# Patient Record
Sex: FEMALE | Race: WHITE | NOT HISPANIC OR LATINO | Employment: OTHER | URBAN - METROPOLITAN AREA
[De-identification: names, ages, dates, MRNs, and addresses within clinical notes are randomized per-mention and may not be internally consistent; named-entity substitution may affect disease eponyms.]

---

## 2017-01-11 DIAGNOSIS — I73.9 PERIPHERAL ARTERY DISEASE (H): Primary | ICD-10-CM

## 2017-01-11 DIAGNOSIS — I10 HTN (HYPERTENSION): Primary | ICD-10-CM

## 2017-01-12 NOTE — TELEPHONE ENCOUNTER
PN,  Routing refill request to provider for review/approval because:  Drug not on the [facility] refill protocol -Pletal not on nursing protocol.  Plavix last ordered 8/2/16 with 0 refills also last creat was 1.40 on 9/6/16 and no ALT/AST (need per nursing protocol) has been done in the last couple of years.  Please advise/authorize if appropriate.  Thanks,  Toshia Beckwith RN

## 2017-01-12 NOTE — TELEPHONE ENCOUNTER
Plavix            Last Written Prescription Date: 08/02/2016  Last Fill Quantity: 30, # refills: 0    Last Office Visit with [facility], [facility] or [facility] prescribing provider:  08/10/2016     Future Office Visit:       WBC      5.1   9/6/2016  RBC     4.49   9/6/2016  HGB     13.0   9/6/2016  HCT     39.6   9/6/2016  No components found with this name: mct  MCV       88   9/6/2016  MCH     29.0   9/6/2016  MCHC     32.8   9/6/2016  RDW     13.8   9/6/2016  PLT      249   9/6/2016  PLT      179   7/25/2005  AST       24   7/22/2014  ALT       21   7/22/2014  CREATININE   Date Value Ref Range Status   09/06/2016 1.40* 0.52 - 1.04 mg/dL Final   ]  Pletal      Last Written Prescription Date:  08/02/2016  Last Fill Quantity: 60,   # refills: 0  Last Office Visit with [facility], [facility] or [facility] prescribing provider: 08/10/2016  Future Office visit:       Routing refill request to provider for review/approval because:  Drug not on the [facility], [facility] or [facility] refill protocol or controlled substance

## 2017-01-13 RX ORDER — CILOSTAZOL 100 MG/1
TABLET ORAL
Qty: 180 TABLET | Refills: 0 | Status: SHIPPED | OUTPATIENT
Start: 2017-01-13 | End: 2017-06-23

## 2017-01-13 RX ORDER — CLOPIDOGREL BISULFATE 75 MG/1
TABLET ORAL
Qty: 90 TABLET | Refills: 0 | Status: SHIPPED | OUTPATIENT
Start: 2017-01-13 | End: 2017-06-23

## 2017-01-18 ENCOUNTER — OFFICE VISIT (OUTPATIENT)
Dept: DERMATOLOGY | Facility: CLINIC | Age: 67
End: 2017-01-18

## 2017-01-18 DIAGNOSIS — Z12.83 SKIN CANCER SCREENING: ICD-10-CM

## 2017-01-18 DIAGNOSIS — L82.1 SEBORRHEIC KERATOSIS: ICD-10-CM

## 2017-01-18 DIAGNOSIS — C44.90 NON-MELANOMA SKIN CANCER: ICD-10-CM

## 2017-01-18 DIAGNOSIS — Z85.828 HISTORY OF BASAL CELL CARCINOMA: ICD-10-CM

## 2017-01-18 DIAGNOSIS — D18.01 CHERRY ANGIOMA: ICD-10-CM

## 2017-01-18 DIAGNOSIS — L85.3 XEROSIS OF SKIN: Primary | ICD-10-CM

## 2017-01-18 RX ORDER — METOPROLOL TARTRATE 50 MG
TABLET ORAL
Qty: 60 TABLET | Refills: 0 | Status: SHIPPED
Start: 2017-01-18 | End: 2017-06-23

## 2017-01-18 RX ORDER — UREA 200 MG/G
CREAM TOPICAL DAILY
Qty: 200 G | Refills: 3 | Status: SHIPPED | OUTPATIENT
Start: 2017-01-18 | End: 2018-03-06

## 2017-01-18 ASSESSMENT — ENCOUNTER SYMPTOMS
POSTURAL DYSPNEA: 0
SPUTUM PRODUCTION: 0
MUSCLE CRAMPS: 0
COUGH: 1
NECK PAIN: 0
TACHYCARDIA: 0
EXERCISE INTOLERANCE: 0
LIGHT-HEADEDNESS: 0
HYPERTENSION: 1
DECREASED LIBIDO: 1
BOWEL INCONTINENCE: 1
JOINT SWELLING: 0
RECTAL BLEEDING: 0
ARTHRALGIAS: 0
MUSCLE WEAKNESS: 0
ORTHOPNEA: 0
COUGH DISTURBING SLEEP: 0
STIFFNESS: 0
DYSPNEA ON EXERTION: 1
PALPITATIONS: 0
BACK PAIN: 1
BLOATING: 1
DIARRHEA: 1
HOT FLASHES: 0
LEG PAIN: 0
WHEEZING: 0
SYNCOPE: 0
RESPIRATORY PAIN: 0
SHORTNESS OF BREATH: 0
HYPOTENSION: 0
SLEEP DISTURBANCES DUE TO BREATHING: 0
SNORES LOUDLY: 0
LEG SWELLING: 1
HEMOPTYSIS: 0
JAUNDICE: 0
MYALGIAS: 0
CLAUDICATION: 0

## 2017-01-18 ASSESSMENT — PAIN SCALES - GENERAL: PAINLEVEL: NO PAIN (0)

## 2017-01-18 NOTE — NURSING NOTE
"Dermatology Rooming Note    Maribel Yang's goals for this visit include:   Chief Complaint   Patient presents with     Skin Check     Maribel states \" I am here to followup on my last skin cancer.\"     Carmen Luz LPN  "
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
## 2017-01-23 ENCOUNTER — MYC MEDICAL ADVICE (OUTPATIENT)
Dept: FAMILY MEDICINE | Facility: CLINIC | Age: 67
End: 2017-01-23

## 2017-01-24 ENCOUNTER — MYC MEDICAL ADVICE (OUTPATIENT)
Dept: FAMILY MEDICINE | Facility: CLINIC | Age: 67
End: 2017-01-24

## 2017-01-25 ENCOUNTER — RADIANT APPOINTMENT (OUTPATIENT)
Dept: GENERAL RADIOLOGY | Facility: CLINIC | Age: 67
End: 2017-01-25
Attending: FAMILY MEDICINE
Payer: MEDICAID

## 2017-01-25 ENCOUNTER — OFFICE VISIT (OUTPATIENT)
Dept: FAMILY MEDICINE | Facility: CLINIC | Age: 67
End: 2017-01-25
Payer: MEDICAID

## 2017-01-25 VITALS
SYSTOLIC BLOOD PRESSURE: 148 MMHG | DIASTOLIC BLOOD PRESSURE: 82 MMHG | BODY MASS INDEX: 19.35 KG/M2 | HEIGHT: 63 IN | TEMPERATURE: 98.1 F | OXYGEN SATURATION: 100 % | WEIGHT: 109.2 LBS | HEART RATE: 79 BPM

## 2017-01-25 DIAGNOSIS — M54.6 RIGHT-SIDED THORACIC BACK PAIN, UNSPECIFIED CHRONICITY: ICD-10-CM

## 2017-01-25 DIAGNOSIS — R07.81 RIB PAIN ON RIGHT SIDE: Primary | ICD-10-CM

## 2017-01-25 DIAGNOSIS — R07.81 RIB PAIN ON RIGHT SIDE: ICD-10-CM

## 2017-01-25 DIAGNOSIS — M67.40 GANGLION CYST: ICD-10-CM

## 2017-01-25 PROCEDURE — 71101 X-RAY EXAM UNILAT RIBS/CHEST: CPT | Mod: RT

## 2017-01-25 PROCEDURE — 99214 OFFICE O/P EST MOD 30 MIN: CPT | Performed by: FAMILY MEDICINE

## 2017-01-25 NOTE — NURSING NOTE
"Chief Complaint   Patient presents with     Derm Problem     lump on R wrist     Back Pain     middle of the back/rib cage pain, did have xray 08/29/2016, pt states still having pain     /82 mmHg  Pulse 79  Temp(Src) 98.1  F (36.7  C) (Oral)  Ht 5' 3\" (1.6 m)  Wt 109 lb 3.2 oz (49.533 kg)  BMI 19.35 kg/m2  SpO2 100% Estimated body mass index is 19.35 kg/(m^2) as calculated from the following:    Height as of this encounter: 5' 3\" (1.6 m).    Weight as of this encounter: 109 lb 3.2 oz (49.533 kg).  BP completed using cuff size: regular       Health Maintenance due pending provider review:  Pap Smear    Aware coming due, completed with Dr. Dexter Mays, [facility]    "

## 2017-01-25 NOTE — PROGRESS NOTES
"  SUBJECTIVE:                                                    Maribel Yang is a 66 year old female who presents to clinic today for the following health issues:      Chief Complaint   Patient presents with     Derm Problem     lump on R wrist     Back Pain     middle of the back/rib cage pain, did have xray 08/29/2016, pt states still having pain     Right wrist lump noticed this past weekend on Saturday - sudden onset.  No pain or injury.  No rash or other.  No limit to range of motion.  No prior history.    Back pain.  Pt states she developed severe pain on the right lower rib area back in August after a day of scrubbing her floors for many hours.  She states the pain was so severe at that time she had to go to the ER.   All ER notes and imaging was reviewed -   The CXR showed questionable rib fracture on the right   The CT of the chest showed possible increase in size of the right upper lobe lesion being followed by oncology.  She states the pain is persisting since that time   She has not worsened.  Hurts to touch the area on her mid thoracic right back.  No skin rashes or lesions.  Does hurt if she leans back on chair backs.  Since her visit she did see oncology and they did PET scan in October that was negative for any bone metastatic lesions.      -------------------------------------    Problem list and histories reviewed & adjusted, as indicated.  Additional history: as documented    Problem list, Medication list, Allergies, and Medical/Social/Surgical histories reviewed in EPIC and updated as appropriate.    ROS:  Constitutional, HEENT, cardiovascular, pulmonary, gi and gu systems are negative, except as otherwise noted.    OBJECTIVE:                                                    /82 mmHg  Pulse 79  Temp(Src) 98.1  F (36.7  C) (Oral)  Ht 5' 3\" (1.6 m)  Wt 109 lb 3.2 oz (49.533 kg)  BMI 19.35 kg/m2  SpO2 100%  Body mass index is 19.35 kg/(m^2).  GENERAL: alert and no distress  RESP: " lungs clear to auscultation - no rales, rhonchi or wheezes  CV: regular rate and rhythm, normal S1 S2, no S3 or S4, no murmur, click or rub, no peripheral edema and peripheral pulses strong  MS: right thoracic back tenderness to palpation of the T8-10 rib area  Wrist - right - small 8-9mm firm cyst appreciated on the lateral flexer wrist.  SKIN: no suspicious lesions or rashes    Diagnostic Test Results:  Xray ribs - no lytic lesion or fractures appreciated     ASSESSMENT/PLAN:                                                      1. Rib pain on right side  Right posterior rib pain -   This is reproducible so I suspect musculoskeletal.  Symptoms present since end of August.  Discussed the possible etiology.  Her PET scan was negative in October for bone metastatic lesions.  Repeat xray shows no new abnormality.    Will have her do physical therapy to see if helpful in relieving her pain  Can try OTC lidocaine patch 4%  - XR Ribs & Chest Right G/E 3 Views; Future  - [facility]    2. Right-sided thoracic back pain, unspecified chronicity  As above  - [facility]    3. Ganglion cyst  Wrist ganglion on right  Reviewed diagnosis  No treatment indicated based on asymptomatic  Pt will call or RTC if symptoms worsen or do not improve.         Lisa Martinez, DO  [facility]

## 2017-01-30 ENCOUNTER — PRE VISIT (OUTPATIENT)
Dept: CARDIOLOGY | Facility: CLINIC | Age: 67
End: 2017-01-30

## 2017-01-30 DIAGNOSIS — E78.5 HYPERLIPIDEMIA LDL GOAL <70: ICD-10-CM

## 2017-01-30 DIAGNOSIS — I73.9 PERIPHERAL ARTERY DISEASE (H): Primary | ICD-10-CM

## 2017-01-31 ENCOUNTER — OFFICE VISIT (OUTPATIENT)
Dept: CARDIOLOGY | Facility: CLINIC | Age: 67
End: 2017-01-31
Attending: INTERNAL MEDICINE
Payer: MEDICAID

## 2017-01-31 VITALS
OXYGEN SATURATION: 99 % | WEIGHT: 111.7 LBS | DIASTOLIC BLOOD PRESSURE: 83 MMHG | SYSTOLIC BLOOD PRESSURE: 156 MMHG | BODY MASS INDEX: 20.56 KG/M2 | HEART RATE: 66 BPM | RESPIRATION RATE: 14 BRPM | HEIGHT: 62 IN

## 2017-01-31 DIAGNOSIS — Z48.298 AFTERCARE FOLLOWING ORGAN TRANSPLANT: ICD-10-CM

## 2017-01-31 DIAGNOSIS — Z94.0 KIDNEY REPLACED BY TRANSPLANT: ICD-10-CM

## 2017-01-31 DIAGNOSIS — E78.5 HYPERLIPIDEMIA LDL GOAL <70: ICD-10-CM

## 2017-01-31 DIAGNOSIS — Z79.899 ENCOUNTER FOR LONG-TERM CURRENT USE OF MEDICATION: ICD-10-CM

## 2017-01-31 DIAGNOSIS — I12.9 RENAL HYPERTENSION, STAGE 1-4 OR UNSPECIFIED CHRONIC KIDNEY DISEASE: Primary | ICD-10-CM

## 2017-01-31 DIAGNOSIS — E78.00 HYPERCHOLESTEREMIA: ICD-10-CM

## 2017-01-31 LAB
ANION GAP SERPL CALCULATED.3IONS-SCNC: 9 MMOL/L (ref 3–14)
BUN SERPL-MCNC: 29 MG/DL (ref 7–30)
CALCIUM SERPL-MCNC: 9 MG/DL (ref 8.5–10.1)
CHLORIDE SERPL-SCNC: 109 MMOL/L (ref 94–109)
CHOLEST SERPL-MCNC: 183 MG/DL
CO2 SERPL-SCNC: 26 MMOL/L (ref 20–32)
CREAT SERPL-MCNC: 1.47 MG/DL (ref 0.52–1.04)
CREAT UR-MCNC: 30 MG/DL
ERYTHROCYTE [DISTWIDTH] IN BLOOD BY AUTOMATED COUNT: 13.3 % (ref 10–15)
GFR SERPL CREATININE-BSD FRML MDRD: 36 ML/MIN/1.7M2
GLUCOSE SERPL-MCNC: 87 MG/DL (ref 70–99)
HCT VFR BLD AUTO: 42.6 % (ref 35–47)
HDLC SERPL-MCNC: 55 MG/DL
HGB BLD-MCNC: 14 G/DL (ref 11.7–15.7)
LDLC SERPL CALC-MCNC: 86 MG/DL
MCH RBC QN AUTO: 28.6 PG (ref 26.5–33)
MCHC RBC AUTO-ENTMCNC: 32.9 G/DL (ref 31.5–36.5)
MCV RBC AUTO: 87 FL (ref 78–100)
NONHDLC SERPL-MCNC: 128 MG/DL
PLATELET # BLD AUTO: 249 10E9/L (ref 150–450)
POTASSIUM SERPL-SCNC: 3.6 MMOL/L (ref 3.4–5.3)
PROT UR-MCNC: 0.5 G/L
PROT/CREAT 24H UR: 1.67 G/G CR (ref 0–0.2)
RBC # BLD AUTO: 4.9 10E12/L (ref 3.8–5.2)
SIROLIMUS BLD-MCNC: 4.6 UG/L (ref 5–15)
SODIUM SERPL-SCNC: 144 MMOL/L (ref 133–144)
TME LAST DOSE: ABNORMAL H
TRIGL SERPL-MCNC: 206 MG/DL
WBC # BLD AUTO: 5.3 10E9/L (ref 4–11)

## 2017-01-31 PROCEDURE — 84156 ASSAY OF PROTEIN URINE: CPT | Performed by: INTERNAL MEDICINE

## 2017-01-31 PROCEDURE — 80061 LIPID PANEL: CPT | Performed by: INTERNAL MEDICINE

## 2017-01-31 PROCEDURE — 80048 BASIC METABOLIC PNL TOTAL CA: CPT | Performed by: INTERNAL MEDICINE

## 2017-01-31 PROCEDURE — 85027 COMPLETE CBC AUTOMATED: CPT | Performed by: INTERNAL MEDICINE

## 2017-01-31 PROCEDURE — 80195 ASSAY OF SIROLIMUS: CPT | Performed by: INTERNAL MEDICINE

## 2017-01-31 PROCEDURE — 36415 COLL VENOUS BLD VENIPUNCTURE: CPT | Performed by: INTERNAL MEDICINE

## 2017-01-31 PROCEDURE — 99214 OFFICE O/P EST MOD 30 MIN: CPT | Performed by: INTERNAL MEDICINE

## 2017-01-31 PROCEDURE — 99213 OFFICE O/P EST LOW 20 MIN: CPT

## 2017-01-31 RX ORDER — LOSARTAN POTASSIUM 25 MG/1
12.5 TABLET ORAL DAILY
Qty: 45 TABLET | Refills: 1 | Status: SHIPPED | OUTPATIENT
Start: 2017-01-31 | End: 2017-08-14

## 2017-01-31 RX ORDER — ATORVASTATIN CALCIUM 20 MG/1
20 TABLET, FILM COATED ORAL DAILY
Qty: 30 TABLET | Refills: 3 | Status: SHIPPED | OUTPATIENT
Start: 2017-01-31 | End: 2017-07-12

## 2017-01-31 NOTE — PATIENT INSTRUCTIONS
PATIENT INSTRUCTIONS:  1. Follow up with Dr. Mckinley in 2018  2. Maintain a cardiac healthy diet  3. Medication change:   Start taking Cozaar (Losartan) 12.5 mg by mouth everyday   Stop taking the Pletal for one week and MyChart message Dr. Mckinley as to whether or not the diarrhea gets better   You will be discontinuing the Zocor (Simvastatin) and you will be starting Lipitor (Atorvastatin) 20 mg by mouth in a couple of weeks  4. Most recent lipid panel below  Recent Labs   Lab Test  01/31/17   0748  06/13/14   0916  10/02/09   0840   CHOL  183  159  185   HDL  55  65  47*   LDL  86  70  98   TRIG  206*  123  200*   CHOLHDLRATIO   --   2.4  3.9   Results for ERASMO MERINO (MRN [id number]) as of 1/31/2017 10:22   Ref. Range 1/31/2017 07:48   Sodium Latest Ref Range: 133-144 mmol/L 144   Potassium Latest Ref Range: 3.4-5.3 mmol/L 3.6   Chloride Latest Ref Range:  mmol/L 109   Carbon Dioxide Latest Ref Range: 20-32 mmol/L 26   Urea Nitrogen Latest Ref Range: 7-30 mg/dL 29   Creatinine Latest Ref Range: 0.52-1.04 mg/dL 1.47 (H)   GFR Estimate Latest Ref Range: >60 mL/min/1.7m2 36 (L)   GFR Estimate If Black Latest Ref Range: >60 mL/min/1.7m2 43 (L)   Calcium Latest Ref Range: 8.5-10.1 mg/dL 9.0   Anion Gap Latest Ref Range: 3-14 mmol/L 9   Cholesterol Latest Ref Range: <200 mg/dL 183   HDL Cholesterol Latest Ref Range: >49 mg/dL 55   LDL Cholesterol Calculated Latest Ref Range: <100 mg/dL 86   Non HDL Cholesterol Latest Ref Range: <130 mg/dL 128   Triglycerides Latest Ref Range: <150 mg/dL 206 (H)   Glucose Latest Ref Range: 70-99 mg/dL 87   WBC Latest Ref Range: 4.0-11.0 10e9/L 5.3   Hemoglobin Latest Ref Range: 11.7-15.7 g/dL 14.0   Hematocrit Latest Ref Range: 35.0-47.0 % 42.6   Platelet Count Latest Ref Range: 150-450 10e9/L 249   RBC Count Latest Ref Range: 3.8-5.2 10e12/L 4.90   MCV Latest Ref Range:  fl 87   MCH Latest Ref Range: 26.5-33.0 pg 28.6   MCHC Latest Ref Range: 31.5-36.5 g/dL 32.9   RDW  Latest Ref Range: 10.0-15.0 % 13.3         Padma Flores RN  Nurse Coordinator  Phone number [phone]  Please use [employer] to communicate with your HealthCare Provider      If you have an urgent need after hours (8:00 am to 4:30 pm) please call [phone] and ask for the cardiology fellow on call.
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
## 2017-01-31 NOTE — MR AVS SNAPSHOT
After Visit Summary   1/31/2017    Maribel Yang    MRN: [id number]           Patient Information     Date Of Birth          1950        Visit Information        Provider Department      1/31/2017 8:00 AM Alejandro Mckinley MD [facility]        Today's Diagnoses     Renal hypertension, stage 1-4 or unspecified chronic kidney disease    -  1     Hypercholesteremia           Care Instructions    PATIENT INSTRUCTIONS:  1. Follow up with Dr. Mckinley in 2018  2. Maintain a cardiac healthy diet  3. Medication change:   Start taking Cozaar (Losartan) 12.5 mg by mouth everyday   Stop taking the Pletal for one week and MyChart message Dr. Mckinley as to whether or not the diarrhea gets better   You will be discontinuing the Zocor (Simvastatin) and you will be starting Lipitor (Atorvastatin) 20 mg by mouth in a couple of weeks  4. Most recent lipid panel below  Recent Labs   Lab Test  01/31/17   0748  06/13/14   0916  10/02/09   0840   CHOL  183  159  185   HDL  55  65  47*   LDL  86  70  98   TRIG  206*  123  200*   CHOLHDLRATIO   --   2.4  3.9   Results for MARIBEL YANG (MRN [id number]) as of 1/31/2017 10:22   Ref. Range 1/31/2017 07:48   Sodium Latest Ref Range: 133-144 mmol/L 144   Potassium Latest Ref Range: 3.4-5.3 mmol/L 3.6   Chloride Latest Ref Range:  mmol/L 109   Carbon Dioxide Latest Ref Range: 20-32 mmol/L 26   Urea Nitrogen Latest Ref Range: 7-30 mg/dL 29   Creatinine Latest Ref Range: 0.52-1.04 mg/dL 1.47 (H)   GFR Estimate Latest Ref Range: >60 mL/min/1.7m2 36 (L)   GFR Estimate If Black Latest Ref Range: >60 mL/min/1.7m2 43 (L)   Calcium Latest Ref Range: 8.5-10.1 mg/dL 9.0   Anion Gap Latest Ref Range: 3-14 mmol/L 9   Cholesterol Latest Ref Range: <200 mg/dL 183   HDL Cholesterol Latest Ref Range: >49 mg/dL 55   LDL Cholesterol Calculated Latest Ref Range: <100 mg/dL 86   Non HDL Cholesterol Latest Ref Range: <130 mg/dL 128   Triglycerides Latest Ref Range: <150 mg/dL  206 (H)   Glucose Latest Ref Range: 70-99 mg/dL 87   WBC Latest Ref Range: 4.0-11.0 10e9/L 5.3   Hemoglobin Latest Ref Range: 11.7-15.7 g/dL 14.0   Hematocrit Latest Ref Range: 35.0-47.0 % 42.6   Platelet Count Latest Ref Range: 150-450 10e9/L 249   RBC Count Latest Ref Range: 3.8-5.2 10e12/L 4.90   MCV Latest Ref Range:  fl 87   MCH Latest Ref Range: 26.5-33.0 pg 28.6   MCHC Latest Ref Range: 31.5-36.5 g/dL 32.9   RDW Latest Ref Range: 10.0-15.0 % 13.3         Padma Flores RN  Nurse Coordinator  Phone number [phone]  Please use [employer] to communicate with your HealthCare Provider      If you have an urgent need after hours (8:00 am to 4:30 pm) please call [phone] and ask for the cardiology fellow on call.              Follow-ups after your visit        Follow-up notes from your care team     Return in about 1 year (around 1/31/2018), or if symptoms worsen or fail to improve, for Dr. Mckinley for CAD and PAD.      Your next 10 appointments already scheduled     Apr 07, 2017  1:00 PM   CT CHEST W/O CONTRAST with UUCT1   [facility], [place], [place] ([facility], [facility])    [address] [address] [phone]           Please bring any scans or X-rays taken at other hospitals, if similar tests were done. Also bring a list of your medicines, including vitamins, minerals and over-the-counter drugs. It is safest to leave personal items at home.  Be sure to tell your doctor:   If you have any allergies.   If there s any chance you are pregnant.   If you are breastfeeding.   If you have any special needs.  You do not need to do anything special to prepare.  Please wear loose clothing, such as a sweat suit or jogging clothes. Avoid snaps, zippers and other metal. We may ask you to undress and put on a hospital gown.            Apr 11, 2017  1:00 PM   Return Visit with Nasim Mckeon MD   [facility] ([facility])    [facility]  "[facility]  1st Floor  [address] [address]   [phone]              Future tests that were ordered for you today     Open Future Orders        Priority Expected Expires Ordered    EKG 12-lead, tracing only (Future) Routine 1/31/2017 5/30/2017 1/30/2017            Who to contact     If you have questions or need follow up information about today's clinic visit or your schedule please contact [facility] directly at [phone].  Normal or non-critical lab and imaging results will be communicated to you by [employer]per[employer], letter or phone within 4 business days after the clinic has received the results. If you do not hear from us within 7 days, please contact the clinic through [employer] or phone. If you have a critical or abnormal lab result, we will notify you by phone as soon as possible.  Submit refill requests through [employer] or call your pharmacy and they will forward the refill request to us. Please allow 3 business days for your refill to be completed.          Additional Information About Your Visit        [employer] Information     [employer] gives you secure access to your electronic health record. If you see a primary care provider, you can also send messages to your care team and make appointments. If you have questions, please call your primary care clinic.  If you do not have a primary care provider, please call [phone] and they will assist you.        Care EveryWhere ID     This is your Care EveryWhere ID. This could be used by other organizations to access your [place] medical records  [id number]        Your Vitals Were     Pulse Respirations Height BMI (Body Mass Index) Pulse Oximetry       66 14 1.575 m (5' 2\") 20.43 kg/m2 99%        Blood Pressure from Last 3 Encounters:   01/31/17 156/83   01/25/17 148/82   10/11/16 154/73    Weight from Last 3 Encounters:   01/31/17 50.667 kg (111 lb 11.2 oz)   01/25/17 49.533 kg (109 lb 3.2 oz)   10/11/16 49.442 kg " (109 lb)              We Performed the Following     Lipid panel reflex to direct LDL          Today's Medication Changes          These changes are accurate as of: 1/31/17 10:28 AM.  If you have any questions, ask your nurse or doctor.               Start taking these medicines.        Dose/Directions    atorvastatin 20 MG tablet   Commonly known as:  LIPITOR   Used for:  Hypercholesteremia   Started by:  Alejandro Mckinley MD        Dose:  20 mg   Take 1 tablet (20 mg) by mouth daily   Quantity:  30 tablet   Refills:  3       losartan 25 MG tablet   Commonly known as:  COZAAR   Used for:  Renal hypertension, stage 1-4 or unspecified chronic kidney disease   Started by:  Alejandro Mckinley MD        Dose:  12.5 mg   Take 0.5 tablets (12.5 mg) by mouth daily   Quantity:  45 tablet   Refills:  1         Stop taking these medicines if you haven't already. Please contact your care team if you have questions.     simvastatin 20 MG tablet   Commonly known as:  ZOCOR   Stopped by:  Alejandro Mkcinley MD                Where to get your medicines      These medications were sent to [facility] - [address]  [address] [place] [address]    Hours:  Mon-Fri 8:30am-5:00pm Toll Free [phone] Phone:  [phone]    - atorvastatin 20 MG tablet  - losartan 25 MG tablet             Primary Care Provider Office Phone # Fax #    Lisa Martinez  [phone]       [facility] [address] [address]        Thank you!     Thank you for choosing [facility]  for your care. Our goal is always to provide you with excellent care. Hearing back from our patients is one way we can continue to improve our services. Please take a few minutes to complete the written survey that you may receive in the mail after your visit with us. Thank you!             Your Updated Medication List - Protect others around you:  Learn how to safely use, store and throw away your medicines at www.disposemymeds.org.          This list is accurate as of: 1/31/17 10:28 AM.  Always use your most recent med list.                   Brand Name Dispense Instructions for use    acetaminophen-codeine 300-30 MG per tablet    TYLENOL/codeine #3    20 tablet    Take 1-2 tablets by mouth every 6 hours as needed for pain       amoxicillin 500 MG capsule    AMOXIL    16 capsule    Take 4 capsules (2,000 mg) by mouth once as needed Prior to dental procedures       atorvastatin 20 MG tablet    LIPITOR    30 tablet    Take 1 tablet (20 mg) by mouth daily       calcium carbonate 500 MG tablet    OS-KAYKAY 500 mg [place]. Ca     1 tab bid       cilostazol 100 MG tablet    PLETAL    180 tablet    TAKE ONE TABLET BY MOUTH TWICE A DAY       clopidogrel 75 MG tablet    PLAVIX    90 tablet    TAKE ONE TABLET BY MOUTH EVERY DAY       losartan 25 MG tablet    COZAAR    45 tablet    Take 0.5 tablets (12.5 mg) by mouth daily       metoprolol 50 MG tablet    LOPRESSOR    60 tablet    TAKE TWO TABLETS BY MOUTH TWICE A DAY       mycophenolic acid 180 MG EC tablet    MYFORTIC - GENERIC EQUIVALENT    180 tablet    Take 1 tablet (180 mg) by mouth 2 times daily       NIFEdipine ER osmotic 90 MG 24 hr tablet    PROCARDIA XL    90 tablet    Take 1 tablet (90 mg) by mouth daily       order for DME     1 Device    Equipment being ordered: TENS       predniSONE 5 MG tablet    DELTASONE    90 tablet    Take 1 tablet (5 mg) by mouth daily       sirolimus 1 MG tablet    RAPAMUNE - GENERIC EQUIVALENT    180 tablet    Take 2 tablets (2 mg) by mouth daily       Urea 20 % Crea     200 g    Externally apply topically daily

## 2017-03-15 DIAGNOSIS — I10 HTN (HYPERTENSION): ICD-10-CM

## 2017-03-21 RX ORDER — METOPROLOL TARTRATE 50 MG
TABLET ORAL
Qty: 360 TABLET | Refills: 1 | Status: SHIPPED | OUTPATIENT
Start: 2017-03-21 | End: 2017-09-14

## 2017-04-11 ENCOUNTER — HOSPITAL ENCOUNTER (OUTPATIENT)
Dept: CT IMAGING | Facility: CLINIC | Age: 67
Discharge: HOME | End: 2017-04-11
Attending: RADIOLOGY | Admitting: RADIOLOGY
Payer: COMMERCIAL

## 2017-04-11 DIAGNOSIS — C34.90 SQUAMOUS CELL LUNG CANCER, UNSPECIFIED LATERALITY (H): ICD-10-CM

## 2017-04-11 PROCEDURE — 71250 CT THORAX DX C-: CPT

## 2017-05-10 DIAGNOSIS — I73.9 PERIPHERAL ARTERY DISEASE (H): ICD-10-CM

## 2017-05-10 RX ORDER — CILOSTAZOL 100 MG/1
TABLET ORAL
Qty: 180 TABLET | Refills: 0 | Status: SHIPPED | OUTPATIENT
Start: 2017-05-10 | End: 2017-08-14

## 2017-05-10 RX ORDER — CLOPIDOGREL BISULFATE 75 MG/1
TABLET ORAL
Qty: 90 TABLET | Refills: 0 | Status: SHIPPED | OUTPATIENT
Start: 2017-05-10 | End: 2017-08-14

## 2017-05-10 NOTE — TELEPHONE ENCOUNTER
Routing refill request to provider for review/approval because:  Pletal: Drug not on the [facility] [place]   Plavix: Labs out of range:  Creatinine, Labs not current:  Needs yearly AST/ALT  Pended 1 month supply; due for physical  Dannielle DSOUZA RN    Pending Prescriptions:                       Disp   Refills    clopidogrel (PLAVIX) 75 MG tablet [Pharmac*90 tab*0        Sig: TAKE ONE TABLET BY MOUTH EVERY DAY    cilostazol (PLETAL) 100 MG tablet [Pharmac*180 ta*0        Sig: TAKE ONE TABLET BY MOUTH TWICE A DAY    Plavix        Last Written Prescription Date: 1/13/2017  Last Fill Quantity: 90,    # refills: 0  Last Office Visit with [facility], [facility] or [employer] prescribing provider:  1/25/2017      Lab Results   Component Value Date    WBC 5.3 01/31/2017     Lab Results   Component Value Date    RBC 4.90 01/31/2017     Lab Results   Component Value Date    HGB 14.0 01/31/2017     Lab Results   Component Value Date    HCT 42.6 01/31/2017     No components found for: MCT  Lab Results   Component Value Date    MCV 87 01/31/2017     Lab Results   Component Value Date    MCH 28.6 01/31/2017     Lab Results   Component Value Date    MCHC 32.9 01/31/2017     Lab Results   Component Value Date    RDW 13.3 01/31/2017     Lab Results   Component Value Date     01/31/2017     Lab Results   Component Value Date    AST 24 07/22/2014     Lab Results   Component Value Date    ALT 21 07/22/2014     Creatinine   Date Value Ref Range Status   01/31/2017 1.47 (H) 0.52 - 1.04 mg/dL Final     Pletal      Last Written Prescription Date:  1/13/2017  Last Fill Quantity: 180,   # refills: 0  Last Office Visit with [facility], [email or web address] or [employer] prescribing provider: 1/25/2017  Future Office visit:       Routing refill request to provider for review/approval because:  Drug not on the [facility], [facility] or [employer] refill protocol or controlled substance

## 2017-05-10 NOTE — TELEPHONE ENCOUNTER
PN,  Pt had stopped each med for a week at a time after seeing Dr. Mckinley 1/31/17.  She has been taking both again since ~mid feb.  Diarrhea continues, has been ongoing for years, has been using imoduium AD unless you have any other options.  If so we can call her back and leave a detailed message.  Please advise.  Thanks,  Toshia Beckwith RN

## 2017-05-10 NOTE — TELEPHONE ENCOUNTER
Per cardiology for the pletal and plavix:    1. PAD:   - Stable, possibly slightly improved.  - I believe either the Pletal or Plavix is causing the diarrhea so we will temporarily stop the Pletal for a week and see if symptoms improve.  - If diarrhea persists, resume Pletal, and stop Plavix, substituting ASA 81 mg qd, to ensure antiplatelet therapy is on board.  - Continue statin       Please find out if the patient did this?  What happened?    PN

## 2017-05-25 NOTE — PROGRESS NOTES
"Follow Up Notes on Referred Patient    Date: 17       Dr. Lisa Martinez, DO  [facility]  [address] [facility]  [address]  [place] [address]       RE: Maribel Yang  : 1950  JESSEE: 17    Dear Dr. Lisa Martinez:    Maribel Yang is a 66 year old woman with a diagnosis of  immunosuppression related to kidney transplant and recurrent vaginal, vulvar, cervical, and anal dysplasia. She is here today for repeat pap and colposcopy.    Brief Cancer History:   : + HPV 6 Low risk   10/07: ASCUS, + HPV 56, 54 & 6. : [place]: TAL II   3/11/08: LEEP - TAL II   : ASCUS, ECC atypia, +HPV 82   : [place] - Atypia   : NIL pap, : NIL pap, neg HPV   : LSIL, + HPV 33 or 45, 61.   5/15/13: [place] - VAIN II & III,TAL II. Referred to gyn onc.   13: [place] with gyn onc. Plan LEEP and CO2 laser to vagina, cx and vaginal vault.   13: Colpo in OR, Co2 laser vagina and vulva , YUNIOR 1- 2, AIN 2-3, VAIN 2-3, LEEP, ECC negative.  13: Had colonoscopy with Dr. Musa which was negative. Post coital bleeding noted; uses lubrication..   Pap ASC-H HPV High Risk types HPV 33/45 DNA.  FINAL DIAGNOSIS:  A. Vagina, posterior distal, biopsy: Benign, nondysplastic squamous epithelium with atrophy.  B. Vulva, left clitoral, biopsy: High grade and low grade squamous intraepithelial lesion (vulvar intraepithelial neoplasia 2 and 1) (see comment).  C. Anus, \"external perianal\", biopsy: High grade and low grade squamous intraepithelial lesion (perianal intraepithelial neoplasia 3 and 1) (see comment).  COMMENT: An HSV immunostain is in process for the left clitoral biopsy material, given the presence of multinucleated cells. The tissue findings correlate with the concurrent anal Pap test, interpreted as high grade squamous intraepithelial lesion (see report [id number]).   Anal Pap: Epithelial Cell Abnormality: Squamous Cell: High-grade squamous intraepithelial lesion (HSIL) encompassing: " moderate and severe dysplasia, carcinoma In Situ/ CIN2 and TAL 3.  Referred to Dr Renteria for treatment.  12/23/13: Per Dr. Renteria: Recommended.Colposcopy, CO2 laser vagina and vulva , possible wide local excision for vulvar and anal dysplasia. Will plan for concurrent procedure with colorectal-Dr. Ace. She has not been seen by colorectal since her last surgery.   2/4/14: PETCT IMPRESSION:   1. There is a hypermetabolic right lung 5.5 SUV cavitary RUL 1.2 x 1.2   cm pulmonary nodule, proven malignancy per history and biopsy.   2. There is a 4 mm RUL nodule series 10 image 17, too small to characterize.   3. In the pelvis and abdomen there is no abnormal uptake.   4. There is right calf edema. Clinical correlation suggested.   5. Left pelvic kidney transplant.  Lung biopsy: Right upper lobe lung carcinoma. Pt deferred gyn treatment until after completing treatment for lung cancer.  3/14: Dx [place] RUL SBRT: RUL 5400 cGy completed 3/24/14   5/8/14: Pt has completed radiation for new diagnosis of lung cancer. Quit smoking! Did not f/u with surgery with Dr. Renteria.or Dr Ace-M due to radiation. Here today for surveillance. She notes no vaginal or rectal bleeding, no vulvar irritation, not sexually active due to dyspareunia. Had previously used estrogen cream but was advised not due to her transplant and risk for blood clots. She is ready for treatment for her known high grade anogenitial dysplasia. Colposcopic impression notes vaginal, vulvar and anal high grade dysplasia without obvious invasion though I am concerned of right labia lesion. ASC-US pap, HPV+  7/15/14: Pap: LSIL. HPV screen: Final Diagnosis:  This patient's sample is positive for high risk HPV DNA.  Diagnosis Comment: This patient's sample is positive for HPV 33 or 45 DNA. The observed banding pattern does not allow us to conclusively differentiate between these two HPV types. The presence of HPV 33 or 45 DNA in lesions of the anogenital tract is  considered a high risk factor for the development of malignancy. Close clinical follow-up is recommended.  Guidelines referenced to assign HPV carcinogenicity are from Keshia V, et al. A review of human carcinogens-Part B:biological agents. Lancet Oncol; April 2009;10:321. High risk types: 16, 18, 31, 33, 35, 39, 45, 51, 52, 56, 58, 59, 68   Pathology Biopsies: SPECIMEN(S):  A: Left clitoral biopsy  B: Right anterior perianal skin  FINAL DIAGNOSIS:  A. Vulva, left clitoris, biopsy:  -High grade squamous intraepithelial lesion  -No evidence of invasive malignancy  B: Vulva, right anterior perianal skin, biopsy:  -High grade squamous intraepithelial lesion  -No evidence of invasive malignancy  8/7/2014: Post-op appointment following colposcopy, excisional biopsies, CO2 laser ablation and pap smear.   1) Vulvar colposcopy showed acetowhite changes at the clitoral العراقي that extended down the right labia majora and at the left posterior fourchette.   2) Vaginal colposcopy showed no concerning abnormalities.  3) Left clitoral and right anterior perianal skin (obtained by Dr. Db Carbajal) biopsies showed high grade squamous intraepithelial lesions without invasive malignancy.  4) CO2 laser ablation of tissues with acetowhite changes including the clitoral العراقي, right labia majora, and posterior fourchette.   5) Pap smear: LSIL   6) Positive for high risk HPV DNA  9/23/14: CT Chest: IMPRESSION:  1. Stable spiculated nodule in the right upper lobe compatible with radiotracer squamous cell carcinoma.  2. Moderate to severe centrilobular emphysema.  12/22/14: CT Chest: IMPRESSION:  1. Stable right upper lobe dominant spiculated nodule consistent with squamous cell carcinoma. Additional stable nodules as above.  2. Small pericardial effusion.  3. Stable left adrenal adenoma.  3/23/15: CT Chest: Impression:  1. Stable postradiation therapy changes to the right upper lobe nodule.  2. No evidence of recurrent disease.  3.  End-stage renal disease.  4. Three-vessel coronary artery calcification.  5. Dilatation of the descending thoracic aorta, and an aberrant right subclavian artery.    4/02/15: Vaginal biopsies show VAIN 1 and 2. Pap: ASC-US  FINAL DIAGNOSIS:  Vagina, left suburethral, colposcopic biopsy:  -Low grade and high grade squamous intraepithelial lesion (vaginal  intraepithelial neoplasia 1 and 2)     Patient Name: ERASMO MERINO   MR#: [id number]   Specimen #: [id number]   Collected: 5/26/2016   Received: 5/26/2016   Reported: 5/27/2016 22:05   Ordering Phy(s): GIORGI KWONG     SPECIMEN(S):   Vaginal biopsy, right wall     FINAL DIAGNOSIS:   Vagina, right wall, biopsy:   -Low grade squamous intraepithelial lesion (vaginal intraepithelial   neoplasia 1)   Anal Pap:   Negative for Intraepithelial Lesion or Malignancy   Specimen Adequacy: Satisfactory for evaluation.   -Transformation zone component absent.     Patient Name: ERASMO MERINO   MR#: [id number]   Specimen #: [id number]   Collected: 5/26/2016   Received: 5/27/2016   Reported: 6/6/2016 09:57   Ordering Phy(s): GIORGI KWONG     SPECIMEN/STAIN PROCESS:   Pap imaged thin layer prep screening (Surepath, FocalPoint with guided   screening)        Pap-Cyto x 1, Reflex HPV if NIL/ASCUS/LSIL x 1     SOURCE: Cervical, endocervical   ----------------------------------------------------------------    Pap imaged thin layer prep screening (Surepath, FocalPoint with guided   screening)   SPECIMEN ADEQUACY:   Satisfactory for evaluation.   -Transformation zone component present.     CYTOLOGIC INTERPRETATION:     Epithelial Cell Abnormality:  Glandular Cell:  Atypical-endocervical     6/28/16:   A: Endometrial biopsy   B: Endocervical curettings     FINAL DIAGNOSIS:   A: Endometrium, biopsy:   -Rare, minute, superficial strips of benign, atrophic endometrial   epithelium   -High grade squamous intraepithelial lesion (cervical intraepithelial   neoplasia 3)    -Cervical transformation zone mucosa present   -Rare detached fragments of unremarkable endocervical tissue   -See comment     B: Endocervix, curettage:   -High grade squamous intraepithelial lesion (cervical intraepithelial   neoplasia 3)   -Cervical transformation zone mucosa not identified   -See comment       8/17/2016  Exam under anesthesia, colposcopy of the vagina, LEEP conization of the cervix, vaginal biopsies.    A: LEEP biopsy   B: Endocervical curettings   C: Vaginal biopsy, apex at 11 o'clock   D: Vaginal biopsy, apex at 4 o'clock   E: Vaginal biopsy, posterior     FINAL DIAGNOSIS:   A: UTERINE CERVIX, LOOP ELECTROSURGICAL EXCISION PROCEDURE (LEEP):   - Low grade squamous intraepithelial lesion (cervical intraepithelial   neoplasia 1)   - Partial denudation of superficial epithelium   - Margins negative for dysplasia     B: ENDOCERVIX, CURETTAGE:   - Cervical epithelium with reactive change     C: VAGINA, APEX AT 11:00 POSITION, BIOPSY:   - Connective tissue with patchy chronic inflammation and scattered foci   of reactive epithelium suggestive of denuded squamous mucosa     D: VAGINA, APEX AT 4:00 POSITION, BIOPSY:   - Squamous mucosa with reactive changes     E: VAGINA, POSTERIOR, BIOPSY:   - Low grade squamous intraepithelial lesion (vaginal intraepithelial   neoplasia 1)       Today: She feels well. No vaginal or rectal bleeding. Endorses feeling like she is having discharge but there is not any liquid, discharge, or malodor on her panty liners. Does not think she is having urinary incontinence.     Answers for HPI/ROS submitted by the patient on 6/1/2017   General Symptoms: No  Skin Symptoms: No  HENT Symptoms: No  EYE SYMPTOMS: No  HEART SYMPTOMS: No  LUNG SYMPTOMS: No  INTESTINAL SYMPTOMS: Yes  URINARY SYMPTOMS: No  GYNECOLOGIC SYMPTOMS: Yes  BREAST SYMPTOMS: No  SKELETAL SYMPTOMS: No  BLOOD SYMPTOMS: Yes  NERVOUS SYSTEM SYMPTOMS: No  MENTAL HEALTH SYMPTOMS: No  Heart burn or indigestion:  Yes  Nausea: No  Vomiting: No  Abdominal pain: No  Bloating: Yes  Constipation: No  Diarrhea: Yes  Blood in stool: No  Black stools: No  Rectal or Anal pain: No  Fecal incontinence: Yes  Rectal bleeding: No  Yellowing of skin or eyes: No  Vomit with blood: No  Change in stools: Yes  Hemorrhoids: No  Anemia: No  Swollen glands: No  Easy bleeding or bruising: Yes  Edema or swelling: Yes  Bleeding or spotting between periods: No  Heavy or painful periods: No  Irregular periods: No  Vaginal discharge: Yes  Hot flashes: No  Vaginal dryness: Yes  Genital ulcers: No  Reduced libido: Yes  Painful intercourse: Yes  Difficulty with sexual arousal: Yes  Post-menopausal bleeding: No    I have reviewed and addressed the patient's review of symptoms for today's visit.     Past Medical History:    Past Medical History:   Diagnosis Date     Abnormal coagulation profile     p [id number] heterozygote      Abnormal Papanicolaou smear of cervix and cervical HPV     Many years ago -- had colposcopies -- normal for years     Acute venous embolism and thrombosis of other specified veins      Anemia      Antiplatelet or antithrombotic long-term use      ASCUS with positive high risk HPV 2007, 2015    + HPV 56, 54,& 6, colp - TAL II, [place] =TAL II     Difficulty in walking(719.7)      High risk medication use      Hypertension      Immunosuppressed status (H)      Kidney replaced by transplant 9/04    Living donor recipient,  Rejection 7/2005     LSIL (low grade squamous intraepithelial lesion) on Pap smear 4/2013    +HPV 33 or 45, 61       Migraine, unspecified, without mention of intractable migraine without mention of status migrainosus      NONSPECIFIC MEDICAL HISTORY     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     Other specified viral warts 2007    Rectal warts -- HPV type 6 -- low risk     PONV (postoperative nausea and vomiting)      Renal disease      Squamous cell lung cancer (H)       Thrombosis of leg      Unspecified disorder of kidney and ureter     X-linked dominant Alport's syndrome.         Past Surgical History:    Past Surgical History:   Procedure Laterality Date     C NONSPECIFIC PROCEDURE      Thrombectomy     C NONSPECIFIC PROCEDURE  1955 and 1959    Bilater eye surgery - correction for crossed eyes     C NONSPECIFIC PROCEDURE  1998    oopherectomy L     C NONSPECIFIC PROCEDURE  1967    open kidney biopsy - L     C TRANSPLANTATION OF KIDNEY  9/04    recipient -- done [facility]     COLONOSCOPY       COLPOSCOPY,LOOP ELECTRD CERVIX EXCIS  03/11/08    TAL II     CONIZATION LEEP  7/17/2013    Procedure: CONIZATION LEEP;;  Surgeon: Liliana Renteria MD;  Location[facility] OR     CONIZATION LEEP N/A 8/17/2016    Procedure: CONIZATION LEEP;  Surgeon: Liliana Renteria MD;  Location: [facility] OR     EXAM UNDER ANESTHESIA ANUS  7/15/2014    Procedure: EXAM UNDER ANESTHESIA ANUS;  Surgeon: Radha Musa MD;  Location: [facility] OR     EYE SURGERY       LASER CO2 EXCISE VULVA WIDE LOCAL  7/15/2014    Procedure: LASER CO2 EXCISE VULVA WIDE LOCAL;  Surgeon: Liliana Renteria MD;  Location[facility] OR     LASER CO2 VAGINA  7/17/2013    Procedure: LASER CO2 VAGINA;;  Surgeon: Liliana Renteria MD;  Location[facility]     MICROSCOPY ANAL  7/17/2013    Procedure: MICROSCOPY ANAL;  Anal Microscopy,  EUA vagina,Colposcopy Of Vagina And Vulva, Vaginal Biopsies, Omniguide Co2 Laser To Vagina and vulva, Loop Electrosurgical Excision Procedure To Cervix;  Surgeon: Radha Musa MD;  Location[facility]     MICROSCOPY ANAL  7/15/2014    Procedure: MICROSCOPY ANAL;  Surgeon: Radha Musa MD;  Location: [facility]         Health Maintenance Due   Topic Date Due     HEPATITIS C SCREENING  12/15/1970     PAP Q6 MOS DIAGNOSTIC  02/17/2017     COLPOSCOPY Q6 MOS  02/17/2017     MAMMO Q1 YR  06/01/2017       Current Medications:     Current Outpatient Prescriptions   Medication Sig Dispense Refill      clopidogrel (PLAVIX) 75 MG tablet TAKE ONE TABLET BY MOUTH EVERY DAY 90 tablet 0     cilostazol (PLETAL) 100 MG tablet TAKE ONE TABLET BY MOUTH TWICE A  tablet 0     metoprolol (LOPRESSOR) 50 MG tablet TAKE TWO TABLETS (100MG) BY MOUTH TWICE A  tablet 1     losartan (COZAAR) 25 MG tablet Take 0.5 tablets (12.5 mg) by mouth daily 45 tablet 1     atorvastatin (LIPITOR) 20 MG tablet Take 1 tablet (20 mg) by mouth daily 30 tablet 3     metoprolol (LOPRESSOR) 50 MG tablet TAKE TWO TABLETS BY MOUTH TWICE A DAY 60 tablet 0     Urea 20 % CREA Externally apply topically daily 200 g 3     cilostazol (PLETAL) 100 MG tablet TAKE ONE TABLET BY MOUTH TWICE A  tablet 0     clopidogrel (PLAVIX) 75 MG tablet TAKE ONE TABLET BY MOUTH EVERY DAY 90 tablet 0     sirolimus (RAPAMUNE - GENERIC EQUIVALENT) 1 MG tablet Take 2 tablets (2 mg) by mouth daily 180 tablet 3     mycophenolic acid (MYFORTIC - GENERIC EQUIVALENT) 180 MG EC tablet Take 1 tablet (180 mg) by mouth 2 times daily 180 tablet 3     NIFEdipine ER osmotic (PROCARDIA XL) 90 MG 24 hr tablet Take 1 tablet (90 mg) by mouth daily 90 tablet 3     amoxicillin (AMOXIL) 500 MG capsule Take 4 capsules (2,000 mg) by mouth once as needed Prior to dental procedures 16 capsule 3     predniSONE (DELTASONE) 5 MG tablet Take 1 tablet (5 mg) by mouth daily 90 tablet 3     acetaminophen-codeine (TYLENOL/CODEINE #3) 300-30 MG per tablet Take 1-2 tablets by mouth every 6 hours as needed for pain 20 tablet 0     ORDER FOR DME Equipment being ordered: TENS 1 Device 0     CALCIUM 500 MG OR TABS 1 tab bid  0         Allergies:        Allergies   Allergen Reactions     Fentanyl Nausea     Hydrocodone Nausea and Vomiting and Hives     Ultracet Nausea and Vomiting and Hives        Social History:     Social History   Substance Use Topics     Smoking status: Former Smoker     Packs/day: 0.30     Years: 35.00     Types: Cigarettes     Quit date: 1/9/2014     Smokeless tobacco:  "Never Used      Comment: on and off     Alcohol use 0.0 oz/week     0 Standard drinks or equivalent per week      Comment: rare       History   Drug Use No         Family History:       Family History   Problem Relation Age of Onset     DIABETES Father      type 2 diag age,60's     Alcohol/Drug Father      Arthritis Father      Hypertension Father      CEREBROVASCULAR DISEASE Paternal Grandmother       of a stroke in her 80's     DIABETES Paternal Grandmother      Alcohol/Drug Son      Arthritis Mother      DIABETES Mother      Depression Mother      HEART DISEASE Mother      Neurologic Disorder Mother      Obesity Mother      Psychotic Disorder Mother      Thyroid Disease Mother      Lipids Father      high cholesterol     Gynecology Sister      Precancerous cell removal from cervix at age 45     Depression Sister      Allergies Sister      Alcohol/Drug Sister      Neurologic Disorder Sister      Colon Polyps Sister      Colon Cancer No family hx of      Crohn Disease No family hx of      Ulcerative Colitis No family hx of          Physical Exam:     /70  Pulse 69  Temp 98.2  F (36.8  C) (Oral)  Resp 17  Ht 1.575 m (5' 2\")  Wt 50.3 kg (110 lb 12.8 oz)  SpO2 97%  BMI 20.27 kg/m2  Body mass index is 20.27 kg/(m^2).    General Appearance: healthy and alert, no distress     HEENT:  no thyromegaly, no palpable nodules or masses        Cardiovascular: regular rate and rhythm, no gallops, rubs or murmurs     Respiratory: lungs clear, no rales, rhonchi or wheezes, normal diaphragmatic excursion    Musculoskeletal: extremities non tender and without edema    Skin: no lesions or rashes, scar right lower extremity due to previous gangrene infection    Neurological: normal gait, no gross defects     Psychiatric: appropriate mood and affect                               Hematological: normal cervical, supraclavicular and inguinal lymph nodes     Gastrointestinal:       abdomen soft, non-tender, non-distended, " no organomegaly or masses, central obesity    Genitourinary: External genitalia and urethral meatus appears normal.  Vagina is smooth and has atrophic changes without nodularity or masses.  Cervix appears small, atrophic,.  Bimanual exam reveal no masses, nodularity or fullness. Recto-vaginal exam confirms these findings.    Colposcopy: Consent obtained, Applied acetic acid along vulva and Lugol's solution along vagina.    Colposcopy of entire vagina, vulva and perianal area done today:. Normal external genitalia on exam. Acetic acid applied to vulva. Lesions that appeared like HPV changes on right and left labia minora. Cervix with ACWE at 12 oclock, cervix biopsy done Friable cervix on speculum exam.  ECC done. Pap performed.  Acetic acid applied. No aceto-white epithelium changes present near transformation zone. Lugol's was applied to cervix and vagina. Portion of anterior cervix stained and appeared as low grade epithelial changes.  Silver nitrate was applied for hemostasis. Redundant anal tissue.         Assessment:    Maribel Yang is a 66 year old woman with a diagnosis of immunosuppression related to kidney transplant and recurrent vaginal, vulvar, cervical, and anal dysplasia. She is here today for repeat pap and colposcopy.     Plan:     1.)    Repeat PAP and Colposcopy in 6 months.       Liliana Renteria MD    Department of Ob/Gyn and Women's Health  Division of Gynecologic Oncology  [facility]  [phone]        SPECIMEN(S):   A: Cervical biopsy, 6 o'clock   B: Endocervical curettings     FINAL DIAGNOSIS:   A. CERVIX, 6 O'CLOCK, COLPOSCOPIC BIOPSY:   - Atrophic squamous mucosa   - Transformation zone absent   - No dysplasia or malignancy     B. ENDOCERVIX, CURETTAGE:   - Minute detached fragments of squamous and squamous metaplastic   epithelium   - No dysplasia or malignancy     Pap NIL, HPV + other high risk    CC  JOSEPH GARCIA

## 2017-06-01 ENCOUNTER — ONCOLOGY VISIT (OUTPATIENT)
Dept: ONCOLOGY | Facility: CLINIC | Age: 67
End: 2017-06-01
Attending: OBSTETRICS & GYNECOLOGY
Payer: COMMERCIAL

## 2017-06-01 VITALS
BODY MASS INDEX: 20.39 KG/M2 | WEIGHT: 110.8 LBS | SYSTOLIC BLOOD PRESSURE: 136 MMHG | HEIGHT: 62 IN | DIASTOLIC BLOOD PRESSURE: 70 MMHG | HEART RATE: 69 BPM | TEMPERATURE: 98.2 F | RESPIRATION RATE: 17 BRPM | OXYGEN SATURATION: 97 %

## 2017-06-01 DIAGNOSIS — D07.1 VIN III (VULVAR INTRAEPITHELIAL NEOPLASIA III): Primary | ICD-10-CM

## 2017-06-01 PROCEDURE — 99214 OFFICE O/P EST MOD 30 MIN: CPT | Mod: ZP | Performed by: OBSTETRICS & GYNECOLOGY

## 2017-06-01 PROCEDURE — 87624 HPV HI-RISK TYP POOLED RSLT: CPT | Performed by: OBSTETRICS & GYNECOLOGY

## 2017-06-01 PROCEDURE — 88305 TISSUE EXAM BY PATHOLOGIST: CPT | Performed by: OBSTETRICS & GYNECOLOGY

## 2017-06-01 PROCEDURE — 88175 CYTOPATH C/V AUTO FLUID REDO: CPT | Performed by: OBSTETRICS & GYNECOLOGY

## 2017-06-01 PROCEDURE — 57454 BX/CURETT OF CERVIX W/SCOPE: CPT | Mod: ZF | Performed by: OBSTETRICS & GYNECOLOGY

## 2017-06-01 PROCEDURE — 99212 OFFICE O/P EST SF 10 MIN: CPT | Mod: ZF

## 2017-06-01 ASSESSMENT — ENCOUNTER SYMPTOMS
DIARRHEA: 1
BLOOD IN STOOL: 0
VOMITING: 0
NAUSEA: 0
HEARTBURN: 1
HOT FLASHES: 0
SWOLLEN GLANDS: 0
BOWEL INCONTINENCE: 1
CONSTIPATION: 0
BLOATING: 1
RECTAL PAIN: 0
BRUISES/BLEEDS EASILY: 1
DECREASED LIBIDO: 1
JAUNDICE: 0
RECTAL BLEEDING: 0
ABDOMINAL PAIN: 0

## 2017-06-01 ASSESSMENT — PAIN SCALES - GENERAL: PAINLEVEL: NO PAIN (0)

## 2017-06-01 NOTE — LETTER
"2017       RE: Maribel Yang  [address]  [facility] [address]     Dear Colleague,    Thank you for referring your patient, Maribel Yang, to the [facility]. Please see a copy of my visit note below.    Follow Up Notes on Referred Patient    Date: 17       Dr. Lisa Martinez, DO  [facility]  [address]  [place] [address]       RE: Maribel Yang  : 1950  JESSEE: 17    Dear Dr. Lisa Martinez:    Maribel Yang is a 66 year old woman with a diagnosis of  immunosuppression related to kidney transplant and recurrent vaginal, vulvar, cervical, and anal dysplasia. She is here today for repeat pap and colposcopy.    Brief Cancer History:   : + HPV 6 Low risk   10/07: ASCUS, + HPV 56, 54 & 6. : [place]: TAL II   3/11/08: LEEP - TAL II   : ASCUS, ECC atypia, +HPV 82   : [place] - Atypia   : NIL pap, : NIL pap, neg HPV   : LSIL, + HPV 33 or 45, 61.   5/15/13: [place] - VAIN II & III,TAL II. Referred to gyn onc.   13: [place] with gyn onc. Plan LEEP and CO2 laser to vagina, cx and vaginal vault.   13: Colpo in OR, Co2 laser vagina and vulva , YUNIOR 1- 2, AIN 2-3, VAIN 2-3, LEEP, ECC negative.  13: Had colonoscopy with Dr. Musa which was negative. Post coital bleeding noted; uses lubrication..   Pap ASC-H HPV High Risk types HPV 33/45 DNA.  FINAL DIAGNOSIS:  A. Vagina, posterior distal, biopsy: Benign, nondysplastic squamous epithelium with atrophy.  B. Vulva, left clitoral, biopsy: High grade and low grade squamous intraepithelial lesion (vulvar intraepithelial neoplasia 2 and 1) (see comment).  C. Anus, \"external perianal\", biopsy: High grade and low grade squamous intraepithelial lesion (perianal intraepithelial neoplasia 3 and 1) (see comment).  COMMENT: An HSV immunostain is in process for the left clitoral biopsy material, given the presence of multinucleated cells. The tissue findings correlate " with the concurrent anal Pap test, interpreted as high grade squamous intraepithelial lesion (see report [id number]).   Anal Pap: Epithelial Cell Abnormality: Squamous Cell: High-grade squamous intraepithelial lesion (HSIL) encompassing: moderate and severe dysplasia, carcinoma In Situ/ CIN2 and TAL 3.  Referred to Dr Renteria for treatment.  12/23/13: Per Dr. Renteria: Recommended.Colposcopy, CO2 laser vagina and vulva , possible wide local excision for vulvar and anal dysplasia. Will plan for concurrent procedure with colorectal-Dr. Ace. She has not been seen by colorectal since her last surgery.   2/4/14: PETCT IMPRESSION:   1. There is a hypermetabolic right lung 5.5 SUV cavitary RUL 1.2 x 1.2   cm pulmonary nodule, proven malignancy per history and biopsy.   2. There is a 4 mm RUL nodule series 10 image 17, too small to characterize.   3. In the pelvis and abdomen there is no abnormal uptake.   4. There is right calf edema. Clinical correlation suggested.   5. Left pelvic kidney transplant.  Lung biopsy: Right upper lobe lung carcinoma. Pt deferred gyn treatment until after completing treatment for lung cancer.  3/14: Dx Adeno RUL SBRT: RUL 5400 cGy completed 3/24/14   5/8/14: Pt has completed radiation for new diagnosis of lung cancer. Quit smoking! Did not f/u with surgery with Dr. Renteria.or Dr Mccann due to radiation. Here today for surveillance. She notes no vaginal or rectal bleeding, no vulvar irritation, not sexually active due to dyspareunia. Had previously used estrogen cream but was advised not due to her transplant and risk for blood clots. She is ready for treatment for her known high grade anogenitial dysplasia. Colposcopic impression notes vaginal, vulvar and anal high grade dysplasia without obvious invasion though I am concerned of right labia lesion. ASC-US pap, HPV+  7/15/14: Pap: LSIL. HPV screen: Final Diagnosis:  This patient's sample is positive for high risk HPV DNA.  Diagnosis Comment:  This patient's sample is positive for HPV 33 or 45 DNA. The observed banding pattern does not allow us to conclusively differentiate between these two HPV types. The presence of HPV 33 or 45 DNA in lesions of the anogenital tract is considered a high risk factor for the development of malignancy. Close clinical follow-up is recommended.  Guidelines referenced to assign HPV carcinogenicity are from Keshia VIZCARRA et al. A review of human carcinogens-Part B:biological agents. Lancet Oncol; April 2009;10:321. High risk types: 16, 18, 31, 33, 35, 39, 45, 51, 52, 56, 58, 59, 68   Pathology Biopsies: SPECIMEN(S):  A: Left clitoral biopsy  B: Right anterior perianal skin  FINAL DIAGNOSIS:  A. Vulva, left clitoris, biopsy:  -High grade squamous intraepithelial lesion  -No evidence of invasive malignancy  B: Vulva, right anterior perianal skin, biopsy:  -High grade squamous intraepithelial lesion  -No evidence of invasive malignancy  8/7/2014: Post-op appointment following colposcopy, excisional biopsies, CO2 laser ablation and pap smear.   1) Vulvar colposcopy showed acetowhite changes at the clitoral العراقي that extended down the right labia majora and at the left posterior fourchette.   2) Vaginal colposcopy showed no concerning abnormalities.  3) Left clitoral and right anterior perianal skin (obtained by Dr. Db Carbajal) biopsies showed high grade squamous intraepithelial lesions without invasive malignancy.  4) CO2 laser ablation of tissues with acetowhite changes including the clitoral العراقي, right labia majora, and posterior fourchette.   5) Pap smear: LSIL   6) Positive for high risk HPV DNA  9/23/14: CT Chest: IMPRESSION:  1. Stable spiculated nodule in the right upper lobe compatible with radiotracer squamous cell carcinoma.  2. Moderate to severe centrilobular emphysema.  12/22/14: CT Chest: IMPRESSION:  1. Stable right upper lobe dominant spiculated nodule consistent with squamous cell carcinoma. Additional stable  nodules as above.  2. Small pericardial effusion.  3. Stable left adrenal adenoma.  3/23/15: CT Chest: Impression:  1. Stable postradiation therapy changes to the right upper lobe nodule.  2. No evidence of recurrent disease.  3. End-stage renal disease.  4. Three-vessel coronary artery calcification.  5. Dilatation of the descending thoracic aorta, and an aberrant right subclavian artery.    4/02/15: Vaginal biopsies show VAIN 1 and 2. Pap: ASC-US  FINAL DIAGNOSIS:  Vagina, left suburethral, colposcopic biopsy:  -Low grade and high grade squamous intraepithelial lesion (vaginal  intraepithelial neoplasia 1 and 2)     Patient Name: ERASMO MERINO   MR#: [id number]   Specimen #: [id number]   Collected: 5/26/2016   Received: 5/26/2016   Reported: 5/27/2016 22:05   Ordering Phy(s): GIORGI KWONG     SPECIMEN(S):   Vaginal biopsy, right wall     FINAL DIAGNOSIS:   Vagina, right wall, biopsy:   -Low grade squamous intraepithelial lesion (vaginal intraepithelial   neoplasia 1)   Anal Pap:   Negative for Intraepithelial Lesion or Malignancy   Specimen Adequacy: Satisfactory for evaluation.   -Transformation zone component absent.     Patient Name: ERASMO MERINO   MR#: [id number]   Specimen #: [id number]   Collected: 5/26/2016   Received: 5/27/2016   Reported: 6/6/2016 09:57   Ordering Phy(s): GIORGI KWONG     SPECIMEN/STAIN PROCESS:   Pap imaged thin layer prep screening (Surepath, FocalPoint with guided   screening)        Pap-Cyto x 1, Reflex HPV if NIL/ASCUS/LSIL x 1     SOURCE: Cervical, endocervical   ----------------------------------------------------------------    Pap imaged thin layer prep screening (Surepath, FocalPoint with guided   screening)   SPECIMEN ADEQUACY:   Satisfactory for evaluation.   -Transformation zone component present.     CYTOLOGIC INTERPRETATION:     Epithelial Cell Abnormality:  Glandular Cell:  Atypical-endocervical     6/28/16:   A: Endometrial biopsy   B: Endocervical  curettings     FINAL DIAGNOSIS:   A: Endometrium, biopsy:   -Rare, minute, superficial strips of benign, atrophic endometrial   epithelium   -High grade squamous intraepithelial lesion (cervical intraepithelial   neoplasia 3)   -Cervical transformation zone mucosa present   -Rare detached fragments of unremarkable endocervical tissue   -See comment     B: Endocervix, curettage:   -High grade squamous intraepithelial lesion (cervical intraepithelial   neoplasia 3)   -Cervical transformation zone mucosa not identified   -See comment       8/17/2016  Exam under anesthesia, colposcopy of the vagina, LEEP conization of the cervix, vaginal biopsies.    A: LEEP biopsy   B: Endocervical curettings   C: Vaginal biopsy, apex at 11 o'clock   D: Vaginal biopsy, apex at 4 o'clock   E: Vaginal biopsy, posterior     FINAL DIAGNOSIS:   A: UTERINE CERVIX, LOOP ELECTROSURGICAL EXCISION PROCEDURE (LEEP):   - Low grade squamous intraepithelial lesion (cervical intraepithelial   neoplasia 1)   - Partial denudation of superficial epithelium   - Margins negative for dysplasia     B: ENDOCERVIX, CURETTAGE:   - Cervical epithelium with reactive change     C: VAGINA, APEX AT 11:00 POSITION, BIOPSY:   - Connective tissue with patchy chronic inflammation and scattered foci   of reactive epithelium suggestive of denuded squamous mucosa     D: VAGINA, APEX AT 4:00 POSITION, BIOPSY:   - Squamous mucosa with reactive changes     E: VAGINA, POSTERIOR, BIOPSY:   - Low grade squamous intraepithelial lesion (vaginal intraepithelial   neoplasia 1)       Today: She feels well. No vaginal or rectal bleeding. Endorses feeling like she is having discharge but there is not any liquid, discharge, or malodor on her panty liners. Does not think she is having urinary incontinence.     Answers for HPI/ROS submitted by the patient on 6/1/2017   General Symptoms: No  Skin Symptoms: No  HENT Symptoms: No  EYE SYMPTOMS: No  HEART SYMPTOMS: No  LUNG SYMPTOMS:  No  INTESTINAL SYMPTOMS: Yes  URINARY SYMPTOMS: No  GYNECOLOGIC SYMPTOMS: Yes  BREAST SYMPTOMS: No  SKELETAL SYMPTOMS: No  BLOOD SYMPTOMS: Yes  NERVOUS SYSTEM SYMPTOMS: No  MENTAL HEALTH SYMPTOMS: No  Heart burn or indigestion: Yes  Nausea: No  Vomiting: No  Abdominal pain: No  Bloating: Yes  Constipation: No  Diarrhea: Yes  Blood in stool: No  Black stools: No  Rectal or Anal pain: No  Fecal incontinence: Yes  Rectal bleeding: No  Yellowing of skin or eyes: No  Vomit with blood: No  Change in stools: Yes  Hemorrhoids: No  Anemia: No  Swollen glands: No  Easy bleeding or bruising: Yes  Edema or swelling: Yes  Bleeding or spotting between periods: No  Heavy or painful periods: No  Irregular periods: No  Vaginal discharge: Yes  Hot flashes: No  Vaginal dryness: Yes  Genital ulcers: No  Reduced libido: Yes  Painful intercourse: Yes  Difficulty with sexual arousal: Yes  Post-menopausal bleeding: No    I have reviewed and addressed the patient's review of symptoms for today's visit.     Past Medical History:    Past Medical History:   Diagnosis Date     Abnormal coagulation profile     p [id number] heterozygote      Abnormal Papanicolaou smear of cervix and cervical HPV     Many years ago -- had colposcopies -- normal for years     Acute venous embolism and thrombosis of other specified veins      Anemia      Antiplatelet or antithrombotic long-term use      ASCUS with positive high risk HPV 2007, 2015    + HPV 56, 54,& 6, colp - TAL II, Leep =TAL II     Difficulty in walking(719.7)      High risk medication use      Hypertension      Immunosuppressed status (H)      Kidney replaced by transplant 9/04    Living donor recipient,  Rejection 7/2005     LSIL (low grade squamous intraepithelial lesion) on Pap smear 4/2013    +HPV 33 or 45, 61       Migraine, unspecified, without mention of intractable migraine without mention of status migrainosus      NONSPECIFIC MEDICAL HISTORY     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     Other specified viral warts 2007    Rectal warts -- HPV type 6 -- low risk     PONV (postoperative nausea and vomiting)      Renal disease      Squamous cell lung cancer (H)      Thrombosis of leg      Unspecified disorder of kidney and ureter     X-linked dominant Alport's syndrome.         Past Surgical History:    Past Surgical History:   Procedure Laterality Date     C NONSPECIFIC PROCEDURE      Thrombectomy     C NONSPECIFIC PROCEDURE  1955 and 1959    Bilater eye surgery - correction for crossed eyes     C NONSPECIFIC PROCEDURE  1998    oopherectomy L     C NONSPECIFIC PROCEDURE  1967    open kidney biopsy - L     C TRANSPLANTATION OF KIDNEY  9/04    recipient -- done at [facility]     COLONOSCOPY       COLPOSCOPY,LOOP ELECTRD CERVIX EXCIS  03/11/08    TAL II     CONIZATION LEEP  7/17/2013    Procedure: CONIZATION LEEP;;  Surgeon: Liliana Renteria MD;  Location:  OR     CONIZATION LEEP N/A 8/17/2016    Procedure: CONIZATION LEEP;  Surgeon: Liliana Renteria MD;  Location:  OR     EXAM UNDER ANESTHESIA ANUS  7/15/2014    Procedure: EXAM UNDER ANESTHESIA ANUS;  Surgeon: Radha Musa MD;  Location:  OR     EYE SURGERY       LASER CO2 EXCISE VULVA WIDE LOCAL  7/15/2014    Procedure: LASER CO2 EXCISE VULVA WIDE LOCAL;  Surgeon: Liliana Renteria MD;  Location:  OR     LASER CO2 VAGINA  7/17/2013    Procedure: LASER CO2 VAGINA;;  Surgeon: Liliana Renteria MD;  Location:  OR     MICROSCOPY ANAL  7/17/2013    Procedure: MICROSCOPY ANAL;  Anal Microscopy,  EUA vagina,Colposcopy Of Vagina And Vulva, Vaginal Biopsies, Omniguide Co2 Laser To Vagina and vulva, Loop Electrosurgical Excision Procedure To Cervix;  Surgeon: Radha Musa MD;  Location:  OR     MICROSCOPY ANAL  7/15/2014    Procedure: MICROSCOPY ANAL;  Surgeon: Radha Musa MD;  Location:  OR         Health Maintenance Due   Topic Date Due      HEPATITIS C SCREENING  12/15/1970     PAP Q6 MOS DIAGNOSTIC  02/17/2017     COLPOSCOPY Q6 MOS  02/17/2017     MAMMO Q1 YR  06/01/2017       Current Medications:     Current Outpatient Prescriptions   Medication Sig Dispense Refill     clopidogrel (PLAVIX) 75 MG tablet TAKE ONE TABLET BY MOUTH EVERY DAY 90 tablet 0     cilostazol (PLETAL) 100 MG tablet TAKE ONE TABLET BY MOUTH TWICE A  tablet 0     metoprolol (LOPRESSOR) 50 MG tablet TAKE TWO TABLETS (100MG) BY MOUTH TWICE A  tablet 1     losartan (COZAAR) 25 MG tablet Take 0.5 tablets (12.5 mg) by mouth daily 45 tablet 1     atorvastatin (LIPITOR) 20 MG tablet Take 1 tablet (20 mg) by mouth daily 30 tablet 3     metoprolol (LOPRESSOR) 50 MG tablet TAKE TWO TABLETS BY MOUTH TWICE A DAY 60 tablet 0     Urea 20 % CREA Externally apply topically daily 200 g 3     cilostazol (PLETAL) 100 MG tablet TAKE ONE TABLET BY MOUTH TWICE A  tablet 0     clopidogrel (PLAVIX) 75 MG tablet TAKE ONE TABLET BY MOUTH EVERY DAY 90 tablet 0     sirolimus (RAPAMUNE - GENERIC EQUIVALENT) 1 MG tablet Take 2 tablets (2 mg) by mouth daily 180 tablet 3     mycophenolic acid (MYFORTIC - GENERIC EQUIVALENT) 180 MG EC tablet Take 1 tablet (180 mg) by mouth 2 times daily 180 tablet 3     NIFEdipine ER osmotic (PROCARDIA XL) 90 MG 24 hr tablet Take 1 tablet (90 mg) by mouth daily 90 tablet 3     amoxicillin (AMOXIL) 500 MG capsule Take 4 capsules (2,000 mg) by mouth once as needed Prior to dental procedures 16 capsule 3     predniSONE (DELTASONE) 5 MG tablet Take 1 tablet (5 mg) by mouth daily 90 tablet 3     acetaminophen-codeine (TYLENOL/CODEINE #3) 300-30 MG per tablet Take 1-2 tablets by mouth every 6 hours as needed for pain 20 tablet 0     ORDER FOR DME Equipment being ordered: TENS 1 Device 0     CALCIUM 500 MG OR TABS 1 tab bid  0         Allergies:        Allergies   Allergen Reactions     Fentanyl Nausea     Hydrocodone Nausea and Vomiting and Hives     Ultracet Nausea  "and Vomiting and Hives        Social History:     Social History   Substance Use Topics     Smoking status: Former Smoker     Packs/day: 0.30     Years: 35.00     Types: Cigarettes     Quit date: 2014     Smokeless tobacco: Never Used      Comment: on and off     Alcohol use 0.0 oz/week     0 Standard drinks or equivalent per week      Comment: rare       History   Drug Use No         Family History:       Family History   Problem Relation Age of Onset     DIABETES Father      type 2 diag age,60's     Alcohol/Drug Father      Arthritis Father      Hypertension Father      CEREBROVASCULAR DISEASE Paternal Grandmother       of a stroke in her 80's     DIABETES Paternal Grandmother      Alcohol/Drug Son      Arthritis Mother      DIABETES Mother      Depression Mother      HEART DISEASE Mother      Neurologic Disorder Mother      Obesity Mother      Psychotic Disorder Mother      Thyroid Disease Mother      Lipids Father      high cholesterol     Gynecology Sister      Precancerous cell removal from cervix at age 45     Depression Sister      Allergies Sister      Alcohol/Drug Sister      Neurologic Disorder Sister      Colon Polyps Sister      Colon Cancer No family hx of      Crohn Disease No family hx of      Ulcerative Colitis No family hx of          Physical Exam:     /70  Pulse 69  Temp 98.2  F (36.8  C) (Oral)  Resp 17  Ht 1.575 m (5' 2\")  Wt 50.3 kg (110 lb 12.8 oz)  SpO2 97%  BMI 20.27 kg/m2  Body mass index is 20.27 kg/(m^2).    General Appearance: healthy and alert, no distress     HEENT:  no thyromegaly, no palpable nodules or masses        Cardiovascular: regular rate and rhythm, no gallops, rubs or murmurs     Respiratory: lungs clear, no rales, rhonchi or wheezes, normal diaphragmatic excursion    Musculoskeletal: extremities non tender and without edema    Skin: no lesions or rashes, scar right lower extremity due to previous gangrene infection    Neurological: normal gait, no " gross defects     Psychiatric: appropriate mood and affect                               Hematological: normal cervical, supraclavicular and inguinal lymph nodes     Gastrointestinal:       abdomen soft, non-tender, non-distended, no organomegaly or masses, central obesity    Genitourinary: External genitalia and urethral meatus appears normal.  Vagina is smooth and has atrophic changes without nodularity or masses.  Cervix appears small, atrophic,.  Bimanual exam reveal no masses, nodularity or fullness. Recto-vaginal exam confirms these findings.    Colposcopy: Consent obtained, Applied acetic acid along vulva and Lugol's solution along vagina.    Colposcopy of entire vagina, vulva and perianal area done today:. Normal external genitalia on exam. Acetic acid applied to vulva. Lesions that appeared like HPV changes on right and left labia minora. Cervix with ACWE at 12 oclock, cervix biopsy done Friable cervix on speculum exam.  ECC done. Pap performed.  Acetic acid applied. No aceto-white epithelium changes present near transformation zone. Lugol's was applied to cervix and vagina. Portion of anterior cervix stained and appeared as low grade epithelial changes.  Silver nitrate was applied for hemostasis. Redundant anal tissue.         Assessment:    Maribel Yang is a 66 year old woman with a diagnosis of immunosuppression related to kidney transplant and recurrent vaginal, vulvar, cervical, and anal dysplasia. She is here today for repeat pap and colposcopy.     Plan:     1.)    Repeat PAP and Colposcopy in 6 months.       Liliana Renteria MD    Department of Ob/Gyn and Women's Health  Division of Gynecologic Oncology  [facility]  [phone]        SPECIMEN(S):   A: Cervical biopsy, 6 o'clock   B: Endocervical curettings     FINAL DIAGNOSIS:   A. CERVIX, 6 O'CLOCK, COLPOSCOPIC BIOPSY:   - Atrophic squamous mucosa   - Transformation zone absent   - No dysplasia or  malignancy     B. ENDOCERVIX, CURETTAGE:   - Minute detached fragments of squamous and squamous metaplastic   epithelium   - No dysplasia or malignancy     Pap NIL, HPV + other high risk    Again, thank you for allowing me to participate in the care of your patient.      Sincerely,    MD ALLIE Bhat PAMELA J

## 2017-06-01 NOTE — NURSING NOTE
"Oncology Rooming Note    June 1, 2017 12:57 PM   Maribel Yang is a 66 year old female who presents for:    Chief Complaint   Patient presents with     Oncology Clinic Visit     return patient visit for colposcopy related to YUNIOR III (vulvar intraepithelial neoplasia III)     Initial Vitals: /70  Pulse 69  Temp 98.2  F (36.8  C) (Oral)  Resp 17  Ht 1.575 m (5' 2\")  Wt 50.3 kg (110 lb 12.8 oz)  SpO2 97%  BMI 20.27 kg/m2 Estimated body mass index is 20.27 kg/(m^2) as calculated from the following:    Height as of this encounter: 1.575 m (5' 2\").    Weight as of this encounter: 50.3 kg (110 lb 12.8 oz). Body surface area is 1.48 meters squared.  No Pain (0) Comment: Data Unavailable   No LMP recorded. Patient is postmenopausal.  Allergies reviewed: Yes  Medications reviewed: Yes    Medications: Medication refills not needed today.  Pharmacy name entered into [employer]:    [facility]  [facility] - [place], [place] - [facility] [address]    Clinical concerns: none dr. rowe was notified.    5 minutes for nursing intake (face to face time)     Amber Church CMA              "
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
## 2017-06-05 ENCOUNTER — CARE COORDINATION (OUTPATIENT)
Dept: CARE COORDINATION | Facility: CLINIC | Age: 67
End: 2017-06-05

## 2017-06-05 NOTE — LETTER
[facility]  [address] [address]  Phone: [phone]      June 5, 2017      Maribel Yang  [address]  [place] [address]    Dear Maribel,  I am the Clinic Care Coordinator that works with your primary care provider's clinic. I have been trying to reach you recently to introduce Clinic Care Coordination and to see if there was anything I could assist you with.  Below is a description of what Clinic Care Coordination is and how I can further assist you.     The Clinic Care Coordinator role is a Registered Nurse and/or  who understands the health care system. The goal of Clinic Care Coordination is to help you manage your health and improve access to the [facility] system in the most efficient manner.  The Registered Nurse can assist you in meeting your health care goals by providing education, coordinating services, and strengthening the communication among your providers. The  can assist you with financial, behavioral, psychosocial, and chemical dependency and counseling/psychiatric resources.    Please feel free to keep this letter and contact information to contact me at [phone] with any further questions or concerns that may arise. We at [facility] are focused on providing you with the highest-quality healthcare experience possible and that all starts with you.       Sincerely,         Amisha Zavala RN  Clinic Care Coordinator   [facility]   Phone: [phone]

## 2017-06-05 NOTE — PROGRESS NOTES
Clinic Care Coordination Contact  [facility]/Voicemail    Referral Source: Pro-Active Outreach  Clinical Data: Care Coordinator Outreach  Outreach attempted x 1.  Left message on voicemail with call back information and requested return call.  Plan: Care Coordinator will mail out care coordination introduction letter with care coordinator contact information and explanation of care coordination services. Care Coordinator will try to reach patient again in 1-2 business days.

## 2017-06-06 LAB
COPATH REPORT: NORMAL
COPATH REPORT: NORMAL
PAP: NORMAL

## 2017-06-07 LAB
FINAL DIAGNOSIS: ABNORMAL
HPV HR 12 DNA CVX QL NAA+PROBE: POSITIVE
HPV16 DNA SPEC QL NAA+PROBE: NEGATIVE
HPV18 DNA SPEC QL NAA+PROBE: NEGATIVE
SPECIMEN DESCRIPTION: ABNORMAL

## 2017-06-13 DIAGNOSIS — Z94.0 KIDNEY REPLACED BY TRANSPLANT: Primary | ICD-10-CM

## 2017-06-14 RX ORDER — PREDNISONE 5 MG/1
5 TABLET ORAL DAILY
Qty: 90 TABLET | Refills: 0 | Status: SHIPPED | OUTPATIENT
Start: 2017-06-14 | End: 2017-09-12

## 2017-06-23 ENCOUNTER — OFFICE VISIT (OUTPATIENT)
Dept: FAMILY MEDICINE | Facility: CLINIC | Age: 67
End: 2017-06-23
Payer: COMMERCIAL

## 2017-06-23 VITALS
SYSTOLIC BLOOD PRESSURE: 132 MMHG | BODY MASS INDEX: 19.49 KG/M2 | HEIGHT: 62 IN | WEIGHT: 105.9 LBS | HEART RATE: 94 BPM | DIASTOLIC BLOOD PRESSURE: 64 MMHG | TEMPERATURE: 97.1 F | OXYGEN SATURATION: 95 %

## 2017-06-23 DIAGNOSIS — R19.7 DIARRHEA, UNSPECIFIED TYPE: Primary | ICD-10-CM

## 2017-06-23 PROCEDURE — 99214 OFFICE O/P EST MOD 30 MIN: CPT | Performed by: FAMILY MEDICINE

## 2017-06-23 PROCEDURE — 84443 ASSAY THYROID STIM HORMONE: CPT | Performed by: FAMILY MEDICINE

## 2017-06-23 PROCEDURE — 36415 COLL VENOUS BLD VENIPUNCTURE: CPT | Performed by: FAMILY MEDICINE

## 2017-06-23 RX ORDER — LACTOBACILLUS RHAMNOSUS GG 10B CELL
1 CAPSULE ORAL 2 TIMES DAILY
Qty: 60 CAPSULE | Refills: 3 | Status: SHIPPED | OUTPATIENT
Start: 2017-06-23 | End: 2018-08-07

## 2017-06-23 RX ORDER — LACTOBACILLUS RHAMNOSUS GG 10B CELL
1 CAPSULE ORAL DAILY
Qty: 100 CAPSULE | Refills: 3 | Status: SHIPPED | OUTPATIENT
Start: 2017-06-23 | End: 2017-06-23

## 2017-06-23 NOTE — NURSING NOTE
"Chief Complaint   Patient presents with     Diarrhea     feels like getting worse lately     /64  Pulse 94  Temp 97.1  F (36.2  C) (Oral)  Ht 5' 2\" (1.575 m)  Wt 105 lb 14.4 oz (48 kg)  SpO2 95%  BMI 19.37 kg/m2 Estimated body mass index is 19.37 kg/(m^2) as calculated from the following:    Height as of this encounter: 5' 2\" (1.575 m).    Weight as of this encounter: 105 lb 14.4 oz (48 kg).  Medication Reconciliation: complete      Health Maintenance due pending provider review:  Mammogram    Aware due, will sched    Mireya Mays CMA  "
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
## 2017-06-24 LAB — TSH SERPL DL<=0.005 MIU/L-ACNC: 0.73 MU/L (ref 0.4–4)

## 2017-06-27 NOTE — PROGRESS NOTES
Dear Maribel,   Your test results are all back -   -All of your labs are normal.  Let us know if you have any questions.  -Lisa Martinez, DO

## 2017-07-11 DIAGNOSIS — R19.7 DIARRHEA, UNSPECIFIED TYPE: ICD-10-CM

## 2017-07-11 LAB
C DIFF TOX B STL QL: NORMAL
CAMPYLOBACTER GROUP BY NAT: NOT DETECTED
ENTERIC PATHOGEN COMMENT: NORMAL
NOROVIRUS I AND II BY NAT: NOT DETECTED
ROTAVIRUS A BY NAT: NOT DETECTED
SALMONELLA SPECIES BY NAT: NOT DETECTED
SHIGA TOXIN 1 GENE BY NAT: NOT DETECTED
SHIGA TOXIN 2 GENE BY NAT: NOT DETECTED
SHIGELLA SP+EIEC IPAH STL QL NAA+PROBE: NOT DETECTED
SPECIMEN SOURCE: NORMAL
VIBRIO GROUP BY NAT: NOT DETECTED
YERSINIA ENTEROCOLITICA BY NAT: NOT DETECTED

## 2017-07-11 PROCEDURE — 87328 CRYPTOSPORIDIUM AG IA: CPT | Mod: 90 | Performed by: FAMILY MEDICINE

## 2017-07-11 PROCEDURE — 87506 IADNA-DNA/RNA PROBE TQ 6-11: CPT | Performed by: FAMILY MEDICINE

## 2017-07-11 PROCEDURE — 87209 SMEAR COMPLEX STAIN: CPT | Performed by: FAMILY MEDICINE

## 2017-07-11 PROCEDURE — 87493 C DIFF AMPLIFIED PROBE: CPT | Performed by: FAMILY MEDICINE

## 2017-07-11 PROCEDURE — 99000 SPECIMEN HANDLING OFFICE-LAB: CPT | Performed by: FAMILY MEDICINE

## 2017-07-11 PROCEDURE — 87177 OVA AND PARASITES SMEARS: CPT | Performed by: FAMILY MEDICINE

## 2017-07-11 PROCEDURE — 87329 GIARDIA AG IA: CPT | Performed by: FAMILY MEDICINE

## 2017-07-12 ENCOUNTER — CARE COORDINATION (OUTPATIENT)
Dept: CARE COORDINATION | Facility: CLINIC | Age: 67
End: 2017-07-12

## 2017-07-12 DIAGNOSIS — Z94.0 KIDNEY REPLACED BY TRANSPLANT: ICD-10-CM

## 2017-07-12 DIAGNOSIS — I10 HTN (HYPERTENSION): Primary | ICD-10-CM

## 2017-07-12 DIAGNOSIS — E78.00 HYPERCHOLESTEREMIA: ICD-10-CM

## 2017-07-12 LAB
G LAMBLIA AG STL QL IA: NORMAL
MICRO REPORT STATUS: NORMAL
MICRO REPORT STATUS: NORMAL
O+P STL MICRO: NORMAL
SPECIMEN SOURCE: NORMAL
SPECIMEN SOURCE: NORMAL

## 2017-07-12 RX ORDER — NIFEDIPINE 90 MG/1
90 TABLET, EXTENDED RELEASE ORAL DAILY
Qty: 90 TABLET | Refills: 3 | Status: SHIPPED | OUTPATIENT
Start: 2017-07-12 | End: 2018-07-19

## 2017-07-12 NOTE — PROGRESS NOTES
Clinic Care Coordination Contact  [facility]/Voicemail    Referral Source: Pro-Active Outreach  Clinical Data: Care Coordinator Outreach  Outreach attempted x 2.  Left message on voicemail with call back information and requested return call.  Plan: Care Coordinator mailed out care coordination introduction letter . Care Coordinator will try to reach patient again in 3-5 business days.

## 2017-07-12 NOTE — TELEPHONE ENCOUNTER
Drug Name: sirolimus 1mg  Last Fill Date: 6/14/17  Quantity: 180      Drug Name: mycophenolate 180mg  Last Fill Date: 6/14/17  Quantity: 180              Roya Kenny   [facility]  [phone]

## 2017-07-12 NOTE — TELEPHONE ENCOUNTER
Drug Name: nifedipine er 90mg  Last Fill Date: 6/14/17  Quantity: 90    Roya Kenny   [facility]  [phone]

## 2017-07-13 LAB — CRYPTOSP AG STL QL IA: NORMAL

## 2017-07-13 RX ORDER — SIROLIMUS 1 MG/1
2 TABLET, FILM COATED ORAL DAILY
Qty: 180 TABLET | Refills: 3 | Status: SHIPPED | OUTPATIENT
Start: 2017-07-13 | End: 2018-07-19

## 2017-07-17 DIAGNOSIS — Z94.0 KIDNEY REPLACED BY TRANSPLANT: Primary | ICD-10-CM

## 2017-07-17 RX ORDER — MYCOPHENOLIC ACID 180 MG/1
180 TABLET, DELAYED RELEASE ORAL 2 TIMES DAILY
Qty: 180 TABLET | Refills: 3 | Status: SHIPPED | OUTPATIENT
Start: 2017-07-17 | End: 2018-05-03

## 2017-07-17 RX ORDER — MYCOPHENOLIC ACID 180 MG/1
180 TABLET, DELAYED RELEASE ORAL 2 TIMES DAILY
COMMUNITY
Start: 2017-07-17 | End: 2017-07-17

## 2017-07-17 NOTE — TELEPHONE ENCOUNTER
Drug Name: mycophenolate 180mg  Last Fill Date: 6/14/17  Quantity: 180    Roya Efraín   [facility]  [phone]

## 2017-07-18 RX ORDER — ATORVASTATIN CALCIUM 20 MG/1
TABLET, FILM COATED ORAL
Qty: 90 TABLET | Refills: 3 | Status: SHIPPED | OUTPATIENT
Start: 2017-07-18 | End: 2018-08-07

## 2017-07-19 ENCOUNTER — HOSPITAL ENCOUNTER (OUTPATIENT)
Facility: CLINIC | Age: 67
End: 2017-07-19
Attending: INTERNAL MEDICINE | Admitting: INTERNAL MEDICINE

## 2017-07-19 ENCOUNTER — TELEPHONE (OUTPATIENT)
Dept: GASTROENTEROLOGY | Facility: CLINIC | Age: 67
End: 2017-07-19

## 2017-07-21 ENCOUNTER — TELEPHONE (OUTPATIENT)
Dept: GASTROENTEROLOGY | Facility: CLINIC | Age: 67
End: 2017-07-21

## 2017-07-21 DIAGNOSIS — Z12.11 ENCOUNTER FOR SCREENING COLONOSCOPY: Primary | ICD-10-CM

## 2017-07-21 NOTE — TELEPHONE ENCOUNTER
Patient scheduled for Colonoscopy    Indication for procedure. Diarrhea, unspecified type    Referring Provider. Lisa Martinez, DO    ? Not Needed    Arrival time verified? Yes, 1020    Facility location verified? Yes, 1020    Instructions given regarding prep and procedure    Prep Type Golytely    Are you taking any anticoagulants or blood thinners? Plavix and Pletal    Instructions given? Per patient PCP instructed her to hold Plavix and Pletal 5 days prior to procedure    Electronic implanted devices? No    Pre procedure teaching completed? Yes    Transportation from procedure?     H&P / Pre op physical completed? N/A

## 2017-08-02 DIAGNOSIS — C34.91 SQUAMOUS CELL CARCINOMA OF RIGHT LUNG (H): Primary | ICD-10-CM

## 2017-08-03 ENCOUNTER — TELEPHONE (OUTPATIENT)
Dept: GASTROENTEROLOGY | Facility: CLINIC | Age: 67
End: 2017-08-03

## 2017-08-03 NOTE — TELEPHONE ENCOUNTER
Patient scheduled for Colonoscopy     Indication for procedure. Diarrhea, unspecified type     Referring Provider. Lisa Martinez, DO     ? Not Needed     Arrival time verified? Yes, 0740      Facility location verified? Yes, [address]     Instructions given regarding prep and procedure     Prep Type Golytely     Are you taking any anticoagulants or blood thinners? Plavix and Pletal     Instructions given? Per patient PCP instructed her to hold Plavix and Pletal 5 days prior to procedure     Electronic implanted devices? No     Pre procedure teaching completed? Yes     Transportation from procedure?      H&P / Pre op physical completed? N/A

## 2017-08-05 ENCOUNTER — HEALTH MAINTENANCE LETTER (OUTPATIENT)
Age: 67
End: 2017-08-05

## 2017-08-09 ENCOUNTER — HOSPITAL ENCOUNTER (OUTPATIENT)
Facility: CLINIC | Age: 67
Discharge: HOME | End: 2017-08-09
Attending: INTERNAL MEDICINE | Admitting: INTERNAL MEDICINE
Payer: COMMERCIAL

## 2017-08-09 VITALS
SYSTOLIC BLOOD PRESSURE: 138 MMHG | HEART RATE: 54 BPM | OXYGEN SATURATION: 94 % | DIASTOLIC BLOOD PRESSURE: 57 MMHG | RESPIRATION RATE: 14 BRPM

## 2017-08-09 LAB — COLONOSCOPY: NORMAL

## 2017-08-09 PROCEDURE — G0500 MOD SEDAT ENDO SERVICE >5YRS: HCPCS | Performed by: INTERNAL MEDICINE

## 2017-08-09 PROCEDURE — 45380 COLONOSCOPY AND BIOPSY: CPT | Performed by: INTERNAL MEDICINE

## 2017-08-09 PROCEDURE — 88305 TISSUE EXAM BY PATHOLOGIST: CPT | Performed by: INTERNAL MEDICINE

## 2017-08-09 PROCEDURE — 25000132 ZZH RX MED GY IP 250 OP 250 PS 637: Performed by: INTERNAL MEDICINE

## 2017-08-09 PROCEDURE — 25000128 H RX IP 250 OP 636: Performed by: INTERNAL MEDICINE

## 2017-08-09 PROCEDURE — 99153 MOD SED SAME PHYS/QHP EA: CPT | Performed by: INTERNAL MEDICINE

## 2017-08-09 RX ORDER — SIMETHICONE
LIQUID (ML) MISCELLANEOUS PRN
Status: DISCONTINUED | OUTPATIENT
Start: 2017-08-09 | End: 2017-08-14 | Stop reason: HOSPADM

## 2017-08-09 RX ORDER — ONDANSETRON 2 MG/ML
4 INJECTION INTRAMUSCULAR; INTRAVENOUS
Status: COMPLETED | OUTPATIENT
Start: 2017-08-09 | End: 2017-08-09

## 2017-08-09 RX ORDER — FENTANYL CITRATE 50 UG/ML
INJECTION, SOLUTION INTRAMUSCULAR; INTRAVENOUS PRN
Status: DISCONTINUED | OUTPATIENT
Start: 2017-08-09 | End: 2017-08-14 | Stop reason: HOSPADM

## 2017-08-09 RX ORDER — LIDOCAINE 40 MG/G
CREAM TOPICAL
Status: DISCONTINUED | OUTPATIENT
Start: 2017-08-09 | End: 2017-08-14 | Stop reason: HOSPADM

## 2017-08-09 NOTE — IP AVS SNAPSHOT
[facility], [place], Endoscopy    [address] [address]    Phone:  [phone]                                       After Visit Summary   8/9/2017    Maribel Yang    MRN: [id number]           After Visit Summary Signature Page     I have received my discharge instructions, and my questions have been answered. I have discussed any challenges I see with this plan with the nurse or doctor.    ..........................................................................................................................................  Patient/Patient Representative Signature      ..........................................................................................................................................  Patient Representative Print Name and Relationship to Patient    ..................................................               ................................................  Date                                            Time    ..........................................................................................................................................  Reviewed by Signature/Title    ...................................................              ..............................................  Date                                                            Time
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
## 2017-08-09 NOTE — OR NURSING
Colonoscopy with biopsies and polypectomies x2 via cold biopsy forceps completed.  Pt tolerated procedure fair under conscious sedation.  Sedation given per MD instruction.  Pt taken to recovery by RN.

## 2017-08-09 NOTE — DISCHARGE INSTRUCTIONS
Discharge Instructions after Colonoscopy with biopsies and polyp removal by Dr Hyatt    Activity and Diet  You were given medicine for pain. You may be dizzy or sleepy.  For 24 hours:    Do not drive or use heavy equipment.    Do not make important decisions.    Do not drink any alcohol.  You may return to your normal diet and medicines.    Discomfort    Air was placed in your colon during the exam in order to see it. Walking helps to pass the air.    You may take Tylenol (acetaminophen) for pain unless your doctor has told you not to.    WHEN TO START PLAVIX____________________________?    Follow-up  __X__ We took small tissue samples and polyps to study. Your doctor will send you the results  within two weeks.    When to call:    Call right away if you have:    Unusual pain in belly or chest pain not relieved with passing air.    More than 1 to 2 Tablespoons of bleeding from your rectum.    Fever above 100.6  F (37.5  C).    If you have severe pain, bleeding, or shortness of breath, go to an emergency room.    If you have questions, call:  Monday to Friday, 7 a.m. to 4:30 p.m.  Endoscopy: [phone] (We may have to call you back)    After hours  Hospital: [phone] (Ask for the GI fellow on call)

## 2017-08-10 LAB — COPATH REPORT: NORMAL

## 2017-08-14 DIAGNOSIS — I73.9 PERIPHERAL ARTERY DISEASE (H): ICD-10-CM

## 2017-08-14 DIAGNOSIS — I12.9 RENAL HYPERTENSION, STAGE 1-4 OR UNSPECIFIED CHRONIC KIDNEY DISEASE: ICD-10-CM

## 2017-08-14 NOTE — TELEPHONE ENCOUNTER
Pending Prescriptions:                       Disp   Refills    clopidogrel (PLAVIX) 75 MG tablet [Pharmac*90 tab*0        Sig: TAKE ONE TABLET BY MOUTH EVERY DAY    cilostazol (PLETAL) 100 MG tablet [Pharmac*180 ta*0        Sig: TAKE ONE TABLET BY MOUTH TWICE A DAY      PLAVIX           Last Written Prescription Date: 5/10/2017  Last Fill Quantity: 90, # refills: 0    Last Office Visit with [facility] or  Health prescribing provider:  6/23/2017   Future Office Visit:    Next 5 appointments (look out 90 days)     Aug 15, 2017  1:30 PM CDT   Screening Mammogram with [address] [facility] ([facility])    [address] [place] [address], [address]  [facility] [address] [phone]                   Lab Results   Component Value Date    WBC 5.3 01/31/2017     Lab Results   Component Value Date    RBC 4.90 01/31/2017     Lab Results   Component Value Date    HGB 14.0 01/31/2017     Lab Results   Component Value Date    HCT 42.6 01/31/2017     No components found for: MCT  Lab Results   Component Value Date    MCV 87 01/31/2017     Lab Results   Component Value Date    MCH 28.6 01/31/2017     Lab Results   Component Value Date    MCHC 32.9 01/31/2017     Lab Results   Component Value Date    RDW 13.3 01/31/2017     Lab Results   Component Value Date     01/31/2017     Lab Results   Component Value Date    AST 24 07/22/2014     Lab Results   Component Value Date    ALT 21 07/22/2014     Creatinine   Date Value Ref Range Status   01/31/2017 1.47 (H) 0.52 - 1.04 mg/dL Final   ]  PLETAL      Last Written Prescription Date:  5/10/2017  Last Fill Quantity: 180,   # refills: 0  Last Office Visit with [facility] or  Health prescribing provider: 6/23/2017  Future Office visit:    Next 5 appointments (look out 90 days)     Aug 15, 2017  1:30 PM CDT   Screening Mammogram with [facility] [facility] ([facility])    [address] [place] [address], [address]  [facility] [address] [phone]                    Routing refill request to provider for review/approval because:  Drug not on the [facility], [facility] or [facility] refill protocol or controlled substance

## 2017-08-14 NOTE — TELEPHONE ENCOUNTER
PN,    Routing refill request to provider for review/approval because:  Drug not on the [facility] refill protocol- Pletal    Plavix- Labs not current:  Last AST/ALT in 2014.    Please authorize if appropriate.  Thanks,  Toshia Beckwith RN

## 2017-08-15 RX ORDER — CLOPIDOGREL BISULFATE 75 MG/1
TABLET ORAL
Qty: 90 TABLET | Refills: 1 | Status: SHIPPED | OUTPATIENT
Start: 2017-08-15 | End: 2018-03-06

## 2017-08-15 RX ORDER — CILOSTAZOL 100 MG/1
TABLET ORAL
Qty: 180 TABLET | Refills: 1 | Status: SHIPPED | OUTPATIENT
Start: 2017-08-15 | End: 2018-03-06

## 2017-08-17 RX ORDER — LOSARTAN POTASSIUM 25 MG/1
TABLET ORAL
Qty: 45 TABLET | Refills: 3 | Status: SHIPPED | OUTPATIENT
Start: 2017-08-17 | End: 2018-08-07

## 2017-08-31 ENCOUNTER — TELEPHONE (OUTPATIENT)
Dept: PHARMACY | Facility: OTHER | Age: 67
End: 2017-08-31

## 2017-08-31 NOTE — TELEPHONE ENCOUNTER
MTM referral from: [facility] visit (referral by provider)    MTM referral outreach attempt #1 on August 31, 2017 at 2:02 PM      Outcome: Left Message    Ayanna Baig MTM Coordinator

## 2017-09-05 NOTE — TELEPHONE ENCOUNTER
MTM referral from: [facility] visit (referral by provider)    MTM referral outreach attempt #2 on September 5, 2017 at 3:52 PM      Outcome: Patient not reachable after several attempts, will route to MTM Pharmacist/Provider as an FYI. Thank you for the referral.    Ayanna Baig, MTM Coordinator

## 2017-09-06 ENCOUNTER — OFFICE VISIT (OUTPATIENT)
Dept: FAMILY MEDICINE | Facility: CLINIC | Age: 67
End: 2017-09-06
Payer: COMMERCIAL

## 2017-09-06 VITALS
DIASTOLIC BLOOD PRESSURE: 72 MMHG | HEIGHT: 62 IN | OXYGEN SATURATION: 95 % | SYSTOLIC BLOOD PRESSURE: 118 MMHG | HEART RATE: 65 BPM | BODY MASS INDEX: 19.23 KG/M2 | WEIGHT: 104.5 LBS | TEMPERATURE: 97.9 F

## 2017-09-06 DIAGNOSIS — D84.9 IMMUNOSUPPRESSED STATUS (H): ICD-10-CM

## 2017-09-06 DIAGNOSIS — C34.90 SQUAMOUS CELL LUNG CANCER, UNSPECIFIED LATERALITY (H): ICD-10-CM

## 2017-09-06 DIAGNOSIS — M67.40 GANGLION CYST: ICD-10-CM

## 2017-09-06 DIAGNOSIS — R19.7 DIARRHEA, UNSPECIFIED TYPE: Primary | ICD-10-CM

## 2017-09-06 PROCEDURE — 99214 OFFICE O/P EST MOD 30 MIN: CPT | Performed by: FAMILY MEDICINE

## 2017-09-06 NOTE — NURSING NOTE
"Chief Complaint   Patient presents with     Derm Problem     spot on R inner wrist, lump of some sort, no pain     /72  Pulse 65  Temp 97.9  F (36.6  C) (Oral)  Ht 5' 2\" (1.575 m)  Wt 104 lb 8 oz (47.4 kg)  SpO2 95%  BMI 19.11 kg/m2 Estimated body mass index is 19.11 kg/(m^2) as calculated from the following:    Height as of this encounter: 5' 2\" (1.575 m).    Weight as of this encounter: 104 lb 8 oz (47.4 kg).  Medication Reconciliation: complete      Health Maintenance due pending provider review:  NONE    n/a    Mireya Mays CMA  "
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
## 2017-09-06 NOTE — PATIENT INSTRUCTIONS
Ganglion Cyst: Hand    A ganglion cyst is a firm, fluid-filled lump that can suddenly appear on the front or back of the wrist or at the base of a finger. These cysts grow from normal tissue in the wrist and fingers, and range in size from a pea to a peach pit. Although ganglion cysts are common, they don t spread, and they don t become cancerous. They can occur after an injury, but many times it isn t known why they grow. Ganglion cysts can change in size, and may go away on their own.  Symptoms  A ganglion cyst is sometimes painful, especially when it first occurs. Constantly using your hand or wrist can make the cyst enlarge and hurt more. Some hand and wrist movements, such as grasping things, may also be difficult.  How a ganglion cyst develops  Your wrist and hand are made up of many small bones that meet at joints. Tendons attach muscles to the bones at the joints. The tendons allow the joints to bend and straighten. Both tendons and joints are lined with tissue called synovium. This tissue makes a thick fluid that keeps the joints and tendons moving easily. Sometimes the tissue balloons out from the joint or tendons and forms a cyst. As the cyst fills with fluid and grows, it appears as a lump you can feel.  Where ganglion cysts occur  A ganglion cyst can occur anywhere on the hand near a joint. Cysts most commonly appear on the back or palm side of the wrist, or on the palm at the base of a finger. Your doctor can usually diagnose a cyst by examining the lump. He or she may draw off a little fluid or order an X-ray to rule out other problems.  Treating a ganglion cyst  Your healthcare provider may just watch your ganglion cyst. Many shrink and become painless without treatment. Some disappear altogether. If the cyst is unsightly or painful, or makes it hard for you to use your hand, your healthcare provider can treat it or, if needed, remove it surgically.  Nonsurgical treatment  To shrink the cyst, your  provider may remove (aspirate) the fluid with a needle. If the cyst hurts, your provider may also give you an injection of an anti-inflammatory, such as cortisone, to relieve the irritation. Your hand may then be wrapped to help keep the cyst from recurring.  Surgery  If the cyst reappears after treatment, your healthcare provider may remove it surgically. A section of the tissue that lines the joint or tendon is removed along with the cyst. This helps prevent another cyst from forming, although recurrence of the cyst is still possible after surgery. Usually, only your hand or arm is numbed, and you can go home a few hours after surgery. Your hand may be in a splint for several days.  Date Last Reviewed: 9/10/2015    [id number] [employer]. [address] [place] [address], [place] [address]. All rights reserved. This information is not intended as a substitute for professional medical care. Always follow your healthcare professional's instructions.

## 2017-09-12 DIAGNOSIS — I10 HTN (HYPERTENSION): ICD-10-CM

## 2017-09-12 DIAGNOSIS — Z94.0 KIDNEY REPLACED BY TRANSPLANT: ICD-10-CM

## 2017-09-12 NOTE — TELEPHONE ENCOUNTER
Drug Name: prednisone 5mg  Last Fill Date: 8/17/17  Quantity: 90    Roya Kenny   [facility]  [phone]

## 2017-09-13 ENCOUNTER — MYC MEDICAL ADVICE (OUTPATIENT)
Dept: CARDIOLOGY | Facility: CLINIC | Age: 67
End: 2017-09-13

## 2017-09-13 DIAGNOSIS — I10 HTN (HYPERTENSION): ICD-10-CM

## 2017-09-13 RX ORDER — PREDNISONE 5 MG/1
5 TABLET ORAL DAILY
Qty: 90 TABLET | Refills: 0 | Status: SHIPPED | OUTPATIENT
Start: 2017-09-13 | End: 2017-12-13

## 2017-09-14 RX ORDER — METOPROLOL TARTRATE 50 MG
TABLET ORAL
Qty: 180 TABLET | Refills: 3 | Status: SHIPPED | OUTPATIENT
Start: 2017-09-14 | End: 2018-03-06

## 2017-09-14 RX ORDER — METOPROLOL TARTRATE 50 MG
100 TABLET ORAL 2 TIMES DAILY
Qty: 360 TABLET | Refills: 3 | Status: SHIPPED | OUTPATIENT
Start: 2017-09-14 | End: 2018-08-07

## 2017-10-25 ENCOUNTER — TELEPHONE (OUTPATIENT)
Dept: RADIATION ONCOLOGY | Facility: CLINIC | Age: 67
End: 2017-10-25

## 2017-11-10 ENCOUNTER — HOSPITAL ENCOUNTER (OUTPATIENT)
Dept: CT IMAGING | Facility: CLINIC | Age: 67
Discharge: HOME | End: 2017-11-10
Attending: RADIOLOGY | Admitting: RADIOLOGY

## 2017-11-10 DIAGNOSIS — C34.91 SQUAMOUS CELL CARCINOMA OF RIGHT LUNG (H): ICD-10-CM

## 2017-11-10 PROCEDURE — 71250 CT THORAX DX C-: CPT

## 2017-11-14 ENCOUNTER — OFFICE VISIT (OUTPATIENT)
Dept: RADIATION ONCOLOGY | Facility: CLINIC | Age: 67
End: 2017-11-14
Attending: RADIOLOGY

## 2017-11-14 VITALS
DIASTOLIC BLOOD PRESSURE: 70 MMHG | HEART RATE: 78 BPM | BODY MASS INDEX: 19.2 KG/M2 | WEIGHT: 105 LBS | OXYGEN SATURATION: 98 % | SYSTOLIC BLOOD PRESSURE: 145 MMHG

## 2017-11-14 DIAGNOSIS — C34.11 MALIGNANT NEOPLASM OF UPPER LOBE OF RIGHT LUNG (H): Primary | ICD-10-CM

## 2017-11-14 PROCEDURE — 99212 OFFICE O/P EST SF 10 MIN: CPT | Performed by: RADIOLOGY

## 2017-11-14 ASSESSMENT — PAIN SCALES - GENERAL: PAINLEVEL: NO PAIN (0)

## 2017-11-14 NOTE — LETTER
2017     RE: Maribel Yang  [address]  [facility] [address]     Dear Colleague,    Thank you for referring your patient, Maribel Yang, to the RADIATION ONCOLOGY CLINIC. Please see a copy of my visit note below.    RADIATION ONCOLOGY FOLLOW-UP  DATE OF VISIT: 17     NAME: Maribel Yang   MRN: [id number]  : 1950     DISEASE TREATED: Squamous cell carcinoma of the RUL, stage [id number] (IA)     INTERVAL SINCE RADIOTHERAPY COMPLETION: ~ 3 years and 7 months. Completed 3/24/2014.     TYPE OF RADIOTHERAPY DELIVERED: 5400 cGy, 3 fractions via SBRT, 90% isodose     HISTORY OF PRESENT ILLNESS: Ms. Yang is a 66-year-old female with multiple medical problems who was treated for NSCLC with SBRT. She had a CT chest 2013 which showed RUL 1.5cm lesion that was concerning for malignancy and underwent a CT-guided biopsy of the right upper lobe lesion revealing moderately differentiated squamous cell carcinoma. PET scan 2014 showed her right upper lobe nodule to be hypermetabolic, SUV of 5.5. There was no mediastinal PET activity and no signs of distant disease. The patient was discussed at the Thoracic Tumor Board, and SBRT was recommended as opposed to wedge resection.     Ms. Yang returns for routine routine follow-up. She reports she is in her usual state of health. She denies any chest pain. She does not take anything for it.    PHYSICAL EXAMINATION:  GENERAL: No acute distress. Alert and oriented.      IMAGING: CT chest from 11/10/17 showed stable appearing post-treatment changes in the RUL    RESPONSE:  Pre SBRT ITV= 3.7; GTV 2.8; SBRT MLC edge 15.4  GTV (6 weeks) 1.37  GTV (3 M) 0.45  GTV (6M) 0.32  GTV (9M) 0.37  GTV (12M) 0.23  GTV(15M) 0.14  GTV (18M) 0.12  GTV (21 M) Unable to measure due to scar tissue  GTV (27 M) CR  GTV (31 M) CR  GTV (43 M) CR     IMPRESSION: cNED     RECOMMENDATION: Follow up in 6 months with a chest CT without contrast.      Ms.  Trey was seen and assessed with my staff, Dr. Mckeon.     Kota Glover MD  Radiation Oncology Resident, PGY-3  [facility]  Phone: [phone]    I saw the patient with the resident.  I agree with the resident's note and plan of care.      HERO Mckeon M.D.  Department of Radiation Oncology  [facility]    CC  Patient Care Team:  Lisa Martinez DO as PCP - General (Family Practice)  Mayi, Hitesh Green MD as MD (Nephrology)  Liliana Renteria MD as MD (Oncology)        FOLLOW-UP VISIT    Patient Name: Maribel Yang      : 1950     Age: 66 year old        ______________________________________________________________________________     Chief Complaint   Patient presents with     Cancer     Follow up for Dx Adeno RUL SBRT: RUL 5400 cGy completed 3/24/14     /70  Pulse 78  Wt 47.6 kg (105 lb)  SpO2 98%  BMI 19.2 kg/m2     Date Radiation Completed: 3/24/14    Pain  Denies    Labs  Other Labs: No    Imaging  CT: 11/10/17          Other Appointments:     MD Name:  Appointment Date:    MD Name: Appointment Date:   MD Name: Appointment Date:   Other Appointment Notes:     Residual Radiation side effect: no side effcts     Additional Instructions:     Nurse face-to-face time: Level 2:  5 min face to face time    Again, thank you for allowing me to participate in the care of your patient.      Sincerely,    Nasim Mckeon MD

## 2017-11-14 NOTE — PROGRESS NOTES
FOLLOW-UP VISIT    Patient Name: Maribel Yang      : 1950     Age: 66 year old        ______________________________________________________________________________     Chief Complaint   Patient presents with     Cancer     Follow up for Dx Adeno RUL SBRT: RUL 5400 cGy completed 3/24/14     /70  Pulse 78  Wt 47.6 kg (105 lb)  SpO2 98%  BMI 19.2 kg/m2     Date Radiation Completed: 3/24/14    Pain  Denies    Labs  Other Labs: No    Imaging  CT: 11/10/17          Other Appointments:     MD Name:  Appointment Date:    MD Name: Appointment Date:   MD Name: Appointment Date:   Other Appointment Notes:     Residual Radiation side effect: no side effcts     Additional Instructions:     Nurse face-to-face time: Level 2:  5 min face to face time

## 2017-11-14 NOTE — PROGRESS NOTES
RADIATION ONCOLOGY FOLLOW-UP  DATE OF VISIT: 17     NAME: Maribel Yang   MRN: [id number]  : 1950     DISEASE TREATED: Squamous cell carcinoma of the RUL, stage [id number] (IA)     INTERVAL SINCE RADIOTHERAPY COMPLETION: ~ 3 years and 7 months. Completed 3/24/2014.     TYPE OF RADIOTHERAPY DELIVERED: 5400 cGy, 3 fractions via SBRT, 90% isodose     HISTORY OF PRESENT ILLNESS: Ms. Yang is a 66-year-old female with multiple medical problems who was treated for NSCLC with SBRT. She had a CT chest 2013 which showed RUL 1.5cm lesion that was concerning for malignancy and underwent a CT-guided biopsy of the right upper lobe lesion revealing moderately differentiated squamous cell carcinoma. PET scan 2014 showed her right upper lobe nodule to be hypermetabolic, SUV of 5.5. There was no mediastinal PET activity and no signs of distant disease. The patient was discussed at the Thoracic Tumor Board, and SBRT was recommended as opposed to wedge resection.     Ms. Yang returns for routine routine follow-up. She reports she is in her usual state of health. She denies any chest pain. She does not take anything for it.    PHYSICAL EXAMINATION:  GENERAL: No acute distress. Alert and oriented.      IMAGING: CT chest from 11/10/17 showed stable appearing post-treatment changes in the RUL    RESPONSE:  Pre SBRT ITV= 3.7; GTV 2.8; SBRT MLC edge 15.4  GTV (6 weeks) 1.37  GTV (3 M) 0.45  GTV (6M) 0.32  GTV (9M) 0.37  GTV (12M) 0.23  GTV(15M) 0.14  GTV (18M) 0.12  GTV (21 M) Unable to measure due to scar tissue  GTV (27 M) CR  GTV (31 M) CR  GTV (43 M) CR     IMPRESSION: cNED     RECOMMENDATION: Follow up in 6 months with a chest CT without contrast.      Ms. Yang was seen and assessed with my staff, Dr. Mckeon.     Kota Glover MD  Radiation Oncology Resident, PGY-3  [facility]  Phone: [phone]    I saw the patient with the resident.  I agree with the resident's note  and plan of care.      HERO Mckeon M.D.  Department of Radiation Oncology  [facility]    CC  Patient Care Team:  Lisa Martinez DO as PCP - General (Family Practice)  Hitesh See MD as MD (Nephrology)  Liliana Renteria MD as MD (Oncology)

## 2017-12-13 DIAGNOSIS — Z94.0 KIDNEY REPLACED BY TRANSPLANT: Primary | ICD-10-CM

## 2017-12-13 RX ORDER — PREDNISONE 5 MG/1
5 TABLET ORAL DAILY
Qty: 90 TABLET | Refills: 0 | Status: SHIPPED | OUTPATIENT
Start: 2017-12-13 | End: 2017-12-19

## 2017-12-13 ASSESSMENT — ENCOUNTER SYMPTOMS
LEG PAIN: 1
ORTHOPNEA: 0
BOWEL INCONTINENCE: 1
HEARTBURN: 1
JAUNDICE: 0
LIGHT-HEADEDNESS: 0
DIARRHEA: 1
SMELL DISTURBANCE: 0
SWOLLEN GLANDS: 0
PALPITATIONS: 0
HYPERTENSION: 1
NECK MASS: 0
SINUS CONGESTION: 1
BRUISES/BLEEDS EASILY: 1
TASTE DISTURBANCE: 0
CONSTIPATION: 0
SORE THROAT: 0
BLOATING: 1
BLOOD IN STOOL: 0
ABDOMINAL PAIN: 0
NAUSEA: 0
HYPOTENSION: 0
SYNCOPE: 0
SLEEP DISTURBANCES DUE TO BREATHING: 0
EXERCISE INTOLERANCE: 0
SINUS PAIN: 1
VOMITING: 0
RECTAL PAIN: 0
TROUBLE SWALLOWING: 0
HOARSE VOICE: 0

## 2017-12-14 ENCOUNTER — TELEPHONE (OUTPATIENT)
Dept: TRANSPLANT | Facility: CLINIC | Age: 67
End: 2017-12-14

## 2017-12-14 ENCOUNTER — ONCOLOGY VISIT (OUTPATIENT)
Dept: ONCOLOGY | Facility: CLINIC | Age: 67
End: 2017-12-14
Attending: OBSTETRICS & GYNECOLOGY
Payer: COMMERCIAL

## 2017-12-14 VITALS
BODY MASS INDEX: 19.19 KG/M2 | TEMPERATURE: 97.8 F | HEIGHT: 62 IN | HEART RATE: 74 BPM | SYSTOLIC BLOOD PRESSURE: 147 MMHG | WEIGHT: 104.3 LBS | RESPIRATION RATE: 16 BRPM | DIASTOLIC BLOOD PRESSURE: 81 MMHG | OXYGEN SATURATION: 97 %

## 2017-12-14 DIAGNOSIS — D07.1 VIN III (VULVAR INTRAEPITHELIAL NEOPLASIA III): Primary | ICD-10-CM

## 2017-12-14 DIAGNOSIS — K62.82 ANAL DYSPLASIA: ICD-10-CM

## 2017-12-14 DIAGNOSIS — N89.3 VAGINAL DYSPLASIA: ICD-10-CM

## 2017-12-14 PROCEDURE — 57420 EXAM OF VAGINA W/SCOPE: CPT | Performed by: OBSTETRICS & GYNECOLOGY

## 2017-12-14 PROCEDURE — 99214 OFFICE O/P EST MOD 30 MIN: CPT | Mod: 25 | Performed by: OBSTETRICS & GYNECOLOGY

## 2017-12-14 PROCEDURE — 57421 EXAM/BIOPSY OF VAG W/SCOPE: CPT | Mod: ZF | Performed by: OBSTETRICS & GYNECOLOGY

## 2017-12-14 PROCEDURE — 56821 COLPOSCOPY VULVA W/BIOPSY: CPT

## 2017-12-14 PROCEDURE — 88112 CYTOPATH CELL ENHANCE TECH: CPT | Performed by: OBSTETRICS & GYNECOLOGY

## 2017-12-14 PROCEDURE — 88305 TISSUE EXAM BY PATHOLOGIST: CPT | Performed by: OBSTETRICS & GYNECOLOGY

## 2017-12-14 PROCEDURE — 99212 OFFICE O/P EST SF 10 MIN: CPT | Mod: 25

## 2017-12-14 PROCEDURE — 56821 COLPOSCOPY VULVA W/BIOPSY: CPT | Performed by: OBSTETRICS & GYNECOLOGY

## 2017-12-14 ASSESSMENT — PAIN SCALES - GENERAL: PAINLEVEL: NO PAIN (0)

## 2017-12-14 NOTE — TELEPHONE ENCOUNTER
[facility] Prior Authorization Team   Phone: [phone]  Fax: [phone]    [place] Initiation    Medication: mycophenolic acid 180 MG EC tablet   Insurance Company: [employer] - Phone [phone] Fax [phone]  Pharmacy Filling the Rx: [facility] - [place], [place] - [address]  Filling Pharmacy Phone: [phone]  Filling Pharmacy Fax: [phone]  Start Date: 12/14/2017

## 2017-12-14 NOTE — TELEPHONE ENCOUNTER
[facility] Prior Authorization Team   Phone: [phone]  Fax: [phone]    [place] Initiation    Medication: sirolimus 1 MG tablet   Insurance Company: [employer] - Phone [phone] Fax [phone]  Pharmacy Filling the Rx: [facility] - [place], [place] - [address]  Filling Pharmacy Phone: [phone]  Filling Pharmacy Fax: [phone]  Start Date: 12/14/2017

## 2017-12-14 NOTE — LETTER
"2017     RE: Maribel Yang  [address]  [facility] [address]     Dear Colleague,    Thank you for referring your patient, Maribel Yang, to the [facility]. Please see a copy of my visit note below.    Follow Up Notes on Referred Patient    Date: 2017    Dr. Lisa Martinez, DO  [facility]  [address]  [place] [address]     RE: Maribel Yang  : 1950  JESSEE: 2017    Dear Dr. Lisa Martinez:  Maribel Yang is a 66 year old woman with a diagnosis of  immunosuppression related to kidney transplant and recurrent vaginal, vulvar, cervical, and anal dysplasia. She is here today for repeat pap and colposcopy.  Brief Cancer History:   : + HPV 6 Low risk   10/07: ASCUS, + HPV 56, 54 & 6. : Norman: TAL II   3/11/08: LEEP - TAL II   : ASCUS, ECC atypia, +HPV 82   : Norman - Atypia   : NIL pap, : NIL pap, neg HPV   : LSIL, + HPV 33 or 45, 61.   5/15/13: Norman - VAIN II & III,TAL II. Referred to gyn onc.   13: Norman with gyn onc. Plan LEEP and CO2 laser to vagina, cx and vaginal vault.   13: Colpo in OR, Co2 laser vagina and vulva , YUNIOR 1- 2, AIN 2-3, VAIN 2-3, LEEP, ECC negative.  13: Had colonoscopy with Dr. Musa which was negative. Post coital bleeding noted; uses lubrication..   Pap ASC-H HPV High Risk types HPV 33/45 DNA.  FINAL DIAGNOSIS:  A. Vagina, posterior distal, biopsy: Benign, nondysplastic squamous epithelium with atrophy.  B. Vulva, left clitoral, biopsy: High grade and low grade squamous intraepithelial lesion (vulvar intraepithelial neoplasia 2 and 1) (see comment).  C. Anus, \"external perianal\", biopsy: High grade and low grade squamous intraepithelial lesion (perianal intraepithelial neoplasia 3 and 1) (see comment).  COMMENT: An HSV immunostain is in process for the left clitoral biopsy material, given the presence of multinucleated cells. The tissue findings " correlate with the concurrent anal Pap test, interpreted as high grade squamous intraepithelial lesion (see report [id number]).   Anal Pap: Epithelial Cell Abnormality: Squamous Cell: High-grade squamous intraepithelial lesion (HSIL) encompassing: moderate and severe dysplasia, carcinoma In Situ/ CIN2 and TAL 3.  Referred to Dr Renteria for treatment.  12/23/13: Per Dr. Renteria: Recommended.Colposcopy, CO2 laser vagina and vulva , possible wide local excision for vulvar and anal dysplasia. Will plan for concurrent procedure with colorectal-Dr. Ace. She has not been seen by colorectal since her last surgery.   2/4/14: PETCT IMPRESSION:   1. There is a hypermetabolic right lung 5.5 SUV cavitary RUL 1.2 x 1.2   cm pulmonary nodule, proven malignancy per history and biopsy.   2. There is a 4 mm RUL nodule series 10 image 17, too small to characterize.   3. In the pelvis and abdomen there is no abnormal uptake.   4. There is right calf edema. Clinical correlation suggested.   5. Left pelvic kidney transplant.  Lung biopsy: Right upper lobe lung carcinoma. Pt deferred gyn treatment until after completing treatment for lung cancer.  3/14: Dx Adeno RUL SBRT: RUL 5400 cGy completed 3/24/14   5/8/14: Pt has completed radiation for new diagnosis of lung cancer. Quit smoking! Did not f/u with surgery with Dr. Renteria.or Dr Mccann due to radiation. Here today for surveillance. She notes no vaginal or rectal bleeding, no vulvar irritation, not sexually active due to dyspareunia. Had previously used estrogen cream but was advised not due to her transplant and risk for blood clots. She is ready for treatment for her known high grade anogenitial dysplasia. Colposcopic impression notes vaginal, vulvar and anal high grade dysplasia without obvious invasion though I am concerned of right labia lesion. ASC-US pap, HPV+  7/15/14: Pap: LSIL. HPV screen: Final Diagnosis:  This patient's sample is positive for high risk HPV DNA.  Diagnosis  Comment: This patient's sample is positive for HPV 33 or 45 DNA. The observed banding pattern does not allow us to conclusively differentiate between these two HPV types. The presence of HPV 33 or 45 DNA in lesions of the anogenital tract is considered a high risk factor for the development of malignancy. Close clinical follow-up is recommended.  Guidelines referenced to assign HPV carcinogenicity are from Keshia VIZCARRA, et al. A review of human carcinogens-Part B:biological agents. Lancet Oncol; April 2009;10:321. High risk types: 16, 18, 31, 33, 35, 39, 45, 51, 52, 56, 58, 59, 68   Pathology Biopsies: SPECIMEN(S):  A: Left clitoral biopsy  B: Right anterior perianal skin  FINAL DIAGNOSIS:  A. Vulva, left clitoris, biopsy:  -High grade squamous intraepithelial lesion  -No evidence of invasive malignancy  B: Vulva, right anterior perianal skin, biopsy:  -High grade squamous intraepithelial lesion  -No evidence of invasive malignancy  8/7/2014: Post-op appointment following colposcopy, excisional biopsies, CO2 laser ablation and pap smear.   1) Vulvar colposcopy showed acetowhite changes at the clitoral العراقي that extended down the right labia majora and at the left posterior fourchette.   2) Vaginal colposcopy showed no concerning abnormalities.  3) Left clitoral and right anterior perianal skin (obtained by Dr. Db Carbajal) biopsies showed high grade squamous intraepithelial lesions without invasive malignancy.  4) CO2 laser ablation of tissues with acetowhite changes including the clitoral العراقي, right labia majora, and posterior fourchette.   5) Pap smear: LSIL   6) Positive for high risk HPV DNA  9/23/14: CT Chest: IMPRESSION:  1. Stable spiculated nodule in the right upper lobe compatible with radiotracer squamous cell carcinoma.  2. Moderate to severe centrilobular emphysema.  12/22/14: CT Chest: IMPRESSION:  1. Stable right upper lobe dominant spiculated nodule consistent with squamous cell carcinoma. Additional  stable nodules as above.  2. Small pericardial effusion.  3. Stable left adrenal adenoma.  3/23/15: CT Chest: Impression:  1. Stable postradiation therapy changes to the right upper lobe nodule.  2. No evidence of recurrent disease.  3. End-stage renal disease.  4. Three-vessel coronary artery calcification.  5. Dilatation of the descending thoracic aorta, and an aberrant right subclavian artery.    4/02/15: Vaginal biopsies show VAIN 1 and 2. Pap: ASC-US  FINAL DIAGNOSIS:  Vagina, left suburethral, colposcopic biopsy:  -Low grade and high grade squamous intraepithelial lesion (vaginal  intraepithelial neoplasia 1 and 2)     Patient Name: ERASMO MERINO   MR#: [id number]   Specimen #: [id number]   Collected: 5/26/2016   Received: 5/26/2016   Reported: 5/27/2016 22:05   Ordering Phy(s): GIORGI KWONG     SPECIMEN(S):   Vaginal biopsy, right wall     FINAL DIAGNOSIS:   Vagina, right wall, biopsy:   -Low grade squamous intraepithelial lesion (vaginal intraepithelial   neoplasia 1)   Anal Pap:   Negative for Intraepithelial Lesion or Malignancy   Specimen Adequacy: Satisfactory for evaluation.   -Transformation zone component absent.     Patient Name: ERASMO MERINO   MR#: [id number]   Specimen #: [id number]   Collected: 5/26/2016   Received: 5/27/2016   Reported: 6/6/2016 09:57   Ordering Phy(s): GIORGI KWONG     SPECIMEN/STAIN PROCESS:   Pap imaged thin layer prep screening (Surepath, FocalPoint with guided   screening)        Pap-Cyto x 1, Reflex HPV if NIL/ASCUS/LSIL x 1     SOURCE: Cervical, endocervical   ----------------------------------------------------------------    Pap imaged thin layer prep screening (Surepath, FocalPoint with guided   screening)   SPECIMEN ADEQUACY:   Satisfactory for evaluation.   -Transformation zone component present.     CYTOLOGIC INTERPRETATION:     Epithelial Cell Abnormality:  Glandular Cell:  Atypical-endocervical     6/28/16:   A: Endometrial biopsy   B:  Endocervical curettings     FINAL DIAGNOSIS:   A: Endometrium, biopsy:   -Rare, minute, superficial strips of benign, atrophic endometrial   epithelium   -High grade squamous intraepithelial lesion (cervical intraepithelial   neoplasia 3)   -Cervical transformation zone mucosa present   -Rare detached fragments of unremarkable endocervical tissue   -See comment     B: Endocervix, curettage:   -High grade squamous intraepithelial lesion (cervical intraepithelial   neoplasia 3)   -Cervical transformation zone mucosa not identified   -See comment       8/17/2016  Exam under anesthesia, colposcopy of the vagina, LEEP conization of the cervix, vaginal biopsies.    A: LEEP biopsy   B: Endocervical curettings   C: Vaginal biopsy, apex at 11 o'clock   D: Vaginal biopsy, apex at 4 o'clock   E: Vaginal biopsy, posterior     FINAL DIAGNOSIS:   A: UTERINE CERVIX, LOOP ELECTROSURGICAL EXCISION PROCEDURE (LEEP):   - Low grade squamous intraepithelial lesion (cervical intraepithelial   neoplasia 1)   - Partial denudation of superficial epithelium   - Margins negative for dysplasia     B: ENDOCERVIX, CURETTAGE:   - Cervical epithelium with reactive change     C: VAGINA, APEX AT 11:00 POSITION, BIOPSY:   - Connective tissue with patchy chronic inflammation and scattered foci   of reactive epithelium suggestive of denuded squamous mucosa     D: VAGINA, APEX AT 4:00 POSITION, BIOPSY:   - Squamous mucosa with reactive changes     E: VAGINA, POSTERIOR, BIOPSY:   - Low grade squamous intraepithelial lesion (vaginal intraepithelial   neoplasia 1)         6/1/17: ECC + cervical biopsy, pap + HPV  SPECIMEN(S):   A: Cervical biopsy, 6 o'clock   B: Endocervical curettings     FINAL DIAGNOSIS:   A. CERVIX, 6 O'CLOCK, COLPOSCOPIC BIOPSY:   - Atrophic squamous mucosa   - Transformation zone absent   - No dysplasia or malignancy     B. ENDOCERVIX, CURETTAGE:   - Minute detached fragments of squamous and squamous metaplastic   epithelium   - No  dysplasia or malignancy     Pap: NIL  HPV: other HR HPV positive    Today: Maribel is feeling well today. No vaginal bleeding or abnormal discharge. No vulvar itching, burning or pain. She denies Denies unintended weight loss, weakness, changes in vision or hearing, shortness of breath, cough, chest pain, abdominal pain, dyspepsia, nausea, vomiting, constipation, diarrhea, bloating, dysuria, urinary frequency or urgency or hematuria.       Answers for HPI/ROS submitted by the patient on 12/13/2017   General Symptoms: No  Skin Symptoms: No  HENT Symptoms: Yes  EYE SYMPTOMS: No  HEART SYMPTOMS: Yes  LUNG SYMPTOMS: No  INTESTINAL SYMPTOMS: Yes  URINARY SYMPTOMS: No  GYNECOLOGIC SYMPTOMS: No  BREAST SYMPTOMS: No  SKELETAL SYMPTOMS: No  BLOOD SYMPTOMS: Yes  NERVOUS SYSTEM SYMPTOMS: No  MENTAL HEALTH SYMPTOMS: No  Ear pain: No  Ear discharge: No  Hearing loss: No  Tinnitus: No  Nosebleeds: No  Congestion: Yes  Sinus pain: Yes  Trouble swallowing: No   Voice hoarseness: No  Mouth sores: No  Sore throat: No  Tooth pain: No  Gum tenderness: No  Bleeding gums: No  Change in taste: No  Change in sense of smell: No  Dry mouth: No  Hearing aid used: No  Neck lump: No  Chest pain or pressure: No  Fast or irregular heartbeat: No  Pain in legs with walking: Yes  Trouble breathing while lying down: No  Fingers or toes appear blue: No  High blood pressure: Yes  Low blood pressure: No  Fainting: No  Murmurs: No  Pacemaker: No  Varicose veins: No  Edema or swelling: Yes  Wake up at night with shortness of breath: No  Light-headedness: No  Exercise intolerance: No  Heart burn or indigestion: Yes  Nausea: No  Vomiting: No  Abdominal pain: No  Bloating: Yes  Constipation: No  Diarrhea: Yes  Blood in stool: No  Black stools: No  Rectal or Anal pain: No  Fecal incontinence: Yes  Yellowing of skin or eyes: No  Vomit with blood: No  Change in stools: No  Anemia: No  Swollen glands: No  Easy bleeding or bruising: Yes    I have reviewed and  addressed the patient's review of symptoms for today's visit.     Past Medical History:    Past Medical History:   Diagnosis Date     Abnormal coagulation profile     p [id number] heterozygote      Abnormal Papanicolaou smear of cervix and cervical HPV     Many years ago -- had colposcopies -- normal for years     Anemia      Antiplatelet or antithrombotic long-term use      ASCUS with positive high risk HPV 2007, 2015    + HPV 56, 54,& 6, colp - TAL II, Leep =TAL II     Difficulty in walking(719.7)      High risk medication use      Hypertension      Immunosuppressed status (H)      Kidney replaced by transplant 9/04    Living donor recipient,  Rejection 7/2005     LSIL (low grade squamous intraepithelial lesion) on Pap smear 4/2013    +HPV 33 or 45, 61       Migraine, unspecified, without mention of intractable migraine without mention of status migrainosus      NONSPECIFIC MEDICAL HISTORY     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     Other acute embolism veins      Other specified viral warts 2007    Rectal warts -- HPV type 6 -- low risk     PONV (postoperative nausea and vomiting)      Renal disease      Squamous cell lung cancer (H)      Thrombosis of leg      Unspecified disorder of kidney and ureter     X-linked dominant Alport's syndrome.         Past Surgical History:    Past Surgical History:   Procedure Laterality Date     C NONSPECIFIC PROCEDURE      Thrombectomy     C NONSPECIFIC PROCEDURE  1955 and 1959    Bilater eye surgery - correction for crossed eyes     C NONSPECIFIC PROCEDURE  1998    oopherectomy L     C NONSPECIFIC PROCEDURE  1967    open kidney biopsy - L     C TRANSPLANTATION OF KIDNEY  9/04    recipient -- done at [facility]     COLONOSCOPY       COLONOSCOPY N/A 8/9/2017    Procedure: COMBINED COLONOSCOPY, SINGLE OR MULTIPLE BIOPSY/POLYPECTOMY BY BIOPSY;;  Surgeon: Sushil Hyatt MD;  Location:  GI     COLPOSCOPY,LOOP ELECTRD CERVIX EXCIS   03/11/08    TAL II     CONIZATION LEEP  7/17/2013    Procedure: CONIZATION LEEP;;  Surgeon: Liliana Renteria MD;  Location: [facility] OR     CONIZATION LEEP N/A 8/17/2016    Procedure: CONIZATION LEEP;  Surgeon: Liliana Renteria MD;  Location: [facility] OR     EXAM UNDER ANESTHESIA ANUS  7/15/2014    Procedure: EXAM UNDER ANESTHESIA ANUS;  Surgeon: Radha Musa MD;  Location: [facility] OR     EYE SURGERY       LASER CO2 EXCISE VULVA WIDE LOCAL  7/15/2014    Procedure: LASER CO2 EXCISE VULVA WIDE LOCAL;  Surgeon: Liliana Renteria MD;  Location: [facility] OR     LASER CO2 VAGINA  7/17/2013    Procedure: LASER CO2 VAGINA;;  Surgeon: Liliana Renteria MD;  Location: [facility] OR     MICROSCOPY ANAL  7/17/2013    Procedure: MICROSCOPY ANAL;  Anal Microscopy,  EUA vagina,Colposcopy Of Vagina And Vulva, Vaginal Biopsies, Omniguide Co2 Laser To Vagina and vulva, Loop Electrosurgical Excision Procedure To Cervix;  Surgeon: Radha Musa MD;  Location: [facility] OR     MICROSCOPY ANAL  7/15/2014    Procedure: MICROSCOPY ANAL;  Surgeon: Radha Musa MD;  Location: [facility] OR         Health Maintenance Due   Topic Date Due     HEPATITIS C SCREENING  12/15/1970     INFLUENZA VACCINE (SYSTEM ASSIGNED)  09/01/2017     ADVANCE DIRECTIVE PLANNING Q5 YRS  10/03/2017     PAP Q6 MOS DIAGNOSTIC  12/01/2017     COLPOSCOPY Q6 MOS  12/01/2017       Current Medications:     Current Outpatient Prescriptions   Medication Sig Dispense Refill     predniSONE (DELTASONE) 5 MG tablet Take 1 tablet (5 mg) by mouth daily 90 tablet 0     metoprolol (LOPRESSOR) 50 MG tablet TAKE TWO TABLETS (100MG) BY MOUTH TWICE A  tablet 3     metoprolol (LOPRESSOR) 50 MG tablet Take 2 tablets (100 mg) by mouth 2 times daily 360 tablet 3     losartan (COZAAR) 25 MG tablet TAKE ONE-HALF TABLET (12.5MG) BY MOUTH EVERY DAY 45 tablet 3     clopidogrel (PLAVIX) 75 MG tablet TAKE ONE TABLET BY MOUTH EVERY DAY 90 tablet 1     cilostazol (PLETAL) 100 MG  tablet TAKE ONE TABLET BY MOUTH TWICE A  tablet 1     atorvastatin (LIPITOR) 20 MG tablet TAKE ONE TABLET BY MOUTH EVERY DAY 90 tablet 3     mycophenolic acid (MYFORTIC - GENERIC EQUIVALENT) 180 MG EC tablet Take 1 tablet (180 mg) by mouth 2 times daily 180 tablet 3     sirolimus (RAPAMUNE - GENERIC EQUIVALENT) 1 MG tablet Take 2 tablets (2 mg) by mouth daily 180 tablet 3     NIFEdipine ER osmotic (PROCARDIA XL) 90 MG 24 hr tablet Take 1 tablet (90 mg) by mouth daily 90 tablet 3     lactobacillus rhamnosus, GG, (CULTURELL) capsule Take 1 capsule by mouth 2 times daily 60 capsule 3     Urea 20 % CREA Externally apply topically daily (Patient not taking: Reported on 11/14/2017) 200 g 3     mycophenolic acid (MYFORTIC - GENERIC EQUIVALENT) 180 MG EC tablet Take 1 tablet (180 mg) by mouth 2 times daily 180 tablet 3     amoxicillin (AMOXIL) 500 MG capsule Take 4 capsules (2,000 mg) by mouth once as needed Prior to dental procedures 16 capsule 3     acetaminophen-codeine (TYLENOL/CODEINE #3) 300-30 MG per tablet Take 1-2 tablets by mouth every 6 hours as needed for pain 20 tablet 0     ORDER FOR DME Equipment being ordered: TENS (Patient not taking: Reported on 11/14/2017) 1 Device 0     CALCIUM 500 MG OR TABS 1 tab bid  0         Allergies:        Allergies   Allergen Reactions     Fentanyl Nausea     Hydrocodone Nausea and Vomiting and Hives     Ultracet Nausea and Vomiting and Hives        Social History:     Social History   Substance Use Topics     Smoking status: Former Smoker     Packs/day: 0.30     Years: 35.00     Types: Cigarettes     Quit date: 1/9/2014     Smokeless tobacco: Never Used      Comment: on and off     Alcohol use 0.0 oz/week     0 Standard drinks or equivalent per week      Comment: rare       History   Drug Use No         Family History:       Family History   Problem Relation Age of Onset     DIABETES Father      type 2 diag age,60's     Alcohol/Drug Father      Arthritis Father       "Hypertension Father      Lipids Father      high cholesterol     Arthritis Mother      DIABETES Mother      Depression Mother      HEART DISEASE Mother      Neurologic Disorder Mother      Obesity Mother      Psychotic Disorder Mother      Thyroid Disease Mother      Gynecology Sister      Precancerous cell removal from cervix at age 45     Depression Sister      Allergies Sister      Alcohol/Drug Sister      Neurologic Disorder Sister      CEREBROVASCULAR DISEASE Paternal Grandmother       of a stroke in her 80's     DIABETES Paternal Grandmother      Alcohol/Drug Son      Colon Polyps Sister      Colon Cancer No family hx of      Crohn Disease No family hx of      Ulcerative Colitis No family hx of          Physical Exam:     /81  Pulse 74  Temp 97.8  F (36.6  C) (Oral)  Resp 16  Ht 1.575 m (5' 2.01\")  Wt 47.3 kg (104 lb 4.8 oz)  SpO2 97%  BMI 19.07 kg/m2  Body mass index is 19.07 kg/(m^2).    General Appearance: healthy and alert, no distress     HEENT:  no thyromegaly, no palpable nodules or masses        Cardiovascular: regular rate and rhythm, no gallops, rubs or murmurs     Respiratory: lungs clear, no rales, rhonchi or wheezes, normal diaphragmatic excursion    Musculoskeletal: extremities non tender and without edema    Skin: no lesions or rashes, scar right lower extremity due to previous gangrene infection    Neurological: normal gait, no gross defects     Psychiatric: appropriate mood and affect                               Hematological: normal cervical, supraclavicular and inguinal lymph nodes     Gastrointestinal:       abdomen soft, non-tender, non-distended, no organomegaly or masses, central obesity    Genitourinary: External genitalia and urethral meatus appears normal.  Vagina is smooth and has atrophic changes without nodularity or masses.  Cervix appears small, atrophic, mobile, smooth.  Bimanual exam reveals hemorrhoids, otherwise normal. Recto-vaginal exam confirms these " findings.    Colposcopy of entire vagina, vulva and perianal area done today: Colposcopy was performed of the entire lower genital tract with 5% acetic acid and Lugol's in the vagina. No gross lesions of abnormalities of the vulva identified. ACWE+ near hemorrhoids around keri-anal area, circumferentially. No ACWE along transformation zone or vaginal mucosa; good Lugol's uptake of entire vaginal mucosa. HPV changes noted along top of vagina on right side, atrophic. anal biopsy done - Area infiltrated with 1.5cc lidocaine, anal biopsy done at 7 o'clock. Tissue collected, labeled and sent to pathology. Silver nitrate was applied for hemostasis. Anal Pap done today as well.     Before the procedure, it was ensured that the patient was educated regarding the nature of her findings to date, the implications of them, and what was to be done. The details of the colposcopic procedure were reviewed, as well as the risks of missed diagnoses, pain, infection and bleeding. All questions were answered before proceeding, and informed consent was therefore obtained.      Assessment:    Maribel Yang is a 66 year old woman with a diagnosis of immunosuppression related to kidney transplant and recurrent vaginal, vulvar, cervical, and anal dysplasia. She is here today for repeat pap and colposcopy.     Plan:     1.)    Repeat PAP and Colposcopy in 6 months. Patient can go to colpo clinic for next visit.     2.) Anal pap and anal biopsy at 7 o'clock done today. Will contact patient with results.       Liliana Renteria MD    Department of Ob/Gyn and Women's Health  Division of Gynecologic Oncology  [facility]  [phone]     Total time spent face to face with the patient today was 25 minutes. 15 minutes was spent on the procedure. Greater than 50% of the remaining 10 minutes was spent counseling and or coordinating care as described Juan Carlos Jackson, jim serving as a scribe to  document services personally performed by Liliana Renteria MD, based upon my observations and the provider's statements to me. All documentation has been reviewed by the aforementioned doctor prior to being entered into the official medical record.     I have reviewed the above note and agree with the scribe's notation as written.    Again, thank you for allowing me to participate in the care of your patient.      Sincerely,    MD ALLIE Bhat PAMELA J

## 2017-12-14 NOTE — NURSING NOTE
"Oncology Rooming Note    December 14, 2017 2:17 PM   Maribel Yang is a 66 year old female who presents for:    Chief Complaint   Patient presents with     Oncology Clinic Visit     YUNIOR III (vulvar intraepithelial neoplasia III) F/U, Colposcopy.     Initial Vitals: /81  Pulse 74  Temp 97.8  F (36.6  C) (Oral)  Resp 16  Ht 1.575 m (5' 2.01\")  Wt 47.3 kg (104 lb 4.8 oz)  SpO2 97%  BMI 19.07 kg/m2 Estimated body mass index is 19.07 kg/(m^2) as calculated from the following:    Height as of this encounter: 1.575 m (5' 2.01\").    Weight as of this encounter: 47.3 kg (104 lb 4.8 oz). Body surface area is 1.44 meters squared.  No Pain (0) Comment: Data Unavailable   No LMP recorded. Patient is postmenopausal.  Allergies reviewed: Yes  Medications reviewed: Yes    Medications: Medication refills not needed today.  Pharmacy name entered into [employer]:    [facility] - [place], [place] - [facility] [address]    Clinical concerns: None Dr Renteria was NOT notified.    7 minutes for nursing intake (face to face time)     Gabi Downs LPN              "

## 2017-12-14 NOTE — PROGRESS NOTES
"Follow Up Notes on Referred Patient    Date: 2017    Dr. Lisa Martinez, DO  [facility]  [address] [facility]  [address]  [place] [address]     RE: Maribel Yang  : 1950  JESSEE: 2017    Dear Dr. Lisa Martinez:  Maribel Yang is a 66 year old woman with a diagnosis of  immunosuppression related to kidney transplant and recurrent vaginal, vulvar, cervical, and anal dysplasia. She is here today for repeat pap and colposcopy.  Brief Cancer History:   : + HPV 6 Low risk   10/07: ASCUS, + HPV 56, 54 & 6. : [place]: TAL II   3/11/08: LEEP - TAL II   : ASCUS, ECC atypia, +HPV 82   : [place] - Atypia   : NIL pap, : NIL pap, neg HPV   : LSIL, + HPV 33 or 45, 61.   5/15/13: [place] - VAIN II & III,TAL II. Referred to gyn onc.   13: [place] with gyn onc. Plan LEEP and CO2 laser to vagina, cx and vaginal vault.   13: Colpo in OR, Co2 laser vagina and vulva , YUNIOR 1- 2, AIN 2-3, VAIN 2-3, LEEP, ECC negative.  13: Had colonoscopy with Dr. Musa which was negative. Post coital bleeding noted; uses lubrication..   Pap ASC-H HPV High Risk types HPV 33/45 DNA.  FINAL DIAGNOSIS:  A. Vagina, posterior distal, biopsy: Benign, nondysplastic squamous epithelium with atrophy.  B. Vulva, left clitoral, biopsy: High grade and low grade squamous intraepithelial lesion (vulvar intraepithelial neoplasia 2 and 1) (see comment).  C. Anus, \"external perianal\", biopsy: High grade and low grade squamous intraepithelial lesion (perianal intraepithelial neoplasia 3 and 1) (see comment).  COMMENT: An HSV immunostain is in process for the left clitoral biopsy material, given the presence of multinucleated cells. The tissue findings correlate with the concurrent anal Pap test, interpreted as high grade squamous intraepithelial lesion (see report [id number]).   Anal Pap: Epithelial Cell Abnormality: Squamous Cell: High-grade squamous intraepithelial lesion (HSIL) encompassing: moderate " and severe dysplasia, carcinoma In Situ/ CIN2 and TAL 3.  Referred to Dr Renteria for treatment.  12/23/13: Per Dr. Renteria: Recommended.Colposcopy, CO2 laser vagina and vulva , possible wide local excision for vulvar and anal dysplasia. Will plan for concurrent procedure with colorectal-Dr. Ace. She has not been seen by colorectal since her last surgery.   2/4/14: PETCT IMPRESSION:   1. There is a hypermetabolic right lung 5.5 SUV cavitary RUL 1.2 x 1.2   cm pulmonary nodule, proven malignancy per history and biopsy.   2. There is a 4 mm RUL nodule series 10 image 17, too small to characterize.   3. In the pelvis and abdomen there is no abnormal uptake.   4. There is right calf edema. Clinical correlation suggested.   5. Left pelvic kidney transplant.  Lung biopsy: Right upper lobe lung carcinoma. Pt deferred gyn treatment until after completing treatment for lung cancer.  3/14: Dx [place] RUL SBRT: RUL 5400 cGy completed 3/24/14   5/8/14: Pt has completed radiation for new diagnosis of lung cancer. Quit smoking! Did not f/u with surgery with Dr. Renteria.or Dr Ace-M due to radiation. Here today for surveillance. She notes no vaginal or rectal bleeding, no vulvar irritation, not sexually active due to dyspareunia. Had previously used estrogen cream but was advised not due to her transplant and risk for blood clots. She is ready for treatment for her known high grade anogenitial dysplasia. Colposcopic impression notes vaginal, vulvar and anal high grade dysplasia without obvious invasion though I am concerned of right labia lesion. ASC-US pap, HPV+  7/15/14: Pap: LSIL. HPV screen: Final Diagnosis:  This patient's sample is positive for high risk HPV DNA.  Diagnosis Comment: This patient's sample is positive for HPV 33 or 45 DNA. The observed banding pattern does not allow us to conclusively differentiate between these two HPV types. The presence of HPV 33 or 45 DNA in lesions of the anogenital tract is considered a  high risk factor for the development of malignancy. Close clinical follow-up is recommended.  Guidelines referenced to assign HPV carcinogenicity are from Keshia V, et al. A review of human carcinogens-Part B:biological agents. Lancet Oncol; April 2009;10:321. High risk types: 16, 18, 31, 33, 35, 39, 45, 51, 52, 56, 58, 59, 68   Pathology Biopsies: SPECIMEN(S):  A: Left clitoral biopsy  B: Right anterior perianal skin  FINAL DIAGNOSIS:  A. Vulva, left clitoris, biopsy:  -High grade squamous intraepithelial lesion  -No evidence of invasive malignancy  B: Vulva, right anterior perianal skin, biopsy:  -High grade squamous intraepithelial lesion  -No evidence of invasive malignancy  8/7/2014: Post-op appointment following colposcopy, excisional biopsies, CO2 laser ablation and pap smear.   1) Vulvar colposcopy showed acetowhite changes at the clitoral العراقي that extended down the right labia majora and at the left posterior fourchette.   2) Vaginal colposcopy showed no concerning abnormalities.  3) Left clitoral and right anterior perianal skin (obtained by Dr. Db Carbajal) biopsies showed high grade squamous intraepithelial lesions without invasive malignancy.  4) CO2 laser ablation of tissues with acetowhite changes including the clitoral العراقي, right labia majora, and posterior fourchette.   5) Pap smear: LSIL   6) Positive for high risk HPV DNA  9/23/14: CT Chest: IMPRESSION:  1. Stable spiculated nodule in the right upper lobe compatible with radiotracer squamous cell carcinoma.  2. Moderate to severe centrilobular emphysema.  12/22/14: CT Chest: IMPRESSION:  1. Stable right upper lobe dominant spiculated nodule consistent with squamous cell carcinoma. Additional stable nodules as above.  2. Small pericardial effusion.  3. Stable left adrenal adenoma.  3/23/15: CT Chest: Impression:  1. Stable postradiation therapy changes to the right upper lobe nodule.  2. No evidence of recurrent disease.  3. End-stage renal  disease.  4. Three-vessel coronary artery calcification.  5. Dilatation of the descending thoracic aorta, and an aberrant right subclavian artery.    4/02/15: Vaginal biopsies show VAIN 1 and 2. Pap: ASC-US  FINAL DIAGNOSIS:  Vagina, left suburethral, colposcopic biopsy:  -Low grade and high grade squamous intraepithelial lesion (vaginal  intraepithelial neoplasia 1 and 2)     Patient Name: ERASMO MERINO   MR#: [id number]   Specimen #: [id number]   Collected: 5/26/2016   Received: 5/26/2016   Reported: 5/27/2016 22:05   Ordering Phy(s): GIORGI KWONG     SPECIMEN(S):   Vaginal biopsy, right wall     FINAL DIAGNOSIS:   Vagina, right wall, biopsy:   -Low grade squamous intraepithelial lesion (vaginal intraepithelial   neoplasia 1)   Anal Pap:   Negative for Intraepithelial Lesion or Malignancy   Specimen Adequacy: Satisfactory for evaluation.   -Transformation zone component absent.     Patient Name: ERASMO MERINO   MR#: [id number]   Specimen #: [id number]   Collected: 5/26/2016   Received: 5/27/2016   Reported: 6/6/2016 09:57   Ordering Phy(s): GIORGI KWONG     SPECIMEN/STAIN PROCESS:   Pap imaged thin layer prep screening (Surepath, FocalPoint with guided   screening)        Pap-Cyto x 1, Reflex HPV if NIL/ASCUS/LSIL x 1     SOURCE: Cervical, endocervical   ----------------------------------------------------------------    Pap imaged thin layer prep screening (Surepath, FocalPoint with guided   screening)   SPECIMEN ADEQUACY:   Satisfactory for evaluation.   -Transformation zone component present.     CYTOLOGIC INTERPRETATION:     Epithelial Cell Abnormality:  Glandular Cell:  Atypical-endocervical     6/28/16:   A: Endometrial biopsy   B: Endocervical curettings     FINAL DIAGNOSIS:   A: Endometrium, biopsy:   -Rare, minute, superficial strips of benign, atrophic endometrial   epithelium   -High grade squamous intraepithelial lesion (cervical intraepithelial   neoplasia 3)   -Cervical  transformation zone mucosa present   -Rare detached fragments of unremarkable endocervical tissue   -See comment     B: Endocervix, curettage:   -High grade squamous intraepithelial lesion (cervical intraepithelial   neoplasia 3)   -Cervical transformation zone mucosa not identified   -See comment       8/17/2016  Exam under anesthesia, colposcopy of the vagina, LEEP conization of the cervix, vaginal biopsies.    A: LEEP biopsy   B: Endocervical curettings   C: Vaginal biopsy, apex at 11 o'clock   D: Vaginal biopsy, apex at 4 o'clock   E: Vaginal biopsy, posterior     FINAL DIAGNOSIS:   A: UTERINE CERVIX, LOOP ELECTROSURGICAL EXCISION PROCEDURE (LEEP):   - Low grade squamous intraepithelial lesion (cervical intraepithelial   neoplasia 1)   - Partial denudation of superficial epithelium   - Margins negative for dysplasia     B: ENDOCERVIX, CURETTAGE:   - Cervical epithelium with reactive change     C: VAGINA, APEX AT 11:00 POSITION, BIOPSY:   - Connective tissue with patchy chronic inflammation and scattered foci   of reactive epithelium suggestive of denuded squamous mucosa     D: VAGINA, APEX AT 4:00 POSITION, BIOPSY:   - Squamous mucosa with reactive changes     E: VAGINA, POSTERIOR, BIOPSY:   - Low grade squamous intraepithelial lesion (vaginal intraepithelial   neoplasia 1)         6/1/17: ECC + cervical biopsy, pap + HPV  SPECIMEN(S):   A: Cervical biopsy, 6 o'clock   B: Endocervical curettings     FINAL DIAGNOSIS:   A. CERVIX, 6 O'CLOCK, COLPOSCOPIC BIOPSY:   - Atrophic squamous mucosa   - Transformation zone absent   - No dysplasia or malignancy     B. ENDOCERVIX, CURETTAGE:   - Minute detached fragments of squamous and squamous metaplastic   epithelium   - No dysplasia or malignancy     Pap: NIL  HPV: other HR HPV positive    Today: Maribel is feeling well today. No vaginal bleeding or abnormal discharge. No vulvar itching, burning or pain. She denies Denies unintended weight loss, weakness, changes in vision  or hearing, shortness of breath, cough, chest pain, abdominal pain, dyspepsia, nausea, vomiting, constipation, diarrhea, bloating, dysuria, urinary frequency or urgency or hematuria.       Answers for HPI/ROS submitted by the patient on 12/13/2017   General Symptoms: No  Skin Symptoms: No  HENT Symptoms: Yes  EYE SYMPTOMS: No  HEART SYMPTOMS: Yes  LUNG SYMPTOMS: No  INTESTINAL SYMPTOMS: Yes  URINARY SYMPTOMS: No  GYNECOLOGIC SYMPTOMS: No  BREAST SYMPTOMS: No  SKELETAL SYMPTOMS: No  BLOOD SYMPTOMS: Yes  NERVOUS SYSTEM SYMPTOMS: No  MENTAL HEALTH SYMPTOMS: No  Ear pain: No  Ear discharge: No  Hearing loss: No  Tinnitus: No  Nosebleeds: No  Congestion: Yes  Sinus pain: Yes  Trouble swallowing: No   Voice hoarseness: No  Mouth sores: No  Sore throat: No  Tooth pain: No  Gum tenderness: No  Bleeding gums: No  Change in taste: No  Change in sense of smell: No  Dry mouth: No  Hearing aid used: No  Neck lump: No  Chest pain or pressure: No  Fast or irregular heartbeat: No  Pain in legs with walking: Yes  Trouble breathing while lying down: No  Fingers or toes appear blue: No  High blood pressure: Yes  Low blood pressure: No  Fainting: No  Murmurs: No  Pacemaker: No  Varicose veins: No  Edema or swelling: Yes  Wake up at night with shortness of breath: No  Light-headedness: No  Exercise intolerance: No  Heart burn or indigestion: Yes  Nausea: No  Vomiting: No  Abdominal pain: No  Bloating: Yes  Constipation: No  Diarrhea: Yes  Blood in stool: No  Black stools: No  Rectal or Anal pain: No  Fecal incontinence: Yes  Yellowing of skin or eyes: No  Vomit with blood: No  Change in stools: No  Anemia: No  Swollen glands: No  Easy bleeding or bruising: Yes    I have reviewed and addressed the patient's review of symptoms for today's visit.     Past Medical History:    Past Medical History:   Diagnosis Date     Abnormal coagulation profile     p [id number] heterozygote      Abnormal Papanicolaou smear of cervix and cervical HPV      Many years ago -- had colposcopies -- normal for years     Anemia      Antiplatelet or antithrombotic long-term use      ASCUS with positive high risk HPV 2007, 2015    + HPV 56, 54,& 6, colp - TAL II, [place] =TAL II     Difficulty in walking(719.7)      High risk medication use      Hypertension      Immunosuppressed status (H)      Kidney replaced by transplant 9/04    Living donor recipient,  Rejection 7/2005     LSIL (low grade squamous intraepithelial lesion) on Pap smear 4/2013    +HPV 33 or 45, 61       Migraine, unspecified, without mention of intractable migraine without mention of status migrainosus      NONSPECIFIC MEDICAL HISTORY     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     Other acute embolism veins      Other specified viral warts 2007    Rectal warts -- HPV type 6 -- low risk     PONV (postoperative nausea and vomiting)      Renal disease      Squamous cell lung cancer (H)      Thrombosis of leg      Unspecified disorder of kidney and ureter     X-linked dominant Alport's syndrome.         Past Surgical History:    Past Surgical History:   Procedure Laterality Date     C NONSPECIFIC PROCEDURE      Thrombectomy     C NONSPECIFIC PROCEDURE  1955 and 1959    Bilater eye surgery - correction for crossed eyes     C NONSPECIFIC PROCEDURE  1998    oopherectomy L     C NONSPECIFIC PROCEDURE  1967    open kidney biopsy - L     C TRANSPLANTATION OF KIDNEY  9/04    recipient -- done at [facility]     COLONOSCOPY       COLONOSCOPY N/A 8/9/2017    Procedure: COMBINED COLONOSCOPY, SINGLE OR MULTIPLE BIOPSY/POLYPECTOMY BY BIOPSY;;  Surgeon: Sushil Hyatt MD;  Location:  GI     COLPOSCOPY,LOOP ELECTRD CERVIX EXCIS  03/11/08    TAL II     CONIZATION LEEP  7/17/2013    Procedure: CONIZATION LEEP;;  Surgeon: Liliana Renteria MD;  Location:  OR     CONIZATION LEEP N/A 8/17/2016    Procedure: CONIZATION LEEP;  Surgeon: Liliana Renteria MD;  Location:  OR      EXAM UNDER ANESTHESIA ANUS  7/15/2014    Procedure: EXAM UNDER ANESTHESIA ANUS;  Surgeon: Radha Musa MD;  [facility]     EYE SURGERY       LASER CO2 EXCISE VULVA WIDE LOCAL  7/15/2014    Procedure: LASER CO2 EXCISE VULVA WIDE LOCAL;  Surgeon: Liliana Renteria MD;  [facility] OR     LASER CO2 VAGINA  7/17/2013    Procedure: LASER CO2 VAGINA;;  Surgeon: Liliana Renteria MD;  Location: [facility] OR     MICROSCOPY ANAL  7/17/2013    Procedure: MICROSCOPY ANAL;  Anal Microscopy,  EUA vagina,Colposcopy Of Vagina And Vulva, Vaginal Biopsies, Omniguide Co2 Laser To Vagina and vulva, Loop Electrosurgical Excision Procedure To Cervix;  Surgeon: Radha Musa MD;  Location: [facility] OR     MICROSCOPY ANAL  7/15/2014    Procedure: MICROSCOPY ANAL;  Surgeon: Radha Musa MD;  Location: [facility] OR         Health Maintenance Due   Topic Date Due     HEPATITIS C SCREENING  12/15/1970     INFLUENZA VACCINE (SYSTEM ASSIGNED)  09/01/2017     ADVANCE DIRECTIVE PLANNING Q5 YRS  10/03/2017     PAP Q6 MOS DIAGNOSTIC  12/01/2017     COLPOSCOPY Q6 MOS  12/01/2017       Current Medications:     Current Outpatient Prescriptions   Medication Sig Dispense Refill     predniSONE (DELTASONE) 5 MG tablet Take 1 tablet (5 mg) by mouth daily 90 tablet 0     metoprolol (LOPRESSOR) 50 MG tablet TAKE TWO TABLETS (100MG) BY MOUTH TWICE A  tablet 3     metoprolol (LOPRESSOR) 50 MG tablet Take 2 tablets (100 mg) by mouth 2 times daily 360 tablet 3     losartan (COZAAR) 25 MG tablet TAKE ONE-HALF TABLET (12.5MG) BY MOUTH EVERY DAY 45 tablet 3     clopidogrel (PLAVIX) 75 MG tablet TAKE ONE TABLET BY MOUTH EVERY DAY 90 tablet 1     cilostazol (PLETAL) 100 MG tablet TAKE ONE TABLET BY MOUTH TWICE A  tablet 1     atorvastatin (LIPITOR) 20 MG tablet TAKE ONE TABLET BY MOUTH EVERY DAY 90 tablet 3     mycophenolic acid (MYFORTIC - GENERIC EQUIVALENT) 180 MG EC tablet Take 1 tablet (180 mg) by mouth 2 times  daily 180 tablet 3     sirolimus (RAPAMUNE - GENERIC EQUIVALENT) 1 MG tablet Take 2 tablets (2 mg) by mouth daily 180 tablet 3     NIFEdipine ER osmotic (PROCARDIA XL) 90 MG 24 hr tablet Take 1 tablet (90 mg) by mouth daily 90 tablet 3     lactobacillus rhamnosus, GG, (CULTURELL) capsule Take 1 capsule by mouth 2 times daily 60 capsule 3     Urea 20 % CREA Externally apply topically daily (Patient not taking: Reported on 11/14/2017) 200 g 3     mycophenolic acid (MYFORTIC - GENERIC EQUIVALENT) 180 MG EC tablet Take 1 tablet (180 mg) by mouth 2 times daily 180 tablet 3     amoxicillin (AMOXIL) 500 MG capsule Take 4 capsules (2,000 mg) by mouth once as needed Prior to dental procedures 16 capsule 3     acetaminophen-codeine (TYLENOL/CODEINE #3) 300-30 MG per tablet Take 1-2 tablets by mouth every 6 hours as needed for pain 20 tablet 0     ORDER FOR DME Equipment being ordered: TENS (Patient not taking: Reported on 11/14/2017) 1 Device 0     CALCIUM 500 MG OR TABS 1 tab bid  0         Allergies:        Allergies   Allergen Reactions     Fentanyl Nausea     Hydrocodone Nausea and Vomiting and Hives     Ultracet Nausea and Vomiting and Hives        Social History:     Social History   Substance Use Topics     Smoking status: Former Smoker     Packs/day: 0.30     Years: 35.00     Types: Cigarettes     Quit date: 1/9/2014     Smokeless tobacco: Never Used      Comment: on and off     Alcohol use 0.0 oz/week     0 Standard drinks or equivalent per week      Comment: rare       History   Drug Use No         Family History:       Family History   Problem Relation Age of Onset     DIABETES Father      type 2 diag age,60's     Alcohol/Drug Father      Arthritis Father      Hypertension Father      Lipids Father      high cholesterol     Arthritis Mother      DIABETES Mother      Depression Mother      HEART DISEASE Mother      Neurologic Disorder Mother      Obesity Mother      Psychotic Disorder Mother      Thyroid Disease  "Mother      Gynecology Sister      Precancerous cell removal from cervix at age 45     Depression Sister      Allergies Sister      Alcohol/Drug Sister      Neurologic Disorder Sister      CEREBROVASCULAR DISEASE Paternal Grandmother       of a stroke in her 80's     DIABETES Paternal Grandmother      Alcohol/Drug Son      Colon Polyps Sister      Colon Cancer No family hx of      Crohn Disease No family hx of      Ulcerative Colitis No family hx of          Physical Exam:     /81  Pulse 74  Temp 97.8  F (36.6  C) (Oral)  Resp 16  Ht 1.575 m (5' 2.01\")  Wt 47.3 kg (104 lb 4.8 oz)  SpO2 97%  BMI 19.07 kg/m2  Body mass index is 19.07 kg/(m^2).    General Appearance: healthy and alert, no distress     HEENT:  no thyromegaly, no palpable nodules or masses        Cardiovascular: regular rate and rhythm, no gallops, rubs or murmurs     Respiratory: lungs clear, no rales, rhonchi or wheezes, normal diaphragmatic excursion    Musculoskeletal: extremities non tender and without edema    Skin: no lesions or rashes, scar right lower extremity due to previous gangrene infection    Neurological: normal gait, no gross defects     Psychiatric: appropriate mood and affect                               Hematological: normal cervical, supraclavicular and inguinal lymph nodes     Gastrointestinal:       abdomen soft, non-tender, non-distended, no organomegaly or masses, central obesity    Genitourinary: External genitalia and urethral meatus appears normal.  Vagina is smooth and has atrophic changes without nodularity or masses.  Cervix appears small, atrophic, mobile, smooth.  Bimanual exam reveals hemorrhoids, otherwise normal. Recto-vaginal exam confirms these findings.    Colposcopy of entire vagina, vulva and perianal area done today: Colposcopy was performed of the entire lower genital tract with 5% acetic acid and Lugol's in the vagina. No gross lesions of abnormalities of the vulva identified. ACWE+ near " hemorrhoids around keri-anal area, circumferentially. No ACWE along transformation zone or vaginal mucosa; good Lugol's uptake of entire vaginal mucosa. HPV changes noted along top of vagina on right side, atrophic. anal biopsy done - Area infiltrated with 1.5cc lidocaine, anal biopsy done at 7 o'clock. Tissue collected, labeled and sent to pathology. Silver nitrate was applied for hemostasis. Anal Pap done today as well.     Before the procedure, it was ensured that the patient was educated regarding the nature of her findings to date, the implications of them, and what was to be done. The details of the colposcopic procedure were reviewed, as well as the risks of missed diagnoses, pain, infection and bleeding. All questions were answered before proceeding, and informed consent was therefore obtained.      Assessment:    Maribel Yang is a 66 year old woman with a diagnosis of immunosuppression related to kidney transplant and recurrent vaginal, vulvar, cervical, and anal dysplasia. She is here today for repeat pap and colposcopy.     Plan:     1.)    Repeat PAP and Colposcopy in 6 months. Patient can go to colpo clinic for next visit.     2.) Anal pap and anal biopsy at 7 o'clock done today. Will contact patient with results.       Liliana Renteria MD    Department of Ob/Gyn and Women's Health  Division of Gynecologic Oncology  [facility]  [phone]     Total time spent face to face with the patient today was 25 minutes. 15 minutes was spent on the procedure. Greater than 50% of the remaining 10 minutes was spent counseling and or coordinating care as described beckie GARCIA, JOSEPH VARGHESE, Juan Carlos Blair, am serving as a scribe to document services personally performed by Liliana Renteria MD, based upon my observations and the provider's statements to me. All documentation has been reviewed by the aforementioned doctor prior to being entered into the official  medical record.     I have reviewed the above note and agree with the scribe's notation as written.

## 2017-12-14 NOTE — TELEPHONE ENCOUNTER
[facility] Prior Authorization Team   Phone: [phone]  Fax: [phone]    Prior Authorization Approval    Authorization Effective Date: 11/14/2017  Authorization Expiration Date: 12/30/2099  Medication: mycophenolic acid 180 MG EC tablet   Approved Dose/Quantity: Take 1 tablet (180 mg) by mouth 2 times daily / #60  Reference #: CMM KEY#: [id number]   Insurance Company: [employer] - Phone [phone] Fax [phone]  Expected CoPay: $12.00     CoPay Card Available:      Foundation Assistance Needed:    Which Pharmacy is filling the prescription (Not needed for infusion/clinic administered): [facility] - [place], [place] - [facility] [address]  Pharmacy Notified: Yes  Patient Notified: Yes    Approval letter not available at this time.
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
## 2017-12-18 LAB — COPATH REPORT: NORMAL

## 2017-12-19 DIAGNOSIS — Z94.0 KIDNEY REPLACED BY TRANSPLANT: ICD-10-CM

## 2017-12-20 RX ORDER — PREDNISONE 5 MG/1
5 TABLET ORAL DAILY
Qty: 90 TABLET | Refills: 3 | Status: SHIPPED | OUTPATIENT
Start: 2017-12-20 | End: 2019-03-20

## 2017-12-22 LAB — COPATH REPORT: NORMAL

## 2018-01-19 ENCOUNTER — TELEPHONE (OUTPATIENT)
Dept: SURGERY | Facility: CLINIC | Age: 68
End: 2018-01-19

## 2018-01-19 NOTE — TELEPHONE ENCOUNTER
Left message for pt regarding upcoming appt on Monday 1/22/18 with Vianney Worthy NP-C explained that pt has been incorrectly scheduled for regular clinic visit and needs to be rescheduled for more specialized procedure. Explained that appt will be cancelled and provided contact information to reschedule. Will await call back. Chandni PENG LPN

## 2018-01-19 NOTE — TELEPHONE ENCOUNTER
APPT INFO    Date /Time: 1/22/18   Reason for Appt: Anal Dysplasia   Ref Provider/Clinic: [facility]   Are there internal records? Yes/No?  IF YES, list clinic names: Yes  See Above   Are there outside records? Yes/No? No    Patient Contact (Y/N) & Call Details: No referred   Action: Reviewed records; Records are in EPIC     OUTSIDE RECORDS CHECKLIST     CLINIC NAME COMMENTS REC (x) IMG (x)

## 2018-01-22 ENCOUNTER — PRE VISIT (OUTPATIENT)
Dept: SURGERY | Facility: CLINIC | Age: 68
End: 2018-01-22

## 2018-02-15 ENCOUNTER — OFFICE VISIT (OUTPATIENT)
Dept: SURGERY | Facility: CLINIC | Age: 68
End: 2018-02-15
Payer: COMMERCIAL

## 2018-02-15 VITALS
HEIGHT: 62 IN | HEART RATE: 67 BPM | TEMPERATURE: 98.4 F | WEIGHT: 105.6 LBS | OXYGEN SATURATION: 97 % | SYSTOLIC BLOOD PRESSURE: 142 MMHG | BODY MASS INDEX: 19.43 KG/M2 | DIASTOLIC BLOOD PRESSURE: 68 MMHG

## 2018-02-15 DIAGNOSIS — K62.82 ANAL DYSPLASIA: Primary | ICD-10-CM

## 2018-02-15 DIAGNOSIS — I73.9 PERIPHERAL ARTERY DISEASE (H): ICD-10-CM

## 2018-02-15 ASSESSMENT — PAIN SCALES - GENERAL: PAINLEVEL: NO PAIN (0)

## 2018-02-15 NOTE — LETTER
2/15/2018     RE: Maribel Yang  [address]  [facility] [address]     Dear Colleague,    Thank you for referring your patient, Maribel Yang, to the [facility] COLON AND RECTAL SURGERY at [facility]. Please see a copy of my visit note below.      Colon and Rectal Surgery Clinic High Resolution Anoscopy Note    RE: Maribel Yang  : 1950  JESSEE: 2/15/2018    Maribel Yang is a 67 year old female with a significant past medical history of kidney transplant in , lung cancer in , cervical dysplasia, ananorectal condyloma and vulvar intraepithelial neoplasia 2 who presents today for high resolution anoscopy. She was seen in  by Dr. 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 She was following with Rosanna Najera NP in gynecology for subsequent HRA.  She was seen in Dec of 2017 by Dr. Renteria with perianal biopsy that showed AIN 3. Anal pap showed ASCUS.     HPI: Maribel admits to occasional cigarette use. Last colonoscopy was in 2017 for diarrhea with hyperplastic polyp and biopsies taken without cause for diarrhea identified. Pt does report some abdominal cramps with chronic diarrhea. Does also reports some external hemorrhoids. She denies any rectal bleeding or rectal pain. Reports she did have some hemorrhoids removed 4 year ago.     ASSESSMENT: Written, informed consent was obtained prior to procedure.  Prior to the start of the procedure and with procedural staff participation, I verbally confirmed the patient s identity using two indicators, relevant allergies, that the procedure was appropriate and matched the consent or emergent situation, and that the correct equipment/implants were available. Immediately prior to starting the procedure I conducted the Time Out with the procedural staff and re-confirmed the patient s name, procedure, and site/side. (The Joint Commission universal protocol was  followed.)  Yes    Sedation (Moderate or Deep): None    Anal cytology was obtained with Dacron swab. Digital anal rectal exam was performed with firm lesion palpable in the left lateral position approximately 2-3 cm from the anal verge. Dilute acetic acid soak was completed for 2 minutes. Lubricant was used to insert the anoscope. Performed high resolution microscopy using the colposcope. The dentate line was viewed in its entirety. Abnormal areas noted were a small lesion with some surface erosion in the left lateral position just above the dentate line. After injection with 1% lidocaine with epinephrine, biopsy was obtained at this site suing a baby Tischler forceps. Hemostasis was obtained using Monsel solution. The remainder of the anal canal with internal hemorrhoids but no bright acetowhitening or punctation.   The perianal area was inspected after acetic acid soak. The findings noted were multiple small skin tags present. There was a slightly firm skin tag in the left anterior position with acetowhitening and punctation. After injection with 1% lidocaine with epinephrine, biopsy was obtained at this site using a baby Tischler forceps and hemostasis was obtained using Monsel solution.   The patient tolerated the procedures well.    PLAN: Will follow up with patient with results of biopsies from today. If biopsy inside the anal canal is negative, I am still concerned about this lesion and would recommend an EUA with additional biopsy. Patient's questions were answered to her stated satisfaction and she is in agreement with this plan.    For details of past medical history, surgical history, family history, medications, allergies, and review of systems, please see details below.    Medical history:  Past Medical History:   Diagnosis Date     Abnormal coagulation profile     p [id number] heterozygote      Abnormal Papanicolaou smear of cervix and cervical HPV     Many years ago -- had colposcopies -- normal for  years     Anemia      Antiplatelet or antithrombotic long-term use      ASCUS with positive high risk HPV 2007, 2015    + HPV 56, 54,& 6, colp - TAL II, Leep =TAL II     Difficulty in walking(719.7)      High risk medication use      Hypertension      Immunosuppressed status (H)      Kidney replaced by transplant 9/04    Living donor recipient,  Rejection 7/2005     LSIL (low grade squamous intraepithelial lesion) on Pap smear 4/2013    +HPV 33 or 45, 61       Migraine, unspecified, without mention of intractable migraine without mention of status migrainosus      NONSPECIFIC MEDICAL HISTORY     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     Other acute embolism veins      Other specified viral warts 2007    Rectal warts -- HPV type 6 -- low risk     PONV (postoperative nausea and vomiting)      Renal disease      Squamous cell lung cancer (H)      Thrombosis of leg      Unspecified disorder of kidney and ureter     X-linked dominant Alport's syndrome.       Surgical history:  Past Surgical History:   Procedure Laterality Date     C NONSPECIFIC PROCEDURE      Thrombectomy     C NONSPECIFIC PROCEDURE  1955 and 1959    Bilater eye surgery - correction for crossed eyes     C NONSPECIFIC PROCEDURE  1998    oopherectomy L     C NONSPECIFIC PROCEDURE  1967    open kidney biopsy - L     C TRANSPLANTATION OF KIDNEY  9/04    recipient -- [facility]     COLONOSCOPY       COLONOSCOPY N/A 8/9/2017    Procedure: COMBINED COLONOSCOPY, SINGLE OR MULTIPLE BIOPSY/POLYPECTOMY BY BIOPSY;;  Surgeon: Sushil Hyatt MD;  [facility]     COLPOSCOPY,LOOP ELECTRD CERVIX EXCIS  03/11/08    TAL II     CONIZATION LEEP  7/17/2013    Procedure: CONIZATION LEEP;;  Surgeon: Liliana Renteria MD;  Location: [facility] OR     CONIZATION LEEP N/A 8/17/2016    Procedure: CONIZATION LEEP;  Surgeon: Liliana Renteria MD;  Location: [facility] OR     EXAM UNDER ANESTHESIA ANUS  7/15/2014    Procedure: EXAM  UNDER ANESTHESIA ANUS;  Surgeon: Radha Musa MD;  Location: [facility] OR     EYE SURGERY       LASER CO2 EXCISE VULVA WIDE LOCAL  7/15/2014    Procedure: LASER CO2 EXCISE VULVA WIDE LOCAL;  Surgeon: Liliana Renteria MD;  [facility]     LASER CO2 VAGINA  2013    Procedure: LASER CO2 VAGINA;;  Surgeon: Liliana Renteria MD;  [facility]     MICROSCOPY ANAL  2013    Procedure: MICROSCOPY ANAL;  Anal Microscopy,  EUA vagina,Colposcopy Of Vagina And Vulva, Vaginal Biopsies, Omniguide Co2 Laser To Vagina and vulva, Loop Electrosurgical Excision Procedure To Cervix;  Surgeon: Radha Musa MD;  Location: [facility]     MICROSCOPY ANAL  7/15/2014    Procedure: MICROSCOPY ANAL;  Surgeon: Radha Musa MD;  Location: [facility] OR       Family history:  Family History   Problem Relation Age of Onset     DIABETES Father      type 2 diag age,60's     Alcohol/Drug Father      Arthritis Father      Hypertension Father      Lipids Father      high cholesterol     Arthritis Mother      DIABETES Mother      Depression Mother      HEART DISEASE Mother      Neurologic Disorder Mother      Obesity Mother      Psychotic Disorder Mother      Thyroid Disease Mother      Gynecology Sister      Precancerous cell removal from cervix at age 45     Depression Sister      Allergies Sister      Alcohol/Drug Sister      Neurologic Disorder Sister      CEREBROVASCULAR DISEASE Paternal Grandmother       of a stroke in her 80's     DIABETES Paternal Grandmother      Alcohol/Drug Son      Colon Polyps Sister      Colon Cancer No family hx of      Crohn Disease No family hx of      Ulcerative Colitis No family hx of        Medications:  Current Outpatient Prescriptions   Medication Sig Dispense Refill     predniSONE (DELTASONE) 5 MG tablet Take 1 tablet (5 mg) by mouth daily 90 tablet 3     metoprolol (LOPRESSOR) 50 MG tablet TAKE TWO TABLETS (100MG) BY MOUTH TWICE A  tablet 3     metoprolol  (LOPRESSOR) 50 MG tablet Take 2 tablets (100 mg) by mouth 2 times daily 360 tablet 3     losartan (COZAAR) 25 MG tablet TAKE ONE-HALF TABLET (12.5MG) BY MOUTH EVERY DAY 45 tablet 3     clopidogrel (PLAVIX) 75 MG tablet TAKE ONE TABLET BY MOUTH EVERY DAY 90 tablet 1     cilostazol (PLETAL) 100 MG tablet TAKE ONE TABLET BY MOUTH TWICE A  tablet 1     atorvastatin (LIPITOR) 20 MG tablet TAKE ONE TABLET BY MOUTH EVERY DAY 90 tablet 3     mycophenolic acid (MYFORTIC - GENERIC EQUIVALENT) 180 MG EC tablet Take 1 tablet (180 mg) by mouth 2 times daily 180 tablet 3     sirolimus (RAPAMUNE - GENERIC EQUIVALENT) 1 MG tablet Take 2 tablets (2 mg) by mouth daily 180 tablet 3     NIFEdipine ER osmotic (PROCARDIA XL) 90 MG 24 hr tablet Take 1 tablet (90 mg) by mouth daily 90 tablet 3     lactobacillus rhamnosus, GG, (CULTURELL) capsule Take 1 capsule by mouth 2 times daily 60 capsule 3     Urea 20 % CREA Externally apply topically daily 200 g 3     mycophenolic acid (MYFORTIC - GENERIC EQUIVALENT) 180 MG EC tablet Take 1 tablet (180 mg) by mouth 2 times daily 180 tablet 3     amoxicillin (AMOXIL) 500 MG capsule Take 4 capsules (2,000 mg) by mouth once as needed Prior to dental procedures 16 capsule 3     acetaminophen-codeine (TYLENOL/CODEINE #3) 300-30 MG per tablet Take 1-2 tablets by mouth every 6 hours as needed for pain 20 tablet 0     ORDER FOR DME Equipment being ordered: TENS 1 Device 0     CALCIUM 500 MG OR TABS 1 tab bid  0     Allergies:  The patientis allergic to fentanyl; hydrocodone; and ultracet.    Social history:  Social History   Substance Use Topics     Smoking status: Former Smoker     Packs/day: 0.30     Years: 35.00     Types: Cigarettes     Quit date: 1/9/2014     Smokeless tobacco: Never Used      Comment: on and off     Alcohol use 0.0 oz/week     0 Standard drinks or equivalent per week      Comment: rare     Marital status: .    Review of Systems:  Nursing Notes:   Chandni Keenan LPN   "2/15/2018  1:04 PM  Signed  Chief Complaint   Patient presents with     Clinic Care Coordination - Initial     HRA       Vitals:    02/15/18 1301   BP: 142/68   Pulse: 67   Temp: 98.4  F (36.9  C)   TempSrc: Oral   SpO2: 97%   Weight: 105 lb 9.6 oz   Height: 5' 2\"       Body mass index is 19.31 kg/(m^2).  Chandni PENG LPN                           This procedure was performed under a collaborative agreement with Dr. Shabbir Leo MD, Chief of Colon and Rectal Surgery, [facility].    Vianney Worthy, NP-C  Colon and Rectal Surgery  [facility]    This note was created using speech recognition software and may contain unintended word substitutions.    "

## 2018-02-15 NOTE — NURSING NOTE
"Chief Complaint   Patient presents with     Clinic Care Coordination - Initial     HRA       Vitals:    02/15/18 1301   BP: 142/68   Pulse: 67   Temp: 98.4  F (36.9  C)   TempSrc: Oral   SpO2: 97%   Weight: 105 lb 9.6 oz   Height: 5' 2\"       Body mass index is 19.31 kg/(m^2).  Chandni PENG LPN                        "
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
## 2018-02-15 NOTE — PROGRESS NOTES
Colon and Rectal Surgery Clinic High Resolution Anoscopy Note    RE: Maribel Yang  : 1950  JESSEE: 2/15/2018    Maribel Yang is a 67 year old female with a significant past medical history of kidney transplant in , lung cancer in , cervical dysplasia, ananorectal condyloma and vulvar intraepithelial neoplasia 2 who presents today for high resolution anoscopy. She was seen in  by Dr. 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 She was following with Rosanna Najera NP in gynecology for subsequent HRA.  She was seen in Dec of 2017 by Dr. Renteria with perianal biopsy that showed AIN 3. Anal pap showed ASCUS.     HPI: Maribel admits to occasional cigarette use. Last colonoscopy was in 2017 for diarrhea with hyperplastic polyp and biopsies taken without cause for diarrhea identified. Pt does report some abdominal cramps with chronic diarrhea. Does also reports some external hemorrhoids. She denies any rectal bleeding or rectal pain. Reports she did have some hemorrhoids removed 4 year ago.     ASSESSMENT: Written, informed consent was obtained prior to procedure.  Prior to the start of the procedure and with procedural staff participation, I verbally confirmed the patient s identity using two indicators, relevant allergies, that the procedure was appropriate and matched the consent or emergent situation, and that the correct equipment/implants were available. Immediately prior to starting the procedure I conducted the Time Out with the procedural staff and re-confirmed the patient s name, procedure, and site/side. (The Joint Commission universal protocol was followed.)  Yes    Sedation (Moderate or Deep): None    Anal cytology was obtained with Dacron swab. Digital anal rectal exam was performed with firm lesion palpable in the left lateral position approximately 2-3 cm from the anal verge. Dilute acetic acid soak was completed for 2 minutes. Lubricant was used to  insert the anoscope. Performed high resolution microscopy using the colposcope. The dentate line was viewed in its entirety. Abnormal areas noted were a small lesion with some surface erosion in the left lateral position just above the dentate line. After injection with 1% lidocaine with epinephrine, biopsy was obtained at this site suing a baby Tischler forceps. Hemostasis was obtained using Monsel solution. The remainder of the anal canal with internal hemorrhoids but no bright acetowhitening or punctation.   The perianal area was inspected after acetic acid soak. The findings noted were multiple small skin tags present. There was a slightly firm skin tag in the left anterior position with acetowhitening and punctation. After injection with 1% lidocaine with epinephrine, biopsy was obtained at this site using a baby Tischler forceps and hemostasis was obtained using Monsel solution.   The patient tolerated the procedures well.    PLAN: Will follow up with patient with results of biopsies from today. If biopsy inside the anal canal is negative, I am still concerned about this lesion and would recommend an EUA with additional biopsy. Patient's questions were answered to her stated satisfaction and she is in agreement with this plan.    For details of past medical history, surgical history, family history, medications, allergies, and review of systems, please see details below.    Medical history:  Past Medical History:   Diagnosis Date     Abnormal coagulation profile     p [id number] heterozygote      Abnormal Papanicolaou smear of cervix and cervical HPV     Many years ago -- had colposcopies -- normal for years     Anemia      Antiplatelet or antithrombotic long-term use      ASCUS with positive high risk HPV 2007, 2015    + HPV 56, 54,& 6, colp - TAL II, Leep =TAL II     Difficulty in walking(719.7)      High risk medication use      Hypertension      Immunosuppressed status (H)      Kidney replaced by transplant  9/04    Living donor recipient,  Rejection 7/2005     LSIL (low grade squamous intraepithelial lesion) on Pap smear 4/2013    +HPV 33 or 45, 61       Migraine, unspecified, without mention of intractable migraine without mention of status migrainosus      NONSPECIFIC MEDICAL HISTORY     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     Other acute embolism veins      Other specified viral warts 2007    Rectal warts -- HPV type 6 -- low risk     PONV (postoperative nausea and vomiting)      Renal disease      Squamous cell lung cancer (H)      Thrombosis of leg      Unspecified disorder of kidney and ureter     X-linked dominant Alport's syndrome.       Surgical history:  Past Surgical History:   Procedure Laterality Date     C NONSPECIFIC PROCEDURE      Thrombectomy     C NONSPECIFIC PROCEDURE  1955 and 1959    Bilater eye surgery - correction for crossed eyes     C NONSPECIFIC PROCEDURE  1998    oopherectomy L     C NONSPECIFIC PROCEDURE  1967    open kidney biopsy - L     C TRANSPLANTATION OF KIDNEY  9/04    recipient -- done [facility]     COLONOSCOPY       COLONOSCOPY N/A 8/9/2017    Procedure: COMBINED COLONOSCOPY, SINGLE OR MULTIPLE BIOPSY/POLYPECTOMY BY BIOPSY;;  Surgeon: Sushil Hyatt MD;  Location:  GI     COLPOSCOPY,LOOP ELECTRD CERVIX EXCIS  03/11/08    TAL II     CONIZATION LEEP  7/17/2013    Procedure: CONIZATION LEEP;;  Surgeon: Liliana Renteria MD;  Location:  OR     CONIZATION LEEP N/A 8/17/2016    Procedure: CONIZATION LEEP;  Surgeon: Liliana Renteria MD;  Location:  OR     EXAM UNDER ANESTHESIA ANUS  7/15/2014    Procedure: EXAM UNDER ANESTHESIA ANUS;  Surgeon: Radha Musa MD;  Location:  OR     EYE SURGERY       LASER CO2 EXCISE VULVA WIDE LOCAL  7/15/2014    Procedure: LASER CO2 EXCISE VULVA WIDE LOCAL;  Surgeon: Liliana Renteria MD;  Location:  OR     LASER CO2 VAGINA  7/17/2013    Procedure: LASER CO2 VAGINA;;   Surgeon: Liliana Renteria MD;  [facility]     MICROSCOPY ANAL  2013    Procedure: MICROSCOPY ANAL;  Anal Microscopy,  EUA vagina,Colposcopy Of Vagina And Vulva, Vaginal Biopsies, Omniguide Co2 Laser To Vagina and vulva, Loop Electrosurgical Excision Procedure To Cervix;  Surgeon: Radha Musa MD;  Location: [facility] OR     MICROSCOPY ANAL  7/15/2014    Procedure: MICROSCOPY ANAL;  Surgeon: Radha Musa MD;  Location: [facility] OR       Family history:  Family History   Problem Relation Age of Onset     DIABETES Father      type 2 diag age,60's     Alcohol/Drug Father      Arthritis Father      Hypertension Father      Lipids Father      high cholesterol     Arthritis Mother      DIABETES Mother      Depression Mother      HEART DISEASE Mother      Neurologic Disorder Mother      Obesity Mother      Psychotic Disorder Mother      Thyroid Disease Mother      Gynecology Sister      Precancerous cell removal from cervix at age 45     Depression Sister      Allergies Sister      Alcohol/Drug Sister      Neurologic Disorder Sister      CEREBROVASCULAR DISEASE Paternal Grandmother       of a stroke in her 80's     DIABETES Paternal Grandmother      Alcohol/Drug Son      Colon Polyps Sister      Colon Cancer No family hx of      Crohn Disease No family hx of      Ulcerative Colitis No family hx of        Medications:  Current Outpatient Prescriptions   Medication Sig Dispense Refill     predniSONE (DELTASONE) 5 MG tablet Take 1 tablet (5 mg) by mouth daily 90 tablet 3     metoprolol (LOPRESSOR) 50 MG tablet TAKE TWO TABLETS (100MG) BY MOUTH TWICE A  tablet 3     metoprolol (LOPRESSOR) 50 MG tablet Take 2 tablets (100 mg) by mouth 2 times daily 360 tablet 3     losartan (COZAAR) 25 MG tablet TAKE ONE-HALF TABLET (12.5MG) BY MOUTH EVERY DAY 45 tablet 3     clopidogrel (PLAVIX) 75 MG tablet TAKE ONE TABLET BY MOUTH EVERY DAY 90 tablet 1     cilostazol (PLETAL) 100 MG tablet TAKE ONE  "TABLET BY MOUTH TWICE A  tablet 1     atorvastatin (LIPITOR) 20 MG tablet TAKE ONE TABLET BY MOUTH EVERY DAY 90 tablet 3     mycophenolic acid (MYFORTIC - GENERIC EQUIVALENT) 180 MG EC tablet Take 1 tablet (180 mg) by mouth 2 times daily 180 tablet 3     sirolimus (RAPAMUNE - GENERIC EQUIVALENT) 1 MG tablet Take 2 tablets (2 mg) by mouth daily 180 tablet 3     NIFEdipine ER osmotic (PROCARDIA XL) 90 MG 24 hr tablet Take 1 tablet (90 mg) by mouth daily 90 tablet 3     lactobacillus rhamnosus, GG, (CULTURELL) capsule Take 1 capsule by mouth 2 times daily 60 capsule 3     Urea 20 % CREA Externally apply topically daily 200 g 3     mycophenolic acid (MYFORTIC - GENERIC EQUIVALENT) 180 MG EC tablet Take 1 tablet (180 mg) by mouth 2 times daily 180 tablet 3     amoxicillin (AMOXIL) 500 MG capsule Take 4 capsules (2,000 mg) by mouth once as needed Prior to dental procedures 16 capsule 3     acetaminophen-codeine (TYLENOL/CODEINE #3) 300-30 MG per tablet Take 1-2 tablets by mouth every 6 hours as needed for pain 20 tablet 0     ORDER FOR DME Equipment being ordered: TENS 1 Device 0     CALCIUM 500 MG OR TABS 1 tab bid  0     Allergies:  The patientis allergic to fentanyl; hydrocodone; and ultracet.    Social history:  Social History   Substance Use Topics     Smoking status: Former Smoker     Packs/day: 0.30     Years: 35.00     Types: Cigarettes     Quit date: 1/9/2014     Smokeless tobacco: Never Used      Comment: on and off     Alcohol use 0.0 oz/week     0 Standard drinks or equivalent per week      Comment: rare     Marital status: .    Review of Systems:  Nursing Notes:   Chandni Keenan LPN  2/15/2018  1:04 PM  Signed  Chief Complaint   Patient presents with     Clinic Care Coordination - Initial     HRA       Vitals:    02/15/18 1301   BP: 142/68   Pulse: 67   Temp: 98.4  F (36.9  C)   TempSrc: Oral   SpO2: 97%   Weight: 105 lb 9.6 oz   Height: 5' 2\"       Body mass index is 19.31 kg/(m^2).  Chandni PENG" SETH                           This procedure was performed under a collaborative agreement with Dr. Shabbir Leo MD, Chief of Colon and Rectal Surgery, [facility].    Vianney Worthy NP-C  Colon and Rectal Surgery  [facility]      This note was created using speech recognition software and may contain unintended word substitutions.

## 2018-02-16 LAB — COPATH REPORT: NORMAL

## 2018-02-16 RX ORDER — CLOPIDOGREL BISULFATE 75 MG/1
TABLET ORAL
Qty: 30 TABLET | Refills: 0 | Status: SHIPPED | OUTPATIENT
Start: 2018-02-16 | End: 2018-07-20

## 2018-02-16 RX ORDER — CILOSTAZOL 100 MG/1
TABLET ORAL
Qty: 60 TABLET | Refills: 0 | Status: SHIPPED | OUTPATIENT
Start: 2018-02-16 | End: 2018-07-20

## 2018-02-16 NOTE — TELEPHONE ENCOUNTER
"Plavix:  Routing refill request to provider for review/approval because:  Labs out of range:  Needs yearly Hgb and Platelets    Pletal:  Routing refill request to provider for review/approval because:  Drug not on the [facility] refill protocol     Pended 1 month supply of both with note to schedule appt  Recent visits for acute reasons  Dannielle DSOUZA RN    Requested Prescriptions   Pending Prescriptions Disp Refills     clopidogrel (PLAVIX) 75 MG tablet [Pharmacy Med Name: CLOPIDOGREL BISULFATE 75MG TABS] 90 tablet 1     Sig: TAKE ONE TABLET BY MOUTH EVERY DAY    Plavix Failed    2/15/2018 10:37 AM       Failed - Normal HGB on file in past 12 months    Recent Labs   Lab Test  01/31/17   0748   HGB  14.0              Failed - Normal Platelets on file in past 12 months    Recent Labs   Lab Test  01/31/17   0748   PLT  249              Passed - No active PPI on record unless is Protonix       Passed - Recent or future visit with authorizing provider's specialty    Patient had office visit in the last year or has a visit in the next 30 days with authorizing provider.  See \"Patient Info\" tab in inbasket, or \"Choose Columns\" in Meds & Orders section of the refill encounter.            Passed - Patient is age 18 or older       Passed - No active pregnancy on record       Passed - No positive pregnancy test in past 12 months        cilostazol (PLETAL) 100 MG tablet [Pharmacy Med Name: CILOSTAZOL 100MG TABS] 180 tablet 1     Sig: TAKE ONE TABLET BY MOUTH TWICE A DAY    There is no refill protocol information for this order            "

## 2018-02-19 ENCOUNTER — TELEPHONE (OUTPATIENT)
Dept: SURGERY | Facility: CLINIC | Age: 68
End: 2018-02-19

## 2018-02-19 DIAGNOSIS — K62.82 HIGH GRADE DYSPLASIA OF ANUS: Primary | ICD-10-CM

## 2018-02-20 ENCOUNTER — TELEPHONE (OUTPATIENT)
Dept: SURGERY | Facility: CLINIC | Age: 68
End: 2018-02-20

## 2018-02-20 NOTE — TELEPHONE ENCOUNTER
Per the request of Vianney Worthy NP, patient is scheduled for MRI 3T 2/28/18 at 9:00 am. Patient is tentatively held for surgery with Dr. Leo 3/14/18.  Called patient on mobile number, and left a message.  Requested a call back to discuss appointment and surgery.  Provided my direct number.  Attempted to reach patient on home phone, but number is disconnected.

## 2018-02-22 NOTE — TELEPHONE ENCOUNTER
Patient left a message returning my call.  Called and spoke with patient.  Patient confirm MRI date, time, and location 2/28/18.  Patient confirmed procedure 3/14/18.  Patient will see PAC 3/6/18.  Informed patient I will provide her with a surgery packet at her PAC appointment.  Patient verbalized understanding of plan.

## 2018-02-28 ENCOUNTER — HOSPITAL ENCOUNTER (OUTPATIENT)
Dept: MRI IMAGING | Facility: CLINIC | Age: 68
Discharge: HOME | End: 2018-02-28
Attending: NURSE PRACTITIONER | Admitting: NURSE PRACTITIONER
Payer: COMMERCIAL

## 2018-02-28 DIAGNOSIS — K62.82 HIGH GRADE DYSPLASIA OF ANUS: ICD-10-CM

## 2018-02-28 PROCEDURE — 25000128 H RX IP 250 OP 636: Performed by: NURSE PRACTITIONER

## 2018-02-28 PROCEDURE — A9585 GADOBUTROL INJECTION: HCPCS | Performed by: NURSE PRACTITIONER

## 2018-02-28 PROCEDURE — 72197 MRI PELVIS W/O & W/DYE: CPT

## 2018-02-28 RX ORDER — GADOBUTROL 604.72 MG/ML
7.5 INJECTION INTRAVENOUS ONCE
Status: COMPLETED | OUTPATIENT
Start: 2018-02-28 | End: 2018-02-28

## 2018-02-28 RX ADMIN — GADOBUTROL 4.5 ML: 604.72 INJECTION INTRAVENOUS at 10:25

## 2018-02-28 RX ADMIN — GLUCAGON HYDROCHLORIDE 1 MG: 1 INJECTION, POWDER, FOR SOLUTION INTRAMUSCULAR; INTRAVENOUS; SUBCUTANEOUS at 09:43

## 2018-03-06 ENCOUNTER — ANESTHESIA EVENT (OUTPATIENT)
Dept: SURGERY | Facility: CLINIC | Age: 68
End: 2018-03-06
Payer: COMMERCIAL

## 2018-03-06 ENCOUNTER — ALLIED HEALTH/NURSE VISIT (OUTPATIENT)
Dept: SURGERY | Facility: CLINIC | Age: 68
End: 2018-03-06
Payer: COMMERCIAL

## 2018-03-06 ENCOUNTER — OFFICE VISIT (OUTPATIENT)
Dept: SURGERY | Facility: CLINIC | Age: 68
End: 2018-03-06
Payer: COMMERCIAL

## 2018-03-06 ENCOUNTER — APPOINTMENT (OUTPATIENT)
Dept: SURGERY | Facility: CLINIC | Age: 68
End: 2018-03-06
Payer: COMMERCIAL

## 2018-03-06 VITALS
TEMPERATURE: 98.2 F | WEIGHT: 103 LBS | BODY MASS INDEX: 18.95 KG/M2 | SYSTOLIC BLOOD PRESSURE: 167 MMHG | HEIGHT: 62 IN | HEART RATE: 68 BPM | DIASTOLIC BLOOD PRESSURE: 78 MMHG | RESPIRATION RATE: 16 BRPM | OXYGEN SATURATION: 97 %

## 2018-03-06 DIAGNOSIS — Z01.818 PREOP EXAMINATION: Primary | ICD-10-CM

## 2018-03-06 ASSESSMENT — LIFESTYLE VARIABLES: TOBACCO_USE: 1

## 2018-03-06 ASSESSMENT — COPD QUESTIONNAIRES: COPD: 1
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
## 2018-03-06 NOTE — PROGRESS NOTES
[facility] medication history for March 6, 2018 is complete.    See Epic admission navigator for allergy information, pharmacy and prior to admission medications.    Operating room staff will still need to confirm medications and last dose information on day of surgery.     Medication history interview sources:  patient    Changes made to PTA medication list (reason)  Added: none  Deleted: plavix, metoprolol, myfortic, and pletel duplicate  Urea cream -not using.   Changed: culturelle dose,      Additional medication history information (including reliability of information, actions taken by pharmacist):    -- No recent (within 30 days) course of antibiotics  -- No recent (within 30 days) course of steroids  -- No recent (within 30 days) chronic daily medications stopped   -- Patient declines being on any other prescription or over-the-counter medications    Prior to Admission medications    Medication Sig Last Dose Taking? Auth Provider   ACETAMINOPHEN PO Take 1-2 tablets by mouth every 8 hours as needed for pain Taking Yes Unknown, Entered By History   clopidogrel (PLAVIX) 75 MG tablet TAKE ONE TABLET BY MOUTH EVERY DAY Taking Yes Lisa Martinez DO   cilostazol (PLETAL) 100 MG tablet TAKE ONE TABLET BY MOUTH TWICE A DAY Taking Yes Lisa Martinez DO   predniSONE (DELTASONE) 5 MG tablet Take 1 tablet (5 mg) by mouth daily Taking Yes Hitesh See MD   metoprolol (LOPRESSOR) 50 MG tablet Take 2 tablets (100 mg) by mouth 2 times daily Taking Yes Alejandro Mckinley MD   losartan (COZAAR) 25 MG tablet TAKE ONE-HALF TABLET (12.5MG) BY MOUTH EVERY DAY Taking Yes Alejandro Mckinley MD   atorvastatin (LIPITOR) 20 MG tablet TAKE ONE TABLET BY MOUTH EVERY DAY  Patient taking differently: TAKE ONE TABLET BY MOUTH EVERY DAY AT BEDTIME Taking Yes Alejandro Mckinley MD   mycophenolic acid (MYFORTIC - GENERIC EQUIVALENT) 180 MG EC tablet Take 1 tablet (180 mg) by mouth 2 times daily Taking Yes  Hitesh See MD   sirolimus (RAPAMUNE - GENERIC EQUIVALENT) 1 MG tablet Take 2 tablets (2 mg) by mouth daily Taking Yes Hitesh See MD   NIFEdipine ER osmotic (PROCARDIA XL) 90 MG 24 hr tablet Take 1 tablet (90 mg) by mouth daily Taking Yes Hitesh See MD   lactobacillus rhamnosus, GG, (CULTURELL) capsule Take 1 capsule by mouth 2 times daily  Patient taking differently: Take 1 capsule by mouth daily  Taking Yes Lisa Martinez DO   amoxicillin (AMOXIL) 500 MG capsule Take 4 capsules (2,000 mg) by mouth once as needed Prior to dental procedures Taking Yes Lisa Martinez DO   acetaminophen-codeine (TYLENOL/CODEINE #3) 300-30 MG per tablet Take 1-2 tablets by mouth every 6 hours as needed for pain Taking Yes Mukesh Carpio MD   CALCIUM 500 MG OR TABS 1 tab bid  Patient taking differently: Take 1 tablet by mouth twice daily Taking Yes Liliana Vital MD   ORDER FOR DME Equipment being ordered: TENS   Lisa Martinez DO            Medication history completed by: Romulo Mcihel, [facility]
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
## 2018-03-06 NOTE — PATIENT INSTRUCTIONS
Preparing for Your Surgery      Name:  Maribel Yang   MRN:  [id number]   :  1950   Today's Date:  3/6/2018     Arriving for surgery:  Surgery date:  3-14-18  Arrival time:  06:45 a.m.  Please come to:       [facility] [address]  [address] [place]  [address]    -   parking is available in front of the hospital from 5:15 am to 8:00 pm    -  Stop at the Information Desk in the lobby    -   Inform the information person that you are here for surgery. An escort to 3c will be provided. If you would not like an escort, please proceed to 3C on the 3rd floor. [phone]     What can I eat or drink?  -  You may have solid food or milk products until 8 hours prior to your surgery  midnight  -  You may have water, apple juice or 7up/Sprite until 2 hours prior to your surgery 06:45 a.m.    Which medicines can I take? (Please hold plavix 5 days prior to procedure last dose 3-8-18, Last dose Pletal 3-11-18  -  Do NOT take these medications in the morning, the day of surgery:     Losartan, calcium    -  Please take these medications the day of surgery:     Prednisone, Nifedipine, amoxicillin, Tylenol with Codeine as needed, Siroliums    How do I prepare myself?  -  Take two showers: one the night before surgery; and one the morning of surgery.         Use Scrubcare or Hibiclens to wash from neck down.  You may use your own shampoo and conditioner. No other hair products.   -  Do NOT use lotion, powder, deodorant, or antiperspirant the day of your surgery.  -  Do NOT wear any makeup, fingernail polish or jewelry.  -Do not bring your own medications to the hospital, except for inhalers and eye drops.  -  Bring your ID and insurance card.    Questions or Concerns:  If you have questions or concerns, please call the  Preoperative [facility] Center (PAC), Monday-Friday 7AM-7PM:  Call [phone], PAC, for questions regarding the day of surgery.  After surgery please  call your surgeons office.     AFTER YOUR SURGERY  Breathing exercises   Breathing exercises help you recover faster. Take deep breaths and let the air out slowly. This will:     Help you wake up after surgery.    Help prevent complications like pneumonia.  Preventing complications will help you go home sooner.   We may give you a breathing device (incentive spirometer) to encourage you to breathe deeply.   Nausea and vomiting   You may feel sick to your stomach after surgery; if so, let your nurse know.    Pain control:  After surgery, you may have pain. Our goal is to help you manage your pain. Pain medicine will help you feel comfortable enough to do activities that will help you heal.  These activities may include breathing exercises, walking and physical therapy.   To help your health care team treat your pain we will ask: 1) If you have pain  2) where it is located 3) describe your pain in your words  Methods of pain control include medications given by mouth, vein or by nerve block for some surgeries.  We may give you a pain control pump that will:  1) Deliver the medicine through a tube placed in your vein  2) Control the amount of medicine you receive  3) Allow you to push a button to deliver a dose of pain medicine  Sequential Compression Device (SCD) or Pneumo Boots:  You may need to wear SCD S on your legs or feet. These are wraps connected to a machine that pumps in air and releases it. The repeated pumping helps prevent blood clots from forming.
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
## 2018-03-12 ENCOUNTER — MYC MEDICAL ADVICE (OUTPATIENT)
Dept: FAMILY MEDICINE | Facility: CLINIC | Age: 68
End: 2018-03-12

## 2018-03-14 ENCOUNTER — ANESTHESIA (OUTPATIENT)
Dept: SURGERY | Facility: CLINIC | Age: 68
End: 2018-03-14
Payer: COMMERCIAL

## 2018-03-14 ENCOUNTER — HOSPITAL ENCOUNTER (OUTPATIENT)
Facility: CLINIC | Age: 68
Discharge: HOME | End: 2018-03-14
Attending: COLON & RECTAL SURGERY | Admitting: COLON & RECTAL SURGERY
Payer: COMMERCIAL

## 2018-03-14 VITALS
WEIGHT: 102.29 LBS | DIASTOLIC BLOOD PRESSURE: 79 MMHG | BODY MASS INDEX: 18.12 KG/M2 | SYSTOLIC BLOOD PRESSURE: 151 MMHG | RESPIRATION RATE: 18 BRPM | OXYGEN SATURATION: 94 % | HEIGHT: 63 IN | TEMPERATURE: 97.9 F

## 2018-03-14 DIAGNOSIS — K62.82 ANAL DYSPLASIA: Primary | ICD-10-CM

## 2018-03-14 LAB
CREAT SERPL-MCNC: 1.23 MG/DL (ref 0.52–1.04)
GFR SERPL CREATININE-BSD FRML MDRD: 44 ML/MIN/1.7M2
GLUCOSE BLDC GLUCOMTR-MCNC: 95 MG/DL (ref 70–99)
HGB BLD-MCNC: 13.1 G/DL (ref 11.7–15.7)
INTERPRETATION ECG - MUSE: NORMAL
POTASSIUM SERPL-SCNC: 3.2 MMOL/L (ref 3.4–5.3)

## 2018-03-14 PROCEDURE — 71000027 ZZH RECOVERY PHASE 2 EACH 15 MINS: Performed by: COLON & RECTAL SURGERY

## 2018-03-14 PROCEDURE — 93005 ELECTROCARDIOGRAM TRACING: CPT

## 2018-03-14 PROCEDURE — 27210794 ZZH OR GENERAL SUPPLY STERILE: Performed by: COLON & RECTAL SURGERY

## 2018-03-14 PROCEDURE — 85018 HEMOGLOBIN: CPT | Performed by: ANESTHESIOLOGY

## 2018-03-14 PROCEDURE — 25000125 ZZHC RX 250: Performed by: COLON & RECTAL SURGERY

## 2018-03-14 PROCEDURE — 25000132 ZZH RX MED GY IP 250 OP 250 PS 637: Performed by: SURGERY

## 2018-03-14 PROCEDURE — 25000128 H RX IP 250 OP 636: Performed by: NURSE ANESTHETIST, CERTIFIED REGISTERED

## 2018-03-14 PROCEDURE — 82565 ASSAY OF CREATININE: CPT | Performed by: ANESTHESIOLOGY

## 2018-03-14 PROCEDURE — 36415 COLL VENOUS BLD VENIPUNCTURE: CPT | Performed by: ANESTHESIOLOGY

## 2018-03-14 PROCEDURE — 82962 GLUCOSE BLOOD TEST: CPT

## 2018-03-14 PROCEDURE — 36000051 ZZH SURGERY LEVEL 2 1ST 30 MIN - UMMC: Performed by: COLON & RECTAL SURGERY

## 2018-03-14 PROCEDURE — 93010 ELECTROCARDIOGRAM REPORT: CPT | Performed by: INTERNAL MEDICINE

## 2018-03-14 PROCEDURE — 37000009 ZZH ANESTHESIA TECHNICAL FEE, EACH ADDTL 15 MIN: Performed by: COLON & RECTAL SURGERY

## 2018-03-14 PROCEDURE — 37000008 ZZH ANESTHESIA TECHNICAL FEE, 1ST 30 MIN: Performed by: COLON & RECTAL SURGERY

## 2018-03-14 PROCEDURE — 40000170 ZZH STATISTIC PRE-PROCEDURE ASSESSMENT II: Performed by: COLON & RECTAL SURGERY

## 2018-03-14 PROCEDURE — 88307 TISSUE EXAM BY PATHOLOGIST: CPT | Performed by: COLON & RECTAL SURGERY

## 2018-03-14 PROCEDURE — 40000065 ZZH STATISTIC EKG NON-CHARGEABLE

## 2018-03-14 PROCEDURE — 36000053 ZZH SURGERY LEVEL 2 EA 15 ADDTL MIN - UMMC: Performed by: COLON & RECTAL SURGERY

## 2018-03-14 PROCEDURE — 84132 ASSAY OF SERUM POTASSIUM: CPT | Performed by: ANESTHESIOLOGY

## 2018-03-14 RX ORDER — ACETAMINOPHEN 325 MG/1
650 TABLET ORAL
Status: DISCONTINUED | OUTPATIENT
Start: 2018-03-14 | End: 2018-03-14 | Stop reason: HOSPADM

## 2018-03-14 RX ORDER — OXYCODONE HYDROCHLORIDE 5 MG/1
5 TABLET ORAL
Status: COMPLETED | OUTPATIENT
Start: 2018-03-14 | End: 2018-03-14

## 2018-03-14 RX ORDER — PROPOFOL 10 MG/ML
INJECTION, EMULSION INTRAVENOUS PRN
Status: DISCONTINUED | OUTPATIENT
Start: 2018-03-14 | End: 2018-03-14

## 2018-03-14 RX ORDER — PROPOFOL 10 MG/ML
INJECTION, EMULSION INTRAVENOUS CONTINUOUS PRN
Status: DISCONTINUED | OUTPATIENT
Start: 2018-03-14 | End: 2018-03-14

## 2018-03-14 RX ORDER — SODIUM CHLORIDE, SODIUM LACTATE, POTASSIUM CHLORIDE, CALCIUM CHLORIDE 600; 310; 30; 20 MG/100ML; MG/100ML; MG/100ML; MG/100ML
INJECTION, SOLUTION INTRAVENOUS CONTINUOUS PRN
Status: DISCONTINUED | OUTPATIENT
Start: 2018-03-14 | End: 2018-03-14

## 2018-03-14 RX ORDER — MEPERIDINE HYDROCHLORIDE 25 MG/ML
12.5 INJECTION INTRAMUSCULAR; INTRAVENOUS; SUBCUTANEOUS
Status: DISCONTINUED | OUTPATIENT
Start: 2018-03-14 | End: 2018-03-14 | Stop reason: HOSPADM

## 2018-03-14 RX ORDER — SODIUM CHLORIDE, SODIUM LACTATE, POTASSIUM CHLORIDE, CALCIUM CHLORIDE 600; 310; 30; 20 MG/100ML; MG/100ML; MG/100ML; MG/100ML
INJECTION, SOLUTION INTRAVENOUS CONTINUOUS
Status: DISCONTINUED | OUTPATIENT
Start: 2018-03-14 | End: 2018-03-14 | Stop reason: HOSPADM

## 2018-03-14 RX ORDER — OXYCODONE HYDROCHLORIDE 5 MG/1
5 TABLET ORAL EVERY 4 HOURS PRN
Status: DISCONTINUED | OUTPATIENT
Start: 2018-03-14 | End: 2018-03-14 | Stop reason: HOSPADM

## 2018-03-14 RX ORDER — BUPIVACAINE HYDROCHLORIDE AND EPINEPHRINE 5; 5 MG/ML; UG/ML
INJECTION, SOLUTION PERINEURAL PRN
Status: DISCONTINUED | OUTPATIENT
Start: 2018-03-14 | End: 2018-03-14 | Stop reason: HOSPADM

## 2018-03-14 RX ORDER — NALOXONE HYDROCHLORIDE 0.4 MG/ML
.1-.4 INJECTION, SOLUTION INTRAMUSCULAR; INTRAVENOUS; SUBCUTANEOUS
Status: DISCONTINUED | OUTPATIENT
Start: 2018-03-14 | End: 2018-03-14 | Stop reason: HOSPADM

## 2018-03-14 RX ORDER — ONDANSETRON 2 MG/ML
4 INJECTION INTRAMUSCULAR; INTRAVENOUS EVERY 30 MIN PRN
Status: DISCONTINUED | OUTPATIENT
Start: 2018-03-14 | End: 2018-03-14 | Stop reason: HOSPADM

## 2018-03-14 RX ORDER — ONDANSETRON 2 MG/ML
INJECTION INTRAMUSCULAR; INTRAVENOUS PRN
Status: DISCONTINUED | OUTPATIENT
Start: 2018-03-14 | End: 2018-03-14

## 2018-03-14 RX ORDER — ONDANSETRON 4 MG/1
4 TABLET, ORALLY DISINTEGRATING ORAL
Status: DISCONTINUED | OUTPATIENT
Start: 2018-03-14 | End: 2018-03-14 | Stop reason: HOSPADM

## 2018-03-14 RX ORDER — ONDANSETRON 4 MG/1
4 TABLET, ORALLY DISINTEGRATING ORAL EVERY 30 MIN PRN
Status: DISCONTINUED | OUTPATIENT
Start: 2018-03-14 | End: 2018-03-14 | Stop reason: HOSPADM

## 2018-03-14 RX ORDER — HYDROMORPHONE HYDROCHLORIDE 1 MG/ML
.3-.5 INJECTION, SOLUTION INTRAMUSCULAR; INTRAVENOUS; SUBCUTANEOUS EVERY 10 MIN PRN
Status: DISCONTINUED | OUTPATIENT
Start: 2018-03-14 | End: 2018-03-14 | Stop reason: HOSPADM

## 2018-03-14 RX ORDER — ACETAMINOPHEN 325 MG/1
650 TABLET ORAL 4 TIMES DAILY
Qty: 100 TABLET | Refills: 0 | Status: SHIPPED | OUTPATIENT
Start: 2018-03-14 | End: 2018-03-28

## 2018-03-14 RX ORDER — OXYCODONE HYDROCHLORIDE 5 MG/1
5-10 TABLET ORAL EVERY 4 HOURS PRN
Qty: 30 TABLET | Refills: 0 | Status: SHIPPED | OUTPATIENT
Start: 2018-03-14 | End: 2018-08-07

## 2018-03-14 RX ADMIN — ONDANSETRON 4 MG: 2 INJECTION INTRAMUSCULAR; INTRAVENOUS at 09:21

## 2018-03-14 RX ADMIN — ACETAMINOPHEN 650 MG: 325 TABLET, FILM COATED ORAL at 10:20

## 2018-03-14 RX ADMIN — PROPOFOL 20 MG: 10 INJECTION, EMULSION INTRAVENOUS at 08:44

## 2018-03-14 RX ADMIN — MIDAZOLAM 2 MG: 1 INJECTION INTRAMUSCULAR; INTRAVENOUS at 08:25

## 2018-03-14 RX ADMIN — SODIUM CHLORIDE, POTASSIUM CHLORIDE, SODIUM LACTATE AND CALCIUM CHLORIDE: 600; 310; 30; 20 INJECTION, SOLUTION INTRAVENOUS at 08:25

## 2018-03-14 RX ADMIN — PROPOFOL 150 MCG/KG/MIN: 10 INJECTION, EMULSION INTRAVENOUS at 08:38

## 2018-03-14 RX ADMIN — HYDROMORPHONE HYDROCHLORIDE 0.5 MG: 1 INJECTION, SOLUTION INTRAMUSCULAR; INTRAVENOUS; SUBCUTANEOUS at 08:47

## 2018-03-14 RX ADMIN — OXYCODONE HYDROCHLORIDE 5 MG: 5 TABLET ORAL at 10:33

## 2018-03-14 NOTE — IP AVS SNAPSHOT
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 known as:  PLAVIX   TAKE ONE TABLET BY MOUTH EVERY DAY                                lactobacillus rhamnosus (GG) capsule   Take 1 capsule by mouth 2 times daily                                losartan 25 MG tablet   Commonly known as:  COZAAR   TAKE ONE-HALF TABLET (12.5MG) BY MOUTH EVERY DAY                                metoprolol tartrate 50 MG tablet   Commonly known as:  LOPRESSOR   Take 2 tablets (100 mg) by mouth 2 times daily                                mycophenolic acid 180 MG EC tablet   Commonly known as:  GENERIC EQUIVALENT   Take 1 tablet (180 mg) by mouth 2 times daily                                NIFEdipine ER osmotic 90 MG 24 hr tablet   Commonly known as:  PROCARDIA XL   Take 1 tablet (90 mg) by mouth daily                                order for DME   Equipment being ordered: TENS                                 oxyCODONE IR 5 MG tablet   Commonly known as:  ROXICODONE   Take 1-2 tablets (5-10 mg) by mouth every 4 hours as needed for severe pain                                predniSONE 5 MG tablet   Commonly known as:  DELTASONE   Take 1 tablet (5 mg) by mouth daily                                sirolimus 1 MG tablet   Commonly known as:  GENERIC EQUIVALENT   Take 2 tablets (2 mg) by mouth daily                                * Notice:  This list has 2 medication(s) that are the same as other medications prescribed for you. Read the directions carefully, and ask your doctor or other care provider to review them with you.

## 2018-03-14 NOTE — ANESTHESIA CARE TRANSFER NOTE
Patient: Maribel Yang    Procedure(s):  Anal Exam Under Anesthesia With Excision of anal lesion, proctoscopy - Wound Class: IV-Dirty or Infected   - Wound Class: IV-Dirty or Infected    Diagnosis: High Grade Dysplasia Of Anus   Diagnosis Additional Information: No value filed.    Anesthesia Type:   MAC     Note:  Airway :Room Air  Patient transferred to:Phase II  Comments: Anesthesia Care Transfer Note    Patient: Maribel Yang    Transferred to: PACU    Patient vital signs: stable    Airway: none    Monitors on, VSS, pt. Stable, Report given to PACU RN.     Angel Reddy CRNA  3/14/2018 9:35 AM      Handoff Report: Identifed the Patient, Identified the Reponsible Provider, Reviewed the pertinent medical history, Discussed the surgical course, Reviewed Intra-OP anesthesia mangement and issues during anesthesia, Set expectations for post-procedure period and Allowed opportunity for questions and acknowledgement of understanding      Vitals: (Last set prior to Anesthesia Care Transfer)    CRNA VITALS  3/14/2018 0900 - 3/14/2018 0935      3/14/2018             Pulse: 76    SpO2: 98 %    Resp Rate (set): 10                Electronically Signed By: SANDRA Escalante CRNA  March 14, 2018  9:35 AM

## 2018-03-14 NOTE — IP AVS SNAPSHOT
Same Day Surgery [address] [address]    Phone:  [phone]                                       After Visit Summary   3/14/2018    Maribel Yang    MRN: [id number]           After Visit Summary Signature Page     I have received my discharge instructions, and my questions have been answered. I have discussed any challenges I see with this plan with the nurse or doctor.    ..........................................................................................................................................  Patient/Patient Representative Signature      ..........................................................................................................................................  Patient Representative Print Name and Relationship to Patient    ..................................................               ................................................  Date                                            Time    ..........................................................................................................................................  Reviewed by Signature/Title    ...................................................              ..............................................  Date                                                            Time

## 2018-03-14 NOTE — DISCHARGE INSTRUCTIONS
Anorectal Surgery Instructions    What can I expect after anorectal surgery?  Most anorectal procedures are done as outpatient surgery, and you go home the same day as the procedure. A few surgical procedures will require that you stay in the hospital for about one to three days. No matter where the procedure is done or how long or short it takes, these recommendations will help you heal and feel more comfortable.    Medicines:  The anal area is very sensitive; you can expect to have some pain for up to 2-4 weeks after the procedure. Your doctor will give you a prescription for one or more pain medications.    Take naprosyn 500 mg twice a day OR ibuprofen 600 mg four times a day     Take this on a regular basis (not as needed) following your surgery.     The drugs are best taken with food.  Do not take if it causes stomach upset or if you have a history of ulcers or gastritis. You can stop the naprosyn (or ibuprofen) or reduce the dose when you are feeling better.    DO NOT use naprosyn, ibuprofen, or other similar agents (eg. Advil or Aleve) if you have inflammatory bowel disease (Ulcerative Colitis or Crohn's disease) or if your doctor as advised you against using these medications    Take acetominaphen (Tylenol) 650-1000 mg four times a day.     Take this on a regular basis (not as needed) following surgery for pain control.     Take the lower dose if you are >65 years old or have liver disease. The maximum dose of acetominaphen is 4000 mg a day. You can stop the acetaminophen or reduce the dose when you are feeling better.    It is important to realize that many narcotic pain relievers (including vicodin, percocet, tylenol #3) also have acetaminophen, and excessive doses of acetaminophen can be dangerous, so do not take these in addition to acetominaphen.  You may take narcotics that don't contain acetominaphen such as oxycodone.      Take oxycodone AS NEEDED in addition to the acetominaphen and naprosyn.       Because narcotics have side effects (including constipation), you should reduce your use of these medications as tolerated as your pain improves.    *In general, the best strategy is to take (if you are able to tolerate it) the tylenol and naprosen on a regular basis until your pain has largely gone away. You can take the narcotic pain medicine as needed in addition to the tylenol and ibuprofen. As your pain begins to lessen, you should cut back on your narcotic use while continuing to take your regular tylenol and naprosyn doses.      Refilling prescriptions. If you need additional pain medication, please call the triage nurse at [phone] during normal business hours (8 a.m. to 4 p.m., Monday though Friday) or have your pharmacy fax a refill request to [phone]. If you call after hours or on the weekends, the doctor on call may not know you personally and may not renew narcotic pain medication by phone. Call your primary care provider for all other medication refills.    Perineal care:  External gauze dressing can be removed the morning after surgery. If you have an adhesive dressing stuck to the incision, DO NOT remove this.   Tub baths:    If possible, take a tub bath immediately after each bowel movement.     Baths should be take at least 3 times daily for the first week to 10 days following your procedure. You should soak in the tub for 10 to 15 minutes each time with water as warm as you can tolerate.     Even after you go back to work, it is a good idea to sit in the tub in the morning, after returning from work, and again in the evening before bedtime.    Bleeding/Infection:    You can expect to have some bleeding after bowel movements, but it should stop soon after you wipe.     Use a wet cloth or perianal pad (Tucks or Preparation H pads) to gently wipe the area after each bowel movement.    Do not rub the anal area or use a lot of pressure.    Using a spray bottle filled with warm water helps  loosen any remaining stool. Blot gently with a soft dry cloth or tissue paper.    Infection around the anal opening is not very common. The anal area has excellent blood supply, which helps the area to heal. Bloody discharge after bowel movements is normal and may last 2 to 4 weeks after your surgery. However, if you bleed between bowel movements and cannot get it to stop, call the triage nurse immediately [phone].    Bowel function:  Take a fiber supplement such as Metamucil, which is over the counter. It is important to drink six to eight glasses of water or juice everyday when using fiber products.    If you do not have a bowel movement after 1-2 days:    Take Milk of Magnesia-2 tablespoons.       If there are no results, repeat this or add over the counter Miralax.      If you still do not have results, contact the clinic.     If there are no results, repeat this. Stop taking Milk of Magnesia or other laxatives if you begin to have diarrhea.    * Constipation will cause you to strain when you have a bowel movement. The hard stool will be difficult to pass, will increase pain and bleeding, and will slow down healing.  Try to avoid constipation and/or diarrhea as this can make the pain and bleeding worse.    * It is important to have regular bowel movements at least every other day and to keep your stool soft.  A high fiber diet, including at least four servings of fruits or vegetables daily, will help to keep your bowel movements regular and soft.    Activity:  After your procedure, there are no restrictions on your activity     except restrictions surrounding being on narcotics and in pain, such as no heavy machine operating or driving.     You may walk, climb stairs, ride in a car, and sit as tolerated.     It is helpful to avoid sitting in one position for long periods (2 or more hours).    After some surgeries, you may be told not to perform any lifting (more than 10 pounds) for several weeks after  surgery.    When to call:  When do I need to call the doctor or triage nurse?    If you experience any of the problems listed here, call our triage nurse during business hours ([phone]).     The nurse will help you with your problem or have the doctor call you.     After hours and on weekends, please call the main hospital number ([phone]) and ask for the colon and rectal surgery person on call.     Some is available to help you 24 hours a day, seven days a week.    Call for:   ? Fever greater than 101 degrees   ? Chills   ? Foul-smelling drainage   ? Nausea and vomiting   ? Diarrhea - greater than 3 water stools in 24 hours   ? Constipation - no bowel movement after 3 days   ? Severe bleeding that does not stop soon after a bowel movement   ? Problems with the incision, including increased pain, swelling, or redness    [facility]  Same-Day Surgery   Adult Discharge Orders & Instructions     For 24 hours after surgery    1. Get plenty of rest.  A responsible adult must stay with you for at least 24 hours after you leave the hospital.   2. Do not drive or use heavy equipment.  If you have weakness or tingling, don't drive or use heavy equipment until this feeling goes away.  3. Do not drink alcohol.  4. Avoid strenuous or risky activities.  Ask for help when climbing stairs.   5. You may feel lightheaded.  IF so, sit for a few minutes before standing.  Have someone help you get up.   6. If you have nausea (feel sick to your stomach): Drink only clear liquids such as apple juice, ginger ale, broth or 7-Up.  Rest may also help.  Be sure to drink enough fluids.  Move to a regular diet as you feel able.  7. You may have a slight fever. Call the doctor if your fever is over 100 F (37.7 C) (taken under the tongue) or lasts longer than 24 hours.  8. You may have a dry mouth, a sore throat, muscle aches or trouble sleeping.  These should go away after 24 hours.  9. Do not make  important or legal decisions.   Call your doctor for any of the followin.  Signs of infection (fever, growing tenderness at the surgery site, a large amount of drainage or bleeding, severe pain, foul-smelling drainage, redness, swelling).    2. It has been over 8 to 10 hours since surgery and you are still not able to urinate (pass water).    3.  Headache for over 24 hours.    To contact a doctor, call Dr Leo's office at  [phone]  or:        [phone] and ask for the resident on call for Colon and rectal surgery (answered 24 hours a day)      Emergency Department:    [facility]: [phone]       (TTY for hearing impaired: [phone])               Tips for taking pain medications  To get the best pain relief possible , remember these points:      Take pain medications as directed, before pain becomes severe      Pain medication can upset your stomach: taking it with food may help      Constipation is a common side effect of pain medication. Drink plenty of  Fluids      Eat foods high in fiber. Take a stool softener  if recommended by your doctor or  Pharmacist.        Do not drink alcohol, drive or operate machinery while taking pain medications.      Ask about other ways to control pain, such as with heat, ice or relaxation.

## 2018-03-14 NOTE — ANESTHESIA POSTPROCEDURE EVALUATION
Patient: Maribel Yang    Procedure(s):  Anal Exam Under Anesthesia With Excision of anal lesion, proctoscopy - Wound Class: IV-Dirty or Infected   - Wound Class: IV-Dirty or Infected    Diagnosis:High Grade Dysplasia Of Anus   Diagnosis Additional Information: No value filed.    Anesthesia Type:  MAC    Note:  Anesthesia Post Evaluation    Patient location during evaluation: Phase 2  Patient participation: Able to fully participate in evaluation  Level of consciousness: awake and alert  Pain management: adequate  Airway patency: patent  Cardiovascular status: acceptable  Respiratory status: acceptable  Hydration status: acceptable  PONV: none     Anesthetic complications: None          Last vitals:  Vitals:    03/14/18 0720 03/14/18 0935   BP: 163/83 130/75   Resp: 16 14   Temp: 36.6  C (97.8  F) 36.7  C (98.1  F)   SpO2: 100% 94%         Electronically Signed By: Krista Lyles MD  March 14, 2018  10:01 AM

## 2018-03-14 NOTE — OP NOTE
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      PROCEDURE:  Examination under anesthesia with full thickness excision of superficially invasive anal cancer; proctoscopy.      HISTORY:  This 67-year-old kidney transplant patient has a history of previous anal condyloma, YUNIOR 2, AIN 3.  Recently underwent anal microscopy as a follow-up examination.  Examination demonstrated a palpable nodule in the left lateral anal canal.  This was biopsied and results demonstrated a superficially invasive squamous cell carcinoma.  Examination under anesthesia with excision of the nodule for further staging and possible definitive treatment was recommended.  All risks and alternatives were outlined in detail with the patient.  I answered all of her questions to her stated satisfaction.  She expressed understanding and provided informed consent.      SURGEON:  Shabbir Leo MD      ASSISTANT:  Sandeep Oden MD      DESCRIPTION OF PROCEDURE:  With the patient in the prone jackknife position and the buttocks taped apart, under intravenous sedation by Anesthesia, the perianal skin was prepped and draped in a sterile fashion.  A timeout was performed and the patient and procedure confirmed.  Local infiltration of 0.5% Marcaine with epinephrine was utilized and a satisfactory perianal block was obtained.  Examination under anesthesia demonstrated an easily palpable nodule in the left lateral anal canal just above the dentate line and just slightly anterior to the mid anal line.  The lesion measured about 1 cm in diameter, and there was some slight associated mucosal abnormality surrounding it.  We marked out our lines of transection just outside the abnormal mucosa.  We divided the internal anal sphincter beginning above the dentate line and  removed the full thickness in the internal sphincter beneath the lesion.  The final excised mass was about 2 cm in diameter.  The distal margin of the lesion was marked with a stitch.  The specimen was pinned out on a board to optimize pathologic evaluation.  The wound was inspected and was entirely hemostatic.  It was closed in a transverse fashion with interrupted 3-0 Vicryls.  This resulted in a nice anatomic closure.  There was an easily palpable lumen.  Proctoscopy was performed to complete this and was carried out to about 10 cm, well above our suture closure.  The suture line was hemostatic and procedure was terminated.  Estimated blood loss was 5 mL.  The patient tolerated the procedure well without evident complications.  Sponge, needle and instrument counts were reported as correct at the conclusion of case x 2.         ADENIKE WALKER MD             D: 2018   T: 2018   MT: CC      Name:     ERASMO MERINO   MRN:      [id number]        Account:        [id number]   :      1950           Procedure Date: 2018      Document: [id number]       cc: Vianney Flores MSN, NP-C       DONOVAN Martinez DO       [facility] Billing

## 2018-03-15 ENCOUNTER — TELEPHONE (OUTPATIENT)
Dept: SURGERY | Facility: CLINIC | Age: 68
End: 2018-03-15

## 2018-03-15 ENCOUNTER — CARE COORDINATION (OUTPATIENT)
Dept: SURGERY | Facility: CLINIC | Age: 68
End: 2018-03-15

## 2018-03-15 NOTE — PROGRESS NOTES
RN Post Op Care Coordination Note     POST-OP CALL      Patient is s/p Anal Exam Under Anesthesia With Excision of anal lesion, proctoscopy.     Reports doing well.       Fevers/chills: Patient denies fever/chills.  Eating/drinking: Patient is able to eat and drink without any complaints. Drinking 8-10 glasses of fluids per day. Tolerating low fiber diet.   Bowel habits: Patient reports having a bowel movement today.  Urine output: Voiding without difficulty, light yellow urine.  Pain: Patient reports pain is controlled with tylenol and oxycodone.  Narcotics: Oxycodone  Follow up appointment is scheduled with Saida on Monday March 30th at 1:30 p.m. Patient agreed to arrive 15 minutes early.      Patient will call with any questions or concerns, all current questions and concerns were addressed to patient's satisfaction, patient aware and in agreement with current plan of care.    AVERY Jackson Care Coordinator  Colon & Rectal Surgery Clinic  [facility]  [phone]

## 2018-03-15 NOTE — TELEPHONE ENCOUNTER
----- Message from Sandeep Oden MD sent at 3/14/2018  9:35 AM CDT -----  Regarding: Please schedule for colorectal surgery follow up  Please schedule Maribel for follow up with EVM in clinic in 1-2 weeks.     She underwent excision of anal lesion with Dr. Leo on 3/14/2018.     Thanks,  Sandeep Oden, PGY-3

## 2018-03-16 DIAGNOSIS — I73.9 PERIPHERAL ARTERY DISEASE (H): ICD-10-CM

## 2018-03-16 NOTE — TELEPHONE ENCOUNTER
"Tried to leave VM - unable to   Sent MyChart to pt - due for physical and labs  Dannielle DSOUZA RN    Requested Prescriptions   Pending Prescriptions Disp Refills     clopidogrel (PLAVIX) 75 MG tablet [Pharmacy Med Name: CLOPIDOGREL BISULFATE 75MG TABS] 30 tablet 0     Sig: TAKE ONE TABLET BY MOUTH EVERY DAY. (DUE FOR AN APPOINTMENT)    Plavix Failed    3/16/2018  1:26 PM       Failed - Normal Platelets on file in past 12 months    Recent Labs   Lab Test  01/31/17   0748   PLT  249              Passed - No active PPI on record unless is Protonix       Passed - Normal HGB on file in past 12 months    Recent Labs   Lab Test  03/14/18   0759   HGB  13.1              Passed - Recent (12 mo) or future (30 days) visit within the authorizing provider's specialty    Patient had office visit in the last 12 months or has a visit in the next 30 days with authorizing provider or within the authorizing provider's specialty.  See \"Patient Info\" tab in inbasket, or \"Choose Columns\" in Meds & Orders section of the refill encounter.           Passed - Patient is age 18 or older       Passed - No active pregnancy on record       Passed - No positive pregnancy test in past 12 months        cilostazol (PLETAL) 100 MG tablet [Pharmacy Med Name: CILOSTAZOL 100MG TABS] 60 tablet 0     Sig: TAKE ONE TABLET BY MOUTH TWICE A DAY. (DUE FOR AN APPOINTMENT)    There is no refill protocol information for this order            "

## 2018-03-22 NOTE — TELEPHONE ENCOUNTER
PN,  No see below messages and MyChart to pt  Patient has not responded to our outreach  Please advise on further refills  Thanks,  Dannielle DSOUZA RN

## 2018-03-23 RX ORDER — CLOPIDOGREL BISULFATE 75 MG/1
TABLET ORAL
Qty: 30 TABLET | Refills: 1 | Status: SHIPPED | OUTPATIENT
Start: 2018-03-23 | End: 2018-07-20

## 2018-03-23 RX ORDER — CILOSTAZOL 100 MG/1
TABLET ORAL
Qty: 60 TABLET | Refills: 1 | Status: SHIPPED | OUTPATIENT
Start: 2018-03-23 | End: 2018-07-20

## 2018-03-24 LAB — COPATH REPORT: NORMAL

## 2018-03-29 ENCOUNTER — TELEPHONE (OUTPATIENT)
Dept: OTHER | Facility: CLINIC | Age: 68
End: 2018-03-29

## 2018-03-30 ENCOUNTER — OFFICE VISIT (OUTPATIENT)
Dept: SURGERY | Facility: CLINIC | Age: 68
End: 2018-03-30
Payer: COMMERCIAL

## 2018-03-30 VITALS
BODY MASS INDEX: 18.99 KG/M2 | DIASTOLIC BLOOD PRESSURE: 77 MMHG | HEIGHT: 62 IN | TEMPERATURE: 97.9 F | SYSTOLIC BLOOD PRESSURE: 140 MMHG | WEIGHT: 103.2 LBS | OXYGEN SATURATION: 97 % | HEART RATE: 82 BPM

## 2018-03-30 DIAGNOSIS — Z09 FOLLOW-UP EXAMINATION FOLLOWING SURGERY: ICD-10-CM

## 2018-03-30 DIAGNOSIS — C21.1 MALIGNANT NEOPLASM OF ANAL CANAL (H): Primary | ICD-10-CM

## 2018-03-30 ASSESSMENT — PAIN SCALES - GENERAL: PAINLEVEL: NO PAIN (0)
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
## 2018-03-30 NOTE — NURSING NOTE
"Chief Complaint   Patient presents with     Surgical Followup     Post op visit, surgery 3/14/18 with Dr. Leo.        Vitals:    03/30/18 1335   BP: 140/77   BP Location: Left arm   Patient Position: Chair   Cuff Size: Adult Regular   Pulse: 82   Temp: 97.9  F (36.6  C)   TempSrc: Oral   SpO2: 97%   Weight: 103 lb 3.2 oz   Height: 5' 2\"       Body mass index is 18.88 kg/(m^2).  Dimitris IZAGUIRRE LPN                  "
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
## 2018-04-02 ENCOUNTER — TELEPHONE (OUTPATIENT)
Dept: SURGERY | Facility: CLINIC | Age: 68
End: 2018-04-02

## 2018-04-02 NOTE — TELEPHONE ENCOUNTER
Dr. Mckeon has confirmed he would like to continue providing treatment for patient's newly diagnosed anal cancer (currently established for lung cancer).  Patient's appointment with Dr. Mckeon has been rescheduled to 4/10/18 at 2:30 pm.  Patient is scheduled to see Dr. Tucker 4/9/18 at 8:45 am (medical oncologist not available same day as Dr. Mckeon).  Called and spoke with patient. Patient confirms both appointments and check-in locations.  Patient has our direct number for questions or concerns.

## 2018-04-04 ENCOUNTER — TELEPHONE (OUTPATIENT)
Dept: SURGERY | Facility: CLINIC | Age: 68
End: 2018-04-04

## 2018-04-09 ENCOUNTER — APPOINTMENT (OUTPATIENT)
Dept: LAB | Facility: CLINIC | Age: 68
End: 2018-04-09
Payer: COMMERCIAL

## 2018-04-09 ENCOUNTER — ONCOLOGY VISIT (OUTPATIENT)
Dept: ONCOLOGY | Facility: CLINIC | Age: 68
End: 2018-04-09
Attending: INTERNAL MEDICINE
Payer: COMMERCIAL

## 2018-04-09 VITALS
RESPIRATION RATE: 18 BRPM | SYSTOLIC BLOOD PRESSURE: 169 MMHG | TEMPERATURE: 96.9 F | DIASTOLIC BLOOD PRESSURE: 88 MMHG | HEIGHT: 62 IN | BODY MASS INDEX: 19.21 KG/M2 | HEART RATE: 65 BPM | WEIGHT: 104.4 LBS | OXYGEN SATURATION: 96 %

## 2018-04-09 DIAGNOSIS — C21.1 MALIGNANT NEOPLASM OF ANAL CANAL (H): Primary | ICD-10-CM

## 2018-04-09 LAB
ALBUMIN SERPL-MCNC: 3.1 G/DL (ref 3.4–5)
ALP SERPL-CCNC: 90 U/L (ref 40–150)
ALT SERPL W P-5'-P-CCNC: 13 U/L (ref 0–50)
ANION GAP SERPL CALCULATED.3IONS-SCNC: 8 MMOL/L (ref 3–14)
AST SERPL W P-5'-P-CCNC: 12 U/L (ref 0–45)
BASOPHILS # BLD AUTO: 0 10E9/L (ref 0–0.2)
BASOPHILS NFR BLD AUTO: 0.4 %
BILIRUB SERPL-MCNC: 0.2 MG/DL (ref 0.2–1.3)
BUN SERPL-MCNC: 29 MG/DL (ref 7–30)
CALCIUM SERPL-MCNC: 9.2 MG/DL (ref 8.5–10.1)
CHLORIDE SERPL-SCNC: 109 MMOL/L (ref 94–109)
CO2 SERPL-SCNC: 25 MMOL/L (ref 20–32)
CREAT SERPL-MCNC: 1.32 MG/DL (ref 0.52–1.04)
DIFFERENTIAL METHOD BLD: ABNORMAL
EOSINOPHIL # BLD AUTO: 0.1 10E9/L (ref 0–0.7)
EOSINOPHIL NFR BLD AUTO: 1.6 %
ERYTHROCYTE [DISTWIDTH] IN BLOOD BY AUTOMATED COUNT: 13.8 % (ref 10–15)
GFR SERPL CREATININE-BSD FRML MDRD: 40 ML/MIN/1.7M2
GLUCOSE SERPL-MCNC: 123 MG/DL (ref 70–99)
HCT VFR BLD AUTO: 40.6 % (ref 35–47)
HGB BLD-MCNC: 13.5 G/DL (ref 11.7–15.7)
HIV 1+2 AB+HIV1 P24 AG SERPL QL IA: NONREACTIVE
IMM GRANULOCYTES # BLD: 0 10E9/L (ref 0–0.4)
IMM GRANULOCYTES NFR BLD: 0.5 %
LYMPHOCYTES # BLD AUTO: 0.4 10E9/L (ref 0.8–5.3)
LYMPHOCYTES NFR BLD AUTO: 5.1 %
MCH RBC QN AUTO: 28.8 PG (ref 26.5–33)
MCHC RBC AUTO-ENTMCNC: 33.3 G/DL (ref 31.5–36.5)
MCV RBC AUTO: 87 FL (ref 78–100)
MONOCYTES # BLD AUTO: 0.6 10E9/L (ref 0–1.3)
MONOCYTES NFR BLD AUTO: 7 %
NEUTROPHILS # BLD AUTO: 7.1 10E9/L (ref 1.6–8.3)
NEUTROPHILS NFR BLD AUTO: 85.4 %
NRBC # BLD AUTO: 0 10*3/UL
NRBC BLD AUTO-RTO: 0 /100
PLATELET # BLD AUTO: 251 10E9/L (ref 150–450)
POTASSIUM SERPL-SCNC: 3.4 MMOL/L (ref 3.4–5.3)
PROT SERPL-MCNC: 7.1 G/DL (ref 6.8–8.8)
RBC # BLD AUTO: 4.68 10E12/L (ref 3.8–5.2)
SODIUM SERPL-SCNC: 142 MMOL/L (ref 133–144)
WBC # BLD AUTO: 8.3 10E9/L (ref 4–11)

## 2018-04-09 PROCEDURE — 80053 COMPREHEN METABOLIC PANEL: CPT | Performed by: INTERNAL MEDICINE

## 2018-04-09 PROCEDURE — 99205 OFFICE O/P NEW HI 60 MIN: CPT | Mod: ZP | Performed by: INTERNAL MEDICINE

## 2018-04-09 PROCEDURE — G0463 HOSPITAL OUTPT CLINIC VISIT: HCPCS | Mod: ZF

## 2018-04-09 PROCEDURE — 36415 COLL VENOUS BLD VENIPUNCTURE: CPT | Performed by: INTERNAL MEDICINE

## 2018-04-09 PROCEDURE — 87389 HIV-1 AG W/HIV-1&-2 AB AG IA: CPT | Performed by: INTERNAL MEDICINE

## 2018-04-09 PROCEDURE — 85025 COMPLETE CBC W/AUTO DIFF WBC: CPT | Performed by: INTERNAL MEDICINE

## 2018-04-09 ASSESSMENT — PAIN SCALES - GENERAL: PAINLEVEL: NO PAIN (0)
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
## 2018-04-09 NOTE — NURSING NOTE
"Oncology Rooming Note    April 9, 2018 8:49 AM   Maribel Yang is a 67 year old female who presents for:    Chief Complaint   Patient presents with     Oncology Clinic Visit     New patient visit related to Anal Cancer     Initial Vitals: /88 (BP Location: Left arm, Patient Position: Sitting, Cuff Size: Adult Small)  Pulse 65  Temp 96.9  F (36.1  C)  Resp 18  Ht 1.575 m (5' 2.01\")  Wt 47.4 kg (104 lb 6.4 oz)  SpO2 96%  BMI 19.09 kg/m2 Estimated body mass index is 19.09 kg/(m^2) as calculated from the following:    Height as of this encounter: 1.575 m (5' 2.01\").    Weight as of this encounter: 47.4 kg (104 lb 6.4 oz). Body surface area is 1.44 meters squared.  No Pain (0) Comment: Data Unavailable   No LMP recorded. Patient is postmenopausal.  Allergies reviewed: Yes  Medications reviewed: Yes    Medications: Medication refills not needed today.  Pharmacy name entered into [employer]:    [facility]  [facility] - [place], [place] - [facility] [address]    Clinical concerns: No new concerns. Provider was notified.    10 minutes for nursing intake (face to face time)     Gabriella Cox LPN            "
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
## 2018-04-10 ENCOUNTER — OFFICE VISIT (OUTPATIENT)
Dept: RADIATION ONCOLOGY | Facility: CLINIC | Age: 68
End: 2018-04-10
Attending: RADIOLOGY
Payer: COMMERCIAL

## 2018-04-10 VITALS
BODY MASS INDEX: 18.3 KG/M2 | SYSTOLIC BLOOD PRESSURE: 178 MMHG | HEIGHT: 63 IN | HEART RATE: 67 BPM | DIASTOLIC BLOOD PRESSURE: 79 MMHG | WEIGHT: 103.3 LBS

## 2018-04-10 DIAGNOSIS — C21.1 MALIGNANT NEOPLASM OF ANAL CANAL (H): Primary | ICD-10-CM

## 2018-04-10 PROCEDURE — G0463 HOSPITAL OUTPT CLINIC VISIT: HCPCS | Performed by: RADIOLOGY

## 2018-04-10 ASSESSMENT — ENCOUNTER SYMPTOMS
HEADACHES: 0
FREQUENCY: 0
WEIGHT LOSS: 0
BLOOD IN STOOL: 0
COUGH: 0
FEVER: 0
BACK PAIN: 0
DYSURIA: 0
ABDOMINAL PAIN: 0
NECK PAIN: 0
CONSTIPATION: 0
VOMITING: 0
WHEEZING: 0
DEPRESSION: 0
SORE THROAT: 0
DIARRHEA: 1
NAUSEA: 0
BLURRED VISION: 0
SHORTNESS OF BREATH: 0
HEARTBURN: 0
DIZZINESS: 0
CHILLS: 0
NERVOUS/ANXIOUS: 0
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
## 2018-04-10 NOTE — PROGRESS NOTES
HPI  INITIAL PATIENT ASSESSMENT    Diagnosis: anal cancer    Prior radiation therapy:   Site Treated: chest  Facility: [facility]  Dates: 2014  Dose:     Prior chemotherapy: None    Prior hormonal therapy:Yes: 2004 HRT    Pain Eval:  Denies    Psychosocial  Living arrangements:   Fall Risk: independent   referral needs: Not needed    Advanced Directive: No  Implantable Cardiac Device? No    Onset of menarche: 16  LMP: No LMP recorded. Patient is postmenopausal.  Onset of menopause: 44  Abnormal vaginal bleeding/discharge: No  Are you pregnant? No  Reproductive note: 4 grown children    Nurse face-to-face time: Level 4:  15 min face to face time    Review of Systems   Constitutional: Positive for malaise/fatigue. Negative for chills, fever and weight loss.   HENT: Negative for congestion, hearing loss, sore throat and tinnitus.    Eyes: Negative for blurred vision.   Respiratory: Negative for cough, shortness of breath and wheezing.    Cardiovascular: Positive for leg swelling. Negative for chest pain.   Gastrointestinal: Positive for diarrhea. Negative for abdominal pain, blood in stool, constipation, heartburn, nausea and vomiting.   Genitourinary: Negative for dysuria, frequency and urgency.   Musculoskeletal: Negative for back pain and neck pain.   Skin: Negative for itching and rash.   Neurological: Negative for dizziness and headaches.   Endo/Heme/Allergies: Negative for environmental allergies.   Psychiatric/Behavioral: Negative for depression. The patient is not nervous/anxious.

## 2018-04-10 NOTE — PROGRESS NOTES
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 Completed 3/24/2014.     TYPE OF RADIOTHERAPY DELIVERED: 5400 cGy, 3 fractions via SBRT, 90% isodose     HISTORY OF PRESENT ILLNESS: Ms. Yang is a 67-year-old woman with complex medical history, including kidney transplant, who was previously treated for for NSCLC with SBRT, as described above. She has continued regular follow up with Dr. Mckeon. She was last seen for follow up on 2017, with a chest CT which showed no evidence of new or progressive disease. Please see previous follow up notes for more detailed history regarding diagnosis and treatment of her lung cancer.      She also has a history of anal condyloma and AIN3, for which she is regularly followed by colorectal surgery. Recently, she was seen for surveillance anoscopy, which showed a palpable nodule in the left lateral josh canal. This was biopsied and results demonstrated a superficially invasive squamous cell carcinoma. An MRI was completed which was unable to clearly visualize any tumor or suspicious nodes.     She underwent EUA on 3/14/2018. This demonstrated a palpable nodule in the left lateral anal canal just above the dentate line and just slightly anterior to the mid anal line, measuring about 1 cm in diameter with slight associated mucosal abnormality surrounding it. The lesion was excised with the final mass measuring approximately 2 cm in diameter. The surgical pathology demonstrated poorly differentiated SCC, measuring 7 mm in greatest dimension with invasion into the anal sphincter muscle. The margins were negative, but tumor did extend to 1 mm from the deep margin. She was subsequently referred to our clinic and to medical oncology for discussion regarding treatment  "with chemoradiation.    On exam today, Ms. Yang reports feeling in her normal state of health. She had some mild blood in her stool following biopsy, but otherwise denies any GI symptoms including any diarrhea or constipation. She denies any pain with defecation. She continues to be short of breath, but this has not changed. She otherwise has no additional concerns or complaints and remainder of ROS is negative.     PHYSICAL EXAMINATION:  Vitals: /79  Pulse 67  Ht 1.6 m (5' 3\")  Wt 46.9 kg (103 lb 4.8 oz)  BMI 18.3 kg/m2  BMI= Body mass index is 18.3 kg/(m^2).  Gen: Alert. No acute distress.   Pulm: No wheezing, stridor or respiratory distress  CV:. Well-perfused, no cyanosis, no pedal edema  Musculoskeletal: Normal muscle bulk and tone  Rectal: Normal sphincter tone. 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      RECOMMENDATION: We recommend treatment with chemoradiation. We explained that while her disease is early stage, her pathology showed features that put her at high risk for recurrence, including high grade, invasion of the sphincter muscle, and close margins.We explained that the goal of chemoradiation is to reduce the risk of recurrence, while also sparing function of the anal sphincter. However, there is still a risk that treatment may lead to fecal incontinence.     We reviewed the process of, acute side effects, and other potential long term complications associated with radiation therapy for anal cancer. The patient asked appropriate questions, which were answered to her satisfaction, and verbalized understanding. An informed consent was signed.      Ms. Yang was seen and assessed with my staff, Dr." Mike Morris MD  Resident, Radiation Oncology       I saw the patient with the resident.  I agree with the resident's note and plan of care.      HERO Mckeon M.D.  Department of Radiation Oncology  [facility]  Patient Care Team:  Lisa Martinez DO as PCP - General (Family Practice)  Hitesh See MD as MD (Nephrology)  Liliana Renteria MD as MD (Oncology)

## 2018-04-10 NOTE — LETTER
4/10/2018     RE: Maribel Yang  [address]  [facility] [address]     Dear Colleague,    Thank you for referring your patient, Maribel Yang, to the RADIATION ONCOLOGY CLINIC. Please see a copy of my visit note below.    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 Completed 3/24/2014.     TYPE OF RADIOTHERAPY DELIVERED: 5400 cGy, 3 fractions via SBRT, 90% isodose     HISTORY OF PRESENT ILLNESS: Ms. Yang is a 67-year-old woman with complex medical history, including kidney transplant, who was previously treated for for NSCLC with SBRT, as described above. She has continued regular follow up with Dr. Mckeon. She was last seen for follow up on 2017, with a chest CT which showed no evidence of new or progressive disease. Please see previous follow up notes for more detailed history regarding diagnosis and treatment of her lung cancer.      She also has a history of anal condyloma and AIN3, for which she is regularly followed by colorectal surgery. Recently, she was seen for surveillance anoscopy, which showed a palpable nodule in the left lateral josh canal. This was biopsied and results demonstrated a superficially invasive squamous cell carcinoma. An MRI was completed which was unable to clearly visualize any tumor or suspicious nodes.     She underwent EUA on 3/14/2018. This demonstrated a palpable nodule in the left lateral anal canal just above the dentate line and just slightly anterior to the mid anal line, measuring about 1 cm in diameter with slight associated mucosal abnormality surrounding it. The lesion was excised with the final mass measuring approximately 2 cm in diameter. The surgical pathology demonstrated poorly differentiated SCC, measuring 7 mm in  "greatest dimension with invasion into the anal sphincter muscle. The margins were negative, but tumor did extend to 1 mm from the deep margin. She was subsequently referred to our clinic and to medical oncology for discussion regarding treatment with chemoradiation.    On exam today, Ms. Yang reports feeling in her normal state of health. She had some mild blood in her stool following biopsy, but otherwise denies any GI symptoms including any diarrhea or constipation. She denies any pain with defecation. She continues to be short of breath, but this has not changed. She otherwise has no additional concerns or complaints and remainder of ROS is negative.     PHYSICAL EXAMINATION:  Vitals: /79  Pulse 67  Ht 1.6 m (5' 3\")  Wt 46.9 kg (103 lb 4.8 oz)  BMI 18.3 kg/m2  BMI= Body mass index is 18.3 kg/(m^2).  Gen: Alert. No acute distress.   Pulm: No wheezing, stridor or respiratory distress  CV:. Well-perfused, no cyanosis, no pedal edema  Musculoskeletal: Normal muscle bulk and tone  Rectal: Normal sphincter tone. 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      RECOMMENDATION: We recommend treatment with chemoradiation. We explained that while her disease is early stage, her pathology showed features that put her at high risk for recurrence, including high grade, invasion of the sphincter muscle, and close margins.We explained that the goal of chemoradiation is to reduce the risk of recurrence, while also sparing function of the anal sphincter. However, there is still a risk that treatment may lead to fecal incontinence.     We reviewed the process of, acute side effects, and other potential long term complications associated " with radiation therapy for anal cancer. The patient asked appropriate questions, which were answered to her satisfaction, and verbalized understanding. An informed consent was signed.      Ms. Yang was seen and assessed with my staff, Dr. Mike Morris MD  Resident, Radiation Oncology       I saw the patient with the resident.  I agree with the resident's note and plan of care.      HERO Mckeon M.D.  Department of Radiation Oncology  [facility]  CC  Patient Care Team:  Lisa Martinez DO as PCP - General (Family Practice)  Hitesh See MD as MD (Nephrology)  Liliana Renteria MD as MD (Oncology)    HPI  INITIAL PATIENT ASSESSMENT    Diagnosis: anal cancer    Prior radiation therapy:   Site Treated: chest  Facility: [facility]  Dates: 2014  Dose:     Prior chemotherapy: None    Prior hormonal therapy:Yes: 2004 HRT    Pain Eval:  Denies    Psychosocial  Living arrangements:   Fall Risk: independent   referral needs: Not needed    Advanced Directive: No  Implantable Cardiac Device? No    Onset of menarche: 16  LMP: No LMP recorded. Patient is postmenopausal.  Onset of menopause: 44  Abnormal vaginal bleeding/discharge: No  Are you pregnant? No  Reproductive note: 4 grown children    Nurse face-to-face time: Level 4:  15 min face to face time    Review of Systems   Constitutional: Positive for malaise/fatigue. Negative for chills, fever and weight loss.   HENT: Negative for congestion, hearing loss, sore throat and tinnitus.    Eyes: Negative for blurred vision.   Respiratory: Negative for cough, shortness of breath and wheezing.    Cardiovascular: Positive for leg swelling. Negative for chest pain.   Gastrointestinal: Positive for diarrhea. Negative for abdominal pain, blood in stool, constipation, heartburn, nausea and vomiting.   Genitourinary: Negative for dysuria, frequency and urgency.   Musculoskeletal: Negative for back pain and neck pain.    Skin: Negative for itching and rash.   Neurological: Negative for dizziness and headaches.   Endo/Heme/Allergies: Negative for environmental allergies.   Psychiatric/Behavioral: Negative for depression. The patient is not nervous/anxious.      Again, thank you for allowing me to participate in the care of your patient.      Sincerely,    Nasim Mckeon MD

## 2018-04-11 ENCOUNTER — TELEPHONE (OUTPATIENT)
Dept: TRANSPLANT | Facility: CLINIC | Age: 68
End: 2018-04-11

## 2018-04-11 NOTE — TELEPHONE ENCOUNTER
Pt states her oncologist is still coming up with treatment plan for her newly diagnosed anal cancer. Pt would like to discuss her immunosuppression meds with Dr. See at her upcoming appointment as her oncologist would like to take her off of them. Pt's current GFR was 40 as of 4/9/18. Staff msg sent to Dr. See about pt medication and how to proceed with wait list status.

## 2018-04-12 ENCOUNTER — HOSPITAL ENCOUNTER (OUTPATIENT)
Dept: PET IMAGING | Facility: CLINIC | Age: 68
Discharge: HOME | End: 2018-04-12
Attending: INTERNAL MEDICINE | Admitting: INTERNAL MEDICINE
Payer: COMMERCIAL

## 2018-04-12 DIAGNOSIS — C21.1 MALIGNANT NEOPLASM OF ANAL CANAL (H): ICD-10-CM

## 2018-04-12 LAB — GLUCOSE BLDC GLUCOMTR-MCNC: 131 MG/DL (ref 70–99)

## 2018-04-12 PROCEDURE — 74176 CT ABD & PELVIS W/O CONTRAST: CPT | Mod: PS,59

## 2018-04-12 PROCEDURE — 82962 GLUCOSE BLOOD TEST: CPT

## 2018-04-12 PROCEDURE — A9552 F18 FDG: HCPCS | Performed by: INTERNAL MEDICINE

## 2018-04-12 PROCEDURE — 34300033 ZZH RX 343: Performed by: INTERNAL MEDICINE

## 2018-04-12 RX ADMIN — FLUDEOXYGLUCOSE F-18 10.19 MCI.: 500 INJECTION, SOLUTION INTRAVENOUS at 13:35

## 2018-04-16 ENCOUNTER — TELEPHONE (OUTPATIENT)
Dept: ONCOLOGY | Facility: CLINIC | Age: 68
End: 2018-04-16

## 2018-04-16 NOTE — TELEPHONE ENCOUNTER
Maribel returned my call, and we discussed tumor conference recommendations.    I will see her in clinic next Monday to discuss Xeloda.    I also contacted Dr. Mckeon to let him know the plan.    I will go ahead and place orders for Xeloda so that insurance authorization can be sent.

## 2018-04-16 NOTE — TELEPHONE ENCOUNTER
Patient discussed at tumor conference today.  There is no further role for surgery-/re-excision.  The group recommends chemoradiation, but with her kidney transplant and co-morbidities, recommendation is for radiation with Xeloda, without mitomycin.    I will check with Dr. See, patient's nephrologist, if her immunosuppression could be decreased.  She has appointment with him next week.    She has already met with radiation oncology.    I would like to see patient back in clinic next Monday, 4/23/18, to discuss Xeloda.    I called patient, but there was no answer.  I left a non-detailed message, asking her to return my call.

## 2018-04-17 ENCOUNTER — HOSPITAL ENCOUNTER (OUTPATIENT)
Dept: MRI IMAGING | Facility: CLINIC | Age: 68
Discharge: HOME | End: 2018-04-17
Attending: INTERNAL MEDICINE | Admitting: INTERNAL MEDICINE
Payer: COMMERCIAL

## 2018-04-17 DIAGNOSIS — C21.1 MALIGNANT NEOPLASM OF ANAL CANAL (H): ICD-10-CM

## 2018-04-17 PROCEDURE — 25000128 H RX IP 250 OP 636: Performed by: INTERNAL MEDICINE

## 2018-04-17 PROCEDURE — A9585 GADOBUTROL INJECTION: HCPCS | Performed by: INTERNAL MEDICINE

## 2018-04-17 PROCEDURE — 72197 MRI PELVIS W/O & W/DYE: CPT

## 2018-04-17 RX ORDER — GADOBUTROL 604.72 MG/ML
7.5 INJECTION INTRAVENOUS ONCE
Status: COMPLETED | OUTPATIENT
Start: 2018-04-17 | End: 2018-04-17

## 2018-04-17 RX ADMIN — GADOBUTROL 5 ML: 604.72 INJECTION INTRAVENOUS at 11:42

## 2018-04-18 ENCOUNTER — TELEPHONE (OUTPATIENT)
Dept: NEPHROLOGY | Facility: CLINIC | Age: 68
End: 2018-04-18

## 2018-04-18 ENCOUNTER — CARE COORDINATION (OUTPATIENT)
Dept: ONCOLOGY | Facility: CLINIC | Age: 68
End: 2018-04-18

## 2018-04-18 DIAGNOSIS — C21.1 MALIGNANT NEOPLASM OF ANAL CANAL (H): Primary | ICD-10-CM

## 2018-04-18 NOTE — TELEPHONE ENCOUNTER
Left voicemail for patient to call back (follow up from last transplant appointment).    Val Hyde RN

## 2018-04-18 NOTE — PROGRESS NOTES
RN Care Coordination Note  Phone call to pt to introduce self and role of RN Care Coordinator at [facility].  Provided my contact information and explained that I have asked oral chemo team and oral chemo liaison to work on Xeloda coverage.  Pt confirms that she has CT sim appt with rad onc tomorrow and lab/Dr. Tucker appt on Monday 4/23. We will plan for oral chemo pharmacist to do Xeloda teaching on Monday and I instructed pt that she may get a call from our oral chemo liaison re: Xeloda insurance coverage in the interim.    Answered question regarding:  Pt would like to have Dr. See's pre-appt labs done on Monday 4/23 (OK) with Dr. Tucker's labs. Lab appt edited to reflect that we need CBC, BMP and pt will also do standing orders for Dr. See on 4/23.  Explained to pt that I will meet with her as well on 4/23 and will follow and provide coordination as needed.  Pt voiced understanding of above instructions and information and denied further questions today.    Kianna Dyer, RN, BSN, OCN  Care Coordinator  [facility]

## 2018-04-18 NOTE — TELEPHONE ENCOUNTER
Patient left a voicemail stating she'd just like to discuss return of her cancer, and how her medications would be adjusted. Will be seeing oncologist on Monday.    Val Hyde RN

## 2018-04-19 ENCOUNTER — ALLIED HEALTH/NURSE VISIT (OUTPATIENT)
Dept: RADIATION ONCOLOGY | Facility: CLINIC | Age: 68
End: 2018-04-19
Attending: RADIOLOGY
Payer: COMMERCIAL

## 2018-04-19 DIAGNOSIS — C21.1 MALIGNANT NEOPLASM OF ANAL CANAL (H): Primary | ICD-10-CM

## 2018-04-19 PROCEDURE — 77334 RADIATION TREATMENT AID(S): CPT | Performed by: RADIOLOGY

## 2018-04-19 PROCEDURE — 77470 SPECIAL RADIATION TREATMENT: CPT | Performed by: RADIOLOGY

## 2018-04-19 NOTE — PROGRESS NOTES
Radiation Therapy Patient Education    Person involved with teaching: Patient    Patient educational needs for self management of treatment-related side effects assessment completed.  [facility] Patient Ed tab contains Patient Learning Assessment    Education Materials Given  Radiation Therapy and You, Skin Care During Radiation Treatment, Coping with Fatigue and Coping with Diarrhea    Educational Topics Discussed  Side effects expected, Pain management, Skin care, Activity, Nutrition and weight loss and When to call MD/RN    Response To Teaching  Verbalizes understanding    GYN Only  Vaginal Dilator-given and educated: N/A    Referrals sent: None    Chemotherapy?  No  Pt reminded to come to treatment with full bladder
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
## 2018-04-19 NOTE — PROGRESS NOTES
A simulation was done for radiotherapy planning. Detailed technical note regarding this procedure is located in the [facility] record and verify system.

## 2018-04-20 ENCOUNTER — TELEPHONE (OUTPATIENT)
Dept: TRANSPLANT | Facility: CLINIC | Age: 68
End: 2018-04-20

## 2018-04-20 DIAGNOSIS — D84.9 IMMUNOSUPPRESSED STATUS (H): Primary | ICD-10-CM

## 2018-04-20 NOTE — TELEPHONE ENCOUNTER
Spoke with pt over the phone  Pt confirms dosages as per Dr. See's previous message  Educated pt about importance of q3 month lab checks  Pt plans to complete all transplant lab work Mon 4/23/18

## 2018-04-23 ENCOUNTER — CARE COORDINATION (OUTPATIENT)
Dept: ONCOLOGY | Facility: CLINIC | Age: 68
End: 2018-04-23

## 2018-04-23 ENCOUNTER — ONCOLOGY VISIT (OUTPATIENT)
Dept: ONCOLOGY | Facility: CLINIC | Age: 68
End: 2018-04-23
Attending: INTERNAL MEDICINE
Payer: COMMERCIAL

## 2018-04-23 ENCOUNTER — RESULTS ONLY (OUTPATIENT)
Dept: OTHER | Facility: CLINIC | Age: 68
End: 2018-04-23

## 2018-04-23 ENCOUNTER — ALLIED HEALTH/NURSE VISIT (OUTPATIENT)
Dept: ONCOLOGY | Facility: CLINIC | Age: 68
End: 2018-04-23

## 2018-04-23 ENCOUNTER — APPOINTMENT (OUTPATIENT)
Dept: LAB | Facility: CLINIC | Age: 68
End: 2018-04-23
Attending: INTERNAL MEDICINE
Payer: COMMERCIAL

## 2018-04-23 VITALS
BODY MASS INDEX: 18.85 KG/M2 | HEIGHT: 63 IN | OXYGEN SATURATION: 95 % | WEIGHT: 106.4 LBS | RESPIRATION RATE: 16 BRPM | HEART RATE: 65 BPM | SYSTOLIC BLOOD PRESSURE: 165 MMHG | DIASTOLIC BLOOD PRESSURE: 80 MMHG | TEMPERATURE: 98.3 F

## 2018-04-23 DIAGNOSIS — C21.1 MALIGNANT NEOPLASM OF ANAL CANAL (H): Primary | ICD-10-CM

## 2018-04-23 DIAGNOSIS — D84.9 IMMUNOSUPPRESSED STATUS (H): ICD-10-CM

## 2018-04-23 DIAGNOSIS — C21.1 MALIGNANT NEOPLASM OF ANAL CANAL (H): ICD-10-CM

## 2018-04-23 DIAGNOSIS — Z71.9 VISIT FOR COUNSELING: Primary | ICD-10-CM

## 2018-04-23 LAB
ANION GAP SERPL CALCULATED.3IONS-SCNC: 8 MMOL/L (ref 3–14)
BASOPHILS # BLD AUTO: 0 10E9/L (ref 0–0.2)
BASOPHILS NFR BLD AUTO: 0.6 %
BUN SERPL-MCNC: 27 MG/DL (ref 7–30)
CALCIUM SERPL-MCNC: 9.1 MG/DL (ref 8.5–10.1)
CHLORIDE SERPL-SCNC: 106 MMOL/L (ref 94–109)
CO2 SERPL-SCNC: 24 MMOL/L (ref 20–32)
CREAT SERPL-MCNC: 1.42 MG/DL (ref 0.52–1.04)
DIFFERENTIAL METHOD BLD: ABNORMAL
EOSINOPHIL # BLD AUTO: 0.2 10E9/L (ref 0–0.7)
EOSINOPHIL NFR BLD AUTO: 3.8 %
ERYTHROCYTE [DISTWIDTH] IN BLOOD BY AUTOMATED COUNT: 13.9 % (ref 10–15)
GFR SERPL CREATININE-BSD FRML MDRD: 37 ML/MIN/1.7M2
GLUCOSE SERPL-MCNC: 113 MG/DL (ref 70–99)
HCT VFR BLD AUTO: 39.9 % (ref 35–47)
HGB BLD-MCNC: 13.1 G/DL (ref 11.7–15.7)
IMM GRANULOCYTES # BLD: 0 10E9/L (ref 0–0.4)
IMM GRANULOCYTES NFR BLD: 0.6 %
LYMPHOCYTES # BLD AUTO: 0.7 10E9/L (ref 0.8–5.3)
LYMPHOCYTES NFR BLD AUTO: 13.8 %
MCH RBC QN AUTO: 28.5 PG (ref 26.5–33)
MCHC RBC AUTO-ENTMCNC: 32.8 G/DL (ref 31.5–36.5)
MCV RBC AUTO: 87 FL (ref 78–100)
MONOCYTES # BLD AUTO: 0.6 10E9/L (ref 0–1.3)
MONOCYTES NFR BLD AUTO: 12.2 %
NEUTROPHILS # BLD AUTO: 3.5 10E9/L (ref 1.6–8.3)
NEUTROPHILS NFR BLD AUTO: 69 %
NRBC # BLD AUTO: 0 10*3/UL
NRBC BLD AUTO-RTO: 0 /100
PLATELET # BLD AUTO: 242 10E9/L (ref 150–450)
POTASSIUM SERPL-SCNC: 3.4 MMOL/L (ref 3.4–5.3)
RBC # BLD AUTO: 4.6 10E12/L (ref 3.8–5.2)
SIROLIMUS BLD-MCNC: 4.4 UG/L (ref 5–15)
SODIUM SERPL-SCNC: 138 MMOL/L (ref 133–144)
TME LAST DOSE: ABNORMAL H
WBC # BLD AUTO: 5 10E9/L (ref 4–11)

## 2018-04-23 PROCEDURE — G0463 HOSPITAL OUTPT CLINIC VISIT: HCPCS | Mod: ZF

## 2018-04-23 PROCEDURE — 85025 COMPLETE CBC W/AUTO DIFF WBC: CPT | Performed by: INTERNAL MEDICINE

## 2018-04-23 PROCEDURE — 80048 BASIC METABOLIC PNL TOTAL CA: CPT | Performed by: INTERNAL MEDICINE

## 2018-04-23 PROCEDURE — 80195 ASSAY OF SIROLIMUS: CPT | Performed by: INTERNAL MEDICINE

## 2018-04-23 PROCEDURE — 80180 DRUG SCRN QUAN MYCOPHENOLATE: CPT | Performed by: INTERNAL MEDICINE

## 2018-04-23 PROCEDURE — 81370 HLA I & II TYPING LR: CPT | Performed by: THORACIC SURGERY (CARDIOTHORACIC VASCULAR SURGERY)

## 2018-04-23 PROCEDURE — 00000211 ZZHCL STATISTIC MYCOPHENOLIC ACID AUC: Performed by: INTERNAL MEDICINE

## 2018-04-23 PROCEDURE — 81376 HLA II TYPING 1 LOCUS LR: CPT | Mod: XU | Performed by: THORACIC SURGERY (CARDIOTHORACIC VASCULAR SURGERY)

## 2018-04-23 PROCEDURE — 99215 OFFICE O/P EST HI 40 MIN: CPT | Mod: ZP | Performed by: INTERNAL MEDICINE

## 2018-04-23 PROCEDURE — 36415 COLL VENOUS BLD VENIPUNCTURE: CPT

## 2018-04-23 RX ORDER — CAPECITABINE 500 MG/1
TABLET, FILM COATED ORAL
Qty: 84 TABLET | Refills: 0 | Status: SHIPPED | OUTPATIENT
Start: 2018-04-23 | End: 2018-08-07

## 2018-04-23 RX ORDER — PROCHLORPERAZINE MALEATE 10 MG
10 TABLET ORAL EVERY 6 HOURS PRN
Qty: 30 TABLET | Refills: 2 | Status: SHIPPED | OUTPATIENT
Start: 2018-04-23 | End: 2018-09-04

## 2018-04-23 ASSESSMENT — PAIN SCALES - GENERAL: PAINLEVEL: NO PAIN (0)

## 2018-04-23 NOTE — PROGRESS NOTES
"Oral Chemotherapy Monitoring Program  [place]    Primary Oncologist: Dr. Tucker  Primary Oncology Clinic: [facility]  Cancer Diagnosis: Colorectal    Drug: Xeloda + XRT  Start Date: Patient reports 4/27 is day 1 of XRT  Expected duration of therapy: Until disease progression or unacceptable toxicity    Drug Interaction Assessment: None    Subjective/Objective:  Maribel Yang is a 67 year old female seen in clinic for an initial visit for oral chemotherapy education.      No flowsheet data found.    Vitals:  BP:   BP Readings from Last 1 Encounters:   04/23/18 165/80     Wt Readings from Last 1 Encounters:   04/23/18 48.3 kg (106 lb 6.4 oz)     Estimated body surface area is 1.47 meters squared as calculated from the following:    Height as of an earlier encounter on 4/23/18: 1.6 m (5' 2.99\").    Weight as of an earlier encounter on 4/23/18: 48.3 kg (106 lb 6.4 oz).      Labs:  Lab Results   Component Value Date     04/23/2018      Lab Results   Component Value Date    POTASSIUM 3.4 04/23/2018     Lab Results   Component Value Date    KAYKAY 9.1 04/23/2018     Lab Results   Component Value Date    MAG 2.1 12/15/2009     Lab Results   Component Value Date    PHOS 3.4 06/01/2009     Lab Results   Component Value Date    ALBUMIN 3.1 04/09/2018     Lab Results   Component Value Date    BUN 27 04/23/2018     Lab Results   Component Value Date    CR 1.42 04/23/2018       Lab Results   Component Value Date    AST 12 04/09/2018     Lab Results   Component Value Date    ALT 13 04/09/2018     Lab Results   Component Value Date    BILITOTAL 0.2 04/09/2018       Lab Results   Component Value Date    WBC 5.0 04/23/2018     Lab Results   Component Value Date    HGB 13.1 04/23/2018     Lab Results   Component Value Date     04/23/2018     Lab Results   Component Value Date    ANEU 3.5 04/23/2018         Assessment/Plan:  Patient is appropriate to start therapy.    Basic chemotherapy teaching was reviewed with the " patient including indication, start date of therapy, dose, administration, adverse effects, missed doses, food and drug interactions, monitoring, side effect management, office contact information, and safe handling. Written materials were provided and all questions answered.    Follow-Up:  In 2 weeks     Elizabeth Stanley, Faviola, BCPS, [facility]  Hematology/Oncology Clinical Pharmacist  [facility]  [email or web address]

## 2018-04-23 NOTE — PROGRESS NOTES
RN Care Coordination Note  Met with pt after SW and Oral Chemo team pharmacist. Planning on picking up Xeloda today here at [facility], starting chemoRT on date TBD by rad onc team  Pt  verbalizes understanding of Dr. Tucker's plan of care and denies questions or barriers to care today, except financial concerns that she has discussed with SW  Introduced self and role of RN Care Coordinator at [facility].  Provided my contact information, [facility] Nurse Line (available 24/7) and [facility] Scheduling Line.    Pt voiced understanding and appreciation of above information and denies any further questions, and he/she understands that I will follow and provide coordination as needed.  Kianna Dyer, RN, BSN, OCN  Care Coordinator  [facility]
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
## 2018-04-23 NOTE — NURSING NOTE
"Oncology Rooming Note    April 23, 2018 9:17 AM   Maribel Yang is a 67 year old female who presents for:    Chief Complaint   Patient presents with     Blood Draw     Labs drawn from t by RN. Vs taken - notified provider of elevated BP. Pt checked in for appt     Oncology Clinic Visit     Return visit related to Anal Cancer     Initial Vitals: /80 (BP Location: Left arm, Patient Position: Sitting, Cuff Size: Adult Regular)  Pulse 65  Temp 98.3  F (36.8  C) (Oral)  Resp 16  Ht 1.6 m (5' 2.99\")  Wt 48.3 kg (106 lb 6.4 oz)  SpO2 95%  BMI 18.85 kg/m2 Estimated body mass index is 18.85 kg/(m^2) as calculated from the following:    Height as of this encounter: 1.6 m (5' 2.99\").    Weight as of this encounter: 48.3 kg (106 lb 6.4 oz). Body surface area is 1.47 meters squared.  No Pain (0) Comment: Data Unavailable   No LMP recorded. Patient is postmenopausal.  Allergies reviewed: Yes  Medications reviewed: Yes    Medications: Medication refills not needed today.  Pharmacy name entered into [employer]:    [facility] - [place], [place] - [facility] [address]    Clinical concerns: No new concerns. Provider was notified.    10 minutes for nursing intake (face to face time)     Gabriella Cox LPN            "

## 2018-04-23 NOTE — NURSING NOTE
Chief Complaint   Patient presents with     Blood Draw     Labs drawn from vpt by RN. Vs taken - notified provider of elevated BP. Pt checked in for appt     Labs collected from venipuncture by RN. Vitals taken - notified provider of elevated BP. Checked in for appointment(s).    Jackie Hernandez RN
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
## 2018-04-24 ENCOUNTER — RESULTS ONLY (OUTPATIENT)
Dept: OTHER | Facility: CLINIC | Age: 68
End: 2018-04-24

## 2018-04-24 ENCOUNTER — OFFICE VISIT (OUTPATIENT)
Dept: NEPHROLOGY | Facility: CLINIC | Age: 68
End: 2018-04-24
Attending: INTERNAL MEDICINE
Payer: COMMERCIAL

## 2018-04-24 VITALS
WEIGHT: 101.2 LBS | DIASTOLIC BLOOD PRESSURE: 74 MMHG | TEMPERATURE: 98.1 F | OXYGEN SATURATION: 96 % | BODY MASS INDEX: 17.93 KG/M2 | SYSTOLIC BLOOD PRESSURE: 137 MMHG | HEIGHT: 63 IN | HEART RATE: 81 BPM

## 2018-04-24 DIAGNOSIS — D84.9 IMMUNOSUPPRESSED STATUS (H): ICD-10-CM

## 2018-04-24 DIAGNOSIS — Z94.0 KIDNEY REPLACED BY TRANSPLANT: ICD-10-CM

## 2018-04-24 DIAGNOSIS — I15.1 HYPERTENSION SECONDARY TO OTHER RENAL DISORDERS: ICD-10-CM

## 2018-04-24 DIAGNOSIS — R53.83 OTHER FATIGUE: ICD-10-CM

## 2018-04-24 DIAGNOSIS — Z94.0 KIDNEY REPLACED BY TRANSPLANT: Primary | ICD-10-CM

## 2018-04-24 DIAGNOSIS — Z48.298 AFTERCARE FOLLOWING ORGAN TRANSPLANT: ICD-10-CM

## 2018-04-24 LAB
CREAT UR-MCNC: 103 MG/DL
PROT UR-MCNC: 1.07 G/L
PROT/CREAT 24H UR: 1.04 G/G CR (ref 0–0.2)

## 2018-04-24 PROCEDURE — G0463 HOSPITAL OUTPT CLINIC VISIT: HCPCS | Mod: ZF

## 2018-04-24 PROCEDURE — 86832 HLA CLASS I HIGH DEFIN QUAL: CPT | Performed by: THORACIC SURGERY (CARDIOTHORACIC VASCULAR SURGERY)

## 2018-04-24 PROCEDURE — 87799 DETECT AGENT NOS DNA QUANT: CPT | Performed by: INTERNAL MEDICINE

## 2018-04-24 PROCEDURE — 84156 ASSAY OF PROTEIN URINE: CPT | Performed by: INTERNAL MEDICINE

## 2018-04-24 PROCEDURE — 86833 HLA CLASS II HIGH DEFIN QUAL: CPT | Performed by: THORACIC SURGERY (CARDIOTHORACIC VASCULAR SURGERY)

## 2018-04-24 PROCEDURE — 36415 COLL VENOUS BLD VENIPUNCTURE: CPT | Performed by: INTERNAL MEDICINE

## 2018-04-24 ASSESSMENT — PAIN SCALES - GENERAL: PAINLEVEL: NO PAIN (0)
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
## 2018-04-24 NOTE — NURSING NOTE
"Chief Complaint   Patient presents with     RECHECK     kidney Tx       Initial /74  Pulse 81  Temp 98.1  F (36.7  C) (Oral)  Ht 1.6 m (5' 2.99\")  Wt 45.9 kg (101 lb 3.2 oz)  SpO2 96%  BMI 17.93 kg/m2 Estimated body mass index is 17.93 kg/(m^2) as calculated from the following:    Height as of this encounter: 1.6 m (5' 2.99\").    Weight as of this encounter: 45.9 kg (101 lb 3.2 oz).  Medication Reconciliation: complete     Ramon Barone MA    "
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
## 2018-04-25 ENCOUNTER — TELEPHONE (OUTPATIENT)
Dept: TRANSPLANT | Facility: CLINIC | Age: 68
End: 2018-04-25

## 2018-04-25 LAB
CMV DNA SPEC NAA+PROBE-ACNC: NORMAL [IU]/ML
CMV DNA SPEC NAA+PROBE-LOG#: NORMAL {LOG_IU}/ML
MYCOPHENOLATE SERPL LC/MS/MS-MCNC: 0.6 MG/L (ref 1–3.5)
MYCOPHENOLATE-G SERPL LC/MS/MS-MCNC: 28.8 MG/L (ref 30–95)
PRA DONOR SPECIFIC ABY: NORMAL
SPECIMEN SOURCE: NORMAL
TME LAST DOSE: ABNORMAL H

## 2018-04-25 NOTE — TELEPHONE ENCOUNTER
Spoke with pt, states she had office visit with Dr. See yesterday. Awaiting pending labs and progress note from Dr. See.

## 2018-04-27 ENCOUNTER — APPOINTMENT (OUTPATIENT)
Dept: LAB | Facility: CLINIC | Age: 68
End: 2018-04-27
Attending: INTERNAL MEDICINE
Payer: COMMERCIAL

## 2018-04-27 LAB
EBV DNA # SPEC NAA+PROBE: 8233 {COPIES}/ML
EBV DNA SPEC NAA+PROBE-LOG#: 3.9 {LOG_COPIES}/ML

## 2018-04-27 PROCEDURE — 81370 HLA I & II TYPING LR: CPT | Performed by: INTERNAL MEDICINE

## 2018-04-27 PROCEDURE — 81376 HLA II TYPING 1 LOCUS LR: CPT | Mod: 91 | Performed by: INTERNAL MEDICINE

## 2018-04-27 NOTE — PROGRESS NOTES
Social Work Follow-Up Encounter Visit  Oncology Clinic    Data/Intervention:  Patient Name:  Maribel Yang  /Age:  1950 (67 year old)    Reason for Follow-Up:  [employer] [facility]     Collaborated With:    -patient     Patient requested to meet with SW during infusion.  Patient had obtained applications for [employer] and [employer] and requested provider referrals be completed at this time.  SW submitted both AF and OA applications for patient. No other needs reported.    Resources Provided:  AF application completed  OA application completed    Assessment:  Patient appears to be planning for needs appropriately.    Plan:  SW provided patient/family with writer's contact information and availability.   SW continues to be available to assist with any other identified needs.     Soo Yeon Han, MSW, LICSW  Pager: [phone]  Phone: [phone]

## 2018-04-30 ENCOUNTER — APPOINTMENT (OUTPATIENT)
Dept: RADIATION ONCOLOGY | Facility: CLINIC | Age: 68
End: 2018-04-30
Attending: RADIOLOGY
Payer: COMMERCIAL

## 2018-04-30 DIAGNOSIS — N18.6 END STAGE RENAL DISEASE (H): ICD-10-CM

## 2018-04-30 DIAGNOSIS — Z76.82 ORGAN TRANSPLANT CANDIDATE: Primary | ICD-10-CM

## 2018-04-30 PROCEDURE — 77386 ZZH IMRT TREATMENT DELIVERY, COMPLEX: CPT | Performed by: RADIOLOGY

## 2018-05-01 ENCOUNTER — APPOINTMENT (OUTPATIENT)
Dept: RADIATION ONCOLOGY | Facility: CLINIC | Age: 68
End: 2018-05-01
Attending: RADIOLOGY
Payer: COMMERCIAL

## 2018-05-01 LAB
A* LOCUS NMDP: NORMAL
A* LOCUS: NORMAL
A* NMDP: NORMAL
A*: NORMAL
ABTEST METHOD: NORMAL
B* LOCUS NMDP: NORMAL
B* LOCUS: NORMAL
B* NMDP: NORMAL
BW-1: NORMAL
C* LOCUS NMDP: NORMAL
C* LOCUS: NORMAL
C* NMDP: NORMAL
DONOR IDENTIFICATION: NORMAL
DPA1* LOCUS NMDP: NORMAL
DPA1* NMDP: NORMAL
DPA1*: NORMAL
DPA1*LOCUS: NORMAL
DPB1* LOCUS NMDP: NORMAL
DPB1* NMDP: NORMAL
DPB1*: NORMAL
DPB1*LOCUS: NORMAL
DQA1*LOCUS NMDP: NORMAL
DQA1*LOCUS: NORMAL
DQA1*NMDP: NORMAL
DQB1* LOCUS NMDP: NORMAL
DQB1* LOCUS: NORMAL
DQB1* NMDP: NORMAL
DQB1*: NORMAL
DRB1* LOCUS NMDP: NORMAL
DRB1* LOCUS: NORMAL
DRB1* NMDP: NORMAL
DRB1*: NORMAL
DRB5* LOCUS NMDP: NORMAL
DRB5* LOCUS: NORMAL
DRB5* NMDP: NORMAL
DRSSO TEST METHOD: NORMAL
DSA COMMENTS: NORMAL
DSA PRESENT: NO
DSA TEST METHOD: NORMAL
HLA TYPING COMPLETE SOT RECIPIENT: NORMAL
ORGAN: NORMAL
SA1 CELL: NORMAL
SA1 COMMENTS: NORMAL
SA1 HI RISK ABY: NORMAL
SA1 MOD RISK ABY: NORMAL
SA1 TEST METHOD: NORMAL
SA2 CELL: NORMAL
SA2 COMMENTS: NORMAL
SA2 HI RISK ABY UA: NORMAL
SA2 MOD RISK ABY: NORMAL
SA2 TEST METHOD: NORMAL
UNOS CPRA: 95

## 2018-05-01 PROCEDURE — 77386 ZZH IMRT TREATMENT DELIVERY, COMPLEX: CPT | Performed by: RADIOLOGY

## 2018-05-02 ENCOUNTER — APPOINTMENT (OUTPATIENT)
Dept: RADIATION ONCOLOGY | Facility: CLINIC | Age: 68
End: 2018-05-02
Attending: RADIOLOGY
Payer: COMMERCIAL

## 2018-05-02 PROCEDURE — 77386 ZZH IMRT TREATMENT DELIVERY, COMPLEX: CPT | Performed by: RADIOLOGY

## 2018-05-03 ENCOUNTER — APPOINTMENT (OUTPATIENT)
Dept: RADIATION ONCOLOGY | Facility: CLINIC | Age: 68
End: 2018-05-03
Attending: RADIOLOGY
Payer: COMMERCIAL

## 2018-05-03 ENCOUNTER — TELEPHONE (OUTPATIENT)
Dept: TRANSPLANT | Facility: CLINIC | Age: 68
End: 2018-05-03

## 2018-05-03 VITALS — WEIGHT: 102.5 LBS | BODY MASS INDEX: 18.16 KG/M2

## 2018-05-03 DIAGNOSIS — Z94.0 KIDNEY REPLACED BY TRANSPLANT: ICD-10-CM

## 2018-05-03 DIAGNOSIS — C21.1 MALIGNANT NEOPLASM OF ANAL CANAL (H): Primary | ICD-10-CM

## 2018-05-03 PROCEDURE — 77386 ZZH IMRT TREATMENT DELIVERY, COMPLEX: CPT | Performed by: RADIOLOGY

## 2018-05-03 NOTE — LETTER
5/3/2018       RE: Maribel Yang  [address]  [facility] [address]     Dear Colleague,    Thank you for referring your patient, Maribel Yang, to the RADIATION ONCOLOGY CLINIC. Please see a copy of my visit note below.    WEEKLY MANAGEMENT NOTE  Radiation Oncology          Patient Name: Maribel Yang  MRN: [id number]       Pelvis  720 cGy / 5040 cGy   4/28         DAILY DOSE:     180  cGy/ day,  5 times/week  Tomotherapy    DISEASE UNDER TREATMENT: Poorly differentiated  squamous cell cancer of anal canal.[id number]. S/p complete excision.     CHEMOTHERAPY: Xeloda    CTC V4.0 Toxicity Criteria  Fatigue: Grade 1: Fatigue relieved by rest  Pain Score:  0/10     :   Urinary Frequency/Urgency: Grade 0: No change from baseline  Urinary Incontinence: Grade 0: No change from baseline  Dysuria:Grade 0: No change from baseline  Comment:    GI:  Diarrhea:Grade 0  No change over baseline  Proctitis: Grade 0 No symptom  Comment: Perineum without skin changes      OBJECTIVE:  Wt Readings from Last 2 Encounters:   05/03/18 46.5 kg (102 lb 8 oz)   04/24/18 45.9 kg (101 lb 3.2 oz)       IMPRESSION: The patient is tolerating the treatment.  The patient set up, dose, and MVCT images were reviewed.      PLAN: Continue radiotherapy    PAIN MANAGEMENT PLAN: The patient does not require pain management    HERO Mckeon M.D.  Department of Radiation Oncology  [facility]
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
## 2018-05-03 NOTE — TELEPHONE ENCOUNTER
Hitesh See MD [place], Angela, RN                     New, low level EBV viremia and recommend stopping mycophenolic acid.       Call placed to pt.   Informed her to stop taking myfortic per Dr. See, to review today in meeting, and to f/u on lab schedule.   Order updated.

## 2018-05-03 NOTE — TELEPHONE ENCOUNTER
Post Kidney and Pancreas Transplant Team Conference  Date: 5/3/2018  Transplant Coordinator: Radha Cooper     Attendees:    []  Dr. See  [] Zoya Damian, AVERY  [x] Marguerite Huber LPN    [x]  Dr. Fuller  [] Chandni Fulton RN  [] Monika Langston LPN  []  Dr. Sanders   [] Dinorah Scott, RN  []  Dr. Chacko   [x] Adelaide Kay RN  [] Dr. Eckert  [x] Sayda Young RN  [] Dr. Long   [] Torey Heath RN  [] Dr. Cuevas [x] Radha Cooper, RN  [] Surgery Fellow [] Citlali Bansal RN  [] Dana Obrien NP    Verbal Plan Read Back:   No changes at this time.    Routed to RN Coordinator   Marguerite Huber

## 2018-05-03 NOTE — PROGRESS NOTES
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 S/p complete excision.     CHEMOTHERAPY: Xeloda    CTC V4.0 Toxicity Criteria  Fatigue: Grade 1: Fatigue relieved by rest  Pain Score:  0/10     :   Urinary Frequency/Urgency: Grade 0: No change from baseline  Urinary Incontinence: Grade 0: No change from baseline  Dysuria:Grade 0: No change from baseline  Comment:    GI:  Diarrhea:Grade 0  No change over baseline  Proctitis: Grade 0 No symptom  Comment: Perineum without skin changes      OBJECTIVE:  Wt Readings from Last 2 Encounters:   05/03/18 46.5 kg (102 lb 8 oz)   04/24/18 45.9 kg (101 lb 3.2 oz)       IMPRESSION: The patient is tolerating the treatment.  The patient set up, dose, and MVCT images were reviewed.      PLAN: Continue radiotherapy    PAIN MANAGEMENT PLAN: The patient does not require pain management    HERO Mckeon M.D.  Department of Radiation Oncology  [facility]

## 2018-05-04 ENCOUNTER — APPOINTMENT (OUTPATIENT)
Dept: RADIATION ONCOLOGY | Facility: CLINIC | Age: 68
End: 2018-05-04
Attending: RADIOLOGY
Payer: COMMERCIAL

## 2018-05-04 PROCEDURE — 77386 ZZH IMRT TREATMENT DELIVERY, COMPLEX: CPT | Performed by: RADIOLOGY

## 2018-05-04 PROCEDURE — 77336 RADIATION PHYSICS CONSULT: CPT | Performed by: RADIOLOGY

## 2018-05-05 PROBLEM — Z48.298 AFTERCARE FOLLOWING ORGAN TRANSPLANT: Status: ACTIVE | Noted: 2018-05-05
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
## 2018-05-07 ENCOUNTER — APPOINTMENT (OUTPATIENT)
Dept: RADIATION ONCOLOGY | Facility: CLINIC | Age: 68
End: 2018-05-07
Attending: RADIOLOGY
Payer: COMMERCIAL

## 2018-05-07 DIAGNOSIS — Z79.899 ON STATIN THERAPY: Primary | ICD-10-CM

## 2018-05-07 PROCEDURE — 77386 ZZH IMRT TREATMENT DELIVERY, COMPLEX: CPT | Performed by: RADIOLOGY

## 2018-05-08 ENCOUNTER — APPOINTMENT (OUTPATIENT)
Dept: RADIATION ONCOLOGY | Facility: CLINIC | Age: 68
End: 2018-05-08
Attending: RADIOLOGY
Payer: COMMERCIAL

## 2018-05-08 PROCEDURE — 77386 ZZH IMRT TREATMENT DELIVERY, COMPLEX: CPT | Performed by: RADIOLOGY

## 2018-05-09 ENCOUNTER — TELEPHONE (OUTPATIENT)
Dept: ONCOLOGY | Facility: CLINIC | Age: 68
End: 2018-05-09

## 2018-05-09 ENCOUNTER — APPOINTMENT (OUTPATIENT)
Dept: RADIATION ONCOLOGY | Facility: CLINIC | Age: 68
End: 2018-05-09
Attending: RADIOLOGY
Payer: COMMERCIAL

## 2018-05-09 PROCEDURE — 77386 ZZH IMRT TREATMENT DELIVERY, COMPLEX: CPT | Performed by: RADIOLOGY

## 2018-05-09 NOTE — TELEPHONE ENCOUNTER
Oral Chemotherapy Monitoring Program    Primary Oncologist: Dr. Tucker  Primary Oncology Clinic: [facility]  Cancer Diagnosis: Colorectal    Therapy History:  Starting 4/30: Concurrent with XRT, Xeloda 1000mg (2 tablets) QAM and 500mg (1 tablet) QPM MON-FRI with water, within 30 minutes of a meal    Drug Interaction Assessment: No known new interactions    Lab Monitoring Plan  Monitoring plan for the 5 days per week x 5-6 weeks with XRT:   C1D1+   CMP, CBC C1D15+  C1D29+ call         C1D8+ Call C1D22+ Call, CMP, CBC C1D36+            Monitoring plan for the 5 days per week x 5-6 weeks with XRT: CBC Q3 weeks, CMP Q3 weeks     Subjective/Objective:  Maribel Yang is a 67 year old female contacted by phone for a follow-up visit for oral chemotherapy.  Maribel is tolerating Xeloda therapy well. She began therapy 4/30. Maribel reports grade 1 nausea, starting today. She took her compazine for the first time this morning so she will see how it works for her. Maribel also reports grade 2 fatigue that is not relieved by rest. She believes this started a little over a week ago. She says her sleep at night has not been great because it's been hot. Additionally Maribel has recently been told by her nephrologist that she has low level EBV viremia which may also be contributing to her fatigue. Maribel reports drinking 64-80 ounces of water per day (4-5 16oz bottles). She denies vomiting, constipation, mouth sores, edema, and skin changes.      ORAL CHEMOTHERAPY 5/9/2018   Drug Name Xeloda (Capecitabine)   Current Dosage Other   Current Schedule Other   Cycle Details M-F only during XRT   Start Date of Last Cycle 4/30/2018   Doses missed in last 2 weeks 0   Adherence Assessment Adherent   Adverse Effects Fatigue;Nausea   Nausea Grade 1   Pharmacist Intervention(nausea) Yes   Intervention(s) Patient education   Fatigue Grade 2   Pharmacist Intervention(fatigue) Yes   Intervention(s) Patient education   Is the dose as  "ordered appropriate for the patient? Yes       Vitals:  BP:   BP Readings from Last 1 Encounters:   04/24/18 137/74     Wt Readings from Last 1 Encounters:   05/03/18 46.5 kg (102 lb 8 oz)     Estimated body surface area is 1.44 meters squared as calculated from the following:    Height as of 4/24/18: 1.6 m (5' 2.99\").    Weight as of 5/3/18: 46.5 kg (102 lb 8 oz).    Labs:  _  Result Component Current Result Ref Range   Sodium 138 (4/23/2018) 133 - 144 mmol/L     _  Result Component Current Result Ref Range   Potassium 3.4 (4/23/2018) 3.4 - 5.3 mmol/L     _  Result Component Current Result Ref Range   Calcium 9.1 (4/23/2018) 8.5 - 10.1 mg/dL     No results found for Mag within last 30 days.     No results found for Phos within last 30 days.     No results found for ALBUMIN within last 30 days.     _  Result Component Current Result Ref Range   Urea Nitrogen 27 (4/23/2018) 7 - 30 mg/dL     _  Result Component Current Result Ref Range   Creatinine 1.42 (H) (4/23/2018) 0.52 - 1.04 mg/dL       No results found for AST within last 30 days.     No results found for ALT within last 30 days.     No results found for BILITOTAL within last 30 days.       _  Result Component Current Result Ref Range   WBC 5.0 (4/23/2018) 4.0 - 11.0 10e9/L     _  Result Component Current Result Ref Range   Hemoglobin 13.1 (4/23/2018) 11.7 - 15.7 g/dL     _  Result Component Current Result Ref Range   Platelet Count 242 (4/23/2018) 150 - 450 10e9/L     _  Result Component Current Result Ref Range   Absolute Neutrophil 3.5 (4/23/2018) 1.6 - 8.3 10e9/L     Next labs 5/14    Assessment:  Maribel is tolerating Xeloda therapy well. She reports grade 1 nausea and grade 2 fatigue.  She was recently found to have low level EBV viremia per her nephrologist.    Plan:  Continue Xeloda therapy as planned.    Follow-Up:  Review labs and note from Dr. Tucker appt 5/14    Refill Due:  Patient can get remainder of fill around 5/21 (pharmacy could only fill 28 " day supply on 4/23)    Julissa Browning  Pharmacy Intern   [facility]  Oral Chemotherapy Monitoring Program  [phone]

## 2018-05-09 NOTE — TELEPHONE ENCOUNTER
Oral Chemotherapy Monitoring Program    Placed call to Maribel for initial adherence and side effect assessment of Xeloda therapy concurrent with XRT. No answer. Left message requesting patient call back at [phone] in follow up to therapy.  No patient or medication names were mentioned in this message.    Julissa Browning  Pharmacy Intern   [facility]  Oral Chemotherapy Monitoring Program  [phone]

## 2018-05-10 ENCOUNTER — OFFICE VISIT (OUTPATIENT)
Dept: RADIATION ONCOLOGY | Facility: CLINIC | Age: 68
End: 2018-05-10
Attending: RADIOLOGY
Payer: COMMERCIAL

## 2018-05-10 DIAGNOSIS — C21.1 MALIGNANT NEOPLASM OF ANAL CANAL (H): Primary | ICD-10-CM

## 2018-05-10 PROCEDURE — 77386 ZZH IMRT TREATMENT DELIVERY, COMPLEX: CPT | Performed by: RADIOLOGY
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
## 2018-05-10 NOTE — LETTER
5/10/2018       RE: Maribel Yang  [address]  [facility] [address]     Dear Colleague,    Thank you for referring your patient, Maribel Yang, to the RADIATION ONCOLOGY CLINIC. Please see a copy of my visit note below.    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 S/p complete excision.     CHEMOTHERAPY: Xeloda    CTC V4.0 Toxicity Criteria  Fatigue: Grade 1: Fatigue relieved by rest  Pain Score:  0/10     :   Urinary Frequency/Urgency: Grade 0: No change from baseline  Urinary Incontinence: Grade 0: No change from baseline  Dysuria:Grade 0: No change from baseline  Comment:    GI:  Diarrhea:Grade 0  No change over baseline  Proctitis: Grade 0 No symptom  Comment: Perineum without skin changes      OBJECTIVE:  Skin reaction grade 1    IMPRESSION: The patient is tolerating the treatment.  The patient set up, dose, and MVCT images were reviewed.      PLAN: Continue radiotherapy    PAIN MANAGEMENT PLAN: The patient does not require pain management    HERO Mckeon M.D.  Department of Radiation Oncology  [facility]

## 2018-05-10 NOTE — PROGRESS NOTES
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 S/p complete excision.     CHEMOTHERAPY: Xeloda    CTC V4.0 Toxicity Criteria  Fatigue: Grade 1: Fatigue relieved by rest  Pain Score:  0/10     :   Urinary Frequency/Urgency: Grade 0: No change from baseline  Urinary Incontinence: Grade 0: No change from baseline  Dysuria:Grade 0: No change from baseline  Comment:    GI:  Diarrhea:Grade 0  No change over baseline  Proctitis: Grade 0 No symptom  Comment: Perineum without skin changes      OBJECTIVE:  Skin reaction grade 1    IMPRESSION: The patient is tolerating the treatment.  The patient set up, dose, and MVCT images were reviewed.      PLAN: Continue radiotherapy    PAIN MANAGEMENT PLAN: The patient does not require pain management    HERO Mckeon M.D.  Department of Radiation Oncology  [facility]

## 2018-05-11 ENCOUNTER — APPOINTMENT (OUTPATIENT)
Dept: RADIATION ONCOLOGY | Facility: CLINIC | Age: 68
End: 2018-05-11
Attending: RADIOLOGY
Payer: COMMERCIAL

## 2018-05-11 PROCEDURE — 77336 RADIATION PHYSICS CONSULT: CPT | Performed by: RADIOLOGY

## 2018-05-11 PROCEDURE — 77386 ZZH IMRT TREATMENT DELIVERY, COMPLEX: CPT | Performed by: RADIOLOGY

## 2018-05-14 ENCOUNTER — ONCOLOGY VISIT (OUTPATIENT)
Dept: ONCOLOGY | Facility: CLINIC | Age: 68
End: 2018-05-14
Attending: INTERNAL MEDICINE
Payer: COMMERCIAL

## 2018-05-14 ENCOUNTER — APPOINTMENT (OUTPATIENT)
Dept: RADIATION ONCOLOGY | Facility: CLINIC | Age: 68
End: 2018-05-14
Attending: RADIOLOGY
Payer: COMMERCIAL

## 2018-05-14 VITALS
RESPIRATION RATE: 18 BRPM | SYSTOLIC BLOOD PRESSURE: 146 MMHG | BODY MASS INDEX: 18.14 KG/M2 | OXYGEN SATURATION: 98 % | DIASTOLIC BLOOD PRESSURE: 74 MMHG | WEIGHT: 102.4 LBS | TEMPERATURE: 98.2 F | HEART RATE: 87 BPM | HEIGHT: 63 IN

## 2018-05-14 DIAGNOSIS — C21.1 MALIGNANT NEOPLASM OF ANAL CANAL (H): ICD-10-CM

## 2018-05-14 DIAGNOSIS — C21.1 MALIGNANT NEOPLASM OF ANAL CANAL (H): Primary | ICD-10-CM

## 2018-05-14 DIAGNOSIS — Z79.899 ON STATIN THERAPY: ICD-10-CM

## 2018-05-14 LAB
ALBUMIN SERPL-MCNC: 3.4 G/DL (ref 3.4–5)
ALP SERPL-CCNC: 89 U/L (ref 40–150)
ALT SERPL W P-5'-P-CCNC: 17 U/L (ref 0–50)
ANION GAP SERPL CALCULATED.3IONS-SCNC: 8 MMOL/L (ref 3–14)
AST SERPL W P-5'-P-CCNC: 17 U/L (ref 0–45)
BASOPHILS # BLD AUTO: 0 10E9/L (ref 0–0.2)
BASOPHILS NFR BLD AUTO: 0.4 %
BILIRUB SERPL-MCNC: 0.3 MG/DL (ref 0.2–1.3)
BUN SERPL-MCNC: 18 MG/DL (ref 7–30)
CALCIUM SERPL-MCNC: 9.5 MG/DL (ref 8.5–10.1)
CHLORIDE SERPL-SCNC: 105 MMOL/L (ref 94–109)
CHOLEST SERPL-MCNC: 148 MG/DL
CO2 SERPL-SCNC: 23 MMOL/L (ref 20–32)
CREAT SERPL-MCNC: 1.29 MG/DL (ref 0.52–1.04)
DIFFERENTIAL METHOD BLD: ABNORMAL
EOSINOPHIL # BLD AUTO: 0.1 10E9/L (ref 0–0.7)
EOSINOPHIL NFR BLD AUTO: 2.3 %
ERYTHROCYTE [DISTWIDTH] IN BLOOD BY AUTOMATED COUNT: 14.2 % (ref 10–15)
GFR SERPL CREATININE-BSD FRML MDRD: 41 ML/MIN/1.7M2
GLUCOSE SERPL-MCNC: 99 MG/DL (ref 70–99)
HCT VFR BLD AUTO: 38 % (ref 35–47)
HDLC SERPL-MCNC: 60 MG/DL
HGB BLD-MCNC: 12.6 G/DL (ref 11.7–15.7)
IMM GRANULOCYTES # BLD: 0 10E9/L (ref 0–0.4)
IMM GRANULOCYTES NFR BLD: 0.4 %
LDLC SERPL CALC-MCNC: 57 MG/DL
LYMPHOCYTES # BLD AUTO: 0.3 10E9/L (ref 0.8–5.3)
LYMPHOCYTES NFR BLD AUTO: 6.1 %
MCH RBC QN AUTO: 28.8 PG (ref 26.5–33)
MCHC RBC AUTO-ENTMCNC: 33.2 G/DL (ref 31.5–36.5)
MCV RBC AUTO: 87 FL (ref 78–100)
MONOCYTES # BLD AUTO: 0.4 10E9/L (ref 0–1.3)
MONOCYTES NFR BLD AUTO: 7.3 %
NEUTROPHILS # BLD AUTO: 4.4 10E9/L (ref 1.6–8.3)
NEUTROPHILS NFR BLD AUTO: 83.5 %
NONHDLC SERPL-MCNC: 88 MG/DL
NRBC # BLD AUTO: 0 10*3/UL
NRBC BLD AUTO-RTO: 0 /100
PLATELET # BLD AUTO: 214 10E9/L (ref 150–450)
POTASSIUM SERPL-SCNC: 3.6 MMOL/L (ref 3.4–5.3)
PROT SERPL-MCNC: 7.4 G/DL (ref 6.8–8.8)
RBC # BLD AUTO: 4.37 10E12/L (ref 3.8–5.2)
SODIUM SERPL-SCNC: 136 MMOL/L (ref 133–144)
TRIGL SERPL-MCNC: 156 MG/DL
WBC # BLD AUTO: 5.2 10E9/L (ref 4–11)

## 2018-05-14 PROCEDURE — G0463 HOSPITAL OUTPT CLINIC VISIT: HCPCS | Mod: ZF

## 2018-05-14 PROCEDURE — 99214 OFFICE O/P EST MOD 30 MIN: CPT | Mod: ZP | Performed by: INTERNAL MEDICINE

## 2018-05-14 PROCEDURE — 85025 COMPLETE CBC W/AUTO DIFF WBC: CPT | Performed by: INTERNAL MEDICINE

## 2018-05-14 PROCEDURE — 77386 ZZH IMRT TREATMENT DELIVERY, COMPLEX: CPT | Performed by: RADIOLOGY

## 2018-05-14 PROCEDURE — 80061 LIPID PANEL: CPT | Performed by: INTERNAL MEDICINE

## 2018-05-14 PROCEDURE — 36415 COLL VENOUS BLD VENIPUNCTURE: CPT

## 2018-05-14 PROCEDURE — 80053 COMPREHEN METABOLIC PANEL: CPT | Performed by: INTERNAL MEDICINE

## 2018-05-14 ASSESSMENT — PAIN SCALES - GENERAL: PAINLEVEL: NO PAIN (0)

## 2018-05-14 NOTE — NURSING NOTE
Chief Complaint   Patient presents with     Blood Draw     Labs only     Vitals done, labs drawn by venipuncture.  See doc flow sheets for details.  Vianney Stokes CMA

## 2018-05-14 NOTE — NURSING NOTE
"Oncology Rooming Note    May 14, 2018 2:16 PM   Maribel Yang is a 67 year old female who presents for:    Chief Complaint   Patient presents with     Oncology Clinic Visit     Anal Cancer     Initial Vitals: /74 (BP Location: Right arm, Patient Position: Sitting, Cuff Size: Adult Small)  Pulse 87  Temp 98.2  F (36.8  C)  Resp 18  Ht 1.6 m (5' 2.99\")  Wt 46.4 kg (102 lb 6.4 oz)  SpO2 98%  BMI 18.14 kg/m2 Estimated body mass index is 18.14 kg/(m^2) as calculated from the following:    Height as of this encounter: 1.6 m (5' 2.99\").    Weight as of this encounter: 46.4 kg (102 lb 6.4 oz). Body surface area is 1.44 meters squared.  No Pain (0) Comment: Data Unavailable   No LMP recorded. Patient is postmenopausal.  Allergies reviewed: Yes  Medications reviewed: Yes    Medications: Medication refills not needed today.  Pharmacy name entered into [employer]:    [facility]  [facility] - [place], [place] - [address] [place] [address] PHARMACY [place], [place] - [address] [facility] [phone]    Clinical concerns: No New Concerns    5 minutes for nursing intake (face to face time)     LEEANN Diaz      "
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
## 2018-05-15 ENCOUNTER — APPOINTMENT (OUTPATIENT)
Dept: RADIATION ONCOLOGY | Facility: CLINIC | Age: 68
End: 2018-05-15
Attending: RADIOLOGY
Payer: COMMERCIAL

## 2018-05-15 DIAGNOSIS — I73.9 PERIPHERAL ARTERY DISEASE (H): ICD-10-CM

## 2018-05-15 PROCEDURE — 77386 ZZH IMRT TREATMENT DELIVERY, COMPLEX: CPT | Performed by: RADIOLOGY

## 2018-05-15 NOTE — TELEPHONE ENCOUNTER
"Requested Prescriptions   Pending Prescriptions Disp Refills     cilostazol (PLETAL) 100 MG tablet [Pharmacy Med Name: CILOSTAZOL 100MG TABS] 60 tablet 1     Sig: TAKE ONE TABLET BY MOUTH TWICE A DAY. (DUE FOR AN APPOINTMENT)    There is no refill protocol information for this order        clopidogrel (PLAVIX) 75 MG tablet [Pharmacy Med Name: CLOPIDOGREL BISULFATE 75MG TABS] 30 tablet 1     Sig: TAKE ONE TABLET BY MOUTH EVERY DAY. (DUE FOR AN APPOINTMENT)    Plavix Passed    5/15/2018  8:21 AM       Passed - No active PPI on record unless is Protonix       Passed - Normal HGB on file in past 12 months    Recent Labs   Lab Test  05/14/18   1412   HGB  12.6              Passed - Normal Platelets on file in past 12 months    Recent Labs   Lab Test  05/14/18   1412   PLT  214              Passed - Recent (12 mo) or future (30 days) visit within the authorizing provider's specialty    Patient had office visit in the last 12 months or has a visit in the next 30 days with authorizing provider or within the authorizing provider's specialty.  See \"Patient Info\" tab in inbasket, or \"Choose Columns\" in Meds & Orders section of the refill encounter.           Passed - Patient is age 18 or older       Passed - No active pregnancy on record       Passed - No positive pregnancy test in past 12 months        "

## 2018-05-16 ENCOUNTER — APPOINTMENT (OUTPATIENT)
Dept: RADIATION ONCOLOGY | Facility: CLINIC | Age: 68
End: 2018-05-16
Attending: RADIOLOGY
Payer: COMMERCIAL

## 2018-05-16 ENCOUNTER — TELEPHONE (OUTPATIENT)
Dept: ONCOLOGY | Facility: CLINIC | Age: 68
End: 2018-05-16

## 2018-05-16 DIAGNOSIS — C21.1 MALIGNANT NEOPLASM OF ANAL CANAL (H): Primary | ICD-10-CM

## 2018-05-16 DIAGNOSIS — Z79.899 ENCOUNTER FOR LONG-TERM (CURRENT) USE OF MEDICATIONS: ICD-10-CM

## 2018-05-16 PROCEDURE — 77386 ZZH IMRT TREATMENT DELIVERY, COMPLEX: CPT | Performed by: RADIOLOGY

## 2018-05-16 RX ORDER — CILOSTAZOL 100 MG/1
TABLET ORAL
Qty: 60 TABLET | Refills: 1 | Status: SHIPPED | OUTPATIENT
Start: 2018-05-16 | End: 2018-08-07

## 2018-05-16 RX ORDER — CLOPIDOGREL BISULFATE 75 MG/1
TABLET ORAL
Qty: 30 TABLET | Refills: 1 | Status: SHIPPED | OUTPATIENT
Start: 2018-05-16 | End: 2018-08-07

## 2018-05-16 NOTE — TELEPHONE ENCOUNTER
Prescription approved per [facility] Refill Protocol.

## 2018-05-16 NOTE — ORAL ONC MGMT
Oral Chemotherapy Monitoring Program     Placed call to patient in follow up of Xeloda therapy, reviewed lab results. No concerns at this time. Dr. Tucker would like repeat labs once a week. Order entered and plans were made for labs again on 5/21/2018. Maribel expressed understanding and agreement with this plan and she thanked me for the call and care.     Rob ManuelD  [facility]  [phone]  May 16, 2018

## 2018-05-17 ENCOUNTER — OFFICE VISIT (OUTPATIENT)
Dept: RADIATION ONCOLOGY | Facility: CLINIC | Age: 68
End: 2018-05-17
Attending: RADIOLOGY
Payer: COMMERCIAL

## 2018-05-17 VITALS — BODY MASS INDEX: 18.55 KG/M2 | WEIGHT: 104.7 LBS

## 2018-05-17 DIAGNOSIS — C21.1 MALIGNANT NEOPLASM OF ANAL CANAL (H): Primary | ICD-10-CM

## 2018-05-17 NOTE — LETTER
5/17/2018       RE: Maribel Yang  [address]  [place] [address]     Dear Colleague,    Thank you for referring your patient, Maribel Yang, to the RADIATION ONCOLOGY CLINIC. Please see a copy of my visit note below.    WEEKLY MANAGEMENT NOTE  Radiation Oncology          Patient Name: Maribel Yang  MRN: [id number]       Pelvis  2520 cGy / 5040 cGy  17/28         DAILY DOSE:     180  cGy/ day,  5 times/week  Tomotherapy    DISEASE UNDER TREATMENT: Poorly differentiated  squamous cell cancer of anal canal.[id number]. S/p complete excision.     CHEMOTHERAPY: Xeloda    CTC V4.0 Toxicity Criteria  Fatigue: Grade 1: Fatigue relieved by rest  Pain Score:  0/10     :   Urinary Frequency/Urgency: Grade 0: No change from baseline  Urinary Incontinence: Grade 0: No change from baseline  Dysuria:Grade 0: No change from baseline  Comment:    GI:  Diarrhea:Grade 0  No change over baseline  Proctitis: Grade 0 No symptom  Comment: Perineum without skin changes      OBJECTIVE:  Skin reaction grade 1. More skin reaction noted in the inguinal region.    IMPRESSION: The patient is tolerating the treatment.  The patient set up, dose, and MVCT images were reviewed.      PLAN: Continue radiotherapy    PAIN MANAGEMENT PLAN: The patient does not require pain management    HERO Mckeon M.D.  Department of Radiation Oncology  [facility]
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
## 2018-05-17 NOTE — PROGRESS NOTES
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 S/p complete excision.     CHEMOTHERAPY: Xeloda    CTC V4.0 Toxicity Criteria  Fatigue: Grade 1: Fatigue relieved by rest  Pain Score:  0/10     :   Urinary Frequency/Urgency: Grade 0: No change from baseline  Urinary Incontinence: Grade 0: No change from baseline  Dysuria:Grade 0: No change from baseline  Comment:    GI:  Diarrhea:Grade 0  No change over baseline  Proctitis: Grade 0 No symptom  Comment: Perineum without skin changes      OBJECTIVE:  Skin reaction grade 1. More skin reaction noted in the inguinal region.    IMPRESSION: The patient is tolerating the treatment.  The patient set up, dose, and MVCT images were reviewed.      PLAN: Continue radiotherapy    PAIN MANAGEMENT PLAN: The patient does not require pain management    HERO Mckeon M.D.  Department of Radiation Oncology  [facility]

## 2018-05-18 ENCOUNTER — APPOINTMENT (OUTPATIENT)
Dept: RADIATION ONCOLOGY | Facility: CLINIC | Age: 68
End: 2018-05-18
Attending: RADIOLOGY
Payer: COMMERCIAL

## 2018-05-18 DIAGNOSIS — Z94.0 KIDNEY REPLACED BY TRANSPLANT: ICD-10-CM

## 2018-05-18 PROCEDURE — 77336 RADIATION PHYSICS CONSULT: CPT | Performed by: RADIOLOGY

## 2018-05-18 PROCEDURE — 77386 ZZH IMRT TREATMENT DELIVERY, COMPLEX: CPT | Performed by: RADIOLOGY

## 2018-05-18 NOTE — TELEPHONE ENCOUNTER
Requested Prescriptions   Pending Prescriptions Disp Refills     amoxicillin (AMOXIL) 500 MG capsule  Last Written Prescription Date:  06/01/2016  Last Fill Quantity: 16 capsule,  # refills: 3   Last Office Visit: 9/6/2017   Future Office Visit:    16 capsule 3     Sig: Take 4 capsules (2,000 mg) by mouth once as needed Prior to dental procedures    There is no refill protocol information for this order

## 2018-05-18 NOTE — TELEPHONE ENCOUNTER
PN,  Routing refill request to provider for review/approval because:  Drug not on the [facility] refill protocol   Thanks, Monse Hopson RN

## 2018-05-20 ENCOUNTER — NURSE TRIAGE (OUTPATIENT)
Dept: NURSING | Facility: CLINIC | Age: 68
End: 2018-05-20

## 2018-05-20 NOTE — TELEPHONE ENCOUNTER
Maribel has a abscess draining yellow drainage  that is on buttocks and is in pain.  Denies fever.  Denies red streak.

## 2018-05-20 NOTE — TELEPHONE ENCOUNTER
Reason for Disposition    [1] Boil > 1/2 inch across (> 12 mm; larger than a marble) AND [2] center is soft or pus colored    Additional Information    Negative: [1] Widespread rash AND [2] bright red, sunburn-like AND [3] too weak to stand    Negative: Sounds like a life-threatening emergency to the triager    Negative: Widespread red rash    Negative: Black (necrotic) color or blisters develop in wound    Negative: Patient sounds very sick or weak to the triager    Negative: SEVERE pain (e.g., excruciating)    Negative: Red streak from area of infection    Negative: Fever > 100.5 F (38.1 C)    Negative: Boil > 2 inches across (> 5 cm; larger than a golf ball or ping pong ball)    Protocols used: BOIL (SKIN ABSCESS)-ADULT-

## 2018-05-21 ENCOUNTER — TELEPHONE (OUTPATIENT)
Dept: ONCOLOGY | Facility: CLINIC | Age: 68
End: 2018-05-21

## 2018-05-21 ENCOUNTER — APPOINTMENT (OUTPATIENT)
Dept: RADIATION ONCOLOGY | Facility: CLINIC | Age: 68
End: 2018-05-21
Attending: RADIOLOGY
Payer: COMMERCIAL

## 2018-05-21 DIAGNOSIS — C21.1 MALIGNANT NEOPLASM OF ANAL CANAL (H): ICD-10-CM

## 2018-05-21 DIAGNOSIS — Z79.899 ENCOUNTER FOR LONG-TERM (CURRENT) USE OF MEDICATIONS: ICD-10-CM

## 2018-05-21 LAB
ALBUMIN SERPL-MCNC: 3.5 G/DL (ref 3.4–5)
ALP SERPL-CCNC: 87 U/L (ref 40–150)
ALT SERPL W P-5'-P-CCNC: 17 U/L (ref 0–50)
ANION GAP SERPL CALCULATED.3IONS-SCNC: 8 MMOL/L (ref 3–14)
AST SERPL W P-5'-P-CCNC: 18 U/L (ref 0–45)
BASOPHILS # BLD AUTO: 0 10E9/L (ref 0–0.2)
BASOPHILS NFR BLD AUTO: 0.2 %
BILIRUB SERPL-MCNC: 0.3 MG/DL (ref 0.2–1.3)
BUN SERPL-MCNC: 19 MG/DL (ref 7–30)
CALCIUM SERPL-MCNC: 9.5 MG/DL (ref 8.5–10.1)
CHLORIDE SERPL-SCNC: 102 MMOL/L (ref 94–109)
CO2 SERPL-SCNC: 25 MMOL/L (ref 20–32)
CREAT SERPL-MCNC: 1.29 MG/DL (ref 0.52–1.04)
DIFFERENTIAL METHOD BLD: ABNORMAL
EOSINOPHIL # BLD AUTO: 0.1 10E9/L (ref 0–0.7)
EOSINOPHIL NFR BLD AUTO: 2.2 %
ERYTHROCYTE [DISTWIDTH] IN BLOOD BY AUTOMATED COUNT: 15.1 % (ref 10–15)
GFR SERPL CREATININE-BSD FRML MDRD: 41 ML/MIN/1.7M2
GLUCOSE SERPL-MCNC: 98 MG/DL (ref 70–99)
HCT VFR BLD AUTO: 39.9 % (ref 35–47)
HGB BLD-MCNC: 13.2 G/DL (ref 11.7–15.7)
IMM GRANULOCYTES # BLD: 0 10E9/L (ref 0–0.4)
IMM GRANULOCYTES NFR BLD: 0.7 %
LYMPHOCYTES # BLD AUTO: 0.3 10E9/L (ref 0.8–5.3)
LYMPHOCYTES NFR BLD AUTO: 4.6 %
MCH RBC QN AUTO: 28.9 PG (ref 26.5–33)
MCHC RBC AUTO-ENTMCNC: 33.1 G/DL (ref 31.5–36.5)
MCV RBC AUTO: 87 FL (ref 78–100)
MONOCYTES # BLD AUTO: 0.5 10E9/L (ref 0–1.3)
MONOCYTES NFR BLD AUTO: 8.9 %
NEUTROPHILS # BLD AUTO: 4.5 10E9/L (ref 1.6–8.3)
NEUTROPHILS NFR BLD AUTO: 83.4 %
NRBC # BLD AUTO: 0 10*3/UL
NRBC BLD AUTO-RTO: 0 /100
PLATELET # BLD AUTO: 205 10E9/L (ref 150–450)
POTASSIUM SERPL-SCNC: 4 MMOL/L (ref 3.4–5.3)
PROT SERPL-MCNC: 7.7 G/DL (ref 6.8–8.8)
RBC # BLD AUTO: 4.57 10E12/L (ref 3.8–5.2)
SODIUM SERPL-SCNC: 135 MMOL/L (ref 133–144)
WBC # BLD AUTO: 5.4 10E9/L (ref 4–11)

## 2018-05-21 PROCEDURE — 77386 ZZH IMRT TREATMENT DELIVERY, COMPLEX: CPT | Performed by: RADIOLOGY

## 2018-05-21 PROCEDURE — 85025 COMPLETE CBC W/AUTO DIFF WBC: CPT | Performed by: INTERNAL MEDICINE

## 2018-05-21 PROCEDURE — 36415 COLL VENOUS BLD VENIPUNCTURE: CPT

## 2018-05-21 PROCEDURE — 80053 COMPREHEN METABOLIC PANEL: CPT | Performed by: INTERNAL MEDICINE

## 2018-05-21 NOTE — TELEPHONE ENCOUNTER
Oral Chemotherapy Monitoring Program    Placed call to patient in follow up of Xeloda therapy.    Left message to please call back in follow up of therapy. No patient or drug names were mentioned.    Luis Marshall, PharmD  Oral Chemotherapy Monitoring Program  [facility]  [phone]

## 2018-05-21 NOTE — NURSING NOTE
Chief Complaint   Patient presents with     Blood Draw     Lab draw. pt is here only for lab draw   FELIX Peres

## 2018-05-22 ENCOUNTER — APPOINTMENT (OUTPATIENT)
Dept: RADIATION ONCOLOGY | Facility: CLINIC | Age: 68
End: 2018-05-22
Attending: RADIOLOGY
Payer: COMMERCIAL

## 2018-05-22 PROCEDURE — 77386 ZZH IMRT TREATMENT DELIVERY, COMPLEX: CPT | Performed by: RADIOLOGY

## 2018-05-22 RX ORDER — AMOXICILLIN 500 MG/1
2000 CAPSULE ORAL
Qty: 16 CAPSULE | Refills: 3 | Status: ON HOLD | OUTPATIENT
Start: 2018-05-22 | End: 2021-04-30

## 2018-05-23 ENCOUNTER — APPOINTMENT (OUTPATIENT)
Dept: RADIATION ONCOLOGY | Facility: CLINIC | Age: 68
End: 2018-05-23
Attending: RADIOLOGY
Payer: COMMERCIAL

## 2018-05-23 PROCEDURE — 77386 ZZH IMRT TREATMENT DELIVERY, COMPLEX: CPT | Performed by: RADIOLOGY

## 2018-05-24 ENCOUNTER — OFFICE VISIT (OUTPATIENT)
Dept: RADIATION ONCOLOGY | Facility: CLINIC | Age: 68
End: 2018-05-24
Attending: RADIOLOGY
Payer: COMMERCIAL

## 2018-05-24 VITALS — BODY MASS INDEX: 18.76 KG/M2 | WEIGHT: 105.9 LBS

## 2018-05-24 DIAGNOSIS — C21.1 MALIGNANT NEOPLASM OF ANAL CANAL (H): Primary | ICD-10-CM

## 2018-05-24 PROCEDURE — 77386 ZZH IMRT TREATMENT DELIVERY, COMPLEX: CPT | Performed by: RADIOLOGY

## 2018-05-24 RX ORDER — ACETAMINOPHEN AND CODEINE PHOSPHATE 300; 30 MG/1; MG/1
1-2 TABLET ORAL EVERY 6 HOURS PRN
Qty: 50 TABLET | Refills: 0 | Status: SHIPPED | OUTPATIENT
Start: 2018-05-24 | End: 2020-05-29
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
## 2018-05-24 NOTE — PROGRESS NOTES
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 S/p complete excision.     CHEMOTHERAPY: Xeloda    CTC V4.0 Toxicity Criteria  Fatigue: Grade 1: Fatigue relieved by rest  Pain Score:  7-8/10     :   Urinary Frequency/Urgency: Grade 0: No change from baseline  Urinary Incontinence: Grade 0: No change from baseline  Dysuria:Grade 0: No change from baseline  Comment:    GI:  Diarrhea:Grade 0  No change over baseline  Proctitis: Grade 0 No symptom  Comment: Perineum with grade 2 skin changes. Also has significant rectal pain.      OBJECTIVE:  Skin reaction grade 1. More skin reaction noted in the inguinal region.    IMPRESSION: The patient is tolerating the treatment.  The patient set up, dose, and MVCT images were reviewed.      PLAN: Continue radiotherapy    PAIN MANAGEMENT PLAN: The patient will continue current pain medication. Will start Tylenol#3, She had this without problems in the past.Ishe is allergic to Fentanyl)    HERO Mckeon M.D.  Department of Radiation Oncology  [facility]

## 2018-05-24 NOTE — LETTER
5/24/2018       RE: Maribel Yang  [address]  [place] [address]     Dear Colleague,    Thank you for referring your patient, Maribel Yang, to the [facility] ONCOL[facility]Y CL[facility]IC. Please see a copy of my visit note below.    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 S/p complete excision.     CHEMOTHERAPY: Xeloda    CTC V4.0 Toxicity Criteria  Fatigue: Grade 1: Fatigue relieved by rest  Pain Score:  7-8/10     :   Urinary Frequency/Urgency: Grade 0: No change from baseline  Urinary Incontinence: Grade 0: No change from baseline  Dysuria:Grade 0: No change from baseline  Comment:    GI:  Diarrhea:Grade 0  No change over baseline  Proctitis: Grade 0 No symptom  Comment: Perineum with grade 2 skin changes. Also has significant rectal pain.      OBJECTIVE:  Skin reaction grade 1. More skin reaction noted in the inguinal region.    IMPRESSION: The patient is tolerating the treatment.  The patient set up, dose, and MVCT images were reviewed.      PLAN: Continue radiotherapy    PAIN MANAGEMENT PLAN: The patient will continue current pain medication. Will start Tylenol#3, She had this without problems in the past.Jhonatan is allergic to Fentanyl)    HERO Mckeon M.D.  Department of Radiation Oncology  [facility]    Again, thank you for allowing me to participate in the care of your patient.      Sincerely,    Nasim Mckeon MD

## 2018-05-25 ENCOUNTER — APPOINTMENT (OUTPATIENT)
Dept: RADIATION ONCOLOGY | Facility: CLINIC | Age: 68
End: 2018-05-25
Attending: RADIOLOGY
Payer: COMMERCIAL

## 2018-05-25 PROCEDURE — 77386 ZZH IMRT TREATMENT DELIVERY, COMPLEX: CPT | Performed by: RADIOLOGY

## 2018-05-25 PROCEDURE — 77336 RADIATION PHYSICS CONSULT: CPT | Performed by: RADIOLOGY

## 2018-05-29 ENCOUNTER — APPOINTMENT (OUTPATIENT)
Dept: RADIATION ONCOLOGY | Facility: CLINIC | Age: 68
End: 2018-05-29
Attending: RADIOLOGY
Payer: COMMERCIAL

## 2018-05-29 PROCEDURE — 77386 ZZH IMRT TREATMENT DELIVERY, COMPLEX: CPT | Performed by: RADIOLOGY

## 2018-05-30 ENCOUNTER — APPOINTMENT (OUTPATIENT)
Dept: RADIATION ONCOLOGY | Facility: CLINIC | Age: 68
End: 2018-05-30
Attending: RADIOLOGY
Payer: COMMERCIAL

## 2018-05-30 PROCEDURE — 77386 ZZH IMRT TREATMENT DELIVERY, COMPLEX: CPT | Performed by: RADIOLOGY

## 2018-05-31 ENCOUNTER — APPOINTMENT (OUTPATIENT)
Dept: RADIATION ONCOLOGY | Facility: CLINIC | Age: 68
End: 2018-05-31
Attending: RADIOLOGY
Payer: COMMERCIAL

## 2018-05-31 ENCOUNTER — TELEPHONE (OUTPATIENT)
Dept: TRANSPLANT | Facility: CLINIC | Age: 68
End: 2018-05-31

## 2018-05-31 VITALS — WEIGHT: 104.59 LBS | BODY MASS INDEX: 18.53 KG/M2

## 2018-05-31 DIAGNOSIS — Z94.0 KIDNEY REPLACED BY TRANSPLANT: Primary | ICD-10-CM

## 2018-05-31 DIAGNOSIS — C21.1 MALIGNANT NEOPLASM OF ANAL CANAL (H): Primary | ICD-10-CM

## 2018-05-31 PROCEDURE — 77386 ZZH IMRT TREATMENT DELIVERY, COMPLEX: CPT | Performed by: RADIOLOGY

## 2018-05-31 NOTE — PROGRESS NOTES
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 S/p complete excision.     CHEMOTHERAPY: Xeloda    CTC V4.0 Toxicity Criteria  Fatigue: Grade 1: Fatigue relieved by rest  Pain Score:  7-8/10     :   Urinary Frequency/Urgency: Grade 0: No change from baseline  Urinary Incontinence: Grade 0: No change from baseline  Dysuria:Grade 0: No change from baseline  Comment:    GI:  Diarrhea:Grade 0  No change over baseline  Proctitis: Grade 0 No symptom  Comment: Perineum with grade 2 skin changes. Also has significant perineal discomfort.      OBJECTIVE:  Skin reaction grade 1-beginning 2. More skin reaction noted perineum.    IMPRESSION: The patient is tolerating the treatment.  The patient set up, dose, and MVCT images were reviewed.      PLAN: Continue radiotherapy. Break after 6/1/2018. I would prefer the patient to return on 6/6/2018.Continue Sitz bath and Proshield.    PAIN MANAGEMENT PLAN: The patient will continue current pain medication. Currently on Tylenol#3,  L. Nasim Mckeon M.D.  Department of Radiation Oncology  [facility]
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
## 2018-05-31 NOTE — TELEPHONE ENCOUNTER
Post Kidney and Pancreas Transplant Team Conference  Date: 5/31/2018  Transplant Coordinator: Tran Cummings, Radha Cooper     Attendees:    [x]  Dr. See  [x] Zoya Damian, RN  [x] Marguerite Huber LPN    [x]  Dr. Fuller  [] Chandni Fulton RN  [] Monika Langston LPN  []  Dr. Sanders   [] Dinorah Scott RN  []  Dr. Chacko   [] Adelaide Kay RN  [] Dr. Eckert  [] Sayda Young RN  [] Dr. Long   [x] Torey Heath RN  [] Dr. Cuevas [x] Radha Cooper, AVERY  [] Surgery Fellow [] Citlali Bansal RN  [] Dana Obrien NP    Verbal Plan Read Back:   Monthly EBV PCR QT needed.    Routed to RN Coordinator   Marguerite Huber

## 2018-05-31 NOTE — TELEPHONE ENCOUNTER
ISSUE:  Needs monthly EBV levels per team    PLAN:  Call placed to pt. No answer.   Left vm asking that she call back.  Orders placed for monthly transplant labs and EBV checks.

## 2018-05-31 NOTE — LETTER
5/31/2018       RE: Maribel Yang  [address]  [facility] [address]     Dear Colleague,    Thank you for referring your patient, Maribel Yang, to the RADIATION ONCOLOGY CLINIC. Please see a copy of my visit note below.    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 S/p complete excision.     CHEMOTHERAPY: Xeloda    CTC V4.0 Toxicity Criteria  Fatigue: Grade 1: Fatigue relieved by rest  Pain Score:  7-8/10     :   Urinary Frequency/Urgency: Grade 0: No change from baseline  Urinary Incontinence: Grade 0: No change from baseline  Dysuria:Grade 0: No change from baseline  Comment:    GI:  Diarrhea:Grade 0  No change over baseline  Proctitis: Grade 0 No symptom  Comment: Perineum with grade 2 skin changes. Also has significant perineal discomfort.      OBJECTIVE:  Skin reaction grade 1-beginning 2. More skin reaction noted perineum.    IMPRESSION: The patient is tolerating the treatment.  The patient set up, dose, and MVCT images were reviewed.      PLAN: Continue radiotherapy. Break after 6/1/2018. I would prefer the patient to return on 6/6/2018.Continue Sitz bath and Proshield.    PAIN MANAGEMENT PLAN: The patient will continue current pain medication. Currently on Tylenol#3,  L. Nasim Mckeon M.D.  Department of Radiation Oncology  [facility]

## 2018-06-01 ENCOUNTER — APPOINTMENT (OUTPATIENT)
Dept: RADIATION ONCOLOGY | Facility: CLINIC | Age: 68
End: 2018-06-01
Attending: RADIOLOGY
Payer: COMMERCIAL

## 2018-06-01 PROCEDURE — 77386 ZZH IMRT TREATMENT DELIVERY, COMPLEX: CPT | Performed by: RADIOLOGY

## 2018-06-06 ENCOUNTER — APPOINTMENT (OUTPATIENT)
Dept: RADIATION ONCOLOGY | Facility: CLINIC | Age: 68
End: 2018-06-06
Attending: RADIOLOGY
Payer: COMMERCIAL

## 2018-06-06 PROCEDURE — 77336 RADIATION PHYSICS CONSULT: CPT | Performed by: RADIOLOGY

## 2018-06-06 PROCEDURE — 77386 ZZH IMRT TREATMENT DELIVERY, COMPLEX: CPT | Performed by: RADIOLOGY

## 2018-06-07 ENCOUNTER — APPOINTMENT (OUTPATIENT)
Dept: RADIATION ONCOLOGY | Facility: CLINIC | Age: 68
End: 2018-06-07
Attending: RADIOLOGY
Payer: COMMERCIAL

## 2018-06-07 VITALS
BODY MASS INDEX: 17.52 KG/M2 | DIASTOLIC BLOOD PRESSURE: 73 MMHG | HEART RATE: 65 BPM | WEIGHT: 98.9 LBS | SYSTOLIC BLOOD PRESSURE: 131 MMHG

## 2018-06-07 DIAGNOSIS — C21.1 MALIGNANT NEOPLASM OF ANAL CANAL (H): Primary | ICD-10-CM

## 2018-06-07 PROCEDURE — 77386 ZZH IMRT TREATMENT DELIVERY, COMPLEX: CPT | Performed by: RADIOLOGY

## 2018-06-07 PROCEDURE — 96360 HYDRATION IV INFUSION INIT: CPT | Mod: ZF

## 2018-06-07 PROCEDURE — 25000128 H RX IP 250 OP 636: Mod: ZF | Performed by: RADIOLOGY

## 2018-06-07 RX ADMIN — SODIUM CHLORIDE 1000 ML: 9 INJECTION, SOLUTION INTRAVENOUS at 13:45
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
## 2018-06-07 NOTE — PROGRESS NOTES
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 S/p complete excision.     CHEMOTHERAPY: Xeloda    CTC V4.0 Toxicity Criteria  Fatigue: Grade 1: Fatigue relieved by rest  Pain Score:  7-8/10     :   Urinary Frequency/Urgency: Grade 0: No change from baseline  Urinary Incontinence: Grade 0: No change from baseline  Dysuria:Grade 0: No change from baseline  Comment:    GI:  Diarrhea:Grade 2  Increase of 4-6 stools/day over baseline; IVF may be needed <24 hrs, not interfering with ADL  Proctitis: Grade 0 No symptom  Comment: Perineum with grade 2 skin changes. Inguinal fold dermatitis is getting better. The patient also had nausea and vomiting today.      OBJECTIVE:  Skin reaction grade 1-beginning 2. More skin reaction noted perineum.  Wt Readings from Last 2 Encounters:   06/07/18 44.9 kg (98 lb 14.4 oz)   05/31/18 47.4 kg (104 lb 9.4 oz)   MM dry, Orthostatic BP            IMPRESSION: The patient has significant toxicity.  The patient set up, dose, and MVCT images were reviewed.      PLAN: Continue radiotherapy. The patient will complete in 2 sessions. We discussed possible break but decided to complete in 2 days rather than lengthening the treatment. IVF is planned today.    PAIN MANAGEMENT PLAN: The patient will continue current pain medication.   HERO Mckeon M.D.  Department of Radiation Oncology  [facility]

## 2018-06-07 NOTE — LETTER
6/7/2018       RE: Maribel Yang  [address]  [facility] [address]     Dear Colleague,    Thank you for referring your patient, Maribel Yang, to the RADIATION ONCOLOGY CLINIC. Please see a copy of my visit note below.    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 S/p complete excision.     CHEMOTHERAPY: Xeloda    CTC V4.0 Toxicity Criteria  Fatigue: Grade 1: Fatigue relieved by rest  Pain Score:  7-8/10     :   Urinary Frequency/Urgency: Grade 0: No change from baseline  Urinary Incontinence: Grade 0: No change from baseline  Dysuria:Grade 0: No change from baseline  Comment:    GI:  Diarrhea:Grade 2  Increase of 4-6 stools/day over baseline; IVF may be needed <24 hrs, not interfering with ADL  Proctitis: Grade 0 No symptom  Comment: Perineum with grade 2 skin changes. Inguinal fold dermatitis is getting better. The patient also had nausea and vomiting today.      OBJECTIVE:  Skin reaction grade 1-beginning 2. More skin reaction noted perineum.  Wt Readings from Last 2 Encounters:   06/07/18 44.9 kg (98 lb 14.4 oz)   05/31/18 47.4 kg (104 lb 9.4 oz)   MM dry, Orthostatic BP            IMPRESSION: The patient has significant toxicity.  The patient set up, dose, and MVCT images were reviewed.      PLAN: Continue radiotherapy. The patient will complete in 2 sessions. We discussed possible break but decided to complete in 2 days rather than lengthening the treatment. IVF is planned today.    PAIN MANAGEMENT PLAN: The patient will continue current pain medication.   HERO Mckeon M.D.  Department of Radiation Oncology  [facility]    Again, thank you for allowing me to participate in the care of your  patient.      Sincerely,    Nasim Mckeon MD

## 2018-06-07 NOTE — LETTER
6/7/2018       RE: Maribel Yang  [address]  [facility] [address]     Dear Colleague,    Thank you for referring your patient, Maribel Yang, to the RADIATION ONCOLOGY CLINIC. Please see a copy of my visit note below.    Infusion Nursing Note:  Maribel Yang presents today for IVF's.    Patient seen by provider today: No   present during visit today: Not Applicable.    Note: N/A.    Intravenous Access:  Peripheral IV placed.    Treatment Conditions:  Not Applicable.      Post Infusion Assessment:  Access discontinued per protocol.    Discharge Plan:   Patient discharged in stable condition accompanied by: daughter.    Noelle Landis RN                          Again, thank you for allowing me to participate in the care of your patient.      Sincerely,    Nasim Mckeon MD

## 2018-06-07 NOTE — PROGRESS NOTES
Infusion Nursing Note:  Maribellc Yang presents today for IVF's.    Patient seen by provider today: No   present during visit today: Not Applicable.    Note: N/A.    Intravenous Access:  Peripheral IV placed.    Treatment Conditions:  Not Applicable.      Post Infusion Assessment:  Access discontinued per protocol.    Discharge Plan:   Patient discharged in stable condition accompanied by: daughter.    Noelle Landis RN

## 2018-06-07 NOTE — PATIENT INSTRUCTIONS
Continuing Management of the Effects of Radiation Treatment    The side effects of radiation therapy should gradually decrease in 2 to 3 weeks after you have finished radiation.  Some effects take longer to resolve.    Skin reactions:  Skin changes (such as redness or irritation) should begin to get better gradually.  Some people will have a permanent change in skin color.  Their skin may be more pink or  tan  than the untreated skin.  The skin may be thinner or more fragile than before treatment.  Continue to use a gentle moisturizing lotion for several months.  You should always protect the skin in the area that was treated by using sunscreen of spf 30 or higher.      Other skin care instructions:    Fatigue caused by radiation therapy will decrease and your energy will improve.    For concerns or questions call Department of Therapeutic Radiology [phone]

## 2018-06-08 ENCOUNTER — APPOINTMENT (OUTPATIENT)
Dept: RADIATION ONCOLOGY | Facility: CLINIC | Age: 68
End: 2018-06-08
Attending: RADIOLOGY
Payer: COMMERCIAL

## 2018-06-08 PROCEDURE — 77386 ZZH IMRT TREATMENT DELIVERY, COMPLEX: CPT | Performed by: RADIOLOGY

## 2018-06-11 ENCOUNTER — APPOINTMENT (OUTPATIENT)
Dept: LAB | Facility: CLINIC | Age: 68
End: 2018-06-11
Attending: INTERNAL MEDICINE
Payer: COMMERCIAL

## 2018-06-11 ENCOUNTER — APPOINTMENT (OUTPATIENT)
Dept: RADIATION ONCOLOGY | Facility: CLINIC | Age: 68
End: 2018-06-11
Attending: RADIOLOGY
Payer: COMMERCIAL

## 2018-06-11 ENCOUNTER — ONCOLOGY VISIT (OUTPATIENT)
Dept: ONCOLOGY | Facility: CLINIC | Age: 68
End: 2018-06-11
Attending: INTERNAL MEDICINE
Payer: COMMERCIAL

## 2018-06-11 VITALS
WEIGHT: 100.6 LBS | HEIGHT: 62 IN | BODY MASS INDEX: 18.51 KG/M2 | RESPIRATION RATE: 16 BRPM | SYSTOLIC BLOOD PRESSURE: 137 MMHG | OXYGEN SATURATION: 95 % | DIASTOLIC BLOOD PRESSURE: 71 MMHG | TEMPERATURE: 98.2 F | HEART RATE: 63 BPM

## 2018-06-11 DIAGNOSIS — C21.1 MALIGNANT NEOPLASM OF ANAL CANAL (H): ICD-10-CM

## 2018-06-11 LAB
ALBUMIN SERPL-MCNC: 3.4 G/DL (ref 3.4–5)
ALP SERPL-CCNC: 88 U/L (ref 40–150)
ALT SERPL W P-5'-P-CCNC: 16 U/L (ref 0–50)
ANION GAP SERPL CALCULATED.3IONS-SCNC: 9 MMOL/L (ref 3–14)
AST SERPL W P-5'-P-CCNC: 18 U/L (ref 0–45)
BASOPHILS # BLD AUTO: 0 10E9/L (ref 0–0.2)
BASOPHILS NFR BLD AUTO: 0.3 %
BILIRUB SERPL-MCNC: 0.3 MG/DL (ref 0.2–1.3)
BUN SERPL-MCNC: 19 MG/DL (ref 7–30)
CALCIUM SERPL-MCNC: 9.1 MG/DL (ref 8.5–10.1)
CHLORIDE SERPL-SCNC: 106 MMOL/L (ref 94–109)
CO2 SERPL-SCNC: 23 MMOL/L (ref 20–32)
CREAT SERPL-MCNC: 1.39 MG/DL (ref 0.52–1.04)
DIFFERENTIAL METHOD BLD: ABNORMAL
EOSINOPHIL # BLD AUTO: 0.1 10E9/L (ref 0–0.7)
EOSINOPHIL NFR BLD AUTO: 1.6 %
ERYTHROCYTE [DISTWIDTH] IN BLOOD BY AUTOMATED COUNT: 18.6 % (ref 10–15)
GFR SERPL CREATININE-BSD FRML MDRD: 38 ML/MIN/1.7M2
GLUCOSE SERPL-MCNC: 118 MG/DL (ref 70–99)
HCT VFR BLD AUTO: 38.4 % (ref 35–47)
HGB BLD-MCNC: 12.6 G/DL (ref 11.7–15.7)
IMM GRANULOCYTES # BLD: 0 10E9/L (ref 0–0.4)
IMM GRANULOCYTES NFR BLD: 0.3 %
LYMPHOCYTES # BLD AUTO: 0.2 10E9/L (ref 0.8–5.3)
LYMPHOCYTES NFR BLD AUTO: 2.7 %
MCH RBC QN AUTO: 29.9 PG (ref 26.5–33)
MCHC RBC AUTO-ENTMCNC: 32.8 G/DL (ref 31.5–36.5)
MCV RBC AUTO: 91 FL (ref 78–100)
MONOCYTES # BLD AUTO: 0.4 10E9/L (ref 0–1.3)
MONOCYTES NFR BLD AUTO: 6.5 %
NEUTROPHILS # BLD AUTO: 5.6 10E9/L (ref 1.6–8.3)
NEUTROPHILS NFR BLD AUTO: 88.6 %
NRBC # BLD AUTO: 0 10*3/UL
NRBC BLD AUTO-RTO: 0 /100
PLATELET # BLD AUTO: 230 10E9/L (ref 150–450)
POTASSIUM SERPL-SCNC: 3.9 MMOL/L (ref 3.4–5.3)
PROT SERPL-MCNC: 7.6 G/DL (ref 6.8–8.8)
RBC # BLD AUTO: 4.21 10E12/L (ref 3.8–5.2)
SODIUM SERPL-SCNC: 137 MMOL/L (ref 133–144)
WBC # BLD AUTO: 6.3 10E9/L (ref 4–11)

## 2018-06-11 PROCEDURE — 36415 COLL VENOUS BLD VENIPUNCTURE: CPT

## 2018-06-11 PROCEDURE — 77336 RADIATION PHYSICS CONSULT: CPT | Performed by: RADIOLOGY

## 2018-06-11 PROCEDURE — 77386 ZZH IMRT TREATMENT DELIVERY, COMPLEX: CPT | Performed by: RADIOLOGY

## 2018-06-11 PROCEDURE — 80053 COMPREHEN METABOLIC PANEL: CPT | Performed by: INTERNAL MEDICINE

## 2018-06-11 PROCEDURE — G0463 HOSPITAL OUTPT CLINIC VISIT: HCPCS | Mod: ZF

## 2018-06-11 PROCEDURE — 87799 DETECT AGENT NOS DNA QUANT: CPT | Performed by: INTERNAL MEDICINE

## 2018-06-11 PROCEDURE — 99214 OFFICE O/P EST MOD 30 MIN: CPT | Mod: ZP | Performed by: INTERNAL MEDICINE

## 2018-06-11 PROCEDURE — 85025 COMPLETE CBC W/AUTO DIFF WBC: CPT | Performed by: INTERNAL MEDICINE

## 2018-06-11 RX ORDER — MYCOPHENOLIC ACID 180 MG/1
TABLET, DELAYED RELEASE ORAL
COMMUNITY
Start: 2018-04-17 | End: 2018-07-06

## 2018-06-11 ASSESSMENT — PAIN SCALES - GENERAL: PAINLEVEL: NO PAIN (0)
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
## 2018-06-11 NOTE — TELEPHONE ENCOUNTER
Call placed to pt for overall update on status - to review this Thursday  No answer, left vm asking that she call back.
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
## 2018-06-11 NOTE — NURSING NOTE
"Oncology Rooming Note    June 11, 2018 2:25 PM   Maribel Yang is a 67 year old female who presents for:    Chief Complaint   Patient presents with     Blood Draw     labs drawn with vpt by rn.  vs taken     Oncology Clinic Visit     Anal Cancer     Initial Vitals: /71 (BP Location: Right arm, Patient Position: Sitting, Cuff Size: Adult Small)  Pulse 63  Temp 98.2  F (36.8  C) (Oral)  Resp 16  Ht 1.575 m (5' 2\")  Wt 45.6 kg (100 lb 9.6 oz)  SpO2 95%  BMI 18.4 kg/m2 Estimated body mass index is 18.4 kg/(m^2) as calculated from the following:    Height as of this encounter: 1.575 m (5' 2\").    Weight as of this encounter: 45.6 kg (100 lb 9.6 oz). Body surface area is 1.41 meters squared.  No Pain (0) Comment: Data Unavailable   No LMP recorded. Patient is postmenopausal.  Allergies reviewed: Yes  Medications reviewed: Yes    Medications: Medication refills not needed today.  Pharmacy name entered into [employer]:    [facility]  [facility] - [place], [place] - [address] [place] [address] [facility] [place] [place] [facility] [phone]    Clinical concerns: No New Concerns    5 minutes for nursing intake (face to face time)     LEEANN Diaz      "

## 2018-06-11 NOTE — NURSING NOTE
Chief Complaint   Patient presents with     Blood Draw     labs drawn with vpt by rn.  vs taken     Labs drawn with vpt by rn.  Pt tolerated well.  VS taken.  Pt checked in for next appt.    Tresa Penn RN

## 2018-06-12 LAB
EBV DNA # SPEC NAA+PROBE: 8955 {COPIES}/ML
EBV DNA SPEC NAA+PROBE-LOG#: 4 {LOG_COPIES}/ML

## 2018-06-14 ENCOUNTER — TELEPHONE (OUTPATIENT)
Dept: TRANSPLANT | Facility: CLINIC | Age: 68
End: 2018-06-14

## 2018-06-14 NOTE — TELEPHONE ENCOUNTER
ISSUE:  To f/u on pt's overall status re committee review meeting    PLAN:  Call placed to pt. No answer, left vm asking she call back to discuss status and chemo course.

## 2018-06-15 NOTE — TELEPHONE ENCOUNTER
Call placed back from pt.  States that she is feeling well overall. She has been off Xeloda for about 3 days - to f/u in meeting if Dr. See would like to change IS  Monthly labs ordered.
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
## 2018-06-21 ENCOUNTER — TELEPHONE (OUTPATIENT)
Dept: TRANSPLANT | Facility: CLINIC | Age: 68
End: 2018-06-21

## 2018-06-27 ENCOUNTER — ONCOLOGY VISIT (OUTPATIENT)
Dept: RADIATION ONCOLOGY | Facility: CLINIC | Age: 68
End: 2018-06-27
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
## 2018-06-29 NOTE — PROCEDURES
Radiotherapy Treatment Summary          Date of Report: 2018     PATIENT: ERASMO MERINO  MEDICAL RECORD NO: [id number]  : 1950     DIAGNOSIS: C21.1 Malignant neoplasm of anal canal  INTENT OF RADIOTHERAPY: Cure  PATHOLOGY: Squamous cell carcinoma                                  STAGE: [id number]  CONCURRENT SYSTEMIC THERAPY:  Xeloda                  Details of the treatments summarized below are found in records kept in the Department of Radiation Oncology at [facility].     Treatment Summary:  Radiation Oncology - Course: 2 Protocol:   Treatment Site Current Dose Modality From To Elapsed Days Fx.  2 Pelvis/Groin   3,600 cGy 06 X  4/30/2018  2018  25 20  2 Anal boost   1,440 cGy 06 X  5/29/2018  2018  13  8          Dose per Fraction: 180 cGy       Total Dose: 5,040 cGy             COMMENTS:                      (Acute Toxicity Profile by CTC v4.0)  Ms. 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 She underwent excisional biopsy of the lesion with   negative, but close margins. The final stage was [id number]. She went on to complete treatment with   chemoradiation, as described above. She required a 2 day treatment break due to grade 2 skin reaction in the   perineum and significant perineal discomfort. At the end of treatment she was also experiencing grade 2 skin   reaction in the bilateral inguinal folds and grade 2 diarrhea.      PAIN MANAGEMENT:  Pain adequately controlled with Tylenol #3.                           FOLLOW UP PLAN:  Return to clinic in 2 months. We will coordinate this with medical oncology                             Resident Physician:   Moise Morris M.D.   Staff Physician: HERO Mckeon M.D.  Physicist: Emerson Spears, PhD     CC:   MD Meggan Mello MD Jessica Garris, RN                                    Radiation Oncology:  [facility]  [address] [place] [address]

## 2018-07-13 DIAGNOSIS — Z94.0 KIDNEY REPLACED BY TRANSPLANT: ICD-10-CM

## 2018-07-13 DIAGNOSIS — Z79.899 ENCOUNTER FOR LONG-TERM (CURRENT) USE OF MEDICATIONS: ICD-10-CM

## 2018-07-13 DIAGNOSIS — C21.1 MALIGNANT NEOPLASM OF ANAL CANAL (H): ICD-10-CM

## 2018-07-13 LAB
ALBUMIN SERPL-MCNC: 3.4 G/DL (ref 3.4–5)
ALP SERPL-CCNC: 96 U/L (ref 40–150)
ALT SERPL W P-5'-P-CCNC: 18 U/L (ref 0–50)
ANION GAP SERPL CALCULATED.3IONS-SCNC: 5 MMOL/L (ref 3–14)
AST SERPL W P-5'-P-CCNC: 13 U/L (ref 0–45)
BASOPHILS # BLD AUTO: 0 10E9/L (ref 0–0.2)
BASOPHILS NFR BLD AUTO: 0.5 %
BILIRUB SERPL-MCNC: 0.2 MG/DL (ref 0.2–1.3)
BUN SERPL-MCNC: 26 MG/DL (ref 7–30)
CALCIUM SERPL-MCNC: 9.1 MG/DL (ref 8.5–10.1)
CHLORIDE SERPL-SCNC: 106 MMOL/L (ref 94–109)
CO2 SERPL-SCNC: 27 MMOL/L (ref 20–32)
CREAT SERPL-MCNC: 1.29 MG/DL (ref 0.52–1.04)
DIFFERENTIAL METHOD BLD: ABNORMAL
EOSINOPHIL # BLD AUTO: 0.2 10E9/L (ref 0–0.7)
EOSINOPHIL NFR BLD AUTO: 3.2 %
ERYTHROCYTE [DISTWIDTH] IN BLOOD BY AUTOMATED COUNT: 17.2 % (ref 10–15)
GFR SERPL CREATININE-BSD FRML MDRD: 41 ML/MIN/1.7M2
GLUCOSE SERPL-MCNC: 95 MG/DL (ref 70–99)
HCT VFR BLD AUTO: 41 % (ref 35–47)
HGB BLD-MCNC: 13.2 G/DL (ref 11.7–15.7)
IMM GRANULOCYTES # BLD: 0 10E9/L (ref 0–0.4)
IMM GRANULOCYTES NFR BLD: 0.3 %
LYMPHOCYTES # BLD AUTO: 0.5 10E9/L (ref 0.8–5.3)
LYMPHOCYTES NFR BLD AUTO: 7.2 %
MCH RBC QN AUTO: 30.2 PG (ref 26.5–33)
MCHC RBC AUTO-ENTMCNC: 32.2 G/DL (ref 31.5–36.5)
MCV RBC AUTO: 94 FL (ref 78–100)
MONOCYTES # BLD AUTO: 0.7 10E9/L (ref 0–1.3)
MONOCYTES NFR BLD AUTO: 10.7 %
NEUTROPHILS # BLD AUTO: 4.9 10E9/L (ref 1.6–8.3)
NEUTROPHILS NFR BLD AUTO: 78.1 %
NRBC # BLD AUTO: 0 10*3/UL
NRBC BLD AUTO-RTO: 0 /100
PLATELET # BLD AUTO: 228 10E9/L (ref 150–450)
POTASSIUM SERPL-SCNC: 3.9 MMOL/L (ref 3.4–5.3)
PROT SERPL-MCNC: 7.6 G/DL (ref 6.8–8.8)
RBC # BLD AUTO: 4.37 10E12/L (ref 3.8–5.2)
SIROLIMUS BLD-MCNC: 4.1 UG/L (ref 5–15)
SODIUM SERPL-SCNC: 138 MMOL/L (ref 133–144)
TME LAST DOSE: ABNORMAL H
WBC # BLD AUTO: 6.3 10E9/L (ref 4–11)

## 2018-07-14 ENCOUNTER — HEALTH MAINTENANCE LETTER (OUTPATIENT)
Age: 68
End: 2018-07-14

## 2018-07-15 LAB
EBV DNA # SPEC NAA+PROBE: ABNORMAL {COPIES}/ML
EBV DNA SPEC NAA+PROBE-LOG#: 4.8 {LOG_COPIES}/ML

## 2018-07-16 ENCOUNTER — DOCUMENTATION ONLY (OUTPATIENT)
Dept: TRANSPLANT | Facility: CLINIC | Age: 68
End: 2018-07-16

## 2018-07-17 ENCOUNTER — DOCUMENTATION ONLY (OUTPATIENT)
Dept: TRANSPLANT | Facility: CLINIC | Age: 68
End: 2018-07-17

## 2018-07-17 ENCOUNTER — TRANSFERRED RECORDS (OUTPATIENT)
Dept: HEALTH INFORMATION MANAGEMENT | Facility: CLINIC | Age: 68
End: 2018-07-17

## 2018-07-17 NOTE — PROGRESS NOTES
Message  Received: Today       Hitesh See MD Withrow, Angela, RN                     Increased EBV viremia and recommend rechecking EBV PCR in a month.  Will continue on low immunosuppression.

## 2018-07-19 DIAGNOSIS — R19.7 DIARRHEA, UNSPECIFIED TYPE: ICD-10-CM

## 2018-07-19 DIAGNOSIS — I10 HTN (HYPERTENSION): Primary | ICD-10-CM

## 2018-07-19 DIAGNOSIS — Z94.0 KIDNEY REPLACED BY TRANSPLANT: ICD-10-CM

## 2018-07-19 DIAGNOSIS — I73.9 PERIPHERAL ARTERY DISEASE (H): ICD-10-CM

## 2018-07-19 DIAGNOSIS — E78.00 HYPERCHOLESTEREMIA: ICD-10-CM

## 2018-07-19 RX ORDER — SIROLIMUS 1 MG/1
2 TABLET, FILM COATED ORAL DAILY
Qty: 180 TABLET | Refills: 3 | Status: SHIPPED | OUTPATIENT
Start: 2018-07-19 | End: 2021-04-02

## 2018-07-19 RX ORDER — NIFEDIPINE 90 MG/1
90 TABLET, EXTENDED RELEASE ORAL DAILY
Qty: 90 TABLET | Refills: 3 | Status: SHIPPED | OUTPATIENT
Start: 2018-07-19 | End: 2018-08-07

## 2018-07-19 NOTE — TELEPHONE ENCOUNTER
rapamune 1mg   Last FIlled Date 6/21  Qty dispensed 60  Thank you very kindly!  Monse Aponte [facility]

## 2018-07-20 RX ORDER — ATORVASTATIN CALCIUM 20 MG/1
TABLET, FILM COATED ORAL
Qty: 90 TABLET | Refills: 3 | Status: SHIPPED | OUTPATIENT
Start: 2018-07-20 | End: 2018-08-07

## 2018-07-20 RX ORDER — LACTOBACILLUS RHAMNOSUS GG 10B CELL
CAPSULE ORAL
Qty: 100 CAPSULE | Refills: 0 | Status: SHIPPED | OUTPATIENT
Start: 2018-07-20 | End: 2019-03-13

## 2018-07-20 RX ORDER — CLOPIDOGREL BISULFATE 75 MG/1
75 TABLET ORAL DAILY
Qty: 30 TABLET | Refills: 0 | Status: SHIPPED | OUTPATIENT
Start: 2018-07-20 | End: 2018-08-07

## 2018-07-20 RX ORDER — CILOSTAZOL 100 MG/1
100 TABLET ORAL 2 TIMES DAILY
Qty: 60 TABLET | Refills: 0 | Status: SHIPPED | OUTPATIENT
Start: 2018-07-20 | End: 2018-08-07

## 2018-07-20 NOTE — TELEPHONE ENCOUNTER
"Medication is being filled for 1 time refill only due to:  Patient needs to be seen because due for OV.   Future OV 8/6.  Monse Hopson RN    Requested Prescriptions   Signed Prescriptions Disp Refills     cilostazol (PLETAL) 100 MG tablet 60 tablet 0     Sig: Take 1 tablet (100 mg) by mouth 2 times daily    There is no refill protocol information for this order        clopidogrel (PLAVIX) 75 MG tablet 30 tablet 0     Sig: Take 1 tablet (75 mg) by mouth daily    Plavix Passed    7/19/2018  1:14 PM       Passed - No active PPI on record unless is Protonix       Passed - Normal HGB on file in past 12 months    Recent Labs   Lab Test  07/13/18   0930   HGB  13.2              Passed - Normal Platelets on file in past 12 months    Recent Labs   Lab Test  07/13/18   0930   PLT  228              Passed - Recent (12 mo) or future (30 days) visit within the authorizing provider's specialty    Patient had office visit in the last 12 months or has a visit in the next 30 days with authorizing provider or within the authorizing provider's specialty.  See \"Patient Info\" tab in inbasket, or \"Choose Columns\" in Meds & Orders section of the refill encounter.           Passed - Patient is age 18 or older       Passed - No active pregnancy on record       Passed - No positive pregnancy test in past 12 months        Lactobacillus-Inulin ([facility] DIGESTIVE HEALTH) CAPS 100 capsule 0     Sig: TAKE ONE CAPSULE BY MOUTH EVERY DAY    There is no refill protocol information for this order            "

## 2018-08-07 ENCOUNTER — OFFICE VISIT (OUTPATIENT)
Dept: FAMILY MEDICINE | Facility: CLINIC | Age: 68
End: 2018-08-07
Payer: COMMERCIAL

## 2018-08-07 VITALS
SYSTOLIC BLOOD PRESSURE: 136 MMHG | BODY MASS INDEX: 18.48 KG/M2 | HEIGHT: 62 IN | TEMPERATURE: 97.1 F | OXYGEN SATURATION: 97 % | WEIGHT: 100.4 LBS | DIASTOLIC BLOOD PRESSURE: 72 MMHG | HEART RATE: 76 BPM

## 2018-08-07 DIAGNOSIS — Z23 VACCINE FOR SINGLE BACTERIAL DISEASE: Primary | ICD-10-CM

## 2018-08-07 DIAGNOSIS — I73.9 PERIPHERAL ARTERY DISEASE (H): ICD-10-CM

## 2018-08-07 DIAGNOSIS — Z11.59 NEED FOR HEPATITIS C SCREENING TEST: ICD-10-CM

## 2018-08-07 DIAGNOSIS — I12.9 RENAL HYPERTENSION, STAGE 1-4 OR UNSPECIFIED CHRONIC KIDNEY DISEASE: ICD-10-CM

## 2018-08-07 DIAGNOSIS — Z79.52 CURRENT CHRONIC USE OF SYSTEMIC STEROIDS: ICD-10-CM

## 2018-08-07 DIAGNOSIS — Z98.890 S/P LEEP OF CERVIX: ICD-10-CM

## 2018-08-07 DIAGNOSIS — D84.9 IMMUNOSUPPRESSED STATUS (H): ICD-10-CM

## 2018-08-07 DIAGNOSIS — E78.00 HYPERCHOLESTEREMIA: ICD-10-CM

## 2018-08-07 DIAGNOSIS — C21.1 MALIGNANT NEOPLASM OF ANAL CANAL (H): ICD-10-CM

## 2018-08-07 DIAGNOSIS — I15.1 HYPERTENSION SECONDARY TO OTHER RENAL DISORDERS: ICD-10-CM

## 2018-08-07 DIAGNOSIS — Z94.0 KIDNEY REPLACED BY TRANSPLANT: ICD-10-CM

## 2018-08-07 PROCEDURE — 90670 PCV13 VACCINE IM: CPT | Performed by: FAMILY MEDICINE

## 2018-08-07 PROCEDURE — 99214 OFFICE O/P EST MOD 30 MIN: CPT | Mod: 25 | Performed by: FAMILY MEDICINE

## 2018-08-07 PROCEDURE — 90471 IMMUNIZATION ADMIN: CPT | Performed by: FAMILY MEDICINE

## 2018-08-07 RX ORDER — CILOSTAZOL 100 MG/1
100 TABLET ORAL 2 TIMES DAILY
Qty: 60 TABLET | Refills: 11 | Status: SHIPPED | OUTPATIENT
Start: 2018-08-07 | End: 2019-08-21

## 2018-08-07 RX ORDER — ATORVASTATIN CALCIUM 20 MG/1
20 TABLET, FILM COATED ORAL DAILY
Qty: 30 TABLET | Refills: 11 | Status: SHIPPED | OUTPATIENT
Start: 2018-08-07 | End: 2019-08-21

## 2018-08-07 RX ORDER — CLOPIDOGREL BISULFATE 75 MG/1
75 TABLET ORAL DAILY
Qty: 30 TABLET | Refills: 11 | Status: SHIPPED | OUTPATIENT
Start: 2018-08-07 | End: 2019-08-21

## 2018-08-07 RX ORDER — NIFEDIPINE 90 MG/1
90 TABLET, EXTENDED RELEASE ORAL DAILY
Qty: 30 TABLET | Refills: 5 | Status: SHIPPED | OUTPATIENT
Start: 2018-08-07 | End: 2019-02-20

## 2018-08-07 RX ORDER — LOSARTAN POTASSIUM 25 MG/1
TABLET ORAL
Qty: 15 TABLET | Refills: 5 | Status: SHIPPED | OUTPATIENT
Start: 2018-08-07 | End: 2018-10-30

## 2018-08-07 RX ORDER — METOPROLOL TARTRATE 100 MG
100 TABLET ORAL 2 TIMES DAILY
Qty: 60 TABLET | Refills: 5 | Status: SHIPPED | OUTPATIENT
Start: 2018-08-07 | End: 2019-03-20
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
## 2018-08-07 NOTE — NURSING NOTE
"Chief Complaint   Patient presents with     RECHECK     f/u after finishing chemo 06/11/2018     /72  Pulse 76  Temp 97.1  F (36.2  C) (Oral)  Ht 5' 2\" (1.575 m)  Wt 100 lb 6.4 oz (45.5 kg)  SpO2 97%  BMI 18.36 kg/m2 Estimated body mass index is 18.36 kg/(m^2) as calculated from the following:    Height as of this encounter: 5' 2\" (1.575 m).    Weight as of this encounter: 100 lb 6.4 oz (45.5 kg).        Health Maintenance due pending provider review:  Will discuss Hm with DEBBIE Mays CMA  "

## 2018-08-07 NOTE — PROGRESS NOTES
SUBJECTIVE:   Maribel Yang is a 67 year old female who presents to clinic today for the following health issues:      Chief Complaint   Patient presents with     RECHECK     f/u after finishing chemo 06/11/2018     Pt is here today to f/u on chronic illness    Anal cancer  -   States she has completed radiation x6 weeks and chemotherapy -   She has lost weight and has decreased appetite   Will add ensure    Radiation was painful but symptoms are improving  Some initially feeling down and depressed but feels like this is lifting.      Vaginal YUNIOR  Has appt this month for repeat pap and colposcopy    Skin -   Gets q6 month checks  Due to immune suppressive medications from kidney transplant    EBV - viral load was increasing.  Is scheduled to have repeat this Friday to see if trending up or going down.    Chemo started 4/30/18 and completed on 6/11/18    Creatinine - kidney stable    Cardiac -    Stable on her meds  Will make appt with cardiology    -------------------------------------    Problem list and histories reviewed & adjusted, as indicated.  Additional history: as documented    Patient Active Problem List   Diagnosis     Other specified congenital anomalies     Kidney replaced by transplant     S/P LEEP of cervix     CARDIOVASCULAR SCREENING; LDL GOAL LESS THAN 100     Immunosuppressed status (H)     Hypertension secondary to other renal disorders     History of basal cell carcinoma     YUNIOR III (vulvar intraepithelial neoplasia III)     Peripheral artery disease (H)     Squamous cell lung cancer (H)     Lumbago     Vaginal dysplasia     Alopecia     Cherry angioma     Skin cancer screening     Intertrigo     History of basal cell carcinoma     Malignant neoplasm of anal canal (H)     Aftercare following organ transplant     Past Surgical History:   Procedure Laterality Date     BIOPSY ANAL N/A 3/14/2018    Procedure: BIOPSY ANAL;;  Surgeon: Shabbir Leo MD;  Location:  OR      NONSPECIFIC  PROCEDURE      Thrombectomy     C NONSPECIFIC PROCEDURE  1955 and 1959    Bilater eye surgery - correction for crossed eyes     C NONSPECIFIC PROCEDURE  1998    oopherectomy L     C NONSPECIFIC PROCEDURE  1967    open kidney biopsy - L     C TRANSPLANTATION OF KIDNEY  9/04    recipient -- done at [facility]     COLONOSCOPY       COLONOSCOPY N/A 8/9/2017    Procedure: COMBINED COLONOSCOPY, SINGLE OR MULTIPLE BIOPSY/POLYPECTOMY BY BIOPSY;;  Surgeon: Sushil Hyatt MD;  Location: [facility]     COLPOSCOPY,LOOP ELECTRD CERVIX EXCIS  03/11/08    TAL II     CONIZATION LEEP  7/17/2013    Procedure: CONIZATION LEEP;;  Surgeon: Liliana Renteria MD;  [facility]: [facility]     CONIZATION LEEP N/A 8/17/2016    Procedure: CONIZATION LEEP;  Surgeon: Liliana Renteria MD;  Location: [facility]     EXAM UNDER ANESTHESIA ANUS  7/15/2014    Procedure: EXAM UNDER ANESTHESIA ANUS;  Surgeon: Radha Musa MD;  Location[facility]     EXAM UNDER ANESTHESIA ANUS N/A 3/14/2018    Procedure: EXAM UNDER ANESTHESIA ANUS;  Anal Exam Under Anesthesia With Excision of anal lesion, proctoscopy;  Surgeon: Shabbir Leo MD;  Location: [facility]     EYE SURGERY       LASER CO2 EXCISE VULVA WIDE LOCAL  7/15/2014    Procedure: LASER CO2 EXCISE VULVA WIDE LOCAL;  Surgeon: Liliana Renteria MD;  [facility]     LASER CO2 VAGINA  7/17/2013    Procedure: LASER CO2 VAGINA;;  Surgeon: Liliana Renteria MD;  [facility]     MICROSCOPY ANAL  7/17/2013    Procedure: MICROSCOPY ANAL;  Anal Microscopy,  EUA vagina,Colposcopy Of Vagina And Vulva, Vaginal Biopsies, Omniguide Co2 Laser To Vagina and vulva, Loop Electrosurgical Excision Procedure To Cervix;  Surgeon: Radha Musa MD;  Location: [facility]     MICROSCOPY ANAL  7/15/2014    Procedure: MICROSCOPY ANAL;  Surgeon: Radha Musa MD;  Location: [facility]       Social History   Substance Use Topics     Smoking status: Former Smoker     Packs/day: 0.30     Years: 35.00      "Types: Cigarettes     Quit date: 2014     Smokeless tobacco: Never Used      Comment: occ cig     Alcohol use 0.0 oz/week     0 Standard drinks or equivalent per week      Comment: rare     Family History   Problem Relation Age of Onset     Diabetes Father      type 2 diag age,60's     Alcohol/Drug Father      Arthritis Father      Hypertension Father      Lipids Father      high cholesterol     Arthritis Mother      Diabetes Mother      Depression Mother      HEART DISEASE Mother      Neurologic Disorder Mother      Obesity Mother      Psychotic Disorder Mother      Thyroid Disease Mother      Gynecology Sister      Precancerous cell removal from cervix at age 45     Depression Sister      Allergies Sister      Alcohol/Drug Sister      Neurologic Disorder Sister      Cerebrovascular Disease Paternal Grandmother       of a stroke in her 80's     Diabetes Paternal Grandmother      Alcohol/Drug Son      Colon Polyps Sister      Colon Cancer No family hx of      Crohn Disease No family hx of      Ulcerative Colitis No family hx of            Reviewed and updated as needed this visit by clinical staff  Tobacco  Allergies  Meds  Problems       Reviewed and updated as needed this visit by Provider  Allergies  Meds  Problems         ROS:  Constitutional, HEENT, cardiovascular, pulmonary, GI, , musculoskeletal, neuro, skin, endocrine and psych systems are negative, except as otherwise noted.    OBJECTIVE:     /72  Pulse 76  Temp 97.1  F (36.2  C) (Oral)  Ht 5' 2\" (1.575 m)  Wt 100 lb 6.4 oz (45.5 kg)  SpO2 97%  BMI 18.36 kg/m2  Body mass index is 18.36 kg/(m^2).  GENERAL: alert and no distress  RESP: lungs clear to auscultation - no rales, rhonchi or wheezes  CV: regular rates and rhythm, normal S1 S2, no S3 or S4 and mild 1+ pitting edema in feet    Diagnostic Test Results:  future    ASSESSMENT/PLAN:     1. Peripheral artery disease (H)  PAD - will refill the plavix and pletal  - clopidogrel " (PLAVIX) 75 MG tablet; Take 1 tablet (75 mg) by mouth daily  Dispense: 30 tablet; Refill: 11  - cilostazol (PLETAL) 100 MG tablet; Take 1 tablet (100 mg) by mouth 2 times daily  Dispense: 60 tablet; Refill: 11    2. 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 Hypertension secondary to other renal disorders     - metoprolol tartrate (LOPRESSOR) 100 MG tablet; Take 1 tablet (100 mg) by mouth 2 times daily  Dispense: 60 tablet; Refill: 5  - NIFEdipine ER osmotic (PROCARDIA XL) 90 MG 24 hr tablet; Take 1 tablet (90 mg) by mouth daily  Dispense: 30 tablet; Refill: 5    4. Hypercholesteremia     - atorvastatin (LIPITOR) 20 MG tablet; Take 1 tablet (20 mg) by mouth daily  Dispense: 30 tablet; Refill: 11  - Lipid panel reflex to direct LDL Non-fasting; Future    5. Vaccine for single bacterial disease  given  - Pneumococcal vaccine 13 valent PCV13 IM (Prevnar) [85458]  - ADMIN: Vaccine, Initial (42065)    6. Current chronic use of systemic steroids  Due for a DEXA - has never had before  - DX Hip/Pelvis/Spine; Future    7. Need for hepatitis C screening test     - **Hepatitis C Screen Reflex to RNA FUTURE anytime; Future    8. Kidney replaced by transplant       9. Immunosuppressed status (H)   EBV viral load going up - unclear if meds or if chemotherapy  Will recheck this week    10. S/P LEEP of cervix   working with gyn onc  Has upcoming appt    11. Malignant neoplasm of anal canal (H)  Just completed chemo/radiation  Will have MRI and CT this week.      Pt will call or RTC if symptoms worsen or do not improve.      Lisa Martinez, [facility]

## 2018-08-08 ENCOUNTER — DOCUMENTATION ONLY (OUTPATIENT)
Dept: TRANSPLANT | Facility: CLINIC | Age: 68
End: 2018-08-08

## 2018-08-08 NOTE — PROGRESS NOTES
RE: IS  Received: Today       Hitesh See MD [place], Angela, RN                     Yes, but let's slightly increase sirolimus goa 4-6.     Thanks.            Previous Messages       ----- Message -----      From: Radha Cooper, RN      Sent: 7/6/2018   1:08 PM        To: Hitesh See MD   Subject: IS                                               Hi, just wanted to follow up with you - she is no longer being treated for anal squam cell ca. Are you ok with her on only rap (3-5) and pred still?

## 2018-08-10 ENCOUNTER — RADIANT APPOINTMENT (OUTPATIENT)
Dept: CT IMAGING | Facility: CLINIC | Age: 68
End: 2018-08-10
Attending: INTERNAL MEDICINE
Payer: COMMERCIAL

## 2018-08-10 ENCOUNTER — RADIANT APPOINTMENT (OUTPATIENT)
Dept: MRI IMAGING | Facility: CLINIC | Age: 68
End: 2018-08-10
Attending: INTERNAL MEDICINE
Payer: COMMERCIAL

## 2018-08-10 DIAGNOSIS — C21.1 MALIGNANT NEOPLASM OF ANAL CANAL (H): ICD-10-CM

## 2018-08-10 DIAGNOSIS — E78.00 HYPERCHOLESTEREMIA: ICD-10-CM

## 2018-08-10 DIAGNOSIS — Z94.0 KIDNEY REPLACED BY TRANSPLANT: ICD-10-CM

## 2018-08-10 DIAGNOSIS — Z79.899 ENCOUNTER FOR LONG-TERM (CURRENT) USE OF MEDICATIONS: ICD-10-CM

## 2018-08-10 DIAGNOSIS — Z11.59 NEED FOR HEPATITIS C SCREENING TEST: ICD-10-CM

## 2018-08-10 LAB
ALBUMIN SERPL-MCNC: 3.2 G/DL (ref 3.4–5)
ALP SERPL-CCNC: 102 U/L (ref 40–150)
ALT SERPL W P-5'-P-CCNC: 18 U/L (ref 0–50)
ANION GAP SERPL CALCULATED.3IONS-SCNC: 7 MMOL/L (ref 3–14)
AST SERPL W P-5'-P-CCNC: 14 U/L (ref 0–45)
BASOPHILS # BLD AUTO: 0 10E9/L (ref 0–0.2)
BASOPHILS NFR BLD AUTO: 0.2 %
BILIRUB SERPL-MCNC: 0.3 MG/DL (ref 0.2–1.3)
BUN SERPL-MCNC: 18 MG/DL (ref 7–30)
CALCIUM SERPL-MCNC: 9.2 MG/DL (ref 8.5–10.1)
CHLORIDE SERPL-SCNC: 108 MMOL/L (ref 94–109)
CHOLEST SERPL-MCNC: 161 MG/DL
CO2 SERPL-SCNC: 24 MMOL/L (ref 20–32)
CREAT SERPL-MCNC: 1.24 MG/DL (ref 0.52–1.04)
DIFFERENTIAL METHOD BLD: ABNORMAL
EOSINOPHIL # BLD AUTO: 0 10E9/L (ref 0–0.7)
EOSINOPHIL NFR BLD AUTO: 0.6 %
ERYTHROCYTE [DISTWIDTH] IN BLOOD BY AUTOMATED COUNT: 15 % (ref 10–15)
GFR SERPL CREATININE-BSD FRML MDRD: 43 ML/MIN/1.7M2
GLUCOSE SERPL-MCNC: 108 MG/DL (ref 70–99)
HCT VFR BLD AUTO: 39.5 % (ref 35–47)
HCV AB SERPL QL IA: NONREACTIVE
HDLC SERPL-MCNC: 53 MG/DL
HGB BLD-MCNC: 13.3 G/DL (ref 11.7–15.7)
IMM GRANULOCYTES # BLD: 0 10E9/L (ref 0–0.4)
IMM GRANULOCYTES NFR BLD: 0.3 %
LDLC SERPL CALC-MCNC: 71 MG/DL
LYMPHOCYTES # BLD AUTO: 0.2 10E9/L (ref 0.8–5.3)
LYMPHOCYTES NFR BLD AUTO: 3.7 %
MCH RBC QN AUTO: 30.7 PG (ref 26.5–33)
MCHC RBC AUTO-ENTMCNC: 33.7 G/DL (ref 31.5–36.5)
MCV RBC AUTO: 91 FL (ref 78–100)
MONOCYTES # BLD AUTO: 0.3 10E9/L (ref 0–1.3)
MONOCYTES NFR BLD AUTO: 4.7 %
NEUTROPHILS # BLD AUTO: 5.6 10E9/L (ref 1.6–8.3)
NEUTROPHILS NFR BLD AUTO: 90.5 %
NONHDLC SERPL-MCNC: 108 MG/DL
NRBC # BLD AUTO: 0 10*3/UL
NRBC BLD AUTO-RTO: 0 /100
PLATELET # BLD AUTO: 222 10E9/L (ref 150–450)
POTASSIUM SERPL-SCNC: 3.7 MMOL/L (ref 3.4–5.3)
PROT SERPL-MCNC: 7.4 G/DL (ref 6.8–8.8)
RBC # BLD AUTO: 4.33 10E12/L (ref 3.8–5.2)
SODIUM SERPL-SCNC: 138 MMOL/L (ref 133–144)
TRIGL SERPL-MCNC: 181 MG/DL
WBC # BLD AUTO: 6.2 10E9/L (ref 4–11)

## 2018-08-10 RX ORDER — GADOBUTROL 604.72 MG/ML
7.5 INJECTION INTRAVENOUS ONCE
Status: COMPLETED | OUTPATIENT
Start: 2018-08-10 | End: 2018-08-10

## 2018-08-10 RX ADMIN — GADOBUTROL 7.5 ML: 604.72 INJECTION INTRAVENOUS at 14:37

## 2018-08-10 NOTE — DISCHARGE INSTRUCTIONS
MRI Contrast Discharge Instructions    The IV contrast you received today will pass out of your body in your  urine. This will happen in the next 24 hours. You will not feel this process.  Your urine will not change color.    Drink at least 4 extra glasses of water or juice today (unless your doctor  has restricted your fluids). This reduces the stress on your kidneys.  You may take your regular medicines.    If you are on dialysis: It is best to have dialysis today.    If you have a reaction: Most reactions happen right away. If you have  any new symptoms after leaving the hospital (such as hives or swelling),  call your hospital at the correct number below. Or call your family doctor.  If you have breathing distress or wheezing, call 911.    Special instructions: ***    I have read and understand the above information.    Signature:______________________________________ Date:___________    Staff:__________________________________________ Date:___________     Time:__________    [facility] Departments:    ___[place]: [phone]  ___[facility]: [phone]  ___[place]: [phone] ___[facility]: [phone]  ___[facility]: [phone]  ___[facility]: [phone]  ___[phone]  ___[facility]: [phone]  ___[phone]

## 2018-08-13 ENCOUNTER — TELEPHONE (OUTPATIENT)
Dept: TRANSPLANT | Facility: CLINIC | Age: 68
End: 2018-08-13

## 2018-08-13 ENCOUNTER — ONCOLOGY VISIT (OUTPATIENT)
Dept: ONCOLOGY | Facility: CLINIC | Age: 68
End: 2018-08-13
Attending: INTERNAL MEDICINE
Payer: COMMERCIAL

## 2018-08-13 VITALS
BODY MASS INDEX: 17.94 KG/M2 | TEMPERATURE: 98.7 F | HEIGHT: 62 IN | OXYGEN SATURATION: 98 % | SYSTOLIC BLOOD PRESSURE: 124 MMHG | HEART RATE: 82 BPM | WEIGHT: 97.5 LBS | DIASTOLIC BLOOD PRESSURE: 71 MMHG | RESPIRATION RATE: 18 BRPM

## 2018-08-13 DIAGNOSIS — C21.1 MALIGNANT NEOPLASM OF ANAL CANAL (H): Primary | ICD-10-CM

## 2018-08-13 LAB
EBV DNA # SPEC NAA+PROBE: 4494 {COPIES}/ML
EBV DNA SPEC NAA+PROBE-LOG#: 3.7 {LOG_COPIES}/ML

## 2018-08-13 PROCEDURE — G0463 HOSPITAL OUTPT CLINIC VISIT: HCPCS | Mod: ZF

## 2018-08-13 PROCEDURE — 99214 OFFICE O/P EST MOD 30 MIN: CPT | Mod: ZP | Performed by: INTERNAL MEDICINE

## 2018-08-13 ASSESSMENT — PAIN SCALES - GENERAL: PAINLEVEL: NO PAIN (0)

## 2018-08-13 NOTE — TELEPHONE ENCOUNTER
Hello,   I can no longer afford to take Sirolimus.  They are charging  $291 for copay for this medication. Is there an alternative?   Maribel      PLAN:  Call placed to pt.
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
## 2018-08-13 NOTE — NURSING NOTE
"Oncology Rooming Note    August 13, 2018 2:04 PM   Maribel Yang is a 67 year old female who presents for:    Chief Complaint   Patient presents with     Oncology Clinic Visit     Return visit related to Anal Cancer     Initial Vitals: /71 (BP Location: Left arm, Patient Position: Sitting, Cuff Size: Child)  Pulse 82  Temp 98.7  F (37.1  C) (Tympanic)  Resp 18  Ht 1.575 m (5' 2.01\")  Wt 44.2 kg (97 lb 8 oz)  SpO2 98%  BMI 17.83 kg/m2 Estimated body mass index is 17.83 kg/(m^2) as calculated from the following:    Height as of this encounter: 1.575 m (5' 2.01\").    Weight as of this encounter: 44.2 kg (97 lb 8 oz). Body surface area is 1.39 meters squared.  No Pain (0) Comment: Data Unavailable   No LMP recorded. Patient is postmenopausal.  Allergies reviewed: Yes  Medications reviewed: Yes    Medications: Medication refills not needed today.  Pharmacy name entered into [employer]:    [facility]  [facility] - [place], [place] - [address] [place] [address] PHARMACY [facility] - [place], [place] - [address] [place] [address] [phone]    Clinical concerns: No new concerns. Provider was notified.    10 minutes for nursing intake (face to face time)     Gabriella Cox LPN            "

## 2018-08-14 NOTE — PROGRESS NOTES
Dear Maribel,   Your test results are all back -   -Hep C is negative.  Your cholesterol continues to look good.  Let us know if you have any questions.  -Lisa Martinez, DO

## 2018-08-15 ENCOUNTER — TELEPHONE (OUTPATIENT)
Dept: TRANSPLANT | Facility: CLINIC | Age: 68
End: 2018-08-15

## 2018-08-17 ENCOUNTER — TELEPHONE (OUTPATIENT)
Dept: CARE COORDINATION | Facility: CLINIC | Age: 68
End: 2018-08-17

## 2018-08-17 ENCOUNTER — TELEPHONE (OUTPATIENT)
Dept: SURGERY | Facility: CLINIC | Age: 68
End: 2018-08-17

## 2018-08-17 NOTE — TELEPHONE ENCOUNTER
Called patient to schedule follow up with Dr. Leo s/p chemotherapy.  Patient is scheduled 9/11/18 at 2:00 pm.

## 2018-08-17 NOTE — TELEPHONE ENCOUNTER
Transplant Social Work Services Phone Call      Data: High Sirolimus Copay  Intervention: Patient called to talk about her high sirolimus copay ($291/month). Patient is unsure why her copay is so high but expressed difficulty affording it. Discussed patient applying for [employer] patient assistance program. Will mail paperwork to patient. Offered one time use of transplant funds to help with this month's sirolimus copay.   Assessment: Patient did not have Medicare at time of transplant so patient could be in the Part D donut hole. Unclear at this time but reached out to pharmacy to see if they are able to tell if patient is in the donut hole. If so, this would explain cost being high.   Education provided by SW: [employer] assistance, transplant funds, Part D donut hole  Plan: This writer mailed patient application, patient to return to this writer. This writer also emailed [facility] pharmacy regarding use of transplant funds and Part D.    JOANA Galeana    Kidney/Pancreas/Auto Islet Transplant Programs

## 2018-08-18 ENCOUNTER — HEALTH MAINTENANCE LETTER (OUTPATIENT)
Age: 68
End: 2018-08-18

## 2018-08-21 NOTE — PROGRESS NOTES
ORAL CHEMOTHERAPY DISCONTINUATION       Primary Oncologist:  Dr. Tucker  Primary Oncology Clinic: [facility]  Cancer Diagnosis:  Anal canal carcinoma  Therapy History:  Drug: Xeloda with XRT; 1000 mg (2 tablets) QAM and 500mg (1 tablet) QPM MON-FRI  Start Date: 4/30/18    Therapy Ended On:  6/11/2018  Reason For Discontinuation: therapy completed    Additional Notes:  Thank you for the opportunity to be a part in this patient's oral chemotherapy. The oncology pharmacy will no longer be following this patient for oral chemotherapy. If there are any questions or the plan changes, feel free to contact us.    Rita Angelo, Pharmacy Intern  Oral Chemotherapy Monitoring Program  [facility]  [phone]

## 2018-08-27 NOTE — TELEPHONE ENCOUNTER
Call placed to pt to f/u on Sirolimus status. Message sent to SW about this as pt states that she has not received application in the mail for [employer] assistance, and she also has a bill in hand for this month for $356 that transplant funds are to cover. To f/u again later this week.

## 2018-08-30 ENCOUNTER — OFFICE VISIT (OUTPATIENT)
Dept: DERMATOLOGY | Facility: CLINIC | Age: 68
End: 2018-08-30
Payer: COMMERCIAL

## 2018-08-30 DIAGNOSIS — L82.1 SEBORRHEIC KERATOSIS: ICD-10-CM

## 2018-08-30 DIAGNOSIS — Z85.828 HISTORY OF BASAL CELL CARCINOMA: ICD-10-CM

## 2018-08-30 DIAGNOSIS — D48.5 NEOPLASM OF UNCERTAIN BEHAVIOR OF SKIN: ICD-10-CM

## 2018-08-30 DIAGNOSIS — D84.9 IMMUNOSUPPRESSED STATUS (H): ICD-10-CM

## 2018-08-30 DIAGNOSIS — Z12.83 SKIN CANCER SCREENING: Primary | ICD-10-CM

## 2018-08-30 DIAGNOSIS — D18.01 CHERRY ANGIOMA: ICD-10-CM

## 2018-08-30 ASSESSMENT — PAIN SCALES - GENERAL
PAINLEVEL: NO PAIN (0)
PAINLEVEL: NO PAIN (0)

## 2018-08-30 NOTE — NURSING NOTE
Lidocaine1 % injection   2mL once for one use, starting 8/30/2018 ending 8/30/2018,  2mL disp, R-0, injection  Injected by Melody Blackwood LPN

## 2018-08-30 NOTE — LETTER
8/30/2018       RE: Maribel Yang  [address]  [facility] [id number]     Dear Colleague,    Thank you for referring your patient, Maribel Yang, to the [facility] DERMATOLOGY at [facility]. Please see a copy of my visit note below.        [facility] Dermatology Note      Dermatology Problem List:  0. NUB, left mid thigh- s/p bx 8/30/18  1. Hx ESKD s/p rental transplant 2004  2. BCC right lower leg. Superficial bcc left lower leg, treated with ED &C 7/22/2016  3. Skin cancer screening, 8/30/18     CC:   Chief Complaint   Patient presents with     Derm Problem     Maribel is here for a transplant skin check, has a concerning area on her left thigh.      Encounter Date: Aug 30, 2018    History of Present Illness:  Ms. Maribel Yang is a 67 year old female, with a history of kidney transplantation and NMSC, who presents today for a skin check. The patient was last seen in the dermatology clinic on 1/18/17 during which her total body skin exam was unremarkable.     Today the patient reports an area of concern on her left thigh. This spot is new and pink in color. She denies this area is painful, itchy or bothersome. She thinks it looks odd and is different than her other spots.     Otherwise the patient reports no additional painful, bleeding, nonhealing or pruritic lesions and denies any new or changing moles.    Past Medical History:   Patient Active Problem List   Diagnosis     Other specified congenital anomalies     Kidney replaced by transplant     S/P LEEP of cervix     CARDIOVASCULAR SCREENING; LDL GOAL LESS THAN 100     Immunosuppressed status (H)     Hypertension secondary to other renal disorders     History of basal cell carcinoma     YUNIOR III (vulvar intraepithelial neoplasia III)     Peripheral artery disease (H)     Squamous cell lung cancer (H)     Lumbago     Vaginal dysplasia     Alopecia     Cherry angioma     Skin cancer  screening     Intertrigo     History of basal cell carcinoma     Malignant neoplasm of anal canal (H)     Aftercare following organ transplant     Past Medical History:   Diagnosis Date     Abnormal coagulation profile     p [id number] heterozygote      Abnormal Papanicolaou smear of cervix and cervical HPV     Many years ago -- had colposcopies -- normal for years     Anal dysplasia      Anemia      Antiplatelet or antithrombotic long-term use      ASCUS with positive high risk HPV 2007, 2015    + HPV 56, 54,& 6, colp - TAL II, Leep =TAL II     Difficulty in walking(719.7)      High risk medication use      Hypertension      Immunosuppressed status (H)      Kidney replaced by transplant 9/04    Living donor recipient,  Rejection 7/2005     LSIL (low grade squamous intraepithelial lesion) on Pap smear 4/2013    +HPV 33 or 45, 61       Migraine, unspecified, without mention of intractable migraine without mention of status migrainosus      NONSPECIFIC MEDICAL HISTORY     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     Other acute embolism veins      Other specified viral warts 2007    Rectal warts -- HPV type 6 -- low risk     PAD (peripheral artery disease) (H)      PONV (postoperative nausea and vomiting)      Renal disease      Squamous cell lung cancer (H)      Thrombosis of leg      Unspecified disorder of kidney and ureter     X-linked dominant Alport's syndrome.     Past Surgical History:   Procedure Laterality Date     BIOPSY ANAL N/A 3/14/2018    Procedure: BIOPSY ANAL;;  Surgeon: Shabbir Leo MD;  Location:  OR      NONSPECIFIC PROCEDURE      Thrombectomy     C NONSPECIFIC PROCEDURE  1955 and 1959    Bilater eye surgery - correction for crossed eyes     C NONSPECIFIC PROCEDURE  1998    oopherectomy L     C NONSPECIFIC PROCEDURE  1967    open kidney biopsy - L     C TRANSPLANTATION OF KIDNEY  9/04    recipient -- done at [facility]     COLONOSCOPY       COLONOSCOPY  N/A 8/9/2017    Procedure: COMBINED COLONOSCOPY, SINGLE OR MULTIPLE BIOPSY/POLYPECTOMY BY BIOPSY;;  Surgeon: Sushil Hyatt MD;  [facility] GI     COLPOSCOPY,LOOP ELECTRD CERVIX EXCIS  03/11/08    TAL II     CONIZATION LEEP  7/17/2013    Procedure: CONIZATION LEEP;;  Surgeon: Liliana Renteria MD;  [facility]     CONIZATION LEEP N/A 8/17/2016    Procedure: CONIZATION LEEP;  Surgeon: Liliana Renteria MD;  [facility]     EXAM UNDER ANESTHESIA ANUS  7/15/2014    Procedure: EXAM UNDER ANESTHESIA ANUS;  Surgeon: Radha Musa MD;  [facility]     EXAM UNDER ANESTHESIA ANUS N/A 3/14/2018    Procedure: EXAM UNDER ANESTHESIA ANUS;  Anal Exam Under Anesthesia With Excision of anal lesion, proctoscopy;  Surgeon: Shabbir Leo MD;  [facility]     EYE SURGERY       LASER CO2 EXCISE VULVA WIDE LOCAL  7/15/2014    Procedure: LASER CO2 EXCISE VULVA WIDE LOCAL;  Surgeon: Liliana Renteria MD;  [facility]     LASER CO2 VAGINA  7/17/2013    Procedure: LASER CO2 VAGINA;;  Surgeon: Liliana Renteria MD;  [facility]     MICROSCOPY ANAL  7/17/2013    Procedure: MICROSCOPY ANAL;  Anal Microscopy,  EUA vagina,Colposcopy Of Vagina And Vulva, Vaginal Biopsies, Omniguide Co2 Laser To Vagina and vulva, Loop Electrosurgical Excision Procedure To Cervix;  Surgeon: Radha Musa MD;  [facility]     MICROSCOPY ANAL  7/15/2014    Procedure: MICROSCOPY ANAL;  Surgeon: Radha Musa MD;  [facility] OR       Social History:  The patient does not work, looking for a job. The patient denies use of tanning beds.    Medications:  Current Outpatient Prescriptions   Medication Sig Dispense Refill     ACETAMINOPHEN PO Take 1-2 tablets by mouth every 8 hours as needed for pain       acetaminophen-codeine (TYLENOL WITH CODEINE #3) 300-30 MG per tablet Take 1-2 tablets by mouth every 6 hours as needed for pain 50 tablet 0     amoxicillin (AMOXIL) 500 MG capsule  Take 4 capsules (2,000 mg) by mouth once as needed Prior to dental procedures 16 capsule 3     atorvastatin (LIPITOR) 20 MG tablet Take 1 tablet (20 mg) by mouth daily 30 tablet 11     CALCIUM 500 MG OR TABS 1 tab bid (Patient taking differently: Take 1 tablet by mouth twice daily)  0     cilostazol (PLETAL) 100 MG tablet Take 1 tablet (100 mg) by mouth 2 times daily 60 tablet 11     clopidogrel (PLAVIX) 75 MG tablet Take 1 tablet (75 mg) by mouth daily 30 tablet 11     Lactobacillus-Inulin ([facility] DIGESTIVE [facility]) CAPS TAKE ONE CAPSULE BY MOUTH EVERY  capsule 0     losartan (COZAAR) 25 MG tablet TAKE ONE-HALF TABLET (12.5MG) BY MOUTH EVERY DAY 15 tablet 5     metoprolol tartrate (LOPRESSOR) 100 MG tablet Take 1 tablet (100 mg) by mouth 2 times daily 60 tablet 5     NIFEdipine ER osmotic (PROCARDIA XL) 90 MG 24 hr tablet Take 1 tablet (90 mg) by mouth daily 30 tablet 5     ORDER FOR DME Equipment being ordered: TENS 1 Device 0     predniSONE (DELTASONE) 5 MG tablet Take 1 tablet (5 mg) by mouth daily 90 tablet 3     prochlorperazine (COMPAZINE) 10 MG tablet Take 1 tablet (10 mg) by mouth every 6 hours as needed (Nausea/Vomiting) 30 tablet 2     sirolimus (GENERIC EQUIVALENT) 1 MG tablet Take 2 tablets (2 mg) by mouth daily 180 tablet 3     Allergies   Allergen Reactions     Ultracet Nausea and Vomiting and Hives     Fentanyl Nausea     Hydrocodone Nausea and Vomiting and Hives         Review of Systems:  -Skin Establ Pt: The patient denies any new rash, pruritus, or lesions that are symptomatic, changing or bleeding, except as per HPI.  -Constitutional: The patient denies fatigue, fevers, chills, unintended weight loss, and night sweats. She is feeling generally well.  -HEENT: Patient denies nonhealing oral sores.  -Skin: As above in HPI. No additional skin concerns.    Physical exam:  Vitals: There were no vitals taken for this visit.  GEN: This is a well developed, well-nourished female in no acute  distress, in a pleasant mood.    SKIN: Full skin, which includes the head/face, both arms, chest, back, abdomen,both legs, groin, buttocks, digits and/or nails, was examined. Significant for:   -There are bright red some shaped papules scattered on the trunk.   -There is no erythema, telangectasias, nodularity, or pigmentation on the right lower leg or left lower leg.  - There are scattered brown stuck on waxy papules on the trunk  - There is a 4 mm pink scaly papule to the left mid thigh   -No other lesions of concern on areas examined.     Impression/Plan:  1. Hx NMSC, superficial bcc left lower leg, treated with ED &C 7/22/2016    No signs of recurrence upon physical examination today     Recommend regular skin cancer screenings     Recommend regular use of sun protection with emphasis on reapplication     ABCD's of melanoma were reviewed with patient and handout provided.      2. Neoplasm of uncertain behavior to the left mid thigh. Differential diagnosis to include: superficial bcc vs inflamed seborrheic keratosis vs other  After discussion of benefits and risks including but not limited to bleeding, infection, scar, incomplete removal, recurrence, and non-diagnostic biopsy, written consent and photographs were obtained. The area was cleaned with isopropyl alcohol. 2.0 mL of 1% lidocaine with epinephrine was injected to obtain adequate anesthesia of the lesion on the left mid thigh. A shave biopsy was performed. Hemostasis was achieved with aluminium chloride. Vaseline and a sterile dressing were applied. The patient tolerated the procedure and no complications were noted. The patient was provided with verbal and written post care instructions.     3. Seborrheic keratosis- non symptomatic     Reassured of benign nature. No further intervention required today.     4. Cherry angioma(s)    Benign nature was discussed. No further intervention required at this time.    Follow up in 1 year, earlier pending biopsy  results or for any new or changing lesions     Staff Involved:  Scribe/Staff     Scribe Disclosure:   I, Leonashellie Pedroza, am serving as a scribe to document services personally performed by Rosalie Pelletier PA-C, based on data collection and the provider's statements to me.    Provider Disclosure:   The documentation recorded by the scribe accurately reflects the services I personally performed and the decisions made by me.    All risks, benefits and alternatives were discussed with patient.  Patient is in agreement and understands the assessment and plan.  All questions were answered.  Sun Screen Education was given.   Return to Clinic annually or sooner as needed.     Again, thank you for allowing me to participate in the care of your patient.      Sincerely,    Rosalie Pelletier PA-C

## 2018-08-30 NOTE — PROGRESS NOTES
[facility] Dermatology Note      Dermatology Problem List:  0. NUB, left mid thigh- s/p bx 8/30/18  1. Hx ESKD s/p rental transplant 2004  2. BCC right lower leg. Superficial bcc left lower leg, treated with ED &C 7/22/2016  3. Skin cancer screening, 8/30/18     CC:   Chief Complaint   Patient presents with     Derm Problem     Maribel is here for a transplant skin check, has a concerning area on her left thigh.      Encounter Date: Aug 30, 2018    History of Present Illness:  Ms. Maribel Yang is a 67 year old female, with a history of kidney transplantation and NMSC, who presents today for a skin check. The patient was last seen in the dermatology clinic on 1/18/17 during which her total body skin exam was unremarkable.     Today the patient reports an area of concern on her left thigh. This spot is new and pink in color. She denies this area is painful, itchy or bothersome. She thinks it looks odd and is different than her other spots.     Otherwise the patient reports no additional painful, bleeding, nonhealing or pruritic lesions and denies any new or changing moles.    Past Medical History:   Patient Active Problem List   Diagnosis     Other specified congenital anomalies     Kidney replaced by transplant     S/P LEEP of cervix     CARDIOVASCULAR SCREENING; LDL GOAL LESS THAN 100     Immunosuppressed status (H)     Hypertension secondary to other renal disorders     History of basal cell carcinoma     YUNIOR III (vulvar intraepithelial neoplasia III)     Peripheral artery disease (H)     Squamous cell lung cancer (H)     Lumbago     Vaginal dysplasia     Alopecia     Cherry angioma     Skin cancer screening     Intertrigo     History of basal cell carcinoma     Malignant neoplasm of anal canal (H)     Aftercare following organ transplant     Past Medical History:   Diagnosis Date     Abnormal coagulation profile     p [id number]>A heterozygote      Abnormal Papanicolaou smear of cervix and  cervical HPV     Many years ago -- had colposcopies -- normal for years     Anal dysplasia      Anemia      Antiplatelet or antithrombotic long-term use      ASCUS with positive high risk HPV 2007, 2015    + HPV 56, 54,& 6, colp - TAL II, Leep =TAL II     Difficulty in walking(719.7)      High risk medication use      Hypertension      Immunosuppressed status (H)      Kidney replaced by transplant 9/04    Living donor recipient,  Rejection 7/2005     LSIL (low grade squamous intraepithelial lesion) on Pap smear 4/2013    +HPV 33 or 45, 61       Migraine, unspecified, without mention of intractable migraine without mention of status migrainosus      NONSPECIFIC MEDICAL HISTORY     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     Other acute embolism veins      Other specified viral warts 2007    Rectal warts -- HPV type 6 -- low risk     PAD (peripheral artery disease) (H)      PONV (postoperative nausea and vomiting)      Renal disease      Squamous cell lung cancer (H)      Thrombosis of leg      Unspecified disorder of kidney and ureter     X-linked dominant Alport's syndrome.     Past Surgical History:   Procedure Laterality Date     BIOPSY ANAL N/A 3/14/2018    Procedure: BIOPSY ANAL;;  Surgeon: Shabbir Leo MD;  Location: [facility] NONSPECIFIC PROCEDURE      Thrombectomy     C NONSPECIFIC PROCEDURE  1955 and 1959    Bilater eye surgery - correction for crossed eyes     C NONSPECIFIC PROCEDURE  1998    oopherectomy L     C NONSPECIFIC PROCEDURE  1967    open kidney biopsy - L     C TRANSPLANTATION OF KIDNEY  9/04    recipient -- done at [facility]     COLONOSCOPY       COLONOSCOPY N/A 8/9/2017    Procedure: COMBINED COLONOSCOPY, SINGLE OR MULTIPLE BIOPSY/POLYPECTOMY BY BIOPSY;;  Surgeon: Sushil Hyatt MD;  Location:  GI     COLPOSCOPY,LOOP ELECTRD CERVIX EXCIS  03/11/08    TAL II     CONIZATION LEEP  7/17/2013    Procedure: CONIZATION LEEP;;  Surgeon: Dexter  Liliana Murrell MD;  [facility]     CONIZATION LEEP N/A 8/17/2016    Procedure: CONIZATION LEEP;  Surgeon: Liliana Renteria MD;  [facility]     EXAM UNDER ANESTHESIA ANUS  7/15/2014    Procedure: EXAM UNDER ANESTHESIA ANUS;  Surgeon: Radha Musa MD;  [facility]     EXAM UNDER ANESTHESIA ANUS N/A 3/14/2018    Procedure: EXAM UNDER ANESTHESIA ANUS;  Anal Exam Under Anesthesia With Excision of anal lesion, proctoscopy;  Surgeon: Shabbir Leo MD;  [facility]     EYE SURGERY       LASER CO2 EXCISE VULVA WIDE LOCAL  7/15/2014    Procedure: LASER CO2 EXCISE VULVA WIDE LOCAL;  Surgeon: Liliana Renteria MD;  [facility]     LASER CO2 VAGINA  7/17/2013    Procedure: LASER CO2 VAGINA;;  Surgeon: Liliana Renteria MD;  [facility]     MICROSCOPY ANAL  7/17/2013    Procedure: MICROSCOPY ANAL;  Anal Microscopy,  EUA vagina,Colposcopy Of Vagina And Vulva, Vaginal Biopsies, Omniguide Co2 Laser To Vagina and vulva, Loop Electrosurgical Excision Procedure To Cervix;  Surgeon: Radha Musa MD;  [facility]     MICROSCOPY ANAL  7/15/2014    Procedure: MICROSCOPY ANAL;  Surgeon: Radha Musa MD;  [facility]       Social History:  The patient does not work, looking for a job. The patient denies use of tanning beds.    Medications:  Current Outpatient Prescriptions   Medication Sig Dispense Refill     ACETAMINOPHEN PO Take 1-2 tablets by mouth every 8 hours as needed for pain       acetaminophen-codeine (TYLENOL WITH CODEINE #3) 300-30 MG per tablet Take 1-2 tablets by mouth every 6 hours as needed for pain 50 tablet 0     amoxicillin (AMOXIL) 500 MG capsule Take 4 capsules (2,000 mg) by mouth once as needed Prior to dental procedures 16 capsule 3     atorvastatin (LIPITOR) 20 MG tablet Take 1 tablet (20 mg) by mouth daily 30 tablet 11     CALCIUM 500 MG OR TABS 1 tab bid (Patient taking differently: Take 1 tablet by mouth twice daily)  0      cilostazol (PLETAL) 100 MG tablet Take 1 tablet (100 mg) by mouth 2 times daily 60 tablet 11     clopidogrel (PLAVIX) 75 MG tablet Take 1 tablet (75 mg) by mouth daily 30 tablet 11     Lactobacillus-Inulin ([facility] DIGESTIVE [facility]ALTH) CAPS TAKE ONE CAPSULE BY MOUTH EVERY  capsule 0     losartan (COZAAR) 25 MG tablet TAKE ONE-HALF TABLET (12.5MG) BY MOUTH EVERY DAY 15 tablet 5     metoprolol tartrate (LOPRESSOR) 100 MG tablet Take 1 tablet (100 mg) by mouth 2 times daily 60 tablet 5     NIFEdipine ER osmotic (PROCARDIA XL) 90 MG 24 hr tablet Take 1 tablet (90 mg) by mouth daily 30 tablet 5     ORDER FOR DME Equipment being ordered: TENS 1 Device 0     predniSONE (DELTASONE) 5 MG tablet Take 1 tablet (5 mg) by mouth daily 90 tablet 3     prochlorperazine (COMPAZINE) 10 MG tablet Take 1 tablet (10 mg) by mouth every 6 hours as needed (Nausea/Vomiting) 30 tablet 2     sirolimus (GENERIC EQUIVALENT) 1 MG tablet Take 2 tablets (2 mg) by mouth daily 180 tablet 3     Allergies   Allergen Reactions     Ultracet Nausea and Vomiting and Hives     Fentanyl Nausea     Hydrocodone Nausea and Vomiting and Hives         Review of Systems:  -Skin Establ Pt: The patient denies any new rash, pruritus, or lesions that are symptomatic, changing or bleeding, except as per HPI.  -Constitutional: The patient denies fatigue, fevers, chills, unintended weight loss, and night sweats. She is feeling generally well.  -HEENT: Patient denies nonhealing oral sores.  -Skin: As above in HPI. No additional skin concerns.    Physical exam:  Vitals: There were no vitals taken for this visit.  GEN: This is a well developed, well-nourished female in no acute distress, in a pleasant mood.    SKIN: Full skin, which includes the head/face, both arms, chest, back, abdomen,both legs, groin, buttocks, digits and/or nails, was examined. Significant for:   -There are bright red some shaped papules scattered on the trunk.   -There is no erythema,  telangectasias, nodularity, or pigmentation on the right lower leg or left lower leg.  - There are scattered brown stuck on waxy papules on the trunk  - There is a 4 mm pink scaly papule to the left mid thigh   -No other lesions of concern on areas examined.     Impression/Plan:  1. Hx NMSC, superficial bcc left lower leg, treated with ED &C 7/22/2016    No signs of recurrence upon physical examination today     Recommend regular skin cancer screenings     Recommend regular use of sun protection with emphasis on reapplication     ABCD's of melanoma were reviewed with patient and handout provided.      2. Neoplasm of uncertain behavior to the left mid thigh. Differential diagnosis to include: superficial bcc vs inflamed seborrheic keratosis vs other  After discussion of benefits and risks including but not limited to bleeding, infection, scar, incomplete removal, recurrence, and non-diagnostic biopsy, written consent and photographs were obtained. The area was cleaned with isopropyl alcohol. 2.0 mL of 1% lidocaine with epinephrine was injected to obtain adequate anesthesia of the lesion on the left mid thigh. A shave biopsy was performed. Hemostasis was achieved with aluminium chloride. Vaseline and a sterile dressing were applied. The patient tolerated the procedure and no complications were noted. The patient was provided with verbal and written post care instructions.     3. Seborrheic keratosis- non symptomatic     Reassured of benign nature. No further intervention required today.     4. Cherry angioma(s)    Benign nature was discussed. No further intervention required at this time.    Follow up in 1 year, earlier pending biopsy results or for any new or changing lesions     Staff Involved:  Scribe/Staff     Scribe Disclosure:   HALIMA, Leona Pedroza, am serving as a scribe to document services personally performed by Rosalie Pelletier PA-C, based on data collection and the provider's statements to me.    Provider  Disclosure:   The documentation recorded by the scribe accurately reflects the services I personally performed and the decisions made by me.    All risks, benefits and alternatives were discussed with patient.  Patient is in agreement and understands the assessment and plan.  All questions were answered.  Sun Screen Education was given.   Return to Clinic annually or sooner as needed.   Rosalie Pelletier PA-C   [facility]

## 2018-08-30 NOTE — NURSING NOTE
Dermatology Rooming Note    Maribel Yang's goals for this visit include:   Chief Complaint   Patient presents with     Derm Problem     Maribel is here for a transplant skin check, has a concerning area on her left thigh.        Melody Blackwood LPN

## 2018-08-30 NOTE — PATIENT INSTRUCTIONS
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
## 2018-08-31 ENCOUNTER — TELEPHONE (OUTPATIENT)
Dept: TRANSPLANT | Facility: CLINIC | Age: 68
End: 2018-08-31

## 2018-08-31 NOTE — TELEPHONE ENCOUNTER
Transplant Social Work Services Phone Call      Data: patient assistance program  Intervention: Called patient to see if she had received patient assistance program that were mailed to her. Patient stated she did and will be filling out the forms and returning to this writer. Patient reported she received a bill from the pharmacy for her sirolimus. Informed patient this writer did use transplant funds to pay for her most recent sirolimus bill.   Assessment: Due to raise in sirolimus price, it appears patient may be in the Part D donut hole. Patient did not have Medicare at time of transplant so her immunosuppression medications get billed to Part D instead of B. Due to patient having insurance it is not a guarantee she will qualify for the free drug program.   Education provided by SW: Informed patient that if she does not qualify for the free drug program we can see how much she spends on her medications compared to her income and try to appeal the denial.   Plan: Patient to return completed patient assistance program application to this writer.     Rosie Pascual, [facility]    Kidney/Pancreas/[facility] [place] Transplant Programs

## 2018-09-03 ASSESSMENT — ENCOUNTER SYMPTOMS
VOMITING: 0
BOWEL INCONTINENCE: 1
ABDOMINAL PAIN: 1
POOR WOUND HEALING: 1
RECTAL PAIN: 0
HEARTBURN: 1
DIARRHEA: 1
NAIL CHANGES: 0
JAUNDICE: 0
BLOATING: 1
NAUSEA: 1
CONSTIPATION: 0
BLOOD IN STOOL: 0
SKIN CHANGES: 0

## 2018-09-04 ENCOUNTER — OFFICE VISIT (OUTPATIENT)
Dept: ONCOLOGY | Facility: CLINIC | Age: 68
End: 2018-09-04
Attending: OBSTETRICS & GYNECOLOGY
Payer: COMMERCIAL

## 2018-09-04 VITALS
DIASTOLIC BLOOD PRESSURE: 73 MMHG | OXYGEN SATURATION: 98 % | HEART RATE: 74 BPM | TEMPERATURE: 98.6 F | BODY MASS INDEX: 18.22 KG/M2 | RESPIRATION RATE: 16 BRPM | HEIGHT: 62 IN | SYSTOLIC BLOOD PRESSURE: 133 MMHG | WEIGHT: 99 LBS

## 2018-09-04 DIAGNOSIS — N90.3 VULVAR DYSPLASIA: ICD-10-CM

## 2018-09-04 DIAGNOSIS — N87.9 CERVICAL DYSPLASIA: Primary | ICD-10-CM

## 2018-09-04 DIAGNOSIS — N89.3 VAGINAL DYSPLASIA: ICD-10-CM

## 2018-09-04 LAB — COPATH REPORT: NORMAL

## 2018-09-04 PROCEDURE — 88175 CYTOPATH C/V AUTO FLUID REDO: CPT | Performed by: OBSTETRICS & GYNECOLOGY

## 2018-09-04 PROCEDURE — 57421 EXAM/BIOPSY OF VAG W/SCOPE: CPT | Mod: ZP | Performed by: OBSTETRICS & GYNECOLOGY

## 2018-09-04 PROCEDURE — 99213 OFFICE O/P EST LOW 20 MIN: CPT | Mod: 25 | Performed by: OBSTETRICS & GYNECOLOGY

## 2018-09-04 PROCEDURE — G0463 HOSPITAL OUTPT CLINIC VISIT: HCPCS | Mod: 25

## 2018-09-04 PROCEDURE — 57452 EXAM OF CERVIX W/SCOPE: CPT | Mod: ZF

## 2018-09-04 PROCEDURE — G0463 HOSPITAL OUTPT CLINIC VISIT: HCPCS | Mod: ZF

## 2018-09-04 PROCEDURE — 87624 HPV HI-RISK TYP POOLED RSLT: CPT | Performed by: OBSTETRICS & GYNECOLOGY

## 2018-09-04 PROCEDURE — 88305 TISSUE EXAM BY PATHOLOGIST: CPT | Performed by: OBSTETRICS & GYNECOLOGY

## 2018-09-04 PROCEDURE — G0476 HPV COMBO ASSAY CA SCREEN: HCPCS | Performed by: OBSTETRICS & GYNECOLOGY

## 2018-09-04 PROCEDURE — 57421 EXAM/BIOPSY OF VAG W/SCOPE: CPT | Mod: ZF

## 2018-09-04 ASSESSMENT — PAIN SCALES - GENERAL: PAINLEVEL: NO PAIN (0)

## 2018-09-04 NOTE — NURSING NOTE
Procedure discussed. Consent signed. Time out completed. Both forms placed into scanning.    Prior to the start of the procedure and with procedural staff participation, I verbally confirmed the patient s identity using two indicators, relevant allergies, that the procedure was appropriate and matched the consent or emergent situation, and that the correct equipment/implants were available. Immediately prior to starting the procedure I conducted the Time Out with the procedural staff and re-confirmed the patient s name, procedure, and site/side. (The Joint Commission universal protocol was followed.)  Yes    Sedation (Moderate or Deep): None    Post procedure pain: 0  Cynthia Cruz RN

## 2018-09-04 NOTE — NURSING NOTE
"Oncology Rooming Note    September 4, 2018 12:30 PM   Maribel Yang is a 67 year old female who presents for:    Chief Complaint   Patient presents with     Oncology Clinic Visit     Return YUNIOR 3; Colpo     Initial Vitals: /73  Pulse 74  Temp 98.6  F (37  C) (Oral)  Resp 16  Ht 1.575 m (5' 2\")  Wt 44.9 kg (99 lb)  SpO2 98%  BMI 18.11 kg/m2 Estimated body mass index is 18.11 kg/(m^2) as calculated from the following:    Height as of this encounter: 1.575 m (5' 2\").    Weight as of this encounter: 44.9 kg (99 lb). Body surface area is 1.4 meters squared.  No Pain (0) Comment: Data Unavailable   No LMP recorded. Patient is postmenopausal.  Allergies reviewed: Yes  Medications reviewed: Yes    Medications: Medication refills not needed today.  Pharmacy name entered into [employer]:    [facility]  [facility] - [place], [place] - [address] [place] [address] PHARMACY [place], [place] - [phone]    Clinical concerns: Colpo; no new concerns     6 minutes for nursing intake (face to face time)     Rosanna Petersen CMA              "

## 2018-09-04 NOTE — LETTER
9/4/2018       RE: Maribel Yang  [address]  [facility] [address]     Dear Colleague,    Thank you for referring your patient, Maribel Yang, to the [facility]. Please see a copy of my visit note below.                            Consult Notes on Referred Patient    Date: 9/4/2018     Patient presents today for evaluation.    Her history is as follows:  Brief Cancer History:   1/07: + HPV 6 Low risk   10/07: ASCUS, + HPV 56, 54 & 6. 12/07: [place]: TAL II   3/11/08: LEEP - TAL II   8/08: ASCUS, ECC atypia, +HPV 82   9/08: [place] - Atypia   5/09: NIL pap, 11/09: NIL pap, neg HPV   4/13: LSIL, + HPV 33 or 45, 61.   5/15/13: [place] - VAIN II & III,TAL II. Referred to gyn onc.   6/20/13: [place] with gyn onc. Plan LEEP and CO2 laser to vagina, cx and vaginal vault.   7/17/13: [place] in OR, Co2 laser vagina and vulva , YUNIOR 1- 2, AIN 2-3, VAIN 2-3, LEEP, ECC negative.  12/2/13: Colonoscopy negative. Pap ASC-H  5/8/14:  Pap ASCUS/AGC  5/22/14:  Pap ASCUS, Anal pap ASC-US, labial biopsy negative  7/15/14:  Pap LSIL, right & left vulvar biopsy HSIL, CO2 laser  4/2/15:  Pap ASCUS, vaginal suburethral biopsy VIN1-2  5/26/16:  Atypical glandular cells, pap    6/28/16:  EMB, ECC.  Endometrial biopsy with superficial atrophic benign endometrium.  ECC CIN3    8/17/16:  EUA, colposcopy vagina, LEEP, Vaginal biopsies.  LEEP CIN1, margins negative.  ECC with reactive change, vaginal biopsy VIN1       6/1/17: ECC + cervical biopsy negative, pap NILM + HPV    3/14/18:  Invasive anal squamous cell carcinoma, excised but close margins.    Underwent chemoradiation completed on 6/11/18.    The patient returns today for follow-up related to her cervical and vaginal dysplasia.  She has no specific gyn concerns.  Completed therapy for her anal cancer.     Review of Systems:  Answers for HPI/ROS submitted by the patient on 9/3/2018   General Symptoms: No  Skin Symptoms: Yes  HENT Symptoms: No  EYE SYMPTOMS:  No  HEART SYMPTOMS: No  LUNG SYMPTOMS: No  INTESTINAL SYMPTOMS: Yes  URINARY SYMPTOMS: No  GYNECOLOGIC SYMPTOMS: No  BREAST SYMPTOMS: No  SKELETAL SYMPTOMS: No  BLOOD SYMPTOMS: No  NERVOUS SYSTEM SYMPTOMS: No  MENTAL HEALTH SYMPTOMS: No  Changes in hair: No  Changes in moles/birth marks: No  Itching: No  Rashes: No  Changes in nails: No  Acne: No  Hair in places you don't want it: No  Change in facial hair: No  Warts: No  Non-healing sores: Yes  Scarring: No  Flaking of skin: No  Color changes of hands/feet in cold : No  Sun sensitivity: No  Skin thickening: No  Heart burn or indigestion: Yes  Nausea: Yes  Vomiting: No  Abdominal pain: Yes  Bloating: Yes  Constipation: No  Diarrhea: Yes  Blood in stool: No  Black stools: No  Rectal or Anal pain: No  Fecal incontinence: Yes  Yellowing of skin or eyes: No  Vomit with blood: No  Change in stools: No    Past Medical History:    Past Medical History:   Diagnosis Date     Abnormal coagulation profile     p [id number] heterozygote      Anemia      Antiplatelet or antithrombotic long-term use      ASCUS with positive high risk HPV 2007, 2015    + HPV 56, 54,& 6, colp - TAL III, Leep =TAL II     Basal cell carcinoma      Hypertension      Immunosuppressed status (H)     due meds     Kidney replaced by transplant 9/04    Living donor recipient,  Rejection 7/2005     LSIL (low grade squamous intraepithelial lesion) on Pap smear 4/2013    +HPV 33 or 45, 61       PAD (peripheral artery disease) (H)      PONV (postoperative nausea and vomiting)      Squamous cell lung cancer (H)      Thrombosis of leg 1967     Unspecified disorder of kidney and ureter     X-linked dominant Alport's syndrome.         Past Surgical History:    Past Surgical History:   Procedure Laterality Date     BIOPSY ANAL N/A 3/14/2018    Procedure: BIOPSY ANAL;;  Surgeon: Shabbir Leo MD;  [facility]     C NONSPECIFIC PROCEDURE      Thrombectomy     C NONSPECIFIC PROCEDURE  [facility]     C NONSPECIFIC PROCEDURE  1998    oopherectomy L     C NONSPECIFIC PROCEDURE  1967    open kidney biopsy - L     C TRANSPLANTATION OF KIDNEY  9/04    recipient -- done at [facility]     COLONOSCOPY       COLONOSCOPY N/A 8/9/2017    Procedure: COMBINED COLONOSCOPY, SINGLE OR MULTIPLE BIOPSY/POLYPECTOMY BY BIOPSY;;  Surgeon: Sushil Hyatt MD;  Location:  GI     COLPOSCOPY,LOOP ELECTRD CERVIX EXCIS  03/11/08    TAL II     CONIZATION LEEP  7/17/2013    Procedure: CONIZATION LEEP;;  Surgeon: Liliana Renteria MD;  [facility]     CONIZATION LEEP N/A 8/17/2016    Procedure: CONIZATION LEEP;  Surgeon: Liliana Renteria MD;  [facility]     EXAM UNDER ANESTHESIA ANUS  7/15/2014    Procedure: EXAM UNDER ANESTHESIA ANUS;  Surgeon: Radha Musa MD;  [facility]     EXAM UNDER ANESTHESIA ANUS N/A 3/14/2018    Procedure: EXAM UNDER ANESTHESIA ANUS;  Anal Exam Under Anesthesia With Excision of anal lesion, proctoscopy;  Surgeon: Shabbir Leo MD;  Location[facility] OR     EYE SURGERY       LASER CO2 EXCISE VULVA WIDE LOCAL  7/15/2014    Procedure: LASER CO2 EXCISE VULVA WIDE LOCAL;  Surgeon: Liliana Renteria MD;  Location:  OR     LASER CO2 VAGINA  7/17/2013    Procedure: LASER CO2 VAGINA;;  Surgeon: Liliana Renteria MD;  [facility]     LASER CO2 VAGINA N/A 9/25/2018    Procedure: LASER CO2 VAGINA;  Exam Under Anesthesia, CO2 Laser Ablation of Upper Vagina and Cervix;  Surgeon: Pati Garcia MD;  [facility]     MICROSCOPY ANAL  7/17/2013    Procedure: MICROSCOPY ANAL;  Anal Microscopy,  EUA vagina,Colposcopy Of Vagina And Vulva, Vaginal Biopsies, Omniguide Co2 Laser To Vagina and vulva, Loop Electrosurgical Excision Procedure To Cervix;  Surgeon: Radha Musa MD;  Location: [facility] OR     MICROSCOPY ANAL  7/15/2014    Procedure: MICROSCOPY ANAL;  Surgeon: Radha Musa MD;  Location:  OR         [facility]  Maintenance:  Health Maintenance Due   Topic Date Due     PHQ-2 Q1 YR  2017     ADVANCE DIRECTIVE PLANNING Q5 YRS  10/03/2017     DEXA SCAN SCREENING (SYSTEM ASSIGNED)  12/15/2017     INFLUENZA VACCINE (1) 2018     MAMMO Q1 YR  2018       Current Medications:     has a current medication list which includes the following prescription(s): acetaminophen, atorvastatin, cilostazol, clopidogrel, culturelle digestive health, losartan, metoprolol tartrate, nifedipine er osmotic, prednisone, sirolimus, acetaminophen-codeine, amoxicillin, calcium carbonate 600 mg-vitamin d 400 units, ibuprofen, and order for dme.       Allergies:     [unfilled]        Social History:     Social History   Substance Use Topics     Smoking status: Former Smoker     Packs/day: 0.30     Years: 35.00     Types: Cigarettes     Quit date: 2014     Smokeless tobacco: Never Used      Comment: occ cig     Alcohol use 0.0 oz/week     0 Standard drinks or equivalent per week      Comment: rare       History   Drug Use No           Family History:     The patient's family history is notable for:    Family History   Problem Relation Age of Onset     Diabetes Father      type 2 diag age,60's     Alcohol/Drug Father      Arthritis Father      Hypertension Father      Lipids Father      high cholesterol     Arthritis Mother      Diabetes Mother      Depression Mother      HEART DISEASE Mother      Neurologic Disorder Mother      Obesity Mother      Psychotic Disorder Mother      Thyroid Disease Mother      Gynecology Sister      Precancerous cell removal from cervix at age 45     Depression Sister      Allergies Sister      Alcohol/Drug Sister      Neurologic Disorder Sister      Cerebrovascular Disease Paternal Grandmother       of a stroke in her 80's     Diabetes Paternal Grandmother      Alcohol/Drug Son      Colon Polyps Sister      Colon Cancer No family hx of      Crohn Disease No family hx of      Ulcerative Colitis No  "family hx of      Melanoma No family hx of      Skin Cancer No family hx of          Physical Exam:     /73  Pulse 74  Temp 98.6  F (37  C) (Oral)  Resp 16  Ht 1.575 m (5' 2\")  Wt 44.9 kg (99 lb)  SpO2 98%  BMI 18.11 kg/m2  Body mass index is 18.11 kg/(m^2).    General Appearance: healthy and alert, no distress     Musculoskeletal: extremities non tender and without edema    Skin: no lesions or rashes     Neurological: normal gait, no gross defects     Psychiatric: appropriate mood and affect                               Hematological: normal cervical, supraclavicular and inguinal lymph nodes     Gastrointestinal:       abdomen soft, non-tender, non-distended, no organomegaly or masses    Colposcopy Note:    Written and verbal informed consent obtained.    Prior to the start of the procedure and with procedural staff participation, I verbally confirmed the patient s identity using two indicators, relevant allergies, that the procedure was appropriate and matched the consent or emergent situation, and that the correct equipment/implants were available. Immediately prior to starting the procedure I conducted the Time Out with the procedural staff and re-confirmed the patient s name, procedure, and site/side. (The Joint Commission universal protocol was followed.)  Yes    Sedation (Moderate or Deep): None    Genitourinary: External genitalia and urethral meatus appears normal, BUS negative, no lesions. Vagina is smooth without nodularity or masses.  Vaginal apex and cervix scarred from multiple procedures.    After placement of speculum and full visualization of cervix, colposcopy of cervix and entire vagina performed.  Entire cervix and upper vagina visualized, no gross lesions. Pap smear obtained.  Cervix clean with saline and green filter applied with no abnormal vascularity noted.  5% acetic acid solution applied to cervix and visualized under colposcopic enhancement.  Small os, TMZ not seen. Diffuse " acetowhite enhancement upper vagina and cervix with thicker, dense changes at left vaginal apex.  Biopsy taken.  Hemostassis with silver nitrate.      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         Plan:     1.)    Long standing history of cervical and vulvar dysplasia:  Pap and biopsies done today.  She will be contacted with results and recs for follow-up.       2.) Anal cancer:  Status post surgery, chemoXRT. Follow-up with Medical Oncology and CR.      3.) Labs and/or tests ordered include:  Pap, biopsy    Pati Garcia MD  Gynecologic Oncology  [facility]    CC  Patient Care Team:  Lisa Garcia DO as PCP - General (Family Practice)  Hitesh See MD as MD (Nephrology)  Nyasia Gaines MD as Referring Physician (OB/Gyn)  Joel Davis MD as MD (Family Practice)  Rosalie Pelletier PA-C as Physician Assistant (Physician Assistant)  Liliana Renteria MD as MD (Oncology)  Meggan Tucker MD as MD (Hematology & Oncology)  Kianna Dyer, AVERY as Nurse Coordinator (Hematology & Oncology)  LISA GARCIA      Again, thank you for allowing me to participate in the care of your patient.      Sincerely,     GYN ONC Colposcopy Proivder
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
## 2018-09-04 NOTE — PATIENT INSTRUCTIONS
Pap, vaginal biopsy done today.  You will be contacted with results.    Follow-up with Dr. Welch as scheduled.    Pati Garcia MD  Gynecologic Oncology  [facility]

## 2018-09-05 ENCOUNTER — MYC MEDICAL ADVICE (OUTPATIENT)
Dept: FAMILY MEDICINE | Facility: CLINIC | Age: 68
End: 2018-09-05

## 2018-09-05 ENCOUNTER — TELEPHONE (OUTPATIENT)
Dept: DERMATOLOGY | Facility: CLINIC | Age: 68
End: 2018-09-05

## 2018-09-05 LAB — COPATH REPORT: NORMAL

## 2018-09-05 NOTE — TELEPHONE ENCOUNTER
[facility] Call Center    Phone Message    May a detailed message be left on voicemail: yes    Reason for Call: Other: PT is returning a call to Carmen in dermatology.  Please follow up with the PT.      Action Taken: Message routed to:  [facility] derm

## 2018-09-07 ENCOUNTER — TELEPHONE (OUTPATIENT)
Dept: TRANSPLANT | Facility: CLINIC | Age: 68
End: 2018-09-07

## 2018-09-07 LAB
COPATH REPORT: ABNORMAL
PAP: ABNORMAL

## 2018-09-07 NOTE — TELEPHONE ENCOUNTER
Call placed to pt to follow-up on status with sirolimus coverage.   She states that she filled out the assistance application, but has heard nothing back yet. To follow-up with her again next week.

## 2018-09-07 NOTE — TELEPHONE ENCOUNTER
Transplant Social Work Services Phone Call      Data: Rap[employer] Patient Assistance Application  Intervention: Received patient's [employer] Patient Assistance Application in the mail. Faxed completed application to [employer].  Assessment: Due to high copay for Rapamune, patient would benefit from patient assistance program. Barrier to being accepted is patient does have insurance coverage through Part D.  Education provided by SW: n/a  Plan: Wait approval/denial from [employer] which can take up to 2 weeks.     Rosie Pascual [facility]    Kidney/Pancreas/Auto Islet Transplant Programs

## 2018-09-10 LAB
FINAL DIAGNOSIS: NORMAL
HPV HR 12 DNA CVX QL NAA+PROBE: NEGATIVE
HPV16 DNA SPEC QL NAA+PROBE: NEGATIVE
HPV18 DNA SPEC QL NAA+PROBE: NEGATIVE
SPECIMEN DESCRIPTION: NORMAL
SPECIMEN SOURCE CVX/VAG CYTO: NORMAL

## 2018-09-11 ENCOUNTER — OFFICE VISIT (OUTPATIENT)
Dept: SURGERY | Facility: CLINIC | Age: 68
End: 2018-09-11
Payer: COMMERCIAL

## 2018-09-11 VITALS
WEIGHT: 101.5 LBS | HEART RATE: 78 BPM | TEMPERATURE: 98.7 F | OXYGEN SATURATION: 96 % | DIASTOLIC BLOOD PRESSURE: 58 MMHG | BODY MASS INDEX: 18.68 KG/M2 | SYSTOLIC BLOOD PRESSURE: 134 MMHG | HEIGHT: 62 IN

## 2018-09-11 DIAGNOSIS — C21.1 MALIGNANT NEOPLASM OF ANAL CANAL (H): Primary | ICD-10-CM

## 2018-09-11 ASSESSMENT — PAIN SCALES - GENERAL: PAINLEVEL: NO PAIN (0)
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
## 2018-09-11 NOTE — PROGRESS NOTES
"Colon and Rectal Surgery Clinic Note    RE: Maribel Yang  : 1950  JESSEE: 2018    Maribel is a 67 year old female who presents today for follow up of her anal cancer.    HPI: Maribel has a past medical history of kidney transplant in , lung cancer in , cervical dysplasia, ananorectal condyloma and vulvar intraepithelial neoplasia 2. She underwent High Resolution Anoscopy with biopsy showing superficially invasive squamous cell carcinoma. 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 MRI without pelvic or inguinal lymphadenopathy. She completed chemoradiation on 18 and presents today or follow up.  CT CAP and MRI Pelvis on 8/10/2018 without any evidence of recurrence.    Interval history: Maribel had difficulty with radiation but has been doing well since this was completed. She has some intermittent diarrhea. She is using a probiotic and occasional imodium but gets constipated when she takes imodium.    Physical examination:  Examination was chaperoned by Vianney Worthy NP.     Vitals: /58  Pulse 78  Temp 98.7  F (37.1  C) (Oral)  Ht 5' 2\"  Wt 101 lb 8 oz  SpO2 96%  BMI 18.56 kg/m2  BMI= Body mass index is 18.56 kg/(m^2).    Alert, oriented, in no acute distress, sitting comfortably. Perianal skin intact with small skin tags present. No visible or palpable lesions. ITZEL with some palpable scarring in the left anterior position. Anoscopy with small telangectasia and scarring present but no lesions.    Laboratory data:    Recent Labs   Lab Test  08/10/18   1414   14   1603   WBC  6.2   < >  8.7   HGB  13.3   < >  14.4   PLT  222   < >  258   CR  1.24*   < >  1.60*   ALBUMIN  3.2*   < >  4.7   BILITOTAL  0.3   < >  0.4   ALKPHOS  102   < >  153*   ALT  18 "   < >  21   AST  14   < >  24   INR   --    --   0.94    < > = values in this interval not displayed.       Assessment/plan:  No evidence of recurrence on exam today. Repeat anoscopy in 4 months. Continue to follow with oncology with Dr. Tucker with planned repeat CT in 6 months. Recommended starting a daily fiber supplement for intermittent diarrhea. Encouraged the patient to contact the clinic in the meantime with any questions or concerns. Patient's questions were answered to her stated satisfaction and she is in agreement with this plan.    Total face to face time was 10 minutes, >50% counseling.    For details of past medical history, surgical history, family history, medications, allergies, and review of systems, please see details below.    Medical history:  Past Medical History:   Diagnosis Date     Abnormal coagulation profile     p [id number] heterozygote      Abnormal Papanicolaou smear of cervix and cervical HPV     Many years ago -- had colposcopies -- normal for years     Anal dysplasia      Anemia      Antiplatelet or antithrombotic long-term use      ASCUS with positive high risk HPV 2007, 2015    + HPV 56, 54,& 6, colp - TAL II, Leep =TAL II     Basal cell carcinoma      Difficulty in walking(719.7)      High risk medication use      Hypertension      Immunosuppressed status (H)      Kidney replaced by transplant 9/04    Living donor recipient,  Rejection 7/2005     LSIL (low grade squamous intraepithelial lesion) on Pap smear 4/2013    +HPV 33 or 45, 61       Migraine, unspecified, without mention of intractable migraine without mention of status migrainosus      NONSPECIFIC MEDICAL HISTORY     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     Other acute embolism veins      Other specified viral warts 2007    Rectal warts -- HPV type 6 -- low risk     PAD (peripheral artery disease) (H)      PONV (postoperative nausea and vomiting)      Renal disease       Squamous cell lung cancer (H)      Thrombosis of leg      Unspecified disorder of kidney and ureter     X-linked dominant Alport's syndrome.       Surgical history:  Past Surgical History:   Procedure Laterality Date     BIOPSY ANAL N/A 3/14/2018    Procedure: BIOPSY ANAL;;  Surgeon: Shabbir Leo MD;  Location: [facility] OR     C NONSPECIFIC PROCEDURE      Thrombectomy     C NONSPECIFIC PROCEDURE  1955 and 1959    Bilater eye surgery - correction for crossed eyes     C NONSPECIFIC PROCEDURE  1998    oopherectomy L     C NONSPECIFIC PROCEDURE  1967    open kidney biopsy - L     C TRANSPLANTATION OF KIDNEY  9/04    recipient -- done at [facility]     COLONOSCOPY       COLONOSCOPY N/A 8/9/2017    Procedure: COMBINED COLONOSCOPY, SINGLE OR MULTIPLE BIOPSY/POLYPECTOMY BY BIOPSY;;  Surgeon: Sushil Hyatt MD;  Location:  GI     COLPOSCOPY,LOOP ELECTRD CERVIX EXCIS  03/11/08    TAL II     CONIZATION LEEP  7/17/2013    Procedure: CONIZATION LEEP;;  Surgeon: Liliana Renteria MD;  Location: [facility] OR     CONIZATION LEEP N/A 8/17/2016    Procedure: CONIZATION LEEP;  Surgeon: Liliana Renteria MD;  Location: [facility] OR     EXAM UNDER ANESTHESIA ANUS  7/15/2014    Procedure: EXAM UNDER ANESTHESIA ANUS;  Surgeon: Radha Musa MD;  Location: [facility] OR     EXAM UNDER ANESTHESIA ANUS N/A 3/14/2018    Procedure: EXAM UNDER ANESTHESIA ANUS;  Anal Exam Under Anesthesia With Excision of anal lesion, proctoscopy;  Surgeon: Shabbir Leo MD;  Location: [facility] OR     EYE SURGERY       LASER CO2 EXCISE VULVA WIDE LOCAL  7/15/2014    Procedure: LASER CO2 EXCISE VULVA WIDE LOCAL;  Surgeon: Liliana Renteria MD;  Location: [facility] OR     LASER CO2 VAGINA  7/17/2013    Procedure: LASER CO2 VAGINA;;  Surgeon: Liliana Renteria MD;  Location: [facility] OR     MICROSCOPY ANAL  7/17/2013    Procedure: MICROSCOPY ANAL;  Anal Microscopy,  EUA vagina,Colposcopy Of Vagina And Vulva, Vaginal Biopsies, Omniguide Co2 Laser To Vagina and vulva, Loop  Electrosurgical Excision Procedure To Cervix;  Surgeon: Radha Musa MD;  Location: [facility] OR     MICROSCOPY ANAL  7/15/2014    Procedure: MICROSCOPY ANAL;  Surgeon: Radha Musa MD;  Location: [facility] OR       Family history:  Family History   Problem Relation Age of Onset     Diabetes Father      type 2 diag age,60's     Alcohol/Drug Father      Arthritis Father      Hypertension Father      Lipids Father      high cholesterol     Arthritis Mother      Diabetes Mother      Depression Mother      HEART DISEASE Mother      Neurologic Disorder Mother      Obesity Mother      Psychotic Disorder Mother      Thyroid Disease Mother      Gynecology Sister      Precancerous cell removal from cervix at age 45     Depression Sister      Allergies Sister      Alcohol/Drug Sister      Neurologic Disorder Sister      Cerebrovascular Disease Paternal Grandmother       of a stroke in her 80's     Diabetes Paternal Grandmother      Alcohol/Drug Son      Colon Polyps Sister      Colon Cancer No family hx of      Crohn Disease No family hx of      Ulcerative Colitis No family hx of      Melanoma No family hx of      Skin Cancer No family hx of        Medications:  Current Outpatient Prescriptions   Medication Sig Dispense Refill     ACETAMINOPHEN PO Take 1-2 tablets by mouth every 8 hours as needed for pain       acetaminophen-codeine (TYLENOL WITH CODEINE #3) 300-30 MG per tablet Take 1-2 tablets by mouth every 6 hours as needed for pain (Patient not taking: Reported on 2018) 50 tablet 0     amoxicillin (AMOXIL) 500 MG capsule Take 4 capsules (2,000 mg) by mouth once as needed Prior to dental procedures (Patient not taking: Reported on 2018) 16 capsule 3     atorvastatin (LIPITOR) 20 MG tablet Take 1 tablet (20 mg) by mouth daily 30 tablet 11     CALCIUM 500 MG OR TABS 1 tab bid (Patient taking differently: Take 1 tablet by mouth twice daily)  0     cilostazol (PLETAL) 100 MG tablet Take 1 tablet (100  mg) by mouth 2 times daily 60 tablet 11     clopidogrel (PLAVIX) 75 MG tablet Take 1 tablet (75 mg) by mouth daily 30 tablet 11     Lactobacillus-Inulin ([facility] DIGESTIVE HEALTH) CAPS TAKE ONE CAPSULE BY MOUTH EVERY  capsule 0     losartan (COZAAR) 25 MG tablet TAKE ONE-HALF TABLET (12.5MG) BY MOUTH EVERY DAY 15 tablet 5     metoprolol tartrate (LOPRESSOR) 100 MG tablet Take 1 tablet (100 mg) by mouth 2 times daily 60 tablet 5     NIFEdipine ER osmotic (PROCARDIA XL) 90 MG 24 hr tablet Take 1 tablet (90 mg) by mouth daily 30 tablet 5     ORDER FOR DME Equipment being ordered: TENS 1 Device 0     predniSONE (DELTASONE) 5 MG tablet Take 1 tablet (5 mg) by mouth daily 90 tablet 3     sirolimus (GENERIC EQUIVALENT) 1 MG tablet Take 2 tablets (2 mg) by mouth daily 180 tablet 3       Allergies:  The patientis allergic to ultracet; fentanyl; and hydrocodone.    Social history:  Social History   Substance Use Topics     Smoking status: Former Smoker     Packs/day: 0.30     Years: 35.00     Types: Cigarettes     Quit date: 1/9/2014     Smokeless tobacco: Never Used      Comment: occ cig     Alcohol use 0.0 oz/week     0 Standard drinks or equivalent per week      Comment: rare     Marital status: .    Review of Systems:  There are no exam notes on file for this visit.         Shabbir Leo MD   Professor and Chief  Division of Colon and Rectal Surgery  [facility]      Referring Provider:  Lisa Martinez DO  [address]  [place] [address]     Primary Care Provider:  Lisa Martinez

## 2018-09-11 NOTE — LETTER
"2018       RE: Maribel Yang  [address]  [facility] [address]     Dear Colleague,    Thank you for referring your patient, Maribel Yang, to the [facility] COLON AND RECTAL SURGERY at [facility]. Please see a copy of my visit note below.    Colon and Rectal Surgery Clinic Note    RE: Maribel Yang  : 1950  JESSEE: 2018    Maribel is a 67 year old female who presents today for follow up of her anal cancer.    HPI: Maribel has a past medical history of kidney transplant in , lung cancer in , cervical dysplasia, ananorectal condyloma and vulvar intraepithelial neoplasia 2. She underwent High Resolution Anoscopy with biopsy showing superficially invasive squamous cell carcinoma. 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 MRI without pelvic or inguinal lymphadenopathy. She completed chemoradiation on 18 and presents today or follow up.  CT CAP and MRI Pelvis on 8/10/2018 without any evidence of recurrence.    Interval history: Maribel had difficulty with radiation but has been doing well since this was completed. She has some intermittent diarrhea. She is using a probiotic and occasional imodium but gets constipated when she takes imodium.    Physical examination:  Examination was chaperoned by Vianney Worthy NP.     Vitals: /58  Pulse 78  Temp 98.7  F (37.1  C) (Oral)  Ht 5' 2\"  Wt 101 lb 8 oz  SpO2 96%  BMI 18.56 kg/m2  BMI= Body mass index is 18.56 kg/(m^2).    Alert, oriented, in no acute distress, sitting comfortably. Perianal skin intact with small skin tags present. No visible or palpable lesions. ITZEL with some palpable scarring in the left anterior position. Anoscopy with small " telangectasia and scarring present but no lesions.    Laboratory data:    Recent Labs   Lab Test  08/10/18   1414   07/22/14   1603   WBC  6.2   < >  8.7   HGB  13.3   < >  14.4   PLT  222   < >  258   CR  1.24*   < >  1.60*   ALBUMIN  3.2*   < >  4.7   BILITOTAL  0.3   < >  0.4   ALKPHOS  102   < >  153*   ALT  18   < >  21   AST  14   < >  24   INR   --    --   0.94    < > = values in this interval not displayed.       Assessment/plan:  No evidence of recurrence on exam today. Repeat anoscopy in 4 months. Continue to follow with oncology with Dr. Tucker with planned repeat CT in 6 months. Recommended starting a daily fiber supplement for intermittent diarrhea. Encouraged the patient to contact the clinic in the meantime with any questions or concerns. Patient's questions were answered to her stated satisfaction and she is in agreement with this plan.    Total face to face time was 10 minutes, >50% counseling.    For details of past medical history, surgical history, family history, medications, allergies, and review of systems, please see details below.    Medical history:  Past Medical History:   Diagnosis Date     Abnormal coagulation profile     p [id number] heterozygote      Abnormal Papanicolaou smear of cervix and cervical HPV     Many years ago -- had colposcopies -- normal for years     Anal dysplasia      Anemia      Antiplatelet or antithrombotic long-term use      ASCUS with positive high risk HPV 2007, 2015    + HPV 56, 54,& 6, colp - TAL II, Leep =TAL II     Basal cell carcinoma      Difficulty in walking(719.7)      High risk medication use      Hypertension      Immunosuppressed status (H)      Kidney replaced by transplant 9/04    Living donor recipient,  Rejection 7/2005     LSIL (low grade squamous intraepithelial lesion) on Pap smear 4/2013    +HPV 33 or 45, 61       Migraine, unspecified, without mention of intractable migraine without mention of status migrainosus      NONSPECIFIC MEDICAL HISTORY      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     Other acute embolism veins      Other specified viral warts 2007    Rectal warts -- HPV type 6 -- low risk     PAD (peripheral artery disease) (H)      PONV (postoperative nausea and vomiting)      Renal disease      Squamous cell lung cancer (H)      Thrombosis of leg      Unspecified disorder of kidney and ureter     X-linked dominant Alport's syndrome.       Surgical history:  Past Surgical History:   Procedure Laterality Date     BIOPSY ANAL N/A 3/14/2018    Procedure: BIOPSY ANAL;;  Surgeon: Shabbir Leo MD;  Location: [facility] OR     C NONSPECIFIC PROCEDURE      Thrombectomy     C NONSPECIFIC PROCEDURE  1955 and 1959    Bilater eye surgery - correction for crossed eyes     C NONSPECIFIC PROCEDURE  1998    oopherectomy L     C NONSPECIFIC PROCEDURE  1967    open kidney biopsy - L     C TRANSPLANTATION OF KIDNEY  9/04    recipient -- done at [facility]     COLONOSCOPY       COLONOSCOPY N/A 8/9/2017    Procedure: COMBINED COLONOSCOPY, SINGLE OR MULTIPLE BIOPSY/POLYPECTOMY BY BIOPSY;;  Surgeon: Sushil Hyatt MD;  Location:  GI     COLPOSCOPY,LOOP ELECTRD CERVIX EXCIS  03/11/08    TAL II     CONIZATION LEEP  7/17/2013    Procedure: CONIZATION LEEP;;  Surgeon: Liliana Renteria MD;  Location:  OR     CONIZATION LEEP N/A 8/17/2016    Procedure: CONIZATION LEEP;  Surgeon: Liliana Renteria MD;  Location:  OR     EXAM UNDER ANESTHESIA ANUS  7/15/2014    Procedure: EXAM UNDER ANESTHESIA ANUS;  Surgeon: Radha Musa MD;  Location:  OR     EXAM UNDER ANESTHESIA ANUS N/A 3/14/2018    Procedure: EXAM UNDER ANESTHESIA ANUS;  Anal Exam Under Anesthesia With Excision of anal lesion, proctoscopy;  Surgeon: Shabbir Leo MD;  Location:  OR     EYE SURGERY       LASER CO2 EXCISE VULVA WIDE LOCAL  7/15/2014    Procedure: LASER CO2 EXCISE VULVA WIDE LOCAL;  Surgeon: Liliana Renteria MD;  Location:   OR     LASER CO2 VAGINA  2013    Procedure: LASER CO2 VAGINA;;  Surgeon: Liliana Renteria MD;  Location: [facility] OR     MICROSCOPY ANAL  2013    Procedure: MICROSCOPY ANAL;  Anal Microscopy,  EUA vagina,Colposcopy Of Vagina And Vulva, Vaginal Biopsies, Omniguide Co2 Laser To Vagina and vulva, Loop Electrosurgical Excision Procedure To Cervix;  Surgeon: Radha Musa MD;  Location: [facility] OR     MICROSCOPY ANAL  7/15/2014    Procedure: MICROSCOPY ANAL;  Surgeon: Radha Musa MD;  Location: [facility] OR       Family history:  Family History   Problem Relation Age of Onset     Diabetes Father      type 2 diag age,60's     Alcohol/Drug Father      Arthritis Father      Hypertension Father      Lipids Father      high cholesterol     Arthritis Mother      Diabetes Mother      Depression Mother      HEART DISEASE Mother      Neurologic Disorder Mother      Obesity Mother      Psychotic Disorder Mother      Thyroid Disease Mother      Gynecology Sister      Precancerous cell removal from cervix at age 45     Depression Sister      Allergies Sister      Alcohol/Drug Sister      Neurologic Disorder Sister      Cerebrovascular Disease Paternal Grandmother       of a stroke in her 80's     Diabetes Paternal Grandmother      Alcohol/Drug Son      Colon Polyps Sister      Colon Cancer No family hx of      Crohn Disease No family hx of      Ulcerative Colitis No family hx of      Melanoma No family hx of      Skin Cancer No family hx of        Medications:  Current Outpatient Prescriptions   Medication Sig Dispense Refill     ACETAMINOPHEN PO Take 1-2 tablets by mouth every 8 hours as needed for pain       acetaminophen-codeine (TYLENOL WITH CODEINE #3) 300-30 MG per tablet Take 1-2 tablets by mouth every 6 hours as needed for pain (Patient not taking: Reported on 2018) 50 tablet 0     amoxicillin (AMOXIL) 500 MG capsule Take 4 capsules (2,000 mg) by mouth once as needed Prior to dental procedures  (Patient not taking: Reported on 9/4/2018) 16 capsule 3     atorvastatin (LIPITOR) 20 MG tablet Take 1 tablet (20 mg) by mouth daily 30 tablet 11     CALCIUM 500 MG OR TABS 1 tab bid (Patient taking differently: Take 1 tablet by mouth twice daily)  0     cilostazol (PLETAL) 100 MG tablet Take 1 tablet (100 mg) by mouth 2 times daily 60 tablet 11     clopidogrel (PLAVIX) 75 MG tablet Take 1 tablet (75 mg) by mouth daily 30 tablet 11     Lactobacillus-Inulin ([facility] DIGESTIVE [facility]) CAPS TAKE ONE CAPSULE BY MOUTH EVERY  capsule 0     losartan (COZAAR) 25 MG tablet TAKE ONE-HALF TABLET (12.5MG) BY MOUTH EVERY DAY 15 tablet 5     metoprolol tartrate (LOPRESSOR) 100 MG tablet Take 1 tablet (100 mg) by mouth 2 times daily 60 tablet 5     NIFEdipine ER osmotic (PROCARDIA XL) 90 MG 24 hr tablet Take 1 tablet (90 mg) by mouth daily 30 tablet 5     ORDER FOR DME Equipment being ordered: TENS 1 Device 0     predniSONE (DELTASONE) 5 MG tablet Take 1 tablet (5 mg) by mouth daily 90 tablet 3     sirolimus (GENERIC EQUIVALENT) 1 MG tablet Take 2 tablets (2 mg) by mouth daily 180 tablet 3       Allergies:  The patientis allergic to ultracet; fentanyl; and hydrocodone.    Social history:  Social History   Substance Use Topics     Smoking status: Former Smoker     Packs/day: 0.30     Years: 35.00     Types: Cigarettes     Quit date: 1/9/2014     Smokeless tobacco: Never Used      Comment: occ cig     Alcohol use 0.0 oz/week     0 Standard drinks or equivalent per week      Comment: rare     Marital status: .    Review of Systems:  There are no exam notes on file for this visit.   Referring Provider:  Lisa Martinez DO  [address] [address]     Primary Care Provider:  Lisa Martinez    Again, thank you for allowing me to participate in the care of your patient.      Sincerely,    Shabbir Leo MD

## 2018-09-12 ENCOUNTER — TELEPHONE (OUTPATIENT)
Dept: ONCOLOGY | Facility: CLINIC | Age: 68
End: 2018-09-12

## 2018-09-12 ENCOUNTER — OFFICE VISIT (OUTPATIENT)
Dept: DERMATOLOGY | Facility: CLINIC | Age: 68
End: 2018-09-12
Payer: COMMERCIAL

## 2018-09-12 DIAGNOSIS — N87.9 CERVICAL DYSPLASIA: Primary | ICD-10-CM

## 2018-09-12 DIAGNOSIS — D04.72: Primary | ICD-10-CM

## 2018-09-12 DIAGNOSIS — D07.1 VIN III (VULVAR INTRAEPITHELIAL NEOPLASIA III): Primary | ICD-10-CM

## 2018-09-12 RX ORDER — CEFAZOLIN SODIUM 1 G/50ML
1 INJECTION, SOLUTION INTRAVENOUS SEE ADMIN INSTRUCTIONS
Status: CANCELLED | OUTPATIENT
Start: 2018-09-12 | End: 2019-09-12

## 2018-09-12 ASSESSMENT — PAIN SCALES - GENERAL
PAINLEVEL: NO PAIN (0)
PAINLEVEL: NO PAIN (0)

## 2018-09-12 NOTE — PROGRESS NOTES
Procedure Note: Electrodesiccation and Curettage    PREOPERATIVE DIAGNOSIS: Squamous cell carcinoma in situ     LOCATION: Left mid thigh     SIZE:  0.6 x 0.7 cm     The risks and benefits of the procedure were described to the patient.  These include but are not limited to bleeding, infection, scar, incomplete removal, and recurrence. Written informed consent was obtained. The above site was cleansed with an alcohol pad and injected with 1.4 ml of 1% lidocaine. Once anesthesia was obtained, the site was prepped with Hibiclens and rinsed with sterile saline.  The lesion was curetted with  in 3 directions and this was followed by electrodessication.  This process was repeated three times. The defect measured  1 x 1 cm. Vaseline and a bandage were applied to the wound. The patient tolerated the procedure well and was given post care instructions.    Clinical Follow-up: 3 months     Staff Involved   Scribe/Staff    Scribe Disclosure:   Leona VARGHESE, am serving as a scribe to document services personally performed by Rosalie Pelletier PA-C, based on data collection and the provider's statements to me.    Provider Disclosure:   The documentation recorded by the scribe accurately reflects the services I personally performed and the decisions made by me.    All risks, benefits and alternatives were discussed with patient.  Patient is in agreement and understands the assessment and plan.  All questions were answered.  Sun Screen Education was given.   Return to Clinic in 3 months or sooner as needed.   Rosalie Pelletier PA-C   [facility]

## 2018-09-12 NOTE — TELEPHONE ENCOUNTER
Patient is scheduled for surgery with Dr. Garcia      Spoke or left message with: Left message about procedure on 09/12/18    Date of Surgery: 09/25/18 @ 8:00 a.m.    Location: [facility]    Informed patient they will need an adult      Pre-op with surgeon (if applicable):     H&P: Scheduled with     Additional imaging/appointments:     Surgery packet:   Additional comments:

## 2018-09-12 NOTE — NURSING NOTE
Dermatology Rooming Note    Maribel Yang's goals for this visit include:   Chief Complaint   Patient presents with     Derm Problem     ED&C on left mid thigh, SCC.     Bia Lennon, CMA

## 2018-09-12 NOTE — NURSING NOTE
Lidocaine  3mL once for one use, starting 9/12/2018 ending 9/12/2018,  2mL disp, R-0, injection  Injected by Bia Lennon, CMA
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
## 2018-09-12 NOTE — LETTER
9/12/2018       RE: Maribel Yang  [address]  [facility] [address]     Dear Colleague,    Thank you for referring your patient, Maribel Yang, to the [facility] DERMATOLOGY at [facility]. Please see a copy of my visit note below.    Procedure Note: Electrodesiccation and Curettage    PREOPERATIVE DIAGNOSIS: Squamous cell carcinoma in situ     LOCATION: Left mid thigh     SIZE:  0.6 x 0.7 cm     The risks and benefits of the procedure were described to the patient.  These include but are not limited to bleeding, infection, scar, incomplete removal, and recurrence. Written informed consent was obtained. The above site was cleansed with an alcohol pad and injected with 1.4 ml of 1% lidocaine. Once anesthesia was obtained, the site was prepped with Hibiclens and rinsed with sterile saline.  The lesion was curetted with  in 3 directions and this was followed by electrodessication.  This process was repeated three times. The defect measured  1 x 1 cm. Vaseline and a bandage were applied to the wound. The patient tolerated the procedure well and was given post care instructions.    Clinical Follow-up: 3 months     Staff Involved   Scribe/Staff    Scribe Disclosure:   I, Leona Pedroza, am serving as a scribe to document services personally performed by Rosalie Pelletier PA-C, based on data collection and the provider's statements to me.    Provider Disclosure:   The documentation recorded by the scribe accurately reflects the services I personally performed and the decisions made by me.    All risks, benefits and alternatives were discussed with patient.  Patient is in agreement and understands the assessment and plan.  All questions were answered.  Sun Screen Education was given.   Return to Clinic in 3 months or sooner as needed.   Rosalie Pelletier PA-C   [facility]

## 2018-09-12 NOTE — PATIENT INSTRUCTIONS
Wound Care:  Electrodesiccation and Curettage     I will experience scar, altered skin color, bleeding, swelling, pain, crusting and redness. I may experience altered sensation. Risks are excessive bleeding, infection, muscle weakness, thick (hypertrophic or keloidal) scar, and recurrence,. A second procedure may be recommended to obtain the best cosmetic or functional result.    What is electrodesiccation and curettage ?    Scraping off tissue (curettage)    Destroy tissue using electric current or cautery (electrodessication)    How do I perform wound care?    Keep dressing in place for two days. You may shower with the dressing in place(do not get wet)    After 2 days, wash hands and remove dressing. Clean wound with cotton-swab soaked in hydrogen peroxide to remove drainage and crust    Put on a thick layer of Vaseline on the wound using a cotton-swab     Cover the wound with a Band-AidTM to protect from dust and tight clothing    If wound is draining before two days, change your dressing as described above sooner    During wound care, do not allow the area to dry out or form a scab    What do I need?    Hydrogen peroxide     Cotton-swabs     Vaseline or petroleum jelly     Band-AidsTM or dressing supplies as needed     When should I call the doctor?  [facility]: [phone]  [facility]: [phone]  For urgent needs outside of business hours call the [facility] at [phone] and ask for the dermatology resident on call

## 2018-09-17 NOTE — TELEPHONE ENCOUNTER
Call placed to pt to follow-up on status with sirolimus coverage. No answer. Left vm asking her for a return call.

## 2018-09-19 ENCOUNTER — APPOINTMENT (OUTPATIENT)
Dept: SURGERY | Facility: CLINIC | Age: 68
End: 2018-09-19
Payer: COMMERCIAL

## 2018-09-19 ENCOUNTER — ANESTHESIA EVENT (OUTPATIENT)
Dept: SURGERY | Facility: CLINIC | Age: 68
End: 2018-09-19
Payer: COMMERCIAL

## 2018-09-19 ENCOUNTER — OFFICE VISIT (OUTPATIENT)
Dept: SURGERY | Facility: CLINIC | Age: 68
End: 2018-09-19
Payer: COMMERCIAL

## 2018-09-19 VITALS
RESPIRATION RATE: 16 BRPM | HEART RATE: 66 BPM | WEIGHT: 97.8 LBS | DIASTOLIC BLOOD PRESSURE: 77 MMHG | OXYGEN SATURATION: 98 % | TEMPERATURE: 97.7 F | HEIGHT: 62 IN | BODY MASS INDEX: 18 KG/M2 | SYSTOLIC BLOOD PRESSURE: 147 MMHG

## 2018-09-19 DIAGNOSIS — Z01.818 PREOP EXAMINATION: Primary | ICD-10-CM

## 2018-09-19 DIAGNOSIS — D07.1 VIN III (VULVAR INTRAEPITHELIAL NEOPLASIA III): ICD-10-CM

## 2018-09-19 DIAGNOSIS — Z01.818 PREOP GENERAL PHYSICAL EXAM: Primary | ICD-10-CM

## 2018-09-19 ASSESSMENT — LIFESTYLE VARIABLES: TOBACCO_USE: 1

## 2018-09-19 NOTE — PROGRESS NOTES
Preoperative Assessment Center medication history for September 19, 2018 is complete.    See Epic admission navigator for prior to admission medications.   Operating room staff will still need to confirm medications and last dose information on day of surgery.     Medication history interview sources:  patient    Changes made to PTA medication list (reason)  Added: calcium vitamin D,   Deleted: none, calcium  Changed: none    Additional medication history information (including reliability of information, actions taken by pharmacist):    -- No recent (within 30 days) course of antibiotics  -- No recent (within 30 days) chronic daily medications stopped   -- Patient declines being on any other prescription or over-the-counter medications    Prior to Admission medications    Medication Sig Last Dose Taking? Auth Provider   ACETAMINOPHEN PO Take 1-2 tablets by mouth every 8 hours as needed for pain Taking Yes Unknown, Entered By History   acetaminophen-codeine (TYLENOL WITH CODEINE #3) 300-30 MG per tablet Take 1-2 tablets by mouth every 6 hours as needed for pain  Patient taking differently: Take 1-2 tablets by mouth every 6 hours as needed for pain (migraines)  Taking Yes Nasim Mckeon MD   amoxicillin (AMOXIL) 500 MG capsule Take 4 capsules (2,000 mg) by mouth once as needed Prior to dental procedures Taking Yes Lisa Martinez DO   atorvastatin (LIPITOR) 20 MG tablet Take 1 tablet (20 mg) by mouth daily  Patient taking differently: Take 20 mg by mouth every evening  Taking Yes Lisa Martinez DO   calcium carbonate 600 mg-vitamin D 400 units (CALTRATE) 600-400 MG-UNIT per tablet Take 1 tablet by mouth 2 times daily Taking Yes Unknown, Entered By History   cilostazol (PLETAL) 100 MG tablet Take 1 tablet (100 mg) by mouth 2 times daily Taking Yes Lisa Martinez DO   clopidogrel (PLAVIX) 75 MG tablet Take 1 tablet (75 mg) by mouth daily Taking Yes Lisa Martinez DO   Lactobacillus-Inulin (CULTURELLE  DIGESTIVE HEALTH) CAPS TAKE ONE CAPSULE BY MOUTH EVERY DAY Taking Yes Lisa Martinez DO   losartan (COZAAR) 25 MG tablet TAKE ONE-HALF TABLET (12.5MG) BY MOUTH EVERY DAY Taking Yes Lisa Martinez DO   metoprolol tartrate (LOPRESSOR) 100 MG tablet Take 1 tablet (100 mg) by mouth 2 times daily Taking Yes Lisa Martinez DO   NIFEdipine ER osmotic (PROCARDIA XL) 90 MG 24 hr tablet Take 1 tablet (90 mg) by mouth daily Taking Yes Lisa Martinez DO   predniSONE (DELTASONE) 5 MG tablet Take 1 tablet (5 mg) by mouth daily Taking Yes Hitesh See MD   sirolimus (GENERIC EQUIVALENT) 1 MG tablet Take 2 tablets (2 mg) by mouth daily Taking Yes Hitesh See MD   ORDER FOR DME Equipment being ordered: Lisa Jo DO       Medication history completed by: Romulo Michel, [facility]

## 2018-09-19 NOTE — PATIENT INSTRUCTIONS
Preparing for Your Surgery      Name:  Maribel Yang   MRN:  [id number]   :  1950   Today's Date:  2018     Arriving for surgery:  Surgery date:  18  Arrival time:  6:00AM  Please come to:       [facility] [address]  [address] [place]  [address]    -   parking is available in front of the hospital from 5:15 am to 8:00 pm    -  Stop at the Information Desk in the lobby    -   Inform the information person that you are here for surgery. An escort to 3c will be provided. If you would not like an escort, please proceed to 3C on the 3rd floor. [phone]     What can I eat or drink?  -  You may have solid food or milk products until 8 hours prior to your surgery. Midnight  -  You may have water, apple juice or 7up/Sprite until 2 hours prior to your surgery. 18, 6AM    Which medicines can I take?  Stop clopidogrel(Plavix) 5 days prior to surgery.  Stop Cilostazol(Pletal) 2 days prior to surgery.   -  Do NOT take these medications in the morning, the day of surgery:    Losartan  Calcium   Probiotic    -  Please take these medications the day of surgery:    Nifedipine  Prednisone   Sirolimus    How do I prepare myself?  -  Take two showers: one the night before surgery; and one the morning of surgery.         Use Scrubcare or Hibiclens to wash from neck down.  You may use your own shampoo and conditioner. No other hair products.   -  Do NOT use lotion, powder, deodorant, or antiperspirant the day of your surgery.  -  Do NOT wear any makeup, fingernail polish or jewelry.  - Do not bring your own medications to the hospital, except for inhalers and eye drops.  -  Bring your ID and insurance card.    Questions or Concerns:  -If you have questions or concerns regarding the day of surgery, please call [phone].     -For questions after surgery please call your surgeons office.

## 2018-09-19 NOTE — PHARMACY - PREOPERATIVE ASSESSMENT CENTER
PREOPERATIVE PAIN MEDICATION ALLERGY REVIEW    Maribel Yang was seen and interviewed during time of PAC Clinic appointment on September 19, 2018.  She is scheduled for surgery on 9/25/18 with Dr. Garcia for Exam Under Anesthesia, Colposcopy, CO2 Laser Of Vagina And Cervix.      History of allergies/adverse drug reactions to the following opioid pain medications:   ultracet (tramadol/acetaminophen) - hives  Hydrocodone -  n/v, hives  Fentanyl - nausea in past, but has had subsequently and did tolerate fentanyl and is open to using this for the upcoming procedure if needed     She has tolerated the following opioid pain medications in the past:   Oxycodone, morphine, codeine  Patient thinks she has tolerated hydromorphone in the past as well.     Assessment/Plan:  --    For her procedure recommend multimodal pain management approach and avoiding tramadol, hydrocodone.    --    Consider using fentanyl IV or oxycodone PO if opioid pain medications are necessary.  Note, consider avoiding morphine given renal function.   --    Monitor closely for any allergic reaction or adverse drug reaction and withhold offending medication and treat reaction as necessary.   --    Defer final mediation selection to anesthesia and surgery team.     If there are any further questions regarding this note or further recommendations are needed please contact the inpatient pain management service.  Inpatient Pain Service contact info: pager [phone] from 7AM - 3 PM Mon-Fri or call [phone] for the on-call pain specialist after hours, weekends and holidays.     Romulo Michel  September 19, 2018
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
## 2018-09-19 NOTE — ANESTHESIA PREPROCEDURE EVALUATION
Anesthesia Evaluation     . Pt has had prior anesthetic. Type: General and MAC    History of anesthetic complications   - PONV        ROS/MED HX    ENT/Pulmonary: Comment: 10.5 pack year smoking history    (+)tobacco use, Past use 0.3 PPD for 35 years, quit 2014 packs/day  , . Other pulmonary disease History of lung cancer s/p radiation.    Neurologic:  - neg neurologic ROS     Cardiovascular: Comment: Claudication, on Petal and Plavix    (+) Dyslipidemia, hypertension-range: 140s/60-80s, Peripheral Vascular Disease-- Other, CAD (Nonobstructive ), --. Taking blood thinners Pt has received instructions: Instructions Given to patient: Will hold Plavix for 5 days and Pletal for 2 days. . . :. . Previous cardiac testing date:results:Stress Testdate:2014 results:ECG reviewed date:3/2018 results:SBCath date: 2004 results:Nonobstructive CAD          METS/Exercise Tolerance:  3 - Able to walk 1-2 blocks without stopping   Hematologic:  - neg hematologic  ROS   (+) History of blood clots pt is not anticoagulated, -     (-) History of Transfusion   Musculoskeletal:  - neg musculoskeletal ROS       GI/Hepatic:     (+) Other GI/Hepatic chronic diarrhea and weight loss      Renal/Genitourinary: Comment: Alport disease    (+) chronic renal disease, type: ESRD, Pt does not require dialysis, Pt has history of transplant, date: 2004,       Endo:     (+) Chronic steroid usage for Post Transplant Immunosuppression .      Psychiatric:  - neg psychiatric ROS       Infectious Disease:  - neg infectious disease ROS       Malignancy:   (+) Malignancy History of Lung, Other and Skin  Lung CA Remission status post Radiation. Skin CA Remission status post Surgery, Other CA anal dysplasia, TAL III Active status post Surgery         Other:    (+) No chance of pregnancy C-spine cleared: N/A, no H/O Chronic Pain,no other significant disability                    Physical Exam  Normal systems: cardiovascular, pulmonary and dental    Airway    Mallampati: II  TM distance: >3 FB  Neck ROM: full    Dental     Cardiovascular   Rhythm and rate: regular and normal      Pulmonary    breath sounds clear to auscultation    Other findings:   Results for MARIBEL YANG (MRN [id number]) as of 9/19/2018 14:14    8/10/2018 14:14  Sodium: 138  Potassium: 3.7  Chloride: 108  Carbon Dioxide: 24  Urea Nitrogen: 18  Creatinine: 1.24 (H)  GFR Estimate: 43 (L)  GFR Estimate If Black: 52 (L)  Calcium: 9.2  Anion Gap: 7  Albumin: 3.2 (L)  Protein Total: 7.4  Bilirubin Total: 0.3  Alkaline Phosphatase: 102  ALT: 18  AST: 14  Cholesterol: 161  HDL Cholesterol: 53  LDL Cholesterol Calculated: 71  Non HDL Cholesterol: 108  Triglycerides: 181 (H)  Glucose: 108 (H)  WBC: 6.2  Hemoglobin: 13.3  Hematocrit: 39.5  Platelet Count: 222  RBC Count: 4.33  MCV: 91  MCH: 30.7  MCHC: 33.7  RDW: 15.0  Diff Method: Automated Method  % Neutrophils: 90.5  % Lymphocytes: 3.7  % Monocytes: 4.7  % Eosinophils: 0.6  % Basophils: 0.2  % Immature Granulocytes: 0.3  Nucleated RBCs: 0  Absolute Neutrophil: 5.6  Absolute Lymphocytes: 0.2 (L)  Absolute Monocytes: 0.3  Absolute Eosinophils: 0.0  Absolute Basophils: 0.0  Abs Immature Granulocytes: 0.0  Absolute Nucleated RBC: 0.0  Hepatitis C Antibody: Nonreactive  EBV DNA Copies/mL: 4494 (A)  EBV DNA Log of Copies: 3.7 (H)           PAC Discussion and Assessment    ASA Classification: 3  Case is suitable for: [place]  Anesthetic techniques and relevant risks discussed: GA  Invasive monitoring and risk discussed: Yes  Types:   Possibility and Risk of blood transfusion discussed:   NPO instructions given:   Additional anesthetic preparation and risks discussed:   Needs early admission to pre-op area:   Other:     PAC Resident/NP Anesthesia Assessment:  Maribel Yang is a 66 yo female scheduled for Exam Under Anesthesia, Colposcopy, CO2 Laser Of Vagina And Cervix on 9/25/2018 by Dr. Garcia in treatment of YUNIOR III     Previous anesthesia with  PONV    1) Cardiac: HTN, well managed. EKG 3/2018 sinus clinton. PAD with claudication, maintained on Plavix and pletal.  2) Pulmonary: Smoked 0.3 PPD for 35 years, quit 2014. Lung cancer in 2014, s/p radiation  3) Heme: single incident of DVT at age 17, no recurrence   4) : ESRD r/t Alport's disease, s/p kidney transplant in 2004. 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  She is scheduled for surgery on 9/25/18 with Dr. Garcia for Exam Under Anesthesia, Colposcopy, CO2 Laser Of Vagina And Cervix.      History of allergies/adverse drug reactions to the following opioid pain medications:   ultracet (tramadol/acetaminophen) - hives  Hydrocodone -  n/v, hives  Fentanyl - nausea in past, but has had subsequently and did tolerate fentanyl and is open to using this for the upcoming procedure if needed     She has tolerated the following opioid pain medications in the past:   Oxycodone, morphine, codeine  Patient thinks she has tolerated hydromorphone in the past as well.     Assessment/Plan:  --    For her procedure recommend multimodal pain management approach and avoiding tramadol, hydrocodone.    --    Consider using fentanyl IV or oxycodone PO if opioid pain medications are necessary.  Note, consider  avoiding morphine given renal function.   --    Monitor closely for any allergic reaction or adverse drug reaction and withhold offending medication and treat reaction as necessary.   --    Defer final mediation selection to anesthesia and surgery team.     If there are any further questions regarding this note or further recommendations are needed please contact the inpatient pain management service.  Inpatient Pain Service contact info: pager [phone] from 7AM - 3 PM Mon-Fri or call [phone] for the on-call pain specialist after hours, weekends and holidays.     Romulo Michel  September 19, 2018    Reviewed and Signed by PAC Pharmacist  Pharmacist: LESA  Date: 9/19/18  Time:1306      Anesthesia Plan      History & Physical Review  History and physical reviewed and following examination; no interval change.    ASA Status:  3 .    NPO Status:  > 8 hours    Plan for MAC Maintenance will be TIVA.  Reason for MAC:  Deep or markedly invasive procedure (G8)  PONV prophylaxis:  Ondansetron (or other 5HT-3)       Postoperative Care  Postoperative pain management:  Multi-modal analgesia.      Consents  Anesthetic plan, risks, benefits and alternatives discussed with:  Patient.  Use of blood products discussed: No .   .                          .
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
## 2018-09-24 ENCOUNTER — TELEPHONE (OUTPATIENT)
Dept: TRANSPLANT | Facility: CLINIC | Age: 68
End: 2018-09-24

## 2018-09-24 NOTE — TELEPHONE ENCOUNTER
Subsequent call placed to pt. She states that she received a 3 months supply with several refills.

## 2018-09-24 NOTE — TELEPHONE ENCOUNTER
Received call from patient stating she received a 3 month supply of Rapamune from [employer]. This writer has yet to receive an official acceptance notification to the program but patient was pleased she received a 90 day supply.     Rosie Pascual [facility]    Kidney/Pancreas/Auto Islet Transplant Programs

## 2018-09-25 ENCOUNTER — HOSPITAL ENCOUNTER (OUTPATIENT)
Facility: CLINIC | Age: 68
Discharge: HOME | End: 2018-09-25
Attending: OBSTETRICS & GYNECOLOGY | Admitting: OBSTETRICS & GYNECOLOGY
Payer: COMMERCIAL

## 2018-09-25 ENCOUNTER — ANESTHESIA (OUTPATIENT)
Dept: SURGERY | Facility: CLINIC | Age: 68
End: 2018-09-25
Payer: COMMERCIAL

## 2018-09-25 ENCOUNTER — SURGERY (OUTPATIENT)
Age: 68
End: 2018-09-25
Payer: COMMERCIAL

## 2018-09-25 VITALS
TEMPERATURE: 97.8 F | DIASTOLIC BLOOD PRESSURE: 83 MMHG | SYSTOLIC BLOOD PRESSURE: 146 MMHG | RESPIRATION RATE: 14 BRPM | OXYGEN SATURATION: 96 % | HEIGHT: 62 IN | WEIGHT: 100.97 LBS | BODY MASS INDEX: 18.58 KG/M2

## 2018-09-25 DIAGNOSIS — N87.9 CERVICAL DYSPLASIA: ICD-10-CM

## 2018-09-25 DIAGNOSIS — G89.18 POSTOPERATIVE PAIN: Primary | ICD-10-CM

## 2018-09-25 LAB
ABO + RH BLD: NORMAL
ABO + RH BLD: NORMAL
BLD GP AB SCN SERPL QL: NORMAL
BLOOD BANK CMNT PATIENT-IMP: NORMAL
GLUCOSE BLDC GLUCOMTR-MCNC: 92 MG/DL (ref 70–99)
SPECIMEN EXP DATE BLD: NORMAL

## 2018-09-25 PROCEDURE — 86850 RBC ANTIBODY SCREEN: CPT | Performed by: ANESTHESIOLOGY

## 2018-09-25 PROCEDURE — 37000008 ZZH ANESTHESIA TECHNICAL FEE, 1ST 30 MIN: Performed by: OBSTETRICS & GYNECOLOGY

## 2018-09-25 PROCEDURE — 86901 BLOOD TYPING SEROLOGIC RH(D): CPT | Performed by: ANESTHESIOLOGY

## 2018-09-25 PROCEDURE — 82962 GLUCOSE BLOOD TEST: CPT

## 2018-09-25 PROCEDURE — 36000057 ZZH SURGERY LEVEL 3 1ST 30 MIN - UMMC: Performed by: OBSTETRICS & GYNECOLOGY

## 2018-09-25 PROCEDURE — 57061 DESTRUCTION VAG LESIONS SMPL: CPT | Mod: GC | Performed by: OBSTETRICS & GYNECOLOGY

## 2018-09-25 PROCEDURE — 25000125 ZZHC RX 250: Performed by: OBSTETRICS & GYNECOLOGY

## 2018-09-25 PROCEDURE — 25000125 ZZHC RX 250: Performed by: NURSE ANESTHETIST, CERTIFIED REGISTERED

## 2018-09-25 PROCEDURE — 25000128 H RX IP 250 OP 636: Performed by: NURSE ANESTHETIST, CERTIFIED REGISTERED

## 2018-09-25 PROCEDURE — 25000128 H RX IP 250 OP 636: Performed by: ANESTHESIOLOGY

## 2018-09-25 PROCEDURE — 85610 PROTHROMBIN TIME: CPT | Performed by: ANESTHESIOLOGY

## 2018-09-25 PROCEDURE — 36000059 ZZH SURGERY LEVEL 3 EA 15 ADDTL MIN UMMC: Performed by: OBSTETRICS & GYNECOLOGY

## 2018-09-25 PROCEDURE — 37000009 ZZH ANESTHESIA TECHNICAL FEE, EACH ADDTL 15 MIN: Performed by: OBSTETRICS & GYNECOLOGY

## 2018-09-25 PROCEDURE — 71000027 ZZH RECOVERY PHASE 2 EACH 15 MINS: Performed by: OBSTETRICS & GYNECOLOGY

## 2018-09-25 PROCEDURE — 86900 BLOOD TYPING SEROLOGIC ABO: CPT | Performed by: ANESTHESIOLOGY

## 2018-09-25 PROCEDURE — 27210794 ZZH OR GENERAL SUPPLY STERILE: Performed by: OBSTETRICS & GYNECOLOGY

## 2018-09-25 PROCEDURE — 40000170 ZZH STATISTIC PRE-PROCEDURE ASSESSMENT II: Performed by: OBSTETRICS & GYNECOLOGY

## 2018-09-25 PROCEDURE — 57513 LASER SURGERY OF CERVIX: CPT | Mod: GC | Performed by: OBSTETRICS & GYNECOLOGY

## 2018-09-25 RX ORDER — IBUPROFEN 600 MG/1
600 TABLET, FILM COATED ORAL
Status: DISCONTINUED | OUTPATIENT
Start: 2018-09-25 | End: 2018-09-25 | Stop reason: HOSPADM

## 2018-09-25 RX ORDER — NALOXONE HYDROCHLORIDE 0.4 MG/ML
.1-.4 INJECTION, SOLUTION INTRAMUSCULAR; INTRAVENOUS; SUBCUTANEOUS
Status: DISCONTINUED | OUTPATIENT
Start: 2018-09-25 | End: 2018-09-25 | Stop reason: HOSPADM

## 2018-09-25 RX ORDER — LIDOCAINE 40 MG/G
CREAM TOPICAL
Status: DISCONTINUED | OUTPATIENT
Start: 2018-09-25 | End: 2018-09-25 | Stop reason: HOSPADM

## 2018-09-25 RX ORDER — HYDROMORPHONE HYDROCHLORIDE 1 MG/ML
.3-.5 INJECTION, SOLUTION INTRAMUSCULAR; INTRAVENOUS; SUBCUTANEOUS EVERY 10 MIN PRN
Status: DISCONTINUED | OUTPATIENT
Start: 2018-09-25 | End: 2018-09-25 | Stop reason: HOSPADM

## 2018-09-25 RX ORDER — CEFAZOLIN SODIUM 1 G/3ML
1 INJECTION, POWDER, FOR SOLUTION INTRAMUSCULAR; INTRAVENOUS SEE ADMIN INSTRUCTIONS
Status: DISCONTINUED | OUTPATIENT
Start: 2018-09-25 | End: 2018-09-25 | Stop reason: HOSPADM

## 2018-09-25 RX ORDER — LIDOCAINE HYDROCHLORIDE 20 MG/ML
INJECTION, SOLUTION INFILTRATION; PERINEURAL PRN
Status: DISCONTINUED | OUTPATIENT
Start: 2018-09-25 | End: 2018-09-25

## 2018-09-25 RX ORDER — ONDANSETRON 2 MG/ML
INJECTION INTRAMUSCULAR; INTRAVENOUS PRN
Status: DISCONTINUED | OUTPATIENT
Start: 2018-09-25 | End: 2018-09-25

## 2018-09-25 RX ORDER — IODINE AND POTASSIUM IODIDE 50; 100 MG/ML; MG/ML
LIQUID ORAL PRN
Status: DISCONTINUED | OUTPATIENT
Start: 2018-09-25 | End: 2018-09-25 | Stop reason: HOSPADM

## 2018-09-25 RX ORDER — SODIUM CHLORIDE, SODIUM LACTATE, POTASSIUM CHLORIDE, CALCIUM CHLORIDE 600; 310; 30; 20 MG/100ML; MG/100ML; MG/100ML; MG/100ML
INJECTION, SOLUTION INTRAVENOUS CONTINUOUS
Status: DISCONTINUED | OUTPATIENT
Start: 2018-09-25 | End: 2018-09-25 | Stop reason: HOSPADM

## 2018-09-25 RX ORDER — CEFAZOLIN SODIUM 2 G/100ML
2 INJECTION, SOLUTION INTRAVENOUS
Status: DISCONTINUED | OUTPATIENT
Start: 2018-09-25 | End: 2018-09-25 | Stop reason: HOSPADM

## 2018-09-25 RX ORDER — PROPOFOL 10 MG/ML
INJECTION, EMULSION INTRAVENOUS CONTINUOUS PRN
Status: DISCONTINUED | OUTPATIENT
Start: 2018-09-25 | End: 2018-09-25

## 2018-09-25 RX ORDER — DEXAMETHASONE SODIUM PHOSPHATE 4 MG/ML
INJECTION, SOLUTION INTRA-ARTICULAR; INTRALESIONAL; INTRAMUSCULAR; INTRAVENOUS; SOFT TISSUE PRN
Status: DISCONTINUED | OUTPATIENT
Start: 2018-09-25 | End: 2018-09-25

## 2018-09-25 RX ORDER — CEFAZOLIN SODIUM 1 G/3ML
INJECTION, POWDER, FOR SOLUTION INTRAMUSCULAR; INTRAVENOUS PRN
Status: DISCONTINUED | OUTPATIENT
Start: 2018-09-25 | End: 2018-09-25

## 2018-09-25 RX ORDER — ONDANSETRON 4 MG/1
4 TABLET, ORALLY DISINTEGRATING ORAL EVERY 30 MIN PRN
Status: DISCONTINUED | OUTPATIENT
Start: 2018-09-25 | End: 2018-09-25 | Stop reason: HOSPADM

## 2018-09-25 RX ORDER — ACETIC ACID 5 %
LIQUID (ML) MISCELLANEOUS PRN
Status: DISCONTINUED | OUTPATIENT
Start: 2018-09-25 | End: 2018-09-25 | Stop reason: HOSPADM

## 2018-09-25 RX ORDER — MEPERIDINE HYDROCHLORIDE 25 MG/ML
12.5 INJECTION INTRAMUSCULAR; INTRAVENOUS; SUBCUTANEOUS
Status: DISCONTINUED | OUTPATIENT
Start: 2018-09-25 | End: 2018-09-25 | Stop reason: HOSPADM

## 2018-09-25 RX ORDER — ONDANSETRON 2 MG/ML
4 INJECTION INTRAMUSCULAR; INTRAVENOUS EVERY 30 MIN PRN
Status: DISCONTINUED | OUTPATIENT
Start: 2018-09-25 | End: 2018-09-25 | Stop reason: HOSPADM

## 2018-09-25 RX ORDER — IBUPROFEN 600 MG/1
600 TABLET, FILM COATED ORAL EVERY 6 HOURS PRN
Qty: 30 TABLET | Refills: 0 | Status: SHIPPED | OUTPATIENT
Start: 2018-09-25 | End: 2019-03-13

## 2018-09-25 RX ADMIN — CEFAZOLIN 2 G: 1 INJECTION, POWDER, FOR SOLUTION INTRAMUSCULAR; INTRAVENOUS at 08:12

## 2018-09-25 RX ADMIN — MIDAZOLAM 2 MG: 1 INJECTION INTRAMUSCULAR; INTRAVENOUS at 08:00

## 2018-09-25 RX ADMIN — HYDROMORPHONE HYDROCHLORIDE 0.2 MG: 1 INJECTION, SOLUTION INTRAMUSCULAR; INTRAVENOUS; SUBCUTANEOUS at 08:26

## 2018-09-25 RX ADMIN — HYDROMORPHONE HYDROCHLORIDE 0.3 MG: 1 INJECTION, SOLUTION INTRAMUSCULAR; INTRAVENOUS; SUBCUTANEOUS at 08:12

## 2018-09-25 RX ADMIN — SODIUM CHLORIDE, POTASSIUM CHLORIDE, SODIUM LACTATE AND CALCIUM CHLORIDE: 600; 310; 30; 20 INJECTION, SOLUTION INTRAVENOUS at 08:00

## 2018-09-25 RX ADMIN — PROPOFOL 150 MCG/KG/MIN: 10 INJECTION, EMULSION INTRAVENOUS at 08:05

## 2018-09-25 RX ADMIN — DEXAMETHASONE SODIUM PHOSPHATE 4 MG: 4 INJECTION, SOLUTION INTRA-ARTICULAR; INTRALESIONAL; INTRAMUSCULAR; INTRAVENOUS; SOFT TISSUE at 08:47

## 2018-09-25 RX ADMIN — LIDOCAINE HYDROCHLORIDE 60 MG: 20 INJECTION, SOLUTION INFILTRATION; PERINEURAL at 08:05

## 2018-09-25 RX ADMIN — ONDANSETRON 4 MG: 2 INJECTION INTRAMUSCULAR; INTRAVENOUS at 08:47

## 2018-09-25 RX ADMIN — IODINE SOLUTION STRONG 5% (LUGOL'S) 14 ML: 5 SOLUTION at 08:39

## 2018-09-25 RX ADMIN — Medication 60 ML: at 08:38
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
## 2018-09-25 NOTE — PROGRESS NOTES
Gynecologic Oncology Postoperative Check Note  9/25/2018    S: Patient doing well postoperatively. Pain is well controlled. She voided 800cc. Ambulated without dizziness. Eating breakfast without nausea or vomiting. Denies headaches, vision changes, chest pain, shortness of breath, increased extremity swelling.    O:  Vitals:    09/25/18 0900 09/25/18 0915 09/25/18 0945 09/25/18 1000   BP: 155/82 150/78 153/69 146/83   Resp: 12 12 14 14   Temp:    97.8  F (36.6  C)   TempSrc:    Oral   SpO2: 97% 92% 91% 96%   Weight:       Height:         Gen: alert and oriented, resting in chair  Cardio: rrr, nl s1 and s2, no m/r/g  Resp: anterior lung fields clear, no wheezes or crackles  Abdomen: soft, non-tender  Extremities: BLEs non-tender     A: 67 year old POD#0 s/p EUA, CO2 laser ablation of upper vagina and cervix. Doing well postoperatively.  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 Garcia in 2-3 weeks.    Vicky Garcias MD  OB/GYN PGY-1  9/25/2018 9:50 AM  Gyn Onc pgr [phone]

## 2018-09-25 NOTE — ANESTHESIA POSTPROCEDURE EVALUATION
Patient: Maribel Yang    Procedure(s):  Exam Under Anesthesia, CO2 Laser Ablation of Upper Vagina and Cervix - Wound Class: II-Clean Contaminated    Diagnosis:Cervical Dysplasia   Diagnosis Additional Information: No value filed.    Anesthesia Type:  No value filed.    Note:  Anesthesia Post Evaluation         Comments: Patient location during evaluation: Phase 1  Patient participation: Able to fully participate in evaluation  Level of consciousness: awake  Pain management: adequate  Airway patency: patent  Cardiovascular status: acceptable  Respiratory status: acceptable  Hydration status: acceptable  PONV: none     Anesthetic complications: None        Last vitals:  Vitals:    09/25/18 0915 09/25/18 0945 09/25/18 1000   BP: 150/78 153/69 146/83   Resp: 12 14 14   Temp:   36.6  C (97.8  F)   SpO2: 92% 91% 96%         Electronically Signed By: Jose Henning MD  September 25, 2018  12:30 PM

## 2018-09-25 NOTE — DISCHARGE INSTRUCTIONS
[facility]  Same-Day Surgery   Adult Discharge Orders & Instructions     For 24 hours after surgery    1. Get plenty of rest.  A responsible adult must stay with you for at least 24 hours after you leave the hospital.   2. Do not drive or use heavy equipment.  If you have weakness or tingling, don't drive or use heavy equipment until this feeling goes away.  3. Do not drink alcohol.  4. Avoid strenuous or risky activities.  Ask for help when climbing stairs.   5. You may feel lightheaded.  IF so, sit for a few minutes before standing.  Have someone help you get up.   6. If you have nausea (feel sick to your stomach): Drink only clear liquids such as apple juice, ginger ale, broth or 7-Up.  Rest may also help.  Be sure to drink enough fluids.  Move to a regular diet as you feel able.  7. You may have a slight fever. Call the doctor if your fever is over 100 F (37.7 C) (taken under the tongue) or lasts longer than 24 hours.  8. You may have a dry mouth, a sore throat, muscle aches or trouble sleeping.  These should go away after 24 hours.  9. Do not make important or legal decisions.   Call your doctor for any of the followin.  Signs of infection (fever, growing tenderness at the surgery site, a large amount of drainage or bleeding, severe pain, foul-smelling drainage, redness, swelling).    2. It has been over 8 to 10 hours since surgery and you are still not able to urinate (pass water).    3.  Headache for over 24 hours.    To contact a doctor, call Dr Garcia's office at [phone] or:        [phone] and ask for the resident on call for   GYN/ONC (answered 24 hours a day)      Emergency Department:    [facility]: [phone]       (TTY for hearing impaired: [phone])

## 2018-09-25 NOTE — IP AVS SNAPSHOT
Same Day Surgery [address] [address]    Phone:  [phone]                                       After Visit Summary   9/25/2018    Maribel Yang    MRN: [id number]           After Visit Summary Signature Page     I have received my discharge instructions, and my questions have been answered. I have discussed any challenges I see with this plan with the nurse or doctor.    ..........................................................................................................................................  Patient/Patient Representative Signature      ..........................................................................................................................................  Patient Representative Print Name and Relationship to Patient    ..................................................               ................................................  Date                                   Time    ..........................................................................................................................................  Reviewed by Signature/Title    ...................................................              ..............................................  Date                                               Time          22EPIC Rev 08/18

## 2018-09-25 NOTE — ANESTHESIA CARE TRANSFER NOTE
Patient: Maribel Yang    Procedure(s):  Exam Under Anesthesia, CO2 Laser Ablation of Upper Vagina and Cervix - Wound Class: II-Clean Contaminated    Diagnosis: Cervical Dysplasia   Diagnosis Additional Information: No value filed.    Anesthesia Type:   No value filed.     Note:  Airway :Room Air  Patient transferred to:Phase II  Comments: VSS, alert, patent airway, report to RN.Handoff Report: Identifed the Patient, Identified the Reponsible Provider, Reviewed the pertinent medical history, Discussed the surgical course, Reviewed Intra-OP anesthesia mangement and issues during anesthesia, Set expectations for post-procedure period and Allowed opportunity for questions and acknowledgement of understanding      Vitals: (Last set prior to Anesthesia Care Transfer)    CRNA VITALS  9/25/2018 0818 - 9/25/2018 0858      9/25/2018             SpO2: 97 %    Resp Rate (observed): (!)  1    Resp Rate (set): 10                Electronically Signed By: SANDRA Ochoa CRNA  September 25, 2018  8:58 AM

## 2018-09-25 NOTE — OP NOTE
Operative Note    Patient: Maribel Yang  : 1950  MRN: [id number]    Date of Service: 2018    Pre-operative diagnosis:  1. 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 S/p kidney transplant, immunosuppressed  3. Squamous cell carcinoma of lung, s/p radiation therapy  4. Anal invasive SCC  5. H/o DVT  6. HTN  7. Basal cell carcinoma    Post-operative diagnosis:  Same s/p CO2 laser ablation of cervix/vagina    Procedure:   1. EUA, CO2 laser ablation of upper vagina and cervix    Surgeon: Pati Garcia MD  Assistants: Vicky Garcias MD, PGY-1    Anesthesia: MAC    EBL: 0 cc  Urine: not measured  Fluids: see anesthesia record    Specimens: None  Complications: none apparent    Findings: Normal appearing external female genitalia. Cervix is foreshortened and flush with vagina. No gross lesions. Acetic acid applied with multiple acetowhite changes noted over the cervix and upper vagina.     Indications: Maribel Yang is a 67 year old female with extensive history of vaginal and cervical dysplasia. On 18, she underwent pap and vaginal biopsies. Pap demonstrated ASC-H with HPV negative. Biopsy of the left vaginal apex showed VAIN II. Laser ablation was recommended. Discussed risks, benefits, and alternatives to the procedure including risk of infection, bleeding, recurrence. The patient's questions were answered, understanding confirmed, and the patient signed written informed consent.    Procedure: The patient was taken to the OR where she was placed in the dorsal lithotomy position with feet in stirrups. MAC anesthesia was administered. The patient was prepped and draped in the usual sterile fashion. A procedure and laser time out were performed. A coated speculum was placed in the vagina and the cervix visualized. Acetic acid was applied to the vagina and cervix, with acetowhite changes noted  over the cervix and upper vaginal mucosa.  Wet sterile towels were placed around the surgical field. The CO2 laser was set at 10 love continuous. The laser was activated and the acetowhite areas were systematically vaporized. The eschar was subsequently wiped away, and an additional 2 passes with the laser were completed. After thorough inspection of the vaporized area, the laser was turned off, speculum removed and patient cleaned. Instrument, needle, and sponge counts were correct times 2. Dr. Garcia was present for the entire procedure. The patient tolerated the procedure well and transferred to the PACU in stable condition.    Vicky Garcias MD  Gyn Onc PGY-1  Gyn Oncology Pager: [phone]      Attending Attestation:  I was present and scrubbed for the entire surgical procedure.  I have reviewed and edited above note and agree with findings as documented.    Pati Garcia MD  Gynecologic Oncology  [facility]

## 2018-09-25 NOTE — OR NURSING
Discharge instructions given to pt and responsible adult (gave instructions over the phone to Ted).  All questions regarding discharge information answered.  Pt and responsible adult verbalized understanding of all discharge instruction information given.  IV removed.  All belongings returned to pt.  Pt discharge to lobby via wheelchair.

## 2018-10-04 NOTE — PROGRESS NOTES
Consult Notes on Referred Patient    Date: 9/4/2018     Patient presents today for evaluation.    Her history is as follows:  Brief Cancer History:   1/07: + HPV 6 Low risk   10/07: ASCUS, + HPV 56, 54 & 6. 12/07: [place]: TAL II   3/11/08: LEEP - TAL II   8/08: ASCUS, ECC atypia, +HPV 82   9/08: [place] - Atypia   5/09: NIL pap, 11/09: NIL pap, neg HPV   4/13: LSIL, + HPV 33 or 45, 61.   5/15/13: [place] - VAIN II & III,TAL II. Referred to gyn onc.   6/20/13: [place] with gyn onc. Plan LEEP and CO2 laser to vagina, cx and vaginal vault.   7/17/13: [facility] in OR, Co2 laser vagina and vulva , YUNIOR 1- 2, AIN 2-3, VAIN 2-3, LEEP, ECC negative.  12/2/13: Colonoscopy negative. Pap ASC-H  5/8/14:  Pap ASCUS/AGC  5/22/14:  Pap ASCUS, Anal pap ASC-US, labial biopsy negative  7/15/14:  Pap LSIL, right & left vulvar biopsy HSIL, CO2 laser  4/2/15:  Pap ASCUS, vaginal suburethral biopsy VIN1-2  5/26/16:  Atypical glandular cells, pap    6/28/16:  EMB, ECC.  Endometrial biopsy with superficial atrophic benign endometrium.  ECC CIN3    8/17/16:  EUA, colposcopy vagina, LEEP, Vaginal biopsies.  LEEP CIN1, margins negative.  ECC with reactive change, vaginal biopsy VIN1       6/1/17: ECC + cervical biopsy negative, pap NILM + HPV    3/14/18:  Invasive anal squamous cell carcinoma, excised but close margins.    Underwent chemoradiation completed on 6/11/18.    The patient returns today for follow-up related to her cervical and vaginal dysplasia.  She has no specific gyn concerns.  Completed therapy for her anal cancer.     Review of Systems:  Answers for HPI/ROS submitted by the patient on 9/3/2018   General Symptoms: No  Skin Symptoms: Yes  HENT Symptoms: No  EYE SYMPTOMS: No  HEART SYMPTOMS: No  LUNG SYMPTOMS: No  INTESTINAL SYMPTOMS: Yes  URINARY SYMPTOMS: No  GYNECOLOGIC SYMPTOMS: No  BREAST SYMPTOMS: No  SKELETAL SYMPTOMS: No  BLOOD SYMPTOMS: No  NERVOUS SYSTEM SYMPTOMS: No  MENTAL HEALTH SYMPTOMS: No  Changes in  hair: No  Changes in moles/birth marks: No  Itching: No  Rashes: No  Changes in nails: No  Acne: No  Hair in places you don't want it: No  Change in facial hair: No  Warts: No  Non-healing sores: Yes  Scarring: No  Flaking of skin: No  Color changes of hands/feet in cold : No  Sun sensitivity: No  Skin thickening: No  Heart burn or indigestion: Yes  Nausea: Yes  Vomiting: No  Abdominal pain: Yes  Bloating: Yes  Constipation: No  Diarrhea: Yes  Blood in stool: No  Black stools: No  Rectal or Anal pain: No  Fecal incontinence: Yes  Yellowing of skin or eyes: No  Vomit with blood: No  Change in stools: No    Past Medical History:    Past Medical History:   Diagnosis Date     Abnormal coagulation profile     p [id number]>A heterozygote      Anemia      Antiplatelet or antithrombotic long-term use      ASCUS with positive high risk HPV 2007, 2015    + HPV 56, 54,& 6, colp - TAL III, [place] =TAL II     Basal cell carcinoma      Hypertension      Immunosuppressed status (H)     due meds     Kidney replaced by transplant 9/04    Living donor recipient,  Rejection 7/2005     LSIL (low grade squamous intraepithelial lesion) on Pap smear 4/2013    +HPV 33 or 45, 61       PAD (peripheral artery disease) (H)      PONV (postoperative nausea and vomiting)      Squamous cell lung cancer (H)      Thrombosis of leg 1967     Unspecified disorder of kidney and ureter     X-linked dominant Alport's syndrome.         Past Surgical History:    Past Surgical History:   Procedure Laterality Date     BIOPSY ANAL N/A 3/14/2018    Procedure: BIOPSY ANAL;;  Surgeon: Shabbir Leo MD;  Location:  OR      NONSPECIFIC PROCEDURE      Thrombectomy     C NONSPECIFIC PROCEDURE  1955 and 1959    [place]il[place] eye surgery - correction for crossed eyes     C NONSPECIFIC PROCEDURE  1998    oopherectomy L     C NONSPECIFIC PROCEDURE  1967    open kidney biopsy - L     C TRANSPLANTATION OF KIDNEY  9/04    recipient -- done [facility]     COLONOSCOPY        COLONOSCOPY N/A 8/9/2017    Procedure: COMBINED COLONOSCOPY, SINGLE OR MULTIPLE BIOPSY/POLYPECTOMY BY BIOPSY;;  Surgeon: Sushil Hyatt MD;  [facility]     COLPOSCOPY,LOOP ELECTRD CERVIX EXCIS  03/11/08    TAL II     CONIZATION LEEP  7/17/2013    Procedure: CONIZATION LEEP;;  Surgeon: Liliana Renteria MD;  [facility]     CONIZATION LEEP N/A 8/17/2016    Procedure: CONIZATION LEEP;  Surgeon: Liliana Renteria MD;  [facility]     EXAM UNDER ANESTHESIA ANUS  7/15/2014    Procedure: EXAM UNDER ANESTHESIA ANUS;  Surgeon: Radha Musa MD;  [facility]     EXAM UNDER ANESTHESIA ANUS N/A 3/14/2018    Procedure: EXAM UNDER ANESTHESIA ANUS;  Anal Exam Under Anesthesia With Excision of anal lesion, proctoscopy;  Surgeon: Shabbir Leo MD;  [facility]     EYE SURGERY       LASER CO2 EXCISE VULVA WIDE LOCAL  7/15/2014    Procedure: LASER CO2 EXCISE VULVA WIDE LOCAL;  Surgeon: Liliana Renteria MD;  [facility]     LASER CO2 VAGINA  7/17/2013    Procedure: LASER CO2 VAGINA;;  Surgeon: Liliana Renteria MD;  [facility]     LASER CO2 VAGINA N/A 9/25/2018    Procedure: LASER CO2 VAGINA;  Exam Under Anesthesia, CO2 Laser Ablation of Upper Vagina and Cervix;  Surgeon: Pati Garcia MD;  [facility]     MICROSCOPY ANAL  7/17/2013    Procedure: MICROSCOPY ANAL;  Anal Microscopy,  EUA vagina,Colposcopy Of Vagina And Vulva, Vaginal Biopsies, Omniguide Co2 Laser To Vagina and vulva, Loop Electrosurgical Excision Procedure To Cervix;  Surgeon: Radha Musa MD;  [facility]     MICROSCOPY ANAL  7/15/2014    Procedure: MICROSCOPY ANAL;  Surgeon: Radha Musa MD;  [facility]         Health Maintenance:  Health Maintenance Due   Topic Date Due     PHQ-2 Q1 YR  02/28/2017     ADVANCE DIRECTIVE PLANNING Q5 YRS  10/03/2017     DEXA SCAN SCREENING (SYSTEM ASSIGNED)  12/15/2017     INFLUENZA VACCINE (1) 09/01/2018     MAMMO Q1 YR   "2018       Current Medications:     has a current medication list which includes the following prescription(s): acetaminophen, atorvastatin, cilostazol, clopidogrel, culturelle digestive health, losartan, metoprolol tartrate, nifedipine er osmotic, prednisone, sirolimus, acetaminophen-codeine, amoxicillin, calcium carbonate 600 mg-vitamin d 400 units, ibuprofen, and order for dme.       Allergies:     [unfilled]        Social History:     Social History   Substance Use Topics     Smoking status: Former Smoker     Packs/day: 0.30     Years: 35.00     Types: Cigarettes     Quit date: 2014     Smokeless tobacco: Never Used      Comment: occ cig     Alcohol use 0.0 oz/week     0 Standard drinks or equivalent per week      Comment: rare       History   Drug Use No           Family History:     The patient's family history is notable for:    Family History   Problem Relation Age of Onset     Diabetes Father      type 2 diag age,60's     Alcohol/Drug Father      Arthritis Father      Hypertension Father      Lipids Father      high cholesterol     Arthritis Mother      Diabetes Mother      Depression Mother      HEART DISEASE Mother      Neurologic Disorder Mother      Obesity Mother      Psychotic Disorder Mother      Thyroid Disease Mother      Gynecology Sister      Precancerous cell removal from cervix at age 45     Depression Sister      Allergies Sister      Alcohol/Drug Sister      Neurologic Disorder Sister      Cerebrovascular Disease Paternal Grandmother       of a stroke in her 80's     Diabetes Paternal Grandmother      Alcohol/Drug Son      Colon Polyps Sister      Colon Cancer No family hx of      Crohn Disease No family hx of      Ulcerative Colitis No family hx of      Melanoma No family hx of      Skin Cancer No family hx of          Physical Exam:     /73  Pulse 74  Temp 98.6  F (37  C) (Oral)  Resp 16  Ht 1.575 m (5' 2\")  Wt 44.9 kg (99 lb)  SpO2 98%  BMI 18.11 kg/m2  Body " mass index is 18.11 kg/(m^2).    General Appearance: healthy and alert, no distress     Musculoskeletal: extremities non tender and without edema    Skin: no lesions or rashes     Neurological: normal gait, no gross defects     Psychiatric: appropriate mood and affect                               Hematological: normal cervical, supraclavicular and inguinal lymph nodes     Gastrointestinal:       abdomen soft, non-tender, non-distended, no organomegaly or masses    Colposcopy Note:    Written and verbal informed consent obtained.    Prior to the start of the procedure and with procedural staff participation, I verbally confirmed the patient s identity using two indicators, relevant allergies, that the procedure was appropriate and matched the consent or emergent situation, and that the correct equipment/implants were available. Immediately prior to starting the procedure I conducted the Time Out with the procedural staff and re-confirmed the patient s name, procedure, and site/side. (The Joint Commission universal protocol was followed.)  Yes    Sedation (Moderate or Deep): None    Genitourinary: External genitalia and urethral meatus appears normal, BUS negative, no lesions. Vagina is smooth without nodularity or masses.  Vaginal apex and cervix scarred from multiple procedures.    After placement of speculum and full visualization of cervix, colposcopy of cervix and entire vagina performed.  Entire cervix and upper vagina visualized, no gross lesions. Pap smear obtained.  Cervix clean with saline and green filter applied with no abnormal vascularity noted.  5% acetic acid solution applied to cervix and visualized under colposcopic enhancement.  Small os, TMZ not seen. Diffuse acetowhite enhancement upper vagina and cervix with thicker, dense changes at left vaginal apex.  Biopsy taken.  Hemostassis with silver nitrate.      Assessment:    Maribel Yang is a 67 year old woman with long standing history of  cervical and vulvar dysplasia      A total of 45 minutes was spent with the patient, 20 minutes of which were spent in counseling the patient and/or treatment planning, 25 minutes procedure time.         Plan:     1.)    Long standing history of cervical and vulvar dysplasia:  Pap and biopsies done today.  She will be contacted with results and recs for follow-up.       2.) Anal cancer:  Status post surgery, chemoXRT. Follow-up with Medical Oncology and CR.      3.) Labs and/or tests ordered include:  Pap, biopsy    Pati Garcia MD  Gynecologic Oncology  [facility]    CC  Patient Care Team:  Lisa Garcia DO as PCP - General (Family Practice)  Hitesh See MD as MD (Nephrology)  Nyasia Gaines MD as Referring Physician (OB/Gyn)  Joel Davis MD as MD (Family Practice)  Rosalie Pelletier PA-C as Physician Assistant (Physician Assistant)  Liliana Renteria MD as MD (Oncology)  Meggan Tucker MD as MD (Hematology & Oncology)  Kianna Dyer, AVERY as Nurse Coordinator (Hematology & Oncology)  LISA GARCIA

## 2018-10-18 DIAGNOSIS — R19.7 DIARRHEA, UNSPECIFIED TYPE: ICD-10-CM

## 2018-10-18 NOTE — TELEPHONE ENCOUNTER
Culturelle probiotic      Last Written Prescription Date:  7-20-18  Last Fill Quantity: 100,   # refills: 0  Last Office Visit: 8-7-18  Future Office visit:       Routing refill request to provider for review/approval because:  Drug not on the [facility], P or [facility] refill protocol or controlled substance

## 2018-10-19 RX ORDER — LACTOBACILLUS RHAMNOSUS GG 10B CELL
CAPSULE ORAL
Qty: 90 CAPSULE | Refills: 0 | Status: SHIPPED | OUTPATIENT
Start: 2018-10-19 | End: 2019-01-21

## 2018-10-19 NOTE — TELEPHONE ENCOUNTER
FS  Please address due to PN's absence  Routing refill request to provider for review/approval because:  Drug not on the [facility] refill protocol   Thanks, Monse Hopson RN

## 2018-10-30 ENCOUNTER — OFFICE VISIT (OUTPATIENT)
Dept: NEPHROLOGY | Facility: CLINIC | Age: 68
End: 2018-10-30
Attending: INTERNAL MEDICINE
Payer: COMMERCIAL

## 2018-10-30 VITALS
WEIGHT: 99.8 LBS | HEART RATE: 63 BPM | BODY MASS INDEX: 18.25 KG/M2 | TEMPERATURE: 98.5 F | SYSTOLIC BLOOD PRESSURE: 150 MMHG | DIASTOLIC BLOOD PRESSURE: 75 MMHG | OXYGEN SATURATION: 93 %

## 2018-10-30 DIAGNOSIS — C21.1 MALIGNANT NEOPLASM OF ANAL CANAL (H): ICD-10-CM

## 2018-10-30 DIAGNOSIS — Z12.83 SKIN CANCER SCREENING: ICD-10-CM

## 2018-10-30 DIAGNOSIS — Z79.899 ENCOUNTER FOR LONG-TERM (CURRENT) USE OF MEDICATIONS: ICD-10-CM

## 2018-10-30 DIAGNOSIS — Z94.0 KIDNEY REPLACED BY TRANSPLANT: Primary | ICD-10-CM

## 2018-10-30 DIAGNOSIS — Z94.0 HTN, KIDNEY TRANSPLANT RELATED: ICD-10-CM

## 2018-10-30 DIAGNOSIS — I15.1 HTN, KIDNEY TRANSPLANT RELATED: ICD-10-CM

## 2018-10-30 DIAGNOSIS — I12.9 RENAL HYPERTENSION, STAGE 1-4 OR UNSPECIFIED CHRONIC KIDNEY DISEASE: ICD-10-CM

## 2018-10-30 DIAGNOSIS — D84.9 IMMUNOSUPPRESSION (H): ICD-10-CM

## 2018-10-30 DIAGNOSIS — Z48.298 AFTERCARE FOLLOWING ORGAN TRANSPLANT: Primary | ICD-10-CM

## 2018-10-30 DIAGNOSIS — D84.9 IMMUNOSUPPRESSED STATUS (H): ICD-10-CM

## 2018-10-30 DIAGNOSIS — Z94.0 KIDNEY REPLACED BY TRANSPLANT: ICD-10-CM

## 2018-10-30 LAB
ALBUMIN SERPL-MCNC: 3.3 G/DL (ref 3.4–5)
ALP SERPL-CCNC: 123 U/L (ref 40–150)
ALT SERPL W P-5'-P-CCNC: 16 U/L (ref 0–50)
ANION GAP SERPL CALCULATED.3IONS-SCNC: 7 MMOL/L (ref 3–14)
AST SERPL W P-5'-P-CCNC: 19 U/L (ref 0–45)
BASOPHILS # BLD AUTO: 0 10E9/L (ref 0–0.2)
BASOPHILS NFR BLD AUTO: 0.5 %
BILIRUB SERPL-MCNC: 0.3 MG/DL (ref 0.2–1.3)
BUN SERPL-MCNC: 17 MG/DL (ref 7–30)
CALCIUM SERPL-MCNC: 9.4 MG/DL (ref 8.5–10.1)
CHLORIDE SERPL-SCNC: 104 MMOL/L (ref 94–109)
CO2 SERPL-SCNC: 26 MMOL/L (ref 20–32)
CREAT SERPL-MCNC: 1.38 MG/DL (ref 0.52–1.04)
DIFFERENTIAL METHOD BLD: ABNORMAL
EOSINOPHIL # BLD AUTO: 0 10E9/L (ref 0–0.7)
EOSINOPHIL NFR BLD AUTO: 0.6 %
ERYTHROCYTE [DISTWIDTH] IN BLOOD BY AUTOMATED COUNT: 13.8 % (ref 10–15)
GFR SERPL CREATININE-BSD FRML MDRD: 38 ML/MIN/1.7M2
GLUCOSE SERPL-MCNC: 115 MG/DL (ref 70–99)
HCT VFR BLD AUTO: 42.7 % (ref 35–47)
HGB BLD-MCNC: 14.1 G/DL (ref 11.7–15.7)
IMM GRANULOCYTES # BLD: 0 10E9/L (ref 0–0.4)
IMM GRANULOCYTES NFR BLD: 0.5 %
LYMPHOCYTES # BLD AUTO: 0.4 10E9/L (ref 0.8–5.3)
LYMPHOCYTES NFR BLD AUTO: 5.5 %
MCH RBC QN AUTO: 28.9 PG (ref 26.5–33)
MCHC RBC AUTO-ENTMCNC: 33 G/DL (ref 31.5–36.5)
MCV RBC AUTO: 88 FL (ref 78–100)
MONOCYTES # BLD AUTO: 0.4 10E9/L (ref 0–1.3)
MONOCYTES NFR BLD AUTO: 6.1 %
NEUTROPHILS # BLD AUTO: 5.7 10E9/L (ref 1.6–8.3)
NEUTROPHILS NFR BLD AUTO: 86.8 %
NRBC # BLD AUTO: 0 10*3/UL
NRBC BLD AUTO-RTO: 0 /100
PLATELET # BLD AUTO: 249 10E9/L (ref 150–450)
POTASSIUM SERPL-SCNC: 3.8 MMOL/L (ref 3.4–5.3)
PROT SERPL-MCNC: 7.8 G/DL (ref 6.8–8.8)
RBC # BLD AUTO: 4.88 10E12/L (ref 3.8–5.2)
SIROLIMUS BLD-MCNC: 12 UG/L (ref 5–15)
SODIUM SERPL-SCNC: 137 MMOL/L (ref 133–144)
TME LAST DOSE: NORMAL H
WBC # BLD AUTO: 6.5 10E9/L (ref 4–11)

## 2018-10-30 PROCEDURE — 87799 DETECT AGENT NOS DNA QUANT: CPT | Performed by: INTERNAL MEDICINE

## 2018-10-30 PROCEDURE — 36415 COLL VENOUS BLD VENIPUNCTURE: CPT | Performed by: INTERNAL MEDICINE

## 2018-10-30 PROCEDURE — 80195 ASSAY OF SIROLIMUS: CPT | Performed by: INTERNAL MEDICINE

## 2018-10-30 PROCEDURE — 80053 COMPREHEN METABOLIC PANEL: CPT | Performed by: INTERNAL MEDICINE

## 2018-10-30 PROCEDURE — 85025 COMPLETE CBC W/AUTO DIFF WBC: CPT | Performed by: INTERNAL MEDICINE

## 2018-10-30 PROCEDURE — G0463 HOSPITAL OUTPT CLINIC VISIT: HCPCS | Mod: ZF

## 2018-10-30 RX ORDER — LOSARTAN POTASSIUM 25 MG/1
25 TABLET ORAL DAILY
Qty: 90 TABLET | Refills: 5 | Status: SHIPPED | OUTPATIENT
Start: 2018-10-30 | End: 2019-11-25

## 2018-10-30 ASSESSMENT — PAIN SCALES - GENERAL: PAINLEVEL: NO PAIN (0)

## 2018-10-30 NOTE — PROGRESS NOTES
CHRONIC TRANSPLANT NEPHROLOGY VISIT    Assessment & Plan      # LDKT: baseline Cr ~ 1.3- 1.6; Stable   - Proteinuria: Minimal- 1.04 g/g in 4/2018. On sirolimus.    - Date of DSA last checked: 4/2018 Latest DSA: No   - BK Viremia: Not checked recently   - Kidney Tx Biopsy: No    # Immunosuppression: Sirolimus (goal  3-5) and Prednisone (dose  5 mg daily)   - Changes: No    # Hypertension: Borderline control at home.; Goal BP: < 130/80. On losartan 12.5 mg daily, metoprolol 100 mg BID, nifedipine 90 mg daily   - Changes: Yes - increased losartan 25mg daily- asked her to bring her home BP machine to a physician clinic to have it caliberated.     # Anemia in chronic renal disease: Hgb: Stable   - Iron studies: Not checked recently- hgb normal.     # Mineral Bone Disorder:    - Secondary renal hyperparathyroidism; PTH level is: Not checked recently- was 75 in 2009. Would hold off as Renal function normal.   - Vitamin D; level is: Not checked recently  - Calcium; level is: Normal- on 1 tab BID  - Phosphorus; level is: Not checked recently     # Electrolytes:   - Potassium; level: Normal  - Magnesium; level: Not checked recently  - Bicarbonate; level: Normal    # PAD: On plavix and pletal.     # Dyslipidemia: managed my cardiology. On atorvastatin 20 mg daily.    # EBV viremia: decreased during last check in 8/2018. Already on low IS. q3 month checks.     # Low BMI: patient not interested in seeing a dietician.  Discussed increased calories in diet.    # h/o Anal Cancer: seen by colorectal surgery in 9/2018/ Anoscopy q4 months. Also follows with oncology.    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 Follows with gynecology.     # NSAID use: prescribed but patient does not take it and knows to avoid NSAIDS.     # Immunization: will give flu shot this clinic visit.    # Skin Cancer:   - New lesions: one s/p excision   - Discussed sun protection and recommend regular follow up with Dermatology.    #  Medical Compliance: Yes    Return visit: Return in about 6 months (around 4/30/2019).    # Transplant History:  Etiology of kidney failure: Alport disease  Tx: LDKT  Transplant: 9/20/2004 (Kidney)  Donor Type: Living Donor Class: Standard Criteria Donor  History of BK viremia: No  History of CMV viremia: No  History of EBV viremia: Yes  Significant changes in immunosuppression: None  Significant transplant-related complications: EBV viremia    Transplant Office Phone Number: [phone]    Assessment and plan was discussed with the patient and she voiced her understanding and agreement.    Sherry Arriaga MD   Attestation:  This patient has been seen and evaluated by me, Hitesh See MD.  I have reviewed the note and agree with plan of care as documented by the fellow.       Chief Complaint   Ms. Yang is a 67 year old here for routine follow up.    History of Present Illness      Maribel Yang is a 67 year old female with ESKD from [facility] and is status post LDKT on 9/20/04.    She was last seen in clinic on 4/24/2018. She has been following with colorectal surgery and gyn for her anal cancer and YUNIOR. She has no complaints today. Appetite and weight has been stable although has 3 lbs weight loss from previous visit. No nausea, vomiting. Has chronic diarrhea- twice a week - controlled with probiotics.   No fever, sweats or chills.  No leg swelling.  No pain or burning with urination.      Recent Hospitalizations:  [x] No [] Yes    New Medical Issues: [x] No [] Yes    Decreased energy: [] No [x] Yes    Chest pain or SOB with exertion:  [x] No [] Yes    Appetite change or weight change: [x] No [] Yes    Nausea, vomiting or diarrhea:  [x] No [] Yes    Fever, sweats or chills: [x] No [] Yes    Leg swelling: [x] No [] Yes      Other medical issues:  No    Home BP: 130/70s    Review of Systems   A comprehensive review of systems was obtained and negative, except as noted in the HPI or PMH.    Problem List    Patient Active Problem List   Diagnosis     Other specified congenital anomalies     Kidney replaced by transplant     S/P LEEP of cervix     CARDIOVASCULAR SCREENING; LDL GOAL LESS THAN 100     Immunosuppressed status (H)     Hypertension secondary to other renal disorders     History of basal cell carcinoma     YUNIOR III (vulvar intraepithelial neoplasia III)     Peripheral artery disease (H)     Squamous cell lung cancer (H)     Lumbago     Vaginal dysplasia     Alopecia     Cherry angioma     Skin cancer screening     Intertrigo     History of basal cell carcinoma     Malignant neoplasm of anal canal (H)     Aftercare following organ transplant       Social History   Social History   Substance Use Topics     Smoking status: Former Smoker     Packs/day: 0.30     Years: 35.00     Types: Cigarettes     Quit date: 1/9/2014     Smokeless tobacco: Never Used      Comment: occ cig     Alcohol use 0.0 oz/week     0 Standard drinks or equivalent per week      Comment: rare       Allergies   Allergies   Allergen Reactions     Ultracet Nausea and Vomiting and Hives     Fentanyl Nausea     Has had since she initially had the issues with nausea and tolerated it OK.      Hydrocodone Nausea and Vomiting and Hives       Medications   Current Outpatient Prescriptions   Medication Sig     atorvastatin (LIPITOR) 20 MG tablet Take 1 tablet (20 mg) by mouth daily (Patient taking differently: Take 20 mg by mouth every evening )     calcium carbonate 600 mg-vitamin D 400 units (CALTRATE) 600-400 MG-UNIT per tablet Take 1 tablet by mouth 2 times daily     cilostazol (PLETAL) 100 MG tablet Take 1 tablet (100 mg) by mouth 2 times daily     clopidogrel (PLAVIX) 75 MG tablet Take 1 tablet (75 mg) by mouth daily     Lactobacillus-Inulin (CULTURELLE DIGESTIVE HEALTH) CAPS TAKE ONE CAPSULE BY MOUTH EVERY DAY     Lactobacillus-Inulin (CULTURELLE DIGESTIVE HEALTH) CAPS TAKE ONE CAPSULE BY MOUTH EVERY DAY     losartan (COZAAR) 25 MG tablet TAKE  ONE-HALF TABLET (12.5MG) BY MOUTH EVERY DAY     metoprolol tartrate (LOPRESSOR) 100 MG tablet Take 1 tablet (100 mg) by mouth 2 times daily     NIFEdipine ER osmotic (PROCARDIA XL) 90 MG 24 hr tablet Take 1 tablet (90 mg) by mouth daily     ORDER FOR DME Equipment being ordered: TENS     predniSONE (DELTASONE) 5 MG tablet Take 1 tablet (5 mg) by mouth daily     sirolimus (GENERIC EQUIVALENT) 1 MG tablet Take 2 tablets (2 mg) by mouth daily     ACETAMINOPHEN PO Take 1-2 tablets by mouth every 8 hours as needed for pain     acetaminophen-codeine (TYLENOL WITH CODEINE #3) 300-30 MG per tablet Take 1-2 tablets by mouth every 6 hours as needed for pain (Patient taking differently: Take 1-2 tablets by mouth every 6 hours as needed for pain (migraines) )     amoxicillin (AMOXIL) 500 MG capsule Take 4 capsules (2,000 mg) by mouth once as needed Prior to dental procedures     ibuprofen (ADVIL/MOTRIN) 600 MG tablet Take 1 tablet (600 mg) by mouth every 6 hours as needed for pain (mild) (Patient not taking: Reported on 10/30/2018)     No current facility-administered medications for this visit.      There are no discontinued medications.    Physical Exam   Vital Signs: /75 (BP Location: Right arm)  Pulse 63  Temp 98.5  F (36.9  C) (Oral)  Wt 45.3 kg (99 lb 12.8 oz)  SpO2 93%  BMI 18.25 kg/m2    GENERAL APPEARANCE: alert and no distress. Alopecia. Cachexia.   HENT: mouth without ulcers or lesions  LYMPHATICS: no cervical or supraclavicular nodes  RESP: lungs clear to auscultation - no rales, rhonchi or wheezes  CV: regular rhythm, normal rate, no rub, no murmur  EDEMA: no LE edema bilaterally  ABDOMEN: soft, nondistended, nontender, bowel sounds normal  MS: extremities normal - no gross deformities noted, no evidence of inflammation in joints, no muscle tenderness  SKIN: no rash  TX KIDNEY: normal  DIALYSIS ACCESS:  None      Data     Renal Latest Ref Rng & Units 10/30/2018 8/10/2018 7/13/2018   Na 133 - 144 mmol/L  137 138 138   K 3.4 - 5.3 mmol/L 3.8 3.7 3.9   Cl 94 - 109 mmol/L 104 108 106   CO2 20 - 32 mmol/L 26 24 27   BUN 7 - 30 mg/dL 17 18 26   Cr 0.52 - 1.04 mg/dL 1.38(H) 1.24(H) 1.29(H)   Glucose 70 - 99 mg/dL 115(H) 108(H) 95   Ca  8.5 - 10.1 mg/dL 9.4 9.2 9.1   Mg 1.6 - 2.3 mg/dL - - -     Bone Health Latest Ref Rng & Units 6/1/2009 7/24/2008 1/29/2008   Phos 2.5 - 4.5 mg/dL 3.4 - -   PTHi 12 - 72 pg/mL 75(H) 81(H) 73(H)     Heme Latest Ref Rng & Units 10/30/2018 8/10/2018 7/13/2018   WBC 4.0 - 11.0 10e9/L 6.5 6.2 6.3   Hgb 11.7 - 15.7 g/dL 14.1 13.3 13.2   Plt 150 - 450 10e9/L 249 222 228     Liver Latest Ref Rng & Units 10/30/2018 8/10/2018 7/13/2018   AP 40 - 150 U/L 123 102 96   TBili 0.2 - 1.3 mg/dL 0.3 0.3 0.2   ALT 0 - 50 U/L 16 18 18   AST 0 - 45 U/L 19 14 13   Tot Protein 6.8 - 8.8 g/dL 7.8 7.4 7.6   Albumin 3.4 - 5.0 g/dL 3.3(L) 3.2(L) 3.4     Pancreas Latest Ref Rng & Units 10/23/2009   Lipase 20 - 250 U/L 56     Iron studies Latest Ref Rng & Units 8/22/2008 3/8/2007 7/6/2004   Iron 35 - 180 ug/dL 68 106 -   Ferritin 10 - 300 ng/mL 142 127 39     [facility] Txp Virology Latest Ref Rng & Units 4/24/2018 12/15/2009 10/2/2009   CVM DNA Quant - Plasma - -   BK Spec - - Plasma, EDTA anticoagulant Plasma, EDTA anticoagulant   BK Res <1000 copies/mL - <1000 <1000   BK Log <3.0 Log copies/mL - <3.0 <3.0            Recent Labs   Lab Test  04/11/13   0859  04/24/13   1003  04/23/18   0905   DOSMPA  4/10/13     2100  Not Provided  Not Provided   MPACID  5.26*  2.96  0.60*   MPAG  67.3  86.6  28.8*

## 2018-10-30 NOTE — NURSING NOTE
Chief Complaint   Patient presents with     RECHECK     Follow Up Kidney Tx 2004     /75 (BP Location: Right arm)  Pulse 63  Temp 98.5  F (36.9  C) (Oral)  Wt 45.3 kg (99 lb 12.8 oz)  SpO2 93%  BMI 18.25 kg/m2   Julissa Barkley

## 2018-10-30 NOTE — LETTER
10/30/2018      RE: Maribel Yang  [address]  [facility] [address]       CHRONIC TRANSPLANT NEPHROLOGY VISIT    Assessment & Plan      # LDKT: baseline Cr ~ 1.3- 1.6; Stable   - Proteinuria: Minimal- 1.04 g/g in 4/2018. On sirolimus.    - Date of DSA last checked: 4/2018 Latest DSA: No   - BK Viremia: Not checked recently   - Kidney Tx Biopsy: No    # Immunosuppression: Sirolimus (goal  3-5) and Prednisone (dose  5 mg daily)   - Changes: No    # Hypertension: Borderline control at home.; Goal BP: < 130/80. On losartan 12.5 mg daily, metoprolol 100 mg BID, nifedipine 90 mg daily   - Changes: Yes - increased losartan 25mg daily- asked her to bring her home BP machine to a physician clinic to have it caliberated.     # Anemia in chronic renal disease: Hgb: Stable   - Iron studies: Not checked recently- hgb normal.     # Mineral Bone Disorder:    - Secondary renal hyperparathyroidism; PTH level is: Not checked recently- was 75 in 2009. Would hold off as Renal function normal.   - Vitamin D; level is: Not checked recently  - Calcium; level is: Normal- on 1 tab BID  - Phosphorus; level is: Not checked recently     # Electrolytes:   - Potassium; level: Normal  - Magnesium; level: Not checked recently  - Bicarbonate; level: Normal    # PAD: On plavix and pletal.     # Dyslipidemia: managed my cardiology. On atorvastatin 20 mg daily.    # EBV viremia: decreased during last check in 8/2018. Already on low IS. q3 month checks.     # Low BMI: patient not interested in seeing a dietician.  Discussed increased calories in diet.    # h/o Anal Cancer: seen by colorectal surgery in 9/2018/ Anoscopy q4 months. Also follows with oncology.    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 Follows with gynecology.     # NSAID use: prescribed but patient does not take it and knows to avoid NSAIDS.     # Immunization: will give flu shot this clinic visit.    # Skin Cancer:   - New lesions: one s/p  excision   - Discussed sun protection and recommend regular follow up with Dermatology.    # Medical Compliance: Yes    Return visit: Return in about 6 months (around 4/30/2019).    # Transplant History:  Etiology of kidney failure: Alport disease  Tx: LDKT  Transplant: 9/20/2004 (Kidney)  Donor Type: Living Donor Class: Standard Criteria Donor  History of BK viremia: No  History of CMV viremia: No  History of EBV viremia: Yes  Significant changes in immunosuppression: None  Significant transplant-related complications: EBV viremia    Transplant Office Phone Number: [phone]    Assessment and plan was discussed with the patient and she voiced her understanding and agreement.    Sherry Arriaga MD   Attestation:  This patient has been seen and evaluated by me, Hitesh See MD.  I have reviewed the note and agree with plan of care as documented by the fellow.       Chief Complaint   Ms. Yang is a 67 year old here for routine follow up.    History of Present Illness      Maribel Yang is a 67 year old female with ESKD from [facility] and is status post LDKT on 9/20/04.    She was last seen in clinic on 4/24/2018. She has been following with colorectal surgery and gyn for her anal cancer and YUNIOR. She has no complaints today. Appetite and weight has been stable although has 3 lbs weight loss from previous visit. No nausea, vomiting. Has chronic diarrhea- twice a week - controlled with probiotics.   No fever, sweats or chills.  No leg swelling.  No pain or burning with urination.      Recent Hospitalizations:  [x] No [] Yes    New Medical Issues: [x] No [] Yes    Decreased energy: [] No [x] Yes    Chest pain or SOB with exertion:  [x] No [] Yes    Appetite change or weight change: [x] No [] Yes    Nausea, vomiting or diarrhea:  [x] No [] Yes    Fever, sweats or chills: [x] No [] Yes    Leg swelling: [x] No [] Yes      Other medical issues:  No    Home BP: 130/70s    Review of Systems   A comprehensive review of  systems was obtained and negative, except as noted in the HPI or PMH.    Problem List   Patient Active Problem List   Diagnosis     Other specified congenital anomalies     Kidney replaced by transplant     S/P LEEP of cervix     CARDIOVASCULAR SCREENING; LDL GOAL LESS THAN 100     Immunosuppressed status (H)     Hypertension secondary to other renal disorders     History of basal cell carcinoma     YUNIOR III (vulvar intraepithelial neoplasia III)     Peripheral artery disease (H)     Squamous cell lung cancer (H)     Lumbago     Vaginal dysplasia     Alopecia     Cherry angioma     Skin cancer screening     Intertrigo     History of basal cell carcinoma     Malignant neoplasm of anal canal (H)     Aftercare following organ transplant       Social History   Social History   Substance Use Topics     Smoking status: Former Smoker     Packs/day: 0.30     Years: 35.00     Types: Cigarettes     Quit date: 1/9/2014     Smokeless tobacco: Never Used      Comment: occ cig     Alcohol use 0.0 oz/week     0 Standard drinks or equivalent per week      Comment: rare       Allergies   Allergies   Allergen Reactions     Ultracet Nausea and Vomiting and Hives     Fentanyl Nausea     Has had since she initially had the issues with nausea and tolerated it OK.      Hydrocodone Nausea and Vomiting and Hives       Medications   Current Outpatient Prescriptions   Medication Sig     atorvastatin (LIPITOR) 20 MG tablet Take 1 tablet (20 mg) by mouth daily (Patient taking differently: Take 20 mg by mouth every evening )     calcium carbonate 600 mg-vitamin D 400 units (CALTRATE) 600-400 MG-UNIT per tablet Take 1 tablet by mouth 2 times daily     cilostazol (PLETAL) 100 MG tablet Take 1 tablet (100 mg) by mouth 2 times daily     clopidogrel (PLAVIX) 75 MG tablet Take 1 tablet (75 mg) by mouth daily     Lactobacillus-Inulin ([employer]) CAPS TAKE ONE CAPSULE BY MOUTH EVERY DAY     Lactobacillus-Inulin (CULTURELLE DIGESTIVE  HEALTH) CAPS TAKE ONE CAPSULE BY MOUTH EVERY DAY     losartan (COZAAR) 25 MG tablet TAKE ONE-HALF TABLET (12.5MG) BY MOUTH EVERY DAY     metoprolol tartrate (LOPRESSOR) 100 MG tablet Take 1 tablet (100 mg) by mouth 2 times daily     NIFEdipine ER osmotic (PROCARDIA XL) 90 MG 24 hr tablet Take 1 tablet (90 mg) by mouth daily     ORDER FOR DME Equipment being ordered: TENS     predniSONE (DELTASONE) 5 MG tablet Take 1 tablet (5 mg) by mouth daily     sirolimus (GENERIC EQUIVALENT) 1 MG tablet Take 2 tablets (2 mg) by mouth daily     ACETAMINOPHEN PO Take 1-2 tablets by mouth every 8 hours as needed for pain     acetaminophen-codeine (TYLENOL WITH CODEINE #3) 300-30 MG per tablet Take 1-2 tablets by mouth every 6 hours as needed for pain (Patient taking differently: Take 1-2 tablets by mouth every 6 hours as needed for pain (migraines) )     amoxicillin (AMOXIL) 500 MG capsule Take 4 capsules (2,000 mg) by mouth once as needed Prior to dental procedures     ibuprofen (ADVIL/MOTRIN) 600 MG tablet Take 1 tablet (600 mg) by mouth every 6 hours as needed for pain (mild) (Patient not taking: Reported on 10/30/2018)     No current facility-administered medications for this visit.      There are no discontinued medications.    Physical Exam   Vital Signs: /75 (BP Location: Right arm)  Pulse 63  Temp 98.5  F (36.9  C) (Oral)  Wt 45.3 kg (99 lb 12.8 oz)  SpO2 93%  BMI 18.25 kg/m2    GENERAL APPEARANCE: alert and no distress. Alopecia. Cachexia.   HENT: mouth without ulcers or lesions  LYMPHATICS: no cervical or supraclavicular nodes  RESP: lungs clear to auscultation - no rales, rhonchi or wheezes  CV: regular rhythm, normal rate, no rub, no murmur  EDEMA: no LE edema bilaterally  ABDOMEN: soft, nondistended, nontender, bowel sounds normal  MS: extremities normal - no gross deformities noted, no evidence of inflammation in joints, no muscle tenderness  SKIN: no rash  TX KIDNEY: normal  DIALYSIS ACCESS:   None      Data     Renal Latest Ref Rng & Units 10/30/2018 8/10/2018 7/13/2018   Na 133 - 144 mmol/L 137 138 138   K 3.4 - 5.3 mmol/L 3.8 3.7 3.9   Cl 94 - 109 mmol/L 104 108 106   CO2 20 - 32 mmol/L 26 24 27   BUN 7 - 30 mg/dL 17 18 26   Cr 0.52 - 1.04 mg/dL 1.38(H) 1.24(H) 1.29(H)   Glucose 70 - 99 mg/dL 115(H) 108(H) 95   Ca  8.5 - 10.1 mg/dL 9.4 9.2 9.1   Mg 1.6 - 2.3 mg/dL - - -     Bone Health Latest Ref Rng & Units 6/1/2009 7/24/2008 1/29/2008   Phos 2.5 - 4.5 mg/dL 3.4 - -   PTHi 12 - 72 pg/mL 75(H) 81(H) 73(H)     Heme Latest Ref Rng & Units 10/30/2018 8/10/2018 7/13/2018   WBC 4.0 - 11.0 10e9/L 6.5 6.2 6.3   Hgb 11.7 - 15.7 g/dL 14.1 13.3 13.2   Plt 150 - 450 10e9/L 249 222 228     Liver Latest Ref Rng & Units 10/30/2018 8/10/2018 7/13/2018   AP 40 - 150 U/L 123 102 96   TBili 0.2 - 1.3 mg/dL 0.3 0.3 0.2   ALT 0 - 50 U/L 16 18 18   AST 0 - 45 U/L 19 14 13   Tot Protein 6.8 - 8.8 g/dL 7.8 7.4 7.6   Albumin 3.4 - 5.0 g/dL 3.3(L) 3.2(L) 3.4     Pancreas Latest Ref Rng & Units 10/23/2009   Lipase 20 - 250 U/L 56     Iron studies Latest Ref Rng & Units 8/22/2008 3/8/2007 7/6/2004   Iron 35 - 180 ug/dL 68 106 -   Ferritin 10 - 300 ng/mL 142 127 39     [facility] Txp Virology Latest Ref Rng & Units 4/24/2018 12/15/2009 10/2/2009   CVM DNA Quant - Plasma - -   BK Spec - - Plasma, EDTA anticoagulant Plasma, EDTA anticoagulant   BK Res <1000 copies/mL - <1000 <1000   BK Log <3.0 Log copies/mL - <3.0 <3.0            Recent Labs   Lab Test  04/11/13   0859  04/24/13   1003  04/23/18   0905   DOSMPA  4/10/13     2100  Not Provided  Not Provided   MPACID  5.26*  2.96  0.60*   MPAG  67.3  86.6  28.8*       Hitesh See MD
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
## 2018-10-31 ENCOUNTER — TELEPHONE (OUTPATIENT)
Dept: TRANSPLANT | Facility: CLINIC | Age: 68
End: 2018-10-31

## 2018-10-31 DIAGNOSIS — Z94.0 KIDNEY REPLACED BY TRANSPLANT: Primary | ICD-10-CM

## 2018-10-31 LAB
EBV DNA # SPEC NAA+PROBE: 2938 {COPIES}/ML
EBV DNA SPEC NAA+PROBE-LOG#: 3.5 {LOG_COPIES}/ML

## 2018-10-31 NOTE — TELEPHONE ENCOUNTER
ISSUE:  Sirolimus level 12 (s/b 3-5)    PLAN:  Call placed to pt. She confirms that she took the medication the morning of blood draw. I asked that she repeat level when able, emphasizing 24 hour trough. Order placed.  Pt verbalizes understanding of plan

## 2018-11-10 ENCOUNTER — HEALTH MAINTENANCE LETTER (OUTPATIENT)
Age: 68
End: 2018-11-10

## 2018-12-05 ENCOUNTER — PATIENT OUTREACH (OUTPATIENT)
Dept: CARE COORDINATION | Facility: CLINIC | Age: 68
End: 2018-12-05

## 2018-12-12 ENCOUNTER — OFFICE VISIT (OUTPATIENT)
Dept: DERMATOLOGY | Facility: CLINIC | Age: 68
End: 2018-12-12
Payer: COMMERCIAL

## 2018-12-12 DIAGNOSIS — D18.01 CHERRY ANGIOMA: ICD-10-CM

## 2018-12-12 DIAGNOSIS — Z85.828 HISTORY OF BASAL CELL CARCINOMA: ICD-10-CM

## 2018-12-12 DIAGNOSIS — D22.9 MULTIPLE BENIGN NEVI: ICD-10-CM

## 2018-12-12 DIAGNOSIS — L82.1 SEBORRHEIC KERATOSIS: ICD-10-CM

## 2018-12-12 DIAGNOSIS — Z12.83 SKIN CANCER SCREENING: Primary | ICD-10-CM

## 2018-12-12 ASSESSMENT — PAIN SCALES - GENERAL: PAINLEVEL: NO PAIN (0)

## 2018-12-12 NOTE — LETTER
12/12/2018       RE: Maribel Yang  [address]  [facility] [address]     Dear Colleague,    Thank you for referring your patient, Maribel Yang, to the [facility] DERMATOLOGY at [facility]. Please see a copy of my visit note below.        [facility] Dermatology Note      Dermatology Problem List:  1. Hx ESKD s/p rental transplant 2004  2. Hx NMSC  - BCC right lower leg.   - BCC left lower leg, treated with ED &C 7/22/2016  - SCC, left mid thigh- s/p ED & C 09/12/18  3. Skin cancer screening, 12/12/18    CC:   Chief Complaint   Patient presents with     Skin Check     Skin check, no concerns noted. Personal hx of SCC.     Encounter Date: Dec 12, 2018    History of Present Illness:  Ms. Maribel Yang is a 67 year old female, with a history of kidney transplantation and NMSC, who presents today for a skin check. The patient was last seen in the dermatology clinic on 09/12/18 during which she had ED&C performed on a SCC in situ on her left mid thigh.     Today she reports that the site treated with ED&C at her last visit has since healed well. She denies any additional  painful, bleeding, nonhealing or pruritic lesions and denies any new or changing moles.    Past Medical History:   Patient Active Problem List   Diagnosis     Other specified congenital anomalies     Kidney replaced by transplant     S/P LEEP of cervix     CARDIOVASCULAR SCREENING; LDL GOAL LESS THAN 100     Immunosuppression (H)     HTN, kidney transplant related     History of basal cell carcinoma     YUNIOR III (vulvar intraepithelial neoplasia III)     Peripheral artery disease (H)     Squamous cell lung cancer (H)     Lumbago     Vaginal dysplasia     Alopecia     Cherry angioma     Skin cancer screening     Intertrigo     History of basal cell carcinoma     Malignant neoplasm of anal canal (H)     Aftercare following organ transplant     Past Medical History:    Diagnosis Date     Abnormal coagulation profile     p [id number] heterozygote      Anemia      Antiplatelet or antithrombotic long-term use      ASCUS with positive high risk HPV 2007, 2015    + HPV 56, 54,& 6, colp - TAL III, Leep =TAL II     Basal cell carcinoma      Hypertension      Immunosuppressed status (H)     due meds     Kidney replaced by transplant 9/04    Living donor recipient,  Rejection 7/2005     LSIL (low grade squamous intraepithelial lesion) on Pap smear 4/2013    +HPV 33 or 45, 61       PAD (peripheral artery disease) (H)      PONV (postoperative nausea and vomiting)      Squamous cell lung cancer (H)      Thrombosis of leg 1967     Unspecified disorder of kidney and ureter     X-linked dominant Alport's syndrome.     Past Surgical History:   Procedure Laterality Date     BIOPSY ANAL N/A 3/14/2018    Procedure: BIOPSY ANAL;;  Surgeon: Shabbir Leo MD;  Location: [facility] OR     C NONSPECIFIC PROCEDURE      Thrombectomy     C NONSPECIFIC PROCEDURE  1955 and 1959    Bilater eye surgery - correction for crossed eyes     C NONSPECIFIC PROCEDURE  1998    oopherectomy L     C NONSPECIFIC PROCEDURE  1967    open kidney biopsy - L     C TRANSPLANTATION OF KIDNEY  9/04    recipient -- done at [facility]     COLONOSCOPY       COLONOSCOPY N/A 8/9/2017    Procedure: COMBINED COLONOSCOPY, SINGLE OR MULTIPLE BIOPSY/POLYPECTOMY BY BIOPSY;;  Surgeon: Sushil Hyatt MD;  Location:  GI     COLPOSCOPY,LOOP ELECTRD CERVIX EXCIS  03/11/08    TAL II     CONIZATION LEEP  7/17/2013    Procedure: CONIZATION LEEP;;  Surgeon: Liliana Renteria MD;  Location: [facility] OR     CONIZATION LEEP N/A 8/17/2016    Procedure: CONIZATION LEEP;  Surgeon: Liliana Renteria MD;  Location: [facility] OR     EXAM UNDER ANESTHESIA ANUS  7/15/2014    Procedure: EXAM UNDER ANESTHESIA ANUS;  Surgeon: Radha Musa MD;  Location: [facility] OR     EXAM UNDER ANESTHESIA ANUS N/A 3/14/2018    Procedure: EXAM UNDER ANESTHESIA ANUS;  Anal Exam  Under Anesthesia With Excision of anal lesion, proctoscopy;  Surgeon: Shabbir Leo MD;  Location: [facility] OR     EYE SURGERY       LASER CO2 EXCISE VULVA WIDE LOCAL  7/15/2014    Procedure: LASER CO2 EXCISE VULVA WIDE LOCAL;  Surgeon: Liliana Renteria MD;  Location: [facility] OR     LASER CO2 VAGINA  7/17/2013    Procedure: LASER CO2 VAGINA;;  Surgeon: Liliana Renteria MD;  Location: [facility] OR     LASER CO2 VAGINA N/A 9/25/2018    Procedure: LASER CO2 VAGINA;  Exam Under Anesthesia, CO2 Laser Ablation of Upper Vagina and Cervix;  Surgeon: Pati Garcia MD;  Location: [facility] OR     MICROSCOPY ANAL  7/17/2013    Procedure: MICROSCOPY ANAL;  Anal Microscopy,  EUA vagina,Colposcopy Of Vagina And Vulva, Vaginal Biopsies, Omniguide Co2 Laser To Vagina and vulva, Loop Electrosurgical Excision Procedure To Cervix;  Surgeon: Radha Musa MD;  Location: [facility] OR     MICROSCOPY ANAL  7/15/2014    Procedure: MICROSCOPY ANAL;  Surgeon: Radha Musa MD;  Location: [facility] OR       Social History:  The patient does not work, looking for a job. The patient denies use of tanning beds.    Medications:  Current Outpatient Medications   Medication Sig Dispense Refill     ACETAMINOPHEN PO Take 1-2 tablets by mouth every 8 hours as needed for pain       acetaminophen-codeine (TYLENOL WITH CODEINE #3) 300-30 MG per tablet Take 1-2 tablets by mouth every 6 hours as needed for pain (Patient taking differently: Take 1-2 tablets by mouth every 6 hours as needed for pain (migraines) ) 50 tablet 0     amoxicillin (AMOXIL) 500 MG capsule Take 4 capsules (2,000 mg) by mouth once as needed Prior to dental procedures 16 capsule 3     atorvastatin (LIPITOR) 20 MG tablet Take 1 tablet (20 mg) by mouth daily (Patient taking differently: Take 20 mg by mouth every evening ) 30 tablet 11     calcium carbonate 600 mg-vitamin D 400 units (CALTRATE) 600-400 MG-UNIT per tablet Take 1 tablet by mouth 2 times daily       cilostazol (PLETAL) 100 MG  tablet Take 1 tablet (100 mg) by mouth 2 times daily 60 tablet 11     clopidogrel (PLAVIX) 75 MG tablet Take 1 tablet (75 mg) by mouth daily 30 tablet 11     ibuprofen (ADVIL/MOTRIN) 600 MG tablet Take 1 tablet (600 mg) by mouth every 6 hours as needed for pain (mild) (Patient not taking: Reported on 10/30/2018) 30 tablet 0     Lactobacillus-Inulin (sCoolTVE DIGESTIVE [facility]) CAPS TAKE ONE CAPSULE BY MOUTH EVERY DAY 90 capsule 0     Lactobacillus-Inulin (sCoolTVE DIGESTIVE HEALTH) CAPS TAKE ONE CAPSULE BY MOUTH EVERY  capsule 0     losartan (COZAAR) 25 MG tablet Take 1 tablet (25 mg) by mouth daily 90 tablet 5     metoprolol tartrate (LOPRESSOR) 100 MG tablet Take 1 tablet (100 mg) by mouth 2 times daily 60 tablet 5     NIFEdipine ER osmotic (PROCARDIA XL) 90 MG 24 hr tablet Take 1 tablet (90 mg) by mouth daily 30 tablet 5     ORDER FOR DME Equipment being ordered: TENS 1 Device 0     predniSONE (DELTASONE) 5 MG tablet Take 1 tablet (5 mg) by mouth daily 90 tablet 3     sirolimus (GENERIC EQUIVALENT) 1 MG tablet Take 2 tablets (2 mg) by mouth daily 180 tablet 3     Allergies   Allergen Reactions     Ultracet Nausea and Vomiting and Hives     Fentanyl Nausea     Has had since she initially had the issues with nausea and tolerated it OK.      Hydrocodone Nausea and Vomiting and Hives       Review of Systems:  -Skin Establ Pt: The patient denies any new rash, pruritus, or lesions that are symptomatic, changing or bleeding, except as per HPI.  -Constitutional: The patient denies fatigue, fevers, chills, unintended weight loss, and night sweats. She is feeling generally well.    Physical exam:  Vitals: There were no vitals taken for this visit.  GEN: This is a well developed, well-nourished female in no acute distress, in a pleasant mood.    SKIN: Full skin, which includes the head/face, both arms, chest, back, abdomen,both legs, groin, buttocks, digits and/or nails, was examined. Significant for:   -There are  bright red some shaped papules scattered on the trunk.   -There is no erythema, telangectasias, nodularity, or pigmentation on the left thigh, right lower leg or left lower leg.  - There are scattered brown stuck on waxy papules on the trunk  -There are very few round brown symmetric macules and papules to the trunk and extremities, less than 25.  -No other lesions of concern on areas examined.     Impression/Plan:  1. Hx NMSC    No signs of recurrence upon physical examination today     Recommend regular skin cancer screenings     Recommend regular use of sun protection with emphasis on reapplication     ABCD's of melanoma were reviewed with patient and handout provided.      2. Seborrheic keratosis- non symptomatic     Reassured of benign nature. No further intervention required today.     3. Cherry angiomas, trunk    Reassured of benign, congenital nature   4. Few benign appearing nevi to the trunk and extremities, reassurance given. ABCDES reviewed.     Follow up in 1 year, earlier for any new or changing lesions     Staff Involved:  Scribe/Staff     Scribe Disclosure:   I, Leona Pedroza, am serving as a scribe to document services personally performed by Rosalie Pelletier PA-C, based on data collection and the provider's statements to me.    Provider Disclosure:   The documentation recorded by the scribe accurately reflects the services I personally performed and the decisions made by me.    All risks, benefits and alternatives were discussed with patient.  Patient is in agreement and understands the assessment and plan.  All questions were answered.  Sun Screen Education was given.   Return to Clinic annually or sooner as needed.   Rosalie Pelletier PA-C   [facility] Dermatology [facility]     Again, thank you for allowing me to participate in the care of your patient.      Sincerely,    Rosalie Pelletier PA-C

## 2018-12-12 NOTE — PROGRESS NOTES
[facility] Dermatology Note      Dermatology Problem List:  1. Hx ESKD s/p rental transplant 2004  2. Hx NMSC  - BCC right lower leg.   - BCC left lower leg, treated with ED &C 7/22/2016  - SCC, left mid thigh- s/p ED & C 09/12/18  3. Skin cancer screening, 12/12/18    CC:   Chief Complaint   Patient presents with     Skin Check     Skin check, no concerns noted. Personal hx of SCC.     Encounter Date: Dec 12, 2018    History of Present Illness:  Ms. Maribel Yang is a 67 year old female, with a history of kidney transplantation and NMSC, who presents today for a skin check. The patient was last seen in the dermatology clinic on 09/12/18 during which she had ED&C performed on a SCC in situ on her left mid thigh.     Today she reports that the site treated with ED&C at her last visit has since healed well. She denies any additional  painful, bleeding, nonhealing or pruritic lesions and denies any new or changing moles.    Past Medical History:   Patient Active Problem List   Diagnosis     Other specified congenital anomalies     Kidney replaced by transplant     S/P LEEP of cervix     CARDIOVASCULAR SCREENING; LDL GOAL LESS THAN 100     Immunosuppression (H)     HTN, kidney transplant related     History of basal cell carcinoma     YUNIOR III (vulvar intraepithelial neoplasia III)     Peripheral artery disease (H)     Squamous cell lung cancer (H)     Lumbago     Vaginal dysplasia     Alopecia     Cherry angioma     Skin cancer screening     Intertrigo     History of basal cell carcinoma     Malignant neoplasm of anal canal (H)     Aftercare following organ transplant     Past Medical History:   Diagnosis Date     Abnormal coagulation profile     p [id number] heterozygote      Anemia      Antiplatelet or antithrombotic long-term use      ASCUS with positive high risk HPV 2007, 2015    + HPV 56, 54,& 6, colp - TAL III, Leep =TAL II     Basal cell carcinoma      Hypertension      Immunosuppressed  status (H)     due meds     Kidney replaced by transplant 9/04    Living donor recipient,  Rejection 7/2005     LSIL (low grade squamous intraepithelial lesion) on Pap smear 4/2013    +HPV 33 or 45, 61       PAD (peripheral artery disease) (H)      PONV (postoperative nausea and vomiting)      Squamous cell lung cancer (H)      Thrombosis of leg 1967     Unspecified disorder of kidney and ureter     X-linked dominant Alport's syndrome.     Past Surgical History:   Procedure Laterality Date     BIOPSY ANAL N/A 3/14/2018    Procedure: BIOPSY ANAL;;  Surgeon: Shabbir Leo MD;  Location: [facility] OR     C NONSPECIFIC PROCEDURE      Thrombectomy     C NONSPECIFIC PROCEDURE  1955 and 1959    Bilater eye surgery - correction for crossed eyes     C NONSPECIFIC PROCEDURE  1998    oopherectomy L     C NONSPECIFIC PROCEDURE  1967    open kidney biopsy - L     C TRANSPLANTATION OF KIDNEY  9/04    recipient -- done at [facility]     COLONOSCOPY       COLONOSCOPY N/A 8/9/2017    Procedure: COMBINED COLONOSCOPY, SINGLE OR MULTIPLE BIOPSY/POLYPECTOMY BY BIOPSY;;  Surgeon: Sushil Hyatt MD;  Location:  GI     COLPOSCOPY,LOOP ELECTRD CERVIX EXCIS  03/11/08    TAL II     CONIZATION LEEP  7/17/2013    Procedure: CONIZATION LEEP;;  Surgeon: Liliana Renteria MD;  Location:  OR     CONIZATION LEEP N/A 8/17/2016    Procedure: CONIZATION LEEP;  Surgeon: Liliana Renteria MD;  [facility]     EXAM UNDER ANESTHESIA ANUS  7/15/2014    Procedure: EXAM UNDER ANESTHESIA ANUS;  Surgeon: Radha Musa MD;  Location: [facility] OR     EXAM UNDER ANESTHESIA ANUS N/A 3/14/2018    Procedure: EXAM UNDER ANESTHESIA ANUS;  Anal Exam Under Anesthesia With Excision of anal lesion, proctoscopy;  Surgeon: Shabbir Leo MD;  [facility]     EYE SURGERY       LASER CO2 EXCISE VULVA WIDE LOCAL  7/15/2014    Procedure: LASER CO2 EXCISE VULVA WIDE LOCAL;  Surgeon: Liliana Renteria MD;  Location:  OR     LASER CO2 VAGINA  7/17/2013     Procedure: LASER CO2 VAGINA;;  Surgeon: Liliana Renteria MD;  [facility]: [facility] OR     LASER CO2 VAGINA N/A 9/25/2018    Procedure: LASER CO2 VAGINA;  Exam Under Anesthesia, CO2 Laser Ablation of Upper Vagina and Cervix;  Surgeon: Pati Garcia MD;  Location: [facility] OR     MICROSCOPY ANAL  7/17/2013    Procedure: MICROSCOPY ANAL;  Anal Microscopy,  EUA vagina,Colposcopy Of Vagina And Vulva, Vaginal Biopsies, Omniguide Co2 Laser To Vagina and vulva, Loop Electrosurgical Excision Procedure To Cervix;  Surgeon: Radha Musa MD;  Location: [facility] OR     MICROSCOPY ANAL  7/15/2014    Procedure: MICROSCOPY ANAL;  Surgeon: Radha Musa MD;  Location: [facility] OR       Social History:  The patient does not work, looking for a job. The patient denies use of tanning beds.    Medications:  Current Outpatient Medications   Medication Sig Dispense Refill     ACETAMINOPHEN PO Take 1-2 tablets by mouth every 8 hours as needed for pain       acetaminophen-codeine (TYLENOL WITH CODEINE #3) 300-30 MG per tablet Take 1-2 tablets by mouth every 6 hours as needed for pain (Patient taking differently: Take 1-2 tablets by mouth every 6 hours as needed for pain (migraines) ) 50 tablet 0     amoxicillin (AMOXIL) 500 MG capsule Take 4 capsules (2,000 mg) by mouth once as needed Prior to dental procedures 16 capsule 3     atorvastatin (LIPITOR) 20 MG tablet Take 1 tablet (20 mg) by mouth daily (Patient taking differently: Take 20 mg by mouth every evening ) 30 tablet 11     calcium carbonate 600 mg-vitamin D 400 units (CALTRATE) 600-400 MG-UNIT per tablet Take 1 tablet by mouth 2 times daily       cilostazol (PLETAL) 100 MG tablet Take 1 tablet (100 mg) by mouth 2 times daily 60 tablet 11     clopidogrel (PLAVIX) 75 MG tablet Take 1 tablet (75 mg) by mouth daily 30 tablet 11     ibuprofen (ADVIL/MOTRIN) 600 MG tablet Take 1 tablet (600 mg) by mouth every 6 hours as needed for pain (mild) (Patient not taking: Reported on  10/30/2018) 30 tablet 0     Lactobacillus-Inulin ([facility] DIGESTIVE [facility]) CAPS TAKE ONE CAPSULE BY MOUTH EVERY DAY 90 capsule 0     Lactobacillus-Inulin ([facility]) CAPS TAKE ONE CAPSULE BY MOUTH EVERY  capsule 0     losartan (COZAAR) 25 MG tablet Take 1 tablet (25 mg) by mouth daily 90 tablet 5     metoprolol tartrate (LOPRESSOR) 100 MG tablet Take 1 tablet (100 mg) by mouth 2 times daily 60 tablet 5     NIFEdipine ER osmotic (PROCARDIA XL) 90 MG 24 hr tablet Take 1 tablet (90 mg) by mouth daily 30 tablet 5     ORDER FOR DME Equipment being ordered: TENS 1 Device 0     predniSONE (DELTASONE) 5 MG tablet Take 1 tablet (5 mg) by mouth daily 90 tablet 3     sirolimus (GENERIC EQUIVALENT) 1 MG tablet Take 2 tablets (2 mg) by mouth daily 180 tablet 3     Allergies   Allergen Reactions     Ultracet Nausea and Vomiting and Hives     Fentanyl Nausea     Has had since she initially had the issues with nausea and tolerated it OK.      Hydrocodone Nausea and Vomiting and Hives       Review of Systems:  -Skin Establ Pt: The patient denies any new rash, pruritus, or lesions that are symptomatic, changing or bleeding, except as per HPI.  -Constitutional: The patient denies fatigue, fevers, chills, unintended weight loss, and night sweats. She is feeling generally well.    Physical exam:  Vitals: There were no vitals taken for this visit.  GEN: This is a well developed, well-nourished female in no acute distress, in a pleasant mood.    SKIN: Full skin, which includes the head/face, both arms, chest, back, abdomen,both legs, groin, buttocks, digits and/or nails, was examined. Significant for:   -There are bright red some shaped papules scattered on the trunk.   -There is no erythema, telangectasias, nodularity, or pigmentation on the left thigh, right lower leg or left lower leg.  - There are scattered brown stuck on waxy papules on the trunk  -There are very few round brown symmetric macules and  papules to the trunk and extremities, less than 25.  -No other lesions of concern on areas examined.     Impression/Plan:  1. Hx NMSC    No signs of recurrence upon physical examination today     Recommend regular skin cancer screenings     Recommend regular use of sun protection with emphasis on reapplication     ABCD's of melanoma were reviewed with patient and handout provided.      2. Seborrheic keratosis- non symptomatic     Reassured of benign nature. No further intervention required today.     3. Cherry angiomas, trunk    Reassured of benign, congenital nature   4. Few benign appearing nevi to the trunk and extremities, reassurance given. ABCDES reviewed.     Follow up in 1 year, earlier for any new or changing lesions     Staff Involved:  Scribe/Staff     Scribe Disclosure:   I, Leona Pedroza, am serving as a scribe to document services personally performed by Rosalie Pelletier PA-C, based on data collection and the provider's statements to me.    Provider Disclosure:   The documentation recorded by the scribe accurately reflects the services I personally performed and the decisions made by me.    All risks, benefits and alternatives were discussed with patient.  Patient is in agreement and understands the assessment and plan.  All questions were answered.  Sun Screen Education was given.   Return to Clinic annually or sooner as needed.   Rosalie Pelletier PA-C   [facility]

## 2019-01-16 ENCOUNTER — TELEPHONE (OUTPATIENT)
Dept: GASTROENTEROLOGY | Facility: CLINIC | Age: 69
End: 2019-01-16

## 2019-01-18 NOTE — TELEPHONE ENCOUNTER
FUTURE VISIT INFORMATION      FUTURE VISIT INFORMATION:    Date: 1/23/19    Time: 3PM    [place] [facility] [place]  REFERRAL INFORMATION:    Referring provider:  Dr. Lisa Martinez    Referring providers clinic:  [facility]    Reason for visit/diagnosis: Chronic Diarrhea    NOTES STATUS DETAILS   OFFICE NOTE from referring provider In process 8/7/18, 9/6/18, 6/23/18   OFFICE NOTE from other specialist Internal Dr. Leo, Dr. Mckeon, masoud Lucero CNP   DISCHARGE SUMMARY from hospital Internal 9/25/18, 3/14/18   OPERATIVE REPORT Internal 3/14/18   MEDICATION LIST Internal         ENDOSCOPY  N/A    COLONOSCOPY Internal 8/9/17   ERCP N/A    EUS N/A    STOOL TESTING Internal    PERTINENT LABS Internal    PATHOLOGY REPORTS (RELATED) Internal    IMAGING (CT, MRI, EGD) Internal PACS

## 2019-01-21 DIAGNOSIS — R19.7 DIARRHEA, UNSPECIFIED TYPE: ICD-10-CM

## 2019-01-22 RX ORDER — LACTOBACILLUS RHAMNOSUS GG 10B CELL
1 CAPSULE ORAL DAILY
Qty: 90 CAPSULE | Refills: 3 | Status: SHIPPED | OUTPATIENT
Start: 2019-01-22 | End: 2020-02-17

## 2019-01-22 NOTE — TELEPHONE ENCOUNTER
Routing refill request to provider for review/approval because:  Drug not on the [facility] refill protocol   Dannielle DSOUZA RN    Requested Prescriptions   Pending Prescriptions Disp Refills     Lactobacillus-Inulin ([facility] DIGESTIVE [facility]) CAPS 90 capsule 0     Sig: Take 1 capsule by mouth daily    There is no refill protocol information for this order

## 2019-01-23 ENCOUNTER — PRE VISIT (OUTPATIENT)
Dept: GASTROENTEROLOGY | Facility: CLINIC | Age: 69
End: 2019-01-23

## 2019-02-05 ENCOUNTER — OFFICE VISIT (OUTPATIENT)
Dept: FAMILY MEDICINE | Facility: CLINIC | Age: 69
End: 2019-02-05
Payer: COMMERCIAL

## 2019-02-05 VITALS
TEMPERATURE: 98 F | SYSTOLIC BLOOD PRESSURE: 162 MMHG | HEART RATE: 59 BPM | DIASTOLIC BLOOD PRESSURE: 80 MMHG | WEIGHT: 101 LBS | BODY MASS INDEX: 18.58 KG/M2 | HEIGHT: 62 IN | OXYGEN SATURATION: 97 %

## 2019-02-05 DIAGNOSIS — C34.11 MALIGNANT NEOPLASM OF UPPER LOBE OF RIGHT LUNG (H): ICD-10-CM

## 2019-02-05 DIAGNOSIS — J01.00 ACUTE NON-RECURRENT MAXILLARY SINUSITIS: Primary | ICD-10-CM

## 2019-02-05 DIAGNOSIS — D84.9 IMMUNOSUPPRESSED STATUS (H): ICD-10-CM

## 2019-02-05 DIAGNOSIS — I73.9 PERIPHERAL ARTERY DISEASE (H): ICD-10-CM

## 2019-02-05 PROCEDURE — 99213 OFFICE O/P EST LOW 20 MIN: CPT | Performed by: PHYSICIAN ASSISTANT

## 2019-02-05 RX ORDER — AMOXICILLIN 875 MG
875 TABLET ORAL 2 TIMES DAILY
Qty: 20 TABLET | Refills: 0 | Status: SHIPPED | OUTPATIENT
Start: 2019-02-05 | End: 2019-03-13

## 2019-02-05 ASSESSMENT — MIFFLIN-ST. JEOR: SCORE: 951.38

## 2019-02-05 NOTE — NURSING NOTE
"Chief Complaint   Patient presents with     Pharyngitis     right ear pain for a day     initial /80 (BP Location: Right arm, Cuff Size: Adult Regular)   Pulse 59   Temp 98  F (36.7  C) (Oral)   Ht 1.575 m (5' 2\")   Wt 45.8 kg (101 lb)   SpO2 97%   BMI 18.47 kg/m   Estimated body mass index is 18.47 kg/m  as calculated from the following:    Height as of this encounter: 1.575 m (5' 2\").    Weight as of this encounter: 45.8 kg (101 lb).  BP completed using cuff size: regular.   R arm      Health Maintenance that is potentially due pending provider review:  NONE    n/a    Federico Olivares ma  "

## 2019-02-05 NOTE — PROGRESS NOTES
"  SUBJECTIVE:   Maribel Yang is a 68 year old female who presents to clinic today for the following health issues:      RESPIRATORY SYMPTOMS      Duration: right ear and throat pain    Description  sore throat and ear    Severity: moderate    Accompanying signs and symptoms: None    History (predisposing factors):  none    Precipitating or alleviating factors: None    Therapies tried and outcome:  rest and fluids          Problem list and histories reviewed & adjusted, as indicated.  Additional history: 69 y/o new to me female c/o ST, drainage and right ear pain.  This did wake her up in the am.  She has taken a bit of tylenol.    She is quite high risk for infections secondary to lung cancer, kidney replacement, and PAD.      BP Readings from Last 3 Encounters:   02/05/19 162/80   10/30/18 150/75   09/25/18 146/83    Wt Readings from Last 3 Encounters:   02/05/19 45.8 kg (101 lb)   10/30/18 45.3 kg (99 lb 12.8 oz)   09/25/18 45.8 kg (100 lb 15.5 oz)                    Reviewed and updated as needed this visit by clinical staff  Tobacco  Allergies  Meds       Reviewed and updated as needed this visit by Provider         ROS:  Constitutional, HEENT, cardiovascular, pulmonary, gi and gu systems are negative, except as otherwise noted.    OBJECTIVE:     /80 (BP Location: Right arm, Cuff Size: Adult Regular)   Pulse 59   Temp 98  F (36.7  C) (Oral)   Ht 1.575 m (5' 2\")   Wt 45.8 kg (101 lb)   SpO2 97%   BMI 18.47 kg/m    Body mass index is 18.47 kg/m .  GENERAL: alert and no distress  EYES: Eyes grossly normal to inspection  HENT: normal cephalic/atraumatic, right ear: fair amount of cerumen, but does appear to have effusion present, left ear: clear effusion, nasal mucosa edematous , oral mucous membranes moist and tonsillar erythema  RESP: lungs clear to auscultation - no rales, rhonchi or wheezes  CV: regular rate and rhythm, normal S1 S2, no S3 or S4, no murmur, click or rub, no peripheral " edema and peripheral pulses strong  PSYCH: mentation appears normal, affect normal/bright    Diagnostic Test Results:  none     ASSESSMENT/PLAN:             1. Acute non-recurrent maxillary sinusitis  While symptoms have only been present for 1 day, she is such high risk for bacterial infection, will start antibiotic.  Did encourage patient to contact us later in the week if symptoms persist or worsen   - amoxicillin (AMOXIL) 875 MG tablet; Take 1 tablet (875 mg) by mouth 2 times daily for 10 days  Dispense: 20 tablet; Refill: 0    2. Peripheral artery disease (H)      3. Immunosuppressed status (H)  Secondary to kidney transplant, on prednisone and sirolimus    4. Malignant neoplasm of upper lobe of right lung (H)          Waylon Swanson PA-C  [facility]

## 2019-02-15 ENCOUNTER — ANCILLARY PROCEDURE (OUTPATIENT)
Dept: CT IMAGING | Facility: CLINIC | Age: 69
End: 2019-02-15
Attending: INTERNAL MEDICINE
Payer: COMMERCIAL

## 2019-02-15 DIAGNOSIS — Z94.0 KIDNEY REPLACED BY TRANSPLANT: ICD-10-CM

## 2019-02-15 DIAGNOSIS — C21.1 MALIGNANT NEOPLASM OF ANAL CANAL (H): ICD-10-CM

## 2019-02-15 LAB
ALBUMIN SERPL-MCNC: 3.4 G/DL (ref 3.4–5)
ALP SERPL-CCNC: 121 U/L (ref 40–150)
ALT SERPL W P-5'-P-CCNC: 18 U/L (ref 0–50)
ANION GAP SERPL CALCULATED.3IONS-SCNC: 6 MMOL/L (ref 3–14)
AST SERPL W P-5'-P-CCNC: 16 U/L (ref 0–45)
BASOPHILS # BLD AUTO: 0 10E9/L (ref 0–0.2)
BASOPHILS NFR BLD AUTO: 0.6 %
BILIRUB SERPL-MCNC: 0.2 MG/DL (ref 0.2–1.3)
BUN SERPL-MCNC: 21 MG/DL (ref 7–30)
CALCIUM SERPL-MCNC: 9.1 MG/DL (ref 8.5–10.1)
CHLORIDE SERPL-SCNC: 107 MMOL/L (ref 94–109)
CO2 SERPL-SCNC: 26 MMOL/L (ref 20–32)
CREAT SERPL-MCNC: 1.33 MG/DL (ref 0.52–1.04)
DIFFERENTIAL METHOD BLD: ABNORMAL
EOSINOPHIL # BLD AUTO: 0.1 10E9/L (ref 0–0.7)
EOSINOPHIL NFR BLD AUTO: 1.2 %
ERYTHROCYTE [DISTWIDTH] IN BLOOD BY AUTOMATED COUNT: 14 % (ref 10–15)
GFR SERPL CREATININE-BSD FRML MDRD: 42 ML/MIN/{1.73_M2}
GLUCOSE SERPL-MCNC: 102 MG/DL (ref 70–99)
HCT VFR BLD AUTO: 42.7 % (ref 35–47)
HGB BLD-MCNC: 13.9 G/DL (ref 11.7–15.7)
IMM GRANULOCYTES # BLD: 0 10E9/L (ref 0–0.4)
IMM GRANULOCYTES NFR BLD: 0.4 %
LYMPHOCYTES # BLD AUTO: 0.3 10E9/L (ref 0.8–5.3)
LYMPHOCYTES NFR BLD AUTO: 6.7 %
MCH RBC QN AUTO: 28.3 PG (ref 26.5–33)
MCHC RBC AUTO-ENTMCNC: 32.6 G/DL (ref 31.5–36.5)
MCV RBC AUTO: 87 FL (ref 78–100)
MONOCYTES # BLD AUTO: 0.4 10E9/L (ref 0–1.3)
MONOCYTES NFR BLD AUTO: 6.9 %
NEUTROPHILS # BLD AUTO: 4.3 10E9/L (ref 1.6–8.3)
NEUTROPHILS NFR BLD AUTO: 84.2 %
NRBC # BLD AUTO: 0 10*3/UL
NRBC BLD AUTO-RTO: 0 /100
PLATELET # BLD AUTO: 255 10E9/L (ref 150–450)
POTASSIUM SERPL-SCNC: 3.6 MMOL/L (ref 3.4–5.3)
PROT SERPL-MCNC: 7.7 G/DL (ref 6.8–8.8)
RBC # BLD AUTO: 4.91 10E12/L (ref 3.8–5.2)
SIROLIMUS BLD-MCNC: 5 UG/L (ref 5–15)
SODIUM SERPL-SCNC: 139 MMOL/L (ref 133–144)
TME LAST DOSE: NORMAL H
WBC # BLD AUTO: 5.1 10E9/L (ref 4–11)

## 2019-02-15 PROCEDURE — 87799 DETECT AGENT NOS DNA QUANT: CPT | Performed by: INTERNAL MEDICINE

## 2019-02-15 PROCEDURE — 85025 COMPLETE CBC W/AUTO DIFF WBC: CPT | Performed by: INTERNAL MEDICINE

## 2019-02-15 PROCEDURE — 80053 COMPREHEN METABOLIC PANEL: CPT | Performed by: INTERNAL MEDICINE

## 2019-02-15 PROCEDURE — 80195 ASSAY OF SIROLIMUS: CPT | Performed by: INTERNAL MEDICINE

## 2019-02-15 NOTE — NURSING NOTE
Chief Complaint   Patient presents with     Lab Only     labs drawn with vpt by rn.  vs taken     Labs drawn with vpt by rn.  Pt tolerated well.  VS taken.  Pt checked in for next appt.    rTesa Penn RN

## 2019-02-18 ENCOUNTER — ONCOLOGY VISIT (OUTPATIENT)
Dept: ONCOLOGY | Facility: CLINIC | Age: 69
End: 2019-02-18
Attending: INTERNAL MEDICINE
Payer: COMMERCIAL

## 2019-02-18 VITALS
TEMPERATURE: 97.3 F | WEIGHT: 99.4 LBS | OXYGEN SATURATION: 95 % | SYSTOLIC BLOOD PRESSURE: 121 MMHG | HEART RATE: 77 BPM | BODY MASS INDEX: 18.29 KG/M2 | RESPIRATION RATE: 16 BRPM | DIASTOLIC BLOOD PRESSURE: 74 MMHG | HEIGHT: 62 IN

## 2019-02-18 DIAGNOSIS — C21.1 MALIGNANT NEOPLASM OF ANAL CANAL (H): Primary | ICD-10-CM

## 2019-02-18 LAB
EBV DNA # SPEC NAA+PROBE: 7769 {COPIES}/ML
EBV DNA SPEC NAA+PROBE-LOG#: 3.9 {LOG_COPIES}/ML

## 2019-02-18 PROCEDURE — G0463 HOSPITAL OUTPT CLINIC VISIT: HCPCS | Mod: ZF

## 2019-02-18 PROCEDURE — 99214 OFFICE O/P EST MOD 30 MIN: CPT | Mod: ZP | Performed by: INTERNAL MEDICINE

## 2019-02-18 ASSESSMENT — PAIN SCALES - GENERAL: PAINLEVEL: NO PAIN (0)

## 2019-02-18 ASSESSMENT — MIFFLIN-ST. JEOR: SCORE: 944.26
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
## 2019-02-18 NOTE — NURSING NOTE
"Oncology Rooming Note    February 18, 2019 1:19 PM   Maribel Yang is a 68 year old female who presents for:    Chief Complaint   Patient presents with     RECHECK     ONC Anal CA 6 month f/up     Initial Vitals: /74 (BP Location: Left arm, Patient Position: Sitting, Cuff Size: Adult Regular)   Pulse 77   Temp 97.3  F (36.3  C) (Oral)   Resp 16   Ht 1.575 m (5' 2.01\")   Wt 45.1 kg (99 lb 6.4 oz)   SpO2 95%   BMI 18.18 kg/m   Estimated body mass index is 18.18 kg/m  as calculated from the following:    Height as of this encounter: 1.575 m (5' 2.01\").    Weight as of this encounter: 45.1 kg (99 lb 6.4 oz). Body surface area is 1.4 meters squared.  No Pain (0) Comment: Data Unavailable   No LMP recorded. Patient is postmenopausal.  Allergies reviewed: Yes  Medications reviewed: Yes    Medications: Medication refills not needed today.  Pharmacy name entered into [facility]:    [facility]  [facility] - [place], [place] - [address] [place] [address] [address]VE [facility] - [place], [place] -  [facility] [phone]    Clinical concerns: none      6 minutes for nursing intake (face to face time)     Shanta FELIX Matthews              "

## 2019-02-20 ENCOUNTER — TELEPHONE (OUTPATIENT)
Dept: SURGERY | Facility: CLINIC | Age: 69
End: 2019-02-20

## 2019-02-20 DIAGNOSIS — I15.1 HYPERTENSION SECONDARY TO OTHER RENAL DISORDERS: ICD-10-CM

## 2019-02-20 NOTE — TELEPHONE ENCOUNTER
[facility] Call Center    Phone Message    May a detailed message be left on voicemail: yes    Reason for Call: Other: Pati from [facility]:  Per Pati pt is needing to juan diego a f/u with Ahmet in March, Writer was only able to find a 4/2019 appt. Per last OV notes (9/11/18) states pt needs to juan diego Anoscopy along with f/u, please call pt to further assist thanks.     Action Taken: Message routed to:  [facility] Colon

## 2019-02-21 NOTE — TELEPHONE ENCOUNTER
"PN  Routing refill request to provider for review/approval because:  Labs out of range:  Creatinine = 1.33 (2/15/19)  Last OV and Rx 8/7/18 - #30 with 5 refills.    Thanks,  Monse Hopson, AVERY    Requested Prescriptions   Pending Prescriptions Disp Refills     NIFEdipine ER OSMOTIC (PROCARDIA XL) 90 MG 24 hr tablet [Pharmacy Med Name: NIFEDIPINE ER 90MG OSM TB24] 30 tablet 5     Sig: TAKE ONE TABLET BY MOUTH EVERY DAY    Calcium Channel Blockers Protocol  Failed - 2/20/2019 11:02 AM       Failed - Normal serum creatinine on file in past 12 months    Recent Labs   Lab Test 02/15/19  1345  04/27/12  1522   CR 1.33*   < >  --    CREAT  --   --  1.7*    < > = values in this interval not displayed.            Passed - Blood pressure under 140/90 in past 12 months    BP Readings from Last 3 Encounters:   02/18/19 121/74   02/05/19 162/80   10/30/18 150/75                Passed - Recent (12 mo) or future (30 days) visit within the authorizing provider's specialty    Patient had office visit in the last 12 months or has a visit in the next 30 days with authorizing provider or within the authorizing provider's specialty.  See \"Patient Info\" tab in inbasket, or \"Choose Columns\" in Meds & Orders section of the refill encounter.             Passed - Medication is active on med list       Passed - Patient is age 18 or older       Passed - No active pregnancy on record       Passed - No positive pregnancy test in past 12 months                  "

## 2019-02-22 RX ORDER — NIFEDIPINE 90 MG/1
TABLET, EXTENDED RELEASE ORAL
Qty: 30 TABLET | Refills: 5 | Status: SHIPPED | OUTPATIENT
Start: 2019-02-22 | End: 2020-02-21

## 2019-02-22 NOTE — TELEPHONE ENCOUNTER
Faxed Rx  Please let her know she is due for follow-up visit with me to recheck some labs  Thanks  PN

## 2019-02-22 NOTE — TELEPHONE ENCOUNTER
Left non detailed VM for pt asking that they callback and schedule (f/u with PCP)  Dannielle DSOUZA RN

## 2019-02-26 NOTE — TELEPHONE ENCOUNTER
Next 5 appointments (look out 90 days)    Mar 13, 2019  2:00 PM CDT  PHYSICAL with Lisa Martinez DO  [facility] [address]  [facility] [address]  [phone]        Dannielle DSOUZA RN

## 2019-02-27 ENCOUNTER — ANCILLARY PROCEDURE (OUTPATIENT)
Dept: MAMMOGRAPHY | Facility: CLINIC | Age: 69
End: 2019-02-27
Attending: FAMILY MEDICINE
Payer: COMMERCIAL

## 2019-02-27 ENCOUNTER — TELEPHONE (OUTPATIENT)
Dept: SURGERY | Facility: CLINIC | Age: 69
End: 2019-02-27

## 2019-02-27 ENCOUNTER — ANCILLARY PROCEDURE (OUTPATIENT)
Dept: BONE DENSITY | Facility: CLINIC | Age: 69
End: 2019-02-27
Attending: FAMILY MEDICINE
Payer: COMMERCIAL

## 2019-02-27 DIAGNOSIS — Z79.52 CURRENT CHRONIC USE OF SYSTEMIC STEROIDS: ICD-10-CM

## 2019-02-27 DIAGNOSIS — Z12.31 VISIT FOR SCREENING MAMMOGRAM: ICD-10-CM

## 2019-03-06 ENCOUNTER — DOCUMENTATION ONLY (OUTPATIENT)
Dept: TRANSPLANT | Facility: CLINIC | Age: 69
End: 2019-03-06

## 2019-03-06 ENCOUNTER — TELEPHONE (OUTPATIENT)
Dept: TRANSPLANT | Facility: CLINIC | Age: 69
End: 2019-03-06

## 2019-03-06 NOTE — TELEPHONE ENCOUNTER
[facility] called- stated they shipped out Rapamune pt will received end of this week or early next week

## 2019-03-06 NOTE — LETTER
PHYSICIAN ORDERS    DATE & TIME ISSUED: 2019 4:02 PM  PATIENT NAME: Maribel Yang   : 1950     [facility] MR# [if applicable]: [id number]     DIAGNOSIS / ICD - 10 CODES    Kidney Transplanted (Z94.0)    After Care Following Organ Transplant (Z48.298)    Long Term Use of Medication (Z79.899)      Every 3 months    Hemogram and Platelet    Basic Metabolic Panel (Sodium,potassium,chloride,CO2,creatinine,urea,nitrogen,glucose,calcium)    Sirolimus level    EBV PCR Quantitative    Every 6 months    Urine for protein creatinine ratio      Patient should release information to the [facility].   Please fax results to the Transplant Center at [phone].  Any questions please call [phone] or [phone].      .

## 2019-03-06 NOTE — PROGRESS NOTES
Chart Prep    Clinic Visit on:  4/26/19    Last lab completed:  2/15/19    Lab letter updated: 3/6/19    Lab: [facility] ;lab    Lab orders up to date in Epic.

## 2019-03-07 NOTE — TELEPHONE ENCOUNTER
Call placed to patient. No answer. Voice message left making patient aware that her Rapamune has shipped.

## 2019-03-13 ENCOUNTER — OFFICE VISIT (OUTPATIENT)
Dept: FAMILY MEDICINE | Facility: CLINIC | Age: 69
End: 2019-03-13
Payer: COMMERCIAL

## 2019-03-13 VITALS
WEIGHT: 100.9 LBS | HEIGHT: 62 IN | HEART RATE: 71 BPM | DIASTOLIC BLOOD PRESSURE: 78 MMHG | OXYGEN SATURATION: 97 % | BODY MASS INDEX: 18.57 KG/M2 | TEMPERATURE: 98.5 F | SYSTOLIC BLOOD PRESSURE: 135 MMHG

## 2019-03-13 DIAGNOSIS — Z23 VACCINE FOR DIPHTHERIA-TETANUS: ICD-10-CM

## 2019-03-13 DIAGNOSIS — M81.0 AGE-RELATED OSTEOPOROSIS WITHOUT CURRENT PATHOLOGICAL FRACTURE: ICD-10-CM

## 2019-03-13 DIAGNOSIS — H61.21 IMPACTED CERUMEN OF RIGHT EAR: ICD-10-CM

## 2019-03-13 DIAGNOSIS — E78.2 MIXED HYPERLIPIDEMIA: ICD-10-CM

## 2019-03-13 DIAGNOSIS — Z00.00 ENCOUNTER FOR ROUTINE ADULT HEALTH EXAMINATION WITHOUT ABNORMAL FINDINGS: Primary | ICD-10-CM

## 2019-03-13 PROCEDURE — G0438 PPPS, INITIAL VISIT: HCPCS | Performed by: FAMILY MEDICINE

## 2019-03-13 PROCEDURE — 90471 IMMUNIZATION ADMIN: CPT | Performed by: FAMILY MEDICINE

## 2019-03-13 PROCEDURE — 90715 TDAP VACCINE 7 YRS/> IM: CPT | Performed by: FAMILY MEDICINE

## 2019-03-13 PROCEDURE — 69210 REMOVE IMPACTED EAR WAX UNI: CPT | Mod: RT | Performed by: FAMILY MEDICINE

## 2019-03-13 ASSESSMENT — MIFFLIN-ST. JEOR: SCORE: 950.93

## 2019-03-13 ASSESSMENT — ACTIVITIES OF DAILY LIVING (ADL): CURRENT_FUNCTION: NO ASSISTANCE NEEDED

## 2019-03-13 NOTE — PROGRESS NOTES
"SUBJECTIVE:   Maribel Yang is a 68 year old female who presents for Preventive Visit.    Are you in the first 12 months of your Medicare coverage?  No    Annual Wellness Visit     In general, how would you rate your overall health?  Fair    Frequency of exercise:  None    Do you usually eat at least 4 servings of fruit and vegetables a day, include whole grains    & fiber and avoid regularly eating high fat or \"junk\" foods?  No    Taking medications regularly:  Yes    Medication side effects:  None    Ability to successfully perform activities of daily living:  No assistance needed    Home Safety:  No safety concerns identified    Hearing Impairment:  Difficulty following a conversation in a noisy restaurant or crowded room    In the past 6 months, have you been bothered by leaking of urine? Yes    In general, how would you rate your overall mental or emotional health?  Good    PHQ-2 Total Score: 0    Additional concerns today:  Yes    Do you feel safe in your environment? Yes    Do you have a Health Care Directive? No: Advance care planning was reviewed with patient; patient declined at this time.    all risk  Fallen 2 or more times in the past year?: No  Any fall with injury in the past year?: No    Cognitive Screening   1) Repeat 3 items (Leader, Season, Table)    2) Clock draw: NORMAL  3) 3 item recall: Recalls 1 object   Results: NORMAL clock, 1-2 items recalled: COGNITIVE IMPAIRMENT LESS LIKELY    Mini-CogTM Copyright TERESSA Antonio. Licensed by the author for use in [facility]; reprinted with permission (maik@.[email or web address]). All rights reserved.      Do you have sleep apnea, excessive snoring or daytime drowsiness?: no    Reviewed and updated as needed this visit by clinical staff  Tobacco  Allergies  Meds         Reviewed and updated as needed this visit by Provider  Meds        Social History     Tobacco Use     Smoking status: Former Smoker     Packs/day: 0.30     Years: 35.00     Pack years: " 10.50     Types: Cigarettes     Last attempt to quit: 2014     Years since quittin.1     Smokeless tobacco: Never Used     Tobacco comment: occ cig   Substance Use Topics     Alcohol use: Yes     Alcohol/week: 0.0 oz     Comment: rare       Alcohol Use 3/13/2019   If you drink alcohol do you typically have greater than 3 drinks per day OR greater than 7 drinks per week? No   No flowsheet data found.        -------------------------------------    Current providers sharing in care for this patient include:   Patient Care Team:  Lisa Martinez DO as PCP - General (Family Practice)  Hitesh See MD as MD (Nephrology)  Nyasia Gaines MD as Referring Physician (OB/Gyn)  Joel Davis MD as MD (Family Practice)  Rosalie Pelletier PA-C as Physician Assistant (Physician Assistant)  Lisa Martinez DO as Assigned PCP  Liliana Renteria MD as MD (Oncology)  Meggan Tucker MD as MD (Hematology & Oncology)  Kianna Dyer, RN as Nurse Coordinator (Hematology & Oncology)    The following health maintenance items are reviewed in Epic and correct as of today:  Health Maintenance   Topic Date Due     ZOSTER IMMUNIZATION (1 of 2) 12/15/2002     PHQ-2 Q1 YR  2017     ADVANCE DIRECTIVE PLANNING Q5 YRS  10/03/2017     MEDICARE ANNUAL WELLNESS VISIT  12/15/2017     PAP Q6 MOS DIAGNOSTIC  2019     COLPOSCOPY Q6 MOS  2019     FALL RISK ASSESSMENT  2019     DTAP/TDAP/TD IMMUNIZATION (3 - Td) 2019     MAMMO Q1 YR  2020     COLONOSCOPY Q5 YR  2022     LIPID SCREEN Q5 YR FEMALE (SYSTEM ASSIGNED)  08/10/2023     DEXA SCAN SCREENING (SYSTEM ASSIGNED)  Completed     INFLUENZA VACCINE  Completed     HEPATITIS C SCREENING  Completed     IPV IMMUNIZATION  Aged Out     MENINGITIS IMMUNIZATION  Aged Out     Patient Active Problem List   Diagnosis     Other specified congenital anomalies     Kidney replaced by transplant     S/P LEEP of cervix     CARDIOVASCULAR  SCREENING; LDL GOAL LESS THAN 100     Immunosuppression (H)     HTN, kidney transplant related     History of basal cell carcinoma     YUNIOR III (vulvar intraepithelial neoplasia III)     Peripheral artery disease (H)     Squamous cell lung cancer (H)     Lumbago     Vaginal dysplasia     Alopecia     Cherry angioma     Skin cancer screening     Intertrigo     History of basal cell carcinoma     Malignant neoplasm of anal canal (H)     Aftercare following organ transplant     Past Surgical History:   Procedure Laterality Date     BIOPSY ANAL N/A 3/14/2018    Procedure: BIOPSY ANAL;;  Surgeon: Shabbir Leo MD;  Location: [facility] OR     C NONSPECIFIC PROCEDURE      Thrombectomy     C NONSPECIFIC PROCEDURE  1955 and 1959    Bilater eye surgery - correction for crossed eyes     C NONSPECIFIC PROCEDURE  1998    oopherectomy L     C NONSPECIFIC PROCEDURE  1967    open kidney biopsy - L     C TRANSPLANTATION OF KIDNEY  9/04    recipient -- done at [facility]     COLONOSCOPY       COLONOSCOPY N/A 8/9/2017    Procedure: COMBINED COLONOSCOPY, SINGLE OR MULTIPLE BIOPSY/POLYPECTOMY BY BIOPSY;;  Surgeon: Sushil Hyatt MD;  Location:  GI     COLPOSCOPY,LOOP ELECTRD CERVIX EXCIS  03/11/08    TAL II     CONIZATION LEEP  7/17/2013    Procedure: CONIZATION LEEP;;  Surgeon: Liliana Renteria MD;  Location:  OR     CONIZATION LEEP N/A 8/17/2016    Procedure: CONIZATION LEEP;  Surgeon: Liliana Renteria MD;  Location:  OR     EXAM UNDER ANESTHESIA ANUS  7/15/2014    Procedure: EXAM UNDER ANESTHESIA ANUS;  Surgeon: Radha Musa MD;  Location:  OR     EXAM UNDER ANESTHESIA ANUS N/A 3/14/2018    Procedure: EXAM UNDER ANESTHESIA ANUS;  Anal Exam Under Anesthesia With Excision of anal lesion, proctoscopy;  Surgeon: Shabbir Leo MD;  Location:  OR     EYE SURGERY       LASER CO2 EXCISE VULVA WIDE LOCAL  7/15/2014    Procedure: LASER CO2 EXCISE VULVA WIDE LOCAL;  Surgeon: Liliana Renteria MD;  Location:   OR     LASER CO2 VAGINA  2013    Procedure: LASER CO2 VAGINA;;  Surgeon: Liliana Renteria MD;  Location: [facility] OR     LASER CO2 VAGINA N/A 2018    Procedure: LASER CO2 VAGINA;  Exam Under Anesthesia, CO2 Laser Ablation of Upper Vagina and Cervix;  Surgeon: Pati Garcia MD;  Location: [facility] OR     MICROSCOPY ANAL  2013    Procedure: MICROSCOPY ANAL;  Anal Microscopy,  EUA vagina,Colposcopy Of Vagina And Vulva, Vaginal Biopsies, Omniguide Co2 Laser To Vagina and vulva, Loop Electrosurgical Excision Procedure To Cervix;  Surgeon: Radha Musa MD;  Location: [facility]     MICROSCOPY ANAL  7/15/2014    Procedure: MICROSCOPY ANAL;  Surgeon: Rahda Musa MD;  Location: [facility] OR       Social History     Tobacco Use     Smoking status: Former Smoker     Packs/day: 0.30     Years: 35.00     Pack years: 10.50     Types: Cigarettes     Last attempt to quit: 2014     Years since quittin.1     Smokeless tobacco: Never Used     Tobacco comment: occ cig   Substance Use Topics     Alcohol use: Yes     Alcohol/week: 0.0 oz     Comment: rare     Family History   Problem Relation Age of Onset     Diabetes Father         type 2 diag age,60's     Alcohol/Drug Father      Arthritis Father      Hypertension Father      Lipids Father         high cholesterol     Arthritis Mother      Diabetes Mother      Depression Mother      Heart Disease Mother      Neurologic Disorder Mother      Obesity Mother      Psychotic Disorder Mother      Thyroid Disease Mother      Gynecology Sister         Precancerous cell removal from cervix at age 45     Depression Sister      Allergies Sister      Alcohol/Drug Sister      Neurologic Disorder Sister      Cerebrovascular Disease Paternal Grandmother          of a stroke in her 80's     Diabetes Paternal Grandmother      Alcohol/Drug Son      Colon Polyps Sister      Colon Cancer No family hx of      Crohn's Disease No family hx of      Ulcerative Colitis No  "family hx of      Melanoma No family hx of      Skin Cancer No family hx of          Pneumonia Vaccine:UTD  Mammogram Screening: Mammogram Screening: Patient over age 50, mutual decision to screen reflected in health maintenance.  Gyn - pap follows with gyn onc due to HPV     Review of Systems  Constitutional, HEENT, cardiovascular, pulmonary, GI, , musculoskeletal, neuro, skin, endocrine and psych systems are negative, except as otherwise noted.    OBJECTIVE:   /78   Pulse 71   Temp 98.5  F (36.9  C) (Oral)   Ht 1.575 m (5' 2\")   Wt 45.8 kg (100 lb 14.4 oz)   SpO2 97%   BMI 18.45 kg/m   Estimated body mass index is 18.45 kg/m  as calculated from the following:    Height as of this encounter: 1.575 m (5' 2\").    Weight as of this encounter: 45.8 kg (100 lb 14.4 oz).  Physical Exam  GENERAL APPEARANCE: alert, no distress and over weight  EYES: Eyes grossly normal to inspection, PERRL and conjunctivae and sclerae normal  HENT: nose and mouth without ulcers or lesions, oropharynx clear, oral mucous membranes moist, right ear: occluded with wax and removed with lighted curette and water irrigation with complete resoluction - TM clear after removal - and left ear: normal: no effusions, no erythema, normal landmarks  NECK: no adenopathy, no asymmetry, masses, or scars and thyroid normal to palpation  RESP: lungs clear to auscultation - no rales, rhonchi or wheezes  BREAST: normal without masses, tenderness or nipple discharge and no palpable axillary masses or adenopathy  CV: regular rate and rhythm, normal S1 S2, no S3 or S4, no murmur, click or rub, no peripheral edema and peripheral pulses strong  ABDOMEN: soft, nontender and transplanted kidney in the left lower abdomen  MS: no musculoskeletal defects are noted and gait is age appropriate without ataxia  SKIN: no suspicious lesions or rashes  NEURO: Normal strength and tone, sensory exam grossly normal, mentation intact and speech normal  PSYCH: " mentation appears normal and affect normal/bright    Diagnostic Test Results:  Results for orders placed or performed in visit on 02/27/19   *MA Screening Digital Bilateral    Narrative    Examination: Bilateral digital screening mammography with computer  aided detection.    Comparison: 9/6/2017, 6/1/2016, 3/18/2015    History/Family history: No symptoms, routine screening. 1 prior  biopsy. One niece with breast cancer at age 40.    BREAST DENSITY: Scattered fibroglandular densities.    COMMENTS: There has been no significant change.        Impression    IMPRESSION: BI-RADS CATEGORY: 1 -  Negative.     RECOMMENDED FOLLOW-UP: Annual Mammography.    Results to be sent to the patient.     I have personally reviewed the examination and initial interpretation  and I agree with the findings.    SUKHI MOORE MD     *Note: Due to a large number of results and/or encounters for the requested time period, some results have not been displayed. A complete set of results can be found in Results Review.       ASSESSMENT / PLAN:   1. Encounter for routine adult health examination without abnormal findings  Routine screening    2. Age-related osteoporosis without current pathological fracture  Noted on DEXA recently -   Severe  Did discuss bisphosphonates however patient Creat Clearance is 30 and contraindicated in <35  Will refer to endocrine to discuss other options  Pt is on prednisone daily  - ENDOCRINOLOGY ADULT REFERRAL    3. Mixed hyperlipidemia  Will order lipids this summer  - Lipid panel reflex to direct LDL Fasting; Future    4. Vaccine for diphtheria-tetanus  Given   - TDAP, IM (10 - 64 YRS) - Adacel  - VACCINE ADMINISTRATION, INITIAL    5. Impacted cerumen of right ear  As above  - REMOVE IMPACTED CERUMEN    End of Life Planning:  Patient currently has an advanced directive: Yes.  Practitioner is supportive of decision.    COUNSELING:  Reviewed preventive health counseling, as reflected in patient instructions    BP  "Readings from Last 1 Encounters:   03/13/19 135/78     Estimated body mass index is 18.45 kg/m  as calculated from the following:    Height as of this encounter: 1.575 m (5' 2\").    Weight as of this encounter: 45.8 kg (100 lb 14.4 oz).      Weight management plan noted, stable and monitoring     reports that she quit smoking about 5 years ago. Her smoking use included cigarettes. She has a 10.50 pack-year smoking history. she has never used smokeless tobacco.      Appropriate preventive services were discussed with this patient, including applicable screening as appropriate for cardiovascular disease, diabetes, osteopenia/osteoporosis, and glaucoma.  As appropriate for age/gender, discussed screening for colorectal cancer, prostate cancer, breast cancer, and cervical cancer. Checklist reviewing preventive services available has been given to the patient.    Reviewed patients plan of care and provided an AVS. The Basic Care Plan (routine screening as documented in Health Maintenance) for Maribel meets the Care Plan requirement. This Care Plan has been established and reviewed with the Patient.    Counseling Resources:  ATP IV Guidelines  Pooled Cohorts Equation Calculator  Breast Cancer Risk Calculator  FRAX Risk Assessment  ICSI Preventive Guidelines  Dietary Guidelines for Americans, 2010  USDA's MyPlate  ASA Prophylaxis  Lung CA Screening    Lisa Martinez, DO  [facility]  "

## 2019-03-13 NOTE — NURSING NOTE
"Chief Complaint   Patient presents with     Medicare Visit     /78   Pulse 71   Temp 98.5  F (36.9  C) (Oral)   Ht 1.575 m (5' 2\")   Wt 45.8 kg (100 lb 14.4 oz)   SpO2 97%   BMI 18.45 kg/m   Estimated body mass index is 18.45 kg/m  as calculated from the following:    Height as of this encounter: 1.575 m (5' 2\").    Weight as of this encounter: 45.8 kg (100 lb 14.4 oz).  Medication Reconciliation: complete      Health Maintenance that is potentially due pending provider review:  Pap Smear    Pt states that she gets pap smear and colp done at the [facility] every 6 months.    LEEANN Gallego  "

## 2019-03-13 NOTE — NURSING NOTE
Screening Questionnaire for Adult Immunization    Are you sick today?   Yes   Do you have allergies to medications, food, a vaccine component or latex?   No   Have you ever had a serious reaction after receiving a vaccination?   No   Do you have a long-term health problem with heart disease, lung disease, asthma, kidney disease, metabolic disease (e.g. diabetes), anemia, or other blood disorder?   No   Do you have cancer, leukemia, HIV/AIDS, or any other immune system problem?   No   In the past 3 months, have you taken medications that affect  your immune system, such as prednisone, other steroids, or anticancer drugs; drugs for the treatment of rheumatoid arthritis, Crohn s disease, or psoriasis; or have you had radiation treatments?   No   Have you had a seizure, or a brain or other nervous system problem?   No   During the past year, have you received a transfusion of blood or blood     products, or been given immune (gamma) globulin or antiviral drug?   No   For women: Are you pregnant or is there a chance you could become        pregnant during the next month?   No   Have you received any vaccinations in the past 4 weeks?   No     Immunization questionnaire answers were all negative.        Per orders of Dr. GARCIA, injection of TDAP given by Michelle Marquez. Patient instructed to remain in clinic for 15 minutes afterwards, and to report any adverse reaction to me immediately.       Screening performed by Michelle Marquez on 3/13/2019 at 3:08 PM.  '

## 2019-03-13 NOTE — PATIENT INSTRUCTIONS
Preventive Health Recommendations    See your health care provider every year to    Review health changes.     Discuss preventive care.      Review your medicines if your doctor has prescribed any.      You no longer need a yearly Pap test unless you've had an abnormal Pap test in the past 10 years. If you have vaginal symptoms, such as bleeding or discharge, be sure to talk with your provider about a Pap test.      Every 1 to 2 years, have a mammogram.  If you are over 69, talk with your health care provider about whether or not you want to continue having screening mammograms.      Every 10 years, have a colonoscopy. Or, have a yearly FIT test (stool test). These exams will check for colon cancer.       Have a cholesterol test every 5 years, or more often if your doctor advises it.       Have a diabetes test (fasting glucose) every three years. If you are at risk for diabetes, you should have this test more often.       At age 65, have a bone density scan (DEXA) to check for osteoporosis (brittle bone disease).    Shots:    Get a flu shot each year.    Get a tetanus shot every 10 years.    Talk to your doctor about your pneumonia vaccines. There are now two you should receive - Pneumovax (PPSV 23) and Prevnar (PCV 13).    Talk to your pharmacist about the shingles vaccine.    Talk to your doctor about the hepatitis B vaccine.    Nutrition:     Eat at least 5 servings of fruits and vegetables each day.      Eat whole-grain bread, whole-wheat pasta and brown rice instead of white grains and rice.      Get adequate Calcium and Vitamin D.     Lifestyle    Exercise at least 150 minutes a week (30 minutes a day, 5 days a week). This will help you control your weight and prevent disease.      Limit alcohol to one drink per day.      No smoking.       Wear sunscreen to prevent skin cancer.       See your dentist twice a year for an exam and cleaning.      See your eye doctor every 1 to 2 years to screen for conditions  such as glaucoma, macular degeneration and cataracts.    Personalized Prevention Plan  You are due for the preventive services outlined below.  Your care team is available to assist you in scheduling these services.  If you have already completed any of these items, please share that information with your care team to update in your medical record.  Health Maintenance Due   Topic Date Due     Zoster (Shingles) Vaccine (1 of 2) 12/15/2002     Depression Assessment 2 - yearly  02/28/2017     Discuss Advance Directive Planning  10/03/2017     Annual Wellness Visit  12/15/2017     Pap Smear (diagnostic) - every 6 months  03/04/2019     Colposcopy - every 6 months  03/04/2019     FALL RISK ASSESSMENT  04/04/2019

## 2019-03-14 ENCOUNTER — MYC MEDICAL ADVICE (OUTPATIENT)
Dept: FAMILY MEDICINE | Facility: CLINIC | Age: 69
End: 2019-03-14

## 2019-03-20 DIAGNOSIS — I15.1 HYPERTENSION SECONDARY TO OTHER RENAL DISORDERS: ICD-10-CM

## 2019-03-20 DIAGNOSIS — Z94.0 KIDNEY REPLACED BY TRANSPLANT: ICD-10-CM

## 2019-03-20 RX ORDER — PREDNISONE 5 MG/1
TABLET ORAL
Qty: 90 TABLET | Refills: 3 | Status: SHIPPED | OUTPATIENT
Start: 2019-03-20 | End: 2020-03-18

## 2019-03-21 RX ORDER — METOPROLOL TARTRATE 100 MG
TABLET ORAL
Qty: 180 TABLET | Refills: 1 | Status: SHIPPED | OUTPATIENT
Start: 2019-03-21 | End: 2020-03-18

## 2019-03-21 NOTE — TELEPHONE ENCOUNTER
"Prescription approved per [facility] Refill Protocol.  Dannielle DSOUZA RN    Requested Prescriptions   Pending Prescriptions Disp Refills     metoprolol tartrate (LOPRESSOR) 100 MG tablet [Pharmacy Med Name: METOPROLOL TARTRATE 100MG TABS] 60 tablet 5     Sig: TAKE ONE TABLET BY MOUTH TWICE A DAY    Beta-Blockers Protocol Passed - 3/20/2019 12:21 PM       Passed - Blood pressure under 140/90 in past 12 months    BP Readings from Last 3 Encounters:   03/13/19 135/78   02/18/19 121/74   02/05/19 162/80                Passed - Patient is age 6 or older       Passed - Recent (12 mo) or future (30 days) visit within the authorizing provider's specialty    Patient had office visit in the last 12 months or has a visit in the next 30 days with authorizing provider or within the authorizing provider's specialty.  See \"Patient Info\" tab in inbasket, or \"Choose Columns\" in Meds & Orders section of the refill encounter.             Passed - Medication is active on med list            "

## 2019-04-09 ENCOUNTER — OFFICE VISIT (OUTPATIENT)
Dept: ONCOLOGY | Facility: CLINIC | Age: 69
End: 2019-04-09
Attending: OBSTETRICS & GYNECOLOGY
Payer: COMMERCIAL

## 2019-04-09 VITALS
SYSTOLIC BLOOD PRESSURE: 150 MMHG | HEART RATE: 78 BPM | WEIGHT: 101 LBS | TEMPERATURE: 97.9 F | DIASTOLIC BLOOD PRESSURE: 81 MMHG | BODY MASS INDEX: 18.47 KG/M2 | OXYGEN SATURATION: 98 %

## 2019-04-09 DIAGNOSIS — N89.3 VAGINAL DYSPLASIA: ICD-10-CM

## 2019-04-09 DIAGNOSIS — N87.9 CERVICAL DYSPLASIA: ICD-10-CM

## 2019-04-09 DIAGNOSIS — D07.1 VIN III (VULVAR INTRAEPITHELIAL NEOPLASIA III): Primary | ICD-10-CM

## 2019-04-09 PROCEDURE — 88175 CYTOPATH C/V AUTO FLUID REDO: CPT | Performed by: OBSTETRICS & GYNECOLOGY

## 2019-04-09 PROCEDURE — G0463 HOSPITAL OUTPT CLINIC VISIT: HCPCS | Mod: ZF

## 2019-04-09 PROCEDURE — 99207 ZZC NO DOCUMENTATION ON VISIT: CPT | Mod: ZP

## 2019-04-09 PROCEDURE — 57420 EXAM OF VAGINA W/SCOPE: CPT | Mod: ZF

## 2019-04-09 PROCEDURE — 87624 HPV HI-RISK TYP POOLED RSLT: CPT | Performed by: OBSTETRICS & GYNECOLOGY

## 2019-04-09 PROCEDURE — G0476 HPV COMBO ASSAY CA SCREEN: HCPCS | Performed by: OBSTETRICS & GYNECOLOGY

## 2019-04-09 ASSESSMENT — ENCOUNTER SYMPTOMS
BLOOD IN STOOL: 0
POLYDIPSIA: 0
SLEEP DISTURBANCES DUE TO BREATHING: 0
WEIGHT GAIN: 0
BLOATING: 1
HYPOTENSION: 0
ORTHOPNEA: 0
FEVER: 0
DIARRHEA: 1
SYNCOPE: 0
CHILLS: 0
RECTAL PAIN: 0
NAUSEA: 0
VOMITING: 0
INCREASED ENERGY: 0
JAUNDICE: 0
LEG PAIN: 1
NIGHT SWEATS: 0
FATIGUE: 1
HALLUCINATIONS: 0
DECREASED APPETITE: 0
EXERCISE INTOLERANCE: 0
LIGHT-HEADEDNESS: 0
HYPERTENSION: 1
POLYPHAGIA: 0
CONSTIPATION: 0
HEARTBURN: 1
WEIGHT LOSS: 0
PALPITATIONS: 0
ABDOMINAL PAIN: 0
ALTERED TEMPERATURE REGULATION: 0
BOWEL INCONTINENCE: 1

## 2019-04-09 ASSESSMENT — PAIN SCALES - GENERAL: PAINLEVEL: NO PAIN (0)

## 2019-04-09 NOTE — PATIENT INSTRUCTIONS
Pap smear done today.  You will be contacted with recs for follow-up    Pati Garcia MD  Gynecologic Oncology  [facility]

## 2019-04-09 NOTE — LETTER
4/9/2019       RE: Maribel Yang  [address]  [facility] [id number]     Dear Colleague,    Thank you for referring your patient, Maribel Yang, to the [facility]. Please see a copy of my visit note below.    .  Answers for HPI/ROS submitted by the patient on 4/9/2019   General Symptoms: Yes  Skin Symptoms: No  HENT Symptoms: No  EYE SYMPTOMS: No  HEART SYMPTOMS: Yes  LUNG SYMPTOMS: No  INTESTINAL SYMPTOMS: Yes  URINARY SYMPTOMS: No  GYNECOLOGIC SYMPTOMS: No  BREAST SYMPTOMS: No  SKELETAL SYMPTOMS: No  BLOOD SYMPTOMS: No  NERVOUS SYSTEM SYMPTOMS: No  MENTAL HEALTH SYMPTOMS: No  Fever: No  Loss of appetite: No  Weight loss: No  Weight gain: No  Fatigue: Yes  Night sweats: No  Chills: No  Increased stress: No  Excessive hunger: No  Excessive thirst: No  Feeling hot or cold when others believe the temperature is normal: No  Loss of height: No  Post-operative complications: No  Surgical site pain: No  Hallucinations: No  Change in or Loss of Energy: No  Hyperactivity: No  Confusion: No  Chest pain or pressure: No  Fast or irregular heartbeat: No  Pain in legs with walking: Yes  Trouble breathing while lying down: No  Fingers or toes appear blue: No  High blood pressure: Yes  Low blood pressure: No  Fainting: No  Murmurs: No  Pacemaker: No  Varicose veins: No  Edema or swelling: Yes  Wake up at night with shortness of breath: No  Light-headedness: No  Exercise intolerance: No  Heart burn or indigestion: Yes  Nausea: No  Vomiting: No  Abdominal pain: No  Bloating: Yes  Constipation: No  Diarrhea: Yes  Blood in stool: No  Black stools: No  Rectal or Anal pain: No  Fecal incontinence: Yes  Yellowing of skin or eyes: No  Vomit with blood: No  Change in stools: No      Again, thank you for allowing me to participate in the care of your patient.      Sincerely,     GYN ONC Colposcopy Proivder

## 2019-04-09 NOTE — NURSING NOTE
"Oncology Rooming Note    April 9, 2019 12:57 PM   Maribel Yang is a 68 year old female who presents for:    Chief Complaint   Patient presents with     Oncology Clinic Visit     Colposcopy Clinic, return     Initial Vitals: /81   Pulse 78   Temp 97.9  F (36.6  C) (Oral)   Wt 45.8 kg (101 lb)   SpO2 98%   BMI 18.47 kg/m   Estimated body mass index is 18.47 kg/m  as calculated from the following:    Height as of 3/13/19: 1.575 m (5' 2\").    Weight as of this encounter: 45.8 kg (101 lb). Body surface area is 1.42 meters squared.  No Pain (0) Comment: Data Unavailable   No LMP recorded. Patient is postmenopausal.  Allergies reviewed: Yes  Medications reviewed: Yes    Medications: Medication refills not needed today.  Pharmacy name entered into [facility]:    [facility]  [facility] - [place], [place] - [address] [place] [address] PHARMACY [facility] - [place], [place] - [phone]    Clinical concerns: Patient denies pelvic and vaginal pain at today's visit.         Marguerite Gilbert CMA              "

## 2019-04-11 NOTE — TELEPHONE ENCOUNTER
FUTURE VISIT INFORMATION      FUTURE VISIT INFORMATION:    Date: 5/6/19    Time: 1:40AM    Location: [facility]  REFERRAL INFORMATION:    Referring provider:  Dr. Lisa Martinez    Referring providers clinic:   [facility]    Reason for visit/diagnosis:  Chronic Diarrhea     NOTES STATUS DETAILS   OFFICE NOTE from referring provider  Internal 9/6/17, 6/23/17   OFFICE NOTE from other specialist   Internal 2/18/19, 10/30/18, 9/11/18, 8/13/18, 4/24/18, 4/9/18...   DISCHARGE SUMMARY FROM HOSPITAL N/A    DISCHARGE REPORT FROM ED N/A    OPERATIVE REPORT  Internal    PFC REPORT N/A    MEDICATION LIST Internal    LABS     FIT/STOOL TESTING Internal    PERTINENT LABS Internal    PATHOLOGY REPORTS RELATED TO DIAGNOSIS Internal 8/9/17   DIAGNOSTIC PROCEDURES     COLONOSCOPY Internal/Received 8/9/17, 8/14/13   ENDOSCOPY (EGD) N/A    ERCP N/A    EUS N/A    FLEX SIGMOIDOSCOPY N/A    IMAGING & REPORT      CT, MRI, US, XR Internal

## 2019-04-12 LAB
COPATH REPORT: NORMAL
PAP: NORMAL

## 2019-04-17 ENCOUNTER — TELEPHONE (OUTPATIENT)
Dept: TRANSPLANT | Facility: CLINIC | Age: 69
End: 2019-04-17

## 2019-04-17 ENCOUNTER — TEAM CONFERENCE (OUTPATIENT)
Dept: TRANSPLANT | Facility: CLINIC | Age: 69
End: 2019-04-17

## 2019-04-17 NOTE — TELEPHONE ENCOUNTER
TEAM CONFERENCE:     ATTENDEES: [place], [place], [place], [place], [place], [place], Coordinators, Social Work, Pharmacy, Finance, and Dietary      OUTCOME: Pt removed from kidney transplant waitlist d/t stable, high kidney function.

## 2019-04-17 NOTE — LETTER
April 17, 2019    Maribel Yang  [address]  [facility] [address]      Dear Ms. Yang,    The purpose of this letter is to let you know the [facility] Multi-Disciplinary Selection Team made the decision to remove you from the kidney transplant list.  This is because your kidney function is too high. Our team does not anticipate you needing a kidney in the future. Please call with any questions you may have.  Important things you should know:    If you would like to discuss the decision, or if your medical status changes, you may call [phone] and ask to speak to your transplant coordinator.    We recommend that you continue to follow up with your primary care and referring physicians in order to manage your health concerns.  Enclosed is a letter from [employer] which describes the services offered to patients by [employer] and the [facility].  Thank you for allowing us to participate in your care.  We wish you well.    Sincerely,    Kidney Transplant Team  Enclosure:  [employer] Letter     CC:   Hitesh See MD;  Joel Davis MD;  Lisa Martinez DO

## 2019-04-17 NOTE — TELEPHONE ENCOUNTER
Coordinator called pt and left msg. Reviewed pt's kidney status and function with Dr. See and it is very unlikely you will need transplant in the future. Since your kidney function is too well, it is recommended you are removed from our waitlist. In the event in the future if you need a kidney, your waiting time can be requested back. Encouraged pt to call with any questions, contact information provided.

## 2019-04-26 ENCOUNTER — OFFICE VISIT (OUTPATIENT)
Dept: NEPHROLOGY | Facility: CLINIC | Age: 69
End: 2019-04-26
Payer: COMMERCIAL

## 2019-04-26 VITALS
WEIGHT: 99.2 LBS | HEIGHT: 62 IN | DIASTOLIC BLOOD PRESSURE: 80 MMHG | HEART RATE: 82 BPM | TEMPERATURE: 98.7 F | SYSTOLIC BLOOD PRESSURE: 154 MMHG | BODY MASS INDEX: 18.26 KG/M2

## 2019-04-26 DIAGNOSIS — D84.9 IMMUNOSUPPRESSION (H): ICD-10-CM

## 2019-04-26 DIAGNOSIS — B27.00 EBV (EPSTEIN-BARR VIRUS) VIREMIA: ICD-10-CM

## 2019-04-26 DIAGNOSIS — E87.6 HYPOKALEMIA: ICD-10-CM

## 2019-04-26 DIAGNOSIS — Z79.899 ENCOUNTER FOR LONG-TERM (CURRENT) USE OF MEDICATIONS: ICD-10-CM

## 2019-04-26 DIAGNOSIS — R19.7 DIARRHEA, UNSPECIFIED TYPE: ICD-10-CM

## 2019-04-26 DIAGNOSIS — Z94.0 HTN, KIDNEY TRANSPLANT RELATED: ICD-10-CM

## 2019-04-26 DIAGNOSIS — Z94.0 STATUS POST KIDNEY TRANSPLANT: Primary | ICD-10-CM

## 2019-04-26 DIAGNOSIS — R80.9 PROTEINURIA, UNSPECIFIED TYPE: ICD-10-CM

## 2019-04-26 DIAGNOSIS — I15.1 HTN, KIDNEY TRANSPLANT RELATED: ICD-10-CM

## 2019-04-26 DIAGNOSIS — C21.1 MALIGNANT NEOPLASM OF ANAL CANAL (H): ICD-10-CM

## 2019-04-26 DIAGNOSIS — Z94.0 KIDNEY REPLACED BY TRANSPLANT: ICD-10-CM

## 2019-04-26 LAB
ALBUMIN SERPL-MCNC: 3.1 G/DL (ref 3.4–5)
ALP SERPL-CCNC: 118 U/L (ref 40–150)
ALT SERPL W P-5'-P-CCNC: 19 U/L (ref 0–50)
ANION GAP SERPL CALCULATED.3IONS-SCNC: 9 MMOL/L (ref 3–14)
AST SERPL W P-5'-P-CCNC: 14 U/L (ref 0–45)
BASOPHILS # BLD AUTO: 0 10E9/L (ref 0–0.2)
BASOPHILS NFR BLD AUTO: 0.3 %
BILIRUB SERPL-MCNC: 0.2 MG/DL (ref 0.2–1.3)
BUN SERPL-MCNC: 25 MG/DL (ref 7–30)
CALCIUM SERPL-MCNC: 9 MG/DL (ref 8.5–10.1)
CHLORIDE SERPL-SCNC: 106 MMOL/L (ref 94–109)
CO2 SERPL-SCNC: 24 MMOL/L (ref 20–32)
CREAT SERPL-MCNC: 1.41 MG/DL (ref 0.52–1.04)
DIFFERENTIAL METHOD BLD: ABNORMAL
EOSINOPHIL # BLD AUTO: 0 10E9/L (ref 0–0.7)
EOSINOPHIL NFR BLD AUTO: 0.4 %
ERYTHROCYTE [DISTWIDTH] IN BLOOD BY AUTOMATED COUNT: 14.6 % (ref 10–15)
GFR SERPL CREATININE-BSD FRML MDRD: 39 ML/MIN/{1.73_M2}
GLUCOSE SERPL-MCNC: 173 MG/DL (ref 70–99)
HCT VFR BLD AUTO: 40.7 % (ref 35–47)
HGB BLD-MCNC: 13.1 G/DL (ref 11.7–15.7)
IMM GRANULOCYTES # BLD: 0 10E9/L (ref 0–0.4)
IMM GRANULOCYTES NFR BLD: 0.1 %
LYMPHOCYTES # BLD AUTO: 0.2 10E9/L (ref 0.8–5.3)
LYMPHOCYTES NFR BLD AUTO: 2.7 %
MAGNESIUM SERPL-MCNC: 2.2 MG/DL (ref 1.6–2.3)
MCH RBC QN AUTO: 28.9 PG (ref 26.5–33)
MCHC RBC AUTO-ENTMCNC: 32.2 G/DL (ref 31.5–36.5)
MCV RBC AUTO: 90 FL (ref 78–100)
MONOCYTES # BLD AUTO: 0.4 10E9/L (ref 0–1.3)
MONOCYTES NFR BLD AUTO: 5.1 %
NEUTROPHILS # BLD AUTO: 6.4 10E9/L (ref 1.6–8.3)
NEUTROPHILS NFR BLD AUTO: 91.4 %
NRBC # BLD AUTO: 0 10*3/UL
NRBC BLD AUTO-RTO: 0 /100
PLATELET # BLD AUTO: 215 10E9/L (ref 150–450)
POTASSIUM SERPL-SCNC: 3.2 MMOL/L (ref 3.4–5.3)
PROT SERPL-MCNC: 7 G/DL (ref 6.8–8.8)
RBC # BLD AUTO: 4.53 10E12/L (ref 3.8–5.2)
SIROLIMUS BLD-MCNC: 3.9 UG/L (ref 5–15)
SODIUM SERPL-SCNC: 140 MMOL/L (ref 133–144)
TME LAST DOSE: ABNORMAL H
WBC # BLD AUTO: 7 10E9/L (ref 4–11)

## 2019-04-26 PROCEDURE — 80195 ASSAY OF SIROLIMUS: CPT | Performed by: INTERNAL MEDICINE

## 2019-04-26 PROCEDURE — 36415 COLL VENOUS BLD VENIPUNCTURE: CPT | Performed by: INTERNAL MEDICINE

## 2019-04-26 PROCEDURE — G0463 HOSPITAL OUTPT CLINIC VISIT: HCPCS | Mod: ZF

## 2019-04-26 PROCEDURE — 83735 ASSAY OF MAGNESIUM: CPT | Performed by: INTERNAL MEDICINE

## 2019-04-26 ASSESSMENT — MIFFLIN-ST. JEOR: SCORE: 943.22

## 2019-04-26 ASSESSMENT — PAIN SCALES - GENERAL: PAINLEVEL: NO PAIN (0)

## 2019-04-26 NOTE — NURSING NOTE
"/80   Pulse 82   Temp 98.7  F (37.1  C) (Oral)   Ht 1.575 m (5' 2\")   Wt 45 kg (99 lb 3.2 oz)   BMI 18.14 kg/m    Chief Complaint   Patient presents with     RECHECK     follow up with kidney transplant, [facility] cma       "

## 2019-04-26 NOTE — NURSING NOTE
Maribel Yang was seen today in clinic by this writer. Medications, lab orders, lab frequency, and necessary follow up discussed with patient. Patient was provided with a copy of the current lab letter. Patient voiced understanding and agreement of education and plan.   To collect fecal elastase to determine if there is pancreatic insufficiency. States she has had chronic diarrhea for 5 years. She has a GI appt coming up next month.     Radha Cooper

## 2019-04-26 NOTE — PROGRESS NOTES
ACUTE TRANSPLANT NEPHROLOGY VISIT    Assessment & Plan   # LDKT: baseline Cr ~ 1.3-1.6; Stable   - Proteinuria: Moderate 1g/g checked 4/2018    - Date DSA Last Checked: 4/2018 Latest DSA: No   - BK Viremia: No   - Kidney Tx Biopsy: 2005, 2006 , suspicious for mild tubulointerstitial Chronic rejection, mild to moderate treated with steroid thymo 4 doses, solumedrol x3 doeses and OKT3 x2 doses.   - graft function stable, she was removed from transplant list   - cont same IS   - will recheck UPCR      # Immunosuppression: Prednisone (dose 5 mg daily) and Sirolimus (goal 3-5)   - Changes: No on lower IS because of multiple cancer     # Prophylaxis:   - PJP: None   - CMV: None   - Thrush: None    # Hypertension: Controlled; Goal BP: < 140/90   - Volume status: Euvolemic   - Changes: No her BP is controlled at home, cont same medication now no change    # Anemia in Chronic Renal Disease: Hgb: Stable   - Iron studies: Not checked recently    # Mineral Bone Disorder:   - Secondary renal hyperparathyroidism; PTH level is: Not checked recently  - Vitamin D; level is: Not checked recently  - Calcium; level is: Normal  - Phosphorus; level is: Not checked recently, but was normal last check    # Electrolytes:   - Potassium; level: Low Hypokalemia potassium 3.2 asked to eat more potassium food and will check her Mg level ,   - Magnesium; level: Not checked recently, but was normal last check will recheck because of hypokalemia  - Bicarbonate; level: Normal  - Sodium; level: Normal    # EBV viremia: last checked 2/2019 7769, log 3.9 cont follow up in lower IS    # Diarrhea: chronic, colonoscopy unremarkable will check stool pancreatic elastase     # Skin Cancer:   - New lesions: some   - Discussed sun protection and recommend regular follow up with Dermatology    # Medical Compliance: Yes    # Transplant History:  Etiology of kidney failure: Alport disease  Tx: LDKT  Transplant: 9/20/2004 (Kidney)  Donor Type: Living      Donor  Class: Standard Criteria Donor  History of BK viremia: No  History of CMV viremia: No  History of EBV viremia: Yes  Significant changes in immunosuppression: None  Significant transplant-related complications: EBV viremia    Transplant Office Phone Number: [phone]    Assessment and plan was discussed with the patient and she voiced her understanding and agreement.    Return visit: Return in about 1 year (around 4/26/2020).    Marlon Morales MD   Patient seen and discussed  with Dr. Sanders  Nephrology Fellow  Pager: [phone]      Chief Complaint   Ms. Yang is a 68 year old here for routine follow up    History of Present Illness     Ms Yang is a 68 year old female with history of Alport syndrome s/p LDKT 2004 that get complicated by rejections between 6970-8731, she was treated with thymo, solumedrol and OKT3 but her CR remain in the higher 2 and was re listed for kidney transplant but her listing removed 2008 because she recover and GFR improved. She also get complicated by EBV viremia and recurrent cancer lung , anal , cervix and skin, her IS changed to only sirolimus and prednisone.     Since her last visit , she feels fine no specific complaint except cont to have diarrhea daily she use imodium daily, her diarrhea is chronic for 5 years, other than that her appetite is good, no nausea or vomiting, energy level is ok and no limitation to her activity, no fever or chills no other complaint.       Recent Hospitalizations:  [x] No [] Yes    New Medical Issues: [x] No [] Yes    Decreased energy: [x] No [] Yes    Chest pain or SOB with exertion:  [x] No [] Yes    Appetite change or weight change: [x] No [] Yes    Nausea, vomiting or diarrhea:  [] No [x] Yes Chronic diarrhea    Fever, sweats or chills: [x] No [] Yes    Leg swelling: [] No [x] Yes Mild      Other medical issues:  No    Home BP: 130-135/70-78    Review of Systems   A comprehensive review of systems was obtained and negative, except as  noted in the HPI or PMH.    Problem List   Patient Active Problem List   Diagnosis     Other specified congenital anomalies     Kidney replaced by transplant     S/P LEEP of cervix     CARDIOVASCULAR SCREENING; LDL GOAL LESS THAN 100     Immunosuppression (H)     HTN, kidney transplant related     History of basal cell carcinoma     YUNIOR III (vulvar intraepithelial neoplasia III)     Peripheral artery disease (H)     Squamous cell lung cancer (H)     Lumbago     Vaginal dysplasia     Alopecia     Cherry angioma     Skin cancer screening     Intertrigo     History of basal cell carcinoma     Malignant neoplasm of anal canal (H)     Aftercare following organ transplant       Social History   Social History     Tobacco Use     Smoking status: Former Smoker     Packs/day: 0.30     Years: 35.00     Pack years: 10.50     Types: Cigarettes     Last attempt to quit: 2014     Years since quittin.2     Smokeless tobacco: Never Used     Tobacco comment: occ cig   Substance Use Topics     Alcohol use: Yes     Alcohol/week: 0.0 oz     Comment: rare     Drug use: No       Allergies   Allergies   Allergen Reactions     Ultracet Nausea and Vomiting and Hives     Fentanyl Nausea     Has had since she initially had the issues with nausea and tolerated it OK.      Hydrocodone Nausea and Vomiting and Hives       Medications   Current Outpatient Medications   Medication Sig     ACETAMINOPHEN PO Take 1-2 tablets by mouth every 8 hours as needed for pain     acetaminophen-codeine (TYLENOL WITH CODEINE #3) 300-30 MG per tablet Take 1-2 tablets by mouth every 6 hours as needed for pain     amoxicillin (AMOXIL) 500 MG capsule Take 4 capsules (2,000 mg) by mouth once as needed Prior to dental procedures     atorvastatin (LIPITOR) 20 MG tablet Take 1 tablet (20 mg) by mouth daily (Patient taking differently: Take 20 mg by mouth every evening )     calcium carbonate 600 mg-vitamin D 400 units (CALTRATE) 600-400 MG-UNIT per tablet Take 1  "tablet by mouth 2 times daily     cilostazol (PLETAL) 100 MG tablet Take 1 tablet (100 mg) by mouth 2 times daily     clopidogrel (PLAVIX) 75 MG tablet Take 1 tablet (75 mg) by mouth daily     Lactobacillus-Inulin ([facility] DIGESTIVE HEALTH) CAPS Take 1 capsule by mouth daily     losartan (COZAAR) 25 MG tablet Take 1 tablet (25 mg) by mouth daily     metoprolol tartrate (LOPRESSOR) 100 MG tablet TAKE ONE TABLET BY MOUTH TWICE A DAY     NIFEdipine ER OSMOTIC (PROCARDIA XL) 90 MG 24 hr tablet TAKE ONE TABLET BY MOUTH EVERY DAY     ORDER FOR DME Equipment being ordered: TENS     predniSONE (DELTASONE) 5 MG tablet TAKE ONE TABLET BY MOUTH EVERY DAY     sirolimus (GENERIC EQUIVALENT) 1 MG tablet Take 2 tablets (2 mg) by mouth daily     No current facility-administered medications for this visit.      There are no discontinued medications.    Physical Exam   Vital Signs: /80   Pulse 82   Temp 98.7  F (37.1  C) (Oral)   Ht 1.575 m (5' 2\")   Wt 45 kg (99 lb 3.2 oz)   BMI 18.14 kg/m      GENERAL APPEARANCE: alert and no distress  HENT: mouth without ulcers or lesions  LYMPHATICS: no cervical or supraclavicular nodes  RESP: lungs clear to auscultation - no rales, rhonchi or wheezes  CV: regular rhythm, normal rate, no rub, no murmur  EDEMA: no LE edema bilaterally  ABDOMEN: soft, nondistended, nontender, bowel sounds normal  MS: extremities normal - no gross deformities noted, no evidence of inflammation in joints, no muscle tenderness  SKIN: no rash  TX KIDNEY: normal left side   DIALYSIS ACCESS:  None    Data     Renal Latest Ref Rng & Units 4/26/2019 2/15/2019 10/30/2018   Na 133 - 144 mmol/L 140 139 137   K 3.4 - 5.3 mmol/L 3.2(L) 3.6 3.8   Cl 94 - 109 mmol/L 106 107 104   CO2 20 - 32 mmol/L 24 26 26   BUN 7 - 30 mg/dL 25 21 17   Cr 0.52 - 1.04 mg/dL 1.41(H) 1.33(H) 1.38(H)   Glucose 70 - 99 mg/dL 173(H) 102(H) 115(H)   Ca  8.5 - 10.1 mg/dL 9.0 9.1 9.4   Mg 1.6 - 2.3 mg/dL 2.2 - -     Bone Health Latest Ref " Rng & Units 6/1/2009 7/24/2008 1/29/2008   Phos 2.5 - 4.5 mg/dL 3.4 - -   PTHi 12 - 72 pg/mL 75(H) 81(H) 73(H)     Heme Latest Ref Rng & Units 4/26/2019 2/15/2019 10/30/2018   WBC 4.0 - 11.0 10e9/L 7.0 5.1 6.5   Hgb 11.7 - 15.7 g/dL 13.1 13.9 14.1   Plt 150 - 450 10e9/L 215 255 249     Liver Latest Ref Rng & Units 4/26/2019 2/15/2019 10/30/2018   AP 40 - 150 U/L 118 121 123   TBili 0.2 - 1.3 mg/dL 0.2 0.2 0.3   ALT 0 - 50 U/L 19 18 16   AST 0 - 45 U/L 14 16 19   Tot Protein 6.8 - 8.8 g/dL 7.0 7.7 7.8   Albumin 3.4 - 5.0 g/dL 3.1(L) 3.4 3.3(L)     Pancreas Latest Ref Rng & Units 10/23/2009   Lipase 20 - 250 U/L 56     Iron studies Latest Ref Rng & Units 8/22/2008 3/8/2007 7/6/2004   Iron 35 - 180 ug/dL 68 106 -   Ferritin 10 - 300 ng/mL 142 127 39     [facility] Txp Virology Latest Ref Rng & Units 4/24/2018 12/15/2009 10/2/2009   CVM DNA Quant - Plasma - -   BK Spec - - Plasma, EDTA anticoagulant Plasma, EDTA anticoagulant   BK Res <1000 copies/mL - <1000 <1000   BK Log <3.0 Log copies/mL - <3.0 <3.0            Recent Labs   Lab Test 04/11/13  0859 04/24/13  1003 04/23/18  0905   DOSMPA 4/10/13     2100 Not Provided Not Provided   MPACID 5.26* 2.96 0.60*   MPAG 67.3 86.6 28.8*     Attestation:  This patient has been seen and evaluated by me, Rob Sanders MD.  I have reviewed the note and agree with plan of care as documented by the fellow.

## 2019-05-02 ENCOUNTER — TELEPHONE (OUTPATIENT)
Dept: GASTROENTEROLOGY | Facility: CLINIC | Age: 69
End: 2019-05-02

## 2019-05-05 ASSESSMENT — ENCOUNTER SYMPTOMS
RECTAL PAIN: 0
SLEEP DISTURBANCES DUE TO BREATHING: 0
HYPERTENSION: 1
BLOATING: 1
BLOOD IN STOOL: 0
JAUNDICE: 0
LEG PAIN: 1
HEARTBURN: 1
LIGHT-HEADEDNESS: 0
ABDOMINAL PAIN: 1
ORTHOPNEA: 0
PALPITATIONS: 0
HYPOTENSION: 0
CONSTIPATION: 0
DIARRHEA: 1
BRUISES/BLEEDS EASILY: 1
SWOLLEN GLANDS: 0
SYNCOPE: 0
VOMITING: 0
BOWEL INCONTINENCE: 1
NAUSEA: 1
EXERCISE INTOLERANCE: 0

## 2019-05-06 ENCOUNTER — PRE VISIT (OUTPATIENT)
Dept: GASTROENTEROLOGY | Facility: CLINIC | Age: 69
End: 2019-05-06

## 2019-05-06 ENCOUNTER — OFFICE VISIT (OUTPATIENT)
Dept: GASTROENTEROLOGY | Facility: CLINIC | Age: 69
End: 2019-05-06
Payer: COMMERCIAL

## 2019-05-06 VITALS
DIASTOLIC BLOOD PRESSURE: 76 MMHG | HEIGHT: 63 IN | HEART RATE: 80 BPM | BODY MASS INDEX: 17.26 KG/M2 | WEIGHT: 97.4 LBS | TEMPERATURE: 98.2 F | RESPIRATION RATE: 16 BRPM | SYSTOLIC BLOOD PRESSURE: 142 MMHG | OXYGEN SATURATION: 94 %

## 2019-05-06 DIAGNOSIS — R62.7 FAILURE TO THRIVE IN ADULT: ICD-10-CM

## 2019-05-06 DIAGNOSIS — K52.9 CHRONIC DIARRHEA: Primary | ICD-10-CM

## 2019-05-06 DIAGNOSIS — Z94.0 KIDNEY REPLACED BY TRANSPLANT: ICD-10-CM

## 2019-05-06 DIAGNOSIS — Z79.899 POLYPHARMACY: ICD-10-CM

## 2019-05-06 SDOH — HEALTH STABILITY: MENTAL HEALTH: HOW OFTEN DO YOU HAVE 6 OR MORE DRINKS ON ONE OCCASION?: LESS THAN MONTHLY

## 2019-05-06 SDOH — HEALTH STABILITY: MENTAL HEALTH: HOW MANY STANDARD DRINKS CONTAINING ALCOHOL DO YOU HAVE ON A TYPICAL DAY?: 1 OR 2

## 2019-05-06 SDOH — HEALTH STABILITY: MENTAL HEALTH: HOW OFTEN DO YOU HAVE A DRINK CONTAINING ALCOHOL?: MONTHLY OR LESS

## 2019-05-06 ASSESSMENT — PAIN SCALES - GENERAL: PAINLEVEL: NO PAIN (0)

## 2019-05-06 ASSESSMENT — MIFFLIN-ST. JEOR: SCORE: 942.99

## 2019-05-06 NOTE — PATIENT INSTRUCTIONS
I've included a brief summary of our discussion and care plan from today's visit below.  Please review this information with your primary care provider.  _______________________________________________________________________      1. It was wonderful getting a chance to meet with you to discuss your Chronic Diarrhea, particularly in the complicated post-transplant + polypharmacy setting - discussed next steps in evaluation and management together today.  - Discussed diagnostic Colonoscopy 8/2017 findings, essentially normal without evidence of Microscopic Colitis or infection on colonic biopsies. No immediate need to repeat Colonoscopy at this time, can readdress if needed in ongoing care.  - Discussed your loperamide-responsive diarrhea which usually indicates a bothersome but benign diarrhea.  - Suspect multifactorial diarrhea in the complicated post-transplant + polypharmacy setting, recommend symptomatic management for now in the absence of other clinical alarm features.    2. Recommend powdered fiber supplementation with Benefiber or Citrucel (or Metamucil if tolerated), taken at least once daily to start, as the first step to help improve your stool consistency. Use this consistently for at least 2-3 months daily for best effect, particularly as this does take some time to work. Bloating is a common side effect of fiber supplementation; this usually gets progressively better after a few weeks if you're able to stick with it.  - May increase this in ongoing care.    3. Recommend trial of scheduled low-dose loperamide (OTC Imodium), 1tab first thing in the morning to start, and observing your response as we discussed today. This may help obtain better predictability/consistency with your stool pattern when used judiciously.   - You may gently increase or decrease the dose/frequency as needed depending on your response as we discussed today; will also review this further in your follow-up.   - Hold loperamide if  "constipation occurs, or if there are concerns for an acute infection (fevers/chills, similar symptoms in close contacts, \"food poisoning\" or \"stomach flu\" symptoms, etc.).  - If constipation occurs, consider using low-dose loperamide only 2-3 times/week to see if that helps to better balance the symptoms.  - Remember, any measures to slow down stools may cause some incidental bloating or increase risk for obstruction; this is expected, but pay attention in case you need to scale back your bowel regimen as we discussed today.    4. Recommend discontinuation of probiotic supplementation for now, discussed risks/precautions in the post-transplant/immunosuppressed setting.    5. Discussed your overall low weight in the setting of prior transplant, cancers, and altered nutritional intake due to persistent/unpredictable diarrhea.  - I would recommend keeping a food/symptom diary to review at next visit, particularly as this may help identify other dietary/stress/volume triggers for your symptoms. Please keep this for at least 3-5 days.  - Could consider role for FODMAP dietary profile education to help inform choices throughout the day (and \" work\" when symptoms arise).  - Dietary fiber intake through fresh fruit, vegetables, and whole grains is generally OK.   - Recommend Nutrition referral to help with calorie counts and to aid with oral supplementation as we get your diarrhea under better control.     6. Continue to monitor for the danger signs/symptoms we reviewed together today:  worsening abdominal pain, worsening diarrhea, bowel/bladder obstructive symptoms (nausea/vomiting, abdominal distention, difficulty passing stool/flatus/urine), blood mixed into stools, persistent fevers/chills, progressive anemia (particularly with iron deficiency), difficulty with swallowing, perianal/rectal discomfort (particularly with defecation), unexpected weight loss, etc.  - Contact us via Vivevet or phone should these " symptoms occur, particularly as we may advise further evaluation accordingly.    7. Return to GI Clinic with my GI Physician Assistants (Marquise or Lizett) in 3-4 months to review your progress, sooner if symptomatic.   - If you are unable to schedule this follow-up appointment today, please contact our  at [phone] within the next week to help set up this necessary appointment.    _______________________________________________________________________    It was a pleasure seeing you in clinic today - please be in touch if there are any further questions that arise following today's visit.  During business hours, you may reach Clinic Nurse Triage Line at [phone].  For urgent/emergent questions after business hours, you may reach the on-call GI Fellow by contacting the [facility]  at [phone].    Any benign/non-urgent test results are usually communicated via letter or makemojihart message within 1-2 weeks after completion.  Urgent results (those that require a change in the previously-discussed care plan) are usually communicated via a phone call once available from our clinic staff to discuss the results and the next steps in your evaluation.    I recommend signing up for [employer] access if you have not already done so and are comfortable with using a computer.  This allows for online access to your lab results and also helps you communicate efficiently with my clinic should any questions arise in your care.    We have Financial Counseling services available through our clinic.  If you have questions about your insurance coverage or payment responsibilities, particularly before you undergo any tests or procedures, please let us know and we can arrange a consultation accordingly to help you make informed decisions about your healthcare.    Sincerely,    Sushil Crisostomo MD    [facility] - Department of Medicine  Division of Gastroenterology

## 2019-05-06 NOTE — TELEPHONE ENCOUNTER
RECORDS RECEIVED FROM: Internal - [facility]   DATE RECEIVED: 5/7/19   NOTES (FOR ALL VISITS) STATUS DETAILS   OFFICE NOTE from referring provider Internal 3/13/19   OFFICE NOTE from other specialist N/A    OPERATIVE REPORT (SURGICAL/PATH REPORTS) N/A    MEDICATION LIST Internal    IMAGING (FOR ALL VISITS)     DEXA SCAN [facility]:  DX Hip/Pelvis/Spine 2/27/19   MRI (BRAIN) N/A    CT (HEAD/NECK/ABDOMEN/CHEST) [facility]:  CT Chest 2/15/19  CT Chest 8/10/18   XRAY (HEAD/NECK/ABDOMEN/CHEST) Internal [facility]:  XR Ribs and Chest 1/25/17   ULTRASOUND (HEAD/NECK/THYROID) N/A    LABS     DIABETES: HBGA1C, CREATININE, FASTING LIPIDS, MICROALBUMIN URINE, POTASSIUM, TSH,T4    THYROID: TSH, T4, CBC, THYROGLONULIN, TOTAL T3, FREE T4, CALCITONIN, CEA Internal 4/26/19

## 2019-05-06 NOTE — NURSING NOTE
"Chief Complaint   Patient presents with     Consult     Chronic Diarrhea       Vitals:    05/06/19 1335   BP: 142/76   BP Location: Left arm   Patient Position: Sitting   Cuff Size: Adult Small   Pulse: 80   Resp: 16   Temp: 98.2  F (36.8  C)   TempSrc: Oral   SpO2: 94%   Weight: 44.2 kg (97 lb 6.4 oz)   Height: 1.588 m (5' 2.5\")       Body mass index is 17.53 kg/m .      Noelle Clark LPN                          "

## 2019-05-06 NOTE — LETTER
"5/6/2019       RE: Maribel Yang  [address]  [facility] [address]     Dear Colleague,    Thank you for referring your patient, Maribel Yang, to the [facility] at [facility]. Please see a copy of my visit note below.    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 Reports having 3 variable consistency stools/day w/o blood on \"good days\", \"constant\" loose watery stools on \"bad days\" (responsive to 2-3 sequential doses of standard OTC Imodium when used). Denies any tenesmus or insecurity passing flatus, no fecal incontinence or new perianal/nocturnal sxs noted. Denies any overt obstructive sxs at this time, +ongoing passage of stool/flatus. Denies any N/V/F/C/HA/NS or other const/syst/cardiopulmonary sxs, no BRB/melena in stool or overt urinary changes, +persistent low wt over the last 2-4yrs (likely combination of comorbid illness/treatment and avoidant of PO intake d/t unmitigated diarrhea per d/w pt today). No other bowel/bladder habit changes, no dysphagia/odynophagia. No jaundice/icterus/pruritus, no acholic stools/steatorrhea, no new lumps/bumps, no jt pain/oral ulcer/rash/eye sxs noted.    ROS: 10pt ROS performed and otherwise negative.    PERTINENT PAST MEDICAL/SURGICAL HISTORY:  As " noted above.    PERTINENT MEDICATIONS:  - sirolimus  - PDN  - Anticoagulation/Antiplatelet Agents: Pletal, Plavix  Medications reviewed with patient today, see Medication List/Assessment for details.  No other NSAID/anticoagulation reported by patient.  No other OTC/herbal/supplements reported by patient.    SOCIAL HISTORY: Tobacco: +ongoing intermittent cigarette use (1cig/day). +occ THC use, denies any etoh or other rd/ivda.    FAMILY HISTORY:  Sister w/ cervical CA, niece w/ breast CA. Daughter w/ RA. No colon/panc/esophageal/other GI CA, no other Zuniga or other HPS-related Mook. No IBD/celiac, no other AI/liver/thyroid disease. No known FH bleeding/clotting disorders.    PHYSICAL EXAMINATION:  Vitals reviewed, AFVSS  Wt 97# today (sl decr ~4# since 4/9/19)  Gen: aaox3, cooperative, pleasant, not diaphoretic, nad  HEENT: ncat, neck supple, no clad/sclad, normal op w/o ulcer/exudate, anicteric, mmm  Resp/CV without acute findings, not dyspneic/tachycardic  Abd: +nabs, soft, nt, nd, no peritoneal s/s noted. No ecchymoses, +L-sided angular surgical scar w/ palpable LLQ transplanted kidney, no CVA/spinal tenderness noted.  Ext: no c/c/e  Skin: warm, perfused, no jaundice  Neuro: grossly intact, no asterixis noted    PERTINENT STUDIES:  Surgical Pathology 8/9/17  A. TERMINAL ILEUM NORMAL BIOPSY,:   - Small bowel mucosa with no significant histologic abnormality     B. COLON, RANDOM, BIOPSY:   - Colonic mucosa with no significant histologic abnormality   - No evidence of microscopic colitis or mycophenolate toxicity     C. SIGMOID COLON POLYP, BIOPSY:   - Hyperplastic polyp     ASSESSMENT/PLAN:    1. Chronic Diarrhea, particularly in the complicated post-transplant + polypharmacy setting, chronic EBV viremia, chronic low weight in the post-transplant/multi-comorbidity setting - discussed next steps in evaluation and management together today  1. It was wonderful getting a chance to meet with you to discuss your Chronic  "Diarrhea, particularly in the complicated post-transplant + polypharmacy setting - discussed next steps in evaluation and management together today.  - Discussed diagnostic Colonoscopy 8/2017 findings, essentially normal without evidence of Microscopic Colitis or infection on colonic biopsies. No immediate need to repeat Colonoscopy at this time, can readdress if needed in ongoing care.  - Discussed your loperamide-responsive diarrhea which usually indicates a bothersome but benign diarrhea.  - Suspect multifactorial diarrhea in the complicated post-transplant + polypharmacy setting, recommend symptomatic management for now in the absence of other clinical alarm features.    2. Recommend powdered fiber supplementation with Benefiber or Citrucel (or Metamucil if tolerated), taken at least once daily to start, as the first step to help improve your stool consistency. Use this consistently for at least 2-3 months daily for best effect, particularly as this does take some time to work. Bloating is a common side effect of fiber supplementation; this usually gets progressively better after a few weeks if you're able to stick with it.  - May increase this in ongoing care.    3. Recommend trial of scheduled low-dose loperamide (OTC Imodium), 1tab first thing in the morning to start, and observing your response as we discussed today. This may help obtain better predictability/consistency with your stool pattern when used judiciously.   - You may gently increase or decrease the dose/frequency as needed depending on your response as we discussed today; will also review this further in your follow-up.   - Hold loperamide if constipation occurs, or if there are concerns for an acute infection (fevers/chills, similar symptoms in close contacts, \"food poisoning\" or \"stomach flu\" symptoms, etc.).  - If constipation occurs, consider using low-dose loperamide only 2-3 times/week to see if that helps to better balance the symptoms.  - " "Remember, any measures to slow down stools may cause some incidental bloating or increase risk for obstruction; this is expected, but pay attention in case you need to scale back your bowel regimen as we discussed today.    4. Recommend discontinuation of probiotic supplementation for now, discussed risks/precautions in the post-transplant/immunosuppressed setting.    5. Discussed your overall low weight in the setting of prior transplant, cancers, and altered nutritional intake due to persistent/unpredictable diarrhea.  - I would recommend keeping a food/symptom diary to review at next visit, particularly as this may help identify other dietary/stress/volume triggers for your symptoms. Please keep this for at least 3-5 days.  - Could consider role for FODMAP dietary profile education to help inform choices throughout the day (and \" work\" when symptoms arise).  - Dietary fiber intake through fresh fruit, vegetables, and whole grains is generally OK.   - Recommend Nutrition referral to help with calorie counts and to aid with oral supplementation as we get your diarrhea under better control. Significant medical comorbidities and treatment over the last few years, suspect a component of metabolic reset contributing to weight concerns (relationship with chronic EBV viremia?). May require work with Nutrition and caloric/hydrolyzed nutritional challenges in addition to psychosocial support to improve.    6. Continue to monitor for the danger signs/symptoms we reviewed together today:  worsening abdominal pain, worsening diarrhea, bowel/bladder obstructive symptoms (nausea/vomiting, abdominal distention, difficulty passing stool/flatus/urine), blood mixed into stools, persistent fevers/chills, progressive anemia (particularly with iron deficiency), difficulty with swallowing, perianal/rectal discomfort (particularly with defecation), unexpected weight loss, etc.  - Contact us via [employer] or phone should these " symptoms occur, particularly as we may advise further evaluation accordingly.    2. Cancer Screening  No known FH CRC, colonoscopy 8/2017 negative for adenomatous lesions. Recommend repeat CRC surveillance in 2022 in post-transplant/immunosuppressed setting, unless symptomatic sooner.    RTC 3-4 months with GI PA, sooner if symptomatic.     Thank you for this consultation. It was a pleasure to participate in the care of this patient; please contact us with any further questions.     Sushli Crisostomo MD   of Medicine  [facility] - Department of Medicine  Division of Gastroenterology

## 2019-05-06 NOTE — PROGRESS NOTES
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 Reports having 3 variable consistency stools/day w/o blood on \"good days\", \"constant\" loose watery stools on \"bad days\" (responsive to 2-3 sequential doses of standard OTC Imodium when used). Denies any tenesmus or insecurity passing flatus, no fecal incontinence or new perianal/nocturnal sxs noted. Denies any overt obstructive sxs at this time, +ongoing passage of stool/flatus. Denies any N/V/F/C/HA/NS or other const/syst/cardiopulmonary sxs, no BRB/melena in stool or overt urinary changes, +persistent low wt over the last 2-4yrs (likely combination of comorbid illness/treatment and avoidant of PO intake d/t unmitigated diarrhea per d/w pt today). No other bowel/bladder habit changes, no dysphagia/odynophagia. No jaundice/icterus/pruritus, no acholic stools/steatorrhea, no new lumps/bumps, no jt pain/oral ulcer/rash/eye sxs noted.    ROS: 10pt ROS performed and otherwise negative.    PERTINENT PAST MEDICAL/SURGICAL HISTORY:  As noted above.    PERTINENT MEDICATIONS:  - sirolimus  - PDN  - Anticoagulation/Antiplatelet Agents: Pletal, Plavix  Medications reviewed with patient today, see Medication List/Assessment for details.  No other NSAID/anticoagulation reported by patient.  No other OTC/herbal/supplements reported by patient.    SOCIAL " HISTORY: Tobacco: +ongoing intermittent cigarette use (1cig/day). +occ THC use, denies any etoh or other rd/ivda.    FAMILY HISTORY:  Sister w/ cervical CA, niece w/ breast CA. Daughter w/ RA. No colon/panc/esophageal/other GI CA, no other Zuniga or other HPS-related Mook. No IBD/celiac, no other AI/liver/thyroid disease. No known FH bleeding/clotting disorders.    PHYSICAL EXAMINATION:  Vitals reviewed, AFVSS  Wt 97# today (sl decr ~4# since 4/9/19)  Gen: aaox3, cooperative, pleasant, not diaphoretic, nad  HEENT: ncat, neck supple, no clad/sclad, normal op w/o ulcer/exudate, anicteric, mmm  Resp/CV without acute findings, not dyspneic/tachycardic  Abd: +nabs, soft, nt, nd, no peritoneal s/s noted. No ecchymoses, +L-sided angular surgical scar w/ palpable LLQ transplanted kidney, no CVA/spinal tenderness noted.  Ext: no c/c/e  Skin: warm, perfused, no jaundice  Neuro: grossly intact, no asterixis noted    PERTINENT STUDIES:  Surgical Pathology 8/9/17  A. TERMINAL ILEUM NORMAL BIOPSY,:   - Small bowel mucosa with no significant histologic abnormality     B. COLON, RANDOM, BIOPSY:   - Colonic mucosa with no significant histologic abnormality   - No evidence of microscopic colitis or mycophenolate toxicity     C. SIGMOID COLON POLYP, BIOPSY:   - Hyperplastic polyp     ASSESSMENT/PLAN:    1. Chronic Diarrhea, particularly in the complicated post-transplant + polypharmacy setting, chronic EBV viremia, chronic low weight in the post-transplant/multi-comorbidity setting - discussed next steps in evaluation and management together today  1. It was wonderful getting a chance to meet with you to discuss your Chronic Diarrhea, particularly in the complicated post-transplant + polypharmacy setting - discussed next steps in evaluation and management together today.  - Discussed diagnostic Colonoscopy 8/2017 findings, essentially normal without evidence of Microscopic Colitis or infection on colonic biopsies. No immediate need to  "repeat Colonoscopy at this time, can readdress if needed in ongoing care.  - Discussed your loperamide-responsive diarrhea which usually indicates a bothersome but benign diarrhea.  - Suspect multifactorial diarrhea in the complicated post-transplant + polypharmacy setting, recommend symptomatic management for now in the absence of other clinical alarm features.    2. Recommend powdered fiber supplementation with Benefiber or Citrucel (or Metamucil if tolerated), taken at least once daily to start, as the first step to help improve your stool consistency. Use this consistently for at least 2-3 months daily for best effect, particularly as this does take some time to work. Bloating is a common side effect of fiber supplementation; this usually gets progressively better after a few weeks if you're able to stick with it.  - May increase this in ongoing care.    3. Recommend trial of scheduled low-dose loperamide (OTC Imodium), 1tab first thing in the morning to start, and observing your response as we discussed today. This may help obtain better predictability/consistency with your stool pattern when used judiciously.   - You may gently increase or decrease the dose/frequency as needed depending on your response as we discussed today; will also review this further in your follow-up.   - Hold loperamide if constipation occurs, or if there are concerns for an acute infection (fevers/chills, similar symptoms in close contacts, \"food poisoning\" or \"stomach flu\" symptoms, etc.).  - If constipation occurs, consider using low-dose loperamide only 2-3 times/week to see if that helps to better balance the symptoms.  - Remember, any measures to slow down stools may cause some incidental bloating or increase risk for obstruction; this is expected, but pay attention in case you need to scale back your bowel regimen as we discussed today.    4. Recommend discontinuation of probiotic supplementation for now, discussed " "risks/precautions in the post-transplant/immunosuppressed setting.    5. Discussed your overall low weight in the setting of prior transplant, cancers, and altered nutritional intake due to persistent/unpredictable diarrhea.  - I would recommend keeping a food/symptom diary to review at next visit, particularly as this may help identify other dietary/stress/volume triggers for your symptoms. Please keep this for at least 3-5 days.  - Could consider role for FODMAP dietary profile education to help inform choices throughout the day (and \" work\" when symptoms arise).  - Dietary fiber intake through fresh fruit, vegetables, and whole grains is generally OK.   - Recommend Nutrition referral to help with calorie counts and to aid with oral supplementation as we get your diarrhea under better control. Significant medical comorbidities and treatment over the last few years, suspect a component of metabolic reset contributing to weight concerns (relationship with chronic EBV viremia?). May require work with Nutrition and caloric/hydrolyzed nutritional challenges in addition to psychosocial support to improve.    6. Continue to monitor for the danger signs/symptoms we reviewed together today:  worsening abdominal pain, worsening diarrhea, bowel/bladder obstructive symptoms (nausea/vomiting, abdominal distention, difficulty passing stool/flatus/urine), blood mixed into stools, persistent fevers/chills, progressive anemia (particularly with iron deficiency), difficulty with swallowing, perianal/rectal discomfort (particularly with defecation), unexpected weight loss, etc.  - Contact us via MyChart or phone should these symptoms occur, particularly as we may advise further evaluation accordingly.    2. Cancer Screening  No known FH CRC, colonoscopy 8/2017 negative for adenomatous lesions. Recommend repeat CRC surveillance in 2022 in post-transplant/immunosuppressed setting, unless symptomatic sooner.    RTC 3-4 months " with GI PA, sooner if symptomatic.     Thank you for this consultation. It was a pleasure to participate in the care of this patient; please contact us with any further questions.     Sushil Crisostomo MD   of Medicine  [facility] - Department of Medicine  Division of Gastroenterology

## 2019-05-07 ENCOUNTER — PRE VISIT (OUTPATIENT)
Dept: ENDOCRINOLOGY | Facility: CLINIC | Age: 69
End: 2019-05-07

## 2019-05-09 NOTE — PROGRESS NOTES
Patient presents today for evaluation.     Her history is as follows:  Brief Cancer History:   1/07: + HPV 6 Low risk   10/07: ASCUS, + HPV 56, 54 & 6. 12/07: [facility]: TAL II   3/11/08: LEEP - TAL II   8/08: ASCUS, ECC atypia, +HPV 82   9/08: [facility] - Atypia   5/09: NIL pap, 11/09: NIL pap, neg HPV   4/13: LSIL, + HPV 33 or 45, 61.   5/15/13: [facility] - VAIN II & III,TAL II. Referred to gyn onc.   6/20/13: [facility] with gyn onc. Plan LEEP and CO2 laser to vagina, cx and vaginal vault.   7/17/13: [facility] in OR, Co2 laser vagina and vulva , YUNIOR 1- 2, AIN 2-3, VAIN 2-3, LEEP, ECC negative.  12/2/13: Colonoscopy negative. Pap ASC-H  5/8/14:  Pap ASCUS/AGC  5/22/14:  Pap ASCUS, Anal pap ASC-US, labial biopsy negative  7/15/14:  Pap LSIL, right & left vulvar biopsy HSIL, CO2 laser  4/2/15:  Pap ASCUS, vaginal suburethral biopsy VIN1-2  5/26/16:  Atypical glandular cells, pap     6/28/16:  EMB, ECC.  Endometrial biopsy with superficial atrophic benign endometrium.  ECC CIN3     8/17/16:  EUA, colposcopy vagina, LEEP, Vaginal biopsies.  LEEP CIN1, margins negative.  ECC with reactive change, vaginal biopsy VIN1       6/1/17: ECC + cervical biopsy negative, pap NILM + HPV     3/14/18:  Invasive anal squamous cell carcinoma, excised but close margins.    Underwent chemoradiation completed on 6/11/18.     9/4/18:   Pap ASC-H, vaginal bx VAIN2, HR HPV-     9/25/18:  CO2 laser ablation upper vagina and cervix        Patient returns today for follow-up.  Overall doing well, stable symptoms. No vaginal bleeding, no vulvar symptoms, no new pain.  Some chronic GI symptoms.     Answers for HPI/ROS submitted by the patient on 4/9/2019   General Symptoms: Yes  Skin Symptoms: No  HENT Symptoms: No  EYE SYMPTOMS: No  HEART SYMPTOMS: Yes  LUNG SYMPTOMS: No  INTESTINAL SYMPTOMS: Yes  URINARY SYMPTOMS: No  GYNECOLOGIC SYMPTOMS: No  BREAST SYMPTOMS: No  SKELETAL SYMPTOMS: No  BLOOD SYMPTOMS: No  NERVOUS SYSTEM SYMPTOMS: No  MENTAL HEALTH SYMPTOMS:  No  Fever: No  Loss of appetite: No  Weight loss: No  Weight gain: No  Fatigue: Yes  Night sweats: No  Chills: No  Increased stress: No  Excessive hunger: No  Excessive thirst: No  Feeling hot or cold when others believe the temperature is normal: No  Loss of height: No  Post-operative complications: No  Surgical site pain: No  Hallucinations: No  Change in or Loss of Energy: No  Hyperactivity: No  Confusion: No  Chest pain or pressure: No  Fast or irregular heartbeat: No  Pain in legs with walking: Yes  Trouble breathing while lying down: No  Fingers or toes appear blue: No  High blood pressure: Yes  Low blood pressure: No  Fainting: No  Murmurs: No  Pacemaker: No  Varicose veins: No  Edema or swelling: Yes  Wake up at night with shortness of breath: No  Light-headedness: No  Exercise intolerance: No  Heart burn or indigestion: Yes  Nausea: No  Vomiting: No  Abdominal pain: No  Bloating: Yes  Constipation: No  Diarrhea: Yes  Blood in stool: No  Black stools: No  Rectal or Anal pain: No  Fecal incontinence: Yes  Yellowing of skin or eyes: No  Vomit with blood: No  Change in stools: No      Past Medical History:    Past Medical History:   Diagnosis Date     Abnormal coagulation profile     p [id number] heterozygote      Age-related osteoporosis without current pathological fracture 6/22/2019     Anemia      Antiplatelet or antithrombotic long-term use      ASCUS with positive high risk HPV 2007, 2015    + HPV 56, 54,& 6, colp - TAL III, Leep =TAL II     Basal cell carcinoma      Hypertension      Immunosuppressed status (H)     due meds     Kidney replaced by transplant 9/04    Living donor recipient,  Rejection 7/2005     LSIL (low grade squamous intraepithelial lesion) on Pap smear 4/2013    +HPV 33 or 45, 61       PAD (peripheral artery disease) (H)      PONV (postoperative nausea and vomiting)      Squamous cell lung cancer (H)      Thrombosis of leg 1967     Unspecified disorder of kidney and ureter     X-linked  dominant Alport's syndrome.         Past Surgical History:    Past Surgical History:   Procedure Laterality Date     BIOPSY ANAL N/A 3/14/2018    Procedure: BIOPSY ANAL;;  Surgeon: Shabbir Leo MD;  Location: [facility]     C NONSPECIFIC PROCEDURE      Thrombectomy     C NONSPECIFIC PROCEDURE  1955 and 1959    Bilater eye surgery - correction for crossed eyes     C NONSPECIFIC PROCEDURE  1998    oopherectomy L     C NONSPECIFIC PROCEDURE  1967    open kidney biopsy - L     C TRANSPLANTATION OF KIDNEY  9/04    recipient -- done at [facility]     COLONOSCOPY       COLONOSCOPY N/A 8/9/2017    Procedure: COMBINED COLONOSCOPY, SINGLE OR MULTIPLE BIOPSY/POLYPECTOMY BY BIOPSY;;  Surgeon: Sushil Hyatt MD;  [facility]     COLPOSCOPY,LOOP ELECTRD CERVIX EXCIS  03/11/08    TAL II     CONIZATION LEEP  7/17/2013    Procedure: CONIZATION LEEP;;  Surgeon: Liliana Renteria MD;  Location: [facility]     CONIZATION LEEP N/A 8/17/2016    Procedure: CONIZATION LEEP;  Surgeon: Liliana Renteria MD;  [facility]     EXAM UNDER ANESTHESIA ANUS  7/15/2014    Procedure: EXAM UNDER ANESTHESIA ANUS;  Surgeon: Radha Musa MD;  [facility]     EXAM UNDER ANESTHESIA ANUS N/A 3/14/2018    Procedure: EXAM UNDER ANESTHESIA ANUS;  Anal Exam Under Anesthesia With Excision of anal lesion, proctoscopy;  Surgeon: Shabbir Leo MD;  Location: [facility]     EYE SURGERY       LASER CO2 EXCISE VULVA WIDE LOCAL  7/15/2014    Procedure: LASER CO2 EXCISE VULVA WIDE LOCAL;  Surgeon: Liliana Renteria MD;  [facility]     LASER CO2 VAGINA  7/17/2013    Procedure: LASER CO2 VAGINA;;  Surgeon: Liliana Renteria MD;  [facility]     LASER CO2 VAGINA N/A 9/25/2018    Procedure: LASER CO2 VAGINA;  Exam Under Anesthesia, CO2 Laser Ablation of Upper Vagina and Cervix;  Surgeon: Pati Garcia MD;  Location: [facility]     MICROSCOPY ANAL  7/17/2013    Procedure: MICROSCOPY ANAL;  Anal Microscopy,  EUA vagina,Colposcopy Of Vagina  And Vulva, Vaginal Biopsies, Omniguide Co2 Laser To Vagina and vulva, Loop Electrosurgical Excision Procedure To Cervix;  Surgeon: Radha Musa MD;  Location: [facility]     MICROSCOPY ANAL  7/15/2014    Procedure: MICROSCOPY ANAL;  Surgeon: Radha Musa MD;  [facility]         Current Medications:     has a current medication list which includes the following prescription(s): acetaminophen, calcium carbonate 600 mg-vitamin d 400 units, sirolimus, acetaminophen-codeine, amoxicillin, apixaban anticoagulant, atorvastatin, calcium citrate, cilostazol, COMPRESSION STOCKINGS, culturelle digestive health, losartan, metoprolol tartrate, nifedipine er osmotic, prednisone, and sirolimus.       Allergies:     [unfilled]        Social History:     Social History     Tobacco Use     Smoking status: Former Smoker     Packs/day: 0.30     Years: 35.00     Pack years: 10.50     Types: Cigarettes     Start date: 1967     Smokeless tobacco: Never Used   Substance Use Topics     Alcohol use: Yes     Alcohol/week: 0.0 standard drinks     Frequency: Monthly or less     Drinks per session: 1 or 2     Binge frequency: Less than monthly     Comment: rarely       History   Drug Use No           Family History:     The patient's family history is notable for:    Family History   Problem Relation Age of Onset     Diabetes Father         type 2 diag age,60's     Alcohol/Drug Father      Arthritis Father      Hypertension Father      Lipids Father         high cholesterol     Arthritis Mother      Diabetes Mother      Depression Mother      Heart Disease Mother      Neurologic Disorder Mother      Obesity Mother      Psychotic Disorder Mother      Thyroid Disease Mother      Gynecology Sister         Precancerous cell removal from cervix at age 45     Depression Sister      Allergies Sister      Alcohol/Drug Sister      Neurologic Disorder Sister      Cerebrovascular Disease Paternal Grandmother          of  a stroke in her 80's     Diabetes Paternal Grandmother      Alcohol/Drug Son      Colon Polyps Sister      Colon Cancer No family hx of      Crohn's Disease No family hx of      Ulcerative Colitis No family hx of      Melanoma No family hx of      Skin Cancer No family hx of          Physical Exam:     /81   Pulse 78   Temp 97.9  F (36.6  C) (Oral)   Wt 45.8 kg (101 lb)   SpO2 98%   BMI 18.47 kg/m    Body mass index is 18.47 kg/m .    General Appearance: healthy and alert, no distress     Musculoskeletal: extremities non tender and without edema    Skin: no lesions or rashes     Neurological: normal gait, no gross defects     Psychiatric: appropriate mood and affect                               Hematological: normal cervical, supraclavicular and inguinal lymph nodes     Gastrointestinal:       abdomen soft, non-tender, non-distended, no organomegaly or masses    Colposcopy Note:     Written and verbal informed consent obtained.     Prior to the start of the procedure and with procedural staff participation, I verbally confirmed the patient s identity using two indicators, relevant allergies, that the procedure was appropriate and matched the consent or emergent situation, and that the correct equipment/implants were available. Immediately prior to starting the procedure I conducted the Time Out with the procedural staff and re-confirmed the patient s name, procedure, and site/side. (The Joint Commission universal protocol was followed.)  Yes     Sedation (Moderate or Deep): None     Genitourinary: External genitalia and urethral meatus appears normal, BUS negative, no lesions. Vagina is smooth without nodularity or masses.  Vaginal apex and cervix scarred from multiple procedures.     After placement of speculum and full visualization of cervix, colposcopy of cervix and entire vagina performed.  Entire cervix and upper vagina visualized, no gross lesions. Pap smear obtained.  Cervix clean with saline and  green filter applied with stable findings.  5% acetic acid solution applied to cervix and visualized under colposcopic enhancement.  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           Plan:      1.)        Long standing history of cervical and vulvar dysplasia:  Pap done today.  She will be contacted with results and recs for follow-up.        2.)        Anal cancer:  Status post surgery, chemoXRT. Follow-up with Medical Oncology and CR.       3.)        Labs and/or tests ordered include:  Pap     Pati Garcia MD  Gynecologic Oncology  [facility]    CC  Patient Care Team:  Lsia Martinez DO as PCP - General (Family Practice)  Hitesh See MD as MD (Nephrology)  Nyasia Gaines MD as Referring Physician (OB/Gyn)  Joel Davis MD as MD (Family Practice)  Rosalie Pelletier PA-C as Physician Assistant (Physician Assistant)  Lisa Martinez DO as Assigned PCP  Liliana Renteria MD as MD (Oncology)  Leelee Evans MD as MD (INTERNAL MEDICINE - ENDOCRINOLOGY, DIABETES & METABOLISM)  Juan Rene MD as MD (Internal Medicine - Medical Oncology)  Melody Mejia, RN as Specialty Care Coordinator (Hematology & Oncology)  PATI GARCIA

## 2019-05-15 ENCOUNTER — MYC MEDICAL ADVICE (OUTPATIENT)
Dept: FAMILY MEDICINE | Facility: CLINIC | Age: 69
End: 2019-05-15

## 2019-05-16 NOTE — TELEPHONE ENCOUNTER
PN, form in yellow guide in your box.  Please advise when complete and team can inform pt    Mireya Mays CMA

## 2019-05-17 NOTE — TELEPHONE ENCOUNTER
Called patient she will  from at .Aware of missing signature.Copy sent to scan    Thanks  Monse HENSLEY

## 2019-06-12 ENCOUNTER — OFFICE VISIT (OUTPATIENT)
Dept: ENDOCRINOLOGY | Facility: CLINIC | Age: 69
End: 2019-06-12
Attending: FAMILY MEDICINE
Payer: COMMERCIAL

## 2019-06-12 VITALS
HEART RATE: 78 BPM | BODY MASS INDEX: 15.51 KG/M2 | DIASTOLIC BLOOD PRESSURE: 75 MMHG | WEIGHT: 96.5 LBS | HEIGHT: 66 IN | SYSTOLIC BLOOD PRESSURE: 151 MMHG

## 2019-06-12 DIAGNOSIS — R60.9 EDEMA, UNSPECIFIED TYPE: ICD-10-CM

## 2019-06-12 DIAGNOSIS — E83.50 DISORDER OF CALCIUM METABOLISM: ICD-10-CM

## 2019-06-12 DIAGNOSIS — M81.0 OSTEOPOROSIS, UNSPECIFIED OSTEOPOROSIS TYPE, UNSPECIFIED PATHOLOGICAL FRACTURE PRESENCE: Primary | ICD-10-CM

## 2019-06-12 DIAGNOSIS — M81.0 OSTEOPOROSIS, UNSPECIFIED OSTEOPOROSIS TYPE, UNSPECIFIED PATHOLOGICAL FRACTURE PRESENCE: ICD-10-CM

## 2019-06-12 LAB
ALBUMIN SERPL-MCNC: 3.4 G/DL (ref 3.4–5)
ALP SERPL-CCNC: 119 U/L (ref 40–150)
ALT SERPL W P-5'-P-CCNC: 19 U/L (ref 0–50)
ANION GAP SERPL CALCULATED.3IONS-SCNC: 8 MMOL/L (ref 3–14)
BUN SERPL-MCNC: 21 MG/DL (ref 7–30)
CA-I SERPL ISE-MCNC: 4.9 MG/DL (ref 4.4–5.2)
CALCIUM SERPL-MCNC: 9.2 MG/DL (ref 8.5–10.1)
CHLORIDE SERPL-SCNC: 108 MMOL/L (ref 94–109)
CO2 SERPL-SCNC: 25 MMOL/L (ref 20–32)
CREAT SERPL-MCNC: 1.31 MG/DL (ref 0.52–1.04)
ERYTHROCYTE [SEDIMENTATION RATE] IN BLOOD BY WESTERGREN METHOD: 16 MM/H (ref 0–30)
GFR SERPL CREATININE-BSD FRML MDRD: 42 ML/MIN/{1.73_M2}
GLUCOSE SERPL-MCNC: 114 MG/DL (ref 70–99)
PHOSPHATE SERPL-MCNC: 3.2 MG/DL (ref 2.5–4.5)
POTASSIUM SERPL-SCNC: 3.8 MMOL/L (ref 3.4–5.3)
PTH-INTACT SERPL-MCNC: 89 PG/ML (ref 18–80)
SODIUM SERPL-SCNC: 140 MMOL/L (ref 133–144)
TSH SERPL DL<=0.005 MIU/L-ACNC: 0.98 MU/L (ref 0.4–4)

## 2019-06-12 ASSESSMENT — ENCOUNTER SYMPTOMS
EXERCISE INTOLERANCE: 0
BLOATING: 1
CONSTIPATION: 0
LEG PAIN: 1
ABDOMINAL PAIN: 1
SLEEP DISTURBANCES DUE TO BREATHING: 0
RECTAL PAIN: 0
HYPERTENSION: 1
BLOOD IN STOOL: 0
LIGHT-HEADEDNESS: 0
BOWEL INCONTINENCE: 1
BRUISES/BLEEDS EASILY: 1
VOMITING: 0
NAUSEA: 1
JAUNDICE: 0
DIARRHEA: 1
SYNCOPE: 0
HEARTBURN: 1
PALPITATIONS: 0
ORTHOPNEA: 0
SWOLLEN GLANDS: 0
HYPOTENSION: 0

## 2019-06-12 ASSESSMENT — PAIN SCALES - GENERAL: PAINLEVEL: NO PAIN (0)

## 2019-06-12 ASSESSMENT — MIFFLIN-ST. JEOR: SCORE: 995.47

## 2019-06-12 NOTE — PATIENT INSTRUCTIONS
24 Hour Urine Collection instruction    Decide a day you want to collect the urine for 24 hours.   On the day of collection, discard the first void urine in the morning.   Start collecting all the urine in the provided container for the entire day and over night.   The next morning when you wake up collect the first void urine in the container and then submit the container to the lab.  Store in the refrigerator    --------------------------------------------------------------------------  Labs today, we will review results once available and decide on the treatment.
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
## 2019-06-13 LAB
1,25(OH)2D SERPL-MCNC: 52.4 PG/ML (ref 19.9–79.3)
ALBUMIN SERPL ELPH-MCNC: 3.8 G/DL (ref 3.7–5.1)
ALPHA1 GLOB SERPL ELPH-MCNC: 0.5 G/DL (ref 0.2–0.4)
ALPHA2 GLOB SERPL ELPH-MCNC: 1 G/DL (ref 0.5–0.9)
B-GLOBULIN SERPL ELPH-MCNC: 0.7 G/DL (ref 0.6–1)
GAMMA GLOB SERPL ELPH-MCNC: 1.1 G/DL (ref 0.7–1.6)
M PROTEIN SERPL ELPH-MCNC: 0.1 G/DL
PROT PATTERN SERPL ELPH-IMP: ABNORMAL
TTG IGA SER-ACNC: <1 U/ML
TTG IGG SER-ACNC: <1 U/ML

## 2019-06-14 LAB
ALP BONE SERPL-MCNC: 18.5 UG/L
DEPRECATED CALCIDIOL+CALCIFEROL SERPL-MC: <44 UG/L (ref 20–75)
VITAMIN D2 SERPL-MCNC: <5 UG/L
VITAMIN D3 SERPL-MCNC: 39 UG/L

## 2019-06-22 DIAGNOSIS — M81.0 AGE-RELATED OSTEOPOROSIS WITHOUT CURRENT PATHOLOGICAL FRACTURE: ICD-10-CM

## 2019-06-22 NOTE — PROGRESS NOTES
Work up reviewed.   Sent note to Dr Tucker re: abnormal SPEP.   Prolia ordered for osteoporosis.     Endocrine problem list.   Osteoporosis with low Z score in the setting of CKD,   renal transplant and immunosuppression including Prednisone   Secondary Hyperparathyroidism, difficult to assess given CKD in transplant kidney.

## 2019-06-22 NOTE — RESULT ENCOUNTER NOTE
Dear Dr Tucker,   During osteoporosis work up I found small monoclonal peak in SPEP. I am sending this result for your review to see if you could assess further for this issue.   Thanks.   Ambreen Moses MD  Endocrinology Service

## 2019-06-23 DIAGNOSIS — D47.2 MGUS (MONOCLONAL GAMMOPATHY OF UNKNOWN SIGNIFICANCE): Primary | ICD-10-CM

## 2019-06-27 NOTE — PROGRESS NOTES
PLEASE HELP SCHEDULE THE FOLLOWING APPT(S): Prolia Injection    TIME FRAME NEEDED BY: as available       SCHEDULING COMMENTS (optional): Per Dr Moses  Request sent to clinic coordinators.  Giulia Hernandez RN on 6/27/2019 at 9:36 AM

## 2019-07-29 ENCOUNTER — TELEPHONE (OUTPATIENT)
Dept: ONCOLOGY | Facility: CLINIC | Age: 69
End: 2019-07-29

## 2019-07-29 NOTE — TELEPHONE ENCOUNTER
Tobacco Treatment Program at the [facility] attempted to reach Ms. Yang on 7/29/2019 regarding the tobacco cessation program to help Ms. Yang to quit smoking. We will attempt to reach Ms. Yang another time.

## 2019-08-07 ENCOUNTER — INFUSION THERAPY VISIT (OUTPATIENT)
Dept: INFUSION THERAPY | Facility: CLINIC | Age: 69
End: 2019-08-07
Attending: INTERNAL MEDICINE
Payer: COMMERCIAL

## 2019-08-07 ENCOUNTER — APPOINTMENT (OUTPATIENT)
Dept: LAB | Facility: CLINIC | Age: 69
End: 2019-08-07
Attending: INTERNAL MEDICINE
Payer: COMMERCIAL

## 2019-08-07 VITALS
WEIGHT: 97 LBS | RESPIRATION RATE: 16 BRPM | HEART RATE: 77 BPM | BODY MASS INDEX: 15.63 KG/M2 | TEMPERATURE: 99.3 F | SYSTOLIC BLOOD PRESSURE: 138 MMHG | DIASTOLIC BLOOD PRESSURE: 69 MMHG | OXYGEN SATURATION: 95 %

## 2019-08-07 DIAGNOSIS — M81.0 AGE-RELATED OSTEOPOROSIS WITHOUT CURRENT PATHOLOGICAL FRACTURE: Primary | ICD-10-CM

## 2019-08-07 DIAGNOSIS — C21.1 MALIGNANT NEOPLASM OF ANAL CANAL (H): ICD-10-CM

## 2019-08-07 LAB
ALBUMIN SERPL-MCNC: 3.3 G/DL (ref 3.4–5)
CALCIUM SERPL-MCNC: 9.2 MG/DL (ref 8.5–10.1)
CREAT SERPL-MCNC: 1.44 MG/DL (ref 0.52–1.04)
GFR SERPL CREATININE-BSD FRML MDRD: 38 ML/MIN/{1.73_M2}
PHOSPHATE SERPL-MCNC: 2.8 MG/DL (ref 2.5–4.5)

## 2019-08-07 PROCEDURE — 82310 ASSAY OF CALCIUM: CPT | Performed by: INTERNAL MEDICINE

## 2019-08-07 PROCEDURE — 84100 ASSAY OF PHOSPHORUS: CPT | Performed by: INTERNAL MEDICINE

## 2019-08-07 PROCEDURE — 36415 COLL VENOUS BLD VENIPUNCTURE: CPT

## 2019-08-07 PROCEDURE — 25000128 H RX IP 250 OP 636: Mod: ZF | Performed by: INTERNAL MEDICINE

## 2019-08-07 PROCEDURE — 82040 ASSAY OF SERUM ALBUMIN: CPT | Performed by: INTERNAL MEDICINE

## 2019-08-07 PROCEDURE — 96372 THER/PROPH/DIAG INJ SC/IM: CPT

## 2019-08-07 PROCEDURE — 82565 ASSAY OF CREATININE: CPT | Performed by: INTERNAL MEDICINE

## 2019-08-07 RX ADMIN — DENOSUMAB 60 MG: 60 INJECTION SUBCUTANEOUS at 15:03

## 2019-08-07 ASSESSMENT — PAIN SCALES - GENERAL: PAINLEVEL: NO PAIN (0)

## 2019-08-07 NOTE — PATIENT INSTRUCTIONS
Patient Education     Denosumab Solution for injection  What is this medicine?  DENOSUMAB (den oh lyric mab) slows bone breakdown. Prolia is used to treat osteoporosis in women after menopause and in men. Xgeva is used to prevent bone fractures and other bone problems caused by cancer bone metastases. Xgeva is also used to treat giant cell tumor of the bone.  This medicine may be used for other purposes; ask your health care provider or pharmacist if you have questions.  What should I tell my health care provider before I take this medicine?  They need to know if you have any of these conditions:    dental disease    eczema    infection or history of infections    kidney disease or on dialysis    low blood calcium or vitamin D    malabsorption syndrome    scheduled to have surgery or tooth extraction    taking medicine that contains denosumab    thyroid or parathyroid disease    an unusual reaction to denosumab, other medicines, foods, dyes, or preservatives    pregnant or trying to get pregnant    breast-feeding  How should I use this medicine?  This medicine is for injection under the skin. It is given by a health care professional in a hospital or clinic setting.  If you are getting Prolia, a special MedGuide will be given to you by the pharmacist with each prescription and refill. Be sure to read this information carefully each time.  For Prolia, talk to your pediatrician regarding the use of this medicine in children. Special care may be needed. For Xgeva, talk to your pediatrician regarding the use of this medicine in children. While this drug may be prescribed for children as young as 13 years for selected conditions, precautions do apply.  Overdosage: If you think you've taken too much of this medicine contact a poison control center or emergency room at once.  NOTE: This medicine is only for you. Do not share this medicine with others.  What if I miss a dose?  It is important not to miss your dose. Call your  doctor or health care professional if you are unable to keep an appointment.  What may interact with this medicine?  Do not take this medicine with any of the following medications:    other medicines containing denosumab  This medicine may also interact with the following medications:    medicines that suppress the immune system    medicines that treat cancer    steroid medicines like prednisone or cortisone  This list may not describe all possible interactions. Give your health care provider a list of all the medicines, herbs, non-prescription drugs, or dietary supplements you use. Also tell them if you smoke, drink alcohol, or use illegal drugs. Some items may interact with your medicine.  What should I watch for while using this medicine?  Visit your doctor or health care professional for regular checks on your progress. Your doctor or health care professional may order blood tests and other tests to see how you are doing.  Call your doctor or health care professional if you get a cold or other infection while receiving this medicine. Do not treat yourself. This medicine may decrease your body's ability to fight infection.  You should make sure you get enough calcium and vitamin D while you are taking this medicine, unless your doctor tells you not to. Discuss the foods you eat and the vitamins you take with your health care professional.  See your dentist regularly. Brush and floss your teeth as directed. Before you have any dental work done, tell your dentist you are receiving this medicine.  Do not become pregnant while taking this medicine or for 5 months after stopping it. Women should inform their doctor if they wish to become pregnant or think they might be pregnant. There is a potential for serious side effects to an unborn child. Talk to your health care professional or pharmacist for more information.  What side effects may I notice from receiving this medicine?  Side effects that you should report to  your doctor or health care professional as soon as possible:    allergic reactions like skin rash, itching or hives, swelling of the face, lips, or tongue    breathing problems    chest pain    fast, irregular heartbeat    feeling faint or lightheaded, falls    fever, chills, or any other sign of infection    muscle spasms, tightening, or twitches    numbness or tingling    skin blisters or bumps, or is dry, peels, or red    slow healing or unexplained pain in the mouth or jaw    unusual bleeding or bruising  Side effects that usually do not require medical attention (Report these to your doctor or health care professional if they continue or are bothersome.):    muscle pain    stomach upset, gas  This list may not describe all possible side effects. Call your doctor for medical advice about side effects. You may report side effects to FDA at 3-962-FDA-8730.  Where should I keep my medicine?  This medicine is only given in a clinic, doctor's office, or other health care setting and will not be stored at home.  NOTE: This sheet is a summary. It may not cover all possible information. If you have questions about this medicine, talk to your doctor, pharmacist, or health care provider.  NOTE:This sheet is a summary. It may not cover all possible information. If you have questions about this medicine, talk to your doctor, pharmacist, or health care provider. Copyright  2016 Gold Standard

## 2019-08-07 NOTE — PROGRESS NOTES
Patient presents to [facility] for Prolia injection.  Order written by Ambreen Moses MD   was completed today.  Name and  verified with patient.  See MAR for medication details.  Medication was divided into 1 syringes by pharmacy and given in the following sites:  Abdominal tissue.  Patient tolerated well and was discharged to home.    Administrations This Visit     denosumab (PROLIA) injection 60 mg     Admin Date  2019 Action  Given Dose  60 mg Route  Subcutaneous Administered By  Dolores Antonio CMA Erin Monahan, CMA on 2019 at 3:09 PM

## 2019-08-07 NOTE — NURSING NOTE
Chief Complaint   Patient presents with     Blood Draw     Labs drawn via  by RN in lab. VS taken.      Labs drawn via venipuncture. Vital signs taken. Checked into next appointment.   Dana Isaac RN

## 2019-08-07 NOTE — PROGRESS NOTES
RN spoke with Dr. Moses who gave the ok to proceed with Prolia today with Creatinine result of 1.44.

## 2019-08-08 NOTE — TELEPHONE ENCOUNTER
Tobacco Treatment Program at the [facility] attempted to reach Ms. Yang on 8/8/2019 regarding the tobacco cessation program to help Ms. Yang to quit smoking. We will attempt to reach Ms. Yang another time.     Abena Murdock [facility]  Tobacco Treatment Specialist  PH: [phone]

## 2019-08-19 ENCOUNTER — ONCOLOGY VISIT (OUTPATIENT)
Dept: ONCOLOGY | Facility: CLINIC | Age: 69
End: 2019-08-19
Attending: INTERNAL MEDICINE
Payer: COMMERCIAL

## 2019-08-19 ENCOUNTER — APPOINTMENT (OUTPATIENT)
Dept: LAB | Facility: CLINIC | Age: 69
End: 2019-08-19
Attending: INTERNAL MEDICINE
Payer: COMMERCIAL

## 2019-08-19 VITALS
OXYGEN SATURATION: 95 % | BODY MASS INDEX: 15.5 KG/M2 | WEIGHT: 96.2 LBS | TEMPERATURE: 97.2 F | HEART RATE: 65 BPM | DIASTOLIC BLOOD PRESSURE: 77 MMHG | SYSTOLIC BLOOD PRESSURE: 151 MMHG | RESPIRATION RATE: 16 BRPM

## 2019-08-19 DIAGNOSIS — D47.2 MGUS (MONOCLONAL GAMMOPATHY OF UNKNOWN SIGNIFICANCE): ICD-10-CM

## 2019-08-19 DIAGNOSIS — C21.1 MALIGNANT NEOPLASM OF ANAL CANAL (H): ICD-10-CM

## 2019-08-19 LAB
ALBUMIN SERPL-MCNC: 3.4 G/DL (ref 3.4–5)
ALP SERPL-CCNC: 120 U/L (ref 40–150)
ALT SERPL W P-5'-P-CCNC: 20 U/L (ref 0–50)
ANION GAP SERPL CALCULATED.3IONS-SCNC: 4 MMOL/L (ref 3–14)
AST SERPL W P-5'-P-CCNC: 16 U/L (ref 0–45)
BASOPHILS # BLD AUTO: 0 10E9/L (ref 0–0.2)
BASOPHILS NFR BLD AUTO: 0.5 %
BILIRUB SERPL-MCNC: 0.4 MG/DL (ref 0.2–1.3)
BUN SERPL-MCNC: 12 MG/DL (ref 7–30)
CALCIUM SERPL-MCNC: 8.6 MG/DL (ref 8.5–10.1)
CHLORIDE SERPL-SCNC: 110 MMOL/L (ref 94–109)
CO2 SERPL-SCNC: 25 MMOL/L (ref 20–32)
CREAT SERPL-MCNC: 1.31 MG/DL (ref 0.52–1.04)
DIFFERENTIAL METHOD BLD: ABNORMAL
EOSINOPHIL # BLD AUTO: 0.1 10E9/L (ref 0–0.7)
EOSINOPHIL NFR BLD AUTO: 2.5 %
ERYTHROCYTE [DISTWIDTH] IN BLOOD BY AUTOMATED COUNT: 14.4 % (ref 10–15)
GFR SERPL CREATININE-BSD FRML MDRD: 42 ML/MIN/{1.73_M2}
GLUCOSE SERPL-MCNC: 89 MG/DL (ref 70–99)
HCT VFR BLD AUTO: 42.2 % (ref 35–47)
HGB BLD-MCNC: 13.5 G/DL (ref 11.7–15.7)
IMM GRANULOCYTES # BLD: 0 10E9/L (ref 0–0.4)
IMM GRANULOCYTES NFR BLD: 0.4 %
LDH SERPL L TO P-CCNC: 164 U/L (ref 81–234)
LYMPHOCYTES # BLD AUTO: 0.4 10E9/L (ref 0.8–5.3)
LYMPHOCYTES NFR BLD AUTO: 6.3 %
MCH RBC QN AUTO: 28.8 PG (ref 26.5–33)
MCHC RBC AUTO-ENTMCNC: 32 G/DL (ref 31.5–36.5)
MCV RBC AUTO: 90 FL (ref 78–100)
MONOCYTES # BLD AUTO: 0.5 10E9/L (ref 0–1.3)
MONOCYTES NFR BLD AUTO: 9.5 %
NEUTROPHILS # BLD AUTO: 4.5 10E9/L (ref 1.6–8.3)
NEUTROPHILS NFR BLD AUTO: 80.8 %
NRBC # BLD AUTO: 0 10*3/UL
NRBC BLD AUTO-RTO: 0 /100
PLATELET # BLD AUTO: 237 10E9/L (ref 150–450)
POTASSIUM SERPL-SCNC: 3.6 MMOL/L (ref 3.4–5.3)
PROT SERPL-MCNC: 7.5 G/DL (ref 6.8–8.8)
RBC # BLD AUTO: 4.69 10E12/L (ref 3.8–5.2)
SODIUM SERPL-SCNC: 139 MMOL/L (ref 133–144)
WBC # BLD AUTO: 5.6 10E9/L (ref 4–11)

## 2019-08-19 PROCEDURE — 85025 COMPLETE CBC W/AUTO DIFF WBC: CPT | Performed by: INTERNAL MEDICINE

## 2019-08-19 PROCEDURE — 82784 ASSAY IGA/IGD/IGG/IGM EACH: CPT | Performed by: INTERNAL MEDICINE

## 2019-08-19 PROCEDURE — 86334 IMMUNOFIX E-PHORESIS SERUM: CPT | Performed by: INTERNAL MEDICINE

## 2019-08-19 PROCEDURE — G0463 HOSPITAL OUTPT CLINIC VISIT: HCPCS | Mod: ZF

## 2019-08-19 PROCEDURE — 83615 LACTATE (LD) (LDH) ENZYME: CPT | Performed by: INTERNAL MEDICINE

## 2019-08-19 PROCEDURE — 84165 PROTEIN E-PHORESIS SERUM: CPT | Performed by: INTERNAL MEDICINE

## 2019-08-19 PROCEDURE — 83883 ASSAY NEPHELOMETRY NOT SPEC: CPT | Performed by: INTERNAL MEDICINE

## 2019-08-19 PROCEDURE — 99214 OFFICE O/P EST MOD 30 MIN: CPT | Mod: ZP | Performed by: INTERNAL MEDICINE

## 2019-08-19 PROCEDURE — 00000402 ZZHCL STATISTIC TOTAL PROTEIN: Performed by: INTERNAL MEDICINE

## 2019-08-19 PROCEDURE — 36415 COLL VENOUS BLD VENIPUNCTURE: CPT

## 2019-08-19 PROCEDURE — 82232 ASSAY OF BETA-2 PROTEIN: CPT | Performed by: INTERNAL MEDICINE

## 2019-08-19 PROCEDURE — 80053 COMPREHEN METABOLIC PANEL: CPT | Performed by: INTERNAL MEDICINE

## 2019-08-19 ASSESSMENT — PAIN SCALES - GENERAL: PAINLEVEL: NO PAIN (0)
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
## 2019-08-19 NOTE — NURSING NOTE
Chief Complaint   Patient presents with     Blood Draw     vitals and venipuncture done by FELIX Antonio CMA on 8/19/2019 at 10:46 AM

## 2019-08-19 NOTE — NURSING NOTE
"Oncology Rooming Note    August 19, 2019 10:56 AM   Maribel Yang is a 68 year old female who presents for:    Chief Complaint   Patient presents with     Blood Draw     vitals and venipuncture done by [facility]     Oncology Clinic Visit     Return; Anal Ca     Initial Vitals: BP (!) 151/77 (BP Location: Right arm, Patient Position: Sitting, Cuff Size: Adult Regular)   Pulse 65   Temp 97.2  F (36.2  C) (Oral)   Resp 16   Wt 43.6 kg (96 lb 3.2 oz)   SpO2 95%   Breastfeeding? No   BMI 15.50 kg/m   Estimated body mass index is 15.5 kg/m  as calculated from the following:    Height as of 6/12/19: 1.678 m (5' 6.06\").    Weight as of this encounter: 43.6 kg (96 lb 3.2 oz). Body surface area is 1.43 meters squared.  No Pain (0) Comment: Data Unavailable   No LMP recorded. Patient is postmenopausal.  Allergies reviewed: Yes  Medications reviewed: Yes    Medications: Medication refills not needed today.  Pharmacy name entered into [employer]:    [facility]  [facility] - [place], [place] - [address]    Clinical concerns: No new concerns       Shannan Hernandez CMA              "
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
## 2019-08-20 LAB
ALBUMIN SERPL ELPH-MCNC: 3.7 G/DL (ref 3.7–5.1)
ALPHA1 GLOB SERPL ELPH-MCNC: 0.4 G/DL (ref 0.2–0.4)
ALPHA2 GLOB SERPL ELPH-MCNC: 0.9 G/DL (ref 0.5–0.9)
B-GLOBULIN SERPL ELPH-MCNC: 0.7 G/DL (ref 0.6–1)
B2 MICROGLOB SERPL-MCNC: 3.1 MG/L
GAMMA GLOB SERPL ELPH-MCNC: 1.1 G/DL (ref 0.7–1.6)
IGA SERPL-MCNC: 119 MG/DL (ref 70–380)
IGG SERPL-MCNC: 1030 MG/DL (ref 695–1620)
IGM SERPL-MCNC: 155 MG/DL (ref 60–265)
KAPPA LC UR-MCNC: 3.15 MG/DL (ref 0.33–1.94)
KAPPA LC/LAMBDA SER: 1.46 {RATIO} (ref 0.26–1.65)
LAMBDA LC SERPL-MCNC: 2.16 MG/DL (ref 0.57–2.63)
M PROTEIN SERPL ELPH-MCNC: 0.1 G/DL
PROT PATTERN SERPL ELPH-IMP: ABNORMAL
PROT PATTERN SERPL IFE-IMP: NORMAL

## 2019-08-20 NOTE — TELEPHONE ENCOUNTER
Tobacco Treatment Program at the [facility] attempted to reach Ms. Yang on 8/20/2019 regarding the tobacco cessation program to help Ms. Yang to quit smoking. We will attempt to reach Ms. Yang another time.     Abena Murdock [facility]  Tobacco Treatment Specialist  PH: [phone]

## 2019-08-21 ENCOUNTER — MYC MEDICAL ADVICE (OUTPATIENT)
Dept: FAMILY MEDICINE | Facility: CLINIC | Age: 69
End: 2019-08-21

## 2019-08-21 DIAGNOSIS — I73.9 PERIPHERAL ARTERY DISEASE (H): ICD-10-CM

## 2019-08-21 DIAGNOSIS — E78.2 MIXED HYPERLIPIDEMIA: Primary | ICD-10-CM

## 2019-08-21 DIAGNOSIS — I15.1 HYPERTENSION SECONDARY TO OTHER RENAL DISORDERS: ICD-10-CM

## 2019-08-21 DIAGNOSIS — E78.00 HYPERCHOLESTEREMIA: ICD-10-CM

## 2019-08-21 RX ORDER — CLOPIDOGREL BISULFATE 75 MG/1
TABLET ORAL
Qty: 30 TABLET | Refills: 6 | Status: SHIPPED | OUTPATIENT
Start: 2019-08-21 | End: 2020-03-17

## 2019-08-21 RX ORDER — CILOSTAZOL 100 MG/1
TABLET ORAL
Qty: 60 TABLET | Refills: 6 | Status: SHIPPED | OUTPATIENT
Start: 2019-08-21 | End: 2020-03-18

## 2019-08-21 RX ORDER — NIFEDIPINE 90 MG/1
TABLET, EXTENDED RELEASE ORAL
Qty: 30 TABLET | Refills: 6 | Status: SHIPPED | OUTPATIENT
Start: 2019-08-21 | End: 2020-03-18

## 2019-08-21 RX ORDER — ATORVASTATIN CALCIUM 20 MG/1
TABLET, FILM COATED ORAL
Qty: 30 TABLET | Refills: 1 | Status: SHIPPED | OUTPATIENT
Start: 2019-08-21 | End: 2020-04-14

## 2019-08-21 NOTE — TELEPHONE ENCOUNTER
"Requested Prescriptions   Pending Prescriptions Disp Refills     atorvastatin (LIPITOR) 20 MG tablet [Pharmacy Med Name: ATORVASTATIN CALCIUM 20MG TABS] 30 tablet 11     Sig: TAKE ONE TABLET BY MOUTH EVERY DAY       Statins Protocol Failed - 8/21/2019 12:34 PM        Failed - LDL on file in past 12 months     Recent Labs   Lab Test 08/10/18  1414   LDL 71             Passed - No abnormal creatine kinase in past 12 months     No lab results found.             Passed - Recent (12 mo) or future (30 days) visit within the authorizing provider's specialty     Patient had office visit in the last 12 months or has a visit in the next 30 days with authorizing provider or within the authorizing provider's specialty.  See \"Patient Info\" tab in inbasket, or \"Choose Columns\" in Meds & Orders section of the refill encounter.              Passed - Medication is active on med list        Passed - Patient is age 18 or older        Passed - No active pregnancy on record        Passed - No positive pregnancy test in past 12 months        NIFEdipine ER OSMOTIC (PROCARDIA XL) 90 MG 24 hr tablet [Pharmacy Med Name: NIFEDIPINE ER 90MG OSM TB24] 30 tablet 5     Sig: TAKE ONE TABLET BY MOUTH EVERY DAY       Calcium Channel Blockers Protocol  Failed - 8/21/2019 12:34 PM        Failed - Blood pressure under 140/90 in past 12 months     BP Readings from Last 3 Encounters:   08/19/19 (!) 151/77   08/07/19 138/69   06/12/19 151/75                 Failed - Normal serum creatinine on file in past 12 months     Recent Labs   Lab Test 08/19/19  1044  04/27/12  1522   CR 1.31*   < >  --    CREAT  --   --  1.7*    < > = values in this interval not displayed.             Passed - Recent (12 mo) or future (30 days) visit within the authorizing provider's specialty     Patient had office visit in the last 12 months or has a visit in the next 30 days with authorizing provider or within the authorizing provider's specialty.  See \"Patient Info\" tab in " "inbasket, or \"Choose Columns\" in Meds & Orders section of the refill encounter.              Passed - Medication is active on med list        Passed - Patient is age 18 or older        Passed - No active pregnancy on record        Passed - No positive pregnancy test in past 12 months        cilostazol (PLETAL) 100 MG tablet [Pharmacy Med Name: CILOSTAZOL 100MG TABS] 60 tablet 11     Sig: TAKE ONE TABLET BY MOUTH TWICE A DAY       Platelet Inhibitors Failed - 8/21/2019 12:34 PM        Failed - Normal serum creatinine on file in past 12 months     Recent Labs   Lab Test 08/19/19  1044  04/27/12  1522   CR 1.31*   < >  --    CREAT  --   --  1.7*    < > = values in this interval not displayed.             Passed - Normal HGB on file in past 12 months     Recent Labs   Lab Test 08/19/19  1044   HGB 13.5               Passed - Normal Platelets on file in past 12 months     Recent Labs   Lab Test 08/19/19  1044                  Passed - Recent (12 mo) or future (30 days) visit within the authorizing provider's specialty     Patient had office visit in the last 12 months or has a visit in the next 30 days with authorizing provider or within the authorizing provider's specialty.  See \"Patient Info\" tab in inbasket, or \"Choose Columns\" in Meds & Orders section of the refill encounter.              Passed - Medication is active on med list        Passed - Patient is age 18 or older        Passed - No active pregnancy on record        Passed - No positive pregnancy test in past 12 months        clopidogrel (PLAVIX) 75 MG tablet [Pharmacy Med Name: CLOPIDOGREL BISULFATE 75MG TABS] 30 tablet 11     Sig: TAKE ONE TABLET BY MOUTH EVERY DAY       Plavix Passed - 8/21/2019 12:34 PM        Passed - No active PPI on record unless is Protonix        Passed - Normal HGB on file in past 12 months     Recent Labs   Lab Test 08/19/19  1044   HGB 13.5               Passed - Normal Platelets on file in past 12 months     Recent Labs " "  Lab Test 08/19/19  1044                  Passed - Recent (12 mo) or future (30 days) visit within the authorizing provider's specialty     Patient had office visit in the last 12 months or has a visit in the next 30 days with authorizing provider or within the authorizing provider's specialty.  See \"Patient Info\" tab in inbasket, or \"Choose Columns\" in Meds & Orders section of the refill encounter.              Passed - Medication is active on med list        Passed - Patient is age 18 or older        Passed - No active pregnancy on record        Passed - No positive pregnancy test in past 12 months        Sent in refills except one time refills for atorvastatin, due for fasting labs. Pt informed via [employer] to come in for a lab only appointment.    Aura Stiles RN    "

## 2019-08-22 NOTE — TELEPHONE ENCOUNTER
PN  Pt due for cholesterol, on atorvastatin   Last lipids 8/2018 ok for pt to recheck in 6 months?  Thank you,  Aura Stiles RN

## 2019-08-28 ENCOUNTER — TELEPHONE (OUTPATIENT)
Dept: SURGERY | Facility: CLINIC | Age: 69
End: 2019-08-28

## 2019-09-10 ENCOUNTER — OFFICE VISIT (OUTPATIENT)
Dept: ONCOLOGY | Facility: CLINIC | Age: 69
End: 2019-09-10
Attending: OBSTETRICS & GYNECOLOGY
Payer: COMMERCIAL

## 2019-09-10 VITALS
HEART RATE: 71 BPM | TEMPERATURE: 98.6 F | SYSTOLIC BLOOD PRESSURE: 151 MMHG | BODY MASS INDEX: 15.61 KG/M2 | OXYGEN SATURATION: 91 % | DIASTOLIC BLOOD PRESSURE: 93 MMHG | RESPIRATION RATE: 14 BRPM | WEIGHT: 96.9 LBS

## 2019-09-10 DIAGNOSIS — N89.3 VAGINAL DYSPLASIA: Primary | ICD-10-CM

## 2019-09-10 DIAGNOSIS — N90.3 VULVAR DYSPLASIA: ICD-10-CM

## 2019-09-10 DIAGNOSIS — N87.9 CERVICAL DYSPLASIA: ICD-10-CM

## 2019-09-10 PROCEDURE — 57456 ENDOCERV CURETTAGE W/SCOPE: CPT | Mod: ZF

## 2019-09-10 PROCEDURE — 57421 EXAM/BIOPSY OF VAG W/SCOPE: CPT | Mod: ZF

## 2019-09-10 PROCEDURE — 57420 EXAM OF VAGINA W/SCOPE: CPT | Mod: ZF

## 2019-09-10 PROCEDURE — 56820 COLPOSCOPY VULVA: CPT | Mod: ZF

## 2019-09-10 PROCEDURE — G0463 HOSPITAL OUTPT CLINIC VISIT: HCPCS | Mod: ZF

## 2019-09-10 PROCEDURE — 88175 CYTOPATH C/V AUTO FLUID REDO: CPT | Performed by: OBSTETRICS & GYNECOLOGY

## 2019-09-10 PROCEDURE — 57454 BX/CURETT OF CERVIX W/SCOPE: CPT | Mod: ZP

## 2019-09-10 PROCEDURE — 57454 BX/CURETT OF CERVIX W/SCOPE: CPT

## 2019-09-10 PROCEDURE — G0463 HOSPITAL OUTPT CLINIC VISIT: HCPCS | Mod: 25

## 2019-09-10 PROCEDURE — 87624 HPV HI-RISK TYP POOLED RSLT: CPT | Performed by: OBSTETRICS & GYNECOLOGY

## 2019-09-10 PROCEDURE — G0476 HPV COMBO ASSAY CA SCREEN: HCPCS | Performed by: OBSTETRICS & GYNECOLOGY

## 2019-09-10 PROCEDURE — 88342 IMHCHEM/IMCYTCHM 1ST ANTB: CPT | Performed by: OBSTETRICS & GYNECOLOGY

## 2019-09-10 PROCEDURE — 99213 OFFICE O/P EST LOW 20 MIN: CPT | Mod: 25

## 2019-09-10 PROCEDURE — 88305 TISSUE EXAM BY PATHOLOGIST: CPT | Performed by: OBSTETRICS & GYNECOLOGY

## 2019-09-10 ASSESSMENT — ENCOUNTER SYMPTOMS
ABDOMINAL PAIN: 0
CONSTIPATION: 0
NAUSEA: 0
SKIN CHANGES: 0
POOR WOUND HEALING: 1
NAIL CHANGES: 0
BOWEL INCONTINENCE: 0
RECTAL PAIN: 0
BLOATING: 1
DIARRHEA: 1
VOMITING: 0
BLOOD IN STOOL: 0
HEARTBURN: 0
JAUNDICE: 0

## 2019-09-10 ASSESSMENT — PAIN SCALES - GENERAL: PAINLEVEL: NO PAIN (0)

## 2019-09-10 NOTE — NURSING NOTE
Procedure discussed. Consent signed. Time out completed. Both forms placed into scanning.Prior to the start of the procedure and with procedural staff participation, I verbally confirmed the patient s identity using two indicators, relevant allergies, that the procedure was appropriate and matched the consent or emergent situation, and that the correct equipment/implants were available. Immediately prior to starting the procedure I conducted the Time Out with the procedural staff and re-confirmed the patient s name, procedure, and site/side. (The Joint Commission universal protocol was followed.)  Yes    Sedation (Moderate or Deep): None    Post procedure pain: 0    Cynthia Cruz RN

## 2019-09-10 NOTE — PATIENT INSTRUCTIONS
Colposcopy cervix, vagina and vulva done.  Cervix biopsy  Pap smear    You will be contacted with results on MyChart.    Pati Garcia MD  Gynecologic Oncology  [facility]

## 2019-09-10 NOTE — LETTER
9/10/2019       RE: Maribel Yang  [address]  [facility] [address]     Dear Colleague,    Thank you for referring your patient, Maribel Yang, to the [facility]. Please see a copy of my visit note below.                            Consult Notes on Referred Patient    Date: 9/10/2019       Patient presents today for evaluation.     Her history is as follows:  Brief Cancer History:   1/07: + HPV 6 Low risk   10/07: ASCUS, + HPV 56, 54 & 6. 12/07: Kersey: TAL II   3/11/08: LEEP - TAL II   8/08: ASCUS, ECC atypia, +HPV 82   9/08: Kersey - Atypia   5/09: NIL pap, 11/09: NIL pap, neg HPV   4/13: LSIL, + HPV 33 or 45, 61.   5/15/13: Kersey - VAIN II & III,TAL II. Referred to gyn onc.   6/20/13: Kersey with gyn onc. Plan LEEP and CO2 laser to vagina, cx and vaginal vault.   7/17/13: Colpo in OR, Co2 laser vagina and vulva , YUNIOR 1- 2, AIN 2-3, VAIN 2-3, LEEP, ECC negative.  12/2/13: Colonoscopy negative. Pap ASC-H  5/8/14:  Pap ASCUS/AGC  5/22/14:  Pap ASCUS, Anal pap ASC-US, labial biopsy negative  7/15/14:  Pap LSIL, right & left vulvar biopsy HSIL, CO2 laser  4/2/15:  Pap ASCUS, vaginal suburethral biopsy VIN1-2  5/26/16:  Atypical glandular cells, pap     6/28/16:  EMB, ECC.  Endometrial biopsy with superficial atrophic benign endometrium.  ECC CIN3     8/17/16:  EUA, colposcopy vagina, LEEP, Vaginal biopsies.  LEEP CIN1, margins negative.  ECC with reactive change, vaginal biopsy VIN1       6/1/17: ECC + cervical biopsy negative, pap NILM + HPV     3/14/18:  Invasive anal squamous cell carcinoma, excised but close margins.    Underwent chemoradiation completed on 6/11/18.     9/4/18:   Pap ASC-H, vaginal bx VAIN2, HR HPV-    9/25/18:  CO2 laser ablation upper vagina and cervix    4/9/19:  Pap NILM, HR HPV negative    Patient returns today for follow-up.  Overall doing well, stable symptoms. Dealing with some chronic GI issues for which she is undergoing evaluation and dealing with  weight loss.       Review of Systems:  Answers for HPI/ROS submitted by the patient on 9/10/2019   General Symptoms: No  Skin Symptoms: Yes  HENT Symptoms: No  EYE SYMPTOMS: No  HEART SYMPTOMS: No  LUNG SYMPTOMS: No  INTESTINAL SYMPTOMS: Yes  URINARY SYMPTOMS: No  GYNECOLOGIC SYMPTOMS: No  BREAST SYMPTOMS: No  SKELETAL SYMPTOMS: No  BLOOD SYMPTOMS: No  NERVOUS SYSTEM SYMPTOMS: No  MENTAL HEALTH SYMPTOMS: No  Changes in hair: No  Changes in moles/birth marks: No  Itching: No  Rashes: No  Changes in nails: No  Acne: No  Hair in places you don't want it: No  Change in facial hair: No  Warts: No  Non-healing sores: Yes  Scarring: No  Flaking of skin: No  Color changes of hands/feet in cold : No  Sun sensitivity: No  Skin thickening: No  Heart burn or indigestion: No  Nausea: No  Vomiting: No  Abdominal pain: No  Bloating: Yes  Constipation: No  Diarrhea: Yes  Blood in stool: No  Black stools: No  Rectal or Anal pain: No  Fecal incontinence: No  Yellowing of skin or eyes: No  Vomit with blood: No  Change in stools: No      Past Medical History:    Past Medical History:   Diagnosis Date     Abnormal coagulation profile     p [id number] heterozygote      Age-related osteoporosis without current pathological fracture 6/22/2019     Anemia      Antiplatelet or antithrombotic long-term use      ASCUS with positive high risk HPV 2007, 2015    + HPV 56, 54,& 6, colp - TAL III, Leep =TAL II     Basal cell carcinoma      Hypertension      Immunosuppressed status (H)     due meds     Kidney replaced by transplant 9/04    Living donor recipient,  Rejection 7/2005     LSIL (low grade squamous intraepithelial lesion) on Pap smear 4/2013    +HPV 33 or 45, 61       PAD (peripheral artery disease) (H)      PONV (postoperative nausea and vomiting)      Squamous cell lung cancer (H)      Thrombosis of leg 1967     Unspecified disorder of kidney and ureter     X-linked dominant Alport's syndrome.         Past Surgical History:    Past  Surgical History:   Procedure Laterality Date     BIOPSY ANAL N/A 3/14/2018    Procedure: BIOPSY ANAL;;  Surgeon: Shabbir Leo MD;  [facility]     C NONSPECIFIC PROCEDURE      Thrombectomy     C NONSPECIFIC PROCEDURE  1955 and 1959    Bilater eye surgery - correction for crossed eyes     C NONSPECIFIC PROCEDURE  1998    oopherectomy L     C NONSPECIFIC PROCEDURE  1967    open kidney biopsy - L     C TRANSPLANTATION OF KIDNEY  9/04    recipient -- done [facility]     COLONOSCOPY       COLONOSCOPY N/A 8/9/2017    Procedure: COMBINED COLONOSCOPY, SINGLE OR MULTIPLE BIOPSY/POLYPECTOMY BY BIOPSY;;  Surgeon: Sushil Hyatt MD;  [facility]     COLPOSCOPY,LOOP ELECTRD CERVIX EXCIS  03/11/08    TAL II     CONIZATION LEEP  7/17/2013    Procedure: CONIZATION LEEP;;  Surgeon: Liliana Renteria MD;  [facility]     CONIZATION LEEP N/A 8/17/2016    Procedure: CONIZATION LEEP;  Surgeon: Liliana Renteria MD;  [facility]     EXAM UNDER ANESTHESIA ANUS  7/15/2014    Procedure: EXAM UNDER ANESTHESIA ANUS;  Surgeon: Radha Musa MD;  [facility]     EXAM UNDER ANESTHESIA ANUS N/A 3/14/2018    Procedure: EXAM UNDER ANESTHESIA ANUS;  Anal Exam Under Anesthesia With Excision of anal lesion, proctoscopy;  Surgeon: Shabbir Leo MD;  [facility]     EYE SURGERY       LASER CO2 EXCISE VULVA WIDE LOCAL  7/15/2014    Procedure: LASER CO2 EXCISE VULVA WIDE LOCAL;  Surgeon: Liliana Renteria MD;  [facility]     LASER CO2 VAGINA  7/17/2013    Procedure: LASER CO2 VAGINA;;  Surgeon: Liliana Renteria MD;  [facility]     LASER CO2 VAGINA N/A 9/25/2018    Procedure: LASER CO2 VAGINA;  Exam Under Anesthesia, CO2 Laser Ablation of Upper Vagina and Cervix;  Surgeon: Pati Garcia MD;  [facility]     MICROSCOPY ANAL  7/17/2013    Procedure: MICROSCOPY ANAL;  Anal Microscopy,  EUA vagina,Colposcopy Of Vagina And Vulva, Vaginal Biopsies, Omniguide Co2 Laser To Vagina and  vulva, Loop Electrosurgical Excision Procedure To Cervix;  Surgeon: Radha Musa MD;  [facility]     MICROSCOPY ANAL  7/15/2014    Procedure: MICROSCOPY ANAL;  Surgeon: Radha Musa MD;  [facility]         Health Maintenance:  Health Maintenance Due   Topic Date Due     ZOSTER IMMUNIZATION (1 of 2) 12/15/2002     ADVANCE CARE PLANNING  10/03/2017     PNEUMOCOCCAL IMMUNIZATION 65+ HIGH/HIGHEST RISK (2 of 2 - PPSV23) 06/10/2019     INFLUENZA VACCINE (1) 09/01/2019         Current Medications:     has a current medication list which includes the following prescription(s): acetaminophen, acetaminophen-codeine, amoxicillin, atorvastatin, calcium carbonate 600 mg-vitamin d 400 units, cilostazol, clopidogrel, culturelle digestive health, losartan, metoprolol tartrate, nifedipine er osmotic, prednisone, sirolimus, calcium citrate, metoprolol tartrate, and nifedipine er osmotic.       Allergies:     [unfilled]        Social History:     Social History     Tobacco Use     Smoking status: Former Smoker     Packs/day: 0.30     Years: 35.00     Pack years: 10.50     Types: Cigarettes     Start date: 1/1/1967     Smokeless tobacco: Never Used     Tobacco comment: occ cig   Substance Use Topics     Alcohol use: Yes     Alcohol/week: 0.0 standard drinks     Frequency: Monthly or less     Drinks per session: 1 or 2     Binge frequency: Less than monthly     Comment: rarely       History   Drug Use No           Family History:     The patient's family history is notable for :    Family History   Problem Relation Age of Onset     Diabetes Father         type 2 diag age,60's     Alcohol/Drug Father      Arthritis Father      Hypertension Father      Lipids Father         high cholesterol     Arthritis Mother      Diabetes Mother      Depression Mother      Heart Disease Mother      Neurologic Disorder Mother      Obesity Mother      Psychotic Disorder Mother      Thyroid Disease Mother       Gynecology Sister         Precancerous cell removal from cervix at age 45     Depression Sister      Allergies Sister      Alcohol/Drug Sister      Neurologic Disorder Sister      Cerebrovascular Disease Paternal Grandmother          of a stroke in her 80's     Diabetes Paternal Grandmother      Alcohol/Drug Son      Colon Polyps Sister      Colon Cancer No family hx of      Crohn's Disease No family hx of      Ulcerative Colitis No family hx of      Melanoma No family hx of      Skin Cancer No family hx of          Physical Exam:     BP (!) 151/93   Pulse 71   Temp 98.6  F (37  C) (Oral)   Resp 14   Wt 44 kg (96 lb 14.4 oz)   SpO2 91%   BMI 15.61 kg/m     Body mass index is 15.61 kg/m .    General Appearance: Thin, alert, no distress      Musculoskeletal: extremities non tender and without edema    Skin: no lesions or rashes     Neurological: normal gait, no gross defects     Psychiatric: appropriate mood and affect                               Hematological: normal cervical, supraclavicular and inguinal lymph nodes     Gastrointestinal:       abdomen soft, non-tender, non-distended, no organomegaly or masses    Colposcopy Note:     Written and verbal informed consent obtained.     Prior to the start of the procedure and with procedural staff participation, I verbally confirmed the patient s identity using two indicators, relevant allergies, that the procedure was appropriate and matched the consent or emergent situation, and that the correct equipment/implants were available. Immediately prior to starting the procedure I conducted the Time Out with the procedural staff and re-confirmed the patient s name, procedure, and site/side. (The Joint Commission universal protocol was followed.)  Yes     Sedation (Moderate or Deep): None     Genitourinary: External genitalia and urethral meatus appears normal, BUS negative, no lesions. Vagina is smooth without nodularity or masses.  Vaginal apex and cervix  scarred from multiple procedures.     After placement of speculum and full visualization of cervix, colposcopy of cervix and entire vagina performed.  Entire cervix and upper vagina visualized, no gross lesions. Pap smear obtained.  Cervix clean with saline and green filter applied with increased vascularity at 12 o'clock noted.  5% acetic acid solution applied to cervix and visualized under colposcopic enhancement.  Small os, TMZ not seen. Acetowhite changes at 12 o'clock although no discrete lesion.  ECC and cx biopsy done.    Speculum removed and colposcopy of external genitalia and vulva done.  Gauze soaked with 5% acetic acid applied and viewed under colposcopic enhancement.  No discrete lesions.  No biopsies.      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           Plan:      1.)        Long standing history of cervical and vulvar dysplasia:  Pap and biopsies done today.  She will be contacted with results and recs for follow-up.        2.)        Anal cancer:  Status post surgery, chemoXRT. Follow-up with Medical Oncology and CR.       3.)        Labs and/or tests ordered include:  Pap, biopsy     Pati Garcia MD  Gynecologic Oncology  [facility]  CC  Patient Care Team:  Lisa Martinez DO as PCP - General (Family Practice)  Hitesh See MD as MD (Nephrology)  Nyasia Gaines MD as Referring Physician (OB/Gyn)  Joel Davis MD as MD (Family Practice)  Rosalie Pelletier PA-C as Physician Assistant (Physician Assistant)  Lisa Martinez DO as Assigned PCP  Liliana Renteria MD as MD (Oncology)  Meggan Tucker MD as MD (Hematology & Oncology)  Kianna Dyer, AVERY as Nurse Coordinator (Hematology &  Oncology)  Leelee Evans MD as MD (INTERNAL MEDICINE - ENDOCRINOLOGY, DIABETES & METABOLISM)  Ambreen Moses MD as MD (INTERNAL MEDICINE - ENDOCRINOLOGY, DIABETES & METABOLISM)  SELF, REFERRED      Again, thank you for allowing me to participate in the care of your patient.      Sincerely,     GYN ONC Colposcopy Provider

## 2019-09-10 NOTE — NURSING NOTE
"Oncology Rooming Note    September 10, 2019 10:15 AM   Maribel Yang is a 68 year old female who presents for:    Chief Complaint   Patient presents with     Oncology Clinic Visit     Return - YUNIOR III     Initial Vitals: BP (!) 151/93   Pulse 71   Temp 98.6  F (37  C) (Oral)   Resp 14   Wt 44 kg (96 lb 14.4 oz)   SpO2 91%   BMI 15.61 kg/m   Estimated body mass index is 15.61 kg/m  as calculated from the following:    Height as of 6/12/19: 1.678 m (5' 6.06\").    Weight as of this encounter: 44 kg (96 lb 14.4 oz). Body surface area is 1.43 meters squared.  No Pain (0) Comment: Data Unavailable   No LMP recorded. Patient is postmenopausal.  Allergies reviewed: Yes  Medications reviewed: Yes    Medications: Medication refills not needed today.  Pharmacy name entered into [employer]:    [facility]  [facility] - [place], [place] - [address]    Clinical concerns: Colpo Exam       [place]              "

## 2019-09-13 LAB
COPATH REPORT: NORMAL
PAP: NORMAL

## 2019-09-16 LAB
COPATH REPORT: NORMAL
FINAL DIAGNOSIS: ABNORMAL
HPV HR 12 DNA CVX QL NAA+PROBE: POSITIVE
HPV16 DNA SPEC QL NAA+PROBE: NEGATIVE
HPV18 DNA SPEC QL NAA+PROBE: NEGATIVE
SPECIMEN DESCRIPTION: ABNORMAL
SPECIMEN SOURCE CVX/VAG CYTO: ABNORMAL

## 2019-09-18 DIAGNOSIS — I15.1 HYPERTENSION SECONDARY TO OTHER RENAL DISORDERS: ICD-10-CM

## 2019-09-18 RX ORDER — METOPROLOL TARTRATE 100 MG
TABLET ORAL
Qty: 180 TABLET | Refills: 1 | Status: SHIPPED | OUTPATIENT
Start: 2019-09-18 | End: 2020-02-21

## 2019-09-18 NOTE — TELEPHONE ENCOUNTER
"Requested Prescriptions   Pending Prescriptions Disp Refills     metoprolol tartrate (LOPRESSOR) 100 MG tablet [Pharmacy Med Name: METOPROLOL TARTRATE 100MG TABS] 180 tablet 1     Sig: TAKE ONE TABLET BY MOUTH TWICE A DAY       Beta-Blockers Protocol Failed - 9/18/2019 10:53 AM        Failed - Blood pressure under 140/90 in past 12 months     BP Readings from Last 3 Encounters:   09/10/19 (!) 151/93   08/19/19 (!) 151/77   08/07/19 138/69                 Passed - Patient is age 6 or older        Passed - Recent (12 mo) or future (30 days) visit within the authorizing provider's specialty     Patient had office visit in the last 12 months or has a visit in the next 30 days with authorizing provider or within the authorizing provider's specialty.  See \"Patient Info\" tab in inbasket, or \"Choose Columns\" in Meds & Orders section of the refill encounter.              Passed - Medication is active on med list        Prescription approved per [facility] Refill Protocol.  "

## 2019-09-30 NOTE — PROGRESS NOTES
Colon and Rectal Surgery Clinic Note      RE: Maribel Yang  : 1950  JESSEE: 10/1/2019    Maribel is a 67 year old female who presents today for follow up of her anal cancer.    HPI: Maribel has a past medical history of kidney transplant in , lung cancer in , cervical dysplasia, ananorectal condyloma and vulvar intraepithelial neoplasia 2. Dr. Db Urbina excised a patch if high grade perianal dysplasia in the right anterolateral position in . 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 MRI without pelvic or inguinal lymphadenopathy. She completed chemoradiation on 18 and presents today or follow up.  CT CAP and MRI Pelvis on 8/10/2018 without any evidence of recurrence.    Physical examination:  Examination was chaperoned by Vianney Worthy NP     Vitals: There were no vitals taken for this visit.  BMI= There is no height or weight on file to calculate BMI.    Alert, oriented, in no acute distress, sitting comfortably. No palpable inguinal lymph nodes. Perianal skin intact with small skin tags present. No visible or palpable lesions. ITZEL with some palpable scarring in the left anterior position. Anoscopy with small telangectasia and scarring present but no lesions.  There is a well healed right anterior perianal scar measuring ~10 mm in diameter with no visible recurrent dysplasia or cancer.    Laboratory data:    Recent Labs   Lab Test 19  1044  14  1603   WBC 5.6   < > 8.7   HGB 13.5   < > 14.4      < > 258   CR 1.31*   < > 1.60*   ALBUMIN 3.4   < > 4.7   BILITOTAL 0.4   < > 0.4   ALKPHOS 120   < > 153*   ALT 20   < > 21   AST 16   < > 24   INR  --   --  0.94    < > = values in this interval not displayed.        Assessment/plan:   No evidence of recurrence on exam today. Repeat anoscopy in 4 months. Continue to follow with oncology with Dr. Tucker and she is scheduled in February with CT at that time. Encouraged the patient to contact the clinic in the meantime with any questions or concerns. Patient's questions were answered to her stated satisfaction and she is in agreement with this plan.    Total face to face time was 15 minutes, >50% counseling.    For details of past medical history, surgical history, family history, medications, allergies, and review of systems, please see details below.    Medical history:  Past Medical History:   Diagnosis Date     Abnormal coagulation profile     p [id number] heterozygote      Age-related osteoporosis without current pathological fracture 6/22/2019     Anemia      Antiplatelet or antithrombotic long-term use      ASCUS with positive high risk HPV 2007, 2015    + HPV 56, 54,& 6, colp - TAL III, Leep =TAL II     Basal cell carcinoma      Hypertension      Immunosuppressed status (H)     due meds     Kidney replaced by transplant 9/04    Living donor recipient,  Rejection 7/2005     LSIL (low grade squamous intraepithelial lesion) on Pap smear 4/2013    +HPV 33 or 45, 61       PAD (peripheral artery disease) (H)      PONV (postoperative nausea and vomiting)      Squamous cell lung cancer (H)      Thrombosis of leg 1967     Unspecified disorder of kidney and ureter     X-linked dominant Alport's syndrome.       Surgical history:  Past Surgical History:   Procedure Laterality Date     BIOPSY ANAL N/A 3/14/2018    Procedure: BIOPSY ANAL;;  Surgeon: Shabbir Leo MD;  [facility]     C NONSPECIFIC PROCEDURE      Thrombectomy     C NONSPECIFIC PROCEDURE  1955 and 1959    Bilater eye surgery - correction for crossed eyes     C NONSPECIFIC PROCEDURE  1998    oopherectomy L     C NONSPECIFIC PROCEDURE  1967    open kidney biopsy - L     C TRANSPLANTATION OF KIDNEY  9/04    recipient  -- done at [facility]     COLONOSCOPY       COLONOSCOPY N/A 8/9/2017    Procedure: COMBINED COLONOSCOPY, SINGLE OR MULTIPLE BIOPSY/POLYPECTOMY BY BIOPSY;;  Surgeon: Sushil Hyatt MD;  [facility]     COLPOSCOPY,LOOP ELECTRD CERVIX EXCIS  03/11/08    TAL II     CONIZATION LEEP  7/17/2013    Procedure: CONIZATION LEEP;;  Surgeon: Liliana Renteria MD;  [facility]     CONIZATION LEEP N/A 8/17/2016    Procedure: CONIZATION LEEP;  Surgeon: Liliana Renteria MD;  [facility]     EXAM UNDER ANESTHESIA ANUS  7/15/2014    Procedure: EXAM UNDER ANESTHESIA ANUS;  Surgeon: Radha Musa MD;  [facility]     EXAM UNDER ANESTHESIA ANUS N/A 3/14/2018    Procedure: EXAM UNDER ANESTHESIA ANUS;  Anal Exam Under Anesthesia With Excision of anal lesion, proctoscopy;  Surgeon: Shabbir Leo MD;  [facility]     EYE SURGERY       LASER CO2 EXCISE VULVA WIDE LOCAL  7/15/2014    Procedure: LASER CO2 EXCISE VULVA WIDE LOCAL;  Surgeon: Liliana Renteria MD;  [facility]     LASER CO2 VAGINA  7/17/2013    Procedure: LASER CO2 VAGINA;;  Surgeon: Liliana Renteria MD;  [facility]     LASER CO2 VAGINA N/A 9/25/2018    Procedure: LASER CO2 VAGINA;  Exam Under Anesthesia, CO2 Laser Ablation of Upper Vagina and Cervix;  Surgeon: Pati Garcia MD;  [facility]     MICROSCOPY ANAL  7/17/2013    Procedure: MICROSCOPY ANAL;  Anal Microscopy,  EUA vagina,Colposcopy Of Vagina And Vulva, Vaginal Biopsies, Omniguide Co2 Laser To Vagina and vulva, Loop Electrosurgical Excision Procedure To Cervix;  Surgeon: Radha Musa MD;  [facility]     MICROSCOPY ANAL  7/15/2014    Procedure: MICROSCOPY ANAL;  Surgeon: Radha Musa MD;  [facility]       Family history:  Family History   Problem Relation Age of Onset     Diabetes Father         type 2 diag age,60's     Alcohol/Drug Father      Arthritis Father      Hypertension Father      Lipids Father         high  cholesterol     Arthritis Mother      Diabetes Mother      Depression Mother      Heart Disease Mother      Neurologic Disorder Mother      Obesity Mother      Psychotic Disorder Mother      Thyroid Disease Mother      Gynecology Sister         Precancerous cell removal from cervix at age 45     Depression Sister      Allergies Sister      Alcohol/Drug Sister      Neurologic Disorder Sister      Cerebrovascular Disease Paternal Grandmother          of a stroke in her 80's     Diabetes Paternal Grandmother      Alcohol/Drug Son      Colon Polyps Sister      Colon Cancer No family hx of      Crohn's Disease No family hx of      Ulcerative Colitis No family hx of      Melanoma No family hx of      Skin Cancer No family hx of        Medications:  Current Outpatient Medications   Medication Sig Dispense Refill     ACETAMINOPHEN PO Take 1-2 tablets by mouth every 8 hours as needed for pain       acetaminophen-codeine (TYLENOL WITH CODEINE #3) 300-30 MG per tablet Take 1-2 tablets by mouth every 6 hours as needed for pain 50 tablet 0     amoxicillin (AMOXIL) 500 MG capsule Take 4 capsules (2,000 mg) by mouth once as needed Prior to dental procedures 16 capsule 3     atorvastatin (LIPITOR) 20 MG tablet TAKE ONE TABLET BY MOUTH EVERY DAY 30 tablet 1     calcium carbonate 600 mg-vitamin D 400 units (CALTRATE) 600-400 MG-UNIT per tablet Take 1 tablet by mouth 2 times daily       cilostazol (PLETAL) 100 MG tablet TAKE ONE TABLET BY MOUTH TWICE A DAY 60 tablet 6     clopidogrel (PLAVIX) 75 MG tablet TAKE ONE TABLET BY MOUTH EVERY DAY 30 tablet 6     Lactobacillus-Inulin ([facility] DIGESTIVE [facility]) CAPS Take 1 capsule by mouth daily 90 capsule 3     losartan (COZAAR) 25 MG tablet Take 1 tablet (25 mg) by mouth daily 90 tablet 5     metoprolol tartrate (LOPRESSOR) 100 MG tablet TAKE ONE TABLET BY MOUTH TWICE A  tablet 1     metoprolol tartrate (LOPRESSOR) 100 MG tablet TAKE ONE TABLET BY MOUTH TWICE A  tablet 1      NIFEdipine ER OSMOTIC (PROCARDIA XL) 90 MG 24 hr tablet TAKE ONE TABLET BY MOUTH EVERY DAY (Patient not taking: Reported on 9/10/2019) 30 tablet 6     NIFEdipine ER OSMOTIC (PROCARDIA XL) 90 MG 24 hr tablet TAKE ONE TABLET BY MOUTH EVERY DAY 30 tablet 5     predniSONE (DELTASONE) 5 MG tablet TAKE ONE TABLET BY MOUTH EVERY DAY 90 tablet 3     sirolimus (GENERIC EQUIVALENT) 1 MG tablet Take 2 tablets (2 mg) by mouth daily 180 tablet 3       Allergies:  The patientis allergic to ultracet; fentanyl; and hydrocodone.    Social history:  Social History     Tobacco Use     Smoking status: Former Smoker     Packs/day: 0.30     Years: 35.00     Pack years: 10.50     Types: Cigarettes     Start date: 1/1/1967     Smokeless tobacco: Never Used     Tobacco comment: occ cig   Substance Use Topics     Alcohol use: Yes     Alcohol/week: 0.0 standard drinks     Frequency: Monthly or less     Drinks per session: 1 or 2     Binge frequency: Less than monthly     Comment: rarely     Marital status: .    Review of Systems:  There are no exam notes on file for this visit.         Shabbir Leo MD   Professor and Chief  Division of Colon and Rectal Surgery  [facility]      Referring Provider:  Shabbir Leo MD  [address] [place] [address] [address]  [place] [address]     Primary Care Provider:  Lisa Martinez    This note was created using speech recognition software and may contain unintended word substitutions.

## 2019-10-01 ENCOUNTER — HEALTH MAINTENANCE LETTER (OUTPATIENT)
Age: 69
End: 2019-10-01

## 2019-10-01 ENCOUNTER — OFFICE VISIT (OUTPATIENT)
Dept: SURGERY | Facility: CLINIC | Age: 69
End: 2019-10-01
Payer: COMMERCIAL

## 2019-10-01 ENCOUNTER — DOCUMENTATION ONLY (OUTPATIENT)
Dept: CARE COORDINATION | Facility: CLINIC | Age: 69
End: 2019-10-01

## 2019-10-01 DIAGNOSIS — M81.0 OSTEOPOROSIS, UNSPECIFIED OSTEOPOROSIS TYPE, UNSPECIFIED PATHOLOGICAL FRACTURE PRESENCE: ICD-10-CM

## 2019-10-01 DIAGNOSIS — C21.1 MALIGNANT NEOPLASM OF ANAL CANAL (H): Primary | ICD-10-CM

## 2019-10-01 DIAGNOSIS — R60.9 EDEMA, UNSPECIFIED TYPE: ICD-10-CM

## 2019-10-01 DIAGNOSIS — E83.50 DISORDER OF CALCIUM METABOLISM: ICD-10-CM

## 2019-10-01 LAB
CALCIUM 24H UR-MRATE: <0.1 G/24 H (ref 0.1–0.3)
CALCIUM UR-MCNC: <5 MG/DL
CALCIUM/CREAT UR: ABNORMAL G/G CR
COLLECT DURATION TIME UR: 23 H
CREAT 24H UR-MRATE: 0.72 G/(24.H) (ref 0.8–1.8)
CREAT UR-MCNC: 37 MG/DL
SPECIMEN VOL UR: 1870 ML

## 2019-10-01 NOTE — LETTER
10/1/2019       RE: Maribel Yang  [address]  [facility] [address]     Dear Colleague,    Thank you for referring your patient, Maribel Yang, to the [facility] COLON AND RECTAL SURGERY at [facility]. Please see a copy of my visit note below.    Colon and Rectal Surgery Clinic Note    RE: Maribel Yang  : 1950  JESSEE: 10/1/2019    Maribel is a 67 year old female who presents today for follow up of her anal cancer.    HPI: Marbiel has a past medical history of kidney transplant in , lung cancer in , cervical dysplasia, ananorectal condyloma and vulvar intraepithelial neoplasia 2. Dr. Db Urbina excised a patch if high grade perianal dysplasia in the right anterolateral position in . 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 MRI without pelvic or inguinal lymphadenopathy. She completed chemoradiation on 18 and presents today or follow up.  CT CAP and MRI Pelvis on 8/10/2018 without any evidence of recurrence.    Physical examination:  Examination was chaperoned by Vianney Worthy NP     Vitals: There were no vitals taken for this visit.  BMI= There is no height or weight on file to calculate BMI.    Alert, oriented, in no acute distress, sitting comfortably. No palpable inguinal lymph nodes. Perianal skin intact with small skin tags present. No visible or palpable lesions. ITZEL with some palpable scarring in the left anterior position. Anoscopy with small telangectasia and scarring present but no lesions.  There is a well healed right anterior perianal scar measuring ~10 mm in diameter with no visible recurrent dysplasia or cancer.    Laboratory data:    Recent Labs   Lab Test  08/19/19  1044  07/22/14  1603   WBC 5.6   < > 8.7   HGB 13.5   < > 14.4      < > 258   CR 1.31*   < > 1.60*   ALBUMIN 3.4   < > 4.7   BILITOTAL 0.4   < > 0.4   ALKPHOS 120   < > 153*   ALT 20   < > 21   AST 16   < > 24   INR  --   --  0.94    < > = values in this interval not displayed.       Assessment/plan:   No evidence of recurrence on exam today. Repeat anoscopy in 4 months. Continue to follow with oncology with Dr. Tucker and she is scheduled in February with CT at that time. Encouraged the patient to contact the clinic in the meantime with any questions or concerns. Patient's questions were answered to her stated satisfaction and she is in agreement with this plan.    Total face to face time was 15 minutes, >50% counseling.    For details of past medical history, surgical history, family history, medications, allergies, and review of systems, please see details below.    Medical history:  Past Medical History:   Diagnosis Date     Abnormal coagulation profile     p [id number] heterozygote      Age-related osteoporosis without current pathological fracture 6/22/2019     Anemia      Antiplatelet or antithrombotic long-term use      ASCUS with positive high risk HPV 2007, 2015    + HPV 56, 54,& 6, colp - TAL III, Leep =TAL II     Basal cell carcinoma      Hypertension      Immunosuppressed status (H)     due meds     Kidney replaced by transplant 9/04    Living donor recipient,  Rejection 7/2005     LSIL (low grade squamous intraepithelial lesion) on Pap smear 4/2013    +HPV 33 or 45, 61       PAD (peripheral artery disease) (H)      PONV (postoperative nausea and vomiting)      Squamous cell lung cancer (H)      Thrombosis of leg 1967     Unspecified disorder of kidney and ureter     X-linked dominant Alport's syndrome.       Surgical history:  Past Surgical History:   Procedure Laterality Date     BIOPSY ANAL N/A 3/14/2018    Procedure: BIOPSY ANAL;;  Surgeon: Shabbir Leo MD;  Location:  OR       NONSPECIFIC PROCEDURE      Thrombectomy     C NONSPECIFIC PROCEDURE  1955 and 1959    Bilater eye surgery - correction for crossed eyes     C NONSPECIFIC PROCEDURE  1998    oopherectomy L     C NONSPECIFIC PROCEDURE  1967    open kidney biopsy - L     C TRANSPLANTATION OF KIDNEY  9/04    recipient -- done at [facility]     COLONOSCOPY       COLONOSCOPY N/A 8/9/2017    Procedure: COMBINED COLONOSCOPY, SINGLE OR MULTIPLE BIOPSY/POLYPECTOMY BY BIOPSY;;  Surgeon: Sushil Hyatt MD;  [facility]     COLPOSCOPY,LOOP ELECTRD CERVIX EXCIS  03/11/08    TAL II     CONIZATION LEEP  7/17/2013    Procedure: CONIZATION LEEP;;  Surgeon: Liliana Renteria MD;  [facility]     CONIZATION LEEP N/A 8/17/2016    Procedure: CONIZATION LEEP;  Surgeon: Liliana Renteria MD;  [facility]     EXAM UNDER ANESTHESIA ANUS  7/15/2014    Procedure: EXAM UNDER ANESTHESIA ANUS;  Surgeon: Radha Musa MD;  [facility]     EXAM UNDER ANESTHESIA ANUS N/A 3/14/2018    Procedure: EXAM UNDER ANESTHESIA ANUS;  Anal Exam Under Anesthesia With Excision of anal lesion, proctoscopy;  Surgeon: Shabbir Leo MD;  [facility]     EYE SURGERY       LASER CO2 EXCISE VULVA WIDE LOCAL  7/15/2014    Procedure: LASER CO2 EXCISE VULVA WIDE LOCAL;  Surgeon: Liliana Renteria MD;  [facility]     LASER CO2 VAGINA  7/17/2013    Procedure: LASER CO2 VAGINA;;  Surgeon: Liliana Renteria MD;  [facility]     LASER CO2 VAGINA N/A 9/25/2018    Procedure: LASER CO2 VAGINA;  Exam Under Anesthesia, CO2 Laser Ablation of Upper Vagina and Cervix;  Surgeon: Pati Garcia MD;  [facility]     MICROSCOPY ANAL  7/17/2013    Procedure: MICROSCOPY ANAL;  Anal Microscopy,  EUA vagina,Colposcopy Of Vagina And Vulva, Vaginal Biopsies, Omniguide Co2 Laser To Vagina and vulva, Loop Electrosurgical Excision Procedure To Cervix;  Surgeon: Radha Musa MD;  [facility]     MICROSCOPY ANAL  7/15/2014    Procedure:  MICROSCOPY ANAL;  Surgeon: Radha Musa MD;  [facility]       Family history:  Family History   Problem Relation Age of Onset     Diabetes Father         type 2 diag age,60's     Alcohol/Drug Father      Arthritis Father      Hypertension Father      Lipids Father         high cholesterol     Arthritis Mother      Diabetes Mother      Depression Mother      Heart Disease Mother      Neurologic Disorder Mother      Obesity Mother      Psychotic Disorder Mother      Thyroid Disease Mother      Gynecology Sister         Precancerous cell removal from cervix at age 45     Depression Sister      Allergies Sister      Alcohol/Drug Sister      Neurologic Disorder Sister      Cerebrovascular Disease Paternal Grandmother          of a stroke in her 80's     Diabetes Paternal Grandmother      Alcohol/Drug Son      Colon Polyps Sister      Colon Cancer No family hx of      Crohn's Disease No family hx of      Ulcerative Colitis No family hx of      Melanoma No family hx of      Skin Cancer No family hx of        Medications:  Current Outpatient Medications   Medication Sig Dispense Refill     ACETAMINOPHEN PO Take 1-2 tablets by mouth every 8 hours as needed for pain       acetaminophen-codeine (TYLENOL WITH CODEINE #3) 300-30 MG per tablet Take 1-2 tablets by mouth every 6 hours as needed for pain 50 tablet 0     amoxicillin (AMOXIL) 500 MG capsule Take 4 capsules (2,000 mg) by mouth once as needed Prior to dental procedures 16 capsule 3     atorvastatin (LIPITOR) 20 MG tablet TAKE ONE TABLET BY MOUTH EVERY DAY 30 tablet 1     calcium carbonate 600 mg-vitamin D 400 units (CALTRATE) 600-400 MG-UNIT per tablet Take 1 tablet by mouth 2 times daily       cilostazol (PLETAL) 100 MG tablet TAKE ONE TABLET BY MOUTH TWICE A DAY 60 tablet 6     clopidogrel (PLAVIX) 75 MG tablet TAKE ONE TABLET BY MOUTH EVERY DAY 30 tablet 6     Lactobacillus-Inulin ([facility] DIGESTIVE HEALTH) CAPS Take 1 capsule by mouth daily  90 capsule 3     losartan (COZAAR) 25 MG tablet Take 1 tablet (25 mg) by mouth daily 90 tablet 5     metoprolol tartrate (LOPRESSOR) 100 MG tablet TAKE ONE TABLET BY MOUTH TWICE A  tablet 1     metoprolol tartrate (LOPRESSOR) 100 MG tablet TAKE ONE TABLET BY MOUTH TWICE A  tablet 1     NIFEdipine ER OSMOTIC (PROCARDIA XL) 90 MG 24 hr tablet TAKE ONE TABLET BY MOUTH EVERY DAY (Patient not taking: Reported on 9/10/2019) 30 tablet 6     NIFEdipine ER OSMOTIC (PROCARDIA XL) 90 MG 24 hr tablet TAKE ONE TABLET BY MOUTH EVERY DAY 30 tablet 5     predniSONE (DELTASONE) 5 MG tablet TAKE ONE TABLET BY MOUTH EVERY DAY 90 tablet 3     sirolimus (GENERIC EQUIVALENT) 1 MG tablet Take 2 tablets (2 mg) by mouth daily 180 tablet 3       Allergies:  The patientis allergic to ultracet; fentanyl; and hydrocodone.    Social history:  Social History     Tobacco Use     Smoking status: Former Smoker     Packs/day: 0.30     Years: 35.00     Pack years: 10.50     Types: Cigarettes     Start date: 1/1/1967     Smokeless tobacco: Never Used     Tobacco comment: occ cig   Substance Use Topics     Alcohol use: Yes     Alcohol/week: 0.0 standard drinks     Frequency: Monthly or less     Drinks per session: 1 or 2     Binge frequency: Less than monthly     Comment: rarely     Marital status: .    Review of Systems:  There are no exam notes on file for this visit.     Shabbir Leo MD   Professor and Chief  Division of Colon and Rectal Surgery  [facility]    Referring Provider:  Shabbir Leo MD  [address] [address]     Primary Care Provider:  Lisa Martinez    This note was created using speech recognition software and may contain unintended word substitutions.

## 2019-10-01 NOTE — PATIENT INSTRUCTIONS
Follow up:    Schedule follow up with Dr. Leo in 4 months for flex sig    Please call with any questions or concerns regarding your clinic visit today.    It is a pleasure being involved in your health care.    Contacts post-consultation depending on your need:    Radiology Appointments [phone]    [facility] [phone]    Clinic Fax Number [phone]    Surgery Scheduling [phone]    My Chart is available 24 hours a day and is a secure way to access your records and communicate with your care team.  I strongly recommend signing up if you haven't already done so, if you are comfortable with computers.  If you would like to inquire about this or are having problems with My Chart access, you may call [phone] or go online at [email or web address]  Please allow at least 24 hours for a response and extra time on weekends and Holidays.

## 2019-10-01 NOTE — PROGRESS NOTES
"Colon and Rectal Surgery Clinic Note    RE: Maribel Yang  : 1950  JESSEE: 2018    Maribel is a 67 year old female who presents today for follow up of her anal cancer.    HPI: Maribel has a past medical history of kidney transplant in , lung cancer in , cervical dysplasia, ananorectal condyloma and vulvar intraepithelial neoplasia 2. She underwent High Resolution Anoscopy with biopsy showing superficially invasive squamous cell carcinoma. 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 MRI without pelvic or inguinal lymphadenopathy. She completed chemoradiation on 18 and presents today or follow up.  CT CAP and MRI Pelvis on 8/10/2018 without any evidence of recurrence.    Interval history: Maribel had difficulty with radiation but has been doing well since this was completed. She has some intermittent diarrhea. She is using a probiotic and occasional imodium but gets constipated when she takes imodium.    Physical examination:  Examination was chaperoned by Vianney Worthy NP.     Vitals: /58   Pulse 78   Temp 98.7  F (37.1  C) (Oral)   Ht 5' 2\"   Wt 101 lb 8 oz   SpO2 96%   BMI 18.56 kg/m    BMI= Body mass index is 18.56 kg/m .    Alert, oriented, in no acute distress, sitting comfortably. Perianal skin intact with small skin tags present. No visible or palpable lesions. ITZEL with some palpable scarring in the left anterior position. Anoscopy with small telangectasia and scarring present but no lesions.    Laboratory data:    Recent Labs   Lab Test  08/10/18   1414   14   1603   WBC  6.2   < >  8.7   HGB  13.3   < >  14.4   PLT  222   < >  258   CR  1.24*   < >  1.60*   ALBUMIN  3.2*   < >  4.7   BILITOTAL  0.3   < >  0.4   ALKPHOS  102   < >  153*   ALT "  18   < >  21   AST  14   < >  24   INR   --    --   0.94    < > = values in this interval not displayed.       Assessment/plan:  No evidence of recurrence on exam today. Repeat anoscopy in 4 months. Continue to follow with oncology with Dr. Tucker with planned repeat CT in 6 months. Recommended starting a daily fiber supplement for intermittent diarrhea. Encouraged the patient to contact the clinic in the meantime with any questions or concerns. Patient's questions were answered to her stated satisfaction and she is in agreement with this plan.    Total face to face time was 10 minutes, >50% counseling.    For details of past medical history, surgical history, family history, medications, allergies, and review of systems, please see details below.    Medical history:  Past Medical History:   Diagnosis Date     Abnormal coagulation profile     p [id number] heterozygote      Age-related osteoporosis without current pathological fracture 6/22/2019     Anemia      Antiplatelet or antithrombotic long-term use      ASCUS with positive high risk HPV 2007, 2015    + HPV 56, 54,& 6, colp - TAL III, Leep =TAL II     Basal cell carcinoma      Hypertension      Immunosuppressed status (H)     due meds     Kidney replaced by transplant 9/04    Living donor recipient,  Rejection 7/2005     LSIL (low grade squamous intraepithelial lesion) on Pap smear 4/2013    +HPV 33 or 45, 61       PAD (peripheral artery disease) (H)      PONV (postoperative nausea and vomiting)      Squamous cell lung cancer (H)      Thrombosis of leg 1967     Unspecified disorder of kidney and ureter     X-linked dominant Alport's syndrome.       Surgical history:  Past Surgical History:   Procedure Laterality Date     BIOPSY ANAL N/A 3/14/2018    Procedure: BIOPSY ANAL;;  Surgeon: Shabbir Leo MD;  [facility]      NONSPECIFIC PROCEDURE      Thrombectomy     C NONSPECIFIC PROCEDURE  1955 and 1959    Bilater eye surgery - correction for crossed eyes      C NONSPECIFIC PROCEDURE  1998    oopherectomy L     C NONSPECIFIC PROCEDURE  1967    open kidney biopsy - L     C TRANSPLANTATION OF KIDNEY  9/04    [facility]      COLONOSCOPY       COLONOSCOPY N/A 8/9/2017    Procedure: COMBINED COLONOSCOPY, SINGLE OR MULTIPLE BIOPSY/POLYPECTOMY BY BIOPSY;;  Surgeon: Sushil Hyatt MD;  [facility]     COLPOSCOPY,LOOP ELECTRD CERVIX EXCIS  03/11/08    TAL II     CONIZATION LEEP  7/17/2013    Procedure: CONIZATION LEEP;;  Surgeon: Liliana Renteria MD;  [facility]     CONIZATION LEEP N/A 8/17/2016    Procedure: CONIZATION LEEP;  Surgeon: Liliana Renteria MD;  [facility]     EXAM UNDER ANESTHESIA ANUS  7/15/2014    Procedure: EXAM UNDER ANESTHESIA ANUS;  Surgeon: Radha Musa MD;  [facility]     EXAM UNDER ANESTHESIA ANUS N/A 3/14/2018    Procedure: EXAM UNDER ANESTHESIA ANUS;  Anal Exam Under Anesthesia With Excision of anal lesion, proctoscopy;  Surgeon: Shabbir Leo MD;  [facility]     EYE SURGERY       LASER CO2 EXCISE VULVA WIDE LOCAL  7/15/2014    Procedure: LASER CO2 EXCISE VULVA WIDE LOCAL;  Surgeon: Liliana Renteria MD;  [facility]     LASER CO2 VAGINA  7/17/2013    Procedure: LASER CO2 VAGINA;;  Surgeon: Liliana Renteria MD;  [facility]     LASER CO2 VAGINA N/A 9/25/2018    Procedure: LASER CO2 VAGINA;  Exam Under Anesthesia, CO2 Laser Ablation of Upper Vagina and Cervix;  Surgeon: Pati Garcia MD;  [facility]     MICROSCOPY ANAL  7/17/2013    Procedure: MICROSCOPY ANAL;  Anal Microscopy,  EUA vagina,Colposcopy Of Vagina And Vulva, Vaginal Biopsies, Omniguide Co2 Laser To Vagina and vulva, Loop Electrosurgical Excision Procedure To Cervix;  Surgeon: Radha Musa MD;  [facility]     MICROSCOPY ANAL  7/15/2014    Procedure: MICROSCOPY ANAL;  Surgeon: Radha Musa MD;  [facility]       Family history:  Family History   Problem Relation Age of  Onset     Diabetes Father         type 2 diag age,60's     Alcohol/Drug Father      Arthritis Father      Hypertension Father      Lipids Father         high cholesterol     Arthritis Mother      Diabetes Mother      Depression Mother      Heart Disease Mother      Neurologic Disorder Mother      Obesity Mother      Psychotic Disorder Mother      Thyroid Disease Mother      Gynecology Sister         Precancerous cell removal from cervix at age 45     Depression Sister      Allergies Sister      Alcohol/Drug Sister      Neurologic Disorder Sister      Cerebrovascular Disease Paternal Grandmother          of a stroke in her 80's     Diabetes Paternal Grandmother      Alcohol/Drug Son      Colon Polyps Sister      Colon Cancer No family hx of      Crohn's Disease No family hx of      Ulcerative Colitis No family hx of      Melanoma No family hx of      Skin Cancer No family hx of        Medications:  Current Outpatient Medications   Medication Sig Dispense Refill     ACETAMINOPHEN PO Take 1-2 tablets by mouth every 8 hours as needed for pain       acetaminophen-codeine (TYLENOL WITH CODEINE #3) 300-30 MG per tablet Take 1-2 tablets by mouth every 6 hours as needed for pain 50 tablet 0     amoxicillin (AMOXIL) 500 MG capsule Take 4 capsules (2,000 mg) by mouth once as needed Prior to dental procedures 16 capsule 3     atorvastatin (LIPITOR) 20 MG tablet TAKE ONE TABLET BY MOUTH EVERY DAY 30 tablet 1     calcium carbonate 600 mg-vitamin D 400 units (CALTRATE) 600-400 MG-UNIT per tablet Take 1 tablet by mouth 2 times daily       cilostazol (PLETAL) 100 MG tablet TAKE ONE TABLET BY MOUTH TWICE A DAY 60 tablet 6     clopidogrel (PLAVIX) 75 MG tablet TAKE ONE TABLET BY MOUTH EVERY DAY 30 tablet 6     Lactobacillus-Inulin ([facility] DIGESTIVE HEALTH) CAPS Take 1 capsule by mouth daily 90 capsule 3     losartan (COZAAR) 25 MG tablet Take 1 tablet (25 mg) by mouth daily 90 tablet 5     metoprolol tartrate (LOPRESSOR) 100  MG tablet TAKE ONE TABLET BY MOUTH TWICE A  tablet 1     metoprolol tartrate (LOPRESSOR) 100 MG tablet TAKE ONE TABLET BY MOUTH TWICE A  tablet 1     NIFEdipine ER OSMOTIC (PROCARDIA XL) 90 MG 24 hr tablet TAKE ONE TABLET BY MOUTH EVERY DAY (Patient not taking: Reported on 9/10/2019) 30 tablet 6     NIFEdipine ER OSMOTIC (PROCARDIA XL) 90 MG 24 hr tablet TAKE ONE TABLET BY MOUTH EVERY DAY 30 tablet 5     predniSONE (DELTASONE) 5 MG tablet TAKE ONE TABLET BY MOUTH EVERY DAY 90 tablet 3     sirolimus (GENERIC EQUIVALENT) 1 MG tablet Take 2 tablets (2 mg) by mouth daily 180 tablet 3       Allergies:  The patientis allergic to ultracet; fentanyl; and hydrocodone.    Social history:  Social History     Tobacco Use     Smoking status: Former Smoker     Packs/day: 0.30     Years: 35.00     Pack years: 10.50     Types: Cigarettes     Start date: 1/1/1967     Smokeless tobacco: Never Used     Tobacco comment: occ cig   Substance Use Topics     Alcohol use: Yes     Alcohol/week: 0.0 standard drinks     Frequency: Monthly or less     Drinks per session: 1 or 2     Binge frequency: Less than monthly     Comment: rarely     Marital status: .    Review of Systems:  There are no exam notes on file for this visit.         Shabbir Leo MD   Professor and Chief  Division of Colon and Rectal Surgery  [facility]      Referring Provider:  Lisa Martinez DO  [address] [address]     Primary Care Provider:  Lisa Martinez

## 2019-10-06 LAB
COLLECT DURATION TIME SPEC: 23 H
CORTIS F 24H UR HPLC-MCNC: 2.13 UG/L
CORTIS F 24H UR-MRATE: 4.2 UG/D
CORTIS F/CREAT 24H UR: 5.76 UG/G CRT
CREAT 24H UR-MRATE: 722 MG/D (ref 500–1400)
CREAT UR-MCNC: 37 MG/DL
IMP & REVIEW OF LAB RESULTS: NORMAL
SPECIMEN VOL ?TM UR: 1870 ML

## 2019-10-07 ENCOUNTER — MYC MEDICAL ADVICE (OUTPATIENT)
Dept: ENDOCRINOLOGY | Facility: CLINIC | Age: 69
End: 2019-10-07

## 2019-10-07 DIAGNOSIS — M81.0 AGE-RELATED OSTEOPOROSIS WITHOUT CURRENT PATHOLOGICAL FRACTURE: Primary | ICD-10-CM

## 2019-10-07 RX ORDER — IBUPROFEN 200 MG
1 CAPSULE ORAL 2 TIMES DAILY
Qty: 60 TABLET | Refills: 3 | Status: SHIPPED | OUTPATIENT
Start: 2019-10-07 | End: 2021-04-02

## 2019-10-07 NOTE — RESULT ENCOUNTER NOTE
Dear Maribel,   The urine test showed very low calcium in the urine. This indicates that you do not have enough calcium in your diet.   With this finding, I recommend that you take Citracal 1 tab twice daily and plan to continue Prolia injection. I have send a prescription but generally you can find this over the counter.     Best regards,   Ambreen Moses MD  Endocrinology Service

## 2019-10-09 NOTE — PROGRESS NOTES
Consult Notes on Referred Patient    Date: 9/10/2019       Patient presents today for evaluation.     Her history is as follows:  Brief Cancer History:   1/07: + HPV 6 Low risk   10/07: ASCUS, + HPV 56, 54 & 6. 12/07: [place]: TAL II   3/11/08: LEEP - TAL II   8/08: ASCUS, ECC atypia, +HPV 82   9/08: [place] - Atypia   5/09: NIL pap, 11/09: NIL pap, neg HPV   4/13: LSIL, + HPV 33 or 45, 61.   5/15/13: [place] - VAIN II & III,TAL II. Referred to gyn onc.   6/20/13: [place] with gyn onc. Plan LEEP and CO2 laser to vagina, cx and vaginal vault.   7/17/13: [facility] in OR, Co2 laser vagina and vulva , YUNIOR 1- 2, AIN 2-3, VAIN 2-3, LEEP, ECC negative.  12/2/13: Colonoscopy negative. Pap ASC-H  5/8/14:  Pap ASCUS/AGC  5/22/14:  Pap ASCUS, Anal pap ASC-US, labial biopsy negative  7/15/14:  Pap LSIL, right & left vulvar biopsy HSIL, CO2 laser  4/2/15:  Pap ASCUS, vaginal suburethral biopsy VIN1-2  5/26/16:  Atypical glandular cells, pap     6/28/16:  EMB, ECC.  Endometrial biopsy with superficial atrophic benign endometrium.  ECC CIN3     8/17/16:  EUA, colposcopy vagina, LEEP, Vaginal biopsies.  LEEP CIN1, margins negative.  ECC with reactive change, vaginal biopsy VIN1       6/1/17: ECC + cervical biopsy negative, pap NILM + HPV     3/14/18:  Invasive anal squamous cell carcinoma, excised but close margins.    Underwent chemoradiation completed on 6/11/18.     9/4/18:   Pap ASC-H, vaginal bx VAIN2, HR HPV-    9/25/18:  CO2 laser ablation upper vagina and cervix    4/9/19:  Pap NILM, HR HPV negative    Patient returns today for follow-up.  Overall doing well, stable symptoms. Dealing with some chronic GI issues for which she is undergoing evaluation and dealing with weight loss.       Review of Systems:  Answers for HPI/ROS submitted by the patient on 9/10/2019   General Symptoms: No  Skin Symptoms: Yes  HENT Symptoms: No  EYE SYMPTOMS: No  HEART SYMPTOMS: No  LUNG SYMPTOMS: No  INTESTINAL SYMPTOMS: Yes  URINARY  SYMPTOMS: No  GYNECOLOGIC SYMPTOMS: No  BREAST SYMPTOMS: No  SKELETAL SYMPTOMS: No  BLOOD SYMPTOMS: No  NERVOUS SYSTEM SYMPTOMS: No  MENTAL HEALTH SYMPTOMS: No  Changes in hair: No  Changes in moles/birth marks: No  Itching: No  Rashes: No  Changes in nails: No  Acne: No  Hair in places you don't want it: No  Change in facial hair: No  Warts: No  Non-healing sores: Yes  Scarring: No  Flaking of skin: No  Color changes of hands/feet in cold : No  Sun sensitivity: No  Skin thickening: No  Heart burn or indigestion: No  Nausea: No  Vomiting: No  Abdominal pain: No  Bloating: Yes  Constipation: No  Diarrhea: Yes  Blood in stool: No  Black stools: No  Rectal or Anal pain: No  Fecal incontinence: No  Yellowing of skin or eyes: No  Vomit with blood: No  Change in stools: No      Past Medical History:    Past Medical History:   Diagnosis Date     Abnormal coagulation profile     p [id number] heterozygote      Age-related osteoporosis without current pathological fracture 6/22/2019     Anemia      Antiplatelet or antithrombotic long-term use      ASCUS with positive high risk HPV 2007, 2015    + HPV 56, 54,& 6, colp - TAL III, [place] =TAL II     Basal cell carcinoma      Hypertension      Immunosuppressed status (H)     due meds     Kidney replaced by transplant 9/04    Living donor recipient,  Rejection 7/2005     LSIL (low grade squamous intraepithelial lesion) on Pap smear 4/2013    +HPV 33 or 45, 61       PAD (peripheral artery disease) (H)      PONV (postoperative nausea and vomiting)      Squamous cell lung cancer (H)      Thrombosis of leg 1967     Unspecified disorder of kidney and ureter     X-linked dominant Alport's syndrome.         Past Surgical History:    Past Surgical History:   Procedure Laterality Date     BIOPSY ANAL N/A 3/14/2018    Procedure: BIOPSY ANAL;;  Surgeon: Shabbir Leo MD;  [facility]: [facility]     C NONSPECIFIC PROCEDURE      Thrombectomy     C NONSPECIFIC PROCEDURE  1955 and 1959    [place] eye  surgery - correction for crossed eyes     C NONSPECIFIC PROCEDURE  1998    oopherectomy L     C NONSPECIFIC PROCEDURE  1967    open kidney biopsy - L     C TRANSPLANTATION OF KIDNEY  9/04    recipient -- done [facility]     COLONOSCOPY       COLONOSCOPY N/A 8/9/2017    Procedure: COMBINED COLONOSCOPY, SINGLE OR MULTIPLE BIOPSY/POLYPECTOMY BY BIOPSY;;  Surgeon: Sushil Hyatt MD;  Location:  GI     COLPOSCOPY,LOOP ELECTRD CERVIX EXCIS  03/11/08    TAL II     CONIZATION LEEP  7/17/2013    Procedure: CONIZATION LEEP;;  Surgeon: Liliana Renteria MD;  [facility]     CONIZATION LEEP N/A 8/17/2016    Procedure: CONIZATION LEEP;  Surgeon: Liliana Renteria MD;  [facility]     EXAM UNDER ANESTHESIA ANUS  7/15/2014    Procedure: EXAM UNDER ANESTHESIA ANUS;  Surgeon: Radha Musa MD;  [facility]     EXAM UNDER ANESTHESIA ANUS N/A 3/14/2018    Procedure: EXAM UNDER ANESTHESIA ANUS;  Anal Exam Under Anesthesia With Excision of anal lesion, proctoscopy;  Surgeon: Shabbir Leo MD;  [facility]     EYE SURGERY       LASER CO2 EXCISE VULVA WIDE LOCAL  7/15/2014    Procedure: LASER CO2 EXCISE VULVA WIDE LOCAL;  Surgeon: Liliana Renteria MD;  Location:  OR     LASER CO2 VAGINA  7/17/2013    Procedure: LASER CO2 VAGINA;;  Surgeon: Liliana Renteria MD;  [facility]     LASER CO2 VAGINA N/A 9/25/2018    Procedure: LASER CO2 VAGINA;  Exam Under Anesthesia, CO2 Laser Ablation of Upper Vagina and Cervix;  Surgeon: Pati Garcia MD;  [facility]     MICROSCOPY ANAL  7/17/2013    Procedure: MICROSCOPY ANAL;  Anal Microscopy,  EUA vagina,Colposcopy Of Vagina And Vulva, Vaginal Biopsies, Omniguide Co2 Laser To Vagina and vulva, Loop Electrosurgical Excision Procedure To Cervix;  Surgeon: Radha Musa MD;  [facility]     MICROSCOPY ANAL  7/15/2014    Procedure: MICROSCOPY ANAL;  Surgeon: Radha Musa MD;  Location:  OR         [facility]  Maintenance:  Health Maintenance Due   Topic Date Due     ZOSTER IMMUNIZATION (1 of 2) 12/15/2002     ADVANCE CARE PLANNING  10/03/2017     PNEUMOCOCCAL IMMUNIZATION 65+ HIGH/HIGHEST RISK (2 of 2 - PPSV23) 06/10/2019     INFLUENZA VACCINE (1) 2019         Current Medications:     has a current medication list which includes the following prescription(s): acetaminophen, acetaminophen-codeine, amoxicillin, atorvastatin, calcium carbonate 600 mg-vitamin d 400 units, cilostazol, clopidogrel, culturelle digestive health, losartan, metoprolol tartrate, nifedipine er osmotic, prednisone, sirolimus, calcium citrate, metoprolol tartrate, and nifedipine er osmotic.       Allergies:     [unfilled]        Social History:     Social History     Tobacco Use     Smoking status: Former Smoker     Packs/day: 0.30     Years: 35.00     Pack years: 10.50     Types: Cigarettes     Start date: 1967     Smokeless tobacco: Never Used     Tobacco comment: occ cig   Substance Use Topics     Alcohol use: Yes     Alcohol/week: 0.0 standard drinks     Frequency: Monthly or less     Drinks per session: 1 or 2     Binge frequency: Less than monthly     Comment: rarely       History   Drug Use No           Family History:     The patient's family history is notable for :    Family History   Problem Relation Age of Onset     Diabetes Father         type 2 diag age,60's     Alcohol/Drug Father      Arthritis Father      Hypertension Father      Lipids Father         high cholesterol     Arthritis Mother      Diabetes Mother      Depression Mother      Heart Disease Mother      Neurologic Disorder Mother      Obesity Mother      Psychotic Disorder Mother      Thyroid Disease Mother      Gynecology Sister         Precancerous cell removal from cervix at age 45     Depression Sister      Allergies Sister      Alcohol/Drug Sister      Neurologic Disorder Sister      Cerebrovascular Disease Paternal Grandmother          of a stroke in her  80's     Diabetes Paternal Grandmother      Alcohol/Drug Son      Colon Polyps Sister      Colon Cancer No family hx of      Crohn's Disease No family hx of      Ulcerative Colitis No family hx of      Melanoma No family hx of      Skin Cancer No family hx of          Physical Exam:     BP (!) 151/93   Pulse 71   Temp 98.6  F (37  C) (Oral)   Resp 14   Wt 44 kg (96 lb 14.4 oz)   SpO2 91%   BMI 15.61 kg/m    Body mass index is 15.61 kg/m .    General Appearance: Thin, alert, no distress      Musculoskeletal: extremities non tender and without edema    Skin: no lesions or rashes     Neurological: normal gait, no gross defects     Psychiatric: appropriate mood and affect                               Hematological: normal cervical, supraclavicular and inguinal lymph nodes     Gastrointestinal:       abdomen soft, non-tender, non-distended, no organomegaly or masses    Colposcopy Note:     Written and verbal informed consent obtained.     Prior to the start of the procedure and with procedural staff participation, I verbally confirmed the patient s identity using two indicators, relevant allergies, that the procedure was appropriate and matched the consent or emergent situation, and that the correct equipment/implants were available. Immediately prior to starting the procedure I conducted the Time Out with the procedural staff and re-confirmed the patient s name, procedure, and site/side. (The Joint Commission universal protocol was followed.)  Yes     Sedation (Moderate or Deep): None     Genitourinary: External genitalia and urethral meatus appears normal, BUS negative, no lesions. Vagina is smooth without nodularity or masses.  Vaginal apex and cervix scarred from multiple procedures.     After placement of speculum and full visualization of cervix, colposcopy of cervix and entire vagina performed.  Entire cervix and upper vagina visualized, no gross lesions. Pap smear obtained.  Cervix clean with saline and  green filter applied with increased vascularity at 12 o'clock noted.  5% acetic acid solution applied to cervix and visualized under colposcopic enhancement.  Small os, TMZ not seen. Acetowhite changes at 12 o'clock although no discrete lesion.  ECC and cx biopsy done.    Speculum removed and colposcopy of external genitalia and vulva done.  Gauze soaked with 5% acetic acid applied and viewed under colposcopic enhancement.  No discrete lesions.  No biopsies.      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           Plan:      1.)        Long standing history of cervical and vulvar dysplasia:  Pap and biopsies done today.  She will be contacted with results and recs for follow-up.        2.)        Anal cancer:  Status post surgery, chemoXRT. Follow-up with Medical Oncology and CR.       3.)        Labs and/or tests ordered include:  Pap, biopsy     Pati Garcia MD  Gynecologic Oncology  [facility]  CC  Patient Care Team:  Lisa Martinez DO as PCP - General (Family Practice)  Hitesh See MD as MD (Nephrology)  Nyasia Gaines MD as Referring Physician (OB/Gyn)  Joel Davis MD as MD (Family Practice)  Rosalie Pelletier PA-C as Physician Assistant (Physician Assistant)  Lisa Martinez DO as Assigned PCP  Liliana Renteria MD as MD (Oncology)  Meggan Tucker MD as MD (Hematology & Oncology)  Kianna Dyer, AVERY as Nurse Coordinator (Hematology & Oncology)  Leelee Evans MD as MD (INTERNAL MEDICINE - ENDOCRINOLOGY, DIABETES & METABOLISM)  Ambreen Moses MD as MD (INTERNAL MEDICINE - ENDOCRINOLOGY, DIABETES & METABOLISM)  SELF, REFERRED

## 2019-10-14 NOTE — TELEPHONE ENCOUNTER
Prolia is covered and approved.  Left detailed message on pts vm with review and recommendation regarding prolia or alternatives and for Pt to call and let us know what her preference is.   Giulia Hernandez RN on 10/14/2019 at 2:16 PM

## 2019-10-14 NOTE — TELEPHONE ENCOUNTER
Cannot afford Prolia  Other options could be Fosamax / Reclast if GFR allows.   If she cannot afford it, plan to recall in clinic to discuss other options.   Ambreen Moses MD  Endocrinology Service

## 2019-10-17 DIAGNOSIS — E78.00 HYPERCHOLESTEREMIA: ICD-10-CM

## 2019-10-18 NOTE — TELEPHONE ENCOUNTER
"Due for fasting labs.  Last physical 3/2019  [employer] message sent to patient.  Monse Hopson RN      Requested Prescriptions   Pending Prescriptions Disp Refills     atorvastatin (LIPITOR) 20 MG tablet [Pharmacy Med Name: ATORVASTATIN CALCIUM 20MG TABS] 30 tablet 1     Sig: TAKE ONE TABLET BY MOUTH EVERY DAY       Statins Protocol Failed - 10/17/2019  2:13 PM        Failed - LDL on file in past 12 months     Recent Labs   Lab Test 08/10/18  1414   LDL 71             Passed - No abnormal creatine kinase in past 12 months     No lab results found.             Passed - Recent (12 mo) or future (30 days) visit within the authorizing provider's specialty     Patient has had an office visit with the authorizing provider or a provider within the authorizing providers department within the previous 12 mos or has a future within next 30 days. See \"Patient Info\" tab in inbasket, or \"Choose Columns\" in Meds & Orders section of the refill encounter.              Passed - Medication is active on med list        Passed - Patient is age 18 or older        Passed - No active pregnancy on record        Passed - No positive pregnancy test in past 12 months        "

## 2019-10-22 NOTE — TELEPHONE ENCOUNTER
See MyChart - triage trying to help pt schedule for this month  Pt states she has lab appt for 2/13/2020  Dannielle DSOUZA RN

## 2019-10-23 NOTE — TELEPHONE ENCOUNTER
Patient has not read MyChart  Left non detailed VM for pt asking that they callback and schedule sooner lab appt  Dannielle DSOUZA RN

## 2019-10-24 RX ORDER — ATORVASTATIN CALCIUM 20 MG/1
20 TABLET, FILM COATED ORAL DAILY
Qty: 90 TABLET | Refills: 0 | Status: SHIPPED | OUTPATIENT
Start: 2019-10-24 | End: 2020-01-15

## 2019-10-24 RX ORDER — ATORVASTATIN CALCIUM 20 MG/1
TABLET, FILM COATED ORAL
Qty: 30 TABLET | Refills: 0 | Status: SHIPPED | OUTPATIENT
Start: 2019-10-24 | End: 2019-10-24

## 2019-10-24 NOTE — TELEPHONE ENCOUNTER
PN,   Please see below messages/refill  Pt hasn't scheduled sooner lab appt  Dannielle DSOUZA RN

## 2019-11-22 DIAGNOSIS — I12.9 RENAL HYPERTENSION, STAGE 1-4 OR UNSPECIFIED CHRONIC KIDNEY DISEASE: ICD-10-CM

## 2019-11-22 NOTE — TELEPHONE ENCOUNTER
"Requested Prescriptions   Pending Prescriptions Disp Refills     losartan (COZAAR) 25 MG tablet  Last Written Prescription Date:  10/30/2018  Last Fill Quantity: 90 tab,  # refills: 5   Last Office Visit: 3/13/2019 Bart  Future Office Visit:      90 tablet 5     Sig: Take 1 tablet (25 mg) by mouth daily       Angiotensin-II Receptors Failed - 11/22/2019 10:02 AM        Failed - Last blood pressure under 140/90 in past 12 months     BP Readings from Last 3 Encounters:   09/10/19 (!) 151/93   08/19/19 (!) 151/77   08/07/19 138/69                 Failed - Normal serum creatinine on file in past 12 months     Recent Labs   Lab Test 08/19/19  1044  04/27/12  1522   CR 1.31*   < >  --    CREAT  --   --  1.7*    < > = values in this interval not displayed.             Passed - Recent (12 mo) or future (30 days) visit within the authorizing provider's specialty     Patient has had an office visit with the authorizing provider or a provider within the authorizing providers department within the previous 12 mos or has a future within next 30 days. See \"Patient Info\" tab in inbasket, or \"Choose Columns\" in Meds & Orders section of the refill encounter.              Passed - Medication is active on med list        Passed - Patient is age 18 or older        Passed - No active pregnancy on record        Passed - Normal serum potassium on file in past 12 months     Recent Labs   Lab Test 08/19/19  1044   POTASSIUM 3.6                    Passed - No positive pregnancy test in past 12 months        "

## 2019-11-25 RX ORDER — LOSARTAN POTASSIUM 25 MG/1
25 TABLET ORAL DAILY
Qty: 90 TABLET | Refills: 5 | Status: SHIPPED | OUTPATIENT
Start: 2019-11-25 | End: 2020-05-29

## 2019-11-25 NOTE — TELEPHONE ENCOUNTER
PN    Routing refill request to provider for review/approval because:  Failed - Last blood pressure under 140/90 in past 12 months             BP Readings from Last 3 Encounters:   09/10/19 (!) 151/93   08/19/19 (!) 151/77   08/07/19 138/69        Failed - Normal serum creatinine on file in past 12 months               Recent Labs   Lab Test 08/19/19  1044   04/27/12  1522   CR 1.31*   < >  --    CREAT  --   --  1.7*        Thanks,  Monse Hopson RN

## 2019-11-25 NOTE — TELEPHONE ENCOUNTER
"Routing to nephrologist to advise   Nephrology has been managing this refill  Dannielle DSOUZA RN    Last Written Prescription Date:  10/30/2018  Last Fill Quantity: 90,  # refills: 5   Last office visit: 3/13/2019 with prescribing provider:     Future Office Visit:    Requested Prescriptions   Pending Prescriptions Disp Refills     losartan (COZAAR) 25 MG tablet 90 tablet 5     Sig: Take 1 tablet (25 mg) by mouth daily       Angiotensin-II Receptors Failed - 11/22/2019  2:54 PM        Failed - Last blood pressure under 140/90 in past 12 months     BP Readings from Last 3 Encounters:   09/10/19 (!) 151/93   08/19/19 (!) 151/77   08/07/19 138/69                 Failed - Normal serum creatinine on file in past 12 months     Recent Labs   Lab Test 08/19/19  1044  04/27/12  1522   CR 1.31*   < >  --    CREAT  --   --  1.7*    < > = values in this interval not displayed.             Passed - Recent (12 mo) or future (30 days) visit within the authorizing provider's specialty     Patient has had an office visit with the authorizing provider or a provider within the authorizing providers department within the previous 12 mos or has a future within next 30 days. See \"Patient Info\" tab in inbasket, or \"Choose Columns\" in Meds & Orders section of the refill encounter.              Passed - Medication is active on med list        Passed - Patient is age 18 or older        Passed - No active pregnancy on record        Passed - Normal serum potassium on file in past 12 months     Recent Labs   Lab Test 08/19/19  1044   POTASSIUM 3.6                    Passed - No positive pregnancy test in past 12 months        "

## 2019-11-26 RX ORDER — LOSARTAN POTASSIUM 25 MG/1
25 TABLET ORAL DAILY
Qty: 90 TABLET | Refills: 0 | Status: SHIPPED | OUTPATIENT
Start: 2019-11-26 | End: 2020-02-21

## 2019-12-10 ENCOUNTER — TELEPHONE (OUTPATIENT)
Dept: SURGERY | Facility: CLINIC | Age: 69
End: 2019-12-10

## 2019-12-12 ENCOUNTER — TELEPHONE (OUTPATIENT)
Dept: SURGERY | Facility: CLINIC | Age: 69
End: 2019-12-12

## 2019-12-16 ENCOUNTER — TELEPHONE (OUTPATIENT)
Dept: SURGERY | Facility: CLINIC | Age: 69
End: 2019-12-16

## 2019-12-28 ENCOUNTER — TELEPHONE (OUTPATIENT)
Dept: TRANSPLANT | Facility: CLINIC | Age: 69
End: 2019-12-28

## 2019-12-28 DIAGNOSIS — Z94.0 KIDNEY REPLACED BY TRANSPLANT: Primary | ICD-10-CM

## 2019-12-28 RX ORDER — SIROLIMUS 1 MG/1
2 TABLET, FILM COATED ORAL DAILY
Qty: 6 TABLET | Refills: 0 | Status: SHIPPED | OUTPATIENT
Start: 2019-12-28 | End: 2021-04-02

## 2020-01-15 DIAGNOSIS — E78.00 HYPERCHOLESTEREMIA: ICD-10-CM

## 2020-01-15 RX ORDER — ATORVASTATIN CALCIUM 20 MG/1
TABLET, FILM COATED ORAL
Qty: 90 TABLET | Refills: 0 | Status: SHIPPED | OUTPATIENT
Start: 2020-01-15 | End: 2020-02-21

## 2020-01-15 NOTE — TELEPHONE ENCOUNTER
"Requested Prescriptions   Pending Prescriptions Disp Refills     atorvastatin (LIPITOR) 20 MG tablet [Pharmacy Med Name: ATORVASTATIN CALCIUM 20MG TABS] 90 tablet 0     Sig: TAKE ONE TABLET BY MOUTH EVERY DAY       Statins Protocol Failed - 1/15/2020  2:20 PM        Failed - LDL on file in past 12 months     Recent Labs   Lab Test 08/10/18  1414   LDL 71             Passed - No abnormal creatine kinase in past 12 months     No lab results found.             Passed - Recent (12 mo) or future (30 days) visit within the authorizing provider's specialty     Patient has had an office visit with the authorizing provider or a provider within the authorizing providers department within the previous 12 mos or has a future within next 30 days. See \"Patient Info\" tab in inbasket, or \"Choose Columns\" in Meds & Orders section of the refill encounter.              Passed - Medication is active on med list        Passed - Patient is age 18 or older        Passed - No active pregnancy on record        Passed - No positive pregnancy test in past 12 months        See MyChart encounter 8/21/19, ok'd by PCP to wait to have lipids drawn in February 2020.    Filled 90 day supply.  Aura Stiles RN    "

## 2020-01-17 ENCOUNTER — TELEPHONE (OUTPATIENT)
Dept: FAMILY MEDICINE | Facility: CLINIC | Age: 70
End: 2020-01-17

## 2020-01-17 NOTE — TELEPHONE ENCOUNTER
PN    Patient called.  States since last evening she has been having stomach cramps off and on which go across her abdomen, cramping starts then she gets urge to defecate and when gets to toilet can't go, nothing comes out.     Has been occurring off and on all day.  States she had diarrhea 2 days ago.  Last Normal BM was Monday 1/13  Reports some nausea, no vomiting. No appetite, drinking Gatorade.  Reports having the chills, Temp 99.    Please advise.  Thanks,  Monse Hopson RN

## 2020-01-17 NOTE — TELEPHONE ENCOUNTER
Reason for call:  Patient reporting a symptom    Symptom or request: pt is experiencing chills with severe stomach cramps nausea and constipation, finally had a bowel movement and it consistent with blood tissue    Duration (how long have symptoms been present): yesterday    Have you been treated for this before? No    Additional comments: Please call and advise patient on care.    Phone Number patient can be reached at:  Cell number on file:    Telephone Information:   Mobile [phone]       Best Time:  anytime    Can we leave a detailed message on this number:  YES    Call taken on 1/17/2020 at 12:55 PM by Rosanna Khan

## 2020-01-31 ENCOUNTER — TELEPHONE (OUTPATIENT)
Dept: ENDOCRINOLOGY | Facility: CLINIC | Age: 70
End: 2020-01-31

## 2020-01-31 NOTE — TELEPHONE ENCOUNTER
LVM for pt to c/b to schedule 1st available ZANA with any provider since Dr Moses left clinic.    Patient needs Prolia scheduled for February also. Transfer to [facility] to set that up. Their number is [phone] option #3, then option #2.

## 2020-01-31 NOTE — TELEPHONE ENCOUNTER
Pt was to have seen Dr Moses in 12/2019, appt cancelled. Sent to clinic coordinators to schedule with new provider.   Sent to covering provider to sign Prolia orders; needed to schedule 02/2020 Infusion.   Giulia Hernandez, RN on 1/31/2020 at 1:16 PM       PLEASE HELP SCHEDULE THE FOLLOWING APPT(S): Return Appointment    TIME FRAME NEEDED BY: First available.       SCHEDULING COMMENTS (optional): needs new provider, overdue for appt.       Melanie Hayes  [facility] Endo Triage- 1 minute ago (1:09 PM)      This pt's prolia injection orders were written by Dr. Moses - I believe we've been told by the infusion center that the orders have to be written by a current MD.              Maribel Yang, Ambreen Medina MD 4 hours ago (8:36 AM)         Is it possible that I can have the Prolia injection on Feb 13 when I   Come in for labs?   Ambreen Shelton MD   to Maribel Yang           1:23 PM   Hi Ms. Yang,     You had already started Prolia infusion and first dose was done in August. Second one is due in February, did your insurance plan change this year ? Just to make sure that we are not confusing this with other medication. If you cannot afford Prolia, then plan would be do come back to clinic to discuss other options     Also, the only protein that I saw in your chart was Beta 2 microglobulin, but that was ordered by Dr Tucker. I am just wondering if this is what you are referring to. I am not following this lab test.     The 24 hour urine was done to assess the calcium and cortisol levels.     Best regards,   Ambreen Moses MD   Endocrinology Service

## 2020-01-31 NOTE — TELEPHONE ENCOUNTER
Dr Moses's Prolia therapy plan discontinued and new therapy plan entered. Please help her get scheduled as usual.  Agree she should be scheduled for follow up with endocrine provider.  Krista Dubois MD

## 2020-02-04 NOTE — TELEPHONE ENCOUNTER
Instructions  On how to  schedule Prolia injection was  sent to Maribel on my chart. The PA will not be done until it is on the schedule.Sharon Rothman RN on 2/4/2020 at 9:08 AM

## 2020-02-13 ENCOUNTER — ANCILLARY PROCEDURE (OUTPATIENT)
Dept: CT IMAGING | Facility: CLINIC | Age: 70
End: 2020-02-13
Attending: INTERNAL MEDICINE
Payer: COMMERCIAL

## 2020-02-13 DIAGNOSIS — D47.2 MGUS (MONOCLONAL GAMMOPATHY OF UNKNOWN SIGNIFICANCE): ICD-10-CM

## 2020-02-13 DIAGNOSIS — E78.2 MIXED HYPERLIPIDEMIA: ICD-10-CM

## 2020-02-13 DIAGNOSIS — C21.1 MALIGNANT NEOPLASM OF ANAL CANAL (H): ICD-10-CM

## 2020-02-13 LAB
ALBUMIN SERPL-MCNC: 3.3 G/DL (ref 3.4–5)
ALP SERPL-CCNC: 83 U/L (ref 40–150)
ALT SERPL W P-5'-P-CCNC: 26 U/L (ref 0–50)
ANION GAP SERPL CALCULATED.3IONS-SCNC: 5 MMOL/L (ref 3–14)
AST SERPL W P-5'-P-CCNC: 16 U/L (ref 0–45)
BASOPHILS # BLD AUTO: 0 10E9/L (ref 0–0.2)
BASOPHILS NFR BLD AUTO: 0.5 %
BILIRUB SERPL-MCNC: 0.3 MG/DL (ref 0.2–1.3)
BUN SERPL-MCNC: 26 MG/DL (ref 7–30)
CALCIUM SERPL-MCNC: 9.6 MG/DL (ref 8.5–10.1)
CHLORIDE SERPL-SCNC: 111 MMOL/L (ref 94–109)
CHOLEST SERPL-MCNC: 172 MG/DL
CO2 SERPL-SCNC: 26 MMOL/L (ref 20–32)
CREAT SERPL-MCNC: 1.35 MG/DL (ref 0.52–1.04)
DIFFERENTIAL METHOD BLD: ABNORMAL
EOSINOPHIL # BLD AUTO: 0.2 10E9/L (ref 0–0.7)
EOSINOPHIL NFR BLD AUTO: 3.7 %
ERYTHROCYTE [DISTWIDTH] IN BLOOD BY AUTOMATED COUNT: 14.7 % (ref 10–15)
GFR SERPL CREATININE-BSD FRML MDRD: 40 ML/MIN/{1.73_M2}
GLUCOSE SERPL-MCNC: 105 MG/DL (ref 70–99)
HCT VFR BLD AUTO: 43.5 % (ref 35–47)
HDLC SERPL-MCNC: 79 MG/DL
HGB BLD-MCNC: 13.7 G/DL (ref 11.7–15.7)
IMM GRANULOCYTES # BLD: 0 10E9/L (ref 0–0.4)
IMM GRANULOCYTES NFR BLD: 0.3 %
LDLC SERPL CALC-MCNC: 66 MG/DL
LYMPHOCYTES # BLD AUTO: 0.5 10E9/L (ref 0.8–5.3)
LYMPHOCYTES NFR BLD AUTO: 8.7 %
MCH RBC QN AUTO: 28.4 PG (ref 26.5–33)
MCHC RBC AUTO-ENTMCNC: 31.5 G/DL (ref 31.5–36.5)
MCV RBC AUTO: 90 FL (ref 78–100)
MONOCYTES # BLD AUTO: 0.7 10E9/L (ref 0–1.3)
MONOCYTES NFR BLD AUTO: 11.7 %
NEUTROPHILS # BLD AUTO: 4.5 10E9/L (ref 1.6–8.3)
NEUTROPHILS NFR BLD AUTO: 75.1 %
NONHDLC SERPL-MCNC: 93 MG/DL
NRBC # BLD AUTO: 0 10*3/UL
NRBC BLD AUTO-RTO: 0 /100
PLATELET # BLD AUTO: 228 10E9/L (ref 150–450)
POTASSIUM SERPL-SCNC: 3.4 MMOL/L (ref 3.4–5.3)
PROT SERPL-MCNC: 7.4 G/DL (ref 6.8–8.8)
RBC # BLD AUTO: 4.83 10E12/L (ref 3.8–5.2)
SODIUM SERPL-SCNC: 142 MMOL/L (ref 133–144)
TRIGL SERPL-MCNC: 134 MG/DL
WBC # BLD AUTO: 6 10E9/L (ref 4–11)

## 2020-02-14 LAB
ALBUMIN SERPL ELPH-MCNC: 3.7 G/DL (ref 3.7–5.1)
ALPHA1 GLOB SERPL ELPH-MCNC: 0.5 G/DL (ref 0.2–0.4)
ALPHA2 GLOB SERPL ELPH-MCNC: 1 G/DL (ref 0.5–0.9)
B-GLOBULIN SERPL ELPH-MCNC: 0.7 G/DL (ref 0.6–1)
GAMMA GLOB SERPL ELPH-MCNC: 1 G/DL (ref 0.7–1.6)
KAPPA LC UR-MCNC: 3.15 MG/DL (ref 0.33–1.94)
KAPPA LC/LAMBDA SER: 1.23 {RATIO} (ref 0.26–1.65)
LAMBDA LC SERPL-MCNC: 2.57 MG/DL (ref 0.57–2.63)
M PROTEIN SERPL ELPH-MCNC: 0.1 G/DL
PROT PATTERN SERPL ELPH-IMP: ABNORMAL

## 2020-02-18 NOTE — RESULT ENCOUNTER NOTE
Dear Maribel,   Your test results are all back -   Your cholesterol test looks great!  Let us know if you have any questions.  -Lisa Martinez, DO

## 2020-02-20 ENCOUNTER — TELEPHONE (OUTPATIENT)
Dept: SURGERY | Facility: CLINIC | Age: 70
End: 2020-02-20

## 2020-02-20 NOTE — TELEPHONE ENCOUNTER
Lvm x 1 for pt to call back in if she would like to come in sooner to see Vianney in colon rectal or she can keep her appt with Dr. Leo on 04/07.

## 2020-02-21 ENCOUNTER — ONCOLOGY VISIT (OUTPATIENT)
Dept: ONCOLOGY | Facility: CLINIC | Age: 70
End: 2020-02-21
Attending: INTERNAL MEDICINE
Payer: COMMERCIAL

## 2020-02-21 VITALS
OXYGEN SATURATION: 94 % | SYSTOLIC BLOOD PRESSURE: 126 MMHG | BODY MASS INDEX: 14.94 KG/M2 | HEART RATE: 77 BPM | DIASTOLIC BLOOD PRESSURE: 72 MMHG | TEMPERATURE: 97.8 F | HEIGHT: 66 IN | RESPIRATION RATE: 16 BRPM | WEIGHT: 93 LBS

## 2020-02-21 DIAGNOSIS — C21.1 MALIGNANT NEOPLASM OF ANAL CANAL (H): Primary | ICD-10-CM

## 2020-02-21 PROCEDURE — 99215 OFFICE O/P EST HI 40 MIN: CPT | Mod: ZP | Performed by: INTERNAL MEDICINE

## 2020-02-21 PROCEDURE — G0463 HOSPITAL OUTPT CLINIC VISIT: HCPCS | Mod: ZF

## 2020-02-21 ASSESSMENT — PAIN SCALES - GENERAL: PAINLEVEL: NO PAIN (0)

## 2020-02-21 ASSESSMENT — MIFFLIN-ST. JEOR: SCORE: 974.6

## 2020-02-21 NOTE — NURSING NOTE
"Oncology Rooming Note    February 21, 2020 10:19 AM   Maribel Yang is a 69 year old female who presents for:    Chief Complaint   Patient presents with     Oncology Clinic Visit     [facility] RETURN- ANAL CA     Initial Vitals: /72 (BP Location: Right arm, Patient Position: Chair, Cuff Size: Adult Small)   Pulse 77   Temp 97.8  F (36.6  C) (Oral)   Resp 16   Ht 1.678 m (5' 6.06\")   Wt 42.2 kg (93 lb)   SpO2 94%   BMI 14.98 kg/m   Estimated body mass index is 14.98 kg/m  as calculated from the following:    Height as of this encounter: 1.678 m (5' 6.06\").    Weight as of this encounter: 42.2 kg (93 lb). Body surface area is 1.4 meters squared.  No Pain (0) Comment: Data Unavailable   No LMP recorded. Patient is postmenopausal.  Allergies reviewed: Yes  Medications reviewed: Yes    Medications: Medication refills not needed today.  Pharmacy name entered into [employer]:    [facility]  [facility] - [place], [place] - [address] [place] [address] [id number] - [place], [place] - [address] [place] [address] AT [facility]    Clinical concerns: No new concerns. Dr. Rene was notified.      Norberto Koch LPN            "

## 2020-02-21 NOTE — PROGRESS NOTES
Service Date: 02/21/2020      MEDICAL ONCOLOGY NEW PATIENT VISIT      REFERRING PHYSICIAN:  Meggan Tucker MD, [facility] On[facility]      GASTROINTESTINAL CANCER DIAGNOSIS:  Localized anal carcinoma, stage T1 without pelvic or inguinal lymphadenopathy.  She completed concurrent chemoradiation with Xeloda 06/11/2018.  She comes here for routine surveillance and followup.      HISTORY OF PRESENT ILLNESS:  Ms. Maribel Yang is a 69-year-old  woman from [place].  She has an extensive list of comorbidities in her past medical history.  It is most notable for a kidney transplant in 2004.  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 Mckeon.  She has a history of HPV infection, extensive CAD and peripheral arterial disease and other cardiovascular comorbidities as well.  She has cervical dysplasia, for which she has seen Dr. Renteria and Dr. Garcia from Gynecologic Oncology in the past, and that includes anorectal condylomata and vulvar intraepithelial neoplasia, grade 2.      She underwent an exam due to history of HPV in 2018 and was found to have a superficially invasive anal cancer on 03/14/2018.  This examination was performed under anesthesia by Dr. Shabbir Leo of Colorectal Surgery Service.  The tumor was a poorly differentiated invasive form of squamous cell carcinoma.  It invaded into the anal sphincter muscle.  It was graded as a pathologic T1.  MRI pelvis on 02/28/2018 for staging showed no evidence of pelvic or inguinal lymphadenopathy.      The patient's case was discussed at the multidisciplinary tumor board.  The recommendation was to proceed with concurrent chemoradiation.  Capecitabine was given, but without mitomycin due to comorbidities and a history of renal transplant while on immunosuppression.  The patient underwent concurrent chemoradiation with intent to cure from 04/30/2018-06/11/2018, and she was subsequently  followed with Dr. Tucker.  The patient in the interval has followed up with Dr. Leo for endoscopies.  The most recent evaluation was on 10/01/2019.  She was due this month, but did not schedule that appointment.  She had a previous CT scan on 02/15/2019 that showed no evidence of metastatic disease.      She followed with Dr. Tucker in the summer, and Dr. Tucker has left the institution, and the patient's case was referred to me.  The patient had a 02/13/2020 CT scan of the chest, abdomen and pelvis that I reviewed prior to this visit showing no convincing evidence of metastatic disease or recurrent form of anal carcinoma or her lung carcinoma, either.  Again, the last endoscopy was 10/01, and she is due.  I reached out to Dr. Leo's team earlier this week, and they have been trying to reach her by voice mails, but the patient has not yet responded.  She states today she is aware of the voice mails, but is looking to respond later today or over the weekend.  Her last full colonoscopy was 08/09/2017.  Her last Pap smear was performed by Dr. Pati Garcia of Gynecologic Oncology 09/10/2019.  She denies any pain or any other issues at this time.      PAST MEDICAL HISTORY:  Anal squamous cell carcinoma as noted above, pathologic T1N0.  She has ASCUS with positive high-risk HPV.  She has a history of lung carcinoma of the right upper lobe.  This was confirmed as squamous cell carcinoma diagnosis in 2014, status post SBRT without surgery or indication for chemotherapy or radiation.  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      PAST SURGICAL HISTORY:  Notable for some of the surgeries noted above, including transplantation of the kidney in 2004 at the [facility], other various LEEP procedures and other gynecologic procedures, including laser procedures to the vaginal area for HPV  "infection.  Also of note, she has had anoscopies dating back to nearly a decade.      FAMILY HISTORY:  Not notable for any form of cancer and none discussed today.      SOCIAL HISTORY:  She lives in [place].  She is a former smoker; she quit approximately 5 years ago.  She smoked 1/3 of a pack a day for 35 years per the medical record.  She denies drinking alcohol.  She has 4 children.  She does not work outside the home currently.      MEDICATIONS:  Fully reviewed in Epic.   Current Outpatient Medications   Medication     ACETAMINOPHEN PO     atorvastatin (LIPITOR) 20 MG tablet     calcium carbonate 600 mg-vitamin D 400 units (CALTRATE) 600-400 MG-UNIT per tablet     cilostazol (PLETAL) 100 MG tablet     clopidogrel (PLAVIX) 75 MG tablet     Lactobacillus-Inulin ([facility] DIGESTIVE [facility]) CAPS     losartan (COZAAR) 25 MG tablet     metoprolol tartrate (LOPRESSOR) 100 MG tablet     NIFEdipine ER OSMOTIC (PROCARDIA XL) 90 MG 24 hr tablet     predniSONE (DELTASONE) 5 MG tablet     sirolimus (GENERIC EQUIVALENT) 1 MG tablet     acetaminophen-codeine (TYLENOL WITH CODEINE #3) 300-30 MG per tablet     amoxicillin (AMOXIL) 500 MG capsule     calcium citrate (CITRACAL) 950 MG tablet     sirolimus (GENERIC EQUIVALENT) 1 MG tablet     No current facility-administered medications for this visit.             ALLERGIES:  Fully reviewed in Epic.      Allergies   Allergen Reactions     Ultracet Nausea and Vomiting and Hives     Fentanyl Nausea     Has had since she initially had the issues with nausea and tolerated it OK.      Hydrocodone Nausea and Vomiting and Hives        REVIEW OF SYSTEMS:   Full 14 point review of systems was performed.  Pertinent symptoms are reviewed above per HPI.       PHYSICAL EXAMINATION:     /72 (BP Location: Right arm, Patient Position: Chair, Cuff Size: Adult Small)   Pulse 77   Temp 97.8  F (36.6  C) (Oral)   Resp 16   Ht 1.678 m (5' 6.06\")   Wt 42.2 kg (93 lb)   SpO2 94%   BMI " 14.98 kg/m      KPS:  80%   GENERAL:  A very pleasant elderly  woman who is generally well appearing, but appears to have chronic illnesses.     HEENT: PERRLA.  Oropharynx clear with no mucositis or thrush.   LYMPH NODES:  No palpable pre-/postauricular, cervical, axillary or inguinal lymphadenopathy appreciated.   CARDIOVASCULAR:  RRR, normal S1 and S2.  No murmurs, gallops, or rubs.   LUNGS:  Clear to auscultation bilaterally.  No dullness to percussion.   GI/ABDOMEN:  Soft, nontender and nondistended.  Bowel sounds heard x4.  No apparent hepatosplenomegaly.   EXTREMITIES:  No clubbing, cyanosis, or edema.         LABORATORY AND RADIOLOGY:  Those pertinent were reviewed by me.  I printed out a copy of the CT scan of the report for surveillance previously ordered by Dr. Tucker.  I reviewed it in great detail with the patient and gave her a printed copy by the end of the visit.      IMPRESSION/PLAN:  Ms. Maribel Yang is a 69-year-old woman with numerous comorbidities, status post kidney transplant in 2004 for Alport disease.  She had a prior history of malignancy of the lung in the right upper lobe in the form of squamous cell carcinoma treated with SBRT by Dr. Mckeon in 2014.  It was clinical [id number].  There was no evidence of recurrent disease of that on the recent CT scan as well as no evidence of recurrent HPV-induced squamous cell carcinoma of the anus.  She is overdue for a local anoscopy, and I encouraged her to follow up with Dr. Leo on the phone call to ensure that it is done now and then with subsequent anoscopies as scheduled with Dr. Leo in the future.      In terms of radiologic scanning per NCCN guidelines, in the absence of any inguinal lymph node or pelvic lymph node involvement at the time of diagnosis or since, as well as with the early stages of tumor not being a T3-T4 version at the time of diagnosis, there is no indication for continued radiologic surveillance.  The patient was  relieved to hear this.  She will focus on her other comorbid conditions with her primary care physician and follow up on anal cancer surveillance with Dr. Leo as indicated.  I am happy to be involved in any extent needed moving forward.  We will also have our DAYANA team and Oncology follow up with the patient in approximately 6 months for a routine evaluation.      Thank you very much for the kind referral.  The patient stated understanding of the above and had no further questions.     I spent approximately 30 minutes with the patient, including the history and physical, and 25 minutes in discussion.  I spent 20 minutes prior to the visit reviewing the patient's medical records, images, pathology reports, outside records and values.      cc:   Shabbir Leo MD   [facility] Colorectal Surgery    [address] [place] [id number]         FILIPPO WILKINS MD             D: 2020   T: 2020   MT: jose      Name:     ERASMO MERINO   MRN:      [id number]        Account:      [id number]   :      1950           Service Date: 2020      Document: [id number]

## 2020-02-21 NOTE — LETTER
RE: Maribel Yang  [address]  [facility] [address]     Dear Colleague,    Thank you for referring your patient, Maribel Yang, to the [facility]. Please see a copy of my visit note below.    Service Date: 02/21/2020      MEDICAL ONCOLOGY NEW PATIENT VISIT      REFERRING PHYSICIAN:  Meggan Tucker MD, [facility] [[email or web address]] Phys[facility] Oncology      GASTROINTESTINAL CANCER DIAGNOSIS:  Localized anal carcinoma, stage T1 without pelvic or inguinal lymphadenopathy.  She completed concurrent chemoradiation with Xeloda 06/11/2018.  She comes here for routine surveillance and followup.      HISTORY OF PRESENT ILLNESS:  Ms. Maribel Yang is a 69-year-old  woman from [place].  She has an extensive list of comorbidities in her past medical history.  It is most notable for a kidney transplant in 2004.  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 Mckeon.  She has a history of HPV infection, extensive CAD and peripheral arterial disease and other cardiovascular comorbidities as well.  She has cervical dysplasia, for which she has seen Dr. Renteria and Dr. Garcia from Gynecologic Oncology in the past, and that includes anorectal condylomata and vulvar intraepithelial neoplasia, grade 2.      She underwent an exam due to history of HPV in 2018 and was found to have a superficially invasive anal cancer on 03/14/2018.  This examination was performed under anesthesia by Dr. Shabbir Leo of Colorectal Surgery Service.  The tumor was a poorly differentiated invasive form of squamous cell carcinoma.  It invaded into the anal sphincter muscle.  It was graded as a pathologic T1.  MRI pelvis on 02/28/2018 for staging showed no evidence of pelvic or inguinal lymphadenopathy.      The patient's case was discussed at the multidisciplinary tumor board.  The recommendation was to proceed with concurrent chemoradiation.   Capecitabine was given, but without mitomycin due to comorbidities and a history of renal transplant while on immunosuppression.  The patient underwent concurrent chemoradiation with intent to cure from 04/30/2018-06/11/2018, and she was subsequently followed with Dr. Tucker.  The patient in the interval has followed up with Dr. Leo for endoscopies.  The most recent evaluation was on 10/01/2019.  She was due this month, but did not schedule that appointment.  She had a previous CT scan on 02/15/2019 that showed no evidence of metastatic disease.      She followed with Dr. Tucker in the summer, and Dr. Tucker has left the institution, and the patient's case was referred to me.  The patient had a 02/13/2020 CT scan of the chest, abdomen and pelvis that I reviewed prior to this visit showing no convincing evidence of metastatic disease or recurrent form of anal carcinoma or her lung carcinoma, either.  Again, the last endoscopy was 10/01, and she is due.  I reached out to Dr. Leo's team earlier this week, and they have been trying to reach her by voice mails, but the patient has not yet responded.  She states today she is aware of the voice mails, but is looking to respond later today or over the weekend.  Her last full colonoscopy was 08/09/2017.  Her last Pap smear was performed by Dr. Pati Garcia of Gynecologic Oncology 09/10/2019.  She denies any pain or any other issues at this time.      PAST MEDICAL HISTORY:  Anal squamous cell carcinoma as noted above, pathologic T1N0.  She has ASCUS with positive high-risk HPV.  She has a history of lung carcinoma of the right upper lobe.  This was confirmed as squamous cell carcinoma diagnosis in 2014, status post SBRT without surgery or indication for chemotherapy or radiation.  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      PAST SURGICAL  HISTORY:  Notable for some of the surgeries noted above, including transplantation of the kidney in 2004 at the [facility], other various LEEP procedures and other gynecologic procedures, including laser procedures to the vaginal area for HPV infection.  Also of note, she has had anoscopies dating back to nearly a decade.      FAMILY HISTORY:  Not notable for any form of cancer and none discussed today.      SOCIAL HISTORY:  She lives in [place].  She is a former smoker; she quit approximately 5 years ago.  She smoked 1/3 of a pack a day for 35 years per the medical record.  She denies drinking alcohol.  She has 4 children.  She does not work outside the home currently.      MEDICATIONS:  Fully reviewed in Epic.   Current Outpatient Medications   Medication     ACETAMINOPHEN PO     atorvastatin (LIPITOR) 20 MG tablet     calcium carbonate 600 mg-vitamin D 400 units (CALTRATE) 600-400 MG-UNIT per tablet     cilostazol (PLETAL) 100 MG tablet     clopidogrel (PLAVIX) 75 MG tablet     Lactobacillus-Inulin ([facility] DIGESTIVE [facility]) CAPS     losartan (COZAAR) 25 MG tablet     metoprolol tartrate (LOPRESSOR) 100 MG tablet     NIFEdipine ER OSMOTIC (PROCARDIA XL) 90 MG 24 hr tablet     predniSONE (DELTASONE) 5 MG tablet     sirolimus (GENERIC EQUIVALENT) 1 MG tablet     acetaminophen-codeine (TYLENOL WITH CODEINE #3) 300-30 MG per tablet     amoxicillin (AMOXIL) 500 MG capsule     calcium citrate (CITRACAL) 950 MG tablet     sirolimus (GENERIC EQUIVALENT) 1 MG tablet     No current facility-administered medications for this visit.             ALLERGIES:  Fully reviewed in Epic.      Allergies   Allergen Reactions     Ultracet Nausea and Vomiting and Hives     Fentanyl Nausea     Has had since she initially had the issues with nausea and tolerated it OK.      Hydrocodone Nausea and Vomiting and Hives        REVIEW OF SYSTEMS:   Full 14 point review of systems was performed.  Pertinent symptoms are  "reviewed above per HPI.       PHYSICAL EXAMINATION:     /72 (BP Location: Right arm, Patient Position: Chair, Cuff Size: Adult Small)   Pulse 77   Temp 97.8  F (36.6  C) (Oral)   Resp 16   Ht 1.678 m (5' 6.06\")   Wt 42.2 kg (93 lb)   SpO2 94%   BMI 14.98 kg/m       KPS:  80%   GENERAL:  A very pleasant elderly  woman who is generally well appearing, but appears to have chronic illnesses.     HEENT: PERRLA.  Oropharynx clear with no mucositis or thrush.   LYMPH NODES:  No palpable pre-/postauricular, cervical, axillary or inguinal lymphadenopathy appreciated.   CARDIOVASCULAR:  RRR, normal S1 and S2.  No murmurs, gallops, or rubs.   LUNGS:  Clear to auscultation bilaterally.  No dullness to percussion.   GI/ABDOMEN:  Soft, nontender and nondistended.  Bowel sounds heard x4.  No apparent hepatosplenomegaly.   EXTREMITIES:  No clubbing, cyanosis, or edema.     LABORATORY AND RADIOLOGY:  Those pertinent were reviewed by me.  I printed out a copy of the CT scan of the report for surveillance previously ordered by Dr. Tucker.  I reviewed it in great detail with the patient and gave her a printed copy by the end of the visit.      IMPRESSION/PLAN:  Ms. Maribel Yang is a 69-year-old woman with numerous comorbidities, status post kidney transplant in 2004 for Alport disease.  She had a prior history of malignancy of the lung in the right upper lobe in the form of squamous cell carcinoma treated with SBRT by Dr. Mckeon in 2014.  It was clinical [id number].  There was no evidence of recurrent disease of that on the recent CT scan as well as no evidence of recurrent HPV-induced squamous cell carcinoma of the anus.  She is overdue for a local anoscopy, and I encouraged her to follow up with Dr. Leo on the phone call to ensure that it is done now and then with subsequent anoscopies as scheduled with Dr. Leo in the future.      In terms of radiologic scanning per NCCN guidelines, in the absence of any " inguinal lymph node or pelvic lymph node involvement at the time of diagnosis or since, as well as with the early stages of tumor not being a T3-T4 version at the time of diagnosis, there is no indication for continued radiologic surveillance.  The patient was relieved to hear this.  She will focus on her other comorbid conditions with her primary care physician and follow up on anal cancer surveillance with Dr. Leo as indicated.  I am happy to be involved in any extent needed moving forward.  We will also have our DAYANA team and Oncology follow up with the patient in approximately 6 months for a routine evaluation.      Thank you very much for the kind referral.  The patient stated understanding of the above and had no further questions.     I spent approximately 30 minutes with the patient, including the history and physical, and 25 minutes in discussion.  I spent 20 minutes prior to the visit reviewing the patient's medical records, images, pathology reports, outside records and values.      FILIPPO WILKINS MD     cc:   Shabbir Leo MD   [facility] Colorectal Surgery    [address] [place] [id number]      D: 2020   T: 2020   MT: jose      Name:     ERASMO MERINO   MRN:      [id number]        Account:      [id number]   :      1950           Service Date: 2020      Document: [id number]

## 2020-02-27 NOTE — TELEPHONE ENCOUNTER
Patient confirms she quit smoking and it's been so long she doesn't exactly remember when her last cigarette was. No need for program.     Tobacco Treatment Program at the [facility] attempted to reach Ms. Yang on 2/27/2020 regarding the tobacco cessation program to help Ms. Yang to quit smoking.    CHAPITO MataS  Tobacco Treatment Specialist  PH: [phone]

## 2020-03-07 ENCOUNTER — NURSE TRIAGE (OUTPATIENT)
Dept: NURSING | Facility: CLINIC | Age: 70
End: 2020-03-07

## 2020-03-07 ENCOUNTER — HOSPITAL ENCOUNTER (EMERGENCY)
Facility: CLINIC | Age: 70
Discharge: HOME | End: 2020-03-07
Attending: EMERGENCY MEDICINE | Admitting: EMERGENCY MEDICINE
Payer: COMMERCIAL

## 2020-03-07 ENCOUNTER — APPOINTMENT (OUTPATIENT)
Dept: ULTRASOUND IMAGING | Facility: CLINIC | Age: 70
End: 2020-03-07
Attending: EMERGENCY MEDICINE
Payer: COMMERCIAL

## 2020-03-07 VITALS
HEART RATE: 83 BPM | BODY MASS INDEX: 16.27 KG/M2 | SYSTOLIC BLOOD PRESSURE: 131 MMHG | OXYGEN SATURATION: 95 % | RESPIRATION RATE: 16 BRPM | DIASTOLIC BLOOD PRESSURE: 77 MMHG | TEMPERATURE: 98 F | WEIGHT: 101 LBS

## 2020-03-07 DIAGNOSIS — I82.431 ACUTE DEEP VEIN THROMBOSIS (DVT) OF POPLITEAL VEIN OF RIGHT LOWER EXTREMITY (H): ICD-10-CM

## 2020-03-07 DIAGNOSIS — I82.411 ACUTE DEEP VEIN THROMBOSIS (DVT) OF FEMORAL VEIN OF RIGHT LOWER EXTREMITY (H): ICD-10-CM

## 2020-03-07 LAB
CREAT BLD-MCNC: 1.4 MG/DL (ref 0.52–1.04)
GFR SERPL CREATININE-BSD FRML MDRD: 38 ML/MIN/{1.73_M2}
RADIOLOGIST FLAGS: ABNORMAL

## 2020-03-07 PROCEDURE — 93978 VASCULAR STUDY: CPT

## 2020-03-07 PROCEDURE — 82565 ASSAY OF CREATININE: CPT

## 2020-03-07 PROCEDURE — 96372 THER/PROPH/DIAG INJ SC/IM: CPT | Performed by: EMERGENCY MEDICINE

## 2020-03-07 PROCEDURE — 93971 EXTREMITY STUDY: CPT | Mod: RT

## 2020-03-07 PROCEDURE — 99284 EMERGENCY DEPT VISIT MOD MDM: CPT | Mod: 25 | Performed by: EMERGENCY MEDICINE

## 2020-03-07 PROCEDURE — 99284 EMERGENCY DEPT VISIT MOD MDM: CPT | Mod: Z6 | Performed by: EMERGENCY MEDICINE

## 2020-03-07 PROCEDURE — 25000128 H RX IP 250 OP 636: Performed by: EMERGENCY MEDICINE

## 2020-03-07 RX ADMIN — ENOXAPARIN SODIUM 50 MG: 60 INJECTION SUBCUTANEOUS at 20:33

## 2020-03-07 ASSESSMENT — ENCOUNTER SYMPTOMS
NECK STIFFNESS: 0
LIGHT-HEADEDNESS: 0
MYALGIAS: 0
CHILLS: 0
DIFFICULTY URINATING: 0
HEADACHES: 0
CONFUSION: 0
FEVER: 0
ARTHRALGIAS: 0
JOINT SWELLING: 0
VOMITING: 0
SHORTNESS OF BREATH: 0
NAUSEA: 0
ADENOPATHY: 0
NUMBNESS: 0
WEAKNESS: 0
ABDOMINAL PAIN: 0
BRUISES/BLEEDS EASILY: 0
POLYDIPSIA: 0
COUGH: 0
NECK PAIN: 0
COLOR CHANGE: 0
EYE REDNESS: 0

## 2020-03-07 NOTE — ED AVS SNAPSHOT
[facility], [place], Emergency Department  [address] [place] [address]  Phone:  [phone]                                    Maribel Yang   MRN: [id number]    Department:  [facility], Emergency Department   Date of Visit:  3/7/2020           After Visit Summary Signature Page    I have received my discharge instructions, and my questions have been answered. I have discussed any challenges I see with this plan with the nurse or doctor.    ..........................................................................................................................................  Patient/Patient Representative Signature      ..........................................................................................................................................  Patient Representative Print Name and Relationship to Patient    ..................................................               ................................................  Date                                   Time    ..........................................................................................................................................  Reviewed by Signature/Title    ...................................................              ..............................................  Date                                               Time          22EPIC Rev 08/18

## 2020-03-07 NOTE — ED PROVIDER NOTES
[place] EMERGENCY DEPARTMENT ([facility])  3/07/20  History     Chief Complaint   Patient presents with     Leg Swelling     RLE swelling     The history is provided by the patient and medical records.     Maribel Yang is a 69 year old female with a past medical history of anal squamous cell carcinoma (stage I without pelvic or inguinal lymphadenopathy), pathologic T1N0, ASCUS with positive high-risk HPV, lung carcinoma of the right upper lobe (squamous cell, 2014) s/p SBRT without surgery or indication for chemotherapy or radiation, hypertension, CAD, PAD, DVTs, and Alport disease and kidney dysfunction requiring kidney transplant (2004) who presents to the Emergency Department for evaluation of right leg swelling.  Per chart review, patient was seen at [facility] on 2/21/2020 for localized anal carcinoma.  Patient was encouraged to undergo anoscopy, with subsequent anoscopies in the future.  Due to the absence of any inguinal lymph node or pelvic lymph node involvement at the time of diagnosis presents, there was no indication for continued radiologic surveillance.  Patient was discharged with plan to follow-up with oncology in 6 months for routine evaluation.    Patient's ED stay with increased right leg swelling.  Patient states that this initially started 2 days ago, but worsened today.  Patient does endorse mild, achy, nonradiating, constant pain in the right leg, mostly in the inner aspect of right thigh.  Patient states he does have a history of DVTs back in adolescence.  Patient is currently on Plavix 75 mg.  Patient denies any shortness of breath, or chest pain.     Past Medical History:   Diagnosis Date     Abnormal coagulation profile     p [id number]>A heterozygote      Age-related osteoporosis without current pathological fracture 6/22/2019     Anemia      Antiplatelet or antithrombotic long-term use      ASCUS with positive high risk HPV 2007, 2015    + HPV 56,  54,& 6, colp - TAL III, Leep =TAL II     Basal cell carcinoma      Hypertension      Immunosuppressed status (H)     due meds     Kidney replaced by transplant 9/04    Living donor recipient,  Rejection 7/2005     LSIL (low grade squamous intraepithelial lesion) on Pap smear 4/2013    +HPV 33 or 45, 61       PAD (peripheral artery disease) (H)      PONV (postoperative nausea and vomiting)      Squamous cell lung cancer (H)      Thrombosis of leg 1967     Unspecified disorder of kidney and ureter     X-linked dominant Alport's syndrome.       Past Surgical History:   Procedure Laterality Date     BIOPSY ANAL N/A 3/14/2018    Procedure: BIOPSY ANAL;;  Surgeon: Shabbir Leo MD;  Location: [facility] OR     C NONSPECIFIC PROCEDURE      Thrombectomy     C NONSPECIFIC PROCEDURE  1955 and 1959    Bilater eye surgery - correction for crossed eyes     C NONSPECIFIC PROCEDURE  1998    oopherectomy L     C NONSPECIFIC PROCEDURE  1967    open kidney biopsy - L     C TRANSPLANTATION OF KIDNEY  9/04    recipient -- done at [facility]     COLONOSCOPY       COLONOSCOPY N/A 8/9/2017    Procedure: COMBINED COLONOSCOPY, SINGLE OR MULTIPLE BIOPSY/POLYPECTOMY BY BIOPSY;;  Surgeon: Sushil Hyatt MD;  Location:  GI     COLPOSCOPY,LOOP ELECTRD CERVIX EXCIS  03/11/08    TAL II     CONIZATION LEEP  7/17/2013    Procedure: CONIZATION LEEP;;  Surgeon: Liliana Renteria MD;  [facility]     CONIZATION LEEP N/A 8/17/2016    Procedure: CONIZATION LEEP;  Surgeon: Liliana Renteria MD;  Location: [facility] OR     EXAM UNDER ANESTHESIA ANUS  7/15/2014    Procedure: EXAM UNDER ANESTHESIA ANUS;  Surgeon: Radha Musa MD;  [facility]     EXAM UNDER ANESTHESIA ANUS N/A 3/14/2018    Procedure: EXAM UNDER ANESTHESIA ANUS;  Anal Exam Under Anesthesia With Excision of anal lesion, proctoscopy;  Surgeon: Shabbir Leo MD;  [facility]     EYE SURGERY       LASER CO2 EXCISE VULVA WIDE LOCAL  7/15/2014    Procedure: LASER CO2  EXCISE VULVA WIDE LOCAL;  Surgeon: Liliana Renteria MD;  [facility]: [facility] OR     LASER CO2 VAGINA  2013    Procedure: LASER CO2 VAGINA;;  Surgeon: Liliana Renteria MD;  [facility]: [facility] OR     LASER CO2 VAGINA N/A 2018    Procedure: LASER CO2 VAGINA;  Exam Under Anesthesia, CO2 Laser Ablation of Upper Vagina and Cervix;  Surgeon: Pati Garcia MD;  Location: [facility] OR     MICROSCOPY ANAL  2013    Procedure: MICROSCOPY ANAL;  Anal Microscopy,  EUA vagina,Colposcopy Of Vagina And Vulva, Vaginal Biopsies, Omniguide Co2 Laser To Vagina and vulva, Loop Electrosurgical Excision Procedure To Cervix;  Surgeon: Radha Musa MD;  Location: [facility] OR     MICROSCOPY ANAL  7/15/2014    Procedure: MICROSCOPY ANAL;  Surgeon: Radha Musa MD;  Location: [facility] OR       Family History   Problem Relation Age of Onset     Diabetes Father         type 2 diag age,60's     Alcohol/Drug Father      Arthritis Father      Hypertension Father      Lipids Father         high cholesterol     Arthritis Mother      Diabetes Mother      Depression Mother      Heart Disease Mother      Neurologic Disorder Mother      Obesity Mother      Psychotic Disorder Mother      Thyroid Disease Mother      Gynecology Sister         Precancerous cell removal from cervix at age 45     Depression Sister      Allergies Sister      Alcohol/Drug Sister      Neurologic Disorder Sister      Cerebrovascular Disease Paternal Grandmother          of a stroke in her 80's     Diabetes Paternal Grandmother      Alcohol/Drug Son      Colon Polyps Sister      Colon Cancer No family hx of      Crohn's Disease No family hx of      Ulcerative Colitis No family hx of      Melanoma No family hx of      Skin Cancer No family hx of        Social History     Tobacco Use     Smoking status: Former Smoker     Packs/day: 0.30     Years: 35.00     Pack years: 10.50     Types: Cigarettes     Start date: 1967     Smokeless tobacco: Never Used    Substance Use Topics     Alcohol use: Yes     Alcohol/week: 0.0 standard drinks     Frequency: Monthly or less     Drinks per session: 1 or 2     Binge frequency: Less than monthly     Comment: rarely       No current facility-administered medications for this encounter.      Current Outpatient Medications   Medication     [START ON 3/8/2020] enoxaparin ANTICOAGULANT (LOVENOX) 60 MG/0.6ML syringe     ACETAMINOPHEN PO     acetaminophen-codeine (TYLENOL WITH CODEINE #3) 300-30 MG per tablet     amoxicillin (AMOXIL) 500 MG capsule     atorvastatin (LIPITOR) 20 MG tablet     calcium carbonate 600 mg-vitamin D 400 units (CALTRATE) 600-400 MG-UNIT per tablet     calcium citrate (CITRACAL) 950 MG tablet     cilostazol (PLETAL) 100 MG tablet     clopidogrel (PLAVIX) 75 MG tablet     Lactobacillus-Inulin ([facility] DIGESTIVE [facility]) CAPS     losartan (COZAAR) 25 MG tablet     metoprolol tartrate (LOPRESSOR) 100 MG tablet     NIFEdipine ER OSMOTIC (PROCARDIA XL) 90 MG 24 hr tablet     predniSONE (DELTASONE) 5 MG tablet     sirolimus (GENERIC EQUIVALENT) 1 MG tablet     sirolimus (GENERIC EQUIVALENT) 1 MG tablet        Allergies   Allergen Reactions     Ultracet Nausea and Vomiting and Hives     Fentanyl Nausea     Has had since she initially had the issues with nausea and tolerated it OK.      Hydrocodone Nausea and Vomiting and Hives     I have reviewed the Medications, Allergies, Past Medical and Surgical History, and Social History in the Epic system.    Review of Systems   Constitutional: Negative for chills and fever.   HENT: Negative for congestion.    Eyes: Negative for redness.   Respiratory: Negative for cough and shortness of breath.    Cardiovascular: Positive for leg swelling ( left). Negative for chest pain.   Gastrointestinal: Negative for abdominal pain, nausea and vomiting.   Endocrine: Negative for polydipsia and polyuria.   Genitourinary: Negative for difficulty urinating.   Musculoskeletal: Negative for  arthralgias, gait problem, joint swelling, myalgias, neck pain and neck stiffness.   Skin: Negative for color change.   Allergic/Immunologic: Negative for immunocompromised state.   Neurological: Negative for weakness, light-headedness, numbness and headaches.   Hematological: Negative for adenopathy. Does not bruise/bleed easily.   Psychiatric/Behavioral: Negative for confusion.   All other systems reviewed and are negative.      Physical Exam   BP: 127/80  Pulse: 83  Temp: 97.6  F (36.4  C)  Resp: 16  Weight: 45.8 kg (101 lb)  SpO2: 95 %      Physical Exam  Vitals signs and nursing note reviewed.   Constitutional:       General: She is not in acute distress.     Appearance: She is normal weight. She is not diaphoretic.   HENT:      Head: Normocephalic and atraumatic.      Nose: Nose normal.      Mouth/Throat:      Mouth: Mucous membranes are moist.      Pharynx: Oropharynx is clear. No oropharyngeal exudate.   Eyes:      General: No scleral icterus.     Extraocular Movements: Extraocular movements intact.      Conjunctiva/sclera: Conjunctivae normal.   Neck:      Musculoskeletal: Normal range of motion.   Cardiovascular:      Rate and Rhythm: Normal rate.      Pulses: Normal pulses.           Dorsalis pedis pulses are 2+ on the right side and 2+ on the left side.        Posterior tibial pulses are 2+ on the right side and 2+ on the left side.      Heart sounds: Normal heart sounds.   Pulmonary:      Effort: Pulmonary effort is normal. No respiratory distress.      Breath sounds: Normal breath sounds.   Abdominal:      Palpations: Abdomen is soft.      Tenderness: There is no abdominal tenderness.   Musculoskeletal: Normal range of motion.         General: No swelling, tenderness, deformity or signs of injury.      Right lower leg: Edema present.      Left lower leg: No edema.      Comments: Nonpitting edema in right leg without tenderness to palpation of right leg and left thigh.  No discoloration of right lower  extremity.  Compartments of right lower extremity are soft.  No pain with active or passive range of motion in right hip, right knee, and right ankle.   Skin:     General: Skin is warm.      Capillary Refill: Capillary refill takes less than 2 seconds.      Coloration: Skin is not pale.      Findings: No erythema or rash.      Comments: No erythema, induration, or mottling in right lower extremity.   Neurological:      General: No focal deficit present.      Mental Status: She is alert and oriented to person, place, and time.      GCS: GCS eye subscore is 4. GCS verbal subscore is 5. GCS motor subscore is 6.      Cranial Nerves: Cranial nerves are intact.      Sensory: Sensation is intact. No sensory deficit.      Motor: Motor function is intact. No weakness.      Coordination: Coordination is intact. Coordination normal.      Gait: Gait is intact.   Psychiatric:         Mood and Affect: Mood normal.         Behavior: Behavior normal.         Thought Content: Thought content normal.         Judgment: Judgment normal.         ED Course   6:13 PM  The patient was seen and examined by Efren Gonzales MD in Room ED23.    Medications   enoxaparin ANTICOAGULANT (LOVENOX) injection 50 mg (50 mg Subcutaneous Given 3/7/20 2033)           Procedures                           Labs Ordered and Resulted from Time of ED Arrival Up to the Time of Departure from the ED   CREATININE POCT - Abnormal; Notable for the following components:       Result Value    Creatinine 1.4 (*)     GFR Estimate 38 (*)     GFR Estimate If Black 45 (*)     All other components within normal limits   ISTAT CREATININE NURSING POCT     Results for orders placed or performed during the hospital encounter of 03/07/20 (from the past 24 hour(s))   US Lower Extremity Venous Duplex Right   Result Value Ref Range    Radiologist flags Deep vein thrombosis (Urgent)     Narrative    EXAMINATION: DOPPLER VENOUS ULTRASOUND OF THE RIGHT LOWER EXTREMITY,  3/7/2020 7:14  PM     COMPARISON: None.    HISTORY: Concern for DVT    TECHNIQUE:  Gray-scale evaluation with compression, spectral flow, and  color Doppler assessment of the deep venous system of the right leg  from groin to knee, and then at the ankle.    FINDINGS:  In the right lower extremity there is occlusive thrombus in the right  common femoral, femoral and popliteal veins. The posterior tibial  veins and the ankle are fully compressible. Occlusive thrombus is seen  in the greater saphenous vein in the upper thigh. Extensive  subcutaneous edema.    The left common femoral vein demonstrates compressibility, color  Doppler flow and normal waveforms.      Impression    IMPRESSION:  1.  Occlusive thrombus in the right common femoral, femoral and  popliteal veins.    [Urgent Result: Deep vein thrombosis]  r  Finding was identified on 3/7/2020 7:27 PM.     Dr Gonzales was contacted by Dr. Arellano at 3/7/2020 7:35 PM and  verbalized understanding of the urgent finding.     I have personally reviewed the examination and initial interpretation  and I agree with the findings.    MAGALIE MARS MD   Creatinine POCT   Result Value Ref Range    Creatinine 1.4 (H) 0.52 - 1.04 mg/dL    GFR Estimate 38 (L) >60 mL/min/[1.73_m2]    GFR Estimate If Black 45 (L) >60 mL/min/[1.73_m2]     *Note: Due to a large number of results and/or encounters for the requested time period, some results have not been displayed. A complete set of results can be found in Results Review.          Assessments & Plan (with Medical Decision Making)   69-year-old woman presenting with right lower extremity swelling for 2 days.  Differential diagnosis: DVT, valvular insufficiency, unlikely of thromboembolic process, phlegmasia.    After thorough recent physical exam patient reports to be no acute distress.  I will obtain ultrasound of right lower extremity for further diagnostic evaluation.  Her leg is warm and well-perfused with symmetrical pulses in comparison to  left lower extremity.        This part of the medical record was transcribed by Presley Dubois, Medical Scribe, from a dictation done by Efren Gonzales MD.     I reviewed patient's right lower extremity ultrasound and I read the radiology report; she has DVT in her right, common femoral and popliteal veins.  Creatinine is slightly elevated 1.4 and GFR is 38.  ED pharmacist was consulted given patient's renal transplant and somewhat abnormal renal function and the recommendations were made to treat her with slightly elevated dose of subcutaneous Lovenox, however, only once daily instead of every 12 hours.  She did receive a dose of subcutaneous Lovenox in the emergency department.  She will be discharged with recommendations for outpatient primary care and hematology follow-up for reevaluation and additional prescription for anticoagulant medications.  She agrees with this plan and agrees to come back to the emergency department if her symptoms worsen.  At this time she is stable for discharge.    I have reviewed the nursing notes.    I have reviewed the findings, diagnosis, plan and need for follow up with the patient.    Discharge Medication List as of 3/7/2020  8:00 PM      START taking these medications    Details   enoxaparin ANTICOAGULANT (LOVENOX) 60 MG/0.6ML syringe Inject 0.5 mLs (50 mg) Subcutaneous daily for 13 days, Disp-7.8 mL, R-0, Local Print             Final diagnoses:   Acute deep vein thrombosis (DVT) of femoral vein of right lower extremity (H)   Acute deep vein thrombosis (DVT) of popliteal vein of right lower extremity (H)   IPresley, am serving as a trained medical scribe to document services personally performed by Efren Gonzales MD, based on the provider's statements to me.      Efren VARGHESE MD, was physically present and have reviewed and verified the accuracy of this note documented by Presley Dubois.     3/7/2020   [facility], [place], EMERGENCY DEPARTMENT     Janet  MD Efren  03/07/20 [id number]

## 2020-03-07 NOTE — ED TRIAGE NOTES
"Pt presents ambulatory to triage with c/o right lower extremity swelling. States it has been going on for a few days. Denies severe pain, more painful where \"veins are popping out.\" history of blood clot over 20 years ago.   " Monthly or less

## 2020-03-07 NOTE — TELEPHONE ENCOUNTER
"Maribel reports that her entire right leg and foot are swollen almost twice the size of the other leg.    The bottom of her right foot is purple.  Veins are bulging in her right foot and upper thigh.    She's noted some swelling the past couple of days but today it has become significantly worse.    The leg is \"achy\". She is able to walk. She has sensation in her foot and toes.    Per protocol, advised to be seen within 4 hours.  ER suggested.      Brina Lemus RN  [facility]        Reason for Disposition    SEVERE leg swelling (e.g., swelling extends above knee, entire leg is swollen, weeping fluid)    Additional Information    Negative: Severe difficulty breathing (e.g., struggling for each breath, speaks in single words)    Negative: Looks like a broken bone or dislocated joint (e.g., crooked or deformed)    Negative: Sounds like a life-threatening emergency to the triager    Negative: Difficulty breathing at rest    Negative: Entire foot is cool or blue in comparison to other side    Negative: [1] Can't walk or can barely walk AND [2] new onset    Negative: [1] Difficulty breathing with exertion (e.g., walking) AND [2] new onset or worsening    Negative: [1] Red area or streak AND [2] fever    Negative: [1] Swelling is painful to touch AND [2] fever    Negative: [1] Cast on leg or ankle AND [2] now increased pain    Negative: Patient sounds very sick or weak to the triager    Protocols used: LEG SWELLING AND EDEMA-A-AH      "

## 2020-03-08 NOTE — DISCHARGE INSTRUCTIONS
Please make an appointment to follow up with Your Primary Care Provider and Hematology Clinic (phone: [phone]) in 3-5 days for further evaluation and recommendations.  You will need longer anticoagulation and it is important to follow-up to receive prescription for more medications prior to your Lovenox injections being used up.  If your swelling worsens or develop skin color changes in your right leg with or without pain please come back to the emergency department.

## 2020-03-11 ENCOUNTER — TELEPHONE (OUTPATIENT)
Dept: FAMILY MEDICINE | Facility: CLINIC | Age: 70
End: 2020-03-11

## 2020-03-11 NOTE — TELEPHONE ENCOUNTER
Oncology/Surgical Oncology Referral Request:     Specialty Requested: Medical Oncology - Hematology    Referring Provider: Lisa Martinez DO    Referring Clinic/Organization: [facility]    Records location: [facility]     Requested Provider (if specified): Not Specified      [facility]. She is waiting to hear back from clinic.

## 2020-03-16 ENCOUNTER — MYC MEDICAL ADVICE (OUTPATIENT)
Dept: FAMILY MEDICINE | Facility: CLINIC | Age: 70
End: 2020-03-16

## 2020-03-16 ENCOUNTER — OFFICE VISIT (OUTPATIENT)
Dept: HEMATOLOGY | Facility: CLINIC | Age: 70
End: 2020-03-16
Attending: PHYSICIAN ASSISTANT
Payer: COMMERCIAL

## 2020-03-16 VITALS
BODY MASS INDEX: 15.01 KG/M2 | DIASTOLIC BLOOD PRESSURE: 76 MMHG | HEART RATE: 63 BPM | SYSTOLIC BLOOD PRESSURE: 138 MMHG | HEIGHT: 66 IN | TEMPERATURE: 97.5 F | WEIGHT: 93.4 LBS | OXYGEN SATURATION: 95 % | RESPIRATION RATE: 14 BRPM

## 2020-03-16 DIAGNOSIS — C44.90 NON-MELANOMA SKIN CANCER: ICD-10-CM

## 2020-03-16 DIAGNOSIS — I82.4Y1 ACUTE DEEP VEIN THROMBOSIS (DVT) OF PROXIMAL VEIN OF RIGHT LOWER EXTREMITY (H): Primary | ICD-10-CM

## 2020-03-16 PROCEDURE — G0463 HOSPITAL OUTPT CLINIC VISIT: HCPCS

## 2020-03-16 PROCEDURE — 99204 OFFICE O/P NEW MOD 45 MIN: CPT | Performed by: PHYSICIAN ASSISTANT

## 2020-03-16 ASSESSMENT — PAIN SCALES - GENERAL: PAINLEVEL: NO PAIN (0)

## 2020-03-16 ASSESSMENT — MIFFLIN-ST. JEOR: SCORE: 976.41

## 2020-03-16 NOTE — PROGRESS NOTES
"    [facility]  [address] [place] [id number]  Main: [phone], Fax: [phone]    Patient seen at: [facility] at [address] [place] [address]    Outpatient Visit Note:    Patient: Maribel Yang  MRN: [id number]  : 1950  JESSEE: 2020    Reason:  History of DVT. Recent symptomatic right leg DVT diagnosed on 3/7/2020. Here for consultation for anticoagulation therapy management.     HPI:  This is a 69 year old female with a remote history of right leg DVT back when she was 17 years of age apparently S/P thrombectomy at the time, who also has a history of squamous cell lung cancer S/P XRT in  and diagnosis of anal cancer 2 years ago for which she under surgical excision and chemoradiation as well as Alport disease S/P renal transplantation, referred by her primary care provider, Dr. Lisa Martinez, for consultation in regard to anticoagulation therapy management.     Maribel first right leg DVT event apparently occurred back when she was 17 years of age. She recalls that she apparently was being evaluated for Alport disease at the time and was undergoing some type of procedures. She then was told that she had a \"blood clot\" in her right leg. At the time, she recalls that she underwent some sort of procedure to \"take out the clot\" via her groin (which sounds like to me was a thrombectomy procedure). Obviously Maribel does not remember much about this venous thrombosis event.     She underwent XRT treatment for her squamous cell lung cancer in  without any further recurrence. In regard to her Alport disease, she underwent renal transplant back in  and currently is doing well. She does have some renal insufficiency. In regard to her anal carcinoma, it was stage T1 without pelvic or inguinal lymphadenopathy. From what I can gather, she has completed her routine surveillance and cleared by Oncology to stop " further follow up surveillance imaging studies af of 2/21/2020.     She also apparently has a history of PAD / Claudication for which she has been on Pletal and Plavix for the past 4-5 years.     About 2 days prior to 3/7/2020, she started develop some increase swelling of her right leg. This had persisted and worsened on 3/7/2020 and thus she presented to the local emergency department for further evaluation. At the time, an ultrasound of the right leg was done and she was found to have an occlusive thrombus in the right common femoral, femoral and popliteal veins. At the time, because of her renal insufficiency, she was placed on a reduced dose of enoxaparin at 50 mg SubQ Q 24 hours dosing and was discharged home.     Maribel reports that she does not like doing self injections. She reports that her right leg symptoms has improved significantly but still has swelling. Denies any significant pain. She denies any enoxaparin injection site ecchymosis or bruising.    Past Medical History:  Past Medical History:   Diagnosis Date     Abnormal coagulation profile     p [id number] heterozygote      Age-related osteoporosis without current pathological fracture 6/22/2019     Anemia      Antiplatelet or antithrombotic long-term use      ASCUS with positive high risk HPV 2007, 2015    + HPV 56, 54,& 6, colp - TAL III, Leep =TAL II     Basal cell carcinoma      Hypertension      Immunosuppressed status (H)     due meds     Kidney replaced by transplant 9/04    Living donor recipient,  Rejection 7/2005     LSIL (low grade squamous intraepithelial lesion) on Pap smear 4/2013    +HPV 33 or 45, 61       PAD (peripheral artery disease) (H)      PONV (postoperative nausea and vomiting)      Squamous cell lung cancer (H)      Thrombosis of leg 1967     Unspecified disorder of kidney and ureter     X-linked dominant Alport's syndrome.       Past Surgical History:  Past Surgical History:   Procedure Laterality Date     BIOPSY ANAL N/A  3/14/2018    Procedure: BIOPSY ANAL;;  Surgeon: Shabbir Leo MD;  [facility]     C NONSPECIFIC PROCEDURE      Thrombectomy     C NONSPECIFIC PROCEDURE  1955 and 1959    Bilater eye surgery - correction for crossed eyes     C NONSPECIFIC PROCEDURE  1998    oopherectomy L     C NONSPECIFIC PROCEDURE  1967    open kidney biopsy - L     C TRANSPLANTATION OF KIDNEY  9/04    recipient -- done at [facility]     COLONOSCOPY       COLONOSCOPY N/A 8/9/2017    Procedure: COMBINED COLONOSCOPY, SINGLE OR MULTIPLE BIOPSY/POLYPECTOMY BY BIOPSY;;  Surgeon: Sushil Hyatt MD;  [facility]     COLPOSCOPY,LOOP ELECTRD CERVIX EXCIS  03/11/08    TAL II     CONIZATION LEEP  7/17/2013    Procedure: CONIZATION LEEP;;  Surgeon: Liliana Renteria MD;  [facility]     CONIZATION LEEP N/A 8/17/2016    Procedure: CONIZATION LEEP;  Surgeon: Liliana Renteria MD;  [facility]     EXAM UNDER ANESTHESIA ANUS  7/15/2014    Procedure: EXAM UNDER ANESTHESIA ANUS;  Surgeon: Radha Musa MD;  [facility]     EXAM UNDER ANESTHESIA ANUS N/A 3/14/2018    Procedure: EXAM UNDER ANESTHESIA ANUS;  Anal Exam Under Anesthesia With Excision of anal lesion, proctoscopy;  Surgeon: Shabbir Leo MD;  [facility]     EYE SURGERY       LASER CO2 EXCISE VULVA WIDE LOCAL  7/15/2014    Procedure: LASER CO2 EXCISE VULVA WIDE LOCAL;  Surgeon: Liliana Renteria MD;  [facility]     LASER CO2 VAGINA  7/17/2013    Procedure: LASER CO2 VAGINA;;  Surgeon: Liliana Renteria MD;  [facility]     LASER CO2 VAGINA N/A 9/25/2018    Procedure: LASER CO2 VAGINA;  Exam Under Anesthesia, CO2 Laser Ablation of Upper Vagina and Cervix;  Surgeon: Ptai Garcia MD;  [facility]     MICROSCOPY ANAL  7/17/2013    Procedure: MICROSCOPY ANAL;  Anal Microscopy,  EUA vagina,Colposcopy Of Vagina And Vulva, Vaginal Biopsies, Omniguide Co2 Laser To Vagina and vulva, Loop Electrosurgical Excision Procedure To Cervix;  Surgeon:  Radha Musa MD;  [facility]     MICROSCOPY ANAL  7/15/2014    Procedure: MICROSCOPY ANAL;  Surgeon: Radha Musa MD;  [facility]       Medications:  Current Outpatient Medications   Medication Sig Dispense Refill     ACETAMINOPHEN PO Take 1-2 tablets by mouth every 8 hours as needed for pain       acetaminophen-codeine (TYLENOL WITH CODEINE #3) 300-30 MG per tablet Take 1-2 tablets by mouth every 6 hours as needed for pain 50 tablet 0     amoxicillin (AMOXIL) 500 MG capsule Take 4 capsules (2,000 mg) by mouth once as needed Prior to dental procedures 16 capsule 3     apixaban ANTICOAGULANT (ELIQUIS ANTICOAGULANT) 5 MG tablet Take 1 tablet (5 mg) by mouth 2 times daily 60 tablet 3     atorvastatin (LIPITOR) 20 MG tablet TAKE ONE TABLET BY MOUTH EVERY DAY 30 tablet 1     calcium carbonate 600 mg-vitamin D 400 units (CALTRATE) 600-400 MG-UNIT per tablet Take 1 tablet by mouth 2 times daily       cilostazol (PLETAL) 100 MG tablet TAKE ONE TABLET BY MOUTH TWICE A DAY 60 tablet 6     clopidogrel (PLAVIX) 75 MG tablet TAKE ONE TABLET BY MOUTH EVERY DAY 30 tablet 6     COMPRESSION STOCKINGS Wear compression stockings on the right leg or both legs most time during the day and take them off at night. 2 each 2     Lactobacillus-Inulin ([facility] DIGESTIVE HEALTH) CAPS TAKE ONE CAPSULE BY MOUTH EVERY DAY 90 capsule 3     losartan (COZAAR) 25 MG tablet Take 1 tablet (25 mg) by mouth daily 90 tablet 5     metoprolol tartrate (LOPRESSOR) 100 MG tablet TAKE ONE TABLET BY MOUTH TWICE A  tablet 1     NIFEdipine ER OSMOTIC (PROCARDIA XL) 90 MG 24 hr tablet TAKE ONE TABLET BY MOUTH EVERY DAY 30 tablet 6     sirolimus (GENERIC EQUIVALENT) 1 MG tablet Take 2 tablets (2 mg) by mouth daily 180 tablet 3     calcium citrate (CITRACAL) 950 MG tablet Take 1 tablet (950 mg) by mouth 2 times daily (Patient not taking: Reported on 2/21/2020) 60 tablet 3     predniSONE (DELTASONE) 5 MG tablet TAKE ONE  TABLET BY MOUTH EVERY DAY 90 tablet 3     sirolimus (GENERIC EQUIVALENT) 1 MG tablet Take 2 tablets (2 mg) by mouth daily (Patient not taking: Reported on 2/21/2020) 6 tablet 0        Allergies:  Allergies   Allergen Reactions     Ultracet Nausea and Vomiting and Hives     Fentanyl Nausea     Has had since she initially had the issues with nausea and tolerated it OK.      Hydrocodone Nausea and Vomiting and Hives       ROS:  Denies any bleeding issues. No gum bleeding, No nose bleed. Denies any hematuria or blood in stools. Denies any ecchymosis. Denies any lower extremities swelling or pain. Denies any fever, no chest pain. Denies any shortness of breath.    Social History:  Denies any tobacco use. No significant alcohol use. Denies any illicit drug use.     Family History:  She reports that her 37 year old daughter apparently has a history of pulmonary embolism. As far as she can recall, her daughter was found to have a pulmonary embolism at around 30 years of age for which it was significant enough that it was reportedly life-threatening. Maribel recalls that her daughter was on estrogen containing OCP at the time and she was treated for a few months of anticoagulation therapy and stop taking estrogen containing products. She has a total of 3 children and a total of 6 siblings. She reports that other than her daughter, none of the other family members with history of DVT/PE.     Objectives:  Pleasant 69 year old female in no acute distress.  Vitals: B/P: 160/72, T: 97.5, P: 63, R: 14, Wt: 93 lbs 6.4 oz  Exam:   I briefly inspected her right leg without touching the patient today. She does clearly have significant swelling over the right lower extremity as compared to her left. No skin changes noted to be concern of venous insufficiency lesions.     Labs:  Component      Latest Ref Rng & Units 2/13/2020   WBC      4.0 - 11.0 10e9/L 6.0   RBC Count      3.8 - 5.2 10e12/L 4.83   Hemoglobin      11.7 - 15.7 g/dL 13.7    Hematocrit      35.0 - 47.0 % 43.5   MCV      78 - 100 fl 90   MCH      26.5 - 33.0 pg 28.4   MCHC      31.5 - 36.5 g/dL 31.5   RDW      10.0 - 15.0 % 14.7   Platelet Count      150 - 450 10e9/L 228   Diff Method       Automated Method   % Neutrophils      % 75.1   % Lymphocytes      % 8.7   % Monocytes      % 11.7   % Eosinophils      % 3.7   % Basophils      % 0.5   % Immature Granulocytes      % 0.3   Nucleated RBCs      0 /100 0   Absolute Neutrophil      1.6 - 8.3 10e9/L 4.5   Absolute Lymphocytes      0.8 - 5.3 10e9/L 0.5 (L)   Absolute Monocytes      0.0 - 1.3 10e9/L 0.7   Absolute Eosinophils      0.0 - 0.7 10e9/L 0.2   Absolute Basophils      0.0 - 0.2 10e9/L 0.0   Abs Immature Granulocytes      0 - 0.4 10e9/L 0.0   Absolute Nucleated RBC       0.0   Sodium      133 - 144 mmol/L 142   Potassium      3.4 - 5.3 mmol/L 3.4   Chloride      94 - 109 mmol/L 111 (H)   Carbon Dioxide      20 - 32 mmol/L 26   Anion Gap      3 - 14 mmol/L 5   Glucose      70 - 99 mg/dL 105 (H)   Urea Nitrogen      7 - 30 mg/dL 26   Creatinine      0.52 - 1.04 mg/dL 1.35 (H)   GFR Estimate      >60 mL/min/1.73:m2 40 (L)   GFR Estimate If Black      >60 mL/min/1.73:m2 47 (L)   Calcium      8.5 - 10.1 mg/dL 9.6   Bilirubin Total      0.2 - 1.3 mg/dL 0.3   Albumin      3.4 - 5.0 g/dL 3.3 (L)   Protein Total      6.8 - 8.8 g/dL 7.4   Alkaline Phosphatase      40 - 150 U/L 83   ALT      0 - 50 U/L 26   AST      0 - 45 U/L 16       Assessment:  In summary, Maribel is a 69 year old female who has a remote history of right leg DVT as a teenager, Lung cancer S/P XRT back in 2014, anal cancer S/P surgical and chemoradiation therapy in 2018, who has no evidence of recurrence of both cancers, who also has a history of Alport disease S/P renal transplant back in 2004, recently developed an unprovoked right leg DVT on 3/7/2020, presents to clinic today for consultation in regard to anticoagulation therapy management.    Maribel's remote DVT back  when she was 17 years of age seems to have been provoked. She was getting tested for Alport Disease. Again details are not entirely clear. From what I gathered, she did have to undergo thrombectomy at the time.     She has had no further DVT event until 3/7/2020 where she has acute onset of right leg swelling without any provoking factors. This was an unprovoked event. Just about one month prior to her diagnosis of DVT, she was cleared by Gyn/Oncology to stop surveillance imaging monitoring as her last CT Chest/Abd/Pelvis showed no further signs of cancer recurrence in Feb 2020. Thus, this DVT event in March 2020 is not considered to be a cancer related DVT.     Maribel also has a family history of pulmonary embolism. Her daughter has a what seems to be estrogen provoked pulmonary embolic event back when she was 30 years of age. In my review of her chart, there was some mention of Maribel having prothrombin gene mutation but I am not able to find any testing records in her chart to confirm this diagnosis.     Diagnosis:  1. Remote history of DVT of the right leg when she was 17 years of age.  2. Unprovoked right leg DVT on 3/7/2020.   3. History of lung cancer S/P XRT back in 2014. No evidence of recurrence.  4. History of anal cancer S/P surgery and chemoradiation therapy back in 2018. No evidence of recurrence.   5. Alport disease S/P renal transplant back in 2004.   6. Possible prothrombin gene mutation.   7. Family history of pulmonary embolism. Her daughter apparently was on estrogen OCP and developed a pulmonary embolic event when she was 30 years of age (she is currently 37).   8. Renal insufficiency.   9. History of claudication currently on antiplatelet therapy (Plavix and Pletal).     Plan:  I have a long discussion with Maribel in regard to her history of DVT, recommendations about treatment and all the considerations in regard to her other complex medical history.     I took some time to educate Maribel in  regard to DVT/PE. I explain to her that her DVT in 3/7/2020 was an unprovoked event and thus in accordance to current CHEST guidelines, she should be considered for candidacy to remain on long term anticoagulation therapy.     She is currently on enoxaparin at a reduced dose secondary to her renal function. However, it is unclear if her current dosing regimen of enoxaparin is within therapeutic range. Thus, she should be transitioned to an oral agent. We discuss options of oral agents including DOACs (Xarelto, Eliquis etc....) vs more traditional oral anticoagulation agent (coumadin). I explain to Maribel that in her particular case, Eliquis might be the best choice as it is mostly cleared hepatically. Compared to coumadin, Eliquis is definitely more convenient to take as no routine lab monitoring is required. After answering most of Maribel's questions to her satisfaction, she is in agreement with starting Eliquis at 5 mg PO Q 12 hours dosing. I will have her stop enoxaparin tonight and start Eliquis instead tonight.     I also explain to Maribel that she is at risk for bleeding complications with her co-current use of Plavix and Pletal and thus it is our recommendation that she should stop Plavix as of today. She can continue Pletal and will closely monitor for bleeding complications.     I will plan on seeing her back in 3 months to ensure she remains to be a good candidate to stay on long term anticoagulation therapy. We will also plan on repeating an ultrasound of the right leg to follow her thrombus at that time.    Plan Summary:  1. Stop enoxaparin today.  2. Stop Plavix today.  3. Start Eliquis at 5 mg PO Q 12 hours today.  4. Return to clinic in 3 months with repeat ultrasound of the right leg.     The patient is given our center's contact information and is instructed to call if she should have any further questions or concerns.    Payton Kwong, nurse clinician also saw the patient independently  today.  Patient understands and agrees with the above plan and recommendation.    Case discussed in details with Dr. Amanuel Moore, staff hematologist. He agrees with the above plan and recommendation.     Total Time Spent:  67 minutes, all 67 minutes was spent on face-to-face consultation of the patient and coordination of care in regard to her DVT and anticoagulation therapy management.    Time IN: 14:38  Time OUT: 15:45      Tyler Tomas PA-C, MPAS  Physician Assistant  [facility].

## 2020-03-16 NOTE — PATIENT INSTRUCTIONS
[facility]  [facility]  [facility] [address], [place] [address]  Main: [phone], Fax: [phone]    It was a pleasure seeing you today.  Thank you for allowing us to be involved in your care.  YOU are the reason we are here, and truly hope we provided you with the excellent service you deserve.  Please let us know if there is anything else we can do for you, so that we can be sure you are leaving completely satisfied with your care experience.  Please stay on your blood thinner:Abixaban 5 mg every 12 hours Stop the Plavix.   Please call us with any bleeding issues that you may have. Please continue to have your complete metabolic panel checked every 3 months.     We would like you to repeat your imaging in 3 months.  This can be arranged on the same day as your return appointment.  [facility] imaging is located in the East building (the smaller revolving door) across the street from our clinic ([address] [place] [address], [facility]).  Imaging check-in is located on the back side of the [facility] area.    Wear compression stockings if you have swelling in your leg. Take them off while you are sleeping. You may need to get new stockings every 3-6 months with regular wear.    Patient Education & Resources: Patient Thrombosis Education Day Event, hosted by the [facility], October 10, 2020 at the [facility] Auditorium.  More details to follow.  For additional information, please see the following web links:  www.stoptheclot.org, www.clotconnect.org.    Call the [facility]  at [phone].     -If surgeries or procedures are planned (for holding instructions).     -If off anticoagulation, please call during high risk times (long-distance travel, broken bones or trauma, immobilization, surgery, pregnancy, or taking estrogen).     -Any new symptoms of DVT (deep vein thrombosis) or PE (pulmonary  embolism)    -pain     -swelling     -redness    -warmth    -shortness of breath    -chest pain    -coughing up blood    We would like a provider on our team to see you at least annually for optimal care and to allow us to continue to prescribe for you.  Tyler Tomas PA-C and Maria Luz Webster PA-C are our physician assistants that are specialized in bleeding and clotting disorders that you may be able to see more readily.    Return to clinic 3 months.  Please schedule return appointment with Tyler Tomas PA-C

## 2020-03-17 NOTE — NURSING NOTE
Mrs. Yang is a 69 year old woman with Alport syndrome s/p renal transplant (2004), PAD, history of cancers and recurrent VTE's (most recent imaged 3/7/2020).  Maribel Yang is here for right LE proximal  DVT visit with Tyler Tomas PA-C .She is being seen for recent DVT    History    Date of clotting event (diagnosis)?  imaged 3/7/2020   Was this clotting event provoked, unprovoked or other? Other unknown   What type of clotting event was this? Right lower extremity proximal DVT   Was this the patient's first clotting event?   no   Known Laboratory Risk Factors Other, Not checked   Known Anatomic Anomalies None noted   Known Medical Risk Factors Other: Alport syndrome?   Known Drug and Environmental Risk Factors Steroids/including anabolic steroids (oral or IV used within 30 days prior to VTE)   Initial Treatment Other Lower dose enoxaparin secondary to decreased renal clearance   Current Treatment Eliquis 5 mg BID       if multiple clotting events type . Clot questions: Patient reports clotting event age 17 - details unknown  Visit Summary    Vitals were completed. Medications and allergies were reviewed by FELIX. BP rechecked due to systolic elevation    Plan: Eliquis 5 mg every 12 hours. Repeat ultrasound of right LE in 3 months at return visit    RN then educated patient about the signs, symptoms of and risk factors for venous thrombosis (VTE) and provided an educational book stephanie, which reviews these facts.     The AVS instructions were then updated. RN then thanked Mrs. Yang for coming and encouraged her to call should she have any questions or concerns.    Payton Kwong -[phone]

## 2020-03-17 NOTE — TELEPHONE ENCOUNTER
PN  Please see [employer] message  Doesn't look like Plavix was d/c'd at visit with Hematology  Thank you,  Aura Stiles RN

## 2020-03-18 DIAGNOSIS — I15.1 HYPERTENSION SECONDARY TO OTHER RENAL DISORDERS: ICD-10-CM

## 2020-03-18 DIAGNOSIS — Z94.0 KIDNEY REPLACED BY TRANSPLANT: ICD-10-CM

## 2020-03-18 DIAGNOSIS — I73.9 PERIPHERAL ARTERY DISEASE (H): ICD-10-CM

## 2020-03-18 RX ORDER — NIFEDIPINE 90 MG/1
TABLET, EXTENDED RELEASE ORAL
Qty: 30 TABLET | Refills: 6 | Status: SHIPPED | OUTPATIENT
Start: 2020-03-18 | End: 2020-10-19

## 2020-03-18 RX ORDER — METOPROLOL TARTRATE 100 MG
TABLET ORAL
Qty: 180 TABLET | Refills: 0 | Status: SHIPPED | OUTPATIENT
Start: 2020-03-18 | End: 2020-05-22

## 2020-03-18 RX ORDER — CILOSTAZOL 100 MG/1
TABLET ORAL
Qty: 60 TABLET | Refills: 6 | Status: SHIPPED | OUTPATIENT
Start: 2020-03-18 | End: 2020-10-19

## 2020-03-18 RX ORDER — PREDNISONE 5 MG/1
TABLET ORAL
Qty: 90 TABLET | Refills: 3 | Status: SHIPPED | OUTPATIENT
Start: 2020-03-18 | End: 2021-03-17

## 2020-03-18 NOTE — TELEPHONE ENCOUNTER
Metoprolol:  Prescription approved per [facility] Refill Protocol.    Procardia:  Routing refill request to provider for review/approval because:  Labs out of range:  Creatinine     Pletal:   Routing refill request to provider for review/approval because:  Labs out of range:  Creatinine     Dannielle DSOUZA RN

## 2020-03-18 NOTE — TELEPHONE ENCOUNTER
"NIFEDIPINE ER OSMOTIC RELEA 90 TB24   Last Written Prescription Date:  08/21/2019  Last Fill Quantity: 30,  # refills: 6   Last office visit: 3/13/2019 with prescribing provider:  DEBBIE   Future Office Visit:  Nothing at this time scheduled.      CILOSTAZOL 100MG TABS   Last Written Prescription Date:  08/21/2019  Last Fill Quantity: 60,  # refills: 6   Last office visit: 3/13/2019 with prescribing provider:  DEBBIE   Future Office Visit:  Nothing at this time scheduled.      METOPROLOL TARTRATE 100MG TABS   Last Written Prescription Date:  03/21/2019  Last Fill Quantity: 180,  # refills: 1   Last office visit: 3/13/2019 with prescribing provider:  PN   Future Office Visit:  Nothing at this time scheduled.    Requested Prescriptions   Pending Prescriptions Disp Refills     NIFEdipine ER OSMOTIC (PROCARDIA XL) 90 MG 24 hr tablet [Pharmacy Med Name: NIFEDIPINE ER OSMOTIC RELEA 90 TB24] 30 tablet 6     Sig: TAKE ONE TABLET BY MOUTH EVERY DAY       Calcium Channel Blockers Protocol  Failed - 3/18/2020  9:24 AM        Failed - Recent (12 mo) or future (30 days) visit within the authorizing provider's specialty     Patient has had an office visit with the authorizing provider or a provider within the authorizing providers department within the previous 12 mos or has a future within next 30 days. See \"Patient Info\" tab in inbasket, or \"Choose Columns\" in Meds & Orders section of the refill encounter.              Failed - Normal serum creatinine on file in past 12 months     Recent Labs   Lab Test 03/07/20  1948 02/13/20  1228   CR  --  1.35*   CREAT 1.4*  --        Ok to refill medication if creatinine is low          Passed - Blood pressure under 140/90 in past 12 months     BP Readings from Last 3 Encounters:   03/16/20 138/76   03/07/20 131/77   02/21/20 126/72                 Passed - Medication is active on med list        Passed - Patient is age 18 or older        Passed - No active pregnancy on record        Passed - No " "positive pregnancy test in past 12 months           cilostazol (PLETAL) 100 MG tablet [Pharmacy Med Name: CILOSTAZOL 100MG TABS] 60 tablet 6     Sig: TAKE ONE TABLET BY MOUTH TWICE A DAY       Platelet Inhibitors Failed - 3/18/2020  9:24 AM        Failed - Recent (12 mo) or future (30 days) visit within the authorizing provider's specialty     Patient has had an office visit with the authorizing provider or a provider within the authorizing providers department within the previous 12 mos or has a future within next 30 days. See \"Patient Info\" tab in inbasket, or \"Choose Columns\" in Meds & Orders section of the refill encounter.              Failed - Normal serum creatinine on file in past 12 months     Recent Labs   Lab Test 03/07/20  1948 02/13/20  1228   CR  --  1.35*   CREAT 1.4*  --        Ok to refill medication if creatinine is low          Passed - Normal HGB on file in past 12 months     Recent Labs   Lab Test 02/13/20  1228   HGB 13.7               Passed - Normal Platelets on file in past 12 months     Recent Labs   Lab Test 02/13/20  1228                  Passed - Medication is active on med list        Passed - Patient is age 18 or older        Passed - No active pregnancy on record        Passed - No positive pregnancy test in past 12 months           metoprolol tartrate (LOPRESSOR) 100 MG tablet [Pharmacy Med Name: METOPROLOL TARTRATE 100MG TABS] 180 tablet 1     Sig: TAKE ONE TABLET BY MOUTH TWICE A DAY       Beta-Blockers Protocol Failed - 3/18/2020  9:24 AM        Failed - Recent (12 mo) or future (30 days) visit within the authorizing provider's specialty     Patient has had an office visit with the authorizing provider or a provider within the authorizing providers department within the previous 12 mos or has a future within next 30 days. See \"Patient Info\" tab in inbasket, or \"Choose Columns\" in Meds & Orders section of the refill encounter.              Passed - Blood pressure under " 140/90 in past 12 months     BP Readings from Last 3 Encounters:   03/16/20 138/76   03/07/20 131/77   02/21/20 126/72                 Passed - Patient is age 6 or older        Passed - Medication is active on med list
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
## 2020-04-14 DIAGNOSIS — E78.00 HYPERCHOLESTEREMIA: ICD-10-CM

## 2020-04-14 RX ORDER — ATORVASTATIN CALCIUM 20 MG/1
TABLET, FILM COATED ORAL
Qty: 90 TABLET | Refills: 0 | Status: SHIPPED | OUTPATIENT
Start: 2020-04-14 | End: 2020-07-15

## 2020-04-14 NOTE — TELEPHONE ENCOUNTER
"Requested Prescriptions   Pending Prescriptions Disp Refills     atorvastatin (LIPITOR) 20 MG tablet [Pharmacy Med Name: ATORVASTATIN CALCIUM 20MG TABS]  Last Written Prescription Date:  8/21/2019  Last Fill Quantity: 30 tablet,  # refills: 1   Last office visit: 3/13/2019 with prescribing provider:  Michelle   Future Office Visit:     90 tablet 0     Sig: TAKE ONE TABLET BY MOUTH EVERY DAY       Statins Protocol Failed - 4/14/2020 10:48 AM        Failed - Recent (12 mo) or future (30 days) visit within the authorizing provider's specialty     Patient has had an office visit with the authorizing provider or a provider within the authorizing providers department within the previous 12 mos or has a future within next 30 days. See \"Patient Info\" tab in inbasket, or \"Choose Columns\" in Meds & Orders section of the refill encounter.              Passed - LDL on file in past 12 months     Recent Labs   Lab Test 02/13/20  1228   LDL 66             Passed - No abnormal creatine kinase in past 12 months     No lab results found.             Passed - Medication is active on med list        Passed - Patient is age 18 or older        Passed - No active pregnancy on record        Passed - No positive pregnancy test in past 12 months              "

## 2020-04-14 NOTE — TELEPHONE ENCOUNTER
Prescription approved per [facility] according to the [facility] Refill Protocol.  Due for physical July 2020  Dannielle DSOUZA RN

## 2020-04-17 ENCOUNTER — TELEPHONE (OUTPATIENT)
Dept: HEMATOLOGY | Facility: CLINIC | Age: 70
End: 2020-04-17

## 2020-05-19 ENCOUNTER — VIRTUAL VISIT (OUTPATIENT)
Dept: HEMATOLOGY | Facility: CLINIC | Age: 70
End: 2020-05-19
Attending: PHYSICIAN ASSISTANT
Payer: COMMERCIAL

## 2020-05-19 VITALS — WEIGHT: 93 LBS | BODY MASS INDEX: 14.98 KG/M2

## 2020-05-19 DIAGNOSIS — I82.4Y1 ACUTE DEEP VEIN THROMBOSIS (DVT) OF PROXIMAL VEIN OF RIGHT LOWER EXTREMITY (H): Primary | ICD-10-CM

## 2020-05-19 DIAGNOSIS — Z79.01 ANTICOAGULATED: ICD-10-CM

## 2020-05-19 PROCEDURE — 99207 ZZC CDG-CODE CATEGORY CHANGED: CPT | Performed by: PHYSICIAN ASSISTANT

## 2020-05-19 PROCEDURE — 99211 OFF/OP EST MAY X REQ PHY/QHP: CPT | Mod: 95 | Performed by: PHYSICIAN ASSISTANT

## 2020-05-19 NOTE — PROGRESS NOTES
Patient was contacted to complete the pre-visit call prior to their telephone visit with the provider.  The following statement was read:       This visit will be billed to your insurance the same as an in-person visit. Because of Coronavirus we are instituting telephone visits when possible to keep everyone safe. The telephone visit will be a call between you and the provider.  This service lets us provide the care you need with a telephone conversation.  If a prescription is necessary, we can send it directly to your pharmacy.If lab work or other testing is needed, we can help arrange a place/time for that to be done at a later date.If during the course of the call the provider feels a telephone visit is not appropriate, then your insurance company will not be billed.       Allergies and medications were reviewed and travel screening complete.     I thanked them for their time to cover this information.     Padilla Matos CMA

## 2020-05-19 NOTE — PROGRESS NOTES
"    [facility]  [address] [place] [address] [address], [place] [address]  Main: [phone], Fax: [phone]    Patient seen at: [facility] at [address] [place] [address]    Telephone (Return) Visit Note:    Due to the ongoing COVID-19 outbreak, this visit was conducted by telephone, with the patient's approval.    Patient: Maribel Yang  MRN: [id number]  : 1950  JESSEE: May 19, 2020    Reason:  History of DVT. Recent symptomatic right leg DVT diagnosed on 3/7/2020. Here for follow up.      HPI:  This is a 69 year old female with a remote history of right leg DVT back when she was 17 years of age apparently S/P thrombectomy at the time, who also has a history of squamous cell lung cancer S/P XRT in  and diagnosis of anal cancer 2 years ago for which she under surgical excision and chemoradiation as well as Alport disease S/P renal transplantation, participates in today's scheduled telephone visit for her follow up. Maribel was last seen by this writer back in 3/16/2020, please refer to my previous notes for her complete detail clinical history.      Briefly, Maribel first right leg DVT event apparently occurred back when she was 17 years of age. She recalls that she apparently was being evaluated for Alport disease at the time and was undergoing some type of procedures. She then was told that she had a \"blood clot\" in her right leg. At the time, she recalls that she underwent some sort of procedure to \"take out the clot\" via her groin (which sounds like to me was a thrombectomy procedure). Obviously Maribel does not remember much about this venous thrombosis event.      She underwent XRT treatment for her squamous cell lung cancer in  without any further recurrence. In regard to her Alport disease, she underwent renal transplant back in  and currently is doing well. She does have some renal insufficiency with her baseline creatinine at " around 1.3. In regard to her anal carcinoma, it was stage T1 without pelvic or inguinal lymphadenopathy. From what I can gather, she has completed her routine surveillance and cleared by Oncology to stop further follow up surveillance imaging studies af of 2/21/2020.      She also apparently has a history of PAD / Claudication for which she has been on Pletal and Plavix for the past 4-5 years.      About 2 days prior to 3/7/2020, she started develop some increase swelling of her right leg. This had persisted and worsened on 3/7/2020 and thus she presented to the local emergency department for further evaluation. At the time, an ultrasound of the right leg was done and she was found to have an occlusive thrombus in the right common femoral, femoral and popliteal veins. At the time, because of her renal insufficiency, she was placed on a reduced dose of enoxaparin at 50 mg SubQ Q 24 hours dosing and was discharged home.      Interim History:  Then back when I saw her back in 3/16/2020, it was my recommendation that she should discontinue Enoxaparin and transition over to one of the direct oral anticoagulant agents, specifically apixaban at 5 mg PO BID dosing. She has been on apixaban for about 2 months, then unfortunately, she is not currently eligible for the 's patient's assistants program. She is not financially able to afford her apixaban medication any longer. She apparently has ran out of apixaban back about 1.5 weeks ago and she has self started back on enoxaparin at 50 mg SubQ Q 24 hours dosing that she has remained back in March 2020. However, as of today, she has completely run out of the enoxaparin as well. Thus, today's scheduled telephone visit.     During her visit with me back in March 2020, I also recommend that she stop Plavix as combination with Eliquis will increase her risk of bleeding.     ROS:  She denies any bleeding issues. No frequent epistaxis. Denies any oral mucosal bleeding.  Denies any hematuria or blood in stools. She denies any shortness of breath or chest pain. Denies any issues with cough or fever. She has been staying home since mid March due to the COVID19 pandemic.     She also reports that her right leg swelling has improved since March 2020 although still has some swelling. Denies any pain in the lower extremity.     Medication, Allergies and PmHx:  All have been reviewed by this writer in the electronic medical records.    Social History and Family History:  Deferred.    Imaging:  US right leg on 3/7/2020:  1.  Occlusive thrombus in the right common femoral, femoral and popliteal veins.    Assessment:  In summary, Maribel is a 69 year old female who has a remote history of right leg DVT as a teenager, Lung cancer S/P XRT back in 2014, anal cancer S/P surgical and chemoradiation therapy in 2018, who has no evidence of recurrence of both cancers, who also has a history of Alport disease S/P renal transplant back in 2004, who developed an unprovoked right leg DVT on 3/7/2020, participates in today's scheduled telephone visit      Maribel's remote DVT back when she was 17 years of age seems to have been provoked. She was getting tested for Alport Disease. Again details are not entirely clear. From what I gathered, she did have to undergo thrombectomy at the time.      She has had no further DVT event until 3/7/2020 where she has acute onset of right leg swelling without any provoking factors. This was an unprovoked event. Just about one month prior to her diagnosis of DVT, she was cleared by Gyn/Oncology to stop surveillance imaging monitoring as her last CT Chest/Abd/Pelvis showed no further signs of cancer recurrence in Feb 2020. Thus, this DVT event in March 2020 is not considered to be a cancer related DVT.      Maribel also has a family history of pulmonary embolism. Her daughter has a what seems to be estrogen provoked pulmonary embolic event back when she was 30 years of age.  In my review of her chart, there was some mention of aMribel having prothrombin gene mutation but I am not able to find any testing records in her chart to confirm this diagnosis.      Diagnosis:  1. Remote history of DVT of the right leg when she was 17 years of age.  2. Unprovoked right leg DVT on 3/7/2020.   3. History of lung cancer S/P XRT back in 2014. No evidence of recurrence.  4. History of anal cancer S/P surgery and chemoradiation therapy back in 2018. No evidence of recurrence.   5. Alport disease S/P renal transplant back in 2004.   6. Possible prothrombin gene mutation.   7. Family history of pulmonary embolism. Her daughter apparently was on estrogen OCP and developed a pulmonary embolic event when she was 30 years of age (she is currently 37).   8. Renal insufficiency.   9. History of claudication currently on antiplatelet therapy (Plavix and Pletal). Plavix was stopped by this writer back in March 2020.     Plan:  As mentioned in my note back in March 2020, since her right leg DVT event back in March 2020 was an unprovoked event, it is my recommendation, and also in accordance to current guidelines, that she should remain on indefinite anticoagulation therapy. Unfortunately, she is not able to afford the cost of apixaban after 2 months of being on the medication. She will not be able to meet the 's patient's assistants program eligibility for 1-2 months. Thus she will need alternative anticoagulation therapy in the interim. These options are:    1. Option #1: Transition to coumadin. The issues with transitioning her to coumadin currently is difficult due to the COVID19 pandemic in this patient who is considered to be high risk for severe COVID19 symptoms if she should be infected.   2. Option #2: Restart enoxaparin. She will need renal adjusted dosing as she has a history of Alport disease and S/P renal transplant. Although her Creatinine has been stable for many years at 1.3 at baseline.  With renal adjusted dosing, which will be 1 mg/kg SubQ Q 24 hours. If we were to place her on 1 mg/kg SubQ Q 12 hours dosing, she will need at least one Anti-Xa heparin blood level done and adjust the dose as needed. But again this might be difficult for her to get this done during the COVID19 pandemic.     After some discussion with the patient, we have elected to go with Option #2 with renal adjusted dosing of enoxaparin at 50 mg SubQ Q 24 hours (1 mg/kg SubQ Q 24 hours). I have provided her with a prescription of enoxaparin at this dose for the next 2 months. She is instructed to call our center if she should be able to get her apixaban medication once again or once she became eligible for the 's patients assistants program.     I will plan on seeing her back in 2 months time after a repeat ultrasound of the right leg. At that point, we need to re-consider the options of her on going long term anticoagulation therapy, might need to consider transitioning her to coumadin at that time.     Call started: 14:32  Call ended: 14:41      Tyler Tomas PA-C, MPAS  Physician Assistant  [facility].

## 2020-05-19 NOTE — PATIENT INSTRUCTIONS
Maribel,    It was nice to talk to you on today's scheduled telephone visit.    Below is what we have discussed during our visit:  1. Continue with enoxaparin at 50 mg subcutaneous injections every 24 hours.  2. One of the administrative assistants and/or nursing staff will call you to schedule a return visit with me in 2 months as well as scheduling a repeat ultrasound of the right leg in 2 months.  3. Please call our center at [phone] and ask to speak to one of the nursing staffs if you should have any further questions or concerns.    Thank you once again in choosing our clinic as part of your health care team.      Tyler Tomas PA-C, MPAS  Physician Assistant  [facility].

## 2020-05-21 DIAGNOSIS — I15.1 HYPERTENSION SECONDARY TO OTHER RENAL DISORDERS: ICD-10-CM

## 2020-05-22 NOTE — TELEPHONE ENCOUNTER
Due for virtual visit  Left non detailed VM for pt asking that they callback and schedule   Dannielle DSOUZA RN

## 2020-05-27 RX ORDER — METOPROLOL TARTRATE 100 MG
TABLET ORAL
Qty: 180 TABLET | Refills: 0 | Status: SHIPPED | OUTPATIENT
Start: 2020-05-27 | End: 2020-09-18

## 2020-05-27 NOTE — TELEPHONE ENCOUNTER
PN,  Left VM #2 for patient  See below messages  No response from patient to our outreach  Please advise on further refill  Thanks,  Dannielle DSOUZA RN

## 2020-05-29 ENCOUNTER — VIRTUAL VISIT (OUTPATIENT)
Dept: FAMILY MEDICINE | Facility: CLINIC | Age: 70
End: 2020-05-29
Payer: COMMERCIAL

## 2020-05-29 DIAGNOSIS — I12.9 RENAL HYPERTENSION, STAGE 1-4 OR UNSPECIFIED CHRONIC KIDNEY DISEASE: ICD-10-CM

## 2020-05-29 DIAGNOSIS — I82.4Y9 ACUTE DEEP VEIN THROMBOSIS (DVT) OF PROXIMAL VEIN OF LOWER EXTREMITY, UNSPECIFIED LATERALITY (H): Primary | ICD-10-CM

## 2020-05-29 PROCEDURE — 99214 OFFICE O/P EST MOD 30 MIN: CPT | Mod: 95 | Performed by: FAMILY MEDICINE

## 2020-05-29 RX ORDER — LOSARTAN POTASSIUM 50 MG/1
50 TABLET ORAL DAILY
Qty: 90 TABLET | Refills: 1 | Status: SHIPPED | OUTPATIENT
Start: 2020-05-29 | End: 2020-12-11

## 2020-05-29 NOTE — PROGRESS NOTES
"Maribel Yang is a 69 year old female who is being evaluated via a billable telephone visit.      The patient has been notified of following:     \"This telephone visit will be conducted via a call between you and your physician/provider. We have found that certain health care needs can be provided without the need for a physical exam.  This service lets us provide the care you need with a short phone conversation.  If a prescription is necessary we can send it directly to your pharmacy.  If lab work is needed we can place an order for that and you can then stop by our lab to have the test done at a later time.    Telephone visits are billed at different rates depending on your insurance coverage. During this emergency period, for some insurers they may be billed the same as an in-person visit.  Please reach out to your insurance provider with any questions.    If during the course of the call the physician/provider feels a telephone visit is not appropriate, you will not be charged for this service.\"    Patient has given verbal consent for Telephone visit?  Yes    What phone number would you like to be contacted at? [phone]    How would you like to obtain your AVS? Tuan Jaquez     Maribel Yang is a 69 year old female who presents via phone visit today for the following health issues:    HPI  Hyperlipidemia Follow-Up      Are you regularly taking any medication or supplement to lower your cholesterol?   Yes- Atorvastatin and cozaar    Are you having muscle aches or other side effects that you think could be caused by your cholesterol lowering medication?  No    Hypertension Follow-up      Do you check your blood pressure regularly outside of the clinic? Yes     Are you following a low salt diet? Yes    Are your blood pressures ever more than 140 on the top number (systolic) OR more   than 90 on the bottom number (diastolic), for example 140/90? No      How many servings of fruits and " vegetables do you eat daily?  4 or more    On average, how many sweetened beverages do you drink each day (Examples: soda, juice, sweet tea, etc.  Do NOT count diet or artificially sweetened beverages)?   0    How many days per week do you exercise enough to make your heart beat faster? 3 or less    How many minutes a day do you exercise enough to make your heart beat faster? 9 or less    How many days per week do you miss taking your medication? 0    DVT dx 3/7/20  Was on Eliquis - unfortunately has not been able to get insurance to cover  Is using the lovenox until she will be eligible -   Stopped the plavix -   Continue the pletal    Has sore throat and ear ache - only on one side - right   No sores or redness in throat  Seems to pass back and forth from her daughter -   Tried drops in the ear because so plugged  Ear and jaw area painful - has been almost two weeks  Really bad at onset and getting a little better -   Has not used flonase -     Blood pressure - 160/72 initial and recheck was better    Batteries ran of machine so has not been able to check  States many of the values were high at doctors visits  No symptoms        -------------------------------------    Patient Active Problem List   Diagnosis     Other specified congenital anomalies     Kidney replaced by transplant     S/P LEEP of cervix     CARDIOVASCULAR SCREENING; LDL GOAL LESS THAN 100     Immunosuppression (H)     HTN, kidney transplant related     History of basal cell carcinoma     YUNIOR III (vulvar intraepithelial neoplasia III)     Peripheral artery disease (H)     Squamous cell lung cancer (H)     Lumbago     Vaginal dysplasia     Alopecia     Cherry angioma     Skin cancer screening     Intertrigo     History of basal cell carcinoma     Malignant neoplasm of anal canal (H)     Aftercare following organ transplant     Age-related osteoporosis without current pathological fracture     Acute deep vein thrombosis (DVT) of proximal vein of  lower extremity, unspecified laterality (H)     Past Surgical History:   Procedure Laterality Date     BIOPSY ANAL N/A 3/14/2018    Procedure: BIOPSY ANAL;;  Surgeon: Shabbir Leo MD;  Location: [facility]     C NONSPECIFIC PROCEDURE      Thrombectomy     C NONSPECIFIC PROCEDURE  1955 and 1959    Bilater eye surgery - correction for crossed eyes     C NONSPECIFIC PROCEDURE  1998    oopherectomy L     C NONSPECIFIC PROCEDURE  1967    open kidney biopsy - L     C TRANSPLANTATION OF KIDNEY  9/04    recipient -- done at [facility]     COLONOSCOPY       COLONOSCOPY N/A 8/9/2017    Procedure: COMBINED COLONOSCOPY, SINGLE OR MULTIPLE BIOPSY/POLYPECTOMY BY BIOPSY;;  Surgeon: Sushil Hyatt MD;  [facility]     COLPOSCOPY,LOOP ELECTRD CERVIX EXCIS  03/11/08    TAL II     CONIZATION LEEP  7/17/2013    Procedure: CONIZATION LEEP;;  Surgeon: Liliana Renteria MD;  Location: [facility]     CONIZATION LEEP N/A 8/17/2016    Procedure: CONIZATION LEEP;  Surgeon: Liliana Renteria MD;  [facility]     EXAM UNDER ANESTHESIA ANUS  7/15/2014    Procedure: EXAM UNDER ANESTHESIA ANUS;  Surgeon: Radha Musa MD;  [facility]     EXAM UNDER ANESTHESIA ANUS N/A 3/14/2018    Procedure: EXAM UNDER ANESTHESIA ANUS;  Anal Exam Under Anesthesia With Excision of anal lesion, proctoscopy;  Surgeon: Shabbir Leo MD;  Location: [facility]     EYE SURGERY       LASER CO2 EXCISE VULVA WIDE LOCAL  7/15/2014    Procedure: LASER CO2 EXCISE VULVA WIDE LOCAL;  Surgeon: Liliana Renteria MD;  [facility]     LASER CO2 VAGINA  7/17/2013    Procedure: LASER CO2 VAGINA;;  Surgeon: Liliana Renteria MD;  [facility]     LASER CO2 VAGINA N/A 9/25/2018    Procedure: LASER CO2 VAGINA;  Exam Under Anesthesia, CO2 Laser Ablation of Upper Vagina and Cervix;  Surgeon: Pati Garcia MD;  Location[facility]     MICROSCOPY ANAL  7/17/2013    Procedure: MICROSCOPY ANAL;  Anal Microscopy,  EUA vagina,Colposcopy Of Vagina And Vulva,  Vaginal Biopsies, Omniguide Co2 Laser To Vagina and vulva, Loop Electrosurgical Excision Procedure To Cervix;  Surgeon: Radha Musa MD;  Location: [facility]     MICROSCOPY ANAL  7/15/2014    Procedure: MICROSCOPY ANAL;  Surgeon: Radha Musa MD;  Location[facility]       Social History     Tobacco Use     Smoking status: Former Smoker     Packs/day: 0.30     Years: 35.00     Pack years: 10.50     Types: Cigarettes     Start date: 1967     Smokeless tobacco: Never Used   Substance Use Topics     Alcohol use: Yes     Alcohol/week: 0.0 standard drinks     Frequency: Monthly or less     Drinks per session: 1 or 2     Binge frequency: Less than monthly     Comment: rarely     Family History   Problem Relation Age of Onset     Diabetes Father         type 2 diag age,60's     Alcohol/Drug Father      Arthritis Father      Hypertension Father      Lipids Father         high cholesterol     Arthritis Mother      Diabetes Mother      Depression Mother      Heart Disease Mother      Neurologic Disorder Mother      Obesity Mother      Psychotic Disorder Mother      Thyroid Disease Mother      Gynecology Sister         Precancerous cell removal from cervix at age 45     Depression Sister      Allergies Sister      Alcohol/Drug Sister      Neurologic Disorder Sister      Cerebrovascular Disease Paternal Grandmother          of a stroke in her 80's     Diabetes Paternal Grandmother      Alcohol/Drug Son      Colon Polyps Sister      Colon Cancer No family hx of      Crohn's Disease No family hx of      Ulcerative Colitis No family hx of      Melanoma No family hx of      Skin Cancer No family hx of            Reviewed and updated as needed this visit by Provider         Review of Systems   Constitutional, HEENT, cardiovascular, pulmonary, GI, , musculoskeletal, neuro, skin, endocrine and psych systems are negative, except as otherwise noted.       Objective   Reported vitals:  There were no vitals  taken for this visit.   alert and no distress  PSYCH: Alert and oriented times 3; coherent speech, normal   rate and volume, able to articulate logical thoughts, able   to abstract reason, no tangential thoughts, no hallucinations   or delusions  Her affect is normal  RESP: No cough, no audible wheezing, able to talk in full sentences  Remainder of exam unable to be completed due to telephone visits    Diagnostic Test Results:  Labs reviewed in Epic        Assessment/Plan:  1. Renal hypertension, stage 1-4 or unspecified chronic kidney disease  HTN - complicated by hx of Kidney transplant for Alport Syndrome   BP readings have been higher -   Is on nifedipine ER 90mg, losartan and metoprolol   Will increase losartan from 25mg up to 50mg for now  Will need labs in the next 2-3 weeks to make sure creatinine and potassium stay at baseline.  - losartan (COZAAR) 50 MG tablet; Take 1 tablet (50 mg) by mouth daily  Dispense: 90 tablet; Refill: 1  - Comprehensive metabolic panel (BMP + Alb, Alk Phos, ALT, AST, Total. Bili, TP); Future    2. Acute deep vein thrombosis (DVT) of proximal vein of lower extremity, unspecified laterality (H)   appreciated hematology -   Pt doing well  Still small swelling in the calf but improved      No follow-ups on file.      Phone call duration:  21 minutes    Lisa Martinez DO       Breath sounds clear and equal bilaterally.

## 2020-07-15 DIAGNOSIS — E78.00 HYPERCHOLESTEREMIA: ICD-10-CM

## 2020-07-15 RX ORDER — ATORVASTATIN CALCIUM 20 MG/1
TABLET, FILM COATED ORAL
Qty: 90 TABLET | Refills: 1 | Status: SHIPPED | OUTPATIENT
Start: 2020-07-15 | End: 2020-12-11

## 2020-07-15 NOTE — TELEPHONE ENCOUNTER
Pres[place] per [facility] [place]  Dannielle DSOUZA RN

## 2020-08-04 ENCOUNTER — ANCILLARY PROCEDURE (OUTPATIENT)
Dept: ULTRASOUND IMAGING | Facility: CLINIC | Age: 70
End: 2020-08-04
Attending: PHYSICIAN ASSISTANT
Payer: COMMERCIAL

## 2020-08-04 DIAGNOSIS — I82.4Y1 ACUTE DEEP VEIN THROMBOSIS (DVT) OF PROXIMAL VEIN OF RIGHT LOWER EXTREMITY (H): ICD-10-CM

## 2020-08-04 DIAGNOSIS — Z79.01 ANTICOAGULATED: ICD-10-CM

## 2020-08-06 ENCOUNTER — VIRTUAL VISIT (OUTPATIENT)
Dept: HEMATOLOGY | Facility: CLINIC | Age: 70
End: 2020-08-06
Attending: PHYSICIAN ASSISTANT
Payer: COMMERCIAL

## 2020-08-06 DIAGNOSIS — C44.90 NON-MELANOMA SKIN CANCER: ICD-10-CM

## 2020-08-06 DIAGNOSIS — I82.4Y1 ACUTE DEEP VEIN THROMBOSIS (DVT) OF PROXIMAL VEIN OF RIGHT LOWER EXTREMITY (H): ICD-10-CM

## 2020-08-06 DIAGNOSIS — Z79.01 CHRONIC ANTICOAGULATION: Primary | ICD-10-CM

## 2020-08-06 PROCEDURE — 99442 ZZC PHYSICIAN TELEPHONE EVALUATION 11-20 MIN: CPT | Mod: 95 | Performed by: PHYSICIAN ASSISTANT

## 2020-08-06 PROCEDURE — 40001009 ZZH VIDEO/TELEPHONE VISIT; NO CHARGE

## 2020-08-06 NOTE — PROGRESS NOTES
"    [facility]  [address] [place] [address] [address], [place] [address]  Main: [phone], Fax: [phone]    Patient seen at: [facility] at [address] [place] [address]    Telephone (Return) Visit Note:    Due to the ongoing COVID-19 outbreak, this visit was conducted by telephone, with the patient's approval.    Patient: Maribel Yang  MRN: [id number]  : 1950  JESSEE: 2020    Reason:  History of DVT. Recent symptomatic right leg DVT diagnosed on 3/7/2020. Here for follow up.      HPI:  This is a 69 year old female with a remote history of right leg DVT back when she was 17 years of age apparently S/P thrombectomy at the time, who also has a history of squamous cell lung cancer S/P XRT in  and diagnosis of anal cancer 2 years ago for which she under surgical excision and chemoradiation as well as Alport disease S/P renal transplantation, participates in today's scheduled telephone visit for her follow up. Maribel was last seen by this writer back in 3/16/2020 and again on 2020 (via telephone visit), please refer to my previous notes for her complete detail clinical history.      Briefly, Maribel first right leg DVT event apparently occurred back when she was 17 years of age. She recalls that she apparently was being evaluated for Alport disease at the time and was undergoing some type of procedures. She then was told that she had a \"blood clot\" in her right leg. At the time, she recalls that she underwent some sort of procedure to \"take out the clot\" via her groin (which sounds like to me was a thrombectomy procedure). Obviously Maribel does not remember much about this venous thrombosis event.      She underwent XRT treatment for her squamous cell lung cancer in  without any further recurrence. In regard to her Alport disease, she underwent renal transplant back in  and currently is doing well. She does have some renal " insufficiency with her baseline creatinine at around 1.3-1.4. In regard to her anal carcinoma, it was stage T1 without pelvic or inguinal lymphadenopathy. From what I can gather, she has completed her routine surveillance and cleared by Oncology to stop further follow up surveillance imaging studies as of 2/21/2020.      She also apparently has a history of PAD / Claudication for which she has been on Pletal and Plavix for the past 4-5 years.      About 2 days prior to 3/7/2020, she started develop some increase swelling of her right leg. This had persisted and worsened on 3/7/2020 and thus she presented to the local emergency department for further evaluation. At the time, an ultrasound of the right leg was done and she was found to have an occlusive thrombus in the right common femoral, femoral and popliteal veins. At the time, because of her renal insufficiency, she was placed on a reduced dose of enoxaparin at 50 mg SubQ Q 24 hours dosing and was discharged home.      Then back when I saw her back in 3/16/2020, it was my recommendation that she should discontinue Enoxaparin and transition over to one of the direct oral anticoagulant agents, specifically apixaban at 5 mg PO BID dosing. She has been on apixaban for about 2 months, then unfortunately, she is not currently eligible for the 's patient's assistants program. She is not financially able to afford her apixaban medication any longer.     During her visit with me back in March 2020, I also recommend that she stop Plavix as combination with Eliquis will increase her risk of bleeding.     Interim History:  During my visit with her on 5/19/2020, because of the fact that she was not able to continue to afford her apixaban, the decision was made at the time to switch her to either Coumadin or enoxaparin. She elected enoxaparin and has maintained on enoxaparin with renal adjusted dose of 1 mg/kg subQ Q 24 hours dosing.     She has her repeat  ultrasound of her right leg done on 8/4/2020 and she is participating in this telephone visit for follow up and to discuss the result of this ultrasound.     She reports that she has done well with enoxaparin without any reports of bleeding issues. Denies any frequent epistaxis, no issues with oral mucosal bleeding. Denies any hematuria or blood in stools. However, she would like to transition to an oral anticoagulant if possible.     ROS:  As above. Denies any shortness of breath or chest pain. No fever.     Medication, Allergies and PmHx:  All have been reviewed by this writer in the electronic medical records.    Social History and Family History:  Deferred.    Labs:  Component      Latest Ref Rng & Units 2/13/2020   WBC      4.0 - 11.0 10e9/L 6.0   RBC Count      3.8 - 5.2 10e12/L 4.83   Hemoglobin      11.7 - 15.7 g/dL 13.7   Hematocrit      35.0 - 47.0 % 43.5   MCV      78 - 100 fl 90   MCH      26.5 - 33.0 pg 28.4   MCHC      31.5 - 36.5 g/dL 31.5   RDW      10.0 - 15.0 % 14.7   Platelet Count      150 - 450 10e9/L 228   Diff Method       Automated Method   % Neutrophils      % 75.1   % Lymphocytes      % 8.7   % Monocytes      % 11.7   % Eosinophils      % 3.7   % Basophils      % 0.5   % Immature Granulocytes      % 0.3   Nucleated RBCs      0 /100 0   Absolute Neutrophil      1.6 - 8.3 10e9/L 4.5   Absolute Lymphocytes      0.8 - 5.3 10e9/L 0.5 (L)   Absolute Monocytes      0.0 - 1.3 10e9/L 0.7   Absolute Eosinophils      0.0 - 0.7 10e9/L 0.2   Absolute Basophils      0.0 - 0.2 10e9/L 0.0   Abs Immature Granulocytes      0 - 0.4 10e9/L 0.0   Absolute Nucleated RBC       0.0   Sodium      133 - 144 mmol/L 142   Potassium      3.4 - 5.3 mmol/L 3.4   Chloride      94 - 109 mmol/L 111 (H)   Carbon Dioxide      20 - 32 mmol/L 26   Anion Gap      3 - 14 mmol/L 5   Glucose      70 - 99 mg/dL 105 (H)   Urea Nitrogen      7 - 30 mg/dL 26   Creatinine      0.52 - 1.04 mg/dL 1.35 (H)   GFR Estimate      >60  mL/min/1.73:m2 40 (L)   GFR Estimate If Black      >60 mL/min/1.73:m2 47 (L)   Calcium      8.5 - 10.1 mg/dL 9.6   Bilirubin Total      0.2 - 1.3 mg/dL 0.3   Albumin      3.4 - 5.0 g/dL 3.3 (L)   Protein Total      6.8 - 8.8 g/dL 7.4   Alkaline Phosphatase      40 - 150 U/L 83   ALT      0 - 50 U/L 26   AST      0 - 45 U/L 16       Imaging:  US of the right leg 3/7/2020:  1.  Occlusive thrombus in the right common femoral, femoral and popliteal veins.    US of the right leg 8/4/2020:  1. Persistent nonocclusive deep venous thrombosis in the right common femoral vein, saphenofemoral junction, and central most great saphenous vein. Thrombus was previously occlusive. 2. Occlusive deep venous thrombosis in the right femoral and popliteal vein has cleared. Right femoral and popliteal veins are now patent and fully compressible.    Assessment:  In summary, Maribel is a 69 year old female who has a remote history of right leg DVT as a teenager, Lung cancer S/P XRT back in 2014, anal cancer S/P surgical and chemoradiation therapy in 2018, who has no evidence of recurrence of both cancers, who also has a history of Alport disease S/P renal transplant back in 2004, who developed an unprovoked right leg DVT on 3/7/2020, participates in today's scheduled telephone visit for follow up.     Maribel's remote DVT back when she was 17 years of age seems to have been provoked. She was getting tested for Alport Disease. Again details are not entirely clear. From what I gathered, she did have to undergo thrombectomy at the time.      She has had no further DVT event until 3/7/2020 where she has acute onset of right leg swelling without any provoking factors. This was an unprovoked event. Just about one month prior to her diagnosis of DVT, she was cleared by Gyn/Oncology to stop surveillance imaging monitoring as her last CT Chest/Abd/Pelvis showed no further signs of cancer recurrence in Feb 2020. Thus, this DVT event in March 2020 is  not considered to be a cancer related DVT.      Maribel also has a family history of pulmonary embolism. Her daughter has a what seems to be estrogen provoked pulmonary embolic event back when she was 30 years of age. In my review of her chart, there was some mention of Maribel having prothrombin gene mutation but I am not able to find any testing records in her chart to confirm this diagnosis. Regardless, this would not change our management of her recent unprovoked VTE event.      Diagnosis:  1. Remote history of DVT of the right leg when she was 17 years of age.  2. Unprovoked right leg DVT on 3/7/2020.   3. History of lung cancer S/P XRT back in 2014. No evidence of recurrence.  4. History of anal cancer S/P surgery and chemoradiation therapy back in 2018. No evidence of recurrence.   5. Alport disease S/P renal transplant back in 2004.   6. Possible prothrombin gene mutation.   7. Family history of pulmonary embolism. Her daughter apparently was on estrogen OCP and developed a pulmonary embolic event when she was 30 years of age (she is currently 37).   8. Renal insufficiency.   9. History of claudication currently on antiplatelet therapy (Plavix and Pletal). Plavix was stopped by this writer back in March 2020.     Plan:  She is currently on enoxaparin at twice daily dosing. She would like to transition to an oral anticoagulant agent. Fortunately her DVT of the right leg has improved since March 2020. Since her March 2020 DVT was an entirely unprovoked event, she should maintain on indefinite anticoagulation therapy. At this time, I do think it is reasonable to transition her back to an oral agent. Maribel would like to go back on apixaban as her oral anticoagulant agent option if possible. She did not qualify for patients assistant program from the  back in March 2020 because of spent down requirements and thus she was not able to afford apixaban at the time. She is not qualified for their co-pay  assistant program as she is on Medicare. However, as of now, she might be qualified for the  patient assistant program as she has met the spent down requirements. I will contact Andre Godinez [facility], our clinic's pharmacist to assist in getting her affordable apixaban. Once the patient assistant program has approved, she can start apixaban at 2.5 mg PO BID dosing as secondary pharmacological DVT/PE prophylaxis. She is instructed to start this 24 hours after her last enoxaparin dose.     I will sent her another prescription for compression stockings as she was not able to get her last prescription filled due to COVID-19 pandemic.     I will see her back in 6 months time for follow up.     Call started: 13:00  Call ended: 13:17      Tyler Tomas PA-C, MPAS  Physician Assistant  [facility].

## 2020-08-06 NOTE — PATIENT INSTRUCTIONS
Maribel,    It was nice to talk to you via a Telephone Visit today.    Below is what we have discussed during your visit:  1. Continue enoxaparin injection at this time until you have your apixaban on hand.   2. Andre Godinez, our clinic's pharmacist will contact you to determine your cost for apixaban.   3. One of our nursing staffs will call you to help you with getting the compression stockings.   4. One of our administrative assistants will call you to schedule a return visit with me in 6 months.     Thank you once again in choosing our clinic as part of your healthcare team.      Tyler Tomas PA-C, MPAS  Physician Assistant  [facility].

## 2020-08-06 NOTE — NURSING NOTE
Patient was contacted to complete the pre-visit call prior to their telephone visit with the provider.  The following statement was read:       This visit will be billed to your insurance the same as an in-person visit. Because of Coronavirus we are instituting telephone visits when possible to keep everyone safe. The telephone visit will be a call between you and the provider.  This service lets us provide the care you need with a telephone conversation.  If a prescription is necessary, we can send it directly to your pharmacy.If lab work or other testing is needed, we can help arrange a place/time for that to be done at a later date.If during the course of the call the provider feels a telephone visit is not appropriate, then your insurance company will not be billed.       Allergies and medications were reviewed and travel screening complete.     I thanked them for their time to cover this information.   Rosanna Mukherjee, MSN, RN, PHN -Nurse Clinician, [facility] [phone]

## 2020-08-07 ENCOUNTER — TELEPHONE (OUTPATIENT)
Dept: HEMATOLOGY | Facility: CLINIC | Age: 70
End: 2020-08-07

## 2020-08-07 NOTE — TELEPHONE ENCOUNTER
Maribel Yang  [id number]  1950    Left message on 8/6 & 8/7 requesting that she call back on her preference of home medical supply company, so that I can fax her script from [facility]. Provided several [employer] suppliers near her.  Rosanna Mukherjee, MSN, RN, PHN -Nurse Clinician, [facility] [phone]

## 2020-08-18 NOTE — PROGRESS NOTES
"Maribel Yang is a 69 year old female who is being evaluated via a billable video visit.      The patient has been notified of following:     \"This video visit will be conducted via a call between you and your physician/provider. We have found that certain health care needs can be provided without the need for an in-person physical exam.  This service lets us provide the care you need with a video conversation.  If a prescription is necessary we can send it directly to your pharmacy.  If lab work is needed we can place an order for that and you can then stop by our lab to have the test done at a later time.    Video visits are billed at different rates depending on your insurance coverage.  Please reach out to your insurance provider with any questions.    If during the course of the call the physician/provider feels a video visit is not appropriate, you will not be charged for this service.\"    Patient has given verbal consent for Video visit? Yes  How would you like to obtain your AVS? MyChart  If you are dropped from the video visit, the video invite should be resent to: Text to cell phone: [phone]  Will anyone else be joining your video visit? Sara Roa HENOK    [facility]    Hematology/Oncology Established Patient Note  Aug 19, 2020      Reason for Follow-up: anal canal carcinoma      HISTORY OF PRESENT ILLNESS: Maribel Yang is a 69 year old female with PMHx of kidney transplant in 2004, lung cancer in 2014 (SCC of the RUL, stage [id number] (IA) s/p SBRT by Dr. Mckeon), HPV, PAD, who presents with anal canal carcinoma.   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 Renteria.  She has undergone high resolution anoscopy with biopsy, which showed superficially invasive squamous cell carcinoma.  On 3/14/18, she underwent exam under anesthesia with full thickness excision of superficially invasive " anal cancer by Dr. Leo.  Pathology showed poorly differentiated invasive squamous cell carcinoma, tumor size 7 mm, tumor invades into anal sphincter muscle, resection margins negative for carcinoma and high-grade dysplasia.  Invasive tumor is 1 mm from closest margin.  There is background high grade squamous intraepithelial lesion (AIN 3).  It was staged pT1.  She has MRI pelvis on 2/28/18 that showed no pelvic or inguinal lymphadenopathy.    Patient was discussed at tumor conference, and consensus was that no further surgery was feasible, and recommendation is for chemoradiation, but with Xeloda without mitomycin, considering her co-morbidities and history of renal transplant on immunosuppression.    She started chemoradiation on 4/30/18 and completed it on 6/11/18. She presents in routine surveillance.       INTERIM HISTORY: Maribel is feeling well today. She is not sure why this visit was scheduled. No health updates besides she was diagnosed with a blood clot in her leg. She is now on apixaban and has improvement in pain and swelling. No SOB. No URI sxs or fevers/chills. Has had diarrhea chronically for the last 6 years. Has to be careful about what she eats. She started a probiotic a while ago and this has greatly improved diarrhea. Now only has this once per week. Appetite has not fully come back after treatment. She has not been able to regain weight. No urinary concerns. No vaginal dryness. Energy level is lower since treatment. It is at about  50% of baseline.     She has follow-up scheduled with CRS and GYN teams next month. Many appointments had been cancelled with COVID.       REVIEW OF SYSTEMS:   14 point ROS was reviewed and is negative other than as noted above in HPI.       HOME MEDICATIONS:  Current Outpatient Medications   Medication Sig Dispense Refill     ACETAMINOPHEN PO Take 1-2 tablets by mouth every 8 hours as needed for pain       amoxicillin (AMOXIL) 500 MG capsule Take 4 capsules (2,000  mg) by mouth once as needed Prior to dental procedures 16 capsule 3     atorvastatin (LIPITOR) 20 MG tablet TAKE ONE TABLET BY MOUTH EVERY DAY 90 tablet 1     calcium carbonate 600 mg-vitamin D 400 units (CALTRATE) 600-400 MG-UNIT per tablet Take 1 tablet by mouth 2 times daily       calcium citrate (CITRACAL) 950 MG tablet Take 1 tablet (950 mg) by mouth 2 times daily (Patient not taking: Reported on 8/6/2020) 60 tablet 3     cilostazol (PLETAL) 100 MG tablet TAKE ONE TABLET BY MOUTH TWICE A DAY 60 tablet 6     COMPRESSION STOCKINGS Wear compression stockings on the right leg or both legs most time during the day and take them off at night. 2 each 2     enoxaparin ANTICOAGULANT (LOVENOX) 60 MG/0.6ML syringe Inject 0.5 mLs (50 mg) Subcutaneous every 24 hours 30 Syringe 1     Lactobacillus-Inulin ([facility] DIGESTIVE [facility]) CAPS TAKE ONE CAPSULE BY MOUTH EVERY DAY 90 capsule 3     losartan (COZAAR) 50 MG tablet Take 1 tablet (50 mg) by mouth daily 90 tablet 1     metoprolol tartrate (LOPRESSOR) 100 MG tablet TAKE ONE TABLET BY MOUTH TWICE A  tablet 0     NIFEdipine ER OSMOTIC (PROCARDIA XL) 90 MG 24 hr tablet TAKE ONE TABLET BY MOUTH EVERY DAY 30 tablet 6     predniSONE (DELTASONE) 5 MG tablet TAKE ONE TABLET BY MOUTH EVERY DAY 90 tablet 3     sirolimus (GENERIC EQUIVALENT) 1 MG tablet Take 2 tablets (2 mg) by mouth daily (Patient not taking: Reported on 5/29/2020) 6 tablet 0     sirolimus (GENERIC EQUIVALENT) 1 MG tablet Take 2 tablets (2 mg) by mouth daily 180 tablet 3         ALLERGIES:  Allergies   Allergen Reactions     Ultracet Nausea and Vomiting and Hives     Fentanyl Nausea     Has had since she initially had the issues with nausea and tolerated it OK.      Hydrocodone Nausea and Vomiting and Hives         Objective:    Video physical exam  General: Patient appears well in no acute distress. Thin body habitus.  Skin: No visualized rash or lesions on visualized skin  Eyes: EOMI, no erythema, sclera  icterus or discharge noted  Resp: Appears to be breathing comfortably without accessory muscle usage, speaking in full sentences, no cough  MSK: Appears to have normal range of motion based on visualized movements  Neurologic: No apparent tremors, facial movements symmetric  Psych: affect and mood congruent, alert and oriented    The rest of a comprehensive physical examination is deferred due to PHE (public health emergency) video restrictions        ASSESSMENT/PLAN:  Maribel Yang is a 69 year old female with:    1) Anal canal carcinoma: history of HPV; [id number]. S/p excional biopsy on 3/14/18, with pathology showing poorly differentiated invasive squamous cell carcinoma, tumor size 7 mm, tumor invades into anal sphincter muscle, resection margins negative for carcinoma and high-grade dysplasia however Invasive tumor is 1 mm from closest margin. It was recommended to proceed with chemoradiation with Xeloda which she completed 6/2018.      She has been followed via surveillance since then. She saw Dr. Rene in February with a CT CAP at that time showing no evidence of disease.     Per NCCN guidelines, even with T1 disease, she should have ITZEL every 3-6 months for 5 years, inguinal node palpation every 3-6 months for 5 years, anoscopy every 6-12 months for 3 years, and imaging annually for 3 years.     She was given a treatment plan summary detailing her treatment and long term effects of treatment.     -Will schedule next/last CT c/a/p will be in February 2021  -Overdue for anoscopy and exam with Dr. Leo--is scheduled with CRS next month.   -Follow-up with medical oncology/survivorship. She can be transitioned to survivorship or PCP after that last appointment.     2) Risk of long-term radiation side effects:   -Chronic bowel dysmotility including diarrhea, clustering, urgency, frequency, and incontinence. Antidiarrheals, fiber, and pelvic floor therapy should be considered.   -Urogenital dysfunction  including vaginal dryness, urinary urgency, frequency, or incontinence. Uro-gyn referral if urinary symptoms are present otherwise Replens for vaginal dryness.   -Risk of pelvic fractures/loss of bone density from pelvic XRT. Consider DEXA scans through PCP.     3) History of lung cancer in 2014: SCC of the RUL, stage [id number] ([place]) s/p SBRT.   -Last chest imaging 2/2020 showed stable RUL consolidation likely post-XRT changes.   -NCCN guidelines recommend annual low-dose non-contrast chest CT now that she is 6 years out. This could be ordered through PCP or survivorship after last surveillance anal cancer scan next February.     4) Alport's disease s/p renal transplant in 2004: followed by Dr. See.    -on immunosuppression, as per nephrology    5) MGUS: Had serum light chains and SPEP checked 2/2020. Should be followed annually.     6) Cervical and vaginal dysplasia:underwent CO2 laser ablation procedure on 9/25/18 by Dr. Garcia. Should have regular PAPs/exam through GYN or PCP. Colposcopy scheduled next month.     7) Fatigue: Will refer her to cancer rehab. If continues consider testing TSH (last done 6/2019 WNL), vitamin D, and Hgb (13 2/2020 labs).     8) Routine health maintenance/wellness guidelines:   -Continue to undergo regular preventative health screening, cancer screening, and immunizations with PCP.   -Maintain a healthy body weight throughout life; encouraged her to try plant based protein powder to help gain weight.   -Adhere to a healthy diet which is plant-based.   -Exercise regularly, ideally 30 minutes of moderate intensity exercise most days of the week.   -Limit alcohol consumption. No more than 1 drink per day for women.   -Encouraged her to stop smoking completely.       Greater than 40 minutes was spent with this patient with greater than 20 minutes spent in counseling and coordination of care.      Video-Visit Details    Type of service:  Video Visit    Video Start Time: 4:20 pm   Video End  Time: 4:44 pm     Originating Location (pt. Location): Home    Distant Location (provider location):  [facility]     Platform used for Video Visit: Mikaela Ribeiro PA-C

## 2020-08-19 ENCOUNTER — VIRTUAL VISIT (OUTPATIENT)
Dept: ONCOLOGY | Facility: CLINIC | Age: 70
End: 2020-08-19
Attending: INTERNAL MEDICINE
Payer: COMMERCIAL

## 2020-08-19 DIAGNOSIS — C21.1 MALIGNANT NEOPLASM OF ANAL CANAL (H): Primary | ICD-10-CM

## 2020-08-19 DIAGNOSIS — D47.2 MGUS (MONOCLONAL GAMMOPATHY OF UNKNOWN SIGNIFICANCE): ICD-10-CM

## 2020-08-19 PROCEDURE — 99214 OFFICE O/P EST MOD 30 MIN: CPT | Mod: 95 | Performed by: PHYSICIAN ASSISTANT

## 2020-08-19 PROCEDURE — 40001009 ZZH VIDEO/TELEPHONE VISIT; NO CHARGE

## 2020-08-19 NOTE — LETTER
"    8/19/2020         RE: Maribel Yang  [address]  [facility] [address]        Dear Colleague,    Thank you for referring your patient, Maribel Yang, to the [facility]. Please see a copy of my visit note below.    Maribel Yang is a 69 year old female who is being evaluated via a billable video visit.      The patient has been notified of following:     \"This video visit will be conducted via a call between you and your physician/provider. We have found that certain health care needs can be provided without the need for an in-person physical exam.  This service lets us provide the care you need with a video conversation.  If a prescription is necessary we can send it directly to your pharmacy.  If lab work is needed we can place an order for that and you can then stop by our lab to have the test done at a later time.    Video visits are billed at different rates depending on your insurance coverage.  Please reach out to your insurance provider with any questions.    If during the course of the call the physician/provider feels a video visit is not appropriate, you will not be charged for this service.\"    Patient has given verbal consent for Video visit? Yes  How would you like to obtain your AVS? MyChart  If you are dropped from the video visit, the video invite should be resent to: Text to cell phone: [phone]  Will anyone else be joining your video visit? Sara Roa HENOK    [facility]    Hematology/Oncology Established Patient Note  Aug 19, 2020      Reason for Follow-up: anal canal carcinoma      HISTORY OF PRESENT ILLNESS: Maribel Yang is a 69 year old female with PMHx of kidney transplant in 2004, lung cancer in 2014 (SCC of the RUL, stage [id number] (IA) s/p SBRT by Dr. Mckeon), HPV, PAD, who presents with anal canal carcinoma.   She has history of cervical dysplasia, anorectal condyloma and vulvar intraepithelial " neoplasia 2, followed by Dr. Renteria.  She has undergone high resolution anoscopy with biopsy, which showed superficially invasive squamous cell carcinoma.  On 3/14/18, she underwent exam under anesthesia with full thickness excision of superficially invasive anal cancer by Dr. Leo.  Pathology showed poorly differentiated invasive squamous cell carcinoma, tumor size 7 mm, tumor invades into anal sphincter muscle, resection margins negative for carcinoma and high-grade dysplasia.  Invasive tumor is 1 mm from closest margin.  There is background high grade squamous intraepithelial lesion (AIN 3).  It was staged pT1.  She has MRI pelvis on 2/28/18 that showed no pelvic or inguinal lymphadenopathy.    Patient was discussed at tumor conference, and consensus was that no further surgery was feasible, and recommendation is for chemoradiation, but with Xeloda without mitomycin, considering her co-morbidities and history of renal transplant on immunosuppression.    She started chemoradiation on 4/30/18 and completed it on 6/11/18. She presents in routine surveillance.       INTERIM HISTORY: Maribel is feeling well today. She is not sure why this visit was scheduled. No health updates besides she was diagnosed with a blood clot in her leg. She is now on apixaban and has improvement in pain and swelling. No SOB. No URI sxs or fevers/chills. Has had diarrhea chronically for the last 6 years. Has to be careful about what she eats. She started a probiotic a while ago and this has greatly improved diarrhea. Now only has this once per week. Appetite has not fully come back after treatment. She has not been able to regain weight. No urinary concerns. No vaginal dryness. Energy level is lower since treatment. It is at about  50% of baseline.     She has follow-up scheduled with CRS and GYN teams next month. Many appointments had been cancelled with COVID.       REVIEW OF SYSTEMS:   14 point ROS was reviewed and is negative other  than as noted above in HPI.       HOME MEDICATIONS:  Current Outpatient Medications   Medication Sig Dispense Refill     ACETAMINOPHEN PO Take 1-2 tablets by mouth every 8 hours as needed for pain       amoxicillin (AMOXIL) 500 MG capsule Take 4 capsules (2,000 mg) by mouth once as needed Prior to dental procedures 16 capsule 3     atorvastatin (LIPITOR) 20 MG tablet TAKE ONE TABLET BY MOUTH EVERY DAY 90 tablet 1     calcium carbonate 600 mg-vitamin D 400 units (CALTRATE) 600-400 MG-UNIT per tablet Take 1 tablet by mouth 2 times daily       calcium citrate (CITRACAL) 950 MG tablet Take 1 tablet (950 mg) by mouth 2 times daily (Patient not taking: Reported on 8/6/2020) 60 tablet 3     cilostazol (PLETAL) 100 MG tablet TAKE ONE TABLET BY MOUTH TWICE A DAY 60 tablet 6     COMPRESSION STOCKINGS Wear compression stockings on the right leg or both legs most time during the day and take them off at night. 2 each 2     enoxaparin ANTICOAGULANT (LOVENOX) 60 MG/0.6ML syringe Inject 0.5 mLs (50 mg) Subcutaneous every 24 hours 30 Syringe 1     Lactobacillus-Inulin ([facility] DIGESTIVE HEALTH) CAPS TAKE ONE CAPSULE BY MOUTH EVERY DAY 90 capsule 3     losartan (COZAAR) 50 MG tablet Take 1 tablet (50 mg) by mouth daily 90 tablet 1     metoprolol tartrate (LOPRESSOR) 100 MG tablet TAKE ONE TABLET BY MOUTH TWICE A  tablet 0     NIFEdipine ER OSMOTIC (PROCARDIA XL) 90 MG 24 hr tablet TAKE ONE TABLET BY MOUTH EVERY DAY 30 tablet 6     predniSONE (DELTASONE) 5 MG tablet TAKE ONE TABLET BY MOUTH EVERY DAY 90 tablet 3     sirolimus (GENERIC EQUIVALENT) 1 MG tablet Take 2 tablets (2 mg) by mouth daily (Patient not taking: Reported on 5/29/2020) 6 tablet 0     sirolimus (GENERIC EQUIVALENT) 1 MG tablet Take 2 tablets (2 mg) by mouth daily 180 tablet 3         ALLERGIES:  Allergies   Allergen Reactions     Ultracet Nausea and Vomiting and Hives     Fentanyl Nausea     Has had since she initially had the issues with nausea and  tolerated it OK.      Hydrocodone Nausea and Vomiting and Hives         Objective:    Video physical exam  General: Patient appears well in no acute distress. Thin body habitus.  Skin: No visualized rash or lesions on visualized skin  Eyes: EOMI, no erythema, sclera icterus or discharge noted  Resp: Appears to be breathing comfortably without accessory muscle usage, speaking in full sentences, no cough  MSK: Appears to have normal range of motion based on visualized movements  Neurologic: No apparent tremors, facial movements symmetric  Psych: affect and mood congruent, alert and oriented    The rest of a comprehensive physical examination is deferred due to PHE (public health emergency) video restrictions        ASSESSMENT/PLAN:  Maribel Yang is a 69 year old female with:    1) Anal canal carcinoma: history of HPV; [id number]. S/p excional biopsy on 3/14/18, with pathology showing poorly differentiated invasive squamous cell carcinoma, tumor size 7 mm, tumor invades into anal sphincter muscle, resection margins negative for carcinoma and high-grade dysplasia however Invasive tumor is 1 mm from closest margin. It was recommended to proceed with chemoradiation with Xeloda which she completed 6/2018.      She has been followed via surveillance since then. She saw Dr. Rene in February with a CT CAP at that time showing no evidence of disease.     Per NCCN guidelines, even with T1 disease, she should have ITZEL every 3-6 months for 5 years, inguinal node palpation every 3-6 months for 5 years, anoscopy every 6-12 months for 3 years, and imaging annually for 3 years.     She was given a treatment plan summary detailing her treatment and long term effects of treatment.     -Will schedule next/last CT c/a/p will be in February 2021  -Overdue for anoscopy and exam with Dr. Leo--is scheduled with CRS next month.   -Follow-up with medical oncology/survivorship. She can be transitioned to survivorship or PCP after that  last appointment.     2) Risk of long-term radiation side effects:   -Chronic bowel dysmotility including diarrhea, clustering, urgency, frequency, and incontinence. Antidiarrheals, fiber, and pelvic floor therapy should be considered.   -Urogenital dysfunction including vaginal dryness, urinary urgency, frequency, or incontinence. Uro-gyn referral if urinary symptoms are present otherwise Replens for vaginal dryness.   -Risk of pelvic fractures/loss of bone density from pelvic XRT. Consider DEXA scans through PCP.     3) History of lung cancer in 2014: SCC of the RUL, stage [id number] (IA) s/p SBRT.   -Last chest imaging 2/2020 showed stable RUL consolidation likely post-XRT changes.   -NCCN guidelines recommend annual low-dose non-contrast chest CT now that she is 6 years out. This could be ordered through PCP or survivorship after last surveillance anal cancer scan next February.     4) Alport's disease s/p renal transplant in 2004: followed by Dr. See.    -on immunosuppression, as per nephrology    5) MGUS: Had serum light chains and SPEP checked 2/2020. Should be followed annually.     6) Cervical and vaginal dysplasia:underwent CO2 laser ablation procedure on 9/25/18 by Dr. Garcia. Should have regular PAPs/exam through GYN or PCP. Colposcopy scheduled next month.     7) Fatigue: Will refer her to cancer rehab. If continues consider testing TSH (last done 6/2019 WNL), vitamin D, and Hgb (13 2/2020 labs).     8) Routine health maintenance/wellness guidelines:   -Continue to undergo regular preventative health screening, cancer screening, and immunizations with PCP.   -Maintain a healthy body weight throughout life; encouraged her to try plant based protein powder to help gain weight.   -Adhere to a healthy diet which is plant-based.   -Exercise regularly, ideally 30 minutes of moderate intensity exercise most days of the week.   -Limit alcohol consumption. No more than 1 drink per day for women.   -Encouraged  her to stop smoking completely.       Greater than 40 minutes was spent with this patient with greater than 20 minutes spent in counseling and coordination of care.      Video-Visit Details    Type of service:  Video Visit    Video Start Time: 4:20 pm   Video End Time: 4:44 pm     Originating Location (pt. Location): Home    Distant Location (provider location):  [facility] CANCER [facility]     Platform used for Video Visit: Mikaela Ribeiro PA-C          Again, thank you for allowing me to participate in the care of your patient.        Sincerely,        Angelica Ribeiro PA-C

## 2020-09-02 ENCOUNTER — OFFICE VISIT (OUTPATIENT)
Dept: SURGERY | Facility: CLINIC | Age: 70
End: 2020-09-02
Payer: COMMERCIAL

## 2020-09-02 VITALS
HEIGHT: 66 IN | TEMPERATURE: 99.3 F | WEIGHT: 92.7 LBS | DIASTOLIC BLOOD PRESSURE: 79 MMHG | SYSTOLIC BLOOD PRESSURE: 125 MMHG | HEART RATE: 75 BPM | OXYGEN SATURATION: 96 % | BODY MASS INDEX: 14.9 KG/M2

## 2020-09-02 DIAGNOSIS — C21.1 MALIGNANT NEOPLASM OF ANAL CANAL (H): Primary | ICD-10-CM

## 2020-09-02 ASSESSMENT — PAIN SCALES - GENERAL: PAINLEVEL: WORST PAIN (10)

## 2020-09-02 ASSESSMENT — MIFFLIN-ST. JEOR: SCORE: 972.23

## 2020-09-02 NOTE — LETTER
"2020     RE: Maribel Yang  [address]  [facility] [address]     Dear Colleague,    Thank you for referring your patient, Maribel Yang, to the [facility] COLON AND RECTAL SURGERY at [facility]. Please see a copy of my visit note below.    Colon and Rectal Surgery Follow-Up Clinic Note    RE: Maribel Yang  : 1950  JESSEE: 2020    Maribel is a 69 year old female who presents today for follow up of her anal cancer.     HPI: Maribel has a past medical history of kidney transplant in , lung cancer in , cervical dysplasia, ananorectal condyloma and vulvar intraepithelial neoplasia 2. Dr. Db Urbina excised a patch if high grade perianal dysplasia in the right anterolateral position in . Dr. Leo performed full-thickness excision of superficially invasive left lateral anal canal cancer on 3/14/2018 with pathology showing poorly differentiated invasive squamous cell carcinoma invading into the anal sphincter muscle with negative margins but with closest margin 1 mm. MRI without pelvic or inguinal lymphadenopathy. She completed chemoradiation on 18 and presents today or follow up.  CT CAP 20 and MRI Pelvis on 8/10/2018 without any evidence of recurrence.  She developed an occlusive thrombus in the right common femoral, femoral and popliteal veins in March.  She has been following with oncology with planned imaging again in February.     Interval history: Maribel has been doing well. No pain or rectal bleeding. Some loose stools continued but these are now only about once a weeks with imodium and a probiotic.     Physical Examination: Exam was chaperoned by Taylor Ng, EMT   /79 (BP Location: Left arm, Patient Position: Sitting, Cuff Size: Adult Regular)   Pulse 75   Temp 99.3  F (37.4  C) (Oral)   Ht 5' 6\"   Wt 92 lb 11.2 oz   SpO2 96%   BMI 14.96 kg/m      Alert, oriented, in no acute distress, sitting " comfortably. No palpable inguinal lymph nodes but some palpable nodularity higer up in her groin at prior scar site. Perianal skin intact with small skin tags present. No visible or palpable lesions. ITZEL with some palpable scarring in the left anterior position. Anoscopy with small telangectasia and scarring present but no lesions.  There is a well healed right anterior perianal scar measuring ~10 mm in diameter with no visible recurrent dysplasia or cancer.    Assessment/Plan: No evidence of recurrence on exam today. Repeat anoscopy in 4 months. Continue to follow with oncology with Dr. Tucker and she is scheduled in February with CT. Encouraged the patient to contact the clinic in the meantime with any questions or concerns. Patient's questions were answered to her stated satisfaction and she is in agreement with this plan.    Medical history:  Past Medical History:   Diagnosis Date     Abnormal coagulation profile     p [id number] heterozygote      Age-related osteoporosis without current pathological fracture 6/22/2019     Anemia      Antiplatelet or antithrombotic long-term use      ASCUS with positive high risk HPV 2007, 2015    + HPV 56, 54,& 6, colp - TAL III, Leep =TAL II     Basal cell carcinoma      Hypertension      Immunosuppressed status (H)     due meds     Kidney replaced by transplant 9/04    Living donor recipient,  Rejection 7/2005     LSIL (low grade squamous intraepithelial lesion) on Pap smear 4/2013    +HPV 33 or 45, 61       PAD (peripheral artery disease) (H)      PONV (postoperative nausea and vomiting)      Squamous cell lung cancer (H)      Thrombosis of leg 1967     Unspecified disorder of kidney and ureter     X-linked dominant Alport's syndrome.       Surgical history:  Past Surgical History:   Procedure Laterality Date     BIOPSY ANAL N/A 3/14/2018    Procedure: BIOPSY ANAL;;  Surgeon: Shabbir Leo MD;  Location: [facility] OR     C NONSPECIFIC PROCEDURE      Thrombectomy     C NONSPECIFIC  PROCEDURE  1955 and 1959    Bilater eye surgery - correction for crossed eyes     C NONSPECIFIC PROCEDURE  1998    oopherectomy L     C NONSPECIFIC PROCEDURE  1967    open kidney biopsy - L     C TRANSPLANTATION OF KIDNEY  9/04    recipient -- done at [facility]     COLONOSCOPY       COLONOSCOPY N/A 8/9/2017    Procedure: COMBINED COLONOSCOPY, SINGLE OR MULTIPLE BIOPSY/POLYPECTOMY BY BIOPSY;;  Surgeon: Sushil Hyatt MD;  Location: [facility] GI     COLPOSCOPY,LOOP ELECTRD CERVIX EXCIS  03/11/08    TAL II     CONIZATION LEEP  7/17/2013    Procedure: CONIZATION LEEP;;  Surgeon: Liliana Renteria MD;  Location: [facility] OR     CONIZATION LEEP N/A 8/17/2016    Procedure: CONIZATION LEEP;  Surgeon: Liliana Renteria MD;  Location: [facility] OR     EXAM UNDER ANESTHESIA ANUS  7/15/2014    Procedure: EXAM UNDER ANESTHESIA ANUS;  Surgeon: Radha Musa MD;  Location: [facility] OR     EXAM UNDER ANESTHESIA ANUS N/A 3/14/2018    Procedure: EXAM UNDER ANESTHESIA ANUS;  Anal Exam Under Anesthesia With Excision of anal lesion, proctoscopy;  Surgeon: Shabbir Leo MD;  Location: [facility] OR     EYE SURGERY       LASER CO2 EXCISE VULVA WIDE LOCAL  7/15/2014    Procedure: LASER CO2 EXCISE VULVA WIDE LOCAL;  Surgeon: Liliana Renteria MD;  Location: [facility] OR     LASER CO2 VAGINA  7/17/2013    Procedure: LASER CO2 VAGINA;;  Surgeon: Liliana Renteria MD;  [facility]: [facility] OR     LASER CO2 VAGINA N/A 9/25/2018    Procedure: LASER CO2 VAGINA;  Exam Under Anesthesia, CO2 Laser Ablation of Upper Vagina and Cervix;  Surgeon: Pati Garcia MD;  Location: [facility] OR     MICROSCOPY ANAL  7/17/2013    Procedure: MICROSCOPY ANAL;  Anal Microscopy,  EUA vagina,Colposcopy Of Vagina And Vulva, Vaginal Biopsies, Omniguide Co2 Laser To Vagina and vulva, Loop Electrosurgical Excision Procedure To Cervix;  Surgeon: Radha Musa MD;  Location: [facility] OR     MICROSCOPY ANAL  7/15/2014    Procedure: MICROSCOPY ANAL;  Surgeon: Radha Musa MD;   Location: [facility] OR       Problem list:  Patient Active Problem List    Diagnosis Date Noted     Acute deep vein thrombosis (DVT) of proximal vein of lower extremity, unspecified laterality (H) 05/29/2020     Priority: Medium     Dx 3/2020  Rx for Eliquis but financial issues currently so on Lovenox.  Plan to restart when she can afford  Stopped the Plavix but continuing her Pletal       Age-related osteoporosis without current pathological fracture 06/22/2019     Priority: Medium     Aftercare following organ transplant 05/05/2018     Priority: Medium     Malignant neoplasm of anal canal (H) 04/09/2018     Priority: Medium     Cherry angioma 06/12/2016     Priority: Medium     Skin cancer screening 06/12/2016     Priority: Medium     Intertrigo 06/12/2016     Priority: Medium     History of basal cell carcinoma 06/12/2016     Priority: Medium     Alopecia 10/27/2015     Priority: Medium     Vaginal dysplasia 04/02/2015     Priority: Medium     Lumbago 10/16/2014     Priority: Medium     Squamous cell lung cancer (H)      Priority: Medium     Following with oncology   Had radiation therapy - 3 sessions  F/u CT completed  PET scheduled 6/16/14       Peripheral artery disease (H) 01/16/2014     Priority: Medium     YUNIOR III (vulvar intraepithelial neoplasia III) 09/03/2013     Priority: Medium     History of basal cell carcinoma 05/24/2013     Priority: Medium     Immunosuppression (H)      Priority: Medium     HTN, kidney transplant related      Priority: Medium     CARDIOVASCULAR SCREENING; LDL GOAL LESS THAN 100 10/31/2010     Priority: Medium     S/P LEEP of cervix 03/11/2008     Priority: Medium     1/07: + HPV 6 Low risk  10/07: ASCUS, + HPV 56, 54 & 6. 12/07: Runnells: TAL II  3/11/08: LEEP - TAL II  8/08: ASCUS, ECC atypia, +HPV 82  9/08: Runnells - Atypia  5/09: NIL pap, 11/09: NIL pap, neg HPV  4/13: LSIL, + HPV 33 or 45, 61. 5/15/13: Runnells - VAIN II & III,TAL II. Referred to gyn onc.   6/20/13: Runnells with gyn onc. Plan  LEEP and CO2 laser to vagina, cx and vaginal vault. Reminder done  7/17/13: Anal Microscopy, EUA vagina,Colposcopy Of Vagina And Vulva, Vaginal Biopsies, Omniguide Co2 Laser To Vagina and vulva, Loop Electrosurgical Excision Procedure To Cervix- YUNIOR 1,2 & 3: AIN 2, VAIN 2, Leep bx benign Plan colp at gyn onc in 4 months. Reminder sent.  12/2/13: ASC H, + HPV 33/45, HSIL anal pap, YUNIOR 1 & 2, AIN 1 & 3. F/u deferred due to lung ca radiation  5/8/14: ASCUS, + HPV 33/45. 5/22/14: consult with Dr. Renteria, surgery planned in reminders  7/15/14: surgery-Exam under anesthesia, vulva and vaginal colposcopy, vulvar biopsy, CO2 laser of the vulva, vaginal pap smear - Pap LSIL, + HPV 33/45. Vulvar bx HSIL Plan repeat colp and pap with NP at gyn onc.  9/17/15: Mendon with gyn onc. Due for repeat colp 3 -4 months. Tracking started.  5/26/16: Pap with vaginal Bx--Atyp/YUNIOR 1. Plan: Endometrial Bx  6/28/16: EMB, ECC--TAL 3. Plan: LEEP  8/17/16: LEEP--TAL 1, YUNIOR 1. Plan: Pap and colposcopy 6mo, due 2/17/17.  06/01/17 Pap--NIL/+HR HPV. Mendon--negative. Plan: repeat colp and pap in 6mo.  12/14/17 Mendon--visually normal, no Bx. Plan: colp & pap in 6mo.       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     Priority: Medium     Medications:  Current Outpatient Medications   Medication Sig Dispense Refill     ACETAMINOPHEN PO Take 1-2 tablets by mouth every 8 hours as needed for pain       amoxicillin (AMOXIL) 500 MG capsule Take 4 capsules (2,000 mg) by mouth once as needed Prior to dental procedures 16 capsule 3     atorvastatin (LIPITOR) 20 MG tablet TAKE ONE TABLET BY MOUTH EVERY DAY 90 tablet 1     calcium carbonate 600 mg-vitamin D 400 units (CALTRATE) 600-400 MG-UNIT per tablet Take 1 tablet by mouth 2 times daily       calcium citrate (CITRACAL) 950 MG tablet Take 1 tablet (950 mg) by mouth 2 times daily 60 tablet 3      cilostazol (PLETAL) 100 MG tablet TAKE ONE TABLET BY MOUTH TWICE A DAY 60 tablet 6     enoxaparin ANTICOAGULANT (LOVENOX) 60 MG/0.6ML syringe Inject 0.5 mLs (50 mg) Subcutaneous every 24 hours 30 Syringe 1     Lactobacillus-Inulin ([facility] DIGESTIVE [facility]) CAPS TAKE ONE CAPSULE BY MOUTH EVERY DAY 90 capsule 3     losartan (COZAAR) 50 MG tablet Take 1 tablet (50 mg) by mouth daily 90 tablet 1     metoprolol tartrate (LOPRESSOR) 100 MG tablet TAKE ONE TABLET BY MOUTH TWICE A  tablet 0     NIFEdipine ER OSMOTIC (PROCARDIA XL) 90 MG 24 hr tablet TAKE ONE TABLET BY MOUTH EVERY DAY 30 tablet 6     predniSONE (DELTASONE) 5 MG tablet TAKE ONE TABLET BY MOUTH EVERY DAY 90 tablet 3     sirolimus (GENERIC EQUIVALENT) 1 MG tablet Take 2 tablets (2 mg) by mouth daily 6 tablet 0     sirolimus (GENERIC EQUIVALENT) 1 MG tablet Take 2 tablets (2 mg) by mouth daily 180 tablet 3     COMPRESSION STOCKINGS Wear compression stockings on the right leg or both legs most time during the day and take them off at night. (Patient not taking: Reported on 9/2/2020) 2 each 2       Allergies:  Allergies   Allergen Reactions     Ultracet Nausea and Vomiting and Hives     Fentanyl Nausea     Has had since she initially had the issues with nausea and tolerated it OK.      Hydrocodone Nausea and Vomiting and Hives       Family history:  Family History   Problem Relation Age of Onset     Diabetes Father         type 2 diag age,60's     Alcohol/Drug Father      Arthritis Father      Hypertension Father      Lipids Father         high cholesterol     Arthritis Mother      Diabetes Mother      Depression Mother      Heart Disease Mother      Neurologic Disorder Mother      Obesity Mother      Psychotic Disorder Mother      Thyroid Disease Mother      Gynecology Sister         Precancerous cell removal from cervix at age 45     Depression Sister      Allergies Sister      Alcohol/Drug Sister      Neurologic Disorder Sister      Cerebrovascular  "Disease Paternal Grandmother          of a stroke in her 80's     Diabetes Paternal Grandmother      Alcohol/Drug Son      Colon Polyps Sister      Colon Cancer No family hx of      Crohn's Disease No family hx of      Ulcerative Colitis No family hx of      Melanoma No family hx of      Skin Cancer No family hx of        Social history:  Social History     Tobacco Use     Smoking status: Current Some Day Smoker     Packs/day: 0.30     Years: 35.00     Pack years: 10.50     Types: Cigarettes     Start date: 1967     Smokeless tobacco: Never Used     Tobacco comment: Couple times a week. 1-2 cigs 1-2x a week.   Substance Use Topics     Alcohol use: Yes     Alcohol/week: 0.0 standard drinks     Frequency: Monthly or less     Drinks per session: 1 or 2     Binge frequency: Less than monthly     Comment: rarely     Marital status: .    Nursing Notes:   Taylor Shepherd EMT  2020  2:03 PM  Signed  Chief Complaint   Patient presents with     RECHECK     Four month re-check anal cancer.       Vitals:    20 1345   BP: 125/79   BP Location: Left arm   Patient Position: Sitting   Cuff Size: Adult Regular   Pulse: 75   Temp: 99.3  F (37.4  C)   TempSrc: Oral   SpO2: 96%   Weight: 92 lb 11.2 oz   Height: 5' 6\"       Body mass index is 14.96 kg/m .  RODERICK Love                   Total face to face time was 15 minutes, >50% counseling.    Vianney Worthy, NP-C  Colon and Rectal Surgery  [facility]    "

## 2020-09-02 NOTE — NURSING NOTE
"Chief Complaint   Patient presents with     RECHECK     Four month re-check anal cancer.       Vitals:    09/02/20 1345   BP: 125/79   BP Location: Left arm   Patient Position: Sitting   Cuff Size: Adult Regular   Pulse: 75   Temp: 99.3  F (37.4  C)   TempSrc: Oral   SpO2: 96%   Weight: 92 lb 11.2 oz   Height: 5' 6\"       Body mass index is 14.96 kg/m .      Taylor Ng, EMT                      "

## 2020-09-02 NOTE — PROGRESS NOTES
"Colon and Rectal Surgery Follow-Up Clinic Note    RE: Maribel Yang  : 1950  JESSEE: 2020    Maribel is a 69 year old female who presents today for follow up of her anal cancer.     HPI: Maribel has a past medical history of kidney transplant in , lung cancer in , cervical dysplasia, ananorectal condyloma and vulvar intraepithelial neoplasia 2. Dr. Db Urbina excised a patch if high grade perianal dysplasia in the right anterolateral position in . Dr. Leo performed full-thickness excision of superficially invasive left lateral anal canal cancer on 3/14/2018 with pathology showing poorly differentiated invasive squamous cell carcinoma invading into the anal sphincter muscle with negative margins but with closest margin 1 mm. MRI without pelvic or inguinal lymphadenopathy. She completed chemoradiation on 18 and presents today or follow up.  CT CAP 20 and MRI Pelvis on 8/10/2018 without any evidence of recurrence.  She developed an occlusive thrombus in the right common femoral, femoral and popliteal veins in March.  She has been following with oncology with planned imaging again in February.     Interval history: Maribel has been doing well. No pain or rectal bleeding. Some loose stools continued but these are now only about once a weeks with imodium and a probiotic.     Physical Examination: Exam was chaperoned by Taylor Ng EMT   /79 (BP Location: Left arm, Patient Position: Sitting, Cuff Size: Adult Regular)   Pulse 75   Temp 99.3  F (37.4  C) (Oral)   Ht 5' 6\"   Wt 92 lb 11.2 oz   SpO2 96%   BMI 14.96 kg/m      Alert, oriented, in no acute distress, sitting comfortably. No palpable inguinal lymph nodes but some palpable nodularity higer up in her groin at prior scar site. Perianal skin intact with small skin tags present. No visible or palpable lesions. ITZEL with some palpable scarring in the left anterior position. Anoscopy with small telangectasia and " scarring present but no lesions.  There is a well healed right anterior perianal scar measuring ~10 mm in diameter with no visible recurrent dysplasia or cancer.    Assessment/Plan: No evidence of recurrence on exam today. Repeat anoscopy in 4 months. Continue to follow with oncology with Dr. Tucker and she is scheduled in February with CT. Encouraged the patient to contact the clinic in the meantime with any questions or concerns. Patient's questions were answered to her stated satisfaction and she is in agreement with this plan.    Medical history:  Past Medical History:   Diagnosis Date     Abnormal coagulation profile     p [id number] heterozygote      Age-related osteoporosis without current pathological fracture 6/22/2019     Anemia      Antiplatelet or antithrombotic long-term use      ASCUS with positive high risk HPV 2007, 2015    + HPV 56, 54,& 6, colp - TAL III, Leep =TAL II     Basal cell carcinoma      Hypertension      Immunosuppressed status (H)     due meds     Kidney replaced by transplant 9/04    Living donor recipient,  Rejection 7/2005     LSIL (low grade squamous intraepithelial lesion) on Pap smear 4/2013    +HPV 33 or 45, 61       PAD (peripheral artery disease) (H)      PONV (postoperative nausea and vomiting)      Squamous cell lung cancer (H)      Thrombosis of leg 1967     Unspecified disorder of kidney and ureter     X-linked dominant Alport's syndrome.       Surgical history:  Past Surgical History:   Procedure Laterality Date     BIOPSY ANAL N/A 3/14/2018    Procedure: BIOPSY ANAL;;  Surgeon: Shabbir Leo MD;  Location: [facility]      NONSPECIFIC PROCEDURE      Thrombectomy     C NONSPECIFIC PROCEDURE  1955 and 1959    Bilater eye surgery - correction for crossed eyes     C NONSPECIFIC PROCEDURE  1998    oopherectomy L     C NONSPECIFIC PROCEDURE  1967    open kidney biopsy - L     C TRANSPLANTATION OF KIDNEY  9/04    recipient -- done [facility]     COLONOSCOPY       COLONOSCOPY N/A  8/9/2017    Procedure: COMBINED COLONOSCOPY, SINGLE OR MULTIPLE BIOPSY/POLYPECTOMY BY BIOPSY;;  Surgeon: Sushil Hyatt MD;  Location: [facility] GI     COLPOSCOPY,LOOP ELECTRD CERVIX EXCIS  03/11/08    TAL II     CONIZATION LEEP  7/17/2013    Procedure: CONIZATION LEEP;;  Surgeon: Liliana Renteria MD;  Location: [facility]     CONIZATION LEEP N/A 8/17/2016    Procedure: CONIZATION LEEP;  Surgeon: Liliana Renteria MD;  Location: [facility]     EXAM UNDER ANESTHESIA ANUS  7/15/2014    Procedure: EXAM UNDER ANESTHESIA ANUS;  Surgeon: Radha Musa MD;  Location: [facility]     EXAM UNDER ANESTHESIA ANUS N/A 3/14/2018    Procedure: EXAM UNDER ANESTHESIA ANUS;  Anal Exam Under Anesthesia With Excision of anal lesion, proctoscopy;  Surgeon: Shabbir Leo MD;  Location: [facility]     EYE SURGERY       LASER CO2 EXCISE VULVA WIDE LOCAL  7/15/2014    Procedure: LASER CO2 EXCISE VULVA WIDE LOCAL;  Surgeon: Liliana Renteria MD;  Location: [facility]     LASER CO2 VAGINA  7/17/2013    Procedure: LASER CO2 VAGINA;;  Surgeon: Liliana Renteria MD;  Location: [facility]     LASER CO2 VAGINA N/A 9/25/2018    Procedure: LASER CO2 VAGINA;  Exam Under Anesthesia, CO2 Laser Ablation of Upper Vagina and Cervix;  Surgeon: Pati Garcia MD;  Location: [facility]     MICROSCOPY ANAL  7/17/2013    Procedure: MICROSCOPY ANAL;  Anal Microscopy,  EUA vagina,Colposcopy Of Vagina And Vulva, Vaginal Biopsies, Omniguide Co2 Laser To Vagina and vulva, Loop Electrosurgical Excision Procedure To Cervix;  Surgeon: Radha Musa MD;  Location: [facility]     MICROSCOPY ANAL  7/15/2014    Procedure: MICROSCOPY ANAL;  Surgeon: Radha Musa MD;  Location: [facility]       Problem list:    Patient Active Problem List    Diagnosis Date Noted     Acute deep vein thrombosis (DVT) of proximal vein of lower extremity, unspecified laterality (H) 05/29/2020     Priority: Medium     Dx 3/2020  Rx for Eliquis but financial issues currently so on  Lovenox.  Plan to restart when she can afford  Stopped the Plavix but continuing her Pletal       Age-related osteoporosis without current pathological fracture 06/22/2019     Priority: Medium     Aftercare following organ transplant 05/05/2018     Priority: Medium     Malignant neoplasm of anal canal (H) 04/09/2018     Priority: Medium     Cherry angioma 06/12/2016     Priority: Medium     Skin cancer screening 06/12/2016     Priority: Medium     Intertrigo 06/12/2016     Priority: Medium     History of basal cell carcinoma 06/12/2016     Priority: Medium     Alopecia 10/27/2015     Priority: Medium     Vaginal dysplasia 04/02/2015     Priority: Medium     Lumbago 10/16/2014     Priority: Medium     Squamous cell lung cancer (H)      Priority: Medium     Following with oncology   Had radiation therapy - 3 sessions  F/u CT completed  PET scheduled 6/16/14       Peripheral artery disease (H) 01/16/2014     Priority: Medium     YUNIOR III (vulvar intraepithelial neoplasia III) 09/03/2013     Priority: Medium     History of basal cell carcinoma 05/24/2013     Priority: Medium     Immunosuppression (H)      Priority: Medium     HTN, kidney transplant related      Priority: Medium     CARDIOVASCULAR SCREENING; LDL GOAL LESS THAN 100 10/31/2010     Priority: Medium     S/P LEEP of cervix 03/11/2008     Priority: Medium     1/07: + HPV 6 Low risk  10/07: ASCUS, + HPV 56, 54 & 6. 12/07: [place]: TAL II  3/11/08: LEEP - TAL II  8/08: ASCUS, ECC atypia, +HPV 82  9/08: [place] - Atypia  5/09: NIL pap, 11/09: NIL pap, neg HPV  4/13: LSIL, + HPV 33 or 45, 61. 5/15/13: [place] - VAIN II & III,TAL II. Referred to gyn onc.   6/20/13: [place] with gyn onc. Plan LEEP and CO2 laser to vagina, cx and vaginal vault. Reminder done  7/17/13: Anal Microscopy, EUA vagina,Colposcopy Of Vagina And Vulva, Vaginal Biopsies, Omniguide Co2 Laser To Vagina and vulva, Loop Electrosurgical Excision Procedure To Cervix- YUNIOR 1,2 & 3: AIN 2, VAIN 2, Leep bx benign  Plan colp at gyn onc in 4 months. Reminder sent.  12/2/13: ASC H, + HPV 33/45, HSIL anal pap, YUNIOR 1 & 2, AIN 1 & 3. F/u deferred due to lung ca radiation  5/8/14: ASCUS, + HPV 33/45. 5/22/14: consult with Dr. Renteria, surgery planned in reminders  7/15/14: surgery-Exam under anesthesia, vulva and vaginal colposcopy, vulvar biopsy, CO2 laser of the vulva, vaginal pap smear - Pap LSIL, + HPV 33/45. Vulvar bx HSIL Plan repeat colp and pap with NP at gyn onc.  9/17/15: [place] with gyn onc. Due for repeat colp 3 -4 months. Tracking started.  5/26/16: Pap with vaginal Bx--Atyp/YUNIOR 1. Plan: Endometrial Bx  6/28/16: EMB, ECC--TAL 3. Plan: LEEP  8/17/16: LEEP--TAL 1, YUNIOR 1. Plan: Pap and colposcopy 6mo, due 2/17/17.  06/01/17 Pap--NIL/+HR HPV. [place]--negative. Plan: repeat colp and pap in 6mo.  12/14/17 [place]--visually normal, no Bx. Plan: colp & pap in 6mo.       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     Priority: Medium       Medications:  Current Outpatient Medications   Medication Sig Dispense Refill     ACETAMINOPHEN PO Take 1-2 tablets by mouth every 8 hours as needed for pain       amoxicillin (AMOXIL) 500 MG capsule Take 4 capsules (2,000 mg) by mouth once as needed Prior to dental procedures 16 capsule 3     atorvastatin (LIPITOR) 20 MG tablet TAKE ONE TABLET BY MOUTH EVERY DAY 90 tablet 1     calcium carbonate 600 mg-vitamin D 400 units (CALTRATE) 600-400 MG-UNIT per tablet Take 1 tablet by mouth 2 times daily       calcium citrate (CITRACAL) 950 MG tablet Take 1 tablet (950 mg) by mouth 2 times daily 60 tablet 3     cilostazol (PLETAL) 100 MG tablet TAKE ONE TABLET BY MOUTH TWICE A DAY 60 tablet 6     enoxaparin ANTICOAGULANT (LOVENOX) 60 MG/0.6ML syringe Inject 0.5 mLs (50 mg) Subcutaneous every 24 hours 30 Syringe 1     Lactobacillus-Inulin (Errund) CAPS TAKE ONE CAPSULE BY  MOUTH EVERY DAY 90 capsule 3     losartan (COZAAR) 50 MG tablet Take 1 tablet (50 mg) by mouth daily 90 tablet 1     metoprolol tartrate (LOPRESSOR) 100 MG tablet TAKE ONE TABLET BY MOUTH TWICE A  tablet 0     NIFEdipine ER OSMOTIC (PROCARDIA XL) 90 MG 24 hr tablet TAKE ONE TABLET BY MOUTH EVERY DAY 30 tablet 6     predniSONE (DELTASONE) 5 MG tablet TAKE ONE TABLET BY MOUTH EVERY DAY 90 tablet 3     sirolimus (GENERIC EQUIVALENT) 1 MG tablet Take 2 tablets (2 mg) by mouth daily 6 tablet 0     sirolimus (GENERIC EQUIVALENT) 1 MG tablet Take 2 tablets (2 mg) by mouth daily 180 tablet 3     COMPRESSION STOCKINGS Wear compression stockings on the right leg or both legs most time during the day and take them off at night. (Patient not taking: Reported on 2020) 2 each 2       Allergies:  Allergies   Allergen Reactions     Ultracet Nausea and Vomiting and Hives     Fentanyl Nausea     Has had since she initially had the issues with nausea and tolerated it OK.      Hydrocodone Nausea and Vomiting and Hives       Family history:  Family History   Problem Relation Age of Onset     Diabetes Father         type 2 diag age,60's     Alcohol/Drug Father      Arthritis Father      Hypertension Father      Lipids Father         high cholesterol     Arthritis Mother      Diabetes Mother      Depression Mother      Heart Disease Mother      Neurologic Disorder Mother      Obesity Mother      Psychotic Disorder Mother      Thyroid Disease Mother      Gynecology Sister         Precancerous cell removal from cervix at age 45     Depression Sister      Allergies Sister      Alcohol/Drug Sister      Neurologic Disorder Sister      Cerebrovascular Disease Paternal Grandmother          of a stroke in her 80's     Diabetes Paternal Grandmother      Alcohol/Drug Son      Colon Polyps Sister      Colon Cancer No family hx of      Crohn's Disease No family hx of      Ulcerative Colitis No family hx of      Melanoma No family hx of  "     Skin Cancer No family hx of        Social history:  Social History     Tobacco Use     Smoking status: Current Some Day Smoker     Packs/day: 0.30     Years: 35.00     Pack years: 10.50     Types: Cigarettes     Start date: 1/1/1967     Smokeless tobacco: Never Used     Tobacco comment: Couple times a week. 1-2 cigs 1-2x a week.   Substance Use Topics     Alcohol use: Yes     Alcohol/week: 0.0 standard drinks     Frequency: Monthly or less     Drinks per session: 1 or 2     Binge frequency: Less than monthly     Comment: rarely     Marital status: .    Nursing Notes:   Taylor Shepherd EMT  9/2/2020  2:03 PM  Signed  Chief Complaint   Patient presents with     RECHECK     Four month re-check anal cancer.       Vitals:    09/02/20 1345   BP: 125/79   BP Location: Left arm   Patient Position: Sitting   Cuff Size: Adult Regular   Pulse: 75   Temp: 99.3  F (37.4  C)   TempSrc: Oral   SpO2: 96%   Weight: 92 lb 11.2 oz   Height: 5' 6\"       Body mass index is 14.96 kg/m .      RODERICK Love                        Total face to face time was 15 minutes, >50% counseling.    Vianney Worthy NP-C  Colon and Rectal Surgery  [facility]    "

## 2020-09-08 ENCOUNTER — OFFICE VISIT (OUTPATIENT)
Dept: ONCOLOGY | Facility: CLINIC | Age: 70
End: 2020-09-08
Attending: OBSTETRICS & GYNECOLOGY
Payer: COMMERCIAL

## 2020-09-08 VITALS
HEIGHT: 66 IN | TEMPERATURE: 98.3 F | RESPIRATION RATE: 16 BRPM | WEIGHT: 93.5 LBS | SYSTOLIC BLOOD PRESSURE: 148 MMHG | DIASTOLIC BLOOD PRESSURE: 76 MMHG | BODY MASS INDEX: 15.03 KG/M2 | OXYGEN SATURATION: 96 % | HEART RATE: 75 BPM

## 2020-09-08 DIAGNOSIS — N89.3 VAGINAL DYSPLASIA: ICD-10-CM

## 2020-09-08 DIAGNOSIS — N90.3 VULVAR DYSPLASIA: ICD-10-CM

## 2020-09-08 DIAGNOSIS — N87.9 CERVICAL DYSPLASIA: Primary | ICD-10-CM

## 2020-09-08 PROCEDURE — 99213 OFFICE O/P EST LOW 20 MIN: CPT | Mod: 25 | Performed by: OBSTETRICS & GYNECOLOGY

## 2020-09-08 PROCEDURE — 88305 TISSUE EXAM BY PATHOLOGIST: CPT | Performed by: OBSTETRICS & GYNECOLOGY

## 2020-09-08 PROCEDURE — 57452 EXAM OF CERVIX W/SCOPE: CPT | Mod: ZF

## 2020-09-08 PROCEDURE — 56820 COLPOSCOPY VULVA: CPT | Mod: ZF

## 2020-09-08 PROCEDURE — G0463 HOSPITAL OUTPT CLINIC VISIT: HCPCS | Mod: ZF

## 2020-09-08 PROCEDURE — 57505 ENDOCERVICAL CURETTAGE: CPT | Mod: ZF

## 2020-09-08 PROCEDURE — G0476 HPV COMBO ASSAY CA SCREEN: HCPCS | Mod: GZ | Performed by: OBSTETRICS & GYNECOLOGY

## 2020-09-08 PROCEDURE — 87624 HPV HI-RISK TYP POOLED RSLT: CPT | Performed by: OBSTETRICS & GYNECOLOGY

## 2020-09-08 PROCEDURE — 57420 EXAM OF VAGINA W/SCOPE: CPT | Mod: ZF

## 2020-09-08 PROCEDURE — 57456 ENDOCERV CURETTAGE W/SCOPE: CPT

## 2020-09-08 PROCEDURE — G0463 HOSPITAL OUTPT CLINIC VISIT: HCPCS | Mod: 25

## 2020-09-08 PROCEDURE — 56820 COLPOSCOPY VULVA: CPT | Mod: ZP | Performed by: OBSTETRICS & GYNECOLOGY

## 2020-09-08 PROCEDURE — 57456 ENDOCERV CURETTAGE W/SCOPE: CPT | Mod: ZP | Performed by: OBSTETRICS & GYNECOLOGY

## 2020-09-08 PROCEDURE — G0145 SCR C/V CYTO,THINLAYER,RESCR: HCPCS | Performed by: OBSTETRICS & GYNECOLOGY

## 2020-09-08 ASSESSMENT — ENCOUNTER SYMPTOMS
LIGHT-HEADEDNESS: 0
ABDOMINAL PAIN: 0
LEG PAIN: 1
SYNCOPE: 0
NAUSEA: 0
SLEEP DISTURBANCES DUE TO BREATHING: 0
EXERCISE INTOLERANCE: 0
HEARTBURN: 1
RECTAL PAIN: 0
BLOOD IN STOOL: 0
BLOATING: 1
HYPOTENSION: 0
JAUNDICE: 0
CONSTIPATION: 0
BOWEL INCONTINENCE: 1
HYPERTENSION: 0
PALPITATIONS: 0
DIARRHEA: 1
VOMITING: 0
ORTHOPNEA: 0

## 2020-09-08 ASSESSMENT — MIFFLIN-ST. JEOR: SCORE: 975.61

## 2020-09-08 ASSESSMENT — PAIN SCALES - GENERAL: PAINLEVEL: NO PAIN (0)

## 2020-09-08 NOTE — NURSING NOTE
Procedure discussed. Consent signed. Time out completed. Both forms placed into scanning.Prior to the start of the procedure and with procedural staff participation, I verbally confirmed the patient s identity using two indicators, relevant allergies, that the procedure was appropriate and matched the consent or emergent situation, and that the correct equipment/implants were available. Immediately prior to starting the procedure I conducted the Time Out with the procedural staff and re-confirmed the patient s name, procedure, and site/side. (The Joint Commission universal protocol was followed.)  Yes    Sedation (Moderate or Deep): None    ECC and PAP smear completed    Post procedure pain: 0    Cynthia Cruz RN

## 2020-09-08 NOTE — PATIENT INSTRUCTIONS
Vaginal, vulvar and cervical colposcopy done    Pap smear and ECC.  You will be contacted with results    Anticipate return visit in one year    Pati Garcia MD  Gynecologic Oncology  [facility]

## 2020-09-08 NOTE — LETTER
9/8/2020         RE: Maribel Yang  [address]  [facility] [address]        Dear Colleague,    Thank you for referring your patient, Maribel Yang, to the [facility]. Please see a copy of my visit note below.                            Consult Notes on Referred Patient    Date: 9/8/2020       Patient presents today for evaluation.     Her history is as follows:  Brief Cancer History:   1/07: + HPV 6 Low risk   10/07: ASCUS, + HPV 56, 54 & 6. 12/07: [place]: TAL II   3/11/08: LEEP - TAL II   8/08: ASCUS, ECC atypia, +HPV 82   9/08: [place] - Atypia   5/09: NIL pap, 11/09: NIL pap, neg HPV   4/13: LSIL, + HPV 33 or 45, 61.   5/15/13: [place] - VAIN II & III,TAL II. Referred to gyn onc.   6/20/13: [place] with gyn onc. Plan LEEP and CO2 laser to vagina, cx and vaginal vault.   7/17/13: [place] in OR, Co2 laser vagina and vulva , YUNIOR 1- 2, AIN 2-3, VAIN 2-3, LEEP, ECC negative.  12/2/13: Colonoscopy negative. Pap ASC-H  5/8/14:  Pap ASCUS/AGC  5/22/14:  Pap ASCUS, Anal pap ASC-US, labial biopsy negative  7/15/14:  Pap LSIL, right & left vulvar biopsy HSIL, CO2 laser  4/2/15:  Pap ASCUS, vaginal suburethral biopsy VIN1-2  5/26/16:  Atypical glandular cells, pap     6/28/16:  EMB, ECC.  Endometrial biopsy with superficial atrophic benign endometrium.  ECC CIN3     8/17/16:  EUA, colposcopy vagina, LEEP, Vaginal biopsies.  LEEP CIN1, margins negative.  ECC with reactive change, vaginal biopsy VIN1       6/1/17: ECC + cervical biopsy negative, pap NILM + HPV     3/14/18:  Invasive anal squamous cell carcinoma, excised but close margins.    Underwent chemoradiation completed on 6/11/18.     9/4/18:   Pap ASC-H, vaginal bx VAIN2, HR HPV-     9/25/18:  CO2 laser ablation upper vagina and cervix     4/9/19:  Pap NILM, HR HPV negative     9/10/19:  Pap NILM, HR HPV postive other,  ECC negative, cervical biopsy with inflammatory changes    The patient returns today for follow-up.  She continues  to have follow-up related to her anal cancer with CT in 2/2020.  Was diagnosed with  LE DVT, on apixaban.     Review of Systems:  Answers for HPI/ROS submitted by the patient on 9/8/2020   General Symptoms: No  Skin Symptoms: No  HENT Symptoms: No  EYE SYMPTOMS: No  HEART SYMPTOMS: Yes  LUNG SYMPTOMS: No  INTESTINAL SYMPTOMS: Yes  URINARY SYMPTOMS: No  GYNECOLOGIC SYMPTOMS: No  BREAST SYMPTOMS: No  SKELETAL SYMPTOMS: No  BLOOD SYMPTOMS: No  NERVOUS SYSTEM SYMPTOMS: No  MENTAL HEALTH SYMPTOMS: No  Chest pain or pressure: No  Fast or irregular heartbeat: No  Pain in legs with walking: Yes  Trouble breathing while lying down: No  Fingers or toes appear blue: No  Low blood pressure: No  Fainting: No  Murmurs: No  Pacemaker: No  Varicose veins: No  Edema or swelling: Yes  Wake up at night with shortness of breath: No  Light-headedness: No  Heart burn or indigestion: Yes  Nausea: No  Vomiting: No  Abdominal pain: No  Bloating: Yes  Constipation: No  Diarrhea: Yes  Blood in stool: No  Black stools: No  Rectal or Anal pain: No  Fecal incontinence: Yes  Yellowing of skin or eyes: No  Vomit with blood: No  Change in stools: No    Past Medical History:    Past Medical History:   Diagnosis Date     Abnormal coagulation profile     p [id number] heterozygote      Age-related osteoporosis without current pathological fracture 6/22/2019     Anemia      Antiplatelet or antithrombotic long-term use      ASCUS with positive high risk HPV 2007, 2015    + HPV 56, 54,& 6, colp - TAL III, Leep =TAL II     Basal cell carcinoma      Hypertension      Immunosuppressed status (H)     due meds     Kidney replaced by transplant 9/04    Living donor recipient,  Rejection 7/2005     LSIL (low grade squamous intraepithelial lesion) on Pap smear 4/2013    +HPV 33 or 45, 61       PAD (peripheral artery disease) (H)      PONV (postoperative nausea and vomiting)      Squamous cell lung cancer (H)      Thrombosis of leg 1967     Unspecified disorder of  kidney and ureter     X-linked dominant Alport's syndrome.         Past Surgical History:    Past Surgical History:   Procedure Laterality Date     BIOPSY ANAL N/A 3/14/2018    Procedure: BIOPSY ANAL;;  Surgeon: Shabbir Leo MD;  [facility]     C NONSPECIFIC PROCEDURE      Thrombectomy     C NONSPECIFIC PROCEDURE  1955 and 1959    [place] eye surgery - correction for crossed eyes     C NONSPECIFIC PROCEDURE  1998    oopherectomy L     C NONSPECIFIC PROCEDURE  1967    open kidney biopsy - L     C TRANSPLANTATION OF KIDNEY  9/04    recipient -- done at [facility]     COLONOSCOPY       COLONOSCOPY N/A 8/9/2017    Procedure: COMBINED COLONOSCOPY, SINGLE OR MULTIPLE BIOPSY/POLYPECTOMY BY BIOPSY;;  Surgeon: Sushil Hyatt MD;  [facility]     COLPOSCOPY,LOOP ELECTRD CERVIX EXCIS  03/11/08    TAL II     CONIZATION LEEP  7/17/2013    Procedure: CONIZATION LEEP;;  Surgeon: Liliana Renteria MD;  [facility]     CONIZATION LEEP N/A 8/17/2016    Procedure: CONIZATION LEEP;  Surgeon: Liliana Renteria MD;  [facility]     EXAM UNDER ANESTHESIA ANUS  7/15/2014    Procedure: EXAM UNDER ANESTHESIA ANUS;  Surgeon: Radha Musa MD;  [facility]     EXAM UNDER ANESTHESIA ANUS N/A 3/14/2018    Procedure: EXAM UNDER ANESTHESIA ANUS;  Anal Exam Under Anesthesia With Excision of anal lesion, proctoscopy;  Surgeon: Shabbir Leo MD;  [facility]     EYE SURGERY       LASER CO2 EXCISE VULVA WIDE LOCAL  7/15/2014    Procedure: LASER CO2 EXCISE VULVA WIDE LOCAL;  Surgeon: Liliana Renteria MD;  [facility]     LASER CO2 VAGINA  7/17/2013    Procedure: LASER CO2 VAGINA;;  Surgeon: Liliana Renteria MD;  [facility]     LASER CO2 VAGINA N/A 9/25/2018    Procedure: LASER CO2 VAGINA;  Exam Under Anesthesia, CO2 Laser Ablation of Upper Vagina and Cervix;  Surgeon: Pati Garcia MD;  [facility]     MICROSCOPY ANAL  7/17/2013    Procedure: MICROSCOPY ANAL;  Anal Microscopy,   EUA vagina,Colposcopy Of Vagina And Vulva, Vaginal Biopsies, Omniguide Co2 Laser To Vagina and vulva, Loop Electrosurgical Excision Procedure To Cervix;  Surgeon: Radha Musa MD;  [facility]     MICROSCOPY ANAL  7/15/2014    Procedure: MICROSCOPY ANAL;  Surgeon: Radha Musa MD;  [facility]         Health Maintenance:  Health Maintenance Due   Topic Date Due     ZOSTER IMMUNIZATION (1 of 2) 12/15/2002     ADVANCE CARE PLANNING  10/03/2017     PNEUMOCOCCAL IMMUNIZATION 65+ HIGH/HIGHEST RISK (2 of 2 - PPSV23) 06/10/2019     MAMMO SCREENING  02/27/2020     MEDICARE ANNUAL WELLNESS VISIT  03/13/2020     FALL RISK ASSESSMENT  03/13/2020     INFLUENZA VACCINE (1) 09/01/2020       Current Medications:     has a current medication list which includes the following prescription(s): acetaminophen, amoxicillin, atorvastatin, calcium carbonate 600 mg-vitamin d 400 units, calcium citrate, cilostazol, enoxaparin anticoagulant, culturee digestive health, losartan, metoprolol tartrate, nifedipine er osmotic, prednisone, sirolimus, sirolimus, and COMPRESSION STOCKINGS.       Allergies:     [unfilled]        Social History:     Social History     Tobacco Use     Smoking status: Current Some Day Smoker     Packs/day: 0.30     Years: 35.00     Pack years: 10.50     Types: Cigarettes     Start date: 1/1/1967     Smokeless tobacco: Never Used     Tobacco comment: Couple times a week. 1-2 cigs 1-2x a week.   Substance Use Topics     Alcohol use: Yes     Alcohol/week: 0.0 standard drinks     Frequency: Monthly or less     Drinks per session: 1 or 2     Binge frequency: Less than monthly     Comment: rarely       History   Drug Use No           Family History:     The patient's family history is notable for:    Family History   Problem Relation Age of Onset     Diabetes Father         type 2 diag age,60's     Alcohol/Drug Father      Arthritis Father      Hypertension Father      Lipids Father        "  high cholesterol     Arthritis Mother      Diabetes Mother      Depression Mother      Heart Disease Mother      Neurologic Disorder Mother      Obesity Mother      Psychotic Disorder Mother      Thyroid Disease Mother      Gynecology Sister         Precancerous cell removal from cervix at age 45     Depression Sister      Allergies Sister      Alcohol/Drug Sister      Neurologic Disorder Sister      Cerebrovascular Disease Paternal Grandmother          of a stroke in her 80's     Diabetes Paternal Grandmother      Alcohol/Drug Son      Colon Polyps Sister      Colon Cancer No family hx of      Crohn's Disease No family hx of      Ulcerative Colitis No family hx of      Melanoma No family hx of      Skin Cancer No family hx of          Physical Exam:     Ht 1.676 m (5' 5.98\")   Wt 42.4 kg (93 lb 8 oz)   BMI 15.10 kg/m    Body mass index is 15.1 kg/m .    General Appearance:  Thin, alert, no distress      Musculoskeletal:         extremities non tender and without edema     Skin:    no lesions or rashes      Neurological:   normal gait, no gross defects                Psychiatric:      appropriate mood and affect                               Hematological:            normal cervical, supraclavicular and inguinal lymph nodes                Gastrointestinal:          abdomen soft, non-tender, non-distended, no organomegaly or masses     Colposcopy Note:     Written and verbal informed consent obtained.     Prior to the start of the procedure and with procedural staff participation, I verbally confirmed the patient s identity using two indicators, relevant allergies, that the procedure was appropriate and matched the consent or emergent situation, and that the correct equipment/implants were available. Immediately prior to starting the procedure I conducted the Time Out with the procedural staff and re-confirmed the patient s name, procedure, and site/side. (The Joint Commission universal protocol was " followed.)  Yes     Sedation (Moderate or Deep): None     Genitourinary: External genitalia and urethral meatus appears normal, BUS negative, no lesions. Vagina is smooth without nodularity or masses.  Vaginal apex and cervix scarred from multiple procedures.     After placement of speculum and full visualization of cervix, colposcopy of cervix and entire vagina performed.  Entire cervix and upper vagina visualized, no gross lesions. Pap smear obtained.  Cervix clean with saline and green filter applied, no changes.   5% acetic acid solution applied to cervix and visualized under colposcopic enhancement.  Small os, TMZ not seen. ECC done.  No significant changes since prior.  No biopsies done. Speculum removed and colposcopy of external genitalia and vulva done.  Gauze soaked with 5% acetic acid applied and viewed under colposcopic enhancement.  No discrete lesions.  No biopsies.      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           Plan:      1.)        Long standing history of cervical and vulvar dysplasia:  Pap and ECC done today.  She will be contacted with results and recs for follow-up.        2.)        Anal cancer:  Status post surgery, chemoXRT. Follow-up with Medical Oncology and CR.       3.)        Labs and/or tests ordered include:  Pap, biopsy     Pati Garcia MD  Gynecologic Oncology  [facility]    CC  Patient Care Team:  Lisa Martinez DO as PCP - General (Family Practice)  Hitesh See MD as MD (Nephrology)  Nyasia Gaines MD as Referring Physician (OB/Gyn)  Joel Davis MD as MD (Family Practice)  Rosalie Pelletier PA-C as Physician Assistant (Physician Assistant)  Lisa Martinez DO as Assigned  PCP  Liliana Renteria MD as MD (Oncology)  Leelee Evans MD as MD (INTERNAL MEDICINE - ENDOCRINOLOGY, DIABETES & METABOLISM)  Juan Rene MD as MD (Internal Medicine - Medical Oncology)  Melody Mejia, RN as Specialty Care Coordinator (Hematology & Oncology)

## 2020-09-08 NOTE — PROGRESS NOTES
Consult Notes on Referred Patient    Date: 9/8/2020       Patient presents today for evaluation.     Her history is as follows:  Brief Cancer History:   1/07: + HPV 6 Low risk   10/07: ASCUS, + HPV 56, 54 & 6. 12/07: [place]: TAL II   3/11/08: LEEP - TAL II   8/08: ASCUS, ECC atypia, +HPV 82   9/08: [place] - Atypia   5/09: NIL pap, 11/09: NIL pap, neg HPV   4/13: LSIL, + HPV 33 or 45, 61.   5/15/13: [place] - VAIN II & III,TAL II. Referred to gyn onc.   6/20/13: [place] with gyn onc. Plan LEEP and CO2 laser to vagina, cx and vaginal vault.   7/17/13: [place] in OR, Co2 laser vagina and vulva , YUNIOR 1- 2, AIN 2-3, VAIN 2-3, LEEP, ECC negative.  12/2/13: Colonoscopy negative. Pap ASC-H  5/8/14:  Pap ASCUS/AGC  5/22/14:  Pap ASCUS, Anal pap ASC-US, labial biopsy negative  7/15/14:  Pap LSIL, right & left vulvar biopsy HSIL, CO2 laser  4/2/15:  Pap ASCUS, vaginal suburethral biopsy VIN1-2  5/26/16:  Atypical glandular cells, pap     6/28/16:  EMB, ECC.  Endometrial biopsy with superficial atrophic benign endometrium.  ECC CIN3     8/17/16:  EUA, colposcopy vagina, LEEP, Vaginal biopsies.  LEEP CIN1, margins negative.  ECC with reactive change, vaginal biopsy VIN1       6/1/17: ECC + cervical biopsy negative, pap NILM + HPV     3/14/18:  Invasive anal squamous cell carcinoma, excised but close margins.    Underwent chemoradiation completed on 6/11/18.     9/4/18:   Pap ASC-H, vaginal bx VAIN2, HR HPV-     9/25/18:  CO2 laser ablation upper vagina and cervix     4/9/19:  Pap NILM, HR HPV negative     9/10/19:  Pap NILM, HR HPV postive other,  ECC negative, cervical biopsy with inflammatory changes    The patient returns today for follow-up.  She continues to have follow-up related to her anal cancer with CT in 2/2020.  Was diagnosed with  LE DVT, on apixaban.     Review of Systems:  Answers for HPI/ROS submitted by the patient on 9/8/2020   General Symptoms: No  Skin Symptoms: No  HENT Symptoms: No  EYE  SYMPTOMS: No  HEART SYMPTOMS: Yes  LUNG SYMPTOMS: No  INTESTINAL SYMPTOMS: Yes  URINARY SYMPTOMS: No  GYNECOLOGIC SYMPTOMS: No  BREAST SYMPTOMS: No  SKELETAL SYMPTOMS: No  BLOOD SYMPTOMS: No  NERVOUS SYSTEM SYMPTOMS: No  MENTAL HEALTH SYMPTOMS: No  Chest pain or pressure: No  Fast or irregular heartbeat: No  Pain in legs with walking: Yes  Trouble breathing while lying down: No  Fingers or toes appear blue: No  Low blood pressure: No  Fainting: No  Murmurs: No  Pacemaker: No  Varicose veins: No  Edema or swelling: Yes  Wake up at night with shortness of breath: No  Light-headedness: No  Heart burn or indigestion: Yes  Nausea: No  Vomiting: No  Abdominal pain: No  Bloating: Yes  Constipation: No  Diarrhea: Yes  Blood in stool: No  Black stools: No  Rectal or Anal pain: No  Fecal incontinence: Yes  Yellowing of skin or eyes: No  Vomit with blood: No  Change in stools: No    Past Medical History:    Past Medical History:   Diagnosis Date     Abnormal coagulation profile     p [id number]>A heterozygote      Age-related osteoporosis without current pathological fracture 6/22/2019     Anemia      Antiplatelet or antithrombotic long-term use      ASCUS with positive high risk HPV 2007, 2015    + HPV 56, 54,& 6, colp - TAL III, Leep =TAL II     Basal cell carcinoma      Hypertension      Immunosuppressed status (H)     due meds     Kidney replaced by transplant 9/04    Living donor recipient,  Rejection 7/2005     LSIL (low grade squamous intraepithelial lesion) on Pap smear 4/2013    +HPV 33 or 45, 61       PAD (peripheral artery disease) (H)      PONV (postoperative nausea and vomiting)      Squamous cell lung cancer (H)      Thrombosis of leg 1967     Unspecified disorder of kidney and ureter     X-linked dominant Alport's syndrome.         Past Surgical History:    Past Surgical History:   Procedure Laterality Date     BIOPSY ANAL N/A 3/14/2018    Procedure: BIOPSY ANAL;;  Surgeon: Shabbir Leo MD;  Location:  OR       NONSPECIFIC PROCEDURE      Thrombectomy     C NONSPECIFIC PROCEDURE  1955 and 1959    [place] eye surgery - correction for crossed eyes     C NONSPECIFIC PROCEDURE  1998    oopherectomy L     C NONSPECIFIC PROCEDURE  1967    open kidney biopsy - L     C TRANSPLANTATION OF KIDNEY  9/04    recipient -- [facility]     COLONOSCOPY       COLONOSCOPY N/A 8/9/2017    Procedure: COMBINED COLONOSCOPY, SINGLE OR MULTIPLE BIOPSY/POLYPECTOMY BY BIOPSY;;  Surgeon: Sushil Hyatt MD;  [facility]     COLPOSCOPY,LOOP ELECTRD CERVIX EXCIS  03/11/08    TAL II     CONIZATION LEEP  7/17/2013    Procedure: CONIZATION LEEP;;  Surgeon: Liliana Renteria MD;  [facility]     CONIZATION LEEP N/A 8/17/2016    Procedure: CONIZATION LEEP;  Surgeon: Liliana Renteria MD;  [facility]     EXAM UNDER ANESTHESIA ANUS  7/15/2014    Procedure: EXAM UNDER ANESTHESIA ANUS;  Surgeon: Radha Musa MD;  [facility]     EXAM UNDER ANESTHESIA ANUS N/A 3/14/2018    Procedure: EXAM UNDER ANESTHESIA ANUS;  Anal Exam Under Anesthesia With Excision of anal lesion, proctoscopy;  Surgeon: Shabbir Leo MD;  [facility]     EYE SURGERY       LASER CO2 EXCISE VULVA WIDE LOCAL  7/15/2014    Procedure: LASER CO2 EXCISE VULVA WIDE LOCAL;  Surgeon: Liliana Renteria MD;  [facility]     LASER CO2 VAGINA  7/17/2013    Procedure: LASER CO2 VAGINA;;  Surgeon: Liliana Renteria MD;  [facility]     LASER CO2 VAGINA N/A 9/25/2018    Procedure: LASER CO2 VAGINA;  Exam Under Anesthesia, CO2 Laser Ablation of Upper Vagina and Cervix;  Surgeon: Pati Garcia MD;  [facility]     MICROSCOPY ANAL  7/17/2013    Procedure: MICROSCOPY ANAL;  Anal Microscopy,  EUA vagina,Colposcopy Of Vagina And Vulva, Vaginal Biopsies, Omniguide Co2 Laser To Vagina and vulva, Loop Electrosurgical Excision Procedure To Cervix;  Surgeon: Radha Musa MD;  [facility]     MICROSCOPY ANAL  7/15/2014    Procedure:  MICROSCOPY ANAL;  Surgeon: Radha Musa MD;  Location:  OR         Health Maintenance:  Health Maintenance Due   Topic Date Due     ZOSTER IMMUNIZATION (1 of 2) 12/15/2002     ADVANCE CARE PLANNING  10/03/2017     PNEUMOCOCCAL IMMUNIZATION 65+ HIGH/HIGHEST RISK (2 of 2 - PPSV23) 06/10/2019     MAMMO SCREENING  02/27/2020     MEDICARE ANNUAL WELLNESS VISIT  03/13/2020     FALL RISK ASSESSMENT  03/13/2020     INFLUENZA VACCINE (1) 09/01/2020       Current Medications:     has a current medication list which includes the following prescription(s): acetaminophen, amoxicillin, atorvastatin, calcium carbonate 600 mg-vitamin d 400 units, calcium citrate, cilostazol, enoxaparin anticoagulant, culturelle digestive health, losartan, metoprolol tartrate, nifedipine er osmotic, prednisone, sirolimus, sirolimus, and COMPRESSION STOCKINGS.       Allergies:     [unfilled]        Social History:     Social History     Tobacco Use     Smoking status: Current Some Day Smoker     Packs/day: 0.30     Years: 35.00     Pack years: 10.50     Types: Cigarettes     Start date: 1/1/1967     Smokeless tobacco: Never Used     Tobacco comment: Couple times a week. 1-2 cigs 1-2x a week.   Substance Use Topics     Alcohol use: Yes     Alcohol/week: 0.0 standard drinks     Frequency: Monthly or less     Drinks per session: 1 or 2     Binge frequency: Less than monthly     Comment: rarely       History   Drug Use No           Family History:     The patient's family history is notable for:    Family History   Problem Relation Age of Onset     Diabetes Father         type 2 diag age,60's     Alcohol/Drug Father      Arthritis Father      Hypertension Father      Lipids Father         high cholesterol     Arthritis Mother      Diabetes Mother      Depression Mother      Heart Disease Mother      Neurologic Disorder Mother      Obesity Mother      Psychotic Disorder Mother      Thyroid Disease Mother      Gynecology Sister          "Precancerous cell removal from cervix at age 45     Depression Sister      Allergies Sister      Alcohol/Drug Sister      Neurologic Disorder Sister      Cerebrovascular Disease Paternal Grandmother          of a stroke in her 80's     Diabetes Paternal Grandmother      Alcohol/Drug Son      Colon Polyps Sister      Colon Cancer No family hx of      Crohn's Disease No family hx of      Ulcerative Colitis No family hx of      Melanoma No family hx of      Skin Cancer No family hx of          Physical Exam:     Ht 1.676 m (5' 5.98\")   Wt 42.4 kg (93 lb 8 oz)   BMI 15.10 kg/m    Body mass index is 15.1 kg/m .    General Appearance:  Thin, alert, no distress      Musculoskeletal:         extremities non tender and without edema     Skin:    no lesions or rashes      Neurological:   normal gait, no gross defects                Psychiatric:      appropriate mood and affect                               Hematological:            normal cervical, supraclavicular and inguinal lymph nodes                Gastrointestinal:          abdomen soft, non-tender, non-distended, no organomegaly or masses     Colposcopy Note:     Written and verbal informed consent obtained.     Prior to the start of the procedure and with procedural staff participation, I verbally confirmed the patient s identity using two indicators, relevant allergies, that the procedure was appropriate and matched the consent or emergent situation, and that the correct equipment/implants were available. Immediately prior to starting the procedure I conducted the Time Out with the procedural staff and re-confirmed the patient s name, procedure, and site/side. (The Joint Commission universal protocol was followed.)  Yes     Sedation (Moderate or Deep): None     Genitourinary: External genitalia and urethral meatus appears normal, BUS negative, no lesions. Vagina is smooth without nodularity or masses.  Vaginal apex and cervix scarred from multiple " procedures.     After placement of speculum and full visualization of cervix, colposcopy of cervix and entire vagina performed.  Entire cervix and upper vagina visualized, no gross lesions. Pap smear obtained.  Cervix clean with saline and green filter applied, no changes.   5% acetic acid solution applied to cervix and visualized under colposcopic enhancement.  Small os, TMZ not seen. ECC done.  No significant changes since prior.  No biopsies done. Speculum removed and colposcopy of external genitalia and vulva done.  Gauze soaked with 5% acetic acid applied and viewed under colposcopic enhancement.  No discrete lesions.  No biopsies.      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           Plan:      1.)        Long standing history of cervical and vulvar dysplasia:  Pap and ECC done today.  She will be contacted with results and recs for follow-up.        2.)        Anal cancer:  Status post surgery, chemoXRT. Follow-up with Medical Oncology and CR.       3.)        Labs and/or tests ordered include:  Pap, biopsy     Pati Garcia MD  Gynecologic Oncology  [facility]  CC  Patient Care Team:  Lisa Martinez DO as PCP - General (Family Practice)  Hitesh See MD as MD (Nephrology)  Nyasia Gaines MD as Referring Physician (OB/Gyn)  Joel Davis MD as MD (Family Practice)  Rosalie Pelletier PA-C as Physician Assistant (Physician Assistant)  Lisa Martinez DO as Assigned PCP  Liliana Renteria MD as MD (Oncology)  Leelee Evans MD as MD (INTERNAL MEDICINE - ENDOCRINOLOGY, DIABETES & METABOLISM)  Juan Rene MD as MD (Internal Medicine - Medical Oncology)  Melody Mejia, RN as Specialty Care Coordinator (Hematology  & Oncology)  SELF, REFERRED

## 2020-09-08 NOTE — NURSING NOTE
"Oncology Rooming Note    September 8, 2020 10:39 AM   Maribel Yang is a 69 year old female who presents for:    Chief Complaint   Patient presents with     Oncology Clinic Visit     Return; YUNIOR III     Initial Vitals: BP (!) 148/76 (BP Location: Right arm, Patient Position: Sitting, Cuff Size: Adult Small)   Pulse 75   Temp 98.3  F (36.8  C) (Oral)   Resp 16   Ht 1.676 m (5' 5.98\")   Wt 42.4 kg (93 lb 8 oz)   SpO2 96%   BMI 15.10 kg/m   Estimated body mass index is 15.1 kg/m  as calculated from the following:    Height as of this encounter: 1.676 m (5' 5.98\").    Weight as of this encounter: 42.4 kg (93 lb 8 oz). Body surface area is 1.4 meters squared.  No Pain (0) Comment: Data Unavailable   No LMP recorded. Patient is postmenopausal.  Allergies reviewed: Yes  Medications reviewed: Yes    Medications: Medication refills not needed today.  Pharmacy name entered into [facility]:    [facility]  [facility] - [place], [place] - [address]  [id number] - [place], [place] - [address] [facility] - [place], [place] - [address] [place] [address] [address]    Clinical concerns: No new concerns.        Zoya Cummins CMA              "

## 2020-09-09 LAB — COPATH REPORT: NORMAL

## 2020-09-12 LAB
COPATH REPORT: NORMAL
PAP: NORMAL

## 2020-09-28 ENCOUNTER — OFFICE VISIT (OUTPATIENT)
Dept: DERMATOLOGY | Facility: CLINIC | Age: 70
End: 2020-09-28
Payer: COMMERCIAL

## 2020-09-28 DIAGNOSIS — D48.5 NEOPLASM OF UNCERTAIN BEHAVIOR OF SKIN: ICD-10-CM

## 2020-09-28 DIAGNOSIS — D84.9 IMMUNOSUPPRESSION (H): ICD-10-CM

## 2020-09-28 DIAGNOSIS — D04.30 SQUAMOUS CELL CARCINOMA IN SITU (SCCIS) OF SKIN OF FACE: ICD-10-CM

## 2020-09-28 DIAGNOSIS — M67.431 GANGLION CYST OF VOLAR ASPECT OF RIGHT WRIST: Primary | ICD-10-CM

## 2020-09-28 ASSESSMENT — PAIN SCALES - GENERAL: PAINLEVEL: NO PAIN (0)

## 2020-09-28 NOTE — PROGRESS NOTES
[facility] Dermatology Note      Dermatology Problem List:  0. Shave bx 9/28/20  The right upper forehead returned as a squamous cell insitu carcinoma, extending to the lateral margin. Recommend Moh's surgery.  The right lateral cheek returned as a seborrheic keratosis.   1. Hx ESKD s/p rental transplant 2004  2. Hx NMSC  - BCC right lower leg.   - BCC left lower leg, treated with ED &C 7/22/2016  - SCC, left mid thigh- s/p ED & C 09/12/18  3. Skin cancer screening, 12/12/18    CC:   Chief Complaint   Patient presents with     Derm Problem     Maribel is here today for a spot on her face and a cyst on her wrist      Encounter Date: Sep 28, 2020    History of Present Illness:  Ms. Maribel Yang is a 69 year old female, with a history of kidney transplantation and NMSC, who presents today for a few spots on her face and a cyst on her right wrist.     She states over the last year she had developed a lump on her right wrist. It has started bothering her lately because it rubs against her clothing and she always bumps it on tables.     She notices two rough spots on her face over the last 6 months. There is an area on her right forehead that looks like an eczema patch. She states when ever she has anything like this it is actually skin cancer. She also has a spot her right cheek that is rough. She is unable to see it very well but does not like the texture of it.     She denies any additional  painful, bleeding, nonhealing or pruritic lesions and denies any new or changing moles.    Past Medical History:   Patient Active Problem List   Diagnosis     Other specified congenital anomalies     Kidney replaced by transplant     S/P LEEP of cervix     CARDIOVASCULAR SCREENING; LDL GOAL LESS THAN 100     Immunosuppression (H)     HTN, kidney transplant related     History of basal cell carcinoma     YUNIOR III (vulvar intraepithelial neoplasia III)     Peripheral artery disease (H)     Squamous cell lung  cancer (H)     Lumbago     Vaginal dysplasia     Alopecia     Cherry angioma     Skin cancer screening     Intertrigo     History of basal cell carcinoma     Malignant neoplasm of anal canal (H)     Aftercare following organ transplant     Age-related osteoporosis without current pathological fracture     Acute deep vein thrombosis (DVT) of proximal vein of lower extremity, unspecified laterality (H)     Past Medical History:   Diagnosis Date     Abnormal coagulation profile     p [id number] heterozygote      Age-related osteoporosis without current pathological fracture 6/22/2019     Anemia      Antiplatelet or antithrombotic long-term use      ASCUS with positive high risk HPV 2007, 2015    + HPV 56, 54,& 6, colp - TAL III, Leep =TAL II     Basal cell carcinoma      Hypertension      Immunosuppressed status (H)     due meds     Kidney replaced by transplant 9/04    Living donor recipient,  Rejection 7/2005     LSIL (low grade squamous intraepithelial lesion) on Pap smear 4/2013    +HPV 33 or 45, 61       PAD (peripheral artery disease) (H)      PONV (postoperative nausea and vomiting)      Squamous cell lung cancer (H)      Thrombosis of leg 1967     Unspecified disorder of kidney and ureter     X-linked dominant Alport's syndrome.     Past Surgical History:   Procedure Laterality Date     BIOPSY ANAL N/A 3/14/2018    Procedure: BIOPSY ANAL;;  Surgeon: Shabbir Leo MD;  Location:  OR      NONSPECIFIC PROCEDURE      Thrombectomy     C NONSPECIFIC PROCEDURE  1955 and 1959    Bilater eye surgery - correction for crossed eyes     C NONSPECIFIC PROCEDURE  1998    oopherectomy L     C NONSPECIFIC PROCEDURE  1967    open kidney biopsy - L     C TRANSPLANTATION OF KIDNEY  9/04    recipient -- done [facility]     COLONOSCOPY       COLONOSCOPY N/A 8/9/2017    Procedure: COMBINED COLONOSCOPY, SINGLE OR MULTIPLE BIOPSY/POLYPECTOMY BY BIOPSY;;  Surgeon: Sushil Hyatt MD;  Location:  GI     COLPOSCOPY,[place] ELECTR  CERVIX EXCIS  03/11/08    TAL II     CONIZATION LEEP  7/17/2013    Procedure: CONIZATION LEEP;;  Surgeon: Liliana Renteria MD;  [facility]     CONIZATION LEEP N/A 8/17/2016    Procedure: CONIZATION LEEP;  Surgeon: Liliana Renteria MD;  [facility]     EXAM UNDER ANESTHESIA ANUS  7/15/2014    Procedure: EXAM UNDER ANESTHESIA ANUS;  Surgeon: Radha Musa MD;  [facility]     EXAM UNDER ANESTHESIA ANUS N/A 3/14/2018    Procedure: EXAM UNDER ANESTHESIA ANUS;  Anal Exam Under Anesthesia With Excision of anal lesion, proctoscopy;  Surgeon: Shabbir Leo MD;  [facility]     EYE SURGERY       LASER CO2 EXCISE VULVA WIDE LOCAL  7/15/2014    Procedure: LASER CO2 EXCISE VULVA WIDE LOCAL;  Surgeon: Liliana Renteria MD;  [facility]     LASER CO2 VAGINA  7/17/2013    Procedure: LASER CO2 VAGINA;;  Surgeon: Liliana Renteria MD;  [facility]     LASER CO2 VAGINA N/A 9/25/2018    Procedure: LASER CO2 VAGINA;  Exam Under Anesthesia, CO2 Laser Ablation of Upper Vagina and Cervix;  Surgeon: Pati Garcia MD;  [facility]     MICROSCOPY ANAL  7/17/2013    Procedure: MICROSCOPY ANAL;  Anal Microscopy,  EUA vagina,Colposcopy Of Vagina And Vulva, Vaginal Biopsies, Omniguide Co2 Laser To Vagina and vulva, Loop Electrosurgical Excision Procedure To Cervix;  Surgeon: Radha Musa MD;  [facility]     MICROSCOPY ANAL  7/15/2014    Procedure: MICROSCOPY ANAL;  Surgeon: Radha Musa MD;  [facility]       Social History:  The patient does not work, looking for a job. The patient denies use of tanning beds.    Medications:  Current Outpatient Medications   Medication Sig Dispense Refill     ACETAMINOPHEN PO Take 1-2 tablets by mouth every 8 hours as needed for pain       amoxicillin (AMOXIL) 500 MG capsule Take 4 capsules (2,000 mg) by mouth once as needed Prior to dental procedures 16 capsule 3     atorvastatin (LIPITOR) 20 MG tablet TAKE ONE  TABLET BY MOUTH EVERY DAY 90 tablet 1     calcium carbonate 600 mg-vitamin D 400 units (CALTRATE) 600-400 MG-UNIT per tablet Take 1 tablet by mouth 2 times daily       calcium citrate (CITRACAL) 950 MG tablet Take 1 tablet (950 mg) by mouth 2 times daily 60 tablet 3     cilostazol (PLETAL) 100 MG tablet TAKE ONE TABLET BY MOUTH TWICE A DAY 60 tablet 6     enoxaparin ANTICOAGULANT (LOVENOX) 60 MG/0.6ML syringe Inject 0.5 mLs (50 mg) Subcutaneous every 24 hours 30 Syringe 1     Lactobacillus-Inulin ([facility] DIGESTIVE [facility]) CAPS TAKE ONE CAPSULE BY MOUTH EVERY DAY 90 capsule 3     losartan (COZAAR) 50 MG tablet Take 1 tablet (50 mg) by mouth daily 90 tablet 1     metoprolol tartrate (LOPRESSOR) 100 MG tablet TAKE ONE TABLET BY MOUTH TWICE A  tablet 0     NIFEdipine ER OSMOTIC (PROCARDIA XL) 90 MG 24 hr tablet TAKE ONE TABLET BY MOUTH EVERY DAY 30 tablet 6     predniSONE (DELTASONE) 5 MG tablet TAKE ONE TABLET BY MOUTH EVERY DAY 90 tablet 3     sirolimus (GENERIC EQUIVALENT) 1 MG tablet Take 2 tablets (2 mg) by mouth daily 6 tablet 0     sirolimus (GENERIC EQUIVALENT) 1 MG tablet Take 2 tablets (2 mg) by mouth daily 180 tablet 3     COMPRESSION STOCKINGS Wear compression stockings on the right leg or both legs most time during the day and take them off at night. (Patient not taking: Reported on 9/2/2020) 2 each 2     Allergies   Allergen Reactions     Ultracet Nausea and Vomiting and Hives     Fentanyl Nausea     Has had since she initially had the issues with nausea and tolerated it OK.      Hydrocodone Nausea and Vomiting and Hives       Review of Systems:  -Skin Establ Pt: The patient denies any new rash, pruritus, or lesions that are symptomatic, changing or bleeding, except as per HPI.  -Constitutional: The patient denies fatigue, fevers, chills, unintended weight loss, and night sweats. She is feeling generally well.    Physical exam:  Vitals: There were no vitals taken for this visit.  GEN: This is a  well developed, well-nourished female in no acute distress, in a pleasant mood.    SKIN: Localized skin exam of the face, neck and right wrist was performed.   Significant for:   There is a firm well defined subcutaneous nodule on the right volar wrist.  There is an 8 mm pink scaly thin papule on the right upper forehead  There is a 9 mm tan variegated thin scaly papule on the right lateral cheek.     -No other lesions of concern on areas examined.     Impression/Plan:    1. Neoplasm of uncertain behavior right upper forehead and right lateral cheek, will perform shave biopsy to determine management,    Shave biopsy(performed by faculty): After discussion of benefits and risks including but not limited to bleeding, infection, scar, incomplete removal, recurrence, and non-diagnostic biopsy, written consent and photographs were obtained. Time-out was performed. The area was cleaned with isopropyl alcohol. 1 mL of 1% lidocaine with 1:100,000 epinephrine was injected to obtain adequate anesthesia of the lesion on the right upper forehead and right lateral cheek. A shave biopsy was performed. Hemostasis was achieved with aluminium chloride. Vaseline and a sterile dressing were applied. The patient tolerated the procedure and no complications were noted. The patient was provided with verbal and written post care instructions.     The right upper forehead returned as a squamous cell insitu carcinoma, extending to the lateral margin. Recommend Moh's surgery.    The right lateral cheek returned as a seborrheic keratosis.     2. Ganglion cyst, right volar wrist,    Because this is bothersome for the patient, will refer to orthopedic surgery for potential excision/treatment.       Follow up in for a full skin check in 3-6 months  earlier for any new or changing lesions     Staff Involved:    All risks, benefits and alternatives were discussed with patient.  Patient is in agreement and understands the assessment and plan.  All  questions were answered.  Sun Screen Education was given.   Return to Clinic in 3-6 months or sooner as needed.   Rosalie Pelletier PA-C

## 2020-09-28 NOTE — LETTER
9/28/2020       RE: Maribel Yang  [address]  [facility] [address]     Dear Colleague,    Thank you for referring your patient, Maribel Yang, to the [facility]MAT[facility] at [facility] Please see a copy of my visit note below.        [facility] Dermatology Note      Dermatology Problem List:  0. Shave bx 9/28/20  The right upper forehead returned as a squamous cell insitu carcinoma, extending to the lateral margin. Recommend Moh's surgery.  The right lateral cheek returned as a seborrheic keratosis.   1. Hx ESKD s/p rental transplant 2004  2. Hx NMSC  - BCC right lower leg.   - BCC left lower leg, treated with ED &C 7/22/2016  - SCC, left mid thigh- s/p ED & C 09/12/18  3. Skin cancer screening, 12/12/18    CC:   Chief Complaint   Patient presents with     Derm Problem     Maribel is here today for a spot on her face and a cyst on her wrist      Encounter Date: Sep 28, 2020    History of Present Illness:  Ms. Maribel Yang is a 69 year old female, with a history of kidney transplantation and NMSC, who presents today for a few spots on her face and a cyst on her right wrist.     She states over the last year she had developed a lump on her right wrist. It has started bothering her lately because it rubs against her clothing and she always bumps it on tables.     She notices two rough spots on her face over the last 6 months. There is an area on her right forehead that looks like an eczema patch. She states when ever she has anything like this it is actually skin cancer. She also has a spot her right cheek that is rough. She is unable to see it very well but does not like the texture of it.     She denies any additional  painful, bleeding, nonhealing or pruritic lesions and denies any new or changing moles.    Past Medical History:   Patient Active Problem List   Diagnosis     Other specified congenital anomalies     Kidney replaced  by transplant     S/P LEEP of cervix     CARDIOVASCULAR SCREENING; LDL GOAL LESS THAN 100     Immunosuppression (H)     HTN, kidney transplant related     History of basal cell carcinoma     YUNIOR III (vulvar intraepithelial neoplasia III)     Peripheral artery disease (H)     Squamous cell lung cancer (H)     Lumbago     Vaginal dysplasia     Alopecia     Cherry angioma     Skin cancer screening     Intertrigo     History of basal cell carcinoma     Malignant neoplasm of anal canal (H)     Aftercare following organ transplant     Age-related osteoporosis without current pathological fracture     Acute deep vein thrombosis (DVT) of proximal vein of lower extremity, unspecified laterality (H)     Past Medical History:   Diagnosis Date     Abnormal coagulation profile     p [id number] heterozygote      Age-related osteoporosis without current pathological fracture 6/22/2019     Anemia      Antiplatelet or antithrombotic long-term use      ASCUS with positive high risk HPV 2007, 2015    + HPV 56, 54,& 6, colp - TAL III, Leep =TAL II     Basal cell carcinoma      Hypertension      Immunosuppressed status (H)     due meds     Kidney replaced by transplant 9/04    Living donor recipient,  Rejection 7/2005     LSIL (low grade squamous intraepithelial lesion) on Pap smear 4/2013    +HPV 33 or 45, 61       PAD (peripheral artery disease) (H)      PONV (postoperative nausea and vomiting)      Squamous cell lung cancer (H)      Thrombosis of leg 1967     Unspecified disorder of kidney and ureter     X-linked dominant Alport's syndrome.     Past Surgical History:   Procedure Laterality Date     BIOPSY ANAL N/A 3/14/2018    Procedure: BIOPSY ANAL;;  Surgeon: Shabbir Leo MD;  [facility]     C NONSPECIFIC PROCEDURE      Thrombectomy     C NONSPECIFIC PROCEDURE  1955 and 1959    Bilater eye surgery - correction for crossed eyes     C NONSPECIFIC PROCEDURE  1998    oopherectomy L     C NONSPECIFIC PROCEDURE  1967    open kidney  biopsy - L     C TRANSPLANTATION OF KIDNEY  9/04    [facility]      COLONOSCOPY       COLONOSCOPY N/A 8/9/2017    Procedure: COMBINED COLONOSCOPY, SINGLE OR MULTIPLE BIOPSY/POLYPECTOMY BY BIOPSY;;  Surgeon: Sushil Hyatt MD;  Location:  GI     COLPOSCOPY,LOOP ELECTRD CERVIX EXCIS  03/11/08    TAL II     CONIZATION LEEP  7/17/2013    Procedure: CONIZATION LEEP;;  Surgeon: Liliana Renteria MD;  [facility]     CONIZATION LEEP N/A 8/17/2016    Procedure: CONIZATION LEEP;  Surgeon: Liliana Renteria MD;  [facility]     EXAM UNDER ANESTHESIA ANUS  7/15/2014    Procedure: EXAM UNDER ANESTHESIA ANUS;  Surgeon: Radha Musa MD;  [facility]     EXAM UNDER ANESTHESIA ANUS N/A 3/14/2018    Procedure: EXAM UNDER ANESTHESIA ANUS;  Anal Exam Under Anesthesia With Excision of anal lesion, proctoscopy;  Surgeon: Shabbir Leo MD;  Location:  OR     EYE SURGERY       LASER CO2 EXCISE VULVA WIDE LOCAL  7/15/2014    Procedure: LASER CO2 EXCISE VULVA WIDE LOCAL;  Surgeon: Liliana Renteria MD;  [facility]     LASER CO2 VAGINA  7/17/2013    Procedure: LASER CO2 VAGINA;;  Surgeon: Liliana Renteria MD;  [facility] OR     LASER CO2 VAGINA N/A 9/25/2018    Procedure: LASER CO2 VAGINA;  Exam Under Anesthesia, CO2 Laser Ablation of Upper Vagina and Cervix;  Surgeon: Pati Garcia MD;  [facility]     MICROSCOPY ANAL  7/17/2013    Procedure: MICROSCOPY ANAL;  Anal Microscopy,  EUA vagina,Colposcopy Of Vagina And Vulva, Vaginal Biopsies, Omniguide Co2 Laser To Vagina and vulva, Loop Electrosurgical Excision Procedure To Cervix;  Surgeon: Radha Musa MD;  Location: [facility] OR     MICROSCOPY ANAL  7/15/2014    Procedure: MICROSCOPY ANAL;  Surgeon: Radha Musa MD;  Location:  OR       Social History:  The patient does not work, looking for a job. The patient denies use of tanning beds.    Medications:  Current Outpatient Medications    Medication Sig Dispense Refill     ACETAMINOPHEN PO Take 1-2 tablets by mouth every 8 hours as needed for pain       amoxicillin (AMOXIL) 500 MG capsule Take 4 capsules (2,000 mg) by mouth once as needed Prior to dental procedures 16 capsule 3     atorvastatin (LIPITOR) 20 MG tablet TAKE ONE TABLET BY MOUTH EVERY DAY 90 tablet 1     calcium carbonate 600 mg-vitamin D 400 units (CALTRATE) 600-400 MG-UNIT per tablet Take 1 tablet by mouth 2 times daily       calcium citrate (CITRACAL) 950 MG tablet Take 1 tablet (950 mg) by mouth 2 times daily 60 tablet 3     cilostazol (PLETAL) 100 MG tablet TAKE ONE TABLET BY MOUTH TWICE A DAY 60 tablet 6     enoxaparin ANTICOAGULANT (LOVENOX) 60 MG/0.6ML syringe Inject 0.5 mLs (50 mg) Subcutaneous every 24 hours 30 Syringe 1     Lactobacillus-Inulin ([facility] DIGESTIVE [facility]) CAPS TAKE ONE CAPSULE BY MOUTH EVERY DAY 90 capsule 3     losartan (COZAAR) 50 MG tablet Take 1 tablet (50 mg) by mouth daily 90 tablet 1     metoprolol tartrate (LOPRESSOR) 100 MG tablet TAKE ONE TABLET BY MOUTH TWICE A  tablet 0     NIFEdipine ER OSMOTIC (PROCARDIA XL) 90 MG 24 hr tablet TAKE ONE TABLET BY MOUTH EVERY DAY 30 tablet 6     predniSONE (DELTASONE) 5 MG tablet TAKE ONE TABLET BY MOUTH EVERY DAY 90 tablet 3     sirolimus (GENERIC EQUIVALENT) 1 MG tablet Take 2 tablets (2 mg) by mouth daily 6 tablet 0     sirolimus (GENERIC EQUIVALENT) 1 MG tablet Take 2 tablets (2 mg) by mouth daily 180 tablet 3     COMPRESSION STOCKINGS Wear compression stockings on the right leg or both legs most time during the day and take them off at night. (Patient not taking: Reported on 9/2/2020) 2 each 2     Allergies   Allergen Reactions     Ultracet Nausea and Vomiting and Hives     Fentanyl Nausea     Has had since she initially had the issues with nausea and tolerated it OK.      Hydrocodone Nausea and Vomiting and Hives       Review of Systems:  -Skin Establ Pt: The patient denies any new rash, pruritus,  or lesions that are symptomatic, changing or bleeding, except as per HPI.  -Constitutional: The patient denies fatigue, fevers, chills, unintended weight loss, and night sweats. She is feeling generally well.    Physical exam:  Vitals: There were no vitals taken for this visit.  GEN: This is a well developed, well-nourished female in no acute distress, in a pleasant mood.    SKIN: Localized skin exam of the face, neck and right wrist was performed.   Significant for:   There is a firm well defined subcutaneous nodule on the right volar wrist.  There is an 8 mm pink scaly thin papule on the right upper forehead  There is a 9 mm tan variegated thin scaly papule on the right lateral cheek.     -No other lesions of concern on areas examined.     Impression/Plan:    1. Neoplasm of uncertain behavior right upper forehead and right lateral cheek, will perform shave biopsy to determine management,    Shave biopsy(performed by faculty): After discussion of benefits and risks including but not limited to bleeding, infection, scar, incomplete removal, recurrence, and non-diagnostic biopsy, written consent and photographs were obtained. Time-out was performed. The area was cleaned with isopropyl alcohol. 1 mL of 1% lidocaine with 1:100,000 epinephrine was injected to obtain adequate anesthesia of the lesion on the right upper forehead and right lateral cheek. A shave biopsy was performed. Hemostasis was achieved with aluminium chloride. Vaseline and a sterile dressing were applied. The patient tolerated the procedure and no complications were noted. The patient was provided with verbal and written post care instructions.     The right upper forehead returned as a squamous cell insitu carcinoma, extending to the lateral margin. Recommend Moh's surgery.    The right lateral cheek returned as a seborrheic keratosis.     2. Ganglion cyst, right volar wrist,    Because this is bothersome for the patient, will refer to orthopedic  surgery for potential excision/treatment.       Follow up in for a full skin check in 3-6 months  earlier for any new or changing lesions     Staff Involved:    All risks, benefits and alternatives were discussed with patient.  Patient is in agreement and understands the assessment and plan.  All questions were answered.  Sun Screen Education was given.   Return to Clinic in 3-6 months or sooner as needed.   Rosalie Pelletier PA-C

## 2020-09-28 NOTE — NURSING NOTE
Dermatology Rooming Note    Maribel Yang's goals for this visit include:   Chief Complaint   Patient presents with     Derm Problem     Maribel is here today for a spot on her face and a cyst on her wrist      LEEANN Yates

## 2020-09-30 LAB — COPATH REPORT: NORMAL

## 2020-10-02 ENCOUNTER — TELEPHONE (OUTPATIENT)
Dept: DERMATOLOGY | Facility: CLINIC | Age: 70
End: 2020-10-02

## 2020-10-02 ENCOUNTER — MYC MEDICAL ADVICE (OUTPATIENT)
Dept: DERMATOLOGY | Facility: CLINIC | Age: 70
End: 2020-10-02

## 2020-10-02 NOTE — TELEPHONE ENCOUNTER
I called the patient and she has had mohs at a different clinic in the past and requested not to have a consult. I asked the flow sheet questions and she is scheduled. I will send a my chart with the pre mohs information.     No further questions.     Perla WATSON CMA

## 2020-10-13 NOTE — TELEPHONE ENCOUNTER
FUTURE VISIT INFORMATION      FUTURE VISIT INFORMATION:    Date: 10.20.20    Time: 8:30    Location: [facility]  REFERRAL INFORMATION:    Referring provider:  Rosalie Pelletier    Referring providers clinic:  [facility]    Reason for visit/diagnosis  Mohs SCCinsitu right upper forehead (flowsheet done did not want consult)    RECORDS REQUESTED FROM:       Clinic name Comments Records Status Photos Status   [facility] 9.28.20- OV  Path # [id number] Epic Epic

## 2020-10-20 ENCOUNTER — OFFICE VISIT (OUTPATIENT)
Dept: DERMATOLOGY | Facility: CLINIC | Age: 70
End: 2020-10-20
Payer: COMMERCIAL

## 2020-10-20 ENCOUNTER — PRE VISIT (OUTPATIENT)
Dept: DERMATOLOGY | Facility: CLINIC | Age: 70
End: 2020-10-20

## 2020-10-20 VITALS — SYSTOLIC BLOOD PRESSURE: 123 MMHG | OXYGEN SATURATION: 97 % | HEART RATE: 71 BPM | DIASTOLIC BLOOD PRESSURE: 79 MMHG

## 2020-10-20 DIAGNOSIS — D48.5 NEOPLASM OF UNCERTAIN BEHAVIOR OF SKIN: ICD-10-CM

## 2020-10-20 DIAGNOSIS — D04.39 SQUAMOUS CELL CARCINOMA IN SITU OF SKIN OF FOREHEAD: Primary | ICD-10-CM

## 2020-10-20 PROCEDURE — 17311 MOHS 1 STAGE H/N/HF/G: CPT | Mod: GC | Performed by: DERMATOLOGY

## 2020-10-20 PROCEDURE — 13132 CMPLX RPR F/C/C/M/N/AX/G/H/F: CPT | Mod: GC | Performed by: DERMATOLOGY

## 2020-10-20 ASSESSMENT — PAIN SCALES - GENERAL: PAINLEVEL: NO PAIN (0)

## 2020-10-20 NOTE — PATIENT INSTRUCTIONS
Wound Care Instructions  I will experience scar, altered skin color, bleeding, swelling, pain, crusting and redness. I may experience altered sensation. Risks are excessive bleeding, infection, muscle weakness, thick (hypertrophic or keloidal) scar, and recurrence,. A second procedure may be recommended to obtain the best cosmetic or functional result.  Possible complications of any surgical procedure are bleeding, infection, scarring, alteration in skin color and sensation, muscle weakness in the area, wound dehiscence or seperation, or recurrence of the lesion or disease. On occasion, after healing, a secondary procedure or revision may be recommended in order to obtain the best cosmetic or functional result.   After your surgery, a pressure bandage will be placed over the area that has sutures. This will help prevent bleeding.   For the First 48 hours After Surgery:  1. Leave the pressure bandage on and keep it dry. If it should come loose, you may retape it, but do not take it off.  2. Relax and take it easy. Do not do any vigorous exercise, heavy lifting, or bending forward. This could cause the wound to bleed.  3. Post-operative pain is usually mild. You may take plain or extra strength Tylenol every 4 hours as needed (do not take more than 4,000mg in one day). Do not take any medicine that contains aspirin, ibuprofen or motrin unless you have been recommended these by a doctor.  Avoid alcohol and vitamin E as these may increase your tendency to bleed.  4. You may put an ice pack around the bandaged area for 20 minutes every 2-3 hours. This may help reduce swelling, bruising, and pain. Make sure the ice pack is waterproof so that the pressure bandage does not get wet.   5. You may see a small amount of drainage or blood on your pressure bandage. This is normal. However, if drainage or bleeding continues or saturates the bandage, you will need to apply firm pressure over the bandage with a washcloth for 15  minutes. If bleeding continues after applying pressure for 15 minutes then go to the nearest emergency room.  48 Hours After Surgery  Carefully remove the bandage and start daily wound care and dressing changes. You may also now shower and get the wound wet. Wash wound with a mild soap and water.  Use caution when washing the wound. Be gentle and do not let the forceful shower stream hit the wound directly.  PAT dry.  Daily Wound Care:  1. Wash wound with a mild soap and water.  Use caution when washing the wound, be gentle and do not let the forceful shower stream hit the wound directly.  2. PAT DRY.  3. Apply Vaseline (from a new container or tube) over the suture line with a Q-tip. It is very important to keep the wound continuously moist, as wounds heal best in a moist environment.  4.  Keep the site covered until sutures are removed, you can cover it with a Telfa (non-stick) dressing and tape or a band-aid.    5. If you are unable to keep wound covered, you must apply Vaseline every 2 - 3 hours (while awake) to ensure it is being kept moist for optimal healing. A dressing overnight is recommended to keep the area moist.   Call Us If:  1. You have pain that is not controlled with Tylenol.  2. You have signs or symptoms of an infection, such as: fever over 100 degrees F, redness, warmth, or foul-smelling or yellow/creamy drainage from the wound.  Who should I call with questions?    [facility]: [phone]     [facility]: [phone]    For urgent needs outside of business hours call the [facility] at [phone] and ask for the dermatology resident on call

## 2020-10-20 NOTE — PROGRESS NOTES
[facility] Mohs Dermatologic Surgery Procedure Note      Date of Service:  Oct 20, 2020  Surgery: Mohs micrographic surgery    Case 1  Repair Type: Complex linear  Repair Size: 4.0 cm  Suture Material: 4-0 Monocryl and 5-0 Fast absorbing gut  Tumor Type: SCCis  Location: Right upper forehead  Derm-Path Accession #: [id number]  PreOp Size: 0.9 x 0.8 cm  PostOp Size: 1.7 x 1.5 cm  Mohs Accession #:   Level of Defect: Fat      Procedure:  We discussed the principles of treatment and most likely complications including scarring, bleeding, infection, swelling, pain, crusting, nerve damage, large wound,  incomplete excision, wound dehiscence,  nerve damage, recurrence, and a second procedure may be recommended to obtain the best cosmetic or functional result.    Informed consent was obtained and the patient underwent the procedure as follows:  The patient was placed supine on the operating table.  The cancer was identified, outlined with a marker, and verified by the patient.  The entire surgical field was prepped with chlorhexidine.  The surgical site was anesthetized using 1% lidocaine with epinephrine.    The area of clinically apparent tumor was debulked. The layer of tissue was then surgically excised using a #15 blade and was then transferred onto a specimen sheet maintaining the orientation of the specimen. Hemostasis was obtained using electrocoagulation. The wound site was then covered with a dressing while the tissue samples were processed for examination.    The excised tissue was transported to the Mohs histology laboratory maintaining the tissue orientation.  The tissue specimen was relaxed so that the entire surgical margin was in a a single horizontal plane for sectioning and inked for precise mapping.  A precise reference map was drawn to reflect the sectioning of the specimen, colored inking of the margins, and orientation on the patient. The tissue was processed using horizontal  sectioning of the base and continuous peripheral margins.  The histopathologic sections were reviewed in conjunction with the reference map.    Total blocks: 1    Total slides:  2    There were no cancer cells visualized on examination, therefore Mohs surgery was complete.    REPAIR:     A complex layered linear closure was selected as the procedure which would maximally preserve both function and cosmesis and for the following reasons: 1) the defect was widely undermined and 2) wound size, depth, tension, and location.     After the excision of the tumor, the area was extensively and carefully undermined using blunt Metzenbaum scissors. Hemostasis was obtained with spot electrocautery and ligation of vessels where necessary. 4-0 Monocryl and 5-0 Fast absorbing gut    Estimated blood loss was less than 10 ml for all surgical sites. A sterile pressure dressing was applied and wound care instructions, with a written handout, were given. The patient was discharged from the Dermatologic Surgery Center alert and ambulatory.    Dr. Greg Weber was present for the entire Mohs surgery procedure and immediately available for the entire reconstruction and was physicially present for the key portions of the procedure.    Rob Fernandez MD  PGY-6    Micrographic Surgery and Dermatologic Oncology Fellow  October 20, 2020                      Pictures were placed in Pt's chart today for future reference.      Attending attestation:  I was present for key elements of the procedure and immediately available for all other portions of the procedure.  I have reviewed the note and edited it as necessary.    Greg Weber M.D.  Professor  Director of Dermatologic Surgery  Department of Dermatology  [facility]    Dermatology Surgery Clinic  [facility]  [address] [place] [address]

## 2020-10-20 NOTE — LETTER
10/20/2020       RE: Maribel Yang  [address]  [facility] [address]     Dear Colleague,    Thank you for referring your patient, Maribel Yang, to the [facility] DERMATOLOGIC SURGERY CLINIC [place] at [facility]. Please see a copy of my visit note below.    [facility] Mohs Dermatologic Surgery Procedure Note      Date of Service:  Oct 20, 2020  Surgery: Mohs micrographic surgery    Case 1  Repair Type: Complex linear  Repair Size: 4.0 cm  Suture Material: 4-0 Monocryl and 5-0 Fast absorbing gut  Tumor Type: SCCis  Location: Right upper forehead  Derm-Path Accession #: [id number]  PreOp Size: 0.9 x 0.8 cm  PostOp Size: 1.7 x 1.5 cm  Mohs Accession #:   Level of Defect: Fat      Procedure:  We discussed the principles of treatment and most likely complications including scarring, bleeding, infection, swelling, pain, crusting, nerve damage, large wound,  incomplete excision, wound dehiscence,  nerve damage, recurrence, and a second procedure may be recommended to obtain the best cosmetic or functional result.    Informed consent was obtained and the patient underwent the procedure as follows:  The patient was placed supine on the operating table.  The cancer was identified, outlined with a marker, and verified by the patient.  The entire surgical field was prepped with chlorhexidine.  The surgical site was anesthetized using 1% lidocaine with epinephrine.    The area of clinically apparent tumor was debulked. The layer of tissue was then surgically excised using a #15 blade and was then transferred onto a specimen sheet maintaining the orientation of the specimen. Hemostasis was obtained using electrocoagulation. The wound site was then covered with a dressing while the tissue samples were processed for examination.    The excised tissue was transported to the Mohs histology laboratory maintaining the tissue orientation.   The tissue specimen was relaxed so that the entire surgical margin was in a a single horizontal plane for sectioning and inked for precise mapping.  A precise reference map was drawn to reflect the sectioning of the specimen, colored inking of the margins, and orientation on the patient. The tissue was processed using horizontal sectioning of the base and continuous peripheral margins.  The histopathologic sections were reviewed in conjunction with the reference map.    Total blocks: 1    Total slides:  2    There were no cancer cells visualized on examination, therefore Mohs surgery was complete.    REPAIR:     A complex layered linear closure was selected as the procedure which would maximally preserve both function and cosmesis and for the following reasons: 1) the defect was widely undermined and 2) wound size, depth, tension, and location.     After the excision of the tumor, the area was extensively and carefully undermined using blunt Metzenbaum scissors. Hemostasis was obtained with spot electrocautery and ligation of vessels where necessary. 4-0 Monocryl and 5-0 Fast absorbing gut    Estimated blood loss was less than 10 ml for all surgical sites. A sterile pressure dressing was applied and wound care instructions, with a written handout, were given. The patient was discharged from the Dermatologic Surgery Center alert and ambulatory.    Dr. Greg Weber was present for the entire Mohs surgery procedure and immediately available for the entire reconstruction and was physicially present for the key portions of the procedure.    Rob Fernandez MD  PGY-6    Micrographic Surgery and Dermatologic Oncology Fellow  October 20, 2020                      Pictures were placed in Pt's chart today for future reference.      Attending attestation:  I was present for key elements of the procedure and immediately available for all other portions of the procedure.  I have reviewed the note and edited it as necessary.    Greg  ZAID Weber M.D.  Professor  Director of Dermatologic Surgery  Department of Dermatology  [facility]    Dermatology Surgery Clinic  [facility] [place] [id number]

## 2020-10-26 NOTE — TELEPHONE ENCOUNTER
DIAGNOSIS: Ganglion cyst of volar aspect of right wrist /Rosalie Pelletier PA-C/no images/[facility]/ortho con   APPOINTMENT DATE: 11.24.20   NOTES STATUS DETAILS   OFFICE NOTE from referring provider Internal [facility]ARI [facility]s [facility] Derm   OFFICE NOTE from other specialist N/A    DISCHARGE SUMMARY from hospital N/A    DISCHARGE REPORT from the ER N/A    OPERATIVE REPORT N/A    MEDICATION LIST Internal    EMG (for Spine) N/A    IMPLANT RECORD/STICKER N/A    LABS     CBC/DIFF N/A    CULTURES N/A    INJECTIONS DONE IN RADIOLOGY N/A    MRI N/A    CT SCAN N/A    XRAYS (IMAGES & REPORTS) N/A    TUMOR     PATHOLOGY  Slides & report N/A

## 2020-10-30 ENCOUNTER — DOCUMENTATION ONLY (OUTPATIENT)
Dept: CARE COORDINATION | Facility: CLINIC | Age: 70
End: 2020-10-30

## 2020-11-04 ENCOUNTER — VIRTUAL VISIT (OUTPATIENT)
Dept: DERMATOLOGY | Facility: CLINIC | Age: 70
End: 2020-11-04
Payer: COMMERCIAL

## 2020-11-04 DIAGNOSIS — Z86.007 HISTORY OF SQUAMOUS CELL CARCINOMA IN SITU (SCCIS): Primary | ICD-10-CM

## 2020-11-04 DIAGNOSIS — Z85.828 HISTORY OF MOHS MICROGRAPHIC SURGERY FOR SKIN CANCER: ICD-10-CM

## 2020-11-04 DIAGNOSIS — Z98.890 HISTORY OF MOHS MICROGRAPHIC SURGERY FOR SKIN CANCER: ICD-10-CM

## 2020-11-04 PROCEDURE — 99024 POSTOP FOLLOW-UP VISIT: CPT | Mod: 95 | Performed by: DERMATOLOGY

## 2020-11-04 ASSESSMENT — PAIN SCALES - GENERAL: PAINLEVEL: NO PAIN (0)

## 2020-11-04 NOTE — LETTER
"11/4/2020       RE: Maribel Yang  [address]  [facility] [address]     Dear Colleague,    Thank you for referring your patient, Maribel Yang, to the [facility] DERMATOLOGIC SURGERY CLINIC [place] at [facility]. Please see a copy of my visit note below.    [facility] Dermatology Record:  Store and Forward and Telephone [phone]      Dermatology Problem List:   1. Hx ESKD s/p rental transplant 2004  2. Hx NMSC  - BCC right lower leg.   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 Skin cancer screening, 12/12/18, focused exam 9/28/2020    Encounter Date: Nov 4, 2020    CC:   Chief Complaint   Patient presents with     Derm Problem     2 week follow up R forehead, concerned about \"crusted area\"        History of Present Illness:  Maribel Yang is a 69 year old female who presents for follow up after micrographic surgery for SCCis of the right upper forehead on 10/20/2020. The patient has no complaints today and feels the area is healing well. She denies pain, drainage, expanding redness, fevers or chills.     ROS: Patient is generally feeling well today.     Physical Examination:  Skin: Focused examination including photo of forehead at the time of biopsy was performed.   -Over the right forehead/anterior scalp is a healing, slightly raised, linear incision with expected post operative erythema. No evidence of infection or wound compromise.     Labs:  NA    Past Medical History:   Patient Active Problem List   Diagnosis     Other specified congenital anomalies     Kidney replaced by transplant     S/P LEEP of cervix     CARDIOVASCULAR SCREENING; LDL GOAL LESS THAN 100     Immunosuppression (H)     HTN, kidney transplant related     History of basal cell carcinoma     YUNIOR III (vulvar intraepithelial neoplasia III)     Peripheral artery " disease (H)     Squamous cell lung cancer (H)     Lumbago     Vaginal dysplasia     Alopecia     Cherry angioma     Skin cancer screening     Intertrigo     History of basal cell carcinoma     Malignant neoplasm of anal canal (H)     Aftercare following organ transplant     Age-related osteoporosis without current pathological fracture     Acute deep vein thrombosis (DVT) of proximal vein of lower extremity, unspecified laterality (H)     Past Medical History:   Diagnosis Date     Abnormal coagulation profile     p [id number]>A heterozygote      Age-related osteoporosis without current pathological fracture 6/22/2019     Anemia      Antiplatelet or antithrombotic long-term use      ASCUS with positive high risk HPV 2007, 2015    + HPV 56, 54,& 6, colp - TAL III, Leep =TAL II     Basal cell carcinoma      Hypertension      Immunosuppressed status (H)     due meds     Kidney replaced by transplant 9/04    Living donor recipient,  Rejection 7/2005     LSIL (low grade squamous intraepithelial lesion) on Pap smear 4/2013    +HPV 33 or 45, 61       PAD (peripheral artery disease) (H)      PONV (postoperative nausea and vomiting)      Squamous cell lung cancer (H)      Thrombosis of leg 1967     Unspecified disorder of kidney and ureter     X-linked dominant Alport's syndrome.     Past Surgical History:   Procedure Laterality Date     BIOPSY ANAL N/A 3/14/2018    Procedure: BIOPSY ANAL;;  Surgeon: Shabbir Leo MD;  Location: [facility] TRANSPLANTATION OF KIDNEY  9/04    recipient -- done at [facility]     COLONOSCOPY       COLONOSCOPY N/A 8/9/2017    Procedure: COMBINED COLONOSCOPY, SINGLE OR MULTIPLE BIOPSY/POLYPECTOMY BY BIOPSY;;  Surgeon: Sushil Hyatt MD;  Location:  GI     COLPOSCOPY,LOOP ELECTRD CERVIX EXCIS  03/11/08    TAL II     CONIZATION LEEP  7/17/2013    Procedure: CONIZATION LEEP;;  Surgeon: Liliana Renteria MD;  Location:  OR     CONIZATION LEEP N/A 8/17/2016    Procedure: CONIZATION LEEP;   Surgeon: Liliana Renteria MD;  Location: [facility] OR     EXAM UNDER ANESTHESIA ANUS  7/15/2014    Procedure: EXAM UNDER ANESTHESIA ANUS;  Surgeon: Radha Musa MD;  Location: [facility] OR     EXAM UNDER ANESTHESIA ANUS N/A 3/14/2018    Procedure: EXAM UNDER ANESTHESIA ANUS;  Anal Exam Under Anesthesia With Excision of anal lesion, proctoscopy;  Surgeon: Shabbir Leo MD;  Location: [facility] OR     EYE SURGERY       LASER CO2 EXCISE VULVA WIDE LOCAL  7/15/2014    Procedure: LASER CO2 EXCISE VULVA WIDE LOCAL;  Surgeon: Liliana Renteria MD;  Location: [facility] OR     LASER CO2 VAGINA  7/17/2013    Procedure: LASER CO2 VAGINA;;  Surgeon: Liliana Renteria MD;  Location: [facility] OR     LASER CO2 VAGINA N/A 9/25/2018    Procedure: LASER CO2 VAGINA;  Exam Under Anesthesia, CO2 Laser Ablation of Upper Vagina and Cervix;  Surgeon: Pati Garcia MD;  Location: [facility] OR     MICROSCOPY ANAL  7/17/2013    Procedure: MICROSCOPY ANAL;  Anal Microscopy,  EUA vagina,Colposcopy Of Vagina And Vulva, Vaginal Biopsies, Omniguide Co2 Laser To Vagina and vulva, Loop Electrosurgical Excision Procedure To Cervix;  Surgeon: Radha Musa MD;  Location: [facility] OR     MICROSCOPY ANAL  7/15/2014    Procedure: MICROSCOPY ANAL;  Surgeon: Radha Musa MD;  Location: [facility] OR     ZZC NONSPECIFIC PROCEDURE      Thrombectomy     ZZC NONSPECIFIC PROCEDURE  1955 and 1959    Bilater eye surgery - correction for crossed eyes     ZZC NONSPECIFIC PROCEDURE  1998    oopherectomy L     ZZC NONSPECIFIC PROCEDURE  1967    open kidney biopsy - L       Social History:  Patient reports that she has been smoking cigarettes. She started smoking about 53 years ago. She has a 10.50 pack-year smoking history. She has never used smokeless tobacco. She reports current alcohol use. She reports that she does not use drugs.    Family History:  Family History   Problem Relation Age of Onset     Diabetes Father         type 2 diag age,60's     Alcohol/Drug  Father      Arthritis Father      Hypertension Father      Lipids Father         high cholesterol     Arthritis Mother      Diabetes Mother      Depression Mother      Heart Disease Mother      Neurologic Disorder Mother      Obesity Mother      Psychotic Disorder Mother      Thyroid Disease Mother      Gynecology Sister         Precancerous cell removal from cervix at age 45     Depression Sister      Allergies Sister      Alcohol/Drug Sister      Neurologic Disorder Sister      Cerebrovascular Disease Paternal Grandmother          of a stroke in her 80's     Diabetes Paternal Grandmother      Alcohol/Drug Son      Colon Polyps Sister      Colon Cancer No family hx of      Crohn's Disease No family hx of      Ulcerative Colitis No family hx of      Melanoma No family hx of      Skin Cancer No family hx of        Medications:  Current Outpatient Medications   Medication     ACETAMINOPHEN PO     amoxicillin (AMOXIL) 500 MG capsule     atorvastatin (LIPITOR) 20 MG tablet     calcium carbonate 600 mg-vitamin D 400 units (CALTRATE) 600-400 MG-UNIT per tablet     cilostazol (PLETAL) 100 MG tablet     Lactobacillus-Inulin ([facility] DIGESTIVE [facility]) CAPS     losartan (COZAAR) 50 MG tablet     metoprolol tartrate (LOPRESSOR) 100 MG tablet     NIFEdipine ER OSMOTIC (PROCARDIA XL) 90 MG 24 hr tablet     predniSONE (DELTASONE) 5 MG tablet     sirolimus (GENERIC EQUIVALENT) 1 MG tablet     sirolimus (GENERIC EQUIVALENT) 1 MG tablet     calcium citrate (CITRACAL) 950 MG tablet     COMPRESSION STOCKINGS     enoxaparin ANTICOAGULANT (LOVENOX) 60 MG/0.6ML syringe     No current facility-administered medications for this visit.           Allergies   Allergen Reactions     Ultracet Nausea and Vomiting and Hives     Fentanyl Nausea     Has had since she initially had the issues with nausea and tolerated it OK.      Hydrocodone Nausea and Vomiting and Hives           Impression and Recommendations (Patient Counseled on the  Following):  1. History of SCCis of right upper forehead, s/p MMS on 10/20/2020  - The area is healing well without compromise  - Advised patient to continue to apply Vaseline several times daily  - Start scar massage in 2-4 weeks      Follow-up:   3 months for scar evaluation.      Staff and fellow    Rob Fernandez MD  PGY-6    Micrographic Surgery and Dermatologic Oncology Fellow  November 4, 2020    _____________________________________________________________________________    Teledermatology information:  - Location of patient: [place]  - Patient presented as: return  - Location of teledermatologist:  ([facility] [place] )  - Reason teledermatology is appropriate:  of National Emergency Regarding Coronavirus disease (COVID 19) Outbreak  - Image quality and interpretability: acceptable  - Physician has received verbal consent for a Video/Photos Visit from the patient? YES  - In-person dermatology visit recommendation: no  - Date of images: 11/4/2020  - Service start time: 1420  - Service end time:1430  - Date of report: 11/4/2020       Pt doing well after Mohs.  Pictures look great.    Greg Weber MD

## 2020-11-04 NOTE — PATIENT INSTRUCTIONS
[facility] Dermatology Visit    Thank you for allowing us to participate in your care. Your findings, instructions and follow-up plan are as follows:  Everything has healed well. You can stop wound care.    When should I call my doctor?    If you are worsening or not improving, please, contact us or seek urgent care as noted below.     Who should I call with questions (adults)?    [facility] (adult and pediatric): [phone]     [facility] (adult): [phone]    For urgent needs outside of business hours call the [facility] at [phone] and ask for the dermatology resident on call    If this is a medical emergency and you are unable to reach an ER, Call [phone]      Who should I call with questions (pediatric)?  [facility]- Pediatric Dermatology  Dr. Luz Marina Burnette, Dr. Nito Croft, Dr. Almita Wall, Rosalie Pelletier, PA  Dr. Kathya Wright, Dr. Beatriz Garcia & Dr. Jeffry Torres  Non Urgent  Nurse Triage Line; [phone]- Karissa and Sultana VARELA Care Coordinators   Zara (/Complex ) [phone]    If you need a prescription refill, please contact your pharmacy. Refills are approved or denied by our Physicians during normal business hours, Monday through Fridays  Per office policy, refills will not be granted if you have not been seen within the past year (or sooner depending on your child's condition)    Scheduling Information:  Pediatric Appointment Scheduling and Call Center [phone]  Radiology Scheduling- [phone]  Sedation Unit Scheduling- [phone]  [place] Scheduling- General [phone]; Pediatric Dermatology [phone]  Main  Services: [phone]  Bengali: [phone]  Cambodian: [phone]  Hmong/Maltese/Indonesian: [phone]  Preadmission Nursing Department Fax Number: [phone] (Fax all pre-operative paperwork to this  number)    For urgent matters arising during evenings, weekends, or holidays that cannot wait for normal business hours please call [phone] and ask for the Dermatology Resident On-Call to be paged.

## 2020-11-04 NOTE — NURSING NOTE
"Chief Complaint   Patient presents with     Derm Problem     2 week follow up R forehead, concerned about \"crusted area\"      Gabi Schmitt, EMT    "

## 2020-11-04 NOTE — PROGRESS NOTES
"mii Dermatology Record:  Store and Forward and Telephone [phone]      Dermatology Problem List:   1. Hx ESKD s/p rental transplant 2004  2. Hx NMSC  - BCC right lower leg.   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 Skin cancer screening, 12/12/18, focused exam 9/28/2020    Encounter Date: Nov 4, 2020    CC:   Chief Complaint   Patient presents with     Derm Problem     2 week follow up R forehead, concerned about \"crusted area\"        History of Present Illness:  Maribel Yang is a 69 year old female who presents for follow up after micrographic surgery for SCCis of the right upper forehead on 10/20/2020. The patient has no complaints today and feels the area is healing well. She denies pain, drainage, expanding redness, fevers or chills.     ROS: Patient is generally feeling well today.     Physical Examination:  Skin: Focused examination including photo of forehead at the time of biopsy was performed.   -Over the right forehead/anterior scalp is a healing, slightly raised, linear incision with expected post operative erythema. No evidence of infection or wound compromise.     Labs:  NA    Past Medical History:   Patient Active Problem List   Diagnosis     Other specified congenital anomalies     Kidney replaced by transplant     S/P LEEP of cervix     CARDIOVASCULAR SCREENING; LDL GOAL LESS THAN 100     Immunosuppression (H)     HTN, kidney transplant related     History of basal cell carcinoma     YUNIOR III (vulvar intraepithelial neoplasia III)     Peripheral artery disease (H)     Squamous cell lung cancer (H)     Lumbago     Vaginal dysplasia     Alopecia     Cherry angioma     Skin cancer screening     Intertrigo     History of basal cell carcinoma     Malignant neoplasm of anal canal (H)     Aftercare following organ transplant     Age-related osteoporosis without current pathological " fracture     Acute deep vein thrombosis (DVT) of proximal vein of lower extremity, unspecified laterality (H)     Past Medical History:   Diagnosis Date     Abnormal coagulation profile     p [id number] heterozygote      Age-related osteoporosis without current pathological fracture 6/22/2019     Anemia      Antiplatelet or antithrombotic long-term use      ASCUS with positive high risk HPV 2007, 2015    + HPV 56, 54,& 6, colp - TAL III, Leep =TAL II     Basal cell carcinoma      Hypertension      Immunosuppressed status (H)     due meds     Kidney replaced by transplant 9/04    Living donor recipient,  Rejection 7/2005     LSIL (low grade squamous intraepithelial lesion) on Pap smear 4/2013    +HPV 33 or 45, 61       PAD (peripheral artery disease) (H)      PONV (postoperative nausea and vomiting)      Squamous cell lung cancer (H)      Thrombosis of leg 1967     Unspecified disorder of kidney and ureter     X-linked dominant Alport's syndrome.     Past Surgical History:   Procedure Laterality Date     BIOPSY ANAL N/A 3/14/2018    Procedure: BIOPSY ANAL;;  Surgeon: Shabbir Leo MD;  Location:  OR      TRANSPLANTATION OF KIDNEY  9/04    recipient -- done at [facility]     COLONOSCOPY       COLONOSCOPY N/A 8/9/2017    Procedure: COMBINED COLONOSCOPY, SINGLE OR MULTIPLE BIOPSY/POLYPECTOMY BY BIOPSY;;  Surgeon: Sushil Hyatt MD;  Location:  GI     COLPOSCOPY,LOOP ELECTRD CERVIX EXCIS  03/11/08    TAL II     CONIZATION LEEP  7/17/2013    Procedure: CONIZATION LEEP;;  Surgeon: Liliana Renteria MD;  Location: [facility] OR     CONIZATION LEEP N/A 8/17/2016    Procedure: CONIZATION LEEP;  Surgeon: Liliana Renteria MD;  [facility]     EXAM UNDER ANESTHESIA ANUS  7/15/2014    Procedure: EXAM UNDER ANESTHESIA ANUS;  Surgeon: Radha Musa MD;  [facility]     EXAM UNDER ANESTHESIA ANUS N/A 3/14/2018    Procedure: EXAM UNDER ANESTHESIA ANUS;  Anal Exam Under Anesthesia With Excision of anal  lesion, proctoscopy;  Surgeon: Shabbir Leo MD;  [facility]     EYE SURGERY       LASER CO2 EXCISE VULVA WIDE LOCAL  7/15/2014    Procedure: LASER CO2 EXCISE VULVA WIDE LOCAL;  Surgeon: Liliana Renteria MD;  [facility]     LASER CO2 VAGINA  7/17/2013    Procedure: LASER CO2 VAGINA;;  Surgeon: Liliana Renteria MD;  [facility]     LASER CO2 VAGINA N/A 9/25/2018    Procedure: LASER CO2 VAGINA;  Exam Under Anesthesia, CO2 Laser Ablation of Upper Vagina and Cervix;  Surgeon: Pati Garcia MD;  [facility]     MICROSCOPY ANAL  7/17/2013    Procedure: MICROSCOPY ANAL;  Anal Microscopy,  EUA vagina,Colposcopy Of Vagina And Vulva, Vaginal Biopsies, Omniguide Co2 Laser To Vagina and vulva, Loop Electrosurgical Excision Procedure To Cervix;  Surgeon: Radha Musa MD;  [facility]     MICROSCOPY ANAL  7/15/2014    Procedure: MICROSCOPY ANAL;  Surgeon: Radha Musa MD;  [facility]     ZZC NONSPECIFIC PROCEDURE      Thrombectomy     ZZC NONSPECIFIC PROCEDURE  1955 and 1959    Bilater eye surgery - correction for crossed eyes     ZZC NONSPECIFIC PROCEDURE  1998    oopherectomy L     ZZC NONSPECIFIC PROCEDURE  1967    open kidney biopsy - L       Social History:  Patient reports that she has been smoking cigarettes. She started smoking about 53 years ago. She has a 10.50 pack-year smoking history. She has never used smokeless tobacco. She reports current alcohol use. She reports that she does not use drugs.    Family History:  Family History   Problem Relation Age of Onset     Diabetes Father         type 2 diag age,60's     Alcohol/Drug Father      Arthritis Father      Hypertension Father      Lipids Father         high cholesterol     Arthritis Mother      Diabetes Mother      Depression Mother      Heart Disease Mother      Neurologic Disorder Mother      Obesity Mother      Psychotic Disorder Mother      Thyroid Disease Mother      Gynecology Sister          Precancerous cell removal from cervix at age 45     Depression Sister      Allergies Sister      Alcohol/Drug Sister      Neurologic Disorder Sister      Cerebrovascular Disease Paternal Grandmother          of a stroke in her 80's     Diabetes Paternal Grandmother      Alcohol/Drug Son      Colon Polyps Sister      Colon Cancer No family hx of      Crohn's Disease No family hx of      Ulcerative Colitis No family hx of      Melanoma No family hx of      Skin Cancer No family hx of        Medications:  Current Outpatient Medications   Medication     ACETAMINOPHEN PO     amoxicillin (AMOXIL) 500 MG capsule     atorvastatin (LIPITOR) 20 MG tablet     calcium carbonate 600 mg-vitamin D 400 units (CALTRATE) 600-400 MG-UNIT per tablet     cilostazol (PLETAL) 100 MG tablet     Lactobacillus-Inulin ([facility] DIGESTIVE [facility]) CAPS     losartan (COZAAR) 50 MG tablet     metoprolol tartrate (LOPRESSOR) 100 MG tablet     NIFEdipine ER OSMOTIC (PROCARDIA XL) 90 MG 24 hr tablet     predniSONE (DELTASONE) 5 MG tablet     sirolimus (GENERIC EQUIVALENT) 1 MG tablet     sirolimus (GENERIC EQUIVALENT) 1 MG tablet     calcium citrate (CITRACAL) 950 MG tablet     COMPRESSION STOCKINGS     enoxaparin ANTICOAGULANT (LOVENOX) 60 MG/0.6ML syringe     No current facility-administered medications for this visit.           Allergies   Allergen Reactions     Ultracet Nausea and Vomiting and Hives     Fentanyl Nausea     Has had since she initially had the issues with nausea and tolerated it OK.      Hydrocodone Nausea and Vomiting and Hives           Impression and Recommendations (Patient Counseled on the Following):  1. History of SCCis of right upper forehead, s/p MMS on 10/20/2020  - The area is healing well without compromise  - Advised patient to continue to apply Vaseline several times daily  - Start scar massage in 2-4 weeks      Follow-up:   3 months for scar evaluation.      Staff and fellow    Rob Fernandez MD  PGY-6     Micrographic Surgery and Dermatologic Oncology Fellow  November 4, 2020    _____________________________________________________________________________    Teledermatology information:  - Location of patient: [place]  - Patient presented as: return  - Location of teledermatologist:  ([facility] [place] )  - Reason teledermatology is appropriate:  of National Emergency Regarding Coronavirus disease (COVID 19) Outbreak  - Image quality and interpretability: acceptable  - Physician has received verbal consent for a Video/Photos Visit from the patient? YES  - In-person dermatology visit recommendation: no  - Date of images: 11/4/2020  - Service start time: 1420  - Service end time:1430  - Date of report: 11/4/2020       Pt doing well after Mohs.  Pictures look great.    Greg Weber MD

## 2020-11-17 DIAGNOSIS — I15.1 HYPERTENSION SECONDARY TO OTHER RENAL DISORDERS: ICD-10-CM

## 2020-11-17 DIAGNOSIS — I73.9 PERIPHERAL ARTERY DISEASE (H): ICD-10-CM

## 2020-11-18 NOTE — TELEPHONE ENCOUNTER
Nifedipine and Cilostazol:    One month supply for both sent 10/19/2020  Due for appointment  Last visit (virtual) 5/29/2020    Stitch message sent to patient asking her to schedule an appointment.  Monse Hopson RN

## 2020-11-19 DIAGNOSIS — M67.431 GANGLION CYST OF VOLAR ASPECT OF RIGHT WRIST: Primary | ICD-10-CM

## 2020-11-20 RX ORDER — NIFEDIPINE 90 MG/1
TABLET, EXTENDED RELEASE ORAL
Qty: 30 TABLET | Refills: 0 | Status: SHIPPED | OUTPATIENT
Start: 2020-11-20 | End: 2020-12-11

## 2020-11-20 RX ORDER — CILOSTAZOL 100 MG/1
TABLET ORAL
Qty: 60 TABLET | Refills: 0 | Status: SHIPPED | OUTPATIENT
Start: 2020-11-20 | End: 2020-12-11

## 2020-11-20 NOTE — TELEPHONE ENCOUNTER
Medication is being filled for 1 time refill only due to:  Patient needs to be seen because due for f/u.     Scheduled patient for video visit with PN 12/11.  Monse Hopson RN

## 2020-11-24 ENCOUNTER — PRE VISIT (OUTPATIENT)
Dept: ORTHOPEDICS | Facility: CLINIC | Age: 70
End: 2020-11-24

## 2020-12-11 ENCOUNTER — VIRTUAL VISIT (OUTPATIENT)
Dept: FAMILY MEDICINE | Facility: CLINIC | Age: 70
End: 2020-12-11
Payer: COMMERCIAL

## 2020-12-11 DIAGNOSIS — D84.9 IMMUNOSUPPRESSION (H): ICD-10-CM

## 2020-12-11 DIAGNOSIS — E78.00 HYPERCHOLESTEREMIA: ICD-10-CM

## 2020-12-11 DIAGNOSIS — Z94.0 KIDNEY REPLACED BY TRANSPLANT: ICD-10-CM

## 2020-12-11 DIAGNOSIS — I15.1 HYPERTENSION SECONDARY TO OTHER RENAL DISORDERS: ICD-10-CM

## 2020-12-11 DIAGNOSIS — C21.1 MALIGNANT NEOPLASM OF ANAL CANAL (H): Primary | ICD-10-CM

## 2020-12-11 DIAGNOSIS — I12.9 RENAL HYPERTENSION, STAGE 1-4 OR UNSPECIFIED CHRONIC KIDNEY DISEASE: ICD-10-CM

## 2020-12-11 DIAGNOSIS — I73.9 PERIPHERAL ARTERY DISEASE (H): ICD-10-CM

## 2020-12-11 PROCEDURE — 99214 OFFICE O/P EST MOD 30 MIN: CPT | Mod: 95 | Performed by: FAMILY MEDICINE

## 2020-12-11 RX ORDER — NIFEDIPINE 90 MG/1
TABLET, EXTENDED RELEASE ORAL
Qty: 90 TABLET | Refills: 1 | Status: ON HOLD | OUTPATIENT
Start: 2020-12-11 | End: 2021-04-10

## 2020-12-11 RX ORDER — METOPROLOL TARTRATE 100 MG
100 TABLET ORAL 2 TIMES DAILY
Qty: 180 TABLET | Refills: 1 | Status: ON HOLD | OUTPATIENT
Start: 2020-12-11 | End: 2021-05-22

## 2020-12-11 RX ORDER — LOSARTAN POTASSIUM 50 MG/1
50 TABLET ORAL DAILY
Qty: 90 TABLET | Refills: 1 | Status: SHIPPED | OUTPATIENT
Start: 2020-12-11 | End: 2021-04-15

## 2020-12-11 RX ORDER — CILOSTAZOL 100 MG/1
TABLET ORAL
Qty: 180 TABLET | Refills: 3 | Status: ON HOLD | OUTPATIENT
Start: 2020-12-11 | End: 2021-04-09

## 2020-12-11 RX ORDER — ATORVASTATIN CALCIUM 20 MG/1
20 TABLET, FILM COATED ORAL DAILY
Qty: 90 TABLET | Refills: 1 | Status: ON HOLD | OUTPATIENT
Start: 2020-12-11 | End: 2021-04-09

## 2020-12-11 NOTE — PROGRESS NOTES
"Maribel Yang is a 69 year old female who is being evaluated via a billable video visit.      The patient has been notified of following:     \"This video visit will be conducted via a call between you and your physician/provider. We have found that certain health care needs can be provided without the need for an in-person physical exam.  This service lets us provide the care you need with a video conversation.  If a prescription is necessary we can send it directly to your pharmacy.  If lab work is needed we can place an order for that and you can then stop by our lab to have the test done at a later time.    Video visits are billed at different rates depending on your insurance coverage.  Please reach out to your insurance provider with any questions.    If during the course of the call the physician/provider feels a video visit is not appropriate, you will not be charged for this service.\"    Patient has given verbal consent for Video visit? Yes  How would you like to obtain your AVS? MyChart  If you are dropped from the video visit, the video invite should be resent to: MyChart  Will anyone else be joining your video visit? No     Subjective     Maribel Yang is a 69 year old female who presents today via video visit for the following health issues:    HPI     Hypertension Follow-up      Do you check your blood pressure regularly outside of the clinic? Yes     Are you following a low salt diet? No    Are your blood pressures ever more than 140 on the top number (systolic) OR more   than 90 on the bottom number (diastolic), for example 140/90? No      How many servings of fruits and vegetables do you eat daily?  4 or more    On average, how many sweetened beverages do you drink each day (Examples: soda, juice, sweet tea, etc.  Do NOT count diet or artificially sweetened beverages)?   0    How many days per week do you exercise enough to make your heart beat faster? 3 or less    How many minutes a " day do you exercise enough to make your heart beat faster? 9 or less    How many days per week do you miss taking your medication? 0    Medication Followup of nifedipine, cilostazol    Taking Medication as prescribed: yes    Side Effects:  Diarrhea - ongoing SE unsure which med    Medication Helping Symptoms:  yes     Had stomach bug this week which lasted few days but     Has not been in to clinics -   Did have follow-up with colorectal and oncology - notes reviewed  She also had a skin cancer removed from her forehead - healing from that    BP readings at home in the 120/70's  Tolerating meds without any new side effects -   Does have diarrhea but improves with fiber          Video Start Time: 2:32pm      Review of Systems   Constitutional, HEENT, cardiovascular, pulmonary, GI, , musculoskeletal, neuro, skin, endocrine and psych systems are negative, except as otherwise noted.      Objective    Vitals - Patient Reported  Systolic (Patient Reported): 127  Diastolic (Patient Reported): 77      Vitals:  No vitals were obtained today due to virtual visit.    Physical Exam     GENERAL: Healthy, alert and no distress  EYES: Eyes grossly normal to inspection.  No discharge or erythema, or obvious scleral/conjunctival abnormalities.  RESP: No audible wheeze, cough, or visible cyanosis.  No visible retractions or increased work of breathing.    SKIN: Visible skin clear. No significant rash, abnormal pigmentation or lesions.  NEURO: Cranial nerves grossly intact.  Mentation and speech appropriate for age.  PSYCH: Mentation appears normal, affect normal/bright, judgement and insight intact, normal speech and appearance well-groomed.            Assessment & Plan     Peripheral artery disease (H)     - cilostazol (PLETAL) 100 MG tablet; TAKE ONE TABLET BY MOUTH TWICE A DAY    Hypercholesteremia  Due for lipid recheck - ordered for February when she goes in for other labs   - atorvastatin (LIPITOR) 20 MG tablet; Take 1 tablet  (20 mg) by mouth daily  - Lipid panel reflex to direct LDL Fasting; Future    Hypertension secondary to other renal disorders  Well controlled on med s- will refill for 6 months -   - NIFEdipine ER OSMOTIC (PROCARDIA XL) 90 MG 24 hr tablet; TAKE ONE TABLET BY MOUTH EVERY DAY  - metoprolol tartrate (LOPRESSOR) 100 MG tablet; Take 1 tablet (100 mg) by mouth 2 times daily    Renal hypertension, stage 1-4 or unspecified chronic kidney disease  As above   - losartan (COZAAR) 50 MG tablet; Take 1 tablet (50 mg) by mouth daily    Malignant neoplasm of anal canal (H)  Working with oncology -     Kidney replaced by transplant   has nephrologist -     Immunosuppression (H)           Return in about 6 months (around 6/11/2021) for BP Recheck.    Lisa Martinez DO  [facility]      Video-Visit Details    Type of service:  Video Visit    Video End Time:2:51pm    Originating Location (pt. Location): Home    Distant Location (provider location):  [facility]     Platform used for Video Visit: Mikaela

## 2021-01-15 ENCOUNTER — HEALTH MAINTENANCE LETTER (OUTPATIENT)
Age: 71
End: 2021-01-15

## 2021-02-09 ENCOUNTER — VIRTUAL VISIT (OUTPATIENT)
Dept: FAMILY MEDICINE | Facility: CLINIC | Age: 71
End: 2021-02-09
Payer: COMMERCIAL

## 2021-02-09 DIAGNOSIS — D84.9 IMMUNOSUPPRESSION (H): ICD-10-CM

## 2021-02-09 DIAGNOSIS — B01.9 VARICELLA WITHOUT COMPLICATION: Primary | ICD-10-CM

## 2021-02-09 PROCEDURE — 99213 OFFICE O/P EST LOW 20 MIN: CPT | Mod: 95 | Performed by: PHYSICIAN ASSISTANT

## 2021-02-09 RX ORDER — VALACYCLOVIR HYDROCHLORIDE 500 MG/1
500 TABLET, FILM COATED ORAL DAILY
Qty: 7 TABLET | Refills: 0 | Status: SHIPPED | OUTPATIENT
Start: 2021-02-09 | End: 2021-04-01

## 2021-02-09 NOTE — PATIENT INSTRUCTIONS
We are working hard to begin vaccinating more people against COVID-19. Currently, we are only vaccinating Phase 1a workers - healthcare workers who are unable to do their job remotely. Vaccine availability is very limited.      If you are a healthcare worker and you are unable to do your job remotely, please log in to [employer] using this link to see if we have openings and schedule an appointment. At your vaccine appointment, you will be asked to provide proof of employment as a health care worker. If you cannot, you will be turned away.     Vaccine appointments are being added as they become available. Please check your [employer] account frequently for availability.  If you have technical difficulty using [employer], call [phone] for assistance.     You can learn more about the state's phased approach to administering the vaccine, with details on each phase, here.      Phase 1b is the next group that will get vaccinated and includes frontline essential workers and adults 75 years of age and older. When we are able to start vaccinating this group, we will share that information on our website. Check this website to stay up to date on COVID-19 vaccination information.        Did you know?      You can schedule a video visit for follow-up appointments as well as future appointments for certain conditions.  Please see the below link.     [email or web address]    If you have not already done so,  I encourage you to sign up for [employer] ([email or web address]).  This will allow you to review your results, securely communicate with a provider, and schedule virtual visits as well.

## 2021-02-09 NOTE — PROGRESS NOTES
Maribel is a 70 year old who is being evaluated via a billable video visit.      How would you like to obtain your AVS? ViaBilltoshiaPressglue  If the video visit is dropped, the invitation should be resent by: Tuan  Will anyone else be joining your video visit? No      Video Start Time: 3:07 PM    Assessment & Plan     Varicella without complication  Will presumptively treat pending ANY new lesions or worsening of symptoms over the next 12 hours. If symptoms continue to improve ok to hold off on antiviral; if started make sure staying hydrated.  - valACYclovir (VALTREX) 500 MG tablet; Take 1 tablet (500 mg) by mouth daily for 7 days    Immunosuppression (H)  Presumptive treatment pending above due to high risk patient. Caution with antiviral use due to kidney status though as well.    Patient discussed with PCP, Dr. Martinez, at length as well.      Review of the result(s) of each unique test - recent labs  Independent interpretation of a test performed by another physician/other qualified health care professional (not separately reported) - PCP Dr. Martinez  20 minutes spent on the date of the encounter doing chart review, history and exam, documentation and further activities as noted above       Tobacco Cessation:   reports that she has been smoking cigarettes. She started smoking about 54 years ago. She has a 10.50 pack-year smoking history. She has never used smokeless tobacco.      Patient Instructions       We are working hard to begin vaccinating more people against COVID-19. Currently, we are only vaccinating Phase 1a workers - healthcare workers who are unable to do their job remotely. Vaccine availability is very limited.      If you are a healthcare worker and you are unable to do your job remotely, please log in to [employer] using this link to see if we have openings and schedule an appointment. At your vaccine appointment, you will be asked to provide proof of employment as a health care worker. If you cannot, you will be  turned away.     Vaccine appointments are being added as they become available. Please check your [employer] account frequently for availability.  If you have technical difficulty using [employer], call [phone] for assistance.     You can learn more about the state's phased approach to administering the vaccine, with details on each phase, here.      Phase 1b is the next group that will get vaccinated and includes frontline essential workers and adults 75 years of age and older. When we are able to start vaccinating this group, we will share that information on our website. Check this website to stay up to date on COVID-19 vaccination information.        Did you know?      You can schedule a video visit for follow-up appointments as well as future appointments for certain conditions.  Please see the below link.     [email or web address]    If you have not already done so,  I encourage you to sign up for [employer] ([email or web address]).  This will allow you to review your results, securely communicate with a provider, and schedule virtual visits as well.      Return in about 6 months (around 8/9/2021) for Routine Visit, or sooner with worsening symptoms.    MADHURI Ngo [facility]   Maribel is a 70 year old who presents for the following health issues     HPI       Rash  Onset/Duration: x4 days   Description  Location: torso then spread to back, seemed like chicken pox to patient.  Character: round, draining, red  Itching: moderate  Intensity:  mild  Progression of Symptoms:  same  Accompanying signs and symptoms:   Fever: no  Body aches or joint pain: no  Sore throat symptoms: YES  Recent cold symptoms: YES - headache  History:           Previous episodes of similar rash: pt has had chicken pox and shingles before  New exposures:  None  Recent travel: no  Exposure to similar rash: no  Precipitating or alleviating factors:  "none  Therapies tried and outcome: calamine lotion, tylenol (this am only) - helpful     Does not have \"classic\" shingles distribution.  Temperature and BP ok this am.  Sore throat a little this am only; slightly swollen lymph node along left side of neck  Rash seems smaller/starting to scab/crust already this morning. NO new lesions in the past 24 hours.      Review of Systems   Constitutional, HEENT, cardiovascular, pulmonary, GI, , musculoskeletal, neuro, skin, endocrine and psych systems are negative, except as otherwise noted.      Objective           Vitals:  No vitals were obtained today due to virtual visit.    Physical Exam   GENERAL: Healthy, alert and no distress  EYES: Eyes grossly normal to inspection.  No discharge or erythema, or obvious scleral/conjunctival abnormalities.  RESP: No audible wheeze, cough, or visible cyanosis.  No visible retractions or increased work of breathing.    SKIN: Visible skin clear. No significant rash, abnormal pigmentation or lesions. Unable to fully visualize active lesions, but distribution crosses midline per patient.  NEURO: Cranial nerves grossly intact.  Mentation and speech appropriate for age.  PSYCH: Mentation appears normal, affect normal/bright, judgement and insight intact, normal speech and appearance well-groomed.            Video-Visit Details    Type of service:  Video Visit    Video End Time:3:27 PM    Originating Location (pt. Location): Home    Distant Location (provider location):  [facility]     Platform used for Video Visit: Mikaela"

## 2021-02-17 DIAGNOSIS — I82.4Y1 ACUTE DEEP VEIN THROMBOSIS (DVT) OF PROXIMAL VEIN OF RIGHT LOWER EXTREMITY (H): ICD-10-CM

## 2021-02-17 DIAGNOSIS — Z79.01 CHRONIC ANTICOAGULATION: Primary | ICD-10-CM

## 2021-02-17 DIAGNOSIS — Z79.01 ANTICOAGULATED: ICD-10-CM

## 2021-02-17 DIAGNOSIS — R19.7 DIARRHEA, UNSPECIFIED TYPE: ICD-10-CM

## 2021-02-18 NOTE — TELEPHONE ENCOUNTER
DEBBIE Renner      Last Written Prescription Date:  2/18/20  Last Fill Quantity: 90,   # refills: 3  Last Office Visit: 2/9/21 YARELI with Andrea  Future Office visit:       Routing refill request to provider for review/approval because:  Drug not on the [facility], [facility] or [facility] refill protocol or controlled substance    Please approve if appropriate  Aura Stiles RN

## 2021-02-19 RX ORDER — LACTOBACILLUS RHAMNOSUS GG 10B CELL
CAPSULE ORAL
Qty: 90 CAPSULE | Refills: 3 | Status: ON HOLD | OUTPATIENT
Start: 2021-02-19 | End: 2021-05-22

## 2021-02-22 DIAGNOSIS — Z79.01 ANTICOAGULATED: ICD-10-CM

## 2021-02-22 DIAGNOSIS — Z79.01 CHRONIC ANTICOAGULATION: ICD-10-CM

## 2021-02-22 DIAGNOSIS — I82.4Y1 ACUTE DEEP VEIN THROMBOSIS (DVT) OF PROXIMAL VEIN OF RIGHT LOWER EXTREMITY (H): ICD-10-CM

## 2021-03-01 ENCOUNTER — ANCILLARY PROCEDURE (OUTPATIENT)
Dept: CT IMAGING | Facility: CLINIC | Age: 71
End: 2021-03-01
Attending: PHYSICIAN ASSISTANT
Payer: COMMERCIAL

## 2021-03-01 DIAGNOSIS — E78.00 HYPERCHOLESTEREMIA: ICD-10-CM

## 2021-03-01 DIAGNOSIS — D47.2 MGUS (MONOCLONAL GAMMOPATHY OF UNKNOWN SIGNIFICANCE): ICD-10-CM

## 2021-03-01 DIAGNOSIS — C21.1 MALIGNANT NEOPLASM OF ANAL CANAL (H): ICD-10-CM

## 2021-03-01 LAB
ALBUMIN SERPL-MCNC: 3.2 G/DL (ref 3.4–5)
ALP SERPL-CCNC: 132 U/L (ref 40–150)
ALT SERPL W P-5'-P-CCNC: 21 U/L (ref 0–50)
ANION GAP SERPL CALCULATED.3IONS-SCNC: 5 MMOL/L (ref 3–14)
AST SERPL W P-5'-P-CCNC: 17 U/L (ref 0–45)
BASOPHILS # BLD AUTO: 0 10E9/L (ref 0–0.2)
BASOPHILS NFR BLD AUTO: 0.5 %
BILIRUB SERPL-MCNC: 0.3 MG/DL (ref 0.2–1.3)
BUN SERPL-MCNC: 22 MG/DL (ref 7–30)
CALCIUM SERPL-MCNC: 9.4 MG/DL (ref 8.5–10.1)
CHLORIDE SERPL-SCNC: 107 MMOL/L (ref 94–109)
CO2 SERPL-SCNC: 28 MMOL/L (ref 20–32)
CREAT SERPL-MCNC: 1.38 MG/DL (ref 0.52–1.04)
DIFFERENTIAL METHOD BLD: ABNORMAL
EOSINOPHIL # BLD AUTO: 0.1 10E9/L (ref 0–0.7)
EOSINOPHIL NFR BLD AUTO: 1.2 %
ERYTHROCYTE [DISTWIDTH] IN BLOOD BY AUTOMATED COUNT: 13.7 % (ref 10–15)
GFR SERPL CREATININE-BSD FRML MDRD: 39 ML/MIN/{1.73_M2}
GLUCOSE SERPL-MCNC: 101 MG/DL (ref 70–99)
HCT VFR BLD AUTO: 40.4 % (ref 35–47)
HGB BLD-MCNC: 13 G/DL (ref 11.7–15.7)
IMM GRANULOCYTES # BLD: 0 10E9/L (ref 0–0.4)
IMM GRANULOCYTES NFR BLD: 0.3 %
LYMPHOCYTES # BLD AUTO: 0.4 10E9/L (ref 0.8–5.3)
LYMPHOCYTES NFR BLD AUTO: 7.5 %
MCH RBC QN AUTO: 29 PG (ref 26.5–33)
MCHC RBC AUTO-ENTMCNC: 32.2 G/DL (ref 31.5–36.5)
MCV RBC AUTO: 90 FL (ref 78–100)
MONOCYTES # BLD AUTO: 0.4 10E9/L (ref 0–1.3)
MONOCYTES NFR BLD AUTO: 7.2 %
NEUTROPHILS # BLD AUTO: 4.9 10E9/L (ref 1.6–8.3)
NEUTROPHILS NFR BLD AUTO: 83.3 %
NRBC # BLD AUTO: 0 10*3/UL
NRBC BLD AUTO-RTO: 0 /100
PLATELET # BLD AUTO: 287 10E9/L (ref 150–450)
POTASSIUM SERPL-SCNC: 4.5 MMOL/L (ref 3.4–5.3)
PROT SERPL-MCNC: 7.4 G/DL (ref 6.8–8.8)
RBC # BLD AUTO: 4.48 10E12/L (ref 3.8–5.2)
SODIUM SERPL-SCNC: 139 MMOL/L (ref 133–144)
WBC # BLD AUTO: 5.8 10E9/L (ref 4–11)

## 2021-03-01 PROCEDURE — 85025 COMPLETE CBC W/AUTO DIFF WBC: CPT | Performed by: PATHOLOGY

## 2021-03-01 PROCEDURE — 80061 LIPID PANEL: CPT | Performed by: PATHOLOGY

## 2021-03-01 PROCEDURE — 71250 CT THORAX DX C-: CPT | Performed by: RADIOLOGY

## 2021-03-01 PROCEDURE — 83883 ASSAY NEPHELOMETRY NOT SPEC: CPT | Mod: 90 | Performed by: PATHOLOGY

## 2021-03-01 PROCEDURE — 82784 ASSAY IGA/IGD/IGG/IGM EACH: CPT | Mod: 90 | Performed by: PATHOLOGY

## 2021-03-01 PROCEDURE — 99000 SPECIMEN HANDLING OFFICE-LAB: CPT | Performed by: PATHOLOGY

## 2021-03-01 PROCEDURE — 36415 COLL VENOUS BLD VENIPUNCTURE: CPT | Performed by: PATHOLOGY

## 2021-03-01 PROCEDURE — 99N1036 PR STATISTIC TOTAL PROTEIN: Mod: 90 | Performed by: PATHOLOGY

## 2021-03-01 PROCEDURE — 84165 PROTEIN E-PHORESIS SERUM: CPT | Mod: 90 | Performed by: PATHOLOGY

## 2021-03-01 PROCEDURE — 74176 CT ABD & PELVIS W/O CONTRAST: CPT | Performed by: RADIOLOGY

## 2021-03-01 PROCEDURE — 80053 COMPREHEN METABOLIC PANEL: CPT | Performed by: PATHOLOGY

## 2021-03-02 DIAGNOSIS — I71.40 AAA (ABDOMINAL AORTIC ANEURYSM) (H): Primary | ICD-10-CM

## 2021-03-02 DIAGNOSIS — I71.20 ANEURYSM OF THORACIC AORTA (H): ICD-10-CM

## 2021-03-02 LAB
ALBUMIN SERPL ELPH-MCNC: 3.6 G/DL (ref 3.7–5.1)
ALPHA1 GLOB SERPL ELPH-MCNC: 0.5 G/DL (ref 0.2–0.4)
ALPHA2 GLOB SERPL ELPH-MCNC: 1.1 G/DL (ref 0.5–0.9)
B-GLOBULIN SERPL ELPH-MCNC: 0.7 G/DL (ref 0.6–1)
CHOLEST SERPL-MCNC: 153 MG/DL
GAMMA GLOB SERPL ELPH-MCNC: 1.1 G/DL (ref 0.7–1.6)
HDLC SERPL-MCNC: 60 MG/DL
KAPPA LC UR-MCNC: 3.25 MG/DL (ref 0.33–1.94)
KAPPA LC/LAMBDA SER: 1.29 {RATIO} (ref 0.26–1.65)
LAMBDA LC SERPL-MCNC: 2.52 MG/DL (ref 0.57–2.63)
LDLC SERPL CALC-MCNC: 61 MG/DL
M PROTEIN SERPL ELPH-MCNC: 0.1 G/DL
NONHDLC SERPL-MCNC: 92 MG/DL
PROT PATTERN SERPL ELPH-IMP: ABNORMAL
TRIGL SERPL-MCNC: 154 MG/DL

## 2021-03-04 ENCOUNTER — VIRTUAL VISIT (OUTPATIENT)
Dept: ONCOLOGY | Facility: CLINIC | Age: 71
End: 2021-03-04
Attending: PHYSICIAN ASSISTANT
Payer: COMMERCIAL

## 2021-03-04 DIAGNOSIS — C21.1 MALIGNANT NEOPLASM OF ANAL CANAL (H): ICD-10-CM

## 2021-03-04 DIAGNOSIS — C34.90 SQUAMOUS CELL CARCINOMA OF LUNG, UNSPECIFIED LATERALITY (H): Primary | ICD-10-CM

## 2021-03-04 DIAGNOSIS — D47.2 MGUS (MONOCLONAL GAMMOPATHY OF UNKNOWN SIGNIFICANCE): ICD-10-CM

## 2021-03-04 PROCEDURE — 999N001193 HC VIDEO/TELEPHONE VISIT; NO CHARGE

## 2021-03-04 PROCEDURE — 99215 OFFICE O/P EST HI 40 MIN: CPT | Mod: 95 | Performed by: PHYSICIAN ASSISTANT

## 2021-03-04 NOTE — PROGRESS NOTES
"  Maribel is a 70 year old who is being evaluated via a billable video visit.      How would you like to obtain your AVS? MyChart  If the video visit is dropped, the invitation should be resent by: Text to cell phone: [phone]  Will anyone else be joining your video visit? No      Vitals - Patient Reported  Weight (Patient Reported): 43.1 kg (95 lb)  Height (Patient Reported): 167.6 cm (5' 5.98\")  BMI (Based on Pt Reported Ht/Wt): 15.34  Pain Score: Moderate Pain (4)  Pain Loc: Abdomen      I have reviewed and updated patient's allergy and medication list.    Concerns: NO NEW CONCERNS  Refills: NONE      Ana Clark CMA    Video Start Time: 2:33 PM  Video-Visit Details    Type of service:  Video Visit    Video End Time:2:55 pm    Originating Location (pt. Location): Home    Distant Location (provider location):  [facility]     Platform used for Video Visit: [facility]    Hematology/Oncology Established Patient Note  Mar 4, 2021      Reason for Follow-up: anal canal carcinoma      HISTORY OF PRESENT ILLNESS: Maribel Yang is a 70 year old female with PMHx of kidney transplant in 2004, lung cancer in 2014 (SCC of the RUL, stage [id number] ([place]) s/p SBRT by Dr. Mckeon), HPV, PAD, who presents with anal canal carcinoma.   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 Renteria.  She has undergone high resolution anoscopy with biopsy, which showed superficially invasive squamous cell carcinoma.  On 3/14/18, she underwent exam under anesthesia with full thickness excision of superficially invasive anal cancer by Dr. Leo.  Pathology showed poorly differentiated invasive squamous cell carcinoma, tumor size 7 mm, tumor invades into anal sphincter muscle, resection margins negative for carcinoma and high-grade dysplasia.  Invasive tumor is 1 mm from closest margin.  There is " background high grade squamous intraepithelial lesion (AIN 3).  It was staged pT1.  She has MRI pelvis on 2/28/18 that showed no pelvic or inguinal lymphadenopathy.    Patient was discussed at tumor conference, and consensus was that no further surgery was feasible, and recommendation is for chemoradiation, but with Xeloda without mitomycin, considering her co-morbidities and history of renal transplant on immunosuppression.    She started chemoradiation on 4/30/18 and completed it on 6/11/18. She presents in routine surveillance.       INTERIM HISTORY: Maribel is feeling okay today. Last week she had a fist clenching in the the middle of her stomach fairly constantly with peak at 8/10. This has improved the last few days, and she has not had any pain today. Bowels have been okay, no abdominal bloating, no n/v. No fevers/chills.     Bowels are stable with one diarrhea stool about once per week. No blood in stool.     No lumps/bumps or new or different pain.     She had varicella last month. Did not have high fevers with this and was managed outpatient. It took her 10-11 days to recover. Did have shingles in the past.     She has been walking daily for short distances. No cough or SOB with this.     REVIEW OF SYSTEMS:   14 point ROS was reviewed and is negative other than as noted above in HPI.       HOME MEDICATIONS:  Current Outpatient Medications   Medication Sig Dispense Refill     ACETAMINOPHEN PO Take 1-2 tablets by mouth every 8 hours as needed for pain       amoxicillin (AMOXIL) 500 MG capsule Take 4 capsules (2,000 mg) by mouth once as needed Prior to dental procedures 16 capsule 3     apixaban ANTICOAGULANT (ELIQUIS) 2.5 MG tablet Take 1 tablet (2.5 mg) by mouth 2 times daily 60 tablet 3     atorvastatin (LIPITOR) 20 MG tablet Take 1 tablet (20 mg) by mouth daily 90 tablet 1     calcium carbonate 600 mg-vitamin D 400 units (CALTRATE) 600-400 MG-UNIT per tablet Take 1 tablet by mouth 2 times daily        calcium citrate (CITRACAL) 950 MG tablet Take 1 tablet (950 mg) by mouth 2 times daily (Patient not taking: Reported on 10/20/2020) 60 tablet 3     cilostazol (PLETAL) 100 MG tablet TAKE ONE TABLET BY MOUTH TWICE A  tablet 3     Lactobacillus-Inulin ([facility] DIGESTIVE [facility]) CAPS TAKE ONE CAPSULE BY MOUTH EVERY DAY (DUE FOR PHYSICAL IN MARCH) 90 capsule 3     losartan (COZAAR) 50 MG tablet Take 1 tablet (50 mg) by mouth daily 90 tablet 1     metoprolol tartrate (LOPRESSOR) 100 MG tablet Take 1 tablet (100 mg) by mouth 2 times daily 180 tablet 1     NIFEdipine ER OSMOTIC (PROCARDIA XL) 90 MG 24 hr tablet TAKE ONE TABLET BY MOUTH EVERY DAY 90 tablet 1     predniSONE (DELTASONE) 5 MG tablet TAKE ONE TABLET BY MOUTH EVERY DAY 90 tablet 3     sirolimus (GENERIC EQUIVALENT) 1 MG tablet Take 2 tablets (2 mg) by mouth daily 6 tablet 0     sirolimus (GENERIC EQUIVALENT) 1 MG tablet Take 2 tablets (2 mg) by mouth daily 180 tablet 3     valACYclovir (VALTREX) 500 MG tablet Take 1 tablet (500 mg) by mouth daily for 7 days 7 tablet 0         ALLERGIES:  Allergies   Allergen Reactions     Ultracet Nausea and Vomiting and Hives     Fentanyl Nausea     Has had since she initially had the issues with nausea and tolerated it OK.      Hydrocodone Nausea and Vomiting and Hives         Objective:    Video physical exam  General: Patient appears well in no acute distress. Thin body habitus.  Skin: No visualized rash or lesions on visualized skin  Eyes: EOMI, no erythema, sclera icterus or discharge noted  Resp: Appears to be breathing comfortably without accessory muscle usage, speaking in full sentences, no cough  MSK: Appears to have normal range of motion based on visualized movements  Neurologic: No apparent tremors, facial movements symmetric  Psych: affect and mood congruent, alert and oriented    The rest of a comprehensive physical examination is deferred due to PHE (public health emergency) video restrictions    Labs:     3/1/2021 15:08   Sodium 139   Potassium 4.5   Chloride 107   Carbon Dioxide 28   Urea Nitrogen 22   Creatinine 1.38 (H)   GFR Estimate 39 (L)   GFR Estimate If Black 45 (L)   Calcium 9.4   Anion Gap 5   Albumin 3.2 (L)   Protein Total 7.4   Bilirubin Total 0.3   Alkaline Phosphatase 132   ALT 21   AST 17   Cholesterol 153   HDL Cholesterol 60   LDL Cholesterol Calculated 61   Non HDL Cholesterol 92   Triglycerides 154 (H)   Glucose 101 (H)   WBC 5.8   Hemoglobin 13.0   Hematocrit 40.4   Platelet Count 287   RBC Count 4.48   MCV 90   MCH 29.0   MCHC 32.2   RDW 13.7   Diff Method Automated Method   % Neutrophils 83.3   % Lymphocytes 7.5   % Monocytes 7.2   % Eosinophils 1.2   % Basophils 0.5   % Immature Granulocytes 0.3   Nucleated RBCs 0   Absolute Neutrophil 4.9   Absolute Lymphocytes 0.4 (L)   Absolute Monocytes 0.4   Absolute Eosinophils 0.1   Absolute Basophils 0.0   Abs Immature Granulocytes 0.0   Absolute Nucleated RBC 0.0   Albumin Fraction 3.6 (L)   Alpha 1 Fraction 0.5 (H)   Alpha 2 Fraction 1.1 (H)   Beta Fraction 0.7   ELP Interpretation: Very small monocl...   Gamma Fraction 1.1      IGG PENDING      [place] Free Lt Chain 3.25 (H)   Kappa Lambda Ratio 1.29   Lambda Free Lt Chain 2.52   Monoclonal Peak 0.1 (H)      2/13/2020 12:28 9/8/2020 10:46 3/1/2021 15:08   Albumin Fraction 3.7  3.6 (L)   Alpha 1 Fraction 0.5 (H)  0.5 (H)   Alpha 2 Fraction 1.0 (H)  1.1 (H)   Beta Fraction 0.7  0.7   ELP Interpretation: Small monoclonal ...  Very small monocl...   Gamma Fraction 1.0  1.1   HPV HIGH RISK TYPES DNA CERVICAL  Rpt    IGA   130   IGG   PENDING   IGM   138   [place] Free Lt Chain 3.15 (H)  3.25 (H)   Kappa Lambda Ratio 1.23  1.29   Lambda Free Lt Chain 2.57  2.52   Monoclonal Peak 0.1 (H)  0.1 (H)     Imaging: CT CAP 3/1/21  IMPRESSION:  1.  No evidence of metastatic disease or disease recurrence within chest, abdomen, and pelvis.  2.  There is near complete aneurysmal dilation of the  descending and abdominal aorta which has significantly worsened when compared to the prior study dated 2/13/2020. For example, the gastroesophageal junction measures up to 4.5 cm, previous measurement   3.8 cm. Large infrarenal abdominal aortic aneurysm measures up to 6 cm, previous measurement 4.9 cm. A dissection is not entirely excluded. Recommend consultation with vascular surgery for further evaluation and management. Could consider CTA of the   aorta for further evaluation.       ASSESSMENT/PLAN:  Maribel Yang is a 70 year old female with:    1) Anal canal carcinoma: history of HPV; [id number]. S/p excional biopsy on 3/14/18, with pathology showing poorly differentiated invasive squamous cell carcinoma, tumor size 7 mm, tumor invades into anal sphincter muscle, resection margins negative for carcinoma and high-grade dysplasia however Invasive tumor is 1 mm from closest margin. It was recommended to proceed with chemoradiation with Xeloda which she completed 6/2018.      She has been followed via surveillance since then. CT CAP from a few days ago shows no evidence of disease.    Per NCCN guidelines, even with T1 disease, she should have ITZEL every 3-6 months for 5 years, inguinal node palpation every 3-6 months for 5 years, anoscopy every 6-12 months for 3 years, and imaging annually for 3 years. Imaging is now complete.      She was given a treatment plan summary detailing her treatment and long term effects of treatment in August 2020.     -Overdue for anoscopy and exam with Dr. Leo--is scheduled in 2 weeks.   -Follow-up with medical oncology/survivorship annual through year 5. Through 6/2023.     2) Risk of long-term radiation side effects:   -Chronic bowel dysmotility including diarrhea, clustering, urgency, frequency, and incontinence. Antidiarrheals, fiber, and pelvic floor therapy should be considered.   -Urogenital dysfunction including vaginal dryness, urinary urgency, frequency, or  incontinence. Uro-gyn referral if urinary symptoms are present otherwise Replens for vaginal dryness.   -Risk of pelvic fractures/loss of bone density from pelvic XRT. Consider DEXA scans through PCP.     3) History of lung cancer in 2014: SCC of the RUL, stage [id number] ([place]) s/p SBRT.   -Last chest imaging a few days ago shows stable changes.  -NCCN guidelines recommend annual low-dose non-contrast chest CT now that she is 7 years out. Will plan for this next year.     4) Al[place]'s disease s/p renal transplant in 2004: followed by Dr. See.    -on immunosuppression, as per nephrology    5) MGUS: Had serum light chains and SPEP checked a few days ago which are stable. Should be followed annually.     6) Cervical and vaginal dysplasia:underwent CO2 laser ablation procedure on 9/25/18 by Dr. Garcia. Should have regular PAPs/exam through GYN or PCP. UTD. Next coloscopy is 9/2021.     7) History of basal and squamous cell carcinoma of skin: Continued follow-up with Dermatology     8) Abdominal aortic aneurysm: Further dilation of aneurysm CT from a few days ago. I talked to vascular surgery who recommended a CTA to further evaluate. This will be done next week and will follow-up with Dr. Ferrara from vascular surgery to review after this.   -With any severe abdominal pain present to ED.     9) Routine health maintenance/wellness guidelines:   -Continue to undergo regular preventative health screening, cancer screening, and immunizations with PCP.   -Maintain a healthy body weight throughout life; encouraged her to try plant based protein powder to help gain weight.   -Adhere to a healthy diet which is plant-based.   -Exercise regularly, ideally 30 minutes of moderate intensity exercise most days of the week.   -Limit alcohol consumption. No more than 1 drink per day for women.   -Tobacco cessation       45 minutes spent on the date of the encounter doing chart review, review of test results, interpretation of tests,  patient visit, documentation and discussion with other provider(s) and care coordination.     Angelica Ribeiro PA-C

## 2021-03-04 NOTE — LETTER
"    3/4/2021         RE: Maribel Yang  [address]  [facility] [address]        Dear Colleague,    Thank you for referring your patient, Maribel Yang, to the [facility]. Please see a copy of my visit note below.      Maribel is a 70 year old who is being evaluated via a billable video visit.      How would you like to obtain your AVS? MyChart  If the video visit is dropped, the invitation should be resent by: Text to cell phone: [phone]  Will anyone else be joining your video visit? No      Vitals - Patient Reported  Weight (Patient Reported): 43.1 kg (95 lb)  Height (Patient Reported): 167.6 cm (5' 5.98\")  BMI (Based on Pt Reported Ht/Wt): 15.34  Pain Score: Moderate Pain (4)  Pain Loc: Abdomen      I have reviewed and updated patient's allergy and medication list.    Concerns: NO NEW CONCERNS  Refills: NONE      Ana Clark CMA    Video Start Time: 2:33 PM  Video-Visit Details    Type of service:  Video Visit    Video End Time:2:55 pm    Originating Location (pt. Location): Home    Distant Location (provider location):  [facility]     Platform used for Video Visit: Kickboard          [facility]    Hematology/Oncology Established Patient Note  Mar 4, 2021      Reason for Follow-up: anal canal carcinoma      HISTORY OF PRESENT ILLNESS: Maribel Yang is a 70 year old female with PMHx of kidney transplant in 2004, lung cancer in 2014 (SCC of the RUL, stage [id number] (IA) s/p SBRT by Dr. Mckeon), HPV, PAD, who presents with anal canal carcinoma.   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 Renteria.  She has undergone high resolution anoscopy with biopsy, which showed superficially invasive squamous cell carcinoma.  On 3/14/18, she underwent exam under anesthesia with full thickness excision of superficially invasive anal cancer by Dr." Jose Alfredooff.  Pathology showed poorly differentiated invasive squamous cell carcinoma, tumor size 7 mm, tumor invades into anal sphincter muscle, resection margins negative for carcinoma and high-grade dysplasia.  Invasive tumor is 1 mm from closest margin.  There is background high grade squamous intraepithelial lesion (AIN 3).  It was staged pT1.  She has MRI pelvis on 2/28/18 that showed no pelvic or inguinal lymphadenopathy.    Patient was discussed at tumor conference, and consensus was that no further surgery was feasible, and recommendation is for chemoradiation, but with Xeloda without mitomycin, considering her co-morbidities and history of renal transplant on immunosuppression.    She started chemoradiation on 4/30/18 and completed it on 6/11/18. She presents in routine surveillance.       INTERIM HISTORY: Maribel is feeling okay today. Last week she had a fist clenching in the the middle of her stomach fairly constantly with peak at 8/10. This has improved the last few days, and she has not had any pain today. Bowels have been okay, no abdominal bloating, no n/v. No fevers/chills.     Bowels are stable with one diarrhea stool about once per week. No blood in stool.     No lumps/bumps or new or different pain.     She had varicella last month. Did not have high fevers with this and was managed outpatient. It took her 10-11 days to recover. Did have shingles in the past.     She has been walking daily for short distances. No cough or SOB with this.     REVIEW OF SYSTEMS:   14 point ROS was reviewed and is negative other than as noted above in HPI.       HOME MEDICATIONS:  Current Outpatient Medications   Medication Sig Dispense Refill     ACETAMINOPHEN PO Take 1-2 tablets by mouth every 8 hours as needed for pain       amoxicillin (AMOXIL) 500 MG capsule Take 4 capsules (2,000 mg) by mouth once as needed Prior to dental procedures 16 capsule 3     apixaban ANTICOAGULANT (ELIQUIS) 2.5 MG tablet Take 1 tablet (2.5  mg) by mouth 2 times daily 60 tablet 3     atorvastatin (LIPITOR) 20 MG tablet Take 1 tablet (20 mg) by mouth daily 90 tablet 1     calcium carbonate 600 mg-vitamin D 400 units (CALTRATE) 600-400 MG-UNIT per tablet Take 1 tablet by mouth 2 times daily       calcium citrate (CITRACAL) 950 MG tablet Take 1 tablet (950 mg) by mouth 2 times daily (Patient not taking: Reported on 10/20/2020) 60 tablet 3     cilostazol (PLETAL) 100 MG tablet TAKE ONE TABLET BY MOUTH TWICE A  tablet 3     Lactobacillus-Inulin ([facility] DIGESTIVE [facility]) CAPS TAKE ONE CAPSULE BY MOUTH EVERY DAY (DUE FOR PHYSICAL IN MARCH) 90 capsule 3     losartan (COZAAR) 50 MG tablet Take 1 tablet (50 mg) by mouth daily 90 tablet 1     metoprolol tartrate (LOPRESSOR) 100 MG tablet Take 1 tablet (100 mg) by mouth 2 times daily 180 tablet 1     NIFEdipine ER OSMOTIC (PROCARDIA XL) 90 MG 24 hr tablet TAKE ONE TABLET BY MOUTH EVERY DAY 90 tablet 1     predniSONE (DELTASONE) 5 MG tablet TAKE ONE TABLET BY MOUTH EVERY DAY 90 tablet 3     sirolimus (GENERIC EQUIVALENT) 1 MG tablet Take 2 tablets (2 mg) by mouth daily 6 tablet 0     sirolimus (GENERIC EQUIVALENT) 1 MG tablet Take 2 tablets (2 mg) by mouth daily 180 tablet 3     valACYclovir (VALTREX) 500 MG tablet Take 1 tablet (500 mg) by mouth daily for 7 days 7 tablet 0         ALLERGIES:  Allergies   Allergen Reactions     Ultracet Nausea and Vomiting and Hives     Fentanyl Nausea     Has had since she initially had the issues with nausea and tolerated it OK.      Hydrocodone Nausea and Vomiting and Hives         Objective:    Video physical exam  General: Patient appears well in no acute distress. Thin body habitus.  Skin: No visualized rash or lesions on visualized skin  Eyes: EOMI, no erythema, sclera icterus or discharge noted  Resp: Appears to be breathing comfortably without accessory muscle usage, speaking in full sentences, no cough  MSK: Appears to have normal range of motion based on  visualized movements  Neurologic: No apparent tremors, facial movements symmetric  Psych: affect and mood congruent, alert and oriented    The rest of a comprehensive physical examination is deferred due to PHE (public health emergency) video restrictions    Labs:    3/1/2021 15:08   Sodium 139   Potassium 4.5   Chloride 107   Carbon Dioxide 28   Urea Nitrogen 22   Creatinine 1.38 (H)   GFR Estimate 39 (L)   GFR Estimate If Black 45 (L)   Calcium 9.4   Anion Gap 5   Albumin 3.2 (L)   Protein Total 7.4   Bilirubin Total 0.3   Alkaline Phosphatase 132   ALT 21   AST 17   Cholesterol 153   HDL Cholesterol 60   LDL Cholesterol Calculated 61   Non HDL Cholesterol 92   Triglycerides 154 (H)   Glucose 101 (H)   WBC 5.8   Hemoglobin 13.0   Hematocrit 40.4   Platelet Count 287   RBC Count 4.48   MCV 90   MCH 29.0   MCHC 32.2   RDW 13.7   Diff Method Automated Method   % Neutrophils 83.3   % Lymphocytes 7.5   % Monocytes 7.2   % Eosinophils 1.2   % Basophils 0.5   % Immature Granulocytes 0.3   Nucleated RBCs 0   Absolute Neutrophil 4.9   Absolute Lymphocytes 0.4 (L)   Absolute Monocytes 0.4   Absolute Eosinophils 0.1   Absolute Basophils 0.0   Abs Immature Granulocytes 0.0   Absolute Nucleated RBC 0.0   Albumin Fraction 3.6 (L)   Alpha 1 Fraction 0.5 (H)   Alpha 2 Fraction 1.1 (H)   Beta Fraction 0.7   ELP Interpretation: Very small monocl...   Gamma Fraction 1.1      IGG PENDING      [place] Free Lt Chain 3.25 (H)   Kappa Lambda Ratio 1.29   Lambda Free Lt Chain 2.52   Monoclonal Peak 0.1 (H)      2/13/2020 12:28 9/8/2020 10:46 3/1/2021 15:08   Albumin Fraction 3.7  3.6 (L)   Alpha 1 Fraction 0.5 (H)  0.5 (H)   Alpha 2 Fraction 1.0 (H)  1.1 (H)   Beta Fraction 0.7  0.7   ELP Interpretation: Small monoclonal ...  Very small monocl...   Gamma Fraction 1.0  1.1   HPV HIGH RISK TYPES DNA CERVICAL  Rpt    IGA   130   IGG   PENDING   IGM   138   [place] Free Lt Chain 3.15 (H)  3.25 (H)   Kappa Lambda Ratio 1.23  1.29    Lambda Free Lt Chain 2.57  2.52   Monoclonal Peak 0.1 (H)  0.1 (H)     Imaging: CT CAP 3/1/21  IMPRESSION:  1.  No evidence of metastatic disease or disease recurrence within chest, abdomen, and pelvis.  2.  There is near complete aneurysmal dilation of the descending and abdominal aorta which has significantly worsened when compared to the prior study dated 2/13/2020. For example, the gastroesophageal junction measures up to 4.5 cm, previous measurement   3.8 cm. Large infrarenal abdominal aortic aneurysm measures up to 6 cm, previous measurement 4.9 cm. A dissection is not entirely excluded. Recommend consultation with vascular surgery for further evaluation and management. Could consider CTA of the   aorta for further evaluation.       ASSESSMENT/PLAN:  Maribel Yang is a 70 year old female with:    1) Anal canal carcinoma: history of HPV; [id number]. S/p excional biopsy on 3/14/18, with pathology showing poorly differentiated invasive squamous cell carcinoma, tumor size 7 mm, tumor invades into anal sphincter muscle, resection margins negative for carcinoma and high-grade dysplasia however Invasive tumor is 1 mm from closest margin. It was recommended to proceed with chemoradiation with Xeloda which she completed 6/2018.      She has been followed via surveillance since then. CT CAP from a few days ago shows no evidence of disease.    Per NCCN guidelines, even with T1 disease, she should have ITZEL every 3-6 months for 5 years, inguinal node palpation every 3-6 months for 5 years, anoscopy every 6-12 months for 3 years, and imaging annually for 3 years. Imaging is now complete.      She was given a treatment plan summary detailing her treatment and long term effects of treatment in August 2020.     -Overdue for anoscopy and exam with Dr. Leo--is scheduled in 2 weeks.   -Follow-up with medical oncology/survivorship annual through year 5. Through 6/2023.     2) Risk of long-term radiation side effects:    -Chronic bowel dysmotility including diarrhea, clustering, urgency, frequency, and incontinence. Antidiarrheals, fiber, and pelvic floor therapy should be considered.   -Urogenital dysfunction including vaginal dryness, urinary urgency, frequency, or incontinence. Uro-gyn referral if urinary symptoms are present otherwise Replens for vaginal dryness.   -Risk of pelvic fractures/loss of bone density from pelvic XRT. Consider DEXA scans through PCP.     3) History of lung cancer in 2014: SCC of the RUL, stage [id number] ([place]) s/p SBRT.   -Last chest imaging a few days ago shows stable changes.  -NCCN guidelines recommend annual low-dose non-contrast chest CT now that she is 7 years out. Will plan for this next year.     4) [place]'s disease s/p renal transplant in 2004: followed by Dr. See.    -on immunosuppression, as per nephrology    5) MGUS: Had serum light chains and SPEP checked a few days ago which are stable. Should be followed annually.     6) Cervical and vaginal dysplasia:underwent CO2 laser ablation procedure on 9/25/18 by Dr. Garcia. Should have regular PAPs/exam through GYN or PCP. UTD. Next coloscopy is 9/2021.     7) History of basal and squamous cell carcinoma of skin: Continued follow-up with Dermatology     8) Abdominal aortic aneurysm: Further dilation of aneurysm CT from a few days ago. I talked to vascular surgery who recommended a CTA to further evaluate. This will be done next week and will follow-up with Dr. Ferrara from vascular surgery to review after this.   -With any severe abdominal pain present to ED.     9) Routine health maintenance/wellness guidelines:   -Continue to undergo regular preventative health screening, cancer screening, and immunizations with PCP.   -Maintain a healthy body weight throughout life; encouraged her to try plant based protein powder to help gain weight.   -Adhere to a healthy diet which is plant-based.   -Exercise regularly, ideally 30 minutes of moderate  intensity exercise most days of the week.   -Limit alcohol consumption. No more than 1 drink per day for women.   -Tobacco cessation       45 minutes spent on the date of the encounter doing chart review, review of test results, interpretation of tests, patient visit, documentation and discussion with other provider(s) and care coordination.     Angelica Ribeiro PA-C           Again, thank you for allowing me to participate in the care of your patient.        Sincerely,        Angelica Ribeiro PA-C

## 2021-03-05 LAB
IGA SERPL-MCNC: 130 MG/DL (ref 84–499)
IGG SERPL-MCNC: 1100 MG/DL (ref 610–1616)
IGM SERPL-MCNC: 138 MG/DL (ref 35–242)

## 2021-03-11 NOTE — TELEPHONE ENCOUNTER
DIAGNOSIS: To discus surveillance of her AAA and thoracic aortic aneurysm   DATE RECEIVED: 3.17.21   NOTES STATUS DETAILS   OFFICE NOTE from referring provider Internal 3.4.21  Angelica Ribeiro PA-C  [facility] Oncology   OFFICE NOTE from other specialist Internal 12.11.20  Dr. Lisa Martinez  [facility] Family Medicine   OPERATIVE REPORT na    MEDICATION LIST Internal    PERTINENT LABS Internal    CTA (CT ANGIOGRAPHY) Internal *sched* for 3.15.21  CTA Chest/Abd/Pelvis   CT Internal 3.1.21, 2.13.20  CT Chest/Abd/Pelvis   MRI na    ULTRASOUND Internal 3.7.20  US Aorta/IVC/Iliac        none

## 2021-03-15 NOTE — PROGRESS NOTES
Colon and Rectal Surgery Clinic Note      RE: Maribel Yang  : 1950  JESSEE: 3/16/2021    Maribel is a 70 year old woman who presents today for follow up of her anal cancer.     HPI:   Maribel has a past medical history of kidney transplant in , lung cancer in , cervical dysplasia, ananorectal condyloma and vulvar intraepithelial neoplasia 2. Dr. Db Urbina excised a patch if high grade perianal dysplasia in the right anterolateral position in . 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 MRI without pelvic or inguinal lymphadenopathy.   She completed chemoradiation on 18 and presents today or follow up.  Most recent CT on 3/1/21:  IMPRESSION:  1.  No evidence of metastatic disease or disease recurrence within chest, abdomen, and pelvis.  2.  There is near complete aneurysmal dilation of the descending and abdominal aorta which has significantly worsened when compared to the prior study dated 2020. For example, the gastroesophageal junction measures up to 4.5 cm, previous measurement   3.8 cm. Large infrarenal abdominal aortic aneurysm measures up to 6 cm, previous measurement 4.9 cm. A dissection is not entirely excluded. Recommend consultation with vascular surgery for further evaluation and management. Could consider CTA of the   aorta for further evaluation.    Maribel saw Vianney Worthy NP for follow up last on 20 without evidence of recurrence and saw oncology on 3/4/21 with plan for follow up through 2023.  Today she states that she has been feeling at her baseline. No changes in bowel habits. Continues to have diarrhea 2 days per week. No blood per rectum. No pain with BMs. No fevers, wt loss. She is on Eliquis for a DVT in RLE, dx  "last March. She also is noted to have worsening of an aortic aneurysm on CT, oncology team and vascular surgery aware.  Of note, patient missed her CTA scheduled for yesterday to further evaluate her aortic aneurysm noted to be expanded on last CT scan. She agrees to reschedule.      Physical examination:  Examination was chaperoned by Julisa Machuca MD .     Vitals: /74 (BP Location: Left arm, Patient Position: Sitting, Cuff Size: Adult Small)   Pulse 72   Temp 97.8  F (36.6  C) (Oral)   Ht 1.575 m (5' 2\")   Wt 43.3 kg (95 lb 6.4 oz)   SpO2 97%   BMI 17.45 kg/m    BMI= Body mass index is 17.45 kg/m .    Gen AO x 3  Groin: No palpable inguinal lymphadenopathy. Pt does have R groin scar site with associated scar tissue, small palpable LN of normal size.   Visual inspection of anus with external skin tags present. No visible lesions, palpation of external perianal area without lesions.   ITZEL without palpable scarring   Anoscopy with normal appearing tissue, no scar visualized    Laboratory data:    Recent Labs   Lab Test 03/01/21  1508 07/22/14  1603 07/22/14  1603   WBC 5.8   < > 8.7   HGB 13.0   < > 14.4      < > 258   CR 1.38*   < > 1.60*   ALBUMIN 3.2*   < > 4.7   BILITOTAL 0.3   < > 0.4   ALKPHOS 132   < > 153*   ALT 21   < > 21   AST 17   < > 24   INR  --   --  0.94    < > = values in this interval not displayed.       Assessment/plan:     No evidence of recurrence on exam today. Repeat anoscopy, ITZEL,  and inguinal node exam in 4 months. Continue to follow with oncology with Dr. Tucker.       For details of past medical history, surgical history, family history, medications, allergies, and review of systems, please see details below.    Medical history:  Past Medical History:   Diagnosis Date     Abnormal coagulation profile     p [id number] heterozygote      Age-related osteoporosis without current pathological fracture 6/22/2019     Anemia      Antiplatelet or antithrombotic long-term use      " ASCUS with positive high risk HPV 2007, 2015    + HPV 56, 54,& 6, colp - TAL III, Leep =TAL II     Basal cell carcinoma      Depressive disorder July 2015     Hypertension      Immunosuppressed status (H)     due meds     Kidney replaced by transplant 9/04    Living donor recipient,  Rejection 7/2005     LSIL (low grade squamous intraepithelial lesion) on Pap smear 4/2013    +HPV 33 or 45, 61       PAD (peripheral artery disease) (H)      PONV (postoperative nausea and vomiting)      Squamous cell lung cancer (H)      Thrombosis of leg 1967     Unspecified disorder of kidney and ureter     X-linked dominant Alport's syndrome.       Surgical history:  Past Surgical History:   Procedure Laterality Date     BIOPSY      Kidney, Lung, Breast     BIOPSY ANAL N/A 3/14/2018    Procedure: BIOPSY ANAL;;  Surgeon: Shabbir Leo MD;  Location:  OR      TR[place]ATION OF [place]ID[place]  9[place]04    [place] [facility] [place]       COLONOSCOPY N/A 8/9/2017    Procedure: COMBINED COLONOSCOPY, SINGLE OR MULTIPLE BIOPSY/POLYPECTOMY BY BIOPSY;;  Surgeon: Sushil Hyatt MD;  Location:  GI     COLPOSCOPY,LOOP ELECTRD CERVIX EXCIS  03/11/08    TAL II     CONIZATION LEEP  7/17/2013    Procedure: CONIZATION LEEP;;  Surgeon: Liliana Renteria MD;  Location:  OR     CONIZATION LEEP N/A 8/17/2016    Procedure: CONIZATION LEEP;  Surgeon: Liliana Renteria MD;  Location:  OR     EXAM UNDER ANESTHESIA ANUS  7/15/2014    Procedure: EXAM UNDER ANESTHESIA ANUS;  Surgeon: Radha Musa MD;  [facility]: [facility]     EXAM UNDER ANESTHESIA ANUS N/A 3/14/2018    Procedure: EXAM UNDER ANESTHESIA ANUS;  Anal Exam Under Anesthesia With Excision of anal lesion, proctoscopy;  Surgeon: Shabbir Leo MD;  Location:  OR     EYE SURGERY       GENITOURINARY SURGERY       LASER CO2 EXCISE VULVA WIDE LOCAL  7/15/2014    Procedure: LASER CO2 EXCISE VULVA WIDE LOCAL;  Surgeon: Liliana Renteria MD;  Location:  OR      LASER CO2 VAGINA  2013    Procedure: LASER CO2 VAGINA;;  Surgeon: Liliana Renteria MD;  [facility]     LASER CO2 VAGINA N/A 2018    Procedure: LASER CO2 VAGINA;  Exam Under Anesthesia, CO2 Laser Ablation of Upper Vagina and Cervix;  Surgeon: Pati Garcia MD;  [facility]     MICROSCOPY ANAL  2013    Procedure: MICROSCOPY ANAL;  Anal Microscopy,  EUA vagina,Colposcopy Of Vagina And Vulva, Vaginal Biopsies, Omniguide Co2 Laser To Vagina and vulva, Loop Electrosurgical Excision Procedure To Cervix;  Surgeon: Radha Musa MD;  [facility]     MICROSCOPY ANAL  7/15/2014    Procedure: MICROSCOPY ANAL;  Surgeon: Radha Musa MD;  [facility]     VASCULAR SURGERY      Thrombectomy     ZZC NONSPECIFIC PROCEDURE      Thrombectomy     ZZC NONSPECIFIC PROCEDURE   and     Bilater eye surgery - correction for crossed eyes     ZZC NONSPECIFIC PROCEDURE      oopherectomy L     ZZC NONSPECIFIC PROCEDURE  1967    open kidney biopsy - L       Family history:  Family History   Problem Relation Age of Onset     Diabetes Father         type 2 diag age,60's     Alcohol/Drug Father      Arthritis Father      Hypertension Father      Lipids Father         high cholesterol     Arthritis Mother      Diabetes Mother      Depression Mother      Heart Disease Mother      Neurologic Disorder Mother      Obesity Mother      Psychotic Disorder Mother      Thyroid Disease Mother      Hypertension Mother      Gynecology Sister         Precancerous cell removal from cervix at age 45     Depression Sister      Allergies Sister      Alcohol/Drug Sister      Neurologic Disorder Sister      Cerebrovascular Disease Paternal Grandmother          of a stroke in her 80's     Diabetes Paternal Grandmother      Alcohol/Drug Son      Colon Polyps Sister      Breast Cancer Niece      Other Cancer Sister         Cervical     Obesity Sister      Depression Sister      Substance Abuse  Son      Substance Abuse Sister      Asthma Other      Colon Cancer No family hx of      Crohn's Disease No family hx of      Ulcerative Colitis No family hx of      Melanoma No family hx of      Skin Cancer No family hx of        Medications:  Current Outpatient Medications   Medication Sig Dispense Refill     ACETAMINOPHEN PO Take 1-2 tablets by mouth every 8 hours as needed for pain       amoxicillin (AMOXIL) 500 MG capsule Take 4 capsules (2,000 mg) by mouth once as needed Prior to dental procedures 16 capsule 3     apixaban ANTICOAGULANT (ELIQUIS) 2.5 MG tablet Take 1 tablet (2.5 mg) by mouth 2 times daily 60 tablet 3     atorvastatin (LIPITOR) 20 MG tablet Take 1 tablet (20 mg) by mouth daily 90 tablet 1     calcium carbonate 600 mg-vitamin D 400 units (CALTRATE) 600-400 MG-UNIT per tablet Take 1 tablet by mouth 2 times daily       cilostazol (PLETAL) 100 MG tablet TAKE ONE TABLET BY MOUTH TWICE A  tablet 3     Lactobacillus-Inulin ([facility] DIGESTIVE [facility]) CAPS TAKE ONE CAPSULE BY MOUTH EVERY DAY (DUE FOR PHYSICAL IN MARCH) 90 capsule 3     losartan (COZAAR) 50 MG tablet Take 1 tablet (50 mg) by mouth daily 90 tablet 1     metoprolol tartrate (LOPRESSOR) 100 MG tablet Take 1 tablet (100 mg) by mouth 2 times daily 180 tablet 1     NIFEdipine ER OSMOTIC (PROCARDIA XL) 90 MG 24 hr tablet TAKE ONE TABLET BY MOUTH EVERY DAY 90 tablet 1     predniSONE (DELTASONE) 5 MG tablet TAKE ONE TABLET BY MOUTH EVERY DAY 90 tablet 3     sirolimus (GENERIC EQUIVALENT) 1 MG tablet Take 2 tablets (2 mg) by mouth daily 6 tablet 0     sirolimus (GENERIC EQUIVALENT) 1 MG tablet Take 2 tablets (2 mg) by mouth daily 180 tablet 3     calcium citrate (CITRACAL) 950 MG tablet Take 1 tablet (950 mg) by mouth 2 times daily (Patient not taking: Reported on 10/20/2020) 60 tablet 3     valACYclovir (VALTREX) 500 MG tablet Take 1 tablet (500 mg) by mouth daily for 7 days 7 tablet 0       Allergies:  The patientis allergic to  "ultracet; fentanyl; and hydrocodone.    Social history:  Social History     Tobacco Use     Smoking status: Current Some Day Smoker     Packs/day: 0.30     Years: 35.00     Pack years: 10.50     Types: Cigarettes     Start date: 1/1/1967     Smokeless tobacco: Never Used     Tobacco comment: Couple times a week. 1-2 cigs 1-2x a week.   Substance Use Topics     Alcohol use: Not Currently     Alcohol/week: 0.0 standard drinks     Frequency: Monthly or less     Drinks per session: 1 or 2     Binge frequency: Less than monthly     Comment: rarely     Marital status: .    Review of Systems:  Nursing Notes:   Chantell Ellis CMA  3/16/2021  3:23 PM  Signed  Chief Complaint   Patient presents with     Follow Up     8 month anoscopy for anal cancer       Vitals:    03/16/21 1516   BP: 125/74   BP Location: Left arm   Patient Position: Sitting   Cuff Size: Adult Small   Pulse: 72   Temp: 97.8  F (36.6  C)   TempSrc: Oral   SpO2: 97%   Weight: 95 lb 6.4 oz   Height: 5' 2\"       Body mass index is 17.45 kg/m .          Chantell Shay CMA         20 minutes spent on the date of the encounter doing chart review, history and exam, documentation, review of imaging, and further activities as noted above.     Julisa Machuca MD  General Surgery PGY1  Pager [phone]        I saw and examined the patient, led the discussion and edited the resident note.  I agree with the assessment and plan as outlined.    Shabbir Leo MD   Professor and Chief  Division of Colon and Rectal Surgery  [facility]      Referring Provider:  No referring provider defined for this encounter.     Primary Care Provider:  Lisa Martinez    This note was created using speech recognition software and may contain unintended word substitutions.  "

## 2021-03-16 ENCOUNTER — OFFICE VISIT (OUTPATIENT)
Dept: SURGERY | Facility: CLINIC | Age: 71
End: 2021-03-16
Payer: COMMERCIAL

## 2021-03-16 VITALS
OXYGEN SATURATION: 97 % | SYSTOLIC BLOOD PRESSURE: 125 MMHG | DIASTOLIC BLOOD PRESSURE: 74 MMHG | TEMPERATURE: 97.8 F | HEART RATE: 72 BPM | BODY MASS INDEX: 17.55 KG/M2 | WEIGHT: 95.4 LBS | HEIGHT: 62 IN

## 2021-03-16 DIAGNOSIS — C21.1 MALIGNANT NEOPLASM OF ANAL CANAL (H): Primary | ICD-10-CM

## 2021-03-16 DIAGNOSIS — I73.9 PERIPHERAL ARTERY DISEASE (H): ICD-10-CM

## 2021-03-16 DIAGNOSIS — N18.32 STAGE 3B CHRONIC KIDNEY DISEASE (H): ICD-10-CM

## 2021-03-16 PROCEDURE — 99213 OFFICE O/P EST LOW 20 MIN: CPT | Performed by: COLON & RECTAL SURGERY

## 2021-03-16 ASSESSMENT — MIFFLIN-ST. JEOR: SCORE: 915.98

## 2021-03-16 ASSESSMENT — PAIN SCALES - GENERAL: PAINLEVEL: NO PAIN (0)

## 2021-03-16 NOTE — PATIENT INSTRUCTIONS
Follow up:    Please call with any questions or concerns regarding your clinic visit today.    It is a pleasure being involved in your health care.    Contacts post-consultation depending on your need:    Radiology Appointments [phone]    Schedule Clinic Appointments [phone] # 1   M-F 7:30 - 5 pm    [facility] [phone]    Clinic Fax Number [phone]    Surgery Scheduling [phone]    My Chart is available 24 hours a day and is a secure way to access your records and communicate with your care team.  I strongly recommend signing up if you haven't already done so, if you are comfortable with computers.  If you would like to inquire about this or are having problems with My Chart access, you may call [phone] or go online at [email or web address].  Please allow at least 24 hours for a response and extra time on weekends and Holidays.

## 2021-03-16 NOTE — LETTER
3/16/2021       RE: Maribel Yang  [address]  [facility] [address]     Dear Colleague,    Thank you for referring your patient, Maribel Yang, to the [facility] [place] [facility]. Please see a copy of my visit note below.    Colon and Rectal Surgery Clinic Note      RE: Maribel Yang  : 1950  JESSEE: 3/16/2021    Maribel is a 70 year old woman who presents today for follow up of her anal cancer.     HPI:   Maribel has a past medical history of kidney transplant in , lung cancer in , cervical dysplasia, ananorectal condyloma and vulvar intraepithelial neoplasia 2. Dr. Db Urbina excised a patch if high grade perianal dysplasia in the right anterolateral position in . 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 MRI without pelvic or inguinal lymphadenopathy.   She completed chemoradiation on 18 and presents today or follow up.  Most recent CT on 3/1/21:  IMPRESSION:  1.  No evidence of metastatic disease or disease recurrence within chest, abdomen, and pelvis.  2.  There is near complete aneurysmal dilation of the descending and abdominal aorta which has significantly worsened when compared to the prior study dated 2020. For example, the gastroesophageal junction measures up to 4.5 cm, previous measurement   3.8 cm. Large infrarenal abdominal aortic aneurysm measures up to 6 cm, previous measurement 4.9 cm. A dissection is not entirely excluded. Recommend consultation with vascular surgery for further evaluation and management. Could consider CTA of the   aorta for further evaluation.    Maribel saw Vianney Worthy NP for follow up  "last on 9/2/20 without evidence of recurrence and saw oncology on 3/4/21 with plan for follow up through 6/2023.  Today she states that she has been feeling at her baseline. No changes in bowel habits. Continues to have diarrhea 2 days per week. No blood per rectum. No pain with BMs. No fevers, wt loss. She is on Eliquis for a DVT in RLE, dx last March. She also is noted to have worsening of an aortic aneurysm on CT, oncology team and vascular surgery aware.  Of note, patient missed her CTA scheduled for yesterday to further evaluate her aortic aneurysm noted to be expanded on last CT scan. She agrees to reschedule.      Physical examination:  Examination was chaperoned by Julisa Machuca MD .     Vitals: /74 (BP Location: Left arm, Patient Position: Sitting, Cuff Size: Adult Small)   Pulse 72   Temp 97.8  F (36.6  C) (Oral)   Ht 1.575 m (5' 2\")   Wt 43.3 kg (95 lb 6.4 oz)   SpO2 97%   BMI 17.45 kg/m    BMI= Body mass index is 17.45 kg/m .    Gen AO x 3  Groin: No palpable inguinal lymphadenopathy. Pt does have R groin scar site with associated scar tissue, small palpable LN of normal size.   Visual inspection of anus with external skin tags present. No visible lesions, palpation of external perianal area without lesions.   ITZEL without palpable scarring   Anoscopy with normal appearing tissue, no scar visualized    Laboratory data:    Recent Labs   Lab Test 03/01/21  1508 07/22/14  1603 07/22/14  1603   WBC 5.8   < > 8.7   HGB 13.0   < > 14.4      < > 258   CR 1.38*   < > 1.60*   ALBUMIN 3.2*   < > 4.7   BILITOTAL 0.3   < > 0.4   ALKPHOS 132   < > 153*   ALT 21   < > 21   AST 17   < > 24   INR  --   --  0.94    < > = values in this interval not displayed.       Assessment/plan:     No evidence of recurrence on exam today. Repeat anoscopy, ITZEL,  and inguinal node exam in 4 months. Continue to follow with oncology with Dr. Tucker.       For details of past medical history, surgical history, family " history, medications, allergies, and review of systems, please see details below.    Medical history:  Past Medical History:   Diagnosis Date     Abnormal coagulation profile     p [id number] heterozygote      Age-related osteoporosis without current pathological fracture 6/22/2019     Anemia      Antiplatelet or antithrombotic long-term use      ASCUS with positive high risk HPV 2007, 2015    + HPV 56, 54,& 6, colp - TAL III, Leep =TAL II     Basal cell carcinoma      Depressive disorder July 2015     Hypertension      Immunosuppressed status (H)     due meds     Kidney replaced by transplant 9/04    Living donor recipient,  Rejection 7/2005     LSIL (low grade squamous intraepithelial lesion) on Pap smear 4/2013    +HPV 33 or 45, 61       PAD (peripheral artery disease) (H)      PONV (postoperative nausea and vomiting)      Squamous cell lung cancer (H)      Thrombosis of leg 1967     Unspecified disorder of kidney and ureter     X-linked dominant Alport's syndrome.       Surgical history:  Past Surgical History:   Procedure Laterality Date     BIOPSY      Kidney, Lung, Breast     BIOPSY ANAL N/A 3/14/2018    Procedure: BIOPSY ANAL;;  Surgeon: Shabbir Leo MD;  Location:  OR      TRANSPLANTATION OF KIDNEY  9/04    recipient -- done at [facility]     COLONOSCOPY       COLONOSCOPY N/A 8/9/2017    Procedure: COMBINED COLONOSCOPY, SINGLE OR MULTIPLE BIOPSY/POLYPECTOMY BY BIOPSY;;  Surgeon: Sushil Hyatt MD;  Location:  GI     COLPOSCOPY,LOOP ELECTRD CERVIX EXCIS  03/11/08    TAL II     CONIZATION LEEP  7/17/2013    Procedure: CONIZATION LEEP;;  Surgeon: Liliana Renteria MD;  Location: [facility] OR     CONIZATION LEEP N/A 8/17/2016    Procedure: CONIZATION LEEP;  Surgeon: Liliana Renteria MD;  Location: [facility]     EXAM UNDER ANESTHESIA ANUS  7/15/2014    Procedure: EXAM UNDER ANESTHESIA ANUS;  Surgeon: Radha Musa MD;  Location: [facility] OR     EXAM UNDER ANESTHESIA ANUS N/A 3/14/2018    Procedure:  EXAM UNDER ANESTHESIA ANUS;  Anal Exam Under Anesthesia With Excision of anal lesion, proctoscopy;  Surgeon: Shabbir Leo MD;  Location: [facility] OR     EYE SURGERY       GENITOURINARY SURGERY       LASER CO2 EXCISE VULVA WIDE LOCAL  7/15/2014    Procedure: LASER CO2 EXCISE VULVA WIDE LOCAL;  Surgeon: Liliana Renteria MD;  Location: [facility] OR     LASER CO2 VAGINA  7/17/2013    Procedure: LASER CO2 VAGINA;;  Surgeon: Liliana Renteria MD;  Location: [facility]     LASER CO2 VAGINA N/A 9/25/2018    Procedure: LASER CO2 VAGINA;  Exam Under Anesthesia, CO2 Laser Ablation of Upper Vagina and Cervix;  Surgeon: Pati Garcia MD;  Location: [facility] OR     MICROSCOPY ANAL  7/17/2013    Procedure: MICROSCOPY ANAL;  Anal Microscopy,  EUA vagina,Colposcopy Of Vagina And Vulva, Vaginal Biopsies, Omniguide Co2 Laser To Vagina and vulva, Loop Electrosurgical Excision Procedure To Cervix;  Surgeon: Radha Musa MD;  Location: [facility] OR     MICROSCOPY ANAL  7/15/2014    Procedure: MICROSCOPY ANAL;  Surgeon: Radha Musa MD;  Location: [facility]     VASCULAR SURGERY      Thrombectomy     ZZC NONSPECIFIC PROCEDURE      Thrombectomy     ZZC NONSPECIFIC PROCEDURE  1955 and 1959    Bilater eye surgery - correction for crossed eyes     ZZC NONSPECIFIC PROCEDURE  1998    oopherectomy L     ZZC NONSPECIFIC PROCEDURE  1967    open kidney biopsy - L       Family history:  Family History   Problem Relation Age of Onset     Diabetes Father         type 2 diag age,60's     Alcohol/Drug Father      Arthritis Father      Hypertension Father      Lipids Father         high cholesterol     Arthritis Mother      Diabetes Mother      Depression Mother      Heart Disease Mother      Neurologic Disorder Mother      Obesity Mother      Psychotic Disorder Mother      Thyroid Disease Mother      Hypertension Mother      Gynecology Sister         Precancerous cell removal from cervix at age 45     Depression Sister      Allergies Sister       Alcohol/Drug Sister      Neurologic Disorder Sister      Cerebrovascular Disease Paternal Grandmother          of a stroke in her 80's     Diabetes Paternal Grandmother      Alcohol/Drug Son      Colon Polyps Sister      Breast Cancer Niece      Other Cancer Sister         Cervical     Obesity Sister      Depression Sister      Substance Abuse Son      Substance Abuse Sister      Asthma Other      Colon Cancer No family hx of      Crohn's Disease No family hx of      Ulcerative Colitis No family hx of      Melanoma No family hx of      Skin Cancer No family hx of        Medications:  Current Outpatient Medications   Medication Sig Dispense Refill     ACETAMINOPHEN PO Take 1-2 tablets by mouth every 8 hours as needed for pain       amoxicillin (AMOXIL) 500 MG capsule Take 4 capsules (2,000 mg) by mouth once as needed Prior to dental procedures 16 capsule 3     apixaban ANTICOAGULANT (ELIQUIS) 2.5 MG tablet Take 1 tablet (2.5 mg) by mouth 2 times daily 60 tablet 3     atorvastatin (LIPITOR) 20 MG tablet Take 1 tablet (20 mg) by mouth daily 90 tablet 1     calcium carbonate 600 mg-vitamin D 400 units (CALTRATE) 600-400 MG-UNIT per tablet Take 1 tablet by mouth 2 times daily       cilostazol (PLETAL) 100 MG tablet TAKE ONE TABLET BY MOUTH TWICE A  tablet 3     Lactobacillus-Inulin ([facility] DIGESTIVE [facility]) CAPS TAKE ONE CAPSULE BY MOUTH EVERY DAY (DUE FOR PHYSICAL IN MARCH) 90 capsule 3     losartan (COZAAR) 50 MG tablet Take 1 tablet (50 mg) by mouth daily 90 tablet 1     metoprolol tartrate (LOPRESSOR) 100 MG tablet Take 1 tablet (100 mg) by mouth 2 times daily 180 tablet 1     NIFEdipine ER OSMOTIC (PROCARDIA XL) 90 MG 24 hr tablet TAKE ONE TABLET BY MOUTH EVERY DAY 90 tablet 1     predniSONE (DELTASONE) 5 MG tablet TAKE ONE TABLET BY MOUTH EVERY DAY 90 tablet 3     sirolimus (GENERIC EQUIVALENT) 1 MG tablet Take 2 tablets (2 mg) by mouth daily 6 tablet 0     sirolimus (GENERIC EQUIVALENT) 1 MG tablet  "Take 2 tablets (2 mg) by mouth daily 180 tablet 3     calcium citrate (CITRACAL) 950 MG tablet Take 1 tablet (950 mg) by mouth 2 times daily (Patient not taking: Reported on 10/20/2020) 60 tablet 3     valACYclovir (VALTREX) 500 MG tablet Take 1 tablet (500 mg) by mouth daily for 7 days 7 tablet 0       Allergies:  The patientis allergic to ultracet; fentanyl; and hydrocodone.    Social history:  Social History     Tobacco Use     Smoking status: Current Some Day Smoker     Packs/day: 0.30     Years: 35.00     Pack years: 10.50     Types: Cigarettes     Start date: 1/1/1967     Smokeless tobacco: Never Used     Tobacco comment: Couple times a week. 1-2 cigs 1-2x a week.   Substance Use Topics     Alcohol use: Not Currently     Alcohol/week: 0.0 standard drinks     Frequency: Monthly or less     Drinks per session: 1 or 2     Binge frequency: Less than monthly     Comment: rarely     Marital status: .    Review of Systems:  Nursing Notes:   Chantell Ellis CMA  3/16/2021  3:23 PM  Signed  Chief Complaint   Patient presents with     Follow Up     8 month anoscopy for anal cancer       Vitals:    03/16/21 1516   BP: 125/74   BP Location: Left arm   Patient Position: Sitting   Cuff Size: Adult Small   Pulse: 72   Temp: 97.8  F (36.6  C)   TempSrc: Oral   SpO2: 97%   Weight: 95 lb 6.4 oz   Height: 5' 2\"       Body mass index is 17.45 kg/m .          Chantell Shay CMA         20 minutes spent on the date of the encounter doing chart review, history and exam, documentation, review of imaging, and further activities as noted above.     Julisa Machuca MD  General Surgery PGY1  Pager [phone]      I saw and examined the patient, led the discussion and edited the resident note.  I agree with the assessment and plan as outlined.    Shabbir Leo MD   Professor and Chief  Division of Colon and Rectal Surgery  [facility]    Referring Provider:  No referring " provider defined for this encounter.     Primary Care Provider:  Lisa Martinez    This note was created using speech recognition software and may contain unintended word substitutions.

## 2021-03-16 NOTE — NURSING NOTE
"Chief Complaint   Patient presents with     Follow Up     8 month anoscopy for anal cancer       Vitals:    03/16/21 1516   BP: 125/74   BP Location: Left arm   Patient Position: Sitting   Cuff Size: Adult Small   Pulse: 72   Temp: 97.8  F (36.6  C)   TempSrc: Oral   SpO2: 97%   Weight: 95 lb 6.4 oz   Height: 5' 2\"       Body mass index is 17.45 kg/m .          Chantell Shay CMA    "

## 2021-03-17 ENCOUNTER — PRE VISIT (OUTPATIENT)
Dept: VASCULAR SURGERY | Facility: CLINIC | Age: 71
End: 2021-03-17

## 2021-03-17 DIAGNOSIS — Z94.0 KIDNEY REPLACED BY TRANSPLANT: Primary | ICD-10-CM

## 2021-03-17 RX ORDER — PREDNISONE 5 MG/1
5 TABLET ORAL DAILY
Qty: 90 TABLET | Refills: 0 | Status: ON HOLD | OUTPATIENT
Start: 2021-03-17 | End: 2021-05-22

## 2021-03-21 PROBLEM — N18.30 CHRONIC KIDNEY DISEASE, STAGE 3 (H): Status: ACTIVE | Noted: 2021-03-21

## 2021-03-22 ENCOUNTER — ANCILLARY PROCEDURE (OUTPATIENT)
Dept: CT IMAGING | Facility: CLINIC | Age: 71
End: 2021-03-22
Attending: SURGERY
Payer: COMMERCIAL

## 2021-03-22 DIAGNOSIS — I71.40 AAA (ABDOMINAL AORTIC ANEURYSM) (H): ICD-10-CM

## 2021-03-22 DIAGNOSIS — I71.20 ANEURYSM OF THORACIC AORTA (H): ICD-10-CM

## 2021-03-22 LAB
CREAT BLD-MCNC: 1.6 MG/DL (ref 0.52–1.04)
GFR SERPL CREATININE-BSD FRML MDRD: 32 ML/MIN/{1.73_M2}

## 2021-03-22 PROCEDURE — 71275 CT ANGIOGRAPHY CHEST: CPT | Performed by: RADIOLOGY

## 2021-03-22 PROCEDURE — 82565 ASSAY OF CREATININE: CPT | Performed by: PATHOLOGY

## 2021-03-22 PROCEDURE — 36415 COLL VENOUS BLD VENIPUNCTURE: CPT | Performed by: PATHOLOGY

## 2021-03-22 PROCEDURE — 74174 CTA ABD&PLVS W/CONTRAST: CPT | Performed by: RADIOLOGY

## 2021-03-22 RX ORDER — IOPAMIDOL 755 MG/ML
100 INJECTION, SOLUTION INTRAVASCULAR ONCE
Status: COMPLETED | OUTPATIENT
Start: 2021-03-22 | End: 2021-03-22

## 2021-03-22 RX ADMIN — IOPAMIDOL 100 ML: 755 INJECTION, SOLUTION INTRAVASCULAR at 14:37

## 2021-03-24 ENCOUNTER — VIRTUAL VISIT (OUTPATIENT)
Dept: VASCULAR SURGERY | Facility: CLINIC | Age: 71
End: 2021-03-24
Payer: COMMERCIAL

## 2021-03-24 ENCOUNTER — TELEPHONE (OUTPATIENT)
Dept: FAMILY MEDICINE | Facility: CLINIC | Age: 71
End: 2021-03-24

## 2021-03-24 DIAGNOSIS — I71.40 ABDOMINAL AORTIC ANEURYSM (AAA) WITHOUT RUPTURE (H): Primary | ICD-10-CM

## 2021-03-24 DIAGNOSIS — I71.20 THORACIC AORTIC ANEURYSM WITHOUT RUPTURE (H): ICD-10-CM

## 2021-03-24 DIAGNOSIS — I70.219 ATHEROSCLEROSIS WITH CLAUDICATION OF EXTREMITY (H): ICD-10-CM

## 2021-03-24 PROCEDURE — 99205 OFFICE O/P NEW HI 60 MIN: CPT | Mod: 95 | Performed by: SURGERY

## 2021-03-24 RX ORDER — CEFAZOLIN SODIUM 2 G/50ML
2 SOLUTION INTRAVENOUS SEE ADMIN INSTRUCTIONS
Status: CANCELLED | OUTPATIENT
Start: 2021-03-24

## 2021-03-24 RX ORDER — CEFAZOLIN SODIUM 2 G/50ML
2 SOLUTION INTRAVENOUS
Status: CANCELLED | OUTPATIENT
Start: 2021-03-24

## 2021-03-24 ASSESSMENT — PAIN SCALES - GENERAL: PAINLEVEL: NO PAIN (0)

## 2021-03-24 NOTE — LETTER
3/24/2021       RE: Maribel Yang  [address]  [facility] [address]     Dear Colleague,    Thank you for referring your patient, Maribel Yang, to the [facility] [place] at [facility]. Please see a copy of my visit note below.      Vascular Surgery Consultation Note     Patient:  Maribel Yang   Date of birth 1950, Medical record number [id number]  Date of Visit:  03/24/2021  Consult Requester:No att. providers found            Assessment and Recommendations:   ASSESSMENT / RECOMMENDATION:  70-year-old very pleasant female with a 5.9 cm infrarenal abdominal aortic aneurysm along with a 4.9 cm thoracic aortic aneurysm, left pelvic kidney transplant with a history of lung and anal cancer, currently in remission.  She has been in stable health and pleased with the stability and remission of her malignancies.  She lives independently and is active.  Her aortoiliac anatomy and surgical history, while complicated, makes her most suitable for an aorto uniiliac endovascular aneurysm repair with a femoral-femoral bypass.  I believe this will have a minimal amount of impact on her transplanted kidney in that at no point will there be cessation of arterial inflow into the transplant with the endovascular repair.  We make use of half-strength contrast and will be able to minimize this.  Prior to deployment of the endovascular graft, I will need to plug the origin of the right common iliac artery in order to prevent endoleak.  Once the aorto uniiliac device is in place we will proceed with a left to right femoral-femoral bypass.  There does appear to be some thrombus in her right common femoral artery.  I will I will assess at the time of operation whether this can be cleaned of thrombus and be a suitable anastomotic target or whether I will need to go to the superficial femoral artery.    I had a long discussion with the  patient and reviewed the options including her own CT scan images and graphics of how the repair will be undertaken.  She had an opportunity to ask questions.  She will be meeting with Dr. Martinez for a preoperative history and physical.  We will stop her Eliquis prior to operation then discontinue afterward.    Many thanks for involving me in the care of this very pleasant patient. Should any questions or concerns arise, please don't hesitate to contact me.    Warm Regards,    Abena Ferrara MD, DFSVS, RPVI  Director, [facility] Vascular Services  Professor and Chief, Vascular and Endovascular Surgery  [facility]  Cell: [phone]  [email or web address]    75 minutes spent on the date of the encounter doing chart review, review of outside records, review of test results, interpretation of tests, patient visit, documentation and discussion with other provider(s)       HPI: Ms Yang is a very pleasant 70-year-old female being seen today for evaluation of a known abdominal aortic aneurysm.  She has no family history however she was a smoker in the past and occasionally does smoke during the week at this time.  She has a history of hypertension which has actually been improved recently.  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  She has a history of lung cancer which was managed by radiation and continues on annual chest CT surveillance.  This has been in remission with no sign of recurrence.  She also has a history of anal cancer that was managed with radiation to the pelvis and chemotherapy.  She saw Dr. Leo recently who gave her a good report and noted no evidence of recurrence.  She has a history of deep vein thrombosis that was diagnosed last March for which she has been on Eliquis.  She has longstanding issues with her right lower extremity with claudication.  She notes this was present before the pelvic irradiation for her anal cancer.  She otherwise denies  stroke or TIA.      Review of Systems   Constitutional, HEENT, cardiovascular, pulmonary, GI, , musculoskeletal, neuro, skin, endocrine and psych systems are negative, except as otherwise noted.    Physical Exam   GENERAL: Healthy, alert and no distress  EYES: Eyes grossly normal to inspection.  No discharge or erythema, or obvious scleral/conjunctival abnormalities.  RESP: No audible wheeze, cough, or visible cyanosis.  No visible retractions or increased work of breathing.    SKIN: Visible skin clear. No significant rash, abnormal pigmentation or lesions.  NEURO: Cranial nerves grossly intact.  Mentation and speech appropriate for age.  PSYCH: Mentation appears normal, affect normal/bright, judgement and insight intact, normal speech and appearance well-groomed.    CTA of the aorta shows a 4.9 cm distal thoracic aortic aneurysm along with a 5.9 cm infrarenal abdominal aortic aneurysm.  There is angulation of the neck however I do see a seal zone which could be achieved and measures approximately 12 to 15 mm in length.  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  There is patency of the right common iliac artery as well as the iliac bifurcation into the internal iliac artery on the right.  The left common and external iliac arteries are patent without evidence of aneurysmal or occlusive disease.  Her transplanted kidney shows a patent renal artery off of the external iliac artery on the left.      Maribel is a 70 year old who is being evaluated via a billable video visit.      How would you like to obtain your AVS? MyChart  If the video visit is dropped, the invitation should be resent by: Other e-mail: my chart connect  Will anyone else be joining your video visit? No    Video-Visit Details    Type of service:  Video Visit    Video Start Time: 1:00 PM    Video End Time:1:45 PM    Originating  Location (pt. Location): Home    Distant Location (provider location):  [facility] VASCULAR CLINIC [place]     Platform used for Video Visit: Mikaela    Again, thank you for allowing me to participate in the care of your patient.      Sincerely,    Abena Ferrara MD

## 2021-03-24 NOTE — TELEPHONE ENCOUNTER
Please schedule patient for preop - ideally this is scheduled 2-3 weeks PRIOR to surgery to help if anything needed prior to the surgery.  Thanks  PN

## 2021-03-24 NOTE — PATIENT INSTRUCTIONS
Preventive Care:    Breast Cancer Screening: During our visit today, we discussed that it is recommended you receive breast cancer screening. Please call or make an appointment with your primary care provider to discuss this with them. You may also call the [facility] scheduling line ([phone]) to set up a mammography appointment at the [facility] within the [facility].

## 2021-03-24 NOTE — TELEPHONE ENCOUNTER
----- Message from Abena Ferrara MD sent at 3/24/2021  4:03 PM CDT -----  Regarding: Pre-op H&P?  Good afternoon Dr. Martinez!    I hope this finds you well.  Thank you for involving me in Ms. Yang's care.  Her aortic anatomy, left pelvic kidney transplant, and occluded right external iliac artery make her best suited for an endovascular approach.  I will be doing an endovascular aneurysm repair using an aorto uniiliac device.  Part of using this device is the intended occlusion of the contralateral common iliac artery.  This necessitates a femoral-femoral bypass, which in her case, will actually be beneficial given that her right external iliac artery is occluded along with a portion of her common femoral artery.Would you kindly be able to perform a preoperative history and physical?  My , Zaira, is copied on this message and can update you with the date of the planned procedure.  Many thanks in advance.  I will keep you informed of her progress.    Warm Regards,  Abena Ferrara MD, DFSVS, RPVI  Director, [facility] Vascular Services  Chief, Vascular and Endovascular Surgery  [facility]  [email or web address]  Cell: [phone]

## 2021-03-24 NOTE — LETTER
3/24/2021      RE: Maribel Yang  [address]  [facility] [address]         Vascular Surgery Consultation Note     Patient:  Maribel Yang   Date of birth 1950, Medical record number [id number]  Date of Visit:  03/24/2021  Consult Requester:No att. providers found            Assessment and Recommendations:   ASSESSMENT / RECOMMENDATION:  70-year-old very pleasant female with a 5.9 cm infrarenal abdominal aortic aneurysm along with a 4.9 cm thoracic aortic aneurysm, left pelvic kidney transplant with a history of lung and anal cancer, currently in remission.  She has been in stable health and pleased with the stability and remission of her malignancies.  She lives independently and is active.  Her aortoiliac anatomy and surgical history, while complicated, makes her most suitable for an aorto uniiliac endovascular aneurysm repair with a femoral-femoral bypass.  I believe this will have a minimal amount of impact on her transplanted kidney in that at no point will there be cessation of arterial inflow into the transplant with the endovascular repair.  We make use of half-strength contrast and will be able to minimize this.  Prior to deployment of the endovascular graft, I will need to plug the origin of the right common iliac artery in order to prevent endoleak.  Once the aorto uniiliac device is in place we will proceed with a left to right femoral-femoral bypass.  There does appear to be some thrombus in her right common femoral artery.  I will I will assess at the time of operation whether this can be cleaned of thrombus and be a suitable anastomotic target or whether I will need to go to the superficial femoral artery.    I had a long discussion with the patient and reviewed the options including her own CT scan images and graphics of how the repair will be undertaken.  She had an opportunity to ask questions.  She will be meeting with Dr. Martienz for a preoperative history and physical.   We will stop her Eliquis prior to operation then discontinue afterward.      Many thanks for involving me in the care of this very pleasant patient. Should any questions or concerns arise, please don't hesitate to contact me.    Warm Regards,    Abena Ferrara MD, DFSVS, RPVI  Director, [facility] Vascular Services  Professor and Chief, Vascular and Endovascular Surgery  [facility]  Cell: [phone]  [email or web address]          75 minutes spent on the date of the encounter doing chart review, review of outside records, review of test results, interpretation of tests, patient visit, documentation and discussion with other provider(s)           HPI: Ms Yang is a very pleasant 70-year-old female being seen today for evaluation of a known abdominal aortic aneurysm.  She has no family history however she was a smoker in the past and occasionally does smoke during the week at this time.  She has a history of hypertension which has actually been improved recently.  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  She has a history of lung cancer which was managed by radiation and continues on annual chest CT surveillance.  This has been in remission with no sign of recurrence.  She also has a history of anal cancer that was managed with radiation to the pelvis and chemotherapy.  She saw Dr. Leo recently who gave her a good report and noted no evidence of recurrence.  She has a history of deep vein thrombosis that was diagnosed last March for which she has been on Eliquis.  She has longstanding issues with her right lower extremity with claudication.  She notes this was present before the pelvic irradiation for her anal cancer.  She otherwise denies stroke or TIA.      Review of Systems   Constitutional, HEENT, cardiovascular, pulmonary, GI, , musculoskeletal, neuro, skin, endocrine and psych systems are negative, except as otherwise noted.    Physical Exam   GENERAL:  Healthy, alert and no distress  EYES: Eyes grossly normal to inspection.  No discharge or erythema, or obvious scleral/conjunctival abnormalities.  RESP: No audible wheeze, cough, or visible cyanosis.  No visible retractions or increased work of breathing.    SKIN: Visible skin clear. No significant rash, abnormal pigmentation or lesions.  NEURO: Cranial nerves grossly intact.  Mentation and speech appropriate for age.  PSYCH: Mentation appears normal, affect normal/bright, judgement and insight intact, normal speech and appearance well-groomed.    CTA of the aorta shows a 4.9 cm distal thoracic aortic aneurysm along with a 5.9 cm infrarenal abdominal aortic aneurysm.  There is angulation of the neck however I do see a seal zone which could be achieved and measures approximately 12 to 15 mm in length.  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  There is patency of the right common iliac artery as well as the iliac bifurcation into the internal iliac artery on the right.  The left common and external iliac arteries are patent without evidence of aneurysmal or occlusive disease.  Her transplanted kidney shows a patent renal artery off of the external iliac artery on the left.      Maribel is a 70 year old who is being evaluated via a billable video visit.      How would you like to obtain your AVS? MyChart  If the video visit is dropped, the invitation should be resent by: Other e-mail: my chart connect  Will anyone else be joining your video visit? No    Video-Visit Details    Type of service:  Video Visit    Video Start Time: 1:00 PM    Video End Time:1:45 PM    Originating Location (pt. Location): Home    Distant Location (provider location):  [facility] [place]     Platform used for Video Visit: Mikaela Ferrara MD

## 2021-03-24 NOTE — NURSING NOTE
Vascular Rooming Note     Maribel Ynag's goals for this visit include:   Chief Complaint   Patient presents with     Consult     Maribel, is participating in a virtual visit today for a consult regarding surveillance for AAA, feeling  good, no concerns at this time, as reported by patient.     Kianna Erickson LPN

## 2021-03-24 NOTE — PROGRESS NOTES
Vascular Surgery Consultation Note     Patient:  Maribel Yang   Date of birth 1950, Medical record number [id number]  Date of Visit:  03/24/2021  Consult Requester:Sara att. providers found            Assessment and Recommendations:   ASSESSMENT / RECOMMENDATION:  70-year-old very pleasant female with a 5.9 cm infrarenal abdominal aortic aneurysm along with a 4.9 cm thoracic aortic aneurysm, left pelvic kidney transplant with a history of lung and anal cancer, currently in remission.  She has been in stable health and pleased with the stability and remission of her malignancies.  She lives independently and is active.  Her aortoiliac anatomy and surgical history, while complicated, makes her most suitable for an aorto uniiliac endovascular aneurysm repair with a femoral-femoral bypass.  I believe this will have a minimal amount of impact on her transplanted kidney in that at no point will there be cessation of arterial inflow into the transplant with the endovascular repair.  We make use of half-strength contrast and will be able to minimize this.  Prior to deployment of the endovascular graft, I will need to plug the origin of the right common iliac artery in order to prevent endoleak.  Once the aorto uniiliac device is in place we will proceed with a left to right femoral-femoral bypass.  There does appear to be some thrombus in her right common femoral artery.  I will I will assess at the time of operation whether this can be cleaned of thrombus and be a suitable anastomotic target or whether I will need to go to the superficial femoral artery.    I had a long discussion with the patient and reviewed the options including her own CT scan images and graphics of how the repair will be undertaken.  She had an opportunity to ask questions.  She will be meeting with Dr. Martinez for a preoperative history and physical.  We will stop her Eliquis prior to operation then discontinue afterward.      Many thanks  for involving me in the care of this very pleasant patient. Should any questions or concerns arise, please don't hesitate to contact me.    Warm Regards,    Abena Ferrara MD, DFSVS, RPVI  Director, [facility] Vascular Services  Professor and Chief, Vascular and Endovascular Surgery  [facility]  Cell: [phone]  [email or web address]          75 minutes spent on the date of the encounter doing chart review, review of outside records, review of test results, interpretation of tests, patient visit, documentation and discussion with other provider(s)           HPI: Ms Yang is a very pleasant 70-year-old female being seen today for evaluation of a known abdominal aortic aneurysm.  She has no family history however she was a smoker in the past and occasionally does smoke during the week at this time.  She has a history of hypertension which has actually been improved recently.  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  She has a history of lung cancer which was managed by radiation and continues on annual chest CT surveillance.  This has been in remission with no sign of recurrence.  She also has a history of anal cancer that was managed with radiation to the pelvis and chemotherapy.  She saw Dr. Leo recently who gave her a good report and noted no evidence of recurrence.  She has a history of deep vein thrombosis that was diagnosed last March for which she has been on Eliquis.  She has longstanding issues with her right lower extremity with claudication.  She notes this was present before the pelvic irradiation for her anal cancer.  She otherwise denies stroke or TIA.      Review of Systems   Constitutional, HEENT, cardiovascular, pulmonary, GI, , musculoskeletal, neuro, skin, endocrine and psych systems are negative, except as otherwise noted.    Physical Exam   GENERAL: Healthy, alert and no distress  EYES: Eyes grossly normal to inspection.  No discharge or  erythema, or obvious scleral/conjunctival abnormalities.  RESP: No audible wheeze, cough, or visible cyanosis.  No visible retractions or increased work of breathing.    SKIN: Visible skin clear. No significant rash, abnormal pigmentation or lesions.  NEURO: Cranial nerves grossly intact.  Mentation and speech appropriate for age.  PSYCH: Mentation appears normal, affect normal/bright, judgement and insight intact, normal speech and appearance well-groomed.    CTA of the aorta shows a 4.9 cm distal thoracic aortic aneurysm along with a 5.9 cm infrarenal abdominal aortic aneurysm.  There is angulation of the neck however I do see a seal zone which could be achieved and measures approximately 12 to 15 mm in length.  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  There is patency of the right common iliac artery as well as the iliac bifurcation into the internal iliac artery on the right.  The left common and external iliac arteries are patent without evidence of aneurysmal or occlusive disease.  Her transplanted kidney shows a patent renal artery off of the external iliac artery on the left.        Maribel is a 70 year old who is being evaluated via a billable video visit.      How would you like to obtain your AVS? MyChart  If the video visit is dropped, the invitation should be resent by: Other e-mail: my chart connect  Will anyone else be joining your video visit? No    Video Start Time: 1:00 PM    Video-Visit Details    Type of service:  Video Visit    Video End Time:1:45 PM    Originating Location (pt. Location): Home    Distant Location (provider location):  [facility] [place]     Platform used for Video Visit: [employer]

## 2021-03-25 ENCOUNTER — TELEPHONE (OUTPATIENT)
Dept: TRANSPLANT | Facility: CLINIC | Age: 71
End: 2021-03-25

## 2021-03-25 NOTE — TELEPHONE ENCOUNTER
Patient called to F/U on her [employer] patient Assistance application. Patient would like a return call to see when it was faxed. The [employer] assistance program will be faxing another application.

## 2021-03-26 ENCOUNTER — TELEPHONE (OUTPATIENT)
Dept: VASCULAR SURGERY | Facility: CLINIC | Age: 71
End: 2021-03-26

## 2021-03-26 DIAGNOSIS — Z11.59 ENCOUNTER FOR SCREENING FOR OTHER VIRAL DISEASES: ICD-10-CM

## 2021-03-26 PROBLEM — I71.40 ABDOMINAL AORTIC ANEURYSM (AAA) WITHOUT RUPTURE (H): Status: ACTIVE | Noted: 2021-03-26

## 2021-03-26 NOTE — TELEPHONE ENCOUNTER
Spoke with patient to schedule procedure with Dr. Abena Ferrara    Procedure was scheduled on 04/06 at [facility]  Patient will have H&P with Lisa Martinez (PCP)     Patient is aware a COVID-19 test is needed before their procedure. The test should be with-in 4 days of their procedure.   Test Details: Date 04/02 Location  Upto     PO Visit scheduled on:     2 week  04/19 DAYANA  4-6 week  05/05 MD    Patient is aware a / is needed day of surgery.   Surgery letter was sent via [employer] and Mail, patient has my direct contact information for any further questions.

## 2021-03-26 NOTE — TELEPHONE ENCOUNTER
Called pt   Left  for her to callback   Spot with JF on hold for Monday at 930am  Per PN route this TE to JF once pt scheduled with him  Dannielle DSOUZA RN

## 2021-03-29 ENCOUNTER — TELEPHONE (OUTPATIENT)
Dept: TRANSPLANT | Facility: CLINIC | Age: 71
End: 2021-03-29

## 2021-03-29 DIAGNOSIS — Z79.899 IMMUNOSUPPRESSIVE MANAGEMENT ENCOUNTER FOLLOWING KIDNEY TRANSPLANT: ICD-10-CM

## 2021-03-29 DIAGNOSIS — Z48.298 AFTERCARE FOLLOWING ORGAN TRANSPLANT: ICD-10-CM

## 2021-03-29 DIAGNOSIS — Z94.0 KIDNEY TRANSPLANTED: Primary | ICD-10-CM

## 2021-03-29 DIAGNOSIS — Z94.0 IMMUNOSUPPRESSIVE MANAGEMENT ENCOUNTER FOLLOWING KIDNEY TRANSPLANT: ICD-10-CM

## 2021-03-29 RX ORDER — SIROLIMUS 1 MG/1
2 TABLET, FILM COATED ORAL DAILY
Qty: 8 TABLET | Refills: 0 | Status: SHIPPED | OUTPATIENT
Start: 2021-03-29 | End: 2021-04-02

## 2021-03-29 RX ORDER — SIROLIMUS 2 MG/1
2 TABLET, FILM COATED ORAL DAILY
Qty: 4 TABLET | Refills: 0 | Status: SHIPPED | OUTPATIENT
Start: 2021-03-29 | End: 2021-03-29

## 2021-03-29 NOTE — TELEPHONE ENCOUNTER
Patient Call: Medication Refill    Pharmacy Name: [id number]   Pharmacy Location: [place], [place] - [address]  Name of Medication: sirolimus (GENERIC EQUIVALENT) 1 MG tablet  When will the patient be out of this medication?: Less than 24 hours (Shashi ANN, then page if no answer)     Patient is all out while waiting for her assistance program . She only wants four days worth please. She will pay out of pocket

## 2021-03-29 NOTE — TELEPHONE ENCOUNTER
PN and JS,   Ended up speaking with pt this afternoon   Morning spot with JF passed at this time  Pt prefers preop Friday anyway since will be here for Covid test that day  Scheduled with JS    Next 5 appointments (look out 90 days)    Apr 02, 2021 10:40 AM  Pre-Op physical with Waylon Swanson PA-C  [facility] [address] [id number] [phone]   Apr 02, 2021 11:00 AM  Pre-procedure Covid with [facility]  [facility] ([facility] [address] [id number] [phone]        Dannielle DSOUZA RN

## 2021-03-29 NOTE — TELEPHONE ENCOUNTER
Called back Maribel Yang who reports taking 2 mg Sirolimus daily.  Requesting Sirolimus 2 mg daily x 4 days, will pay out of pocket.  E-prescribed and called in prescription to:  [id number] - [address] Phone:  [phone]   Fax:  [phone]        2 mg stock not available. Switched to two 1 mg pills daily.  Maribel Yang made aware.  Appreciates help.

## 2021-03-30 NOTE — TELEPHONE ENCOUNTER
Any chance she can come in sooner, like Wed or Thurs.  We can get COVID test any day, and if we wait until Friday, may not even have labs back before surgery on Monday.  Would make pre-op even more difficult.      Luis Fernando Swanson PA-C

## 2021-03-30 NOTE — TELEPHONE ENCOUNTER
JS,    Patient's Preop rescheduled to Thursday 4/1 at 11:00 am. (pt. Preference)    Monse Hopson RN

## 2021-04-01 ENCOUNTER — OFFICE VISIT (OUTPATIENT)
Dept: FAMILY MEDICINE | Facility: CLINIC | Age: 71
End: 2021-04-01
Payer: COMMERCIAL

## 2021-04-01 VITALS
OXYGEN SATURATION: 98 % | RESPIRATION RATE: 20 BRPM | HEIGHT: 62 IN | TEMPERATURE: 97.9 F | HEART RATE: 75 BPM | SYSTOLIC BLOOD PRESSURE: 133 MMHG | WEIGHT: 94 LBS | BODY MASS INDEX: 17.3 KG/M2 | DIASTOLIC BLOOD PRESSURE: 80 MMHG

## 2021-04-01 DIAGNOSIS — I71.40 ABDOMINAL AORTIC ANEURYSM (AAA) WITHOUT RUPTURE (H): ICD-10-CM

## 2021-04-01 DIAGNOSIS — I15.1 HYPERTENSION SECONDARY TO OTHER RENAL DISORDERS: ICD-10-CM

## 2021-04-01 DIAGNOSIS — Z01.818 PREOP GENERAL PHYSICAL EXAM: Primary | ICD-10-CM

## 2021-04-01 DIAGNOSIS — Z94.0 KIDNEY REPLACED BY TRANSPLANT: ICD-10-CM

## 2021-04-01 DIAGNOSIS — Z11.59 ENCOUNTER FOR SCREENING FOR OTHER VIRAL DISEASES: ICD-10-CM

## 2021-04-01 DIAGNOSIS — D84.9 IMMUNOSUPPRESSION (H): ICD-10-CM

## 2021-04-01 DIAGNOSIS — I82.411 ACUTE DEEP VEIN THROMBOSIS (DVT) OF FEMORAL VEIN OF RIGHT LOWER EXTREMITY (H): ICD-10-CM

## 2021-04-01 PROCEDURE — 93000 ELECTROCARDIOGRAM COMPLETE: CPT | Performed by: PHYSICIAN ASSISTANT

## 2021-04-01 PROCEDURE — 99214 OFFICE O/P EST MOD 30 MIN: CPT | Performed by: PHYSICIAN ASSISTANT

## 2021-04-01 ASSESSMENT — MIFFLIN-ST. JEOR: SCORE: 913.6

## 2021-04-01 NOTE — PATIENT INSTRUCTIONS

## 2021-04-01 NOTE — PROGRESS NOTES
[facility] [place]  [address]  [facility] [address]  Phone: [phone]  Primary Provider: Lisa Martinez  Pre-op Performing Provider: BRIANA AGRAWAL      PREOPERATIVE EVALUATION:  Today's date: 4/1/2021    Maribel Yang is a 70 year old female who presents for a preoperative evaluation.    Surgical Information:  Surgery/Procedure: Abdominal aortic aneurysm (AAA) without rupture (H)  Surgery Location: [facility]   Surgeon: Dr. Ferrara   Surgery Date: 04/06/2021  Time of Surgery: 830  Where patient plans to recover: At home with family  Fax number for surgical facility: Note does not need to be faxed, will be available electronically in Epic.    Type of Anesthesia Anticipated: to be determined    Assessment & Plan     The proposed surgical procedure is considered HIGH risk.    Preop general physical exam    - EKG 12-lead complete w/read - Clinics    Abdominal aortic aneurysm (AAA) without rupture (H)    - EKG 12-lead complete w/read - Clinics    Immunosuppression (H)  Currently on sirolimus and prednisone.  Anticipate stress dose of prednisone, but will double check with transplant.      Kidney replaced by transplant  As above    Hypertension secondary to other renal disorders  At goal    Acute deep vein thrombosis (DVT) of femoral vein of right lower extremity (H)  Vascular plan is to stop eliquis prior to surgery, and then discontinue.  Will have been 1 year since DEEP VEIN THROMBOSIS.  Patient was unaware of this, so I will double check with vascular.             Risks and Recommendations:  The patient has the following additional risks and recommendations for perioperative complications:   - Consult Hospitalist / IM to assist with post-op medical management   - Recurrent use of steroids    Medication Instructions:  Patient is to take all scheduled medications on the day of surgery EXCEPT for modifications listed below:   - apixaban  (Eliquis), edoxaban (Savaysa), rivaroxaban (Xarelto): CrCl <50 mL/min. HOLD 72 hours before procedure.     - cilostazal (Pletal): HOLD 2-3 days prior to surgery.   - ACE/ARB: HOLD due to exceptional risk of hypotension during surgery.    - Beta Blockers: Continue taking on the day of surgery.   - Calcium Channel Blockers: May be continued on the day of surgery.   - Statins: Continue taking on the day of surgery.    - Intraoperative stress dose steroids may be indicated due to chronic steroid use in the last 3 months (e.g. > 3 weeks of prednisone 20 mg or daily prednisone 5 mg).    RECOMMENDATION:  APPROVAL GIVEN to proceed with proposed procedure, without further diagnostic evaluation.                      Subjective     HPI related to upcoming procedure: 69 y/o new to me female here for pre op exam.  Maribel has a long and complicated past medical history.    Preop Questions 4/1/2021   1. Have you ever had a heart attack or stroke? No   2. Have you ever had surgery on your heart or blood vessels, such as a stent placement, a coronary artery bypass, or surgery on an artery in your head, neck, heart, or legs? YES -    3. Do you have chest pain with activity? No   4. Do you have a history of  heart failure? No   5. Do you currently have a cold, bronchitis or symptoms of other infection? No   6. Do you have a cough, shortness of breath, or wheezing? No   7. Do you or anyone in your family have previous history of blood clots? YES - herself, DEEP VEIN THROMBOSIS March 2020, currently on Eliquis   8. Do you or does anyone in your family have a serious bleeding problem such as prolonged bleeding following surgeries or cuts? No   9. Have you ever had problems with anemia or been told to take iron pills? YES - not currently, HEMOGLOBIN 13 March 2021   10. Have you had any abnormal blood loss such as black, tarry or bloody stools, or abnormal vaginal bleeding? No   11. Have you ever had a blood transfusion? No   12. Are you  willing to have a blood transfusion if it is medically needed before, during, or after your surgery? Yes   13. Have you or any of your relatives ever had problems with anesthesia? No   14. Do you have sleep apnea, excessive snoring or daytime drowsiness? No   15. Do you have any artifical heart valves or other implanted medical devices like a pacemaker, defibrillator, or continuous glucose monitor? No   16. Do you have artificial joints? No   17. Are you allergic to latex? No   18. Is there any chance that you may be pregnant? -       Health Care Directive:  Patient does not have a Health Care Directive or Living Will: Patient states has Advance Directive and will bring in a copy to clinic.    Preoperative Review of :   reviewed - no record of controlled substances prescribed.      Status of Chronic Conditions:  See problem list for active medical problems.  Problems all longstanding and stable, except as noted/documented.  See ROS for pertinent symptoms related to these conditions.    HYPERTENSION - Patient has longstanding history of HTN , currently denies any symptoms referable to elevated blood pressure. Specifically denies chest pain, palpitations, dyspnea, orthopnea, PND or peripheral edema. Blood pressure readings have been in normal range. Current medication regimen is as listed below. Patient denies any side effects of medication.       Review of Systems  CONSTITUTIONAL: NEGATIVE for fever, chills, change in weight  INTEGUMENTARY/SKIN: NEGATIVE for worrisome rashes, moles or lesions  EYES: NEGATIVE for vision changes or irritation  ENT/MOUTH: NEGATIVE for ear, mouth and throat problems  RESP: NEGATIVE for significant cough or SOB  CV: NEGATIVE for chest pain, palpitations or peripheral edema  GI: NEGATIVE for nausea, abdominal pain, heartburn, or change in bowel habits  : NEGATIVE for frequency, dysuria, or hematuria  MUSCULOSKELETAL: NEGATIVE for significant arthralgias or myalgia  NEURO: NEGATIVE  for weakness, dizziness or paresthesias  ENDOCRINE: NEGATIVE for temperature intolerance, skin/hair changes  HEME: NEGATIVE for bleeding problems  PSYCHIATRIC: NEGATIVE for changes in mood or affect    Patient Active Problem List    Diagnosis Date Noted     Abdominal aortic aneurysm (AAA) without rupture (H) 03/26/2021     Priority: Medium     Added automatically from request for surgery [id number]       Chronic kidney disease, stage 3 03/21/2021     Priority: Medium     Acute deep vein thrombosis (DVT) of proximal vein of lower extremity, unspecified laterality (H) 05/29/2020     Priority: Medium     Dx 3/2020  Rx for Eliquis but financial issues currently so on Lovenox.  Plan to restart when she can afford  Stopped the Plavix but continuing her Pletal       Age-related osteoporosis without current pathological fracture 06/22/2019     Priority: Medium     Aftercare following organ transplant 05/05/2018     Priority: Medium     Malignant neoplasm of anal canal (H) 04/09/2018     Priority: Medium     Cherry angioma 06/12/2016     Priority: Medium     Skin cancer screening 06/12/2016     Priority: Medium     Intertrigo 06/12/2016     Priority: Medium     History of basal cell carcinoma 06/12/2016     Priority: Medium     Alopecia 10/27/2015     Priority: Medium     Vaginal dysplasia 04/02/2015     Priority: Medium     Lumbago 10/16/2014     Priority: Medium     Squamous cell lung cancer (H)      Priority: Medium     Following with oncology   Had radiation therapy - 3 sessions  F/u CT completed  PET scheduled 6/16/14       Peripheral artery disease (H) 01/16/2014     Priority: Medium     YUNIOR III (vulvar intraepithelial neoplasia III) 09/03/2013     Priority: Medium     History of basal cell carcinoma 05/24/2013     Priority: Medium     Immunosuppression (H)      Priority: Medium     HTN, kidney transplant related      Priority: Medium     CARDIOVASCULAR SCREENING; LDL GOAL LESS THAN 100 10/31/2010     Priority: Medium      S/P LEEP of cervix 03/11/2008     Priority: Medium     1/07: + HPV 6 Low risk  10/07: ASCUS, + HPV 56, 54 & 6. 12/07: [place]: TAL II  3/11/08: LEEP - TAL II  8/08: ASCUS, ECC atypia, +HPV 82  9/08: [place] - Atypia  5/09: NIL pap, 11/09: NIL pap, neg HPV  4/13: LSIL, + HPV 33 or 45, 61. 5/15/13: [place] - VAIN II & III,TAL II. Referred to gyn onc.   6/20/13: [place] with gyn onc. Plan LEEP and CO2 laser to vagina, cx and vaginal vault. Reminder done  7/17/13: Anal Microscopy, EUA vagina,Colposcopy Of Vagina And Vulva, Vaginal Biopsies, Omniguide Co2 Laser To Vagina and vulva, Loop Electrosurgical Excision Procedure To Cervix- YUNIOR 1,2 & 3: AIN 2, VAIN 2, Leep bx benign Plan colp at gyn onc in 4 months. Reminder sent.  12/2/13: ASC H, + HPV 33/45, HSIL anal pap, YUNIOR 1 & 2, AIN 1 & 3. F/u deferred due to lung ca radiation  5/8/14: ASCUS, + HPV 33/45. 5/22/14: consult with Dr. Renteria, surgery planned in reminders  7/15/14: surgery-Exam under anesthesia, vulva and vaginal colposcopy, vulvar biopsy, CO2 laser of the vulva, vaginal pap smear - Pap LSIL, + HPV 33/45. Vulvar bx HSIL Plan repeat colp and pap with NP at gyn onc.  9/17/15: [place] with gyn onc. Due for repeat colp 3 -4 months. Tracking started.  5/26/16: Pap with vaginal Bx--Atyp/YUNIOR 1. Plan: Endometrial Bx  6/28/16: EMB, ECC--TAL 3. Plan: LEEP  8/17/16: LEEP--TAL 1, YUNIOR 1. Plan: Pap and colposcopy 6mo, due 2/17/17.  06/01/17 Pap--NIL/+HR HPV. [place]--negative. Plan: repeat colp and pap in 6mo.  12/14/17 [place]--visually normal, no Bx. Plan: colp & pap in 6mo.       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     Priority: Medium      Past Medical History:   Diagnosis Date     Abnormal coagulation profile     p [id number]>A heterozygote      Age-related osteoporosis without current pathological fracture 6/22/2019     Anemia      Antiplatelet or antithrombotic  long-term use      ASCUS with positive high risk HPV 2007, 2015    + HPV 56, 54,& 6, colp - TAL III, Leep =TAL II     Basal cell carcinoma      Depressive disorder July 2015     Hypertension      Immunosuppressed status (H)     due meds     Kidney replaced by transplant 9/04    Living donor recipient,  Rejection 7/2005     LSIL (low grade squamous intraepithelial lesion) on Pap smear 4/2013    +HPV 33 or 45, 61       PAD (peripheral artery disease) (H)      PONV (postoperative nausea and vomiting)      Squamous cell lung cancer (H)      Thrombosis of leg 1967     Unspecified disorder of kidney and ureter     X-linked dominant Alport's syndrome.     Past Surgical History:   Procedure Laterality Date     BIOPSY      Kidney, Lung, Breast     BIOPSY ANAL N/A 3/14/2018    Procedure: BIOPSY ANAL;;  Surgeon: Shabbir Leo MD;  Location:  OR      TRANSPLANTATION OF KIDNEY  9/04    recipient -- done at [facility]     COLONOSCOPY       COLONOSCOPY N/A 8/9/2017    Procedure: COMBINED COLONOSCOPY, SINGLE OR MULTIPLE BIOPSY/POLYPECTOMY BY BIOPSY;;  Surgeon: Sushil Hyatt MD;  Location:  GI     COLPOSCOPY,LOOP ELECTRD CERVIX EXCIS  03/11/08    TAL II     CONIZATION LEEP  7/17/2013    Procedure: CONIZATION LEEP;;  Surgeon: Liliana Renteria MD;  Location:  OR     CONIZATION LEEP N/A 8/17/2016    Procedure: CONIZATION LEEP;  Surgeon: Liliana Renteria MD;  Location: [facility] OR     EXAM UNDER ANESTHESIA ANUS  7/15/2014    Procedure: EXAM UNDER ANESTHESIA ANUS;  Surgeon: Radha Musa MD;  Location: [facility] OR     EXAM UNDER ANESTHESIA ANUS N/A 3/14/2018    Procedure: EXAM UNDER ANESTHESIA ANUS;  Anal Exam Under Anesthesia With Excision of anal lesion, proctoscopy;  Surgeon: Shabbir Leo MD;  Location:  OR     EYE SURGERY       GENITOURINARY SURGERY       LASER CO2 EXCISE VULVA WIDE LOCAL  7/15/2014    Procedure: LASER CO2 EXCISE VULVA WIDE LOCAL;  Surgeon: Liliana Renteria MD;  Location:  OR      LASER CO2 VAGINA  7/17/2013    Procedure: LASER CO2 VAGINA;;  Surgeon: Liliana Renteria MD;  [facility]     LASER CO2 VAGINA N/A 9/25/2018    Procedure: LASER CO2 VAGINA;  Exam Under Anesthesia, CO2 Laser Ablation of Upper Vagina and Cervix;  Surgeon: Pati Garcia MD;  [facility] OR     MICROSCOPY ANAL  7/17/2013    Procedure: MICROSCOPY ANAL;  Anal Microscopy,  EUA vagina,Colposcopy Of Vagina And Vulva, Vaginal Biopsies, Omniguide Co2 Laser To Vagina and vulva, Loop Electrosurgical Excision Procedure To Cervix;  Surgeon: Radha Musa MD;  [facility] OR     MICROSCOPY ANAL  7/15/2014    Procedure: MICROSCOPY ANAL;  Surgeon: Radha Musa MD;  [facility]: [facility] OR     VASCULAR SURGERY      Thrombectomy     [facility] NONSPECIFIC PROCEDURE      Thrombectomy     [facility] NONSPECIFIC PROCEDURE  1955 and 1959    Bilater eye surgery - correction for crossed eyes     [facility] NONSPECIFIC PROCEDURE  1998    oopherectomy L     [facility] NONSPECIFIC PROCEDURE  1967    open kidney biopsy - L     Current Outpatient Medications   Medication Sig Dispense Refill     ACETAMINOPHEN PO Take 1-2 tablets by mouth every 8 hours as needed for pain       apixaban ANTICOAGULANT (ELIQUIS) 2.5 MG tablet Take 1 tablet (2.5 mg) by mouth 2 times daily 60 tablet 3     atorvastatin (LIPITOR) 20 MG tablet Take 1 tablet (20 mg) by mouth daily 90 tablet 1     calcium carbonate 600 mg-vitamin D 400 units (CALTRATE) 600-400 MG-UNIT per tablet Take 1 tablet by mouth 2 times daily       calcium citrate (CITRACAL) 950 MG tablet Take 1 tablet (950 mg) by mouth 2 times daily 60 tablet 3     cilostazol (PLETAL) 100 MG tablet TAKE ONE TABLET BY MOUTH TWICE A  tablet 3     Lactobacillus-Inulin ([facility] DIGESTIVE HEALTH) CAPS TAKE ONE CAPSULE BY MOUTH EVERY DAY (DUE FOR PHYSICAL IN MARCH) 90 capsule 3     losartan (COZAAR) 50 MG tablet Take 1 tablet (50 mg) by mouth daily 90 tablet 1     metoprolol tartrate (LOPRESSOR) 100 MG tablet  "Take 1 tablet (100 mg) by mouth 2 times daily 180 tablet 1     NIFEdipine ER OSMOTIC (PROCARDIA XL) 90 MG 24 hr tablet TAKE ONE TABLET BY MOUTH EVERY DAY 90 tablet 1     predniSONE (DELTASONE) 5 MG tablet Take 1 tablet (5 mg) by mouth daily 90 tablet 0     sirolimus (GENERIC EQUIVALENT) 1 MG tablet Take 2 tablets (2 mg) by mouth daily for 4 days 8 tablet 0     sirolimus (GENERIC EQUIVALENT) 1 MG tablet Take 2 tablets (2 mg) by mouth daily 6 tablet 0     sirolimus (GENERIC EQUIVALENT) 1 MG tablet Take 2 tablets (2 mg) by mouth daily 180 tablet 3     amoxicillin (AMOXIL) 500 MG capsule Take 4 capsules (2,000 mg) by mouth once as needed Prior to dental procedures 16 capsule 3       Allergies   Allergen Reactions     Ultracet Nausea and Vomiting and Hives     Fentanyl Nausea     Has had since she initially had the issues with nausea and tolerated it OK.      Hydrocodone Nausea and Vomiting and Hives        Social History     Tobacco Use     Smoking status: Current Some Day Smoker     Packs/day: 0.30     Years: 35.00     Pack years: 10.50     Types: Cigarettes     Start date: 1/1/1967     Smokeless tobacco: Never Used     Tobacco comment: Couple times a week. 1-2 cigs 1-2x a week.   Substance Use Topics     Alcohol use: Not Currently     Alcohol/week: 0.0 standard drinks     Frequency: Monthly or less     Drinks per session: 1 or 2     Binge frequency: Less than monthly     Comment: rarely       History   Drug Use Unknown         Objective     /80   Pulse 75   Temp 97.9  F (36.6  C) (Oral)   Resp 20   Ht 1.581 m (5' 2.25\")   Wt 42.6 kg (94 lb)   SpO2 98%   BMI 17.06 kg/m      Physical Exam    GENERAL APPEARANCE: alert and active     EYES: EOMI, PERRL     NECK: no adenopathy, no asymmetry, masses, or scars and thyroid normal to palpation     RESP: lungs clear to auscultation - no rales, rhonchi or wheezes     CV: regular rates and rhythm, normal S1 S2, no S3 or S4 and no murmur, click or rub     MS: " extremities normal- no gross deformities noted, no evidence of inflammation in joints, FROM in all extremities.     SKIN: no suspicious lesions or rashes     NEURO: Normal strength and tone, sensory exam grossly normal, mentation intact and speech normal     PSYCH: mentation appears normal. and affect normal/bright    Recent Labs   Lab Test 03/01/21  1508 02/13/20  1228   HGB 13.0 13.7    228    142   POTASSIUM 4.5 3.4   CR 1.38* 1.35*        Diagnostics:  Labs pending at this time.  Results will be reviewed when available.   EKG: appears normal, NSR, unchanged from previous tracings    Revised Cardiac Risk Index (RCRI):  The patient has the following serious cardiovascular risks for perioperative complications:   - High risk surgery (>5% cardiac complication risk) = 1 point     RCRI Interpretation: 1 point: Class II (low risk - 0.9% complication rate)           Signed Electronically by: Waylon Swanson PA-C  Copy of this evaluation report is provided to requesting physician.

## 2021-04-01 NOTE — TELEPHONE ENCOUNTER
Pt stated [employer] has still not received paperwork that was to have been faxed to them on several occasions   She is getting very anxious and having surgery on Tues   Needs a call to let her know what the problem is

## 2021-04-01 NOTE — TELEPHONE ENCOUNTER
Dr. Ferrara,    I was able to see Maribel for her pre op this am.  I was reading your note, and it sounds like you are planning on stopping the Eliquis prior to surgery and not restarting.  Due to her CrCl it looks like 72 hours prior to surgery to stop.  Do you agree with that?  Also, we usually hold Pletal as well, do you want her to hold that as well? Thanks for you input, she is new to me, and rather complicated, and I want to make sure she knows what to do heading into the weekend.      Luis Fernando Swanson PA-C

## 2021-04-01 NOTE — NURSING NOTE
"Chief Complaint   Patient presents with     Pre-Op Exam     initial /80   Pulse 75   Temp 97.9  F (36.6  C) (Oral)   Ht 1.581 m (5' 2.25\")   Wt 42.6 kg (94 lb)   SpO2 93%   BMI 17.06 kg/m   Estimated body mass index is 17.06 kg/m  as calculated from the following:    Height as of this encounter: 1.581 m (5' 2.25\").    Weight as of this encounter: 42.6 kg (94 lb).  BP completed using cuff size: regular.  L  arm      Health Maintenance that is potentially due pending provider review:  NONE    n/a    Federico Olivares ma  "

## 2021-04-02 ENCOUNTER — TELEPHONE (OUTPATIENT)
Dept: TRANSPLANT | Facility: CLINIC | Age: 71
End: 2021-04-02

## 2021-04-02 ENCOUNTER — TELEPHONE (OUTPATIENT)
Dept: VASCULAR SURGERY | Facility: CLINIC | Age: 71
End: 2021-04-02

## 2021-04-02 DIAGNOSIS — Z94.0 KIDNEY REPLACED BY TRANSPLANT: Primary | ICD-10-CM

## 2021-04-02 DIAGNOSIS — Z79.899 IMMUNOSUPPRESSIVE MANAGEMENT ENCOUNTER FOLLOWING KIDNEY TRANSPLANT: ICD-10-CM

## 2021-04-02 DIAGNOSIS — Z94.0 KIDNEY TRANSPLANTED: ICD-10-CM

## 2021-04-02 DIAGNOSIS — I71.40 AAA (ABDOMINAL AORTIC ANEURYSM) (H): Primary | ICD-10-CM

## 2021-04-02 DIAGNOSIS — Z48.812 ENCOUNTER FOR SURGICAL AFTERCARE FOLLOWING SURGERY ON THE CIRCULATORY SYSTEM: ICD-10-CM

## 2021-04-02 DIAGNOSIS — Z94.0 IMMUNOSUPPRESSIVE MANAGEMENT ENCOUNTER FOLLOWING KIDNEY TRANSPLANT: ICD-10-CM

## 2021-04-02 DIAGNOSIS — Z48.298 AFTERCARE FOLLOWING ORGAN TRANSPLANT: ICD-10-CM

## 2021-04-02 DIAGNOSIS — Z01.818 PRE-OP EXAM: ICD-10-CM

## 2021-04-02 DIAGNOSIS — Z01.818 PRE-OP EXAM: Primary | ICD-10-CM

## 2021-04-02 LAB
LABORATORY COMMENT REPORT: NORMAL
SARS-COV-2 RNA RESP QL NAA+PROBE: NEGATIVE
SARS-COV-2 RNA RESP QL NAA+PROBE: NORMAL
SPECIMEN SOURCE: NORMAL
SPECIMEN SOURCE: NORMAL

## 2021-04-02 PROCEDURE — U0005 INFEC AGEN DETEC AMPLI PROBE: HCPCS | Performed by: SURGERY

## 2021-04-02 PROCEDURE — U0003 INFECTIOUS AGENT DETECTION BY NUCLEIC ACID (DNA OR RNA); SEVERE ACUTE RESPIRATORY SYNDROME CORONAVIRUS 2 (SARS-COV-2) (CORONAVIRUS DISEASE [COVID-19]), AMPLIFIED PROBE TECHNIQUE, MAKING USE OF HIGH THROUGHPUT TECHNOLOGIES AS DESCRIBED BY CMS-2020-01-R: HCPCS | Performed by: SURGERY

## 2021-04-02 RX ORDER — SIROLIMUS 1 MG/1
2 TABLET, FILM COATED ORAL DAILY
Qty: 180 TABLET | Refills: 3 | Status: ON HOLD | OUTPATIENT
Start: 2021-04-02 | End: 2021-04-06

## 2021-04-02 RX ORDER — MYCOPHENOLIC ACID 360 MG/1
360 TABLET, DELAYED RELEASE ORAL 2 TIMES DAILY
Qty: 60 TABLET | Refills: 1 | Status: ON HOLD | OUTPATIENT
Start: 2021-04-02 | End: 2021-05-22

## 2021-04-02 NOTE — OR NURSING
Update from Dr. Ferrara re: question on stress dose for steroids:    RE: Pt not cleared for surgery - Please review  Received: Today  Message Contents   Abena Ferrara MD Dalebroux, Deborah E RN   Cc: Anjana Trevino RN; P Pas Anesthesiology; Anabelle Deleon, AVERY; Waylon Swanson PA-C             Thanks Luis Fernando!! We ll follow up on that day of surgery. Last I recall we no longer gave stress dose steroids but the literature may have changed on that. Have a great weekend, Abena

## 2021-04-02 NOTE — OR NURSING
Pt not cleared for surgery - Please review  Received: Today  Message Contents   Awilda Her, Abena Brewster MD; Anabelle Deleon RN; Waylon Swanson PA-C; TAHIR De Leon Anesthesiology; Anjana Trevino RN             Dear Dr. Ferrara & Anesthesia team,     On chart review, I noted that this patient has not been cleared for surgery by PCP Sky, and H&P note is not complete. JUAN Swanson does mention concerns regarding the patient's medical complexity (including organ transplant/ steroid therapy), so I have cc'd Anesthesia and would appreciate their review as well, in case this patient needs to be seen in PAC prior to surgery.     I do see that Dr. Ferrara's RNCC Anabelle reached the pt this morning regarding PreOp preparation instructions, and clarified/confirmed that Eliquis will be stopped 3 days prior to surgery.       PAS Notification: Your patient is scheduled for surgery in 2 days. Critical chart components are missing. If the required components are not completed in EPIC by noon the day prior to surgery your case may be rescheduled. Thank you in advance for completing the record and improving [facility]'s quality of care.     Surgeon's Name: Dr. Ferrara   Date of Surgery: 4/6/21   Procedure: Endovascular abdominal aortic aneurysm repair with aorto uniiliac device and femoral-femoral bypass - Bilateral     Missing Components:   H&P within 30 days   H&P signature       Thank you,     Padmaja Her RN   PreAdmission Screening   [facility]

## 2021-04-02 NOTE — OR NURSING
Update from PCP, JUAN Swanson:    RE: Pt not cleared for surgery - Please review  Received: Today  Message Contents   Waylon Swanson PA-C Reed, Amy B, MD; Awilda Her RN; Anabelle Deleon, AVERY; P Pas Anesthesiology; Anjana Trevino RN             I have signed this note.  I was still waiting to hear back from transplant team on stress dose of prednisone.       Luis Fernando Swanson PA-C

## 2021-04-02 NOTE — TELEPHONE ENCOUNTER
Hitesh See MD Schmidt, Jeffrey David, PA-C    Cc: Lisseth Heath RN             She may require stress dose, such as hydrocortisone 40 mg IV q8 hours for a day or two.     We also need to change her off sirolimus due to wound healing issues.  Patient never let us know she was having this surgery so we will get her switches as soon as possible to mycophenolic acid.     Thanks for reaching out.     Dhruv    Previous Messages    ----- Message -----   From: Waylon Swanson PA-C   Sent: 4/1/2021   2:20 PM CDT   To: Hitesh See MD, AVERY Garay Dr. and Torey     I have seen Maribel for a pre op this am, and wanted to double check on her prednisone dose.  She is having rather significant vascular surgery next week, and I think she probably needs a stress dose increase in her prednisone.  This is the first time that I have met her, and as you well know, she is rather complicated.  Thanks for your help     Luis Fernando Swanson PA-C             Has surgery next week on 4/6 :Abdominal aortic aneurysm (AAA) without rupture (H)    Discussed with Dr. See.  Needs to be off of Sirolimus and switching back to  mg BID.    [id number] [address] Phone:  [phone]   Fax:  [phone]        Discussed with Maribel Yang. Aware of plan.

## 2021-04-02 NOTE — TELEPHONE ENCOUNTER
Reviewed pre op instructions with Maribel.    Please do not eat 8 hours prior to your surgery, you may have clear liquids until the check in time.  Please shower the night before with a non scented anti bacterial soap to reduce your risk of surgical site infection. Let us know if you are not feeling well the week of surgery as this would impact our decision to put you under general anesthesia.      Confirmed patient has completed pre op and has COVID test this AM. Reviewed instructions for stopping Eliquis 72 hours pre op.     What to Expect After Your Endovascular Aortic Aneurysm Repair    Hospital Stay  You can expect to go home the day after your procedure.     Medications   Continue to take your medications as directed. Your specific medications will be discussed with you prior to going home.    Incision/Puncture Site Care   You will have an incision or puncture in each of your groins. Liquid glue, called Dermabond, will be used to seal your incisions/puncture site. This will lift off as the incisions/puncture sites heal.   If Dermabond is not used there will be small dressings covering your incisions. After you get home, you may remove the dressings and shower - allowing the warm soapy water to run over it. Be sure to dry the sites well, and keep them dry. DO NOT SOAK IN A TUB/POOL/etc. UNTIL ALL SURGICAL SITES ARE HEALED. DO NOT REMOVE THE GLUE UNTIL THE INCISIONS HEAL.    Restrictions  Immediately after the surgery, you will be required to lay flat for a few hours to prevent bleeding.   Limit yourself to lighter activity for the first week. You may walk and go up and down steps. Avoid excessive bending or movement at the level of the incisions or punctures.    Follow-Up  If a follow up appointment was not scheduled prior to your procedure please call [phone] if you are not contacted within 3 business days following your procedure to schedule one. Follow up appointments are scheduled with either your  Physician or one of our Physician Assistants.     Risks and Possible Complications    Infection/ Drainage/Bleeding - Drainage or bleeding from the incisions/puncture site should be minimal. If you have excessive bleeding or drainage call our office right away.  Pain - You may experience some mild pain or soreness at your incision sites. You may also have some numbness around the incisions or into the insides of your thighs. Bruising is normal and should resolve within 2 weeks.   Changes in Appetite or Bowel Habits - Mostly related to anesthesia and pain medication, some have reported decreased appetite and/ or problems with constipation. These usually improve over a few weeks. Remembering to take an over-the-counter stool softener, as directed, will help you to avoid constipation.     NOTIFY OUR OFFICE AND SEEK EMERGENT TREATMENT IF YOU DEVELOP:    ? ANY FEVERS OR CHILLS, HAVE A TEMPERATURE GREATER THAN 101 F  ? ANY REDNESS OR PURULENT DRAINAGE FROM YOUR INCISIONS OR PUNCTURES  ? SEVERE ABDOMINAL, CHEST, OR BACK PAIN    Anabelle Deleon, Care Coordinator RN, Vascular Surgery  [phone]    Vascular Call Center  [phone]     To contact someone after 5 pm, on a weekend, or on a Holiday, please call:  [facility]  [phone], option 4 to have the on call Attending Vascular Surgeon paged.

## 2021-04-02 NOTE — OR NURSING
Update per Dr. Ferrara:    RE: Pt not cleared for surgery - Please review  Received: Today  Message Contents   Abena Ferrara MD Dalebroux, Deborah E, RN; Anabelle Deleon, AVERY; Waylon Swanson PA-C; P Moises Anesthesiology; Anjana Trevino RN             Thank you. She is good to go. We had communication with the PA yesterday. There would be no additional testing we would recommend. Mr Swanson, could you please sign your note?   Many thanks,   ABR

## 2021-04-05 ENCOUNTER — ANESTHESIA EVENT (OUTPATIENT)
Dept: SURGERY | Facility: CLINIC | Age: 71
DRG: 268 | End: 2021-04-05
Payer: COMMERCIAL

## 2021-04-05 NOTE — TELEPHONE ENCOUNTER
Prior Authorization Not Needed per Insurance    Medication: Mycophenolic 360mg - No pa needed  Insurance Company:  [employer]  Expected CoPay:    Refill too soon, last filled at [facility] on 4/3/21  Pharmacy Filling the Rx:  [facility]  Pharmacy Notified:    Patient Notified:

## 2021-04-05 NOTE — TELEPHONE ENCOUNTER
Judie at [employer] assistance free drug program states they have received the fax that was sent for the application and it is still under review.

## 2021-04-05 NOTE — OR NURSING
Review per Dr. García/ Anesthesia:    RE: Pt not cleared for surgery - Please review  Received: 3 days ago  Message Contents   Lucinda García MD Reed, Amy B, MD; Awilda Her RN   Cc: Anjana Trevino RN; P Pas Anesthesiology; Anabelle Deleon RN; Waylon Swanson PA-C             Anesthesia team can decide re stress dose steroids DOS - this is a common issue we face, and no need to decide ahead of time.     Lucinda

## 2021-04-06 ENCOUNTER — HOSPITAL ENCOUNTER (INPATIENT)
Facility: CLINIC | Age: 71
LOS: 4 days | Discharge: HOME HEALTH | DRG: 268 | End: 2021-04-10
Attending: SURGERY | Admitting: SURGERY
Payer: COMMERCIAL

## 2021-04-06 ENCOUNTER — APPOINTMENT (OUTPATIENT)
Dept: ULTRASOUND IMAGING | Facility: CLINIC | Age: 71
DRG: 268 | End: 2021-04-06
Attending: PHYSICIAN ASSISTANT
Payer: COMMERCIAL

## 2021-04-06 ENCOUNTER — ANESTHESIA (OUTPATIENT)
Dept: SURGERY | Facility: CLINIC | Age: 71
DRG: 268 | End: 2021-04-06
Payer: COMMERCIAL

## 2021-04-06 ENCOUNTER — APPOINTMENT (OUTPATIENT)
Dept: INTERVENTIONAL RADIOLOGY/VASCULAR | Facility: CLINIC | Age: 71
DRG: 268 | End: 2021-04-06
Attending: SURGERY
Payer: COMMERCIAL

## 2021-04-06 DIAGNOSIS — I71.40 ABDOMINAL AORTIC ANEURYSM (AAA) WITHOUT RUPTURE (H): ICD-10-CM

## 2021-04-06 DIAGNOSIS — I73.9 PERIPHERAL ARTERY DISEASE (H): ICD-10-CM

## 2021-04-06 DIAGNOSIS — I21.3 ST ELEVATION MYOCARDIAL INFARCTION (STEMI), UNSPECIFIED ARTERY (H): Primary | ICD-10-CM

## 2021-04-06 LAB
ANION GAP SERPL CALCULATED.3IONS-SCNC: 4 MMOL/L (ref 3–14)
BUN SERPL-MCNC: 18 MG/DL (ref 7–30)
CALCIUM SERPL-MCNC: 7.9 MG/DL (ref 8.5–10.1)
CHLORIDE SERPL-SCNC: 112 MMOL/L (ref 94–109)
CO2 SERPL-SCNC: 25 MMOL/L (ref 20–32)
CREAT SERPL-MCNC: 1.61 MG/DL (ref 0.52–1.04)
ERYTHROCYTE [DISTWIDTH] IN BLOOD BY AUTOMATED COUNT: 14.1 % (ref 10–15)
GFR SERPL CREATININE-BSD FRML MDRD: 32 ML/MIN/{1.73_M2}
GLUCOSE BLDC GLUCOMTR-MCNC: 153 MG/DL (ref 70–99)
GLUCOSE SERPL-MCNC: 152 MG/DL (ref 70–99)
HCT VFR BLD AUTO: 31.4 % (ref 35–47)
HGB BLD-MCNC: 10.1 G/DL (ref 11.7–15.7)
HGB BLD-MCNC: 14.1 G/DL (ref 11.7–15.7)
LACTATE BLD-SCNC: 2.5 MMOL/L (ref 0.7–2)
MCH RBC QN AUTO: 28.9 PG (ref 26.5–33)
MCHC RBC AUTO-ENTMCNC: 32.2 G/DL (ref 31.5–36.5)
MCV RBC AUTO: 90 FL (ref 78–100)
PLATELET # BLD AUTO: 132 10E9/L (ref 150–450)
POTASSIUM SERPL-SCNC: 3.5 MMOL/L (ref 3.4–5.3)
POTASSIUM SERPL-SCNC: 4.2 MMOL/L (ref 3.4–5.3)
RBC # BLD AUTO: 3.49 10E12/L (ref 3.8–5.2)
SODIUM SERPL-SCNC: 141 MMOL/L (ref 133–144)
TROPONIN I SERPL-MCNC: 5.36 UG/L (ref 0–0.04)
WBC # BLD AUTO: 9.5 10E9/L (ref 4–11)

## 2021-04-06 PROCEDURE — 36415 COLL VENOUS BLD VENIPUNCTURE: CPT | Performed by: ANESTHESIOLOGY

## 2021-04-06 PROCEDURE — C1769 GUIDE WIRE: HCPCS | Performed by: INTERNAL MEDICINE

## 2021-04-06 PROCEDURE — 370N000017 HC ANESTHESIA TECHNICAL FEE, PER MIN: Performed by: SURGERY

## 2021-04-06 PROCEDURE — 258N000003 HC RX IP 258 OP 636: Performed by: SURGERY

## 2021-04-06 PROCEDURE — 84484 ASSAY OF TROPONIN QUANT: CPT | Performed by: PHYSICIAN ASSISTANT

## 2021-04-06 PROCEDURE — 93010 ELECTROCARDIOGRAM REPORT: CPT | Mod: 59 | Performed by: INTERNAL MEDICINE

## 2021-04-06 PROCEDURE — 710N000010 HC RECOVERY PHASE 1, LEVEL 2, PER MIN: Performed by: SURGERY

## 2021-04-06 PROCEDURE — 250N000012 HC RX MED GY IP 250 OP 636 PS 637: Performed by: SURGERY

## 2021-04-06 PROCEDURE — 250N000011 HC RX IP 250 OP 636: Performed by: ANESTHESIOLOGY

## 2021-04-06 PROCEDURE — 93005 ELECTROCARDIOGRAM TRACING: CPT

## 2021-04-06 PROCEDURE — 93926 LOWER EXTREMITY STUDY: CPT | Mod: RT

## 2021-04-06 PROCEDURE — 86900 BLOOD TYPING SEROLOGIC ABO: CPT | Performed by: ANESTHESIOLOGY

## 2021-04-06 PROCEDURE — C1894 INTRO/SHEATH, NON-LASER: HCPCS | Performed by: SURGERY

## 2021-04-06 PROCEDURE — 86922 COMPATIBILITY TEST ANTIGLOB: CPT | Performed by: ANESTHESIOLOGY

## 2021-04-06 PROCEDURE — 999N000015 HC STATISTIC ARTERIAL MONITORING DAILY

## 2021-04-06 PROCEDURE — 041K0KJ BYPASS RIGHT FEMORAL ARTERY TO LEFT FEMORAL ARTERY WITH NONAUTOLOGOUS TISSUE SUBSTITUTE, OPEN APPROACH: ICD-10-PCS | Performed by: SURGERY

## 2021-04-06 PROCEDURE — 250N000013 HC RX MED GY IP 250 OP 250 PS 637

## 2021-04-06 PROCEDURE — 85027 COMPLETE CBC AUTOMATED: CPT | Performed by: PHYSICIAN ASSISTANT

## 2021-04-06 PROCEDURE — 250N000013 HC RX MED GY IP 250 OP 250 PS 637: Performed by: INTERNAL MEDICINE

## 2021-04-06 PROCEDURE — 250N000011 HC RX IP 250 OP 636: Performed by: INTERNAL MEDICINE

## 2021-04-06 PROCEDURE — 250N000013 HC RX MED GY IP 250 OP 250 PS 637: Performed by: SURGERY

## 2021-04-06 PROCEDURE — 258N000003 HC RX IP 258 OP 636: Performed by: NURSE ANESTHETIST, CERTIFIED REGISTERED

## 2021-04-06 PROCEDURE — 250N000011 HC RX IP 250 OP 636: Performed by: PHYSICIAN ASSISTANT

## 2021-04-06 PROCEDURE — 86850 RBC ANTIBODY SCREEN: CPT | Performed by: ANESTHESIOLOGY

## 2021-04-06 PROCEDURE — 999N000157 HC STATISTIC RCP TIME EA 10 MIN

## 2021-04-06 PROCEDURE — 272N000002 HC OR SUPPLY OTHER OPNP: Performed by: SURGERY

## 2021-04-06 PROCEDURE — 250N000011 HC RX IP 250 OP 636: Performed by: SURGERY

## 2021-04-06 PROCEDURE — 04V03DZ RESTRICTION OF ABDOMINAL AORTA WITH INTRALUMINAL DEVICE, PERCUTANEOUS APPROACH: ICD-10-PCS | Performed by: SURGERY

## 2021-04-06 PROCEDURE — 93454 CORONARY ARTERY ANGIO S&I: CPT | Performed by: INTERNAL MEDICINE

## 2021-04-06 PROCEDURE — C1887 CATHETER, GUIDING: HCPCS | Performed by: INTERNAL MEDICINE

## 2021-04-06 PROCEDURE — 250N000025 HC SEVOFLURANE, PER MIN: Performed by: SURGERY

## 2021-04-06 PROCEDURE — 83605 ASSAY OF LACTIC ACID: CPT | Performed by: PHYSICIAN ASSISTANT

## 2021-04-06 PROCEDURE — 250N000009 HC RX 250: Performed by: INTERNAL MEDICINE

## 2021-04-06 PROCEDURE — 360N000085 HC SURGERY LEVEL 5 W/ FLUORO, PER MIN: Performed by: SURGERY

## 2021-04-06 PROCEDURE — 99152 MOD SED SAME PHYS/QHP 5/>YRS: CPT | Performed by: INTERNAL MEDICINE

## 2021-04-06 PROCEDURE — 999N000141 HC STATISTIC PRE-PROCEDURE NURSING ASSESSMENT: Performed by: SURGERY

## 2021-04-06 PROCEDURE — 272N000001 HC OR GENERAL SUPPLY STERILE: Performed by: SURGERY

## 2021-04-06 PROCEDURE — C1763 CONN TISS, NON-HUMAN: HCPCS | Performed by: SURGERY

## 2021-04-06 PROCEDURE — 36415 COLL VENOUS BLD VENIPUNCTURE: CPT | Performed by: PHYSICIAN ASSISTANT

## 2021-04-06 PROCEDURE — 84132 ASSAY OF SERUM POTASSIUM: CPT | Performed by: ANESTHESIOLOGY

## 2021-04-06 PROCEDURE — C1725 CATH, TRANSLUMIN NON-LASER: HCPCS | Performed by: INTERNAL MEDICINE

## 2021-04-06 PROCEDURE — 250N000011 HC RX IP 250 OP 636: Performed by: NURSE ANESTHETIST, CERTIFIED REGISTERED

## 2021-04-06 PROCEDURE — C1894 INTRO/SHEATH, NON-LASER: HCPCS | Performed by: INTERNAL MEDICINE

## 2021-04-06 PROCEDURE — C1769 GUIDE WIRE: HCPCS | Performed by: SURGERY

## 2021-04-06 PROCEDURE — C1725 CATH, TRANSLUMIN NON-LASER: HCPCS | Performed by: SURGERY

## 2021-04-06 PROCEDURE — 85347 COAGULATION TIME ACTIVATED: CPT

## 2021-04-06 PROCEDURE — 255N000002 HC RX 255 OP 636: Performed by: SURGERY

## 2021-04-06 PROCEDURE — 85018 HEMOGLOBIN: CPT | Performed by: ANESTHESIOLOGY

## 2021-04-06 PROCEDURE — C1874 STENT, COATED/COV W/DEL SYS: HCPCS | Performed by: INTERNAL MEDICINE

## 2021-04-06 PROCEDURE — 250N000009 HC RX 250: Performed by: SURGERY

## 2021-04-06 PROCEDURE — 272N000001 HC OR GENERAL SUPPLY STERILE: Performed by: INTERNAL MEDICINE

## 2021-04-06 PROCEDURE — 36415 COLL VENOUS BLD VENIPUNCTURE: CPT | Performed by: STUDENT IN AN ORGANIZED HEALTH CARE EDUCATION/TRAINING PROGRAM

## 2021-04-06 PROCEDURE — C1887 CATHETER, GUIDING: HCPCS | Performed by: SURGERY

## 2021-04-06 PROCEDURE — C1760 CLOSURE DEV, VASC: HCPCS | Performed by: SURGERY

## 2021-04-06 PROCEDURE — C1768 GRAFT, VASCULAR: HCPCS | Performed by: SURGERY

## 2021-04-06 PROCEDURE — 250N000009 HC RX 250: Performed by: STUDENT IN AN ORGANIZED HEALTH CARE EDUCATION/TRAINING PROGRAM

## 2021-04-06 PROCEDURE — 86901 BLOOD TYPING SEROLOGIC RH(D): CPT | Performed by: ANESTHESIOLOGY

## 2021-04-06 PROCEDURE — 93926 LOWER EXTREMITY STUDY: CPT | Mod: 26 | Performed by: RADIOLOGY

## 2021-04-06 PROCEDURE — 278N000051 HC OR IMPLANT GENERAL: Performed by: SURGERY

## 2021-04-06 PROCEDURE — 250N000009 HC RX 250: Performed by: NURSE ANESTHETIST, CERTIFIED REGISTERED

## 2021-04-06 PROCEDURE — 999N001017 HC STATISTIC GLUCOSE BY METER IP

## 2021-04-06 PROCEDURE — 120N000003 HC R&B IMCU UMMC

## 2021-04-06 PROCEDURE — 80048 BASIC METABOLIC PNL TOTAL CA: CPT | Performed by: PHYSICIAN ASSISTANT

## 2021-04-06 PROCEDURE — C9606 PERC D-E COR REVASC W AMI S: HCPCS | Performed by: INTERNAL MEDICINE

## 2021-04-06 DEVICE — IMPLANTABLE DEVICE: Type: IMPLANTABLE DEVICE | Site: AORTA | Status: FUNCTIONAL

## 2021-04-06 DEVICE — IMPLANTABLE DEVICE: Type: IMPLANTABLE DEVICE | Site: ILIAC/FEMORALS | Status: FUNCTIONAL

## 2021-04-06 DEVICE — GRAFT ARTERY BOVINE CAROTID ARTEGRAFT 7MMX40CM AG840: Type: IMPLANTABLE DEVICE | Site: GROIN | Status: FUNCTIONAL

## 2021-04-06 RX ORDER — FENTANYL CITRATE 50 UG/ML
INJECTION, SOLUTION INTRAMUSCULAR; INTRAVENOUS PRN
Status: DISCONTINUED | OUTPATIENT
Start: 2021-04-06 | End: 2021-04-06

## 2021-04-06 RX ORDER — PREDNISONE 5 MG/1
5 TABLET ORAL DAILY
Status: DISCONTINUED | OUTPATIENT
Start: 2021-04-07 | End: 2021-04-10 | Stop reason: HOSPADM

## 2021-04-06 RX ORDER — SCOLOPAMINE TRANSDERMAL SYSTEM 1 MG/1
1 PATCH, EXTENDED RELEASE TRANSDERMAL
Status: DISCONTINUED | OUTPATIENT
Start: 2021-04-06 | End: 2021-04-10 | Stop reason: HOSPADM

## 2021-04-06 RX ORDER — NITROGLYCERIN 0.4 MG/1
0.4 TABLET SUBLINGUAL EVERY 5 MIN PRN
Status: DISCONTINUED | OUTPATIENT
Start: 2021-04-06 | End: 2021-04-06

## 2021-04-06 RX ORDER — CEFAZOLIN SODIUM 2 G/100ML
2 INJECTION, SOLUTION INTRAVENOUS
Status: COMPLETED | OUTPATIENT
Start: 2021-04-06 | End: 2021-04-06

## 2021-04-06 RX ORDER — HEPARIN SODIUM 1000 [USP'U]/ML
INJECTION, SOLUTION INTRAVENOUS; SUBCUTANEOUS
Status: DISCONTINUED | OUTPATIENT
Start: 2021-04-06 | End: 2021-04-07 | Stop reason: HOSPADM

## 2021-04-06 RX ORDER — SODIUM CHLORIDE 9 MG/ML
INJECTION, SOLUTION INTRAVENOUS CONTINUOUS
Status: DISCONTINUED | OUTPATIENT
Start: 2021-04-06 | End: 2021-04-07 | Stop reason: CLARIF

## 2021-04-06 RX ORDER — LIDOCAINE 40 MG/G
CREAM TOPICAL
Status: DISCONTINUED | OUTPATIENT
Start: 2021-04-06 | End: 2021-04-06 | Stop reason: HOSPADM

## 2021-04-06 RX ORDER — SODIUM CHLORIDE, SODIUM GLUCONATE, SODIUM ACETATE, POTASSIUM CHLORIDE AND MAGNESIUM CHLORIDE 526; 502; 368; 37; 30 MG/100ML; MG/100ML; MG/100ML; MG/100ML; MG/100ML
INJECTION, SOLUTION INTRAVENOUS CONTINUOUS PRN
Status: DISCONTINUED | OUTPATIENT
Start: 2021-04-06 | End: 2021-04-06

## 2021-04-06 RX ORDER — NALOXONE HYDROCHLORIDE 0.4 MG/ML
0.2 INJECTION, SOLUTION INTRAMUSCULAR; INTRAVENOUS; SUBCUTANEOUS
Status: ACTIVE | OUTPATIENT
Start: 2021-04-06 | End: 2021-04-07

## 2021-04-06 RX ORDER — NALOXONE HYDROCHLORIDE 0.4 MG/ML
0.4 INJECTION, SOLUTION INTRAMUSCULAR; INTRAVENOUS; SUBCUTANEOUS
Status: ACTIVE | OUTPATIENT
Start: 2021-04-06 | End: 2021-04-07

## 2021-04-06 RX ORDER — SODIUM CHLORIDE, SODIUM LACTATE, POTASSIUM CHLORIDE, CALCIUM CHLORIDE 600; 310; 30; 20 MG/100ML; MG/100ML; MG/100ML; MG/100ML
INJECTION, SOLUTION INTRAVENOUS CONTINUOUS PRN
Status: DISCONTINUED | OUTPATIENT
Start: 2021-04-06 | End: 2021-04-06

## 2021-04-06 RX ORDER — IODIXANOL 320 MG/ML
INJECTION, SOLUTION INTRAVASCULAR PRN
Status: DISCONTINUED | OUTPATIENT
Start: 2021-04-06 | End: 2021-04-06 | Stop reason: HOSPADM

## 2021-04-06 RX ORDER — FENTANYL CITRATE 50 UG/ML
25-50 INJECTION, SOLUTION INTRAMUSCULAR; INTRAVENOUS
Status: DISCONTINUED | OUTPATIENT
Start: 2021-04-06 | End: 2021-04-06 | Stop reason: HOSPADM

## 2021-04-06 RX ORDER — LIDOCAINE 40 MG/G
CREAM TOPICAL
Status: DISCONTINUED | OUTPATIENT
Start: 2021-04-06 | End: 2021-04-10 | Stop reason: HOSPADM

## 2021-04-06 RX ORDER — NITROGLYCERIN 5 MG/ML
VIAL (ML) INTRAVENOUS
Status: DISCONTINUED | OUTPATIENT
Start: 2021-04-06 | End: 2021-04-07 | Stop reason: HOSPADM

## 2021-04-06 RX ORDER — HEPARIN SODIUM 5000 [USP'U]/.5ML
5000 INJECTION, SOLUTION INTRAVENOUS; SUBCUTANEOUS EVERY 8 HOURS
Status: DISCONTINUED | OUTPATIENT
Start: 2021-04-07 | End: 2021-04-10 | Stop reason: HOSPADM

## 2021-04-06 RX ORDER — SODIUM CHLORIDE 9 MG/ML
INJECTION, SOLUTION INTRAVENOUS CONTINUOUS PRN
Status: DISCONTINUED | OUTPATIENT
Start: 2021-04-06 | End: 2021-04-06

## 2021-04-06 RX ORDER — POLYETHYLENE GLYCOL 3350 17 G/17G
17 POWDER, FOR SOLUTION ORAL DAILY
Status: DISCONTINUED | OUTPATIENT
Start: 2021-04-07 | End: 2021-04-08

## 2021-04-06 RX ORDER — ASPIRIN 81 MG/1
162 TABLET, CHEWABLE ORAL ONCE
Status: DISCONTINUED | OUTPATIENT
Start: 2021-04-06 | End: 2021-04-06

## 2021-04-06 RX ORDER — ASPIRIN 81 MG/1
81 TABLET, CHEWABLE ORAL DAILY
Status: DISCONTINUED | OUTPATIENT
Start: 2021-04-06 | End: 2021-04-07

## 2021-04-06 RX ORDER — ONDANSETRON 4 MG/1
4 TABLET, ORALLY DISINTEGRATING ORAL EVERY 6 HOURS PRN
Status: DISCONTINUED | OUTPATIENT
Start: 2021-04-06 | End: 2021-04-06

## 2021-04-06 RX ORDER — OXYCODONE HYDROCHLORIDE 10 MG/1
10 TABLET ORAL EVERY 4 HOURS PRN
Status: DISCONTINUED | OUTPATIENT
Start: 2021-04-06 | End: 2021-04-10 | Stop reason: HOSPADM

## 2021-04-06 RX ORDER — ONDANSETRON 2 MG/ML
INJECTION INTRAMUSCULAR; INTRAVENOUS PRN
Status: DISCONTINUED | OUTPATIENT
Start: 2021-04-06 | End: 2021-04-06

## 2021-04-06 RX ORDER — HYDROMORPHONE HYDROCHLORIDE 1 MG/ML
.3-.5 INJECTION, SOLUTION INTRAMUSCULAR; INTRAVENOUS; SUBCUTANEOUS EVERY 5 MIN PRN
Status: DISCONTINUED | OUTPATIENT
Start: 2021-04-06 | End: 2021-04-06 | Stop reason: HOSPADM

## 2021-04-06 RX ORDER — BUPIVACAINE HYDROCHLORIDE 2.5 MG/ML
INJECTION, SOLUTION EPIDURAL; INFILTRATION; INTRACAUDAL PRN
Status: DISCONTINUED | OUTPATIENT
Start: 2021-04-06 | End: 2021-04-06 | Stop reason: HOSPADM

## 2021-04-06 RX ORDER — METOPROLOL TARTRATE 50 MG
100 TABLET ORAL 2 TIMES DAILY
Status: DISCONTINUED | OUTPATIENT
Start: 2021-04-06 | End: 2021-04-10 | Stop reason: HOSPADM

## 2021-04-06 RX ORDER — PROPOFOL 10 MG/ML
INJECTION, EMULSION INTRAVENOUS PRN
Status: DISCONTINUED | OUTPATIENT
Start: 2021-04-06 | End: 2021-04-06

## 2021-04-06 RX ORDER — ONDANSETRON 2 MG/ML
4 INJECTION INTRAMUSCULAR; INTRAVENOUS EVERY 30 MIN PRN
Status: DISCONTINUED | OUTPATIENT
Start: 2021-04-06 | End: 2021-04-06 | Stop reason: HOSPADM

## 2021-04-06 RX ORDER — ONDANSETRON 4 MG/1
4 TABLET, ORALLY DISINTEGRATING ORAL EVERY 30 MIN PRN
Status: DISCONTINUED | OUTPATIENT
Start: 2021-04-06 | End: 2021-04-06 | Stop reason: HOSPADM

## 2021-04-06 RX ORDER — LIDOCAINE HYDROCHLORIDE 20 MG/ML
INJECTION, SOLUTION INFILTRATION; PERINEURAL PRN
Status: DISCONTINUED | OUTPATIENT
Start: 2021-04-06 | End: 2021-04-06

## 2021-04-06 RX ORDER — DOCUSATE SODIUM 100 MG/1
100 CAPSULE, LIQUID FILLED ORAL 2 TIMES DAILY
Status: DISCONTINUED | OUTPATIENT
Start: 2021-04-06 | End: 2021-04-08

## 2021-04-06 RX ORDER — DEXAMETHASONE SODIUM PHOSPHATE 4 MG/ML
INJECTION, SOLUTION INTRA-ARTICULAR; INTRALESIONAL; INTRAMUSCULAR; INTRAVENOUS; SOFT TISSUE PRN
Status: DISCONTINUED | OUTPATIENT
Start: 2021-04-06 | End: 2021-04-06

## 2021-04-06 RX ORDER — ASPIRIN 81 MG/1
162 TABLET, CHEWABLE ORAL DAILY
Status: DISCONTINUED | OUTPATIENT
Start: 2021-04-07 | End: 2021-04-06

## 2021-04-06 RX ORDER — EPHEDRINE SULFATE 50 MG/ML
INJECTION, SOLUTION INTRAMUSCULAR; INTRAVENOUS; SUBCUTANEOUS PRN
Status: DISCONTINUED | OUTPATIENT
Start: 2021-04-06 | End: 2021-04-06

## 2021-04-06 RX ORDER — ASPIRIN 81 MG/1
TABLET, CHEWABLE ORAL
Status: DISCONTINUED | OUTPATIENT
Start: 2021-04-06 | End: 2021-04-07 | Stop reason: HOSPADM

## 2021-04-06 RX ORDER — MYCOPHENOLIC ACID 360 MG/1
360 TABLET, DELAYED RELEASE ORAL 2 TIMES DAILY
Status: DISCONTINUED | OUTPATIENT
Start: 2021-04-06 | End: 2021-04-10 | Stop reason: HOSPADM

## 2021-04-06 RX ORDER — IOPAMIDOL 755 MG/ML
INJECTION, SOLUTION INTRAVASCULAR
Status: DISCONTINUED | OUTPATIENT
Start: 2021-04-06 | End: 2021-04-07 | Stop reason: HOSPADM

## 2021-04-06 RX ORDER — OXYCODONE HYDROCHLORIDE 5 MG/1
5 TABLET ORAL EVERY 4 HOURS PRN
Status: DISCONTINUED | OUTPATIENT
Start: 2021-04-06 | End: 2021-04-10 | Stop reason: HOSPADM

## 2021-04-06 RX ORDER — SODIUM CHLORIDE, SODIUM LACTATE, POTASSIUM CHLORIDE, CALCIUM CHLORIDE 600; 310; 30; 20 MG/100ML; MG/100ML; MG/100ML; MG/100ML
INJECTION, SOLUTION INTRAVENOUS CONTINUOUS
Status: DISCONTINUED | OUTPATIENT
Start: 2021-04-06 | End: 2021-04-06 | Stop reason: HOSPADM

## 2021-04-06 RX ORDER — ATORVASTATIN CALCIUM 20 MG/1
20 TABLET, FILM COATED ORAL DAILY
Status: DISCONTINUED | OUTPATIENT
Start: 2021-04-06 | End: 2021-04-07

## 2021-04-06 RX ORDER — MORPHINE SULFATE 2 MG/ML
2 INJECTION, SOLUTION INTRAMUSCULAR; INTRAVENOUS ONCE
Status: COMPLETED | OUTPATIENT
Start: 2021-04-06 | End: 2021-04-06

## 2021-04-06 RX ORDER — HEPARIN SODIUM 1000 [USP'U]/ML
INJECTION, SOLUTION INTRAVENOUS; SUBCUTANEOUS PRN
Status: DISCONTINUED | OUTPATIENT
Start: 2021-04-06 | End: 2021-04-06

## 2021-04-06 RX ORDER — CEFAZOLIN SODIUM 1 G/3ML
1 INJECTION, POWDER, FOR SOLUTION INTRAMUSCULAR; INTRAVENOUS EVERY 12 HOURS
Status: COMPLETED | OUTPATIENT
Start: 2021-04-06 | End: 2021-04-07

## 2021-04-06 RX ORDER — ACETAMINOPHEN 325 MG/1
650 TABLET ORAL EVERY 4 HOURS PRN
Status: DISCONTINUED | OUTPATIENT
Start: 2021-04-09 | End: 2021-04-10 | Stop reason: HOSPADM

## 2021-04-06 RX ORDER — ONDANSETRON 2 MG/ML
4 INJECTION INTRAMUSCULAR; INTRAVENOUS EVERY 6 HOURS PRN
Status: DISCONTINUED | OUTPATIENT
Start: 2021-04-06 | End: 2021-04-06

## 2021-04-06 RX ORDER — ESMOLOL HYDROCHLORIDE 10 MG/ML
INJECTION INTRAVENOUS PRN
Status: DISCONTINUED | OUTPATIENT
Start: 2021-04-06 | End: 2021-04-06

## 2021-04-06 RX ORDER — PROCHLORPERAZINE MALEATE 5 MG
5 TABLET ORAL EVERY 6 HOURS PRN
Status: DISCONTINUED | OUTPATIENT
Start: 2021-04-06 | End: 2021-04-10 | Stop reason: HOSPADM

## 2021-04-06 RX ORDER — NITROGLYCERIN 0.4 MG/1
0.4 TABLET SUBLINGUAL EVERY 5 MIN PRN
Status: DISCONTINUED | OUTPATIENT
Start: 2021-04-06 | End: 2021-04-07

## 2021-04-06 RX ORDER — ACETAMINOPHEN 325 MG/1
975 TABLET ORAL EVERY 8 HOURS
Status: DISPENSED | OUTPATIENT
Start: 2021-04-06 | End: 2021-04-09

## 2021-04-06 RX ORDER — AMOXICILLIN 250 MG
1 CAPSULE ORAL 2 TIMES DAILY
Status: DISCONTINUED | OUTPATIENT
Start: 2021-04-06 | End: 2021-04-08

## 2021-04-06 RX ORDER — CEFAZOLIN SODIUM 2 G/100ML
2 INJECTION, SOLUTION INTRAVENOUS SEE ADMIN INSTRUCTIONS
Status: DISCONTINUED | OUTPATIENT
Start: 2021-04-06 | End: 2021-04-06 | Stop reason: HOSPADM

## 2021-04-06 RX ORDER — SODIUM CHLORIDE 9 MG/ML
INJECTION, SOLUTION INTRAVENOUS CONTINUOUS
Status: DISCONTINUED | OUTPATIENT
Start: 2021-04-06 | End: 2021-04-09

## 2021-04-06 RX ADMIN — PROPOFOL 30 MG: 10 INJECTION, EMULSION INTRAVENOUS at 11:04

## 2021-04-06 RX ADMIN — PHENYLEPHRINE HYDROCHLORIDE 100 MCG: 10 INJECTION INTRAVENOUS at 09:25

## 2021-04-06 RX ADMIN — ROCURONIUM BROMIDE 50 MG: 10 INJECTION INTRAVENOUS at 09:11

## 2021-04-06 RX ADMIN — HEPARIN SODIUM 2000 UNITS: 1000 INJECTION INTRAVENOUS; SUBCUTANEOUS at 12:46

## 2021-04-06 RX ADMIN — MORPHINE SULFATE 2 MG: 2 INJECTION, SOLUTION INTRAMUSCULAR; INTRAVENOUS at 21:51

## 2021-04-06 RX ADMIN — SODIUM CHLORIDE, POTASSIUM CHLORIDE, SODIUM LACTATE AND CALCIUM CHLORIDE: 600; 310; 30; 20 INJECTION, SOLUTION INTRAVENOUS at 09:08

## 2021-04-06 RX ADMIN — CEFAZOLIN 1 G: 330 INJECTION, POWDER, FOR SOLUTION INTRAMUSCULAR; INTRAVENOUS at 20:33

## 2021-04-06 RX ADMIN — ROCURONIUM BROMIDE 20 MG: 10 INJECTION INTRAVENOUS at 10:28

## 2021-04-06 RX ADMIN — SODIUM CHLORIDE: 9 INJECTION, SOLUTION INTRAVENOUS at 09:20

## 2021-04-06 RX ADMIN — OXYCODONE HYDROCHLORIDE 5 MG: 5 TABLET ORAL at 20:29

## 2021-04-06 RX ADMIN — PHENYLEPHRINE HYDROCHLORIDE 100 MCG: 10 INJECTION INTRAVENOUS at 09:17

## 2021-04-06 RX ADMIN — Medication 5 MG: at 09:28

## 2021-04-06 RX ADMIN — MYCOPHENOLIC ACID 360 MG: 360 TABLET, DELAYED RELEASE ORAL at 20:29

## 2021-04-06 RX ADMIN — ACETAMINOPHEN 975 MG: 325 TABLET, FILM COATED ORAL at 17:55

## 2021-04-06 RX ADMIN — ROCURONIUM BROMIDE 10 MG: 10 INJECTION INTRAVENOUS at 11:20

## 2021-04-06 RX ADMIN — FENTANYL CITRATE 25 MCG: 50 INJECTION, SOLUTION INTRAMUSCULAR; INTRAVENOUS at 12:35

## 2021-04-06 RX ADMIN — FENTANYL CITRATE 25 MCG: 50 INJECTION, SOLUTION INTRAMUSCULAR; INTRAVENOUS at 12:16

## 2021-04-06 RX ADMIN — ASPIRIN 81 MG CHEWABLE TABLET 81 MG: 81 TABLET CHEWABLE at 17:56

## 2021-04-06 RX ADMIN — SODIUM CHLORIDE, SODIUM GLUCONATE, SODIUM ACETATE, POTASSIUM CHLORIDE AND MAGNESIUM CHLORIDE: 526; 502; 368; 37; 30 INJECTION, SOLUTION INTRAVENOUS at 12:05

## 2021-04-06 RX ADMIN — NITROGLYCERIN 0.4 MG: 0.4 TABLET SUBLINGUAL at 22:40

## 2021-04-06 RX ADMIN — PROPOFOL 110 MG: 10 INJECTION, EMULSION INTRAVENOUS at 09:11

## 2021-04-06 RX ADMIN — Medication 2 G: at 09:38

## 2021-04-06 RX ADMIN — DEXAMETHASONE SODIUM PHOSPHATE 4 MG: 4 INJECTION, SOLUTION INTRA-ARTICULAR; INTRALESIONAL; INTRAMUSCULAR; INTRAVENOUS; SOFT TISSUE at 09:40

## 2021-04-06 RX ADMIN — LIDOCAINE HYDROCHLORIDE 100 MG: 20 INJECTION, SOLUTION INFILTRATION; PERINEURAL at 09:11

## 2021-04-06 RX ADMIN — FENTANYL CITRATE 200 MCG: 50 INJECTION, SOLUTION INTRAMUSCULAR; INTRAVENOUS at 09:11

## 2021-04-06 RX ADMIN — HEPARIN SODIUM 2000 UNITS: 1000 INJECTION INTRAVENOUS; SUBCUTANEOUS at 12:14

## 2021-04-06 RX ADMIN — SCOPALAMINE 1 PATCH: 1 PATCH, EXTENDED RELEASE TRANSDERMAL at 20:42

## 2021-04-06 RX ADMIN — HYDROMORPHONE HYDROCHLORIDE 0.3 MG: 1 INJECTION, SOLUTION INTRAMUSCULAR; INTRAVENOUS; SUBCUTANEOUS at 14:45

## 2021-04-06 RX ADMIN — ROCURONIUM BROMIDE 10 MG: 10 INJECTION INTRAVENOUS at 11:53

## 2021-04-06 RX ADMIN — Medication 5 MG: at 09:45

## 2021-04-06 RX ADMIN — PROPOFOL 30 MG: 10 INJECTION, EMULSION INTRAVENOUS at 12:13

## 2021-04-06 RX ADMIN — PROPOFOL 30 MG: 10 INJECTION, EMULSION INTRAVENOUS at 10:02

## 2021-04-06 RX ADMIN — HYDROMORPHONE HYDROCHLORIDE 0.5 MG: 1 INJECTION, SOLUTION INTRAMUSCULAR; INTRAVENOUS; SUBCUTANEOUS at 13:00

## 2021-04-06 RX ADMIN — PROCHLORPERAZINE EDISYLATE 5 MG: 5 INJECTION INTRAMUSCULAR; INTRAVENOUS at 22:45

## 2021-04-06 RX ADMIN — Medication 5 MG: at 09:38

## 2021-04-06 RX ADMIN — ONDANSETRON 4 MG: 2 INJECTION INTRAMUSCULAR; INTRAVENOUS at 13:00

## 2021-04-06 RX ADMIN — PROPOFOL 30 MG: 10 INJECTION, EMULSION INTRAVENOUS at 09:57

## 2021-04-06 RX ADMIN — ROCURONIUM BROMIDE 5 MG: 10 INJECTION INTRAVENOUS at 12:50

## 2021-04-06 RX ADMIN — MIDAZOLAM 1 MG: 1 INJECTION INTRAMUSCULAR; INTRAVENOUS at 09:08

## 2021-04-06 RX ADMIN — ESMOLOL HYDROCHLORIDE 20 MG: 10 INJECTION, SOLUTION INTRAVENOUS at 12:11

## 2021-04-06 RX ADMIN — HEPARIN SODIUM 2000 UNITS: 1000 INJECTION INTRAVENOUS; SUBCUTANEOUS at 11:46

## 2021-04-06 RX ADMIN — SODIUM CHLORIDE: 9 INJECTION, SOLUTION INTRAVENOUS at 14:56

## 2021-04-06 RX ADMIN — SUGAMMADEX 200 MG: 100 INJECTION, SOLUTION INTRAVENOUS at 13:52

## 2021-04-06 RX ADMIN — ESMOLOL HYDROCHLORIDE 20 MG: 10 INJECTION, SOLUTION INTRAVENOUS at 11:52

## 2021-04-06 RX ADMIN — HEPARIN SODIUM 5000 UNITS: 1000 INJECTION INTRAVENOUS; SUBCUTANEOUS at 10:44

## 2021-04-06 RX ADMIN — SODIUM CHLORIDE, SODIUM GLUCONATE, SODIUM ACETATE, POTASSIUM CHLORIDE AND MAGNESIUM CHLORIDE: 526; 502; 368; 37; 30 INJECTION, SOLUTION INTRAVENOUS at 12:06

## 2021-04-06 RX ADMIN — ATORVASTATIN CALCIUM 20 MG: 20 TABLET, FILM COATED ORAL at 17:56

## 2021-04-06 RX ADMIN — ESMOLOL HYDROCHLORIDE 20 MG: 10 INJECTION, SOLUTION INTRAVENOUS at 12:10

## 2021-04-06 RX ADMIN — PROPOFOL 40 MG: 10 INJECTION, EMULSION INTRAVENOUS at 11:47

## 2021-04-06 RX ADMIN — ESMOLOL HYDROCHLORIDE 10 MG: 10 INJECTION, SOLUTION INTRAVENOUS at 11:53

## 2021-04-06 ASSESSMENT — MIFFLIN-ST. JEOR
SCORE: 891.25
SCORE: 914.25

## 2021-04-06 ASSESSMENT — ACTIVITIES OF DAILY LIVING (ADL): ADLS_ACUITY_SCORE: 14

## 2021-04-06 ASSESSMENT — LIFESTYLE VARIABLES: TOBACCO_USE: 1

## 2021-04-06 NOTE — ANESTHESIA POSTPROCEDURE EVALUATION
Patient: Maribel Yang    Procedure(s):  Endovascular Abdominal Aortic Aneurysm Repair with Aortouniiliac Device and Femoral-Femoral Artery Bypass with [id number] Artegraft    Diagnosis:Abdominal aortic aneurysm (AAA) without rupture (H) [I71.4]  Diagnosis Additional Information: No value filed.    Anesthesia Type:  MAC    Note:  Disposition: Admission   Postop Pain Control: Uneventful            Sign Out: Well controlled pain   PONV: No   Neuro/Psych: Uneventful            Sign Out: Acceptable/Baseline neuro status   Airway/Respiratory: Uneventful            Sign Out: Acceptable/Baseline resp. status   CV/Hemodynamics: Uneventful            Sign Out: Acceptable CV status   Other NRE: NONE   DID A NON-ROUTINE EVENT OCCUR? No         Last vitals:  Vitals:    04/06/21 1445 04/06/21 1500 04/06/21 1514   BP: (!) 140/90 (!) 149/104    Pulse: 64 65    Resp: 12 20    Temp:  36.5  C (97.7  F)    SpO2:  97% 96%       Last vitals prior to Anesthesia Care Transfer:  CRNA VITALS  4/6/2021 1332 - 4/6/2021 1432      4/6/2021             ART BP:  (!) 210/193    ART Mean:  199    Resp Rate (observed):  (!) 7          Electronically Signed By: Brandon Burton MD  April 6, 2021  3:35 PM

## 2021-04-06 NOTE — OR NURSING
Prolonged bleeding at left radial art line site on removal. Held pressure >30 minutes. Notified Dr. Burton and Dr. Carrasco MDA. Continue to hold pressure. Radial pulse present and hand warm. Patient denies numbness or tingling.       Addendum: Bedside report to Sindy WALL RN on 6B. Left radial site marked. Bruised though soft. Radial pulse present. Patient denies numbness or tingling.

## 2021-04-06 NOTE — ANESTHESIA PREPROCEDURE EVALUATION
Anesthesia Evaluation     . Pt has had prior anesthetic. Type: General and MAC.    History of anesthetic complications   - PONV.        ROS/MED HX    ENT/Pulmonary: Comment: 10.5 pack year smoking history    (+) tobacco use, Past use, Other pulmonary disease, History of lung cancer s/p radiation.    Neurologic:  - neg neurologic ROS     Cardiovascular: Comment: Claudication, on Petal and Plavix    (+) Dyslipidemia hypertension-range: 140s/60-80s/ Peripheral Vascular Disease-- Other. CAD (Nonobstructive ) ---Previous cardiac testing   Echo: Date: Results:    Stress Test: Date: 2014 Results:    ECG Reviewed: Date: 3/2018 Results:  SB  Cath: Date: 2004 Results:  Nonobstructive CAD      METS/Exercise Tolerance:  3 - Able to walk 1-2 blocks without stopping   Hematologic:  - neg hematologic  ROS   (+) History of blood clots, pt is not anticoagulated,    (-) History of Transfusion   Musculoskeletal:  - neg musculoskeletal ROS       GI/Hepatic:     (+) Other GI/Hepatic chronic diarrhea and weight loss       Renal/Genitourinary: Comment: Alport disease    (+) renal disease, type: ESRD, Pt does not require dialysis, Pt has history of transplant, date: 2004,       Endo:     (+) Chronic steroid usage for Post Transplant Immunosuppression.       Psychiatric:  - neg psychiatric ROS    (-) chronic opioid use history   Infectious Disease:  - neg infectious disease ROS       Malignancy: (+) Malignancy, History of Lung, Other and Skin.  Lung CA Remission status post Radiation.  Skin CA Remission status post Surgery.  Other CA anal dysplasia, TAL III Active status post Surgery.      Other:    (+) , no H/O Chronic Pain,                   Physical Exam  Normal systems: cardiovascular, pulmonary and dental    Airway   Mallampati: II  TM distance: >3 FB  Neck ROM: full    Dental     Cardiovascular   Rhythm and rate: regular and normal      Pulmonary                PAC Discussion and Assessment    ASA Classification: 3  Case is suitable  for: [place]  Anesthetic techniques and relevant risks discussed: GA  Invasive monitoring and risk discussed: Yes                PAC Resident/NP Anesthesia Assessment: Maribel Yang is a 66 yo female scheduled for Exam Under Anesthesia, Colposcopy, CO2 Laser Of Vagina And Cervix on 9/25/2018 by Dr. Garcia in treatment of YUNIOR III     Previous anesthesia with PONV    1) Cardiac: HTN, well managed. EKG 3/2018 sinus clinton. PAD with claudication, maintained on Plavix and pletal.  2) Pulmonary: Smoked 0.3 PPD for 35 years, quit 2014. Lung cancer in 2014, s/p radiation  3) Heme: single incident of DVT at age 17, no recurrence   4) : ESRD r/t Alport's disease, s/p kidney transplant in 2004. 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  She is scheduled for surgery on 9/25/18 with Dr. Garcia for Exam Under Anesthesia, Colposcopy, CO2 Laser Of Vagina And Cervix.      History of allergies/adverse drug reactions to the following opioid pain medications:   ultracet (tramadol/acetaminophen) - hives  Hydrocodone -  n/v, hives  Fentanyl - nausea in past, but has had subsequently and did tolerate fentanyl and is open to using this for the upcoming procedure if needed     She has tolerated the following opioid pain medications in the past:   Oxycodone, morphine, codeine  Patient thinks she has tolerated hydromorphone in the  past as well.     Assessment/Plan:  --    For her procedure recommend multimodal pain management approach and avoiding tramadol, hydrocodone.    --    Consider using fentanyl IV or oxycodone PO if opioid pain medications are necessary.  Note, consider avoiding morphine given renal function.   --    Monitor closely for any allergic reaction or adverse drug reaction and withhold offending medication and treat reaction as necessary.   --    Defer final mediation selection to anesthesia and surgery team.     If there are any further questions regarding this note or further recommendations are needed please contact the inpatient pain management service.  Inpatient Pain Service contact info: pager [phone] from 7AM - 3 PM Mon-Fri or call [phone] for the on-call pain specialist after hours, weekends and holidays.     Romulo Michel  September 19, 2018  Reviewed and Signed by PAC Pharmacist  Pharmacist: LESA  Date: 9/19/18  Time: 1306      Anesthesia Plan     ASA Status:  4    H&P reviewed: Unable to attach H&P to encounter due to EHR limitations. H&P Update: appropriate H&P reviewed, patient examined. No interval changes since H&P (within 30 days).     NPO Status: > 8 hours  Anesthesia Plan Type Discussed: MAC  Justification for MAC: Deep or markedly invasive procedure (G8)Maintenance: Balanced.  Other Equipment: 2nd IV and Arterial Line  PONV Management: Ondansetron (or other 5HT-3)      Postoperative Care  Pain management: IV analgesics.    Consents  Anesthetic plan, risks, benefits and alternatives discussed with:  Patient.  Use of blood products discussed: Yes. Use of blood products discussed: Yes.     - Discussed with: Patient.     - Consented: consented to blood products       - Consented: consented to blood products  .                          .    Anesthesia Evaluation   Pt has had prior anesthetic. Type: General and MAC.    History of anesthetic complications  - PONV.      ROS/MED HX  ENT/Pulmonary:  Comment: 10.5 pack year smoking history    (+) tobacco use, Past use, Other pulmonary disease, History of lung cancer s/p radiation.    Neurologic:  - neg neurologic ROS     Cardiovascular: Comment: Claudication, on Petal and Plavix    (+) Dyslipidemia hypertension-range: 140s/60-80s/ Peripheral Vascular Disease-- Other. CAD (Nonobstructive ) ---Previous cardiac testing   Echo: Date: Results:    Stress Test: Date: 2014 Results:    ECG Reviewed: Date: 3/2018 Results:  SB  Cath: Date: 2004 Results:  Nonobstructive CAD    METS/Exercise Tolerance: 3 - Able to walk 1-2 blocks without stopping    Hematologic:  - neg hematologic  ROS   (+) History of blood clots, pt is not anticoagulated,  (-) History of Transfusion   Musculoskeletal:  - neg musculoskeletal ROS     GI/Hepatic:     (+) Other GI/Hepatic chronic diarrhea and weight loss     Renal/Genitourinary: Comment: Alport disease    (+) renal disease, type: ESRD, Pt does not require dialysis, Pt has history of transplant, date: 2004,     Endo:     (+) Chronic steroid usage for Post Transplant Immunosuppression.     Psychiatric/Substance Use:  - neg psychiatric ROS  (-) chronic opioid use history   Infectious Disease:  - neg infectious disease ROS     Malignancy:   (+) Malignancy, History of Lung, Other and Skin.  Lung CA Remission status post Radiation.  Skin CA Remission status post Surgery.  Other CA anal dysplasia, TAL III Active status post Surgery.    Other:      (+) , no H/O Chronic Pain, (-) Any chance pregnant and Other Significant Disability         Physical Exam    Airway        Mallampati: II   TM distance: >3 FB   Neck ROM: full     Respiratory Devices and Support         Dental  no notable dental history         Cardiovascular   cardiovascular exam normal       Rhythm and rate: regular and normal     Pulmonary   pulmonary exam normal                  Anesthesia Plan    ASA Status:  4      Anesthesia Type: MAC.     - Reason for MAC: Deep or markedly invasive  procedure (G8)      Maintenance: Balanced.   Techniques and Equipment:     - Lines/Monitors: 2nd IV, Arterial Line     Consents    Anesthesia Plan(s) and associated risks, benefits, and realistic alternatives discussed. Questions answered and patient/representative(s) expressed understanding.     - Discussed with:  Patient      - Extended Intubation/Ventilatory Support Discussed: No.      - Patient is DNR/DNI Status: No    Use of blood products discussed: Yes.     - Discussed with: Patient.     - Consented: consented to blood products     Postoperative Care    Pain management: IV analgesics.   PONV prophylaxis: Ondansetron (or other 5HT-3)     Comments:         H&P reviewed: Unable to attach H&P to encounter due to EHR limitations. H&P Update: appropriate H&P reviewed, patient examined. No interval changes since H&P (within 30 days).

## 2021-04-06 NOTE — ANESTHESIA PROCEDURE NOTES
Arterial Line Procedure Note  Pre-Procedure   Staff -        Other Anesthesia Staff: Daniella Amaral       Performed By: MOHAN       Location: OR       Procedure Start/Stop Times: 4/6/2021 9:17 AM and 4/6/2021 9:27 AM       Pre-Anesthestic Checklist: patient identified, IV checked, risks and benefits discussed, informed consent, monitors and equipment checked, pre-op evaluation and at physician/surgeon's request  Timeout:       Correct Patient: Yes        Correct Procedure: Yes        Correct Site: Yes        Correct Position: Yes   Procedure   Procedure: arterial line       Laterality: left       Insertion Site: radial.  Sterile Prep        Standard elements of sterile barrier followed       Skin prep: Chloraprep  Insertion/Injection        Technique: ultrasound guided        - Artery evaluated via U/S for patency/adequacy of catheter insertion is adequate, and using realtime U/S imaging the artery was punctured, and needle was observed entering artery on U/S       - Permanent Image entered into patient's record       - The visualized structures were anatomically normal.       - There were no apparent abnormal pathologic findings       Catheter Type/Size: 20 G, 1.75 in/4.5 cm quick cath (integral wire)  Narrative         Secured by: other       Tegaderm dressing used.       Complications: None apparent,        Arterial waveform: Yes        IBP within 10% of NIBP: Yes

## 2021-04-06 NOTE — ANESTHESIA PROCEDURE NOTES
Airway       Patient location during procedure: OR       Procedure Start/Stop Times: 4/6/2021 9:13 AM  Staff -        Other Anesthesia Staff: Daniella Amaral       Performed By: SRNA  Consent for Airway        Urgency: elective  Indications and Patient Condition       Indications for airway management: keri-procedural       Induction type:intravenous       Mask difficulty assessment: 1 - vent by mask    Final Airway Details       Final airway type: endotracheal airway       Successful airway: ETT - single  Endotracheal Airway Details        ETT size (mm): 6.5       Cuffed: yes       Successful intubation technique: direct laryngoscopy       DL Blade Type: Hernandez 2       Grade View of Cords: 1       Adjucts: stylet       Position: Right       Measured from: gums/teeth       Secured at (cm): 23       Bite block used: None    Post intubation assessment        Placement verified by: capnometry, equal breath sounds and chest rise        Number of attempts at approach: 1       Number of other approaches attempted: 0       Secured with: silk tape       Ease of procedure: easy       Dentition: Intact and Unchanged    Medication(s) Administered   Medication Administration Time: 4/6/2021 9:13 AM

## 2021-04-06 NOTE — BRIEF OP NOTE
[facility]    Brief Operative Note    Pre-operative diagnosis: Abdominal aortic aneurysm (AAA) without rupture (H) [I71.4]  Post-operative diagnosis Abdominal aortic aneurysm without rupture     Procedure:   1) EVAR with aorto uni-iliac graft - 32 x 14 x 102 AUI and extended into the left common iliac artery with a 16 x 16 x 93 limb extension;   2) Required proximal aortic cuff - 32 mm and 16 x 16 x 82 distal extension   3) Placement of 8 and 16 mm Amplatzer plug in the right common iliac artery  4) Left common femoral to right common femoral bypass using 7 mm Artegraft       Surgeon: Surgeon(s) and Role:     * Abena Ferrara MD - Primary     * Devendra Salgado MD - Fellow - Assisting  Anesthesia: General   Estimated blood loss: 100 mL   Urine output: 400 mL     Contrast: 65 ml  Dose: 207 mGy  Fluoro: 42.6 minutes     Drains: None  Specimens: None   Findings: After placement of AUI and limb extension, there was a Type 1A endoleak requiring a 32 mm proximal aortic cuff. We also placed a distal extension into the left common iliac artery due to concern for a Tyle 1B endoleak. There was a Type 2 endoleak from the right common iliac artery requiring placement of a 16 mm Amplatzer plug in the right common iliac artery in addition to the previous 8 mm Amplatzer plug.     Left common femoral to right common femoral/profunda femoris bypass using 7 mm Artegraft. Patient with right monophasic DP, PT signals and left multiphasic DP signal at the end of the case.     Complications: None.  Implants:   Implant Name Type Inv. Item Serial No.  Lot No. LRB No. Used Action   GRAFT ARTERY BOVINE CAROTID ARTEGRAFT [id number]  Bone/Tissue/Biologic GRAFT ARTERY BOVINE CAROTID ARTEGRAFT [id number]   [facility] [place] [id number] N/A 1 Implanted   STENT GRAFT ENDURANT II AORTIC-ILIAC UNI [id number] Graft STENT GRAFT ENDURANT II AORTIC-ILIAC UNI [id number]  [employer]  N/A 1 Implanted   STENT GRAFT ENDURANT II CONTRALATERAL LIMB [id number] Graft STENT GRAFT ENDURANT II CONTRALATERAL LIMB [id number] [employer] N/A Left 1 Implanted   AORTIC EXTENSION ENDURANT STENT GRAFT SYSTEM   [id number]  N/A Left 1 Implanted   STENT GRAFT ENDURANT II CONTRALATERAL LIMB [id number] Graft STENT GRAFT ENDURANT II CONTRALATERAL LIMB [id number] [employer]  Left 1 Implanted   IMP OCCLUDER PLUG VASC AMPLATZER ORIGINAL 16MM [id number] Vascular Plug IMP OCCLUDER PLUG VASC AMPLATZER ORIGINAL 16MM [id number]  [employer] [id number] Right 1 Implanted   IMP OCCLUDER PLUG VASC AMPLATZER 4 7MM [id number] Vascular Plug IMP OCCLUDER PLUG VASC AMPLATZER 4 7MM [id number]  [employer] [id number] Right 1 Implanted

## 2021-04-06 NOTE — TELEPHONE ENCOUNTER
Received a voicemail call back from Suzanne at [employer]. She states that the MD section is dated as 2020 in error. They cannot process the application since it is . Routing to [facility], please send updated MD section.

## 2021-04-06 NOTE — OP NOTE
Date: April 6, 2021    Pre-operative Diagnosis: 5.9 cm  AAA      Post-operative Diagnosis: Same      Procedure(s): 1.  Endovascular abdominal aortic aneurysm repair with an aorto uniiliac 32 mm Medtronic device 2.  Left ipsilateral limb placement 16 mm x 93 mm, 3.  32 mm x 49 mm proximal aortic extension cuff 4.  16 mm x 16 mm x 82 mm left iliac extension, 5.  7 mm and 16 mm Amplatz plugs in the right common iliac artery, 6.  Femoral-femoral bypass with 7 mm Artegraft 7.  Multiple aortograms      Surgeon: Abena Ferrara MD      Assistant: Devendra Salgado MD      Anesthesia:  General       Contrast: 65 ml     Radiation: 207 mGy      Indications: This 70 year old female with a left pelvic fossa transplanted kidney was found to have a 5.9 cm AAA and an occluded right external iliac artery.  There was antegrade arterial flow into the right common iliac artery which emptied into the internal iliac artery and reconstituted the profunda femoris artery with a right femoral artery occlusion. I recommended endovascular AAA repair via a aorto uniiliac endovascular repair and a femorofemoral bypass along with plugging of the right common iliac artery in order to prevent endoleak.. She understood the risks and benefits and wished to proceed.      Findings: Abdominal Aortic Aneurysm      Complications: None      Estimated Blood Loss:  100 mL      Drains: None      Specimens: None      Procedure Details: The patient was brought to the hybrid operating room and placed in the supine position. After monitoring leads were placed and general anesthesia achieved the bilateral groins were prepped and draped in a sterile fashion.  Oblique incisions were made in the groin and the common femoral arteries dissected free from the surrounding structures and looped proximally with an umbilical tape and distally with Vesseloops.  A subcutaneous tunnel in the suprapubic space was then made from the right to left groin to accommodate a 7 mm Artegraft  which was tunneled through the area.  Micropuncture access was gained in the left common femoral artery and an 035 Bentson wire advanced into the abdominal aorta.  A 5 Martiniquais Omni Flush catheter was initially used to attempt cannulation of the right common iliac artery however there was significant calcification that prevented this.  I thus felt that we could proceed with the endovascular aortic aneurysm repair then place an Amplatz plug adjacent to the aorto unidevice to exclude the common iliac artery.  With this in mind we proceeded with placing an 035 Amplatz Super Stiff wire in the descending thoracic aorta via use of the 5 Martiniquais Omni Flush catheter.  The patient was systemically heparinized with 5000 as of IV heparin.  The micropuncture sheath was then exchanged for a 32 mm x 32 mm x 102 mm Medtronic endurance to stent graft after aortogram had been performed.  This was deployed at the level of the renal arteries.  The device delivery system was then removed and exchanged for a 16 Martiniquais dry seal sheath.  Through this 16 Martiniquais dry seal sheath, I buddied an 035 angle-tip stiff Glidewire into the aortic sac separate from the Amplatz wire.  Iliac angiogram was then performed highlighting the iliac bifurcation on the left and a 16 mm x 16 mm x 93 mm ipsilateral limb placed.  The 035 Glidewire I had bloodied alongside the Amplatz wire was still in place and I advanced a 7 Martiniquais  sheath over this Glidewire to the left common iliac artery origin behind the endovascular graft.  A 7 mm x 12 mm Amplatzer vascular plug 4 was then deployed.  All fixation points to the aortic stent graft were then ballooned.  An aortogram revealed a type Ia endoleak and thus I noted there still was approximately 10 mm where we could advance proximally and thus an additional 32 mm x 32 mm x 49 mm aortic stent graft was then deployed.  I also advanced a ipsilateral limb distal extension, 16 mm x 16 mm x 82 mm as the distal  fixation point was quite proximal in the common iliac artery due to calcification and angulation.  All fixation points were then ballooned and no type Ia or type Ib endoleak was appreciated though it did appear to be a type II endoleak through a small branch that appeared to be a lumbar coming from the right.  On delayed images we can still see the right common iliac artery highlighting and I felt that an additional plug would need to be placed.  I was unsuccessful in getting behind the stent grafts and thus went ahead and directly punctured the occluded proximal right common femoral artery which was opened and dissected in preparation for femoral-femoral bypass.  I was able to advance a Glidewire through this occluded artery and gain access into the right common iliac artery.  Once again the 7 Divehi  was advanced to the right common iliac artery which had a 7 mm plug in its origin.  I deployed an additional 16mm Amplatz plug then in the right common iliac artery.  The  sheath and wire were removed and a figure-of-eight 5-0 Prolene suture used to close the common femoral artery which was occluded but had some small bleeding present.    We then went on to remove all sheaths from the left common femoral artery and an end-to-side anastomosis was constructed to the 7 mm Artegraft which had been previously tunneled.  The anastomosis was constructed to the left common femoral artery using 5-0 Prolene suture.  Prior to completion anastomosis antegrade and retrograde flushing was performed.  The graft was also flushed.  The distal anastomosis was constructed to the femoral bifurcation down onto the profunda femoris artery on the right.  There did appear to be some patency of the origin of the right superficial femoral artery as well.  This was also constructed in an end-to-side fashion with 5-0 Prolene suture.  Prior to completion anastomosis antegrade and retrograde flushing was performed.  There were  excellent Doppler signals bilaterally in the distal common femoral arteries as well as profunda femoris arteries bilaterally.  Given that I had tracked a 7 Ecuadorean sheath successfully through the occluded right external iliac artery, I was concerned that retrograde flow could occur up into the right common iliac artery and potentially cause a type II endoleak.  I thus placed a 2-0 silk tie to occlude the proximal right common femoral artery at the level of the inguinal ligament.    At this point once hemostasis was achieved, all incisions were closed with running 3-0 Vicryl suture followed by 4-0 Monocryl and skin glue.  The patient had warm feet with Doppler signals bilaterally.  20 cc of quarter percent plain Marcaine was injected into each groin incision for postoperative analgesia.  A Villavicencio catheter which has been placed at the start of the procedure was removed.     Sponge, needle and instrument count was reported as correct at the end of the case. I was present throughout the entire portion of the procedure.           Abena Ferrara MD, FACS, RPVI  Director, [facility]  Chief, Vascular and Endovascular Surgery  [facility]  [email or web address]      [facility] ENDOVASCULAR AAA REPAIR    Unfit for Open AAA Repair?: Yes. Reason unfit: hostile abdomen.  Maximum AAA Diameter: 59 mm  Right Iliac Aneurysm: none  Left Iliac Aneurysm: none  Aortic Neck Length:  40 mm  Aortic Neck Diameter:  26 mm  Aorta-Neck Length: <45 degrees  Neck-AAA Angle: <45 degrees    Procedure Information    Right-sided access: Open femoral, transverse  Right-sided iliac adjunct: Amplatz plug in the right common iliac artery    Left-sided access: Open femoral, transverse    Left-sided iliac adjunct:  Femoral-femoral bypass    Aortic main device: [id number]-rhaal, left. Femoral to femoral bypass?: 7mm Artegraft    Right iliac endpoint: Occluded with AMplatz  Left iliac endpoint:  Common    Devices  ______________________________________________________________    Main Aortic Device : Medtronic 32mm AUI  Device Diameter: 32 mm  Device Length:  102 mm +93 mm +82 mm distal extension +49 mm proximal extension  ______________________________________________________________    Number of Proximal Aortic Extensions: 1    ______________________________________________________________    Number of RIGHT Iliac Devices:  none  ______________________________________________________________    Number of LEFT Iliac Devices:  1.  Distal endpoint: Common    ______________________________________________________________    Intraoperative Complications/Endoleak:     Endoleak at completion?: lumbar branch (II)  Renal artery coverage?:  not covered  Iliac artery injury?: none  Iliac/Femoral Thrombectomy?: No  Distal Embolectomy?: no  Conversion to open repair?:  No  Other complications?: No

## 2021-04-06 NOTE — Clinical Note
Stent deployed in the proximal right coronary artery. Max pressure = 12 ingrid. Total duration = 10 seconds.

## 2021-04-06 NOTE — ANESTHESIA CARE TRANSFER NOTE
Patient: Maribel Yang    Procedure(s):  Endovascular Abdominal Aortic Aneurysm Repair with Aortouniiliac Device and Femoral-Femoral Artery Bypass with [id number] Artegraft    Diagnosis: Abdominal aortic aneurysm (AAA) without rupture (H) [I71.4]  Diagnosis Additional Information: No value filed.    Anesthesia Type:   MAC     Note:    Oropharynx: spontaneously breathing  Level of Consciousness: drowsy  Oxygen Supplementation: nasal cannula  Level of Supplemental Oxygen (L/min / FiO2): 3  Independent Airway: airway patency satisfactory and stable  Dentition: dentition unchanged  Vital Signs Stable: post-procedure vital signs reviewed and stable  Report to RN Given: handoff report given  Patient transferred to: PACU  Comments: Pt extubated in the OR without incident or complications. Pt VSS upon arrival to the PACU. Pt has no c/o pain/N/V. Pt care report given to receiving RN.   Handoff Report: Identifed the Patient, Identified the Reponsible Provider, Reviewed the pertinent medical history, Discussed the surgical course, Reviewed Intra-OP anesthesia mangement and issues during anesthesia, Set expectations for post-procedure period and Allowed opportunity for questions and acknowledgement of understanding      Vitals: (Last set prior to Anesthesia Care Transfer)  CRNA VITALS  4/6/2021 1332 - 4/6/2021 1407      4/6/2021             ART BP:  (!) 210/193    ART Mean:  199    Resp Rate (observed):  (!) 7        Electronically Signed By: SANDRA Kim CRNA  April 6, 2021  2:07 PM

## 2021-04-06 NOTE — Clinical Note
The first balloon was inserted into the right coronary artery and proximal right coronary artery.Max pressure = 12 ingrid. Total duration = 10 seconds.

## 2021-04-07 ENCOUNTER — APPOINTMENT (OUTPATIENT)
Dept: OCCUPATIONAL THERAPY | Facility: CLINIC | Age: 71
DRG: 268 | End: 2021-04-07
Attending: INTERNAL MEDICINE
Payer: COMMERCIAL

## 2021-04-07 ENCOUNTER — APPOINTMENT (OUTPATIENT)
Dept: ULTRASOUND IMAGING | Facility: CLINIC | Age: 71
DRG: 268 | End: 2021-04-07
Attending: SURGERY
Payer: COMMERCIAL

## 2021-04-07 ENCOUNTER — APPOINTMENT (OUTPATIENT)
Dept: CARDIOLOGY | Facility: CLINIC | Age: 71
DRG: 268 | End: 2021-04-07
Attending: INTERNAL MEDICINE
Payer: COMMERCIAL

## 2021-04-07 LAB
ALBUMIN SERPL-MCNC: 2.2 G/DL (ref 3.4–5)
ALP SERPL-CCNC: 93 U/L (ref 40–150)
ALT SERPL W P-5'-P-CCNC: 14 U/L (ref 0–50)
ANION GAP SERPL CALCULATED.3IONS-SCNC: 11 MMOL/L (ref 3–14)
ANION GAP SERPL CALCULATED.3IONS-SCNC: 6 MMOL/L (ref 3–14)
AST SERPL W P-5'-P-CCNC: 40 U/L (ref 0–45)
BASOPHILS # BLD AUTO: 0 10E9/L (ref 0–0.2)
BASOPHILS NFR BLD AUTO: 0.2 %
BILIRUB DIRECT SERPL-MCNC: <0.1 MG/DL (ref 0–0.2)
BILIRUB SERPL-MCNC: 0.2 MG/DL (ref 0.2–1.3)
BUN SERPL-MCNC: 20 MG/DL (ref 7–30)
BUN SERPL-MCNC: 22 MG/DL (ref 7–30)
CALCIUM SERPL-MCNC: 8.2 MG/DL (ref 8.5–10.1)
CALCIUM SERPL-MCNC: 8.4 MG/DL (ref 8.5–10.1)
CHLORIDE SERPL-SCNC: 108 MMOL/L (ref 94–109)
CHLORIDE SERPL-SCNC: 109 MMOL/L (ref 94–109)
CO2 SERPL-SCNC: 24 MMOL/L (ref 20–32)
CO2 SERPL-SCNC: 25 MMOL/L (ref 20–32)
CREAT SERPL-MCNC: 1.71 MG/DL (ref 0.52–1.04)
CREAT SERPL-MCNC: 1.74 MG/DL (ref 0.52–1.04)
DIFFERENTIAL METHOD BLD: ABNORMAL
EOSINOPHIL # BLD AUTO: 0 10E9/L (ref 0–0.7)
EOSINOPHIL NFR BLD AUTO: 0 %
ERYTHROCYTE [DISTWIDTH] IN BLOOD BY AUTOMATED COUNT: 14.1 % (ref 10–15)
ERYTHROCYTE [DISTWIDTH] IN BLOOD BY AUTOMATED COUNT: 14.4 % (ref 10–15)
GFR SERPL CREATININE-BSD FRML MDRD: 30 ML/MIN/{1.73_M2}
GFR SERPL CREATININE-BSD FRML MDRD: 30 ML/MIN/{1.73_M2}
GLUCOSE SERPL-MCNC: 133 MG/DL (ref 70–99)
GLUCOSE SERPL-MCNC: 135 MG/DL (ref 70–99)
HCT VFR BLD AUTO: 27.4 % (ref 35–47)
HCT VFR BLD AUTO: 29.6 % (ref 35–47)
HGB BLD-MCNC: 9 G/DL (ref 11.7–15.7)
HGB BLD-MCNC: 9.3 G/DL (ref 11.7–15.7)
IMM GRANULOCYTES # BLD: 0 10E9/L (ref 0–0.4)
IMM GRANULOCYTES NFR BLD: 0.4 %
INTERPRETATION ECG - MUSE: NORMAL
KCT BLD-ACNC: 290 SEC (ref 75–150)
LACTATE BLD-SCNC: 2.2 MMOL/L (ref 0.7–2)
LACTATE BLD-SCNC: 2.3 MMOL/L (ref 0.7–2)
LACTATE BLD-SCNC: 2.9 MMOL/L (ref 0.7–2)
LACTATE BLD-SCNC: 4 MMOL/L (ref 0.7–2)
LYMPHOCYTES # BLD AUTO: 0.5 10E9/L (ref 0.8–5.3)
LYMPHOCYTES NFR BLD AUTO: 5 %
MCH RBC QN AUTO: 28.4 PG (ref 26.5–33)
MCH RBC QN AUTO: 29.1 PG (ref 26.5–33)
MCHC RBC AUTO-ENTMCNC: 31.4 G/DL (ref 31.5–36.5)
MCHC RBC AUTO-ENTMCNC: 32.8 G/DL (ref 31.5–36.5)
MCV RBC AUTO: 89 FL (ref 78–100)
MCV RBC AUTO: 91 FL (ref 78–100)
MONOCYTES # BLD AUTO: 1.1 10E9/L (ref 0–1.3)
MONOCYTES NFR BLD AUTO: 11.4 %
NEUTROPHILS # BLD AUTO: 8.3 10E9/L (ref 1.6–8.3)
NEUTROPHILS NFR BLD AUTO: 83 %
NRBC # BLD AUTO: 0 10*3/UL
NRBC BLD AUTO-RTO: 0 /100
PLATELET # BLD AUTO: 117 10E9/L (ref 150–450)
PLATELET # BLD AUTO: 132 10E9/L (ref 150–450)
POTASSIUM SERPL-SCNC: 4 MMOL/L (ref 3.4–5.3)
POTASSIUM SERPL-SCNC: 4.3 MMOL/L (ref 3.4–5.3)
PROT SERPL-MCNC: 4.8 G/DL (ref 6.8–8.8)
RBC # BLD AUTO: 3.09 10E12/L (ref 3.8–5.2)
RBC # BLD AUTO: 3.27 10E12/L (ref 3.8–5.2)
SODIUM SERPL-SCNC: 140 MMOL/L (ref 133–144)
SODIUM SERPL-SCNC: 143 MMOL/L (ref 133–144)
TROPONIN I SERPL-MCNC: 4.7 UG/L (ref 0–0.04)
TROPONIN I SERPL-MCNC: 5.29 UG/L (ref 0–0.04)
WBC # BLD AUTO: 10 10E9/L (ref 4–11)
WBC # BLD AUTO: 10.9 10E9/L (ref 4–11)

## 2021-04-07 PROCEDURE — 85025 COMPLETE CBC W/AUTO DIFF WBC: CPT | Performed by: SURGERY

## 2021-04-07 PROCEDURE — 027034Z DILATION OF CORONARY ARTERY, ONE ARTERY WITH DRUG-ELUTING INTRALUMINAL DEVICE, PERCUTANEOUS APPROACH: ICD-10-PCS | Performed by: INTERNAL MEDICINE

## 2021-04-07 PROCEDURE — 93005 ELECTROCARDIOGRAM TRACING: CPT

## 2021-04-07 PROCEDURE — 97535 SELF CARE MNGMENT TRAINING: CPT | Mod: GO | Performed by: OCCUPATIONAL THERAPIST

## 2021-04-07 PROCEDURE — 84484 ASSAY OF TROPONIN QUANT: CPT | Performed by: SURGERY

## 2021-04-07 PROCEDURE — 250N000013 HC RX MED GY IP 250 OP 250 PS 637: Performed by: INTERNAL MEDICINE

## 2021-04-07 PROCEDURE — 97166 OT EVAL MOD COMPLEX 45 MIN: CPT | Mod: GO | Performed by: OCCUPATIONAL THERAPIST

## 2021-04-07 PROCEDURE — 80048 BASIC METABOLIC PNL TOTAL CA: CPT | Performed by: SURGERY

## 2021-04-07 PROCEDURE — 36415 COLL VENOUS BLD VENIPUNCTURE: CPT

## 2021-04-07 PROCEDURE — 84484 ASSAY OF TROPONIN QUANT: CPT

## 2021-04-07 PROCEDURE — 93979 VASCULAR STUDY: CPT | Mod: 26 | Performed by: RADIOLOGY

## 2021-04-07 PROCEDURE — 36415 COLL VENOUS BLD VENIPUNCTURE: CPT | Performed by: SURGERY

## 2021-04-07 PROCEDURE — 258N000003 HC RX IP 258 OP 636: Performed by: INTERNAL MEDICINE

## 2021-04-07 PROCEDURE — 99223 1ST HOSP IP/OBS HIGH 75: CPT | Mod: 25 | Performed by: INTERNAL MEDICINE

## 2021-04-07 PROCEDURE — 93010 ELECTROCARDIOGRAM REPORT: CPT | Mod: 59 | Performed by: INTERNAL MEDICINE

## 2021-04-07 PROCEDURE — 93979 VASCULAR STUDY: CPT

## 2021-04-07 PROCEDURE — 250N000013 HC RX MED GY IP 250 OP 250 PS 637: Performed by: SURGERY

## 2021-04-07 PROCEDURE — 83605 ASSAY OF LACTIC ACID: CPT | Performed by: SURGERY

## 2021-04-07 PROCEDURE — 255N000002 HC RX 255 OP 636: Performed by: INTERNAL MEDICINE

## 2021-04-07 PROCEDURE — 80076 HEPATIC FUNCTION PANEL: CPT | Performed by: SURGERY

## 2021-04-07 PROCEDURE — 258N000003 HC RX IP 258 OP 636: Performed by: SURGERY

## 2021-04-07 PROCEDURE — 250N000011 HC RX IP 250 OP 636: Performed by: INTERNAL MEDICINE

## 2021-04-07 PROCEDURE — 83605 ASSAY OF LACTIC ACID: CPT

## 2021-04-07 PROCEDURE — B2111ZZ FLUOROSCOPY OF MULTIPLE CORONARY ARTERIES USING LOW OSMOLAR CONTRAST: ICD-10-PCS | Performed by: INTERNAL MEDICINE

## 2021-04-07 PROCEDURE — 120N000003 HC R&B IMCU UMMC

## 2021-04-07 PROCEDURE — 93306 TTE W/DOPPLER COMPLETE: CPT | Mod: 26 | Performed by: INTERNAL MEDICINE

## 2021-04-07 PROCEDURE — 250N000012 HC RX MED GY IP 250 OP 636 PS 637: Performed by: SURGERY

## 2021-04-07 PROCEDURE — 85027 COMPLETE CBC AUTOMATED: CPT | Performed by: SURGERY

## 2021-04-07 PROCEDURE — 250N000011 HC RX IP 250 OP 636: Performed by: SURGERY

## 2021-04-07 PROCEDURE — 97530 THERAPEUTIC ACTIVITIES: CPT | Mod: GO | Performed by: OCCUPATIONAL THERAPIST

## 2021-04-07 PROCEDURE — 99232 SBSQ HOSP IP/OBS MODERATE 35: CPT | Mod: 25 | Performed by: INTERNAL MEDICINE

## 2021-04-07 PROCEDURE — 999N000208 ECHOCARDIOGRAM COMPLETE

## 2021-04-07 DEVICE — STENT SYNERGY DRUG ELUTING 3.00X20MM  H7493926020300: Type: IMPLANTABLE DEVICE | Status: FUNCTIONAL

## 2021-04-07 RX ORDER — NITROGLYCERIN 0.4 MG/1
0.4 TABLET SUBLINGUAL EVERY 5 MIN PRN
Status: DISCONTINUED | OUTPATIENT
Start: 2021-04-07 | End: 2021-04-07

## 2021-04-07 RX ORDER — NALOXONE HYDROCHLORIDE 0.4 MG/ML
0.2 INJECTION, SOLUTION INTRAMUSCULAR; INTRAVENOUS; SUBCUTANEOUS
Status: DISCONTINUED | OUTPATIENT
Start: 2021-04-07 | End: 2021-04-07

## 2021-04-07 RX ORDER — NALOXONE HYDROCHLORIDE 0.4 MG/ML
0.4 INJECTION, SOLUTION INTRAMUSCULAR; INTRAVENOUS; SUBCUTANEOUS
Status: DISCONTINUED | OUTPATIENT
Start: 2021-04-07 | End: 2021-04-07

## 2021-04-07 RX ORDER — HYDRALAZINE HYDROCHLORIDE 20 MG/ML
10 INJECTION INTRAMUSCULAR; INTRAVENOUS EVERY 4 HOURS PRN
Status: DISCONTINUED | OUTPATIENT
Start: 2021-04-07 | End: 2021-04-10 | Stop reason: HOSPADM

## 2021-04-07 RX ORDER — FLUMAZENIL 0.1 MG/ML
0.2 INJECTION, SOLUTION INTRAVENOUS
Status: ACTIVE | OUTPATIENT
Start: 2021-04-07 | End: 2021-04-07

## 2021-04-07 RX ORDER — ATROPINE SULFATE 0.1 MG/ML
0.5 INJECTION INTRAVENOUS
Status: ACTIVE | OUTPATIENT
Start: 2021-04-07 | End: 2021-04-07

## 2021-04-07 RX ORDER — FENTANYL CITRATE 50 UG/ML
25-50 INJECTION, SOLUTION INTRAMUSCULAR; INTRAVENOUS
Status: ACTIVE | OUTPATIENT
Start: 2021-04-07 | End: 2021-04-07

## 2021-04-07 RX ORDER — ATORVASTATIN CALCIUM 80 MG/1
80 TABLET, FILM COATED ORAL DAILY
Status: DISCONTINUED | OUTPATIENT
Start: 2021-04-07 | End: 2021-04-10 | Stop reason: HOSPADM

## 2021-04-07 RX ORDER — ONDANSETRON 2 MG/ML
4 INJECTION INTRAMUSCULAR; INTRAVENOUS EVERY 6 HOURS PRN
Status: DISCONTINUED | OUTPATIENT
Start: 2021-04-07 | End: 2021-04-10 | Stop reason: HOSPADM

## 2021-04-07 RX ORDER — LISINOPRIL 5 MG/1
5 TABLET ORAL DAILY
Status: DISCONTINUED | OUTPATIENT
Start: 2021-04-07 | End: 2021-04-07

## 2021-04-07 RX ORDER — METOPROLOL TARTRATE 1 MG/ML
5-10 INJECTION, SOLUTION INTRAVENOUS
Status: DISCONTINUED | OUTPATIENT
Start: 2021-04-07 | End: 2021-04-10 | Stop reason: HOSPADM

## 2021-04-07 RX ORDER — ONDANSETRON 4 MG/1
4 TABLET, ORALLY DISINTEGRATING ORAL EVERY 6 HOURS PRN
Status: DISCONTINUED | OUTPATIENT
Start: 2021-04-07 | End: 2021-04-10 | Stop reason: HOSPADM

## 2021-04-07 RX ORDER — ASPIRIN 81 MG/1
81 TABLET, CHEWABLE ORAL DAILY
Status: DISCONTINUED | OUTPATIENT
Start: 2021-04-08 | End: 2021-04-10 | Stop reason: HOSPADM

## 2021-04-07 RX ORDER — SODIUM CHLORIDE 9 MG/ML
INJECTION, SOLUTION INTRAVENOUS CONTINUOUS
Status: ACTIVE | OUTPATIENT
Start: 2021-04-07 | End: 2021-04-07

## 2021-04-07 RX ADMIN — SODIUM CHLORIDE: 9 INJECTION, SOLUTION INTRAVENOUS at 01:52

## 2021-04-07 RX ADMIN — TICAGRELOR 90 MG: 90 TABLET ORAL at 20:10

## 2021-04-07 RX ADMIN — SODIUM CHLORIDE: 9 INJECTION, SOLUTION INTRAVENOUS at 22:34

## 2021-04-07 RX ADMIN — Medication 1 TABLET: at 07:57

## 2021-04-07 RX ADMIN — HUMAN ALBUMIN MICROSPHERES AND PERFLUTREN 5 ML: 10; .22 INJECTION, SOLUTION INTRAVENOUS at 14:32

## 2021-04-07 RX ADMIN — ACETAMINOPHEN 975 MG: 325 TABLET, FILM COATED ORAL at 09:04

## 2021-04-07 RX ADMIN — METOPROLOL TARTRATE 100 MG: 50 TABLET, FILM COATED ORAL at 07:56

## 2021-04-07 RX ADMIN — MYCOPHENOLIC ACID 360 MG: 360 TABLET, DELAYED RELEASE ORAL at 07:57

## 2021-04-07 RX ADMIN — METOPROLOL TARTRATE 100 MG: 50 TABLET, FILM COATED ORAL at 20:09

## 2021-04-07 RX ADMIN — Medication 1 TABLET: at 20:09

## 2021-04-07 RX ADMIN — CEFAZOLIN 1 G: 330 INJECTION, POWDER, FOR SOLUTION INTRAMUSCULAR; INTRAVENOUS at 07:57

## 2021-04-07 RX ADMIN — LISINOPRIL 5 MG: 5 TABLET ORAL at 09:04

## 2021-04-07 RX ADMIN — MYCOPHENOLIC ACID 360 MG: 360 TABLET, DELAYED RELEASE ORAL at 20:10

## 2021-04-07 RX ADMIN — HEPARIN SODIUM 5000 UNITS: 5000 INJECTION, SOLUTION INTRAVENOUS; SUBCUTANEOUS at 07:57

## 2021-04-07 RX ADMIN — SODIUM CHLORIDE: 9 INJECTION, SOLUTION INTRAVENOUS at 07:32

## 2021-04-07 RX ADMIN — TICAGRELOR 90 MG: 90 TABLET ORAL at 07:57

## 2021-04-07 RX ADMIN — ONDANSETRON 4 MG: 2 INJECTION INTRAMUSCULAR; INTRAVENOUS at 16:40

## 2021-04-07 RX ADMIN — ATORVASTATIN CALCIUM 80 MG: 80 TABLET, FILM COATED ORAL at 07:57

## 2021-04-07 RX ADMIN — ONDANSETRON 4 MG: 2 INJECTION INTRAMUSCULAR; INTRAVENOUS at 09:03

## 2021-04-07 RX ADMIN — HEPARIN SODIUM 5000 UNITS: 5000 INJECTION, SOLUTION INTRAVENOUS; SUBCUTANEOUS at 15:32

## 2021-04-07 RX ADMIN — PREDNISONE 5 MG: 5 TABLET ORAL at 07:57

## 2021-04-07 RX ADMIN — ACETAMINOPHEN 975 MG: 325 TABLET, FILM COATED ORAL at 16:38

## 2021-04-07 ASSESSMENT — ACTIVITIES OF DAILY LIVING (ADL)
ADLS_ACUITY_SCORE: 14
ADLS_ACUITY_SCORE: 14
ADLS_ACUITY_SCORE: 13
PREVIOUS_RESPONSIBILITIES: MEAL PREP;HOUSEKEEPING;LAUNDRY;SHOPPING;YARDWORK;MEDICATION MANAGEMENT;FINANCES;DRIVING
ADLS_ACUITY_SCORE: 13
ADLS_ACUITY_SCORE: 14

## 2021-04-07 ASSESSMENT — MIFFLIN-ST. JEOR: SCORE: 916.25

## 2021-04-07 NOTE — PROGRESS NOTES
"Surgery [place] Note    I was called for rapid response that the patient was diaphoretic and hypotensive after moving from commode to bed.  Pt complaining of \"chest tightness, discomfort radiating to L arm, nausea, several episodes of emesis. Hypotensive while getting to bed, since resolved.    Systolics as low as 70s while HR in 30s. Now normotensive and bradycardic to 40-50s since moving back to bed.  Patient examined, sitting in bed, diaphoretic  Mild tachypnea on 4L O2  RLQ with firm, tender mass, remains confined to borders drawn by RN several hours prior  Bilateral groin incisions c/d/i  Doppler signals in b/l DPs    EKG with T wave inversion, repeat with ST elevation in inferior leads    Stat labs sent  Trop 5.364  Lactate 2.5    hgb 10.1, plts 132    Cr 1.61, pre-op 1.6    A/P: 69 yo female with h/o kidney transplant, AAA, occluded R external iliac artery, POD#0 s/p EVAR, right common iliac ampltaz plug, fem-fem bypass, now with acute STEMI.  - Stat cardiology consult, evaluated at bedside by cardiology fellow  - ASA  - Heparin gtt  - To cath lab with cardiology    Discussed with vascular surgery fellow, Dr. Salgado, and on-call vascular surgery attending, Dr. Callaway.    Larry Thornton MD        Addendum 0130: Patient assessed after stat cath procedure, R coronary artery stent. Resting comfortably. Vitals wnl. RLQ swelling dissipating, minimal tenderness. R cath access site with no active bleeding. Lactate 4.0, discussed with RRT provider that this is likely due to recent cath procedure, no further interventions, will recheck in 4 hours.   Georgiana Medina MD  Surgery PGY1    "

## 2021-04-07 NOTE — PROGRESS NOTES
CLINICAL NUTRITION SERVICES    Reason for Assessment:  Heart-healthy nutrition education, received consult.    Diet History:  Pt reports  Following heart healthy diet- denied any questions or concerns, labs reviewed    Nutrition Diagnosis:  No diagnosis at this time     Nutrition Prescription/Recs:  Continue heart-healthy diet.      Interventions:  Nutrition Education- encourage patient to continue to follow heart healthy diet and ask questions if they arise     Follow-up:   Patient to ask any further nutrition-related questions before discharge. In addition, pt may request outpatient RD appointment.    Yolis Mason RD, LD  6B pager: [phone]

## 2021-04-07 NOTE — PROGRESS NOTES
04/07/21 0900   Quick Adds   Type of Visit Initial Occupational Therapy Evaluation   Living Environment   People in home spouse   Current Living Arrangements house   Home Accessibility stairs to enter home;stairs within home   Number of Stairs, Main Entrance 3   Stair Railings, Main Entrance none   Number of Stairs, Within Home, Primary other (see comments)  (13)   Stair Railings, Within Home, Primary railing on left side (ascending)   Transportation Anticipated family or friend will provide;car, drives self  (daughter typically drives, pt does occasionally)   Living Environment Comments Pt has no AE, stands to shower in tub/shower.     works full time out of the home.    Self-Care   Usual Activity Tolerance good   Current Activity Tolerance moderate   Regular Exercise No   Equipment Currently Used at Home none   Activity/Exercise/Self-Care Comment Reports IND with ADLs and IADLs.     Disability/Function   Hearing Difficulty or Deaf no   Wear Glasses or Blind yes   Vision Management reading glasses   Fall history within last six months no   Change in Functional Status Since Onset of Current Illness/Injury yes   General Information   Onset of Illness/Injury or Date of Surgery 04/06/21   Referring Physician Dr Josias WHITING   Patient/Family Therapy Goal Statement (OT) get home   Additional Occupational Profile Info/Pertinent History of Current Problem 70 year old woman with a history of multiple malignancies, DVT on apixaban, PAD with fem-fem bypass and a history of AAA status post EVAR most recently on this admission POD #1 who presented last night with chest pain and found to have inferior STEMI, underwent an emergent coronary angiogram which revealed a proximal RCA lesion and was revascularized with a single drug eluting stent. There was only mild non obstructive CAD elsewhere.    Performance Patterns (Routines, Roles, Habits) has a craft room upstairs by her bedroom, retired   Existing Precautions/Restrictions  oxygen therapy device and L/min  (wrist in arm board)   General Observations and Info Pt fatigued, activity: up w A.  Trops trending down   Cognitive Status Examination   Orientation Status orientation to person, place and time   Affect/Mental Status (Cognitive) WFL   Cognitive Status Comments tired but appears intact   Visual Perception   Visual Impairment/Limitations corrective lenses for reading   Impact of Vision Impairment on Function (Vision) intact   Sensory   Sensory Quick Adds No deficits were identified   Pain Assessment   Patient Currently in Pain Yes, see Vital Sign flowsheet   Integumentary/Edema   Integumentary/Edema no deficits were identifed   Posture   Posture Comments poor posture in standing, hunched   Range of Motion Comprehensive   Comment, General Range of Motion WFL during ADLs, not formally tested   Strength Comprehensive (MMT)   Comment, General Manual Muscle Testing (MMT) Assessment NT 2/2 precautions   Coordination   Fine Motor Coordination WFL during ADLs seated   Bed Mobility   Bed Mobility supine-sit;sit-supine   Supine-Sit [place] (Bed Mobility) minimum assist (75% patient effort)   Sit-Supine [place] (Bed Mobility) minimum assist (75% patient effort)   Assistive Device (Bed Mobility) bed rails   Transfers   Transfer Comments close CGA bedside only   Balance   Balance Comments below baseline, feeling weakn   Activities of Daily Living   BADL Assessment/Intervention grooming;feeding;toileting   Grooming Assessment/Training   [place] Level (Grooming) set up   Position (Grooming) supported sitting   Eating/Self Feeding   [place] Level (Feeding) independent   Position (Self-Feeding) supported sitting   Toileting   [place] Level (Toileting) moderate assist (50% patient effort)   Position (Toileting) unsupported standing   Comment (Toileting) using BSC, CGA transfers, mod A keri care, min A clothing management   Instrumental Activities of Daily Living (IADL)    Previous Responsibilities meal prep;housekeeping;laundry;shopping;yardwork;medication management;finances;driving   IADL Comments reports she was IND, dtr drives for her occasionally   Clinical Impression   Criteria for Skilled Therapeutic Interventions Met (OT) yes   OT Diagnosis MI post op after AAA repair   OT Problem List-Impairments impacting ADL problems related to;activity tolerance impaired;balance;mobility;pain;postural control   Assessment of Occupational Performance 3-5 Performance Deficits   Identified Performance Deficits home management, bathroom transfers, dressing, bathing   Planned Therapy Interventions (OT) ADL retraining;IADL retraining;transfer training;home program guidelines;progressive activity/exercise;risk factor education;strengthening   Intervention Comments OT CR   Clinical Decision Making Complexity (OT) moderate complexity   Therapy Frequency (OT) Daily   Predicted Duration of Therapy 2 weeks   Risk & Benefits of therapy have been explained evaluation/treatment results reviewed;care plan/treatment goals reviewed;risks/benefits reviewed;current/potential barriers reviewed;participants voiced agreement with care plan;participants included;patient   OT Discharge Planning    OT Discharge Recommendation (DC Rec) Transitional Care Facility  (vs home w OP CR)   OT Rationale for DC Rec Pending progress may be able to disc to home. Unable to tolerate IND ADLs today and has 13 stairs to her bed and bath. Reports she stays on that main level much of the day but will need to get there when she gets home. No bath on main floor.   works.    OT Brief overview of current status  Pt fatigued quickly, VSS no dizziness during ADLs today.   Total Evaluation Time (Minutes)   Total Evaluation Time (Minutes) 5

## 2021-04-07 NOTE — PROGRESS NOTES
Rapid Response Team Note    Assessment   In assessment a rapid response was called on Maribel Yang due to lactic acidosis. This presentation is likely due to recent cardiac procedures (AAA repair and PCI) and worsened by malignancy.     Plan   -  Trend lactic as planned, every 4 hours X 3    -  The surgery primary team was at bedside.  -  Disposition: The patient will remain on the current unit. We will continue to monitor this patient closely.  -  Reassessment and plan follow-up will be performed by the primary team      SANDRA Magana CNP  [facility] RRT AMCOM Job Code Contact #[phone]    Hospital Course   Brief Summary of events leading to rapid response:   S/P AAA repair and subsequently developed hypotension and chest pain. Found to have EKG changes and had angiogram. RRT called for scheduled lactic of 4    Admission Diagnosis:   Abdominal aortic aneurysm (AAA) without rupture (H) [I71.4]     Physical Exam   Temp: 98.8  F (37.1  C) Temp  Min: 97.2  F (36.2  C)  Max: 98.8  F (37.1  C)  Resp: 17 Resp  Min: 11  Max: 20  SpO2: 99 % SpO2  Min: 88 %  Max: 100 %  Pulse: 66 Pulse  Min: 50  Max: 91    No data recorded  BP: (!) 144/82 Systolic (24hrs), Av , Min:73 , Max:171   Diastolic (24hrs), Av, Min:50, Max:104     I/Os: I/O last 3 completed shifts:  In: 2806.25 [P.O.:40; I.V.:2066.25]  Out: 1150 [Urine:500; Emesis/NG output:550; Blood:100]     Exam:   General: chronically ill appearing  Mental Status: AAOx4.      Significant Results and Procedures   Lactic Acid:   Recent Labs   Lab Test 21  0039 21  2144   LACT 4.0* 2.5*     CBC:   Recent Labs   Lab Test 21  2144 21  0726 21  1508 20  1228   WBC 9.5  --  5.8 6.0   HGB 10.1* 14.1 13.0 13.7   HCT 31.4*  --  40.4 43.5   *  --  287 228        Sepsis Evaluation   The patient is not known to have an infection.  NO EVIDENCE OF SEPSIS at this time.  Vital sign, physical exam, and lab findings are  likely due to recent surgeries.
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
## 2021-04-07 NOTE — PLAN OF CARE
PT / 6B - PT orders received. Pt OOR on first attempt and awaiting Echo on second attempt. Will check back as able otherwise reschedule per POC.

## 2021-04-07 NOTE — PROVIDER NOTIFICATION
04/07/21 0100   Call Information   Date of Call 04/07/21   Time of Call 0114   Name of person requesting the team Tom   Title of person requesting team RN   RRT Arrival time 0118   Time RRT ended 0130   Reason for call   Type of RRT Adult   Primary reason for call Sepsis suspected   Sepsis Suspected Elevated Lactate level   Was patient transferred from the ED, ICU, or PACU within last 24 hours prior to RRT call? No   SBAR   Situation LA 4.0   Background POD #1 for AAA repair. Just returned from Cath lab, stent placed after STEMI   Notable History/Conditions Cancer;Hypertension;Transplant;Recent surgery  (Kidney Transplant)   Assessment Pt in no apparent distress, sleepy/sedated (received versed in cath lab). Oriented. VSS.    Interventions Labs  (recheck LA in AM.)   Patient Outcome   Patient Outcome Stabilized on unit   RRT Team   Attending/Primary/Covering Physician Vascular Surgery   Date Attending Physician notified 04/07/21   Time Attending Physician notified 0114   Physician(s) Kiara Winston NP   Lead RN Dora Valenzuela   Post RRT Intervention Assessment   Post RRT Assessment Stable/Improved   Date Follow Up Done 04/07/21   Time Follow Up Done 0400

## 2021-04-07 NOTE — PROVIDER NOTIFICATION
04/06/21 2100   Call Information   Date of Call 04/06/21   Time of Call 2123   Name of person requesting the team Jazmyn   Title of person requesting team RN   RRT Arrival time 2128   Time RRT ended 2330   Reason for call   Type of RRT Adult   Primary reason for call Cardiovascular   Cardiovascular SBP less than 90;HR less than 40   Was patient transferred from the ED, ICU, or PACU within last 24 hours prior to RRT call? No   SBAR   Situation Hypotensive, bradycardia when up to bathroom.    Background P/O day 0 for AAA repair.    Notable History/Conditions Cancer;Hypertension;Transplant;Recent surgery  (Kidney transplant)   Assessment Pt lying in bed, c/o some chest pressure, HR clinton 48-50's, BP 80's sytstolic, denies lightheadedness/dizziness. A&Ox4. BP and HR improving, returning to baseline. Pt nauseous/vomiting.    Interventions ECG;Fluid bolus;Labs;Meds;Other (describe)  (Ultrasound, Morphine)   Adjustments to Recommend STEMI, pt to cath lab emergently   Patient Outcome   Patient Outcome   (Cath Lab)   RRT Team   Attending/Primary/Covering Physician Vascular Surgery   Date Attending Physician notified 04/06/21   Time Attending Physician notified 2123   Physician(s) Roxy Galvin NP, Georgiana Medina MD    Lead RN Dora Hodge   Post RRT Intervention Assessment   Post RRT Assessment Other (see comment)   Date Follow Up Done 04/07/21   Time Follow Up Done 0122   Comments Repeat RRT called, LA 4.0.

## 2021-04-07 NOTE — PLAN OF CARE
Neuro: A&Ox4. Lethargic.    Cardiac: SR with HR In 60s-70s.  BP 130s/60s-80s.  Afebrile.  Palpable radial pulses and dopplerable dorsalis pedis pulses.  Denies numbness/tingling in hands and feet. VSS.   Respiratory: Sating >92% on RA.  1L NC placed for a short time for patient comfort.  GI/: Adequate urine output via commode. Multiple small, loose stools that are red streaked; team aware.  Diet/appetite: NPO except meds.  PRN Zofran given once for nausea; effective.  Activity:  SBA to commode.  Got up in chair.  Board to RUE.  Pain: At acceptable level on current regimen.   Skin: No new deficits noted.  LDA's: Right PIV SL.  Left PIV infusing NS @ 75ml/hr.    Plan: Continue with POC. Notify primary team with changes.

## 2021-04-07 NOTE — PROGRESS NOTES
[facility]   Cardiology Consults  Progress Note       ASSESSMENT/PLAN:  70 year old year old female with PMH of kidney transplant in 2014, lung cancer in 2014 s/p SBRT, anal canal carcinoma s/p chemoradiation in 6/2018, infrarenal AAA, thoracic aneurysm, hypertension, DVT on eliquis presented for EVAR on 4/6/2021. Cardiology urgently consulted for STEMI on 4/6, now s/p coronary angiogram with stent to proximal RCA.     1. Inferior STEMI -- EKG showing ST elevation inferior leads with reciprocal ST depression in AVL.  Urgent coronary angiogram showing 95% proximal RCA stenosis, s/p LIUDMILA to proximal RCA.  2. Kidney transplant on cellcept  3. AAA s/p EVAR and fem-fem bypass on 4/7  4. DVT on Eliquis  5. Hx of lung cancer/anal cancer s/p chemoradiation  6. Hypertension     Plan:  -She is currently on aspirin and Brilinta.  She has been on Eliquis for DVT.  If the primary team is going to resume Eliquis this can be done safely with concomitant ticagrelor.  We recommend to stop aspirin if the primary team is going to resume Eliquis.  - Increase atorvastatin to 80 mg daily   - Start lisinopril 5 daily. Can stop home nifedipine to give room to increasing lisinopril. Can increase to 10 mg daily after 3 doses if SBP remains above 100 mmHg  - Continue home metoprolol 100 BID.  - Try to avoid diuretics and nitrates since might cause hypotension post inferior STEMI  - Echo today, will follow up results  - Lipid panel, A1c, TSH ordered for tomorrow morning  - OK to stop trending troponin  - Outpatient follow up with cardiology in 1 month      Patient seen and discussed with Dr. Schwartz, who agrees with above plan.    Barb Sood MD  Internal Medicine, PGY-2  [facility] Cardiology Consult Team      Interval History:  Patient had some bloody bowel movements overnight and this morning. No pain. Has been up to chair, no dizziness or lightheadedness. Denies any further chest pain, no shortness of breath. Just  feeling tired today after being awake all night. Troponin trending down since stent placed.    Physical Exam:  Temp:  [97.2  F (36.2  C)-99.3  F (37.4  C)] 99.3  F (37.4  C)  Pulse:  [50-92] 92  Resp:  [11-20] 18  BP: ()/() 138/83  MAP:  [94 mmHg-113 mmHg] 113 mmHg  Arterial Line BP: (131-157)/(70-82) 156/82  SpO2:  [88 %-100 %] 99 %    Wt:   Wt Readings from Last 5 Encounters:   04/07/21 43.3 kg (95 lb 7.4 oz)   04/01/21 42.6 kg (94 lb)   03/16/21 43.3 kg (95 lb 6.4 oz)   09/08/20 42.4 kg (93 lb 8 oz)   09/02/20 42 kg (92 lb 11.2 oz)     GEN: NAD  Pulm: Breathing non-labored  Cardiac: RRR, normal S1/S2, no murmurs appreciated  Vascular: No lower extremity edema  GI: Soft, non distended  Neuro: CN II-XII grossly intact    Medications:   acetaminophen  975 mg Oral Q8H    [START ON 4/8/2021] aspirin  81 mg Oral Daily    atorvastatin  80 mg Oral Daily    calcium carbonate 600 mg-vitamin D 400 units  1 tablet Oral BID    ceFAZolin  1 g Intravenous Q12H    docusate sodium  100 mg Oral BID    heparin ANTICOAGULANT  5,000 Units Subcutaneous Q8H    lisinopril  5 mg Oral Daily    metoprolol tartrate  100 mg Oral BID    mycophenolic acid  360 mg Oral BID    polyethylene glycol  17 g Oral Daily    predniSONE  5 mg Oral Daily    scopolamine  1 patch Transdermal Q72H    And    scopolamine   Transdermal Q8H    senna-docusate  1 tablet Oral BID    sodium chloride (PF)  3 mL Intracatheter Q8H    ticagrelor  90 mg Oral Q12H      Percutaneous Coronary Intervention orders placed (this is information for BPA alerting)      sodium chloride 100 mL/hr at 04/06/21 1456    sodium chloride 75 mL/hr at 04/07/21 0732       Labs:   CMP  Recent Labs   Lab 04/07/21  0446 04/06/21  2144 04/06/21  0726    141  --    POTASSIUM 4.3 4.2 3.5   CHLORIDE 109 112*  --    CO2 24 25  --    ANIONGAP 11 4  --    * 152*  --    BUN 20 18  --    CR 1.71* 1.61*  --    GFRESTIMATED 30* 32*  --    GFRESTBLACK 35* 38*  --    KAYKAY 8.2* 7.9*   --      CBC  Recent Labs   Lab 04/07/21  0446 04/06/21  2144 04/06/21  0726   WBC 10.0 9.5  --    RBC 3.27* 3.49*  --    HGB 9.3* 10.1* 14.1   HCT 29.6* 31.4*  --    MCV 91 90  --    MCH 28.4 28.9  --    MCHC 31.4* 32.2  --    RDW 14.1 14.1  --    * 132*  --        Diagnostics:    ECG:  ST elevations in II/III/aVF resolved after PCI    Echo: ordered, pending    Coronary angiogram 4/7:  Single vessel severe CAD involving the proximal RCA culprit in inferior STEMI.  Inferior STEMI  PCI with one drug eluting stent to the proximal RCA.

## 2021-04-07 NOTE — PROGRESS NOTES
"Rapid Response Team Note    Assessment   In assessment a rapid response was called on Maribel Yang due to hypotension. This presentation is likely due to ACS and worsened by Recent EVAR, right common iliac ampltaz plug, fem-fem bypass surgery for AAA and occluded R external iliac artery, now POD 0 days post operative.     Plan    -  500 ml bolus   -  BMP, CBC, troponin, lactic acid  -  Initial EKG with TWI in inferior leads with repeat EKG with ST elevations in inferior leads. Cards called and consulted STAT.  mg x1. IV morphine 2 mg x1. SL nitroglycerin.   -  Abdominal US to assess possible hematoma vs pseudoaneurysm   -  The Vascular surgery primary team was at bedside and able to be reached and they are in agreement with the above plan.  -  Disposition: The patient will remain on the current unit. We will continue to monitor this patient closely.  -  Reassessment and plan follow-up will be performed by the primary team     ADDENDUM: Troponin elevated at 5.364 and repeat EKG with progressing ST elevations in inferior leads. Cardiology to take to cath lab stat.     Roxy Galvin PA-C  [facility] RRT [facility] Job Code Contact #[phone]    Hospital Course   Brief Summary of events leading to rapid response:   RRT was called for hypotension and bradycardia when getting up to go to the bathroom. Patient states she felt a little \"whoozy\" and felt some chest tightness after the event. Patient was diaphoretic and having nausea and vomiting since surgery.  Denies any chest pain or pressure, SOB, or palpitations. Denies any pain radiating to her back, or abdominal pain.     Patient admitted for planned AAA repair on 4/6/2021. PMHx of kidney transplant, PAD, and HTN.     Admission Diagnosis:   Abdominal aortic aneurysm (AAA) without rupture (H) [I71.4]     Physical Exam   Temp: 97.4  F (36.3  C) Temp  Min: 97.4  F (36.3  C)  Max: 98.1  F (36.7  C)  Resp: (P) 16 Resp  Min: 12  Max: 20  SpO2: 100 % SpO2  Min: " 94 %  Max: 100 %  Pulse: (P) 50 Pulse  Min: 50  Max: 91    No data recorded  BP: (P) 123/71 Systolic (24hrs), Av , Min:73 , Max:171   Diastolic (24hrs), Av, Min:54, Max:104     I/Os: I/O last 3 completed shifts:  In: 2720 [P.O.:20; I.V.:2000]  Out: 500 [Urine:400; Blood:100]     Exam:   General: diaphoretic. chronically ill appearing  Mental Status: AAOx4.  CV: RRR.   Resp: non-labored breathing on 5L of O2 NC  Abd: Mild edema and firmness of RLQ which is tender to palpation without any peritoneal signs.   Extremities. No LE edema. Fingers cool, but brisk cap refill.     Significant Results and Procedures   Lactic Acid: No results for input(s): LACT, LACTS in the last 56710 hours.  CBC:   Recent Labs   Lab Test 21  0726 21  1508 20  1228 19  1044   WBC  --  5.8 6.0 5.6   HGB 14.1 13.0 13.7 13.5   HCT  --  40.4 43.5 42.2   PLT  --  287 228 237        Sepsis Evaluation   The patient is not known to have an infection.  NO EVIDENCE OF SEPSIS at this time.  Vital sign, physical exam, and lab findings are likely due to ACS.

## 2021-04-07 NOTE — PLAN OF CARE
"/85   Pulse 74   Temp 97.2  F (36.2  C) (Axillary)   Resp 16   Ht 1.575 m (5' 2\")   Wt 43.1 kg (95 lb 0.3 oz)   SpO2 98%   BMI 17.38 kg/m      Hours of Care: 17:00 - 23:30.  Reason for admission: Planned endovascular AAA repair with femofemoral bypass    Neuro: A/Ox4  Cardiac: SB with HR 50s-70s, occasionally high 40s. Provider notified of HR and long QT (0.5 per Tele). BP 140s-160s / 80s-90s until episode of hypotension at 21:30 while pt up to BSC, rapid response called - see provider notification. Bilateral dorsalis pedis and posterior tibial pulses found q1-2h  with doppelar. L radial art line site CDI with no changes and CMS intact.   Respiratory: Sating >96 on 2L NC. Capo in place / WNL  GI/: Voiding adequately via bedpan and BSC  Diet: Attempted clears and advance as tolerated, but pt having frequent episodes of emesis with PO intake. Per team, zofran and compazine contraindicated in setting of long QT, scopolomine patch administered with minimal relief.  Pain: Pt c/o abdominal tenderness. Scheduled Tylenol and PRN Oxy given x1  Activity: Per POC, as tolerated  LDAs: 3PIV: SL, NS@ 75 + TKO.  Skin/Wounds: Bilateral groin sites CDI. Provider paged at 18:30 to assess RLQ mass at bedside after possible changes noted, boundary marked and plan to monitor closely.     RRT called at 21:30 and pt transported to cath lab this evening, see provider notification.    Hgb 10.1 this evening, down from 14.1 this morning.     Plan: Trend Troponin q3, lactic acid q4.   Continue to monitor and follow POC  Jazmyn Hodge RN on 4/7/2021 at 12:33 AM   ?    "

## 2021-04-07 NOTE — PLAN OF CARE
Neuro: A&Ox4. Following commands. Somnolent for most of night. TR band removed at 0245 to R arm.     Cardiac: SR HR in  60 -70's. VSS. Afebrile    Respiratory: Sating >95% on 1L of O2.  GI/: Adequate urine output. BM X1. Small bloody stool noted, MD aware. Ordered to continue to monitor. Denies any nausea as of now, Scopolamine patch in place L ear.   Diet/appetite: Low saturated fat diet. Poor appetite.   Activity:  Assist of 1, up to chair and in halls.  Pain: Denies pain. PRN oxy available. At acceptable level on current regimen.   Skin: No new deficits noted. Mepi to coccyx. Left and right groin derma bonded, CDI.    LDA's: PIV x2. IVF at 75mL/hr.     Plan: Continue to watch for bloody stools and trend labs. Plan for echo this AM.  Continue with POC. Notify primary team with changes.

## 2021-04-07 NOTE — PROVIDER NOTIFICATION
Vascular surgery paged and RRT called at 21:30 for new diaphoresis and hypotension with BP 70s / 50s with HR 30s-40s when pt was up to commode. BP previously 140s-160s / 80s-90s with HR 50s-60s. Pt c/o chest tightness when placed back in bed. Vascular surgery and rapid team assessed at bedside, stat EKG completed and Cardiology consulted at beside to address concern for MI. Stat labs drawn at 21:44  and troponin resulted 5.364. Abd US and echo, repeat EKG completed at bedside. Pt received 500 ml bolus NS, 2 mg Morphine IV and 0.4 mg Nitroglycerine SL. Pt was transported to cath lab at 23:15 for coronary angiogram and possible PCI.

## 2021-04-07 NOTE — CONSULTS
Care Management Initial Consult    General Information  Assessment completed with: Patient,    Type of CM/SW Visit: Initial Assessment    Primary Care Provider verified and updated as needed:   Lisa Martinez [phone] [address] [address]      Readmission within the last 30 days: no previous admission in last 30 days         Advance Care Planning: Advance Care Planning Reviewed: no concerns identified. Pt does not want to complete HCD at this time.      Communication Assessment  Patient's communication style: spoken language (English or Bilingual)    Hearing Difficulty or Deaf: no   Wear Glasses or Blind: yes    Cognitive  Cognitive/Neuro/Behavioral: .WDL except  Level of Consciousness: lethargic  Arousal Level: arouses to voice  Orientation: oriented x 4  Mood/Behavior: calm, cooperative  Best Language: 0 - No aphasia  Speech: logical, clear    Living Environment:   People in home: spouse  (, Padilla)  Current living Arrangements: house. Pt primarily stays on the second level where her bedroom, bathroom and crafts room are. Flight of stairs w/railing on 1 side to access second floor.      Able to return to prior arrangements: yes     Family/Social Support:  Care provided by: self  Provides care for: no one  Marital Status:   , Children  (Padilla)  - part time PCA.     Description of Support System: Supportive    Support Assessment: Adequate family and caregiver support, Adequate social supports  4 adult children: Aminata ([place]), Francisco ([place]), Matos ([place]), Banegas ([place]).    Current Resources:   Patient receiving home care services: No     Community Resources: Meals on Wheels (every week; 7 dinners).  Equipment currently used at home: none  Supplies currently used at home: None    Employment/Financial:  Employment Status: retired        Financial Concerns: No concerns identified     Lifestyle & Psychosocial Needs:       "  Socioeconomic History     Marital status:      Spouse name: Padilla Yang     Number of children: 4     Years of education: 14-15     Highest education level: Not on file   Occupational History     Occupation:      Employer: NONE      Employer: [employer]     Tobacco Use     Smoking status: Current Some Day Smoker     Packs/day: 0.30     Years: 35.00     Pack years: 10.50     Types: Cigarettes     Start date: 1/1/1967     Smokeless tobacco: Never Used     Tobacco comment: Couple times a week. 1-2 cigs 1-2x a week.   Substance and Sexual Activity     Alcohol use: Not Currently     Alcohol/week: 0.0 standard drinks     Frequency: Monthly or less     Drinks per session: 1 or 2     Binge frequency: Less than monthly     Comment: rarely     Drug use: Not Currently     Types: Marijuana     Sexual activity: Not Currently     Partners: Male     Birth control/protection: Abstinence     Comment: 25 years of marriage       Mental Health Status:  Mental Health Status: No Current Concerns       Chemical Dependency Status:  Chemical Dependency Status: No Current Concerns. Denied drinking alcohol. Smokes \"once in awhile.\"        Values/Beliefs:  Spiritual, Cultural Beliefs, Yarsani Practices, Values that affect care: yes; Mormonism. Not interested in hospital  services.              Additional Information:  SW met with pt at bedside and introduced self and role of SW. Pt was pleasant and A&Ox4. Pt lives with her , Padilla, in a 2 story home in [place]. Pt primarily stays on the second level where her bedroom and bathroom are. Flight of stairs w/1sided railing to access. Padilla works part time as a PCA. They have 4 adult children: Aminata ([place]), Francisco ([place]), Matos ([place]), Banegas ([place]). SW explained discharge recommendations are pending at this time. If needed, pt states her daughter, Aminata, can stay with her at discharge. Pt prefers discharging home " vs TCU. Pt receives Meals on Wheels (every week, 7 dinners). Pt denied any mental or chemical health concerns. Pt states a family member can assist with transportation at discharge.     Medina JAMA, [facility]

## 2021-04-08 ENCOUNTER — APPOINTMENT (OUTPATIENT)
Dept: OCCUPATIONAL THERAPY | Facility: CLINIC | Age: 71
DRG: 268 | End: 2021-04-08
Attending: SURGERY
Payer: COMMERCIAL

## 2021-04-08 ENCOUNTER — APPOINTMENT (OUTPATIENT)
Dept: PHYSICAL THERAPY | Facility: CLINIC | Age: 71
DRG: 268 | End: 2021-04-08
Attending: SURGERY
Payer: COMMERCIAL

## 2021-04-08 LAB
ABO + RH BLD: NORMAL
ABO + RH BLD: NORMAL
ANION GAP SERPL CALCULATED.3IONS-SCNC: 8 MMOL/L (ref 3–14)
BASOPHILS # BLD AUTO: 0 10E9/L (ref 0–0.2)
BASOPHILS NFR BLD AUTO: 0.2 %
BLD GP AB SCN SERPL QL: NORMAL
BLD PROD TYP BPU: NORMAL
BLD PROD TYP BPU: NORMAL
BLD UNIT ID BPU: 0
BLOOD BANK CMNT PATIENT-IMP: NORMAL
BLOOD PRODUCT CODE: NORMAL
BPU ID: NORMAL
BUN SERPL-MCNC: 24 MG/DL (ref 7–30)
CALCIUM SERPL-MCNC: 8.4 MG/DL (ref 8.5–10.1)
CHLORIDE SERPL-SCNC: 110 MMOL/L (ref 94–109)
CHOLEST SERPL-MCNC: 93 MG/DL
CO2 SERPL-SCNC: 24 MMOL/L (ref 20–32)
CREAT SERPL-MCNC: 1.79 MG/DL (ref 0.52–1.04)
DIFFERENTIAL METHOD BLD: ABNORMAL
EOSINOPHIL # BLD AUTO: 0 10E9/L (ref 0–0.7)
EOSINOPHIL NFR BLD AUTO: 0.3 %
ERYTHROCYTE [DISTWIDTH] IN BLOOD BY AUTOMATED COUNT: 14.6 % (ref 10–15)
ERYTHROCYTE [DISTWIDTH] IN BLOOD BY AUTOMATED COUNT: 14.6 % (ref 10–15)
GFR SERPL CREATININE-BSD FRML MDRD: 29 ML/MIN/{1.73_M2}
GLUCOSE SERPL-MCNC: 117 MG/DL (ref 70–99)
HBA1C MFR BLD: 5.6 % (ref 0–5.6)
HCT VFR BLD AUTO: 23.1 % (ref 35–47)
HCT VFR BLD AUTO: 29.9 % (ref 35–47)
HDLC SERPL-MCNC: 37 MG/DL
HGB BLD-MCNC: 7.5 G/DL (ref 11.7–15.7)
HGB BLD-MCNC: 9.6 G/DL (ref 11.7–15.7)
IMM GRANULOCYTES # BLD: 0.1 10E9/L (ref 0–0.4)
IMM GRANULOCYTES NFR BLD: 0.6 %
LDLC SERPL CALC-MCNC: 23 MG/DL
LYMPHOCYTES # BLD AUTO: 0.4 10E9/L (ref 0.8–5.3)
LYMPHOCYTES NFR BLD AUTO: 3.7 %
MCH RBC QN AUTO: 28.6 PG (ref 26.5–33)
MCH RBC QN AUTO: 29.4 PG (ref 26.5–33)
MCHC RBC AUTO-ENTMCNC: 32.1 G/DL (ref 31.5–36.5)
MCHC RBC AUTO-ENTMCNC: 32.5 G/DL (ref 31.5–36.5)
MCV RBC AUTO: 89 FL (ref 78–100)
MCV RBC AUTO: 91 FL (ref 78–100)
MONOCYTES # BLD AUTO: 1.1 10E9/L (ref 0–1.3)
MONOCYTES NFR BLD AUTO: 8.9 %
NEUTROPHILS # BLD AUTO: 10.3 10E9/L (ref 1.6–8.3)
NEUTROPHILS NFR BLD AUTO: 86.3 %
NONHDLC SERPL-MCNC: 55 MG/DL
NRBC # BLD AUTO: 0 10*3/UL
NRBC BLD AUTO-RTO: 0 /100
NUM BPU REQUESTED: 1
PLATELET # BLD AUTO: 105 10E9/L (ref 150–450)
PLATELET # BLD AUTO: 113 10E9/L (ref 150–450)
POTASSIUM SERPL-SCNC: 3.6 MMOL/L (ref 3.4–5.3)
RBC # BLD AUTO: 2.55 10E12/L (ref 3.8–5.2)
RBC # BLD AUTO: 3.36 10E12/L (ref 3.8–5.2)
SODIUM SERPL-SCNC: 141 MMOL/L (ref 133–144)
SPECIMEN EXP DATE BLD: NORMAL
TRANSFUSION STATUS PATIENT QL: NORMAL
TRANSFUSION STATUS PATIENT QL: NORMAL
TRIGL SERPL-MCNC: 162 MG/DL
TSH SERPL DL<=0.005 MIU/L-ACNC: 1.97 MU/L (ref 0.4–4)
WBC # BLD AUTO: 11.9 10E9/L (ref 4–11)
WBC # BLD AUTO: 13 10E9/L (ref 4–11)

## 2021-04-08 PROCEDURE — 250N000011 HC RX IP 250 OP 636: Performed by: SURGERY

## 2021-04-08 PROCEDURE — P9016 RBC LEUKOCYTES REDUCED: HCPCS | Performed by: ANESTHESIOLOGY

## 2021-04-08 PROCEDURE — 250N000013 HC RX MED GY IP 250 OP 250 PS 637: Performed by: INTERNAL MEDICINE

## 2021-04-08 PROCEDURE — 120N000003 HC R&B IMCU UMMC

## 2021-04-08 PROCEDURE — 36415 COLL VENOUS BLD VENIPUNCTURE: CPT | Performed by: SURGERY

## 2021-04-08 PROCEDURE — 80048 BASIC METABOLIC PNL TOTAL CA: CPT | Performed by: SURGERY

## 2021-04-08 PROCEDURE — 258N000003 HC RX IP 258 OP 636: Performed by: SURGERY

## 2021-04-08 PROCEDURE — 85027 COMPLETE CBC AUTOMATED: CPT | Performed by: SURGERY

## 2021-04-08 PROCEDURE — 97110 THERAPEUTIC EXERCISES: CPT | Mod: GO

## 2021-04-08 PROCEDURE — 84443 ASSAY THYROID STIM HORMONE: CPT | Performed by: SURGERY

## 2021-04-08 PROCEDURE — 999N000128 HC STATISTIC PERIPHERAL IV START W/O US GUIDANCE

## 2021-04-08 PROCEDURE — 83036 HEMOGLOBIN GLYCOSYLATED A1C: CPT | Performed by: SURGERY

## 2021-04-08 PROCEDURE — 97535 SELF CARE MNGMENT TRAINING: CPT | Mod: GO

## 2021-04-08 PROCEDURE — 85025 COMPLETE CBC W/AUTO DIFF WBC: CPT | Performed by: SURGERY

## 2021-04-08 PROCEDURE — 250N000013 HC RX MED GY IP 250 OP 250 PS 637: Performed by: SURGERY

## 2021-04-08 PROCEDURE — 250N000012 HC RX MED GY IP 250 OP 636 PS 637: Performed by: SURGERY

## 2021-04-08 PROCEDURE — 80061 LIPID PANEL: CPT | Performed by: SURGERY

## 2021-04-08 PROCEDURE — 97162 PT EVAL MOD COMPLEX 30 MIN: CPT | Mod: GP

## 2021-04-08 PROCEDURE — 97116 GAIT TRAINING THERAPY: CPT | Mod: GP

## 2021-04-08 PROCEDURE — 97530 THERAPEUTIC ACTIVITIES: CPT | Mod: GP

## 2021-04-08 RX ORDER — LISINOPRIL 5 MG/1
5 TABLET ORAL DAILY
Status: DISCONTINUED | OUTPATIENT
Start: 2021-04-08 | End: 2021-04-10 | Stop reason: HOSPADM

## 2021-04-08 RX ADMIN — HEPARIN SODIUM 5000 UNITS: 5000 INJECTION, SOLUTION INTRAVENOUS; SUBCUTANEOUS at 16:49

## 2021-04-08 RX ADMIN — HEPARIN SODIUM 5000 UNITS: 5000 INJECTION, SOLUTION INTRAVENOUS; SUBCUTANEOUS at 08:13

## 2021-04-08 RX ADMIN — TICAGRELOR 90 MG: 90 TABLET ORAL at 08:14

## 2021-04-08 RX ADMIN — Medication 1 TABLET: at 08:14

## 2021-04-08 RX ADMIN — MYCOPHENOLIC ACID 360 MG: 360 TABLET, DELAYED RELEASE ORAL at 19:47

## 2021-04-08 RX ADMIN — METOPROLOL TARTRATE 100 MG: 50 TABLET, FILM COATED ORAL at 19:46

## 2021-04-08 RX ADMIN — ASPIRIN 81 MG CHEWABLE TABLET 81 MG: 81 TABLET CHEWABLE at 08:13

## 2021-04-08 RX ADMIN — Medication 1 TABLET: at 19:46

## 2021-04-08 RX ADMIN — MYCOPHENOLIC ACID 360 MG: 360 TABLET, DELAYED RELEASE ORAL at 08:13

## 2021-04-08 RX ADMIN — TICAGRELOR 90 MG: 90 TABLET ORAL at 19:46

## 2021-04-08 RX ADMIN — METOPROLOL TARTRATE 100 MG: 50 TABLET, FILM COATED ORAL at 08:13

## 2021-04-08 RX ADMIN — ATORVASTATIN CALCIUM 80 MG: 80 TABLET, FILM COATED ORAL at 08:13

## 2021-04-08 RX ADMIN — LISINOPRIL 5 MG: 5 TABLET ORAL at 10:38

## 2021-04-08 RX ADMIN — ACETAMINOPHEN 975 MG: 325 TABLET, FILM COATED ORAL at 00:55

## 2021-04-08 RX ADMIN — HEPARIN SODIUM 5000 UNITS: 5000 INJECTION, SOLUTION INTRAVENOUS; SUBCUTANEOUS at 23:52

## 2021-04-08 RX ADMIN — SODIUM CHLORIDE: 9 INJECTION, SOLUTION INTRAVENOUS at 19:47

## 2021-04-08 RX ADMIN — PREDNISONE 5 MG: 5 TABLET ORAL at 08:14

## 2021-04-08 RX ADMIN — HEPARIN SODIUM 5000 UNITS: 5000 INJECTION, SOLUTION INTRAVENOUS; SUBCUTANEOUS at 00:34

## 2021-04-08 RX ADMIN — ACETAMINOPHEN 975 MG: 325 TABLET, FILM COATED ORAL at 08:14

## 2021-04-08 ASSESSMENT — ACTIVITIES OF DAILY LIVING (ADL)
ADLS_ACUITY_SCORE: 15

## 2021-04-08 ASSESSMENT — MIFFLIN-ST. JEOR: SCORE: 930.25

## 2021-04-08 NOTE — PLAN OF CARE
Neuro: A&Ox4.   Cardiac: SR. VSS.   Respiratory: Sating adequately on RA.  GI/: Adequate urine output. BM X1  Diet/appetite: Tolerating NPO diet. Intermittent nausea, treated with aromatherapy.   Activity:  Assist of 1, up to commode throughout the night.  Pain: At acceptable level on current regimen.   Skin: No new deficits noted.  LDA's: PIVx1    Plan: Continue with POC. Notify primary team with changes. Pt slept well.

## 2021-04-08 NOTE — PROGRESS NOTES
CLINICAL NUTRITION SERVICES - BRIEF NOTE      Nutrition Prescription     RECOMMENDATIONS FOR MDs/PROVIDERS TO ORDER:  Please consult RD if immediate role in patient care or TD will continue to follow per protocol     Recommendations already ordered by Registered Dietitian (RD):  None     Future/Additional Recommendations:  Monitor tolerance of oral intake, need for scheduled snacks/supplements/nutrition education      New Findings:  Patient screened for unsure about weight loss     Weight history reviewed:   Wt Readings from Last 15 Encounters:   04/08/21 44.7 kg (98 lb 8.7 oz)   04/01/21 42.6 kg (94 lb)   03/16/21 43.3 kg (95 lb 6.4 oz)   09/08/20 42.4 kg (93 lb 8 oz)   09/02/20 42 kg (92 lb 11.2 oz)   05/19/20 42.2 kg (93 lb)   03/16/20 42.4 kg (93 lb 6.4 oz)   03/07/20 45.8 kg (101 lb)   02/21/20 42.2 kg (93 lb)   09/10/19 44 kg (96 lb 14.4 oz)   08/19/19 43.6 kg (96 lb 3.2 oz)   08/07/19 44 kg (97 lb)   06/12/19 43.8 kg (96 lb 8 oz)   05/06/19 44.2 kg (97 lb 6.4 oz)   04/26/19 45 kg (99 lb 3.2 oz)     Interventions  None at this time     RD to follow per protocol.    Yolis Mason RD, LD  6B pager: [phone]

## 2021-04-08 NOTE — PROGRESS NOTES
"   04/08/21 0853   Quick Adds   Type of Visit Initial PT Evaluation   Living Environment   People in home spouse   Current Living Arrangements house   Home Accessibility stairs to enter home;stairs within home   Number of Stairs, Main Entrance 3   Stair Railings, Main Entrance none   Number of Stairs, Within Home, Primary other (see comments)  (13)   Stair Railings, Within Home, Primary   (1 HR)   Transportation Anticipated family or friend will provide   Living Environment Comments Pt has tub/shower. Pt reports she mainly stays on second floor of home - where bedroom and bathroom are located.   Self-Care   Usual Activity Tolerance moderate   Current Activity Tolerance fair   Regular Exercise No   Equipment Currently Used at Home none   Activity/Exercise/Self-Care Comment Pt reports IND with mobility/ADLs. Pt does not use AD for mobility at baseline.   Disability/Function   Fall history within last six months no   Change in Functional Status Since Onset of Current Illness/Injury yes   General Information   Onset of Illness/Injury or Date of Surgery 04/06/21   Referring Physician Devendra Salgado MD    Patient/Family Therapy Goals Statement (PT) Pt would like to return home.   Pertinent History of Current Problem (include personal factors and/or comorbidities that impact the POC) Per chart \"70 year old year old female with PMH of kidney transplant in 2014, lung cancer in 2014 s/p SBRT, anal canal carcinoma s/p chemoradiation in 6/2018, infrarenal AAA, thoracic aneurysm, hypertension, DVT on eliquis presented for EVAR on 4/6/2021. Cardiology urgently consulted for STEMI on 4/6, now s/p coronary angiogram with stent to proximal RCA.\"   Existing Precautions/Restrictions fall   General Observations Activity - up ad lazaro.   Cognition   Orientation Status (Cognition) oriented x 4   Affect/Mental Status (Cognition) WNL   Follows Commands (Cognition) WNL   Pain Assessment   Patient Currently in Pain Yes, see Vital Sign " flowsheet  (L anterior thigh pain.)   Posture    Posture Forward head position;Protracted shoulders;Kyphosis   Range of Motion (ROM)   ROM Comment B LE ROM WFL.   Strength   Strength Comments B LE strength at least >3/5. Gross functional weakness/deconditioning present.   Bed Mobility   Comment (Bed Mobility) Pt completes supine > sit with HOB slightly elevated and CGA.   Transfers   Transfer Safety Comments Pt transfers sit <> stand with CGA, increased reliance on UEs.   Gait/Stairs (Locomotion)   Comment (Gait/Stairs) Pt ambulates with FWW and CGA + WC follow. Pt with forward flexed posture and downward gaze. Pt with decreased step length / height B.   Balance   Balance Comments Pt requires FWW and CGA for static standing balance and ambulation.   Clinical Impression   Criteria for Skilled Therapeutic Intervention yes, treatment indicated   PT Diagnosis (PT) Impaired functional mobility   Influenced by the following impairments Weakness, impaired balance, decreased activity tolerance   Functional limitations due to impairments Bed mobility, transfers, gait, stairs   Clinical Presentation Evolving/Changing   Clinical Presentation Rationale Clinical judgement   Clinical Decision Making (Complexity) moderate complexity   Therapy Frequency (PT) 6x/week   Predicted Duration of Therapy Intervention (days/wks) 1 week   Planned Therapy Interventions (PT) balance training;bed mobility training;gait training;home exercise program;patient/family education;postural re-education;stair training;strengthening;transfer training   Anticipated Equipment Needs at Discharge (PT) walker, rolling   Risk & Benefits of therapy have been explained evaluation/treatment results reviewed;care plan/treatment goals reviewed;risks/benefits reviewed;patient   PT Discharge Planning    PT Discharge Recommendation (DC Rec) Transitional Care Facility   PT Rationale for DC Rec Recommend TCU at this time as pt below functional baseline and currently  requiring assist for mobility. Pending LOS, progress in therapies (must navigate 13 stairs 1 HR), and support available at home pt may be able to discharge home with assist and  PT. Will continue to monitor and update recs as indicated.   Total Evaluation Time   Total Evaluation Time (Minutes) 8

## 2021-04-08 NOTE — PROGRESS NOTES
Brief Cardiology Progress Note    70 year old year old female with PMH of kidney transplant in 2014, lung cancer in 2014 s/p SBRT, anal canal carcinoma s/p chemoradiation in 6/2018, infrarenal AAA, thoracic aneurysm, hypertension, DVT on eliquis presented for EVAR on 4/6/2021. Cardiology urgently consulted for STEMI on 4/6, now s/p coronary angiogram with stent to proximal RCA.     TTE on 4/7 showed LVEF 40%. Inferior and apical wall akinesis is present.  TSH and A1c wnl.    No acute events overnight. No further CP or SOB. EKG reviewed with TWI inferiorly but otherwise stable, no STEs.    Plan:  # Inferior STEMI s/p LIUDMILA to pRCA  # HFrEF/ICM, EF 40%  - Antiplatelet: She is currently on aspirin and Brilinta.  She has been on Eliquis for DVT.  If the primary team is going to resume Eliquis this can be done safely with concomitant ticagrelor.  We recommend to stop aspirin if the primary team is going to resume Eliquis.  - Statin: Continue atorvastatin to 80 mg daily   - BB: Continue home metoprolol 100 BID.  - ACEI: Continue lisinopril 5 daily. Can increase to 10 mg daily after 3 doses if SBP remains above 100 mmHg  - Discontinue PTA nifedipine  - Outpatient follow up with cardiology in 1 month    Nikki Pan MD  Cardiology Fellow

## 2021-04-08 NOTE — PLAN OF CARE
Neuro: A&Ox4.   Cardiac: SR with HR In 50s-90s.  BP 100s-130s/50s-70s. Denies numbness/tingling in hands and feet and CMS unchanged.  Tmax 99.8.  Respiratory: Sating >92% on 2L NC.  GI/: Adequate urine output. Multiple loose stools.  Diet/appetite: Tolerating clear liquid diet. Denies nausea.  Activity:  Ambulating with A1 and GB.  Up in chair x3.    Pain: Denies pain; refused evening tylenol.  Skin: No new deficits noted.  LDA's: Right PIV SL and left PIV infusing NS @ 30ml/hr.    Plan: Continue with POC. Notify primary team with changes.

## 2021-04-08 NOTE — PROGRESS NOTES
"VASCULAR SURGERY PROGRESS NOTE    Subjective:  In good spirits. Resting comfortably in bed.     Objective:    Intake/Output Summary (Last 24 hours) at 4/7/2021 1051  Last data filed at 4/7/2021 0900  Gross per 24 hour   Intake 3906.25 ml   Output 1250 ml   Net 2656.25 ml   UOP: 600 ml; 1x       PHYSICAL EXAM:  /64 (BP Location: Left arm)   Pulse 69   Temp 99.8  F (37.7  C) (Oral)   Resp 16   Ht 1.575 m (5' 2\")   Wt 44.7 kg (98 lb 8.7 oz)   SpO2 93%   BMI 18.02 kg/m    General: The patient is alert and oriented. Appropriate. No acute distress  Psych: pleasant affect, answers questions appropriately  Skin: Color appropriate for race, warm, dry.  Respiratory: The patient does not require supplemental oxygen. Breathing unlabored  GI:  Abdomen soft, nontender to light palpation.  Extremities: Groin incisions are clean/dry/intact. She has a multiphasic signal over the bypass graft. She has monophasic R DP signal.     Imaging:   Reviewed    ASSESSMENT:  69 yo lady hx of left kidney DDKT, lung cancer and anal cancer in remission now s/p EVAR with AUI, Amplatzer plug of right common iliac artery, and left to right femoral to femoral bypass using 7 mm Artegraft. Post-op course complicated by STEMI requiring cardiac catheterization and RCA revascularization with drug eluting stent on POD 0.      Patient does reports some bloody stool output - on exam, there is no jessa hematochezia or melena. Some flecks of red in the commode and on the stool. Patient with slight AMY - baseline Creatinine is around 1.6.     PLAN:  - LLE duplex reviewed    - Continue aspirin, brilinta (x12 months total); atorvastatin 80 mg every day;Troponins downtrending   - CLD; IVF@30 ml/hr  - AMY; Cr: 1.79 (1.74)   - Hemoglobin: 7.5 (9.0); giving 1U pRBC; recheck CBC this afternoon   - Physical therapy ordered  - Continue care in 6B    Discussed pt history, exam, assessment and plan with Dr. Ferrara of the vascular surgery service, who is in " agreement with the above.    Devendra Salgado MD  Division of Vascular Surgery   Pager: [phone]

## 2021-04-09 ENCOUNTER — APPOINTMENT (OUTPATIENT)
Dept: PHYSICAL THERAPY | Facility: CLINIC | Age: 71
DRG: 268 | End: 2021-04-09
Attending: SURGERY
Payer: COMMERCIAL

## 2021-04-09 ENCOUNTER — APPOINTMENT (OUTPATIENT)
Dept: OCCUPATIONAL THERAPY | Facility: CLINIC | Age: 71
DRG: 268 | End: 2021-04-09
Attending: SURGERY
Payer: COMMERCIAL

## 2021-04-09 LAB
ANION GAP SERPL CALCULATED.3IONS-SCNC: 7 MMOL/L (ref 3–14)
BASOPHILS # BLD AUTO: 0 10E9/L (ref 0–0.2)
BASOPHILS NFR BLD AUTO: 0.1 %
BUN SERPL-MCNC: 27 MG/DL (ref 7–30)
CALCIUM SERPL-MCNC: 8.7 MG/DL (ref 8.5–10.1)
CHLORIDE SERPL-SCNC: 109 MMOL/L (ref 94–109)
CO2 SERPL-SCNC: 23 MMOL/L (ref 20–32)
CREAT SERPL-MCNC: 1.87 MG/DL (ref 0.52–1.04)
DIFFERENTIAL METHOD BLD: ABNORMAL
EOSINOPHIL # BLD AUTO: 0.1 10E9/L (ref 0–0.7)
EOSINOPHIL NFR BLD AUTO: 0.7 %
ERYTHROCYTE [DISTWIDTH] IN BLOOD BY AUTOMATED COUNT: 15 % (ref 10–15)
GFR SERPL CREATININE-BSD FRML MDRD: 27 ML/MIN/{1.73_M2}
GLUCOSE SERPL-MCNC: 97 MG/DL (ref 70–99)
HCT VFR BLD AUTO: 27.5 % (ref 35–47)
HGB BLD-MCNC: 9 G/DL (ref 11.7–15.7)
IMM GRANULOCYTES # BLD: 0.1 10E9/L (ref 0–0.4)
IMM GRANULOCYTES NFR BLD: 0.9 %
LYMPHOCYTES # BLD AUTO: 0.4 10E9/L (ref 0.8–5.3)
LYMPHOCYTES NFR BLD AUTO: 3.6 %
MCH RBC QN AUTO: 30 PG (ref 26.5–33)
MCHC RBC AUTO-ENTMCNC: 32.7 G/DL (ref 31.5–36.5)
MCV RBC AUTO: 92 FL (ref 78–100)
MONOCYTES # BLD AUTO: 0.9 10E9/L (ref 0–1.3)
MONOCYTES NFR BLD AUTO: 8.9 %
NEUTROPHILS # BLD AUTO: 8.8 10E9/L (ref 1.6–8.3)
NEUTROPHILS NFR BLD AUTO: 85.8 %
NRBC # BLD AUTO: 0 10*3/UL
NRBC BLD AUTO-RTO: 0 /100
PLATELET # BLD AUTO: 97 10E9/L (ref 150–450)
POTASSIUM SERPL-SCNC: 3.4 MMOL/L (ref 3.4–5.3)
RBC # BLD AUTO: 3 10E12/L (ref 3.8–5.2)
SODIUM SERPL-SCNC: 139 MMOL/L (ref 133–144)
WBC # BLD AUTO: 10.3 10E9/L (ref 4–11)

## 2021-04-09 PROCEDURE — 120N000003 HC R&B IMCU UMMC

## 2021-04-09 PROCEDURE — 250N000013 HC RX MED GY IP 250 OP 250 PS 637: Performed by: NURSE PRACTITIONER

## 2021-04-09 PROCEDURE — 97530 THERAPEUTIC ACTIVITIES: CPT | Mod: GO

## 2021-04-09 PROCEDURE — 250N000013 HC RX MED GY IP 250 OP 250 PS 637: Performed by: SURGERY

## 2021-04-09 PROCEDURE — 250N000011 HC RX IP 250 OP 636: Performed by: INTERNAL MEDICINE

## 2021-04-09 PROCEDURE — 250N000009 HC RX 250: Performed by: STUDENT IN AN ORGANIZED HEALTH CARE EDUCATION/TRAINING PROGRAM

## 2021-04-09 PROCEDURE — 36415 COLL VENOUS BLD VENIPUNCTURE: CPT | Performed by: SURGERY

## 2021-04-09 PROCEDURE — 99024 POSTOP FOLLOW-UP VISIT: CPT | Performed by: NURSE PRACTITIONER

## 2021-04-09 PROCEDURE — 250N000013 HC RX MED GY IP 250 OP 250 PS 637: Performed by: INTERNAL MEDICINE

## 2021-04-09 PROCEDURE — 97116 GAIT TRAINING THERAPY: CPT | Mod: GP

## 2021-04-09 PROCEDURE — 97530 THERAPEUTIC ACTIVITIES: CPT | Mod: GP

## 2021-04-09 PROCEDURE — 85025 COMPLETE CBC W/AUTO DIFF WBC: CPT | Performed by: SURGERY

## 2021-04-09 PROCEDURE — 250N000012 HC RX MED GY IP 250 OP 636 PS 637: Performed by: SURGERY

## 2021-04-09 PROCEDURE — 80048 BASIC METABOLIC PNL TOTAL CA: CPT | Performed by: SURGERY

## 2021-04-09 PROCEDURE — 250N000011 HC RX IP 250 OP 636: Performed by: SURGERY

## 2021-04-09 RX ORDER — ATORVASTATIN CALCIUM 80 MG/1
80 TABLET, FILM COATED ORAL DAILY
Qty: 60 TABLET | Refills: 4 | Status: ON HOLD | OUTPATIENT
Start: 2021-04-09 | End: 2021-05-22

## 2021-04-09 RX ORDER — ASPIRIN 81 MG/1
81 TABLET, CHEWABLE ORAL DAILY
Status: ON HOLD | COMMUNITY
Start: 2021-04-09 | End: 2021-05-22

## 2021-04-09 RX ORDER — FUROSEMIDE 20 MG
20 TABLET ORAL DAILY
Status: DISCONTINUED | OUTPATIENT
Start: 2021-04-09 | End: 2021-04-09

## 2021-04-09 RX ORDER — LOPERAMIDE HCL 2 MG
2 CAPSULE ORAL ONCE
Status: COMPLETED | OUTPATIENT
Start: 2021-04-09 | End: 2021-04-09

## 2021-04-09 RX ORDER — OXYCODONE HYDROCHLORIDE 5 MG/1
5 TABLET ORAL EVERY 6 HOURS PRN
Qty: 12 TABLET | Refills: 0 | Status: ON HOLD | OUTPATIENT
Start: 2021-04-09 | End: 2021-05-22

## 2021-04-09 RX ORDER — FUROSEMIDE 20 MG
20 TABLET ORAL ONCE
Status: COMPLETED | OUTPATIENT
Start: 2021-04-09 | End: 2021-04-09

## 2021-04-09 RX ORDER — LISINOPRIL 5 MG/1
5 TABLET ORAL DAILY
Qty: 60 TABLET | Refills: 4 | Status: ON HOLD | OUTPATIENT
Start: 2021-04-09 | End: 2021-05-22

## 2021-04-09 RX ORDER — ACETAMINOPHEN 325 MG/1
650 TABLET ORAL EVERY 4 HOURS PRN
Status: ON HOLD | COMMUNITY
Start: 2021-04-09 | End: 2021-05-22

## 2021-04-09 RX ADMIN — FUROSEMIDE 20 MG: 20 TABLET ORAL at 18:00

## 2021-04-09 RX ADMIN — MYCOPHENOLIC ACID 360 MG: 360 TABLET, DELAYED RELEASE ORAL at 20:52

## 2021-04-09 RX ADMIN — LISINOPRIL 5 MG: 5 TABLET ORAL at 08:00

## 2021-04-09 RX ADMIN — FUROSEMIDE 20 MG: 20 TABLET ORAL at 15:09

## 2021-04-09 RX ADMIN — TICAGRELOR 90 MG: 90 TABLET ORAL at 08:00

## 2021-04-09 RX ADMIN — Medication 1 TABLET: at 08:00

## 2021-04-09 RX ADMIN — HEPARIN SODIUM 5000 UNITS: 5000 INJECTION, SOLUTION INTRAVENOUS; SUBCUTANEOUS at 17:06

## 2021-04-09 RX ADMIN — ASPIRIN 81 MG CHEWABLE TABLET 81 MG: 81 TABLET CHEWABLE at 08:00

## 2021-04-09 RX ADMIN — TICAGRELOR 90 MG: 90 TABLET ORAL at 20:51

## 2021-04-09 RX ADMIN — MYCOPHENOLIC ACID 360 MG: 360 TABLET, DELAYED RELEASE ORAL at 08:00

## 2021-04-09 RX ADMIN — Medication 1 TABLET: at 20:51

## 2021-04-09 RX ADMIN — METOPROLOL TARTRATE 100 MG: 50 TABLET, FILM COATED ORAL at 08:00

## 2021-04-09 RX ADMIN — LOPERAMIDE HYDROCHLORIDE 2 MG: 2 CAPSULE ORAL at 09:06

## 2021-04-09 RX ADMIN — METOPROLOL TARTRATE 100 MG: 50 TABLET, FILM COATED ORAL at 20:00

## 2021-04-09 RX ADMIN — HEPARIN SODIUM 5000 UNITS: 5000 INJECTION, SOLUTION INTRAVENOUS; SUBCUTANEOUS at 08:00

## 2021-04-09 RX ADMIN — PREDNISONE 5 MG: 5 TABLET ORAL at 08:00

## 2021-04-09 RX ADMIN — ONDANSETRON 4 MG: 2 INJECTION INTRAMUSCULAR; INTRAVENOUS at 13:32

## 2021-04-09 RX ADMIN — ATORVASTATIN CALCIUM 80 MG: 80 TABLET, FILM COATED ORAL at 08:00

## 2021-04-09 RX ADMIN — SCOPALAMINE 1 PATCH: 1 PATCH, EXTENDED RELEASE TRANSDERMAL at 20:30

## 2021-04-09 ASSESSMENT — ACTIVITIES OF DAILY LIVING (ADL)
ADLS_ACUITY_SCORE: 15

## 2021-04-09 ASSESSMENT — MIFFLIN-ST. JEOR: SCORE: 929.25

## 2021-04-09 NOTE — PLAN OF CARE
"/80 (BP Location: Left arm)   Pulse 84   Temp 99.3  F (37.4  C) (Oral)   Resp 16   Ht 1.575 m (5' 2\")   Wt 44.6 kg (98 lb 5.2 oz)   SpO2 90%   BMI 17.98 kg/m      Neuro: A&Ox4.   Cardiac: SR. VSS.   Respiratory: Sating >92% on RA.  GI/: Adequate urine output. Multiple loose BM's today. Imodium given x1.   Diet/appetite: Tolerating Regular diet. Fair appetite.   Activity:  Assist of 1, up to chair and in halls.  Pain: At acceptable level on current regimen.   Skin: No new deficits noted.  LDA's: PIV    Plan: Plan for possible discharge tomorrow. Continue with POC. Notify primary team with changes.    "

## 2021-04-09 NOTE — PROGRESS NOTES
"VASCULAR SURGERY PROGRESS NOTE    Subjective:  Patient found sleeping in bed, appears comfortable.  Reports she has not had much pain at all.  Unfortunately, her roommate was under suspicion for COVID and Ms. Hilton was placed on droplet precautions and isolated.  The roommate's test came back negative this morning.     She is drinking fluids without nausea or vomiting and feeling hungry.  Continues to have diarrhea which is chronic for her.  Her CR is 1.87 this morning, Hgb 9 after transfusion yesterday.  Still feeling weak in her legs, but overall feels like she is improving.  Objective:    Intake/Output Summary (Last 24 hours) at 4/9/2021 0742  Last data filed at 4/8/2021 1947  Gross per 24 hour   Intake 2306.25 ml   Output 450 ml   Net 1856.25 ml     Labs:  ROUTINE IP LABS (Last four results)  BMP  Recent Labs   Lab 04/09/21  0520 04/08/21  0506 04/07/21  1629 04/07/21  0446    141 140 143   POTASSIUM 3.4 3.6 4.0 4.3   CHLORIDE 109 110* 108 109   KAYKAY 8.7 8.4* 8.4* 8.2*   CO2 23 24 25 24   BUN 27 24 22 20   CR 1.87* 1.79* 1.74* 1.71*   GLC 97 117* 133* 135*     CBC  Recent Labs   Lab 04/09/21  0520 04/08/21  1624 04/08/21  0506 04/07/21  1629   WBC 10.3 13.0* 11.9* 10.9   RBC 3.00* 3.36* 2.55* 3.09*   HGB 9.0* 9.6* 7.5* 9.0*   HCT 27.5* 29.9* 23.1* 27.4*   MCV 92 89 91 89   MCH 30.0 28.6 29.4 29.1   MCHC 32.7 32.1 32.5 32.8   RDW 15.0 14.6 14.6 14.4   PLT 97* 113* 105* 117*     INRNo lab results found in last 7 days.  PHYSICAL EXAM:  BP (!) 144/85 (BP Location: Left arm)   Pulse 84   Temp 99.4  F (37.4  C) (Oral)   Resp 16   Ht 1.575 m (5' 2\")   Wt 44.6 kg (98 lb 5.2 oz)   SpO2 96%   BMI 17.98 kg/m    General: The patient is alert and oriented. Appropriate. No acute distress  Psych: pleasant affect, answers questions appropriately  Skin: Color appropriate for race, warm, dry.  Respiratory: The patient does not require supplemental oxygen. Breathing unlabored  GI:  Abdomen soft, nontender to light " palpation.  Extremities: Bilateral groin incisions CDI with dermabond.  She has a multiphasic signal over the bypass graft. She has monophasic R DP signal.                 ASSESSMENT:  71 yo lady hx of left kidney DDKT, lung cancer and anal cancer in remission now s/p EVAR with AUI, Amplatzer plug of right common iliac artery, and left to right femoral to femoral bypass using 7 mm Artegraft. Post-op course complicated by STEMI requiring cardiac catheterization and RCA revascularization with drug eluting stent on POD 0.       Patient had some bloody stool output on POD 1 that was not concerning for bowel ischemia.  She had only some flecks of blood present in the commode and this has essentially resolved.  She continues to have diarrhea which is chronic for her and she takes scheduled imodium at home.  Her Cr is slightly elevated beyond her baseline this morning likely from the contrast load and decreased fluid intake.    PT recommending TCU, however, the patient lives at home with her  who serves as her PCA.  She spends most of her time on one level of the house, and given her immunocompromised condition, we feel she would be safer to go home with in-home PT      PLAN:  -Will plan for one dose of imodium today  -Continue ASA and Brilinta  -Advance diet, promote oral hydration  -OOB and up to chair today  -Will check BMP again tomorrow, not need for other labs  -Continue PT  -Discharge home hopefully tomorrow if tolerating diet.  Will plan for home PT and recheck BMP early next week.    Jessica Bunn, DNP, APRN, AGNP-C  Division of Vascular Surgery   [facility]  Pager: [phone]

## 2021-04-09 NOTE — PROGRESS NOTES
[facility] Vascular Surgery Service Discharge Summary:     NAME: Maribel Yang   MRN: [id number]   : 1950   PCP: Lisa Martinez    DATE OF ADMISSION: 2021       Procedure/Surgery Information   Procedure: Procedure(s):  CV CORONARY ANGIOGRAM  Percutaneous Coronary Intervention Stent Drug Eluting   Surgeon(s): Surgeon(s) and Role:     * Sincere Rosas MD - Primary   Specimens: * No specimens in log *         PRE/POSTOPERATIVE DIAGNOSES:    5.9 cm  AAA    PROCEDURES PERFORMED, 2021:   1.  Endovascular abdominal aortic aneurysm repair with an aorto uniiliac 32 mm Med[employer]onic device 2.  Left ipsilateral limb placement 16 mm x 93 mm, 3.  32 mm x 49 mm proximal aortic extension cuff 4.  16 mm x 16 mm x 82 mm left iliac extension, 5.  7 mm and 16 mm Amplatz plugs in the right common iliac artery, 6.  Femoral-femoral bypass with 7 mm Artegraft 7.  Multiple aortograms    INTRAOPERATIVE FINDINGS: Abdominal Aortic Aneurysm    POSTOPERATIVE COMPLICATIONS: None    DATE OF DISCHARGE: 2021    ADMISSION HPI (from 2021): This 70 year old female with a left pelvic fossa transplanted kidney was found to have a 5.9 cm AAA and an occluded right external iliac artery.  There was antegrade arterial flow into the right common iliac artery which emptied into the internal iliac artery and reconstituted the profunda femoris artery with a right femoral artery occlusion. I recommended endovascular AAA repair via a aorto uniiliac endovascular repair and a femorofemoral bypass along with plugging of the right common iliac artery in order to prevent endoleak.. She understood the risks and benefits and wished to proceed.    HOSPITAL COURSE: Maribel Yagn is a 70 year old female who was admitted on 2021 and underwent the above-named procedures.  She tolerated the procedure well and postoperatively was transferred to the general post-surgical unit. Unfortunately, that evening she developed chest  discomfort and was found to have and inferior STEMI.  She was taken emergently to the cath lab for PCI with LIUDMILA of the proximal RCA and initiated on DAPT.   On POD 1, nursing reported blood in her stool with 2 bowel movements, but the patient was asymptomatic.  Upon evaluation, there were mild flecks of blood in the commode thought to be more consistent with her chronic diarrhea.  The patient had no nausea, vomiting, or abdominal pain post-operatively.  Her Cr was mildly elevated above her baseline of 1.7 and this was thought to be due to the contrast load from her procedure and decreased oral intake with diarrhea.  Fluids were promoted and she is scheduled to have a recheck Cr within a week.  PT had been recommending TCU given her deconditioning, however, the patient has a strong support system at home.  Her  is healthy and serves as her PCA.  She reports she mostly stays on one level of the house and can adapt in the home to avoid stairs.  Given her immunocompromised condition, our team felt it would be safer for the patient to return home with PT and she is in agreement with this plan.  She is scheduled to follow up with outpatient cardiac rehab and has follow up arranged with cardiology. Cardiology recommended being discharged with lisinopril and stopping the nifedipine. The remainder of her course was essentiallly uncomplicated.  Prior to discharge, her pain was controlled well with tylenol and oxycodone.  She was able to perform ADLs and ambulate independently without difficulty, and had full return of bowel and bladder function.  On 4/10//2021, she was discharged home in stable condition.    Vascular Surgery Core Measures:   Atorvastatin increased to 80mg, Aspirin and Brillinta.  Will hold DOAC until Brillinta discontinued.    Physical Exam:  General: The patient is alert and oriented. Appropriate. No acute distress  Psych: pleasant affect, answers questions appropriately  Skin: Color appropriate for  race, warm, dry.  Respiratory: The patient does not require supplemental oxygen. Breathing unlabored  GI:  Abdomen soft, nontender to light palpation.  Extremities: Bilateral groin incisions CDI with dermabond. Some bruising in bilateral groins.  She has a multiphasic signal over the bypass graft. She has monophasic R DP signal and R PT signal. 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      Age-related osteoporosis without current pathological fracture 6/22/2019     Anemia      Antiplatelet or antithrombotic long-term use      ASCUS with positive high risk HPV 2007, 2015    + HPV 56, 54,& 6, colp - TAL III, Leep =TAL II     Basal cell carcinoma      Depressive disorder July 2015     Hypertension      Immunosuppressed status (H)     due meds     Kidney replaced by transplant 9/04    Living donor recipient,  Rejection 7/2005     LSIL (low grade squamous intraepithelial lesion) on Pap smear 4/2013    +HPV 33 or 45, 61       PAD (peripheral artery disease) (H)      PONV (postoperative nausea and vomiting)      Squamous cell lung cancer (H)      Thrombosis of leg 1967     Unspecified disorder of kidney and ureter     X-linked dominant Alport's syndrome.       PAST SURGICAL HISTORY:  Past Surgical History:   Procedure Laterality Date     BIOPSY      Kidney, Lung, Breast     BIOPSY ANAL N/A 3/14/2018    Procedure: BIOPSY ANAL;;  Surgeon: Shabbir Leo MD;  Location: [facility] TRANSPLANTATION OF KIDNEY  9/04    recipient -- done at [facility]     COLONOSCOPY       COLONOSCOPY N/A 8/9/2017    Procedure: COMBINED COLONOSCOPY, SINGLE OR MULTIPLE BIOPSY/POLYPECTOMY BY BIOPSY;;  Surgeon: Sushil Hyatt MD;  Location:  GI     COLPOSCOPY,LOOP ELECTRD CERVIX EXCIS  03/11/08    TAL II     CONIZATION LEEP  7/17/2013    Procedure: CONIZATION LEEP;;  Surgeon: Liliana Renteria MD;  Location:  OR      CONIZATION LEEP N/A 8/17/2016    Procedure: CONIZATION LEEP;  Surgeon: Liliana Renteria MD;  Location: [facility] OR     CV CORONARY ANGIOGRAM N/A 4/6/2021    Procedure: CV CORONARY ANGIOGRAM;  Surgeon: Sincere Rosas MD;  Location:  HEART CARDIAC CATH LAB     CV PCI STENT DRUG ELUTING N/A 4/6/2021    Procedure: Percutaneous Coronary Intervention Stent Drug Eluting;  Surgeon: Sincere Rosas MD;  Location:  HEART CARDIAC CATH LAB     EXAM UNDER ANESTHESIA ANUS  7/15/2014    Procedure: EXAM UNDER ANESTHESIA ANUS;  Surgeon: Radha Musa MD;  Location: [facility] OR     EXAM UNDER ANESTHESIA ANUS N/A 3/14/2018    Procedure: EXAM UNDER ANESTHESIA ANUS;  Anal Exam Under Anesthesia With Excision of anal lesion, proctoscopy;  Surgeon: Shabbir Leo MD;  Location: [facility] OR     EYE SURGERY       GENITOURINARY SURGERY       IR OR ANGIOGRAM  4/6/2021     LASER CO2 EXCISE VULVA WIDE LOCAL  7/15/2014    Procedure: LASER CO2 EXCISE VULVA WIDE LOCAL;  Surgeon: Liliana Renteria MD;  Location: [facility] OR     LASER CO2 VAGINA  7/17/2013    Procedure: LASER CO2 VAGINA;;  Surgeon: Liliana Renteria MD;  Location: [facility] OR     LASER CO2 VAGINA N/A 9/25/2018    Procedure: LASER CO2 VAGINA;  Exam Under Anesthesia, CO2 Laser Ablation of Upper Vagina and Cervix;  Surgeon: Pati Garcia MD;  Location: [facility] OR     MICROSCOPY ANAL  7/17/2013    Procedure: MICROSCOPY ANAL;  Anal Microscopy,  EUA vagina,Colposcopy Of Vagina And Vulva, Vaginal Biopsies, Omniguide Co2 Laser To Vagina and vulva, Loop Electrosurgical Excision Procedure To Cervix;  Surgeon: Radha Musa MD;  Location: [facility] OR     MICROSCOPY ANAL  7/15/2014    Procedure: MICROSCOPY ANAL;  Surgeon: Radha Musa MD;  Location: [facility] OR     VASCULAR SURGERY      Thrombectomy     ZZC NONSPECIFIC PROCEDURE      Thrombectomy     ZZC NONSPECIFIC PROCEDURE  1955 and 1959    Bilater eye surgery - correction for crossed eyes     [facility]  NONSPECIFIC PROCEDURE  1998    oopherectomy L     ZZC NONSPECIFIC PROCEDURE  1967    open kidney biopsy - L         Labs:  Recent Labs   Lab Test 04/09/21  0520 04/08/21  1624   WBC 10.3 13.0*   RBC 3.00* 3.36*   HGB 9.0* 9.6*   HCT 27.5* 29.9*   MCV 92 89   MCH 30.0 28.6   MCHC 32.7 32.1   PLT 97* 113*     Recent Labs   Lab Test 04/09/21  0520 04/08/21  0506 04/07/21  1629   POTASSIUM 3.4 3.6 4.0   CHLORIDE 109 110* 108   BUN 27 24 22       DISCHARGE INSTRUCTIONS:  Discharge Orders      Basic metabolic panel     Follow-Up with Cardiac Advanced Practice Provider      CARDIAC REHAB REFERRAL      Home Care PT Referral for Hospital Discharge      Reason for your hospital stay    Endovascular aortica aneurysm repair and femoral to femoral bypass     Follow Up and recommended labs and tests    Follow up with vascular surgery DAYANA in 2 weeks and with Dr. Ferrara in 4 weeks.  You will have an WILFRED, ultrasound, and CT scan prior to your visit.    With questions, concerns, or to request an appointment, please call either:    Anabelle Deleon, Care Coordinator RN, Vascular Surgery  [phone]    Vascular Call Center  [phone]    To contact someone after 5 pm, on a weekend, or on a Holiday, please call:  [facility]  [phone], option 4 to have the attending physician for Vascular Surgery Service paged.     Reason for your hospital stay    Endovascular Aortic Aneurysm Repair and [id number] bypass graft.     Follow Up and recommended labs and tests    Follow up with Jessica Bunn, vascular surgery nurse practitioner in 2 weeks and with Dr. Ferrara in 4 weeks (these appointments will be virtual).  You will have ultrasounds and an WILFRED done prior to your visit with Dr. Ferrara.    With questions, concerns, or to request an appointment, please call either:    Anabelle Deleon Care Coordinator RN, Vascular Surgery  [phone]    [facility]  [phone]    To contact someone after 5 pm,  on a weekend, or on a Holiday, please call:  [facility]  [phone], option 4 to have the attending physician for Vascular Surgery Service paged.     Activity    No strenuous activity including no pulling, pushing, or lifting >10lbs until cleared by vascular surgery. Walking (within reasonable limits) is encouraged to prevent muscle deconditioning and blood clots     Wound care and dressings    Instructions to care for your wound at home:  Your surgical incision sites were closed with sutures under the skin that will dissolve on their own and dermabond (which is somewhat like surgical super glue) to seal the site closed. You can get those incisions wet, but avoid soaking/submerging them for the next 3 weeks to allow proper healing. You should also avoid rubbing or abrading the glue. It will slowly dissolve or fall off on its own. If at the incision site you start having new/different discharge/drainage, foul smell, or redness and warmth to the touch, you should call the vascular surgery office. Also call the office if you have a fever of 100.5F or greater     Full Code     Walker    DME Documentation:   Describe the reason for need to support medical necessity: s/p femoral to femoral bypass, chronic illness, and deconditioning.     I, the undersigned, certify that the above prescribed supplies are medically necessary for this patient and is both reasonable and necessary in reference to accepted standards of medical and necessary in reference to accepted standards of medical practice in the treatment of this patient's condition and is not prescribed as a convenience.     Diet    Follow this diet upon discharge: Orders Placed This Encounter      Regular Diet Adult.  Ensure you are drinking plenty of fluids.     Discharge Medications   Current Discharge Medication List      START taking these medications    Details   aspirin (ASA) 81 MG chewable tablet Take 1 tablet (81 mg) by mouth daily     Associated Diagnoses: Abdominal aortic aneurysm (AAA) without rupture (H); ST elevation myocardial infarction (STEMI), unspecified artery (H); Peripheral artery disease (H)      lisinopril (ZESTRIL) 5 MG tablet Take 1 tablet (5 mg) by mouth daily  Qty: 60 tablet, Refills: 4    Associated Diagnoses: Abdominal aortic aneurysm (AAA) without rupture (H); ST elevation myocardial infarction (STEMI), unspecified artery (H); Peripheral artery disease (H)      oxyCODONE (ROXICODONE) 5 MG tablet Take 1 tablet (5 mg) by mouth every 6 hours as needed for moderate to severe pain  Qty: 12 tablet, Refills: 0    Associated Diagnoses: Abdominal aortic aneurysm (AAA) without rupture (H); ST elevation myocardial infarction (STEMI), unspecified artery (H); Peripheral artery disease (H)      ticagrelor (BRILINTA) 90 MG tablet Take 1 tablet (90 mg) by mouth every 12 hours  Qty: 60 tablet, Refills: 4    Associated Diagnoses: Abdominal aortic aneurysm (AAA) without rupture (H); ST elevation myocardial infarction (STEMI), unspecified artery (H); Peripheral artery disease (H)         CONTINUE these medications which have CHANGED    Details   acetaminophen (TYLENOL) 325 MG tablet Take 2 tablets (650 mg) by mouth every 4 hours as needed for other (For optimal non-opioid multimodal pain management to improve pain control.)    Associated Diagnoses: Abdominal aortic aneurysm (AAA) without rupture (H); ST elevation myocardial infarction (STEMI), unspecified artery (H); Peripheral artery disease (H)      atorvastatin (LIPITOR) 80 MG tablet Take 1 tablet (80 mg) by mouth daily  Qty: 60 tablet, Refills: 4    Associated Diagnoses: Abdominal aortic aneurysm (AAA) without rupture (H); ST elevation myocardial infarction (STEMI), unspecified artery (H); Peripheral artery disease (H)         CONTINUE these medications which have NOT CHANGED    Details   calcium carbonate 600 mg-vitamin D 400 units (CALTRATE) 600-400 MG-UNIT per tablet Take 1 tablet by  mouth 2 times daily      Lactobacillus-Inulin (Global FilmdemicE DIGESTIVE HEALTH) CAPS TAKE ONE CAPSULE BY MOUTH EVERY DAY (DUE FOR PHYSICAL IN MARCH)  Qty: 90 capsule, Refills: 3    Associated Diagnoses: Diarrhea, unspecified type      losartan (COZAAR) 50 MG tablet Take 1 tablet (50 mg) by mouth daily  Qty: 90 tablet, Refills: 1    Associated Diagnoses: Renal hypertension, stage 1-4 or unspecified chronic kidney disease      metoprolol tartrate (LOPRESSOR) 100 MG tablet Take 1 tablet (100 mg) by mouth 2 times daily  Qty: 180 tablet, Refills: 1    Associated Diagnoses: Hypertension secondary to other renal disorders      mycophenolic acid (GENERIC EQUIVALENT) 360 MG EC tablet Take 1 tablet (360 mg) by mouth 2 times daily  Qty: 60 tablet, Refills: 1    Associated Diagnoses: Kidney replaced by transplant; Kidney transplanted; Aftercare following organ transplant; Immunosuppressive management encounter following kidney transplant      predniSONE (DELTASONE) 5 MG tablet Take 1 tablet (5 mg) by mouth daily  Qty: 90 tablet, Refills: 0    Comments: Appointment needed  Associated Diagnoses: Kidney replaced by transplant      amoxicillin (AMOXIL) 500 MG capsule Take 4 capsules (2,000 mg) by mouth once as needed Prior to dental procedures  Qty: 16 capsule, Refills: 3    Associated Diagnoses: Kidney replaced by transplant         STOP taking these medications       apixaban ANTICOAGULANT (ELIQUIS) 2.5 MG tablet Comments:   Reason for Stopping:         cilostazol (PLETAL) 100 MG tablet Comments:   Reason for Stopping:         NIFEdipine ER OSMOTIC (PROCARDIA XL) 90 MG 24 hr tablet Comments:   Reason for Stopping:             Allergies   Allergies   Allergen Reactions     Blood Transfusion Related (Informational Only) Other (See Comments)     Patient has a history of a clinically significant antibody against RBC antigens.  A delay in compatible RBCs may occur.     Ultracet Nausea and Vomiting and Hives     Hydrocodone Nausea and  Vomiting and Sheba Salgado MD   Division of Vascular Surgery   [facility]  Pager: [phone]

## 2021-04-09 NOTE — PLAN OF CARE
Neuro: A&Ox4.   Cardiac: SR. VSS.   Respiratory: Sating adequately on 3L NC  GI/: Adequate urine output. BM X2  Diet/appetite: Tolerating clear liquid diet.   Activity:  Assist of SBA, up to commode.   Pain: At acceptable level on current regimen.   Skin: No new deficits noted.  LDA's: PIV x2    Plan: Continue with POC. Notify primary team with changes. Pt slept well overnight.

## 2021-04-09 NOTE — PROGRESS NOTES
Care Management Follow Up    Length of Stay (days): 3    Expected Discharge Date: 04/10/21     Concerns to be Addressed:       Patient plan of care discussed at interdisciplinary rounds: Yes    Anticipated Discharge Disposition: Home     Anticipated Discharge Services: Meals on Wheels  Anticipated Discharge DME: Walker    Patient/family educated on Medicare website which has current facility and service quality ratings:  Yes  Education Provided on the Discharge Plan:  Yes  Patient/Family in Agreement with the Plan: Yes    Referrals Placed by CM/SW:  [facility]                                                     #[phone]      Additional Information:  Per Vascular Surgery, Pt will likely be discharged to home tomorrow. I have met with Pt to assist with discharge planning. Home Care follow up discussed which Pt is agreeable to.  Referral made to [facility] AVERY Ramirez, PT.  I have added the above services to the discharge orders.      Dyana Brady RN   6B care coordinator #[phone]

## 2021-04-10 ENCOUNTER — APPOINTMENT (OUTPATIENT)
Dept: PHYSICAL THERAPY | Facility: CLINIC | Age: 71
DRG: 268 | End: 2021-04-10
Attending: SURGERY
Payer: COMMERCIAL

## 2021-04-10 VITALS
RESPIRATION RATE: 16 BRPM | BODY MASS INDEX: 19.47 KG/M2 | HEART RATE: 70 BPM | OXYGEN SATURATION: 91 % | HEIGHT: 62 IN | WEIGHT: 105.82 LBS | DIASTOLIC BLOOD PRESSURE: 83 MMHG | TEMPERATURE: 98.8 F | SYSTOLIC BLOOD PRESSURE: 138 MMHG

## 2021-04-10 LAB
ANION GAP SERPL CALCULATED.3IONS-SCNC: 10 MMOL/L (ref 3–14)
BUN SERPL-MCNC: 29 MG/DL (ref 7–30)
CALCIUM SERPL-MCNC: 8.8 MG/DL (ref 8.5–10.1)
CHLORIDE SERPL-SCNC: 106 MMOL/L (ref 94–109)
CO2 SERPL-SCNC: 22 MMOL/L (ref 20–32)
CREAT SERPL-MCNC: 1.85 MG/DL (ref 0.52–1.04)
GFR SERPL CREATININE-BSD FRML MDRD: 27 ML/MIN/{1.73_M2}
GLUCOSE SERPL-MCNC: 91 MG/DL (ref 70–99)
INTERPRETATION ECG - MUSE: NORMAL
POTASSIUM SERPL-SCNC: 3.2 MMOL/L (ref 3.4–5.3)
SODIUM SERPL-SCNC: 138 MMOL/L (ref 133–144)

## 2021-04-10 PROCEDURE — 250N000012 HC RX MED GY IP 250 OP 636 PS 637: Performed by: SURGERY

## 2021-04-10 PROCEDURE — 250N000013 HC RX MED GY IP 250 OP 250 PS 637: Performed by: INTERNAL MEDICINE

## 2021-04-10 PROCEDURE — 97116 GAIT TRAINING THERAPY: CPT | Mod: GP

## 2021-04-10 PROCEDURE — 250N000013 HC RX MED GY IP 250 OP 250 PS 637: Performed by: SURGERY

## 2021-04-10 PROCEDURE — 99024 POSTOP FOLLOW-UP VISIT: CPT | Performed by: SURGERY

## 2021-04-10 PROCEDURE — 36415 COLL VENOUS BLD VENIPUNCTURE: CPT | Performed by: SURGERY

## 2021-04-10 PROCEDURE — 97530 THERAPEUTIC ACTIVITIES: CPT | Mod: GP

## 2021-04-10 PROCEDURE — 80048 BASIC METABOLIC PNL TOTAL CA: CPT | Performed by: SURGERY

## 2021-04-10 RX ADMIN — MYCOPHENOLIC ACID 360 MG: 360 TABLET, DELAYED RELEASE ORAL at 09:02

## 2021-04-10 RX ADMIN — LISINOPRIL 5 MG: 5 TABLET ORAL at 09:02

## 2021-04-10 RX ADMIN — TICAGRELOR 90 MG: 90 TABLET ORAL at 09:02

## 2021-04-10 RX ADMIN — ATORVASTATIN CALCIUM 80 MG: 80 TABLET, FILM COATED ORAL at 09:02

## 2021-04-10 RX ADMIN — ASPIRIN 81 MG CHEWABLE TABLET 81 MG: 81 TABLET CHEWABLE at 09:02

## 2021-04-10 RX ADMIN — Medication 1 TABLET: at 09:02

## 2021-04-10 RX ADMIN — PREDNISONE 5 MG: 5 TABLET ORAL at 09:02

## 2021-04-10 RX ADMIN — METOPROLOL TARTRATE 100 MG: 50 TABLET, FILM COATED ORAL at 09:02

## 2021-04-10 ASSESSMENT — ACTIVITIES OF DAILY LIVING (ADL)
ADLS_ACUITY_SCORE: 15

## 2021-04-10 ASSESSMENT — MIFFLIN-ST. JEOR
SCORE: 963.25
SCORE: 915.25

## 2021-04-10 NOTE — PROVIDER NOTIFICATION
"1815: Page to cross cover vascular surgery \"Pt having multiple loose bowel movements. Requesting another dose of Imodium.\" Received call back. No more Imodium for now.   "

## 2021-04-10 NOTE — PROVIDER NOTIFICATION
Notified provider via text page about clarification for medications for discharge. Clarified with vascular surgeon that patient should stop taking losartan. She was prescribed lisinopril at discharge.    This writer called patient and spoke with her daughter, Kelli. Updated Kelli on change to medication. Daughter verbalized understanding.

## 2021-04-10 NOTE — PLAN OF CARE
DISCHARGE                         4/10/2021 12:25 PM  ----------------------------------------------------------------------------  Discharged to: Home  Via: private transportation  Accompanied by: Family  Discharge Instructions: Regular diet, activity restrictions, medications, follow up appointments, when to call the MD, aftercare instructions.  Prescriptions: To be filled by discharge/mail order pharmacy; medication list reviewed & sent with pt  Follow Up Appointments: arranged; information given  Belongings: All sent with pt  IV: d/c'd  Telemetry: d/c'd  Pt exhibits understanding of above discharge instructions; all questions answered.    Discharge Paperwork: Signed, copied, and sent home with patient.

## 2021-04-10 NOTE — PLAN OF CARE
Neuro: A&Ox4.   Cardiac: SR. VSS. Occasional PACs noted.    Respiratory: Sating > 95% on RA.  GI/: Adequate urine output. No loose stools/BM on this shift.   Diet/appetite: Tolerating regular diet, no nausea, no vomiting.   Activity:  Up with stand-by assist. Pt ambulated to the commode and bathroom on this shift.   Pain: At acceptable level on current regimen. Pt denied pain.   Skin: No new deficits noted. Pt has significant bruising on bilateral groin and radial surgical sites.   LDA's: Pt has right and left PIVs, both saline locked.     Plan: Continue with POC. Plans to discharge home this morning. Notify primary team with changes.

## 2021-04-10 NOTE — PROGRESS NOTES
"VASCULAR SURGERY PROGRESS NOTE    Subjective:  Sitting up in bed. Not on any oxygen currently. Feeling well.       Objective:    Intake/Output Summary (Last 24 hours) at 4/9/2021 0742  Last data filed at 4/8/2021 1947  Gross per 24 hour   Intake 2306.25 ml   Output 450 ml   Net 1856.25 ml     Labs:  ROUTINE IP LABS (Last four results)  BMP  Recent Labs   Lab 04/10/21  0719 04/09/21  0520 04/08/21  0506 04/07/21  1629    139 141 140   POTASSIUM 3.2* 3.4 3.6 4.0   CHLORIDE 106 109 110* 108   KAYKAY 8.8 8.7 8.4* 8.4*   CO2 22 23 24 25   BUN 29 27 24 22   CR 1.85* 1.87* 1.79* 1.74*   GLC 91 97 117* 133*     CBC  Recent Labs   Lab 04/09/21  0520 04/08/21  1624 04/08/21  0506 04/07/21  1629   WBC 10.3 13.0* 11.9* 10.9   RBC 3.00* 3.36* 2.55* 3.09*   HGB 9.0* 9.6* 7.5* 9.0*   HCT 27.5* 29.9* 23.1* 27.4*   MCV 92 89 91 89   MCH 30.0 28.6 29.4 29.1   MCHC 32.7 32.1 32.5 32.8   RDW 15.0 14.6 14.6 14.4   PLT 97* 113* 105* 117*     INRNo lab results found in last 7 days.  PHYSICAL EXAM:  /83 (BP Location: Left arm)   Pulse 70   Temp 98.8  F (37.1  C) (Oral)   Resp 16   Ht 1.575 m (5' 2\")   Wt 48 kg (105 lb 13.1 oz)   SpO2 91%   BMI 19.35 kg/m    General: The patient is alert and oriented. Appropriate. No acute distress  Psych: pleasant affect, answers questions appropriately  Skin: Color appropriate for race, warm, dry.  Respiratory: The patient does not require supplemental oxygen. Breathing unlabored  GI:  Abdomen soft, nontender to light palpation.  Extremities: Bilateral groin incisions CDI with dermabond. Some bruising in bilateral groins.  She has a multiphasic signal over the bypass graft. She has monophasic R DP signal and R PT signal. Multiphasic L DP, PT signal                 ASSESSMENT:  69 yo lady hx of left kidney DDKT, lung cancer and anal cancer in remission now s/p EVAR with AUI, Amplatzer plug of right common iliac artery, and left to right femoral to femoral bypass using 7 mm Artegraft. " Post-op course complicated by STEMI requiring cardiac catheterization and RCA revascularization with drug eluting stent on POD 0.       Patient had some bloody stool output on POD 1 that was not concerning for bowel ischemia.  She had only some flecks of blood present in the commode and this has essentially resolved.  She continues to have diarrhea which is chronic for her and she takes scheduled imodium at home.  Her Cr is slightly elevated beyond her baseline this morning likely from the contrast load and decreased fluid intake.    PT recommending TCU, however, the patient lives at home with her  who serves as her PCA.  She spends most of her time on one level of the house, and given her immunocompromised condition, we feel she would be safer to go home with in-home PT      PLAN:  -Continue ASA and Brilinta  -Continue lisinopril   -Discussed activity restrictions and follow-up   -Renal diet , promote oral hydration  -OOB and up to chair today  -Discharge home today     Devendra Salgado MD  Vascular Surgery

## 2021-04-10 NOTE — PLAN OF CARE
Occupational Therapy Discharge Summary    Reason for therapy discharge:    Discharged to home with home therapy.    Progress towards therapy goal(s). See goals on Care Plan in [facility] electronic health record for goal details.  Goals partially met.  Barriers to achieving goals:   discharge from facility.    Therapy recommendation(s):    Home PT to progress strength and endurance for ADLs.

## 2021-04-11 ENCOUNTER — PATIENT OUTREACH (OUTPATIENT)
Dept: CARE COORDINATION | Facility: CLINIC | Age: 71
End: 2021-04-11

## 2021-04-11 NOTE — DISCHARGE SUMMARY
Devendra Salgado MD   Physician   Vascular Surgery   Progress Notes   Addendum   Date of Service:  4/10/2021  8:20 AM   Creation Time:  2021  8:20 AM            Addendum        Expand AllCollapse All      []Hide copied text    []Bradver for details  [facility] Vascular Surgery Service Discharge Summary:      NAME: Maribel Yang   MRN: [id number]   : 1950   PCP: Lisa Martinez     DATE OF ADMISSION: 2021            Procedure/Surgery Information     Procedure: Procedure(s):  CV CORONARY ANGIOGRAM  Percutaneous Coronary Intervention Stent Drug Eluting   Surgeon(s): Surgeon(s) and Role:     * Sincere Rosas MD - Primary   Specimens: * No specimens in log *            PRE/POSTOPERATIVE DIAGNOSES:    5.9 cm  AAA     PROCEDURES PERFORMED, 2021:   1.  Endovascular abdominal aortic aneurysm repair with an aorto uniiliac 32 mm Medtronic device 2.  Left ipsilateral limb placement 16 mm x 93 mm, 3.  32 mm x 49 mm proximal aortic extension cuff 4.  16 mm x 16 mm x 82 mm left iliac extension, 5.  7 mm and 16 mm Amplatz plugs in the right common iliac artery, 6.  Femoral-femoral bypass with 7 mm Artegraft 7.  Multiple aortograms     INTRAOPERATIVE FINDINGS: Abdominal Aortic Aneurysm     POSTOPERATIVE COMPLICATIONS: None     DATE OF DISCHARGE: 2021     ADMISSION HPI (from 2021): This 70 year old female with a left pelvic fossa transplanted kidney was found to have a 5.9 cm AAA and an occluded right external iliac artery.  There was antegrade arterial flow into the right common iliac artery which emptied into the internal iliac artery and reconstituted the profunda femoris artery with a right femoral artery occlusion. I recommended endovascular AAA repair via a aorto uniiliac endovascular repair and a femorofemoral bypass along with plugging of the right common iliac artery in order to prevent endoleak.. She understood the risks and benefits and wished to proceed.     HOSPITAL COURSE:  Maribel Yang is a 70 year old female who was admitted on 4/6/2021 and underwent the above-named procedures.  She tolerated the procedure well and postoperatively was transferred to the general post-surgical unit. Unfortunately, that evening she developed chest discomfort and was found to have and inferior STEMI.  She was taken emergently to the cath lab for PCI with LIUDMILA of the proximal RCA and initiated on DAPT.   On POD 1, nursing reported blood in her stool with 2 bowel movements, but the patient was asymptomatic.  Upon evaluation, there were mild flecks of blood in the commode thought to be more consistent with her chronic diarrhea.  The patient had no nausea, vomiting, or abdominal pain post-operatively.  Her Cr was mildly elevated above her baseline of 1.7 and this was thought to be due to the contrast load from her procedure and decreased oral intake with diarrhea.  Fluids were promoted and she is scheduled to have a recheck Cr within a week.  PT had been recommending TCU given her deconditioning, however, the patient has a strong support system at home.  Her  is healthy and serves as her PCA.  She reports she mostly stays on one level of the house and can adapt in the home to avoid stairs.  Given her immunocompromised condition, our team felt it would be safer for the patient to return home with PT and she is in agreement with this plan.  She is scheduled to follow up with outpatient cardiac rehab and has follow up arranged with cardiology. Cardiology recommended being discharged with lisinopril and stopping the nifedipine. The remainder of her course was essentiallly uncomplicated.  Prior to discharge, her pain was controlled well with tylenol and oxycodone.  She was able to perform ADLs and ambulate independently without difficulty, and had full return of bowel and bladder function.  On 4/10//2021, she was discharged home in stable condition.     Vascular Surgery Core Measures:   Atorvastatin  increased to 80mg, Aspirin and Brillinta.  Will hold DOAC until Brillinta discontinued.     Physical Exam:  General: The patient is alert and oriented. Appropriate. No acute distress  Psych: pleasant affect, answers questions appropriately  Skin: Color appropriate for race, warm, dry.  Respiratory: The patient does not require supplemental oxygen. Breathing unlabored  GI:  Abdomen soft, nontender to light palpation.  Extremities: Bilateral groin incisions CDI with dermabond. Some bruising in bilateral groins.  She has a multiphasic signal over the bypass graft. She has monophasic R DP signal and R PT signal. Multiphasic L DP, PT signal                      PAST MEDICAL HISTORY:  Past Medical History        Past Medical History:   Diagnosis Date     Abnormal coagulation profile       p [id number] heterozygote      Age-related osteoporosis without current pathological fracture 6/22/2019     Anemia       Antiplatelet or antithrombotic long-term use       ASCUS with positive high risk HPV 2007, 2015     + HPV 56, 54,& 6, colp - TAL III, Leep =TAL II     Basal cell carcinoma       Depressive disorder July 2015     Hypertension       Immunosuppressed status (H)       due meds     Kidney replaced by transplant 9/04     Living donor recipient,  Rejection 7/2005     LSIL (low grade squamous intraepithelial lesion) on Pap smear 4/2013     +HPV 33 or 45, 61       PAD (peripheral artery disease) (H)       PONV (postoperative nausea and vomiting)       Squamous cell lung cancer (H)       Thrombosis of leg 1967     Unspecified disorder of kidney and ureter       X-linked dominant Alport's syndrome.            PAST SURGICAL HISTORY:  Past Surgical History         Past Surgical History:   Procedure Laterality Date     BIOPSY         Kidney, Lung, Breast     BIOPSY ANAL N/A 3/14/2018     Procedure: BIOPSY ANAL;;  Surgeon: Shabbir Leo MD;  Location:  OR      TRANSPLANTATION OF KIDNEY   9/04     recipient -- done at [facility]      COLONOSCOPY         COLONOSCOPY N/A 8/9/2017     Procedure: COMBINED COLONOSCOPY, SINGLE OR MULTIPLE BIOPSY/POLYPECTOMY BY BIOPSY;;  Surgeon: Sushil Hyatt MD;  Location:  GI     COLPOSCOPY,LOOP ELECTRD CERVIX EXCIS   03/11/08     TAL II     CONIZATION LEEP   7/17/2013     Procedure: CONIZATION LEEP;;  Surgeon: Liliana Renteria MD;  Location: [facility] OR     CONIZATION LEEP N/A 8/17/2016     Procedure: CONIZATION LEEP;  Surgeon: Liliana Renteria MD;  [facility]     CV CORONARY ANGIOGRAM N/A 4/6/2021     Procedure: CV CORONARY ANGIOGRAM;  Surgeon: Sincere Rosas MD;  Location:  HEART CARDIAC CATH LAB     CV PCI STENT DRUG ELUTING N/A 4/6/2021     Procedure: Percutaneous Coronary Intervention Stent Drug Eluting;  Surgeon: Sincere Rosas MD;  Location:  HEART CARDIAC CATH LAB     EXAM UNDER ANESTHESIA ANUS   7/15/2014     Procedure: EXAM UNDER ANESTHESIA ANUS;  Surgeon: Radha Musa MD;  [facility]     EXAM UNDER ANESTHESIA ANUS N/A 3/14/2018     Procedure: EXAM UNDER ANESTHESIA ANUS;  Anal Exam Under Anesthesia With Excision of anal lesion, proctoscopy;  Surgeon: Shabbir Leo MD;  Location:  OR     EYE SURGERY         GENITOURINARY SURGERY         IR OR ANGIOGRAM   4/6/2021     LASER CO2 EXCISE VULVA WIDE LOCAL   7/15/2014     Procedure: LASER CO2 EXCISE VULVA WIDE LOCAL;  Surgeon: Liliana Renteria MD;  [facility]     LASER CO2 VAGINA   7/17/2013     Procedure: LASER CO2 VAGINA;;  Surgeon: Liliana Renteria MD;  [facility]     LASER CO2 VAGINA N/A 9/25/2018     Procedure: LASER CO2 VAGINA;  Exam Under Anesthesia, CO2 Laser Ablation of Upper Vagina and Cervix;  Surgeon: Pati Garcia MD;  Location: [facility]     MICROSCOPY ANAL   7/17/2013     Procedure: MICROSCOPY ANAL;  Anal Microscopy,  EUA vagina,Colposcopy Of Vagina And Vulva, Vaginal Biopsies, Omniguide Co2 Laser To Vagina and vulva, Loop Electrosurgical Excision Procedure  To Cervix;  Surgeon: Radha Musa MD;  Location: [facility]     MICROSCOPY ANAL   7/15/2014     Procedure: MICROSCOPY ANAL;  Surgeon: Radha Musa MD;  Location: [facility]     VASCULAR SURGERY         Thrombectomy     ZZC NONSPECIFIC PROCEDURE         Thrombectomy     ZZC NONSPECIFIC PROCEDURE   1955 and 1959     Bilater eye surgery - correction for crossed eyes     ZZC NONSPECIFIC PROCEDURE   1998     oopherectomy L     ZZC NONSPECIFIC PROCEDURE   1967     open kidney biopsy - L               Labs:  Recent Labs   Lab Test 04/09/21  0520 04/08/21  1624   WBC 10.3 13.0*   RBC 3.00* 3.36*   HGB 9.0* 9.6*   HCT 27.5* 29.9*   MCV 92 89   MCH 30.0 28.6   MCHC 32.7 32.1   PLT 97* 113*            Recent Labs   Lab Test 04/09/21  0520 04/08/21  0506 04/07/21  1629   POTASSIUM 3.4 3.6 4.0   CHLORIDE 109 110* 108   BUN 27 24 22         DISCHARGE INSTRUCTIONS:        Discharge Orders         Basic metabolic panel          Follow-Up with Cardiac Advanced Practice Provider       CARDIAC REHAB REFERRAL       Home Care PT Referral for Hospital Discharge       Reason for your hospital stay     Endovascular aortica aneurysm repair and femoral to femoral bypass          Follow Up and recommended labs and tests     Follow up with vascular surgery DAYANA in 2 weeks and with Dr. Ferrara in 4 weeks.  You will have an WILFRED, ultrasound, and CT scan prior to your visit.     With questions, concerns, or to request an appointment, please call either:     Anabelle Deleon, Care Coordinator RN, Vascular Surgery  [phone]     Vascular Call Center  [phone]     To contact someone after 5 pm, on a weekend, or on a Holiday, please call:  [facility]  [phone], option 4 to have the attending physician for Vascular Surgery Service paged.          Reason for your hospital stay     Endovascular Aortic Aneurysm Repair and [id number] bypass graft.          Follow Up and recommended labs and  tests     Follow up with Jessica Bunn, vascular surgery nurse practitioner in 2 weeks and with Dr. Ferrara in 4 weeks (these appointments will be virtual).  You will have ultrasounds and an WILFRED done prior to your visit with Dr. Ferrara.     With questions, concerns, or to request an appointment, please call either:     Anabelle Deleon, Care Coordinator RN, Vascular Surgery  [phone]     [facility]  [phone]     To contact someone after 5 pm, on a weekend, or on a Holiday, please call:  [facility]  [phone], option 4 to have the attending physician for Vascular Surgery Service paged.          Activity     No strenuous activity including no pulling, pushing, or lifting >10lbs until cleared by vascular surgery. Walking (within reasonable limits) is encouraged to prevent muscle deconditioning and blood clots          Wound care and dressings     Instructions to care for your wound at home:  Your surgical incision sites were closed with sutures under the skin that will dissolve on their own and dermabond (which is somewhat like surgical super glue) to seal the site closed. You can get those incisions wet, but avoid soaking/submerging them for the next 3 weeks to allow proper healing. You should also avoid rubbing or abrading the glue. It will slowly dissolve or fall off on its own. If at the incision site you start having new/different discharge/drainage, foul smell, or redness and warmth to the touch, you should call the vascular surgery office. Also call the office if you have a fever of 100.5F or greater      Full Code          Walker     DME Documentation:   Describe the reason for need to support medical necessity: s/p femoral to femoral bypass, chronic illness, and deconditioning.      I, the undersigned, certify that the above prescribed supplies are medically necessary for this patient and is both reasonable and necessary in reference to accepted standards of medical  and necessary in reference to accepted standards of medical practice in the treatment of this patient's condition and is not prescribed as a convenience.          Diet     Follow this diet upon discharge: Orders Placed This Encounter      Regular Diet Adult.  Ensure you are drinking plenty of fluids.            Discharge Medications           Current Discharge Medication List             START taking these medications     Details   aspirin (ASA) 81 MG chewable tablet Take 1 tablet (81 mg) by mouth daily     Associated Diagnoses: Abdominal aortic aneurysm (AAA) without rupture (H); ST elevation myocardial infarction (STEMI), unspecified artery (H); Peripheral artery disease (H)       lisinopril (ZESTRIL) 5 MG tablet Take 1 tablet (5 mg) by mouth daily  Qty: 60 tablet, Refills: 4     Associated Diagnoses: Abdominal aortic aneurysm (AAA) without rupture (H); ST elevation myocardial infarction (STEMI), unspecified artery (H); Peripheral artery disease (H)       oxyCODONE (ROXICODONE) 5 MG tablet Take 1 tablet (5 mg) by mouth every 6 hours as needed for moderate to severe pain  Qty: 12 tablet, Refills: 0     Associated Diagnoses: Abdominal aortic aneurysm (AAA) without rupture (H); ST elevation myocardial infarction (STEMI), unspecified artery (H); Peripheral artery disease (H)       ticagrelor (BRILINTA) 90 MG tablet Take 1 tablet (90 mg) by mouth every 12 hours  Qty: 60 tablet, Refills: 4     Associated Diagnoses: Abdominal aortic aneurysm (AAA) without rupture (H); ST elevation myocardial infarction (STEMI), unspecified artery (H); Peripheral artery disease (H)                 CONTINUE these medications which have CHANGED     Details   acetaminophen (TYLENOL) 325 MG tablet Take 2 tablets (650 mg) by mouth every 4 hours as needed for other (For optimal non-opioid multimodal pain management to improve pain control.)     Associated Diagnoses: Abdominal aortic aneurysm (AAA) without rupture (H); ST elevation myocardial  infarction (STEMI), unspecified artery (H); Peripheral artery disease (H)       atorvastatin (LIPITOR) 80 MG tablet Take 1 tablet (80 mg) by mouth daily  Qty: 60 tablet, Refills: 4     Associated Diagnoses: Abdominal aortic aneurysm (AAA) without rupture (H); ST elevation myocardial infarction (STEMI), unspecified artery (H); Peripheral artery disease (H)                 CONTINUE these medications which have NOT CHANGED     Details   calcium carbonate 600 mg-vitamin D 400 units (CALTRATE) 600-400 MG-UNIT per tablet Take 1 tablet by mouth 2 times daily       Lactobacillus-Inulin ([facility] DIGESTIVE HEALTH) CAPS TAKE ONE CAPSULE BY MOUTH EVERY DAY (DUE FOR PHYSICAL IN MARCH)  Qty: 90 capsule, Refills: 3     Associated Diagnoses: Diarrhea, unspecified type       losartan (COZAAR) 50 MG tablet Take 1 tablet (50 mg) by mouth daily  Qty: 90 tablet, Refills: 1     Associated Diagnoses: Renal hypertension, stage 1-4 or unspecified chronic kidney disease       metoprolol tartrate (LOPRESSOR) 100 MG tablet Take 1 tablet (100 mg) by mouth 2 times daily  Qty: 180 tablet, Refills: 1     Associated Diagnoses: Hypertension secondary to other renal disorders       mycophenolic acid (GENERIC EQUIVALENT) 360 MG EC tablet Take 1 tablet (360 mg) by mouth 2 times daily  Qty: 60 tablet, Refills: 1     Associated Diagnoses: Kidney replaced by transplant; Kidney transplanted; Aftercare following organ transplant; Immunosuppressive management encounter following kidney transplant       predniSONE (DELTASONE) 5 MG tablet Take 1 tablet (5 mg) by mouth daily  Qty: 90 tablet, Refills: 0     Comments: Appointment needed  Associated Diagnoses: Kidney replaced by transplant       amoxicillin (AMOXIL) 500 MG capsule Take 4 capsules (2,000 mg) by mouth once as needed Prior to dental procedures  Qty: 16 capsule, Refills: 3     Associated Diagnoses: Kidney replaced by transplant                STOP taking these medications         apixaban  ANTICOAGULANT (ELIQUIS) 2.5 MG tablet Comments:   Reason for Stopping:            cilostazol (PLETAL) 100 MG tablet Comments:   Reason for Stopping:            NIFEdipine ER OSMOTIC (PROCARDIA XL) 90 MG 24 hr tablet Comments:   Reason for Stopping:                       Allergies            Allergies   Allergen Reactions     Blood Transfusion Related (Informational Only) Other (See Comments)       Patient has a history of a clinically significant antibody against RBC antigens.  A delay in compatible RBCs may occur.     Ultracet Nausea and Vomiting and Hives     Hydrocodone Nausea and Vomiting and Hives            Devendra Salgado MD   Division of Vascular Surgery   [facility]  Pager: [phone]                  Revision History

## 2021-04-12 ENCOUNTER — TELEPHONE (OUTPATIENT)
Dept: VASCULAR SURGERY | Facility: CLINIC | Age: 71
End: 2021-04-12

## 2021-04-12 DIAGNOSIS — I71.40 ABDOMINAL AORTIC ANEURYSM (AAA) WITHOUT RUPTURE (H): Primary | ICD-10-CM

## 2021-04-12 DIAGNOSIS — I82.4Y9 ACUTE DEEP VEIN THROMBOSIS (DVT) OF PROXIMAL VEIN OF LOWER EXTREMITY, UNSPECIFIED LATERALITY (H): ICD-10-CM

## 2021-04-12 DIAGNOSIS — I73.9 PERIPHERAL ARTERY DISEASE (H): ICD-10-CM

## 2021-04-12 DIAGNOSIS — C21.1 MALIGNANT NEOPLASM OF ANAL CANAL (H): ICD-10-CM

## 2021-04-12 NOTE — TELEPHONE ENCOUNTER
Spoke with Maribel regarding how she is doing s/p EVAR and fem fem bypass.   Patient reports their incisional pain is minimal, but she does have a sore back, she said it feels swollen and sore, not a sharp pain, but does not see any visible changes. I encouraged her to ice, heat, take tylenol, and stretch as needed. It is likely related to lack of activity. She also reports burning in the tops of her thighs with walking. She said she expressed this while inpatient, but it started as just one spot, and has increased to both thighs while walking.   Her bilateral groin incisions are C/D/I, but she does report significant ecchymosis, which I told her is not surprising considering she was on anticoagulation pre op.   Patient reports she is eating/drinking per her baseline. I encouraged her to focus on hydration since her creatinine was elevated upon discharge. Patient will have a BMP redrawn tomorrow prior to her cardiac rehab appointment.  Patient is voiding adequately and continues to have diarrhea.  VQI Measures: Patient is prescribed Atorvastatin, which was increased to 80 mg while inpatient, Aspirin, and Brillinta. She confirms she has been taking all 3  as prescribed. She has discontinued her DOAC as well.   Confirmed follow up appointments and provided call back number for further questions/concerns.

## 2021-04-12 NOTE — PROGRESS NOTES
[facility]: Post-Discharge Note  SITUATION                                                      Admission:    Admission Date: 04/06/21   Reason for Admission: Abdominal aortic aneurysm (AAA) without rupture  Discharge:   Discharge Date: 04/10/21  Discharge Diagnosis: Abdominal aortic aneurysm (AAA) without rupture    BACKGROUND                                                      This 70 year old female with a left pelvic fossa transplanted kidney was found to have a 5.9 cm AAA and an occluded right external iliac artery.  There was antegrade arterial flow into the right common iliac artery which emptied into the internal iliac artery and reconstituted the profunda femoris artery with a right femoral artery occlusion. I recommended endovascular AAA repair via a aorto uniiliac endovascular repair and a femorofemoral bypass along with plugging of the right common iliac artery in order to prevent endoleak.. She understood the risks and benefits and wished to proceed.    ASSESSMENT      Discharge Assessment  Patient reports symptoms are: Improved  Does the patient have all of their medications?: Yes  Does patient know what their new medications are for?: Yes  Does patient have a follow-up appointment scheduled?: Yes  Does patient have any other questions or concerns?: No    Post-op  Did the patient have surgery or a procedure: Yes  Fever: No  Chills: No  Eating & Drinking: eating and drinking without complaints/concerns  Bowel Function: normal  Urinary Status: voiding without complaint/concerns        PLAN                                                      Outpatient Plan:     Follow up with vascular surgery DAYANA in 2 weeks and with Dr. Ferrara in 4 weeks.  You will have an WILFRED, ultrasound, and CT scan prior to your visit.     With questions, concerns, or to request an appointment, please call either:     Anabelle Deleon, Care Coordinator RN, Vascular Surgery  [phone]         Future Appointments   Date Time  Provider Department Center   4/13/2021  2:30 PM 1, Ur Cardiac Rehab [facility] [place]   4/19/2021  1:00 PM Jessica Bunn, DOMINIC [facility]   5/4/2021 12:30 PM [facility]   5/4/2021  1:30 PM [facility]   5/4/2021  2:30 PM [facility]   5/5/2021 10:30 AM Abena Ferrara MD [facility]   7/13/2021  3:30 PM Shabbir Leo MD [facility]   9/14/2021 10:40 AM  [facility]   3/1/2022  1:30 PM UC LAB UCLAB [facility]   3/1/2022  2:00 PM [facility]   3/4/2022  2:30 PM Angelica Ribeiro PA-C [facility]           Jen Reese

## 2021-04-12 NOTE — PLAN OF CARE
Physical Therapy Discharge Summary    Reason for therapy discharge:    Discharged to home with home therapy.    Progress towards therapy goal(s). See goals on Care Plan in [facility] electronic health record for goal details.  Goals partially met.  Barriers to achieving goals:   discharge from facility.    Therapy recommendation(s):    Continued therapy is recommended.  Rationale/Recommendations:  Home PT to maximize functional independence, safety, strength, and balance.

## 2021-04-13 ENCOUNTER — HOSPITAL ENCOUNTER (OUTPATIENT)
Dept: CARDIAC REHAB | Facility: CLINIC | Age: 71
End: 2021-04-13
Attending: INTERNAL MEDICINE
Payer: COMMERCIAL

## 2021-04-13 DIAGNOSIS — I21.3 ST ELEVATION MYOCARDIAL INFARCTION (STEMI), UNSPECIFIED ARTERY (H): ICD-10-CM

## 2021-04-13 LAB
ANION GAP SERPL CALCULATED.3IONS-SCNC: 7 MMOL/L (ref 3–14)
BUN SERPL-MCNC: 31 MG/DL (ref 7–30)
CALCIUM SERPL-MCNC: 8.9 MG/DL (ref 8.5–10.1)
CHLORIDE SERPL-SCNC: 105 MMOL/L (ref 94–109)
CO2 SERPL-SCNC: 24 MMOL/L (ref 20–32)
CREAT SERPL-MCNC: 1.6 MG/DL (ref 0.52–1.04)
GFR SERPL CREATININE-BSD FRML MDRD: 33 ML/MIN/{1.73_M2}
GLUCOSE SERPL-MCNC: 112 MG/DL (ref 70–99)
POTASSIUM SERPL-SCNC: 4.6 MMOL/L (ref 3.4–5.3)
SODIUM SERPL-SCNC: 136 MMOL/L (ref 133–144)

## 2021-04-13 PROCEDURE — 80048 BASIC METABOLIC PNL TOTAL CA: CPT | Performed by: SURGERY

## 2021-04-13 PROCEDURE — 93798 PHYS/QHP OP CAR RHAB W/ECG: CPT

## 2021-04-13 PROCEDURE — 36415 COLL VENOUS BLD VENIPUNCTURE: CPT | Performed by: SURGERY

## 2021-04-15 ENCOUNTER — TELEPHONE (OUTPATIENT)
Dept: FAMILY MEDICINE | Facility: CLINIC | Age: 71
End: 2021-04-15

## 2021-04-15 ENCOUNTER — TELEPHONE (OUTPATIENT)
Dept: NEPHROLOGY | Facility: CLINIC | Age: 71
End: 2021-04-15
Payer: COMMERCIAL

## 2021-04-15 NOTE — TELEPHONE ENCOUNTER
[employer] Free Drug Application Approved  Effective Dates 4/14/21 -12/31/21  Patient notified? Left message to advise patient that they were approved for the [employer] patient assistance program and can call [employer] directly to schedule delivery at phone#[phone]  Additional Information:

## 2021-04-15 NOTE — TELEPHONE ENCOUNTER
PN,   FYI:  Gave verbal ok for homecare orders:  Skilled Nursing for education related to pain and constipation   PT and OT eval     Homecare also said pt told her Losartan was to be stopped but she doesn't see notes about that  It's on her continue these meds list also from hospital   Found note where clarification was done:         Informed homecare pt is correct and should not be on Losartan   Took this off her med list too   Thanks,  Dannielle DSOUZA RN

## 2021-04-19 ENCOUNTER — TELEPHONE (OUTPATIENT)
Dept: FAMILY MEDICINE | Facility: CLINIC | Age: 71
End: 2021-04-19

## 2021-04-19 ENCOUNTER — TELEPHONE (OUTPATIENT)
Dept: VASCULAR SURGERY | Facility: CLINIC | Age: 71
End: 2021-04-19

## 2021-04-19 ENCOUNTER — VIRTUAL VISIT (OUTPATIENT)
Dept: VASCULAR SURGERY | Facility: CLINIC | Age: 71
End: 2021-04-19
Payer: COMMERCIAL

## 2021-04-19 DIAGNOSIS — I71.40 ABDOMINAL AORTIC ANEURYSM (AAA) WITHOUT RUPTURE (H): Primary | ICD-10-CM

## 2021-04-19 DIAGNOSIS — M54.42 BILATERAL LOW BACK PAIN WITH LEFT-SIDED SCIATICA, UNSPECIFIED CHRONICITY: ICD-10-CM

## 2021-04-19 DIAGNOSIS — I21.11 ST ELEVATION MYOCARDIAL INFARCTION INVOLVING RIGHT CORONARY ARTERY (H): ICD-10-CM

## 2021-04-19 DIAGNOSIS — I73.9 PERIPHERAL ARTERY DISEASE (H): ICD-10-CM

## 2021-04-19 PROCEDURE — 99024 POSTOP FOLLOW-UP VISIT: CPT | Performed by: NURSE PRACTITIONER

## 2021-04-19 RX ORDER — LIDOCAINE 50 MG/G
1 PATCH TOPICAL EVERY 24 HOURS
Qty: 5 PATCH | Refills: 0 | Status: ON HOLD | OUTPATIENT
Start: 2021-04-19 | End: 2021-05-22

## 2021-04-19 RX ORDER — METHOCARBAMOL 500 MG/1
500 TABLET, FILM COATED ORAL 4 TIMES DAILY PRN
Qty: 12 TABLET | Refills: 0 | Status: SHIPPED | OUTPATIENT
Start: 2021-04-19 | End: 2021-04-30

## 2021-04-19 ASSESSMENT — PAIN SCALES - GENERAL: PAINLEVEL: SEVERE PAIN (6)

## 2021-04-19 NOTE — LETTER
4/19/2021       RE: Maribel Yang  [address]  [place] [address]     Dear Colleague,    Thank you for referring your patient, Maribel Yang, to the [facility] [place] at [facility]. Please see a copy of my visit note below.    Vascular Surgery Post-op Follow up:    Maribel is a 70 year old who is being evaluated via a billable telephone visit.      What phone number would you like to be contacted at? [phone]  How would you like to obtain your AVS? [facility]    Assessment & Plan   Problem List Items Addressed This Visit     Peripheral artery disease (H)    Relevant Medications    methocarbamol (ROBAXIN) 500 MG tablet    lidocaine (LIDODERM) 5 % patch    Other Relevant Orders    CARDIOLOGY EVAL ADULT REFERRAL    Lumbago    Relevant Medications    methocarbamol (ROBAXIN) 500 MG tablet    lidocaine (LIDODERM) 5 % patch    Abdominal aortic aneurysm (AAA) without rupture (H) - Primary    Relevant Medications    methocarbamol (ROBAXIN) 500 MG tablet    lidocaine (LIDODERM) 5 % patch    Other Relevant Orders    CARDIOLOGY EVAL ADULT REFERRAL      Other Visit Diagnoses     ST elevation myocardial infarction involving right coronary artery (H)        Relevant Medications    methocarbamol (ROBAXIN) 500 MG tablet    lidocaine (LIDODERM) 5 % patch    Other Relevant Orders    CARDIOLOGY EVAL ADULT REFERRAL           -continue heat and ice to back  -Will send in for 3 days of robaxin and lidocaine patches for back pain  -Continue to work with in home PT, increase ambulation as able  -Follow up referral made to cardiology  -Quarter to half strength miralax or fiber to regulate bowel movements  -Follow up with Dr. Ferrara in 2 weeks with WILFRED and duplex prior to the visit.    Jessica Bunn NP  [facility] [place]    Subjective   Maribel is a 70 year old who presents for the following health issues:    HPI     Maribel  Maddie Yang is a 70 year old female with PMH significant for left renal transplant, lung cancer, HTN, DVT, who was found to have a 5.9cm AAA and occluded right external iliac artery.  She is s/p EVAR and fem-fem bypass on 4/10/2021 with Dr. Ferrara.  On POD 0, she started experiencing chest pain and was found to have a STEMI.  She underwent PCI of the RCA.  Since discharge, she reports she has been struggling with musculoskeletal low back pain.  The pain is partially relieved with a heating pad, but she cannot take NSAIDs due to her renal transplant.  The pain is limiting her mobility, but she is continuing to work with in-home PT.  She has also been struggling with bowel regulation.  Currently, she feels like she is constipated, but if she takes the senna tablets, she finds she has to rush to the bathroom, which she has had difficulty with her back pain, therefore, she is not taking it.  She has stopped her imodium with the constipation.  She denies nausea or vomiting. During her hospital stay there was concern over some scan bleeding in her stool, she denies any further issues with this since discharge.  No new chest pain, but she complains that she is having difficulty sleeping at night due to shortness of breath. She finds she can nap during the day in her recliner.   She was prescribed cardiac rehab by cardiology, but was unable to complete it or walk on the treadmill. She states she has had no other follow up with cards.  Her incision are healing well, she denies redness, swelling, or drainage.  She reports her surgical pain was minimal and that the back pain is her biggest issue right now.      Review of Systems   Constitutional, HEENT, cardiovascular, pulmonary, gi and gu systems are negative, except as otherwise noted.      Objective    Vitals - Patient Reported  Pain Score: Severe Pain (6)  Pain Loc: Low Back    Physical Exam   healthy, alert and no distress  PSYCH: Alert and oriented times 3; coherent  speech, normal   rate and volume, able to articulate logical thoughts, able   to abstract reason, no tangential thoughts, no hallucinations   or delusions  Her affect is normal  RESP: No cough, no audible wheezing, able to talk in full sentences  Remainder of exam unable to be completed due to telephone visits    Phone call duration: 15 minutes    Again, thank you for allowing me to participate in the care of your patient.      Sincerely,    Jessica Bunn NP

## 2021-04-19 NOTE — NURSING NOTE
Vascular Rooming Note     Maribel Yang's goals for this visit include:   Chief Complaint   Patient presents with     CLEMENT López, is participating in a virtual visit today for a 2 week post-op EVAR & fem fem bypass,experiencing pain low back area, as reported by patient.     Kianna Erickson LPN

## 2021-04-19 NOTE — PROGRESS NOTES
Vascular Surgery Post-op Follow up:    Maribel is a 70 year old who is being evaluated via a billable telephone visit.      What phone number would you like to be contacted at? [phone]  How would you like to obtain your AVS? [facility]    Assessment & Plan   Problem List Items Addressed This Visit     Peripheral artery disease (H)    Relevant Medications    methocarbamol (ROBAXIN) 500 MG tablet    lidocaine (LIDODERM) 5 % patch    Other Relevant Orders    CARDIOLOGY EVAL ADULT REFERRAL    Lumbago    Relevant Medications    methocarbamol (ROBAXIN) 500 MG tablet    lidocaine (LIDODERM) 5 % patch    Abdominal aortic aneurysm (AAA) without rupture (H) - Primary    Relevant Medications    methocarbamol (ROBAXIN) 500 MG tablet    lidocaine (LIDODERM) 5 % patch    Other Relevant Orders    CARDIOLOGY EVAL ADULT REFERRAL      Other Visit Diagnoses     ST elevation myocardial infarction involving right coronary artery (H)        Relevant Medications    methocarbamol (ROBAXIN) 500 MG tablet    lidocaine (LIDODERM) 5 % patch    Other Relevant Orders    CARDIOLOGY EVAL ADULT REFERRAL           -continue heat and ice to back  -Will send in for 3 days of robaxin and lidocaine patches for back pain  -Continue to work with in home PT, increase ambulation as able  -Follow up referral made to cardiology  -Quarter to half strength miralax or fiber to regulate bowel movements  -Follow up with Dr. Ferrara in 2 weeks with WILFRED and duplex prior to the visit.      Jessica Bunn NP  [facility]   Maribel is a 70 year old who presents for the following health issues:    [facility]     Maribel Yang is a 70 year old female with PMH significant for left renal transplant, lung cancer, HTN, DVT, who was found to have a 5.9cm AAA and occluded right external iliac artery.  She is s/p EVAR and fem-fem bypass on 4/10/2021 with Dr. Ferrara.  On POD 0, she started experiencing chest pain and was found to have a  STEMI.  She underwent PCI of the RCA.  Since discharge, she reports she has been struggling with musculoskeletal low back pain.  The pain is partially relieved with a heating pad, but she cannot take NSAIDs due to her renal transplant.  The pain is limiting her mobility, but she is continuing to work with in-home PT.  She has also been struggling with bowel regulation.  Currently, she feels like she is constipated, but if she takes the senna tablets, she finds she has to rush to the bathroom, which she has had difficulty with her back pain, therefore, she is not taking it.  She has stopped her imodium with the constipation.  She denies nausea or vomiting. During her hospital stay there was concern over some scan bleeding in her stool, she denies any further issues with this since discharge.  No new chest pain, but she complains that she is having difficulty sleeping at night due to shortness of breath. She finds she can nap during the day in her recliner.   She was prescribed cardiac rehab by cardiology, but was unable to complete it or walk on the treadmill. She states she has had no other follow up with cards.  Her incision are healing well, she denies redness, swelling, or drainage.  She reports her surgical pain was minimal and that the back pain is her biggest issue right now.      Review of Systems   Constitutional, HEENT, cardiovascular, pulmonary, gi and gu systems are negative, except as otherwise noted.      Objective    Vitals - Patient Reported  Pain Score: Severe Pain (6)  Pain Loc: Low Back        Physical Exam   healthy, alert and no distress  PSYCH: Alert and oriented times 3; coherent speech, normal   rate and volume, able to articulate logical thoughts, able   to abstract reason, no tangential thoughts, no hallucinations   or delusions  Her affect is normal  RESP: No cough, no audible wheezing, able to talk in full sentences  Remainder of exam unable to be completed due to telephone  visits        Phone call duration: 15 minutes

## 2021-04-19 NOTE — TELEPHONE ENCOUNTER
LM for patient regarding telephone visit today with Jessica Bunn at 1:00.    Will try back in 5 min's    Call back number was provided.

## 2021-04-19 NOTE — TELEPHONE ENCOUNTER
Reason for Call:  Home Health Care    Rosalina with [facility] called regarding (reason for call): verbal orders    Orders are needed for this patient. Yes    PT: 1 times a week for 1 week.  2 times a week for 3 weeks    OT: N/A    Skilled Nursing: N/A    Pt Provider: Michelle    Phone Number Homecare Nurse can be reached at: [phone]    Can we leave a detailed message on this number? YES    Phone number patient can be reached at: Other phone number:  N/a*    Best Time: Today    Call taken on 4/19/2021 at 10:46 AM by Ana Laura Elaine

## 2021-04-19 NOTE — PATIENT INSTRUCTIONS
-continue heat and ice to back  -Will send in for 3 days of robaxin and lidocaine patches for back pain  -Lidocaine patches may not be covered by your insurance but are available over-the-counter.  -Continue to work with in home PT, increase ambulation as able  -Follow up referral made to cardiology  -Quarter to half strength miralax or fiber to regulate bowel movements  -Follow up with Dr. Ferrara in 2 weeks with WILFRED and duplex prior to the visit.    With questions, concerns, or to request an appointment, please call either:    Anabelle Deleon, Care Coordinator RN, Vascular Surgery  [phone]    Vascular Call Center  [phone]    To contact someone after 5 pm, on a weekend, or on a Holiday, please call:  [facility]  [phone], option 4 to have the attending physician for Vascular Surgery Service paged.

## 2021-04-20 ENCOUNTER — TELEPHONE (OUTPATIENT)
Dept: VASCULAR SURGERY | Facility: CLINIC | Age: 71
End: 2021-04-20

## 2021-04-20 NOTE — TELEPHONE ENCOUNTER
Left voicemails on patient's home phone and cell phone inquiring about increased redness/warmth near incision in right groin. Call back pending.

## 2021-04-20 NOTE — TELEPHONE ENCOUNTER
[facility] Call Center    Phone Message    May a detailed message be left on voicemail: yes     Reason for Call: Symptoms or Concerns     If patient has red-flag symptoms, warm transfer to triage line    Current symptom or concern: Lumps on legs are hot and Red    Symptoms have been present for: 24 hour(s)    Has patient previously been seen for this? Yes    By : Jessica Bunn NP    Date: 4/19/2021    Are there any new or worsening symptoms? Yes      Action Taken: Message routed to:  [facility] Vascular    Travel Screening: Not Applicable

## 2021-04-21 ENCOUNTER — TELEPHONE (OUTPATIENT)
Dept: VASCULAR SURGERY | Facility: CLINIC | Age: 71
End: 2021-04-21

## 2021-04-21 NOTE — TELEPHONE ENCOUNTER
Left message with Maribel inquiring about redness/irritation around R groin site. Of note the patient reported this yesterday as well  and was instructed to put gauze over the macerated incision with tape to try to dry it out. She called back and left a message saying her home care RN did not think it looked infected, there's no drainage, but it was inflamed so her home care RN recommended icing. In my message today I asked her to call back if she's experiencing drainage, increasing redness down her leg, or if she feels like she has a fever. Otherwise I asked she continue to try to dry it out with the gauze and I would check in tomorrow. Call back number given and will send a [email or web address] message as well.

## 2021-04-21 NOTE — TELEPHONE ENCOUNTER
[facility] Call Center    Phone Message    May a detailed message be left on voicemail: yes     Reason for Call: Pt is having a lot of pain in the groin area. Pt is experiencing redness, and swelling a lot more on the right side of the groin. Pt had procedure done 04/06 with Dr. Ferrara. Please call back to discuss. Thank you    Action Taken: Message routed to:  [facility] Vascular Surgery    Travel Screening: Not Applicable

## 2021-04-22 ENCOUNTER — TELEPHONE (OUTPATIENT)
Dept: CARDIOLOGY | Facility: CLINIC | Age: 71
End: 2021-04-22

## 2021-04-22 ENCOUNTER — TELEPHONE (OUTPATIENT)
Dept: VASCULAR SURGERY | Facility: CLINIC | Age: 71
End: 2021-04-22

## 2021-04-22 DIAGNOSIS — T81.40XA: Primary | ICD-10-CM

## 2021-04-22 RX ORDER — CEPHALEXIN 500 MG/1
500 CAPSULE ORAL 3 TIMES DAILY
Qty: 15 CAPSULE | Refills: 0 | Status: SHIPPED | OUTPATIENT
Start: 2021-04-22 | End: 2021-04-23

## 2021-04-22 NOTE — TELEPHONE ENCOUNTER
"Pt needs to schedule an appointment with a general cardiologist per a referral.    CARDIOLOGY EVAL ADULT REFERRAL HAS BEEN CANCELLED; can be found in the \"finalized requests\" tab of the patient's appointment desk.    PLEASE REINSTATE REFERRAL REQUEST (by right clicking and selecting \"reinstate\") AND LINK TO APPOINTMENT WHEN SCHEDULING.   "

## 2021-04-22 NOTE — PROGRESS NOTES
Spoke with Maribel regarding infected incision near right groin. Patient began experiencing some pain and redness on Tuesday afternoon. She sent in photos that were routed to Jessica Bunn NP, and had her home care nurse look as well. At that time incision did not look infected, it did appear macerated and inflamed, so patient began icing and was instructed to place dry gauze over it to try to dry it out. Yesterday afternoon patient reported more pain, denied oozing or increased redness. Today she submitted new photos via [employer], underneath the incision is noticeably more red (see mychart note from patient this morning) she reported low grade fevers, and more pain in the leg. Keflex 500mg TID was prescribed and patient was notified. Patient reports significant pain around the incision to the point she has been unable to sleep at night and is wondering if she can get anything until the antibiotics start working. Will route to Dr. Ferrara.    ** Plan for patient will be seen in person by Jessica on 4/29 at 230 pm for an incision check.

## 2021-04-23 ENCOUNTER — TELEPHONE (OUTPATIENT)
Dept: FAMILY MEDICINE | Facility: CLINIC | Age: 71
End: 2021-04-23

## 2021-04-23 ENCOUNTER — HOSPITAL ENCOUNTER (INPATIENT)
Facility: CLINIC | Age: 71
LOS: 9 days | Discharge: NURSING, REHAB OR LONG-TERM CARE | DRG: 252 | End: 2021-05-02
Attending: EMERGENCY MEDICINE | Admitting: SURGERY
Payer: COMMERCIAL

## 2021-04-23 ENCOUNTER — APPOINTMENT (OUTPATIENT)
Dept: CT IMAGING | Facility: CLINIC | Age: 71
DRG: 252 | End: 2021-04-23
Attending: STUDENT IN AN ORGANIZED HEALTH CARE EDUCATION/TRAINING PROGRAM
Payer: COMMERCIAL

## 2021-04-23 DIAGNOSIS — M54.42 BILATERAL LOW BACK PAIN WITH LEFT-SIDED SCIATICA, UNSPECIFIED CHRONICITY: ICD-10-CM

## 2021-04-23 DIAGNOSIS — I20.1 CORONARY ARTERY VASOSPASM (H): Primary | ICD-10-CM

## 2021-04-23 DIAGNOSIS — T81.41XD INFECTION OF SUPERFICIAL INCISIONAL SURGICAL SITE AFTER PROCEDURE, SUBSEQUENT ENCOUNTER: Primary | ICD-10-CM

## 2021-04-23 DIAGNOSIS — Z11.52 ENCOUNTER FOR SCREENING LABORATORY TESTING FOR SEVERE ACUTE RESPIRATORY SYNDROME CORONAVIRUS 2 (SARS-COV-2): ICD-10-CM

## 2021-04-23 DIAGNOSIS — I21.3 ST ELEVATION MI (STEMI) (H): ICD-10-CM

## 2021-04-23 DIAGNOSIS — I73.9 PERIPHERAL ARTERY DISEASE (H): ICD-10-CM

## 2021-04-23 DIAGNOSIS — T14.8XXA HEMATOMA: ICD-10-CM

## 2021-04-23 DIAGNOSIS — T81.49XA WOUND INFECTION AFTER SURGERY: ICD-10-CM

## 2021-04-23 DIAGNOSIS — I21.11 ST ELEVATION MYOCARDIAL INFARCTION INVOLVING RIGHT CORONARY ARTERY (H): ICD-10-CM

## 2021-04-23 DIAGNOSIS — I71.40 ABDOMINAL AORTIC ANEURYSM (AAA) WITHOUT RUPTURE (H): ICD-10-CM

## 2021-04-23 LAB
ALBUMIN SERPL-MCNC: 2 G/DL (ref 3.4–5)
ALP SERPL-CCNC: 169 U/L (ref 40–150)
ALT SERPL W P-5'-P-CCNC: 15 U/L (ref 0–50)
ANION GAP SERPL CALCULATED.3IONS-SCNC: 9 MMOL/L (ref 3–14)
AST SERPL W P-5'-P-CCNC: 16 U/L (ref 0–45)
BASOPHILS # BLD AUTO: 0 10E9/L (ref 0–0.2)
BASOPHILS NFR BLD AUTO: 0.2 %
BILIRUB SERPL-MCNC: 0.5 MG/DL (ref 0.2–1.3)
BUN SERPL-MCNC: 36 MG/DL (ref 7–30)
CALCIUM SERPL-MCNC: 9.2 MG/DL (ref 8.5–10.1)
CHLORIDE SERPL-SCNC: 102 MMOL/L (ref 94–109)
CO2 SERPL-SCNC: 21 MMOL/L (ref 20–32)
CREAT SERPL-MCNC: 1.63 MG/DL (ref 0.52–1.04)
DIFFERENTIAL METHOD BLD: ABNORMAL
EOSINOPHIL # BLD AUTO: 0 10E9/L (ref 0–0.7)
EOSINOPHIL NFR BLD AUTO: 0.2 %
ERYTHROCYTE [DISTWIDTH] IN BLOOD BY AUTOMATED COUNT: 15.8 % (ref 10–15)
FLUAV RNA RESP QL NAA+PROBE: NEGATIVE
FLUBV RNA RESP QL NAA+PROBE: NEGATIVE
GFR SERPL CREATININE-BSD FRML MDRD: 32 ML/MIN/{1.73_M2}
GLUCOSE SERPL-MCNC: 112 MG/DL (ref 70–99)
HCT VFR BLD AUTO: 29.5 % (ref 35–47)
HGB BLD-MCNC: 9.1 G/DL (ref 11.7–15.7)
IMM GRANULOCYTES # BLD: 0.1 10E9/L (ref 0–0.4)
IMM GRANULOCYTES NFR BLD: 0.6 %
LABORATORY COMMENT REPORT: NORMAL
LACTATE BLD-SCNC: 1.2 MMOL/L (ref 0.7–2)
LYMPHOCYTES # BLD AUTO: 0.2 10E9/L (ref 0.8–5.3)
LYMPHOCYTES NFR BLD AUTO: 1.5 %
MCH RBC QN AUTO: 28.3 PG (ref 26.5–33)
MCHC RBC AUTO-ENTMCNC: 30.8 G/DL (ref 31.5–36.5)
MCV RBC AUTO: 92 FL (ref 78–100)
MONOCYTES # BLD AUTO: 1.2 10E9/L (ref 0–1.3)
MONOCYTES NFR BLD AUTO: 9 %
NEUTROPHILS # BLD AUTO: 11.8 10E9/L (ref 1.6–8.3)
NEUTROPHILS NFR BLD AUTO: 88.5 %
NRBC # BLD AUTO: 0 10*3/UL
NRBC BLD AUTO-RTO: 0 /100
PLATELET # BLD AUTO: 529 10E9/L (ref 150–450)
POTASSIUM SERPL-SCNC: 4.4 MMOL/L (ref 3.4–5.3)
PROT SERPL-MCNC: 6.1 G/DL (ref 6.8–8.8)
RBC # BLD AUTO: 3.21 10E12/L (ref 3.8–5.2)
RSV RNA SPEC QL NAA+PROBE: NEGATIVE
SARS-COV-2 RNA RESP QL NAA+PROBE: NEGATIVE
SODIUM SERPL-SCNC: 132 MMOL/L (ref 133–144)
SPECIMEN SOURCE: NORMAL
TROPONIN I SERPL-MCNC: <0.015 UG/L (ref 0–0.04)
WBC # BLD AUTO: 13.4 10E9/L (ref 4–11)

## 2021-04-23 PROCEDURE — 120N000002 HC R&B MED SURG/OB UMMC

## 2021-04-23 PROCEDURE — 74176 CT ABD & PELVIS W/O CONTRAST: CPT

## 2021-04-23 PROCEDURE — 250N000012 HC RX MED GY IP 250 OP 636 PS 637: Performed by: STUDENT IN AN ORGANIZED HEALTH CARE EDUCATION/TRAINING PROGRAM

## 2021-04-23 PROCEDURE — 96375 TX/PRO/DX INJ NEW DRUG ADDON: CPT

## 2021-04-23 PROCEDURE — 87077 CULTURE AEROBIC IDENTIFY: CPT | Performed by: EMERGENCY MEDICINE

## 2021-04-23 PROCEDURE — 87636 SARSCOV2 & INF A&B AMP PRB: CPT | Performed by: EMERGENCY MEDICINE

## 2021-04-23 PROCEDURE — 85025 COMPLETE CBC W/AUTO DIFF WBC: CPT | Performed by: EMERGENCY MEDICINE

## 2021-04-23 PROCEDURE — 84484 ASSAY OF TROPONIN QUANT: CPT | Performed by: EMERGENCY MEDICINE

## 2021-04-23 PROCEDURE — 96361 HYDRATE IV INFUSION ADD-ON: CPT

## 2021-04-23 PROCEDURE — 96365 THER/PROPH/DIAG IV INF INIT: CPT

## 2021-04-23 PROCEDURE — 83605 ASSAY OF LACTIC ACID: CPT | Performed by: EMERGENCY MEDICINE

## 2021-04-23 PROCEDURE — 87186 SC STD MICRODIL/AGAR DIL: CPT | Performed by: EMERGENCY MEDICINE

## 2021-04-23 PROCEDURE — 258N000003 HC RX IP 258 OP 636: Performed by: EMERGENCY MEDICINE

## 2021-04-23 PROCEDURE — 250N000013 HC RX MED GY IP 250 OP 250 PS 637: Performed by: STUDENT IN AN ORGANIZED HEALTH CARE EDUCATION/TRAINING PROGRAM

## 2021-04-23 PROCEDURE — 250N000011 HC RX IP 250 OP 636: Performed by: EMERGENCY MEDICINE

## 2021-04-23 PROCEDURE — 99285 EMERGENCY DEPT VISIT HI MDM: CPT | Performed by: EMERGENCY MEDICINE

## 2021-04-23 PROCEDURE — 99285 EMERGENCY DEPT VISIT HI MDM: CPT | Mod: 25

## 2021-04-23 PROCEDURE — 96367 TX/PROPH/DG ADDL SEQ IV INF: CPT

## 2021-04-23 PROCEDURE — 80053 COMPREHEN METABOLIC PANEL: CPT | Performed by: EMERGENCY MEDICINE

## 2021-04-23 PROCEDURE — C9803 HOPD COVID-19 SPEC COLLECT: HCPCS

## 2021-04-23 PROCEDURE — 93005 ELECTROCARDIOGRAM TRACING: CPT

## 2021-04-23 PROCEDURE — 74176 CT ABD & PELVIS W/O CONTRAST: CPT | Mod: 26 | Performed by: RADIOLOGY

## 2021-04-23 PROCEDURE — 87800 DETECT AGNT MULT DNA DIREC: CPT | Performed by: EMERGENCY MEDICINE

## 2021-04-23 PROCEDURE — 87040 BLOOD CULTURE FOR BACTERIA: CPT | Performed by: EMERGENCY MEDICINE

## 2021-04-23 PROCEDURE — 99207 PR NO BILLABLE SERVICE THIS VISIT: CPT | Performed by: SURGERY

## 2021-04-23 RX ORDER — HYDROMORPHONE HYDROCHLORIDE 1 MG/ML
0.5 INJECTION, SOLUTION INTRAMUSCULAR; INTRAVENOUS; SUBCUTANEOUS
Status: DISCONTINUED | OUTPATIENT
Start: 2021-04-23 | End: 2021-04-25

## 2021-04-23 RX ORDER — ASPIRIN 81 MG/1
81 TABLET, CHEWABLE ORAL DAILY
Status: DISCONTINUED | OUTPATIENT
Start: 2021-04-24 | End: 2021-05-02 | Stop reason: HOSPADM

## 2021-04-23 RX ORDER — ATORVASTATIN CALCIUM 80 MG/1
80 TABLET, FILM COATED ORAL DAILY
Status: DISCONTINUED | OUTPATIENT
Start: 2021-04-24 | End: 2021-05-02 | Stop reason: HOSPADM

## 2021-04-23 RX ORDER — MYCOPHENOLIC ACID 360 MG/1
360 TABLET, DELAYED RELEASE ORAL 2 TIMES DAILY
Status: DISCONTINUED | OUTPATIENT
Start: 2021-04-23 | End: 2021-05-02 | Stop reason: HOSPADM

## 2021-04-23 RX ORDER — OXYCODONE HYDROCHLORIDE 5 MG/1
5 TABLET ORAL EVERY 6 HOURS PRN
Status: DISCONTINUED | OUTPATIENT
Start: 2021-04-23 | End: 2021-04-24

## 2021-04-23 RX ORDER — LISINOPRIL 5 MG/1
5 TABLET ORAL DAILY
Status: DISCONTINUED | OUTPATIENT
Start: 2021-04-24 | End: 2021-05-02 | Stop reason: HOSPADM

## 2021-04-23 RX ORDER — PREDNISONE 5 MG/1
5 TABLET ORAL DAILY
Status: DISCONTINUED | OUTPATIENT
Start: 2021-04-24 | End: 2021-05-02 | Stop reason: HOSPADM

## 2021-04-23 RX ORDER — CEFTRIAXONE 2 G/1
2 INJECTION, POWDER, FOR SOLUTION INTRAMUSCULAR; INTRAVENOUS ONCE
Status: COMPLETED | OUTPATIENT
Start: 2021-04-23 | End: 2021-04-23

## 2021-04-23 RX ORDER — METOPROLOL TARTRATE 50 MG
100 TABLET ORAL 2 TIMES DAILY
Status: DISCONTINUED | OUTPATIENT
Start: 2021-04-23 | End: 2021-04-25

## 2021-04-23 RX ADMIN — TICAGRELOR 90 MG: 90 TABLET ORAL at 23:06

## 2021-04-23 RX ADMIN — MYCOPHENOLIC ACID 360 MG: 360 TABLET, DELAYED RELEASE ORAL at 23:06

## 2021-04-23 RX ADMIN — CEFTRIAXONE SODIUM 2 G: 2 INJECTION, POWDER, FOR SOLUTION INTRAMUSCULAR; INTRAVENOUS at 20:42

## 2021-04-23 RX ADMIN — METOPROLOL TARTRATE 100 MG: 50 TABLET, FILM COATED ORAL at 23:06

## 2021-04-23 RX ADMIN — VANCOMYCIN HYDROCHLORIDE 750 MG: 1 INJECTION, POWDER, LYOPHILIZED, FOR SOLUTION INTRAVENOUS at 21:21

## 2021-04-23 RX ADMIN — HYDROMORPHONE HYDROCHLORIDE 0.5 MG: 1 INJECTION, SOLUTION INTRAMUSCULAR; INTRAVENOUS; SUBCUTANEOUS at 20:27

## 2021-04-23 ASSESSMENT — ENCOUNTER SYMPTOMS
CHILLS: 1
HEADACHES: 0
COLOR CHANGE: 1
VOMITING: 0
FEVER: 1
CONSTIPATION: 0
NAUSEA: 0
DIARRHEA: 0

## 2021-04-23 ASSESSMENT — MIFFLIN-ST. JEOR
SCORE: 945.47
SCORE: 945.47

## 2021-04-23 NOTE — TELEPHONE ENCOUNTER
PN,    FYI-    [facility] calling for verbal orders for OT after eval today.    Gave verbal orders for:    OT 2x/wk for 2 wk, 1x/wk for 2 wk for upper body exercises, ADL safety and energy conservation.    They will fax orders over also.    Thanks,  Toshia Beckwiht RN

## 2021-04-24 ENCOUNTER — ANESTHESIA (OUTPATIENT)
Dept: SURGERY | Facility: CLINIC | Age: 71
DRG: 252 | End: 2021-04-24
Payer: COMMERCIAL

## 2021-04-24 ENCOUNTER — ANESTHESIA EVENT (OUTPATIENT)
Dept: SURGERY | Facility: CLINIC | Age: 71
DRG: 252 | End: 2021-04-24
Payer: COMMERCIAL

## 2021-04-24 ENCOUNTER — APPOINTMENT (OUTPATIENT)
Dept: CARDIOLOGY | Facility: CLINIC | Age: 71
DRG: 252 | End: 2021-04-24
Attending: ANESTHESIOLOGY
Payer: COMMERCIAL

## 2021-04-24 ENCOUNTER — APPOINTMENT (OUTPATIENT)
Dept: ULTRASOUND IMAGING | Facility: CLINIC | Age: 71
DRG: 252 | End: 2021-04-24
Attending: STUDENT IN AN ORGANIZED HEALTH CARE EDUCATION/TRAINING PROGRAM
Payer: COMMERCIAL

## 2021-04-24 LAB
BASE DEFICIT BLDA-SCNC: 2.5 MMOL/L
BLD PROD TYP BPU: NORMAL
BLD UNIT ID BPU: 0
BLOOD PRODUCT CODE: NORMAL
BPU ID: NORMAL
CA-I BLD-MCNC: 4.9 MG/DL (ref 4.4–5.2)
GLUCOSE BLD-MCNC: 99 MG/DL (ref 70–99)
GLUCOSE BLDC GLUCOMTR-MCNC: 81 MG/DL (ref 70–99)
GRAM STN SPEC: NORMAL
HCO3 BLD-SCNC: 21 MMOL/L (ref 21–28)
HGB BLD-MCNC: 7.8 G/DL (ref 11.7–15.7)
HGB BLD-MCNC: 8.5 G/DL (ref 11.7–15.7)
HGB BLD-MCNC: 9.1 G/DL (ref 11.7–15.7)
INTERPRETATION ECG - MUSE: NORMAL
LACTATE BLD-SCNC: 0.6 MMOL/L (ref 0.7–2)
O2/TOTAL GAS SETTING VFR VENT: 30 %
PCO2 BLD: 29 MM HG (ref 35–45)
PH BLD: 7.46 PH (ref 7.35–7.45)
PO2 BLD: 58 MM HG (ref 80–105)
POTASSIUM BLD-SCNC: 5.3 MMOL/L (ref 3.4–5.3)
SODIUM BLD-SCNC: 132 MMOL/L (ref 133–144)
SPECIMEN SOURCE: NORMAL
TRANSFUSION STATUS PATIENT QL: NORMAL
TRANSFUSION STATUS PATIENT QL: NORMAL

## 2021-04-24 PROCEDURE — 86900 BLOOD TYPING SEROLOGIC ABO: CPT | Performed by: EMERGENCY MEDICINE

## 2021-04-24 PROCEDURE — 93325 DOPPLER ECHO COLOR FLOW MAPG: CPT | Mod: 26 | Performed by: STUDENT IN AN ORGANIZED HEALTH CARE EDUCATION/TRAINING PROGRAM

## 2021-04-24 PROCEDURE — 255N000002 HC RX 255 OP 636: Performed by: STUDENT IN AN ORGANIZED HEALTH CARE EDUCATION/TRAINING PROGRAM

## 2021-04-24 PROCEDURE — 250N000011 HC RX IP 250 OP 636: Performed by: STUDENT IN AN ORGANIZED HEALTH CARE EDUCATION/TRAINING PROGRAM

## 2021-04-24 PROCEDURE — 87070 CULTURE OTHR SPECIMN AEROBIC: CPT | Performed by: SURGERY

## 2021-04-24 PROCEDURE — 85018 HEMOGLOBIN: CPT | Performed by: INTERNAL MEDICINE

## 2021-04-24 PROCEDURE — 120N000002 HC R&B MED SURG/OB UMMC

## 2021-04-24 PROCEDURE — 82947 ASSAY GLUCOSE BLOOD QUANT: CPT

## 2021-04-24 PROCEDURE — 250N000011 HC RX IP 250 OP 636: Performed by: SURGERY

## 2021-04-24 PROCEDURE — P9016 RBC LEUKOCYTES REDUCED: HCPCS | Performed by: EMERGENCY MEDICINE

## 2021-04-24 PROCEDURE — 87075 CULTR BACTERIA EXCEPT BLOOD: CPT | Performed by: SURGERY

## 2021-04-24 PROCEDURE — 85018 HEMOGLOBIN: CPT | Performed by: ANESTHESIOLOGY

## 2021-04-24 PROCEDURE — 82330 ASSAY OF CALCIUM: CPT

## 2021-04-24 PROCEDURE — 87205 SMEAR GRAM STAIN: CPT | Performed by: SURGERY

## 2021-04-24 PROCEDURE — 86901 BLOOD TYPING SEROLOGIC RH(D): CPT | Performed by: EMERGENCY MEDICINE

## 2021-04-24 PROCEDURE — 86922 COMPATIBILITY TEST ANTIGLOB: CPT | Performed by: EMERGENCY MEDICINE

## 2021-04-24 PROCEDURE — 258N000003 HC RX IP 258 OP 636: Performed by: NURSE ANESTHETIST, CERTIFIED REGISTERED

## 2021-04-24 PROCEDURE — 250N000009 HC RX 250: Performed by: SURGERY

## 2021-04-24 PROCEDURE — 250N000011 HC RX IP 250 OP 636: Performed by: EMERGENCY MEDICINE

## 2021-04-24 PROCEDURE — 250N000012 HC RX MED GY IP 250 OP 636 PS 637: Performed by: STUDENT IN AN ORGANIZED HEALTH CARE EDUCATION/TRAINING PROGRAM

## 2021-04-24 PROCEDURE — 360N000075 HC SURGERY LEVEL 2, PER MIN: Performed by: SURGERY

## 2021-04-24 PROCEDURE — 250N000011 HC RX IP 250 OP 636: Performed by: NURSE ANESTHETIST, CERTIFIED REGISTERED

## 2021-04-24 PROCEDURE — 87077 CULTURE AEROBIC IDENTIFY: CPT | Performed by: SURGERY

## 2021-04-24 PROCEDURE — 93308 TTE F-UP OR LMTD: CPT | Mod: 26 | Performed by: STUDENT IN AN ORGANIZED HEALTH CARE EDUCATION/TRAINING PROGRAM

## 2021-04-24 PROCEDURE — 258N000003 HC RX IP 258 OP 636: Performed by: STUDENT IN AN ORGANIZED HEALTH CARE EDUCATION/TRAINING PROGRAM

## 2021-04-24 PROCEDURE — 999N000141 HC STATISTIC PRE-PROCEDURE NURSING ASSESSMENT: Performed by: SURGERY

## 2021-04-24 PROCEDURE — 93321 DOPPLER ECHO F-UP/LMTD STD: CPT | Mod: 26 | Performed by: STUDENT IN AN ORGANIZED HEALTH CARE EDUCATION/TRAINING PROGRAM

## 2021-04-24 PROCEDURE — 84295 ASSAY OF SERUM SODIUM: CPT

## 2021-04-24 PROCEDURE — 258N000003 HC RX IP 258 OP 636: Performed by: EMERGENCY MEDICINE

## 2021-04-24 PROCEDURE — 0Y950ZZ DRAINAGE OF RIGHT INGUINAL REGION, OPEN APPROACH: ICD-10-PCS | Performed by: SURGERY

## 2021-04-24 PROCEDURE — 250N000009 HC RX 250: Performed by: NURSE ANESTHETIST, CERTIFIED REGISTERED

## 2021-04-24 PROCEDURE — 999N001017 HC STATISTIC GLUCOSE BY METER IP

## 2021-04-24 PROCEDURE — 86850 RBC ANTIBODY SCREEN: CPT | Performed by: EMERGENCY MEDICINE

## 2021-04-24 PROCEDURE — 250N000013 HC RX MED GY IP 250 OP 250 PS 637: Performed by: STUDENT IN AN ORGANIZED HEALTH CARE EDUCATION/TRAINING PROGRAM

## 2021-04-24 PROCEDURE — 87102 FUNGUS ISOLATION CULTURE: CPT | Performed by: SURGERY

## 2021-04-24 PROCEDURE — 82803 BLOOD GASES ANY COMBINATION: CPT

## 2021-04-24 PROCEDURE — 36415 COLL VENOUS BLD VENIPUNCTURE: CPT | Performed by: EMERGENCY MEDICINE

## 2021-04-24 PROCEDURE — 272N000001 HC OR GENERAL SUPPLY STERILE: Performed by: SURGERY

## 2021-04-24 PROCEDURE — 93321 DOPPLER ECHO F-UP/LMTD STD: CPT

## 2021-04-24 PROCEDURE — 93926 LOWER EXTREMITY STUDY: CPT

## 2021-04-24 PROCEDURE — 250N000013 HC RX MED GY IP 250 OP 250 PS 637: Performed by: SURGERY

## 2021-04-24 PROCEDURE — 83605 ASSAY OF LACTIC ACID: CPT

## 2021-04-24 PROCEDURE — 87186 SC STD MICRODIL/AGAR DIL: CPT | Performed by: SURGERY

## 2021-04-24 PROCEDURE — 93926 LOWER EXTREMITY STUDY: CPT | Mod: 26 | Performed by: RADIOLOGY

## 2021-04-24 PROCEDURE — 250N000025 HC SEVOFLURANE, PER MIN: Performed by: SURGERY

## 2021-04-24 PROCEDURE — 370N000017 HC ANESTHESIA TECHNICAL FEE, PER MIN: Performed by: SURGERY

## 2021-04-24 PROCEDURE — 99222 1ST HOSP IP/OBS MODERATE 55: CPT | Mod: 25 | Performed by: INTERNAL MEDICINE

## 2021-04-24 PROCEDURE — 710N000010 HC RECOVERY PHASE 1, LEVEL 2, PER MIN: Performed by: SURGERY

## 2021-04-24 PROCEDURE — 84132 ASSAY OF SERUM POTASSIUM: CPT

## 2021-04-24 RX ORDER — PIPERACILLIN SODIUM, TAZOBACTAM SODIUM 2; .25 G/10ML; G/10ML
2.25 INJECTION, POWDER, LYOPHILIZED, FOR SOLUTION INTRAVENOUS EVERY 6 HOURS
Status: DISCONTINUED | OUTPATIENT
Start: 2021-04-24 | End: 2021-04-27 | Stop reason: ALTCHOICE

## 2021-04-24 RX ORDER — ONDANSETRON 2 MG/ML
INJECTION INTRAMUSCULAR; INTRAVENOUS PRN
Status: DISCONTINUED | OUTPATIENT
Start: 2021-04-24 | End: 2021-04-24

## 2021-04-24 RX ORDER — LIDOCAINE HYDROCHLORIDE 20 MG/ML
INJECTION, SOLUTION INFILTRATION; PERINEURAL PRN
Status: DISCONTINUED | OUTPATIENT
Start: 2021-04-24 | End: 2021-04-24

## 2021-04-24 RX ORDER — SODIUM CHLORIDE, SODIUM LACTATE, POTASSIUM CHLORIDE, CALCIUM CHLORIDE 600; 310; 30; 20 MG/100ML; MG/100ML; MG/100ML; MG/100ML
INJECTION, SOLUTION INTRAVENOUS CONTINUOUS
Status: DISCONTINUED | OUTPATIENT
Start: 2021-04-24 | End: 2021-04-24 | Stop reason: HOSPADM

## 2021-04-24 RX ORDER — ONDANSETRON 2 MG/ML
4 INJECTION INTRAMUSCULAR; INTRAVENOUS EVERY 30 MIN PRN
Status: DISCONTINUED | OUTPATIENT
Start: 2021-04-24 | End: 2021-04-24 | Stop reason: HOSPADM

## 2021-04-24 RX ORDER — LIDOCAINE 40 MG/G
CREAM TOPICAL
Status: DISCONTINUED | OUTPATIENT
Start: 2021-04-24 | End: 2021-05-02 | Stop reason: HOSPADM

## 2021-04-24 RX ORDER — ACETAMINOPHEN 325 MG/1
650 TABLET ORAL EVERY 4 HOURS PRN
Status: DISCONTINUED | OUTPATIENT
Start: 2021-04-24 | End: 2021-05-02 | Stop reason: HOSPADM

## 2021-04-24 RX ORDER — SODIUM CHLORIDE, SODIUM LACTATE, POTASSIUM CHLORIDE, CALCIUM CHLORIDE 600; 310; 30; 20 MG/100ML; MG/100ML; MG/100ML; MG/100ML
INJECTION, SOLUTION INTRAVENOUS CONTINUOUS PRN
Status: DISCONTINUED | OUTPATIENT
Start: 2021-04-24 | End: 2021-04-24

## 2021-04-24 RX ORDER — ONDANSETRON 2 MG/ML
4 INJECTION INTRAMUSCULAR; INTRAVENOUS EVERY 6 HOURS PRN
Status: DISCONTINUED | OUTPATIENT
Start: 2021-04-24 | End: 2021-05-02 | Stop reason: HOSPADM

## 2021-04-24 RX ORDER — POLYETHYLENE GLYCOL 3350 17 G/17G
17 POWDER, FOR SOLUTION ORAL DAILY
Status: DISCONTINUED | OUTPATIENT
Start: 2021-04-24 | End: 2021-04-26

## 2021-04-24 RX ORDER — LANOLIN ALCOHOL/MO/W.PET/CERES
3 CREAM (GRAM) TOPICAL
Status: DISCONTINUED | OUTPATIENT
Start: 2021-04-24 | End: 2021-04-26

## 2021-04-24 RX ORDER — MEPERIDINE HYDROCHLORIDE 25 MG/ML
12.5 INJECTION INTRAMUSCULAR; INTRAVENOUS; SUBCUTANEOUS EVERY 5 MIN PRN
Status: DISCONTINUED | OUTPATIENT
Start: 2021-04-24 | End: 2021-04-24 | Stop reason: HOSPADM

## 2021-04-24 RX ORDER — ONDANSETRON 4 MG/1
4 TABLET, ORALLY DISINTEGRATING ORAL EVERY 30 MIN PRN
Status: DISCONTINUED | OUTPATIENT
Start: 2021-04-24 | End: 2021-04-24 | Stop reason: HOSPADM

## 2021-04-24 RX ORDER — AMOXICILLIN 250 MG
2 CAPSULE ORAL 2 TIMES DAILY PRN
Status: DISCONTINUED | OUTPATIENT
Start: 2021-04-24 | End: 2021-04-26

## 2021-04-24 RX ORDER — FENTANYL CITRATE 50 UG/ML
INJECTION, SOLUTION INTRAMUSCULAR; INTRAVENOUS PRN
Status: DISCONTINUED | OUTPATIENT
Start: 2021-04-24 | End: 2021-04-24

## 2021-04-24 RX ORDER — PROPOFOL 10 MG/ML
INJECTION, EMULSION INTRAVENOUS PRN
Status: DISCONTINUED | OUTPATIENT
Start: 2021-04-24 | End: 2021-04-24

## 2021-04-24 RX ORDER — OXYCODONE HYDROCHLORIDE 5 MG/1
5 TABLET ORAL EVERY 4 HOURS PRN
Status: DISCONTINUED | OUTPATIENT
Start: 2021-04-24 | End: 2021-05-02 | Stop reason: HOSPADM

## 2021-04-24 RX ORDER — ONDANSETRON 4 MG/1
4 TABLET, ORALLY DISINTEGRATING ORAL EVERY 6 HOURS PRN
Status: DISCONTINUED | OUTPATIENT
Start: 2021-04-24 | End: 2021-05-02 | Stop reason: HOSPADM

## 2021-04-24 RX ORDER — AMOXICILLIN 250 MG
1 CAPSULE ORAL 2 TIMES DAILY PRN
Status: DISCONTINUED | OUTPATIENT
Start: 2021-04-24 | End: 2021-04-26

## 2021-04-24 RX ORDER — HYDRALAZINE HYDROCHLORIDE 20 MG/ML
2.5-5 INJECTION INTRAMUSCULAR; INTRAVENOUS EVERY 10 MIN PRN
Status: DISCONTINUED | OUTPATIENT
Start: 2021-04-24 | End: 2021-04-24 | Stop reason: HOSPADM

## 2021-04-24 RX ORDER — SODIUM CHLORIDE, SODIUM LACTATE, POTASSIUM CHLORIDE, CALCIUM CHLORIDE 600; 310; 30; 20 MG/100ML; MG/100ML; MG/100ML; MG/100ML
INJECTION, SOLUTION INTRAVENOUS CONTINUOUS
Status: DISCONTINUED | OUTPATIENT
Start: 2021-04-24 | End: 2021-04-25 | Stop reason: DRUGHIGH

## 2021-04-24 RX ORDER — FENTANYL CITRATE 50 UG/ML
25-50 INJECTION, SOLUTION INTRAMUSCULAR; INTRAVENOUS
Status: DISCONTINUED | OUTPATIENT
Start: 2021-04-24 | End: 2021-04-24 | Stop reason: HOSPADM

## 2021-04-24 RX ORDER — HEPARIN SODIUM 5000 [USP'U]/.5ML
5000 INJECTION, SOLUTION INTRAVENOUS; SUBCUTANEOUS EVERY 12 HOURS
Status: DISCONTINUED | OUTPATIENT
Start: 2021-04-24 | End: 2021-04-25 | Stop reason: DRUGHIGH

## 2021-04-24 RX ORDER — LABETALOL HYDROCHLORIDE 5 MG/ML
10 INJECTION, SOLUTION INTRAVENOUS
Status: DISCONTINUED | OUTPATIENT
Start: 2021-04-24 | End: 2021-04-24 | Stop reason: HOSPADM

## 2021-04-24 RX ORDER — DEXAMETHASONE SODIUM PHOSPHATE 4 MG/ML
INJECTION, SOLUTION INTRA-ARTICULAR; INTRALESIONAL; INTRAMUSCULAR; INTRAVENOUS; SOFT TISSUE PRN
Status: DISCONTINUED | OUTPATIENT
Start: 2021-04-24 | End: 2021-04-24

## 2021-04-24 RX ADMIN — FENTANYL CITRATE 25 MCG: 50 INJECTION, SOLUTION INTRAMUSCULAR; INTRAVENOUS at 13:52

## 2021-04-24 RX ADMIN — LISINOPRIL 5 MG: 5 TABLET ORAL at 07:59

## 2021-04-24 RX ADMIN — POLYETHYLENE GLYCOL 3350 17 G: 17 POWDER, FOR SOLUTION ORAL at 07:57

## 2021-04-24 RX ADMIN — PROPOFOL 20 MG: 10 INJECTION, EMULSION INTRAVENOUS at 13:20

## 2021-04-24 RX ADMIN — MYCOPHENOLIC ACID 360 MG: 360 TABLET, DELAYED RELEASE ORAL at 20:17

## 2021-04-24 RX ADMIN — LIDOCAINE HYDROCHLORIDE 100 MG: 20 INJECTION, SOLUTION INFILTRATION; PERINEURAL at 13:19

## 2021-04-24 RX ADMIN — ACETAMINOPHEN 650 MG: 325 TABLET, FILM COATED ORAL at 00:46

## 2021-04-24 RX ADMIN — HEPARIN SODIUM 5000 UNITS: 5000 INJECTION, SOLUTION INTRAVENOUS; SUBCUTANEOUS at 20:18

## 2021-04-24 RX ADMIN — PIPERACILLIN AND TAZOBACTAM 2.25 G: 2; .25 INJECTION, POWDER, FOR SOLUTION INTRAVENOUS at 13:40

## 2021-04-24 RX ADMIN — ACETAMINOPHEN 650 MG: 325 TABLET, FILM COATED ORAL at 04:28

## 2021-04-24 RX ADMIN — SODIUM CHLORIDE, POTASSIUM CHLORIDE, SODIUM LACTATE AND CALCIUM CHLORIDE: 600; 310; 30; 20 INJECTION, SOLUTION INTRAVENOUS at 03:38

## 2021-04-24 RX ADMIN — SODIUM CHLORIDE, POTASSIUM CHLORIDE, SODIUM LACTATE AND CALCIUM CHLORIDE: 600; 310; 30; 20 INJECTION, SOLUTION INTRAVENOUS at 20:00

## 2021-04-24 RX ADMIN — PIPERACILLIN AND TAZOBACTAM 2.25 G: 2; .25 INJECTION, POWDER, FOR SOLUTION INTRAVENOUS at 02:05

## 2021-04-24 RX ADMIN — PREDNISONE 5 MG: 5 TABLET ORAL at 07:58

## 2021-04-24 RX ADMIN — PROPOFOL 50 MG: 10 INJECTION, EMULSION INTRAVENOUS at 13:19

## 2021-04-24 RX ADMIN — PHENYLEPHRINE HYDROCHLORIDE 100 MCG: 10 INJECTION INTRAVENOUS at 13:29

## 2021-04-24 RX ADMIN — OXYCODONE HYDROCHLORIDE 5 MG: 5 TABLET ORAL at 00:46

## 2021-04-24 RX ADMIN — METOPROLOL TARTRATE 100 MG: 50 TABLET, FILM COATED ORAL at 20:17

## 2021-04-24 RX ADMIN — VANCOMYCIN HYDROCHLORIDE 750 MG: 10 INJECTION, POWDER, LYOPHILIZED, FOR SOLUTION INTRAVENOUS at 21:06

## 2021-04-24 RX ADMIN — PHENYLEPHRINE HYDROCHLORIDE 0.2 MCG/KG/MIN: 10 INJECTION INTRAVENOUS at 13:35

## 2021-04-24 RX ADMIN — OXYCODONE HYDROCHLORIDE 5 MG: 5 TABLET ORAL at 04:28

## 2021-04-24 RX ADMIN — ASPIRIN 81 MG CHEWABLE TABLET 81 MG: 81 TABLET CHEWABLE at 07:59

## 2021-04-24 RX ADMIN — FENTANYL CITRATE 25 MCG: 50 INJECTION, SOLUTION INTRAMUSCULAR; INTRAVENOUS at 13:47

## 2021-04-24 RX ADMIN — PHENYLEPHRINE HYDROCHLORIDE 100 MCG: 10 INJECTION INTRAVENOUS at 13:05

## 2021-04-24 RX ADMIN — ONDANSETRON 4 MG: 2 INJECTION INTRAMUSCULAR; INTRAVENOUS at 12:38

## 2021-04-24 RX ADMIN — PHENYLEPHRINE HYDROCHLORIDE 100 MCG: 10 INJECTION INTRAVENOUS at 13:22

## 2021-04-24 RX ADMIN — HUMAN ALBUMIN MICROSPHERES AND PERFLUTREN 5 ML: 10; .22 INJECTION, SOLUTION INTRAVENOUS at 10:03

## 2021-04-24 RX ADMIN — MIDAZOLAM 0.5 MG: 1 INJECTION INTRAMUSCULAR; INTRAVENOUS at 12:51

## 2021-04-24 RX ADMIN — PIPERACILLIN AND TAZOBACTAM 2.25 G: 2; .25 INJECTION, POWDER, FOR SOLUTION INTRAVENOUS at 07:59

## 2021-04-24 RX ADMIN — MYCOPHENOLIC ACID 360 MG: 360 TABLET, DELAYED RELEASE ORAL at 07:59

## 2021-04-24 RX ADMIN — PIPERACILLIN AND TAZOBACTAM 2.25 G: 2; .25 INJECTION, POWDER, FOR SOLUTION INTRAVENOUS at 20:18

## 2021-04-24 RX ADMIN — TICAGRELOR 90 MG: 90 TABLET ORAL at 20:16

## 2021-04-24 RX ADMIN — ATORVASTATIN CALCIUM 80 MG: 40 TABLET, FILM COATED ORAL at 07:59

## 2021-04-24 RX ADMIN — PHENYLEPHRINE HYDROCHLORIDE 100 MCG: 10 INJECTION INTRAVENOUS at 13:44

## 2021-04-24 RX ADMIN — DEXAMETHASONE SODIUM PHOSPHATE 4 MG: 4 INJECTION, SOLUTION INTRA-ARTICULAR; INTRALESIONAL; INTRAMUSCULAR; INTRAVENOUS; SOFT TISSUE at 13:55

## 2021-04-24 RX ADMIN — SODIUM CHLORIDE, POTASSIUM CHLORIDE, SODIUM LACTATE AND CALCIUM CHLORIDE: 600; 310; 30; 20 INJECTION, SOLUTION INTRAVENOUS at 12:38

## 2021-04-24 RX ADMIN — PHENYLEPHRINE HYDROCHLORIDE 100 MCG: 10 INJECTION INTRAVENOUS at 13:33

## 2021-04-24 RX ADMIN — METOPROLOL TARTRATE 100 MG: 50 TABLET, FILM COATED ORAL at 07:58

## 2021-04-24 RX ADMIN — MIDAZOLAM 1 MG: 1 INJECTION INTRAMUSCULAR; INTRAVENOUS at 12:38

## 2021-04-24 RX ADMIN — HEPARIN SODIUM 5000 UNITS: 5000 INJECTION, SOLUTION INTRAVENOUS; SUBCUTANEOUS at 08:00

## 2021-04-24 RX ADMIN — MIDAZOLAM 0.5 MG: 1 INJECTION INTRAMUSCULAR; INTRAVENOUS at 13:43

## 2021-04-24 ASSESSMENT — ACTIVITIES OF DAILY LIVING (ADL)
WHICH_OF_THE_ABOVE_FUNCTIONAL_RISKS_HAD_A_RECENT_ONSET_OR_CHANGE?: AMBULATION
FALL_HISTORY_WITHIN_LAST_SIX_MONTHS: NO
DOING_ERRANDS_INDEPENDENTLY_DIFFICULTY: YES
WALKING_OR_CLIMBING_STAIRS_DIFFICULTY: YES
WEAR_GLASSES_OR_BLIND: NO
DRESSING/BATHING_DIFFICULTY: NO
EQUIPMENT_CURRENTLY_USED_AT_HOME: WALKER, STANDARD
DIFFICULTY_COMMUNICATING: NO
HEARING_DIFFICULTY_OR_DEAF: NO
TOILETING_ISSUES: NO
CONCENTRATING,_REMEMBERING_OR_MAKING_DECISIONS_DIFFICULTY: NO
DIFFICULTY_EATING/SWALLOWING: NO
WALKING_OR_CLIMBING_STAIRS: AMBULATION DIFFICULTY, ASSISTANCE 1 PERSON

## 2021-04-24 ASSESSMENT — LIFESTYLE VARIABLES: TOBACCO_USE: 1

## 2021-04-24 NOTE — ED PROVIDER NOTES
[place] EMERGENCY DEPARTMENT ([facility])  4/23/21     History     Chief Complaint   Patient presents with     Post-op Problem     The history is provided by the patient, medical records and the EMS personnel.     Maribel Yang is a 70 year old female with past medical history significant for left renal transplant, lung cancer, HTN who presents for further evaluation of redness and warmth near her right femoral groin access site.  She underwent endovascular repair of AAA and occluded right external iliac artery on 4/6/2020.  Patient reports she was doing well for about a week following her procedure.  She states she started developing pain and redness near her right groin where her procedure was.  Patient was seen by a home health nurse who felt that it did not look infected but did appear macerated and inflamed.  She was instructed to ice and place dry gauze over the area.  She had increased pain in the area and submitted photos via [facility] for her care team.  She was prescribed Keflex 500 mg 3 times daily yesterday.  She reports she has continued to have more pain and swelling in her right groin that is not being touched by oxycodone anymore.  Patient reports she woke up from a nap today and noticed the area in her right groin was purulent and blistering.  She now presents via EMS for evaluation.  Paramedics note that she was febrile to 103.6  F on arrival.  Patient endorses chills.  She denies nausea or vomiting.  No change to bowel or bladder.  She denies headache or lower extremity edema.    Past Medical History  Past Medical History:   Diagnosis Date     Abnormal coagulation profile     p [id number] heterozygote      Age-related osteoporosis without current pathological fracture 6/22/2019     Anemia      Antiplatelet or antithrombotic long-term use      ASCUS with positive high risk HPV 2007, 2015    + HPV 56, 54,& 6, colp - TAL III, Leep =TAL II     Basal cell carcinoma      Depressive  disorder July 2015     Hypertension      Immunosuppressed status (H)     due meds     Kidney replaced by transplant 9/04    Living donor recipient,  Rejection 7/2005     LSIL (low grade squamous intraepithelial lesion) on Pap smear 4/2013    +HPV 33 or 45, 61       PAD (peripheral artery disease) (H)      PONV (postoperative nausea and vomiting)      Squamous cell lung cancer (H)      Thrombosis of leg 1967     Unspecified disorder of kidney and ureter     X-linked dominant Alport's syndrome.     Past Surgical History:   Procedure Laterality Date     BIOPSY      Kidney, Lung, Breast     BIOPSY ANAL N/A 3/14/2018    Procedure: BIOPSY ANAL;;  Surgeon: Shabbir Leo MD;  Location:  OR      TRANSPLANTATION OF KIDNEY  9/04    recipient -- done at [facility]     COLONOSCOPY       COLONOSCOPY N/A 8/9/2017    Procedure: COMBINED COLONOSCOPY, SINGLE OR MULTIPLE BIOPSY/POLYPECTOMY BY BIOPSY;;  Surgeon: Sushil Hyatt MD;  Location:  GI     COLPOSCOPY,LOOP ELECTRD CERVIX EXCIS  03/11/08    TAL II     CONIZATION LEEP  7/17/2013    Procedure: CONIZATION LEEP;;  Surgeon: Liliana Renteria MD;  Location:  OR     CONIZATION LEEP N/A 8/17/2016    Procedure: CONIZATION LEEP;  Surgeon: Liliana Renteria MD;  Location:  OR     CV CORONARY ANGIOGRAM N/A 4/6/2021    Procedure: CV CORONARY ANGIOGRAM;  Surgeon: Sincere Rosas MD;  Location:  HEART CARDIAC CATH LAB     CV PCI STENT DRUG ELUTING N/A 4/6/2021    Procedure: Percutaneous Coronary Intervention Stent Drug Eluting;  Surgeon: Sincere Rosas MD;  Location:  HEART CARDIAC CATH LAB     ENDOVASCULAR REPAIR ANEURYSM AORTOILIAC Bilateral 4/6/2021    Procedure: Endovascular Abdominal Aortic Aneurysm Repair with Aortouniiliac Device and Femoral-Femoral Artery Bypass with [id number] Artegraft;  Surgeon: Abena Ferrara MD;  Location:  OR     EXAM UNDER ANESTHESIA ANUS  7/15/2014    Procedure: EXAM UNDER ANESTHESIA ANUS;  Surgeon:  Radha Musa MD;  Location: [facility] OR     EXAM UNDER ANESTHESIA ANUS N/A 3/14/2018    Procedure: EXAM UNDER ANESTHESIA ANUS;  Anal Exam Under Anesthesia With Excision of anal lesion, proctoscopy;  Surgeon: Shabbir Leo MD;  Location: [facility] OR     EYE SURGERY       GENITOURINARY SURGERY       IR OR ANGIOGRAM  4/6/2021     LASER CO2 EXCISE VULVA WIDE LOCAL  7/15/2014    Procedure: LASER CO2 EXCISE VULVA WIDE LOCAL;  Surgeon: Liliana Renteria MD;  Location: [facility] OR     LASER CO2 VAGINA  7/17/2013    Procedure: LASER CO2 VAGINA;;  Surgeon: Liliana Renteria MD;  Location: [facility] OR     LASER CO2 VAGINA N/A 9/25/2018    Procedure: LASER CO2 VAGINA;  Exam Under Anesthesia, CO2 Laser Ablation of Upper Vagina and Cervix;  Surgeon: Pati Garcia MD;  Location: [facility] OR     MICROSCOPY ANAL  7/17/2013    Procedure: MICROSCOPY ANAL;  Anal Microscopy,  EUA vagina,Colposcopy Of Vagina And Vulva, Vaginal Biopsies, Omniguide Co2 Laser To Vagina and vulva, Loop Electrosurgical Excision Procedure To Cervix;  Surgeon: Radha Musa MD;  Location: [facility] OR     MICROSCOPY ANAL  7/15/2014    Procedure: MICROSCOPY ANAL;  Surgeon: Radha Musa MD;  Location: [facility] OR     VASCULAR SURGERY      Thrombectomy     ZZC NONSPECIFIC PROCEDURE      Thrombectomy     ZZC NONSPECIFIC PROCEDURE  1955 and 1959    Bilater eye surgery - correction for crossed eyes     ZZC NONSPECIFIC PROCEDURE  1998    oopherectomy L     ZZC NONSPECIFIC PROCEDURE  1967    open kidney biopsy - L     acetaminophen (TYLENOL) 325 MG tablet  amoxicillin (AMOXIL) 500 MG capsule  aspirin (ASA) 81 MG chewable tablet  atorvastatin (LIPITOR) 80 MG tablet  calcium carbonate 600 mg-vitamin D 400 units (CALTRATE) 600-400 MG-UNIT per tablet  Lactobacillus-Inulin ([facility] D[facility]) CAPS  lidocaine (LIDODERM) 5 % patch  lisinopril (ZESTRIL) 5 MG tablet  metoprolol tartrate (LOPRESSOR) 100 MG tablet  mycophenolic acid (GENERIC EQUIVALENT) 360 MG  EC tablet  oxyCODONE (ROXICODONE) 5 MG tablet  predniSONE (DELTASONE) 5 MG tablet  ticagrelor (BRILINTA) 90 MG tablet      Allergies   Allergen Reactions     Blood Transfusion Related (Informational Only) Other (See Comments)     Patient has a history of a clinically significant antibody against RBC antigens.  A delay in compatible RBCs may occur.     Ultracet Nausea and Vomiting and Hives     Hydrocodone Nausea and Vomiting and Hives     Family History  Family History   Problem Relation Age of Onset     Diabetes Father         type 2 diag age,60's     Alcohol/Drug Father      Arthritis Father      Hypertension Father      Lipids Father         high cholesterol     Arthritis Mother      Diabetes Mother      Depression Mother      Heart Disease Mother      Neurologic Disorder Mother      Obesity Mother      Psychotic Disorder Mother      Thyroid Disease Mother      Hypertension Mother      Gynecology Sister         Precancerous cell removal from cervix at age 45     Depression Sister      Allergies Sister      Alcohol/Drug Sister      Neurologic Disorder Sister      Cerebrovascular Disease Paternal Grandmother          of a stroke in her 80's     Diabetes Paternal Grandmother      Alcohol/Drug Son      Colon Polyps Sister      Breast Cancer Niece      Other Cancer Sister         Cervical     Obesity Sister      Depression Sister      Substance Abuse Son      Substance Abuse Sister      Asthma Other      Colon Cancer No family hx of      Crohn's Disease No family hx of      Ulcerative Colitis No family hx of      Melanoma No family hx of      Skin Cancer No family hx of      Social History   Social History     Tobacco Use     Smoking status: Former Smoker     Packs/day: 0.30     Years: 35.00     Pack years: 10.50     Types: Cigarettes     Start date: 1967     Smokeless tobacco: Never Used     Tobacco comment: Couple times a week. 1-2 cigs 1-2x a week.   Substance Use Topics     Alcohol use: Not Currently      "Alcohol/week: 0.0 standard drinks     Frequency: Monthly or less     Drinks per session: 1 or 2     Binge frequency: Less than monthly     Comment: rarely     Drug use: Not Currently     Types: Marijuana      Past medical history, past surgical history, medications, allergies, family history, and social history were reviewed with the patient. No additional pertinent items.       Review of Systems   Constitutional: Positive for chills and fever.   Cardiovascular: Negative for leg swelling.   Gastrointestinal: Negative for constipation, diarrhea, nausea and vomiting.   Musculoskeletal:        Pos for pain at R groin/inguinal area   Skin: Positive for color change (erythema at R inguinal area).   Neurological: Negative for headaches.   All other systems reviewed and are negative.    A complete review of systems was performed with pertinent positives and negatives noted in the HPI, and all other systems negative.    Physical Exam   BP: (!) 160/89  Pulse: 91  Temp: 99.6  F (37.6  C)  Resp: 18  Height: 157.5 cm (5' 2\")  Weight: 46.2 kg (101 lb 14.4 oz)  SpO2: 98 %  Physical Exam  Vitals signs and nursing note reviewed.   Constitutional:       General: She is not in acute distress.     Appearance: Normal appearance. She is ill-appearing. She is not toxic-appearing.   HENT:      Head: Normocephalic and atraumatic.      Nose: Nose normal.      Mouth/Throat:      Mouth: Mucous membranes are moist.   Eyes:      Pupils: Pupils are equal, round, and reactive to light.   Neck:      Musculoskeletal: Normal range of motion. No neck rigidity.   Cardiovascular:      Rate and Rhythm: Normal rate.      Pulses: Normal pulses.      Heart sounds: Normal heart sounds.   Pulmonary:      Effort: Pulmonary effort is normal. No respiratory distress.      Breath sounds: Normal breath sounds.   Abdominal:      General: Abdomen is flat. There is no distension.   Musculoskeletal: Normal range of motion.         General: No swelling or deformity. "        Legs:    Skin:     General: Skin is warm.      Capillary Refill: Capillary refill takes less than 2 seconds.   Neurological:      Mental Status: She is alert and oriented to person, place, and time.   Psychiatric:         Mood and Affect: Mood normal.                 ED Course      Procedures        The medical record was reviewed and interpreted.  Current labs reviewed and interpreted.  Previous labs reviewed and interpreted.  Current images reviewed and interpreted: see below.       Results for orders placed or performed during the hospital encounter of 04/23/21   CT Abdomen Pelvis w/o Contrast     Status: None    Narrative    EXAMINATION: CT ABDOMEN PELVIS W/O CONTRAST, 4/23/2021 9:24 PM    TECHNIQUE:  Helical CT images from the lung bases through the  symphysis pubis were obtained without IV contrast. Contrast dose: None    COMPARISON: Chest abdomen and pelvis CTA 3/22/2021    HISTORY: Abdominal abscess/infection suspected. Status post EVAR and  aorto uniliac graft 4/6/21    FINDINGS:    Abdomen and pelvis: Postprocedural changes of abdominal aorta EVAR  with left uni-iliac graft. The distal limb of the graft is positioned  in the left common femoral artery. The proximal end of the graft is  located above the native renal arteries The abdominal aortic aneurysm  sac measures 6.2 x 5.8 cm on image 185 of series 5. There is mild  stranding surrounding the aneurysm sac consistent with postsurgical  changes. Vascular patency not evaluated on this noncontrast study.     Unremarkable noncontrast appearance of the liver, gallbladder,  pancreas, and spleen. Nondilated common bile duct. Left adrenal  calcification. Bilateral atrophic native kidneys. 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  A small hemorrhagic/proteinaceous cyst in the mid transplant, series 3  image 49 suspected measuring 7 mm.    No small bowel obstruction. Scattered stool throughout the  colon.  Appendicoliths within nondilated appendix demonstrate. No adjacent  inflammation.    The bladder and uterus are within normal limits.    Lung bases/lower chest:  There is a pericardial effusion measuring up  to 1.5 cm in thickness on image 35 of series 5. Aneurysmal lower  thoracic aorta measuring up to 4.6 cm. Atherosclerosis. The lung bases  are relatively clear. Emphysematous changes. No pleural effusion.  There may be a small [place] fat-containing hernia along the right  hemidiaphragm.    Bones and soft tissues: There is an anterior abdominal wall fluid  collection with thick walls loculations measuring 7.9 x 3.4 cm and 6.0  x 2.1 cm on image 327 of series 5, extending from the right groin  surgical site right common femoral artery along the femorofemoral  bypass graft to the left common femoral artery. There is surrounding  inflammatory stranding of the lower anterior abdominal wall and right  groin. No acute or aggressive appearing bone lesion. Body wall edema.      Impression    IMPRESSION:   1. Large thick-walled fluid collection in the lower abdominal wall  extending from the right groin surgical site and right common femoral  artery along the femoral-femoral bypass graft to the left common  femoral artery. Surgical consultation is recommended.    2. Postprocedural changes of abdominal aortic EVAR with left uni-iliac  graft.     I have personally reviewed the examination and initial interpretation  and I agree with the findings.    ELISSA RIVAS MD   Comprehensive metabolic panel     Status: Abnormal   Result Value Ref Range    Sodium 132 (L) 133 - 144 mmol/L    Potassium 4.4 3.4 - 5.3 mmol/L    Chloride 102 94 - 109 mmol/L    Carbon Dioxide 21 20 - 32 mmol/L    Anion Gap 9 3 - 14 mmol/L    Glucose 112 (H) 70 - 99 mg/dL    Urea Nitrogen 36 (H) 7 - 30 mg/dL    Creatinine 1.63 (H) 0.52 - 1.04 mg/dL    GFR Estimate 32 (L) >60 mL/min/[1.73_m2]    GFR Estimate If Black 37 (L) >60 mL/min/[1.73_m2]     Calcium 9.2 8.5 - 10.1 mg/dL    Bilirubin Total 0.5 0.2 - 1.3 mg/dL    Albumin 2.0 (L) 3.4 - 5.0 g/dL    Protein Total 6.1 (L) 6.8 - 8.8 g/dL    Alkaline Phosphatase 169 (H) 40 - 150 U/L    ALT 15 0 - 50 U/L    AST 16 0 - 45 U/L   CBC with platelets differential     Status: Abnormal   Result Value Ref Range    WBC 13.4 (H) 4.0 - 11.0 10e9/L    RBC Count 3.21 (L) 3.8 - 5.2 10e12/L    Hemoglobin 9.1 (L) 11.7 - 15.7 g/dL    Hematocrit 29.5 (L) 35.0 - 47.0 %    MCV 92 78 - 100 fl    MCH 28.3 26.5 - 33.0 pg    MCHC 30.8 (L) 31.5 - 36.5 g/dL    RDW 15.8 (H) 10.0 - 15.0 %    Platelet Count 529 (H) 150 - 450 10e9/L    Diff Method Automated Method     % Neutrophils 88.5 %    % Lymphocytes 1.5 %    % Monocytes 9.0 %    % Eosinophils 0.2 %    % Basophils 0.2 %    % Immature Granulocytes 0.6 %    Nucleated RBCs 0 0 /100    Absolute Neutrophil 11.8 (H) 1.6 - 8.3 10e9/L    Absolute Lymphocytes 0.2 (L) 0.8 - 5.3 10e9/L    Absolute Monocytes 1.2 0.0 - 1.3 10e9/L    Absolute Eosinophils 0.0 0.0 - 0.7 10e9/L    Absolute Basophils 0.0 0.0 - 0.2 10e9/L    Abs Immature Granulocytes 0.1 0 - 0.4 10e9/L    Absolute Nucleated RBC 0.0    Lactic acid whole blood     Status: None   Result Value Ref Range    Lactic Acid 1.2 0.7 - 2.0 mmol/L   Symptomatic Influenza A/B & SARS-CoV2 (COVID-19) Virus PCR Multiplex     Status: None    Specimen: Nasopharyngeal   Result Value Ref Range    Flu A/B & SARS-COV-2 PCR Source Nasopharyngeal     SARS-CoV-2 PCR Result NEGATIVE     Influenza A PCR Negative NEG^Negative    Influenza B PCR Negative NEG^Negative    Respiratory Syncytial Virus PCR Negative NEG^Negative    Flu A/B & SARS-CoV-2 PCR Comment (Note)    Troponin I     Status: None   Result Value Ref Range    Troponin I ES <0.015 0.000 - 0.045 ug/L   EKG 12 lead     Status: None (Preliminary result)   Result Value Ref Range    Interpretation ECG Click View Image link to view waveform and result      Medications   HYDROmorphone (PF) (DILAUDID)  injection 0.5 mg (0.5 mg Intravenous Given 4/23/21 2027)   vancomycin (VANCOCIN) 750 mg in sodium chloride 0.9 % 250 mL intermittent infusion (has no administration in time range)   lidocaine 1 % 0.1-1 mL (has no administration in time range)   lidocaine (LMX4) cream (has no administration in time range)   sodium chloride (PF) 0.9% PF flush 3 mL (has no administration in time range)   sodium chloride (PF) 0.9% PF flush 3 mL (has no administration in time range)   melatonin tablet 3 mg (has no administration in time range)   heparin ANTICOAGULANT injection 5,000 Units (has no administration in time range)   lactated ringers infusion (has no administration in time range)   piperacillin-tazobactam (ZOSYN) 2.25 g vial to attach to  ml bag (has no administration in time range)   acetaminophen (TYLENOL) tablet 650 mg (650 mg Oral Given 4/24/21 0046)   polyethylene glycol (MIRALAX) Packet 17 g (has no administration in time range)   senna-docusate (SENOKOT-S/PERICOLACE) 8.6-50 MG per tablet 1 tablet (has no administration in time range)     Or   senna-docusate (SENOKOT-S/PERICOLACE) 8.6-50 MG per tablet 2 tablet (has no administration in time range)   ondansetron (ZOFRAN-ODT) ODT tab 4 mg (has no administration in time range)     Or   ondansetron (ZOFRAN) injection 4 mg (has no administration in time range)   aspirin (ASA) chewable tablet 81 mg (has no administration in time range)   atorvastatin (LIPITOR) tablet 80 mg (has no administration in time range)   lisinopril (ZESTRIL) tablet 5 mg (has no administration in time range)   metoprolol tartrate (LOPRESSOR) tablet 100 mg (100 mg Oral Given 4/23/21 2306)   mycophenolic acid (GENERIC EQUIVALENT) EC tablet 360 mg (360 mg Oral Given 4/23/21 2306)   predniSONE (DELTASONE) tablet 5 mg (has no administration in time range)   ticagrelor (BRILINTA) tablet 90 mg (90 mg Oral Given 4/23/21 2306)   oxyCODONE (ROXICODONE) tablet 5 mg (5 mg Oral Given 4/24/21 0046)    cefTRIAXone (ROCEPHIN) 2 g vial to attach to  ml bag for ADULTS or NS 50 ml bag for PEDS (0 g Intravenous Stopped 4/23/21 2121)   vancomycin (VANCOCIN) 750 mg in sodium chloride 0.9 % 250 mL intermittent infusion (0 mg Intravenous Stopped 4/23/21 2242)        Assessments & Plan (with Medical Decision Making)   Patient with history of recent endovascular pair of AAA and occluded right external iliac artery on 4/6/2020, presents the ED with increased pain to the surgical site, redness, swelling, purulent drainage.    Differential diagnosis includes subcutaneous abscess, cellulitis, bacteremia, fistula, pseudoaneurysm    On arrival, patient has temperature of 99.6.  Mildly tachycardic at 93.  Mildly hypertensive at 160/89.  On exam, she has diffuse tenderness of the right inguinal area.  There is some bubbling of the skin and purulent drainage from the incision.  Mild amount of fluctuance with surrounding induration to the area.  Erythema of the skin.  Physical exam consistent with surgical site infection    Plan for CBC, CMP, blood cultures x2, lactic acid, CT abdomen pelvis, vascular surgery consult    Patient has history of transplanted kidney and has creatinine of 1.63 with GFR of 32 (baseline).  We will switch CT to noncontrast.    Labs show a leukocytosis of 13.4 with a left neutrophil shift.  Lactic acid 1.2.  Creatinine 1.63, consistent with baseline.    CT scan shows walled off fluid collection in the area of pain, concerning for abscess/infection.  Patient was started on Vancomycin and Rocephin.  Blood cultures ordered.  Patient was evaluated by vascular surgery who will admit to their service.  They requested an EKG and troponin for preop reasons and will follow up on the results of those tests.  No additional recommendations given at this time.      I have reviewed the nursing notes. I have reviewed the findings, diagnosis, plan and need for follow up with the patient.    New Prescriptions    No  medications on file       Final diagnoses:   Hematoma   I, Binta Recinos, am serving as a trained medical scribe to document services personally performed by Sven Lagos DO, based on the provider's statements to me.     Sven VARGHESE DO, was physically present and have reviewed and verified the accuracy of this note documented by Binta Recinos.     --  Sven Lagos DO  [facility] EMERGENCY DEPARTMENT  4/23/2021     Sven Lagos DO  04/24/21 0109

## 2021-04-24 NOTE — ED TRIAGE NOTES
Patient BIBA from home with complaints of swelling and redness of the right inguinal incision. Patient had AAA repair on 4/6. Patient started on PO Cepha;exin 4/22 by home care RN. EMS reports temp of 103.6 on arrival. Temp of 99.6 on arrival to the ED.

## 2021-04-24 NOTE — PHARMACY-VANCOMYCIN DOSING SERVICE
Pharmacy Vancomycin Initial Note  Date of Service 2021  Patient's  1950  70 year old, female    Indication: Skin and Soft Tissue Infection    Current estimated CrCl = Estimated Creatinine Clearance: 24.1 mL/min (A) (based on SCr of 1.63 mg/dL (H)).    Creatinine for last 3 days  2021:  7:35 PM Creatinine 1.63 mg/dL    Recent Vancomycin Level(s) for last 3 days  No results found for requested labs within last 72 hours.      Vancomycin IV Administrations (past 72 hours)      No vancomycin orders with administrations in past 72 hours.                Nephrotoxins and other renal medications (From now, onward)    Start     Dose/Rate Route Frequency Ordered Stop    21  vancomycin (VANCOCIN) 750 mg in sodium chloride 0.9 % 250 mL intermittent infusion      750 mg  over 90 Minutes Intravenous EVERY 24 HOURS 21  vancomycin (VANCOCIN) 750 mg in sodium chloride 0.9 % 250 mL intermittent infusion      750 mg  over 90 Minutes Intravenous ONCE 21            Contrast Orders - past 72 hours (72h ago, onward)    None          Loading dose: N/A  Regimen: 750 mg every 24 hours for 5 doses.  Start time: 22:00 on 2021  Exposure target: AUC24 (range)400-600 mg/L.hr  AUC24,ss: 513 mg/L.hr  PAUC*: 79 %  Ctrough,ss: 16.8 mg/L  Pconc*: 29 %  Tox.: 12 %          Plan:  1. Start vancomycin  750 mg IV q24h.   2. Vancomycin monitoring method: AUC  3. Vancomycin therapeutic monitoring goal: 400-600 mg*h/L  4. Pharmacy will check vancomycin levels as appropriate in 1-3 Days.    5. Serum creatinine levels will be ordered daily for the first week of therapy and at least twice weekly for subsequent weeks.      Marquise Ruth, KalebD, BCPS

## 2021-04-24 NOTE — ED TRIAGE NOTES
Pateint BIBA from home with complaints swelling and redness of right inguinal incision. Patient had AAA repair on 4/6. Patient started on PO Cephalexin on 4/22 by home care RN. EMS reports temp of 103.6 on arrival. 99.6 in ED.

## 2021-04-24 NOTE — CONSULTS
Cardiology Consult         Date of Service (when I saw the patient): 04/24/21    ASSESSMENT:   Maribel Yang is a 70 year old female who presents with PMHx of inferior STEMI s/p RCA stent on 4/07/2021, AAA prior fem-fem bypass s/p EVAR on 4/06/2021 who presents today for emergent pre-op eval.    #Inferior MI s/p LIUDMILA to RCA  #HFrEF with an EF:35-40%    Patient presenting with infected surgical site abscess who is being sent for abdominal washout. Cardiology consulted for pre-op evaluation. This is an emergent surgery per discussion with the surgical team.. Although RCRI does not necessarily apply in this case, her RCRI score is  3 which precludes a 15% 30 day cardiac mortality. Patient is moderate-high risk for surgery. In the absence of  active ACS, decompensated heart failure, severe valvular abnormalities, or ventricular tachycardia no indications for further cardiac diagnostic procedures. Additionally, no additional cardiac interventions that would significantly modify preoperative risk for MACE. Patient can proceed to surgery without additional cardiac work up.      RECOMMEND:  - Continue PO Metop 100mg BID  - Continue PO ASA 81mg  - Would continue PO Brilanta 90mg BID   - Continue PO Lipitor 80mg qday  - Hold ACEI     Staffed with Dr. Dejah Oro MD  Cardiology Fellow  [id number]      REASON FOR CONSULT: Pre-op    History of Present Illness   Maribel Yang is a 70 year old female who presents with PMHx of inferior STEMI s/p RCA stent on 4/07/2021, AAA prior fem-fem bypass s/p EVAR on 4/06/2021 who presents today for emergent pre-op eval.    Patient was admitted on 4/6/2021 for EVAR. After procedure developed significant chest comfort, EKG was noted to reveal inferior STEMI that necessitated LIUDMILA to the proximal RCA with plans for DAPT for one year. Patient was then discharged with plans to follow up with Cardiology.    Patient returns today for right femoral erythema that was later found to  be secondary to surgical site infection and perigraft fluid collection. Vascular surgery was consulted and recommend wound washout vs redo of her prior grafting. Cardiology was consulted for pre-op eval.    At bedside, patient denied chest pain, SOB, lightheadedness and dizziness. She denied PND, orthopnea, nausea, vomitting, diarrhea.     Past Medical History    I have reviewed this patient's medical history and updated it with pertinent information if needed.   Past Medical History:   Diagnosis Date     Abnormal coagulation profile     p [id number] heterozygote      Age-related osteoporosis without current pathological fracture 6/22/2019     Anemia      Antiplatelet or antithrombotic long-term use      ASCUS with positive high risk HPV 2007, 2015    + HPV 56, 54,& 6, colp - TAL III, Leep =TAL II     Basal cell carcinoma      Depressive disorder July 2015     Hypertension      Immunosuppressed status (H)     due meds     Kidney replaced by transplant 9/04    Living donor recipient,  Rejection 7/2005     LSIL (low grade squamous intraepithelial lesion) on Pap smear 4/2013    +HPV 33 or 45, 61       PAD (peripheral artery disease) (H)      PONV (postoperative nausea and vomiting)      Squamous cell lung cancer (H)      Thrombosis of leg 1967     Unspecified disorder of kidney and ureter     X-linked dominant Alport's syndrome.       Past Surgical History   I have reviewed this patient's surgical history and updated it with pertinent information if needed.  Past Surgical History:   Procedure Laterality Date     BIOPSY      Kidney, Lung, Breast     BIOPSY ANAL N/A 3/14/2018    Procedure: BIOPSY ANAL;;  Surgeon: Shabbir Leo MD;  Location:  OR      TRANSPLANTATION OF KIDNEY  9/04    recipient -- done at [facility]     COLONOSCOPY       COLONOSCOPY N/A 8/9/2017    Procedure: COMBINED COLONOSCOPY, SINGLE OR MULTIPLE BIOPSY/POLYPECTOMY BY BIOPSY;;  Surgeon: Sushil Hyatt MD;  Location:  GI     COLPOSCOPY,LOOP  ELECTRD CERVIX EXCIS  03/11/08    TAL II     CONIZATION LEEP  7/17/2013    Procedure: CONIZATION LEEP;;  Surgeon: Liliana Renteria MD;  Location: [facility]     CONIZATION LEEP N/A 8/17/2016    Procedure: CONIZATION LEEP;  Surgeon: Liliana Renteria MD;  Location: [facility]     CV CORONARY ANGIOGRAM N/A 4/6/2021    Procedure: CV CORONARY ANGIOGRAM;  Surgeon: Sincere Rosas MD;  Location:  HEART CARDIAC CATH LAB     CV PCI STENT DRUG ELUTING N/A 4/6/2021    Procedure: Percutaneous Coronary Intervention Stent Drug Eluting;  Surgeon: Sincere Rosas MD;  Location:  HEART CARDIAC CATH LAB     ENDOVASCULAR REPAIR ANEURYSM AORTOILIAC Bilateral 4/6/2021    Procedure: Endovascular Abdominal Aortic Aneurysm Repair with Aortouniiliac Device and Femoral-Femoral Artery Bypass with [id number] Artegraft;  Surgeon: Abena Ferrara MD;  Location: [facility]     EXAM UNDER ANESTHESIA ANUS  7/15/2014    Procedure: EXAM UNDER ANESTHESIA ANUS;  Surgeon: Radha Musa MD;  [facility]     EXAM UNDER ANESTHESIA ANUS N/A 3/14/2018    Procedure: EXAM UNDER ANESTHESIA ANUS;  Anal Exam Under Anesthesia With Excision of anal lesion, proctoscopy;  Surgeon: Shabbir Leo MD;  Location: [facility]     EYE SURGERY       GENITOURINARY SURGERY       IR OR ANGIOGRAM  4/6/2021     LASER CO2 EXCISE VULVA WIDE LOCAL  7/15/2014    Procedure: LASER CO2 EXCISE VULVA WIDE LOCAL;  Surgeon: Liliana Renteria MD;  [facility]: [facility]     LASER CO2 VAGINA  7/17/2013    Procedure: LASER CO2 VAGINA;;  Surgeon: Liliana Renteria MD;  [facility]     LASER CO2 VAGINA N/A 9/25/2018    Procedure: LASER CO2 VAGINA;  Exam Under Anesthesia, CO2 Laser Ablation of Upper Vagina and Cervix;  Surgeon: aPti Garcia MD;  Location: [facility]     MICROSCOPY ANAL  7/17/2013    Procedure: MICROSCOPY ANAL;  Anal Microscopy,  EUA vagina,Colposcopy Of Vagina And Vulva, Vaginal Biopsies, Omniguide Co2 Laser To Vagina and vulva, Loop  Electrosurgical Excision Procedure To Cervix;  Surgeon: Radha Musa MD;  Location: [facility]     MICROSCOPY ANAL  7/15/2014    Procedure: MICROSCOPY ANAL;  Surgeon: Radha Musa MD;  Location: [facility]     VASCULAR SURGERY      Thrombectomy     [facility] NONSPECIFIC PROCEDURE      Thrombectomy     [facility] NONSPECIFIC PROCEDURE  1955 and 1959    Bilater eye surgery - correction for crossed eyes     [facility] NONSPECIFIC PROCEDURE  1998    oopherectomy L     [facility] NONSPECIFIC PROCEDURE  1967    open kidney biopsy - L       Prior to Admission Medications   Prior to Admission Medications   Prescriptions Last Dose Informant Patient Reported? Taking?   Lactobacillus-Inulin ([facility]) CAPS   No No   Sig: TAKE ONE CAPSULE BY MOUTH EVERY DAY (DUE FOR PHYSICAL IN MARCH)   acetaminophen (TYLENOL) 325 MG tablet   No No   Sig: Take 2 tablets (650 mg) by mouth every 4 hours as needed for other (For optimal non-opioid multimodal pain management to improve pain control.)   amoxicillin (AMOXIL) 500 MG capsule   No No   Sig: Take 4 capsules (2,000 mg) by mouth once as needed Prior to dental procedures   aspirin (ASA) 81 MG chewable tablet   No No   Sig: Take 1 tablet (81 mg) by mouth daily   atorvastatin (LIPITOR) 80 MG tablet   No No   Sig: Take 1 tablet (80 mg) by mouth daily   calcium carbonate 600 mg-vitamin D 400 units (CALTRATE) 600-400 MG-UNIT per tablet   Yes No   Sig: Take 1 tablet by mouth 2 times daily   lidocaine (LIDODERM) 5 % patch   No No   Sig: Place 1 patch onto the skin every 24 hours To prevent lidocaine toxicity, patient should be patch free for 12 hrs daily.    Apply to low back as needed for back pain   lisinopril (ZESTRIL) 5 MG tablet   No No   Sig: Take 1 tablet (5 mg) by mouth daily   methocarbamol (ROBAXIN) 500 MG tablet   No No   Sig: Take 1 tablet (500 mg) by mouth 4 times daily as needed for muscle spasms   metoprolol tartrate (LOPRESSOR) 100 MG tablet   No No   Sig: Take 1 tablet  (100 mg) by mouth 2 times daily   mycophenolic acid (GENERIC EQUIVALENT) 360 MG EC tablet   No No   Sig: Take 1 tablet (360 mg) by mouth 2 times daily   oxyCODONE (ROXICODONE) 5 MG tablet   No No   Sig: Take 1 tablet (5 mg) by mouth every 6 hours as needed for moderate to severe pain   predniSONE (DELTASONE) 5 MG tablet   No No   Sig: Take 1 tablet (5 mg) by mouth daily   ticagrelor (BRILINTA) 90 MG tablet   No No   Sig: Take 1 tablet (90 mg) by mouth every 12 hours      Facility-Administered Medications: None     Allergies   Allergies   Allergen Reactions     Blood Transfusion Related (Informational Only) Other (See Comments)     Patient has a history of a clinically significant antibody against RBC antigens.  A delay in compatible RBCs may occur.     Ultracet Nausea and Vomiting and Hives     Hydrocodone Nausea and Vomiting and Hives       Social History   I have reviewed this patient's social history and updated it with pertinent information if needed. Maribel Yang  reports that she has quit smoking. Her smoking use included cigarettes. She started smoking about 54 years ago. She has a 10.50 pack-year smoking history. She has never used smokeless tobacco. She reports previous alcohol use. She reports previous drug use. Drug: Marijuana.    Family History   I have reviewed this patient's family history and updated it with pertinent information if needed.   Family History   Problem Relation Age of Onset     Diabetes Father         type 2 diag age,60's     Alcohol/Drug Father      Arthritis Father      Hypertension Father      Lipids Father         high cholesterol     Arthritis Mother      Diabetes Mother      Depression Mother      Heart Disease Mother      Neurologic Disorder Mother      Obesity Mother      Psychotic Disorder Mother      Thyroid Disease Mother      Hypertension Mother      Gynecology Sister         Precancerous cell removal from cervix at age 45     Depression Sister      Allergies Sister       Alcohol/Drug Sister      Neurologic Disorder Sister      Cerebrovascular Disease Paternal Grandmother          of a stroke in her 80's     Diabetes Paternal Grandmother      Alcohol/Drug Son      Colon Polyps Sister      Breast Cancer Niece      Other Cancer Sister         Cervical     Obesity Sister      Depression Sister      Substance Abuse Son      Substance Abuse Sister      Asthma Other      Colon Cancer No family hx of      Crohn's Disease No family hx of      Ulcerative Colitis No family hx of      Melanoma No family hx of      Skin Cancer No family hx of        Review of Systems   The 10 point Review of Systems is negative other than noted in the HPI or here. none    Physical Exam   Temp: 98.2  F (36.8  C) Temp src: Oral BP: 131/82 Pulse: 77   Resp: 16 SpO2: 98 % O2 Device: None (Room air)    Vital Signs with Ranges  Temp:  [98.2  F (36.8  C)-99.6  F (37.6  C)] 98.2  F (36.8  C)  Pulse:  [60-93] 77  Resp:  [16-18] 16  BP: (104-160)/(61-89) 131/82  SpO2:  [91 %-98 %] 98 %  101 lbs 14.4 oz    GEN: NAD, pleasant  HEENT: no icterus  CV: RRR, normal s1/s2, no murmurs/rubs/s3/s4, no heave. JVP 6cm.   CHEST: CTAB  ABD: soft, NT/ND, NABS  : no flank/suprapubic tenderness  NEURO: AA&Ox3, fluent/appropriate, motor grossly nonfocal  PSYCH: cooperative, affect appropriate    Data   Data reviewed today:  I personally reviewed no images or EKG's today.  Recent Labs   Lab 219 21  1935   WBC  --  13.4*   HGB  --  9.1*   MCV  --  92   PLT  --  529*   NA  --  132*   POTASSIUM  --  4.4   CHLORIDE  --  102   CO2  --  21   BUN  --  36*   CR  --  1.63*   ANIONGAP  --  9   KAYKAY  --  9.2   GLC  --  112*   ALBUMIN  --  2.0*   PROTTOTAL  --  6.1*   BILITOTAL  --  0.5   ALKPHOS  --  169*   ALT  --  15   AST  --  16   TROPI <0.015  --        Recent Results (from the past 24 hour(s))   CT Abdomen Pelvis w/o Contrast    Narrative    EXAMINATION: CT ABDOMEN PELVIS W/O CONTRAST, 2021 9:24 PM    TECHNIQUE:   Helical CT images from the lung bases through the  symphysis pubis were obtained without IV contrast. Contrast dose: None    COMPARISON: Chest abdomen and pelvis CTA 3/22/2021    HISTORY: Abdominal abscess/infection suspected. Status post EVAR and  aorto uniliac graft 4/6/21    FINDINGS:    Abdomen and pelvis: Postprocedural changes of abdominal aorta EVAR  with left uni-iliac graft. The distal limb of the graft is positioned  in the left common femoral artery. The proximal end of the graft is  located above the native renal arteries The abdominal aortic aneurysm  sac measures 6.2 x 5.8 cm on image 185 of series 5. There is mild  stranding surrounding the aneurysm sac consistent with postsurgical  changes. Vascular patency not evaluated on this noncontrast study.     Unremarkable noncontrast appearance of the liver, gallbladder,  pancreas, and spleen. Nondilated common bile duct. Left adrenal  calcification. Bilateral atrophic native kidneys. 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  A small hemorrhagic/proteinaceous cyst in the mid transplant, series 3  image 49 suspected measuring 7 mm.    No small bowel obstruction. Scattered stool throughout the colon.  Appendicoliths within nondilated appendix demonstrate. No adjacent  inflammation.    The bladder and uterus are within normal limits.    Lung bases/lower chest:  There is a pericardial effusion measuring up  to 1.5 cm in thickness on image 35 of series 5. Aneurysmal lower  thoracic aorta measuring up to 4.6 cm. Atherosclerosis. The lung bases  are relatively clear. Emphysematous changes. No pleural effusion.  There may be a small Boch[place]k fat-containing hernia along the right  hemidiaphragm.    Bones and soft tissues: There is an anterior abdominal wall fluid  collection with thick walls loculations measuring 7.9 x 3.4 cm and 6.0  x 2.1 cm on image 327 of series 5, extending from the right groin  surgical site  right common femoral artery along the femorofemoral  bypass graft to the left common femoral artery. There is surrounding  inflammatory stranding of the lower anterior abdominal wall and right  groin. No acute or aggressive appearing bone lesion. Body wall edema.      Impression    IMPRESSION:   1. Large thick-walled fluid collection in the lower abdominal wall  extending from the right groin surgical site and right common femoral  artery along the femoral-femoral bypass graft to the left common  femoral artery. Surgical consultation is recommended.    2. Postprocedural changes of abdominal aortic EVAR with left uni-iliac  graft.     I have personally reviewed the examination and initial interpretation  and I agree with the findings.    ELISSA RIVAS MD   US Lower Ext Arterial Duplex Limited Bilat    Narrative    Exam: US LOWER EXT ARTERIAL DUPLEX LIMITED BILAT, 4/24/2021 8:06 AM    Indication: evaluate right groin, left groin, and fem-fem bypass    Comparison: CT 4/23/2021    Findings:   There is a complex fluid collection in the anterior soft tissues in  the lower abdomen measuring approximately 8.5 x 17.4 x 2.7, this  corresponds to the fluid collection noted on the abdominal CT from  4/23/2021. The femoral femoral bypass graft is patent with with  monophasic/biphasic waveforms with a sharp systolic upstroke. The left  external iliac artery above the graft, common femoral artery below the  graft and left proximal profunda femoral artery are patent with normal  triphasic waveforms. The right superficial femoral artery below the  anastomosis is patent with monophasic waveforms with a sharp systolic  upstroke. The right common femoral artery at the anastomosis is patent  with triphasic waveforms.      Impression    Impression:   1. Patent femoral-femoral bypass graft.  2. Large complex lower abdominal wall postoperative fluid collection  measuring approximately 8.5 x 17.4 x 2.7 cm, this was  previously  visualized on CT 2021 and ultrasound 2021 however appears to  have increased in size. Consider surgical consultation.    I have personally reviewed the examination and initial interpretation  and I agree with the findings.    MAGALIE MARS MD   Echocardiogram Limited    Narrative    [id number]  [id number]  [id number]  [id number]^JACOB^DAVID^JAMI     [facility],[place]  Echocardiography Laboratory  [address] [place] [address]     Name: ERASMO MERINO  MRN: [id number]  : 1950  Study Date: 2021 09:57 AM  Age: 70 yrs  Gender: Female  Patient [facility]  Reason For Study: MI  Ordering Physician: DAVID JAMES  Performed By: Sayda Pina RDCS     BSA: 1.4 m2  Height: 62 in  Weight: 101 lb  BP: 131/82 mmHg  ______________________________________________________________________________  Procedure  Limited Portable Echo Adult. Contrast Optison. Optison (NDC #8377-9785-76)  given intravenously. Patient was given 5 ml mixture of 3 ml Optison and 6 ml  saline. 4 ml wasted.  ______________________________________________________________________________  Interpretation Summary  Moderately (EF 35-40%) reduced left ventricular function is present. There is  thinning and akinesis of the mid septal segment. Inferior wall akinesis is  present. Apical wall akinesis is present.  Trivial pericardial effusion is present.     This study was compared with the study from 2021. No significant changes  noted.  ______________________________________________________________________________  Left Ventricle  There is thinning and akinesis of the mid septal segment. Inferior wall  akinesis is present. Apical wall akinesis is present. Moderately (EF 35-40%)  reduced left ventricular function is present.     Mitral Valve  The mitral valve is normal. Mild mitral insufficiency is present.     Aortic Valve  Trileaflet aortic sclerosis without stenosis.      Tricuspid Valve  The tricuspid valve is normal. Mild tricuspid insufficiency is present.  Pulmonary artery systolic pressure cannot be assessed.     Vessels  The inferior vena cava was normal in size with preserved respiratory  variability.     Pericardium  Trivial pericardial effusion is present.     Compared to Previous Study  This study was compared with the study from 4/7/2021 . No significant changes  noted.     ______________________________________________________________________________  Report approved by: MD Kash Tomas 04/24/2021 10:32 AM     ______________________________________________________________________________

## 2021-04-24 NOTE — PROGRESS NOTES
"VASCULAR SURGERY PROGRESS NOTE    Subjective:  Sitting comfortably in bed in no acute distress.     Objective:No intake or output data in the 24 hours ending 04/24/21 1108  PHYSICAL EXAM:  /82   Pulse 77   Temp 98.2  F (36.8  C) (Oral)   Resp 16   Ht 1.575 m (5' 2\")   Wt 46.2 kg (101 lb 14.4 oz)   SpO2 98%   BMI 18.64 kg/m    General: The patient is alert and oriented. Appropriate. No acute distress  Psych: pleasant affect, answers questions appropriately  Respiratory: The patient does not require supplemental oxygen. Breathing unlabored  GI:  Abdomen soft, nontender to light palpation.  Extremities: Purulent drainage expressed from right groin; erythema around right groin incision. Left groin incision c/d/i.       Imaging:   Reviewed CT scan - large fluid collection around R groin and around femoral femoral bypass graft. WBC: 13.4.     ASSESSMENT:  69 yo F s/p EVAR AUI and left to right femoral to femoral bypass graft for 5.9 cm AAA. Initial post-op course was complicated by inferior STEMI requiring emergent cardiac catheterization and PCI to RCA. Patient now presenting with subjective fevers and chills and erythema and drainage from right groin wound.       PLAN:  - Plan for OR for R groin incision and drainage, sartorius muscle flap, possible femoral to femoral bypass graft explantation with revision with cadaveric homograft  - NPO; mIVF; hold Brilinta   - Aspirin, atorvastatin; metoprolol, lisinopril   - Continue broad spectrum antibiotics  - Will admit to IMU post-operatively     Discussed pt history, exam, assessment and plan with Dr. Lewis of the vascular surgery service, who is in agreement with the above.    Devendra Salgado MD  Division of Vascular Surgery   Pager: [phone]                    "

## 2021-04-24 NOTE — OR NURSING
Patient arrived from ED with PIV in Left AC with MT fluid running at 90cc, fluid collection at site, IV fluid stopped, new IV site pending.

## 2021-04-24 NOTE — CONSULTS
"Vascular Surgery Consult Note   Vascular Surgery Staff: Joshua  Requesting Staff: Demond  Date and Time: 04/23/21 8:55 PM     CC: \"It just started swelling and hurting.\"    HPI:   Maribel is a 71 yo woman with 5.9 cm infrarenal abdominal aortic aneurysm and 4.9 cm thoracic aortic aneurysm, as well as kidney transplant and history of lung and anal cancer (both in remission). She underwent endovascular repair of the abdominal aortic aneurysm with aortio uniiliac EVAR and fem-fem bypass with plugging of right common iliac artery on 04/06. She suffered an inferior MI immediately post-op, and underwent LIUDMILA placement of the proximal RCA. She developed erythema and drainage of the right groin incision several days ago. She was started on keflex at that time, however her symptoms persisted and she had a temperature of 103.6 F today. She was brought in to the ED by EMS for evaluation.     In the ED, labs were notable for leukocytosis and ongoing elevation in cr (about the same as her baseline). She was started on ceftriaxone and vancomycin. A CT A/P without contrast is currently pending. Initial review demonstrates fluid collection of rightt groin and surrounding the fem-fem.     She reports general malaise and decreased mobility since the swelling in the groin worsened.     Otherwise, tolerating regular diet, no nausea / vomiting. No new chest pain or cough.      Medical Hx:   AAA  TAA  PAD  Basal cell carcinoma  HTN  Alport syndrome s/p renal transplant  Lung cancer, in remission  Anal cancer, in remission   DVT  Right lower extremity claudication     Surgical Hx:   Kidney transplant (left pelvis)  Conization x several   Colonoscopy x several   EVAR AAA with occlusion of right common iliac artery and fem-fem bypass    Family Hx:   No family history of bleeding / bruising / clotting disorders / adverse reactions to anesthesia    ROS:   Otherwise negative    Medications:   APAP  ASA  Atorvastatin  Cephalexin  Lidocaine " "patches  Lisinopril  Robaxin  Metoprolol  Mycophenolate  Oxycodone  Prednisone  Ticagrelor     Allergies:   Known antibody reactions to RBC transfusions    Social Hx:   Lives at home with her   Retired from work as a   No ongoing tobacco or EtOH use    Physical Exam:   Vital signs:  Temp: 99.6  F (37.6  C) Temp src: Oral BP: (!) 160/89 Pulse: 93   Resp: 17 SpO2: 98 % O2 Device: None (Room air)   Height: 157.5 cm (5' 2\") Weight: 46.2 kg (101 lb 14.4 oz)  Estimated body mass index is 18.64 kg/m  as calculated from the following:    Height as of this encounter: 1.575 m (5' 2\").    Weight as of this encounter: 46.2 kg (101 lb 14.4 oz).    Thin adult woman, resting in bed, NAD. Appears older than stated age  Respirations non-labored on room air  RRR by radial pulse  Abdomen flat and soft  Right groin with erythema and swelling slightly extending touside of marked border (this was marked several days ago by home nursing). There is some hematoma drainage from the right groin incision and a couple blisters. The left groin incision is C/D/I. 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 Appropriate for non-operative management at this time. Will admit for ABX, will obtain ultrasound in AM to evaluate for pseudoanurysm. May require wound washout. Will also ask medicine to assist with management.     Plan:   Admit to vascular surgery  Continue ABX: vancomycin and zosyn  Duplex US of " right groin and fem-fem bypass in AM  Consult medicine for AM   EKG and trops to be obtained in ED as baseline    Discussed with Dr. Joshua Becerra MD  Surgery Resident PGY3

## 2021-04-24 NOTE — BRIEF OP NOTE
[facility]    Brief Operative Note    Pre-operative diagnosis:   1) Right groin wound infection    Post-operative diagnosis:  1) Right groin wound infection   2) Infected femoral to femoral bypass graft     Procedure: Procedure(s):  IRRIGATION AND DEBRIDEMENT, INGUINAL REGION, I & D PF RIGHT GROIN  Surgeon: Surgeon(s) and Role:     * Raven Lewis MD - Primary     * Devendra Salgado MD - Fellow - Assisting     Anesthesia: General   Estimated blood loss: Minimal    Drains: None  Specimens:   ID Type Source Tests Collected by Time Destination   1 : right groin abscess Abscess Groin ABSCESS CULTURE AEROBIC BACTERIAL, ANAEROBIC BACTERIAL CULTURE, FUNGUS CULTURE, GRAM STAIN Raven Lewis MD 4/24/2021  1:48 PM      Findings:     Purulent fluid surrounding right groin Artegraft and bypass graft within the tunnel. Wound was packed with moistened Kerlix gauze, 4x4s, and ABD pads.    Complications: None.  Implants: * No implants in log *       Plan will be to change dressing at 2000. Will place Adaptic at the base of the wound and loosely pack moistened Kerlix gauze. Continue IV antibiotics. Will plan to return to the operating room tomorrow afternoon for explantation of previous femoral to femoral and revision with cryoartery.

## 2021-04-25 ENCOUNTER — APPOINTMENT (OUTPATIENT)
Dept: GENERAL RADIOLOGY | Facility: CLINIC | Age: 71
DRG: 252 | End: 2021-04-25
Attending: ANESTHESIOLOGY
Payer: COMMERCIAL

## 2021-04-25 ENCOUNTER — ANESTHESIA (OUTPATIENT)
Dept: SURGERY | Facility: CLINIC | Age: 71
DRG: 252 | End: 2021-04-25
Payer: COMMERCIAL

## 2021-04-25 LAB
ANION GAP SERPL CALCULATED.3IONS-SCNC: 9 MMOL/L (ref 3–14)
APTT PPP: 26 SEC (ref 22–37)
BASE DEFICIT BLDA-SCNC: 5 MMOL/L
BASE DEFICIT BLDA-SCNC: 5.8 MMOL/L
BUN SERPL-MCNC: 33 MG/DL (ref 7–30)
CA-I BLD-MCNC: 5 MG/DL (ref 4.4–5.2)
CA-I BLD-MCNC: 5.1 MG/DL (ref 4.4–5.2)
CALCIUM SERPL-MCNC: 8.5 MG/DL (ref 8.5–10.1)
CHLORIDE SERPL-SCNC: 109 MMOL/L (ref 94–109)
CO2 SERPL-SCNC: 19 MMOL/L (ref 20–32)
CREAT SERPL-MCNC: 1.46 MG/DL (ref 0.52–1.04)
ERYTHROCYTE [DISTWIDTH] IN BLOOD BY AUTOMATED COUNT: 15.4 % (ref 10–15)
GFR SERPL CREATININE-BSD FRML MDRD: 37 ML/MIN/{1.73_M2}
GLUCOSE BLD-MCNC: 128 MG/DL (ref 70–99)
GLUCOSE BLD-MCNC: 132 MG/DL (ref 70–99)
GLUCOSE SERPL-MCNC: 108 MG/DL (ref 70–99)
GRAM STN SPEC: ABNORMAL
GRAM STN SPEC: ABNORMAL
GRAM STN SPEC: NORMAL
GRAM STN SPEC: NORMAL
HCO3 BLD-SCNC: 20 MMOL/L (ref 21–28)
HCO3 BLD-SCNC: 21 MMOL/L (ref 21–28)
HCT VFR BLD AUTO: 29.2 % (ref 35–47)
HGB BLD-MCNC: 10 G/DL (ref 11.7–15.7)
HGB BLD-MCNC: 9.4 G/DL (ref 11.7–15.7)
HGB BLD-MCNC: 9.8 G/DL (ref 11.7–15.7)
HGB BLD-MCNC: 9.9 G/DL (ref 11.7–15.7)
INR PPP: 1.18 (ref 0.86–1.14)
LACTATE BLD-SCNC: 0.7 MMOL/L (ref 0.7–2)
LACTATE BLD-SCNC: 0.8 MMOL/L (ref 0.7–2)
LACTATE BLD-SCNC: 0.9 MMOL/L (ref 0.7–2)
MCH RBC QN AUTO: 28.5 PG (ref 26.5–33)
MCHC RBC AUTO-ENTMCNC: 32.2 G/DL (ref 31.5–36.5)
MCV RBC AUTO: 89 FL (ref 78–100)
O2/TOTAL GAS SETTING VFR VENT: 0.44 %
O2/TOTAL GAS SETTING VFR VENT: 43 %
PCO2 BLD: 39 MM HG (ref 35–45)
PCO2 BLD: 43 MM HG (ref 35–45)
PH BLD: 7.3 PH (ref 7.35–7.45)
PH BLD: 7.32 PH (ref 7.35–7.45)
PLATELET # BLD AUTO: 428 10E9/L (ref 150–450)
PO2 BLD: 110 MM HG (ref 80–105)
PO2 BLD: 124 MM HG (ref 80–105)
POTASSIUM BLD-SCNC: 4.2 MMOL/L (ref 3.4–5.3)
POTASSIUM BLD-SCNC: 4.7 MMOL/L (ref 3.4–5.3)
POTASSIUM SERPL-SCNC: 4.1 MMOL/L (ref 3.4–5.3)
RBC # BLD AUTO: 3.3 10E12/L (ref 3.8–5.2)
SODIUM BLD-SCNC: 134 MMOL/L (ref 133–144)
SODIUM BLD-SCNC: 137 MMOL/L (ref 133–144)
SODIUM SERPL-SCNC: 137 MMOL/L (ref 133–144)
SPECIMEN SOURCE: ABNORMAL
SPECIMEN SOURCE: NORMAL
TROPONIN I SERPL-MCNC: 0.12 UG/L (ref 0–0.04)
WBC # BLD AUTO: 12.8 10E9/L (ref 4–11)

## 2021-04-25 PROCEDURE — 250N000009 HC RX 250: Performed by: NURSE ANESTHETIST, CERTIFIED REGISTERED

## 2021-04-25 PROCEDURE — C1725 CATH, TRANSLUMIN NON-LASER: HCPCS | Performed by: INTERNAL MEDICINE

## 2021-04-25 PROCEDURE — 258N000003 HC RX IP 258 OP 636: Performed by: SURGERY

## 2021-04-25 PROCEDURE — 250N000011 HC RX IP 250 OP 636: Performed by: INTERNAL MEDICINE

## 2021-04-25 PROCEDURE — 99207 PR APP CREDIT; MD BILLING SHARED VISIT: CPT | Performed by: INTERNAL MEDICINE

## 2021-04-25 PROCEDURE — 99152 MOD SED SAME PHYS/QHP 5/>YRS: CPT | Performed by: INTERNAL MEDICINE

## 2021-04-25 PROCEDURE — 84295 ASSAY OF SERUM SODIUM: CPT

## 2021-04-25 PROCEDURE — 84132 ASSAY OF SERUM POTASSIUM: CPT | Performed by: SURGERY

## 2021-04-25 PROCEDURE — 360N000077 HC SURGERY LEVEL 4, PER MIN: Performed by: SURGERY

## 2021-04-25 PROCEDURE — C1894 INTRO/SHEATH, NON-LASER: HCPCS | Performed by: INTERNAL MEDICINE

## 2021-04-25 PROCEDURE — 258N000003 HC RX IP 258 OP 636: Performed by: NURSE ANESTHETIST, CERTIFIED REGISTERED

## 2021-04-25 PROCEDURE — 04PY0JZ REMOVAL OF SYNTHETIC SUBSTITUTE FROM LOWER ARTERY, OPEN APPROACH: ICD-10-PCS | Performed by: SURGERY

## 2021-04-25 PROCEDURE — 250N000013 HC RX MED GY IP 250 OP 250 PS 637: Performed by: STUDENT IN AN ORGANIZED HEALTH CARE EDUCATION/TRAINING PROGRAM

## 2021-04-25 PROCEDURE — 83605 ASSAY OF LACTIC ACID: CPT

## 2021-04-25 PROCEDURE — 85018 HEMOGLOBIN: CPT | Performed by: ANESTHESIOLOGY

## 2021-04-25 PROCEDURE — 250N000011 HC RX IP 250 OP 636: Performed by: SURGERY

## 2021-04-25 PROCEDURE — 278N000051 HC OR IMPLANT GENERAL: Performed by: SURGERY

## 2021-04-25 PROCEDURE — 258N000001 HC RX 258: Performed by: SURGERY

## 2021-04-25 PROCEDURE — 99221 1ST HOSP IP/OBS SF/LOW 40: CPT | Performed by: PHYSICIAN ASSISTANT

## 2021-04-25 PROCEDURE — 87147 CULTURE TYPE IMMUNOLOGIC: CPT | Performed by: SURGERY

## 2021-04-25 PROCEDURE — 250N000011 HC RX IP 250 OP 636: Performed by: STUDENT IN AN ORGANIZED HEALTH CARE EDUCATION/TRAINING PROGRAM

## 2021-04-25 PROCEDURE — 85027 COMPLETE CBC AUTOMATED: CPT | Performed by: ANESTHESIOLOGY

## 2021-04-25 PROCEDURE — 88305 TISSUE EXAM BY PATHOLOGIST: CPT | Mod: TC | Performed by: SURGERY

## 2021-04-25 PROCEDURE — 99153 MOD SED SAME PHYS/QHP EA: CPT | Performed by: INTERNAL MEDICINE

## 2021-04-25 PROCEDURE — 80048 BASIC METABOLIC PNL TOTAL CA: CPT | Performed by: ANESTHESIOLOGY

## 2021-04-25 PROCEDURE — 0KXQ0ZZ TRANSFER RIGHT UPPER LEG MUSCLE, OPEN APPROACH: ICD-10-PCS | Performed by: SURGERY

## 2021-04-25 PROCEDURE — 87070 CULTURE OTHR SPECIMN AEROBIC: CPT | Performed by: SURGERY

## 2021-04-25 PROCEDURE — 87075 CULTR BACTERIA EXCEPT BLOOD: CPT | Performed by: SURGERY

## 2021-04-25 PROCEDURE — 84295 ASSAY OF SERUM SODIUM: CPT | Performed by: SURGERY

## 2021-04-25 PROCEDURE — 71045 X-RAY EXAM CHEST 1 VIEW: CPT | Mod: 26 | Performed by: RADIOLOGY

## 2021-04-25 PROCEDURE — 82803 BLOOD GASES ANY COMBINATION: CPT

## 2021-04-25 PROCEDURE — 82330 ASSAY OF CALCIUM: CPT | Performed by: SURGERY

## 2021-04-25 PROCEDURE — 250N000024 HC ISOFLURANE, PER MIN: Performed by: SURGERY

## 2021-04-25 PROCEDURE — 250N000011 HC RX IP 250 OP 636: Performed by: NURSE ANESTHETIST, CERTIFIED REGISTERED

## 2021-04-25 PROCEDURE — 83735 ASSAY OF MAGNESIUM: CPT | Performed by: ANESTHESIOLOGY

## 2021-04-25 PROCEDURE — 87102 FUNGUS ISOLATION CULTURE: CPT | Performed by: SURGERY

## 2021-04-25 PROCEDURE — 250N000012 HC RX MED GY IP 250 OP 636 PS 637: Performed by: STUDENT IN AN ORGANIZED HEALTH CARE EDUCATION/TRAINING PROGRAM

## 2021-04-25 PROCEDURE — 250N000013 HC RX MED GY IP 250 OP 250 PS 637: Performed by: SURGERY

## 2021-04-25 PROCEDURE — 999N000141 HC STATISTIC PRE-PROCEDURE NURSING ASSESSMENT: Performed by: SURGERY

## 2021-04-25 PROCEDURE — 82330 ASSAY OF CALCIUM: CPT

## 2021-04-25 PROCEDURE — 85610 PROTHROMBIN TIME: CPT | Performed by: SURGERY

## 2021-04-25 PROCEDURE — 83605 ASSAY OF LACTIC ACID: CPT | Performed by: SURGERY

## 2021-04-25 PROCEDURE — 041K0KJ BYPASS RIGHT FEMORAL ARTERY TO LEFT FEMORAL ARTERY WITH NONAUTOLOGOUS TISSUE SUBSTITUTE, OPEN APPROACH: ICD-10-PCS | Performed by: SURGERY

## 2021-04-25 PROCEDURE — 87205 SMEAR GRAM STAIN: CPT | Performed by: SURGERY

## 2021-04-25 PROCEDURE — 250N000009 HC RX 250: Performed by: INTERNAL MEDICINE

## 2021-04-25 PROCEDURE — 88305 TISSUE EXAM BY PATHOLOGIST: CPT | Mod: 26 | Performed by: PATHOLOGY

## 2021-04-25 PROCEDURE — 120N000002 HC R&B MED SURG/OB UMMC

## 2021-04-25 PROCEDURE — 250N000013 HC RX MED GY IP 250 OP 250 PS 637

## 2021-04-25 PROCEDURE — 999N000065 XR CHEST PORT 1 VIEW

## 2021-04-25 PROCEDURE — 82803 BLOOD GASES ANY COMBINATION: CPT | Performed by: SURGERY

## 2021-04-25 PROCEDURE — 99291 CRITICAL CARE FIRST HOUR: CPT | Performed by: INTERNAL MEDICINE

## 2021-04-25 PROCEDURE — 710N000011 HC RECOVERY PHASE 1, LEVEL 3, PER MIN: Performed by: SURGERY

## 2021-04-25 PROCEDURE — 272N000001 HC OR GENERAL SUPPLY STERILE: Performed by: INTERNAL MEDICINE

## 2021-04-25 PROCEDURE — 250N000013 HC RX MED GY IP 250 OP 250 PS 637: Performed by: INTERNAL MEDICINE

## 2021-04-25 PROCEDURE — 250N000009 HC RX 250: Performed by: STUDENT IN AN ORGANIZED HEALTH CARE EDUCATION/TRAINING PROGRAM

## 2021-04-25 PROCEDURE — 93005 ELECTROCARDIOGRAM TRACING: CPT

## 2021-04-25 PROCEDURE — 85027 COMPLETE CBC AUTOMATED: CPT | Performed by: SURGERY

## 2021-04-25 PROCEDURE — 82947 ASSAY GLUCOSE BLOOD QUANT: CPT

## 2021-04-25 PROCEDURE — 370N000017 HC ANESTHESIA TECHNICAL FEE, PER MIN: Performed by: SURGERY

## 2021-04-25 PROCEDURE — C1887 CATHETER, GUIDING: HCPCS | Performed by: INTERNAL MEDICINE

## 2021-04-25 PROCEDURE — B2111ZZ FLUOROSCOPY OF MULTIPLE CORONARY ARTERIES USING LOW OSMOLAR CONTRAST: ICD-10-PCS | Performed by: INTERNAL MEDICINE

## 2021-04-25 PROCEDURE — 200N000002 HC R&B ICU UMMC

## 2021-04-25 PROCEDURE — 272N000001 HC OR GENERAL SUPPLY STERILE: Performed by: SURGERY

## 2021-04-25 PROCEDURE — 80048 BASIC METABOLIC PNL TOTAL CA: CPT | Performed by: SURGERY

## 2021-04-25 PROCEDURE — 250N000009 HC RX 250: Performed by: SURGERY

## 2021-04-25 PROCEDURE — 84132 ASSAY OF SERUM POTASSIUM: CPT

## 2021-04-25 PROCEDURE — 87186 SC STD MICRODIL/AGAR DIL: CPT | Performed by: SURGERY

## 2021-04-25 PROCEDURE — 0KXR0ZZ TRANSFER LEFT UPPER LEG MUSCLE, OPEN APPROACH: ICD-10-PCS | Performed by: SURGERY

## 2021-04-25 PROCEDURE — 85730 THROMBOPLASTIN TIME PARTIAL: CPT | Performed by: SURGERY

## 2021-04-25 PROCEDURE — C1769 GUIDE WIRE: HCPCS | Performed by: INTERNAL MEDICINE

## 2021-04-25 PROCEDURE — 83605 ASSAY OF LACTIC ACID: CPT | Performed by: STUDENT IN AN ORGANIZED HEALTH CARE EDUCATION/TRAINING PROGRAM

## 2021-04-25 PROCEDURE — 84100 ASSAY OF PHOSPHORUS: CPT | Performed by: ANESTHESIOLOGY

## 2021-04-25 PROCEDURE — 93454 CORONARY ARTERY ANGIO S&I: CPT | Performed by: INTERNAL MEDICINE

## 2021-04-25 PROCEDURE — 87077 CULTURE AEROBIC IDENTIFY: CPT | Performed by: SURGERY

## 2021-04-25 PROCEDURE — 82947 ASSAY GLUCOSE BLOOD QUANT: CPT | Performed by: SURGERY

## 2021-04-25 PROCEDURE — 93010 ELECTROCARDIOGRAM REPORT: CPT | Mod: 59 | Performed by: INTERNAL MEDICINE

## 2021-04-25 PROCEDURE — 99207 PR CONSULT E&M CHANGED TO INITIAL LEVEL: CPT | Performed by: PHYSICIAN ASSISTANT

## 2021-04-25 PROCEDURE — 84484 ASSAY OF TROPONIN QUANT: CPT | Performed by: ANESTHESIOLOGY

## 2021-04-25 RX ORDER — NALOXONE HYDROCHLORIDE 0.4 MG/ML
0.2 INJECTION, SOLUTION INTRAMUSCULAR; INTRAVENOUS; SUBCUTANEOUS
Status: DISCONTINUED | OUTPATIENT
Start: 2021-04-25 | End: 2021-04-25

## 2021-04-25 RX ORDER — ONDANSETRON 4 MG/1
4 TABLET, ORALLY DISINTEGRATING ORAL EVERY 30 MIN PRN
Status: DISCONTINUED | OUTPATIENT
Start: 2021-04-25 | End: 2021-04-25

## 2021-04-25 RX ORDER — NALOXONE HYDROCHLORIDE 0.4 MG/ML
0.4 INJECTION, SOLUTION INTRAMUSCULAR; INTRAVENOUS; SUBCUTANEOUS
Status: DISCONTINUED | OUTPATIENT
Start: 2021-04-25 | End: 2021-04-25

## 2021-04-25 RX ORDER — NALOXONE HYDROCHLORIDE 0.4 MG/ML
0.4 INJECTION, SOLUTION INTRAMUSCULAR; INTRAVENOUS; SUBCUTANEOUS
Status: DISCONTINUED | OUTPATIENT
Start: 2021-04-25 | End: 2021-04-26

## 2021-04-25 RX ORDER — SODIUM CHLORIDE, SODIUM LACTATE, POTASSIUM CHLORIDE, CALCIUM CHLORIDE 600; 310; 30; 20 MG/100ML; MG/100ML; MG/100ML; MG/100ML
INJECTION, SOLUTION INTRAVENOUS CONTINUOUS PRN
Status: DISCONTINUED | OUTPATIENT
Start: 2021-04-25 | End: 2021-04-25

## 2021-04-25 RX ORDER — NALOXONE HYDROCHLORIDE 0.4 MG/ML
0.4 INJECTION, SOLUTION INTRAMUSCULAR; INTRAVENOUS; SUBCUTANEOUS
Status: DISCONTINUED | OUTPATIENT
Start: 2021-04-25 | End: 2021-05-02 | Stop reason: HOSPADM

## 2021-04-25 RX ORDER — NITROGLYCERIN 20 MG/100ML
50-200 INJECTION INTRAVENOUS CONTINUOUS
Status: DISCONTINUED | OUTPATIENT
Start: 2021-04-26 | End: 2021-04-26

## 2021-04-25 RX ORDER — NALOXONE HYDROCHLORIDE 0.4 MG/ML
0.2 INJECTION, SOLUTION INTRAMUSCULAR; INTRAVENOUS; SUBCUTANEOUS
Status: DISCONTINUED | OUTPATIENT
Start: 2021-04-25 | End: 2021-05-02 | Stop reason: HOSPADM

## 2021-04-25 RX ORDER — ASPIRIN 325 MG
325 TABLET ORAL ONCE
Status: COMPLETED | OUTPATIENT
Start: 2021-04-25 | End: 2021-04-25

## 2021-04-25 RX ORDER — ONDANSETRON 2 MG/ML
4 INJECTION INTRAMUSCULAR; INTRAVENOUS EVERY 30 MIN PRN
Status: DISCONTINUED | OUTPATIENT
Start: 2021-04-25 | End: 2021-04-25

## 2021-04-25 RX ORDER — ATROPINE SULFATE 0.4 MG/ML
AMPUL (ML) INJECTION
Status: DISCONTINUED | OUTPATIENT
Start: 2021-04-25 | End: 2021-04-25 | Stop reason: HOSPADM

## 2021-04-25 RX ORDER — HEPARIN SODIUM 10000 [USP'U]/100ML
0-5000 INJECTION, SOLUTION INTRAVENOUS CONTINUOUS
Status: DISCONTINUED | OUTPATIENT
Start: 2021-04-25 | End: 2021-04-26

## 2021-04-25 RX ORDER — FLUMAZENIL 0.1 MG/ML
0.2 INJECTION, SOLUTION INTRAVENOUS
Status: ACTIVE | OUTPATIENT
Start: 2021-04-25 | End: 2021-04-26

## 2021-04-25 RX ORDER — SODIUM CHLORIDE, SODIUM LACTATE, POTASSIUM CHLORIDE, CALCIUM CHLORIDE 600; 310; 30; 20 MG/100ML; MG/100ML; MG/100ML; MG/100ML
INJECTION, SOLUTION INTRAVENOUS CONTINUOUS
Status: DISCONTINUED | OUTPATIENT
Start: 2021-04-25 | End: 2021-04-25 | Stop reason: HOSPADM

## 2021-04-25 RX ORDER — SODIUM CHLORIDE, SODIUM LACTATE, POTASSIUM CHLORIDE, CALCIUM CHLORIDE 600; 310; 30; 20 MG/100ML; MG/100ML; MG/100ML; MG/100ML
INJECTION, SOLUTION INTRAVENOUS CONTINUOUS
Status: DISCONTINUED | OUTPATIENT
Start: 2021-04-25 | End: 2021-04-25 | Stop reason: DRUGHIGH

## 2021-04-25 RX ORDER — SODIUM CHLORIDE, SODIUM GLUCONATE, SODIUM ACETATE, POTASSIUM CHLORIDE AND MAGNESIUM CHLORIDE 526; 502; 368; 37; 30 MG/100ML; MG/100ML; MG/100ML; MG/100ML; MG/100ML
INJECTION, SOLUTION INTRAVENOUS CONTINUOUS PRN
Status: DISCONTINUED | OUTPATIENT
Start: 2021-04-25 | End: 2021-04-25

## 2021-04-25 RX ORDER — NITROGLYCERIN 5 MG/ML
VIAL (ML) INTRAVENOUS
Status: DISCONTINUED | OUTPATIENT
Start: 2021-04-25 | End: 2021-04-25 | Stop reason: HOSPADM

## 2021-04-25 RX ORDER — NALOXONE HYDROCHLORIDE 0.4 MG/ML
0.2 INJECTION, SOLUTION INTRAMUSCULAR; INTRAVENOUS; SUBCUTANEOUS
Status: DISCONTINUED | OUTPATIENT
Start: 2021-04-25 | End: 2021-04-26

## 2021-04-25 RX ORDER — FENTANYL CITRATE 50 UG/ML
25-50 INJECTION, SOLUTION INTRAMUSCULAR; INTRAVENOUS
Status: DISCONTINUED | OUTPATIENT
Start: 2021-04-25 | End: 2021-04-25 | Stop reason: HOSPADM

## 2021-04-25 RX ORDER — HYDROMORPHONE HYDROCHLORIDE 1 MG/ML
.3-.5 INJECTION, SOLUTION INTRAMUSCULAR; INTRAVENOUS; SUBCUTANEOUS EVERY 5 MIN PRN
Status: DISCONTINUED | OUTPATIENT
Start: 2021-04-25 | End: 2021-04-25 | Stop reason: HOSPADM

## 2021-04-25 RX ORDER — EPHEDRINE SULFATE 50 MG/ML
INJECTION, SOLUTION INTRAMUSCULAR; INTRAVENOUS; SUBCUTANEOUS PRN
Status: DISCONTINUED | OUTPATIENT
Start: 2021-04-25 | End: 2021-04-25

## 2021-04-25 RX ORDER — HEPARIN SODIUM 1000 [USP'U]/ML
INJECTION, SOLUTION INTRAVENOUS; SUBCUTANEOUS
Status: DISCONTINUED | OUTPATIENT
Start: 2021-04-25 | End: 2021-04-25 | Stop reason: HOSPADM

## 2021-04-25 RX ORDER — LIDOCAINE HYDROCHLORIDE 20 MG/ML
INJECTION, SOLUTION INFILTRATION; PERINEURAL PRN
Status: DISCONTINUED | OUTPATIENT
Start: 2021-04-25 | End: 2021-04-25

## 2021-04-25 RX ORDER — FENTANYL CITRATE 50 UG/ML
25-50 INJECTION, SOLUTION INTRAMUSCULAR; INTRAVENOUS
Status: ACTIVE | OUTPATIENT
Start: 2021-04-25 | End: 2021-04-26

## 2021-04-25 RX ORDER — DEXAMETHASONE SODIUM PHOSPHATE 4 MG/ML
INJECTION, SOLUTION INTRA-ARTICULAR; INTRALESIONAL; INTRAMUSCULAR; INTRAVENOUS; SOFT TISSUE PRN
Status: DISCONTINUED | OUTPATIENT
Start: 2021-04-25 | End: 2021-04-25

## 2021-04-25 RX ORDER — ATROPINE SULFATE 0.1 MG/ML
0.5 INJECTION INTRAVENOUS
Status: ACTIVE | OUTPATIENT
Start: 2021-04-25 | End: 2021-04-26

## 2021-04-25 RX ORDER — FLUTICASONE PROPIONATE 50 MCG
2 SPRAY, SUSPENSION (ML) NASAL DAILY
Status: DISCONTINUED | OUTPATIENT
Start: 2021-04-25 | End: 2021-05-02 | Stop reason: HOSPADM

## 2021-04-25 RX ORDER — PROPOFOL 10 MG/ML
INJECTION, EMULSION INTRAVENOUS PRN
Status: DISCONTINUED | OUTPATIENT
Start: 2021-04-25 | End: 2021-04-25

## 2021-04-25 RX ORDER — IOPAMIDOL 755 MG/ML
INJECTION, SOLUTION INTRAVASCULAR
Status: DISCONTINUED | OUTPATIENT
Start: 2021-04-25 | End: 2021-04-25 | Stop reason: HOSPADM

## 2021-04-25 RX ORDER — FENTANYL CITRATE 50 UG/ML
INJECTION, SOLUTION INTRAMUSCULAR; INTRAVENOUS PRN
Status: DISCONTINUED | OUTPATIENT
Start: 2021-04-25 | End: 2021-04-25

## 2021-04-25 RX ORDER — FENTANYL CITRATE 50 UG/ML
INJECTION, SOLUTION INTRAMUSCULAR; INTRAVENOUS
Status: DISCONTINUED | OUTPATIENT
Start: 2021-04-25 | End: 2021-04-25 | Stop reason: HOSPADM

## 2021-04-25 RX ORDER — LABETALOL HYDROCHLORIDE 5 MG/ML
10 INJECTION, SOLUTION INTRAVENOUS
Status: DISCONTINUED | OUTPATIENT
Start: 2021-04-25 | End: 2021-04-25 | Stop reason: HOSPADM

## 2021-04-25 RX ORDER — ACETAMINOPHEN 160 MG
TABLET,DISINTEGRATING ORAL PRN
Status: DISCONTINUED | OUTPATIENT
Start: 2021-04-25 | End: 2021-04-25

## 2021-04-25 RX ADMIN — PIPERACILLIN AND TAZOBACTAM 2.25 G: 2; .25 INJECTION, POWDER, FOR SOLUTION INTRAVENOUS at 07:54

## 2021-04-25 RX ADMIN — LIDOCAINE HYDROCHLORIDE 100 MG: 20 INJECTION, SOLUTION INFILTRATION; PERINEURAL at 17:48

## 2021-04-25 RX ADMIN — PHENYLEPHRINE HYDROCHLORIDE 100 MCG: 10 INJECTION INTRAVENOUS at 20:30

## 2021-04-25 RX ADMIN — FENTANYL CITRATE 25 MCG: 50 INJECTION, SOLUTION INTRAMUSCULAR; INTRAVENOUS at 20:58

## 2021-04-25 RX ADMIN — SODIUM CHLORIDE, POTASSIUM CHLORIDE, SODIUM LACTATE AND CALCIUM CHLORIDE: 600; 310; 30; 20 INJECTION, SOLUTION INTRAVENOUS at 16:47

## 2021-04-25 RX ADMIN — NITROGLYCERIN 50 MCG/MIN: 20 INJECTION INTRAVENOUS at 23:37

## 2021-04-25 RX ADMIN — FENTANYL CITRATE 50 MCG: 50 INJECTION, SOLUTION INTRAMUSCULAR; INTRAVENOUS at 19:38

## 2021-04-25 RX ADMIN — PHENYLEPHRINE HYDROCHLORIDE 200 MCG: 10 INJECTION INTRAVENOUS at 18:17

## 2021-04-25 RX ADMIN — PHENYLEPHRINE HYDROCHLORIDE 100 MCG: 10 INJECTION INTRAVENOUS at 17:48

## 2021-04-25 RX ADMIN — ROCURONIUM BROMIDE 10 MG: 10 INJECTION INTRAVENOUS at 20:15

## 2021-04-25 RX ADMIN — FENTANYL CITRATE 50 MCG: 50 INJECTION, SOLUTION INTRAMUSCULAR; INTRAVENOUS at 18:56

## 2021-04-25 RX ADMIN — ATORVASTATIN CALCIUM 80 MG: 40 TABLET, FILM COATED ORAL at 07:53

## 2021-04-25 RX ADMIN — PIPERACILLIN AND TAZOBACTAM 2.25 G: 2; .25 INJECTION, POWDER, FOR SOLUTION INTRAVENOUS at 13:08

## 2021-04-25 RX ADMIN — PHENYLEPHRINE HYDROCHLORIDE 0.5 MCG/KG/MIN: 10 INJECTION INTRAVENOUS at 18:22

## 2021-04-25 RX ADMIN — FENTANYL CITRATE 100 MCG: 50 INJECTION, SOLUTION INTRAMUSCULAR; INTRAVENOUS at 18:30

## 2021-04-25 RX ADMIN — LISINOPRIL 5 MG: 5 TABLET ORAL at 07:53

## 2021-04-25 RX ADMIN — MYCOPHENOLIC ACID 360 MG: 360 TABLET, DELAYED RELEASE ORAL at 07:54

## 2021-04-25 RX ADMIN — SODIUM CHLORIDE, SODIUM GLUCONATE, SODIUM ACETATE, POTASSIUM CHLORIDE AND MAGNESIUM CHLORIDE: 526; 502; 368; 37; 30 INJECTION, SOLUTION INTRAVENOUS at 17:37

## 2021-04-25 RX ADMIN — PIPERACILLIN AND TAZOBACTAM 2.25 G: 2; .25 INJECTION, POWDER, FOR SOLUTION INTRAVENOUS at 19:00

## 2021-04-25 RX ADMIN — PREDNISONE 5 MG: 5 TABLET ORAL at 07:53

## 2021-04-25 RX ADMIN — PHENYLEPHRINE HYDROCHLORIDE 100 MCG: 10 INJECTION INTRAVENOUS at 18:10

## 2021-04-25 RX ADMIN — PROPOFOL 70 MG: 10 INJECTION, EMULSION INTRAVENOUS at 17:48

## 2021-04-25 RX ADMIN — ASPIRIN 81 MG CHEWABLE TABLET 81 MG: 81 TABLET CHEWABLE at 07:54

## 2021-04-25 RX ADMIN — PHENYLEPHRINE HYDROCHLORIDE 100 MCG: 10 INJECTION INTRAVENOUS at 18:01

## 2021-04-25 RX ADMIN — METOPROLOL TARTRATE 100 MG: 50 TABLET, FILM COATED ORAL at 07:53

## 2021-04-25 RX ADMIN — ROCURONIUM BROMIDE 50 MG: 10 INJECTION INTRAVENOUS at 17:48

## 2021-04-25 RX ADMIN — PIPERACILLIN AND TAZOBACTAM 2.25 G: 2; .25 INJECTION, POWDER, FOR SOLUTION INTRAVENOUS at 02:03

## 2021-04-25 RX ADMIN — TICAGRELOR 90 MG: 90 TABLET ORAL at 22:05

## 2021-04-25 RX ADMIN — ONDANSETRON 4 MG: 2 INJECTION INTRAMUSCULAR; INTRAVENOUS at 20:42

## 2021-04-25 RX ADMIN — DEXAMETHASONE SODIUM PHOSPHATE 4 MG: 4 INJECTION, SOLUTION INTRA-ARTICULAR; INTRALESIONAL; INTRAMUSCULAR; INTRAVENOUS; SOFT TISSUE at 17:48

## 2021-04-25 RX ADMIN — FENTANYL CITRATE 150 MCG: 50 INJECTION, SOLUTION INTRAMUSCULAR; INTRAVENOUS at 17:48

## 2021-04-25 RX ADMIN — ASPIRIN 325 MG ORAL TABLET 325 MG: 325 PILL ORAL at 22:35

## 2021-04-25 RX ADMIN — SODIUM CHLORIDE, POTASSIUM CHLORIDE, SODIUM LACTATE AND CALCIUM CHLORIDE: 600; 310; 30; 20 INJECTION, SOLUTION INTRAVENOUS at 18:20

## 2021-04-25 RX ADMIN — SUGAMMADEX 200 MG: 100 INJECTION, SOLUTION INTRAVENOUS at 21:11

## 2021-04-25 RX ADMIN — Medication 5 MG: at 20:30

## 2021-04-25 RX ADMIN — FENTANYL CITRATE 50 MCG: 50 INJECTION, SOLUTION INTRAMUSCULAR; INTRAVENOUS at 20:12

## 2021-04-25 NOTE — OR NURSING
Patient's OR time delayed due to emergency case. Patient returned to 6D after 2 hours in PACU/Pre op. Plan to return to PACU when surgeon available.

## 2021-04-25 NOTE — ANESTHESIA PROCEDURE NOTES
Airway       Patient location during procedure: OR  Staff -        CRNA: Aleshia Trammell APRN CRNA       Performed By: CRNA  Consent for Airway        Urgency: elective  Indications and Patient Condition       Indications for airway management: keri-procedural       Induction type:intravenous       Mask difficulty assessment: 1 - vent by mask    Final Airway Details       Final airway type: endotracheal airway       Successful airway: ETT - single  Endotracheal Airway Details        ETT size (mm): 7.0       Cuffed: yes       Successful intubation technique: direct laryngoscopy       DL Blade Type: Hernandez 2       Grade View of Cords: 1       Adjucts: stylet       Position: Right       Measured from: lips       Secured at (cm): 22       Bite block used: None    Post intubation assessment        Placement verified by: capnometry, equal breath sounds and chest rise        Number of attempts at approach: 1       Number of other approaches attempted: 0       Secured with: pink tape       Ease of procedure: easy       Dentition: Intact and Unchanged    Medication(s) Administered   Medication Administration Time: 4/25/2021 5:54 PM

## 2021-04-25 NOTE — PROVIDER NOTIFICATION
Notified Joshua LEONARD of critical lab result - positive blood cultures with gram positive cocci and clusters. Awaiting new orders.    Michelle Mosley RN

## 2021-04-25 NOTE — PROGRESS NOTES
Major Shift Events:  Stable shift. HGB improved overnight. NPO since midnight. No c/o pain. Drainage from incision on R groin. Provider notified and changed ABD.   VSS. Afebrile.  Plan: Return to surgery this afternoon for a skin graft revision. Monitor incision site.  For vital signs and complete assessments, please see documentation flowsheets.

## 2021-04-25 NOTE — CONSULTS
[facility]  Consult Note - Hospitalist Service     Date of Admission:  4/23/2021  Consult Requested by: Dr. Salgado  Reason for Consult: Medical co-management    Assessment & Plan   Maribel Yang is a 70 year old female with a history of ESRD s/p LDKT (2004), HTN, PAD, recent inferior STEMI s/p RCA stent (4/7/21), HFrEF, AAA with prior fem-fem bypass s/p EVAR (4/6/21), SCC of R lung s/p SBRT (2014), anal canal carcinoma s/p chemoradiation (2018). She was admitted to Vascular Surgery service with R groin surgical site infection.     Sepsis 2/2 infection of right femoral groin access site, perigraft abscess s/p I&D (4/24/21)  AAA s/p EVAR  Presented with pain and redness of R groin site, fevers, leukocytosis. Was started on Keflex as outpatient. CT abd/pelvis demonstrated a large fluid collection extending from R groin surgical site and R common femoral artery along the fem-fem bypass graft to L common femoral artery.   - Vascular Surgery planning for return to OR for explantation of fem-fem bypass   - Cardiology already consulted for pre-operative risk assessment, I agree with their findings and do not feel any additional pre-operative work-up is needed at this time.    - Agree with IV Zosyn, vancomycin   - Cultures pending     Recent inferior STEMI s/p LIUDMILA to RCA (4/7/21)  HFrEF  Had post-operative STEMI requiring LIUDMILA to proximal RCA, on DAPT x1 year. Echo 4/23 with EF 35-40%, inferior wall and mid-septal akinesis.    - Cardiology consulted 4/24   - Hold lisinopril (ordered). 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 Follows with Dr. See. Cr ~1.4 - 1.7 recently, currently stable.    - Her prednisone dose places her in the intermediate risk  category for HPA axis suppression. She did not receive stress-dose steroids with her recent EVAR and did not have any apparent issues with adrenal insufficiency. I would hold on empiric stress-dose steroids at this time.    - Recommend Transplant Nephrology consult for immunosuppression management, it is unclear how long she needs to hold sirolimus for.   - Immunosuppression:   - Continue prednisone 5 mg daily   - PTA on Sirolimus, temporarily transitioned to MMF during perioperative period.    - Continue mycophenolate 360 mg BID   - She is not on pneumocystis prophylaxis   - Daily BMP    - Monitor I/Os, daily weights     Hx of DVT: Remote DVT at age 17, had unprovoked RLE DVT in 3/2020. On lifelong Eliquis prophylaxis (2.5 mg BID) per Hematology. Eliquis was discontinued during recent hospitalization.    - Agree with subcutaneous heparin   - She does require indefinite DVT prophylaxis per prior Hematology notes. She will need to discharge on pharmacologic prophylaxis, would ideally resume her Eliquis when appropriate from surgical standpoint.    - When Eliquis is resumed Cardiology previously recommended stopping aspirin.    Acute normocytic anemia: Hgb ~9 since early April, hgb drop most likely due to acute surgical blood loss.    - Monitor CBC, transfuse for hgb <7    Allergic rhinitis: I resumed her PTA Flonase per pt request.     Medicine will continue to follow. Please do not hesitate if any new questions or concerns arise.       Maribell Shanks PA-C  [facility]  Contact information available via [facility] Paging/Directory  Please see sign in/sign out for up to date coverage information  ______________________________________________________________________    Chief Complaint   Wound infection     History is obtained from the patient    History of Present Illness   Maribel Yang is a 70 year old female with a history of ESRD s/p LDKT (2004), HTN, PAD, recent  inferior STEMI s/p RCA stent (4/7/21). HFrEF, AAA with prior fem-fem bypass s/p EVAR (4/6/21). She was admitted to Vascular Surgery service with R groin surgical site infection.     She developed pain and redness of her right groin access site. She was started on PO Keflex by PCP but pain and redness continued to worsen. CT demonstrated large fluid collection in lower abdominal wall extending from R groin surgical site and R common femoral artery along the fem-fem bypass graft.     Currently she is reporting minimal groin pain. She denies any chest pain, dyspnea, cough, leg swelling, abdominal pain.     Review of Systems   The 10 point Review of Systems is negative other than noted in the HPI or here.     Past Medical History    I have reviewed this patient's medical history and updated it with pertinent information if needed.   Past Medical History:   Diagnosis Date     Abnormal coagulation profile     p [id number] heterozygote      Age-related osteoporosis without current pathological fracture 6/22/2019     Anemia      Antiplatelet or antithrombotic long-term use      ASCUS with positive high risk HPV 2007, 2015    + HPV 56, 54,& 6, colp - TAL III, Leep =TAL II     Basal cell carcinoma      Depressive disorder July 2015     Hypertension      Immunosuppressed status (H)     due meds     Kidney replaced by transplant 9/04    Living donor recipient,  Rejection 7/2005     LSIL (low grade squamous intraepithelial lesion) on Pap smear 4/2013    +HPV 33 or 45, 61       PAD (peripheral artery disease) (H)      PONV (postoperative nausea and vomiting)      Squamous cell lung cancer (H)      Thrombosis of leg 1967     Unspecified disorder of kidney and ureter     X-linked dominant Alport's syndrome.       Past Surgical History   I have reviewed this patient's surgical history and updated it with pertinent information if needed.  Past Surgical History:   Procedure Laterality Date     BIOPSY      Kidney, Lung, Breast     BIOPSY  ANAL N/A 3/14/2018    Procedure: BIOPSY ANAL;;  Surgeon: Shabbir Leo MD;  Location: [facility]     C TRANSPLANTATION OF KIDNEY  9/04    recipient -- done at [facility]     COLONOSCOPY       COLONOSCOPY N/A 8/9/2017    Procedure: COMBINED COLONOSCOPY, SINGLE OR MULTIPLE BIOPSY/POLYPECTOMY BY BIOPSY;;  Surgeon: Sushil Hyatt MD;  Location:  GI     COLPOSCOPY,LOOP ELECTRD CERVIX EXCIS  03/11/08    TAL II     CONIZATION LEEP  7/17/2013    Procedure: CONIZATION LEEP;;  Surgeon: Liliana Renteria MD;  Location: [facility] OR     CONIZATION LEEP N/A 8/17/2016    Procedure: CONIZATION LEEP;  Surgeon: Liliana Renteria MD;  Location: [facility] OR     CV CORONARY ANGIOGRAM N/A 4/6/2021    Procedure: CV CORONARY ANGIOGRAM;  Surgeon: Sincere Rosas MD;  Location:  HEART CARDIAC CATH LAB     CV PCI STENT DRUG ELUTING N/A 4/6/2021    Procedure: Percutaneous Coronary Intervention Stent Drug Eluting;  Surgeon: Sincere Rosas MD;  Location:  HEART CARDIAC CATH LAB     ENDOVASCULAR REPAIR ANEURYSM AORTOILIAC Bilateral 4/6/2021    Procedure: Endovascular Abdominal Aortic Aneurysm Repair with Aortouniiliac Device and Femoral-Femoral Artery Bypass with [id number] Artegraft;  Surgeon: Abena Ferrara MD;  [facility]: [facility]     EXAM UNDER ANESTHESIA ANUS  7/15/2014    Procedure: EXAM UNDER ANESTHESIA ANUS;  Surgeon: Radha Musa MD;  Location: [facility] OR     EXAM UNDER ANESTHESIA ANUS N/A 3/14/2018    Procedure: EXAM UNDER ANESTHESIA ANUS;  Anal Exam Under Anesthesia With Excision of anal lesion, proctoscopy;  Surgeon: Shabbir Leo MD;  Location: [facility] OR     EYE SURGERY       GENITOURINARY SURGERY       IR OR ANGIOGRAM  4/6/2021     LASER CO2 EXCISE VULVA WIDE LOCAL  7/15/2014    Procedure: LASER CO2 EXCISE VULVA WIDE LOCAL;  Surgeon: Liliana Renteria MD;  Location: [facility]     LASER CO2 VAGINA  7/17/2013    Procedure: LASER CO2 VAGINA;;  Surgeon: Liliana Renteria MD;  [facility]     LASER CO2  VAGINA N/A 9/25/2018    Procedure: LASER CO2 VAGINA;  Exam Under Anesthesia, CO2 Laser Ablation of Upper Vagina and Cervix;  Surgeon: Pati Garcia MD;  Location: [facility] OR     MICROSCOPY ANAL  7/17/2013    Procedure: MICROSCOPY ANAL;  Anal Microscopy,  EUA vagina,Colposcopy Of Vagina And Vulva, Vaginal Biopsies, Omniguide Co2 Laser To Vagina and vulva, Loop Electrosurgical Excision Procedure To Cervix;  Surgeon: Radha Musa MD;  Location: [facility] OR     MICROSCOPY ANAL  7/15/2014    Procedure: MICROSCOPY ANAL;  Surgeon: Radha Musa MD;  Location: [facility] OR     VASCULAR SURGERY      Thrombectomy     ZZC NONSPECIFIC PROCEDURE      Thrombectomy     ZZC NONSPECIFIC PROCEDURE  1955 and 1959    Bilater eye surgery - correction for crossed eyes     ZZC NONSPECIFIC PROCEDURE  1998    oopherectomy L     ZZC NONSPECIFIC PROCEDURE  1967    open kidney biopsy - L       Social History   I have reviewed this patient's social history and updated it with pertinent information if needed.  Social History     Tobacco Use     Smoking status: Former Smoker     Packs/day: 0.30     Years: 35.00     Pack years: 10.50     Types: Cigarettes     Start date: 1/1/1967     Smokeless tobacco: Never Used     Tobacco comment: Couple times a week. 1-2 cigs 1-2x a week.   Substance Use Topics     Alcohol use: Not Currently     Alcohol/week: 0.0 standard drinks     Frequency: Monthly or less     Drinks per session: 1 or 2     Binge frequency: Less than monthly     Comment: rarely     Drug use: Not Currently     Types: Marijuana       Family History   I have reviewed this patient's family history and updated it with pertinent information if needed.  Family History   Problem Relation Age of Onset     Diabetes Father         type 2 diag age,60's     Alcohol/Drug Father      Arthritis Father      Hypertension Father      Lipids Father         high cholesterol     Arthritis Mother      Diabetes Mother      Depression Mother      Heart  Disease Mother      Neurologic Disorder Mother      Obesity Mother      Psychotic Disorder Mother      Thyroid Disease Mother      Hypertension Mother      Gynecology Sister         Precancerous cell removal from cervix at age 45     Depression Sister      Allergies Sister      Alcohol/Drug Sister      Neurologic Disorder Sister      Cerebrovascular Disease Paternal Grandmother          of a stroke in her 80's     Diabetes Paternal Grandmother      Alcohol/Drug Son      Colon Polyps Sister      Breast Cancer Niece      Other Cancer Sister         Cervical     Obesity Sister      Depression Sister      Substance Abuse Son      Substance Abuse Sister      Asthma Other      Colon Cancer No family hx of      Crohn's Disease No family hx of      Ulcerative Colitis No family hx of      Melanoma No family hx of      Skin Cancer No family hx of        Medications   Medications Prior to Admission   Medication Sig Dispense Refill Last Dose     acetaminophen (TYLENOL) 325 MG tablet Take 2 tablets (650 mg) by mouth every 4 hours as needed for other (For optimal non-opioid multimodal pain management to improve pain control.)        amoxicillin (AMOXIL) 500 MG capsule Take 4 capsules (2,000 mg) by mouth once as needed Prior to dental procedures 16 capsule 3      aspirin (ASA) 81 MG chewable tablet Take 1 tablet (81 mg) by mouth daily        atorvastatin (LIPITOR) 80 MG tablet Take 1 tablet (80 mg) by mouth daily 60 tablet 4      calcium carbonate 600 mg-vitamin D 400 units (CALTRATE) 600-400 MG-UNIT per tablet Take 1 tablet by mouth 2 times daily        Lactobacillus-Inulin ([facility] DIGESTIVE HEALTH) CAPS TAKE ONE CAPSULE BY MOUTH EVERY DAY (DUE FOR PHYSICAL IN MARCH) 90 capsule 3      lidocaine (LIDODERM) 5 % patch Place 1 patch onto the skin every 24 hours To prevent lidocaine toxicity, patient should be patch free for 12 hrs daily.    Apply to low back as needed for back pain 5 patch 0      lisinopril (ZESTRIL) 5 MG  tablet Take 1 tablet (5 mg) by mouth daily 60 tablet 4      metoprolol tartrate (LOPRESSOR) 100 MG tablet Take 1 tablet (100 mg) by mouth 2 times daily 180 tablet 1      mycophenolic acid (GENERIC EQUIVALENT) 360 MG EC tablet Take 1 tablet (360 mg) by mouth 2 times daily 60 tablet 1      oxyCODONE (ROXICODONE) 5 MG tablet Take 1 tablet (5 mg) by mouth every 6 hours as needed for moderate to severe pain 12 tablet 0      predniSONE (DELTASONE) 5 MG tablet Take 1 tablet (5 mg) by mouth daily 90 tablet 0      ticagrelor (BRILINTA) 90 MG tablet Take 1 tablet (90 mg) by mouth every 12 hours 60 tablet 4        Allergies   Allergies   Allergen Reactions     Blood Transfusion Related (Informational Only) Other (See Comments)     Patient has a history of a clinically significant antibody against RBC antigens.  A delay in compatible RBCs may occur.     Ultracet Nausea and Vomiting and Hives     Hydrocodone Nausea and Vomiting and Hives       Physical Exam   Vital Signs: Temp: 97.7  F (36.5  C) Temp src: Axillary BP: (!) 168/89 Pulse: 56   Resp: 21 SpO2: 94 % O2 Device: Nasal cannula Oxygen Delivery: 2 LPM  Weight: 101 lbs 14.4 oz    Constitutional: Awake and alert, in no apparent distress. Lying comfortably in bed.   Eyes: Sclera clear, anicteric   Respiratory: Breathing non-labored. CTAB with no crackles or wheezing.   Cardiovascular:  RRR, normal S1/S2. No rubs or murmurs. Intact bilateral pedal pulses. No peripheral edema.   GI: Soft, non-tender, non-distended. Normoactive bowel sounds.   Skin: Good color. No jaundice.   Neurologic: Alert and oriented. Facies symmetric. Speech clear. No focal deficits.       Data   ROUTINE IP LABS (Last four results)  Recent Labs   Lab 04/25/21  0511 04/24/21  1313 04/23/21  1935    132* 132*   POTASSIUM 4.1 5.3 4.4   CHLORIDE 109  --  102   CO2 19*  --  21   ANIONGAP 9  --  9   * 99 112*   BUN 33*  --  36*   CR 1.46*  --  1.63*   KAKYAY 8.5  --  9.2   PROTTOTAL  --   --  6.1*    ALBUMIN  --   --  2.0*   BILITOTAL  --   --  0.5   ALKPHOS  --   --  169*   AST  --   --  16   ALT  --   --  15     Recent Labs   Lab 04/25/21  0511 04/24/21  2200 04/24/21  1453 04/24/21  1313 04/23/21  1935   WBC 12.8*  --   --   --  13.4*   RBC 3.30*  --   --   --  3.21*   HGB 9.4* 9.1* 7.8* 8.5* 9.1*   HCT 29.2*  --   --   --  29.5*   MCV 89  --   --   --  92   MCH 28.5  --   --   --  28.3   MCHC 32.2  --   --   --  30.8*   RDW 15.4*  --   --   --  15.8*     --   --   --  529*     No lab results found in last 7 days.     Glucose Values Latest Ref Rng & Units 4/9/2021 4/10/2021 4/13/2021 4/23/2021 4/24/2021 4/24/2021 4/25/2021   Bedside Glucose (mg/dl )  - -- -- -- -- -- -- --   GLUCOSE 70 - 99 mg/dL 97 91 112(H) 112(H) 81 99 108(H)   Some recent data might be hidden

## 2021-04-25 NOTE — PLAN OF CARE
Major Shift Events:  Pt NPO for OR today. Adequate urine output. Arterial line BP's 150's-160's. HR 60's-70's. No BM. Pt afebrile. Sats >94% on 2LNC. Lactic acid 0.8. Blood cultures positive for gram positive cocci and MD renetta notified. Pt down to OR at 1700.    Please call diana Coates with updates when pt is back to room.    Plan: Post op cares.    For vital signs and complete assessments, please see documentation flowsheets.

## 2021-04-25 NOTE — PROGRESS NOTES
"VASCULAR SURGERY PROGRESS NOTE    Subjective:  Sitting comfortably in bed in no acute distress.     Objective:No intake or output data in the 24 hours ending 04/24/21 1108  PHYSICAL EXAM:  BP (!) 168/89 (BP Location: Right arm, Cuff Size: Adult Small)   Pulse 60   Temp 97.7  F (36.5  C) (Axillary)   Resp 28   Ht 1.575 m (5' 2\")   Wt 46.2 kg (101 lb 14.4 oz)   SpO2 94%   BMI 18.64 kg/m    General: The patient is alert and oriented. Appropriate. No acute distress  Psych: pleasant affect, answers questions appropriately  Respiratory: The patient does not require supplemental oxygen. Breathing unlabored  GI:  Abdomen soft, nontender to light palpation.  Extremities: Open right groin wound; exposed graft. Packed with Adaptic, moistened Kerlix gauze, and ABD pad. Dressing changed this AM.       Imaging:   Reviewed CT scan.    ASSESSMENT:  69 yo F s/p EVAR AUI and left to right femoral to femoral bypass graft for 5.9 cm AAA. Initial post-op course was complicated by inferior STEMI requiring emergent cardiac catheterization and PCI to RCA. Patient now presenting with subjective fevers and chills and erythema and drainage from right groin wound.       PLAN:  -  OR today for explantation of previous left to right femoral to femoral bypass Artegraft and replacement with cryoartery; sartorius muscle flap.  - NPO; mIVF; Brilinta dose this morning on hold   - Aspirin, atorvastatin; metoprolol, lisinopril   - Continue broad spectrum antibiotics  - IMU    Discussed pt history, exam, assessment and plan with Dr. Lewis of the vascular surgery service, who is in agreement with the above.    Devendra Salgado MD  Division of Vascular Surgery   Pager: [phone]                    "

## 2021-04-25 NOTE — ANESTHESIA PREPROCEDURE EVALUATION
Anesthesia Pre-Procedure Evaluation    Patient: Maribel Yang   MRN: [id number] : 1950        Preoperative Diagnosis: Wound infection [T14.8XXA, L08.9]   Procedure : Procedure(s):  IRRIGATION AND DEBRIDEMENT, INGUINAL REGION, I & D OF RIGHT GROIN, SATORIUS MUSCLE FLAP CREATION     Past Medical History:   Diagnosis Date     Abnormal coagulation profile     p [id number]>A heterozygote      Age-related osteoporosis without current pathological fracture 2019     Anemia      Antiplatelet or antithrombotic long-term use      ASCUS with positive high risk HPV 2015    + HPV 56, 54,& 6, colp - TAL III, Leep =TAL II     Basal cell carcinoma      Depressive disorder 2015     Hypertension      Immunosuppressed status (H)     due meds     Kidney replaced by transplant     Living donor recipient,  Rejection 2005     LSIL (low grade squamous intraepithelial lesion) on Pap smear 2013    +HPV 33 or 45, 61       PAD (peripheral artery disease) (H)      PONV (postoperative nausea and vomiting)      Squamous cell lung cancer (H)      Thrombosis of leg 1967     Unspecified disorder of kidney and ureter     X-linked dominant Alport's syndrome.      Past Surgical History:   Procedure Laterality Date     BIOPSY      Kidney, Lung, Breast     BIOPSY ANAL N/A 3/14/2018    Procedure: BIOPSY ANAL;;  Surgeon: Shabbir Leo MD;  Location:  OR      TRANSPLANTATION OF KIDNEY      recipient -- done at [facility]     COLONOSCOPY       COLONOSCOPY N/A 2017    Procedure: COMBINED COLONOSCOPY, SINGLE OR MULTIPLE BIOPSY/POLYPECTOMY BY BIOPSY;;  Surgeon: Sushil Hyatt MD;  Location:  GI     COLPOSCOPY,LOOP ELECTRD CERVIX EXCIS  08    TAL II     CONIZATION LEEP  2013    Procedure: CONIZATION LEEP;;  Surgeon: Liliana Renteria MD;  Location:  OR     CONIZATION LEEP N/A 2016    Procedure: CONIZATION LEEP;  Surgeon: Liliana Renteria MD;  Location:  OR     CV CORONARY ANGIOGRAM  N/A 4/6/2021    Procedure: CV CORONARY ANGIOGRAM;  Surgeon: Sincere Rosas MD;  Location:  HEART CARDIAC CATH LAB     CV PCI STENT DRUG ELUTING N/A 4/6/2021    Procedure: Percutaneous Coronary Intervention Stent Drug Eluting;  Surgeon: Sincere Rosas MD;  Location:  HEART CARDIAC CATH LAB     ENDOVASCULAR REPAIR ANEURYSM AORTOILIAC Bilateral 4/6/2021    Procedure: Endovascular Abdominal Aortic Aneurysm Repair with Aortouniiliac Device and Femoral-Femoral Artery Bypass with [id number] Artegraft;  Surgeon: Abena Ferrara MD;  Location: [facility] OR     EXAM UNDER ANESTHESIA ANUS  7/15/2014    Procedure: EXAM UNDER ANESTHESIA ANUS;  Surgeon: Radha Musa MD;  Location: [facility] OR     EXAM UNDER ANESTHESIA ANUS N/A 3/14/2018    Procedure: EXAM UNDER ANESTHESIA ANUS;  Anal Exam Under Anesthesia With Excision of anal lesion, proctoscopy;  Surgeon: Shabbir Leo MD;  Location: [facility] OR     EYE SURGERY       GENITOURINARY SURGERY       IR OR ANGIOGRAM  4/6/2021     LASER CO2 EXCISE VULVA WIDE LOCAL  7/15/2014    Procedure: LASER CO2 EXCISE VULVA WIDE LOCAL;  Surgeon: Liliana Renteria MD;  Location: [facility] OR     LASER CO2 VAGINA  7/17/2013    Procedure: LASER CO2 VAGINA;;  Surgeon: Liliana Renteria MD;  Location: [facility] OR     LASER CO2 VAGINA N/A 9/25/2018    Procedure: LASER CO2 VAGINA;  Exam Under Anesthesia, CO2 Laser Ablation of Upper Vagina and Cervix;  Surgeon: Pati Garcia MD;  Location: [facility] OR     MICROSCOPY ANAL  7/17/2013    Procedure: MICROSCOPY ANAL;  Anal Microscopy,  EUA vagina,Colposcopy Of Vagina And Vulva, Vaginal Biopsies, Omniguide Co2 Laser To Vagina and vulva, Loop Electrosurgical Excision Procedure To Cervix;  Surgeon: Radha Musa MD;  Location: [facility] OR     MICROSCOPY ANAL  7/15/2014    Procedure: MICROSCOPY ANAL;  Surgeon: Radha Musa MD;  Location: [facility] OR     VASCULAR SURGERY      Thrombectomy     ZZC NONSPECIFIC PROCEDURE       Thrombectomy     [facility] NONSPECIFIC PROCEDURE  1955 and 1959    Bilater eye surgery - correction for crossed eyes     Z NONSPECIFIC PROCEDURE  1998    oopherectomy L     ZZ NONSPECIFIC PROCEDURE  1967    open kidney biopsy - L      Allergies   Allergen Reactions     Blood Transfusion Related (Informational Only) Other (See Comments)     Patient has a history of a clinically significant antibody against RBC antigens.  A delay in compatible RBCs may occur.     Ultracet Nausea and Vomiting and Hives     Hydrocodone Nausea and Vomiting and Hives      Social History     Tobacco Use     Smoking status: Former Smoker     Packs/day: 0.30     Years: 35.00     Pack years: 10.50     Types: Cigarettes     Start date: 1/1/1967     Smokeless tobacco: Never Used     Tobacco comment: Couple times a week. 1-2 cigs 1-2x a week.   Substance Use Topics     Alcohol use: Not Currently     Alcohol/week: 0.0 standard drinks     Frequency: Monthly or less     Drinks per session: 1 or 2     Binge frequency: Less than monthly     Comment: rarely      Wt Readings from Last 1 Encounters:   04/23/21 46.2 kg (101 lb 14.4 oz)        Anesthesia Evaluation   Pt has had prior anesthetic. Type: General and MAC.    History of anesthetic complications  - PONV.      ROS/MED HX  ENT/Pulmonary: Comment: 10.5 pack year smoking history    (+) tobacco use, Past use,     Neurologic:  - neg neurologic ROS     Cardiovascular: Comment: Claudication, on Petal and Plavix    (+) Dyslipidemia hypertension-range: 140s/60-80s/ Peripheral Vascular Disease-- Other. CAD (Nonobstructive ) --stent-4/6/21. 1 Drug Eluting Stent. Previous cardiac testing   Echo: Date: 4/24/21 Results:  Moderately (EF 35-40%) reduced left ventricular function is present. There is  thinning and akinesis of the mid septal segment. Inferior wall akinesis is  present. Apical wall akinesis is present.  Trivial pericardial effusion is present.  Stress Test: Date: 2014 Results:    ECG Reviewed:  Date:  3/2018 Results:  SB  Cath:  Date: 2004 Results:  Nonobstructive CAD    METS/Exercise Tolerance: 3 - Able to walk 1-2 blocks without stopping    Hematologic:  - neg hematologic  ROS   (+) History of blood clots, pt is not anticoagulated,     Musculoskeletal:  - neg musculoskeletal ROS     GI/Hepatic:       Renal/Genitourinary: Comment: Alport disease    (+) renal disease, type: ESRD, Pt does not require dialysis, Pt has history of transplant, date: 2004,     Endo:     (+) Chronic steroid usage for Post Transplant Immunosuppression.     Psychiatric/Substance Use:  - neg psychiatric ROS  (-) chronic opioid use history   Infectious Disease:  - neg infectious disease ROS     Malignancy:   (+) Malignancy, History of Lung, Other and Skin.  Lung CA Remission status post Radiation.  Skin CA Remission status post Surgery.  Other CA anal dysplasia, TAL III Active status post Surgery.    Other:      (+) , no H/O Chronic Pain, (-) Any chance pregnant and Other Significant Disability          OUTSIDE LABS:  CBC:   Lab Results   Component Value Date    WBC 13.4 (H) 04/23/2021    WBC 10.3 04/09/2021    HGB 7.8 (L) 04/24/2021    HGB 8.5 (L) 04/24/2021    HCT 29.5 (L) 04/23/2021    HCT 27.5 (L) 04/09/2021     (H) 04/23/2021    PLT 97 (L) 04/09/2021     BMP:   Lab Results   Component Value Date     (L) 04/24/2021     (L) 04/23/2021    POTASSIUM 5.3 04/24/2021    POTASSIUM 4.4 04/23/2021    CHLORIDE 102 04/23/2021    CHLORIDE 105 04/13/2021    CO2 21 04/23/2021    CO2 24 04/13/2021    BUN 36 (H) 04/23/2021    BUN 31 (H) 04/13/2021    CR 1.63 (H) 04/23/2021    CR 1.60 (H) 04/13/2021    GLC 99 04/24/2021     (H) 04/23/2021     COAGS:   Lab Results   Component Value Date    PTT 28 07/22/2014    INR 0.94 07/22/2014     POC:   Lab Results   Component Value Date    BGM 81 04/24/2021    HCG Negative 09/22/2008     HEPATIC:   Lab Results   Component Value Date    ALBUMIN 2.0 (L) 04/23/2021    PROTTOTAL 6.1 (L)  04/23/2021    ALT 15 04/23/2021    AST 16 04/23/2021    ALKPHOS 169 (H) 04/23/2021    BILITOTAL 0.5 04/23/2021    BILIDIRECT .0@ 03/10/2005     OTHER:   Lab Results   Component Value Date    PH 7.46 (H) 04/24/2021    LACT 0.6 (L) 04/24/2021    A1C 5.6 04/08/2021    KAYKAY 9.2 04/23/2021    PHOS 2.8 08/07/2019    MAG 2.2 04/26/2019    LIPASE 56 10/23/2009    TSH 1.97 04/08/2021    SED 16 06/12/2019       Anesthesia Plan    ASA Status:  4      Anesthesia Type: General.     - Airway: ETT   Induction: Intravenous.   Maintenance: Balanced.   Techniques and Equipment:     - Lines/Monitors: 2nd IV, Arterial Line, Central Line, BIS     - Blood: Blood in Room     - Drips/Meds: Phenylephrine, Norepi     Consents    Anesthesia Plan(s) and associated risks, benefits, and realistic alternatives discussed. Questions answered and patient/representative(s) expressed understanding.     - Discussed with:  Patient         Postoperative Care    Pain management: IV analgesics.   PONV prophylaxis: Ondansetron (or other 5HT-3), Dexamethasone or Solumedrol     Comments:    69 yo woman with 5.9 cm infrarenal abdominal aortic aneurysm and 4.9 cm thoracic aortic aneurysm, as well as kidney transplant and history of lung and anal cancer (both in remission). She underwent endovascular repair of the abdominal aortic aneurysm with aortio uniiliac EVAR and fem-fem bypass with plugging of right common iliac artery on 04/06. She suffered an inferior MI immediately post-op, and underwent LIUDMILA placement of the proximal RCA. She developed erythema and drainage of the right groin incision several days ago. She was started on keflex at that time, however her symptoms persisted and she had a temperature of 103.6 F today. She was brought in to the ED by EMS for evaluation. She underwent an I &D of the groin after evaluation by cardiology with repeat TTE. ROLY showed Moderately (EF 35-40%) reduced left ventricular function is present. There is  thinning and  akinesis of the mid septal segment. Inferior wall akinesis is  present. Apical wall akinesis is present.  Trivial pericardial effusion is present.     This study was compared with the study from 4/7/2021. No significant changes  noted.    Plan to return to OR for : R groin incision and drainage, sartorius muscle flap, possible femoral to femoral bypass graft explantation with revision with cadaveric homograft            Luis Alfredo Duong MD

## 2021-04-26 ENCOUNTER — ANESTHESIA EVENT (OUTPATIENT)
Dept: SURGERY | Facility: CLINIC | Age: 71
DRG: 252 | End: 2021-04-26
Payer: COMMERCIAL

## 2021-04-26 ENCOUNTER — TELEPHONE (OUTPATIENT)
Dept: FAMILY MEDICINE | Facility: CLINIC | Age: 71
End: 2021-04-26

## 2021-04-26 ENCOUNTER — ANESTHESIA (OUTPATIENT)
Dept: SURGERY | Facility: CLINIC | Age: 71
DRG: 252 | End: 2021-04-26
Payer: COMMERCIAL

## 2021-04-26 ENCOUNTER — APPOINTMENT (OUTPATIENT)
Dept: CARDIOLOGY | Facility: CLINIC | Age: 71
DRG: 252 | End: 2021-04-26
Attending: STUDENT IN AN ORGANIZED HEALTH CARE EDUCATION/TRAINING PROGRAM
Payer: COMMERCIAL

## 2021-04-26 LAB
ANION GAP SERPL CALCULATED.3IONS-SCNC: 10 MMOL/L (ref 3–14)
ANION GAP SERPL CALCULATED.3IONS-SCNC: 11 MMOL/L (ref 3–14)
ANION GAP SERPL CALCULATED.3IONS-SCNC: 8 MMOL/L (ref 3–14)
ANION GAP SERPL CALCULATED.3IONS-SCNC: 9 MMOL/L (ref 3–14)
BLD PROD TYP BPU: NORMAL
BLD UNIT ID BPU: 0
BLOOD PRODUCT CODE: NORMAL
BPU ID: NORMAL
BUN SERPL-MCNC: 33 MG/DL (ref 7–30)
BUN SERPL-MCNC: 38 MG/DL (ref 7–30)
BUN SERPL-MCNC: 38 MG/DL (ref 7–30)
BUN SERPL-MCNC: 41 MG/DL (ref 7–30)
CALCIUM SERPL-MCNC: 8.3 MG/DL (ref 8.5–10.1)
CALCIUM SERPL-MCNC: 8.4 MG/DL (ref 8.5–10.1)
CALCIUM SERPL-MCNC: 8.4 MG/DL (ref 8.5–10.1)
CALCIUM SERPL-MCNC: 8.8 MG/DL (ref 8.5–10.1)
CHLORIDE SERPL-SCNC: 108 MMOL/L (ref 94–109)
CHLORIDE SERPL-SCNC: 111 MMOL/L (ref 94–109)
CHLORIDE SERPL-SCNC: 111 MMOL/L (ref 94–109)
CHLORIDE SERPL-SCNC: 112 MMOL/L (ref 94–109)
CO2 SERPL-SCNC: 18 MMOL/L (ref 20–32)
CO2 SERPL-SCNC: 18 MMOL/L (ref 20–32)
CO2 SERPL-SCNC: 19 MMOL/L (ref 20–32)
CO2 SERPL-SCNC: 20 MMOL/L (ref 20–32)
CREAT SERPL-MCNC: 1.35 MG/DL (ref 0.52–1.04)
CREAT SERPL-MCNC: 1.51 MG/DL (ref 0.52–1.04)
CREAT SERPL-MCNC: 1.53 MG/DL (ref 0.52–1.04)
CREAT SERPL-MCNC: 1.58 MG/DL (ref 0.52–1.04)
ERYTHROCYTE [DISTWIDTH] IN BLOOD BY AUTOMATED COUNT: 15.9 % (ref 10–15)
ERYTHROCYTE [DISTWIDTH] IN BLOOD BY AUTOMATED COUNT: 15.9 % (ref 10–15)
ERYTHROCYTE [DISTWIDTH] IN BLOOD BY AUTOMATED COUNT: 16.1 % (ref 10–15)
ERYTHROCYTE [DISTWIDTH] IN BLOOD BY AUTOMATED COUNT: 17.6 % (ref 10–15)
GFR SERPL CREATININE-BSD FRML MDRD: 33 ML/MIN/{1.73_M2}
GFR SERPL CREATININE-BSD FRML MDRD: 35 ML/MIN/{1.73_M2}
GFR SERPL CREATININE-BSD FRML MDRD: 35 ML/MIN/{1.73_M2}
GFR SERPL CREATININE-BSD FRML MDRD: 40 ML/MIN/{1.73_M2}
GLUCOSE BLDC GLUCOMTR-MCNC: 127 MG/DL (ref 70–99)
GLUCOSE SERPL-MCNC: 136 MG/DL (ref 70–99)
GLUCOSE SERPL-MCNC: 138 MG/DL (ref 70–99)
GLUCOSE SERPL-MCNC: 143 MG/DL (ref 70–99)
GLUCOSE SERPL-MCNC: 146 MG/DL (ref 70–99)
HCT VFR BLD AUTO: 25.3 % (ref 35–47)
HCT VFR BLD AUTO: 26.2 % (ref 35–47)
HCT VFR BLD AUTO: 26.5 % (ref 35–47)
HCT VFR BLD AUTO: 31.4 % (ref 35–47)
HGB BLD-MCNC: 8.2 G/DL (ref 11.7–15.7)
HGB BLD-MCNC: 8.3 G/DL (ref 11.7–15.7)
HGB BLD-MCNC: 8.5 G/DL (ref 11.7–15.7)
INTERPRETATION ECG - MUSE: NORMAL
MAGNESIUM SERPL-MCNC: 1.9 MG/DL (ref 1.6–2.3)
MAGNESIUM SERPL-MCNC: 2 MG/DL (ref 1.6–2.3)
MAGNESIUM SERPL-MCNC: 2 MG/DL (ref 1.6–2.3)
MCH RBC QN AUTO: 27.7 PG (ref 26.5–33)
MCH RBC QN AUTO: 28.5 PG (ref 26.5–33)
MCH RBC QN AUTO: 29.1 PG (ref 26.5–33)
MCH RBC QN AUTO: 29.1 PG (ref 26.5–33)
MCHC RBC AUTO-ENTMCNC: 31.7 G/DL (ref 31.5–36.5)
MCHC RBC AUTO-ENTMCNC: 32.1 G/DL (ref 31.5–36.5)
MCHC RBC AUTO-ENTMCNC: 32.4 G/DL (ref 31.5–36.5)
MCHC RBC AUTO-ENTMCNC: 32.6 G/DL (ref 31.5–36.5)
MCV RBC AUTO: 86 FL (ref 78–100)
MCV RBC AUTO: 90 FL (ref 78–100)
MCV RBC AUTO: 90 FL (ref 78–100)
MCV RBC AUTO: 92 FL (ref 78–100)
PHOSPHATE SERPL-MCNC: 4.1 MG/DL (ref 2.5–4.5)
PHOSPHATE SERPL-MCNC: 4.5 MG/DL (ref 2.5–4.5)
PHOSPHATE SERPL-MCNC: 4.6 MG/DL (ref 2.5–4.5)
PLATELET # BLD AUTO: 391 10E9/L (ref 150–450)
PLATELET # BLD AUTO: 434 10E9/L (ref 150–450)
PLATELET # BLD AUTO: 454 10E9/L (ref 150–450)
PLATELET # BLD AUTO: 544 10E9/L (ref 150–450)
POTASSIUM SERPL-SCNC: 4.1 MMOL/L (ref 3.4–5.3)
POTASSIUM SERPL-SCNC: 4.2 MMOL/L (ref 3.4–5.3)
POTASSIUM SERPL-SCNC: 4.4 MMOL/L (ref 3.4–5.3)
POTASSIUM SERPL-SCNC: 4.5 MMOL/L (ref 3.4–5.3)
RBC # BLD AUTO: 2.82 10E12/L (ref 3.8–5.2)
RBC # BLD AUTO: 2.91 10E12/L (ref 3.8–5.2)
RBC # BLD AUTO: 3.07 10E12/L (ref 3.8–5.2)
RBC # BLD AUTO: 3.4 10E12/L (ref 3.8–5.2)
SODIUM SERPL-SCNC: 136 MMOL/L (ref 133–144)
SODIUM SERPL-SCNC: 138 MMOL/L (ref 133–144)
SODIUM SERPL-SCNC: 139 MMOL/L (ref 133–144)
SODIUM SERPL-SCNC: 140 MMOL/L (ref 133–144)
TRANSFUSION STATUS PATIENT QL: NORMAL
TRANSFUSION STATUS PATIENT QL: NORMAL
TROPONIN I SERPL-MCNC: 20.97 UG/L (ref 0–0.04)
TROPONIN I SERPL-MCNC: 29.52 UG/L (ref 0–0.04)
WBC # BLD AUTO: 13.4 10E9/L (ref 4–11)
WBC # BLD AUTO: 14.2 10E9/L (ref 4–11)
WBC # BLD AUTO: 14.7 10E9/L (ref 4–11)
WBC # BLD AUTO: 15.1 10E9/L (ref 4–11)

## 2021-04-26 PROCEDURE — 93308 TTE F-UP OR LMTD: CPT | Mod: 26 | Performed by: INTERNAL MEDICINE

## 2021-04-26 PROCEDURE — 250N000013 HC RX MED GY IP 250 OP 250 PS 637: Performed by: STUDENT IN AN ORGANIZED HEALTH CARE EDUCATION/TRAINING PROGRAM

## 2021-04-26 PROCEDURE — 87040 BLOOD CULTURE FOR BACTERIA: CPT | Performed by: DIETITIAN, REGISTERED

## 2021-04-26 PROCEDURE — 250N000013 HC RX MED GY IP 250 OP 250 PS 637: Performed by: SURGERY

## 2021-04-26 PROCEDURE — 272N000002 HC OR SUPPLY OTHER OPNP: Performed by: SURGERY

## 2021-04-26 PROCEDURE — 84484 ASSAY OF TROPONIN QUANT: CPT | Performed by: SURGERY

## 2021-04-26 PROCEDURE — 85027 COMPLETE CBC AUTOMATED: CPT | Performed by: SURGERY

## 2021-04-26 PROCEDURE — 250N000011 HC RX IP 250 OP 636: Performed by: SURGERY

## 2021-04-26 PROCEDURE — 85027 COMPLETE CBC AUTOMATED: CPT | Performed by: STUDENT IN AN ORGANIZED HEALTH CARE EDUCATION/TRAINING PROGRAM

## 2021-04-26 PROCEDURE — 83735 ASSAY OF MAGNESIUM: CPT | Performed by: SURGERY

## 2021-04-26 PROCEDURE — 258N000003 HC RX IP 258 OP 636: Performed by: SURGERY

## 2021-04-26 PROCEDURE — 255N000002 HC RX 255 OP 636: Performed by: INTERNAL MEDICINE

## 2021-04-26 PROCEDURE — 84100 ASSAY OF PHOSPHORUS: CPT | Performed by: SURGERY

## 2021-04-26 PROCEDURE — 0JDC0ZZ EXTRACTION OF PELVIC REGION SUBCUTANEOUS TISSUE AND FASCIA, OPEN APPROACH: ICD-10-PCS | Performed by: SURGERY

## 2021-04-26 PROCEDURE — 87077 CULTURE AEROBIC IDENTIFY: CPT | Performed by: DIETITIAN, REGISTERED

## 2021-04-26 PROCEDURE — 93005 ELECTROCARDIOGRAM TRACING: CPT

## 2021-04-26 PROCEDURE — 87799 DETECT AGENT NOS DNA QUANT: CPT | Performed by: SURGERY

## 2021-04-26 PROCEDURE — 93325 DOPPLER ECHO COLOR FLOW MAPG: CPT | Mod: 26 | Performed by: INTERNAL MEDICINE

## 2021-04-26 PROCEDURE — 999N000127 HC STATISTIC PERIPHERAL IV START W US GUIDANCE

## 2021-04-26 PROCEDURE — 250N000012 HC RX MED GY IP 250 OP 636 PS 637: Performed by: SURGERY

## 2021-04-26 PROCEDURE — 80048 BASIC METABOLIC PNL TOTAL CA: CPT | Performed by: SURGERY

## 2021-04-26 PROCEDURE — 200N000002 HC R&B ICU UMMC

## 2021-04-26 PROCEDURE — 999N001017 HC STATISTIC GLUCOSE BY METER IP

## 2021-04-26 PROCEDURE — 36415 COLL VENOUS BLD VENIPUNCTURE: CPT | Performed by: DIETITIAN, REGISTERED

## 2021-04-26 PROCEDURE — 93321 DOPPLER ECHO F-UP/LMTD STD: CPT | Mod: 26 | Performed by: INTERNAL MEDICINE

## 2021-04-26 PROCEDURE — 93010 ELECTROCARDIOGRAM REPORT: CPT | Mod: 59 | Performed by: INTERNAL MEDICINE

## 2021-04-26 PROCEDURE — 80048 BASIC METABOLIC PNL TOTAL CA: CPT | Performed by: STUDENT IN AN ORGANIZED HEALTH CARE EDUCATION/TRAINING PROGRAM

## 2021-04-26 PROCEDURE — 272N000001 HC OR GENERAL SUPPLY STERILE: Performed by: SURGERY

## 2021-04-26 PROCEDURE — 99233 SBSQ HOSP IP/OBS HIGH 50: CPT | Mod: 25 | Performed by: INTERNAL MEDICINE

## 2021-04-26 PROCEDURE — 99223 1ST HOSP IP/OBS HIGH 75: CPT | Performed by: STUDENT IN AN ORGANIZED HEALTH CARE EDUCATION/TRAINING PROGRAM

## 2021-04-26 PROCEDURE — 370N000017 HC ANESTHESIA TECHNICAL FEE, PER MIN: Performed by: SURGERY

## 2021-04-26 PROCEDURE — 250N000013 HC RX MED GY IP 250 OP 250 PS 637: Performed by: DIETITIAN, REGISTERED

## 2021-04-26 PROCEDURE — 360N000075 HC SURGERY LEVEL 2, PER MIN: Performed by: SURGERY

## 2021-04-26 PROCEDURE — 99222 1ST HOSP IP/OBS MODERATE 55: CPT | Mod: GC | Performed by: INTERNAL MEDICINE

## 2021-04-26 PROCEDURE — P9016 RBC LEUKOCYTES REDUCED: HCPCS | Performed by: EMERGENCY MEDICINE

## 2021-04-26 PROCEDURE — 250N000011 HC RX IP 250 OP 636: Performed by: NURSE ANESTHETIST, CERTIFIED REGISTERED

## 2021-04-26 PROCEDURE — 93321 DOPPLER ECHO F-UP/LMTD STD: CPT

## 2021-04-26 PROCEDURE — G0463 HOSPITAL OUTPT CLINIC VISIT: HCPCS

## 2021-04-26 PROCEDURE — 99291 CRITICAL CARE FIRST HOUR: CPT | Performed by: ANESTHESIOLOGY

## 2021-04-26 RX ORDER — DILTIAZEM HYDROCHLORIDE 30 MG/1
30 TABLET, FILM COATED ORAL EVERY 6 HOURS PRN
Status: DISCONTINUED | OUTPATIENT
Start: 2021-04-26 | End: 2021-04-26

## 2021-04-26 RX ORDER — LANOLIN ALCOHOL/MO/W.PET/CERES
6 CREAM (GRAM) TOPICAL AT BEDTIME
Status: DISCONTINUED | OUTPATIENT
Start: 2021-04-26 | End: 2021-05-02 | Stop reason: HOSPADM

## 2021-04-26 RX ORDER — FENTANYL CITRATE 50 UG/ML
INJECTION, SOLUTION INTRAMUSCULAR; INTRAVENOUS PRN
Status: DISCONTINUED | OUTPATIENT
Start: 2021-04-26 | End: 2021-04-26

## 2021-04-26 RX ORDER — PROPOFOL 10 MG/ML
INJECTION, EMULSION INTRAVENOUS CONTINUOUS PRN
Status: DISCONTINUED | OUTPATIENT
Start: 2021-04-26 | End: 2021-04-26

## 2021-04-26 RX ORDER — POLYETHYLENE GLYCOL 3350 17 G/17G
17 POWDER, FOR SOLUTION ORAL ONCE
Status: COMPLETED | OUTPATIENT
Start: 2021-04-26 | End: 2021-04-26

## 2021-04-26 RX ORDER — MAGNESIUM SULFATE HEPTAHYDRATE 40 MG/ML
2 INJECTION, SOLUTION INTRAVENOUS ONCE
Status: COMPLETED | OUTPATIENT
Start: 2021-04-26 | End: 2021-04-26

## 2021-04-26 RX ORDER — POLYETHYLENE GLYCOL 3350 17 G/17G
17 POWDER, FOR SOLUTION ORAL DAILY
Status: DISCONTINUED | OUTPATIENT
Start: 2021-04-26 | End: 2021-04-28

## 2021-04-26 RX ORDER — ISOSORBIDE MONONITRATE 30 MG/1
30 TABLET, EXTENDED RELEASE ORAL DAILY
Status: DISCONTINUED | OUTPATIENT
Start: 2021-04-26 | End: 2021-04-27

## 2021-04-26 RX ORDER — HEPARIN SODIUM,PORCINE 10 UNIT/ML
2-5 VIAL (ML) INTRAVENOUS
Status: ACTIVE | OUTPATIENT
Start: 2021-04-26 | End: 2021-04-29

## 2021-04-26 RX ORDER — SODIUM CHLORIDE 9 MG/ML
INJECTION, SOLUTION INTRAVENOUS CONTINUOUS
Status: DISCONTINUED | OUTPATIENT
Start: 2021-04-26 | End: 2021-05-01

## 2021-04-26 RX ORDER — LIDOCAINE 40 MG/G
CREAM TOPICAL
Status: ACTIVE | OUTPATIENT
Start: 2021-04-26 | End: 2021-04-29

## 2021-04-26 RX ADMIN — POLYETHYLENE GLYCOL 3350 17 G: 17 POWDER, FOR SOLUTION ORAL at 21:14

## 2021-04-26 RX ADMIN — MYCOPHENOLIC ACID 360 MG: 360 TABLET, DELAYED RELEASE ORAL at 21:14

## 2021-04-26 RX ADMIN — FENTANYL CITRATE 50 MCG: 50 INJECTION, SOLUTION INTRAMUSCULAR; INTRAVENOUS at 11:05

## 2021-04-26 RX ADMIN — PROPOFOL 25 MCG/KG/MIN: 10 INJECTION, EMULSION INTRAVENOUS at 11:15

## 2021-04-26 RX ADMIN — PIPERACILLIN AND TAZOBACTAM 2.25 G: 2; .25 INJECTION, POWDER, FOR SOLUTION INTRAVENOUS at 14:43

## 2021-04-26 RX ADMIN — ATORVASTATIN CALCIUM 80 MG: 40 TABLET, FILM COATED ORAL at 08:23

## 2021-04-26 RX ADMIN — POLYETHYLENE GLYCOL 3350 17 G: 17 POWDER, FOR SOLUTION ORAL at 13:00

## 2021-04-26 RX ADMIN — SODIUM CHLORIDE: 9 INJECTION, SOLUTION INTRAVENOUS at 11:00

## 2021-04-26 RX ADMIN — MYCOPHENOLIC ACID 360 MG: 360 TABLET, DELAYED RELEASE ORAL at 12:59

## 2021-04-26 RX ADMIN — MIDAZOLAM 1 MG: 1 INJECTION INTRAMUSCULAR; INTRAVENOUS at 11:11

## 2021-04-26 RX ADMIN — VANCOMYCIN HYDROCHLORIDE 750 MG: 10 INJECTION, POWDER, LYOPHILIZED, FOR SOLUTION INTRAVENOUS at 15:32

## 2021-04-26 RX ADMIN — ASPIRIN 81 MG CHEWABLE TABLET 81 MG: 81 TABLET CHEWABLE at 08:23

## 2021-04-26 RX ADMIN — ISOSORBIDE MONONITRATE 30 MG: 30 TABLET, EXTENDED RELEASE ORAL at 12:59

## 2021-04-26 RX ADMIN — PREDNISONE 5 MG: 5 TABLET ORAL at 08:23

## 2021-04-26 RX ADMIN — TICAGRELOR 90 MG: 90 TABLET ORAL at 20:24

## 2021-04-26 RX ADMIN — FENTANYL CITRATE 50 MCG: 50 INJECTION, SOLUTION INTRAMUSCULAR; INTRAVENOUS at 11:10

## 2021-04-26 RX ADMIN — PIPERACILLIN AND TAZOBACTAM 2.25 G: 2; .25 INJECTION, POWDER, FOR SOLUTION INTRAVENOUS at 08:35

## 2021-04-26 RX ADMIN — TICAGRELOR 90 MG: 90 TABLET ORAL at 08:23

## 2021-04-26 RX ADMIN — PIPERACILLIN AND TAZOBACTAM 2.25 G: 2; .25 INJECTION, POWDER, FOR SOLUTION INTRAVENOUS at 02:40

## 2021-04-26 RX ADMIN — FLUTICASONE PROPIONATE 2 SPRAY: 50 SPRAY, METERED NASAL at 13:00

## 2021-04-26 RX ADMIN — HUMAN ALBUMIN MICROSPHERES AND PERFLUTREN 4 ML: 10; .22 INJECTION, SOLUTION INTRAVENOUS at 11:06

## 2021-04-26 RX ADMIN — PIPERACILLIN AND TAZOBACTAM 2.25 G: 2; .25 INJECTION, POWDER, FOR SOLUTION INTRAVENOUS at 20:24

## 2021-04-26 RX ADMIN — MAGNESIUM SULFATE IN WATER 2 G: 40 INJECTION, SOLUTION INTRAVENOUS at 21:13

## 2021-04-26 ASSESSMENT — ACTIVITIES OF DAILY LIVING (ADL)
ADLS_ACUITY_SCORE: 16
ADLS_ACUITY_SCORE: 16
ADLS_ACUITY_SCORE: 18
ADLS_ACUITY_SCORE: 16
ADLS_ACUITY_SCORE: 16

## 2021-04-26 ASSESSMENT — MIFFLIN-ST. JEOR: SCORE: 961.25

## 2021-04-26 NOTE — PLAN OF CARE
PT 4E: CANCEL; pt not medically appropriate for session as trops trending up and heading to the OR for procedure. Will reschedule.

## 2021-04-26 NOTE — OP NOTE
[facility] Brief Operative Note    Pre-operative diagnosis: Wound infection [T14.8XXA, L08.9]   Post-operative diagnosis * No post-op diagnosis entered *   Procedure:  Wound exploration with debridement and irrigation and Therevac dressing placement   Surgeon: Raven Lewis MD   Assistants(s): Devendra Salgado MD   Estimated blood loss: Less than 10 ml    Specimens: None   Findings:  Largely healthy appearing tissues in both groins although some devitalization of the left sartorius muscle flap edge pulsatile and healthy-appearing graft.         Brief clinical history: This is a woman who had an explantation of an infected femorofemoral graft with replaced cadaveric graft and sartorius muscle flaps with VAC wound placement yesterday.  Plan to bring her back to the OR to make sure there was no recurrence of purulence to further clean the tissues and ensure viability of her sartorius muscle flaps, and to consider installation of Therevac irrigating vacuum-assisted closure.    Operative details:    Patient was prepped and draped access to her lower abdomen and groin wounds.  Under sedation the dressings were taken down and the VAC sponges were removed.  On the right groin the tissues appeared to be very healthy and typically the sartorius muscle also very healthy.  There was no sign of purulence or infection.  The left groin showed some duskiness to the edge of the sartorius muscle.  We took the suture attachments down and identified deep to this or bypass graft and tunnel.  Below the right groin incision there was also severely devitalized skin and subcutaneous tissue which we resected to make a larger defect and to expose healthy tissue without exposing vessel.  We approximated some of the healthy skin and subcutaneous tissue in this lower corner with interrupted nylon sutures.  We took time to remove any of the Surgicel powder from this wound to try to clean it up as much as possible and we used Pulsavac irrigation  to further clean it.  We cleaned both groins now with the Pulsavac  as well as further opening the subcutaneous tunnel that had been infected before said we could easily passed between the 2 sides.  We irrigated this with the Pulsavac as well.    We now reattach the sartorius muscle flap using the healthy muscle to completely close off the defect in the left groin that exposed the artery.  With the artery completely hidden we now placed a Therevac dressing.  This involves placing the fenestrated sponge each groin segment.  We then placed the additional sponge segment so that they overlapped across the mid abdomen creating the continuous sponge from 1 going to the other.  The back was then reactivated with a 5-minute instillation time for saline.    Procedure was well-tolerated.

## 2021-04-26 NOTE — ANESTHESIA CARE TRANSFER NOTE
Patient: Maribel Yang    Procedure(s):  IRRIGATION AND DEBRIDEMENT, INGUINAL REGION, PLACEMENT OF VERAFLO WOUND VAC, WOUND WASHOUT,    Diagnosis: Wound infection [T14.8XXA, L08.9]  Diagnosis Additional Information: No value filed.    Anesthesia Type:   No value filed.     Note:    Oropharynx: oropharynx clear of all foreign objects and spontaneously breathing  Level of Consciousness: awake  Oxygen Supplementation: nasal cannula  Level of Supplemental Oxygen (L/min / FiO2): 4  Independent Airway: airway patency satisfactory and stable  Dentition: dentition unchanged  Vital Signs Stable: post-procedure vital signs reviewed and stable  Report to RN Given: handoff report given  Patient transferred to: ICU  Comments: Awake to ICU VSS  ICU Handoff: Call for PAUSE to initiate/utilize ICU HANDOFF, Identified Patient, Identified Responsible Provider, Reviewed the Pertinent Medical History, Discussed Surgical Course, Reviewed Intra-OP Anesthesia Management and Issues during Anesthesia, Set Expectations for Post Procedure Period and Allowed Opportunity for Questions and Acknowledgement of Understanding      Vitals: (Last set prior to Anesthesia Care Transfer)  CRNA VITALS  4/26/2021 1140 - 4/26/2021 1240      4/26/2021             ART BP:  159/79    ART Mean:  105        Electronically Signed By: SANDRA De Los Santos CRNA  April 26, 2021  12:40 PM

## 2021-04-26 NOTE — PROGRESS NOTES
Admitted/transferred from: Cath lab  Reason for admission/transfer: Post STEMI  2 RN skin assessment: completed by Julissa Lewis and Maulik Whipple RN  Result of skin assessment and interventions/actions: WOC consult and compass.  Height, weight, drug calc weight:done  Patient belongings (see Flowsheet)  MDRO education added to care plan Yes   ?

## 2021-04-26 NOTE — OP NOTE
[facility] Brief Operative Note    Pre-operative diagnosis: Wound infection [T14.8XXA, L08.9]   Post-operative diagnosis  infected vascular graft with groin hematoma   Procedure:  Groin exploration and evacuation of abscess, irrigation, debridement, and docking.   Surgeon: Raven Lewis MD   Assistants(s): Devendra Salgado MD   Estimated blood loss: Less than 50 ml    Specimens:  Purulent fluid sent for culture   Findings:  Frankly infected groin with pus.         Brief clinical history: This is 70-year-old woman who underwent endovascular pair of abdominal aortic aneurysm 2 weeks ago with an aorto uniiliac device to the left iliac system and a femoral-femoral crossover graft.  Procedure was complicated by postop MI requiring emergency cardiac catheterization and LAD stenting.  Was placed on Brilinta afterwards.  At home she developed draining right groin wound infection and came in through the ER.  Hemodynamically stable and not febrile but on steroids for known renal failure and in remission from active cancer.  A CT scan was done without contrast that showed a large fluid collection tracking along the entire length of the femoral-femoral graft and with fluid around both anastomoses in the groins.  Plan for right groin surgical exploration at this point to see if the graft is exposed and the extent of additional repair.  Cryo SFA graft has been ordered to come in for tomorrow that is properly type match for the patient but in emergency we could use an existing graft in the hospital.    Operative details:  It was prepped and draped for the access to her lower abdomen and both groins as well as both lower extremities circumferentially.  The left groin incision was opened.  It fell apart quite easily.  There was purulent material in the wound and this was sent for culture.  The bovine graft was exposed in the field and completely unincorporated.  It was pulsatile and there was no bleeding.  The anastomosis was clearly  intact.  Because of the appearance of the material we bathe the wound and a mixture of Betadine and hydrogen peroxide for 5 minutes.  We then used Pulsavac to irrigate out the wound with 1 L of vancomycin-containing solution.  After this wound.  Quite a bit .  Exploring the tract there was additional infected hematoma and material coming from it.  We cleared this as well.  We then packed the wound with saline soaked gauze.    Procedure was well-tolerated.

## 2021-04-26 NOTE — CONSULTS
"[facility] Nurse Inpatient Pressure Injury Assessment   Reason for consultation: Evaluate and treat buttock wounds    ASSESSMENT  Right fleshy buttock partial thickness wound consistent with friction or abrasion, not pressure.  Birth defect causing dimple over the sacrum   No Pressure Injury on buttocks at this time.     TREATMENT PLAN  buttock wound:   Cleanse with MicroKlenz moistened gauze   Pat dry.   Apply no sting barrier film to the keri wound skin and allow to dry   Cover with Sacral  Mepilex dressing, carefully molding into place  Paint the edges of the mepilex dressing with no-sting barrier film  Change every third day and as needed      PIP ACTIVITY MEASURES:  If pt is refusing to turn or reposition they must be educated on the  potential injury from not off loading pressure.  Then this \"educated refusal\" needs to be documented as an \"educated refusal to turn/ reposition\" and document if alert, etc. Additionally, if repeated refusals ensure the charge nurse, nurse manager and the provider is aware.  Follow Juan Luis Risk recommendations      CHAIR: Pt should sit on a chair cushion ([id number]) when up to the chair and not sit for more than one hour at a time before fully offloading backside (either stand for a couple of minutes and/or return to bed, positioning on a side) to relieve pressure and re-perfuse tissue.  Additionally, encourage pt to shift side to side every 15 minutes, too.        BED:  Reposition side to side every 1-2 hours when awake.     No direct supine positioning, position only side to side    Keep heels elevated    As able keep HOB below 30 degrees    Orders Written  WO Nurse follow-up plan:weekly  Nursing to notify the Provider(s) and re-consult the [facility] Nurse if wound(s) deteriorates or new skin concern.    Patient History  According to provider note(s):  70 year old female who presents with PMHx of inferior STEMI s/p RCA stent on 4/07/2021, DVT, AAA prior fem-fem bypass s/p EVAR on 4/06/2021 who " presented with  right femoral erythema that was later found to be secondary to surgical site infection and perigraft fluid collection.      S/p 4/25: groin washout, sartorius muscle flap and redo fem fem bypass with cadaveric artery on s/p 4/26 Axplantation infected graft, femoral to femoral bypass with cadaveric artery, bilateral SATORIUS MUSCLE FLAP CREATION, evacuation of absess, vacuum closure    Objective Data  Containment of urine/stool: Incontinent pad in bed and Indwelling catheter    Current Diet/ Nutrition:  Orders Placed This Encounter      Low Saturated Fat Na <2400 mg      Output:   I/O last 3 completed shifts:  In: 2445.55 [P.O.:140; I.V.:1005.55]  Out: 1995 [Urine:975; Blood:1020]    Risk Assessment:   Sensory Perception: 4-->no impairment  Moisture: 4-->rarely moist  Activity: 1-->bedfast  Mobility: 2-->very limited  Nutrition: 2-->probably inadequate  Friction and Shear: 2-->potential problem  Juan Luis Score: 15      Labs:   Recent Labs   Lab 04/26/21  1328 04/25/21  2200 04/25/21  2200 04/23/21  1935 04/23/21 1935   ALBUMIN  --   --   --   --  2.0*   HGB 8.5*   < >  --    < > 9.1*   INR  --   --  1.18*  --   --    WBC 15.1*   < >  --    < > 13.4*    < > = values in this interval not displayed.       Physical Exam  Skin inspection: focused buttocks    Wound Location:  Right fleshy Buttock        Date of last Photo 4/26/21  dimpled area over the sacrum is a birth defect. Skin inferior to this with pink blanchable erythema, dry  Right fleshy buttock:   Measurements (length x width x depth, in cm) 0.5 cm x 0.5 cm  x  0.2 cm   Wound Base:  100 % dermis  Palpation of the wound bed: normal   Periwound skin: intact  Color: normal and consistent with surrounding tissue  Temperature: normal   Drainage:, none  Odor: none  Pain: denies ,     Interventions  Current support surface: Standard  Low air loss mattress  Current off-loading measures: Pillows  Repositioning aid: Pillows  Visual inspection of wound(s)  completed   Wound Care: was done per plan of care.  Supplies: at bedside  Educated provided: importance of repositioning and plan of care  Education provided to: patient   Discussed importance of:repositioning every 2 hours  Discussed plan of care with Nurse

## 2021-04-26 NOTE — OP NOTE
[facility] Brief Operative Note    Pre-operative diagnosis: Wound infection [T14.8XXA, L08.9]   Post-operative diagnosis  infected vascular graft   Procedure: Procedure(s):  Axplantation infected graft, femoral to femoral bypass with cadaveric artery, bilateral SATORIUS MUSCLE FLAP CREATION, evacuation of absece, vacuum closure   Surgeon: Raven Lewis MD   Assistants(s): Devendra Salgado   Estimated blood loss: 1000ml    Specimens:  Infected vascular graft some which was sent for pathology and some for culture.  Abscess fluid sent for culture.  Infected hematoma sent for culture.   Findings:  Angry infected class IV wounds with infected abscess and graft tract as well as clearly infected graft.  No active bleeding.           Brief clinical history: This 70-year-old woman with endovascular pair of abdominal aortic aneurysm using an aorta uniiliac device and then a femoral-femoral left to right subcutaneous crossover bovine graft bypass.  Postoperative course of been complicated by myocardial infarction and then required Brilinta.  Presented with infected hematoma and abscess in the right groin which we explored and cleaned yesterday.  Now presents for definitive therapy with the intent to remove the infected graft and replace it through a different retropubic path using cryo artery.  Overnight her Gram stain show gram-positive cocci consistent with this Staphylococcus.  The patient has been stable overnight and tells us that her pain is much less since the evacuation of the right groin abscess.  Hemodynamically stable.    Operative details:  Patient was prepped and draped access to her abdomen both groins and both lower extremity circumferentially.  She was placed under general anesthesia.  The dressings were taken down over the left groin.  There was additional recurrent purulent material and this appeared to be draining from the tract of the femorofemoral bypass.  We cleared as much as we could have this material  and then once again attempted to decontaminate the field with a mixture of Betadine and peroxide solution which we let sit in the groin for 5 minutes.  In the meantime we opened the left groin and found that immediately upon reopening the old oblique incision that there was purulent drainage.  This pus once again appear to be draining from the tract of the femorofemoral bypass rating to the groin wound.  None of the vessels were incorporated.  We did time to clear this is much as possible and here to decontaminated with the Betadine peroxide solution.  We now milked the subcutaneous graft tunnel expressing a large amount of infected purulent from thrombus material into both groins.  Once we had cleared this all aggressively we determine to remove the infected graft.    We clamped the graft just distal to each anastomosis and then transected.  We pulled it out through the tunnel and then further cleared the tunnel.    Dissecting medially from our common femoral arteries on both sides we were able to dissect out and expose the common femoral vein and going further medial to this we identified path into the retropubic space.  This was not contaminated and we were able to create a passage between the 2 groins in this new channel.  As we dissected on the left side we got into some bleeding presumably from the small circumflex branch that runs there.  We could not clearly identify it we able to get hemostasis with Surgicel powder.  Once we had good hemostasis we passed the cryo SFA a positive graft through the channel without difficulty making sure that it was not twisted by marking it.    Because of the bruising and because she had been on Brilinta we elected not to give heparin but simply clamped the left comfortable artery and resected the remainder of the bovine graft off the anastomosis.  We spatulated the cryo graft and using 6-0 Prolene suture sewed it back into the same opening in the native artery without  difficulty.  We completed the repair and then restored flow with the clamp on the graft to get flow back to the leg.    We then performed the same maneuver in the right groin.  Prior to completing anastomosis we backflush and forward flush the vessels and restored flow.  Of note there had been good backbleeding from the profunda although no backbleeding from the SFA on the right.  The patient had had a known SFA occlusion.    With the graft completed there was excellent pulsatile flow in both groins by Doppler and good Doppler signals on the posterior tibial artery level on the right and dorsalis pedis level on the left.    We now dissected out the sartorius muscle in each groin all the way up to the ASIS.  Involved making extension of the incision cephalad and toward the ASIS on each side.  Using cautery we resected it off the ASIS and then turned it down to have it cover the groin vasculature.  This completely hidden the cryo artery and the anastomosis.  We tacked the muscle flap down with interrupted mattress 2-0 Vicryl sutures.  With the vessels completely covered we now further irrigated the wounds and the old tunnel.  We used nylon sutures to partially close the wound where we had extended the incision to find the ASIS on each side.  We then placed a VAC sponge in each defect and secured in place with Tegaderm.  We then activated vacuum-assisted closure.    Procedure was very well tolerated.

## 2021-04-26 NOTE — ANESTHESIA POSTPROCEDURE EVALUATION
Patient: Maribel Yang    Procedure(s):  IRRIGATION AND DEBRIDEMENT, INGUINAL REGION, PLACEMENT OF VERAFLO WOUND VAC, WOUND WASHOUT,    Diagnosis:Wound infection [T14.8XXA, L08.9]  Diagnosis Additional Information: No value filed.    Anesthesia Type:  MAC    Note:  Disposition: ICU            ICU Sign Out: Anesthesiologist/ICU physician sign out WAS performed   Postop Pain Control: Uneventful            Sign Out: Well controlled pain   PONV: No   Neuro/Psych: Uneventful            Sign Out: Acceptable/Baseline neuro status   Airway/Respiratory: Uneventful            Sign Out: Acceptable/Baseline resp. status   CV/Hemodynamics: Uneventful            Sign Out: Acceptable CV status; No obvious hypovolemia; No obvious fluid overload   Other NRE: NONE   DID A NON-ROUTINE EVENT OCCUR?            Last vitals:  Vitals:    04/26/21 1030 04/26/21 1045 04/26/21 1300   BP:      Pulse: 72 71 68   Resp:   14   Temp:   36.7  C (98.1  F)   SpO2: 97% 97% 96%       Last vitals prior to Anesthesia Care Transfer:  CRNA VITALS  4/26/2021 1140 - 4/26/2021 1240      4/26/2021             ART BP:  159/79    ART Mean:  105          Electronically Signed By: Erick Esqueda MD  April 26, 2021  1:39 PM

## 2021-04-26 NOTE — PLAN OF CARE
D: Admitted to 4E overnight d/t MI, noted on postop EKG, 2/2 diffuse coronary vasospasm. RTOR today for bilateral groin wound I/D w/ veraflow wound vac placement by vascular surgery. Portable echo prior to OR. Pending ROLY tomorrow morning, NPO after midnight per echo staff. A/O. Followed commands and moved all extremities. Hemodynamically stable, nitroglycerin gtt off per SICU intensivist, imdur started. BC x2, pending PICC placement, tentatively tomorrow per ID. BM x1. UOP ok. Wound care RN assessed buttock/coccyx area today w/ no pressure ulcer noted. PRBC x1 today.  I: As above. Hygiene care provided. Spouse updated re: status.  A: No acute event this shift.  P: Continue to monitor, groin wound and off ntg. Notify MD of changes/concerns.

## 2021-04-26 NOTE — ANESTHESIA CARE TRANSFER NOTE
Patient: Maribel Yang    Procedure(s):  Axplantation infected graft, femoral to femoral bypass with cadaveric artery, bilateral SATORIUS MUSCLE FLAP CREATION, evacuation of absece, vacuum closure    Diagnosis: Wound infection [T14.8XXA, L08.9]  Diagnosis Additional Information: No value filed.    Anesthesia Type:   General     Note:    Oropharynx: spontaneously breathing  Level of Consciousness: awake  Oxygen Supplementation: face mask  Level of Supplemental Oxygen (L/min / FiO2): 6  Independent Airway: airway patency satisfactory and stable  Dentition: dentition unchanged  Vital Signs Stable: post-procedure vital signs reviewed and stable  Report to RN Given: handoff report given  Patient transferred to: PACU  Comments: Airway :Face Mask  Patient transferred to:PACU  Comments: Prior to extubation, patient was reversed with 200 mg of Sugammadex and shortly afterwards observed with spontaneous breathing with regular pattern and proper tidal volumes. Patient woke up, opened their eyes to verbal stimuli and followed basic commands. Patient was suctioned and extubated without complication.   Transported to PACU on 6L O2 via face mask.   VSS upon arrival to PACU.  Patient denies nausea or pain at this time.   Care transfer plan communicated, appropriate time for questions was given and patient care transferred to PACU RN       Ponce Mai MD      Handoff Report: Identifed the Patient, Identified the Reponsible Provider, Reviewed the pertinent medical history, Discussed the surgical course, Reviewed Intra-OP anesthesia mangement and issues during anesthesia, Set expectations for post-procedure period and Allowed opportunity for questions and acknowledgement of understanding      Vitals: (Last set prior to Anesthesia Care Transfer)  CRNA VITALS  4/25/2021 2045 - 4/25/2021 2127      4/25/2021             NIBP:  134/87    Ht Rate:  81    SpO2:  100 %    EKG:  NSR        Electronically Signed By: Ponce  Bull Mai MD  April 25, 2021  9:27 PM

## 2021-04-26 NOTE — PROGRESS NOTES
SURGICAL ICU ADMISSION NOTE  4/25/2021      ASSESSMENT: Maribel Yang is a 70 year old female with a history of infrarenal AAA, kidney transplant, s/p EVAR with fem-fem bypass on 4/6 c/b inferior MI with LIUDMILA in the RCA. Admitted on 4/23 with infected R femoral graft. S/p R groin irrigation and debridement 4/24, and s/p groin washout, sartorius muscle flap, femoral femoral bypass with cadaveric artery 4/25, found to have diffuse coronary vasospasm post op.    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    Today's changes:  - 1u pRBC this am, repeat HGB this pm  - EKG this am- no ST elevation, now w/ T wave inversions in lateral leads  - trend troponin to peak  - RTOR for vac change today  - consult ID transplant for abx recs/duration  - repeat blood cultures, will place PICC once negative x48 hrs  - transplant nephrology consult  - d/c NS@50  - imdur 60mg qday  - d/c nitro gtt  - amlodipine 5mg if needed for HTN    PLAN:   Neuro/ pain/ sedation:  - Monitor neurological status. Notify the MD/DO for any acute changes in exam.  - Tylenol, oxy for pain.    HEENT:  - PTA Flonase     Pulmonary care:   # Lung cancer in remission  - Supplemental oxygen to keep saturation above 92 %.  - Incentive spirometer every 15- 30 minutes when awake.     Cardiovascular:  # AAA, TAA s/p EVAR  # Fem-fem bypass c/b groin wound s/p washout and new bypass  # Inferior MI s/p LIUDMILA  # Diffuse coronary vasospasm  # HTN  - Brillinta   - ASA  - atorvastatin, hold PTA lisinopril, metoprolol   - Imdur 60mg qday  - will add amlodipine 5mg qday if needed for HTN  - Troponin q6h- trend to peak  - appreciate Cardiology recs- when safe from a surgical standpoint can discontinue aspirin and restart eliquis and continue ticagrelor 90mg BID.     GI care/ Nutrition:   - NPO for OR  - Miralax     Fluids/ Electrolytes/ renal:   # S/p kidney transplant  # AMY  -  Baseline Cr ~ 1.3-1.6  - appreciate Transplant Nephrology recs  - d/c NS@50  - grayson     Endocrine:    - No management indication     ID/ Antibiotics:  # Immunosuppression  # Infected graft- 4/23 1/2 blood cultures + GPCs in clusters, 4/24 wound cx +S aureus   - Zosyn/vanc  - Transplant ID consulted  - PTA prednisone resumed, sirolimus transitioned to MMF for keri-op period     Heme:   # Acute blood loss anemia, EBL 1L  - 1u pRBC this am, repeat HGB this pm  - Brillinta, aspirin     Prophylaxis:    - Mechanical prophylaxis for DVT     Lines/ tubes/ drains:  - A line  - pIV x2- EJ  - Wound vac bilateral groin  - grayson     Disposition:  - RTOR today, SICU post-op    CODE:  - Full    Patient seen and discussed with SICU staff, Dr. Booth, who is in agreement with plan.    Clair Mack MD  Surgery, PGY2  [id number]    - - - - - - - - - - - - - - - - - - - - - - - - - - - - - - - - - - - - - - - - - - - - - - - - - - - - - - - - - - - - - - - - - - - - -     PRIMARY TEAM: Vascular  PRIMARY PHYSICIAN: Joshua    SUBJECTIVE: Patient denies any chest pain or pressure this am. No shortness of breath. No nausea. +gas. No bowel movement for several days (though one is documented).    PHYSICAL EXAMINATION:  Temp:  [97.4  F (36.3  C)-97.8  F (36.6  C)] 97.7  F (36.5  C)  Pulse:  [56-95] 80  Resp:  [12-36] 14  BP: ()/(51-96) 103/61  MAP:  [73 mmHg-131 mmHg] 73 mmHg  Arterial Line BP: ()/() 89/64  SpO2:  [92 %-99 %] 97 %  General: Alert, well-appearing in no acute distress.  HEENT: Normocephalic, atraumatic  Neck: Supple  Respiratory: NLB on 4L, CTAB  Cardiovascular: RRR  Gastrointestinal: Abdomen soft, non-distended, non-tender  Extremities: bilateral groin wound vacs. Doppler signals bilateral PT/DP.  Skin: As noted above.   Neuro: no focal deficits    LABS: Reviewed.   Arterial Blood Gases   Recent Labs   Lab 04/25/21 2011 04/25/21 1912 04/24/21  1313   PH 7.30* 7.32* 7.46*   PCO2 43 39 29*   PO2 110*  124* 58*   HCO3 21 20* 21     Complete Blood Count   Recent Labs   Lab 04/26/21 0459 04/26/21 0157 04/25/21 2125 04/25/21 2011 04/25/21  0511 04/25/21  0511   WBC 13.4* 14.7* 14.2*  --   --  12.8*   HGB 8.2* 8.3* 10.0* 9.9*   < > 9.4*    454* 544*  --   --  428    < > = values in this interval not displayed.     Basic Metabolic Panel  Recent Labs   Lab 04/26/21 0459 04/26/21 0157 04/25/21 2125 04/25/21 2011 04/25/21  0511 04/25/21  0511    136 139 137   < > 137   POTASSIUM 4.4 4.5 4.2 4.7   < > 4.1   CHLORIDE 112* 108 111*  --   --  109   CO2 18* 20 19*  --   --  19*   BUN 38* 38* 33*  --   --  33*   CR 1.51* 1.53* 1.35*  --   --  1.46*   * 136* 143* 132*   < > 108*    < > = values in this interval not displayed.     Liver Function Tests  Recent Labs   Lab 04/25/21 2200 04/23/21  1935   AST  --  16   ALT  --  15   ALKPHOS  --  169*   BILITOTAL  --  0.5   ALBUMIN  --  2.0*   INR 1.18*  --      Pancreatic Enzymes  No lab results found in last 7 days.  Coagulation Profile  Recent Labs   Lab 04/25/21 2200   INR 1.18*   PTT 26     Lactate  Invalid input(s): LACTATE    IMAGING:  Results for orders placed or performed during the hospital encounter of 04/23/21   CT Abdomen Pelvis w/o Contrast    Narrative    EXAMINATION: CT ABDOMEN PELVIS W/O CONTRAST, 4/23/2021 9:24 PM    TECHNIQUE:  Helical CT images from the lung bases through the  symphysis pubis were obtained without IV contrast. Contrast dose: None    COMPARISON: Chest abdomen and pelvis CTA 3/22/2021    HISTORY: Abdominal abscess/infection suspected. Status post EVAR and  aorto uniliac graft 4/6/21    FINDINGS:    Abdomen and pelvis: Postprocedural changes of abdominal aorta EVAR  with left uni-iliac graft. The distal limb of the graft is positioned  in the left common femoral artery. The proximal end of the graft is  located above the native renal arteries The abdominal aortic aneurysm  sac measures 6.2 x 5.8 cm on image 185 of series  5. There is mild  stranding surrounding the aneurysm sac consistent with postsurgical  changes. Vascular patency not evaluated on this noncontrast study.     Unremarkable noncontrast appearance of the liver, gallbladder,  pancreas, and spleen. Nondilated common bile duct. Left adrenal  calcification. Bilateral atrophic native kidneys. 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  A small hemorrhagic/proteinaceous cyst in the mid transplant, series 3  image 49 suspected measuring 7 mm.    No small bowel obstruction. Scattered stool throughout the colon.  Appendicoliths within nondilated appendix demonstrate. No adjacent  inflammation.    The bladder and uterus are within normal limits.    Lung bases/lower chest:  There is a pericardial effusion measuring up  to 1.5 cm in thickness on image 35 of series 5. Aneurysmal lower  thoracic aorta measuring up to 4.6 cm. Atherosclerosis. The lung bases  are relatively clear. Emphysematous changes. No pleural effusion.  There may be a small Bochdalek fat-containing hernia along the right  hemidiaphragm.    Bones and soft tissues: There is an anterior abdominal wall fluid  collection with thick walls loculations measuring 7.9 x 3.4 cm and 6.0  x 2.1 cm on image 327 of series 5, extending from the right groin  surgical site right common femoral artery along the femorofemoral  bypass graft to the left common femoral artery. There is surrounding  inflammatory stranding of the lower anterior abdominal wall and right  groin. No acute or aggressive appearing bone lesion. Body wall edema.      Impression    IMPRESSION:   1. Large thick-walled fluid collection in the lower abdominal wall  extending from the right groin surgical site and right common femoral  artery along the femoral-femoral bypass graft to the left common  femoral artery. Surgical consultation is recommended.    2. Postprocedural changes of abdominal aortic EVAR with left  uni-iliac  graft.     I have personally reviewed the examination and initial interpretation  and I agree with the findings.    ELISSA RIVAS MD   US Lower Ext Arterial Duplex Limited Bilat    Narrative    Exam: US LOWER EXT ARTERIAL DUPLEX LIMITED BILAT, 2021 8:06 AM    Indication: evaluate right groin, left groin, and fem-fem bypass    Comparison: CT 2021    Findings:   There is a complex fluid collection in the anterior soft tissues in  the lower abdomen measuring approximately 8.5 x 17.4 x 2.7, this  corresponds to the fluid collection noted on the abdominal CT from  2021. The femoral femoral bypass graft is patent with with  monophasic/biphasic waveforms with a sharp systolic upstroke. The left  external iliac artery above the graft, common femoral artery below the  graft and left proximal profunda femoral artery are patent with normal  triphasic waveforms. The right superficial femoral artery below the  anastomosis is patent with monophasic waveforms with a sharp systolic  upstroke. The right common femoral artery at the anastomosis is patent  with triphasic waveforms.      Impression    Impression:   1. Patent femoral-femoral bypass graft.  2. Large complex lower abdominal wall postoperative fluid collection  measuring approximately 8.5 x 17.4 x 2.7 cm, this was previously  visualized on CT 2021 and ultrasound 2021 however appears to  have increased in size. Consider surgical consultation.    I have personally reviewed the examination and initial interpretation  and I agree with the findings.    MAGALIE MARS MD   Echocardiogram Limited    Narrative    [id number]  [id number]  [id number]  [id number]^JACOB^DAVID^[facility],[place]  Echocardiography Laboratory  [address] [place] [id number]     Name: ERASMO MERINO  MRN: [id number]  : 1950  Study Date: 2021 09:57 AM  Age: 70 yrs  Gender: Female  Patient Location:  [facility]  Reason For Study: MI  Ordering Physician: DAVID JAMES  Performed By: Sayda Pina RDCS     BSA: 1.4 m2  Height: 62 in  Weight: 101 lb  BP: 131/82 mmHg  ______________________________________________________________________________  Procedure  Limited Portable Echo Adult. Contrast Optison. Optison (NDC #9433-6839-60)  given intravenously. Patient was given 5 ml mixture of 3 ml Optison and 6 ml  saline. 4 ml wasted.  ______________________________________________________________________________  Interpretation Summary  Moderately (EF 35-40%) reduced left ventricular function is present. There is  thinning and akinesis of the mid septal segment. Inferior wall akinesis is  present. Apical wall akinesis is present.  Trivial pericardial effusion is present.     This study was compared with the study from 4/7/2021. No significant changes  noted.  ______________________________________________________________________________  Left Ventricle  There is thinning and akinesis of the mid septal segment. Inferior wall  akinesis is present. Apical wall akinesis is present. Moderately (EF 35-40%)  reduced left ventricular function is present.     Mitral Valve  The mitral valve is normal. Mild mitral insufficiency is present.     Aortic Valve  Trileaflet aortic sclerosis without stenosis.     Tricuspid Valve  The tricuspid valve is normal. Mild tricuspid insufficiency is present.  Pulmonary artery systolic pressure cannot be assessed.     Vessels  The inferior vena cava was normal in size with preserved respiratory  variability.     Pericardium  Trivial pericardial effusion is present.     Compared to Previous Study  This study was compared with the study from 4/7/2021 . No significant changes  noted.     ______________________________________________________________________________  Report approved by: MD Kash Tomas 04/24/2021 10:32 AM      ______________________________________________________________________________        *Note: Due to a large number of results and/or encounters for the requested time period, some results have not been displayed. A complete set of results can be found in Results Review.       Patient seen, findings and plan discussed with surgical ICU staff.

## 2021-04-26 NOTE — PROGRESS NOTES
[facility]   Cardiology Consults  Progress Note       ASSESSMENT/PLAN:  ASSESSMENT:   Maribel Yang is a 70 year old female who presents with PMHx of inferior STEMI s/p RCA stent on 4/07/2021, DVT, AAA prior fem-fem bypass s/p EVAR on 4/06/2021 who presented with  right femoral erythema that was later found to be secondary to surgical site infection and perigraft fluid collection.     She underwent groin washout, sartorius muscle flap and redo fem fem bypass with cadaveric artery on 4/25. Morning Brillinta dose held against recommendations of cardiology consult service. Post operatively was found to have inferior ST elevations and taken to cath lab, found to have diffuse coronary vasospasm with complete occlusion of RCA that reopened with nitroglycerin. She was reloaded with a total of 180 brillinta between PACU and cath lab. She is chest pain/pressure free post cath on a nitroglycerin ggt.    Regarding prior STEMI: After EVAR on 4/6 developed significant chest comfort, EKG was noted to reveal inferior STEMI that necessitated LIUDMILA to the proximal RCA with plans for DAPT for one year. Patient was then discharged with plans to follow up with Cardiology.    #Coronary vasospasm  #Inferior STEMI s/p LIUDMILA to RCA  #HFrEF with an EF 35-40%  She underwent groin washout, sartorius muscle flap and redo fem fem bypass with cadaveric artery on 4/25. Morning Brillinta dose held against recommendations of cardiology consult service. Post operatively was found to have inferior ST elevations and taken to cath lab, found to have diffuse coronary vasospasm with complete occlusion of RCA that reopened with nitroglycerin. She was reloaded with a total of 180 brillinta between PACU and cath lab. She is chest pain/pressure free post cath on a nitroglycerin ggt. Troponin tending up. Vasospasm likely occurred due to stress of surgery as well as any pressors that she required in the OR. EKG this AM shows  no ST elevations, there are TWI in inferior leads. TTE today showed EF 35-40% with unchanged inferior WMAs.    RECOMMEND:  - start imdur 60 mg daily to prevent further vasospasm and wean off nitroglycerin ggt  - avoid non DHP CCBs (verapamil, diltiazem) given HFrEF. Can start amlodipine 5mg daily in the future if necessary to prevent further vasospasm  - trend troponin to peak  - Continue PO ASA 81mg, continue PO Brilinta 90mg BID  - Hematology has recommended lifelong DOAC for recurrent DVT. When safe from a surgical standpoint can discontinue aspirin and restart eliquis and continue ticagrelor 90mg BID.  - Continue PO Lipitor 80mg qday   - hold BB for now  - ACE inhibitor/ARB, and aldosterone blockade for CAD/STEMI ischemic cardiomyopathy (can be added outpatient if blood pressures do not tolerate)    Interval History:  She underwent groin washout, sartorius muscle flap and redo fem fem bypass with cadaveric artery on 4/25. Morning Brillinta dose held against recommendations of cardiology consult service. Post operatively was found to have inferior ST elevations and taken to cath lab, found to have diffuse coronary vasospasm with complete occlusion of RCA that reopened with nitroglycerin. She was reloaded with a total of 180 brillinta between PACU and cath lab. She is chest pain/pressure free post cath.     At bedside, patient denied chest pain, SOB, lightheadedness and dizziness. She denied PND, orthopnea, nausea, vomitting, diarrhea.     Physical Exam:  Temp:  [97.4  F (36.3  C)-97.8  F (36.6  C)] 97.5  F (36.4  C)  Pulse:  [56-95] 75  Resp:  [12-36] 18  BP: ()/(51-96) 135/92  MAP:  [73 mmHg-131 mmHg] 73 mmHg  Arterial Line BP: ()/() 89/64  SpO2:  [92 %-99 %] 97 %    I/O:   Intake/Output Summary (Last 24 hours) at 4/26/2021 0852  Last data filed at 4/26/2021 0700  Gross per 24 hour   Intake 2345.75 ml   Output 1719 ml   Net 626.75 ml       Wt:   Wt Readings from Last 5 Encounters:   04/26/21  47.8 kg (105 lb 6.1 oz)   04/10/21 48 kg (105 lb 13.1 oz)   04/01/21 42.6 kg (94 lb)   03/16/21 43.3 kg (95 lb 6.4 oz)   09/08/20 42.4 kg (93 lb 8 oz)       GEN: NAD  Pulm: CTAB, no wheezes or rales  Cardiac: JVP 6, no murmurs  Vascular: no lower extremity edema and dopplerable pulses bilaterally  GI: soft, non distended  Neuro: CN II-XII grossly intact    Medications:    aspirin  81 mg Oral Daily     atorvastatin  80 mg Oral Daily     fluticasone  2 spray Both Nostrils Daily     [Held by provider] lisinopril  5 mg Oral Daily     mycophenolic acid  360 mg Oral BID     piperacillin-tazobactam  2.25 g Intravenous Q6H     predniSONE  5 mg Oral Daily     sodium chloride (PF)  3 mL Intracatheter Q8H     ticagrelor  90 mg Oral BID     vancomycin (VANCOCIN) IV  750 mg Intravenous Q24H       nitroGLYcerin 30 mcg/min (04/26/21 0645)       Labs:   CMP  Recent Labs   Lab 04/26/21  0459 04/26/21  0157 04/25/21  2125 04/25/21 2011 04/25/21  0511 04/25/21  0511 04/23/21  1935 04/23/21 1935    136 139 137   < > 137   < > 132*   POTASSIUM 4.4 4.5 4.2 4.7   < > 4.1   < > 4.4   CHLORIDE 112* 108 111*  --   --  109  --  102   CO2 18* 20 19*  --   --  19*  --  21   ANIONGAP 11 8 9  --   --  9  --  9   * 136* 143* 132*   < > 108*   < > 112*   BUN 38* 38* 33*  --   --  33*  --  36*   CR 1.51* 1.53* 1.35*  --   --  1.46*  --  1.63*   GFRESTIMATED 35* 35* 40*  --   --  37*  --  32*   GFRESTBLACK 41* 40* 47*  --   --  42*  --  37*   KAYKAY 8.4* 8.8 8.3*  --   --  8.5  --  9.2   MAG 2.0  --  2.0  --   --   --   --   --    PHOS 4.6*  --  4.1  --   --   --   --   --    PROTTOTAL  --   --   --   --   --   --   --  6.1*   ALBUMIN  --   --   --   --   --   --   --  2.0*   BILITOTAL  --   --   --   --   --   --   --  0.5   ALKPHOS  --   --   --   --   --   --   --  169*   AST  --   --   --   --   --   --   --  16   ALT  --   --   --   --   --   --   --  15    < > = values in this interval not displayed.     CBC  Recent Labs   Lab  21  0459 21  0157 215 21  0511 21  0511   WBC 13.4* 14.7* 14.2*  --   --  12.8*   RBC 2.82* 2.91* 3.40*  --   --  3.30*   HGB 8.2* 8.3* 10.0* 9.9*   < > 9.4*   HCT 25.3* 26.2* 31.4*  --   --  29.2*   MCV 90 90 92  --   --  89   MCH 29.1 28.5 29.1  --   --  28.5   MCHC 32.4 31.7 32.6  --   --  32.2   RDW 16.1* 15.9* 15.9*  --   --  15.4*    454* 544*  --   --  428    < > = values in this interval not displayed.     INR  Recent Labs   Lab 21  2200   INR 1.18*     Arterial Blood Gas  Recent Labs   Lab 21  1912 21  1313   PH 7.30* 7.32* 7.46*   PCO2 43 39 29*   PO2 110* 124* 58*   HCO3 21 20* 21   O2PER 0.44 43.0 30.0       Diagnostics:    EC2021      Echo:  2021  Moderately (EF 35-40%) reduced left ventricular function is present.  Hypokinesis of the inferior, basal anterior, basal inferoseptal and apical  walls with mid/basal septal aneurysm.  Right ventricular function, chamber size, wall motion, and thickness are  normal.  Small pericardial effusion, with a maximum diameter of 1.3 cm, adjacent to the  right atrium, with no hemodynamic significance.  A left pleural effusion is present.    2021  Moderately (EF 35-40%) reduced left ventricular function is present. There is  thinning and akinesis of the mid septal segment. Inferior wall akinesis is  present. Apical wall akinesis is present.  Trivial pericardial effusion is present.     This study was compared with the study from 2021. No significant changes  noted.    Coronary angiogram:  2021    Conclusions  -Single vessel CAD with patent RCA stent and severe diffuse RCA vasospasm and mild LCA/LAD/LCx vasospasm. All resolved with administration of IC nitroglycerin.     Recommendations  -Additional 90 mg of PO ticagrelor given in CCL  -Can stop heparin  -Continuous IV nitroglycerin  -Stop BB  -Consider adding calcium channel blocker for coronary spasm  cautiously given negative ionotropic effect especially with nondihydropyridine CCBs  -Dual antiplatelet treatment with low-dose aspirin daily and 90 mg ticagrelor twice daily as scheduled for recently placed LIUDMILA and recent STEMI.  -Low-dose aspirin po daily lifelong.  -High intensity statin for CAD   -ACE inhibitor/ARB, and aldosterone blockade for CAD/STEMI ischemic cardiomyopathy   -Continued medical management including lifestyle modifications for CV risk factor optimizations

## 2021-04-26 NOTE — ANESTHESIA PREPROCEDURE EVALUATION
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 heterozygote      Age-related osteoporosis without current pathological fracture 2019     Anemia      Antiplatelet or antithrombotic long-term use      ASCUS with positive high risk HPV 2015    + HPV 56, 54,& 6, colp - TAL III, Leep =TAL II     Basal cell carcinoma      Depressive disorder 2015     Hypertension      Immunosuppressed status (H)     due meds     Kidney replaced by transplant     Living donor recipient,  Rejection 2005     LSIL (low grade squamous intraepithelial lesion) on Pap smear 2013    +HPV 33 or 45, 61       PAD (peripheral artery disease) (H)      PONV (postoperative nausea and vomiting)      Squamous cell lung cancer (H)      Thrombosis of leg 1967     Unspecified disorder of kidney and ureter     X-linked dominant Alport's syndrome.      Past Surgical History:   Procedure Laterality Date     BIOPSY      Kidney, Lung, Breast     BIOPSY ANAL N/A 3/14/2018    Procedure: BIOPSY ANAL;;  Surgeon: Shabbir Leo MD;  Location:  OR      TRANSPLANTATION OF KIDNEY      recipient -- done at [facility]     COLONOSCOPY       COLONOSCOPY N/A 2017    Procedure: COMBINED COLONOSCOPY, SINGLE OR MULTIPLE BIOPSY/POLYPECTOMY BY BIOPSY;;  Surgeon: Sushil Hyatt MD;  Location:  GI     COLPOSCOPY,LOOP ELECTRD CERVIX EXCIS  08    TAL II     CONIZATION LEEP  2013    Procedure: CONIZATION LEEP;;  Surgeon: Liliana Renteria MD;  Location:  OR     CONIZATION LEEP N/A 2016    Procedure: CONIZATION LEEP;  Surgeon: Liliana Renteria MD;  Location:  OR     CV CORONARY ANGIOGRAM N/A  4/6/2021    Procedure: CV CORONARY ANGIOGRAM;  Surgeon: Sincere Rosas MD;  Location:  HEART CARDIAC CATH LAB     CV CORONARY ANGIOGRAM N/A 4/25/2021    Procedure: Coronary Angiogram;  Surgeon: Sincere Rosas MD;  Location:  HEART CARDIAC CATH LAB     CV PCI STENT DRUG ELUTING N/A 4/6/2021    Procedure: Percutaneous Coronary Intervention Stent Drug Eluting;  Surgeon: Sincere Rosas MD;  Location:  HEART CARDIAC CATH LAB     ENDOVASCULAR REPAIR ANEURYSM AORTOILIAC Bilateral 4/6/2021    Procedure: Endovascular Abdominal Aortic Aneurysm Repair with Aortouniiliac Device and Femoral-Femoral Artery Bypass with [id number] Artegraft;  Surgeon: Abena Ferrara MD;  Location: [facility] OR     EXAM UNDER ANESTHESIA ANUS  7/15/2014    Procedure: EXAM UNDER ANESTHESIA ANUS;  Surgeon: Radha Musa MD;  Location: [facility] OR     EXAM UNDER ANESTHESIA ANUS N/A 3/14/2018    Procedure: EXAM UNDER ANESTHESIA ANUS;  Anal Exam Under Anesthesia With Excision of anal lesion, proctoscopy;  Surgeon: Shabbir Leo MD;  Location: [facility] OR     EYE SURGERY       GENITOURINARY SURGERY       IR OR ANGIOGRAM  4/6/2021     IRRIGATION AND DEBRIDEMENT GROIN Right 4/24/2021    Procedure: IRRIGATION AND DEBRIDEMENT, INGUINAL REGION, I & D PF RIGHT GROIN;  Surgeon: Raven Lewis MD;  Location: [facility] OR     LASER CO2 EXCISE VULVA WIDE LOCAL  7/15/2014    Procedure: LASER CO2 EXCISE VULVA WIDE LOCAL;  Surgeon: Liliana Renteria MD;  Location: [facility]     LASER CO2 VAGINA  7/17/2013    Procedure: LASER CO2 VAGINA;;  Surgeon: Liliana Renteria MD;  Location: [facility]     LASER CO2 VAGINA N/A 9/25/2018    Procedure: LASER CO2 VAGINA;  Exam Under Anesthesia, CO2 Laser Ablation of Upper Vagina and Cervix;  Surgeon: Pati Garcia MD;  Location: [facility] OR     MICROSCOPY ANAL  7/17/2013    Procedure: MICROSCOPY ANAL;  Anal Microscopy,  EUA vagina,Colposcopy Of Vagina And Vulva, Vaginal Biopsies, Omniguide Co2 Laser To  Vagina and vulva, Loop Electrosurgical Excision Procedure To Cervix;  Surgeon: Radha Musa MD;  Location: [facility] OR     MICROSCOPY ANAL  7/15/2014    Procedure: MICROSCOPY ANAL;  Surgeon: Radha Musa MD;  Location: [facility]     VASCULAR SURGERY      Thrombectomy     ZZC NONSPECIFIC PROCEDURE      Thrombectomy     ZZC NONSPECIFIC PROCEDURE  1955 and 1959    Bilater eye surgery - correction for crossed eyes     ZZC NONSPECIFIC PROCEDURE  1998    oopherectomy L     ZZC NONSPECIFIC PROCEDURE  1967    open kidney biopsy - L      Allergies   Allergen Reactions     Blood Transfusion Related (Informational Only) Other (See Comments)     Patient has a history of a clinically significant antibody against RBC antigens.  A delay in compatible RBCs may occur.     Ultracet Nausea and Vomiting and Hives     Hydrocodone Nausea and Vomiting and Hives      Social History     Tobacco Use     Smoking status: Former Smoker     Packs/day: 0.30     Years: 35.00     Pack years: 10.50     Types: Cigarettes     Start date: 1/1/1967     Smokeless tobacco: Never Used     Tobacco comment: Couple times a week. 1-2 cigs 1-2x a week.   Substance Use Topics     Alcohol use: Not Currently     Alcohol/week: 0.0 standard drinks     Frequency: Monthly or less     Drinks per session: 1 or 2     Binge frequency: Less than monthly     Comment: rarely      Wt Readings from Last 1 Encounters:   04/26/21 47.8 kg (105 lb 6.1 oz)        Anesthesia Evaluation   Pt has had prior anesthetic.     No history of anesthetic complications       ROS/MED HX  ENT/Pulmonary:       Neurologic:       Cardiovascular:       METS/Exercise Tolerance:     Hematologic:       Musculoskeletal:       GI/Hepatic:       Renal/Genitourinary:       Endo:       Psychiatric/Substance Use:       Infectious Disease:       Malignancy:       Other:            Physical Exam    Airway        Mallampati: II   TM distance: > 3 FB   Neck ROM: full   Mouth opening: > 3  cm    Respiratory Devices and Support         Dental           Cardiovascular          Rhythm and rate: regular and normal     Pulmonary                   OUTSIDE LABS:  CBC:   Lab Results   Component Value Date    WBC 13.4 (H) 04/26/2021    WBC 14.7 (H) 04/26/2021    HGB 8.2 (L) 04/26/2021    HGB 8.3 (L) 04/26/2021    HCT 25.3 (L) 04/26/2021    HCT 26.2 (L) 04/26/2021     04/26/2021     (H) 04/26/2021     BMP:   Lab Results   Component Value Date     04/26/2021     04/26/2021    POTASSIUM 4.4 04/26/2021    POTASSIUM 4.5 04/26/2021    CHLORIDE 112 (H) 04/26/2021    CHLORIDE 108 04/26/2021    CO2 18 (L) 04/26/2021    CO2 20 04/26/2021    BUN 38 (H) 04/26/2021    BUN 38 (H) 04/26/2021    CR 1.51 (H) 04/26/2021    CR 1.53 (H) 04/26/2021     (H) 04/26/2021     (H) 04/26/2021     COAGS:   Lab Results   Component Value Date    PTT 26 04/25/2021    INR 1.18 (H) 04/25/2021     POC:   Lab Results   Component Value Date     (H) 04/26/2021    HCG Negative 09/22/2008     HEPATIC:   Lab Results   Component Value Date    ALBUMIN 2.0 (L) 04/23/2021    PROTTOTAL 6.1 (L) 04/23/2021    ALT 15 04/23/2021    AST 16 04/23/2021    ALKPHOS 169 (H) 04/23/2021    BILITOTAL 0.5 04/23/2021    BILIDIRECT .0@ 03/10/2005     OTHER:   Lab Results   Component Value Date    PH 7.30 (L) 04/25/2021    LACT 0.9 04/25/2021    A1C 5.6 04/08/2021    KAYKAY 8.4 (L) 04/26/2021    PHOS 4.6 (H) 04/26/2021    MAG 2.0 04/26/2021    LIPASE 56 10/23/2009    TSH 1.97 04/08/2021    SED 16 06/12/2019       Anesthesia Plan    ASA Status:  3   NPO Status:  NPO Appropriate    Anesthesia Type: MAC.     - Reason for MAC: straight local not clinically adequate              Consents    Anesthesia Plan(s) and associated risks, benefits, and realistic alternatives discussed. Questions answered and patient/representative(s) expressed understanding.     - Discussed with:  Patient      - Extended Intubation/Ventilatory Support  Discussed: No.      - Patient is DNR/DNI Status: No    Use of blood products discussed: Yes.     - Discussed with: Patient.     - Consented: consented to blood products            Reason for refusal: other.     Postoperative Care       PONV prophylaxis: Ondansetron (or other 5HT-3)     Comments:                Erick Esqueda MD

## 2021-04-26 NOTE — PROGRESS NOTES
The patient continues to be under the care of primary vascular surgery.  The patient is physically located in the intensive care unit and under comanagement/consultation with the ICU.  Internal Medicine consultation will be discontinued since she is under ICU care. We are happy to be involved again once the patient is not under the care of ICU. I discussed this with vascular surgery.

## 2021-04-26 NOTE — OR NURSING
Decision made by Cardiology MD to go to the Cath lab. Aspirin 325 mg PO given. Maribel was beginning to note some chest pressure shortly before she went. VSS. The Wound Vac from surgery was reporting a leak, there was some time spent fixing this by Dr. Salgado, but it remained unresolved when she departed.

## 2021-04-26 NOTE — ANESTHESIA POSTPROCEDURE EVALUATION
Patient: Maribel Yang    Procedure(s):  Axplantation infected graft, femoral to femoral bypass with cadaveric artery, bilateral SATORIUS MUSCLE FLAP CREATION, evacuation of absece, vacuum closure    Diagnosis:Wound infection [T14.8XXA, L08.9]  Diagnosis Additional Information: No value filed.    Anesthesia Type:  General    Note:  Disposition: ICU            ICU Sign Out: Anesthesiologist/ICU physician sign out WAS performed   Postop Pain Control: Uneventful            Sign Out: Well controlled pain   PONV: No   Neuro/Psych: Uneventful            Sign Out: Acceptable/Baseline neuro status   Airway/Respiratory: Uneventful            Sign Out: Acceptable/Baseline resp. status   CV/Hemodynamics:    Other NRE: NONE   DID A NON-ROUTINE EVENT OCCUR? No    Event details/Postop Comments:  Upon arrival to PACU, the patient underwent work-up for suspected myocardial infarction.  EKG was remarkable for ST depression in multiple leads and ST elevation in lead II and aVF and cardiology was consulted.  Troponin drawn upon arrival to PACU also resulted positive and in conjunction with cardiology and the vascular surgery team the patient was given a dose of Brilinta and aspirin and started on a heparin infusion.  She will be taken from the PACU to the Cath Lab after which she will proceed to ICU.  The cardiology fellow, vascular surgery fellow, and myself all communicated directly with the ICU staff regarding care of this patient.           Last vitals:  Vitals:    04/25/21 2200 04/25/21 2215 04/25/21 2230   BP: (!) 127/96 (!) 134/93 138/84   Pulse: 95 93 86   Resp: 12 14 12   Temp:      SpO2:          Last vitals prior to Anesthesia Care Transfer:  CRNA VITALS  4/25/2021 2045 - 4/25/2021 2145      4/25/2021             NIBP:  134/87    Ht Rate:  81    SpO2:  100 %    EKG:  NSR          Electronically Signed By: Christopher Hernández MD  April 25, 2021  10:55 PM

## 2021-04-26 NOTE — PROGRESS NOTES
Paged by ECG tech for post-op ECG concerning for STEMI.     70 year old woman with recent inferior STEMI requiring LIUDMILA to RCA after high risk vascular surgery 3 weeks ago now with recurrences of inferior RHEA elevations and anterolateral depressions on post-op ECG in PACU.    Discussed with vascular fellow and anesthesia attending, who determined patient was safe for cardiac catheterization from a post-operative complication standpoint.     Discussed ECG findings with cardiology consult attending who saw patient yesterday, and with interventional attending on call. Interventional attending felt cath lab activation was warranted to evaluation stent patency. Of note, morning Brillinta dose held against recommendations of cardiology consult service. Patient will be given full dose ASA right now, and evening Brillinta administered.    Patient to be taken to cath lab soon for diagnostic angiogram and possible PCI.     Max White MD, MSc  Cardiovascular Disease Fellow  [facility]

## 2021-04-26 NOTE — PLAN OF CARE
Major Shift Events:    Pt a/o and follows commands appropriately. Moves extremities equally. Map goal >65 and may change during day. Maintain nitroglycerin drip for NSTEMI and CP prevention per provider. 4L NC. Clear liq diet. Villavicencio in place with 30 output q hr. Spoke with day team about albumin bolus. Artline is very positional. nitroglycerin is at 30 mcg. Wound discovered in coccyx and WOC consult ordered.  Plan:   Maintain nitroglycerin drip  hydrate  For vital signs and complete assessments, please see documentation flowsheets.

## 2021-04-26 NOTE — CONSULTS
[facility]  Transplant Nephrology Consult  Date of Admission:  4/23/2021  Today's Date: 04/26/2021  Requesting physician: Raven Lewis MD    Recommendations:  - continue MPA and prednisone 5 mg, hold prednisone if she is on dexamethasone.  - Renal graft function is stable, will continue to follow.     Assessment & Plan   # LDKT: Stable   - Baseline Creatinine: ~ 1.3-1.6   - Proteinuria: Not checked recently   - Date DSA Last Checked: Apr/2018      Latest DSA: No   - BK Viremia: Not checked recently due to time from transplant   - Kidney Tx Biopsy: No    # Immunosuppression: Mycophenolic acid (dose 360 mg every 12 hours) and Prednisone (dose 5 mg daily)   - Changes: No    # Infection Prophylaxis:   - PJP: None    # Hypertension: Controlled;  Goal BP: < 130/80   - Volume status: Euvolemic     - Changes: No    # Anemia in Chronic Renal Disease: Hgb: Stable      JONATHAN: No   - Iron studies: Not checked recently    # Mineral Bone Disorder:   - Vitamin D; level: Not checked recently        On supplement: No  - Calcium; level: Normal        On supplement: No    # Electrolytes:   - Potassium; level: Normal        On supplement: No  - Bicarbonate; level: Normal        On supplement: No    # infrarenal AAA, s/p EVAR with fem-fem bypass on 4/6 c/b infected R femoral graft s/p  Debridement. management per surgery.    # Transplant History:  Etiology of Kidney Failure: Alport's syndrome  Tx: LDKT  Transplant: 9/20/2004 (Kidney)  Significant changes in immunosuppression: None  Significant transplant-related complications: Recurrent Skin Cancers    Recommendations were communicated to the primary team via this note.    Seen and discussed with Dr. Pili De lAngel MD  Pager: [phone]    REASON FOR CONSULT   IS management.    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 Pt was seen today after she came out of the OR for wound vac. She denies any active issues. Denies any SOB, nausea or vomiting.     Review of Systems    The 10 point Review of Systems is negative other than noted in the HPI or here.     Past Medical History    I have reviewed this patient's medical history and updated it with pertinent information if needed.   Past Medical History:   Diagnosis Date     Abnormal coagulation profile     p [id number] heterozygote      Age-related osteoporosis without current pathological fracture 6/22/2019     Anemia      Antiplatelet or antithrombotic long-term use      ASCUS with positive high risk HPV 2007, 2015    + HPV 56, 54,& 6, colp - TAL III, Leep =TAL II     Basal cell carcinoma      Depressive disorder July 2015     Hypertension      Immunosuppressed status (H)     due meds     Kidney replaced by transplant 9/04    Living donor recipient,  Rejection 7/2005     LSIL (low grade squamous intraepithelial lesion) on Pap smear 4/2013    +HPV 33 or 45, 61       PAD (peripheral artery disease) (H)      PONV (postoperative nausea and vomiting)      Squamous cell lung cancer (H)      Thrombosis of leg 1967     Unspecified disorder of kidney and ureter     X-linked dominant Alport's syndrome.       Past Surgical History   I have reviewed this patient's surgical history and updated it with pertinent information if needed.  Past Surgical History:   Procedure Laterality Date     BIOPSY      Kidney, Lung, Breast     BIOPSY ANAL N/A 3/14/2018    Procedure: BIOPSY ANAL;;  Surgeon: Shabbir Leo MD;  Location:  OR       TRANSPLANTATION OF KIDNEY  9/04    recipient -- done at [facility]     COLONOSCOPY       COLONOSCOPY N/A 8/9/2017    Procedure: COMBINED COLONOSCOPY, SINGLE OR MULTIPLE BIOPSY/POLYPECTOMY BY BIOPSY;;  Surgeon: Sushil Hyatt MD;  Location:  GI     COLPOSCOPY,LOOP ELECTRD CERVIX EXCIS  03/11/08    TAL II     CONIZATION LEEP  7/17/2013    Procedure: CONIZATION LEEP;;  Surgeon: Liliana Renteria MD;  Location: [facility] OR     CONIZATION LEEP N/A 8/17/2016    Procedure: CONIZATION LEEP;  Surgeon: Liliana Renteria MD;  Location:  OR     CV CORONARY ANGIOGRAM N/A 4/6/2021    Procedure: CV CORONARY ANGIOGRAM;  Surgeon: Sincere Rosas MD;  Location:  HEART CARDIAC CATH LAB     CV CORONARY ANGIOGRAM N/A 4/25/2021    Procedure: Coronary Angiogram;  Surgeon: Sincere Rosas MD;  Location:  HEART CARDIAC CATH LAB     CV PCI STENT DRUG ELUTING N/A 4/6/2021    Procedure: Percutaneous Coronary Intervention Stent Drug Eluting;  Surgeon: Sincere Rosas MD;  Location:  HEART CARDIAC CATH LAB     ENDOVASCULAR REPAIR ANEURYSM AORTOILIAC Bilateral 4/6/2021    Procedure: Endovascular Abdominal Aortic Aneurysm Repair with Aortouniiliac Device and Femoral-Femoral Artery Bypass with [id number] Artegraft;  Surgeon: Abena Ferrara MD;  [facility]     EXAM UNDER ANESTHESIA ANUS  7/15/2014    Procedure: EXAM UNDER ANESTHESIA ANUS;  Surgeon: Radha Musa MD;  Location: [facility] OR     EXAM UNDER ANESTHESIA ANUS N/A 3/14/2018    Procedure: EXAM UNDER ANESTHESIA ANUS;  Anal Exam Under Anesthesia With Excision of anal lesion, proctoscopy;  Surgeon: Shabbir Leo MD;  Location:  OR     EYE SURGERY       GENITOURINARY SURGERY       IR OR ANGIOGRAM  4/6/2021     IRRIGATION AND DEBRIDEMENT GROIN Right 4/24/2021    Procedure: IRRIGATION AND DEBRIDEMENT, INGUINAL REGION, I & D PF RIGHT GROIN;  Surgeon: Raven Lewis MD;  Location:  OR     LASER CO2 EXCISE VULVA WIDE LOCAL   7/15/2014    Procedure: LASER CO2 EXCISE VULVA WIDE LOCAL;  Surgeon: Liliana Renteria MD;  [facility]     LASER CO2 VAGINA  2013    Procedure: LASER CO2 VAGINA;;  Surgeon: Liliana Renteria MD;  [facility]     LASER CO2 VAGINA N/A 2018    Procedure: LASER CO2 VAGINA;  Exam Under Anesthesia, CO2 Laser Ablation of Upper Vagina and Cervix;  Surgeon: Pati Garcia MD;  [facility]     MICROSCOPY ANAL  2013    Procedure: MICROSCOPY ANAL;  Anal Microscopy,  EUA vagina,Colposcopy Of Vagina And Vulva, Vaginal Biopsies, Omniguide Co2 Laser To Vagina and vulva, Loop Electrosurgical Excision Procedure To Cervix;  Surgeon: Radha Musa MD;  [facility]     MICROSCOPY ANAL  7/15/2014    Procedure: MICROSCOPY ANAL;  Surgeon: Radha Musa MD;  [facility]     VASCULAR SURGERY      Thrombectomy     ZZC NONSPECIFIC PROCEDURE      Thrombectomy     ZZC NONSPECIFIC PROCEDURE   and     Bilater eye surgery - correction for crossed eyes     ZZC NONSPECIFIC PROCEDURE      oopherectomy L     ZZC NONSPECIFIC PROCEDURE      open kidney biopsy - L       Family History   I have reviewed this patient's family history and updated it with pertinent information if needed.   Family History   Problem Relation Age of Onset     Diabetes Father         type 2 diag age,60's     Alcohol/Drug Father      Arthritis Father      Hypertension Father      Lipids Father         high cholesterol     Arthritis Mother      Diabetes Mother      Depression Mother      Heart Disease Mother      Neurologic Disorder Mother      Obesity Mother      Psychotic Disorder Mother      Thyroid Disease Mother      Hypertension Mother      Gynecology Sister         Precancerous cell removal from cervix at age 45     Depression Sister      Allergies Sister      Alcohol/Drug Sister      Neurologic Disorder Sister      Cerebrovascular Disease Paternal Grandmother          of a stroke in her  80's     Diabetes Paternal Grandmother      Alcohol/Drug Son      Colon Polyps Sister      Breast Cancer Niece      Other Cancer Sister         Cervical     Obesity Sister      Depression Sister      Substance Abuse Son      Substance Abuse Sister      Asthma Other      Colon Cancer No family hx of      Crohn's Disease No family hx of      Ulcerative Colitis No family hx of      Melanoma No family hx of      Skin Cancer No family hx of        Social History   I have reviewed this patient's social history and updated it with pertinent information if needed. Maribel Yang  reports that she has quit smoking. Her smoking use included cigarettes. She started smoking about 54 years ago. She has a 10.50 pack-year smoking history. She has never used smokeless tobacco. She reports previous alcohol use. She reports previous drug use. Drug: Marijuana.    Allergies   Allergies   Allergen Reactions     Blood Transfusion Related (Informational Only) Other (See Comments)     Patient has a history of a clinically significant antibody against RBC antigens.  A delay in compatible RBCs may occur.     Ultracet Nausea and Vomiting and Hives     Hydrocodone Nausea and Vomiting and Hives     Prior to Admission Medications     aspirin  81 mg Oral Daily     atorvastatin  80 mg Oral Daily     fluticasone  2 spray Both Nostrils Daily     isosorbide mononitrate  30 mg Oral Daily     [Held by provider] lisinopril  5 mg Oral Daily     melatonin  6 mg Oral At Bedtime     mycophenolic acid  360 mg Oral BID     piperacillin-tazobactam  2.25 g Intravenous Q6H     polyethylene glycol  17 g Oral Daily     predniSONE  5 mg Oral Daily     sodium chloride (PF)  10 mL Intravenous Once     sodium chloride (PF)  3 mL Intracatheter Q8H     ticagrelor  90 mg Oral BID     vancomycin (VANCOCIN) IV  750 mg Intravenous Q24H       sodium chloride Stopped (21 1227)       Physical Exam   Temp  Av.6  F (37  C)  Min: 97.2  F (36.2  C)  Max: 100.7  F  "(38.2  C)  Arterial Line BP  Min: 49/43  Max: 172/90  Arterial Line MAP (mmHg)  Av.1 mmHg  Min: 48 mmHg  Max: 131 mmHg      Pulse  Av.6  Min: 50  Max: 95 Resp  Av.6  Min: 10  Max: 40  SpO2  Av.2 %  Min: 88 %  Max: 100 %     BP (!) 146/86   Pulse 77   Temp 98.1  F (36.7  C) (Axillary)   Resp 14   Ht 1.575 m (5' 2\")   Wt 47.8 kg (105 lb 6.1 oz)   SpO2 96%   BMI 19.27 kg/m     Date 21 07 - 21 0659   Shift 5463-4603 6962-0926 1829-7904 24 Hour Total   INTAKE   P.O. 100   100   I.V. 227.8   227.8   Blood Components 300   300   Shift Total(mL/kg) 627.8(13.13)   627.8(13.13)   OUTPUT   Urine 420   420   Blood 20   20   Shift Total(mL/kg) 440(9.21)   440(9.21)   Weight (kg) 47.8 47.8 47.8 47.8      Admit Weight: 46.2 kg (101 lb 14.4 oz)     GENERAL APPEARANCE: alert and no distress  HENT: mouth without ulcers or lesions  LYMPHATICS: no cervical or supraclavicular nodes  RESP: lungs clear to auscultation - no rales, rhonchi or wheezes  CV: regular rhythm, normal rate, no rub, no murmur  EDEMA: no LE edema bilaterally  ABDOMEN: soft, ND  MS: extremities normal - no gross deformities noted, no evidence of inflammation in joints, no muscle tenderness  SKIN: no rash    Data   CMP  Recent Labs   Lab 21  1328 21  0459 21  0157 21  2125 21  1935 21  193    140 136 139   < > 132*   POTASSIUM 4.1 4.4 4.5 4.2   < > 4.4   CHLORIDE 111* 112* 108 111*   < > 102   CO2 18* 18* 20 19*   < > 21   ANIONGAP 10 11 8 9   < > 9   * 138* 136* 143*   < > 112*   BUN 41* 38* 38* 33*   < > 36*   CR 1.58* 1.51* 1.53* 1.35*   < > 1.63*   GFRESTIMATED 33* 35* 35* 40*   < > 32*   GFRESTBLACK 38* 41* 40* 47*   < > 37*   KAYKAY 8.4* 8.4* 8.8 8.3*   < > 9.2   MAG 1.9 2.0  --  2.0  --   --    PHOS 4.5 4.6*  --  4.1  --   --    PROTTOTAL  --   --   --   --   --  6.1*   ALBUMIN  --   --   --   --   --  2.0*   BILITOTAL  --   --   --   --   --  0.5   ALKPHOS  --   --   --   " "--   --  169*   AST  --   --   --   --   --  16   ALT  --   --   --   --   --  15    < > = values in this interval not displayed.     CBC  Recent Labs   Lab 04/26/21  1328 04/26/21  0459 04/26/21  0157 04/25/21 2125   HGB 8.5* 8.2* 8.3* 10.0*   WBC 15.1* 13.4* 14.7* 14.2*   RBC 3.07* 2.82* 2.91* 3.40*   HCT 26.5* 25.3* 26.2* 31.4*   MCV 86 90 90 92   MCH 27.7 29.1 28.5 29.1   MCHC 32.1 32.4 31.7 32.6   RDW 17.6* 16.1* 15.9* 15.9*    434 454* 544*     INR  Recent Labs   Lab 04/25/21  2200   INR 1.18*   PTT 26     ABG  Recent Labs   Lab 04/25/21 2011 04/25/21  1912 04/24/21  1313   PH 7.30* 7.32* 7.46*   PCO2 43 39 29*   PO2 110* 124* 58*   HCO3 21 20* 21   O2PER 0.44 43.0 30.0      Urine Studies  Recent Labs   Lab Test 01/20/14  0841   COLOR Light Yellow   APPEARANCE Clear   URINEGLC Negative   URINEBILI Negative   URINEKETONE Negative   SG 1.013   UBLD Negative   URINEPH 5.0   PROTEIN Negative   NITRITE Negative   LEUKEST Moderate*   RBCU 1   WBCU 2     Recent Labs   Lab Test 04/24/18  1812 01/31/17  1030 06/03/16  0900 10/22/15  1024 06/25/15  1007 01/14/15  0902 04/07/14  0921   UTPG 1.04* 1.67* 0.76* 0.44* 0.31* 0.27* 0.05     PTH  Recent Labs   Lab Test 06/12/19  1810   PTHI 89*     Iron Studies  No lab results found.    IMAGING:  All imaging studies reviewed by me.  Physician Attestation   I, Flaca Alejandre MD, saw this patient with the resident and agree with the resident/fellow's findings and plan of care as documented in the note.      I personally reviewed vital signs, medications, labs and imaging.    Key findings: 71 yo female with ESKD 2/2 Alport s/p LDKTx 2004, post tx course complicated by multiple Ca including skin Ca, lung Ca s/p SBRT 2014, anal Ca s/p chemoradiation 2018, AAA s/p EVAR w/ fem-fem bypass, postop course c/b inf STEMI s/p LIUDMILA to RCA, postop endovascular infection s/p debridement. Stable graft fun. Current IS:  mg po bid \" switched from sirolimus periop\" and prednisone " 5.    Flaca Alejandre MD  Date of Service (when I saw the patient): 04/26/21

## 2021-04-26 NOTE — OR NURSING
Post op EKG resulted acute MI. Anesthesia notified and at bedside in PACU.    Vascular surgery and cardiology fellow at bedside to discuss acute MI result. Troponin sent. Oral brilinta given. Patient asymptomatic. Continue to monitor.

## 2021-04-26 NOTE — BRIEF OP NOTE
[facility]    Brief Operative Note    Pre-operative diagnosis:   Right groin wound infection     Post-operative diagnosis:   Bilateral groin wound infection; infected femoral to femoral bypass     Procedure: Procedure(s):  Axplantation infected graft, femoral to femoral bypass with cadaveric artery, bilateral SATORIUS MUSCLE FLAP CREATION, evacuation of absece, vacuum closure  Surgeon: Surgeon(s) and Role:     * Raven Lewis MD - Primary     * Devendra Salgado MD - Fellow - Assisting  Anesthesia: General   Estimated blood loss: Minimal  Drains: Veraflow wound vac   Specimens:   ID Type Source Tests Collected by Time Destination   1 : Left groin wound abscess Wound Other ABSCESS CULTURE AEROBIC BACTERIAL, ANAEROBIC BACTERIAL CULTURE, FUNGUS CULTURE, GRAM STAIN Raven Lewis MD 4/25/2021  6:34 PM    2 : infected graft Wound Other ANAEROBIC BACTERIAL CULTURE, FUNGUS CULTURE, GRAM STAIN, WOUND CULTURE AEROBIC BACTERIAL (Canceled) Raven Lewis MD 4/25/2021  6:36 PM    A : infected graft Other (specify in comments) Other SURGICAL PATHOLOGY EXAM Raven Lewis MD 4/25/2021  6:37 PM    B : infected graft Other (specify in comments) Other SURGICAL PATHOLOGY EXAM Raven Lewis MD 4/25/2021  8:11 PM      Findings:   No significant expressible purulence. Debridement of some of the muscle of the left sartorius muscle flap. Application of Vera flow wound vac therapy     Complications: None.  Implants:   Implant Name Type Inv. Item Serial No.  Lot No. LRB No. Used Action   CyroArtery Femoral- Popliteal Artery Human Allograft   [id number]   Right 1 Implanted

## 2021-04-26 NOTE — PROGRESS NOTES
"VASCULAR SURGERY PROGRESS NOTE    Subjective:  Sitting comfortably in bed in no acute distress.     Objective:No intake or output data in the 24 hours ending 04/24/21 1108  PHYSICAL EXAM:  BP (!) 135/92   Pulse 75   Temp 97.5  F (36.4  C)   Resp 18   Ht 1.575 m (5' 2\")   Wt 47.8 kg (105 lb 6.1 oz)   SpO2 97%   BMI 19.27 kg/m    General: The patient is alert and oriented. Appropriate. No acute distress  Psych: pleasant affect, answers questions appropriately  Respiratory: The patient does not require supplemental oxygen. Breathing unlabored  GI:  Abdomen soft, nontender to light palpation.  Extremities: Bilateral wound vacs in place in both groins; Doppler signals in bilateral DP/PT.       Imaging:   Reviewed CT scan.    ASSESSMENT:  69 yo F s/p EVAR AUI and left to right femoral to femoral bypass graft for 5.9 cm AAA. Initial post-op course was complicated by inferior STEMI requiring emergent cardiac catheterization and PCI to RCA. Patient presenting on 4/23/2021 to ED with subjective fevers and chills and erythema and drainage from right groin wound.     4/24/2021 - Incision and drainage of right groin wound; packing of the wound with Adaptic, moistened Kerlix gauze, ABD pad    4/25/2021 - Explantation of femoral to femoral bypass graft; femoral to femoral bypass graft using cadaveric homograft (tunneled retropubic); bilateral sartorius muscle flaps; placement of negative pressure wound vac therapy.     4/26/2021 - Cardiac catheterization for post-op EKG changes; RCA stent patent; diffuse vasospasm of LAD, LCx      PLAN:  - Plan to return to the OR today for wound washout and exchange of wound vac therapy  - NPO; mIVF  - Aspirin, brilinta, atorvastatin; metoprolol, lisinopril   - Appreciate cardiology recommendations - nitroglycerin gtt   - Continue broad spectrum antibiotics; ID consultation for long-term antibiotic recommendations   - ICU     Patient's  was updated on the plans for today. "     Discussed pt history, exam, assessment and plan with Dr. Lewis of the vascular surgery service, who is in agreement with the above.    Devendra Salgado MD  Division of Vascular Surgery   Pager: [phone]

## 2021-04-26 NOTE — CONSULTS
SOLID ORGAN INFECTIOUS DISEASES CONSULTATION     Patient:  Maribel Yang   YOB: 1950  Date of Visit:  04/26/2021  Date of Admission: 4/23/2021  Consult Requester:Raven Lewis MD          Assessment and Recommendations:   Recommendations:  - Check 2 sets of BC daily for now (please get second BC today)  - Continue Zosyn 2.25g q6h and Vancomycin (pharmacy to dose for AUC) for now, anticipate transition to cefazolin for likely prolonged course of 6 weeks from graft removal.  - Check ROLY given endovascular infection with Staph aureus  - Hold off on PICC placement until BC are negative at least 48-72 hours    Thank you for the consult. Transplant ID will continue to follow with you.     Delia Villalobos PA-C   Pronouns: she/her/hers  Infectious Diseases  Pager: [phone]  04/26/2021    Assessment:  Maribel Yang is a 70 year old female with PMH significant for ESRD s/p LDKT 9/20/2004 (previously on sirolimus transitioned 4/2/21 to MMF keri-operatively; prednisone), lung cancer s/p SBRT (2014), anal canal carcinoma s/p chemoradiation (6/2018), and 5.9cm AAA and occluded right external iliac artery s/p EVAR with femorofemoral bypass (4/6/21) c/b inferior STEMI s/p LIUDMILA to proximal RCA who was admitted on 4/23 due to endovascular infection at site of previous surgery.     She is now s/p right groin I&D on 4/24 as well as  explantation of the femorofemoral bypass graft, femoral to femoral bypass graft using cadaveric homograft, bilateral sartorius muscle flap and wound vac placement on 4/25. BC and I&D culture is growing Staph aureus. Verigene suggests MSSA on BC (mecA/B negative), so it is likely that wound/graft infection is the same organism. Right now sensitivities are pending and tissue cultures from 4/25 are relatively new, so we favor continuing Zosyn and Vanc while allowing cultures to mature for another 24 hours. We anticipate transitioning to MSSA directed therapy likely tomorrow  pending cultures. Due to endovascular infection, we anticipate a prolonged course of antibiotics of likely 6 weeks. However, would hold off on PICC line placement for now as we need to ensure blood has cleared prior to placing line. Given that organism grown on culture is Staph aureus, we would like to check ROLY to ensure no evidence of endocarditis. It is unknown exactly when she became bacteremic and if there is a large vegetation, she could qualify for surgical intervention.      Previous ID Issues:  - none known      Other ID issues:  - QTc interval:  477 msec on 4/23/21   - Pneumocystis prophylaxis:  none  - Viral serostatus: unknown  - Viral prophylaxis:  none  - Fungal prophylaxis:  none  - Immunosuppression: mycophenolic acid, prednisone  - Immunization status:  completed Moderna COVID-19 vaccine 2/23/21  - Gamma globulin status:  1,100 on 3/1/21  - Isolation status: standard precautions           History of Present Illness:   Transplants:  9/20/2004 (Kidney), Postoperative day:  6062     Maribel Yang is a 70 year old female with PMH significant for ESRD s/p LDKT 9/20/2004 (previously on sirolimus transitioned 4/2/21 to MMF keri-operatively; prednisone), lung cancer s/p SBRT (2014), anal canal carcinoma s/p chemoradiation (6/2018), and 5.9cm AAA and occluded right external iliac artery s/p EVAR with femorofemoral bypass (4/6/21) c/b inferior STEMI s/p LIUDMILA to proximal RCA who presented to the ED on 4/23 due to pain and swelling in right groin at site of prior procedure and fever.    She previously underwent endovascular abdominal aortic aneurysm repair with aorto uniiliac endovascular repair and femorofemoral bypass as well as plugging of the right common iliac artery to prevent endoleak on 4/6/2021. Post-op course was complicated by inferior STEMI for which she was taken emergently to the cath lab for PCI with LIUDMILA of the proximal RCA. She was able to be discharged to home on 4/10. She was initially  doing well at home, although did experience some low back pain. During virtual follow-up visit with vascular surgery on 4/19 it was reported that her incision was healing well and without redness, swelling or drainage. However, the following day she called regarding increased redness, pain, and warmth in the right groin near incision. She was prescribed Keflex on 4/22, however, symptoms worsened and she developed fever up to 103.6 at home which prompted her to report to ED.    In the ED she was noted to be afebrile with mild leukocytosis of 13.4. BC were taken and on Day 2 are growing MSSA in 1 of 2 cultures. She underwent right groin I&D on 4/24 and purulent fluid was seen surrounding right groin Artegraft and bypass graft. Culture was taken which is now growing Staph aureus. She then underwent explantation of the femorofemoral bypass graft, femoral to femoral bypass graft using cadaveric homograft, bilateral sartorius muscle flap and wound vac placement on 4/25. Cultures were collected and are currently pending. Following procedure she developed diffuse coronary vasospasm with complete occlusion of RCA that was reopened with nitroglycerin. She was transferred to SICU. She returned to OR 4/26 with no significant expressible purulence seen. She underwent debridement of some of the muscle of the left sartorius muscle flap with wound vac reapplied.      Antimicrobials:  Current  - Pip-tazo 4/24-  - Vancomycin 4/23-    Past  - Ceftriaxone 4/23           Review of Systems:   10 point review of systems was negative except for noted as above in HPI.            Past Medical History:     Past Medical History:   Diagnosis Date    Abnormal coagulation profile     p [id number] heterozygote     Age-related osteoporosis without current pathological fracture 6/22/2019    Anemia     Antiplatelet or antithrombotic long-term use     ASCUS with positive high risk HPV 2007, 2015    + HPV 56, 54,& 6, colp - TAL III, Leep =TAL II    Basal  cell carcinoma     Depressive disorder July 2015    Hypertension     Immunosuppressed status (H)     due meds    Kidney replaced by transplant 9/04    Living donor recipient,  Rejection 7/2005    LSIL (low grade squamous intraepithelial lesion) on Pap smear 4/2013    +HPV 33 or 45, 61      PAD (peripheral artery disease) (H)     PONV (postoperative nausea and vomiting)     Squamous cell lung cancer (H)     Thrombosis of leg 1967    Unspecified disorder of kidney and ureter     X-linked dominant Alport's syndrome.            Past Surgical History:     Past Surgical History:   Procedure Laterality Date    BIOPSY      Kidney, Lung, Breast    BIOPSY ANAL N/A 3/14/2018    Procedure: BIOPSY ANAL;;  Surgeon: Shabbir Leo MD;  Location:  OR     TRANSPLANTATION OF KIDNEY  9/04    recipient -- [facility]    COLONOSCOPY      COLONOSCOPY N/A 8/9/2017    Procedure: COMBINED COLONOSCOPY, SINGLE OR MULTIPLE BIOPSY/POLYPECTOMY BY BIOPSY;;  Surgeon: Sushil Hyatt MD;  Location:  GI    COLPOSCOPY,LOOP ELECTRD CERVIX EXCIS  03/11/08    TAL II    CONIZATION LEEP  7/17/2013    Procedure: CONIZATION LEEP;;  Surgeon: Liliana Renteria MD;  Location:  OR    CONIZATION LEEP N/A 8/17/2016    Procedure: CONIZATION LEEP;  Surgeon: Liliana Renteria MD;  Location:  OR    CV CORONARY ANGIOGRAM N/A 4/6/2021    Procedure: CV CORONARY ANGIOGRAM;  Surgeon: Sincere Rosas MD;  Location:  HEART CARDIAC CATH LAB    CV CORONARY ANGIOGRAM N/A 4/25/2021    Procedure: Coronary Angiogram;  Surgeon: Sincere Rosas MD;  Location:  HEART CARDIAC CATH LAB    CV PCI STENT DRUG ELUTING N/A 4/6/2021    Procedure: Percutaneous Coronary Intervention Stent Drug Eluting;  Surgeon: Sincere Rosas MD;  Location:  HEART CARDIAC CATH LAB    ENDOVASCULAR REPAIR ANEURYSM AORTOILIAC Bilateral 4/6/2021    Procedure: Endovascular Abdominal Aortic Aneurysm Repair with Aortouniiliac Device and  Femoral-Femoral Artery Bypass with [id number] Artegraft;  Surgeon: Abena Ferrara MD;  Location: [facility]    EXAM UNDER ANESTHESIA ANUS  7/15/2014    Procedure: EXAM UNDER ANESTHESIA ANUS;  Surgeon: Radha Musa MD;  Location: [facility]    EXAM UNDER ANESTHESIA ANUS N/A 3/14/2018    Procedure: EXAM UNDER ANESTHESIA ANUS;  Anal Exam Under Anesthesia With Excision of anal lesion, proctoscopy;  Surgeon: Shabbir Leo MD;  Location: [facility]    EYE SURGERY      GENITOURINARY SURGERY      IR OR ANGIOGRAM  4/6/2021    IRRIGATION AND DEBRIDEMENT GROIN Right 4/24/2021    Procedure: IRRIGATION AND DEBRIDEMENT, INGUINAL REGION, I & D PF RIGHT GROIN;  Surgeon: Raven Lewis MD;  Location: [facility]    LASER CO2 EXCISE VULVA WIDE LOCAL  7/15/2014    Procedure: LASER CO2 EXCISE VULVA WIDE LOCAL;  Surgeon: Liliana Renteria MD;  Location: [facility]    LASER CO2 VAGINA  7/17/2013    Procedure: LASER CO2 VAGINA;;  Surgeon: Liliana Renteria MD;  Location: [facility]    LASER CO2 VAGINA N/A 9/25/2018    Procedure: LASER CO2 VAGINA;  Exam Under Anesthesia, CO2 Laser Ablation of Upper Vagina and Cervix;  Surgeon: Pati Garcia MD;  Location: [facility]    MICROSCOPY ANAL  7/17/2013    Procedure: MICROSCOPY ANAL;  Anal Microscopy,  EUA vagina,Colposcopy Of Vagina And Vulva, Vaginal Biopsies, Omniguide Co2 Laser To Vagina and vulva, Loop Electrosurgical Excision Procedure To Cervix;  Surgeon: Radha Musa MD;  Location: [facility]    MICROSCOPY ANAL  7/15/2014    Procedure: MICROSCOPY ANAL;  Surgeon: Radha Musa MD;  Location: [facility]    VASCULAR SURGERY      Thrombectomy    ZZC NONSPECIFIC PROCEDURE      Thrombectomy    ZZC NONSPECIFIC PROCEDURE  1955 and 1959    Bilater eye surgery - correction for crossed eyes    ZZC NONSPECIFIC PROCEDURE  1998    oopherectomy L    ZZC NONSPECIFIC PROCEDURE  1967    open kidney biopsy - L            Family History:   Reviewed and non-contributory.   Family History   Problem Relation Age  of Onset    Diabetes Father         type 2 diag age,60's    Alcohol/Drug Father     Arthritis Father     Hypertension Father     Lipids Father         high cholesterol    Arthritis Mother     Diabetes Mother     Depression Mother     Heart Disease Mother     Neurologic Disorder Mother     Obesity Mother     Psychotic Disorder Mother     Thyroid Disease Mother     Hypertension Mother     Gynecology Sister         Precancerous cell removal from cervix at age 45    Depression Sister     Allergies Sister     Alcohol/Drug Sister     Neurologic Disorder Sister     Cerebrovascular Disease Paternal Grandmother          of a stroke in her 80's    Diabetes Paternal Grandmother     Alcohol/Drug Son     Colon Polyps Sister     Breast Cancer Niece     Other Cancer Sister         Cervical    Obesity Sister     Depression Sister     Substance Abuse Son     Substance Abuse Sister     Asthma Other     Colon Cancer No family hx of     Crohn's Disease No family hx of     Ulcerative Colitis No family hx of     Melanoma No family hx of     Skin Cancer No family hx of             Social History:     Social History     Tobacco Use    Smoking status: Former Smoker     Packs/day: 0.30     Years: 35.00     Pack years: 10.50     Types: Cigarettes     Start date: 1967    Smokeless tobacco: Never Used    Tobacco comment: Couple times a week. 1-2 cigs 1-2x a week.   Substance Use Topics    Alcohol use: Not Currently     Alcohol/week: 0.0 standard drinks     Frequency: Monthly or less     Drinks per session: 1 or 2     Binge frequency: Less than monthly     Comment: rarely     History   Sexual Activity    Sexual activity: Not Currently    Partners: Male    Birth control/ protection: Abstinence     Comment: 25 years of marriage            Current Medications:      aspirin  81 mg Oral Daily    atorvastatin  80 mg Oral Daily    fluticasone  2 spray Both Nostrils Daily    [Held by provider] lisinopril  5 mg Oral Daily    melatonin  6 mg Oral  At Bedtime    mycophenolic acid  360 mg Oral BID    piperacillin-tazobactam  2.25 g Intravenous Q6H    polyethylene glycol  17 g Oral Daily    predniSONE  5 mg Oral Daily    sodium chloride (PF)  3 mL Intracatheter Q8H    ticagrelor  90 mg Oral BID    vancomycin (VANCOCIN) IV  750 mg Intravenous Q24H            Allergies:     Allergies   Allergen Reactions    Blood Transfusion Related (Informational Only) Other (See Comments)     Patient has a history of a clinically significant antibody against RBC antigens.  A delay in compatible RBCs may occur.    Ultracet Nausea and Vomiting and Hives    Hydrocodone Nausea and Vomiting and Hives            Physical Exam:   Vitals were reviewed  Temp:  [97.4  F (36.3  C)-97.8  F (36.6  C)] 97.5  F (36.4  C)  Pulse:  [60-95] 71  Resp:  [12-36] 18  BP: ()/(51-96) 146/86  MAP:  [73 mmHg-121 mmHg] 103 mmHg  Arterial Line BP: ()/() 147/75  SpO2:  [88 %-99 %] 97 %    Vitals:    04/23/21 1918 04/23/21 1928 04/26/21 0500   Weight: 46.2 kg (101 lb 14.4 oz) 46.2 kg (101 lb 14.4 oz) 47.8 kg (105 lb 6.1 oz)       Constitutional: Adult female seen lying in bed, in NAD. Awake, alert, interactive.  HEENT: NC/AT, EOMI, sclera clear, conjunctiva normal, nasal cannula in place, OP with MMM  Respiratory: No increased work of breathing, CTAB, no crackles or wheezing.  Cardiovascular: RRR, no murmur noted. No peripheral edema.  GI: Normal bowel sounds, soft, non-distended and non-tender.  Skin: Warm, dry, well-perfused. Scattered bruising noted on upper extremities. Wound vac in place in b/l groin  Musculoskeletal: Extremities grossly normal, non-tender.   Neurologic: A&O. Answers questions appropriately, speech normal. Moves all extremities spontaneously.  Neuropsychiatric: Calm. Affect appropriate to situation.  Vascular access:  PIV on LUE CDI, non-tender, no surrounding erythema.           Laboratory Data:     Microbiology:  Culture Micro   Date Value Ref Range Status    04/25/2021 Culture negative monitoring continues  Preliminary   04/25/2021 PENDING  Preliminary   04/25/2021 Culture negative monitoring continues  Preliminary   04/25/2021 PENDING  Preliminary   04/24/2021 (A)  Preliminary    Moderate growth  Staphylococcus aureus  Susceptibility testing in progress     04/24/2021 Culture negative monitoring continues  Preliminary   04/23/2021 No growth after 3 days  Preliminary   04/23/2021 (A)  Preliminary    Cultured on the 2nd day of incubation:  Gram positive cocci in clusters     04/23/2021   Preliminary    Critical Value/Significant Value, preliminary result only, called to and read back by  JESUS ALBERTO HOLCOMB RN ON 4.25.21 @ 1626. DT     04/23/2021 Susceptibility testing in progress  Preliminary   04/23/2021   Preliminary    (Note)  POSITIVE for STAPHYLOCOCCUS AUREUS and NEGATIVE for the mecA gene  (not MRSA) by [employer]Westig[employer] multiplex nucleic acid test. The mecA gene was  not detected. Final identification and antimicrobial susceptibility  testing will be verified by standard methods.    Specimen tested with Verigene multiplex, gram-positive blood culture  nucleic acid test for the following targets: Staph aureus, Staph  epidermidis, Staph lugdunensis, other Staph species, Enterococcus  faecalis, Enterococcus faecium, Streptococcus species, S. agalactiae,  S. anginosus grp., S. pneumoniae, S. pyogenes, Listeria sp., mecA  (methicillin resistance) and Juan/B (vancomycin resistance).    Critical Value/Significant Value called to and read back by JESUS ALBERTO HOLCOMB RN ON 4.25.21 @ 1902. DT     05/24/2016 No Beta Streptococcus isolated  Final   01/20/2014 No growth  Final   10/23/2009 No growth  Final   10/25/2008 No yeast isolated  Final   10/25/2008 No growth  Final   02/24/2006 No yeast isolated  Final   02/24/2006 No growth  Final   11/22/2005   Final    Normal melodie  No beta hemolytic Streptococcus Group A isolated   11/22/2005   Final    No growth  No Salmonella, Shigella,  Campylobacter or E coli 0:157 isolated.   11/07/2005 No Beta Streptococcus isolated  Final   08/09/2005 No growth  Final   07/20/2005 No growth  Final   07/18/2005 No yeast isolated  Final   07/18/2005 <10,000 colonies/mL Staphylococcus species  Final     Comment:     <10,000 colonies/mL Diptheroids  Multiple species present, probable perineal contamination.  Susceptibility testing not routinely done   07/14/2005 No growth  Final   06/24/2005 No yeast isolated  Final   06/24/2005 No growth  Final   05/04/2005 No yeast isolated  Final   05/04/2005 No growth  Final   04/28/2005 <10,000 colonies/mL Corynebacterium species  Final     Comment:     Susceptibility testing not routinely done   03/01/2005 No growth  Final       Inflammatory Markers    Recent Labs   Lab Test 06/12/19  1810   SED 16       Immune Globulin Studies  Recent Labs   Lab Test 03/01/21  1508 08/19/19  1044   IGG 1,100 1,030    155    119       Metabolic Studies       Recent Labs   Lab Test 04/26/21  0459 04/26/21  0157 04/25/21  2125 04/25/21 2011 04/25/21  1912 04/25/21  0511 04/23/21  1935 04/23/21  1935 04/13/21  1628 04/08/21  0506 04/08/21  0506    136 139 137 134 137   < > 132* 136   < > 141   POTASSIUM 4.4 4.5 4.2 4.7 4.2 4.1   < > 4.4 4.6   < > 3.6   CHLORIDE 112* 108 111*  --   --  109  --  102 105   < > 110*   CO2 18* 20 19*  --   --  19*  --  21 24   < > 24   ANIONGAP 11 8 9  --   --  9  --  9 7   < > 8   BUN 38* 38* 33*  --   --  33*  --  36* 31*   < > 24   CR 1.51* 1.53* 1.35*  --   --  1.46*  --  1.63* 1.60*   < > 1.79*   GFRESTIMATED 35* 35* 40*  --   --  37*  --  32* 33*   < > 29*   * 136* 143* 132* 128* 108*   < > 112* 112*   < > 117*   A1C  --   --   --   --   --   --   --   --   --   --  5.6   KAYKAY 8.4* 8.8 8.3*  --   --  8.5  --  9.2 8.9   < > 8.4*   PHOS 4.6*  --  4.1  --   --   --   --   --   --   --   --    MAG 2.0  --  2.0  --   --   --   --   --   --   --   --    LACT  --   --   --  0.9 0.7  --    <  > 1.2  --   --   --     < > = values in this interval not displayed.       Hepatic Studies    Recent Labs   Lab Test 04/23/21  1935 04/07/21  0809 03/01/21  1508 02/13/20  1228 08/19/19  1044 08/07/19  1312 06/12/19  1810 04/26/19  1312   BILITOTAL 0.5 0.2 0.3 0.3 0.4  --   --  0.2   ALKPHOS 169* 93 132 83 120  --  119 118   ALBUMIN 2.0* 2.2* 3.2* 3.3* 3.4 3.3* 3.4 3.1*   AST 16 40 17 16 16  --   --  14   ALT 15 14 21 26 20  -- 19 19   LDH  --   --   --   --  164  --   --   --        Pancreatitis testing    Recent Labs   Lab Test 04/08/21  0506 03/01/21  1508 02/13/20  1228 08/10/18  1414 05/14/18  1412 01/31/17  0748   TRIG 162* 154* 134 181* 156* 206*       Hematology Studies      Recent Labs   Lab Test 04/26/21  0459 04/26/21  0157 04/25/21 2125 04/25/21 2011 04/25/21  1912 04/25/21  0511 04/23/21  1935 04/23/21  1935 04/09/21  0520 04/08/21  0506 04/08/21  0506 04/07/21  0446 04/07/21  0446 03/01/21  1508 03/01/21  1508 02/13/20  1228   WBC 13.4* 14.7* 14.2*  --   --  12.8*  --  13.4* 10.3   < > 11.9*   < > 10.0   < > 5.8 6.0   ANEU  --   --   --   --   --   --   --  11.8* 8.8*  --  10.3*  --  8.3  --  4.9 4.5   ALYM  --   --   --   --   --   --   --  0.2* 0.4*  --  0.4*  --  0.5*  --  0.4* 0.5*   SAUNDRA  --   --   --   --   --   --   --  1.2 0.9  --  1.1  --  1.1  --  0.4 0.7   AEOS  --   --   --   --   --   --   --  0.0 0.1  --  0.0  --  0.0  --  0.1 0.2   HGB 8.2* 8.3* 10.0* 9.9* 9.8* 9.4*   < > 9.1* 9.0*   < > 7.5*   < > 9.3*   < > 13.0 13.7   HCT 25.3* 26.2* 31.4*  --   --  29.2*  --  29.5* 27.5*   < > 23.1*   < > 29.6*   < > 40.4 43.5    454* 544*  --   --  428  --  529* 97*   < > 105*   < > 132*   < > 287 228    < > = values in this interval not displayed.       Arterial Blood Gas Testing    Recent Labs   Lab Test 04/25/21 2011 04/25/21  1912 04/24/21  1313   PH 7.30* 7.32* 7.46*   PCO2 43 39 29*   PO2 110* 124* 58*   HCO3 21 20* 21   O2PER 0.44 43.0 30.0        Body Fluid Studies  Recent Labs    Lab Test 04/25/21  1836   GS No organisms seen  Rare  WBC'S seen         Drug Level Monitoring  Recent Labs   Lab Test 04/26/19  1312 04/23/18  0905 04/23/18  0905 01/13/16  1006 01/13/16  1006   CYCLSP  --   --   --   --  <25*   RAPAMY 3.9*   < > 4.4*   < >  --    MPACID  --   --  0.60*  --   --    MPAG  --   --  28.8*  --   --     < > = values in this interval not displayed.       Hepatitis C Testing     Hepatitis C Antibody   Date Value Ref Range Status   08/10/2018 Nonreactive NR^Nonreactive Final     Comment:     Assay performance characteristics have not been established for newborns,   infants, and children         Stool Studies  Recent Labs   Lab Test 07/11/17  0950 07/11/17  0948   POPRT Routine parasitology exam negative  Cryptosporidium, Cyclospora, and Microsporidia are not readily detected by this   method. A single negative specimen does not rule out parasitic infection.    --    EPCAMP  --  Not Detected   EPSALM  --  Not Detected   EPSHGL  --  Not Detected   EPVIB  --  Not Detected   EPROTA  --  Not Detected   EPNORO  --  Not Detected   EPYER  --  Not Detected   GIART Negative for Giardia lamblia specific antigen by immunoassay.  --        Last check of C difficile  C Diff Toxin B PCR   Date Value Ref Range Status   07/11/2017  NEG Final    Negative  Negative: Clostridium difficile target DNA sequences NOT detected, presumed   negative for Clostridium difficile toxin B or the number of bacteria present   may be below the limit of detection for the test.   FDA approved assay performed using [employer] GeneXpert real-time PCR.   A negative result does not exclude actual disease due to Clostridium difficile   and may be due to improper collection, handling and storage of the specimen or   the number of organisms in the specimen is below the detection limit of the   assay.         Respiratory Virus Testing    Recent Labs   Lab Test 04/23/21 2209   INFZA Negative   INFZB Negative       COVID-19  Testing  Recent Labs   Lab Test 04/23/21 2209 04/02/21  1056   [id number]  --  Nasopharyngeal   SARSCOVRES NEGATIVE NEGATIVE   [id number]  --  Nasopharyngeal   [id number]  --  Test received-See reflex to IDDL test SARS CoV2 (COVID-19) Virus RT-PCR       CMV DNA quant  Log IU/mL of CMVQNT   Date Value Ref Range Status   04/24/2018 Not Calculated <2.1 [Log_IU]/mL Final       EBV DNA quant  EBV DNA Copies/mL   Date Value Ref Range Status   02/15/2019 7,769 (A) EBVNEG^EBV DNA Not Detected [Copies]/mL Final   10/30/2018 2,938 (A) EBVNEG^EBV DNA Not Detected [Copies]/mL Final   08/10/2018 4,494 (A) EBVNEG^EBV DNA Not Detected [Copies]/mL Final   07/13/2018 61,980 (A) EBVNEG^EBV DNA Not Detected [Copies]/mL Final   06/11/2018 8,955 (A) EBVNEG^EBV DNA Not Detected [Copies]/mL Final   04/24/2018 8,233 (A) EBVNEG^EBV DNA Not Detected [Copies]/mL Final       BK virus  BK Virus Result   Date Value Ref Range Status   12/15/2009 <1000 <1000 copies/mL Final   10/02/2009 <1000 <1000 copies/mL Final   06/01/2009 <1000 <1000 copies/mL Final            Imaging:   TTE 4/26  Interpretation Summary  Moderately (EF 35-40%) reduced left ventricular function is present.  Hypokinesis of the inferior, basal anterior, basal inferoseptal and apical  walls with mid/basal septal aneurysm.  Right ventricular function, chamber size, wall motion, and thickness are  normal.  Small pericardial effusion, with a maximum diameter of 1.3 cm, adjacent to the  right atrium, with no hemodynamic significance.  A left pleural effusion is present.     This study was compared with the study from 4/24/2021. No significant changes  noted.    CXR 4/25  IMPRESSION:   1. Patchy right greater than left basilar opacities, atelectasis  versus pneumonia.  2. Trace left pleural effusion.

## 2021-04-26 NOTE — PROGRESS NOTES
Patient underwent successful coronary angiogram earlier this evening after developing post-operative inferior STEMI. Patient initially asymptomatic but later had mild symptoms of chest pressure.    Angiogram revealed single vessel CAD with patent proximal RCA stent and severe diffuse RCA vasospasm and mild LCA/LAD/LCx vasospasm. Vasospasm completely resolved with intracoronary nitroglycerin infusion. Stopped beta-blocker as this may worsen/provoke spasm. Trigger for spasm may have been intra-operative and post-operative catecholamine surges in setting of major vascular surgery. Nitroglycerin gtt initiated, titrate to alleviate chest pressure. Continue DAPT uninterrupted, cardiology will provide recs regarding DOAC initiation

## 2021-04-26 NOTE — PROGRESS NOTES
STAFF NOTE:  No major issues since return from cath lab    Exam awake, lucid, appropriate  Breathing easily  EKG T wave inversion, but no ST elevation    Labs troponin dramatic spike overnight  Hgb 8.2; transfused for this  Creatinine continues to increase, now 1.5, but this appears to be around her recent baseline  Chemistries show a non-gap metabolic acidosis in the context of mild hyperchloremia  Lactates wnl  Cultures from wound 4/24 growing staph aureus but without sensitivities    UOP adeqaute    CXR trace pleural effusion and basilar opacity    This is a 70 year old woman with recent inferior STEMI requiring LIUDMILA to RCA after high risk vascular surgery 3 weeks ago now with recurrences of inferior RHEA elevations and anterolateral depressions on post-op ECG in PACU after holding home Brilenta and aspirin prior to groin washout, redo fem-fem with cadaveric bypass, and flap.  Cardiac cathterization revealed diffuse coronary spasm with resolution after initiation of nitroglycerin.    She has been chest pain free.   She will get a vac change in OR today.  We will order a PICC in anticipation of long-term IV antibiotics and difficult vascular access, although I don't know if she'll be able to get it today with a questionably positive blood culture.      ACUTE PAIN:  -tylenol + oxycodone    PVD:  -s/p EVAR with perigraft sliding scale insulin at site of fem-fem bypass, now s/p groin washout, sartorius muscle flap and redo fem fem bypass with cadaveric artery   -apparently vascular service plans to change wound vac and repeat washout later this morning    NASAL CONGESTION:  -restart home decongestant    CORONARY VASOSPASM:  -anticipate transition to imdur tomorrow possibly; for now continue NTG drip  -no plans for anticoagulation  -hold metoprolol, can add low-dose oral diltiazem for HTN as a short-acting agent; will discuss with cardiology    CKD:  -hx OKT; on mycophenolate + prednisone 5 for  immunosuppression  -creatinine overall stable and UOP adequate    HLD:  -home lipitor    CONSTIPATION:  -restart home miralax    MISC:  -Code status is full code  -family updated by DAYANA  -SQH not necessary at this time; PPI not necessary, although she is on steroids + dual antiplatelet therapy.  -lines: will get PICC today given difficult vascular access and anticipated need for long-term IV antibiotics  -grayson not necessary  -anticipate discharge to home with IV antibiotics     Billing statement: 35min of critical care time; spent in an initial review of imaging, labs, physical exam, and discussion of the patient with my own team and the extended care team including the primary service; Based on this patient's presentation / recent intervention and my bedside assessment, I felt there was or is a reasonably high probability of imminent or life-threatening deterioration today or tonight for cardiogenic reasons.   My overall critical care time, as described in detail above, includes such things as coordination of care, arrhythmia and hemodynamics management with infusions of medicines, respiratory management, fluid therapy including fluid boluses, and pain and sedation therapy. This time excludes time I spent personally performing or supervising procedures for this patient.    JAIDEN Booth MD  Clinical   Anesthesia / Critical Care  *[phone]

## 2021-04-26 NOTE — H&P
SURGICAL ICU ADMISSION NOTE  4/25/2021      ASSESSMENT: Maribel Yang is a 70 year old female with a history of infrarenal AAA, kidney transplant, s/p EVAR with fem-fem bypass on 4/6 c/b inferior MI with LIUDMILA in the RCA. Admitted on 4/23 with infected R femoral graft. Now POD # 0 s/p groin washout, sartorius muscle flap, femoral femoral bypass with cadaveric artery, found to have diffuse coronary vasospasm post op.    PLAN:   Neuro/ pain/ sedation:  - Monitor neurological status. Notify the MD/DO for any acute changes in exam.  - Tylenol, oxy for pain.     Pulmonary care:   # Lung cancer in remission  - Supplemental oxygen to keep saturation above 92 %.  - Incentive spirometer every 15- 30 minutes when awake.     Cardiovascular:  # AAA, TAA s/p EVAR  # Fem-fem bypass c/b groin wound s/p washout and new bypass  # Inferior MI s/p LIUDMILA  # Diffuse coronary vasospasm  # HTN  - Brillinta held pre-op, re-started POD0 on 4/25  - ASA  - PTA lisinopril, metoprolol held  - Nitroglycerin gtt titrated to no ST elevation on telemetry  - EKG in AM  - Troponin q6h x3     GI care/ Nutrition:   - NPO  - Miralax     Fluids/ Electrolytes/ renal:   # S/p kidney transplant  # AMY  - Baseline Cr ~ 1.3-1.6  - Transplant nephrology consult in AM     Endocrine:    - No management indication     ID/ Antibiotics:  # Immunosuppression  # Infected graft  - Zosyn/vanc  - Fluid cultures pending  - PTA prednisone resumed, sirolimus transitioned to MMF for keri-op period     Heme:   # Acute post op blood loss    - EBL 1 L  - Brillinta, aspirin     Prophylaxis:    - Mechanical prophylaxis for DVT     Lines/ tubes/ drains:  - A line  - Wound vac bilateral groin     Disposition:  - Surgical ICU    CODE:  - Full    - - - - - - - - - - - - - - - - - - - - - - - - - - - - - - - - - - - - - - - - - - - - - - - - - - - - - - - - - - - - - - - - - - - - -     PRIMARY TEAM: Vascular  PRIMARY PHYSICIAN: Joshua    REASON FOR CRITICAL CARE ADMISSION:  Hemodynamic monitoring after MI   ADMITTING PHYSICIAN: Edmundo    HISTORY PRESENTING ILLNESS: Maribel Yang is a 70 year old female with a history of infrarenal AAA, kidney transplant, s/p EVAR with fem-fem bypass on 4/6 c/b inferior MI with LIUDMILA in the RCA. Admitted on 4/23 with infected R femoral graft. Now POD # 0 s/p groin washout, sartorius muscle flap, femoral femoral bypass with cadaveric artery, found to have diffuse coronary vasospasm post op.    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 heterozygote      Age-related osteoporosis without current pathological fracture 6/22/2019     Anemia      Antiplatelet or antithrombotic long-term use      ASCUS with positive high risk HPV 2007, 2015    + HPV 56, 54,& 6, colp - TAL III, Leep =TAL II     Basal cell carcinoma      Depressive disorder July 2015     Hypertension      Immunosuppressed status (H)     due meds     Kidney replaced by transplant 9/04    Living donor recipient,  Rejection 7/2005     LSIL (low grade squamous intraepithelial lesion) on Pap smear 4/2013    +HPV 33 or 45, 61       PAD (peripheral artery disease) (H)      PONV (postoperative nausea and vomiting)      Squamous cell lung cancer (H)      Thrombosis of leg 1967     Unspecified disorder of kidney and ureter     X-linked dominant Alport's syndrome.       SURGICAL HISTORY:   Past Surgical History:   Procedure Laterality Date     BIOPSY      Kidney, Lung, Breast     BIOPSY ANAL N/A 3/14/2018    Procedure: BIOPSY ANAL;;  Surgeon: Shabbir Leo MD;  Location:  OR      TRANSPLANTATION OF KIDNEY  9/04    recipient -- done at [facility]     COLONOSCOPY       COLONOSCOPY N/A 8/9/2017    Procedure: COMBINED COLONOSCOPY, SINGLE OR MULTIPLE BIOPSY/POLYPECTOMY BY BIOPSY;;  Surgeon: Sushil Hyatt MD;  Location:  GI     COLPOSCOPY,LOOP ELECTRD CERVIX EXCIS   03/11/08    TAL II     CONIZATION LEEP  7/17/2013    Procedure: CONIZATION LEEP;;  Surgeon: Liliana Renteria MD;  [facility]     CONIZATION LEEP N/A 8/17/2016    Procedure: CONIZATION LEEP;  Surgeon: Liliana Renteria MD;  [facility]     CV CORONARY ANGIOGRAM N/A 4/6/2021    Procedure: CV CORONARY ANGIOGRAM;  Surgeon: Sincere Rosas MD;  Location:  HEART CARDIAC CATH LAB     CV PCI STENT DRUG ELUTING N/A 4/6/2021    Procedure: Percutaneous Coronary Intervention Stent Drug Eluting;  Surgeon: Sincere Rosas MD;  Location:  HEART CARDIAC CATH LAB     ENDOVASCULAR REPAIR ANEURYSM AORTOILIAC Bilateral 4/6/2021    Procedure: Endovascular Abdominal Aortic Aneurysm Repair with Aortouniiliac Device and Femoral-Femoral Artery Bypass with [id number] Artegraft;  Surgeon: Abena Ferrara MD;  [facility]     EXAM UNDER ANESTHESIA ANUS  7/15/2014    Procedure: EXAM UNDER ANESTHESIA ANUS;  Surgeon: Radha Musa MD;  [facility]     EXAM UNDER ANESTHESIA ANUS N/A 3/14/2018    Procedure: EXAM UNDER ANESTHESIA ANUS;  Anal Exam Under Anesthesia With Excision of anal lesion, proctoscopy;  Surgeon: Shabbir Leo MD;  [facility]     EYE SURGERY       GENITOURINARY SURGERY       IR OR ANGIOGRAM  4/6/2021     LASER CO2 EXCISE VULVA WIDE LOCAL  7/15/2014    Procedure: LASER CO2 EXCISE VULVA WIDE LOCAL;  Surgeon: Liliana Renteria MD;  [facility]     LASER CO2 VAGINA  7/17/2013    Procedure: LASER CO2 VAGINA;;  Surgeon: Liliana Renteria MD;  [facility]     LASER CO2 VAGINA N/A 9/25/2018    Procedure: LASER CO2 VAGINA;  Exam Under Anesthesia, CO2 Laser Ablation of Upper Vagina and Cervix;  Surgeon: Pati Garcia MD;  [facility]     MICROSCOPY ANAL  7/17/2013    Procedure: MICROSCOPY ANAL;  Anal Microscopy,  EUA vagina,Colposcopy Of Vagina And Vulva, Vaginal Biopsies, Omniguide Co2 Laser To Vagina and vulva, Loop Electrosurgical Excision  Procedure To Cervix;  Surgeon: Radha Musa MD;  [facility]     MICROSCOPY ANAL  7/15/2014    Procedure: MICROSCOPY ANAL;  Surgeon: Radha Musa MD;  [facility]     VASCULAR SURGERY      Thrombectomy     [facility] NONSPECIFIC PROCEDURE      Thrombectomy     Z NONSPECIFIC PROCEDURE  1955 and 1959    Bilater eye surgery - correction for crossed eyes     Z NONSPECIFIC PROCEDURE  1998    oopherectomy L     Z NONSPECIFIC PROCEDURE  1967    open kidney biopsy - L       ALLERGIES:      Allergies   Allergen Reactions     Blood Transfusion Related (Informational Only) Other (See Comments)     Patient has a history of a clinically significant antibody against RBC antigens.  A delay in compatible RBCs may occur.     Ultracet Nausea and Vomiting and Hives     Hydrocodone Nausea and Vomiting and Hives       MEDICATIONS:  No current facility-administered medications on file prior to encounter.   acetaminophen (TYLENOL) 325 MG tablet, Take 2 tablets (650 mg) by mouth every 4 hours as needed for other (For optimal non-opioid multimodal pain management to improve pain control.)  amoxicillin (AMOXIL) 500 MG capsule, Take 4 capsules (2,000 mg) by mouth once as needed Prior to dental procedures  aspirin (ASA) 81 MG chewable tablet, Take 1 tablet (81 mg) by mouth daily  atorvastatin (LIPITOR) 80 MG tablet, Take 1 tablet (80 mg) by mouth daily  calcium carbonate 600 mg-vitamin D 400 units (CALTRATE) 600-400 MG-UNIT per tablet, Take 1 tablet by mouth 2 times daily  Lactobacillus-Inulin ([facility] DIGESTIVE [facility]) CAPS, TAKE ONE CAPSULE BY MOUTH EVERY DAY (DUE FOR PHYSICAL IN MARCH)  lidocaine (LIDODERM) 5 % patch, Place 1 patch onto the skin every 24 hours To prevent lidocaine toxicity, patient should be patch free for 12 hrs daily.    Apply to low back as needed for back pain  lisinopril (ZESTRIL) 5 MG tablet, Take 1 tablet (5 mg) by mouth daily  metoprolol tartrate (LOPRESSOR) 100 MG tablet, Take  1 tablet (100 mg) by mouth 2 times daily  mycophenolic acid (GENERIC EQUIVALENT) 360 MG EC tablet, Take 1 tablet (360 mg) by mouth 2 times daily  oxyCODONE (ROXICODONE) 5 MG tablet, Take 1 tablet (5 mg) by mouth every 6 hours as needed for moderate to severe pain  predniSONE (DELTASONE) 5 MG tablet, Take 1 tablet (5 mg) by mouth daily  ticagrelor (BRILINTA) 90 MG tablet, Take 1 tablet (90 mg) by mouth every 12 hours        PHYSICAL EXAMINATION:  Temp:  [97.6  F (36.4  C)-97.8  F (36.6  C)] 97.8  F (36.6  C)  Pulse:  [56-74] 74  Resp:  [13-36] 22  BP: (162-168)/(87-89) 162/87  MAP:  [83 mmHg-131 mmHg] 120 mmHg  Arterial Line BP: ()/() 160/94  SpO2:  [94 %-99 %] 97 %  General: Alert, well-appearing in no acute distress.  HEENT: Normocephalic, atraumatic  Neck: Supple  Respiratory: Non-labored breathing on 2 LPM, CTAB  Cardiovascular: Regular rate and rhythm on telemetry, extremities well perfused  Gastrointestinal: Abdomen soft, non-distended, non-tender  Extremities: Moving all four extremities. No limb deformities. No pedal edema. Dopplerable pulses both feet.  Skin: As noted above. No rashes or lesions appreciated.    LABS: Reviewed.   Arterial Blood Gases   Recent Labs   Lab 04/25/21 2011 04/25/21 1912 04/24/21  1313   PH 7.30* 7.32* 7.46*   PCO2 43 39 29*   PO2 110* 124* 58*   HCO3 21 20* 21     Complete Blood Count   Recent Labs   Lab 04/25/21 2011 04/25/21 1912 04/25/21  0511 04/24/21 2200 04/23/21 1935 04/23/21 1935   WBC  --   --  12.8*  --   --  13.4*   HGB 9.9* 9.8* 9.4* 9.1*   < > 9.1*   PLT  --   --  428  --   --  529*    < > = values in this interval not displayed.     Basic Metabolic Panel  Recent Labs   Lab 04/25/21 2011 04/25/21  1912 04/25/21  0511 04/24/21  1313 04/23/21  1935    134 137 132* 132*   POTASSIUM 4.7 4.2 4.1 5.3 4.4   CHLORIDE  --   --  109  --  102   CO2  --   --  19*  --  21   BUN  --   --  33*  --  36*   CR  --   --  1.46*  --  1.63*   * 128* 108* 99  112*     Liver Function Tests  Recent Labs   Lab 04/23/21  1935   AST 16   ALT 15   ALKPHOS 169*   BILITOTAL 0.5   ALBUMIN 2.0*     Pancreatic Enzymes  No lab results found in last 7 days.  Coagulation Profile  No lab results found in last 7 days.  Lactate  Invalid input(s): LACTATE    IMAGING:  Results for orders placed or performed during the hospital encounter of 04/23/21   CT Abdomen Pelvis w/o Contrast    Narrative    EXAMINATION: CT ABDOMEN PELVIS W/O CONTRAST, 4/23/2021 9:24 PM    TECHNIQUE:  Helical CT images from the lung bases through the  symphysis pubis were obtained without IV contrast. Contrast dose: None    COMPARISON: Chest abdomen and pelvis CTA 3/22/2021    HISTORY: Abdominal abscess/infection suspected. Status post EVAR and  aorto uniliac graft 4/6/21    FINDINGS:    Abdomen and pelvis: Postprocedural changes of abdominal aorta EVAR  with left uni-iliac graft. The distal limb of the graft is positioned  in the left common femoral artery. The proximal end of the graft is  located above the native renal arteries The abdominal aortic aneurysm  sac measures 6.2 x 5.8 cm on image 185 of series 5. There is mild  stranding surrounding the aneurysm sac consistent with postsurgical  changes. Vascular patency not evaluated on this noncontrast study.     Unremarkable noncontrast appearance of the liver, gallbladder,  pancreas, and spleen. Nondilated common bile duct. Left adrenal  calcification. Bilateral atrophic native kidneys. 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  A small hemorrhagic/proteinaceous cyst in the mid transplant, series 3  image 49 suspected measuring 7 mm.    No small bowel obstruction. Scattered stool throughout the colon.  Appendicoliths within nondilated appendix demonstrate. No adjacent  inflammation.    The bladder and uterus are within normal limits.    Lung bases/lower chest:  There is a pericardial effusion measuring up  to 1.5 cm  in thickness on image 35 of series 5. Aneurysmal lower  thoracic aorta measuring up to 4.6 cm. Atherosclerosis. The lung bases  are relatively clear. Emphysematous changes. No pleural effusion.  There may be a small [place] fat-containing hernia along the right  hemidiaphragm.    Bones and soft tissues: There is an anterior abdominal wall fluid  collection with thick walls loculations measuring 7.9 x 3.4 cm and 6.0  x 2.1 cm on image 327 of series 5, extending from the right groin  surgical site right common femoral artery along the femorofemoral  bypass graft to the left common femoral artery. There is surrounding  inflammatory stranding of the lower anterior abdominal wall and right  groin. No acute or aggressive appearing bone lesion. Body wall edema.      Impression    IMPRESSION:   1. Large thick-walled fluid collection in the lower abdominal wall  extending from the right groin surgical site and right common femoral  artery along the femoral-femoral bypass graft to the left common  femoral artery. Surgical consultation is recommended.    2. Postprocedural changes of abdominal aortic EVAR with left uni-iliac  graft.     I have personally reviewed the examination and initial interpretation  and I agree with the findings.    ELISSA RIVAS MD   US Lower Ext Arterial Duplex Limited Bilat    Narrative    Exam: US LOWER EXT ARTERIAL DUPLEX LIMITED BILAT, 4/24/2021 8:06 AM    Indication: evaluate right groin, left groin, and fem-fem bypass    Comparison: CT 4/23/2021    Findings:   There is a complex fluid collection in the anterior soft tissues in  the lower abdomen measuring approximately 8.5 x 17.4 x 2.7, this  corresponds to the fluid collection noted on the abdominal CT from  4/23/2021. The femoral femoral bypass graft is patent with with  monophasic/biphasic waveforms with a sharp systolic upstroke. The left  external iliac artery above the graft, common femoral artery below the  graft and left proximal  profunda femoral artery are patent with normal  triphasic waveforms. The right superficial femoral artery below the  anastomosis is patent with monophasic waveforms with a sharp systolic  upstroke. The right common femoral artery at the anastomosis is patent  with triphasic waveforms.      Impression    Impression:   1. Patent femoral-femoral bypass graft.  2. Large complex lower abdominal wall postoperative fluid collection  measuring approximately 8.5 x 17.4 x 2.7 cm, this was previously  visualized on CT 2021 and ultrasound 2021 however appears to  have increased in size. Consider surgical consultation.    I have personally reviewed the examination and initial interpretation  and I agree with the findings.    MAGALIE MARS MD   Echocardiogram Limited    Narrative    [id number]  [id number]  [id number]  [id number]^JACOB^DAVID^JAMI     [facility],[place]  Echocardiography Laboratory  [address] [place] [id number]     Name: ERASMO MERINO  MRN: [id number]  : 1950  Study Date: 2021 09:57 AM  Age: 70 yrs  Gender: Female  Patient Location: [facility]  Reason For Study: MI  Ordering Physician: DAVID JAMES  Performed By: Sayda Pina RDCS     BSA: 1.4 m2  Height: 62 in  Weight: 101 lb  BP: 131/82 mmHg  ______________________________________________________________________________  Procedure  Limited Portable Echo Adult. Contrast Optison. Optison (NDC #1156-6893-06)  given intravenously. Patient was given 5 ml mixture of 3 ml Optison and 6 ml  saline. 4 ml wasted.  ______________________________________________________________________________  Interpretation Summary  Moderately (EF 35-40%) reduced left ventricular function is present. There is  thinning and akinesis of the mid septal segment. Inferior wall akinesis is  present. Apical wall akinesis is present.  Trivial pericardial effusion is present.     This study was compared with the study from 2021.  No significant changes  noted.  ______________________________________________________________________________  Left Ventricle  There is thinning and akinesis of the mid septal segment. Inferior wall  akinesis is present. Apical wall akinesis is present. Moderately (EF 35-40%)  reduced left ventricular function is present.     Mitral Valve  The mitral valve is normal. Mild mitral insufficiency is present.     Aortic Valve  Trileaflet aortic sclerosis without stenosis.     Tricuspid Valve  The tricuspid valve is normal. Mild tricuspid insufficiency is present.  Pulmonary artery systolic pressure cannot be assessed.     Vessels  The inferior vena cava was normal in size with preserved respiratory  variability.     Pericardium  Trivial pericardial effusion is present.     Compared to Previous Study  This study was compared with the study from 4/7/2021 . No significant changes  noted.     ______________________________________________________________________________  Report approved by: MD Kash Tomas 04/24/2021 10:32 AM     ______________________________________________________________________________        *Note: Due to a large number of results and/or encounters for the requested time period, some results have not been displayed. A complete set of results can be found in Results Review.       Patient seen, findings and plan discussed with surgical ICU staff.

## 2021-04-26 NOTE — TELEPHONE ENCOUNTER
Reason for Call:  Form, our goal is to have forms completed with 72 hours, however, some forms may require a visit or additional information.    Type of letter, form or note:  Forms: SN, PT, OT, SLP  order date 4/23/2021.    Who is the form from?: Home care    Where did the form come from: form was faxed in    What clinic location was the form placed at?:   [facility]    Where the form was placed: Dr. Martinez's box.    What number is listed as a contact on the form?:   Dr. aMrtinez's box       Additional comments: none    Call taken on 4/26/2021 at 10:49 AM by Gina Montez

## 2021-04-26 NOTE — PROGRESS NOTES
[facility]  Patient is currently open to home care services with [facility]. The patient is currently receiving RN, PT, OT services.  and home health team have been notified of patient admission. [facility] liaison will continue to follow patient during stay. If appropriate provide orders to resume home care at time of discharge.    Adelaide Flynn RN   [facility] Liaison   [phone]

## 2021-04-27 ENCOUNTER — APPOINTMENT (OUTPATIENT)
Dept: PHYSICAL THERAPY | Facility: CLINIC | Age: 71
DRG: 252 | End: 2021-04-27
Payer: COMMERCIAL

## 2021-04-27 ENCOUNTER — ANESTHESIA EVENT (OUTPATIENT)
Dept: SURGERY | Facility: CLINIC | Age: 71
DRG: 252 | End: 2021-04-27
Payer: COMMERCIAL

## 2021-04-27 ENCOUNTER — APPOINTMENT (OUTPATIENT)
Dept: CARDIOLOGY | Facility: CLINIC | Age: 71
DRG: 252 | End: 2021-04-27
Attending: DIETITIAN, REGISTERED
Payer: COMMERCIAL

## 2021-04-27 ENCOUNTER — TELEPHONE (OUTPATIENT)
Dept: FAMILY MEDICINE | Facility: CLINIC | Age: 71
End: 2021-04-27

## 2021-04-27 DIAGNOSIS — Z53.9 DIAGNOSIS NOT YET DEFINED: Primary | ICD-10-CM

## 2021-04-27 LAB
ABO + RH BLD: NORMAL
ABO + RH BLD: NORMAL
ANION GAP SERPL CALCULATED.3IONS-SCNC: 5 MMOL/L (ref 3–14)
BACTERIA SPEC CULT: ABNORMAL
BLD GP AB SCN SERPL QL: NORMAL
BLD PROD TYP BPU: NORMAL
BLOOD BANK CMNT PATIENT-IMP: NORMAL
BUN SERPL-MCNC: 42 MG/DL (ref 7–30)
CALCIUM SERPL-MCNC: 8.6 MG/DL (ref 8.5–10.1)
CHLORIDE SERPL-SCNC: 110 MMOL/L (ref 94–109)
CO2 SERPL-SCNC: 23 MMOL/L (ref 20–32)
COPATH REPORT: NORMAL
CREAT SERPL-MCNC: 1.68 MG/DL (ref 0.52–1.04)
ERYTHROCYTE [DISTWIDTH] IN BLOOD BY AUTOMATED COUNT: 18.4 % (ref 10–15)
GFR SERPL CREATININE-BSD FRML MDRD: 31 ML/MIN/{1.73_M2}
GLUCOSE SERPL-MCNC: 110 MG/DL (ref 70–99)
HCT VFR BLD AUTO: 25.3 % (ref 35–47)
HGB BLD-MCNC: 8.1 G/DL (ref 11.7–15.7)
Lab: ABNORMAL
MAGNESIUM SERPL-MCNC: 2.8 MG/DL (ref 1.6–2.3)
MAGNESIUM SERPL-MCNC: 3.2 MG/DL (ref 1.6–2.3)
MCH RBC QN AUTO: 27.8 PG (ref 26.5–33)
MCHC RBC AUTO-ENTMCNC: 32 G/DL (ref 31.5–36.5)
MCV RBC AUTO: 87 FL (ref 78–100)
NUM BPU REQUESTED: 3
PHOSPHATE SERPL-MCNC: 3.4 MG/DL (ref 2.5–4.5)
PLATELET # BLD AUTO: 324 10E9/L (ref 150–450)
POTASSIUM SERPL-SCNC: 3.8 MMOL/L (ref 3.4–5.3)
RBC # BLD AUTO: 2.91 10E12/L (ref 3.8–5.2)
SODIUM SERPL-SCNC: 138 MMOL/L (ref 133–144)
SPECIMEN EXP DATE BLD: NORMAL
SPECIMEN SOURCE: ABNORMAL
SPECIMEN SOURCE: ABNORMAL
TROPONIN I SERPL-MCNC: 10.13 UG/L (ref 0–0.04)
TROPONIN I SERPL-MCNC: 12.47 UG/L (ref 0–0.04)
WBC # BLD AUTO: 14.1 10E9/L (ref 4–11)

## 2021-04-27 PROCEDURE — 99233 SBSQ HOSP IP/OBS HIGH 50: CPT | Performed by: ANESTHESIOLOGY

## 2021-04-27 PROCEDURE — 93325 DOPPLER ECHO COLOR FLOW MAPG: CPT | Mod: 74

## 2021-04-27 PROCEDURE — 83735 ASSAY OF MAGNESIUM: CPT | Performed by: SURGERY

## 2021-04-27 PROCEDURE — 87040 BLOOD CULTURE FOR BACTERIA: CPT | Performed by: DIETITIAN, REGISTERED

## 2021-04-27 PROCEDURE — 250N000013 HC RX MED GY IP 250 OP 250 PS 637: Performed by: STUDENT IN AN ORGANIZED HEALTH CARE EDUCATION/TRAINING PROGRAM

## 2021-04-27 PROCEDURE — 97530 THERAPEUTIC ACTIVITIES: CPT | Mod: GP

## 2021-04-27 PROCEDURE — 99233 SBSQ HOSP IP/OBS HIGH 50: CPT | Mod: GC | Performed by: INTERNAL MEDICINE

## 2021-04-27 PROCEDURE — G0180 MD CERTIFICATION HHA PATIENT: HCPCS | Performed by: FAMILY MEDICINE

## 2021-04-27 PROCEDURE — 999N000015 HC STATISTIC ARTERIAL MONITORING DAILY

## 2021-04-27 PROCEDURE — 83735 ASSAY OF MAGNESIUM: CPT | Performed by: STUDENT IN AN ORGANIZED HEALTH CARE EDUCATION/TRAINING PROGRAM

## 2021-04-27 PROCEDURE — 36415 COLL VENOUS BLD VENIPUNCTURE: CPT | Performed by: PHYSICIAN ASSISTANT

## 2021-04-27 PROCEDURE — 250N000013 HC RX MED GY IP 250 OP 250 PS 637: Performed by: SURGERY

## 2021-04-27 PROCEDURE — 250N000011 HC RX IP 250 OP 636: Performed by: INTERNAL MEDICINE

## 2021-04-27 PROCEDURE — 80048 BASIC METABOLIC PNL TOTAL CA: CPT | Performed by: STUDENT IN AN ORGANIZED HEALTH CARE EDUCATION/TRAINING PROGRAM

## 2021-04-27 PROCEDURE — 36415 COLL VENOUS BLD VENIPUNCTURE: CPT | Performed by: DIETITIAN, REGISTERED

## 2021-04-27 PROCEDURE — 250N000011 HC RX IP 250 OP 636: Performed by: NURSE PRACTITIONER

## 2021-04-27 PROCEDURE — 84484 ASSAY OF TROPONIN QUANT: CPT | Performed by: STUDENT IN AN ORGANIZED HEALTH CARE EDUCATION/TRAINING PROGRAM

## 2021-04-27 PROCEDURE — 97162 PT EVAL MOD COMPLEX 30 MIN: CPT | Mod: GP

## 2021-04-27 PROCEDURE — 84100 ASSAY OF PHOSPHORUS: CPT | Performed by: STUDENT IN AN ORGANIZED HEALTH CARE EDUCATION/TRAINING PROGRAM

## 2021-04-27 PROCEDURE — 250N000012 HC RX MED GY IP 250 OP 636 PS 637: Performed by: SURGERY

## 2021-04-27 PROCEDURE — 250N000011 HC RX IP 250 OP 636: Performed by: SURGERY

## 2021-04-27 PROCEDURE — 85027 COMPLETE CBC AUTOMATED: CPT | Performed by: STUDENT IN AN ORGANIZED HEALTH CARE EDUCATION/TRAINING PROGRAM

## 2021-04-27 PROCEDURE — 84484 ASSAY OF TROPONIN QUANT: CPT | Performed by: SURGERY

## 2021-04-27 PROCEDURE — 99233 SBSQ HOSP IP/OBS HIGH 50: CPT | Performed by: STUDENT IN AN ORGANIZED HEALTH CARE EDUCATION/TRAINING PROGRAM

## 2021-04-27 PROCEDURE — 99232 SBSQ HOSP IP/OBS MODERATE 35: CPT | Mod: GC | Performed by: INTERNAL MEDICINE

## 2021-04-27 PROCEDURE — 258N000003 HC RX IP 258 OP 636: Performed by: SURGERY

## 2021-04-27 PROCEDURE — 120N000003 HC R&B IMCU UMMC

## 2021-04-27 PROCEDURE — 250N000013 HC RX MED GY IP 250 OP 250 PS 637: Performed by: ANESTHESIOLOGY

## 2021-04-27 PROCEDURE — 999N000157 HC STATISTIC RCP TIME EA 10 MIN

## 2021-04-27 PROCEDURE — 99024 POSTOP FOLLOW-UP VISIT: CPT | Performed by: NURSE PRACTITIONER

## 2021-04-27 PROCEDURE — 87040 BLOOD CULTURE FOR BACTERIA: CPT | Performed by: PHYSICIAN ASSISTANT

## 2021-04-27 RX ORDER — FENTANYL CITRATE 50 UG/ML
25 INJECTION, SOLUTION INTRAMUSCULAR; INTRAVENOUS ONCE
Status: COMPLETED | OUTPATIENT
Start: 2021-04-27 | End: 2021-04-27

## 2021-04-27 RX ORDER — CEFAZOLIN SODIUM 2 G/100ML
2 INJECTION, SOLUTION INTRAVENOUS EVERY 12 HOURS
Status: DISCONTINUED | OUTPATIENT
Start: 2021-04-27 | End: 2021-05-02 | Stop reason: HOSPADM

## 2021-04-27 RX ORDER — AMLODIPINE BESYLATE 5 MG/1
5 TABLET ORAL DAILY
Status: DISCONTINUED | OUTPATIENT
Start: 2021-04-27 | End: 2021-04-27

## 2021-04-27 RX ORDER — FENTANYL CITRATE 50 UG/ML
75 INJECTION, SOLUTION INTRAMUSCULAR; INTRAVENOUS ONCE
Status: DISCONTINUED | OUTPATIENT
Start: 2021-04-27 | End: 2021-05-01

## 2021-04-27 RX ORDER — ISOSORBIDE MONONITRATE 30 MG/1
60 TABLET, EXTENDED RELEASE ORAL DAILY
Status: DISCONTINUED | OUTPATIENT
Start: 2021-04-28 | End: 2021-05-02 | Stop reason: HOSPADM

## 2021-04-27 RX ADMIN — MIDAZOLAM 2 MG: 1 INJECTION INTRAMUSCULAR; INTRAVENOUS at 12:31

## 2021-04-27 RX ADMIN — MELATONIN TAB 3 MG 6 MG: 3 TAB at 00:12

## 2021-04-27 RX ADMIN — ISOSORBIDE MONONITRATE 30 MG: 30 TABLET, EXTENDED RELEASE ORAL at 08:22

## 2021-04-27 RX ADMIN — ASPIRIN 81 MG CHEWABLE TABLET 81 MG: 81 TABLET CHEWABLE at 08:22

## 2021-04-27 RX ADMIN — FENTANYL CITRATE 25 MCG: 50 INJECTION, SOLUTION INTRAMUSCULAR; INTRAVENOUS at 11:07

## 2021-04-27 RX ADMIN — SODIUM CHLORIDE: 9 INJECTION, SOLUTION INTRAVENOUS at 21:26

## 2021-04-27 RX ADMIN — TICAGRELOR 90 MG: 90 TABLET ORAL at 20:53

## 2021-04-27 RX ADMIN — PIPERACILLIN AND TAZOBACTAM 2.25 G: 2; .25 INJECTION, POWDER, FOR SOLUTION INTRAVENOUS at 08:01

## 2021-04-27 RX ADMIN — PIPERACILLIN AND TAZOBACTAM 2.25 G: 2; .25 INJECTION, POWDER, FOR SOLUTION INTRAVENOUS at 14:12

## 2021-04-27 RX ADMIN — MIDAZOLAM 0.5 MG: 1 INJECTION INTRAMUSCULAR; INTRAVENOUS at 12:32

## 2021-04-27 RX ADMIN — PREDNISONE 5 MG: 5 TABLET ORAL at 08:22

## 2021-04-27 RX ADMIN — MYCOPHENOLIC ACID 360 MG: 360 TABLET, DELAYED RELEASE ORAL at 20:53

## 2021-04-27 RX ADMIN — TICAGRELOR 90 MG: 90 TABLET ORAL at 08:22

## 2021-04-27 RX ADMIN — CEFAZOLIN SODIUM 2 G: 2 INJECTION, SOLUTION INTRAVENOUS at 20:53

## 2021-04-27 RX ADMIN — MYCOPHENOLIC ACID 360 MG: 360 TABLET, DELAYED RELEASE ORAL at 08:22

## 2021-04-27 RX ADMIN — MELATONIN TAB 3 MG 6 MG: 3 TAB at 22:22

## 2021-04-27 RX ADMIN — PIPERACILLIN AND TAZOBACTAM 2.25 G: 2; .25 INJECTION, POWDER, FOR SOLUTION INTRAVENOUS at 01:50

## 2021-04-27 RX ADMIN — ATORVASTATIN CALCIUM 80 MG: 40 TABLET, FILM COATED ORAL at 08:22

## 2021-04-27 RX ADMIN — ACETAMINOPHEN 650 MG: 325 TABLET, FILM COATED ORAL at 08:22

## 2021-04-27 RX ADMIN — FLUTICASONE PROPIONATE 2 SPRAY: 50 SPRAY, METERED NASAL at 08:23

## 2021-04-27 ASSESSMENT — ACTIVITIES OF DAILY LIVING (ADL)
ADLS_ACUITY_SCORE: 18

## 2021-04-27 NOTE — PROGRESS NOTES
04/27/21 1300   Quick Adds   Type of Visit Initial PT Evaluation   Living Environment   People in home spouse   Current Living Arrangements house   Home Accessibility stairs to enter home;stairs within home   Number of Stairs, Main Entrance 2   Stair Railings, Main Entrance none   Number of Stairs, Within Home, Primary   (flight to bed/bath)   Stair Railings, Within Home, Primary railing on left side (ascending)   Transportation Anticipated car, drives self;family or friend will provide   Living Environment Comments Pt lives with supportive spouse that can provide some assist PRN   Self-Care   Usual Activity Tolerance good   Current Activity Tolerance fair   Regular Exercise No   Equipment Currently Used at Home none   Activity/Exercise/Self-Care Comment Enjoys crocheting. Has children/grandchildren. Used to work as an    Disability/Function   Fall history within last six months no   Change in Functional Status Since Onset of Current Illness/Injury yes   General Information   Onset of Illness/Injury or Date of Surgery 04/23/21   Patient/Family Therapy Goals Statement (PT) Ok to sit up but doesn't want to get OOB.   Pertinent History of Current Problem (include personal factors and/or comorbidities that impact the POC) 69 yo F s/p EVAR AUI and left to right femoral to femoral bypass graft for 5.9 cm AAA. Initial post-op course was complicated by inferior STEMI requiring emergent cardiac catheterization and PCI to RCA. Patient presenting on 4/23/2021 to ED with subjective fevers and chills and erythema and drainage from right groin wound. Trops trending down. Having ROLY this date. Wound vacs to B groin.   Existing Precautions/Restrictions fall   Cognition   Orientation Status (Cognition) oriented x 3   Affect/Mental Status (Cognition) low arousal/lethargic;WFL   Follows Commands (Cognition) follows one-step commands   Pain Assessment   Patient Currently in Pain Yes, see Vital Sign flowsheet    Integumentary/Edema   Integumentary/Edema Comments B wound vac to groin wounds.   Posture    Posture Forward head position;Protracted shoulders;Kyphosis   Range of Motion (ROM)   ROM Comment B LE AROM mod limited due to weakness/pain/wound vacs   Strength   Strength Comments B LE strength grossly 2-3/5 as per MRC sum score   Bed Mobility   Comment (Bed Mobility) Supine>sit with cues and max A for LE and trunk support.   Transfers   Transfer Safety Comments Not assessed this date; pt unwilling   Gait/Stairs (Locomotion)   Comment (Gait/Stairs) Not assessed   Balance   Balance Comments Sitting balance: min-mod A for trunk support.   Sensory Examination   Sensory Perception patient reports no sensory changes   Clinical Impression   Criteria for Skilled Therapeutic Intervention yes, treatment indicated   PT Diagnosis (PT) Impaired functional mobility   Influenced by the following impairments B groin wounds, pain, weakness, poor posture   Functional limitations due to impairments Bed mobility, trasnfers, gait, balance, endurance   Clinical Presentation Stable/Uncomplicated   Clinical Presentation Rationale Clinical judgement   Clinical Decision Making (Complexity) low complexity   Therapy Frequency (PT) 6x/week   Predicted Duration of Therapy Intervention (days/wks) 3 weeks   Planned Therapy Interventions (PT) balance training;bed mobility training;gait training;home exercise program;neuromuscular re-education;patient/family education;stair training;strengthening;ROM (range of motion);stretching;transfer training   Risk & Benefits of therapy have been explained evaluation/treatment results reviewed;participants included;patient;care plan/treatment goals reviewed;risks/benefits reviewed;current/potential barriers reviewed;participants voiced agreement with care plan   PT Discharge Planning    PT Discharge Recommendation (DC Rec) Transitional Care Facility;Acute Rehab Center-Motivated patient will benefit from intensive,  interdisciplinary therapy.  Anticipate will be able to tolerate 3 hours of therapy per day   PT Rationale for DC Rec TBD due to cognitive status/low arousal, may have motivation/functional impairments to warrant [facility] stay   PT Brief overview of current status  Lift bed<>chair; EOB with A x 1; has not stood yet   Total Evaluation Time   Total Evaluation Time (Minutes) 10

## 2021-04-27 NOTE — PROGRESS NOTES
VASCULAR SURGERY BRIEF FOLLOW-UP NOTE:    Vac transitioned to a regular wound vac therapy per request of Dr. Ferrara.  Coordinated Veraflo vac removal to be done during ROLY under conscious sedation and patient tolerated the procedure well.  Wound beds appear clean and pink with surrounding ecchymosis.                Jessica Bunn DNP,APRN, AGNP-C  Division of Vascular Surgery   [facility]  Pager: [phone]

## 2021-04-27 NOTE — PROGRESS NOTES
Pt transferred from 4E to 6B at 1350.  Afebrile, VSS.  Pt is alert & oriented x 4. Neuro's intact.   Low fat/ Low Sodium diet= Fair po. No complaints of nausea.   Tylenol given x 1 for pain. Bilateral groin sites with wound vac patent.   No complaints of numbness or tingling. Doppler needed for right foot pedal pulses.   Pt incontinent of liquid stool x 4. Villavicencio removed at 1100. Bladder scanned for 225cc urine at 1745.  2 person skin assessment completed. Pt has numerous bruises and abrasions.   Blanchable erythema on coccyx.   Pt unable to tolerate ROLY today.   It is scheduled for tomorrow at 10:30 with anesthesia.

## 2021-04-27 NOTE — PROGRESS NOTES
Transferred to: Unit 6B at (time)  Belongings: cell phone, , book, personal care items. Per patient missing a bed robe and winter coat from when she was on 6D.   Villavicencio removed? Yes  Central line removed? Yes  Chart and medications sent with patient Yes  Family notified: Yes, daughter Aminata aware of room change.

## 2021-04-27 NOTE — PROGRESS NOTES
"VASCULAR SURGERY PROGRESS NOTE    Subjective:  Issues with maintaining VAC suctioning overnight, nursing needed to reinforce.  Patient denies pain when at rest, feels weak.  Trops trending down.  Plan for ROLY today, echo yesterday showed EF of 35-40%    Objective:    Intake/Output Summary (Last 24 hours) at 4/27/2021 0744  Last data filed at 4/27/2021 0600  Gross per 24 hour   Intake 1500.75 ml   Output 1110 ml   Net 390.75 ml     Labs:  ROUTINE IP LABS (Last four results)  BMP  Recent Labs   Lab 04/27/21  0204 04/26/21  1328 04/26/21  0459 04/26/21  0157    138 140 136   POTASSIUM 3.8 4.1 4.4 4.5   CHLORIDE 110* 111* 112* 108   KAYKAY 8.6 8.4* 8.4* 8.8   CO2 23 18* 18* 20   BUN 42* 41* 38* 38*   CR 1.68* 1.58* 1.51* 1.53*   * 146* 138* 136*     CBC  Recent Labs   Lab 04/27/21  0204 04/26/21  1328 04/26/21  0459 04/26/21  0157   WBC 14.1* 15.1* 13.4* 14.7*   RBC 2.91* 3.07* 2.82* 2.91*   HGB 8.1* 8.5* 8.2* 8.3*   HCT 25.3* 26.5* 25.3* 26.2*   MCV 87 86 90 90   MCH 27.8 27.7 29.1 28.5   MCHC 32.0 32.1 32.4 31.7   RDW 18.4* 17.6* 16.1* 15.9*    391 434 454*     INR  Recent Labs   Lab 04/25/21  2200   INR 1.18*     PHYSICAL EXAM:  BP (!) 146/86   Pulse 84   Temp 98  F (36.7  C) (Oral)   Resp 14   Ht 1.575 m (5' 2\")   Wt 47.8 kg (105 lb 6.1 oz)   SpO2 98%   BMI 19.27 kg/m    General: The patient is alert and oriented. Appropriate. No acute distress  Psych: pleasant affect, answers questions appropriately  Skin: Pale, hair loss due to chemo therapy.  Respiratory: The patient does require supplemental oxygen nasal cannula. Breathing unlabored  GI:  Abdomen soft, nontender to light palpation.  Extremities: Bilateral groin wound vacs, reinforced and additional vac pad added to the right side to maintain suction, surrounding skin intact without redness or swelling.  DP/PT signals present on doppler bilaterally.          ASSESSMENT:  71 yo F s/p EVAR AUI and left to right femoral to femoral bypass " graft for 5.9 cm AAA. Initial post-op course was complicated by inferior STEMI requiring emergent cardiac catheterization and PCI to RCA. Patient presenting on 4/23/2021 to ED with subjective fevers and chills and erythema and drainage from right groin wound.      4/24/2021 - Incision and drainage of right groin wound; packing of the wound with Adaptic, moistened Kerlix gauze, ABD pad     4/25/2021 - Explantation of femoral to femoral bypass graft; femoral to femoral bypass graft using cadaveric homograft (tunneled retropubic); bilateral sartorius muscle flaps; placement of negative pressure wound vac therapy.      4/26/2021 - Cardiac catheterization for post-op EKG changes; RCA stent patent; diffuse vasospasm of LAD, LCx.  Returned to OR for bilateral groin washout and placement of Veraflo wound vac system.    4/27/2021: transfer out of ICU.         PLAN:  -Transfer out of ICU today  -PT/OT  -Diet and optimize nutrition once ROLY completed.  -Continue Veraflo wound vac to bilateral groins, will be changed by vascular surgery towards the end of the week  -Asprin, Brillinta, Atorvastatin, Metoprolol, Lisinopril.  Will discuss timing of resuming DOAC with Dr. Ferrara and discontinue ASA when transitioned.  -Appreciate cardiology recs, nitroglycerine gtt weaned and Imdur 6mg started  -Appreciate ID recs, will continue Vanc and Zosyn and hold off on PICC until BC negative for 48-72 hours.  Anticipating 6 weeks of cefazolin at discharge.    Plan of care discussed with Dr. Ferrara, Vascular attending physician.      Jessica Bunn, DNP, APRN, AGNP-C  Division of Vascular Surgery   [facility]  Pager: [phone]

## 2021-04-27 NOTE — PROGRESS NOTES
[facility]   Cardiology Consults  Progress Note       ASSESSMENT/PLAN:  ASSESSMENT:   Maribel Yang is a 70 year old female who presents with PMHx of inferior STEMI s/p RCA stent on 4/07/2021, DVT, AAA prior fem-fem bypass s/p EVAR on 4/06/2021 who presented with  right femoral erythema that was later found to be secondary to surgical site infection and perigraft fluid collection.     She underwent groin washout, sartorius muscle flap and redo fem fem bypass with cadaveric artery on 4/25. Morning Brillinta dose held against recommendations of cardiology consult service. Post operatively was found to have inferior ST elevations and taken to cath lab, found to have diffuse coronary vasospasm with complete occlusion of RCA that reopened with nitroglycerin. She was reloaded with a total of 180 brillinta between PACU and cath lab.    Regarding prior STEMI: After EVAR on 4/6 developed significant chest comfort, EKG was noted to reveal inferior STEMI that necessitated LIUDMILA to the proximal RCA with plans for DAPT for one year. Patient was then discharged with plans to follow up with Cardiology.    #Coronary vasospasm  #Inferior STEMI s/p LIUDMILA to RCA  #HFrEF with an EF 35-40%  She underwent groin washout, sartorius muscle flap and redo fem fem bypass with cadaveric artery on 4/25. Morning Brillinta dose held against recommendations of cardiology consult service. Post operatively was found to have inferior ST elevations and taken to cath lab, found to have diffuse coronary vasospasm with complete occlusion of RCA that reopened with nitroglycerin. She was reloaded with a total of 180 brillinta between PACU and cath lab. She is chest pain/pressure free post cath on a nitroglycerin ggt. Troponin tending up. Vasospasm likely occurred due to stress of surgery as well as any pressors that she required in the OR. EKG this AM shows no ST elevations, there are TWI in inferior leads. TTE 4/26showed  EF 35-40% with unchanged inferior WMAs, troponin peaked at 29.5.    # S. Aureus bacteremia  Blood cultures positive on 4/23 for S. Aureus. This appears to have similar sensitivities to the S. Aureus that grew out of her wound cultures. She is currently on vancomycin and zosyn.  - recommend ID consult  - ROLY today    RECOMMEND:  - Recommend lasix 40 mg IV x1  - Recommend increase imdur to 60 mg daily to prevent further vasospasm as BP remains elevated  - Ok to restart lisinopril 5mg daily from cardiac standpoint  - avoid non DHP CCBs (verapamil, diltiazem) given HFrEF. Can start amlodipine 5mg daily in the future if necessary to prevent further vasospasm    - Continue PO ASA 81mg, continue PO Brilinta 90mg BID  - Hematology has recommended lifelong DOAC for recurrent DVT. When safe from a surgical standpoint can discontinue aspirin and restart eliquis and continue ticagrelor 90mg BID.  - Continue PO Lipitor 80mg qday   - hold BB for now  - ACE inhibitor/ARB, and aldosterone blockade for CAD/STEMI ischemic cardiomyopathy (can be added outpatient if blood pressures do not tolerate)    Interval History:  No acute events overnight. Denies chest pain, SOB, lightheadedness and dizziness. She denied PND, orthopnea, nausea, vomitting, diarrhea.     Physical Exam:  Temp:  [97.5  F (36.4  C)-99  F (37.2  C)] 98  F (36.7  C)  Pulse:  [60-91] 84  Resp:  [14-20] 14  BP: (135-146)/(85-96) 146/86  MAP:  [88 mmHg-116 mmHg] 99 mmHg  Arterial Line BP: ()/(61-99) 142/70  SpO2:  [92 %-100 %] 98 %    I/O:   Intake/Output Summary (Last 24 hours) at 4/26/2021 0852  Last data filed at 4/26/2021 0700  Gross per 24 hour   Intake 2345.75 ml   Output 1719 ml   Net 626.75 ml       Wt:   Wt Readings from Last 5 Encounters:   04/26/21 47.8 kg (105 lb 6.1 oz)   04/10/21 48 kg (105 lb 13.1 oz)   04/01/21 42.6 kg (94 lb)   03/16/21 43.3 kg (95 lb 6.4 oz)   09/08/20 42.4 kg (93 lb 8 oz)       GEN: NAD  Pulm: CTAB, no wheezes or rales  Cardiac:  JVP 8, no murmurs  Vascular: no lower extremity edema and dopplerable pulses bilaterally  GI: soft, non distended  Neuro: CN II-XII grossly intact    Medications:    aspirin  81 mg Oral Daily     atorvastatin  80 mg Oral Daily     fluticasone  2 spray Both Nostrils Daily     isosorbide mononitrate  30 mg Oral Daily     [Held by provider] lisinopril  5 mg Oral Daily     melatonin  6 mg Oral At Bedtime     mycophenolic acid  360 mg Oral BID     piperacillin-tazobactam  2.25 g Intravenous Q6H     polyethylene glycol  17 g Oral Daily     predniSONE  5 mg Oral Daily     sodium chloride (PF)  10 mL Intravenous Once     sodium chloride (PF)  3 mL Intracatheter Q8H     ticagrelor  90 mg Oral BID     vancomycin (VANCOCIN) IV  750 mg Intravenous Q24H       sodium chloride 5 mL/hr at 04/27/21 0600       Labs:   CMP  Recent Labs   Lab 04/27/21  0204 04/26/21  1328 04/26/21  0459 04/26/21  0157 04/25/21  2125 04/23/21  1935 04/23/21  1935    138 140 136 139   < > 132*   POTASSIUM 3.8 4.1 4.4 4.5 4.2   < > 4.4   CHLORIDE 110* 111* 112* 108 111*   < > 102   CO2 23 18* 18* 20 19*   < > 21   ANIONGAP 5 10 11 8 9   < > 9   * 146* 138* 136* 143*   < > 112*   BUN 42* 41* 38* 38* 33*   < > 36*   CR 1.68* 1.58* 1.51* 1.53* 1.35*   < > 1.63*   GFRESTIMATED 31* 33* 35* 35* 40*   < > 32*   GFRESTBLACK 36* 38* 41* 40* 47*   < > 37*   KAYKAY 8.6 8.4* 8.4* 8.8 8.3*   < > 9.2   MAG 3.2* 1.9 2.0  --  2.0  --   --    PHOS 3.4 4.5 4.6*  --  4.1  --   --    PROTTOTAL  --   --   --   --   --   --  6.1*   ALBUMIN  --   --   --   --   --   --  2.0*   BILITOTAL  --   --   --   --   --   --  0.5   ALKPHOS  --   --   --   --   --   --  169*   AST  --   --   --   --   --   --  16   ALT  --   --   --   --   --   --  15    < > = values in this interval not displayed.     CBC  Recent Labs   Lab 04/27/21  0204 04/26/21  1328 04/26/21  0459 04/26/21  0157   WBC 14.1* 15.1* 13.4* 14.7*   RBC 2.91* 3.07* 2.82* 2.91*   HGB 8.1* 8.5* 8.2* 8.3*   HCT  25.3* 26.5* 25.3* 26.2*   MCV 87 86 90 90   MCH 27.8 27.7 29.1 28.5   MCHC 32.0 32.1 32.4 31.7   RDW 18.4* 17.6* 16.1* 15.9*    391 434 454*     INR  Recent Labs   Lab 21  2200   INR 1.18*     Arterial Blood Gas  Recent Labs   Lab 21  19121  1313   PH 7.30* 7.32* 7.46*   PCO2 43 39 29*   PO2 110* 124* 58*   HCO3 21 20* 21   O2PER 0.44 43.0 30.0       Diagnostics:    [id number]      Echo:  2021  Moderately (EF 35-40%) reduced left ventricular function is present.  Hypokinesis of the inferior, basal anterior, basal inferoseptal and apical  walls with mid/basal septal aneurysm.  Right ventricular function, chamber size, wall motion, and thickness are  normal.  Small pericardial effusion, with a maximum diameter of 1.3 cm, adjacent to the  right atrium, with no hemodynamic significance.  A left pleural effusion is present.    2021  Moderately (EF 35-40%) reduced left ventricular function is present. There is  thinning and akinesis of the mid septal segment. Inferior wall akinesis is  present. Apical wall akinesis is present.  Trivial pericardial effusion is present.     This study was compared with the study from 2021. No significant changes  noted.    Coronary angiogram:  2021    Conclusions  -Single vessel CAD with patent RCA stent and severe diffuse RCA vasospasm and mild LCA/LAD/LCx vasospasm. All resolved with administration of IC nitroglycerin.     Recommendations  -Additional 90 mg of PO ticagrelor given in CCL  -Can stop heparin  -Continuous IV nitroglycerin  -Stop BB  -Consider adding calcium channel blocker for coronary spasm cautiously given negative ionotropic effect especially with nondihydropyridine CCBs  -Dual antiplatelet treatment with low-dose aspirin daily and 90 mg ticagrelor twice daily as scheduled for recently placed LIUDMILA and recent STEMI.  -Low-dose aspirin po daily lifelong.  -High intensity statin for CAD   -ACE inhibitor/ARB, and  aldosterone blockade for CAD/STEMI ischemic cardiomyopathy   -Continued medical management including lifestyle modifications for CV risk factor optimizations

## 2021-04-27 NOTE — PROGRESS NOTES
[facility]ID [facility]GAN TRANSPLANT INFECTIOUS DISEASES PROGRESS NOTE     Patient:  Maribel Yang   YOB: 1950  Date of Visit:  04/27/2021  Date of Admission: 4/23/2021  Consult Requester:Abena Ferrara MD          Assessment and Recommendations:   Recommendations:  - If blood cultures from 4/26 or 4/27 turn positive, repeat 2 more sets of blood cultures  - Stop Zosyn and Vancomycin  - Start cefazolin 2g IV q12h for MSSA treatment. Anticipate minimum of 6 weeks from graft removal.  - Check ROLY given endovascular infection with Staph aureus (planning to complete in OR tomorrow)  - Hold off on PICC placement until BC are negative at least 48-72 hours    Thank you for the consult. Transplant ID will continue to follow with you.     Delia Villalobos PA-C   Pronouns: she/her/hers  Infectious Diseases  Pager: [phone]  04/27/2021    Assessment:  Maribel Yang is a 70 year old female with PMH significant for ESRD s/p LDKT 9/20/2004 (previously on sirolimus transitioned 4/2/21 to MMF keri-operatively; prednisone), lung cancer s/p SBRT (2014), anal canal carcinoma s/p chemoradiation (6/2018), and 5.9cm AAA and occluded right external iliac artery s/p EVAR with femorofemoral bypass (4/6/21) c/b inferior STEMI s/p LIUDMILA to proximal RCA who was admitted on 4/23 due to endovascular infection at site of previous surgery.      She is now s/p right groin I&D on 4/24 as well as explantation of the femorofemoral bypass graft, femoral to femoral bypass graft using cadaveric homograft, bilateral sartorius muscle flap and wound vac placement on 4/25. BC, I&D culture 4/24, and cultures from 4/25 aregrowing Staph aureus. Sensitivities show MSSA. At this point we can transition antibiotics to MSSA directed therapy. Due to endovascular infection, we anticipate a prolonged course of antibiotics of 6 weeks. Discussed with Vascular Surgery, all artificial graft material was removed from fem-fem site, but she still retains  aortic graft. Presence of artificial material means she may need suppressive antibiotics following completion of IV therapy. This can be determined during follow-up.     As she had positive BC from 4/23, hold off on PICC placement for now as we need to ensure blood has cleared prior to placing line. Given that organism grown on culture is Staph aureus, we would like to check ROLY to ensure no evidence of endocarditis. She unfortunately could not tolerate today, so planning to repeat in OR tomorrow with sedation.        Previous ID Issues:  - none known     Other ID issues:  - QTc interval:  477 msec on 4/23/21   - Pneumocystis prophylaxis:  none  - Viral serostatus: unknown  - Viral prophylaxis:  none  - Fungal prophylaxis:  none  - Immunosuppression: mycophenolic acid, prednisone  - Immunization status:  completed Moderna COVID-19 vaccine 2/23/21  - Gamma globulin status:  1,100 on 3/1/21  - Isolation status: standard precautions         Interval History:   Afebrile. Stable O2 requirement of 4L. Multiple cultures with MSSA. Transferred out of ICU today. Could not tolerate ROLY, so planning to try again tomorrow in OR. Maribel otherwise reports she is doing well. No chest pain, SOB. No abdominal pain or peripheral edema.      Antimicrobials:  Current  - Pip-tazo 4/24-  - Vancomycin 4/23-     Past  - Ceftriaxone 4/23           History of Present Illness:   Adopted from initial consult note:    Transplants:  9/20/2004 (Kidney), Postoperative day:  6063     Maribel Yang is a 70 year old female with PMH significant for ESRD s/p LDKT 9/20/2004 (previously on sirolimus transitioned 4/2/21 to MMF keri-operatively; prednisone), lung cancer s/p SBRT (2014), anal canal carcinoma s/p chemoradiation (6/2018), and 5.9cm AAA and occluded right external iliac artery s/p EVAR with femorofemoral bypass (4/6/21) c/b inferior STEMI s/p LIUDMILA to proximal RCA who presented to the ED on 4/23 due to pain and swelling in right groin at  site of prior procedure and fever.     She previously underwent endovascular abdominal aortic aneurysm repair with aorto uniiliac endovascular repair and femorofemoral bypass as well as plugging of the right common iliac artery to prevent endoleak on 4/6/2021. Post-op course was complicated by inferior STEMI for which she was taken emergently to the cath lab for PCI with LIUDMILA of the proximal RCA. She was able to be discharged to home on 4/10. She was initially doing well at home, although did experience some low back pain. During virtual follow-up visit with vascular surgery on 4/19 it was reported that her incision was healing well and without redness, swelling or drainage. However, the following day she called regarding increased redness, pain, and warmth in the right groin near incision. She was prescribed Keflex on 4/22, however, symptoms worsened and she developed fever up to 103.6 at home which prompted her to report to ED.     In the ED she was noted to be afebrile with mild leukocytosis of 13.4. BC were taken and on Day 2 are growing MSSA in 1 of 2 cultures. She underwent right groin I&D on 4/24 and purulent fluid was seen surrounding right groin Artegraft and bypass graft. Culture was taken which is now growing Staph aureus. She then underwent explantation of the femorofemoral bypass graft, femoral to femoral bypass graft using cadaveric homograft, bilateral sartorius muscle flap and wound vac placement on 4/25. Cultures were collected and are currently pending. Following procedure she developed diffuse coronary vasospasm with complete occlusion of RCA that was reopened with nitroglycerin. She was transferred to SICU. She returned to OR 4/26 with no significant expressible purulence seen. She underwent debridement of some of the muscle of the left sartorius muscle flap with wound vac reapplied.            Review of Systems:   Targeted 5 point review of systems was negative except for noted as above the  interval history section.          Current Medications:      aspirin  81 mg Oral Daily    atorvastatin  80 mg Oral Daily    fentaNYL  25 mcg Intravenous Once    fluticasone  2 spray Both Nostrils Daily    isosorbide mononitrate  30 mg Oral Daily    [Held by provider] lisinopril  5 mg Oral Daily    melatonin  6 mg Oral At Bedtime    mycophenolic acid  360 mg Oral BID    piperacillin-tazobactam  2.25 g Intravenous Q6H    polyethylene glycol  17 g Oral Daily    predniSONE  5 mg Oral Daily    sodium chloride (PF)  10 mL Intravenous Once    sodium chloride (PF)  3 mL Intracatheter Q8H    ticagrelor  90 mg Oral BID    vancomycin (VANCOCIN) IV  750 mg Intravenous Q24H              Physical Exam:   Vitals were reviewed  Temp:  [97.6  F (36.4  C)-99  F (37.2  C)] 97.6  F (36.4  C)  Pulse:  [68-91] 74  Resp:  [14-15] 14  MAP:  [88 mmHg-114 mmHg] 109 mmHg  Arterial Line BP: ()/(61-97) 158/74  SpO2:  [92 %-100 %] 94 %    Vitals:    04/23/21 1918 04/23/21 1928 04/26/21 0500   Weight: 46.2 kg (101 lb 14.4 oz) 46.2 kg (101 lb 14.4 oz) 47.8 kg (105 lb 6.1 oz)       Constitutional: Adult female seen lying in bed, in NAD. Awake, alert, interactive.  HEENT: NC/AT, EOMI, sclera clear, conjunctiva normal, nasal cannula in place, OP with MMM  Respiratory: No increased work of breathing, CTAB, no crackles or wheezing.  Cardiovascular: RRR, no murmur noted. No peripheral edema.  GI: Normal bowel sounds, soft, non-distended and non-tender.  Skin: Warm, dry, well-perfused. Scattered bruising noted on upper extremities. Wound vac in place in b/l groin  Musculoskeletal: Extremities grossly normal, non-tender.   Neurologic: A&O. Answers questions appropriately, speech normal. Moves all extremities spontaneously.  Neuropsychiatric: Calm. Affect appropriate to situation.  Vascular access:  PIV on LUE CDI, non-tender, no surrounding erythema.           Laboratory Data:   Microbiology:  Culture Micro   Date Value Ref Range Status   04/27/2021  No growth after 1 hour  Preliminary   04/26/2021 No growth after 12 hours  Preliminary   04/26/2021 No growth after 15 hours  Preliminary   04/25/2021 Culture negative monitoring continues  Preliminary   04/25/2021 Culture negative after 16 hours  Preliminary   04/25/2021 Light growth  Staphylococcus aureus   (A)  Preliminary   04/25/2021 Culture in progress  Preliminary   04/25/2021 Heavy growth  Staphylococcus aureus   (A)  Preliminary   04/25/2021 Culture in progress  Preliminary   04/25/2021 Culture negative monitoring continues  Preliminary   04/25/2021 Culture negative after 16 hours  Preliminary       Metabolic Studies       Recent Labs   Lab Test 04/27/21  0830 04/27/21  0204 04/26/21  1328 04/26/21  0459 04/25/21 2011 04/25/21 2011 04/08/21  0506 04/08/21  0506   NA  --  138 138 140   < > 137   < > 141   POTASSIUM  --  3.8 4.1 4.4   < > 4.7   < > 3.6   CHLORIDE  --  110* 111* 112*   < >  --    < > 110*   CO2  --  23 18* 18*   < >  --    < > 24   ANIONGAP  --  5 10 11   < >  --    < > 8   BUN  --  42* 41* 38*   < >  --    < > 24   CR  --  1.68* 1.58* 1.51*   < >  --    < > 1.79*   GFRESTIMATED  --  31* 33* 35*   < >  --    < > 29*   GLC  --  110* 146* 138*   < > 132*   < > 117*   A1C  --   --   --   --   --   --   --  5.6   KAYKAY  --  8.6 8.4* 8.4*   < >  --    < > 8.4*   PHOS  --  3.4 4.5 4.6*   < >  --   --   --    MAG 2.8* 3.2* 1.9 2.0   < >  --   --   --    LACT  --   --   --   --   --  0.9   < >  --     < > = values in this interval not displayed.       Hepatic Studies    Recent Labs   Lab Test 04/23/21  1935 08/19/19  1044 08/19/19  1044   BILITOTAL 0.5   < > 0.4   ALKPHOS 169*   < > 120   ALBUMIN 2.0*   < > 3.4   AST 16   < > 16   ALT 15   < > 20   LDH  --   --  164    < > = values in this interval not displayed.       Pancreatitis testing    Recent Labs   Lab Test 04/08/21  0506   TRIG 162*       Hematology Studies      Recent Labs   Lab Test 04/27/21  0204 04/26/21  1328 04/26/21  0454  04/23/21  1935 04/23/21  1935 04/09/21  0520 04/08/21  0506 04/08/21  0506   WBC 14.1* 15.1* 13.4*   < > 13.4* 10.3   < > 11.9*   ANEU  --   --   --   --  11.8* 8.8*  --  10.3*   ALYM  --   --   --   --  0.2* 0.4*  --  0.4*   SAUNDRA  --   --   --   --  1.2 0.9  --  1.1   AEOS  --   --   --   --  0.0 0.1  --  0.0   HGB 8.1* 8.5* 8.2*   < > 9.1* 9.0*   < > 7.5*   HCT 25.3* 26.5* 25.3*   < > 29.5* 27.5*   < > 23.1*    391 434   < > 529* 97*   < > 105*    < > = values in this interval not displayed.       Arterial Blood Gas Testing    Recent Labs   Lab Test 04/25/21 2011   PH 7.30*   PCO2 43   PO2 110*   HCO3 21   O2PER 0.44             Imaging:     Recent Results (from the past 48 hour(s))   XR Chest Port 1 View    Narrative    XR CHEST PORT 1 VIEW  4/25/2021 9:52 PM      HISTORY: intrap op hemodynamic changes    COMPARISON: Chest abdomen and pelvis CT 3/22/2021    FINDINGS: AP view of the chest. The cardiac silhouette is not  enlarged. Known thoracic aortic aneurysm. Emphysematous changes.  Patchy opacities in the right greater than left lung bases. Right  upper lobe opacities, consistent with scarring on prior CT. Trace left  pleural effusion. Accessory azygous lobe. No pneumothorax.      Impression    IMPRESSION:   1. Patchy right greater than left basilar opacities, atelectasis  versus pneumonia.  2. Trace left pleural effusion.    I have personally reviewed the examination and initial interpretation  and I agree with the findings.    ELISSA RIVAS MD   Cardiac Catheterization    Narrative    Conclusions  -Single vessel CAD with patent RCA stent and severe diffuse RCA vasospasm   and mild LCA/LAD/LCx vasospasm. All resolved with administration of IC   nitroglycerin.     Recommendations  -Additional 90 mg of PO ticagrelor given in CCL  -Can stop heparin  -Continuous IV nitroglycerin  -Stop BB  -Consider adding calcium channel blocker for coronary spasm cautiously   given negative ionotropic effect  especially with nondihydropyridine CCBs  -Dual antiplatelet treatment with low-dose aspirin daily and 90 mg   ticagrelor twice daily as scheduled for recently placed LIUDMILA and recent   STEMI.  -Low-dose aspirin po daily lifelong.  -Bedrest per protocol/usual post procedure care with TR band  -High intensity statin for CAD   -ACE inhibitor/ARB, and aldosterone blockade for CAD/STEMI ischemic   cardiomyopathy   -Continued medical management including lifestyle modifications for CV   risk factor optimizations  -Cardiac rehabilitation     Echo Limited    Narrative    [id number]  [id number]  [id number]  [id number]^[email or web address]^JAIME^MILA     [facility],[place]  Echocardiography Laboratory  [address] [place] [address]     Name: ERASMO MERINO  MRN: [id number]  : 1950  Study Date: 2021 10:18 AM  Age: 70 yrs  Gender: Female  Patient Location: [facility]  Reason For Study: MI  Ordering Physician: JAIME DAWKINS  Performed By: Marcio Hirsch     BSA: 1.5 m2  Height: 62 in  Weight: 105 lb  HR: 67  BP: 141/70 mmHg  ______________________________________________________________________________  Procedure  Limited Portable Echo Adult. Optison (NDC #3368-5933-39) given intravenously.  Patient was given 4 ml mixture of 3 ml Optison and 6 ml saline. 5 ml wasted.  ______________________________________________________________________________  Interpretation Summary  Moderately (EF 35-40%) reduced left ventricular function is present.  Hypokinesis of the inferior, basal anterior, basal inferoseptal and apical  walls with mid/basal septal aneurysm.  Right ventricular function, chamber size, wall motion, and thickness are  normal.  Small pericardial effusion, with a maximum diameter of 1.3 cm, adjacent to the  right atrium, with no hemodynamic significance.  A left pleural effusion is present.     This study was compared with the study from 2021. No significant  changes  noted.  ______________________________________________________________________________  Left Ventricle  Moderately (EF 35-40%) reduced left ventricular function is present.  Hypokinesis of the inferior, basal anterior, basal inferoseptal and apical  walls with mid/basal septal aneurysm.     Right Ventricle  Right ventricular function, chamber size, wall motion, and thickness are  normal.     Atria  Both atria appear normal.     Mitral Valve  The mitral valve is normal. Mitral leaflet thickness is normal . Trace mitral  insufficiency is present.     Aortic Valve  Aortic valve is normal in structure and function. The aortic valve is  tricuspid.     Tricuspid Valve  The tricuspid valve is normal. Trace tricuspid insufficiency is present.     Pulmonic Valve  The pulmonic valve is normal.     Pericardium  Small pericardial effusion, with a maximum diameter of 1.3 cm, adjacent to the  right atrium, with no hemodynamic significance.     Miscellaneous  A left pleural effusion is present.     Compared to Previous Study  This study was compared with the study from 4/24/2021 . No significant changes  noted.     Attestation  I have personally viewed the imaging and agree with the interpretation and  report as documented by the fellow, Otto Wise, and/or edited by me.  ______________________________________________________________________________  Doppler Measurements & Calculations  MV E max carly: 56.3 cm/sec     ______________________________________________________________________________  Report approved by: Saúl PATEL 04/26/2021 12:23 PM

## 2021-04-27 NOTE — PROGRESS NOTES
SURGICAL ICU PROGRESS NOTE    ASSESSMENT: Maribel Yang is a 70 year old female with a history of infrarenal AAA, kidney transplant, s/p EVAR with fem-fem bypass on 4/6 c/b inferior MI with LIUDMILA in the RCA. Admitted on 4/23 with infected R femoral graft. S/p R groin irrigation and debridement 4/24, and s/p groin washout, sartorius muscle flap, femoral femoral bypass with cadaveric artery 4/25, found to have diffuse coronary vasospasm post op.    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    24 hrs: RTOR for vac change, washout, debridement sartorius muscle, veraflo vac placement. Troponin downtrending, now 12. No further chest pain.    Today's changes:  - Cardiac diet, NPO at midnight for ROLY in OR tomorrow  - no longer trend troponin  - ROLY in OR tomorrow  - Vascular to do bedside vac change today  - daily blood cultures  - transfer to floor once vascular does vac change  - d/c a line    PLAN:   Neuro/ pain/ sedation:  - Monitor neurological status. Notify the MD/DO for any acute changes in exam.  - Tylenol, oxy for pain.    HEENT:  - PTA Flonase     Pulmonary care:   # Lung cancer in remission  - Supplemental oxygen to keep saturation above 92 %.  - Incentive spirometer every 15- 30 minutes when awake.     Cardiovascular:  # AAA, TAA s/p EVAR  # Fem-fem bypass c/b groin wound s/p washout and new bypass  # Inferior MI s/p LIUDMILA  # Diffuse coronary vasospasm  # HTN  - Brillinta   - ASA  - atorvastatin, hold PTA lisinopril, metoprolol   - Imdur 60mg qday  - appreciate Cardiology recs     GI care/ Nutrition:   - Cardiac diet, NPO at midnight for ROLY  - Miralax     Fluids/ Electrolytes/ renal:   # S/p kidney transplant  # AMY  - Baseline Cr ~ 1.3-1.6  - appreciate Transplant Nephrology recs  - NS@50     Endocrine:    - No management indication     ID/ Antibiotics:  # Immunosuppression  # Infected graft- 4/23 1/2 blood  culture for MSSA, 4/24 and 4/25 wound cx +S aureus   - Zosyn/vanc  - Transplant ID following  - PTA prednisone, MMF (sirolimus transitioned to MMF for keri-op period)  - ROLY w/ Cardiology tomorrow   - Daily blood cultures (NGTD since 4/23 1/2 positive for MSSA)  - no PICC until 48 hrs w/ negative blood cultures     Heme:   # Acute blood loss anemia, EBL 1L.  - HGB stable ~8  - Brillinta, aspirin     Prophylaxis:    - Mechanical prophylaxis for DVT     Lines/ tubes/ drains:  - A line- d/c  - pIV x2- EJ  - Wound vac bilateral groin     Disposition:  - Transfer to floor once undergoes vac change by vascular    CODE:  - Full    Patient seen and discussed with SICU staff, Dr. Booth, who is in agreement with plan.    Clair Mack MD  Surgery, PGY2  [id number]    - - - - - - - - - - - - - - - - - - - - - - - - - - - - - - - - - - - - - - - - - - - - - - - - - - - - - - - - - - - - - - - - - - - - -     PRIMARY TEAM: Vascular  PRIMARY PHYSICIAN: Joshua    SUBJECTIVE: Patient denies any chest pain or pressure. Pain well controlled. No shortness of breath. No nausea. Has had multiple bowel movements.    PHYSICAL EXAMINATION:  Temp:  [97.5  F (36.4  C)-99  F (37.2  C)] 98  F (36.7  C)  Pulse:  [60-91] 84  Resp:  [14-20] 14  BP: (135-146)/(85-96) 146/86  MAP:  [88 mmHg-116 mmHg] 99 mmHg  Arterial Line BP: ()/(61-99) 142/70  SpO2:  [92 %-100 %] 98 %  General: Alert, well-appearing in no acute distress.  HEENT: Normocephalic  Neck: Supple  Respiratory: NLB on 4L, CTAB  Cardiovascular: RRR  Gastrointestinal: Abdomen soft, non-distended, non-tender  Extremities: bilateral groin wound vacs. WWP.  Neuro: no focal deficits    LABS: Reviewed.   Arterial Blood Gases   Recent Labs   Lab 04/25/21 2011 04/25/21  1912 04/24/21  1313   PH 7.30* 7.32* 7.46*   PCO2 43 39 29*   PO2 110* 124* 58*   HCO3 21 20* 21     Complete Blood Count   Recent Labs   Lab 04/27/21  0204 04/26/21  1328 04/26/21  0459 04/26/21  0157   WBC 14.1*  15.1* 13.4* 14.7*   HGB 8.1* 8.5* 8.2* 8.3*    391 434 454*     Basic Metabolic Panel  Recent Labs   Lab 04/27/21  0204 04/26/21  1328 04/26/21  0459 04/26/21  0157    138 140 136   POTASSIUM 3.8 4.1 4.4 4.5   CHLORIDE 110* 111* 112* 108   CO2 23 18* 18* 20   BUN 42* 41* 38* 38*   CR 1.68* 1.58* 1.51* 1.53*   * 146* 138* 136*     Liver Function Tests  Recent Labs   Lab 04/25/21  2200 04/23/21  1935   AST  --  16   ALT  --  15   ALKPHOS  --  169*   BILITOTAL  --  0.5   ALBUMIN  --  2.0*   INR 1.18*  --      Pancreatic Enzymes  No lab results found in last 7 days.  Coagulation Profile  Recent Labs   Lab 04/25/21 2200   INR 1.18*   PTT 26     IMAGING:  No new imaging

## 2021-04-27 NOTE — PROGRESS NOTES
STAFF NOTE:  No issues overnight  No complaint of chest pain    Exam awake, lucid, appropriate  Breathing easily  Groin sites look fine; wound vacs not purulent    Labs troponin downtrending nicely now  Blood cx on both the 24th & 25th growing staph aureus; the ones yesterday NGTD    UOP adeqaute    CXR trace pleural effusion and basilar opacity    This is a 70 year old woman with recent inferior STEMI requiring LIUDMILA to RCA after high risk vascular surgery 3 weeks ago now with recurrences of inferior RHEA elevations and anterolateral depressions on post-op ECG in PACU after holding home Brilenta and aspirin prior to groin washout, redo fem-fem with cadaveric bypass, and flap.  Cardiac cathterization revealed diffuse coronary spasm with resolution after initiation of nitroglycerin.    She remains chest pain free, and we have transitioned fully to oral meds.  She will get a ROLY today given her persistently positive blood cultures with staph aureus, then should be able to go to the floor with a diet.  Her grayson and arterial line can be removed.       ACUTE PAIN:  -tylenol + oxycodone    PVD:  -s/p EVAR with perigraft surgical site infection at site of fem-fem bypass, now s/p groin washout, sartorius muscle flap and redo fem fem bypass with cadaveric artery     GRAFT INFECTION:  -ROLY today given staph aureus in blood culture  -continue daily blood cultures until negative x2 before placing long-term PICC  -zosyn + vanco will probably transition to nafcillin +/- something for empiric anaerobic coverage in a day or so after cultures finalized; will defer to ID service    NASAL CONGESTION:  -home decongestant    CORONARY VASOSPASM:  -imdur 60mg every day;   -may add amlodipine 5mg tomorrow   -no plans for anticoagulation in the near future; eventually could transition to an anticoagulant (presumably a DOAC) + Brilenta per cardiology    CKD:  -hx OKT; on mycophenolate + prednisone 5 for immunosuppression  -creatinine overall  stable and UOP adequate    HLD:  -home lipitor    CONSTIPATION:  -home miralax    MISC:  -Code status is full code  -family updated by DAYANA  -SQH not necessary at this time; PPI not necessary, although she is on steroids + dual antiplatelet therapy.  -lines: will get PICC once bblood cultures clearly negative given anticipated need for long-term IV antibiotics  -grayson not necessary.; can come out  -anticipate discharge to home with IV antibiotics in a few days.    Billing statement: 35min of E&M time.    JAIDEN Booth MD  Clinical   Anesthesia / Critical Care  *[id number]

## 2021-04-27 NOTE — PROGRESS NOTES
[facility]  Transplant Nephrology Progress Note  Date of Admission:  4/23/2021  Today's Date: 04/27/2021  Requesting physician: Raven Lewis MD    Recommendations:  - continue MPA and prednisone 5 mg.  - Renal graft function is stable, will continue to follow.     Assessment & Plan   # LDKT: Stable   - Baseline Creatinine: ~ 1.3-1.6   - Proteinuria: Not checked recently   - Date DSA Last Checked: Apr/2018      Latest DSA: No   - BK Viremia: Not checked recently due to time from transplant   - Kidney Tx Biopsy: No    # Immunosuppression: Mycophenolic acid (dose 360 mg every 12 hours) and Prednisone (dose 5 mg daily)   - Changes: No    # Infection Prophylaxis:   - PJP: None    # Hypertension: Controlled;  Goal BP: < 130/80   - Volume status: Euvolemic     - Changes: No    # Anemia in Chronic Renal Disease: Hgb: Stable      JONATHAN: No   - Iron studies: Not checked recently    # Mineral Bone Disorder:   - Vitamin D; level: Not checked recently        On supplement: No  - Calcium; level: Normal        On supplement: No    # Electrolytes:   - Potassium; level: Normal        On supplement: No  - Bicarbonate; level: Normal        On supplement: No    # infrarenal AAA, s/p EVAR with fem-fem bypass on 4/6 c/b infected R femoral graft s/p  Debridement. management per surgery.    # MSSA bacteremia 2/2 groin abscess and endovascular fem-fem bypass graft infection now s/p I&D, washout and new graft placement. ID following.     # Transplant History:  Etiology of Kidney Failure: Alport's syndrome  Tx: LDKT  Transplant: 9/20/2004 (Kidney)  Significant changes in immunosuppression: None  Significant transplant-related complications: Recurrent Skin Cancers    Recommendations were communicated to the primary team via this note.    Seen and discussed with Dr. Pili Del Angel MD  Pager: [phone]    Interval:  No overnight events, pt was transferred from ICU to stepdown unit.  "Denies any active complaints.     Review of Systems    5 point Review of Systems is negative other than noted in the interval or here.    Physical Exam   Temp  Av.6  F (37  C)  Min: 97.2  F (36.2  C)  Max: 100.7  F (38.2  C)  Arterial Line BP  Min: 49/43  Max: 172/90  Arterial Line MAP (mmHg)  Av.1 mmHg  Min: 48 mmHg  Max: 131 mmHg      Pulse  Av.6  Min: 50  Max: 95 Resp  Av.6  Min: 10  Max: 40  SpO2  Av.2 %  Min: 88 %  Max: 100 %     /72 (BP Location: Right arm)   Pulse 89   Temp 98.9  F (37.2  C) (Oral)   Resp 16   Ht 1.575 m (5' 2\")   Wt 47.8 kg (105 lb 6.1 oz)   SpO2 98%   BMI 19.27 kg/m     Date 21 07 - 21 0659   Shift 7008-0414 3145-1524 0770-5982 24 Hour Total   INTAKE   P.O. 100   100   I.V. 227.8   227.8   Blood Components 300   300   Shift Total(mL/kg) 627.8(13.13)   627.8(13.13)   OUTPUT   Urine 420   420   Blood 20   20   Shift Total(mL/kg) 440(9.21)   440(9.21)   Weight (kg) 47.8 47.8 47.8 47.8      Admit Weight: 46.2 kg (101 lb 14.4 oz)     GENERAL APPEARANCE: alert and no distress  HENT: mouth without ulcers or lesions  LYMPHATICS: no cervical or supraclavicular nodes  RESP: lungs clear to auscultation - no rales, rhonchi or wheezes  CV: regular rhythm, normal rate, no rub, no murmur  EDEMA: no LE edema bilaterally  ABDOMEN: soft, ND  MS: extremities normal - no gross deformities noted, no evidence of inflammation in joints, no muscle tenderness  SKIN: no rash    Data   CMP  Recent Labs   Lab 21  0830 21  0204 21  1328 21  0459 21  0157 21  2125 21  1935 21   NA  --  138 138 140 136 139   < > 132*   POTASSIUM  --  3.8 4.1 4.4 4.5 4.2   < > 4.4   CHLORIDE  --  110* 111* 112* 108 111*   < > 102   CO2  --  23 18* 18* 20 19*   < > 21   ANIONGAP  --  5 10 11 8 9   < > 9   GLC  --  110* 146* 138* 136* 143*   < > 112*   BUN  --  42* 41* 38* 38* 33*   < > 36*   CR  --  1.68* 1.58* 1.51* 1.53* 1.35*   < > " 1.63*   GFRESTIMATED  --  31* 33* 35* 35* 40*   < > 32*   GFRESTBLACK  --  36* 38* 41* 40* 47*   < > 37*   KAYKAY  --  8.6 8.4* 8.4* 8.8 8.3*   < > 9.2   MAG 2.8* 3.2* 1.9 2.0  --  2.0   < >  --    PHOS  --  3.4 4.5 4.6*  --  4.1  --   --    PROTTOTAL  --   --   --   --   --   --   --  6.1*   ALBUMIN  --   --   --   --   --   --   --  2.0*   BILITOTAL  --   --   --   --   --   --   --  0.5   ALKPHOS  --   --   --   --   --   --   --  169*   AST  --   --   --   --   --   --   --  16   ALT  --   --   --   --   --   --   --  15    < > = values in this interval not displayed.     CBC  Recent Labs   Lab 04/27/21  0204 04/26/21  1328 04/26/21  0459 04/26/21  0157   HGB 8.1* 8.5* 8.2* 8.3*   WBC 14.1* 15.1* 13.4* 14.7*   RBC 2.91* 3.07* 2.82* 2.91*   HCT 25.3* 26.5* 25.3* 26.2*   MCV 87 86 90 90   MCH 27.8 27.7 29.1 28.5   MCHC 32.0 32.1 32.4 31.7   RDW 18.4* 17.6* 16.1* 15.9*    391 434 454*     INR  Recent Labs   Lab 04/25/21  2200   INR 1.18*   PTT 26     ABG  Recent Labs   Lab 04/25/21 2011 04/25/21  1912 04/24/21  1313   PH 7.30* 7.32* 7.46*   PCO2 43 39 29*   PO2 110* 124* 58*   HCO3 21 20* 21   O2PER 0.44 43.0 30.0      Urine Studies  Recent Labs   Lab Test 01/20/14  0841   COLOR Light Yellow   APPEARANCE Clear   URINEGLC Negative   URINEBILI Negative   URINEKETONE Negative   SG 1.013   UBLD Negative   URINEPH 5.0   PROTEIN Negative   NITRITE Negative   LEUKEST Moderate*   RBCU 1   WBCU 2     Recent Labs   Lab Test 04/24/18  1812 01/31/17  1030 06/03/16  0900 10/22/15  1024 06/25/15  1007 01/14/15  0902 04/07/14  0921   UTPG 1.04* 1.67* 0.76* 0.44* 0.31* 0.27* 0.05     PTH  Recent Labs   Lab Test 06/12/19  1810   PTHI 89*     Iron Studies  No lab results found.    IMAGING:  All imaging studies reviewed by me.      This patient has been seen and evaluated by me, Flaca Alejandre MD.  I have reviewed the note and agree with plan of care as documented by the fellow.    Flaca Alejandre MD on 4/27/2021

## 2021-04-27 NOTE — TELEPHONE ENCOUNTER
Reason for Call:  Form, our goal is to have forms completed with 72 hours, however, some forms may require a visit or additional information.    Type of letter, form or note:  Home Health Certification and plan of care   Certification period- 4/15/21-6/13/21    Who is the form from?: Home care    Where did the form come from: form was faxed in    What clinic location was the form placed at?: [facility]    Where the form was placed: Dr Martinez's Box/Folder    What number is listed as a contact on the form?: [phone]       Additional comments:     Call taken on 4/27/2021 at 9:52 AM by Martha Mendieta

## 2021-04-28 ENCOUNTER — APPOINTMENT (OUTPATIENT)
Dept: CARDIOLOGY | Facility: CLINIC | Age: 71
DRG: 252 | End: 2021-04-28
Attending: DIETITIAN, REGISTERED
Payer: COMMERCIAL

## 2021-04-28 ENCOUNTER — ANESTHESIA (OUTPATIENT)
Dept: SURGERY | Facility: CLINIC | Age: 71
DRG: 252 | End: 2021-04-28
Payer: COMMERCIAL

## 2021-04-28 LAB
ANION GAP SERPL CALCULATED.3IONS-SCNC: 5 MMOL/L (ref 3–14)
BLD PROD TYP BPU: NORMAL
BLD UNIT ID BPU: 0
BLOOD PRODUCT CODE: NORMAL
BPU ID: NORMAL
BUN SERPL-MCNC: 36 MG/DL (ref 7–30)
CALCIUM SERPL-MCNC: 8.6 MG/DL (ref 8.5–10.1)
CHLORIDE SERPL-SCNC: 112 MMOL/L (ref 94–109)
CO2 SERPL-SCNC: 21 MMOL/L (ref 20–32)
CREAT SERPL-MCNC: 1.46 MG/DL (ref 0.52–1.04)
ERYTHROCYTE [DISTWIDTH] IN BLOOD BY AUTOMATED COUNT: 18.3 % (ref 10–15)
GFR SERPL CREATININE-BSD FRML MDRD: 37 ML/MIN/{1.73_M2}
GLUCOSE SERPL-MCNC: 87 MG/DL (ref 70–99)
HCT VFR BLD AUTO: 25.6 % (ref 35–47)
HGB BLD-MCNC: 7.8 G/DL (ref 11.7–15.7)
MAGNESIUM SERPL-MCNC: 2.7 MG/DL (ref 1.6–2.3)
MCH RBC QN AUTO: 27.9 PG (ref 26.5–33)
MCHC RBC AUTO-ENTMCNC: 30.5 G/DL (ref 31.5–36.5)
MCV RBC AUTO: 91 FL (ref 78–100)
PHOSPHATE SERPL-MCNC: 2.8 MG/DL (ref 2.5–4.5)
PLATELET # BLD AUTO: 292 10E9/L (ref 150–450)
POTASSIUM SERPL-SCNC: 3.8 MMOL/L (ref 3.4–5.3)
RBC # BLD AUTO: 2.8 10E12/L (ref 3.8–5.2)
SODIUM SERPL-SCNC: 138 MMOL/L (ref 133–144)
TRANSFUSION STATUS PATIENT QL: NORMAL
TRANSFUSION STATUS PATIENT QL: NORMAL
WBC # BLD AUTO: 13.3 10E9/L (ref 4–11)

## 2021-04-28 PROCEDURE — 250N000009 HC RX 250: Performed by: NURSE ANESTHETIST, CERTIFIED REGISTERED

## 2021-04-28 PROCEDURE — 93325 DOPPLER ECHO COLOR FLOW MAPG: CPT | Mod: 26 | Performed by: STUDENT IN AN ORGANIZED HEALTH CARE EDUCATION/TRAINING PROGRAM

## 2021-04-28 PROCEDURE — 250N000012 HC RX MED GY IP 250 OP 636 PS 637: Performed by: SURGERY

## 2021-04-28 PROCEDURE — 250N000013 HC RX MED GY IP 250 OP 250 PS 637: Performed by: STUDENT IN AN ORGANIZED HEALTH CARE EDUCATION/TRAINING PROGRAM

## 2021-04-28 PROCEDURE — 36415 COLL VENOUS BLD VENIPUNCTURE: CPT | Performed by: NURSE PRACTITIONER

## 2021-04-28 PROCEDURE — 120N000003 HC R&B IMCU UMMC

## 2021-04-28 PROCEDURE — 93325 DOPPLER ECHO COLOR FLOW MAPG: CPT

## 2021-04-28 PROCEDURE — 370N000017 HC ANESTHESIA TECHNICAL FEE, PER MIN

## 2021-04-28 PROCEDURE — 36415 COLL VENOUS BLD VENIPUNCTURE: CPT | Performed by: STUDENT IN AN ORGANIZED HEALTH CARE EDUCATION/TRAINING PROGRAM

## 2021-04-28 PROCEDURE — 80048 BASIC METABOLIC PNL TOTAL CA: CPT | Performed by: STUDENT IN AN ORGANIZED HEALTH CARE EDUCATION/TRAINING PROGRAM

## 2021-04-28 PROCEDURE — 258N000003 HC RX IP 258 OP 636: Performed by: NURSE ANESTHETIST, CERTIFIED REGISTERED

## 2021-04-28 PROCEDURE — 258N000003 HC RX IP 258 OP 636: Performed by: SURGERY

## 2021-04-28 PROCEDURE — 250N000013 HC RX MED GY IP 250 OP 250 PS 637: Performed by: DIETITIAN, REGISTERED

## 2021-04-28 PROCEDURE — 76376 3D RENDER W/INTRP POSTPROCES: CPT | Mod: 26 | Performed by: STUDENT IN AN ORGANIZED HEALTH CARE EDUCATION/TRAINING PROGRAM

## 2021-04-28 PROCEDURE — 250N000011 HC RX IP 250 OP 636: Performed by: NURSE ANESTHETIST, CERTIFIED REGISTERED

## 2021-04-28 PROCEDURE — 99232 SBSQ HOSP IP/OBS MODERATE 35: CPT | Mod: 25 | Performed by: INTERNAL MEDICINE

## 2021-04-28 PROCEDURE — 999N000141 HC STATISTIC PRE-PROCEDURE NURSING ASSESSMENT

## 2021-04-28 PROCEDURE — 99024 POSTOP FOLLOW-UP VISIT: CPT | Performed by: NURSE PRACTITIONER

## 2021-04-28 PROCEDURE — 250N000013 HC RX MED GY IP 250 OP 250 PS 637: Performed by: SURGERY

## 2021-04-28 PROCEDURE — 87040 BLOOD CULTURE FOR BACTERIA: CPT | Performed by: NURSE PRACTITIONER

## 2021-04-28 PROCEDURE — 250N000013 HC RX MED GY IP 250 OP 250 PS 637: Performed by: ANESTHESIOLOGY

## 2021-04-28 PROCEDURE — 99233 SBSQ HOSP IP/OBS HIGH 50: CPT | Performed by: STUDENT IN AN ORGANIZED HEALTH CARE EDUCATION/TRAINING PROGRAM

## 2021-04-28 PROCEDURE — 83735 ASSAY OF MAGNESIUM: CPT | Performed by: STUDENT IN AN ORGANIZED HEALTH CARE EDUCATION/TRAINING PROGRAM

## 2021-04-28 PROCEDURE — 93320 DOPPLER ECHO COMPLETE: CPT | Mod: 26 | Performed by: STUDENT IN AN ORGANIZED HEALTH CARE EDUCATION/TRAINING PROGRAM

## 2021-04-28 PROCEDURE — 250N000011 HC RX IP 250 OP 636: Performed by: NURSE PRACTITIONER

## 2021-04-28 PROCEDURE — 85027 COMPLETE CBC AUTOMATED: CPT | Performed by: STUDENT IN AN ORGANIZED HEALTH CARE EDUCATION/TRAINING PROGRAM

## 2021-04-28 PROCEDURE — 76376 3D RENDER W/INTRP POSTPROCES: CPT

## 2021-04-28 PROCEDURE — 710N000010 HC RECOVERY PHASE 1, LEVEL 2, PER MIN

## 2021-04-28 PROCEDURE — 93312 ECHO TRANSESOPHAGEAL: CPT | Mod: 26 | Performed by: STUDENT IN AN ORGANIZED HEALTH CARE EDUCATION/TRAINING PROGRAM

## 2021-04-28 PROCEDURE — 250N000025 HC SEVOFLURANE, PER MIN

## 2021-04-28 PROCEDURE — 84100 ASSAY OF PHOSPHORUS: CPT | Performed by: STUDENT IN AN ORGANIZED HEALTH CARE EDUCATION/TRAINING PROGRAM

## 2021-04-28 RX ORDER — LIDOCAINE HYDROCHLORIDE 20 MG/ML
INJECTION, SOLUTION INFILTRATION; PERINEURAL PRN
Status: DISCONTINUED | OUTPATIENT
Start: 2021-04-28 | End: 2021-04-28

## 2021-04-28 RX ORDER — SODIUM CHLORIDE, SODIUM LACTATE, POTASSIUM CHLORIDE, CALCIUM CHLORIDE 600; 310; 30; 20 MG/100ML; MG/100ML; MG/100ML; MG/100ML
INJECTION, SOLUTION INTRAVENOUS CONTINUOUS
Status: DISCONTINUED | OUTPATIENT
Start: 2021-04-28 | End: 2021-04-28 | Stop reason: HOSPADM

## 2021-04-28 RX ORDER — ONDANSETRON 4 MG/1
4 TABLET, ORALLY DISINTEGRATING ORAL EVERY 30 MIN PRN
Status: DISCONTINUED | OUTPATIENT
Start: 2021-04-28 | End: 2021-04-28 | Stop reason: HOSPADM

## 2021-04-28 RX ORDER — MEPERIDINE HYDROCHLORIDE 25 MG/ML
12.5 INJECTION INTRAMUSCULAR; INTRAVENOUS; SUBCUTANEOUS
Status: DISCONTINUED | OUTPATIENT
Start: 2021-04-28 | End: 2021-04-28 | Stop reason: HOSPADM

## 2021-04-28 RX ORDER — PROPOFOL 10 MG/ML
INJECTION, EMULSION INTRAVENOUS PRN
Status: DISCONTINUED | OUTPATIENT
Start: 2021-04-28 | End: 2021-04-28

## 2021-04-28 RX ORDER — SODIUM CHLORIDE, SODIUM LACTATE, POTASSIUM CHLORIDE, CALCIUM CHLORIDE 600; 310; 30; 20 MG/100ML; MG/100ML; MG/100ML; MG/100ML
INJECTION, SOLUTION INTRAVENOUS CONTINUOUS PRN
Status: DISCONTINUED | OUTPATIENT
Start: 2021-04-28 | End: 2021-04-28

## 2021-04-28 RX ORDER — DEXAMETHASONE SODIUM PHOSPHATE 4 MG/ML
INJECTION, SOLUTION INTRA-ARTICULAR; INTRALESIONAL; INTRAMUSCULAR; INTRAVENOUS; SOFT TISSUE PRN
Status: DISCONTINUED | OUTPATIENT
Start: 2021-04-28 | End: 2021-04-28

## 2021-04-28 RX ORDER — FENTANYL CITRATE 50 UG/ML
25-50 INJECTION, SOLUTION INTRAMUSCULAR; INTRAVENOUS EVERY 5 MIN PRN
Status: DISCONTINUED | OUTPATIENT
Start: 2021-04-28 | End: 2021-04-28 | Stop reason: HOSPADM

## 2021-04-28 RX ORDER — ONDANSETRON 2 MG/ML
4 INJECTION INTRAMUSCULAR; INTRAVENOUS EVERY 30 MIN PRN
Status: DISCONTINUED | OUTPATIENT
Start: 2021-04-28 | End: 2021-04-28 | Stop reason: HOSPADM

## 2021-04-28 RX ORDER — ONDANSETRON 2 MG/ML
INJECTION INTRAMUSCULAR; INTRAVENOUS PRN
Status: DISCONTINUED | OUTPATIENT
Start: 2021-04-28 | End: 2021-04-28

## 2021-04-28 RX ADMIN — TICAGRELOR 90 MG: 90 TABLET ORAL at 19:35

## 2021-04-28 RX ADMIN — LIDOCAINE HYDROCHLORIDE 60 MG: 20 INJECTION, SOLUTION INFILTRATION; PERINEURAL at 10:22

## 2021-04-28 RX ADMIN — FLUTICASONE PROPIONATE 2 SPRAY: 50 SPRAY, METERED NASAL at 08:08

## 2021-04-28 RX ADMIN — ASPIRIN 81 MG CHEWABLE TABLET 81 MG: 81 TABLET CHEWABLE at 08:09

## 2021-04-28 RX ADMIN — MELATONIN TAB 3 MG 6 MG: 3 TAB at 21:56

## 2021-04-28 RX ADMIN — ONDANSETRON 4 MG: 2 INJECTION INTRAMUSCULAR; INTRAVENOUS at 10:38

## 2021-04-28 RX ADMIN — MYCOPHENOLIC ACID 360 MG: 360 TABLET, DELAYED RELEASE ORAL at 19:35

## 2021-04-28 RX ADMIN — PROPOFOL 90 MG: 10 INJECTION, EMULSION INTRAVENOUS at 10:22

## 2021-04-28 RX ADMIN — CEFAZOLIN SODIUM 2 G: 2 INJECTION, SOLUTION INTRAVENOUS at 08:14

## 2021-04-28 RX ADMIN — ISOSORBIDE MONONITRATE 60 MG: 60 TABLET, EXTENDED RELEASE ORAL at 08:08

## 2021-04-28 RX ADMIN — TICAGRELOR 90 MG: 90 TABLET ORAL at 08:09

## 2021-04-28 RX ADMIN — ATORVASTATIN CALCIUM 80 MG: 40 TABLET, FILM COATED ORAL at 08:08

## 2021-04-28 RX ADMIN — ROCURONIUM BROMIDE 30 MG: 10 INJECTION INTRAVENOUS at 10:22

## 2021-04-28 RX ADMIN — PREDNISONE 5 MG: 5 TABLET ORAL at 08:09

## 2021-04-28 RX ADMIN — SODIUM CHLORIDE: 9 INJECTION, SOLUTION INTRAVENOUS at 22:24

## 2021-04-28 RX ADMIN — CEFAZOLIN SODIUM 2 G: 2 INJECTION, SOLUTION INTRAVENOUS at 19:35

## 2021-04-28 RX ADMIN — MYCOPHENOLIC ACID 360 MG: 360 TABLET, DELAYED RELEASE ORAL at 08:09

## 2021-04-28 RX ADMIN — DEXAMETHASONE SODIUM PHOSPHATE 4 MG: 4 INJECTION, SOLUTION INTRA-ARTICULAR; INTRALESIONAL; INTRAMUSCULAR; INTRAVENOUS; SOFT TISSUE at 10:27

## 2021-04-28 RX ADMIN — SUGAMMADEX 200 MG: 100 INJECTION, SOLUTION INTRAVENOUS at 10:50

## 2021-04-28 RX ADMIN — SODIUM CHLORIDE, POTASSIUM CHLORIDE, SODIUM LACTATE AND CALCIUM CHLORIDE: 600; 310; 30; 20 INJECTION, SOLUTION INTRAVENOUS at 10:06

## 2021-04-28 ASSESSMENT — ACTIVITIES OF DAILY LIVING (ADL)
ADLS_ACUITY_SCORE: 18
ADLS_ACUITY_SCORE: 19
ADLS_ACUITY_SCORE: 18
ADLS_ACUITY_SCORE: 18
ADLS_ACUITY_SCORE: 19
ADLS_ACUITY_SCORE: 19

## 2021-04-28 ASSESSMENT — LIFESTYLE VARIABLES: TOBACCO_USE: 1

## 2021-04-28 ASSESSMENT — MIFFLIN-ST. JEOR: SCORE: 965.25

## 2021-04-28 NOTE — ANESTHESIA POSTPROCEDURE EVALUATION
Patient: Maribel Yang    Procedure(s):  ECHOCARDIOGRAM, TRANSESOPHAGEAL, INTRAOPERATIVE    Diagnosis:Endocarditis [I38]  Diagnosis Additional Information: No value filed.    Anesthesia Type:  General    Note:  Disposition: Admission   Postop Pain Control: Uneventful            Sign Out: Well controlled pain   PONV: No   Neuro/Psych: Uneventful            Sign Out: Acceptable/Baseline neuro status   Airway/Respiratory: Uneventful            Sign Out: Acceptable/Baseline resp. status   CV/Hemodynamics: Uneventful            Sign Out: Acceptable CV status; No obvious hypovolemia; No obvious fluid overload   Other NRE: NONE   DID A NON-ROUTINE EVENT OCCUR? No           Last vitals:  Vitals:    04/28/21 0740 04/28/21 0858 04/28/21 0955   BP: (!) 150/87  134/70   Pulse: 84     Resp: 16  14   Temp: 36.8  C (98.3  F)     SpO2: 94% 93% 93%       Last vitals prior to Anesthesia Care Transfer:  CRNA VITALS  4/28/2021 1029 - 4/28/2021 1124      4/28/2021             NIBP:  110/80    Pulse:  80          Electronically Signed By: Jluis Fernandes MD  April 28, 2021  11:24 AM

## 2021-04-28 NOTE — CONSULTS
Care Management Follow Up    Length of Stay (days): 5    Expected Discharge Date:  TBD     Concerns to be Addressed: discharge planning     Patient plan of care discussed at interdisciplinary rounds: Yes    Anticipated Discharge Disposition: Transitional Care  Anticipated Discharge Services: None  Anticipated Discharge DME: None    Patient/family educated on Medicare website which has current facility and service quality ratings: no  Not at this time - Pt specifically requesting referral to  TCU  Patient/Family in Agreement with the Plan: yes    Referrals Placed by CM/SW: Post Acute Facilities  Private pay costs discussed: Not applicable    Additional Information:  SW received update from RNCC that Pt is agreeable to TCU placement at discharge and is specifically requesting a referral to [place] [facility] for discharge. SW initiated referral to  TCU Liaison ([place]) today. Pt's discharge date is TBD.     SW to continue to follow and assist with discharge plan as appropriate.     EVITA Castro, LICSW  6B Intermediate Care Unit   [facility]  Phone: [phone]  Pager: [phone]

## 2021-04-28 NOTE — PROGRESS NOTES
[facility]   Cardiology Consults  Progress Note       ASSESSMENT/PLAN:  ASSESSMENT:   Maribel Yang is a 70 year old female who presents with PMHx of inferior STEMI s/p RCA stent on 4/07/2021, DVT, AAA prior fem-fem bypass s/p EVAR on 4/06/2021 who presented with  right femoral erythema that was later found to be secondary to surgical site infection and perigraft fluid collection.     She underwent groin washout, sartorius muscle flap and redo fem fem bypass with cadaveric artery on 4/25. Morning Brillinta dose held against recommendations of cardiology consult service. Post operatively was found to have inferior ST elevations and taken to cath lab, found to have diffuse coronary vasospasm with complete occlusion of RCA that reopened with nitroglycerin. She was reloaded with a total of 180 brillinta between PACU and cath lab.    Regarding prior STEMI: After EVAR on 4/6 developed significant chest comfort, EKG was noted to reveal inferior STEMI that necessitated LIUDMILA to the proximal RCA with plans for DAPT for one year. Patient was then discharged with plans to follow up with Cardiology.    #Coronary vasospasm  #Inferior STEMI s/p LIUDMILA to RCA  #HFrEF with an EF 35-40%  She underwent groin washout, sartorius muscle flap and redo fem fem bypass with cadaveric artery on 4/25. Morning Brillinta dose held against recommendations of cardiology consult service. Post operatively was found to have inferior ST elevations and taken to cath lab, found to have diffuse coronary vasospasm with complete occlusion of RCA that reopened with nitroglycerin. She was reloaded with a total of 180 brillinta between PACU and cath lab. She is chest pain/pressure free post cath on a nitroglycerin ggt. Troponin tending up. Vasospasm likely occurred due to stress of surgery as well as any pressors that she required in the OR. EKG this AM shows no ST elevations, there are TWI in inferior leads. TTE 4/26showed  EF 35-40% with unchanged inferior WMAs, troponin peaked at 29.5.    # S. Aureus bacteremia  Blood cultures positive on 4/23 for S. Aureus. This appears to have similar sensitivities to the S. Aureus that grew out of her wound cultures. She is currently on vancomycin and zosyn.  - ROLY today (4/28) without evidence of endocarditis    RECOMMEND:  - Recommend lasix 40 mg IV x1  - Ok to restart lisinopril 5mg daily from cardiac standpoint  - avoid non DHP CCBs (verapamil, diltiazem) given HFrEF. Can start amlodipine 5mg daily in the future if necessary to prevent further vasospasm    - Imdur 60mg daily  - Continue PO ASA 81mg, continue PO Brilinta 90mg BID  - Hematology has recommended lifelong DOAC for recurrent DVT. When safe from a surgical standpoint can discontinue aspirin and restart eliquis and continue ticagrelor 90mg BID.  - Continue PO Lipitor 80mg qday   - hold BB for now  - ACE inhibitor/ARB, and aldosterone blockade for CAD/STEMI ischemic cardiomyopathy (can be added outpatient if blood pressures do not tolerate)    Interval History:  No acute events overnight. Unable to tolerate bedside ROLY yesterday, plan for ROLY in OR today. Denies chest pain, SOB, lightheadedness and dizziness. She denied PND, orthopnea, nausea, vomitting, diarrhea.     Physical Exam:  Temp:  [97.4  F (36.3  C)-98.9  F (37.2  C)] 98.3  F (36.8  C)  Pulse:  [76-94] 84  Resp:  [14-18] 14  BP: (124-152)/(69-89) 134/70  MAP:  [90 mmHg-116 mmHg] 91 mmHg  Arterial Line BP: (141-169)/(61-91) 152/62  FiO2 (%):  [1 %-3 %] 1 %  SpO2:  [91 %-99 %] 93 %    I/O:   Intake/Output Summary (Last 24 hours) at 4/26/2021 0852  Last data filed at 4/26/2021 0700  Gross per 24 hour   Intake 2345.75 ml   Output 1719 ml   Net 626.75 ml       Wt:   Wt Readings from Last 5 Encounters:   04/28/21 48.2 kg (106 lb 4.2 oz)   04/10/21 48 kg (105 lb 13.1 oz)   04/01/21 42.6 kg (94 lb)   03/16/21 43.3 kg (95 lb 6.4 oz)   09/08/20 42.4 kg (93 lb 8 oz)       GEN:  NAD  Pulm: CTAB, no wheezes or rales  Cardiac: JVP 8, no murmurs  Vascular: no lower extremity edema and dopplerable pulses bilaterally  GI: soft, non distended  Neuro: CN II-XII grossly intact    Medications:    [Auto Hold] aspirin  81 mg Oral Daily     [Auto Hold] atorvastatin  80 mg Oral Daily     [Auto Hold] ceFAZolin  2 g Intravenous Q12H     [Auto Hold] fentaNYL  75 mcg Intravenous Once     [Auto Hold] fluticasone  2 spray Both Nostrils Daily     [Auto Hold] isosorbide mononitrate  60 mg Oral Daily     [Held by provider] lisinopril  5 mg Oral Daily     [Auto Hold] melatonin  6 mg Oral At Bedtime     [Auto Hold] mycophenolic acid  360 mg Oral BID     [Auto Hold] predniSONE  5 mg Oral Daily     [Auto Hold] sodium chloride (PF)  10 mL Intravenous Once     sodium chloride (PF)  3 mL Intravenous Q8H     [Auto Hold] sodium chloride (PF)  3 mL Intracatheter Q8H     [Auto Hold] ticagrelor  90 mg Oral BID       - MEDICATION INSTRUCTIONS -       lactated ringers       - MEDICATION INSTRUCTIONS -       sodium chloride Stopped (04/28/21 0909)       Labs:   CMP  Recent Labs   Lab 04/28/21  0540 04/27/21  0830 04/27/21  0204 04/26/21  1328 04/26/21  0459 04/23/21  1935 04/23/21  1935     --  138 138 140   < > 132*   POTASSIUM 3.8  --  3.8 4.1 4.4   < > 4.4   CHLORIDE 112*  --  110* 111* 112*   < > 102   CO2 21  --  23 18* 18*   < > 21   ANIONGAP 5  --  5 10 11   < > 9   GLC 87  --  110* 146* 138*   < > 112*   BUN 36*  --  42* 41* 38*   < > 36*   CR 1.46*  --  1.68* 1.58* 1.51*   < > 1.63*   GFRESTIMATED 37*  --  31* 33* 35*   < > 32*   GFRESTBLACK 42*  --  36* 38* 41*   < > 37*   KAYKAY 8.6  --  8.6 8.4* 8.4*   < > 9.2   MAG 2.7* 2.8* 3.2* 1.9 2.0   < >  --    PHOS 2.8  --  3.4 4.5 4.6*   < >  --    PROTTOTAL  --   --   --   --   --   --  6.1*   ALBUMIN  --   --   --   --   --   --  2.0*   BILITOTAL  --   --   --   --   --   --  0.5   ALKPHOS  --   --   --   --   --   --  169*   AST  --   --   --   --   --   --  16   ALT   --   --   --   --   --   --  15    < > = values in this interval not displayed.     CBC  Recent Labs   Lab 21  0540 21  0204 21  1328 21  0459   WBC 13.3* 14.1* 15.1* 13.4*   RBC 2.80* 2.91* 3.07* 2.82*   HGB 7.8* 8.1* 8.5* 8.2*   HCT 25.6* 25.3* 26.5* 25.3*   MCV 91 87 86 90   MCH 27.9 27.8 27.7 29.1   MCHC 30.5* 32.0 32.1 32.4   RDW 18.3* 18.4* 17.6* 16.1*    324 391 434     INR  Recent Labs   Lab 21  2200   INR 1.18*     Arterial Blood Gas  Recent Labs   Lab 21  1912 21  1313   PH 7.30* 7.32* 7.46*   PCO2 43 39 29*   PO2 110* 124* 58*   HCO3 21 20* 21   O2PER 0.44 43.0 30.0       Diagnostics:    EC2021      Echo:  2021  Moderately (EF 35-40%) reduced left ventricular function is present.  Hypokinesis of the inferior, basal anterior, basal inferoseptal and apical  walls with mid/basal septal aneurysm.  Right ventricular function, chamber size, wall motion, and thickness are  normal.  Small pericardial effusion, with a maximum diameter of 1.3 cm, adjacent to the  right atrium, with no hemodynamic significance.  A left pleural effusion is present.    2021  Moderately (EF 35-40%) reduced left ventricular function is present. There is  thinning and akinesis of the mid septal segment. Inferior wall akinesis is  present. Apical wall akinesis is present.  Trivial pericardial effusion is present.     This study was compared with the study from 2021. No significant changes  noted.    Coronary angiogram:  2021    Conclusions  -Single vessel CAD with patent RCA stent and severe diffuse RCA vasospasm and mild LCA/LAD/LCx vasospasm. All resolved with administration of IC nitroglycerin.     Recommendations  -Additional 90 mg of PO ticagrelor given in CCL  -Can stop heparin  -Continuous IV nitroglycerin  -Stop BB  -Consider adding calcium channel blocker for coronary spasm cautiously given negative ionotropic effect especially with  nondihydropyridine CCBs  -Dual antiplatelet treatment with low-dose aspirin daily and 90 mg ticagrelor twice daily as scheduled for recently placed LIUDMILA and recent STEMI.  -Low-dose aspirin po daily lifelong.  -High intensity statin for CAD   -ACE inhibitor/ARB, and aldosterone blockade for CAD/STEMI ischemic cardiomyopathy   -Continued medical management including lifestyle modifications for CV risk factor optimizations

## 2021-04-28 NOTE — ANESTHESIA CARE TRANSFER NOTE
Patient: Maribel Yang    Procedure(s):  ECHOCARDIOGRAM, TRANSESOPHAGEAL, INTRAOPERATIVE    Diagnosis: Endocarditis [I38]  Diagnosis Additional Information: No value filed.    Anesthesia Type:   General     Note:      Level of Consciousness: awake  Oxygen Supplementation: face mask  Level of Supplemental Oxygen (L/min / FiO2): 6  Independent Airway: airway patency satisfactory and stable  Dentition: dentition unchanged  Vital Signs Stable: post-procedure vital signs reviewed and stable  Report to RN Given: handoff report given  Patient transferred to: PACU  Comments: Awake with good patent airway.  VSS  Handoff Report: Identifed the Patient, Identified the Reponsible Provider, Reviewed the pertinent medical history, Discussed the surgical course, Reviewed Intra-OP anesthesia mangement and issues during anesthesia, Set expectations for post-procedure period and Allowed opportunity for questions and acknowledgement of understanding      Vitals: (Last set prior to Anesthesia Care Transfer)  CRNA VITALS  4/28/2021 1029 - 4/28/2021 1111      4/28/2021             Pulse:  96    SpO2:  99 %        Electronically Signed By: SANDRA Munguia CRNA  April 28, 2021  11:11 AM

## 2021-04-28 NOTE — PROGRESS NOTES
"VASCULAR SURGERY PROGRESS NOTE    Subjective:  Transferred to 6B yesterday.  Unable to tolerate ROLY yesterday, will be completed in OR today at 10am.  Wound vacs replaced yesterday, intact.  Blood culture from 4/26 positive for gram positive cocci, subsequent cultures NTD.    Objective:    Intake/Output Summary (Last 24 hours) at 4/28/2021 0757  Last data filed at 4/28/2021 0400  Gross per 24 hour   Intake 727.5 ml   Output 1165 ml   Net -437.5 ml     Labs:  ROUTINE IP LABS (Last four results)  BMP  Recent Labs   Lab 04/28/21  0540 04/27/21  0204 04/26/21  1328 04/26/21  0459    138 138 140   POTASSIUM 3.8 3.8 4.1 4.4   CHLORIDE 112* 110* 111* 112*   KAYKAY 8.6 8.6 8.4* 8.4*   CO2 21 23 18* 18*   BUN 36* 42* 41* 38*   CR 1.46* 1.68* 1.58* 1.51*   GLC 87 110* 146* 138*     CBC  Recent Labs   Lab 04/28/21  0540 04/27/21  0204 04/26/21  1328 04/26/21  0459   WBC 13.3* 14.1* 15.1* 13.4*   RBC 2.80* 2.91* 3.07* 2.82*   HGB 7.8* 8.1* 8.5* 8.2*   HCT 25.6* 25.3* 26.5* 25.3*   MCV 91 87 86 90   MCH 27.9 27.8 27.7 29.1   MCHC 30.5* 32.0 32.1 32.4   RDW 18.3* 18.4* 17.6* 16.1*    324 391 434     INR  Recent Labs   Lab 04/25/21  2200   INR 1.18*     PHYSICAL EXAM:  BP (!) 150/87 (BP Location: Right arm)   Pulse 84   Temp 98.3  F (36.8  C) (Oral)   Resp 16   Ht 1.575 m (5' 2\")   Wt 48.2 kg (106 lb 4.2 oz)   SpO2 94%   BMI 19.44 kg/m    General: The patient is alert and oriented. Appropriate. Appears very deconditioned.  Psych: pleasant affect, answers questions appropriately  Skin: Color appropriate for race, warm, dry.  Respiratory: The patient does require 3L NC supplemental oxygen. Breathing unlabored  GI:  Abdomen soft, nontender to light palpation.  Extremities: Bilateral groin wound vacs with good suction, keri-wound skin without erythema.  Generalized old bruising in groin area.  DP/PT signals multiphasic bilaterally.        ASSESSMENT:  71 yo F s/p EVAR AUI and left to right femoral to femoral bypass " graft for 5.9 cm AAA. Initial post-op course was complicated by inferior STEMI requiring emergent cardiac catheterization and PCI to RCA. Patient presenting on 4/23/2021 to ED with subjective fevers and chills and erythema and drainage from right groin wound.      4/24/2021 - Incision and drainage of right groin wound; packing of the wound with Adaptic, moistened Kerlix gauze, ABD pad     4/25/2021 - Explantation of femoral to femoral bypass graft; femoral to femoral bypass graft using cadaveric homograft (tunneled retropubic); bilateral sartorius muscle flaps; placement of negative pressure wound vac therapy.      4/26/2021 - Cardiac catheterization for post-op EKG changes; RCA stent patent; diffuse vasospasm of LAD, LCx.  Returned to OR for bilateral groin washout and placement of Veraflo wound vac system.     4/27/2021: transfer out of ICU.         PLAN:  -ROLY today  -Repeated 2 site blood cultures today per ID recs  -Appreciate ID recs, will continue Vanc and Zosyn and hold off on PICC until BC negative for 48-72 hours.  Anticipating 6 weeks of cefazolin at discharge.  -Appreciate cardiology input, ROLY today  -Asprin, Brillinta, Atorvastatin, Metoprolol, Lisinopril.  Will discuss timing of resuming DOAC with Dr. Ferrara and discontinue ASA when transitioned.  -Continue wound vac to -125mmHg- vascular will change this Friday  -PT/OT  -Diet and optimize nutrition once ROLY completed.  -Will consult SW to begin discharge planning, patient will require TCU and is agreeable.  Discharge possibly towards the end of the week if PICC line able to be place and cultures remain negative.    Plan of care discussed with Dr. Ferrara, Vascular attending.    Jessica Bunn, DNP, APRN, AGNP-C  Division of Vascular Surgery   [facility]  Pager: [phone]

## 2021-04-28 NOTE — PLAN OF CARE
Neuro: A&Ox4.   Cardiac: SR. VSS.   Respiratory: Sating adequately on 3L NC at start of shift, weaned to 1L overnight..  GI/: Adequate urine output, multiple loose BM's throughout the night.   Diet/appetite: Pt NPO at midnight in anticipation of ROLY under sedation 4/28  Activity:  Assist of 2, repositioned throughout the night.  Pain: At acceptable level on current regimen.   Skin: No new deficits noted. Foam mepilex heart placed on coccyx per WOC order.   LDA's: PIVx1, NS running per MAR    Plan: Continue with POC. Notify primary team with changes. Pt slept off and on throughout the night.

## 2021-04-28 NOTE — PLAN OF CARE
Neuro: A/Ox4.   Cardiac: SR with HR 90's. VSS. Afebrile.    Respiratory: O2 sats stable on RA.  GI/: Adequate UOP via bedpan. Loose BM x2.   Diet/appetite: Regular diet.   Activity:  Ax 2 Turn and repo. PT consulted.   Pain: At acceptable level on current regimen.   Skin: Intact, no new deficits noted. Bruising to groin sites unchanged.   LDA's: Bilat groin wound vacs -125. MIV at 50mL/hr.     ROLY completed today in OR.     Plan: Continue with POC. Notify primary team with changes.

## 2021-04-28 NOTE — CONSULTS
Care Management Initial Consult    General Information  Assessment completed with: Patient,    Type of CM/SW Visit: CM Role Introduction    Primary Care Provider verified and updated as needed: Yes   Readmission within the last 30 days:   Yes               Communication Assessment  Patient's communication style: spoken language (English or Bilingual)    Hearing Difficulty or Deaf: no   Wear Glasses or Blind: no    Cognitive  Cognitive/Neuro/Behavioral: WDL  Level of Consciousness: alert  Arousal Level: opens eyes spontaneously  Orientation: oriented x 4  Mood/Behavior: calm, cooperative  Best Language: 0 - No aphasia  Speech: clear, spontaneous    Living Environment:   People in home: spouse  (, Padilla)  Current living Arrangements: house      Able to return to prior arrangements: TBD, [place]       Family/Social Support:  Care provided by:  , Daughter   Marital Status:              Description of Support System:    Involved and supportive       Current Resources:   Patient receiving home care services: [facility]  #[phone]  Skilled Home Care Services: Skilled Nursing, Physicial Therapy, Occupational Therapy  Community Resources:    Equipment currently used at home: none  Supplies currently used at home:      Employment/Financial       Financial Concerns: None identified            Socioeconomic History     Marital status:      Spouse name: Padilla Yang     Number of children: 4     Years of education: 14-15     Highest education level: Not on file   Occupational History     Occupation:      Employer: NONE      Employer: [employer]     Tobacco Use     Smoking status: Former Smoker     Packs/day: 0.30     Years: 35.00     Pack years: 10.50     Types: Cigarettes     Start date: 1/1/1967     Smokeless tobacco: Never Used     Tobacco comment: Couple times a week. 1-2 cigs 1-2x a week.  "  Substance and Sexual Activity     Alcohol use: Not Currently     Alcohol/week: 0.0 standard drinks     Frequency: Monthly or less     Drinks per session: 1 or 2     Binge frequency: Less than monthly     Comment: rarely     Drug use: Not Currently     Types: Marijuana     Sexual activity: Not Currently     Partners: Male     Birth control/protection: Abstinence     Comment: 25 years of marriage       Functional Status:  Prior to admission patient needed assistance: Housekeeping, meals, ADLS               Values/Beliefs:  Spiritual, Cultural Beliefs, Confucianism Practices, Values that affect care:                 Additional Information:  Pt with complex medical history including ESRD, LDKT, lung cancer, AAA occluded iliac artery s/p femorofemoral bypass 4/6/21, STEMI readmitted on 4/23 with endovascular infection to ICU.  Pt was transferred out of ICU yesterday to 6B. I met with Pt to introduce myself and care coordinator role. Pt confirms Home Care was following prior to admission but admits she is weaker and \" [place].\"  Pt pleased with Nursing care on 6B, currently has no outstanding questions.    Dyana Brady RN   6B care coordinator #[phone]        "

## 2021-04-28 NOTE — ANESTHESIA PROCEDURE NOTES
Airway       Patient location during procedure: OR  Staff -        CRNA: Janna Salvador APRN CRNA       Performed By: CRNA  Consent for Airway        Urgency: elective  Indications and Patient Condition       Indications for airway management: keri-procedural       Induction type:intravenous       Mask difficulty assessment: 1 - vent by mask    Final Airway Details       Final airway type: endotracheal airway       Successful airway: ETT - single and Oral  Endotracheal Airway Details        ETT size (mm): 6.5       Cuffed: yes       Successful intubation technique: direct laryngoscopy       DL Blade Type: Hernandez 2       Adjucts: stylet       Position: Right       Measured from: gums/teeth       Secured at (cm): 22       Bite block used: Soft    Post intubation assessment        Placement verified by: capnometry, equal breath sounds and chest rise        Number of attempts at approach: 1       Secured with: pink tape       Ease of procedure: easy       Dentition: Intact and Unchanged    Medication(s) Administered   Medication Administration Time: 4/28/2021 10:24 AM

## 2021-04-28 NOTE — PROGRESS NOTES
SOLID ORGAN TRANSPLANT INFECTIOUS DISEASES PROGRESS NOTE     Patient:  Maribel Yang   YOB: 1950  Date of Visit:  04/28/2021  Date of Admission: 4/23/2021  Consult Requester:Abena Ferrara MD          Assessment and Recommendations:   Recommendations:   - Check 2 sets of BC on 4/29  - Continue cefazolin 2g IV q12h for MSSA treatment. Anticipate minimum of 6 weeks from graft removal and clearance of bacteremia (first negative blood culture).  - Hold off on PICC placement until BC are negative at least 48-72 hours (too soon to place PICC on 4/29)    Thank you for the consult. Transplant ID will continue to follow with you.     Delia Villalobos PA-C   Pronouns: she/her/hers  Infectious Diseases  Pager: [phone]  04/28/2021    Assessment:  Maribel Yang is a 70 year old female with PMH significant for ESRD s/p LDKT 9/20/2004 (previously on sirolimus transitioned 4/2/21 to MMF keri-operatively; prednisone), lung cancer s/p SBRT (2014), anal canal carcinoma s/p chemoradiation (6/2018), and 5.9cm AAA and occluded right external iliac artery s/p EVAR with femorofemoral bypass (4/6/21) c/b inferior STEMI s/p LIUDMILA to proximal RCA who was admitted on 4/23 due to endovascular infection at site of previous surgery.      She is now s/p right groin I&D on 4/24 as well as explantation of the femorofemoral bypass graft, femoral to femoral bypass graft using cadaveric homograft, bilateral sartorius muscle flap and wound vac placement on 4/25. BC, I&D culture 4/24, and cultures from 4/25 aregrowing Staph aureus. Sensitivities show MSSA. At this point we can transition antibiotics to MSSA directed therapy. Due to endovascular infection, we anticipate a prolonged course of antibiotics of 6 weeks. Discussed with Vascular Surgery, all artificial graft material was removed from fem-fem site, but she still retains aortic graft. Presence of artificial material means she may need suppressive antibiotics  following completion of IV therapy. This can be determined during follow-up.     As she had positive BC from 4/23 and now 4/26, hold off on PICC placement for now as we need to ensure blood has cleared prior to placing line. Although bacteremia persistent, this was a day after source control achieved and prior to her being switched to targeted MSSA therapy. Given that organism grown on culture is Staph aureus, ROLY was done 4/28 and was negative for vegetations. She does not have back pain or joint pain currently, so low suspicion for other metastatic sites of infection. She does not have any artificial joints, but does have a metal piece in her toe. Her toe is not more tender than usual, so low suspicion for infectious involvement.     Previous ID Issues:  - none known     Other ID issues:  - QTc interval:  477 msec on 4/23/21   - Pneumocystis prophylaxis:  none  - Viral serostatus: unknown  - Viral prophylaxis:  none  - Fungal prophylaxis:  none  - Immunosuppression: mycophenolic acid, prednisone  - Immunization status:  completed Moderna COVID-19 vaccine 2/23/21  - Gamma globulin status:  1,100 on 3/1/21  - Isolation status: standard precautions         Interval History:   Afebrile. Stable O2 requirement of 4L. BC from 4/26 both with GPCs in clusters. No growth on 4/27 or 4/28 BC thus far. Planning to reattempt ROLY today in OR. She is seen just after returning from ROLY. She thinks she is starting to develop a headache. She denies SOB. No back pain, joint pain, or rashes. Slightly bilateral pedal edema, improved from earlier today per RN.    Antimicrobials:  Current  - cefazolin 4/27-current     Past  - Ceftriaxone 4/23  - Pip-tazo 4/24-4/27  - Vancomycin 4/23-4/27         History of Present Illness:   Adopted from initial consult note:    Transplants:  9/20/2004 (Kidney), Postoperative day:  6064     Maribellc Yang is a 70 year old female with PMH significant for ESRD s/p LDKT 9/20/2004 (previously on  sirolimus transitioned 4/2/21 to MMF keri-operatively; prednisone), lung cancer s/p SBRT (2014), anal canal carcinoma s/p chemoradiation (6/2018), and 5.9cm AAA and occluded right external iliac artery s/p EVAR with femorofemoral bypass (4/6/21) c/b inferior STEMI s/p LIUDMILA to proximal RCA who presented to the ED on 4/23 due to pain and swelling in right groin at site of prior procedure and fever.     She previously underwent endovascular abdominal aortic aneurysm repair with aorto uniiliac endovascular repair and femorofemoral bypass as well as plugging of the right common iliac artery to prevent endoleak on 4/6/2021. Post-op course was complicated by inferior STEMI for which she was taken emergently to the cath lab for PCI with LIUDMILA of the proximal RCA. She was able to be discharged to home on 4/10. She was initially doing well at home, although did experience some low back pain. During virtual follow-up visit with vascular surgery on 4/19 it was reported that her incision was healing well and without redness, swelling or drainage. However, the following day she called regarding increased redness, pain, and warmth in the right groin near incision. She was prescribed Keflex on 4/22, however, symptoms worsened and she developed fever up to 103.6 at home which prompted her to report to ED.     In the ED she was noted to be afebrile with mild leukocytosis of 13.4. BC were taken and on Day 2 are growing MSSA in 1 of 2 cultures. She underwent right groin I&D on 4/24 and purulent fluid was seen surrounding right groin Artegraft and bypass graft. Culture was taken which is now growing Staph aureus. She then underwent explantation of the femorofemoral bypass graft, femoral to femoral bypass graft using cadaveric homograft, bilateral sartorius muscle flap and wound vac placement on 4/25. Cultures were collected and are currently pending. Following procedure she developed diffuse coronary vasospasm with complete occlusion of  RCA that was reopened with nitroglycerin. She was transferred to SICU. She returned to OR 4/26 with no significant expressible purulence seen. She underwent debridement of some of the muscle of the left sartorius muscle flap with wound vac reapplied.            Review of Systems:   Targeted 5 point review of systems was negative except for noted as above the interval history section.          Current Medications:      aspirin  81 mg Oral Daily    atorvastatin  80 mg Oral Daily    ceFAZolin  2 g Intravenous Q12H    fentaNYL  75 mcg Intravenous Once    fluticasone  2 spray Both Nostrils Daily    isosorbide mononitrate  60 mg Oral Daily    [Held by provider] lisinopril  5 mg Oral Daily    melatonin  6 mg Oral At Bedtime    mycophenolic acid  360 mg Oral BID    predniSONE  5 mg Oral Daily    sodium chloride (PF)  10 mL Intravenous Once    sodium chloride (PF)  3 mL Intravenous Q8H    sodium chloride (PF)  3 mL Intracatheter Q8H    ticagrelor  90 mg Oral BID              Physical Exam:   Vitals were reviewed  Temp:  [97.4  F (36.3  C)-99.3  F (37.4  C)] 98.5  F (36.9  C)  Pulse:  [77-99] 92  Resp:  [14-18] 14  BP: (113-152)/(68-89) 121/75  FiO2 (%):  [1 %-3 %] 1 %  SpO2:  [91 %-99 %] 93 %    Vitals:    04/23/21 1918 04/23/21 1928 04/26/21 0500 04/28/21 0500   Weight: 46.2 kg (101 lb 14.4 oz) 46.2 kg (101 lb 14.4 oz) 47.8 kg (105 lb 6.1 oz) 48.2 kg (106 lb 4.2 oz)       Constitutional: Adult female seen lying in bed, in NAD. Awake, alert, interactive.  HEENT: NC/AT, EOMI, sclera clear, conjunctiva normal, nasal cannula in place, OP with MMM  Respiratory: No increased work of breathing, CTAB, no crackles or wheezing.  Cardiovascular: RRR, no murmur noted. No peripheral edema.  GI: Normal bowel sounds, soft, non-distended and non-tender.  Skin: Warm, dry, well-perfused. Scattered bruising noted on upper extremities. Wound vac in place in b/l groin  Musculoskeletal: Extremities grossly normal, non-tender.   Neurologic: A&O.  Answers questions appropriately, speech normal. Moves all extremities spontaneously.  Neuropsychiatric: Calm. Affect appropriate to situation.  Vascular access:  PIV on LUE CDI, non-tender, no surrounding erythema.           Laboratory Data:   Microbiology:  Culture Micro   Date Value Ref Range Status   04/28/2021 No growth after 1 hour  Preliminary   04/28/2021 No growth after 1 hour  Preliminary   04/27/2021 No growth after 13 hours  Preliminary   04/27/2021 No growth after 1 day  Preliminary   04/26/2021 (A)  Preliminary    Cultured on the 1st day of incubation:  Staphylococcus aureus  Susceptibility testing done on previous specimen     04/26/2021   Preliminary    Critical Value/Significant Value, preliminary result only, called to and read back by   Yary Morrow RN @1923 4.27.21 LM.     04/26/2021 (A)  Preliminary    Cultured on the 1st day of incubation:  Staphylococcus aureus  Susceptibility testing done on previous specimen     04/26/2021   Preliminary    Critical Value/Significant Value, preliminary result only, called to and read back by  Yary Morrow RN @1818 4.27.21      04/25/2021 Culture negative monitoring continues  Preliminary   04/25/2021 Culture negative after 2 days  Preliminary   04/25/2021 Light growth  Staphylococcus aureus   (A)  Preliminary       Metabolic Studies       Recent Labs   Lab Test 04/28/21  0540 04/27/21  0204 04/27/21  0204 04/26/21  1328 04/25/21 2011 04/25/21 2011 04/08/21  0506 04/08/21  0506     --  138 138   < > 137   < > 141   POTASSIUM 3.8  --  3.8 4.1   < > 4.7   < > 3.6   CHLORIDE 112*  --  110* 111*   < >  --    < > 110*   CO2 21  --  23 18*   < >  --    < > 24   ANIONGAP 5  --  5 10   < >  --    < > 8   BUN 36*  --  42* 41*   < >  --    < > 24   CR 1.46*  --  1.68* 1.58*   < >  --    < > 1.79*   GFRESTIMATED 37*  --  31* 33*   < >  --    < > 29*   GLC 87  --  110* 146*   < > 132*   < > 117*   A1C  --   --   --   --   --   --   --  5.6   KAYKAY 8.6  --  8.6 8.4*    < >  --    < > 8.4*   PHOS 2.8  --  3.4 4.5   < >  --   --   --    MAG 2.7*   < > 3.2* 1.9   < >  --   --   --    LACT  --   --   --   --   --  0.9   < >  --     < > = values in this interval not displayed.       Hepatic Studies    Recent Labs   Lab Test 21  1935 19  1044 19  1044   BILITOTAL 0.5   < > 0.4   ALKPHOS 169*   < > 120   ALBUMIN 2.0*   < > 3.4   AST 16   < > 16   ALT 15   < > 20   LDH  --   --  164    < > = values in this interval not displayed.       Pancreatitis testing    Recent Labs   Lab Test 21  0506   TRIG 162*       Hematology Studies      Recent Labs   Lab Test 21  0540 21  0204 21  1328 21  1935 21  1935 21  0520 21  0506 21  0506   WBC 13.3* 14.1* 15.1*   < > 13.4* 10.3   < > 11.9*   ANEU  --   --   --   --  11.8* 8.8*  --  10.3*   ALYM  --   --   --   --  0.2* 0.4*  --  0.4*   SAUNDRA  --   --   --   --  1.2 0.9  --  1.1   AEOS  --   --   --   --  0.0 0.1  --  0.0   HGB 7.8* 8.1* 8.5*   < > 9.1* 9.0*   < > 7.5*   HCT 25.6* 25.3* 26.5*   < > 29.5* 27.5*   < > 23.1*    324 391   < > 529* 97*   < > 105*    < > = values in this interval not displayed.       Arterial Blood Gas Testing    Recent Labs   Lab Test 21   PH 7.30*   PCO2 43   PO2 110*   HCO3 21   O2PER 0.44             Imaging:     Recent Results (from the past 48 hour(s))   Transesophageal Echocardiogram    [facility]    [id number]  [id number]  [id number]  [id number]^STANGENES^SANDRA^CONCEPCION     [facility]  Echocardiography Laboratory  [address] [place] [id number]     Name: ERASMO MERNIO  MRN: [id number]  : 1950  Study Date: 2021 10:23 AM  Age: 70 yrs  Gender: Female  Patient Location: [facility]  Reason For Study: 2nd degree AV Block  Ordering Physician: SANDRA PEÑA  Performed By: Otto Wise     BSA: 1.5 m2  Height: 62 in  Weight: 106 lb  HR: 99  BP: 120/80 mmHg  Attestation  I was  present during ROLY probe placement by the fellow, Otto Wise. I  personally viewed the imaging and agree with the interpretation and report as  documented by the fellow.  ______________________________________________________________________________  Interpretation Summary  No evidence of endocarditis.  ______________________________________________________________________________  Procedure  Transesophageal Echocardiogram with color and spectral Doppler performed. 3D  image acquisition, reconstruction, and real-time interpretation was performed.  I was present during ROLY probe placement by the Fellow. I personally viewed  the imaging and agree with the interpretation and report as documented by the  Fellow. Procedure location Operating Room. Informed consent for  Transesophegeal echo obtained. ROLY Probe #[id number] was used during the procedure.  Sedation was administered and monitored by anesthesia. Sedation, endotracheal  intubation, and mechanical ventilation were initiated prior to the ROLY and  were monitored by anesthesia. The Transducer was inserted without difficulty .  The patient tolerated the procedure well. Complications None. The patient's  rhythm is normal sinus. Good quality two-dimensional was performed and  interpreted. Good quality color and spectral Doppler were performed and  interpreted.     Left Ventricle  Moderately (EF 35-40%) reduced left ventricular function is present. There is  thinning and akinesis of the mid septal segment. Inferior wall akinesis is  present. Apical wall akinesis is present. Left ventricular size is normal.     Right Ventricle  Right ventricular function, chamber size, wall motion, and thickness are  normal.     Atria  The left atrial appendage is normal. It is free of spontaneous echo contrast  and thrombus. The left atrial appendage Doppler velocities are normal. Both  atria appear normal. The atrial septum is intact as assessed by color  Doppler .     Mitral Valve  The  mitral valve is normal. Mitral leaflet thickness is normal. Trace mitral  insufficiency is present.     Aortic Valve  The aortic valve is tricuspid. Lambl's excrescence is present. Trace aortic  insufficiency is present.     Tricuspid Valve  The tricuspid valve is normal. Trace tricuspid insufficiency is present.     Pulmonic Valve  The pulmonic valve is normal.     Vessels  The aorta root is normal. The thoracic aorta is normal. 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     Pericardium  Trivial pericardial effusion is present.     Compared to Previous Study  This study was compared with the study from 4/26/2021 . There has been no  change.     ______________________________________________________________________________  Report approved by: MD Kash Tomas 04/28/2021 12:04 PM     ______________________________________________________________________________

## 2021-04-28 NOTE — ANESTHESIA PREPROCEDURE EVALUATION
Anesthesia Pre-Procedure Evaluation    Patient: Maribel Yang   MRN: [id number] : 1950        Preoperative Diagnosis: Endocarditis [I38]   Procedure : Procedure(s):  ECHOCARDIOGRAM, TRANSESOPHAGEAL, INTRAOPERATIVE     Past Medical History:   Diagnosis Date     Abnormal coagulation profile     p [id number] heterozygote      Age-related osteoporosis without current pathological fracture 2019     Anemia      Antiplatelet or antithrombotic long-term use      ASCUS with positive high risk HPV 2015    + HPV 56, 54,& 6, colp - TAL III, Leep =TAL II     Basal cell carcinoma      Depressive disorder 2015     Hypertension      Immunosuppressed status (H)     due meds     Kidney replaced by transplant     Living donor recipient,  Rejection 2005     LSIL (low grade squamous intraepithelial lesion) on Pap smear 2013    +HPV 33 or 45, 61       PAD (peripheral artery disease) (H)      PONV (postoperative nausea and vomiting)      Squamous cell lung cancer (H)      Thrombosis of leg 1967     Unspecified disorder of kidney and ureter     X-linked dominant Alport's syndrome.      Past Surgical History:   Procedure Laterality Date     BIOPSY      Kidney, Lung, Breast     BIOPSY ANAL N/A 3/14/2018    Procedure: BIOPSY ANAL;;  Surgeon: Shabbir Leo MD;  [facility]     BYPASS GRAFT FEMORAL FEMORAL Bilateral 2021    Procedure: Axplantation infected graft, femoral to femoral bypass with cadaveric artery, bilateral SATORIUS MUSCLE FLAP CREATION, evacuation of absece, vacuum closure;  Surgeon: Raven Lewis MD;  Location:  OR      TRANSPLANTATION OF KIDNEY      recipient -- done at [facility]     COLONOSCOPY       COLONOSCOPY N/A 2017    Procedure: COMBINED COLONOSCOPY, SINGLE OR MULTIPLE BIOPSY/POLYPECTOMY BY BIOPSY;;  Surgeon: Sushil Hyatt MD;  Location:  GI     COLPOSCOPY,LOOP ELECTRD CERVIX EXCIS  08    TAL II     CONIZATION LEEP  2013    Procedure:  CONIZATION LEEP;;  Surgeon: Liliana Renteria MD;  [facility]     CONIZATION LEEP N/A 8/17/2016    Procedure: CONIZATION LEEP;  Surgeon: Liliana Renteria MD;  [facility]     CV CORONARY ANGIOGRAM N/A 4/6/2021    Procedure: CV CORONARY ANGIOGRAM;  Surgeon: Sincere Rosas MD;  [facility]     CV CORONARY ANGIOGRAM N/A 4/25/2021    Procedure: Coronary Angiogram;  Surgeon: Sincere Rosas MD;  [facility]     CV PCI STENT DRUG ELUTING N/A 4/6/2021    Procedure: Percutaneous Coronary Intervention Stent Drug Eluting;  Surgeon: Sincere Rosas MD;  [facility]     ENDOVASCULAR REPAIR ANEURYSM AORTOILIAC Bilateral 4/6/2021    Procedure: Endovascular Abdominal Aortic Aneurysm Repair with Aortouniiliac Device and Femoral-Femoral Artery Bypass with [id number] Artegraft;  Surgeon: Abena Ferrara MD;  [facility]     EXAM UNDER ANESTHESIA ANUS  7/15/2014    Procedure: EXAM UNDER ANESTHESIA ANUS;  Surgeon: Radha Musa MD;  [facility]     EXAM UNDER ANESTHESIA ANUS N/A 3/14/2018    Procedure: EXAM UNDER ANESTHESIA ANUS;  Anal Exam Under Anesthesia With Excision of anal lesion, proctoscopy;  Surgeon: Shabbir Leo MD;  Location: [facility]     EYE SURGERY       GENITOURINARY SURGERY       IR OR ANGIOGRAM  4/6/2021     IRRIGATION AND DEBRIDEMENT GROIN Right 4/24/2021    Procedure: IRRIGATION AND DEBRIDEMENT, INGUINAL REGION, I & D PF RIGHT GROIN;  Surgeon: Raven Lewis MD;  [facility]     IRRIGATION AND DEBRIDEMENT GROIN N/A 4/26/2021    Procedure: IRRIGATION AND DEBRIDEMENT, INGUINAL REGION, PLACEMENT OF VERAFLO WOUND VAC, WOUND WASHOUT,;  Surgeon: Raven Lewis MD;  [facility]     LASER CO2 EXCISE VULVA WIDE LOCAL  7/15/2014    Procedure: LASER CO2 EXCISE VULVA WIDE LOCAL;  Surgeon: Liliana Renteria MD;  [facility]     LASER CO2 VAGINA  7/17/2013    Procedure:  LASER CO2 VAGINA;;  Surgeon: Liliana Renteria MD;  [facility]     LASER CO2 VAGINA N/A 9/25/2018    Procedure: LASER CO2 VAGINA;  Exam Under Anesthesia, CO2 Laser Ablation of Upper Vagina and Cervix;  Surgeon: Pati Garcia MD;  Location: [facility]     MICROSCOPY ANAL  7/17/2013    Procedure: MICROSCOPY ANAL;  Anal Microscopy,  EUA vagina,Colposcopy Of Vagina And Vulva, Vaginal Biopsies, Omniguide Co2 Laser To Vagina and vulva, Loop Electrosurgical Excision Procedure To Cervix;  Surgeon: Radha Musa MD;  [facility]     MICROSCOPY ANAL  7/15/2014    Procedure: MICROSCOPY ANAL;  Surgeon: Radha Musa MD;  [facility]     VASCULAR SURGERY      Thrombectomy     ZZC NONSPECIFIC PROCEDURE      Thrombectomy     ZZC NONSPECIFIC PROCEDURE  1955 and 1959    Bilater eye surgery - correction for crossed eyes     ZZC NONSPECIFIC PROCEDURE  1998    oopherectomy L     ZZC NONSPECIFIC PROCEDURE  1967    open kidney biopsy - L      Allergies   Allergen Reactions     Blood Transfusion Related (Informational Only) Other (See Comments)     Patient has a history of a clinically significant antibody against RBC antigens.  A delay in compatible RBCs may occur.     Ultracet Nausea and Vomiting and Hives     Hydrocodone Nausea and Vomiting and Hives      Social History     Tobacco Use     Smoking status: Former Smoker     Packs/day: 0.30     Years: 35.00     Pack years: 10.50     Types: Cigarettes     Start date: 1/1/1967     Smokeless tobacco: Never Used     Tobacco comment: Couple times a week. 1-2 cigs 1-2x a week.   Substance Use Topics     Alcohol use: Not Currently     Alcohol/week: 0.0 standard drinks     Frequency: Monthly or less     Drinks per session: 1 or 2     Binge frequency: Less than monthly     Comment: rarely      Wt Readings from Last 1 Encounters:   04/28/21 48.2 kg (106 lb 4.2 oz)        Anesthesia Evaluation   Pt has had prior anesthetic. Type: General and MAC.    No history  of anesthetic complications       ROS/MED HX  ENT/Pulmonary:     (+) tobacco use, Past use,     Neurologic:       Cardiovascular:     (+) hypertension-Peripheral Vascular Disease (Abdominal aortic aneurysm (AAA) without rupture )-- Other. --stent-Drug Eluting Stent. Taking blood thinners     METS/Exercise Tolerance:     Hematologic:     (+) History of blood clots, pt is anticoagulated,     Musculoskeletal:       GI/Hepatic:       Renal/Genitourinary:     (+) renal disease, Pt has history of transplant,     Endo:       Psychiatric/Substance Use:       Infectious Disease:       Malignancy:   (+) Malignancy, History of Other, Skin and GI.Other CA YUNIOR III (vulvar intraepithelial neoplasia status post.    Other:            Physical Exam    Airway        Mallampati: II   TM distance: > 3 FB   Neck ROM: full   Mouth opening: > 3 cm    Respiratory Devices and Support         Dental           Cardiovascular   cardiovascular exam normal       Rhythm and rate: regular and normal     Pulmonary   pulmonary exam normal        breath sounds clear to auscultation           OUTSIDE LABS:  CBC:   Lab Results   Component Value Date    WBC 13.3 (H) 04/28/2021    WBC 14.1 (H) 04/27/2021    HGB 7.8 (L) 04/28/2021    HGB 8.1 (L) 04/27/2021    HCT 25.6 (L) 04/28/2021    HCT 25.3 (L) 04/27/2021     04/28/2021     04/27/2021     BMP:   Lab Results   Component Value Date     04/28/2021     04/27/2021    POTASSIUM 3.8 04/28/2021    POTASSIUM 3.8 04/27/2021    CHLORIDE 112 (H) 04/28/2021    CHLORIDE 110 (H) 04/27/2021    CO2 21 04/28/2021    CO2 23 04/27/2021    BUN 36 (H) 04/28/2021    BUN 42 (H) 04/27/2021    CR 1.46 (H) 04/28/2021    CR 1.68 (H) 04/27/2021    GLC 87 04/28/2021     (H) 04/27/2021     COAGS:   Lab Results   Component Value Date    PTT 26 04/25/2021    INR 1.18 (H) 04/25/2021     POC:   Lab Results   Component Value Date     (H) 04/26/2021    HCG Negative 09/22/2008     HEPATIC:   Lab  Results   Component Value Date    ALBUMIN 2.0 (L) 04/23/2021    PROTTOTAL 6.1 (L) 04/23/2021    ALT 15 04/23/2021    AST 16 04/23/2021    ALKPHOS 169 (H) 04/23/2021    BILITOTAL 0.5 04/23/2021    BILIDIRECT .0@ 03/10/2005     OTHER:   Lab Results   Component Value Date    PH 7.30 (L) 04/25/2021    LACT 0.9 04/25/2021    A1C 5.6 04/08/2021    KAYKAY 8.6 04/28/2021    PHOS 2.8 04/28/2021    MAG 2.7 (H) 04/28/2021    LIPASE 56 10/23/2009    TSH 1.97 04/08/2021    SED 16 06/12/2019       Anesthesia Plan    ASA Status:  4      Anesthesia Type: General.     - Airway: ETT   Induction: Intravenous, Propofol.   Maintenance: Balanced.   Techniques and Equipment:     - Airway: Video-Laryngoscope     - Lines/Monitors: 2nd IV     Consents    Anesthesia Plan(s) and associated risks, benefits, and realistic alternatives discussed. Questions answered and patient/representative(s) expressed understanding.     - Discussed with:  Patient      - Extended Intubation/Ventilatory Support Discussed: Yes.      - Patient is DNR/DNI Status: No    Use of blood products discussed: No .     Postoperative Care       PONV prophylaxis: Ondansetron (or other 5HT-3), Dexamethasone or Solumedrol     Comments:                Jluis Fernandes MD

## 2021-04-29 ENCOUNTER — APPOINTMENT (OUTPATIENT)
Dept: PHYSICAL THERAPY | Facility: CLINIC | Age: 71
DRG: 252 | End: 2021-04-29
Payer: COMMERCIAL

## 2021-04-29 ENCOUNTER — APPOINTMENT (OUTPATIENT)
Dept: OCCUPATIONAL THERAPY | Facility: CLINIC | Age: 71
DRG: 252 | End: 2021-04-29
Payer: COMMERCIAL

## 2021-04-29 LAB
ANION GAP SERPL CALCULATED.3IONS-SCNC: 7 MMOL/L (ref 3–14)
BACTERIA SPEC CULT: ABNORMAL
BACTERIA SPEC CULT: NO GROWTH
BUN SERPL-MCNC: 36 MG/DL (ref 7–30)
CALCIUM SERPL-MCNC: 8.2 MG/DL (ref 8.5–10.1)
CHLORIDE SERPL-SCNC: 111 MMOL/L (ref 94–109)
CO2 SERPL-SCNC: 23 MMOL/L (ref 20–32)
CREAT SERPL-MCNC: 1.35 MG/DL (ref 0.52–1.04)
EBV DNA # SPEC NAA+PROBE: 3065 {COPIES}/ML
EBV DNA SPEC NAA+PROBE-LOG#: 3.5 {LOG_COPIES}/ML
ERYTHROCYTE [DISTWIDTH] IN BLOOD BY AUTOMATED COUNT: 18.1 % (ref 10–15)
GFR SERPL CREATININE-BSD FRML MDRD: 40 ML/MIN/{1.73_M2}
GLUCOSE SERPL-MCNC: 89 MG/DL (ref 70–99)
HCT VFR BLD AUTO: 24.9 % (ref 35–47)
HGB BLD-MCNC: 7.5 G/DL (ref 11.7–15.7)
MCH RBC QN AUTO: 27.5 PG (ref 26.5–33)
MCHC RBC AUTO-ENTMCNC: 30.1 G/DL (ref 31.5–36.5)
MCV RBC AUTO: 91 FL (ref 78–100)
PLATELET # BLD AUTO: 264 10E9/L (ref 150–450)
POTASSIUM SERPL-SCNC: 3.8 MMOL/L (ref 3.4–5.3)
RBC # BLD AUTO: 2.73 10E12/L (ref 3.8–5.2)
SODIUM SERPL-SCNC: 141 MMOL/L (ref 133–144)
SPECIMEN SOURCE: ABNORMAL
SPECIMEN SOURCE: NORMAL
WBC # BLD AUTO: 14 10E9/L (ref 4–11)

## 2021-04-29 PROCEDURE — 250N000013 HC RX MED GY IP 250 OP 250 PS 637: Performed by: ANESTHESIOLOGY

## 2021-04-29 PROCEDURE — 258N000003 HC RX IP 258 OP 636: Performed by: SURGERY

## 2021-04-29 PROCEDURE — 80048 BASIC METABOLIC PNL TOTAL CA: CPT | Performed by: STUDENT IN AN ORGANIZED HEALTH CARE EDUCATION/TRAINING PROGRAM

## 2021-04-29 PROCEDURE — 97530 THERAPEUTIC ACTIVITIES: CPT | Mod: GP

## 2021-04-29 PROCEDURE — 99233 SBSQ HOSP IP/OBS HIGH 50: CPT | Performed by: STUDENT IN AN ORGANIZED HEALTH CARE EDUCATION/TRAINING PROGRAM

## 2021-04-29 PROCEDURE — 36415 COLL VENOUS BLD VENIPUNCTURE: CPT | Performed by: STUDENT IN AN ORGANIZED HEALTH CARE EDUCATION/TRAINING PROGRAM

## 2021-04-29 PROCEDURE — 250N000012 HC RX MED GY IP 250 OP 636 PS 637: Performed by: SURGERY

## 2021-04-29 PROCEDURE — 99024 POSTOP FOLLOW-UP VISIT: CPT | Performed by: NURSE PRACTITIONER

## 2021-04-29 PROCEDURE — 99232 SBSQ HOSP IP/OBS MODERATE 35: CPT | Mod: GC | Performed by: INTERNAL MEDICINE

## 2021-04-29 PROCEDURE — 250N000013 HC RX MED GY IP 250 OP 250 PS 637: Performed by: DIETITIAN, REGISTERED

## 2021-04-29 PROCEDURE — 36415 COLL VENOUS BLD VENIPUNCTURE: CPT | Performed by: NURSE PRACTITIONER

## 2021-04-29 PROCEDURE — 97535 SELF CARE MNGMENT TRAINING: CPT | Mod: GO

## 2021-04-29 PROCEDURE — 97165 OT EVAL LOW COMPLEX 30 MIN: CPT | Mod: GO

## 2021-04-29 PROCEDURE — 85027 COMPLETE CBC AUTOMATED: CPT | Performed by: STUDENT IN AN ORGANIZED HEALTH CARE EDUCATION/TRAINING PROGRAM

## 2021-04-29 PROCEDURE — 250N000011 HC RX IP 250 OP 636: Performed by: NURSE PRACTITIONER

## 2021-04-29 PROCEDURE — 250N000013 HC RX MED GY IP 250 OP 250 PS 637: Performed by: SURGERY

## 2021-04-29 PROCEDURE — 120N000003 HC R&B IMCU UMMC

## 2021-04-29 PROCEDURE — 250N000013 HC RX MED GY IP 250 OP 250 PS 637: Performed by: STUDENT IN AN ORGANIZED HEALTH CARE EDUCATION/TRAINING PROGRAM

## 2021-04-29 PROCEDURE — 87040 BLOOD CULTURE FOR BACTERIA: CPT | Performed by: NURSE PRACTITIONER

## 2021-04-29 RX ORDER — LISINOPRIL 5 MG/1
5 TABLET ORAL DAILY
Status: DISCONTINUED | OUTPATIENT
Start: 2021-04-29 | End: 2021-04-29

## 2021-04-29 RX ADMIN — MYCOPHENOLIC ACID 360 MG: 360 TABLET, DELAYED RELEASE ORAL at 08:58

## 2021-04-29 RX ADMIN — CEFAZOLIN SODIUM 2 G: 2 INJECTION, SOLUTION INTRAVENOUS at 20:32

## 2021-04-29 RX ADMIN — ISOSORBIDE MONONITRATE 60 MG: 60 TABLET, EXTENDED RELEASE ORAL at 08:59

## 2021-04-29 RX ADMIN — MYCOPHENOLIC ACID 360 MG: 360 TABLET, DELAYED RELEASE ORAL at 20:33

## 2021-04-29 RX ADMIN — LISINOPRIL 5 MG: 5 TABLET ORAL at 09:03

## 2021-04-29 RX ADMIN — FLUTICASONE PROPIONATE 2 SPRAY: 50 SPRAY, METERED NASAL at 08:58

## 2021-04-29 RX ADMIN — ACETAMINOPHEN 650 MG: 325 TABLET, FILM COATED ORAL at 01:26

## 2021-04-29 RX ADMIN — SODIUM CHLORIDE: 9 INJECTION, SOLUTION INTRAVENOUS at 18:07

## 2021-04-29 RX ADMIN — TICAGRELOR 90 MG: 90 TABLET ORAL at 20:33

## 2021-04-29 RX ADMIN — CEFAZOLIN SODIUM 2 G: 2 INJECTION, SOLUTION INTRAVENOUS at 09:03

## 2021-04-29 RX ADMIN — MELATONIN TAB 3 MG 6 MG: 3 TAB at 21:58

## 2021-04-29 RX ADMIN — ASPIRIN 81 MG CHEWABLE TABLET 81 MG: 81 TABLET CHEWABLE at 08:59

## 2021-04-29 RX ADMIN — PREDNISONE 5 MG: 5 TABLET ORAL at 08:59

## 2021-04-29 RX ADMIN — ATORVASTATIN CALCIUM 80 MG: 40 TABLET, FILM COATED ORAL at 08:58

## 2021-04-29 RX ADMIN — TICAGRELOR 90 MG: 90 TABLET ORAL at 08:59

## 2021-04-29 ASSESSMENT — MIFFLIN-ST. JEOR: SCORE: 968.25

## 2021-04-29 ASSESSMENT — ACTIVITIES OF DAILY LIVING (ADL)
ADLS_ACUITY_SCORE: 18
ADLS_ACUITY_SCORE: 19
ADLS_ACUITY_SCORE: 18

## 2021-04-29 NOTE — PROGRESS NOTES
04/29/21 0900   Quick Adds   Type of Visit Initial Occupational Therapy Evaluation   Living Environment   People in home spouse   Current Living Arrangements house   Home Accessibility stairs to enter home;stairs within home   Number of Stairs, Main Entrance 2   Stair Railings, Main Entrance none   Number of Stairs, Within Home, Primary other (see comments)   Stair Railings, Within Home, Primary railing on left side (ascending)  (14 to upstairs, 14 tp basement.)   Transportation Anticipated car, drives self;family or friend will provide   Self-Care   Usual Activity Tolerance good   Current Activity Tolerance fair   Regular Exercise No   Equipment Currently Used at Home none   Activity/Exercise/Self-Care Comment Pt reports being independent at baseline. Pt has a walker and a cane available if needed.   Disability/Function   Hearing Difficulty or Deaf no   Wear Glasses or Blind yes   Vision Management reading glasses   Concentrating, Remembering or Making Decisions Difficulty no   Difficulty Communicating no   Difficulty Eating/Swallowing no   Walking or Climbing Stairs Difficulty no   Dressing/Bathing Difficulty no   Toileting issues no   Doing Errands Independently Difficulty (such as shopping) yes   Errands Management  assists   Fall history within last six months no   Change in Functional Status Since Onset of Current Illness/Injury yes   General Information   Onset of Illness/Injury or Date of Surgery 04/23/21   Referring Physician Devendra Salgado MD   Patient/Family Therapy Goal Statement (OT) Pt would like to get out of the hospital.    Additional Occupational Profile Info/Pertinent History of Current Problem 69 yo F s/p EVAR AUI and left to right femoral to femoral bypass graft for 5.9 cm AAA. Initial post-op course was complicated by inferior STEMI requiring emergent cardiac catheterization and PCI to RCA. Patient presenting on 4/23/2021 to ED with subjective fevers and chills and erythema and  drainage from right groin wound.    Existing Precautions/Restrictions fall   Left Upper Extremity (Weight-bearing Status) full weight-bearing (FWB)   Right Upper Extremity (Weight-bearing Status) full weight-bearing (FWB)   Left Lower Extremity (Weight-bearing Status) full weight-bearing (FWB)   Right Lower Extremity (Weight-bearing Status) full weight-bearing (FWB)   Cognitive Status Examination   Orientation Status orientation to person, place and time   Affect/Mental Status (Cognitive) WFL   Visual Perception   Visual Impairment/Limitations corrective lenses for reading   Sensory   Sensory Quick Adds No deficits were identified   Pain Assessment   Patient Currently in Pain No   Integumentary/Edema   Integumentary/Edema no deficits were identifed   Range of Motion Comprehensive   Comment, General Range of Motion BUE AROM WFL   Bed Mobility   Comment (Bed Mobility) Mod A and vc's.    Transfers   Transfer Comments Min A x 2 and Max vc's.    Clinical Impression   Criteria for Skilled Therapeutic Interventions Met (OT) yes;meets criteria;skilled treatment is necessary   OT Diagnosis Decreased inedependence with functional transfers and ADLs/IADLS.    OT Problem List-Impairments impacting ADL problems related to;activity tolerance impaired;balance;fear & anxiety;flexibility;mobility;range of motion (ROM);strength;pain;post-surgical precautions;postural control   Assessment of Occupational Performance 3-5 Performance Deficits   Identified Performance Deficits Decreased independence with functional transfers and ADLS.    Planned Therapy Interventions (OT) ADL retraining;IADL retraining;cognition;ROM;strengthening;stretching;transfer training;home program guidelines;progressive activity/exercise   Clinical Decision Making Complexity (OT) low complexity   Therapy Frequency (OT) 5x/week   Predicted Duration of Therapy 5/13/2021   Risk & Benefits of therapy have been explained evaluation/treatment results reviewed;care  plan/treatment goals reviewed;patient   OT Discharge Planning    OT Discharge Recommendation (DC Rec) Transitional Care Facility   OT Rationale for DC Rec Pt will benefit from continued therapy to maximize functional independence with ADLS/IADLs, strength and endurance.    Total Evaluation Time (Minutes)   Total Evaluation Time (Minutes) 10

## 2021-04-29 NOTE — PROGRESS NOTES
Neuro: A&Ox4.   Cardiac: SR with inverted t waves. VSS.  Respiratory: Sating >93% on RA.  GI/: Adequate urine output. BM X4- soft/diarrhea  Diet/appetite: Tolerating reg diet. Eating well.  Activity:  Assist of 2, up to commode   Pain: At acceptable level on current regimen. denies  Skin: No new deficits noted. blanchable redness to coccyx with mepilex in placer  LDA's: wound vac y- to two sites on groin, 2x PIV    PICC placement once negative Blood cultures for 48-72 hours.   ABX X 6 WEEKS    Plan: Continue with POC. Notify primary team with changes.

## 2021-04-29 NOTE — PROGRESS NOTES
[facility] TRANSPLANT INFECTIOUS DISEASES PROGRESS NOTE     Patient:  Maribel Yang   YOB: 1950  Date of Visit:  04/29/2021  Date of Admission: 4/23/2021  Consult Requester:Abena Ferrara MD          Assessment and Recommendations:   Recommendations:   - Continue cefazolin 2g IV q12h for MSSA treatment. Anticipate minimum of 6 weeks from clearance of bacteremia (first negative blood culture).  - Will likely need long term antibiotic suppression following completion of cefazolin.  - If 4/27 BC remains negative tomorrow (4/30), ok to place PICC line.  - Anticipate adding rifampin as soon as tomorrow as long as BC remain negative given high grade endovascular infection with large artificial graft that will not be removed.     Thank you for the consult. Transplant ID will continue to follow with you.     Delia Villalobos PA-C   Pronouns: she/her/hers  Infectious Diseases  Pager: [phone]  04/29/2021    Assessment:  Maribel Yang is a 70 year old female with PMH significant for ESRD s/p LDKT 9/20/2004, lung cancer s/p SBRT (2014), anal canal carcinoma s/p chemoradiation (6/2018), and 5.9cm AAA and occluded right external iliac artery s/p EVAR with femorofemoral bypass (4/6/21) c/b inferior STEMI s/p LIUDMILA to proximal RCA who was admitted on 4/23 due to endovascular infection at site of previous surgery.      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 BC, I&D culture 4/24, and cultures from 4/25 are growing Staph aureus. Sensitivities show MSSA. Initially on Zosyn and Vancomycin, then transitioned to cefazolin on 4/27 for targeted MSSA therapy. BC from 4/23 and 4/26 with MSSA. No growth on 4/27,  4/28 or 4/29 BC so far. Although bacteremia persistent, this was a day after source control achieved and prior to her being switched to targeted MSSA therapy. ROLY was done 4/28 and was negative for vegetations. She does not have back pain or joint pain currently, so low suspicion for other metastatic sites of infection. She does not have any artificial joints, but does have a metal piece in her toe. Her toe is not more tender than usual, so low suspicion for infectious involvement.    Due to endovascular infection, we anticipate a minimum of 6 weeks of antibiotics. Discussed with Vascular Surgery, all artificial graft material was removed from fem-fem site, but she still retains aortic graft. Presence of artificial material means she will likely need suppressive antibiotics following completion of IV therapy. This can be determined during follow-up.     # LDKT 9/20/2004  Previously on sirolimus but transitioned 4/2/21 to MMF keri-operatively with ongoing prednisone.     Previous ID Issues:  - none known     Other ID issues:  - QTc interval:  477 msec on 4/23/21   - Pneumocystis prophylaxis:  none  - Viral serostatus: unknown  - Viral prophylaxis:  none  - Fungal prophylaxis:  none  - Immunosuppression: mycophenolic acid, prednisone  - Immunization status:  completed Moderna COVID-19 vaccine 2/23/21  - Gamma globulin status:  1,100 on 3/1/21  - Isolation status: standard precautions         Interval History:   Afebrile. Weaned off of supplemental O2, sating mid 90s on room air. No growth on 4/27 or 4/28 BC thus far. ROLY negative for vegetations. Maribel reports she is doing well. No SOB. No abdominal pain, n/d/d. No joint pain or back pain.    Antimicrobials:  Current  - cefazolin 4/27-current     Past  - Ceftriaxone 4/23  - Pip-tazo 4/24-4/27  - Vancomycin 4/23-4/27         History of Present Illness:   Adopted from initial consult note:    Transplants:  9/20/2004 (Kidney), Postoperative day:  6065     Maribel Perkins  Trey is a 70 year old female with PMH significant for ESRD s/p LDKT 9/20/2004 (previously on sirolimus transitioned 4/2/21 to MMF keri-operatively; prednisone), lung cancer s/p SBRT (2014), anal canal carcinoma s/p chemoradiation (6/2018), and 5.9cm AAA and occluded right external iliac artery s/p EVAR with femorofemoral bypass (4/6/21) c/b inferior STEMI s/p LIUDMILA to proximal RCA who presented to the ED on 4/23 due to pain and swelling in right groin at site of prior procedure and fever.     She previously underwent endovascular abdominal aortic aneurysm repair with aorto uniiliac endovascular repair and femorofemoral bypass as well as plugging of the right common iliac artery to prevent endoleak on 4/6/2021. Post-op course was complicated by inferior STEMI for which she was taken emergently to the cath lab for PCI with LIUDMILA of the proximal RCA. She was able to be discharged to home on 4/10. She was initially doing well at home, although did experience some low back pain. During virtual follow-up visit with vascular surgery on 4/19 it was reported that her incision was healing well and without redness, swelling or drainage. However, the following day she called regarding increased redness, pain, and warmth in the right groin near incision. She was prescribed Keflex on 4/22, however, symptoms worsened and she developed fever up to 103.6 at home which prompted her to report to ED.     In the ED she was noted to be afebrile with mild leukocytosis of 13.4. BC were taken and on Day 2 are growing MSSA in 1 of 2 cultures. She underwent right groin I&D on 4/24 and purulent fluid was seen surrounding right groin Artegraft and bypass graft. Culture was taken which is now growing Staph aureus. She then underwent explantation of the femorofemoral bypass graft, femoral to femoral bypass graft using cadaveric homograft, bilateral sartorius muscle flap and wound vac placement on 4/25. Cultures were collected and are currently  pending. Following procedure she developed diffuse coronary vasospasm with complete occlusion of RCA that was reopened with nitroglycerin. She was transferred to SICU. She returned to OR 4/26 with no significant expressible purulence seen. She underwent debridement of some of the muscle of the left sartorius muscle flap with wound vac reapplied.            Review of Systems:   Targeted 5 point review of systems was negative except for noted as above the interval history section.          Current Medications:       aspirin  81 mg Oral Daily     atorvastatin  80 mg Oral Daily     ceFAZolin  2 g Intravenous Q12H     fentaNYL  75 mcg Intravenous Once     fluticasone  2 spray Both Nostrils Daily     isosorbide mononitrate  60 mg Oral Daily     lisinopril  5 mg Oral Daily     melatonin  6 mg Oral At Bedtime     mycophenolic acid  360 mg Oral BID     predniSONE  5 mg Oral Daily     sodium chloride (PF)  10 mL Intravenous Once     sodium chloride (PF)  3 mL Intravenous Q8H     sodium chloride (PF)  3 mL Intracatheter Q8H     ticagrelor  90 mg Oral BID              Physical Exam:   Vitals were reviewed  Temp:  [96.8  F (36  C)-99.3  F (37.4  C)] 98.1  F (36.7  C)  Pulse:  [] 74  Resp:  [14-18] 16  BP: (113-164)/(68-89) 158/85  SpO2:  [91 %-97 %] 96 %    Vitals:    04/23/21 1918 04/23/21 1928 04/26/21 0500 04/28/21 0500   Weight: 46.2 kg (101 lb 14.4 oz) 46.2 kg (101 lb 14.4 oz) 47.8 kg (105 lb 6.1 oz) 48.2 kg (106 lb 4.2 oz)    04/29/21 0129   Weight: 48.5 kg (106 lb 14.8 oz)       Constitutional: Adult female seen lying in bed, in NAD. Awake, alert, interactive.  HEENT: NC/AT, EOMI, sclera clear, conjunctiva normal, nasal cannula in place, OP with MMM  Respiratory: No increased work of breathing, CTAB, no crackles or wheezing.  Cardiovascular: RRR, no murmur noted. No peripheral edema.  GI: Normal bowel sounds, soft, non-distended and non-tender.  Skin: Warm, dry, well-perfused. Scattered bruising noted on upper  extremities. Wound vac in place in b/l groin (not directly assessed today)  Musculoskeletal: Extremities grossly normal, non-tender.   Neurologic: A&O. Answers questions appropriately, speech normal. Moves all extremities spontaneously.  Neuropsychiatric: Calm. Affect appropriate to situation.  Vascular access:  PIV on LUE CDI, non-tender, no surrounding erythema.           Laboratory Data:   Microbiology:  Culture Micro   Date Value Ref Range Status   04/28/2021 No growth after 23 hours  Preliminary   04/28/2021 No growth after 23 hours  Preliminary   04/27/2021 No growth after 2 days  Preliminary   04/27/2021 No growth after 2 days  Preliminary   04/26/2021 (A)  Final    Cultured on the 1st day of incubation:  Staphylococcus aureus  Susceptibility testing done on previous specimen     04/26/2021   Final    Critical Value/Significant Value, preliminary result only, called to and read back by   Yary Morrow RN @1923 4.27.21 LM.     04/26/2021 (A)  Final    Cultured on the 1st day of incubation:  Staphylococcus aureus  Susceptibility testing done on previous specimen     04/26/2021   Final    Critical Value/Significant Value, preliminary result only, called to and read back by  Yary Morrow RN @1818 4.27.21      04/25/2021 Culture negative monitoring continues  Preliminary   04/25/2021 Culture negative after 2 days  Preliminary   04/25/2021 Light growth  Staphylococcus aureus   (A)  Preliminary       Metabolic Studies       Recent Labs   Lab Test 04/29/21  0519 04/28/21  0540 04/27/21  0204 04/27/21  0204 04/25/21 2011 04/25/21 2011 04/08/21  0506 04/08/21  0506    138  --  138   < > 137   < > 141   POTASSIUM 3.8 3.8  --  3.8   < > 4.7   < > 3.6   CHLORIDE 111* 112*  --  110*   < >  --    < > 110*   CO2 23 21  --  23   < >  --    < > 24   ANIONGAP 7 5  --  5   < >  --    < > 8   BUN 36* 36*  --  42*   < >  --    < > 24   CR 1.35* 1.46*  --  1.68*   < >  --    < > 1.79*   GFRESTIMATED 40* 37*  --  31*   < >   --    < > 29*   GLC 89 87  --  110*   < > 132*   < > 117*   A1C  --   --   --   --   --   --   --  5.6   KAYKAY 8.2* 8.6  --  8.6   < >  --    < > 8.4*   PHOS  --  2.8  --  3.4   < >  --   --   --    MAG  --  2.7*   < > 3.2*   < >  --   --   --    LACT  --   --   --   --   --  0.9   < >  --     < > = values in this interval not displayed.       Hepatic Studies    Recent Labs   Lab Test 21  1935 19  1044 19  1044   BILITOTAL 0.5   < > 0.4   ALKPHOS 169*   < > 120   ALBUMIN 2.0*   < > 3.4   AST 16   < > 16   ALT 15   < > 20   LDH  --   --  164    < > = values in this interval not displayed.       Pancreatitis testing    Recent Labs   Lab Test 21  0506   TRIG 162*       Hematology Studies      Recent Labs   Lab Test 21  0519 21  0540 21  0204 21  1935 21  1935 21  0520 21  0506 21  0506   WBC 14.0* 13.3* 14.1*   < > 13.4* 10.3   < > 11.9*   ANEU  --   --   --   --  11.8* 8.8*  --  10.3*   ALYM  --   --   --   --  0.2* 0.4*  --  0.4*   ASUNDRA  --   --   --   --  1.2 0.9  --  1.1   AEOS  --   --   --   --  0.0 0.1  --  0.0   HGB 7.5* 7.8* 8.1*   < > 9.1* 9.0*   < > 7.5*   HCT 24.9* 25.6* 25.3*   < > 29.5* 27.5*   < > 23.1*    292 324   < > 529* 97*   < > 105*    < > = values in this interval not displayed.       Arterial Blood Gas Testing    Recent Labs   Lab Test 21   PH 7.30*   PCO2 43   PO2 110*   HCO3 21   O2PER 0.44             Imaging:     Recent Results (from the past 48 hour(s))   Transesophageal Echocardiogram    Narrative    [id number]  [id number]  [id number]  [id number]^MARIANA^SANDRA^CONCEPCION     [facility],[place]  Echocardiography Laboratory  [address] [place] [id number]     Name: ERASMO MERINO  MRN: [id number]  : 1950  Study Date: 2021 10:23 AM  Age: 70 yrs  Gender: Female  Patient Location: [facility]  Reason For Study: 2nd degree AV Block  Ordering Physician: MARIANA  SANDRA JAVIER  Performed By: Otto Wise     BSA: 1.5 m2  Height: 62 in  Weight: 106 lb  HR: 99  BP: 120/80 mmHg  Attestation  I was present during ROLY probe placement by the fellow, Otto Wise. I  personally viewed the imaging and agree with the interpretation and report as  documented by the fellow.  ______________________________________________________________________________  Interpretation Summary  No evidence of endocarditis.  ______________________________________________________________________________  Procedure  Transesophageal Echocardiogram with color and spectral Doppler performed. 3D  image acquisition, reconstruction, and real-time interpretation was performed.  I was present during ROLY probe placement by the Fellow. I personally viewed  the imaging and agree with the interpretation and report as documented by the  Fellow. Procedure location Operating Room. Informed consent for  Transesophegeal echo obtained. ROLY Probe #[id number] was used during the procedure.  Sedation was administered and monitored by anesthesia. Sedation, endotracheal  intubation, and mechanical ventilation were initiated prior to the ROLY and  were monitored by anesthesia. The Transducer was inserted without difficulty .  The patient tolerated the procedure well. Complications None. The patient's  rhythm is normal sinus. Good quality two-dimensional was performed and  interpreted. Good quality color and spectral Doppler were performed and  interpreted.     Left Ventricle  Moderately (EF 35-40%) reduced left ventricular function is present. There is  thinning and akinesis of the mid septal segment. Inferior wall akinesis is  present. Apical wall akinesis is present. Left ventricular size is normal.     Right Ventricle  Right ventricular function, chamber size, wall motion, and thickness are  normal.     Atria  The left atrial appendage is normal. It is free of spontaneous echo contrast  and thrombus. The left atrial appendage Doppler  velocities are normal. Both  atria appear normal. The atrial septum is intact as assessed by color  Doppler .     Mitral Valve  The mitral valve is normal. Mitral leaflet thickness is normal. Trace mitral  insufficiency is present.     Aortic Valve  The aortic valve is tricuspid. Lambl's excrescence is present. Trace aortic  insufficiency is present.     Tricuspid Valve  The tricuspid valve is normal. Trace tricuspid insufficiency is present.     Pulmonic Valve  The pulmonic valve is normal.     Vessels  The aorta root is normal. The thoracic aorta is normal. 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     Pericardium  Trivial pericardial effusion is present.     Compared to Previous Study  This study was compared with the study from 4/26/2021 . There has been no  change.     ______________________________________________________________________________  Report approved by: MD Kash Tomas 04/28/2021 12:04 PM     ______________________________________________________________________________

## 2021-04-29 NOTE — PROGRESS NOTES
[facility]  Transplant Nephrology Progress Note  Date of Admission:  4/23/2021  Today's Date: 04/29/2021  Requesting physician: Raven Lewis MD    Recommendations:  - continue MPA and prednisone 5 mg.  - Renal graft function is stable, will continue to follow.     Assessment & Plan   # LDKT: Stable   - Baseline Creatinine: ~ 1.3-1.6   - Proteinuria: Not checked recently   - Date DSA Last Checked: Apr/2018      Latest DSA: No   - BK Viremia: Not checked recently due to time from transplant   - Kidney Tx Biopsy: No    # Immunosuppression: Mycophenolic acid (dose 360 mg every 12 hours) and Prednisone (dose 5 mg daily)   - Changes: No    # Infection Prophylaxis:   - PJP: None    # Hypertension: Controlled;  Goal BP: < 130/80   - Volume status: Euvolemic     - Changes: No    # Anemia in Chronic Renal Disease: Hgb: Stable      JONATHAN: No   - Iron studies: Not checked recently    # Mineral Bone Disorder:   - Vitamin D; level: Not checked recently        On supplement: No  - Calcium; level: Normal        On supplement: No    # Electrolytes:   - Potassium; level: Normal        On supplement: No  - Bicarbonate; level: Normal        On supplement: No    # infrarenal AAA, s/p EVAR with fem-fem bypass on 4/6 c/b infected R femoral graft s/p  Debridement. management per surgery.    # MSSA bacteremia 2/2 groin abscess and endovascular fem-fem bypass graft infection now s/p I&D, washout and new graft placement. ID following.     # Transplant History:  Etiology of Kidney Failure: Alport's syndrome  Tx: LDKT  Transplant: 9/20/2004 (Kidney)  Significant changes in immunosuppression: None  Significant transplant-related complications: Recurrent Skin Cancers    Recommendations were communicated to the primary team via this note.    Seen and discussed with Dr. Pili Del Angel MD  Pager: [phone]    This patient has been seen and evaluated by me, Flaca Alejandre MD.  I have  "reviewed the note and agree with plan of care as documented by the fellow.    Flaca Alejandre MD     Interval:  No overnight events, ROLY negative for vegetation. Stable renal function. She mouna any active complaints.     Review of Systems    5 point Review of Systems is negative other than noted in the interval or here.    Physical Exam   Temp  Av.6  F (37  C)  Min: 97.2  F (36.2  C)  Max: 100.7  F (38.2  C)  Arterial Line BP  Min: 49/43  Max: 172/90  Arterial Line MAP (mmHg)  Av.1 mmHg  Min: 48 mmHg  Max: 131 mmHg      Pulse  Av.6  Min: 50  Max: 95 Resp  Av.6  Min: 10  Max: 40  SpO2  Av.2 %  Min: 88 %  Max: 100 %     BP (!) 140/78 (BP Location: Right arm, Cuff Size: Adult Small)   Pulse 85   Temp 97.7  F (36.5  C) (Oral)   Resp 16   Ht 1.575 m (5' 2\")   Wt 48.5 kg (106 lb 14.8 oz)   SpO2 94%   BMI 19.56 kg/m     Date 21 07 - 21 0659   Shift 8075-6010 3000-7364 1043-9114 24 Hour Total   INTAKE   P.O. 100   100   I.V. 227.8   227.8   Blood Components 300   300   Shift Total(mL/kg) 627.8(13.13)   627.8(13.13)   OUTPUT   Urine 420   420   Blood 20   20   Shift Total(mL/kg) 440(9.21)   440(9.21)   Weight (kg) 47.8 47.8 47.8 47.8      Admit Weight: 46.2 kg (101 lb 14.4 oz)     GENERAL APPEARANCE: alert and no distress  RESP: lungs clear to auscultation - no rales, rhonchi or wheezes  CV: regular rhythm, normal rate, no rub, no murmur  EDEMA: no LE edema bilaterally  ABDOMEN: soft, ND  MS: extremities normal - no gross deformities noted, no evidence of inflammation in joints, no muscle tenderness  SKIN: no rash    Data   CMP  Recent Labs   Lab 21  0519 21  0540 21  0830 21  0204 21  1328 21  0459 21  1935 21    138  --  138 138 140   < > 132*   POTASSIUM 3.8 3.8  --  3.8 4.1 4.4   < > 4.4   CHLORIDE 111* 112*  --  110* 111* 112*   < > 102   CO2 23 21  --  23 18* 18*   < > 21   ANIONGAP 7 5  --  5 10 11   < > 9   GLC " 89 87  --  110* 146* 138*   < > 112*   BUN 36* 36*  --  42* 41* 38*   < > 36*   CR 1.35* 1.46*  --  1.68* 1.58* 1.51*   < > 1.63*   GFRESTIMATED 40* 37*  --  31* 33* 35*   < > 32*   GFRESTBLACK 47* 42*  --  36* 38* 41*   < > 37*   KAYKAY 8.2* 8.6  --  8.6 8.4* 8.4*   < > 9.2   MAG  --  2.7* 2.8* 3.2* 1.9 2.0   < >  --    PHOS  --  2.8  --  3.4 4.5 4.6*   < >  --    PROTTOTAL  --   --   --   --   --   --   --  6.1*   ALBUMIN  --   --   --   --   --   --   --  2.0*   BILITOTAL  --   --   --   --   --   --   --  0.5   ALKPHOS  --   --   --   --   --   --   --  169*   AST  --   --   --   --   --   --   --  16   ALT  --   --   --   --   --   --   --  15    < > = values in this interval not displayed.     CBC  Recent Labs   Lab 04/29/21  0519 04/28/21  0540 04/27/21  0204 04/26/21  1328   HGB 7.5* 7.8* 8.1* 8.5*   WBC 14.0* 13.3* 14.1* 15.1*   RBC 2.73* 2.80* 2.91* 3.07*   HCT 24.9* 25.6* 25.3* 26.5*   MCV 91 91 87 86   MCH 27.5 27.9 27.8 27.7   MCHC 30.1* 30.5* 32.0 32.1   RDW 18.1* 18.3* 18.4* 17.6*    292 324 391     INR  Recent Labs   Lab 04/25/21  2200   INR 1.18*   PTT 26     ABG  Recent Labs   Lab 04/25/21 2011 04/25/21  1912 04/24/21  1313   PH 7.30* 7.32* 7.46*   PCO2 43 39 29*   PO2 110* 124* 58*   HCO3 21 20* 21   O2PER 0.44 43.0 30.0      Urine Studies  Recent Labs   Lab Test 01/20/14  0841   COLOR Light Yellow   APPEARANCE Clear   URINEGLC Negative   URINEBILI Negative   URINEKETONE Negative   SG 1.013   UBLD Negative   URINEPH 5.0   PROTEIN Negative   NITRITE Negative   LEUKEST Moderate*   RBCU 1   WBCU 2     Recent Labs   Lab Test 04/24/18  1812 01/31/17  1030 06/03/16  0900 10/22/15  1024 06/25/15  1007 01/14/15  0902 04/07/14  0921   UTPG 1.04* 1.67* 0.76* 0.44* 0.31* 0.27* 0.05     PTH  Recent Labs   Lab Test 06/12/19  1810   PTHI 89*     Iron Studies  No lab results found.    IMAGING:  All imaging studies reviewed by me.

## 2021-04-29 NOTE — PROGRESS NOTES
Care Management Follow Up    Length of Stay (days): 6    Expected Discharge Date: 05/01/21     Concerns to be Addressed: discharge planning     Patient plan of care discussed at interdisciplinary rounds: Yes    Anticipated Discharge Disposition: Transitional Care  Anticipated Discharge Services: None  Anticipated Discharge DME: None    Patient/family educated on Medicare website which has current facility and service quality ratings: no  -Not at this time - Pt and  specifically requesting referral to [facility]  Education Provided on the Discharge Plan: yes  Patient/Family in Agreement with the Plan: yes    Referrals Placed by CM/SW: Post Acute Facilities  Private pay costs discussed: Not applicable    Additional Information:  SALVADOR coordinated with DOMINIC Lopez with Vascular Surgery today re: POC. Per Jessica, PICC to be placed tomorrow and Pt will likely be ready for discharge on Saturday. Pt will discharge with two wound vacs and IV abx.     SALVADOR received VM from Pt's  (Padilla) wanting to discuss discharge planning. SW returned call and spoke with Padilla this AM. He reiterates that he and Pt would like her placed at  TCU at discharge. He is very hopeful she will be able to transition there this weekend. SW assured him a referral has been made and SW will keep him updated re: discharge plan.     SW updated [facility] Liaison (Angeles) re: potential discharge date and discharge needs. SW awaiting update if [facility] can accept.     SW to continue to follow and assist with discharge plan as appropriate.     EVITA Castro, LICSW  6B Intermediate Care Unit   [facility]  Phone: [phone]  Pager: [phone]

## 2021-04-29 NOTE — PLAN OF CARE
Neuro: A&Ox4.   Cardiac: SR. VSS.   Respiratory: Sating adequately on RA.  GI/: Adequate urine output.  Diet/appetite: Tolerating regular diet.  Activity:  Assist of 2, repositioned frequently throughout the night.  Pain: At acceptable level on current regimen.   Skin: No new deficits noted.  LDA's: Wound vac to bilateral groin sites, PIVx2 with NS at 50 per MAR.     Plan: Continue with POC. Notify primary team with changes.

## 2021-04-29 NOTE — PROGRESS NOTES
"VASCULAR SURGERY PROGRESS NOTE    Subjective:  Patient found resting in bed, appears comfortable.  Denies pain at present.  ROLY completed yesterday, no evidence of endocarditis. Blood cultures from 4/27 and 4/28 remain NTD.    Objective:    Intake/Output Summary (Last 24 hours) at 4/29/2021 0720  Last data filed at 4/29/2021 0400  Gross per 24 hour   Intake 1743 ml   Output 200 ml   Net 1543 ml     Labs:  ROUTINE IP LABS (Last four results)  BMP  Recent Labs   Lab 04/29/21  0519 04/28/21  0540 04/27/21  0204 04/26/21  1328    138 138 138   POTASSIUM 3.8 3.8 3.8 4.1   CHLORIDE 111* 112* 110* 111*   KAYKAY 8.2* 8.6 8.6 8.4*   CO2 23 21 23 18*   BUN 36* 36* 42* 41*   CR 1.35* 1.46* 1.68* 1.58*   GLC 89 87 110* 146*     CBC  Recent Labs   Lab 04/29/21  0519 04/28/21  0540 04/27/21  0204 04/26/21  1328   WBC 14.0* 13.3* 14.1* 15.1*   RBC 2.73* 2.80* 2.91* 3.07*   HGB 7.5* 7.8* 8.1* 8.5*   HCT 24.9* 25.6* 25.3* 26.5*   MCV 91 91 87 86   MCH 27.5 27.9 27.8 27.7   MCHC 30.1* 30.5* 32.0 32.1   RDW 18.1* 18.3* 18.4* 17.6*    292 324 391     INR  Recent Labs   Lab 04/25/21  2200   INR 1.18*     PHYSICAL EXAM:  BP (!) 143/87 (BP Location: Right arm)   Pulse 83   Temp 98.3  F (36.8  C) (Oral)   Resp 18   Ht 1.575 m (5' 2\")   Wt 48.5 kg (106 lb 14.8 oz)   SpO2 95%   BMI 19.56 kg/m    General: The patient is alert and oriented. Appears pale and malnourished.  Psych: pleasant affect, answers questions appropriately  Skin: Color appropriate for race, warm, dry.  Respiratory: The patient does not require supplemental oxygen. Breathing unlabored  GI:  Abdomen soft, nontender to light palpation.  Extremities:Bilateral groin wound vacs with good suction, keri-wound skin without erythema.  Generalized old bruising in groin area.  DP/PT signals multiphasic bilaterally, feet warm and pink.      ASSESSMENT:  69 yo F s/p EVAR AUI and left to right femoral to femoral bypass graft for 5.9 cm AAA. Initial post-op course was " complicated by inferior STEMI requiring emergent cardiac catheterization and PCI to RCA. Patient presenting on 4/23/2021 to ED with subjective fevers and chills and erythema and drainage from right groin wound.      4/24/2021 - Incision and drainage of right groin wound; packing of the wound with Adaptic, moistened Kerlix gauze, ABD pad     4/25/2021 - Explantation of femoral to femoral bypass graft; femoral to femoral bypass graft using cadaveric homograft (tunneled retropubic); bilateral sartorius muscle flaps; placement of negative pressure wound vac therapy.      4/26/2021 - Cardiac catheterization for post-op EKG changes; RCA stent patent; diffuse vasospasm of LAD, LCx.  Returned to OR for bilateral groin washout and placement of Veraflo wound vac system.     4/27/2021: transfer out of ICU.         PLAN:  -Continue cefazolin 2g Q12 hours.  -Appreciate ID assistance, repeat blood cultures today, will discuss PICC placement tomorrow.  -Asprin, Brillinta, Atorvastatin.  Will discuss timing of resuming DOAC with Dr. Ferrara and discontinue ASA when transitioned.  -Appreciate cardiology assistance, Imdur 60mg every day, [id number] resumed, holding metoprolol  -Continue wound vac to -125mmHg- vascular will change tomorrow  -PT/OT  -Diet and optimize nutrition, protein shakes  -Appreciate SW/RNCC assistance, discharge to TCU once PICC line placed.      Plan of care discussed with Dr. Ferrara, Vascular attending.      Jessica Bunn, DNP, APRN, AGNP-C  Division of Vascular Surgery   [facility]  Pager: [phone]

## 2021-04-30 ENCOUNTER — APPOINTMENT (OUTPATIENT)
Dept: GENERAL RADIOLOGY | Facility: CLINIC | Age: 71
DRG: 252 | End: 2021-04-30
Attending: NURSE PRACTITIONER
Payer: COMMERCIAL

## 2021-04-30 ENCOUNTER — HOME INFUSION (PRE-WILLOW HOME INFUSION) (OUTPATIENT)
Dept: PHARMACY | Facility: CLINIC | Age: 71
End: 2021-04-30

## 2021-04-30 LAB
ANION GAP SERPL CALCULATED.3IONS-SCNC: 8 MMOL/L (ref 3–14)
BUN SERPL-MCNC: 35 MG/DL (ref 7–30)
CALCIUM SERPL-MCNC: 8 MG/DL (ref 8.5–10.1)
CHLORIDE SERPL-SCNC: 112 MMOL/L (ref 94–109)
CO2 SERPL-SCNC: 20 MMOL/L (ref 20–32)
CREAT SERPL-MCNC: 1.2 MG/DL (ref 0.52–1.04)
ERYTHROCYTE [DISTWIDTH] IN BLOOD BY AUTOMATED COUNT: 18.6 % (ref 10–15)
GFR SERPL CREATININE-BSD FRML MDRD: 46 ML/MIN/{1.73_M2}
GLUCOSE SERPL-MCNC: 79 MG/DL (ref 70–99)
HCT VFR BLD AUTO: 26.4 % (ref 35–47)
HGB BLD-MCNC: 8.1 G/DL (ref 11.7–15.7)
LABORATORY COMMENT REPORT: NORMAL
MCH RBC QN AUTO: 28.3 PG (ref 26.5–33)
MCHC RBC AUTO-ENTMCNC: 30.7 G/DL (ref 31.5–36.5)
MCV RBC AUTO: 92 FL (ref 78–100)
PLATELET # BLD AUTO: 297 10E9/L (ref 150–450)
POTASSIUM SERPL-SCNC: 3.5 MMOL/L (ref 3.4–5.3)
RBC # BLD AUTO: 2.86 10E12/L (ref 3.8–5.2)
SARS-COV-2 RNA RESP QL NAA+PROBE: NEGATIVE
SODIUM SERPL-SCNC: 140 MMOL/L (ref 133–144)
SPECIMEN SOURCE: NORMAL
WBC # BLD AUTO: 14.1 10E9/L (ref 4–11)

## 2021-04-30 PROCEDURE — 250N000013 HC RX MED GY IP 250 OP 250 PS 637: Performed by: SURGERY

## 2021-04-30 PROCEDURE — 99233 SBSQ HOSP IP/OBS HIGH 50: CPT | Mod: GC | Performed by: INTERNAL MEDICINE

## 2021-04-30 PROCEDURE — 36569 INSJ PICC 5 YR+ W/O IMAGING: CPT

## 2021-04-30 PROCEDURE — 250N000009 HC RX 250: Performed by: NURSE PRACTITIONER

## 2021-04-30 PROCEDURE — 120N000003 HC R&B IMCU UMMC

## 2021-04-30 PROCEDURE — 250N000013 HC RX MED GY IP 250 OP 250 PS 637: Performed by: ANESTHESIOLOGY

## 2021-04-30 PROCEDURE — 250N000013 HC RX MED GY IP 250 OP 250 PS 637: Performed by: STUDENT IN AN ORGANIZED HEALTH CARE EDUCATION/TRAINING PROGRAM

## 2021-04-30 PROCEDURE — U0005 INFEC AGEN DETEC AMPLI PROBE: HCPCS | Performed by: NURSE PRACTITIONER

## 2021-04-30 PROCEDURE — 71045 X-RAY EXAM CHEST 1 VIEW: CPT | Mod: 26 | Performed by: RADIOLOGY

## 2021-04-30 PROCEDURE — 999N000065 XR CHEST PORT 1 VIEW

## 2021-04-30 PROCEDURE — 272N000201 ZZ HC ADHESIVE SKIN CLOSURE, DERMABOND

## 2021-04-30 PROCEDURE — 80048 BASIC METABOLIC PNL TOTAL CA: CPT | Performed by: STUDENT IN AN ORGANIZED HEALTH CARE EDUCATION/TRAINING PROGRAM

## 2021-04-30 PROCEDURE — 99232 SBSQ HOSP IP/OBS MODERATE 35: CPT | Mod: GC | Performed by: INTERNAL MEDICINE

## 2021-04-30 PROCEDURE — 99233 SBSQ HOSP IP/OBS HIGH 50: CPT | Performed by: STUDENT IN AN ORGANIZED HEALTH CARE EDUCATION/TRAINING PROGRAM

## 2021-04-30 PROCEDURE — 250N000011 HC RX IP 250 OP 636: Performed by: NURSE PRACTITIONER

## 2021-04-30 PROCEDURE — 85027 COMPLETE CBC AUTOMATED: CPT | Performed by: STUDENT IN AN ORGANIZED HEALTH CARE EDUCATION/TRAINING PROGRAM

## 2021-04-30 PROCEDURE — 250N000012 HC RX MED GY IP 250 OP 636 PS 637: Performed by: SURGERY

## 2021-04-30 PROCEDURE — 250N000011 HC RX IP 250 OP 636: Performed by: SURGERY

## 2021-04-30 PROCEDURE — 36415 COLL VENOUS BLD VENIPUNCTURE: CPT | Performed by: STUDENT IN AN ORGANIZED HEALTH CARE EDUCATION/TRAINING PROGRAM

## 2021-04-30 PROCEDURE — 258N000003 HC RX IP 258 OP 636: Performed by: SURGERY

## 2021-04-30 PROCEDURE — 99024 POSTOP FOLLOW-UP VISIT: CPT | Performed by: NURSE PRACTITIONER

## 2021-04-30 PROCEDURE — 272N000458 ZZ HC KIT, 5 FR DL BIOFLO OPEN ENDED PICC

## 2021-04-30 PROCEDURE — U0003 INFECTIOUS AGENT DETECTION BY NUCLEIC ACID (DNA OR RNA); SEVERE ACUTE RESPIRATORY SYNDROME CORONAVIRUS 2 (SARS-COV-2) (CORONAVIRUS DISEASE [COVID-19]), AMPLIFIED PROBE TECHNIQUE, MAKING USE OF HIGH THROUGHPUT TECHNOLOGIES AS DESCRIBED BY CMS-2020-01-R: HCPCS | Performed by: NURSE PRACTITIONER

## 2021-04-30 PROCEDURE — 250N000013 HC RX MED GY IP 250 OP 250 PS 637: Performed by: DIETITIAN, REGISTERED

## 2021-04-30 RX ORDER — HEPARIN SODIUM,PORCINE 10 UNIT/ML
2-5 VIAL (ML) INTRAVENOUS
Status: DISCONTINUED | OUTPATIENT
Start: 2021-04-30 | End: 2021-05-02 | Stop reason: HOSPADM

## 2021-04-30 RX ORDER — ONDANSETRON 2 MG/ML
4 INJECTION INTRAMUSCULAR; INTRAVENOUS EVERY 6 HOURS PRN
Status: ON HOLD | DISCHARGE
Start: 2021-04-30 | End: 2021-05-22

## 2021-04-30 RX ORDER — LIDOCAINE 40 MG/G
CREAM TOPICAL
Status: DISCONTINUED | OUTPATIENT
Start: 2021-04-30 | End: 2021-05-02 | Stop reason: HOSPADM

## 2021-04-30 RX ORDER — ONDANSETRON 4 MG/1
4 TABLET, ORALLY DISINTEGRATING ORAL EVERY 6 HOURS PRN
Status: ON HOLD | DISCHARGE
Start: 2021-04-30 | End: 2021-05-22

## 2021-04-30 RX ORDER — METHOCARBAMOL 500 MG/1
500 TABLET, FILM COATED ORAL 4 TIMES DAILY PRN
Qty: 12 TABLET | Refills: 0 | Status: ON HOLD | DISCHARGE
Start: 2021-04-30 | End: 2021-05-22

## 2021-04-30 RX ORDER — ISOSORBIDE MONONITRATE 60 MG/1
60 TABLET, EXTENDED RELEASE ORAL DAILY
Status: ON HOLD | DISCHARGE
Start: 2021-05-01 | End: 2021-05-22

## 2021-04-30 RX ORDER — CEFAZOLIN SODIUM 2 G/100ML
2 INJECTION, SOLUTION INTRAVENOUS EVERY 12 HOURS
Status: ON HOLD | DISCHARGE
Start: 2021-04-30 | End: 2021-05-22

## 2021-04-30 RX ORDER — HEPARIN SODIUM,PORCINE 10 UNIT/ML
5-10 VIAL (ML) INTRAVENOUS
Status: ON HOLD | DISCHARGE
Start: 2021-04-30 | End: 2021-05-22

## 2021-04-30 RX ORDER — HEPARIN SODIUM,PORCINE 10 UNIT/ML
5-10 VIAL (ML) INTRAVENOUS EVERY 24 HOURS
Status: ON HOLD | DISCHARGE
Start: 2021-04-30 | End: 2021-05-22

## 2021-04-30 RX ORDER — HEPARIN SODIUM,PORCINE 10 UNIT/ML
5-10 VIAL (ML) INTRAVENOUS
Status: DISCONTINUED | OUTPATIENT
Start: 2021-04-30 | End: 2021-05-02 | Stop reason: HOSPADM

## 2021-04-30 RX ORDER — HEPARIN SODIUM,PORCINE 10 UNIT/ML
5-10 VIAL (ML) INTRAVENOUS EVERY 24 HOURS
Status: DISCONTINUED | OUTPATIENT
Start: 2021-04-30 | End: 2021-05-02 | Stop reason: HOSPADM

## 2021-04-30 RX ORDER — RIFAMPIN 300 MG/1
300 CAPSULE ORAL EVERY 12 HOURS SCHEDULED
Status: DISCONTINUED | OUTPATIENT
Start: 2021-04-30 | End: 2021-04-30

## 2021-04-30 RX ADMIN — ACETAMINOPHEN 650 MG: 325 TABLET, FILM COATED ORAL at 15:34

## 2021-04-30 RX ADMIN — ONDANSETRON 4 MG: 2 INJECTION INTRAMUSCULAR; INTRAVENOUS at 10:32

## 2021-04-30 RX ADMIN — TICAGRELOR 90 MG: 90 TABLET ORAL at 08:30

## 2021-04-30 RX ADMIN — TICAGRELOR 90 MG: 90 TABLET ORAL at 20:00

## 2021-04-30 RX ADMIN — LISINOPRIL 5 MG: 5 TABLET ORAL at 08:30

## 2021-04-30 RX ADMIN — LIDOCAINE HYDROCHLORIDE ANHYDROUS 1 ML: 10 INJECTION, SOLUTION INFILTRATION at 10:11

## 2021-04-30 RX ADMIN — MELATONIN TAB 3 MG 6 MG: 3 TAB at 21:27

## 2021-04-30 RX ADMIN — HYDROMORPHONE HYDROCHLORIDE 1 MG: 1 INJECTION, SOLUTION INTRAMUSCULAR; INTRAVENOUS; SUBCUTANEOUS at 10:24

## 2021-04-30 RX ADMIN — CEFAZOLIN SODIUM 2 G: 2 INJECTION, SOLUTION INTRAVENOUS at 08:44

## 2021-04-30 RX ADMIN — PREDNISONE 5 MG: 5 TABLET ORAL at 08:31

## 2021-04-30 RX ADMIN — ISOSORBIDE MONONITRATE 60 MG: 60 TABLET, EXTENDED RELEASE ORAL at 08:29

## 2021-04-30 RX ADMIN — FLUTICASONE PROPIONATE 2 SPRAY: 50 SPRAY, METERED NASAL at 08:36

## 2021-04-30 RX ADMIN — ACETAMINOPHEN 650 MG: 325 TABLET, FILM COATED ORAL at 03:41

## 2021-04-30 RX ADMIN — ASPIRIN 81 MG CHEWABLE TABLET 81 MG: 81 TABLET CHEWABLE at 08:30

## 2021-04-30 RX ADMIN — MYCOPHENOLIC ACID 360 MG: 360 TABLET, DELAYED RELEASE ORAL at 20:00

## 2021-04-30 RX ADMIN — Medication 5 ML: at 15:35

## 2021-04-30 RX ADMIN — CEFAZOLIN SODIUM 2 G: 2 INJECTION, SOLUTION INTRAVENOUS at 20:00

## 2021-04-30 RX ADMIN — ATORVASTATIN CALCIUM 80 MG: 40 TABLET, FILM COATED ORAL at 08:31

## 2021-04-30 RX ADMIN — SODIUM CHLORIDE: 9 INJECTION, SOLUTION INTRAVENOUS at 14:47

## 2021-04-30 RX ADMIN — MYCOPHENOLIC ACID 360 MG: 360 TABLET, DELAYED RELEASE ORAL at 08:29

## 2021-04-30 ASSESSMENT — ACTIVITIES OF DAILY LIVING (ADL)
ADLS_ACUITY_SCORE: 18
ADLS_ACUITY_SCORE: 16

## 2021-04-30 ASSESSMENT — MIFFLIN-ST. JEOR: SCORE: 951.25

## 2021-04-30 NOTE — PROCEDURES
[facility]    Double Lumen PICC Placement    Date/Time: 4/30/2021 10:01 AM  Performed by: Moo Hernandez RN  Authorized by: Jessica Bunn NP   Indications: Antibiotic.    UNIVERSAL PROTOCOL   Site Marked: Yes  Prior Images Obtained and Reviewed:  Yes  Required items: Required blood products, implants, devices and special equipment available    Patient identity confirmed:  Verbally with patient, arm band, provided demographic data and hospital-assigned identification number  NA - No sedation, light sedation, or local anesthesia  Confirmation Checklist:  Patient's identity using two indicators, relevant allergies, procedure was appropriate and matched the consent or emergent situation and correct equipment/implants were available  Time out: Immediately prior to the procedure a time out was called    Universal Protocol: the Joint Commission Universal Protocol was followed    Preparation: Patient was prepped and draped in usual sterile fashion           ANESTHESIA    Anesthesia: See MAR for details  Local Anesthetic:  Lidocaine 1% without epinephrine  Anesthetic Total (mL):  1      SEDATION    Patient Sedated: No        Preparation: skin prepped with ChloraPrep  Skin prep agent: skin prep agent completely dried prior to procedure  Sterile barriers: maximum sterile barriers were used: cap, mask, sterile gown, sterile gloves, and large sterile sheet  Hand hygiene: hand hygiene performed prior to central venous catheter insertion  Type of line used: Power PICC  Catheter type: double lumen  Lumen type: non-valved  Catheter size: 5 Fr  Brand: Bard  Lot number: [id number]  Placement method: venipuncture, MST, ultrasound and tip confirmation system  Number of attempts: 1  Successful placement: yes  Orientation: right  Location: basilic vein  Arm circumference: adults 10 cm  Extremity circumference: 19  Visible catheter length: 0  Total catheter length: 39  Dressing and  securement: chlorhexidine disc applied, glue, statlock, sterile dressing applied and site cleaned  Post procedure assessment: blood return through all ports, free fluid flow and placement verified by x-ray  PROCEDURE   Patient Tolerance:  Patient tolerated the procedure well with no immediate complications  Describe Procedure: PICC is OK to use.

## 2021-04-30 NOTE — PLAN OF CARE
OT-6B: Cancel. Pt just had wound vac changes this AM. Not appropriate at time of check in. Will reschedule.

## 2021-04-30 NOTE — PROGRESS NOTES
Brief Progress Note    RECOMMENDATIONS:  - Continue lisinopril 5 mg daily, increase as tolerated  - avoid BB given recent vasospasm event until she follows up as an outpatient  - avoid non DHP CCBs (verapamil, diltiazem) given HFrEF. Can start amlodipine 5mg daily in the future if necessary to prevent further vasospasm    - Imdur 60mg daily  - Continue PO ASA 81mg, continue PO Brilinta 90mg BID  - Hematology has recommended lifelong DOAC for recurrent DVT. When safe from a surgical standpoint can discontinue aspirin and restart eliquis and continue ticagrelor 90mg BID.  - Continue PO Lipitor 80mg qday   - Aldosterone blockade for CAD/STEMI ischemic cardiomyopathy    ASSESSMENT/PLAN:   Maribel Yang is a 70 year old female who presents with PMHx of inferior STEMI s/p RCA stent on 4/07/2021, DVT, AAA prior fem-fem bypass s/p EVAR on 4/06/2021 who presented with  right femoral erythema that was later found to be secondary to surgical site infection and perigraft fluid collection.     She underwent groin washout, sartorius muscle flap and redo fem fem bypass with cadaveric artery on 4/25. Morning Brillinta dose held against recommendations of cardiology consult service. Post operatively was found to have inferior ST elevations and taken to cath lab, found to have diffuse coronary vasospasm with complete occlusion of RCA that reopened with nitroglycerin. She was reloaded with a total of 180 brillinta between PACU and cath lab.    Regarding prior STEMI: After EVAR on 4/6 developed significant chest comfort, EKG was noted to reveal inferior STEMI that necessitated LIUDMILA to the proximal RCA with plans for DAPT for one year. Patient was then discharged with plans to follow up with Cardiology.    #Coronary vasospasm  #Inferior STEMI s/p LIUDMILA to RCA  #HFrEF with an EF 35-40%  She underwent groin washout, sartorius muscle flap and redo fem fem bypass with cadaveric artery on 4/25. Morning Brillinta dose held against  recommendations of cardiology consult service. Post operatively was found to have inferior ST elevations and taken to cath lab, found to have diffuse coronary vasospasm with complete occlusion of RCA that reopened with nitroglycerin. She was reloaded with a total of 180 brillinta between PACU and cath lab. She is chest pain/pressure free post cath on a nitroglycerin ggt. Troponin tending up. Vasospasm likely occurred due to stress of surgery as well as any pressors that she required in the OR. EKG this AM shows no ST elevations, there are TWI in inferior leads. TTE 4/26 showed EF 35-40% with unchanged inferior WMAs, troponin peaked at 29.5.    # S. Aureus bacteremia  Blood cultures positive on 4/23 for S. Aureus. This appears to have similar sensitivities to the S. Aureus that grew out of her wound cultures. She is currently on vancomycin and zosyn.  - ROLY (4/28) without evidence of endocarditis    Nikki Pan MD  Cardiology Fellow

## 2021-04-30 NOTE — PROGRESS NOTES
[facility]  Transplant Nephrology Progress Note  Date of Admission:  4/23/2021  Today's Date: 04/30/2021  Requesting physician: Raven Lewis MD    Recommendations:  - start amlodipine 5 mg daily.   - continue MPA and prednisone 5 mg.  - Renal graft function is stable, will continue to follow.     Assessment & Plan   # LDKT: Stable   - Baseline Creatinine: ~ 1.3-1.6   - Proteinuria: Not checked recently   - Date DSA Last Checked: Apr/2018      Latest DSA: No   - BK Viremia: Not checked recently due to time from transplant   - Kidney Tx Biopsy: No    # Immunosuppression: Mycophenolic acid (dose 360 mg every 12 hours) and Prednisone (dose 5 mg daily)   - Changes: No    # Infection Prophylaxis:   - PJP: None    # Hypertension: Borderline control;  Goal BP: < 130/80   - Volume status: Euvolemic     - Changes: start amlodipine 5 mg qd    # Anemia in Chronic Renal Disease: Hgb: Stable      JONATHAN: No   - Iron studies: Not checked recently    # Mineral Bone Disorder:   - Vitamin D; level: Not checked recently        On supplement: No  - Calcium; level: Normal        On supplement: No    # Electrolytes:   - Potassium; level: Normal        On supplement: No  - Bicarbonate; level: Normal        On supplement: No    # infrarenal AAA, s/p EVAR with fem-fem bypass on 4/6 c/b infected R femoral graft s/p  Debridement. management per surgery.    # MSSA bacteremia 2/2 groin abscess and endovascular fem-fem bypass graft infection now s/p I&D, washout and new graft placement. ECHO with no vegetations.  ID following..on iv cefazolin    # Transplant History:  Etiology of Kidney Failure: Alport's syndrome  Tx: LDKT  Transplant: 9/20/2004 (Kidney)  Significant changes in immunosuppression: None  Significant transplant-related complications: Recurrent Skin Cancers    Recommendations were communicated to the primary team via this note.    Seen and discussed with Dr. Pili Del Angel,  "MD  Pager: [phone]    This patient has been seen and evaluated by me, Flaca Alejandre MD.  I have reviewed the note and agree with plan of care as documented by the fellow.    Flaca Alejandre MD on 2021     Interval:  BP is in 150 range systolic today. She mouna any headaches or CP. Stable renal function. She mouna any active complaints.     Review of Systems    5 point Review of Systems is negative other than noted in the interval or here.      Physical Exam   Temp  Av.6  F (37  C)  Min: 97.2  F (36.2  C)  Max: 100.7  F (38.2  C)  Arterial Line BP  Min: 49/43  Max: 172/90  Arterial Line MAP (mmHg)  Av.1 mmHg  Min: 48 mmHg  Max: 131 mmHg      Pulse  Av.6  Min: 50  Max: 95 Resp  Av.6  Min: 10  Max: 40  SpO2  Av.2 %  Min: 88 %  Max: 100 %     /87 (BP Location: Left arm)   Pulse 93   Temp 97.4  F (36.3  C) (Oral)   Resp 18   Ht 1.575 m (5' 2\")   Wt 46.8 kg (103 lb 2.8 oz)   SpO2 96%   BMI 18.87 kg/m     Date 21 07 - 21 0659   Shift 7335-6568 3180-6782 3013-3672 24 Hour Total   INTAKE   P.O. 100   100   I.V. 227.8   227.8   Blood Components 300   300   Shift Total(mL/kg) 627.8(13.13)   627.8(13.13)   OUTPUT   Urine 420   420   Blood 20   20   Shift Total(mL/kg) 440(9.21)   440(9.21)   Weight (kg) 47.8 47.8 47.8 47.8      Admit Weight: 46.2 kg (101 lb 14.4 oz)     GENERAL APPEARANCE: alert and no distress  RESP: lungs clear to auscultation - no rales, rhonchi or wheezes  CV: regular rhythm, normal rate, no rub, no murmur  EDEMA: no LE edema bilaterally  ABDOMEN: soft, ND  MS: extremities normal - no gross deformities noted, no evidence of inflammation in joints, no muscle tenderness  SKIN: no rash    Data   CMP  Recent Labs   Lab 21  0453 21  0519 21  0540 21  0830 21  0204 21  1328 21  0459 21  1935 21    141 138  --  138 138 140   < > 132*   POTASSIUM 3.5 3.8 3.8  --  3.8 4.1 4.4   < > 4.4 "   CHLORIDE 112* 111* 112*  --  110* 111* 112*   < > 102   CO2 20 23 21  --  23 18* 18*   < > 21   ANIONGAP 8 7 5  --  5 10 11   < > 9   GLC 79 89 87  --  110* 146* 138*   < > 112*   BUN 35* 36* 36*  --  42* 41* 38*   < > 36*   CR 1.20* 1.35* 1.46*  --  1.68* 1.58* 1.51*   < > 1.63*   GFRESTIMATED 46* 40* 37*  --  31* 33* 35*   < > 32*   GFRESTBLACK 54* 47* 42*  --  36* 38* 41*   < > 37*   KAYKAY 8.0* 8.2* 8.6  --  8.6 8.4* 8.4*   < > 9.2   MAG  --   --  2.7* 2.8* 3.2* 1.9 2.0   < >  --    PHOS  --   --  2.8  --  3.4 4.5 4.6*   < >  --    PROTTOTAL  --   --   --   --   --   --   --   --  6.1*   ALBUMIN  --   --   --   --   --   --   --   --  2.0*   BILITOTAL  --   --   --   --   --   --   --   --  0.5   ALKPHOS  --   --   --   --   --   --   --   --  169*   AST  --   --   --   --   --   --   --   --  16   ALT  --   --   --   --   --   --   --   --  15    < > = values in this interval not displayed.     CBC  Recent Labs   Lab 04/30/21  0453 04/29/21  0519 04/28/21  0540 04/27/21  0204   HGB 8.1* 7.5* 7.8* 8.1*   WBC 14.1* 14.0* 13.3* 14.1*   RBC 2.86* 2.73* 2.80* 2.91*   HCT 26.4* 24.9* 25.6* 25.3*   MCV 92 91 91 87   MCH 28.3 27.5 27.9 27.8   MCHC 30.7* 30.1* 30.5* 32.0   RDW 18.6* 18.1* 18.3* 18.4*    264 292 324     INR  Recent Labs   Lab 04/25/21  2200   INR 1.18*   PTT 26     ABG  Recent Labs   Lab 04/25/21 2011 04/25/21  1912 04/24/21  1313   PH 7.30* 7.32* 7.46*   PCO2 43 39 29*   PO2 110* 124* 58*   HCO3 21 20* 21   O2PER 0.44 43.0 30.0      Urine Studies  Recent Labs   Lab Test 01/20/14  0841   COLOR Light Yellow   APPEARANCE Clear   URINEGLC Negative   URINEBILI Negative   URINEKETONE Negative   SG 1.013   UBLD Negative   URINEPH 5.0   PROTEIN Negative   NITRITE Negative   LEUKEST Moderate*   RBCU 1   WBCU 2     Recent Labs   Lab Test 04/24/18  1812 01/31/17  1030 06/03/16  0900 10/22/15  1024 06/25/15  1007 01/14/15  0902 04/07/14  0921   UTPG 1.04* 1.67* 0.76* 0.44* 0.31* 0.27* 0.05     PTH  Recent  Labs   Lab Test 06/12/19  1810   PTHI 89*     Iron Studies  No lab results found.    IMAGING:  All imaging studies reviewed by me.

## 2021-04-30 NOTE — PROGRESS NOTES
Vascular Staff    The ability to monitor the groin wounds is as important as the antibiotic coverage. I would like the patient to go to TCU on [place] with IV cefazolin via her PICC line. We will not be adding another antibiotic.     Please note patient does not have an endovascular graft infection as stated in ID note. She had an infection of bovine fem-fem graft.     Abena Ferrara MD, DFSVS, RPVI  Director, [facility] Vascular Services  Chief, Vascular and Endovascular Surgery  [facility]  [email or web address]  Pager: Herminia Baker

## 2021-04-30 NOTE — PROGRESS NOTES
CLINICAL NUTRITION SERVICES - ASSESSMENT NOTE     Nutrition Prescription    RECOMMENDATIONS FOR MDs/PROVIDERS TO ORDER:  --Encourage PO intake.  --Consider starting a MVI with minerals supplement.     Malnutrition Status:    Severe malnutrition in the context of acute on chronic illness    Recommendations already ordered by Registered Dietitian (RD):  --Adjusted supplements per pt preference and to increase nutrient density: Ensure Enlive (strawberry) @ 10 am and HS (350 kcal and 20 g pro each); Magic Cup (berry) with lunch and dinner (290 kcal, 9 g pro each)    Future/Additional Recommendations:  --Monitor PO intake and supplement tolerance/acceptance.  --Weight trends.  --Need for calorie counts.      REASON FOR ASSESSMENT  Maribel Yang is a/an 70 year old female assessed by the dietitian for LOS    NUTRITION HISTORY  PMH infrarenal AAA, kidney transplant, s/p EVAR with fem-fem bypass on 4/6 c/b inferior MI with LIUDMILA in the RCA. Admitted on 4/23 with infected R femoral graft.   4/25: Explantation of femoral to femoral bypass graft; femoral to femoral bypass graft using cadaveric homograft (tunneled retropubic); bilateral sartorius muscle flaps; placement of negative pressure wound vac therapy.   4/26: Cardiac catheterization for post-op EKG changes; RCA stent patent; diffuse vasospasm of LAD, LCx.  Returned to OR for bilateral groin washout and placement of Veraflo wound vac system.    Pt reports appetite is improving, her lunch had just arrived and she was working on a chicken quesadilla. Pt states she has been ordering an additional fresh fruit cup or rice krispie treat to snack on during the day. Pt says she is drinking the Ensure Max Protein supplements, though isn't very fond of them. Discussed alternative supplement options, pt agreed to try strawberry Ensure Enlive and berry Magic Cups, which are also more calorie/protein dense compared to Ensure Max Protein. Kept conversation brief to allow pt to  "continue eating lunch.     CURRENT NUTRITION ORDERS  Diet: Regular, Ensure Max Protein between meals (ordered by provider; contains 150 kcal, 30 g pro each)  Intake/Tolerance: 25-75%    LABS  Cr 1.20 (H)  K+ 3.5 (low normal)    MEDICATIONS  Mycophenolic acid  Prednisone   NS @ 50 ml/hr    ANTHROPOMETRICS  Height: 157.5 cm (5' 2\")  Most Recent Weight: 46.8 kg (103 lb 2.8 oz); lowest weight this admission: 46.2 kg on 4/23  IBW: 50 kg  BMI: Normal BMI  Weight History: difficult to assess 2/2 fluid shifts  Wt Readings from Last 20 Encounters:   04/30/21 46.8 kg (103 lb 2.8 oz)   04/10/21 48 kg (105 lb 13.1 oz)   04/01/21 42.6 kg (94 lb)   03/16/21 43.3 kg (95 lb 6.4 oz)   09/08/20 42.4 kg (93 lb 8 oz)   09/02/20 42 kg (92 lb 11.2 oz)   05/19/20 42.2 kg (93 lb)   03/16/20 42.4 kg (93 lb 6.4 oz)   03/07/20 45.8 kg (101 lb)   02/21/20 42.2 kg (93 lb)   09/10/19 44 kg (96 lb 14.4 oz)   08/19/19 43.6 kg (96 lb 3.2 oz)   08/07/19 44 kg (97 lb)   06/12/19 43.8 kg (96 lb 8 oz)   05/06/19 44.2 kg (97 lb 6.4 oz)   04/26/19 45 kg (99 lb 3.2 oz)   04/09/19 45.8 kg (101 lb)   03/13/19 45.8 kg (100 lb 14.4 oz)   02/18/19 45.1 kg (99 lb 6.4 oz)   02/05/19 45.8 kg (101 lb)     Dosing Weight: 46 kg (lowest weight this admission on 4/23)    ASSESSED NUTRITION NEEDS  Estimated Energy Needs: 3305-5938 kcals/day (30 - 35 kcals/kg )  Justification: Increased needs, Repletion, and Wound healing  Estimated Protein Needs: 69-92 grams protein/day (1.5 - 2 grams of pro/kg)  Justification: Increased needs, Repletion, and Wound healing  Estimated Fluid Needs: (1 mL/kcal)   Justification: Maintenance and Per provider pending fluid status    PHYSICAL FINDINGS  See malnutrition section below.  WOCN note 4/26:   Right fleshy buttock partial thickness wound consistent with friction or abrasion, not pressure.  Birth defect causing dimple over the sacrum   No Pressure Injury on buttocks at this time.     Last BM 4/29    MALNUTRITION  % Intake: < 75% for > " 7 days (non-severe)  % Weight Loss: difficult to assess 2/2 fluid shifts  Subcutaneous Fat Loss: Facial region:  Moderate on visual inspection  Muscle Loss: Temporal:  moderate, Facial & jaw region:  moderate and Thoracic region (clavicle, acromium bone, deltoid, trapezius, pectoral):  Severe - on visual inspection  Fluid Accumulation/Edema: moderate 3+ edema (bilateral hips), 1+ trace edema (feet)  Malnutrition Diagnosis: Severe malnutrition in the context of acute on chronic illness    NUTRITION DIAGNOSIS  Inadequate oral intake related to reduced appetite and frequent NPO status 2/2 multiple surgeries as evidenced by pt report of PO intake with documentation of </= 75% of meals consumed since admission, and moderate to severe muscle/fat wasting on NFPE.    INTERVENTIONS  Implementation  Nutrition education for recommended modifications   Medical food supplement therapy     Goals  Patient to consume % of nutritionally adequate meal trays TID, or the equivalent with supplements/snacks.     Monitoring/Evaluation  Progress toward goals will be monitored and evaluated per protocol.    Syl To, MS, RD, LD, CNSC  6B RD Pager: [phone]

## 2021-04-30 NOTE — PROGRESS NOTES
Therapy: IV abx  Insurance: [facility] Medicare    Pt has [facility] Medicare, which does not cover IV ABX in the home. (Pt would have coverage for short term TCU or IC). Below is what pt would be responsible for if pt wanted to go w [facility]               Drug would go to Part-D (pt would be responsible for the co-pay per dispense)            Pt would have to self-pay for the per-al[place]ina (daily)            If not homebound, nursing would also be self-pay (per visit)    Based on Cefazolin 2gm bid, will be roughly $33.48 daily for drug and supplies. Must be homebound for nursing coverage or would cost $90 per visit if [facility] bills  for it.    In reference to admission on  4/24/21 to check IV abx coverage    Please contact Intake with any questions, [phone] or In Basket pool, [facility] ([phone]).

## 2021-04-30 NOTE — PROGRESS NOTES
Vascular Staff    Patient will be discharged on cefazolin iv tomorrow to rehab. I will be managing length of IV antibiotics and oral antibiotics based on wounds, rehabilitation and my follow up of her grafts which I will be following lifelong. We will be following her at rehab. No plans to add Rifampin or other antibiotics at this time. Vascular Surgery will be managing all orders regarding antibiotics and wound care. Appreciate ID consultation and input.    Abena Ferrara MD, DFSVS, RPVI  Director, [facility] Vascular Services  Chief, Vascular and Endovascular Surgery  [facility]  [email or web address]  Pager: Herminia Baker

## 2021-04-30 NOTE — PLAN OF CARE
Neuro: A&Ox4.   Cardiac: SR. VSS.   Respiratory: Sating adequately on RA.  GI/: Adequate urine output. Commode used throughout the night.  Diet/appetite: Tolerating regular diet. Eating well.  Activity:  Assist of 2, up to commode  Pain: At acceptable level on current regimen.   Skin: No new deficits noted.  LDA's: PIV, wound vac    Plan: Continue with POC. Notify primary team with changes.

## 2021-04-30 NOTE — PROVIDER NOTIFICATION
04/30/21 1000   PICC Double Lumen 04/30/21 Right Basilic   Placement Date/Time: 04/30/21 (c) 1001   Catheter Brand: BrightQube  Size (Fr): 5 Fr  Lot #: [id number]  Full barrier precautions done: Yes, hand hygiene, sterile gown, sterile gloves, mask, cap, full body drape, chlorhexidine scrub  Consent Signed: Yes  Time...   Site Assessment WDL   External Cath Length (cm) 0 cm   Extremity Circumference (cm) 19 cm   Dressing Intervention Chlorhexidine patch;Transparent;Securing device;New dressing   Dressing Change Due 05/07/21   Purple - Status blood return noted;saline locked   Purple - Cap Change Due 05/04/21   Red - Status blood return noted;saline locked   Red - Cap Change Due 05/04/21   PICC Comment PICC inserted   Extravasation? No   Line Necessity Yes, meets criteria

## 2021-04-30 NOTE — PROGRESS NOTES
SOLID ORGAN TRANSPLANT INFECTIOUS DISEASES PROGRESS NOTE     Patient:  Maribel Yang   YOB: 1950  Date of Visit:  04/30/2021  Date of Admission: 4/23/2021  Consult Requester:Abena Ferrara MD          Assessment and Recommendations:   Recommendations:   - Continue cefazolin 2g IV q12h for MSSA treatment. Anticipate minimum of 6 weeks from clearance of bacteremia (first negative blood culture). Anticipated duration: 4/27-6/8  - Monitor renal function/CMP weekly. If Creatinine clearance increases > 50 ml/min, increase Cefazolin dose to 2gm IV q8h  - Consider adding rifabutin 300mg daily given cadaveric graft was placed while patient was still bacteremic and also due to presence of aortic graft. As discussed with Vascular Surgery team, the aortic graft is physically separate from infected fem-fem graft site and there is no evidence of infection outside the fem-fem site involving the aortic graft. Rifampin is not recommended for use in her case due to interaction with Brilinta. A case series supports use of rifabutin with fewer interactions compared to rifampin but similar effectiveness in reducing biofilm formation (https://www.ncbi.nlm.nih.gov/pmc/articles/VCD1768332/).    - Starting a week after completion of cefazolin (anticipate around 6/15) would plan to check weekly surveillance BC for 4 weeks. If any of those blood cultures return positive she will need to restart a course of cefazolin and will then require suppressive antibiotic therapy.  - Plan for Transplant ID follow up with Dr. Flores on 6/3 to establish final duration of antibiotics and other follow-up needs. Will assist with arranging this appointment.   - While on IV antibiotics check weekly CBC w/ diff and CMP. Labs can be faxed to Dr. Flores in ID clinic at [phone]    Transplant ID will sign off. Please do not hesitate to contact with additional questions or concerns as they arise.    Delia Villalobos PA-C    Pronouns: she/her/hers  Infectious Diseases  Pager: [phone]  04/30/2021    Assessment:  Maribel Yang is a 70 year old female with PMH significant for ESRD s/p LDKT 9/20/2004, lung cancer s/p SBRT (2014), anal canal carcinoma s/p chemoradiation (6/2018), and 5.9cm AAA and occluded right external iliac artery s/p EVAR with femorofemoral bypass (4/6/21) c/b inferior STEMI s/p LIUDMILA to proximal RCA who was admitted on 4/23 due to endovascular infection at site of previous surgery.      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 On discussion with the Vascular Surgery team, the infection was limited to the bovine femorofemoral bypass site with no evidence of extension of the infection beyond that area. The aortic graft is physically separate from the fem-fem graft and was without evidence of infection.     BC, I&D culture 4/24, and cultures from 4/25 grew Staph aureus, MSSA by sensitivities. Initially on Zosyn and Vancomycin, then transitioned to cefazolin on 4/27 for targeted MSSA therapy. BC from 4/23 and 4/26 with MSSA. No growth on 4/27, 4/28 or 4/29 BC so far. Of note, 4/26 BC was positive the day after bovine graft explant and placement of cadaveric graft. Although bacteremia was persistent, this was a day after source control achieved and prior to her being switched to targeted MSSA therapy. ROLY was done 4/28 and was negative for vegetations. She does not have back pain or joint pain currently, so low suspicion for other metastatic sites of infection. She does not have any artificial joints, but does have a metal piece in her toe. Her toe is not more tender  "than usual, so low suspicion for infectious involvement.    Anticipate a minimum of 6 weeks of antibiotics from clearance of bacteremia. Would consider the use of rifabutin as an adjunctive therapy with cefazolin as new cadaveric fem-fem graft was placed while patient was still bacteremic. The aortic graft is not contiguous with the fem-fem site, and per discussion with Vascular surgery infection did not appear to extend to it. Rifabutin is preferred over rifampin in this case due to drug-drug interaction with Brilinta and is supported by a case series in patients with similar Staph infections (https://www.ncbi.nlm.nih.gov/pmc/articles/GBU1940353/). Either way, would plan to check surveillance BC starting a week after the end of cefazolin +/- rifabutin. Plan to check weekly for 4 weeks and if any BC are positive she will need additional course of cefazolin followed by suppressive antibiotics.      # LDKT 9/20/2004  Previously on sirolimus but transitioned 4/2/21 to MMF keri-operatively with ongoing prednisone.     Previous ID Issues:  - none known     Other ID issues:  - QTc interval:  477 msec on 4/23/21   - Pneumocystis prophylaxis:  none  - Viral serostatus: unknown  - Viral prophylaxis:  none  - Fungal prophylaxis:  none  - Immunosuppression: mycophenolic acid, prednisone  - Immunization status:  completed Moderna COVID-19 vaccine 2/23/21  - Gamma globulin status:  1,100 on 3/1/21  - Isolation status: standard precautions         Interval History:   Afebrile. Sating mid 90s on room air. No growth on 4/27 - 4/29 BC thus far. Maribel is seen after PICC placement and wound vac change. She reports she is feeling \"high\" on pain meds. No SOB. No abdominal pain, n/d/d. No joint pain or back pain.    Antimicrobials:  Current  - cefazolin 4/27-current     Past  - Ceftriaxone 4/23  - Pip-tazo 4/24-4/27  - Vancomycin 4/23-4/27         History of Present Illness:   Adopted from initial consult note:    Transplants:  " 9/20/2004 (Kidney), Postoperative day:  6066     Maribel Yang is a 70 year old female with PMH significant for ESRD s/p LDKT 9/20/2004 (previously on sirolimus transitioned 4/2/21 to MMF keri-operatively; prednisone), lung cancer s/p SBRT (2014), anal canal carcinoma s/p chemoradiation (6/2018), and 5.9cm AAA and occluded right external iliac artery s/p EVAR with femorofemoral bypass (4/6/21) c/b inferior STEMI s/p LIUDMILA to proximal RCA who presented to the ED on 4/23 due to pain and swelling in right groin at site of prior procedure and fever.     She previously underwent endovascular abdominal aortic aneurysm repair with aorto uniiliac endovascular repair and femorofemoral bypass as well as plugging of the right common iliac artery to prevent endoleak on 4/6/2021. Post-op course was complicated by inferior STEMI for which she was taken emergently to the cath lab for PCI with LIUDMILA of the proximal RCA. She was able to be discharged to home on 4/10. She was initially doing well at home, although did experience some low back pain. During virtual follow-up visit with vascular surgery on 4/19 it was reported that her incision was healing well and without redness, swelling or drainage. However, the following day she called regarding increased redness, pain, and warmth in the right groin near incision. She was prescribed Keflex on 4/22, however, symptoms worsened and she developed fever up to 103.6 at home which prompted her to report to ED.     In the ED she was noted to be afebrile with mild leukocytosis of 13.4. BC were taken and on Day 2 are growing MSSA in 1 of 2 cultures. She underwent right groin I&D on 4/24 and purulent fluid was seen surrounding right groin Artegraft and bypass graft. Culture was taken which is now growing Staph aureus. She then underwent explantation of the femorofemoral bypass graft, femoral to femoral bypass graft using cadaveric homograft, bilateral sartorius muscle flap and wound vac  placement on 4/25. Cultures were collected and are currently pending. Following procedure she developed diffuse coronary vasospasm with complete occlusion of RCA that was reopened with nitroglycerin. She was transferred to SICU. She returned to OR 4/26 with no significant expressible purulence seen. She underwent debridement of some of the muscle of the left sartorius muscle flap with wound vac reapplied.            Review of Systems:   Targeted 5 point review of systems was negative except for noted as above the interval history section.          Current Medications:      aspirin  81 mg Oral Daily    atorvastatin  80 mg Oral Daily    ceFAZolin  2 g Intravenous Q12H    fentaNYL  75 mcg Intravenous Once    fluticasone  2 spray Both Nostrils Daily    isosorbide mononitrate  60 mg Oral Daily    lisinopril  5 mg Oral Daily    melatonin  6 mg Oral At Bedtime    mycophenolic acid  360 mg Oral BID    predniSONE  5 mg Oral Daily    sodium chloride (PF)  10 mL Intravenous Once    sodium chloride (PF)  3 mL Intravenous Q8H    sodium chloride (PF)  3 mL Intracatheter Q8H    ticagrelor  90 mg Oral BID              Physical Exam:   Vitals were reviewed  Temp:  [97.6  F (36.4  C)-98.3  F (36.8  C)] 97.6  F (36.4  C)  Pulse:  [70-87] 78  Resp:  [16-20] 18  BP: (133-164)/(74-95) 163/90  SpO2:  [94 %-96 %] 95 %    Vitals:    04/23/21 1928 04/26/21 0500 04/28/21 0500 04/29/21 0129   Weight: 46.2 kg (101 lb 14.4 oz) 47.8 kg (105 lb 6.1 oz) 48.2 kg (106 lb 4.2 oz) 48.5 kg (106 lb 14.8 oz)    04/30/21 0319   Weight: 46.8 kg (103 lb 2.8 oz)     Constitutional: Adult female seen lying in bed, in NAD. Sleeping but wakes to voice.   HEENT: NC/AT, EOMI, sclera clear, conjunctiva normal, OP with MMM  Respiratory: No increased work of breathing, CTAB in anterior lung fields, no crackles or wheezing.  Cardiovascular: RRR, no murmur noted. No peripheral edema in upper extremities.  GI: non-distended.  Skin: Warm, dry, well-perfused. Scattered  bruising noted on upper extremities. Wound vac in place in b/l groin with some bruising, no evidence of infection  Musculoskeletal: Extremities grossly normal, non-tender.   Neurologic: A&O. Answers questions appropriately, speech normal. Moves all extremities spontaneously.  Neuropsychiatric: Calm. Affect appropriate to situation.  Vascular access:  PICC on RUE CDI, non-tender, no surrounding erythema.           Laboratory Data:   Microbiology:  Culture Micro   Date Value Ref Range Status   04/29/2021 No growth after 17 hours  Preliminary   04/29/2021 No growth after 17 hours  Preliminary   04/28/2021 No growth after 2 days  Preliminary   04/28/2021 No growth after 2 days  Preliminary   04/27/2021 No growth after 3 days  Preliminary   04/27/2021 No growth after 3 days  Preliminary   04/26/2021 (A)  Final    Cultured on the 1st day of incubation:  Staphylococcus aureus  Susceptibility testing done on previous specimen     04/26/2021   Final    Critical Value/Significant Value, preliminary result only, called to and read back by   Yary Morrow RN @1923 4.27.21 LM.     04/26/2021 (A)  Final    Cultured on the 1st day of incubation:  Staphylococcus aureus  Susceptibility testing done on previous specimen     04/26/2021   Final    Critical Value/Significant Value, preliminary result only, called to and read back by  Yary Morrow RN @1818 4.27.21          Metabolic Studies       Recent Labs   Lab Test 04/30/21  0453 04/29/21  0519 04/28/21  0540 04/25/21 2011 04/25/21 2011 04/08/21  0506 04/08/21  0506    141 138   < > 137   < > 141   POTASSIUM 3.5 3.8 3.8   < > 4.7   < > 3.6   CHLORIDE 112* 111* 112*   < >  --    < > 110*   CO2 20 23 21   < >  --    < > 24   ANIONGAP 8 7 5   < >  --    < > 8   BUN 35* 36* 36*   < >  --    < > 24   CR 1.20* 1.35* 1.46*   < >  --    < > 1.79*   GFRESTIMATED 46* 40* 37*   < >  --    < > 29*   GLC 79 89 87   < > 132*   < > 117*   A1C  --   --   --   --   --   --  5.6   KAYKAY 8.0*  8.2* 8.6   < >  --    < > 8.4*   PHOS  --   --  2.8   < >  --   --   --    MAG  --   --  2.7*   < >  --   --   --    LACT  --   --   --   --  0.9   < >  --     < > = values in this interval not displayed.       Hepatic Studies    Recent Labs   Lab Test 21  1935 19  1044 19  1044   BILITOTAL 0.5   < > 0.4   ALKPHOS 169*   < > 120   ALBUMIN 2.0*   < > 3.4   AST 16   < > 16   ALT 15   < > 20   LDH  --   --  164    < > = values in this interval not displayed.       Pancreatitis testing    Recent Labs   Lab Test 21  0506   TRIG 162*       Hematology Studies      Recent Labs   Lab Test 21  0453 21  0519 21  0540 21  19321  19321  0520 21  0506 21  0506   WBC 14.1* 14.0* 13.3*   < > 13.4* 10.3   < > 11.9*   ANEU  --   --   --   --  11.8* 8.8*  --  10.3*   ALYM  --   --   --   --  0.2* 0.4*  --  0.4*   SAUNDRA  --   --   --   --  1.2 0.9  --  1.1   AEOS  --   --   --   --  0.0 0.1  --  0.0   HGB 8.1* 7.5* 7.8*   < > 9.1* 9.0*   < > 7.5*   HCT 26.4* 24.9* 25.6*   < > 29.5* 27.5*   < > 23.1*    264 292   < > 529* 97*   < > 105*    < > = values in this interval not displayed.       Arterial Blood Gas Testing    Recent Labs   Lab Test 21   PH 7.30*   PCO2 43   PO2 110*   HCO3 21   O2PER 0.44             Imaging:     Recent Results (from the past 48 hour(s))   Transesophageal Echocardiogram    [facility]    [id number]  [id number]  [id number]  [id number]^MARIANA^SANDRA^CONCEPCION     [facility]  Echocardiography Laboratory  [address] [place] [id number]     Name: ERASMO MERINO  MRN: [id number]  : 1950  Study Date: 2021 10:23 AM  Age: 70 yrs  Gender: Female  Patient Location: [facility]  Reason For Study: 2nd degree AV Block  Ordering Physician: SANDRA PEÑA  Performed By: Otto Wise     BSA: 1.5 m2  Height: 62 in  Weight: 106 lb  HR: 99  BP: 120/80 mmHg  Attestation  I was  present during ROLY probe placement by the fellow, Otto Wise. I  personally viewed the imaging and agree with the interpretation and report as  documented by the fellow.  ______________________________________________________________________________  Interpretation Summary  No evidence of endocarditis.  ______________________________________________________________________________  Procedure  Transesophageal Echocardiogram with color and spectral Doppler performed. 3D  image acquisition, reconstruction, and real-time interpretation was performed.  I was present during ROLY probe placement by the Fellow. I personally viewed  the imaging and agree with the interpretation and report as documented by the  Fellow. Procedure location Operating Room. Informed consent for  Transesophegeal echo obtained. ROLY Probe #[id number] was used during the procedure.  Sedation was administered and monitored by anesthesia. Sedation, endotracheal  intubation, and mechanical ventilation were initiated prior to the ROLY and  were monitored by anesthesia. The Transducer was inserted without difficulty .  The patient tolerated the procedure well. Complications None. The patient's  rhythm is normal sinus. Good quality two-dimensional was performed and  interpreted. Good quality color and spectral Doppler were performed and  interpreted.     Left Ventricle  Moderately (EF 35-40%) reduced left ventricular function is present. There is  thinning and akinesis of the mid septal segment. Inferior wall akinesis is  present. Apical wall akinesis is present. Left ventricular size is normal.     Right Ventricle  Right ventricular function, chamber size, wall motion, and thickness are  normal.     Atria  The left atrial appendage is normal. It is free of spontaneous echo contrast  and thrombus. The left atrial appendage Doppler velocities are normal. Both  atria appear normal. The atrial septum is intact as assessed by color  Doppler .     Mitral Valve  The  mitral valve is normal. Mitral leaflet thickness is normal. Trace mitral  insufficiency is present.     Aortic Valve  The aortic valve is tricuspid. Lambl's excrescence is present. Trace aortic  insufficiency is present.     Tricuspid Valve  The tricuspid valve is normal. Trace tricuspid insufficiency is present.     Pulmonic Valve  The pulmonic valve is normal.     Vessels  The aorta root is normal. The thoracic aorta is normal. 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     Pericardium  Trivial pericardial effusion is present.     Compared to Previous Study  This study was compared with the study from 4/26/2021 . There has been no  change.     ______________________________________________________________________________  Report approved by: MD Kash Tomas 04/28/2021 12:04 PM     ______________________________________________________________________________

## 2021-04-30 NOTE — PROGRESS NOTES
"VASCULAR SURGERY PROGRESS NOTE    Subjective:  Reports she was able to get some sleep overnight.  Was up to commode with assist x 2, feeling very weak.  Pain is controled.  Blood cultures from 4-27 to 4-29 remain negative.    Objective:    Intake/Output Summary (Last 24 hours) at 4/30/2021 0740  Last data filed at 4/30/2021 0500  Gross per 24 hour   Intake 1040 ml   Output 1100 ml   Net -60 ml     Labs:  ROUTINE IP LABS (Last four results)  BMP  Recent Labs   Lab 04/30/21 0453 04/29/21 0519 04/28/21  0540 04/27/21  0204    141 138 138   POTASSIUM 3.5 3.8 3.8 3.8   CHLORIDE 112* 111* 112* 110*   KAYKAY 8.0* 8.2* 8.6 8.6   CO2 20 23 21 23   BUN 35* 36* 36* 42*   CR 1.20* 1.35* 1.46* 1.68*   GLC 79 89 87 110*     CBC  Recent Labs   Lab 04/30/21 0453 04/29/21 0519 04/28/21  0540 04/27/21  0204   WBC 14.1* 14.0* 13.3* 14.1*   RBC 2.86* 2.73* 2.80* 2.91*   HGB 8.1* 7.5* 7.8* 8.1*   HCT 26.4* 24.9* 25.6* 25.3*   MCV 92 91 91 87   MCH 28.3 27.5 27.9 27.8   MCHC 30.7* 30.1* 30.5* 32.0   RDW 18.6* 18.1* 18.3* 18.4*    264 292 324     INR  Recent Labs   Lab 04/25/21  2200   INR 1.18*     PHYSICAL EXAM:  BP (!) 163/95 (BP Location: Right arm)   Pulse 81   Temp 98.2  F (36.8  C) (Oral)   Resp 20   Ht 1.575 m (5' 2\")   Wt 46.8 kg (103 lb 2.8 oz)   SpO2 94%   BMI 18.87 kg/m    General: The patient is alert and oriented. Appropriate. No acute distress  Psych: pleasant affect, answers questions appropriately  Skin: Color appropriate for race, warm, dry.  Respiratory: The patient does not require supplemental oxygen. Breathing unlabored  GI:  Abdomen soft, nontender to light palpation.  Extremities: Bilateral groin wound vacs with good suction, keri-wound skin without erythema.  Generalized old bruising in groin area.  DP/PT signals multiphasic bilaterally, feet warm and pink.          ASSESSMENT:  69 yo F s/p EVAR AUI and left to right femoral to femoral bypass graft for 5.9 cm AAA. Initial post-op course was " complicated by inferior STEMI requiring emergent cardiac catheterization and PCI to RCA. Patient presenting on 4/23/2021 to ED with subjective fevers and chills and erythema and drainage from right groin wound.      4/24/2021 - Incision and drainage of right groin wound; packing of the wound with Adaptic, moistened Kerlix gauze, ABD pad     4/25/2021 - Explantation of femoral to femoral bypass graft; femoral to femoral bypass graft using cadaveric homograft (tunneled retropubic); bilateral sartorius muscle flaps; placement of negative pressure wound vac therapy.      4/26/2021 - Cardiac catheterization for post-op EKG changes; RCA stent patent; diffuse vasospasm of LAD, LCx.  Returned to OR for bilateral groin washout and placement of Veraflo wound vac system.     4/27/2021: transfer out of ICU.      PLAN:  -PICC line placement today  -Continue wound vac, will change this afternoon with Dr. Ferrara.  -Appreciate ID recs, continue Cefazolin 2g Q 12 hours, will add rifampin once dosing clarified.  -Asprin, Brillinta, Atorvastatin.  Will discuss timing of resuming DOAC with Dr. Ferrara and discontinue ASA when transitioned.  -Appreciate cardiology assistance, Imdur 60mg every day, [id number] resumed, holding metoprolol  -PT/OT  -Promote nutrition, high protein intake for wound healing  -Discharge to TCU once PICC is placed and bed availability, appreciate SW assistance.      Jessica Bunn, DNP, APRN, AGNP-C  Division of Vascular Surgery   [facility]  Pager: [phone]      Addendum:  Changed wound vac this afternoon.     Measurements for wound vac are - 5.5 x 2.5 x 1.5 on the left and 9 x 5 x 1.5 on the right

## 2021-04-30 NOTE — PROGRESS NOTES
Care Management Follow Up    Length of Stay (days): 7    Expected Discharge Date: 05/01/21     Concerns to be Addressed: discharge planning     Patient plan of care discussed at interdisciplinary rounds: Yes    Anticipated Discharge Disposition: Transitional Care  Disposition Comments: Pt and  in agreement with TCU recommendation for discharge.  Anticipated Discharge Services: None  Anticipated Discharge DME: None    Patient/family educated on Medicare website which has current facility and service quality ratings: no  -Not at this time - Pt specifically requesting referral to  TCU  Education Provided on the Discharge Plan: yes  Patient/Family in Agreement with the Plan: yes    Referrals Placed by CM/SW: Post Acute Facilities  Private pay costs discussed: Not applicable    Additional Information:  SW continues to follow for safe and appropriate discharge planning; TCU continues to be recommended at discharge. SALVADOR coordinated with both  TCU Liaison (Angeles) and Vascular Team (Fellow and PA). After further discussion surrounding Pt's needs [facility] has accepted pt for admission to  TCU tomorrow.     Per Vascular Surgery Pt will discharge on IV Cefazolin 2gm IV q12h for 6 weeks. Pt will start on PO Rifampin. Pt will require wound vac changes MWF. Pt will be ready for discharge tomorrow.     In preparation for discharge planning SALVADOR completed PAS. Confirmation #: [id number]    Per  TCU Liaison (Angeles) Wknd SW will need to f/u with Admissions tomorrow to determine discharge time and arrange transport. Angeles informed SALVADOR that Pt will need updated COVID test; SW paged Vascular (Jessica) and COVID test was ordered today.     SALVADOR met with Pt and updated her re: acceptance at  TCU. She is very happy they are able to take her and looks forward to discharging there this weekend. She asked that SW update her  (Zara) re: discharge plan. SALVADOR spoke with Padilla by phone and provided update on discharge status;  he would like a call when discharge confirmed tomorrow.     Addendum at 1600: SALVADOR received message from  TCU Liaison (Angeles) with concern that Pt received IV Dilaudid during wound vac change today. Typically they want to see a wound vac change without IV pain meds before they accept. SALVADOR spoke with Vascular Surgery Fellow (Dr. Salgado) who confirmed that Pt did receive the IV dilaudid but that it was not d/t pt being unable to tolerate the wound vac change with PO meds. He feels strongly Pt can be managed on PO meds and is still ready for discharge tomorrow. If the Hospitalist at [facility] needs to talk to the team they are willing to do that. SALVADOR relayed this to Angeles; Angeles will update the [facility] Weekend Liaison re: this and will f/u with their TCU Hospitalist as necessary.     SALVADOR to continue to follow and assist with discharge plan as appropriate.     EVITA Castro, LICSW  6B Intermediate Care Unit   [facility]  Phone: [phone]  Pager: [phone]

## 2021-05-01 ENCOUNTER — APPOINTMENT (OUTPATIENT)
Dept: PHYSICAL THERAPY | Facility: CLINIC | Age: 71
DRG: 252 | End: 2021-05-01
Payer: COMMERCIAL

## 2021-05-01 ENCOUNTER — APPOINTMENT (OUTPATIENT)
Dept: LAB | Facility: CLINIC | Age: 71
End: 2021-05-01
Attending: INTERNAL MEDICINE
Payer: COMMERCIAL

## 2021-05-01 LAB
BACTERIA SPEC CULT: NORMAL
Lab: NORMAL
SPECIMEN SOURCE: NORMAL

## 2021-05-01 PROCEDURE — 250N000013 HC RX MED GY IP 250 OP 250 PS 637: Performed by: DIETITIAN, REGISTERED

## 2021-05-01 PROCEDURE — 97116 GAIT TRAINING THERAPY: CPT | Mod: GP

## 2021-05-01 PROCEDURE — 250N000013 HC RX MED GY IP 250 OP 250 PS 637: Performed by: STUDENT IN AN ORGANIZED HEALTH CARE EDUCATION/TRAINING PROGRAM

## 2021-05-01 PROCEDURE — 250N000011 HC RX IP 250 OP 636: Performed by: NURSE PRACTITIONER

## 2021-05-01 PROCEDURE — 250N000013 HC RX MED GY IP 250 OP 250 PS 637: Performed by: ANESTHESIOLOGY

## 2021-05-01 PROCEDURE — 258N000003 HC RX IP 258 OP 636: Performed by: SURGERY

## 2021-05-01 PROCEDURE — 99233 SBSQ HOSP IP/OBS HIGH 50: CPT | Performed by: INTERNAL MEDICINE

## 2021-05-01 PROCEDURE — 250N000012 HC RX MED GY IP 250 OP 636 PS 637: Performed by: SURGERY

## 2021-05-01 PROCEDURE — 250N000013 HC RX MED GY IP 250 OP 250 PS 637: Performed by: SURGERY

## 2021-05-01 PROCEDURE — 120N000005 HC R&B MS OVERFLOW UMMC

## 2021-05-01 RX ADMIN — TICAGRELOR 90 MG: 90 TABLET ORAL at 08:38

## 2021-05-01 RX ADMIN — LISINOPRIL 5 MG: 5 TABLET ORAL at 08:37

## 2021-05-01 RX ADMIN — ASPIRIN 81 MG CHEWABLE TABLET 81 MG: 81 TABLET CHEWABLE at 08:53

## 2021-05-01 RX ADMIN — Medication 5 ML: at 13:03

## 2021-05-01 RX ADMIN — CEFAZOLIN SODIUM 2 G: 2 INJECTION, SOLUTION INTRAVENOUS at 21:15

## 2021-05-01 RX ADMIN — FLUTICASONE PROPIONATE 2 SPRAY: 50 SPRAY, METERED NASAL at 08:46

## 2021-05-01 RX ADMIN — ISOSORBIDE MONONITRATE 60 MG: 60 TABLET, EXTENDED RELEASE ORAL at 08:38

## 2021-05-01 RX ADMIN — MELATONIN TAB 3 MG 6 MG: 3 TAB at 21:15

## 2021-05-01 RX ADMIN — MYCOPHENOLIC ACID 360 MG: 360 TABLET, DELAYED RELEASE ORAL at 08:38

## 2021-05-01 RX ADMIN — ATORVASTATIN CALCIUM 80 MG: 40 TABLET, FILM COATED ORAL at 08:36

## 2021-05-01 RX ADMIN — SODIUM CHLORIDE: 9 INJECTION, SOLUTION INTRAVENOUS at 08:53

## 2021-05-01 RX ADMIN — PREDNISONE 5 MG: 5 TABLET ORAL at 08:38

## 2021-05-01 RX ADMIN — CEFAZOLIN SODIUM 2 G: 2 INJECTION, SOLUTION INTRAVENOUS at 08:53

## 2021-05-01 RX ADMIN — TICAGRELOR 90 MG: 90 TABLET ORAL at 21:15

## 2021-05-01 RX ADMIN — MYCOPHENOLIC ACID 360 MG: 360 TABLET, DELAYED RELEASE ORAL at 21:15

## 2021-05-01 ASSESSMENT — MIFFLIN-ST. JEOR: SCORE: 961.25

## 2021-05-01 ASSESSMENT — ACTIVITIES OF DAILY LIVING (ADL)
ADLS_ACUITY_SCORE: 16

## 2021-05-01 NOTE — PROGRESS NOTES
"VASCULAR SURGERY PROGRESS NOTE    Subjective:  Resting comfortably in bed last night.     Objective:    Intake/Output Summary (Last 24 hours) at 4/30/2021 0740  Last data filed at 4/30/2021 0500  Gross per 24 hour   Intake 1040 ml   Output 1100 ml   Net -60 ml     Labs:  ROUTINE IP LABS (Last four results)  BMP  Recent Labs   Lab 04/30/21  0453 04/29/21  0519 04/28/21  0540 04/27/21  0204    141 138 138   POTASSIUM 3.5 3.8 3.8 3.8   CHLORIDE 112* 111* 112* 110*   KAYKAY 8.0* 8.2* 8.6 8.6   CO2 20 23 21 23   BUN 35* 36* 36* 42*   CR 1.20* 1.35* 1.46* 1.68*   GLC 79 89 87 110*     CBC  Recent Labs   Lab 04/30/21  0453 04/29/21  0519 04/28/21  0540 04/27/21  0204   WBC 14.1* 14.0* 13.3* 14.1*   RBC 2.86* 2.73* 2.80* 2.91*   HGB 8.1* 7.5* 7.8* 8.1*   HCT 26.4* 24.9* 25.6* 25.3*   MCV 92 91 91 87   MCH 28.3 27.5 27.9 27.8   MCHC 30.7* 30.1* 30.5* 32.0   RDW 18.6* 18.1* 18.3* 18.4*    264 292 324     INR  Recent Labs   Lab 04/25/21  2200   INR 1.18*     PHYSICAL EXAM:  BP (!) 140/86 (BP Location: Left arm)   Pulse 99   Temp 97.5  F (36.4  C) (Oral)   Resp 18   Ht 1.575 m (5' 2\")   Wt 47.8 kg (105 lb 6.1 oz)   SpO2 94%   BMI 19.27 kg/m    General: The patient is alert and oriented. Appropriate. No acute distress  Psych: pleasant affect, answers questions appropriately  Skin: Color appropriate for race, warm, dry.  Respiratory: The patient does not require supplemental oxygen. Breathing unlabored  GI:  Abdomen soft, nontender to light palpation.  Extremities: Bilateral groin wound vacs with good suction, keri-wound skin without erythema.  Generalized old bruising in groin area.  DP/PT signals multiphasic bilaterally, feet warm and pink.          ASSESSMENT:  69 yo F s/p EVAR AUI and left to right femoral to femoral bypass graft for 5.9 cm AAA. Initial post-op course was complicated by inferior STEMI requiring emergent cardiac catheterization and PCI to RCA. Patient presenting on 4/23/2021 to ED with " subjective fevers and chills and erythema and drainage from right groin wound.      4/24/2021 - Incision and drainage of right groin wound; packing of the wound with Adaptic, moistened Kerlix gauze, ABD pad     4/25/2021 - Explantation of femoral to femoral bypass graft; femoral to femoral bypass graft using cadaveric homograft (tunneled retropubic); bilateral sartorius muscle flaps; placement of negative pressure wound vac therapy.      4/26/2021 - Cardiac catheterization for post-op EKG changes; RCA stent patent; diffuse vasospasm of LAD, LCx.  Returned to OR for bilateral groin washout and placement of Veraflo wound vac system.     4/27/2021: transfer out of ICU.      PLAN:  -Continue wound vac,   -Appreciate ID recs, continue Cefazolin 2g Q 12 hours  -Asprin, Brillinta, Atorvastatin  -Appreciate cardiology assistance, Imdur 60mg every day, [id number] resumed, holding metoprolol  -PT/OT  -Promote nutrition, high protein intake for wound healing  - Discussed with social work today plans to discharge to [facility]; On call  is to follow-up with [facility] today; tentative discharge today.     Devendra Salgado MD  Vascular Surgery     Addendum:   updated team this afternoon that [facility] will not accept patient until off fluids for 24 hours. Discontinued IVF. Will work towards transfer tomorrow ASAP.     Devendra Salgado MD  Vascular Surgery Fellow

## 2021-05-01 NOTE — PROGRESS NOTES
[facility]  Transplant Nephrology Progress Note  Date of Admission:  4/23/2021  Today's Date: 05/01/2021  Requesting physician: Raven Lewis MD    Recommendations:  - Recommend increasing lisinopril.  - Continue MPA and prednisone 5 mg     Assessment & Plan   # LDKT: Stable kidney function, now below baseline, likely due to loss of muscle mass recently, as well as IV fluids.   - Baseline Creatinine: ~ 1.3-1.6   - Proteinuria: Not checked recently   - Date DSA Last Checked: Apr/2018      Latest DSA: No   - BK Viremia: Not checked recently due to time from transplant   - Kidney Tx Biopsy: No    # Immunosuppression: Mycophenolic acid (dose 360 mg every 12 hours) and Prednisone (dose 5 mg daily)   - Changes: No    # Infection Prophylaxis:   - PJP: None    # Hypertension: Borderline control;  Goal BP: < 130/80   - Volume status: Euvolemic     - Changes: Recommend increasing lisinopril.    # Anemia in Chronic Renal Disease: Hgb: Stable, low      JONATHAN: No   - Iron studies: Not checked recently    # Mineral Bone Disorder:   - Vitamin D; level: Not checked recently        On supplement: No  - Calcium; level: Normal        On supplement: No    # Electrolytes:   - Potassium; level: Normal        On supplement: No  - Bicarbonate; level: Normal        On supplement: No    # infrarenal AAA, s/p EVAR with fem-fem bypass on 4/6 c/b infected R femoral graft s/p  Debridement. management per surgery.    # MSSA bacteremia 2/2 groin abscess and endovascular fem-fem bypass graft infection now s/p I&D, washout and new graft placement. ECHO with no vegetations.  ID following on IV cefazolin    # Transplant History:  Etiology of Kidney Failure: Alport's syndrome  Tx: LDKT  Transplant: 9/20/2004 (Kidney)  Significant changes in immunosuppression: None  Significant transplant-related complications: Recurrent Skin Cancers    Recommendations were communicated to the primary team via this  "note.    Hitesh See MD  Pager: [phone]    Interval History:  Ms. Yang's kidney function is improved with creatinine ~ 1.2.  She reports feeling okay.  Denies any chest pain or shortness of breath.  No nausea or vomiting.  Stable watery stools.  No fever, sweats or chills.  Some puffiness in her feet.    Review of Systems    4 point ROS was obtained and negative except as noted in the Interval History.     Physical Exam   Temp  Av.6  F (37  C)  Min: 97.2  F (36.2  C)  Max: 100.7  F (38.2  C)  Arterial Line BP  Min: 49/43  Max: 172/90  Arterial Line MAP (mmHg)  Av.1 mmHg  Min: 48 mmHg  Max: 131 mmHg      Pulse  Av.6  Min: 50  Max: 95 Resp  Av.6  Min: 10  Max: 40  SpO2  Av.2 %  Min: 88 %  Max: 100 %     BP (!) 159/89 (BP Location: Left arm)   Pulse 90   Temp 98.3  F (36.8  C) (Oral)   Resp 18   Ht 1.575 m (5' 2\")   Wt 47.8 kg (105 lb 6.1 oz)   SpO2 95%   BMI 19.27 kg/m     Date 21 07 - 21 0659   Shift 3769-7281 3763-5563 7620-4146 24 Hour Total   INTAKE   P.O. 100   100   I.V. 227.8   227.8   Blood Components 300   300   Shift Total(mL/kg) 627.8(13.13)   627.8(13.13)   OUTPUT   Urine 420   420   Blood 20   20   Shift Total(mL/kg) 440(9.21)   440(9.21)   Weight (kg) 47.8 47.8 47.8 47.8      Admit Weight: 46.2 kg (101 lb 14.4 oz)     GENERAL APPEARANCE: alert and no distress  RESP: lungs clear to auscultation - no rales, rhonchi or wheezes  CV: regular rhythm, normal rate, no rub, no murmur  EDEMA: trace to 1+ LE edema bilaterally  ABDOMEN: soft, ND  MS: extremities normal - no gross deformities noted, no evidence of inflammation in joints, no muscle tenderness  SKIN: no rash  KIDNEY TX: normal    Data   CMP  Recent Labs   Lab 21  0453 21  0519 21  0540 21  0830 21  0204 21  1328 21  0459    141 138  --  138 138 140   POTASSIUM 3.5 3.8 3.8  --  3.8 4.1 4.4   CHLORIDE 112* 111* 112*  --  110* 111* 112*   CO2 20 23 " 21  --  23 18* 18*   ANIONGAP 8 7 5  --  5 10 11   GLC 79 89 87  --  110* 146* 138*   BUN 35* 36* 36*  --  42* 41* 38*   CR 1.20* 1.35* 1.46*  --  1.68* 1.58* 1.51*   GFRESTIMATED 46* 40* 37*  --  31* 33* 35*   GFRESTBLACK 54* 47* 42*  --  36* 38* 41*   KAYKAY 8.0* 8.2* 8.6  --  8.6 8.4* 8.4*   MAG  --   --  2.7* 2.8* 3.2* 1.9 2.0   PHOS  --   --  2.8  --  3.4 4.5 4.6*     CBC  Recent Labs   Lab 04/30/21  0453 04/29/21  0519 04/28/21  0540 04/27/21  0204   HGB 8.1* 7.5* 7.8* 8.1*   WBC 14.1* 14.0* 13.3* 14.1*   RBC 2.86* 2.73* 2.80* 2.91*   HCT 26.4* 24.9* 25.6* 25.3*   MCV 92 91 91 87   MCH 28.3 27.5 27.9 27.8   MCHC 30.7* 30.1* 30.5* 32.0   RDW 18.6* 18.1* 18.3* 18.4*    264 292 324     INR  Recent Labs   Lab 04/25/21  2200   INR 1.18*   PTT 26     ABG  Recent Labs   Lab 04/25/21 2011 04/25/21  1912 04/24/21  1313   PH 7.30* 7.32* 7.46*   PCO2 43 39 29*   PO2 110* 124* 58*   HCO3 21 20* 21   O2PER 0.44 43.0 30.0      Urine Studies  Recent Labs   Lab Test 01/20/14  0841   COLOR Light Yellow   APPEARANCE Clear   URINEGLC Negative   URINEBILI Negative   URINEKETONE Negative   SG 1.013   UBLD Negative   URINEPH 5.0   PROTEIN Negative   NITRITE Negative   LEUKEST Moderate*   RBCU 1   WBCU 2     Recent Labs   Lab Test 04/24/18  1812 01/31/17  1030 06/03/16  0900 10/22/15  1024 06/25/15  1007 01/14/15  0902 04/07/14  0921   UTPG 1.04* 1.67* 0.76* 0.44* 0.31* 0.27* 0.05     PTH  Recent Labs   Lab Test 06/12/19  1810   PTHI 89*     Iron Studies  No lab results found.    IMAGING:  All imaging studies reviewed by me.

## 2021-05-01 NOTE — PROGRESS NOTES
Care Management Follow Up    Length of Stay (days): 8    Expected Discharge Date: 05/01/21     Concerns to be Addressed: discharge planning     Patient plan of care discussed at interdisciplinary rounds: No    Anticipated Discharge Disposition: Transitional Care  Disposition Comments: Pt and  in agreement with TCU recommendation for discharge.  Anticipated Discharge Services: None  Anticipated Discharge DME: None Wheel Chair Ride via [employer] [phone] for  at 1pm on 5/2.    Patient/family educated on Medicare website which has current facility and service quality ratings: no(Not at this time - Pt specifically requesting referral to  TCU)  Education Provided on the Discharge Plan:    Patient/Family in Agreement with the Plan: yes    Referrals Placed by CM/SW: Post Acute Facilities      Additional Information:  SALVADOR informed by Gina in [facility] that pt needs to be off of IV fluids and they will look at having her admit to TCU on Sunday, 5/2.     SALVADOR Arranged [facility] WC Ride for pt with  at 1pm on May 2. SALVADOR Left voicemail message with Gina at [facility].    SALVADOR con't to follow.    SALONI Flores, MSW      Saturday     Text paging available through [employer] on [email or web address] - search SOCIAL WORK     ON CALL PAGER [phone] - [phone]  ON CALL COVERAGE AFTER 1600 [phone]      EVITA Gracia

## 2021-05-01 NOTE — PLAN OF CARE
"BP (!) 160/89 (BP Location: Left arm, Cuff Size: Adult Small)   Pulse 82   Temp 96.8  F (36  C) (Axillary)   Resp 18   Ht 1.575 m (5' 2\")   Wt 46.8 kg (103 lb 2.8 oz)   SpO2 96%   BMI 18.87 kg/m      Hours of Care: 07:30 - 19:30  Reason for admission: Infection at BL groin surgical sites    Neuro: A/Ox4  Cardiac: SR, SBA 130s-160.?  Respiratory: Sating >95 on RA. No SOB.  GI/: Voiding adequately via commode, incontinent x2  Diet: Tolerating regular diet, appetite poor  Pain: prn Tylenol given x1 for mild headache, with good relief.  Activity: Assist of 2 with GB and [place] [facility]  LDAs:  R PICC placed today: NS @ 50 + TKO. RPIV: SL. Wound vac to BL groin incision sites.  Skin/Wounds: No new deficits noted. Wound vac dressing changed by surgerry this AM. Mepilex to coccyx changed.  ?    Plan: Transfer to TCU tomorrow 5/1  ??  Continue to monitor and follow POC  Jazmyn Hodge RN on 4/30/2021 at 7:57 PM   ?    "

## 2021-05-02 ENCOUNTER — APPOINTMENT (OUTPATIENT)
Dept: ULTRASOUND IMAGING | Facility: CLINIC | Age: 71
DRG: 252 | End: 2021-05-02
Attending: STUDENT IN AN ORGANIZED HEALTH CARE EDUCATION/TRAINING PROGRAM
Payer: COMMERCIAL

## 2021-05-02 ENCOUNTER — HOSPITAL ENCOUNTER (INPATIENT)
Facility: SKILLED NURSING FACILITY | Age: 71
LOS: 21 days | Discharge: HOME HEALTH | DRG: 919 | End: 2021-05-23
Attending: INTERNAL MEDICINE | Admitting: INTERNAL MEDICINE
Payer: COMMERCIAL

## 2021-05-02 VITALS
HEIGHT: 62 IN | DIASTOLIC BLOOD PRESSURE: 79 MMHG | OXYGEN SATURATION: 97 % | HEART RATE: 75 BPM | RESPIRATION RATE: 18 BRPM | BODY MASS INDEX: 19.35 KG/M2 | SYSTOLIC BLOOD PRESSURE: 134 MMHG | WEIGHT: 105.16 LBS | TEMPERATURE: 98.5 F

## 2021-05-02 DIAGNOSIS — I21.3 ST ELEVATION MYOCARDIAL INFARCTION (STEMI), UNSPECIFIED ARTERY (H): ICD-10-CM

## 2021-05-02 DIAGNOSIS — Z79.899 IMMUNOSUPPRESSIVE MANAGEMENT ENCOUNTER FOLLOWING KIDNEY TRANSPLANT: ICD-10-CM

## 2021-05-02 DIAGNOSIS — Z94.0 KIDNEY REPLACED BY TRANSPLANT: ICD-10-CM

## 2021-05-02 DIAGNOSIS — J31.0 CHRONIC RHINITIS: ICD-10-CM

## 2021-05-02 DIAGNOSIS — I21.11 ST ELEVATION MYOCARDIAL INFARCTION INVOLVING RIGHT CORONARY ARTERY (H): ICD-10-CM

## 2021-05-02 DIAGNOSIS — I71.40 ABDOMINAL AORTIC ANEURYSM (AAA) WITHOUT RUPTURE (H): ICD-10-CM

## 2021-05-02 DIAGNOSIS — Z48.298 AFTERCARE FOLLOWING ORGAN TRANSPLANT: ICD-10-CM

## 2021-05-02 DIAGNOSIS — Z94.0 IMMUNOSUPPRESSIVE MANAGEMENT ENCOUNTER FOLLOWING KIDNEY TRANSPLANT: ICD-10-CM

## 2021-05-02 DIAGNOSIS — I73.9 PERIPHERAL ARTERY DISEASE (H): ICD-10-CM

## 2021-05-02 DIAGNOSIS — T81.49XA WOUND INFECTION AFTER SURGERY: ICD-10-CM

## 2021-05-02 DIAGNOSIS — I20.1 CORONARY ARTERY VASOSPASM (H): ICD-10-CM

## 2021-05-02 DIAGNOSIS — T14.8XXA HEMATOMA: Primary | ICD-10-CM

## 2021-05-02 DIAGNOSIS — R53.81 PHYSICAL DECONDITIONING: ICD-10-CM

## 2021-05-02 DIAGNOSIS — Z94.0 KIDNEY TRANSPLANTED: ICD-10-CM

## 2021-05-02 LAB
BACTERIA SPEC CULT: NORMAL
BACTERIA SPEC CULT: NORMAL
Lab: NORMAL
Lab: NORMAL
SPECIMEN SOURCE: NORMAL
SPECIMEN SOURCE: NORMAL

## 2021-05-02 PROCEDURE — 250N000013 HC RX MED GY IP 250 OP 250 PS 637: Performed by: STUDENT IN AN ORGANIZED HEALTH CARE EDUCATION/TRAINING PROGRAM

## 2021-05-02 PROCEDURE — 250N000011 HC RX IP 250 OP 636: Performed by: INTERNAL MEDICINE

## 2021-05-02 PROCEDURE — 120N000009 HC R&B SNF

## 2021-05-02 PROCEDURE — 93971 EXTREMITY STUDY: CPT | Mod: 26 | Performed by: RADIOLOGY

## 2021-05-02 PROCEDURE — 93971 EXTREMITY STUDY: CPT | Mod: LT

## 2021-05-02 PROCEDURE — 258N000003 HC RX IP 258 OP 636

## 2021-05-02 PROCEDURE — 250N000013 HC RX MED GY IP 250 OP 250 PS 637: Performed by: INTERNAL MEDICINE

## 2021-05-02 PROCEDURE — 250N000013 HC RX MED GY IP 250 OP 250 PS 637: Performed by: DIETITIAN, REGISTERED

## 2021-05-02 PROCEDURE — 250N000011 HC RX IP 250 OP 636: Performed by: NURSE PRACTITIONER

## 2021-05-02 PROCEDURE — 250N000012 HC RX MED GY IP 250 OP 636 PS 637: Performed by: SURGERY

## 2021-05-02 PROCEDURE — 250N000012 HC RX MED GY IP 250 OP 636 PS 637: Performed by: INTERNAL MEDICINE

## 2021-05-02 PROCEDURE — 250N000013 HC RX MED GY IP 250 OP 250 PS 637: Performed by: SURGERY

## 2021-05-02 PROCEDURE — 99233 SBSQ HOSP IP/OBS HIGH 50: CPT | Performed by: INTERNAL MEDICINE

## 2021-05-02 RX ORDER — ACETAMINOPHEN 325 MG/1
650 TABLET ORAL EVERY 4 HOURS PRN
Status: CANCELLED | OUTPATIENT
Start: 2021-05-02

## 2021-05-02 RX ORDER — NALOXONE HYDROCHLORIDE 0.4 MG/ML
0.4 INJECTION, SOLUTION INTRAMUSCULAR; INTRAVENOUS; SUBCUTANEOUS
Status: DISCONTINUED | OUTPATIENT
Start: 2021-05-02 | End: 2021-05-23 | Stop reason: HOSPADM

## 2021-05-02 RX ORDER — ONDANSETRON 2 MG/ML
4 INJECTION INTRAMUSCULAR; INTRAVENOUS EVERY 6 HOURS PRN
Status: CANCELLED | OUTPATIENT
Start: 2021-05-02

## 2021-05-02 RX ORDER — ASPIRIN 81 MG/1
81 TABLET, CHEWABLE ORAL DAILY
Status: DISCONTINUED | OUTPATIENT
Start: 2021-05-03 | End: 2021-05-23 | Stop reason: HOSPADM

## 2021-05-02 RX ORDER — ONDANSETRON 4 MG/1
4 TABLET, ORALLY DISINTEGRATING ORAL EVERY 6 HOURS PRN
Status: CANCELLED | OUTPATIENT
Start: 2021-05-02

## 2021-05-02 RX ORDER — HEPARIN SODIUM,PORCINE 10 UNIT/ML
5-10 VIAL (ML) INTRAVENOUS
Status: CANCELLED | OUTPATIENT
Start: 2021-05-02

## 2021-05-02 RX ORDER — ASPIRIN 81 MG/1
81 TABLET, CHEWABLE ORAL DAILY
Status: CANCELLED | OUTPATIENT
Start: 2021-05-03

## 2021-05-02 RX ORDER — ACETAMINOPHEN 325 MG/1
650 TABLET ORAL EVERY 4 HOURS PRN
Status: DISCONTINUED | OUTPATIENT
Start: 2021-05-02 | End: 2021-05-14

## 2021-05-02 RX ORDER — PREDNISONE 5 MG/1
5 TABLET ORAL DAILY
Status: CANCELLED | OUTPATIENT
Start: 2021-05-03

## 2021-05-02 RX ORDER — NALOXONE HYDROCHLORIDE 0.4 MG/ML
0.2 INJECTION, SOLUTION INTRAMUSCULAR; INTRAVENOUS; SUBCUTANEOUS
Status: DISCONTINUED | OUTPATIENT
Start: 2021-05-02 | End: 2021-05-23 | Stop reason: HOSPADM

## 2021-05-02 RX ORDER — NALOXONE HYDROCHLORIDE 0.4 MG/ML
0.2 INJECTION, SOLUTION INTRAMUSCULAR; INTRAVENOUS; SUBCUTANEOUS
Status: CANCELLED | OUTPATIENT
Start: 2021-05-02

## 2021-05-02 RX ORDER — HEPARIN SODIUM,PORCINE 10 UNIT/ML
5-10 VIAL (ML) INTRAVENOUS EVERY 24 HOURS
Status: DISCONTINUED | OUTPATIENT
Start: 2021-05-03 | End: 2021-05-23 | Stop reason: HOSPADM

## 2021-05-02 RX ORDER — ONDANSETRON 4 MG/1
4 TABLET, ORALLY DISINTEGRATING ORAL EVERY 6 HOURS PRN
Status: DISCONTINUED | OUTPATIENT
Start: 2021-05-02 | End: 2021-05-02

## 2021-05-02 RX ORDER — ONDANSETRON 2 MG/ML
4 INJECTION INTRAMUSCULAR; INTRAVENOUS EVERY 6 HOURS PRN
Status: DISCONTINUED | OUTPATIENT
Start: 2021-05-02 | End: 2021-05-23 | Stop reason: HOSPADM

## 2021-05-02 RX ORDER — PREDNISONE 5 MG/1
5 TABLET ORAL DAILY
Status: DISCONTINUED | OUTPATIENT
Start: 2021-05-03 | End: 2021-05-23 | Stop reason: HOSPADM

## 2021-05-02 RX ORDER — MYCOPHENOLIC ACID 360 MG/1
360 TABLET, DELAYED RELEASE ORAL 2 TIMES DAILY
Status: CANCELLED | OUTPATIENT
Start: 2021-05-02

## 2021-05-02 RX ORDER — LIDOCAINE 40 MG/G
CREAM TOPICAL
Status: CANCELLED | OUTPATIENT
Start: 2021-05-02

## 2021-05-02 RX ORDER — OXYCODONE HYDROCHLORIDE 5 MG/1
5 TABLET ORAL EVERY 4 HOURS PRN
Status: CANCELLED | OUTPATIENT
Start: 2021-05-02

## 2021-05-02 RX ORDER — NALOXONE HYDROCHLORIDE 0.4 MG/ML
0.4 INJECTION, SOLUTION INTRAMUSCULAR; INTRAVENOUS; SUBCUTANEOUS
Status: CANCELLED | OUTPATIENT
Start: 2021-05-02

## 2021-05-02 RX ORDER — CEFAZOLIN SODIUM 2 G/100ML
2 INJECTION, SOLUTION INTRAVENOUS EVERY 12 HOURS
Status: CANCELLED | OUTPATIENT
Start: 2021-05-02

## 2021-05-02 RX ORDER — OXYCODONE HYDROCHLORIDE 5 MG/1
5 TABLET ORAL EVERY 6 HOURS PRN
Status: DISCONTINUED | OUTPATIENT
Start: 2021-05-02 | End: 2021-05-14

## 2021-05-02 RX ORDER — ISOSORBIDE MONONITRATE 60 MG/1
60 TABLET, EXTENDED RELEASE ORAL DAILY
Status: DISCONTINUED | OUTPATIENT
Start: 2021-05-03 | End: 2021-05-23 | Stop reason: HOSPADM

## 2021-05-02 RX ORDER — MYCOPHENOLIC ACID 360 MG/1
360 TABLET, DELAYED RELEASE ORAL 2 TIMES DAILY
Status: DISCONTINUED | OUTPATIENT
Start: 2021-05-02 | End: 2021-05-23 | Stop reason: HOSPADM

## 2021-05-02 RX ORDER — HEPARIN SODIUM,PORCINE 10 UNIT/ML
2-5 VIAL (ML) INTRAVENOUS
Status: CANCELLED | OUTPATIENT
Start: 2021-05-02 | End: 2021-05-03

## 2021-05-02 RX ORDER — METHOCARBAMOL 500 MG/1
500 TABLET, FILM COATED ORAL 4 TIMES DAILY PRN
Status: DISCONTINUED | OUTPATIENT
Start: 2021-05-02 | End: 2021-05-23 | Stop reason: HOSPADM

## 2021-05-02 RX ORDER — LISINOPRIL 5 MG/1
5 TABLET ORAL DAILY
Status: DISCONTINUED | OUTPATIENT
Start: 2021-05-03 | End: 2021-05-03

## 2021-05-02 RX ORDER — LISINOPRIL 5 MG/1
5 TABLET ORAL DAILY
Status: CANCELLED | OUTPATIENT
Start: 2021-05-03

## 2021-05-02 RX ORDER — LIDOCAINE 4 G/G
1 PATCH TOPICAL EVERY 24 HOURS
Status: DISCONTINUED | OUTPATIENT
Start: 2021-05-02 | End: 2021-05-22

## 2021-05-02 RX ORDER — ONDANSETRON 4 MG/1
4 TABLET, ORALLY DISINTEGRATING ORAL EVERY 6 HOURS PRN
Status: DISCONTINUED | OUTPATIENT
Start: 2021-05-02 | End: 2021-05-23 | Stop reason: HOSPADM

## 2021-05-02 RX ORDER — FLUTICASONE PROPIONATE 50 MCG
2 SPRAY, SUSPENSION (ML) NASAL DAILY
Status: CANCELLED | OUTPATIENT
Start: 2021-05-03

## 2021-05-02 RX ORDER — ATORVASTATIN CALCIUM 80 MG/1
80 TABLET, FILM COATED ORAL DAILY
Status: CANCELLED | OUTPATIENT
Start: 2021-05-03

## 2021-05-02 RX ORDER — CEFAZOLIN SODIUM 2 G/100ML
2 INJECTION, SOLUTION INTRAVENOUS EVERY 12 HOURS
Status: DISCONTINUED | OUTPATIENT
Start: 2021-05-02 | End: 2021-05-23 | Stop reason: HOSPADM

## 2021-05-02 RX ORDER — HEPARIN SODIUM,PORCINE 10 UNIT/ML
5-10 VIAL (ML) INTRAVENOUS
Status: DISCONTINUED | OUTPATIENT
Start: 2021-05-02 | End: 2021-05-23 | Stop reason: HOSPADM

## 2021-05-02 RX ORDER — LANOLIN ALCOHOL/MO/W.PET/CERES
6 CREAM (GRAM) TOPICAL AT BEDTIME
Status: CANCELLED | OUTPATIENT
Start: 2021-05-02

## 2021-05-02 RX ORDER — HEPARIN SODIUM,PORCINE 10 UNIT/ML
5-10 VIAL (ML) INTRAVENOUS EVERY 24 HOURS
Status: CANCELLED | OUTPATIENT
Start: 2021-05-03

## 2021-05-02 RX ORDER — ISOSORBIDE MONONITRATE 30 MG/1
60 TABLET, EXTENDED RELEASE ORAL DAILY
Status: CANCELLED | OUTPATIENT
Start: 2021-05-03

## 2021-05-02 RX ORDER — SODIUM CHLORIDE 9 MG/ML
INJECTION, SOLUTION INTRAVENOUS
Status: COMPLETED
Start: 2021-05-02 | End: 2021-05-02

## 2021-05-02 RX ORDER — LIDOCAINE 40 MG/G
CREAM TOPICAL
Status: CANCELLED | OUTPATIENT
Start: 2021-05-02 | End: 2021-05-03

## 2021-05-02 RX ORDER — ATORVASTATIN CALCIUM 40 MG/1
80 TABLET, FILM COATED ORAL DAILY
Status: DISCONTINUED | OUTPATIENT
Start: 2021-05-03 | End: 2021-05-23 | Stop reason: HOSPADM

## 2021-05-02 RX ADMIN — Medication 5 ML: at 12:43

## 2021-05-02 RX ADMIN — ATORVASTATIN CALCIUM 80 MG: 40 TABLET, FILM COATED ORAL at 08:10

## 2021-05-02 RX ADMIN — PREDNISONE 5 MG: 5 TABLET ORAL at 08:10

## 2021-05-02 RX ADMIN — TICAGRELOR 90 MG: 90 TABLET ORAL at 20:04

## 2021-05-02 RX ADMIN — ASPIRIN 81 MG CHEWABLE TABLET 81 MG: 81 TABLET CHEWABLE at 08:11

## 2021-05-02 RX ADMIN — ISOSORBIDE MONONITRATE 60 MG: 60 TABLET, EXTENDED RELEASE ORAL at 08:12

## 2021-05-02 RX ADMIN — LISINOPRIL 5 MG: 5 TABLET ORAL at 08:11

## 2021-05-02 RX ADMIN — TICAGRELOR 90 MG: 90 TABLET ORAL at 08:11

## 2021-05-02 RX ADMIN — LIDOCAINE 1 PATCH: 560 PATCH PERCUTANEOUS; TOPICAL; TRANSDERMAL at 20:02

## 2021-05-02 RX ADMIN — MYCOPHENOLIC ACID 360 MG: 360 TABLET, DELAYED RELEASE ORAL at 08:10

## 2021-05-02 RX ADMIN — SODIUM CHLORIDE 500 ML: 9 INJECTION, SOLUTION INTRAVENOUS at 20:01

## 2021-05-02 RX ADMIN — CEFAZOLIN SODIUM 2 G: 2 INJECTION, SOLUTION INTRAVENOUS at 08:15

## 2021-05-02 RX ADMIN — CALCIUM CARBONATE 600 MG (1,500 MG)-VITAMIN D3 400 UNIT TABLET 1 TABLET: at 20:04

## 2021-05-02 RX ADMIN — CEFAZOLIN SODIUM 2 G: 2 INJECTION, SOLUTION INTRAVENOUS at 20:01

## 2021-05-02 RX ADMIN — MYCOPHENOLIC ACID 360 MG: 360 TABLET, DELAYED RELEASE ORAL at 20:04

## 2021-05-02 RX ADMIN — FLUTICASONE PROPIONATE 2 SPRAY: 50 SPRAY, METERED NASAL at 08:09

## 2021-05-02 ASSESSMENT — ACTIVITIES OF DAILY LIVING (ADL)
ADLS_ACUITY_SCORE: 16
DOING_ERRANDS_INDEPENDENTLY_DIFFICULTY: YES
DRESSING/BATHING: BATHING DIFFICULTY, ASSISTANCE 1 PERSON
FALL_HISTORY_WITHIN_LAST_SIX_MONTHS: NO
VISION_MANAGEMENT: READING GLASSES
ADLS_ACUITY_SCORE: 16
WALKING_OR_CLIMBING_STAIRS: AMBULATION DIFFICULTY, ASSISTANCE 1 PERSON;STAIR CLIMBING DIFFICULTY, DEPENDENT;TRANSFERRING DIFFICULTY, ASSISTANCE 1 PERSON
EQUIPMENT_CURRENTLY_USED_AT_HOME: WALKER, STANDARD
WALKING_OR_CLIMBING_STAIRS_DIFFICULTY: YES
WEAR_GLASSES_OR_BLIND: YES
DRESSING/BATHING_DIFFICULTY: YES
HEARING_DIFFICULTY_OR_DEAF: NO
PATIENT_/_FAMILY_COMMUNICATION_STYLE: SPOKEN LANGUAGE (ENGLISH OR BILINGUAL)
CONCENTRATING,_REMEMBERING_OR_MAKING_DECISIONS_DIFFICULTY: NO
ADLS_ACUITY_SCORE: 16
ADLS_ACUITY_SCORE: 16
DIFFICULTY_EATING/SWALLOWING: NO
TOILETING_ASSISTANCE: TOILETING DIFFICULTY, ASSISTANCE 1 PERSON
DIFFICULTY_COMMUNICATING: NO
TOILETING_ISSUES: YES

## 2021-05-02 ASSESSMENT — MIFFLIN-ST. JEOR: SCORE: 960.25

## 2021-05-02 NOTE — PROGRESS NOTES
Care Management Discharge Note    Discharge Date: 05/02/21 at 1300     Discharge Disposition: Transitional Care  [facility] - 4th floor   [address] [address]  Ph: [phone] *PLEASE CALL FOR RN TO RN REPORT*    Discharge Services: None    Discharge DME: None    Discharge Transportation: agency, [facility], scheduled for today at 1300    Private pay costs discussed: Not applicable    PAS Confirmation Code: [id number]  Patient/family educated on Medicare website which has current facility and service quality ratings: yes    Education Provided on the Discharge Plan:    Persons Notified of Discharge Plans: Attending MD, bedside RN, charge RN, pt, pt's daughter Alexi ([phone]), [facility] liaision.  Patient/Family in Agreement with the Plan: yes    Handoff Referral Completed: Yes    Additional Information:  MD confirms pt is ready for discharge. SW confirmed with [facility] liaison they can accept pt today. W/c ride confirmed with [employer] ([phone]) for today at 1300. SW updated pt at bedside; she confirms her dtr Alexi is her designated visitor to TCU. Orders have been signed; no scripts needed due to going to TCU.  Daughter Alexi accepted phone number and address oh TCU for further questions.        Elvie Miller, [facility]  Weekend Adult Acute Care     For weekend social work needs, contact information below and available on [employer]:  4A, 4C, 4E, 5A, 5B                  pager [phone]  6A, 6B, 6C                               pager [phone]  7A, 7B, 7C, 7D, 5C                 pager [phone]  ED/OBS                                  pager [phone]     For weekend RN care coordinator needs (home discharge with needs including home care, rides home, assisted living facility returns, durable medical equip, IV antibiotics) - page [phone].     For hours 1600 - midnight Pager: [phone]

## 2021-05-02 NOTE — PROGRESS NOTES
No significant events overnight. Patient has a negative assessment. Room air. Vitals stable. Infection and incision site are clean dry and intact. Patient roughly had 200 ml of output today from surgical sites. Patient is able to independently turn self in bed. Patient is up with 2. Room air. Continue plan of care.
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
## 2021-05-02 NOTE — PROGRESS NOTES
Care Management Follow Up    Length of Stay (days): 9    Expected Discharge Date: 05/02/21     Concerns to be Addressed: discharge planning     Patient plan of care discussed at interdisciplinary rounds: No due to weekend coverage    Anticipated Discharge Disposition: Transitional Care  Disposition Comments: Pt and  in agreement with TCU recommendation for discharge.  Anticipated Discharge Services: None  Anticipated Discharge DME: None    Patient/family educated on Medicare website which has current facility and service quality ratings: no(Not at this time - Pt specifically requesting referral to [facility])  Education Provided on the Discharge Plan:    Patient/Family in Agreement with the Plan: yes    Referrals Placed by CM/SW: Post Acute Facilities  Private pay costs discussed: Not applicable    Additional Information:  SALVADOR paged by [facility] liaison, inquiring if pt was ready to discharge today (per note review, pt has w/c ride with [employer]  set for today at 1300). SALVADOR spoke with bedside RN, who confirms pt has not had IV for 24 hours. SALVADOR paged MD (Vascular Surgery, Said Damian LEONARD) to request orders and confirm if pt is ready for discharge. SALVADOR awaits return call.   -------------------------------------------------------------------------------------------------------------    ADDENDUM AT 10:15  SALVADOR spoke with MD who confirms pt is ready for discharge to [facility]. MD will place orders by 11:00am. SALVADOR will update [facility] admissions.          Elvie Miller, [facility]  Weekend Adult Acute Care     For weekend social work needs, contact information below and available on [facility]:  4A, 4C, 4E, 5A, 5B                  pager [phone]  6A, 6B, 6C                               pager [phone]  7A, 7B, 7C, 7D, 5C                 pager [phone]  ED/OBS                                  pager [phone]     For weekend RN care coordinator needs (home discharge with needs including home care, rides home,  assisted living facility returns, durable medical equip, IV antibiotics) - page [phone].     For hours 1600 - midnight Pager: [phone]

## 2021-05-02 NOTE — PROGRESS NOTES
DISCHARGE                         5/2/2021  1:11 PM  ----------------------------------------------------------------------------  Discharged to: [facility]  Via: EMS  Discharge Instructions: AVS and Discharge packet sent with pt.  Belongings: All sent with pt  IV: R DL PICC: Heparin locked. PIV: SL  Telemetry: d/c'd    Pt A&Ox4, VSS, Sat >96 on RA. Wound vac in place / patent. Denies pain. Report given to Tessa VARELA

## 2021-05-02 NOTE — PLAN OF CARE
VS:       Pt A/O X 4. Afebrile. VSS. Lungs- clear all  lobes bilaterally with both anterior and dimminshed posterior. IS encouraged. Denies nausea, shortness of breath, and chest pain.     Output:       Bowels- active in all four quadrants. Voids spontaneously without   difficulty and had a BM.      Activity:       Pt up to the bathroom with assist x 2 using a walker x2     Skin:   Non blanchable , blanchable redness noted on coccyx area      Pain:        denied having pain and discomfort      CMS:       CMS  intact. Denies numbness and tingling in all extremities. Edema noted BLE applied TubiGrip       Dressing:       Wound vac dressing was CDI at 125mmhg  Coccyx area dressing.     Diet:       Pt is on a regular diet and appetite was good this shift.       LDA:       Wound vac, PICC line. And removed PIV     Equipment:       Wound vac machine, walker . Bilateral heels are elevated off the bed.     Plan:       Pt is able to make needs known and the call light is within the pt's reach. Continue to monitor.       Additional Info:                  Patient's most recent vital signs are:     Vital signs:  BP: 104/81  Temp: 98.2  HR: 94  RR: 18  SpO2: 96 %     Patient doesn't have new respiratory symptoms.  Patient doesn't  have new sore throat.  Patient doesn't  have a fever greater than 99.5.

## 2021-05-02 NOTE — DISCHARGE SUMMARY
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  4/25/2021: Axplantation infected graft, femoral to femoral bypass with cadaveric artery, bilateral SATORIUS MUSCLE FLAP CREATION, evacuation of abscess, vacuum closure  4/25/2021: Coronary angiogram  4/26/2021: Wound exploration with debridement and irrigation and Veraflow dressing placement          Medications Prior to Admission:     Medications Prior to Admission   Medication Sig Dispense Refill Last Dose     acetaminophen (TYLENOL) 325 MG tablet Take 2 tablets (650 mg) by mouth every 4 hours as needed for other (For optimal non-opioid multimodal pain management to improve pain control.)   4/30/2021 at Unknown time     calcium carbonate 600 mg-vitamin D 400 units (CALTRATE) 600-400 MG-UNIT per tablet Take 1 tablet by mouth 2 times daily        Lactobacillus-Inulin ([facility] DIGESTIVE HEALTH) CAPS TAKE ONE CAPSULE BY MOUTH EVERY DAY (DUE FOR PHYSICAL IN MARCH) 90 capsule 3      metoprolol tartrate (LOPRESSOR) 100 MG tablet Take 1 tablet (100 mg) by mouth 2 times daily 180 tablet 1      mycophenolic acid (GENERIC EQUIVALENT) 360 MG EC tablet Take 1 tablet (360 mg) by mouth 2 times daily 60 tablet 1      oxyCODONE (ROXICODONE) 5 MG tablet Take 1 tablet (5 mg) by mouth every 6 hours as needed for moderate to severe pain 12 tablet 0      predniSONE (DELTASONE) 5 MG tablet Take 1 tablet (5 mg) by mouth daily 90 tablet 0       aspirin (ASA) 81 MG chewable tablet Take 1 tablet (81 mg) by mouth daily        atorvastatin (LIPITOR) 80 MG tablet Take 1 tablet (80 mg) by mouth daily 60 tablet 4      lidocaine (LIDODERM) 5 % patch Place 1 patch onto the skin every 24 hours To prevent lidocaine toxicity, patient should be patch free for 12 hrs daily.    Apply to low back as needed for back pain 5 patch 0      lisinopril (ZESTRIL) 5 MG tablet Take 1 tablet (5 mg) by mouth daily 60 tablet 4      ticagrelor (BRILINTA) 90 MG tablet Take 1 tablet (90 mg) by mouth every 12 hours 60 tablet 4      [DISCONTINUED] amoxicillin (AMOXIL) 500 MG capsule Take 4 capsules (2,000 mg) by mouth once as needed Prior to dental procedures 16 capsule 3              Discharge Medications:     Current Discharge Medication List      START taking these medications    Details   ceFAZolin (ANCEF) intermittent infusion 2 g in 100 mL dextrose PRE-MIX Inject 100 mLs (2 g) into the vein every 12 hours  Qty:      Associated Diagnoses: Coronary artery vasospasm (H); Abdominal aortic aneurysm (AAA) without rupture (H); ST elevation myocardial infarction involving right coronary artery (H); Wound infection after surgery      !! heparin lock flush 10 UNIT/ML SOLN injection 5-10 mLs by Intracatheter route every 24 hours  Qty:      Associated Diagnoses: Wound infection after surgery      !! heparin lock flush 10 UNIT/ML SOLN injection 5-10 mLs by Intracatheter route every hour as needed for other (to lock each CVC - Open Ended (Tunneled and Non-Tunneled) dormant lumen. AFTER medication or blood with MAX: 5 mL per lumen.)  Qty:      Associated Diagnoses: Wound infection after surgery      isosorbide mononitrate (IMDUR) 60 MG 24 hr tablet Take 1 tablet (60 mg) by mouth daily  Qty:      Associated Diagnoses: Coronary artery vasospasm (H); Abdominal aortic aneurysm (AAA) without rupture (H); ST elevation myocardial infarction involving right coronary artery (H); Wound infection after  surgery      ondansetron (ZOFRAN) 2 MG/ML SOLN injection Inject 2 mLs (4 mg) into the vein every 6 hours as needed for nausea or vomiting  Qty:      Associated Diagnoses: Coronary artery vasospasm (H); Abdominal aortic aneurysm (AAA) without rupture (H); ST elevation myocardial infarction involving right coronary artery (H); Wound infection after surgery      ondansetron (ZOFRAN-ODT) 4 MG ODT tab Take 1 tablet (4 mg) by mouth every 6 hours as needed for nausea or vomiting  Qty:      Associated Diagnoses: Coronary artery vasospasm (H); Abdominal aortic aneurysm (AAA) without rupture (H); ST elevation myocardial infarction involving right coronary artery (H); Wound infection after surgery      !! sodium chloride, PF, 0.9% PF flush 3 mLs by Intracatheter route every 8 hours  Qty:      Associated Diagnoses: Wound infection after surgery      !! sodium chloride, PF, 0.9% PF flush 5-50 mLs by Intracatheter route once as needed for line flush (to flush each lumen with line placement)  Qty:      Associated Diagnoses: Wound infection after surgery       !! - Potential duplicate medications found. Please discuss with provider.      CONTINUE these medications which have CHANGED    Details   methocarbamol (ROBAXIN) 500 MG tablet Take 1 tablet (500 mg) by mouth 4 times daily as needed for muscle spasms  Qty: 12 tablet, Refills: 0    Associated Diagnoses: Abdominal aortic aneurysm (AAA) without rupture (H); Peripheral artery disease (H); ST elevation myocardial infarction involving right coronary artery (H); Bilateral low back pain with left-sided sciatica, unspecified chronicity         CONTINUE these medications which have NOT CHANGED    Details   acetaminophen (TYLENOL) 325 MG tablet Take 2 tablets (650 mg) by mouth every 4 hours as needed for other (For optimal non-opioid multimodal pain management to improve pain control.)    Associated Diagnoses: Abdominal aortic aneurysm (AAA) without rupture (H); ST elevation myocardial  infarction (STEMI), unspecified artery (H); Peripheral artery disease (H)      calcium carbonate 600 mg-vitamin D 400 units (CALTRATE) 600-400 MG-UNIT per tablet Take 1 tablet by mouth 2 times daily      Lactobacillus-Inulin ([facility] DIGESTIVE HEALTH) CAPS TAKE ONE CAPSULE BY MOUTH EVERY DAY (DUE FOR PHYSICAL IN MARCH)  Qty: 90 capsule, Refills: 3    Associated Diagnoses: Diarrhea, unspecified type      metoprolol tartrate (LOPRESSOR) 100 MG tablet Take 1 tablet (100 mg) by mouth 2 times daily  Qty: 180 tablet, Refills: 1    Associated Diagnoses: Hypertension secondary to other renal disorders      mycophenolic acid (GENERIC EQUIVALENT) 360 MG EC tablet Take 1 tablet (360 mg) by mouth 2 times daily  Qty: 60 tablet, Refills: 1    Associated Diagnoses: Kidney replaced by transplant; Kidney transplanted; Aftercare following organ transplant; Immunosuppressive management encounter following kidney transplant      oxyCODONE (ROXICODONE) 5 MG tablet Take 1 tablet (5 mg) by mouth every 6 hours as needed for moderate to severe pain  Qty: 12 tablet, Refills: 0    Associated Diagnoses: Abdominal aortic aneurysm (AAA) without rupture (H); ST elevation myocardial infarction (STEMI), unspecified artery (H); Peripheral artery disease (H)      predniSONE (DELTASONE) 5 MG tablet Take 1 tablet (5 mg) by mouth daily  Qty: 90 tablet, Refills: 0    Comments: Appointment needed  Associated Diagnoses: Kidney replaced by transplant      aspirin (ASA) 81 MG chewable tablet Take 1 tablet (81 mg) by mouth daily    Associated Diagnoses: Abdominal aortic aneurysm (AAA) without rupture (H); ST elevation myocardial infarction (STEMI), unspecified artery (H); Peripheral artery disease (H)      atorvastatin (LIPITOR) 80 MG tablet Take 1 tablet (80 mg) by mouth daily  Qty: 60 tablet, Refills: 4    Associated Diagnoses: Abdominal aortic aneurysm (AAA) without rupture (H); ST elevation myocardial infarction (STEMI), unspecified artery (H);  Peripheral artery disease (H)      lidocaine (LIDODERM) 5 % patch Place 1 patch onto the skin every 24 hours To prevent lidocaine toxicity, patient should be patch free for 12 hrs daily.    Apply to low back as needed for back pain  Qty: 5 patch, Refills: 0    Associated Diagnoses: Abdominal aortic aneurysm (AAA) without rupture (H); Peripheral artery disease (H); ST elevation myocardial infarction involving right coronary artery (H); Bilateral low back pain with left-sided sciatica, unspecified chronicity      lisinopril (ZESTRIL) 5 MG tablet Take 1 tablet (5 mg) by mouth daily  Qty: 60 tablet, Refills: 4    Associated Diagnoses: Abdominal aortic aneurysm (AAA) without rupture (H); ST elevation myocardial infarction (STEMI), unspecified artery (H); Peripheral artery disease (H)      ticagrelor (BRILINTA) 90 MG tablet Take 1 tablet (90 mg) by mouth every 12 hours  Qty: 60 tablet, Refills: 4    Associated Diagnoses: Abdominal aortic aneurysm (AAA) without rupture (H); ST elevation myocardial infarction (STEMI), unspecified artery (H); Peripheral artery disease (H)         STOP taking these medications       amoxicillin (AMOXIL) 500 MG capsule Comments:   Reason for Stopping:                     Consultations:   Consultation during this admission received from cardiology, infectious disease, internal medicine and intensive care unit          Brief History of Illness:   70-year-old woman who underwent endovascular pair of abdominal aortic aneurysm 2 weeks ago with an aorto uniiliac device to the left iliac system and a femoral-femoral crossover graft.  Procedure was complicated by postop MI requiring emergency cardiac catheterization and LAD stenting.  Was placed on Brilinta afterwards.  At home she developed draining right groin wound infection and came in through the ER.  Hemodynamically stable and not febrile but on steroids for known renal failure and in remission from active cancer.  A CT scan was done without  contrast that showed a large fluid collection tracking along the entire length of the femoral-femoral graft and with fluid around both anastomoses in the groins.  Plan for right groin surgical exploration at this point to see if the graft is exposed and the extent of additional repair.           Hospital Course:   Patient underwent initially right groin exploration and packing of the wound. The following day, he underwent explantation of the femoral to femoral bypass graft and placement of a femoral to femoral bypass cadaveric superficial femoral artery graft in retropubic fashion with bilateral sartorius muscle flaps and placement of negative pressure wound vac dressings. Post-operatively, patient had ST elevations concerning for STEMI. She underwent cardiac catheterization that showed a patent RCA stent and vasospasm of the coronaries. Patient was admitted to the CVICU and was placed on nitroglycerin gtt. Patient was taken the following day for wound vac exchange and placement of a Veraflow wound vac.     Patient had her wound vac exchanged again the next day for a regular negative pressure wound vac dressing. Patient continued to improve. Patient was placed on scheduled cefazolin. Patient was ultimately discharged on 5/2/2021 to [facility]. At that time, patient was afebrile. Patient was tolerating wound vac exchanges. Patient remained on aspirin and brilinta.           Discharge Instructions and Follow-Up:   Discharge diet: Regular   Discharge activity: With assistance    Discharge follow-up: Follow-up with Dr. Ferrara arranged    Wound care: Bilateral groin negative pressure wound vac dressing changed MW.            Discharge Disposition:   Discharged to rehabilitation facility        Devendra Salgado MD

## 2021-05-02 NOTE — PROGRESS NOTES
"VASCULAR SURGERY PROGRESS NOTE    Subjective:  Resting comfortably in bed last night.     Objective:    Intake/Output Summary (Last 24 hours) at 4/30/2021 0740  Last data filed at 4/30/2021 0500  Gross per 24 hour   Intake 1040 ml   Output 1100 ml   Net -60 ml     Labs:  ROUTINE IP LABS (Last four results)  BMP  Recent Labs   Lab 04/30/21  0453 04/29/21  0519 04/28/21  0540 04/27/21  0204    141 138 138   POTASSIUM 3.5 3.8 3.8 3.8   CHLORIDE 112* 111* 112* 110*   KAYKAY 8.0* 8.2* 8.6 8.6   CO2 20 23 21 23   BUN 35* 36* 36* 42*   CR 1.20* 1.35* 1.46* 1.68*   GLC 79 89 87 110*     CBC  Recent Labs   Lab 04/30/21 0453 04/29/21  0519 04/28/21  0540 04/27/21  0204   WBC 14.1* 14.0* 13.3* 14.1*   RBC 2.86* 2.73* 2.80* 2.91*   HGB 8.1* 7.5* 7.8* 8.1*   HCT 26.4* 24.9* 25.6* 25.3*   MCV 92 91 91 87   MCH 28.3 27.5 27.9 27.8   MCHC 30.7* 30.1* 30.5* 32.0   RDW 18.6* 18.1* 18.3* 18.4*    264 292 324     INR  Recent Labs   Lab 04/25/21  2200   INR 1.18*     PHYSICAL EXAM:  /87 (BP Location: Right leg)   Pulse 78   Temp 97.9  F (36.6  C) (Oral)   Resp 20   Ht 1.575 m (5' 2\")   Wt 47.7 kg (105 lb 2.6 oz)   SpO2 97%   BMI 19.23 kg/m    General: The patient is alert and oriented. Appropriate. No acute distress  Psych: pleasant affect, answers questions appropriately  Skin: Color appropriate for race, warm, dry.  Respiratory: The patient does not require supplemental oxygen. Breathing unlabored  GI:  Abdomen soft, nontender to light palpation.  Extremities: Bilateral groin wound vacs with good suction, keri-wound skin without erythema.  Generalized old bruising in groin area.  DP/PT signals monophasic bilaterally, feet warm and pink.          ASSESSMENT:  71 yo F s/p EVAR AUI and left to right femoral to femoral bypass graft for 5.9 cm AAA. Initial post-op course was complicated by inferior STEMI requiring emergent cardiac catheterization and PCI to RCA. Patient presenting on 4/23/2021 to ED with subjective " fevers and chills and erythema and drainage from right groin wound.      4/24/2021 - Incision and drainage of right groin wound; packing of the wound with Adaptic, moistened Kerlix gauze, ABD pad     4/25/2021 - Explantation of femoral to femoral bypass graft; femoral to femoral bypass graft using cadaveric homograft (tunneled retropubic); bilateral sartorius muscle flaps; placement of negative pressure wound vac therapy.      4/26/2021 - Cardiac catheterization for post-op EKG changes; RCA stent patent; diffuse vasospasm of LAD, LCx.  Returned to OR for bilateral groin washout and placement of Veraflo wound vac system.     4/27/2021: transfer out of ICU.    Undergoing [facility]. [facility]       PLAN:  -Continue wound vac,   -Appreciate ID recs, continue Cefazolin 2g Q 12 hours  -Asprin, Brillinta, Atorvastatin  -Appreciate cardiology assistance, Imdur 60mg every day, [id number] resumed, holding metoprolol  -PT/OT  -Promote nutrition, high protein intake for wound healing  - Tentative discharge to [facility] today     Devendra Salgado MD  Vascular Surgery

## 2021-05-02 NOTE — PROGRESS NOTES
[facility]  Transplant Nephrology Progress Note  Date of Admission:  4/23/2021  Today's Date: 05/02/2021  Requesting physician: Raven Lewis MD    Recommendations:  - Continue MPA and prednisone 5 mg     Assessment & Plan   # LDKT: Stable kidney function, now below baseline, likely due to loss of muscle mass recently, as well as IV fluids.   - Baseline Creatinine: ~ 1.3-1.6   - Proteinuria: Not checked recently   - Date DSA Last Checked: Apr/2018      Latest DSA: No   - BK Viremia: Not checked recently due to time from transplant   - Kidney Tx Biopsy: No    # Immunosuppression: Mycophenolic acid (dose 360 mg every 12 hours) and Prednisone (dose 5 mg daily)   - Changes: No    # Infection Prophylaxis:   - PJP: None    # Hypertension: Borderline control;  Goal BP: < 130/80   - Volume status: Euvolemic     - Changes: No at this time    # Anemia in Chronic Renal Disease: Hgb: Stable, low      JONATHAN: No   - Iron studies: Not checked recently    # Mineral Bone Disorder:   - Vitamin D; level: Not checked recently        On supplement: No  - Calcium; level: low        On supplement: No    # Electrolytes:   - Potassium; level: Normal        On supplement: No  - Bicarbonate; level: Normal        On supplement: No    # Infrarenal AAA, s/p EVAR with fem-fem bypass on 4/6 c/b infected R femoral graft s/p  Debridement. management per surgery.    # MSSA bacteremia 2/2 groin abscess and endovascular fem-fem bypass graft infection now s/p I&D, washout and new graft placement. ECHO with no vegetations.  ID following on IV cefazolin    # Transplant History:  Etiology of Kidney Failure: Alport's syndrome  Tx: LDKT  Transplant: 9/20/2004 (Kidney)  Significant changes in immunosuppression: None  Significant transplant-related complications: Recurrent Skin Cancers    Recommendations were communicated to the primary team via this note.    Patient seen and discussed with Dr. See.     Dana CRUZ  "DOMINIC Obrien  Pager: [phone]    Physician Attestation   I, Hitesh See, saw and evaluated Maribel Yang as part of a shared visit.  I have reviewed and discussed with the advanced practice provider their history, physical and plan.    I personally reviewed the vital signs, medications and labs.    My key history or physical exam findings: Patient reports feeling good.  Stable kidney function.    Key management decisions made by me: Would make no changes in immunosuppression.    Hitesh See  Date of Service (when I saw the patient): 21    Interval History:  Ms. Yang's kidney function is improved with creatinine ~ 1.2.  She reports feeling increasingly better  Denies any chest pain or shortness of breath.  No nausea or vomiting.  Stable watery stools.  No fever, sweats or chills.  Some puffiness in her feet.    Review of Systems    4 point ROS was obtained and negative except as noted in the Interval History.     Physical Exam   Temp  Av.6  F (37  C)  Min: 97.2  F (36.2  C)  Max: 100.7  F (38.2  C)  Arterial Line BP  Min: 49/43  Max: 172/90  Arterial Line MAP (mmHg)  Av.1 mmHg  Min: 48 mmHg  Max: 131 mmHg      Pulse  Av.6  Min: 50  Max: 95 Resp  Av.6  Min: 10  Max: 40  SpO2  Av.2 %  Min: 88 %  Max: 100 %     /79 (BP Location: Right leg)   Pulse 75   Temp 98.5  F (36.9  C) (Oral)   Resp 18   Ht 1.575 m (5' 2\")   Wt 47.7 kg (105 lb 2.6 oz)   SpO2 97%   BMI 19.23 kg/m     Date 21 0700 - 21 0659   Shift 0676-1039 7079-1148 0867-4834 24 Hour Total   INTAKE   P.O. 100   100   I.V. 227.8   227.8   Blood Components 300   300   Shift Total(mL/kg) 627.8(13.13)   627.8(13.13)   OUTPUT   Urine 420   420   Blood 20   20   Shift Total(mL/kg) 440(9.21)   440(9.21)   Weight (kg) 47.8 47.8 47.8 47.8      Admit Weight: 46.2 kg (101 lb 14.4 oz)     GENERAL APPEARANCE: alert and no distress  RESP: lungs clear to auscultation - no rales, rhonchi or " wheezes  CV: regular rhythm, normal rate, no rub, no murmur  EDEMA: trace to 1+ LE edema bilaterally  ABDOMEN: soft, ND  MS: extremities normal - no gross deformities noted, no evidence of inflammation in joints, no muscle tenderness  SKIN: no rash  KIDNEY TX: normal    Data   CMP  Recent Labs   Lab 04/30/21 0453 04/29/21  0519 04/28/21  0540 04/27/21  0830 04/27/21  0204 04/26/21  1328 04/26/21  0459    141 138  --  138 138 140   POTASSIUM 3.5 3.8 3.8  --  3.8 4.1 4.4   CHLORIDE 112* 111* 112*  --  110* 111* 112*   CO2 20 23 21  --  23 18* 18*   ANIONGAP 8 7 5  --  5 10 11   GLC 79 89 87  --  110* 146* 138*   BUN 35* 36* 36*  --  42* 41* 38*   CR 1.20* 1.35* 1.46*  --  1.68* 1.58* 1.51*   GFRESTIMATED 46* 40* 37*  --  31* 33* 35*   GFRESTBLACK 54* 47* 42*  --  36* 38* 41*   KAYKAY 8.0* 8.2* 8.6  --  8.6 8.4* 8.4*   MAG  --   --  2.7* 2.8* 3.2* 1.9 2.0   PHOS  --   --  2.8  --  3.4 4.5 4.6*     CBC  Recent Labs   Lab 04/30/21 0453 04/29/21  0519 04/28/21  0540 04/27/21  0204   HGB 8.1* 7.5* 7.8* 8.1*   WBC 14.1* 14.0* 13.3* 14.1*   RBC 2.86* 2.73* 2.80* 2.91*   HCT 26.4* 24.9* 25.6* 25.3*   MCV 92 91 91 87   MCH 28.3 27.5 27.9 27.8   MCHC 30.7* 30.1* 30.5* 32.0   RDW 18.6* 18.1* 18.3* 18.4*    264 292 324     INR  Recent Labs   Lab 04/25/21  2200   INR 1.18*   PTT 26     ABG  Recent Labs   Lab 04/25/21 2011 04/25/21  1912   PH 7.30* 7.32*   PCO2 43 39   PO2 110* 124*   HCO3 21 20*   O2PER 0.44 43.0      Urine Studies  Recent Labs   Lab Test 01/20/14  0841   COLOR Light Yellow   APPEARANCE Clear   URINEGLC Negative   URINEBILI Negative   URINEKETONE Negative   SG 1.013   UBLD Negative   URINEPH 5.0   PROTEIN Negative   NITRITE Negative   LEUKEST Moderate*   RBCU 1   WBCU 2     Recent Labs   Lab Test 04/24/18  1812 01/31/17  1030 06/03/16  0900 10/22/15  1024 06/25/15  1007 01/14/15  0902 04/07/14  0921   UTPG 1.04* 1.67* 0.76* 0.44* 0.31* 0.27* 0.05     PTH  Recent Labs   Lab Test 06/12/19  1810   PTHI  89*     Iron Studies  No lab results found.    IMAGING:  All imaging studies reviewed by me.

## 2021-05-02 NOTE — PROGRESS NOTES
Patient is a 70 year old female  admitted to room 428 via HE.  Patient is alert and oriented X 4. See Epic for VS and assessment.  Patient is able to transfer AO1 using walker. Patient was settled into their room, shown call light, tv, mealtimes etc. Oriented to unit. Will continue monitoring pain level and VS. Notifying MD with any concerns.  Follow MD orders for cares and medications.    Level of Schooling:vocational school and 2 yr college degree  Ethnicity:  Marital Status:  Dentures: No  Hearing Aid: No  Smoker:  No  Glasses: Yes  Occupation: retired  Falls 0-1 mo: 0 2-6 mo: 0  Stairs prior function: Dependent  Prior device use: Walker   Advanced Care Directive Referral to Social Work?No    Pt has large bruising on L forearm and entire pelvis. Buttock has blanchable and non-blanchable redness. WOC consult entered. Pt has double lumen PICC and PIV - unsure of necessity of PIV? Pt reports having diarrhea over the past few days but stated it is much better now that she is eating. BP is obtained using small adult cuff on right ankle. Wound vac is intact and running well.       Patient's most recent vital signs are:     Vital signs:  BP: 112/93  Temp: 95.3  HR: 87  RR: 16  SpO2: 97 %     Patient does not have new respiratory symptoms.  Patient does not have new sore throat.  Patient does not have a fever greater than 99.5.

## 2021-05-02 NOTE — PLAN OF CARE
"/76 (BP Location: Left arm)   Pulse 93   Temp 97.7  F (36.5  C) (Oral)   Resp 18   Ht 1.575 m (5' 2\")   Wt 47.8 kg (105 lb 6.1 oz)   SpO2 95%   BMI 19.27 kg/m      Hours of Care: 07:00- 19:30  Reason for admission: Infection to BL groin surgical sites     Neuro: A/Ox4, pleasant and cooperative with cares.  Cardiac: SR, SBA 130s-160.?  Respiratory: Sating >95 on RA. No SOB.  GI/: Voiding adequately via commode, BM x2.  Diet: Tolerating regular diet, appetite good.   Pain: No complaints of pain this shift.   Activity: Assist of 2 with GB and Walker to [facility]. Repositioning self independently in bed. Ambulated in renee with therapy.  LDAs:  R PICC: TKO + heparin locked. RPIV: SL. Wound vac to BL groin incision sites.  Skin/Wounds: No new deficits noted. ?     Plan: Transfer to TCU tomorrow 5/2  ??  Continue to monitor and follow POC  Jazmyn Hodge RN on 5/1/2021 at 7:57 PM   ?  ?    "

## 2021-05-03 ENCOUNTER — APPOINTMENT (OUTPATIENT)
Dept: LAB | Facility: CLINIC | Age: 71
End: 2021-05-03
Attending: INTERNAL MEDICINE
Payer: COMMERCIAL

## 2021-05-03 LAB
BACTERIA SPEC CULT: NO GROWTH
BACTERIA SPEC CULT: NO GROWTH
Lab: NORMAL
SPECIMEN SOURCE: NORMAL
SPECIMEN SOURCE: NORMAL

## 2021-05-03 PROCEDURE — 97165 OT EVAL LOW COMPLEX 30 MIN: CPT | Mod: GO

## 2021-05-03 PROCEDURE — 97110 THERAPEUTIC EXERCISES: CPT | Mod: GP

## 2021-05-03 PROCEDURE — 2W06X6Z CHANGE PRESSURE DRESSING ON RIGHT INGUINAL REGION: ICD-10-PCS | Performed by: INTERNAL MEDICINE

## 2021-05-03 PROCEDURE — 97530 THERAPEUTIC ACTIVITIES: CPT | Mod: GO

## 2021-05-03 PROCEDURE — 99306 1ST NF CARE HIGH MDM 50: CPT | Performed by: INTERNAL MEDICINE

## 2021-05-03 PROCEDURE — 97530 THERAPEUTIC ACTIVITIES: CPT | Mod: GP

## 2021-05-03 PROCEDURE — 258N000003 HC RX IP 258 OP 636

## 2021-05-03 PROCEDURE — 250N000009 HC RX 250: Performed by: INTERNAL MEDICINE

## 2021-05-03 PROCEDURE — 97535 SELF CARE MNGMENT TRAINING: CPT | Mod: GO

## 2021-05-03 PROCEDURE — 97163 PT EVAL HIGH COMPLEX 45 MIN: CPT | Mod: GP

## 2021-05-03 PROCEDURE — 250N000013 HC RX MED GY IP 250 OP 250 PS 637: Performed by: INTERNAL MEDICINE

## 2021-05-03 PROCEDURE — 120N000009 HC R&B SNF

## 2021-05-03 PROCEDURE — 250N000012 HC RX MED GY IP 250 OP 636 PS 637: Performed by: INTERNAL MEDICINE

## 2021-05-03 PROCEDURE — 97605 NEG PRS WND THER DME<=50SQCM: CPT

## 2021-05-03 PROCEDURE — 250N000011 HC RX IP 250 OP 636: Performed by: INTERNAL MEDICINE

## 2021-05-03 PROCEDURE — 97116 GAIT TRAINING THERAPY: CPT | Mod: GP

## 2021-05-03 PROCEDURE — G0463 HOSPITAL OUTPT CLINIC VISIT: HCPCS | Mod: 25

## 2021-05-03 RX ORDER — PANTOPRAZOLE SODIUM 40 MG/1
40 TABLET, DELAYED RELEASE ORAL
Status: DISCONTINUED | OUTPATIENT
Start: 2021-05-04 | End: 2021-05-23 | Stop reason: HOSPADM

## 2021-05-03 RX ORDER — LISINOPRIL 5 MG/1
5 TABLET ORAL DAILY
Status: DISCONTINUED | OUTPATIENT
Start: 2021-05-04 | End: 2021-05-23 | Stop reason: HOSPADM

## 2021-05-03 RX ORDER — PANTOPRAZOLE SODIUM 40 MG/1
40 TABLET, DELAYED RELEASE ORAL
Status: DISCONTINUED | OUTPATIENT
Start: 2021-05-03 | End: 2021-05-03

## 2021-05-03 RX ORDER — FLUTICASONE PROPIONATE 50 MCG
1 SPRAY, SUSPENSION (ML) NASAL DAILY
Status: DISCONTINUED | OUTPATIENT
Start: 2021-05-03 | End: 2021-05-23 | Stop reason: HOSPADM

## 2021-05-03 RX ORDER — FAMOTIDINE 10 MG
10 TABLET ORAL 2 TIMES DAILY
Status: DISCONTINUED | OUTPATIENT
Start: 2021-05-03 | End: 2021-05-03

## 2021-05-03 RX ORDER — SODIUM CHLORIDE 9 MG/ML
INJECTION, SOLUTION INTRAVENOUS
Status: COMPLETED
Start: 2021-05-03 | End: 2021-05-03

## 2021-05-03 RX ADMIN — LISINOPRIL 5 MG: 5 TABLET ORAL at 08:06

## 2021-05-03 RX ADMIN — SODIUM CHLORIDE 500 ML: 9 INJECTION, SOLUTION INTRAVENOUS at 20:33

## 2021-05-03 RX ADMIN — TICAGRELOR 90 MG: 90 TABLET ORAL at 08:06

## 2021-05-03 RX ADMIN — PANTOPRAZOLE SODIUM 40 MG: 40 TABLET, DELAYED RELEASE ORAL at 11:35

## 2021-05-03 RX ADMIN — CALCIUM CARBONATE 600 MG (1,500 MG)-VITAMIN D3 400 UNIT TABLET 1 TABLET: at 20:30

## 2021-05-03 RX ADMIN — SODIUM CHLORIDE, PRESERVATIVE FREE 10 ML: 5 INJECTION INTRAVENOUS at 11:24

## 2021-05-03 RX ADMIN — Medication 5 UNITS: at 11:24

## 2021-05-03 RX ADMIN — HEPARIN, PORCINE (PF) 10 UNIT/ML INTRAVENOUS SYRINGE 10 ML: at 20:32

## 2021-05-03 RX ADMIN — MYCOPHENOLIC ACID 360 MG: 360 TABLET, DELAYED RELEASE ORAL at 20:29

## 2021-05-03 RX ADMIN — PREDNISONE 5 MG: 5 TABLET ORAL at 08:06

## 2021-05-03 RX ADMIN — TICAGRELOR 90 MG: 90 TABLET ORAL at 20:30

## 2021-05-03 RX ADMIN — ISOSORBIDE MONONITRATE 60 MG: 60 TABLET, EXTENDED RELEASE ORAL at 08:06

## 2021-05-03 RX ADMIN — ASPIRIN 81 MG CHEWABLE TABLET 81 MG: 81 TABLET CHEWABLE at 08:06

## 2021-05-03 RX ADMIN — ATORVASTATIN CALCIUM 80 MG: 40 TABLET, FILM COATED ORAL at 08:06

## 2021-05-03 RX ADMIN — MYCOPHENOLIC ACID 360 MG: 360 TABLET, DELAYED RELEASE ORAL at 08:07

## 2021-05-03 RX ADMIN — CALCIUM CARBONATE 600 MG (1,500 MG)-VITAMIN D3 400 UNIT TABLET 1 TABLET: at 08:07

## 2021-05-03 RX ADMIN — OXYCODONE HYDROCHLORIDE 5 MG: 5 TABLET ORAL at 13:23

## 2021-05-03 RX ADMIN — LIDOCAINE 1 PATCH: 560 PATCH PERCUTANEOUS; TOPICAL; TRANSDERMAL at 20:33

## 2021-05-03 RX ADMIN — CEFAZOLIN SODIUM 2 G: 2 INJECTION, SOLUTION INTRAVENOUS at 20:30

## 2021-05-03 RX ADMIN — CEFAZOLIN SODIUM 2 G: 2 INJECTION, SOLUTION INTRAVENOUS at 08:07

## 2021-05-03 RX ADMIN — FLUTICASONE PROPIONATE 1 SPRAY: 50 SPRAY, METERED NASAL at 11:22

## 2021-05-03 ASSESSMENT — ENCOUNTER SYMPTOMS: FEVER: 1

## 2021-05-03 ASSESSMENT — ACTIVITIES OF DAILY LIVING (ADL): PREVIOUS_RESPONSIBILITIES: HOUSEKEEPING;MEAL PREP;MEDICATION MANAGEMENT;FINANCES

## 2021-05-03 NOTE — PLAN OF CARE
Alert and oriented, VSS. Denies pain. PIERCE, relieved w/ rest. Reports congestion, new order for flonase. Appetite good, promoted fluids. IV antibiotic infused via R PICC. Blanchable redness / small scab to buttock; applied new sacral mepilex. Alessandro. groin VACs in place, WOCN changed dressing after PRN oxycodone given to prevent pain. Tubi  applied to BLE, 3+ edema in feet. Continent of B/B; LBM 5/3.     Patient's most recent vital signs are:     Vital signs:  BP: 112/89  Temp: 96.4  HR: 84  RR: 16  SpO2: 93 %     Patient does not have new respiratory symptoms.  Patient does not have new sore throat.  Patient does not have a fever greater than 99.5.

## 2021-05-03 NOTE — PROGRESS NOTES
05/03/21 1135   Quick Adds   Type of Visit Initial Occupational Therapy Evaluation   Living Environment   People in home spouse   Current Living Arrangements house   Home Accessibility stairs to enter home;stairs within home   Number of Stairs, Main Entrance 6   Number of Stairs, Within Home, Primary other (see comments)  (14)   Transportation Anticipated family or friend will provide   Living Environment Comments lanundry in baseline, 2 sets of 14 stairs to basement. tub shower. was using walker on second floor only. spouse does most of driving but pt can drive local distances. spouse in good health.    Self-Care   Usual Activity Tolerance moderate   Current Activity Tolerance fair   Regular Exercise Yes   Activity/Exercise Type   (HH therapy)   Exercise Amount/Frequency 3-5 times/wk   Equipment Currently Used at Home walker, standard   Activity/Exercise/Self-Care Comment HHOT/PT/RN during the week    Disability/Function   Hearing Difficulty or Deaf no   Wear Glasses or Blind yes   Vision Management glasses   Concentrating, Remembering or Making Decisions Difficulty no   Difficulty Communicating no   Difficulty Eating/Swallowing no   Walking or Climbing Stairs Difficulty yes   Walking or Climbing Stairs ambulation difficulty, requires equipment   Mobility Management uses walker    Dressing/Bathing Difficulty no   Toileting issues no   Doing Errands Independently Difficulty (such as shopping) yes   Errands Management spouse/daughter A    Fall history within last six months no   Change in Functional Status Since Onset of Current Illness/Injury yes   General Information   Onset of Illness/Injury or Date of Surgery 05/02/21   Referring Physician Sue   Patient/Family Therapy Goal Statement (OT) return home with spouse    Additional Occupational Profile Info/Pertinent History of Current Problem Maribel Yang is 70 year old year old female with hx of  CKD s/p LDKT 2004, recent inferior STEMI s/p RCA stent  (4/7/21), and SCC of right lung in remission since 2014, with prior fem-fem bypass s/p EVAR 4/6/21 is being admitted to  TCU on 05/02/2021 after hospitalization at [facility] from 04/23/2021 to 05/02/2021. She was admitted with sepsis 2/2 right femoral groin access site and perigraft abscess. Complicated by ST elevations and taken to cath lab found to have coronary vasospasm.    Existing Precautions/Restrictions fall   Heart Disease Risk Factors Age;Medical history;Lack of physical activity   Cognitive Status Examination   Orientation Status orientation to person, place and time   Affect/Mental Status (Cognitive) WNL   Follows Commands WNL   Visual Perception   Visual Impairment/Limitations WNL   Sensory   Sensory Quick Adds No deficits were identified   Pain Assessment   Patient Currently in Pain No   Integumentary/Edema   Integumentary/Edema no deficits were identifed   Posture   Posture kyphosis   Range of Motion Comprehensive   General Range of Motion no range of motion deficits identified   Strength Comprehensive (MMT)   Comment, General Manual Muscle Testing (MMT) Assessment generalized weakness 4/5 BUE's   Muscle Tone Assessment   Muscle Tone Quick Adds No deficits were identified   Coordination   Upper Extremity Coordination No deficits were identified   ARC Assessment Only   Acute Rehab Functional Assessment See IP Rehab Daily Documentation Flowsheet for Functional Mobility/ADL Assessment   Instrumental Activities of Daily Living (IADL)   Previous Responsibilities housekeeping;meal prep;medication management;finances   IADL Comments spouse and daughter A with transportation and household tasks.    Clinical Impression   Criteria for Skilled Therapeutic Interventions Met (OT) yes   OT Diagnosis dec IND with ADL/IADLs and transfers   OT Problem List-Impairments impacting ADL activity tolerance impaired;strength;balance   Assessment of Occupational Performance 5 or more Performance Deficits   Identified  Performance Deficits all ADL's and transffers   Planned Therapy Interventions (OT) ADL retraining;IADL retraining;strengthening;ROM;transfer training   Clinical Decision Making Complexity (OT) low complexity   Therapy Frequency (OT) 6x/week   Predicted Duration of Therapy 10-12 days   Risk & Benefits of therapy have been explained evaluation/treatment results reviewed;care plan/treatment goals reviewed;risks/benefits reviewed;current/potential barriers reviewed;patient   Comment-Clinical Impression pt presents with overall deconditioning, dec strength and ax tolerance, and impaired balance. Pt will benefit from skilled OT services to progress IND with ADL/IADL's prior to return home with spouse A as needed.    Therapy Certification   Start of Care Date 05/02/21   Certification date from 05/02/21   Certification date to 06/02/21   Medical Diagnosis Dec IND with ADL/IADL's and functional transfers.    Total Evaluation Time (Minutes)   Total Evaluation Time (Minutes) 10

## 2021-05-03 NOTE — PLAN OF CARE
Discharge Planner Post-Acute Rehab OT:     Discharge Plan: home with spouse/daughter A as needed. HHOT    Precautions: fall, wound vac     Current Status:  ADLs: CGA for g/h in standing at sink, max A for LE dressing, set- up A for UE dressing, min A for toilet transfer, mod A for pericares.     IADLs: spouse and daughter A at baseline but pt would like to be IND with simple meal prep and house keeping.     Vision/Cognition: appears intact. Will continue to assess functional cog.     Assessment: pt presents with overall dec ax tolerance and TB strength. Pt will benefit from skilled OT to progress IND with ADL/IADL's and functional transfers as pt is below baseline function. Supportive spouse and daughter able to A at discharge. Anticipate 10-12 days for OT services.     Other Barriers to Discharge (DME, Family Training, etc): pt has several stairs at home (see PT notes). Will need shower chair vs tub bench and pt requesting grab bar by toilet vs RTS.

## 2021-05-03 NOTE — PLAN OF CARE
"Discharge Planner Post-Acute Rehab PT:     Discharge Plan: home with assist from spouse and HH PT    Precautions: falls, woc vac (sites located on ant pelvis 2x near ASISs)    Current Status:  Bed Mobility: sup<>sit modA for BLE placement into bed  Transfer: STS FWW CGA  Gait: amb, FWW, 160', CGA, w/c follow  Stairs: up/down 6x6\" steps B rails CGA  Balance: requires BUE support    Assessment:  Maribel will benefit from PT to progress her overall strength, endurance, balance and independnece amb with FWW to mod I level at d/c. She will need to navigate 17 steps total (3 to enter, 14 to 2nd floor) to access bed and bathroom. Pt owns FWW. Has family to assist at d/c.    Other Barriers to Discharge (DME, Family Training, etc): family training    "

## 2021-05-03 NOTE — PROGRESS NOTES
"CLINICAL NUTRITION SERVICES - ASSESSMENT NOTE     Nutrition Prescription    RECOMMENDATIONS FOR MDs/PROVIDERS TO ORDER:  None today    Malnutrition Status:    Severe malnutrition in the context of acute on chronic illness    Recommendations already ordered by Registered Dietitian (RD):  Magic Cup (orange/berry) at  snack    Future/Additional Recommendations:  Adjust supplement pending pt preference     REASON FOR ASSESSMENT  Maribel Yang is a/an 70 year old female assessed by the dietitian for a positive MST screen for unknown weight loss and a decreased appetite    NUTRITION HISTORY  - Pt received heart healthy diet education on 4/7/21.  - Pt was receiving Ensure Enlive (strawberry) and Magic Cup (gray) at [facility]. She reports she disliked the Ensure and liked the Magic Cup but it must be cold.    CURRENT NUTRITION ORDERS  Diet: Regular  Intake/Tolerance: 100% of meals per flowsheet. On average, pt is ordering 1975 kcal and 90 g protein daily per [employer]. This is likely meeting 100% minimum energy and protein needs.    LABS  Labs reviewed    MEDICATIONS  Medications reviewed  - Caltrate     ANTHROPOMETRICS  Ht Readings from Last 1 Encounters:   04/23/21 1.575 m (5' 2\")   Most Recent Weight: 47.7 kg (105 lb 2.6 oz)  IBW: 50 kg (95% IBW)  BMI: Normal BMI  Weight History: Patient has gained 11 lbs (12%) over the last 1 month, overall gaining 12 lbs (13%) over the last ~8 months.  Wt Readings from Last 10 Encounters:   05/02/21 47.7 kg (105 lb 2.6 oz)   04/10/21 48 kg (105 lb 13.1 oz)   04/01/21 42.6 kg (94 lb)   03/16/21 43.3 kg (95 lb 6.4 oz)   09/08/20 42.4 kg (93 lb 8 oz)   09/02/20 42 kg (92 lb 11.2 oz)   05/19/20 42.2 kg (93 lb)   03/16/20 42.4 kg (93 lb 6.4 oz)   03/07/20 45.8 kg (101 lb)   02/21/20 42.2 kg (93 lb)     Dosing Weight: 48 kg - admit wt    ASSESSED NUTRITION NEEDS  Estimated Energy Needs: 4557-7220 kcals/day (30 - 35 kcals/kg)  Justification: Wound healing  Estimated Protein " Needs: 58-72 grams protein/day (1.2 - 1.5 grams of pro/kg)  Justification: Wound healing  Estimated Fluid Needs: 1 mL/kcal  Justification: Per provider pending fluid status    PHYSICAL FINDINGS  See malnutrition section below.  - Right fleshy buttock partial thickness wound consistent with friction or abrasion, not pressure.  - No Pressure Injury on buttocks at this time per WOC note from 4/26    MALNUTRITION  % Intake: Decreased intake does not meet criteria  % Weight Loss: None noted  Subcutaneous Fat Loss: Facial region: mild  Muscle Loss: Temporal, Facial & jaw region, Thoracic region (clavicle, acromium bone, deltoid, trapezius, pectoral), Upper arm (bicep, tricep), Lower arm (forearm), Dorsal hand, Upper leg (quadricep, hamstring) and Posterior calf: moderate   Fluid Accumulation/Edema: Mild-Moderate  Malnutrition Diagnosis: Severe malnutrition in the context of acute on chronic illness    NUTRITION DIAGNOSIS  Predicted inadequate protein-energy intake related to variable appetite as evidenced by pt reliant on PO intakes to meet 100% of nutritional needs with potential for variation       INTERVENTIONS  Implementation  Discussed nutrition history and PO since admission. Discussed menu ordering and snacks available on the unit. Pt filled out their menu and are finding foods they enjoy and feels she will get enough to eat during this admission. Encouraged pt to have dtr bring in snacks/food (she lives nearby) and pt may store in unit fridge if needed. Discussed oral nutrition supplements and they would like Magic Cup only at HS. Encouraged pt to put in the unit freezer if desired at a later time. She denied any questions or concerns at this time.     Goals  Patient to consume % of nutritionally adequate meal trays TID, or the equivalent with supplements/snacks.     Monitoring/Evaluation  Progress toward goals will be monitored and evaluated per protocol.      Rosalina Rodriguez, RD, LD  TCU/8A Pager (M-F):  [phone]  Weekend Pager ([place]): [phone]

## 2021-05-03 NOTE — CONSULTS
"[facility] Nurse Inpatient Wound Assessment   Reason for consultation: Evaluate and treat Bilateral groin and buttocks wounds    Assessment  Bilateral groin wounds due to Surgical Wound  Status: initial assessment    Buttocks unable to assess    Treatment Plan  Bilateral groin wound: Negative Pressure Wound Therapy (NPWT) to be changed by WO RN every Mon/Wed/Fri with small  black foam. Staff RN to assess pump settings set to -125 and dressing integrity every shift. Remove foam dressing and replace with BID normal saline moist gauze dressing if:  -a dressing failure which cannot be repaired within 2 hours  -patient is discharging to home without a home pump  -patient is discharging to a facility outside the local area  -if a dressing is a \"Silver Foam\", remove before Radiation Therapy or MRI    Buttocks prevention plan:  Cleanse the area with NS and pat dry.  Apply No sting film barrier to periwound skin.  Cover wound with Mepilex. Sacral Mepilex (#[id number])  Change dressing Q 3 days.  Turn and reposition Q 2hrs.  Ensure pt has Luis-cushion while sitting up in the chair.  FYI- If pt has constant incontinent loose stools needing dressing changes Q shift please discontinue the Mepilex dressing and apply criticaid barrier paste BID and PRN.      Orders Written  Recommended provider order: None, at this time  [facility] Nurse follow-up plan:Monday/WednesdayFriday  Nursing to notify the Provider(s) and re-consult the [facility] Nurse if wound(s) deteriorates or new skin concern.    Patient History  According to provider note(s):    69 yo F s/p EVAR AUI and left to right femoral to femoral bypass graft for 5.9 cm AAA. Initial post-op course was complicated by inferior STEMI requiring emergent cardiac catheterization and PCI to RCA. Patient presenting on 4/23/2021 to ED with subjective fevers and chills and erythema and drainage from right groin wound.      4/24/2021 - Incision and drainage of right groin wound; packing of the wound with Adaptic, " moistened Kerlix gauze, ABD pad     4/25/2021 - Explantation of femoral to femoral bypass graft; femoral to femoral bypass graft using cadaveric homograft (tunneled retropubic); bilateral sartorius muscle flaps; placement of negative pressure wound vac therapy.     4/26/2021 - Cardiac catheterization for post-op EKG changes; RCA stent patent; diffuse vasospasm of LAD, LCx.  Returned to OR for bilateral groin washout and placement of Veraflo wound vac system.    Objective Data  Containment of urine/stool: Continent of bladder and Continent of bowel    Active Diet Order  Orders Placed This Encounter      Regular Diet Adult      Output:   I/O last 3 completed shifts:  In: 360 [P.O.:360]  Out: -     Risk Assessment:   Sensory Perception: 4-->no impairment  Moisture: 4-->rarely moist  Activity: 3-->walks occasionally  Mobility: 3-->slightly limited  Nutrition: 2-->probably inadequate  Friction and Shear: 2-->potential problem  Juan Luis Score: 18                          Labs:   Recent Labs   Lab 04/30/21  0453   HGB 8.1*   WBC 14.1*       Physical Exam  Areas of skin assessed: focused buttocks and bilateral groin    Wound Location:  Bilateral groin    Right groin    Left groin    Date of last photo 5/3  Wound History: See above, surgical wounds per vascular surgery.      Vac placed using ostomy barrier ring to protect sutures and prevent leaking, window paned around wound to protect skin.     Right groin: 8 x 6 x 1.8 cm  Tunneling up to 2.5 cm at 11 o'clock, 3.5 cm at 3 o'clock and 1.5 at 5 o'clock  90% clean minimally granulating base, 10% scattered slough  Left groin 1.5 x 6 x 1 cm  Tunneling up to 3 cm at 11 o'clock and 2.5 at 3 o'clock  Clean minimally granulating base    Did not see buttocks, will return tomorrow to assess.      Interventions  Visual inspection and assessment completed   Wound Care Rationale Promote moist wound healing without tissue dehydration , Gently fill dead space to prevent abscess formation   and Provide protection   Wound Care: done per plan of care  Supplies: floor stock, discussed with RN and discussed with patient  Current off-loading measures: Pillows  Current support surface: Standard  Atmos Air mattress  Education provided to: importance of repositioning, plan of care, wound progress and Infection prevention   Discussed plan of care with Patient and Nurse    Janice Clarke RN CWOCN

## 2021-05-03 NOTE — PLAN OF CARE
Physical Therapy Discharge Summary    Reason for therapy discharge:    Discharged to transitional care facility.    Progress towards therapy goal(s). See goals on Care Plan in [facility] electronic health record for goal details.  Goals partially met.  Barriers to achieving goals:   discharge from facility.    Therapy recommendation(s):    Continued therapy is recommended.  Rationale/Recommendations:  to maximize functional mobility.

## 2021-05-03 NOTE — PROGRESS NOTES
"Social Work: Initial Assessment with Discharge Plan    Patient Name: Maribel Yang  : 1950  Age: 70 year old  MRN: [id number]  Completed assessment with: Pt interview and chart review  Admitted to TCU: 2021    Presenting Information   Date of SW assessment: May 3, 2021  Health Care Directive: Declined completing  Primary Health Care Agent: Spouse, Padilla  Secondary Health Care Agent: Daughter, Aminata  Living Situation: 2 story home with   Previous Functional Status: Mod A with ADLs, family assistance for some IADLs.  DME available: See therapy evals  Patient and family understanding of hospitalization: Yes  Cultural/Language/Spiritual Considerations: English-speaking white woman; spiritual but not involved with organized Congregation  Abuse concerns: No  BIMS: Pt scored 15 on BIMS indicating 15  PHQ-9: Pt scored 3 on PHQ-9 indicating no depressive symptoms  PAS: confirmation number- [id number]  Has there been a level II screen?  No  Were there any recommendations in the screen? N/A  If yes, will the recommendations we incorporated into the Plan of Care?  N/A  Physical Health  Reason for admission: Ongoing rehabilitation and antibiotics following STEMI and hematoma    Provider Information   Primary Care Physician:Lisa Martinez [phone]  : [facility]    Mental Health:   Diagnosis: No concerns at this time  Current Support/Services: Strong family support  Previous Services: Not asked  Services Needed/Recommended: Health psychology and spiritual health services available in TCU by consult and referral if needed. Pt considering both referrals.    Substance Use:  Diagnosis: Infrequent cigarette smoker  Current Support/Services: None  Previous Services: Not discussed  Services Needed/Recommended: Not discussed    Support System  Marital Status: . Spouse: Padilla.  Family support: Pt has four adult children, but she is currently not \"associating with\" her sons Francisco " and Carlo. Her daughter, Aminata, is highly involved and supportive.  Other support available: Siblings, some local some out of state  Gaps in support system: No concerns at this time  Does patient require assistance from staff to communicate with family and/or for virtual medical appointments? No  Prior to your admission, was there anyone you relied on for assistance with ADLs?  was assisting with some bathing and dressing needs in the few weeks PTA; before that pt felt independent.    Community Resources  Current in home services: [facility] home care (SN/PT/OT/SLP)  Previous services: [employer] on Wheels (discontinued service)    Financial/Employment/Education  Employment Status: Retired  Income Source: Social security   Education: Not asked  Financial Concerns:  None at this time  Insurance: [employer] Medicare      Discharge Plan   Patient and family discharge goal: Home with family  Provided Education on discharge plan: YES  Patient agreeable to discharge plan:  YES  A list of Medicare Certified Facilities was provided to the patient and/or family to encourage patient choice. Based on location and rating, patient would like referrals made to: N/A  General information regarding anticipated insurance coverage and possible out of pocket cost was discussed. Patient and patient's family are aware patient may incur the cost of transportation to the facility, pending insurance payment: YES  Barriers to discharge: Medical stability, therapy goals met, safe discharge plan    Discharge Recommendations   Disposition: Home with recommended services  Transportation Needs: Family will provide (pt, , and daughter Aminata all share one car).  Name of Transportation Company and Phone: TBD    Additional comments   Pt was alert and forthcoming during visit. Pt feels that this recent ED visit and hospitalization has given her a new perspective on her life, her health, and demonstrated the strength of her  close relationships with her  and daughter. Pt highly motivated to participate in therapy as much as able so that she can return home. Pt shared that she downplayed some health concerns in the past that she now feels she should have addressed.     Pt reported a good appetite and good sleep at TCU.    SW notified pt that discharge date may be dependent on ID f/u appointment on 6/3, but that I will be in communication with her about this if things change. Pt is amenable to staying as long as necessary to meet therapy goals.    SW will continue to remain available for patient and family support, discharge planning, other resources and support PRN.    Juan R JAMA, [facility]   Phone: [phone]

## 2021-05-03 NOTE — PLAN OF CARE
Pt.new admit yesterday. A&Ox4 / pleasant. Uses call light appropriately. Called x1 for assist to BR - CGA / gait belt and walker to BR with assist of 1, gait slow, steady, continent B&B, small soft BM. Sacral mepilex intact. Wound vac dsgs.intact jeevan.groin, y-connected to 125mmHg continuous suction - small amount serosang.drainage in cannister. Pt.reports mild SOB with activity, hx lung CA in remission, resolves shortly after rest and slow,deep breathing. O2 sat 95% RA. Pt.denied pain, discomfort and CP. Has DL open-ended picc - IV abx.        Patient's most recent vital signs are:     Vital signs:  BP: 104/81  Temp: 98.2  HR: 94  RR: 18  SpO2: 95 %     Patient does not have new respiratory symptoms.  Patient does not have new sore throat.  Patient does not have a fever greater than 99.5.

## 2021-05-03 NOTE — PHARMACY-MEDICATION REGIMEN REVIEW
Pharmacy Medication Regimen Review  Maribel Yang is a 70 year old female who is currently in the Transitional Care Unit.    Assessment: Upon review of the medications and patient chart the following irregularities were found:   Antibiotics without durations: cefazolin DOT 6 weeks after last negative blood culture on 4/27/21 = end on 6/8/21  Other Recommendations: lisinopril, Imdur - not BP hold parameters entered  Transition of Care: holding PTA metoprolol d/t recent vasospasm event, re-consider at outpatient cardiology follow-up    Plan:   - please add hold parameters to lisinopril, isosorbide mononitrate   - consider adding stop date to cefazolin for 6/8/21 if clinically appropriate    Attending provider will be sent this note for review.  If there are any emergent issues noted above, pharmacist will contact provider directly by phone.      Pharmacy will periodically review the resident's medication regimen for any PRN medications not administered in > 72 hours and discontinue them. The pharmacist will discuss gradual dose reductions of psychopharmacologic medications with interdisciplinary team on a regular basis.    Please contact pharmacy if the above does not answer specific medication questions/concerns.    Background:  A pharmacist has reviewed all medications and pertinent medical history today.  Medications were reviewed for appropriate use and any irregularities found are listed with recommendations.      Current Facility-Administered Medications:      [START ON 5/5/2021] - Skin Test Reading -, , Does not apply, Q21 Days, Nemesio Rogers MD     acetaminophen (TYLENOL) tablet 650 mg, 650 mg, Oral, Q4H PRN, Nemesio Rogers MD     aspirin (ASA) chewable tablet 81 mg, 81 mg, Oral, Daily, Nemesio Rogers MD, 81 mg at 05/03/21 0806     atorvastatin (LIPITOR) tablet 80 mg, 80 mg, Oral, Daily, Nemesio Rogers MD, 80 mg at 05/03/21 0806     calcium carbonate 600 mg-vitamin D 400 units (CALTRATE) per tablet  1 tablet, 1 tablet, Oral, BID, Nemesio Rogers MD, 1 tablet at 05/03/21 0807     ceFAZolin (ANCEF) intermittent infusion 2 g in 100 mL dextrose PRE-MIX, 2 g, Intravenous, Q12H, Nemesio Rogers MD, Last Rate: 200 mL/hr at 05/03/21 0807, 2 g at 05/03/21 0807     fluticasone (FLONASE) 50 MCG/ACT spray 1 spray, 1 spray, Both Nostrils, Daily, Nemesio Rogers MD, 1 spray at 05/03/21 1122     heparin lock flush 10 UNIT/ML injection 5-10 mL, 5-10 mL, Intracatheter, Q24H, Nemesio Rogers MD     heparin lock flush 10 UNIT/ML injection 5-10 mL, 5-10 mL, Intracatheter, Q1H PRN, Nemesio Rogers MD, 10 mL at 05/03/21 1124     isosorbide mononitrate (IMDUR) 24 hr tablet 60 mg, 60 mg, Oral, Daily, Nemesio Rogers MD, 60 mg at 05/03/21 0806     Lidocaine (LIDOCARE) 4 % Patch 1 patch, 1 patch, Transdermal, Q24H, Nemesio Rogers MD, 1 patch at 05/02/21 2002     lidocaine patch in PLACE, , Transdermal, Q8H, Nemesio Rogers MD     lisinopril (ZESTRIL) tablet 5 mg, 5 mg, Oral, Daily, Nemesio Rogers MD, 5 mg at 05/03/21 0806     methocarbamol (ROBAXIN) tablet 500 mg, 500 mg, Oral, 4x Daily PRN, Nemesio Rogers MD     mycophenolic acid (GENERIC EQUIVALENT) EC tablet 360 mg, 360 mg, Oral, BID, Nemesio Rogers MD, 360 mg at 05/03/21 0807     naloxone (NARCAN) injection 0.2 mg, 0.2 mg, Intravenous, Q2 Min PRN **OR** naloxone (NARCAN) injection 0.4 mg, 0.4 mg, Intravenous, Q2 Min PRN **OR** naloxone (NARCAN) injection 0.2 mg, 0.2 mg, Intramuscular, Q2 Min PRN **OR** naloxone (NARCAN) injection 0.4 mg, 0.4 mg, Intramuscular, Q2 Min PRN, Nemesio Rogers MD     Nurse may request from Pharmacy a change of form of medication (e.g. Liquid to tablet)., , Does not apply, Continuous PRN, Nemesio Rogers MD     ondansetron (ZOFRAN-ODT) ODT tab 4 mg, 4 mg, Oral, Q6H PRN **OR** ondansetron (ZOFRAN) injection 4 mg, 4 mg, Intravenous, Q6H PRN, Nemesio Rogers MD     oxyCODONE (ROXICODONE) tablet 5 mg, 5 mg, Oral, Q6H PRN, Nemesio Rogers MD, 5 mg at  05/03/21 1323     pantoprazole (PROTONIX) EC tablet 40 mg, 40 mg, Oral, QAM AC, Nemesio Rogers MD, 40 mg at 05/03/21 1135     Patient is already receiving anticoagulation with heparin, enoxaparin (LOVENOX), warfarin (COUMADIN)  or other anticoagulant medication, , Does not apply, Continuous PRN, Nemesio Rogers MD     predniSONE (DELTASONE) tablet 5 mg, 5 mg, Oral, Daily, Nemesio Rogers MD, 5 mg at 05/03/21 0806     sodium chloride (PF) 0.9% PF flush 10-20 mL, 10-20 mL, Intracatheter, q1 min prn, Nemesio Rogers MD     ticagrelor (BRILINTA) tablet 90 mg, 90 mg, Oral, Q12H, Nemesio Rogers MD, 90 mg at 05/03/21 0806     tuberculin injection 5 Units, 5 Units, Intradermal, Q21 Days, Nemesio Rogers MD, 5 Units at 05/03/21 1124  No current outpatient prescriptions on file.   PMH: CKD s/p LDKT 2004, recent inferior STEMI s/p RCA stent (4/7/21), and SCC of right lung in remission since 2014, with prior fem-fem bypass s/p EVAR 4/6/21

## 2021-05-03 NOTE — PROGRESS NOTES
[facility]  Patient is currently open to home care services with [facility]. The patient is currently receiving     RN PT OT and SLP  services.  [facility]  and team have been notified of patient admission.  [facility] liaison will continue to follow patient during stay.  If appropriate provide orders to resume home care at time of discharge.

## 2021-05-04 ENCOUNTER — APPOINTMENT (OUTPATIENT)
Dept: LAB | Facility: CLINIC | Age: 71
End: 2021-05-04
Attending: INTERNAL MEDICINE
Payer: COMMERCIAL

## 2021-05-04 LAB
BACTERIA SPEC CULT: NO GROWTH
BACTERIA SPEC CULT: NO GROWTH
LABORATORY COMMENT REPORT: NORMAL
SARS-COV-2 RNA RESP QL NAA+PROBE: NEGATIVE
SPECIMEN SOURCE: NORMAL

## 2021-05-04 PROCEDURE — 97530 THERAPEUTIC ACTIVITIES: CPT | Mod: GP

## 2021-05-04 PROCEDURE — G0463 HOSPITAL OUTPT CLINIC VISIT: HCPCS

## 2021-05-04 PROCEDURE — 97535 SELF CARE MNGMENT TRAINING: CPT | Mod: GO

## 2021-05-04 PROCEDURE — 87635 SARS-COV-2 COVID-19 AMP PRB: CPT | Performed by: INTERNAL MEDICINE

## 2021-05-04 PROCEDURE — 258N000003 HC RX IP 258 OP 636

## 2021-05-04 PROCEDURE — 250N000013 HC RX MED GY IP 250 OP 250 PS 637: Performed by: INTERNAL MEDICINE

## 2021-05-04 PROCEDURE — 250N000012 HC RX MED GY IP 250 OP 636 PS 637: Performed by: INTERNAL MEDICINE

## 2021-05-04 PROCEDURE — 97116 GAIT TRAINING THERAPY: CPT | Mod: GP

## 2021-05-04 PROCEDURE — 120N000009 HC R&B SNF

## 2021-05-04 PROCEDURE — 99307 SBSQ NF CARE SF MDM 10: CPT | Performed by: INTERNAL MEDICINE

## 2021-05-04 PROCEDURE — 250N000011 HC RX IP 250 OP 636: Performed by: INTERNAL MEDICINE

## 2021-05-04 RX ORDER — SODIUM CHLORIDE 9 MG/ML
INJECTION, SOLUTION INTRAVENOUS
Status: COMPLETED
Start: 2021-05-04 | End: 2021-05-04

## 2021-05-04 RX ADMIN — HEPARIN, PORCINE (PF) 10 UNIT/ML INTRAVENOUS SYRINGE 10 ML: at 21:58

## 2021-05-04 RX ADMIN — TICAGRELOR 90 MG: 90 TABLET ORAL at 21:15

## 2021-05-04 RX ADMIN — MYCOPHENOLIC ACID 360 MG: 360 TABLET, DELAYED RELEASE ORAL at 21:15

## 2021-05-04 RX ADMIN — ASPIRIN 81 MG CHEWABLE TABLET 81 MG: 81 TABLET CHEWABLE at 07:58

## 2021-05-04 RX ADMIN — SODIUM CHLORIDE 500 ML: 9 INJECTION, SOLUTION INTRAVENOUS at 21:13

## 2021-05-04 RX ADMIN — FLUTICASONE PROPIONATE 1 SPRAY: 50 SPRAY, METERED NASAL at 07:56

## 2021-05-04 RX ADMIN — CALCIUM CARBONATE 600 MG (1,500 MG)-VITAMIN D3 400 UNIT TABLET 1 TABLET: at 21:15

## 2021-05-04 RX ADMIN — PANTOPRAZOLE SODIUM 40 MG: 40 TABLET, DELAYED RELEASE ORAL at 06:11

## 2021-05-04 RX ADMIN — ACETAMINOPHEN 650 MG: 325 TABLET, FILM COATED ORAL at 07:58

## 2021-05-04 RX ADMIN — CALCIUM CARBONATE 600 MG (1,500 MG)-VITAMIN D3 400 UNIT TABLET 1 TABLET: at 07:57

## 2021-05-04 RX ADMIN — ISOSORBIDE MONONITRATE 60 MG: 60 TABLET, EXTENDED RELEASE ORAL at 07:56

## 2021-05-04 RX ADMIN — SODIUM CHLORIDE, PRESERVATIVE FREE 5 ML: 5 INJECTION INTRAVENOUS at 08:48

## 2021-05-04 RX ADMIN — ATORVASTATIN CALCIUM 80 MG: 40 TABLET, FILM COATED ORAL at 07:57

## 2021-05-04 RX ADMIN — PREDNISONE 5 MG: 5 TABLET ORAL at 07:56

## 2021-05-04 RX ADMIN — LIDOCAINE 1 PATCH: 560 PATCH PERCUTANEOUS; TOPICAL; TRANSDERMAL at 21:14

## 2021-05-04 RX ADMIN — LISINOPRIL 5 MG: 5 TABLET ORAL at 07:57

## 2021-05-04 RX ADMIN — CEFAZOLIN SODIUM 2 G: 2 INJECTION, SOLUTION INTRAVENOUS at 08:01

## 2021-05-04 RX ADMIN — MYCOPHENOLIC ACID 360 MG: 360 TABLET, DELAYED RELEASE ORAL at 07:57

## 2021-05-04 RX ADMIN — TICAGRELOR 90 MG: 90 TABLET ORAL at 07:58

## 2021-05-04 RX ADMIN — CEFAZOLIN SODIUM 2 G: 2 INJECTION, SOLUTION INTRAVENOUS at 21:13

## 2021-05-04 NOTE — PLAN OF CARE
Alert and oriented x4. Makes needs known. Denied any pain or new concerns overnight. Notes she slept off and on in between encounters. Wound-vac with no issues overnight, continuous suction at 125 mmHg. CGA with a walker for ambulation to the bathroom, called appropriately for assistance. Lbm 05/03. Continue POC.       Patient's most recent vital signs are:     Vital signs:  BP: 116/87  Temp: 98  HR: 95  RR: 16  SpO2: 97 %     Patient does not have new respiratory symptoms.  Patient does not have new sore throat.  Patient does not have a fever greater than 99.5.

## 2021-05-04 NOTE — PLAN OF CARE
Alert and oriented, VSS. Reported a headache, relieved w/ PRN tylenol. Reports PIERCE. Asymptomatic COVID swab done. Appetite good. Promoted fluid intake. Alessandro. groin VAC dressings intact, changed cannister after it alarmed that it was full. Sacral mepilex in place, blanchable redness stable and visualized by WOCN this shift. Pt repositions on her own and agreeable to side lying. Applied tubi  to BLE for 2+ edema. Continent of urine. LBM 5/3. Napping most of day, declined scheduled OT d/t back-to-back sessions.       Patient's most recent vital signs are:     Vital signs:  BP: 113/87  Temp: 97.3  HR: 87  RR: 20  SpO2: 97 %     Patient does not have new respiratory symptoms.  Patient does not have new sore throat.  Patient does not have a fever greater than 99.5.

## 2021-05-04 NOTE — PROGRESS NOTES
"[facility] Nurse Inpatient Wound Assessment   Reason for consultation: Evaluate and treat Bilateral groin and buttocks wounds    Assessment  Bilateral groin wounds due to Surgical Wound  Status: initial assessment    5/4: Only assessed buttocks  Buttocks no pressure injury at this time    Treatment Plan  Bilateral groin wound: Negative Pressure Wound Therapy (NPWT) to be changed by WOC RN every Mon/Wed/Fri with small  black foam. Staff RN to assess pump settings set to -125 and dressing integrity every shift. Remove foam dressing and replace with BID normal saline moist gauze dressing if:  -a dressing failure which cannot be repaired within 2 hours  -patient is discharging to home without a home pump  -patient is discharging to a facility outside the local area  -if a dressing is a \"Silver Foam\", remove before Radiation Therapy or MRI    Buttocks prevention plan:  Cleanse the area with NS and pat dry.  Apply No sting film barrier to periwound skin.  Cover wound with Mepilex. Sacral Mepilex (#[id number])  Change dressing Q 3 days.  Turn and reposition Q 2hrs.  Ensure pt has Luis-cushion while sitting up in the chair.  FYI- If pt has constant incontinent loose stools needing dressing changes Q shift please discontinue the Mepilex dressing and apply criticaid barrier paste BID and PRN.      Orders Written  Recommended provider order: None, at this time  [facility] Nurse follow-up plan:Monday/WednesdayFriday  Nursing to notify the Provider(s) and re-consult the [facility] Nurse if wound(s) deteriorates or new skin concern.    Patient History  According to provider note(s):    71 yo F s/p EVAR AUI and left to right femoral to femoral bypass graft for 5.9 cm AAA. Initial post-op course was complicated by inferior STEMI requiring emergent cardiac catheterization and PCI to RCA. Patient presenting on 4/23/2021 to ED with subjective fevers and chills and erythema and drainage from right groin wound.      4/24/2021 - Incision and drainage of right " groin wound; packing of the wound with Adaptic, moistened Kerlix gauze, ABD pad     4/25/2021 - Explantation of femoral to femoral bypass graft; femoral to femoral bypass graft using cadaveric homograft (tunneled retropubic); bilateral sartorius muscle flaps; placement of negative pressure wound vac therapy.     4/26/2021 - Cardiac catheterization for post-op EKG changes; RCA stent patent; diffuse vasospasm of LAD, LCx.  Returned to OR for bilateral groin washout and placement of Veraflo wound vac system.    Objective Data  Containment of urine/stool: Continent of bladder and Continent of bowel    Active Diet Order  Orders Placed This Encounter      Regular Diet Adult      Output:   I/O last 3 completed shifts:  In: 480 [P.O.:480]  Out: -     Risk Assessment:   Sensory Perception: 4-->no impairment  Moisture: 4-->rarely moist  Activity: 3-->walks occasionally  Mobility: 3-->slightly limited  Nutrition: 2-->probably inadequate  Friction and Shear: 2-->potential problem  Juan Luis Score: 18                          Labs:   Recent Labs   Lab 04/30/21  0453   HGB 8.1*   WBC 14.1*       Physical Exam  Areas of skin assessed: focused buttocks and bilateral groin    Wound Location:  Bilateral groin    Right groin    Left groin    Date of last photo 5/3  Wound History: See above, surgical wounds per vascular surgery.      Vac placed using ostomy barrier ring to protect sutures and prevent leaking, window paned around wound to protect skin.     Right groin: 8 x 6 x 1.8 cm  Tunneling up to 2.5 cm at 11 o'clock, 3.5 cm at 3 o'clock and 1.5 at 5 o'clock  90% clean minimally granulating base, 10% scattered slough  Left groin 1.5 x 6 x 1 cm  Tunneling up to 3 cm at 11 o'clock and 2.5 at 3 o'clock  Clean minimally granulating base    Buttocks: Superficial skin tear and healed blanchable erythema to buttocks.  Not a pressure injury at this time.           Interventions  Visual inspection and assessment completed   Wound Care Rationale  Promote moist wound healing without tissue dehydration , Gently fill dead space to prevent abscess formation  and Provide protection   Wound Care: done per plan of care  Supplies: floor stock, discussed with RN and discussed with patient  Current off-loading measures: Pillows  Current support surface: Standard  Atmos Air mattress  Education provided to: importance of repositioning, plan of care, wound progress and Infection prevention   Discussed plan of care with Patient and Nurse    Janice Clarke RN CWOCN

## 2021-05-04 NOTE — PROGRESS NOTES
05/04/21 1100   General Information   Patient Profile Review See Profile for full history and prior level of function   Daily Contact with Relatives or Friends Phone call   Community Involvement   Community Involvement Retired   Spiritual Practice Scientology   [facility] ? No   Hobbies/Interests   Games Other (comments)  (Backgammon)   Word Puzzles Other (see comments)  (on phone)   Craft/Art Crocheting   Music   Music Preferences R&B;Spiritual   Listens to Recorded Music Yes   Brought Own Equipment Yes   Media   Newspapers Daily   Computer Has own at home;Will use tablet/phone   TV / Movies TV   Sports / Physical Activities   Sports/Exercise Walks   Impression   Open to Socializing with Others Independent   Barriers to Leisure Endurance   Treatment Plan   Interested in [facility]? No   Type of Intervention Independent with activity   Assessment Therapeutic Recreation: Assessment completed. Pt is pleasant with a good sense of humor. Pt is independent with phone calls to family. Oriented pt to leisure materials available to her, pt declined at this time. Pt is interested in getting daily newspaper, pt was told to talk to Ethan each morning and request from him/her. Will provide check in for materials as needed.

## 2021-05-04 NOTE — PROGRESS NOTES
Pt was seen, case reviewed with Dr Rogers    Pt feels stronger, notes mild PIERCE  No chest pain, cough, abd pain  Pt notes intermittent B LE edema    Afebrile  BP 110s/  HR 80s  02 sats upper 90s RA    Alert, fully oriented, pale appearing  Lungs clear  CV rrr  Abd soft  No ankle edema this am        Assessment     70 year old year old female with hx of  CKD s/p LDKT 2004, recent inferior STEMI s/p RCA stent (4/7/21), and SCC of right lung in remission since 2014, with prior fem-fem bypass s/p EVAR 4/6/21 is being admitted to  TCU on 05/02/2021 after hospitalization at [facility] from 04/23/2021 to 05/02/2021. She was admitted with sepsis 2/2 right femoral groin access site and perigraft abscess. She underwent  groin washout, sartorius muscle flap and redo fem fem bypass with cadaveric artery with 1L EBL on 04/25. Hospital course complicated by  inferior ST elevations, and taken to cath lab, found to have diffuse coronary vasospasm with complete occlusion of RCA that reopened with nitroglycerin.         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 Persistently elevated white count. (may be related to prednisone)  Wound vac in place     - Continue cefazolin 2g IV q12h for MSSA treatment. Anticipate minimum of 6 weeks from clearance of bacteremia (first negative blood culture). Anticipated duration: 4/27-6/8  - Wound care consult.   - Monitor CBC w/ diff and CMP weekly. If Creatinine clearance increases > 50 ml/min,  increase Cefazolin dose to 2gm IV q8h  - Follow up with  Dr. Flores on 6/3 to establish final duration of antibiotics and other follow-up needs.  - Follow up with Vascular surgery (vascular surgery will follow up on that)       # Coronary vasospasm  # Hx of Inferior STEMI s/p LIUDMILA to RCA  # HFrEF with an EF 35-40%  Post operatively was found to have inferior ST elevations and taken to cath lab, found to have diffuse coronary vasospasm with complete occlusion of RCA that reopened with NTG. TTE 4/26 showed EF 35-40% with unchanged inferior WMAs, troponin peaked at 29.5  - Continue lisinopril 5 mg daily, increase as tolerated  - Avoid BB given recent Vasospasm event until she follows up as an outpatient  - Avoid non DHP CCBs (Verapamil, Diltiazem) given HFrEF. Can start Amlodipine 5mg daily in the future if necessary to prevent further vasospasm   - Imdur 60mg daily  - Continue PO ASA 81mg, continue PO Brilinta 90mg BID  - Hematology has recommended lifelong DOAC for recurrent DVT. When safe from a surgical standpoint can discontinue aspirin and restart eliquis and continue ticagrelor 90mg BID.   - Continue PO Lipitor 80mg qday   - Aldosterone blockade for CAD/STEMI ischemic cardiomyopathy     # s/p LDKT 9/20/2004: Stable kidney function, now below baseline, likely due to loss of muscle mass recently  On MMF, Prednisone  - Continue  - CMP weekly  - Avoid Nephrotoxins  - Renal dose medications.         # Hypertension: Borderline control.Goal BP: < 130/80  - Monitor      # Anemia: Likely d/t blood loss, and Chronic Renal Disease  Hg 8.1 gm/dl on 4/30  - Continue to monitor weekly     # Cephalic vein thrombosis  # Superficial hematoma in the left distal forearm  Plan for conservative management with  dual antiplatelet therapy      Rob Warner MD

## 2021-05-04 NOTE — PLAN OF CARE
"Discharge Planner Post-Acute Rehab OT:     Discharge Plan: home with spouse/daughter A as needed. HHOT    Precautions: fall, wound vac     Current Status:  ADLs: CGA for g/h in standing at sink, max A for LE dressing, set- up A for UE dressing, min A for toilet transfer, mod A for pericares.     IADLs: spouse and daughter A at baseline but pt would like to be IND with simple meal prep and house keeping.     Vision/Cognition: appears intact. Will continue to assess functional cog.     Assessment: Unable to see patient during AM session d/t 2/2 fatigue from PT which was directly prior to OT session. Returned to pt in PM. Pt had just completed toileting with nursing and returned to bed. Pt declined any activity d/t fatigue. Pt SOB yet VSS stable. Discussed current POC and goals for patient. Pt reports being \"goal oriented\". Pt's main goals are to increase strength and endurance, complete LB dressing with Mod I, and amb to/from BR with FWW and Mod I. Pt will benefit from skilled OT to progress IND with ADL/IADL's and functional transfers as pt is below baseline function. Supportive spouse and daughter able to A at discharge.    Other Barriers to Discharge (DME, Family Training, etc): pt has several stairs at home (see PT notes). Will need shower chair vs tub bench and pt requesting grab bar by toilet vs RTS.     "

## 2021-05-04 NOTE — PLAN OF CARE
VSS. Alert and orientedx4. Pleasant and cooperative. Wound vac to bilateral groin dressing CDI, no issue. Able to communicate needs. Assisted with turning and repositioning. Daughter came and visited. Gold wipes done. PICC patent, cont on IV antibiotic. Sacral mepilex CDI. Lidocaine patchx1 to LFA. Last Bm today. A-1 for transfer and toileting.  BLE's edema shaheed to feet, tubigrips removed at bedtime per pts request, keep legs elevated with pillow  Will continue plan of care.      Patient's most recent vital signs are:     Vital signs:  BP: 116/87  Temp: 98  HR: 95  RR: 16  SpO2: 97 %     Patient does not have new respiratory symptoms.  Patient does not have new sore throat.  Patient does not have a fever greater than 99.5.

## 2021-05-05 LAB
ALBUMIN SERPL-MCNC: 1.6 G/DL (ref 3.4–5)
ALP SERPL-CCNC: 103 U/L (ref 40–150)
ALT SERPL W P-5'-P-CCNC: <6 U/L (ref 0–50)
ANION GAP SERPL CALCULATED.3IONS-SCNC: 8 MMOL/L (ref 3–14)
AST SERPL W P-5'-P-CCNC: 13 U/L (ref 0–45)
BACTERIA SPEC CULT: ABNORMAL
BACTERIA SPEC CULT: NO GROWTH
BACTERIA SPEC CULT: NO GROWTH
BASOPHILS # BLD AUTO: 0 10E9/L (ref 0–0.2)
BASOPHILS NFR BLD AUTO: 0.3 %
BILIRUB SERPL-MCNC: 0.2 MG/DL (ref 0.2–1.3)
BUN SERPL-MCNC: 19 MG/DL (ref 7–30)
CALCIUM SERPL-MCNC: 8.4 MG/DL (ref 8.5–10.1)
CHLORIDE SERPL-SCNC: 109 MMOL/L (ref 94–109)
CO2 SERPL-SCNC: 22 MMOL/L (ref 20–32)
CREAT SERPL-MCNC: 1.19 MG/DL (ref 0.52–1.04)
CRP SERPL-MCNC: 21 MG/L (ref 0–8)
DIFFERENTIAL METHOD BLD: ABNORMAL
EOSINOPHIL # BLD AUTO: 0.1 10E9/L (ref 0–0.7)
EOSINOPHIL NFR BLD AUTO: 0.9 %
ERYTHROCYTE [DISTWIDTH] IN BLOOD BY AUTOMATED COUNT: 20.2 % (ref 10–15)
GFR SERPL CREATININE-BSD FRML MDRD: 47 ML/MIN/{1.73_M2}
GLUCOSE SERPL-MCNC: 89 MG/DL (ref 70–99)
HCT VFR BLD AUTO: 27.4 % (ref 35–47)
HGB BLD-MCNC: 8.4 G/DL (ref 11.7–15.7)
IMM GRANULOCYTES # BLD: 0.1 10E9/L (ref 0–0.4)
IMM GRANULOCYTES NFR BLD: 0.7 %
LYMPHOCYTES # BLD AUTO: 0.4 10E9/L (ref 0.8–5.3)
LYMPHOCYTES NFR BLD AUTO: 3.8 %
Lab: NORMAL
Lab: NORMAL
MCH RBC QN AUTO: 28.9 PG (ref 26.5–33)
MCHC RBC AUTO-ENTMCNC: 30.7 G/DL (ref 31.5–36.5)
MCV RBC AUTO: 94 FL (ref 78–100)
MONOCYTES # BLD AUTO: 0.9 10E9/L (ref 0–1.3)
MONOCYTES NFR BLD AUTO: 8 %
NEUTROPHILS # BLD AUTO: 9.8 10E9/L (ref 1.6–8.3)
NEUTROPHILS NFR BLD AUTO: 86.3 %
NRBC # BLD AUTO: 0 10*3/UL
NRBC BLD AUTO-RTO: 0 /100
PLATELET # BLD AUTO: 396 10E9/L (ref 150–450)
POTASSIUM SERPL-SCNC: 3.7 MMOL/L (ref 3.4–5.3)
PROT SERPL-MCNC: 4.9 G/DL (ref 6.8–8.8)
RBC # BLD AUTO: 2.91 10E12/L (ref 3.8–5.2)
SODIUM SERPL-SCNC: 139 MMOL/L (ref 133–144)
SPECIMEN SOURCE: ABNORMAL
SPECIMEN SOURCE: NORMAL
SPECIMEN SOURCE: NORMAL
WBC # BLD AUTO: 11.3 10E9/L (ref 4–11)

## 2021-05-05 PROCEDURE — 120N000009 HC R&B SNF

## 2021-05-05 PROCEDURE — 97530 THERAPEUTIC ACTIVITIES: CPT | Mod: GP | Performed by: REHABILITATION PRACTITIONER

## 2021-05-05 PROCEDURE — 250N000011 HC RX IP 250 OP 636: Performed by: INTERNAL MEDICINE

## 2021-05-05 PROCEDURE — 258N000003 HC RX IP 258 OP 636

## 2021-05-05 PROCEDURE — 97110 THERAPEUTIC EXERCISES: CPT | Mod: GO

## 2021-05-05 PROCEDURE — 99232 SBSQ HOSP IP/OBS MODERATE 35: CPT | Mod: 24 | Performed by: NURSE PRACTITIONER

## 2021-05-05 PROCEDURE — 36592 COLLECT BLOOD FROM PICC: CPT | Performed by: INTERNAL MEDICINE

## 2021-05-05 PROCEDURE — 250N000013 HC RX MED GY IP 250 OP 250 PS 637: Performed by: INTERNAL MEDICINE

## 2021-05-05 PROCEDURE — 97110 THERAPEUTIC EXERCISES: CPT | Mod: GP | Performed by: REHABILITATION PRACTITIONER

## 2021-05-05 PROCEDURE — 250N000012 HC RX MED GY IP 250 OP 636 PS 637: Performed by: INTERNAL MEDICINE

## 2021-05-05 PROCEDURE — 85025 COMPLETE CBC W/AUTO DIFF WBC: CPT | Performed by: INTERNAL MEDICINE

## 2021-05-05 PROCEDURE — 999N000197 HC STATISTIC WOC PT EDUCATION, 0-15 MIN

## 2021-05-05 PROCEDURE — 97535 SELF CARE MNGMENT TRAINING: CPT | Mod: GO

## 2021-05-05 PROCEDURE — 86140 C-REACTIVE PROTEIN: CPT | Performed by: INTERNAL MEDICINE

## 2021-05-05 PROCEDURE — 80053 COMPREHEN METABOLIC PANEL: CPT | Performed by: INTERNAL MEDICINE

## 2021-05-05 RX ORDER — SODIUM CHLORIDE 9 MG/ML
INJECTION, SOLUTION INTRAVENOUS
Status: COMPLETED
Start: 2021-05-05 | End: 2021-05-05

## 2021-05-05 RX ADMIN — CEFAZOLIN SODIUM 2 G: 2 INJECTION, SOLUTION INTRAVENOUS at 09:01

## 2021-05-05 RX ADMIN — LIDOCAINE 1 PATCH: 560 PATCH PERCUTANEOUS; TOPICAL; TRANSDERMAL at 21:23

## 2021-05-05 RX ADMIN — PREDNISONE 5 MG: 5 TABLET ORAL at 09:09

## 2021-05-05 RX ADMIN — CEFAZOLIN SODIUM 2 G: 2 INJECTION, SOLUTION INTRAVENOUS at 20:34

## 2021-05-05 RX ADMIN — SODIUM CHLORIDE, PRESERVATIVE FREE 5 ML: 5 INJECTION INTRAVENOUS at 07:14

## 2021-05-05 RX ADMIN — PANTOPRAZOLE SODIUM 40 MG: 40 TABLET, DELAYED RELEASE ORAL at 06:30

## 2021-05-05 RX ADMIN — FLUTICASONE PROPIONATE 1 SPRAY: 50 SPRAY, METERED NASAL at 09:09

## 2021-05-05 RX ADMIN — LISINOPRIL 5 MG: 5 TABLET ORAL at 09:25

## 2021-05-05 RX ADMIN — SODIUM CHLORIDE 500 ML: 9 INJECTION, SOLUTION INTRAVENOUS at 20:34

## 2021-05-05 RX ADMIN — ASPIRIN 81 MG CHEWABLE TABLET 81 MG: 81 TABLET CHEWABLE at 09:09

## 2021-05-05 RX ADMIN — MYCOPHENOLIC ACID 360 MG: 360 TABLET, DELAYED RELEASE ORAL at 21:19

## 2021-05-05 RX ADMIN — TICAGRELOR 90 MG: 90 TABLET ORAL at 21:19

## 2021-05-05 RX ADMIN — ATORVASTATIN CALCIUM 80 MG: 40 TABLET, FILM COATED ORAL at 09:10

## 2021-05-05 RX ADMIN — TICAGRELOR 90 MG: 90 TABLET ORAL at 09:10

## 2021-05-05 RX ADMIN — CALCIUM CARBONATE 600 MG (1,500 MG)-VITAMIN D3 400 UNIT TABLET 1 TABLET: at 09:09

## 2021-05-05 RX ADMIN — MYCOPHENOLIC ACID 360 MG: 360 TABLET, DELAYED RELEASE ORAL at 09:09

## 2021-05-05 RX ADMIN — CALCIUM CARBONATE 600 MG (1,500 MG)-VITAMIN D3 400 UNIT TABLET 1 TABLET: at 21:19

## 2021-05-05 RX ADMIN — ISOSORBIDE MONONITRATE 60 MG: 60 TABLET, EXTENDED RELEASE ORAL at 09:25

## 2021-05-05 RX ADMIN — HEPARIN, PORCINE (PF) 10 UNIT/ML INTRAVENOUS SYRINGE 10 ML: at 21:19

## 2021-05-05 RX ADMIN — OXYCODONE HYDROCHLORIDE 5 MG: 5 TABLET ORAL at 08:05

## 2021-05-05 NOTE — PLAN OF CARE
Discharge Planner Post-Acute Rehab OT:     Discharge Plan: home with spouse/daughter A as needed. HHOT    Precautions: fall, wound vac     Current Status:  ADLs: CGA for g/h in standing at sink, min A for shorts during LE dressing, max A slipper socks, set- up A for UE dressing, min A for toilet transfer, mod A for pericares.     IADLs: spouse and daughter A at baseline but pt would like to be IND with simple meal prep and house keeping.     Vision/Cognition: appears intact. Will continue to assess functional cog.     Assessment:  Pt had wound vac change right before OT.  She reports that she feels a bit off from the pain meds and still had pain in abd but is willing to participate in OT.  Th set pt up for donning shorts in bed, she needed min A and extra time and cues for threading shorts over feet and then extra time for reclined position to sitting EOB, CGA sit to stand and to pull up shorts with use of 2WW.  SBA for sit to supine but pt using inclined bed and bed rail.  Max A for supine scoot due to weakness.  Progress noted with donning shorts, will con't to consider AE for LB dressing.    Calesthenics seated EOB sitting unsupported, pt became fatigued and finished ex supported in bed.  She tolerated up to 20 reps or 1 minute ex with rests in between exericises.    Other Barriers to Discharge (DME, Family Training, etc): pt has several stairs at home (see PT notes). Will need shower chair vs tub bench and pt requesting grab bar by toilet vs RTS.

## 2021-05-05 NOTE — PLAN OF CARE
VSS. Alert and orientedx4. Pleasant and cooperative. Able to used call light and verbalized needs. A-1 for transfer and toileting. Ambulated to from the bathroom. +BM today continent of both B/B. Oxycodone given per pts request prior wound vac changes. PICC patent, on IV ancef.  Sacral mepilex CDI. Wound vac changed this am by intravascular provider. REgular diet, appetite good. Wound vac CDI, no alarms, cannister changed.  Will continue plan of lynch.      Patient's most recent vital signs are:     Vital signs:  BP: 108/84  Temp: 96.2  HR: 97  RR: 17  SpO2: 96 %     Patient does not have new respiratory symptoms.  Patient does not have new sore throat.  Patient does not have a fever greater than 99.5.

## 2021-05-05 NOTE — PROGRESS NOTES
"WOC changing VAC MWF. Presented for VAC change this morning. Jessica Bunn NP with Vascular Surgery came this AM to change VAC. Note reviewed and ask to \"do not use paste over suture line and do not change dressing plan without approval of Vascular Surgery\" noted.     WOC will continue with VAC changes on 5/7.    Dolores Francis RN, CWOCN  "

## 2021-05-05 NOTE — PLAN OF CARE
Alert and oriented x4. Denied any pain. Asleep off and on in between cares. Wound-vac with no issues overnight, continuous suction at 125 mmHg. CGA with a walker for ambulation to the bathroom, called appropriately for assistance. Lb 05/04. Continue POC.       Patient's most recent vital signs are:     Vital signs:  BP: 112/81  Temp: 97.7  HR: 93  RR: 16  SpO2: 95 %     Patient does not have new respiratory symptoms.  Patient does not have new sore throat.  Patient does not have a fever greater than 99.5.

## 2021-05-05 NOTE — PLAN OF CARE
Pt alert and oriented x4. Able to make needs known. Denies pain, cp or SOB. Daughter at bedside till bedtime. IV abx infused without difficulties. Wound vac intact running at -125mmHg, no alarms noted or reported. A1 w/ walker and GB. LBM 5/3. Call light in reach, continue w/ POC.      Patient's most recent vital signs are:     Vital signs:  BP: 112/81  Temp: 97.7  HR: 93  RR: 16  SpO2: 95 %     Patient does not have new respiratory symptoms.  Patient does not have new sore throat.  Patient does not have a fever greater than 99.5.

## 2021-05-05 NOTE — PLAN OF CARE
"Discharge Planner Post-Acute Rehab PT:     Discharge Plan: home with assist from spouse and HH PT     Precautions: falls, woc vac (sites located on ant pelvis 2x near ASISs)    Current Status:  Bed Mobility: CGA to slight min A   Transfer: NT today - 5/4 STS FWW CGA  Gait: NT AM 5/4FWW, 160', CGA, w/c follow  Stairs: NT AM 5/4 up/down 6x6\" steps B rails CGA  Balance: SBA     Assessment:  pt stating just took pain meds and not feeling very good at this time so not wanting to get up. Pt willing to demo supine TE. Pt able to progress with seated TE at EOB. Will progress to OOB this PM     Other Barriers to Discharge (DME, Family Training, etc): family training  "

## 2021-05-05 NOTE — PROGRESS NOTES
VASCULAR SURGERY PROGRESS NOTE    Subjective:  Found ambulating in room with walker and nursing assist.  Reports she is eating well, tolerating wound vacs and IV antibiotics.  No specific complaints at this time    Objective:    Intake/Output Summary (Last 24 hours) at 5/5/2021 0917  Last data filed at 5/4/2021 1900  Gross per 24 hour   Intake 480 ml   Output 900 ml   Net -420 ml     Labs:  ROUTINE IP LABS (Last four results)  BMP  Recent Labs   Lab 05/05/21 0715 04/30/21 0453 04/29/21 0519    140 141   POTASSIUM 3.7 3.5 3.8   CHLORIDE 109 112* 111*   KAYKAY 8.4* 8.0* 8.2*   CO2 22 20 23   BUN 19 35* 36*   CR 1.19* 1.20* 1.35*   GLC 89 79 89     CBC  Recent Labs   Lab 05/05/21 0715 04/30/21 0453 04/29/21 0519   WBC 11.3* 14.1* 14.0*   RBC 2.91* 2.86* 2.73*   HGB 8.4* 8.1* 7.5*   HCT 27.4* 26.4* 24.9*   MCV 94 92 91   MCH 28.9 28.3 27.5   MCHC 30.7* 30.7* 30.1*   RDW 20.2* 18.6* 18.1*    297 264     INRNo lab results found in last 7 days.  PHYSICAL EXAM:  /84 (BP Location: Right leg)   Pulse 97   Temp 96.2  F (35.7  C) (Oral)   Resp 17   Wt 48.5 kg (107 lb)   SpO2 96%   BMI 19.57 kg/m    General: The patient is alert and oriented. Appropriate. No acute distress  Psych: pleasant affect, answers questions appropriately  Skin: Color appropriate for race, warm, dry.  Respiratory: The patient does not require supplemental oxygen. Breathing unlabored  GI:  Abdomen soft, nontender to light palpation.  Extremities: Right groin wounds with some surrounding maceration, wound beds appear clean and pink, small area of fibrinous slough on right lateral groin wound.  Ecchymosis resolving                    ASSESSMENT:  69 yo F s/p EVAR AUI and left to right femoral to femoral bypass graft for 5.9 cm AAA. Initial post-op course was complicated by inferior STEMI requiring emergent cardiac catheterization and PCI to RCA. Patient presenting on 4/23/2021 to ED with subjective fevers and chills and erythema  and drainage from right groin wound.      4/24/2021 - Incision and drainage of right groin wound; packing of the wound with Adaptic, moistened Kerlix gauze, ABD pad     4/25/2021 - Explantation of femoral to femoral bypass graft; femoral to femoral bypass graft using cadaveric homograft (tunneled retropubic); bilateral sartorius muscle flaps; placement of negative pressure wound vac therapy.      4/26/2021 - Cardiac catheterization for post-op EKG changes; RCA stent patent; diffuse vasospasm of LAD, LCx.  Returned to OR for bilateral groin washout and placement of Veraflo wound vac system.      PLAN:  -Continue MWF dressing changes, appreciate WOC assistance  -Please do not use paste over suture lines and do not change dressing plan without prior approval of vascular surgery.  -Aspririn, Brillinta, and Atorvastatin  -IV antibiotics per ID recs  -Please contact with additional questions      Jessica Bunn, DNP, APRN, AGNP-C  Division of Vascular Surgery   [facility]  Pager: [phone]

## 2021-05-06 ENCOUNTER — APPOINTMENT (OUTPATIENT)
Dept: PHYSICAL THERAPY | Facility: SKILLED NURSING FACILITY | Age: 71
End: 2021-05-06
Payer: COMMERCIAL

## 2021-05-06 ENCOUNTER — APPOINTMENT (OUTPATIENT)
Dept: OCCUPATIONAL THERAPY | Facility: SKILLED NURSING FACILITY | Age: 71
End: 2021-05-06
Payer: COMMERCIAL

## 2021-05-06 PROCEDURE — 97110 THERAPEUTIC EXERCISES: CPT | Mod: GO

## 2021-05-06 PROCEDURE — 250N000013 HC RX MED GY IP 250 OP 250 PS 637: Performed by: INTERNAL MEDICINE

## 2021-05-06 PROCEDURE — 97116 GAIT TRAINING THERAPY: CPT | Mod: GP

## 2021-05-06 PROCEDURE — 120N000009 HC R&B SNF

## 2021-05-06 PROCEDURE — 258N000003 HC RX IP 258 OP 636

## 2021-05-06 PROCEDURE — 250N000012 HC RX MED GY IP 250 OP 636 PS 637: Performed by: INTERNAL MEDICINE

## 2021-05-06 PROCEDURE — 97535 SELF CARE MNGMENT TRAINING: CPT | Mod: GO

## 2021-05-06 PROCEDURE — 97110 THERAPEUTIC EXERCISES: CPT | Mod: GP

## 2021-05-06 PROCEDURE — 250N000011 HC RX IP 250 OP 636: Performed by: INTERNAL MEDICINE

## 2021-05-06 RX ORDER — SODIUM CHLORIDE 9 MG/ML
INJECTION, SOLUTION INTRAVENOUS
Status: COMPLETED
Start: 2021-05-06 | End: 2021-05-06

## 2021-05-06 RX ADMIN — FLUTICASONE PROPIONATE 1 SPRAY: 50 SPRAY, METERED NASAL at 09:35

## 2021-05-06 RX ADMIN — CALCIUM CARBONATE 600 MG (1,500 MG)-VITAMIN D3 400 UNIT TABLET 1 TABLET: at 21:18

## 2021-05-06 RX ADMIN — HEPARIN, PORCINE (PF) 10 UNIT/ML INTRAVENOUS SYRINGE 10 ML: at 21:18

## 2021-05-06 RX ADMIN — CEFAZOLIN SODIUM 2 G: 2 INJECTION, SOLUTION INTRAVENOUS at 09:36

## 2021-05-06 RX ADMIN — CEFAZOLIN SODIUM 2 G: 2 INJECTION, SOLUTION INTRAVENOUS at 20:37

## 2021-05-06 RX ADMIN — ASPIRIN 81 MG CHEWABLE TABLET 81 MG: 81 TABLET CHEWABLE at 09:33

## 2021-05-06 RX ADMIN — SODIUM CHLORIDE 500 ML: 9 INJECTION, SOLUTION INTRAVENOUS at 09:35

## 2021-05-06 RX ADMIN — MYCOPHENOLIC ACID 360 MG: 360 TABLET, DELAYED RELEASE ORAL at 21:18

## 2021-05-06 RX ADMIN — TICAGRELOR 90 MG: 90 TABLET ORAL at 09:33

## 2021-05-06 RX ADMIN — LISINOPRIL 5 MG: 5 TABLET ORAL at 09:33

## 2021-05-06 RX ADMIN — PREDNISONE 5 MG: 5 TABLET ORAL at 09:33

## 2021-05-06 RX ADMIN — MYCOPHENOLIC ACID 360 MG: 360 TABLET, DELAYED RELEASE ORAL at 09:34

## 2021-05-06 RX ADMIN — SODIUM CHLORIDE, PRESERVATIVE FREE 10 ML: 5 INJECTION INTRAVENOUS at 10:42

## 2021-05-06 RX ADMIN — CALCIUM CARBONATE 600 MG (1,500 MG)-VITAMIN D3 400 UNIT TABLET 1 TABLET: at 09:33

## 2021-05-06 RX ADMIN — TICAGRELOR 90 MG: 90 TABLET ORAL at 21:18

## 2021-05-06 RX ADMIN — ISOSORBIDE MONONITRATE 60 MG: 60 TABLET, EXTENDED RELEASE ORAL at 09:34

## 2021-05-06 RX ADMIN — LIDOCAINE 1 PATCH: 560 PATCH PERCUTANEOUS; TOPICAL; TRANSDERMAL at 21:18

## 2021-05-06 RX ADMIN — PANTOPRAZOLE SODIUM 40 MG: 40 TABLET, DELAYED RELEASE ORAL at 05:42

## 2021-05-06 RX ADMIN — ATORVASTATIN CALCIUM 80 MG: 40 TABLET, FILM COATED ORAL at 09:34

## 2021-05-06 NOTE — PLAN OF CARE
No acute issues. Appears to be sleeping well. Uses call light appropriately. Assist of 1 / GB and walker to BR. Alessandro.groin vac dsgs intact and patent @ 125mmHg continuous suction. Offers no c/o's as of yet tonight. Continent B&B, medium and small soft>formed BM tonight.        Patient's most recent vital signs are:     Vital signs:  BP: 109/89  Temp: 97  HR: 98  RR: 16  SpO2: 95 %     Patient does not have new respiratory symptoms.  Patient does not have new sore throat.  Patient does not have a fever greater than 99.5.

## 2021-05-06 NOTE — PLAN OF CARE
VS: VSS - denies SOB, chest pain, headache and nausea    O2: Sat 98% RA    Output: Continent    Last BM: 05/06   Activity: Assist of one with walker and gait belt    Skin: Intact with exception of wound vac x2 to bilateral groin. Sacral area cleansed and applied new Mepilex for protection    Pain: Denies of pain and discomfort   CMS: Intact, denies numbness and tingling to hands and feet   Dressing: Wound vac dressings x2 CDI  LUE double lumen PICC dressing CDI   Sacral Mepilex changed this am   Diet: Reg    LDA: Double lumen PICC - patent and hep locked  Wound vac (two wounds connected to Y, one wound vac)   Equipment: Wound vac, IV pole and walker    Plan: Continue w/ POC    Additional Info: Pt is alert and oriented x4. Bed alarm on for safety. Wound vac cannister changed this am. Call light within reach and able to make needs known.              Patient's most recent vital signs are:     Vital signs:  BP: 116/86  Temp: 97.2  HR: 88  RR: 16  SpO2: 98 %     Patient does not have new respiratory symptoms.  Patient does not have new sore throat.  Patient does not have a fever greater than 99.5.

## 2021-05-06 NOTE — PLAN OF CARE
Discharge Planner Post-Acute Rehab PT:     Discharge Plan: home with assist from spouse and HH PT     Precautions: falls, woc vac (sites located on ant pelvis 2x near ASISs)    Current Status:  Bed Mobility: CGA to slight min A   Transfer:  STS FWW SBA  Gait: FWW, 160', CGA, w/c follow  Stairs: up/down 6 stairs x2 reps with B rails CGA, seated rest after each set  Balance: SBA     Assessment:  PTA: Pt able to amb to gym and back.  Performed stairs and seated exercises.  Limited by fatigue and SOB.    Other Barriers to Discharge (DME, Family Training, etc): family training

## 2021-05-06 NOTE — PLAN OF CARE
Discharge Planner Post-Acute Rehab OT:     Discharge Plan: home with spouse/daughter A as needed. HHOT    Precautions: fall, wound vac     Current Status:  ADLs: CGA for g/h in standing at sink, min A for shorts during LE dressing, max A slipper socks, set- up A for UE dressing, min A for toilet transfer, mod A for pericares.     IADLs: spouse and daughter A at baseline but pt would like to be IND with simple meal prep and house keeping.     Vision/Cognition: appears intact. Will continue to assess functional cog.     Assessment:  Th provided min A to don shorts seated on toilet.  Once in bed, pt able to dress LB while inclined.  Th had pt use reacher for pants if sitting EOB or chair where she had to lift LE's but could then avoid bending/reaching - difficult due to weakness but no c/o abd or wound vac area pain today. Pt stood at sink for oral cares and used 2WW for amb between bed and bathroom and toilet/bed transfers.  SBA toilet and bed transfers.  Pt able to do her own pericares.  Th assisted with transport of wound vac.  Pt participating well in all ADLs and is demo increase activity tolerance.   Calesthenics seated EOB sitting unsupported, pt tolerated well.  She declined standing ex as she needed to save LE strength for PT session.  Plan to trial kitchen mobility in upcoming OT tx.  Pt reports she and H cook their own meals, also she has recently relied on MOWs.    Other Barriers to Discharge (DME, Family Training, etc): pt has several stairs at home (see PT notes). Will need shower chair vs tub bench and pt requesting grab bar by toilet vs RTS.

## 2021-05-06 NOTE — PLAN OF CARE
Pt alert and oriented x4. Able to make needs known. Denies pain, cp or SOB. Daughter at bedside till bedtime. IV abx infused without difficulties. Sacral mepilex CDI. Wound vac intact running at -125mmHg, no alarms noted or reported. A1 w/ walker and GB. LBM 5/5. Call light in reach, continue w/ POC.      Patient's most recent vital signs are:     Vital signs:  BP: 109/89  Temp: 97  HR: 98  RR: 16  SpO2: 95 %     Patient does not have new respiratory symptoms.  Patient does not have new sore throat.  Patient does not have a fever greater than 99.5.

## 2021-05-07 ENCOUNTER — APPOINTMENT (OUTPATIENT)
Dept: OCCUPATIONAL THERAPY | Facility: SKILLED NURSING FACILITY | Age: 71
End: 2021-05-07
Payer: COMMERCIAL

## 2021-05-07 PROCEDURE — 250N000013 HC RX MED GY IP 250 OP 250 PS 637: Performed by: INTERNAL MEDICINE

## 2021-05-07 PROCEDURE — 250N000012 HC RX MED GY IP 250 OP 636 PS 637: Performed by: INTERNAL MEDICINE

## 2021-05-07 PROCEDURE — 258N000003 HC RX IP 258 OP 636

## 2021-05-07 PROCEDURE — 97605 NEG PRS WND THER DME<=50SQCM: CPT

## 2021-05-07 PROCEDURE — 120N000009 HC R&B SNF

## 2021-05-07 PROCEDURE — 250N000011 HC RX IP 250 OP 636: Performed by: INTERNAL MEDICINE

## 2021-05-07 PROCEDURE — 97535 SELF CARE MNGMENT TRAINING: CPT | Mod: GO

## 2021-05-07 RX ORDER — SODIUM CHLORIDE 9 MG/ML
INJECTION, SOLUTION INTRAVENOUS
Status: COMPLETED
Start: 2021-05-07 | End: 2021-05-07

## 2021-05-07 RX ADMIN — PREDNISONE 5 MG: 5 TABLET ORAL at 08:31

## 2021-05-07 RX ADMIN — PANTOPRAZOLE SODIUM 40 MG: 40 TABLET, DELAYED RELEASE ORAL at 06:19

## 2021-05-07 RX ADMIN — TICAGRELOR 90 MG: 90 TABLET ORAL at 20:36

## 2021-05-07 RX ADMIN — CALCIUM CARBONATE 600 MG (1,500 MG)-VITAMIN D3 400 UNIT TABLET 1 TABLET: at 20:36

## 2021-05-07 RX ADMIN — ISOSORBIDE MONONITRATE 60 MG: 60 TABLET, EXTENDED RELEASE ORAL at 08:31

## 2021-05-07 RX ADMIN — LIDOCAINE 1 PATCH: 560 PATCH PERCUTANEOUS; TOPICAL; TRANSDERMAL at 19:54

## 2021-05-07 RX ADMIN — CEFAZOLIN SODIUM 2 G: 2 INJECTION, SOLUTION INTRAVENOUS at 08:31

## 2021-05-07 RX ADMIN — LISINOPRIL 5 MG: 5 TABLET ORAL at 08:31

## 2021-05-07 RX ADMIN — SODIUM CHLORIDE 500 ML: 9 INJECTION, SOLUTION INTRAVENOUS at 08:32

## 2021-05-07 RX ADMIN — MYCOPHENOLIC ACID 360 MG: 360 TABLET, DELAYED RELEASE ORAL at 20:36

## 2021-05-07 RX ADMIN — SODIUM CHLORIDE, PRESERVATIVE FREE 5 ML: 5 INJECTION INTRAVENOUS at 09:34

## 2021-05-07 RX ADMIN — TICAGRELOR 90 MG: 90 TABLET ORAL at 08:31

## 2021-05-07 RX ADMIN — ASPIRIN 81 MG CHEWABLE TABLET 81 MG: 81 TABLET CHEWABLE at 08:31

## 2021-05-07 RX ADMIN — FLUTICASONE PROPIONATE 1 SPRAY: 50 SPRAY, METERED NASAL at 08:53

## 2021-05-07 RX ADMIN — OXYCODONE HYDROCHLORIDE 5 MG: 5 TABLET ORAL at 10:03

## 2021-05-07 RX ADMIN — MYCOPHENOLIC ACID 360 MG: 360 TABLET, DELAYED RELEASE ORAL at 08:31

## 2021-05-07 RX ADMIN — CEFAZOLIN SODIUM 2 G: 2 INJECTION, SOLUTION INTRAVENOUS at 19:54

## 2021-05-07 RX ADMIN — HEPARIN, PORCINE (PF) 10 UNIT/ML INTRAVENOUS SYRINGE 10 ML: at 20:37

## 2021-05-07 RX ADMIN — CALCIUM CARBONATE 600 MG (1,500 MG)-VITAMIN D3 400 UNIT TABLET 1 TABLET: at 08:31

## 2021-05-07 RX ADMIN — ATORVASTATIN CALCIUM 80 MG: 40 TABLET, FILM COATED ORAL at 08:31

## 2021-05-07 NOTE — PLAN OF CARE
Alert and oriented x4. Denied any pain, Lidocaine patch to left forearm. Patient reports she slept off and on in between cares.  Bilateral groin wound-vac with no issues overnight, continuous suction at 125 mmHg. SBA with a walker for ambulation to the bathroom, continent of bowel and bladder. Continue POC.         Patient's most recent vital signs are:     Vital signs:  BP: 103/63  Temp: 96.2  HR: 96  RR: 16  SpO2: 93 %     Patient does not have new respiratory symptoms.  Patient does not have new sore throat.  Patient does not have a fever greater than 99.5.

## 2021-05-07 NOTE — PLAN OF CARE
Pt alert and oriented x4. Able to make needs known. Denies cp or SOB. Pain managed w/ PRN oxycodone. IV abx infused without difficulties. PICC heparin locked. Sacral mepilex CDI. Wound vac intact running at -125mmHg, no alarms noted or reported. WOC RN changed wound vac dressings and canister as well. A1 w/ walker and GB. Call light in reach, continue w/ POC.        Patient's most recent vital signs are:     Vital signs:  BP: 129/71  Temp: 96.3  HR: 91  RR: 16  SpO2: 96 %     Patient does not have new respiratory symptoms.  Patient does not have new sore throat.  Patient does not have a fever greater than 99.5.

## 2021-05-07 NOTE — PLAN OF CARE
Pt alert and oriented x4. Able to make needs known. Denies cp or SOB. Pain managed w/ lidocaine patch. Daughter at bedside till bedtime. IV abx infused without difficulties. PICC heparin locked. CHG bath completed. Sacral mepilex changed. Wound vac intact running at -125mmHg, no alarms noted or reported. A1 w/ walker and GB. LBM 5/6. Call light in reach, continue w/ POC.        Patient's most recent vital signs are:     Vital signs:  BP: 103/63  Temp: 96.2  HR: 96  RR: 16  SpO2: 93 %     Patient does not have new respiratory symptoms.  Patient does not have new sore throat.  Patient does not have a fever greater than 99.5.

## 2021-05-07 NOTE — PLAN OF CARE
Discharge Planner Post-Acute Rehab OT:      Discharge Plan: home with spouse/daughter A as needed. HHOT     Precautions: fall, wound vac      Current Status:  ADLs: CGA for g/h in standing at sink, min A for shorts during LE dressing, max A slipper socks, set- up A for UE dressing, min A for toilet transfer, mod A for pericares.      IADLs: spouse and daughter A at baseline but pt would like to be IND with simple meal prep and house keeping.      Vision/Cognition: appears intact. Will continue to assess functional cog.      Assessment:  pt amb to/from OT kitchen w/fww and assist w/vac and IV pole, w/c follow for PRN use. Tx focus light kitchen task and education re: kitchen mobility safety and energy conservation. Pt with good self awareness and demo'ing safety during tasks. Does required periodic seated rest breaks, pt states she has stool at home she will use for rest breaks and for EC during tasks. Cont per POC.       Other Barriers to Discharge (DME, Family Training, etc): pt has several stairs at home (see PT notes). Will need shower chair vs tub bench and pt requesting grab bar by toilet vs RTS.

## 2021-05-07 NOTE — PROGRESS NOTES
"[facility] Nurse Inpatient Wound Assessment   Reason for consultation: Evaluate and treat Bilateral groin and buttocks wounds    Assessment  Bilateral groin wounds due to Surgical Wound  Status: healing    5/4: Only assessed buttocks  Buttocks no pressure injury at this time    Treatment Plan  Bilateral groin wound: Negative Pressure Wound Therapy (NPWT) to be changed by WOC RN every Mon/Wed/Fri with small  black foam. Staff RN to assess pump settings set to -125 and dressing integrity every shift. Remove foam dressing and replace with BID normal saline moist gauze dressing if:  -a dressing failure which cannot be repaired within 2 hours  -patient is discharging to home without a home pump  -patient is discharging to a facility outside the local area  -if a dressing is a \"Silver Foam\", remove before Radiation Therapy or MRI    Buttocks prevention plan:  Cleanse the area with NS and pat dry.  Apply No sting film barrier to periwound skin.  Cover wound with Mepilex. Sacral Mepilex (#[id number])  Change dressing Q 3 days.  Turn and reposition Q 2hrs.  Ensure pt has Luis-cushion while sitting up in the chair.  FYI- If pt has constant incontinent loose stools needing dressing changes Q shift please discontinue the Mepilex dressing and apply criticaid barrier paste BID and PRN.      Orders Written  Recommended provider order: None, at this time  [facility] Nurse follow-up plan:Monday/WednesdayFriday  Nursing to notify the Provider(s) and re-consult the [facility] Nurse if wound(s) deteriorates or new skin concern.    Patient History  According to provider note(s):    69 yo F s/p EVAR AUI and left to right femoral to femoral bypass graft for 5.9 cm AAA. Initial post-op course was complicated by inferior STEMI requiring emergent cardiac catheterization and PCI to RCA. Patient presenting on 4/23/2021 to ED with subjective fevers and chills and erythema and drainage from right groin wound.      4/24/2021 - Incision and drainage of right groin wound; " packing of the wound with Adaptic, moistened Kerlix gauze, ABD pad     4/25/2021 - Explantation of femoral to femoral bypass graft; femoral to femoral bypass graft using cadaveric homograft (tunneled retropubic); bilateral sartorius muscle flaps; placement of negative pressure wound vac therapy.     4/26/2021 - Cardiac catheterization for post-op EKG changes; RCA stent patent; diffuse vasospasm of LAD, LCx.  Returned to OR for bilateral groin washout and placement of Veraflo wound vac system.    Objective Data  Containment of urine/stool: Continent of bladder and Continent of bowel    Active Diet Order  Orders Placed This Encounter      Regular Diet Adult      Output:   I/O last 3 completed shifts:  In: -   Out: 400 [Drains:400]    Risk Assessment:   Sensory Perception: 4-->no impairment  Moisture: 4-->rarely moist  Activity: 3-->walks occasionally  Mobility: 3-->slightly limited  Nutrition: 2-->probably inadequate  Friction and Shear: 2-->potential problem  Juan Luis Score: 18                          Labs:   Recent Labs   Lab 05/05/21  0715   ALBUMIN 1.6*   HGB 8.4*   WBC 11.3*   CRP 21.0*       Physical Exam  Areas of skin assessed: focused buttocks and bilateral groin    Wound Location:  Bilateral groin    Right groin    Left groin    Date of last photo 5/3  Wound History: See above, surgical wounds per vascular surgery.      Vac placed using window paning around wound to protect skin.     Right groin: 8 x 6 x 1.8 cm  Tunneling up to 2.5 cm at 11 o'clock, 3.5 cm at 3 o'clock and 1.5 at 5 o'clock  90% clean minimally granulating base, 10% scattered slough  Left groin 1.5 x 6 x 1 cm  Tunneling up to 3 cm at 11 o'clock and 2.5 at 3 o'clock  Clean minimally granulating base    Buttocks: Superficial skin tear and healed blanchable erythema to buttocks.  Not a pressure injury at this time.           Interventions  Visual inspection and assessment completed   Wound Care Rationale Promote moist wound healing without tissue  dehydration , Gently fill dead space to prevent abscess formation  and Provide protection   Wound Care: done per plan of care  Supplies: floor stock, discussed with RN and discussed with patient  Current off-loading measures: Pillows  Current support surface: Standard  Atmos Air mattress  Education provided to: importance of repositioning, plan of care, wound progress and Infection prevention   Discussed plan of care with Patient and Nurse    Janice Clarke RN CWOCN

## 2021-05-08 ENCOUNTER — APPOINTMENT (OUTPATIENT)
Dept: PHYSICAL THERAPY | Facility: SKILLED NURSING FACILITY | Age: 71
End: 2021-05-08
Payer: COMMERCIAL

## 2021-05-08 ENCOUNTER — APPOINTMENT (OUTPATIENT)
Dept: OCCUPATIONAL THERAPY | Facility: SKILLED NURSING FACILITY | Age: 71
End: 2021-05-08
Payer: COMMERCIAL

## 2021-05-08 PROCEDURE — 258N000003 HC RX IP 258 OP 636

## 2021-05-08 PROCEDURE — 97110 THERAPEUTIC EXERCISES: CPT | Mod: GP

## 2021-05-08 PROCEDURE — 97110 THERAPEUTIC EXERCISES: CPT | Mod: GO

## 2021-05-08 PROCEDURE — 120N000009 HC R&B SNF

## 2021-05-08 PROCEDURE — 99307 SBSQ NF CARE SF MDM 10: CPT | Performed by: INTERNAL MEDICINE

## 2021-05-08 PROCEDURE — 97116 GAIT TRAINING THERAPY: CPT | Mod: GP

## 2021-05-08 PROCEDURE — 250N000012 HC RX MED GY IP 250 OP 636 PS 637: Performed by: INTERNAL MEDICINE

## 2021-05-08 PROCEDURE — 250N000011 HC RX IP 250 OP 636: Performed by: INTERNAL MEDICINE

## 2021-05-08 PROCEDURE — 250N000013 HC RX MED GY IP 250 OP 250 PS 637: Performed by: INTERNAL MEDICINE

## 2021-05-08 PROCEDURE — 97530 THERAPEUTIC ACTIVITIES: CPT | Mod: GP

## 2021-05-08 RX ORDER — SODIUM CHLORIDE 9 MG/ML
INJECTION, SOLUTION INTRAVENOUS
Status: COMPLETED
Start: 2021-05-08 | End: 2021-05-08

## 2021-05-08 RX ADMIN — ISOSORBIDE MONONITRATE 60 MG: 60 TABLET, EXTENDED RELEASE ORAL at 09:14

## 2021-05-08 RX ADMIN — TICAGRELOR 90 MG: 90 TABLET ORAL at 20:30

## 2021-05-08 RX ADMIN — SODIUM CHLORIDE 500 ML: 9 INJECTION, SOLUTION INTRAVENOUS at 09:19

## 2021-05-08 RX ADMIN — ACETAMINOPHEN 650 MG: 325 TABLET, FILM COATED ORAL at 21:44

## 2021-05-08 RX ADMIN — HEPARIN, PORCINE (PF) 10 UNIT/ML INTRAVENOUS SYRINGE 10 ML: at 20:31

## 2021-05-08 RX ADMIN — ATORVASTATIN CALCIUM 80 MG: 40 TABLET, FILM COATED ORAL at 09:14

## 2021-05-08 RX ADMIN — MYCOPHENOLIC ACID 360 MG: 360 TABLET, DELAYED RELEASE ORAL at 20:31

## 2021-05-08 RX ADMIN — CEFAZOLIN SODIUM 2 G: 2 INJECTION, SOLUTION INTRAVENOUS at 20:40

## 2021-05-08 RX ADMIN — CALCIUM CARBONATE 600 MG (1,500 MG)-VITAMIN D3 400 UNIT TABLET 1 TABLET: at 20:30

## 2021-05-08 RX ADMIN — ASPIRIN 81 MG CHEWABLE TABLET 81 MG: 81 TABLET CHEWABLE at 09:14

## 2021-05-08 RX ADMIN — LISINOPRIL 5 MG: 5 TABLET ORAL at 09:14

## 2021-05-08 RX ADMIN — MYCOPHENOLIC ACID 360 MG: 360 TABLET, DELAYED RELEASE ORAL at 09:14

## 2021-05-08 RX ADMIN — FLUTICASONE PROPIONATE 1 SPRAY: 50 SPRAY, METERED NASAL at 09:14

## 2021-05-08 RX ADMIN — LIDOCAINE 1 PATCH: 560 PATCH PERCUTANEOUS; TOPICAL; TRANSDERMAL at 20:32

## 2021-05-08 RX ADMIN — CALCIUM CARBONATE 600 MG (1,500 MG)-VITAMIN D3 400 UNIT TABLET 1 TABLET: at 09:14

## 2021-05-08 RX ADMIN — PREDNISONE 5 MG: 5 TABLET ORAL at 09:14

## 2021-05-08 RX ADMIN — CEFAZOLIN SODIUM 2 G: 2 INJECTION, SOLUTION INTRAVENOUS at 09:19

## 2021-05-08 RX ADMIN — TICAGRELOR 90 MG: 90 TABLET ORAL at 09:14

## 2021-05-08 RX ADMIN — SODIUM CHLORIDE, PRESERVATIVE FREE 5 ML: 5 INJECTION INTRAVENOUS at 10:04

## 2021-05-08 RX ADMIN — PANTOPRAZOLE SODIUM 40 MG: 40 TABLET, DELAYED RELEASE ORAL at 06:58

## 2021-05-08 NOTE — PLAN OF CARE
Pt alert and oriented x4. Able to make needs known. Denies pain, cp or SOB. IV abx infused without difficulties. PICC heparin locked. Sacral mepilex CDI. Wound vac intact running at -125mmHg, no alarms noted or reported. A1 w/ walker and GB. Call light in reach, continue w/ POC.      Patient's most recent vital signs are:     Vital signs:  BP: 137/82  Temp: 96.6  HR: 103  RR: 16  SpO2: 99 %     Patient does not have new respiratory symptoms.  Patient does not have new sore throat.  Patient does not have a fever greater than 99.5.

## 2021-05-08 NOTE — PROGRESS NOTES
Pt was seen, course reviewed with team  Pt was seen by Vascular Surgery on 5/5/21. R groin wound felt to be stable    Pt reports feeling well, denies cough, chest pain, SOB or dizziness  Appetite good    Afebrile  BP 120s-150s/  HR 90s  02 sats upper 90s RA    Alert, fully oriented  Lungs clear  CV rrr  Abd soft  R groin wound vac in place  No LE edema      Assessment/plan       70 year old year old female with hx of  CKD s/p LDKT 2004, recent inferior STEMI s/p RCA stent (4/7/21), and SCC of right lung in remission since 2014, with prior fem-fem bypass s/p EVAR 4/6/21 is being admitted to  TCU on 05/02/2021 after hospitalization at [facility] from 04/23/2021 to 05/02/2021. She was admitted with sepsis 2/2 right femoral groin access site and perigraft abscess. She underwent  groin washout, sartorius muscle flap and redo fem fem bypass with cadaveric artery with 1L EBL on 04/25. Hospital course complicated by  inferior ST elevations, and taken to cath lab, found to have diffuse coronary vasospasm with complete occlusion of RCA that reopened with nitroglycerin.       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 Persistently elevated white count, though trending down as of 5/5/21 (may be related to prednisone)  Wound vac in place.  Vascular surgery is following  Plan - Continue cefazolin 2g IV q12h for MSSA treatment. : 4/27-6/8  WOC and vascular surgery f/u   Monitor CBC w/ diff and CMP weekly. If Creatinine clearance  increases > 50 ml/min, increase Cefazolin dose to 2gm IV q8h  Follow up with  Dr. Flores on 6/3 to establish final duration of antibiotics.        # Coronary vasospasm  # Hx of Inferior STEMI s/p LIUDMILA to RCA  # HFrEF with an EF 35-40%  Post operatively was found to have inferior ST elevations and taken to cath lab, found to have diffuse coronary vasospasm with complete occlusion of RCA that reopened with NTG. TTE 4/26 showed EF 35-40% with unchanged inferior WMAs, troponin peaked at 29.5  - Continue lisinopril 5 mg daily, increase as tolerated  - Avoid BB given recent Vasospasm event until she follows up as an outpatient  - Avoid non DHP CCBs (Verapamil, Diltiazem) given HFrEF. Can start Amlodipine 5mg daily in the future if necessary to prevent further vasospasm   - Imdur 60mg daily  - Continue PO ASA 81mg, continue PO Brilinta 90mg BID  - Hematology has recommended lifelong DOAC for recurrent DVT. When safe from a surgical standpoint can discontinue aspirin and restart eliquis and continue ticagrelor 90mg BID.   -Continue PO Lipitor 80mg qday   - Aldosterone blockade for CAD/STEMI ischemic cardiomyopathy    # s/p LDKT 9/20/2004: Stable kidney function, now below baseline, likely due to loss of muscle mass recently  On MMF, Prednisone  - Continue  - CMP weekly  - Avoid Nephrotoxins  - Renal dose medications.         # Hypertension: Borderline control.Goal BP: < 130/80  - Monitor      # Anemia: Likely d/t blood loss, and Chronic Renal Disease  Hg 8.1 gm/dl on 4/30  - Continue to monitor weekly     # Cephalic vein thrombosis  # Superficial hematoma in the left distal forearm  Plan for conservative management with  dual antiplatelet therapy

## 2021-05-08 NOTE — PLAN OF CARE
Discharge Planner Post-Acute Rehab OT:      Discharge Plan: home with spouse/daughter A as needed. HHOT     Precautions: fall, wound vac      Current Status:  ADLs: set up ub drg/grooming, basic transfers sba, LB drg min A last recorded OT session but pt stated today (5/8) she dressed self completely w / set up Prior to OT session      IADLs: spouse and daughter A at baseline but pt would like to be IND with simple meal prep and house keeping.      Vision/Cognition: appears intact. Will continue to assess functional cog.      Assessment:  Pt improving w/ adls and mobility, focus on IADLs and activity saeid and re assess LB drg for level of indep,  Cont per POC.       Other Barriers to Discharge (DME, Family Training, etc): pt has several stairs at home (see PT notes). Will need shower chair vs tub bench and pt requesting grab bar by toilet vs RTS.

## 2021-05-08 NOTE — PLAN OF CARE
"Alert and oriented x4. Patient noted new concerns overnight. Notes she slept well in between cares, \"I think by the time I go home I'll be sleeping all night\". SBA with ambulation to the bathroom, reported no concerns with B/B-continent. Wound vac with no issues overnight. Continue POC.       Patient's most recent vital signs are:     Vital signs:  BP: 129/71  Temp: 96.3  HR: 91  RR: 16  SpO2: 96 %     Patient does not have new respiratory symptoms.  Patient does not have new sore throat.  Patient does not have a fever greater than 99.5.         "

## 2021-05-08 NOTE — PLAN OF CARE
VSS. Alert and orientedx4. Pleasant and cooperative. Able to verbalized needs and used call light appropriately. Denies chest pain, N/V, dizziness, headache, abdominal discomfort. A-1 for transfer and toileting. Ambulated to/from the bathroom. Continent of B/B. BM today. Wound vac to bilateral groin CDI, seems that cannister was changed this am since scant amount seen at the cannister at the beginning of the shift (total output this shift approx 100mls of serosanguinous drainage)  Gold wipes at bedtime.  PICC to R arm patent, continued on IV ancef. Lidocaine patchx1 to LFA.   Sacral mepilex CDI.  Will continue plan of care

## 2021-05-08 NOTE — PLAN OF CARE
Discharge Planner Post-Acute Rehab PT:     Discharge Plan: home with assist from spouse and HH PT     Precautions: falls, woc vac (sites located on ant pelvis 2x near ASISs)    Current Status:  Bed Mobility: SBA  Transfer:  STS FWW SBA  Gait: FWW, 160', SBA, w/c follow  Stairs: up/down 6 stairs x2 reps with B rails SBA, assist with wound vac, seated rest after each set  Balance: SBA     Assessment:  Pt progressing with overall activity tolerance. Tolerated amb room<>PT gym using ww SBA, 6 stairs x2 sets with rest after each set. Seated theraband ex.    **Recommend no nustep use at this time d/t wound vac sites.    Other Barriers to Discharge (DME, Family Training, etc): family training

## 2021-05-09 PROCEDURE — 250N000013 HC RX MED GY IP 250 OP 250 PS 637: Performed by: INTERNAL MEDICINE

## 2021-05-09 PROCEDURE — 250N000011 HC RX IP 250 OP 636: Performed by: INTERNAL MEDICINE

## 2021-05-09 PROCEDURE — 250N000012 HC RX MED GY IP 250 OP 636 PS 637: Performed by: INTERNAL MEDICINE

## 2021-05-09 PROCEDURE — 022N000001 HC SNF RUG CODE OPNP

## 2021-05-09 PROCEDURE — 120N000009 HC R&B SNF

## 2021-05-09 RX ADMIN — ASPIRIN 81 MG CHEWABLE TABLET 81 MG: 81 TABLET CHEWABLE at 08:47

## 2021-05-09 RX ADMIN — PANTOPRAZOLE SODIUM 40 MG: 40 TABLET, DELAYED RELEASE ORAL at 06:43

## 2021-05-09 RX ADMIN — LIDOCAINE 1 PATCH: 560 PATCH PERCUTANEOUS; TOPICAL; TRANSDERMAL at 20:39

## 2021-05-09 RX ADMIN — PREDNISONE 5 MG: 5 TABLET ORAL at 08:45

## 2021-05-09 RX ADMIN — FLUTICASONE PROPIONATE 1 SPRAY: 50 SPRAY, METERED NASAL at 08:44

## 2021-05-09 RX ADMIN — ATORVASTATIN CALCIUM 80 MG: 40 TABLET, FILM COATED ORAL at 08:45

## 2021-05-09 RX ADMIN — LISINOPRIL 5 MG: 5 TABLET ORAL at 08:52

## 2021-05-09 RX ADMIN — TICAGRELOR 90 MG: 90 TABLET ORAL at 08:45

## 2021-05-09 RX ADMIN — MYCOPHENOLIC ACID 360 MG: 360 TABLET, DELAYED RELEASE ORAL at 20:40

## 2021-05-09 RX ADMIN — HEPARIN, PORCINE (PF) 10 UNIT/ML INTRAVENOUS SYRINGE 10 ML: at 20:40

## 2021-05-09 RX ADMIN — CALCIUM CARBONATE 600 MG (1,500 MG)-VITAMIN D3 400 UNIT TABLET 1 TABLET: at 20:40

## 2021-05-09 RX ADMIN — CEFAZOLIN SODIUM 2 G: 2 INJECTION, SOLUTION INTRAVENOUS at 08:48

## 2021-05-09 RX ADMIN — MYCOPHENOLIC ACID 360 MG: 360 TABLET, DELAYED RELEASE ORAL at 08:45

## 2021-05-09 RX ADMIN — TICAGRELOR 90 MG: 90 TABLET ORAL at 20:40

## 2021-05-09 RX ADMIN — CEFAZOLIN SODIUM 2 G: 2 INJECTION, SOLUTION INTRAVENOUS at 20:36

## 2021-05-09 RX ADMIN — OXYCODONE HYDROCHLORIDE 5 MG: 5 TABLET ORAL at 00:47

## 2021-05-09 RX ADMIN — CALCIUM CARBONATE 600 MG (1,500 MG)-VITAMIN D3 400 UNIT TABLET 1 TABLET: at 08:45

## 2021-05-09 RX ADMIN — ISOSORBIDE MONONITRATE 60 MG: 60 TABLET, EXTENDED RELEASE ORAL at 08:45

## 2021-05-09 NOTE — PLAN OF CARE
Alert and oriented x4. Asleep intermittently but mostly awake. Medicated with prn Oxycodone x1 at  0045 AM for bilateral groin/wound sites pain. Requested but subsequently declined prn Tylenol this morning at 0500. Patient noted no other concerns. Wound-vac/dressings with no noted issues, no alarms. Reported no issues with B/B, SBA with walker to bathroom. Continue POC.       Patient's most recent vital signs are:     Vital signs:  BP: 121/66  Temp: 97  HR: 86  RR: 16  SpO2: 97 %     Patient does not have new respiratory symptoms.  Patient does not have new sore throat.  Patient does not have a fever greater than 99.5.

## 2021-05-09 NOTE — PLAN OF CARE
Pt is alert and oriented x 4. Uses call light appropriately and able to make needs known. Managed left groin pain with tylenol x 1. Had a good appetite. Wound vac dressing intact, on continuous suctioning at 125 mmHg. Sacral mepilex CDI. PICC patent, blood return noted and heparin locked. IV abx infused without difficulty. Had a medium BM on this shift. Transfers with 1 assist using walker and gait belt. call light is in reach. Continue with plan of care.     Patient's most recent vital signs are:     Vital signs:  BP: 121/66  Temp: 97  HR: 86  RR: 16  SpO2: 97 %     Patient does not have new respiratory symptoms.  Patient does not have new sore throat.  Patient does not have a fever greater than 99.5.

## 2021-05-10 ENCOUNTER — APPOINTMENT (OUTPATIENT)
Dept: PHYSICAL THERAPY | Facility: SKILLED NURSING FACILITY | Age: 71
End: 2021-05-10
Payer: COMMERCIAL

## 2021-05-10 ENCOUNTER — APPOINTMENT (OUTPATIENT)
Dept: OCCUPATIONAL THERAPY | Facility: SKILLED NURSING FACILITY | Age: 71
End: 2021-05-10
Payer: COMMERCIAL

## 2021-05-10 LAB
ALBUMIN SERPL-MCNC: 2 G/DL (ref 3.4–5)
ALP SERPL-CCNC: 133 U/L (ref 40–150)
ALT SERPL W P-5'-P-CCNC: <6 U/L (ref 0–50)
ANION GAP SERPL CALCULATED.3IONS-SCNC: 8 MMOL/L (ref 3–14)
AST SERPL W P-5'-P-CCNC: 13 U/L (ref 0–45)
BASOPHILS # BLD AUTO: 0.1 10E9/L (ref 0–0.2)
BASOPHILS NFR BLD AUTO: 0.7 %
BILIRUB SERPL-MCNC: 0.3 MG/DL (ref 0.2–1.3)
BUN SERPL-MCNC: 23 MG/DL (ref 7–30)
CALCIUM SERPL-MCNC: 8.8 MG/DL (ref 8.5–10.1)
CHLORIDE SERPL-SCNC: 107 MMOL/L (ref 94–109)
CO2 SERPL-SCNC: 24 MMOL/L (ref 20–32)
CREAT SERPL-MCNC: 1.2 MG/DL (ref 0.52–1.04)
CRP SERPL-MCNC: 23 MG/L (ref 0–8)
DIFFERENTIAL METHOD BLD: ABNORMAL
EOSINOPHIL # BLD AUTO: 0.1 10E9/L (ref 0–0.7)
EOSINOPHIL NFR BLD AUTO: 0.9 %
ERYTHROCYTE [DISTWIDTH] IN BLOOD BY AUTOMATED COUNT: 20.3 % (ref 10–15)
GFR SERPL CREATININE-BSD FRML MDRD: 46 ML/MIN/{1.73_M2}
GLUCOSE SERPL-MCNC: 84 MG/DL (ref 70–99)
HCT VFR BLD AUTO: 31.1 % (ref 35–47)
HGB BLD-MCNC: 9.6 G/DL (ref 11.7–15.7)
IMM GRANULOCYTES # BLD: 0 10E9/L (ref 0–0.4)
IMM GRANULOCYTES NFR BLD: 0.3 %
LYMPHOCYTES # BLD AUTO: 0.5 10E9/L (ref 0.8–5.3)
LYMPHOCYTES NFR BLD AUTO: 5.5 %
MCH RBC QN AUTO: 29.2 PG (ref 26.5–33)
MCHC RBC AUTO-ENTMCNC: 30.9 G/DL (ref 31.5–36.5)
MCV RBC AUTO: 95 FL (ref 78–100)
MONOCYTES # BLD AUTO: 0.8 10E9/L (ref 0–1.3)
MONOCYTES NFR BLD AUTO: 9.2 %
NEUTROPHILS # BLD AUTO: 7.4 10E9/L (ref 1.6–8.3)
NEUTROPHILS NFR BLD AUTO: 83.4 %
NRBC # BLD AUTO: 0 10*3/UL
NRBC BLD AUTO-RTO: 0 /100
PLATELET # BLD AUTO: 375 10E9/L (ref 150–450)
POTASSIUM SERPL-SCNC: 3.2 MMOL/L (ref 3.4–5.3)
PROT SERPL-MCNC: 6 G/DL (ref 6.8–8.8)
RBC # BLD AUTO: 3.29 10E12/L (ref 3.8–5.2)
SODIUM SERPL-SCNC: 139 MMOL/L (ref 133–144)
WBC # BLD AUTO: 8.9 10E9/L (ref 4–11)

## 2021-05-10 PROCEDURE — 86140 C-REACTIVE PROTEIN: CPT | Performed by: INTERNAL MEDICINE

## 2021-05-10 PROCEDURE — 250N000013 HC RX MED GY IP 250 OP 250 PS 637: Performed by: INTERNAL MEDICINE

## 2021-05-10 PROCEDURE — 258N000003 HC RX IP 258 OP 636

## 2021-05-10 PROCEDURE — 97110 THERAPEUTIC EXERCISES: CPT | Mod: GP

## 2021-05-10 PROCEDURE — 250N000012 HC RX MED GY IP 250 OP 636 PS 637: Performed by: INTERNAL MEDICINE

## 2021-05-10 PROCEDURE — 97110 THERAPEUTIC EXERCISES: CPT | Mod: GO

## 2021-05-10 PROCEDURE — 36592 COLLECT BLOOD FROM PICC: CPT | Performed by: INTERNAL MEDICINE

## 2021-05-10 PROCEDURE — 250N000011 HC RX IP 250 OP 636: Performed by: INTERNAL MEDICINE

## 2021-05-10 PROCEDURE — 85025 COMPLETE CBC W/AUTO DIFF WBC: CPT | Performed by: INTERNAL MEDICINE

## 2021-05-10 PROCEDURE — 97605 NEG PRS WND THER DME<=50SQCM: CPT

## 2021-05-10 PROCEDURE — 120N000009 HC R&B SNF

## 2021-05-10 PROCEDURE — 80053 COMPREHEN METABOLIC PANEL: CPT | Performed by: INTERNAL MEDICINE

## 2021-05-10 RX ORDER — POTASSIUM CHLORIDE 750 MG/1
20 TABLET, EXTENDED RELEASE ORAL DAILY
Status: DISCONTINUED | OUTPATIENT
Start: 2021-05-10 | End: 2021-05-10

## 2021-05-10 RX ORDER — SODIUM CHLORIDE 9 MG/ML
INJECTION, SOLUTION INTRAVENOUS
Status: COMPLETED
Start: 2021-05-10 | End: 2021-05-10

## 2021-05-10 RX ORDER — POTASSIUM CHLORIDE 750 MG/1
20 TABLET, EXTENDED RELEASE ORAL DAILY
Status: DISCONTINUED | OUTPATIENT
Start: 2021-05-11 | End: 2021-05-23

## 2021-05-10 RX ADMIN — CALCIUM CARBONATE 600 MG (1,500 MG)-VITAMIN D3 400 UNIT TABLET 1 TABLET: at 20:18

## 2021-05-10 RX ADMIN — ISOSORBIDE MONONITRATE 60 MG: 60 TABLET, EXTENDED RELEASE ORAL at 08:24

## 2021-05-10 RX ADMIN — CEFAZOLIN SODIUM 2 G: 2 INJECTION, SOLUTION INTRAVENOUS at 08:23

## 2021-05-10 RX ADMIN — ASPIRIN 81 MG CHEWABLE TABLET 81 MG: 81 TABLET CHEWABLE at 08:23

## 2021-05-10 RX ADMIN — CALCIUM CARBONATE 600 MG (1,500 MG)-VITAMIN D3 400 UNIT TABLET 1 TABLET: at 08:25

## 2021-05-10 RX ADMIN — TICAGRELOR 90 MG: 90 TABLET ORAL at 20:18

## 2021-05-10 RX ADMIN — SODIUM CHLORIDE, PRESERVATIVE FREE 5 ML: 5 INJECTION INTRAVENOUS at 09:38

## 2021-05-10 RX ADMIN — PREDNISONE 5 MG: 5 TABLET ORAL at 08:24

## 2021-05-10 RX ADMIN — LISINOPRIL 5 MG: 5 TABLET ORAL at 08:24

## 2021-05-10 RX ADMIN — LIDOCAINE 1 PATCH: 560 PATCH PERCUTANEOUS; TOPICAL; TRANSDERMAL at 20:18

## 2021-05-10 RX ADMIN — ATORVASTATIN CALCIUM 80 MG: 40 TABLET, FILM COATED ORAL at 08:23

## 2021-05-10 RX ADMIN — CEFAZOLIN SODIUM 2 G: 2 INJECTION, SOLUTION INTRAVENOUS at 20:18

## 2021-05-10 RX ADMIN — POTASSIUM CHLORIDE 20 MEQ: 750 TABLET, EXTENDED RELEASE ORAL at 09:38

## 2021-05-10 RX ADMIN — PANTOPRAZOLE SODIUM 40 MG: 40 TABLET, DELAYED RELEASE ORAL at 06:57

## 2021-05-10 RX ADMIN — OXYCODONE HYDROCHLORIDE 5 MG: 5 TABLET ORAL at 12:46

## 2021-05-10 RX ADMIN — TICAGRELOR 90 MG: 90 TABLET ORAL at 08:24

## 2021-05-10 RX ADMIN — MYCOPHENOLIC ACID 360 MG: 360 TABLET, DELAYED RELEASE ORAL at 08:25

## 2021-05-10 RX ADMIN — FLUTICASONE PROPIONATE 1 SPRAY: 50 SPRAY, METERED NASAL at 08:23

## 2021-05-10 RX ADMIN — HEPARIN, PORCINE (PF) 10 UNIT/ML INTRAVENOUS SYRINGE 10 ML: at 21:21

## 2021-05-10 RX ADMIN — SODIUM CHLORIDE, PRESERVATIVE FREE 5 ML: 5 INJECTION INTRAVENOUS at 07:39

## 2021-05-10 RX ADMIN — MYCOPHENOLIC ACID 360 MG: 360 TABLET, DELAYED RELEASE ORAL at 20:18

## 2021-05-10 RX ADMIN — SODIUM CHLORIDE 500 ML: 9 INJECTION, SOLUTION INTRAVENOUS at 20:18

## 2021-05-10 NOTE — PLAN OF CARE
Discharge Planner Post-Acute Rehab OT:      Discharge Plan: home with spouse/daughter A as needed. HHOT     Precautions: fall, wound vac      Current Status:  ADLs: set up ub drg/grooming, basic transfers sba, LB drg min A last recorded OT session but pt stated today (5/8) she dressed self completely w / set up Prior to OT session      IADLs: spouse and daughter A at baseline but pt would like to be IND with simple meal prep and house keeping.      Vision/Cognition: appears intact. Will continue to assess functional cog.      Assessment: Pt dressed Prior to OT session and reports dressing indep after set up, discussed pt may meet therapy goals prior to discharge due to need to TCU stay for IVs.  Pt improving w/ adls and mobility, focus on IADLs and activity saeid and re assess LB drg for level of indep,  Cont per POC.       Other Barriers to Discharge (DME, Family Training, etc): pt has several stairs at home (see PT notes). Will need shower chair vs tub bench and pt requesting grab bar by toilet vs RTS.

## 2021-05-10 NOTE — PLAN OF CARE
Discharge Planner Post-Acute Rehab PT:      Discharge Plan: home with assist from spouse and HH PT     Precautions: falls, woc vac (sites located on ant pelvis 2x near ASISs)     Current Status:  Bed Mobility: SBA  Transfer:  STS FWW SBA  Gait: FWW, 250', SBA  Stairs: up/down 6 stairs x2 reps with B rails SBA, assist with wound vac, seated rest after each set  Balance: SBA      Assessment:  Pt declining most interventions this date 2/2 fatigue during late PM session but does demonstrate longer distance ambulation bouts w/ FWW, ~250' x2.      **Recommend no nustep use at this time d/t wound vac sites.     Other Barriers to Discharge (DME, Family Training, etc): family training

## 2021-05-10 NOTE — PLAN OF CARE
Pt is alert and oriented x 4. Uses call light appropriately and able to make needs known. Had a good appetite. Denies pain, SOB, CP, headache, nausea or dizziness. PICC patent, blood return noted and heparin locked. IV abx infused without issues. Wound vac dressing to bilateral groin CDI. Sacral mepilex CDI. Gold wipes done at bedtime. Transfers with 1 assist to the bathroom using walker and gait belt. Call light is in reach. Continue with plan of care.       Patient's most recent vital signs are:     Vital signs:  BP: 104/80  Temp: 96.9  HR: 99  RR: 28  SpO2: 97 %     Patient does not have new respiratory symptoms.  Patient does not have new sore throat.  Patient does not have a fever greater than 99.5.

## 2021-05-10 NOTE — PROGRESS NOTES
"[facility] Nurse Inpatient Wound Assessment   Reason for consultation: Evaluate and treat Bilateral groin and buttocks wounds    Assessment  Bilateral groin wounds due to Surgical Wound  Status: healing    5/4: Only assessed buttocks  Buttocks no pressure injury at this time    Treatment Plan  Bilateral groin wound: Negative Pressure Wound Therapy (NPWT) to be changed by WOC RN every Mon/Wed/Fri with small  black foam. Staff RN to assess pump settings set to -125 and dressing integrity every shift. Remove foam dressing and replace with BID normal saline moist gauze dressing if:  -a dressing failure which cannot be repaired within 2 hours  -patient is discharging to home without a home pump  -patient is discharging to a facility outside the local area  -if a dressing is a \"Silver Foam\", remove before Radiation Therapy or MRI    Buttocks prevention plan:  Cleanse the area with NS and pat dry.  Apply No sting film barrier to periwound skin.  Cover wound with Mepilex. Sacral Mepilex (#958170)  Change dressing Q 3 days.  Turn and reposition Q 2hrs.  Ensure pt has Luis-cushion while sitting up in the chair.  FYI- If pt has constant incontinent loose stools needing dressing changes Q shift please discontinue the Mepilex dressing and apply criticaid barrier paste BID and PRN.      Orders Written  Recommended provider order: None, at this time  [facility] Nurse follow-up plan:Monday/WednesdayFriday  Nursing to notify the Provider(s) and re-consult the [facility] Nurse if wound(s) deteriorates or new skin concern.    Patient History  According to provider note(s):    69 yo F s/p EVAR AUI and left to right femoral to femoral bypass graft for 5.9 cm AAA. Initial post-op course was complicated by inferior STEMI requiring emergent cardiac catheterization and PCI to RCA. Patient presenting on 4/23/2021 to ED with subjective fevers and chills and erythema and drainage from right groin wound.      4/24/2021 - Incision and drainage of right groin wound; " packing of the wound with Adaptic, moistened Kerlix gauze, ABD pad     4/25/2021 - Explantation of femoral to femoral bypass graft; femoral to femoral bypass graft using cadaveric homograft (tunneled retropubic); bilateral sartorius muscle flaps; placement of negative pressure wound vac therapy.     4/26/2021 - Cardiac catheterization for post-op EKG changes; RCA stent patent; diffuse vasospasm of LAD, LCx.  Returned to OR for bilateral groin washout and placement of Veraflo wound vac system.    Objective Data  Containment of urine/stool: Continent of bladder and Continent of bowel    Active Diet Order  Orders Placed This Encounter      Regular Diet Adult      Output:   I/O last 3 completed shifts:  In: 720 [P.O.:720]  Out: -     Risk Assessment:   Sensory Perception: 4-->no impairment  Moisture: 4-->rarely moist  Activity: 3-->walks occasionally  Mobility: 3-->slightly limited  Nutrition: 2-->probably inadequate  Friction and Shear: 2-->potential problem  Juan Luis Score: 18                          Labs:   Recent Labs   Lab 05/10/21  0743   ALBUMIN 2.0*   HGB 9.6*   WBC 8.9   CRP 23.0*       Physical Exam  Areas of skin assessed: focused buttocks and bilateral groin    Wound Location:  Bilateral groin    Right groin    Left groin    Date of last photo 5/3 (unable to upload 5/10)  Wound History: See above, surgical wounds per vascular surgery.      Vac placed using window paning around wound to protect skin.     Right groin: 7.5 x 5.5 x 1.8 cm (at deepest area)  Tunneling up to 1.5 cm at 11 o'clock, 2 cm at 3 o'clock and 1cm at 5 o'clock  90% clean minimally granulating base, 10% scattered slough  Left groin 1.3 x 5.5 x 1 cm  Tunneling up to 1 cm at 11 o'clock and 1.5 at 3 o'clock  Clean minimally granulating base    Buttocks: Superficial skin tear and healed blanchable erythema to buttocks.  Not a pressure injury at this time.           Interventions  Visual inspection and assessment completed   Wound Care Rationale  Promote moist wound healing without tissue dehydration , Gently fill dead space to prevent abscess formation  and Provide protection   Wound Care: done per plan of care  Supplies: floor stock, discussed with RN and discussed with patient  Current off-loading measures: Pillows  Current support surface: Standard  Atmos Air mattress  Education provided to: importance of repositioning, plan of care, wound progress and Infection prevention   Discussed plan of care with Patient and Nurse    Janice Clarke RN CWOCN

## 2021-05-10 NOTE — PROGRESS NOTES
Patient was seen briefly, course reviewed with team as well as wound nurse.  Wounds are healing well with wound VAC.  Conceivably, patient could go home with a wound VAC particularly on the right.  Antibiotics are slated to continue through the first week of June.  Insurance coverage for home  IV antibiotics will need to be determined particularly if wounds are sufficiently healing prior to stop date of antibiotics.    Discussed with patient, who is anxious to determine plan for discharge

## 2021-05-10 NOTE — PLAN OF CARE
Alert and oriented x4. Denied any pain, Lidocaine patch to left forearm. No acute concerns. Bilateral groin wound-vac with no issues overnight, continuous suction at 125 mmHg. SBA with a walker for ambulation to the bathroom, continent of bowel and bladder. Calls appropriately for assistance. Continue POC.       Patient's most recent vital signs are:     Vital signs:  BP: 104/80  Temp: 96.9  HR: 99  RR: 28  SpO2: 97 %     Patient does not have new respiratory symptoms.  Patient does not have new sore throat.  Patient does not have a fever greater than 99.5.

## 2021-05-10 NOTE — PLAN OF CARE
VS: BP (!) 119/90 (BP Location: Right leg)   Pulse 101   Temp 97.4  F (36.3  C) (Axillary)   Resp 16   Wt 48.3 kg (106 lb 8 oz)   SpO2 97%   BMI 19.48 kg/m     O2: RA    Output: Uses bathroom    Last BM: 5/10    Activity: AO1/SBA, with walker and GB    Skin: Bruising on L forearm, abdomen   Sacral mepilex   Wound vac to groin    Pain: Denies   Oxycodone given x 1 prior to dressing change   CMS: Intact    Dressing: Sacral mepilex intact  PICC line dressing CDI    Diet: Regular    LDA: Double lumen PICC RA, heparin locked when not infusing, blood return noted   Equipment: Wound vac, IV pole    Plan: Continue plan of care.    Additional Info: PT is alert and oriented. No concerns or complaints this shift. IV ran via PICC without issue. Call light within reach.            Patient's most recent vital signs are:     Vital signs:  BP: 119/90  Temp: 97.4  HR: 101  RR: 16  SpO2: 97 %     Patient does not have new respiratory symptoms.  Patient does not have new sore throat.  Patient does not have a fever greater than 99.5.

## 2021-05-11 ENCOUNTER — APPOINTMENT (OUTPATIENT)
Dept: PHYSICAL THERAPY | Facility: SKILLED NURSING FACILITY | Age: 71
End: 2021-05-11
Payer: COMMERCIAL

## 2021-05-11 ENCOUNTER — APPOINTMENT (OUTPATIENT)
Dept: OCCUPATIONAL THERAPY | Facility: SKILLED NURSING FACILITY | Age: 71
End: 2021-05-11
Payer: COMMERCIAL

## 2021-05-11 PROCEDURE — 250N000011 HC RX IP 250 OP 636: Performed by: INTERNAL MEDICINE

## 2021-05-11 PROCEDURE — 120N000009 HC R&B SNF

## 2021-05-11 PROCEDURE — 97116 GAIT TRAINING THERAPY: CPT | Mod: GP

## 2021-05-11 PROCEDURE — 258N000003 HC RX IP 258 OP 636

## 2021-05-11 PROCEDURE — 97110 THERAPEUTIC EXERCISES: CPT | Mod: GP

## 2021-05-11 PROCEDURE — 250N000013 HC RX MED GY IP 250 OP 250 PS 637: Performed by: INTERNAL MEDICINE

## 2021-05-11 PROCEDURE — 250N000012 HC RX MED GY IP 250 OP 636 PS 637: Performed by: INTERNAL MEDICINE

## 2021-05-11 PROCEDURE — 97110 THERAPEUTIC EXERCISES: CPT | Mod: GO

## 2021-05-11 RX ORDER — SODIUM CHLORIDE 9 MG/ML
INJECTION, SOLUTION INTRAVENOUS
Status: COMPLETED
Start: 2021-05-11 | End: 2021-05-11

## 2021-05-11 RX ADMIN — TICAGRELOR 90 MG: 90 TABLET ORAL at 09:57

## 2021-05-11 RX ADMIN — TICAGRELOR 90 MG: 90 TABLET ORAL at 20:41

## 2021-05-11 RX ADMIN — PANTOPRAZOLE SODIUM 40 MG: 40 TABLET, DELAYED RELEASE ORAL at 05:36

## 2021-05-11 RX ADMIN — ACETAMINOPHEN 650 MG: 325 TABLET, FILM COATED ORAL at 05:36

## 2021-05-11 RX ADMIN — MYCOPHENOLIC ACID 360 MG: 360 TABLET, DELAYED RELEASE ORAL at 20:41

## 2021-05-11 RX ADMIN — MYCOPHENOLIC ACID 360 MG: 360 TABLET, DELAYED RELEASE ORAL at 09:57

## 2021-05-11 RX ADMIN — FLUTICASONE PROPIONATE 1 SPRAY: 50 SPRAY, METERED NASAL at 09:58

## 2021-05-11 RX ADMIN — ATORVASTATIN CALCIUM 80 MG: 40 TABLET, FILM COATED ORAL at 09:57

## 2021-05-11 RX ADMIN — CEFAZOLIN SODIUM 2 G: 2 INJECTION, SOLUTION INTRAVENOUS at 09:48

## 2021-05-11 RX ADMIN — PREDNISONE 5 MG: 5 TABLET ORAL at 09:56

## 2021-05-11 RX ADMIN — LISINOPRIL 5 MG: 5 TABLET ORAL at 09:57

## 2021-05-11 RX ADMIN — HEPARIN, PORCINE (PF) 10 UNIT/ML INTRAVENOUS SYRINGE 10 ML: at 20:40

## 2021-05-11 RX ADMIN — ASPIRIN 81 MG CHEWABLE TABLET 81 MG: 81 TABLET CHEWABLE at 09:56

## 2021-05-11 RX ADMIN — LIDOCAINE 1 PATCH: 560 PATCH PERCUTANEOUS; TOPICAL; TRANSDERMAL at 20:40

## 2021-05-11 RX ADMIN — CALCIUM CARBONATE 600 MG (1,500 MG)-VITAMIN D3 400 UNIT TABLET 1 TABLET: at 09:56

## 2021-05-11 RX ADMIN — CEFAZOLIN SODIUM 2 G: 2 INJECTION, SOLUTION INTRAVENOUS at 20:37

## 2021-05-11 RX ADMIN — POTASSIUM CHLORIDE 20 MEQ: 750 TABLET, EXTENDED RELEASE ORAL at 09:56

## 2021-05-11 RX ADMIN — ISOSORBIDE MONONITRATE 60 MG: 60 TABLET, EXTENDED RELEASE ORAL at 09:57

## 2021-05-11 RX ADMIN — SODIUM CHLORIDE 500 ML: 9 INJECTION, SOLUTION INTRAVENOUS at 09:58

## 2021-05-11 RX ADMIN — CALCIUM CARBONATE 600 MG (1,500 MG)-VITAMIN D3 400 UNIT TABLET 1 TABLET: at 20:41

## 2021-05-11 NOTE — PLAN OF CARE
Discharge Planner Post-Acute Rehab OT:      Discharge Plan: home with spouse/daughter A as needed. HHOT     Precautions: fall, wound vac      Current Status:  ADLs: set up ub drg/grooming, basic transfers sba, LB drg min A last recorded OT session but pt stated today (5/8) she dressed self completely w / set up Prior to OT session      IADLs: spouse and daughter A at baseline but pt would like to be IND with simple meal prep and house keeping.      Vision/Cognition: appears intact. Will continue to assess functional cog.      Assessment: during  OT:2# dowel exer in sititng in w/c x25 reps each w/ min rest break between sets, discussed POC , pt progressing w/ adls and activity saeid and transfers, discussed antic next week alternating OT/PT each 3days /week, OT to focus in adls/IADLs/ transfers and set up home exer program to do at TCU outside of therapy and  Once discharge to home.  Cont per POC.       Other Barriers to Discharge (DME, Family Training, etc): pt has several stairs at home (see PT notes). Will need shower chair vs tub bench and pt requesting grab bar by toilet vs RTS.

## 2021-05-11 NOTE — PLAN OF CARE
VS: /81 (BP Location: Right leg)   Pulse 92   Temp 97.4  F (36.3  C) (Oral)   Resp 16   Wt 48.3 kg (106 lb 8 oz)   SpO2 94%   BMI 19.48 kg/m     O2: >90% on RA    Output: Continent using BR    Last BM: 5/10   Activity: A of 1 with GB and walker    Skin: Pelvic wounds, sacral wound    Pain: Denied    CMS: Intact    Dressing: Wound vac to pelvic wounds, mepilex to sacrum all CDI    Diet: Regular. Good appetite   LDA: Wound vac- 125 mmHg continuous. DL PICC    Equipment: Walker, GB, wound vac    Plan: Continue W/ POC    Additional Info: Pt A&O x4. Denies chest pain and SOB. IV abx infused without any issues. DL PICC heparin locked. Dressing CDI.            Patient's most recent vital signs are:     Vital signs:  BP: 132/81  Temp: 97.4  HR: 92  RR: 16  SpO2: 94 %     Patient does not have new respiratory symptoms.  Patient does not have new sore throat.  Patient does not have a fever greater than 99.5.

## 2021-05-11 NOTE — PLAN OF CARE
Pt.A&Ox4. Uses call light appropriately. Assist of 1, GB/walker to BR. Alessandro.groin vac dsgs intact/patent @ 125 mmHg continuous suction. Pt.c/o groin discomfort, occurs after vac dsg changes, but denied needing interventions. Lidoderm patch to (L) arm - off in AM.    0540 - Pt.requested and rec'd Tylenol 650mg po @ 0535 for alessandro.groin discomfort.        Patient's most recent vital signs are:     Vital signs:  BP: 132/81  Temp: 97.4  HR: 92  RR: 16  SpO2: 94 %     Patient does not have new respiratory symptoms.  Patient does not have new sore throat.  Patient does not have a fever greater than 99.5.

## 2021-05-11 NOTE — PLAN OF CARE
VS: VSS, denied chest pain    O2: 99% on RA. SOB with activity    Output: Continent, up to the toilet     Last BM: 05/11/21   Activity: Assist of 1 with walker    Skin: Bilateral groin wound   Pain: denied   CMS: Intact    Dressing: Sacrum dressing CDI. Bilateral groin wound vac intact.    Diet: Regular, fair appetite    LDA: R arm double lumen PICC. Wound vac 125 mmHg continuous    Equipment: Walker, wound vac, Iv pole   Plan: Continue with POC   Additional Info: Alert and oriented x4. Can communicate needs effectively.  On Iv abx, infused w/o issues. Resting in bed with call light in reach.            Patient's most recent vital signs are:     Vital signs:  BP: 133/82  Temp: 97.2  HR: 85  RR: 18  SpO2: 99 %     Patient does not have new respiratory symptoms.  Patient does not have new sore throat.  Patient does not have a fever greater than 99.5.

## 2021-05-11 NOTE — PLAN OF CARE
"Discharge Planner Post-Acute Rehab PT:      Discharge Plan: home with assist from spouse and HH PT     Precautions: falls, woc vac (sites located on ant pelvis 2x near ASISs)     Current Status:  Bed Mobility: SBA  Transfer:  STS FWW SBA  Gait: FWW, 250', SBA  Stairs:Up/Down 12 stairs in stairwell, seated break between up/down, CGA   Balance: SBA      Assessment:  Pt performed stairwell stairs up/down with seated break between, CGA. Pt is obviously fatigued and requires prolonged rest break but states \"I'm really proud of myself for doing that.\" Continue to progress function to improve ease of stairs.     **Recommend no nustep use at this time d/t wound vac sites.     Other Barriers to Discharge (DME, Family Training, etc): family training  "

## 2021-05-12 ENCOUNTER — APPOINTMENT (OUTPATIENT)
Dept: OCCUPATIONAL THERAPY | Facility: SKILLED NURSING FACILITY | Age: 71
End: 2021-05-12
Payer: COMMERCIAL

## 2021-05-12 ENCOUNTER — APPOINTMENT (OUTPATIENT)
Dept: LAB | Facility: CLINIC | Age: 71
End: 2021-05-12
Attending: INTERNAL MEDICINE
Payer: COMMERCIAL

## 2021-05-12 ENCOUNTER — APPOINTMENT (OUTPATIENT)
Dept: PHYSICAL THERAPY | Facility: SKILLED NURSING FACILITY | Age: 71
End: 2021-05-12
Payer: COMMERCIAL

## 2021-05-12 LAB
CREAT SERPL-MCNC: 1.18 MG/DL (ref 0.52–1.04)
GFR SERPL CREATININE-BSD FRML MDRD: 47 ML/MIN/{1.73_M2}

## 2021-05-12 PROCEDURE — 120N000009 HC R&B SNF

## 2021-05-12 PROCEDURE — 250N000013 HC RX MED GY IP 250 OP 250 PS 637: Performed by: INTERNAL MEDICINE

## 2021-05-12 PROCEDURE — 82565 ASSAY OF CREATININE: CPT | Performed by: INTERNAL MEDICINE

## 2021-05-12 PROCEDURE — 97110 THERAPEUTIC EXERCISES: CPT | Mod: GP | Performed by: PHYSICAL THERAPIST

## 2021-05-12 PROCEDURE — 36592 COLLECT BLOOD FROM PICC: CPT | Performed by: INTERNAL MEDICINE

## 2021-05-12 PROCEDURE — 250N000011 HC RX IP 250 OP 636: Performed by: INTERNAL MEDICINE

## 2021-05-12 PROCEDURE — 250N000012 HC RX MED GY IP 250 OP 636 PS 637: Performed by: INTERNAL MEDICINE

## 2021-05-12 PROCEDURE — 97535 SELF CARE MNGMENT TRAINING: CPT | Mod: GO

## 2021-05-12 PROCEDURE — 258N000003 HC RX IP 258 OP 636

## 2021-05-12 PROCEDURE — G0463 HOSPITAL OUTPT CLINIC VISIT: HCPCS

## 2021-05-12 PROCEDURE — 97110 THERAPEUTIC EXERCISES: CPT | Mod: GO

## 2021-05-12 RX ORDER — SODIUM CHLORIDE 9 MG/ML
INJECTION, SOLUTION INTRAVENOUS
Status: COMPLETED
Start: 2021-05-12 | End: 2021-05-12

## 2021-05-12 RX ADMIN — ATORVASTATIN CALCIUM 80 MG: 40 TABLET, FILM COATED ORAL at 08:03

## 2021-05-12 RX ADMIN — CALCIUM CARBONATE 600 MG (1,500 MG)-VITAMIN D3 400 UNIT TABLET 1 TABLET: at 20:31

## 2021-05-12 RX ADMIN — LIDOCAINE 1 PATCH: 560 PATCH PERCUTANEOUS; TOPICAL; TRANSDERMAL at 20:30

## 2021-05-12 RX ADMIN — MYCOPHENOLIC ACID 360 MG: 360 TABLET, DELAYED RELEASE ORAL at 08:02

## 2021-05-12 RX ADMIN — CALCIUM CARBONATE 600 MG (1,500 MG)-VITAMIN D3 400 UNIT TABLET 1 TABLET: at 08:02

## 2021-05-12 RX ADMIN — FLUTICASONE PROPIONATE 1 SPRAY: 50 SPRAY, METERED NASAL at 08:01

## 2021-05-12 RX ADMIN — ACETAMINOPHEN 650 MG: 325 TABLET, FILM COATED ORAL at 22:26

## 2021-05-12 RX ADMIN — ISOSORBIDE MONONITRATE 60 MG: 60 TABLET, EXTENDED RELEASE ORAL at 08:03

## 2021-05-12 RX ADMIN — LISINOPRIL 5 MG: 5 TABLET ORAL at 08:03

## 2021-05-12 RX ADMIN — CEFAZOLIN SODIUM 2 G: 2 INJECTION, SOLUTION INTRAVENOUS at 08:00

## 2021-05-12 RX ADMIN — PANTOPRAZOLE SODIUM 40 MG: 40 TABLET, DELAYED RELEASE ORAL at 05:41

## 2021-05-12 RX ADMIN — TICAGRELOR 90 MG: 90 TABLET ORAL at 08:03

## 2021-05-12 RX ADMIN — SODIUM CHLORIDE, PRESERVATIVE FREE 5 ML: 5 INJECTION INTRAVENOUS at 06:24

## 2021-05-12 RX ADMIN — OXYCODONE HYDROCHLORIDE 5 MG: 5 TABLET ORAL at 12:36

## 2021-05-12 RX ADMIN — ACETAMINOPHEN 650 MG: 325 TABLET, FILM COATED ORAL at 17:51

## 2021-05-12 RX ADMIN — HEPARIN, PORCINE (PF) 10 UNIT/ML INTRAVENOUS SYRINGE 10 ML: at 20:30

## 2021-05-12 RX ADMIN — MYCOPHENOLIC ACID 360 MG: 360 TABLET, DELAYED RELEASE ORAL at 20:31

## 2021-05-12 RX ADMIN — SODIUM CHLORIDE 500 ML: 9 INJECTION, SOLUTION INTRAVENOUS at 08:00

## 2021-05-12 RX ADMIN — PREDNISONE 5 MG: 5 TABLET ORAL at 08:03

## 2021-05-12 RX ADMIN — CEFAZOLIN SODIUM 2 G: 2 INJECTION, SOLUTION INTRAVENOUS at 20:28

## 2021-05-12 RX ADMIN — POTASSIUM CHLORIDE 20 MEQ: 750 TABLET, EXTENDED RELEASE ORAL at 08:02

## 2021-05-12 RX ADMIN — TICAGRELOR 90 MG: 90 TABLET ORAL at 20:31

## 2021-05-12 RX ADMIN — ASPIRIN 81 MG CHEWABLE TABLET 81 MG: 81 TABLET CHEWABLE at 08:02

## 2021-05-12 NOTE — PLAN OF CARE
Discharge Planner Post-Acute Rehab PT:      Discharge Plan: home with assist from spouse and HH PT     Precautions: falls, woc vac (sites located on ant pelvis 2x near ASISs)     Current Status:  Bed Mobility: SBA  Transfer:  STS FWW SBA  Gait: FWW, 250', SBA  Stairs:Up/Down 12 stairs in stairwell, seated break between up/down, CGA   Balance: SBA      Assessment:  Patient continues to demonstrate improved endurance able to ambulate greater distances with FWW. CGA for stairs at this time, requiring seated rest break at top of stairs d/t fatigue.     **Recommend no nustep use at this time d/t wound vac sites.**     Other Barriers to Discharge (DME, Family Training, etc): family training

## 2021-05-12 NOTE — PLAN OF CARE
Pt.A&Ox4. Uses call light appropriately. Assist of 1, GB/walker to BR, continent B&B, small soft BM. Indep.pericares and clothing management. Denies pain, discomfort, CP and SOB. Alessandro.groin with vac dsgs intact @ 125mmHg continuous suction.        Patient's most recent vital signs are:     Vital signs:  BP: 131/81  Temp: 97.7  HR: 81  RR: 18  SpO2: 100 %     Patient does not have new respiratory symptoms.  Patient does not have new sore throat.  Patient does not have a fever greater than 99.5.

## 2021-05-12 NOTE — PLAN OF CARE
Discharge Planner Post-Acute Rehab OT:      Discharge Plan: home with spouse/daughter A as needed. HHOT     Precautions: fall, wound vac      Current Status:  ADLs: indep after set up w/ UB and LB drg, grooming, oral cares, and ub washing, basic transfers sba w/ FWW, bedmobility w/ modified indep     IADLs: spouse and daughter A at baseline but pt would like to be IND with simple meal prep and house keeping.      Vision/Cognition: appears intact. Will continue to assess functional cog.      Assessment: OT: established HEP jeevan ue strengthening w/  theraband th provided pt w/ yellow and red theraband and handout w/ instructions and initial verbal instructions, pt completed HEP w/ yellow theraband overall min A , recommennd review HEP and focus treatment on activity saeid and IADLs, Cont per POC.       Other Barriers to Discharge (DME, Family Training, etc): pt has several stairs at home (see PT notes). Will need shower chair vs tub bench and pt requesting grab bar by toilet vs RTS.

## 2021-05-12 NOTE — PLAN OF CARE
Pt is alert and oriented x 4. Uses call light appropriately and able to make needs known. Had a good appetite. Bilateral groin wound dressing intact, wound vac on continuous suctioning at 125 mmHg. Sacral mepilex CDI. PICC patent, blood return noted and heparin locked. IV abx infused without difficulty. Had a medium BM on this shift. Transfers with 1 assist using walker and gait belt. call light is in reach. Continue with plan of care.      Patient's most recent vital signs are:     Vital signs:  BP: 131/81  Temp: 97.7  HR: 81  RR: 18  SpO2: 100 %     Patient does not have new respiratory symptoms.  Patient does not have new sore throat.  Patient does not have a fever greater than 99.5.

## 2021-05-12 NOTE — PLAN OF CARE
VS: VSS. Denies chest pain   O2: 99% on RA   Output: Continent, up to the toilet    Last BM: 05/12/21   Activity: Assist of 1 with walker   Skin: Bilateral groin wound   Pain: Bilateral groin area pain controlled with PRN oxycodone x1.    CMS: Intact    Dressing: Sacrum dressing CDI. Bilateral groin wound vac dressing changed by WOC.    Diet: Regular    LDA: R arm double lumen PICC. Wound vac 125 mmHg continuous    Equipment: Walker, wound vac, Iv pole   Plan: Continue with POC   Additional Info: Alert and oriented x4. Can communicate needs effectively.  Iv abx infused w/o issues. Resting in bed with call light in reach.            Patient's most recent vital signs are:     Vital signs:  BP: 115/85  Temp: 96.3  HR: 96  RR: 16  SpO2: 99 %     Patient does not have new respiratory symptoms.  Patient does not have new sore throat.  Patient does not have a fever greater than 99.5.

## 2021-05-12 NOTE — PROGRESS NOTES
"[facility] Nurse Inpatient Wound Assessment   Reason for consultation: Evaluate and treat Bilateral groin and buttocks wounds    Assessment  Bilateral groin wounds due to Surgical Wound  Status: healing    5/4: Only assessed buttocks  Buttocks no pressure injury at this time    Treatment Plan  Bilateral groin wound: Negative Pressure Wound Therapy (NPWT) to be changed by WOC RN every Mon/Wed/Fri with small  black foam. Staff RN to assess pump settings set to -125 and dressing integrity every shift. Remove foam dressing and replace with BID normal saline moist gauze dressing if:  -a dressing failure which cannot be repaired within 2 hours  -patient is discharging to home without a home pump  -patient is discharging to a facility outside the local area  -if a dressing is a \"Silver Foam\", remove before Radiation Therapy or MRI    Buttocks prevention plan:  Cleanse the area with NS and pat dry.  Apply No sting film barrier to periwound skin.  Cover wound with Mepilex. Sacral Mepilex (#012577)  Change dressing Q 3 days.  Turn and reposition Q 2hrs.  Ensure pt has Luis-cushion while sitting up in the chair.  FYI- If pt has constant incontinent loose stools needing dressing changes Q shift please discontinue the Mepilex dressing and apply criticaid barrier paste BID and PRN.      Orders Reviewed  Recommended provider order: None, at this time  [facility] Nurse follow-up plan:Monday/WednesdayFriday  Nursing to notify the Provider(s) and re-consult the [facility] Nurse if wound(s) deteriorates or new skin concern.    Patient History  According to provider note(s):    71 yo F s/p EVAR AUI and left to right femoral to femoral bypass graft for 5.9 cm AAA. Initial post-op course was complicated by inferior STEMI requiring emergent cardiac catheterization and PCI to RCA. Patient presenting on 4/23/2021 to ED with subjective fevers and chills and erythema and drainage from right groin wound.      4/24/2021 - Incision and drainage of right groin wound; " packing of the wound with Adaptic, moistened Kerlix gauze, ABD pad     4/25/2021 - Explantation of femoral to femoral bypass graft; femoral to femoral bypass graft using cadaveric homograft (tunneled retropubic); bilateral sartorius muscle flaps; placement of negative pressure wound vac therapy.     4/26/2021 - Cardiac catheterization for post-op EKG changes; RCA stent patent; diffuse vasospasm of LAD, LCx.  Returned to OR for bilateral groin washout and placement of Veraflo wound vac system.    Objective Data  Containment of urine/stool: Continent of bladder and Continent of bowel    Active Diet Order  Orders Placed This Encounter      Regular Diet Adult      Output:   I/O last 3 completed shifts:  In: 480 [P.O.:480]  Out: -     Risk Assessment:   Sensory Perception: 4-->no impairment  Moisture: 4-->rarely moist  Activity: 3-->walks occasionally  Mobility: 3-->slightly limited  Nutrition: 3-->adequate  Friction and Shear: 2-->potential problem  Juan Luis Score: 19                          Labs:   Recent Labs   Lab 05/10/21  0743   ALBUMIN 2.0*   HGB 9.6*   WBC 8.9   CRP 23.0*       Physical Exam  Areas of skin assessed: focused buttocks and bilateral groin    Wound Location:  Bilateral groin     5/3 Right groin                                                  5/12 Right groin           5/3 Left groin                                                    5/12 Left groin    Date of last photo 5/12   Wound History: See above, surgical wounds per vascular surgery.      Vac placed using window paning around wound to protect skin.     Right groin: 7 x 5 x 0.5 cm (at deepest area)  Tunneling up to 0.8 cm at 11 o'clock, 2 cm at 3 o'clock and 1cm at 5 o'clock  90% clean minimally granulating base, 10% scattered slough  Left groin 1.2 x 5 x 0.5 cm  Tunneling up to 0.5 cm at 11 o'clock and 1.5 at 3 o'clock  Clean minimally granulating base    Buttocks: Superficial skin tear and healed blanchable erythema to buttocks.  Not a  pressure injury at this time.           Interventions  Visual inspection and assessment completed   Wound Care Rationale Promote moist wound healing without tissue dehydration , Gently fill dead space to prevent abscess formation  and Provide protection   Wound Care: done per plan of care  Supplies: floor stock, discussed with RN and discussed with patient  Current off-loading measures: Pillows  Current support surface: Standard  Atmos Air mattress  Education provided to: importance of repositioning, plan of care, wound progress and Infection prevention   Discussed plan of care with Patient and Nurse    Dolores Francis RN CWOCN

## 2021-05-13 ENCOUNTER — APPOINTMENT (OUTPATIENT)
Dept: PHYSICAL THERAPY | Facility: SKILLED NURSING FACILITY | Age: 71
End: 2021-05-13
Payer: COMMERCIAL

## 2021-05-13 ENCOUNTER — APPOINTMENT (OUTPATIENT)
Dept: OCCUPATIONAL THERAPY | Facility: SKILLED NURSING FACILITY | Age: 71
End: 2021-05-13
Payer: COMMERCIAL

## 2021-05-13 LAB
LABORATORY COMMENT REPORT: NORMAL
SARS-COV-2 RNA RESP QL NAA+PROBE: NEGATIVE
SPECIMEN SOURCE: NORMAL

## 2021-05-13 PROCEDURE — 87635 SARS-COV-2 COVID-19 AMP PRB: CPT | Performed by: INTERNAL MEDICINE

## 2021-05-13 PROCEDURE — 97110 THERAPEUTIC EXERCISES: CPT | Mod: GO

## 2021-05-13 PROCEDURE — 97110 THERAPEUTIC EXERCISES: CPT | Mod: GP

## 2021-05-13 PROCEDURE — 250N000013 HC RX MED GY IP 250 OP 250 PS 637: Performed by: INTERNAL MEDICINE

## 2021-05-13 PROCEDURE — 258N000003 HC RX IP 258 OP 636

## 2021-05-13 PROCEDURE — 97530 THERAPEUTIC ACTIVITIES: CPT | Mod: GP

## 2021-05-13 PROCEDURE — 120N000009 HC R&B SNF

## 2021-05-13 PROCEDURE — 250N000011 HC RX IP 250 OP 636: Performed by: INTERNAL MEDICINE

## 2021-05-13 PROCEDURE — 250N000012 HC RX MED GY IP 250 OP 636 PS 637: Performed by: INTERNAL MEDICINE

## 2021-05-13 PROCEDURE — 99309 SBSQ NF CARE MODERATE MDM 30: CPT | Performed by: INTERNAL MEDICINE

## 2021-05-13 RX ORDER — SODIUM CHLORIDE 9 MG/ML
INJECTION, SOLUTION INTRAVENOUS
Status: COMPLETED
Start: 2021-05-13 | End: 2021-05-13

## 2021-05-13 RX ADMIN — ATORVASTATIN CALCIUM 80 MG: 40 TABLET, FILM COATED ORAL at 09:06

## 2021-05-13 RX ADMIN — SODIUM CHLORIDE, PRESERVATIVE FREE 5 ML: 5 INJECTION INTRAVENOUS at 09:48

## 2021-05-13 RX ADMIN — TICAGRELOR 90 MG: 90 TABLET ORAL at 09:06

## 2021-05-13 RX ADMIN — LIDOCAINE 1 PATCH: 560 PATCH PERCUTANEOUS; TOPICAL; TRANSDERMAL at 20:18

## 2021-05-13 RX ADMIN — FLUTICASONE PROPIONATE 1 SPRAY: 50 SPRAY, METERED NASAL at 09:06

## 2021-05-13 RX ADMIN — HEPARIN, PORCINE (PF) 10 UNIT/ML INTRAVENOUS SYRINGE 10 ML: at 20:15

## 2021-05-13 RX ADMIN — SODIUM CHLORIDE 500 ML: 9 INJECTION, SOLUTION INTRAVENOUS at 09:09

## 2021-05-13 RX ADMIN — MYCOPHENOLIC ACID 360 MG: 360 TABLET, DELAYED RELEASE ORAL at 09:06

## 2021-05-13 RX ADMIN — TICAGRELOR 90 MG: 90 TABLET ORAL at 20:18

## 2021-05-13 RX ADMIN — ASPIRIN 81 MG CHEWABLE TABLET 81 MG: 81 TABLET CHEWABLE at 09:06

## 2021-05-13 RX ADMIN — ISOSORBIDE MONONITRATE 60 MG: 60 TABLET, EXTENDED RELEASE ORAL at 09:06

## 2021-05-13 RX ADMIN — CALCIUM CARBONATE 600 MG (1,500 MG)-VITAMIN D3 400 UNIT TABLET 1 TABLET: at 09:06

## 2021-05-13 RX ADMIN — PANTOPRAZOLE SODIUM 40 MG: 40 TABLET, DELAYED RELEASE ORAL at 05:56

## 2021-05-13 RX ADMIN — POTASSIUM CHLORIDE 20 MEQ: 750 TABLET, EXTENDED RELEASE ORAL at 09:06

## 2021-05-13 RX ADMIN — CEFAZOLIN SODIUM 2 G: 2 INJECTION, SOLUTION INTRAVENOUS at 09:09

## 2021-05-13 RX ADMIN — LISINOPRIL 5 MG: 5 TABLET ORAL at 09:06

## 2021-05-13 RX ADMIN — PREDNISONE 5 MG: 5 TABLET ORAL at 09:06

## 2021-05-13 RX ADMIN — MYCOPHENOLIC ACID 360 MG: 360 TABLET, DELAYED RELEASE ORAL at 20:18

## 2021-05-13 RX ADMIN — CALCIUM CARBONATE 600 MG (1,500 MG)-VITAMIN D3 400 UNIT TABLET 1 TABLET: at 20:18

## 2021-05-13 RX ADMIN — CEFAZOLIN SODIUM 2 G: 2 INJECTION, SOLUTION INTRAVENOUS at 20:14

## 2021-05-13 NOTE — PLAN OF CARE
VS: Temp: 97.2  F (36.2  C) Temp src: Oral BP: 108/80 Pulse: 92   Resp: 16 SpO2: 97 % O2 Device: None (Room air)     O2: 97% RA   Output: Utilizes toilet, unmeasured.   Last BM: 5/13/21   Activity: A of 1 w/ walker and GB   Skin: Intact, BL groin wound vac   Pain: Denies   CMS: Intact   Dressing: Sacral dressing changed, skin intact   Diet: Regular   LDA: DL PICC R arm, wound vac   Equipment: Walker, wound vac, IV pole   Plan: Continue w/ POC   Additional Info: Pt is alert and oriented x4. Able to make needs known. Denies pain, cp or SOB. IV abx infused without difficulties. Asymptomatic covid test collected, awaiting results. Call light in reach, continue w/ POC.           Patient's most recent vital signs are:     Vital signs:  BP: 108/80  Temp: 97.2  HR: 92  RR: 16  SpO2: 97 %     Patient does not have new respiratory symptoms.  Patient does not have new sore throat.  Patient does not have a fever greater than 99.5.

## 2021-05-13 NOTE — PLAN OF CARE
Discharge Planner Post-Acute Rehab OT:      Discharge Plan: home with spouse/daughter A as needed. HHOT     Precautions: fall, wound vac      Current Status:  ADLs: indep after set up w/ UB and LB drg, grooming, oral cares, and ub washing, basic transfers sba w/ FWW, bedmobility w/ modified indep     IADLs: spouse and daughter A at baseline but pt would like to be IND with simple meal prep and house keeping.      Vision/Cognition: appears intact. Will continue to assess functional cog.      Assessment: pt improving w/ standing saeid and steadiness in standing w/ SPT w/out use of assistive device, the continues to manage wound vac , pt completed HEP w/ yellow theraband indep w/ use of handout as reference. focus treatment on activity saeid  In sitting and standing and IADLs, Cont per POC.       Other Barriers to Discharge (DME, Family Training, etc): pt has several stairs at home (see PT notes). Will need shower chair vs tub bench and pt requesting grab bar by toilet vs RTS.

## 2021-05-13 NOTE — PLAN OF CARE
Discharge Planner Post-Acute Rehab PT:      Discharge Plan: home with assist from spouse and HH PT     Precautions: falls, woc vac (sites located on ant pelvis 2x near ASISs)     Current Status:  Bed Mobility: SBA  Transfer:  STS FWW SBA  Gait: mod-I short gait, up to 250' with walker  Stairs:Up/Down 9 stairs SBA with B rail  Balance: SBA      Assessment:  Pt endorses goal of improving stair tolerance. Introduced 4WW today which pt really liked and discussed how to obtain.     **Recommend no nustep use at this time d/t wound vac sites.**     Other Barriers to Discharge (DME, Family Training, etc): family training, Pt would like 4WW for home (not a discharge barrier)

## 2021-05-13 NOTE — PROGRESS NOTES
[facility]    Medicine Progress Note - Hospitalist Service       Date of Admission:  5/2/2021  Assessment & Plan       71 yo female with hx of CKD s/p LDKT 2004, recent inferior STEMI s/p RCA stent (4/7/21), and SCC of right lung in remission since 2014, with prior fem-fem bypass s/p EVAR 4/6/21 is being admitted to  TCU on 05/02/2021 after hospitalization at [place] from 04/23/2021 to 05/02/2021. She was admitted with sepsis 2/2 right femoral groin access site and perigraft abscess. She underwent groin washout, sartorius muscle flap and redo fem fem bypass with cadaveric artery with 1L EBL on 04/25. Hospital course complicated by  inferior ST elevations, and taken to cath lab, found to have diffuse coronary vasospasm with complete occlusion of RCA that reopened with nitroglycerin.       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 4/27 - 6/8  WOC and vascular surgery f/u  Monitor CBC w/ diff and CMP weekly. If Creatinine clearance increases > 50 ml/min, increase Cefazolin dose to 2gm IV q8h  Follow up with Dr. Flores on 6/3 to establish final duration of antibiotics.  Leucocytosis resolved, continue monitoring.         # Coronary vasospasm  # Hx of Inferior STEMI s/p LIUDMILA to RCA  # HFrEF with an EF 35-40%  Post operatively was found to have inferior ST elevations and taken to cath lab, found to have diffuse coronary vasospasm with complete occlusion of RCA  that reopened with NTG. TTE 4/26 showed EF 35-40% with unchanged inferior WMAs, troponin peaked at 29.5  - Continue lisinopril 5 mg daily, increase as tolerated  - Avoid BB given recent Vasospasm event until she follows up as an outpatient  - Avoid non DHP CCBs (Verapamil, Diltiazem) given HFrEF. Can start Amlodipine 5mg daily in the future if necessary to prevent further vasospasm   - Imdur 60mg daily  - Continue PO ASA 81mg, continue PO Brilinta 90mg BID  - Hematology has recommended lifelong DOAC for recurrent DVT. When safe from a surgical standpoint can discontinue aspirin and restart eliquis and continue ticagrelor 90mg BID.   - Continue PO Lipitor 80mg qday   - Aldosterone blockade for CAD/STEMI ischemic cardiomyopathy     # s/p LDKT 9/20/2004: Stable kidney function, now below baseline, likely due to loss of muscle mass recently.   On MMF, Prednisone  - CMP weekly  - Avoid Nephrotoxins  - Renal dose medications.          # Hypertension: Borderline control.Goal BP: < 130/80  - Monitor      # Anemia: Likely d/t blood loss, and Chronic Renal Disease  Hg 9.6 gm/dl on 5/10   - Continue to monitor weekly     # Cephalic vein thrombosis  # Superficial hematoma in the left distal forearm  Plan for conservative management with  dual antiplatelet therapy        Diet: Regular Diet Adult  Snacks/Supplements Adult: Magic Cup; Between Meals    DVT Prophylaxis: Ambulate every shift  Villavicencio Catheter: not present  Code Status: Full Code           Disposition Plan   Expected discharge: TBD   Entered: Alejandro Newberry MD 05/13/2021, 8:55 AM       The patient's care was discussed with the Patient.    Alejandro Newberry MD  Hospitalist Service  [facility]  Contact information available via [facility] Paging/Directory    ______________________________________________________________________    Interval History     No acute events overnight.   She was pleasant.   Generalized weakness is improving.   No  fever or chills.   No chest pain or palpitations.   No shortness of breath or cough.   No nausea, vomit or abdominal pain.     Data reviewed today: I reviewed all medications, new labs and imaging results over the last 24 hours. I personally reviewed no images or EKG's today.    Physical Exam   Vital Signs: Temp: 97.2  F (36.2  C) Temp src: Oral BP: 108/80 Pulse: 92   Resp: 16 SpO2: 97 % O2 Device: None (Room air)    Weight: 106 lbs 8 oz  Alert, fully oriented, no acute distress, room air, chronically ill   Lungs clear, no crackles or wheezing   CV rrr  Abd soft  R groin wound vac in place  No LE edema  Alert, oriented x 3, no focal deficits.     Data   Recent Labs   Lab 05/12/21  0625 05/10/21  0743   WBC  --  8.9   HGB  --  9.6*   MCV  --  95   PLT  --  375   NA  --  139   POTASSIUM  --  3.2*   CHLORIDE  --  107   CO2  --  24   BUN  --  23   CR 1.18* 1.20*   ANIONGAP  --  8   KAYKAY  --  8.8   GLC  --  84   ALBUMIN  --  2.0*   PROTTOTAL  --  6.0*   BILITOTAL  --  0.3   ALKPHOS  --  133   ALT  --  <6   AST  --  13     No results found for this or any previous visit (from the past 24 hour(s)).  Medications     - MEDICATION INSTRUCTIONS -       - MEDICATION INSTRUCTIONS -         - Skin Test Reading (tuberculin) -   Does not apply Q21 Days     aspirin  81 mg Oral Daily     atorvastatin  80 mg Oral Daily     calcium carbonate 600 mg-vitamin D 400 units  1 tablet Oral BID     ceFAZolin-dextrose  2 g Intravenous Q12H     fluticasone  1 spray Both Nostrils Daily     heparin lock flush  5-10 mL Intracatheter Q24H     isosorbide mononitrate  60 mg Oral Daily     Lidocaine  1 patch Transdermal Q24H     lidocaine   Transdermal Q8H     lisinopril  5 mg Oral Daily     mycophenolic acid  360 mg Oral BID     pantoprazole  40 mg Oral QAM AC     potassium chloride  20 mEq Oral Daily     predniSONE  5 mg Oral Daily     ticagrelor  90 mg Oral Q12H     tuberculin  5 Units Intradermal Q21 Days

## 2021-05-13 NOTE — PLAN OF CARE
Pt is alert and oriented x 4. Able to make needs known. Denies shortness of breath, chest pain or nausea. Managed headache and bilateral groin pain with PRN tylenol x 2. Sacral mepilex intact. Bilateral groin dressing CDI, wound vac on continuous suctioning. PICC patent and heparin locked. IV abx infused without difficulty. Had a large BM. Call light is in reach. Continue with plan of care.       Patient's most recent vital signs are:     Vital signs:  BP: 105/78  Temp: 97.9  HR: 98  RR: 18  SpO2: 97 %     Patient does not have new respiratory symptoms.  Patient does not have new sore throat.  Patient does not have a fever greater than 99.5.

## 2021-05-13 NOTE — PLAN OF CARE
No acute issues overnight. Appears to be sleeping well and has no c/o's as of yet. Alessandro.groin wound vac dsgs intact / patent @ 125mmHg continuous suction. Assist of 1, GB/walker to BR / continent of a large and medium formed/soft BM. Sacral mepilex intact.         Patient's most recent vital signs are:     Vital signs:  BP: 105/78  Temp: 97.9  HR: 98  RR: 18  SpO2: 97 %     Patient does not have new respiratory symptoms.  Patient does not have new sore throat.  Patient does not have a fever greater than 99.5.

## 2021-05-13 NOTE — PROGRESS NOTES
CLINICAL NUTRITION SERVICES - REASSESSMENT NOTE     Nutrition Prescription    RECOMMENDATIONS FOR MDs/PROVIDERS TO ORDER:  None today    Malnutrition Status:    Non-severe malnutrition in the context of acute on chronic illness    Recommendations already ordered by Registered Dietitian (RD):  Continue Magic Cup at HS snack    Future Recommendations:  Adjust supplements pending pt preference      EVALUATION OF THE PROGRESS TOWARD GOALS   Diet: Regular, Magic Cup (orange/berry) at HS snack  Intake: % of meals per flowsheet. On average, pt is ordering 2050 kcal and 87 g protein daily per [employer]. This is likely meeting 100% minimum energy and protein needs. She has a good appetite per RN notes.     NEW FINDINGS   - Bilateral groin surgical wounds are healing per WOC note from 5/12.  - Wt has been stable over the last ~3 weeks. Overall, she has gained 11 lbs (12%) over the last 1 month.   05/05/21 : 48.3 kg (106 lb 8 oz)  05/04/21 : 48.5 kg (107 lb)   05/02/21 : 47.7 kg (105 lb 2.6 oz)  04/10/21 : 48 kg (105 lb 13.1 oz)  04/01/21 : 42.6 kg (94 lb)  03/16/21 : 43.3 kg (95 lb 6.4 oz)  09/08/20 : 42.4 kg (93 lb 8 oz)    MALNUTRITION  % Intake: No decreased intake noted  % Weight Loss: None noted  Subcutaneous Fat Loss: Facial region: mild  Muscle Loss: Temporal, Facial & jaw region, Thoracic region (clavicle, acromium bone, deltoid, trapezius, pectoral), Upper arm (bicep, tricep), Lower arm (forearm), Dorsal hand, Upper leg (quadricep, hamstring) and Posterior calf: moderate   Fluid Accumulation/Edema: Trace  Malnutrition Diagnosis: Non-severe malnutrition in the context of acute on chronic illness    Previous Goals   Patient to consume % of nutritionally adequate meal trays TID, or the equivalent with supplements/snacks.  Evaluation: Met    Previous Nutrition Diagnosis  Predicted inadequate protein-energy intake related to variable appetite as evidenced by pt reliant on PO intakes to meet 100% of  nutritional needs with potential for variation     Evaluation: No change    CURRENT NUTRITION DIAGNOSIS  Predicted inadequate protein-energy intake related to variable appetite as evidenced by pt reliant on PO intakes to meet 100% of nutritional needs with potential for variation        INTERVENTIONS  Implementation  Medical food supplement therapy - continue     Goals  Patient to consume % of nutritionally adequate meal trays TID, or the equivalent with supplements/snacks.    Monitoring/Evaluation  Progress toward goals will be monitored and evaluated per protocol.      Rosalina Rodriguez RD, NANNETTE  TCU/8A Pager (M-F): [phone]  Weekend Pager ([place]): [phone]

## 2021-05-13 NOTE — PROGRESS NOTES
"SW followed up with pt to see if she had any needs or concerns. Pt is very proud of her progress so far in physical therapy and appreciative of TCU staff.    Pt said her only concern was with missing some therapy appointments due to PT and OT being scheduled back-to-back or if WOC arrives on M-W-F prior to a therapy session. Pt stated that she is only taking her pain medication prior to WOC and that it \"knocks her out\" so that she misses therapy. Pt is aware that WOC scheduling is variable and dependent on many factors, she just wants to be able to participate in as many therapy sessions as possible.    Pt had no other questions or concerns at this time. SW will continue to remain available for patient and family support, discharge planning, other resources and support PRN.    Juan R JAMA, [facility]  TCU   Phone: [phone]      "

## 2021-05-14 ENCOUNTER — APPOINTMENT (OUTPATIENT)
Dept: OCCUPATIONAL THERAPY | Facility: SKILLED NURSING FACILITY | Age: 71
End: 2021-05-14
Payer: COMMERCIAL

## 2021-05-14 ENCOUNTER — APPOINTMENT (OUTPATIENT)
Dept: PHYSICAL THERAPY | Facility: SKILLED NURSING FACILITY | Age: 71
End: 2021-05-14
Payer: COMMERCIAL

## 2021-05-14 LAB
ANION GAP SERPL CALCULATED.3IONS-SCNC: 8 MMOL/L (ref 3–14)
BUN SERPL-MCNC: 21 MG/DL (ref 7–30)
CALCIUM SERPL-MCNC: 9 MG/DL (ref 8.5–10.1)
CHLORIDE SERPL-SCNC: 107 MMOL/L (ref 94–109)
CO2 SERPL-SCNC: 24 MMOL/L (ref 20–32)
CREAT SERPL-MCNC: 1.19 MG/DL (ref 0.52–1.04)
ERYTHROCYTE [DISTWIDTH] IN BLOOD BY AUTOMATED COUNT: 20.2 % (ref 10–15)
GFR SERPL CREATININE-BSD FRML MDRD: 47 ML/MIN/{1.73_M2}
GLUCOSE SERPL-MCNC: 86 MG/DL (ref 70–99)
HCT VFR BLD AUTO: 29.9 % (ref 35–47)
HGB BLD-MCNC: 9.1 G/DL (ref 11.7–15.7)
MCH RBC QN AUTO: 29.3 PG (ref 26.5–33)
MCHC RBC AUTO-ENTMCNC: 30.4 G/DL (ref 31.5–36.5)
MCV RBC AUTO: 96 FL (ref 78–100)
PLATELET # BLD AUTO: 293 10E9/L (ref 150–450)
POTASSIUM SERPL-SCNC: 3.9 MMOL/L (ref 3.4–5.3)
RBC # BLD AUTO: 3.11 10E12/L (ref 3.8–5.2)
SODIUM SERPL-SCNC: 139 MMOL/L (ref 133–144)
WBC # BLD AUTO: 6 10E9/L (ref 4–11)

## 2021-05-14 PROCEDURE — 97535 SELF CARE MNGMENT TRAINING: CPT | Mod: GO

## 2021-05-14 PROCEDURE — 250N000012 HC RX MED GY IP 250 OP 636 PS 637: Performed by: INTERNAL MEDICINE

## 2021-05-14 PROCEDURE — 250N000011 HC RX IP 250 OP 636: Performed by: INTERNAL MEDICINE

## 2021-05-14 PROCEDURE — 85027 COMPLETE CBC AUTOMATED: CPT | Performed by: INTERNAL MEDICINE

## 2021-05-14 PROCEDURE — 120N000009 HC R&B SNF

## 2021-05-14 PROCEDURE — 80048 BASIC METABOLIC PNL TOTAL CA: CPT | Performed by: INTERNAL MEDICINE

## 2021-05-14 PROCEDURE — 250N000013 HC RX MED GY IP 250 OP 250 PS 637: Performed by: INTERNAL MEDICINE

## 2021-05-14 PROCEDURE — 97110 THERAPEUTIC EXERCISES: CPT | Mod: GP

## 2021-05-14 PROCEDURE — 36592 COLLECT BLOOD FROM PICC: CPT | Performed by: INTERNAL MEDICINE

## 2021-05-14 PROCEDURE — 258N000003 HC RX IP 258 OP 636

## 2021-05-14 PROCEDURE — 97605 NEG PRS WND THER DME<=50SQCM: CPT

## 2021-05-14 PROCEDURE — 97110 THERAPEUTIC EXERCISES: CPT | Mod: GO

## 2021-05-14 RX ORDER — ACETAMINOPHEN 325 MG/1
975 TABLET ORAL 3 TIMES DAILY PRN
Status: DISCONTINUED | OUTPATIENT
Start: 2021-05-14 | End: 2021-05-23 | Stop reason: HOSPADM

## 2021-05-14 RX ORDER — SODIUM CHLORIDE 9 MG/ML
INJECTION, SOLUTION INTRAVENOUS
Status: COMPLETED
Start: 2021-05-14 | End: 2021-05-14

## 2021-05-14 RX ADMIN — LIDOCAINE 1 PATCH: 560 PATCH PERCUTANEOUS; TOPICAL; TRANSDERMAL at 20:23

## 2021-05-14 RX ADMIN — LISINOPRIL 5 MG: 5 TABLET ORAL at 08:31

## 2021-05-14 RX ADMIN — POTASSIUM CHLORIDE 20 MEQ: 750 TABLET, EXTENDED RELEASE ORAL at 08:31

## 2021-05-14 RX ADMIN — CEFAZOLIN SODIUM 2 G: 2 INJECTION, SOLUTION INTRAVENOUS at 20:22

## 2021-05-14 RX ADMIN — CEFAZOLIN SODIUM 2 G: 2 INJECTION, SOLUTION INTRAVENOUS at 08:31

## 2021-05-14 RX ADMIN — FLUTICASONE PROPIONATE 1 SPRAY: 50 SPRAY, METERED NASAL at 08:31

## 2021-05-14 RX ADMIN — MYCOPHENOLIC ACID 360 MG: 360 TABLET, DELAYED RELEASE ORAL at 08:31

## 2021-05-14 RX ADMIN — TICAGRELOR 90 MG: 90 TABLET ORAL at 20:22

## 2021-05-14 RX ADMIN — PREDNISONE 5 MG: 5 TABLET ORAL at 08:32

## 2021-05-14 RX ADMIN — CALCIUM CARBONATE 600 MG (1,500 MG)-VITAMIN D3 400 UNIT TABLET 1 TABLET: at 20:22

## 2021-05-14 RX ADMIN — SODIUM CHLORIDE, PRESERVATIVE FREE 5 ML: 5 INJECTION INTRAVENOUS at 06:29

## 2021-05-14 RX ADMIN — MYCOPHENOLIC ACID 360 MG: 360 TABLET, DELAYED RELEASE ORAL at 20:22

## 2021-05-14 RX ADMIN — ATORVASTATIN CALCIUM 80 MG: 40 TABLET, FILM COATED ORAL at 08:31

## 2021-05-14 RX ADMIN — Medication 2.5 MG: at 13:50

## 2021-05-14 RX ADMIN — ASPIRIN 81 MG CHEWABLE TABLET 81 MG: 81 TABLET CHEWABLE at 08:31

## 2021-05-14 RX ADMIN — TICAGRELOR 90 MG: 90 TABLET ORAL at 08:32

## 2021-05-14 RX ADMIN — CALCIUM CARBONATE 600 MG (1,500 MG)-VITAMIN D3 400 UNIT TABLET 1 TABLET: at 08:31

## 2021-05-14 RX ADMIN — PANTOPRAZOLE SODIUM 40 MG: 40 TABLET, DELAYED RELEASE ORAL at 05:33

## 2021-05-14 RX ADMIN — ISOSORBIDE MONONITRATE 60 MG: 60 TABLET, EXTENDED RELEASE ORAL at 08:32

## 2021-05-14 RX ADMIN — SODIUM CHLORIDE 500 ML: 9 INJECTION, SOLUTION INTRAVENOUS at 20:30

## 2021-05-14 RX ADMIN — HEPARIN, PORCINE (PF) 10 UNIT/ML INTRAVENOUS SYRINGE 5 ML: at 21:12

## 2021-05-14 RX ADMIN — ACETAMINOPHEN 975 MG: 325 TABLET, FILM COATED ORAL at 13:50

## 2021-05-14 RX ADMIN — SODIUM CHLORIDE, PRESERVATIVE FREE 5 ML: 5 INJECTION INTRAVENOUS at 09:31

## 2021-05-14 NOTE — PROGRESS NOTES
"[facility] Nurse Inpatient Wound Assessment   Reason for consultation: Evaluate and treat Bilateral groin and buttocks wounds    Assessment  Bilateral groin wounds due to Surgical Wound  Status: healing    5/4: Only assessed buttocks  Buttocks no pressure injury at this time    Treatment Plan  Bilateral groin wound: Negative Pressure Wound Therapy (NPWT) to be changed by WOC RN every Mon/Wed/Fri with small  black foam. Staff RN to assess pump settings set to -125 and dressing integrity every shift. Remove foam dressing and replace with BID normal saline moist gauze dressing if:  -a dressing failure which cannot be repaired within 2 hours  -patient is discharging to home without a home pump  -patient is discharging to a facility outside the local area  -if a dressing is a \"Silver Foam\", remove before Radiation Therapy or MRI    Buttocks prevention plan:  Cleanse the area with NS and pat dry.  Apply No sting film barrier to periwound skin.  Cover wound with Mepilex. Sacral Mepilex (#[id number])  Change dressing Q 3 days.  Turn and reposition Q 2hrs.  Ensure pt has Luis-cushion while sitting up in the chair.  FYI- If pt has constant incontinent loose stools needing dressing changes Q shift please discontinue the Mepilex dressing and apply criticaid barrier paste BID and PRN.      Orders Reviewed  Recommended provider order: None, at this time  [facility] Nurse follow-up plan:Monday/WednesdayFriday  Nursing to notify the Provider(s) and re-consult the [facility] Nurse if wound(s) deteriorates or new skin concern.    Patient History  According to provider note(s):    71 yo F s/p EVAR AUI and left to right femoral to femoral bypass graft for 5.9 cm AAA. Initial post-op course was complicated by inferior STEMI requiring emergent cardiac catheterization and PCI to RCA. Patient presenting on 4/23/2021 to ED with subjective fevers and chills and erythema and drainage from right groin wound.      4/24/2021 - Incision and drainage of right groin wound; " packing of the wound with Adaptic, moistened Kerlix gauze, ABD pad     4/25/2021 - Explantation of femoral to femoral bypass graft; femoral to femoral bypass graft using cadaveric homograft (tunneled retropubic); bilateral sartorius muscle flaps; placement of negative pressure wound vac therapy.     4/26/2021 - Cardiac catheterization for post-op EKG changes; RCA stent patent; diffuse vasospasm of LAD, LCx.  Returned to OR for bilateral groin washout and placement of Veraflo wound vac system.    Objective Data  Containment of urine/stool: Continent of bladder and Continent of bowel    Active Diet Order  Orders Placed This Encounter      Regular Diet Adult      Output:   I/O last 3 completed shifts:  In: 1800 [P.O.:1800]  Out: -     Risk Assessment:   Sensory Perception: 4-->no impairment  Moisture: 4-->rarely moist  Activity: 3-->walks occasionally  Mobility: 3-->slightly limited  Nutrition: 3-->adequate  Friction and Shear: 2-->potential problem  Juan Luis Score: 19                          Labs:   Recent Labs   Lab 05/14/21  0632 05/10/21  0743   ALBUMIN  --  2.0*   HGB 9.1* 9.6*   WBC 6.0 8.9   CRP  --  23.0*       Physical Exam  Areas of skin assessed: focused buttocks and bilateral groin    Wound Location:  Bilateral groin     5/3 Right groin                                                  5/12 Right groin           5/3 Left groin                                                    5/12 Left groin    Date of last photo 5/12   Wound History: See above, surgical wounds per vascular surgery.      Vac placed using window paning around wound to protect skin.   5/14: Spoke with MD re: pain meds making pt feel too sleepy. MD adjusted dose.  Pt stated pain was well controlled and didn't feel as sleepy.      Right groin: 7 x 5 x 0.5 cm (at deepest area)   Tunneling up to 0.8 cm at 11 o'clock, 2 cm at 3 o'clock and 1cm at 5 o'clock  90% clean minimally granulating base, 10% scattered slough  Left groin 1.2 x 5 x 0.5  cm  Tunneling up to 0.5 cm at 11 o'clock and 1.5 at 3 o'clock  Clean minimally granulating base    Buttocks: Superficial skin tear and healed blanchable erythema to buttocks.  Not a pressure injury at this time.           Interventions  Visual inspection and assessment completed   Wound Care Rationale Promote moist wound healing without tissue dehydration , Gently fill dead space to prevent abscess formation  and Provide protection   Wound Care: done per plan of care  Supplies: floor stock, discussed with RN and discussed with patient  Current off-loading measures: Pillows  Current support surface: Standard  Atmos Air mattress  Education provided to: importance of repositioning, plan of care, wound progress and Infection prevention   Discussed plan of care with Patient and Nurse    Janice Clarke RN CWOCN

## 2021-05-14 NOTE — PLAN OF CARE
Patient's most recent vital signs are:     Vital signs:  BP: 117/83  Temp: 97.6  HR: 91  RR: 16  SpO2: 95 %     Patient does not have new respiratory symptoms.  Patient does not have new sore throat.  Patient does not have a fever greater than 99.6.    Participating in therapies. Wound vac dressing change this afternoon. Continues on IV antibiotics. Very particular regarding her cares.

## 2021-05-14 NOTE — PLAN OF CARE
Discharge Planner Post-Acute Rehab PT:      Discharge Plan: home with assist from spouse and HH PT     Precautions: falls, woc vac (sites located on ant pelvis 2x near ASISs)     Current Status:  Bed Mobility: SBA  Transfer:  STS FWW SBA  Gait: mod-I short gait, up to 250' with walker  Stairs:Up/Down 9 stairs SBA with B rail  Balance: SBA      Assessment:  PTA: Rainy weather this afternoon, did not head outside. Plan to do this sometime this weekend to trial 4ww outdoors. Pt in agreement with plan. Pt amb on unit using 4ww, becoming more confident with use. <210 ft SBA. -5 min at end of session as pt needing to get back to room to take pain meds prior to wound vac change.     **Recommend no nustep use at this time d/t wound vac sites.**     Other Barriers to Discharge (DME, Family Training, etc): family training, Pt would like 4WW for home (not a discharge barrier)

## 2021-05-14 NOTE — PLAN OF CARE
Pt.A&Ox4. Sleeping well and has no c/o's. Alessandro.groin vac dsgs intact and patent @ 125mmHg continuous suction. Continues IV abx via DL open-ended picc RUE. Assist of 1, GB/walker to BR. Lidoderm patch to (R) arm - off in AM.        Patient's most recent vital signs are:     Vital signs:  BP: 113/65  Temp: 96.7  HR: 82  RR: 18  SpO2: 96 %     Patient does not have new respiratory symptoms.  Patient does not have new sore throat.  Patient does not have a fever greater than 99.5.

## 2021-05-14 NOTE — PLAN OF CARE
Pt is alert and oriented x 4. Able to make needs known. Denies pain, shortness of breath, chest pain or nausea. Sacral mepilex intact. Bilateral groin dressing CDI, wound vac on continuous suctioning at 125 mmHg. PICC patent and heparin locked. IV abx infused without difficulty.  Call light is in reach. Continue with plan of care.       Patient's most recent vital signs are:     Vital signs:  BP: 113/65  Temp: 96.7  HR: 82  RR: 18  SpO2: 96 %     Patient does not have new respiratory symptoms.  Patient does not have new sore throat.  Patient does not have a fever greater than 99.5.

## 2021-05-14 NOTE — PLAN OF CARE
Discharge Planner Post-Acute Rehab OT:      Discharge Plan: home with spouse/daughter A as needed. HHOT     Precautions: fall, wound vac      Current Status:  ADLs: indep after set up w/ UB and LB drg, grooming, oral cares, and ub washing, basic transfers sba w/ FWW, bedmobility w/ modified indep     IADLs: spouse and daughter A at baseline but pt would like to be IND with simple meal prep and house keeping.      Vision/Cognition: appears intact. Will continue to assess functional cog.      Assessment: pt improving w/ standing saeid and steadiness in standing w/ SPT w/out use of assistive device, ther continues to manage wound vac or can push walker with wound vac attached, pt completed ADLS and ex in OT clinic.  She rested between ex and after ambulation prn.  Con't to focus treatment on activity saeid in sitting and standing and IADLs per POC.     Other Barriers to Discharge (DME, Family Training, etc): pt has several stairs at home (see PT notes). Will need shower chair vs tub bench and pt requesting grab bar by toilet vs RTS.

## 2021-05-15 ENCOUNTER — APPOINTMENT (OUTPATIENT)
Dept: OCCUPATIONAL THERAPY | Facility: SKILLED NURSING FACILITY | Age: 71
End: 2021-05-15
Payer: COMMERCIAL

## 2021-05-15 PROCEDURE — 250N000012 HC RX MED GY IP 250 OP 636 PS 637: Performed by: INTERNAL MEDICINE

## 2021-05-15 PROCEDURE — 250N000013 HC RX MED GY IP 250 OP 250 PS 637: Performed by: INTERNAL MEDICINE

## 2021-05-15 PROCEDURE — 120N000009 HC R&B SNF

## 2021-05-15 PROCEDURE — 250N000011 HC RX IP 250 OP 636: Performed by: INTERNAL MEDICINE

## 2021-05-15 PROCEDURE — 97535 SELF CARE MNGMENT TRAINING: CPT | Mod: GO

## 2021-05-15 RX ADMIN — ATORVASTATIN CALCIUM 80 MG: 40 TABLET, FILM COATED ORAL at 08:14

## 2021-05-15 RX ADMIN — PREDNISONE 5 MG: 5 TABLET ORAL at 08:14

## 2021-05-15 RX ADMIN — CEFAZOLIN SODIUM 2 G: 2 INJECTION, SOLUTION INTRAVENOUS at 08:11

## 2021-05-15 RX ADMIN — POTASSIUM CHLORIDE 20 MEQ: 750 TABLET, EXTENDED RELEASE ORAL at 08:14

## 2021-05-15 RX ADMIN — TICAGRELOR 90 MG: 90 TABLET ORAL at 20:43

## 2021-05-15 RX ADMIN — CALCIUM CARBONATE 600 MG (1,500 MG)-VITAMIN D3 400 UNIT TABLET 1 TABLET: at 20:44

## 2021-05-15 RX ADMIN — MYCOPHENOLIC ACID 360 MG: 360 TABLET, DELAYED RELEASE ORAL at 20:43

## 2021-05-15 RX ADMIN — FLUTICASONE PROPIONATE 1 SPRAY: 50 SPRAY, METERED NASAL at 08:16

## 2021-05-15 RX ADMIN — CEFAZOLIN SODIUM 2 G: 2 INJECTION, SOLUTION INTRAVENOUS at 20:44

## 2021-05-15 RX ADMIN — HEPARIN, PORCINE (PF) 10 UNIT/ML INTRAVENOUS SYRINGE 10 ML: at 20:44

## 2021-05-15 RX ADMIN — PANTOPRAZOLE SODIUM 40 MG: 40 TABLET, DELAYED RELEASE ORAL at 06:18

## 2021-05-15 RX ADMIN — SODIUM CHLORIDE, PRESERVATIVE FREE 5 ML: 5 INJECTION INTRAVENOUS at 10:31

## 2021-05-15 RX ADMIN — MYCOPHENOLIC ACID 360 MG: 360 TABLET, DELAYED RELEASE ORAL at 08:18

## 2021-05-15 RX ADMIN — ISOSORBIDE MONONITRATE 60 MG: 60 TABLET, EXTENDED RELEASE ORAL at 08:17

## 2021-05-15 RX ADMIN — ASPIRIN 81 MG CHEWABLE TABLET 81 MG: 81 TABLET CHEWABLE at 08:14

## 2021-05-15 RX ADMIN — CALCIUM CARBONATE 600 MG (1,500 MG)-VITAMIN D3 400 UNIT TABLET 1 TABLET: at 08:14

## 2021-05-15 RX ADMIN — TICAGRELOR 90 MG: 90 TABLET ORAL at 08:14

## 2021-05-15 RX ADMIN — LISINOPRIL 5 MG: 5 TABLET ORAL at 08:17

## 2021-05-15 NOTE — PLAN OF CARE
No acute issues overnight. Sleeping well and has no c/o's of pain, discomfort, CP and SOB. Assist of 1, GB/walker to BR. Bilateral groin with vac dsgs intact / patent @ 125mmHg continuous suction. Continues IV abx via DL open-ended picc RUE.         Patient's most recent vital signs are:     Vital signs:  BP: 117/83  Temp: 97.6  HR: 91  RR: 16  SpO2: 95 %     Patient does not have new respiratory symptoms.  Patient does not have new sore throat.  Patient does not have a fever greater than 99.5.

## 2021-05-15 NOTE — PLAN OF CARE
Up to BR with assist of one and walker. Wound vac intact to both groins at 125 hMG. Denied any pain this shift. Rectal area slightly pink, Critic aid ointment applied to protect area.

## 2021-05-15 NOTE — PLAN OF CARE
The patient is alert and oriented x 3. VSS. Able to make needs known. Denies pain or discomfort. The WVAC to groin is intact at continuous 125 mmhg vacuum suctioning. Continue to monitor for comfort.         Patient's most recent vital signs are:     Vital signs:  BP: 117/83  Temp: 97.6  HR: 91  RR: 16  SpO2: 95 %     Patient does not have new respiratory symptoms.  Patient does not have new sore throat.  Patient does not have a fever greater than 99.5.

## 2021-05-15 NOTE — PLAN OF CARE
"Discharge Planner Post-Acute Rehab OT:      Discharge Plan: home with spouse/daughter A as needed. HHOT     Precautions: fall, wound vac      Current Status:  ADLs: indep after set up w/ UB and LB drg, grooming, oral cares, and ub washing, basic transfers sba w/ FWW, bedmobility w/ modified indep     IADLs: spouse and daughter A at baseline but pt would like to be IND with simple meal prep and house keeping.      Vision/Cognition: appears intact. Will continue to assess functional cog.      Assessment: provided SBA and managed pt's wound vac with bed mob, donshari espinal/fareed after set-up.  Pt used 4WW SBA and amb/used elevator to tub/shower combo and trialed a standard shower chair without a back.  Pt given cues of modified method stepping in/out of tub, pt used the wall for support and liked the shower chair.  No LOB.  Pt also stood at the dynavision for wt shifting and increasing activity tolerance.   provided pt with handout for AE in bathroom, she reports her toilet set-up is okay but needs a different shower chair and she plans to purchase a 26\" reacher.  Pt will purchase items via [employer] or [employer].    Con't to focus treatment on activity saeid in sitting and standing and IADLs per POC and alternate days of therapy with PT.     Other Barriers to Discharge (DME, Family Training, etc): pt has several stairs at home (see PT notes). Pt reports her home set up for toileting is okay without adaptation.  She was given a handout for bathroom AE and plans to purchase a shower chair (current one is too big for her tub) and a reacher via [employer] or [employer].           "

## 2021-05-16 PROCEDURE — 250N000013 HC RX MED GY IP 250 OP 250 PS 637: Performed by: INTERNAL MEDICINE

## 2021-05-16 PROCEDURE — 250N000011 HC RX IP 250 OP 636: Performed by: INTERNAL MEDICINE

## 2021-05-16 PROCEDURE — 120N000009 HC R&B SNF

## 2021-05-16 PROCEDURE — 250N000012 HC RX MED GY IP 250 OP 636 PS 637: Performed by: INTERNAL MEDICINE

## 2021-05-16 RX ADMIN — LISINOPRIL 5 MG: 5 TABLET ORAL at 08:31

## 2021-05-16 RX ADMIN — ISOSORBIDE MONONITRATE 60 MG: 60 TABLET, EXTENDED RELEASE ORAL at 08:32

## 2021-05-16 RX ADMIN — CEFAZOLIN SODIUM 2 G: 2 INJECTION, SOLUTION INTRAVENOUS at 20:56

## 2021-05-16 RX ADMIN — PREDNISONE 5 MG: 5 TABLET ORAL at 08:31

## 2021-05-16 RX ADMIN — HEPARIN, PORCINE (PF) 10 UNIT/ML INTRAVENOUS SYRINGE 5 ML: at 20:22

## 2021-05-16 RX ADMIN — Medication 2.5 MG: at 20:41

## 2021-05-16 RX ADMIN — MYCOPHENOLIC ACID 360 MG: 360 TABLET, DELAYED RELEASE ORAL at 21:22

## 2021-05-16 RX ADMIN — SODIUM CHLORIDE, PRESERVATIVE FREE 5 ML: 5 INJECTION INTRAVENOUS at 09:27

## 2021-05-16 RX ADMIN — PANTOPRAZOLE SODIUM 40 MG: 40 TABLET, DELAYED RELEASE ORAL at 05:00

## 2021-05-16 RX ADMIN — Medication 2.5 MG: at 18:23

## 2021-05-16 RX ADMIN — ACETAMINOPHEN 975 MG: 325 TABLET, FILM COATED ORAL at 16:05

## 2021-05-16 RX ADMIN — CALCIUM CARBONATE 600 MG (1,500 MG)-VITAMIN D3 400 UNIT TABLET 1 TABLET: at 21:23

## 2021-05-16 RX ADMIN — TICAGRELOR 90 MG: 90 TABLET ORAL at 08:31

## 2021-05-16 RX ADMIN — TICAGRELOR 90 MG: 90 TABLET ORAL at 21:23

## 2021-05-16 RX ADMIN — CEFAZOLIN SODIUM 2 G: 2 INJECTION, SOLUTION INTRAVENOUS at 08:30

## 2021-05-16 RX ADMIN — MYCOPHENOLIC ACID 360 MG: 360 TABLET, DELAYED RELEASE ORAL at 08:30

## 2021-05-16 RX ADMIN — POTASSIUM CHLORIDE 20 MEQ: 750 TABLET, EXTENDED RELEASE ORAL at 08:31

## 2021-05-16 RX ADMIN — FLUTICASONE PROPIONATE 1 SPRAY: 50 SPRAY, METERED NASAL at 08:30

## 2021-05-16 RX ADMIN — ASPIRIN 81 MG CHEWABLE TABLET 81 MG: 81 TABLET CHEWABLE at 08:31

## 2021-05-16 RX ADMIN — CALCIUM CARBONATE 600 MG (1,500 MG)-VITAMIN D3 400 UNIT TABLET 1 TABLET: at 08:31

## 2021-05-16 RX ADMIN — LIDOCAINE 1 PATCH: 560 PATCH PERCUTANEOUS; TOPICAL; TRANSDERMAL at 20:22

## 2021-05-16 RX ADMIN — ATORVASTATIN CALCIUM 80 MG: 40 TABLET, FILM COATED ORAL at 08:31

## 2021-05-16 NOTE — PLAN OF CARE
No acute issues overnight. Sleeps well between cares. Bilateral groin wounds with vac dsgs intact / patent @ 125mmHg continuous suction. Pt.has no c/o's pain, discomfort, CP and SOB. SBA/walker to BR. Continues IV abx via DL open-ended picc RUE.      Patient's most recent vital signs are:     Vital signs:  BP: 125/80  Temp: 97.5  HR: 98  RR: 17  SpO2: 95 %     Patient does not have new respiratory symptoms.  Patient does not have new sore throat.  Patient does not have a fever greater than 99.5.

## 2021-05-16 NOTE — PLAN OF CARE
Pt is alert and oriented. Up with assist of 1 and walker. Denies shortness of breath/chest pain. Continent of bowel/bladder. Bm x2 this shift. Wound vac @ 125 mmHg continuous suction. Dressings CDI. Sacral mepilex to coccyx CDI. Due to be changed tomorrow. Barrier cream applied around rectal area per pt request. Area slightly red. IV antibiotic infused via RUE picc with out issues. Denies pain. Continue POC.       Patient's most recent vital signs are:     Vital signs:  BP: 125/80  Temp: 97.5  HR: 98  RR: 17  SpO2: 95 %     Patient does not have new respiratory symptoms.  Patient does not have new sore throat.  Patient does not have a fever greater than 99.5.

## 2021-05-16 NOTE — PLAN OF CARE
Patient's most recent vital signs are:     Vital signs:  BP: 130/95  Temp: 96.1  HR: 90  RR: 16  SpO2: 98 %     Patient does not have new respiratory symptoms.  Patient does not have new sore throat.  Patient does not have a fever greater than 99.5.    Alert and verbally makes needs known. Up to BR with walker and SBA. Continent of bowel and bladder. Became SOB after using BR-Pulse-106, O2 sats-96/RA. After resting on edge of bed and then lying down, states she is feeling better. P-90; O2 sats-98/RA. No further SOB. States this happened last evening, but resolved quickly. Continue to monitor.

## 2021-05-17 ENCOUNTER — APPOINTMENT (OUTPATIENT)
Dept: OCCUPATIONAL THERAPY | Facility: SKILLED NURSING FACILITY | Age: 71
End: 2021-05-17
Payer: COMMERCIAL

## 2021-05-17 ENCOUNTER — APPOINTMENT (OUTPATIENT)
Dept: LAB | Facility: CLINIC | Age: 71
End: 2021-05-17
Attending: INTERNAL MEDICINE
Payer: COMMERCIAL

## 2021-05-17 LAB
ALBUMIN SERPL-MCNC: 2 G/DL (ref 3.4–5)
ALP SERPL-CCNC: 136 U/L (ref 40–150)
ALT SERPL W P-5'-P-CCNC: <6 U/L (ref 0–50)
ANION GAP SERPL CALCULATED.3IONS-SCNC: 5 MMOL/L (ref 3–14)
AST SERPL W P-5'-P-CCNC: 11 U/L (ref 0–45)
BASOPHILS # BLD AUTO: 0 10E9/L (ref 0–0.2)
BASOPHILS NFR BLD AUTO: 0.8 %
BILIRUB SERPL-MCNC: 0.2 MG/DL (ref 0.2–1.3)
BUN SERPL-MCNC: 20 MG/DL (ref 7–30)
CALCIUM SERPL-MCNC: 9 MG/DL (ref 8.5–10.1)
CHLORIDE SERPL-SCNC: 108 MMOL/L (ref 94–109)
CO2 SERPL-SCNC: 25 MMOL/L (ref 20–32)
CREAT SERPL-MCNC: 1.2 MG/DL (ref 0.52–1.04)
CRP SERPL-MCNC: 17 MG/L (ref 0–8)
DIFFERENTIAL METHOD BLD: ABNORMAL
EOSINOPHIL # BLD AUTO: 0.3 10E9/L (ref 0–0.7)
EOSINOPHIL NFR BLD AUTO: 5.6 %
ERYTHROCYTE [DISTWIDTH] IN BLOOD BY AUTOMATED COUNT: 19.8 % (ref 10–15)
GFR SERPL CREATININE-BSD FRML MDRD: 46 ML/MIN/{1.73_M2}
GLUCOSE SERPL-MCNC: 81 MG/DL (ref 70–99)
HCT VFR BLD AUTO: 31.3 % (ref 35–47)
HGB BLD-MCNC: 9.5 G/DL (ref 11.7–15.7)
IMM GRANULOCYTES # BLD: 0 10E9/L (ref 0–0.4)
IMM GRANULOCYTES NFR BLD: 0.4 %
LYMPHOCYTES # BLD AUTO: 0.4 10E9/L (ref 0.8–5.3)
LYMPHOCYTES NFR BLD AUTO: 7.6 %
MCH RBC QN AUTO: 28.9 PG (ref 26.5–33)
MCHC RBC AUTO-ENTMCNC: 30.4 G/DL (ref 31.5–36.5)
MCV RBC AUTO: 95 FL (ref 78–100)
MONOCYTES # BLD AUTO: 0.6 10E9/L (ref 0–1.3)
MONOCYTES NFR BLD AUTO: 12.1 %
NEUTROPHILS # BLD AUTO: 3.8 10E9/L (ref 1.6–8.3)
NEUTROPHILS NFR BLD AUTO: 73.5 %
NRBC # BLD AUTO: 0 10*3/UL
NRBC BLD AUTO-RTO: 0 /100
PLATELET # BLD AUTO: 299 10E9/L (ref 150–450)
POTASSIUM SERPL-SCNC: 3.9 MMOL/L (ref 3.4–5.3)
PROT SERPL-MCNC: 5.7 G/DL (ref 6.8–8.8)
RBC # BLD AUTO: 3.29 10E12/L (ref 3.8–5.2)
SODIUM SERPL-SCNC: 138 MMOL/L (ref 133–144)
WBC # BLD AUTO: 5.1 10E9/L (ref 4–11)

## 2021-05-17 PROCEDURE — 97110 THERAPEUTIC EXERCISES: CPT | Mod: GO

## 2021-05-17 PROCEDURE — 85025 COMPLETE CBC W/AUTO DIFF WBC: CPT | Performed by: INTERNAL MEDICINE

## 2021-05-17 PROCEDURE — 99308 SBSQ NF CARE LOW MDM 20: CPT | Performed by: INTERNAL MEDICINE

## 2021-05-17 PROCEDURE — 258N000003 HC RX IP 258 OP 636

## 2021-05-17 PROCEDURE — 250N000013 HC RX MED GY IP 250 OP 250 PS 637: Performed by: INTERNAL MEDICINE

## 2021-05-17 PROCEDURE — 250N000011 HC RX IP 250 OP 636: Performed by: INTERNAL MEDICINE

## 2021-05-17 PROCEDURE — 250N000012 HC RX MED GY IP 250 OP 636 PS 637: Performed by: INTERNAL MEDICINE

## 2021-05-17 PROCEDURE — 80053 COMPREHEN METABOLIC PANEL: CPT | Performed by: INTERNAL MEDICINE

## 2021-05-17 PROCEDURE — G0463 HOSPITAL OUTPT CLINIC VISIT: HCPCS

## 2021-05-17 PROCEDURE — 36592 COLLECT BLOOD FROM PICC: CPT | Performed by: INTERNAL MEDICINE

## 2021-05-17 PROCEDURE — 120N000009 HC R&B SNF

## 2021-05-17 PROCEDURE — 86140 C-REACTIVE PROTEIN: CPT | Performed by: INTERNAL MEDICINE

## 2021-05-17 RX ORDER — SODIUM CHLORIDE 9 MG/ML
INJECTION, SOLUTION INTRAVENOUS
Status: COMPLETED
Start: 2021-05-17 | End: 2021-05-17

## 2021-05-17 RX ADMIN — MYCOPHENOLIC ACID 360 MG: 360 TABLET, DELAYED RELEASE ORAL at 20:17

## 2021-05-17 RX ADMIN — CEFAZOLIN SODIUM 2 G: 2 INJECTION, SOLUTION INTRAVENOUS at 09:04

## 2021-05-17 RX ADMIN — ATORVASTATIN CALCIUM 80 MG: 40 TABLET, FILM COATED ORAL at 08:54

## 2021-05-17 RX ADMIN — POTASSIUM CHLORIDE 20 MEQ: 750 TABLET, EXTENDED RELEASE ORAL at 08:55

## 2021-05-17 RX ADMIN — PANTOPRAZOLE SODIUM 40 MG: 40 TABLET, DELAYED RELEASE ORAL at 06:42

## 2021-05-17 RX ADMIN — TICAGRELOR 90 MG: 90 TABLET ORAL at 08:54

## 2021-05-17 RX ADMIN — SODIUM CHLORIDE, PRESERVATIVE FREE 5 ML: 5 INJECTION INTRAVENOUS at 10:09

## 2021-05-17 RX ADMIN — PREDNISONE 5 MG: 5 TABLET ORAL at 08:54

## 2021-05-17 RX ADMIN — LISINOPRIL 5 MG: 5 TABLET ORAL at 08:58

## 2021-05-17 RX ADMIN — ISOSORBIDE MONONITRATE 60 MG: 60 TABLET, EXTENDED RELEASE ORAL at 08:57

## 2021-05-17 RX ADMIN — CALCIUM CARBONATE 600 MG (1,500 MG)-VITAMIN D3 400 UNIT TABLET 1 TABLET: at 08:54

## 2021-05-17 RX ADMIN — MYCOPHENOLIC ACID 360 MG: 360 TABLET, DELAYED RELEASE ORAL at 08:55

## 2021-05-17 RX ADMIN — CEFAZOLIN SODIUM 2 G: 2 INJECTION, SOLUTION INTRAVENOUS at 20:09

## 2021-05-17 RX ADMIN — CALCIUM CARBONATE 600 MG (1,500 MG)-VITAMIN D3 400 UNIT TABLET 1 TABLET: at 20:17

## 2021-05-17 RX ADMIN — ASPIRIN 81 MG CHEWABLE TABLET 81 MG: 81 TABLET CHEWABLE at 08:53

## 2021-05-17 RX ADMIN — FLUTICASONE PROPIONATE 1 SPRAY: 50 SPRAY, METERED NASAL at 08:53

## 2021-05-17 RX ADMIN — TICAGRELOR 90 MG: 90 TABLET ORAL at 20:17

## 2021-05-17 RX ADMIN — SODIUM CHLORIDE, PRESERVATIVE FREE 5 ML: 5 INJECTION INTRAVENOUS at 06:43

## 2021-05-17 RX ADMIN — SODIUM CHLORIDE 500 ML: 9 INJECTION, SOLUTION INTRAVENOUS at 09:30

## 2021-05-17 RX ADMIN — HEPARIN, PORCINE (PF) 10 UNIT/ML INTRAVENOUS SYRINGE 10 ML: at 20:58

## 2021-05-17 RX ADMIN — Medication 2.5 MG: at 12:22

## 2021-05-17 RX ADMIN — ACETAMINOPHEN 975 MG: 325 TABLET, FILM COATED ORAL at 12:22

## 2021-05-17 NOTE — PLAN OF CARE
Patient's most recent vital signs are:     Vital signs:  BP: 131/91  Temp: 98  HR: 79  RR: 16  SpO2: 96 %     Patient does not have new respiratory symptoms.  Patient does not have new sore throat.  Patient does nothave a fever greater than 99.5.         VS: BP (!) 131/91 (BP Location: Right leg)   Pulse 79   Temp 98  F (36.7  C) (Oral)   Resp 16   Wt 48.3 kg (106 lb 8 oz)   SpO2 96%   BMI 19.48 kg/m       O2: 96% at RA   Output: Uses a BR   Last BM: 5/16   Activity: SBA with a walker   Skin: Dressing Intact with   Pain: C/O HA, Oxycodone 2.5 mg given twice this shift with relief   CMS: intact   Dressing: Intact, wound vac   Diet: Regular diet   LDA: PICC, dressing changed   Equipment: walker   Plan: Continue with POC   Additional Info:
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
## 2021-05-17 NOTE — PROGRESS NOTES
"[facility] Nurse Inpatient Wound Assessment   Reason for consultation: Evaluate and treat Bilateral groin and buttocks wounds    Assessment  Bilateral groin wounds due to Surgical Wound  Status: healing    Treatment Plan  Bilateral groin wound: Negative Pressure Wound Therapy (NPWT) to be changed by WO RN every Mon/Wed/Fri with small  black foam. Staff RN to assess pump settings set to -125 and dressing integrity every shift. Remove foam dressing and replace with BID normal saline moist gauze dressing if:  -a dressing failure which cannot be repaired within 2 hours  -patient is discharging to home without a home pump  -patient is discharging to a facility outside the local area  -if a dressing is a \"Silver Foam\", remove before Radiation Therapy or MRI    Buttocks prevention plan:  Cleanse the area with NS and pat dry.  Apply No sting film barrier to periwound skin.  Cover wound with Mepilex. Sacral Mepilex (#[id number])  Change dressing Q 3 days.  Turn and reposition Q 2hrs.  Ensure pt has Luis-cushion while sitting up in the chair.  FYI- If pt has constant incontinent loose stools needing dressing changes Q shift please discontinue the Mepilex dressing and apply criticaid barrier paste BID and PRN.      Orders Reviewed  Recommended provider order: None, at this time  [facility] Nurse follow-up plan:Monday/WednesdayFriday  Nursing to notify the Provider(s) and re-consult the [facility] Nurse if wound(s) deteriorates or new skin concern.    Patient History  According to provider note(s):    71 yo F s/p EVAR AUI and left to right femoral to femoral bypass graft for 5.9 cm AAA. Initial post-op course was complicated by inferior STEMI requiring emergent cardiac catheterization and PCI to RCA. Patient presenting on 4/23/2021 to ED with subjective fevers and chills and erythema and drainage from right groin wound.      4/24/2021 - Incision and drainage of right groin wound; packing of the wound with Adaptic, moistened Kerlix gauze, ABD " pad     4/25/2021 - Explantation of femoral to femoral bypass graft; femoral to femoral bypass graft using cadaveric homograft (tunneled retropubic); bilateral sartorius muscle flaps; placement of negative pressure wound vac therapy.     4/26/2021 - Cardiac catheterization for post-op EKG changes; RCA stent patent; diffuse vasospasm of LAD, LCx.  Returned to OR for bilateral groin washout and placement of Veraflo wound vac system.    Objective Data  Containment of urine/stool: Continent of bladder and Continent of bowel    Active Diet Order  Orders Placed This Encounter      Regular Diet Adult      Output:   No intake/output data recorded.    Risk Assessment:   Sensory Perception: 4-->no impairment  Moisture: 4-->rarely moist  Activity: 4-->walks frequently  Mobility: 4-->no limitation  Nutrition: 3-->adequate  Friction and Shear: 3-->no apparent problem  Juan Luis Score: 22                          Labs:   Recent Labs   Lab 05/17/21  0647   ALBUMIN 2.0*   HGB 9.5*   WBC 5.1   CRP 17.0*       Physical Exam  Areas of skin assessed: focused buttocks and bilateral groin    Wound Location:  Bilateral groin     5/3 Right groin                                                  5/12 Right groin           5/3 Left groin                                                    5/12 Left groin    Date of last photo 5/12   Wound History: See above, surgical wounds per vascular surgery.      Vac placed using window paning around wound to protect skin.   5/14: Spoke with MD re: pain meds making pt feel too sleepy. MD adjusted dose.  Pt stated pain was well controlled and didn't feel as sleepy.      Right groin: 7 x 5 x 0.5 cm (at deepest area)   Tunneling up to 0.8 cm at 11 o'clock, 2 cm at 3 o'clock and 1cm at 5 o'clock  90% clean minimally granulating base, 10% scattered slough  Left groin 1.2 x 5 x 0.5 cm  Tunneling up to 0.5 cm at 11 o'clock and 1.5 at 3 o'clock  Clean minimally granulating base    Interventions  Visual inspection and  assessment completed   Wound Care Rationale Promote moist wound healing without tissue dehydration , Gently fill dead space to prevent abscess formation  and Provide protection   Wound Care: done per plan of care  Supplies: floor stock, discussed with RN and discussed with patient  Current off-loading measures: Pillows  Current support surface: Standard  Atmos Air mattress  Education provided to: importance of repositioning, plan of care, wound progress and Infection prevention   Discussed plan of care with Patient and Nurse    Dolores Francis RN CWOCN
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
## 2021-05-17 NOTE — PLAN OF CARE
Patient is A&O x 4, able to make needs known. Slept comfortably most of the shift .Woke up twice to use the BR.Transfers with assist one using a walker to BR. Wound vac patent with serosanguinous.LBM 5/16.          Patient's most recent vital signs are:     Vital signs:  BP: 131/91  Temp: 98  HR: 79  RR: 16  SpO2: 96 %     Patient does not have new respiratory symptoms.  Patient does not have new sore throat.  Patient does not have a fever greater than 99.5.
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
## 2021-05-17 NOTE — PLAN OF CARE
"Patient received a phone call round 1000 from a MD from  [facility]. Patient stated \" My son in on ventilator and they don't know if he is going to make it. My son's friends have been looking for him since Friday pm.\"  Patient crying.  Patient called her  and he is going to the hospital now.  I asked the patient if I could call the carie for her and she said no,that talking to her  helps her.  I offered to pray for her son.  I did update Juan R FRANCO what was going on.   I will continue to check in with the patient and see what I can do for her.  "
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
## 2021-05-18 ENCOUNTER — APPOINTMENT (OUTPATIENT)
Dept: PHYSICAL THERAPY | Facility: SKILLED NURSING FACILITY | Age: 71
End: 2021-05-18
Payer: COMMERCIAL

## 2021-05-18 PROCEDURE — 250N000013 HC RX MED GY IP 250 OP 250 PS 637: Performed by: INTERNAL MEDICINE

## 2021-05-18 PROCEDURE — 250N000011 HC RX IP 250 OP 636: Performed by: INTERNAL MEDICINE

## 2021-05-18 PROCEDURE — 97530 THERAPEUTIC ACTIVITIES: CPT | Mod: GP

## 2021-05-18 PROCEDURE — 97110 THERAPEUTIC EXERCISES: CPT | Mod: GP

## 2021-05-18 PROCEDURE — 258N000003 HC RX IP 258 OP 636

## 2021-05-18 PROCEDURE — 120N000009 HC R&B SNF

## 2021-05-18 PROCEDURE — 250N000012 HC RX MED GY IP 250 OP 636 PS 637: Performed by: INTERNAL MEDICINE

## 2021-05-18 RX ORDER — SODIUM CHLORIDE 9 MG/ML
INJECTION, SOLUTION INTRAVENOUS
Status: COMPLETED
Start: 2021-05-18 | End: 2021-05-18

## 2021-05-18 RX ADMIN — ASPIRIN 81 MG CHEWABLE TABLET 81 MG: 81 TABLET CHEWABLE at 08:39

## 2021-05-18 RX ADMIN — ATORVASTATIN CALCIUM 80 MG: 40 TABLET, FILM COATED ORAL at 08:39

## 2021-05-18 RX ADMIN — SODIUM CHLORIDE 500 ML: 9 INJECTION, SOLUTION INTRAVENOUS at 20:27

## 2021-05-18 RX ADMIN — POTASSIUM CHLORIDE 20 MEQ: 750 TABLET, EXTENDED RELEASE ORAL at 08:39

## 2021-05-18 RX ADMIN — TICAGRELOR 90 MG: 90 TABLET ORAL at 20:00

## 2021-05-18 RX ADMIN — HEPARIN, PORCINE (PF) 10 UNIT/ML INTRAVENOUS SYRINGE 10 ML: at 20:00

## 2021-05-18 RX ADMIN — MYCOPHENOLIC ACID 360 MG: 360 TABLET, DELAYED RELEASE ORAL at 08:38

## 2021-05-18 RX ADMIN — MYCOPHENOLIC ACID 360 MG: 360 TABLET, DELAYED RELEASE ORAL at 20:01

## 2021-05-18 RX ADMIN — CALCIUM CARBONATE 600 MG (1,500 MG)-VITAMIN D3 400 UNIT TABLET 1 TABLET: at 08:40

## 2021-05-18 RX ADMIN — SODIUM CHLORIDE, PRESERVATIVE FREE 10 ML: 5 INJECTION INTRAVENOUS at 09:26

## 2021-05-18 RX ADMIN — CALCIUM CARBONATE 600 MG (1,500 MG)-VITAMIN D3 400 UNIT TABLET 1 TABLET: at 20:01

## 2021-05-18 RX ADMIN — ACETAMINOPHEN 975 MG: 325 TABLET, FILM COATED ORAL at 16:47

## 2021-05-18 RX ADMIN — PREDNISONE 5 MG: 5 TABLET ORAL at 08:40

## 2021-05-18 RX ADMIN — LISINOPRIL 5 MG: 5 TABLET ORAL at 08:40

## 2021-05-18 RX ADMIN — TICAGRELOR 90 MG: 90 TABLET ORAL at 08:40

## 2021-05-18 RX ADMIN — CEFAZOLIN SODIUM 2 G: 2 INJECTION, SOLUTION INTRAVENOUS at 08:20

## 2021-05-18 RX ADMIN — Medication 5 MG: at 20:01

## 2021-05-18 RX ADMIN — CEFAZOLIN SODIUM 2 G: 2 INJECTION, SOLUTION INTRAVENOUS at 20:27

## 2021-05-18 RX ADMIN — ISOSORBIDE MONONITRATE 60 MG: 60 TABLET, EXTENDED RELEASE ORAL at 08:40

## 2021-05-18 RX ADMIN — PANTOPRAZOLE SODIUM 40 MG: 40 TABLET, DELAYED RELEASE ORAL at 06:59

## 2021-05-18 RX ADMIN — FLUTICASONE PROPIONATE 1 SPRAY: 50 SPRAY, METERED NASAL at 08:38

## 2021-05-18 NOTE — PLAN OF CARE
Discharge Planner Post-Acute Rehab PT:      Discharge Plan: home with assist from spouse and HH PT     Precautions: falls, woc vac (sites located on ant pelvis 2x near ASISs)     Current Status:  Bed Mobility: SBA  Transfer:  STS FWW SBA  Gait: mod-I short gait, up to 250' with walker  Stairs:Up/Down 9 stairs SBA with B rail  Balance: SBA      Assessment:  PTA: Pt reporting her daughter is now at hospital with her son, too much emotionally for her  to handle at this point. No new information shared. Pt in agreement with amb today, requesting to focus solely on this, did not want to amb outdoors today.    Room re-arranged to allow more space for pt to move around room with 4ww. Wound vac placed on 4ww seat, cord in 2nd drawer for greater ease to plug in and out. Pt able to demo amb bed<>bathroom using 4ww and managing wound vac tubing, connecting and disconnecting power. Min cues needed for the latter.  Recommend pt have 1 more day of supervision with task in hopes of no longer needing reminders/cues. OT to re-check Simeon in room tomorrow. Charge nurse aware. Pt in agreement.    **Recommend no nustep use at this time d/t wound vac sites.**     Other Barriers to Discharge (DME, Family Training, etc): family training, Pt would like 4WW for home (not a discharge barrier)

## 2021-05-18 NOTE — ANESTHESIA PREPROCEDURE EVALUATION
Anesthesia Pre-Procedure Evaluation    Patient: Maribel Yang   MRN: [id number] : 1950        Preoperative Diagnosis: Wound infection [T14.8XXA, L08.9]   Procedure : Procedure(s):  IRRIGATION AND DEBRIDEMENT, INGUINAL REGION, I & D PF RIGHT GROIN     Past Medical History:   Diagnosis Date     Abnormal coagulation profile     p [id number] heterozygote      Age-related osteoporosis without current pathological fracture 2019     Anemia      Antiplatelet or antithrombotic long-term use      ASCUS with positive high risk HPV 2015    + HPV 56, 54,& 6, colp - TAL III, Leep =TAL II     Basal cell carcinoma      Depressive disorder 2015     Hypertension      Immunosuppressed status (H)     due meds     Kidney replaced by transplant     Living donor recipient,  Rejection 2005     LSIL (low grade squamous intraepithelial lesion) on Pap smear 2013    +HPV 33 or 45, 61       PAD (peripheral artery disease) (H)      PONV (postoperative nausea and vomiting)      Squamous cell lung cancer (H)      Thrombosis of leg 1967     Unspecified disorder of kidney and ureter     X-linked dominant Alport's syndrome.      Past Surgical History:   Procedure Laterality Date     BIOPSY      Kidney, Lung, Breast     BIOPSY ANAL N/A 2018    Procedure: BIOPSY ANAL;;  Surgeon: Shabbir Leo MD;  [facility]     BYPASS GRAFT FEMORAL FEMORAL Bilateral 2021    Procedure: Axplantation infected graft, femoral to femoral bypass with cadaveric artery, bilateral SATORIUS MUSCLE FLAP CREATION, evacuation of absece, vacuum closure;  Surgeon: Raven Lewis MD;  Location:  OR      TRANSPLANTATION OF KIDNEY  2004    recipient -- done at [facility]     COLONOSCOPY       COLONOSCOPY N/A 2017    Procedure: COMBINED COLONOSCOPY, SINGLE OR MULTIPLE BIOPSY/POLYPECTOMY BY BIOPSY;;  Surgeon: Sushil Hyatt MD;  Location:  GI     COLPOSCOPY,LOOP ELECTRD CERVIX EXCIS  2008    TAL II      CONIZATION LEEP  07/17/2013    Procedure: CONIZATION LEEP;;  Surgeon: Liliana Renteria MD;  [facility]     CONIZATION LEEP N/A 08/17/2016    Procedure: CONIZATION LEEP;  Surgeon: Liliana Renteria MD;  [facility]     CV CORONARY ANGIOGRAM N/A 04/06/2021    Procedure: CV CORONARY ANGIOGRAM;  Surgeon: Sincere Rosas MD;  [facility]     CV CORONARY ANGIOGRAM N/A 04/25/2021    Procedure: Coronary Angiogram;  Surgeon: Sincere Rosas MD;  [facility]     CV PCI STENT DRUG ELUTING N/A 04/06/2021    Procedure: Percutaneous Coronary Intervention Stent Drug Eluting;  Surgeon: Sincere Rosas MD;  [facility]     ENDOVASCULAR REPAIR ANEURYSM AORTOILIAC Bilateral 04/06/2021    Procedure: Endovascular Abdominal Aortic Aneurysm Repair with Aortouniiliac Device and Femoral-Femoral Artery Bypass with [id number] Artegraft;  Surgeon: Abena Ferrara MD;  [facility]     EXAM UNDER ANESTHESIA ANUS  07/15/2014    Procedure: EXAM UNDER ANESTHESIA ANUS;  Surgeon: Radha Musa MD;  [facility]     EXAM UNDER ANESTHESIA ANUS N/A 03/14/2018    Procedure: EXAM UNDER ANESTHESIA ANUS;  Anal Exam Under Anesthesia With Excision of anal lesion, proctoscopy;  Surgeon: Shabbir Leo MD;  [facility]     EYE SURGERY       GENITOURINARY SURGERY       IR OR ANGIOGRAM  04/06/2021     IRRIGATION AND DEBRIDEMENT GROIN Right 04/24/2021    Procedure: IRRIGATION AND DEBRIDEMENT, INGUINAL REGION, I & D PF RIGHT GROIN;  Surgeon: Raven Lewis MD;  [facility]     IRRIGATION AND DEBRIDEMENT GROIN N/A 04/26/2021    Procedure: IRRIGATION AND DEBRIDEMENT, INGUINAL REGION, PLACEMENT OF VERAFLO WOUND VAC, WOUND WASHOUT,;  Surgeon: Raven Lewis MD;  [facility]     LASER CO2 EXCISE VULVA WIDE LOCAL  07/15/2014    Procedure: LASER CO2 EXCISE VULVA WIDE LOCAL;  Surgeon: Liliana Renteria MD;   [facility]     LASER CO2 VAGINA  07/17/2013    Procedure: LASER CO2 VAGINA;;  Surgeon: Liliana Renteria MD;  [facility]     LASER CO2 VAGINA N/A 09/25/2018    Procedure: LASER CO2 VAGINA;  Exam Under Anesthesia, CO2 Laser Ablation of Upper Vagina and Cervix;  Surgeon: Pati Garcia MD;  [facility]     MICROSCOPY ANAL  07/17/2013    Procedure: MICROSCOPY ANAL;  Anal Microscopy,  EUA vagina,Colposcopy Of Vagina And Vulva, Vaginal Biopsies, Omniguide Co2 Laser To Vagina and vulva, Loop Electrosurgical Excision Procedure To Cervix;  Surgeon: Radha Musa MD;  [facility]     MICROSCOPY ANAL  07/15/2014    Procedure: MICROSCOPY ANAL;  Surgeon: Radha Musa MD;  [facility]     PICC DOUBLE LUMEN PLACEMENT Right 04/30/2021    39cm, Basilic vein     TRANSESOPHAGEAL ECHOCARDIOGRAM INTRAOPERATIVE N/A 04/28/2021    Procedure: ECHOCARDIOGRAM, TRANSESOPHAGEAL, INTRAOPERATIVE;  Surgeon: GENERIC ANESTHESIA PROVIDER;  Location: [facility]     VASCULAR SURGERY      Thrombectomy     ZZC NONSPECIFIC PROCEDURE      Thrombectomy     ZZC NONSPECIFIC PROCEDURE  1955 and 1959    Bilater eye surgery - correction for crossed eyes     ZZC NONSPECIFIC PROCEDURE  1998    oopherectomy L     ZZC NONSPECIFIC PROCEDURE  1967    open kidney biopsy - L      Allergies   Allergen Reactions     Blood Transfusion Related (Informational Only) Other (See Comments)     Patient has a history of a clinically significant antibody against RBC antigens.  A delay in compatible RBCs may occur.     Ultracet Nausea and Vomiting and Hives     Hydrocodone Nausea and Vomiting and Hives      Social History     Tobacco Use     Smoking status: Former Smoker     Packs/day: 0.30     Years: 35.00     Pack years: 10.50     Types: Cigarettes     Start date: 1/1/1967     Smokeless tobacco: Never Used     Tobacco comment: Couple times a week. 1-2 cigs 1-2x a week.   Substance Use Topics     Alcohol use: Not Currently      Alcohol/week: 0.0 standard drinks     Frequency: Monthly or less     Drinks per session: 1 or 2     Binge frequency: Less than monthly     Comment: rarely      Wt Readings from Last 1 Encounters:   05/05/21 48.3 kg (106 lb 8 oz)              OUTSIDE LABS:  CBC:   Lab Results   Component Value Date    WBC 5.1 05/17/2021    WBC 6.0 05/14/2021    HGB 9.5 (L) 05/17/2021    HGB 9.1 (L) 05/14/2021    HCT 31.3 (L) 05/17/2021    HCT 29.9 (L) 05/14/2021     05/17/2021     05/14/2021     BMP:   Lab Results   Component Value Date     05/17/2021     05/14/2021    POTASSIUM 3.9 05/17/2021    POTASSIUM 3.9 05/14/2021    CHLORIDE 108 05/17/2021    CHLORIDE 107 05/14/2021    CO2 25 05/17/2021    CO2 24 05/14/2021    BUN 20 05/17/2021    BUN 21 05/14/2021    CR 1.20 (H) 05/17/2021    CR 1.19 (H) 05/14/2021    GLC 81 05/17/2021    GLC 86 05/14/2021     COAGS:   Lab Results   Component Value Date    PTT 26 04/25/2021    INR 1.18 (H) 04/25/2021     POC:   Lab Results   Component Value Date     (H) 04/26/2021    HCG Negative 09/22/2008     HEPATIC:   Lab Results   Component Value Date    ALBUMIN 2.0 (L) 05/17/2021    PROTTOTAL 5.7 (L) 05/17/2021    ALT <6 05/17/2021    AST 11 05/17/2021    ALKPHOS 136 05/17/2021    BILITOTAL 0.2 05/17/2021    BILIDIRECT .0@ 03/10/2005     OTHER:   Lab Results   Component Value Date    PH 7.30 (L) 04/25/2021    LACT 0.9 04/25/2021    A1C 5.6 04/08/2021    KAYKAY 9.0 05/17/2021    PHOS 2.8 04/28/2021    MAG 2.7 (H) 04/28/2021    LIPASE 56 10/23/2009    TSH 1.97 04/08/2021    CRP 17.0 (H) 05/17/2021    SED 16 06/12/2019       Anesthesia Plan    ASA Status:  4      Anesthesia Type: General.     - Airway: ETT   Induction: Intravenous.           Consents            Postoperative Care    Pain management: IV analgesics.   PONV prophylaxis: Ondansetron (or other 5HT-3)     Comments:                Davy Cosme MD
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
## 2021-05-18 NOTE — PLAN OF CARE
Alert and oriented x4. Denied any pain. Slept off and on in between cares.  Bilateral groin wound-vac with no issues overnight, continuous suction at 125 mmHg. SBA with a walker for ambulation to the bathroom, continent of bowel and bladder. Continue POC.       Patient's most recent vital signs are:     Vital signs:  BP: 120/85  Temp: 96.1  HR: 86  RR: 16  SpO2: 97 %     Patient does not have new respiratory symptoms.  Patient does not have new sore throat.  Patient does not have a fever greater than 99.5.
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
## 2021-05-19 ENCOUNTER — APPOINTMENT (OUTPATIENT)
Dept: OCCUPATIONAL THERAPY | Facility: SKILLED NURSING FACILITY | Age: 71
End: 2021-05-19
Payer: COMMERCIAL

## 2021-05-19 ENCOUNTER — APPOINTMENT (OUTPATIENT)
Dept: LAB | Facility: CLINIC | Age: 71
End: 2021-05-19
Attending: INTERNAL MEDICINE
Payer: COMMERCIAL

## 2021-05-19 PROCEDURE — 250N000011 HC RX IP 250 OP 636: Performed by: INTERNAL MEDICINE

## 2021-05-19 PROCEDURE — 120N000009 HC R&B SNF

## 2021-05-19 PROCEDURE — 250N000013 HC RX MED GY IP 250 OP 250 PS 637: Performed by: INTERNAL MEDICINE

## 2021-05-19 PROCEDURE — 97110 THERAPEUTIC EXERCISES: CPT | Mod: GO

## 2021-05-19 PROCEDURE — 258N000003 HC RX IP 258 OP 636

## 2021-05-19 PROCEDURE — G0463 HOSPITAL OUTPT CLINIC VISIT: HCPCS

## 2021-05-19 PROCEDURE — 250N000012 HC RX MED GY IP 250 OP 636 PS 637: Performed by: INTERNAL MEDICINE

## 2021-05-19 PROCEDURE — 97535 SELF CARE MNGMENT TRAINING: CPT | Mod: GO

## 2021-05-19 RX ORDER — SODIUM CHLORIDE 9 MG/ML
INJECTION, SOLUTION INTRAVENOUS
Status: COMPLETED
Start: 2021-05-19 | End: 2021-05-19

## 2021-05-19 RX ADMIN — POTASSIUM CHLORIDE 20 MEQ: 750 TABLET, EXTENDED RELEASE ORAL at 08:02

## 2021-05-19 RX ADMIN — HEPARIN, PORCINE (PF) 10 UNIT/ML INTRAVENOUS SYRINGE 10 ML: at 20:12

## 2021-05-19 RX ADMIN — CALCIUM CARBONATE 600 MG (1,500 MG)-VITAMIN D3 400 UNIT TABLET 1 TABLET: at 20:13

## 2021-05-19 RX ADMIN — CALCIUM CARBONATE 600 MG (1,500 MG)-VITAMIN D3 400 UNIT TABLET 1 TABLET: at 08:04

## 2021-05-19 RX ADMIN — Medication 2.5 MG: at 11:54

## 2021-05-19 RX ADMIN — ACETAMINOPHEN 975 MG: 325 TABLET, FILM COATED ORAL at 11:57

## 2021-05-19 RX ADMIN — PANTOPRAZOLE SODIUM 40 MG: 40 TABLET, DELAYED RELEASE ORAL at 06:52

## 2021-05-19 RX ADMIN — CEFAZOLIN SODIUM 2 G: 2 INJECTION, SOLUTION INTRAVENOUS at 07:58

## 2021-05-19 RX ADMIN — ATORVASTATIN CALCIUM 80 MG: 40 TABLET, FILM COATED ORAL at 08:02

## 2021-05-19 RX ADMIN — CEFAZOLIN SODIUM 2 G: 2 INJECTION, SOLUTION INTRAVENOUS at 20:11

## 2021-05-19 RX ADMIN — PREDNISONE 5 MG: 5 TABLET ORAL at 08:04

## 2021-05-19 RX ADMIN — SODIUM CHLORIDE, PRESERVATIVE FREE 10 ML: 5 INJECTION INTRAVENOUS at 08:51

## 2021-05-19 RX ADMIN — MYCOPHENOLIC ACID 360 MG: 360 TABLET, DELAYED RELEASE ORAL at 08:01

## 2021-05-19 RX ADMIN — ASPIRIN 81 MG CHEWABLE TABLET 81 MG: 81 TABLET CHEWABLE at 08:02

## 2021-05-19 RX ADMIN — MYCOPHENOLIC ACID 360 MG: 360 TABLET, DELAYED RELEASE ORAL at 20:12

## 2021-05-19 RX ADMIN — TICAGRELOR 90 MG: 90 TABLET ORAL at 08:04

## 2021-05-19 RX ADMIN — TICAGRELOR 90 MG: 90 TABLET ORAL at 20:13

## 2021-05-19 RX ADMIN — SODIUM CHLORIDE 500 ML: 9 INJECTION, SOLUTION INTRAVENOUS at 20:11

## 2021-05-19 RX ADMIN — FLUTICASONE PROPIONATE 1 SPRAY: 50 SPRAY, METERED NASAL at 08:04

## 2021-05-19 RX ADMIN — LISINOPRIL 5 MG: 5 TABLET ORAL at 08:06

## 2021-05-19 RX ADMIN — ISOSORBIDE MONONITRATE 60 MG: 60 TABLET, EXTENDED RELEASE ORAL at 08:02

## 2021-05-19 NOTE — PROGRESS NOTES
CLINICAL NUTRITION SERVICES - REASSESSMENT NOTE     Nutrition Prescription    RECOMMENDATIONS FOR MDs/PROVIDERS TO ORDER:  None today    Malnutrition Status:    Non-severe malnutrition in the context of acute on chronic illness    Recommendations already ordered by Registered Dietitian (RD):  Continue Magic Cup at HS snack    Future/Additional Recommendations:  Adjust supplements pending pt preference      EVALUATION OF THE PROGRESS TOWARD GOALS   Diet: Regular, Magic Cup (orange/berry) at HS snack  Intake: 100% of meals per flowsheet. On average, pt is ordering 2050 kcal and 80 g protein daily per [employer]. This is likely meeting 100% minimum energy and protein needs.     NEW FINDINGS   - Bilateral groin surgical wound is healing per WOC note from 5/19.  - No updated wts within the last 2 weeks (despite twice weekly weight orders). Wt was previously stable over the last ~3 weeks. Overall, she has gained 11 lbs (12%) over the last 1 month.   05/05/21 : 48.3 kg (106 lb 8 oz)  05/04/21 : 48.5 kg (107 lb)   05/02/21 : 47.7 kg (105 lb 2.6 oz)  04/10/21 : 48 kg (105 lb 13.1 oz)  04/01/21 : 42.6 kg (94 lb)  03/16/21 : 43.3 kg (95 lb 6.4 oz)  09/08/20 : 42.4 kg (93 lb 8 oz)    MALNUTRITION  % Intake: No decreased intake noted  % Weight Loss: None noted  Subcutaneous Fat Loss: Facial region: mild  Muscle Loss: Temporal, Facial & jaw region, Thoracic region (clavicle, acromium bone, deltoid, trapezius, pectoral), Upper arm (bicep, tricep), Lower arm (forearm), Dorsal hand, Upper leg (quadricep, hamstring) and Posterior calf: mild-moderate   Fluid Accumulation/Edema: Trace  Malnutrition Diagnosis: Non-severe malnutrition in the context of acute on chronic illness    Previous Goals   Patient to consume % of nutritionally adequate meal trays TID, or the equivalent with supplements/snacks.  Evaluation: Met    Previous Nutrition Diagnosis  Predicted inadequate protein-energy intake related to variable appetite as  evidenced by pt reliant on PO intakes to meet 100% of nutritional needs with potential for variation      Evaluation: No change    CURRENT NUTRITION DIAGNOSIS  Predicted inadequate protein-energy intake related to variable appetite as evidenced by pt reliant on PO intakes to meet 100% of nutritional needs with potential for variation        INTERVENTIONS  Implementation  Medical food supplement therapy - continue (alternate flavors)    Goals  Patient to consume % of nutritionally adequate meal trays TID, or the equivalent with supplements/snacks.    Monitoring/Evaluation  Progress toward goals will be monitored and evaluated per protocol.    Rosalina Rodriguez RD, LD  TCU/8A Pager (M-F): [phone]  Weekend Pager ([place]): [phone]

## 2021-05-19 NOTE — PLAN OF CARE
Discharge Planner Post-Acute Rehab OT:      Discharge Plan: home with spouse/daughter A as needed. HHOT     Precautions: fall, wound vac      Current Status:  ADLs:modified indep w/ adls/transfers/room mobility w/ 4WW to transport WOUND vac and provide stability     IADLs: spouse and daughter A at baseline but pt would like to be IND with simple meal prep and house keeping.      Vision/Cognition: appears intact. Will continue to assess functional cog.      Assessment: OT: pt modified indep w/ morning adl routine w/ use of 4WW for stability and to transport wound vac, toileting, transfers and bed mobilty. discussed status and home set up, no additional AE recommend at this time. pt declined home OT/PT but agrees to home nursing , pt plans to do HEP and walk to continue to improve w/ strength/activity saeid. Antic last day OT 5/21 w/ discharge to home 5/23 , OT to focus on bathing and activity saeid next session.      Other Barriers to Discharge (DME, Family Training, etc): pt has several stairs at home (see PT notes). Pt reports her home set up for toileting is okay without adaptation.  She was given a handout for bathroom AE and plans to purchase a shower chair (current one is too big for her tub) and a reacher via [employer] or [place].

## 2021-05-19 NOTE — PLAN OF CARE
Patient in good spirits during encounters. Denied any pain. Slept well in between cares. SBA with a walker for ambulation to the bathroom. Reported no concerns with B/B, continent. No SOB. Sacral mepilex intact. Continue POC.       Patient's most recent vital signs are:     Vital signs:  BP: 150/87  Temp: 97  HR: 88  RR: 14  SpO2: 95 %     Patient does not have new respiratory symptoms.  Patient does not have new sore throat.  Patient does not have a fever greater than 99.5.

## 2021-05-19 NOTE — PLAN OF CARE
VS: BP (!) 148/79 (BP Location: Left arm)   Pulse 87   Temp 98.4  F (36.9  C) (Oral)   Resp 18   Wt 48.3 kg (106 lb 8 oz)   SpO2 97%   BMI 19.48 kg/m     O2: RA    Output: Voids without difficulty   Last BM: 5/19   Activity: Mod I in the room,     Skin: X   Wound vac sites x 2, running without difficulty    Pain: Denies   Medicated with oxycodone x 1 and tylenol x 1 prior to wound vac change   CMS: Intact    Dressing: CDI, sacral mepilex to be changed 5/20   Diet: Regular    LDA: PICC RA, heparin locked with blood return noted on both lumens    Equipment: IV pole, walker, wound vac    Plan: Continue plan of care    Additional Info: Pt is A&O, no acute concerns or complaints this shift. Call light within reach.            Patient's most recent vital signs are:     Vital signs:  BP: 148/79  Temp: 98.4  HR: 87  RR: 18  SpO2: 97 %     Patient does not have new respiratory symptoms.  Patient does not have new sore throat.  Patient does not have a fever greater than 99.5.

## 2021-05-19 NOTE — PLAN OF CARE
Alert and oriented x 4. C/o headache around 1600 gave tylenol 975mg and it was not effective around 2000 gave her oxycodone 5mg. Wound vac dressing was intact at 125mmhg. Voided good and had small BM. Ambulated to the bathroom using a walker with SBA x 2. PICC line was intact and patent, gave IV antibiotic x 1. Coccyx mepilex was CDI and changed on 5/17.       Patient's most recent vital signs are:     Vital signs:  BP: 150/87  Temp: 97  HR: 88  RR: 14  SpO2: 95 %     Patient doesn't  have new respiratory symptoms.  Patient doesn't have new sore throat.  Patient doesn't have a fever greater than 99.5.

## 2021-05-19 NOTE — PROGRESS NOTES
"[facility] Nurse Inpatient Wound Assessment   Reason for consultation: Evaluate and treat Bilateral groin and buttocks wounds    Assessment  Bilateral groin wounds due to Surgical Wound  Status: healing    Treatment Plan  Bilateral groin wound: Negative Pressure Wound Therapy (NPWT) to be changed by WO RN every Mon/Wed/Fri with small  black foam. Staff RN to assess pump settings set to -125 and dressing integrity every shift. Remove foam dressing and replace with BID normal saline moist gauze dressing if:  -a dressing failure which cannot be repaired within 2 hours  -patient is discharging to home without a home pump  -patient is discharging to a facility outside the local area  -if a dressing is a \"Silver Foam\", remove before Radiation Therapy or MRI    Buttocks prevention plan:  Cleanse the area with NS and pat dry.  Apply No sting film barrier to periwound skin.  Cover wound with Mepilex. Sacral Mepilex (#[id number])  Change dressing Q 3 days.  Turn and reposition Q 2hrs.  Ensure pt has Luis-cushion while sitting up in the chair.  FYI- If pt has constant incontinent loose stools needing dressing changes Q shift please discontinue the Mepilex dressing and apply criticaid barrier paste BID and PRN.      Orders Reviewed  Recommended provider order: None, at this time  [facility] Nurse follow-up plan:Monday/WednesdayFriday  Nursing to notify the Provider(s) and re-consult the [facility] Nurse if wound(s) deteriorates or new skin concern.    Patient History  According to provider note(s):    71 yo F s/p EVAR AUI and left to right femoral to femoral bypass graft for 5.9 cm AAA. Initial post-op course was complicated by inferior STEMI requiring emergent cardiac catheterization and PCI to RCA. Patient presenting on 4/23/2021 to ED with subjective fevers and chills and erythema and drainage from right groin wound.      4/24/2021 - Incision and drainage of right groin wound; packing of the wound with Adaptic, moistened Kerlix gauze, ABD " pad     4/25/2021 - Explantation of femoral to femoral bypass graft; femoral to femoral bypass graft using cadaveric homograft (tunneled retropubic); bilateral sartorius muscle flaps; placement of negative pressure wound vac therapy.     4/26/2021 - Cardiac catheterization for post-op EKG changes; RCA stent patent; diffuse vasospasm of LAD, LCx.  Returned to OR for bilateral groin washout and placement of Veraflo wound vac system.    Objective Data  Containment of urine/stool: Continent of bladder and Continent of bowel    Active Diet Order  Orders Placed This Encounter      Regular Diet Adult    Output:   I/O last 3 completed shifts:  In: 840 [P.O.:840]  Out: -     Risk Assessment:   Sensory Perception: 4-->no impairment  Moisture: 4-->rarely moist  Activity: 3-->walks occasionally  Mobility: 3-->slightly limited  Nutrition: 3-->adequate  Friction and Shear: 3-->no apparent problem  Juan Luis Score: 20                          Labs:   Recent Labs   Lab 05/17/21  0647   ALBUMIN 2.0*   HGB 9.5*   WBC 5.1   CRP 17.0*       Physical Exam  Areas of skin assessed: focused buttocks and bilateral groin    Wound Location:  Bilateral groin     5/3 Right groin                                                  5/19 Right groin           5/3 Left groin                                                    5/19 Left groin    Date of last photo 5/19  Wound History: See above, surgical wounds per vascular surgery.      Vac placed using window paning around wound to protect skin.   5/14: Spoke with MD re: pain meds making pt feel too sleepy. MD adjusted dose.  Pt stated pain was well controlled and didn't feel as sleepy.      Right groin: 6 x 8.1 x 0.9 cm (at deepest area)   Tunneling up to, 2 cm at 3 o'clock   90% clean minimally granulating base, 10% scattered slough  Left groin 2 x 5.4 x 1.2 cm  Tunneling NA  Clean minimally granulating base    Interventions  Visual inspection and assessment completed   Wound Care Rationale Promote  moist wound healing without tissue dehydration , Gently fill dead space to prevent abscess formation  and Provide protection   Wound Care: done per plan of care bilateral groin: cleanse with wound cleanser and pat dry. Paint keri wound skin with no sting. Window pane wounds with VAC drape. Spiral cute sponge and place into wound. Cover with VAC drape and ensure suction is set to -125 mm/Hg. Ensure you have a Y connecter so both track pads are to 1 machine.   Supplies: floor stock, discussed with RN and discussed with patient  Current off-loading measures: Pillows  Current support surface: Standard  Atmos Air mattress  Education provided to: importance of repositioning, plan of care, wound progress and Infection prevention   Discussed plan of care with Patient and Nurse    Melody Handy RN CWOCN

## 2021-05-20 ENCOUNTER — HOME INFUSION (PRE-WILLOW HOME INFUSION) (OUTPATIENT)
Dept: PHARMACY | Facility: CLINIC | Age: 71
End: 2021-05-20

## 2021-05-20 ENCOUNTER — APPOINTMENT (OUTPATIENT)
Dept: PHYSICAL THERAPY | Facility: SKILLED NURSING FACILITY | Age: 71
End: 2021-05-20
Payer: COMMERCIAL

## 2021-05-20 PROCEDURE — 120N000009 HC R&B SNF

## 2021-05-20 PROCEDURE — 250N000013 HC RX MED GY IP 250 OP 250 PS 637: Performed by: INTERNAL MEDICINE

## 2021-05-20 PROCEDURE — 97110 THERAPEUTIC EXERCISES: CPT | Mod: GP

## 2021-05-20 PROCEDURE — 250N000011 HC RX IP 250 OP 636: Performed by: INTERNAL MEDICINE

## 2021-05-20 PROCEDURE — 250N000012 HC RX MED GY IP 250 OP 636 PS 637: Performed by: INTERNAL MEDICINE

## 2021-05-20 PROCEDURE — 97116 GAIT TRAINING THERAPY: CPT | Mod: GP

## 2021-05-20 RX ADMIN — CEFAZOLIN SODIUM 2 G: 2 INJECTION, SOLUTION INTRAVENOUS at 20:55

## 2021-05-20 RX ADMIN — PANTOPRAZOLE SODIUM 40 MG: 40 TABLET, DELAYED RELEASE ORAL at 06:02

## 2021-05-20 RX ADMIN — PREDNISONE 5 MG: 5 TABLET ORAL at 08:36

## 2021-05-20 RX ADMIN — FLUTICASONE PROPIONATE 1 SPRAY: 50 SPRAY, METERED NASAL at 08:34

## 2021-05-20 RX ADMIN — TICAGRELOR 90 MG: 90 TABLET ORAL at 08:35

## 2021-05-20 RX ADMIN — POTASSIUM CHLORIDE 20 MEQ: 750 TABLET, EXTENDED RELEASE ORAL at 08:35

## 2021-05-20 RX ADMIN — HEPARIN, PORCINE (PF) 10 UNIT/ML INTRAVENOUS SYRINGE 10 ML: at 20:58

## 2021-05-20 RX ADMIN — CALCIUM CARBONATE 600 MG (1,500 MG)-VITAMIN D3 400 UNIT TABLET 1 TABLET: at 08:36

## 2021-05-20 RX ADMIN — ISOSORBIDE MONONITRATE 60 MG: 60 TABLET, EXTENDED RELEASE ORAL at 08:35

## 2021-05-20 RX ADMIN — ATORVASTATIN CALCIUM 80 MG: 40 TABLET, FILM COATED ORAL at 08:35

## 2021-05-20 RX ADMIN — MYCOPHENOLIC ACID 360 MG: 360 TABLET, DELAYED RELEASE ORAL at 21:32

## 2021-05-20 RX ADMIN — MYCOPHENOLIC ACID 360 MG: 360 TABLET, DELAYED RELEASE ORAL at 08:37

## 2021-05-20 RX ADMIN — TICAGRELOR 90 MG: 90 TABLET ORAL at 21:32

## 2021-05-20 RX ADMIN — CEFAZOLIN SODIUM 2 G: 2 INJECTION, SOLUTION INTRAVENOUS at 08:41

## 2021-05-20 RX ADMIN — CALCIUM CARBONATE 600 MG (1,500 MG)-VITAMIN D3 400 UNIT TABLET 1 TABLET: at 21:32

## 2021-05-20 RX ADMIN — LISINOPRIL 5 MG: 5 TABLET ORAL at 08:36

## 2021-05-20 RX ADMIN — ASPIRIN 81 MG CHEWABLE TABLET 81 MG: 81 TABLET CHEWABLE at 08:36

## 2021-05-20 NOTE — PROGRESS NOTES
Vascular Staff    Appreciate excellent care at rehab. Pending discharge noted.    Please note that Vascular Surgery - my office - will be managing antibiotics for this patient's groin infection as we will be following her EVAR and perfusion for life. If patient is able to be discharged with PICC line and iv antibiotics for home without out-of-pocket expense, then continue until first week of June. If IV not covered at home, then patient will need to be discharged on Bactrim PO which staph was sensitive to. Our team will be by on Friday during VAC change to check wound and document photos in chart. We will arrange follow up once wounds viewewd tomorrow.    Abena Frerara MD, DFSVS, RPVI  Director, [facility] Vascular Services  Chief, Vascular and Endovascular Surgery  [facility]  [email or web address]  Pager: Herminia Baker

## 2021-05-20 NOTE — PLAN OF CARE
VS: /81 (BP Location: Left arm)   Pulse 87   Temp 97.6  F (36.4  C) (Oral)   Resp 16   Wt 40.8 kg (89 lb 14.4 oz)   SpO2 96%   BMI 16.44 kg/m    Patient is A&O x 4, able to make needs known.   O2: 96% at RA   Output: Adequate   Last BM: 5/20   Activity: MOD I in room with a walker   Skin: Abdominal site covered  and connected to wound vac   Pain: Denies pain   CMS: intact   Dressing: CD&I   Diet: Regular diet   LDA: PICC in Rt ac patent with antibiotics   Equipment: walker   Plan: Continue with POC    Patient's most recent vital signs are:     Vital signs:  BP: 126/81  Temp: 97.6  HR: 87  RR: 16  SpO2: 96 %     Patient does not have new respiratory symptoms.  Patient does not have new sore throat.  Patient does not have a fever greater than 99.5.         Additional Info:

## 2021-05-20 NOTE — PLAN OF CARE
No acute issues overnight. Has no c/o's pain, discomfort, CP and SOB. No other c/o's. SBA>Mod I to BR with walker, able to manage vac indep. Bilateral groin wound vac intact/patent @ 125mmHg continuous suction. Continues IV abx via DL open-ended picc RUE.        Patient's most recent vital signs are:     Vital signs:  BP: 134/73  Temp: 98  HR: 85  RR: 18  SpO2: 95 %     Patient does not have new respiratory symptoms.  Patient does not have new sore throat.  Patient does not have a fever greater than 99.5.
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
## 2021-05-21 ENCOUNTER — APPOINTMENT (OUTPATIENT)
Dept: EDUCATION SERVICES | Facility: CLINIC | Age: 71
End: 2021-05-21
Attending: INTERNAL MEDICINE
Payer: COMMERCIAL

## 2021-05-21 ENCOUNTER — APPOINTMENT (OUTPATIENT)
Dept: LAB | Facility: CLINIC | Age: 71
End: 2021-05-21
Attending: INTERNAL MEDICINE
Payer: COMMERCIAL

## 2021-05-21 ENCOUNTER — APPOINTMENT (OUTPATIENT)
Dept: OCCUPATIONAL THERAPY | Facility: SKILLED NURSING FACILITY | Age: 71
End: 2021-05-21
Payer: COMMERCIAL

## 2021-05-21 PROCEDURE — 250N000012 HC RX MED GY IP 250 OP 636 PS 637: Performed by: INTERNAL MEDICINE

## 2021-05-21 PROCEDURE — 99232 SBSQ HOSP IP/OBS MODERATE 35: CPT | Mod: 24 | Performed by: NURSE PRACTITIONER

## 2021-05-21 PROCEDURE — 97535 SELF CARE MNGMENT TRAINING: CPT | Mod: GO

## 2021-05-21 PROCEDURE — 250N000011 HC RX IP 250 OP 636: Performed by: INTERNAL MEDICINE

## 2021-05-21 PROCEDURE — 120N000009 HC R&B SNF

## 2021-05-21 PROCEDURE — G0463 HOSPITAL OUTPT CLINIC VISIT: HCPCS

## 2021-05-21 PROCEDURE — 258N000003 HC RX IP 258 OP 636

## 2021-05-21 PROCEDURE — 97110 THERAPEUTIC EXERCISES: CPT | Mod: GO

## 2021-05-21 PROCEDURE — 250N000013 HC RX MED GY IP 250 OP 250 PS 637: Performed by: INTERNAL MEDICINE

## 2021-05-21 RX ORDER — SODIUM CHLORIDE 9 MG/ML
INJECTION, SOLUTION INTRAVENOUS
Status: COMPLETED
Start: 2021-05-21 | End: 2021-05-21

## 2021-05-21 RX ADMIN — CEFAZOLIN SODIUM 2 G: 2 INJECTION, SOLUTION INTRAVENOUS at 08:56

## 2021-05-21 RX ADMIN — Medication 2.5 MG: at 16:28

## 2021-05-21 RX ADMIN — SODIUM CHLORIDE 500 ML: 9 INJECTION, SOLUTION INTRAVENOUS at 08:57

## 2021-05-21 RX ADMIN — PANTOPRAZOLE SODIUM 40 MG: 40 TABLET, DELAYED RELEASE ORAL at 06:57

## 2021-05-21 RX ADMIN — Medication 2.5 MG: at 10:12

## 2021-05-21 RX ADMIN — POTASSIUM CHLORIDE 20 MEQ: 750 TABLET, EXTENDED RELEASE ORAL at 08:58

## 2021-05-21 RX ADMIN — PREDNISONE 5 MG: 5 TABLET ORAL at 08:58

## 2021-05-21 RX ADMIN — ACETAMINOPHEN 975 MG: 325 TABLET, FILM COATED ORAL at 13:12

## 2021-05-21 RX ADMIN — LISINOPRIL 5 MG: 5 TABLET ORAL at 09:00

## 2021-05-21 RX ADMIN — MYCOPHENOLIC ACID 360 MG: 360 TABLET, DELAYED RELEASE ORAL at 08:58

## 2021-05-21 RX ADMIN — ISOSORBIDE MONONITRATE 60 MG: 60 TABLET, EXTENDED RELEASE ORAL at 08:58

## 2021-05-21 RX ADMIN — ATORVASTATIN CALCIUM 80 MG: 40 TABLET, FILM COATED ORAL at 08:58

## 2021-05-21 RX ADMIN — CEFAZOLIN SODIUM 2 G: 2 INJECTION, SOLUTION INTRAVENOUS at 20:35

## 2021-05-21 RX ADMIN — HEPARIN, PORCINE (PF) 10 UNIT/ML INTRAVENOUS SYRINGE 10 ML: at 21:47

## 2021-05-21 RX ADMIN — TICAGRELOR 90 MG: 90 TABLET ORAL at 20:36

## 2021-05-21 RX ADMIN — MYCOPHENOLIC ACID 360 MG: 360 TABLET, DELAYED RELEASE ORAL at 20:35

## 2021-05-21 RX ADMIN — ASPIRIN 81 MG CHEWABLE TABLET 81 MG: 81 TABLET CHEWABLE at 08:58

## 2021-05-21 RX ADMIN — CALCIUM CARBONATE 600 MG (1,500 MG)-VITAMIN D3 400 UNIT TABLET 1 TABLET: at 20:36

## 2021-05-21 RX ADMIN — TICAGRELOR 90 MG: 90 TABLET ORAL at 08:58

## 2021-05-21 RX ADMIN — FLUTICASONE PROPIONATE 1 SPRAY: 50 SPRAY, METERED NASAL at 09:00

## 2021-05-21 RX ADMIN — MYCOPHENOLIC ACID 360 MG: 360 TABLET, DELAYED RELEASE ORAL at 08:59

## 2021-05-21 RX ADMIN — CALCIUM CARBONATE 600 MG (1,500 MG)-VITAMIN D3 400 UNIT TABLET 1 TABLET: at 08:58

## 2021-05-21 NOTE — PROGRESS NOTES
"[facility] Nurse Inpatient Wound Assessment   Reason for consultation: Evaluate and treat Bilateral groin and buttocks wounds    Assessment  Bilateral groin wounds due to Surgical Wound  Status: healing    Treatment Plan  Bilateral groin wound: Negative Pressure Wound Therapy (NPWT) to be changed by WO RN every Mon/Wed/Fri with small  black foam. Staff RN to assess pump settings set to -125 and dressing integrity every shift. Remove foam dressing and replace with BID normal saline moist gauze dressing if:  -a dressing failure which cannot be repaired within 2 hours  -patient is discharging to home without a home pump  -patient is discharging to a facility outside the local area  -if a dressing is a \"Silver Foam\", remove before Radiation Therapy or MRI    Buttocks prevention plan:  Cleanse the area with NS and pat dry.  Apply No sting film barrier to periwound skin.  Cover wound with Mepilex. Sacral Mepilex (#[id number])  Change dressing Q 3 days.  Turn and reposition Q 2hrs.  Ensure pt has Luis-cushion while sitting up in the chair.  FYI- If pt has constant incontinent loose stools needing dressing changes Q shift please discontinue the Mepilex dressing and apply criticaid barrier paste BID and PRN.      Orders Reviewed  Recommended provider order: None, at this time  [facility] Nurse follow-up plan:Monday/WednesdayFriday  Nursing to notify the Provider(s) and re-consult the [facility] Nurse if wound(s) deteriorates or new skin concern.    Patient History  According to provider note(s):    69 yo F s/p EVAR AUI and left to right femoral to femoral bypass graft for 5.9 cm AAA. Initial post-op course was complicated by inferior STEMI requiring emergent cardiac catheterization and PCI to RCA. Patient presenting on 4/23/2021 to ED with subjective fevers and chills and erythema and drainage from right groin wound.      4/24/2021 - Incision and drainage of right groin wound; packing of the wound with Adaptic, moistened Kerlix gauze, ABD " pad     4/25/2021 - Explantation of femoral to femoral bypass graft; femoral to femoral bypass graft using cadaveric homograft (tunneled retropubic); bilateral sartorius muscle flaps; placement of negative pressure wound vac therapy.     4/26/2021 - Cardiac catheterization for post-op EKG changes; RCA stent patent; diffuse vasospasm of LAD, LCx.  Returned to OR for bilateral groin washout and placement of Veraflo wound vac system.    Objective Data  Containment of urine/stool: Continent of bladder and Continent of bowel    Active Diet Order  Orders Placed This Encounter      Regular Diet Adult    Output:   I/O last 3 completed shifts:  In: 120 [P.O.:120]  Out: -     Risk Assessment:   Sensory Perception: 4-->no impairment  Moisture: 4-->rarely moist  Activity: 3-->walks occasionally  Mobility: 3-->slightly limited  Nutrition: 3-->adequate  Friction and Shear: 2-->potential problem  Juan Luis Score: 19                          Labs:   Recent Labs   Lab 05/17/21  0647   ALBUMIN 2.0*   HGB 9.5*   WBC 5.1   CRP 17.0*       Physical Exam  Areas of skin assessed: focused buttocks and bilateral groin    Wound Location:  Bilateral groin      5/19 Right groin                                                 5/21 Right groin           5/19 Left groin                                                   5/21 Left groin    Date of last photo 5/19  Wound History: See above, surgical wounds per vascular surgery.      Vac placed using window paning around wound to protect skin.     Right groin: 6 x 7 x 0.5 cm (at deepest area)   Tunneling up to, 2 cm at 3 o'clock   90% clean minimally granulating base, 10% scattered slough  Left groin 2 x 5 x 1 cm  Tunneling NA  Clean granulating base    Interventions  Visual inspection and assessment completed   Wound Care Rationale Promote moist wound healing without tissue dehydration , Gently fill dead space to prevent abscess formation  and Provide protection   Wound Care: done per plan of care  bilateral groin: cleanse with wound cleanser and pat dry. Paint keri wound skin with no sting. Window pane wounds with VAC drape. Spiral cute sponge and place into wound. Cover with VAC drape and ensure suction is set to -125 mm/Hg. Ensure you have a Y connecter so both track pads are to 1 machine.   Supplies: floor stock, discussed with RN and discussed with patient  Current off-loading measures: Pillows  Current support surface: Standard  Atmos Air mattress  Education provided to: importance of repositioning, plan of care, wound progress and Infection prevention   Discussed plan of care with Patient and Nurse    Dolores Francis RN CWOCN

## 2021-05-21 NOTE — PLAN OF CARE
VS: /75 (BP Location: Left arm)   Pulse 94   Temp 97.6  F (36.4  C) (Oral)   Resp 16   Wt 40.8 kg (89 lb 14.4 oz)   SpO2 95%   BMI 16.44 kg/m    Patient is A&O x 4, able to make needs known. Denies SOB, nausea or CP   O2: 95% at RA   Output: Adequate   Last BM: 5/20   Activity: MOD I in room   Skin: Abdominal dressings are CD&I. Wound vac  is patent with serosanguinous    Pain: Denies pain   CMS: intact   Dressing: CD&I   Diet: Regular diet   LDA: PICC patent, Hep lock with Hep 5 ml each   Equipment: Walker, wound vac   Plan: Continue to monitor   Additional Info: Pt states had  her two shots of covid and she had just had a test  few days ago the was negative. Test not done           Patient's most recent vital signs are:     Vital signs:  BP: 109/75  Temp: 97.6  HR: 94  RR: 16  SpO2: 95 %     Patient does not have new respiratory symptoms.  Patient does not have new sore throat.  Patient does not have a fever greater than 99.5.

## 2021-05-21 NOTE — PROGRESS NOTES
[facility] Notice of Medicare Non-Coverage Note:     Met with patient to Introduce self and role.      Discussed Notice of Medicare Non-Coverage and last day of coverage as required for all patients with Medicare coverage during Skilled Nursing Facility (SNF) stays. Last coverage day is 5/22/21.    Patient is very excited about going home.     Opportunity provided for questions and education provided regarding potential financial impact to patient. Copy of NOMNC form provided to patient.     Patient verbalizes understanding of discussion.     Carmen Shahid RN, BSN  [facility] [employer] and

## 2021-05-21 NOTE — PLAN OF CARE
Patient has no new concerns tonight.  No complaints of pain, SOB. Sleeping well during rounds. Mod I with a walker for ambulation to the bathroom. Reported no concerns with B/B, continent. No SOB. Sacral mepilex intact. Continue POC.       Patient's most recent vital signs are:     Vital signs:  BP: 109/75  Temp: 97.6  HR: 94  RR: 16  SpO2: 95 %     Patient does not have new respiratory symptoms.  Patient does not have new sore throat.  Patient does not have a fever greater than 99.5.

## 2021-05-21 NOTE — PROGRESS NOTES
VASCULAR SURGERY PROGRESS NOTE    Subjective:  Reports she will be discharged to home this weekend, looking forward to discharge.  Tolerating wound vac dressing changes and improved pain.  Reports she continues to eat well.    Objective:No intake or output data in the 24 hours ending 05/21/21 1152  Labs:  ROUTINE IP LABS (Last four results)  BMP  Recent Labs   Lab 05/17/21  0647      POTASSIUM 3.9   CHLORIDE 108   KAYKAY 9.0   CO2 25   BUN 20   CR 1.20*   GLC 81     CBC  Recent Labs   Lab 05/17/21  0647   WBC 5.1   RBC 3.29*   HGB 9.5*   HCT 31.3*   MCV 95   MCH 28.9   MCHC 30.4*   RDW 19.8*        INRNo lab results found in last 7 days.  PHYSICAL EXAM:  /87 (BP Location: Right leg)   Pulse 83   Temp 97.6  F (36.4  C) (Oral)   Resp 18   Wt 40.8 kg (89 lb 14.4 oz)   SpO2 96%   BMI 16.44 kg/m    General: The patient is alert and oriented. Appropriate. No acute distress  Psych: pleasant affect, answers questions appropriately  Skin: Color appropriate for race, warm, dry.  Respiratory: The patient does not require supplemental oxygen. Breathing unlabored  GI:  Abdomen soft, nontender to light palpation.  Extremities: Bilateral wound beds appear clean and pink with decrease in serous drainage.  Sutures removed, keri-wound area intact.          Left:         RIght:            ASSESSMENT:  69 yo F s/p EVAR AUI and left to right femoral to femoral bypass graft for 5.9 cm AAA. Initial post-op course was complicated by inferior STEMI requiring emergent cardiac catheterization and PCI to RCA. Patient presenting on 4/23/2021 to ED with subjective fevers and chills and erythema and drainage from right groin wound.      4/24/2021 - Incision and drainage of right groin wound; packing of the wound with Adaptic, moistened Kerlix gauze, ABD pad     4/25/2021 - Explantation of femoral to femoral bypass graft; femoral to femoral bypass graft using cadaveric homograft (tunneled retropubic); bilateral sartorius  muscle flaps; placement of negative pressure wound vac therapy.      4/26/2021 - Cardiac catheterization for post-op EKG changes; RCA stent patent; diffuse vasospasm of LAD, LCx.  Returned to OR for bilateral groin washout and placement of wound vac.    Now ready for discharge from TCU.      PLAN:  -Continue home wound vac dressing changes with home health  -Continue IV antibiotics as able, may transition to PO bactrim if issues with approval for IV home therapy. Vascular will continue to manage antibiotic plan as an outpatient  -Continue to promote optimal nutrition  -Continue Aspirin, Brillinta, and Atorvastatin  -Will arrange for outpatient follow up in clinic to monitor wound progress.    Jessica Bunn, DNP, APRN, AGNP-C  Division of Vascular Surgery   [facility]  Pager: [phone]

## 2021-05-21 NOTE — CONSULTS
Met with patient and daughter for PICC IV med education. Patient and daughter able to demonstrate understanding of PICC home cares, flushing with saline and heparin, administering IV push abx, and changing end cap if necessary.    Literature given: Handwashing and Skin Care,Instructions for IV Push Medicine, Caring for Your PICC at Home, Changing the End Cap, Flushing the Line with Heparin, Saline or Citrate

## 2021-05-21 NOTE — PLAN OF CARE
VS: VSS, denied chest pain.   O2: 96% on RA. No SOB reported    Output: Continent, up to the toilet    Last BM: 05/21/21   Activity: MOD I   Skin: Wound vac to bilateral groin, blancahble redness coccyx area    Pain: Bilateral groin area pain managed with Tylenol and oxycodone x 1.    CMS: Intact    Dressing: Sacral mepilex changed. Wound vac dressing changed by WOC   Diet: regular   LDA: PICC DL right arm, dressing CDI. IV antibiotics infused without any difficulty.     Equipment: Walker, Iv pole, wound vac   Plan: Continue with POC   Additional Info: Alert and oriented, can make needs known. Resting with call light in reach.            Patient's most recent vital signs are:     Vital signs:  BP: 117/87  Temp: 97.6  HR: 83  RR: 18  SpO2: 96 %     Patient does not have new respiratory symptoms.  Patient does not have new sore throat.  Patient does not have a fever greater than 99.5.

## 2021-05-21 NOTE — PROGRESS NOTES
Possible discharge Sunday, May 23rd to home pending medical clearance.  PLC education today on IV antibiotic use for home.  [facility] authorized wound vac for home use, and staff to contact Please have them contact Leonard Angelo over the weekend.  Her number is [phone] when home wound vac is to be placed prior to discharge.  [facility] will be supplying home antibiotic and [facility] homecare will be assisting with home care needs for patient.  Continue plan of care and discharge planning pending medical clearance.

## 2021-05-21 NOTE — PROGRESS NOTES
[facility])  NURSE LIAISON NOTE  Met with pt at bedside. Pt is expected to DC Sunday. Educated on [facility] role in Pt DC to home. Daughter Aminata, states she is comfortable doing home infusion and agrees to do the doses on Sunday at 8p and Monday at 8p. Pt's spouse did not come today as planned.  IVP  Mock Teach, reviewed with pt and daughter. PLC came as this Liaison was meeting with pt and daughter.  Daughter  agrees to contact [facility] for any questions, clarification or further education needs.  Educated to keep dressing CDI, and to cover dressing during bathing. Encouraged to call [facility] for any questions, clarifications or problems.  Educated on Deliveries, importance of refrigerating medications.  They were engaged during this RN Visit in hospital room.  They understands to expect phone calls from [facility], to discuss delivery and also SNV time for Monday 5.25.  Provided RN/RPH on call 24/7, phone number.  They verbalizes understanding of above.  DC: Sunday after am dose of Ancef  DC Therapies:  SN PT OT wound vac changes [facility]  DRUG/Delivery Mode/Dosing:Ancef q 12, 8/8  Delivery/Supplies: to pt home in afternoon  LINES/TUBES:  [facility]  AGENCY: [facility]  SNV:  Monday 5.25  NOTE: Daughter agrees to do Sunday belinda/Mon AM dose of Ancef. Pt states spouse will be home for SNV visit on Monday to do ancef education. Pt agrees to [employer] for approx $34 per day. Pt is Home bound.    Rosanna Camp, [facility]-Nurse Liaison  [email or web address]  My Cell:  [phone]  M-F  [facility] OFFICE  24/hrs  [phone]

## 2021-05-21 NOTE — PLAN OF CARE
Occupational Therapy Discharge Summary    Reason for therapy discharge:    All goals and outcomes met, no further needs identified.  Pt planning discharge to home on Sunday.    Progress towards therapy goal(s). See goals on Care Plan in [facility] electronic health record for goal details.  Goals met    Therapy recommendation(s):    No further therapy is recommended. Pt has support of family at home and declines home OT.  She ordered a reacher.  She has tried a tub chair and has awareness of what to purchase when she resumes showering.  Pt has an UE HEP and no further training is indicated.  Pt is pleasant, safe, goal directed and OT goals have been met.

## 2021-05-22 ENCOUNTER — APPOINTMENT (OUTPATIENT)
Dept: PHYSICAL THERAPY | Facility: SKILLED NURSING FACILITY | Age: 71
End: 2021-05-22
Payer: COMMERCIAL

## 2021-05-22 ENCOUNTER — HOME INFUSION (PRE-WILLOW HOME INFUSION) (OUTPATIENT)
Dept: PHARMACY | Facility: CLINIC | Age: 71
End: 2021-05-22

## 2021-05-22 LAB
ANION GAP SERPL CALCULATED.3IONS-SCNC: 6 MMOL/L (ref 3–14)
BUN SERPL-MCNC: 24 MG/DL (ref 7–30)
CALCIUM SERPL-MCNC: 9.1 MG/DL (ref 8.5–10.1)
CHLORIDE SERPL-SCNC: 106 MMOL/L (ref 94–109)
CO2 SERPL-SCNC: 22 MMOL/L (ref 20–32)
CREAT SERPL-MCNC: 1.09 MG/DL (ref 0.52–1.04)
GFR SERPL CREATININE-BSD FRML MDRD: 52 ML/MIN/{1.73_M2}
GLUCOSE SERPL-MCNC: 91 MG/DL (ref 70–99)
POTASSIUM SERPL-SCNC: 4.6 MMOL/L (ref 3.4–5.3)
SODIUM SERPL-SCNC: 134 MMOL/L (ref 133–144)

## 2021-05-22 PROCEDURE — 97530 THERAPEUTIC ACTIVITIES: CPT | Mod: GP

## 2021-05-22 PROCEDURE — 97116 GAIT TRAINING THERAPY: CPT | Mod: GP

## 2021-05-22 PROCEDURE — 250N000011 HC RX IP 250 OP 636: Performed by: INTERNAL MEDICINE

## 2021-05-22 PROCEDURE — 250N000012 HC RX MED GY IP 250 OP 636 PS 637: Performed by: INTERNAL MEDICINE

## 2021-05-22 PROCEDURE — 250N000013 HC RX MED GY IP 250 OP 250 PS 637: Performed by: INTERNAL MEDICINE

## 2021-05-22 PROCEDURE — 258N000003 HC RX IP 258 OP 636

## 2021-05-22 PROCEDURE — 120N000009 HC R&B SNF

## 2021-05-22 PROCEDURE — 80048 BASIC METABOLIC PNL TOTAL CA: CPT | Performed by: INTERNAL MEDICINE

## 2021-05-22 PROCEDURE — 36592 COLLECT BLOOD FROM PICC: CPT | Performed by: INTERNAL MEDICINE

## 2021-05-22 RX ORDER — LISINOPRIL 5 MG/1
5 TABLET ORAL DAILY
Qty: 60 TABLET | Refills: 4 | Status: ON HOLD | OUTPATIENT
Start: 2021-05-22 | End: 2021-06-24

## 2021-05-22 RX ORDER — SODIUM CHLORIDE 9 MG/ML
INJECTION, SOLUTION INTRAVENOUS
Status: COMPLETED
Start: 2021-05-22 | End: 2021-05-22

## 2021-05-22 RX ORDER — ASPIRIN 81 MG/1
81 TABLET, CHEWABLE ORAL DAILY
Qty: 90 TABLET | Refills: 3 | Status: SHIPPED | OUTPATIENT
Start: 2021-05-22 | End: 2022-06-01

## 2021-05-22 RX ORDER — OXYCODONE HYDROCHLORIDE 5 MG/1
2.5 TABLET ORAL EVERY 6 HOURS PRN
Qty: 20 TABLET | Refills: 0 | Status: ON HOLD | OUTPATIENT
Start: 2021-05-22 | End: 2021-06-24

## 2021-05-22 RX ORDER — PREDNISONE 5 MG/1
5 TABLET ORAL DAILY
Qty: 90 TABLET | Refills: 3 | Status: SHIPPED | OUTPATIENT
Start: 2021-05-22 | End: 2022-06-01

## 2021-05-22 RX ORDER — POTASSIUM CHLORIDE 1500 MG/1
20 TABLET, EXTENDED RELEASE ORAL DAILY
Qty: 30 TABLET | Refills: 1 | Status: SHIPPED | OUTPATIENT
Start: 2021-05-23 | End: 2021-05-23

## 2021-05-22 RX ORDER — FLUTICASONE PROPIONATE 50 MCG
1 SPRAY, SUSPENSION (ML) NASAL DAILY
Qty: 18.2 ML | Refills: 3 | Status: SHIPPED | OUTPATIENT
Start: 2021-05-23 | End: 2023-01-01

## 2021-05-22 RX ORDER — ATORVASTATIN CALCIUM 80 MG/1
80 TABLET, FILM COATED ORAL DAILY
Qty: 60 TABLET | Refills: 4 | Status: SHIPPED | OUTPATIENT
Start: 2021-05-22 | End: 2022-06-01

## 2021-05-22 RX ORDER — CEFAZOLIN SODIUM 2 G/100ML
2 INJECTION, SOLUTION INTRAVENOUS EVERY 12 HOURS
Qty: 2600 ML | Refills: 0 | Status: SHIPPED | OUTPATIENT
Start: 2021-05-23 | End: 2021-05-22

## 2021-05-22 RX ORDER — ACETAMINOPHEN 325 MG/1
650 TABLET ORAL EVERY 4 HOURS PRN
Qty: 100 TABLET | Refills: 3 | Status: SHIPPED | OUTPATIENT
Start: 2021-05-22

## 2021-05-22 RX ORDER — ISOSORBIDE MONONITRATE 60 MG/1
60 TABLET, EXTENDED RELEASE ORAL DAILY
Qty: 60 TABLET | Refills: 4 | Status: SHIPPED | OUTPATIENT
Start: 2021-05-22 | End: 2022-04-06

## 2021-05-22 RX ORDER — HEPARIN SODIUM,PORCINE 10 UNIT/ML
5 VIAL (ML) INTRAVENOUS 2 TIMES DAILY
Qty: 100 ML | Refills: 1 | Status: ON HOLD | OUTPATIENT
Start: 2021-05-22 | End: 2021-06-24

## 2021-05-22 RX ORDER — CEFAZOLIN SODIUM 2 G/100ML
2 INJECTION, SOLUTION INTRAVENOUS EVERY 12 HOURS
Qty: 3200 ML | Refills: 0 | Status: SHIPPED | OUTPATIENT
Start: 2021-05-23 | End: 2021-06-08

## 2021-05-22 RX ORDER — PANTOPRAZOLE SODIUM 40 MG/1
40 TABLET, DELAYED RELEASE ORAL
Qty: 60 TABLET | Refills: 3 | Status: SHIPPED | OUTPATIENT
Start: 2021-05-23 | End: 2022-03-08

## 2021-05-22 RX ORDER — MYCOPHENOLIC ACID 360 MG/1
360 TABLET, DELAYED RELEASE ORAL 2 TIMES DAILY
Qty: 60 TABLET | Refills: 3 | Status: SHIPPED | OUTPATIENT
Start: 2021-05-22 | End: 2021-09-30

## 2021-05-22 RX ADMIN — TICAGRELOR 90 MG: 90 TABLET ORAL at 20:18

## 2021-05-22 RX ADMIN — PREDNISONE 5 MG: 5 TABLET ORAL at 09:25

## 2021-05-22 RX ADMIN — CALCIUM CARBONATE 600 MG (1,500 MG)-VITAMIN D3 400 UNIT TABLET 1 TABLET: at 09:24

## 2021-05-22 RX ADMIN — SODIUM CHLORIDE, PRESERVATIVE FREE 5 ML: 5 INJECTION INTRAVENOUS at 11:53

## 2021-05-22 RX ADMIN — ATORVASTATIN CALCIUM 80 MG: 40 TABLET, FILM COATED ORAL at 09:25

## 2021-05-22 RX ADMIN — PANTOPRAZOLE SODIUM 40 MG: 40 TABLET, DELAYED RELEASE ORAL at 06:33

## 2021-05-22 RX ADMIN — MYCOPHENOLIC ACID 360 MG: 360 TABLET, DELAYED RELEASE ORAL at 09:24

## 2021-05-22 RX ADMIN — ASPIRIN 81 MG CHEWABLE TABLET 81 MG: 81 TABLET CHEWABLE at 09:24

## 2021-05-22 RX ADMIN — MYCOPHENOLIC ACID 360 MG: 360 TABLET, DELAYED RELEASE ORAL at 20:18

## 2021-05-22 RX ADMIN — TICAGRELOR 90 MG: 90 TABLET ORAL at 09:24

## 2021-05-22 RX ADMIN — ISOSORBIDE MONONITRATE 60 MG: 60 TABLET, EXTENDED RELEASE ORAL at 09:24

## 2021-05-22 RX ADMIN — POTASSIUM CHLORIDE 20 MEQ: 750 TABLET, EXTENDED RELEASE ORAL at 09:24

## 2021-05-22 RX ADMIN — HEPARIN, PORCINE (PF) 10 UNIT/ML INTRAVENOUS SYRINGE 10 ML: at 21:21

## 2021-05-22 RX ADMIN — CEFAZOLIN SODIUM 2 G: 2 INJECTION, SOLUTION INTRAVENOUS at 20:18

## 2021-05-22 RX ADMIN — CALCIUM CARBONATE 600 MG (1,500 MG)-VITAMIN D3 400 UNIT TABLET 1 TABLET: at 20:18

## 2021-05-22 RX ADMIN — FLUTICASONE PROPIONATE 1 SPRAY: 50 SPRAY, METERED NASAL at 09:25

## 2021-05-22 RX ADMIN — SODIUM CHLORIDE 500 ML: 9 INJECTION, SOLUTION INTRAVENOUS at 09:27

## 2021-05-22 RX ADMIN — CEFAZOLIN SODIUM 2 G: 2 INJECTION, SOLUTION INTRAVENOUS at 09:27

## 2021-05-22 RX ADMIN — SODIUM CHLORIDE, PRESERVATIVE FREE 5 ML: 5 INJECTION INTRAVENOUS at 10:31

## 2021-05-22 NOTE — PLAN OF CARE
VS: /79 (BP Location: Left arm)   Pulse 100   Temp 97.4  F (36.3  C) (Oral)   Resp 16   Wt 40.8 kg (89 lb 14.4 oz)   SpO2 99%   BMI 16.44 kg/m     O2: >90% on RA    Output: Continent using BR    Last BM: 5/21   Activity: MOD I    Skin: Intact x wounds on pelvis. Wound vac continuous 125 mmHg patent   Pain: Denies    CMS: Intact    Dressing: Wound vac CDI    Diet: Regular   LDA: Wound vac, DL PICC RUE    Equipment: Walker, wound vac, IV pole and pump   Plan: Continue W/ POC    Additional Info: Pt A&O x4. Denies chest pain and SOB. IV abx infused without any difficulties. Discharging tomorrow. Call light is in reach.            Patient's most recent vital signs are:     Vital signs:  BP: 100/79  Temp: 97.4  HR: 100  RR: 16  SpO2: 99 %     Patient does not have new respiratory symptoms.  Patient does not have new sore throat.  Patient does not have a fever greater than 99.5.

## 2021-05-22 NOTE — DISCHARGE SUMMARY
Service Date: 05/23/2021  Discharge Date: 05/23/2021    DATE OF ANTICIPATED DISCHARGE:  05/23/2021    BRIEF HISTORY:  Maribel Yang is a very pleasant 70-year-old female with history of chronic kidney disease, status post kidney transplant in 2004, history of coronary artery disease, status post heart failure, RCA stent on 04/07/2021, recent EVAR with left-to-right femoral bypass graft for a 5 cm AAA, who was hospitalized from 04/23-05/02/2021 on the [facility] for treatment of sepsis secondary to right femoral groin access site and perigraft abscess.  She underwent a groin washout, sartorius muscle flap and redo fem-fem bypass with cadaveric artery on 04/25/2021.  Hospital course had been complicated by inferior ST elevations, for which she underwent coronary artery catheterization with the finding of diffuse coronary vasospasm with complete occlusion of the RCA, which reopened with nitroglycerin.  For further details of the hospital course preceding the TCU admission, please see the discharge summary note of 05/01/2021.    HOSPITAL COURSE:  The following medical issues were addressed during her TCU stay:  1.  Methicillin-resistant Staphylococcus aureus infection of bovine fem-fem bypass graft with MSSA bacteremia, EVAR: the patient was maintained on cefazolin without incident.  Anticipated duration of treatment will be through 06/08/2021, pending outpatient Vascular Surgery assessment.  The patient was followed by the wound nurse and was seen by Vascular Surgery during her TCU stay.  She continues to have wound VAC on bilateral groin surgical wounds, which are healing well per Vascular Surgery.  She will discharge with wound VACs in place and will continue IV antibiotics as noted above.  2.  Coronary artery disease, status post RCA stent on 04/07/2021, with subsequent finding of diffuse coronary artery vasospasm at the time of repeat heart catheterization during the most recent admission: she remains on aspirin,  Brilinta and Imdur.  Ejection fraction per echo 04/26/2021 was 35-40%.  Beta blockers have been discontinued, so as not to increased risk of vasospasm.  3.  Status post living donor kidney transplant on 09/20/2004: renal function stable.  She remained on MMF (in lieu of sirolimus) as well as prednisone without change in medications.  4.  History of recurrent DVT:  The patient was maintained on aspirin and Brilinta.  She has taken Eliquis in the past; however, this has not been resumed per request of Vascular Surgery.  There should be consideration in the future for resuming apixaban.  This will be deferred to Vascular Surgery and Cardiology.  5.  Hypertension:  Stable on current medications.  6.  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  7.  Deconditioning:  The patient's functional status did progress during her stay in the TCU.  She is now independent with her cares and does not need home therapies.    On the day of discharge, Pt felt well  Vitals table  Wt 40.8 Kg  Lungs clear  CV rrr  No LE edema  Wound vacs in place both groins    Ms. Yang will discharge to home on 05/23/2021 with home nursing to assist in wound VAC management and IV antibiotic administration.  The duration of antibiotics is tentatively scheduled to be through 06/08.  The patient will be seen by Vascular Surgery prior to that time, at which time adjustment in the antibiotic regimen may be made.    DISCHARGE MEDICATIONS:    1.  Aspirin 81 mg daily.  2.  Lipitor 80 mg daily.  3.  Calcium carbonate 600 mg with vitamin D 400 units, 1 tablet twice daily.  4.  Cefazolin 2 grams every 12 hours with plan discharged discontinuation date of 06/08/2021.  5.  Prednisone 5 mg daily.  6.  Brilinta 90 mg q.12 hours.    7.  Tylenol on a p.r.n. basis.    8.  Imdur 60 mg every morning.    9.  Zestril 5 mg daily.  10.  Mycophenolate 360 mg twice daily.  11.  Oxycodone 2.5 mg q.6  hours p.r.n. pain, dispensed #20 of 5 mg tablets.  12.  Flonase 1 sniff in each nostril daily.  13.  Protonix 40 mg daily, which is for GI protection view of dual antibiotic therapy and chronic prednisone.        The patient's will be contacted by the Vascular Surgery Clinic to arrange followup within the next week.  She will also arrange followup with her Cardiology and with the primary care provider, Dr. Martinez.    Rob Warner MD        D: 2021   T: 2021   MT: ADRIAN    Name:     ERASMO MERINO  MRN:      [id number]        Account:      [id number]   :      1950           Service Date: 2021                                  Discharge Date: 2021     Document: [id number]    cc:  Lisa Matrinez DO

## 2021-05-22 NOTE — PLAN OF CARE
Alert and oriented, VSS. Reported a headache despite earlier tylenol dose; requested 2.5 mg oxycodone as she said that has helped in the past, effective. No SOB. Appetite fair. Encouraged fluid intake. IV antibiotic infused via PICC. Bilateral groin wound VAC running at -125. Sacral mepilex for protection, CDI. Continent of urine, LBM 5/21. Knitting and watching TV before bed.     Patient's most recent vital signs are:     Vital signs:  BP: 117/87  Temp: 97.6  HR: 83  RR: 18  SpO2: 96 %     Patient does not have new respiratory symptoms.  Patient does not have new sore throat.  Patient does not have a fever greater than 99.5.

## 2021-05-22 NOTE — PLAN OF CARE
Alert and oriented x4. Denied any pain. Slept very well per her statement.  Bilateral groin wound-vac with no issues overnight, continuous suction at 125 mmHg. Mod I with a walker for ambulation to the bathroom, continent of bowel and bladder. Continue POC.       Patient's most recent vital signs are:     Vital signs:  BP: 113/81  Temp: 96.5  HR: 88  RR: 18  SpO2: 98 %     Patient does not have new respiratory symptoms.  Patient does not have new sore throat.  Patient does not have a fever greater than 99.5.

## 2021-05-23 ENCOUNTER — HOME INFUSION (PRE-WILLOW HOME INFUSION) (OUTPATIENT)
Dept: PHARMACY | Facility: CLINIC | Age: 71
End: 2021-05-23

## 2021-05-23 VITALS
DIASTOLIC BLOOD PRESSURE: 82 MMHG | OXYGEN SATURATION: 97 % | SYSTOLIC BLOOD PRESSURE: 109 MMHG | RESPIRATION RATE: 16 BRPM | TEMPERATURE: 96.4 F | BODY MASS INDEX: 16.44 KG/M2 | WEIGHT: 89.9 LBS | HEART RATE: 85 BPM

## 2021-05-23 PROCEDURE — 99315 NF DSCHRG MGMT 30 MIN/LESS: CPT | Performed by: INTERNAL MEDICINE

## 2021-05-23 PROCEDURE — 250N000013 HC RX MED GY IP 250 OP 250 PS 637: Performed by: INTERNAL MEDICINE

## 2021-05-23 PROCEDURE — 250N000011 HC RX IP 250 OP 636: Performed by: INTERNAL MEDICINE

## 2021-05-23 PROCEDURE — 250N000012 HC RX MED GY IP 250 OP 636 PS 637: Performed by: INTERNAL MEDICINE

## 2021-05-23 RX ADMIN — MYCOPHENOLIC ACID 360 MG: 360 TABLET, DELAYED RELEASE ORAL at 09:15

## 2021-05-23 RX ADMIN — PREDNISONE 5 MG: 5 TABLET ORAL at 09:18

## 2021-05-23 RX ADMIN — FLUTICASONE PROPIONATE 1 SPRAY: 50 SPRAY, METERED NASAL at 09:17

## 2021-05-23 RX ADMIN — TICAGRELOR 90 MG: 90 TABLET ORAL at 09:15

## 2021-05-23 RX ADMIN — SODIUM CHLORIDE, PRESERVATIVE FREE 5 ML: 5 INJECTION INTRAVENOUS at 10:28

## 2021-05-23 RX ADMIN — ISOSORBIDE MONONITRATE 60 MG: 60 TABLET, EXTENDED RELEASE ORAL at 09:15

## 2021-05-23 RX ADMIN — CALCIUM CARBONATE 600 MG (1,500 MG)-VITAMIN D3 400 UNIT TABLET 1 TABLET: at 09:15

## 2021-05-23 RX ADMIN — SODIUM CHLORIDE, PRESERVATIVE FREE 5 ML: 5 INJECTION INTRAVENOUS at 13:11

## 2021-05-23 RX ADMIN — ATORVASTATIN CALCIUM 80 MG: 40 TABLET, FILM COATED ORAL at 09:15

## 2021-05-23 RX ADMIN — PANTOPRAZOLE SODIUM 40 MG: 40 TABLET, DELAYED RELEASE ORAL at 06:29

## 2021-05-23 RX ADMIN — ASPIRIN 81 MG CHEWABLE TABLET 81 MG: 81 TABLET CHEWABLE at 09:14

## 2021-05-23 RX ADMIN — LISINOPRIL 5 MG: 5 TABLET ORAL at 09:15

## 2021-05-23 RX ADMIN — CEFAZOLIN SODIUM 2 G: 2 INJECTION, SOLUTION INTRAVENOUS at 09:15

## 2021-05-23 NOTE — PLAN OF CARE
Alert and oriented, VSS. Denies pain, no SOB. Daughter Aminata visited. Appetite good. Encouraged fluids. IV antibiotic infused via PICC. Bilateral groin VAC dressings intact, running at -125 w/ little to no output noted in cannister since yesterday. Mod I in room. Continent of urine, LBM 5/22. Plans to discharge tomorrow.     Patient's most recent vital signs are:     Vital signs:  BP: 122/84  Temp: 96.1  HR: 92  RR: 16  SpO2: 98 %     Patient does not have new respiratory symptoms.  Patient does not have new sore throat.  Patient does not have a fever greater than 99.5.

## 2021-05-23 NOTE — PLAN OF CARE
Pt A&O x4. Denies chest pain and SOB. IV abx infused via DL PICC without any issues. Pt is discharging with PICC line. Dressing and caps changed this shift. See flow sheets for details. Pt is also discharging with wound vac. Home wound vac arrived to the unit at 1330 and writer got connected to pt. No alarms noted and running at 125 mmHg continuous. Went over discharge paperwork with patient. Questions were encouraged and answered. Some education given on the wound vac and PICC line. Pt is having home health RN come out tomorrow. Pt left with daughter around 1445, stable.

## 2021-05-23 NOTE — PLAN OF CARE
Alert and oriented x4. No complaints of pain overnight. Mod I with a 4ww. No SOB. Reported no concerns with B/B. Plan in place for patient to discharge today at noon, waiting for new wound-vac. Patient cites she is very glad to go home.       Patient's most recent vital signs are:     Vital signs:  BP: 122/84  Temp: 96.1  HR: 92  RR: 16  SpO2: 98 %     Patient does not have new respiratory symptoms.  Patient does not have new sore throat.  Patient does not have a fever greater than 99.5.

## 2021-05-24 ENCOUNTER — TELEPHONE (OUTPATIENT)
Dept: FAMILY MEDICINE | Facility: CLINIC | Age: 71
End: 2021-05-24

## 2021-05-24 LAB
FUNGUS SPEC CULT: NORMAL
SPECIMEN SOURCE: NORMAL

## 2021-05-24 NOTE — TELEPHONE ENCOUNTER
Reason for Call:  Home Health Care    Brigid with [facility] called regarding (reason for call): verbal orders    Orders are needed for this patient. Yes    PT: N/A     OT: N/A    Skilled Nursing: 3 Ties a week for 7 weeks  Also FYI- Patient is being folloed by Infectious Disease And Surgeon    Pt Provider: Michelle    Phone Number Homecare Nurse can be reached at: [phone]    Can we leave a detailed message on this number? YES    Phone number patient can be reached at: Other phone number:  N/A*    Best Time: Today    Call taken on 5/24/2021 at 3:59 PM by Ana Laura Elaine

## 2021-05-24 NOTE — PROGRESS NOTES
Physical Therapy Discharge Summary    Reason for therapy discharge:    Discharged to home.    Progress towards therapy goal(s). See goals on Care Plan in [facility] electronic health record for goal details.  Goals met    Therapy recommendation(s):    No further therapy is recommended. Pt has met goals for functional mobility in home setting.

## 2021-05-24 NOTE — PROGRESS NOTES
Pain Assessment    The following is the pain interview as conducted by the [facility] RN caring for the patient on 5/22/21. This assessment is required by the [facility] for all patients in [place] [facility] (Skilled Nursing Facilities).       1. Have you had pain or hurting at any time in the last 5 days?     [x]YES  []NO  []UNABLE TO ANSWER   []Not applicable    2. How much of the time have you experienced pain or hurting over the last 5 days?    []ALMOST CONSTANTLY []FREQUENTLY [x]OCCASIONALLY []RARELY []UNABLE TO ANSWER []Not applicable      3. Over the past 5 days, has pain made it hard for you to sleep at night?     []YES  [x]NO  []UNABLE TO ANSWER   []Not applicable    4. Over the past 5 days, have you limited your day-to-day activities because of pain?     []YES  [x]NO  []UNABLE TO ANSWER    []Not applicable    5. Pain intensity (Numeric scale used first-if patient unable to answer, verbal scale to be used.)    Numeric Scale: Please rate your worst pain over the last 5 days on a zero to ten scale, with zero being no pain and ten being the worst pain you can imagine.     Number:        4  /10    Verbal Scale: Please rate the intensity of your worst pain over the last 5 days.     [x]Not applicable []MILD/MODERATE-SEVERE []VERY SEVERE/HORRIBLE []UNABLE TO ANSWER       Carmen Shahid RN, BSN  [facility]      [address] [address]  [email or web address]  [email or web address] [email or web address]   Office:[phone]  Gender pronouns: she/her

## 2021-05-26 ENCOUNTER — HOME INFUSION (PRE-WILLOW HOME INFUSION) (OUTPATIENT)
Dept: PHARMACY | Facility: CLINIC | Age: 71
End: 2021-05-26

## 2021-05-27 NOTE — TELEPHONE ENCOUNTER
Reason for Call:  Form, our goal is to have forms completed with 72 hours, however, some forms may require a visit or additional information.    Type of letter, form or note:  skilled nurse /wound care 4/15/2021-6/13/2021    Who is the form from?: [facility] (if other please explain)    Where did the form come from: form was faxed in    What [facility] the [facility]    Where the form was placed: teresa Box/Folder    What number is listed as a contact on the form?:  Fax [phone]       Additional comments:     Call taken on 5/27/2021 at 10:55 AM by Hitesh Mcgill

## 2021-05-27 NOTE — PROGRESS NOTES
This is a recent snapshot of the patient's [facility] medical record.  For current drug dose and complete information and questions, call [phone] or In Basket pool, [facility] home infusion ([id number])  CSN Number:  [id number]

## 2021-05-28 ENCOUNTER — MEDICAL CORRESPONDENCE (OUTPATIENT)
Dept: HEALTH INFORMATION MANAGEMENT | Facility: CLINIC | Age: 71
End: 2021-05-28

## 2021-05-28 LAB
AST SERPL W P-5'-P-CCNC: 13 U/L (ref 0–45)
BASOPHILS # BLD AUTO: 0 10E9/L (ref 0–0.2)
BASOPHILS NFR BLD AUTO: 0.4 %
BUN SERPL-MCNC: 21 MG/DL (ref 7–30)
CREAT SERPL-MCNC: 1.06 MG/DL (ref 0.52–1.04)
CRP SERPL-MCNC: 15 MG/L (ref 0–8)
DIFFERENTIAL METHOD BLD: ABNORMAL
EOSINOPHIL # BLD AUTO: 0.1 10E9/L (ref 0–0.7)
EOSINOPHIL NFR BLD AUTO: 0.8 %
ERYTHROCYTE [DISTWIDTH] IN BLOOD BY AUTOMATED COUNT: 18.3 % (ref 10–15)
ERYTHROCYTE [SEDIMENTATION RATE] IN BLOOD BY WESTERGREN METHOD: 22 MM/H (ref 0–30)
GFR SERPL CREATININE-BSD FRML MDRD: 54 ML/MIN/{1.73_M2}
HCT VFR BLD AUTO: 31.8 % (ref 35–47)
HGB BLD-MCNC: 9.5 G/DL (ref 11.7–15.7)
IMM GRANULOCYTES # BLD: 0.1 10E9/L (ref 0–0.4)
IMM GRANULOCYTES NFR BLD: 0.5 %
LYMPHOCYTES # BLD AUTO: 0.3 10E9/L (ref 0.8–5.3)
LYMPHOCYTES NFR BLD AUTO: 3.1 %
MCH RBC QN AUTO: 28.6 PG (ref 26.5–33)
MCHC RBC AUTO-ENTMCNC: 29.9 G/DL (ref 31.5–36.5)
MCV RBC AUTO: 96 FL (ref 78–100)
MONOCYTES # BLD AUTO: 0.6 10E9/L (ref 0–1.3)
MONOCYTES NFR BLD AUTO: 6.4 %
NEUTROPHILS # BLD AUTO: 8.1 10E9/L (ref 1.6–8.3)
NEUTROPHILS NFR BLD AUTO: 88.8 %
NRBC # BLD AUTO: 0 10*3/UL
NRBC BLD AUTO-RTO: 0 /100
PLATELET # BLD AUTO: 340 10E9/L (ref 150–450)
RBC # BLD AUTO: 3.32 10E12/L (ref 3.8–5.2)
WBC # BLD AUTO: 9.1 10E9/L (ref 4–11)

## 2021-05-28 PROCEDURE — 85025 COMPLETE CBC W/AUTO DIFF WBC: CPT | Performed by: INTERNAL MEDICINE

## 2021-05-28 PROCEDURE — 86140 C-REACTIVE PROTEIN: CPT | Performed by: INTERNAL MEDICINE

## 2021-05-28 PROCEDURE — 84520 ASSAY OF UREA NITROGEN: CPT | Performed by: INTERNAL MEDICINE

## 2021-05-28 PROCEDURE — 82565 ASSAY OF CREATININE: CPT | Performed by: INTERNAL MEDICINE

## 2021-05-28 PROCEDURE — 85652 RBC SED RATE AUTOMATED: CPT | Performed by: INTERNAL MEDICINE

## 2021-05-28 PROCEDURE — 84450 TRANSFERASE (AST) (SGOT): CPT | Performed by: INTERNAL MEDICINE

## 2021-05-31 ENCOUNTER — MEDICAL CORRESPONDENCE (OUTPATIENT)
Dept: HEALTH INFORMATION MANAGEMENT | Facility: CLINIC | Age: 71
End: 2021-05-31

## 2021-05-31 LAB
ANION GAP SERPL CALCULATED.3IONS-SCNC: 6 MMOL/L (ref 3–14)
AST SERPL W P-5'-P-CCNC: 12 U/L (ref 0–45)
BASOPHILS # BLD AUTO: 0 10E9/L (ref 0–0.2)
BASOPHILS NFR BLD AUTO: 0.5 %
BUN SERPL-MCNC: 21 MG/DL (ref 7–30)
CALCIUM SERPL-MCNC: 9.1 MG/DL (ref 8.5–10.1)
CHLORIDE SERPL-SCNC: 105 MMOL/L (ref 94–109)
CO2 SERPL-SCNC: 27 MMOL/L (ref 20–32)
CREAT SERPL-MCNC: 1.08 MG/DL (ref 0.52–1.04)
CRP SERPL-MCNC: 23.2 MG/L (ref 0–8)
DIFFERENTIAL METHOD BLD: ABNORMAL
EOSINOPHIL # BLD AUTO: 0.1 10E9/L (ref 0–0.7)
EOSINOPHIL NFR BLD AUTO: 1.7 %
ERYTHROCYTE [DISTWIDTH] IN BLOOD BY AUTOMATED COUNT: 18.3 % (ref 10–15)
ERYTHROCYTE [SEDIMENTATION RATE] IN BLOOD BY WESTERGREN METHOD: 16 MM/H (ref 0–30)
GFR SERPL CREATININE-BSD FRML MDRD: 53 ML/MIN/{1.73_M2}
GLUCOSE SERPL-MCNC: 56 MG/DL (ref 70–99)
HCT VFR BLD AUTO: 33.6 % (ref 35–47)
HGB BLD-MCNC: 10 G/DL (ref 11.7–15.7)
IMM GRANULOCYTES # BLD: 0 10E9/L (ref 0–0.4)
IMM GRANULOCYTES NFR BLD: 0.4 %
LYMPHOCYTES # BLD AUTO: 0.5 10E9/L (ref 0.8–5.3)
LYMPHOCYTES NFR BLD AUTO: 6.3 %
MCH RBC QN AUTO: 28.2 PG (ref 26.5–33)
MCHC RBC AUTO-ENTMCNC: 29.8 G/DL (ref 31.5–36.5)
MCV RBC AUTO: 95 FL (ref 78–100)
MONOCYTES # BLD AUTO: 1 10E9/L (ref 0–1.3)
MONOCYTES NFR BLD AUTO: 12.4 %
NEUTROPHILS # BLD AUTO: 6.2 10E9/L (ref 1.6–8.3)
NEUTROPHILS NFR BLD AUTO: 78.7 %
NRBC # BLD AUTO: 0 10*3/UL
NRBC BLD AUTO-RTO: 0 /100
PLATELET # BLD AUTO: 313 10E9/L (ref 150–450)
POTASSIUM SERPL-SCNC: 4 MMOL/L (ref 3.4–5.3)
RBC # BLD AUTO: 3.54 10E12/L (ref 3.8–5.2)
SODIUM SERPL-SCNC: 138 MMOL/L (ref 133–144)
WBC # BLD AUTO: 7.8 10E9/L (ref 4–11)

## 2021-05-31 PROCEDURE — 85652 RBC SED RATE AUTOMATED: CPT | Performed by: INTERNAL MEDICINE

## 2021-05-31 PROCEDURE — 86140 C-REACTIVE PROTEIN: CPT | Performed by: INTERNAL MEDICINE

## 2021-05-31 PROCEDURE — 80048 BASIC METABOLIC PNL TOTAL CA: CPT | Performed by: INTERNAL MEDICINE

## 2021-05-31 PROCEDURE — 85025 COMPLETE CBC W/AUTO DIFF WBC: CPT | Performed by: INTERNAL MEDICINE

## 2021-05-31 PROCEDURE — 84450 TRANSFERASE (AST) (SGOT): CPT | Performed by: INTERNAL MEDICINE

## 2021-06-01 ENCOUNTER — APPOINTMENT (OUTPATIENT)
Dept: LAB | Facility: CLINIC | Age: 71
End: 2021-06-01
Attending: INTERNAL MEDICINE
Payer: COMMERCIAL

## 2021-06-01 NOTE — PROGRESS NOTES
Cardiology Staff Note    A ROLY was attempted on 4E but I elected to abandon it due to the patient being unable to tolerate probe insertion despite high amounts of sedation.    This procedure will be reattempted in the OR on 04/28 under anesthesia. The patient Nurse was at bedside and her next-of-kin was notified of this.       Beau Kumar [facility], [place]    Cardiovascular Division  Pager [phone]

## 2021-06-01 NOTE — PROGRESS NOTES
This is a recent snapshot of the patient's [facility] medical record.  For current drug dose and complete information and questions, call [phone] or In Basket pool, [facility]v home infusion ([id number])  CSN Number:  [id number]

## 2021-06-02 ENCOUNTER — HOME INFUSION (PRE-WILLOW HOME INFUSION) (OUTPATIENT)
Dept: PHARMACY | Facility: CLINIC | Age: 71
End: 2021-06-02

## 2021-06-03 ENCOUNTER — HOME INFUSION (PRE-WILLOW HOME INFUSION) (OUTPATIENT)
Dept: PHARMACY | Facility: CLINIC | Age: 71
End: 2021-06-03

## 2021-06-03 ENCOUNTER — OFFICE VISIT (OUTPATIENT)
Dept: INFECTIOUS DISEASES | Facility: CLINIC | Age: 71
End: 2021-06-03
Attending: STUDENT IN AN ORGANIZED HEALTH CARE EDUCATION/TRAINING PROGRAM
Payer: COMMERCIAL

## 2021-06-03 VITALS
WEIGHT: 91.7 LBS | SYSTOLIC BLOOD PRESSURE: 177 MMHG | TEMPERATURE: 98 F | HEART RATE: 99 BPM | BODY MASS INDEX: 16.77 KG/M2 | DIASTOLIC BLOOD PRESSURE: 117 MMHG | OXYGEN SATURATION: 100 %

## 2021-06-03 DIAGNOSIS — I71.40 ABDOMINAL AORTIC ANEURYSM (AAA) WITHOUT RUPTURE (H): ICD-10-CM

## 2021-06-03 DIAGNOSIS — B95.61 BACTEREMIA DUE TO METHICILLIN SUSCEPTIBLE STAPHYLOCOCCUS AUREUS (MSSA): Primary | ICD-10-CM

## 2021-06-03 DIAGNOSIS — L02.214 ABSCESS OF GROIN: ICD-10-CM

## 2021-06-03 DIAGNOSIS — Z95.828 HISTORY OF REPAIR OF ANEURYSM OF ABDOMINAL AORTA USING ENDOVASCULAR STENT GRAFT: ICD-10-CM

## 2021-06-03 DIAGNOSIS — R78.81 BACTEREMIA DUE TO METHICILLIN SUSCEPTIBLE STAPHYLOCOCCUS AUREUS (MSSA): Primary | ICD-10-CM

## 2021-06-03 DIAGNOSIS — Z94.0 KIDNEY REPLACED BY TRANSPLANT: ICD-10-CM

## 2021-06-03 PROCEDURE — 99215 OFFICE O/P EST HI 40 MIN: CPT | Mod: 24 | Performed by: STUDENT IN AN ORGANIZED HEALTH CARE EDUCATION/TRAINING PROGRAM

## 2021-06-03 ASSESSMENT — ENCOUNTER SYMPTOMS
WHEEZING: 0
SKIN CHANGES: 0
SHORTNESS OF BREATH: 0
NERVOUS/ANXIOUS: 1
SPUTUM PRODUCTION: 0
DYSPNEA ON EXERTION: 1
POSTURAL DYSPNEA: 0
COUGH: 0
PALPITATIONS: 0
POOR WOUND HEALING: 1
SLEEP DISTURBANCES DUE TO BREATHING: 0
HYPERTENSION: 0
INSOMNIA: 1
HYPOTENSION: 0
COUGH DISTURBING SLEEP: 0
EXERCISE INTOLERANCE: 1
DEPRESSION: 0
ORTHOPNEA: 0
NAIL CHANGES: 0
LEG PAIN: 0
SNORES LOUDLY: 0
DECREASED CONCENTRATION: 0
LIGHT-HEADEDNESS: 0
PANIC: 0
HEMOPTYSIS: 0
SYNCOPE: 0

## 2021-06-03 ASSESSMENT — PAIN SCALES - GENERAL: PAINLEVEL: NO PAIN (0)

## 2021-06-03 NOTE — PROGRESS NOTES
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   Date Value Ref Range Status   04/29/2021 No growth  Final   04/29/2021 No growth  Final   04/28/2021 No growth  Final   04/28/2021 No growth  Final   04/27/2021 No growth  Final   04/27/2021 No growth  Final   04/26/2021 (A)  Final    Cultured on the 1st day of incubation:  Staphylococcus aureus  Susceptibility testing done on previous specimen     04/26/2021   Final    Critical Value/Significant Value, preliminary result only, called to and read back by   Yary Morrow RN @1923 4.27.21 .     04/26/2021 (A)  Final    Cultured on the 1st day of incubation:  Staphylococcus aureus  Susceptibility testing done on previous specimen     04/26/2021   Final    Critical Value/Significant Value, preliminary result only, called to and read back by  Yary Morrow RN @1818 4.27.21      04/25/2021 No anaerobes isolated  Final   04/25/2021 Culture negative after 29 days  Final   04/25/2021 Light growth  Staphylococcus aureus   (A)  Final   04/25/2021 Heavy growth  Staphylococcus aureus   (A)  Final   04/25/2021 No anaerobes isolated  Final   04/25/2021 Culture negative after 29 days   Final   04/24/2021 Moderate growth  Staphylococcus aureus   (A)  Final   04/24/2021 No anaerobes isolated  Final   04/24/2021 Culture negative after 30 days  Final   04/23/2021 No growth  Final   04/23/2021 (A)  Final    Cultured on the 2nd day of incubation:  Staphylococcus aureus     04/23/2021   Final    Critical Value/Significant Value, preliminary result only, called to and read back by  JESUS ALBERTO HOLCOMB RN ON 4.25.21 @ 1626. DT     04/23/2021   Final    (Note)  POSITIVE for STAPHYLOCOCCUS AUREUS and NEGATIVE for the mecA gene  (not MRSA) by [facility] multiplex nucleic acid test. The mecA gene was  not detected. Final identification and antimicrobial susceptibility  testing will be verified by standard methods.    Specimen tested with [employer] multiplex, gram-positive blood culture  nucleic acid test for the following targets: Staph aureus, Staph  epidermidis, Staph lugdunensis, other Staph species, Enterococcus  faecalis, Enterococcus faecium, Streptococcus species, S. agalactiae,  S. anginosus grp., S. pneumoniae, S. pyogenes, Listeria sp., mecA  (methicillin resistance) and Juan/B (vancomycin resistance).    Critical Value/Significant Value called to and read back by JESUS ALBERTO HOLCOMB RN ON 4.25.21 @ 1902. DT     05/24/2016 No Beta Streptococcus isolated  Final   01/20/2014 No growth  Final   10/23/2009 No growth  Final   10/25/2008 No yeast isolated  Final   10/25/2008 No growth  Final   02/24/2006 No yeast isolated  Final   02/24/2006 No growth  Final   11/22/2005   Final    Normal melodie  No beta hemolytic Streptococcus Group A isolated   11/22/2005   Final    No growth  No Salmonella, Shigella, Campylobacter or E coli 0:157 isolated.   11/07/2005 No Beta Streptococcus isolated  Final   08/09/2005 No growth  Final   07/20/2005 No growth  Final   07/18/2005 No yeast isolated  Final   07/18/2005 <10,000 colonies/mL Staphylococcus species  Final     Comment:     <10,000 colonies/mL Diptheroids  Multiple species  present, probable perineal contamination.  Susceptibility testing not routinely done   07/14/2005 No growth  Final   06/24/2005 No yeast isolated  Final   06/24/2005 No growth  Final    Specimen Description   Date Value Ref Range Status   04/29/2021 Blood Left Hand  Final   04/29/2021 Blood  Final   04/28/2021 Blood Right Hand  Final   04/28/2021 Blood Right Arm  Final   04/27/2021 Blood  Final   04/27/2021 Blood Right Arm  Final   04/26/2021 Blood Right Arm  Final   04/26/2021 Blood Right Arm  Final   04/25/2021 Other Infected graft  Final   04/25/2021 Other Infected graft  Final   04/25/2021 Other Infected graft  Final   04/25/2021 Other Infected graft  Final   04/25/2021 Groin Wound Left Abscess  Final   04/25/2021 Groin Wound Left Abscess  Final   04/25/2021 Groin Wound Left Abscess  Final   04/25/2021 Groin Wound Left Abscess  Final   04/24/2021 Right Groin Abscess  Final   04/24/2021 Right Groin Abscess  Final   04/24/2021 Right Groin Abscess  Final   04/24/2021 Right Groin Abscess  Final   04/23/2021 Blood Right Hand  Final   04/23/2021 Blood Left Arm  Final   07/11/2017 Feces  Final   07/11/2017 Feces  Final   07/11/2017 Feces  Final   05/24/2016 Throat  Final   05/24/2016 Throat  Final   01/20/2014 Midstream Urine  Final   10/23/2009 Blood Venipuncture Collection  Final   10/25/2008 Midstream Urine  Final   10/25/2008 Midstream Urine  Final   10/25/2008 Midstream Urine  Final   02/24/2006 Midstream Urine  Final   02/24/2006 Midstream Urine  Final   02/24/2006 Midstream Urine  Final   11/22/2005 Throat  Final   11/22/2005 Feces  Final   11/22/2005 Feces  Final   11/22/2005 Feces  Final   11/22/2005 Feces  Final        Last check of C difficile  C Diff Toxin B PCR   Date Value Ref Range Status   07/11/2017  NEG Final    Negative  Negative: Clostridium difficile target DNA sequences NOT detected, presumed   negative for Clostridium difficile toxin B or the number of bacteria present   may be below the limit of  detection for the test.   FDA approved assay performed using [employer] GeneXpert real-time PCR.   A negative result does not exclude actual disease due to Clostridium difficile   and may be due to improper collection, handling and storage of the specimen or   the number of organisms in the specimen is below the detection limit of the   assay.         Quantiferon testing   Recent Labs   Lab Test 05/31/21  1130 05/28/21  1141 01/08/14  1307 01/08/14  1307   TBRSLT  --   --   --  Negative   TBAGN  --   --   --  0.00   LYMPH 6.3 3.1   < >  --     < > = values in this interval not displayed.       Infection Studies to assess Diarrhea   Recent Labs   Lab Test 07/11/17  0950 07/11/17  0948   POPRT Routine parasitology exam negative  Cryptosporidium, Cyclospora, and Microsporidia are not readily detected by this   method. A single negative specimen does not rule out parasitic infection.    --    EPCAMP  --  Not Detected   EPSALM  --  Not Detected   EPSHGL  --  Not Detected   EPVIB  --  Not Detected   EPROTA  --  Not Detected   EPNORO  --  Not Detected   EPYER  --  Not Detected   GIART Negative for Giardia lamblia specific antigen by immunoassay.  --        Virology:  Coronavirus-19 testing    Recent Labs   Lab Test 05/13/21  1500 05/04/21  1305 04/30/21  1843 04/23/21 2209 04/02/21  1056   [id number] Nasopharyngeal Nasopharyngeal Nasopharyngeal  --  Nasopharyngeal   SARSCOVRES NEGATIVE NEGATIVE NEGATIVE NEGATIVE NEGATIVE   [id number]  --   --   --   --  Nasopharyngeal   [id number]  --   --   --   --  Test received-See reflex to IDDL test SARS CoV2 (COVID-19) Virus RT-PCR       Respiratory virus (non-coronavirus-19) testing    Recent Labs   Lab Test 04/23/21 2209   INFZA Negative   INFZB Negative       CMV viral loads    Recent Labs   Lab Test 04/24/18  1803   CSPEC Plasma   CMVLOG Not Calculated       CMV viral loads    Log IU/mL of CMVQNT   Date Value Ref Range Status   04/24/2018 Not Calculated <2.1 [Log_IU]/mL Final          EBV DNA Copies/mL   Date Value Ref Range Status   04/26/2021 3,065 (A) EBVNEG^EBV DNA Not Detected [Copies]/mL Final     Comment:     Critical Value/Significant Value called to and read back by  Re Valverde, RN, 1501 4/29/21 JT     02/15/2019 7,769 (A) EBVNEG^EBV DNA Not Detected [Copies]/mL Final   10/30/2018 2,938 (A) EBVNEG^EBV DNA Not Detected [Copies]/mL Final   08/10/2018 4,494 (A) EBVNEG^EBV DNA Not Detected [Copies]/mL Final   07/13/2018 61,980 (A) EBVNEG^EBV DNA Not Detected [Copies]/mL Final   06/11/2018 8,955 (A) EBVNEG^EBV DNA Not Detected [Copies]/mL Final   04/24/2018 8,233 (A) EBVNEG^EBV DNA Not Detected [Copies]/mL Final   12/03/2005 <1000  Final     Comment:     Copies of Keyla-Barr virus DNA per 1 mL of whole blood. The lower limit of   detection for this assay and specimen type is 1,000 copies/mL.       Hepatitis B Testing   No lab results found.  Was the last Hepatitis B E antigen positive?   No results found for: HBEAGN     Hepatitis C Antibody   Date Value Ref Range Status   08/10/2018 Nonreactive NR^Nonreactive Final     Comment:     Assay performance characteristics have not been established for newborns,   infants, and children         Pathology:      Imaging:  Results for orders placed or performed during the hospital encounter of 04/23/21   CT Abdomen Pelvis w/o Contrast    Narrative    EXAMINATION: CT ABDOMEN PELVIS W/O CONTRAST, 4/23/2021 9:24 PM    TECHNIQUE:  Helical CT images from the lung bases through the  symphysis pubis were obtained without IV contrast. Contrast dose: None    COMPARISON: Chest abdomen and pelvis CTA 3/22/2021    HISTORY: Abdominal abscess/infection suspected. Status post EVAR and  aorto uniliac graft 4/6/21    FINDINGS:    Abdomen and pelvis: Postprocedural changes of abdominal aorta EVAR  with left uni-iliac graft. The distal limb of the graft is positioned  in the left common femoral artery. The proximal end of the graft is  located above the  native renal arteries The abdominal aortic aneurysm  sac measures 6.2 x 5.8 cm on image 185 of series 5. There is mild  stranding surrounding the aneurysm sac consistent with postsurgical  changes. Vascular patency not evaluated on this noncontrast study.     Unremarkable noncontrast appearance of the liver, gallbladder,  pancreas, and spleen. Nondilated common bile duct. Left adrenal  calcification. Bilateral atrophic native kidneys. 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  A small hemorrhagic/proteinaceous cyst in the mid transplant, series 3  image 49 suspected measuring 7 mm.    No small bowel obstruction. Scattered stool throughout the colon.  Appendicoliths within nondilated appendix demonstrate. No adjacent  inflammation.    The bladder and uterus are within normal limits.    Lung bases/lower chest:  There is a pericardial effusion measuring up  to 1.5 cm in thickness on image 35 of series 5. Aneurysmal lower  thoracic aorta measuring up to 4.6 cm. Atherosclerosis. The lung bases  are relatively clear. Emphysematous changes. No pleural effusion.  There may be a small Bo[place]k fat-containing hernia along the right  hemidiaphragm.    Bones and soft tissues: There is an anterior abdominal wall fluid  collection with thick walls loculations measuring 7.9 x 3.4 cm and 6.0  x 2.1 cm on image 327 of series 5, extending from the right groin  surgical site right common femoral artery along the femorofemoral  bypass graft to the left common femoral artery. There is surrounding  inflammatory stranding of the lower anterior abdominal wall and right  groin. No acute or aggressive appearing bone lesion. Body wall edema.      Impression    IMPRESSION:   1. Large thick-walled fluid collection in the lower abdominal wall  extending from the right groin surgical site and right common femoral  artery along the femoral-femoral bypass graft to the left common  femoral artery.  Surgical consultation is recommended.    2. Postprocedural changes of abdominal aortic EVAR with left uni-iliac  graft.     I have personally reviewed the examination and initial interpretation  and I agree with the findings.    ELISSA RIVAS MD   US Lower Ext Arterial Duplex Limited Bilat    Narrative    Exam: US LOWER EXT ARTERIAL DUPLEX LIMITED BILAT, 4/24/2021 8:06 AM    Indication: evaluate right groin, left groin, and fem-fem bypass    Comparison: CT 4/23/2021    Findings:   There is a complex fluid collection in the anterior soft tissues in  the lower abdomen measuring approximately 8.5 x 17.4 x 2.7, this  corresponds to the fluid collection noted on the abdominal CT from  4/23/2021. The femoral femoral bypass graft is patent with with  monophasic/biphasic waveforms with a sharp systolic upstroke. The left  external iliac artery above the graft, common femoral artery below the  graft and left proximal profunda femoral artery are patent with normal  triphasic waveforms. The right superficial femoral artery below the  anastomosis is patent with monophasic waveforms with a sharp systolic  upstroke. The right common femoral artery at the anastomosis is patent  with triphasic waveforms.      Impression    Impression:   1. Patent femoral-femoral bypass graft.  2. Large complex lower abdominal wall postoperative fluid collection  measuring approximately 8.5 x 17.4 x 2.7 cm, this was previously  visualized on CT 4/23/2021 and ultrasound 4/6/2021 however appears to  have increased in size. Consider surgical consultation.    I have personally reviewed the examination and initial interpretation  and I agree with the findings.    MAGALIE MARS MD   XR Chest Port 1 View    Narrative    XR CHEST PORT 1 VIEW  4/25/2021 9:52 PM      HISTORY: intrap op hemodynamic changes    COMPARISON: Chest abdomen and pelvis CT 3/22/2021    FINDINGS: AP view of the chest. The cardiac silhouette is not  enlarged. Known thoracic aortic  aneurysm. Emphysematous changes.  Patchy opacities in the right greater than left lung bases. Right  upper lobe opacities, consistent with scarring on prior CT. Trace left  pleural effusion. Accessory azygous lobe. No pneumothorax.      Impression    IMPRESSION:   1. Patchy right greater than left basilar opacities, atelectasis  versus pneumonia.  2. Trace left pleural effusion.    I have personally reviewed the examination and initial interpretation  and I agree with the findings.    ELISSA RIVAS MD   XR Chest Port 1 View    Narrative    Exam: XR CHEST PORT 1 VIEW, 4/30/2021 10:19 AM    Indication: PICC line placement    Comparison: 4/25/2021    Findings:   Frontal view of the chest. Right upper extremity PICC tip projects  over the low SVC. Portable EKG device projects over the left chest.  Partially visualized abdominal aortic stent graft. Stable cardiac  silhouette. Coronary artery stents. Normal variant azygos lobe. Slight  leftward tracheal deviation, similar to prior. Right upper lobe  scarring/nodularity is less apparent than on the prior and notable on  the 3/22/2021 CT. Streaky bibasilar opacities. Costophrenic angles are  collimated off the field-of-view, however there is no large pleural  effusion. No pneumothorax.      Impression    Impression:   1. Right upper extremity PICC tip projects over the low SVC.  2. Bibasilar atelectasis.    I have personally reviewed the examination and initial interpretation  and I agree with the findings.    EMILIE PRESSLEY MD   US Upper Extremity Venous Duplex Left    Narrative    EXAMINATION: US UPPER EXTREMITY VENOUS DUPLEX LEFT  5/2/2021 12:18 PM       CLINICAL HISTORY: Assess for DVT. L arm pain, L > R swelling    COMPARISON: None        PROCEDURE COMMENTS: Ultrasound was performed of the deep venous system  of the left upper extremity using grayscale, color, and spectral  Doppler.    FINDINGS:  The internal jugular, brachiocephalic, subclavian, brachial,  and  basilic veins are visualized and are patent and fully compressible.  Extensive thrombosis of the cephalic vein extending from the proximal  left upper extremity to the level of the antecubital fossa. Venous  waveforms are otherwise normal. Two hypoechoic fluid collections  likely representing hematomas measuring approximately 6.2 x 1.6 x 0.9  cm and 1.6 x 0.8 x 0.4 cm located in the ventral distal forearm.      Impression    IMPRESSION:    1. Extensive thrombosis involving the cephalic vein from the proximal  upper extremity to the level of the antecubital fossa. Small areas of  superficial hematoma in the left distal forearm as described above.  2. No deep vein thrombosis identified.    I have personally reviewed the examination and initial interpretation  and I agree with the findings.    MAGALIE MARS MD   Echocardiogram Limited    Narrative    [id number]  [id number]  [id number]  [id number]^JACOB^DAVID^JAMI     [facility]  Echocardiography Laboratory  [address] [place] [id number]     Name: ERASMO MERINO  MRN: [id number]  : 1950  Study Date: 2021 09:57 AM  Age: 70 yrs  Gender: Female  Patient Location: [facility]  Reason For Study: MI  Ordering Physician: DAVID JAMES  Performed By: Sayda Pina RDCS     BSA: 1.4 m2  Height: 62 in  Weight: 101 lb  BP: 131/82 mmHg  ______________________________________________________________________________  Procedure  Limited Portable Echo Adult. Contrast Optison. Optison (NDC #3303-2567-57)  given intravenously. Patient was given 5 ml mixture of 3 ml Optison and 6 ml  saline. 4 ml wasted.  ______________________________________________________________________________  Interpretation Summary  Moderately (EF 35-40%) reduced left ventricular function is present. There is  thinning and akinesis of the mid septal segment. Inferior wall akinesis is  present. Apical wall akinesis is present.  Trivial pericardial  effusion is present.     This study was compared with the study from 2021. No significant changes  noted.  ______________________________________________________________________________  Left Ventricle  There is thinning and akinesis of the mid septal segment. Inferior wall  akinesis is present. Apical wall akinesis is present. Moderately (EF 35-40%)  reduced left ventricular function is present.     Mitral Valve  The mitral valve is normal. Mild mitral insufficiency is present.     Aortic Valve  Trileaflet aortic sclerosis without stenosis.     Tricuspid Valve  The tricuspid valve is normal. Mild tricuspid insufficiency is present.  Pulmonary artery systolic pressure cannot be assessed.     Vessels  The inferior vena cava was normal in size with preserved respiratory  variability.     Pericardium  Trivial pericardial effusion is present.     Compared to Previous Study  This study was compared with the study from 2021 . No significant changes  noted.     ______________________________________________________________________________  Report approved by: MD Kash Tomas 2021 10:32 AM     ______________________________________________________________________________      Echo Limited    Narrative    [id number]  [id number]  [id number]  [id number]^JUANCHO^JAIEM^MILA     [facility],[place]  Echocardiography Laboratory  [address] [place] [id number]     Name: ERASMO MERINO  MRN: [id number]  : 1950  Study Date: 2021 10:18 AM  Age: 70 yrs  Gender: Female  Patient Location: [facility]  Reason For Study: MI  Ordering Physician: JAIME DAWKINS  Performed By: Marcio Hirsch     BSA: 1.5 m2  Height: 62 in  Weight: 105 lb  HR: 67  BP: 141/70 mmHg  ______________________________________________________________________________  Procedure  Limited Portable Echo Adult. Optison (NDC #8494-0658-37) given intravenously.  Patient was given 4 ml mixture  of 3 ml Optison and 6 ml saline. 5 ml wasted.  ______________________________________________________________________________  Interpretation Summary  Moderately (EF 35-40%) reduced left ventricular function is present.  Hypokinesis of the inferior, basal anterior, basal inferoseptal and apical  walls with mid/basal septal aneurysm.  Right ventricular function, chamber size, wall motion, and thickness are  normal.  Small pericardial effusion, with a maximum diameter of 1.3 cm, adjacent to the  right atrium, with no hemodynamic significance.  A left pleural effusion is present.     This study was compared with the study from 4/24/2021. No significant changes  noted.  ______________________________________________________________________________  Left Ventricle  Moderately (EF 35-40%) reduced left ventricular function is present.  Hypokinesis of the inferior, basal anterior, basal inferoseptal and apical  walls with mid/basal septal aneurysm.     Right Ventricle  Right ventricular function, chamber size, wall motion, and thickness are  normal.     Atria  Both atria appear normal.     Mitral Valve  The mitral valve is normal. Mitral leaflet thickness is normal . Trace mitral  insufficiency is present.     Aortic Valve  Aortic valve is normal in structure and function. The aortic valve is  tricuspid.     Tricuspid Valve  The tricuspid valve is normal. Trace tricuspid insufficiency is present.     Pulmonic Valve  The pulmonic valve is normal.     Pericardium  Small pericardial effusion, with a maximum diameter of 1.3 cm, adjacent to the  right atrium, with no hemodynamic significance.     Miscellaneous  A left pleural effusion is present.     Compared to Previous Study  This study was compared with the study from 4/24/2021 . No significant changes  noted.     Attestation  I have personally viewed the imaging and agree with the interpretation and  report as documented by the fellow, Otto Wise, and/or edited by  me.  ______________________________________________________________________________  Doppler Measurements & Calculations  MV E max carly: 56.3 cm/sec     ______________________________________________________________________________  Report approved by: Saúl PATEL 2021 12:23 PM         Transesophageal Echocardiogram    Narrative    [id number]  [id number]  [id number]  [id number]^MARIANA^SANDRA^CONCEPCION     [facility],[place]  Echocardiography Laboratory  [address] [place] [address]     Name: ERASMO MERINO  MRN: [id number]  : 1950  Study Date: 2021 10:23 AM  Age: 70 yrs  Gender: Female  Patient Location: [facility]  Reason For Study: 2nd degree AV Block  Ordering Physician: SANDRA PEÑA  Performed By: Otto Wise     BSA: 1.5 m2  Height: 62 in  Weight: 106 lb  HR: 99  BP: 120/80 mmHg  Attestation  I was present during ROLY probe placement by the fellow, Otto Wise. I  personally viewed the imaging and agree with the interpretation and report as  documented by the fellow.  ______________________________________________________________________________  Interpretation Summary  No evidence of endocarditis.  ______________________________________________________________________________  Procedure  Transesophageal Echocardiogram with color and spectral Doppler performed. 3D  image acquisition, reconstruction, and real-time interpretation was performed.  I was present during ROLY probe placement by the Fellow. I personally viewed  the imaging and agree with the interpretation and report as documented by the  Fellow. Procedure location Operating Room. Informed consent for  Transesophegeal echo obtained. ROLY Probe #62 was used during the procedure.  Sedation was administered and monitored by anesthesia. Sedation, endotracheal  intubation, and mechanical ventilation were initiated prior to the ROLY and  were monitored by anesthesia. The Transducer was inserted  without difficulty .  The patient tolerated the procedure well. Complications None. The patient's  rhythm is normal sinus. Good quality two-dimensional was performed and  interpreted. Good quality color and spectral Doppler were performed and  interpreted.     Left Ventricle  Moderately (EF 35-40%) reduced left ventricular function is present. There is  thinning and akinesis of the mid septal segment. Inferior wall akinesis is  present. Apical wall akinesis is present. Left ventricular size is normal.     Right Ventricle  Right ventricular function, chamber size, wall motion, and thickness are  normal.     Atria  The left atrial appendage is normal. It is free of spontaneous echo contrast  and thrombus. The left atrial appendage Doppler velocities are normal. Both  atria appear normal. The atrial septum is intact as assessed by color  Doppler .     Mitral Valve  The mitral valve is normal. Mitral leaflet thickness is normal. Trace mitral  insufficiency is present.     Aortic Valve  The aortic valve is tricuspid. Lambl's excrescence is present. Trace aortic  insufficiency is present.     Tricuspid Valve  The tricuspid valve is normal. Trace tricuspid insufficiency is present.     Pulmonic Valve  The pulmonic valve is normal.     Vessels  The aorta root is normal. The thoracic aorta is normal. 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     Pericardium  Trivial pericardial effusion is present.     Compared to Previous Study  This study was compared with the study from 4/26/2021 . There has been no  change.     ______________________________________________________________________________  Report approved by: MD Kash Tomas 04/28/2021 12:04 PM     ______________________________________________________________________________      Cardiac Catheterization    Narrative    Conclusions  -Single vessel CAD with patent RCA stent and severe diffuse RCA vasospasm    and mild LCA/LAD/LCx vasospasm. All resolved with administration of IC   nitroglycerin.     Recommendations  -Additional 90 mg of PO ticagrelor given in CCL  -Can stop heparin  -Continuous IV nitroglycerin  -Stop BB  -Consider adding calcium channel blocker for coronary spasm cautiously   given negative ionotropic effect especially with nondihydropyridine CCBs  -Dual antiplatelet treatment with low-dose aspirin daily and 90 mg   ticagrelor twice daily as scheduled for recently placed LIUDMILA and recent   STEMI.  -Low-dose aspirin po daily lifelong.  -Bedrest per protocol/usual post procedure care with TR band  -High intensity statin for CAD   -ACE inhibitor/ARB, and aldosterone blockade for CAD/STEMI ischemic   cardiomyopathy   -Continued medical management including lifestyle modifications for CV   risk factor optimizations  -Cardiac rehabilitation       *Note: Due to a large number of results and/or encounters for the requested time period, some results have not been displayed. A complete set of results can be found in Results Review.

## 2021-06-03 NOTE — PROGRESS NOTES
This is a recent snapshot of the patient's [facility] medical record.  For current drug dose and complete information and questions, call [phone] or In Basket pool, [facility] home infusion ([id number])  CSN Number:  [id number]

## 2021-06-03 NOTE — PATIENT INSTRUCTIONS
Continue Cefazolin 2gm IV every 12 hours until 6/8/21    Anticipate therapy completion on 6/8/21, at which time we will plan for removal of PICC    Starting a week after completion of cefazolin (anticipate around 6/15) would plan to check weekly surveillance BC for 4 weeks. If any of those blood cultures return positive, you will need to restart a course of cefazolin and then likely require suppressive antibiotic therapy

## 2021-06-03 NOTE — LETTER
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  0905 01/13/16  1006 01/13/16  1006   CYCLSP  --   --   --   --  <25*   RAPAMY 3.9*   < > 4.4*   < >  --    MPACID  --   --  0.60*  --   --    MPAG  --   --  28.8*  --   --     < > = values in this interval not displayed.       Microbiology:  Last Culture results with specimen source  Culture Micro   Date Value Ref Range Status   04/29/2021 No growth  Final   04/29/2021 No growth  Final   04/28/2021 No growth  Final   04/28/2021 No growth  Final   04/27/2021 No growth  Final   04/27/2021 No growth  Final   04/26/2021 (A)  Final    Cultured on the 1st day of incubation:  Staphylococcus aureus  Susceptibility testing done on previous specimen     04/26/2021   Final    Critical Value/Significant Value, preliminary result only, called to and read back by   Yary Morrow RN @1923 4.27.21 .     04/26/2021 (A)  Final    Cultured on the 1st day of incubation:  Staphylococcus aureus  Susceptibility testing done on previous specimen     04/26/2021   Final    Critical Value/Significant Value, preliminary result only, called to and read back by  Yary Morrow RN @7022 4.27.21 LM      04/25/2021 No anaerobes isolated  Final   04/25/2021 Culture negative after 29 days  Final   04/25/2021 Light growth  Staphylococcus aureus   (A)  Final   04/25/2021 Heavy growth  Staphylococcus aureus   (A)  Final   04/25/2021 No anaerobes isolated  Final   04/25/2021 Culture negative after 29 days  Final   04/24/2021 Moderate growth  Staphylococcus aureus   (A)  Final   04/24/2021 No anaerobes isolated  Final   04/24/2021 Culture negative after 30 days  Final   04/23/2021 No growth  Final   04/23/2021 (A)  Final    Cultured on the 2nd day of incubation:  Staphylococcus aureus     04/23/2021   Final    Critical Value/Significant Value, preliminary result only, called to and read back by  JESUS ALBERTO HOLCOMB RN ON 4.25.21 @ 1626. DT     04/23/2021   Final    (Note)  POSITIVE for STAPHYLOCOCCUS AUREUS and NEGATIVE for the mecA gene  (not MRSA) by [employer] multiplex nucleic acid test. The mecA gene was  not detected. Final identification and antimicrobial susceptibility  testing will be verified by standard methods.    Specimen tested with [employer] multiplex, gram-positive blood culture  nucleic acid test for the following targets: Staph aureus, Staph  epidermidis, Staph lugdunensis, other Staph species, Enterococcus  faecalis, Enterococcus faecium, Streptococcus species, S. agalactiae,  S. anginosus grp., S. pneumoniae, S. pyogenes, Listeria sp., mecA  (methicillin resistance) and Juan/B (vancomycin resistance).    Critical Value/Significant Value called to and read back by JESUS ALBERTO HOLCOMB RN ON 4.25.21 @ 1902. DT     05/24/2016 No Beta Streptococcus isolated  Final   01/20/2014 No growth  Final   10/23/2009 No growth  Final   10/25/2008 No yeast isolated  Final   10/25/2008 No growth  Final   02/24/2006 No yeast isolated  Final   02/24/2006 No growth  Final   11/22/2005   Final    Normal melodie  No beta hemolytic Streptococcus Group A isolated   11/22/2005   Final    No growth  No Salmonella, Shigella, Campylobacter or  E coli 0:157 isolated.   11/07/2005 No Beta Streptococcus isolated  Final   08/09/2005 No growth  Final   07/20/2005 No growth  Final   07/18/2005 No yeast isolated  Final   07/18/2005 <10,000 colonies/mL Staphylococcus species  Final     Comment:     <10,000 colonies/mL Diptheroids  Multiple species present, probable perineal contamination.  Susceptibility testing not routinely done   07/14/2005 No growth  Final   06/24/2005 No yeast isolated  Final   06/24/2005 No growth  Final    Specimen Description   Date Value Ref Range Status   04/29/2021 Blood Left Hand  Final   04/29/2021 Blood  Final   04/28/2021 Blood Right Hand  Final   04/28/2021 Blood Right Arm  Final   04/27/2021 Blood  Final   04/27/2021 Blood Right Arm  Final   04/26/2021 Blood Right Arm  Final   04/26/2021 Blood Right Arm  Final   04/25/2021 Other Infected graft  Final   04/25/2021 Other Infected graft  Final   04/25/2021 Other Infected graft  Final   04/25/2021 Other Infected graft  Final   04/25/2021 Groin Wound Left Abscess  Final   04/25/2021 Groin Wound Left Abscess  Final   04/25/2021 Groin Wound Left Abscess  Final   04/25/2021 Groin Wound Left Abscess  Final   04/24/2021 Right Groin Abscess  Final   04/24/2021 Right Groin Abscess  Final   04/24/2021 Right Groin Abscess  Final   04/24/2021 Right Groin Abscess  Final   04/23/2021 Blood Right Hand  Final   04/23/2021 Blood Left Arm  Final   07/11/2017 Feces  Final   07/11/2017 Feces  Final   07/11/2017 Feces  Final   05/24/2016 Throat  Final   05/24/2016 Throat  Final   01/20/2014 Midstream Urine  Final   10/23/2009 Blood Venipuncture Collection  Final   10/25/2008 Midstream Urine  Final   10/25/2008 Midstream Urine  Final   10/25/2008 Midstream Urine  Final   02/24/2006 Midstream Urine  Final   02/24/2006 Midstream Urine  Final   02/24/2006 Midstream Urine  Final   11/22/2005 Throat  Final   11/22/2005 Feces  Final   11/22/2005 Feces  Final   11/22/2005 Feces  Final   11/22/2005 Feces  Final         Last check of C difficile  C Diff Toxin B PCR   Date Value Ref Range Status   07/11/2017  NEG Final    Negative  Negative: Clostridium difficile target DNA sequences NOT detected, presumed   negative for Clostridium difficile toxin B or the number of bacteria present   may be below the limit of detection for the test.   FDA approved assay performed using [employer] GeneXpert real-time PCR.   A negative result does not exclude actual disease due to Clostridium difficile   and may be due to improper collection, handling and storage of the specimen or   the number of organisms in the specimen is below the detection limit of the   assay.         Quantiferon testing   Recent Labs   Lab Test 05/31/21  1130 05/28/21  1141 01/08/14  1307 01/08/14  1307   TBRSLT  --   --   --  Negative   TBAGN  --   --   --  0.00   LYMPH 6.3 3.1   < >  --     < > = values in this interval not displayed.       Infection Studies to assess Diarrhea   Recent Labs   Lab Test 07/11/17  0950 07/11/17  0948   POPRT Routine parasitology exam negative  Cryptosporidium, Cyclospora, and Microsporidia are not readily detected by this   method. A single negative specimen does not rule out parasitic infection.    --    EPCAMP  --  Not Detected   EPSALM  --  Not Detected   EPSHGL  --  Not Detected   EPVIB  --  Not Detected   EPROTA  --  Not Detected   EPNORO  --  Not Detected   EPYER  --  Not Detected   GIART Negative for Giardia lamblia specific antigen by immunoassay.  --        Virology:  Coronavirus-19 testing    Recent Labs   Lab Test 05/13/21  1500 05/04/21  1305 04/30/21  1843 04/23/21  2209 04/02/21  1056   [id number] Nasopharyngeal Nasopharyngeal Nasopharyngeal  --  Nasopharyngeal   SARSCOVRES NEGATIVE NEGATIVE NEGATIVE NEGATIVE NEGATIVE   [id number]  --   --   --   --  Nasopharyngeal   [id number]  --   --   --   --  Test received-See reflex to IDDL test SARS CoV2 (COVID-19) Virus RT-PCR       Respiratory virus (non-coronavirus-19) testing     Recent Labs   Lab Test 04/23/21 2209   INFZA Negative   INFZB Negative       CMV viral loads    Recent Labs   Lab Test 04/24/18  1803   CSPEC Plasma   CMVLOG Not Calculated       CMV viral loads    Log IU/mL of CMVQNT   Date Value Ref Range Status   04/24/2018 Not Calculated <2.1 [Log_IU]/mL Final         EBV DNA Copies/mL   Date Value Ref Range Status   04/26/2021 3,065 (A) EBVNEG^EBV DNA Not Detected [Copies]/mL Final     Comment:     Critical Value/Significant Value called to and read back by  Re Valverde, RN, 1501 4/29/21 JT     02/15/2019 7,769 (A) EBVNEG^EBV DNA Not Detected [Copies]/mL Final   10/30/2018 2,938 (A) EBVNEG^EBV DNA Not Detected [Copies]/mL Final   08/10/2018 4,494 (A) EBVNEG^EBV DNA Not Detected [Copies]/mL Final   07/13/2018 61,980 (A) EBVNEG^EBV DNA Not Detected [Copies]/mL Final   06/11/2018 8,955 (A) EBVNEG^EBV DNA Not Detected [Copies]/mL Final   04/24/2018 8,233 (A) EBVNEG^EBV DNA Not Detected [Copies]/mL Final   12/03/2005 <1000  Final     Comment:     Copies of Keyla-Barr virus DNA per 1 mL of whole blood. The lower limit of   detection for this assay and specimen type is 1,000 copies/mL.       Hepatitis B Testing   No lab results found.  Was the last Hepatitis B E antigen positive?   No results found for: HBEAGN     Hepatitis C Antibody   Date Value Ref Range Status   08/10/2018 Nonreactive NR^Nonreactive Final     Comment:     Assay performance characteristics have not been established for newborns,   infants, and children         Pathology:      Imaging:  Results for orders placed or performed during the hospital encounter of 04/23/21   CT Abdomen Pelvis w/o Contrast    Narrative    EXAMINATION: CT ABDOMEN PELVIS W/O CONTRAST, 4/23/2021 9:24 PM    TECHNIQUE:  Helical CT images from the lung bases through the  symphysis pubis were obtained without IV contrast. Contrast dose: None    COMPARISON: Chest abdomen and pelvis CTA 3/22/2021    HISTORY: Abdominal abscess/infection  suspected. Status post EVAR and  aorto uniliac graft 4/6/21    FINDINGS:    Abdomen and pelvis: Postprocedural changes of abdominal aorta EVAR  with left uni-iliac graft. The distal limb of the graft is positioned  in the left common femoral artery. The proximal end of the graft is  located above the native renal arteries The abdominal aortic aneurysm  sac measures 6.2 x 5.8 cm on image 185 of series 5. There is mild  stranding surrounding the aneurysm sac consistent with postsurgical  changes. Vascular patency not evaluated on this noncontrast study.     Unremarkable noncontrast appearance of the liver, gallbladder,  pancreas, and spleen. Nondilated common bile duct. Left adrenal  calcification. Bilateral atrophic native kidneys. 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  A small hemorrhagic/proteinaceous cyst in the mid transplant, series 3  image 49 suspected measuring 7 mm.    No small bowel obstruction. Scattered stool throughout the colon.  Appendicoliths within nondilated appendix demonstrate. No adjacent  inflammation.    The bladder and uterus are within normal limits.    Lung bases/lower chest:  There is a pericardial effusion measuring up  to 1.5 cm in thickness on image 35 of series 5. Aneurysmal lower  thoracic aorta measuring up to 4.6 cm. Atherosclerosis. The lung bases  are relatively clear. Emphysematous changes. No pleural effusion.  There may be a small Boch[place]k fat-containing hernia along the right  hemidiaphragm.    Bones and soft tissues: There is an anterior abdominal wall fluid  collection with thick walls loculations measuring 7.9 x 3.4 cm and 6.0  x 2.1 cm on image 327 of series 5, extending from the right groin  surgical site right common femoral artery along the femorofemoral  bypass graft to the left common femoral artery. There is surrounding  inflammatory stranding of the lower anterior abdominal wall and right  groin. No acute or  aggressive appearing bone lesion. Body wall edema.      Impression    IMPRESSION:   1. Large thick-walled fluid collection in the lower abdominal wall  extending from the right groin surgical site and right common femoral  artery along the femoral-femoral bypass graft to the left common  femoral artery. Surgical consultation is recommended.    2. Postprocedural changes of abdominal aortic EVAR with left uni-iliac  graft.     I have personally reviewed the examination and initial interpretation  and I agree with the findings.    ELISSA RIVAS MD   US Lower Ext Arterial Duplex Limited Bilat    Narrative    Exam: US LOWER EXT ARTERIAL DUPLEX LIMITED BILAT, 4/24/2021 8:06 AM    Indication: evaluate right groin, left groin, and fem-fem bypass    Comparison: CT 4/23/2021    Findings:   There is a complex fluid collection in the anterior soft tissues in  the lower abdomen measuring approximately 8.5 x 17.4 x 2.7, this  corresponds to the fluid collection noted on the abdominal CT from  4/23/2021. The femoral femoral bypass graft is patent with with  monophasic/biphasic waveforms with a sharp systolic upstroke. The left  external iliac artery above the graft, common femoral artery below the  graft and left proximal profunda femoral artery are patent with normal  triphasic waveforms. The right superficial femoral artery below the  anastomosis is patent with monophasic waveforms with a sharp systolic  upstroke. The right common femoral artery at the anastomosis is patent  with triphasic waveforms.      Impression    Impression:   1. Patent femoral-femoral bypass graft.  2. Large complex lower abdominal wall postoperative fluid collection  measuring approximately 8.5 x 17.4 x 2.7 cm, this was previously  visualized on CT 4/23/2021 and ultrasound 4/6/2021 however appears to  have increased in size. Consider surgical consultation.    I have personally reviewed the examination and initial interpretation  and I agree with the  findings.    MAGALIE MARS MD   XR Chest Port 1 View    Narrative    XR CHEST PORT 1 VIEW  4/25/2021 9:52 PM      HISTORY: intrap op hemodynamic changes    COMPARISON: Chest abdomen and pelvis CT 3/22/2021    FINDINGS: AP view of the chest. The cardiac silhouette is not  enlarged. Known thoracic aortic aneurysm. Emphysematous changes.  Patchy opacities in the right greater than left lung bases. Right  upper lobe opacities, consistent with scarring on prior CT. Trace left  pleural effusion. Accessory azygous lobe. No pneumothorax.      Impression    IMPRESSION:   1. Patchy right greater than left basilar opacities, atelectasis  versus pneumonia.  2. Trace left pleural effusion.    I have personally reviewed the examination and initial interpretation  and I agree with the findings.    ELISSA RIVAS MD   XR Chest Port 1 View    Narrative    Exam: XR CHEST PORT 1 VIEW, 4/30/2021 10:19 AM    Indication: PICC line placement    Comparison: 4/25/2021    Findings:   Frontal view of the chest. Right upper extremity PICC tip projects  over the low SVC. Portable EKG device projects over the left chest.  Partially visualized abdominal aortic stent graft. Stable cardiac  silhouette. Coronary artery stents. Normal variant azygos lobe. Slight  leftward tracheal deviation, similar to prior. Right upper lobe  scarring/nodularity is less apparent than on the prior and notable on  the 3/22/2021 CT. Streaky bibasilar opacities. Costophrenic angles are  collimated off the field-of-view, however there is no large pleural  effusion. No pneumothorax.      Impression    Impression:   1. Right upper extremity PICC tip projects over the low SVC.  2. Bibasilar atelectasis.    I have personally reviewed the examination and initial interpretation  and I agree with the findings.    EMILIE PRESSLEY MD   US Upper Extremity Venous Duplex Left    Narrative    EXAMINATION: US UPPER EXTREMITY VENOUS DUPLEX LEFT  5/2/2021 12:18 PM       CLINICAL  HISTORY: Assess for DVT. L arm pain, L > R swelling    COMPARISON: None        PROCEDURE COMMENTS: Ultrasound was performed of the deep venous system  of the left upper extremity using grayscale, color, and spectral  Doppler.    FINDINGS:  The internal jugular, brachiocephalic, subclavian, brachial, and  basilic veins are visualized and are patent and fully compressible.  Extensive thrombosis of the cephalic vein extending from the proximal  left upper extremity to the level of the antecubital fossa. Venous  waveforms are otherwise normal. Two hypoechoic fluid collections  likely representing hematomas measuring approximately 6.2 x 1.6 x 0.9  cm and 1.6 x 0.8 x 0.4 cm located in the ventral distal forearm.      Impression    IMPRESSION:    1. Extensive thrombosis involving the cephalic vein from the proximal  upper extremity to the level of the antecubital fossa. Small areas of  superficial hematoma in the left distal forearm as described above.  2. No deep vein thrombosis identified.    I have personally reviewed the examination and initial interpretation  and I agree with the findings.    MAGALIE MARS MD   Echocardiogram Limited    Narrative    [id number]  [id number]  [id number]  [id number]^JACOB^DAVID^JAMI     [facility],[place]  Echocardiography Laboratory  [address] [place] [id number]     Name: ERASMO MERINO  MRN: [id number]  : 1950  Study Date: 2021 09:57 AM  Age: 70 yrs  Gender: Female  Patient Location: [facility]  Reason For Study: MI  Ordering Physician: DAVID JAMES  Performed By: Sayda Pina RDCS     BSA: 1.4 m2  Height: 62 in  Weight: 101 lb  BP: 131/82 mmHg  ______________________________________________________________________________  Procedure  Limited Portable Echo Adult. Contrast Optison. Optison (NDC #2917-9574-47)  given intravenously. Patient was given 5 ml mixture of 3 ml Optison and 6 ml  saline. 4 ml  wasted.  ______________________________________________________________________________  Interpretation Summary  Moderately (EF 35-40%) reduced left ventricular function is present. There is  thinning and akinesis of the mid septal segment. Inferior wall akinesis is  present. Apical wall akinesis is present.  Trivial pericardial effusion is present.     This study was compared with the study from 2021. No significant changes  noted.  ______________________________________________________________________________  Left Ventricle  There is thinning and akinesis of the mid septal segment. Inferior wall  akinesis is present. Apical wall akinesis is present. Moderately (EF 35-40%)  reduced left ventricular function is present.     Mitral Valve  The mitral valve is normal. Mild mitral insufficiency is present.     Aortic Valve  Trileaflet aortic sclerosis without stenosis.     Tricuspid Valve  The tricuspid valve is normal. Mild tricuspid insufficiency is present.  Pulmonary artery systolic pressure cannot be assessed.     Vessels  The inferior vena cava was normal in size with preserved respiratory  variability.     Pericardium  Trivial pericardial effusion is present.     Compared to Previous Study  This study was compared with the study from 2021 . No significant changes  noted.     ______________________________________________________________________________  Report approved by: MD Kash Tomas 2021 10:32 AM     ______________________________________________________________________________      [facility]    [id number]  [place] [address]  [id number]  [id number]^JUANCHO^JAIME^MILA     [facility],[place]  Echocardiography Laboratory  [address] [place] [id number]     Name: ERASMO MERINO  MRN: [id number]  : 1950  Study Date: 2021 10:18 AM  Age: 70 yrs  Gender: Female  Patient Location: [facility]  Reason For Study: MI  Ordering  Physician: JAIME DAWKINS  Performed By: Marcio Hirsch     BSA: 1.5 m2  Height: 62 in  Weight: 105 lb  HR: 67  BP: 141/70 mmHg  ______________________________________________________________________________  Procedure  Limited Portable Echo Adult. Optison (NDC #0931-2127-92) given intravenously.  Patient was given 4 ml mixture of 3 ml Optison and 6 ml saline. 5 ml wasted.  ______________________________________________________________________________  Interpretation Summary  Moderately (EF 35-40%) reduced left ventricular function is present.  Hypokinesis of the inferior, basal anterior, basal inferoseptal and apical  walls with mid/basal septal aneurysm.  Right ventricular function, chamber size, wall motion, and thickness are  normal.  Small pericardial effusion, with a maximum diameter of 1.3 cm, adjacent to the  right atrium, with no hemodynamic significance.  A left pleural effusion is present.     This study was compared with the study from 4/24/2021. No significant changes  noted.  ______________________________________________________________________________  Left Ventricle  Moderately (EF 35-40%) reduced left ventricular function is present.  Hypokinesis of the inferior, basal anterior, basal inferoseptal and apical  walls with mid/basal septal aneurysm.     Right Ventricle  Right ventricular function, chamber size, wall motion, and thickness are  normal.     Atria  Both atria appear normal.     Mitral Valve  The mitral valve is normal. Mitral leaflet thickness is normal . Trace mitral  insufficiency is present.     Aortic Valve  Aortic valve is normal in structure and function. The aortic valve is  tricuspid.     Tricuspid Valve  The tricuspid valve is normal. Trace tricuspid insufficiency is present.     Pulmonic Valve  The pulmonic valve is normal.     Pericardium  Small pericardial effusion, with a maximum diameter of 1.3 cm, adjacent to the  right atrium, with no hemodynamic significance.      Miscellaneous  A left pleural effusion is present.     Compared to Previous Study  This study was compared with the study from 2021 . No significant changes  noted.     Attestation  I have personally viewed the imaging and agree with the interpretation and  report as documented by the fellow, Otto Wise, and/or edited by me.  ______________________________________________________________________________  Doppler Measurements & Calculations  MV E max carly: 56.3 cm/sec     ______________________________________________________________________________  Report approved by: Saúl PATEL 2021 12:23 PM         Transesophageal Echocardiogram    Narrative    [id number]  [id number]  [id number]  [id number]^MARIANA^SANDRA^CONCEPCION     [facility],[place]  Echocardiography Laboratory  [address] [place] [id number]     Name: ERASMO MERINO  MRN: [id number]  : 1950  Study Date: 2021 10:23 AM  Age: 70 yrs  Gender: Female  Patient Location: [facility]  Reason For Study: 2nd degree AV Block  Ordering Physician: SANDRA PEÑA  Performed By: Otto Wise     BSA: 1.5 m2  Height: 62 in  Weight: 106 lb  HR: 99  BP: 120/80 mmHg  Attestation  I was present during ROLY probe placement by the fellow, Otto Wise. I  personally viewed the imaging and agree with the interpretation and report as  documented by the fellow.  ______________________________________________________________________________  Interpretation Summary  No evidence of endocarditis.  ______________________________________________________________________________  Procedure  Transesophageal Echocardiogram with color and spectral Doppler performed. 3D  image acquisition, reconstruction, and real-time interpretation was performed.  I was present during ROLY probe placement by the Fellow. I personally viewed  the imaging and agree with the interpretation and report as documented by the  Fellow. Procedure  location Operating Room. Informed consent for  Transesophegeal echo obtained. ROLY Probe #62 was used during the procedure.  Sedation was administered and monitored by anesthesia. Sedation, endotracheal  intubation, and mechanical ventilation were initiated prior to the ROLY and  were monitored by anesthesia. The Transducer was inserted without difficulty .  The patient tolerated the procedure well. Complications None. The patient's  rhythm is normal sinus. Good quality two-dimensional was performed and  interpreted. Good quality color and spectral Doppler were performed and  interpreted.     Left Ventricle  Moderately (EF 35-40%) reduced left ventricular function is present. There is  thinning and akinesis of the mid septal segment. Inferior wall akinesis is  present. Apical wall akinesis is present. Left ventricular size is normal.     Right Ventricle  Right ventricular function, chamber size, wall motion, and thickness are  normal.     Atria  The left atrial appendage is normal. It is free of spontaneous echo contrast  and thrombus. The left atrial appendage Doppler velocities are normal. Both  atria appear normal. The atrial septum is intact as assessed by color  Doppler .     Mitral Valve  The mitral valve is normal. Mitral leaflet thickness is normal. Trace mitral  insufficiency is present.     Aortic Valve  The aortic valve is tricuspid. Lambl's excrescence is present. Trace aortic  insufficiency is present.     Tricuspid Valve  The tricuspid valve is normal. Trace tricuspid insufficiency is present.     Pulmonic Valve  The pulmonic valve is normal.     Vessels  The aorta root is normal. The thoracic aorta is normal. 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     Pericardium  Trivial pericardial effusion is present.     Compared to Previous Study  This study was compared with the study from 4/26/2021 . There has been no  change.      ______________________________________________________________________________  Report approved by: MD Kash Tomas 04/28/2021 12:04 PM     ______________________________________________________________________________      Cardiac Catheterization    Narrative    Conclusions  -Single vessel CAD with patent RCA stent and severe diffuse RCA vasospasm   and mild LCA/LAD/LCx vasospasm. All resolved with administration of IC   nitroglycerin.     Recommendations  -Additional 90 mg of PO ticagrelor given in CCL  -Can stop heparin  -Continuous IV nitroglycerin  -Stop BB  -Consider adding calcium channel blocker for coronary spasm cautiously   given negative ionotropic effect especially with nondihydropyridine CCBs  -Dual antiplatelet treatment with low-dose aspirin daily and 90 mg   ticagrelor twice daily as scheduled for recently placed LIUDMILA and recent   STEMI.  -Low-dose aspirin po daily lifelong.  -Bedrest per protocol/usual post procedure care with TR band  -High intensity statin for CAD   -ACE inhibitor/ARB, and aldosterone blockade for CAD/STEMI ischemic   cardiomyopathy   -Continued medical management including lifestyle modifications for CV   risk factor optimizations  -Cardiac rehabilitation       *Note: Due to a large number of results and/or encounters for the requested time period, some results have not been displayed. A complete set of results can be found in Results Review.

## 2021-06-07 DIAGNOSIS — I73.9 PERIPHERAL ARTERY DISEASE (H): ICD-10-CM

## 2021-06-07 DIAGNOSIS — M81.0 AGE-RELATED OSTEOPOROSIS WITHOUT CURRENT PATHOLOGICAL FRACTURE: ICD-10-CM

## 2021-06-07 DIAGNOSIS — I71.40 ABDOMINAL AORTIC ANEURYSM (AAA) WITHOUT RUPTURE (H): Primary | ICD-10-CM

## 2021-06-07 LAB
AST SERPL W P-5'-P-CCNC: 12 U/L (ref 0–45)
BASOPHILS # BLD AUTO: 0 10E9/L (ref 0–0.2)
BASOPHILS NFR BLD AUTO: 0.4 %
BUN SERPL-MCNC: 25 MG/DL (ref 7–30)
CREAT SERPL-MCNC: 1.14 MG/DL (ref 0.52–1.04)
CRP SERPL-MCNC: 26.5 MG/L (ref 0–8)
DIFFERENTIAL METHOD BLD: ABNORMAL
EOSINOPHIL # BLD AUTO: 0.2 10E9/L (ref 0–0.7)
EOSINOPHIL NFR BLD AUTO: 2.1 %
ERYTHROCYTE [DISTWIDTH] IN BLOOD BY AUTOMATED COUNT: 17.8 % (ref 10–15)
ERYTHROCYTE [SEDIMENTATION RATE] IN BLOOD BY WESTERGREN METHOD: 20 MM/H (ref 0–30)
GFR SERPL CREATININE-BSD FRML MDRD: 49 ML/MIN/{1.73_M2}
HCT VFR BLD AUTO: 31.8 % (ref 35–47)
HGB BLD-MCNC: 9.8 G/DL (ref 11.7–15.7)
IMM GRANULOCYTES # BLD: 0 10E9/L (ref 0–0.4)
IMM GRANULOCYTES NFR BLD: 0.5 %
LYMPHOCYTES # BLD AUTO: 0.3 10E9/L (ref 0.8–5.3)
LYMPHOCYTES NFR BLD AUTO: 3.6 %
MCH RBC QN AUTO: 28.4 PG (ref 26.5–33)
MCHC RBC AUTO-ENTMCNC: 30.8 G/DL (ref 31.5–36.5)
MCV RBC AUTO: 92 FL (ref 78–100)
MONOCYTES # BLD AUTO: 0.7 10E9/L (ref 0–1.3)
MONOCYTES NFR BLD AUTO: 8.4 %
NEUTROPHILS # BLD AUTO: 6.8 10E9/L (ref 1.6–8.3)
NEUTROPHILS NFR BLD AUTO: 85 %
NRBC # BLD AUTO: 0 10*3/UL
NRBC BLD AUTO-RTO: 0 /100
PLATELET # BLD AUTO: 309 10E9/L (ref 150–450)
RBC # BLD AUTO: 3.45 10E12/L (ref 3.8–5.2)
WBC # BLD AUTO: 8 10E9/L (ref 4–11)

## 2021-06-07 PROCEDURE — 82565 ASSAY OF CREATININE: CPT | Performed by: INTERNAL MEDICINE

## 2021-06-07 PROCEDURE — 84520 ASSAY OF UREA NITROGEN: CPT | Performed by: INTERNAL MEDICINE

## 2021-06-07 PROCEDURE — 84450 TRANSFERASE (AST) (SGOT): CPT | Performed by: INTERNAL MEDICINE

## 2021-06-07 PROCEDURE — 85025 COMPLETE CBC W/AUTO DIFF WBC: CPT | Performed by: INTERNAL MEDICINE

## 2021-06-07 PROCEDURE — 85652 RBC SED RATE AUTOMATED: CPT | Performed by: INTERNAL MEDICINE

## 2021-06-07 PROCEDURE — 86140 C-REACTIVE PROTEIN: CPT | Performed by: INTERNAL MEDICINE

## 2021-06-07 RX ORDER — HEPARIN SODIUM (PORCINE) LOCK FLUSH IV SOLN 100 UNIT/ML 100 UNIT/ML
5 SOLUTION INTRAVENOUS
Status: CANCELLED | OUTPATIENT
Start: 2021-06-07

## 2021-06-07 RX ORDER — HEPARIN SODIUM,PORCINE 10 UNIT/ML
5 VIAL (ML) INTRAVENOUS
Status: CANCELLED | OUTPATIENT
Start: 2021-06-07

## 2021-06-07 NOTE — PROGRESS NOTES
This is a recent snapshot of the patient's [facility] medical record.  For current drug dose and complete information and questions, call [phone] or In Basket pool, fv home infusion ([id number])  CSN Number:  [id number]

## 2021-06-08 NOTE — PROGRESS NOTES
This is a recent snapshot of the patient's [facility] medical record.  For current drug dose and complete information and questions, call [phone] or In Basket pool, fv home infusion ([id number])  CSN Number:  [id number]

## 2021-06-09 ENCOUNTER — MEDICAL CORRESPONDENCE (OUTPATIENT)
Dept: HEALTH INFORMATION MANAGEMENT | Facility: CLINIC | Age: 71
End: 2021-06-09

## 2021-06-09 ENCOUNTER — TELEPHONE (OUTPATIENT)
Dept: VASCULAR SURGERY | Facility: CLINIC | Age: 71
End: 2021-06-09

## 2021-06-09 NOTE — TELEPHONE ENCOUNTER
[facility] Call Center    Phone Message    May a detailed message be left on voicemail: yes     Reason for Call: Order(s): Home Care Orders: Skilled Nursing:    RN seeking verbal order.  Currently, Pt on wound vac  RN notes wounds are small and have minimal drainage    Requests order: DC wound vac and start dressing changes 3x/week    Action Taken: Message routed to:  [facility] ([facility] Vascular    Travel Screening: Not Applicable       Please return call to Aleks to advise if Order can be provided.

## 2021-06-10 ENCOUNTER — TELEPHONE (OUTPATIENT)
Dept: FAMILY MEDICINE | Facility: CLINIC | Age: 71
End: 2021-06-10

## 2021-06-10 NOTE — TELEPHONE ENCOUNTER
Reason for Call:  Home Health Care    Eva with [facility] called regarding (reason for call): verbal orders    Orders are needed for this patient. Yes    PT: N/A    OT: N/A    Skilled Nursing: Extend 3 times a week for 60 days    Pt Provider: Michelle    Phone Number Homecare Nurse can be reached at: [phone]    Can we leave a detailed message on this number? YES    Phone number patient can be reached at: Other phone number:  N/A*    Best Time: Today    Call taken on 6/10/2021 at 9:53 AM by Ana Laura Elaine

## 2021-06-10 NOTE — TELEPHONE ENCOUNTER
Reason for Call:  Form, our goal is to have forms completed with 72 hours, however, some forms may require a visit or additional information.    Type of letter, form or note:  orders for rx     Who is the form from?: [facility] (if other please explain)    Where did the form come from: form was faxed in    What clinic location was the form placed at?: [facility]    Where the form was placed: teresa  Box/Folder    What number is listed as a contact on the form?:  Fax [phone]       Additional comments:     Call taken on 6/10/2021 at 12:36 PM by Hitesh Mcgill

## 2021-06-11 ENCOUNTER — VIRTUAL VISIT (OUTPATIENT)
Dept: VASCULAR SURGERY | Facility: CLINIC | Age: 71
End: 2021-06-11
Payer: COMMERCIAL

## 2021-06-11 DIAGNOSIS — I25.10 CORONARY ARTERY DISEASE INVOLVING NATIVE HEART WITHOUT ANGINA PECTORIS, UNSPECIFIED VESSEL OR LESION TYPE: Primary | ICD-10-CM

## 2021-06-11 DIAGNOSIS — I71.40 ABDOMINAL AORTIC ANEURYSM (AAA) WITHOUT RUPTURE (H): ICD-10-CM

## 2021-06-11 DIAGNOSIS — R06.02 SOB (SHORTNESS OF BREATH): ICD-10-CM

## 2021-06-11 DIAGNOSIS — T81.49XA WOUND INFECTION AFTER SURGERY: ICD-10-CM

## 2021-06-11 PROCEDURE — 99024 POSTOP FOLLOW-UP VISIT: CPT | Mod: 95 | Performed by: NURSE PRACTITIONER

## 2021-06-11 ASSESSMENT — PAIN SCALES - GENERAL: PAINLEVEL: NO PAIN (0)

## 2021-06-11 NOTE — PATIENT INSTRUCTIONS
-Urgent cardiology referral placed.  I would like her to be evaluated within the next week. She was advised if her symptoms worsen, she should report to the ED.  -Continue wet-to-dry dressing changes as prescribed, please send photos at your next dressing change via [employer].  -Follow up with Dr. Ferrara in 2 weeks as previously scheduled with imaging prior to the appointment.    With questions, concerns, or to request an appointment, please call either:    Anabelle Deleon, Care Coordinator RN, Vascular Surgery  [phone]    Vascular Call Center  [phone]    To contact someone after 5 pm, on a weekend, or on a Holiday, please call:  [facility]  [phone], option 4 to have the attending physician for Vascular Surgery Service paged.

## 2021-06-11 NOTE — PROGRESS NOTES
Vascular Surgery Follow-up:      Maribel is a 70 year old who is being evaluated via a billable video visit.      How would you like to obtain your AVS? MyChart  If the video visit is dropped, the invitation should be resent by: Other e-mail: my chart connect  Will anyone else be joining your video visit? No      Video Start Time: 11:15am    Assessment & Plan   Problem List Items Addressed This Visit     Abdominal aortic aneurysm (AAA) without rupture (H)      Other Visit Diagnoses     Coronary artery disease involving native heart without angina pectoris, unspecified vessel or lesion type    -  Primary    Relevant Orders    CARDIOLOGY EVAL ADULT REFERRAL    Wound infection after surgery        SOB (shortness of breath)        Relevant Orders    CARDIOLOGY EVAL ADULT REFERRAL         69 yo F s/p EVAR AUI and left to right femoral to femoral bypass graft for 5.9 cm AAA. Initial post-op course was complicated by inferior STEMI requiring emergent cardiac catheterization and PCI to RCA.  She was readmitted to the hospital on 4/23/2021 for infection of her fem-fem bypass which was subsequently explanted and replaced with a homograft and closed with bilateral sartorius muscle flaps and placement of wound vacs to bilateral groins.  Post-operatively she experienced EKG changes and was found to have diffuse vasospasms that resolved.  She was discharged to TCU where our service was following.  She has since been discharged and her wound vacs have been removed.      Overall, she is increasing her activity in the home, but continues to feel quite deconditioned.  I was concerned about her shortness of breath with conversation which she says has been present since her hospitalization.  More concerning is her inability to lay flat at night x 1 week due to dyspnea.  I discussed with her that I am concerned about her cardiac function and unfortunately ongoing cardiology follow-up was not arranged.    -Urgent cardiology referral  placed.  I would like her to be evaluated within the next week. She was advised if her symptoms worsen, she should report to the ED.  -Continue wet-to-dry dressing changes as prescribed, please send photos at your next dressing change via [employer].  -Follow up with Dr. Ferrara in 2 weeks as previously scheduled with imaging prior to the appointment.      Jessica Bunn NP  [facility] VASCULAR CLINIC ROD López is a 70 year old who presents for the following health issues:      [facility]     Maribel Yang is a 70 year old female with PMH significant for CKD s/p transplant in 2003 and lung LCC in remission since 2014 who underwent fem-fem bypass and EVAR on 4/6/2021 with Dr. Ferrara.  Her post-operative course was complicated by STEMI and she underwent PCI of the RCA on 4/7/2021.  She was discharged but readmitted shortly after with sepsi and infection of the right groin incision.  She underwent excision of the fem-fem bypass, replacement with homograft, bilateral sartorius muscle flaps, and placement of a wound vac.  Post-operatively, she had EKG changes and underwent coronary angiogram which revealed diffuse coronary vasospasm.  She was admitted to [facility] for ongoing physical rehab, IV antibiotics, and wound care.  She was discharged on 5/23 and has returned home.  She presents today for routine follow up regarding her wound.    Overall, Ms. Yang feels like she is gradually improving and that her wounds are progressing nicely. The vacs have been removed and she has finished antibiotics.  Unfortunately, her wounds are covered at this visit and she did not take photos.  She plans to get photos this weekend and send them in via [employer].  She has been ambulating in the home and doing her stairs 3 times a day and trying to increase chores like making the bed.  She reports 1 week onset of dyspnea at night that has been concerning to her.  She reports she is also feeling more short of breath  with conversation today, but relates that to the heat and having the windows open.  She denies chest pain, abdominal pain, nausea, vomiting, or swelling.  She feels like the conversational dyspnea is normal for her since the hospital, but that the nocturnal dyspnea is new.  They do turn the air conditioning on at night and this has not helped, but does improve her symptoms during the daytime hours.  She denies symptoms consistent with claudication or rest pain.  Denies recent fevers or chills.    She reports she does not have any follow-up with cardiology and that nothing was arranged after discharge.  She has followed up with the Transplant ID clinic on 6/3 who made no mention of her shortness of breath.  She has been followed by home health nursing who will be visiting this weekend.    Review of Systems   Constitutional, HEENT, cardiovascular, pulmonary, gi and gu systems are negative, except as otherwise noted.      Objective    Vitals - Patient Reported  Pain Score: No Pain (0)        Physical Exam   GENERAL: Alert, calm  EYES: Eyes grossly normal to inspection.  No discharge or erythema, or obvious scleral/conjunctival abnormalities.  RESP: No audible wheeze, cough, or visible cyanosis.  Mild increase in work of breathing with prolonged conversation.   SKIN: Visible skin clear. No significant rash, abnormal pigmentation or lesions.  NEURO: Cranial nerves grossly intact.  Mentation and speech appropriate for age.  PSYCH: Mentation appears normal, affect normal/bright, judgement and insight intact, normal speech and appearance well-groomed.  EXTREMITIES: Bilateral groins covered by dressings that appear clean and intact.          Video-Visit Details    Type of service:  Video Visit    Video End Time:11:30am    Originating Location (pt. Location): Home    Distant Location (provider location):  [facility] VASCULAR CLINIC [place]     Platform used for Video Visit: [employer]

## 2021-06-11 NOTE — Clinical Note
Maribel Gustafson is going to send photos this weekend. Unfortunately she forgot to take them before her video visit today and did not want to take her dressings down.  I think she knows how to send photos, but could you send her the instructions just in case?    I also copied her PCP to the chart as I am concerned about her sudden difficulty breathing.  She did not have any cardiology follow-up arranged following discharge from TCU.    Thanks,  Jessica

## 2021-06-11 NOTE — LETTER
6/11/2021       RE: Maribel Yang  [address]  [place] [address]     Dear Colleague,    Thank you for referring your patient, Maribel Yang, to the [facility] [place] at [facility]. Please see a copy of my visit note below.    Vascular Surgery Follow-up:      Maribel is a 70 year old who is being evaluated via a billable video visit.      How would you like to obtain your AVS? MyChart  If the video visit is dropped, the invitation should be resent by: Other e-mail: my chart connect  Will anyone else be joining your video visit? No      Video Start Time: 11:15am    Assessment & Plan   Problem List Items Addressed This Visit     Abdominal aortic aneurysm (AAA) without rupture (H)      Other Visit Diagnoses     Coronary artery disease involving native heart without angina pectoris, unspecified vessel or lesion type    -  Primary    Relevant Orders    CARDIOLOGY EVAL ADULT REFERRAL    Wound infection after surgery        SOB (shortness of breath)        Relevant Orders    CARDIOLOGY EVAL ADULT REFERRAL         71 yo F s/p EVAR AUI and left to right femoral to femoral bypass graft for 5.9 cm AAA. Initial post-op course was complicated by inferior STEMI requiring emergent cardiac catheterization and PCI to RCA.  She was readmitted to the hospital on 4/23/2021 for infection of her fem-fem bypass which was subsequently explanted and replaced with a homograft and closed with bilateral sartorius muscle flaps and placement of wound vacs to bilateral groins.  Post-operatively she experienced EKG changes and was found to have diffuse vasospasms that resolved.  She was discharged to TCU where our service was following.  She has since been discharged and her wound vacs have been removed.      Overall, she is increasing her activity in the home, but continues to feel quite deconditioned.  I was concerned about her shortness of breath with  conversation which she says has been present since her hospitalization.  More concerning is her inability to lay flat at night x 1 week due to dyspnea.  I discussed with her that I am concerned about her cardiac function and unfortunately ongoing cardiology follow-up was not arranged.    -Urgent cardiology referral placed.  I would like her to be evaluated within the next week. She was advised if her symptoms worsen, she should report to the ED.  -Continue wet-to-dry dressing changes as prescribed, please send photos at your next dressing change via [employer].  -Follow up with Dr. Ferrara in 2 weeks as previously scheduled with imaging prior to the appointment.      Jessica Bunn NP  [facility] VASCULAR CLINIC ROD López is a 70 year old who presents for the following health issues:      [facility]     Maribel Yang is a 70 year old female with PMH significant for CKD s/p transplant in 2003 and lung LCC in remission since 2014 who underwent fem-fem bypass and EVAR on 4/6/2021 with Dr. Ferrara.  Her post-operative course was complicated by STEMI and she underwent PCI of the RCA on 4/7/2021.  She was discharged but readmitted shortly after with sepsi and infection of the right groin incision.  She underwent excision of the fem-fem bypass, replacement with homograft, bilateral sartorius muscle flaps, and placement of a wound vac.  Post-operatively, she had EKG changes and underwent coronary angiogram which revealed diffuse coronary vasospasm.  She was admitted to [facility] for ongoing physical rehab, IV antibiotics, and wound care.  She was discharged on 5/23 and has returned home.  She presents today for routine follow up regarding her wound.    Overall, Ms. Yang feels like she is gradually improving and that her wounds are progressing nicely. The vacs have been removed and she has finished antibiotics.  Unfortunately, her wounds are covered at this visit and she did not take photos.  She  plans to get photos this weekend and send them in via [employer].  She has been ambulating in the home and doing her stairs 3 times a day and trying to increase chores like making the bed.  She reports 1 week onset of dyspnea at night that has been concerning to her.  She reports she is also feeling more short of breath with conversation today, but relates that to the heat and having the windows open.  She denies chest pain, abdominal pain, nausea, vomiting, or swelling.  She feels like the conversational dyspnea is normal for her since the hospital, but that the nocturnal dyspnea is new.  They do turn the air conditioning on at night and this has not helped, but does improve her symptoms during the daytime hours.  She denies symptoms consistent with claudication or rest pain.  Denies recent fevers or chills.    She reports she does not have any follow-up with cardiology and that nothing was arranged after discharge.  She has followed up with the Transplant ID clinic on 6/3 who made no mention of her shortness of breath.  She has been followed by home health nursing who will be visiting this weekend.    Review of Systems   Constitutional, HEENT, cardiovascular, pulmonary, gi and gu systems are negative, except as otherwise noted.      Objective    Vitals - Patient Reported  Pain Score: No Pain (0)        Physical Exam   GENERAL: Alert, calm  EYES: Eyes grossly normal to inspection.  No discharge or erythema, or obvious scleral/conjunctival abnormalities.  RESP: No audible wheeze, cough, or visible cyanosis.  Mild increase in work of breathing with prolonged conversation.   SKIN: Visible skin clear. No significant rash, abnormal pigmentation or lesions.  NEURO: Cranial nerves grossly intact.  Mentation and speech appropriate for age.  PSYCH: Mentation appears normal, affect normal/bright, judgement and insight intact, normal speech and appearance well-groomed.  EXTREMITIES: Bilateral groins covered by dressings that  appear clean and intact.          Video-Visit Details    Type of service:  Video Visit    Video End Time:11:30am    Originating Location (pt. Location): Home    Distant Location (provider location):  [facility] VASCULAR CLIN[facility] [place]     Platform used for Video Visit: [employer]        Again, thank you for allowing me to participate in the care of your patient.      Sincerely,    Jessica Bunn NP

## 2021-06-11 NOTE — NURSING NOTE
Vascular Rooming Note     Maribel Yang's goals for this visit include:   Chief Complaint   Patient presents with     CLEMENT López, is participating in a virtual visit today for a wound check follow up, condition seems to be stable but experiencing sob no chest pains, as reported by patient.     Kainna Erickson LPN

## 2021-06-13 ENCOUNTER — HOSPITAL ENCOUNTER (EMERGENCY)
Facility: CLINIC | Age: 71
Discharge: HOME | End: 2021-06-13
Attending: EMERGENCY MEDICINE | Admitting: EMERGENCY MEDICINE
Payer: COMMERCIAL

## 2021-06-13 ENCOUNTER — MEDICAL CORRESPONDENCE (OUTPATIENT)
Dept: HEALTH INFORMATION MANAGEMENT | Facility: CLINIC | Age: 71
End: 2021-06-13

## 2021-06-13 ENCOUNTER — APPOINTMENT (OUTPATIENT)
Dept: GENERAL RADIOLOGY | Facility: CLINIC | Age: 71
End: 2021-06-13
Attending: EMERGENCY MEDICINE
Payer: COMMERCIAL

## 2021-06-13 ENCOUNTER — NURSE TRIAGE (OUTPATIENT)
Dept: NURSING | Facility: CLINIC | Age: 71
End: 2021-06-13

## 2021-06-13 VITALS
HEART RATE: 80 BPM | RESPIRATION RATE: 16 BRPM | SYSTOLIC BLOOD PRESSURE: 146 MMHG | TEMPERATURE: 98.9 F | DIASTOLIC BLOOD PRESSURE: 90 MMHG | OXYGEN SATURATION: 96 %

## 2021-06-13 DIAGNOSIS — T81.89XA NON-HEALING SURGICAL WOUND: ICD-10-CM

## 2021-06-13 DIAGNOSIS — I71.40 ABDOMINAL AORTIC ANEURYSM (AAA) WITHOUT RUPTURE (H): ICD-10-CM

## 2021-06-13 DIAGNOSIS — Z94.0 KIDNEY REPLACED BY TRANSPLANT: ICD-10-CM

## 2021-06-13 DIAGNOSIS — T14.8XXA WOUND DRAINAGE: ICD-10-CM

## 2021-06-13 LAB
ALBUMIN SERPL-MCNC: 2.8 G/DL (ref 3.4–5)
ALBUMIN UR-MCNC: 70 MG/DL
ALP SERPL-CCNC: 148 U/L (ref 40–150)
ALT SERPL W P-5'-P-CCNC: 8 U/L (ref 0–50)
ANION GAP SERPL CALCULATED.3IONS-SCNC: 6 MMOL/L (ref 3–14)
APPEARANCE UR: CLEAR
AST SERPL W P-5'-P-CCNC: 8 U/L (ref 0–45)
BASOPHILS # BLD AUTO: 0 10E9/L (ref 0–0.2)
BASOPHILS NFR BLD AUTO: 0.6 %
BILIRUB SERPL-MCNC: 0.3 MG/DL (ref 0.2–1.3)
BILIRUB UR QL STRIP: NEGATIVE
BUN SERPL-MCNC: 24 MG/DL (ref 7–30)
CALCIUM SERPL-MCNC: 9.6 MG/DL (ref 8.5–10.1)
CHLORIDE SERPL-SCNC: 105 MMOL/L (ref 94–109)
CO2 SERPL-SCNC: 26 MMOL/L (ref 20–32)
COLOR UR AUTO: ABNORMAL
CREAT SERPL-MCNC: 1.22 MG/DL (ref 0.52–1.04)
DIFFERENTIAL METHOD BLD: ABNORMAL
EOSINOPHIL # BLD AUTO: 0 10E9/L (ref 0–0.7)
EOSINOPHIL NFR BLD AUTO: 0.6 %
ERYTHROCYTE [DISTWIDTH] IN BLOOD BY AUTOMATED COUNT: 17.2 % (ref 10–15)
GFR SERPL CREATININE-BSD FRML MDRD: 45 ML/MIN/{1.73_M2}
GLUCOSE SERPL-MCNC: 117 MG/DL (ref 70–99)
GLUCOSE UR STRIP-MCNC: NEGATIVE MG/DL
GRAM STN SPEC: NORMAL
HCT VFR BLD AUTO: 33.7 % (ref 35–47)
HGB BLD-MCNC: 10.2 G/DL (ref 11.7–15.7)
HGB UR QL STRIP: NEGATIVE
IMM GRANULOCYTES # BLD: 0 10E9/L (ref 0–0.4)
IMM GRANULOCYTES NFR BLD: 0.2 %
KETONES UR STRIP-MCNC: NEGATIVE MG/DL
LACTATE BLD-SCNC: 1.3 MMOL/L (ref 0.7–2)
LEUKOCYTE ESTERASE UR QL STRIP: NEGATIVE
LYMPHOCYTES # BLD AUTO: 0.3 10E9/L (ref 0.8–5.3)
LYMPHOCYTES NFR BLD AUTO: 4.3 %
Lab: NORMAL
MCH RBC QN AUTO: 28.3 PG (ref 26.5–33)
MCHC RBC AUTO-ENTMCNC: 30.3 G/DL (ref 31.5–36.5)
MCV RBC AUTO: 93 FL (ref 78–100)
MONOCYTES # BLD AUTO: 0.4 10E9/L (ref 0–1.3)
MONOCYTES NFR BLD AUTO: 7 %
NEUTROPHILS # BLD AUTO: 5.5 10E9/L (ref 1.6–8.3)
NEUTROPHILS NFR BLD AUTO: 87.3 %
NITRATE UR QL: NEGATIVE
NRBC # BLD AUTO: 0 10*3/UL
NRBC BLD AUTO-RTO: 0 /100
PH UR STRIP: 5.5 PH (ref 5–7)
PLATELET # BLD AUTO: 410 10E9/L (ref 150–450)
POTASSIUM SERPL-SCNC: 4.1 MMOL/L (ref 3.4–5.3)
PROT SERPL-MCNC: 7.2 G/DL (ref 6.8–8.8)
RBC # BLD AUTO: 3.61 10E12/L (ref 3.8–5.2)
RBC #/AREA URNS AUTO: 1 /HPF (ref 0–2)
SODIUM SERPL-SCNC: 137 MMOL/L (ref 133–144)
SOURCE: ABNORMAL
SP GR UR STRIP: 1.01 (ref 1–1.03)
SPECIMEN SOURCE: NORMAL
SQUAMOUS #/AREA URNS AUTO: <1 /HPF (ref 0–1)
UROBILINOGEN UR STRIP-MCNC: NORMAL MG/DL (ref 0–2)
WBC # BLD AUTO: 6.3 10E9/L (ref 4–11)
WBC #/AREA URNS AUTO: 3 /HPF (ref 0–5)

## 2021-06-13 PROCEDURE — 87040 BLOOD CULTURE FOR BACTERIA: CPT | Performed by: EMERGENCY MEDICINE

## 2021-06-13 PROCEDURE — 96360 HYDRATION IV INFUSION INIT: CPT

## 2021-06-13 PROCEDURE — 36415 COLL VENOUS BLD VENIPUNCTURE: CPT

## 2021-06-13 PROCEDURE — 85025 COMPLETE CBC W/AUTO DIFF WBC: CPT | Performed by: FAMILY MEDICINE

## 2021-06-13 PROCEDURE — 87070 CULTURE OTHR SPECIMN AEROBIC: CPT | Performed by: EMERGENCY MEDICINE

## 2021-06-13 PROCEDURE — 99284 EMERGENCY DEPT VISIT MOD MDM: CPT | Performed by: EMERGENCY MEDICINE

## 2021-06-13 PROCEDURE — 87086 URINE CULTURE/COLONY COUNT: CPT | Performed by: EMERGENCY MEDICINE

## 2021-06-13 PROCEDURE — 83605 ASSAY OF LACTIC ACID: CPT | Performed by: FAMILY MEDICINE

## 2021-06-13 PROCEDURE — 87205 SMEAR GRAM STAIN: CPT | Performed by: EMERGENCY MEDICINE

## 2021-06-13 PROCEDURE — 81001 URINALYSIS AUTO W/SCOPE: CPT | Performed by: EMERGENCY MEDICINE

## 2021-06-13 PROCEDURE — 71046 X-RAY EXAM CHEST 2 VIEWS: CPT

## 2021-06-13 PROCEDURE — 80053 COMPREHEN METABOLIC PANEL: CPT | Performed by: FAMILY MEDICINE

## 2021-06-13 PROCEDURE — 258N000003 HC RX IP 258 OP 636: Performed by: EMERGENCY MEDICINE

## 2021-06-13 PROCEDURE — 99284 EMERGENCY DEPT VISIT MOD MDM: CPT | Mod: 25

## 2021-06-13 PROCEDURE — 71046 X-RAY EXAM CHEST 2 VIEWS: CPT | Mod: 26 | Performed by: RADIOLOGY

## 2021-06-13 RX ADMIN — SODIUM CHLORIDE 500 ML: 9 INJECTION, SOLUTION INTRAVENOUS at 18:06

## 2021-06-13 ASSESSMENT — ENCOUNTER SYMPTOMS
FEVER: 0
SHORTNESS OF BREATH: 1
DIFFICULTY URINATING: 0
WEAKNESS: 1
DYSURIA: 0
COLOR CHANGE: 1
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
## 2021-06-13 NOTE — DISCHARGE INSTRUCTIONS
Your wound is well appearing at this time with no signs of infection.     Your blood work is normal.      Please make an appointment to follow up with Your Primary Care Provider and Surgery - General Clinic(phone: [phone]) in 3-5 days even if entirely better.    Return to the ER if any other other problems.

## 2021-06-13 NOTE — ED TRIAGE NOTES
"Pt had \"suction tubes in groin from AAA surgery, tubes were removed Thursday.\"  Bandages were soaked green yesterday. Home nurse told pt to change bandages, told pt they look infected. Was told to come to ER.   VSS    "

## 2021-06-13 NOTE — TELEPHONE ENCOUNTER
Alyson RN with [facility] is calling and is at patients home and is there for wound care.  Wound is located on abdominal groin area both sides.  There has been green drainage for two days.  Alyson is concerned about her wound and she is using Aquacel ag and dressing needs to be changed three times a week.  Alyson feels that wound is deteriorating.  Denies fever currently.  Alyson is wanting to know if a culture should be done and reassess tomorrow.  Maribel is having shortness of breath but RN states that it is humid in the home.  Alyson is requesting to speak with Abena Yeh.  Please phone Alyson [phone].      COVID 19 Nurse Triage Plan/Patient Instructions    Please be aware that novel coronavirus (COVID-19) may be circulating in the community. If you develop symptoms such as fever, cough, or SOB or if you have concerns about the presence of another infection including coronavirus (COVID-19), please contact your health care provider or visit [email or web address].     Disposition/Instructions    Home care recommended. Follow home care protocol based instructions.    Thank you for taking steps to prevent the spread of this virus.  o Limit your contact with others.  o Wear a simple mask to cover your cough.  o Wash your hands well and often.    Resources    [facility]: About COVID-19: [email or web address]    CDC: What to Do If You're Sick: www.cdc.gov/coronavirus/2019-ncov/about/steps-when-sick.html    CDC: Ending Home Isolation: www.cdc.gov/coronavirus/2019-ncov/hcp/disposition-in-home-patients.html     CDC: Caring for Someone: www.cdc.gov/coronavirus/2019-ncov/if-you-are-sick/care-for-someone.html     [facility]: Interim Guidance for Hospital Discharge to Home: www.health.[facility]/diseases/coronavirus/hcp/hospdischarge.pdf    [facility] clinical trials (COVID-19 research studies): [email or web address]     Below are the COVID-19 hotlines at the [place]  Department of Health ([facility]). Interpreters are available.   o For health questions: Call 530-335-7518 or 1-390.435.6741 (7 a.m. to 7 p.m.)  o For questions about schools and childcare: Call 497-575-3986 or 1-189.718.7354 (7 a.m. to 7 p.m.)                       Reason for Disposition    [1] Caller requesting nonurgent health information AND [2] PCP's office is the best resource    Additional Information    Negative: RN needs further essential information from caller in order to complete triage    Negative: [1] Pre-operative urgent question about upcoming surgery or procedure AND [2] triager can't answer question    Negative: [1] Pre-operative non-urgent question about upcoming surgery or procedure AND [2] triager can't answer question    Negative: Requesting regular office appointment    Negative: Requesting referral to a specialist    Protocols used: INFORMATION ONLY CALL - NO TRIAGE-P-

## 2021-06-13 NOTE — ED PROVIDER NOTES
"    [facility] EMERGENCY DEPARTMENT ([facility])  6/13/21  History     Chief Complaint   Patient presents with     Post-op Problem     The history is provided by the patient and medical records.     Maribel Yang is a 70 year old female with a past medical history significant for CKD (s/p kidney transplant-2004), CAD (s/p RCA stent-4/7/2021, AAA (recent EVAR with left right femoral bypass graft), and recent hospitalization for sepsis 2/2 right femoral groin access site and perigraft abscess (s/p washout, and sartorius muscle flap and redo fem-fem bypass with cadaveric artery-4/25/2021) c/b recent STEMI 2/2 CAD (s/p PCI-RCA stent-4/7/2021, EF 35-40% per echo on 4/26/2021, currently on Brilinta, aspirin, and Imdur),  SCC of right lung (in remission since 2014) who presents to the Emergency Department for evaluation of increased redness, and drainage to her recent surgical site in the groin.  Patient reports that she recently got her wound vacs out last Thursday (6/10/2021).  This was subsequently bandaged, but the patient reports that she has had an abnormal \"green\" drainage coming out of the homograft in the right groin.  Patient reports that she had tried to get a wound nurse to change the bandage yesterday, but states that they could not find a nurse to come out to her residence.  The patient and her daughter subsequently change the bandage yesterday, and began to notice the increasing redness.  When they went to change the bandages earlier today, they noted more redness to the site, as well as the green drainage.  Patient also reports that she had recently gotten her PICC line out last Friday (6/11), and is not currently on any antibiotics.  Patient denies any fevers, but reports she was fairly shaky yesterday.  She is not sure if this was secondary to feeling warm or anxiety about stressful life events.  Patient denies any chest pain.  She does report that she has felt like she has been unable to " sleep at night for the past couple of days.  Patient reports she has been urinating okay.  She states she is fully vaccinated from COVID-19, denies previous COVID-19 infection.    Per review of the patient's medical record, they were recently hospitalized at [facility] vascular surgery service from 4/23/2021-5/2/2021, and transition to a TCU from 5/2/2021-5/23/2021.  Prior to this hospitalization, patient underwent endovascular repair of AAA approximately 2 weeks prior with an aorto uniiliac device to the left iliac system and a femoral-femoral crossover graft.  This was noted to be complicated by postop MI requiring emergency cardiac catheterization and LAD stenting.  She was subsequently placed on Brilinta afterwards.  After this hospitalization she returned home and noticed draining right groin wound infection and went to the ER on 5/2.  CT scan was done without contrast that showed a large fluid collection tracking along the entire length of the femoral-femoral graft and with fluid around both anastomoses in the groins.  Upon hospitalization, patient underwent right groin exploration and packing of the wound.  The following day, patient underwent exploration of the femoral to femoral bypass graft and placement of a femoral-femoral bypass cadaveric superficial femoral artery graft in the retropubic fashion with bilateral sartorius muscle flaps and placement of negative pressure wound VAC dressings.  Postoperatively, patient had ST elevations concerning for STEMI.  She subsequently underwent cardiac catheterization that showed a patent RCA stent and vasospasms of the coronaries.  She was subsequently admitted to the CVICU and placed on a nitroglycerin gtt.  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 She was then subsequently placed on cefazolin without incident.  Anticipated duration of cefazolin treatment was through 6/8/2021.   Patient was subsequently discharged to [facility] from 5/2/2021-5/23/2021 where she continued to recover.    Limited echocardiogram-4/26/2021  Interpretation Summary:  Moderately (EF 35-40%) reduced left ventricular function is present.  Hypokinesis of the inferior, basal anterior, basal inferoseptal and apical  walls with mid/basal septal aneurysm.  Right ventricular function, chamber size, wall motion, and thickness are  normal.  Small pericardial effusion, with a maximum diameter of 1.3 cm, adjacent to the  right atrium, with no hemodynamic significance.  A left pleural effusion is present.  This study was compared with the study from 4/24/2021. No significant changes  noted.    I have reviewed the Medications, Allergies, Past Medical and Surgical History, and Social History in the [employer] system.  PAST MEDICAL HISTORY:   Past Medical History:   Diagnosis Date     Abnormal coagulation profile     p [id number] heterozygote      Age-related osteoporosis without current pathological fracture 6/22/2019     Anemia      Antiplatelet or antithrombotic long-term use      ASCUS with positive high risk HPV 2007, 2015    + HPV 56, 54,& 6, colp - TAL III, Leep =TAL II     Basal cell carcinoma      Depressive disorder July 2015     Hypertension      Immunosuppressed status (H)     due meds     Kidney replaced by transplant 9/04    Living donor recipient,  Rejection 7/2005     LSIL (low grade squamous intraepithelial lesion) on Pap smear 4/2013    +HPV 33 or 45, 61       PAD (peripheral artery disease) (H)      PONV (postoperative nausea and vomiting)      Squamous cell lung cancer (H)      Thrombosis of leg 1967     Unspecified disorder of kidney and ureter     X-linked dominant Alport's syndrome.       PAST SURGICAL HISTORY:   Past Surgical History:   Procedure Laterality Date     BIOPSY      Kidney, Lung, Breast     BIOPSY ANAL N/A 03/14/2018    Procedure: BIOPSY ANAL;;  Surgeon: Shabbir Leo MD;  Location:  OR      BYPASS GRAFT FEMORAL FEMORAL Bilateral 04/25/2021    Procedure: Axplantation infected graft, femoral to femoral bypass with cadaveric artery, bilateral SATORIUS MUSCLE FLAP CREATION, evacuation of absece, vacuum closure;  Surgeon: Raven Lewis MD;  Location:  OR      TRANSPLANTATION OF KIDNEY  09/2004    recipient -- done at [facility]     COLONOSCOPY       COLONOSCOPY N/A 08/09/2017    Procedure: COMBINED COLONOSCOPY, SINGLE OR MULTIPLE BIOPSY/POLYPECTOMY BY BIOPSY;;  Surgeon: Sushil Hyatt MD;  Location:  GI     COLPOSCOPY,LOOP ELECTRD CERVIX EXCIS  03/11/2008    TAL II     CONIZATION LEEP  07/17/2013    Procedure: CONIZATION LEEP;;  Surgeon: Liliana Renteria MD;  Location:  OR     CONIZATION LEEP N/A 08/17/2016    Procedure: CONIZATION LEEP;  Surgeon: Liliana Renteria MD;  Location:  OR     CV CORONARY ANGIOGRAM N/A 04/06/2021    Procedure: CV CORONARY ANGIOGRAM;  Surgeon: Sincere Rosas MD;  Location:  HEART CARDIAC CATH LAB     CV CORONARY ANGIOGRAM N/A 04/25/2021    Procedure: Coronary Angiogram;  Surgeon: Sincere Rosas MD;  Location:  HEART CARDIAC CATH LAB     CV PCI STENT DRUG ELUTING N/A 04/06/2021    Procedure: Percutaneous Coronary Intervention Stent Drug Eluting;  Surgeon: Sincere Rosas MD;  Location:  HEART CARDIAC CATH LAB     ENDOVASCULAR REPAIR ANEURYSM AORTOILIAC Bilateral 04/06/2021    Procedure: Endovascular Abdominal Aortic Aneurysm Repair with Aortouniiliac Device and Femoral-Femoral Artery Bypass with [id number] Artegraft;  Surgeon: Abena Ferrara MD;  Location: [facility] OR     EXAM UNDER ANESTHESIA ANUS  07/15/2014    Procedure: EXAM UNDER ANESTHESIA ANUS;  Surgeon: Radha Musa MD;  Location: [facility]     EXAM UNDER ANESTHESIA ANUS N/A 03/14/2018    Procedure: EXAM UNDER ANESTHESIA ANUS;  Anal Exam Under Anesthesia With Excision of anal lesion, proctoscopy;  Surgeon: Shabbir Leo MD;  Location:  OR     EYE  SURGERY       GENITOURINARY SURGERY       IR OR ANGIOGRAM  04/06/2021     IRRIGATION AND DEBRIDEMENT GROIN Right 04/24/2021    Procedure: IRRIGATION AND DEBRIDEMENT, INGUINAL REGION, I & D PF RIGHT GROIN;  Surgeon: Raven Lewis MD;  Location: [facility]     IRRIGATION AND DEBRIDEMENT GROIN N/A 04/26/2021    Procedure: IRRIGATION AND DEBRIDEMENT, INGUINAL REGION, PLACEMENT OF VERAFLO WOUND VAC, WOUND WASHOUT,;  Surgeon: Raven Lewis MD;  Location: [facility]     LASER CO2 EXCISE VULVA WIDE LOCAL  07/15/2014    Procedure: LASER CO2 EXCISE VULVA WIDE LOCAL;  Surgeon: Liliana Renteria MD;  [facility]     LASER CO2 VAGINA  07/17/2013    Procedure: LASER CO2 VAGINA;;  Surgeon: Liliana Renteria MD;  [facility]     LASER CO2 VAGINA N/A 09/25/2018    Procedure: LASER CO2 VAGINA;  Exam Under Anesthesia, CO2 Laser Ablation of Upper Vagina and Cervix;  Surgeon: Pati Garcia MD;  Location: [facility]     MICROSCOPY ANAL  07/17/2013    Procedure: MICROSCOPY ANAL;  Anal Microscopy,  EUA vagina,Colposcopy Of Vagina And Vulva, Vaginal Biopsies, Omniguide Co2 Laser To Vagina and vulva, Loop Electrosurgical Excision Procedure To Cervix;  Surgeon: Radha Musa MD;  Location: [facility]     MICROSCOPY ANAL  07/15/2014    Procedure: MICROSCOPY ANAL;  Surgeon: Radha Musa MD;  [facility]: [facility]     PICC DOUBLE LUMEN PLACEMENT Right 04/30/2021    39cm, Basilic vein     TRANSESOPHAGEAL ECHOCARDIOGRAM INTRAOPERATIVE N/A 04/28/2021    Procedure: ECHOCARDIOGRAM, TRANSESOPHAGEAL, INTRAOPERATIVE;  Surgeon: GENERIC ANESTHESIA PROVIDER;  Location: [facility] OR     VASCULAR SURGERY      Thrombectomy     ZZC NONSPECIFIC PROCEDURE      Thrombectomy     ZZC NONSPECIFIC PROCEDURE  1955 and 1959    Bilater eye surgery - correction for crossed eyes     ZZC NONSPECIFIC PROCEDURE  1998    oopherectomy L     ZZC NONSPECIFIC PROCEDURE  1967    open kidney biopsy - L       Past medical history, past surgical history, medications, and  allergies were reviewed with the patient. Additional pertinent items: None    FAMILY HISTORY:   Family History   Problem Relation Age of Onset     Diabetes Father         type 2 diag age,60's     Alcohol/Drug Father      Arthritis Father      Hypertension Father      Lipids Father         high cholesterol     Arthritis Mother      Diabetes Mother      Depression Mother      Heart Disease Mother      Neurologic Disorder Mother      Obesity Mother      Psychotic Disorder Mother      Thyroid Disease Mother      Hypertension Mother      Gynecology Sister         Precancerous cell removal from cervix at age 45     Depression Sister      Allergies Sister      Alcohol/Drug Sister      Neurologic Disorder Sister      Cerebrovascular Disease Paternal Grandmother          of a stroke in her 80's     Diabetes Paternal Grandmother      Alcohol/Drug Son      Colon Polyps Sister      Breast Cancer Niece      Other Cancer Sister         Cervical     Obesity Sister      Depression Sister      Substance Abuse Son      Substance Abuse Sister      Asthma Other      Colon Cancer No family hx of      Crohn's Disease No family hx of      Ulcerative Colitis No family hx of      Melanoma No family hx of      Skin Cancer No family hx of        SOCIAL HISTORY:   Social History     Tobacco Use     Smoking status: Former Smoker     Packs/day: 0.30     Years: 35.00     Pack years: 10.50     Types: Cigarettes     Start date: 1967     Smokeless tobacco: Never Used     Tobacco comment: Couple times a week. 1-2 cigs 1-2x a week.   Substance Use Topics     Alcohol use: Not Currently     Alcohol/week: 0.0 standard drinks     Frequency: Monthly or less     Drinks per session: 1 or 2     Binge frequency: Less than monthly     Comment: rarely     Social history was reviewed with the patient. Additional pertinent items: None      Discharge Medication List as of 2021  7:08 PM      CONTINUE these medications which have NOT CHANGED    Details    acetaminophen (TYLENOL) 325 MG tablet Take 2 tablets (650 mg) by mouth every 4 hours as needed for other (For optimal non-opioid multimodal pain management to improve pain control.), Disp-100 tablet, R-3, E-Prescribe      aspirin (ASA) 81 MG chewable tablet Take 1 tablet (81 mg) by mouth daily, Disp-90 tablet, R-3, E-Prescribe      atorvastatin (LIPITOR) 80 MG tablet Take 1 tablet (80 mg) by mouth daily, Disp-60 tablet, R-4, E-Prescribe      calcium carbonate 600 mg-vitamin D 400 units (CALTRATE) 600-400 MG-UNIT per tablet Take 1 tablet by mouth 2 times daily, Disp-120 tablet, R-3, E-Prescribe      fluticasone (FLONASE) 50 MCG/ACT nasal spray Spray 1 spray into both nostrils daily, Disp-18.2 mL, R-3, E-Prescribe      heparin lock flush 10 UNIT/ML SOLN injection 5 mLs by Intracatheter route 2 times daily, Disp-100 mL, R-1, E-Prescribe      isosorbide mononitrate (IMDUR) 60 MG 24 hr tablet Take 1 tablet (60 mg) by mouth daily, Disp-60 tablet, R-4, E-Prescribe      lisinopril (ZESTRIL) 5 MG tablet Take 1 tablet (5 mg) by mouth daily, Disp-60 tablet, R-4, E-Prescribe      mycophenolic acid (GENERIC EQUIVALENT) 360 MG EC tablet Take 1 tablet (360 mg) by mouth 2 times daily, Disp-60 tablet, R-3, E-Prescribe      oxyCODONE (ROXICODONE) 5 MG tablet Take 0.5 tablets (2.5 mg) by mouth every 6 hours as needed for moderate to severe pain, Disp-20 tablet, R-0, E-Prescribe      pantoprazole (PROTONIX) 40 MG EC tablet Take 1 tablet (40 mg) by mouth every morning (before breakfast), Disp-60 tablet, R-3, E-Prescribe      predniSONE (DELTASONE) 5 MG tablet Take 1 tablet (5 mg) by mouth daily, Disp-90 tablet, R-3, E-Prescribe      sodium chloride, PF, 0.9% PF flush 10 mLs by Intracatheter route every hour as needed for line flush or post meds or blood draw, Disp-200 mL, R-1, E-Prescribe      ticagrelor (BRILINTA) 90 MG tablet Take 1 tablet (90 mg) by mouth every 12 hours, Disp-60 tablet, R-4, E-Prescribe                Allergies    Allergen Reactions     Blood Transfusion Related (Informational Only) Other (See Comments)     Patient has a history of a clinically significant antibody against RBC antigens.  A delay in compatible RBCs may occur.     Ultracet Nausea and Vomiting and Hives     Hydrocodone Nausea and Vomiting and Hives        Review of Systems   Constitutional: Negative for fever.   Respiratory: Positive for shortness of breath.    Genitourinary: Negative for difficulty urinating and dysuria.   Skin: Positive for color change (Redness around surgical site).   Neurological: Positive for weakness.         Physical Exam   BP: (!) 147/85  Pulse: 97  Temp: 98.9  F (37.2  C)  Resp: 16  SpO2: 96 %      Physical Exam  Vitals signs and nursing note reviewed.   Constitutional:       General: She is not in acute distress.     Appearance: She is well-developed. She is not ill-appearing or toxic-appearing.      Comments: Frail appearing   HENT:      Head: Normocephalic and atraumatic.   Eyes:      Pupils: Pupils are equal, round, and reactive to light.   Neck:      Musculoskeletal: Normal range of motion.   Cardiovascular:      Rate and Rhythm: Normal rate and regular rhythm.      Pulses: Normal pulses.      Heart sounds: Normal heart sounds.   Pulmonary:      Effort: Pulmonary effort is normal. No respiratory distress.      Breath sounds: Normal breath sounds. No stridor.   Abdominal:      General: There is no distension.      Palpations: Abdomen is soft. There is no mass.      Tenderness: There is no abdominal tenderness. There is no rebound.      Hernia: No hernia is present.   Genitourinary:     Comments: Bilateral inguinal region with healing puncture sites/surgical wounds and some mild redness surrounding it.  The wounds appear to be healing with minimal greenish discharge on the dressing.  No significant tenderness to touch. Right side with light redness surrounding the incision.  Musculoskeletal:         General: No tenderness.    Skin:     General: Skin is warm and dry.   Neurological:      Mental Status: She is alert and oriented to person, place, and time.   Psychiatric:         Behavior: Behavior normal.         Thought Content: Thought content normal.         ED Course   4:18 PM  The patient was seen and examined by Sally Miller MD in Room ED20.        Procedures                           The medical record was reviewed and interpreted.  Current labs reviewed and interpreted.  Previous labs reviewed and interpreted.     Results for orders placed or performed during the hospital encounter of 06/13/21 (from the past 24 hour(s))   Comprehensive metabolic panel   Result Value Ref Range    Sodium 137 133 - 144 mmol/L    Potassium 4.1 3.4 - 5.3 mmol/L    Chloride 105 94 - 109 mmol/L    Carbon Dioxide 26 20 - 32 mmol/L    Anion Gap 6 3 - 14 mmol/L    Glucose 117 (H) 70 - 99 mg/dL    Urea Nitrogen 24 7 - 30 mg/dL    Creatinine 1.22 (H) 0.52 - 1.04 mg/dL    GFR Estimate 45 (L) >60 mL/min/[1.73_m2]    GFR Estimate If Black 53 (L) >60 mL/min/[1.73_m2]    Calcium 9.6 8.5 - 10.1 mg/dL    Bilirubin Total 0.3 0.2 - 1.3 mg/dL    Albumin 2.8 (L) 3.4 - 5.0 g/dL    Protein Total 7.2 6.8 - 8.8 g/dL    Alkaline Phosphatase 148 40 - 150 U/L    ALT 8 0 - 50 U/L    AST 8 0 - 45 U/L   Lactic acid whole blood   Result Value Ref Range    Lactic Acid 1.3 0.7 - 2.0 mmol/L   CBC with platelets differential   Result Value Ref Range    WBC 6.3 4.0 - 11.0 10e9/L    RBC Count 3.61 (L) 3.8 - 5.2 10e12/L    Hemoglobin 10.2 (L) 11.7 - 15.7 g/dL    Hematocrit 33.7 (L) 35.0 - 47.0 %    MCV 93 78 - 100 fl    MCH 28.3 26.5 - 33.0 pg    MCHC 30.3 (L) 31.5 - 36.5 g/dL    RDW 17.2 (H) 10.0 - 15.0 %    Platelet Count 410 150 - 450 10e9/L    Diff Method Automated Method     % Neutrophils 87.3 %    % Lymphocytes 4.3 %    % Monocytes 7.0 %    % Eosinophils 0.6 %    % Basophils 0.6 %    % Immature Granulocytes 0.2 %    Nucleated RBCs 0 0 /100    Absolute Neutrophil 5.5 1.6 - 8.3  10e9/L    Absolute Lymphocytes 0.3 (L) 0.8 - 5.3 10e9/L    Absolute Monocytes 0.4 0.0 - 1.3 10e9/L    Absolute Eosinophils 0.0 0.0 - 0.7 10e9/L    Absolute Basophils 0.0 0.0 - 0.2 10e9/L    Abs Immature Granulocytes 0.0 0 - 0.4 10e9/L    Absolute Nucleated RBC 0.0    Blood culture    Specimen: Arm, Right; Blood    Right Arm   Result Value Ref Range    Specimen Description Blood Right Arm     Culture Micro PENDING    Blood culture    Specimen: Arm, Left; Blood    Left Arm   Result Value Ref Range    Specimen Description Blood Left Arm     Culture Micro PENDING    XR Chest 2 Views    Narrative    Exam: XR CHEST 2 VW, 6/13/2021 5:01 PM    Indication: sob    Comparison: Chest x-ray 4/30/2021.    Findings:   AP and lateral view of the chest. No focal consolidation or  pneumothorax. Trace bilateral pleural effusion. Enlarged descending  thoracic aorta, unchanged. Azygous lobe.      Impression    Impression:   1. No new airspace opacity.  2. Redemonstration of tortuous and enlarged thoracic aorta.     I have personally reviewed the examination and initial interpretation  and I agree with the findings.    EMILIE PRESSLEY MD   UA with Microscopic   Result Value Ref Range    Color Urine Light Yellow     Appearance Urine Clear     Glucose Urine Negative NEG^Negative mg/dL    Bilirubin Urine Negative NEG^Negative    Ketones Urine Negative NEG^Negative mg/dL    Specific Gravity Urine 1.011 1.003 - 1.035    Blood Urine Negative NEG^Negative    pH Urine 5.5 5.0 - 7.0 pH    Protein Albumin Urine 70 (A) NEG^Negative mg/dL    Urobilinogen mg/dL Normal 0.0 - 2.0 mg/dL    Nitrite Urine Negative NEG^Negative    Leukocyte Esterase Urine Negative NEG^Negative    Source Midstream Urine     WBC Urine 3 0 - 5 /HPF    RBC Urine 1 0 - 2 /HPF    Squamous Epithelial /HPF Urine <1 0 - 1 /HPF   Urine Culture    Specimen: Urine Midstream; Midstream Urine   Result Value Ref Range    Specimen Description Midstream Urine     Special Requests Specimen  received in preservative     Culture Micro PENDING      *Note: Due to a large number of results and/or encounters for the requested time period, some results have not been displayed. A complete set of results can be found in Results Review.     Medications   0.9% sodium chloride BOLUS (0 mLs Intravenous Stopped 6/13/21 1908)      Medications   0.9% sodium chloride BOLUS (0 mLs Intravenous Stopped 6/13/21 1908)            Assessments & Plan (with Medical Decision Making)  Patient is a very nice 70-year-old female who has a history of a kidney transplant who is status post endovascular AAA repair who had a subsequent femfem graft infection who presents to the ER as a referral from the home health nurse.  Patient has greenish discharge around her wounds and they wanted her to be evaluated.  Patient here did have some mild greenish discharge but was seen by surgery who thinks it is likely the ointment that they are putting on the wound that is turning more of a green color.  They do not feel that she has an acute infection at this time.  Patient does not have any systemic signs of infection including fever or chills.  We did obtain labs that shows a normal CBC.  Normal lactic acid.  Her blood cultures are pending x2.  Patient did complain of feeling a little short of breath and tired but her chest x-ray is normal.  Patient is living at home and just recently discharged from a rehab facility.  Surgery recommended her being discharged home with outpatient follow-up.  They said that the surgery coordinator would call her tomorrow to make sure she is doing well.  I discussed this plan of care with the patient who agrees.  She says that she is feeling better and would prefer to be discharged home.  Patient is satting 98% on room air and she is well-appearing so I feel stable discharging the patient home.  Patient follow-up with vascular surgery for further care.    This part of the medical record was transcribed by Jackie  Dominga Medical Scribe, from a dictation done by Argelia Miller MD.        I have reviewed the nursing notes.    I have reviewed the findings, diagnosis, plan and need for follow up with the patient.    Discharge Medication List as of 6/13/2021  7:08 PM          Final diagnoses:   Wound drainage   Kidney replaced by transplant   Abdominal aortic aneurysm (AAA) without rupture (H) - femoral infection   I, Presley Dubois, am serving as a trained medical scribe to document services personally performed by Argelia Miller MD, based on the provider's statements to me.      I, Argelia Miller MD, was physically present and have reviewed and verified the accuracy of this note documented by Presley Dubois.     6/13/2021   [facility] EMERGENCY DEPARTMENT     Argelia Miller MD  06/13/21 2005

## 2021-06-14 ENCOUNTER — PATIENT OUTREACH (OUTPATIENT)
Dept: CARE COORDINATION | Facility: CLINIC | Age: 71
End: 2021-06-14

## 2021-06-14 ENCOUNTER — MEDICAL CORRESPONDENCE (OUTPATIENT)
Dept: HEALTH INFORMATION MANAGEMENT | Facility: CLINIC | Age: 71
End: 2021-06-14
Payer: COMMERCIAL

## 2021-06-14 DIAGNOSIS — Z71.89 OTHER SPECIFIED COUNSELING: ICD-10-CM

## 2021-06-14 LAB
BACTERIA SPEC CULT: NO GROWTH
Lab: NORMAL
SPECIMEN SOURCE: NORMAL

## 2021-06-14 NOTE — RESULT ENCOUNTER NOTE
[facility] Emergency Dept discharge antibiotic (if prescribed): None  No changes in treatment per [facility] ED Lab Result Urine culture protocol.

## 2021-06-14 NOTE — RESULT ENCOUNTER NOTE
[facility] ED discharge antibiotic (if prescribed):  None  Incision and Drainage performed in Emergency Dept [Yes / No] : no  No changes in treatment per [facility] ED lab result culture protocol.

## 2021-06-14 NOTE — PROGRESS NOTES
Clinic Care Coordination Contact  Community Health Worker Initial Outreach    CHW Initial Information Gathering:  Referral Source: ED Follow-Up  Preferred Hospital: [facility]  [phone]  Current living arrangement:: I live in a private home with spouse  Type of residence:: Private home - stairs  Community Resources: Home Care  Supplies used at home:: Wound Care Supplies  Equipment Currently Used at Home: walker, standard  Informal Support system:: Spouse, Children  No PCP office visit in Past Year: No  Transportation means:: Family       Patient accepts CC:pt declined,CC.

## 2021-06-15 ENCOUNTER — HOME INFUSION (PRE-WILLOW HOME INFUSION) (OUTPATIENT)
Dept: PHARMACY | Facility: CLINIC | Age: 71
End: 2021-06-15

## 2021-06-15 LAB
BACTERIA SPEC CULT: NO GROWTH
Lab: NORMAL
SPECIMEN SOURCE: NORMAL

## 2021-06-17 NOTE — PROGRESS NOTES
This is a recent snapshot of the patient's [facility] medical record.  For current drug dose and complete information and questions, call [phone] or In Basket pool, [facility] home infusion ([id number])  CSN Number:  [id number]

## 2021-06-18 ENCOUNTER — TELEPHONE (OUTPATIENT)
Dept: CARDIOLOGY | Facility: CLINIC | Age: 71
End: 2021-06-18

## 2021-06-19 LAB
BACTERIA SPEC CULT: NO GROWTH
BACTERIA SPEC CULT: NO GROWTH
SPECIMEN SOURCE: NORMAL
SPECIMEN SOURCE: NORMAL

## 2021-06-21 ENCOUNTER — MEDICAL CORRESPONDENCE (OUTPATIENT)
Dept: HEALTH INFORMATION MANAGEMENT | Facility: CLINIC | Age: 71
End: 2021-06-21

## 2021-06-22 ENCOUNTER — ANCILLARY PROCEDURE (OUTPATIENT)
Dept: ULTRASOUND IMAGING | Facility: CLINIC | Age: 71
End: 2021-06-22
Attending: EMERGENCY MEDICINE
Payer: COMMERCIAL

## 2021-06-22 ENCOUNTER — HOSPITAL ENCOUNTER (INPATIENT)
Facility: CLINIC | Age: 71
LOS: 2 days | Discharge: HOME HEALTH | DRG: 291 | End: 2021-06-24
Attending: EMERGENCY MEDICINE | Admitting: INTERNAL MEDICINE
Payer: COMMERCIAL

## 2021-06-22 ENCOUNTER — APPOINTMENT (OUTPATIENT)
Dept: CT IMAGING | Facility: CLINIC | Age: 71
DRG: 291 | End: 2021-06-22
Attending: EMERGENCY MEDICINE
Payer: COMMERCIAL

## 2021-06-22 ENCOUNTER — APPOINTMENT (OUTPATIENT)
Dept: GENERAL RADIOLOGY | Facility: CLINIC | Age: 71
DRG: 291 | End: 2021-06-22
Attending: EMERGENCY MEDICINE
Payer: COMMERCIAL

## 2021-06-22 ENCOUNTER — VIRTUAL VISIT (OUTPATIENT)
Dept: VASCULAR SURGERY | Facility: CLINIC | Age: 71
End: 2021-06-22
Payer: COMMERCIAL

## 2021-06-22 DIAGNOSIS — I25.10 CORONARY ARTERY DISEASE INVOLVING NATIVE HEART, ANGINA PRESENCE UNSPECIFIED, UNSPECIFIED VESSEL OR LESION TYPE: Primary | ICD-10-CM

## 2021-06-22 DIAGNOSIS — R06.00 DYSPNEA, UNSPECIFIED TYPE: ICD-10-CM

## 2021-06-22 DIAGNOSIS — I71.40 ABDOMINAL AORTIC ANEURYSM (AAA) WITHOUT RUPTURE (H): ICD-10-CM

## 2021-06-22 DIAGNOSIS — R78.81 BACTEREMIA DUE TO METHICILLIN SUSCEPTIBLE STAPHYLOCOCCUS AUREUS (MSSA): ICD-10-CM

## 2021-06-22 DIAGNOSIS — I73.9 PERIPHERAL ARTERY DISEASE (H): ICD-10-CM

## 2021-06-22 DIAGNOSIS — I25.10 CORONARY ARTERY DISEASE INVOLVING NATIVE HEART, ANGINA PRESENCE UNSPECIFIED, UNSPECIFIED VESSEL OR LESION TYPE: ICD-10-CM

## 2021-06-22 DIAGNOSIS — I50.9 CONGESTIVE HEART FAILURE, UNSPECIFIED HF CHRONICITY, UNSPECIFIED HEART FAILURE TYPE (H): ICD-10-CM

## 2021-06-22 DIAGNOSIS — L02.214 ABSCESS OF GROIN: ICD-10-CM

## 2021-06-22 DIAGNOSIS — Z11.52 ENCOUNTER FOR SCREENING LABORATORY TESTING FOR COVID-19 VIRUS: ICD-10-CM

## 2021-06-22 DIAGNOSIS — B95.61 BACTEREMIA DUE TO METHICILLIN SUSCEPTIBLE STAPHYLOCOCCUS AUREUS (MSSA): ICD-10-CM

## 2021-06-22 DIAGNOSIS — I21.3 ST ELEVATION MYOCARDIAL INFARCTION (STEMI), UNSPECIFIED ARTERY (H): ICD-10-CM

## 2021-06-22 DIAGNOSIS — T81.49XA WOUND INFECTION AFTER SURGERY: ICD-10-CM

## 2021-06-22 LAB
ALBUMIN SERPL-MCNC: 3.3 G/DL (ref 3.4–5)
ALP SERPL-CCNC: 175 U/L (ref 40–150)
ALT SERPL W P-5'-P-CCNC: 19 U/L (ref 0–50)
ANION GAP SERPL CALCULATED.3IONS-SCNC: 9 MMOL/L (ref 3–14)
AST SERPL W P-5'-P-CCNC: 14 U/L (ref 0–45)
BASOPHILS # BLD AUTO: 0.1 10E9/L (ref 0–0.2)
BASOPHILS NFR BLD AUTO: 0.7 %
BILIRUB SERPL-MCNC: 0.3 MG/DL (ref 0.2–1.3)
BUN SERPL-MCNC: 30 MG/DL (ref 7–30)
CALCIUM SERPL-MCNC: 9.9 MG/DL (ref 8.5–10.1)
CHLORIDE SERPL-SCNC: 100 MMOL/L (ref 94–109)
CO2 SERPL-SCNC: 24 MMOL/L (ref 20–32)
CREAT SERPL-MCNC: 1.31 MG/DL (ref 0.52–1.04)
DIFFERENTIAL METHOD BLD: ABNORMAL
EOSINOPHIL # BLD AUTO: 0.1 10E9/L (ref 0–0.7)
EOSINOPHIL NFR BLD AUTO: 0.7 %
ERYTHROCYTE [DISTWIDTH] IN BLOOD BY AUTOMATED COUNT: 16.9 % (ref 10–15)
GFR SERPL CREATININE-BSD FRML MDRD: 42 ML/MIN/{1.73_M2}
GLUCOSE SERPL-MCNC: 90 MG/DL (ref 70–99)
HCT VFR BLD AUTO: 41 % (ref 35–47)
HGB BLD-MCNC: 12.7 G/DL (ref 11.7–15.7)
IMM GRANULOCYTES # BLD: 0 10E9/L (ref 0–0.4)
IMM GRANULOCYTES NFR BLD: 0.4 %
LIPASE SERPL-CCNC: 113 U/L (ref 73–393)
LYMPHOCYTES # BLD AUTO: 0.4 10E9/L (ref 0.8–5.3)
LYMPHOCYTES NFR BLD AUTO: 6.3 %
MCH RBC QN AUTO: 27.7 PG (ref 26.5–33)
MCHC RBC AUTO-ENTMCNC: 31 G/DL (ref 31.5–36.5)
MCV RBC AUTO: 90 FL (ref 78–100)
MONOCYTES # BLD AUTO: 0.8 10E9/L (ref 0–1.3)
MONOCYTES NFR BLD AUTO: 10.8 %
NEUTROPHILS # BLD AUTO: 5.7 10E9/L (ref 1.6–8.3)
NEUTROPHILS NFR BLD AUTO: 81.1 %
NRBC # BLD AUTO: 0 10*3/UL
NRBC BLD AUTO-RTO: 0 /100
NT-PROBNP SERPL-MCNC: 8875 PG/ML (ref 0–125)
PLATELET # BLD AUTO: 381 10E9/L (ref 150–450)
POTASSIUM SERPL-SCNC: 4.3 MMOL/L (ref 3.4–5.3)
PROT SERPL-MCNC: 8 G/DL (ref 6.8–8.8)
RBC # BLD AUTO: 4.58 10E12/L (ref 3.8–5.2)
SODIUM SERPL-SCNC: 134 MMOL/L (ref 133–144)
TROPONIN I SERPL-MCNC: 0.01 UG/L (ref 0–0.04)
WBC # BLD AUTO: 7 10E9/L (ref 4–11)

## 2021-06-22 PROCEDURE — 71046 X-RAY EXAM CHEST 2 VIEWS: CPT | Mod: 26 | Performed by: RADIOLOGY

## 2021-06-22 PROCEDURE — 99285 EMERGENCY DEPT VISIT HI MDM: CPT | Mod: 25 | Performed by: EMERGENCY MEDICINE

## 2021-06-22 PROCEDURE — 36415 COLL VENOUS BLD VENIPUNCTURE: CPT | Performed by: PATHOLOGY

## 2021-06-22 PROCEDURE — 258N000003 HC RX IP 258 OP 636: Performed by: EMERGENCY MEDICINE

## 2021-06-22 PROCEDURE — 87040 BLOOD CULTURE FOR BACTERIA: CPT | Mod: 90 | Performed by: PATHOLOGY

## 2021-06-22 PROCEDURE — 214N000001 HC R&B CCU UMMC

## 2021-06-22 PROCEDURE — 71046 X-RAY EXAM CHEST 2 VIEWS: CPT

## 2021-06-22 PROCEDURE — U0005 INFEC AGEN DETEC AMPLI PROBE: HCPCS | Performed by: EMERGENCY MEDICINE

## 2021-06-22 PROCEDURE — 93010 ELECTROCARDIOGRAM REPORT: CPT | Performed by: EMERGENCY MEDICINE

## 2021-06-22 PROCEDURE — 71275 CT ANGIOGRAPHY CHEST: CPT | Mod: 26 | Performed by: RADIOLOGY

## 2021-06-22 PROCEDURE — 93005 ELECTROCARDIOGRAM TRACING: CPT | Performed by: EMERGENCY MEDICINE

## 2021-06-22 PROCEDURE — 80053 COMPREHEN METABOLIC PANEL: CPT | Performed by: EMERGENCY MEDICINE

## 2021-06-22 PROCEDURE — 250N000011 HC RX IP 250 OP 636: Performed by: RADIOLOGY

## 2021-06-22 PROCEDURE — 84484 ASSAY OF TROPONIN QUANT: CPT | Performed by: EMERGENCY MEDICINE

## 2021-06-22 PROCEDURE — C9803 HOPD COVID-19 SPEC COLLECT: HCPCS | Performed by: EMERGENCY MEDICINE

## 2021-06-22 PROCEDURE — 83690 ASSAY OF LIPASE: CPT | Performed by: EMERGENCY MEDICINE

## 2021-06-22 PROCEDURE — U0003 INFECTIOUS AGENT DETECTION BY NUCLEIC ACID (DNA OR RNA); SEVERE ACUTE RESPIRATORY SYNDROME CORONAVIRUS 2 (SARS-COV-2) (CORONAVIRUS DISEASE [COVID-19]), AMPLIFIED PROBE TECHNIQUE, MAKING USE OF HIGH THROUGHPUT TECHNOLOGIES AS DESCRIBED BY CMS-2020-01-R: HCPCS | Performed by: EMERGENCY MEDICINE

## 2021-06-22 PROCEDURE — 83735 ASSAY OF MAGNESIUM: CPT | Performed by: EMERGENCY MEDICINE

## 2021-06-22 PROCEDURE — 96360 HYDRATION IV INFUSION INIT: CPT | Mod: 59 | Performed by: EMERGENCY MEDICINE

## 2021-06-22 PROCEDURE — 83880 ASSAY OF NATRIURETIC PEPTIDE: CPT | Performed by: PATHOLOGY

## 2021-06-22 PROCEDURE — 71275 CT ANGIOGRAPHY CHEST: CPT

## 2021-06-22 PROCEDURE — 85025 COMPLETE CBC W/AUTO DIFF WBC: CPT | Performed by: EMERGENCY MEDICINE

## 2021-06-22 PROCEDURE — 86140 C-REACTIVE PROTEIN: CPT | Performed by: EMERGENCY MEDICINE

## 2021-06-22 PROCEDURE — 99024 POSTOP FOLLOW-UP VISIT: CPT | Mod: 95 | Performed by: NURSE PRACTITIONER

## 2021-06-22 RX ORDER — IOPAMIDOL 755 MG/ML
42 INJECTION, SOLUTION INTRAVASCULAR ONCE
Status: COMPLETED | OUTPATIENT
Start: 2021-06-22 | End: 2021-06-22

## 2021-06-22 RX ADMIN — SODIUM CHLORIDE 250 ML: 9 INJECTION, SOLUTION INTRAVENOUS at 23:05

## 2021-06-22 RX ADMIN — IOPAMIDOL 42 ML: 755 INJECTION, SOLUTION INTRAVENOUS at 22:12

## 2021-06-22 ASSESSMENT — ENCOUNTER SYMPTOMS
COUGH: 0
SHORTNESS OF BREATH: 1
UNEXPECTED WEIGHT CHANGE: 0
PALPITATIONS: 0
FEVER: 0
FATIGUE: 1
ABDOMINAL PAIN: 1

## 2021-06-22 ASSESSMENT — PAIN SCALES - GENERAL: PAINLEVEL: NO PAIN (0)

## 2021-06-22 ASSESSMENT — MIFFLIN-ST. JEOR: SCORE: 899.2

## 2021-06-22 NOTE — PATIENT INSTRUCTIONS
-BNP and echocardiogram ordered  -Follow up with PCP in 1 week to discuss shortness of breath  -Follow up with cardiology as previously scheduled  -Continue dressing changes as prescribed, please send photos of your wound via [employer]  -Follow up with Dr. Ferrara on 6/25 with imaging studies on 6/23 as previously scheduled.  -Report to the ED if symptoms worsen.

## 2021-06-22 NOTE — PROGRESS NOTES
Vascular Surgery Follow Up:    Maribel is a 70 year old who is being evaluated via a billable video visit.      How would you like to obtain your AVS? MyChart  If the video visit is dropped, the invitation should be resent by: Other e-mail: my chart connect  Will anyone else be joining your video visit? No      Video Start Time: 0948    Assessment & Plan   Problem List Items Addressed This Visit     Abdominal aortic aneurysm (AAA) without rupture (H)      Other Visit Diagnoses     Coronary artery disease involving native heart, angina presence unspecified, unspecified vessel or lesion type    -  Primary    Relevant Orders    BNP-N terminal pro    Echocardiogram Complete    Dyspnea, unspecified type        Relevant Orders    BNP-N terminal pro    Echocardiogram Complete    Wound infection after surgery             71 yo F s/p EVAR AUI and left to right femoral to femoral bypass graft for 5.9 cm AAA. Initial post-op course was complicated by inferior STEMI requiring emergent cardiac catheterization and PCI to RCA.  She was readmitted to the hospital on 4/23/2021 for infection of her fem-fem bypass which was subsequently explanted and replaced with a homograft and closed with bilateral sartorius muscle flaps and placement of wound vacs to bilateral groins.  Post-operatively she experienced EKG changes and was found to have diffuse vasospasms that resolved.  She was discharged to TCU where our service was following.  She has since been discharged and her wound vacs have been removed.      Her wounds are healing well and are almost approximated.  She presented to the ED for concerns of drainage, however, that had resolved and she feels the wounds are progressing well.   She continues to have shortness of breath that she feels is worse since our last visit.  During her visit to the ED, she had a chest x-ray performed that was read as normal and she was sating in the high 90's.  She now no longer feels like she can do  activity and she continues to have orthopnea at night.  Her previous ROLY (4/26) in hospital showed moderately reduced EF of 935-40% with small pericardial effusion.  She has arranged follow up with cardiology, but they are unable to see her until August.  I continue to be concerned about her cardiac function, worsening dyspnea, and declining physical functioning secondary to her dyspnea.    -BNP and echocardiogram ordered  -Follow up with PCP in 1 week to discuss dyspnea  -Follow up with cardiology as previously scheduled  -Continue dressing changes as prescribed, she will send photos this week  -Follow up with Dr. Ferrara on 6/25 with imaging studies on 6/23 as previously scheduled.  -Report to the ED if symptoms worsen.    Jessica Bunn NP  [facility] ROD López is a 70 year old who presents for the following health issues:      \[facility] [place] [facility]\""     Maribel Yang is a 70 year old female with PMH significant for CKD s/p transplant in 2003 and lung LCC in remission since 2014 who underwent fem-fem bypass and EVAR on 4/6/2021 with Dr. Ferrara.  Her post-operative course was complicated by STEMI and she underwent PCI of the RCA on 4/7/2021.  She was discharged but readmitted shortly after with sepsi and infection of the right groin incision.  She underwent excision of the fem-fem bypass, replacement with homograft, bilateral sartorius muscle flaps, and placement of a wound vac.  Post-operatively, she had EKG changes and underwent coronary angiogram which revealed diffuse coronary vasospasm.  She was admitted to [facility] for ongoing physical rehab, IV antibiotics, and wound care.  She was discharged on 5/23 and has returned home.  She presents today for routine follow up regarding her wound.      She states her wounds are healing very well.  She is able to do her own dressing changes and they have almost completely closed.  She continues to struggle with dyspnea which she feels has  gotten worse since our last visit.  She was hoping it would improve with the cooler weather.  She was previously ambulating in the house and able to do some chores like make the bed and she is unable to do that at present.  She continues to sleep being propped up and feels her dyspnea is worse at night.  She can lay on her side during the daytime hours, but has had to buy some pillows to keep herself upright at night.  She is also struggling with constipation the last several days which is limiting her activity.  She denies chest pain but is experiencing some back pain from sleeping upright.  She denies symptoms of claudication or rest pain, but overall feels deconditioned.  Denies fevers or chills.      Review of Systems   All other systems negative except as otherwise noted.      Objective    Vitals - Patient Reported  Pain Score: No Pain (0)        Physical Exam   GENERAL: Appears frail, malnourished.  EYES: Eyes grossly normal to inspection.  No discharge or erythema, or obvious scleral/conjunctival abnormalities.  RESP: Conversational dyspnea    SKIN: Visible skin clear. No significant rash, abnormal pigmentation or lesions.  NEURO: Cranial nerves grossly intact.  Mentation and speech appropriate for age.  PSYCH: Mentation appears normal, affect normal/bright, judgement and insight intact, normal speech and appearance well-groomed.            Video-Visit Details    Type of service:  Video Visit    Video End Time:1000am    Originating Location (pt. Location): Home    Distant Location (provider location):  [facility] [place]     Platform used for Video Visit: [employer]

## 2021-06-22 NOTE — LETTER
6/22/2021       RE: Maribel Yang  [address]  [place] [address]     Dear Colleague,    Thank you for referring your patient, Maribel Yang, to the [facility] [place] [facility]. Please see a copy of my visit note below.    Vascular Surgery Follow Up:    Maribel is a 70 year old who is being evaluated via a billable video visit.      How would you like to obtain your AVS? MyChart  If the video visit is dropped, the invitation should be resent by: Other e-mail: my chart connect  Will anyone else be joining your video visit? No      Video Start Time: 0948    Assessment & Plan   Problem List Items Addressed This Visit     Abdominal aortic aneurysm (AAA) without rupture (H)      Other Visit Diagnoses     Coronary artery disease involving native heart, angina presence unspecified, unspecified vessel or lesion type    -  Primary    Relevant Orders    BNP-N terminal pro    Echocardiogram Complete    Dyspnea, unspecified type        Relevant Orders    BNP-N terminal pro    Echocardiogram Complete    Wound infection after surgery             69 yo F s/p EVAR AUI and left to right femoral to femoral bypass graft for 5.9 cm AAA. Initial post-op course was complicated by inferior STEMI requiring emergent cardiac catheterization and PCI to RCA.  She was readmitted to the hospital on 4/23/2021 for infection of her fem-fem bypass which was subsequently explanted and replaced with a homograft and closed with bilateral sartorius muscle flaps and placement of wound vacs to bilateral groins.  Post-operatively she experienced EKG changes and was found to have diffuse vasospasms that resolved.  She was discharged to TCU where our service was following.  She has since been discharged and her wound vacs have been removed.      Her wounds are healing well and are almost approximated.  She presented to the ED for concerns of drainage, however,  that had resolved and she feels the wounds are progressing well.   She continues to have shortness of breath that she feels is worse since our last visit.  During her visit to the ED, she had a chest x-ray performed that was read as normal and she was sating in the high 90's.  She now no longer feels like she can do activity and she continues to have orthopnea at night.  Her previous ROLY (4/26) in hospital showed moderately reduced EF of 935-40% with small pericardial effusion.  She has arranged follow up with cardiology, but they are unable to see her until August.  I continue to be concerned about her cardiac function, worsening dyspnea, and declining physical functioning secondary to her dyspnea.    -BNP and echocardiogram ordered  -Follow up with PCP in 1 week to discuss dyspnea  -Follow up with cardiology as previously scheduled  -Continue dressing changes as prescribed, she will send photos this week  -Follow up with Dr. Ferrara on 6/25 with imaging studies on 6/23 as previously scheduled.  -Report to the ED if symptoms worsen.    Jessica Bunn NP  [facility] ROD López is a 70 year old who presents for the following health issues:      [facility]     Maribel Yang is a 70 year old female with PMH significant for CKD s/p transplant in 2003 and lung LCC in remission since 2014 who underwent fem-fem bypass and EVAR on 4/6/2021 with Dr. Ferrara.  Her post-operative course was complicated by STEMI and she underwent PCI of the RCA on 4/7/2021.  She was discharged but readmitted shortly after with sepsi and infection of the right groin incision.  She underwent excision of the fem-fem bypass, replacement with homograft, bilateral sartorius muscle flaps, and placement of a wound vac.  Post-operatively, she had EKG changes and underwent coronary angiogram which revealed diffuse coronary vasospasm.  She was admitted to [facility] for ongoing physical rehab, IV antibiotics, and  wound care.  She was discharged on 5/23 and has returned home.  She presents today for routine follow up regarding her wound.      She states her wounds are healing very well.  She is able to do her own dressing changes and they have almost completely closed.  She continues to struggle with dyspnea which she feels has gotten worse since our last visit.  She was hoping it would improve with the cooler weather.  She was previously ambulating in the house and able to do some chores like make the bed and she is unable to do that at present.  She continues to sleep being propped up and feels her dyspnea is worse at night.  She can lay on her side during the daytime hours, but has had to buy some pillows to keep herself upright at night.  She is also struggling with constipation the last several days which is limiting her activity.  She denies chest pain but is experiencing some back pain from sleeping upright.  She denies symptoms of claudication or rest pain, but overall feels deconditioned.  Denies fevers or chills.      Review of Systems   All other systems negative except as otherwise noted.      Objective    Vitals - Patient Reported  Pain Score: No Pain (0)        Physical Exam   GENERAL: Appears frail, malnourished.  EYES: Eyes grossly normal to inspection.  No discharge or erythema, or obvious scleral/conjunctival abnormalities.  RESP: Conversational dyspnea    SKIN: Visible skin clear. No significant rash, abnormal pigmentation or lesions.  NEURO: Cranial nerves grossly intact.  Mentation and speech appropriate for age.  PSYCH: Mentation appears normal, affect normal/bright, judgement and insight intact, normal speech and appearance well-groomed.            Video-Visit Details    Type of service:  Video Visit    Video End Time:1000am    Originating Location (pt. Location): Home    Distant Location (provider location):  [facility] [place]     Platform used for Video Visit:  [employer]        Again, thank you for allowing me to participate in the care of your patient.      Sincerely,    Jessica Bunn NP

## 2021-06-22 NOTE — ED TRIAGE NOTES
Maribel was referred to the ED today for further evaluation and treat of SOB, abnormal labs, and LEFT sided abdominal pain.

## 2021-06-22 NOTE — NURSING NOTE
Vascular Rooming Note     Maribel Yang's goals for this visit include:   Chief Complaint   Patient presents with     CLEMENT López, is participatingin a virtual visit today for a wound follow up, feeling ok, experiencing sob yet, no other concerns at this time, as reported by patient.     Kianna Erickson LPN

## 2021-06-23 ENCOUNTER — APPOINTMENT (OUTPATIENT)
Dept: CARDIOLOGY | Facility: CLINIC | Age: 71
DRG: 291 | End: 2021-06-23
Attending: PHYSICIAN ASSISTANT
Payer: COMMERCIAL

## 2021-06-23 LAB
ALBUMIN SERPL-MCNC: 2.8 G/DL (ref 3.4–5)
ALBUMIN UR-MCNC: NEGATIVE MG/DL
ALP SERPL-CCNC: 150 U/L (ref 40–150)
ALT SERPL W P-5'-P-CCNC: 15 U/L (ref 0–50)
ANION GAP SERPL CALCULATED.3IONS-SCNC: 8 MMOL/L (ref 3–14)
ANION GAP SERPL CALCULATED.3IONS-SCNC: 9 MMOL/L (ref 3–14)
APPEARANCE UR: CLEAR
AST SERPL W P-5'-P-CCNC: 12 U/L (ref 0–45)
BASOPHILS # BLD AUTO: 0.1 10E9/L (ref 0–0.2)
BASOPHILS NFR BLD AUTO: 1 %
BILIRUB SERPL-MCNC: 0.3 MG/DL (ref 0.2–1.3)
BILIRUB UR QL STRIP: NEGATIVE
BUN SERPL-MCNC: 29 MG/DL (ref 7–30)
BUN SERPL-MCNC: 29 MG/DL (ref 7–30)
CALCIUM SERPL-MCNC: 9.1 MG/DL (ref 8.5–10.1)
CALCIUM SERPL-MCNC: 9.2 MG/DL (ref 8.5–10.1)
CHLORIDE SERPL-SCNC: 103 MMOL/L (ref 94–109)
CHLORIDE SERPL-SCNC: 104 MMOL/L (ref 94–109)
CO2 SERPL-SCNC: 23 MMOL/L (ref 20–32)
CO2 SERPL-SCNC: 24 MMOL/L (ref 20–32)
COLOR UR AUTO: NORMAL
CREAT SERPL-MCNC: 1.31 MG/DL (ref 0.52–1.04)
CREAT SERPL-MCNC: 1.35 MG/DL (ref 0.52–1.04)
CRP SERPL-MCNC: 14 MG/L (ref 0–8)
CRP SERPL-MCNC: 15 MG/L (ref 0–8)
DIFFERENTIAL METHOD BLD: ABNORMAL
EOSINOPHIL # BLD AUTO: 0.2 10E9/L (ref 0–0.7)
EOSINOPHIL NFR BLD AUTO: 3.3 %
ERYTHROCYTE [DISTWIDTH] IN BLOOD BY AUTOMATED COUNT: 17 % (ref 10–15)
GFR SERPL CREATININE-BSD FRML MDRD: 40 ML/MIN/{1.73_M2}
GFR SERPL CREATININE-BSD FRML MDRD: 42 ML/MIN/{1.73_M2}
GGT SERPL-CCNC: 76 U/L (ref 0–40)
GLUCOSE SERPL-MCNC: 187 MG/DL (ref 70–99)
GLUCOSE SERPL-MCNC: 95 MG/DL (ref 70–99)
GLUCOSE UR STRIP-MCNC: NEGATIVE MG/DL
HCT VFR BLD AUTO: 37.3 % (ref 35–47)
HGB BLD-MCNC: 11.5 G/DL (ref 11.7–15.7)
HGB UR QL STRIP: NEGATIVE
IMM GRANULOCYTES # BLD: 0 10E9/L (ref 0–0.4)
IMM GRANULOCYTES NFR BLD: 0.7 %
INTERPRETATION ECG - MUSE: NORMAL
KETONES UR STRIP-MCNC: NEGATIVE MG/DL
LABORATORY COMMENT REPORT: NORMAL
LEUKOCYTE ESTERASE UR QL STRIP: NEGATIVE
LYMPHOCYTES # BLD AUTO: 0.5 10E9/L (ref 0.8–5.3)
LYMPHOCYTES NFR BLD AUTO: 8.7 %
MAGNESIUM SERPL-MCNC: 1.8 MG/DL (ref 1.6–2.3)
MAGNESIUM SERPL-MCNC: 1.9 MG/DL (ref 1.6–2.3)
MAGNESIUM SERPL-MCNC: 2.1 MG/DL (ref 1.6–2.3)
MCH RBC QN AUTO: 27.6 PG (ref 26.5–33)
MCHC RBC AUTO-ENTMCNC: 30.8 G/DL (ref 31.5–36.5)
MCV RBC AUTO: 90 FL (ref 78–100)
MONOCYTES # BLD AUTO: 0.7 10E9/L (ref 0–1.3)
MONOCYTES NFR BLD AUTO: 11.8 %
NEUTROPHILS # BLD AUTO: 4.3 10E9/L (ref 1.6–8.3)
NEUTROPHILS NFR BLD AUTO: 74.5 %
NITRATE UR QL: NEGATIVE
NRBC # BLD AUTO: 0 10*3/UL
NRBC BLD AUTO-RTO: 0 /100
PH UR STRIP: 6.5 PH (ref 5–7)
PLATELET # BLD AUTO: 321 10E9/L (ref 150–450)
POTASSIUM SERPL-SCNC: 3.4 MMOL/L (ref 3.4–5.3)
POTASSIUM SERPL-SCNC: 3.5 MMOL/L (ref 3.4–5.3)
PROT SERPL-MCNC: 6.8 G/DL (ref 6.8–8.8)
RBC # BLD AUTO: 4.16 10E12/L (ref 3.8–5.2)
SARS-COV-2 RNA RESP QL NAA+PROBE: NEGATIVE
SODIUM SERPL-SCNC: 135 MMOL/L (ref 133–144)
SODIUM SERPL-SCNC: 136 MMOL/L (ref 133–144)
SOURCE: NORMAL
SP GR UR STRIP: 1.01 (ref 1–1.03)
SPECIMEN SOURCE: NORMAL
UROBILINOGEN UR STRIP-MCNC: NORMAL MG/DL (ref 0–2)
WBC # BLD AUTO: 5.8 10E9/L (ref 4–11)

## 2021-06-23 PROCEDURE — G0463 HOSPITAL OUTPT CLINIC VISIT: HCPCS | Mod: 25

## 2021-06-23 PROCEDURE — 99223 1ST HOSP IP/OBS HIGH 75: CPT | Mod: GC | Performed by: INTERNAL MEDICINE

## 2021-06-23 PROCEDURE — 93308 TTE F-UP OR LMTD: CPT | Mod: 26 | Performed by: INTERNAL MEDICINE

## 2021-06-23 PROCEDURE — 81003 URINALYSIS AUTO W/O SCOPE: CPT | Performed by: STUDENT IN AN ORGANIZED HEALTH CARE EDUCATION/TRAINING PROGRAM

## 2021-06-23 PROCEDURE — 250N000013 HC RX MED GY IP 250 OP 250 PS 637: Performed by: STUDENT IN AN ORGANIZED HEALTH CARE EDUCATION/TRAINING PROGRAM

## 2021-06-23 PROCEDURE — 250N000011 HC RX IP 250 OP 636: Performed by: PHYSICIAN ASSISTANT

## 2021-06-23 PROCEDURE — 93321 DOPPLER ECHO F-UP/LMTD STD: CPT | Mod: 26 | Performed by: INTERNAL MEDICINE

## 2021-06-23 PROCEDURE — 93325 DOPPLER ECHO COLOR FLOW MAPG: CPT | Mod: 26 | Performed by: INTERNAL MEDICINE

## 2021-06-23 PROCEDURE — 250N000009 HC RX 250: Performed by: PHYSICIAN ASSISTANT

## 2021-06-23 PROCEDURE — 93321 DOPPLER ECHO F-UP/LMTD STD: CPT

## 2021-06-23 PROCEDURE — 86140 C-REACTIVE PROTEIN: CPT | Performed by: STUDENT IN AN ORGANIZED HEALTH CARE EDUCATION/TRAINING PROGRAM

## 2021-06-23 PROCEDURE — 214N000001 HC R&B CCU UMMC

## 2021-06-23 PROCEDURE — 80053 COMPREHEN METABOLIC PANEL: CPT | Performed by: STUDENT IN AN ORGANIZED HEALTH CARE EDUCATION/TRAINING PROGRAM

## 2021-06-23 PROCEDURE — 80048 BASIC METABOLIC PNL TOTAL CA: CPT | Performed by: STUDENT IN AN ORGANIZED HEALTH CARE EDUCATION/TRAINING PROGRAM

## 2021-06-23 PROCEDURE — 36415 COLL VENOUS BLD VENIPUNCTURE: CPT | Performed by: STUDENT IN AN ORGANIZED HEALTH CARE EDUCATION/TRAINING PROGRAM

## 2021-06-23 PROCEDURE — 17250 CHEM CAUT OF GRANLTJ TISSUE: CPT

## 2021-06-23 PROCEDURE — 999N000128 HC STATISTIC PERIPHERAL IV START W/O US GUIDANCE

## 2021-06-23 PROCEDURE — 87040 BLOOD CULTURE FOR BACTERIA: CPT | Performed by: STUDENT IN AN ORGANIZED HEALTH CARE EDUCATION/TRAINING PROGRAM

## 2021-06-23 PROCEDURE — 250N000011 HC RX IP 250 OP 636: Performed by: STUDENT IN AN ORGANIZED HEALTH CARE EDUCATION/TRAINING PROGRAM

## 2021-06-23 PROCEDURE — 83735 ASSAY OF MAGNESIUM: CPT | Performed by: STUDENT IN AN ORGANIZED HEALTH CARE EDUCATION/TRAINING PROGRAM

## 2021-06-23 PROCEDURE — 85025 COMPLETE CBC W/AUTO DIFF WBC: CPT | Performed by: STUDENT IN AN ORGANIZED HEALTH CARE EDUCATION/TRAINING PROGRAM

## 2021-06-23 PROCEDURE — 250N000012 HC RX MED GY IP 250 OP 636 PS 637: Performed by: STUDENT IN AN ORGANIZED HEALTH CARE EDUCATION/TRAINING PROGRAM

## 2021-06-23 PROCEDURE — 250N000013 HC RX MED GY IP 250 OP 250 PS 637: Performed by: PHYSICIAN ASSISTANT

## 2021-06-23 PROCEDURE — 82977 ASSAY OF GGT: CPT | Performed by: STUDENT IN AN ORGANIZED HEALTH CARE EDUCATION/TRAINING PROGRAM

## 2021-06-23 RX ORDER — LIDOCAINE 40 MG/G
CREAM TOPICAL
Status: DISCONTINUED | OUTPATIENT
Start: 2021-06-23 | End: 2021-06-24 | Stop reason: HOSPADM

## 2021-06-23 RX ORDER — PROCHLORPERAZINE 25 MG
12.5 SUPPOSITORY, RECTAL RECTAL EVERY 12 HOURS PRN
Status: DISCONTINUED | OUTPATIENT
Start: 2021-06-23 | End: 2021-06-24 | Stop reason: HOSPADM

## 2021-06-23 RX ORDER — LISINOPRIL 10 MG/1
10 TABLET ORAL DAILY
Status: DISCONTINUED | OUTPATIENT
Start: 2021-06-24 | End: 2021-06-24 | Stop reason: HOSPADM

## 2021-06-23 RX ORDER — ASPIRIN 81 MG/1
81 TABLET, CHEWABLE ORAL DAILY
Status: DISCONTINUED | OUTPATIENT
Start: 2021-06-23 | End: 2021-06-24 | Stop reason: HOSPADM

## 2021-06-23 RX ORDER — MAGNESIUM HYDROXIDE/ALUMINUM HYDROXICE/SIMETHICONE 120; 1200; 1200 MG/30ML; MG/30ML; MG/30ML
30 SUSPENSION ORAL EVERY 4 HOURS PRN
Status: DISCONTINUED | OUTPATIENT
Start: 2021-06-23 | End: 2021-06-24 | Stop reason: HOSPADM

## 2021-06-23 RX ORDER — OXYCODONE HYDROCHLORIDE 5 MG/1
5-10 TABLET ORAL
Status: DISCONTINUED | OUTPATIENT
Start: 2021-06-23 | End: 2021-06-24 | Stop reason: HOSPADM

## 2021-06-23 RX ORDER — CARVEDILOL 3.12 MG/1
3.12 TABLET ORAL 2 TIMES DAILY WITH MEALS
Status: DISCONTINUED | OUTPATIENT
Start: 2021-06-23 | End: 2021-06-23

## 2021-06-23 RX ORDER — NITROGLYCERIN 0.4 MG/1
0.4 TABLET SUBLINGUAL EVERY 5 MIN PRN
Status: DISCONTINUED | OUTPATIENT
Start: 2021-06-23 | End: 2021-06-24 | Stop reason: HOSPADM

## 2021-06-23 RX ORDER — PREDNISONE 5 MG/1
5 TABLET ORAL DAILY
Status: DISCONTINUED | OUTPATIENT
Start: 2021-06-23 | End: 2021-06-24 | Stop reason: HOSPADM

## 2021-06-23 RX ORDER — FUROSEMIDE 10 MG/ML
40 INJECTION INTRAMUSCULAR; INTRAVENOUS ONCE
Status: COMPLETED | OUTPATIENT
Start: 2021-06-23 | End: 2021-06-23

## 2021-06-23 RX ORDER — LISINOPRIL 5 MG/1
5 TABLET ORAL DAILY
Status: DISCONTINUED | OUTPATIENT
Start: 2021-06-23 | End: 2021-06-23

## 2021-06-23 RX ORDER — BISACODYL 10 MG
10 SUPPOSITORY, RECTAL RECTAL DAILY PRN
Status: DISCONTINUED | OUTPATIENT
Start: 2021-06-23 | End: 2021-06-24 | Stop reason: HOSPADM

## 2021-06-23 RX ORDER — ISOSORBIDE MONONITRATE 60 MG/1
60 TABLET, EXTENDED RELEASE ORAL DAILY
Status: DISCONTINUED | OUTPATIENT
Start: 2021-06-23 | End: 2021-06-24 | Stop reason: HOSPADM

## 2021-06-23 RX ORDER — FLUTICASONE PROPIONATE 50 MCG
1 SPRAY, SUSPENSION (ML) NASAL DAILY
Status: DISCONTINUED | OUTPATIENT
Start: 2021-06-23 | End: 2021-06-24 | Stop reason: HOSPADM

## 2021-06-23 RX ORDER — CARVEDILOL 3.12 MG/1
3.12 TABLET ORAL 2 TIMES DAILY WITH MEALS
Status: DISCONTINUED | OUTPATIENT
Start: 2021-06-23 | End: 2021-06-24 | Stop reason: HOSPADM

## 2021-06-23 RX ORDER — PANTOPRAZOLE SODIUM 40 MG/1
40 TABLET, DELAYED RELEASE ORAL
Status: DISCONTINUED | OUTPATIENT
Start: 2021-06-23 | End: 2021-06-24 | Stop reason: HOSPADM

## 2021-06-23 RX ORDER — MYCOPHENOLIC ACID 360 MG/1
360 TABLET, DELAYED RELEASE ORAL
Status: DISCONTINUED | OUTPATIENT
Start: 2021-06-23 | End: 2021-06-24 | Stop reason: HOSPADM

## 2021-06-23 RX ORDER — TORSEMIDE 10 MG/1
10 TABLET ORAL DAILY
Status: DISCONTINUED | OUTPATIENT
Start: 2021-06-23 | End: 2021-06-23

## 2021-06-23 RX ORDER — ATORVASTATIN CALCIUM 80 MG/1
80 TABLET, FILM COATED ORAL DAILY
Status: DISCONTINUED | OUTPATIENT
Start: 2021-06-23 | End: 2021-06-24 | Stop reason: HOSPADM

## 2021-06-23 RX ORDER — TORSEMIDE 10 MG/1
10 TABLET ORAL DAILY
Status: DISCONTINUED | OUTPATIENT
Start: 2021-06-24 | End: 2021-06-24 | Stop reason: HOSPADM

## 2021-06-23 RX ORDER — FUROSEMIDE 20 MG
20 TABLET ORAL DAILY
Status: DISCONTINUED | OUTPATIENT
Start: 2021-06-23 | End: 2021-06-23

## 2021-06-23 RX ORDER — PROCHLORPERAZINE MALEATE 5 MG
5 TABLET ORAL EVERY 6 HOURS PRN
Status: DISCONTINUED | OUTPATIENT
Start: 2021-06-23 | End: 2021-06-24 | Stop reason: HOSPADM

## 2021-06-23 RX ORDER — ACETAMINOPHEN 325 MG/1
650 TABLET ORAL EVERY 4 HOURS PRN
Status: DISCONTINUED | OUTPATIENT
Start: 2021-06-23 | End: 2021-06-24 | Stop reason: HOSPADM

## 2021-06-23 RX ORDER — LABETALOL HYDROCHLORIDE 5 MG/ML
20 INJECTION, SOLUTION INTRAVENOUS EVERY 4 HOURS PRN
Status: DISCONTINUED | OUTPATIENT
Start: 2021-06-23 | End: 2021-06-24 | Stop reason: HOSPADM

## 2021-06-23 RX ADMIN — OXYCODONE HYDROCHLORIDE 5 MG: 5 TABLET ORAL at 11:06

## 2021-06-23 RX ADMIN — ATORVASTATIN CALCIUM 80 MG: 80 TABLET, FILM COATED ORAL at 08:20

## 2021-06-23 RX ADMIN — FLUTICASONE PROPIONATE 1 SPRAY: 50 SPRAY, METERED NASAL at 11:06

## 2021-06-23 RX ADMIN — ALUMINUM HYDROXIDE, MAGNESIUM HYDROXIDE, AND SIMETHICONE 30 ML: 200; 200; 20 SUSPENSION ORAL at 21:38

## 2021-06-23 RX ADMIN — LISINOPRIL 5 MG: 5 TABLET ORAL at 08:20

## 2021-06-23 RX ADMIN — PROCHLORPERAZINE MALEATE 5 MG: 5 TABLET ORAL at 19:47

## 2021-06-23 RX ADMIN — CARVEDILOL 3.12 MG: 3.12 TABLET, FILM COATED ORAL at 17:42

## 2021-06-23 RX ADMIN — SILVER NITRATE APPLICATORS 2 APPLICATOR: 25; 75 STICK TOPICAL at 12:26

## 2021-06-23 RX ADMIN — PANTOPRAZOLE SODIUM 40 MG: 40 TABLET, DELAYED RELEASE ORAL at 08:20

## 2021-06-23 RX ADMIN — MAGNESIUM HYDROXIDE 30 ML: 400 SUSPENSION ORAL at 12:01

## 2021-06-23 RX ADMIN — MYCOPHENILIC ACID 360 MG: 360 TABLET, DELAYED RELEASE ORAL at 08:20

## 2021-06-23 RX ADMIN — FUROSEMIDE 40 MG: 10 INJECTION, SOLUTION INTRAVENOUS at 04:05

## 2021-06-23 RX ADMIN — CALCIUM CARBONATE 600 MG (1,500 MG)-VITAMIN D3 400 UNIT TABLET 1 TABLET: at 19:48

## 2021-06-23 RX ADMIN — MYCOPHENILIC ACID 360 MG: 360 TABLET, DELAYED RELEASE ORAL at 17:42

## 2021-06-23 RX ADMIN — TICAGRELOR 90 MG: 90 TABLET ORAL at 19:48

## 2021-06-23 RX ADMIN — FUROSEMIDE 40 MG: 10 INJECTION, SOLUTION INTRAVENOUS at 10:17

## 2021-06-23 RX ADMIN — PREDNISONE 5 MG: 5 TABLET ORAL at 08:20

## 2021-06-23 RX ADMIN — ISOSORBIDE MONONITRATE 60 MG: 60 TABLET, EXTENDED RELEASE ORAL at 08:20

## 2021-06-23 RX ADMIN — CALCIUM CARBONATE 600 MG (1,500 MG)-VITAMIN D3 400 UNIT TABLET 1 TABLET: at 08:20

## 2021-06-23 RX ADMIN — TICAGRELOR 90 MG: 90 TABLET ORAL at 08:20

## 2021-06-23 RX ADMIN — ASPIRIN 81 MG CHEWABLE TABLET 81 MG: 81 TABLET CHEWABLE at 08:20

## 2021-06-23 ASSESSMENT — ACTIVITIES OF DAILY LIVING (ADL)
ADLS_ACUITY_SCORE: 16

## 2021-06-23 ASSESSMENT — MIFFLIN-ST. JEOR: SCORE: 871.08

## 2021-06-23 NOTE — PROGRESS NOTES
[facility]    Cardiology Progress Note- Cards 1      Date of Admission:  6/22/2021     Assessment & Plan: [facility]   Maribel Yang is a 69 y/o F with PMH of CKD s/p LDKT (2004), SCC of R lung s/p XRT (remission since 2014), anal cancer s/p chemo in 2018, AAA s/p EVAR with L-R femoral bypass graft on 4/26/21 c/b sepsis 2/2 R femoral groin access site and perigraft abscess, CAD with STEMI s/p LIUDMILA to RCA (4/7/21) and another STEMI post-operatively (4/23/21) attributed to coronary vasospasm, and HFrEF (EF 35-40%) secondary to ICM who presented to the ED with 1 week of progressive dyspnea on exertion and at rest.     Today's plan:  - CORE consult  - TTE  - Start torsemide 10 mg oral for diuresis  - Start carvedilol 3.125 mg oral BID  - Start lisinopril 10 mg daily   - Start spironolactone 12.5 mg oral daily  - Trend BMP  - Nutrition consult     # Respiratory distress 2/2 acute on chronic systolic heart failure  # HFrEF (EF 35-40%) 2/2 ischemic CM, NYHA class III  # CAD with STEMI s/p LIUDMILA to RCA (4/7/21)   # History of coronary vasospasm   # HTN   Presents with intermittent exertional dyspnea since hospitalization in April-May of this year, dyspnea had initially improved in May and patient was able to walk around without SOB. Recently over the past week dyspnea has worsened with SOB on exertion, at rest and while laying flat. At initial presentation to the ED, BNP elevated at 8K. Troponin negative x1, EKG with no significant findings. HFrEF likely secondary to ischemic cardiomyopathy given patient's history of two separate STEMIs in April of this year. Most recent ROLY (4/23/21) revealed moderately reduced LV function with EF 35-40%. Thinning and akinesis of the mid septal segment of the LV. Inferior and apical wall akinesis of the LV present. RV function, wall motion, thickness are normal. Last echo imaging before that was a dobutamine stress echo in 2012 which was  normal with EF 60-65% at rest. CXR revealed emphysematous changes of lungs. CT chest PE w contrast revealed emphysema with probable RUL scarring and radiation related change, severe CAD, 5.1 cm descending thoracic aortic aneurysm not significantly changed, and no PE. Patient is s/p Lasix 40 mg IV in ED.     I/Os: -1.692 L net negative since midnight   Weight: 85.5 lbs (unknown dry weight, patient notes she is usually 85-90 lbs)   - Start torsemide 10 mg oral daily for diuresis (consented and enrolled in TRANSFORM-HF study)  - TTE   - BB: START carvedilol 3.125 mg oral BID  - ACEi: START lisinopril 10 mg daily [PTA lisinopril 5 mg daily], due to persistent HTN with -160s o/n   - Aldosterone antagonist: START spironolactone 12.5 mg oral daily, monitor BMP for hyperkalemia (K>5.5)  - Consider starting Entresto and Jardiance 1-2 days before discharge for optimal GDMT, will discuss with patient   - Inotrope: none  - Sudden cardiac death ppx: none  - Trend BMP, strict I/Os, daily weights, salt and fluid restrictions   - Continuous O2 monitoring  - Telemetry  - CORE consult      # AMY on CKD s/p LDKT (2004)   Cr 1.31 yesterday and today, baseline 1-1.2, is making urine. GFR 42. AMY likely of cardiorenal etiology.   - Trend BMP  - Diurese as above   - Continue PTA mycophenolate 360 mg BID  - Continue PTA prednisone 5 mg daily  - Continue PTA Caltrate BID    # AAA s/p EVAR on 4/26/21  # PAD s/p L-R femoral bypass graft c/b MSSA bacteremia due to perigraft abscess Upon arrival to ED: afebrile, no leukocytosis, CRP 15 yesterday, 14 this AM. PAD symptoms improved and denied abdominal or back pain. Descending thoracic aortic aneurysm stable on CTA.  - Blood cultures pending: no growth     # Emphysema  # SCC s/p XRT   # Hx of tobacco use disorder   Mild obstruction with preserved FEV1 and normal lung volumes on PFTs (2014). Denies active tobacco use.  - Consider repeat as outpatient    # Elevated alkaline phosphatase -  resolved   Alk phos 175 on admission yesterday. Denies abd pain, LFTs otherwise wnl. Alk phos 150 today.     # DVT history   No concern for DVT at this time. SCDs for ppx.     # Malnutrition   # MGUS   Likely 2/2 recent infections, acute illness and hospitalizations. SPEP on 3/1/21 revealed very small monoclonal gammopathy.   - Nutrition consult     Diet: Renal  DVT Prophylaxis: SCDs   Villavicencio Catheter: Not present  Code Status:  Full code  Fluids: none  Lines: PIVs      Disposition Plan   Expected discharge: 2 - 3 days, recommended to prior living arrangement once fluid volume status optimized on oral medication and GDMT for HFrEF optimized.    The patient's care was discussed with the Attending Physician, Dr. Reynolds.    Julissa Hdz, MS4  [facility]    [facility]    Rob Cohn PA-C  [phone]  ______________________________________________________________________    Interval History   Patient is sitting comfortably in bed, appears to be in mild respiratory distress on RA. Denies SOB, PND, chest pain, or palpitations o/n.     Physical Exam   Vital Signs: Temp: 98.3  F (36.8  C) Temp src: Oral BP: (!) 140/86 Pulse: 84   Resp: 18 SpO2: 96 % O2 Device: None (Room air)    Weight: 85 lbs 8 oz  Constitutional: awake, alert, cooperative, no apparent distress, and appears stated age  Respiratory: mildly increased work of breathing at rest, diminished breath sounds BL, no crackles or wheezing  Cardiovascular: RRR, normal S1 and S2, no S3 or S4, and no murmur noted. No significant JVD appreciated.  Musculoskeletal: no lower extremity pitting edema present  Neurologic: Awake, alert, oriented to name, place and time.  Neuropsychiatric: General: normal, calm and normal eye contact    Data   Recent Labs   Lab 06/23/21  0555 06/22/21 2019   WBC 5.8 7.0   HGB 11.5* 12.7   MCV 90 90    381    134   POTASSIUM 3.4 4.3   CHLORIDE 104 100   CO2 23  24   BUN 29 30   CR 1.31* 1.31*   ANIONGAP 8 9   KAYKAY 9.1 9.9   GLC 95 90   ALBUMIN 2.8* 3.3*   PROTTOTAL 6.8 8.0   BILITOTAL 0.3 0.3   ALKPHOS 150 175*   ALT 15 19   AST 12 14   LIPASE  --  113   TROPI  --  0.015     Recent Results (from the past 24 hour(s))   XR Chest 2 Views    Narrative    Exam: XR CHEST 2 VW, 6/22/2021 5:36 PM    Indication: SOB    Comparison: 6/13/2021    Findings:   2 views of the chest are obtained demonstrating unremarkable soft  tissues and no acute bony abnormalities. Cardiomediastinal borders are  clear. Unchanged appearance of enlarged tortuous descending aorta. No  enlargement of the cardiac silhouette. No appreciable pneumothorax or  pleural effusions. Emphysema changes of the lungs. No new focal  airspace opacities.      Impression    Impression:   1. Emphysematous changes of lungs without acute airspace opacity.  2. Redemonstration of tortuous and enlarged thoracic aorta.    I have personally reviewed the examination and initial interpretation  and I agree with the findings.    VERNON PIMENTEL MD   CT Chest Pulmonary Embolism w Contrast    Narrative    EXAM: CT CHEST PULMONARY EMBOLISM W CONTRAST  LOCATION: [facility]  DATE/TIME: 6/22/2021 10:08 PM    INDICATION: History of squamous cell carcinoma at the right lung, presenting with shortness of breath and abdominal pain.  COMPARISON: 03/22/2021  TECHNIQUE: CT chest pulmonary angiogram during arterial phase injection of IV contrast. Multiplanar reformats and MIP reconstructions were performed. Dose reduction techniques were used.   CONTRAST: iopamidol (ISOVUE-370) solution 42 mL    FINDINGS:  ANGIOGRAM CHEST: No pulmonary embolus. Tortuous aneurysmal descending thoracic aorta measuring up to 5.1 cm, compared to 5 cm, unchanged with partial visualization of abdominal aortic stent graft. 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    LUNGS AND PLEURA: Emphysema. Spiculated right  upper lobe density, not significantly changed, consistent with post radiation change in the proper clinical context. No pneumothorax or pleural effusion.    MEDIASTINUM/AXILLAE: Normal size heart. Small pericardial effusion, increased since comparison study. No hilar or mediastinal lymphadenopathy.    CORONARY ARTERY CALCIFICATION: Severe.    UPPER ABDOMEN: Atrophic native kidneys.    MUSCULOSKELETAL: Normal.      Impression    IMPRESSION:  1.  No pulmonary embolus.  2.  5.1 cm descending thoracic aortic aneurysm, not significantly changed. Partially visualized abdominal aortic stent graft.  3.  Emphysema with probable right upper lobe scarring and radiation related change, recommend continued attention to detail on follow-up imaging.  4.  Severe coronary artery disease.  5.  Atrophic kidneys.     Medications     - MEDICATION INSTRUCTIONS -         aspirin  81 mg Oral Daily     atorvastatin  80 mg Oral Daily     calcium carbonate 600 mg-vitamin D 400 units  1 tablet Oral BID     carvedilol  3.125 mg Oral BID w/meals     fluticasone  1 spray Both Nostrils Daily     furosemide  20 mg Oral Daily     isosorbide mononitrate  60 mg Oral Daily     [START ON 6/24/2021] lisinopril  10 mg Oral Daily     mycophenolic acid  360 mg Oral BID IS     pantoprazole  40 mg Oral QAM AC     predniSONE  5 mg Oral Daily     sodium chloride (PF)  3 mL Intracatheter Q8H     ticagrelor  90 mg Oral Q12H
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
## 2021-06-23 NOTE — ED NOTES
[facility]   ED Nurse to Floor Handoff     Maribel Yang is a 70 year old female who speaks English and lives with family members,  in a home  They arrived in the ED by car from home    ED Chief Complaint: Shortness of Breath    ED Dx;   Final diagnoses:   Congestive heart failure, unspecified HF chronicity, unspecified heart failure type (H)         Needed?: No    Allergies:   Allergies   Allergen Reactions     Blood Transfusion Related (Informational Only) Other (See Comments)     Patient has a history of a clinically significant antibody against RBC antigens.  A delay in compatible RBCs may occur.     Ultracet Nausea and Vomiting and Hives     Hydrocodone Nausea and Vomiting and Hives   .  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, 2015    + HPV 56, 54,& 6, colp - TAL III, Leep =TAL II     Basal cell carcinoma      Congestive heart failure, unspecified HF chronicity, unspecified heart failure type (H) 6/22/2021     Depressive disorder July 2015     Hypertension      Immunosuppressed status (H)     due meds     Kidney replaced by transplant 9/04    Living donor recipient,  Rejection 7/2005     LSIL (low grade squamous intraepithelial lesion) on Pap smear 4/2013    +HPV 33 or 45, 61       PAD (peripheral artery disease) (H)      PONV (postoperative nausea and vomiting)      Squamous cell lung cancer (H)      Thrombosis of leg 1967     Unspecified disorder of kidney and ureter     X-linked dominant Alport's syndrome.      Baseline Mental status: WDL  Current Mental Status changes: at basesline    Infection present or suspected this encounter: no  Sepsis suspected: No  Isolation type: No active  isolations  Patient tested for COVID 19 prior to admission: YES     Activity level - Baseline/Home:  Stand with Assist  Activity Level - Current:   Stand with Assist    Bariatric equipment needed?: No    In the ED these meds were given:   Medications   sodium chloride (PF) 0.9% PF flush 75 mL (75 mLs Intravenous Given 6/22/21 2212)   iopamidol (ISOVUE-370) solution 42 mL (42 mLs Intravenous Given 6/22/21 2212)   0.9% sodium chloride BOLUS (0 mLs Intravenous Stopped 6/23/21 0011)       Drips running?  No    Home pump  No    Current LDAs  Peripheral IV 06/22/21 Right Upper arm (Active)   Number of days: 1       Negative Pressure Wound Therapy Groin Left (Active)   Number of days: 58       Negative Pressure Wound Therapy Groin Right (Active)   Number of days: 58       Wound Coccyx Other (comment) (Active)   Number of days: 58       Incision/Surgical Site 03/14/18 Rectum (Active)   Number of days: 1197       Incision/Surgical Site 04/24/21 Right Groin (Active)   Number of days: 60       Incision/Surgical Site 04/25/21 Left Groin (Active)   Number of days: 59       Labs results:   Labs Ordered and Resulted from Time of ED Arrival Up to the Time of Departure from the ED   CBC WITH PLATELETS DIFFERENTIAL - Abnormal; Notable for the following components:       Result Value    MCHC 31.0 (*)     RDW 16.9 (*)     Absolute Lymphocytes 0.4 (*)     All other components within normal limits   COMPREHENSIVE METABOLIC PANEL - Abnormal; Notable for the following components:    Creatinine 1.31 (*)     GFR Estimate 42 (*)     GFR Estimate If Black 48 (*)     Albumin 3.3 (*)     Alkaline Phosphatase 175 (*)     All other components within normal limits   LIPASE   TROPONIN I   SARS-COV-2 (COVID-19) VIRUS RT-PCR   UA MACROSCOPIC WITH REFLEX TO MICRO AND CULTURE       Imaging Studies:   Recent Results (from the past 24 hour(s))   XR Chest 2 Views    Narrative    Exam: XR CHEST 2 VW, 6/22/2021 5:36 PM    Indication: SOB    Comparison:  6/13/2021    Findings:   2 views of the chest are obtained demonstrating unremarkable soft  tissues and no acute bony abnormalities. Cardiomediastinal borders are  clear. Unchanged appearance of enlarged tortuous descending aorta. No  enlargement of the cardiac silhouette. No appreciable pneumothorax or  pleural effusions. Emphysema changes of the lungs. No new focal  airspace opacities.      Impression    Impression:   1. Emphysematous changes of lungs without acute airspace opacity.  2. Redemonstration of tortuous and enlarged thoracic aorta.    I have personally reviewed the examination and initial interpretation  and I agree with the findings.    VERNON PIMENTEL MD   CT Chest Pulmonary Embolism w Contrast    Narrative    EXAM: CT CHEST PULMONARY EMBOLISM W CONTRAST  LOCATION: [facility]  DATE/TIME: 6/22/2021 10:08 PM    INDICATION: History of squamous cell carcinoma at the right lung, presenting with shortness of breath and abdominal pain.  COMPARISON: 03/22/2021  TECHNIQUE: CT chest pulmonary angiogram during arterial phase injection of IV contrast. Multiplanar reformats and MIP reconstructions were performed. Dose reduction techniques were used.   CONTRAST: iopamidol (ISOVUE-370) solution 42 mL    FINDINGS:  ANGIOGRAM CHEST: No pulmonary embolus. Tortuous aneurysmal descending thoracic aorta measuring up to 5.1 cm, compared to 5 cm, unchanged with partial visualization of abdominal aortic stent graft. 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    LUNGS AND PLEURA: Emphysema. Spiculated right upper lobe density, not significantly changed, consistent with post radiation change in the proper clinical context. No pneumothorax or pleural effusion.    MEDIASTINUM/AXILLAE: Normal size heart. Small pericardial effusion, increased since comparison study. No hilar or mediastinal lymphadenopathy.    CORONARY ARTERY CALCIFICATION: Severe.    UPPER ABDOMEN: Atrophic  "native kidneys.    MUSCULOSKELETAL: Normal.      Impression    IMPRESSION:  1.  No pulmonary embolus.  2.  5.1 cm descending thoracic aortic aneurysm, not significantly changed. Partially visualized abdominal aortic stent graft.  3.  Emphysema with probable right upper lobe scarring and radiation related change, recommend continued attention to detail on follow-up imaging.  4.  Severe coronary artery disease.  5.  Atrophic kidneys.       Recent vital signs:   BP (!) 166/101   Pulse 82   Temp 98.4  F (36.9  C) (Oral)   Resp 20   Ht 1.575 m (5' 2\")   Wt 41.6 kg (91 lb 11.2 oz)   SpO2 100%   Breastfeeding No   BMI 16.77 kg/m      [place] Coma Scale Score: 15 (06/22/21 2325)       Cardiac Rhythm: Normal Sinus  Pt needs tele? No. SEE Epic ORDERS  Skin/wound Issues: None    Code Status: Full Code    Pain control: fair    Nausea control: fair    Abnormal labs/tests/findings requiring intervention: See results    Family present during ED course? Yes   Family Comments/Social Situation comments: NA    Tasks needing completion: None    Alivia Naylor RN  [facility] -- [phone] [place] [facility]  [phone] [facility]      "

## 2021-06-23 NOTE — PROGRESS NOTES
TRANSFORM-HF: TORSEMIDE COMPARISON WITH FUROSEMIDE FOR MANAGEMENT OF HEART FAILURE    PI: Guilherme Salinas M.D., Ph.D - Phone: [phone] Pager: [phone]  Primary : Anjana Pride RN - Phone: [phone] Pager: [phone]  : Nathalie Irizarry- Phone: [phone]    Patient was approached for possible participation for the above study. The current approved IRB consent form was discussed and explained to the patient.  It was discussed that involvement with the study is voluntary and refusal to participate would not involve penalty or decrease benefits at which the patient is entitled, and the subject may discontinue his/her involvement at any time without penalty or loss in benefits. The consent form was reviewed including purpose, research hypothesis, nature of the research, risk & benefits. Also reviewed were confidentiality issues, compensation for injury, and alternative procedures available. The patient was given time to review and ask any questions about the consent. Patient was shown contact information for PI and study staff in consent for future questions. Patient verbalized understanding of consent and study by restating the purpose, procedures, duration, risk, confidentiality of PHI, and voluntarily participation. Questions and concerns were addressed. Patient printed, signed and dated the consent and HIPAA form prior to study involvement. A copy was given to the patient for their records.     Subject Consent/HIPAA : [id number]    RANDOMIZED to: Torsemide

## 2021-06-23 NOTE — PLAN OF CARE
Problem: Adult Inpatient Plan of Care  Goal: Plan of Care Review  Outcome: Improving  Flowsheets (Taken 6/23/2021 1520)  Plan of Care Reviewed With: patient  Progress: improving  Goal: Absence of Hospital-Acquired Illness or Injury  Outcome: Improving  Intervention: Identify and Manage Fall Risk  Recent Flowsheet Documentation  Taken 6/23/2021 0800 by Miriam Seymour RN  Safety Promotion/Fall Prevention:   activity supervised   clutter free environment maintained   fall prevention program maintained   room door open   safety round/check completed  Intervention: Prevent Skin Injury  Recent Flowsheet Documentation  Taken 6/23/2021 0800 by Miriam Seymour RN  Body Position: position changed independently  Goal: Optimal Comfort and Wellbeing  Outcome: Improving     Problem: Adjustment to Illness (Heart Failure)  Goal: Optimal Coping  Outcome: Improving    Problem: Functional Ability Impaired (Heart Failure)  Goal: Optimal Functional Ability  Outcome: Improving  Intervention: Optimize Functional Ability  Recent Flowsheet Documentation  Taken 6/23/2021 0800 by Miriam Seymour RN  Activity Management:   activity adjusted per tolerance   activity encouraged     Problem: Oral Intake Inadequate (Heart Failure)  Goal: Optimal Nutrition Intake  Outcome: Improving     Problem: Respiratory Compromise (Heart Failure)  Goal: Effective Oxygenation and Ventilation  Outcome: No Change  Intervention: Promote Airway Secretion Clearance  Recent Flowsheet Documentation  Taken 6/23/2021 0800 by Miriam Seyomur RN  Cough And Deep Breathing: done with encouragement  Pt A&Ox4, able to make needs known. Pt ambulates with walker with standby assist, gait steady. VSS, see charting for details. Pt continues to have increased effort to breathe, O2 saturation WNL. Plan of care to diurese, monitor respiratory status, and encourage nutrition discussed with pt. Pt verbalized understanding. All questions and concerns addressed at this time. Will  continue to monitor and note all changes.

## 2021-06-23 NOTE — PLAN OF CARE
"Admission    Diagnosis: SOB  Admitted from: [facility]  Via: Cart  Accompanied by: N/A  Belongings: At bedside, declined sending any items to security (Gray shirt, torres sweat pants, gray socks, white shoes).  Admission Profile: Complete  Teaching: Orientation to unit, call don't fall, use of console, meal times, visiting hours, when to call for the RN (angina/sob/dizzyness, etc.), and enforced importance of safety   Access: PIV  Skin: 2 RN skin assessment completed with RN Torey BALDERRAMA, bilateral groin sites assessment pending C consult.  Telemetry: Placed on patient  Height/Weight: Complete    BP (!) 160/86   Pulse 77   Temp 98.2  F (36.8  C) (Oral)   Resp 18   Ht 1.575 m (5' 2\")   Wt 38.8 kg (85 lb 8 oz)   SpO2 99%   Breastfeeding No   BMI 15.64 kg/m      "

## 2021-06-23 NOTE — PROGRESS NOTES
[facility]   Patient is currently open to home care services with [facility]. The patient is currently receiving RN services. [facility]  and team have been notified of patient admission. [facility] liaison will continue to follow patient during stay. If appropriate provide orders to resume home care at time of discharge.    Galina Feng RN BSN  [facility] Liaison  [phone]

## 2021-06-23 NOTE — ED PROVIDER NOTES
ED Provider Note  [facility]      History     Chief Complaint   Patient presents with     Shortness of Breath     The history is provided by the patient and medical records.     Maribel Yang is a 70 year old female with a PMH notable for CKD (s/p kidney transplant-2004), CAD (s/p RCA stent-4/7/2021, AAA (recent EVAR with left right femoral bypass graft), and recent hospitalization for sepsis 2/2 right femoral groin access site and perigraft abscess (s/p washout, and sartorius muscle flap and redo fem-fem bypass with cadaveric artery-4/25/2021) c/b recent STEMI 2/2 CAD (s/p PCI-RCA stent-4/7/2021, EF 35-40% per echo on 4/26/2021, currently on Brilinta, aspirin, and Imdur), SCC of right lung (in remission since 2014) who presents to the ED today for evaluation of abnormal labs, shortness of breath, and abdominal pain. Patient had a virtual visit with vascular surgery today for routine wound follow-up. During the visit patient reported continued worsening dyspnea and fatigue. She had a BNP drawn today which was 8,875 and patient was advised to be seen in the ED because she could not see a cardiologist until August. She reports that she had been doing well after her AAA up until last week. She states that now when she walks short distances in her house she has to stop and lean against something because she becomes so out of breath. Patient reports that in the past week she also has difficulty breathing when she is resting. She states that she has had severe difficulty breathing when laying on her back. She reports that she is still able to go up and down stairs and walk around the house with her walker, but it is a lot harder recently for her to do everything. She denies chest pain, sweating, palpitations, cough, fever, or recent weight change. She also denies swelling in her legs, but notes she did have leg swelling when she had her AAA. Patient also notes LLQ abdominal pain that has  been there all day today. She states that she has been constipated in the past and took Miralax for 3 days and then began to go to the bathroom again. She reports that she did have a BM this morning, but she suspects she is starting to become constipated.    Per chart review patient was seen here in the ED 6/13 for a wound check. Patient did not have any systemic signs of infection. Labs showed normal CBC and lactic. Blood cultures were pending at discharge. Patient complained of some shortness of breath and fatigue so chest XR was obtained, which was normal. She was satting 98% on room air and vitally stable. Surgery was consulted and did not believe that the wounds were infected and recommended the patient be discharged home with outpatient follow-up.     Limited echocardiogram-4/26/2021  Interpretation Summary:  Moderately (EF 35-40%) reduced left ventricular function is present.  Hypokinesis of the inferior, basal anterior, basal inferoseptal and apical  walls with mid/basal septal aneurysm.  Right ventricular function, chamber size, wall motion, and thickness are  normal.  Small pericardial effusion, with a maximum diameter of 1.3 cm, adjacent to the  right atrium, with no hemodynamic significance.  A left pleural effusion is present.  This study was compared with the study from 4/24/2021. No significant changes  Noted.    Chest XR 6/13/21  Impression:   1. No new airspace opacity.  2. Redemonstration of tortuous and enlarged thoracic aorta.     Past Medical History  Past Medical History:   Diagnosis Date     Abnormal coagulation profile     p [id number] heterozygote      Age-related osteoporosis without current pathological fracture 6/22/2019     Anemia      Antiplatelet or antithrombotic long-term use      ASCUS with positive high risk HPV 2007, 2015    + HPV 56, 54,& 6, colp - TAL III, Leep =TAL II     Basal cell carcinoma      Congestive heart failure, unspecified HF chronicity, unspecified heart failure type  (H) 6/22/2021     Depressive disorder July 2015     Hypertension      Immunosuppressed status (H)     due meds     Kidney replaced by transplant 9/04    Living donor recipient,  Rejection 7/2005     LSIL (low grade squamous intraepithelial lesion) on Pap smear 4/2013    +HPV 33 or 45, 61       PAD (peripheral artery disease) (H)      PONV (postoperative nausea and vomiting)      Squamous cell lung cancer (H)      Thrombosis of leg 1967     Unspecified disorder of kidney and ureter     X-linked dominant Alport's syndrome.     Past Surgical History:   Procedure Laterality Date     BIOPSY      Kidney, Lung, Breast     BIOPSY ANAL N/A 03/14/2018    Procedure: BIOPSY ANAL;;  Surgeon: Shabbir Leo MD;  Location:  OR     BYPASS GRAFT FEMORAL FEMORAL Bilateral 04/25/2021    Procedure: Axplantation infected graft, femoral to femoral bypass with cadaveric artery, bilateral SATORIUS MUSCLE FLAP CREATION, evacuation of absece, vacuum closure;  Surgeon: Raven Lewis MD;  Location:  OR      TRANSPLANTATION OF KIDNEY  09/2004    recipient -- done at [facility]     COLONOSCOPY       COLONOSCOPY N/A 08/09/2017    Procedure: COMBINED COLONOSCOPY, SINGLE OR MULTIPLE BIOPSY/POLYPECTOMY BY BIOPSY;;  Surgeon: Sushil Hyatt MD;  Location:  GI     COLPOSCOPY,LOOP ELECTRD CERVIX EXCIS  03/11/2008    TAL II     CONIZATION LEEP  07/17/2013    Procedure: CONIZATION LEEP;;  Surgeon: Liliana Renteria MD;  Location:  OR     CONIZATION LEEP N/A 08/17/2016    Procedure: CONIZATION LEEP;  Surgeon: Liliana Renteria MD;  Location:  OR     CV CORONARY ANGIOGRAM N/A 04/06/2021    Procedure: CV CORONARY ANGIOGRAM;  Surgeon: Sincere Rosas MD;  Location:  HEART CARDIAC CATH LAB     CV CORONARY ANGIOGRAM N/A 04/25/2021    Procedure: Coronary Angiogram;  Surgeon: Sincere Rosas MD;  Location:  HEART CARDIAC CATH LAB     CV PCI STENT DRUG ELUTING N/A 04/06/2021    Procedure: Percutaneous Coronary  Intervention Stent Drug Eluting;  Surgeon: Sincere Rosas MD;  Location: [facility]     ENDOVASCULAR REPAIR ANEURYSM AORTOILIAC Bilateral 04/06/2021    Procedure: Endovascular Abdominal Aortic Aneurysm Repair with Aortouniiliac Device and Femoral-Femoral Artery Bypass with [id number] Artegraft;  Surgeon: Abena Ferrara MD;  Location: [facility] OR     EXAM UNDER ANESTHESIA ANUS  07/15/2014    Procedure: EXAM UNDER ANESTHESIA ANUS;  Surgeon: Radha Musa MD;  Location: [facility] OR     EXAM UNDER ANESTHESIA ANUS N/A 03/14/2018    Procedure: EXAM UNDER ANESTHESIA ANUS;  Anal Exam Under Anesthesia With Excision of anal lesion, proctoscopy;  Surgeon: Shabbir Leo MD;  Location: [facility] OR     EYE SURGERY       GENITOURINARY SURGERY       IR OR ANGIOGRAM  04/06/2021     IRRIGATION AND DEBRIDEMENT GROIN Right 04/24/2021    Procedure: IRRIGATION AND DEBRIDEMENT, INGUINAL REGION, I & D PF RIGHT GROIN;  Surgeon: Raven Lewis MD;  Location: [facility] OR     IRRIGATION AND DEBRIDEMENT GROIN N/A 04/26/2021    Procedure: IRRIGATION AND DEBRIDEMENT, INGUINAL REGION, PLACEMENT OF VERAFLO WOUND VAC, WOUND WASHOUT,;  Surgeon: Raven Lewis MD;  Location: [facility] OR     LASER CO2 EXCISE VULVA WIDE LOCAL  07/15/2014    Procedure: LASER CO2 EXCISE VULVA WIDE LOCAL;  Surgeon: Liliana Renteria MD;  [facility]: [facility]     LASER CO2 VAGINA  07/17/2013    Procedure: LASER CO2 VAGINA;;  Surgeon: Liliana Renteria MD;  [facility]     LASER CO2 VAGINA N/A 09/25/2018    Procedure: LASER CO2 VAGINA;  Exam Under Anesthesia, CO2 Laser Ablation of Upper Vagina and Cervix;  Surgeon: Pati Garcia MD;  Location: [facility] OR     MICROSCOPY ANAL  07/17/2013    Procedure: MICROSCOPY ANAL;  Anal Microscopy,  EUA vagina,Colposcopy Of Vagina And Vulva, Vaginal Biopsies, Omniguide Co2 Laser To Vagina and vulva, Loop Electrosurgical Excision Procedure To Cervix;  Surgeon: Radha Musa MD;  [facility]: [facility]     MICROSCOPY ANAL   07/15/2014    Procedure: MICROSCOPY ANAL;  Surgeon: Radha Musa MD;  [facility]     PICC DOUBLE LUMEN PLACEMENT Right 04/30/2021    39cm, Basilic vein     TRANSESOPHAGEAL ECHOCARDIOGRAM INTRAOPERATIVE N/A 04/28/2021    Procedure: ECHOCARDIOGRAM, TRANSESOPHAGEAL, INTRAOPERATIVE;  Surgeon: GENERIC ANESTHESIA PROVIDER;  Location: [facility] OR     VASCULAR SURGERY      Thrombectomy     Z NONSPECIFIC PROCEDURE      Thrombectomy     [facility] NONSPECIFIC PROCEDURE  1955 and 1959    Bilater eye surgery - correction for crossed eyes     [facility] NONSPECIFIC PROCEDURE  1998    oopherectomy L     [facility] NONSPECIFIC PROCEDURE  1967    open kidney biopsy - L     acetaminophen (TYLENOL) 325 MG tablet  aspirin (ASA) 81 MG chewable tablet  atorvastatin (LIPITOR) 80 MG tablet  calcium carbonate 600 mg-vitamin D 400 units (CALTRATE) 600-400 MG-UNIT per tablet  fluticasone (FLONASE) 50 MCG/ACT nasal spray  heparin lock flush 10 UNIT/ML SOLN injection  isosorbide mononitrate (IMDUR) 60 MG 24 hr tablet  lisinopril (ZESTRIL) 5 MG tablet  mycophenolic acid (GENERIC EQUIVALENT) 360 MG EC tablet  oxyCODONE (ROXICODONE) 5 MG tablet  pantoprazole (PROTONIX) 40 MG EC tablet  predniSONE (DELTASONE) 5 MG tablet  sodium chloride, PF, 0.9% PF flush  ticagrelor (BRILINTA) 90 MG tablet      Allergies   Allergen Reactions     Blood Transfusion Related (Informational Only) Other (See Comments)     Patient has a history of a clinically significant antibody against RBC antigens.  A delay in compatible RBCs may occur.     Ultracet Nausea and Vomiting and Hives     Hydrocodone Nausea and Vomiting and Hives     Family History  Family History   Problem Relation Age of Onset     Diabetes Father         type 2 diag age,60's     Alcohol/Drug Father      Arthritis Father      Hypertension Father      Lipids Father         high cholesterol     Arthritis Mother      Diabetes Mother      Depression Mother      Heart Disease Mother      Neurologic Disorder Mother       Obesity Mother      Psychotic Disorder Mother      Thyroid Disease Mother      Hypertension Mother      Gynecology Sister         Precancerous cell removal from cervix at age 45     Depression Sister      Allergies Sister      Alcohol/Drug Sister      Neurologic Disorder Sister      Cerebrovascular Disease Paternal Grandmother          of a stroke in her 80's     Diabetes Paternal Grandmother      Alcohol/Drug Son      Colon Polyps Sister      Breast Cancer Niece      Other Cancer Sister         Cervical     Obesity Sister      Depression Sister      Substance Abuse Son      Substance Abuse Sister      Asthma Other      Colon Cancer No family hx of      Crohn's Disease No family hx of      Ulcerative Colitis No family hx of      Melanoma No family hx of      Skin Cancer No family hx of      Social History   Social History     Tobacco Use     Smoking status: Former Smoker     Packs/day: 0.30     Years: 35.00     Pack years: 10.50     Types: Cigarettes     Start date: 1967     Smokeless tobacco: Never Used     Tobacco comment: Couple times a week. 1-2 cigs 1-2x a week.   Substance Use Topics     Alcohol use: Not Currently     Alcohol/week: 0.0 standard drinks     Frequency: Monthly or less     Drinks per session: 1 or 2     Binge frequency: Less than monthly     Comment: rarely     Drug use: Not Currently     Types: Marijuana      Past medical history, past surgical history, medications, allergies, family history, and social history were reviewed with the patient. No additional pertinent items.       Review of Systems   Constitutional: Positive for fatigue. Negative for fever and unexpected weight change.   Respiratory: Positive for shortness of breath. Negative for cough.    Cardiovascular: Negative for chest pain, palpitations and leg swelling.   Gastrointestinal: Positive for abdominal pain (LLQ).   All other systems reviewed and are negative.    A complete review of systems was performed with  "pertinent positives and negatives noted in the HPI, and all other systems negative.    Physical Exam   BP: 110/64  Pulse: 108  Temp: 97.4  F (36.3  C)  Resp: 16  Height: 157.5 cm (5' 2\")  Weight: 41.6 kg (91 lb 11.2 oz)  SpO2: 95 %  Physical Exam  Constitutional:       General: She is not in acute distress.     Appearance: She is cachectic. She is not diaphoretic.   HENT:      Head: Atraumatic.      Mouth/Throat:      Pharynx: No oropharyngeal exudate.   Eyes:      General: No scleral icterus.     Pupils: Pupils are equal, round, and reactive to light.   Cardiovascular:      Heart sounds: Normal heart sounds.   Pulmonary:      Effort: Tachypnea present. No respiratory distress.      Breath sounds: Decreased breath sounds present.   Abdominal:      General: Bowel sounds are normal.      Palpations: Abdomen is soft.      Tenderness: There is no abdominal tenderness.   Musculoskeletal:         General: No tenderness.   Skin:     General: Skin is warm.      Capillary Refill: Capillary refill takes less than 2 seconds.      Findings: No rash.   Neurological:      General: No focal deficit present.   Psychiatric:         Mood and Affect: Mood normal. Affect is tearful.         Behavior: Behavior normal.         ED Course      Procedures          EKG Interpretation:      Interpreted by Rock Oreilly MD  Time reviewed:   Symptoms at time of EKG: dyspnea   Rhythm: normal sinus   Rate: Normal  Axis: Normal  Ectopy: none  Conduction: normal  ST Segments/ T Waves: T wave inversion II, III, aVF, V4, V5 and V6  Q Waves: none  Comparison to prior: Unchanged    Clinical Impression: no acute changes                   Results for orders placed or performed during the hospital encounter of 06/22/21   XR Chest 2 Views     Status: None    Narrative    Exam: XR CHEST 2 VW, 6/22/2021 5:36 PM    Indication: SOB    Comparison: 6/13/2021    Findings:   2 views of the chest are obtained demonstrating unremarkable soft  tissues and no acute " bony abnormalities. Cardiomediastinal borders are  clear. Unchanged appearance of enlarged tortuous descending aorta. No  enlargement of the cardiac silhouette. No appreciable pneumothorax or  pleural effusions. Emphysema changes of the lungs. No new focal  airspace opacities.      Impression    Impression:   1. Emphysematous changes of lungs without acute airspace opacity.  2. Redemonstration of tortuous and enlarged thoracic aorta.    I have personally reviewed the examination and initial interpretation  and I agree with the findings.    VERNON PIMENTEL MD   CT Chest Pulmonary Embolism w Contrast     Status: None    Narrative    EXAM: CT CHEST PULMONARY EMBOLISM W CONTRAST  LOCATION: [facility]  DATE/TIME: 6/22/2021 10:08 PM    INDICATION: History of squamous cell carcinoma at the right lung, presenting with shortness of breath and abdominal pain.  COMPARISON: 03/22/2021  TECHNIQUE: CT chest pulmonary angiogram during arterial phase injection of IV contrast. Multiplanar reformats and MIP reconstructions were performed. Dose reduction techniques were used.   CONTRAST: iopamidol (ISOVUE-370) solution 42 mL    FINDINGS:  ANGIOGRAM CHEST: No pulmonary embolus. Tortuous aneurysmal descending thoracic aorta measuring up to 5.1 cm, compared to 5 cm, unchanged with partial visualization of abdominal aortic stent graft. 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    LUNGS AND PLEURA: Emphysema. Spiculated right upper lobe density, not significantly changed, consistent with post radiation change in the proper clinical context. No pneumothorax or pleural effusion.    MEDIASTINUM/AXILLAE: Normal size heart. Small pericardial effusion, increased since comparison study. No hilar or mediastinal lymphadenopathy.    CORONARY ARTERY CALCIFICATION: Severe.    UPPER ABDOMEN: Atrophic native kidneys.    MUSCULOSKELETAL: Normal.      Impression    IMPRESSION:  1.  No pulmonary  embolus.  2.  5.1 cm descending thoracic aortic aneurysm, not significantly changed. Partially visualized abdominal aortic stent graft.  3.  Emphysema with probable right upper lobe scarring and radiation related change, recommend continued attention to detail on follow-up imaging.  4.  Severe coronary artery disease.  5.  Atrophic kidneys.   CBC with platelets differential     Status: Abnormal   Result Value Ref Range    WBC 7.0 4.0 - 11.0 10e9/L    RBC Count 4.58 3.8 - 5.2 10e12/L    Hemoglobin 12.7 11.7 - 15.7 g/dL    Hematocrit 41.0 35.0 - 47.0 %    MCV 90 78 - 100 fl    MCH 27.7 26.5 - 33.0 pg    MCHC 31.0 (L) 31.5 - 36.5 g/dL    RDW 16.9 (H) 10.0 - 15.0 %    Platelet Count 381 150 - 450 10e9/L    Diff Method Automated Method     % Neutrophils 81.1 %    % Lymphocytes 6.3 %    % Monocytes 10.8 %    % Eosinophils 0.7 %    % Basophils 0.7 %    % Immature Granulocytes 0.4 %    Nucleated RBCs 0 0 /100    Absolute Neutrophil 5.7 1.6 - 8.3 10e9/L    Absolute Lymphocytes 0.4 (L) 0.8 - 5.3 10e9/L    Absolute Monocytes 0.8 0.0 - 1.3 10e9/L    Absolute Eosinophils 0.1 0.0 - 0.7 10e9/L    Absolute Basophils 0.1 0.0 - 0.2 10e9/L    Abs Immature Granulocytes 0.0 0 - 0.4 10e9/L    Absolute Nucleated RBC 0.0    Comprehensive metabolic panel     Status: Abnormal   Result Value Ref Range    Sodium 134 133 - 144 mmol/L    Potassium 4.3 3.4 - 5.3 mmol/L    Chloride 100 94 - 109 mmol/L    Carbon Dioxide 24 20 - 32 mmol/L    Anion Gap 9 3 - 14 mmol/L    Glucose 90 70 - 99 mg/dL    Urea Nitrogen 30 7 - 30 mg/dL    Creatinine 1.31 (H) 0.52 - 1.04 mg/dL    GFR Estimate 42 (L) >60 mL/min/[1.73_m2]    GFR Estimate If Black 48 (L) >60 mL/min/[1.73_m2]    Calcium 9.9 8.5 - 10.1 mg/dL    Bilirubin Total 0.3 0.2 - 1.3 mg/dL    Albumin 3.3 (L) 3.4 - 5.0 g/dL    Protein Total 8.0 6.8 - 8.8 g/dL    Alkaline Phosphatase 175 (H) 40 - 150 U/L    ALT 19 0 - 50 U/L    AST 14 0 - 45 U/L   Lipase     Status: None   Result Value Ref Range    Lipase 113  73 - 393 U/L   Troponin I     Status: None   Result Value Ref Range    Troponin I ES 0.015 0.000 - 0.045 ug/L   EKG 12-lead, tracing only     Status: None (Preliminary result)   Result Value Ref Range    Interpretation ECG Click View Image link to view waveform and result      Medications   sodium chloride (PF) 0.9% PF flush 75 mL (75 mLs Intravenous Given 6/22/21 2212)   iopamidol (ISOVUE-370) solution 42 mL (42 mLs Intravenous Given 6/22/21 2212)   0.9% sodium chloride BOLUS (0 mLs Intravenous Stopped 6/23/21 0011)        Assessments & Plan (with Medical Decision Making)     69 yo f here w/ dyspnea, worse than her baseline. Upon review of the EMR, pt has several echos showing reduced EF (35-40%) and no outpatient ed visits, cards in patient notes limited to consults for pre-op clearance. Essentially, hasn't really hard her CHF worked up or treated and now that she has worsening dyspnea, inability to sufficiently ambulate, needs admission.    I am unsure why she has no evidence of volume overload. Cachexia may contribute to that.    I have reviewed the nursing notes. I have reviewed the findings, diagnosis, plan and need for follow up with the patient.    New Prescriptions    No medications on file       Final diagnoses:   Congestive heart failure, unspecified HF chronicity, unspecified heart failure type (H)     I, Jackie Neff, am serving as a trained medical scribe to document services personally performed by Rock Oreilly DO, based on the provider's statements to me.     I, Rock Oreilly DO, was physically present and have reviewed and verified the accuracy of this note documented by Jackie Neff.    --  Rock Oreilly DO  [facility] EMERGENCY DEPARTMENT  6/22/2021     Rock rOeilly MD  06/23/21 0102

## 2021-06-23 NOTE — CONSULTS
WOC Nurse Inpatient Wound Assessment   Reason for consultation: Evaluate and treat bilateral groin wounds    Assessment  Bilateral groin wounds due to Surgical Wound  Status: initial assessment    Treatment Plan  Bilateral groin wounds: Every other day    Moisten hydrofera blue with saline prior to removing dressing if dry.   Cleanse wound with NS and pat dry.  Paint keri wound skin with no sting barrier film ([id number]) and allow to dry.  Cut a piece of hydrofera blue (order #[id number]) just large enough to cover wound bed.    Moisten with sterile NS and wring out excess.   For left groin attempt to pack one edge of hydrofera blue into deeper portion of wound.   Cover with mepilex dressing.    Silver Nitrate per WOC only.     Orders Written  Recommended provider order: Silver nitrate to bilateral groin wound hypergranulation tissue by WOC only  WO Nurse follow-up plan:twice weekly  Nursing to notify the Provider(s) and re-consult the WOC Nurse if wound(s) deteriorates or new skin concern.    Patient History  According to provider note(s):  Maribel Yang is a 70 year old female with a PMH notable for CKD s/p LDKT (2004), SCC of right lung s/p XRT (in remission since 2014), AAA s/p EVAR with left right femoral bypass graft c/b MSSA bacteremia due to perigraft abscess (s/p washout and course of cefazolin, EOT 6/8/21) c/b recent STEMI s/p LIUDMILA to RCA (4/7/2021) currently on DAPT, HFrEF (35-40% 4/26/2021) who presents to the ED today for evaluation of abnormal labs and shortness of breath likely secondary to acute HFrEF exacerbation.    Objective Data  Containment of urine/stool: Continent of bladder and Continent of bowel    Active Diet Order  Orders Placed This Encounter      Combination Diet Renal Diet; No Caffeine Diet; No Caffeine for 24 hours (once tests completed, may have caffeine)      Output:   I/O last 3 completed shifts:  In: 240 [P.O.:240]  Out: 700 [Urine:700]    Risk Assessment:   Sensory Perception:  "4-->no impairment  Moisture: 4-->rarely moist  Activity: 3-->walks occasionally  Mobility: 3-->slightly limited  Nutrition: 2-->probably inadequate  Friction and Shear: 2-->potential problem  Juan Luis Score: 18                          Labs:   Recent Labs   Lab 06/23/21  0555   ALBUMIN 2.8*   HGB 11.5*   WBC 5.8   CRP 14.0*       Physical Exam  Areas of skin assessed: focused Bilateral groin wounds      Right groin 6/23      Left groin 6/23    Wound History: Per Vascular surgery note from 6/22: \"71 yo F s/p EVAR AUI and left to right femoral to femoral bypass graft for 5.9 cm AAA. Initial post-op course was complicated by inferior STEMI requiring emergent cardiac catheterization and PCI to RCA.  She was readmitted to the hospital on 4/23/2021 for infection of her fem-fem bypass which was subsequently explanted and replaced with a homograft and closed with bilateral sartorius muscle flaps and placement of wound vacs to bilateral groins.  Post-operatively she experienced EKG changes and was found to have diffuse vasospasms that resolved.  She was discharged to TCU where our service was following.  She has since been discharged and her wound vacs have been removed.     Her wounds are healing well and are almost approximated.  She presented to the ED for concerns of drainage, however, that had resolved and she feels the wounds are progressing well\"      Wound Base: 95 % hypergranulation tissue 5% non granular tissue (on left groin wound only)     Palpation of the wound bed: normal      Drainage: small     Description of drainage: serosanguinous and green- green more so from left groin wound     Measurements (length x width x depth, in cm) Right: 2.1 x 1 x +0.2 cm and small scab 0.3 x 0.8 x 0.1cm Left: cm 0.5 x 1.5 x 0.3 in depth and +0.2 in height.      Tunneling: included in depth of left groin wound.   Periwound skin: intact      Color: normal and consistent with surrounding tissue      Temperature: normal   Odor: " none  Pain: denies , none  Pain intervention prior to dressing change: PO oxycodone prior to silver nitrate application    Interventions  Visual inspection and assessment completed   Wound Care Rationale Protect periwound skin, Improve absorptive capacity, Promote moist wound healing without tissue dehydration , Gently fill dead space to prevent abscess formation , Decrease bacterial load and decrease hypergranulation tissue  Wound Care: done per plan of care  Supplies: placed at the bedside, discussed with RN and discussed with patient  Current off-loading measures: Pillows  Current support surface: Standard  Atmos Air mattress  Education provided to: plan of care and wound progress  Discussed plan of care with Patient, Nurse and Physician    Alison Snow RN, CWOCN

## 2021-06-23 NOTE — PLAN OF CARE
Temp: 98.2  F (36.8  C) Temp src: Oral BP: (!) 160/86 Pulse: 77   Resp: 18 SpO2: 99 % O2 Device: None (Room air)       D: SOB. Hx CKD s/p LDKT 2004, SCC of right lung s/p radiation, CAD s/p PCI 4/2021, AAA s/p EVAR c/b sepsis 2/2 right femoral groin abscess s/p washout and muscle flap.    I/A: Monitored vitals and assessed pt status. A&O x4, tearful. SR. . RA. PIERCE. Denies pain. Bilateral groin sites CDI. 40mg IV lasix x1. Voiding in bedside commode. UA pending. Up SBA w/ walker. Sleeping between cares.    P: Plan for CTA chest/abd/pelvis. Continue to monitor and follow POC. Notify Cards 1 with changes.

## 2021-06-23 NOTE — PROGRESS NOTES
CLINICAL NUTRITION SERVICES - ASSESSMENT NOTE     Nutrition Prescription    RECOMMENDATIONS FOR MDs/PROVIDERS TO ORDER:  1. Rec modify diet to a low-sodium diet (3 g Na+, if able).  2. Order a multivitamin with minerals to help ensure micronutrient needs are met.   3. Rec home meal service offering low-sodium meals (such as [employer] or per provider, [facility]) once meal service through [facility] ends (in approximately two weeks after discharge, per pt).   4. Consider kcal counts.     Malnutrition Status:    Severe malnutrition in the context of acute illness/injury on chronic illness    Recommendations already ordered by Registered Dietitian (RD):  Oral supplements    Future/Additional Recommendations:  1. Monitor need for a fluid restriction.  2. Consider appetite stimulant, if indicated.   3. Consider bowel regimen (constipation PTA).   4. Monitor lytes (Phos, Mg++, and K+) for refeeding syndrome. If lytes trend low, aggressively replace. Note, urinary ketones were negative on 6/23.   5. Continue calcium/vitamin D twice daily due to prednisone.   6. If TFs become plan of care, would rec place feeding tube (FT) and initiate TFs, Osmolite 1.5 (concentrated, maintenance TF formula). Initiate TFs at 10 mL/hr, advancing rate by 10 mL Q 8 hrs (or per provider discretion, pending hemodynamic stability) to goal rate of 35 mL/hr. Osmolite 1.5 Shankar at goal of 35ml/hr  (840ml/day) will provide: 1260 kcals, 52 g PRO, 640 ml free H20, 171 g CHO, and 0 g fiber daily.         If begin tube feeds:    - Flush FT with 30 mL water Q 4 hrs for patency. Rec provider adjust free water flushes as needed, pending fluid status.   - Ensure K+/Mg++/Phos labs are ordered daily until TFs advance to goal infusion to evaluate for sx of refeeding with nutrition received. If lytes trend low, aggressively replace lytes. Ok to advance TF if K+ >3, Mg++ > 1.5,  and Phos > 1.9.   - If not already ordered, order a multivitamin/mineral (certavite 15  "mL/day via FT) to help ensure micronutrient needs being met with suspected hypermetabolic demands and potential interruptions to TF infusions.   - Monitor TF and possible need to adjust nutrition support regimen if necessary, pending medical course and nutrition status.       - Monitor need to check a metabolic cart study.     - Send a nutrition consult for \"Registered Dietitian to Order TF per Medical Nutrition Therapy Guidelines\" if desire RD to order TFs.       REASON FOR ASSESSMENT  Maribel Yang is a/an 70 year old female assessed by the dietitian for Provider Order - \"c/f malnutrition.\"    NUTRITION/ADDITIONAL HISTORY  Per RD note 5/3/21: \"Pt received heart healthy diet education on 4/7/21.  Pt was receiving Ensure Enlive (strawberry) and Magic Cup (gray) at [facility]. She reports she disliked the Ensure and liked the Magic Cup but it must be cold. Diet: Regular Intake/Tolerance: 100% of meals per flowsheet. On average, pt is ordering 1975 kcal and 90 g protein daily per [employer]. This is likely meeting 100% minimum energy and protein needs.Discussed menu ordering and snacks available on the unit. Pt filled out their menu and are finding foods they enjoy and feels she will get enough to eat during this admission. Encouraged pt to have dtr bring in snacks/food (she lives nearby) and pt may store in unit fridge if needed. Discussed oral nutrition supplements and they would like Magic Cup only at HS. Encouraged pt to put in the unit freezer if desired at a later time. She denied any questions or concerns at this time.\"  Per H & P, \"PMH notable for CKD s/p LDKT (2004), SCC of right lung s/p XRT (in remission since 2014), AAA s/p EVAR with left right femoral bypass graft c/b MSSA bacteremia due to perigraft abscess (s/p washout and course of cefazolin, EOT 6/8/21) c/b recent STEMI s/p LIUDMILA to RCA (4/7/2021) currently on DAPT, HFrEF (35-40% 4/26/2021) who presents to the ED today for evaluation of " "abnormal labs and shortness of breath likely secondary to acute HFrEF exacerbation. Patient reports approximately 1 week of worsening shortness of breath, fatigue. She denies any lower extremity swelling or worsening abdominal distention and does not track her weights regularly so she cannot comment on weight loss or weight gain. She currently lives at home with her  and has a home health nurse. cachectic female.\" Denies diarrhea (per chart, h/o diarrhea). Pt was ordered to take, noting, calcium carbonate + vitamin D twice daily, protonix, and prednisone PTA.   Pt was resting at times of visit but was able to answer questions. Pt states her appetite was \"ok\" PTA. She explained she eats less if fatigued and subsequently does not feel like making food. Has been receiving meals through [employer] but noted this will be ending soon. Otherwise, chooses easy to make foods such as peanut butter and jelly sandwiches has been adding butter to her veggies to increase kcals.     CURRENT NUTRITION ORDERS  Diet: Renal diet with no caffeine since 6/23.   Intake/Tolerance: Per nursing flowsheets (% intake), pt consumed 100% with a good appetite 6/23. Pt was just recently admitted.      LABS  Labs reviewed    MEDICATIONS  Medications reviewed    ANTHROPOMETRICS  Height: 157.5 cm (5' 2\")  Most Recent Weight: 38.8 kg (85 lb 8 oz)    IBW: 50 kg   BMI: Underweight BMI <18.5, and less than goal given 70 years of age. Body mass index is 15.64 kg/m .   Weight History: 45.5 kg (8/7/18), 45.8 kg (3/7/20), 42.2 kg (5/19/20), 43.3 kg (3/16/21), 40.8 kg (5/20/21), 41.6 kg (6/22/21, admit), 38.8 kg (6/23) - Pt has lost 5% of body wt over the last month. Wt loss may be due to, in part, fluid status changes and diuresis.   Dosing Weight: 39 kg (based on lowest wt so far this admission of 38.8 kg on 6/23)    ASSESSED NUTRITION NEEDS (for inpatient hospital stay)  Estimated Energy Needs: 6707-1008+ kcals/day (30 - 35+ kcals/kg)  Justification: " "Increased needs. Rec the highest end of this range due to wt status  Estimated Protein Needs: 47-59 grams protein/day (1.2 - 1.5+ grams of pro/kg)  Justification: Increased needs. Rec the high end of this range pending renal function.  Estimated Fluid Needs: 1112-3376 mL/day (1 mL/kcal)   Justification: Or per provider, pending fluid status    PHYSICAL FINDINGS/OTHER FINDINGS  See malnutrition section below.  6/23 WOC note: \"Bilateral groin wounds due to Surgical Wound Status: initial assessment.\" No note of pressure injury.  GI: Last stool on 6/22.    MALNUTRITION  % Intake: Difficult to assess as pt was just recently admitted.   % Weight Loss: > 5% in 1 month (severe) - Diuresis and fluid status changes may be contributing.   Subcutaneous Fat Loss: Facial region: Severe - per visualization, pt resting  Muscle Loss: Temporal:  Severe and Thoracic region (clavicle, acromium bone, deltoid, trapezius, pectoral):  Severe; Interosseous: Severe  Fluid Accumulation/Edema: Not meeting this criteria.     Malnutrition Diagnosis: Severe malnutrition in the context of acute illness/injury on chronic illness    NUTRITION DIAGNOSIS  Underweight related to inadequate oral intake and fatigue as evidenced by Body mass index is 15.64 kg/m .       INTERVENTIONS  Implementation  Nutrition Education: Importance of adequate nutrition intake discussed. Encouraged high protein/kcal options that are easy to prepare. Gave examples. Encouraged use of oral supplements currently and at home. Rec to limit added salt and high sodium items, as able. Gave handouts from the Nutrition Care Manual, \"Suggestions for Increasing Calories and Protein.\" Gave pt contact information for [facility].   Medical food supplement therapy: Discussed oral supplement options with pt. Ordered strawberry Ensure Enlive at 10:00 and HS, Ensure Clear at 14:00. Pt prefers cold oral supplements and would like these rather than Magic Cup which she may not want to eat upon " receiving.    Collaboration with other providers: Discussed pt with team.     Goals  Patient to consume % of nutritionally adequate meal trays TID, or the equivalent with supplements/snacks.     Monitoring/Evaluation  Progress toward goals will be monitored and evaluated per protocol.       Nutrition will continue to follow.      Nat Murray, MS, RD, LD, [facility]   6C Pgr: [phone]

## 2021-06-24 VITALS
HEART RATE: 87 BPM | HEIGHT: 62 IN | DIASTOLIC BLOOD PRESSURE: 83 MMHG | WEIGHT: 81.7 LBS | OXYGEN SATURATION: 99 % | SYSTOLIC BLOOD PRESSURE: 132 MMHG | TEMPERATURE: 98.1 F | BODY MASS INDEX: 15.04 KG/M2 | RESPIRATION RATE: 17 BRPM

## 2021-06-24 LAB
ALBUMIN SERPL-MCNC: 2.8 G/DL (ref 3.4–5)
ALP SERPL-CCNC: 139 U/L (ref 40–150)
ALT SERPL W P-5'-P-CCNC: 14 U/L (ref 0–50)
ANION GAP SERPL CALCULATED.3IONS-SCNC: 9 MMOL/L (ref 3–14)
AST SERPL W P-5'-P-CCNC: 9 U/L (ref 0–45)
BASOPHILS # BLD AUTO: 0.1 10E9/L (ref 0–0.2)
BASOPHILS NFR BLD AUTO: 1 %
BILIRUB SERPL-MCNC: 0.2 MG/DL (ref 0.2–1.3)
BUN SERPL-MCNC: 34 MG/DL (ref 7–30)
CALCIUM SERPL-MCNC: 9.6 MG/DL (ref 8.5–10.1)
CHLORIDE SERPL-SCNC: 102 MMOL/L (ref 94–109)
CO2 SERPL-SCNC: 25 MMOL/L (ref 20–32)
CREAT SERPL-MCNC: 1.38 MG/DL (ref 0.52–1.04)
CRP SERPL-MCNC: 14 MG/L (ref 0–8)
DIFFERENTIAL METHOD BLD: ABNORMAL
EOSINOPHIL # BLD AUTO: 0.3 10E9/L (ref 0–0.7)
EOSINOPHIL NFR BLD AUTO: 4.4 %
ERYTHROCYTE [DISTWIDTH] IN BLOOD BY AUTOMATED COUNT: 16.9 % (ref 10–15)
GFR SERPL CREATININE-BSD FRML MDRD: 39 ML/MIN/{1.73_M2}
GLUCOSE SERPL-MCNC: 86 MG/DL (ref 70–99)
HCT VFR BLD AUTO: 35.3 % (ref 35–47)
HGB BLD-MCNC: 10.8 G/DL (ref 11.7–15.7)
IMM GRANULOCYTES # BLD: 0 10E9/L (ref 0–0.4)
IMM GRANULOCYTES NFR BLD: 0.3 %
LYMPHOCYTES # BLD AUTO: 0.6 10E9/L (ref 0.8–5.3)
LYMPHOCYTES NFR BLD AUTO: 8.7 %
MAGNESIUM SERPL-MCNC: 2 MG/DL (ref 1.6–2.3)
MCH RBC QN AUTO: 27.3 PG (ref 26.5–33)
MCHC RBC AUTO-ENTMCNC: 30.6 G/DL (ref 31.5–36.5)
MCV RBC AUTO: 89 FL (ref 78–100)
MONOCYTES # BLD AUTO: 0.8 10E9/L (ref 0–1.3)
MONOCYTES NFR BLD AUTO: 12.2 %
NEUTROPHILS # BLD AUTO: 5.1 10E9/L (ref 1.6–8.3)
NEUTROPHILS NFR BLD AUTO: 73.4 %
NRBC # BLD AUTO: 0 10*3/UL
NRBC BLD AUTO-RTO: 0 /100
PLATELET # BLD AUTO: 335 10E9/L (ref 150–450)
POTASSIUM SERPL-SCNC: 3.7 MMOL/L (ref 3.4–5.3)
PROT SERPL-MCNC: 6.7 G/DL (ref 6.8–8.8)
RBC # BLD AUTO: 3.96 10E12/L (ref 3.8–5.2)
SODIUM SERPL-SCNC: 136 MMOL/L (ref 133–144)
WBC # BLD AUTO: 6.9 10E9/L (ref 4–11)

## 2021-06-24 PROCEDURE — 85025 COMPLETE CBC W/AUTO DIFF WBC: CPT | Performed by: STUDENT IN AN ORGANIZED HEALTH CARE EDUCATION/TRAINING PROGRAM

## 2021-06-24 PROCEDURE — 36415 COLL VENOUS BLD VENIPUNCTURE: CPT | Performed by: STUDENT IN AN ORGANIZED HEALTH CARE EDUCATION/TRAINING PROGRAM

## 2021-06-24 PROCEDURE — 86140 C-REACTIVE PROTEIN: CPT | Performed by: STUDENT IN AN ORGANIZED HEALTH CARE EDUCATION/TRAINING PROGRAM

## 2021-06-24 PROCEDURE — 99239 HOSP IP/OBS DSCHRG MGMT >30: CPT | Mod: GC | Performed by: INTERNAL MEDICINE

## 2021-06-24 PROCEDURE — 83735 ASSAY OF MAGNESIUM: CPT | Performed by: STUDENT IN AN ORGANIZED HEALTH CARE EDUCATION/TRAINING PROGRAM

## 2021-06-24 PROCEDURE — 250N000012 HC RX MED GY IP 250 OP 636 PS 637: Performed by: STUDENT IN AN ORGANIZED HEALTH CARE EDUCATION/TRAINING PROGRAM

## 2021-06-24 PROCEDURE — 250N000013 HC RX MED GY IP 250 OP 250 PS 637: Performed by: PHYSICIAN ASSISTANT

## 2021-06-24 PROCEDURE — 80053 COMPREHEN METABOLIC PANEL: CPT | Performed by: STUDENT IN AN ORGANIZED HEALTH CARE EDUCATION/TRAINING PROGRAM

## 2021-06-24 PROCEDURE — 250N000013 HC RX MED GY IP 250 OP 250 PS 637: Performed by: STUDENT IN AN ORGANIZED HEALTH CARE EDUCATION/TRAINING PROGRAM

## 2021-06-24 RX ORDER — LISINOPRIL 10 MG/1
10 TABLET ORAL DAILY
Qty: 90 TABLET | Refills: 3 | Status: SHIPPED | OUTPATIENT
Start: 2021-06-24 | End: 2021-08-30

## 2021-06-24 RX ORDER — CARVEDILOL 3.12 MG/1
3.12 TABLET ORAL 2 TIMES DAILY WITH MEALS
Qty: 180 TABLET | Refills: 3 | Status: SHIPPED | OUTPATIENT
Start: 2021-06-24 | End: 2021-07-02 | Stop reason: ALTCHOICE

## 2021-06-24 RX ORDER — TORSEMIDE 10 MG/1
10 TABLET ORAL DAILY
Qty: 90 TABLET | Refills: 3 | Status: SHIPPED | OUTPATIENT
Start: 2021-06-24 | End: 2021-07-02

## 2021-06-24 RX ADMIN — PANTOPRAZOLE SODIUM 40 MG: 40 TABLET, DELAYED RELEASE ORAL at 08:03

## 2021-06-24 RX ADMIN — LISINOPRIL 10 MG: 10 TABLET ORAL at 08:03

## 2021-06-24 RX ADMIN — TORSEMIDE 10 MG: 10 TABLET ORAL at 08:03

## 2021-06-24 RX ADMIN — MAGNESIUM HYDROXIDE 30 ML: 400 SUSPENSION ORAL at 08:07

## 2021-06-24 RX ADMIN — CARVEDILOL 3.12 MG: 3.12 TABLET, FILM COATED ORAL at 08:03

## 2021-06-24 RX ADMIN — ISOSORBIDE MONONITRATE 60 MG: 60 TABLET, EXTENDED RELEASE ORAL at 08:03

## 2021-06-24 RX ADMIN — TICAGRELOR 90 MG: 90 TABLET ORAL at 08:04

## 2021-06-24 RX ADMIN — CALCIUM CARBONATE 600 MG (1,500 MG)-VITAMIN D3 400 UNIT TABLET 1 TABLET: at 08:04

## 2021-06-24 RX ADMIN — MYCOPHENILIC ACID 360 MG: 360 TABLET, DELAYED RELEASE ORAL at 08:04

## 2021-06-24 RX ADMIN — ATORVASTATIN CALCIUM 80 MG: 80 TABLET, FILM COATED ORAL at 08:03

## 2021-06-24 RX ADMIN — FLUTICASONE PROPIONATE 1 SPRAY: 50 SPRAY, METERED NASAL at 08:03

## 2021-06-24 RX ADMIN — PREDNISONE 5 MG: 5 TABLET ORAL at 08:03

## 2021-06-24 RX ADMIN — ASPIRIN 81 MG CHEWABLE TABLET 81 MG: 81 TABLET CHEWABLE at 08:03

## 2021-06-24 ASSESSMENT — ACTIVITIES OF DAILY LIVING (ADL)
ADLS_ACUITY_SCORE: 14
ADLS_ACUITY_SCORE: 16

## 2021-06-24 ASSESSMENT — MIFFLIN-ST. JEOR: SCORE: 853.84

## 2021-06-24 NOTE — PLAN OF CARE
[id number] 6/24/2021    Patient is a 70 year old female admitted for CHF with a PMH of HTN, PAD, cancer, STEMI, AAA repair, and hematoma. Patients team is cards 1.    Neuro: A&Ox4  Cardiac: WDL; in sinus rhtythm denies chest pain; pulses all palpable; PCD pumps worn when in bed  Respiratory: WDL; on room air; no SOB; clear lung sounds  GI/: WDL; bowel sounds active x4; last BM yesterday but patient feels it wasn't sufficient, Milk of Mag given this AM PRN  Endocrine: WDL  Diet/Appetite: 2 gram sodium; no fluid restriction   Skin: Groin access sites on left and right sides (dressing changed every other day, changed 6/23); skin otherwise elastic and intact  LDA: Right PIV (saline locked)  Activity: Independent   Pain: No complaints of pain  Plan: Discharge back home today

## 2021-06-24 NOTE — CONSULTS
"70 year old female presenting with SOB. CORE consult requested. Rocio is currently admitted with HFrEF exacerbation secondary to ICM, recent STEMI s/p LIUDMILA to RCA 2021.    Maribel was provided with a CHF book.  I reviewed the importance of daily weights, 2 gm sodium diet, 2L fluid restriction and compliance with medications upon hospital discharge.  CORE contact phone numbers provided and patient is encouraged to call with any questions or concerns, including any weight gain or loss of 2 or more pounds in 24 hours or 5 or more pounds in 1 week.      Maribel describes breathlessness as her main symptom of fluid overload.  She is a very small lady and a change in weight will be large for her.  We discussed looking for the subtle signs of volume overload.  Maribel currently doesn't have a scale at home and we discussed buying a cheap digital one and beginning to log her weights daily.      Appointment made in CORE clinic on 21 with Marguerite Muñoz at 11:30, labs at 11am.  I will follow-up with the patient at that time, sooner if needed.  Thank you for the consult.    Kell Landaverde RN BSN CHFN  Cardiology Care Coordinator - [facility]  Questions and [phone]  First press #1 for the [place] and then press #3 for \"Medical Advise\" to reach us Cardiology Nurses.     "

## 2021-06-24 NOTE — DISCHARGE SUMMARY
Discharge    Discharged to: Home in [place]  Via: Car with daughter Kevin   Accompanied by: Kevin  Belongings: Sent with patient  Teaching: For HF and daily weights  Clinic appointment: 6/25 PCP follow up  Report called/faxed: N/A  Tele and IV: Discontinued

## 2021-06-24 NOTE — DISCHARGE SUMMARY
[address] [address]  p: [phone]    Discharge Summary: Cardiology Service    Maribel Yang MRN# [id number]   YOB: 1950 Age: 70 year old       Admission Date: 6/22/2021  Discharge Date: 06/24/21    Discharge Diagnoses:  # Respiratory distress 2/2 acute on chronic systolic heart failure  # HFrEF (EF 35-40%) 2/2 ischemic CM, NYHA class III  # CAD with STEMI s/p LIUDMILA to RCA (4/7/21)   # History of coronary vasospasm   # HTN   # AMY on CKD s/p LDKT (2004)   # AAA s/p EVAR on 4/26/21  # PAD s/p L-R femoral bypass graft c/b MSSA bacteremia due to perigraft abscess   # Emphysema  # SCC s/p XRT   # Hx of tobacco use disorder   # Elevated alkaline phosphatase - resolved   # DVT history   # Malnutrition   # MGUS     Brief HPI:  Maribel Yang is a 69 y/o F with PMH of CKD s/p LDKT (2004), SCC of R lung s/p XRT (remission since 2014), anal cancer s/p chemo in 2018, AAA s/p EVAR with L-R femoral bypass graft on 4/26/21 c/b sepsis 2/2 R femoral groin access site and perigraft abscess, CAD with STEMI s/p LIUDMILA to RCA (4/7/21) and another STEMI post-operatively (4/23/21) attributed to coronary vasospasm, and HFrEF (EF 35-40%) secondary to ICM who presented to the ED with 1 week of progressive dyspnea on exertion and at rest.     Hospital Course by Diagnosis:  # Respiratory distress 2/2 acute on chronic systolic heart failure  # HFrEF (EF 30-35%) 2/2 ischemic CM, NYHA class III  # CAD with STEMI s/p LIUDMILA to RCA (4/7/21)   # History of coronary vasospasm   # HTN   Patient presented to ED with 1 week of worsening dyspnea at exertion and at rest.  Noted intermittent exertional dyspnea since hospitalization in April-May of this year, dyspnea had initially improved in May but has now worsened. At presentation, BNP elevated at 8K. Troponin negative x1, EKG with no significant findings. Last ROLY (4/23/21) revealed moderately reduced LV function  with EF 35-40%, akinesis mid septum, inferior, apical wall. HFrEF likely secondary to ischemic cardiomyopathy given patient's history of two separate STEMIs in April of this year. CXR revealed emphysematous changes of lungs, no evidence of pulmonary edema. CT chest PE w contrast revealed emphysema with probable RUL scarring and radiation related change, severe CAD, 5.1 cm descending thoracic aortic aneurysm not significantly changed, and no PE.    Patient weighed 91.7 lbs on admission, 81.7 lbs on discharge.      - Diuretic: Torsemide 10 mg oral daily (in TRANSFORM)  - BB: Carvedilol 3.125 mg oral BID   - ACEi: Increased PTA Lisinopril to 10 mg daily  - Aldosterone antagonist: Start Aldactone as OP if BP, Cr allows  - ARNI: none, not affordable   - SGLT2i: none, not affordable   - Patient to follow up with CORE for further management of HFrEF     # AMY on CKD s/p LDKT in 2004   Cr 1.31 -> 1.35 -> 1.38. GFR 23 on discharge.   - Continue PTA mycophenolate 360 mg BID  - Continue PTA prednisone 5 mg daily  - Continue PTA Caltrate BID    # AAA s/p EVAR on 4/26/21  # PAD s/p L-R femoral bypass graft c/b MSSA bacteremia due to perigraft abscess   Upon arrival to ED, patient was afebrile, no leukocytosis, CRP in 14-15 range. Given patient's age, upper limit of normal for CRP is 19.6 mg/L. PAD symptoms improved and denied abdominal or back pain. Descending thoracic aortic aneurysm stable on CTA.     # Emphysema  # SCC s/p XRT   # Hx of tobacco use disorder   Mild obstruction with preserved FEV1 and normal lung volumes on PFTs in 2014. Denies active tobacco use. During hospitalization, CXR and CT chest PE revealed emphysema.  - Consider repeat PFTs as outpatient.    # Elevated alkaline phosphatase - resolved   Alk phos 175 on admission. Denies abd pain, LFTs otherwise wnl. Alk phos 150, resolved after one day.      # Malnutrition   # MGUS   Patient appears cachetic, c/f malnutrition 2/2 recent infections, acute illness and  hospitalizations. SPEP on 3/1/21 revealed very small monoclonal gammopathy.   - Nutrition was consulted; pt on 3gm Na diet, daily multivitamin with minerals  - Pt previously enrolled in meal service offering low-sodium meals once meal service through [facility] (in ~2 weeks after discharge per pt). Patient was given information and signed up for [facility] meal service.     # Biliateral groin wounds 2/2 vascular surgery  Patient was admitted to the hospital on 4/23/2021 for infection of her fem-fem bypass which was subsequently explanted and replaced with a homograft and closed with bilateral sartorius muscle flaps and placement of wound vacs to bilateral groins. Patient was discharged to TCU and her wound vacs were removed at that time. Wound was consulted for management during hospital stay. Blood cultures with NGTD  - No further monitoring needed at this time    Consults:  Nutrition  Wound      Medication Changes:  - START Torsemide 10 mg oral daily  - START Carvedilol 3.125 mg oral BID   - MODIFY Lisinopril 10 mg daily [PTA lisinopril 5 mg daily]    Discharge medications:   Current Discharge Medication List      START taking these medications    Details   carvedilol (COREG) 3.125 MG tablet Take 1 tablet (3.125 mg) by mouth 2 times daily (with meals)  Qty: 180 tablet, Refills: 3    Associated Diagnoses: Congestive heart failure, unspecified HF chronicity, unspecified heart failure type (H)      torsemide (DEMADEX) 10 MG tablet Take 1 tablet (10 mg) by mouth daily  Qty: 90 tablet, Refills: 3    Associated Diagnoses: Congestive heart failure, unspecified HF chronicity, unspecified heart failure type (H)         CONTINUE these medications which have CHANGED    Details   lisinopril (ZESTRIL) 10 MG tablet Take 1 tablet (10 mg) by mouth daily  Qty: 90 tablet, Refills: 3    Associated Diagnoses: Abdominal aortic aneurysm (AAA) without rupture (H); ST elevation myocardial infarction (STEMI), unspecified artery (H);  Peripheral artery disease (H)         CONTINUE these medications which have NOT CHANGED    Details   acetaminophen (TYLENOL) 325 MG tablet Take 2 tablets (650 mg) by mouth every 4 hours as needed for other (For optimal non-opioid multimodal pain management to improve pain control.)  Qty: 100 tablet, Refills: 3    Associated Diagnoses: Abdominal aortic aneurysm (AAA) without rupture (H); ST elevation myocardial infarction (STEMI), unspecified artery (H); Peripheral artery disease (H)      aspirin (ASA) 81 MG chewable tablet Take 1 tablet (81 mg) by mouth daily  Qty: 90 tablet, Refills: 3    Associated Diagnoses: Abdominal aortic aneurysm (AAA) without rupture (H); ST elevation myocardial infarction (STEMI), unspecified artery (H); Peripheral artery disease (H)      atorvastatin (LIPITOR) 80 MG tablet Take 1 tablet (80 mg) by mouth daily  Qty: 60 tablet, Refills: 4    Associated Diagnoses: Abdominal aortic aneurysm (AAA) without rupture (H); ST elevation myocardial infarction (STEMI), unspecified artery (H); Peripheral artery disease (H)      calcium carbonate 600 mg-vitamin D 400 units (CALTRATE) 600-400 MG-UNIT per tablet Take 1 tablet by mouth 2 times daily  Qty: 120 tablet, Refills: 3    Associated Diagnoses: Kidney replaced by transplant      fluticasone (FLONASE) 50 MCG/ACT nasal spray Spray 1 spray into both nostrils daily  Qty: 18.2 mL, Refills: 3    Associated Diagnoses: Chronic rhinitis      isosorbide mononitrate (IMDUR) 60 MG 24 hr tablet Take 1 tablet (60 mg) by mouth daily  Qty: 60 tablet, Refills: 4    Associated Diagnoses: Coronary artery vasospasm (H); Abdominal aortic aneurysm (AAA) without rupture (H); ST elevation myocardial infarction involving right coronary artery (H); Wound infection after surgery      mycophenolic acid (GENERIC EQUIVALENT) 360 MG EC tablet Take 1 tablet (360 mg) by mouth 2 times daily  Qty: 60 tablet, Refills: 3    Associated Diagnoses: Kidney replaced by transplant; Kidney  transplanted; Aftercare following organ transplant; Immunosuppressive management encounter following kidney transplant      pantoprazole (PROTONIX) 40 MG EC tablet Take 1 tablet (40 mg) by mouth every morning (before breakfast)  Qty: 60 tablet, Refills: 3    Associated Diagnoses: Wound infection after surgery      predniSONE (DELTASONE) 5 MG tablet Take 1 tablet (5 mg) by mouth daily  Qty: 90 tablet, Refills: 3    Associated Diagnoses: Kidney replaced by transplant      ticagrelor (BRILINTA) 90 MG tablet Take 1 tablet (90 mg) by mouth every 12 hours  Qty: 60 tablet, Refills: 4    Associated Diagnoses: Abdominal aortic aneurysm (AAA) without rupture (H); ST elevation myocardial infarction (STEMI), unspecified artery (H); Peripheral artery disease (H)         STOP taking these medications       heparin lock flush 10 UNIT/ML SOLN injection Comments:   Reason for Stopping:         oxyCODONE (ROXICODONE) 5 MG tablet Comments:   Reason for Stopping:         sodium chloride, PF, 0.9% PF flush Comments:   Reason for Stopping:               Follow-up:  - PCP for hospitalization follow-up  - CORE clinic for HFrEF management     Labs or imaging requiring follow-up after discharge:  BMP     Code status:  Full code     Condition on discharge  Temp:  [96.9  F (36.1  C)-98.3  F (36.8  C)] 97.7  F (36.5  C)  Pulse:  [64-96] 64  Resp:  [16-18] 16  BP: (106-140)/(58-95) 137/95  SpO2:  [95 %-98 %] 97 %  General: Alert, interactive, NAD  Eyes: sclera anicteric, EOMI  Neck: JVP flat, carotid 2+ bilaterally  Cardiovascular: RRR, normal S1 and S2, no murmurs, gallops, or rubs  Resp: decreased breath sounds BL, no rales, wheezes, or rhonchi  GI: Soft, nontender, nondistended. +BS.  No HSM or masses, no rebound or guarding.  Extremities: no LE edema, no cyanosis or clubbing  Skin: Warm and dry, no jaundice or rash  Neuro: CN 2-12 intact, moves all extremities equally  Psych: Alert & oriented x 3    Imaging with results:  Echocardiogram  6/23/21:  Interpretation Summary  The Ejection Fraction is estimated at 30-35%.  Right ventricular function, chamber size, wall motion, and thickness are  normal.  2 cm pericardial effusion along RV free wall with organizing material in  pericardial cavity. No hemodynamic compromise. Small IVC size.  No significant changes noted.    Patient Care Team:  Lisa Martinez DO as PCP - General (Family Practice)  Hitesh See MD as MD (Nephrology)  Nyasia Gaines MD as Referring Physician (OB/Gyn)  Joel Davis MD as MD (Family Practice)  Rosalie Pelletier PA-C as Physician Assistant (Physician Assistant)  Lisa Martinez DO as Assigned PCP  Liliana Renteria MD as MD (Oncology)  Leelee Evans MD as MD (INTERNAL MEDICINE - ENDOCRINOLOGY, DIABETES & METABOLISM)  Juan Rene MD as MD (Medical Oncology)  Melody Mejia, RN as Specialty Care Coordinator (Hematology & Oncology)  Tyler Tomas PA-C as Assigned Cancer Care Provider  Abena Ferrara MD as Assigned Heart and Vascular Provider  Greg Weber MD as Assigned Surgical Provider  Marquise Wilkerson MD as MD (Cardiovascular Disease)  Marquise Wilkerson MD as MD (Cardiovascular Disease)  Jessica Bunn NP as Referring Physician (Vascular Surgery)    45 minutes spent in discharge, including >50% in counseling and coordination of care, medication review and plan of care recommended on follow up. Questions were answered.   It was our pleasure to care for Maribel during this hospitalization. Please do not hesitate to contact me should there be questions regarding the hospital course or discharge plan.      Julissa Hdz, MS4  [employer]    Jolene Long APRN, CNP  [facility] Cardiology  [phone]

## 2021-06-24 NOTE — PLAN OF CARE
Problem: Adult Inpatient Plan of Care  Goal: Plan of Care Review  Outcome: Adequate for Discharge  Goal: Patient-Specific Goal (Individualized)  Outcome: Adequate for Discharge  Goal: Absence of Hospital-Acquired Illness or Injury  Outcome: Adequate for Discharge  Goal: Optimal Comfort and Wellbeing  Outcome: Adequate for Discharge  Goal: Readiness for Transition of Care  Outcome: Adequate for Discharge

## 2021-06-24 NOTE — PLAN OF CARE
Temp: 97.7  F (36.5  C) Temp src: Oral BP: (!) 137/95 Pulse: 74   Resp: 16 SpO2: 97 % O2 Device: None (Room air)       D: Admitted from home with SOB and PIERCE. Hx HFrEF (EF 35-40%) d/t ICM, CKD s/p LDKT (2004), anal cancer s/p chemo in 2018, AAA s/p EVAR c/b R femoral groin abscess and sepsis, CAD with STEMIs (4/7/21 and 4/23/21)    I/A: Maribel is A&O x4. Tele in place, SR. VSS on RA. PIV in place, SL. Bilateral groin site dressings CDI. Maribel was intermittently SOB and tachypnic this shift, spO2 WDL and lung sounds clear. Up independently. Slept well overnight    P: Continue to monitor and follow POC. Possible discharge 6/24. Notify Cards 1 with changes

## 2021-06-24 NOTE — DISCHARGE INSTRUCTIONS
________________________________________________________  Discharge RN please fax discharge orders to home care agency: [facility]    ________________________________________________________          Take your medicines every day, as directed    Hi Maribel, it was very nice meeting you at the bedside before you discharged home today. We are looking forward to meeting you in the CORE/Heart Failure clinic.  Until then, our phone numbers are below.  Please call us with questions.    Take care,  [facility] - Heart Failure Nurse   Monitor Your Weight and Symptoms    Contact us if you:      Gain 2 pounds in one day or 5 pounds in one week    Feel more short of breath    Notice more leg swelling    Feel lightheadeded   Change your lifestyle    Limit Salt or Sodium:    2000 mg  Limit Fluids:    2000 mL or approximately 64 ounces  Eat a Heart Healthy Diet    Low in saturated fats  Stay Active:    Aim to move at least 150 minutes every  week         To Contact us    During Business Hours:  [phone], option # 1 (University)  Then option # 4 (medical questions)     After hours, weekends or holidays:   [phone], Option #4  Ask to speak to the On-Call Cardiologist. Inform them you are a CORE/heart failure patient at the [facility].     Use [employer] allows you to communicate directly with your heart team through secure messaging.    [facility] can be accessed any time on your phone, computer, or tablet.    If you need assistance, we'd be happy to help!         Keep your Heart Appointments:    New CORE/Heart Failure appt with Marguerite Muñoz NP  7/2/21 at 11:30, labs at 11am.    [facility]  [address] [place] [id number]

## 2021-06-25 ENCOUNTER — TELEPHONE (OUTPATIENT)
Dept: FAMILY MEDICINE | Facility: CLINIC | Age: 71
End: 2021-06-25

## 2021-06-25 ENCOUNTER — VIRTUAL VISIT (OUTPATIENT)
Dept: FAMILY MEDICINE | Facility: CLINIC | Age: 71
End: 2021-06-25
Payer: COMMERCIAL

## 2021-06-25 ENCOUNTER — PATIENT OUTREACH (OUTPATIENT)
Dept: CARE COORDINATION | Facility: CLINIC | Age: 71
End: 2021-06-25

## 2021-06-25 ENCOUNTER — PATIENT OUTREACH (OUTPATIENT)
Dept: CARDIOLOGY | Facility: CLINIC | Age: 71
End: 2021-06-25

## 2021-06-25 DIAGNOSIS — I25.111 CORONARY ARTERY DISEASE INVOLVING NATIVE CORONARY ARTERY OF NATIVE HEART WITH ANGINA PECTORIS WITH DOCUMENTED SPASM (H): ICD-10-CM

## 2021-06-25 DIAGNOSIS — Z71.89 OTHER SPECIFIED COUNSELING: ICD-10-CM

## 2021-06-25 DIAGNOSIS — I71.40 ABDOMINAL AORTIC ANEURYSM (AAA) WITHOUT RUPTURE (H): Primary | ICD-10-CM

## 2021-06-25 DIAGNOSIS — D84.9 IMMUNOSUPPRESSION (H): ICD-10-CM

## 2021-06-25 DIAGNOSIS — E46 PROTEIN-CALORIE MALNUTRITION, UNSPECIFIED SEVERITY (H): ICD-10-CM

## 2021-06-25 DIAGNOSIS — I50.9 CONGESTIVE HEART FAILURE, UNSPECIFIED HF CHRONICITY, UNSPECIFIED HEART FAILURE TYPE (H): ICD-10-CM

## 2021-06-25 DIAGNOSIS — Z48.298 AFTERCARE FOLLOWING ORGAN TRANSPLANT: ICD-10-CM

## 2021-06-25 PROCEDURE — 99495 TRANSJ CARE MGMT MOD F2F 14D: CPT | Performed by: FAMILY MEDICINE

## 2021-06-25 RX ORDER — LACTOSE-REDUCED FOOD
1 LIQUID (ML) ORAL DAILY
Qty: 237 ML | Refills: 4 | Status: SHIPPED | OUTPATIENT
Start: 2021-06-25 | End: 2021-06-25

## 2021-06-25 RX ORDER — LACTOSE-REDUCED FOOD
1 LIQUID (ML) ORAL DAILY
Qty: 474 ML | Refills: 11 | Status: SHIPPED | OUTPATIENT
Start: 2021-06-25 | End: 2021-07-14

## 2021-06-25 RX ORDER — LACTOSE-REDUCED FOOD
1 LIQUID (ML) ORAL DAILY
Qty: 198 ML | Refills: 5 | Status: SHIPPED | OUTPATIENT
Start: 2021-06-25 | End: 2021-06-25

## 2021-06-25 RX ORDER — LACTOSE-REDUCED FOOD
1 LIQUID (ML) ORAL DAILY
Qty: 396 ML | Refills: 11 | Status: SHIPPED | OUTPATIENT
Start: 2021-06-25 | End: 2023-01-01 | Stop reason: SINTOL

## 2021-06-25 NOTE — TELEPHONE ENCOUNTER
Called Maribel to discuss transition home from hospital.  She reports she is doing pretty good. Reports she has been up walking around her room and has gone up/down th stairs. She reports her grandson is buying her a scale today and she will begin daily weights once it is received. Reports breathing is good. She reports she does her medications on her own and is all set up with her new/ changed medications. Reports she has all of her medications and no questions on these.   Confirmed follow up appointment for 7/2 .  No additional questions or concerns.

## 2021-06-25 NOTE — TELEPHONE ENCOUNTER
Reason for Call:  Form, our goal is to have forms completed with 72 hours, however, some forms may require a visit or additional information.    Type of letter, form or note:  plan of care/cert    Who is the form from?: [facility] (if other please explain)    Where did the form come from: form was faxed in    What clinic location was the form placed at?: [facility]    Where the form was placed: teresa Box/Folder    What number is listed as a contact on the form?:  Fax [phone]       Additional comments:     Call taken on 6/25/2021 at 8:53 AM by Hitesh Mcgill

## 2021-06-25 NOTE — PROGRESS NOTES
Clinic Care Coordination Contact    Situation: Patient chart reviewed by care coordinator.    Background: Patient with RISK score of 31% and recent hospital admission at [facility] from 6/22-6/24/21 for diagnosis of unspecified CHF.     Assessment: Patient had recent outreach by clinic care coordination team ([facility] CHW) last week and declined need for Care Coordination support.  Patient has PCP follow up scheduled for this afternoon, 6/25 as well as CORE clinic scheduled within 1 week of discharge.  Lastly, patient has ongoing home care support and services through [facility] (RN visits)    Plan/Recommendations: Based on reasons above, will not perform outreach to patient following this most recent hospitalization.  Care Coordination remains available if future needs arise.     CHERRI SrivastavaN, RN   [facility]  - Clinic Care Coordinator

## 2021-06-25 NOTE — PROGRESS NOTES
Maribel is a 70 year old who is being evaluated via a billable video visit.      How would you like to obtain your AVS? MyChart  If the video visit is dropped, the invitation should be resent by: Text to cell phone: [phone]  Will anyone else be joining your video visit? No    Video Start Time: 2:20 PM    Assessment & Plan     Abdominal aortic aneurysm (AAA) without rupture (H)  underwent endovascular pair of abdominal aortic aneurysm 4/6/21 with an aorto uniiliac device to the left iliac system and a femoral-femoral crossover graft.  Procedure was complicated by postop MI requiring emergency cardiac catheterization and LAD stenting.  Was placed on Brilinta afterwards  Also complicated by groin wound infection with sepsis secondary to right femoral groin access site and perigraft abscess.  She underwent a groin washout, sartorius muscle flap and redo fem-fem bypass with cadaveric artery on 04/25/2021  - Nutritional Supplements (ENSURE CLEAR) LIQD; Take 1 Bottle by mouth daily  - Nutritional Supplements (ENSURE ORIGINAL) LIQD; Take 1 Bottle by mouth daily    Coronary artery disease  S/p RCA stent on 4/7/21 with extensive coronary vasospasm      Aftercare following organ transplant  S/p kidney transplant - on immunosupressive medications   Creatinine with slight bump    Congestive heart failure, unspecified HF chronicity, unspecified heart failure type (H)  Pt recently hospitalized with 10 pound weight gain due to Acute heart failure  She has appt with CORE clinic early next month and labs   She was strongly encouraged to get scale today to weigh herself at home daily and report and 2+ daily weight gains or 5+ in a week  - Nutritional Supplements (ENSURE CLEAR) LIQD; Take 1 Bottle by mouth daily  - Nutritional Supplements (ENSURE ORIGINAL) LIQD; Take 1 Bottle by mouth daily    Immunosuppression (H)     - Nutritional Supplements (ENSURE CLEAR) LIQD; Take 1 Bottle by mouth daily  - Nutritional Supplements (ENSURE  ORIGINAL) LIQD; Take 1 Bottle by mouth daily    Protein-calorie malnutrition, unspecified severity (H)     - Nutritional Supplements (ENSURE CLEAR) LIQD; Take 1 Bottle by mouth daily  - Nutritional Supplements (ENSURE ORIGINAL) LIQD; Take 1 Bottle by mouth daily      60 minutes spent on the date of the encounter doing chart review, review of test results, patient visit and documentation             No follow-ups on file.    Lisa Martinez DO  [facility] UPTOLENO López is a 70 year old who presents for the following health issues     HPI       Hospital Follow-up Visit:    Hospital/Nursing Home/IP Rehab Facility: [facility]  Date of Admission: 6/22/2021  Date of Discharge: 6/24/2021  Reason(s) for Admission: CHF    pt has actually had 4 hospital and ER visits since early April   See below but all records reviewed   Was your hospitalization related to COVID-19? No   Problems taking medications regularly:  None  Medication changes since discharge: None  Problems adhering to non-medication therapy:  None    Summary of hospitalization:  [facility] discharge summary reviewed  Diagnostic Tests/Treatments reviewed.  Follow up needed: daily weights, labs and f/u CORE clinic  Other Healthcare Providers Involved in Patient s Care:         Homecare and Specialist appointment - early July  Update since discharge: stable. Post Discharge Medication Reconciliation: discharge medications reconciled, continue medications without change.  Plan of care communicated with patient     April 6th first surgery     April 23rd readmitted  4/24/2021: Groin exploration and evacuation of abscess, irrigation, debridement, and docking.  4/25/2021: Axplantation infected graft, femoral to femoral bypass with cadaveric artery, bilateral SATORIUS MUSCLE FLAP CREATION, evacuation of abscess, vacuum closure  4/25/2021: Coronary angiogram  4/26/2021: Wound exploration with  debridement and irrigation and Veraflow dressing placement     Was admitted to [facility] on 5/2/21 and stayed until 5/23/21    On 6/13/21 went to ER - sent home   Was infection and dyspnea    Went back on 6/22/21     Son passed away during all this time    Hospital Course by Diagnosis:  # Respiratory distress 2/2 acute on chronic systolic heart failure  # HFrEF (EF 30-35%) 2/2 ischemic CM, NYHA class III  # CAD with STEMI s/p LIUDMILA to RCA (4/7/21)   # History of coronary vasospasm   # HTN   Patient presented to ED with 1 week of worsening dyspnea at exertion and at rest.  Noted intermittent exertional dyspnea since hospitalization in April-May of this year, dyspnea had initially improved in May but has now worsened. At presentation, BNP elevated at 8K. Troponin negative x1, EKG with no significant findings. Last ROLY (4/23/21) revealed moderately reduced LV function with EF 35-40%, akinesis mid septum, inferior, apical wall. HFrEF likely secondary to ischemic cardiomyopathy given patient's history of two separate STEMIs in April of this year. CXR revealed emphysematous changes of lungs, no evidence of pulmonary edema. CT chest PE w contrast revealed emphysema with probable RUL scarring and radiation related change, severe CAD, 5.1 cm descending thoracic aortic aneurysm not significantly changed, and no PE.    Patient weighed 91.7 lbs on admission, 81.7 lbs on discharge.      - Diuretic: Torsemide 10 mg oral daily (in TRANSFORM)  - BB: Carvedilol 3.125 mg oral BID   - ACEi: Increased PTA Lisinopril to 10 mg daily  - Aldosterone antagonist: Start Aldactone as OP if BP, Cr allows  - ARNI: none, not affordable   - SGLT2i: none, not affordable   - Patient to follow up with CORE for further management of HFrEF     Weight -  Told to buy a scale and weight herself - grandson is going to  today   Has instructions -   2 pounds in a day or 5 pounds in a week    Has appointment for lab work on July 2nd and  has appointment with CORE cardiology clinic  Has some meds left from being in the hospital      # AMY on CKD s/p LDKT in 2004   Cr 1.31 -> 1.35 -> 1.38. GFR 23 on discharge.   - Continue PTA mycophenolate 360 mg BID  - Continue PTA prednisone 5 mg daily  - Continue PTA Caltrate BID       # AAA s/p EVAR on 4/26/21  # PAD s/p L-R femoral bypass graft c/b MSSA bacteremia due to perigraft abscess   Upon arrival to ED, patient was afebrile, no leukocytosis, CRP in 14-15 range. Given patient's age, upper limit of normal for CRP is 19.6 mg/L. PAD symptoms improved and denied abdominal or back pain. Descending thoracic aortic aneurysm stable on CTA.        # Emphysema  # SCC s/p XRT   # Hx of tobacco use disorder   Mild obstruction with preserved FEV1 and normal lung volumes on PFTs in 2014. Denies active tobacco use. During hospitalization, CXR and CT chest PE revealed emphysema.  - Consider repeat PFTs as outpatient.     # Elevated alkaline phosphatase - resolved   Alk phos 175 on admission. Denies abd pain, LFTs otherwise wnl. Alk phos 150, resolved after one day.      # Malnutrition   # MGUS   Patient appears cachetic, c/f malnutrition 2/2 recent infections, acute illness and hospitalizations. SPEP on 3/1/21 revealed very small monoclonal gammopathy.   - Nutrition was consulted; pt on 3gm Na diet, daily multivitamin with minerals  - Pt previously enrolled in meal service offering low-sodium meals once meal service through [employer] (in ~2 weeks after discharge per pt). Patient was given information and signed up for [facility] [employer] meal service.      # Biliateral groin wounds 2/2 vascular surgery  Patient was admitted to the hospital on 4/23/2021 for infection of her fem-fem bypass which was subsequently explanted and replaced with a homograft and closed with bilateral sartorius muscle flaps and placement of wound vacs to bilateral groins. Patient was discharged to TCU and her wound vacs were removed at that time. Wound was  consulted for management during hospital stay. Blood cultures with NGTD  - No further monitoring needed at this time    Taking cultures once a week for infection - if negative for 4 weeks then lower risk  Has home nurse coming out 3 times a week -      Had appointment few days prior     Nutrition - was good at transitional care unit         Consults:  Nutrition  Wound       Medication Changes:  - START Torsemide 10 mg oral daily  - START Carvedilol 3.125 mg oral BID   - MODIFY Lisinopril 10 mg daily [PTA lisinopril 5 mg daily]      Review of Systems   Constitutional, HEENT, cardiovascular, pulmonary, GI, , musculoskeletal, neuro, skin, endocrine and psych systems are negative, except as otherwise noted.      Objective           Vitals:  No vitals were obtained today due to virtual visit.    Physical Exam   GENERAL: Healthy, alert and no distress  RESP: No audible wheeze, cough, or visible cyanosis.  No visible retractions or increased work of breathing.    SKIN: Visible skin clear. No significant rash, abnormal pigmentation or lesions.  NEURO: Cranial nerves grossly intact.  Mentation and speech appropriate for age.  PSYCH: Mentation appears normal, affect normal/bright, judgement and insight intact, normal speech and appearance well-groomed.                Video-Visit Details    Type of service:  Video Visit    Video End Time:2:50pm    Originating Location (pt. Location): Home    Distant Location (provider location):  [facility]     Platform used for Video Visit: Mo-[employer]

## 2021-06-26 ENCOUNTER — DOCUMENTATION ONLY (OUTPATIENT)
Dept: CARE COORDINATION | Facility: CLINIC | Age: 71
End: 2021-06-26
Payer: COMMERCIAL

## 2021-06-26 NOTE — PROGRESS NOTES
[facility] now requests orders and shares plan of care/discharge summaries for some patients through [employer].  Please REPLY TO THIS MESSAGE OR ROUTE BACK TO THE AUTHOR in order to give authorization for orders when needed.  This is considered a verbal order, you will still receive a faxed copy of orders for signature.  Thank you for your assistance in improving collaboration for our patients.    ORDER  Hi Dr Ferrara,     I did a home visit with patient today and would like to resume wound care orders.     Can I get a verbal ok to the wound order:  Cleanse incisions to RLQ and LLQ with wound cleanser or soap/water, pat dry and apply antibiotic ointments, cover with gauze. Change every 1 to 2 days or as needed. Caregiver to preform wound care on non-nursing days. Open to air when healed.     Also, the patient mentioned that she was told she will need some blood culture done since she's done with antibiotics. I'm not sure if this is being done at her next appointment with you or home care will need to draw labs. If you are needing home care to draw labs, please let us know what labs need to be done. I can take a verbal. Thank you for your time.

## 2021-06-26 NOTE — PROGRESS NOTES
[facility] now requests orders and shares plan of care/discharge summaries for some patients through [employer].  Please REPLY TO THIS MESSAGE OR ROUTE BACK TO THE AUTHOR in order to give authorization for orders when needed.  This is considered a verbal order, you will still receive a faxed copy of orders for signature.  Thank you for your assistance in improving collaboration for our patients.    ORDER    Angelito Martinez,     I did a home visit with patient and would like to resume homecare.     Can we resume orders skilled nursing visits?  I need SN visits 2 times a week for 7weeks, 3 prn visits as needed for wound care, medication education and vital assessment, and lab as needed.     Also, patient stated that she was sent home with a bottle of Potassium but it was not on her discharge med list. She has not taken it yet. Please confirm if she should be taking it or not.   Thank you for your time.

## 2021-06-28 ENCOUNTER — TELEPHONE (OUTPATIENT)
Dept: FAMILY MEDICINE | Facility: CLINIC | Age: 71
End: 2021-06-28

## 2021-06-28 LAB
BACTERIA SPEC CULT: NO GROWTH
SPECIMEN SOURCE: NORMAL

## 2021-06-28 NOTE — TELEPHONE ENCOUNTER
PN,   Radha just called back   States when pt was discharged from the hospital she was given a bottle of potassium   She doesn't know the strength - states pt didn't show her the bottle  Not on discharge list though and pt doesn't know what to take or if she should be taking this   Last potassium level 6/24/2021 was normal at 3.7  Please advise  Thanks,  AVERY Burnette can be reached at [phone]

## 2021-06-28 NOTE — TELEPHONE ENCOUNTER
PN,   FYI:  Radha calling from [facility] for orders  Skilled nursing 2x/wk for 7 weeks and 3 PRN visits  They will be doing wound care and assessment   Pt does not want PT or OT  Gave ok for these orders  Dannielle DSOUZA RN

## 2021-06-29 DIAGNOSIS — I50.22 CHRONIC SYSTOLIC HEART FAILURE (H): Primary | ICD-10-CM

## 2021-06-29 LAB
BACTERIA SPEC CULT: NO GROWTH
SPECIMEN SOURCE: NORMAL

## 2021-06-29 ASSESSMENT — MINNESOTA LIVING WITH HEART FAILURE QUESTIONNAIRE (MLHF)
EATING LESS FOODS YOU LIKE: 2
LOSS OF SELF CONTROL IN YOUR LIFE: 5
DIFFICULTY WORKING TO EARN A LIVING: 0
MAKING YOU FEEL DEPRESSED: 2
WALKING ABOUT OR CLIMBING STAIRS DIFFICULT: 3
MAKING YOU WORRY: 4
DIFFICULTY WITH SEXUAL ACTIVITIES: 0
MAKING YOU STAY IN A HOSPITAL: 4
COSTING YOU MONEY FOR MEDICAL CARE: 5
DIFFICULTY TO CONCENTRATE OR REMEMBERING THINGS: 2
DIFFICULTY WITH RECREATIONAL PASTIMES, SPORTS, HOBBIES: 5
FEELING LIKE A BURDEN TO FAMILY AND FRIENDS: 2
DIFFICULTY SLEEPING WELL AT NIGHT: 3
TIRED, FATIGUED OR LOW ON ENERGY: 4
SWELLING IN ANKLES OR LEGS: 1
MAKING YOU SHORT OF BREATH: 4
HAVING TO SIT OR LIE DOWN DURING THE DAY: 4
DIFFICULTY SOCIALIZING WITH FAMILY OR FRIENDS: 3
TOTAL_SCORE: 64
WORKING AROUND THE HOUSE OR YARD DIFFICULT: 5
DIFFICULTY GOING AWAY FROM HOME: 5
GIVING YOU SIDE EFFECTS FROM TREATMENTS: 1

## 2021-06-29 NOTE — TELEPHONE ENCOUNTER
I do not see any low potassium levels her labs.    No - do not give potassium for now but have her set aside in case her diuretic causes her potassium to go low.    Thanks  PN

## 2021-06-29 NOTE — TELEPHONE ENCOUNTER
Spoke with Radha from [facility] home care, detailed message given, she verbalized understanding and will contact the pt    Gayathri Corrigan RN   [facility]

## 2021-06-30 ENCOUNTER — TELEPHONE (OUTPATIENT)
Dept: FAMILY MEDICINE | Facility: CLINIC | Age: 71
End: 2021-06-30

## 2021-06-30 NOTE — TELEPHONE ENCOUNTER
Form Faxed and sent for scanning  Donna [facility] Unit Coordinator

## 2021-06-30 NOTE — TELEPHONE ENCOUNTER
Reason for Call:  Form, our goal is to have forms completed with 72 hours, however, some forms may require a visit or additional information.    Type of letter, form or note:   Medication order 6/28/2021    Who is the form from?: [facility] (if other please explain)    Where did the form come from: form was faxed in    What clinic location was the form placed at?: [facility]    Where the form was placed: teresa Box/Folder    What number is listed as a contact on the form?:  Fax [phone]       Additional comments:     Call taken on 6/30/2021 at 10:09 AM by Hitesh Mcgill

## 2021-07-01 ENCOUNTER — VIRTUAL VISIT (OUTPATIENT)
Dept: PHARMACY | Facility: CLINIC | Age: 71
End: 2021-07-01
Payer: COMMERCIAL

## 2021-07-01 DIAGNOSIS — I15.1 HTN, KIDNEY TRANSPLANT RELATED: ICD-10-CM

## 2021-07-01 DIAGNOSIS — K59.00 CONSTIPATION: ICD-10-CM

## 2021-07-01 DIAGNOSIS — I82.4Y9 ACUTE DEEP VEIN THROMBOSIS (DVT) OF PROXIMAL VEIN OF LOWER EXTREMITY, UNSPECIFIED LATERALITY (H): ICD-10-CM

## 2021-07-01 DIAGNOSIS — J43.9 EMPHYSEMA, UNSPECIFIED (H): ICD-10-CM

## 2021-07-01 DIAGNOSIS — J30.1 SEASONAL ALLERGIC RHINITIS DUE TO POLLEN: ICD-10-CM

## 2021-07-01 DIAGNOSIS — Z94.0 HTN, KIDNEY TRANSPLANT RELATED: ICD-10-CM

## 2021-07-01 DIAGNOSIS — E46 MALNUTRITION (H): ICD-10-CM

## 2021-07-01 DIAGNOSIS — I50.9 CONGESTIVE HEART FAILURE, UNSPECIFIED HF CHRONICITY, UNSPECIFIED HEART FAILURE TYPE (H): Primary | ICD-10-CM

## 2021-07-01 DIAGNOSIS — J43.9 PULMONARY EMPHYSEMA, UNSPECIFIED EMPHYSEMA TYPE (H): ICD-10-CM

## 2021-07-01 DIAGNOSIS — N18.30 STAGE 3 CHRONIC KIDNEY DISEASE, UNSPECIFIED WHETHER STAGE 3A OR 3B CKD (H): ICD-10-CM

## 2021-07-01 DIAGNOSIS — E78.5 HYPERLIPIDEMIA LDL GOAL <70: ICD-10-CM

## 2021-07-01 DIAGNOSIS — K59.00 CONSTIPATION, UNSPECIFIED CONSTIPATION TYPE: ICD-10-CM

## 2021-07-01 DIAGNOSIS — Z71.85 VACCINE COUNSELING: ICD-10-CM

## 2021-07-01 DIAGNOSIS — E46 PROTEIN-CALORIE MALNUTRITION, UNSPECIFIED SEVERITY (H): ICD-10-CM

## 2021-07-01 DIAGNOSIS — Z94.0 KIDNEY REPLACED BY TRANSPLANT: ICD-10-CM

## 2021-07-01 DIAGNOSIS — I25.111 CORONARY ARTERY DISEASE INVOLVING NATIVE CORONARY ARTERY OF NATIVE HEART WITH ANGINA PECTORIS WITH DOCUMENTED SPASM (H): ICD-10-CM

## 2021-07-01 PROCEDURE — 99605 MTMS BY PHARM NP 15 MIN: CPT | Performed by: PHARMACIST

## 2021-07-01 PROCEDURE — 99607 MTMS BY PHARM ADDL 15 MIN: CPT | Performed by: PHARMACIST

## 2021-07-01 NOTE — PROGRESS NOTES
Medication Therapy Management (MTM) Encounter    ASSESSMENT:                            Medication Adherence/Access: No issues identified    HFrEF/Hypertension: The patient's blood pressure was low per her report when checked by home care - has not had a recheck in clinic since discharge. She is struggling with SE of recent medication additions, which sound like they are due more to the addition of carvedilol than the increase in lisinopril or addition of torsemide.   The patient has tolerated metoprolol in the past and may benefit from switching back to this medication (may also be less expensive for her).   Additionally, she may be able to decrease/discontinue isosorbide at this time to see if there is more room to titrate her to target doses of her HF medications.   Her daily weights are stable per her report and she is avoiding alcohol and eating a low sodium diet    CAD/Hyperlipidemia: Lipids at goal. Needs follow-up on duration of DAPT, and if DOAC should be restarted. Additionally, ticagrelor is expensive, could consider switching back to Clopidogrel.     Hx of DVT: Needs evaluation by cardiology/hematology as noted above.     Kidney TXP/CKD: Stable    Malnutrition: Patient is unable to afford Ensure twice daily so should continue to use once daily. The patient may benefit from a care coordinator in the future to help ensure that she is able to get healthy meals delivered to her home. The patient was recommended to call [employer] for food services and would benefit from following up with them, but will likely need assistance doing so.      Allergies: Stable    Emphysema: Needs follow-up by provider.     Constipation: Benefiber is a good first step, sounds like she is taking in adequate fluids. If symptoms continue to persist despite fiber supplement use, miralax may be beneficial.     Immunizations: due for booster of PPSV23 (not received a dose after turing 67 years old) and Shringrix vaccines.     PLAN:                             1. Continue to use your fiber supplement and take it with a full glass of water. If you are still constipated after a week of taking the fiber supplement start using Miralax daily to help with your constipation.     2. Jasmyn will send over the suggestions to your cardiologist we talked about today. She will suggest switching back to metoprolol (12.5mg daily) from carvedilol and decreasing your isosorbide mononitrate. Jasmyn will also suggest switching from Brilinta to Clopidogrel as it costs less.     3. Jasmyn will talk to Dr. Martinez about your clot preventing medications (Eliquis).     4. Continue to drink Ensure and follow up on [facility] for food delivery. It is important that you continue to eat well balanced meals to help keep your body healthy and strong. We will also put in a care coordination referral to see if they can assist you with food/cost issues with ensure.     5. We noticed after we talked that you are due for a booster of the [id number] vaccine and the Shingrix vaccine you can get these vaccines at your pharmacy and the Shingrix will be better covered there instead of in clinic.      6. We noticed in one of your hospital summaries that they wanted Dr. Martinez to follow-up on your lung x-rays. We will make sure Dr. Martinez is aware.     Follow-up: Jasmyn will call you 1-2 weeks to check in and follow up on the suggestions she made today. Please reach out on [facility] or call if you need anything!    SUBJECTIVE/OBJECTIVE:                          Maribel Yang is a 70 year old female called for a transitions of care visit. She was discharged from [facility] on 6/24 for acute on chronic HFrEF (EF 35-40%). However, she's been hospitalized 4 times this year after complications from a AAA repair.       Reason for visit: Comprehensive medication review - her main concern today is fatigue and dizziness that is keeping her from leaving her home.     Allergies/ADRs: Reviewed in  chart  Tobacco: Quit smoking completely about a couple months ago after hospitalizations.   Alcohol: none  Caffeine: 1 cups/day of coffee  Activity: Limited right now as she is feeling pretty poorly which she attributes her her medications (details below).   Past Medical History: Reviewed in chart and of note - STEMI w/LIUDMILA in April, AAA s/p EVAR in April, PAD s/P bypass, COPD, CKD, renal transplant in 2004, infection/sepsis at groin site from AAA repair, cancer  Personal Healthcare Goals: To be able to go on walks outside again.     Medication Adherence/Access: no issues reported currently, however her three new heart medications cost her $74.  She has previously had to stop Eliquis due to cost issues. Brillinta was affordable, but could be expensive going forward. Additionally she cannot afford two bottles of ensure/day, but is managing to drink one per day. Julienne has extended meals to her home, but she really hasn't been hungry.      HFrEF/Hypertension: Current medications include carvedilol 3.125mg twice daily (new at last hospitalization), lisinopril 10mg once daily (increased from 5mg during last hospitalization), torsemide 10mg once daily (new since last hospitalization). Additionally she is on isosorbide 60mg daily (started during her 5/2/21 hospital admission. She was having coronary artery vasospasms and BB were stopped at this visit and isosorbide was started. BB have subsequently been restarted due to her HF exacerbation).   Discharge note indicates that Entresto and GPKM7pi are not affordable for her. Also suggest addition of spironolactone after follow-up with [facility] (which is scheduled for tomorrow).   Patient reports the following medication side effects: reports being tired, dizzy, and not well since being discharged from the hospital and starting her new medications. She reports that she is unable to go for a walk since starting to take these medication due to fatigue and dizziness. She is  tearful about this on the phone today. She states she was not having these sx while inpt, and that they started after getting home (she is on the same meds). Was previously on metoprolol for years and reports no problems with this medication. Patient reports no chest pain at this time and only has had chest pain in the past with her MI. She also denies any SOB, edema or orthopnea.   Patient has blood pressure monitored by home nurse. Home BP monitoring yesterday reported by the patient was 90/60 mmHg.    Patient is not complaining of HF symptoms.    Patient is measuring daily- looking for a change of 2lbs in a day or 5lbs in a week. Weights and reports stable and no change noticed.   [place] [facility] [place], has home care that is coming out now.     CAD/Hyperlipidemia: Current therapy includes atorvastatin 80mg daily, Brilinta 90mg twice daily, aspirin 81mg daily, and medications as noted above.  Patient reports no significant myalgias or other side effects.   History: was on Pletal and Clopidogrel for PAD until 3/16/20 when she was diagnosed with unprovoked DVT (her second one, her daughter has also had PE).  Hematology stopped clopidogrel at their visit on 3/16 and started Eliquis. She took Eliquis for 2 months or so until she was no longer able to afford this and switched to Lovenox (she preferred this to warfarin).  Around 8/16/20 they were able to get her signed up for a patient assistance program and she was able to switch back to Eliquis.  On 4/6/21 she had a STEMI after AAA repair and was started on DAPT with Brilinta. At that time she had some issues with rectal bleeding and vascular medicine stopped Eliquis. Her discharge summary on 5/23 indicates that her Eliquis is still on hold per vascular surgeon and need to restart/continue Brilinta should be re-evaluated by cardiology and hematology.   Currently taking pantoprazole 40mg daily. It appears this was started on 5/3 at discharge to  "decrease GI bleed risk in the setting of DAPT. Reports no heartburn symptoms at this time.    She has acetaminophen on her medication list, however her pain is resolved and she is not currently using this.    Hx of DVT: As noted above, previously taking Eliquis, stopped during DAPT.     Kidney TXP/CKD: Currently taking mycophenolate 360mg twice daily, predinsone 5mg daily and calcium calcium carbonate 600 mg- vitamin D 400 units twice daily.     Malnutrition: Is using Ensure once daily but is unable to afford consuming Ensure twice daily. She also reports that she has a delivery service that will bring meals to her home. She was recommended to use [facility] as an additional resource. The patient reports that they have not been hungry for the last couple of days.      Allergies: Currently using Flonase 50mcg one spray into each nostril daily. Reports that she keeps the windows closed more and that her allergies have been \"pretty good.\" She reports no symptoms at this time.     Emphysema: Noted on scans and PFTs while inpt.  She does not have any inhalers on her medication list. No follow-up scheduled.     Constipation: taking calcium carbonate 600 mg- vitamin D 400 units twice daily for years. Patient reports recent constipation even after discontinuing discharge pain medications. The patient reports she has started taking benefiber a few days ago with no relief. The patient reports having difficulty with trying to have a stool and her stools are small, hard and pebble like. She reports she is having some gas and bloating as well. She is not fluid restricted, and is drinking about 3-16 oz bottles of water per day as well as a daily cup of coffee. Also drinking juice and milk daily. She has miralax at home and has tried it a few times, but then stopped when it started working. She has also tried milk of magensia, but that caused frequent stooling and was too much for her.     Immunizations: Has received the COVID-19 " vaccine and is up to date on Tdap. The patient is due for a PPSV23 booster as they have not received a dose after turning 67 years old. Also due for the Shingrix vaccine.   ----------------  Post Discharge Medication Reconciliation Status: discharge medications reconciled and changed, per note/orders.    I spent 34 minutes with this patient today. All changes were made via collaborative practice agreement with Dr. Martinez. A copy of the visit note was provided to the patient's primary care provider.    The patient was sent via [email or web address] a summary of these recommendations.     Caden Bernal, PharmD Student  Jasmyn Starks, Pharm.D., M.B.A., BCACP  MTM Pharmacist, [facility]    Telemedicine Visit Details  Type of service:  Telephone visit  Start Time: 11:06 AM  End Time: 11:40 AM  Originating Location (patient location): Home  Distant Location (provider location):  [facility]        Medication Therapy Recommendations  Abdominal aortic aneurysm (AAA) without rupture (H)    Current Medication: isosorbide mononitrate (IMDUR) 60 MG 24 hr tablet   Rationale: Dose too high - Dosage too high - Safety   Recommendation: Decrease Dose   Status: Contact Provider - Awaiting Response          Current Medication: ticagrelor (BRILINTA) 90 MG tablet   Rationale: Cannot afford medication product - Cost - Adherence   Recommendation: Change Medication - CLOPIDOGREL BISULFATE PO - Patient can not continue to afford Brilinta and has tolerated clopidogrel in the past.   Status: Contact Provider - Awaiting Response         Congestive heart failure, unspecified HF chronicity, unspecified heart failure type (H)    Current Medication: carvedilol (COREG) 3.125 MG tablet   Rationale: Undesirable effect - Adverse medication event - Safety   Recommendation: Change Medication - METOPROLOL SUCCINATE ER PO - 12.5mg once daily   Status: Contact Provider - Awaiting Response         Constipation    Rationale: Untreated  condition - Needs additional medication therapy - Indication   Recommendation: Start Medication - MiraLax 17 g Pack - Start miralax if fiber supplement does not help with constipation   Status: Patient Agreed - Adherence/Education         Vaccine counseling    Rationale: Preventive therapy - Needs additional medication therapy - Indication   Recommendation: Provide Education - Pneumovax 23 25 MCG/0.5ML Inj - Patient is due for [id number] and Shingrix vaccines   Status: Patient Agreed - Adherence/Education

## 2021-07-01 NOTE — Clinical Note
Angelito Everett -     I spoke with Maribel today - she is seeing you on Friday AM. She was quite tearful during the visit because she's feeling pretty poorly (dizziness and fatigue).  The most recent addition was her carvedilol and she feels like she felt better on metoprolol.  Additionally her meds were expensive, this switch as well as a switch to clopidogrel may be helpful for her. There are a few other loose ends in my note (need for a DOAC, follow-up on emphysema) that I will address with her PCP, but please let me know if I can help in anyway!   Jasmyn Starks, KalebD, JUAN F, BCACP  MTM Pharmacist, [facility]

## 2021-07-01 NOTE — LETTER
"        Date: 2021    Maribel Yang  [address]  [facility] [address]    Dear Ms. Yang,    Thank you for talking with me on 2021 about your health and medications. Medicare s MTM (Medication Therapy Management) program helps you understand your medications and use them safely.      This letter includes an action plan (Medication Action Plan) and medication list (Personal Medication List). The action plan has steps you should take to help you get the best results from your medications. The medication list will help you keep track of your medications and how to use them the right way.       Have your action plan and medication list with you when you talk with your doctors, pharmacists, and other healthcare providers in your care team.     Ask your doctors, pharmacists, and other healthcare providers to update the action plan and medication list at every visit.     Take your medication list with you if you go to the hospital or emergency room.     Give a copy of the action plan and medication list to your family or caregivers.     If you want to talk about this letter or any of the papers with it, please call   [phone].We look forward to working with you, your doctors, and other healthcare providers to help you stay healthy through the [facility] MTM program.    Sincerely,  Jasmyn Starks [facility]    Enclosed: Medication Action Plan and Personal Medication List    MEDICATION ACTION PLAN FOR Maribel Yang,  1950     This action plan will help you get the best results from your medications if you:   1. Read \"What we talked about.\"   2. Take the steps listed in the \"What I need to do\" boxes.   3. Fill in \"What I did and when I did it.\"   4. Fill in \"My follow-up plan\" and \"Questions I want to ask.\"     Have this action plan with you when you talk with your doctors, pharmacists, and other healthcare providers in your care team. Share this with your family or caregivers " too.  DATE PREPARED: 2021  What we talked about: You are feeling dizzy and tired                                                  What I need to do: Talk with cardiology about changing carvedilol to metoprolol succinate 12.5mg daily What I did and when I did it:                                              What we talked about: you are feeling dizzy and tired                                                  What I need to do: Talk with cardiology about decreasing isosorbide What I did and when I did it:                                               What we talked about: Brilinta is expensive                                                  What I need to do: Talk with cardiology about switching back to clopidogrel What I did and when I did it:                                               What we talked about: You are struggling with constipation                                                  What I need to do: Continue to take your fiber supplement.  If things do not get better add 1 capful of MiraLAX once a day. What I did and when I did it:                                               What we talked about: You are due for a couple of immunizations. One to help prevent pneumonia and one to help prevent shingles.                                                  What I need to do: Ask at your pharmacy about Pneumovax 23 and Shingrix vaccines. What I did and when I did it:                                               My follow-up plan:                 Questions I want to ask:              If you have any questions about your action plan, call Jasmyn Starks [facility], Phone: [phone] , Monday-Friday 8-4:30pm.           PERSONAL MEDICATION LIST FOR Maribel Yang,  1950     This medication list was made for you after we talked. We also used information from your doctor's chart.      Use blank rows to add new medications. Then fill in the dates you started using them.    Cross out  medications when you no longer use them. Then write the date and why you stopped using them.    Ask your doctors, pharmacists, and other healthcare providers to update this list at every visit. Keep this list up-to-date with:       Prescription medications    Over the counter drugs     Herbals    Vitamins    Minerals      If you go to the hospital or emergency room, take this list with you. Share this with your family or caregivers too.     DATE PREPARED: 7/1/2021  Allergies or side effects: Blood transfusion related (informational only), Ultracet, and Hydrocodone     Medication:  ACETAMINOPHEN 325 MG PO TABS      How I use it:  Take 2 tablets (650 mg) by mouth every 4 hours as needed for other (For optimal non-opioid multimodal pain management to improve pain control.)      Why I use it: Abdominal aortic aneurysm (AAA) without rupture (H); ST elevation myocardial infarction (STEMI), unspecified artery (H); Peripheral artery disease (H)    Prescriber:  Rob Warner MD      Date I started using it:       Date I stopped using it:         Why I stopped using it:            Medication:  ASPIRIN 81 MG PO CHEW      How I use it:  Take 1 tablet (81 mg) by mouth daily      Why I use it: Abdominal aortic aneurysm (AAA) without rupture (H); ST elevation myocardial infarction (STEMI), unspecified artery (H); Peripheral artery disease (H)    Prescriber:  Rob Warner MD      Date I started using it:       Date I stopped using it:         Why I stopped using it:            Medication:  ATORVASTATIN CALCIUM 80 MG PO TABS      How I use it:  Take 1 tablet (80 mg) by mouth daily      Why I use it: Abdominal aortic aneurysm (AAA) without rupture (H); ST elevation myocardial infarction (STEMI), unspecified artery (H); Peripheral artery disease (H)    Prescriber:  Rob Warner MD      Date I started using it:       Date I stopped using it:         Why I stopped using it:            Medication:  CALCIUM  CARBONATE-VITAMIN D 600-400 MG-UNIT PO TABS      How I use it:  Take 1 tablet by mouth 2 times daily      Why I use it: Kidney replaced by transplant    Prescriber:  Rob Warner MD      Date I started using it:       Date I stopped using it:         Why I stopped using it:            Medication:  CARVEDILOL 3.125 MG PO TABS      How I use it:  Take 1 tablet (3.125 mg) by mouth 2 times daily (with meals)      Why I use it: Congestive heart failure, unspecified HF chronicity, unspecified heart failure type (H)    Prescriber:  Meggan Long CNP      Date I started using it:       Date I stopped using it:         Why I stopped using it:            Medication:  ENSURE CLEAR PO LIQD      How I use it:  Take 1 Bottle by mouth daily      Why I use it: Abdominal aortic aneurysm (AAA) without rupture (H); Congestive heart failure, unspecified HF chronicity, unspecified heart failure type (H); Immunosuppression (H); Protein-calorie malnutrition, unspecified severity (H)    Prescriber:  Lisa Martinez DO      Date I started using it:       Date I stopped using it:         Why I stopped using it:               Medication:  FLUTICASONE PROPIONATE 50 MCG/ACT NA SUSP      How I use it:  Spray 1 spray into both nostrils daily      Why I use it: Chronic rhinitis    Prescriber:  Rob Warner MD      Date I started using it:       Date I stopped using it:         Why I stopped using it:            Medication:  ISOSORBIDE MONONITRATE ER 60 MG PO TB24      How I use it:  Take 1 tablet (60 mg) by mouth daily      Why I use it: Coronary artery vasospasm (H); Abdominal aortic aneurysm (AAA) without rupture (H); ST elevation myocardial infarction involving right coronary artery (H); Wound infection after surgery    Prescriber:  Rob Warner MD      Date I started using it:       Date I stopped using it:         Why I stopped using it:            Medication:  LISINOPRIL 10 MG PO TABS      How I use it:  Take 1  tablet (10 mg) by mouth daily      Why I use it: Abdominal aortic aneurysm (AAA) without rupture (H); ST elevation myocardial infarction (STEMI), unspecified artery (H); Peripheral artery disease (H)    Prescriber:  Meggan Long CNP      Date I started using it:       Date I stopped using it:         Why I stopped using it:            Medication:  MYCOPHENOLIC ACID (GENERIC EQUIV) 360 MG PO TBEC      How I use it:  Take 1 tablet (360 mg) by mouth 2 times daily      Why I use it: Kidney replaced by transplant; Kidney transplanted; Aftercare following organ transplant; Immunosuppressive management encounter following kidney transplant    Prescriber:  Rob Warner MD      Date I started using it:       Date I stopped using it:         Why I stopped using it:            Medication:  PANTOPRAZOLE SODIUM 40 MG PO TBEC      How I use it:  Take 1 tablet (40 mg) by mouth every morning (before breakfast)      Why I use it: Wound infection after surgery    Prescriber:  Rob Warner MD      Date I started using it:       Date I stopped using it:         Why I stopped using it:            Medication:  PREDNISONE 5 MG PO TABS      How I use it:  Take 1 tablet (5 mg) by mouth daily      Why I use it: Kidney replaced by transplant    Prescriber:  Rob Warner MD      Date I started using it:       Date I stopped using it:         Why I stopped using it:            Medication:  TICAGRELOR 90 MG PO TABS      How I use it:  Take 1 tablet (90 mg) by mouth every 12 hours      Why I use it: Abdominal aortic aneurysm (AAA) without rupture (H); ST elevation myocardial infarction (STEMI), unspecified artery (H); Peripheral artery disease (H)    Prescriber:  Rob Warner MD      Date I started using it:       Date I stopped using it:         Why I stopped using it:            Medication:  TORSEMIDE 10 MG PO TABS      How I use it:  Take 1 tablet (10 mg) by mouth daily      Why I use it: Congestive heart  failure, unspecified HF chronicity, unspecified heart failure type (H)    Prescriber:  Meggan Long CNP      Date I started using it:       Date I stopped using it:         Why I stopped using it:            Medication:         How I use it:         Why I use it:      Prescriber:         Date I started using it:       Date I stopped using it:         Why I stopped using it:            Medication:         How I use it:         Why I use it:      Prescriber:         Date I started using it:       Date I stopped using it:         Why I stopped using it:            Medication:         How I use it:         Why I use it:      Prescriber:         Date I started using it:       Date I stopped using it:         Why I stopped using it:              Other Information:     If you have any questions about your medication list, call Jasmyn Starks [facility], Phone: [phone] , Monday-Friday 8-4:30pm.    According to the Paperwork Reduction Act of 1995, no persons are required to respond to a collection of information unless it displays a valid OMB control number. The valid OMB number for this information collection is 2756-8940. The time required to complete this information collection is estimated to average 40 minutes per response, including the time to review instructions, searching existing data resources, gather the data needed, and complete and review the information collection. If you have any comments concerning the accuracy of the time estimate(s) or suggestions for improving this form, please write to: CMS, Attn: ZARINA Reports Clearance Officer, [address] [place] [id number].

## 2021-07-01 NOTE — PATIENT INSTRUCTIONS
Recommendations from today's MTM visit:                                                       1. Continue to use your fiber supplement and take it with a full glass of water. If you are still constipated after a week of taking the fiber supplement start using Miralax daily to help with your constipation.     2. Jasmyn will send over the suggestions to your cardiologist we talked about today. She will suggest switching back to metoprolol (12.5mg daily) from carvedilol and decreasing your isosorbide mononitrate. Jasmyn will also suggest switching from Brilinta to Clopidogrel as it costs less.     3. Jasmyn will talk to Dr. Martinez about your clot preventing medications (Eliquis).     4. Continue to drink Ensure and follow up on [employer] for food delivery. It is important that you continue to eat well balanced meals to help keep your body healthy and strong. We will also put in a care coordination referral to see if they can assist you with food/cost issues with ensure.     5. We noticed after we talked that you are due for a booster of the [id number] vaccine and the Shingrix vaccine you can get these vaccines at your pharmacy and the Shingrix will be better covered there instead of in clinic.      6. We noticed in one of your hospital summaries that they wanted Dr. Martinez to follow-up on your lung x-rays. We will make sure Dr. Martinez is aware.     Follow-up: Jasmyn will call you 1-2 weeks to check in and follow up on the suggestions she made today. Please reach out on [employer] or call if you need anything!    It was great to speak with you today.  I value your experience and would be very thankful for your time with providing feedback on our clinic survey. You may receive a survey via email or text message in the next few days.     To schedule another MTM appointment, please call the clinic directly or you may call the MTM scheduling line at [phone] or toll-free at [phone].     My Clinical Pharmacist's  contact information:                                                      Please feel free to contact me with any questions or concerns you have.      Jasmyn Starks, Kaleb.D., M.B.A., BCACP  [facility] Pharmacist, [facility]  Pager: [phone]  Email: [email or web address]

## 2021-07-02 ENCOUNTER — OFFICE VISIT (OUTPATIENT)
Dept: CARDIOLOGY | Facility: CLINIC | Age: 71
End: 2021-07-02
Attending: NURSE PRACTITIONER
Payer: COMMERCIAL

## 2021-07-02 ENCOUNTER — PATIENT OUTREACH (OUTPATIENT)
Dept: CARE COORDINATION | Facility: CLINIC | Age: 71
End: 2021-07-02

## 2021-07-02 VITALS
BODY MASS INDEX: 15.58 KG/M2 | SYSTOLIC BLOOD PRESSURE: 137 MMHG | HEART RATE: 76 BPM | DIASTOLIC BLOOD PRESSURE: 86 MMHG | WEIGHT: 85.2 LBS | OXYGEN SATURATION: 96 %

## 2021-07-02 DIAGNOSIS — B95.61 BACTEREMIA DUE TO METHICILLIN SUSCEPTIBLE STAPHYLOCOCCUS AUREUS (MSSA): ICD-10-CM

## 2021-07-02 DIAGNOSIS — L02.214 ABSCESS OF GROIN: ICD-10-CM

## 2021-07-02 DIAGNOSIS — R78.81 BACTEREMIA DUE TO METHICILLIN SUSCEPTIBLE STAPHYLOCOCCUS AUREUS (MSSA): ICD-10-CM

## 2021-07-02 DIAGNOSIS — I50.9 CONGESTIVE HEART FAILURE, UNSPECIFIED HF CHRONICITY, UNSPECIFIED HEART FAILURE TYPE (H): Primary | ICD-10-CM

## 2021-07-02 DIAGNOSIS — I50.22 CHRONIC SYSTOLIC HEART FAILURE (H): ICD-10-CM

## 2021-07-02 LAB
ANION GAP SERPL CALCULATED.3IONS-SCNC: 9 MMOL/L (ref 3–14)
BUN SERPL-MCNC: 34 MG/DL (ref 7–30)
CALCIUM SERPL-MCNC: 9.7 MG/DL (ref 8.5–10.1)
CHLORIDE SERPL-SCNC: 99 MMOL/L (ref 94–109)
CO2 SERPL-SCNC: 28 MMOL/L (ref 20–32)
CREAT SERPL-MCNC: 1.48 MG/DL (ref 0.52–1.04)
GFR SERPL CREATININE-BSD FRML MDRD: 36 ML/MIN/{1.73_M2}
GLUCOSE SERPL-MCNC: 65 MG/DL (ref 70–99)
NT-PROBNP SERPL-MCNC: 7005 PG/ML (ref 0–125)
POTASSIUM SERPL-SCNC: 4.5 MMOL/L (ref 3.4–5.3)
SODIUM SERPL-SCNC: 135 MMOL/L (ref 133–144)

## 2021-07-02 PROCEDURE — 99214 OFFICE O/P EST MOD 30 MIN: CPT | Performed by: NURSE PRACTITIONER

## 2021-07-02 PROCEDURE — G0463 HOSPITAL OUTPT CLINIC VISIT: HCPCS

## 2021-07-02 PROCEDURE — 36415 COLL VENOUS BLD VENIPUNCTURE: CPT | Performed by: PATHOLOGY

## 2021-07-02 PROCEDURE — 80048 BASIC METABOLIC PNL TOTAL CA: CPT | Performed by: PATHOLOGY

## 2021-07-02 PROCEDURE — 83880 ASSAY OF NATRIURETIC PEPTIDE: CPT | Performed by: PATHOLOGY

## 2021-07-02 PROCEDURE — 87040 BLOOD CULTURE FOR BACTERIA: CPT | Mod: 90 | Performed by: PATHOLOGY

## 2021-07-02 ASSESSMENT — PAIN SCALES - GENERAL: PAINLEVEL: NO PAIN (0)

## 2021-07-02 NOTE — NURSING NOTE
Chief Complaint   Patient presents with     New Patient     NEW CORE: 70 year old female presents with systolic heart failure, ef 35-40% for hospital follow up and to establish care with labs prior      Vitals were taken and medications reconciled.    Gabriel Carlos, EMT  11:36 AM

## 2021-07-02 NOTE — PROGRESS NOTES
Clinic Care Coordination Contact  [facility]/Voicemail    Left VM on pt's phone this afternoon.    PCP referral from [facility], Dr. Martinez. Note on pt's care team, Carolee Ruiz RN, cardiology specialty care coordinator is listed as a resource.  Reason for Referral: Care Transition: Inpatient to outpatient, Complex Medical Concerns/Education: Chronic diagnosis HF, CAD, DVT, Renal txp and Financial Support: Food and Medication Affordability       Additional pertinent details:  See MTM note from 7/1       Clinical Data: Care Coordinator Outreach  Outreach attempted x 1.  Left message on patient's voicemail with call back information and requested return call.  Plan:Care Coordinator will try to reach patient again in 1-2 business days.    Nyasia Main  Community Health Worker  [facility] [place] [facility]  [phone]

## 2021-07-02 NOTE — NURSING NOTE
Diet: Patient instructed regarding a heart failure healthy diet, including discussion of reduced fat and 2000 mg daily sodium restriction, daily weights, medication purpose and compliance, fluid restrictions and resources for patient and family to access for assistance with heart failure management.       Labs: Patient was given results of the laboratory testing obtained today and patient was instructed about when to return for the next laboratory testing.     Med Reconcile: Reviewed and verified all current medications with the patient. The updated medication list was printed and given to the patient. STOP coreg. RESTART metoprolol (patient to call in and clarify dose). HOLD torsemide    Return Appointment: Patient given instructions regarding scheduling next clinic visit. RTC in 1 month    Patient stated she understood all health information given and agreed to call with further questions or concerns.     Carolee Gar RN

## 2021-07-02 NOTE — PATIENT INSTRUCTIONS
Take your medicines every day, as directed    Changes made today:  o STOP coreg  o RESTART metoprolol --PLEASE CALL ME WHEN YOU ARE ABLE TO GET HOME AND CHECK WHAT DOSAGE IS ON YOUR BOTTLE  o HOLD torsemide (don't take this)     Monitor Your Weight and Symptoms    Contact us if you:      Gain 2 pounds in one day or 5 pounds in one week    Feel more short of breath    Notice more leg swelling    Feel lightheadeded   Change your lifestyle    Limit Salt or Sodium:    2000 mg  Limit Fluids:    2000 mL or approximately 64 ounces  Eat a Heart Healthy Diet    Low in saturated fats  Stay Active:    Aim to move at least 150 minutes every  week         To Contact us    During Business Hours:  [phone], option # 1 (University)  Then option # 4 (medical questions)     After hours, weekends or holidays:   [phone], Option #4  Ask to speak to the On-Call Cardiologist. Inform them you are a CORE/heart failure patient at the [facility].     [employer] allows you to communicate directly with your heart team through secure messaging.    [employer] can be accessed any time on your phone, computer, or tablet.    If you need assistance, we'd be happy to help!         Keep your Heart Appointments:    I will call you to check in regularly. Please call me if you have questions or concerns!    Follow up in CORE clinic in 1 month
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
## 2021-07-05 ENCOUNTER — PATIENT OUTREACH (OUTPATIENT)
Dept: NURSING | Facility: CLINIC | Age: 71
End: 2021-07-05
Attending: FAMILY MEDICINE
Payer: COMMERCIAL

## 2021-07-05 NOTE — PROGRESS NOTES
Clinic Care Coordination Contact  Community Health Worker Initial Outreach    Spoke with pt this morning.  CHW introduced self and intent of call regarding referral from PCP stating the following:  Reason for Referral: Care Transition: Inpatient to outpatient, Complex Medical Concerns/Education: Chronic diagnosis HF, CAD, DVT, Renal txp and Financial Support: Food and Medication Affordability   Additional pertinent details: See MTM note from 7/1    CHW introduces the role of Care Coordination.   Pt has just started working with Carolee Gar RN, Specialty Care Coordinator for Cardiology ([phone]). In order not to duplicate CC services, [facility] CHW will defer care coordination to Carolee Gar RN.  Patient understands that she will contact Carolee Gar RN, Specialty Care Coordinator for Cardiology for care coordination needs.     Patient accepts CC: Pt does not accept care coordination because she is already working with Carolee Gar RN, Specialty Care Coordinator for Cardiology for care coordination needs.    Nyasia Main  Community Health Worker  [facility]  [phone]

## 2021-07-06 ENCOUNTER — TELEPHONE (OUTPATIENT)
Dept: FAMILY MEDICINE | Facility: CLINIC | Age: 71
End: 2021-07-06

## 2021-07-06 NOTE — TELEPHONE ENCOUNTER
Reason for Call:  Form, our goal is to have forms completed with 72 hours, however, some forms may require a visit or additional information.    Type of letter, form or note:  home care Order date 7/6/21.   Order to discontinue coreg, start metoprolol tartrate     Who is the form from?: Home care    Where did the form come from: form was faxed in    What clinic location was the form placed at?: [facility]    Where the form was placed: Dr Martinez's Box/Folder    What number is listed as a contact on the form?: [phone]       Additional comments:     Call taken on 7/6/2021 at 5:29 PM by Martha Mendieta

## 2021-07-07 ENCOUNTER — MEDICAL CORRESPONDENCE (OUTPATIENT)
Dept: HEALTH INFORMATION MANAGEMENT | Facility: CLINIC | Age: 71
End: 2021-07-07

## 2021-07-08 LAB
BACTERIA SPEC CULT: NO GROWTH
Lab: NORMAL
SPECIMEN SOURCE: NORMAL

## 2021-07-09 DIAGNOSIS — I50.9 CONGESTIVE HEART FAILURE, UNSPECIFIED HF CHRONICITY, UNSPECIFIED HEART FAILURE TYPE (H): ICD-10-CM

## 2021-07-09 LAB
ANION GAP SERPL CALCULATED.3IONS-SCNC: 5 MMOL/L (ref 3–14)
BUN SERPL-MCNC: 32 MG/DL (ref 7–30)
CALCIUM SERPL-MCNC: 10 MG/DL (ref 8.5–10.1)
CHLORIDE SERPL-SCNC: 101 MMOL/L (ref 94–109)
CO2 SERPL-SCNC: 29 MMOL/L (ref 20–32)
CREAT SERPL-MCNC: 1.49 MG/DL (ref 0.52–1.04)
GFR SERPL CREATININE-BSD FRML MDRD: 36 ML/MIN/{1.73_M2}
GLUCOSE SERPL-MCNC: 98 MG/DL (ref 70–99)
POTASSIUM SERPL-SCNC: 4 MMOL/L (ref 3.4–5.3)
SODIUM SERPL-SCNC: 134 MMOL/L (ref 133–144)

## 2021-07-09 PROCEDURE — 36415 COLL VENOUS BLD VENIPUNCTURE: CPT | Performed by: PATHOLOGY

## 2021-07-09 PROCEDURE — 80048 BASIC METABOLIC PNL TOTAL CA: CPT | Performed by: PATHOLOGY

## 2021-07-14 ENCOUNTER — VIRTUAL VISIT (OUTPATIENT)
Dept: CARDIOLOGY | Facility: CLINIC | Age: 71
End: 2021-07-14
Attending: INTERNAL MEDICINE
Payer: COMMERCIAL

## 2021-07-14 DIAGNOSIS — I20.1 CORONARY ARTERY VASOSPASM (H): ICD-10-CM

## 2021-07-14 PROCEDURE — 99202 OFFICE O/P NEW SF 15 MIN: CPT | Mod: 95 | Performed by: INTERNAL MEDICINE

## 2021-07-14 RX ORDER — CLOPIDOGREL BISULFATE 75 MG/1
300 TABLET ORAL DAILY
Qty: 4 TABLET | Refills: 0 | Status: SHIPPED | OUTPATIENT
Start: 2021-07-14 | End: 2021-08-30

## 2021-07-14 RX ORDER — CLOPIDOGREL BISULFATE 75 MG/1
75 TABLET ORAL DAILY
Qty: 90 TABLET | Refills: 3 | Status: SHIPPED | OUTPATIENT
Start: 2021-07-14 | End: 2022-05-31

## 2021-07-14 NOTE — PROGRESS NOTES
"Maribel is a 70 year old who is being evaluated via a billable telephone visit.      What phone number would you like to be contacted at?   How would you like to obtain your AVS? Tuan      Subjective     70 year old woman with a history of PAD and CAD. More recently she underwent an EVAR for AAA as well as left and right femoral bypass graft. After the graft, she developed an inferior STEMI and required a PCI with stent to the RCA. She then developed another post op ST elevations and this time around she had diffuse vasospasm in all of her arteries which ultimately resolved with vasodilators. Her metoprolol was stopped and she was switched to carvedilol but she did not tolerate it very well and was switched back to metoprolol and since then has noticed any chest pain. She does have occasional SOB and had to restart her torsemide recently. She is also on Brilinta, which we are in process of switching to clopidogrel. She is improving post her surgery and her drains are removed and her antibiotics have completed. She does have residual ICM with an EF 30-35%. [place] [facility] she is being titrated on her medical therapy, interval EF will need to be rechecked once she is on maximal therapy to assess for improvement. If none, she will need an ICD.    Past Medical, social, family histories, medications, and allergies reviewed and updated  10 point ROS of systems including Constitutional, Eyes, Respiratory, Cardiovascular, Gastroenterology, Genitourinary, Integumentary, Muscularskeletal, Psychiatric were all negative except for pertinent positives noted in my HPI.        Objective    Vitals - Patient Reported  Weight (Patient Reported): 37.8 kg (83 lb 6.4 oz)  Height (Patient Reported): 157.5 cm (5' 2\")  BMI (Based on Pt Reported Ht/Wt): 15.25  Pain Score: No Pain (0)  Pain Loc: Chest    Physical Exam   healthy, alert and no distress  PSYCH: Alert and oriented times 3; coherent speech, normal   rate and volume, " able to articulate logical thoughts, able   to abstract reason, no tangential thoughts, no hallucinations   or delusions  Her affect is normal  RESP: No cough, no audible wheezing, able to talk in full sentences  Remainder of exam unable to be completed due to telephone visits    ECHO 6/23/21  Interpretation Summary  The Ejection Fraction is estimated at 30-35%.  Right ventricular function, chamber size, wall motion, and thickness are  normal.  2 cm pericardial effusion along RV free wall with organizing material in  pericardial cavity. No hemodynamic compromise. Small IVC size.  No significant changes noted.    CATH 4/25/21  Maribel Yang is a 70 year old female with a history of ESRD s/p LDKT (2004), HTN, PAD, recent inferior STEMI s/p RCA stent (4/7/21), HFrEF, AAA with prior fem-fem bypass s/p EVAR (4/6/21), SCC of R lung s/p SBRT (2014), anal canal carcinoma s/p chemoradiation (2018). She was admitted to Vascular Surgery service with R groin surgical site infection.  Patient had surgery for the infected site she developed chest pain and inferior ST elevation on EKG.  She comes to the cardiac Cath Lab for coronary angiography with possible PCI.         Pre Procedure Diagnosis    STEMI    Post Procedure Diagnosis    Inferior STEMI due to severe vasospasm   Patent RCA stent      Conclusion    Conclusions  -Single vessel CAD with patent RCA stent and severe diffuse RCA vasospasm and mild LCA/LAD/LCx vasospasm. All resolved with administration of IC nitroglycerin.     Recommendations  -Additional 90 mg of PO ticagrelor given in CCL  -Can stop heparin  -Continuous IV nitroglycerin  -Stop BB  -Consider adding calcium channel blocker for coronary spasm cautiously given negative ionotropic effect especially with nondihydropyridine CCBs  -Dual antiplatelet treatment with low-dose aspirin daily and 90 mg ticagrelor twice daily as scheduled for recently placed LIUDMILA and recent STEMI.  -Low-dose aspirin po daily  lifelong.  -Bedrest per protocol/usual post procedure care with TR band  -High intensity statin for CAD   -ACE inhibitor/ARB, and aldosterone blockade for CAD/STEMI ischemic cardiomyopathy   -Continued medical management including lifestyle modifications for CV risk factor optimizations  -Cardiac rehabilitation                Plan      Follow bedrest per protocol    Continued medical management and lifestyle modifications for cardiovascular risk factor optimizations.    Arterial sheath removed from radial artery with TR band placement.    Return to the primary inpatient team for further evaluation and managmenet        Continuation of dual antiplatelet therapy for 12 months   Post antiplatelet therapy of    Aspirin; give 81 mg qd .     Ticagrelor; and 90 mg BID.      Continue high dose statin therapy     Assessment / Plan    1. Single vessel CAD with inferior STEMI with PCI to proximal RCA  -- continue DAPT for at least 12 months  -- high intensity statin  -- BB and ACEI continues as well as Imdur    2. Printzmetal's angina, diffuse vasospasm  -- tolerating BB without incidence of pain  -- continue Imdur    3. Ischemic cardiomyopathy, severe 30-35%  -- continue ACEI and BB  -- [facility] follow up for uptitration  -- possible ICD if no improvement in EF    4. Chronic systolic heart failure, Stage C, NYHA III  -- continue torsemide   -- follow up with [facility]    5. PAD - bilateral fem bypass, EVAR for AAA  -- continue Plavix and aspirin  -- not a candidate for Pletal due to heart failure    RTC annually    Sincere Rosas MD    Phone call duration: 7 minutes

## 2021-07-14 NOTE — PATIENT INSTRUCTIONS
Patient Instructions:  It was a pleasure to see you in the cardiology clinic today.      If you have any questions, call  Padma Flores RN, at [phone].  Press Option #1 for the [facility], and then press Option #4  We are encouraging the use of [employer] to communicate with your HealthCare Provider    Note the new medications: Plavix 300 mg loading dose one time only.  Then Plavix 75 mg by mouth daily.  Stop the following medications: Brilinta    The results from today include: none  Please follow up with Dr. Rosas in one year    If you have an urgent need after hours (8:00 am to 4:30 pm) please call [phone] and ask for the cardiology fellow on call.

## 2021-07-14 NOTE — LETTER
"7/14/2021      RE: Maribel Yang  [address]  [facility] [id number]       Dear Colleague,    Thank you for the opportunity to participate in the care of your patient, Maribel Yang, at the [facility] [place] at [facility]. Please see a copy of my visit note below.    Maribel is a 70 year old who is being evaluated via a billable telephone visit.      What phone number would you like to be contacted at? [phone]  How would you like to obtain your AVS? Darrellsusan    Vitals - Patient Reported  Weight (Patient Reported): 37.8 kg (83 lb 6.4 oz)  Height (Patient Reported): 157.5 cm (5' 2\")  BMI (Based on Pt Reported Ht/Wt): 15.25  Pain Score: No Pain (0)  Pain Loc: Chest          Maribel is a 70 year old who is being evaluated via a billable telephone visit.      What phone number would you like to be contacted at?   How would you like to obtain your AVS? Tuan      Subjective     70 year old woman with a history of PAD and CAD. More recently she underwent an EVAR for AAA as well as left and right femoral bypass graft. After the graft, she developed an inferior STEMI and required a PCI with stent to the RCA. She then developed another post op ST elevations and this time around she had diffuse vasospasm in all of her arteries which ultimately resolved with vasodilators. Her metoprolol was stopped and she was switched to carvedilol but she did not tolerate it very well and was switched back to metoprolol and since then has noticed any chest pain. She does have occasional SOB and had to restart her torsemide recently. She is also on Brilinta, which we are in process of switching to clopidogrel. She is improving post her surgery and her drains are removed and her antibiotics have completed. She does have residual ICM with an EF 30-35%. She is followed by [facility], she is being titrated on her medical therapy, interval EF will need to be " "rechecked once she is on maximal therapy to assess for improvement. If none, she will need an ICD.    Past Medical, social, family histories, medications, and allergies reviewed and updated  10 point ROS of systems including Constitutional, Eyes, Respiratory, Cardiovascular, Gastroenterology, Genitourinary, Integumentary, Muscularskeletal, Psychiatric were all negative except for pertinent positives noted in my HPI.        Objective    Vitals - Patient Reported  Weight (Patient Reported): 37.8 kg (83 lb 6.4 oz)  Height (Patient Reported): 157.5 cm (5' 2\")  BMI (Based on Pt Reported Ht/Wt): 15.25  Pain Score: No Pain (0)  Pain Loc: Chest    Physical Exam   healthy, alert and no distress  PSYCH: Alert and oriented times 3; coherent speech, normal   rate and volume, able to articulate logical thoughts, able   to abstract reason, no tangential thoughts, no hallucinations   or delusions  Her affect is normal  RESP: No cough, no audible wheezing, able to talk in full sentences  Remainder of exam unable to be completed due to telephone visits    ECHO 6/23/21  Interpretation Summary  The Ejection Fraction is estimated at 30-35%.  Right ventricular function, chamber size, wall motion, and thickness are  normal.  2 cm pericardial effusion along RV free wall with organizing material in  pericardial cavity. No hemodynamic compromise. Small IVC size.  No significant changes noted.    CATH 4/25/21  Maribel Yang is a 70 year old female with a history of ESRD s/p LDKT (2004), HTN, PAD, recent inferior STEMI s/p RCA stent (4/7/21), HFrEF, AAA with prior fem-fem bypass s/p EVAR (4/6/21), SCC of R lung s/p SBRT (2014), anal canal carcinoma s/p chemoradiation (2018). She was admitted to Vascular Surgery service with R groin surgical site infection.  Patient had surgery for the infected site she developed chest pain and inferior ST elevation on EKG.  She comes to the cardiac Cath Lab for coronary angiography with possible " PCI.         Pre Procedure Diagnosis    STEMI    Post Procedure Diagnosis    Inferior STEMI due to severe vasospasm   Patent RCA stent      Conclusion    Conclusions  -Single vessel CAD with patent RCA stent and severe diffuse RCA vasospasm and mild LCA/LAD/LCx vasospasm. All resolved with administration of IC nitroglycerin.     Recommendations  -Additional 90 mg of PO ticagrelor given in CCL  -Can stop heparin  -Continuous IV nitroglycerin  -Stop BB  -Consider adding calcium channel blocker for coronary spasm cautiously given negative ionotropic effect especially with nondihydropyridine CCBs  -Dual antiplatelet treatment with low-dose aspirin daily and 90 mg ticagrelor twice daily as scheduled for recently placed LIUDMILA and recent STEMI.  -Low-dose aspirin po daily lifelong.  -Bedrest per protocol/usual post procedure care with TR band  -High intensity statin for CAD   -ACE inhibitor/ARB, and aldosterone blockade for CAD/STEMI ischemic cardiomyopathy   -Continued medical management including lifestyle modifications for CV risk factor optimizations  -Cardiac rehabilitation                Plan      Follow bedrest per protocol    Continued medical management and lifestyle modifications for cardiovascular risk factor optimizations.    Arterial sheath removed from radial artery with TR band placement.    Return to the primary inpatient team for further evaluation and managmenet        Continuation of dual antiplatelet therapy for 12 months   Post antiplatelet therapy of    Aspirin; give 81 mg qd .     Ticagrelor; and 90 mg BID.      Continue high dose statin therapy     Assessment / Plan    1. Single vessel CAD with inferior STEMI with PCI to proximal RCA  -- continue DAPT for at least 12 months  -- high intensity statin  -- BB and ACEI continues as well as Imdur    2. Printzmetal's angina, diffuse vasospasm  -- tolerating BB without incidence of pain  -- continue Imdur    3. Ischemic cardiomyopathy, severe 30-35%  --  continue ACEI and BB  -- CORE follow up for uptitration  -- possible ICD if no improvement in EF    4. Chronic systolic heart failure, Stage C, NYHA III  -- continue torsemide   -- follow up with CORE    5. PAD - bilateral fem bypass, EVAR for AAA  -- continue Plavix and aspirin  -- not a candidate for Pletal due to heart failure    RTC annually    Sincere Rosas MD    Phone call duration: 7 minutes        Please do not hesitate to contact me if you have any questions/concerns.     Sincerely,     Sincere Rosas MD

## 2021-07-14 NOTE — PROGRESS NOTES
"Maribel is a 70 year old who is being evaluated via a billable telephone visit.      What phone number would you like to be contacted at? [phone]  How would you like to obtain your AVS? Tuan    Vitals - Patient Reported  Weight (Patient Reported): 37.8 kg (83 lb 6.4 oz)  Height (Patient Reported): 157.5 cm (5' 2\")  BMI (Based on Pt Reported Ht/Wt): 15.25  Pain Score: No Pain (0)  Pain Loc: Chest        "

## 2021-07-19 NOTE — PROGRESS NOTES
Assessment & Plan     Loose stools  Loose stools after episodes of hard stools with LLQ abdominal pain - this is complicated by her medical history  Hx of anal cancer that was treated surgically - at the time had diarrhea.  She also was recently hospitalized multiple times due to complications from surgery and acute on chronic heart failure.  Will check some labs today -   Will r/o c diff as she was on extensive antibiotics  Abdominal xray today shows prior surgical stent lower aorta  CBC is normal without elevated WBC - so less likely diverticulitis  - CBC with platelets and differential; Future  - Clostridium difficile Toxin B PCR; Future  - XR Abdomen 2 Views; Future  - CBC with platelets and differential  - Clostridium difficile Toxin B PCR    LLQ abdominal pain     - CBC with platelets and differential; Future  - Clostridium difficile Toxin B PCR; Future  - XR Abdomen 2 Views; Future  - CBC with platelets and differential  - Clostridium difficile Toxin B PCR    Coronary artery vasospasm (H)   pt currently on Brillenta but switching to Plavix -   Some confusion but sounds like it was a cost issue-   Did reach out to cardiology but see that the Plavix is covered better so will take one daily.    Also she is currently on metoprolol tartate taking once daily - in the past on BID so did reach out to clarify.  - Basic metabolic panel  - Lipid panel reflex to direct LDL Fasting    Malignant neoplasm of anal canal (H)       Kidney replaced by transplant       Immunosuppression (H)       Called after visit and had to leave message  Will update as xray and labs return    40 minutes spent on the date of the encounter doing chart review, patient visit and documentation             No follow-ups on file.    Lisa Martinez, [facility]   Maribel is a 70 year old who presents for the following health issues     HPI     Abdominal/Flank Pain  Onset/Duration: 1 week   Description:  "  Character: Sharp, Dull ache, Stabbing, Gnawing, Burning, Fullness and Cramping  Location: left lower quadrant  Radiation: None  Intensity: moderate  Progression of Symptoms:  same and intermittent  Accompanying Signs & Symptoms:  Fever/Chills: no  Gas/Bloating: YES  Nausea: YES  Vomitting: no  Diarrhea: YES  Constipation: YES  Dysuria or Hematuria: no  History:   Trauma: no  Previous similar pain: YES  Previous tests done: back in 6/22/2021   Precipitating factors:   Does the pain change with:     Food: YES- worst     Bowel Movement: YES- worst     Urination: no   Other factors:  no  Therapies tried and outcome: tylenol but does not help   No LMP recorded. Patient is postmenopausal.     Pain in the left lower quadrant -   Worse when she has to half a BM-   Has had constipation - when she goes - large volume and then will end up with diarrhea  Has been taking benefiber -   Having 6-7 BM daily -   At least half the stools are large -   This AM at 11:00 has had 3 already today  Having to wake in the middle of the night to go   Symptoms for 1-2 weeks - not aware of anything that triggered symptoms        Review of Systems   Constitutional, HEENT, cardiovascular, pulmonary, GI, , musculoskeletal, neuro, skin, endocrine and psych systems are negative, except as otherwise noted.      Objective    /86   Pulse 76   Temp 97.4  F (36.3  C) (Tympanic)   Resp 15   Ht 1.575 m (5' 2\")   Wt 39.1 kg (86 lb 4 oz)   SpO2 97%   BMI 15.78 kg/m    Body mass index is 15.78 kg/m .  Physical Exam   GENERAL: alert, no distress, frail and elderly  CV: regular rates and rhythm, murmur heard best over the midchest and no peripheral edema  ABDOMEN: soft, nontender, bowel sounds normal and transplanted kidney in the LLQ                "

## 2021-07-20 ENCOUNTER — OFFICE VISIT (OUTPATIENT)
Dept: FAMILY MEDICINE | Facility: CLINIC | Age: 71
End: 2021-07-20
Payer: COMMERCIAL

## 2021-07-20 ENCOUNTER — ANCILLARY PROCEDURE (OUTPATIENT)
Dept: GENERAL RADIOLOGY | Facility: CLINIC | Age: 71
End: 2021-07-20
Attending: FAMILY MEDICINE
Payer: COMMERCIAL

## 2021-07-20 VITALS
RESPIRATION RATE: 15 BRPM | HEART RATE: 76 BPM | DIASTOLIC BLOOD PRESSURE: 86 MMHG | OXYGEN SATURATION: 97 % | HEIGHT: 62 IN | TEMPERATURE: 97.4 F | WEIGHT: 86.25 LBS | BODY MASS INDEX: 15.87 KG/M2 | SYSTOLIC BLOOD PRESSURE: 132 MMHG

## 2021-07-20 DIAGNOSIS — I20.1 CORONARY ARTERY VASOSPASM (H): ICD-10-CM

## 2021-07-20 DIAGNOSIS — R10.32 LLQ ABDOMINAL PAIN: ICD-10-CM

## 2021-07-20 DIAGNOSIS — D84.9 IMMUNOSUPPRESSION (H): ICD-10-CM

## 2021-07-20 DIAGNOSIS — C21.1 MALIGNANT NEOPLASM OF ANAL CANAL (H): ICD-10-CM

## 2021-07-20 DIAGNOSIS — Z94.0 KIDNEY REPLACED BY TRANSPLANT: ICD-10-CM

## 2021-07-20 DIAGNOSIS — R19.5 LOOSE STOOLS: Primary | ICD-10-CM

## 2021-07-20 DIAGNOSIS — R19.5 LOOSE STOOLS: ICD-10-CM

## 2021-07-20 LAB
BASOPHILS # BLD AUTO: 0 10E3/UL (ref 0–0.2)
BASOPHILS NFR BLD AUTO: 0 %
EOSINOPHIL # BLD AUTO: 0.2 10E3/UL (ref 0–0.7)
EOSINOPHIL NFR BLD AUTO: 3 %
ERYTHROCYTE [DISTWIDTH] IN BLOOD BY AUTOMATED COUNT: 17.1 % (ref 10–15)
HCT VFR BLD AUTO: 41.1 % (ref 35–47)
HGB BLD-MCNC: 12.4 G/DL (ref 11.7–15.7)
LYMPHOCYTES # BLD AUTO: 0.5 10E3/UL (ref 0.8–5.3)
LYMPHOCYTES NFR BLD AUTO: 6 %
MCH RBC QN AUTO: 27.3 PG (ref 26.5–33)
MCHC RBC AUTO-ENTMCNC: 30.2 G/DL (ref 31.5–36.5)
MCV RBC AUTO: 91 FL (ref 78–100)
MONOCYTES # BLD AUTO: 0.9 10E3/UL (ref 0–1.3)
MONOCYTES NFR BLD AUTO: 10 %
NEUTROPHILS # BLD AUTO: 7.1 10E3/UL (ref 1.6–8.3)
NEUTROPHILS NFR BLD AUTO: 81 %
PLATELET # BLD AUTO: 326 10E3/UL (ref 150–450)
RBC # BLD AUTO: 4.54 10E6/UL (ref 3.8–5.2)
WBC # BLD AUTO: 8.8 10E3/UL (ref 4–11)

## 2021-07-20 PROCEDURE — 36415 COLL VENOUS BLD VENIPUNCTURE: CPT | Performed by: FAMILY MEDICINE

## 2021-07-20 PROCEDURE — 85025 COMPLETE CBC W/AUTO DIFF WBC: CPT | Performed by: FAMILY MEDICINE

## 2021-07-20 PROCEDURE — 80048 BASIC METABOLIC PNL TOTAL CA: CPT | Performed by: FAMILY MEDICINE

## 2021-07-20 PROCEDURE — 99215 OFFICE O/P EST HI 40 MIN: CPT | Performed by: FAMILY MEDICINE

## 2021-07-20 PROCEDURE — 80061 LIPID PANEL: CPT | Performed by: FAMILY MEDICINE

## 2021-07-20 PROCEDURE — 74019 RADEX ABDOMEN 2 VIEWS: CPT | Mod: FY | Performed by: RADIOLOGY

## 2021-07-20 ASSESSMENT — MIFFLIN-ST. JEOR: SCORE: 874.48

## 2021-07-21 LAB
ANION GAP SERPL CALCULATED.3IONS-SCNC: 6 MMOL/L (ref 3–14)
BUN SERPL-MCNC: 32 MG/DL (ref 7–30)
CALCIUM SERPL-MCNC: 10.1 MG/DL (ref 8.5–10.1)
CHLORIDE BLD-SCNC: 101 MMOL/L (ref 94–109)
CHOLEST SERPL-MCNC: 141 MG/DL
CO2 SERPL-SCNC: 30 MMOL/L (ref 20–32)
CREAT SERPL-MCNC: 1.42 MG/DL (ref 0.52–1.04)
FASTING STATUS PATIENT QL REPORTED: ABNORMAL
GFR SERPL CREATININE-BSD FRML MDRD: 38 ML/MIN/1.73M2
GLUCOSE BLD-MCNC: 76 MG/DL (ref 70–99)
HDLC SERPL-MCNC: 57 MG/DL
LDLC SERPL CALC-MCNC: 46 MG/DL
NONHDLC SERPL-MCNC: 84 MG/DL
POTASSIUM BLD-SCNC: 3.9 MMOL/L (ref 3.4–5.3)
SODIUM SERPL-SCNC: 137 MMOL/L (ref 133–144)
TRIGL SERPL-MCNC: 188 MG/DL

## 2021-07-21 NOTE — RESULT ENCOUNTER NOTE
Dear Maribel,   Your test results are all back -   Xray report shows some stool - Nothing concerning.  Occasionally hard stool in the colon gets stuck and only liquid (loose or diarrhea) stools get around.  I would recommend daily fiber to see if if this helps.  Please keep me updated if symptoms worsen or change.  I got a message that you should switch to Plavix ONCE a day.  Still awaiting a question about your metoprolol.  I think you took it twice a day in the remote past but it is listed as just once a day at this time.  Let us know if you have any questions.  -Lisa Martinez, DO

## 2021-07-22 ENCOUNTER — MYC MEDICAL ADVICE (OUTPATIENT)
Dept: FAMILY MEDICINE | Facility: CLINIC | Age: 71
End: 2021-07-22

## 2021-07-26 ENCOUNTER — APPOINTMENT (OUTPATIENT)
Dept: LAB | Facility: CLINIC | Age: 71
End: 2021-07-26
Payer: COMMERCIAL

## 2021-07-26 LAB — C DIFF TOX B STL QL: NEGATIVE

## 2021-07-26 PROCEDURE — 87493 C DIFF AMPLIFIED PROBE: CPT | Performed by: FAMILY MEDICINE

## 2021-07-27 ENCOUNTER — MYC MEDICAL ADVICE (OUTPATIENT)
Dept: CARDIOLOGY | Facility: CLINIC | Age: 71
End: 2021-07-27

## 2021-07-27 ENCOUNTER — TELEPHONE (OUTPATIENT)
Dept: VASCULAR SURGERY | Facility: CLINIC | Age: 71
End: 2021-07-27
Payer: COMMERCIAL

## 2021-07-27 NOTE — TELEPHONE ENCOUNTER
[facility] Call Center    Phone Message    May a detailed message be left on voicemail: no     Reason for Call: Order(s): Home Care Orders: Skilled Nursing:  Wound care    Action Taken: Message routed to:  [facility] Vascular    Travel Screening: Not Applicable     Caller reaching out as orders submitted on 6/27/21 have not yet been returned by clinic.    Order number [id number]  Order number [id number]    Caller will re-fax orders just in case. Please sign, date and fax back to [phone] asap.    Please call Екатерина with questions. Thanks!

## 2021-08-03 ENCOUNTER — TELEPHONE (OUTPATIENT)
Dept: CARDIOLOGY | Facility: CLINIC | Age: 71
End: 2021-08-03

## 2021-08-03 NOTE — TELEPHONE ENCOUNTER
[facility] Call Center    Phone Message    May a detailed message be left on voicemail: no     Reason for Call: Other: Maribel called to schedule a [facility] appt with Marguerite Muñoz, however, unable to pull a schedule for her. Please reach out to Maribel to assist.     Action Taken: Message routed to:  [facility] Cardiology    Travel Screening: Not Applicable

## 2021-08-03 NOTE — TELEPHONE ENCOUNTER
Called and LVM to schedule with the [facility] clinic and labs and cancel the appointment for tomorrow 8/4  With Dr Zaidi , which isn't needed . ( pt saw Dr Rosas )

## 2021-08-03 NOTE — TELEPHONE ENCOUNTER
----- Message from Gabi Downs LPN sent at 8/3/2021  9:39 AM CDT -----  Regarding: Dejuan Grey,    I don't think this Pt needs to be seen by Dr Zaidi.  She just saw Dr Rosas and he noted to follow up with [facility].    Please call and reschedule Pt.    Thanks!    Gabi

## 2021-08-09 NOTE — PROGRESS NOTES
"Colon and Rectal Surgery Clinic Note      RE: Maribel Yang  : 1950  JESSEE: 8/10/2021    Maribel is a 70 year old woman who presents today for follow up of her anal cancer.     HPI:     Maribel has a past medical history of kidney transplant in , lung cancer in , cervical dysplasia, ananorectal condyloma and vulvar intraepithelial neoplasia 2. Dr. Db Urbina excised a patch if high grade perianal dysplasia in the right anterolateral position in . 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 MRI without pelvic or inguinal lymphadenopathy.     She completed chemoradiation on 18 and presents today or follow up.    Most recent CT AP on 21:  1. Large thick-walled fluid collection in the lower abdominal wall  extending from the right groin surgical site and right common femoral  artery along the femoral-femoral bypass graft to the left common  femoral artery. Surgical consultation is recommended.  2. Postprocedural changes of abdominal aortic EVAR with left uni-iliac  graft.     I last saw Maribel on 3/16/21 without evidence of recurrence.    Maribel had an AAA s/p EVAR with L-R femoral bypass graft on 21 c/b sepsis 2/2 R femoral groin access site and perigraft abscess, CAD with STEMI s/p LIUDMILA to RCA (21) and another STEMI post-operatively (21) attributed to coronary vasospasm, and HFrEF (EF 35-40%) secondary to ICM     Physical examination:  Examination was chaperoned by Vianney Worthy NP     Vitals: BP (!) 129/93 (BP Location: Left arm, Patient Position: Sitting, Cuff Size: Adult Regular)   Pulse 70   Ht 5' 2\"   Wt 89 lb 6.4 oz   SpO2 97%   BMI 16.35 kg/m    BMI= Body mass index is 16.35 kg/m .     Maribel looks considerably more " frail than previously.  She has lost ~15 pounds and BMI is down to 16.    Groin: No palpable inguinal lymphadenopathy. Bilateral scars well healed. Visual inspection of anus with external skin tags present. No visible lesions, palpation of external perianal area without lesions. ITZEL without palpable scarring or lesions. Anoscopy with normal appearing tissue, no scar visualized    Laboratory data:    Recent Labs   Lab Test 03/01/21  1508 07/22/14  1603 07/22/14  1603   WBC 5.8   < > 8.7   HGB 13.0   < > 14.4      < > 258   CR 1.38*   < > 1.60*   ALBUMIN 3.2*   < > 4.7   BILITOTAL 0.3   < > 0.4   ALKPHOS 132   < > 153*   ALT 21   < > 21   AST 17   < > 24   INR  --   --  0.94    < > = values in this interval not displayed.       Assessment/plan:    No evidence of recurrence on exam today. Repeat anoscopy, ITZEL,  and inguinal node exam in 6 months. Continue to follow with oncology with Dr. Tucker. Patient's questions were answered to her stated satisfaction and she is in agreement with this plan.    For details of past medical history, surgical history, family history, medications, allergies, and review of systems, please see details below.    Medical history:  Past Medical History:   Diagnosis Date     Abnormal coagulation profile     p [id number] heterozygote      Age-related osteoporosis without current pathological fracture 6/22/2019     Anemia      Antiplatelet or antithrombotic long-term use      ASCUS with positive high risk HPV 2007, 2015    + HPV 56, 54,& 6, colp - TAL III, Leep =TAL II     Basal cell carcinoma      Congestive heart failure, unspecified HF chronicity, unspecified heart failure type (H) 6/22/2021     Depressive disorder July 2015     Hypertension      Immunosuppressed status (H)     due meds     Kidney replaced by transplant 9/04    Living donor recipient,  Rejection 7/2005     LSIL (low grade squamous intraepithelial lesion) on Pap smear 4/2013    +HPV 33 or 45, 61       PAD (peripheral  artery disease) (H)      PONV (postoperative nausea and vomiting)      Squamous cell lung cancer (H)      Thrombosis of leg 1967     Unspecified disorder of kidney and ureter     X-linked dominant Alport's syndrome.       Surgical history:  Past Surgical History:   Procedure Laterality Date     BIOPSY      Kidney, Lung, Breast     BIOPSY ANAL N/A 03/14/2018    Procedure: BIOPSY ANAL;;  Surgeon: Shabbir Leo MD;  Location: [facility] OR     BYPASS GRAFT FEMORAL FEMORAL Bilateral 04/25/2021    Procedure: Axplantation infected graft, femoral to femoral bypass with cadaveric artery, bilateral SATORIUS MUSCLE FLAP CREATION, evacuation of absece, vacuum closure;  Surgeon: Raven Lewis MD;  Location:  OR      TRANSPLANTATION OF KIDNEY  09/2004    recipient -- done at [facility]     COLONOSCOPY       COLONOSCOPY N/A 08/09/2017    Procedure: COMBINED COLONOSCOPY, SINGLE OR MULTIPLE BIOPSY/POLYPECTOMY BY BIOPSY;;  Surgeon: Sushil Hyatt MD;  Location:  GI     COLPOSCOPY,LOOP ELECTRD CERVIX EXCIS  03/11/2008    TAL II     CONIZATION LEEP  07/17/2013    Procedure: CONIZATION LEEP;;  Surgeon: Liliana Renteria MD;  Location:  OR     CONIZATION LEEP N/A 08/17/2016    Procedure: CONIZATION LEEP;  Surgeon: Liliana Renteria MD;  Location:  OR     CV CORONARY ANGIOGRAM N/A 04/06/2021    Procedure: CV CORONARY ANGIOGRAM;  Surgeon: Sincere Rosas MD;  Location:  HEART CARDIAC CATH LAB     CV CORONARY ANGIOGRAM N/A 04/25/2021    Procedure: Coronary Angiogram;  Surgeon: Sincere Rosas MD;  Location:  HE[facility]ARD[facility] CATH LAB     CV PCI STENT DRUG ELUTING N/A 04/06/2021    Procedure: Percutaneous Coronary Intervention Stent Drug Eluting;  Surgeon: Sincere Rosas MD;  Location:  HEART CARDIAC CATH LAB     ENDOVASCULAR REPAIR ANEURYSM AORTOILIAC Bilateral 04/06/2021    Procedure: Endovascular Abdominal Aortic Aneurysm Repair with Aortouniiliac Device and Femoral-Femoral  Artery Bypass with [id number] Artegraft;  Surgeon: Abena Ferrara MD;  Location: [facility] OR     EXAM UNDER ANESTHESIA ANUS  07/15/2014    Procedure: EXAM UNDER ANESTHESIA ANUS;  Surgeon: Radha Musa MD;  Location: [facility]     EXAM UNDER ANESTHESIA ANUS N/A 03/14/2018    Procedure: EXAM UNDER ANESTHESIA ANUS;  Anal Exam Under Anesthesia With Excision of anal lesion, proctoscopy;  Surgeon: Shabbir Leo MD;  Location: [facility] OR     EYE SURGERY       GENITOURINARY SURGERY       IR OR ANGIOGRAM  04/06/2021     IRRIGATION AND DEBRIDEMENT GROIN Right 04/24/2021    Procedure: IRRIGATION AND DEBRIDEMENT, INGUINAL REGION, I & D PF RIGHT GROIN;  Surgeon: Raven Lewis MD;  Location: [facility]     IRRIGATION AND DEBRIDEMENT GROIN N/A 04/26/2021    Procedure: IRRIGATION AND DEBRIDEMENT, INGUINAL REGION, PLACEMENT OF VERAFLO WOUND VAC, WOUND WASHOUT,;  Surgeon: Raven Lewis MD;  Location: [facility]     LASER CO2 EXCISE VULVA WIDE LOCAL  07/15/2014    Procedure: LASER CO2 EXCISE VULVA WIDE LOCAL;  Surgeon: Liliana Renteria MD;  [facility]: [facility]     LASER CO2 VAGINA  07/17/2013    Procedure: LASER CO2 VAGINA;;  Surgeon: Liliana Renteria MD;  Location: [facility]     LASER CO2 VAGINA N/A 09/25/2018    Procedure: LASER CO2 VAGINA;  Exam Under Anesthesia, CO2 Laser Ablation of Upper Vagina and Cervix;  Surgeon: Pati Garcia MD;  Location: [facility]     MICROSCOPY ANAL  07/17/2013    Procedure: MICROSCOPY ANAL;  Anal Microscopy,  EUA vagina,Colposcopy Of Vagina And Vulva, Vaginal Biopsies, Omniguide Co2 Laser To Vagina and vulva, Loop Electrosurgical Excision Procedure To Cervix;  Surgeon: Radha Musa MD;  Location: [facility]     MICROSCOPY ANAL  07/15/2014    Procedure: MICROSCOPY ANAL;  Surgeon: Radha Musa MD;  Location: [facility]     PICC DOUBLE LUMEN PLACEMENT Right 04/30/2021    39cm, Basilic vein     TRANSESOPHAGEAL ECHOCARDIOGRAM INTRAOPERATIVE N/A 04/28/2021    Procedure: ECHOCARDIOGRAM, TRANSESOPHAGEAL,  INTRAOPERATIVE;  Surgeon: GENERIC ANESTHESIA PROVIDER;  Location: [facility] OR     VASCULAR SURGERY      Thrombectomy     ZZ NONSPECIFIC PROCEDURE      Thrombectomy     Z NONSPECIFIC PROCEDURE   and     Bilater eye surgery - correction for crossed eyes     Z NONSPECIFIC PROCEDURE      oopherectomy L     Z NONSPECIFIC PROCEDURE  1967    open kidney biopsy - L       Family history:  Family History   Problem Relation Age of Onset     Diabetes Father         type 2 diag age,60's     Alcohol/Drug Father      Arthritis Father      Hypertension Father      Lipids Father         high cholesterol     Arthritis Mother      Diabetes Mother      Depression Mother      Heart Disease Mother      Neurologic Disorder Mother      Obesity Mother      Psychotic Disorder Mother      Thyroid Disease Mother      Hypertension Mother      Gynecology Sister         Precancerous cell removal from cervix at age 45     Depression Sister      Allergies Sister      Alcohol/Drug Sister      Neurologic Disorder Sister      Cerebrovascular Disease Paternal Grandmother          of a stroke in her 80's     Diabetes Paternal Grandmother      Alcohol/Drug Son      Colon Polyps Sister      Breast Cancer Niece      Other Cancer Sister         Cervical     Obesity Sister      Depression Sister      Substance Abuse Son      Substance Abuse Sister      Asthma Other      Colon Cancer No family hx of      Crohn's Disease No family hx of      Ulcerative Colitis No family hx of      Melanoma No family hx of      Skin Cancer No family hx of        Medications:  Current Outpatient Medications   Medication Sig Dispense Refill     acetaminophen (TYLENOL) 325 MG tablet Take 2 tablets (650 mg) by mouth every 4 hours as needed for other (For optimal non-opioid multimodal pain management to improve pain control.) 100 tablet 3     aspirin (ASA) 81 MG chewable tablet Take 1 tablet (81 mg) by mouth daily 90 tablet 3     atorvastatin (LIPITOR) 80 MG tablet  Take 1 tablet (80 mg) by mouth daily 60 tablet 4     calcium carbonate 600 mg-vitamin D 400 units (CALTRATE) 600-400 MG-UNIT per tablet Take 1 tablet by mouth 2 times daily 120 tablet 3     clopidogrel (PLAVIX) 75 MG tablet Take 4 tablets (300 mg) by mouth daily Take 4 tablet the first day for a loading dose 4 tablet 0     clopidogrel (PLAVIX) 75 MG tablet Take 1 tablet (75 mg) by mouth daily 90 tablet 3     fluticasone (FLONASE) 50 MCG/ACT nasal spray Spray 1 spray into both nostrils daily 18.2 mL 3     isosorbide mononitrate (IMDUR) 60 MG 24 hr tablet Take 1 tablet (60 mg) by mouth daily 60 tablet 4     lisinopril (ZESTRIL) 10 MG tablet Take 1 tablet (10 mg) by mouth daily 90 tablet 3     metoprolol tartrate (LOPRESSOR) 100 MG tablet Take 1 tablet (100 mg) by mouth 2 times daily 180 tablet 1     mycophenolic acid (GENERIC EQUIVALENT) 360 MG EC tablet Take 1 tablet (360 mg) by mouth 2 times daily 60 tablet 3     Nutritional Supplements (ENSURE CLEAR) LIQD Take 1 Bottle by mouth daily 396 mL 11     pantoprazole (PROTONIX) 40 MG EC tablet Take 1 tablet (40 mg) by mouth every morning (before breakfast) 60 tablet 3     predniSONE (DELTASONE) 5 MG tablet Take 1 tablet (5 mg) by mouth daily 90 tablet 3     torsemide (DEMADEX) 10 MG tablet Take 1 tablet (10 mg) by mouth daily 90 tablet 1       Allergies:  The patientis allergic to blood transfusion related (informational only), ultracet, and hydrocodone.    Social history:  Social History     Tobacco Use     Smoking status: Former Smoker     Packs/day: 0.30     Years: 35.00     Pack years: 10.50     Types: Cigarettes     Start date: 1/1/1967     Smokeless tobacco: Never Used     Tobacco comment: Couple times a week. 1-2 cigs 1-2x a week.   Substance Use Topics     Alcohol use: Not Currently     Alcohol/week: 0.0 standard drinks     Comment: rarely     Marital status: .    Review of Systems:  Nursing Notes:   Maria G Lee  8/10/2021  2:08 PM  Sign at exiting of  "workspace  Chief Complaint   Patient presents with     Cancer     follow up on anal cancer       Vitals:    08/10/21 1406   BP: (!) 129/93   BP Location: Left arm   Patient Position: Sitting   Cuff Size: Adult Regular   Pulse: 70   SpO2: 97%   Weight: 89 lb 6.4 oz   Height: 5' 2\"       Body mass index is 16.35 kg/m .    Maria G Lee CMA         20 minutes spent on the date of the encounter doing chart review, history and exam, documentation, review of imaging, and further activities as noted above.     Julisa Machuca MD  General Surgery PGY1  Pager [phone]        I saw and examined the patient, led the discussion and edited the resident note.  I agree with the assessment and plan as outlined.    Shabbir Leo MD   Professor and Chief  Division of Colon and Rectal Surgery  [facility]      Referring Provider:  No referring provider defined for this encounter.     Primary Care Provider:  Lisa Martinez    This note was created using speech recognition software and may contain unintended word substitutions.  "

## 2021-08-10 ENCOUNTER — OFFICE VISIT (OUTPATIENT)
Dept: SURGERY | Facility: CLINIC | Age: 71
End: 2021-08-10
Payer: COMMERCIAL

## 2021-08-10 VITALS
BODY MASS INDEX: 16.45 KG/M2 | HEART RATE: 70 BPM | DIASTOLIC BLOOD PRESSURE: 93 MMHG | OXYGEN SATURATION: 97 % | WEIGHT: 89.4 LBS | SYSTOLIC BLOOD PRESSURE: 129 MMHG | HEIGHT: 62 IN

## 2021-08-10 DIAGNOSIS — I71.40 ABDOMINAL AORTIC ANEURYSM (AAA) WITHOUT RUPTURE (H): ICD-10-CM

## 2021-08-10 DIAGNOSIS — Z94.0 KIDNEY REPLACED BY TRANSPLANT: ICD-10-CM

## 2021-08-10 DIAGNOSIS — Z85.118 HISTORY OF LUNG CANCER: ICD-10-CM

## 2021-08-10 DIAGNOSIS — C21.1 MALIGNANT NEOPLASM OF ANAL CANAL (H): Primary | ICD-10-CM

## 2021-08-10 PROCEDURE — 99213 OFFICE O/P EST LOW 20 MIN: CPT | Performed by: COLON & RECTAL SURGERY

## 2021-08-10 ASSESSMENT — PAIN SCALES - GENERAL: PAINLEVEL: NO PAIN (0)

## 2021-08-10 ASSESSMENT — MIFFLIN-ST. JEOR: SCORE: 888.77

## 2021-08-10 NOTE — NURSING NOTE
"Chief Complaint   Patient presents with     Cancer     follow up on anal cancer       Vitals:    08/10/21 1406   BP: (!) 129/93   BP Location: Left arm   Patient Position: Sitting   Cuff Size: Adult Regular   Pulse: 70   SpO2: 97%   Weight: 89 lb 6.4 oz   Height: 5' 2\"       Body mass index is 16.35 kg/m .    Maria G Lee CMA    "

## 2021-08-10 NOTE — LETTER
8/10/2021       RE: Maribel Yang  [address]  [facility] [address]     Dear Colleague,    Thank you for referring your patient, Maribel Yang, to the [facility] at [facility]. Please see a copy of my visit note below.    Colon and Rectal Surgery Clinic Note      RE: Maribel Yang  : 1950  JESSEE: 8/10/2021    Maribel is a 70 year old woman who presents today for follow up of her anal cancer.     HPI:     Maribel has a past medical history of kidney transplant in , lung cancer in , cervical dysplasia, ananorectal condyloma and vulvar intraepithelial neoplasia 2. Dr. Db Urbina excised a patch if high grade perianal dysplasia in the right anterolateral position in . 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 MRI without pelvic or inguinal lymphadenopathy.     She completed chemoradiation on 18 and presents today or follow up.    Most recent CT AP on 21:  1. Large thick-walled fluid collection in the lower abdominal wall  extending from the right groin surgical site and right common femoral  artery along the femoral-femoral bypass graft to the left common  femoral artery. Surgical consultation is recommended.  2. Postprocedural changes of abdominal aortic EVAR with left uni-iliac  graft.     I last saw Maribel on 3/16/21 without evidence of recurrence.    Maribel had an AAA s/p EVAR with L-R femoral bypass graft on 21 c/b sepsis 2/2 R femoral groin access site and perigraft abscess, CAD with STEMI s/p LIUDMILA to RCA (21) and another STEMI post-operatively (21) attributed to coronary vasospasm, and HFrEF (EF 35-40%) secondary to  "[facility]     Physical examination:  Examination was chaperoned by Vianney Worthy NP     Vitals: BP (!) 129/93 (BP Location: Left arm, Patient Position: Sitting, Cuff Size: Adult Regular)   Pulse 70   Ht 5' 2\"   Wt 89 lb 6.4 oz   SpO2 97%   BMI 16.35 kg/m    BMI= Body mass index is 16.35 kg/m .     Maribel looks considerably more frail than previously.  She has lost ~15 pounds and BMI is down to 16.    Groin: No palpable inguinal lymphadenopathy. Bilateral scars well healed. Visual inspection of anus with external skin tags present. No visible lesions, palpation of external perianal area without lesions. ITZEL without palpable scarring or lesions. Anoscopy with normal appearing tissue, no scar visualized    Laboratory data:    Recent Labs   Lab Test 03/01/21  1508 07/22/14  1603 07/22/14  1603   WBC 5.8   < > 8.7   HGB 13.0   < > 14.4      < > 258   CR 1.38*   < > 1.60*   ALBUMIN 3.2*   < > 4.7   BILITOTAL 0.3   < > 0.4   ALKPHOS 132   < > 153*   ALT 21   < > 21   AST 17   < > 24   INR  --   --  0.94    < > = values in this interval not displayed.       Assessment/plan:    No evidence of recurrence on exam today. Repeat anoscopy, ITZEL,  and inguinal node exam in 6 months. Continue to follow with oncology with Dr. Tucker. Patient's questions were answered to her stated satisfaction and she is in agreement with this plan.    For details of past medical history, surgical history, family history, medications, allergies, and review of systems, please see details below.    Medical history:  Past Medical History:   Diagnosis Date     Abnormal coagulation profile     p [id number] heterozygote      Age-related osteoporosis without current pathological fracture 6/22/2019     Anemia      Antiplatelet or antithrombotic long-term use      ASCUS with positive high risk HPV 2007, 2015    + HPV 56, 54,& 6, colp - TAL III, Leep =TAL II     Basal cell carcinoma      Congestive heart failure, unspecified HF chronicity, " unspecified heart failure type (H) 6/22/2021     Depressive disorder July 2015     Hypertension      Immunosuppressed status (H)     due meds     Kidney replaced by transplant 9/04    Living donor recipient,  Rejection 7/2005     LSIL (low grade squamous intraepithelial lesion) on Pap smear 4/2013    +HPV 33 or 45, 61       PAD (peripheral artery disease) (H)      PONV (postoperative nausea and vomiting)      Squamous cell lung cancer (H)      Thrombosis of leg 1967     Unspecified disorder of kidney and ureter     X-linked dominant Alport's syndrome.       Surgical history:  Past Surgical History:   Procedure Laterality Date     BIOPSY      Kidney, Lung, Breast     BIOPSY ANAL N/A 03/14/2018    Procedure: BIOPSY ANAL;;  Surgeon: Shabbir Leo MD;  Location:  OR     BYPASS GRAFT FEMORAL FEMORAL Bilateral 04/25/2021    Procedure: Axplantation infected graft, femoral to femoral bypass with cadaveric artery, bilateral SATORIUS MUSCLE FLAP CREATION, evacuation of absece, vacuum closure;  Surgeon: Raven Lewis MD;  Location:  OR      TRANSPLANTATION OF KIDNEY  09/2004    recipient -- done at [facility]     COLONOSCOPY       COLONOSCOPY N/A 08/09/2017    Procedure: COMBINED COLONOSCOPY, SINGLE OR MULTIPLE BIOPSY/POLYPECTOMY BY BIOPSY;;  Surgeon: Sushil Hyatt MD;  Location:  GI     COLPOSCOPY,LOOP ELECTRD CERVIX EXCIS  03/11/2008    TAL II     CONIZATION LEEP  07/17/2013    Procedure: CONIZATION LEEP;;  Surgeon: Liliana Renteria MD;  Location:  OR     CONIZATION LEEP N/A 08/17/2016    Procedure: CONIZATION LEEP;  Surgeon: Liliana Renteria MD;  Location:  OR     CV CORONARY ANGIOGRAM N/A 04/06/2021    Procedure: CV CORONARY ANGIOGRAM;  Surgeon: Sincere Rosas MD;  Location:  HEART CARDIAC CATH LAB     CV CORONARY ANGIOGRAM N/A 04/25/2021    Procedure: Coronary Angiogram;  Surgeon: Sincere Rosas MD;  Location:  HEART CARDIAC CATH LAB     CV PCI STENT DRUG  ELUTING N/A 04/06/2021    Procedure: Percutaneous Coronary Intervention Stent Drug Eluting;  Surgeon: Sincere Rosas MD;  [facility]: [facility]ART [facility]     ENDOVASCULAR REPAIR ANEURYSM AORTOILIAC Bilateral 04/06/2021    Procedure: Endovascular Abdominal Aortic Aneurysm Repair with Aortouniiliac Device and Femoral-Femoral Artery Bypass with [id number] Artegraft;  Surgeon: Abena Ferrara MD;  Location: [facility] OR     EXAM UNDER ANESTHESIA ANUS  07/15/2014    Procedure: EXAM UNDER ANESTHESIA ANUS;  Surgeon: Radha Musa MD;  Location: [facility] OR     EXAM UNDER ANESTHESIA ANUS N/A 03/14/2018    Procedure: EXAM UNDER ANESTHESIA ANUS;  Anal Exam Under Anesthesia With Excision of anal lesion, proctoscopy;  Surgeon: Shabbir Leo MD;  Location: [facility] OR     EYE SURGERY       GENITOURINARY SURGERY       IR OR ANGIOGRAM  04/06/2021     IRRIGATION AND DEBRIDEMENT GROIN Right 04/24/2021    Procedure: IRRIGATION AND DEBRIDEMENT, INGUINAL REGION, I & D PF RIGHT GROIN;  Surgeon: Raven Lewis MD;  Location: [facility] OR     IRRIGATION AND DEBRIDEMENT GROIN N/A 04/26/2021    Procedure: IRRIGATION AND DEBRIDEMENT, INGUINAL REGION, PLACEMENT OF VERAFLO WOUND VAC, WOUND WASHOUT,;  Surgeon: Raven Lewis MD;  Location: [facility] OR     LASER CO2 EXCISE VULVA WIDE LOCAL  07/15/2014    Procedure: LASER CO2 EXCISE VULVA WIDE LOCAL;  Surgeon: Liliana Renteria MD;  [facility]: [facility] OR     LASER CO2 VAGINA  07/17/2013    Procedure: LASER CO2 VAGINA;;  Surgeon: Liliana Renteria MD;  [facility]: [facility]     LASER CO2 VAGINA N/A 09/25/2018    Procedure: LASER CO2 VAGINA;  Exam Under Anesthesia, CO2 Laser Ablation of Upper Vagina and Cervix;  Surgeon: Pati Garcia MD;  Location: [facility] OR     MICROSCOPY ANAL  07/17/2013    Procedure: MICROSCOPY ANAL;  Anal Microscopy,  EUA vagina,Colposcopy Of Vagina And Vulva, Vaginal Biopsies, Omniguide Co2 Laser To Vagina and vulva, Loop Electrosurgical Excision Procedure To Cervix;  Surgeon:  Radha Musa MD;  [facility]: [facility]     MICROSCOPY ANAL  07/15/2014    Procedure: MICROSCOPY ANAL;  Surgeon: Radha Musa MD;  Location: [facility] OR     PICC DOUBLE LUMEN PLACEMENT Right 2021    39cm, Basilic vein     TRANSESOPHAGEAL ECHOCARDIOGRAM INTRAOPERATIVE N/A 2021    Procedure: ECHOCARDIOGRAM, TRANSESOPHAGEAL, INTRAOPERATIVE;  Surgeon: GENERIC ANESTHESIA PROVIDER;  Location: [facility] OR     VASCULAR SURGERY      Thrombectomy     ZZC NONSPECIFIC PROCEDURE      Thrombectomy     ZZC NONSPECIFIC PROCEDURE   and     Bilater eye surgery - correction for crossed eyes     ZZC NONSPECIFIC PROCEDURE      oopherectomy L     ZZC NONSPECIFIC PROCEDURE      open kidney biopsy - L       Family history:  Family History   Problem Relation Age of Onset     Diabetes Father         type 2 diag age,60's     Alcohol/Drug Father      Arthritis Father      Hypertension Father      Lipids Father         high cholesterol     Arthritis Mother      Diabetes Mother      Depression Mother      Heart Disease Mother      Neurologic Disorder Mother      Obesity Mother      Psychotic Disorder Mother      Thyroid Disease Mother      Hypertension Mother      Gynecology Sister         Precancerous cell removal from cervix at age 45     Depression Sister      Allergies Sister      Alcohol/Drug Sister      Neurologic Disorder Sister      Cerebrovascular Disease Paternal Grandmother          of a stroke in her 80's     Diabetes Paternal Grandmother      Alcohol/Drug Son      Colon Polyps Sister      Breast Cancer Niece      Other Cancer Sister         Cervical     Obesity Sister      Depression Sister      Substance Abuse Son      Substance Abuse Sister      Asthma Other      Colon Cancer No family hx of      Crohn's Disease No family hx of      Ulcerative Colitis No family hx of      Melanoma No family hx of      Skin Cancer No family hx of        Medications:  Current Outpatient Medications    Medication Sig Dispense Refill     acetaminophen (TYLENOL) 325 MG tablet Take 2 tablets (650 mg) by mouth every 4 hours as needed for other (For optimal non-opioid multimodal pain management to improve pain control.) 100 tablet 3     aspirin (ASA) 81 MG chewable tablet Take 1 tablet (81 mg) by mouth daily 90 tablet 3     atorvastatin (LIPITOR) 80 MG tablet Take 1 tablet (80 mg) by mouth daily 60 tablet 4     calcium carbonate 600 mg-vitamin D 400 units (CALTRATE) 600-400 MG-UNIT per tablet Take 1 tablet by mouth 2 times daily 120 tablet 3     clopidogrel (PLAVIX) 75 MG tablet Take 4 tablets (300 mg) by mouth daily Take 4 tablet the first day for a loading dose 4 tablet 0     clopidogrel (PLAVIX) 75 MG tablet Take 1 tablet (75 mg) by mouth daily 90 tablet 3     fluticasone (FLONASE) 50 MCG/ACT nasal spray Spray 1 spray into both nostrils daily 18.2 mL 3     isosorbide mononitrate (IMDUR) 60 MG 24 hr tablet Take 1 tablet (60 mg) by mouth daily 60 tablet 4     lisinopril (ZESTRIL) 10 MG tablet Take 1 tablet (10 mg) by mouth daily 90 tablet 3     metoprolol tartrate (LOPRESSOR) 100 MG tablet Take 1 tablet (100 mg) by mouth 2 times daily 180 tablet 1     mycophenolic acid (GENERIC EQUIVALENT) 360 MG EC tablet Take 1 tablet (360 mg) by mouth 2 times daily 60 tablet 3     Nutritional Supplements (ENSURE CLEAR) LIQD Take 1 Bottle by mouth daily 396 mL 11     pantoprazole (PROTONIX) 40 MG EC tablet Take 1 tablet (40 mg) by mouth every morning (before breakfast) 60 tablet 3     predniSONE (DELTASONE) 5 MG tablet Take 1 tablet (5 mg) by mouth daily 90 tablet 3     torsemide (DEMADEX) 10 MG tablet Take 1 tablet (10 mg) by mouth daily 90 tablet 1       Allergies:  The patientis allergic to blood transfusion related (informational only), ultracet, and hydrocodone.    Social history:  Social History     Tobacco Use     Smoking status: Former Smoker     Packs/day: 0.30     Years: 35.00     Pack years: 10.50     Types: Cigarettes  "    Start date: 1/1/1967     Smokeless tobacco: Never Used     Tobacco comment: Couple times a week. 1-2 cigs 1-2x a week.   Substance Use Topics     Alcohol use: Not Currently     Alcohol/week: 0.0 standard drinks     Comment: rarely     Marital status: .    Review of Systems:  Nursing Notes:   Maria G Lee  8/10/2021  2:08 PM  Sign at exiting of workspace  Chief Complaint   Patient presents with     Cancer     follow up on anal cancer       Vitals:    08/10/21 1406   BP: (!) 129/93   BP Location: Left arm   Patient Position: Sitting   Cuff Size: Adult Regular   Pulse: 70   SpO2: 97%   Weight: 89 lb 6.4 oz   Height: 5' 2\"       Body mass index is 16.35 kg/m .    Maria G Lee CMA         20 minutes spent on the date of the encounter doing chart review, history and exam, documentation, review of imaging, and further activities as noted above.     Julisa Machuca MD  General Surgery PGY1  Pager [phone]        I saw and examined the patient, led the discussion and edited the resident note.  I agree with the assessment and plan as outlined.    Shabbir Leo MD   Professor and Chief  Division of Colon and Rectal Surgery  [facility]      Referring Provider:  No referring provider defined for this encounter.     Primary Care Provider:  Lisa Martinez    This note was created using speech recognition software and may contain unintended word substitutions.    "

## 2021-08-12 NOTE — PROGRESS NOTES
This is a recent snapshot of the patient's [facility] medical record.  For current drug dose and complete information and questions, call [phone] or In Basket pool, [facility] home infusion ([id number])  CSN Number:  [id number]

## 2021-08-13 ENCOUNTER — TELEPHONE (OUTPATIENT)
Dept: CARDIOLOGY | Facility: CLINIC | Age: 71
End: 2021-08-13

## 2021-08-13 NOTE — TELEPHONE ENCOUNTER
Pharmacy contacted me back.  Per help desk they need a PA.  Called insurance plan again.  This time different insurance rep stated a PA was needed for quantity.  Completed the PA via phone.    PA Initiation    Medication: clopidogrel (PLAVIX) 75 MG tablet--NO PA NEEDED  Insurance Company: [employer] - Phone [phone] Fax [phone]  Pharmacy Filling the Rx: [facility] - [facility] [address]  Filling Pharmacy Phone: [phone]  Filling Pharmacy Fax: [phone]  Start Date: 8/13/2021

## 2021-08-13 NOTE — TELEPHONE ENCOUNTER
Prior Authorization Not Needed per Insurance    Medication: clopidogrel (PLAVIX) 75 MG tablet--NO PA NEEDED  Insurance Company: [employer] - Phone [phone] Fax [phone]  Expected CoPay:      Pharmacy Filling the Rx: [facility] - [place], [place] - [address]   Pharmacy Notified: Yes  Patient Notified: Yes **Instructed pharmacy to notify patient when script is ready to /ship.**    Per insurance rep pharmacy needs to call help desk for assistance in DUR reject code. Relayed info to pharmacy.

## 2021-08-16 NOTE — TELEPHONE ENCOUNTER
Prior Authorization Approval    Authorization Effective Date: 7/14/2021  Authorization Expiration Date: 8/13/2022  Medication: clopidogrel (PLAVIX) 75 MG tablet--APPROVED  Approved Dose/Quantity:   Reference #:     Insurance Company: [employer] - Phone [phone] Fax [phone]  Expected CoPay:       CoPay Card Available:      Foundation Assistance Needed:    Which Pharmacy is filling the prescription (Not needed for infusion/clinic administered): [facility] - [facility] [address]  Pharmacy Notified: Yes  Patient Notified: Yes **Instructed pharmacy to notify patient when script is ready to /ship.**

## 2021-08-23 ASSESSMENT — ENCOUNTER SYMPTOMS
VOMITING: 0
PALPITATIONS: 0
CONSTIPATION: 1
NAUSEA: 0
ABDOMINAL PAIN: 1
HYPERTENSION: 0
LEG PAIN: 0
POOR WOUND HEALING: 0
PANIC: 0
WEAKNESS: 1
MEMORY LOSS: 0
NAIL CHANGES: 0
BOWEL INCONTINENCE: 1
SPEECH CHANGE: 0
INSOMNIA: 0
NUMBNESS: 0
SKIN CHANGES: 1
EXERCISE INTOLERANCE: 0
BLOOD IN STOOL: 0
HEADACHES: 0
ORTHOPNEA: 1
TREMORS: 0
LIGHT-HEADEDNESS: 1
RECTAL PAIN: 0
SEIZURES: 0
DECREASED CONCENTRATION: 0
SLEEP DISTURBANCES DUE TO BREATHING: 1
DIARRHEA: 1
DISTURBANCES IN COORDINATION: 0
JAUNDICE: 0
LOSS OF CONSCIOUSNESS: 0
SYNCOPE: 1
HYPOTENSION: 0
BLOATING: 1
NERVOUS/ANXIOUS: 0
HEARTBURN: 0
DIZZINESS: 1
DEPRESSION: 1
PARALYSIS: 0
TINGLING: 0

## 2021-08-25 ENCOUNTER — LAB (OUTPATIENT)
Dept: LAB | Facility: CLINIC | Age: 71
End: 2021-08-25
Attending: NURSE PRACTITIONER

## 2021-08-25 ENCOUNTER — ANCILLARY PROCEDURE (OUTPATIENT)
Dept: CT IMAGING | Facility: CLINIC | Age: 71
End: 2021-08-25
Attending: SURGERY
Payer: COMMERCIAL

## 2021-08-25 ENCOUNTER — OFFICE VISIT (OUTPATIENT)
Dept: CARDIOLOGY | Facility: CLINIC | Age: 71
End: 2021-08-25
Attending: NURSE PRACTITIONER
Payer: COMMERCIAL

## 2021-08-25 VITALS
OXYGEN SATURATION: 98 % | DIASTOLIC BLOOD PRESSURE: 55 MMHG | HEART RATE: 69 BPM | BODY MASS INDEX: 16.38 KG/M2 | HEIGHT: 62 IN | WEIGHT: 89 LBS | SYSTOLIC BLOOD PRESSURE: 100 MMHG

## 2021-08-25 DIAGNOSIS — I50.9 CONGESTIVE HEART FAILURE, UNSPECIFIED HF CHRONICITY, UNSPECIFIED HEART FAILURE TYPE (H): ICD-10-CM

## 2021-08-25 DIAGNOSIS — I71.40 ABDOMINAL AORTIC ANEURYSM (AAA) WITHOUT RUPTURE (H): ICD-10-CM

## 2021-08-25 DIAGNOSIS — I20.1 CORONARY ARTERY VASOSPASM (H): ICD-10-CM

## 2021-08-25 DIAGNOSIS — I73.9 PERIPHERAL ARTERY DISEASE (H): ICD-10-CM

## 2021-08-25 LAB
ANION GAP SERPL CALCULATED.3IONS-SCNC: 5 MMOL/L (ref 3–14)
BUN SERPL-MCNC: 42 MG/DL (ref 7–30)
CALCIUM SERPL-MCNC: 9.2 MG/DL (ref 8.5–10.1)
CHLORIDE BLD-SCNC: 98 MMOL/L (ref 94–109)
CO2 SERPL-SCNC: 31 MMOL/L (ref 20–32)
CREAT BLD-MCNC: 1.7 MG/DL (ref 0.5–1)
CREAT SERPL-MCNC: 1.57 MG/DL (ref 0.52–1.04)
ERYTHROCYTE [DISTWIDTH] IN BLOOD BY AUTOMATED COUNT: 17.1 % (ref 10–15)
GFR SERPL CREATININE-BSD FRML MDRD: 31 ML/MIN/1.73M2
GFR SERPL CREATININE-BSD FRML MDRD: 34 ML/MIN/1.73M2
GLUCOSE BLD-MCNC: 107 MG/DL (ref 70–99)
HCT VFR BLD AUTO: 37.4 % (ref 35–47)
HGB BLD-MCNC: 11.7 G/DL (ref 11.7–15.7)
MCH RBC QN AUTO: 26.8 PG (ref 26.5–33)
MCHC RBC AUTO-ENTMCNC: 31.3 G/DL (ref 31.5–36.5)
MCV RBC AUTO: 86 FL (ref 78–100)
PLATELET # BLD AUTO: 67 10E3/UL (ref 150–450)
POTASSIUM BLD-SCNC: 4.2 MMOL/L (ref 3.4–5.3)
RBC # BLD AUTO: 4.36 10E6/UL (ref 3.8–5.2)
SODIUM SERPL-SCNC: 134 MMOL/L (ref 133–144)
WBC # BLD AUTO: 7.2 10E3/UL (ref 4–11)

## 2021-08-25 PROCEDURE — 36415 COLL VENOUS BLD VENIPUNCTURE: CPT | Performed by: PATHOLOGY

## 2021-08-25 PROCEDURE — 74174 CTA ABD&PLVS W/CONTRAST: CPT | Mod: GC | Performed by: RADIOLOGY

## 2021-08-25 PROCEDURE — 85027 COMPLETE CBC AUTOMATED: CPT | Performed by: PATHOLOGY

## 2021-08-25 PROCEDURE — 99214 OFFICE O/P EST MOD 30 MIN: CPT | Performed by: NURSE PRACTITIONER

## 2021-08-25 PROCEDURE — 71275 CT ANGIOGRAPHY CHEST: CPT | Mod: GC | Performed by: RADIOLOGY

## 2021-08-25 PROCEDURE — 80048 BASIC METABOLIC PNL TOTAL CA: CPT | Performed by: PATHOLOGY

## 2021-08-25 RX ORDER — IOPAMIDOL 755 MG/ML
100 INJECTION, SOLUTION INTRAVASCULAR ONCE
Status: COMPLETED | OUTPATIENT
Start: 2021-08-25 | End: 2021-08-25

## 2021-08-25 RX ADMIN — IOPAMIDOL 100 ML: 755 INJECTION, SOLUTION INTRAVASCULAR at 16:20

## 2021-08-25 ASSESSMENT — MIFFLIN-ST. JEOR: SCORE: 886.95

## 2021-08-25 ASSESSMENT — PAIN SCALES - GENERAL: PAINLEVEL: NO PAIN (0)
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
## 2021-08-25 NOTE — PATIENT INSTRUCTIONS
Take your medicines every day, as directed    Changes made today:  o HOLD torsemide  o HOLD lisinopril     Monitor Your Weight and Symptoms    Contact us if you:      Gain 2 pounds in one day or 5 pounds in one week    Feel more short of breath    Notice more leg swelling    Feel lightheadeded   Change your lifestyle    Limit Salt or Sodium:    2000 mg  Limit Fluids:    2000 mL or approximately 64 ounces  Eat a Heart Healthy Diet    Low in saturated fats  Stay Active:    Aim to move at least 150 minutes every  week         To Contact us    During Business Hours:  [phone], option # 1 (University)  Then option # 4 (medical questions)     After hours, weekends or holidays:   [phone], Option #4  Ask to speak to the On-Call Cardiologist. Inform them you are a CORE/heart failure patient at the [facility].     Use [employer] allows you to communicate directly with your heart team through secure messaging.    [employer] can be accessed any time on your phone, computer, or tablet.    If you need assistance, we'd be happy to help!         Keep your Heart Appointments:    I will call you on Friday to see how you are doing.

## 2021-08-26 DIAGNOSIS — D69.6 THROMBOCYTOPENIA (H): Primary | ICD-10-CM

## 2021-08-26 LAB — RADIOLOGIST FLAGS: ABNORMAL
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
## 2021-08-27 ENCOUNTER — PATIENT OUTREACH (OUTPATIENT)
Dept: CARDIOLOGY | Facility: CLINIC | Age: 71
End: 2021-08-27

## 2021-08-27 NOTE — TELEPHONE ENCOUNTER
Per Marguerite Muñoz, NP - no changes at this time. Plan to check in Monday and discuss restarting lisinopril at that time.    Called Maribel back. No answer. Left voicemail letting her know to continue with same regimen and that I will call her on Monday. Asked her to call us if she has questions in the interim.

## 2021-08-27 NOTE — TELEPHONE ENCOUNTER
Called  Maribel to check in. She states she is doing okay. Reports her dizziness is getting better. States she noticed it some this morning but that yesterday was 'pretty good.' She feels it is improving but not all the way gone yet. Will update provider and call her back with plan.

## 2021-08-27 NOTE — TELEPHONE ENCOUNTER
S-(situation): plavix unlikely cause of dizziness. Saw Marguerite Muñoz on Wednesday and she held Maribel's lisinopril and torsemide. She states that she is feeling less dizzy off the medications.     B-(background): 70 yr old female patient presents [facility], where she is followed for ICM with EF 30-35%.. She was last seen by Dr. Rosas on 7/14/21. Today she is accompanied by her . She is concerned about her dizziness. She states that ever since she started on Plavix, she has felt dizzy. She describes her dizziness as feeling like she is going to pass out. She otherwise does not have any CV sx. She denies SOB, chest pain, palpitations, LE edema. Her wt is stable .   She denies syncope, or falling due to dizziness. She is accompanied today by her  who is concerned about her dizziness.    A-(assessment): lightheaded    R-(recommendations): Will call Monday to see if patient is doing well. Platelet count on Monday.   Plan : Dizziness: suspect due to Low BP. Will hold torsemide/lisinopril - [facility] will call pt in 2 days to assess if sx improved off such. If pt feels better, would restart lisinopril at 2.5mg daily (currently on 10mg daily). Will hold torsemide until wt starts to increase, or pt has other sx of fluid retention.

## 2021-08-30 ENCOUNTER — LAB (OUTPATIENT)
Dept: LAB | Facility: CLINIC | Age: 71
End: 2021-08-30
Payer: COMMERCIAL

## 2021-08-30 ENCOUNTER — PATIENT OUTREACH (OUTPATIENT)
Dept: CARDIOLOGY | Facility: CLINIC | Age: 71
End: 2021-08-30

## 2021-08-30 DIAGNOSIS — I50.9 CONGESTIVE HEART FAILURE, UNSPECIFIED HF CHRONICITY, UNSPECIFIED HEART FAILURE TYPE (H): Primary | ICD-10-CM

## 2021-08-30 DIAGNOSIS — D69.6 THROMBOCYTOPENIA (H): ICD-10-CM

## 2021-08-30 LAB — PLATELET # BLD AUTO: 282 10E3/UL (ref 150–450)

## 2021-08-30 PROCEDURE — 36415 COLL VENOUS BLD VENIPUNCTURE: CPT | Performed by: PATHOLOGY

## 2021-08-30 PROCEDURE — 85049 AUTOMATED PLATELET COUNT: CPT | Performed by: PATHOLOGY

## 2021-08-30 RX ORDER — LISINOPRIL 2.5 MG/1
2.5 TABLET ORAL DAILY
Qty: 90 TABLET | Refills: 1 | Status: SHIPPED | OUTPATIENT
Start: 2021-08-30 | End: 2021-09-29

## 2021-08-30 NOTE — TELEPHONE ENCOUNTER
Called Maribel to check in after holding meds through the weekend. Left voicemail asking for call or mychart back with updates on symptoms and weights.

## 2021-08-30 NOTE — TELEPHONE ENCOUNTER
Called Maribel to check in. She states she is feeling much better. She states she is not dizzy anymore. SHe was able to get a lot done today. She states weight has increased a little bit but no swelling and no bloating and she denies fluid symptoms. BP today 122/79.  Date: 8/30/2021    Time of Call: 4:20 PM     Diagnosis:  Heart failure     [ VORB ] Ordering provider: Marguerite Muñoz NP    Order: restart lisinopril 2.5mg once daily     Order received by: Carolee Gar RN     Follow-up/additional notes: order placed. Discussed with Maribel via phone. She states understanding. No additional questions. Will plan to check in with her later this week after restarting lisinopril.

## 2021-09-03 ENCOUNTER — PATIENT OUTREACH (OUTPATIENT)
Dept: CARDIOLOGY | Facility: CLINIC | Age: 71
End: 2021-09-03

## 2021-09-03 NOTE — TELEPHONE ENCOUNTER
Called marshal to check in after restarting lisinopril. Left voicemail, asking for call back or mychart.

## 2021-09-04 ENCOUNTER — HEALTH MAINTENANCE LETTER (OUTPATIENT)
Age: 71
End: 2021-09-04

## 2021-09-07 ENCOUNTER — PATIENT OUTREACH (OUTPATIENT)
Dept: CARDIOLOGY | Facility: CLINIC | Age: 71
End: 2021-09-07

## 2021-09-07 NOTE — TELEPHONE ENCOUNTER
Maribel returned call. She states she has had no issues with restarting the lisinopril. She was even washing her cupboards today and didn't notice any dizziness. She does endorse some SOB over the last couple of days but states it is not too bad. She states wieght has gone up 'little by little' but has not increased 2lb in 1 day of 5lb in a week so she is attributing this to dietary weight. She states she has no swelling or bloating to report at this time.

## 2021-09-07 NOTE — TELEPHONE ENCOUNTER
Called Maribel to check in and see if she was able to start lisinopril again and if so, how she is feeling. Left voicemail, asked for call or mychart back.

## 2021-09-10 ENCOUNTER — TELEPHONE (OUTPATIENT)
Dept: FAMILY MEDICINE | Facility: CLINIC | Age: 71
End: 2021-09-10

## 2021-09-10 NOTE — TELEPHONE ENCOUNTER
Padilla calling - pt talking in the background  Patient has been having diarrhea and vomiting   Diarrhea started this AM - now gone   Stomach still feels terrible though and she can't eat   Very weak too -   Has had a half a bottle of water   Vomited just once today   Has low grade temp - 99.7, this AM was 96/97  Denies SOB and coughing   Hasn't left the house recently     Had appt for loose stools back in July 2021    States this is new though and only one day  Told  to have pt push fluids today   Try to eat something   If symptoms continue then appt or ER depending on severity     and pt agree with plan  Dannielle DSOUZA RN

## 2021-09-23 ENCOUNTER — TELEPHONE (OUTPATIENT)
Dept: INFECTIOUS DISEASES | Facility: CLINIC | Age: 71
End: 2021-09-23

## 2021-09-23 DIAGNOSIS — I50.9 CONGESTIVE HEART FAILURE, UNSPECIFIED HF CHRONICITY, UNSPECIFIED HEART FAILURE TYPE (H): Primary | ICD-10-CM

## 2021-09-23 NOTE — TELEPHONE ENCOUNTER
[facility] Call Center    Phone Message    May a detailed message be left on voicemail: yes     Reason for Call: Other: [employer] from [employer] is calling regarding mutual patients orders, Please call to discuss further.  at [phone] ext [phone].    Action Taken: Message routed to:  [facility] ([facility]): ID

## 2021-09-29 ENCOUNTER — OFFICE VISIT (OUTPATIENT)
Dept: CARDIOLOGY | Facility: CLINIC | Age: 71
End: 2021-09-29
Attending: NURSE PRACTITIONER
Payer: COMMERCIAL

## 2021-09-29 ENCOUNTER — LAB (OUTPATIENT)
Dept: LAB | Facility: CLINIC | Age: 71
End: 2021-09-29
Attending: NURSE PRACTITIONER
Payer: COMMERCIAL

## 2021-09-29 VITALS
WEIGHT: 90 LBS | SYSTOLIC BLOOD PRESSURE: 148 MMHG | OXYGEN SATURATION: 97 % | HEIGHT: 61 IN | HEART RATE: 74 BPM | DIASTOLIC BLOOD PRESSURE: 86 MMHG | BODY MASS INDEX: 16.99 KG/M2

## 2021-09-29 DIAGNOSIS — R06.02 SOB (SHORTNESS OF BREATH): ICD-10-CM

## 2021-09-29 DIAGNOSIS — I50.9 CONGESTIVE HEART FAILURE, UNSPECIFIED HF CHRONICITY, UNSPECIFIED HEART FAILURE TYPE (H): ICD-10-CM

## 2021-09-29 DIAGNOSIS — I25.10 CORONARY ARTERY DISEASE INVOLVING NATIVE HEART WITHOUT ANGINA PECTORIS, UNSPECIFIED VESSEL OR LESION TYPE: ICD-10-CM

## 2021-09-29 LAB
ANION GAP SERPL CALCULATED.3IONS-SCNC: 7 MMOL/L (ref 3–14)
BUN SERPL-MCNC: 27 MG/DL (ref 7–30)
CALCIUM SERPL-MCNC: 9.3 MG/DL (ref 8.5–10.1)
CHLORIDE BLD-SCNC: 105 MMOL/L (ref 94–109)
CO2 SERPL-SCNC: 25 MMOL/L (ref 20–32)
CREAT SERPL-MCNC: 1.45 MG/DL (ref 0.52–1.04)
GFR SERPL CREATININE-BSD FRML MDRD: 37 ML/MIN/1.73M2
GLUCOSE BLD-MCNC: 160 MG/DL (ref 70–99)
POTASSIUM BLD-SCNC: 4.3 MMOL/L (ref 3.4–5.3)
SODIUM SERPL-SCNC: 137 MMOL/L (ref 133–144)

## 2021-09-29 PROCEDURE — G0463 HOSPITAL OUTPT CLINIC VISIT: HCPCS

## 2021-09-29 PROCEDURE — 80048 BASIC METABOLIC PNL TOTAL CA: CPT | Performed by: PATHOLOGY

## 2021-09-29 PROCEDURE — 36415 COLL VENOUS BLD VENIPUNCTURE: CPT | Performed by: PATHOLOGY

## 2021-09-29 PROCEDURE — 99214 OFFICE O/P EST MOD 30 MIN: CPT | Performed by: NURSE PRACTITIONER

## 2021-09-29 RX ORDER — TORSEMIDE 10 MG/1
TABLET ORAL
Qty: 90 TABLET | Refills: 1 | Status: SHIPPED | OUTPATIENT
Start: 2021-09-29 | End: 2021-10-04

## 2021-09-29 RX ORDER — LISINOPRIL 2.5 MG/1
5 TABLET ORAL DAILY
Qty: 90 TABLET | Refills: 1 | Status: SHIPPED | OUTPATIENT
Start: 2021-09-29 | End: 2021-10-04

## 2021-09-29 ASSESSMENT — ENCOUNTER SYMPTOMS
CHILLS: 0
HALLUCINATIONS: 0
ABDOMINAL PAIN: 1
BLOATING: 1
VOMITING: 0
BOWEL INCONTINENCE: 1
HYPERTENSION: 0
EXERCISE INTOLERANCE: 0
SWOLLEN GLANDS: 0
WEIGHT GAIN: 0
NIGHT SWEATS: 0
LEG PAIN: 0
FEVER: 0
WEIGHT LOSS: 0
CONSTIPATION: 0
BRUISES/BLEEDS EASILY: 1
BLOOD IN STOOL: 0
NAIL CHANGES: 0
POLYPHAGIA: 0
HEARTBURN: 0
RECTAL PAIN: 0
ALTERED TEMPERATURE REGULATION: 0
SYNCOPE: 0
FATIGUE: 1
SKIN CHANGES: 1
POOR WOUND HEALING: 0
SLEEP DISTURBANCES DUE TO BREATHING: 1
NAUSEA: 0
LIGHT-HEADEDNESS: 0
DECREASED APPETITE: 0
INCREASED ENERGY: 0
DIARRHEA: 1
PALPITATIONS: 0
JAUNDICE: 0
HYPOTENSION: 0
ORTHOPNEA: 1

## 2021-09-29 ASSESSMENT — PAIN SCALES - GENERAL: PAINLEVEL: NO PAIN (0)

## 2021-09-29 ASSESSMENT — MIFFLIN-ST. JEOR: SCORE: 877.87

## 2021-09-29 NOTE — TELEPHONE ENCOUNTER
Sent requested paperwork to Dr. Flores to review and sign. I can fax back once signature obtained.
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
## 2021-09-29 NOTE — PATIENT INSTRUCTIONS
Take your medicines every day, as directed    Changes made today:  o INCREASE lisinopril to 5mg once daily.  o Take torsemide as needed   Monitor Your Weight and Symptoms    Contact us if you:      Gain 2 pounds in one day or 5 pounds in one week    Feel more short of breath    Notice more leg swelling    Feel lightheadeded   Change your lifestyle    Limit Salt or Sodium:    2000 mg  Limit Fluids:    2000 mL or approximately 64 ounces  Eat a Heart Healthy Diet    Low in saturated fats  Stay Active:    Aim to move at least 150 minutes every  week         To Contact us    During Business Hours:  [phone], option # 1 (University)  Then option # 4 (medical questions)     After hours, weekends or holidays:   [phone], Option #4  Ask to speak to the On-Call Cardiologist. Inform them you are a CORE/heart failure patient at the [facility].     Use [employer] allows you to communicate directly with your heart team through secure messaging.    [facility] can be accessed any time on your phone, computer, or tablet.    If you need assistance, we'd be happy to help!         Keep your Heart Appointments:    Follow up with [facility] clinic in 1 month     Results for ERASMO MERINO (MRN [id number]) as of 9/29/2021 16:24   Ref. Range 9/29/2021 15:36   Sodium Latest Ref Range: 133 - 144 mmol/L 137   Potassium Latest Ref Range: 3.4 - 5.3 mmol/L 4.3   Chloride Latest Ref Range: 94 - 109 mmol/L 105   Carbon Dioxide Latest Ref Range: 20 - 32 mmol/L 25   Urea Nitrogen Latest Ref Range: 7 - 30 mg/dL 27   Creatinine Latest Ref Range: 0.52 - 1.04 mg/dL 1.45 (H)   GFR Estimate Latest Ref Range: >60 mL/min/1.73m2 37 (L)   Calcium Latest Ref Range: 8.5 - 10.1 mg/dL 9.3   Anion Gap Latest Ref Range: 3 - 14 mmol/L 7   Glucose Latest Ref Range: 70 - 99 mg/dL 160 (H)

## 2021-09-29 NOTE — NURSING NOTE
Chief Complaint   Patient presents with     Follow Up     70 year old female presents with systolic heart failure, ef 35-40% for follow up with labs prior      Vitals were taken and medications reconciled.    Torey Coronado, EMT  3:54 PM
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
## 2021-09-29 NOTE — TELEPHONE ENCOUNTER
[facility] Call Center    Phone Message    May a detailed message be left on voicemail: yes     Reason for Call:  Other: Екатерина from [facility] is requesting orders to be signed and dated by Dr. Flores for PICC Line Removal from back in June.  They cannot bill until order is signed off by physician.  Order was sent via fax to clinic multiple times, per Екатерина.  Please fax back to: [phone]      Call with questions to: [phone] ext [phone]    Action Taken: Message routed to:  [facility] ([facility]): ID    Travel Screening: Not Applicable
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
## 2021-09-29 NOTE — NURSING NOTE
Diet: Patient instructed regarding a heart failure healthy diet, including discussion of reduced fat and 2000 mg daily sodium restriction, daily weights, medication purpose and compliance, fluid restrictions and resources for patient and family to access for assistance with heart failure management.       Labs: Patient was given results of the laboratory testing obtained today and patient was instructed about when to return for the next laboratory testing.     Med Reconcile: Reviewed and verified all current medications with the patient. The updated medication list was printed and given to the patient. INCREASE lisinopril to 5mg daily. CHANGE torsemide to PRN.    Return Appointment: Patient given instructions regarding scheduling next clinic visit. RTC for CORE in 1 month.    Patient stated she understood all health information given and agreed to call with further questions or concerns.     Carolee Gar RN

## 2021-09-30 ENCOUNTER — TELEPHONE (OUTPATIENT)
Dept: GERIATRICS | Facility: CLINIC | Age: 71
End: 2021-09-30

## 2021-09-30 DIAGNOSIS — Z94.0 KIDNEY REPLACED BY TRANSPLANT: ICD-10-CM

## 2021-09-30 DIAGNOSIS — Z94.0 KIDNEY TRANSPLANTED: Primary | ICD-10-CM

## 2021-09-30 DIAGNOSIS — Z48.298 AFTERCARE FOLLOWING ORGAN TRANSPLANT: ICD-10-CM

## 2021-09-30 DIAGNOSIS — Z94.0 IMMUNOSUPPRESSIVE MANAGEMENT ENCOUNTER FOLLOWING KIDNEY TRANSPLANT: ICD-10-CM

## 2021-09-30 DIAGNOSIS — Z79.899 IMMUNOSUPPRESSIVE MANAGEMENT ENCOUNTER FOLLOWING KIDNEY TRANSPLANT: ICD-10-CM

## 2021-09-30 RX ORDER — MYCOPHENOLIC ACID 360 MG/1
360 TABLET, DELAYED RELEASE ORAL 2 TIMES DAILY
Qty: 60 TABLET | Refills: 11 | Status: SHIPPED | OUTPATIENT
Start: 2021-09-30 | End: 2022-08-04 | Stop reason: ALTCHOICE

## 2021-09-30 NOTE — TELEPHONE ENCOUNTER
Rx for MPA sent.    LPN task:  Call and invite back for Annual appointment with Transplant Nephrology (Virtual Visit).  Send a message to Scheduling pool to set-up visit.  Please send a letter if unable to discuss on the phone.

## 2021-09-30 NOTE — LETTER
Maribel Yang  [address]  [place] [address]                September 30, 2021    This letter is regarding your medication refills.    For the best outcome for your transplant and in order for us to refill your prescriptions, we encourage you to have a yearly clinic appointment with a physician and to have current labs. If you are unable to return to our transplant center there are several alternatives. Please call your coordinator to discuss them. .  To make an appointment with a physician here at [facility], please call:  The Transplant Office at [phone] or [phone].  .      The Transplant Staff at [facility]

## 2021-10-14 ENCOUNTER — MYC MEDICAL ADVICE (OUTPATIENT)
Dept: FAMILY MEDICINE | Facility: CLINIC | Age: 71
End: 2021-10-14

## 2021-10-14 DIAGNOSIS — L02.92 BOIL: Primary | ICD-10-CM

## 2021-10-15 RX ORDER — MUPIROCIN 20 MG/G
OINTMENT TOPICAL 3 TIMES DAILY
Qty: 30 G | Refills: 0 | Status: SHIPPED | OUTPATIENT
Start: 2021-10-15 | End: 2022-05-03

## 2021-10-15 NOTE — TELEPHONE ENCOUNTER
She can start here -   Does any one have available appt on Monday? Or I can add her to my schedule on Tuesday.  She may need to go to [facility] over the weekend if it is getting worse.  Thanks  PN

## 2021-10-15 NOTE — TELEPHONE ENCOUNTER
[facility] calling again regarding mutual patient and would like a call back asap. Been waiting since June for Signatures. Please call [phone] ext [phone]

## 2021-10-19 ENCOUNTER — ANCILLARY PROCEDURE (OUTPATIENT)
Dept: MAMMOGRAPHY | Facility: CLINIC | Age: 71
End: 2021-10-19
Attending: FAMILY MEDICINE
Payer: COMMERCIAL

## 2021-10-19 DIAGNOSIS — Z12.31 VISIT FOR SCREENING MAMMOGRAM: ICD-10-CM

## 2021-10-19 PROCEDURE — 77067 SCR MAMMO BI INCL CAD: CPT

## 2021-10-22 DIAGNOSIS — I50.9 CONGESTIVE HEART FAILURE, UNSPECIFIED HF CHRONICITY, UNSPECIFIED HEART FAILURE TYPE (H): Primary | ICD-10-CM

## 2021-10-25 ENCOUNTER — TELEPHONE (OUTPATIENT)
Dept: TRANSPLANT | Facility: CLINIC | Age: 71
End: 2021-10-25

## 2021-10-25 NOTE — TELEPHONE ENCOUNTER
October 25, 2021 8:48 AM - Gerardo Santos CNA: pt called to [facility] christina masont

## 2021-10-26 ENCOUNTER — TELEPHONE (OUTPATIENT)
Dept: FAMILY MEDICINE | Facility: CLINIC | Age: 71
End: 2021-10-26

## 2021-10-27 ENCOUNTER — OFFICE VISIT (OUTPATIENT)
Dept: CARDIOLOGY | Facility: CLINIC | Age: 71
End: 2021-10-27
Attending: NURSE PRACTITIONER
Payer: COMMERCIAL

## 2021-10-27 ENCOUNTER — LAB (OUTPATIENT)
Dept: LAB | Facility: CLINIC | Age: 71
End: 2021-10-27
Attending: NURSE PRACTITIONER
Payer: COMMERCIAL

## 2021-10-27 VITALS
BODY MASS INDEX: 16.69 KG/M2 | WEIGHT: 88.4 LBS | SYSTOLIC BLOOD PRESSURE: 195 MMHG | OXYGEN SATURATION: 98 % | HEART RATE: 60 BPM | DIASTOLIC BLOOD PRESSURE: 101 MMHG | HEIGHT: 61 IN

## 2021-10-27 DIAGNOSIS — I10 ESSENTIAL HYPERTENSION: Primary | ICD-10-CM

## 2021-10-27 DIAGNOSIS — L02.214 ABSCESS OF GROIN: ICD-10-CM

## 2021-10-27 DIAGNOSIS — I50.9 CONGESTIVE HEART FAILURE, UNSPECIFIED HF CHRONICITY, UNSPECIFIED HEART FAILURE TYPE (H): ICD-10-CM

## 2021-10-27 DIAGNOSIS — B95.61 BACTEREMIA DUE TO METHICILLIN SUSCEPTIBLE STAPHYLOCOCCUS AUREUS (MSSA): ICD-10-CM

## 2021-10-27 DIAGNOSIS — R78.81 BACTEREMIA DUE TO METHICILLIN SUSCEPTIBLE STAPHYLOCOCCUS AUREUS (MSSA): ICD-10-CM

## 2021-10-27 LAB
ANION GAP SERPL CALCULATED.3IONS-SCNC: 3 MMOL/L (ref 3–14)
BUN SERPL-MCNC: 34 MG/DL (ref 7–30)
CALCIUM SERPL-MCNC: 9.5 MG/DL (ref 8.5–10.1)
CHLORIDE BLD-SCNC: 106 MMOL/L (ref 94–109)
CO2 SERPL-SCNC: 28 MMOL/L (ref 20–32)
CREAT SERPL-MCNC: 1.38 MG/DL (ref 0.52–1.04)
GFR SERPL CREATININE-BSD FRML MDRD: 39 ML/MIN/1.73M2
GLUCOSE BLD-MCNC: 101 MG/DL (ref 70–99)
POTASSIUM BLD-SCNC: 4.5 MMOL/L (ref 3.4–5.3)
SODIUM SERPL-SCNC: 137 MMOL/L (ref 133–144)

## 2021-10-27 PROCEDURE — 80048 BASIC METABOLIC PNL TOTAL CA: CPT | Performed by: PATHOLOGY

## 2021-10-27 PROCEDURE — 87040 BLOOD CULTURE FOR BACTERIA: CPT | Mod: 90 | Performed by: PATHOLOGY

## 2021-10-27 PROCEDURE — 99000 SPECIMEN HANDLING OFFICE-LAB: CPT | Performed by: PATHOLOGY

## 2021-10-27 PROCEDURE — 36415 COLL VENOUS BLD VENIPUNCTURE: CPT | Performed by: PATHOLOGY

## 2021-10-27 PROCEDURE — G0463 HOSPITAL OUTPT CLINIC VISIT: HCPCS

## 2021-10-27 PROCEDURE — 99214 OFFICE O/P EST MOD 30 MIN: CPT | Performed by: NURSE PRACTITIONER

## 2021-10-27 RX ORDER — HYDRALAZINE HYDROCHLORIDE 10 MG/1
10 TABLET, FILM COATED ORAL 3 TIMES DAILY
Qty: 90 TABLET | Refills: 3 | Status: SHIPPED | OUTPATIENT
Start: 2021-10-27 | End: 2021-10-27

## 2021-10-27 RX ORDER — HYDRALAZINE HYDROCHLORIDE 10 MG/1
10 TABLET, FILM COATED ORAL 3 TIMES DAILY
Qty: 270 TABLET | Refills: 1 | Status: SHIPPED | OUTPATIENT
Start: 2021-10-27 | End: 2022-06-03

## 2021-10-27 ASSESSMENT — MIFFLIN-ST. JEOR: SCORE: 873.1

## 2021-10-27 ASSESSMENT — PAIN SCALES - GENERAL: PAINLEVEL: NO PAIN (0)

## 2021-10-27 NOTE — PATIENT INSTRUCTIONS
Take your medicines every day, as directed    Changes made today:  o START hydralazine 10mg three times daily.  o Make sure you get a dose in tonight.  o I will call you tomorrow mid day to see how you are doing     Monitor Your Weight and Symptoms    Contact us if you:      Gain 2 pounds in one day or 5 pounds in one week    Feel more short of breath    Notice more leg swelling    Feel lightheadeded   Change your lifestyle    Limit Salt or Sodium:    2000 mg  Limit Fluids:    2000 mL or approximately 64 ounces  Eat a Heart Healthy Diet    Low in saturated fats  Stay Active:    Aim to move at least 150 minutes every  week         To Contact us    During Business Hours:  [phone], option # 1 (University)  Then option # 4 (medical questions)     After hours, weekends or holidays:   [phone], Option #4  Ask to speak to the On-Call Cardiologist. Inform them you are a CORE/heart failure patient at the [facility].     Use [facility] allows you to communicate directly with your heart team through secure messaging.    [facility] can be accessed any time on your phone, computer, or tablet.    If you need assistance, we'd be happy to help!         Keep your Heart Appointments:    Follow up pending control of your blood pressure
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
## 2021-10-27 NOTE — NURSING NOTE
Chief Complaint   Patient presents with     Follow Up     RTN [facility]: 70 year old female presents with systolic heart failure, ef 35-40% for follow up with labs prior      Vitals were taken and medications reconciled.    Torey Coronado, EMT  5:16 PM

## 2021-10-28 ENCOUNTER — PATIENT OUTREACH (OUTPATIENT)
Dept: CARDIOLOGY | Facility: CLINIC | Age: 71
End: 2021-10-28

## 2021-10-28 NOTE — TELEPHONE ENCOUNTER
Called Maribel to check in. She states she is feeling okay. She is feeling a little more short of breath today but she is still able to do what she needs to do. She is up 1lb today from yesterday. She takes torsemide every other day and today is NOT a torsemide day so she attributes feeling more SOB to this.  BP at 1130am this morning was 141/86, which is improving from yesterday.  Maribel states no additional concerns at this time. I will call her tomorrow to check on her again and see if breathing improved and check on her BP. She states understanding of plan.

## 2021-10-29 ENCOUNTER — TELEPHONE (OUTPATIENT)
Dept: VASCULAR SURGERY | Facility: CLINIC | Age: 71
End: 2021-10-29

## 2021-10-29 NOTE — TELEPHONE ENCOUNTER
Returned call and requested orders be faxed to our office for signature.    CHERRI PereaN, RN  [facility]  Ph: [phone]  Fax: [phone]

## 2021-10-29 NOTE — TELEPHONE ENCOUNTER
Health Call Center    Phone Message    May a detailed message be left on voicemail: yes     Reason for Call: Other: Екатерина from [facility] has been waiting on signed order from Dr. Ferrara since June. Forms are for Physicians orders for caring for wounds. Please fax orders to [phone] and/or call back with questions at [phone]. Thank you.     Action Taken: Message routed to:  Other: Vasc Surg    Travel Screening: Not Applicable

## 2021-11-01 LAB — BACTERIA BLD CULT: NO GROWTH

## 2021-11-01 NOTE — TELEPHONE ENCOUNTER
[facility] Call Center    Phone Message    May a detailed message be left on voicemail: yes     Reason for Call: Other: Екатерина called regarding below very frustrated. I was unable to connect her with the care team. They have not heard back from anyone regarding this. They have tried to fax this multiple times. They need these orders signed ASAP for billing purposes.  They are from June. Please call them back to discuss. Thanks    Action Taken: Message routed to:  [facility] ([facility]    Travel Screening: Not Applicable

## 2021-11-08 ENCOUNTER — MYC MEDICAL ADVICE (OUTPATIENT)
Dept: FAMILY MEDICINE | Facility: CLINIC | Age: 71
End: 2021-11-08
Payer: COMMERCIAL
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
## 2021-11-22 DIAGNOSIS — I50.9 CONGESTIVE HEART FAILURE, UNSPECIFIED HF CHRONICITY, UNSPECIFIED HEART FAILURE TYPE (H): ICD-10-CM

## 2021-11-22 DIAGNOSIS — I10 ESSENTIAL HYPERTENSION: ICD-10-CM

## 2021-12-14 ENCOUNTER — MYC MEDICAL ADVICE (OUTPATIENT)
Dept: FAMILY MEDICINE | Facility: CLINIC | Age: 71
End: 2021-12-14
Payer: COMMERCIAL

## 2021-12-14 DIAGNOSIS — K43.9 VENTRAL HERNIA WITHOUT OBSTRUCTION OR GANGRENE: Primary | ICD-10-CM

## 2021-12-15 NOTE — TELEPHONE ENCOUNTER
PN  Please see mychart message  Did you want to see patient for this or referral?    Thank you,  Aura Stiles RN  [place]

## 2021-12-16 NOTE — TELEPHONE ENCOUNTER
REFERRAL INFORMATION:    Referring Provider:  Dr. Lisa Martinez     Referring Clinic:   [facility]     Reason for Visit/Diagnosis: Ventral hernia        FUTURE VISIT INFORMATION:    Appointment Date: 12/21/2021    Appointment Time: 11:30 AM      NOTES RECORD STATUS  DETAILS   OFFICE NOTE from Referring Provider Internal  12/14/2021 MyC Medical Advice   OFFICE NOTE from Other Specialists N/A    HOSPITAL DISCHARGE SUMMARY/ ED VISITS  N/A    OPERATIVE REPORT N/A    ENDOSCOPY (EGD)  N/A    PERTINENT LABS Internal    PATHOLOGY REPORTS (RELATED) N/A    IMAGING (CT, MRI, US, XR)  Internal CT Abdomen Pelvis: 4/23/2021

## 2021-12-20 ENCOUNTER — PATIENT OUTREACH (OUTPATIENT)
Dept: SURGERY | Facility: CLINIC | Age: 71
End: 2021-12-20
Payer: COMMERCIAL

## 2021-12-20 NOTE — PROGRESS NOTES
Patient Telephone Reminder Call    Date of call:  12/20/21  Phone numbers:  Cell number on file:    Telephone Information:   Mobile [phone]       Reached patient/confirmed appointment:  No - left message:   on voicemail  Appointment with:   Dr. Thomas Schroeder  Reason for visit:  Hernia consult

## 2021-12-21 ENCOUNTER — PRE VISIT (OUTPATIENT)
Dept: SURGERY | Facility: CLINIC | Age: 71
End: 2021-12-21

## 2021-12-22 ENCOUNTER — PATIENT OUTREACH (OUTPATIENT)
Dept: SURGERY | Facility: CLINIC | Age: 71
End: 2021-12-22
Payer: COMMERCIAL

## 2021-12-22 NOTE — PROGRESS NOTES
Patient Telephone Reminder Call    Date of call:  12/22/21  Phone numbers:  Cell number on file:    Telephone Information:   Mobile [phone]       Reached patient/confirmed appointment:  Yes  Appointment with:   Dr. Thomas Schroeder  Reason for visit: hernia consult

## 2021-12-23 ENCOUNTER — OFFICE VISIT (OUTPATIENT)
Dept: SURGERY | Facility: CLINIC | Age: 71
End: 2021-12-23
Attending: FAMILY MEDICINE
Payer: COMMERCIAL

## 2021-12-23 VITALS
DIASTOLIC BLOOD PRESSURE: 80 MMHG | WEIGHT: 91.6 LBS | HEART RATE: 67 BPM | SYSTOLIC BLOOD PRESSURE: 123 MMHG | BODY MASS INDEX: 17.29 KG/M2 | HEIGHT: 61 IN | OXYGEN SATURATION: 98 % | TEMPERATURE: 98.2 F

## 2021-12-23 DIAGNOSIS — Z71.1 FEARED COMPLAINT WITHOUT DIAGNOSIS: Primary | ICD-10-CM

## 2021-12-23 PROCEDURE — 99203 OFFICE O/P NEW LOW 30 MIN: CPT | Performed by: SURGERY

## 2021-12-23 ASSESSMENT — MIFFLIN-ST. JEOR: SCORE: 881.83

## 2021-12-23 ASSESSMENT — PAIN SCALES - GENERAL: PAINLEVEL: MODERATE PAIN (4)

## 2021-12-23 NOTE — PATIENT INSTRUCTIONS
You met with Dr. Thomas Schroeder.      Today's visit instructions:    Return to the Surgery Clinic on an as needed basis.        If you have questions please contact Darlin RN or April RN during regular clinic hours, Monday through Friday 7:30 AM - 4:00 PM, or you can contact us via [facility] at anytime.       If you have urgent needs after-hours, weekends, or holidays please call the hospital at [phone] and ask to speak with our on-call General Surgery Team.    Appointment [phone]  Nurse Advice (Darlin or April): [phone]   Surgery Scheduler (Marcio): [phone]  Fax: [phone]

## 2021-12-23 NOTE — NURSING NOTE
"Chief Complaint   Patient presents with     New Patient     New consult for possible hernia surgery.       Vitals:    12/23/21 1414   BP: 123/80   BP Location: Left arm   Patient Position: Sitting   Cuff Size: Adult Small   Pulse: 67   Temp: 98.2  F (36.8  C)   TempSrc: Oral   SpO2: 98%   Weight: 91 lb 9.6 oz   Height: 5' 1.25\"       Body mass index is 17.17 kg/m .        Chantell Shay CMA    "

## 2021-12-23 NOTE — LETTER
12/23/2021       RE: Maribel Yang  [address]  [facility] [address]     Dear Colleague,    Thank you for referring your patient, Maribel Yang, to the [facility] [place] at [facility]. Please see a copy of my visit note below.    Maribel Yang is a 71 year old female with a multitude of significant medical issues who presents today noting pain at the RLQ over a presumed hernia. Unclear who informed patient on hernia as no imaging shows any defect there. No obstructive symptoms.    Past medical and surgical history, medications, allergies, family history, and social history were reviewed with the patient.  Past Medical History:   Diagnosis Date     Abnormal coagulation profile     p [id number]>A heterozygote      Age-related osteoporosis without current pathological fracture 6/22/2019     Anemia      Antiplatelet or antithrombotic long-term use      ASCUS with positive high risk HPV 2007, 2015    + HPV 56, 54,& 6, colp - TAL III, Leep =TAL II     Basal cell carcinoma      Congestive heart failure, unspecified HF chronicity, unspecified heart failure type (H) 6/22/2021     Depressive disorder July 2015     Hypertension      Immunosuppressed status (H)     due meds     Kidney replaced by transplant 9/04    Living donor recipient,  Rejection 7/2005     LSIL (low grade squamous intraepithelial lesion) on Pap smear 4/2013    +HPV 33 or 45, 61       PAD (peripheral artery disease) (H)      PONV (postoperative nausea and vomiting)      Squamous cell lung cancer (H)      Thrombosis of leg 1967     Unspecified disorder of kidney and ureter     X-linked dominant Alport's syndrome.     Past Surgical History:   Procedure Laterality Date     BIOPSY      Kidney, Lung, Breast     BIOPSY ANAL N/A 03/14/2018    Procedure: BIOPSY ANAL;;  Surgeon: Shabbir Leo MD;  Location: [facility]     BYPASS GRAFT FEMORAL FEMORAL Bilateral  04/25/2021    Procedure: Axplantation infected graft, femoral to femoral bypass with cadaveric artery, bilateral SATORIUS MUSCLE FLAP CREATION, evacuation of absece, vacuum closure;  Surgeon: Raven Lewis MD;  Location: [facility] OR     COLONOSCOPY       COLONOSCOPY N/A 08/09/2017    Procedure: COMBINED COLONOSCOPY, SINGLE OR MULTIPLE BIOPSY/POLYPECTOMY BY BIOPSY;;  Surgeon: Sushil Hyatt MD;  Location: [facility] GI     COLPOSCOPY,LOOP ELECTRD CERVIX EXCIS  03/11/2008    TAL II     CONIZATION LEEP  07/17/2013    Procedure: CONIZATION LEEP;;  Surgeon: Liliana Renteria MD;  [facility]     CONIZATION LEEP N/A 08/17/2016    Procedure: CONIZATION LEEP;  Surgeon: Liliana Renteria MD;  [facility]     CV CORONARY ANGIOGRAM N/A 04/06/2021    Procedure: CV CORONARY ANGIOGRAM;  Surgeon: Sincere Rosas MD;  Location:  [facility] CATH LAB     CV CORONARY ANGIOGRAM N/A 04/25/2021    Procedure: Coronary Angiogram;  Surgeon: Sincere Rosas MD;  Location:  HE[facility]ARD[facility] CATH LAB     CV PCI STENT DRUG ELUTING N/A 04/06/2021    Procedure: Percutaneous Coronary Intervention Stent Drug Eluting;  Surgeon: Sincere Rosas MD;  Location:  HEART CARDIAC CATH LAB     ENDOVASCULAR REPAIR ANEURYSM AORTOILIAC Bilateral 04/06/2021    Procedure: Endovascular Abdominal Aortic Aneurysm Repair with Aortouniiliac Device and Femoral-Femoral Artery Bypass with [id number] Artegraft;  Surgeon: Abena Ferrara MD;  [facility]     EXAM UNDER ANESTHESIA ANUS  07/15/2014    Procedure: EXAM UNDER ANESTHESIA ANUS;  Surgeon: Radha Musa MD;  [facility]     EXAM UNDER ANESTHESIA ANUS N/A 03/14/2018    Procedure: EXAM UNDER ANESTHESIA ANUS;  Anal Exam Under Anesthesia With Excision of anal lesion, proctoscopy;  Surgeon: Shabbir Leo MD;  Location: [facility]     EYE SURGERY       GENITOURINARY SURGERY       IR OR ANGIOGRAM  04/06/2021     IRRIGATION AND DEBRIDEMENT GROIN Right  04/24/2021    Procedure: IRRIGATION AND DEBRIDEMENT, INGUINAL REGION, I & D PF RIGHT GROIN;  Surgeon: Raven Lewis MD;  [facility]     IRRIGATION AND DEBRIDEMENT GROIN N/A 04/26/2021    Procedure: IRRIGATION AND DEBRIDEMENT, INGUINAL REGION, PLACEMENT OF VERAFLO WOUND VAC, WOUND WASHOUT,;  Surgeon: Raven Lewis MD;  [facility]     LASER CO2 EXCISE VULVA WIDE LOCAL  07/15/2014    Procedure: LASER CO2 EXCISE VULVA WIDE LOCAL;  Surgeon: Liliana Renteria MD;  [facility]     LASER CO2 VAGINA  07/17/2013    Procedure: LASER CO2 VAGINA;;  Surgeon: Liliana Renteria MD;  [facility]     LASER CO2 VAGINA N/A 09/25/2018    Procedure: LASER CO2 VAGINA;  Exam Under Anesthesia, CO2 Laser Ablation of Upper Vagina and Cervix;  Surgeon: Pati Garcia MD;  [facility]: [facility]     MICROSCOPY ANAL  07/17/2013    Procedure: MICROSCOPY ANAL;  Anal Microscopy,  EUA vagina,Colposcopy Of Vagina And Vulva, Vaginal Biopsies, Omniguide Co2 Laser To Vagina and vulva, Loop Electrosurgical Excision Procedure To Cervix;  Surgeon: Radha Musa MD;  [facility]     MICROSCOPY ANAL  07/15/2014    Procedure: MICROSCOPY ANAL;  Surgeon: Radha Musa MD;  [facility]     PICC DOUBLE LUMEN PLACEMENT Right 04/30/2021    39cm, Basilic vein     TRANSESOPHAGEAL ECHOCARDIOGRAM INTRAOPERATIVE N/A 04/28/2021    Procedure: ECHOCARDIOGRAM, TRANSESOPHAGEAL, INTRAOPERATIVE;  Surgeon: GENERIC ANESTHESIA PROVIDER;  Location: [facility] OR     VASCULAR SURGERY      Thrombectomy     ZZC NONSPECIFIC PROCEDURE      Thrombectomy     ZZC NONSPECIFIC PROCEDURE  1955 and 1959    Bilater eye surgery - correction for crossed eyes     ZZC NONSPECIFIC PROCEDURE  1998    oopherectomy L     ZZC NONSPECIFIC PROCEDURE  1967    open kidney biopsy - L     [facility] TRANSPLANTATION OF KIDNEY  09/2004    [facility]        ROS: 10 point review of systems negative except noted in HPI  PHYSICAL EXAM  General appearance- frail,  " alert, and in no distress.  Neck- Neck is supple without obvious adenopathy.  Lungs- Respiratory effort unlabored, but shallow.  Gait- uses walker  Abdomen - soft non distended, non tender without obvious masses. Right lower quad with some muscle laxity but no fascial defect appreciated.  CT done Aug of this year is reviewed and I do not appreciate any significant abdominal wall hernias.  Impression: Medically compromised patient with muscle laxity of right abdominal wall without obvious hernia.  Recommend: continued medical cares. Can reassess as needed.  \"Total time = 30 minutes, spent on the date of encounter doing chart review, history and physical, documentation, patient education, and any further activity as noted above.       Thomas Schroeder MD  "

## 2021-12-23 NOTE — PROGRESS NOTES
Maribel Yang is a 71 year old female with a multitude of significant medical issues who presents today noting pain at the RLQ over a presumed hernia. Unclear who informed patient on hernia as no imaging shows any defect there. No obstructive symptoms.    Past medical and surgical history, medications, allergies, family history, and social history were reviewed with the patient.  Past Medical History:   Diagnosis Date     Abnormal coagulation profile     p [id number] heterozygote      Age-related osteoporosis without current pathological fracture 6/22/2019     Anemia      Antiplatelet or antithrombotic long-term use      ASCUS with positive high risk HPV 2007, 2015    + HPV 56, 54,& 6, colp - TAL III, Leep =TAL II     Basal cell carcinoma      Congestive heart failure, unspecified HF chronicity, unspecified heart failure type (H) 6/22/2021     Depressive disorder July 2015     Hypertension      Immunosuppressed status (H)     due meds     Kidney replaced by transplant 9/04    Living donor recipient,  Rejection 7/2005     LSIL (low grade squamous intraepithelial lesion) on Pap smear 4/2013    +HPV 33 or 45, 61       PAD (peripheral artery disease) (H)      PONV (postoperative nausea and vomiting)      Squamous cell lung cancer (H)      Thrombosis of leg 1967     Unspecified disorder of kidney and ureter     X-linked dominant Alport's syndrome.     Past Surgical History:   Procedure Laterality Date     BIOPSY      Kidney, Lung, Breast     BIOPSY ANAL N/A 03/14/2018    Procedure: BIOPSY ANAL;;  Surgeon: Shabbir Leo MD;  [facility]: [facility]     BYPASS GRAFT FEMORAL FEMORAL Bilateral 04/25/2021    Procedure: Axplantation infected graft, femoral to femoral bypass with cadaveric artery, bilateral SATORIUS MUSCLE FLAP CREATION, evacuation of absece, vacuum closure;  Surgeon: Raven Lewis MD;  Location: [facility]     COLONOSCOPY       COLONOSCOPY N/A 08/09/2017    Procedure: COMBINED COLONOSCOPY, SINGLE OR MULTIPLE  BIOPSY/POLYPECTOMY BY BIOPSY;;  Surgeon: Sushil Hyatt MD;  Location:  GI     COLPOSCOPY,LOOP ELECTRD CERVIX EXCIS  03/11/2008    TAL II     CONIZATION LEEP  07/17/2013    Procedure: CONIZATION LEEP;;  Surgeon: Liliana Renteria MD;  [facility]     CONIZATION LEEP N/A 08/17/2016    Procedure: CONIZATION LEEP;  Surgeon: Liliana Renteria MD;  [facility]     CV CORONARY ANGIOGRAM N/A 04/06/2021    Procedure: CV CORONARY ANGIOGRAM;  Surgeon: Sincere Rosas MD;  Location:  [facility]     CV CORONARY ANGIOGRAM N/A 04/25/2021    Procedure: Coronary Angiogram;  Surgeon: Sincere Rosas MD;  Location:  [facility]     CV PCI STENT DRUG ELUTING N/A 04/06/2021    Procedure: Percutaneous Coronary Intervention Stent Drug Eluting;  Surgeon: Sincere Rosas MD;  Location:  [facility]ATH [facility]     ENDOVASCULAR REPAIR ANEURYSM AORTOILIAC Bilateral 04/06/2021    Procedure: Endovascular Abdominal Aortic Aneurysm Repair with Aortouniiliac Device and Femoral-Femoral Artery Bypass with [id number] Artegraft;  Surgeon: Abena Ferrara MD;  [facility]     EXAM UNDER ANESTHESIA ANUS  07/15/2014    Procedure: EXAM UNDER ANESTHESIA ANUS;  Surgeon: Radha Musa MD;  [facility]     EXAM UNDER ANESTHESIA ANUS N/A 03/14/2018    Procedure: EXAM UNDER ANESTHESIA ANUS;  Anal Exam Under Anesthesia With Excision of anal lesion, proctoscopy;  Surgeon: Shabbir Leo MD;  [facility]     EYE SURGERY       GENITOURINARY SURGERY       IR OR ANGIOGRAM  04/06/2021     IRRIGATION AND DEBRIDEMENT GROIN Right 04/24/2021    Procedure: IRRIGATION AND DEBRIDEMENT, INGUINAL REGION, I & D PF RIGHT GROIN;  Surgeon: Raven Lewis MD;  [facility]     IRRIGATION AND DEBRIDEMENT GROIN N/A 04/26/2021    Procedure: IRRIGATION AND DEBRIDEMENT, INGUINAL REGION, PLACEMENT OF VERAFLO WOUND VAC, WOUND WASHOUT,;  Surgeon: Raven Lewis MD;   [facility]     LASER CO2 EXCISE VULVA WIDE LOCAL  07/15/2014    Procedure: LASER CO2 EXCISE VULVA WIDE LOCAL;  Surgeon: Liliana Renteria MD;  [facility]     LASER CO2 VAGINA  07/17/2013    Procedure: LASER CO2 VAGINA;;  Surgeon: Liliana Renteria MD;  [facility]     LASER CO2 VAGINA N/A 09/25/2018    Procedure: LASER CO2 VAGINA;  Exam Under Anesthesia, CO2 Laser Ablation of Upper Vagina and Cervix;  Surgeon: Pati Garcia MD;  [facility]     MICROSCOPY ANAL  07/17/2013    Procedure: MICROSCOPY ANAL;  Anal Microscopy,  EUA vagina,Colposcopy Of Vagina And Vulva, Vaginal Biopsies, Omniguide Co2 Laser To Vagina and vulva, Loop Electrosurgical Excision Procedure To Cervix;  Surgeon: Radha Musa MD;  [facility]     MICROSCOPY ANAL  07/15/2014    Procedure: MICROSCOPY ANAL;  Surgeon: Radha Musa MD;  [facility]     PICC DOUBLE LUMEN PLACEMENT Right 04/30/2021    39cm, Basilic vein     TRANSESOPHAGEAL ECHOCARDIOGRAM INTRAOPERATIVE N/A 04/28/2021    Procedure: ECHOCARDIOGRAM, TRANSESOPHAGEAL, INTRAOPERATIVE;  Surgeon: GENERIC ANESTHESIA PROVIDER;  Location: [facility]     VASCULAR SURGERY      Thrombectomy     [facility] NONSPECIFIC PROCEDURE      Thrombectomy     [facility] NONSPECIFIC PROCEDURE  1955 and 1959    Bilater eye surgery - correction for crossed eyes     [facility] NONSPECIFIC PROCEDURE  1998    oopherectomy L     [facility] NONSPECIFIC PROCEDURE  1967    open kidney biopsy - L     [facility] TRANSPLANTATION OF KIDNEY  09/2004    recipient -- done at [facility]       ROS: 10 point review of systems negative except noted in HPI  PHYSICAL EXAM  General appearance- frail,  alert, and in no distress.  Neck- Neck is supple without obvious adenopathy.  Lungs- Respiratory effort unlabored, but shallow.  Gait- uses walker  Abdomen - soft non distended, non tender without obvious masses. Right lower quad with some muscle laxity but no fascial defect appreciated.  CT done Aug of this year is  "reviewed and I do not appreciate any significant abdominal wall hernias.  Impression: Medically compromised patient with muscle laxity of right abdominal wall without obvious hernia.  Recommend: continued medical cares. Can reassess as needed.  \"Total time = 30 minutes, spent on the date of encounter doing chart review, history and physical, documentation, patient education, and any further activity as noted above.               "

## 2021-12-27 DIAGNOSIS — I50.9 CONGESTIVE HEART FAILURE, UNSPECIFIED HF CHRONICITY, UNSPECIFIED HEART FAILURE TYPE (H): Primary | ICD-10-CM

## 2021-12-28 ENCOUNTER — VIRTUAL VISIT (OUTPATIENT)
Dept: NEPHROLOGY | Facility: CLINIC | Age: 71
End: 2021-12-28
Attending: INTERNAL MEDICINE
Payer: COMMERCIAL

## 2021-12-28 VITALS — WEIGHT: 91 LBS | BODY MASS INDEX: 16.75 KG/M2 | HEIGHT: 62 IN

## 2021-12-28 DIAGNOSIS — D84.9 IMMUNOSUPPRESSION (H): ICD-10-CM

## 2021-12-28 DIAGNOSIS — Z94.0 HTN, KIDNEY TRANSPLANT RELATED: ICD-10-CM

## 2021-12-28 DIAGNOSIS — Z48.298 AFTERCARE FOLLOWING ORGAN TRANSPLANT: Primary | ICD-10-CM

## 2021-12-28 DIAGNOSIS — Z94.0 KIDNEY REPLACED BY TRANSPLANT: ICD-10-CM

## 2021-12-28 DIAGNOSIS — I15.1 HTN, KIDNEY TRANSPLANT RELATED: ICD-10-CM

## 2021-12-28 PROCEDURE — 99214 OFFICE O/P EST MOD 30 MIN: CPT | Mod: 95 | Performed by: INTERNAL MEDICINE

## 2021-12-28 ASSESSMENT — MIFFLIN-ST. JEOR: SCORE: 891.02

## 2021-12-28 NOTE — LETTER
12/28/2021     RE: Maribel Yang  [address]  [facility] [id number]     Dear Colleague,    Thank you for referring your patient, Maribel Yang, to the [facility] [place] [facility]. Please see a copy of my visit note below.      Start 2:07  Stop 2:33    Chronic TRANSPLANT NEPHROLOGY VISIT    Assessment & Plan   # LDKT: baseline Cr ~ 1.3-1.6; Stable   - Proteinuria: Moderate 1g/g checked 4/2018    - Date DSA Last Checked: 4/2018 Latest DSA: No   - BK Viremia: No   - Kidney Tx Biopsy: 2005, 2006 , suspicious for mild tubulointerstitial Chronic rejection, mild to moderate treated with steroid thymo 4 doses, solumedrol x3 doeses and OKT3 x2 doses.       # Immunosuppression: Mycophenolic acid (dose 360 mg every 12 hours) and Prednisone (dose 5 mg daily)   - Continue with intensive monitoring of immunosuppression for efficacy and toxicity.   - Changes: Not at this time would favor continuing MPA for now rather than switcing back to sirolimus given complicated postop course, wound, etc-will revisit if recurrent malignancy is a concern    # Prophylaxis:   - PJP: None add CD4    # Hx of multiple malignancies: in remission -switched to sirolimus in the past but then back to MPA as of 4/2021 followng a complicated AAA repair course, infected graft requiring debridement and wound vac   squamous cell lung cancer s/p SBRT (2014)  anal canal carcinoma s/p chemoradiation (6/2018),    # Hypertension: Controlled; Goal BP: < 140/90   - Volume status: Euvolemic   - Changes: No her BP is controlled at home, cont same medication now no change    # Anemia in Chronic Renal Disease: Hgb: Stable   - Iron studies: Not checked recently    # Mineral Bone Disorder:   - Secondary renal hyperparathyroidism; PTH level is: Not checked recently  - Vitamin D; level is: Not checked recently  - Calcium; level is: Normal  - Phosphorus; level is: Not  checked recently, but was normal last check    # EBV viremia: weight loss following surgery in April, no night sweats, no masses/lymphadenopathy noted on last CT imaging in Aug 2021,  will  add EBV to labs, on  lower IS    # Diarrhea: chronic, colonoscopy unremarkable, improved this year on probiotics       # CAD: S/P LIUDMILA to RCA - no anginal sx - on BB/ statin/ ASA     # HFref: ischemic cardiomyopathy, last echo 30-35% 6/21, followed by core clinic, on torsemide 10 mg po  Every day, bblocker and low dose ACEI    # Hx Lung Ca - S/P radiation therapy - in remission scheduled for f/up CT in March 2022     # PAD: S/P EVAR 4/21 -5.9cm AAA and occluded right external iliac artery s/p EVAR with femorofemoral bypass (4/6/21) c/b inferior STEMI s/p LIUDMILA to proximal RCA who was admitted on 4/23 due to endovascular infection requiring wound vac.         #COPD - long standing smoking history - currently abstinent    # Skin Cancer: due for dermatology visit   - New lesions: none   - Discussed sun protection and recommend regular follow up with Dermatology    # Medical Compliance: Yes    # Transplant History:  Etiology of kidney failure: Alport disease  Tx: LDKT  Transplant: 9/20/2004 (Kidney)  Donor Type: Living      Donor Class: Standard Criteria Donor  History of BK viremia: No  History of CMV viremia: No  History of EBV viremia: Yes  Significant changes in immunosuppression: None  Significant transplant-related complications: EBV viremia    Transplant Office Phone Number: [phone]    Assessment and plan was discussed with the patient and she voiced her understanding and agreement.    Return visit: 6 months  Flaca Alejandre MD         Chief Complaint   Ms. Yang is a 71 year old here for immunosuppression management       History of Present Illness     Ms Yang is a 71 year old female with history of Alport syndrome s/p LDKT 2004 that get complicated by rejections between [id number], she was treated with thymo,  "solumedrol and OKT3 but her CR remain in the higher 2 and was re listed for kidney transplant but her listing removed 2008 because she recover and GFR improved. She also get complicated by EBV viremia and recurrent cancer lung , anal , cervix and skin, her IS changed to only sirolimus and prednisone but was switched to MPA & prednisone as of April 2021 prior to surgery for AAA in April 2021.     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    She reports significant weight loss since her surgery down to 80 lbs, but she gained 10 lbs over the past months. She reports fatigue, no night sweats  BP is better controlled with recent addition of hydralazine,   She has chronic shortness of breath since her aneurysm repair in April. She is currently on torsemide 10mg every other day; she denies any leg swelling or orthopnea.   She was recently seen by surgeon last week who evaluated her for possible R sided \"hernia\" that was painful, but none of CT imaging showed a hernia. She denies changes in bowel habits lately. She has had chronic diarrhea for years but it improved She feels it ha gotten bigger.   No recent cancer issues lately     COVID: x2 and a booster in Nov. , advised to scheduled 2nd booster in May  Flu shot: not received  Current IS: Myfortic 360/360 (switched from sirolimus since April.2021 following her aneurysm repair)  Prednisone 5   Home BP: not checking    Review of Systems   A comprehensive review of systems was obtained and negative, except as noted in the HPI or PMH.    Problem List   Patient Active Problem " List   Diagnosis     Other specified congenital anomalies     Kidney replaced by transplant     S/P LEEP of cervix     CARDIOVASCULAR SCREENING; LDL GOAL LESS THAN 100     Immunosuppression (H)     HTN, kidney transplant related     History of basal cell carcinoma     YUNIOR III (vulvar intraepithelial neoplasia III)     Peripheral artery disease (H)     Squamous cell lung cancer (H)     Lumbago     Vaginal dysplasia     Alopecia     Cherry angioma     Skin cancer screening     Intertrigo     History of basal cell carcinoma     Malignant neoplasm of anal canal (H)     Aftercare following organ transplant     Age-related osteoporosis without current pathological fracture     Acute deep vein thrombosis (DVT) of proximal vein of lower extremity, unspecified laterality (H)     Chronic kidney disease, stage 3 (H)     Abdominal aortic aneurysm (AAA) without rupture (H)     Hematoma     Physical deconditioning     Congestive heart failure, unspecified HF chronicity, unspecified heart failure type (H)     Coronary artery disease involving native coronary artery with angina pectoris with documented spasm (H)       Social History   Social History     Tobacco Use     Smoking status: Former Smoker     Packs/day: 0.30     Years: 35.00     Pack years: 10.50     Types: Cigarettes     Start date: 1967     Quit date: 3/1/2021     Years since quittin.8     Smokeless tobacco: Never Used     Tobacco comment: Couple times a week. 1-2 cigs 1-2x a week.   Substance Use Topics     Alcohol use: Not Currently     Alcohol/week: 0.0 standard drinks     Comment: rarely     Drug use: Not Currently     Types: Marijuana       Allergies   Allergies   Allergen Reactions     Blood Transfusion Related (Informational Only) Other (See Comments)     Patient has a history of a clinically significant antibody against RBC antigens.  A delay in compatible RBCs may occur.     Ultracet Nausea and Vomiting and Hives     Hydrocodone Nausea and Vomiting and  "Hives       Medications   Current Outpatient Medications   Medication Sig     acetaminophen (TYLENOL) 325 MG tablet Take 2 tablets (650 mg) by mouth every 4 hours as needed for other (For optimal non-opioid multimodal pain management to improve pain control.)     aspirin (ASA) 81 MG chewable tablet Take 1 tablet (81 mg) by mouth daily     atorvastatin (LIPITOR) 80 MG tablet Take 1 tablet (80 mg) by mouth daily     calcium carbonate 600 mg-vitamin D 400 units (CALTRATE) 600-400 MG-UNIT per tablet Take 1 tablet by mouth 2 times daily     clopidogrel (PLAVIX) 75 MG tablet Take 1 tablet (75 mg) by mouth daily     fluticasone (FLONASE) 50 MCG/ACT nasal spray Spray 1 spray into both nostrils daily     hydrALAZINE (APRESOLINE) 10 MG tablet Take 1 tablet (10 mg) by mouth 3 times daily     isosorbide mononitrate (IMDUR) 60 MG 24 hr tablet Take 1 tablet (60 mg) by mouth daily     lisinopril (ZESTRIL) 2.5 MG tablet Take 1 tablet (2.5 mg) by mouth daily     metoprolol tartrate (LOPRESSOR) 100 MG tablet Take 1 tablet (100 mg) by mouth 2 times daily     mycophenolic acid (GENERIC EQUIVALENT) 360 MG EC tablet Take 1 tablet (360 mg) by mouth 2 times daily     Nutritional Supplements (ENSURE CLEAR) LIQD Take 1 Bottle by mouth daily     pantoprazole (PROTONIX) 40 MG EC tablet Take 1 tablet (40 mg) by mouth every morning (before breakfast)     predniSONE (DELTASONE) 5 MG tablet Take 1 tablet (5 mg) by mouth daily     torsemide (DEMADEX) 10 MG tablet Take 1 tablet (10mg) once every other day. Additional use as directed by CORE clinic.     mupirocin (BACTROBAN) 2 % external ointment Apply topically 3 times daily (Patient not taking: Reported on 12/23/2021)     No current facility-administered medications for this visit.     There are no discontinued medications.    Physical Exam   Vital Signs: Ht 1.575 m (5' 2\")   Wt 41.3 kg (91 lb)   Breastfeeding No   BMI 16.64 kg/m      GENERAL APPEARANCE: alert and no distress  HENT: mouth " without ulcers or lesions  LYMPHATICS: no cervical or supraclavicular nodes  RESP: lungs clear to auscultation - no rales, rhonchi or wheezes  CV: regular rhythm, normal rate, no rub, no murmur  EDEMA: no LE edema bilaterally  ABDOMEN: soft, nondistended, nontender, bowel sounds normal  MS: extremities normal - no gross deformities noted, no evidence of inflammation in joints, no muscle tenderness  SKIN: no rash  TX KIDNEY: normal left side   DIALYSIS ACCESS:  None    Data     Renal Latest Ref Rng & Units 10/27/2021 9/29/2021 8/25/2021   Na 133 - 144 mmol/L 137 137 134   K 3.4 - 5.3 mmol/L 4.5 4.3 4.2   Cl 94 - 109 mmol/L 106 105 98   CO2 20 - 32 mmol/L 28 25 31   BUN 7 - 30 mg/dL 34(H) 27 42(H)   Cr 0.52 - 1.04 mg/dL 1.38(H) 1.45(H) 1.57(H)   Glucose 70 - 99 mg/dL 101(H) 160(H) 107(H)   Ca  8.5 - 10.1 mg/dL 9.5 9.3 9.2   Mg 1.6 - 2.3 mg/dL - - -     Bone Health Latest Ref Rng & Units 4/28/2021 4/27/2021 4/26/2021   Phos 2.5 - 4.5 mg/dL 2.8 3.4 4.5   PTHi 18 - 80 pg/mL - - -     Heme Latest Ref Rng & Units 8/30/2021 8/25/2021 7/20/2021   WBC 4.0 - 11.0 10e3/uL - 7.2 8.8   Hgb 11.7 - 15.7 g/dL - 11.7 12.4   Plt 150 - 450 10e3/uL 282 67(L) 326   ABSOLUTE NEUTROPHIL 1.6 - 8.3 10e9/L - - -   ABSOLUTE LYMPHOCYTES 0.8 - 5.3 10e9/L - - -   ABSOLUTE MONOCYTES 0.0 - 1.3 10e9/L - - -   ABSOLUTE EOSINOPHILS 0.0 - 0.7 10e9/L - - -   ABSOLUTE BASOPHILS 0.0 - 0.2 10e9/L - - -   ABS IMMATURE GRANULOCYTES 0 - 0.4 10e9/L - - -   ABSOLUTE NUCLEATED RBC - - - -     Liver Latest Ref Rng & Units 6/24/2021 6/23/2021 6/22/2021   AP 40 - 150 U/L 139 150 175(H)   TBili 0.2 - 1.3 mg/dL 0.2 0.3 0.3   DBili 0.0 - 0.2 mg/dL - - -   ALT 0 - 50 U/L 14 15 19   AST 0 - 45 U/L 9 12 14   Tot Protein 6.8 - 8.8 g/dL 6.7(L) 6.8 8.0   Albumin 3.4 - 5.0 g/dL 2.8(L) 2.8(L) 3.3(L)     Pancreas Latest Ref Rng & Units 6/22/2021 4/8/2021 10/23/2009   A1C 0 - 5.6 % - 5.6 -   Lipase 73 - 393 U/L 113 - 56     Iron studies Latest Ref Rng & Units 8/22/2008 3/8/2007  7/6/2004   Iron 35 - 180 ug/dL 68 106 -   Ferritin 10 - 300 ng/mL 142 127 39     [facility] Txp Virology Latest Ref Rng & Units 4/26/2021 2/15/2019 10/30/2018   CVM DNA Quant - - - -   CMV QUANT IU/ML CMVND:CMV DNA Not Detected [IU]/mL - - -   LOG IU/ML OF CMVQNT <2.1 [Log:IU]/mL - - -   BK Spec - - - -   BK Res <1000 copies/mL - - -   BK Log <3.0 Log copies/mL - - -   EBV DNA COPIES/ML EBVNEG:EBV DNA Not Detected [Copies]/mL 3,065(A) 7,769(A) 2,938(A)   EBV DNA LOG OF COPIES <2.7 [Log:copies]/mL 3.5(H) 3.9(H) 3.5(H)            Recent Labs   Lab Test 04/23/18  0905   DOSMPA Not Provided   MPACID 0.60*   MPAG 28.8*     CT a/p  Postoperative changes of aortic to left common iliac stent graft.  There is mural thrombus present in the proximal stent graft occupying  less than 50% of the lumen as detailed the body of the report. Minimal  change in size of the aneurysmal sac without evidence for endoleak.   2. Postoperative changes of left to right femorofemoral bypass with a  pseudoaneurysm at the level of the right common femoral arterial  anastomosis measuring up to 2 cm. This was not visualized on the  comparison vascular ultrasound 4/24/2021 and there is no prior  postprocedural CT with contrast for comparison.  3. Resolved or nearly completely resolved subcutaneous hematoma  adjacent to the bypass graft.  4. Unchanged thoracic aortic aneurysm measuring up to 5 cm in the  distal descending thoracic aorta, also containing mural thrombus which  is not significantly changed compared to 6/22/2021 chest CTA .  5. Aberrant retroesophageal right subclavian artery with a new focal  dissection in the proximal segment.  6. Severe upper lobe and centrilobular predominant emphysematous  change.  7. Unchanged spiculated nodule in the anterior right upper lobe and  scattered sub-6 mm pulmonary nodules as detailed above. Continued  attention on follow-up recommended.  8. Colonic diverticulosis without evidence for acute  diverticulitis.    Again, thank you for allowing me to participate in the care of your patient.      Sincerely,    Flaca Alejandre MD

## 2021-12-28 NOTE — PROGRESS NOTES
Start 2:07  Stop 2:33    Chronic TRANSPLANT NEPHROLOGY VISIT    Assessment & Plan   # LDKT: baseline Cr ~ 1.3-1.6; Stable   - Proteinuria: Moderate 1g/g checked 4/2018    - Date DSA Last Checked: 4/2018 Latest DSA: No   - BK Viremia: No   - Kidney Tx Biopsy: 2005, 2006 , suspicious for mild tubulointerstitial Chronic rejection, mild to moderate treated with steroid thymo 4 doses, solumedrol x3 doeses and OKT3 x2 doses.       # Immunosuppression: Mycophenolic acid (dose 360 mg every 12 hours) and Prednisone (dose 5 mg daily)   - Continue with intensive monitoring of immunosuppression for efficacy and toxicity.   - Changes: Not at this time would favor continuing MPA for now rather than switcing back to sirolimus given complicated postop course, wound, etc-will revisit if recurrent malignancy is a concern    # Prophylaxis:   - PJP: None add CD4    # Hx of multiple malignancies: in remission -switched to sirolimus in the past but then back to MPA as of 4/2021 followng a complicated AAA repair course, infected graft requiring debridement and wound vac   squamous cell lung cancer s/p SBRT (2014)  anal canal carcinoma s/p chemoradiation (6/2018),    # Hypertension: Controlled; Goal BP: < 140/90   - Volume status: Euvolemic   - Changes: No her BP is controlled at home, cont same medication now no change    # Anemia in Chronic Renal Disease: Hgb: Stable   - Iron studies: Not checked recently    # Mineral Bone Disorder:   - Secondary renal hyperparathyroidism; PTH level is: Not checked recently  - Vitamin D; level is: Not checked recently  - Calcium; level is: Normal  - Phosphorus; level is: Not checked recently, but was normal last check    # EBV viremia: weight loss following surgery in April, no night sweats, no masses/lymphadenopathy noted on last CT imaging in Aug 2021,  will  add EBV to labs, on  lower IS    # Diarrhea: chronic, colonoscopy unremarkable, improved this year on probiotics       # CAD: S/P LIUDMILA to RCA -  no anginal sx - on BB/ statin/ ASA     # HFref: ischemic cardiomyopathy, last echo 30-35% 6/21, followed by core clinic, on torsemide 10 mg po  Every day, bblocker and low dose ACEI    # Hx Lung Ca - S/P radiation therapy - in remission scheduled for f/up CT in March 2022     # PAD: S/P EVAR 4/21 -5.9cm AAA and occluded right external iliac artery s/p EVAR with femorofemoral bypass (4/6/21) c/b inferior STEMI s/p LIUDMILA to proximal RCA who was admitted on 4/23 due to endovascular infection requiring wound vac.         #COPD - long standing smoking history - currently abstinent    # Skin Cancer: due for dermatology visit   - New lesions: none   - Discussed sun protection and recommend regular follow up with Dermatology    # Medical Compliance: Yes    # Transplant History:  Etiology of kidney failure: Alport disease  Tx: LDKT  Transplant: 9/20/2004 (Kidney)  Donor Type: Living      Donor Class: Standard Criteria Donor  History of BK viremia: No  History of CMV viremia: No  History of EBV viremia: Yes  Significant changes in immunosuppression: None  Significant transplant-related complications: EBV viremia    Transplant Office Phone Number: [phone]    Assessment and plan was discussed with the patient and she voiced her understanding and agreement.    Return visit: 6 months  Flaca Alejandre MD         Chief Complaint   Ms. Yang is a 71 year old here for immunosuppression management       History of Present Illness     Ms Yang is a 71 year old female with history of Alport syndrome s/p LDKT 2004 that get complicated by rejections between [id number], she was treated with thymo, solumedrol and OKT3 but her CR remain in the higher 2 and was re listed for kidney transplant but her listing removed 2008 because she recover and GFR improved. She also get complicated by EBV viremia and recurrent cancer lung , anal , cervix and skin, her IS changed to only sirolimus and prednisone but was switched to MPA & prednisone as  "of April 2021 prior to surgery for AAA in April 2021.     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    She reports significant weight loss since her surgery down to 80 lbs, but she gained 10 lbs over the past months. She reports fatigue, no night sweats  BP is better controlled with recent addition of hydralazine,   She has chronic shortness of breath since her aneurysm repair in April. She is currently on torsemide 10mg every other day; she denies any leg swelling or orthopnea.   She was recently seen by surgeon last week who evaluated her for possible R sided \"hernia\" that was painful, but none of CT imaging showed a hernia. She denies changes in bowel habits lately. She has had chronic diarrhea for years but it improved She feels it ha gotten bigger.   No recent cancer issues lately     COVID: x2 and a booster in Nov. , advised to scheduled 2nd booster in May  Flu shot: not received  Current IS: Myfortic 360/360 (switched from sirolimus since April.2021 following her aneurysm repair)  Prednisone 5   Home BP: not checking    Review of Systems   A comprehensive review of systems was obtained and negative, except as noted in the HPI or PMH.    Problem List   Patient Active Problem List   Diagnosis     Other specified congenital anomalies     Kidney replaced by transplant     S/P LEEP of cervix     CARDIOVASCULAR SCREENING; LDL GOAL LESS THAN 100     Immunosuppression (H)     HTN, kidney transplant related     History of basal cell carcinoma     YUNIOR III (vulvar intraepithelial neoplasia III)     Peripheral artery " disease (H)     Squamous cell lung cancer (H)     Lumbago     Vaginal dysplasia     Alopecia     Cherry angioma     Skin cancer screening     Intertrigo     History of basal cell carcinoma     Malignant neoplasm of anal canal (H)     Aftercare following organ transplant     Age-related osteoporosis without current pathological fracture     Acute deep vein thrombosis (DVT) of proximal vein of lower extremity, unspecified laterality (H)     Chronic kidney disease, stage 3 (H)     Abdominal aortic aneurysm (AAA) without rupture (H)     Hematoma     Physical deconditioning     Congestive heart failure, unspecified HF chronicity, unspecified heart failure type (H)     Coronary artery disease involving native coronary artery with angina pectoris with documented spasm (H)       Social History   Social History     Tobacco Use     Smoking status: Former Smoker     Packs/day: 0.30     Years: 35.00     Pack years: 10.50     Types: Cigarettes     Start date: 1967     Quit date: 3/1/2021     Years since quittin.8     Smokeless tobacco: Never Used     Tobacco comment: Couple times a week. 1-2 cigs 1-2x a week.   Substance Use Topics     Alcohol use: Not Currently     Alcohol/week: 0.0 standard drinks     Comment: rarely     Drug use: Not Currently     Types: Marijuana       Allergies   Allergies   Allergen Reactions     Blood Transfusion Related (Informational Only) Other (See Comments)     Patient has a history of a clinically significant antibody against RBC antigens.  A delay in compatible RBCs may occur.     Ultracet Nausea and Vomiting and Hives     Hydrocodone Nausea and Vomiting and Hives       Medications   Current Outpatient Medications   Medication Sig     acetaminophen (TYLENOL) 325 MG tablet Take 2 tablets (650 mg) by mouth every 4 hours as needed for other (For optimal non-opioid multimodal pain management to improve pain control.)     aspirin (ASA) 81 MG chewable tablet Take 1 tablet (81 mg) by mouth daily  "    atorvastatin (LIPITOR) 80 MG tablet Take 1 tablet (80 mg) by mouth daily     calcium carbonate 600 mg-vitamin D 400 units (CALTRATE) 600-400 MG-UNIT per tablet Take 1 tablet by mouth 2 times daily     clopidogrel (PLAVIX) 75 MG tablet Take 1 tablet (75 mg) by mouth daily     fluticasone (FLONASE) 50 MCG/ACT nasal spray Spray 1 spray into both nostrils daily     hydrALAZINE (APRESOLINE) 10 MG tablet Take 1 tablet (10 mg) by mouth 3 times daily     isosorbide mononitrate (IMDUR) 60 MG 24 hr tablet Take 1 tablet (60 mg) by mouth daily     lisinopril (ZESTRIL) 2.5 MG tablet Take 1 tablet (2.5 mg) by mouth daily     metoprolol tartrate (LOPRESSOR) 100 MG tablet Take 1 tablet (100 mg) by mouth 2 times daily     mycophenolic acid (GENERIC EQUIVALENT) 360 MG EC tablet Take 1 tablet (360 mg) by mouth 2 times daily     Nutritional Supplements (ENSURE CLEAR) LIQD Take 1 Bottle by mouth daily     pantoprazole (PROTONIX) 40 MG EC tablet Take 1 tablet (40 mg) by mouth every morning (before breakfast)     predniSONE (DELTASONE) 5 MG tablet Take 1 tablet (5 mg) by mouth daily     torsemide (DEMADEX) 10 MG tablet Take 1 tablet (10mg) once every other day. Additional use as directed by [facility] clinic.     mupirocin (BACTROBAN) 2 % external ointment Apply topically 3 times daily (Patient not taking: Reported on 12/23/2021)     No current facility-administered medications for this visit.     There are no discontinued medications.    Physical Exam   Vital Signs: Ht 1.575 m (5' 2\")   Wt 41.3 kg (91 lb)   Breastfeeding No   BMI 16.64 kg/m      GENERAL APPEARANCE: alert and no distress  HENT: mouth without ulcers or lesions  LYMPHATICS: no cervical or supraclavicular nodes  RESP: lungs clear to auscultation - no rales, rhonchi or wheezes  CV: regular rhythm, normal rate, no rub, no murmur  EDEMA: no LE edema bilaterally  ABDOMEN: soft, nondistended, nontender, bowel sounds normal  MS: extremities normal - no gross deformities noted, no " evidence of inflammation in joints, no muscle tenderness  SKIN: no rash  TX KIDNEY: normal left side   DIALYSIS ACCESS:  None    Data     Renal Latest Ref Rng & Units 10/27/2021 9/29/2021 8/25/2021   Na 133 - 144 mmol/L 137 137 134   K 3.4 - 5.3 mmol/L 4.5 4.3 4.2   Cl 94 - 109 mmol/L 106 105 98   CO2 20 - 32 mmol/L 28 25 31   BUN 7 - 30 mg/dL 34(H) 27 42(H)   Cr 0.52 - 1.04 mg/dL 1.38(H) 1.45(H) 1.57(H)   Glucose 70 - 99 mg/dL 101(H) 160(H) 107(H)   Ca  8.5 - 10.1 mg/dL 9.5 9.3 9.2   Mg 1.6 - 2.3 mg/dL - - -     Bone Health Latest Ref Rng & Units 4/28/2021 4/27/2021 4/26/2021   Phos 2.5 - 4.5 mg/dL 2.8 3.4 4.5   PTHi 18 - 80 pg/mL - - -     Heme Latest Ref Rng & Units 8/30/2021 8/25/2021 7/20/2021   WBC 4.0 - 11.0 10e3/uL - 7.2 8.8   Hgb 11.7 - 15.7 g/dL - 11.7 12.4   Plt 150 - 450 10e3/uL 282 67(L) 326   ABSOLUTE NEUTROPHIL 1.6 - 8.3 10e9/L - - -   ABSOLUTE LYMPHOCYTES 0.8 - 5.3 10e9/L - - -   ABSOLUTE MONOCYTES 0.0 - 1.3 10e9/L - - -   ABSOLUTE EOSINOPHILS 0.0 - 0.7 10e9/L - - -   ABSOLUTE BASOPHILS 0.0 - 0.2 10e9/L - - -   ABS IMMATURE GRANULOCYTES 0 - 0.4 10e9/L - - -   ABSOLUTE NUCLEATED RBC - - - -     Liver Latest Ref Rng & Units 6/24/2021 6/23/2021 6/22/2021   AP 40 - 150 U/L 139 150 175(H)   TBili 0.2 - 1.3 mg/dL 0.2 0.3 0.3   DBili 0.0 - 0.2 mg/dL - - -   ALT 0 - 50 U/L 14 15 19   AST 0 - 45 U/L 9 12 14   Tot Protein 6.8 - 8.8 g/dL 6.7(L) 6.8 8.0   Albumin 3.4 - 5.0 g/dL 2.8(L) 2.8(L) 3.3(L)     Pancreas Latest Ref Rng & Units 6/22/2021 4/8/2021 10/23/2009   A1C 0 - 5.6 % - 5.6 -   Lipase 73 - 393 U/L 113 - 56     Iron studies Latest Ref Rng & Units 8/22/2008 3/8/2007 7/6/2004   Iron 35 - 180 ug/dL 68 106 -   Ferritin 10 - 300 ng/mL 142 127 39     [facility] Txp Virology Latest Ref Rng & Units 4/26/2021 2/15/2019 10/30/2018   CVM DNA Quant - - - -   CMV QUANT IU/ML CMVND:CMV DNA Not Detected [IU]/mL - - -   LOG IU/ML OF CMVQNT <2.1 [Log:IU]/mL - - -   BK Spec - - - -   BK Res <1000 copies/mL - - -   BK Log <3.0  Log copies/mL - - -   EBV DNA COPIES/ML EBVNEG:EBV DNA Not Detected [Copies]/mL 3,065(A) 7,769(A) 2,938(A)   EBV DNA LOG OF COPIES <2.7 [Log:copies]/mL 3.5(H) 3.9(H) 3.5(H)            Recent Labs   Lab Test 04/23/18  0905   DOSMPA Not Provided   MPACID 0.60*   MPAG 28.8*     CT a/p  Postoperative changes of aortic to left common iliac stent graft.  There is mural thrombus present in the proximal stent graft occupying  less than 50% of the lumen as detailed the body of the report. Minimal  change in size of the aneurysmal sac without evidence for endoleak.   2. Postoperative changes of left to right femorofemoral bypass with a  pseudoaneurysm at the level of the right common femoral arterial  anastomosis measuring up to 2 cm. This was not visualized on the  comparison vascular ultrasound 4/24/2021 and there is no prior  postprocedural CT with contrast for comparison.  3. Resolved or nearly completely resolved subcutaneous hematoma  adjacent to the bypass graft.  4. Unchanged thoracic aortic aneurysm measuring up to 5 cm in the  distal descending thoracic aorta, also containing mural thrombus which  is not significantly changed compared to 6/22/2021 chest CTA .  5. Aberrant retroesophageal right subclavian artery with a new focal  dissection in the proximal segment.  6. Severe upper lobe and centrilobular predominant emphysematous  change.  7. Unchanged spiculated nodule in the anterior right upper lobe and  scattered sub-6 mm pulmonary nodules as detailed above. Continued  attention on follow-up recommended.  8. Colonic diverticulosis without evidence for acute diverticulitis.

## 2021-12-29 ENCOUNTER — OFFICE VISIT (OUTPATIENT)
Dept: CARDIOLOGY | Facility: CLINIC | Age: 71
End: 2021-12-29
Attending: NURSE PRACTITIONER
Payer: COMMERCIAL

## 2021-12-29 ENCOUNTER — LAB (OUTPATIENT)
Dept: LAB | Facility: CLINIC | Age: 71
End: 2021-12-29
Attending: NURSE PRACTITIONER
Payer: COMMERCIAL

## 2021-12-29 VITALS
OXYGEN SATURATION: 95 % | WEIGHT: 92 LBS | HEART RATE: 64 BPM | BODY MASS INDEX: 16.83 KG/M2 | DIASTOLIC BLOOD PRESSURE: 116 MMHG | SYSTOLIC BLOOD PRESSURE: 181 MMHG

## 2021-12-29 DIAGNOSIS — I50.9 CONGESTIVE HEART FAILURE, UNSPECIFIED HF CHRONICITY, UNSPECIFIED HEART FAILURE TYPE (H): Primary | ICD-10-CM

## 2021-12-29 DIAGNOSIS — I50.9 CONGESTIVE HEART FAILURE, UNSPECIFIED HF CHRONICITY, UNSPECIFIED HEART FAILURE TYPE (H): ICD-10-CM

## 2021-12-29 DIAGNOSIS — I10 HYPERTENSION, BENIGN ESSENTIAL, GOAL BELOW 140/90: ICD-10-CM

## 2021-12-29 DIAGNOSIS — Z48.298 AFTERCARE FOLLOWING ORGAN TRANSPLANT: ICD-10-CM

## 2021-12-29 LAB
ANION GAP SERPL CALCULATED.3IONS-SCNC: 7 MMOL/L (ref 3–14)
BUN SERPL-MCNC: 30 MG/DL (ref 7–30)
CALCIUM SERPL-MCNC: 10 MG/DL (ref 8.5–10.1)
CHLORIDE BLD-SCNC: 104 MMOL/L (ref 94–109)
CO2 SERPL-SCNC: 27 MMOL/L (ref 20–32)
CREAT SERPL-MCNC: 1.51 MG/DL (ref 0.52–1.04)
GFR SERPL CREATININE-BSD FRML MDRD: 37 ML/MIN/1.73M2
GLUCOSE BLD-MCNC: 117 MG/DL (ref 70–99)
POTASSIUM BLD-SCNC: 4.3 MMOL/L (ref 3.4–5.3)
SODIUM SERPL-SCNC: 138 MMOL/L (ref 133–144)

## 2021-12-29 PROCEDURE — 86360 T CELL ABSOLUTE COUNT/RATIO: CPT | Mod: 90 | Performed by: PATHOLOGY

## 2021-12-29 PROCEDURE — 36415 COLL VENOUS BLD VENIPUNCTURE: CPT | Performed by: PATHOLOGY

## 2021-12-29 PROCEDURE — 99000 SPECIMEN HANDLING OFFICE-LAB: CPT | Performed by: PATHOLOGY

## 2021-12-29 PROCEDURE — 87799 DETECT AGENT NOS DNA QUANT: CPT | Mod: 90 | Performed by: PATHOLOGY

## 2021-12-29 PROCEDURE — 86359 T CELLS TOTAL COUNT: CPT | Mod: 90 | Performed by: PATHOLOGY

## 2021-12-29 PROCEDURE — G0463 HOSPITAL OUTPT CLINIC VISIT: HCPCS

## 2021-12-29 PROCEDURE — 99214 OFFICE O/P EST MOD 30 MIN: CPT | Performed by: NURSE PRACTITIONER

## 2021-12-29 PROCEDURE — 80048 BASIC METABOLIC PNL TOTAL CA: CPT | Performed by: PATHOLOGY

## 2021-12-29 ASSESSMENT — PAIN SCALES - GENERAL: PAINLEVEL: MODERATE PAIN (4)
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
## 2021-12-29 NOTE — NURSING NOTE
Chief Complaint   Patient presents with     Follow Up     RTN [facility]: 71 year old female presents with systolic heart failure, ef 35-40% for follow up with labs prior
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
## 2021-12-29 NOTE — TELEPHONE ENCOUNTER
Dr. Martinez,    Please see behaviewt message.     Thanks,  AVERY Smith  [facility]

## 2021-12-30 LAB
CD3 CELLS # BLD: 407 CELLS/UL (ref 603–2990)
CD3 CELLS NFR BLD: 60 % (ref 49–84)
CD3+CD4+ CELLS # BLD: 225 CELLS/UL (ref 441–2156)
CD3+CD4+ CELLS NFR BLD: 33 % (ref 28–63)
CD3+CD4+ CELLS/CD3+CD8+ CLL BLD: 1.22 % (ref 1.4–2.6)
CD3+CD8+ CELLS # BLD: 185 CELLS/UL (ref 125–1312)
CD3+CD8+ CELLS NFR BLD: 27 % (ref 10–40)
EBV DNA COPIES/ML, INSTRUMENT: ABNORMAL COPIES/ML
EBV DNA SPEC NAA+PROBE-LOG#: 4.4 {LOG_COPIES}/ML
T CELL COMMENT: ABNORMAL

## 2021-12-31 ENCOUNTER — TELEPHONE (OUTPATIENT)
Dept: TRANSPLANT | Facility: CLINIC | Age: 71
End: 2021-12-31
Payer: COMMERCIAL

## 2021-12-31 DIAGNOSIS — D84.9 IMMUNOSUPPRESSED STATUS (H): ICD-10-CM

## 2021-12-31 DIAGNOSIS — Z79.899 IMMUNOSUPPRESSIVE MANAGEMENT ENCOUNTER FOLLOWING KIDNEY TRANSPLANT: ICD-10-CM

## 2021-12-31 DIAGNOSIS — Z94.0 IMMUNOSUPPRESSIVE MANAGEMENT ENCOUNTER FOLLOWING KIDNEY TRANSPLANT: ICD-10-CM

## 2021-12-31 DIAGNOSIS — Z94.0 KIDNEY TRANSPLANTED: Primary | ICD-10-CM

## 2021-12-31 DIAGNOSIS — Z48.298 AFTERCARE FOLLOWING ORGAN TRANSPLANT: ICD-10-CM

## 2021-12-31 NOTE — TELEPHONE ENCOUNTER
Flaca Alejandre MD   12/31/2021 11:51 AM CST Back to Top        Quarterly     Lisseth Heath RN   12/31/2021  9:15 AM CST         EBV increased.  Results routed to Dr. Alejandre.  --- How often do you want labs and EBV?       OUTCOME:  Orders placed for QUARTERLY Transplant labs and EBV PCR.

## 2022-01-04 NOTE — TELEPHONE ENCOUNTER
I think if it is bothering her - I would like to see her -   Please scheduled in a provider access only spot   Thanks  PN

## 2022-01-18 NOTE — PROGRESS NOTES
05/03/21 1503   Quick Adds   Quick Adds Certification   Type of Visit Initial PT Evaluation   Living Environment   People in home spouse   Current Living Arrangements house   Home Accessibility stairs to enter home;stairs within home   Number of Stairs, Main Entrance 3  (1 down to yard, then 2 RHEA into house, no rails)   Stair Railings, Main Entrance none   Number of Stairs, Within Home, Primary other (see comments)  (14 stairs)   Stair Railings, Within Home, Primary railing on left side (ascending)   Transportation Anticipated family or friend will provide   Living Environment Comments PT: bath - tub shower, low toilet, sink for support to stand, tub bench present but pt reporting it being too large for bathroom. Bedroom - queen bed, exits on R but changes position throughout the night d/t position of fan in her room.    Self-Care   Usual Activity Tolerance moderate   Current Activity Tolerance fair   Regular Exercise Yes   Activity/Exercise Type walking   Exercise Amount/Frequency 3-5 times/wk   Equipment Currently Used at Home walker, rolling   Activity/Exercise/Self-Care Comment PT: pt reports walking around the 2nd floor of her home intermittently throughout the day.   Disability/Function   Hearing Difficulty or Deaf no   Wear Glasses or Blind yes   Vision Management glasses   Concentrating, Remembering or Making Decisions Difficulty no   Difficulty Communicating no   Difficulty Eating/Swallowing no   Walking or Climbing Stairs Difficulty yes   Walking or Climbing Stairs ambulation difficulty, requires equipment   Mobility Management walker   Dressing/Bathing Difficulty no   Toileting issues no   Doing Errands Independently Difficulty (such as shopping) yes   Errands Management spouse and daughter assist   Fall history within last six months no   Change in Functional Status Since Onset of Current Illness/Injury yes   General Information   Onset of Illness/Injury or Date of Surgery 04/23/21  (date of admit  HM updated.      for sepsis)   Referring Physician Sue   Patient/Family Therapy Goals Statement (PT) To return home safely   Pertinent History of Current Problem (include personal factors and/or comorbidities that impact the POC) CMH: 4/23/21 admitted to [facility] for sepsis 2/2 R fem groin access site for fem-fem bypass; STEMI s/p RCA stent 4/7/21, fem-fem bypass 2/2 infrarenal AAA and thoracic aneurysm. HTN. PMH: SCC of R lung in remission since 2014, CKD s/p LDKT 2004.   Existing Precautions/Restrictions fall   Heart Disease Risk Factors High blood pressure;Lack of physical activity;Medical history;Age   General Observations PT: pt received sup in bed   Cognition   Orientation Status (Cognition) oriented x 4   Affect/Mental Status (Cognition) WNL   Follows Commands (Cognition) WNL   Pain Assessment   Patient Currently in Pain No   Integumentary/Edema   Integumentary/Edema other (describe)   Integumentary/Edema Comments Woc vac sites x2, one near each ASIS, draining serous fluid out of R woc vac site, suction on and working appropriately.   Posture    Posture Forward head position;Protracted shoulders;Kyphosis   Posture Comments PT: flexed posture   Strength   Manual Muscle Testing Quick Adds MMT: Shoulder;MMT: Elbow/Forearm;MMT: Wrist;MMT: Hand (General);MMT: Hip;MMT: Knee;MMT: Ankle   MMT: Shoulder   Shoulder Flexion - Left Side (4-/5) good minus, left   Shoulder ABduction - Left Side (4-/5) good minus, left   Shoulder Flexion - Right Side (4-/5) good minus, right   Shoulder ABduction - Right Side (4-/5) good minus, right   MMT: Elbow/Forearm, Rehab Eval   Elbow Flexion - Left Side (4-/5) good minus, left   Elbow Extension - Left Side (4-/5) good minus, left   Elbow Flexion - Right Side (4-/5) good minus, right   Elbow Extension - Right Side (4-/5) good minus, right   MMT: Hand   Hand Muscle Testing Results : R 4/5 and L 4-/5   MMT: Wrist   Wrist Muscle Testing Results AROM intact   MMT: Hip, Rehab Eval   Hip Flexion -  Left Side other (see comments)  (not tested d/t wo vac site)   Hip Extension - Left Side (3+/5) fair plus, left   Hip ABduction - Left Side (3+/5) fair plus, left   Hip ADduction - Left Side (3+/5) fair plus, left   Hip Flexion - Right Side other (see comments)  (NT d/t wo vac site)   Hip Extension - Right Side (3+/5) fair plus, right   Hip ABduction - Right Side (3+/5) fair plus, right   Hip ADduction - Right Side (3+/5) fair plus, right   MMT: Knee, Rehab Eval   Knee Flexion - Left Side (3+/5) fair plus, left   Knee Extension - Left Side (4-/5) good minus, left   Knee Flexion - Right Side (3+/5) fair plus, right   Knee Extension - Right Side (4-/5) good minus, right   MMT: Ankle, Rehab Eval   Ankle Dorsiflexion - Left Side (3+/5) fair plus, left   Ankle Plantarflexion - Left Side (3+/5) fair plus, left   Ankle Dorsiflexion - Right Side (3+/5) fair plus, right   Ankle Plantarflexion - Right Side (3+/5) fair plus, right   ARC Assessment Only   Acute Rehab Functional Assessment See IP Rehab Daily Documentation Flowsheet for Functional Mobility/ADL Assessment   Bed Mobility   Comment (Bed Mobility) PT: sup<>sit SBA   Transfers   Transfer Safety Comments PT: STS, FWW, CGA   Gait/Stairs (Locomotion)   Comment (Gait/Stairs) PT: amb, FWW, 160', CGA, w/c follow by second person   Balance   Balance no deficits were identified   Sensory Examination   Sensory Perception other (describe)   Sensory Perception Quick Adds Light touch   Sensation Light Touch   LUE w/i normal limits   LLE moderate impairment  (numb peripherally in femoral distribution)   RUE w/i normal limits   RLE moderate impairment  (numb peripherally in femoral distribution)   Clinical Impression   Criteria for Skilled Therapeutic Intervention yes, treatment indicated   PT Diagnosis (PT) Impaired strength, endurance, balance, integumentary deficits 2/2 wo vac and hospital stay   Influenced by the following impairments impaired strength, endurance, balance,  integument   Functional limitations due to impairments Bed mob, transfers, gait, stairs   Clinical Presentation Unstable/Unpredictable   Clinical Presentation Rationale PT: woc vac, significant cardiac hx, prior Mook, DVT   Clinical Decision Making (Complexity) high complexity   Therapy Frequency (PT) 6x/week  (60 min/day)   Predicted Duration of Therapy Intervention (days/wks) 10 days   Planned Therapy Interventions (PT) balance training;bed mobility training;gait training;home exercise program;manual therapy techniques;patient/family education;postural re-education;stair training;strengthening;stretching;transfer training   Anticipated Equipment Needs at Discharge (PT) other (see comments)  (owns FWW)   Risk & Benefits of therapy have been explained evaluation/treatment results reviewed;care plan/treatment goals reviewed;risks/benefits reviewed;current/potential barriers reviewed;participants voiced agreement with care plan;participants included;patient   Clinical Impression Comments Maribel will benefit from PT to progress her overall strength, endurance, balance and independnece amb with FWW to mod I level at d/c. She will need to navigate 17 steps total (3 to enter, 14 to 2nd floor) to access bed and bathroom. Pt owns FWW. Has family to assist at d/c.   PT Discharge Planning    PT Discharge Recommendation (DC Rec) home with assist;home with home care physical therapy   Therapy Certification   Start of care date 05/03/21   Certification date from 05/03/21   Certification date to 05/31/21   Medical Diagnosis Sepsis   Total Evaluation Time   Total Evaluation Time (Minutes) 27

## 2022-01-27 DIAGNOSIS — I50.9 CONGESTIVE HEART FAILURE, UNSPECIFIED HF CHRONICITY, UNSPECIFIED HEART FAILURE TYPE (H): ICD-10-CM

## 2022-01-28 RX ORDER — TORSEMIDE 10 MG/1
TABLET ORAL
Qty: 90 TABLET | Refills: 1 | OUTPATIENT
Start: 2022-01-28

## 2022-01-28 NOTE — PROGRESS NOTES
Colon and Rectal Surgery Clinic Note      RE: Maribel Yang  : 1950  JESSEE: 22    Maribel is a 70 year old woman who presents today for follow up of her anal cancer.     HPI:     Maribel has a past medical history of kidney transplant in , lung cancer in , cervical dysplasia, ananorectal condyloma and vulvar intraepithelial neoplasia 2. Dr. Db Urbina excised a patch if high grade perianal dysplasia in the right anterolateral position in . 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 MRI without pelvic or inguinal lymphadenopathy.     She completed chemoradiation on 18 and presents today or follow up.    Most recent CT AP on 21:  1. Large thick-walled fluid collection in the lower abdominal wall  extending from the right groin surgical site and right common femoral  artery along the femoral-femoral bypass graft to the left common  femoral artery. Surgical consultation is recommended.  2. Postprocedural changes of abdominal aortic EVAR with left uni-iliac  graft.     CTA CAP 21:  Impression:  1. Postoperative changes of aortic to left common iliac stent graft.  There is mural thrombus present in the proximal stent graft occupying  less than 50% of the lumen as detailed the body of the report. Minimal  change in size of the aneurysmal sac without evidence for endoleak.   2. Postoperative changes of left to right femorofemoral bypass with a  pseudoaneurysm at the level of the right common femoral arterial  anastomosis measuring up to 2 cm. This was not visualized on the  comparison vascular ultrasound 2021 and there is no prior  postprocedural CT with contrast for comparison.  3. Resolved or nearly completely resolved subcutaneous  "hematoma  adjacent to the bypass graft.  4. Unchanged thoracic aortic aneurysm measuring up to 5 cm in the  distal descending thoracic aorta, also containing mural thrombus which  is not significantly changed compared to 6/22/2021 chest CTA .  5. Aberrant retroesophageal right subclavian artery with a new focal  dissection in the proximal segment.  6. Severe upper lobe and centrilobular predominant emphysematous  change.  7. Unchanged spiculated nodule in the anterior right upper lobe and  scattered sub-6 mm pulmonary nodules as detailed above. Continued  attention on follow-up recommended.  8. Colonic diverticulosis without evidence for acute diverticulitis.  [Access Center: Right common femoral artery pseudoaneurysm]    Maribel had an AAA s/p EVAR with L-R femoral bypass graft on 4/26/21 c/b sepsis 2/2 R femoral groin access site and perigraft abscess, CAD with STEMI s/p LIUDMILA to RCA (4/7/21) and another STEMI post-operatively (4/23/21) attributed to coronary vasospasm, and HFrEF (EF 35-40%) secondary to ICM     I last saw Maribel on 8/10/21 without evidence of recurrence.    Maribel last saw Dr. Jag Rene on 2/21/2020, and she currently follows up routinely with SANDRA Cadet.  She has a repeat CT scan and a follow-up appointment with Angelica scheduled next month.    Interval History: Maribel had a difficult time related to her surgery and complications noted above starting last April.  Her weight is down to the 90 pound range and she has been unable to increase it.  Otherwise, she has no specific complaints.    Physical examination:  Examination was chaperoned by Chantell Shay CMA    Vitals: BP (!) 140/91 (BP Location: Left arm, Patient Position: Sitting, Cuff Size: Adult Small)   Pulse 70   Temp 97.5  F (36.4  C) (Oral)   Ht 5' 1.25\"   Wt 89 lb 4.8 oz   SpO2 99%   BMI 16.74 kg/m    BMI= Body mass index is 16.74 kg/m .     Maribel looks thin and frail but she is bright, interactive, and her " spirits are good..      Groin: No palpable inguinal lymphadenopathy. Bilateral scars well healed. Visual inspection of anus with external skin tags present. No visible lesions, palpation of external perianal area without lesions. ITZEL without palpable scarring or lesions. Anoscopy with normal appearing tissue, no scar visualized      Laboratory data:    Recent Labs   Lab Test 03/01/21  1508 07/22/14  1603 07/22/14  1603   WBC 5.8   < > 8.7   HGB 13.0   < > 14.4      < > 258   CR 1.38*   < > 1.60*   ALBUMIN 3.2*   < > 4.7   BILITOTAL 0.3   < > 0.4   ALKPHOS 132   < > 153*   ALT 21   < > 21   AST 17   < > 24   INR  --   --  0.94    < > = values in this interval not displayed.       Assessment/plan:    No evidence of recurrence on exam today.  Maribel is now almost 4 years out since her treatment.  Repeat anoscopy, ITZEL,  and inguinal node exam in 6 months. Continue to follow with oncology with SANDRA Cadet. Patient's questions were answered to her stated satisfaction and she is in agreement with this plan.    For details of past medical history, surgical history, family history, medications, allergies, and review of systems, please see details below.    Medical history:  Past Medical History:   Diagnosis Date     Abnormal coagulation profile     p [id number] heterozygote      Age-related osteoporosis without current pathological fracture 6/22/2019     Anemia      Antiplatelet or antithrombotic long-term use      ASCUS with positive high risk HPV 2007, 2015    + HPV 56, 54,& 6, colp - TAL III, Leep =TAL II     Basal cell carcinoma      Congestive heart failure, unspecified HF chronicity, unspecified heart failure type (H) 6/22/2021     Depressive disorder July 2015     Hypertension      Immunosuppressed status (H)     due meds     Kidney replaced by transplant 9/04    Living donor recipient,  Rejection 7/2005     LSIL (low grade squamous intraepithelial lesion) on Pap smear 4/2013    +HPV 33 or 45, 61        PAD (peripheral artery disease) (H)      PONV (postoperative nausea and vomiting)      Squamous cell lung cancer (H)      Thrombosis of leg 1967     Unspecified disorder of kidney and ureter     X-linked dominant Alport's syndrome.       Surgical history:  Past Surgical History:   Procedure Laterality Date     BIOPSY      Kidney, Lung, Breast     BIOPSY ANAL N/A 03/14/2018    Procedure: BIOPSY ANAL;;  Surgeon: Shabbir Leo MD;  Location: [facility]     BYPASS GRAFT FEMORAL FEMORAL Bilateral 04/25/2021    Procedure: Axplantation infected graft, femoral to femoral bypass with cadaveric artery, bilateral SATORIUS MUSCLE FLAP CREATION, evacuation of absece, vacuum closure;  Surgeon: Raven Lewis MD;  Location: [facility]     COLONOSCOPY       COLONOSCOPY N/A 08/09/2017    Procedure: COMBINED COLONOSCOPY, SINGLE OR MULTIPLE BIOPSY/POLYPECTOMY BY BIOPSY;;  Surgeon: Sushil Hyatt MD;  Location:  GI     COLPOSCOPY,LOOP ELECTRD CERVIX EXCIS  03/11/2008    TAL II     CONIZATION LEEP  07/17/2013    Procedure: CONIZATION LEEP;;  Surgeon: Liliana Renteria MD;  [facility]     CONIZATION LEEP N/A 08/17/2016    Procedure: CONIZATION LEEP;  Surgeon: Liliana Renteria MD;  [facility]     CV CORONARY ANGIOGRAM N/A 04/06/2021    Procedure: CV CORONARY ANGIOGRAM;  Surgeon: Sincere Rosas MD;  Location:  [facility]     CV CORONARY ANGIOGRAM N/A 04/25/2021    Procedure: Coronary Angiogram;  Surgeon: Sincere Rosas MD;  Location:  [facility]     CV PCI STENT DRUG ELUTING N/A 04/06/2021    Procedure: Percutaneous Coronary Intervention Stent Drug Eluting;  Surgeon: Sincere Rosas MD;  Location:  [facility]ATH [facility]     ENDOVASCULAR REPAIR ANEURYSM AORTOILIAC Bilateral 04/06/2021    Procedure: Endovascular Abdominal Aortic Aneurysm Repair with Aortouniiliac Device and Femoral-Femoral Artery Bypass with [id number] Artegraft;  Surgeon: Abena Ferrara  MD BERTHA;  Location: [facility]     EXAM UNDER ANESTHESIA ANUS  07/15/2014    Procedure: EXAM UNDER ANESTHESIA ANUS;  Surgeon: Radha Musa MD;  [facility]     EXAM UNDER ANESTHESIA ANUS N/A 03/14/2018    Procedure: EXAM UNDER ANESTHESIA ANUS;  Anal Exam Under Anesthesia With Excision of anal lesion, proctoscopy;  Surgeon: Shabbir Leo MD;  Location: [facility]     EYE SURGERY       GENITOURINARY SURGERY       IR OR ANGIOGRAM  04/06/2021     IRRIGATION AND DEBRIDEMENT GROIN Right 04/24/2021    Procedure: IRRIGATION AND DEBRIDEMENT, INGUINAL REGION, I & D PF RIGHT GROIN;  Surgeon: Raven Lewis MD;  [facility]     IRRIGATION AND DEBRIDEMENT GROIN N/A 04/26/2021    Procedure: IRRIGATION AND DEBRIDEMENT, INGUINAL REGION, PLACEMENT OF VERAFLO WOUND VAC, WOUND WASHOUT,;  Surgeon: Raven Lewis MD;  [facility]     LASER CO2 EXCISE VULVA WIDE LOCAL  07/15/2014    Procedure: LASER CO2 EXCISE VULVA WIDE LOCAL;  Surgeon: Liliana Renteria MD;  [facility]     LASER CO2 VAGINA  07/17/2013    Procedure: LASER CO2 VAGINA;;  Surgeon: Liliana Renteria MD;  [facility]     LASER CO2 VAGINA N/A 09/25/2018    Procedure: LASER CO2 VAGINA;  Exam Under Anesthesia, CO2 Laser Ablation of Upper Vagina and Cervix;  Surgeon: Pati Garcia MD;  [facility]: [facility]     MICROSCOPY ANAL  07/17/2013    Procedure: MICROSCOPY ANAL;  Anal Microscopy,  EUA vagina,Colposcopy Of Vagina And Vulva, Vaginal Biopsies, Omniguide Co2 Laser To Vagina and vulva, Loop Electrosurgical Excision Procedure To Cervix;  Surgeon: Radha Musa MD;  [facility]     MICROSCOPY ANAL  07/15/2014    Procedure: MICROSCOPY ANAL;  Surgeon: Radha Musa MD;  [facility]     PICC DOUBLE LUMEN PLACEMENT Right 04/30/2021    39cm, Basilic vein     TRANSESOPHAGEAL ECHOCARDIOGRAM INTRAOPERATIVE N/A 04/28/2021    Procedure: ECHOCARDIOGRAM, TRANSESOPHAGEAL, INTRAOPERATIVE;  Surgeon: GENERIC ANESTHESIA PROVIDER;   Location: [facility]     VASCULAR SURGERY      Thrombectomy     [facility] NONSPECIFIC PROCEDURE      Thrombectomy     [facility] NONSPECIFIC PROCEDURE   and     Bilater eye surgery - correction for crossed eyes     [facility] NONSPECIFIC PROCEDURE      oopherectomy L     [facility] NONSPECIFIC PROCEDURE  1967    open kidney biopsy - L     [facility] TRANSPLANTATION OF KIDNEY  2004    recipient -- done at [facility]       Family history:  Family History   Problem Relation Age of Onset     Diabetes Father         type 2 diag age,60's     Alcohol/Drug Father      Arthritis Father      Hypertension Father      Lipids Father         high cholesterol     Arthritis Mother      Diabetes Mother      Depression Mother      Heart Disease Mother      Neurologic Disorder Mother      Obesity Mother      Psychotic Disorder Mother      Thyroid Disease Mother      Hypertension Mother      Gynecology Sister         Precancerous cell removal from cervix at age 45     Depression Sister      Allergies Sister      Alcohol/Drug Sister      Neurologic Disorder Sister      Cerebrovascular Disease Paternal Grandmother          of a stroke in her 80's     Diabetes Paternal Grandmother      Alcohol/Drug Son      Colon Polyps Sister      Breast Cancer Niece      Other Cancer Sister         Cervical     Obesity Sister      Depression Sister      Substance Abuse Son      Substance Abuse Sister      Asthma Other      Colon Cancer No family hx of      Crohn's Disease No family hx of      Ulcerative Colitis No family hx of      Melanoma No family hx of      Skin Cancer No family hx of        Medications:  Current Outpatient Medications   Medication Sig Dispense Refill     acetaminophen (TYLENOL) 325 MG tablet Take 2 tablets (650 mg) by mouth every 4 hours as needed for other (For optimal non-opioid multimodal pain management to improve pain control.) 100 tablet 3     aspirin (ASA) 81 MG chewable tablet Take 1 tablet (81 mg) by mouth daily 90 tablet 3     atorvastatin  (LIPITOR) 80 MG tablet Take 1 tablet (80 mg) by mouth daily 60 tablet 4     calcium carbonate 600 mg-vitamin D 400 units (CALTRATE) 600-400 MG-UNIT per tablet Take 1 tablet by mouth 2 times daily 120 tablet 3     clopidogrel (PLAVIX) 75 MG tablet Take 1 tablet (75 mg) by mouth daily 90 tablet 3     fluticasone (FLONASE) 50 MCG/ACT nasal spray Spray 1 spray into both nostrils daily 18.2 mL 3     hydrALAZINE (APRESOLINE) 10 MG tablet Take 1 tablet (10 mg) by mouth 3 times daily 270 tablet 1     isosorbide mononitrate (IMDUR) 60 MG 24 hr tablet Take 1 tablet (60 mg) by mouth daily 60 tablet 4     lisinopril (ZESTRIL) 2.5 MG tablet Take 1 tablet (2.5 mg) by mouth daily 90 tablet 1     metoprolol tartrate (LOPRESSOR) 100 MG tablet Take 1 tablet (100 mg) by mouth 2 times daily 180 tablet 1     mycophenolic acid (GENERIC EQUIVALENT) 360 MG EC tablet Take 1 tablet (360 mg) by mouth 2 times daily 60 tablet 11     Nutritional Supplements (ENSURE CLEAR) LIQD Take 1 Bottle by mouth daily 396 mL 11     pantoprazole (PROTONIX) 40 MG EC tablet Take 1 tablet (40 mg) by mouth every morning (before breakfast) 60 tablet 3     predniSONE (DELTASONE) 5 MG tablet Take 1 tablet (5 mg) by mouth daily 90 tablet 3     torsemide (DEMADEX) 10 MG tablet Take 1 tablet (10mg) once every other day. Additional use as directed by [facility] clinic. 90 tablet 1     mupirocin (BACTROBAN) 2 % external ointment Apply topically 3 times daily (Patient not taking: Reported on 2021) 30 g 0       Allergies:  The patientis allergic to blood transfusion related (informational only), ultracet, and hydrocodone.    Social history:  Social History     Tobacco Use     Smoking status: Former Smoker     Packs/day: 0.30     Years: 35.00     Pack years: 10.50     Types: Cigarettes     Start date: 1967     Quit date: 3/1/2021     Years since quittin.9     Smokeless tobacco: Never Used     Tobacco comment: Couple times a week. 1-2 cigs 1-2x a week.   Substance  "Use Topics     Alcohol use: Not Currently     Alcohol/week: 0.0 standard drinks     Comment: rarely     Marital status: .    Review of Systems:  Nursing Notes:   Chantell Ellis CMA  2/8/2022 11:12 AM  Signed  Chief Complaint   Patient presents with     Follow Up     6 month anoscopy follow up.       Vitals:    02/08/22 1106   BP: (!) 140/91   BP Location: Left arm   Patient Position: Sitting   Cuff Size: Adult Small   Pulse: 70   Temp: 97.5  F (36.4  C)   TempSrc: Oral   SpO2: 99%   Weight: 89 lb 4.8 oz   Height: 5' 1.25\"       Body mass index is 16.74 kg/m .                        Chantell Shay CMA         20 minutes spent on the date of the encounter doing chart review, history and exam, documentation, review of imaging, and further activities as noted above.     Shabbir Leo MD   Professor and Chief  Division of Colon and Rectal Surgery  [facility]      Referring Provider:  No referring provider defined for this encounter.     Primary Care Provider:  Lisa Martinez    This note was created using speech recognition software and may contain unintended word substitutions.  "

## 2022-01-31 DIAGNOSIS — I50.9 CONGESTIVE HEART FAILURE, UNSPECIFIED HF CHRONICITY, UNSPECIFIED HEART FAILURE TYPE (H): ICD-10-CM

## 2022-01-31 DIAGNOSIS — Z94.0 KIDNEY REPLACED BY TRANSPLANT: ICD-10-CM

## 2022-02-08 ENCOUNTER — OFFICE VISIT (OUTPATIENT)
Dept: SURGERY | Facility: CLINIC | Age: 72
End: 2022-02-08
Payer: COMMERCIAL

## 2022-02-08 VITALS
TEMPERATURE: 97.5 F | HEIGHT: 61 IN | WEIGHT: 89.3 LBS | OXYGEN SATURATION: 99 % | HEART RATE: 70 BPM | DIASTOLIC BLOOD PRESSURE: 91 MMHG | SYSTOLIC BLOOD PRESSURE: 140 MMHG | BODY MASS INDEX: 16.86 KG/M2

## 2022-02-08 DIAGNOSIS — C21.1 MALIGNANT NEOPLASM OF ANAL CANAL (H): Primary | ICD-10-CM

## 2022-02-08 PROCEDURE — 99213 OFFICE O/P EST LOW 20 MIN: CPT | Performed by: COLON & RECTAL SURGERY

## 2022-02-08 ASSESSMENT — PAIN SCALES - GENERAL: PAINLEVEL: NO PAIN (0)

## 2022-02-08 ASSESSMENT — MIFFLIN-ST. JEOR: SCORE: 871.4

## 2022-02-08 NOTE — PATIENT INSTRUCTIONS
Follow up:    Please call with any questions or concerns regarding your clinic visit today.    It is a pleasure being involved in your health care.    Contacts post-consultation depending on your need:    Radiology Appointments [phone]    Schedule Clinic Appointments [phone] # 1   M-F 7:30 - 5 pm    [facility] [phone]    Clinic Fax Number [phone]    Surgery Scheduling [phone]    My Chart is available 24 hours a day and is a secure way to access your records and communicate with your care team.  I strongly recommend signing up if you haven't already done so, if you are comfortable with computers.  If you would like to inquire about this or are having problems with My Chart access, you may call [phone] or go online at [email or web address].  Please allow at least 24 hours for a response and extra time on weekends and Holidays.

## 2022-02-08 NOTE — LETTER
2022       RE: Maribel Yang  [address]  [facility] [address]     Dear Colleague,    Thank you for referring your patient, Maribel Yang, to the [facility] [place] [facility]. Please see a copy of my visit note below.    Colon and Rectal Surgery Clinic Note      RE: Maribel Yang  : 1950  JESSEE: 22    Maribel is a 70 year old woman who presents today for follow up of her anal cancer.     HPI:     Maribel has a past medical history of kidney transplant in , lung cancer in , cervical dysplasia, ananorectal condyloma and vulvar intraepithelial neoplasia 2. Dr. Db Urbina excised a patch if high grade perianal dysplasia in the right anterolateral position in . 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 MRI without pelvic or inguinal lymphadenopathy.     She completed chemoradiation on 18 and presents today or follow up.    Most recent CT AP on 21:  1. Large thick-walled fluid collection in the lower abdominal wall  extending from the right groin surgical site and right common femoral  artery along the femoral-femoral bypass graft to the left common  femoral artery. Surgical consultation is recommended.  2. Postprocedural changes of abdominal aortic EVAR with left uni-iliac  graft.     CTA CAP 21:  Impression:  1. Postoperative changes of aortic to left common iliac stent graft.  There is mural thrombus present in the proximal stent graft occupying  less than 50% of the lumen as detailed the body of the report. Minimal  change in size of the aneurysmal sac without evidence for endoleak.   2. Postoperative changes of left to right  femorofemoral bypass with a  pseudoaneurysm at the level of the right common femoral arterial  anastomosis measuring up to 2 cm. This was not visualized on the  comparison vascular ultrasound 4/24/2021 and there is no prior  postprocedural CT with contrast for comparison.  3. Resolved or nearly completely resolved subcutaneous hematoma  adjacent to the bypass graft.  4. Unchanged thoracic aortic aneurysm measuring up to 5 cm in the  distal descending thoracic aorta, also containing mural thrombus which  is not significantly changed compared to 6/22/2021 chest CTA .  5. Aberrant retroesophageal right subclavian artery with a new focal  dissection in the proximal segment.  6. Severe upper lobe and centrilobular predominant emphysematous  change.  7. Unchanged spiculated nodule in the anterior right upper lobe and  scattered sub-6 mm pulmonary nodules as detailed above. Continued  attention on follow-up recommended.  8. Colonic diverticulosis without evidence for acute diverticulitis.  [Access Center: Right common femoral artery pseudoaneurysm]    Maribel had an AAA s/p EVAR with L-R femoral bypass graft on 4/26/21 c/b sepsis 2/2 R femoral groin access site and perigraft abscess, CAD with STEMI s/p LIUDMILA to RCA (4/7/21) and another STEMI post-operatively (4/23/21) attributed to coronary vasospasm, and HFrEF (EF 35-40%) secondary to ICM     I last saw Maribel on 8/10/21 without evidence of recurrence.    Maribel last saw Dr. Jag Rene on 2/21/2020, and she currently follows up routinely with SANDRA Cadet.  She has a repeat CT scan and a follow-up appointment with Angelica scheduled next month.    Interval History: Maribel had a difficult time related to her surgery and complications noted above starting last April.  Her weight is down to the 90 pound range and she has been unable to increase it.  Otherwise, she has no specific complaints.    Physical examination:  Examination was chaperoned by Chantell May  "FELIX Shay    Vitals: BP (!) 140/91 (BP Location: Left arm, Patient Position: Sitting, Cuff Size: Adult Small)   Pulse 70   Temp 97.5  F (36.4  C) (Oral)   Ht 5' 1.25\"   Wt 89 lb 4.8 oz   SpO2 99%   BMI 16.74 kg/m    BMI= Body mass index is 16.74 kg/m .     Maribel looks thin and frail but she is bright, interactive, and her spirits are good..      Groin: No palpable inguinal lymphadenopathy. Bilateral scars well healed. Visual inspection of anus with external skin tags present. No visible lesions, palpation of external perianal area without lesions. ITZEL without palpable scarring or lesions. Anoscopy with normal appearing tissue, no scar visualized      Laboratory data:    Recent Labs   Lab Test 03/01/21  1508 07/22/14  1603 07/22/14  1603   WBC 5.8   < > 8.7   HGB 13.0   < > 14.4      < > 258   CR 1.38*   < > 1.60*   ALBUMIN 3.2*   < > 4.7   BILITOTAL 0.3   < > 0.4   ALKPHOS 132   < > 153*   ALT 21   < > 21   AST 17   < > 24   INR  --   --  0.94    < > = values in this interval not displayed.       Assessment/plan:    No evidence of recurrence on exam today.  Maribel is now almost 4 years out since her treatment.  Repeat anoscopy, ITZEL,  and inguinal node exam in 6 months. Continue to follow with oncology with SANDRA Cadet. Patient's questions were answered to her stated satisfaction and she is in agreement with this plan.    For details of past medical history, surgical history, family history, medications, allergies, and review of systems, please see details below.    Medical history:  Past Medical History:   Diagnosis Date     Abnormal coagulation profile     p [id number] heterozygote      Age-related osteoporosis without current pathological fracture 6/22/2019     Anemia      Antiplatelet or antithrombotic long-term use      ASCUS with positive high risk HPV 2007, 2015    + HPV 56, 54,& 6, colp - TAL III, Leep =TAL II     Basal cell carcinoma      Congestive heart failure, unspecified HF " chronicity, unspecified heart failure type (H) 6/22/2021     Depressive disorder July 2015     Hypertension      Immunosuppressed status (H)     due meds     Kidney replaced by transplant 9/04    Living donor recipient,  Rejection 7/2005     LSIL (low grade squamous intraepithelial lesion) on Pap smear 4/2013    +HPV 33 or 45, 61       PAD (peripheral artery disease) (H)      PONV (postoperative nausea and vomiting)      Squamous cell lung cancer (H)      Thrombosis of leg 1967     Unspecified disorder of kidney and ureter     X-linked dominant Alport's syndrome.       Surgical history:  Past Surgical History:   Procedure Laterality Date     BIOPSY      Kidney, Lung, Breast     BIOPSY ANAL N/A 03/14/2018    Procedure: BIOPSY ANAL;;  Surgeon: Shabbir Leo MD;  Location: [facility] OR     BYPASS GRAFT FEMORAL FEMORAL Bilateral 04/25/2021    Procedure: Axplantation infected graft, femoral to femoral bypass with cadaveric artery, bilateral SATORIUS MUSCLE FLAP CREATION, evacuation of absece, vacuum closure;  Surgeon: Raven Lewis MD;  Location: [facility] OR     COLONOSCOPY       COLONOSCOPY N/A 08/09/2017    Procedure: COMBINED COLONOSCOPY, SINGLE OR MULTIPLE BIOPSY/POLYPECTOMY BY BIOPSY;;  Surgeon: Sushil Hyatt MD;  Location:  GI     COLPOSCOPY,LOOP ELECTRD CERVIX EXCIS  03/11/2008    TAL II     CONIZATION LEEP  07/17/2013    Procedure: CONIZATION LEEP;;  Surgeon: Liliana Renteria MD;  Location: [facility]     CONIZATION LEEP N/A 08/17/2016    Procedure: CONIZATION LEEP;  Surgeon: Liliana Renteria MD;  [facility]: [facility]     CV CORONARY ANGIOGRAM N/A 04/06/2021    Procedure: CV CORONARY ANGIOGRAM;  Surgeon: Sincere Rosas MD;  Location:  HEART CARDIAC CATH LAB     CV CORONARY ANGIOGRAM N/A 04/25/2021    Procedure: Coronary Angiogram;  Surgeon: Sincere Rosas MD;  Location:  HEART CARDIAC CATH LAB     CV PCI STENT DRUG ELUTING N/A 04/06/2021    Procedure: Percutaneous Coronary  Intervention Stent Drug Eluting;  Surgeon: Sincere Rosas MD;  Location: [facility]     ENDOVASCULAR REPAIR ANEURYSM AORTOILIAC Bilateral 04/06/2021    Procedure: Endovascular Abdominal Aortic Aneurysm Repair with Aortouniiliac Device and Femoral-Femoral Artery Bypass with [id number] Artegraft;  Surgeon: Abena Ferrara MD;  Location: [facility]     EXAM UNDER ANESTHESIA ANUS  07/15/2014    Procedure: EXAM UNDER ANESTHESIA ANUS;  Surgeon: Radha Musa MD;  [facility]     EXAM UNDER ANESTHESIA ANUS N/A 03/14/2018    Procedure: EXAM UNDER ANESTHESIA ANUS;  Anal Exam Under Anesthesia With Excision of anal lesion, proctoscopy;  Surgeon: Shabbir Leo MD;  Location: [facility] OR     EYE SURGERY       GENITOURINARY SURGERY       IR OR ANGIOGRAM  04/06/2021     IRRIGATION AND DEBRIDEMENT GROIN Right 04/24/2021    Procedure: IRRIGATION AND DEBRIDEMENT, INGUINAL REGION, I & D PF RIGHT GROIN;  Surgeon: Raven Lewis MD;  [facility]     IRRIGATION AND DEBRIDEMENT GROIN N/A 04/26/2021    Procedure: IRRIGATION AND DEBRIDEMENT, INGUINAL REGION, PLACEMENT OF VERAFLO WOUND VAC, WOUND WASHOUT,;  Surgeon: Raven Lewis MD;  Location: [facility]     LASER CO2 EXCISE VULVA WIDE LOCAL  07/15/2014    Procedure: LASER CO2 EXCISE VULVA WIDE LOCAL;  Surgeon: Liliana Renteria MD;  [facility]     LASER CO2 VAGINA  07/17/2013    Procedure: LASER CO2 VAGINA;;  Surgeon: Liliana Renteria MD;  [facility]: [facility]     LASER CO2 VAGINA N/A 09/25/2018    Procedure: LASER CO2 VAGINA;  Exam Under Anesthesia, CO2 Laser Ablation of Upper Vagina and Cervix;  Surgeon: Pati Garcia MD;  Location: [facility]     MICROSCOPY ANAL  07/17/2013    Procedure: MICROSCOPY ANAL;  Anal Microscopy,  EUA vagina,Colposcopy Of Vagina And Vulva, Vaginal Biopsies, Omniguide Co2 Laser To Vagina and vulva, Loop Electrosurgical Excision Procedure To Cervix;  Surgeon: Radah Musa MD;  Location: [facility]     MICROSCOPY ANAL   07/15/2014    Procedure: MICROSCOPY ANAL;  Surgeon: Radha Musa MD;  Location: [facility]     PICC DOUBLE LUMEN PLACEMENT Right 2021    39cm, Basilic vein     TRANSESOPHAGEAL ECHOCARDIOGRAM INTRAOPERATIVE N/A 2021    Procedure: ECHOCARDIOGRAM, TRANSESOPHAGEAL, INTRAOPERATIVE;  Surgeon: GENERIC ANESTHESIA PROVIDER;  Location: [facility] OR     VASCULAR SURGERY      Thrombectomy     ZZC NONSPECIFIC PROCEDURE      Thrombectomy     ZC NONSPECIFIC PROCEDURE   and     Bilater eye surgery - correction for crossed eyes     Z NONSPECIFIC PROCEDURE      oopherectomy L     [facility] NONSPECIFIC PROCEDURE      open kidney biopsy - L     [facility] TRANSPLANTATION OF KIDNEY  2004    recipient [facility]        Family history:  Family History   Problem Relation Age of Onset     Diabetes Father         type 2 diag age,60's     Alcohol/Drug Father      Arthritis Father      Hypertension Father      Lipids Father         high cholesterol     Arthritis Mother      Diabetes Mother      Depression Mother      Heart Disease Mother      Neurologic Disorder Mother      Obesity Mother      Psychotic Disorder Mother      Thyroid Disease Mother      Hypertension Mother      Gynecology Sister         Precancerous cell removal from cervix at age 45     Depression Sister      Allergies Sister      Alcohol/Drug Sister      Neurologic Disorder Sister      Cerebrovascular Disease Paternal Grandmother          of a stroke in her 80's     Diabetes Paternal Grandmother      Alcohol/Drug Son      Colon Polyps Sister      Breast Cancer Niece      Other Cancer Sister         Cervical     Obesity Sister      Depression Sister      Substance Abuse Son      Substance Abuse Sister      Asthma Other      Colon Cancer No family hx of      Crohn's Disease No family hx of      Ulcerative Colitis No family hx of      Melanoma No family hx of      Skin Cancer No family hx of        Medications:  Current Outpatient Medications    Medication Sig Dispense Refill     acetaminophen (TYLENOL) 325 MG tablet Take 2 tablets (650 mg) by mouth every 4 hours as needed for other (For optimal non-opioid multimodal pain management to improve pain control.) 100 tablet 3     aspirin (ASA) 81 MG chewable tablet Take 1 tablet (81 mg) by mouth daily 90 tablet 3     atorvastatin (LIPITOR) 80 MG tablet Take 1 tablet (80 mg) by mouth daily 60 tablet 4     calcium carbonate 600 mg-vitamin D 400 units (CALTRATE) 600-400 MG-UNIT per tablet Take 1 tablet by mouth 2 times daily 120 tablet 3     clopidogrel (PLAVIX) 75 MG tablet Take 1 tablet (75 mg) by mouth daily 90 tablet 3     fluticasone (FLONASE) 50 MCG/ACT nasal spray Spray 1 spray into both nostrils daily 18.2 mL 3     hydrALAZINE (APRESOLINE) 10 MG tablet Take 1 tablet (10 mg) by mouth 3 times daily 270 tablet 1     isosorbide mononitrate (IMDUR) 60 MG 24 hr tablet Take 1 tablet (60 mg) by mouth daily 60 tablet 4     lisinopril (ZESTRIL) 2.5 MG tablet Take 1 tablet (2.5 mg) by mouth daily 90 tablet 1     metoprolol tartrate (LOPRESSOR) 100 MG tablet Take 1 tablet (100 mg) by mouth 2 times daily 180 tablet 1     mycophenolic acid (GENERIC EQUIVALENT) 360 MG EC tablet Take 1 tablet (360 mg) by mouth 2 times daily 60 tablet 11     Nutritional Supplements (ENSURE CLEAR) LIQD Take 1 Bottle by mouth daily 396 mL 11     pantoprazole (PROTONIX) 40 MG EC tablet Take 1 tablet (40 mg) by mouth every morning (before breakfast) 60 tablet 3     predniSONE (DELTASONE) 5 MG tablet Take 1 tablet (5 mg) by mouth daily 90 tablet 3     torsemide (DEMADEX) 10 MG tablet Take 1 tablet (10mg) once every other day. Additional use as directed by [facility] clinic. 90 tablet 1     mupirocin (BACTROBAN) 2 % external ointment Apply topically 3 times daily (Patient not taking: Reported on 12/23/2021) 30 g 0       Allergies:  The patientis allergic to blood transfusion related (informational only), ultracet, and hydrocodone.    Social  "history:  Social History     Tobacco Use     Smoking status: Former Smoker     Packs/day: 0.30     Years: 35.00     Pack years: 10.50     Types: Cigarettes     Start date: 1967     Quit date: 3/1/2021     Years since quittin.9     Smokeless tobacco: Never Used     Tobacco comment: Couple times a week. 1-2 cigs 1-2x a week.   Substance Use Topics     Alcohol use: Not Currently     Alcohol/week: 0.0 standard drinks     Comment: rarely     Marital status: .    Review of Systems:  Nursing Notes:   Chantell Ellis CMA  2022 11:12 AM  Signed  Chief Complaint   Patient presents with     Follow Up     6 month anoscopy follow up.       Vitals:    22 1106   BP: (!) 140/91   BP Location: Left arm   Patient Position: Sitting   Cuff Size: Adult Small   Pulse: 70   Temp: 97.5  F (36.4  C)   TempSrc: Oral   SpO2: 99%   Weight: 89 lb 4.8 oz   Height: 5' 1.25\"       Body mass index is 16.74 kg/m .                        Chantell Shay CMA         20 minutes spent on the date of the encounter doing chart review, history and exam, documentation, review of imaging, and further activities as noted above.     Shabbir Leo MD   Professor and Chief  Division of Colon and Rectal Surgery  [facility]      Referring Provider:  No referring provider defined for this encounter.     Primary Care Provider:  Lisa Martinez    This note was created using speech recognition software and may contain unintended word substitutions.      Again, thank you for allowing me to participate in the care of your patient.      Sincerely,    Shabbir Leo MD      "

## 2022-02-19 ENCOUNTER — HEALTH MAINTENANCE LETTER (OUTPATIENT)
Age: 72
End: 2022-02-19

## 2022-02-25 RX ORDER — TORSEMIDE 10 MG/1
TABLET ORAL
Qty: 90 TABLET | Refills: 1 | OUTPATIENT
Start: 2022-02-25

## 2022-02-28 DIAGNOSIS — I50.9 CONGESTIVE HEART FAILURE, UNSPECIFIED HF CHRONICITY, UNSPECIFIED HEART FAILURE TYPE (H): ICD-10-CM

## 2022-02-28 DIAGNOSIS — D84.9 IMMUNOSUPPRESSION (H): Primary | ICD-10-CM

## 2022-02-28 NOTE — TELEPHONE ENCOUNTER
Routing refill request to provider for review/approval because:  Drug not on the [facility] refill protocol   Drug not active on patient's medication list

## 2022-03-01 ENCOUNTER — LAB (OUTPATIENT)
Dept: LAB | Facility: CLINIC | Age: 72
End: 2022-03-01

## 2022-03-01 ENCOUNTER — ANCILLARY PROCEDURE (OUTPATIENT)
Dept: CT IMAGING | Facility: CLINIC | Age: 72
End: 2022-03-01
Attending: PHYSICIAN ASSISTANT
Payer: COMMERCIAL

## 2022-03-01 DIAGNOSIS — D47.2 MGUS (MONOCLONAL GAMMOPATHY OF UNKNOWN SIGNIFICANCE): ICD-10-CM

## 2022-03-01 DIAGNOSIS — C34.90 SQUAMOUS CELL CARCINOMA OF LUNG, UNSPECIFIED LATERALITY (H): ICD-10-CM

## 2022-03-01 LAB
ALBUMIN SERPL-MCNC: 3.2 G/DL (ref 3.4–5)
ALP SERPL-CCNC: 150 U/L (ref 40–150)
ALT SERPL W P-5'-P-CCNC: 40 U/L (ref 0–50)
ANION GAP SERPL CALCULATED.3IONS-SCNC: 7 MMOL/L (ref 3–14)
AST SERPL W P-5'-P-CCNC: 20 U/L (ref 0–45)
BASOPHILS # BLD AUTO: 0 10E3/UL (ref 0–0.2)
BASOPHILS NFR BLD AUTO: 0 %
BILIRUB SERPL-MCNC: 0.5 MG/DL (ref 0.2–1.3)
BUN SERPL-MCNC: 34 MG/DL (ref 7–30)
CALCIUM SERPL-MCNC: 9.2 MG/DL (ref 8.5–10.1)
CHLORIDE BLD-SCNC: 103 MMOL/L (ref 94–109)
CO2 SERPL-SCNC: 28 MMOL/L (ref 20–32)
CREAT SERPL-MCNC: 1.56 MG/DL (ref 0.52–1.04)
EOSINOPHIL # BLD AUTO: 0.1 10E3/UL (ref 0–0.7)
EOSINOPHIL NFR BLD AUTO: 1 %
ERYTHROCYTE [DISTWIDTH] IN BLOOD BY AUTOMATED COUNT: 16.6 % (ref 10–15)
GFR SERPL CREATININE-BSD FRML MDRD: 36 ML/MIN/1.73M2
GLUCOSE BLD-MCNC: 98 MG/DL (ref 70–99)
HCT VFR BLD AUTO: 40 % (ref 35–47)
HGB BLD-MCNC: 12.4 G/DL (ref 11.7–15.7)
IMM GRANULOCYTES # BLD: 0.1 10E3/UL
IMM GRANULOCYTES NFR BLD: 1 %
LYMPHOCYTES # BLD AUTO: 0.5 10E3/UL (ref 0.8–5.3)
LYMPHOCYTES NFR BLD AUTO: 5 %
MCH RBC QN AUTO: 27.7 PG (ref 26.5–33)
MCHC RBC AUTO-ENTMCNC: 31 G/DL (ref 31.5–36.5)
MCV RBC AUTO: 89 FL (ref 78–100)
MONOCYTES # BLD AUTO: 0.8 10E3/UL (ref 0–1.3)
MONOCYTES NFR BLD AUTO: 8 %
NEUTROPHILS # BLD AUTO: 8.3 10E3/UL (ref 1.6–8.3)
NEUTROPHILS NFR BLD AUTO: 85 %
NRBC # BLD AUTO: 0 10E3/UL
NRBC BLD AUTO-RTO: 0 /100
PLATELET # BLD AUTO: 315 10E3/UL (ref 150–450)
POTASSIUM BLD-SCNC: 4.2 MMOL/L (ref 3.4–5.3)
PROT SERPL-MCNC: 7.6 G/DL (ref 6.8–8.8)
RBC # BLD AUTO: 4.48 10E6/UL (ref 3.8–5.2)
SODIUM SERPL-SCNC: 138 MMOL/L (ref 133–144)
TOTAL PROTEIN SERUM FOR ELP: 6.8 G/DL (ref 6.8–8.8)
WBC # BLD AUTO: 9.7 10E3/UL (ref 4–11)

## 2022-03-01 PROCEDURE — 36415 COLL VENOUS BLD VENIPUNCTURE: CPT | Performed by: PATHOLOGY

## 2022-03-01 PROCEDURE — 84155 ASSAY OF PROTEIN SERUM: CPT | Mod: 90 | Performed by: PATHOLOGY

## 2022-03-01 PROCEDURE — 99000 SPECIMEN HANDLING OFFICE-LAB: CPT | Performed by: PATHOLOGY

## 2022-03-01 PROCEDURE — 71250 CT THORAX DX C-: CPT | Mod: GC | Performed by: RADIOLOGY

## 2022-03-01 PROCEDURE — 82784 ASSAY IGA/IGD/IGG/IGM EACH: CPT | Mod: 90 | Performed by: PATHOLOGY

## 2022-03-01 PROCEDURE — 84165 PROTEIN E-PHORESIS SERUM: CPT | Performed by: STUDENT IN AN ORGANIZED HEALTH CARE EDUCATION/TRAINING PROGRAM

## 2022-03-01 PROCEDURE — 83521 IG LIGHT CHAINS FREE EACH: CPT | Mod: 90 | Performed by: PATHOLOGY

## 2022-03-01 PROCEDURE — 85025 COMPLETE CBC W/AUTO DIFF WBC: CPT | Performed by: PATHOLOGY

## 2022-03-01 PROCEDURE — 80053 COMPREHEN METABOLIC PANEL: CPT | Performed by: PATHOLOGY

## 2022-03-01 RX ORDER — LACTOBACILLUS RHAMNOSUS GG 10B CELL
CAPSULE ORAL
Qty: 90 CAPSULE | Refills: 3 | Status: SHIPPED | OUTPATIENT
Start: 2022-03-01 | End: 2023-01-01

## 2022-03-02 LAB
IGA SERPL-MCNC: 136 MG/DL (ref 84–499)
IGG SERPL-MCNC: 1112 MG/DL (ref 610–1616)
IGM SERPL-MCNC: 118 MG/DL (ref 35–242)
KAPPA LC FREE SER-MCNC: 3.95 MG/DL (ref 0.33–1.94)
KAPPA LC FREE/LAMBDA FREE SER NEPH: 1.25 {RATIO} (ref 0.26–1.65)
LAMBDA LC FREE SERPL-MCNC: 3.16 MG/DL (ref 0.57–2.63)

## 2022-03-02 RX ORDER — METOPROLOL TARTRATE 100 MG
100 TABLET ORAL 2 TIMES DAILY
Qty: 180 TABLET | Refills: 1 | Status: SHIPPED | OUTPATIENT
Start: 2022-03-02 | End: 2022-09-07

## 2022-03-02 NOTE — TELEPHONE ENCOUNTER
Last Clinic Visit:  12/29/21  NV: 3/25/22  BPs  ADDRESSED  02/08/22 (!) 140/91   12/29/21 (!) 181/116

## 2022-03-03 DIAGNOSIS — T81.49XA WOUND INFECTION AFTER SURGERY: ICD-10-CM

## 2022-03-03 DIAGNOSIS — Z94.0 KIDNEY REPLACED BY TRANSPLANT: ICD-10-CM

## 2022-03-03 LAB
ALBUMIN SERPL ELPH-MCNC: 3.5 G/DL (ref 3.7–5.1)
ALPHA1 GLOB SERPL ELPH-MCNC: 0.5 G/DL (ref 0.2–0.4)
ALPHA2 GLOB SERPL ELPH-MCNC: 1 G/DL (ref 0.5–0.9)
B-GLOBULIN SERPL ELPH-MCNC: 0.7 G/DL (ref 0.6–1)
GAMMA GLOB SERPL ELPH-MCNC: 1 G/DL (ref 0.7–1.6)
M PROTEIN SERPL ELPH-MCNC: 0.1 G/DL
PROT PATTERN SERPL ELPH-IMP: ABNORMAL

## 2022-03-07 NOTE — TELEPHONE ENCOUNTER
Routing refill request to provider for review/approval because:  Last Rx's from outside provider  Dannielle DSOUZA RN

## 2022-03-08 RX ORDER — PANTOPRAZOLE SODIUM 40 MG/1
40 TABLET, DELAYED RELEASE ORAL
Qty: 60 TABLET | Refills: 3 | Status: SHIPPED | OUTPATIENT
Start: 2022-03-08 | End: 2022-03-10

## 2022-03-09 DIAGNOSIS — T81.49XA WOUND INFECTION AFTER SURGERY: ICD-10-CM

## 2022-03-10 RX ORDER — PANTOPRAZOLE SODIUM 40 MG/1
40 TABLET, DELAYED RELEASE ORAL
Qty: 60 TABLET | Refills: 3 | Status: SHIPPED | OUTPATIENT
Start: 2022-03-10 | End: 2022-12-26

## 2022-03-11 NOTE — PROGRESS NOTES
"[facility]    Hematology/Oncology Established Patient Note  Mar 29, 2022      Reason for Follow-up: anal canal carcinoma      HISTORY OF PRESENT ILLNESS: Maribel Yang is a 71 year old female with PMHx of kidney transplant in 2004, lung cancer in 2014 (SCC of the RUL, stage [id number] (IA) s/p SBRT by Dr. Mckeon), HPV, PAD, who presents with anal canal carcinoma.   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 Renteria.  She has undergone high resolution anoscopy with biopsy, which showed superficially invasive squamous cell carcinoma.  On 3/14/18, she underwent exam under anesthesia with full thickness excision of superficially invasive anal cancer by Dr. Leo.  Pathology showed poorly differentiated invasive squamous cell carcinoma, tumor size 7 mm, tumor invades into anal sphincter muscle, resection margins negative for carcinoma and high-grade dysplasia.  Invasive tumor is 1 mm from closest margin.  There is background high grade squamous intraepithelial lesion (AIN 3).  It was staged pT1.  She has MRI pelvis on 2/28/18 that showed no pelvic or inguinal lymphadenopathy.    Patient was discussed at tumor conference, and consensus was that no further surgery was feasible, and recommendation is for chemoradiation, but with Xeloda without mitomycin, considering her co-morbidities and history of renal transplant on immunosuppression.    She started chemoradiation on 4/30/18 and completed it on 6/11/18. She presents in routine surveillance.       INTERIM HISTORY: Maribel has been feeling well. Feels \"the same\". She has slowly been recovering from aneurysm repair from last year. She lost 10 pounds after surgery and has been working to regain this. She is eating small portions frequently and is drinking an Ensure daily. She has continued thigh numbness since surgery.     She has been taking culturelle and benefiber once daily " and has had resolution of diarrhea. No recent abdominal pain or blood in stool.     No new or different persistent pain. Her energy level is stable. She had a cold a few weeks ago and is recovering from this.     REVIEW OF SYSTEMS:   14 point ROS was reviewed and is negative other than as noted above in HPI.       HOME MEDICATIONS:  Current Outpatient Medications   Medication Sig Dispense Refill     acetaminophen (TYLENOL) 325 MG tablet Take 2 tablets (650 mg) by mouth every 4 hours as needed for other (For optimal non-opioid multimodal pain management to improve pain control.) 100 tablet 3     aspirin (ASA) 81 MG chewable tablet Take 1 tablet (81 mg) by mouth daily 90 tablet 3     atorvastatin (LIPITOR) 80 MG tablet Take 1 tablet (80 mg) by mouth daily 60 tablet 4     calcium carbonate 600 mg-vitamin D 400 units (CALTRATE) 600-400 MG-UNIT per tablet Take 1 tablet by mouth 2 times daily 120 tablet 3     clopidogrel (PLAVIX) 75 MG tablet Take 1 tablet (75 mg) by mouth daily 90 tablet 3     fluticasone (FLONASE) 50 MCG/ACT nasal spray Spray 1 spray into both nostrils daily 18.2 mL 3     FLUZONE HIGH-DOSE QUADRIVALENT 0.7 ML THIERRY injection        hydrALAZINE (APRESOLINE) 10 MG tablet Take 1 tablet (10 mg) by mouth 3 times daily 270 tablet 1     isosorbide mononitrate (IMDUR) 60 MG 24 hr tablet Take 1 tablet (60 mg) by mouth daily 60 tablet 4     Lactobacillus-Inulin ([facility] DIGESTIVE [facility]) CAPS TAKE ONE CAPSULE BY MOUTH ONCE DAILY (DUE FOR PHYSICAL IN MARCH) 90 capsule 3     lisinopril (ZESTRIL) 2.5 MG tablet Take 1 tablet (2.5 mg) by mouth daily 90 tablet 1     metoprolol tartrate (LOPRESSOR) 100 MG tablet Take 1 tablet (100 mg) by mouth 2 times daily 180 tablet 1     mupirocin (BACTROBAN) 2 % external ointment Apply topically 3 times daily (Patient not taking: Reported on 12/23/2021) 30 g 0     mycophenolic acid (GENERIC EQUIVALENT) 360 MG EC tablet Take 1 tablet (360 mg) by mouth 2 times daily 60 tablet 11      Nutritional Supplements (ENSURE CLEAR) LIQD Take 1 Bottle by mouth daily 396 mL 11     pantoprazole (PROTONIX) 40 MG EC tablet Take 1 tablet (40 mg) by mouth every morning (before breakfast) 60 tablet 3     predniSONE (DELTASONE) 5 MG tablet Take 1 tablet (5 mg) by mouth daily 90 tablet 3     torsemide (DEMADEX) 10 MG tablet Take 1 tablet (10mg) once every other day. Additional use as directed by [facility]. 90 tablet 1         ALLERGIES:  Allergies   Allergen Reactions     Blood Transfusion Related (Informational Only) Other (See Comments)     Patient has a history of a clinically significant antibody against RBC antigens.  A delay in compatible RBCs may occur.     Ultracet Nausea and Vomiting and Hives     Hydrocodone Nausea and Vomiting and Hives         PHYSICAL EXAM:  BP (!) 140/82   Pulse 76   Temp 97.5  F (36.4  C) (Oral)   Wt 41.3 kg (91 lb)   SpO2 95%   BMI 17.05 kg/m    Wt Readings from Last 4 Encounters:   03/29/22 41.3 kg (91 lb)   02/08/22 40.5 kg (89 lb 4.8 oz)   12/29/21 41.7 kg (92 lb)   12/28/21 41.3 kg (91 lb)     General: Alert, oriented, pleasant, NAD. Very thin.   HEENT: Normocephalic, atraumatic, no icterus.   Neck: No cervical or supraclavicular LAD.  Axillary: No LAD  Lungs: CTA bilaterally, normal work of breathing  Cardiac: RRR, quiet   Abdomen: Soft, nontender, nondistended. Normoactive bowel sounds. No hepatosplenomegaly, masses  Neuro: CNII-XII grossly intact  Extremities: No pedal edema        Labs:    3/1/2022 13:58   Sodium 138   Potassium 4.2   Chloride 103   Carbon Dioxide 28   Urea Nitrogen 34 (H)   Creatinine 1.56 (H)   GFR Estimate 36 (L)   Calcium 9.2   Anion Gap 7   Albumin 3.2 (L)   Protein Total 7.6   Bilirubin Total 0.5   Alkaline Phosphatase 150   ALT 40   AST 20   Glucose 98   WBC 9.7   Hemoglobin 12.4   Hematocrit 40.0   Platelet Count 315   RBC Count 4.48   MCV 89   MCH 27.7   MCHC 31.0 (L)   RDW 16.6 (H)   % Neutrophils 85   % Lymphocytes 5   % Monocytes 8   %  Eosinophils 1   % Basophils 0   Absolute Basophils 0.0   Absolute Eosinophils 0.1   Absolute Immature Granulocytes 0.1   Absolute Lymphocytes 0.5 (L)   Absolute Monocytes 0.8   % Immature Granulocytes 1   Absolute Neutrophils 8.3   Absolute NRBCs 0.0   NRBCs per 100 WBC 0      6/12/2019 18:10 8/19/2019 10:44 2/13/2020 12:28 3/1/2021 15:08 3/1/2022 13:58   IGA  119  130 136   IGG  1,030  1,100 1,112   IGM  155  138 118   Immunofixation ELP  (Note)      Bernard Free Lt Chain  3.15 (H) 3.15 (H) 3.25 (H) 3.95 (H)   Kappa Lambda Ratio  1.46 1.23 1.29 1.25   Lambda Free Lt Chain  2.16 2.57 2.52 3.16 (H)   Monoclonal Peak 0.1 (H) 0.1 (H) 0.1 (H) 0.1 (H) 0.1 (H)   Total Protein Serum for ELP     6.8         Imaging: CT Chest low dose 3/1  Impression:   1. Similar appearance of right upper lobe spiculated nodule measuring  to 27 mm status post SBRT. Appears relatively stable going back to  8/29/2016.  2. Remainder of some 6 mm pulmonary nodules are unchanged.  3. Similar appearance of descending thoracic aortic aneurysm measuring  up to 56 mm, better characterized on CT chest abdomen and pelvis  8/25/2021 due to the presence of intravenous contrast.  4. 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  5. Trace pericardial effusion.       ASSESSMENT/PLAN:  Maribel Yang is a 71 year old female with:    1) Anal canal carcinoma: history of HPV; [id number]. S/p excional biopsy on 3/14/18, with pathology showing poorly differentiated invasive squamous cell carcinoma, tumor size 7 mm, tumor invades into anal sphincter muscle, resection margins negative for carcinoma and high-grade dysplasia however Invasive tumor is 1 mm from closest margin. It was recommended to proceed with chemoradiation with Xeloda which she completed 6/2018.      She has been followed via surveillance since then.     Per NCCN guidelines, even with T1 disease, she should have ITZEL every 3-6 months for 5 years, inguinal node palpation every  3-6 months for 5 years, anoscopy every 6-12 months for 3 years, and imaging annually for 3 years. Imaging was completed last year.      She was given a treatment plan summary detailing her treatment and long term effects of treatment in August 2020.     -Anoscopy, ITZEL, and inguinal exam UTD. Next due 8/2022.   -Follow-up with medical oncology/survivorship annual through year 5. Through 6/2023.     2) Risk of long-term radiation side effects:   -Chronic bowel dysmotility including diarrhea, clustering, urgency, frequency, and incontinence. Antidiarrheals, fiber, and pelvic floor therapy should be considered.   -Urogenital dysfunction including vaginal dryness, urinary urgency, frequency, or incontinence. Uro-gyn referral if urinary symptoms are present otherwise Replens for vaginal dryness.   -Risk of pelvic fractures/loss of bone density from pelvic XRT. Consider DEXA scans through PCP.     3) History of lung cancer in 2014: SCC of the RUL, stage [id number] (IA) s/p SBRT.   -Last chest imaging a few days ago shows stable changes.  -NCCN guidelines recommend annual low-dose non-contrast chest CT. She had this done last 3/2022 which which was stable.     4) Alport's disease s/p renal transplant in 2004: followed by Dr. See.    -on immunosuppression, as per nephrology. Cr has been stable     5) MGUS: Had serum light chains and SPEP checked a few weeks ago which are overall stable. Light chains are increased both lambda and kappa which could be more from renal dysfunction. No anemia, hypercalcemia, or other cytopenias. Should be followed annually.     6) Cervical and vaginal dysplasia:underwent CO2 laser ablation procedure on 9/25/18 by Dr. Garcia. Should have regular PAPs/exam through GYN or PCP. Last PAP and colposcopy done 9/2020. Overdue for follow-up.     7) History of basal and squamous cell carcinoma of skin: Continued follow-up with Dermatology. Overdue for follow-up.     8) Chronic systolic heart failure  secondary to ICM has had LIUDMILA to RCA. Hx of PAD with EVAR 4/2021. Follows with [facility]. She has continued thigh numbness since surgery. I asked her to follow-up with vascular surgery about this.     8) Routine health maintenance/wellness guidelines:   -Continue to undergo regular preventative health screening, cancer screening, and immunizations with PCP.   -Maintain a healthy body weight throughout life; encouraged her to try plant based protein powder to help gain weight.   -Adhere to a healthy diet which is plant-based.   -Exercise regularly, ideally 30 minutes of moderate intensity exercise most days of the week.   -Limit alcohol consumption. No more than 1 drink per day for women.   -Tobacco cessation     Greater than 40 minutes was spent with this patient with greater than 20 minutes spent in counseling and coordination of care.      Angelica Ribeiro PA-C

## 2022-03-21 DIAGNOSIS — I50.9 CONGESTIVE HEART FAILURE, UNSPECIFIED HF CHRONICITY, UNSPECIFIED HEART FAILURE TYPE (H): Primary | ICD-10-CM

## 2022-03-25 ENCOUNTER — VIRTUAL VISIT (OUTPATIENT)
Dept: CARDIOLOGY | Facility: CLINIC | Age: 72
End: 2022-03-25
Attending: NURSE PRACTITIONER
Payer: COMMERCIAL

## 2022-03-25 DIAGNOSIS — I50.9 CONGESTIVE HEART FAILURE, UNSPECIFIED HF CHRONICITY, UNSPECIFIED HEART FAILURE TYPE (H): Primary | ICD-10-CM

## 2022-03-25 PROCEDURE — 99214 OFFICE O/P EST MOD 30 MIN: CPT | Mod: 95 | Performed by: NURSE PRACTITIONER

## 2022-03-25 NOTE — PROGRESS NOTES
Maribel Yang  is being evaluated via a billable video visit.      How would you like to obtain your AVS? [email or web address]  For the video visit, send the invitation by:   Pt connected via [employer]  Will anyone else be joining your video visit? AYE Gorman/FELIX
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
## 2022-03-25 NOTE — NURSING NOTE
Chief Complaint   Patient presents with     Follow Up     [facility], no questions while rooming, no other vitals to report for todays visit     Patient denies any changes since echeck-in regarding medication and allergies and states all information entered during echeck-in remains accurate.    Richard Myers, AYE/CMA

## 2022-03-29 ENCOUNTER — ONCOLOGY VISIT (OUTPATIENT)
Dept: ONCOLOGY | Facility: CLINIC | Age: 72
End: 2022-03-29
Attending: PHYSICIAN ASSISTANT
Payer: COMMERCIAL

## 2022-03-29 VITALS
SYSTOLIC BLOOD PRESSURE: 140 MMHG | HEART RATE: 76 BPM | OXYGEN SATURATION: 95 % | TEMPERATURE: 97.5 F | DIASTOLIC BLOOD PRESSURE: 82 MMHG | WEIGHT: 91 LBS | BODY MASS INDEX: 17.05 KG/M2

## 2022-03-29 DIAGNOSIS — D47.2 MGUS (MONOCLONAL GAMMOPATHY OF UNKNOWN SIGNIFICANCE): ICD-10-CM

## 2022-03-29 DIAGNOSIS — C21.1 MALIGNANT NEOPLASM OF ANAL CANAL (H): Primary | ICD-10-CM

## 2022-03-29 DIAGNOSIS — C34.90 SQUAMOUS CELL CARCINOMA OF LUNG, UNSPECIFIED LATERALITY (H): ICD-10-CM

## 2022-03-29 PROCEDURE — 99215 OFFICE O/P EST HI 40 MIN: CPT | Performed by: PHYSICIAN ASSISTANT

## 2022-03-29 PROCEDURE — G0463 HOSPITAL OUTPT CLINIC VISIT: HCPCS

## 2022-03-29 RX ORDER — INFLUENZA A VIRUS A/MICHIGAN/45/2015 X-275 (H1N1) ANTIGEN (FORMALDEHYDE INACTIVATED), INFLUENZA A VIRUS A/SINGAPORE/INFIMH-16-0019/2016 IVR-186 (H3N2) ANTIGEN (FORMALDEHYDE INACTIVATED), INFLUENZA B VIRUS B/PHUKET/3073/2013 ANTIGEN (FORMALDEHYDE INACTIVATED), AND INFLUENZA B VIRUS B/MARYLAND/15/2016 BX-69A ANTIGEN (FORMALDEHYDE INACTIVATED) 60; 60; 60; 60 UG/.7ML; UG/.7ML; UG/.7ML; UG/.7ML
INJECTION, SUSPENSION INTRAMUSCULAR
COMMUNITY
Start: 2021-12-29 | End: 2022-05-03

## 2022-03-29 ASSESSMENT — PAIN SCALES - GENERAL: PAINLEVEL: NO PAIN (0)

## 2022-03-29 NOTE — NURSING NOTE
"Oncology Rooming Note    March 29, 2022 1:28 PM   Maribel Yang is a 71 year old female who presents for:    Chief Complaint   Patient presents with     Oncology Clinic Visit     rtn for anal cancer     Initial Vitals: BP (!) 140/82   Pulse 76   Temp 97.5  F (36.4  C) (Oral)   Wt 41.3 kg (91 lb)   SpO2 95%   BMI 17.05 kg/m   Estimated body mass index is 17.05 kg/m  as calculated from the following:    Height as of 2/8/22: 1.556 m (5' 1.25\").    Weight as of this encounter: 41.3 kg (91 lb). Body surface area is 1.34 meters squared.  No Pain (0) Comment: Data Unavailable   No LMP recorded. Patient is postmenopausal.  Allergies reviewed: Yes  Medications reviewed: Yes    Medications: Medication refills not needed today.  Pharmacy name entered into EPIC:    [facility]  [facility] - [place], [place] - [address] [facility] [place] - [address] [place] [address] [address]  [id number] - [place], [place] - [address] [id number] - [place], [place] - [address] [place], [place] [address]E AT [address] [place] [address] & [facility]    Clinical concerns: none       Sera Fontaine, EMT            "

## 2022-03-29 NOTE — LETTER
"    3/29/2022         RE: Maribel Yang  [address]  [facility] [address]      [facility]    Hematology/Oncology Established Patient Note  Mar 29, 2022      Reason for Follow-up: anal canal carcinoma      HISTORY OF PRESENT ILLNESS: Maribel Yang is a 71 year old female with PMHx of kidney transplant in 2004, lung cancer in 2014 (SCC of the RUL, stage [id number] ([place]) s/p SBRT by Dr. Mckeon), HPV, PAD, who presents with anal canal carcinoma.   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 Renteria.  She has undergone high resolution anoscopy with biopsy, which showed superficially invasive squamous cell carcinoma.  On 3/14/18, she underwent exam under anesthesia with full thickness excision of superficially invasive anal cancer by Dr. Leo.  Pathology showed poorly differentiated invasive squamous cell carcinoma, tumor size 7 mm, tumor invades into anal sphincter muscle, resection margins negative for carcinoma and high-grade dysplasia.  Invasive tumor is 1 mm from closest margin.  There is background high grade squamous intraepithelial lesion (AIN 3).  It was staged pT1.  She has MRI pelvis on 2/28/18 that showed no pelvic or inguinal lymphadenopathy.    Patient was discussed at tumor conference, and consensus was that no further surgery was feasible, and recommendation is for chemoradiation, but with Xeloda without mitomycin, considering her co-morbidities and history of renal transplant on immunosuppression.    She started chemoradiation on 4/30/18 and completed it on 6/11/18. She presents in routine surveillance.       INTERIM HISTORY: Maribel has been feeling well. Feels \"the same\". She has slowly been recovering from aneurysm repair from last year. She lost 10 pounds after surgery and has been working to regain this. She is eating small portions frequently and is drinking an Ensure daily. She has " continued thigh numbness since surgery.     She has been taking culturelle and benefiber once daily and has had resolution of diarrhea. No recent abdominal pain or blood in stool.     No new or different persistent pain. Her energy level is stable. She had a cold a few weeks ago and is recovering from this.     REVIEW OF SYSTEMS:   14 point ROS was reviewed and is negative other than as noted above in HPI.       HOME MEDICATIONS:  Current Outpatient Medications   Medication Sig Dispense Refill     acetaminophen (TYLENOL) 325 MG tablet Take 2 tablets (650 mg) by mouth every 4 hours as needed for other (For optimal non-opioid multimodal pain management to improve pain control.) 100 tablet 3     aspirin (ASA) 81 MG chewable tablet Take 1 tablet (81 mg) by mouth daily 90 tablet 3     atorvastatin (LIPITOR) 80 MG tablet Take 1 tablet (80 mg) by mouth daily 60 tablet 4     calcium carbonate 600 mg-vitamin D 400 units (CALTRATE) 600-400 MG-UNIT per tablet Take 1 tablet by mouth 2 times daily 120 tablet 3     clopidogrel (PLAVIX) 75 MG tablet Take 1 tablet (75 mg) by mouth daily 90 tablet 3     fluticasone (FLONASE) 50 MCG/ACT nasal spray Spray 1 spray into both nostrils daily 18.2 mL 3     FLUZONE HIGH-DOSE QUADRIVALENT 0.7 ML THIERRY injection        hydrALAZINE (APRESOLINE) 10 MG tablet Take 1 tablet (10 mg) by mouth 3 times daily 270 tablet 1     isosorbide mononitrate (IMDUR) 60 MG 24 hr tablet Take 1 tablet (60 mg) by mouth daily 60 tablet 4     Lactobacillus-Inulin ([facility] DIGESTIVE [facility]) CAPS TAKE ONE CAPSULE BY MOUTH ONCE DAILY (DUE FOR PHYSICAL IN MARCH) 90 capsule 3     lisinopril (ZESTRIL) 2.5 MG tablet Take 1 tablet (2.5 mg) by mouth daily 90 tablet 1     metoprolol tartrate (LOPRESSOR) 100 MG tablet Take 1 tablet (100 mg) by mouth 2 times daily 180 tablet 1     mupirocin (BACTROBAN) 2 % external ointment Apply topically 3 times daily (Patient not taking: Reported on 12/23/2021) 30 g 0     mycophenolic acid  (GENERIC EQUIVALENT) 360 MG EC tablet Take 1 tablet (360 mg) by mouth 2 times daily 60 tablet 11     Nutritional Supplements (ENSURE CLEAR) LIQD Take 1 Bottle by mouth daily 396 mL 11     pantoprazole (PROTONIX) 40 MG EC tablet Take 1 tablet (40 mg) by mouth every morning (before breakfast) 60 tablet 3     predniSONE (DELTASONE) 5 MG tablet Take 1 tablet (5 mg) by mouth daily 90 tablet 3     torsemide (DEMADEX) 10 MG tablet Take 1 tablet (10mg) once every other day. Additional use as directed by [facility]. 90 tablet 1         ALLERGIES:  Allergies   Allergen Reactions     Blood Transfusion Related (Informational Only) Other (See Comments)     Patient has a history of a clinically significant antibody against RBC antigens.  A delay in compatible RBCs may occur.     Ultracet Nausea and Vomiting and Hives     Hydrocodone Nausea and Vomiting and Hives         PHYSICAL EXAM:  BP (!) 140/82   Pulse 76   Temp 97.5  F (36.4  C) (Oral)   Wt 41.3 kg (91 lb)   SpO2 95%   BMI 17.05 kg/m    Wt Readings from Last 4 Encounters:   03/29/22 41.3 kg (91 lb)   02/08/22 40.5 kg (89 lb 4.8 oz)   12/29/21 41.7 kg (92 lb)   12/28/21 41.3 kg (91 lb)     General: Alert, oriented, pleasant, NAD. Very thin.   HEENT: Normocephalic, atraumatic, no icterus.   Neck: No cervical or supraclavicular LAD.  Axillary: No LAD  Lungs: CTA bilaterally, normal work of breathing  Cardiac: RRR, quiet   Abdomen: Soft, nontender, nondistended. Normoactive bowel sounds. No hepatosplenomegaly, masses  Neuro: CNII-XII grossly intact  Extremities: No pedal edema        Labs:    3/1/2022 13:58   Sodium 138   Potassium 4.2   Chloride 103   Carbon Dioxide 28   Urea Nitrogen 34 (H)   Creatinine 1.56 (H)   GFR Estimate 36 (L)   Calcium 9.2   Anion Gap 7   Albumin 3.2 (L)   Protein Total 7.6   Bilirubin Total 0.5   Alkaline Phosphatase 150   ALT 40   AST 20   Glucose 98   WBC 9.7   Hemoglobin 12.4   Hematocrit 40.0   Platelet Count 315   RBC Count 4.48   MCV 89    MCH 27.7   MCHC 31.0 (L)   RDW 16.6 (H)   % Neutrophils 85   % Lymphocytes 5   % Monocytes 8   % Eosinophils 1   % Basophils 0   Absolute Basophils 0.0   Absolute Eosinophils 0.1   Absolute Immature Granulocytes 0.1   Absolute Lymphocytes 0.5 (L)   Absolute Monocytes 0.8   % Immature Granulocytes 1   Absolute Neutrophils 8.3   Absolute NRBCs 0.0   NRBCs per 100 WBC 0      6/12/2019 18:10 8/19/2019 10:44 2/13/2020 12:28 3/1/2021 15:08 3/1/2022 13:58   IGA  119  130 136   IGG  1,030  1,100 1,112   IGM  155  138 118   Immunofixation ELP  (Note)      [place] Free Lt Chain  3.15 (H) 3.15 (H) 3.25 (H) 3.95 (H)   Kappa Lambda Ratio  1.46 1.23 1.29 1.25   Lambda Free Lt Chain  2.16 2.57 2.52 3.16 (H)   Monoclonal Peak 0.1 (H) 0.1 (H) 0.1 (H) 0.1 (H) 0.1 (H)   Total Protein Serum for ELP     6.8         Imaging: CT Chest low dose 3/1  Impression:   1. Similar appearance of right upper lobe spiculated nodule measuring  to 27 mm status post SBRT. Appears relatively stable going back to  8/29/2016.  2. Remainder of some 6 mm pulmonary nodules are unchanged.  3. Similar appearance of descending thoracic aortic aneurysm measuring  up to 56 mm, better characterized on CT chest abdomen and pelvis  8/25/2021 due to the presence of intravenous contrast.  4. 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  5. Trace pericardial effusion.       ASSESSMENT/PLAN:  Maribel Yang is a 71 year old female with:    1) Anal canal carcinoma: history of HPV; [id number]. S/p excional biopsy on 3/14/18, with pathology showing poorly differentiated invasive squamous cell carcinoma, tumor size 7 mm, tumor invades into anal sphincter muscle, resection margins negative for carcinoma and high-grade dysplasia however Invasive tumor is 1 mm from closest margin. It was recommended to proceed with chemoradiation with Xeloda which she completed 6/2018.      She has been followed via surveillance since then.     Per NCCN guidelines,  even with T1 disease, she should have ITZEL every 3-6 months for 5 years, inguinal node palpation every 3-6 months for 5 years, anoscopy every 6-12 months for 3 years, and imaging annually for 3 years. Imaging was completed last year.      She was given a treatment plan summary detailing her treatment and long term effects of treatment in August 2020.     -Anoscopy, ITZEL, and inguinal exam UTD. Next due 8/2022.   -Follow-up with medical oncology/survivorship annual through year 5. Through 6/2023.     2) Risk of long-term radiation side effects:   -Chronic bowel dysmotility including diarrhea, clustering, urgency, frequency, and incontinence. Antidiarrheals, fiber, and pelvic floor therapy should be considered.   -Urogenital dysfunction including vaginal dryness, urinary urgency, frequency, or incontinence. Uro-gyn referral if urinary symptoms are present otherwise Replens for vaginal dryness.   -Risk of pelvic fractures/loss of bone density from pelvic XRT. Consider DEXA scans through PCP.     3) History of lung cancer in 2014: SCC of the RUL, stage [id number] ([place]) s/p SBRT.   -Last chest imaging a few days ago shows stable changes.  -NCCN guidelines recommend annual low-dose non-contrast chest CT. She had this done last 3/2022 which which was stable.     4) Alport's disease s/p renal transplant in 2004: followed by Dr. See.    -on immunosuppression, as per nephrology. Cr has been stable     5) MGUS: Had serum light chains and SPEP checked a few weeks ago which are overall stable. Light chains are increased both lambda and kappa which could be more from renal dysfunction. No anemia, hypercalcemia, or other cytopenias. Should be followed annually.     6) Cervical and vaginal dysplasia:underwent CO2 laser ablation procedure on 9/25/18 by Dr. Garcia. Should have regular PAPs/exam through GYN or PCP. Last PAP and colposcopy done 9/2020. Overdue for follow-up.     7) History of basal and squamous cell carcinoma of skin:  Continued follow-up with Dermatology. Overdue for follow-up.     8) Chronic systolic heart failure secondary to ICM has had LIUDMILA to RCA. Hx of PAD with EVAR 4/2021. Follows with [facility]. She has continued thigh numbness since surgery. I asked her to follow-up with vascular surgery about this.     8) Routine health maintenance/wellness guidelines:   -Continue to undergo regular preventative health screening, cancer screening, and immunizations with PCP.   -Maintain a healthy body weight throughout life; encouraged her to try plant based protein powder to help gain weight.   -Adhere to a healthy diet which is plant-based.   -Exercise regularly, ideally 30 minutes of moderate intensity exercise most days of the week.   -Limit alcohol consumption. No more than 1 drink per day for women.   -Tobacco cessation     Greater than 40 minutes was spent with this patient with greater than 20 minutes spent in counseling and coordination of care.      MADHURI Cadet PA-C

## 2022-03-29 NOTE — Clinical Note
"    3/29/2022         RE: Maribel Yang  [address]  [facility] [address]      [facility]    Hematology/Oncology Established Patient Note  Mar 29, 2022      Reason for Follow-up: anal canal carcinoma      HISTORY OF PRESENT ILLNESS: Maribel Yang is a 71 year old female with PMHx of kidney transplant in 2004, lung cancer in 2014 (SCC of the RUL, stage [id number] ([place]) s/p SBRT by Dr. Mckeon), HPV, PAD, who presents with anal canal carcinoma.   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 Renteria.  She has undergone high resolution anoscopy with biopsy, which showed superficially invasive squamous cell carcinoma.  On 3/14/18, she underwent exam under anesthesia with full thickness excision of superficially invasive anal cancer by Dr. Leo.  Pathology showed poorly differentiated invasive squamous cell carcinoma, tumor size 7 mm, tumor invades into anal sphincter muscle, resection margins negative for carcinoma and high-grade dysplasia.  Invasive tumor is 1 mm from closest margin.  There is background high grade squamous intraepithelial lesion (AIN 3).  It was staged pT1.  She has MRI pelvis on 2/28/18 that showed no pelvic or inguinal lymphadenopathy.    Patient was discussed at tumor conference, and consensus was that no further surgery was feasible, and recommendation is for chemoradiation, but with Xeloda without mitomycin, considering her co-morbidities and history of renal transplant on immunosuppression.    She started chemoradiation on 4/30/18 and completed it on 6/11/18. She presents in routine surveillance.       INTERIM HISTORY: Maribel has been feeling well. Feels \"the same\". She has slowly been recovering from aneurysm repair from last year. She lost 10 pounds after surgery and has been working to regain this. She is eating small portions frequently and is drinking an Ensure daily. She has " continued thigh numbness since surgery.     She has been taking culturelle and benefiber once daily and has had resolution of diarrhea. No recent abdominal pain or blood in stool.     No new or different persistent pain. Her energy level is stable. She had a cold a few weeks ago and is recovering from this.     REVIEW OF SYSTEMS:   14 point ROS was reviewed and is negative other than as noted above in HPI.       HOME MEDICATIONS:  Current Outpatient Medications   Medication Sig Dispense Refill     acetaminophen (TYLENOL) 325 MG tablet Take 2 tablets (650 mg) by mouth every 4 hours as needed for other (For optimal non-opioid multimodal pain management to improve pain control.) 100 tablet 3     aspirin (ASA) 81 MG chewable tablet Take 1 tablet (81 mg) by mouth daily 90 tablet 3     atorvastatin (LIPITOR) 80 MG tablet Take 1 tablet (80 mg) by mouth daily 60 tablet 4     calcium carbonate 600 mg-vitamin D 400 units (CALTRATE) 600-400 MG-UNIT per tablet Take 1 tablet by mouth 2 times daily 120 tablet 3     clopidogrel (PLAVIX) 75 MG tablet Take 1 tablet (75 mg) by mouth daily 90 tablet 3     fluticasone (FLONASE) 50 MCG/ACT nasal spray Spray 1 spray into both nostrils daily 18.2 mL 3     FLUZONE HIGH-DOSE QUADRIVALENT 0.7 ML THIERRY injection        hydrALAZINE (APRESOLINE) 10 MG tablet Take 1 tablet (10 mg) by mouth 3 times daily 270 tablet 1     isosorbide mononitrate (IMDUR) 60 MG 24 hr tablet Take 1 tablet (60 mg) by mouth daily 60 tablet 4     Lactobacillus-Inulin ([facility] DIGESTIVE [facility]) CAPS TAKE ONE CAPSULE BY MOUTH ONCE DAILY (DUE FOR PHYSICAL IN MARCH) 90 capsule 3     lisinopril (ZESTRIL) 2.5 MG tablet Take 1 tablet (2.5 mg) by mouth daily 90 tablet 1     metoprolol tartrate (LOPRESSOR) 100 MG tablet Take 1 tablet (100 mg) by mouth 2 times daily 180 tablet 1     mupirocin (BACTROBAN) 2 % external ointment Apply topically 3 times daily (Patient not taking: Reported on 12/23/2021) 30 g 0     mycophenolic acid  (GENERIC EQUIVALENT) 360 MG EC tablet Take 1 tablet (360 mg) by mouth 2 times daily 60 tablet 11     Nutritional Supplements (ENSURE CLEAR) LIQD Take 1 Bottle by mouth daily 396 mL 11     pantoprazole (PROTONIX) 40 MG EC tablet Take 1 tablet (40 mg) by mouth every morning (before breakfast) 60 tablet 3     predniSONE (DELTASONE) 5 MG tablet Take 1 tablet (5 mg) by mouth daily 90 tablet 3     torsemide (DEMADEX) 10 MG tablet Take 1 tablet (10mg) once every other day. Additional use as directed by [facility]. 90 tablet 1         ALLERGIES:  Allergies   Allergen Reactions     Blood Transfusion Related (Informational Only) Other (See Comments)     Patient has a history of a clinically significant antibody against RBC antigens.  A delay in compatible RBCs may occur.     Ultracet Nausea and Vomiting and Hives     Hydrocodone Nausea and Vomiting and Hives         PHYSICAL EXAM:  BP (!) 140/82   Pulse 76   Temp 97.5  F (36.4  C) (Oral)   Wt 41.3 kg (91 lb)   SpO2 95%   BMI 17.05 kg/m    Wt Readings from Last 4 Encounters:   03/29/22 41.3 kg (91 lb)   02/08/22 40.5 kg (89 lb 4.8 oz)   12/29/21 41.7 kg (92 lb)   12/28/21 41.3 kg (91 lb)     General: Alert, oriented, pleasant, NAD. Very thin.   HEENT: Normocephalic, atraumatic, no icterus.   Neck: No cervical or supraclavicular LAD.  Axillary: No LAD  Lungs: CTA bilaterally, normal work of breathing  Cardiac: RRR, quiet   Abdomen: Soft, nontender, nondistended. Normoactive bowel sounds. No hepatosplenomegaly, masses  Neuro: CNII-XII grossly intact  Extremities: No pedal edema        Labs:    3/1/2022 13:58   Sodium 138   Potassium 4.2   Chloride 103   Carbon Dioxide 28   Urea Nitrogen 34 (H)   Creatinine 1.56 (H)   GFR Estimate 36 (L)   Calcium 9.2   Anion Gap 7   Albumin 3.2 (L)   Protein Total 7.6   Bilirubin Total 0.5   Alkaline Phosphatase 150   ALT 40   AST 20   Glucose 98   WBC 9.7   Hemoglobin 12.4   Hematocrit 40.0   Platelet Count 315   RBC Count 4.48   MCV 89    MCH 27.7   MCHC 31.0 (L)   RDW 16.6 (H)   % Neutrophils 85   % Lymphocytes 5   % Monocytes 8   % Eosinophils 1   % Basophils 0   Absolute Basophils 0.0   Absolute Eosinophils 0.1   Absolute Immature Granulocytes 0.1   Absolute Lymphocytes 0.5 (L)   Absolute Monocytes 0.8   % Immature Granulocytes 1   Absolute Neutrophils 8.3   Absolute NRBCs 0.0   NRBCs per 100 WBC 0      6/12/2019 18:10 8/19/2019 10:44 2/13/2020 12:28 3/1/2021 15:08 3/1/2022 13:58   IGA  119  130 136   IGG  1,030  1,100 1,112   IGM  155  138 118   Immunofixation ELP  (Note)      [place] Free Lt Chain  3.15 (H) 3.15 (H) 3.25 (H) 3.95 (H)   Kappa Lambda Ratio  1.46 1.23 1.29 1.25   Lambda Free Lt Chain  2.16 2.57 2.52 3.16 (H)   Monoclonal Peak 0.1 (H) 0.1 (H) 0.1 (H) 0.1 (H) 0.1 (H)   Total Protein Serum for ELP     6.8         Imaging: CT Chest low dose 3/1  Impression:   1. Similar appearance of right upper lobe spiculated nodule measuring  to 27 mm status post SBRT. Appears relatively stable going back to  8/29/2016.  2. Remainder of some 6 mm pulmonary nodules are unchanged.  3. Similar appearance of descending thoracic aortic aneurysm measuring  up to 56 mm, better characterized on CT chest abdomen and pelvis  8/25/2021 due to the presence of intravenous contrast.  4. 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  5. Trace pericardial effusion.       ASSESSMENT/PLAN:  Maribel Yang is a 71 year old female with:    1) Anal canal carcinoma: history of HPV; [id number]. S/p excional biopsy on 3/14/18, with pathology showing poorly differentiated invasive squamous cell carcinoma, tumor size 7 mm, tumor invades into anal sphincter muscle, resection margins negative for carcinoma and high-grade dysplasia however Invasive tumor is 1 mm from closest margin. It was recommended to proceed with chemoradiation with Xeloda which she completed 6/2018.      She has been followed via surveillance since then.     Per NCCN guidelines,  even with T1 disease, she should have ITZEL every 3-6 months for 5 years, inguinal node palpation every 3-6 months for 5 years, anoscopy every 6-12 months for 3 years, and imaging annually for 3 years. Imaging was completed last year.      She was given a treatment plan summary detailing her treatment and long term effects of treatment in August 2020.     -Anoscopy, ITZEL, and inguinal exam UTD. Next due 8/2022.   -Follow-up with medical oncology/survivorship annual through year 5. Through 6/2023.     2) Risk of long-term radiation side effects:   -Chronic bowel dysmotility including diarrhea, clustering, urgency, frequency, and incontinence. Antidiarrheals, fiber, and pelvic floor therapy should be considered.   -Urogenital dysfunction including vaginal dryness, urinary urgency, frequency, or incontinence. Uro-gyn referral if urinary symptoms are present otherwise Replens for vaginal dryness.   -Risk of pelvic fractures/loss of bone density from pelvic XRT. Consider DEXA scans through PCP.     3) History of lung cancer in 2014: SCC of the RUL, stage [id number] ([place]) s/p SBRT.   -Last chest imaging a few days ago shows stable changes.  -NCCN guidelines recommend annual low-dose non-contrast chest CT. She had this done last 3/2022 which which was stable.     4) Alport's disease s/p renal transplant in 2004: followed by Dr. See.    -on immunosuppression, as per nephrology. Cr has been stable     5) MGUS: Had serum light chains and SPEP checked a few weeks ago which are overall stable. Light chains are increased both lambda and kappa which could be more from renal dysfunction. No anemia, hypercalcemia, or other cytopenias. Should be followed annually.     6) Cervical and vaginal dysplasia:underwent CO2 laser ablation procedure on 9/25/18 by Dr. Garcia. Should have regular PAPs/exam through GYN or PCP. Last PAP and colposcopy done 9/2020. Overdue for follow-up.     7) History of basal and squamous cell carcinoma of skin:  Continued follow-up with Dermatology. Overdue for follow-up.     8) Chronic systolic heart failure secondary to ICM has had LIUDMILA to RCA. Hx of PAD with EVAR 4/2021. Follows with [facility]. She has continued thigh numbness since surgery. I asked her to follow-up with vascular surgery about this.     8) Routine health maintenance/wellness guidelines:   -Continue to undergo regular preventative health screening, cancer screening, and immunizations with PCP.   -Maintain a healthy body weight throughout life; encouraged her to try plant based protein powder to help gain weight.   -Adhere to a healthy diet which is plant-based.   -Exercise regularly, ideally 30 minutes of moderate intensity exercise most days of the week.   -Limit alcohol consumption. No more than 1 drink per day for women.   -Tobacco cessation     Greater than 40 minutes was spent with this patient with greater than 20 minutes spent in counseling and coordination of care.      MADHURI Cadet PA-C

## 2022-03-30 ENCOUNTER — TELEPHONE (OUTPATIENT)
Dept: SURGERY | Facility: CLINIC | Age: 72
End: 2022-03-30
Payer: COMMERCIAL

## 2022-03-31 DIAGNOSIS — I71.40 ABDOMINAL AORTIC ANEURYSM (AAA) WITHOUT RUPTURE (H): ICD-10-CM

## 2022-03-31 DIAGNOSIS — T81.49XA WOUND INFECTION AFTER SURGERY: ICD-10-CM

## 2022-03-31 DIAGNOSIS — I21.11 ST ELEVATION MYOCARDIAL INFARCTION INVOLVING RIGHT CORONARY ARTERY (H): ICD-10-CM

## 2022-03-31 DIAGNOSIS — I20.1 CORONARY ARTERY VASOSPASM (H): ICD-10-CM

## 2022-03-31 RX ORDER — ISOSORBIDE MONONITRATE 60 MG/1
60 TABLET, EXTENDED RELEASE ORAL DAILY
Qty: 60 TABLET | Refills: 4 | Status: CANCELLED | OUTPATIENT
Start: 2022-03-31

## 2022-03-31 NOTE — TELEPHONE ENCOUNTER
isosorbide mononitrate (IMDUR) 60 MG 24 hr       Last Written Prescription Date:  5-  Last Fill Quantity: 60,   # refills: 4  Last Office Visit : 3-  Future Office visit:  NONE    Routing refill request to provider for review/approval because:  GAP IN THEERAPY      Kathleen M Doege RN

## 2022-03-31 NOTE — TELEPHONE ENCOUNTER
[facility] Call Center    Phone Message    May a detailed message be left on voicemail: yes     Reason for Call: Medication Refill Request    Has the patient contacted the pharmacy for the refill? Yes   Name of medication being requested: isosorbide mononitrate (IMDUR) 60 MG 24 hr     Provider who prescribed the medication: Marguerite Muñoz  Pharmacy: [facility] - [place], [place] - [address]   Date medication is needed: asap         Action Taken: Message routed to:  [facility] ([facility]): clinical pool    Travel Screening: Not Applicable

## 2022-04-06 RX ORDER — ISOSORBIDE MONONITRATE 60 MG/1
60 TABLET, EXTENDED RELEASE ORAL DAILY
Qty: 90 TABLET | Refills: 1 | Status: SHIPPED | OUTPATIENT
Start: 2022-04-06 | End: 2022-10-05

## 2022-04-28 NOTE — PROGRESS NOTES
"SUBJECTIVE:   Maribel Yang is a 71 year old female who presents for Preventive Visit.        Are you in the first 12 months of your Medicare coverage?  No    Healthy Habits:     In general, how would you rate your overall health?  Fair    Frequency of exercise:  None    Do you usually eat at least 4 servings of fruit and vegetables a day, include whole grains    & fiber and avoid regularly eating high fat or \"junk\" foods?  No    Taking medications regularly:  Yes    Medication side effects:  Lightheadedness    Ability to successfully perform activities of daily living:  No assistance needed    Home Safety:  Lack of grab bars in the bathroom    Hearing Impairment:  No hearing concerns    In the past 6 months, have you been bothered by leaking of urine? Yes    In general, how would you rate your overall mental or emotional health?  Fair      PHQ-2 Total Score: 2    Additional concerns today:  Yes    Do you feel safe in your environment? Yes    Have you ever done Advance Care Planning? (For example, a Health Directive, POLST, or a discussion with a medical provider or your loved ones about your wishes): Yes, advance care planning is on file.       Fall risk  Fallen 2 or more times in the past year?: No  Any fall with injury in the past year?: No    Cognitive Screening   1) Repeat 3 items  2) Clock draw:NORMAL  3) 3 item recall: Recalls 3 objects  Results: 3 items recalled: COGNITIVE IMPAIRMENT LESS LIKELY    Mini-CogTM Copyright TERESSA Antonio. Licensed by the author for use in [facility]; reprinted with permission (maik@.[email or web address]). All rights reserved.      Do you have sleep apnea, excessive snoring or daytime drowsiness?: no    Reviewed and updated as needed this visit by clinical staff                    Reviewed and updated as needed this visit by Provider                   Social History     Tobacco Use     Smoking status: Former Smoker     Packs/day: 0.30     Years: 35.00     Pack years: 10.50     Types: " Cigarettes     Start date: 1967     Quit date: 3/1/2021     Years since quittin.1     Smokeless tobacco: Never Used     Tobacco comment: Couple times a week. 1-2 cigs 1-2x a week.   Substance Use Topics     Alcohol use: Not Currently     Comment: rarely     If you drink alcohol do you typically have >3 drinks per day or >7 drinks per week? No    Alcohol Use 5/3/2022   Prescreen: >3 drinks/day or >7 drinks/week? Not Applicable   Prescreen: >3 drinks/day or >7 drinks/week? -   No flowsheet data found.        -------------------------------------    Symptoms gradual over the past year  Trouble with breathing - orthopnea  Able to walk up and down steps 6-8 times a day  Decrease PO intake   Has torsemide every other day - takes additional if gain 2 pounds, worsening orthopnea - weight steady between 88-90 pounds        Current providers sharing in care for this patient include:   Patient Care Team:  Lisa Martinez DO as PCP - General (Family Practice)  Hitesh See MD as MD (Nephrology)  Nyasia Gaines MD as Referring Physician (OB/Gyn)  Joel Davis MD as MD (Family Practice)  Rosalie Pelletier PA-C as Physician Assistant (Physician Assistant)  Lisa Martinez DO as Assigned PCP  Liliana Renteria MD as MD (Oncology)  Leelee Evans MD as MD (INTERNAL MEDICINE - ENDOCRINOLOGY, DIABETES & METABOLISM)  Juan Rene MD as MD (Medical Oncology)  Eloy Flores MD as Assigned Infectious Disease Provider  Marquise Wilkerson MD as MD (Cardiovascular Disease)  Marquise Wilkerson MD as MD (Cardiovascular Disease)  Jessica Bunn APRN CNP as Referring Physician (Vascular Surgery)  Carolee Gar, RN as Specialty Care Coordinator (Cardiology)  Marguerite Muñoz APRN CNP as Nurse Practitioner (Cardiovascular Disease)  Carolee Gar, RN as Specialty Care Coordinator (Cardiology)  Marguerite Muñoz APRN CNP as Nurse Practitioner (Cardiovascular Disease)  Marguerite Muñoz APRN CNP as  Assigned Heart and Vascular Provider  Angelica Ribeiro PA-C as Assigned Cancer Care Provider  Thomas Schroeder MD as MD (Surgery)  Flaca Julien MD as Assigned Nephrology Provider  Shabbir Leo MD as Assigned Surgical Provider    The following health maintenance items are reviewed in Epic and correct as of today:  Health Maintenance Due   Topic Date Due     HF ACTION PLAN  Never done     ANNUAL REVIEW OF HM ORDERS  Never done     COPD ACTION PLAN  Never done     ZOSTER IMMUNIZATION (1 of 2) Never done     ADVANCE CARE PLANNING  10/03/2017     DTAP/TDAP/TD IMMUNIZATION (1 - Tdap) 03/13/2019     MICROALBUMIN  04/24/2019     Pneumococcal Vaccine: 65+ Years (4 - PPSV23 or PCV20) 08/07/2019     FALL RISK ASSESSMENT  02/09/2022     Patient Active Problem List   Diagnosis     Other specified congenital anomalies     Kidney replaced by transplant     S/P LEEP of cervix     CARDIOVASCULAR SCREENING; LDL GOAL LESS THAN 100     Immunosuppression (H)     HTN, kidney transplant related     History of basal cell carcinoma     YUNIOR III (vulvar intraepithelial neoplasia III)     Peripheral artery disease (H)     Squamous cell lung cancer (H)     Lumbago     Vaginal dysplasia     Alopecia     Cherry angioma     Skin cancer screening     Intertrigo     History of basal cell carcinoma     Malignant neoplasm of anal canal (H)     Aftercare following organ transplant     Age-related osteoporosis without current pathological fracture     Acute deep vein thrombosis (DVT) of proximal vein of lower extremity, unspecified laterality (H)     Chronic kidney disease, stage 3 (H)     Abdominal aortic aneurysm (AAA) without rupture (H)     Hematoma     Physical deconditioning     Congestive heart failure, unspecified HF chronicity, unspecified heart failure type (H)     Coronary artery disease involving native coronary artery with angina pectoris with documented spasm (H)     Mild protein-calorie malnutrition (H)     Past  Surgical History:   Procedure Laterality Date     BIOPSY      Kidney, Lung, Breast     BIOPSY ANAL N/A 03/14/2018    Procedure: BIOPSY ANAL;;  Surgeon: Shabbir Leo MD;  [facility]     BYPASS GRAFT FEMORAL FEMORAL Bilateral 04/25/2021    Procedure: Axplantation infected graft, femoral to femoral bypass with cadaveric artery, bilateral SATORIUS MUSCLE FLAP CREATION, evacuation of absece, vacuum closure;  Surgeon: Raven Lewis MD;  [facility]     COLONOSCOPY       COLONOSCOPY N/A 08/09/2017    Procedure: COMBINED COLONOSCOPY, SINGLE OR MULTIPLE BIOPSY/POLYPECTOMY BY BIOPSY;;  Surgeon: Sushil Hyatt MD;  [facility]     COLPOSCOPY,LOOP ELECTRD CERVIX EXCIS  03/11/2008    TAL II     CONIZATION LEEP  07/17/2013    Procedure: CONIZATION LEEP;;  Surgeon: Liliana Renteria MD;  [facility]     CONIZATION LEEP N/A 08/17/2016    Procedure: CONIZATION LEEP;  Surgeon: Liliana Renteria MD;  [facility]     CV CORONARY ANGIOGRAM N/A 04/06/2021    Procedure: CV CORONARY ANGIOGRAM;  Surgeon: Sincere Rosas MD;  [facility]     CV CORONARY ANGIOGRAM N/A 04/25/2021    Procedure: Coronary Angiogram;  Surgeon: Sincere Rosas MD;  [facility]     CV PCI STENT DRUG ELUTING N/A 04/06/2021    Procedure: Percutaneous Coronary Intervention Stent Drug Eluting;  Surgeon: Sincere Rosas MD;  Location:  [facility]     ENDOVASCULAR REPAIR ANEURYSM AORTOILIAC Bilateral 04/06/2021    Procedure: Endovascular Abdominal Aortic Aneurysm Repair with Aortouniiliac Device and Femoral-Femoral Artery Bypass with [id number] Artegraft;  Surgeon: Abena Ferrara MD;  [facility]     EXAM UNDER ANESTHESIA ANUS  07/15/2014    Procedure: EXAM UNDER ANESTHESIA ANUS;  Surgeon: Radha Musa MD;  [facility]     EXAM UNDER ANESTHESIA ANUS N/A 03/14/2018    Procedure: EXAM UNDER ANESTHESIA ANUS;  Anal  Exam Under Anesthesia With Excision of anal lesion, proctoscopy;  Surgeon: Shabbir Leo MD;  [facility]     EYE SURGERY       GENITOURINARY SURGERY       IR OR ANGIOGRAM  04/06/2021     IRRIGATION AND DEBRIDEMENT GROIN Right 04/24/2021    Procedure: IRRIGATION AND DEBRIDEMENT, INGUINAL REGION, I & D PF RIGHT GROIN;  Surgeon: Raven Lewis MD;  [facility]     IRRIGATION AND DEBRIDEMENT GROIN N/A 04/26/2021    Procedure: IRRIGATION AND DEBRIDEMENT, INGUINAL REGION, PLACEMENT OF VERAFLO WOUND VAC, WOUND WASHOUT,;  Surgeon: Raven Lewis MD;  [facility]     LASER CO2 EXCISE VULVA WIDE LOCAL  07/15/2014    Procedure: LASER CO2 EXCISE VULVA WIDE LOCAL;  Surgeon: Liliana Renteria MD;  [facility]     LASER CO2 VAGINA  07/17/2013    Procedure: LASER CO2 VAGINA;;  Surgeon: Liliana Renteria MD;  [facility]     LASER CO2 VAGINA N/A 09/25/2018    Procedure: LASER CO2 VAGINA;  Exam Under Anesthesia, CO2 Laser Ablation of Upper Vagina and Cervix;  Surgeon: Pati Garcia MD;  [facility]     MICROSCOPY ANAL  07/17/2013    Procedure: MICROSCOPY ANAL;  Anal Microscopy,  EUA vagina,Colposcopy Of Vagina And Vulva, Vaginal Biopsies, Omniguide Co2 Laser To Vagina and vulva, Loop Electrosurgical Excision Procedure To Cervix;  Surgeon: Radha Musa MD;  [facility]     MICROSCOPY ANAL  07/15/2014    Procedure: MICROSCOPY ANAL;  Surgeon: Radha Musa MD;  [facility]     PICC DOUBLE LUMEN PLACEMENT Right 04/30/2021    39cm, Basilic vein     TRANSESOPHAGEAL ECHOCARDIOGRAM INTRAOPERATIVE N/A 04/28/2021    Procedure: ECHOCARDIOGRAM, TRANSESOPHAGEAL, INTRAOPERATIVE;  Surgeon: GENERIC ANESTHESIA PROVIDER;  Location: [facility]     VASCULAR SURGERY      Thrombectomy     [facility] NONSPECIFIC PROCEDURE      Thrombectomy     [facility] NONSPECIFIC PROCEDURE  1955 and 1959    Bilater eye surgery - correction for crossed eyes     [facility] NONSPECIFIC PROCEDURE  1998    oopherectomy L     [facility]  NONSPECIFIC PROCEDURE      open kidney biopsy - L     [facility] TRANSPLANTATION OF KIDNEY  2004    recipient -- done at [facility]       Social History     Tobacco Use     Smoking status: Former Smoker     Packs/day: 0.30     Years: 35.00     Pack years: 10.50     Types: Cigarettes     Start date: 1967     Quit date: 3/1/2021     Years since quittin.1     Smokeless tobacco: Never Used     Tobacco comment: Couple times a week. 1-2 cigs 1-2x a week.   Substance Use Topics     Alcohol use: Not Currently     Comment: rarely     Family History   Problem Relation Age of Onset     Diabetes Father      Alcohol/Drug Father      Arthritis Father      Hypertension Father      Lipids Father         high cholesterol     Arthritis Mother      Diabetes Mother      Depression Mother      Heart Disease Mother      Neurologic Disorder Mother      Obesity Mother      Psychotic Disorder Mother      Thyroid Disease Mother      Hypertension Mother      Gynecology Sister         Precancerous cell removal from cervix at age 45     Depression Sister      Allergies Sister      Alcohol/Drug Sister      Neurologic Disorder Sister      Cerebrovascular Disease Paternal Grandmother      Diabetes Paternal Grandmother      Alcohol/Drug Son      Colon Polyps Sister      Breast Cancer Niece      Other Cancer Sister         Cervical     Obesity Sister      Depression Sister      Substance Abuse Son      Substance Abuse Sister              Asthma Other      Colon Cancer No family hx of      Crohn's Disease No family hx of      Ulcerative Colitis No family hx of      Melanoma No family hx of      Skin Cancer No family hx of          Pneumonia Vaccine:will give PCV23 today   Mammogram Screening: Mammogram Screening: Recommended mammography every 1-2 years with patient discussion and risk factor consideration  -Due for gyn oncology visit - she is going to schedule    FHS-7:   Breast CA Risk Assessment (FHS-7) 10/19/2021   Did any of your  "first-degree relatives have breast or ovarian cancer? No   Did any of your relatives have bilateral breast cancer? No   Did any man in your family have breast cancer? No   Did any woman in your family have breast and ovarian cancer? No   Did any woman in your family have breast cancer before age 50 y? No   Do you have 2 or more relatives with breast and/or ovarian cancer? No   Do you have 2 or more relatives with breast and/or bowel cancer? No         Pertinent mammograms are reviewed under the imaging tab.    Review of Systems   Constitutional: Negative for chills and fever.   HENT: Negative for congestion, ear pain, hearing loss and sore throat.    Eyes: Negative for pain and visual disturbance.   Respiratory: Positive for cough and shortness of breath.    Cardiovascular: Negative for chest pain, palpitations and peripheral edema.   Gastrointestinal: Negative for abdominal pain, constipation, diarrhea, heartburn, hematochezia and nausea.   Breasts:  Negative for tenderness, breast mass and discharge.   Genitourinary: Positive for urgency. Negative for dysuria, frequency, genital sores, hematuria, pelvic pain, vaginal bleeding and vaginal discharge.   Musculoskeletal: Negative for arthralgias, joint swelling and myalgias.   Skin: Negative for rash.   Neurological: Positive for weakness. Negative for dizziness, headaches and paresthesias.   Psychiatric/Behavioral: Negative for mood changes. The patient is not nervous/anxious.          OBJECTIVE:   There were no vitals taken for this visit. Estimated body mass index is 17.05 kg/m  as calculated from the following:    Height as of 2/8/22: 1.556 m (5' 1.25\").    Weight as of 3/29/22: 41.3 kg (91 lb).  Physical Exam  GENERAL APPEARANCE: alert, no distress, pale and some dyspnea with sentences  EYES: Eyes grossly normal to inspection, PERRL and conjunctivae and sclerae normal  HENT: ear canals and TM's normal  NECK: no adenopathy, no asymmetry, masses, or scars and " thyroid normal to palpation  RESP: decreased breath sounds throughout  BREAST: normal without masses, tenderness or nipple discharge and no palpable axillary masses or adenopathy  CV: regular rates and rhythm, no peripheral edema and peripheral pulses strong  ABDOMEN: soft, nontender, no hepatosplenomegaly, no masses and bowel sounds normal  MS: no musculoskeletal defects are noted and gait is age appropriate without ataxia  NEURO: Normal strength and tone, sensory exam grossly normal, mentation intact and speech normal  PSYCH: mentation appears normal and tearful    Diagnostic Test Results:  Labs reviewed in Epic  Results for orders placed or performed in visit on 05/03/22 (from the past 24 hour(s))   CBC with platelets   Result Value Ref Range    WBC Count 9.2 4.0 - 11.0 10e3/uL    RBC Count 4.88 3.80 - 5.20 10e6/uL    Hemoglobin 13.9 11.7 - 15.7 g/dL    Hematocrit 43.4 35.0 - 47.0 %    MCV 89 78 - 100 fL    MCH 28.5 26.5 - 33.0 pg    MCHC 32.0 31.5 - 36.5 g/dL    RDW 17.2 (H) 10.0 - 15.0 %    Platelet Count 298 150 - 450 10e3/uL     *Note: Due to a large number of results and/or encounters for the requested time period, some results have not been displayed. A complete set of results can be found in Results Review.       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)  Comment:    Plan: Lipid panel reflex to direct LDL Non-fasting             (N18.30) Stage 3 chronic kidney disease, unspecified whether stage 3a or  "3b CKD (H)  Comment:    Plan:      (I25.111) Coronary artery disease involving native coronary artery of native heart with angina pectoris with documented spasm (H)  Comment:  Follows with cardiology -   CHF with EF 30% contributing to dyspnea and orthopnea   Plan: Lipid panel reflex to direct LDL Non-fasting             (Z94.0) Kidney replaced by transplant  Comment:    Plan:      (M81.0) Age-related osteoporosis without current pathological fracture  Comment:  Due for dexa however patient was not able to afford Prolia - will check with pharmD about if options  Plan:      (Z12.11) Special screening for malignant neoplasms, colon  Comment: due for colon cancer screening - has been 5 years   Ordered   Plan: Adult Gastro Ref - Procedure Only             (Z94.0) Kidney transplanted  Comment:    Plan:      (Z48.298) Aftercare following organ transplant  Comment:    Plan:      (Z79.899,  Z94.0) Immunosuppressive management encounter following kidney transplant  Comment:    Plan:      (D84.9) Immunosuppressed status (H)  Comment:    Plan:          COUNSELING:  Reviewed preventive health counseling, as reflected in patient instructions    Estimated body mass index is 17.05 kg/m  as calculated from the following:    Height as of 2/8/22: 1.556 m (5' 1.25\").    Weight as of 3/29/22: 41.3 kg (91 lb).    Weight management plan noted, stable and monitoring    She reports that she quit smoking about 14 months ago. Her smoking use included cigarettes. She started smoking about 55 years ago. She has a 10.50 pack-year smoking history. She has never used smokeless tobacco.      Appropriate preventive services were discussed with this patient, including applicable screening as appropriate for cardiovascular disease, diabetes, osteopenia/osteoporosis, and glaucoma.  As appropriate for age/gender, discussed screening for colorectal cancer, prostate cancer, breast cancer, and cervical cancer. Checklist reviewing preventive services " available has been given to the patient.    Reviewed patients plan of care and provided an AVS. The Basic Care Plan (routine screening as documented in Health Maintenance) for Maribel meets the Care Plan requirement. This Care Plan has been established and reviewed with the Patient.    Counseling Resources:  ATP IV Guidelines  Pooled Cohorts Equation Calculator  Breast Cancer Risk Calculator  Breast Cancer: Medication to Reduce Risk  FRAX Risk Assessment  ICSI Preventive Guidelines  Dietary Guidelines for Americans, 2010  USDA's MyPlate  ASA Prophylaxis  Lung CA Screening    Lisa Martinez [facility]    Identified Health Risks:

## 2022-05-03 ENCOUNTER — MYC MEDICAL ADVICE (OUTPATIENT)
Dept: DERMATOLOGY | Facility: CLINIC | Age: 72
End: 2022-05-03

## 2022-05-03 ENCOUNTER — OFFICE VISIT (OUTPATIENT)
Dept: FAMILY MEDICINE | Facility: CLINIC | Age: 72
End: 2022-05-03
Payer: COMMERCIAL

## 2022-05-03 VITALS
DIASTOLIC BLOOD PRESSURE: 82 MMHG | RESPIRATION RATE: 18 BRPM | OXYGEN SATURATION: 97 % | SYSTOLIC BLOOD PRESSURE: 128 MMHG | TEMPERATURE: 97.3 F | WEIGHT: 90.2 LBS | BODY MASS INDEX: 17.03 KG/M2 | HEIGHT: 61 IN | HEART RATE: 84 BPM

## 2022-05-03 DIAGNOSIS — Z12.11 SPECIAL SCREENING FOR MALIGNANT NEOPLASMS, COLON: ICD-10-CM

## 2022-05-03 DIAGNOSIS — D84.9 IMMUNOSUPPRESSED STATUS (H): ICD-10-CM

## 2022-05-03 DIAGNOSIS — Z94.0 KIDNEY REPLACED BY TRANSPLANT: ICD-10-CM

## 2022-05-03 DIAGNOSIS — Z94.0 KIDNEY TRANSPLANTED: ICD-10-CM

## 2022-05-03 DIAGNOSIS — N18.30 STAGE 3 CHRONIC KIDNEY DISEASE, UNSPECIFIED WHETHER STAGE 3A OR 3B CKD (H): ICD-10-CM

## 2022-05-03 DIAGNOSIS — I73.9 PERIPHERAL VASCULAR DISEASE, UNSPECIFIED (H): ICD-10-CM

## 2022-05-03 DIAGNOSIS — D84.9 IMMUNOSUPPRESSION (H): ICD-10-CM

## 2022-05-03 DIAGNOSIS — Z94.0 IMMUNOSUPPRESSIVE MANAGEMENT ENCOUNTER FOLLOWING KIDNEY TRANSPLANT: ICD-10-CM

## 2022-05-03 DIAGNOSIS — Z79.899 IMMUNOSUPPRESSIVE MANAGEMENT ENCOUNTER FOLLOWING KIDNEY TRANSPLANT: ICD-10-CM

## 2022-05-03 DIAGNOSIS — I25.111 CORONARY ARTERY DISEASE INVOLVING NATIVE CORONARY ARTERY OF NATIVE HEART WITH ANGINA PECTORIS WITH DOCUMENTED SPASM (H): ICD-10-CM

## 2022-05-03 DIAGNOSIS — Z00.00 ENCOUNTER FOR MEDICARE ANNUAL WELLNESS EXAM: Primary | ICD-10-CM

## 2022-05-03 DIAGNOSIS — E44.1 MILD PROTEIN-CALORIE MALNUTRITION (H): ICD-10-CM

## 2022-05-03 DIAGNOSIS — M81.0 AGE-RELATED OSTEOPOROSIS WITHOUT CURRENT PATHOLOGICAL FRACTURE: ICD-10-CM

## 2022-05-03 DIAGNOSIS — I20.1 CORONARY ARTERY VASOSPASM (H): ICD-10-CM

## 2022-05-03 DIAGNOSIS — Z48.298 AFTERCARE FOLLOWING ORGAN TRANSPLANT: ICD-10-CM

## 2022-05-03 LAB
CREAT UR-MCNC: 26 MG/DL
ERYTHROCYTE [DISTWIDTH] IN BLOOD BY AUTOMATED COUNT: 17.2 % (ref 10–15)
HCT VFR BLD AUTO: 43.4 % (ref 35–47)
HGB BLD-MCNC: 13.9 G/DL (ref 11.7–15.7)
MCH RBC QN AUTO: 28.5 PG (ref 26.5–33)
MCHC RBC AUTO-ENTMCNC: 32 G/DL (ref 31.5–36.5)
MCV RBC AUTO: 89 FL (ref 78–100)
PLATELET # BLD AUTO: 298 10E3/UL (ref 150–450)
PROT UR-MCNC: 0.67 G/L
PROT/CREAT 24H UR: 2.58 G/G CR (ref 0–0.2)
RBC # BLD AUTO: 4.88 10E6/UL (ref 3.8–5.2)
WBC # BLD AUTO: 9.2 10E3/UL (ref 4–11)

## 2022-05-03 PROCEDURE — 80048 BASIC METABOLIC PNL TOTAL CA: CPT | Performed by: FAMILY MEDICINE

## 2022-05-03 PROCEDURE — 90732 PPSV23 VACC 2 YRS+ SUBQ/IM: CPT | Performed by: FAMILY MEDICINE

## 2022-05-03 PROCEDURE — 99397 PER PM REEVAL EST PAT 65+ YR: CPT | Mod: 25 | Performed by: FAMILY MEDICINE

## 2022-05-03 PROCEDURE — 80061 LIPID PANEL: CPT | Performed by: FAMILY MEDICINE

## 2022-05-03 PROCEDURE — 36415 COLL VENOUS BLD VENIPUNCTURE: CPT | Performed by: FAMILY MEDICINE

## 2022-05-03 PROCEDURE — 87799 DETECT AGENT NOS DNA QUANT: CPT | Performed by: FAMILY MEDICINE

## 2022-05-03 PROCEDURE — G0009 ADMIN PNEUMOCOCCAL VACCINE: HCPCS | Performed by: FAMILY MEDICINE

## 2022-05-03 PROCEDURE — 84156 ASSAY OF PROTEIN URINE: CPT | Performed by: FAMILY MEDICINE

## 2022-05-03 PROCEDURE — 85027 COMPLETE CBC AUTOMATED: CPT | Performed by: FAMILY MEDICINE

## 2022-05-03 ASSESSMENT — ENCOUNTER SYMPTOMS
HEMATURIA: 0
PALPITATIONS: 0
CHILLS: 0
WEAKNESS: 1
SORE THROAT: 0
DYSURIA: 0
CONSTIPATION: 0
DIZZINESS: 0
HEADACHES: 0
JOINT SWELLING: 0
PARESTHESIAS: 0
FREQUENCY: 0
HEARTBURN: 0
COUGH: 1
ABDOMINAL PAIN: 0
EYE PAIN: 0
HEMATOCHEZIA: 0
BREAST MASS: 0
FEVER: 0
ARTHRALGIAS: 0
MYALGIAS: 0
SHORTNESS OF BREATH: 1
DIARRHEA: 0
NERVOUS/ANXIOUS: 0
NAUSEA: 0

## 2022-05-03 ASSESSMENT — ACTIVITIES OF DAILY LIVING (ADL): CURRENT_FUNCTION: NO ASSISTANCE NEEDED

## 2022-05-03 NOTE — PROGRESS NOTES
The patient was provided with suggestions to help her develop a healthy physical lifestyle.  She is at risk for lack of exercise and has been provided with information to increase physical activity for the benefit of her well-being.  The patient was counseled and encouraged to consider modifying their diet and eating habits. She was provided with information on recommended healthy diet options.  Information on urinary incontinence and treatment options given to patient.  The patient was provided with suggestions to help her develop a healthy emotional lifestyle.

## 2022-05-03 NOTE — PATIENT INSTRUCTIONS
Patient Education   Personalized Prevention Plan  You are due for the preventive services outlined below.  Your care team is available to assist you in scheduling these services.  If you have already completed any of these items, please share that information with your care team to update in your medical record.  Health Maintenance Due   Topic Date Due     Heart Failure Action Plan  Never done     ANNUAL REVIEW OF HM ORDERS  Never done     COPD Action Plan  Never done     Zoster (Shingles) Vaccine (1 of 2) Never done     Diptheria Tetanus Pertussis (DTAP/TDAP/TD) Vaccine (1 - Tdap) 03/13/2019     Kidney Microalbumin Urine Test  04/24/2019     Pneumococcal Vaccine (4 - PPSV23 or PCV20) 08/07/2019     FALL RISK ASSESSMENT  02/09/2022     Your Health Risk Assessment indicates you feel you are not in good health    A healthy lifestyle helps keep the body fit and the mind alert. It helps protect you from disease, helps you fight disease, and helps prevent chronic disease (disease that doesn't go away) from getting worse. This is important as you get older and begin to notice twinges in muscles and joints and a decline in the strength and stamina you once took for granted. A healthy lifestyle includes good healthcare, good nutrition, weight control, recreation, and regular exercise. Avoid harmful substances and do what you can to keep safe. Another part of a healthy lifestyle is stay mentally active and socially involved.    Good healthcare     Have a wellness visit every year.     If you have new symptoms, let us know right away. Don't wait until the next checkup.     Take medicines exactly as prescribed and keep your medicines in a safe place. Tell us if your medicine causes problems.   Healthy diet and weight control     Eat 3 or 4 small, nutritious, low-fat, high-fiber meals a day. Include a variety of fruits, vegetables, and whole-grain foods.     Make sure you get enough calcium in your diet. Calcium, vitamin D,  and exercise help prevent osteoporosis (bone thinning).     If you live alone, try eating with others when you can. That way you get a good meal and have company while you eat it.     Try to keep a healthy weight. If you eat more calories than your body uses for energy, it will be stored as fat and you will gain weight.     Recreation   Recreation is not limited to sports and team events. It includes any activity that provides relaxation, interest, enjoyment, and exercise. Recreation provides an outlet for physical, mental, and social energy. It can give a sense of worth and achievement. It can help you stay healthy.    Mental Exercise and Social Involvement  Mental and emotional health is as important as physical health. Keep in touch with friends and family. Stay as active as possible. Continue to learn and challenge yourself.   Things you can do to stay mentally active are:    Learn something new, like a foreign language or musical instrument.     Play SCRABBLE or do crossword puzzles. If you cannot find people to play these games with you at home, you can play them with others on your computer through the Internet.     Join a games club--anything from card games to chess or checkers or lawn bowling.     Start a new hobby.     Go back to school.     Volunteer.     Read.   Keep up with world events.    Exercise for a Healthier Heart  You may wonder how you can improve the health of your heart. If you re thinking about exercise, you re on the right track. You don t need to become an athlete. But you do need a certain amount of brisk exercise to help strengthen your heart. If you have been diagnosed with a heart condition, your healthcare provider may advise exercise to help stabilize your condition. To help make exercise a habit, choose safe, fun activities.      Exercise with a friend. When activity is fun, you're more likely to stick with it.   Before you start  Check with your healthcare provider before starting  an exercise program. This is especially important if you have not been active for a while. It's also important if you have a long-term (chronic) health problem such as heart disease, diabetes, or obesity. Or if you are at high risk for having these problems.   Why exercise?  Exercising regularly offers many healthy rewards. It can help you do all of the following:     Improve your blood cholesterol level to help prevent further heart trouble    Lower your blood pressure to help prevent a stroke or heart attack    Control diabetes, or reduce your risk of getting this disease    Improve your heart and lung function    Reach and stay at a healthy weight    Make your muscles stronger so you can stay active    Prevent falls and fractures by slowing the loss of bone mass (osteoporosis)    Manage stress better    Reduce your blood pressure    Improve your sense of self and your body image  Exercise tips      Ease into your routine. Set small goals. Then build on them. If you are not sure what your activity level should be, talk with your healthcare provider first before starting an exercise routine.    Exercise on most days. Aim for a total of 150 minutes (2 hours and 30 minutes) or more of moderate-intensity aerobic activity each week. Or 75 minutes (1 hour and 15 minutes) or more of vigorous-intensity aerobic activity each week. Or try for a combination of both. Moderate activity means that you breathe heavier and your heart rate increases but you can still talk. Think about doing 40 minutes of moderate exercise, 3 to 4 times a week. For best results, activity should last for about 40 minutes to lower blood pressure and cholesterol. It's OK to work up to the 40-minute period over time. Examples of moderate-intensity activity are walking 1 mile in 15 minutes. Or doing 30 to 45 minutes of yard work.    Step up your daily activity level.  Along with your exercise program, try being more active the whole day. Walk instead of  drive. Or park further away so that you take more steps each day. Do more household tasks or yard work. You may not be able to meet the advised mount of physical activity. But doing some moderate- or vigorous-intensity aerobic activity can help reduce your risk for heart disease. Your healthcare provider can help you figure out what is best for you.    Choose 1 or more activities you enjoy.  Walking is one of the easiest things you can do. You can also try swimming, riding a bike, dancing, or taking an exercise class.    When to call your healthcare provider  Call your healthcare provider if you have any of these:     Chest pain or feel dizzy or lightheaded    Burning, tightness, pressure, or heaviness in your chest, neck, shoulders, back, or arms    Abnormal shortness of breath    More joint or muscle pain    A very fast or irregular heartbeat (palpitations)  Byron last reviewed this educational content on 7/1/2019 2000-2021 [employer]. All rights reserved. This information is not intended as a substitute for professional medical care. Always follow your healthcare professional's instructions.          Understanding USDA MyPlate  The USDA has guidelines to help you make healthy food choices. These are called MyPlate. MyPlate shows the food groups that make up healthy meals using the image of a place setting. Before you eat, think about the healthiest choices for what to put on your plate or in your cup or bowl. To learn more about building a healthy plate, visit www.choosemyplate.gov.    The food groups    Fruits. Any fruit or 100% fruit juice counts as part of the Fruit Group. Fruits may be fresh, canned, frozen, or dried, and may be whole, cut-up, or pureed. Make 1/2 of your plate fruits and vegetables.    Vegetables. Any vegetable or 100% vegetable juice counts as a member of the Vegetable Group. Vegetables may be fresh, frozen, canned, or dried. They can be served raw or cooked and may be  whole, cut-up, or mashed. Make 1/2 of your plate fruits and vegetables.    Grains. All foods made from grains are part of the Grains Group. These include wheat, rice, oats, cornmeal, and barley. Grains are often used to make foods such as bread, pasta, oatmeal, cereal, tortillas, and grits. Grains should be no more than 1/4 of your plate. At least half of your grains should be whole grains.    Protein. This group includes meat, poultry, seafood, beans and peas, eggs, processed soy products (such as tofu), nuts (including nut butters), and seeds. Make protein choices no more than 1/4 of your plate. Meat and poultry choices should be lean or low fat.    Dairy. The Dairy Group includes all fluid milk products and foods made from milk that contain calcium, such as yogurt and cheese. (Foods that have little calcium, such as cream, butter, and cream cheese, are not part of this group.) Most dairy choices should be low-fat or fat-free.    Oils. Oils aren't a food group, but they do contain essential nutrients. However it's important to watch your intake of oils. These are fats that are liquid at room temperature. They include canola, corn, olive, soybean, vegetable, and sunflower oil. Foods that are mainly oil include mayonnaise, certain salad dressings, and soft margarines. You likely already get your daily oil allowance from the foods you eat.  Things to limit  Eating healthy also means limiting these things in your diet:       Salt (sodium). Many processed foods have a lot of sodium. To keep sodium intake down, eat fresh vegetables, meats, poultry, and seafood when possible. Purchase low-sodium, reduced-sodium, or no-salt-added food products at the store. And don't add salt to your meals at home. Instead, season them with herbs and spices such as dill, oregano, cumin, and paprika. Or try adding flavor with lemon or lime zest and juice.    Saturated fat. Saturated fats are most often found in animal products such as  beef, pork, and chicken. They are often solid at room temperature, such as butter. To reduce your saturated fat intake, choose leaner cuts of meat and poultry. And try healthier cooking methods such as grilling, broiling, roasting, or baking. For a simple lower-fat swap, use plain nonfat yogurt instead of mayonnaise when making potato salad or macaroni salad.    Added sugars. These are sugars added to foods. They are in foods such as ice cream, candy, soda, fruit drinks, sports drinks, energy drinks, cookies, pastries, jams, and syrups. Cut down on added sugars by sharing sweet treats with a family member or friend. You can also choose fruit for dessert, and drink water or other unsweetened beverages.     [employer] last reviewed this educational content on 6/1/2020 2000-2021 [employer]. All rights reserved. This information is not intended as a substitute for professional medical care. Always follow your healthcare professional's instructions.          Urinary Incontinence, Female (Adult)   Urinary incontinence means loss of bladder control. This problem affects many women, especially as they get older. If you have incontinence, you may be embarrassed to ask for help. But know that this problem can be treated.   Types of Incontinence  There are different types of incontinence. Two of the main types are described here. You can have more than one type.     Stress incontinence. With this type, urine leaks when pressure (stress) is put on the bladder. This may happen when you cough, sneeze, or laugh. Stress incontinence most often occurs because the pelvic floor muscles that support the bladder and urethra are weak. This can happen after pregnancy and vaginal childbirth or a hysterectomy. It can also be due to excess body weight or hormone changes.    Urge incontinence (also called overactive bladder). With this type, a sudden urge to urinate is felt often. This may happen even though there may not be  much urine in the bladder. The need to urinate often during the night is common. Urge incontinence most often occurs because of bladder spasms. This may be due to bladder irritation or infection. Damage to bladder nerves or pelvic muscles, constipation, and certain medicines can also lead to urge incontinence.  Treatment depends on the cause. Further evaluation is needed to find the type you have. This will likely include an exam and certain tests. Based on the results, you and your healthcare provider can then plan treatment. Until a diagnosis is made, the home care tips below can help ease symptoms.   Home care    Do pelvic floor muscle exercises, if they are prescribed. The pelvic floor muscles help support the bladder and urethra. Many women find that their symptoms improve when doing special exercises that strengthen these muscles. To do the exercises, contract the muscles you would use to stop your stream of urine. But do this when you re not urinating. Hold for 10 seconds, then relax. Repeat 10 to 20 times in a row, at least 3 times a day. Your healthcare provider may give you other instructions for how to do the exercises and how often.    Keep a bladder diary. This helps track how often and how much you urinate over a set period of time. Bring this diary with you to your next visit with the provider. The information can help your provider learn more about your bladder problem.    Lose weight, if advised to by your provider. Extra weight puts pressure on the bladder. Your provider can help you create a weight-loss plan that s right for you. This may include exercising more and making certain diet changes.    Don't have foods and drinks that may irritate the bladder. These can include alcohol and caffeinated drinks.    Quit smoking. Smoking and other tobacco use can lead to a long-term (chronic) cough that strains the pelvic floor muscles. Smoking may also damage the bladder and urethra. Talk with your  provider about treatments or methods you can use to quit smoking.    If drinking large amounts of fluid makes you have symptoms, you may be advised to limit your fluid intake. You may also be advised to drink most of your fluids during the day and to limit fluids at night.    If you re worried about urine leakage or accidents, you may wear absorbent pads to catch urine. Change the pads often. This helps reduce discomfort. It may also reduce the risk of skin or bladder infections.    Follow-up care  Follow up with your healthcare provider, or as directed. It may take some to find the right treatment for your problem. But healthy lifestyle changes can be made right away. These include such things as exercising on a regular basis, eating a healthy diet, losing weight (if needed), and quitting smoking. Your treatment plan may include special therapies or medicines. Certain procedures or surgery may also be options. Talk about any questions you have with your provider.   When to seek medical advice  Call the healthcare provider right away if any of these occur:    Fever of 100.4 F (38 C) or higher, or as directed by your provider    Bladder pain or fullness    Belly swelling    Nausea or vomiting    Back pain    Weakness, dizziness, or fainting  Byron last reviewed this educational content on 1/1/2020 2000-2021 [employer]. All rights reserved. This information is not intended as a substitute for professional medical care. Always follow your healthcare professional's instructions.        Your Health Risk Assessment indicates you feel you are not in good emotional health.    Recreation   Recreation is not limited to sports and team events. It includes any activity that provides relaxation, interest, enjoyment, and exercise. Recreation provides an outlet for physical, mental, and social energy. It can give a sense of worth and achievement. It can help you stay healthy.    Mental Exercise and Social  Involvement  Mental and emotional health is as important as physical health. Keep in touch with friends and family. Stay as active as possible. Continue to learn and challenge yourself.   Things you can do to stay mentally active are:    Learn something new, like a foreign language or musical instrument.     Play SCRABBLE or do crossword puzzles. If you cannot find people to play these games with you at home, you can play them with others on your computer through the Internet.     Join a games club--anything from card games to chess or checkers or lawn bowling.     Start a new hobby.     Go back to school.     Volunteer.     Read.   Keep up with world events.

## 2022-05-04 LAB
ANION GAP SERPL CALCULATED.3IONS-SCNC: 5 MMOL/L (ref 3–14)
BUN SERPL-MCNC: 37 MG/DL (ref 7–30)
CALCIUM SERPL-MCNC: 10.4 MG/DL (ref 8.5–10.1)
CHLORIDE BLD-SCNC: 104 MMOL/L (ref 94–109)
CHOLEST SERPL-MCNC: 141 MG/DL
CO2 SERPL-SCNC: 28 MMOL/L (ref 20–32)
CREAT SERPL-MCNC: 1.52 MG/DL (ref 0.52–1.04)
EBV DNA COPIES/ML, INSTRUMENT: 2683 COPIES/ML
EBV DNA SPEC NAA+PROBE-LOG#: 3.4 {LOG_COPIES}/ML
FASTING STATUS PATIENT QL REPORTED: NO
GFR SERPL CREATININE-BSD FRML MDRD: 37 ML/MIN/1.73M2
GLUCOSE BLD-MCNC: 110 MG/DL (ref 70–99)
HDLC SERPL-MCNC: 56 MG/DL
LDLC SERPL CALC-MCNC: 66 MG/DL
NONHDLC SERPL-MCNC: 85 MG/DL
POTASSIUM BLD-SCNC: 4.3 MMOL/L (ref 3.4–5.3)
SODIUM SERPL-SCNC: 137 MMOL/L (ref 133–144)
TRIGL SERPL-MCNC: 96 MG/DL

## 2022-05-04 NOTE — RESULT ENCOUNTER NOTE
Dear Maribel,   Your test results are all back -   Cholesterol looks great.  Let us know if you have any questions.  -Lisa Martinez, DO

## 2022-05-05 ENCOUNTER — HOSPITAL ENCOUNTER (OUTPATIENT)
Facility: AMBULATORY SURGERY CENTER | Age: 72
End: 2022-05-05
Attending: STUDENT IN AN ORGANIZED HEALTH CARE EDUCATION/TRAINING PROGRAM
Payer: COMMERCIAL

## 2022-05-05 ENCOUNTER — TELEPHONE (OUTPATIENT)
Dept: GASTROENTEROLOGY | Facility: CLINIC | Age: 72
End: 2022-05-05
Payer: COMMERCIAL

## 2022-05-05 NOTE — TELEPHONE ENCOUNTER
Screening Questions  BlueKIND OF PREP RedLOCATION [review exclusion criteria] GreenSEDATION TYPE  1. Have you had a positive covid test in the last 90 days? n     2. Do you have a legal guardian or medical Power of ?  Are you able to give consent for your medical care?Y (Sedation review/consideration needed)    3. Are you active on mychart? Y    4. What insurance is in the chart? [employer]     3.   Ordering/Referring Provider: Lisa Martinez    4. BMI 17.04 [BMI OVER 40-EXTENDED PREP]  If greater than 40 review exclusion criteria [PAC APPT IF @ [facility]]        5.  Respiratory Screening :  [If yes to any of the following HOSPITAL setting only]     Do you use daily home oxygen? N    Do you have mod to severe Obstructive Sleep Apnea? N  [[facility]]   Do you have Pulmonary Hypertension? N     Do you have UNCONTROLLED asthma? N        6.   Have you had a heart or lung transplant? N      7.   Are you currently on dialysis? N [ If yes, G-PREP & HOSPITAL setting only]     8.   Do you have chronic kidney disease? N [ If yes, G-PREP ]    9.   Have you had a stroke or Transient ischemic attack (TIA - aka  mini stroke ) within 6 months?  N (If yes, please review exclusion criteria)    10.   In the past 6 months, have you had any heart related issues including cardiomyopathy or heart attack? N /Did have a Heart Attack 4/21           If yes, did it require cardiac stenting or other implantable device? Does have stents    11.   Do you have any implantable devices in your body (pacemaker, defib, LVAD)? N (If yes, please review exclusion criteria)    12.   Do you take nitroglycerin? n           If yes, how often? n  (if yes, HOSPITAL setting ONLY)    13.   Are you currently taking any blood thinners? Y Plavix           [IF YES, INFORM PATIENT TO FOLLOW UP W/ ORDERING PROVIDER FOR BRIDGING INSTRUCTIONS]     14.   Do you have a diagnosis of diabetes? n   [ If yes, G-PREP ]    15.   [FEMALES] Are you  currently pregnant?     If yes, how many weeks?     16.   Are you taking any prescription pain medications on a routine schedule?  n  [ If yes, EXTENDED PREP.] [If yes, MAC]    17.   Do you have any chemical dependencies such as alcohol, street drugs, or methadone?  n [If yes, MAC]    18.   Do you have any history of post-traumatic stress syndrome, severe anxiety or history of psychosis?  PTSD [If yes, MAC]    19.   Do you transfer independently?  y    20.  On a regular basis do you go 3-5 days between bowel movements? N   [ If yes, EXTENDED PREP.]    21.   Preferred LOCAL Pharmacy for Pre Prescription   [id number] [address] AT [address] [place] [address] & [facility]      Scheduling Details      Caller : Maribel Yang  (Please ask for phone number if not scheduled by patient)    Type of Procedure Scheduled: colon  Which Colonoscopy Prep was Sent?: mirlax  KHORUTS CF PATIENTS & GROEN'S PATIENTS NEEDS EXTENDED PREP  Surgeon: héctor  Date of Procedure: 6/9/22  Location: [facility] [place]      Sedation Type: MAC  Conscious Sedation- Needs  for 6 hours after the procedure  MAC/General-Needs  for 24 hours after procedure    Pre-op Required at [facility] [place], [place] and OR for MAC sedation:   (advise patient they will need a pre-op prior to procedure -)      Informed patient they will need an adult  y  Cannot take any type of public or medical transportation alone    Pre-Procedure Covid test to be completed at [facility] or Externally: [facility] 6/6    Confirmed Nurse will call to complete assessment Y    Additional comments:

## 2022-05-12 NOTE — TELEPHONE ENCOUNTER
Diagnosis, Referred by & from: Follow-Up Anoscopy, prior RDM pt   Appt date: 8/10/2022   NOTES STATUS DETAILS   OFFICE NOTE from referring provider N/A    OFFICE NOTE from other specialist Internal [facility]:  5/3/22 - PCC OV with Dr. Martinez    [facility]:  3/29/22 - ONC OV with JUAN Cadet  2/8/22 - CR OV with Dr. Leo  9/2/20 - CR OV with Vianney Lopez NP   DISCHARGE SUMMARY from hospital N/A    DISCHARGE REPORT from the ER Internal [facility]:  6/13/21 - ED OV with Dr. Miller   OPERATIVE REPORT Internal [facility]:  3/14/18 - OP Note for ANAL EXAM, EXCISION OF ANAL LESION, PROCTOSCOPY with Dr. Leo  7/15/14 - OP Note for COLPOSCOPY, ANAL MICROSCOPY, INTERNAL HEMORRHOIDAL BANDING with Dr. Renteria  12/18/09 - OP Note for EXCISION AND FULGURATION OF ANAL RECTAL CONDYLOMATA, ANAL BIOPSY with Dr. Kim   MEDICATION LIST Internal    LABS     ANAL PAP/CEA [facility]:  12/14/17 - Anal Pap (Case: [id number])  5/26/16 - Anal Pap (Case: [id number])  5/22/14 - Anal Pap (Case: [id number])  * More in Epic   BIOPSIES/PATHOLOGY RELATED TO DIAGNOSIS [facility]:  7/28/22 - Colon Biopsy (Case: [id number])  3/14/18 - Anal Biopsy (Case: [id number])  2/15/18 - Anal Biopsy (Case: [id number])  12/14/17 - Perianal Biopsy (Case: [id number])  * More in Epic   DIAGNOSTIC PROCEDURES     COLONOSCOPY [facility]:  7/28/22 - Colonoscopy  8/9/17 - Colonoscopy   IMAGING (DISC & REPORT)      CT [facility]:  8/25/21 - CTA Chest/Abd/Pelvis  4/23/21 - CT Abd/Pelvis  3/22/21 - CTA Chest/Abd/Pelvis  3/1/21 - CT Chest/Abd/Pelvis  12/13/20 - CT Chest/Abd/Pelvis  4/12/18 - PET/CT  10/7/16 - PET/CT  * Additional CTs in Epic/PACs   MRI Internal [facility]:  8/10/18 - MRI Pelvis  4/17/18 - MRI Pelvis  2/28/18 - MRI Pelvis   XRAY Internal [facility]:  7/20/21 - XR Abdomen

## 2022-05-27 ENCOUNTER — TELEPHONE (OUTPATIENT)
Dept: GASTROENTEROLOGY | Facility: CLINIC | Age: 72
End: 2022-05-27

## 2022-05-27 DIAGNOSIS — Z12.11 ENCOUNTER FOR SCREENING COLONOSCOPY: Primary | ICD-10-CM

## 2022-05-27 RX ORDER — BISACODYL 5 MG/1
TABLET, DELAYED RELEASE ORAL
Qty: 4 TABLET | Refills: 0 | Status: SHIPPED | OUTPATIENT
Start: 2022-05-27 | End: 2022-07-20

## 2022-05-27 NOTE — TELEPHONE ENCOUNTER
Attempted to contact patient regarding upcoming Colonoscopy procedure on 6/9/22 for pre assessment questions.  No answer.  Left message to return call to [phone] #2    Patient scheduled for Colonoscopy on 6/9/22.     Covid test scheduled: 6/6/22    Arrival time: 8:10am    [place] [facility] [place]    Sedation type: MAC    Indication for procedure: Screening, previous polyps    Anticoagulations? Plavix Holding interval of 5 days    Bowel prep recommendation: GoLytely -- Kidney transplant and CKD    GoLytely prep sent to [facility] #[id number] [address] AT [address] [place] [address]. Prep instructions sent via APProtect    Pre visit planning completed.    Cassie Cuenca RN

## 2022-05-27 NOTE — TELEPHONE ENCOUNTER
Pre assessment questions completed for upcoming Colonoscopy procedure scheduled on 6/9/22    COVID test scheduled 6/6/22    Reviewed procedural arrival time 8:10am and [facility].    Designated  policy reviewed. Instructed to have someone stay 24 hours post procedure.     Procedure indication: Screening, previous polyps    Bowel prep recommendation: GoLytely -- post kidney transplant/CKD    GoLytely prep script sent to See below pharmacy. Prep instructions sent via Megathread.    Reviewed Colonoscopy prep instructions with patient. No fiber/iron supplements or foods that contain nuts/seeds 7 days prior to procedure.     Anticoagulation/blood thinners? None    Electronic implanted devices? None    Patient verbalized understanding and had no questions or concerns at this time.    Cassie Cuenca RN

## 2022-05-31 DIAGNOSIS — I50.9 CONGESTIVE HEART FAILURE, UNSPECIFIED HF CHRONICITY, UNSPECIFIED HEART FAILURE TYPE (H): ICD-10-CM

## 2022-05-31 DIAGNOSIS — I20.1 CORONARY ARTERY VASOSPASM (H): ICD-10-CM

## 2022-05-31 RX ORDER — CLOPIDOGREL BISULFATE 75 MG/1
75 TABLET ORAL DAILY
Qty: 90 TABLET | Refills: 3 | Status: SHIPPED | OUTPATIENT
Start: 2022-05-31 | End: 2023-01-01

## 2022-06-01 DIAGNOSIS — I10 ESSENTIAL HYPERTENSION: ICD-10-CM

## 2022-06-01 DIAGNOSIS — I21.3 ST ELEVATION MYOCARDIAL INFARCTION (STEMI), UNSPECIFIED ARTERY (H): ICD-10-CM

## 2022-06-01 DIAGNOSIS — I50.9 CONGESTIVE HEART FAILURE, UNSPECIFIED HF CHRONICITY, UNSPECIFIED HEART FAILURE TYPE (H): ICD-10-CM

## 2022-06-01 DIAGNOSIS — I71.40 ABDOMINAL AORTIC ANEURYSM (AAA) WITHOUT RUPTURE (H): ICD-10-CM

## 2022-06-01 DIAGNOSIS — Z94.0 KIDNEY REPLACED BY TRANSPLANT: Primary | ICD-10-CM

## 2022-06-01 DIAGNOSIS — Z11.59 ENCOUNTER FOR SCREENING FOR OTHER VIRAL DISEASES: Primary | ICD-10-CM

## 2022-06-01 DIAGNOSIS — I73.9 PERIPHERAL ARTERY DISEASE (H): ICD-10-CM

## 2022-06-01 RX ORDER — PREDNISONE 5 MG/1
5 TABLET ORAL DAILY
Qty: 90 TABLET | Refills: 3 | Status: SHIPPED | OUTPATIENT
Start: 2022-06-01 | End: 2023-01-01

## 2022-06-01 RX ORDER — ASPIRIN 81 MG/1
81 TABLET, CHEWABLE ORAL DAILY
Qty: 90 TABLET | Refills: 3 | Status: SHIPPED | OUTPATIENT
Start: 2022-06-01 | End: 2023-01-01

## 2022-06-01 RX ORDER — ATORVASTATIN CALCIUM 80 MG/1
80 TABLET, FILM COATED ORAL DAILY
Qty: 90 TABLET | Refills: 3 | Status: SHIPPED | OUTPATIENT
Start: 2022-06-01 | End: 2023-01-01

## 2022-06-01 NOTE — TELEPHONE ENCOUNTER
Prednisone refill request submitted to pharmacy.    Aspirin to be filled by PCP or obtain OTC.     Jia Ashley RN, BSN  Solid Organ Transplant, Post Kidney and Pancreas  Transplant Care Coordinator  [phone]

## 2022-06-03 NOTE — TELEPHONE ENCOUNTER
hydrALAZINE (APRESOLINE) 10 MG tablet  Last Written Prescription Date:   10/27/2021  Last Fill Quantity: 270,   # refills: 1  Last Office Visit : 3/25/2022  Future Office visit:   6/22/2022  Routing refill request to provider for review/approval because:  Refer to clinic for review     lisinopril (ZESTRIL) 2.5 MG tablet  Last Written Prescription Date:   10/27/2021  Last Fill Quantity: 90,   # refills: 1  Last Office Visit : 3/25/2022  Future Office visit:   6/22/2022  Routing refill request to provider for review/approval because:  Abnormal Creatinine  Refer to clinic for review     Recent Labs   Lab Test 05/03/22  1459 04/06/21  2144 03/22/21  1416   CR 1.52*   < >  --    CREAT  --   --  1.6*       Radha Agrawal RN  Central Triage Red Flags/Med Refills

## 2022-06-04 NOTE — TELEPHONE ENCOUNTER
lisinopril (ZESTRIL) 2.5 MG    Last Written Prescription Date:  10/4/21  Last Fill Quantity: 90,   # refills: 1  Last Office Visit : 3/25/22  Future Office visit:  6/22/22  Routing refill request to provider for review/approval because:   ABN CR  /  REVIEW   Creatinine   Date Value Ref Range Status   05/03/2022 1.52 (H) 0.52 - 1.04 mg/dL Final   07/09/2021 1.49 (H) 0.52 - 1.04 mg/dL Final

## 2022-06-06 ENCOUNTER — LAB (OUTPATIENT)
Dept: LAB | Facility: CLINIC | Age: 72
End: 2022-06-06
Payer: COMMERCIAL

## 2022-06-06 DIAGNOSIS — Z11.59 ENCOUNTER FOR SCREENING FOR OTHER VIRAL DISEASES: ICD-10-CM

## 2022-06-06 PROCEDURE — U0003 INFECTIOUS AGENT DETECTION BY NUCLEIC ACID (DNA OR RNA); SEVERE ACUTE RESPIRATORY SYNDROME CORONAVIRUS 2 (SARS-COV-2) (CORONAVIRUS DISEASE [COVID-19]), AMPLIFIED PROBE TECHNIQUE, MAKING USE OF HIGH THROUGHPUT TECHNOLOGIES AS DESCRIBED BY CMS-2020-01-R: HCPCS | Mod: 90 | Performed by: PATHOLOGY

## 2022-06-06 PROCEDURE — U0005 INFEC AGEN DETEC AMPLI PROBE: HCPCS | Mod: 90 | Performed by: PATHOLOGY

## 2022-06-06 PROCEDURE — 99000 SPECIMEN HANDLING OFFICE-LAB: CPT | Performed by: PATHOLOGY

## 2022-06-06 RX ORDER — LISINOPRIL 2.5 MG/1
TABLET ORAL
Qty: 90 TABLET | Refills: 1 | Status: SHIPPED | OUTPATIENT
Start: 2022-06-06 | End: 2022-06-07

## 2022-06-06 RX ORDER — LISINOPRIL 2.5 MG/1
2.5 TABLET ORAL DAILY
Qty: 90 TABLET | OUTPATIENT
Start: 2022-06-06

## 2022-06-06 RX ORDER — HYDRALAZINE HYDROCHLORIDE 10 MG/1
10 TABLET, FILM COATED ORAL 3 TIMES DAILY
Qty: 270 TABLET | Refills: 0 | Status: SHIPPED | OUTPATIENT
Start: 2022-06-06 | End: 2022-06-07

## 2022-06-07 LAB — SARS-COV-2 RNA RESP QL NAA+PROBE: NEGATIVE

## 2022-06-07 RX ORDER — HYDRALAZINE HYDROCHLORIDE 10 MG/1
10 TABLET, FILM COATED ORAL 3 TIMES DAILY
Qty: 270 TABLET | Refills: 3 | Status: SHIPPED | OUTPATIENT
Start: 2022-06-07 | End: 2023-01-01

## 2022-06-07 RX ORDER — ONDANSETRON 2 MG/ML
4 INJECTION INTRAMUSCULAR; INTRAVENOUS
Status: CANCELLED | OUTPATIENT
Start: 2022-06-07

## 2022-06-07 RX ORDER — SODIUM CHLORIDE, SODIUM LACTATE, POTASSIUM CHLORIDE, CALCIUM CHLORIDE 600; 310; 30; 20 MG/100ML; MG/100ML; MG/100ML; MG/100ML
INJECTION, SOLUTION INTRAVENOUS CONTINUOUS
Status: CANCELLED | OUTPATIENT
Start: 2022-06-07

## 2022-06-07 RX ORDER — LISINOPRIL 2.5 MG/1
2.5 TABLET ORAL DAILY
Qty: 90 TABLET | Refills: 3 | Status: SHIPPED | OUTPATIENT
Start: 2022-06-07 | End: 2023-01-01

## 2022-06-07 RX ORDER — LIDOCAINE 40 MG/G
CREAM TOPICAL
Status: CANCELLED | OUTPATIENT
Start: 2022-06-07

## 2022-06-10 ENCOUNTER — TELEPHONE (OUTPATIENT)
Dept: GASTROENTEROLOGY | Facility: CLINIC | Age: 72
End: 2022-06-10
Payer: COMMERCIAL

## 2022-06-10 NOTE — TELEPHONE ENCOUNTER
Outbound Call made to: Maribel Yang     Procedure: COLONOSCOPY     Date, Location, and Surgeon of Procedure Cancelled:   06/23/2022 - [facility]       Reason for call (please be detailed, any staff messages or encounters to note?):     Lizett Cisneros, RN  P Endoscopy Scheduling Pool  Patient EF too low to be done at the [facility], please reschedule ASAP at the [facility]           Rescheduled:     YES      If rescheduled:    Date: 07/28/2022   Location: [facility]    Note any change or update to original order/sedation: N/A

## 2022-06-13 NOTE — TELEPHONE ENCOUNTER
FUTURE VISIT INFORMATION      SURGERY INFORMATION:    Date: 22    Location:  [facility]    Surgeon:  Moise Corcoran MD    Anesthesia Type:  MAC    Procedure: COLONOSCOPY    RECORDS REQUESTED FROM:        Primary Care Provider: Lisa Martinez DO- guera    Pertinent Medical History: hypertension, peripheral artery disease, AAA, CAD    Most recent EKG+ Tracin21    Most recent ECHO: 21    Most recent Coronary Angiogram: 21    Most recent PFT's: 14

## 2022-06-20 DIAGNOSIS — I50.9 CONGESTIVE HEART FAILURE, UNSPECIFIED HF CHRONICITY, UNSPECIFIED HEART FAILURE TYPE (H): Primary | ICD-10-CM

## 2022-06-22 ENCOUNTER — OFFICE VISIT (OUTPATIENT)
Dept: CARDIOLOGY | Facility: CLINIC | Age: 72
End: 2022-06-22
Attending: NURSE PRACTITIONER
Payer: COMMERCIAL

## 2022-06-22 ENCOUNTER — LAB (OUTPATIENT)
Dept: LAB | Facility: CLINIC | Age: 72
End: 2022-06-22
Payer: COMMERCIAL

## 2022-06-22 VITALS
BODY MASS INDEX: 16.95 KG/M2 | WEIGHT: 89.8 LBS | OXYGEN SATURATION: 98 % | HEART RATE: 74 BPM | DIASTOLIC BLOOD PRESSURE: 110 MMHG | SYSTOLIC BLOOD PRESSURE: 165 MMHG | HEIGHT: 61 IN

## 2022-06-22 DIAGNOSIS — I50.9 CONGESTIVE HEART FAILURE, UNSPECIFIED HF CHRONICITY, UNSPECIFIED HEART FAILURE TYPE (H): ICD-10-CM

## 2022-06-22 DIAGNOSIS — N18.30 STAGE 3 CHRONIC KIDNEY DISEASE, UNSPECIFIED WHETHER STAGE 3A OR 3B CKD (H): ICD-10-CM

## 2022-06-22 DIAGNOSIS — I50.9 CONGESTIVE HEART FAILURE, UNSPECIFIED HF CHRONICITY, UNSPECIFIED HEART FAILURE TYPE (H): Primary | ICD-10-CM

## 2022-06-22 LAB
ANION GAP SERPL CALCULATED.3IONS-SCNC: 9 MMOL/L (ref 3–14)
BUN SERPL-MCNC: 26 MG/DL (ref 7–30)
CALCIUM SERPL-MCNC: 9.5 MG/DL (ref 8.5–10.1)
CHLORIDE BLD-SCNC: 103 MMOL/L (ref 94–109)
CO2 SERPL-SCNC: 28 MMOL/L (ref 20–32)
CREAT SERPL-MCNC: 1.53 MG/DL (ref 0.52–1.04)
FERRITIN SERPL-MCNC: 82 NG/ML (ref 8–252)
GFR SERPL CREATININE-BSD FRML MDRD: 36 ML/MIN/1.73M2
GLUCOSE BLD-MCNC: 145 MG/DL (ref 70–99)
IRON SATN MFR SERPL: 12 % (ref 15–46)
IRON SERPL-MCNC: 30 UG/DL (ref 35–180)
POTASSIUM BLD-SCNC: 4 MMOL/L (ref 3.4–5.3)
SODIUM SERPL-SCNC: 140 MMOL/L (ref 133–144)
TIBC SERPL-MCNC: 250 UG/DL (ref 240–430)

## 2022-06-22 PROCEDURE — 82728 ASSAY OF FERRITIN: CPT | Performed by: PATHOLOGY

## 2022-06-22 PROCEDURE — 83550 IRON BINDING TEST: CPT | Performed by: PATHOLOGY

## 2022-06-22 PROCEDURE — 99214 OFFICE O/P EST MOD 30 MIN: CPT | Performed by: NURSE PRACTITIONER

## 2022-06-22 PROCEDURE — 80048 BASIC METABOLIC PNL TOTAL CA: CPT | Performed by: PATHOLOGY

## 2022-06-22 PROCEDURE — 36415 COLL VENOUS BLD VENIPUNCTURE: CPT | Performed by: PATHOLOGY

## 2022-06-22 PROCEDURE — G0463 HOSPITAL OUTPT CLINIC VISIT: HCPCS

## 2022-06-22 RX ORDER — HEPARIN SODIUM,PORCINE 10 UNIT/ML
5 VIAL (ML) INTRAVENOUS
Status: CANCELLED | OUTPATIENT
Start: 2022-06-22

## 2022-06-22 RX ORDER — MEPERIDINE HYDROCHLORIDE 25 MG/ML
25 INJECTION INTRAMUSCULAR; INTRAVENOUS; SUBCUTANEOUS EVERY 30 MIN PRN
Status: CANCELLED | OUTPATIENT
Start: 2022-06-22

## 2022-06-22 RX ORDER — HEPARIN SODIUM (PORCINE) LOCK FLUSH IV SOLN 100 UNIT/ML 100 UNIT/ML
5 SOLUTION INTRAVENOUS
Status: CANCELLED | OUTPATIENT
Start: 2022-06-22

## 2022-06-22 RX ORDER — METHYLPREDNISOLONE SODIUM SUCCINATE 125 MG/2ML
125 INJECTION, POWDER, LYOPHILIZED, FOR SOLUTION INTRAMUSCULAR; INTRAVENOUS
Status: CANCELLED
Start: 2022-06-22

## 2022-06-22 RX ORDER — EPINEPHRINE 1 MG/ML
0.3 INJECTION, SOLUTION, CONCENTRATE INTRAVENOUS EVERY 5 MIN PRN
Status: CANCELLED | OUTPATIENT
Start: 2022-06-22

## 2022-06-22 RX ORDER — ALBUTEROL SULFATE 90 UG/1
1-2 AEROSOL, METERED RESPIRATORY (INHALATION)
Status: CANCELLED
Start: 2022-06-22

## 2022-06-22 RX ORDER — NALOXONE HYDROCHLORIDE 0.4 MG/ML
0.2 INJECTION, SOLUTION INTRAMUSCULAR; INTRAVENOUS; SUBCUTANEOUS
Status: CANCELLED | OUTPATIENT
Start: 2022-06-22

## 2022-06-22 RX ORDER — DIPHENHYDRAMINE HYDROCHLORIDE 50 MG/ML
50 INJECTION INTRAMUSCULAR; INTRAVENOUS
Status: CANCELLED
Start: 2022-06-22

## 2022-06-22 RX ORDER — ALBUTEROL SULFATE 0.83 MG/ML
2.5 SOLUTION RESPIRATORY (INHALATION)
Status: CANCELLED | OUTPATIENT
Start: 2022-06-22

## 2022-06-22 ASSESSMENT — PAIN SCALES - GENERAL: PAINLEVEL: NO PAIN (0)
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
## 2022-06-22 NOTE — PATIENT INSTRUCTIONS
Take your medicines every day, as directed    Changes made today:  When you get home, take your hydralazine dose. Take your blood pressure 30 minutes after taking this.  If this blood pressure is still above 150 systolic (top number), take another 2.5mg lisinopril tablet.  Check your blood pressure tomorrow morning. I will call you to check in on this.   Monitor Your Weight and Symptoms    Contact us if you:    Gain 2 pounds in one day or 5 pounds in one week  Feel more short of breath  Notice more leg swelling  Feel lightheadeded   Change your lifestyle    Limit Salt or Sodium:  2000 mg  Limit Fluids:  2000 mL or approximately 64 ounces  Eat a Heart Healthy Diet  Low in saturated fats  Stay Active:  Aim to move at least 150 minutes every  week         To Contact us    During Business Hours:  [phone], option # 1      After hours, weekends or holidays:   [phone], Option #4  Ask to speak to the On-Call Cardiologist. Inform them you are a CORE/heart failure patient at the [facility].     Use [employer] allows you to communicate directly with your heart team through secure messaging.  [employer] can be accessed any time on your phone, computer, or tablet.  If you need assistance, we'd be happy to help!         Keep your Heart Appointments:    Iron infusions. You can call [phone], #3, #2 to schedule. Otherwise their team will call you to set this up.    Follow up with Marguerite in 1 month with echo prior

## 2022-06-22 NOTE — NURSING NOTE
Chief Complaint   Patient presents with     Follow Up     71 year old female presents with systolic heart failure, ef 35-40% for 3 month follow up with labs prior        Vitals were taken and medications reconciled.    Torey Coronado, EMT  4:03 PM
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
## 2022-06-22 NOTE — NURSING NOTE
Diet: Patient instructed regarding a heart failure healthy diet, including discussion of reduced fat and 2000 mg daily sodium restriction, daily weights, medication purpose and compliance, fluid restrictions and resources for patient and family to access for assistance with heart failure management.       Labs: Patient was given results of the laboratory testing obtained today and patient was instructed about when to return for the next laboratory testing. Repeat soluble transferrin receptor in 1 month.    Med Reconcile: Reviewed and verified all current medications with the patient. The updated medication list was printed and given to the patient. No changes    Return Appointment: Patient given instructions regarding scheduling next clinic visit. RTC for CORE and echo in 1 month  *order for iron infusions    *take hydralazine and wait 30 minutes. Then check bp tonight and if still above 150 systolic, take another lisinopril.     Patient stated she understood all health information given and agreed to call with further questions or concerns.     Carolee Gar RN

## 2022-06-23 ENCOUNTER — MYC MEDICAL ADVICE (OUTPATIENT)
Dept: CARDIOLOGY | Facility: CLINIC | Age: 72
End: 2022-06-23

## 2022-06-23 DIAGNOSIS — D50.9 IRON DEFICIENCY ANEMIA, UNSPECIFIED IRON DEFICIENCY ANEMIA TYPE: Primary | ICD-10-CM

## 2022-06-27 RX ORDER — TORSEMIDE 10 MG/1
TABLET ORAL
Qty: 90 TABLET | Refills: 1 | Status: SHIPPED | OUTPATIENT
Start: 2022-06-27 | End: 2022-07-27

## 2022-06-28 NOTE — TELEPHONE ENCOUNTER
No answer. Will try to get a hold of pt again tomorrow.   
supportive care and hydration   follow up with pediatrician  discussed precautions with parents - precautions for appendicitis

## 2022-06-29 ENCOUNTER — TELEPHONE (OUTPATIENT)
Dept: CARDIOLOGY | Facility: CLINIC | Age: 72
End: 2022-06-29

## 2022-06-29 NOTE — TELEPHONE ENCOUNTER
----- Message from Carolee Gar RN sent at 6/29/2022 10:12 AM CDT -----  Regarding: follow up  Hello!  Maribel will need CORE follow up with echocardiogram and labs prior in 1 month. She usually sees Marguerite Muñoz. Can you please reach out and assist in getting this scheduled?  Thanks  Carolee

## 2022-06-30 PROBLEM — D50.9 ANEMIA, IRON DEFICIENCY: Status: ACTIVE | Noted: 2022-06-30

## 2022-07-07 ENCOUNTER — INFUSION THERAPY VISIT (OUTPATIENT)
Dept: INFUSION THERAPY | Facility: CLINIC | Age: 72
End: 2022-07-07
Attending: NURSE PRACTITIONER
Payer: COMMERCIAL

## 2022-07-07 VITALS
SYSTOLIC BLOOD PRESSURE: 137 MMHG | TEMPERATURE: 97.7 F | OXYGEN SATURATION: 98 % | DIASTOLIC BLOOD PRESSURE: 78 MMHG | BODY MASS INDEX: 18.48 KG/M2 | HEART RATE: 67 BPM | RESPIRATION RATE: 16 BRPM | WEIGHT: 98.5 LBS

## 2022-07-07 DIAGNOSIS — N18.30 STAGE 3 CHRONIC KIDNEY DISEASE, UNSPECIFIED WHETHER STAGE 3A OR 3B CKD (H): ICD-10-CM

## 2022-07-07 DIAGNOSIS — D50.9 IRON DEFICIENCY ANEMIA, UNSPECIFIED IRON DEFICIENCY ANEMIA TYPE: Primary | ICD-10-CM

## 2022-07-07 DIAGNOSIS — I50.9 CONGESTIVE HEART FAILURE, UNSPECIFIED HF CHRONICITY, UNSPECIFIED HEART FAILURE TYPE (H): ICD-10-CM

## 2022-07-07 PROCEDURE — 258N000003 HC RX IP 258 OP 636: Performed by: NURSE PRACTITIONER

## 2022-07-07 PROCEDURE — 250N000011 HC RX IP 250 OP 636: Performed by: NURSE PRACTITIONER

## 2022-07-07 PROCEDURE — 96365 THER/PROPH/DIAG IV INF INIT: CPT

## 2022-07-07 RX ORDER — HEPARIN SODIUM (PORCINE) LOCK FLUSH IV SOLN 100 UNIT/ML 100 UNIT/ML
5 SOLUTION INTRAVENOUS
Status: CANCELLED | OUTPATIENT
Start: 2022-07-14

## 2022-07-07 RX ORDER — ALBUTEROL SULFATE 90 UG/1
1-2 AEROSOL, METERED RESPIRATORY (INHALATION)
Status: CANCELLED
Start: 2022-07-14

## 2022-07-07 RX ORDER — NALOXONE HYDROCHLORIDE 0.4 MG/ML
0.2 INJECTION, SOLUTION INTRAMUSCULAR; INTRAVENOUS; SUBCUTANEOUS
Status: CANCELLED | OUTPATIENT
Start: 2022-07-14

## 2022-07-07 RX ORDER — METHYLPREDNISOLONE SODIUM SUCCINATE 125 MG/2ML
125 INJECTION, POWDER, LYOPHILIZED, FOR SOLUTION INTRAMUSCULAR; INTRAVENOUS
Status: CANCELLED
Start: 2022-07-14

## 2022-07-07 RX ORDER — HEPARIN SODIUM,PORCINE 10 UNIT/ML
5 VIAL (ML) INTRAVENOUS
Status: CANCELLED | OUTPATIENT
Start: 2022-07-14

## 2022-07-07 RX ORDER — EPINEPHRINE 1 MG/ML
0.3 INJECTION, SOLUTION INTRAMUSCULAR; SUBCUTANEOUS EVERY 5 MIN PRN
Status: CANCELLED | OUTPATIENT
Start: 2022-07-14

## 2022-07-07 RX ORDER — MEPERIDINE HYDROCHLORIDE 25 MG/ML
25 INJECTION INTRAMUSCULAR; INTRAVENOUS; SUBCUTANEOUS EVERY 30 MIN PRN
Status: CANCELLED | OUTPATIENT
Start: 2022-07-14

## 2022-07-07 RX ORDER — ALBUTEROL SULFATE 0.83 MG/ML
2.5 SOLUTION RESPIRATORY (INHALATION)
Status: CANCELLED | OUTPATIENT
Start: 2022-07-14

## 2022-07-07 RX ORDER — DIPHENHYDRAMINE HYDROCHLORIDE 50 MG/ML
50 INJECTION INTRAMUSCULAR; INTRAVENOUS
Status: CANCELLED
Start: 2022-07-14

## 2022-07-07 RX ADMIN — FERRIC CARBOXYMALTOSE INJECTION 670.5 MG: 50 INJECTION, SOLUTION INTRAVENOUS at 09:34

## 2022-07-07 NOTE — PROGRESS NOTES
Infusion Nursing Note:  Maribel AMARI Yang presents today for Injectafer 1/2.    Patient seen by provider today: No   present during visit today: Not Applicable.    Note:   -Injectafer infused over ~15 min  -30 min obs    Intravenous Access:  Peripheral IV placed.    Treatment Conditions:  Not Applicable.    Post Infusion Assessment:  Patient tolerated infusion without incident.  Patient observed for 30 minutes post infusion per protocol.  Site patent and intact, free from redness, edema or discomfort.  No evidence of extravasations.  Access discontinued per protocol.     Discharge Plan:   Copy of AVS reviewed with patient and/or family.  Patient will return 7/14/22  for next appointment.  Patient discharged in stable condition accompanied by: self.  Departure Mode: Ambulatory.    Perla Faye, RN    BP (!) 143/94 (BP Location: Left arm)   Pulse 67   Temp 97.7  F (36.5  C) (Oral)   Resp 16   Wt 44.7 kg (98 lb 8 oz)   SpO2 98%   BMI 18.48 kg/m      Administrations This Visit     ferric carboxymaltose (INJECTAFER) 670.5 mg in sodium chloride 0.9 % 123 mL intermittent infusion     Admin Date  07/07/2022 Action  New Bag Dose  670.5 mg Rate  492 mL/hr Route  Intravenous Administered By  Perla Faye, RN

## 2022-07-07 NOTE — LETTER
7/7/2022         RE: Maribel Yang  [address]  [facility] [address]        Dear Colleague,    Thank you for referring your patient, Maribel Yang, to the [facility]. Please see a copy of my visit note below.    Infusion Nursing Note:  Maribel Yang presents today for Injectafer 1/2.    Patient seen by provider today: No   present during visit today: Not Applicable.    Note:   -Injectafer infused over ~15 min  -30 min obs    Intravenous Access:  Peripheral IV placed.    Treatment Conditions:  Not Applicable.    Post Infusion Assessment:  Patient tolerated infusion without incident.  Patient observed for 30 minutes post infusion per protocol.  Site patent and intact, free from redness, edema or discomfort.  No evidence of extravasations.  Access discontinued per protocol.     Discharge Plan:   Copy of AVS reviewed with patient and/or family.  Patient will return 7/14/22  for next appointment.  Patient discharged in stable condition accompanied by: self.  Departure Mode: Ambulatory.    Perla Faye RN    BP (!) 143/94 (BP Location: Left arm)   Pulse 67   Temp 97.7  F (36.5  C) (Oral)   Resp 16   Wt 44.7 kg (98 lb 8 oz)   SpO2 98%   BMI 18.48 kg/m      Administrations This Visit     ferric carboxymaltose (INJECTAFER) 670.5 mg in sodium chloride 0.9 % 123 mL intermittent infusion     Admin Date  07/07/2022 Action  New Bag Dose  670.5 mg Rate  492 mL/hr Route  Intravenous Administered By  Perla Faye, RN                              Again, thank you for allowing me to participate in the care of your patient.        Sincerely,        [facility]

## 2022-07-07 NOTE — PATIENT INSTRUCTIONS
Dear Maribel Yang    Thank you for choosing [facility] ([facility]) for your infusion.  The following information is a summary of our appointment as well as important reminders.      We look forward in seeing you on your next appointment here at [facility] ([facility]).  Please don t hesitate to call us at [phone] to reschedule any of your appointments or to speak with one of the [facility] registered nurses.  It was a pleasure taking care of you today.    Sincerely,    [facility]  [address] [place] [address]  [address]  Phone:  [phone]

## 2022-07-18 ENCOUNTER — PRE VISIT (OUTPATIENT)
Dept: SURGERY | Facility: CLINIC | Age: 72
End: 2022-07-18
Payer: COMMERCIAL

## 2022-07-18 DIAGNOSIS — I50.9 CONGESTIVE HEART FAILURE, UNSPECIFIED HF CHRONICITY, UNSPECIFIED HEART FAILURE TYPE (H): Primary | ICD-10-CM

## 2022-07-19 ENCOUNTER — PRE VISIT (OUTPATIENT)
Dept: SURGERY | Facility: CLINIC | Age: 72
End: 2022-07-19

## 2022-07-20 RX ORDER — BISACODYL 5 MG/1
TABLET, DELAYED RELEASE ORAL
Qty: 4 TABLET | Refills: 0 | Status: SHIPPED | OUTPATIENT
Start: 2022-07-20 | End: 2023-01-01

## 2022-07-20 NOTE — TELEPHONE ENCOUNTER
Rescheduled colonoscopy at [facility].    Pre assessment questions completed for upcoming colonoscopy procedure scheduled on 7.28.2022    Pre-op: 7.22.2022 PAC    Discussed at home rapid antigen COVID test 1-2 days prior to procedure.    Reviewed procedural arrival time 0715 and facility location [facility].    Designated  policy reviewed. Instructed to have someone stay 24 hours post procedure.     Anticoagulation/blood thinners? Plavix; Pt needs to f/u with PCP re: Plavix dosing prior to procedure.    Electronic implanted devices? no    Reviewed Golytely prep instructions with patient. No fiber/iron supplements or foods that contain nuts/seeds prior to procedure.     Patient verbalized understanding and had no questions or concerns at this time.    Kell Ocasio RN

## 2022-07-22 ENCOUNTER — ANESTHESIA EVENT (OUTPATIENT)
Dept: GASTROENTEROLOGY | Facility: CLINIC | Age: 72
End: 2022-07-22
Payer: COMMERCIAL

## 2022-07-22 ENCOUNTER — VIRTUAL VISIT (OUTPATIENT)
Dept: SURGERY | Facility: CLINIC | Age: 72
End: 2022-07-22
Payer: COMMERCIAL

## 2022-07-22 DIAGNOSIS — Z12.11 SPECIAL SCREENING FOR MALIGNANT NEOPLASM OF COLON: ICD-10-CM

## 2022-07-22 DIAGNOSIS — Z01.818 PREOP EXAMINATION: Primary | ICD-10-CM

## 2022-07-22 PROCEDURE — 99205 OFFICE O/P NEW HI 60 MIN: CPT | Mod: 95 | Performed by: CLINICAL NURSE SPECIALIST

## 2022-07-22 ASSESSMENT — PAIN SCALES - GENERAL: PAINLEVEL: NO PAIN (0)

## 2022-07-22 ASSESSMENT — COPD QUESTIONNAIRES: COPD: 1

## 2022-07-22 ASSESSMENT — ENCOUNTER SYMPTOMS
SEIZURES: 0
DYSRHYTHMIAS: 0
ORTHOPNEA: 1

## 2022-07-22 ASSESSMENT — LIFESTYLE VARIABLES: TOBACCO_USE: 1

## 2022-07-22 NOTE — PROGRESS NOTES
Maribel is a 71 year old who is being evaluated via a billable video visit.      How would you like to obtain your AVS? MyChart      HPI         Review of Systems         Objective    Vitals - Patient Reported  Pain Score: No Pain (0)        Physical Exam           EVELIN Cristobal LPN      .  ..

## 2022-07-22 NOTE — PATIENT INSTRUCTIONS
Name:  Maribel Yang   MRN:  [id number]   :  1950   Today's Date:  2022     GI Lab procedures:    A representative from the GI Lab will contact you regarding arrival date and time      You were seen today in the PAC Clinic   (Pre operative Anesthesia Assessment Center)  [address]  [address]   phone [phone]    You had a pre operative assessment done: 22    Anesthesia recommendations for medications:    Hold aspirin for 2 days before procedure -Chrissy is checking to see if you need to hold this at all       Hold ibuprofen for 2 days before procedure   Hold Naproxen for 2 days before procedure       Special instructions for anticoagulation medications:    Hold Plavix-you took last dose 22    Please hold the following medications the day of procedure:    Calcium carbonate -vitamin D (Caltrate)  Culturelle probiotic  Lisinopril (zestril)  Metamucil         Please take these medications the day of procedure:    Acetaminophen (tylenol) as needed  Atorvastatin (lipitor)  Hydralazine (apresoline)  Flonase nasal spray  Isosorbide (Imdur)  Metoprolol tartrate  Mycophenolic acid  Pantoprazole (protonix)  Prednisone   Torsemide (demadex) as needed          For questions or appointments, call:  [facility]: [phone], option 2  Monday through Friday, 8 a.m. to 4:30 p.m.  (If it is after hours please reach out to the clinic or provider you were scheduled with.)

## 2022-07-22 NOTE — OR NURSING
Writer called pt back to ask her to stay on her ASA and not hold it at all per DAYANA instruction.

## 2022-07-22 NOTE — H&P
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 delirium risk factors:  High co-morbid index    ENT  - No current airway concerns.  Will need to be reassessed day of surgery.  Mallampati: Unable to assess  TM: Unable to assess       CARDIAC  Complex cardiovascular history as above, followed closely by Cardiology with plan for updated echocardiogram tomorrow, and Cardiology appt on 7/27/22 before procedure.   Today patient denies chest pain, irregular HR, or ankle edema. She is doing light housework, and goes up and down her stairs multiple times a day. She has chronic DYSPNEA ON EXERTION, reported today at baseline. Patient will take am meds atorvastatin, hydralazine, Imdur, lisinopril, metoprolol on day of procedure with sips. She will take torsemide if needed. She will remain on aspirin up to day of procedure approved by Dr. Stinson. Last dose of Plavix was 7/21/22. PAD: S/P EVAR 4/21  - METS  "(Metabolic Equivalents)~2-3    RCRI: 6.6% risk of serious cardiac events    PULMONARY  Occasionally has a cigarette. COPD, no 02 or inhalers. Denies pulmonary symptoms.   Low risk for MARTITA  History of lung cancer s/p radiation in remission.  - Tobacco History      History   Smoking Status     Light Tobacco Smoker     Packs/day: 0.30     Years: 35.00     Types: Cigarettes     Start date: 1/1/1967     Last attempt to quit: 3/1/2021   Smokeless Tobacco     Never Used     Comment: Couple times a week. 1-2 cigs 1-2x a week.       GI: Occasional acid/heartburn symptoms. Will take Protonix on day of procedure.   PONV Medium Risk  Total Score: 2           1 AN PONV: Pt is Female    1 AN PONV: Patient has history of PONV        /RENAL  - Baseline Creatinine, last 1.53.  - CKD - S/P LRKT - 2004, stable on immunosuppression. Will take prednisone and mycophenolic acid on day of procedure.    ENDOCRINE   - BMI: Estimated body mass index is 18.48 kg/m  as calculated from the following:    Height as of 6/22/22: 1.555 m (5' 1.22\").    Weight as of 7/7/22: 44.7 kg (98 lb 8 oz).  Underweight (BMI < 18.5)  - No history of Diabetes Mellitus   - Some marijuana use to help with appetite and sleep.  - Supplements diet with Ensure.     HEME: VTE risk 4.5%  History of blood clots, last 2020.   Anemia. Last Hgb: 13.9. Receives iron infusions.   History of blood transfusions. History of blood antibody.     MSK: Osteoporosis     Patient was discussed with Dr Bobby     ADDENDUM: 7/26/22    Updated echocardiogram 7/23/22  Interpretation Summary  Global and regional left ventricular function is normal with an EF of 55-60%.  Small focal area of wall thinning and akinesis in the mid septum.  Global right ventricular function is normal.  No significant valvular abnormalities present.  IVC diameter <2.1 cm collapsing >50% with sniff suggests a normal RA pressure  of 3 mmHg.  Small loculated pericardial effusion adjacent to the RV.  Abdominal aortic " aneurysm measuring 4.5 cm with intra-mural thrombus.  Compared to prior, LV fxn has improved. No other change.    The patient is optimized for their procedure. No further diagnostic testing indicated. AVS with information on meds given by nursing staff today. Confirmed with patient that she has received instructions from GI team for date, arrival time, eating and drinking, bowel prep and need for .     Please refer to the physical examination documented by the anesthesiologist in the anesthesia record on the day of surgery.    Video-Visit Details    Type of service:  Video Visit    Patient verbally consented to video service today: YES    Video Start Time: 1:48pm   Video End Time (time video stopped): 2:04pm     Originating Location (pt. Location): Home    Distant Location (provider location):  [facility]     Mode of Communication:  Video Conference via [employer]  On the day of service:     Prep time: 25 minutes  Visit time: 16 minutes  Documentation time: 25 minutes  ------------------------------------------  Total time: 66 minutes      SANDRA West CNS  Preoperative Assessment Center  [facility]  Clinic and Surgery Center  Phone: [phone]  Fax: [phone]

## 2022-07-23 ENCOUNTER — ANCILLARY PROCEDURE (OUTPATIENT)
Dept: CARDIOLOGY | Facility: CLINIC | Age: 72
End: 2022-07-23
Attending: NURSE PRACTITIONER
Payer: COMMERCIAL

## 2022-07-23 DIAGNOSIS — I50.9 CONGESTIVE HEART FAILURE, UNSPECIFIED HF CHRONICITY, UNSPECIFIED HEART FAILURE TYPE (H): ICD-10-CM

## 2022-07-23 LAB — LVEF ECHO: NORMAL

## 2022-07-23 PROCEDURE — 93306 TTE W/DOPPLER COMPLETE: CPT | Performed by: INTERNAL MEDICINE

## 2022-07-27 ENCOUNTER — LAB (OUTPATIENT)
Dept: LAB | Facility: CLINIC | Age: 72
End: 2022-07-27
Payer: COMMERCIAL

## 2022-07-27 ENCOUNTER — OFFICE VISIT (OUTPATIENT)
Dept: CARDIOLOGY | Facility: CLINIC | Age: 72
End: 2022-07-27
Attending: NURSE PRACTITIONER
Payer: COMMERCIAL

## 2022-07-27 VITALS
OXYGEN SATURATION: 93 % | WEIGHT: 87.9 LBS | BODY MASS INDEX: 16.49 KG/M2 | SYSTOLIC BLOOD PRESSURE: 156 MMHG | HEART RATE: 73 BPM | DIASTOLIC BLOOD PRESSURE: 97 MMHG

## 2022-07-27 DIAGNOSIS — I50.9 CONGESTIVE HEART FAILURE, UNSPECIFIED HF CHRONICITY, UNSPECIFIED HEART FAILURE TYPE (H): ICD-10-CM

## 2022-07-27 DIAGNOSIS — N18.30 STAGE 3 CHRONIC KIDNEY DISEASE, UNSPECIFIED WHETHER STAGE 3A OR 3B CKD (H): ICD-10-CM

## 2022-07-27 LAB
ANION GAP SERPL CALCULATED.3IONS-SCNC: 9 MMOL/L (ref 3–14)
BUN SERPL-MCNC: 41 MG/DL (ref 7–30)
CALCIUM SERPL-MCNC: 9.8 MG/DL (ref 8.5–10.1)
CHLORIDE BLD-SCNC: 103 MMOL/L (ref 94–109)
CO2 SERPL-SCNC: 26 MMOL/L (ref 20–32)
CREAT SERPL-MCNC: 1.59 MG/DL (ref 0.52–1.04)
GFR SERPL CREATININE-BSD FRML MDRD: 35 ML/MIN/1.73M2
GLUCOSE BLD-MCNC: 134 MG/DL (ref 70–99)
POTASSIUM BLD-SCNC: 3.9 MMOL/L (ref 3.4–5.3)
SODIUM SERPL-SCNC: 138 MMOL/L (ref 133–144)

## 2022-07-27 PROCEDURE — 84238 ASSAY NONENDOCRINE RECEPTOR: CPT | Mod: 90 | Performed by: PATHOLOGY

## 2022-07-27 PROCEDURE — 99000 SPECIMEN HANDLING OFFICE-LAB: CPT | Performed by: PATHOLOGY

## 2022-07-27 PROCEDURE — 99214 OFFICE O/P EST MOD 30 MIN: CPT | Performed by: NURSE PRACTITIONER

## 2022-07-27 PROCEDURE — 80048 BASIC METABOLIC PNL TOTAL CA: CPT | Performed by: PATHOLOGY

## 2022-07-27 PROCEDURE — G0463 HOSPITAL OUTPT CLINIC VISIT: HCPCS

## 2022-07-27 PROCEDURE — 36415 COLL VENOUS BLD VENIPUNCTURE: CPT | Performed by: PATHOLOGY

## 2022-07-27 RX ORDER — TORSEMIDE 10 MG/1
10 TABLET ORAL DAILY
Qty: 90 TABLET | Refills: 1 | Status: SHIPPED | OUTPATIENT
Start: 2022-07-27 | End: 2022-10-03

## 2022-07-27 ASSESSMENT — PAIN SCALES - GENERAL: PAINLEVEL: NO PAIN (0)
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
## 2022-07-27 NOTE — NURSING NOTE
Chief Complaint   Patient presents with     Follow Up     RTN CORE: 71 year old female presents with systolic heart failure, ef 35-40% for follow up with labs and echo prior      Vitals were taken and medications reconciled.    RODERICK Yeboah  4:18 PM

## 2022-07-27 NOTE — PATIENT INSTRUCTIONS
Take your medicines every day, as directed    Changes made today:  Increase your torsemide to 10mg every day.   Labs in 1 week   Monitor Your Weight and Symptoms    Contact us if you:    Gain 2 pounds in one day or 5 pounds in one week  Feel more short of breath  Notice more leg swelling  Feel lightheadeded   Change your lifestyle    Limit Salt or Sodium:  2000 mg  Limit Fluids:  2000 mL or approximately 64 ounces  Eat a Heart Healthy Diet  Low in saturated fats  Stay Active:  Aim to move at least 150 minutes every  week         To Contact us    During Business Hours:  [phone], option # 1      After hours, weekends or holidays:   [phone], Option #4  Ask to speak to the On-Call Cardiologist. Inform them you are a [facility] patient at the [facility].     Use [employer] allows you to communicate directly with your heart team through secure messaging.  [employer] can be accessed any time on your phone, computer, or tablet.  If you need assistance, we'd be happy to help!         Keep your Heart Appointments:    Follow up with CORE clinic in 6 months

## 2022-07-28 ENCOUNTER — ANESTHESIA (OUTPATIENT)
Dept: GASTROENTEROLOGY | Facility: CLINIC | Age: 72
End: 2022-07-28
Payer: COMMERCIAL

## 2022-07-28 ENCOUNTER — TELEPHONE (OUTPATIENT)
Dept: SURGERY | Facility: CLINIC | Age: 72
End: 2022-07-28

## 2022-07-28 ENCOUNTER — HOSPITAL ENCOUNTER (OUTPATIENT)
Facility: CLINIC | Age: 72
Discharge: HOME | End: 2022-07-28
Attending: INTERNAL MEDICINE | Admitting: INTERNAL MEDICINE
Payer: COMMERCIAL

## 2022-07-28 VITALS
RESPIRATION RATE: 17 BRPM | BODY MASS INDEX: 16.9 KG/M2 | TEMPERATURE: 97.8 F | OXYGEN SATURATION: 100 % | HEIGHT: 61 IN | DIASTOLIC BLOOD PRESSURE: 75 MMHG | SYSTOLIC BLOOD PRESSURE: 103 MMHG | HEART RATE: 61 BPM | WEIGHT: 89.51 LBS

## 2022-07-28 LAB
COLONOSCOPY: NORMAL
STFR SERPL-MCNC: 3.4 MG/L

## 2022-07-28 PROCEDURE — 88305 TISSUE EXAM BY PATHOLOGIST: CPT | Mod: TC | Performed by: INTERNAL MEDICINE

## 2022-07-28 PROCEDURE — 88305 TISSUE EXAM BY PATHOLOGIST: CPT | Mod: 26 | Performed by: PATHOLOGY

## 2022-07-28 PROCEDURE — 258N000003 HC RX IP 258 OP 636: Performed by: NURSE ANESTHETIST, CERTIFIED REGISTERED

## 2022-07-28 PROCEDURE — 370N000017 HC ANESTHESIA TECHNICAL FEE, PER MIN: Performed by: INTERNAL MEDICINE

## 2022-07-28 PROCEDURE — 250N000009 HC RX 250: Performed by: NURSE ANESTHETIST, CERTIFIED REGISTERED

## 2022-07-28 PROCEDURE — 250N000011 HC RX IP 250 OP 636: Performed by: NURSE ANESTHETIST, CERTIFIED REGISTERED

## 2022-07-28 PROCEDURE — 45380 COLONOSCOPY AND BIOPSY: CPT | Mod: PT | Performed by: INTERNAL MEDICINE

## 2022-07-28 RX ORDER — PROPOFOL 10 MG/ML
INJECTION, EMULSION INTRAVENOUS CONTINUOUS PRN
Status: DISCONTINUED | OUTPATIENT
Start: 2022-07-28 | End: 2022-07-28

## 2022-07-28 RX ORDER — PROPOFOL 10 MG/ML
INJECTION, EMULSION INTRAVENOUS PRN
Status: DISCONTINUED | OUTPATIENT
Start: 2022-07-28 | End: 2022-07-28

## 2022-07-28 RX ORDER — NALOXONE HYDROCHLORIDE 0.4 MG/ML
0.4 INJECTION, SOLUTION INTRAMUSCULAR; INTRAVENOUS; SUBCUTANEOUS
Status: DISCONTINUED | OUTPATIENT
Start: 2022-07-28 | End: 2022-07-28 | Stop reason: HOSPADM

## 2022-07-28 RX ORDER — ONDANSETRON 4 MG/1
4 TABLET, ORALLY DISINTEGRATING ORAL EVERY 30 MIN PRN
Status: DISCONTINUED | OUTPATIENT
Start: 2022-07-28 | End: 2022-07-28 | Stop reason: HOSPADM

## 2022-07-28 RX ORDER — FENTANYL CITRATE 50 UG/ML
25 INJECTION, SOLUTION INTRAMUSCULAR; INTRAVENOUS EVERY 5 MIN PRN
Status: DISCONTINUED | OUTPATIENT
Start: 2022-07-28 | End: 2022-07-28 | Stop reason: HOSPADM

## 2022-07-28 RX ORDER — HYDROMORPHONE HYDROCHLORIDE 1 MG/ML
0.2 INJECTION, SOLUTION INTRAMUSCULAR; INTRAVENOUS; SUBCUTANEOUS EVERY 5 MIN PRN
Status: DISCONTINUED | OUTPATIENT
Start: 2022-07-28 | End: 2022-07-28 | Stop reason: HOSPADM

## 2022-07-28 RX ORDER — LIDOCAINE 40 MG/G
CREAM TOPICAL
Status: DISCONTINUED | OUTPATIENT
Start: 2022-07-28 | End: 2022-07-28 | Stop reason: HOSPADM

## 2022-07-28 RX ORDER — NALOXONE HYDROCHLORIDE 0.4 MG/ML
0.2 INJECTION, SOLUTION INTRAMUSCULAR; INTRAVENOUS; SUBCUTANEOUS
Status: DISCONTINUED | OUTPATIENT
Start: 2022-07-28 | End: 2022-07-28 | Stop reason: HOSPADM

## 2022-07-28 RX ORDER — SODIUM CHLORIDE, SODIUM LACTATE, POTASSIUM CHLORIDE, CALCIUM CHLORIDE 600; 310; 30; 20 MG/100ML; MG/100ML; MG/100ML; MG/100ML
INJECTION, SOLUTION INTRAVENOUS CONTINUOUS
Status: DISCONTINUED | OUTPATIENT
Start: 2022-07-28 | End: 2022-07-28 | Stop reason: HOSPADM

## 2022-07-28 RX ORDER — ONDANSETRON 2 MG/ML
4 INJECTION INTRAMUSCULAR; INTRAVENOUS EVERY 6 HOURS PRN
Status: DISCONTINUED | OUTPATIENT
Start: 2022-07-28 | End: 2022-07-28 | Stop reason: HOSPADM

## 2022-07-28 RX ORDER — FENTANYL CITRATE 50 UG/ML
25 INJECTION, SOLUTION INTRAMUSCULAR; INTRAVENOUS
Status: DISCONTINUED | OUTPATIENT
Start: 2022-07-28 | End: 2022-07-28 | Stop reason: HOSPADM

## 2022-07-28 RX ORDER — LIDOCAINE HYDROCHLORIDE 10 MG/ML
INJECTION, SOLUTION INFILTRATION; PERINEURAL PRN
Status: DISCONTINUED | OUTPATIENT
Start: 2022-07-28 | End: 2022-07-28

## 2022-07-28 RX ORDER — OXYCODONE HYDROCHLORIDE 5 MG/1
5 TABLET ORAL EVERY 4 HOURS PRN
Status: DISCONTINUED | OUTPATIENT
Start: 2022-07-28 | End: 2022-07-28 | Stop reason: HOSPADM

## 2022-07-28 RX ORDER — FLUMAZENIL 0.1 MG/ML
0.2 INJECTION, SOLUTION INTRAVENOUS
Status: DISCONTINUED | OUTPATIENT
Start: 2022-07-28 | End: 2022-07-28 | Stop reason: HOSPADM

## 2022-07-28 RX ORDER — ONDANSETRON 4 MG/1
4 TABLET, ORALLY DISINTEGRATING ORAL EVERY 6 HOURS PRN
Status: DISCONTINUED | OUTPATIENT
Start: 2022-07-28 | End: 2022-07-28 | Stop reason: HOSPADM

## 2022-07-28 RX ORDER — ONDANSETRON 2 MG/ML
4 INJECTION INTRAMUSCULAR; INTRAVENOUS
Status: DISCONTINUED | OUTPATIENT
Start: 2022-07-28 | End: 2022-07-28 | Stop reason: HOSPADM

## 2022-07-28 RX ORDER — MEPERIDINE HYDROCHLORIDE 25 MG/ML
12.5 INJECTION INTRAMUSCULAR; INTRAVENOUS; SUBCUTANEOUS
Status: DISCONTINUED | OUTPATIENT
Start: 2022-07-28 | End: 2022-07-28 | Stop reason: HOSPADM

## 2022-07-28 RX ORDER — SODIUM CHLORIDE, SODIUM LACTATE, POTASSIUM CHLORIDE, CALCIUM CHLORIDE 600; 310; 30; 20 MG/100ML; MG/100ML; MG/100ML; MG/100ML
INJECTION, SOLUTION INTRAVENOUS CONTINUOUS PRN
Status: DISCONTINUED | OUTPATIENT
Start: 2022-07-28 | End: 2022-07-28

## 2022-07-28 RX ORDER — PROCHLORPERAZINE MALEATE 5 MG
5 TABLET ORAL EVERY 6 HOURS PRN
Status: DISCONTINUED | OUTPATIENT
Start: 2022-07-28 | End: 2022-07-28 | Stop reason: HOSPADM

## 2022-07-28 RX ORDER — ONDANSETRON 2 MG/ML
4 INJECTION INTRAMUSCULAR; INTRAVENOUS EVERY 30 MIN PRN
Status: DISCONTINUED | OUTPATIENT
Start: 2022-07-28 | End: 2022-07-28 | Stop reason: HOSPADM

## 2022-07-28 RX ADMIN — PROPOFOL 20 MG: 10 INJECTION, EMULSION INTRAVENOUS at 09:25

## 2022-07-28 RX ADMIN — LIDOCAINE HYDROCHLORIDE 50 MG: 10 INJECTION, SOLUTION INFILTRATION; PERINEURAL at 09:05

## 2022-07-28 RX ADMIN — PROPOFOL 100 MCG/KG/MIN: 10 INJECTION, EMULSION INTRAVENOUS at 09:05

## 2022-07-28 RX ADMIN — SODIUM CHLORIDE, POTASSIUM CHLORIDE, SODIUM LACTATE AND CALCIUM CHLORIDE: 600; 310; 30; 20 INJECTION, SOLUTION INTRAVENOUS at 08:58

## 2022-07-28 RX ADMIN — PROPOFOL 40 MG: 10 INJECTION, EMULSION INTRAVENOUS at 09:08

## 2022-07-28 RX ADMIN — PROPOFOL 20 MG: 10 INJECTION, EMULSION INTRAVENOUS at 09:19

## 2022-07-28 RX ADMIN — PROPOFOL 20 MG: 10 INJECTION, EMULSION INTRAVENOUS at 09:32

## 2022-07-28 ASSESSMENT — COPD QUESTIONNAIRES: COPD: 1

## 2022-07-28 ASSESSMENT — LIFESTYLE VARIABLES: TOBACCO_USE: 1

## 2022-07-28 NOTE — OR NURSING
Pt underwent colonoscopy with biopsies under MAC. Specimens sent to lab. Pt transferred to recovery and report given to 3C RN.       Jackie Duran RN

## 2022-07-28 NOTE — ANESTHESIA PREPROCEDURE EVALUATION
Anesthesia Pre-Procedure Evaluation    Patient: Maribel Yang   MRN: [id number] : 1950        Procedure : Procedure(s):  COLONOSCOPY          Past Medical History:   Diagnosis Date     Abnormal coagulation profile     p [id number] heterozygote      Age-related osteoporosis without current pathological fracture 2019     Anemia      Antiplatelet or antithrombotic long-term use      ASCUS with positive high risk HPV ,     + HPV 56, 54,& 6, colp - TAL III, Leep =TAL II     Basal cell carcinoma      Congestive heart failure, unspecified HF chronicity, unspecified heart failure type (H) 2021     COPD (chronic obstructive pulmonary disease) (H)      Depressive disorder 2015     Hypertension      Immunosuppressed status (H)     due meds     Kidney replaced by transplant 2004    Living donor recipient,  Rejection 2005     LSIL (low grade squamous intraepithelial lesion) on Pap smear 2013    +HPV 33 or 45, 61       PAD (peripheral artery disease) (H)      PONV (postoperative nausea and vomiting)      Squamous cell lung cancer (H)      Thrombosis of leg 1967     Unspecified disorder of kidney and ureter     X-linked dominant Alport's syndrome.      Past Surgical History:   Procedure Laterality Date     BIOPSY      Kidney, Lung, Breast     BIOPSY ANAL N/A 2018    Procedure: BIOPSY ANAL;;  Surgeon: Shabbir Leo MD;  [facility]     BYPASS GRAFT FEMORAL FEMORAL Bilateral 2021    Procedure: Axplantation infected graft, femoral to femoral bypass with cadaveric artery, bilateral SATORIUS MUSCLE FLAP CREATION, evacuation of absece, vacuum closure;  Surgeon: Raven Lewis MD;  [facility]     COLONOSCOPY       COLONOSCOPY N/A 2017    Procedure: COMBINED COLONOSCOPY, SINGLE OR MULTIPLE BIOPSY/POLYPECTOMY BY BIOPSY;;  Surgeon: Sushil Hyatt MD;  [facility]     COLPOSCOPY,LOOP ELECTRD CERVIX EXCIS  2008    TAL II     CONIZATION LEEP  2013     Procedure: CONIZATION LEEP;;  Surgeon: Liliana Renteria MD;  [facility]     CONIZATION LEEP N/A 08/17/2016    Procedure: CONIZATION LEEP;  Surgeon: Liliana Renteria MD;  [facility]     CV CORONARY ANGIOGRAM N/A 04/06/2021    Procedure: CV CORONARY ANGIOGRAM;  Surgeon: Sincere Rosas MD;  [facility]     CV CORONARY ANGIOGRAM N/A 04/25/2021    Procedure: Coronary Angiogram;  Surgeon: Sincere Rosas MD;  [facility]     CV PCI STENT DRUG ELUTING N/A 04/06/2021    Procedure: Percutaneous Coronary Intervention Stent Drug Eluting;  Surgeon: Sincere Rosas MD;  Location:  [facility]     ENDOVASCULAR REPAIR ANEURYSM AORTOILIAC Bilateral 04/06/2021    Procedure: Endovascular Abdominal Aortic Aneurysm Repair with Aortouniiliac Device and Femoral-Femoral Artery Bypass with [id number] Artegraft;  Surgeon: Abena Ferrara MD;  [facility]     EXAM UNDER ANESTHESIA ANUS  07/15/2014    Procedure: EXAM UNDER ANESTHESIA ANUS;  Surgeon: Radha Musa MD;  [facility]     EXAM UNDER ANESTHESIA ANUS N/A 03/14/2018    Procedure: EXAM UNDER ANESTHESIA ANUS;  Anal Exam Under Anesthesia With Excision of anal lesion, proctoscopy;  Surgeon: Shabbir Leo MD;  [facility]     EYE SURGERY       GENITOURINARY SURGERY       IR OR ANGIOGRAM  04/06/2021     IRRIGATION AND DEBRIDEMENT GROIN Right 04/24/2021    Procedure: IRRIGATION AND DEBRIDEMENT, INGUINAL REGION, I & D PF RIGHT GROIN;  Surgeon: Raven Lewis MD;  [facility]     IRRIGATION AND DEBRIDEMENT GROIN N/A 04/26/2021    Procedure: IRRIGATION AND DEBRIDEMENT, INGUINAL REGION, PLACEMENT OF VERAFLO WOUND VAC, WOUND WASHOUT,;  Surgeon: Raven Lewis MD;  [facility]     LASER CO2 EXCISE VULVA WIDE LOCAL  07/15/2014    Procedure: LASER CO2 EXCISE VULVA WIDE LOCAL;  Surgeon: Liliana Renteria MD;  [facility]     LASER CO2 VAGINA   2013    Procedure: LASER CO2 VAGINA;;  Surgeon: Liliana Renteria MD;  [facility]     LASER CO2 VAGINA N/A 2018    Procedure: LASER CO2 VAGINA;  Exam Under Anesthesia, CO2 Laser Ablation of Upper Vagina and Cervix;  Surgeon: Pati Garcia MD;  [facility]     MICROSCOPY ANAL  2013    Procedure: MICROSCOPY ANAL;  Anal Microscopy,  EUA vagina,Colposcopy Of Vagina And Vulva, Vaginal Biopsies, Omniguide Co2 Laser To Vagina and vulva, Loop Electrosurgical Excision Procedure To Cervix;  Surgeon: Radha Musa MD;  [facility]     MICROSCOPY ANAL  07/15/2014    Procedure: MICROSCOPY ANAL;  Surgeon: Radha Musa MD;  [facility]     PICC DOUBLE LUMEN PLACEMENT Right 2021    39cm, Basilic vein     TRANSESOPHAGEAL ECHOCARDIOGRAM INTRAOPERATIVE N/A 2021    Procedure: ECHOCARDIOGRAM, TRANSESOPHAGEAL, INTRAOPERATIVE;  Surgeon: GENERIC ANESTHESIA PROVIDER;  Location[facility]     VASCULAR SURGERY      Thrombectomy     ZZC NONSPECIFIC PROCEDURE      Thrombectomy     ZC NONSPECIFIC PROCEDURE   and     Bilater eye surgery - correction for crossed eyes     ZC NONSPECIFIC PROCEDURE      oopherectomy L     [facility] NONSPECIFIC PROCEDURE      open kidney biopsy - L     [facility] TRANSPLANTATION OF KIDNEY  2004    recipient -- done at [facility]      Allergies   Allergen Reactions     Blood Transfusion Related (Informational Only) Other (See Comments)     Patient has a history of a clinically significant antibody against RBC antigens.  A delay in compatible RBCs may occur.     Ultracet Nausea and Vomiting and Hives     Hydrocodone Nausea and Vomiting and Hives      Social History     Tobacco Use     Smoking status: Light Tobacco Smoker     Packs/day: 0.30     Years: 35.00     Pack years: 10.50     Types: Cigarettes     Start date: 1967     Last attempt to quit: 3/1/2021     Years since quittin.4     Smokeless tobacco: Never Used     Tobacco comment:  Couple times a week. 1-2 cigs 1-2x a week.   Substance Use Topics     Alcohol use: Not Currently     Comment: rarely      Wt Readings from Last 1 Encounters:   07/28/22 40.6 kg (89 lb 8.1 oz)        Anesthesia Evaluation   Pt has had prior anesthetic.         ROS/MED HX  ENT/Pulmonary:     (+) tobacco use, COPD,     Neurologic:       Cardiovascular:     (+) hypertension--CAD --stent-Taking blood thinners CHF     METS/Exercise Tolerance:     Hematologic:     (+) History of blood clots,     Musculoskeletal:       GI/Hepatic:       Renal/Genitourinary:     (+) renal disease, Pt has history of transplant, date: 2004,     Endo:       Psychiatric/Substance Use:       Infectious Disease:       Malignancy:       Other:            Physical Exam    Airway        Mallampati: II    Neck ROM: limited     Respiratory Devices and Support         Dental  no notable dental history         Cardiovascular   cardiovascular exam normal          Pulmonary           (+) decreased breath sounds           OUTSIDE LABS:  CBC:   Lab Results   Component Value Date    WBC 9.2 05/03/2022    WBC 9.7 03/01/2022    HGB 13.9 05/03/2022    HGB 12.4 03/01/2022    HCT 43.4 05/03/2022    HCT 40.0 03/01/2022     05/03/2022     03/01/2022     BMP:   Lab Results   Component Value Date     07/27/2022     06/22/2022    POTASSIUM 3.9 07/27/2022    POTASSIUM 4.0 06/22/2022    CHLORIDE 103 07/27/2022    CHLORIDE 103 06/22/2022    CO2 26 07/27/2022    CO2 28 06/22/2022    BUN 41 (H) 07/27/2022    BUN 26 06/22/2022    CR 1.59 (H) 07/27/2022    CR 1.53 (H) 06/22/2022     (H) 07/27/2022     (H) 06/22/2022     COAGS:   Lab Results   Component Value Date    PTT 26 04/25/2021    INR 1.18 (H) 04/25/2021     POC:   Lab Results   Component Value Date     (H) 04/26/2021    HCG Negative 09/22/2008     HEPATIC:   Lab Results   Component Value Date    ALBUMIN 3.2 (L) 03/01/2022    PROTTOTAL 7.6 03/01/2022    ALT 40 03/01/2022     AST 20 03/01/2022    GGT 76 (H) 06/23/2021    ALKPHOS 150 03/01/2022    BILITOTAL 0.5 03/01/2022    BILIDIRECT .0@ 03/10/2005     OTHER:   Lab Results   Component Value Date    PH 7.30 (L) 04/25/2021    LACT 1.3 06/13/2021    A1C 5.6 04/08/2021    KAYKAY 9.8 07/27/2022    PHOS 2.8 04/28/2021    MAG 2.0 06/24/2021    LIPASE 113 06/22/2021    TSH 1.97 04/08/2021    CRP 14.0 (H) 06/24/2021    SED 20 06/07/2021       Anesthesia Plan    ASA Status:  3   NPO Status:  NPO Appropriate    Anesthesia Type: MAC.     - Reason for MAC: straight local not clinically adequate   Induction: Intravenous, Propofol.   Maintenance: TIVA.        Consents    Anesthesia Plan(s) and associated risks, benefits, and realistic alternatives discussed. Questions answered and patient/representative(s) expressed understanding.    - Discussed:     - Discussed with:  Patient         Postoperative Care    Pain management: IV analgesics.   PONV prophylaxis: Ondansetron (or other 5HT-3), Dexamethasone or Solumedrol     Comments:                Jeffry Starkey MD
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
## 2022-07-28 NOTE — ANESTHESIA POSTPROCEDURE EVALUATION
Patient: Maribel Yang    Procedure: Procedure(s):  COLONOSCOPY, WITH BIOPSY       Anesthesia Type:  MAC    Note:  Disposition: Outpatient   Postop Pain Control: Uneventful            Sign Out: Well controlled pain   PONV: No   Neuro/Psych: Uneventful            Sign Out: Acceptable/Baseline neuro status   Airway/Respiratory: Uneventful            Sign Out: Acceptable/Baseline resp. status   CV/Hemodynamics: Uneventful            Sign Out: Acceptable CV status; No obvious hypovolemia; No obvious fluid overload   Other NRE: NONE   DID A NON-ROUTINE EVENT OCCUR?            Last vitals:  Vitals Value Taken Time   /75 07/28/22 0950   Temp 36.6  C (97.8  F) 07/28/22 0950   Pulse 61 07/28/22 0950   Resp 17 07/28/22 0950   SpO2 99 % 07/28/22 1001   Vitals shown include unvalidated device data.    Electronically Signed By: Jeffry Starkey MD  July 28, 2022  11:06 AM

## 2022-07-28 NOTE — LETTER
August 2, 2022      Maribel Yang  [address]  [facility] [address]        Dear ,    The pathology results returned from the biopsies taken at your recent colonoscopy.    The colon (large bowel) biopsies showed inflammation and mild scarring in certain areas of the colon.    I will pass these results on to your transplant team who will decide the next steps in your care.    You will need a colonoscopy for colon cancer screening in 5 years.    Sincerely,               Moise Stinson MD   [facility], [place], ENDOSCOPY  [address] [facility] [address]  Phone: [phone]

## 2022-07-28 NOTE — ANESTHESIA CARE TRANSFER NOTE
Patient: Maribel FITZPATRICK Yang    Procedure: Procedure(s):  COLONOSCOPY, WITH BIOPSY       Diagnosis: Special screening for malignant neoplasms, colon [Z12.11]  Diagnosis Additional Information: No value filed.    Anesthesia Type:   MAC     Note:    Oropharynx: spontaneously breathing  Level of Consciousness: awake  Oxygen Supplementation: room air    Independent Airway: airway patency satisfactory and stable  Dentition: dentition unchanged  Vital Signs Stable: post-procedure vital signs reviewed and stable  Report to RN Given: handoff report given  Patient transferred to: Phase II    Handoff Report: Identifed the Patient, Identified the Reponsible Provider, Reviewed the pertinent medical history, Discussed the surgical course, Reviewed Intra-OP anesthesia mangement and issues during anesthesia, Set expectations for post-procedure period and Allowed opportunity for questions and acknowledgement of understanding      Vitals:  Vitals Value Taken Time   /75 07/28/22 0950   Temp 97.6    Pulse 61 07/28/22 0950   Resp 18    SpO2 100 % 07/28/22 0951   Vitals shown include unvalidated device data.    Electronically Signed By: SANDRA Hayden CRNA  July 28, 2022  9:51 AM

## 2022-07-31 LAB
PATH REPORT.COMMENTS IMP SPEC: NORMAL
PATH REPORT.FINAL DX SPEC: NORMAL
PATH REPORT.GROSS SPEC: NORMAL
PATH REPORT.MICROSCOPIC SPEC OTHER STN: NORMAL
PATH REPORT.RELEVANT HX SPEC: NORMAL
PHOTO IMAGE: NORMAL

## 2022-08-03 ENCOUNTER — OFFICE VISIT (OUTPATIENT)
Dept: DERMATOLOGY | Facility: CLINIC | Age: 72
End: 2022-08-03
Payer: COMMERCIAL

## 2022-08-03 DIAGNOSIS — Z12.83 SKIN CANCER SCREENING: ICD-10-CM

## 2022-08-03 DIAGNOSIS — Z85.828 HISTORY OF NONMELANOMA SKIN CANCER: Primary | ICD-10-CM

## 2022-08-03 DIAGNOSIS — D22.9 MULTIPLE BENIGN NEVI: ICD-10-CM

## 2022-08-03 DIAGNOSIS — D22.9 MULTIPLE PIGMENTED NEVI: ICD-10-CM

## 2022-08-03 DIAGNOSIS — L57.0 ACTINIC KERATOSIS: ICD-10-CM

## 2022-08-03 DIAGNOSIS — D18.01 CHERRY ANGIOMA: ICD-10-CM

## 2022-08-03 DIAGNOSIS — L82.1 SEBORRHEIC KERATOSES: ICD-10-CM

## 2022-08-03 DIAGNOSIS — L82.1 SEBORRHEIC KERATOSIS: ICD-10-CM

## 2022-08-03 DIAGNOSIS — Z94.0 HISTORY OF RENAL TRANSPLANT: ICD-10-CM

## 2022-08-03 PROCEDURE — 99213 OFFICE O/P EST LOW 20 MIN: CPT | Mod: 25 | Performed by: PHYSICIAN ASSISTANT

## 2022-08-03 PROCEDURE — 17000 DESTRUCT PREMALG LESION: CPT | Performed by: PHYSICIAN ASSISTANT

## 2022-08-03 ASSESSMENT — PAIN SCALES - GENERAL: PAINLEVEL: NO PAIN (0)

## 2022-08-03 NOTE — NURSING NOTE
Dermatology Rooming Note    Maribel Yang's goals for this visit include:   Chief Complaint   Patient presents with     Skin Check     Maribel is here for a full body skin check.  Has spots of concern on the right side of her face, scalp and left shoulder.      Vianney Myles, CMA

## 2022-08-03 NOTE — LETTER
8/3/2022       RE: Maribel Yang  [address]  [facility] [address]     Dear Colleague,    Thank you for referring your patient, Maribel Yang, to the [facility]MAT[facility] [place] [facility]. Please see a copy of my visit note below.    [facility] Dermatology Note  Encounter Date: Aug 3, 2022  Office Visit     Dermatology Problem List:  1. Hx ESKD s/p rental transplant 2004  2. Hx NMSC  - BCC right lower leg.   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 Actinic keratosis, s/p cryo right dorsal hand 8/3/22  Skin cancer screening, 8/3/2022  ____________________________________________    Assessment & Plan:    #AK, right dorsal hand   - Cryo today, see procedure note below    # Cherry angiomas  # Seborrheic keratoses  - Reassured patient of benign nature, no treatment necessary.    # Multiple benign nevi  - No concerning lesions today  - Monitor for ABCDEs of melanoma   - Continue sun protection - recommend SPF 30 or higher with frequent application   - Return sooner if noticing changing or symptomatic lesions    #Hx NMSC, no signs of recurrence.   #Hx of ESKD s/p renal transplant in 2004  - Continue regular skin exams.    Procedures Performed:   - Cryotherapy procedure note, location(s): see above. After verbal consent and discussion of risks and benefits including, but not limited to, dyspigmentation/scar, blister, and pain, 1 lesion(s) was(were) treated with 1-2 mm freeze border for 1-2 cycles with liquid nitrogen. Post cryotherapy instructions were provided.    Follow-up: 1 year(s) in-person, or earlier for new or changing lesions    Staff and Scribe:     Scribe Disclosure:  GASTON VARGHESE, am serving as a scribe to document services personally performed by oRsalie Pelletier PA-C based on data  collection and the provider's statements to me.    Provider Disclosure:   The documentation recorded by the scribe accurately reflects the services I personally performed and the decisions made by me.    All risks, benefits and alternatives were discussed with patient.  Patient is in agreement and understands the assessment and plan.  All questions were answered.  Sun Screen Education was given.   Return to Clinic 1 year or sooner as needed.   Rosalie Pelletier PA-C   [facility]   ____________________________________________    CC: Skin Check (Maribel is here for a full body skin check.  Has spots of concern on the right side of her face, scalp and left shoulder. )    HPI:  Ms. Maribel Yang is a(n) 71 year old female who presents today as a return patient for FBSE. Last seen by Dr. Weber on 11/4/20, at which time patient was healing well s/p MMS surgery for treatment of SCC on right upper forehead.     Today, patient reports spots of concern on the right side of her face, scalp, and left shoulder that she would like examined. Patient denies any soreness.     Additionally, patient is now on blood thinners after having a heart attack.    Patient is otherwise feeling well, without additional skin concerns.    Labs Reviewed:  N/A    Physical Exam:  Vitals: There were no vitals taken for this visit.  SKIN: Full skin, which includes the head/face, both arms, chest, back, abdomen,both legs, genitalia and/or groin buttocks, digits and/or nails, was examined.  - There is an erythematous macule with overyling adherent scale on the left dorsal hand.   - There are dome shaped bright red papules on the trunk and extremities.   - Multiple regular brown pigmented macules and papules are identified on the trunk and extremities.   - There are waxy stuck on tan to brown papules on the trunk and extremites.    -Linear scar on the right upper forehead.   - No other lesions of concern on areas examined.      Medications:  Current Outpatient Medications   Medication     acetaminophen (TYLENOL) 325 MG tablet     aspirin (ASA) 81 MG chewable tablet     atorvastatin (LIPITOR) 80 MG tablet     bisacodyl (DULCOLAX) 5 MG EC tablet     calcium carbonate 600 mg-vitamin D 400 units (CALTRATE) 600-400 MG-UNIT per tablet     clopidogrel (PLAVIX) 75 MG tablet     fluticasone (FLONASE) 50 MCG/ACT nasal spray     hydrALAZINE (APRESOLINE) 10 MG tablet     isosorbide mononitrate (IMDUR) 60 MG 24 hr tablet     Lactobacillus-Inulin ([facility] DIGESTIVE HEALTH) CAPS     lisinopril (ZESTRIL) 2.5 MG tablet     metoprolol tartrate (LOPRESSOR) 100 MG tablet     mycophenolic acid (GENERIC EQUIVALENT) 360 MG EC tablet     Nutritional Supplements (ENSURE CLEAR) LIQD     pantoprazole (PROTONIX) 40 MG EC tablet     polyethylene glycol (GOLYTELY) 236 g suspension     predniSONE (DELTASONE) 5 MG tablet     psyllium (METAMUCIL/KONSYL) 58.6 % powder     torsemide (DEMADEX) 10 MG tablet     No current facility-administered medications for this visit.      Past Medical History:   Patient Active Problem List   Diagnosis     Other specified congenital anomalies     Kidney replaced by transplant     S/P LEEP of cervix     CARDIOVASCULAR SCREENING; LDL GOAL LESS THAN 100     Immunosuppression (H)     HTN, kidney transplant related     History of basal cell carcinoma     YUNIOR III (vulvar intraepithelial neoplasia III)     Peripheral artery disease (H)     Squamous cell lung cancer (H)     Lumbago     Vaginal dysplasia     Alopecia     Cherry angioma     Skin cancer screening     Intertrigo     History of basal cell carcinoma     Malignant neoplasm of anal canal (H)     Aftercare following organ transplant     Age-related osteoporosis without current pathological fracture     Acute deep vein thrombosis (DVT) of proximal vein of lower extremity, unspecified laterality (H)     Chronic kidney disease, stage 3 (H)     Abdominal aortic aneurysm (AAA) without  rupture (H)     Hematoma     Physical deconditioning     Congestive heart failure, unspecified HF chronicity, unspecified heart failure type (H)     Coronary artery disease involving native coronary artery with angina pectoris with documented spasm (H)     Mild protein-calorie malnutrition (H)     Anemia, iron deficiency     Past Medical History:   Diagnosis Date     Abnormal coagulation profile     p [id number]>A heterozygote      Age-related osteoporosis without current pathological fracture 06/22/2019     Anemia      Antiplatelet or antithrombotic long-term use      ASCUS with positive high risk HPV 2007, 2015    + HPV 56, 54,& 6, colp - TAL III, Leep =TAL II     Basal cell carcinoma      Congestive heart failure, unspecified HF chronicity, unspecified heart failure type (H) 06/22/2021     COPD (chronic obstructive pulmonary disease) (H)      Depressive disorder 07/2015     Hypertension      Immunosuppressed status (H)     due meds     Kidney replaced by transplant 09/2004    Living donor recipient,  Rejection 7/2005     LSIL (low grade squamous intraepithelial lesion) on Pap smear 04/2013    +HPV 33 or 45, 61       PAD (peripheral artery disease) (H)      PONV (postoperative nausea and vomiting)      Squamous cell lung cancer (H)      Thrombosis of leg 1967     Unspecified disorder of kidney and ureter     X-linked dominant Alport's syndrome.        CC Rosalie Pelletier PA-C  [address]  [place] [address] on close of this encounter.

## 2022-08-03 NOTE — PATIENT INSTRUCTIONS
Patient Education     Checking for Skin Cancer  You can find cancer early by checking your skin each month. There are 3 kinds of skin cancer. They are melanoma, basal cell carcinoma, and squamous cell carcinoma. Doing monthly skin checks is the best way to find new marks or skin changes. Follow the instructions below for checking your skin.   The ABCDEs of checking moles for melanoma   Check your moles or growths for signs of melanoma using ABCDE:   Asymmetry: the sides of the mole or growth don t match  Border: the edges are ragged, notched, or blurred  Color: the color within the mole or growth varies  Diameter: the mole or growth is larger than 6 mm (size of a pencil eraser)  Evolving: the size, shape, or color of the mole or growth is changing (evolving is not shown in the images below)    Checking for other types of skin cancer  Basal cell carcinoma or squamous cell carcinoma have symptoms such as:     A spot or mole that looks different from all other marks on your skin  Changes in how an area feels, such as itching, tenderness, or pain  Changes in the skin's surface, such as oozing, bleeding, or scaliness  A sore that does not heal  New swelling or redness beyond the border of a mole    Who s at risk?  Anyone can get skin cancer. But you are at greater risk if you have:   Fair skin, light-colored hair, or light-colored eyes  Many moles or abnormal moles on your skin  A history of sunburns from sunlight or tanning beds  A family history of skin cancer  A history of exposure to radiation or chemicals  A weakened immune system  If you have had skin cancer in the past, you are at risk for recurring skin cancer.   How to check your skin  Do your monthly skin checkups in front of a full-length mirror. Check all parts of your body, including your:   Head (ears, face, neck, and scalp)  Torso (front, back, and sides)  Arms (tops, undersides, upper, and lower armpits)  Hands (palms, backs, and fingers, including  under the nails)  Buttocks and genitals  Legs (front, back, and sides)  Feet (tops, soles, toes, including under the nails, and between toes)  If you have a lot of moles, take digital photos of them each month. Make sure to take photos both up close and from a distance. These can help you see if any moles change over time.   Most skin changes are not cancer. But if you see any changes in your skin, call your doctor right away. Only he or she can diagnose a problem. If you have skin cancer, seeing your doctor can be the first step toward getting the treatment that could save your life.   [employer] last reviewed this educational content on 4/1/2019 2000-2020 [employer]. [address] [place] [address], [place] [address]. All rights reserved. This information is not intended as a substitute for professional medical care. Always follow your healthcare professional's instructions.       When should I call my doctor?  If you are worsening or not improving, please, contact us or seek urgent care as noted below.     Who should I call with questions (adults)?  [facility] (adult and pediatric): [phone]  [facility] (adult): [phone]  For urgent needs outside of business hours call the [facility] at [phone] and ask for the dermatology resident on call to be paged  If this is a medical emergency and you are unable to reach an ER, Call [phone]    Who should I call with questions (pediatric)?  [facility]- Pediatric Dermatology  Dr. Luz Marina Burnette, Dr. Nito Croft, Dr. Almita Wall, JUAN Griffiths, Dr. Kathya Wright, Dr. Beatriz Garcia & Dr. Jeffry Torres  Non-urgent nurse triage line; [phone]- Karissa and Sultana VARELA Care Coordinatorvalery Zuñiga (/Complex ) [phone]    If you need a prescription refill, please contact your pharmacy. Refills are approved or denied by our  Physicians during normal business hours, Monday through Fridays  Per office policy, refills will not be granted if you have not been seen within the past year (or sooner depending on your child's condition)    Scheduling Information:  Pediatric Appointment Scheduling and Call Center [phone]  Radiology Scheduling- [phone]  Sedation Unit Scheduling- [phone]  [place] Scheduling- General [phone]; Pediatric Dermatology [phone]  Main  Services: [phone]  Greek: [phone]  Cape Verdean: [phone]  Hmong/Iraqi/Nepali: [phone]  Preadmission Nursing Department Fax Number: [phone] (Fax all pre-operative paperwork to this number)    For urgent matters arising during evenings, weekends, or holidays that cannot wait for normal business hours please call [phone] and ask for the dermatology resident on call to be paged.     Cryotherapy    What is it?  Use of a very cold liquid, such as liquid nitrogen, to freeze and destroy abnormal skin cells that need to be removed    What should I expect?  Tenderness and redness  A small blister that might grow and fill with dark purple blood. There may be crusting.  More than one treatment may be needed if the lesions do not go away.    How do I care for the treated area?  Gently wash the area with your hands when bathing.  Use a thin layer of Vaseline to help with healing. You may use a Band-Aid.   The area should heal within 7-10 days and may leave behind a pink or lighter color.   Do not use an antibiotic or Neosporin ointment.   You may take acetaminophen (Tylenol) for pain.     Call your doctor if you have:  Severe pain  Signs of infection (warmth, redness, cloudy yellow drainage, and or a bad smell)  Questions or concerns    Who should I call with questions?      [facility]: [phone]      [facility]: [phone]      For urgent needs outside of business hours  call the [facility] at [phone] and ask for the dermatology resident on call

## 2022-08-03 NOTE — PROGRESS NOTES
[facility] Dermatology Note  Encounter Date: Aug 3, 2022  Office Visit     Dermatology Problem List:  1. Hx ESKD s/p rental transplant 2004  2. Hx NMSC  - BCC right lower leg.   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 Actinic keratosis, s/p cryo right dorsal hand 8/3/22  Skin cancer screening, 8/3/2022  ____________________________________________    Assessment & Plan:    #AK, right dorsal hand   - Cryo today, see procedure note below    # Cherry angiomas  # Seborrheic keratoses  - Reassured patient of benign nature, no treatment necessary.    # Multiple benign nevi  - No concerning lesions today  - Monitor for ABCDEs of melanoma   - Continue sun protection - recommend SPF 30 or higher with frequent application   - Return sooner if noticing changing or symptomatic lesions    #Hx NMSC, no signs of recurrence.   #Hx of ESKD s/p renal transplant in 2004  - Continue regular skin exams.    Procedures Performed:   - Cryotherapy procedure note, location(s): see above. After verbal consent and discussion of risks and benefits including, but not limited to, dyspigmentation/scar, blister, and pain, 1 lesion(s) was(were) treated with 1-2 mm freeze border for 1-2 cycles with liquid nitrogen. Post cryotherapy instructions were provided.    Follow-up: 1 year(s) in-person, or earlier for new or changing lesions    Staff and Scribe:     Scribe Disclosure:  I, GASTON TO, am serving as a scribe to document services personally performed by Rosalie Pelletier PA-C based on data collection and the provider's statements to me.    Provider Disclosure:   The documentation recorded by the scribe accurately reflects the services I personally performed and the decisions made by me.    All risks, benefits and alternatives were discussed with patient.  Patient is in agreement and understands the assessment  and plan.  All questions were answered.  Sun Screen Education was given.   Return to Clinic 1 year or sooner as needed.   Rosalie Pelletier PA-C   [facility]   ____________________________________________    CC: Skin Check (Maribel is here for a full body skin check.  Has spots of concern on the right side of her face, scalp and left shoulder. )    HPI:  Ms. Maribel Yang is a(n) 71 year old female who presents today as a return patient for FBSE. Last seen by Dr. Weber on 11/4/20, at which time patient was healing well s/p MMS surgery for treatment of SCC on right upper forehead.     Today, patient reports spots of concern on the right side of her face, scalp, and left shoulder that she would like examined. Patient denies any soreness.     Additionally, patient is now on blood thinners after having a heart attack.    Patient is otherwise feeling well, without additional skin concerns.    Labs Reviewed:  N/A    Physical Exam:  Vitals: There were no vitals taken for this visit.  SKIN: Full skin, which includes the head/face, both arms, chest, back, abdomen,both legs, genitalia and/or groin buttocks, digits and/or nails, was examined.  - There is an erythematous macule with overyling adherent scale on the left dorsal hand.   - There are dome shaped bright red papules on the trunk and extremities.   - Multiple regular brown pigmented macules and papules are identified on the trunk and extremities.   - There are waxy stuck on tan to brown papules on the trunk and extremites.    -Linear scar on the right upper forehead.   - No other lesions of concern on areas examined.     Medications:  Current Outpatient Medications   Medication     acetaminophen (TYLENOL) 325 MG tablet     aspirin (ASA) 81 MG chewable tablet     atorvastatin (LIPITOR) 80 MG tablet     bisacodyl (DULCOLAX) 5 MG EC tablet     calcium carbonate 600 mg-vitamin D 400 units (CALTRATE) 600-400 MG-UNIT per tablet      clopidogrel (PLAVIX) 75 MG tablet     fluticasone (FLONASE) 50 MCG/ACT nasal spray     hydrALAZINE (APRESOLINE) 10 MG tablet     isosorbide mononitrate (IMDUR) 60 MG 24 hr tablet     Lactobacillus-Inulin ([facility] DIGESTIVE HEALTH) CAPS     lisinopril (ZESTRIL) 2.5 MG tablet     metoprolol tartrate (LOPRESSOR) 100 MG tablet     mycophenolic acid (GENERIC EQUIVALENT) 360 MG EC tablet     Nutritional Supplements (ENSURE CLEAR) LIQD     pantoprazole (PROTONIX) 40 MG EC tablet     polyethylene glycol (GOLYTELY) 236 g suspension     predniSONE (DELTASONE) 5 MG tablet     psyllium (METAMUCIL/KONSYL) 58.6 % powder     torsemide (DEMADEX) 10 MG tablet     No current facility-administered medications for this visit.      Past Medical History:   Patient Active Problem List   Diagnosis     Other specified congenital anomalies     Kidney replaced by transplant     S/P LEEP of cervix     CARDIOVASCULAR SCREENING; LDL GOAL LESS THAN 100     Immunosuppression (H)     HTN, kidney transplant related     History of basal cell carcinoma     YUNIOR III (vulvar intraepithelial neoplasia III)     Peripheral artery disease (H)     Squamous cell lung cancer (H)     Lumbago     Vaginal dysplasia     Alopecia     Cherry angioma     Skin cancer screening     Intertrigo     History of basal cell carcinoma     Malignant neoplasm of anal canal (H)     Aftercare following organ transplant     Age-related osteoporosis without current pathological fracture     Acute deep vein thrombosis (DVT) of proximal vein of lower extremity, unspecified laterality (H)     Chronic kidney disease, stage 3 (H)     Abdominal aortic aneurysm (AAA) without rupture (H)     Hematoma     Physical deconditioning     Congestive heart failure, unspecified HF chronicity, unspecified heart failure type (H)     Coronary artery disease involving native coronary artery with angina pectoris with documented spasm (H)     Mild protein-calorie malnutrition (H)     Anemia, iron  deficiency     Past Medical History:   Diagnosis Date     Abnormal coagulation profile     p [id number]>A heterozygote      Age-related osteoporosis without current pathological fracture 06/22/2019     Anemia      Antiplatelet or antithrombotic long-term use      ASCUS with positive high risk HPV 2007, 2015    + HPV 56, 54,& 6, colp - TAL III, Leep =TAL II     Basal cell carcinoma      Congestive heart failure, unspecified HF chronicity, unspecified heart failure type (H) 06/22/2021     COPD (chronic obstructive pulmonary disease) (H)      Depressive disorder 07/2015     Hypertension      Immunosuppressed status (H)     due meds     Kidney replaced by transplant 09/2004    Living donor recipient,  Rejection 7/2005     LSIL (low grade squamous intraepithelial lesion) on Pap smear 04/2013    +HPV 33 or 45, 61       PAD (peripheral artery disease) (H)      PONV (postoperative nausea and vomiting)      Squamous cell lung cancer (H)      Thrombosis of leg 1967     Unspecified disorder of kidney and ureter     X-linked dominant Alport's syndrome.        CC Rosalie Pelletier PA-C  [address] [address] on close of this encounter.

## 2022-08-04 ENCOUNTER — TELEPHONE (OUTPATIENT)
Dept: TRANSPLANT | Facility: CLINIC | Age: 72
End: 2022-08-04

## 2022-08-04 ENCOUNTER — LAB (OUTPATIENT)
Dept: LAB | Facility: CLINIC | Age: 72
End: 2022-08-04
Payer: COMMERCIAL

## 2022-08-04 ENCOUNTER — INFUSION THERAPY VISIT (OUTPATIENT)
Dept: INFUSION THERAPY | Facility: CLINIC | Age: 72
End: 2022-08-04
Attending: NURSE PRACTITIONER
Payer: COMMERCIAL

## 2022-08-04 VITALS
RESPIRATION RATE: 16 BRPM | OXYGEN SATURATION: 98 % | TEMPERATURE: 98 F | HEART RATE: 74 BPM | DIASTOLIC BLOOD PRESSURE: 65 MMHG | WEIGHT: 91.2 LBS | BODY MASS INDEX: 17.23 KG/M2 | SYSTOLIC BLOOD PRESSURE: 100 MMHG

## 2022-08-04 DIAGNOSIS — Z94.0 KIDNEY TRANSPLANTED: Primary | ICD-10-CM

## 2022-08-04 DIAGNOSIS — D50.9 IRON DEFICIENCY ANEMIA, UNSPECIFIED IRON DEFICIENCY ANEMIA TYPE: Primary | ICD-10-CM

## 2022-08-04 DIAGNOSIS — I50.9 CONGESTIVE HEART FAILURE, UNSPECIFIED HF CHRONICITY, UNSPECIFIED HEART FAILURE TYPE (H): ICD-10-CM

## 2022-08-04 DIAGNOSIS — N18.30 STAGE 3 CHRONIC KIDNEY DISEASE, UNSPECIFIED WHETHER STAGE 3A OR 3B CKD (H): ICD-10-CM

## 2022-08-04 DIAGNOSIS — Z79.899 IMMUNOSUPPRESSIVE MANAGEMENT ENCOUNTER FOLLOWING KIDNEY TRANSPLANT: ICD-10-CM

## 2022-08-04 DIAGNOSIS — Z94.0 IMMUNOSUPPRESSIVE MANAGEMENT ENCOUNTER FOLLOWING KIDNEY TRANSPLANT: ICD-10-CM

## 2022-08-04 DIAGNOSIS — Z48.298 AFTERCARE FOLLOWING ORGAN TRANSPLANT: ICD-10-CM

## 2022-08-04 LAB
ANION GAP SERPL CALCULATED.3IONS-SCNC: 9 MMOL/L (ref 3–14)
BUN SERPL-MCNC: 36 MG/DL (ref 7–30)
CALCIUM SERPL-MCNC: 9.8 MG/DL (ref 8.5–10.1)
CHLORIDE BLD-SCNC: 104 MMOL/L (ref 94–109)
CO2 SERPL-SCNC: 29 MMOL/L (ref 20–32)
CREAT SERPL-MCNC: 1.63 MG/DL (ref 0.52–1.04)
GFR SERPL CREATININE-BSD FRML MDRD: 34 ML/MIN/1.73M2
GLUCOSE BLD-MCNC: 93 MG/DL (ref 70–99)
POTASSIUM BLD-SCNC: 3.5 MMOL/L (ref 3.4–5.3)
SODIUM SERPL-SCNC: 142 MMOL/L (ref 133–144)

## 2022-08-04 PROCEDURE — 36415 COLL VENOUS BLD VENIPUNCTURE: CPT | Performed by: PATHOLOGY

## 2022-08-04 PROCEDURE — 80048 BASIC METABOLIC PNL TOTAL CA: CPT | Performed by: PATHOLOGY

## 2022-08-04 PROCEDURE — 258N000003 HC RX IP 258 OP 636: Performed by: NURSE PRACTITIONER

## 2022-08-04 PROCEDURE — 250N000011 HC RX IP 250 OP 636: Mod: JW | Performed by: NURSE PRACTITIONER

## 2022-08-04 PROCEDURE — 96365 THER/PROPH/DIAG IV INF INIT: CPT

## 2022-08-04 RX ORDER — EPINEPHRINE 1 MG/ML
0.3 INJECTION, SOLUTION INTRAMUSCULAR; SUBCUTANEOUS EVERY 5 MIN PRN
Status: CANCELLED | OUTPATIENT
Start: 2022-08-04

## 2022-08-04 RX ORDER — HEPARIN SODIUM,PORCINE 10 UNIT/ML
5 VIAL (ML) INTRAVENOUS
Status: CANCELLED | OUTPATIENT
Start: 2022-08-04

## 2022-08-04 RX ORDER — DIPHENHYDRAMINE HYDROCHLORIDE 50 MG/ML
50 INJECTION INTRAMUSCULAR; INTRAVENOUS
Status: CANCELLED
Start: 2022-08-04

## 2022-08-04 RX ORDER — NALOXONE HYDROCHLORIDE 0.4 MG/ML
0.2 INJECTION, SOLUTION INTRAMUSCULAR; INTRAVENOUS; SUBCUTANEOUS
Status: CANCELLED | OUTPATIENT
Start: 2022-08-04

## 2022-08-04 RX ORDER — HEPARIN SODIUM (PORCINE) LOCK FLUSH IV SOLN 100 UNIT/ML 100 UNIT/ML
5 SOLUTION INTRAVENOUS
Status: CANCELLED | OUTPATIENT
Start: 2022-08-04

## 2022-08-04 RX ORDER — METHYLPREDNISOLONE SODIUM SUCCINATE 125 MG/2ML
125 INJECTION, POWDER, LYOPHILIZED, FOR SOLUTION INTRAMUSCULAR; INTRAVENOUS
Status: CANCELLED
Start: 2022-08-04

## 2022-08-04 RX ORDER — MEPERIDINE HYDROCHLORIDE 25 MG/ML
25 INJECTION INTRAMUSCULAR; INTRAVENOUS; SUBCUTANEOUS EVERY 30 MIN PRN
Status: CANCELLED | OUTPATIENT
Start: 2022-08-04

## 2022-08-04 RX ORDER — ALBUTEROL SULFATE 90 UG/1
1-2 AEROSOL, METERED RESPIRATORY (INHALATION)
Status: CANCELLED
Start: 2022-08-04

## 2022-08-04 RX ORDER — ALBUTEROL SULFATE 0.83 MG/ML
2.5 SOLUTION RESPIRATORY (INHALATION)
Status: CANCELLED | OUTPATIENT
Start: 2022-08-04

## 2022-08-04 RX ORDER — SIROLIMUS 1 MG/1
2 TABLET, FILM COATED ORAL DAILY
Qty: 60 TABLET | Refills: 11 | Status: SHIPPED | OUTPATIENT
Start: 2022-08-04 | End: 2023-01-23 | Stop reason: ALTCHOICE

## 2022-08-04 RX ADMIN — FERRIC CARBOXYMALTOSE INJECTION 621 MG: 50 INJECTION, SOLUTION INTRAVENOUS at 12:15

## 2022-08-04 ASSESSMENT — PAIN SCALES - GENERAL: PAINLEVEL: NO PAIN (0)

## 2022-08-04 NOTE — PROGRESS NOTES
Infusion Nursing Note:  Maribel Taylorman presents today for   Chief Complaint   Patient presents with     Infusion     ferric carboxymaltose (INJECTAFER)       Patient seen by provider today: No   present during visit today: Not Applicable.    Note:   -Injectafer infused over 15min.  -Pt observed x30min per protocol.    Intravenous Access:  Peripheral IV placed by vascular access.    Treatment Conditions:  Not Applicable.    Post Infusion Assessment:  Patient tolerated infusion without incident.  Patient observed for 30 minutes post Injectafer infusion per protocol.  Blood return noted pre and post infusion.  Site patent and intact, free from redness, edema or discomfort.  No evidence of extravasations.  Access discontinued per protocol.     Discharge Plan:   Discharge instructions reviewed with: Patient.  Patient and/or family verbalized understanding of discharge instructions and all questions answered.  AVS to patient via [employer].  Patient will follow-up with provider.   Patient discharged in stable condition accompanied by: self.  Departure Mode: Ambulatory, with walker.      Rosanna Patricia RN    /84 (BP Location: Right arm, Patient Position: Sitting, Cuff Size: Adult Small)   Pulse 74   Temp 98  F (36.7  C)   Resp 16   Wt 41.4 kg (91 lb 3.2 oz)   SpO2 98%   BMI 17.23 kg/m      Administrations This Visit     ferric carboxymaltose (INJECTAFER) 621 mg in sodium chloride 0.9 % 122 mL intermittent infusion     Admin Date  08/04/2022 Action  New Bag Dose  621 mg Rate  488 mL/hr Route  Intravenous Administered By  Rosanna Patricia RN

## 2022-08-04 NOTE — LETTER
Date:August 5, 2022      Provider requested that no letter be sent. Do not send.       [facility]

## 2022-08-04 NOTE — TELEPHONE ENCOUNTER
Hitesh See MD Ututalum, Teresa, RN  Yes, would probably put her back on mTORi with previous dosing.  Okay to overlap for a few days.     ----- Message -----   From: Lisseth Heath RN   Sent: 8/3/2022   9:28 AM CDT   To: Hitesh See MD   Subject: FW: colonoscopy last week                         Dr. See,     See Provider's message below.     Sirolimus was held for Surgery last year   Dr. Alejandre's note on 12/28/21:- Changes: Not at this time would favor continuing MPA for now rather than switcing back to sirolimus given complicated postop course, wound, etc-will revisit if recurrent malignancy is a concern.     She had been on Tac but switched to CSA d/t alopecia   CSA switched to Sirolimus d/t ongoing neoplasias.     Let me know what you recommend.     ThanksTorey     ----- Message -----   From: Moise Corcoran MD   Sent: 8/2/2022   4:39 PM CDT   To: Lisseth Heath RN   Subject: colonoscopy last week                             Angelito Montanez,     I scoped Maribel last week for colon cancer screening.     Her colon did not 100% normal endoscopically (it was edematous and boggy) and I saw that she has a history of chronic diarrhea.     I took biopsies which show some inflammation and mild scarring in certain areas of the colon.  (Biopsies taken for diarrhea in Aug 2017 were normal).     I am wondering if this (and her chronic diarrhea) is related to Myfortic and if Maribel would benefit from switching to something else?  I told her that I would pass on these results to the transplant team who would make the decisions for the next steps in her care.     Let me know if you have any questions.     Thanks,     Glen Stinson   GI/hepatology        PLAN:  Call Maribel and discuss Dr. See's recommendations to   Switch Myfortic to Sirolimus 2 mg daily overlap x 5 days.  Complete Transplant labs weekly x 4 starting a week after med change.  Prescription sent.  Lab orders placed.    Please  complete weekly x 4:    BMP    CBC    Sirolimus level (pls indicate date and time of last dose)    On 4th week add the following:    Hepatic panel    Fasting lipid panel    Urine protein / creatinine ratio    OUTCOME:  Left VM.  Sent [employer] message.

## 2022-08-04 NOTE — PATIENT INSTRUCTIONS
Dear Maribel Yang    Thank you for choosing [facility] ([facility]) for your Injectafer infusion.  The following information is a summary of our appointment as well as important reminders.      We look forward in seeing you on your next appointment here at [facility] ([facility]).  Please don t hesitate to call us at [phone] to reschedule any of your appointments or to speak with one of the [facility] registered nurses.  It was a pleasure taking care of you today.    Sincerely,    [facility]  [facility]  [address] [place] [address]  [id number]  Phone:  [phone]

## 2022-08-04 NOTE — LETTER
8/4/2022         RE: Maribel Yang  [address]  [facility] [address]        Dear Colleague,    Thank you for referring your patient, Maribel Yang, to the [facility]. Please see a copy of my visit note below.    Infusion Nursing Note:  Maribel Yang presents today for   Chief Complaint   Patient presents with     Infusion     ferric carboxymaltose (INJECTAFER)       Patient seen by provider today: No   present during visit today: Not Applicable.    Note:   -Injectafer infused over 15min.  -Pt observed x30min per protocol.    Intravenous Access:  Peripheral IV placed by vascular access.    Treatment Conditions:  Not Applicable.    Post Infusion Assessment:  Patient tolerated infusion without incident.  Patient observed for 30 minutes post Injectafer infusion per protocol.  Blood return noted pre and post infusion.  Site patent and intact, free from redness, edema or discomfort.  No evidence of extravasations.  Access discontinued per protocol.     Discharge Plan:   Discharge instructions reviewed with: Patient.  Patient and/or family verbalized understanding of discharge instructions and all questions answered.  AVS to patient via [employer].  Patient will follow-up with provider.   Patient discharged in stable condition accompanied by: self.  Departure Mode: Ambulatory, with walker.      Rosanna Patricia RN    /84 (BP Location: Right arm, Patient Position: Sitting, Cuff Size: Adult Small)   Pulse 74   Temp 98  F (36.7  C)   Resp 16   Wt 41.4 kg (91 lb 3.2 oz)   SpO2 98%   BMI 17.23 kg/m      Administrations This Visit     ferric carboxymaltose (INJECTAFER) 621 mg in sodium chloride 0.9 % 122 mL intermittent infusion     Admin Date  08/04/2022 Action  New Bag Dose  621 mg Rate  488 mL/hr Route  Intravenous Administered By  Rosanna Patricia, AVERY                                  Again, thank you for allowing me to participate in the care of  your patient.        Sincerely,        [facility]

## 2022-08-09 ENCOUNTER — TELEPHONE (OUTPATIENT)
Dept: NEPHROLOGY | Facility: CLINIC | Age: 72
End: 2022-08-09

## 2022-08-09 NOTE — TELEPHONE ENCOUNTER
Called patient and left message to see if she was interested in free drug program     I was able to get the prescriber portion filled out to have provider sign/date then send back to me so I can have already if patient is interested in free drug

## 2022-08-10 ENCOUNTER — PRE VISIT (OUTPATIENT)
Dept: SURGERY | Facility: CLINIC | Age: 72
End: 2022-08-10
Payer: COMMERCIAL

## 2022-08-11 ENCOUNTER — TELEPHONE (OUTPATIENT)
Dept: PHARMACY | Facility: CLINIC | Age: 72
End: 2022-08-11

## 2022-08-11 NOTE — TELEPHONE ENCOUNTER
Called pt to schedule annual MTM visit, no answer, message left.   Kaleb ParkerD, JUAN F, BCACP  MTM Pharmacist, [facility]

## 2022-08-16 NOTE — TELEPHONE ENCOUNTER
Free Drug Application Initiated  Medication: Rapamune  Sponsor: [employer]  Phone #: [phone]  Fax #: [phone]  Additional Information: N/A

## 2022-08-17 NOTE — TELEPHONE ENCOUNTER
Free Drug Application Approved  Effective Dates: 8/6/2022-12/30/2022  Patient notified: yes  Additional Information: N/A

## 2022-09-01 DIAGNOSIS — I50.9 CONGESTIVE HEART FAILURE, UNSPECIFIED HF CHRONICITY, UNSPECIFIED HEART FAILURE TYPE (H): ICD-10-CM

## 2022-09-06 DIAGNOSIS — I50.9 CONGESTIVE HEART FAILURE, UNSPECIFIED HF CHRONICITY, UNSPECIFIED HEART FAILURE TYPE (H): ICD-10-CM

## 2022-09-07 RX ORDER — METOPROLOL TARTRATE 100 MG
100 TABLET ORAL 2 TIMES DAILY
Qty: 180 TABLET | Refills: 2 | Status: SHIPPED | OUTPATIENT
Start: 2022-09-07 | End: 2023-01-25

## 2022-09-07 RX ORDER — METOPROLOL TARTRATE 100 MG
100 TABLET ORAL 2 TIMES DAILY
Qty: 180 TABLET | Refills: 1 | OUTPATIENT
Start: 2022-09-07

## 2022-10-03 DIAGNOSIS — M81.0 AGE-RELATED OSTEOPOROSIS WITHOUT CURRENT PATHOLOGICAL FRACTURE: Primary | ICD-10-CM

## 2022-10-04 ENCOUNTER — TELEPHONE (OUTPATIENT)
Dept: FAMILY MEDICINE | Facility: CLINIC | Age: 72
End: 2022-10-04

## 2022-10-04 NOTE — TELEPHONE ENCOUNTER
Forms/Letter Request    Type of form/letter: medical certfication form for meal services    Have you been seen for this request: N/A    Do we have the form/letter: Yes:  form    When is form/letter needed by:  By 10/31/2022    How would you like the form/letter returned: Fax [phone]    Patient Notified form requests are processed in 3-5 business days:Yes    Could we send this information to you in [employer] or would you prefer to receive a phone call?:   No preference   Okay to leave a detailed message?: Yes at Other phone number:   [phone]

## 2022-10-04 NOTE — TELEPHONE ENCOUNTER
PN,    Routing refill request to provider for review/approval because:  Drug not active on patient's medication list  Last OV 5/3/22 - annual wellness exam.    Thank you,  Monse Hopson RN

## 2022-10-05 DIAGNOSIS — T81.49XA WOUND INFECTION AFTER SURGERY: ICD-10-CM

## 2022-10-05 DIAGNOSIS — I21.11 ST ELEVATION MYOCARDIAL INFARCTION INVOLVING RIGHT CORONARY ARTERY (H): ICD-10-CM

## 2022-10-05 DIAGNOSIS — I20.1 CORONARY ARTERY VASOSPASM (H): ICD-10-CM

## 2022-10-05 DIAGNOSIS — I71.40 ABDOMINAL AORTIC ANEURYSM (AAA) WITHOUT RUPTURE (H): ICD-10-CM

## 2022-10-07 DIAGNOSIS — T81.49XA WOUND INFECTION AFTER SURGERY: ICD-10-CM

## 2022-10-07 DIAGNOSIS — I21.11 ST ELEVATION MYOCARDIAL INFARCTION INVOLVING RIGHT CORONARY ARTERY (H): ICD-10-CM

## 2022-10-07 DIAGNOSIS — I71.40 ABDOMINAL AORTIC ANEURYSM (AAA) WITHOUT RUPTURE (H): ICD-10-CM

## 2022-10-07 DIAGNOSIS — I20.1 CORONARY ARTERY VASOSPASM (H): ICD-10-CM

## 2022-10-07 RX ORDER — ISOSORBIDE MONONITRATE 60 MG/1
60 TABLET, EXTENDED RELEASE ORAL DAILY
Qty: 90 TABLET | Refills: 1 | Status: CANCELLED | OUTPATIENT
Start: 2022-10-07

## 2022-10-08 RX ORDER — ISOSORBIDE MONONITRATE 60 MG/1
60 TABLET, EXTENDED RELEASE ORAL DAILY
Qty: 90 TABLET | Refills: 0 | Status: SHIPPED | OUTPATIENT
Start: 2022-10-08 | End: 2023-01-02

## 2022-10-08 NOTE — TELEPHONE ENCOUNTER
isosorbide mononitrate (IMDUR) 60 MG 24 hr tablet      Last Written Prescription Date:  4/6/22  Last Fill Quantity: 90,   # refills: 1   Last Office Visit : Marguerite Muñoz APRN CNP  Cardiovascular Disease  7/27/2022  [facility]  Recommended Follow up in 1 month with echo.   Future Office visit:  None scheduled    Routing refill request to provider for review/approval because:  Overdue for follow up  90 day refill, routed to cardiology

## 2022-10-10 DIAGNOSIS — I50.9 CONGESTIVE HEART FAILURE, UNSPECIFIED HF CHRONICITY, UNSPECIFIED HEART FAILURE TYPE (H): Primary | ICD-10-CM

## 2022-10-12 ENCOUNTER — MYC MEDICAL ADVICE (OUTPATIENT)
Dept: FAMILY MEDICINE | Facility: CLINIC | Age: 72
End: 2022-10-12

## 2022-10-13 ENCOUNTER — TELEPHONE (OUTPATIENT)
Dept: TRANSPLANT | Facility: CLINIC | Age: 72
End: 2022-10-13

## 2022-10-13 NOTE — TELEPHONE ENCOUNTER
Galnia Young RN Ututalum, Teresa, RN  Hi!   Maribel was identified by Beatriz as a patient on the Rapamune free drug program. Can you please connect with the patient regarding supply and if needed, discuss with MD an alternative?     Thanks!     Sayda      PLAN:  Call Maribel and check if on Rapamune and how much she has left.    OUTCOME:  Left VM.  Requested call back.

## 2022-10-21 ENCOUNTER — TELEPHONE (OUTPATIENT)
Dept: NEPHROLOGY | Facility: CLINIC | Age: 72
End: 2022-10-21

## 2022-10-21 NOTE — TELEPHONE ENCOUNTER
Free Drug Application Initiated  Medication: Rapamune  Sponsor: [employer]  Phone #: [phone]  Fax #: [phone]  Additional Information: n/a

## 2022-11-01 ENCOUNTER — TELEPHONE (OUTPATIENT)
Dept: PHARMACY | Facility: CLINIC | Age: 72
End: 2022-11-01

## 2022-11-01 NOTE — TELEPHONE ENCOUNTER
This patient is due for MT follow-up. I called the patient to schedule an appointment and left a message with the clinic phone number for the patient to call to schedule.    Mabel Bernal, PharmD  Medication Therapy Management Resident  [phone]

## 2022-11-08 NOTE — TELEPHONE ENCOUNTER
Called [employer] to check on status of rapamune for re-enrollment   Per rep stated they will need patients enrollment form.  Called patient and left a message to follow up on enrollment form

## 2022-11-17 NOTE — PROGRESS NOTES
Colon and Rectal Surgery Clinic Note    RE: Maribel Yang.  : 1950.  JESSEE: 2022.    Reason for visit: Maribel is a 71 year old woman who presents today for follow up of her anal cancer.     HPI:     Maribel has a past medical history of kidney transplant in , lung cancer in , cervical dysplasia, ananorectal condyloma and vulvar intraepithelial neoplasia 2. Dr. Db Urbina excised a patch if high grade perianal dysplasia in the right anterolateral position in . 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 MRI without pelvic or inguinal lymphadenopathy.     She completed chemoradiation on 18 and presents today or follow up.    Most recent CT AP on 21:  1. Large thick-walled fluid collection in the lower abdominal wall  extending from the right groin surgical site and right common femoral  artery along the femoral-femoral bypass graft to the left common  femoral artery. Surgical consultation is recommended.  2. Postprocedural changes of abdominal aortic EVAR with left uni-iliac  graft.     CTA CAP 21:  Impression:  1. Postoperative changes of aortic to left common iliac stent graft.  There is mural thrombus present in the proximal stent graft occupying  less than 50% of the lumen as detailed the body of the report. Minimal  change in size of the aneurysmal sac without evidence for endoleak.   2. Postoperative changes of left to right femorofemoral bypass with a  pseudoaneurysm at the level of the right common femoral arterial  anastomosis measuring up to 2 cm. This was not visualized on the  comparison vascular ultrasound 2021 and there is no prior  postprocedural CT with contrast for comparison.  3. Resolved or nearly completely resolved  subcutaneous hematoma  adjacent to the bypass graft.  4. Unchanged thoracic aortic aneurysm measuring up to 5 cm in the  distal descending thoracic aorta, also containing mural thrombus which  is not significantly changed compared to 6/22/2021 chest CTA .  5. Aberrant retroesophageal right subclavian artery with a new focal  dissection in the proximal segment.  6. Severe upper lobe and centrilobular predominant emphysematous  change.  7. Unchanged spiculated nodule in the anterior right upper lobe and  scattered sub-6 mm pulmonary nodules as detailed above. Continued  attention on follow-up recommended.  8. Colonic diverticulosis without evidence for acute diverticulitis.  [Access Center: Right common femoral artery pseudoaneurysm]    Non contrast CT Chest 3/1/22:  Impression:   1. Similar appearance of right upper lobe spiculated nodule measuring  to 27 mm status post SBRT. Appears relatively stable going back to  8/29/2016.  2. Remainder of some 6 mm pulmonary nodules are unchanged.  3. Similar appearance of descending thoracic aortic aneurysm measuring  up to 56 mm, better characterized on CT chest abdomen and pelvis  8/25/2021 due to the presence of intravenous contrast.  4. 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  5. Trace pericardial effusion.    Maribel had an AAA s/p EVAR with L-R femoral bypass graft on 4/26/21 c/b sepsis 2/2 R femoral groin access site and perigraft abscess, CAD with STEMI s/p LIUDMILA to RCA (4/7/21) and another STEMI post-operatively (4/23/21) attributed to coronary vasospasm, and HFrEF (EF 35-40%) secondary to ICM.    Maribel was following with Dr. Leo before he retired and last saw him on 2/8/22 without evidence of recurrence.    Maribel last saw Dr. Jag Rene's team on 3/29/22    Colonoscopy 7/28/22 with Dr. Milad Kelley Konstantin:  Findings:        The perianal and digital rectal examinations were normal.        An area of moderately congested mucosa was found in the  transverse        colon, in the ascending colon and in the cecum. Biopsies for histology        were taken with a cold forceps from the right colon, left colon and        rectum for evaluation of MMF colitis.        Diffuse mild inflammation characterized by congestion (edema), erythema,        friability and granularity was found in the ascending colon and in the        cecum.        Multiple small and large-mouthed diverticula were found in the sigmoid        colon.        There is no endoscopic evidence of polyps in the entire colon.   Final Diagnosis   A. RIGHT COLON, RANDOM BIOPSY:   Colonic mucosa with mild patchy fibrosis and chronic inflammation, no evidence of microscopic or chronic colitis or other significant histologic abnormality   B. LEFT COLON, RANDOM BIOPSY:   Colonic mucosa with no evidence of microscopic or chronic colitis or other significant histologic abnormality    C. RECTUM, BIOPSY:  Rectal mucosa with mild patchy fibrosis, no evidence of microscopic or chronic colitis or other significant histologic abnormality ( see comment)     Maribel is currently doing well.  Unfortunately, she recently had a heart attack while she was in the hospital.  She also feels like her fecal clustering has somewhat worsened.  Denies blood per rectum.  Continues to have urgency and some fecal seepage but no incontinence to formed stool.    Medical history:  Past Medical History:   Diagnosis Date     Abnormal coagulation profile     p [id number] heterozygote      Age-related osteoporosis without current pathological fracture 06/22/2019     Anemia      Antiplatelet or antithrombotic long-term use      ASCUS with positive high risk HPV 2007, 2015    + HPV 56, 54,& 6, colp - TAL III, Leep =TAL II     Basal cell carcinoma      Congestive heart failure, unspecified HF chronicity, unspecified heart failure type (H) 06/22/2021     COPD (chronic obstructive pulmonary disease) (H)      Depressive disorder 07/2015      Hypertension      Immunosuppressed status (H)     due meds     Kidney replaced by transplant 09/2004    Living donor recipient,  Rejection 7/2005     LSIL (low grade squamous intraepithelial lesion) on Pap smear 04/2013    +HPV 33 or 45, 61       PAD (peripheral artery disease) (H)      PONV (postoperative nausea and vomiting)      Squamous cell lung cancer (H)      Thrombosis of leg 1967     Unspecified disorder of kidney and ureter     X-linked dominant Alport's syndrome.       Surgical history:  Past Surgical History:   Procedure Laterality Date     BIOPSY      Kidney, Lung, Breast     BIOPSY ANAL N/A 03/14/2018    Procedure: BIOPSY ANAL;;  Surgeon: Shabbir Leo MD;  Location: [facility]     BYPASS GRAFT FEMORAL FEMORAL Bilateral 04/25/2021    Procedure: Axplantation infected graft, femoral to femoral bypass with cadaveric artery, bilateral SATORIUS MUSCLE FLAP CREATION, evacuation of absece, vacuum closure;  Surgeon: Raven Lewis MD;  Location: [facility]     COLONOSCOPY       COLONOSCOPY N/A 08/09/2017    Procedure: COMBINED COLONOSCOPY, SINGLE OR MULTIPLE BIOPSY/POLYPECTOMY BY BIOPSY;;  Surgeon: Sushil Hyatt MD;  Location: [facility]     COLONOSCOPY N/A 7/28/2022    Procedure: COLONOSCOPY, WITH BIOPSY;  Surgeon: Moise Corcoran MD;  Location:  GI     COLPOSCOPY,LOOP ELECTRD CERVIX EXCIS  03/11/2008    TAL II     CONIZATION LEEP  07/17/2013    Procedure: CONIZATION LEEP;;  Surgeon: Liliana Renteria MD;  [facility]     CONIZATION LEEP N/A 08/17/2016    Procedure: CONIZATION LEEP;  Surgeon: Liliana Renteria MD;  [facility]     CV CORONARY ANGIOGRAM N/A 04/06/2021    Procedure: CV CORONARY ANGIOGRAM;  Surgeon: Sincere Rosas MD;  Location:  [facility]     CV CORONARY ANGIOGRAM N/A 04/25/2021    Procedure: Coronary Angiogram;  Surgeon: Sincere Rosas MD;  Location:  [facility]     CV PCI STENT DRUG ELUTING N/A 04/06/2021     Procedure: Percutaneous Coronary Intervention Stent Drug Eluting;  Surgeon: Sincere Rosas MD;  [facility]     ENDOVASCULAR REPAIR ANEURYSM AORTOILIAC Bilateral 04/06/2021    Procedure: Endovascular Abdominal Aortic Aneurysm Repair with Aortouniiliac Device and Femoral-Femoral Artery Bypass with [id number] Artegraft;  Surgeon: Abena Ferrara MD;  [facility]     EXAM UNDER ANESTHESIA ANUS  07/15/2014    Procedure: EXAM UNDER ANESTHESIA ANUS;  Surgeon: Radha Musa MD;  [facility]     EXAM UNDER ANESTHESIA ANUS N/A 03/14/2018    Procedure: EXAM UNDER ANESTHESIA ANUS;  Anal Exam Under Anesthesia With Excision of anal lesion, proctoscopy;  Surgeon: Shabbir Leo MD;  [facility]     EYE SURGERY       GENITOURINARY SURGERY       IR OR ANGIOGRAM  04/06/2021     IRRIGATION AND DEBRIDEMENT GROIN Right 04/24/2021    Procedure: IRRIGATION AND DEBRIDEMENT, INGUINAL REGION, I & D PF RIGHT GROIN;  Surgeon: Raven Lewis MD;  [facility]     IRRIGATION AND DEBRIDEMENT GROIN N/A 04/26/2021    Procedure: IRRIGATION AND DEBRIDEMENT, INGUINAL REGION, PLACEMENT OF VERAFLO WOUND VAC, WOUND WASHOUT,;  Surgeon: Raven Lewis MD;  [facility]     LASER CO2 EXCISE VULVA WIDE LOCAL  07/15/2014    Procedure: LASER CO2 EXCISE VULVA WIDE LOCAL;  Surgeon: Liliana Renteria MD;  [facility]     LASER CO2 VAGINA  07/17/2013    Procedure: LASER CO2 VAGINA;;  Surgeon: Liliana Renteria MD;  [facility]     LASER CO2 VAGINA N/A 09/25/2018    Procedure: LASER CO2 VAGINA;  Exam Under Anesthesia, CO2 Laser Ablation of Upper Vagina and Cervix;  Surgeon: Pati Garcia MD;  [facility]     MICROSCOPY ANAL  07/17/2013    Procedure: MICROSCOPY ANAL;  Anal Microscopy,  EUA vagina,Colposcopy Of Vagina And Vulva, Vaginal Biopsies, Omniguide Co2 Laser To Vagina and vulva, Loop Electrosurgical Excision Procedure To Cervix;  Surgeon: Radha Musa MD;   [facility]     MICROSCOPY ANAL  07/15/2014    Procedure: MICROSCOPY ANAL;  Surgeon: Radha Musa MD;  [facility]     PICC DOUBLE LUMEN PLACEMENT Right 2021    39cm, Basilic vein     TRANSESOPHAGEAL ECHOCARDIOGRAM INTRAOPERATIVE N/A 2021    Procedure: ECHOCARDIOGRAM, TRANSESOPHAGEAL, INTRAOPERATIVE;  Surgeon: GENERIC ANESTHESIA PROVIDER;  Location: [facility]     VASCULAR SURGERY      Thrombectomy     ZZC NONSPECIFIC PROCEDURE      Thrombectomy     ZZC NONSPECIFIC PROCEDURE   and     Bilater eye surgery - correction for crossed eyes     ZZC NONSPECIFIC PROCEDURE      oopherectomy L     ZZC NONSPECIFIC PROCEDURE      open kidney biopsy - L     [facility] TRANSPLANTATION OF KIDNEY  2004    recipient -- done at [facility]       Family history:  Family History   Problem Relation Age of Onset     Diabetes Father      Alcohol/Drug Father      Arthritis Father      Hypertension Father      Lipids Father         high cholesterol     Arthritis Mother      Diabetes Mother      Depression Mother      Heart Disease Mother      Neurologic Disorder Mother      Obesity Mother      Psychotic Disorder Mother      Thyroid Disease Mother      Hypertension Mother      Gynecology Sister         Precancerous cell removal from cervix at age 45     Depression Sister      Allergies Sister      Alcohol/Drug Sister      Neurologic Disorder Sister      Cerebrovascular Disease Paternal Grandmother      Diabetes Paternal Grandmother      Alcohol/Drug Son      Colon Polyps Sister      Breast Cancer Niece      Other Cancer Sister         Cervical     Obesity Sister      Depression Sister      Substance Abuse Son      Substance Abuse Sister              Asthma Other      Colon Cancer No family hx of      Crohn's Disease No family hx of      Ulcerative Colitis No family hx of      Melanoma No family hx of      Skin Cancer No family hx of        Medications:  Current Outpatient Medications   Medication  Sig Dispense Refill     acetaminophen (TYLENOL) 325 MG tablet Take 2 tablets (650 mg) by mouth every 4 hours as needed for other (For optimal non-opioid multimodal pain management to improve pain control.) 100 tablet 3     aspirin (ASA) 81 MG chewable tablet Take 1 tablet (81 mg) by mouth daily (Patient taking differently: Take 81 mg by mouth every morning) 90 tablet 3     atorvastatin (LIPITOR) 80 MG tablet Take 1 tablet (80 mg) by mouth daily (Patient taking differently: Take 80 mg by mouth every morning) 90 tablet 3     bisacodyl (DULCOLAX) 5 MG EC tablet Take as directed. One day before exam take 2 tablets at 3 PM. Take 2 tablets at 11 PM. 4 tablet 0     Calcium Carb-Cholecalciferol (CALCIUM CARBONATE-VITAMIN D3) 600-400 MG-UNIT TABS TAKE ONE TABLET BY MOUTH TWICE A  tablet 3     calcium carbonate 600 mg-vitamin D 400 units (CALTRATE) 600-400 MG-UNIT per tablet Take 1 tablet by mouth 2 times daily 120 tablet 3     clopidogrel (PLAVIX) 75 MG tablet Take 1 tablet (75 mg) by mouth daily 90 tablet 3     fluticasone (FLONASE) 50 MCG/ACT nasal spray Spray 1 spray into both nostrils daily (Patient taking differently: Spray 1 spray into both nostrils every morning) 18.2 mL 3     hydrALAZINE (APRESOLINE) 10 MG tablet Take 1 tablet (10 mg) by mouth 3 times daily 270 tablet 3     isosorbide mononitrate (IMDUR) 60 MG 24 hr tablet Take 1 tablet (60 mg) by mouth daily 90 tablet 0     Lactobacillus-Inulin ([facility] DIGESTIVE [facility]) CAPS TAKE ONE CAPSULE BY MOUTH ONCE DAILY (DUE FOR PHYSICAL IN MARCH) (Patient taking differently: every morning) 90 capsule 3     lisinopril (ZESTRIL) 2.5 MG tablet Take 1 tablet (2.5 mg) by mouth daily (Patient taking differently: Take 2.5 mg by mouth every morning) 90 tablet 3     metoprolol tartrate (LOPRESSOR) 100 MG tablet Take 1 tablet (100 mg) by mouth 2 times daily 180 tablet 2     Nutritional Supplements (ENSURE CLEAR) LIQD Take 1 Bottle by mouth daily 396 mL 11     pantoprazole  "(PROTONIX) 40 MG EC tablet Take 1 tablet (40 mg) by mouth every morning (before breakfast) 60 tablet 3     predniSONE (DELTASONE) 5 MG tablet Take 1 tablet (5 mg) by mouth daily (Patient taking differently: Take 5 mg by mouth every morning) 90 tablet 3     psyllium (METAMUCIL/KONSYL) 58.6 % powder Take by mouth every morning       sirolimus (GENERIC EQUIVALENT) 1 MG tablet Take 2 tablets (2 mg) by mouth daily 60 tablet 11     torsemide (DEMADEX) 10 MG tablet Take 1 tablet (10 mg) by mouth daily Additional use as directed by [facility]. 90 tablet 1     polyethylene glycol (GOLYTELY) 236 g suspension Take as directed. One day before exam fill the jug with water. Cover and shake until well mixed. At 6 PM start drinking an 8oz glass of mixture every 15 minutes until jug is 1/2 empty. Store remainder in the refrigerator.  At 11 PM Start drinking the other half of the Golytely jug. Drink one 8-ounce glass every 15 minutes until the jug is empty. 4000 mL 0       Allergies:  Allergies   Allergen Reactions     Blood Transfusion Related (Informational Only) Other (See Comments)     Patient has a history of a clinically significant antibody against RBC antigens.  A delay in compatible RBCs may occur.     Ultracet Nausea and Vomiting and Hives     Hydrocodone Nausea and Vomiting and Hives       Social history:   Social History     Tobacco Use     Smoking status: Light Smoker     Packs/day: 0.30     Years: 35.00     Pack years: 10.50     Types: Cigarettes     Start date: 1967     Last attempt to quit: 3/1/2021     Years since quittin.7     Smokeless tobacco: Never     Tobacco comments:     Couple times a week. 1-2 cigs 1-2x a week.   Substance Use Topics     Alcohol use: Not Currently     Comment: rarely     Marital status: .    Physical Examination:  BP (!) 144/87 (BP Location: Left arm, Patient Position: Sitting, Cuff Size: Adult Regular)   Pulse 70   Ht 5' 1\"   Wt 90 lb 14.4 oz   SpO2 100%   BMI 17.18 " kg/m    General: Well hydrated. No acute distress.  Abdomen: No inguinal adenopathy palpated.  Perianal external examination:  Perianal skin: Hyperpigmentation and butterfly-like distribution.  Lesions: Left anterior perianal skin scar with no evidence of neoplasia.  Eversion of buttocks: There was not evidence of an anal fissure. Details: N/A.  Skin tags or external hemorrhoids: Yes: Small and nonthrombosed.  Digital rectal examination: Was performed.   Sphincter tone: Fair.  Palpable lesions: No.  Other: A small  rectocele was appreciated on bearing down.  Bimanual examination: was not performed.    Anoscopy: Was performed.   Hemorrhoids: Yes.  Grade 2-3 with no evidence of thrombosis or bleeding.  Mild mucosal prolapse that reduces spontaneously.  Some mild ectropion into the anal canal at the anterior base of the anal canal.  Lesions: No    Investigations:  None.     Procedures:  None.    ASSESSMENT  71-year-old female with significant comorbidities status post chemoradiotherapy for anal canal squamous cell carcinoma.  No evidence of recurrence.    PLAN  1.  Repeat anoscopy with anorectal exam in 1 year.  Rest of cancer surveillance per medical oncology.    45 minutes spent on the date of encounter (excluding time performing procedures with or without biopsy) performing chart review, history and exam, documentation and further activities as noted above.      Carson Russ MD, MSc, FACS, FASCRS.    Chief, Division of Colon & Rectal Surgery  Stanley M. Goldberg, MD Endowed Chair, Colon & Rectal Surgery  Department of Surgery  [facility]      Referring Provider:  Referred Self, MD  No address on file     Primary Care Provider:  Lisa Martinez

## 2022-11-22 ENCOUNTER — MYC MEDICAL ADVICE (OUTPATIENT)
Dept: SURGERY | Facility: CLINIC | Age: 72
End: 2022-11-22

## 2022-11-28 ENCOUNTER — OFFICE VISIT (OUTPATIENT)
Dept: SURGERY | Facility: CLINIC | Age: 72
End: 2022-11-28
Payer: COMMERCIAL

## 2022-11-28 VITALS
WEIGHT: 90.9 LBS | OXYGEN SATURATION: 100 % | HEART RATE: 70 BPM | SYSTOLIC BLOOD PRESSURE: 144 MMHG | HEIGHT: 61 IN | DIASTOLIC BLOOD PRESSURE: 87 MMHG | BODY MASS INDEX: 17.16 KG/M2

## 2022-11-28 DIAGNOSIS — C21.1 MALIGNANT NEOPLASM OF ANAL CANAL (H): Primary | ICD-10-CM

## 2022-11-28 PROCEDURE — 99215 OFFICE O/P EST HI 40 MIN: CPT | Performed by: COLON & RECTAL SURGERY

## 2022-11-28 ASSESSMENT — ENCOUNTER SYMPTOMS
WHEEZING: 0
HYPOTENSION: 0
DECREASED APPETITE: 0
CONSTIPATION: 1
BLOATING: 0
NECK MASS: 0
NAUSEA: 0
POSTURAL DYSPNEA: 1
FEVER: 0
TASTE DISTURBANCE: 0
HALLUCINATIONS: 0
HOARSE VOICE: 0
SNORES LOUDLY: 0
FATIGUE: 1
EXERCISE INTOLERANCE: 1
HEARTBURN: 0
JAUNDICE: 0
POLYPHAGIA: 0
SLEEP DISTURBANCES DUE TO BREATHING: 0
PALPITATIONS: 0
INCREASED ENERGY: 0
ABDOMINAL PAIN: 0
DYSPNEA ON EXERTION: 1
WEIGHT LOSS: 0
HEMOPTYSIS: 0
SINUS CONGESTION: 0
SPUTUM PRODUCTION: 0
BLOOD IN STOOL: 0
ALTERED TEMPERATURE REGULATION: 0
VOMITING: 0
WEIGHT GAIN: 0
COUGH: 0
DIARRHEA: 0
TROUBLE SWALLOWING: 0
BOWEL INCONTINENCE: 0
COUGH DISTURBING SLEEP: 0
SHORTNESS OF BREATH: 1
ORTHOPNEA: 1
LIGHT-HEADEDNESS: 1
RECTAL PAIN: 0
NIGHT SWEATS: 0
SINUS PAIN: 1
SORE THROAT: 0
POLYDIPSIA: 0
HYPERTENSION: 0
SMELL DISTURBANCE: 0
CHILLS: 0
SYNCOPE: 0

## 2022-11-28 ASSESSMENT — PAIN SCALES - GENERAL: PAINLEVEL: NO PAIN (0)

## 2022-11-28 NOTE — NURSING NOTE
"Chief Complaint   Patient presents with     New Patient     anoscopy       Vitals:    11/28/22 1134   BP: (!) 144/87   BP Location: Left arm   Patient Position: Sitting   Cuff Size: Adult Regular   Pulse: 70   SpO2: 100%   Weight: 90 lb 14.4 oz   Height: 5' 1\"       Body mass index is 17.18 kg/m .    Maria G Lee CMA    "

## 2022-11-28 NOTE — LETTER
2022       RE: Maribel Yang  [address]  [facility] [address]     Dear Colleague,    Thank you for referring your patient, Maribel Yang, to the [facility] COLON AND RECTAL SURGERY CLINIC [place] at [facility]. Please see a copy of my visit note below.    Colon and Rectal Surgery Clinic Note    RE: Maribel Yang.  : 1950.  JESSEE: 2022.    Reason for visit: Maribel is a 71 year old woman who presents today for follow up of her anal cancer.     HPI:     Maribel has a past medical history of kidney transplant in , lung cancer in , cervical dysplasia, ananorectal condyloma and vulvar intraepithelial neoplasia 2. Dr. Db Urbina excised a patch if high grade perianal dysplasia in the right anterolateral position in . 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 MRI without pelvic or inguinal lymphadenopathy.     She completed chemoradiation on 18 and presents today or follow up.    Most recent CT AP on 21:  1. Large thick-walled fluid collection in the lower abdominal wall  extending from the right groin surgical site and right common femoral  artery along the femoral-femoral bypass graft to the left common  femoral artery. Surgical consultation is recommended.  2. Postprocedural changes of abdominal aortic EVAR with left uni-iliac  graft.     CTA CAP 21:  Impression:  1. Postoperative changes of aortic to left common iliac stent graft.  There is mural thrombus present in the proximal stent graft occupying  less than 50% of the lumen as detailed the body of the report. Minimal  change in size of the aneurysmal sac without evidence for endoleak.   2. Postoperative changes of  left to right femorofemoral bypass with a  pseudoaneurysm at the level of the right common femoral arterial  anastomosis measuring up to 2 cm. This was not visualized on the  comparison vascular ultrasound 4/24/2021 and there is no prior  postprocedural CT with contrast for comparison.  3. Resolved or nearly completely resolved subcutaneous hematoma  adjacent to the bypass graft.  4. Unchanged thoracic aortic aneurysm measuring up to 5 cm in the  distal descending thoracic aorta, also containing mural thrombus which  is not significantly changed compared to 6/22/2021 chest CTA .  5. Aberrant retroesophageal right subclavian artery with a new focal  dissection in the proximal segment.  6. Severe upper lobe and centrilobular predominant emphysematous  change.  7. Unchanged spiculated nodule in the anterior right upper lobe and  scattered sub-6 mm pulmonary nodules as detailed above. Continued  attention on follow-up recommended.  8. Colonic diverticulosis without evidence for acute diverticulitis.  [Access Center: Right common femoral artery pseudoaneurysm]    Non contrast CT Chest 3/1/22:  Impression:   1. Similar appearance of right upper lobe spiculated nodule measuring  to 27 mm status post SBRT. Appears relatively stable going back to  8/29/2016.  2. Remainder of some 6 mm pulmonary nodules are unchanged.  3. Similar appearance of descending thoracic aortic aneurysm measuring  up to 56 mm, better characterized on CT chest abdomen and pelvis  8/25/2021 due to the presence of intravenous contrast.  4. 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  5. Trace pericardial effusion.    Maribel had an AAA s/p EVAR with L-R femoral bypass graft on 4/26/21 c/b sepsis 2/2 R femoral groin access site and perigraft abscess, CAD with STEMI s/p LIUDMILA to RCA (4/7/21) and another STEMI post-operatively (4/23/21) attributed to coronary vasospasm, and HFrEF (EF 35-40%) secondary to ICM.    Maribel was following with  Dr. Leo before he retired and last saw him on 2/8/22 without evidence of recurrence.    Maribel last saw Dr. Jag Rene's team on 3/29/22    Colonoscopy 7/28/22 with Dr. Milad Kelley Konstantin:  Findings:        The perianal and digital rectal examinations were normal.        An area of moderately congested mucosa was found in the transverse        colon, in the ascending colon and in the cecum. Biopsies for histology        were taken with a cold forceps from the right colon, left colon and        rectum for evaluation of MMF colitis.        Diffuse mild inflammation characterized by congestion (edema), erythema,        friability and granularity was found in the ascending colon and in the        cecum.        Multiple small and large-mouthed diverticula were found in the sigmoid        colon.        There is no endoscopic evidence of polyps in the entire colon.   Final Diagnosis   A. RIGHT COLON, RANDOM BIOPSY:   Colonic mucosa with mild patchy fibrosis and chronic inflammation, no evidence of microscopic or chronic colitis or other significant histologic abnormality   B. LEFT COLON, RANDOM BIOPSY:   Colonic mucosa with no evidence of microscopic or chronic colitis or other significant histologic abnormality    C. RECTUM, BIOPSY:  Rectal mucosa with mild patchy fibrosis, no evidence of microscopic or chronic colitis or other significant histologic abnormality ( see comment)     Maribel is currently doing well.  Unfortunately, she recently had a heart attack while she was in the hospital.  She also feels like her fecal clustering has somewhat worsened.  Denies blood per rectum.  Continues to have urgency and some fecal seepage but no incontinence to formed stool.    Medical history:  Past Medical History:   Diagnosis Date     Abnormal coagulation profile     p [id number]>A heterozygote      Age-related osteoporosis without current pathological fracture 06/22/2019     Anemia      Antiplatelet or antithrombotic long-term use       ASCUS with positive high risk HPV 2007, 2015    + HPV 56, 54,& 6, colp - TAL III, Leep =TAL II     Basal cell carcinoma      Congestive heart failure, unspecified HF chronicity, unspecified heart failure type (H) 06/22/2021     COPD (chronic obstructive pulmonary disease) (H)      Depressive disorder 07/2015     Hypertension      Immunosuppressed status (H)     due meds     Kidney replaced by transplant 09/2004    Living donor recipient,  Rejection 7/2005     LSIL (low grade squamous intraepithelial lesion) on Pap smear 04/2013    +HPV 33 or 45, 61       PAD (peripheral artery disease) (H)      PONV (postoperative nausea and vomiting)      Squamous cell lung cancer (H)      Thrombosis of leg 1967     Unspecified disorder of kidney and ureter     X-linked dominant Alport's syndrome.       Surgical history:  Past Surgical History:   Procedure Laterality Date     BIOPSY      Kidney, Lung, Breast     BIOPSY ANAL N/A 03/14/2018    Procedure: BIOPSY ANAL;;  Surgeon: Shabbir Leo MD;  Location: [facility] OR     BYPASS GRAFT FEMORAL FEMORAL Bilateral 04/25/2021    Procedure: Axplantation infected graft, femoral to femoral bypass with cadaveric artery, bilateral SATORIUS MUSCLE FLAP CREATION, evacuation of absece, vacuum closure;  Surgeon: Raven Lewis MD;  Location: [facility] OR     COLONOSCOPY       COLONOSCOPY N/A 08/09/2017    Procedure: COMBINED COLONOSCOPY, SINGLE OR MULTIPLE BIOPSY/POLYPECTOMY BY BIOPSY;;  Surgeon: Sushil Hyatt MD;  Location: [facility] GI     COLONOSCOPY N/A 7/28/2022    Procedure: COLONOSCOPY, WITH BIOPSY;  Surgeon: Moise Corcoran MD;  Location: [facility] GI     COLPOSCOPY,LOOP ELECTRD CERVIX EXCIS  03/11/2008    TAL II     CONIZATION LEEP  07/17/2013    Procedure: CONIZATION LEEP;;  Surgeon: Liliana Renteria MD;  [facility]: [facility]     CONIZATION LEEP N/A 08/17/2016    Procedure: CONIZATION LEEP;  Surgeon: Liliana Renteria MD;  [facility]     CV CORONARY ANGIOGRAM N/A 04/06/2021    Procedure: CV  CORONARY ANGIOGRAM;  Surgeon: Sincere Rosas MD;  Location:  HEART CARDIAC CATH LAB     CV CORONARY ANGIOGRAM N/A 04/25/2021    Procedure: Coronary Angiogram;  Surgeon: Sincere Rosas MD;  Location: [facility] CARDIAC CATH LAB     CV PCI STENT DRUG ELUTING N/A 04/06/2021    Procedure: Percutaneous Coronary Intervention Stent Drug Eluting;  Surgeon: Sincere Rosas MD;  Location: [facility] CARDIAC CATH LAB     ENDOVASCULAR REPAIR ANEURYSM AORTOILIAC Bilateral 04/06/2021    Procedure: Endovascular Abdominal Aortic Aneurysm Repair with Aortouniiliac Device and Femoral-Femoral Artery Bypass with [id number] Artegraft;  Surgeon: Abena Ferrara MD;  [facility]     EXAM UNDER ANESTHESIA ANUS  07/15/2014    Procedure: EXAM UNDER ANESTHESIA ANUS;  Surgeon: Radha Musa MD;  [facility]     EXAM UNDER ANESTHESIA ANUS N/A 03/14/2018    Procedure: EXAM UNDER ANESTHESIA ANUS;  Anal Exam Under Anesthesia With Excision of anal lesion, proctoscopy;  Surgeon: Shabbir Leo MD;  Location: [facility] OR     EYE SURGERY       GENITOURINARY SURGERY       IR OR ANGIOGRAM  04/06/2021     IRRIGATION AND DEBRIDEMENT GROIN Right 04/24/2021    Procedure: IRRIGATION AND DEBRIDEMENT, INGUINAL REGION, I & D PF RIGHT GROIN;  Surgeon: Raven Lewis MD;  [facility]     IRRIGATION AND DEBRIDEMENT GROIN N/A 04/26/2021    Procedure: IRRIGATION AND DEBRIDEMENT, INGUINAL REGION, PLACEMENT OF VERAFLO WOUND VAC, WOUND WASHOUT,;  Surgeon: Raven Lewis MD;  [facility]: [facility]     LASER CO2 EXCISE VULVA WIDE LOCAL  07/15/2014    Procedure: LASER CO2 EXCISE VULVA WIDE LOCAL;  Surgeon: Liliana Renteria MD;  [facility]     LASER CO2 VAGINA  07/17/2013    Procedure: LASER CO2 VAGINA;;  Surgeon: Liliana Renteria MD;  [facility]     LASER CO2 VAGINA N/A 09/25/2018    Procedure: LASER CO2 VAGINA;  Exam Under Anesthesia, CO2 Laser Ablation of Upper Vagina and Cervix;  Surgeon: Radha  MD Pati;  Location: [facility]     MICROSCOPY ANAL  07/17/2013    Procedure: MICROSCOPY ANAL;  Anal Microscopy,  EUA vagina,Colposcopy Of Vagina And Vulva, Vaginal Biopsies, Omniguide Co2 Laser To Vagina and vulva, Loop Electrosurgical Excision Procedure To Cervix;  Surgeon: Radha Musa MD;  Location: [facility]     MICROSCOPY ANAL  07/15/2014    Procedure: MICROSCOPY ANAL;  Surgeon: Radha Musa MD;  [facility]: [facility]     PICC DOUBLE LUMEN PLACEMENT Right 04/30/2021    39cm, Basilic vein     TRANSESOPHAGEAL ECHOCARDIOGRAM INTRAOPERATIVE N/A 04/28/2021    Procedure: ECHOCARDIOGRAM, TRANSESOPHAGEAL, INTRAOPERATIVE;  Surgeon: GENERIC ANESTHESIA PROVIDER;  Location: [facility] OR     VASCULAR SURGERY      Thrombectomy     ZZC NONSPECIFIC PROCEDURE      Thrombectomy     ZC NONSPECIFIC PROCEDURE  1955 and 1959    Bilater eye surgery - correction for crossed eyes     Z NONSPECIFIC PROCEDURE  1998    oopherectomy L     Z NONSPECIFIC PROCEDURE  1967    open kidney biopsy - L     [facility] TRANSPLANTATION OF KIDNEY  09/2004    recipient -- done at [facility]       Family history:  Family History   Problem Relation Age of Onset     Diabetes Father      Alcohol/Drug Father      Arthritis Father      Hypertension Father      Lipids Father         high cholesterol     Arthritis Mother      Diabetes Mother      Depression Mother      Heart Disease Mother      Neurologic Disorder Mother      Obesity Mother      Psychotic Disorder Mother      Thyroid Disease Mother      Hypertension Mother      Gynecology Sister         Precancerous cell removal from cervix at age 45     Depression Sister      Allergies Sister      Alcohol/Drug Sister      Neurologic Disorder Sister      Cerebrovascular Disease Paternal Grandmother      Diabetes Paternal Grandmother      Alcohol/Drug Son      Colon Polyps Sister      Breast Cancer Niece      Other Cancer Sister         Cervical     Obesity Sister      Depression Sister      Substance Abuse  Son      Substance Abuse Sister              Asthma Other      Colon Cancer No family hx of      Crohn's Disease No family hx of      Ulcerative Colitis No family hx of      Melanoma No family hx of      Skin Cancer No family hx of        Medications:  Current Outpatient Medications   Medication Sig Dispense Refill     acetaminophen (TYLENOL) 325 MG tablet Take 2 tablets (650 mg) by mouth every 4 hours as needed for other (For optimal non-opioid multimodal pain management to improve pain control.) 100 tablet 3     aspirin (ASA) 81 MG chewable tablet Take 1 tablet (81 mg) by mouth daily (Patient taking differently: Take 81 mg by mouth every morning) 90 tablet 3     atorvastatin (LIPITOR) 80 MG tablet Take 1 tablet (80 mg) by mouth daily (Patient taking differently: Take 80 mg by mouth every morning) 90 tablet 3     bisacodyl (DULCOLAX) 5 MG EC tablet Take as directed. One day before exam take 2 tablets at 3 PM. Take 2 tablets at 11 PM. 4 tablet 0     Calcium Carb-Cholecalciferol (CALCIUM CARBONATE-VITAMIN D3) 600-400 MG-UNIT TABS TAKE ONE TABLET BY MOUTH TWICE A  tablet 3     calcium carbonate 600 mg-vitamin D 400 units (CALTRATE) 600-400 MG-UNIT per tablet Take 1 tablet by mouth 2 times daily 120 tablet 3     clopidogrel (PLAVIX) 75 MG tablet Take 1 tablet (75 mg) by mouth daily 90 tablet 3     fluticasone (FLONASE) 50 MCG/ACT nasal spray Spray 1 spray into both nostrils daily (Patient taking differently: Spray 1 spray into both nostrils every morning) 18.2 mL 3     hydrALAZINE (APRESOLINE) 10 MG tablet Take 1 tablet (10 mg) by mouth 3 times daily 270 tablet 3     isosorbide mononitrate (IMDUR) 60 MG 24 hr tablet Take 1 tablet (60 mg) by mouth daily 90 tablet 0     Lactobacillus-Inulin ([facility] DIGESTIVE HEALTH) CAPS TAKE ONE CAPSULE BY MOUTH ONCE DAILY (DUE FOR PHYSICAL IN MARCH) (Patient taking differently: every morning) 90 capsule 3     lisinopril (ZESTRIL) 2.5 MG tablet Take 1 tablet (2.5 mg)  by mouth daily (Patient taking differently: Take 2.5 mg by mouth every morning) 90 tablet 3     metoprolol tartrate (LOPRESSOR) 100 MG tablet Take 1 tablet (100 mg) by mouth 2 times daily 180 tablet 2     Nutritional Supplements (ENSURE CLEAR) LIQD Take 1 Bottle by mouth daily 396 mL 11     pantoprazole (PROTONIX) 40 MG EC tablet Take 1 tablet (40 mg) by mouth every morning (before breakfast) 60 tablet 3     predniSONE (DELTASONE) 5 MG tablet Take 1 tablet (5 mg) by mouth daily (Patient taking differently: Take 5 mg by mouth every morning) 90 tablet 3     psyllium (METAMUCIL/KONSYL) 58.6 % powder Take by mouth every morning       sirolimus (GENERIC EQUIVALENT) 1 MG tablet Take 2 tablets (2 mg) by mouth daily 60 tablet 11     torsemide (DEMADEX) 10 MG tablet Take 1 tablet (10 mg) by mouth daily Additional use as directed by [facility] clinic. 90 tablet 1     polyethylene glycol (GOLYTELY) 236 g suspension Take as directed. One day before exam fill the jug with water. Cover and shake until well mixed. At 6 PM start drinking an 8oz glass of mixture every 15 minutes until jug is 1/2 empty. Store remainder in the refrigerator.  At 11 PM Start drinking the other half of the Golytely jug. Drink one 8-ounce glass every 15 minutes until the jug is empty. 4000 mL 0       Allergies:  Allergies   Allergen Reactions     Blood Transfusion Related (Informational Only) Other (See Comments)     Patient has a history of a clinically significant antibody against RBC antigens.  A delay in compatible RBCs may occur.     Ultracet Nausea and Vomiting and Hives     Hydrocodone Nausea and Vomiting and Hives       Social history:   Social History     Tobacco Use     Smoking status: Light Smoker     Packs/day: 0.30     Years: 35.00     Pack years: 10.50     Types: Cigarettes     Start date: 1967     Last attempt to quit: 3/1/2021     Years since quittin.7     Smokeless tobacco: Never     Tobacco comments:     Couple times a week. 1-2 cigs  "1-2x a week.   Substance Use Topics     Alcohol use: Not Currently     Comment: rarely     Marital status: .    Physical Examination:  BP (!) 144/87 (BP Location: Left arm, Patient Position: Sitting, Cuff Size: Adult Regular)   Pulse 70   Ht 5' 1\"   Wt 90 lb 14.4 oz   SpO2 100%   BMI 17.18 kg/m    General: Well hydrated. No acute distress.  Abdomen: No inguinal adenopathy palpated.  Perianal external examination:  Perianal skin: Hyperpigmentation and butterfly-like distribution.  Lesions: Left anterior perianal skin scar with no evidence of neoplasia.  Eversion of buttocks: There was not evidence of an anal fissure. Details: N/A.  Skin tags or external hemorrhoids: Yes: Small and nonthrombosed.  Digital rectal examination: Was performed.   Sphincter tone: Fair.  Palpable lesions: No.  Other: A small  rectocele was appreciated on bearing down.  Bimanual examination: was not performed.    Anoscopy: Was performed.   Hemorrhoids: Yes.  Grade 2-3 with no evidence of thrombosis or bleeding.  Mild mucosal prolapse that reduces spontaneously.  Some mild ectropion into the anal canal at the anterior base of the anal canal.  Lesions: No    Investigations:  None.     Procedures:  None.    ASSESSMENT  71-year-old female with significant comorbidities status post chemoradiotherapy for anal canal squamous cell carcinoma.  No evidence of recurrence.    PLAN  1.  Repeat anoscopy with anorectal exam in 1 year.  Rest of cancer surveillance per medical oncology.    45 minutes spent on the date of encounter (excluding time performing procedures with or without biopsy) performing chart review, history and exam, documentation and further activities as noted above.      Carson Russ MD, MSc, FACS, FASCRS.    Chief, Division of Colon & Rectal Surgery  Stanley M. Goldberg, MD Endowed Chair, Colon & Rectal Surgery  Department of Surgery  [facility]      Referring Provider:  Referred Self, " MD  No address on file     Primary Care Provider:  Lisa Martinez

## 2022-11-28 NOTE — TELEPHONE ENCOUNTER
Called [employer] to check on status of enrollment and was told they are still missing patients information  Patient id #[id number]

## 2022-11-28 NOTE — PATIENT INSTRUCTIONS
Follow up:  Follow up in clinic for exam in 1 year    Please call with any questions or concerns regarding your clinic visit today.    It is a pleasure being involved in your health care.    Contacts post-consultation depending on your need:    Radiology Appointments [phone]    Schedule Clinic Appointments [phone] # 1   M-F 7:30 - 5 pm    AV[facility] Kenny [phone]    Clinic Fax Number [phone]    Surgery Scheduling [phone]    My Chart is available 24 hours a day and is a secure way to access your records and communicate with your care team.  I strongly recommend signing up if you haven't already done so, if you are comfortable with computers.  If you would like to inquire about this or are having problems with My Chart access, you may call [phone] or go online at [email or web address].  Please allow at least 24 hours for a response and extra time on weekends and Holidays.

## 2022-12-02 NOTE — TELEPHONE ENCOUNTER
[employer] is no longer taking any new patients or re-enrollments. Free drug ends as of 12/31/2022  Emailed the team to inform so we can look into alternatives

## 2022-12-05 DIAGNOSIS — T81.49XA WOUND INFECTION AFTER SURGERY: Primary | ICD-10-CM

## 2022-12-05 RX ORDER — PANTOPRAZOLE SODIUM 40 MG/1
40 TABLET, DELAYED RELEASE ORAL
Qty: 90 TABLET | Refills: 0 | OUTPATIENT
Start: 2022-12-05

## 2022-12-06 ENCOUNTER — TELEPHONE (OUTPATIENT)
Dept: TRANSPLANT | Facility: CLINIC | Age: 72
End: 2022-12-06

## 2022-12-06 NOTE — TELEPHONE ENCOUNTER
Galina Young RN Ututalum, Teresa, RN Yvonne is enrolled in the [employer] free drug program for Rapamune that is ending 1/1/2023.   The only alternative free drug programs at this time are CellCept and Envarsus (kidney only).     Please work with MD to determine an alternative if cost of drug is not feasible.     Thanks!     Sayda Young RN, BSN, [facility]   Patient Care Supervisor, Post Abdominal Transplant         Med history:  Tac (alopecia)  to CSA (on going neoplasias) to Sirolimus (surgery) to MPA  Was kept on MPA d/t complicated post-op course  Switching MPA back to mTORi d/t colonoscopy results showing inflammation and scarring possibly    OUTCOME:  Message sent to Beatriz Walton (Pharmacy Liaison) to check how much copay will patient shoulder if she is to continue with Sirolimus.      ADDENDUM:  Beatriz Larios Teresa, RN  Caller: Unspecified (Yesterday,  2:27 PM)  Lucas Montanez.   I did a test claim on the sirolimus 1mg 60 per 30 days= $364.13   I also did Sirolimus 2mg 30 per 30 =$345.49     There is also Gengraf free drug available as well. Im not sure if that medication is ok for her or not.     Please let me know if you need me to do any test claims for any other medications   Thank you   Beatriz       ----- Message -----   From: Lisseth Heath RN   Sent: 12/6/2022   2:38 PM CST   To: Beatriz Larios     ----- Message from Lisseth Heath RN sent at 12/6/2022  2:38 PM CST -----   Beatriz,   How much would her copay be if she were to continue on Sirolimus?   Thanks,Torey        Left VM with Maribel regarding plan to switch from Sirolimus.  Message sent to Dr. See.

## 2022-12-08 ENCOUNTER — TELEPHONE (OUTPATIENT)
Dept: TRANSPLANT | Facility: CLINIC | Age: 72
End: 2022-12-08

## 2022-12-08 NOTE — TELEPHONE ENCOUNTER
Hitesh See MD Ututalum, Teresa, RN  I would try her back on mycophenolic acid 360 mg bid.  The pathology on her colonoscopy wasn't classic for MPA toxicity.       ----- Message -----   From: Lisseth Heath RN   Sent: 12/7/2022   3:06 PM CST   To: Hitesh See MD   Subject: free drug (sirolimus) no longer available         Dr. See,     Med history:   Tac (alopecia)  to CSA (on going neoplasias) to Sirolimus (surgery) to MPA   Was kept on MPA d/t complicated post-op course   Switching MPA back to mTORi d/t colonoscopy results showing inflammation and scarring possibly d/t mpa.     However, free drug (sirolimus) no longer available starting January.   Pharmacy Liaison did test claim and copay was $350 / month.     What do you recommend?     ThanksTorey      ADDENDUM:  Left   [employer] message sent

## 2022-12-16 ENCOUNTER — MYC MEDICAL ADVICE (OUTPATIENT)
Dept: FAMILY MEDICINE | Facility: CLINIC | Age: 72
End: 2022-12-16

## 2022-12-16 DIAGNOSIS — R09.A2 GLOBUS SENSATION: Primary | ICD-10-CM

## 2022-12-16 DIAGNOSIS — K21.00 GASTROESOPHAGEAL REFLUX DISEASE WITH ESOPHAGITIS, UNSPECIFIED WHETHER HEMORRHAGE: ICD-10-CM

## 2022-12-16 DIAGNOSIS — T81.49XA WOUND INFECTION AFTER SURGERY: ICD-10-CM

## 2022-12-19 NOTE — TELEPHONE ENCOUNTER
PN,      Please see [employer] message.  Last OV 5/17/22 - unable to find any documentation of throat issue.    Do you want to see patient?  VV?    Monse Hopson RN

## 2022-12-20 NOTE — TELEPHONE ENCOUNTER
I think we should have her start with EGD to look at the esophagus.  I have place an order.    Until she can be seen we can have her double the protonix from once a day to twice a day to see if helpful  Try for 2 weeks   PN

## 2022-12-26 RX ORDER — PANTOPRAZOLE SODIUM 40 MG/1
TABLET, DELAYED RELEASE ORAL
Qty: 60 TABLET | Refills: 0 | Status: SHIPPED | OUTPATIENT
Start: 2022-12-26 | End: 2023-01-01

## 2022-12-28 DIAGNOSIS — T81.49XA WOUND INFECTION AFTER SURGERY: ICD-10-CM

## 2022-12-28 DIAGNOSIS — I20.1 CORONARY ARTERY VASOSPASM (H): ICD-10-CM

## 2022-12-28 DIAGNOSIS — I71.40 ABDOMINAL AORTIC ANEURYSM (AAA) WITHOUT RUPTURE (H): ICD-10-CM

## 2022-12-28 DIAGNOSIS — I21.11 ST ELEVATION MYOCARDIAL INFARCTION INVOLVING RIGHT CORONARY ARTERY (H): ICD-10-CM

## 2023-01-01 ENCOUNTER — ANCILLARY PROCEDURE (OUTPATIENT)
Dept: CT IMAGING | Facility: CLINIC | Age: 73
End: 2023-01-01
Attending: SURGERY
Payer: COMMERCIAL

## 2023-01-01 ENCOUNTER — ANCILLARY PROCEDURE (OUTPATIENT)
Dept: ULTRASOUND IMAGING | Facility: CLINIC | Age: 73
End: 2023-01-01
Attending: SURGERY
Payer: COMMERCIAL

## 2023-01-01 ENCOUNTER — NURSE TRIAGE (OUTPATIENT)
Dept: NURSING | Facility: CLINIC | Age: 73
End: 2023-01-01
Payer: COMMERCIAL

## 2023-01-01 ENCOUNTER — TELEPHONE (OUTPATIENT)
Dept: TRANSPLANT | Facility: CLINIC | Age: 73
End: 2023-01-01
Payer: COMMERCIAL

## 2023-01-01 ENCOUNTER — APPOINTMENT (OUTPATIENT)
Dept: GENERAL RADIOLOGY | Facility: CLINIC | Age: 73
DRG: 177 | End: 2023-01-01
Attending: FAMILY MEDICINE
Payer: COMMERCIAL

## 2023-01-01 ENCOUNTER — APPOINTMENT (OUTPATIENT)
Dept: PHYSICAL THERAPY | Facility: CLINIC | Age: 73
DRG: 391 | End: 2023-01-01
Payer: COMMERCIAL

## 2023-01-01 ENCOUNTER — APPOINTMENT (OUTPATIENT)
Dept: ULTRASOUND IMAGING | Facility: CLINIC | Age: 73
DRG: 177 | End: 2023-01-01
Payer: COMMERCIAL

## 2023-01-01 ENCOUNTER — LAB (OUTPATIENT)
Dept: LAB | Facility: CLINIC | Age: 73
End: 2023-01-01
Attending: PHYSICIAN ASSISTANT
Payer: COMMERCIAL

## 2023-01-01 ENCOUNTER — VIRTUAL VISIT (OUTPATIENT)
Dept: PULMONOLOGY | Facility: CLINIC | Age: 73
End: 2023-01-01
Attending: INTERNAL MEDICINE
Payer: COMMERCIAL

## 2023-01-01 ENCOUNTER — MYC MEDICAL ADVICE (OUTPATIENT)
Dept: FAMILY MEDICINE | Facility: CLINIC | Age: 73
End: 2023-01-01

## 2023-01-01 ENCOUNTER — MYC MEDICAL ADVICE (OUTPATIENT)
Dept: PULMONOLOGY | Facility: CLINIC | Age: 73
End: 2023-01-01

## 2023-01-01 ENCOUNTER — TELEPHONE (OUTPATIENT)
Dept: OTHER | Facility: CLINIC | Age: 73
End: 2023-01-01
Payer: COMMERCIAL

## 2023-01-01 ENCOUNTER — LAB (OUTPATIENT)
Dept: LAB | Facility: CLINIC | Age: 73
End: 2023-01-01
Payer: COMMERCIAL

## 2023-01-01 ENCOUNTER — APPOINTMENT (OUTPATIENT)
Dept: GENERAL RADIOLOGY | Facility: CLINIC | Age: 73
DRG: 177 | End: 2023-01-01
Attending: EMERGENCY MEDICINE
Payer: COMMERCIAL

## 2023-01-01 ENCOUNTER — PATIENT OUTREACH (OUTPATIENT)
Dept: CARE COORDINATION | Facility: CLINIC | Age: 73
End: 2023-01-01

## 2023-01-01 ENCOUNTER — TELEPHONE (OUTPATIENT)
Dept: FAMILY MEDICINE | Facility: CLINIC | Age: 73
End: 2023-01-01

## 2023-01-01 ENCOUNTER — VIRTUAL VISIT (OUTPATIENT)
Dept: PULMONOLOGY | Facility: CLINIC | Age: 73
End: 2023-01-01
Attending: PHYSICIAN ASSISTANT
Payer: COMMERCIAL

## 2023-01-01 ENCOUNTER — VIRTUAL VISIT (OUTPATIENT)
Dept: FAMILY MEDICINE | Facility: CLINIC | Age: 73
End: 2023-01-01
Payer: COMMERCIAL

## 2023-01-01 ENCOUNTER — VIRTUAL VISIT (OUTPATIENT)
Dept: VASCULAR SURGERY | Facility: CLINIC | Age: 73
End: 2023-01-01
Payer: COMMERCIAL

## 2023-01-01 ENCOUNTER — TELEPHONE (OUTPATIENT)
Dept: ONCOLOGY | Facility: CLINIC | Age: 73
End: 2023-01-01
Payer: COMMERCIAL

## 2023-01-01 ENCOUNTER — PRE VISIT (OUTPATIENT)
Dept: GASTROENTEROLOGY | Facility: CLINIC | Age: 73
End: 2023-01-01

## 2023-01-01 ENCOUNTER — APPOINTMENT (OUTPATIENT)
Dept: GENERAL RADIOLOGY | Facility: CLINIC | Age: 73
End: 2023-01-01
Attending: EMERGENCY MEDICINE
Payer: COMMERCIAL

## 2023-01-01 ENCOUNTER — PATIENT OUTREACH (OUTPATIENT)
Dept: OTHER | Facility: CLINIC | Age: 73
End: 2023-01-01
Payer: COMMERCIAL

## 2023-01-01 ENCOUNTER — HOSPITAL ENCOUNTER (INPATIENT)
Facility: CLINIC | Age: 73
LOS: 5 days | Discharge: HOME | DRG: 177 | End: 2023-09-10
Attending: EMERGENCY MEDICINE | Admitting: STUDENT IN AN ORGANIZED HEALTH CARE EDUCATION/TRAINING PROGRAM
Payer: COMMERCIAL

## 2023-01-01 ENCOUNTER — ANESTHESIA (OUTPATIENT)
Dept: SURGERY | Facility: CLINIC | Age: 73
DRG: 253 | End: 2023-01-01
Payer: COMMERCIAL

## 2023-01-01 ENCOUNTER — APPOINTMENT (OUTPATIENT)
Dept: PHYSICAL THERAPY | Facility: CLINIC | Age: 73
DRG: 177 | End: 2023-01-01
Payer: COMMERCIAL

## 2023-01-01 ENCOUNTER — OFFICE VISIT (OUTPATIENT)
Dept: CARDIOLOGY | Facility: CLINIC | Age: 73
End: 2023-01-01
Attending: CASE MANAGER/CARE COORDINATOR
Payer: COMMERCIAL

## 2023-01-01 ENCOUNTER — TELEPHONE (OUTPATIENT)
Dept: TRANSPLANT | Facility: CLINIC | Age: 73
End: 2023-01-01

## 2023-01-01 ENCOUNTER — TELEPHONE (OUTPATIENT)
Dept: FAMILY MEDICINE | Facility: CLINIC | Age: 73
End: 2023-01-01
Payer: COMMERCIAL

## 2023-01-01 ENCOUNTER — MYC MEDICAL ADVICE (OUTPATIENT)
Dept: FAMILY MEDICINE | Facility: CLINIC | Age: 73
End: 2023-01-01
Payer: COMMERCIAL

## 2023-01-01 ENCOUNTER — MEDICAL CORRESPONDENCE (OUTPATIENT)
Dept: HEALTH INFORMATION MANAGEMENT | Facility: CLINIC | Age: 73
End: 2023-01-01

## 2023-01-01 ENCOUNTER — MYC MEDICAL ADVICE (OUTPATIENT)
Dept: PULMONOLOGY | Facility: CLINIC | Age: 73
End: 2023-01-01
Payer: COMMERCIAL

## 2023-01-01 ENCOUNTER — PATIENT OUTREACH (OUTPATIENT)
Dept: NURSING | Facility: CLINIC | Age: 73
End: 2023-01-01
Payer: COMMERCIAL

## 2023-01-01 ENCOUNTER — APPOINTMENT (OUTPATIENT)
Dept: GENERAL RADIOLOGY | Facility: CLINIC | Age: 73
DRG: 391 | End: 2023-01-01
Attending: INTERNAL MEDICINE
Payer: COMMERCIAL

## 2023-01-01 ENCOUNTER — MEDICAL CORRESPONDENCE (OUTPATIENT)
Dept: HEALTH INFORMATION MANAGEMENT | Facility: CLINIC | Age: 73
End: 2023-01-01
Payer: COMMERCIAL

## 2023-01-01 ENCOUNTER — ANCILLARY PROCEDURE (OUTPATIENT)
Dept: CT IMAGING | Facility: CLINIC | Age: 73
End: 2023-01-01
Attending: PHYSICIAN ASSISTANT
Payer: COMMERCIAL

## 2023-01-01 ENCOUNTER — HOSPITAL ENCOUNTER (EMERGENCY)
Facility: CLINIC | Age: 73
Discharge: HOME | End: 2023-11-28
Attending: EMERGENCY MEDICINE | Admitting: EMERGENCY MEDICINE
Payer: COMMERCIAL

## 2023-01-01 ENCOUNTER — PATIENT OUTREACH (OUTPATIENT)
Dept: CARE COORDINATION | Facility: CLINIC | Age: 73
End: 2023-01-01
Payer: COMMERCIAL

## 2023-01-01 ENCOUNTER — TELEPHONE (OUTPATIENT)
Dept: OTHER | Facility: CLINIC | Age: 73
End: 2023-01-01

## 2023-01-01 ENCOUNTER — ALLIED HEALTH/NURSE VISIT (OUTPATIENT)
Dept: OTHER | Facility: CLINIC | Age: 73
End: 2023-01-01
Payer: COMMERCIAL

## 2023-01-01 ENCOUNTER — TELEPHONE (OUTPATIENT)
Dept: SURGERY | Facility: CLINIC | Age: 73
End: 2023-01-01
Payer: COMMERCIAL

## 2023-01-01 ENCOUNTER — OFFICE VISIT (OUTPATIENT)
Dept: FAMILY MEDICINE | Facility: CLINIC | Age: 73
End: 2023-01-01
Payer: COMMERCIAL

## 2023-01-01 ENCOUNTER — ANESTHESIA EVENT (OUTPATIENT)
Dept: SURGERY | Facility: CLINIC | Age: 73
DRG: 253 | End: 2023-01-01
Payer: COMMERCIAL

## 2023-01-01 ENCOUNTER — APPOINTMENT (OUTPATIENT)
Dept: CT IMAGING | Facility: CLINIC | Age: 73
End: 2023-01-01
Attending: EMERGENCY MEDICINE
Payer: COMMERCIAL

## 2023-01-01 ENCOUNTER — MYC MEDICAL ADVICE (OUTPATIENT)
Dept: ONCOLOGY | Facility: CLINIC | Age: 73
End: 2023-01-01
Payer: COMMERCIAL

## 2023-01-01 ENCOUNTER — HEALTH MAINTENANCE LETTER (OUTPATIENT)
Age: 73
End: 2023-01-01

## 2023-01-01 ENCOUNTER — APPOINTMENT (OUTPATIENT)
Dept: CARDIOLOGY | Facility: CLINIC | Age: 73
DRG: 177 | End: 2023-01-01
Payer: COMMERCIAL

## 2023-01-01 ENCOUNTER — TELEPHONE (OUTPATIENT)
Dept: NEPHROLOGY | Facility: CLINIC | Age: 73
End: 2023-01-01

## 2023-01-01 ENCOUNTER — APPOINTMENT (OUTPATIENT)
Dept: PHYSICAL THERAPY | Facility: CLINIC | Age: 73
DRG: 391 | End: 2023-01-01
Attending: INTERNAL MEDICINE
Payer: COMMERCIAL

## 2023-01-01 ENCOUNTER — ONCOLOGY VISIT (OUTPATIENT)
Dept: ONCOLOGY | Facility: CLINIC | Age: 73
End: 2023-01-01
Attending: PHYSICIAN ASSISTANT
Payer: COMMERCIAL

## 2023-01-01 ENCOUNTER — OFFICE VISIT (OUTPATIENT)
Dept: CARDIOLOGY | Facility: CLINIC | Age: 73
End: 2023-01-01
Attending: NURSE PRACTITIONER
Payer: COMMERCIAL

## 2023-01-01 ENCOUNTER — HOSPITAL ENCOUNTER (INPATIENT)
Facility: CLINIC | Age: 73
LOS: 6 days | Discharge: HOME HEALTH | DRG: 391 | End: 2023-10-01
Attending: EMERGENCY MEDICINE | Admitting: INTERNAL MEDICINE
Payer: COMMERCIAL

## 2023-01-01 ENCOUNTER — HOSPITAL ENCOUNTER (OUTPATIENT)
Facility: CLINIC | Age: 73
Discharge: HOME | DRG: 253 | End: 2023-06-26
Attending: NURSE PRACTITIONER | Admitting: FAMILY MEDICINE
Payer: COMMERCIAL

## 2023-01-01 ENCOUNTER — TRANSCRIBE ORDERS (OUTPATIENT)
Dept: ONCOLOGY | Facility: CLINIC | Age: 73
End: 2023-01-01
Payer: COMMERCIAL

## 2023-01-01 ENCOUNTER — MYC MEDICAL ADVICE (OUTPATIENT)
Dept: SURGERY | Facility: CLINIC | Age: 73
End: 2023-01-01
Payer: COMMERCIAL

## 2023-01-01 ENCOUNTER — HOSPITAL ENCOUNTER (INPATIENT)
Facility: CLINIC | Age: 73
LOS: 2 days | Discharge: HOME | DRG: 177 | End: 2023-09-01
Attending: FAMILY MEDICINE | Admitting: FAMILY MEDICINE
Payer: COMMERCIAL

## 2023-01-01 ENCOUNTER — APPOINTMENT (OUTPATIENT)
Dept: GENERAL RADIOLOGY | Facility: CLINIC | Age: 73
DRG: 177 | End: 2023-01-01
Payer: COMMERCIAL

## 2023-01-01 ENCOUNTER — TELEPHONE (OUTPATIENT)
Dept: CARDIOLOGY | Facility: CLINIC | Age: 73
End: 2023-01-01
Payer: COMMERCIAL

## 2023-01-01 ENCOUNTER — APPOINTMENT (OUTPATIENT)
Dept: CT IMAGING | Facility: CLINIC | Age: 73
DRG: 391 | End: 2023-01-01
Attending: EMERGENCY MEDICINE
Payer: COMMERCIAL

## 2023-01-01 ENCOUNTER — TELEPHONE (OUTPATIENT)
Dept: VASCULAR SURGERY | Facility: CLINIC | Age: 73
End: 2023-01-01
Payer: COMMERCIAL

## 2023-01-01 ENCOUNTER — HOSPITAL ENCOUNTER (EMERGENCY)
Facility: CLINIC | Age: 73
Discharge: HOME | End: 2023-11-23
Attending: EMERGENCY MEDICINE | Admitting: EMERGENCY MEDICINE
Payer: COMMERCIAL

## 2023-01-01 ENCOUNTER — MEDICAL CORRESPONDENCE (OUTPATIENT)
Dept: FAMILY MEDICINE | Facility: CLINIC | Age: 73
End: 2023-01-01
Payer: COMMERCIAL

## 2023-01-01 ENCOUNTER — TELEPHONE (OUTPATIENT)
Dept: NEPHROLOGY | Facility: CLINIC | Age: 73
End: 2023-01-01
Payer: COMMERCIAL

## 2023-01-01 ENCOUNTER — HOSPITAL ENCOUNTER (INPATIENT)
Facility: CLINIC | Age: 73
LOS: 1 days | Discharge: HOME | DRG: 253 | End: 2023-06-28
Attending: SURGERY | Admitting: SURGERY
Payer: COMMERCIAL

## 2023-01-01 ENCOUNTER — MYC MEDICAL ADVICE (OUTPATIENT)
Dept: VASCULAR SURGERY | Facility: CLINIC | Age: 73
End: 2023-01-01
Payer: COMMERCIAL

## 2023-01-01 ENCOUNTER — APPOINTMENT (OUTPATIENT)
Dept: GENERAL RADIOLOGY | Facility: CLINIC | Age: 73
End: 2023-01-01
Attending: FAMILY MEDICINE
Payer: COMMERCIAL

## 2023-01-01 ENCOUNTER — APPOINTMENT (OUTPATIENT)
Dept: CARDIOLOGY | Facility: CLINIC | Age: 73
DRG: 177 | End: 2023-01-01
Attending: FAMILY MEDICINE
Payer: COMMERCIAL

## 2023-01-01 ENCOUNTER — PATIENT OUTREACH (OUTPATIENT)
Dept: CARDIOLOGY | Facility: CLINIC | Age: 73
End: 2023-01-01
Payer: COMMERCIAL

## 2023-01-01 ENCOUNTER — APPOINTMENT (OUTPATIENT)
Dept: ULTRASOUND IMAGING | Facility: CLINIC | Age: 73
DRG: 391 | End: 2023-01-01
Attending: INTERNAL MEDICINE
Payer: COMMERCIAL

## 2023-01-01 ENCOUNTER — PATIENT OUTREACH (OUTPATIENT)
Dept: ONCOLOGY | Facility: CLINIC | Age: 73
End: 2023-01-01
Payer: COMMERCIAL

## 2023-01-01 ENCOUNTER — HOSPITAL ENCOUNTER (EMERGENCY)
Facility: CLINIC | Age: 73
Discharge: HOME | End: 2023-03-02
Attending: FAMILY MEDICINE | Admitting: FAMILY MEDICINE
Payer: COMMERCIAL

## 2023-01-01 ENCOUNTER — MYC MEDICAL ADVICE (OUTPATIENT)
Dept: CARDIOLOGY | Facility: CLINIC | Age: 73
End: 2023-01-01

## 2023-01-01 ENCOUNTER — TRANSFERRED RECORDS (OUTPATIENT)
Dept: HEALTH INFORMATION MANAGEMENT | Facility: CLINIC | Age: 73
End: 2023-01-01

## 2023-01-01 ENCOUNTER — TELEPHONE (OUTPATIENT)
Dept: VASCULAR SURGERY | Facility: CLINIC | Age: 73
End: 2023-01-01

## 2023-01-01 ENCOUNTER — ANCILLARY PROCEDURE (OUTPATIENT)
Dept: CT IMAGING | Facility: CLINIC | Age: 73
End: 2023-01-01
Attending: INTERNAL MEDICINE
Payer: COMMERCIAL

## 2023-01-01 ENCOUNTER — PRE VISIT (OUTPATIENT)
Dept: PULMONOLOGY | Facility: CLINIC | Age: 73
End: 2023-01-01
Payer: COMMERCIAL

## 2023-01-01 VITALS
TEMPERATURE: 99.5 F | HEIGHT: 61 IN | BODY MASS INDEX: 14.35 KG/M2 | RESPIRATION RATE: 18 BRPM | WEIGHT: 76 LBS | OXYGEN SATURATION: 95 % | HEART RATE: 78 BPM | SYSTOLIC BLOOD PRESSURE: 161 MMHG | DIASTOLIC BLOOD PRESSURE: 101 MMHG

## 2023-01-01 VITALS
BODY MASS INDEX: 17.75 KG/M2 | HEART RATE: 79 BPM | RESPIRATION RATE: 18 BRPM | OXYGEN SATURATION: 95 % | DIASTOLIC BLOOD PRESSURE: 73 MMHG | TEMPERATURE: 97.7 F | WEIGHT: 94 LBS | SYSTOLIC BLOOD PRESSURE: 118 MMHG | HEIGHT: 61 IN

## 2023-01-01 VITALS
HEIGHT: 61 IN | TEMPERATURE: 98.4 F | OXYGEN SATURATION: 97 % | SYSTOLIC BLOOD PRESSURE: 150 MMHG | WEIGHT: 85.1 LBS | DIASTOLIC BLOOD PRESSURE: 89 MMHG | RESPIRATION RATE: 18 BRPM | BODY MASS INDEX: 16.07 KG/M2 | HEART RATE: 76 BPM

## 2023-01-01 VITALS
HEART RATE: 85 BPM | DIASTOLIC BLOOD PRESSURE: 102 MMHG | TEMPERATURE: 97.8 F | OXYGEN SATURATION: 98 % | SYSTOLIC BLOOD PRESSURE: 152 MMHG | RESPIRATION RATE: 16 BRPM

## 2023-01-01 VITALS
SYSTOLIC BLOOD PRESSURE: 110 MMHG | HEART RATE: 75 BPM | RESPIRATION RATE: 17 BRPM | OXYGEN SATURATION: 100 % | TEMPERATURE: 97.6 F | DIASTOLIC BLOOD PRESSURE: 72 MMHG

## 2023-01-01 VITALS
WEIGHT: 83 LBS | HEIGHT: 61 IN | TEMPERATURE: 97.7 F | RESPIRATION RATE: 16 BRPM | SYSTOLIC BLOOD PRESSURE: 150 MMHG | BODY MASS INDEX: 15.67 KG/M2 | HEART RATE: 62 BPM | DIASTOLIC BLOOD PRESSURE: 95 MMHG | OXYGEN SATURATION: 96 %

## 2023-01-01 VITALS
DIASTOLIC BLOOD PRESSURE: 90 MMHG | SYSTOLIC BLOOD PRESSURE: 137 MMHG | WEIGHT: 89 LBS | HEART RATE: 74 BPM | OXYGEN SATURATION: 100 % | TEMPERATURE: 98 F | BODY MASS INDEX: 16.27 KG/M2

## 2023-01-01 VITALS — BODY MASS INDEX: 15.49 KG/M2 | HEIGHT: 61 IN

## 2023-01-01 VITALS
HEART RATE: 78 BPM | OXYGEN SATURATION: 97 % | SYSTOLIC BLOOD PRESSURE: 103 MMHG | BODY MASS INDEX: 16.05 KG/M2 | DIASTOLIC BLOOD PRESSURE: 73 MMHG | TEMPERATURE: 98.3 F | HEIGHT: 61 IN | RESPIRATION RATE: 20 BRPM | WEIGHT: 85 LBS

## 2023-01-01 VITALS
HEIGHT: 62 IN | HEART RATE: 72 BPM | DIASTOLIC BLOOD PRESSURE: 80 MMHG | SYSTOLIC BLOOD PRESSURE: 127 MMHG | OXYGEN SATURATION: 91 % | WEIGHT: 88.9 LBS | BODY MASS INDEX: 16.36 KG/M2

## 2023-01-01 VITALS — WEIGHT: 82 LBS | BODY MASS INDEX: 15.48 KG/M2 | HEIGHT: 61 IN

## 2023-01-01 VITALS
BODY MASS INDEX: 16.2 KG/M2 | DIASTOLIC BLOOD PRESSURE: 68 MMHG | HEIGHT: 62 IN | WEIGHT: 88 LBS | TEMPERATURE: 97.2 F | HEART RATE: 58 BPM | SYSTOLIC BLOOD PRESSURE: 96 MMHG | RESPIRATION RATE: 16 BRPM | OXYGEN SATURATION: 94 %

## 2023-01-01 VITALS
OXYGEN SATURATION: 99 % | HEART RATE: 82 BPM | TEMPERATURE: 98.2 F | SYSTOLIC BLOOD PRESSURE: 104 MMHG | DIASTOLIC BLOOD PRESSURE: 80 MMHG

## 2023-01-01 VITALS
BODY MASS INDEX: 15.86 KG/M2 | OXYGEN SATURATION: 94 % | HEART RATE: 67 BPM | HEIGHT: 62 IN | DIASTOLIC BLOOD PRESSURE: 83 MMHG | SYSTOLIC BLOOD PRESSURE: 144 MMHG | WEIGHT: 86.2 LBS

## 2023-01-01 VITALS — SYSTOLIC BLOOD PRESSURE: 112 MMHG | DIASTOLIC BLOOD PRESSURE: 68 MMHG

## 2023-01-01 DIAGNOSIS — U07.1 INFECTION DUE TO 2019 NOVEL CORONAVIRUS: Primary | ICD-10-CM

## 2023-01-01 DIAGNOSIS — I10 ESSENTIAL HYPERTENSION: ICD-10-CM

## 2023-01-01 DIAGNOSIS — Z53.9 DIAGNOSIS NOT YET DEFINED: Primary | ICD-10-CM

## 2023-01-01 DIAGNOSIS — N18.9 ACUTE ON CHRONIC RENAL INSUFFICIENCY: ICD-10-CM

## 2023-01-01 DIAGNOSIS — E83.42 HYPOMAGNESEMIA: Primary | ICD-10-CM

## 2023-01-01 DIAGNOSIS — I20.1 CORONARY ARTERY VASOSPASM (H): ICD-10-CM

## 2023-01-01 DIAGNOSIS — D50.9 IRON DEFICIENCY ANEMIA, UNSPECIFIED IRON DEFICIENCY ANEMIA TYPE: ICD-10-CM

## 2023-01-01 DIAGNOSIS — I71.40 ABDOMINAL AORTIC ANEURYSM (AAA) WITHOUT RUPTURE (H): Primary | ICD-10-CM

## 2023-01-01 DIAGNOSIS — Z94.0 STATUS POST KIDNEY TRANSPLANT: ICD-10-CM

## 2023-01-01 DIAGNOSIS — R09.A2 GLOBUS SYNDROME: ICD-10-CM

## 2023-01-01 DIAGNOSIS — E83.42 HYPOMAGNESEMIA: ICD-10-CM

## 2023-01-01 DIAGNOSIS — I73.9 PERIPHERAL ARTERY DISEASE (H): ICD-10-CM

## 2023-01-01 DIAGNOSIS — I50.9 CONGESTIVE HEART FAILURE, UNSPECIFIED HF CHRONICITY, UNSPECIFIED HEART FAILURE TYPE (H): ICD-10-CM

## 2023-01-01 DIAGNOSIS — Z94.0 KIDNEY TRANSPLANTED: Primary | ICD-10-CM

## 2023-01-01 DIAGNOSIS — R11.2 NAUSEA AND VOMITING, UNSPECIFIED VOMITING TYPE: ICD-10-CM

## 2023-01-01 DIAGNOSIS — R91.8 PULMONARY NODULES: Primary | ICD-10-CM

## 2023-01-01 DIAGNOSIS — R91.8 PULMONARY NODULES: ICD-10-CM

## 2023-01-01 DIAGNOSIS — I72.4 PSEUDOANEURYSM OF FEMORAL ARTERY (H): ICD-10-CM

## 2023-01-01 DIAGNOSIS — Z79.899 ENCOUNTER FOR LONG-TERM (CURRENT) USE OF HIGH-RISK MEDICATION: ICD-10-CM

## 2023-01-01 DIAGNOSIS — Z94.0 IMMUNOSUPPRESSIVE MANAGEMENT ENCOUNTER FOLLOWING KIDNEY TRANSPLANT: ICD-10-CM

## 2023-01-01 DIAGNOSIS — E44.1 MILD PROTEIN-CALORIE MALNUTRITION (H): ICD-10-CM

## 2023-01-01 DIAGNOSIS — T86.10 COMPLICATIONS, KIDNEY TRANSPLANT: ICD-10-CM

## 2023-01-01 DIAGNOSIS — I15.1 HTN, KIDNEY TRANSPLANT RELATED: ICD-10-CM

## 2023-01-01 DIAGNOSIS — E43 SEVERE PROTEIN-CALORIE MALNUTRITION (H): ICD-10-CM

## 2023-01-01 DIAGNOSIS — I71.23 ANEURYSM OF DESCENDING THORACIC AORTA WITHOUT RUPTURE (H): ICD-10-CM

## 2023-01-01 DIAGNOSIS — D84.9 IMMUNOSUPPRESSION (H): ICD-10-CM

## 2023-01-01 DIAGNOSIS — Z94.0 KIDNEY REPLACED BY TRANSPLANT: ICD-10-CM

## 2023-01-01 DIAGNOSIS — R11.0 NAUSEA: ICD-10-CM

## 2023-01-01 DIAGNOSIS — Z79.899 IMMUNOSUPPRESSIVE MANAGEMENT ENCOUNTER FOLLOWING KIDNEY TRANSPLANT: ICD-10-CM

## 2023-01-01 DIAGNOSIS — Z01.818 PREOP GENERAL PHYSICAL EXAM: Primary | ICD-10-CM

## 2023-01-01 DIAGNOSIS — I21.3 ST ELEVATION MYOCARDIAL INFARCTION (STEMI), UNSPECIFIED ARTERY (H): ICD-10-CM

## 2023-01-01 DIAGNOSIS — I73.9 PAD (PERIPHERAL ARTERY DISEASE) (H): Primary | ICD-10-CM

## 2023-01-01 DIAGNOSIS — Z94.0 KIDNEY TRANSPLANTED: ICD-10-CM

## 2023-01-01 DIAGNOSIS — R53.83 OTHER FATIGUE: ICD-10-CM

## 2023-01-01 DIAGNOSIS — D64.9 ANEMIA, UNSPECIFIED TYPE: ICD-10-CM

## 2023-01-01 DIAGNOSIS — J44.1 COPD EXACERBATION (H): ICD-10-CM

## 2023-01-01 DIAGNOSIS — M81.0 AGE-RELATED OSTEOPOROSIS WITHOUT CURRENT PATHOLOGICAL FRACTURE: ICD-10-CM

## 2023-01-01 DIAGNOSIS — E86.0 DEHYDRATION: ICD-10-CM

## 2023-01-01 DIAGNOSIS — R06.02 SHORTNESS OF BREATH: ICD-10-CM

## 2023-01-01 DIAGNOSIS — Z48.298 AFTERCARE FOLLOWING ORGAN TRANSPLANT: ICD-10-CM

## 2023-01-01 DIAGNOSIS — R19.7 NAUSEA VOMITING AND DIARRHEA: ICD-10-CM

## 2023-01-01 DIAGNOSIS — J44.1 COPD EXACERBATION (H): Primary | ICD-10-CM

## 2023-01-01 DIAGNOSIS — N18.32 STAGE 3B CHRONIC KIDNEY DISEASE (H): ICD-10-CM

## 2023-01-01 DIAGNOSIS — Z95.828 HISTORY OF REPAIR OF ANEURYSM OF ABDOMINAL AORTA USING ENDOVASCULAR STENT GRAFT: ICD-10-CM

## 2023-01-01 DIAGNOSIS — J45.20 MILD INTERMITTENT ASTHMA WITHOUT COMPLICATION: ICD-10-CM

## 2023-01-01 DIAGNOSIS — Z78.9 MEDICATION INTOLERANCE: ICD-10-CM

## 2023-01-01 DIAGNOSIS — R10.13 EPIGASTRIC PAIN: ICD-10-CM

## 2023-01-01 DIAGNOSIS — N17.9 AKI (ACUTE KIDNEY INJURY) (H): ICD-10-CM

## 2023-01-01 DIAGNOSIS — R53.1 GENERALIZED WEAKNESS: ICD-10-CM

## 2023-01-01 DIAGNOSIS — I82.4Y9 ACUTE DEEP VEIN THROMBOSIS (DVT) OF PROXIMAL VEIN OF LOWER EXTREMITY, UNSPECIFIED LATERALITY (H): ICD-10-CM

## 2023-01-01 DIAGNOSIS — Z94.0 KIDNEY REPLACED BY TRANSPLANT: Primary | ICD-10-CM

## 2023-01-01 DIAGNOSIS — D47.2 MGUS (MONOCLONAL GAMMOPATHY OF UNKNOWN SIGNIFICANCE): ICD-10-CM

## 2023-01-01 DIAGNOSIS — I71.40 ABDOMINAL AORTIC ANEURYSM (AAA) WITHOUT RUPTURE (H): ICD-10-CM

## 2023-01-01 DIAGNOSIS — I71.60 THORACOABDOMINAL AORTIC ANEURYSM (TAAA) WITHOUT RUPTURE, UNSPECIFIED PART (H): Primary | ICD-10-CM

## 2023-01-01 DIAGNOSIS — I31.39 PERICARDIAL EFFUSION: ICD-10-CM

## 2023-01-01 DIAGNOSIS — N17.9 ACUTE KIDNEY INJURY (H): ICD-10-CM

## 2023-01-01 DIAGNOSIS — I72.4 PSEUDOANEURYSM OF FEMORAL ARTERY (H): Primary | ICD-10-CM

## 2023-01-01 DIAGNOSIS — R19.7 DIARRHEA, UNSPECIFIED TYPE: Primary | ICD-10-CM

## 2023-01-01 DIAGNOSIS — Z71.89 COUNSELING AND COORDINATION OF CARE: ICD-10-CM

## 2023-01-01 DIAGNOSIS — S09.90XA CLOSED HEAD INJURY, INITIAL ENCOUNTER: ICD-10-CM

## 2023-01-01 DIAGNOSIS — C34.90 SQUAMOUS CELL CARCINOMA OF LUNG, UNSPECIFIED LATERALITY (H): ICD-10-CM

## 2023-01-01 DIAGNOSIS — U07.1 INFECTION DUE TO 2019 NOVEL CORONAVIRUS: ICD-10-CM

## 2023-01-01 DIAGNOSIS — D84.9 IMMUNOSUPPRESSED STATUS (H): ICD-10-CM

## 2023-01-01 DIAGNOSIS — Z94.0 HTN, KIDNEY TRANSPLANT RELATED: ICD-10-CM

## 2023-01-01 DIAGNOSIS — R19.7 DIARRHEA OF PRESUMED INFECTIOUS ORIGIN: ICD-10-CM

## 2023-01-01 DIAGNOSIS — M62.81 MUSCLE WEAKNESS (GENERALIZED): ICD-10-CM

## 2023-01-01 DIAGNOSIS — Z98.890 POSTOPERATIVE STATE: ICD-10-CM

## 2023-01-01 DIAGNOSIS — Z95.828 STATUS POST VASCULAR BYPASS: ICD-10-CM

## 2023-01-01 DIAGNOSIS — I50.9 CONGESTIVE HEART FAILURE, UNSPECIFIED HF CHRONICITY, UNSPECIFIED HEART FAILURE TYPE (H): Primary | ICD-10-CM

## 2023-01-01 DIAGNOSIS — N28.9 ACUTE ON CHRONIC RENAL INSUFFICIENCY: ICD-10-CM

## 2023-01-01 DIAGNOSIS — N18.30 STAGE 3 CHRONIC KIDNEY DISEASE, UNSPECIFIED WHETHER STAGE 3A OR 3B CKD (H): Primary | ICD-10-CM

## 2023-01-01 DIAGNOSIS — T14.8XXA HEMATOMA: ICD-10-CM

## 2023-01-01 DIAGNOSIS — R19.7 DIARRHEA, UNSPECIFIED TYPE: ICD-10-CM

## 2023-01-01 DIAGNOSIS — N18.30 STAGE 3 CHRONIC KIDNEY DISEASE, UNSPECIFIED WHETHER STAGE 3A OR 3B CKD (H): ICD-10-CM

## 2023-01-01 DIAGNOSIS — R79.89 ELEVATED D-DIMER: ICD-10-CM

## 2023-01-01 DIAGNOSIS — Z86.718 HISTORY OF DVT (DEEP VEIN THROMBOSIS): ICD-10-CM

## 2023-01-01 DIAGNOSIS — U07.1 COVID-19 VIRUS INFECTION: ICD-10-CM

## 2023-01-01 DIAGNOSIS — I71.40 ABDOMINAL AORTIC ANEURYSM (AAA) WITHOUT RUPTURE, UNSPECIFIED PART (H): Primary | ICD-10-CM

## 2023-01-01 DIAGNOSIS — C21.1 MALIGNANT NEOPLASM OF ANAL CANAL (H): ICD-10-CM

## 2023-01-01 DIAGNOSIS — I21.11 ST ELEVATION MYOCARDIAL INFARCTION INVOLVING RIGHT CORONARY ARTERY (H): ICD-10-CM

## 2023-01-01 DIAGNOSIS — C21.1 MALIGNANT NEOPLASM OF ANAL CANAL (H): Primary | ICD-10-CM

## 2023-01-01 DIAGNOSIS — R10.9 ABDOMINAL PAIN, UNSPECIFIED ABDOMINAL LOCATION: Primary | ICD-10-CM

## 2023-01-01 DIAGNOSIS — R10.13 ABDOMINAL PAIN, EPIGASTRIC: ICD-10-CM

## 2023-01-01 DIAGNOSIS — R73.09 ELEVATED GLUCOSE: ICD-10-CM

## 2023-01-01 DIAGNOSIS — I73.9 PERIPHERAL VASCULAR DISEASE, UNSPECIFIED (H): ICD-10-CM

## 2023-01-01 DIAGNOSIS — R53.1 WEAKNESS: ICD-10-CM

## 2023-01-01 DIAGNOSIS — I71.60 THORACOABDOMINAL AORTIC ANEURYSM (TAAA) WITHOUT RUPTURE, UNSPECIFIED PART (H): ICD-10-CM

## 2023-01-01 DIAGNOSIS — Z23 NEED FOR PROPHYLACTIC VACCINATION AND INOCULATION AGAINST INFLUENZA: Primary | ICD-10-CM

## 2023-01-01 DIAGNOSIS — R10.817 GENERALIZED ABDOMINAL TENDERNESS, REBOUND TENDERNESS PRESENCE NOT SPECIFIED: ICD-10-CM

## 2023-01-01 DIAGNOSIS — J45.20 MILD INTERMITTENT ASTHMA WITHOUT COMPLICATION: Primary | ICD-10-CM

## 2023-01-01 DIAGNOSIS — I72.4 PSEUDOANEURYSM OF RIGHT FEMORAL ARTERY (H): ICD-10-CM

## 2023-01-01 DIAGNOSIS — R79.0 LOW MAGNESIUM LEVEL: Primary | ICD-10-CM

## 2023-01-01 DIAGNOSIS — I73.9 PAD (PERIPHERAL ARTERY DISEASE) (H): ICD-10-CM

## 2023-01-01 DIAGNOSIS — I25.111 CORONARY ARTERY DISEASE INVOLVING NATIVE CORONARY ARTERY OF NATIVE HEART WITH ANGINA PECTORIS WITH DOCUMENTED SPASM (H): ICD-10-CM

## 2023-01-01 DIAGNOSIS — U07.1 LAB TEST POSITIVE FOR DETECTION OF COVID-19 VIRUS: Primary | ICD-10-CM

## 2023-01-01 DIAGNOSIS — S93.401A SPRAIN OF RIGHT ANKLE, UNSPECIFIED LIGAMENT, INITIAL ENCOUNTER: ICD-10-CM

## 2023-01-01 DIAGNOSIS — R06.02 SOB (SHORTNESS OF BREATH): ICD-10-CM

## 2023-01-01 DIAGNOSIS — I10 HYPERTENSION, BENIGN ESSENTIAL, GOAL BELOW 140/90: ICD-10-CM

## 2023-01-01 DIAGNOSIS — R11.2 NAUSEA VOMITING AND DIARRHEA: ICD-10-CM

## 2023-01-01 DIAGNOSIS — D84.9 IMMUNOSUPPRESSION (H): Primary | ICD-10-CM

## 2023-01-01 DIAGNOSIS — I72.4 PSEUDOANEURYSM OF RIGHT FEMORAL ARTERY (H): Primary | ICD-10-CM

## 2023-01-01 DIAGNOSIS — R79.0 LOW MAGNESIUM LEVEL: ICD-10-CM

## 2023-01-01 DIAGNOSIS — T81.49XA WOUND INFECTION AFTER SURGERY: ICD-10-CM

## 2023-01-01 LAB
ABO/RH(D): NORMAL
ADV 40+41 DNA STL QL NAA+NON-PROBE: NEGATIVE
ALBUMIN SERPL BCG-MCNC: 2.4 G/DL (ref 3.5–5.2)
ALBUMIN SERPL BCG-MCNC: 2.5 G/DL (ref 3.5–5.2)
ALBUMIN SERPL BCG-MCNC: 2.6 G/DL (ref 3.5–5.2)
ALBUMIN SERPL BCG-MCNC: 2.7 G/DL (ref 3.5–5.2)
ALBUMIN SERPL BCG-MCNC: 2.8 G/DL (ref 3.5–5.2)
ALBUMIN SERPL BCG-MCNC: 3.1 G/DL (ref 3.5–5.2)
ALBUMIN SERPL BCG-MCNC: 3.2 G/DL (ref 3.5–5.2)
ALBUMIN SERPL BCG-MCNC: 3.2 G/DL (ref 3.5–5.2)
ALBUMIN SERPL BCG-MCNC: 3.3 G/DL (ref 3.5–5.2)
ALBUMIN SERPL BCG-MCNC: 3.4 G/DL (ref 3.5–5.2)
ALBUMIN SERPL BCG-MCNC: 3.8 G/DL (ref 3.5–5.2)
ALBUMIN SERPL BCG-MCNC: 3.9 G/DL (ref 3.5–5.2)
ALBUMIN SERPL BCG-MCNC: 3.9 G/DL (ref 3.5–5.2)
ALBUMIN SERPL BCG-MCNC: 4 G/DL (ref 3.5–5.2)
ALBUMIN SERPL BCG-MCNC: 4 G/DL (ref 3.5–5.2)
ALBUMIN SERPL BCG-MCNC: 4.1 G/DL (ref 3.5–5.2)
ALBUMIN SERPL ELPH-MCNC: 3.5 G/DL (ref 3.7–5.1)
ALBUMIN UR-MCNC: 100 MG/DL
ALBUMIN UR-MCNC: 20 MG/DL
ALBUMIN UR-MCNC: 50 MG/DL
ALP SERPL-CCNC: 106 U/L (ref 35–104)
ALP SERPL-CCNC: 108 U/L (ref 35–104)
ALP SERPL-CCNC: 108 U/L (ref 35–104)
ALP SERPL-CCNC: 113 U/L (ref 35–104)
ALP SERPL-CCNC: 116 U/L (ref 35–104)
ALP SERPL-CCNC: 125 U/L (ref 35–104)
ALP SERPL-CCNC: 128 U/L (ref 35–104)
ALP SERPL-CCNC: 134 U/L (ref 35–104)
ALP SERPL-CCNC: 163 U/L (ref 35–104)
ALP SERPL-CCNC: 165 U/L (ref 40–150)
ALP SERPL-CCNC: 202 U/L (ref 35–104)
ALP SERPL-CCNC: 97 U/L (ref 35–104)
ALPHA1 GLOB SERPL ELPH-MCNC: 0.6 G/DL (ref 0.2–0.4)
ALPHA2 GLOB SERPL ELPH-MCNC: 1 G/DL (ref 0.5–0.9)
ALT SERPL W P-5'-P-CCNC: 10 U/L (ref 0–50)
ALT SERPL W P-5'-P-CCNC: 11 U/L (ref 0–50)
ALT SERPL W P-5'-P-CCNC: 12 U/L (ref 10–35)
ALT SERPL W P-5'-P-CCNC: 14 U/L (ref 0–50)
ALT SERPL W P-5'-P-CCNC: 15 U/L (ref 0–50)
ALT SERPL W P-5'-P-CCNC: 15 U/L (ref 10–35)
ALT SERPL W P-5'-P-CCNC: 17 U/L (ref 0–50)
ALT SERPL W P-5'-P-CCNC: 17 U/L (ref 10–35)
ALT SERPL W P-5'-P-CCNC: 20 U/L (ref 0–50)
ALT SERPL W P-5'-P-CCNC: 38 U/L (ref 10–35)
ALT SERPL W P-5'-P-CCNC: 8 U/L (ref 0–50)
ALT SERPL W P-5'-P-CCNC: <5 U/L (ref 0–50)
ANION GAP SERPL CALCULATED.3IONS-SCNC: 10 MMOL/L (ref 7–15)
ANION GAP SERPL CALCULATED.3IONS-SCNC: 11 MMOL/L (ref 7–15)
ANION GAP SERPL CALCULATED.3IONS-SCNC: 12 MMOL/L (ref 7–15)
ANION GAP SERPL CALCULATED.3IONS-SCNC: 13 MMOL/L (ref 7–15)
ANION GAP SERPL CALCULATED.3IONS-SCNC: 14 MMOL/L (ref 7–15)
ANION GAP SERPL CALCULATED.3IONS-SCNC: 15 MMOL/L (ref 7–15)
ANION GAP SERPL CALCULATED.3IONS-SCNC: 9 MMOL/L (ref 7–15)
ANION GAP SERPL CALCULATED.3IONS-SCNC: 9 MMOL/L (ref 7–15)
ANTIBODY SCREEN: NEGATIVE
APPEARANCE UR: CLEAR
APTT PPP: 25 SECONDS (ref 22–38)
APTT PPP: 29 SECONDS (ref 22–38)
AST SERPL W P-5'-P-CCNC: 14 U/L (ref 0–45)
AST SERPL W P-5'-P-CCNC: 16 U/L (ref 0–45)
AST SERPL W P-5'-P-CCNC: 18 U/L (ref 0–45)
AST SERPL W P-5'-P-CCNC: 18 U/L (ref 10–35)
AST SERPL W P-5'-P-CCNC: 19 U/L (ref 10–35)
AST SERPL W P-5'-P-CCNC: 22 U/L (ref 0–45)
AST SERPL W P-5'-P-CCNC: 23 U/L (ref 10–35)
AST SERPL W P-5'-P-CCNC: 24 U/L (ref 0–45)
AST SERPL W P-5'-P-CCNC: 30 U/L (ref 0–45)
AST SERPL W P-5'-P-CCNC: 43 U/L (ref 10–35)
ASTRO TYP 1-8 RNA STL QL NAA+NON-PROBE: NEGATIVE
ATRIAL RATE - MUSE: 67 BPM
ATRIAL RATE - MUSE: 67 BPM
ATRIAL RATE - MUSE: 73 BPM
ATRIAL RATE - MUSE: 83 BPM
B-GLOBULIN SERPL ELPH-MCNC: 0.7 G/DL (ref 0.6–1)
BACTERIA BLD CULT: NO GROWTH
BASE EXCESS BLDA CALC-SCNC: -0.9 MMOL/L (ref -9.6–2)
BASE EXCESS BLDA CALC-SCNC: -2.4 MMOL/L (ref -9.6–2)
BASE EXCESS BLDA CALC-SCNC: -4.8 MMOL/L (ref -9.6–2)
BASOPHILS # BLD AUTO: 0 10E3/UL (ref 0–0.2)
BASOPHILS # BLD AUTO: 0.1 10E3/UL (ref 0–0.2)
BASOPHILS # BLD AUTO: 0.1 10E3/UL (ref 0–0.2)
BASOPHILS NFR BLD AUTO: 0 %
BASOPHILS NFR BLD AUTO: 1 %
BILIRUB DIRECT SERPL-MCNC: <0.2 MG/DL (ref 0–0.3)
BILIRUB SERPL-MCNC: 0.2 MG/DL
BILIRUB SERPL-MCNC: 0.3 MG/DL
BILIRUB SERPL-MCNC: 0.4 MG/DL
BILIRUB SERPL-MCNC: <0.2 MG/DL
BILIRUB UR QL STRIP: NEGATIVE
BKV DNA # SPEC NAA+PROBE: NOT DETECTED COPIES/ML
BLD PROD TYP BPU: NORMAL
BLOOD COMPONENT TYPE: NORMAL
BUN SERPL-MCNC: 29.2 MG/DL (ref 8–23)
BUN SERPL-MCNC: 29.8 MG/DL (ref 8–23)
BUN SERPL-MCNC: 31 MG/DL (ref 8–23)
BUN SERPL-MCNC: 33.3 MG/DL (ref 8–23)
BUN SERPL-MCNC: 34.4 MG/DL (ref 8–23)
BUN SERPL-MCNC: 34.5 MG/DL (ref 8–23)
BUN SERPL-MCNC: 36.9 MG/DL (ref 8–23)
BUN SERPL-MCNC: 37.5 MG/DL (ref 8–23)
BUN SERPL-MCNC: 38.8 MG/DL (ref 8–23)
BUN SERPL-MCNC: 38.9 MG/DL (ref 8–23)
BUN SERPL-MCNC: 38.9 MG/DL (ref 8–23)
BUN SERPL-MCNC: 39.5 MG/DL (ref 8–23)
BUN SERPL-MCNC: 39.7 MG/DL (ref 8–23)
BUN SERPL-MCNC: 40.1 MG/DL (ref 8–23)
BUN SERPL-MCNC: 45 MG/DL (ref 8–23)
BUN SERPL-MCNC: 45.5 MG/DL (ref 8–23)
BUN SERPL-MCNC: 45.9 MG/DL (ref 8–23)
BUN SERPL-MCNC: 46.5 MG/DL (ref 8–23)
BUN SERPL-MCNC: 47.7 MG/DL (ref 8–23)
BUN SERPL-MCNC: 47.9 MG/DL (ref 8–23)
BUN SERPL-MCNC: 48.1 MG/DL (ref 8–23)
BUN SERPL-MCNC: 48.3 MG/DL (ref 8–23)
BUN SERPL-MCNC: 48.4 MG/DL (ref 8–23)
BUN SERPL-MCNC: 52.9 MG/DL (ref 8–23)
BUN SERPL-MCNC: 54.4 MG/DL (ref 8–23)
BUN SERPL-MCNC: 56.9 MG/DL (ref 8–23)
BUN SERPL-MCNC: 57.4 MG/DL (ref 8–23)
BUN SERPL-MCNC: 60.1 MG/DL (ref 8–23)
BUN SERPL-MCNC: 60.6 MG/DL (ref 8–23)
BUN SERPL-MCNC: 69.7 MG/DL (ref 8–23)
BUN SERPL-MCNC: 72.4 MG/DL (ref 8–23)
BUN SERPL-MCNC: 76.6 MG/DL (ref 8–23)
C CAYETANENSIS DNA STL QL NAA+NON-PROBE: NEGATIVE
C COLI+JEJUNI+LARI FUSA STL QL NAA+PROBE: NOT DETECTED
C DIFF GDH STL QL IA: NEGATIVE
C DIFF TOX A+B STL QL IA: NEGATIVE
C DIFF TOX B STL QL: POSITIVE
C PARVUM AG STL QL IA: NEGATIVE
CA-I BLD-MCNC: 4.4 MG/DL (ref 4.4–5.2)
CA-I BLD-MCNC: 4.8 MG/DL (ref 4.4–5.2)
CA-I BLD-MCNC: 5 MG/DL (ref 4.4–5.2)
CALCIUM SERPL-MCNC: 10.1 MG/DL (ref 8.8–10.2)
CALCIUM SERPL-MCNC: 10.4 MG/DL (ref 8.8–10.2)
CALCIUM SERPL-MCNC: 10.5 MG/DL (ref 8.8–10.2)
CALCIUM SERPL-MCNC: 8.2 MG/DL (ref 8.8–10.2)
CALCIUM SERPL-MCNC: 8.3 MG/DL (ref 8.8–10.2)
CALCIUM SERPL-MCNC: 8.5 MG/DL (ref 8.8–10.2)
CALCIUM SERPL-MCNC: 8.5 MG/DL (ref 8.8–10.2)
CALCIUM SERPL-MCNC: 8.6 MG/DL (ref 8.8–10.2)
CALCIUM SERPL-MCNC: 8.6 MG/DL (ref 8.8–10.2)
CALCIUM SERPL-MCNC: 8.7 MG/DL (ref 8.8–10.2)
CALCIUM SERPL-MCNC: 8.7 MG/DL (ref 8.8–10.2)
CALCIUM SERPL-MCNC: 8.8 MG/DL (ref 8.8–10.2)
CALCIUM SERPL-MCNC: 8.8 MG/DL (ref 8.8–10.2)
CALCIUM SERPL-MCNC: 8.9 MG/DL (ref 8.8–10.2)
CALCIUM SERPL-MCNC: 8.9 MG/DL (ref 8.8–10.2)
CALCIUM SERPL-MCNC: 9.1 MG/DL (ref 8.8–10.2)
CALCIUM SERPL-MCNC: 9.1 MG/DL (ref 8.8–10.2)
CALCIUM SERPL-MCNC: 9.3 MG/DL (ref 8.8–10.2)
CALCIUM SERPL-MCNC: 9.4 MG/DL (ref 8.8–10.2)
CALCIUM SERPL-MCNC: 9.6 MG/DL (ref 8.8–10.2)
CALCIUM SERPL-MCNC: 9.6 MG/DL (ref 8.8–10.2)
CALCIUM SERPL-MCNC: 9.7 MG/DL (ref 8.8–10.2)
CALCIUM SERPL-MCNC: 9.8 MG/DL (ref 8.8–10.2)
CALCIUM SERPL-MCNC: 9.9 MG/DL (ref 8.8–10.2)
CAMPYLOBACTER DNA SPEC NAA+PROBE: NEGATIVE
CHLORIDE SERPL-SCNC: 100 MMOL/L (ref 98–107)
CHLORIDE SERPL-SCNC: 101 MMOL/L (ref 98–107)
CHLORIDE SERPL-SCNC: 102 MMOL/L (ref 98–107)
CHLORIDE SERPL-SCNC: 103 MMOL/L (ref 98–107)
CHLORIDE SERPL-SCNC: 104 MMOL/L (ref 98–107)
CHLORIDE SERPL-SCNC: 104 MMOL/L (ref 98–107)
CHLORIDE SERPL-SCNC: 105 MMOL/L (ref 98–107)
CHLORIDE SERPL-SCNC: 107 MMOL/L (ref 98–107)
CHLORIDE SERPL-SCNC: 95 MMOL/L (ref 98–107)
CHLORIDE SERPL-SCNC: 95 MMOL/L (ref 98–107)
CHLORIDE SERPL-SCNC: 97 MMOL/L (ref 98–107)
CHLORIDE SERPL-SCNC: 97 MMOL/L (ref 98–107)
CHLORIDE SERPL-SCNC: 98 MMOL/L (ref 98–107)
CHLORIDE SERPL-SCNC: 98 MMOL/L (ref 98–107)
CHLORIDE SERPL-SCNC: 99 MMOL/L (ref 98–107)
CHOLEST SERPL-MCNC: 128 MG/DL
CMV DNA SPEC NAA+PROBE-ACNC: <35 IU/ML
CMV DNA SPEC NAA+PROBE-LOG#: <1.5 {LOG_COPIES}/ML
CODING SYSTEM: NORMAL
COLOR UR AUTO: ABNORMAL
CORTICOSTER 30M P 250 UG ACTH SERPL-SCNC: 6.9 UG/DL
CORTIS SERPL-MCNC: 12.4 UG/DL
CREAT BLD-MCNC: 2.2 MG/DL (ref 0.5–1)
CREAT SERPL-MCNC: 1.72 MG/DL (ref 0.51–0.95)
CREAT SERPL-MCNC: 1.74 MG/DL (ref 0.51–0.95)
CREAT SERPL-MCNC: 1.74 MG/DL (ref 0.51–0.95)
CREAT SERPL-MCNC: 1.79 MG/DL (ref 0.51–0.95)
CREAT SERPL-MCNC: 1.8 MG/DL (ref 0.51–0.95)
CREAT SERPL-MCNC: 1.81 MG/DL (ref 0.51–0.95)
CREAT SERPL-MCNC: 1.87 MG/DL (ref 0.51–0.95)
CREAT SERPL-MCNC: 1.89 MG/DL (ref 0.51–0.95)
CREAT SERPL-MCNC: 1.91 MG/DL (ref 0.51–0.95)
CREAT SERPL-MCNC: 2 MG/DL (ref 0.51–0.95)
CREAT SERPL-MCNC: 2.07 MG/DL (ref 0.51–0.95)
CREAT SERPL-MCNC: 2.09 MG/DL (ref 0.51–0.95)
CREAT SERPL-MCNC: 2.09 MG/DL (ref 0.51–0.95)
CREAT SERPL-MCNC: 2.1 MG/DL (ref 0.51–0.95)
CREAT SERPL-MCNC: 2.1 MG/DL (ref 0.51–0.95)
CREAT SERPL-MCNC: 2.12 MG/DL (ref 0.51–0.95)
CREAT SERPL-MCNC: 2.12 MG/DL (ref 0.51–0.95)
CREAT SERPL-MCNC: 2.18 MG/DL (ref 0.51–0.95)
CREAT SERPL-MCNC: 2.22 MG/DL (ref 0.51–0.95)
CREAT SERPL-MCNC: 2.22 MG/DL (ref 0.51–0.95)
CREAT SERPL-MCNC: 2.25 MG/DL (ref 0.51–0.95)
CREAT SERPL-MCNC: 2.34 MG/DL (ref 0.51–0.95)
CREAT SERPL-MCNC: 2.41 MG/DL (ref 0.51–0.95)
CREAT SERPL-MCNC: 2.41 MG/DL (ref 0.51–0.95)
CREAT SERPL-MCNC: 2.42 MG/DL (ref 0.51–0.95)
CREAT SERPL-MCNC: 2.64 MG/DL (ref 0.51–0.95)
CREAT SERPL-MCNC: 2.71 MG/DL (ref 0.51–0.95)
CREAT SERPL-MCNC: 2.78 MG/DL (ref 0.51–0.95)
CREAT SERPL-MCNC: 2.85 MG/DL (ref 0.51–0.95)
CREAT SERPL-MCNC: 2.94 MG/DL (ref 0.51–0.95)
CROSSMATCH: NORMAL
CRP SERPL-MCNC: 22.3 MG/L
CRP SERPL-MCNC: 26.2 MG/L
CRP SERPL-MCNC: 34.9 MG/L
CRP SERPL-MCNC: 46.7 MG/L
CRP SERPL-MCNC: 62.2 MG/L
CRP SERPL-MCNC: 72.65 MG/L
CRP SERPL-MCNC: 92.5 MG/L
CRYPTOSP DNA STL QL NAA+NON-PROBE: NEGATIVE
CYSTATIN C (ROCHE): 3.4 MG/L (ref 0.6–1)
D DIMER PPP FEU-MCNC: 11.73 UG/ML FEU (ref 0–0.5)
D DIMER PPP FEU-MCNC: 11.97 UG/ML FEU (ref 0–0.5)
D DIMER PPP FEU-MCNC: 13.38 UG/ML FEU (ref 0–0.5)
D DIMER PPP FEU-MCNC: 14.49 UG/ML FEU (ref 0–0.5)
D DIMER PPP FEU-MCNC: 16.08 UG/ML FEU (ref 0–0.5)
D DIMER PPP FEU-MCNC: 17.3 UG/ML FEU (ref 0–0.5)
DEPRECATED HCO3 PLAS-SCNC: 19 MMOL/L (ref 22–29)
DEPRECATED HCO3 PLAS-SCNC: 19 MMOL/L (ref 22–29)
DEPRECATED HCO3 PLAS-SCNC: 20 MMOL/L (ref 22–29)
DEPRECATED HCO3 PLAS-SCNC: 20 MMOL/L (ref 22–29)
DEPRECATED HCO3 PLAS-SCNC: 21 MMOL/L (ref 22–29)
DEPRECATED HCO3 PLAS-SCNC: 22 MMOL/L (ref 22–29)
DEPRECATED HCO3 PLAS-SCNC: 23 MMOL/L (ref 22–29)
DEPRECATED HCO3 PLAS-SCNC: 24 MMOL/L (ref 22–29)
DEPRECATED HCO3 PLAS-SCNC: 25 MMOL/L (ref 22–29)
DEPRECATED HCO3 PLAS-SCNC: 26 MMOL/L (ref 22–29)
DEPRECATED HCO3 PLAS-SCNC: 27 MMOL/L (ref 22–29)
DEPRECATED HCO3 PLAS-SCNC: 28 MMOL/L (ref 22–29)
DEPRECATED HCO3 PLAS-SCNC: 28 MMOL/L (ref 22–29)
DEPRECATED HCO3 PLAS-SCNC: 29 MMOL/L (ref 22–29)
DEPRECATED HCO3 PLAS-SCNC: 31 MMOL/L (ref 22–29)
DIASTOLIC BLOOD PRESSURE - MUSE: NORMAL MMHG
DLCOUNC-%PRED-PRE: 36 %
DLCOUNC-PRE: 6.69 ML/MIN/MMHG
DLCOUNC-PRED: 18.18 ML/MIN/MMHG
DONOR IDENTIFICATION: NORMAL
DSA COMMENTS: NORMAL
DSA PRESENT: NO
DSA TEST METHOD: NORMAL
E COLI O157 DNA STL QL NAA+NON-PROBE: NORMAL
E HISTOLYT DNA STL QL NAA+NON-PROBE: NEGATIVE
EAEC ASTA GENE ISLT QL NAA+PROBE: NEGATIVE
EBV DNA COPIES/ML, INSTRUMENT: 2934 COPIES/ML
EBV DNA SPEC NAA+PROBE-LOG#: 3.5 {LOG_COPIES}/ML
EC STX1 GENE STL QL NAA+PROBE: NOT DETECTED
EC STX1+STX2 GENES STL QL NAA+NON-PROBE: NEGATIVE
EC STX2 GENE STL QL NAA+PROBE: NOT DETECTED
EGFRCR SERPLBLD CKD-EPI 2021: 17 ML/MIN/1.73M2
EGFRCR SERPLBLD CKD-EPI 2021: 17 ML/MIN/1.73M2
EGFRCR SERPLBLD CKD-EPI 2021: 18 ML/MIN/1.73M2
EGFRCR SERPLBLD CKD-EPI 2021: 18 ML/MIN/1.73M2
EGFRCR SERPLBLD CKD-EPI 2021: 19 ML/MIN/1.73M2
EGFRCR SERPLBLD CKD-EPI 2021: 22 ML/MIN/1.73M2
EGFRCR SERPLBLD CKD-EPI 2021: 23 ML/MIN/1.73M2
EGFRCR SERPLBLD CKD-EPI 2021: 24 ML/MIN/1.73M2
EGFRCR SERPLBLD CKD-EPI 2021: 25 ML/MIN/1.73M2
EGFRCR SERPLBLD CKD-EPI 2021: 28 ML/MIN/1.73M2
EGFRCR SERPLBLD CKD-EPI 2021: 28 ML/MIN/1.73M2
EGFRCR SERPLBLD CKD-EPI 2021: 30 ML/MIN/1.73M2
EOSINOPHIL # BLD AUTO: 0 10E3/UL (ref 0–0.7)
EOSINOPHIL # BLD AUTO: 0.1 10E3/UL (ref 0–0.7)
EOSINOPHIL # BLD AUTO: 0.2 10E3/UL (ref 0–0.7)
EOSINOPHIL NFR BLD AUTO: 0 %
EOSINOPHIL NFR BLD AUTO: 1 %
EOSINOPHIL NFR BLD AUTO: 2 %
EPEC EAE GENE STL QL NAA+NON-PROBE: NEGATIVE
ERV-%PRED-PRE: 82 %
ERV-PRE: 0.93 L
ERV-PRED: 1.13 L
ERYTHROCYTE [DISTWIDTH] IN BLOOD BY AUTOMATED COUNT: 14.6 % (ref 10–15)
ERYTHROCYTE [DISTWIDTH] IN BLOOD BY AUTOMATED COUNT: 14.7 % (ref 10–15)
ERYTHROCYTE [DISTWIDTH] IN BLOOD BY AUTOMATED COUNT: 14.7 % (ref 10–15)
ERYTHROCYTE [DISTWIDTH] IN BLOOD BY AUTOMATED COUNT: 14.8 % (ref 10–15)
ERYTHROCYTE [DISTWIDTH] IN BLOOD BY AUTOMATED COUNT: 14.8 % (ref 10–15)
ERYTHROCYTE [DISTWIDTH] IN BLOOD BY AUTOMATED COUNT: 15 % (ref 10–15)
ERYTHROCYTE [DISTWIDTH] IN BLOOD BY AUTOMATED COUNT: 15.8 % (ref 10–15)
ERYTHROCYTE [DISTWIDTH] IN BLOOD BY AUTOMATED COUNT: 16.2 % (ref 10–15)
ERYTHROCYTE [DISTWIDTH] IN BLOOD BY AUTOMATED COUNT: 16.3 % (ref 10–15)
ERYTHROCYTE [DISTWIDTH] IN BLOOD BY AUTOMATED COUNT: 16.4 % (ref 10–15)
ERYTHROCYTE [DISTWIDTH] IN BLOOD BY AUTOMATED COUNT: 17 % (ref 10–15)
ERYTHROCYTE [DISTWIDTH] IN BLOOD BY AUTOMATED COUNT: 17.1 % (ref 10–15)
ERYTHROCYTE [DISTWIDTH] IN BLOOD BY AUTOMATED COUNT: 17.2 % (ref 10–15)
ERYTHROCYTE [DISTWIDTH] IN BLOOD BY AUTOMATED COUNT: 17.2 % (ref 10–15)
ERYTHROCYTE [DISTWIDTH] IN BLOOD BY AUTOMATED COUNT: 17.4 % (ref 10–15)
ERYTHROCYTE [DISTWIDTH] IN BLOOD BY AUTOMATED COUNT: 17.5 % (ref 10–15)
ERYTHROCYTE [DISTWIDTH] IN BLOOD BY AUTOMATED COUNT: 17.5 % (ref 10–15)
ERYTHROCYTE [DISTWIDTH] IN BLOOD BY AUTOMATED COUNT: 17.6 % (ref 10–15)
ERYTHROCYTE [DISTWIDTH] IN BLOOD BY AUTOMATED COUNT: 17.8 % (ref 10–15)
ERYTHROCYTE [DISTWIDTH] IN BLOOD BY AUTOMATED COUNT: 18.1 % (ref 10–15)
ERYTHROCYTE [DISTWIDTH] IN BLOOD BY AUTOMATED COUNT: 20.1 % (ref 10–15)
ETEC LTA+ST1A+ST1B TOX ST NAA+NON-PROBE: NEGATIVE
EXPTIME-PRE: 8.52 SEC
FEF2575-%PRED-PRE: 38 %
FEF2575-PRE: 0.65 L/SEC
FEF2575-PRED: 1.7 L/SEC
FEFMAX-%PRED-PRE: 51 %
FEFMAX-PRE: 2.73 L/SEC
FEFMAX-PRED: 5.34 L/SEC
FERRITIN SERPL-MCNC: 86 NG/ML (ref 11–328)
FEV1-%PRED-PRE: 67 %
FEV1-PRE: 1.31 L
FEV1FEV6-PRE: 61 %
FEV1FEV6-PRED: 79 %
FEV1FVC-PRE: 60 %
FEV1FVC-PRED: 79 %
FEV1SVC-PRE: 59 %
FEV1SVC-PRED: 69 %
FIFMAX-PRE: 2.07 L/SEC
FLUAV RNA SPEC QL NAA+PROBE: NEGATIVE
FLUBV RNA RESP QL NAA+PROBE: NEGATIVE
FRCPLETH-%PRED-PRE: 140 %
FRCPLETH-PRE: 3.64 L
FRCPLETH-PRED: 2.6 L
FVC-%PRED-PRE: 89 %
FVC-PRE: 2.18 L
FVC-PRED: 2.44 L
G LAMBLIA AG STL QL IA: NEGATIVE
G LAMBLIA DNA STL QL NAA+NON-PROBE: NEGATIVE
GAMMA GLOB SERPL ELPH-MCNC: 0.8 G/DL (ref 0.7–1.6)
GFR SERPL CREATININE-BSD FRML MDRD: 14 ML/MIN/1.73M2
GFR SERPL CREATININE-BSD FRML MDRD: 21 ML/MIN/1.73M2
GFR SERPL CREATININE-BSD FRML MDRD: 23 ML/MIN/1.73M2
GFR SERPL CREATININE-BSD FRML MDRD: 24 ML/MIN/1.73M2
GFR SERPL CREATININE-BSD FRML MDRD: 24 ML/MIN/1.73M2
GFR SERPL CREATININE-BSD FRML MDRD: 25 ML/MIN/1.73M2
GFR SERPL CREATININE-BSD FRML MDRD: 25 ML/MIN/1.73M2
GFR SERPL CREATININE-BSD FRML MDRD: 26 ML/MIN/1.73M2
GFR SERPL CREATININE-BSD FRML MDRD: 28 ML/MIN/1.73M2
GFR SERPL CREATININE-BSD FRML MDRD: 30 ML/MIN/1.73M2
GFR SERPL CREATININE-BSD FRML MDRD: 30 ML/MIN/1.73M2
GFR SERPL CREATININE-BSD FRML MDRD: 31 ML/MIN/1.73M2
GLUCOSE BLD-MCNC: 109 MG/DL (ref 70–99)
GLUCOSE BLD-MCNC: 120 MG/DL (ref 70–99)
GLUCOSE BLD-MCNC: 120 MG/DL (ref 70–99)
GLUCOSE BLDC GLUCOMTR-MCNC: 88 MG/DL (ref 70–99)
GLUCOSE SERPL-MCNC: 104 MG/DL (ref 70–99)
GLUCOSE SERPL-MCNC: 104 MG/DL (ref 70–99)
GLUCOSE SERPL-MCNC: 105 MG/DL (ref 70–99)
GLUCOSE SERPL-MCNC: 106 MG/DL (ref 70–99)
GLUCOSE SERPL-MCNC: 107 MG/DL (ref 70–99)
GLUCOSE SERPL-MCNC: 113 MG/DL (ref 70–99)
GLUCOSE SERPL-MCNC: 113 MG/DL (ref 70–99)
GLUCOSE SERPL-MCNC: 114 MG/DL (ref 70–99)
GLUCOSE SERPL-MCNC: 115 MG/DL (ref 70–99)
GLUCOSE SERPL-MCNC: 119 MG/DL (ref 70–99)
GLUCOSE SERPL-MCNC: 120 MG/DL (ref 70–99)
GLUCOSE SERPL-MCNC: 126 MG/DL (ref 70–99)
GLUCOSE SERPL-MCNC: 138 MG/DL (ref 70–99)
GLUCOSE SERPL-MCNC: 164 MG/DL (ref 70–99)
GLUCOSE SERPL-MCNC: 170 MG/DL (ref 70–99)
GLUCOSE SERPL-MCNC: 209 MG/DL (ref 70–99)
GLUCOSE SERPL-MCNC: 245 MG/DL (ref 70–99)
GLUCOSE SERPL-MCNC: 81 MG/DL (ref 70–99)
GLUCOSE SERPL-MCNC: 83 MG/DL (ref 70–99)
GLUCOSE SERPL-MCNC: 86 MG/DL (ref 70–99)
GLUCOSE SERPL-MCNC: 88 MG/DL (ref 70–99)
GLUCOSE SERPL-MCNC: 88 MG/DL (ref 70–99)
GLUCOSE SERPL-MCNC: 91 MG/DL (ref 70–99)
GLUCOSE SERPL-MCNC: 92 MG/DL (ref 70–99)
GLUCOSE SERPL-MCNC: 93 MG/DL (ref 70–99)
GLUCOSE SERPL-MCNC: 93 MG/DL (ref 70–99)
GLUCOSE SERPL-MCNC: 94 MG/DL (ref 70–99)
GLUCOSE SERPL-MCNC: 95 MG/DL (ref 70–99)
GLUCOSE SERPL-MCNC: 97 MG/DL (ref 70–99)
GLUCOSE SERPL-MCNC: 97 MG/DL (ref 70–99)
GLUCOSE UR STRIP-MCNC: NEGATIVE MG/DL
HBA1C MFR BLD: 5 %
HCO3 BLDA-SCNC: 20 MMOL/L (ref 21–28)
HCO3 BLDA-SCNC: 24 MMOL/L (ref 21–28)
HCO3 BLDA-SCNC: 24 MMOL/L (ref 21–28)
HCO3 SERPL-SCNC: 21 MMOL/L (ref 22–29)
HCT VFR BLD AUTO: 26.4 % (ref 35–47)
HCT VFR BLD AUTO: 27.1 % (ref 35–47)
HCT VFR BLD AUTO: 27.4 % (ref 35–47)
HCT VFR BLD AUTO: 27.4 % (ref 35–47)
HCT VFR BLD AUTO: 27.6 % (ref 35–47)
HCT VFR BLD AUTO: 28.1 % (ref 35–47)
HCT VFR BLD AUTO: 28.7 % (ref 35–47)
HCT VFR BLD AUTO: 29.2 % (ref 35–47)
HCT VFR BLD AUTO: 30 % (ref 35–47)
HCT VFR BLD AUTO: 30.1 % (ref 35–47)
HCT VFR BLD AUTO: 30.4 % (ref 35–47)
HCT VFR BLD AUTO: 31.5 % (ref 35–47)
HCT VFR BLD AUTO: 31.7 % (ref 35–47)
HCT VFR BLD AUTO: 32.8 % (ref 35–47)
HCT VFR BLD AUTO: 32.8 % (ref 35–47)
HCT VFR BLD AUTO: 33.2 % (ref 35–47)
HCT VFR BLD AUTO: 34 % (ref 35–47)
HCT VFR BLD AUTO: 34.3 % (ref 35–47)
HCT VFR BLD AUTO: 34.4 % (ref 35–47)
HCT VFR BLD AUTO: 35.5 % (ref 35–47)
HCT VFR BLD AUTO: 35.6 % (ref 35–47)
HCT VFR BLD AUTO: 35.7 % (ref 35–47)
HCT VFR BLD AUTO: 37.9 % (ref 35–47)
HCT VFR BLD AUTO: 38.8 % (ref 35–47)
HDLC SERPL-MCNC: 53 MG/DL
HGB BLD-MCNC: 10 G/DL (ref 11.7–15.7)
HGB BLD-MCNC: 10.1 G/DL (ref 11.7–15.7)
HGB BLD-MCNC: 10.2 G/DL (ref 11.7–15.7)
HGB BLD-MCNC: 10.3 G/DL (ref 11.7–15.7)
HGB BLD-MCNC: 10.6 G/DL (ref 11.7–15.7)
HGB BLD-MCNC: 10.8 G/DL (ref 11.7–15.7)
HGB BLD-MCNC: 10.9 G/DL (ref 11.7–15.7)
HGB BLD-MCNC: 11 G/DL (ref 11.7–15.7)
HGB BLD-MCNC: 11 G/DL (ref 11.7–15.7)
HGB BLD-MCNC: 11.2 G/DL (ref 11.7–15.7)
HGB BLD-MCNC: 11.2 G/DL (ref 11.7–15.7)
HGB BLD-MCNC: 11.9 G/DL (ref 11.7–15.7)
HGB BLD-MCNC: 12 G/DL (ref 11.7–15.7)
HGB BLD-MCNC: 6.9 G/DL (ref 11.7–15.7)
HGB BLD-MCNC: 7.4 G/DL (ref 11.7–15.7)
HGB BLD-MCNC: 8.2 G/DL (ref 11.7–15.7)
HGB BLD-MCNC: 8.2 G/DL (ref 11.7–15.7)
HGB BLD-MCNC: 8.3 G/DL (ref 11.7–15.7)
HGB BLD-MCNC: 8.3 G/DL (ref 11.7–15.7)
HGB BLD-MCNC: 8.5 G/DL (ref 11.7–15.7)
HGB BLD-MCNC: 8.6 G/DL (ref 11.7–15.7)
HGB BLD-MCNC: 8.9 G/DL (ref 11.7–15.7)
HGB BLD-MCNC: 9 G/DL (ref 11.7–15.7)
HGB BLD-MCNC: 9.1 G/DL (ref 11.7–15.7)
HGB BLD-MCNC: 9.2 G/DL (ref 11.7–15.7)
HGB BLD-MCNC: 9.2 G/DL (ref 11.7–15.7)
HGB BLD-MCNC: 9.3 G/DL (ref 11.7–15.7)
HGB BLD-MCNC: 9.4 G/DL (ref 11.7–15.7)
HGB UR QL STRIP: ABNORMAL
HGB UR QL STRIP: NEGATIVE
HGB UR QL STRIP: NEGATIVE
HOLD SPECIMEN: NORMAL
HSV1 DNA SPEC QL NAA+PROBE: NEGATIVE
HSV2 DNA SPEC QL NAA+PROBE: NEGATIVE
HYALINE CASTS: 1 /LPF
HYALINE CASTS: 3 /LPF
IC-%PRED-PRE: 76 %
IC-PRE: 1.29 L
IC-PRED: 1.68 L
IGG SERPL-MCNC: 889 MG/DL (ref 610–1616)
IMM GRANULOCYTES # BLD: 0 10E3/UL
IMM GRANULOCYTES # BLD: 0.1 10E3/UL
IMM GRANULOCYTES NFR BLD: 0 %
IMM GRANULOCYTES NFR BLD: 0 %
IMM GRANULOCYTES NFR BLD: 1 %
INR PPP: 1.03 (ref 0.85–1.15)
INR PPP: 1.05 (ref 0.85–1.15)
INTERPRETATION ECG - MUSE: NORMAL
IRON BINDING CAPACITY (ROCHE): 199 UG/DL (ref 240–430)
IRON BINDING CAPACITY (ROCHE): 244 UG/DL (ref 240–430)
IRON SATN MFR SERPL: 11 % (ref 15–46)
IRON SATN MFR SERPL: 16 % (ref 15–46)
IRON SERPL-MCNC: 21 UG/DL (ref 37–145)
IRON SERPL-MCNC: 40 UG/DL (ref 37–145)
ISSUE DATE AND TIME: NORMAL
ISSUE DATE AND TIME: NORMAL
KAPPA LC FREE SER-MCNC: 4.63 MG/DL (ref 0.33–1.94)
KAPPA LC FREE/LAMBDA FREE SER NEPH: 1.48 {RATIO} (ref 0.26–1.65)
KETONES UR STRIP-MCNC: NEGATIVE MG/DL
LACTATE BLD-SCNC: 2 MMOL/L
LACTATE BLD-SCNC: 2.1 MMOL/L
LACTATE BLD-SCNC: 2.2 MMOL/L
LACTATE SERPL-SCNC: 1 MMOL/L (ref 0.7–2)
LACTATE SERPL-SCNC: 1.1 MMOL/L (ref 0.7–2)
LACTATE SERPL-SCNC: 1.8 MMOL/L (ref 0.7–2)
LAMBDA LC FREE SERPL-MCNC: 3.12 MG/DL (ref 0.57–2.63)
LDLC SERPL CALC-MCNC: 41 MG/DL
LEUKOCYTE ESTERASE UR QL STRIP: ABNORMAL
LEUKOCYTE ESTERASE UR QL STRIP: NEGATIVE
LEUKOCYTE ESTERASE UR QL STRIP: NEGATIVE
LIPASE SERPL-CCNC: 19 U/L (ref 13–60)
LIPASE SERPL-CCNC: 19 U/L (ref 13–60)
LIPASE SERPL-CCNC: 25 U/L (ref 13–60)
LIPASE SERPL-CCNC: 35 U/L (ref 13–60)
LVEF ECHO: NORMAL
LVEF ECHO: NORMAL
LYMPHOCYTES # BLD AUTO: 0.3 10E3/UL (ref 0.8–5.3)
LYMPHOCYTES # BLD AUTO: 0.3 10E3/UL (ref 0.8–5.3)
LYMPHOCYTES # BLD AUTO: 0.4 10E3/UL (ref 0.8–5.3)
LYMPHOCYTES # BLD AUTO: 0.4 10E3/UL (ref 0.8–5.3)
LYMPHOCYTES # BLD AUTO: 0.5 10E3/UL (ref 0.8–5.3)
LYMPHOCYTES # BLD AUTO: 0.5 10E3/UL (ref 0.8–5.3)
LYMPHOCYTES # BLD AUTO: 0.7 10E3/UL (ref 0.8–5.3)
LYMPHOCYTES NFR BLD AUTO: 2 %
LYMPHOCYTES NFR BLD AUTO: 3 %
LYMPHOCYTES NFR BLD AUTO: 4 %
LYMPHOCYTES NFR BLD AUTO: 6 %
LYMPHOCYTES NFR BLD AUTO: 6 %
LYMPHOCYTES NFR BLD AUTO: 8 %
LYMPHOCYTES NFR BLD AUTO: 8 %
M PROTEIN SERPL ELPH-MCNC: 0.1 G/DL
MAGNESIUM SERPL-MCNC: 0.8 MG/DL (ref 1.7–2.3)
MAGNESIUM SERPL-MCNC: 0.8 MG/DL (ref 1.7–2.3)
MAGNESIUM SERPL-MCNC: 1.1 MG/DL (ref 1.7–2.3)
MAGNESIUM SERPL-MCNC: 1.3 MG/DL (ref 1.7–2.3)
MAGNESIUM SERPL-MCNC: 1.4 MG/DL (ref 1.7–2.3)
MAGNESIUM SERPL-MCNC: 1.7 MG/DL (ref 1.7–2.3)
MAGNESIUM SERPL-MCNC: 1.7 MG/DL (ref 1.7–2.3)
MAGNESIUM SERPL-MCNC: 1.8 MG/DL (ref 1.7–2.3)
MAGNESIUM SERPL-MCNC: 1.9 MG/DL (ref 1.7–2.3)
MAGNESIUM SERPL-MCNC: 1.9 MG/DL (ref 1.7–2.3)
MAGNESIUM SERPL-MCNC: 2.1 MG/DL (ref 1.7–2.3)
MAGNESIUM SERPL-MCNC: 2.5 MG/DL (ref 1.7–2.3)
MCH RBC QN AUTO: 25.5 PG (ref 26.5–33)
MCH RBC QN AUTO: 25.7 PG (ref 26.5–33)
MCH RBC QN AUTO: 26 PG (ref 26.5–33)
MCH RBC QN AUTO: 26.1 PG (ref 26.5–33)
MCH RBC QN AUTO: 26.2 PG (ref 26.5–33)
MCH RBC QN AUTO: 26.3 PG (ref 26.5–33)
MCH RBC QN AUTO: 26.3 PG (ref 26.5–33)
MCH RBC QN AUTO: 26.4 PG (ref 26.5–33)
MCH RBC QN AUTO: 26.5 PG (ref 26.5–33)
MCH RBC QN AUTO: 26.6 PG (ref 26.5–33)
MCH RBC QN AUTO: 27.5 PG (ref 26.5–33)
MCH RBC QN AUTO: 27.8 PG (ref 26.5–33)
MCH RBC QN AUTO: 28.1 PG (ref 26.5–33)
MCH RBC QN AUTO: 28.2 PG (ref 26.5–33)
MCH RBC QN AUTO: 28.3 PG (ref 26.5–33)
MCH RBC QN AUTO: 28.4 PG (ref 26.5–33)
MCH RBC QN AUTO: 28.5 PG (ref 26.5–33)
MCH RBC QN AUTO: 28.7 PG (ref 26.5–33)
MCH RBC QN AUTO: 29.3 PG (ref 26.5–33)
MCHC RBC AUTO-ENTMCNC: 29.5 G/DL (ref 31.5–36.5)
MCHC RBC AUTO-ENTMCNC: 29.9 G/DL (ref 31.5–36.5)
MCHC RBC AUTO-ENTMCNC: 29.9 G/DL (ref 31.5–36.5)
MCHC RBC AUTO-ENTMCNC: 30 G/DL (ref 31.5–36.5)
MCHC RBC AUTO-ENTMCNC: 30.4 G/DL (ref 31.5–36.5)
MCHC RBC AUTO-ENTMCNC: 30.5 G/DL (ref 31.5–36.5)
MCHC RBC AUTO-ENTMCNC: 30.6 G/DL (ref 31.5–36.5)
MCHC RBC AUTO-ENTMCNC: 30.6 G/DL (ref 31.5–36.5)
MCHC RBC AUTO-ENTMCNC: 30.8 G/DL (ref 31.5–36.5)
MCHC RBC AUTO-ENTMCNC: 30.8 G/DL (ref 31.5–36.5)
MCHC RBC AUTO-ENTMCNC: 30.9 G/DL (ref 31.5–36.5)
MCHC RBC AUTO-ENTMCNC: 31.2 G/DL (ref 31.5–36.5)
MCHC RBC AUTO-ENTMCNC: 31.4 G/DL (ref 31.5–36.5)
MCHC RBC AUTO-ENTMCNC: 31.5 G/DL (ref 31.5–36.5)
MCHC RBC AUTO-ENTMCNC: 31.5 G/DL (ref 31.5–36.5)
MCHC RBC AUTO-ENTMCNC: 31.7 G/DL (ref 31.5–36.5)
MCHC RBC AUTO-ENTMCNC: 31.9 G/DL (ref 31.5–36.5)
MCHC RBC AUTO-ENTMCNC: 32.1 G/DL (ref 31.5–36.5)
MCHC RBC AUTO-ENTMCNC: 32.2 G/DL (ref 31.5–36.5)
MCHC RBC AUTO-ENTMCNC: 32.9 G/DL (ref 31.5–36.5)
MCV RBC AUTO: 80 FL (ref 78–100)
MCV RBC AUTO: 83 FL (ref 78–100)
MCV RBC AUTO: 84 FL (ref 78–100)
MCV RBC AUTO: 85 FL (ref 78–100)
MCV RBC AUTO: 86 FL (ref 78–100)
MCV RBC AUTO: 87 FL (ref 78–100)
MCV RBC AUTO: 88 FL (ref 78–100)
MCV RBC AUTO: 88 FL (ref 78–100)
MCV RBC AUTO: 89 FL (ref 78–100)
MCV RBC AUTO: 92 FL (ref 78–100)
MCV RBC AUTO: 94 FL (ref 78–100)
MCV RBC AUTO: 94 FL (ref 78–100)
MCV RBC AUTO: 95 FL (ref 78–100)
MCV RBC AUTO: 95 FL (ref 78–100)
MONOCYTES # BLD AUTO: 0.5 10E3/UL (ref 0–1.3)
MONOCYTES # BLD AUTO: 0.5 10E3/UL (ref 0–1.3)
MONOCYTES # BLD AUTO: 0.6 10E3/UL (ref 0–1.3)
MONOCYTES # BLD AUTO: 0.6 10E3/UL (ref 0–1.3)
MONOCYTES # BLD AUTO: 0.8 10E3/UL (ref 0–1.3)
MONOCYTES # BLD AUTO: 0.9 10E3/UL (ref 0–1.3)
MONOCYTES # BLD AUTO: 0.9 10E3/UL (ref 0–1.3)
MONOCYTES NFR BLD AUTO: 10 %
MONOCYTES NFR BLD AUTO: 5 %
MONOCYTES NFR BLD AUTO: 6 %
MONOCYTES NFR BLD AUTO: 6 %
MONOCYTES NFR BLD AUTO: 8 %
MUCOUS THREADS #/AREA URNS LPF: PRESENT /LPF
MYCOPHENOLATE SERPL LC/MS/MS-MCNC: 7.83 MG/L (ref 1–3.5)
MYCOPHENOLATE-G SERPL LC/MS/MS-MCNC: 114.5 MG/L (ref 30–95)
NEUTROPHILS # BLD AUTO: 13.2 10E3/UL (ref 1.6–8.3)
NEUTROPHILS # BLD AUTO: 4 10E3/UL (ref 1.6–8.3)
NEUTROPHILS # BLD AUTO: 5.2 10E3/UL (ref 1.6–8.3)
NEUTROPHILS # BLD AUTO: 7.1 10E3/UL (ref 1.6–8.3)
NEUTROPHILS # BLD AUTO: 7.5 10E3/UL (ref 1.6–8.3)
NEUTROPHILS # BLD AUTO: 8.7 10E3/UL (ref 1.6–8.3)
NEUTROPHILS # BLD AUTO: 9 10E3/UL (ref 1.6–8.3)
NEUTROPHILS NFR BLD AUTO: 81 %
NEUTROPHILS NFR BLD AUTO: 83 %
NEUTROPHILS NFR BLD AUTO: 83 %
NEUTROPHILS NFR BLD AUTO: 84 %
NEUTROPHILS NFR BLD AUTO: 87 %
NEUTROPHILS NFR BLD AUTO: 89 %
NEUTROPHILS NFR BLD AUTO: 90 %
NITRATE UR QL: NEGATIVE
NONHDLC SERPL-MCNC: 75 MG/DL
NOROV GI+II ORF1-ORF2 JNC STL QL NAA+PR: NOT DETECTED
NOROVIRUS GI+II RNA STL QL NAA+NON-PROBE: NEGATIVE
NRBC # BLD AUTO: 0 10E3/UL
NRBC BLD AUTO-RTO: 0 /100
NT-PROBNP SERPL-MCNC: 1871 PG/ML (ref 0–900)
NT-PROBNP SERPL-MCNC: 4557 PG/ML (ref 0–900)
NT-PROBNP SERPL-MCNC: ABNORMAL PG/ML (ref 0–900)
O+P STL MICRO: NEGATIVE
O2/TOTAL GAS SETTING VFR VENT: 35 %
ORGAN: NORMAL
P AXIS - MUSE: 12 DEGREES
P AXIS - MUSE: 21 DEGREES
P AXIS - MUSE: 22 DEGREES
P AXIS - MUSE: 32 DEGREES
P SHIGELLOIDES DNA STL QL NAA+NON-PROBE: NEGATIVE
PATH REPORT.COMMENTS IMP SPEC: NORMAL
PATH REPORT.COMMENTS IMP SPEC: NORMAL
PATH REPORT.FINAL DX SPEC: NORMAL
PATH REPORT.GROSS SPEC: NORMAL
PATH REPORT.MICROSCOPIC SPEC OTHER STN: NORMAL
PATH REPORT.RELEVANT HX SPEC: NORMAL
PCO2 BLDA: 36 MM HG (ref 35–45)
PCO2 BLDA: 42 MM HG (ref 35–45)
PCO2 BLDA: 47 MM HG (ref 35–45)
PH BLDA: 7.31 [PH] (ref 7.35–7.45)
PH BLDA: 7.36 [PH] (ref 7.35–7.45)
PH BLDA: 7.37 [PH] (ref 7.35–7.45)
PH UR STRIP: 5.5 [PH] (ref 5–7)
PH UR STRIP: 5.5 [PH] (ref 5–7)
PH UR STRIP: 6 [PH] (ref 5–7)
PHOSPHATE SERPL-MCNC: 1.8 MG/DL (ref 2.5–4.5)
PHOSPHATE SERPL-MCNC: 2 MG/DL (ref 2.5–4.5)
PHOSPHATE SERPL-MCNC: 2.1 MG/DL (ref 2.5–4.5)
PHOSPHATE SERPL-MCNC: 2.3 MG/DL (ref 2.5–4.5)
PHOSPHATE SERPL-MCNC: 2.7 MG/DL (ref 2.5–4.5)
PHOSPHATE SERPL-MCNC: 2.7 MG/DL (ref 2.5–4.5)
PHOSPHATE SERPL-MCNC: 2.9 MG/DL (ref 2.5–4.5)
PHOSPHATE SERPL-MCNC: 2.9 MG/DL (ref 2.5–4.5)
PHOSPHATE SERPL-MCNC: 3.4 MG/DL (ref 2.5–4.5)
PHOSPHATE SERPL-MCNC: 3.6 MG/DL (ref 2.5–4.5)
PHOSPHATE SERPL-MCNC: 3.9 MG/DL (ref 2.5–4.5)
PHOTO IMAGE: NORMAL
PLATELET # BLD AUTO: 155 10E3/UL (ref 150–450)
PLATELET # BLD AUTO: 171 10E3/UL (ref 150–450)
PLATELET # BLD AUTO: 180 10E3/UL (ref 150–450)
PLATELET # BLD AUTO: 182 10E3/UL (ref 150–450)
PLATELET # BLD AUTO: 198 10E3/UL (ref 150–450)
PLATELET # BLD AUTO: 203 10E3/UL (ref 150–450)
PLATELET # BLD AUTO: 211 10E3/UL (ref 150–450)
PLATELET # BLD AUTO: 211 10E3/UL (ref 150–450)
PLATELET # BLD AUTO: 212 10E3/UL (ref 150–450)
PLATELET # BLD AUTO: 234 10E3/UL (ref 150–450)
PLATELET # BLD AUTO: 235 10E3/UL (ref 150–450)
PLATELET # BLD AUTO: 246 10E3/UL (ref 150–450)
PLATELET # BLD AUTO: 248 10E3/UL (ref 150–450)
PLATELET # BLD AUTO: 250 10E3/UL (ref 150–450)
PLATELET # BLD AUTO: 252 10E3/UL (ref 150–450)
PLATELET # BLD AUTO: 254 10E3/UL (ref 150–450)
PLATELET # BLD AUTO: 256 10E3/UL (ref 150–450)
PLATELET # BLD AUTO: 257 10E3/UL (ref 150–450)
PLATELET # BLD AUTO: 272 10E3/UL (ref 150–450)
PLATELET # BLD AUTO: 272 10E3/UL (ref 150–450)
PLATELET # BLD AUTO: 273 10E3/UL (ref 150–450)
PLATELET # BLD AUTO: 282 10E3/UL (ref 150–450)
PLATELET # BLD AUTO: 289 10E3/UL (ref 150–450)
PLATELET # BLD AUTO: 290 10E3/UL (ref 150–450)
PLATELET # BLD AUTO: 290 10E3/UL (ref 150–450)
PLATELET # BLD AUTO: 295 10E3/UL (ref 150–450)
PLATELET # BLD AUTO: 298 10E3/UL (ref 150–450)
PO2 BLDA: 118 MM HG (ref 80–105)
PO2 BLDA: 121 MM HG (ref 80–105)
PO2 BLDA: 126 MM HG (ref 80–105)
POTASSIUM BLD-SCNC: 3.7 MMOL/L (ref 3.5–5)
POTASSIUM BLD-SCNC: 4.4 MMOL/L (ref 3.5–5)
POTASSIUM BLD-SCNC: 4.5 MMOL/L (ref 3.5–5)
POTASSIUM SERPL-SCNC: 3.4 MMOL/L (ref 3.4–5.3)
POTASSIUM SERPL-SCNC: 3.5 MMOL/L (ref 3.4–5.3)
POTASSIUM SERPL-SCNC: 3.8 MMOL/L (ref 3.4–5.3)
POTASSIUM SERPL-SCNC: 3.9 MMOL/L (ref 3.4–5.3)
POTASSIUM SERPL-SCNC: 4 MMOL/L (ref 3.4–5.3)
POTASSIUM SERPL-SCNC: 4.1 MMOL/L (ref 3.4–5.3)
POTASSIUM SERPL-SCNC: 4.1 MMOL/L (ref 3.4–5.3)
POTASSIUM SERPL-SCNC: 4.2 MMOL/L (ref 3.4–5.3)
POTASSIUM SERPL-SCNC: 4.2 MMOL/L (ref 3.4–5.3)
POTASSIUM SERPL-SCNC: 4.3 MMOL/L (ref 3.4–5.3)
POTASSIUM SERPL-SCNC: 4.4 MMOL/L (ref 3.4–5.3)
POTASSIUM SERPL-SCNC: 4.4 MMOL/L (ref 3.4–5.3)
POTASSIUM SERPL-SCNC: 4.5 MMOL/L (ref 3.4–5.3)
POTASSIUM SERPL-SCNC: 4.5 MMOL/L (ref 3.4–5.3)
POTASSIUM SERPL-SCNC: 4.6 MMOL/L (ref 3.4–5.3)
POTASSIUM SERPL-SCNC: 4.7 MMOL/L (ref 3.4–5.3)
POTASSIUM SERPL-SCNC: 4.8 MMOL/L (ref 3.4–5.3)
POTASSIUM SERPL-SCNC: 4.9 MMOL/L (ref 3.4–5.3)
POTASSIUM SERPL-SCNC: 4.9 MMOL/L (ref 3.4–5.3)
POTASSIUM SERPL-SCNC: 5.1 MMOL/L (ref 3.4–5.3)
POTASSIUM SERPL-SCNC: 5.1 MMOL/L (ref 3.4–5.3)
PR INTERVAL - MUSE: 140 MS
PR INTERVAL - MUSE: 144 MS
PR INTERVAL - MUSE: 150 MS
PR INTERVAL - MUSE: 166 MS
PROT PATTERN SERPL ELPH-IMP: ABNORMAL
PROT SERPL-MCNC: 5.2 G/DL (ref 6.4–8.3)
PROT SERPL-MCNC: 5.5 G/DL (ref 6.4–8.3)
PROT SERPL-MCNC: 6.1 G/DL (ref 6.4–8.3)
PROT SERPL-MCNC: 6.3 G/DL (ref 6.4–8.3)
PROT SERPL-MCNC: 6.3 G/DL (ref 6.4–8.3)
PROT SERPL-MCNC: 6.4 G/DL (ref 6.4–8.3)
PROT SERPL-MCNC: 6.6 G/DL (ref 6.4–8.3)
PROT SERPL-MCNC: 6.7 G/DL (ref 6.4–8.3)
PROT SERPL-MCNC: 6.7 G/DL (ref 6.4–8.3)
PROT SERPL-MCNC: 6.8 G/DL (ref 6.4–8.3)
PROT SERPL-MCNC: 6.8 G/DL (ref 6.4–8.3)
PROT SERPL-MCNC: 6.9 G/DL (ref 6.4–8.3)
PROTOCOL CUTOFF: NORMAL
QRS DURATION - MUSE: 72 MS
QRS DURATION - MUSE: 72 MS
QRS DURATION - MUSE: 74 MS
QRS DURATION - MUSE: 76 MS
QT - MUSE: 384 MS
QT - MUSE: 404 MS
QT - MUSE: 410 MS
QT - MUSE: 432 MS
QTC - MUSE: 433 MS
QTC - MUSE: 445 MS
QTC - MUSE: 451 MS
QTC - MUSE: 456 MS
R AXIS - MUSE: -1 DEGREES
R AXIS - MUSE: 15 DEGREES
R AXIS - MUSE: 63 DEGREES
R AXIS - MUSE: 89 DEGREES
RADIOLOGIST FLAGS: ABNORMAL
RADIOLOGIST FLAGS: ABNORMAL
RBC # BLD AUTO: 2.89 10E6/UL (ref 3.8–5.2)
RBC # BLD AUTO: 3.15 10E6/UL (ref 3.8–5.2)
RBC # BLD AUTO: 3.17 10E6/UL (ref 3.8–5.2)
RBC # BLD AUTO: 3.17 10E6/UL (ref 3.8–5.2)
RBC # BLD AUTO: 3.21 10E6/UL (ref 3.8–5.2)
RBC # BLD AUTO: 3.21 10E6/UL (ref 3.8–5.2)
RBC # BLD AUTO: 3.23 10E6/UL (ref 3.8–5.2)
RBC # BLD AUTO: 3.31 10E6/UL (ref 3.8–5.2)
RBC # BLD AUTO: 3.31 10E6/UL (ref 3.8–5.2)
RBC # BLD AUTO: 3.37 10E6/UL (ref 3.8–5.2)
RBC # BLD AUTO: 3.48 10E6/UL (ref 3.8–5.2)
RBC # BLD AUTO: 3.7 10E6/UL (ref 3.8–5.2)
RBC # BLD AUTO: 3.8 10E6/UL (ref 3.8–5.2)
RBC # BLD AUTO: 3.83 10E6/UL (ref 3.8–5.2)
RBC # BLD AUTO: 3.84 10E6/UL (ref 3.8–5.2)
RBC # BLD AUTO: 3.85 10E6/UL (ref 3.8–5.2)
RBC # BLD AUTO: 3.86 10E6/UL (ref 3.8–5.2)
RBC # BLD AUTO: 3.89 10E6/UL (ref 3.8–5.2)
RBC # BLD AUTO: 3.98 10E6/UL (ref 3.8–5.2)
RBC # BLD AUTO: 4.07 10E6/UL (ref 3.8–5.2)
RBC # BLD AUTO: 4.12 10E6/UL (ref 3.8–5.2)
RBC # BLD AUTO: 4.13 10E6/UL (ref 3.8–5.2)
RBC # BLD AUTO: 4.14 10E6/UL (ref 3.8–5.2)
RBC # BLD AUTO: 4.21 10E6/UL (ref 3.8–5.2)
RBC # BLD AUTO: 4.21 10E6/UL (ref 3.8–5.2)
RBC # BLD AUTO: 4.24 10E6/UL (ref 3.8–5.2)
RBC URINE: 1 /HPF
RBC URINE: 2 /HPF
RBC URINE: 3 /HPF
RSV RNA SPEC NAA+PROBE: NEGATIVE
RVA NSP5 STL QL NAA+PROBE: NOT DETECTED
RVA RNA STL QL NAA+NON-PROBE: NEGATIVE
RVPLETH-%PRED-PRE: 137 %
RVPLETH-PRE: 2.71 L
RVPLETH-PRED: 1.97 L
SA 1 CELL: NORMAL
SA 1 TEST METHOD: NORMAL
SA 2 CELL: NORMAL
SA 2 TEST METHOD: NORMAL
SA1 HI RISK ABY: NORMAL
SA1 MOD RISK ABY: NORMAL
SA2 HI RISK ABY: NORMAL
SA2 MOD RISK ABY: NORMAL
SALMONELLA SP RPOD STL QL NAA+PROBE: NEGATIVE
SALMONELLA SP RPOD STL QL NAA+PROBE: NOT DETECTED
SAPO I+II+IV+V RNA STL QL NAA+NON-PROBE: NEGATIVE
SARS-COV-2 RNA RESP QL NAA+PROBE: NEGATIVE
SARS-COV-2 RNA RESP QL NAA+PROBE: POSITIVE
SARS-COV-2 RNA RESP QL NAA+PROBE: POSITIVE
SHIGELLA SP+EIEC IPAH ST NAA+NON-PROBE: NEGATIVE
SHIGELLA SP+EIEC IPAH STL QL NAA+PROBE: NOT DETECTED
SIROLIMUS BLD-MCNC: 4.7 UG/L (ref 5–15)
SIROLIMUS BLD-MCNC: 7.6 UG/L (ref 5–15)
SIROLIMUS BLD-MCNC: 8.3 UG/L (ref 5–15)
SIROLIMUS BLD-MCNC: <1 UG/L (ref 5–15)
SIROLIMUS BLD-MCNC: <1 UG/L (ref 5–15)
SODIUM BLD-SCNC: 138 MMOL/L (ref 133–144)
SODIUM BLD-SCNC: 140 MMOL/L (ref 133–144)
SODIUM BLD-SCNC: 140 MMOL/L (ref 133–144)
SODIUM SERPL-SCNC: 131 MMOL/L (ref 136–145)
SODIUM SERPL-SCNC: 133 MMOL/L (ref 136–145)
SODIUM SERPL-SCNC: 134 MMOL/L (ref 135–145)
SODIUM SERPL-SCNC: 136 MMOL/L (ref 135–145)
SODIUM SERPL-SCNC: 136 MMOL/L (ref 136–145)
SODIUM SERPL-SCNC: 136 MMOL/L (ref 136–145)
SODIUM SERPL-SCNC: 137 MMOL/L (ref 135–145)
SODIUM SERPL-SCNC: 137 MMOL/L (ref 135–145)
SODIUM SERPL-SCNC: 137 MMOL/L (ref 136–145)
SODIUM SERPL-SCNC: 138 MMOL/L (ref 135–145)
SODIUM SERPL-SCNC: 138 MMOL/L (ref 135–145)
SODIUM SERPL-SCNC: 138 MMOL/L (ref 136–145)
SODIUM SERPL-SCNC: 139 MMOL/L (ref 135–145)
SODIUM SERPL-SCNC: 139 MMOL/L (ref 136–145)
SODIUM SERPL-SCNC: 140 MMOL/L (ref 136–145)
SODIUM SERPL-SCNC: 141 MMOL/L (ref 136–145)
SODIUM SERPL-SCNC: 143 MMOL/L (ref 136–145)
SP GR UR STRIP: 1.01 (ref 1–1.03)
SPECIMEN EXPIRATION DATE: NORMAL
SQUAMOUS EPITHELIAL: <1 /HPF
SQUAMOUS EPITHELIAL: <1 /HPF
SYSTOLIC BLOOD PRESSURE - MUSE: NORMAL MMHG
T AXIS - MUSE: -16 DEGREES
T AXIS - MUSE: -6 DEGREES
T AXIS - MUSE: 21 DEGREES
T AXIS - MUSE: 44 DEGREES
TACROLIMUS BLD-MCNC: 11 UG/L (ref 5–15)
TACROLIMUS BLD-MCNC: 13 UG/L (ref 5–15)
TACROLIMUS BLD-MCNC: 14.7 UG/L (ref 5–15)
TACROLIMUS BLD-MCNC: 2.7 UG/L (ref 5–15)
TACROLIMUS BLD-MCNC: 3.3 UG/L (ref 5–15)
TACROLIMUS BLD-MCNC: 4.3 UG/L (ref 5–15)
TACROLIMUS BLD-MCNC: 4.3 UG/L (ref 5–15)
TACROLIMUS BLD-MCNC: 4.5 UG/L (ref 5–15)
TACROLIMUS BLD-MCNC: 4.8 UG/L (ref 5–15)
TACROLIMUS BLD-MCNC: 6.3 UG/L (ref 5–15)
TACROLIMUS BLD-MCNC: 6.4 UG/L (ref 5–15)
TACROLIMUS BLD-MCNC: 8.3 UG/L (ref 5–15)
TACROLIMUS BLD-MCNC: 9.1 UG/L (ref 5–15)
TLCPLETH-%PRED-PRE: 107 %
TLCPLETH-PRE: 4.94 L
TLCPLETH-PRED: 4.6 L
TME LAST DOSE: ABNORMAL H
TME LAST DOSE: NORMAL H
TOTAL PROTEIN SERUM FOR ELP: 6.6 G/DL (ref 6.4–8.3)
TRIGL SERPL-MCNC: 168 MG/DL
TROPONIN T SERPL HS-MCNC: 33 NG/L
TROPONIN T SERPL HS-MCNC: 39 NG/L
TROPONIN T SERPL HS-MCNC: 40 NG/L
TROPONIN T SERPL HS-MCNC: 53 NG/L
TSH SERPL DL<=0.005 MIU/L-ACNC: 1.57 UIU/ML (ref 0.3–4.2)
TSH SERPL DL<=0.005 MIU/L-ACNC: 1.61 UIU/ML (ref 0.3–4.2)
TSH SERPL DL<=0.005 MIU/L-ACNC: 3.33 UIU/ML (ref 0.3–4.2)
UNACCEPTABLE ANTIGENS: NORMAL
UNIT ABO/RH: NORMAL
UNIT NUMBER: NORMAL
UNIT STATUS: NORMAL
UNIT TYPE ISBT: 600
UNOS CPRA: 95
UROBILINOGEN UR STRIP-MCNC: NORMAL MG/DL
V CHOL+PARA RFBL+TRKH+TNAA STL QL NAA+PR: NOT DETECTED
V CHOLERAE DNA SPEC QL NAA+PROBE: NEGATIVE
VA-%PRED-PRE: 79 %
VA-PRE: 3.44 L
VC-%PRED-PRE: 79 %
VC-PRE: 2.23 L
VC-PRED: 2.81 L
VENTRICULAR RATE- MUSE: 67 BPM
VENTRICULAR RATE- MUSE: 67 BPM
VENTRICULAR RATE- MUSE: 73 BPM
VENTRICULAR RATE- MUSE: 83 BPM
VIBRIO DNA SPEC NAA+PROBE: NEGATIVE
VIT B12 SERPL-MCNC: 481 PG/ML (ref 232–1245)
WBC # BLD AUTO: 10.1 10E3/UL (ref 4–11)
WBC # BLD AUTO: 10.3 10E3/UL (ref 4–11)
WBC # BLD AUTO: 10.6 10E3/UL (ref 4–11)
WBC # BLD AUTO: 10.9 10E3/UL (ref 4–11)
WBC # BLD AUTO: 11.5 10E3/UL (ref 4–11)
WBC # BLD AUTO: 12.9 10E3/UL (ref 4–11)
WBC # BLD AUTO: 14.7 10E3/UL (ref 4–11)
WBC # BLD AUTO: 3 10E3/UL (ref 4–11)
WBC # BLD AUTO: 3.7 10E3/UL (ref 4–11)
WBC # BLD AUTO: 4.8 10E3/UL (ref 4–11)
WBC # BLD AUTO: 5.5 10E3/UL (ref 4–11)
WBC # BLD AUTO: 5.8 10E3/UL (ref 4–11)
WBC # BLD AUTO: 6.4 10E3/UL (ref 4–11)
WBC # BLD AUTO: 6.6 10E3/UL (ref 4–11)
WBC # BLD AUTO: 7.3 10E3/UL (ref 4–11)
WBC # BLD AUTO: 7.5 10E3/UL (ref 4–11)
WBC # BLD AUTO: 7.5 10E3/UL (ref 4–11)
WBC # BLD AUTO: 7.6 10E3/UL (ref 4–11)
WBC # BLD AUTO: 8 10E3/UL (ref 4–11)
WBC # BLD AUTO: 8 10E3/UL (ref 4–11)
WBC # BLD AUTO: 8.5 10E3/UL (ref 4–11)
WBC # BLD AUTO: 8.7 10E3/UL (ref 4–11)
WBC # BLD AUTO: 8.8 10E3/UL (ref 4–11)
WBC # BLD AUTO: 8.8 10E3/UL (ref 4–11)
WBC # BLD AUTO: 9.5 10E3/UL (ref 4–11)
WBC # BLD AUTO: 9.8 10E3/UL (ref 4–11)
WBC URINE: 1 /HPF
WBC URINE: 4 /HPF
WBC URINE: <1 /HPF
Y ENTERO RECN STL QL NAA+PROBE: NOT DETECTED
Y ENTEROCOL DNA STL QL NAA+PROBE: NEGATIVE
ZZZSA 1  COMMENTS: NORMAL
ZZZSA 2 COMMENTS: NORMAL

## 2023-01-01 PROCEDURE — 84484 ASSAY OF TROPONIN QUANT: CPT | Performed by: FAMILY MEDICINE

## 2023-01-01 PROCEDURE — 87324 CLOSTRIDIUM AG IA: CPT

## 2023-01-01 PROCEDURE — 83540 ASSAY OF IRON: CPT | Performed by: FAMILY MEDICINE

## 2023-01-01 PROCEDURE — 250N000013 HC RX MED GY IP 250 OP 250 PS 637: Performed by: STUDENT IN AN ORGANIZED HEALTH CARE EDUCATION/TRAINING PROGRAM

## 2023-01-01 PROCEDURE — 83735 ASSAY OF MAGNESIUM: CPT | Performed by: EMERGENCY MEDICINE

## 2023-01-01 PROCEDURE — 86140 C-REACTIVE PROTEIN: CPT

## 2023-01-01 PROCEDURE — 250N000011 HC RX IP 250 OP 636: Mod: JZ | Performed by: INTERNAL MEDICINE

## 2023-01-01 PROCEDURE — 36415 COLL VENOUS BLD VENIPUNCTURE: CPT | Performed by: SURGERY

## 2023-01-01 PROCEDURE — 99213 OFFICE O/P EST LOW 20 MIN: CPT | Mod: 95 | Performed by: NURSE PRACTITIONER

## 2023-01-01 PROCEDURE — 36415 COLL VENOUS BLD VENIPUNCTURE: CPT | Performed by: PHYSICIAN ASSISTANT

## 2023-01-01 PROCEDURE — 80053 COMPREHEN METABOLIC PANEL: CPT | Performed by: STUDENT IN AN ORGANIZED HEALTH CARE EDUCATION/TRAINING PROGRAM

## 2023-01-01 PROCEDURE — 999N000157 HC STATISTIC RCP TIME EA 10 MIN

## 2023-01-01 PROCEDURE — 250N000013 HC RX MED GY IP 250 OP 250 PS 637: Performed by: INTERNAL MEDICINE

## 2023-01-01 PROCEDURE — 85025 COMPLETE CBC W/AUTO DIFF WBC: CPT | Performed by: FAMILY MEDICINE

## 2023-01-01 PROCEDURE — 81001 URINALYSIS AUTO W/SCOPE: CPT | Performed by: FAMILY MEDICINE

## 2023-01-01 PROCEDURE — 36415 COLL VENOUS BLD VENIPUNCTURE: CPT | Performed by: EMERGENCY MEDICINE

## 2023-01-01 PROCEDURE — 99285 EMERGENCY DEPT VISIT HI MDM: CPT | Mod: 25

## 2023-01-01 PROCEDURE — 97116 GAIT TRAINING THERAPY: CPT | Mod: GP

## 2023-01-01 PROCEDURE — 120N000001 HC R&B MED SURG/OB

## 2023-01-01 PROCEDURE — 250N000013 HC RX MED GY IP 250 OP 250 PS 637

## 2023-01-01 PROCEDURE — 81003 URINALYSIS AUTO W/O SCOPE: CPT | Performed by: STUDENT IN AN ORGANIZED HEALTH CARE EDUCATION/TRAINING PROGRAM

## 2023-01-01 PROCEDURE — 83690 ASSAY OF LIPASE: CPT | Performed by: EMERGENCY MEDICINE

## 2023-01-01 PROCEDURE — 74176 CT ABD & PELVIS W/O CONTRAST: CPT

## 2023-01-01 PROCEDURE — 250N000012 HC RX MED GY IP 250 OP 636 PS 637: Performed by: STUDENT IN AN ORGANIZED HEALTH CARE EDUCATION/TRAINING PROGRAM

## 2023-01-01 PROCEDURE — 82248 BILIRUBIN DIRECT: CPT | Performed by: PATHOLOGY

## 2023-01-01 PROCEDURE — 85027 COMPLETE CBC AUTOMATED: CPT

## 2023-01-01 PROCEDURE — G0463 HOSPITAL OUTPT CLINIC VISIT: HCPCS | Performed by: NURSE PRACTITIONER

## 2023-01-01 PROCEDURE — 83735 ASSAY OF MAGNESIUM: CPT

## 2023-01-01 PROCEDURE — 999N000128 HC STATISTIC PERIPHERAL IV START W/O US GUIDANCE

## 2023-01-01 PROCEDURE — 86901 BLOOD TYPING SEROLOGIC RH(D): CPT

## 2023-01-01 PROCEDURE — 80053 COMPREHEN METABOLIC PANEL: CPT | Performed by: FAMILY MEDICINE

## 2023-01-01 PROCEDURE — 80069 RENAL FUNCTION PANEL: CPT | Performed by: STUDENT IN AN ORGANIZED HEALTH CARE EDUCATION/TRAINING PROGRAM

## 2023-01-01 PROCEDURE — 87329 GIARDIA AG IA: CPT | Mod: XU | Performed by: PHYSICIAN ASSISTANT

## 2023-01-01 PROCEDURE — 93325 DOPPLER ECHO COLOR FLOW MAPG: CPT | Mod: 26 | Performed by: INTERNAL MEDICINE

## 2023-01-01 PROCEDURE — 86140 C-REACTIVE PROTEIN: CPT | Performed by: PATHOLOGY

## 2023-01-01 PROCEDURE — 80048 BASIC METABOLIC PNL TOTAL CA: CPT

## 2023-01-01 PROCEDURE — 370N000017 HC ANESTHESIA TECHNICAL FEE, PER MIN: Performed by: SURGERY

## 2023-01-01 PROCEDURE — 83690 ASSAY OF LIPASE: CPT | Performed by: FAMILY MEDICINE

## 2023-01-01 PROCEDURE — 250N000012 HC RX MED GY IP 250 OP 636 PS 637: Performed by: INTERNAL MEDICINE

## 2023-01-01 PROCEDURE — 36415 COLL VENOUS BLD VENIPUNCTURE: CPT | Performed by: PATHOLOGY

## 2023-01-01 PROCEDURE — 99233 SBSQ HOSP IP/OBS HIGH 50: CPT | Mod: 24 | Performed by: INTERNAL MEDICINE

## 2023-01-01 PROCEDURE — 85027 COMPLETE CBC AUTOMATED: CPT | Performed by: PATHOLOGY

## 2023-01-01 PROCEDURE — 94640 AIRWAY INHALATION TREATMENT: CPT

## 2023-01-01 PROCEDURE — 258N000003 HC RX IP 258 OP 636

## 2023-01-01 PROCEDURE — 80048 BASIC METABOLIC PNL TOTAL CA: CPT | Performed by: PATHOLOGY

## 2023-01-01 PROCEDURE — 86922 COMPATIBILITY TEST ANTIGLOB: CPT

## 2023-01-01 PROCEDURE — 87209 SMEAR COMPLEX STAIN: CPT | Performed by: PHYSICIAN ASSISTANT

## 2023-01-01 PROCEDURE — 99000 SPECIMEN HANDLING OFFICE-LAB: CPT | Performed by: PATHOLOGY

## 2023-01-01 PROCEDURE — 87040 BLOOD CULTURE FOR BACTERIA: CPT | Performed by: FAMILY MEDICINE

## 2023-01-01 PROCEDURE — 250N000012 HC RX MED GY IP 250 OP 636 PS 637

## 2023-01-01 PROCEDURE — 71275 CT ANGIOGRAPHY CHEST: CPT | Mod: GC | Performed by: RADIOLOGY

## 2023-01-01 PROCEDURE — 94640 AIRWAY INHALATION TREATMENT: CPT | Performed by: EMERGENCY MEDICINE

## 2023-01-01 PROCEDURE — 90662 IIV NO PRSV INCREASED AG IM: CPT

## 2023-01-01 PROCEDURE — 80053 COMPREHEN METABOLIC PANEL: CPT | Performed by: PATHOLOGY

## 2023-01-01 PROCEDURE — 80048 BASIC METABOLIC PNL TOTAL CA: CPT | Performed by: SURGERY

## 2023-01-01 PROCEDURE — 85610 PROTHROMBIN TIME: CPT | Performed by: FAMILY MEDICINE

## 2023-01-01 PROCEDURE — 99285 EMERGENCY DEPT VISIT HI MDM: CPT | Mod: 25 | Performed by: FAMILY MEDICINE

## 2023-01-01 PROCEDURE — 84484 ASSAY OF TROPONIN QUANT: CPT | Performed by: EMERGENCY MEDICINE

## 2023-01-01 PROCEDURE — 84100 ASSAY OF PHOSPHORUS: CPT

## 2023-01-01 PROCEDURE — 93010 ELECTROCARDIOGRAM REPORT: CPT | Performed by: EMERGENCY MEDICINE

## 2023-01-01 PROCEDURE — 99495 TRANSJ CARE MGMT MOD F2F 14D: CPT | Mod: 95 | Performed by: FAMILY MEDICINE

## 2023-01-01 PROCEDURE — 250N000011 HC RX IP 250 OP 636

## 2023-01-01 PROCEDURE — 84443 ASSAY THYROID STIM HORMONE: CPT | Performed by: FAMILY MEDICINE

## 2023-01-01 PROCEDURE — 250N000024 HC ISOFLURANE, PER MIN: Performed by: SURGERY

## 2023-01-01 PROCEDURE — G0463 HOSPITAL OUTPT CLINIC VISIT: HCPCS | Performed by: CASE MANAGER/CARE COORDINATOR

## 2023-01-01 PROCEDURE — 85014 HEMATOCRIT: CPT | Performed by: INTERNAL MEDICINE

## 2023-01-01 PROCEDURE — 999N000111 HC STATISTIC OT IP EVAL DEFER

## 2023-01-01 PROCEDURE — G0378 HOSPITAL OBSERVATION PER HR: HCPCS

## 2023-01-01 PROCEDURE — 96361 HYDRATE IV INFUSION ADD-ON: CPT | Performed by: FAMILY MEDICINE

## 2023-01-01 PROCEDURE — 85018 HEMOGLOBIN: CPT

## 2023-01-01 PROCEDURE — 258N000003 HC RX IP 258 OP 636: Performed by: INTERNAL MEDICINE

## 2023-01-01 PROCEDURE — G0463 HOSPITAL OUTPT CLINIC VISIT: HCPCS | Mod: 25

## 2023-01-01 PROCEDURE — 99418 PROLNG IP/OBS E/M EA 15 MIN: CPT | Performed by: PHYSICIAN ASSISTANT

## 2023-01-01 PROCEDURE — 80197 ASSAY OF TACROLIMUS: CPT | Performed by: STUDENT IN AN ORGANIZED HEALTH CARE EDUCATION/TRAINING PROGRAM

## 2023-01-01 PROCEDURE — 86140 C-REACTIVE PROTEIN: CPT | Performed by: STUDENT IN AN ORGANIZED HEALTH CARE EDUCATION/TRAINING PROGRAM

## 2023-01-01 PROCEDURE — 99222 1ST HOSP IP/OBS MODERATE 55: CPT | Performed by: STUDENT IN AN ORGANIZED HEALTH CARE EDUCATION/TRAINING PROGRAM

## 2023-01-01 PROCEDURE — 99233 SBSQ HOSP IP/OBS HIGH 50: CPT | Performed by: HOSPITALIST

## 2023-01-01 PROCEDURE — 82728 ASSAY OF FERRITIN: CPT | Performed by: FAMILY MEDICINE

## 2023-01-01 PROCEDURE — 94726 PLETHYSMOGRAPHY LUNG VOLUMES: CPT | Performed by: INTERNAL MEDICINE

## 2023-01-01 PROCEDURE — 93971 EXTREMITY STUDY: CPT | Mod: 26 | Performed by: RADIOLOGY

## 2023-01-01 PROCEDURE — 99238 HOSP IP/OBS DSCHRG MGMT 30/<: CPT | Mod: GC | Performed by: STUDENT IN AN ORGANIZED HEALTH CARE EDUCATION/TRAINING PROGRAM

## 2023-01-01 PROCEDURE — 87799 DETECT AGENT NOS DNA QUANT: CPT | Performed by: STUDENT IN AN ORGANIZED HEALTH CARE EDUCATION/TRAINING PROGRAM

## 2023-01-01 PROCEDURE — 99233 SBSQ HOSP IP/OBS HIGH 50: CPT | Mod: FS | Performed by: NURSE PRACTITIONER

## 2023-01-01 PROCEDURE — 93321 DOPPLER ECHO F-UP/LMTD STD: CPT | Mod: 26 | Performed by: INTERNAL MEDICINE

## 2023-01-01 PROCEDURE — 120N000002 HC R&B MED SURG/OB UMMC

## 2023-01-01 PROCEDURE — 99232 SBSQ HOSP IP/OBS MODERATE 35: CPT | Performed by: STUDENT IN AN ORGANIZED HEALTH CARE EDUCATION/TRAINING PROGRAM

## 2023-01-01 PROCEDURE — 83605 ASSAY OF LACTIC ACID: CPT | Performed by: EMERGENCY MEDICINE

## 2023-01-01 PROCEDURE — 97530 THERAPEUTIC ACTIVITIES: CPT | Mod: GP

## 2023-01-01 PROCEDURE — 99223 1ST HOSP IP/OBS HIGH 75: CPT | Mod: GC | Performed by: STUDENT IN AN ORGANIZED HEALTH CARE EDUCATION/TRAINING PROGRAM

## 2023-01-01 PROCEDURE — 94799 UNLISTED PULMONARY SVC/PX: CPT

## 2023-01-01 PROCEDURE — 86833 HLA CLASS II HIGH DEFIN QUAL: CPT | Performed by: STUDENT IN AN ORGANIZED HEALTH CARE EDUCATION/TRAINING PROGRAM

## 2023-01-01 PROCEDURE — G0008 ADMIN INFLUENZA VIRUS VAC: HCPCS

## 2023-01-01 PROCEDURE — 97161 PT EVAL LOW COMPLEX 20 MIN: CPT | Mod: GP

## 2023-01-01 PROCEDURE — 87506 IADNA-DNA/RNA PROBE TQ 6-11: CPT | Performed by: PHYSICIAN ASSISTANT

## 2023-01-01 PROCEDURE — 82784 ASSAY IGA/IGD/IGG/IGM EACH: CPT | Performed by: STUDENT IN AN ORGANIZED HEALTH CARE EDUCATION/TRAINING PROGRAM

## 2023-01-01 PROCEDURE — 83735 ASSAY OF MAGNESIUM: CPT | Performed by: INTERNAL MEDICINE

## 2023-01-01 PROCEDURE — 99284 EMERGENCY DEPT VISIT MOD MDM: CPT | Mod: 25 | Performed by: EMERGENCY MEDICINE

## 2023-01-01 PROCEDURE — 83605 ASSAY OF LACTIC ACID: CPT | Performed by: FAMILY MEDICINE

## 2023-01-01 PROCEDURE — 80195 ASSAY OF SIROLIMUS: CPT | Performed by: INTERNAL MEDICINE

## 2023-01-01 PROCEDURE — 80061 LIPID PANEL: CPT | Performed by: PATHOLOGY

## 2023-01-01 PROCEDURE — 83880 ASSAY OF NATRIURETIC PEPTIDE: CPT | Performed by: EMERGENCY MEDICINE

## 2023-01-01 PROCEDURE — 94729 DIFFUSING CAPACITY: CPT | Performed by: INTERNAL MEDICINE

## 2023-01-01 PROCEDURE — 85379 FIBRIN DEGRADATION QUANT: CPT

## 2023-01-01 PROCEDURE — 250N000011 HC RX IP 250 OP 636: Performed by: EMERGENCY MEDICINE

## 2023-01-01 PROCEDURE — 258N000003 HC RX IP 258 OP 636: Performed by: FAMILY MEDICINE

## 2023-01-01 PROCEDURE — 99239 HOSP IP/OBS DSCHRG MGMT >30: CPT | Performed by: STUDENT IN AN ORGANIZED HEALTH CARE EDUCATION/TRAINING PROGRAM

## 2023-01-01 PROCEDURE — 85730 THROMBOPLASTIN TIME PARTIAL: CPT | Performed by: FAMILY MEDICINE

## 2023-01-01 PROCEDURE — 93308 TTE F-UP OR LMTD: CPT | Mod: 26 | Performed by: INTERNAL MEDICINE

## 2023-01-01 PROCEDURE — 250N000011 HC RX IP 250 OP 636: Mod: JZ | Performed by: ANESTHESIOLOGY

## 2023-01-01 PROCEDURE — 99285 EMERGENCY DEPT VISIT HI MDM: CPT | Mod: 25,CS | Performed by: FAMILY MEDICINE

## 2023-01-01 PROCEDURE — 80197 ASSAY OF TACROLIMUS: CPT | Performed by: INTERNAL MEDICINE

## 2023-01-01 PROCEDURE — 99233 SBSQ HOSP IP/OBS HIGH 50: CPT | Mod: GC | Performed by: STUDENT IN AN ORGANIZED HEALTH CARE EDUCATION/TRAINING PROGRAM

## 2023-01-01 PROCEDURE — 97116 GAIT TRAINING THERAPY: CPT | Mod: GP | Performed by: PHYSICAL THERAPIST

## 2023-01-01 PROCEDURE — 250N000011 HC RX IP 250 OP 636: Mod: JZ | Performed by: FAMILY MEDICINE

## 2023-01-01 PROCEDURE — 99207 PR COMMUNITY PARAMEDIC - PATIENT NOT BILLABLE: CPT

## 2023-01-01 PROCEDURE — 73610 X-RAY EXAM OF ANKLE: CPT | Mod: RT

## 2023-01-01 PROCEDURE — 83550 IRON BINDING TEST: CPT | Performed by: FAMILY MEDICINE

## 2023-01-01 PROCEDURE — 36415 COLL VENOUS BLD VENIPUNCTURE: CPT | Performed by: INTERNAL MEDICINE

## 2023-01-01 PROCEDURE — 93926 LOWER EXTREMITY STUDY: CPT | Mod: RT | Performed by: RADIOLOGY

## 2023-01-01 PROCEDURE — 36415 COLL VENOUS BLD VENIPUNCTURE: CPT

## 2023-01-01 PROCEDURE — 85027 COMPLETE CBC AUTOMATED: CPT | Performed by: PHYSICIAN ASSISTANT

## 2023-01-01 PROCEDURE — 74174 CTA ABD&PLVS W/CONTRAST: CPT | Mod: GC | Performed by: RADIOLOGY

## 2023-01-01 PROCEDURE — 87637 SARSCOV2&INF A&B&RSV AMP PRB: CPT

## 2023-01-01 PROCEDURE — 71250 CT THORAX DX C-: CPT | Mod: GC | Performed by: RADIOLOGY

## 2023-01-01 PROCEDURE — 99443 PR PHYSICIAN TELEPHONE EVALUATION 21-30 MIN: CPT | Mod: 95 | Performed by: FAMILY MEDICINE

## 2023-01-01 PROCEDURE — 80197 ASSAY OF TACROLIMUS: CPT | Performed by: SURGERY

## 2023-01-01 PROCEDURE — 250N000009 HC RX 250

## 2023-01-01 PROCEDURE — 97530 THERAPEUTIC ACTIVITIES: CPT | Mod: GP | Performed by: PHYSICAL THERAPIST

## 2023-01-01 PROCEDURE — 80053 COMPREHEN METABOLIC PANEL: CPT | Performed by: EMERGENCY MEDICINE

## 2023-01-01 PROCEDURE — 250N000011 HC RX IP 250 OP 636: Performed by: SURGERY

## 2023-01-01 PROCEDURE — 88305 TISSUE EXAM BY PATHOLOGIST: CPT | Mod: 26 | Performed by: PATHOLOGY

## 2023-01-01 PROCEDURE — 80053 COMPREHEN METABOLIC PANEL: CPT | Performed by: INTERNAL MEDICINE

## 2023-01-01 PROCEDURE — 71046 X-RAY EXAM CHEST 2 VIEWS: CPT

## 2023-01-01 PROCEDURE — 36415 COLL VENOUS BLD VENIPUNCTURE: CPT | Performed by: STUDENT IN AN ORGANIZED HEALTH CARE EDUCATION/TRAINING PROGRAM

## 2023-01-01 PROCEDURE — 83036 HEMOGLOBIN GLYCOSYLATED A1C: CPT

## 2023-01-01 PROCEDURE — 83735 ASSAY OF MAGNESIUM: CPT | Performed by: FAMILY MEDICINE

## 2023-01-01 PROCEDURE — XW033E5 INTRODUCTION OF REMDESIVIR ANTI-INFECTIVE INTO PERIPHERAL VEIN, PERCUTANEOUS APPROACH, NEW TECHNOLOGY GROUP 5: ICD-10-PCS | Performed by: FAMILY MEDICINE

## 2023-01-01 PROCEDURE — 85004 AUTOMATED DIFF WBC COUNT: CPT

## 2023-01-01 PROCEDURE — 71045 X-RAY EXAM CHEST 1 VIEW: CPT

## 2023-01-01 PROCEDURE — 85025 COMPLETE CBC W/AUTO DIFF WBC: CPT | Performed by: EMERGENCY MEDICINE

## 2023-01-01 PROCEDURE — 96366 THER/PROPH/DIAG IV INF ADDON: CPT

## 2023-01-01 PROCEDURE — 99285 EMERGENCY DEPT VISIT HI MDM: CPT | Mod: 25 | Performed by: EMERGENCY MEDICINE

## 2023-01-01 PROCEDURE — G0180 MD CERTIFICATION HHA PATIENT: HCPCS | Performed by: FAMILY MEDICINE

## 2023-01-01 PROCEDURE — 120N000003 HC R&B IMCU UMMC

## 2023-01-01 PROCEDURE — 84295 ASSAY OF SERUM SODIUM: CPT

## 2023-01-01 PROCEDURE — 82607 VITAMIN B-12: CPT | Performed by: FAMILY MEDICINE

## 2023-01-01 PROCEDURE — 96374 THER/PROPH/DIAG INJ IV PUSH: CPT | Performed by: EMERGENCY MEDICINE

## 2023-01-01 PROCEDURE — 82310 ASSAY OF CALCIUM: CPT | Performed by: INTERNAL MEDICINE

## 2023-01-01 PROCEDURE — 87040 BLOOD CULTURE FOR BACTERIA: CPT

## 2023-01-01 PROCEDURE — 73630 X-RAY EXAM OF FOOT: CPT | Mod: RT

## 2023-01-01 PROCEDURE — 71046 X-RAY EXAM CHEST 2 VIEWS: CPT | Mod: 26 | Performed by: RADIOLOGY

## 2023-01-01 PROCEDURE — 99233 SBSQ HOSP IP/OBS HIGH 50: CPT | Performed by: INTERNAL MEDICINE

## 2023-01-01 PROCEDURE — 250N000009 HC RX 250: Performed by: SURGERY

## 2023-01-01 PROCEDURE — 82565 ASSAY OF CREATININE: CPT | Performed by: PATHOLOGY

## 2023-01-01 PROCEDURE — 84165 PROTEIN E-PHORESIS SERUM: CPT

## 2023-01-01 PROCEDURE — 80197 ASSAY OF TACROLIMUS: CPT | Performed by: EMERGENCY MEDICINE

## 2023-01-01 PROCEDURE — 99223 1ST HOSP IP/OBS HIGH 75: CPT | Mod: 24 | Performed by: INTERNAL MEDICINE

## 2023-01-01 PROCEDURE — 99406 BEHAV CHNG SMOKING 3-10 MIN: CPT

## 2023-01-01 PROCEDURE — 85025 COMPLETE CBC W/AUTO DIFF WBC: CPT | Performed by: PATHOLOGY

## 2023-01-01 PROCEDURE — 93306 TTE W/DOPPLER COMPLETE: CPT | Mod: 26 | Performed by: STUDENT IN AN ORGANIZED HEALTH CARE EDUCATION/TRAINING PROGRAM

## 2023-01-01 PROCEDURE — 83690 ASSAY OF LIPASE: CPT | Performed by: PATHOLOGY

## 2023-01-01 PROCEDURE — 85027 COMPLETE CBC AUTOMATED: CPT | Performed by: SURGERY

## 2023-01-01 PROCEDURE — G0463 HOSPITAL OUTPT CLINIC VISIT: HCPCS | Performed by: PHYSICIAN ASSISTANT

## 2023-01-01 PROCEDURE — 70450 CT HEAD/BRAIN W/O DYE: CPT

## 2023-01-01 PROCEDURE — 80197 ASSAY OF TACROLIMUS: CPT

## 2023-01-01 PROCEDURE — 96365 THER/PROPH/DIAG IV INF INIT: CPT | Performed by: FAMILY MEDICINE

## 2023-01-01 PROCEDURE — 96375 TX/PRO/DX INJ NEW DRUG ADDON: CPT | Performed by: FAMILY MEDICINE

## 2023-01-01 PROCEDURE — 93325 DOPPLER ECHO COLOR FLOW MAPG: CPT

## 2023-01-01 PROCEDURE — 93005 ELECTROCARDIOGRAM TRACING: CPT | Performed by: FAMILY MEDICINE

## 2023-01-01 PROCEDURE — 999N000033 HC STATISTIC CHRONIC PULMONARY DISEASE SPECIALIST

## 2023-01-01 PROCEDURE — 82533 TOTAL CORTISOL: CPT | Performed by: INTERNAL MEDICINE

## 2023-01-01 PROCEDURE — 93971 EXTREMITY STUDY: CPT | Mod: RT

## 2023-01-01 PROCEDURE — 99284 EMERGENCY DEPT VISIT MOD MDM: CPT | Mod: 25

## 2023-01-01 PROCEDURE — 710N000010 HC RECOVERY PHASE 1, LEVEL 2, PER MIN: Performed by: SURGERY

## 2023-01-01 PROCEDURE — 250N000009 HC RX 250: Performed by: INTERNAL MEDICINE

## 2023-01-01 PROCEDURE — 999N000032 HC STATISTIC CHRONIC DISEASE SPECIALIST RT CONSULT

## 2023-01-01 PROCEDURE — 87507 IADNA-DNA/RNA PROBE TQ 12-25: CPT

## 2023-01-01 PROCEDURE — 250N000009 HC RX 250: Performed by: EMERGENCY MEDICINE

## 2023-01-01 PROCEDURE — 93321 DOPPLER ECHO F-UP/LMTD STD: CPT

## 2023-01-01 PROCEDURE — 99214 OFFICE O/P EST MOD 30 MIN: CPT | Performed by: NURSE PRACTITIONER

## 2023-01-01 PROCEDURE — 85049 AUTOMATED PLATELET COUNT: CPT

## 2023-01-01 PROCEDURE — 99222 1ST HOSP IP/OBS MODERATE 55: CPT | Performed by: INTERNAL MEDICINE

## 2023-01-01 PROCEDURE — 36415 COLL VENOUS BLD VENIPUNCTURE: CPT | Performed by: FAMILY MEDICINE

## 2023-01-01 PROCEDURE — 99232 SBSQ HOSP IP/OBS MODERATE 35: CPT | Performed by: INTERNAL MEDICINE

## 2023-01-01 PROCEDURE — 87328 CRYPTOSPORIDIUM AG IA: CPT | Performed by: PHYSICIAN ASSISTANT

## 2023-01-01 PROCEDURE — 255N000002 HC RX 255 OP 636: Performed by: STUDENT IN AN ORGANIZED HEALTH CARE EDUCATION/TRAINING PROGRAM

## 2023-01-01 PROCEDURE — 272N000001 HC OR GENERAL SUPPLY STERILE: Performed by: SURGERY

## 2023-01-01 PROCEDURE — 83550 IRON BINDING TEST: CPT

## 2023-01-01 PROCEDURE — 87637 SARSCOV2&INF A&B&RSV AMP PRB: CPT | Performed by: FAMILY MEDICINE

## 2023-01-01 PROCEDURE — 99215 OFFICE O/P EST HI 40 MIN: CPT | Performed by: PHYSICIAN ASSISTANT

## 2023-01-01 PROCEDURE — 82610 CYSTATIN C: CPT

## 2023-01-01 PROCEDURE — 93970 EXTREMITY STUDY: CPT | Mod: XS

## 2023-01-01 PROCEDURE — 82310 ASSAY OF CALCIUM: CPT

## 2023-01-01 PROCEDURE — 80048 BASIC METABOLIC PNL TOTAL CA: CPT | Performed by: PHYSICIAN ASSISTANT

## 2023-01-01 PROCEDURE — 93010 ELECTROCARDIOGRAM REPORT: CPT | Performed by: FAMILY MEDICINE

## 2023-01-01 PROCEDURE — 93005 ELECTROCARDIOGRAM TRACING: CPT | Performed by: EMERGENCY MEDICINE

## 2023-01-01 PROCEDURE — 99213 OFFICE O/P EST LOW 20 MIN: CPT | Performed by: CASE MANAGER/CARE COORDINATOR

## 2023-01-01 PROCEDURE — 99233 SBSQ HOSP IP/OBS HIGH 50: CPT | Performed by: PHYSICIAN ASSISTANT

## 2023-01-01 PROCEDURE — 999N000141 HC STATISTIC PRE-PROCEDURE NURSING ASSESSMENT: Performed by: SURGERY

## 2023-01-01 PROCEDURE — 93975 VASCULAR STUDY: CPT

## 2023-01-01 PROCEDURE — 82803 BLOOD GASES ANY COMBINATION: CPT

## 2023-01-01 PROCEDURE — C9803 HOPD COVID-19 SPEC COLLECT: HCPCS | Performed by: FAMILY MEDICINE

## 2023-01-01 PROCEDURE — 250N000013 HC RX MED GY IP 250 OP 250 PS 637: Performed by: NURSE PRACTITIONER

## 2023-01-01 PROCEDURE — 041K0KH BYPASS RIGHT FEMORAL ARTERY TO RIGHT FEMORAL ARTERY WITH NONAUTOLOGOUS TISSUE SUBSTITUTE, OPEN APPROACH: ICD-10-PCS | Performed by: SURGERY

## 2023-01-01 PROCEDURE — 83735 ASSAY OF MAGNESIUM: CPT | Performed by: PATHOLOGY

## 2023-01-01 PROCEDURE — C1762 CONN TISS, HUMAN(INC FASCIA): HCPCS | Performed by: SURGERY

## 2023-01-01 PROCEDURE — 250N000011 HC RX IP 250 OP 636: Mod: JZ

## 2023-01-01 PROCEDURE — 93970 EXTREMITY STUDY: CPT

## 2023-01-01 PROCEDURE — 93970 EXTREMITY STUDY: CPT | Mod: 26 | Performed by: RADIOLOGY

## 2023-01-01 PROCEDURE — 99222 1ST HOSP IP/OBS MODERATE 55: CPT | Mod: GC | Performed by: SURGERY

## 2023-01-01 PROCEDURE — 83735 ASSAY OF MAGNESIUM: CPT | Performed by: STUDENT IN AN ORGANIZED HEALTH CARE EDUCATION/TRAINING PROGRAM

## 2023-01-01 PROCEDURE — 35661 BPG FEMORAL-FEMORAL: CPT | Mod: RT | Performed by: SURGERY

## 2023-01-01 PROCEDURE — 87493 C DIFF AMPLIFIED PROBE: CPT

## 2023-01-01 PROCEDURE — 272N000202 HC AEROBIKA WITH MANOMETER

## 2023-01-01 PROCEDURE — 258N000003 HC RX IP 258 OP 636: Performed by: SURGERY

## 2023-01-01 PROCEDURE — 999N000208 ECHOCARDIOGRAM COMPLETE

## 2023-01-01 PROCEDURE — 83521 IG LIGHT CHAINS FREE EACH: CPT | Mod: 90 | Performed by: PATHOLOGY

## 2023-01-01 PROCEDURE — 71045 X-RAY EXAM CHEST 1 VIEW: CPT | Mod: 26 | Performed by: RADIOLOGY

## 2023-01-01 PROCEDURE — 83880 ASSAY OF NATRIURETIC PEPTIDE: CPT | Performed by: FAMILY MEDICINE

## 2023-01-01 PROCEDURE — 86140 C-REACTIVE PROTEIN: CPT | Performed by: FAMILY MEDICINE

## 2023-01-01 PROCEDURE — 82248 BILIRUBIN DIRECT: CPT | Performed by: STUDENT IN AN ORGANIZED HEALTH CARE EDUCATION/TRAINING PROGRAM

## 2023-01-01 PROCEDURE — 86850 RBC ANTIBODY SCREEN: CPT

## 2023-01-01 PROCEDURE — 99233 SBSQ HOSP IP/OBS HIGH 50: CPT | Performed by: STUDENT IN AN ORGANIZED HEALTH CARE EDUCATION/TRAINING PROGRAM

## 2023-01-01 PROCEDURE — 99214 OFFICE O/P EST MOD 30 MIN: CPT | Mod: VID | Performed by: FAMILY MEDICINE

## 2023-01-01 PROCEDURE — 84100 ASSAY OF PHOSPHORUS: CPT | Performed by: EMERGENCY MEDICINE

## 2023-01-01 PROCEDURE — 96375 TX/PRO/DX INJ NEW DRUG ADDON: CPT

## 2023-01-01 PROCEDURE — 99204 OFFICE O/P NEW MOD 45 MIN: CPT | Mod: VID | Performed by: INTERNAL MEDICINE

## 2023-01-01 PROCEDURE — 80180 DRUG SCRN QUAN MYCOPHENOLATE: CPT | Performed by: INTERNAL MEDICINE

## 2023-01-01 PROCEDURE — 96365 THER/PROPH/DIAG IV INF INIT: CPT

## 2023-01-01 PROCEDURE — 96374 THER/PROPH/DIAG INJ IV PUSH: CPT

## 2023-01-01 PROCEDURE — 250N000011 HC RX IP 250 OP 636: Mod: JZ | Performed by: EMERGENCY MEDICINE

## 2023-01-01 PROCEDURE — 87529 HSV DNA AMP PROBE: CPT | Performed by: STUDENT IN AN ORGANIZED HEALTH CARE EDUCATION/TRAINING PROGRAM

## 2023-01-01 PROCEDURE — 97110 THERAPEUTIC EXERCISES: CPT | Mod: GP

## 2023-01-01 PROCEDURE — 94375 RESPIRATORY FLOW VOLUME LOOP: CPT | Performed by: INTERNAL MEDICINE

## 2023-01-01 PROCEDURE — 99231 SBSQ HOSP IP/OBS SF/LOW 25: CPT | Performed by: INTERNAL MEDICINE

## 2023-01-01 PROCEDURE — 96360 HYDRATION IV INFUSION INIT: CPT | Performed by: FAMILY MEDICINE

## 2023-01-01 PROCEDURE — 93000 ELECTROCARDIOGRAM COMPLETE: CPT | Performed by: FAMILY MEDICINE

## 2023-01-01 PROCEDURE — 99222 1ST HOSP IP/OBS MODERATE 55: CPT | Mod: 24 | Performed by: INTERNAL MEDICINE

## 2023-01-01 PROCEDURE — 85027 COMPLETE CBC AUTOMATED: CPT | Performed by: FAMILY MEDICINE

## 2023-01-01 PROCEDURE — 94640 AIRWAY INHALATION TREATMENT: CPT | Mod: 76

## 2023-01-01 PROCEDURE — 86832 HLA CLASS I HIGH DEFIN QUAL: CPT | Performed by: STUDENT IN AN ORGANIZED HEALTH CARE EDUCATION/TRAINING PROGRAM

## 2023-01-01 PROCEDURE — 80048 BASIC METABOLIC PNL TOTAL CA: CPT | Performed by: INTERNAL MEDICINE

## 2023-01-01 PROCEDURE — 84443 ASSAY THYROID STIM HORMONE: CPT | Performed by: PATHOLOGY

## 2023-01-01 PROCEDURE — 250N000013 HC RX MED GY IP 250 OP 250 PS 637: Performed by: PHYSICIAN ASSISTANT

## 2023-01-01 PROCEDURE — 99214 OFFICE O/P EST MOD 30 MIN: CPT | Mod: VID | Performed by: SURGERY

## 2023-01-01 PROCEDURE — 85379 FIBRIN DEGRADATION QUANT: CPT | Performed by: FAMILY MEDICINE

## 2023-01-01 PROCEDURE — 258N000003 HC RX IP 258 OP 636: Performed by: EMERGENCY MEDICINE

## 2023-01-01 PROCEDURE — 99214 OFFICE O/P EST MOD 30 MIN: CPT | Mod: VID | Performed by: PHYSICIAN ASSISTANT

## 2023-01-01 PROCEDURE — 250N000011 HC RX IP 250 OP 636: Performed by: INTERNAL MEDICINE

## 2023-01-01 PROCEDURE — 93005 ELECTROCARDIOGRAM TRACING: CPT

## 2023-01-01 PROCEDURE — 85027 COMPLETE CBC AUTOMATED: CPT | Performed by: INTERNAL MEDICINE

## 2023-01-01 PROCEDURE — 74283 THER NMA RDCTJ INTUS/OBSTRCJ: CPT

## 2023-01-01 PROCEDURE — 99215 OFFICE O/P EST HI 40 MIN: CPT | Performed by: FAMILY MEDICINE

## 2023-01-01 PROCEDURE — 99024 POSTOP FOLLOW-UP VISIT: CPT | Mod: 95 | Performed by: SURGERY

## 2023-01-01 PROCEDURE — 99207 PR NO CHARGE LOS: CPT | Performed by: INTERNAL MEDICINE

## 2023-01-01 PROCEDURE — P9016 RBC LEUKOCYTES REDUCED: HCPCS

## 2023-01-01 PROCEDURE — 99442 PR PHYSICIAN TELEPHONE EVALUATION 11-20 MIN: CPT | Mod: 95 | Performed by: FAMILY MEDICINE

## 2023-01-01 PROCEDURE — 360N000078 HC SURGERY LEVEL 5, PER MIN: Performed by: SURGERY

## 2023-01-01 DEVICE — GRAFT TISS 4MM-5MM DIA 21-29 CM FEM POPLITEAL ART STRL R020: Type: IMPLANTABLE DEVICE | Site: GROIN | Status: FUNCTIONAL

## 2023-01-01 RX ORDER — HYDRALAZINE HYDROCHLORIDE 25 MG/1
50 TABLET, FILM COATED ORAL 3 TIMES DAILY
Status: DISCONTINUED | OUTPATIENT
Start: 2023-01-01 | End: 2023-01-01

## 2023-01-01 RX ORDER — CEFAZOLIN SODIUM 2 G/50ML
2 SOLUTION INTRAVENOUS
Status: CANCELLED | OUTPATIENT
Start: 2023-01-01

## 2023-01-01 RX ORDER — ONDANSETRON 2 MG/ML
4 INJECTION INTRAMUSCULAR; INTRAVENOUS EVERY 6 HOURS PRN
Status: DISCONTINUED | OUTPATIENT
Start: 2023-01-01 | End: 2023-01-01 | Stop reason: HOSPADM

## 2023-01-01 RX ORDER — HYDRALAZINE HYDROCHLORIDE 100 MG/1
100 TABLET, FILM COATED ORAL 3 TIMES DAILY
Start: 2023-01-01 | End: 2023-01-01

## 2023-01-01 RX ORDER — LACTOBACILLUS RHAMNOSUS GG 10B CELL
CAPSULE ORAL
Qty: 90 CAPSULE | Refills: 3 | Status: SHIPPED | OUTPATIENT
Start: 2023-01-01

## 2023-01-01 RX ORDER — NALOXONE HYDROCHLORIDE 0.4 MG/ML
0.4 INJECTION, SOLUTION INTRAMUSCULAR; INTRAVENOUS; SUBCUTANEOUS
Status: DISCONTINUED | OUTPATIENT
Start: 2023-01-01 | End: 2023-01-01 | Stop reason: HOSPADM

## 2023-01-01 RX ORDER — LIDOCAINE 40 MG/G
CREAM TOPICAL
Status: DISCONTINUED | OUTPATIENT
Start: 2023-01-01 | End: 2023-01-01 | Stop reason: HOSPADM

## 2023-01-01 RX ORDER — PANTOPRAZOLE SODIUM 40 MG/1
40 TABLET, DELAYED RELEASE ORAL
Status: DISCONTINUED | OUTPATIENT
Start: 2023-01-01 | End: 2023-01-01 | Stop reason: HOSPADM

## 2023-01-01 RX ORDER — AMOXICILLIN 250 MG
1 CAPSULE ORAL 2 TIMES DAILY
Status: DISCONTINUED | OUTPATIENT
Start: 2023-01-01 | End: 2023-01-01 | Stop reason: HOSPADM

## 2023-01-01 RX ORDER — IPRATROPIUM BROMIDE AND ALBUTEROL 20; 100 UG/1; UG/1
1 SPRAY, METERED RESPIRATORY (INHALATION) 4 TIMES DAILY
COMMUNITY
End: 2023-01-01

## 2023-01-01 RX ORDER — LISINOPRIL 2.5 MG/1
2.5 TABLET ORAL DAILY
Status: DISCONTINUED | OUTPATIENT
Start: 2023-01-01 | End: 2023-01-01 | Stop reason: HOSPADM

## 2023-01-01 RX ORDER — CLOPIDOGREL BISULFATE 75 MG/1
75 TABLET ORAL DAILY
Status: DISCONTINUED | OUTPATIENT
Start: 2023-01-01 | End: 2023-01-01 | Stop reason: HOSPADM

## 2023-01-01 RX ORDER — OXYCODONE HYDROCHLORIDE 5 MG/1
5 TABLET ORAL
Status: DISCONTINUED | OUTPATIENT
Start: 2023-01-01 | End: 2023-01-01 | Stop reason: HOSPADM

## 2023-01-01 RX ORDER — ONDANSETRON 2 MG/ML
4 INJECTION INTRAMUSCULAR; INTRAVENOUS EVERY 30 MIN PRN
Status: DISCONTINUED | OUTPATIENT
Start: 2023-01-01 | End: 2023-01-01 | Stop reason: HOSPADM

## 2023-01-01 RX ORDER — HYDRALAZINE HYDROCHLORIDE 25 MG/1
25 TABLET, FILM COATED ORAL EVERY 8 HOURS SCHEDULED
Status: DISCONTINUED | OUTPATIENT
Start: 2023-01-01 | End: 2023-01-01

## 2023-01-01 RX ORDER — ASPIRIN 81 MG/1
TABLET, CHEWABLE ORAL
Qty: 90 TABLET | Refills: 3 | Status: SHIPPED | OUTPATIENT
Start: 2023-01-01

## 2023-01-01 RX ORDER — METOPROLOL TARTRATE 50 MG
50 TABLET ORAL EVERY EVENING
Status: DISCONTINUED | OUTPATIENT
Start: 2023-01-01 | End: 2023-01-01 | Stop reason: HOSPADM

## 2023-01-01 RX ORDER — ASPIRIN 81 MG/1
81 TABLET, CHEWABLE ORAL DAILY
Status: DISCONTINUED | OUTPATIENT
Start: 2023-01-01 | End: 2023-01-01 | Stop reason: HOSPADM

## 2023-01-01 RX ORDER — TORSEMIDE 10 MG/1
10 TABLET ORAL DAILY
Status: DISCONTINUED | OUTPATIENT
Start: 2023-01-01 | End: 2023-01-01 | Stop reason: HOSPADM

## 2023-01-01 RX ORDER — HYDRALAZINE HYDROCHLORIDE 25 MG/1
25 TABLET, FILM COATED ORAL 3 TIMES DAILY
Status: DISCONTINUED | OUTPATIENT
Start: 2023-01-01 | End: 2023-01-01

## 2023-01-01 RX ORDER — ALBUTEROL SULFATE 90 UG/1
1-2 AEROSOL, METERED RESPIRATORY (INHALATION) EVERY 6 HOURS PRN
Status: DISCONTINUED | OUTPATIENT
Start: 2023-01-01 | End: 2023-01-01 | Stop reason: HOSPADM

## 2023-01-01 RX ORDER — HYDRALAZINE HYDROCHLORIDE 100 MG/1
100 TABLET, FILM COATED ORAL 3 TIMES DAILY
Qty: 90 TABLET | Refills: 5 | Status: SHIPPED | OUTPATIENT
Start: 2023-01-01

## 2023-01-01 RX ORDER — AMOXICILLIN 250 MG
2 CAPSULE ORAL 2 TIMES DAILY
Status: DISCONTINUED | OUTPATIENT
Start: 2023-01-01 | End: 2023-01-01 | Stop reason: HOSPADM

## 2023-01-01 RX ORDER — POLYETHYLENE GLYCOL 3350 17 G/17G
17 POWDER, FOR SOLUTION ORAL DAILY
Status: DISCONTINUED | OUTPATIENT
Start: 2023-01-01 | End: 2023-01-01 | Stop reason: HOSPADM

## 2023-01-01 RX ORDER — NIFEDIPINE 30 MG/1
30 TABLET, EXTENDED RELEASE ORAL ONCE
Status: COMPLETED | OUTPATIENT
Start: 2023-01-01 | End: 2023-01-01

## 2023-01-01 RX ORDER — TACROLIMUS 1 MG/1
2 CAPSULE ORAL 2 TIMES DAILY
Status: DISCONTINUED | OUTPATIENT
Start: 2023-01-01 | End: 2023-01-01

## 2023-01-01 RX ORDER — CEFAZOLIN SODIUM 1 G/3ML
1 INJECTION, POWDER, FOR SOLUTION INTRAMUSCULAR; INTRAVENOUS EVERY 12 HOURS
Status: COMPLETED | OUTPATIENT
Start: 2023-01-01 | End: 2023-01-01

## 2023-01-01 RX ORDER — HYDRALAZINE HYDROCHLORIDE 50 MG/1
50 TABLET, FILM COATED ORAL EVERY 8 HOURS SCHEDULED
Status: DISCONTINUED | OUTPATIENT
Start: 2023-01-01 | End: 2023-01-01

## 2023-01-01 RX ORDER — NALOXONE HYDROCHLORIDE 0.4 MG/ML
0.2 INJECTION, SOLUTION INTRAMUSCULAR; INTRAVENOUS; SUBCUTANEOUS
Status: DISCONTINUED | OUTPATIENT
Start: 2023-01-01 | End: 2023-01-01 | Stop reason: HOSPADM

## 2023-01-01 RX ORDER — AMOXICILLIN 250 MG
1 CAPSULE ORAL 2 TIMES DAILY PRN
Status: DISCONTINUED | OUTPATIENT
Start: 2023-01-01 | End: 2023-01-01 | Stop reason: HOSPADM

## 2023-01-01 RX ORDER — ACETAMINOPHEN 325 MG/1
650 TABLET ORAL EVERY 4 HOURS PRN
Status: DISCONTINUED | OUTPATIENT
Start: 2023-01-01 | End: 2023-01-01 | Stop reason: HOSPADM

## 2023-01-01 RX ORDER — EPHEDRINE SULFATE 50 MG/ML
INJECTION, SOLUTION INTRAMUSCULAR; INTRAVENOUS; SUBCUTANEOUS PRN
Status: DISCONTINUED | OUTPATIENT
Start: 2023-01-01 | End: 2023-01-01

## 2023-01-01 RX ORDER — FENTANYL CITRATE 50 UG/ML
25 INJECTION, SOLUTION INTRAMUSCULAR; INTRAVENOUS EVERY 5 MIN PRN
Status: DISCONTINUED | OUTPATIENT
Start: 2023-01-01 | End: 2023-01-01 | Stop reason: HOSPADM

## 2023-01-01 RX ORDER — SODIUM CHLORIDE, SODIUM LACTATE, POTASSIUM CHLORIDE, CALCIUM CHLORIDE 600; 310; 30; 20 MG/100ML; MG/100ML; MG/100ML; MG/100ML
INJECTION, SOLUTION INTRAVENOUS CONTINUOUS
Status: DISCONTINUED | OUTPATIENT
Start: 2023-01-01 | End: 2023-01-01 | Stop reason: HOSPADM

## 2023-01-01 RX ORDER — HYDROMORPHONE HCL IN WATER/PF 6 MG/30 ML
0.2 PATIENT CONTROLLED ANALGESIA SYRINGE INTRAVENOUS
Status: DISCONTINUED | OUTPATIENT
Start: 2023-01-01 | End: 2023-01-01 | Stop reason: HOSPADM

## 2023-01-01 RX ORDER — METHYLPREDNISOLONE SODIUM SUCCINATE 125 MG/2ML
125 INJECTION, POWDER, LYOPHILIZED, FOR SOLUTION INTRAMUSCULAR; INTRAVENOUS
Status: CANCELLED
Start: 2023-01-01

## 2023-01-01 RX ORDER — TORSEMIDE 10 MG/1
10 TABLET ORAL DAILY PRN
Status: ON HOLD | COMMUNITY
End: 2023-01-01

## 2023-01-01 RX ORDER — CEFAZOLIN SODIUM/WATER 2 G/20 ML
2 SYRINGE (ML) INTRAVENOUS SEE ADMIN INSTRUCTIONS
Status: DISCONTINUED | OUTPATIENT
Start: 2023-01-01 | End: 2023-01-01 | Stop reason: HOSPADM

## 2023-01-01 RX ORDER — HEPARIN SODIUM,PORCINE 10 UNIT/ML
5-20 VIAL (ML) INTRAVENOUS DAILY PRN
Status: CANCELLED | OUTPATIENT
Start: 2023-01-01

## 2023-01-01 RX ORDER — ONDANSETRON 4 MG/1
4 TABLET, ORALLY DISINTEGRATING ORAL EVERY 6 HOURS PRN
Status: DISCONTINUED | OUTPATIENT
Start: 2023-01-01 | End: 2023-01-01 | Stop reason: HOSPADM

## 2023-01-01 RX ORDER — PREDNISONE 5 MG/1
5 TABLET ORAL DAILY
Status: DISCONTINUED | OUTPATIENT
Start: 2023-01-01 | End: 2023-01-01 | Stop reason: HOSPADM

## 2023-01-01 RX ORDER — ONDANSETRON 4 MG/1
4 TABLET, ORALLY DISINTEGRATING ORAL EVERY 30 MIN PRN
Status: DISCONTINUED | OUTPATIENT
Start: 2023-01-01 | End: 2023-01-01 | Stop reason: HOSPADM

## 2023-01-01 RX ORDER — HYDROMORPHONE HCL IN WATER/PF 6 MG/30 ML
0.4 PATIENT CONTROLLED ANALGESIA SYRINGE INTRAVENOUS EVERY 5 MIN PRN
Status: DISCONTINUED | OUTPATIENT
Start: 2023-01-01 | End: 2023-01-01 | Stop reason: HOSPADM

## 2023-01-01 RX ORDER — HYDRALAZINE HYDROCHLORIDE 10 MG/1
10 TABLET, FILM COATED ORAL 3 TIMES DAILY
Status: DISCONTINUED | OUTPATIENT
Start: 2023-01-01 | End: 2023-01-01 | Stop reason: HOSPADM

## 2023-01-01 RX ORDER — ATORVASTATIN CALCIUM 80 MG/1
80 TABLET, FILM COATED ORAL DAILY
Status: DISCONTINUED | OUTPATIENT
Start: 2023-01-01 | End: 2023-01-01 | Stop reason: HOSPADM

## 2023-01-01 RX ORDER — IPRATROPIUM BROMIDE AND ALBUTEROL SULFATE 2.5; .5 MG/3ML; MG/3ML
3 SOLUTION RESPIRATORY (INHALATION) ONCE
Status: COMPLETED | OUTPATIENT
Start: 2023-01-01 | End: 2023-01-01

## 2023-01-01 RX ORDER — DICYCLOMINE HCL 20 MG
20 TABLET ORAL
Status: DISCONTINUED | OUTPATIENT
Start: 2023-01-01 | End: 2023-01-01

## 2023-01-01 RX ORDER — EPINEPHRINE 1 MG/ML
0.3 INJECTION, SOLUTION, CONCENTRATE INTRAVENOUS EVERY 5 MIN PRN
Status: CANCELLED | OUTPATIENT
Start: 2023-01-01

## 2023-01-01 RX ORDER — ACETAMINOPHEN 325 MG/1
650 TABLET ORAL EVERY 6 HOURS PRN
Status: DISCONTINUED | OUTPATIENT
Start: 2023-01-01 | End: 2023-01-01 | Stop reason: HOSPADM

## 2023-01-01 RX ORDER — OXYCODONE HYDROCHLORIDE 5 MG/1
5 TABLET ORAL EVERY 4 HOURS PRN
Status: DISCONTINUED | OUTPATIENT
Start: 2023-01-01 | End: 2023-01-01 | Stop reason: HOSPADM

## 2023-01-01 RX ORDER — HYDROMORPHONE HCL IN WATER/PF 6 MG/30 ML
0.2 PATIENT CONTROLLED ANALGESIA SYRINGE INTRAVENOUS EVERY 5 MIN PRN
Status: DISCONTINUED | OUTPATIENT
Start: 2023-01-01 | End: 2023-01-01 | Stop reason: HOSPADM

## 2023-01-01 RX ORDER — IPRATROPIUM BROMIDE AND ALBUTEROL 20; 100 UG/1; UG/1
1 SPRAY, METERED RESPIRATORY (INHALATION) 4 TIMES DAILY
Qty: 4 G | Refills: 4 | Status: SHIPPED | OUTPATIENT
Start: 2023-01-01

## 2023-01-01 RX ORDER — TACROLIMUS 0.5 MG/1
2 CAPSULE ORAL 2 TIMES DAILY
Qty: 240 CAPSULE | Refills: 11 | Status: SHIPPED | OUTPATIENT
Start: 2023-01-01 | End: 2024-01-01

## 2023-01-01 RX ORDER — MAGNESIUM OXIDE 400 MG/1
400 TABLET ORAL DAILY
Qty: 83 TABLET | Refills: 0 | Status: ON HOLD | OUTPATIENT
Start: 2023-01-01 | End: 2023-01-01

## 2023-01-01 RX ORDER — SIROLIMUS 2 MG/1
TABLET, FILM COATED ORAL
Qty: 30 TABLET | Refills: 11 | Status: ON HOLD
Start: 2023-01-01 | End: 2023-01-01

## 2023-01-01 RX ORDER — PROTAMINE SULFATE 10 MG/ML
INJECTION, SOLUTION INTRAVENOUS PRN
Status: DISCONTINUED | OUTPATIENT
Start: 2023-01-01 | End: 2023-01-01

## 2023-01-01 RX ORDER — NUT TX, LACT-REDUCED, IRON 0.09G-2.25
8 LIQUID (ML) ORAL 2 TIMES DAILY
Qty: 960 ML | Refills: 0 | Status: SHIPPED | OUTPATIENT
Start: 2023-01-01 | End: 2023-01-01

## 2023-01-01 RX ORDER — TACROLIMUS 0.5 MG/1
1.5 CAPSULE ORAL 2 TIMES DAILY
Qty: 180 CAPSULE | Refills: 1 | Status: SHIPPED | OUTPATIENT
Start: 2023-01-01 | End: 2023-01-01

## 2023-01-01 RX ORDER — HEPARIN SODIUM (PORCINE) LOCK FLUSH IV SOLN 100 UNIT/ML 100 UNIT/ML
5 SOLUTION INTRAVENOUS
Status: CANCELLED | OUTPATIENT
Start: 2023-01-01

## 2023-01-01 RX ORDER — HYDROMORPHONE HYDROCHLORIDE 1 MG/ML
0.5 INJECTION, SOLUTION INTRAMUSCULAR; INTRAVENOUS; SUBCUTANEOUS ONCE
Status: COMPLETED | OUTPATIENT
Start: 2023-01-01 | End: 2023-01-01

## 2023-01-01 RX ORDER — HYDRALAZINE HYDROCHLORIDE 10 MG/1
10 TABLET, FILM COATED ORAL 3 TIMES DAILY
Status: DISCONTINUED | OUTPATIENT
Start: 2023-01-01 | End: 2023-01-01

## 2023-01-01 RX ORDER — AMOXICILLIN 250 MG
2 CAPSULE ORAL 2 TIMES DAILY PRN
Status: DISCONTINUED | OUTPATIENT
Start: 2023-01-01 | End: 2023-01-01 | Stop reason: HOSPADM

## 2023-01-01 RX ORDER — ISOSORBIDE MONONITRATE 60 MG/1
60 TABLET, EXTENDED RELEASE ORAL DAILY
Status: DISCONTINUED | OUTPATIENT
Start: 2023-01-01 | End: 2023-01-01 | Stop reason: HOSPADM

## 2023-01-01 RX ORDER — MAGNESIUM OXIDE 400 MG/1
400 TABLET ORAL DAILY
Status: DISCONTINUED | OUTPATIENT
Start: 2023-01-01 | End: 2023-01-01

## 2023-01-01 RX ORDER — ALBUTEROL SULFATE 90 UG/1
1-2 AEROSOL, METERED RESPIRATORY (INHALATION) EVERY 6 HOURS PRN
Qty: 18 G | Refills: 2 | Status: SHIPPED | OUTPATIENT
Start: 2023-01-01

## 2023-01-01 RX ORDER — MAGNESIUM SULFATE HEPTAHYDRATE 40 MG/ML
2 INJECTION, SOLUTION INTRAVENOUS ONCE
Status: COMPLETED | OUTPATIENT
Start: 2023-01-01 | End: 2023-01-01

## 2023-01-01 RX ORDER — ACETAMINOPHEN 500 MG
1000 TABLET ORAL AT BEDTIME
Status: DISCONTINUED | OUTPATIENT
Start: 2023-01-01 | End: 2023-01-01 | Stop reason: HOSPADM

## 2023-01-01 RX ORDER — METOPROLOL TARTRATE 25 MG/1
100 TABLET, FILM COATED ORAL DAILY
Status: DISCONTINUED | OUTPATIENT
Start: 2023-01-01 | End: 2023-01-01 | Stop reason: HOSPADM

## 2023-01-01 RX ORDER — OXYCODONE HYDROCHLORIDE 5 MG/1
5 TABLET ORAL EVERY 6 HOURS PRN
Qty: 12 TABLET | Refills: 0 | Status: SHIPPED | OUTPATIENT
Start: 2023-01-01 | End: 2023-01-01

## 2023-01-01 RX ORDER — SODIUM CHLORIDE, SODIUM LACTATE, POTASSIUM CHLORIDE, CALCIUM CHLORIDE 600; 310; 30; 20 MG/100ML; MG/100ML; MG/100ML; MG/100ML
INJECTION, SOLUTION INTRAVENOUS CONTINUOUS
Status: DISCONTINUED | OUTPATIENT
Start: 2023-01-01 | End: 2023-01-01

## 2023-01-01 RX ORDER — HYDRALAZINE HYDROCHLORIDE 50 MG/1
100 TABLET, FILM COATED ORAL EVERY 8 HOURS SCHEDULED
Status: DISCONTINUED | OUTPATIENT
Start: 2023-01-01 | End: 2023-01-01 | Stop reason: HOSPADM

## 2023-01-01 RX ORDER — CALCIUM CARBONATE/VITAMIN D3 600 MG-10
TABLET ORAL
Qty: 120 TABLET | Refills: 0 | Status: SHIPPED | OUTPATIENT
Start: 2023-01-01 | End: 2024-01-01

## 2023-01-01 RX ORDER — MAGNESIUM SULFATE HEPTAHYDRATE 500 MG/ML
2 INJECTION, SOLUTION INTRAMUSCULAR; INTRAVENOUS ONCE
Status: DISCONTINUED | OUTPATIENT
Start: 2023-01-01 | End: 2023-01-01

## 2023-01-01 RX ORDER — ALBUTEROL SULFATE 0.83 MG/ML
2.5 SOLUTION RESPIRATORY (INHALATION)
Status: CANCELLED | OUTPATIENT
Start: 2023-01-01

## 2023-01-01 RX ORDER — HYDRALAZINE HYDROCHLORIDE 10 MG/1
TABLET, FILM COATED ORAL
Qty: 270 TABLET | Refills: 0 | Status: ON HOLD | OUTPATIENT
Start: 2023-01-01 | End: 2023-01-01

## 2023-01-01 RX ORDER — PREDNISONE 5 MG/1
5 TABLET ORAL DAILY
Qty: 90 TABLET | Refills: 3 | Status: SHIPPED | OUTPATIENT
Start: 2023-01-01

## 2023-01-01 RX ORDER — HYDRALAZINE HYDROCHLORIDE 10 MG/1
10 TABLET, FILM COATED ORAL 3 TIMES DAILY
Qty: 270 TABLET | Refills: 0 | Status: ON HOLD | OUTPATIENT
Start: 2023-01-01 | End: 2023-01-01

## 2023-01-01 RX ORDER — DEXAMETHASONE SODIUM PHOSPHATE 10 MG/ML
10 INJECTION, SOLUTION INTRAMUSCULAR; INTRAVENOUS ONCE
Status: COMPLETED | OUTPATIENT
Start: 2023-01-01 | End: 2023-01-01

## 2023-01-01 RX ORDER — ONDANSETRON 4 MG/1
4 TABLET, ORALLY DISINTEGRATING ORAL EVERY 8 HOURS PRN
Qty: 30 TABLET | Refills: 1 | Status: SHIPPED | OUTPATIENT
Start: 2023-01-01

## 2023-01-01 RX ORDER — CEFAZOLIN SODIUM/WATER 2 G/20 ML
2 SYRINGE (ML) INTRAVENOUS
Status: COMPLETED | OUTPATIENT
Start: 2023-01-01 | End: 2023-01-01

## 2023-01-01 RX ORDER — HYDRALAZINE HYDROCHLORIDE 25 MG/1
100 TABLET, FILM COATED ORAL 3 TIMES DAILY
Status: DISCONTINUED | OUTPATIENT
Start: 2023-01-01 | End: 2023-01-01 | Stop reason: HOSPADM

## 2023-01-01 RX ORDER — SIROLIMUS 2 MG/1
2 TABLET, FILM COATED ORAL DAILY
Qty: 30 TABLET | Refills: 11 | Status: SHIPPED | OUTPATIENT
Start: 2023-01-01 | End: 2023-01-01

## 2023-01-01 RX ORDER — ASPIRIN 81 MG
TABLET,CHEWABLE ORAL
Qty: 90 TABLET | Refills: 0 | Status: SHIPPED | OUTPATIENT
Start: 2023-01-01 | End: 2023-01-01

## 2023-01-01 RX ORDER — FENTANYL CITRATE 50 UG/ML
50 INJECTION, SOLUTION INTRAMUSCULAR; INTRAVENOUS EVERY 5 MIN PRN
Status: DISCONTINUED | OUTPATIENT
Start: 2023-01-01 | End: 2023-01-01 | Stop reason: HOSPADM

## 2023-01-01 RX ORDER — NICARDIPINE HCL-0.9% SOD CHLOR 1 MG/10 ML
SYRINGE (ML) INTRAVENOUS PRN
Status: DISCONTINUED | OUTPATIENT
Start: 2023-01-01 | End: 2023-01-01

## 2023-01-01 RX ORDER — ISOSORBIDE MONONITRATE 60 MG/1
60 TABLET, EXTENDED RELEASE ORAL DAILY
Qty: 90 TABLET | Refills: 2 | Status: SHIPPED | OUTPATIENT
Start: 2023-01-01

## 2023-01-01 RX ORDER — CEFAZOLIN SODIUM 2 G/50ML
2 SOLUTION INTRAVENOUS SEE ADMIN INSTRUCTIONS
Status: CANCELLED | OUTPATIENT
Start: 2023-01-01

## 2023-01-01 RX ORDER — MEPERIDINE HYDROCHLORIDE 25 MG/ML
25 INJECTION INTRAMUSCULAR; INTRAVENOUS; SUBCUTANEOUS EVERY 30 MIN PRN
Status: CANCELLED | OUTPATIENT
Start: 2023-01-01

## 2023-01-01 RX ORDER — MAGNESIUM OXIDE 400 MG/1
400 TABLET ORAL DAILY
Status: DISCONTINUED | OUTPATIENT
Start: 2023-01-01 | End: 2023-01-01 | Stop reason: HOSPADM

## 2023-01-01 RX ORDER — CALCIUM CARBONATE/VITAMIN D3 600 MG-10
1 TABLET ORAL 2 TIMES DAILY
Status: DISCONTINUED | OUTPATIENT
Start: 2023-01-01 | End: 2023-01-01 | Stop reason: HOSPADM

## 2023-01-01 RX ORDER — ONDANSETRON 2 MG/ML
4 INJECTION INTRAMUSCULAR; INTRAVENOUS ONCE
Status: COMPLETED | OUTPATIENT
Start: 2023-01-01 | End: 2023-01-01

## 2023-01-01 RX ORDER — MULTIVITAMIN,THERAPEUTIC
1 TABLET ORAL DAILY
Status: DISCONTINUED | OUTPATIENT
Start: 2023-01-01 | End: 2023-01-01 | Stop reason: HOSPADM

## 2023-01-01 RX ORDER — TORSEMIDE 10 MG/1
10 TABLET ORAL DAILY
Qty: 90 TABLET | Refills: 1 | Status: SHIPPED | OUTPATIENT
Start: 2023-01-01 | End: 2023-01-01

## 2023-01-01 RX ORDER — HEPARIN SODIUM 5000 [USP'U]/.5ML
5000 INJECTION, SOLUTION INTRAVENOUS; SUBCUTANEOUS EVERY 12 HOURS
Status: DISCONTINUED | OUTPATIENT
Start: 2023-01-01 | End: 2023-01-01 | Stop reason: HOSPADM

## 2023-01-01 RX ORDER — METOPROLOL TARTRATE 50 MG
100 TABLET ORAL DAILY
Status: DISCONTINUED | OUTPATIENT
Start: 2023-01-01 | End: 2023-01-01 | Stop reason: HOSPADM

## 2023-01-01 RX ORDER — FLUTICASONE PROPIONATE 50 MCG
1 SPRAY, SUSPENSION (ML) NASAL DAILY
Status: DISCONTINUED | OUTPATIENT
Start: 2023-01-01 | End: 2023-01-01 | Stop reason: HOSPADM

## 2023-01-01 RX ORDER — ACETAMINOPHEN 325 MG/1
650 TABLET ORAL EVERY 6 HOURS PRN
Status: DISCONTINUED | OUTPATIENT
Start: 2023-01-01 | End: 2023-01-01

## 2023-01-01 RX ORDER — SODIUM CHLORIDE 9 MG/ML
INJECTION, SOLUTION INTRAVENOUS CONTINUOUS
Status: DISCONTINUED | OUTPATIENT
Start: 2023-01-01 | End: 2023-01-01

## 2023-01-01 RX ORDER — LISINOPRIL 2.5 MG/1
2.5 TABLET ORAL DAILY
Qty: 90 TABLET | Refills: 3 | Status: ON HOLD
Start: 2023-01-01 | End: 2023-01-01

## 2023-01-01 RX ORDER — TORSEMIDE 10 MG/1
10 TABLET ORAL DAILY
Qty: 135 TABLET | Refills: 1 | Status: ON HOLD | OUTPATIENT
Start: 2023-01-01 | End: 2023-01-01

## 2023-01-01 RX ORDER — TACROLIMUS 1 MG/1
2 CAPSULE ORAL
Status: DISCONTINUED | OUTPATIENT
Start: 2023-01-01 | End: 2023-01-01 | Stop reason: HOSPADM

## 2023-01-01 RX ORDER — DOXYCYCLINE 100 MG/1
100 CAPSULE ORAL EVERY 12 HOURS SCHEDULED
Status: CANCELLED | OUTPATIENT
Start: 2023-01-01

## 2023-01-01 RX ORDER — DIPHENHYDRAMINE HYDROCHLORIDE 50 MG/ML
50 INJECTION INTRAMUSCULAR; INTRAVENOUS
Status: CANCELLED
Start: 2023-01-01

## 2023-01-01 RX ORDER — HEPARIN SODIUM 5000 [USP'U]/.5ML
5000 INJECTION, SOLUTION INTRAVENOUS; SUBCUTANEOUS EVERY 12 HOURS
Status: DISCONTINUED | OUTPATIENT
Start: 2023-01-01 | End: 2023-01-01

## 2023-01-01 RX ORDER — CALCIUM CARBONATE/VITAMIN D3 600 MG-10
TABLET ORAL
Qty: 120 TABLET | Refills: 0 | Status: SHIPPED | OUTPATIENT
Start: 2023-01-01 | End: 2023-01-01

## 2023-01-01 RX ORDER — METOPROLOL TARTRATE 100 MG
100 TABLET ORAL DAILY
Qty: 90 TABLET | Refills: 3 | Status: SHIPPED | OUTPATIENT
Start: 2023-01-01

## 2023-01-01 RX ORDER — BISACODYL 10 MG
10 SUPPOSITORY, RECTAL RECTAL DAILY PRN
Status: DISCONTINUED | OUTPATIENT
Start: 2023-01-01 | End: 2023-01-01 | Stop reason: HOSPADM

## 2023-01-01 RX ORDER — PROCHLORPERAZINE MALEATE 5 MG
5 TABLET ORAL EVERY 6 HOURS PRN
Status: DISCONTINUED | OUTPATIENT
Start: 2023-01-01 | End: 2023-01-01 | Stop reason: HOSPADM

## 2023-01-01 RX ORDER — TORSEMIDE 10 MG/1
10 TABLET ORAL ONCE
Status: COMPLETED | OUTPATIENT
Start: 2023-01-01 | End: 2023-01-01

## 2023-01-01 RX ORDER — HYDRALAZINE HYDROCHLORIDE 25 MG/1
25 TABLET, FILM COATED ORAL EVERY 8 HOURS PRN
Status: DISCONTINUED | OUTPATIENT
Start: 2023-01-01 | End: 2023-01-01

## 2023-01-01 RX ORDER — ALBUTEROL SULFATE 90 UG/1
1-2 AEROSOL, METERED RESPIRATORY (INHALATION) EVERY 6 HOURS PRN
Qty: 18 G | Refills: 4 | Status: SHIPPED | OUTPATIENT
Start: 2023-01-01 | End: 2023-01-01

## 2023-01-01 RX ORDER — ACETAMINOPHEN 325 MG/1
975 TABLET ORAL EVERY 8 HOURS
Status: DISCONTINUED | OUTPATIENT
Start: 2023-01-01 | End: 2023-01-01 | Stop reason: HOSPADM

## 2023-01-01 RX ORDER — PANTOPRAZOLE SODIUM 40 MG/1
40 TABLET, DELAYED RELEASE ORAL
Qty: 30 TABLET | Refills: 0 | Status: SHIPPED | OUTPATIENT
Start: 2023-01-01 | End: 2023-01-01

## 2023-01-01 RX ORDER — ONDANSETRON 4 MG/1
4 TABLET, ORALLY DISINTEGRATING ORAL EVERY 8 HOURS PRN
Qty: 12 TABLET | Refills: 1 | Status: SHIPPED | OUTPATIENT
Start: 2023-01-01 | End: 2023-01-01

## 2023-01-01 RX ORDER — MAGNESIUM OXIDE 400 MG/1
400 TABLET ORAL 2 TIMES DAILY
Qty: 60 TABLET | Refills: 0 | Status: SHIPPED | OUTPATIENT
Start: 2023-01-01 | End: 2023-01-01

## 2023-01-01 RX ORDER — HYDRALAZINE HYDROCHLORIDE 100 MG/1
100 TABLET, FILM COATED ORAL EVERY 8 HOURS
Qty: 90 TABLET | Refills: 0 | Status: SHIPPED | OUTPATIENT
Start: 2023-01-01 | End: 2023-01-01

## 2023-01-01 RX ORDER — ONDANSETRON 2 MG/ML
INJECTION INTRAMUSCULAR; INTRAVENOUS PRN
Status: DISCONTINUED | OUTPATIENT
Start: 2023-01-01 | End: 2023-01-01

## 2023-01-01 RX ORDER — ATORVASTATIN CALCIUM 80 MG/1
80 TABLET, FILM COATED ORAL EVERY EVENING
Status: DISCONTINUED | OUTPATIENT
Start: 2023-01-01 | End: 2023-01-01 | Stop reason: HOSPADM

## 2023-01-01 RX ORDER — HEPARIN SODIUM 1000 [USP'U]/ML
INJECTION, SOLUTION INTRAVENOUS; SUBCUTANEOUS PRN
Status: DISCONTINUED | OUTPATIENT
Start: 2023-01-01 | End: 2023-01-01

## 2023-01-01 RX ORDER — SIMETHICONE 80 MG
80 TABLET,CHEWABLE ORAL EVERY 6 HOURS PRN
Status: DISCONTINUED | OUTPATIENT
Start: 2023-01-01 | End: 2023-01-01 | Stop reason: HOSPADM

## 2023-01-01 RX ORDER — ACETAMINOPHEN 650 MG/1
650 SUPPOSITORY RECTAL EVERY 6 HOURS PRN
Status: DISCONTINUED | OUTPATIENT
Start: 2023-01-01 | End: 2023-01-01 | Stop reason: HOSPADM

## 2023-01-01 RX ORDER — HEPARIN SODIUM 5000 [USP'U]/.5ML
5000 INJECTION, SOLUTION INTRAVENOUS; SUBCUTANEOUS EVERY 8 HOURS
Status: DISCONTINUED | OUTPATIENT
Start: 2023-01-01 | End: 2023-01-01 | Stop reason: HOSPADM

## 2023-01-01 RX ORDER — PANTOPRAZOLE SODIUM 40 MG/1
40 TABLET, DELAYED RELEASE ORAL DAILY
Status: DISCONTINUED | OUTPATIENT
Start: 2023-01-01 | End: 2023-01-01 | Stop reason: HOSPADM

## 2023-01-01 RX ORDER — LABETALOL HYDROCHLORIDE 5 MG/ML
10 INJECTION, SOLUTION INTRAVENOUS
Status: DISCONTINUED | OUTPATIENT
Start: 2023-01-01 | End: 2023-01-01 | Stop reason: HOSPADM

## 2023-01-01 RX ORDER — HYDRALAZINE HYDROCHLORIDE 20 MG/ML
10 INJECTION INTRAMUSCULAR; INTRAVENOUS ONCE
Status: COMPLETED | OUTPATIENT
Start: 2023-01-01 | End: 2023-01-01

## 2023-01-01 RX ORDER — IOPAMIDOL 755 MG/ML
90 INJECTION, SOLUTION INTRAVASCULAR ONCE
Status: COMPLETED | OUTPATIENT
Start: 2023-01-01 | End: 2023-01-01

## 2023-01-01 RX ORDER — PROPOFOL 10 MG/ML
INJECTION, EMULSION INTRAVENOUS PRN
Status: DISCONTINUED | OUTPATIENT
Start: 2023-01-01 | End: 2023-01-01

## 2023-01-01 RX ORDER — OXYCODONE HYDROCHLORIDE 10 MG/1
10 TABLET ORAL
Status: DISCONTINUED | OUTPATIENT
Start: 2023-01-01 | End: 2023-01-01 | Stop reason: HOSPADM

## 2023-01-01 RX ORDER — CLOPIDOGREL BISULFATE 75 MG/1
75 TABLET ORAL DAILY
Qty: 90 TABLET | Refills: 3 | Status: SHIPPED | OUTPATIENT
Start: 2023-01-01

## 2023-01-01 RX ORDER — HYDROMORPHONE HCL IN WATER/PF 6 MG/30 ML
0.1 PATIENT CONTROLLED ANALGESIA SYRINGE INTRAVENOUS
Status: DISCONTINUED | OUTPATIENT
Start: 2023-01-01 | End: 2023-01-01 | Stop reason: HOSPADM

## 2023-01-01 RX ORDER — HYDRALAZINE HYDROCHLORIDE 10 MG/1
10 TABLET, FILM COATED ORAL 3 TIMES DAILY
Status: ON HOLD | COMMUNITY
End: 2023-01-01

## 2023-01-01 RX ORDER — ALBUTEROL SULFATE 90 UG/1
1-2 AEROSOL, METERED RESPIRATORY (INHALATION)
Status: CANCELLED
Start: 2023-01-01

## 2023-01-01 RX ORDER — VANCOMYCIN HYDROCHLORIDE 125 MG/1
125 CAPSULE ORAL 4 TIMES DAILY
Status: DISCONTINUED | OUTPATIENT
Start: 2023-01-01 | End: 2023-01-01

## 2023-01-01 RX ORDER — ESOMEPRAZOLE MAGNESIUM 40 MG/1
40 CAPSULE, DELAYED RELEASE ORAL
Qty: 90 CAPSULE | Refills: 3 | Status: SHIPPED | OUTPATIENT
Start: 2023-01-01

## 2023-01-01 RX ORDER — MAGNESIUM OXIDE 400 MG/1
400 TABLET ORAL 2 TIMES DAILY
Status: DISCONTINUED | OUTPATIENT
Start: 2023-01-01 | End: 2023-01-01 | Stop reason: HOSPADM

## 2023-01-01 RX ORDER — LISINOPRIL 2.5 MG/1
2.5 TABLET ORAL DAILY
Qty: 90 TABLET | Refills: 3 | Status: ON HOLD | OUTPATIENT
Start: 2023-01-01 | End: 2023-01-01

## 2023-01-01 RX ORDER — ATORVASTATIN CALCIUM 80 MG/1
80 TABLET, FILM COATED ORAL EVERY EVENING
Status: CANCELLED | OUTPATIENT
Start: 2023-01-01

## 2023-01-01 RX ORDER — BUPIVACAINE HYDROCHLORIDE AND EPINEPHRINE 2.5; 5 MG/ML; UG/ML
INJECTION, SOLUTION INFILTRATION; PERINEURAL PRN
Status: DISCONTINUED | OUTPATIENT
Start: 2023-01-01 | End: 2023-01-01 | Stop reason: HOSPADM

## 2023-01-01 RX ORDER — DEXAMETHASONE 2 MG/1
6 TABLET ORAL DAILY
Status: CANCELLED | OUTPATIENT
Start: 2023-01-01

## 2023-01-01 RX ORDER — MAGNESIUM OXIDE 400 MG/1
400 TABLET ORAL 2 TIMES DAILY
Qty: 83 TABLET | Refills: 0 | Status: SHIPPED | OUTPATIENT
Start: 2023-01-01 | End: 2023-01-01

## 2023-01-01 RX ORDER — OXYCODONE HYDROCHLORIDE 5 MG/1
5 TABLET ORAL EVERY 4 HOURS PRN
Qty: 12 TABLET | Refills: 0 | Status: SHIPPED | OUTPATIENT
Start: 2023-01-01

## 2023-01-01 RX ORDER — INFLUENZA A VIRUS A/MICHIGAN/45/2015 X-275 (H1N1) ANTIGEN (FORMALDEHYDE INACTIVATED), INFLUENZA A VIRUS A/SINGAPORE/INFIMH-16-0019/2016 IVR-186 (H3N2) ANTIGEN (FORMALDEHYDE INACTIVATED), INFLUENZA B VIRUS B/PHUKET/3073/2013 ANTIGEN (FORMALDEHYDE INACTIVATED), AND INFLUENZA B VIRUS B/MARYLAND/15/2016 BX-69A ANTIGEN (FORMALDEHYDE INACTIVATED) 60; 60; 60; 60 UG/.7ML; UG/.7ML; UG/.7ML; UG/.7ML
INJECTION, SUSPENSION INTRAMUSCULAR
COMMUNITY
Start: 2022-10-11 | End: 2023-01-01

## 2023-01-01 RX ORDER — IPRATROPIUM BROMIDE AND ALBUTEROL SULFATE 2.5; .5 MG/3ML; MG/3ML
3 SOLUTION RESPIRATORY (INHALATION)
Status: DISCONTINUED | OUTPATIENT
Start: 2023-01-01 | End: 2023-01-01

## 2023-01-01 RX ORDER — ATORVASTATIN CALCIUM 40 MG/1
80 TABLET, FILM COATED ORAL DAILY
Status: DISCONTINUED | OUTPATIENT
Start: 2023-01-01 | End: 2023-01-01 | Stop reason: HOSPADM

## 2023-01-01 RX ORDER — FENTANYL CITRATE 50 UG/ML
INJECTION, SOLUTION INTRAMUSCULAR; INTRAVENOUS PRN
Status: DISCONTINUED | OUTPATIENT
Start: 2023-01-01 | End: 2023-01-01

## 2023-01-01 RX ORDER — ATORVASTATIN CALCIUM 80 MG/1
80 TABLET, FILM COATED ORAL DAILY
Qty: 90 TABLET | Refills: 3 | Status: SHIPPED | OUTPATIENT
Start: 2023-01-01

## 2023-01-01 RX ADMIN — TACROLIMUS 1.5 MG: 1 CAPSULE ORAL at 08:31

## 2023-01-01 RX ADMIN — SUGAMMADEX 100 MG: 100 INJECTION, SOLUTION INTRAVENOUS at 12:45

## 2023-01-01 RX ADMIN — TORSEMIDE 10 MG: 10 TABLET ORAL at 08:41

## 2023-01-01 RX ADMIN — TACROLIMUS 1.5 MG: 1 CAPSULE ORAL at 20:08

## 2023-01-01 RX ADMIN — MAGNESIUM OXIDE TAB 400 MG (241.3 MG ELEMENTAL MG) 400 MG: 400 (241.3 MG) TAB at 08:41

## 2023-01-01 RX ADMIN — ASPIRIN 81 MG 81 MG: 81 TABLET ORAL at 09:07

## 2023-01-01 RX ADMIN — IPRATROPIUM BROMIDE AND ALBUTEROL 1 PUFF: 20; 100 SPRAY, METERED RESPIRATORY (INHALATION) at 16:02

## 2023-01-01 RX ADMIN — TACROLIMUS 1.5 MG: 1 CAPSULE ORAL at 08:34

## 2023-01-01 RX ADMIN — ISOSORBIDE MONONITRATE 60 MG: 60 TABLET, EXTENDED RELEASE ORAL at 08:19

## 2023-01-01 RX ADMIN — ASPIRIN 81 MG 81 MG: 81 TABLET ORAL at 08:31

## 2023-01-01 RX ADMIN — HEPARIN SODIUM 5000 UNITS: 5000 INJECTION, SOLUTION INTRAVENOUS; SUBCUTANEOUS at 21:20

## 2023-01-01 RX ADMIN — TACROLIMUS 2 MG: 1 CAPSULE ORAL at 08:50

## 2023-01-01 RX ADMIN — HYDRALAZINE HYDROCHLORIDE 25 MG: 25 TABLET ORAL at 20:35

## 2023-01-01 RX ADMIN — HYDRALAZINE HYDROCHLORIDE 50 MG: 50 TABLET, FILM COATED ORAL at 21:44

## 2023-01-01 RX ADMIN — TACROLIMUS 2 MG: 1 CAPSULE ORAL at 08:41

## 2023-01-01 RX ADMIN — HEPARIN SODIUM 5000 UNITS: 5000 INJECTION, SOLUTION INTRAVENOUS; SUBCUTANEOUS at 12:38

## 2023-01-01 RX ADMIN — HYDRALAZINE HYDROCHLORIDE 25 MG: 25 TABLET ORAL at 08:56

## 2023-01-01 RX ADMIN — HYDRALAZINE HYDROCHLORIDE 100 MG: 50 TABLET, FILM COATED ORAL at 15:59

## 2023-01-01 RX ADMIN — METOPROLOL TARTRATE 100 MG: 50 TABLET, FILM COATED ORAL at 08:42

## 2023-01-01 RX ADMIN — IPRATROPIUM BROMIDE AND ALBUTEROL 1 PUFF: 20; 100 SPRAY, METERED RESPIRATORY (INHALATION) at 12:55

## 2023-01-01 RX ADMIN — HYDRALAZINE HYDROCHLORIDE 10 MG: 10 TABLET ORAL at 21:06

## 2023-01-01 RX ADMIN — PROTAMINE SULFATE 30 MG: 10 INJECTION, SOLUTION INTRAVENOUS at 11:21

## 2023-01-01 RX ADMIN — ATORVASTATIN CALCIUM 80 MG: 80 TABLET, FILM COATED ORAL at 08:42

## 2023-01-01 RX ADMIN — HYDRALAZINE HYDROCHLORIDE 25 MG: 25 TABLET ORAL at 20:19

## 2023-01-01 RX ADMIN — ONDANSETRON 4 MG: 4 TABLET, ORALLY DISINTEGRATING ORAL at 01:01

## 2023-01-01 RX ADMIN — SODIUM CHLORIDE: 9 INJECTION, SOLUTION INTRAVENOUS at 03:20

## 2023-01-01 RX ADMIN — TACROLIMUS 1.5 MG: 1 CAPSULE ORAL at 08:18

## 2023-01-01 RX ADMIN — IPRATROPIUM BROMIDE AND ALBUTEROL 1 PUFF: 20; 100 SPRAY, METERED RESPIRATORY (INHALATION) at 09:05

## 2023-01-01 RX ADMIN — FENTANYL CITRATE 50 MCG: 50 INJECTION, SOLUTION INTRAMUSCULAR; INTRAVENOUS at 08:46

## 2023-01-01 RX ADMIN — PROPOFOL 50 MG: 10 INJECTION, EMULSION INTRAVENOUS at 08:14

## 2023-01-01 RX ADMIN — CALCIUM CARBONATE 600 MG (1,500 MG)-VITAMIN D3 400 UNIT TABLET 1 TABLET: at 22:41

## 2023-01-01 RX ADMIN — ATORVASTATIN CALCIUM 80 MG: 40 TABLET, FILM COATED ORAL at 08:35

## 2023-01-01 RX ADMIN — DEXAMETHASONE 6 MG: 2 TABLET ORAL at 09:30

## 2023-01-01 RX ADMIN — CALCIUM CARBONATE 600 MG (1,500 MG)-VITAMIN D3 400 UNIT TABLET 1 TABLET: at 20:00

## 2023-01-01 RX ADMIN — HUMAN ALBUMIN MICROSPHERES AND PERFLUTREN 6 ML: 10; .22 INJECTION, SOLUTION INTRAVENOUS at 14:12

## 2023-01-01 RX ADMIN — SIMETHICONE 80 MG: 80 TABLET, CHEWABLE ORAL at 22:55

## 2023-01-01 RX ADMIN — TORSEMIDE 10 MG: 10 TABLET ORAL at 08:03

## 2023-01-01 RX ADMIN — ONDANSETRON 4 MG: 2 INJECTION INTRAMUSCULAR; INTRAVENOUS at 11:51

## 2023-01-01 RX ADMIN — IPRATROPIUM BROMIDE AND ALBUTEROL 1 PUFF: 20; 100 SPRAY, METERED RESPIRATORY (INHALATION) at 13:15

## 2023-01-01 RX ADMIN — SODIUM CHLORIDE: 9 INJECTION, SOLUTION INTRAVENOUS at 13:28

## 2023-01-01 RX ADMIN — SENNOSIDES AND DOCUSATE SODIUM 1 TABLET: 50; 8.6 TABLET ORAL at 20:11

## 2023-01-01 RX ADMIN — TACROLIMUS 2 MG: 1 CAPSULE ORAL at 20:44

## 2023-01-01 RX ADMIN — SENNOSIDES AND DOCUSATE SODIUM 1 TABLET: 50; 8.6 TABLET ORAL at 09:06

## 2023-01-01 RX ADMIN — OXYCODONE HYDROCHLORIDE 5 MG: 5 TABLET ORAL at 02:50

## 2023-01-01 RX ADMIN — TACROLIMUS 2 MG: 1 CAPSULE ORAL at 00:12

## 2023-01-01 RX ADMIN — HYDRALAZINE HYDROCHLORIDE 50 MG: 25 TABLET ORAL at 21:59

## 2023-01-01 RX ADMIN — PANTOPRAZOLE SODIUM 40 MG: 40 TABLET, DELAYED RELEASE ORAL at 08:50

## 2023-01-01 RX ADMIN — MAGNESIUM SULFATE HEPTAHYDRATE 2 G: 40 INJECTION, SOLUTION INTRAVENOUS at 14:58

## 2023-01-01 RX ADMIN — SENNOSIDES AND DOCUSATE SODIUM 1 TABLET: 50; 8.6 TABLET ORAL at 15:59

## 2023-01-01 RX ADMIN — TACROLIMUS 1.5 MG: 1 CAPSULE ORAL at 20:26

## 2023-01-01 RX ADMIN — CLOPIDOGREL BISULFATE 75 MG: 75 TABLET ORAL at 09:31

## 2023-01-01 RX ADMIN — Medication 10 MG: at 09:58

## 2023-01-01 RX ADMIN — HYDRALAZINE HYDROCHLORIDE 50 MG: 25 TABLET ORAL at 08:35

## 2023-01-01 RX ADMIN — ISOSORBIDE MONONITRATE 60 MG: 60 TABLET, EXTENDED RELEASE ORAL at 08:09

## 2023-01-01 RX ADMIN — IPRATROPIUM BROMIDE AND ALBUTEROL SULFATE 3 ML: .5; 3 SOLUTION RESPIRATORY (INHALATION) at 08:03

## 2023-01-01 RX ADMIN — TACROLIMUS 2 MG: 1 CAPSULE ORAL at 17:07

## 2023-01-01 RX ADMIN — MAGNESIUM OXIDE TAB 400 MG (241.3 MG ELEMENTAL MG) 400 MG: 400 (241.3 MG) TAB at 08:50

## 2023-01-01 RX ADMIN — CLOPIDOGREL BISULFATE 75 MG: 75 TABLET ORAL at 08:53

## 2023-01-01 RX ADMIN — IPRATROPIUM BROMIDE AND ALBUTEROL 1 PUFF: 20; 100 SPRAY, METERED RESPIRATORY (INHALATION) at 17:02

## 2023-01-01 RX ADMIN — IPRATROPIUM BROMIDE AND ALBUTEROL 1 PUFF: 20; 100 SPRAY, METERED RESPIRATORY (INHALATION) at 08:56

## 2023-01-01 RX ADMIN — MAGNESIUM OXIDE TAB 400 MG (241.3 MG ELEMENTAL MG) 400 MG: 400 (241.3 MG) TAB at 20:42

## 2023-01-01 RX ADMIN — Medication 2 G: at 08:19

## 2023-01-01 RX ADMIN — EPHEDRINE SULFATE 5 MG: 5 INJECTION INTRAVENOUS at 12:30

## 2023-01-01 RX ADMIN — IPRATROPIUM BROMIDE AND ALBUTEROL 1 PUFF: 20; 100 SPRAY, METERED RESPIRATORY (INHALATION) at 11:22

## 2023-01-01 RX ADMIN — CLOPIDOGREL BISULFATE 75 MG: 75 TABLET ORAL at 08:51

## 2023-01-01 RX ADMIN — ATORVASTATIN CALCIUM 80 MG: 80 TABLET, FILM COATED ORAL at 09:11

## 2023-01-01 RX ADMIN — CALCIUM CARBONATE 600 MG (1,500 MG)-VITAMIN D3 400 UNIT TABLET 1 TABLET: at 20:01

## 2023-01-01 RX ADMIN — TACROLIMUS 2 MG: 1 CAPSULE ORAL at 09:07

## 2023-01-01 RX ADMIN — REMDESIVIR 100 MG: 100 INJECTION, POWDER, LYOPHILIZED, FOR SOLUTION INTRAVENOUS at 18:05

## 2023-01-01 RX ADMIN — PHENYLEPHRINE HYDROCHLORIDE 100 MCG: 10 INJECTION INTRAVENOUS at 11:31

## 2023-01-01 RX ADMIN — ACETAMINOPHEN 650 MG: 325 TABLET, FILM COATED ORAL at 03:31

## 2023-01-01 RX ADMIN — PREDNISONE 5 MG: 5 TABLET ORAL at 08:56

## 2023-01-01 RX ADMIN — IPRATROPIUM BROMIDE AND ALBUTEROL 1 PUFF: 20; 100 SPRAY, METERED RESPIRATORY (INHALATION) at 20:00

## 2023-01-01 RX ADMIN — IPRATROPIUM BROMIDE AND ALBUTEROL 1 PUFF: 20; 100 SPRAY, METERED RESPIRATORY (INHALATION) at 20:11

## 2023-01-01 RX ADMIN — PANTOPRAZOLE SODIUM 40 MG: 40 TABLET, DELAYED RELEASE ORAL at 08:07

## 2023-01-01 RX ADMIN — HYDRALAZINE HYDROCHLORIDE 25 MG: 25 TABLET ORAL at 17:05

## 2023-01-01 RX ADMIN — HEPARIN SODIUM 5000 UNITS: 5000 INJECTION, SOLUTION INTRAVENOUS; SUBCUTANEOUS at 18:39

## 2023-01-01 RX ADMIN — IPRATROPIUM BROMIDE AND ALBUTEROL SULFATE 3 ML: .5; 3 SOLUTION RESPIRATORY (INHALATION) at 17:28

## 2023-01-01 RX ADMIN — NIFEDIPINE 30 MG: 30 TABLET, FILM COATED, EXTENDED RELEASE ORAL at 02:27

## 2023-01-01 RX ADMIN — HYDRALAZINE HYDROCHLORIDE 50 MG: 25 TABLET ORAL at 08:30

## 2023-01-01 RX ADMIN — PREDNISONE 5 MG: 5 TABLET ORAL at 08:35

## 2023-01-01 RX ADMIN — HYDRALAZINE HYDROCHLORIDE 10 MG: 10 TABLET ORAL at 16:01

## 2023-01-01 RX ADMIN — MAGNESIUM OXIDE TAB 400 MG (241.3 MG ELEMENTAL MG) 400 MG: 400 (241.3 MG) TAB at 09:31

## 2023-01-01 RX ADMIN — CALCIUM CARBONATE 600 MG (1,500 MG)-VITAMIN D3 400 UNIT TABLET 1 TABLET: at 09:11

## 2023-01-01 RX ADMIN — PANTOPRAZOLE SODIUM 40 MG: 40 TABLET, DELAYED RELEASE ORAL at 09:31

## 2023-01-01 RX ADMIN — ACETAMINOPHEN 650 MG: 325 TABLET, FILM COATED ORAL at 12:11

## 2023-01-01 RX ADMIN — IPRATROPIUM BROMIDE AND ALBUTEROL 1 PUFF: 20; 100 SPRAY, METERED RESPIRATORY (INHALATION) at 20:35

## 2023-01-01 RX ADMIN — PHENYLEPHRINE HYDROCHLORIDE 100 MCG: 10 INJECTION INTRAVENOUS at 12:24

## 2023-01-01 RX ADMIN — SODIUM CHLORIDE 75 ML/HR: 9 INJECTION, SOLUTION INTRAVENOUS at 17:58

## 2023-01-01 RX ADMIN — MAGNESIUM OXIDE TAB 400 MG (241.3 MG ELEMENTAL MG) 400 MG: 400 (241.3 MG) TAB at 08:28

## 2023-01-01 RX ADMIN — TACROLIMUS 1.5 MG: 1 CAPSULE ORAL at 08:44

## 2023-01-01 RX ADMIN — SENNOSIDES AND DOCUSATE SODIUM 1 TABLET: 50; 8.6 TABLET ORAL at 10:10

## 2023-01-01 RX ADMIN — TACROLIMUS 1.5 MG: 1 CAPSULE ORAL at 19:44

## 2023-01-01 RX ADMIN — IPRATROPIUM BROMIDE AND ALBUTEROL SULFATE 3 ML: .5; 3 SOLUTION RESPIRATORY (INHALATION) at 20:07

## 2023-01-01 RX ADMIN — METOPROLOL TARTRATE 50 MG: 50 TABLET, FILM COATED ORAL at 21:29

## 2023-01-01 RX ADMIN — PANTOPRAZOLE SODIUM 40 MG: 40 TABLET, DELAYED RELEASE ORAL at 08:35

## 2023-01-01 RX ADMIN — ATORVASTATIN CALCIUM 80 MG: 40 TABLET, FILM COATED ORAL at 08:30

## 2023-01-01 RX ADMIN — Medication 10 MG: at 09:25

## 2023-01-01 RX ADMIN — ONDANSETRON 4 MG: 4 TABLET, ORALLY DISINTEGRATING ORAL at 09:36

## 2023-01-01 RX ADMIN — METOPROLOL TARTRATE 100 MG: 50 TABLET, FILM COATED ORAL at 08:53

## 2023-01-01 RX ADMIN — HYDRALAZINE HYDROCHLORIDE 50 MG: 50 TABLET, FILM COATED ORAL at 05:56

## 2023-01-01 RX ADMIN — HYDRALAZINE HYDROCHLORIDE 100 MG: 50 TABLET, FILM COATED ORAL at 22:41

## 2023-01-01 RX ADMIN — POLYETHYLENE GLYCOL 3350 17 G: 17 POWDER, FOR SOLUTION ORAL at 08:30

## 2023-01-01 RX ADMIN — CLOPIDOGREL BISULFATE 75 MG: 75 TABLET ORAL at 08:55

## 2023-01-01 RX ADMIN — IPRATROPIUM BROMIDE AND ALBUTEROL 1 PUFF: 20; 100 SPRAY, METERED RESPIRATORY (INHALATION) at 17:07

## 2023-01-01 RX ADMIN — IPRATROPIUM BROMIDE AND ALBUTEROL 1 PUFF: 20; 100 SPRAY, METERED RESPIRATORY (INHALATION) at 09:33

## 2023-01-01 RX ADMIN — ACETAMINOPHEN 650 MG: 325 TABLET, FILM COATED ORAL at 22:34

## 2023-01-01 RX ADMIN — DEXAMETHASONE 6 MG: 2 TABLET ORAL at 18:24

## 2023-01-01 RX ADMIN — TACROLIMUS 1.5 MG: 1 CAPSULE ORAL at 17:43

## 2023-01-01 RX ADMIN — CEPHALEXIN 250 MG: 250 CAPSULE ORAL at 15:34

## 2023-01-01 RX ADMIN — POTASSIUM & SODIUM PHOSPHATES POWDER PACK 280-160-250 MG 1 PACKET: 280-160-250 PACK at 20:23

## 2023-01-01 RX ADMIN — CLOPIDOGREL BISULFATE 75 MG: 75 TABLET ORAL at 09:11

## 2023-01-01 RX ADMIN — CLOPIDOGREL BISULFATE 75 MG: 75 TABLET ORAL at 08:45

## 2023-01-01 RX ADMIN — IPRATROPIUM BROMIDE AND ALBUTEROL 2 PUFF: 20; 100 SPRAY, METERED RESPIRATORY (INHALATION) at 22:21

## 2023-01-01 RX ADMIN — SENNOSIDES AND DOCUSATE SODIUM 1 TABLET: 50; 8.6 TABLET ORAL at 20:44

## 2023-01-01 RX ADMIN — THERA TABS 1 TABLET: TAB at 09:31

## 2023-01-01 RX ADMIN — MAGNESIUM OXIDE TAB 400 MG (241.3 MG ELEMENTAL MG) 400 MG: 400 (241.3 MG) TAB at 22:41

## 2023-01-01 RX ADMIN — IOPAMIDOL 90 ML: 755 INJECTION, SOLUTION INTRAVASCULAR at 15:56

## 2023-01-01 RX ADMIN — ASPIRIN 81 MG CHEWABLE TABLET 81 MG: 81 TABLET CHEWABLE at 08:03

## 2023-01-01 RX ADMIN — DIATRIZOATE MEGLUMINE AND DIATRIZOATE SODIUM 480 ML: 660; 100 SOLUTION ORAL; RECTAL at 15:01

## 2023-01-01 RX ADMIN — CLOPIDOGREL BISULFATE 75 MG: 75 TABLET ORAL at 08:27

## 2023-01-01 RX ADMIN — Medication 30 MG: at 08:14

## 2023-01-01 RX ADMIN — ATORVASTATIN CALCIUM 80 MG: 40 TABLET, FILM COATED ORAL at 08:19

## 2023-01-01 RX ADMIN — HYDRALAZINE HYDROCHLORIDE 100 MG: 50 TABLET, FILM COATED ORAL at 14:11

## 2023-01-01 RX ADMIN — HEPARIN SODIUM 5000 UNITS: 1000 INJECTION INTRAVENOUS; SUBCUTANEOUS at 09:58

## 2023-01-01 RX ADMIN — ACETAMINOPHEN 650 MG: 325 TABLET, FILM COATED ORAL at 03:08

## 2023-01-01 RX ADMIN — HYDRALAZINE HYDROCHLORIDE 25 MG: 25 TABLET ORAL at 15:18

## 2023-01-01 RX ADMIN — TACROLIMUS 1.5 MG: 1 CAPSULE ORAL at 18:31

## 2023-01-01 RX ADMIN — CLOPIDOGREL BISULFATE 75 MG: 75 TABLET ORAL at 08:25

## 2023-01-01 RX ADMIN — HYDRALAZINE HYDROCHLORIDE 25 MG: 25 TABLET, FILM COATED ORAL at 09:34

## 2023-01-01 RX ADMIN — ACETAMINOPHEN 650 MG: 325 TABLET, FILM COATED ORAL at 09:37

## 2023-01-01 RX ADMIN — EPHEDRINE SULFATE 2.5 MG: 5 INJECTION INTRAVENOUS at 11:43

## 2023-01-01 RX ADMIN — CALCIUM CARBONATE 600 MG (1,500 MG)-VITAMIN D3 400 UNIT TABLET 1 TABLET: at 08:07

## 2023-01-01 RX ADMIN — PHENYLEPHRINE HYDROCHLORIDE 100 MCG: 10 INJECTION INTRAVENOUS at 11:33

## 2023-01-01 RX ADMIN — ISOSORBIDE MONONITRATE 60 MG: 60 TABLET, EXTENDED RELEASE ORAL at 08:04

## 2023-01-01 RX ADMIN — ACETAMINOPHEN 650 MG: 325 TABLET, FILM COATED ORAL at 09:13

## 2023-01-01 RX ADMIN — ASPIRIN 81 MG CHEWABLE TABLET 81 MG: 81 TABLET CHEWABLE at 09:11

## 2023-01-01 RX ADMIN — ISOSORBIDE MONONITRATE 60 MG: 60 TABLET, EXTENDED RELEASE ORAL at 08:56

## 2023-01-01 RX ADMIN — SODIUM CHLORIDE 1000 ML: 9 INJECTION, SOLUTION INTRAVENOUS at 12:04

## 2023-01-01 RX ADMIN — THERA TABS 1 TABLET: TAB at 12:11

## 2023-01-01 RX ADMIN — IPRATROPIUM BROMIDE AND ALBUTEROL 1 PUFF: 20; 100 SPRAY, METERED RESPIRATORY (INHALATION) at 13:17

## 2023-01-01 RX ADMIN — HEPARIN SODIUM 5000 UNITS: 5000 INJECTION, SOLUTION INTRAVENOUS; SUBCUTANEOUS at 20:01

## 2023-01-01 RX ADMIN — POTASSIUM & SODIUM PHOSPHATES POWDER PACK 280-160-250 MG 1 PACKET: 280-160-250 PACK at 20:39

## 2023-01-01 RX ADMIN — TACROLIMUS 2 MG: 1 CAPSULE ORAL at 20:37

## 2023-01-01 RX ADMIN — CALCIUM CARBONATE 600 MG (1,500 MG)-VITAMIN D3 400 UNIT TABLET 1 TABLET: at 08:51

## 2023-01-01 RX ADMIN — TACROLIMUS 1.5 MG: 1 CAPSULE ORAL at 09:29

## 2023-01-01 RX ADMIN — CLOPIDOGREL BISULFATE 75 MG: 75 TABLET ORAL at 08:18

## 2023-01-01 RX ADMIN — IPRATROPIUM BROMIDE AND ALBUTEROL 2 PUFF: 20; 100 SPRAY, METERED RESPIRATORY (INHALATION) at 15:32

## 2023-01-01 RX ADMIN — ACETAMINOPHEN 650 MG: 325 TABLET, FILM COATED ORAL at 16:37

## 2023-01-01 RX ADMIN — ACETAMINOPHEN 975 MG: 325 TABLET, FILM COATED ORAL at 17:19

## 2023-01-01 RX ADMIN — IPRATROPIUM BROMIDE AND ALBUTEROL SULFATE 3 ML: .5; 3 SOLUTION RESPIRATORY (INHALATION) at 10:51

## 2023-01-01 RX ADMIN — ATORVASTATIN CALCIUM 80 MG: 80 TABLET, FILM COATED ORAL at 08:27

## 2023-01-01 RX ADMIN — IPRATROPIUM BROMIDE AND ALBUTEROL 1 PUFF: 20; 100 SPRAY, METERED RESPIRATORY (INHALATION) at 08:47

## 2023-01-01 RX ADMIN — ASPIRIN 81 MG 81 MG: 81 TABLET ORAL at 08:56

## 2023-01-01 RX ADMIN — HYDRALAZINE HYDROCHLORIDE 25 MG: 25 TABLET, FILM COATED ORAL at 22:48

## 2023-01-01 RX ADMIN — ATORVASTATIN CALCIUM 80 MG: 40 TABLET, FILM COATED ORAL at 08:27

## 2023-01-01 RX ADMIN — HEPARIN SODIUM 5000 UNITS: 5000 INJECTION, SOLUTION INTRAVENOUS; SUBCUTANEOUS at 17:55

## 2023-01-01 RX ADMIN — ONDANSETRON 4 MG: 4 TABLET, ORALLY DISINTEGRATING ORAL at 16:44

## 2023-01-01 RX ADMIN — HEPARIN SODIUM 5000 UNITS: 5000 INJECTION, SOLUTION INTRAVENOUS; SUBCUTANEOUS at 04:27

## 2023-01-01 RX ADMIN — OXYCODONE HYDROCHLORIDE 5 MG: 5 TABLET ORAL at 20:05

## 2023-01-01 RX ADMIN — HYDROMORPHONE HYDROCHLORIDE 0.5 MG: 1 INJECTION, SOLUTION INTRAMUSCULAR; INTRAVENOUS; SUBCUTANEOUS at 11:26

## 2023-01-01 RX ADMIN — CLOPIDOGREL BISULFATE 75 MG: 75 TABLET ORAL at 08:04

## 2023-01-01 RX ADMIN — ONDANSETRON 4 MG: 4 TABLET, ORALLY DISINTEGRATING ORAL at 21:41

## 2023-01-01 RX ADMIN — LISINOPRIL 2.5 MG: 2.5 TABLET ORAL at 08:04

## 2023-01-01 RX ADMIN — ISOSORBIDE MONONITRATE 60 MG: 60 TABLET, EXTENDED RELEASE ORAL at 09:07

## 2023-01-01 RX ADMIN — IPRATROPIUM BROMIDE AND ALBUTEROL 1 PUFF: 20; 100 SPRAY, METERED RESPIRATORY (INHALATION) at 21:41

## 2023-01-01 RX ADMIN — IPRATROPIUM BROMIDE AND ALBUTEROL 1 PUFF: 20; 100 SPRAY, METERED RESPIRATORY (INHALATION) at 15:59

## 2023-01-01 RX ADMIN — HYDRALAZINE HYDROCHLORIDE 25 MG: 25 TABLET ORAL at 21:29

## 2023-01-01 RX ADMIN — HYDROMORPHONE HYDROCHLORIDE 0.5 MG: 1 INJECTION, SOLUTION INTRAMUSCULAR; INTRAVENOUS; SUBCUTANEOUS at 17:50

## 2023-01-01 RX ADMIN — HYDRALAZINE HYDROCHLORIDE 10 MG: 10 TABLET ORAL at 16:03

## 2023-01-01 RX ADMIN — METOPROLOL TARTRATE 100 MG: 50 TABLET, FILM COATED ORAL at 09:07

## 2023-01-01 RX ADMIN — HYDRALAZINE HYDROCHLORIDE 50 MG: 25 TABLET ORAL at 15:45

## 2023-01-01 RX ADMIN — ASPIRIN 81 MG CHEWABLE TABLET 81 MG: 81 TABLET CHEWABLE at 08:25

## 2023-01-01 RX ADMIN — ACETAMINOPHEN 1000 MG: 500 TABLET, FILM COATED ORAL at 21:59

## 2023-01-01 RX ADMIN — SODIUM CHLORIDE, PRESERVATIVE FREE 50 ML: 5 INJECTION INTRAVENOUS at 18:05

## 2023-01-01 RX ADMIN — IPRATROPIUM BROMIDE AND ALBUTEROL 1 PUFF: 20; 100 SPRAY, METERED RESPIRATORY (INHALATION) at 08:35

## 2023-01-01 RX ADMIN — ACETAMINOPHEN 1000 MG: 500 TABLET, FILM COATED ORAL at 22:00

## 2023-01-01 RX ADMIN — HYDRALAZINE HYDROCHLORIDE 100 MG: 50 TABLET, FILM COATED ORAL at 13:12

## 2023-01-01 RX ADMIN — ISOSORBIDE MONONITRATE 60 MG: 60 TABLET, EXTENDED RELEASE ORAL at 08:50

## 2023-01-01 RX ADMIN — MAGNESIUM OXIDE TAB 400 MG (241.3 MG ELEMENTAL MG) 400 MG: 400 (241.3 MG) TAB at 08:07

## 2023-01-01 RX ADMIN — ACETAMINOPHEN 650 MG: 325 TABLET, FILM COATED ORAL at 06:50

## 2023-01-01 RX ADMIN — ACETAMINOPHEN 650 MG: 325 TABLET, FILM COATED ORAL at 13:12

## 2023-01-01 RX ADMIN — TACROLIMUS 2 MG: 1 CAPSULE ORAL at 18:05

## 2023-01-01 RX ADMIN — HYDRALAZINE HYDROCHLORIDE 10 MG: 20 INJECTION INTRAMUSCULAR; INTRAVENOUS at 13:26

## 2023-01-01 RX ADMIN — PREDNISONE 5 MG: 5 TABLET ORAL at 08:27

## 2023-01-01 RX ADMIN — EPHEDRINE SULFATE 2.5 MG: 5 INJECTION INTRAVENOUS at 08:54

## 2023-01-01 RX ADMIN — CALCIUM CARBONATE 600 MG (1,500 MG)-VITAMIN D3 400 UNIT TABLET 1 TABLET: at 20:42

## 2023-01-01 RX ADMIN — HEPARIN SODIUM 5000 UNITS: 5000 INJECTION, SOLUTION INTRAVENOUS; SUBCUTANEOUS at 08:07

## 2023-01-01 RX ADMIN — HEPARIN SODIUM 5000 UNITS: 5000 INJECTION, SOLUTION INTRAVENOUS; SUBCUTANEOUS at 09:31

## 2023-01-01 RX ADMIN — OXYCODONE HYDROCHLORIDE 5 MG: 5 TABLET ORAL at 03:41

## 2023-01-01 RX ADMIN — ATORVASTATIN CALCIUM 80 MG: 40 TABLET, FILM COATED ORAL at 09:06

## 2023-01-01 RX ADMIN — HYDRALAZINE HYDROCHLORIDE 10 MG: 10 TABLET ORAL at 20:44

## 2023-01-01 RX ADMIN — CLOPIDOGREL BISULFATE 75 MG: 75 TABLET ORAL at 08:31

## 2023-01-01 RX ADMIN — ASPIRIN 81 MG CHEWABLE TABLET 81 MG: 81 TABLET CHEWABLE at 08:42

## 2023-01-01 RX ADMIN — ASPIRIN 81 MG CHEWABLE TABLET 81 MG: 81 TABLET CHEWABLE at 08:07

## 2023-01-01 RX ADMIN — TACROLIMUS 1.5 MG: 1 CAPSULE ORAL at 08:55

## 2023-01-01 RX ADMIN — POLYETHYLENE GLYCOL 3350 17 G: 17 POWDER, FOR SOLUTION ORAL at 08:52

## 2023-01-01 RX ADMIN — METOPROLOL TARTRATE 100 MG: 50 TABLET, FILM COATED ORAL at 08:27

## 2023-01-01 RX ADMIN — PANTOPRAZOLE SODIUM 40 MG: 40 TABLET, DELAYED RELEASE ORAL at 08:19

## 2023-01-01 RX ADMIN — METOPROLOL TARTRATE 100 MG: 50 TABLET, FILM COATED ORAL at 08:04

## 2023-01-01 RX ADMIN — CEFAZOLIN 1 G: 1 INJECTION, POWDER, FOR SOLUTION INTRAMUSCULAR; INTRAVENOUS at 08:09

## 2023-01-01 RX ADMIN — ATORVASTATIN CALCIUM 80 MG: 40 TABLET, FILM COATED ORAL at 08:45

## 2023-01-01 RX ADMIN — SENNOSIDES AND DOCUSATE SODIUM 2 TABLET: 50; 8.6 TABLET ORAL at 08:30

## 2023-01-01 RX ADMIN — METOPROLOL TARTRATE 100 MG: 25 TABLET, FILM COATED ORAL at 09:11

## 2023-01-01 RX ADMIN — POLYETHYLENE GLYCOL 3350 17 G: 17 POWDER, FOR SOLUTION ORAL at 08:35

## 2023-01-01 RX ADMIN — METOPROLOL TARTRATE 100 MG: 50 TABLET, FILM COATED ORAL at 08:45

## 2023-01-01 RX ADMIN — CALCIUM CARBONATE 600 MG (1,500 MG)-VITAMIN D3 400 UNIT TABLET 1 TABLET: at 08:25

## 2023-01-01 RX ADMIN — POTASSIUM & SODIUM PHOSPHATES POWDER PACK 280-160-250 MG 1 PACKET: 280-160-250 PACK at 08:07

## 2023-01-01 RX ADMIN — ASPIRIN 81 MG CHEWABLE TABLET 81 MG: 81 TABLET CHEWABLE at 08:50

## 2023-01-01 RX ADMIN — FENTANYL CITRATE 50 MCG: 50 INJECTION, SOLUTION INTRAMUSCULAR; INTRAVENOUS at 08:14

## 2023-01-01 RX ADMIN — PROCHLORPERAZINE EDISYLATE 5 MG: 5 INJECTION INTRAMUSCULAR; INTRAVENOUS at 19:48

## 2023-01-01 RX ADMIN — METOPROLOL TARTRATE 100 MG: 50 TABLET, FILM COATED ORAL at 09:30

## 2023-01-01 RX ADMIN — ISOSORBIDE MONONITRATE 60 MG: 60 TABLET, EXTENDED RELEASE ORAL at 08:35

## 2023-01-01 RX ADMIN — SODIUM CHLORIDE, POTASSIUM CHLORIDE, SODIUM LACTATE AND CALCIUM CHLORIDE: 600; 310; 30; 20 INJECTION, SOLUTION INTRAVENOUS at 06:14

## 2023-01-01 RX ADMIN — Medication 10 MG: at 08:45

## 2023-01-01 RX ADMIN — TACROLIMUS 2 MG: 1 CAPSULE ORAL at 08:25

## 2023-01-01 RX ADMIN — CALCIUM CARBONATE 600 MG (1,500 MG)-VITAMIN D3 400 UNIT TABLET 1 TABLET: at 08:42

## 2023-01-01 RX ADMIN — IPRATROPIUM BROMIDE AND ALBUTEROL 1 PUFF: 20; 100 SPRAY, METERED RESPIRATORY (INHALATION) at 20:52

## 2023-01-01 RX ADMIN — ACETAMINOPHEN 650 MG: 325 TABLET, FILM COATED ORAL at 08:27

## 2023-01-01 RX ADMIN — ACETAMINOPHEN 650 MG: 325 TABLET, FILM COATED ORAL at 08:47

## 2023-01-01 RX ADMIN — PANTOPRAZOLE SODIUM 40 MG: 40 TABLET, DELAYED RELEASE ORAL at 08:28

## 2023-01-01 RX ADMIN — ASPIRIN 81 MG CHEWABLE TABLET 81 MG: 81 TABLET CHEWABLE at 09:32

## 2023-01-01 RX ADMIN — ATORVASTATIN CALCIUM 80 MG: 80 TABLET, FILM COATED ORAL at 21:20

## 2023-01-01 RX ADMIN — HEPARIN SODIUM 5000 UNITS: 5000 INJECTION, SOLUTION INTRAVENOUS; SUBCUTANEOUS at 08:52

## 2023-01-01 RX ADMIN — ISOSORBIDE MONONITRATE 60 MG: 60 TABLET, EXTENDED RELEASE ORAL at 08:43

## 2023-01-01 RX ADMIN — MAGNESIUM SULFATE IN WATER 2 G: 40 INJECTION, SOLUTION INTRAVENOUS at 11:48

## 2023-01-01 RX ADMIN — MAGNESIUM SULFATE IN WATER 2 G: 40 INJECTION, SOLUTION INTRAVENOUS at 20:45

## 2023-01-01 RX ADMIN — ACETAMINOPHEN 650 MG: 325 TABLET, FILM COATED ORAL at 00:01

## 2023-01-01 RX ADMIN — PANTOPRAZOLE SODIUM 40 MG: 40 TABLET, DELAYED RELEASE ORAL at 06:50

## 2023-01-01 RX ADMIN — IPRATROPIUM BROMIDE AND ALBUTEROL 1 PUFF: 20; 100 SPRAY, METERED RESPIRATORY (INHALATION) at 08:19

## 2023-01-01 RX ADMIN — ATORVASTATIN CALCIUM 80 MG: 40 TABLET, FILM COATED ORAL at 08:55

## 2023-01-01 RX ADMIN — PREDNISONE 5 MG: 5 TABLET ORAL at 08:18

## 2023-01-01 RX ADMIN — HYDRALAZINE HYDROCHLORIDE 25 MG: 25 TABLET ORAL at 08:45

## 2023-01-01 RX ADMIN — IPRATROPIUM BROMIDE AND ALBUTEROL 1 PUFF: 20; 100 SPRAY, METERED RESPIRATORY (INHALATION) at 08:06

## 2023-01-01 RX ADMIN — SODIUM CHLORIDE: 9 INJECTION, SOLUTION INTRAVENOUS at 21:06

## 2023-01-01 RX ADMIN — PANTOPRAZOLE SODIUM 40 MG: 40 TABLET, DELAYED RELEASE ORAL at 06:41

## 2023-01-01 RX ADMIN — IPRATROPIUM BROMIDE AND ALBUTEROL 2 PUFF: 20; 100 SPRAY, METERED RESPIRATORY (INHALATION) at 11:19

## 2023-01-01 RX ADMIN — ATORVASTATIN CALCIUM 80 MG: 80 TABLET, FILM COATED ORAL at 15:31

## 2023-01-01 RX ADMIN — ACETAMINOPHEN 975 MG: 325 TABLET, FILM COATED ORAL at 09:52

## 2023-01-01 RX ADMIN — TACROLIMUS 2 MG: 1 CAPSULE ORAL at 08:07

## 2023-01-01 RX ADMIN — TACROLIMUS 1.5 MG: 1 CAPSULE ORAL at 21:40

## 2023-01-01 RX ADMIN — SENNOSIDES AND DOCUSATE SODIUM 1 TABLET: 50; 8.6 TABLET ORAL at 19:40

## 2023-01-01 RX ADMIN — ASPIRIN 81 MG 81 MG: 81 TABLET ORAL at 08:27

## 2023-01-01 RX ADMIN — HYDRALAZINE HYDROCHLORIDE 10 MG: 10 TABLET, FILM COATED ORAL at 13:54

## 2023-01-01 RX ADMIN — POTASSIUM & SODIUM PHOSPHATES POWDER PACK 280-160-250 MG 1 PACKET: 280-160-250 PACK at 01:27

## 2023-01-01 RX ADMIN — DEXAMETHASONE 6 MG: 2 TABLET ORAL at 08:42

## 2023-01-01 RX ADMIN — CLOPIDOGREL BISULFATE 75 MG: 75 TABLET ORAL at 09:07

## 2023-01-01 RX ADMIN — HEPARIN SODIUM 5000 UNITS: 5000 INJECTION, SOLUTION INTRAVENOUS; SUBCUTANEOUS at 08:42

## 2023-01-01 RX ADMIN — SODIUM CHLORIDE: 9 INJECTION, SOLUTION INTRAVENOUS at 03:06

## 2023-01-01 RX ADMIN — SENNOSIDES AND DOCUSATE SODIUM 1 TABLET: 50; 8.6 TABLET ORAL at 20:19

## 2023-01-01 RX ADMIN — CALCIUM CARBONATE 600 MG (1,500 MG)-VITAMIN D3 400 UNIT TABLET 1 TABLET: at 20:36

## 2023-01-01 RX ADMIN — PREDNISONE 5 MG: 5 TABLET ORAL at 08:31

## 2023-01-01 RX ADMIN — SENNOSIDES AND DOCUSATE SODIUM 2 TABLET: 50; 8.6 TABLET ORAL at 08:52

## 2023-01-01 RX ADMIN — SODIUM CHLORIDE 50 ML: 9 INJECTION, SOLUTION INTRAVENOUS at 19:29

## 2023-01-01 RX ADMIN — LISINOPRIL 2.5 MG: 2.5 TABLET ORAL at 08:25

## 2023-01-01 RX ADMIN — IPRATROPIUM BROMIDE AND ALBUTEROL 2 PUFF: 20; 100 SPRAY, METERED RESPIRATORY (INHALATION) at 19:56

## 2023-01-01 RX ADMIN — CALCIUM CARBONATE 600 MG (1,500 MG)-VITAMIN D3 400 UNIT TABLET 1 TABLET: at 09:31

## 2023-01-01 RX ADMIN — POLYETHYLENE GLYCOL 3350 17 G: 17 POWDER, FOR SOLUTION ORAL at 16:03

## 2023-01-01 RX ADMIN — METOPROLOL TARTRATE 100 MG: 50 TABLET, FILM COATED ORAL at 08:35

## 2023-01-01 RX ADMIN — HYDRALAZINE HYDROCHLORIDE 10 MG: 10 TABLET ORAL at 09:06

## 2023-01-01 RX ADMIN — ONDANSETRON 4 MG: 2 INJECTION INTRAMUSCULAR; INTRAVENOUS at 12:05

## 2023-01-01 RX ADMIN — PROCHLORPERAZINE EDISYLATE 5 MG: 5 INJECTION INTRAMUSCULAR; INTRAVENOUS at 13:45

## 2023-01-01 RX ADMIN — SODIUM CHLORIDE 1000 ML: 9 INJECTION, SOLUTION INTRAVENOUS at 11:50

## 2023-01-01 RX ADMIN — PANTOPRAZOLE SODIUM 40 MG: 40 TABLET, DELAYED RELEASE ORAL at 06:52

## 2023-01-01 RX ADMIN — NOREPINEPHRINE BITARTRATE 12.8 MCG: 1 INJECTION, SOLUTION, CONCENTRATE INTRAVENOUS at 11:50

## 2023-01-01 RX ADMIN — SODIUM CHLORIDE, POTASSIUM CHLORIDE, SODIUM LACTATE AND CALCIUM CHLORIDE: 600; 310; 30; 20 INJECTION, SOLUTION INTRAVENOUS at 10:31

## 2023-01-01 RX ADMIN — DEXAMETHASONE 6 MG: 2 TABLET ORAL at 08:07

## 2023-01-01 RX ADMIN — CLOPIDOGREL BISULFATE 75 MG: 75 TABLET ORAL at 08:42

## 2023-01-01 RX ADMIN — IPRATROPIUM BROMIDE AND ALBUTEROL 1 PUFF: 20; 100 SPRAY, METERED RESPIRATORY (INHALATION) at 08:28

## 2023-01-01 RX ADMIN — MAGNESIUM OXIDE TAB 400 MG (241.3 MG ELEMENTAL MG) 400 MG: 400 (241.3 MG) TAB at 09:11

## 2023-01-01 RX ADMIN — CLOPIDOGREL BISULFATE 75 MG: 75 TABLET ORAL at 08:07

## 2023-01-01 RX ADMIN — SENNOSIDES AND DOCUSATE SODIUM 2 TABLET: 50; 8.6 TABLET ORAL at 20:25

## 2023-01-01 RX ADMIN — SIMETHICONE 80 MG: 80 TABLET, CHEWABLE ORAL at 17:02

## 2023-01-01 RX ADMIN — EPHEDRINE SULFATE 5 MG: 5 INJECTION INTRAVENOUS at 08:20

## 2023-01-01 RX ADMIN — IPRATROPIUM BROMIDE AND ALBUTEROL 1 PUFF: 20; 100 SPRAY, METERED RESPIRATORY (INHALATION) at 20:19

## 2023-01-01 RX ADMIN — METOPROLOL TARTRATE 100 MG: 25 TABLET, FILM COATED ORAL at 08:51

## 2023-01-01 RX ADMIN — PANTOPRAZOLE SODIUM 40 MG: 40 TABLET, DELAYED RELEASE ORAL at 08:03

## 2023-01-01 RX ADMIN — PREDNISONE 5 MG: 5 TABLET ORAL at 08:53

## 2023-01-01 RX ADMIN — ASPIRIN 81 MG CHEWABLE TABLET 81 MG: 81 TABLET CHEWABLE at 08:27

## 2023-01-01 RX ADMIN — DEXAMETHASONE SODIUM PHOSPHATE 10 MG: 10 INJECTION, SOLUTION INTRAMUSCULAR; INTRAVENOUS at 10:51

## 2023-01-01 RX ADMIN — OXYCODONE HYDROCHLORIDE 5 MG: 5 TABLET ORAL at 23:24

## 2023-01-01 RX ADMIN — HEPARIN SODIUM 5000 UNITS: 5000 INJECTION, SOLUTION INTRAVENOUS; SUBCUTANEOUS at 20:00

## 2023-01-01 RX ADMIN — HYDRALAZINE HYDROCHLORIDE 50 MG: 50 TABLET, FILM COATED ORAL at 15:26

## 2023-01-01 RX ADMIN — IPRATROPIUM BROMIDE AND ALBUTEROL 1 PUFF: 20; 100 SPRAY, METERED RESPIRATORY (INHALATION) at 19:42

## 2023-01-01 RX ADMIN — METOPROLOL TARTRATE 50 MG: 50 TABLET, FILM COATED ORAL at 19:44

## 2023-01-01 RX ADMIN — HYDRALAZINE HYDROCHLORIDE 25 MG: 25 TABLET ORAL at 08:19

## 2023-01-01 RX ADMIN — IPRATROPIUM BROMIDE AND ALBUTEROL 1 PUFF: 20; 100 SPRAY, METERED RESPIRATORY (INHALATION) at 11:24

## 2023-01-01 RX ADMIN — ACETAMINOPHEN 650 MG: 325 TABLET, FILM COATED ORAL at 06:01

## 2023-01-01 RX ADMIN — SODIUM CHLORIDE, POTASSIUM CHLORIDE, SODIUM LACTATE AND CALCIUM CHLORIDE: 600; 310; 30; 20 INJECTION, SOLUTION INTRAVENOUS at 16:44

## 2023-01-01 RX ADMIN — IPRATROPIUM BROMIDE AND ALBUTEROL 1 PUFF: 20; 100 SPRAY, METERED RESPIRATORY (INHALATION) at 17:05

## 2023-01-01 RX ADMIN — HYDRALAZINE HYDROCHLORIDE 25 MG: 25 TABLET ORAL at 15:19

## 2023-01-01 RX ADMIN — IPRATROPIUM BROMIDE AND ALBUTEROL 1 PUFF: 20; 100 SPRAY, METERED RESPIRATORY (INHALATION) at 14:03

## 2023-01-01 RX ADMIN — HYDRALAZINE HYDROCHLORIDE 10 MG: 10 TABLET, FILM COATED ORAL at 13:15

## 2023-01-01 RX ADMIN — IPRATROPIUM BROMIDE AND ALBUTEROL 1 PUFF: 20; 100 SPRAY, METERED RESPIRATORY (INHALATION) at 12:17

## 2023-01-01 RX ADMIN — METOPROLOL TARTRATE 100 MG: 50 TABLET, FILM COATED ORAL at 08:18

## 2023-01-01 RX ADMIN — TACROLIMUS 1.5 MG: 1 CAPSULE ORAL at 08:27

## 2023-01-01 RX ADMIN — IPRATROPIUM BROMIDE AND ALBUTEROL 1 PUFF: 20; 100 SPRAY, METERED RESPIRATORY (INHALATION) at 13:35

## 2023-01-01 RX ADMIN — OXYCODONE HYDROCHLORIDE 5 MG: 5 TABLET ORAL at 00:09

## 2023-01-01 RX ADMIN — LISINOPRIL 2.5 MG: 2.5 TABLET ORAL at 08:42

## 2023-01-01 RX ADMIN — ACETAMINOPHEN 650 MG: 325 TABLET, FILM COATED ORAL at 02:41

## 2023-01-01 RX ADMIN — REMDESIVIR 200 MG: 100 INJECTION, POWDER, LYOPHILIZED, FOR SOLUTION INTRAVENOUS at 18:23

## 2023-01-01 RX ADMIN — IPRATROPIUM BROMIDE AND ALBUTEROL 1 PUFF: 20; 100 SPRAY, METERED RESPIRATORY (INHALATION) at 11:20

## 2023-01-01 RX ADMIN — TACROLIMUS 2 MG: 1 CAPSULE ORAL at 18:38

## 2023-01-01 RX ADMIN — MAGNESIUM OXIDE TAB 400 MG (241.3 MG ELEMENTAL MG) 400 MG: 400 (241.3 MG) TAB at 17:56

## 2023-01-01 RX ADMIN — ACETAMINOPHEN 650 MG: 325 TABLET, FILM COATED ORAL at 20:16

## 2023-01-01 RX ADMIN — HYDRALAZINE HYDROCHLORIDE 10 MG: 10 TABLET ORAL at 08:27

## 2023-01-01 RX ADMIN — CALCIUM CARBONATE 600 MG (1,500 MG)-VITAMIN D3 400 UNIT TABLET 1 TABLET: at 19:28

## 2023-01-01 RX ADMIN — HYDRALAZINE HYDROCHLORIDE 10 MG: 10 TABLET, FILM COATED ORAL at 08:51

## 2023-01-01 RX ADMIN — ISOSORBIDE MONONITRATE 60 MG: 60 TABLET, EXTENDED RELEASE ORAL at 08:31

## 2023-01-01 RX ADMIN — ACETAMINOPHEN 650 MG: 325 TABLET, FILM COATED ORAL at 03:00

## 2023-01-01 RX ADMIN — ONDANSETRON 4 MG: 2 INJECTION INTRAMUSCULAR; INTRAVENOUS at 17:51

## 2023-01-01 RX ADMIN — ACETAMINOPHEN 650 MG: 325 TABLET, FILM COATED ORAL at 20:01

## 2023-01-01 RX ADMIN — SODIUM CHLORIDE, POTASSIUM CHLORIDE, SODIUM LACTATE AND CALCIUM CHLORIDE 500 ML: 600; 310; 30; 20 INJECTION, SOLUTION INTRAVENOUS at 14:57

## 2023-01-01 RX ADMIN — ISOSORBIDE MONONITRATE 60 MG: 60 TABLET, EXTENDED RELEASE ORAL at 08:25

## 2023-01-01 RX ADMIN — OXYCODONE HYDROCHLORIDE 5 MG: 5 TABLET ORAL at 15:31

## 2023-01-01 RX ADMIN — MAGNESIUM SULFATE IN WATER 2 G: 40 INJECTION, SOLUTION INTRAVENOUS at 08:06

## 2023-01-01 RX ADMIN — SENNOSIDES AND DOCUSATE SODIUM 2 TABLET: 50; 8.6 TABLET ORAL at 08:41

## 2023-01-01 RX ADMIN — HEPARIN SODIUM 5000 UNITS: 5000 INJECTION, SOLUTION INTRAVENOUS; SUBCUTANEOUS at 20:42

## 2023-01-01 RX ADMIN — IPRATROPIUM BROMIDE AND ALBUTEROL 1 PUFF: 20; 100 SPRAY, METERED RESPIRATORY (INHALATION) at 22:00

## 2023-01-01 RX ADMIN — ATORVASTATIN CALCIUM 80 MG: 40 TABLET, FILM COATED ORAL at 08:53

## 2023-01-01 RX ADMIN — PANTOPRAZOLE SODIUM 40 MG: 40 TABLET, DELAYED RELEASE ORAL at 06:36

## 2023-01-01 RX ADMIN — IPRATROPIUM BROMIDE AND ALBUTEROL 1 PUFF: 20; 100 SPRAY, METERED RESPIRATORY (INHALATION) at 22:42

## 2023-01-01 RX ADMIN — SENNOSIDES AND DOCUSATE SODIUM 1 TABLET: 50; 8.6 TABLET ORAL at 08:27

## 2023-01-01 RX ADMIN — DEXAMETHASONE 6 MG: 2 TABLET ORAL at 13:54

## 2023-01-01 RX ADMIN — METOPROLOL TARTRATE 50 MG: 50 TABLET, FILM COATED ORAL at 20:05

## 2023-01-01 RX ADMIN — Medication 50 MCG: at 08:28

## 2023-01-01 RX ADMIN — IPRATROPIUM BROMIDE AND ALBUTEROL 1 PUFF: 20; 100 SPRAY, METERED RESPIRATORY (INHALATION) at 16:01

## 2023-01-01 RX ADMIN — THERA TABS 1 TABLET: TAB at 08:28

## 2023-01-01 RX ADMIN — HYDRALAZINE HYDROCHLORIDE 10 MG: 10 TABLET, FILM COATED ORAL at 08:03

## 2023-01-01 RX ADMIN — DEXAMETHASONE 6 MG: 2 TABLET ORAL at 11:05

## 2023-01-01 RX ADMIN — Medication 10 MG: at 11:05

## 2023-01-01 RX ADMIN — ISOSORBIDE MONONITRATE 60 MG: 60 TABLET, EXTENDED RELEASE ORAL at 08:27

## 2023-01-01 RX ADMIN — PREDNISONE 5 MG: 5 TABLET ORAL at 09:06

## 2023-01-01 RX ADMIN — METOPROLOL TARTRATE 100 MG: 50 TABLET, FILM COATED ORAL at 08:55

## 2023-01-01 RX ADMIN — HEPARIN SODIUM 5000 UNITS: 5000 INJECTION, SOLUTION INTRAVENOUS; SUBCUTANEOUS at 22:41

## 2023-01-01 RX ADMIN — TORSEMIDE 10 MG: 10 TABLET ORAL at 17:02

## 2023-01-01 RX ADMIN — CLOPIDOGREL BISULFATE 75 MG: 75 TABLET ORAL at 08:35

## 2023-01-01 RX ADMIN — HYDRALAZINE HYDROCHLORIDE 100 MG: 25 TABLET ORAL at 20:08

## 2023-01-01 RX ADMIN — HYDRALAZINE HYDROCHLORIDE 100 MG: 50 TABLET, FILM COATED ORAL at 05:58

## 2023-01-01 RX ADMIN — IPRATROPIUM BROMIDE AND ALBUTEROL 1 PUFF: 20; 100 SPRAY, METERED RESPIRATORY (INHALATION) at 17:26

## 2023-01-01 RX ADMIN — POTASSIUM & SODIUM PHOSPHATES POWDER PACK 280-160-250 MG 1 PACKET: 280-160-250 PACK at 22:41

## 2023-01-01 RX ADMIN — CEFAZOLIN 1 G: 1 INJECTION, POWDER, FOR SOLUTION INTRAMUSCULAR; INTRAVENOUS at 21:20

## 2023-01-01 RX ADMIN — HYDRALAZINE HYDROCHLORIDE 10 MG: 10 TABLET, FILM COATED ORAL at 19:57

## 2023-01-01 RX ADMIN — IPRATROPIUM BROMIDE AND ALBUTEROL 2 PUFF: 20; 100 SPRAY, METERED RESPIRATORY (INHALATION) at 09:10

## 2023-01-01 RX ADMIN — ACETAMINOPHEN 650 MG: 325 TABLET, FILM COATED ORAL at 02:00

## 2023-01-01 RX ADMIN — ACETAMINOPHEN 650 MG: 325 TABLET, FILM COATED ORAL at 00:17

## 2023-01-01 RX ADMIN — ATORVASTATIN CALCIUM 80 MG: 80 TABLET, FILM COATED ORAL at 09:29

## 2023-01-01 RX ADMIN — PREDNISONE 5 MG: 5 TABLET ORAL at 08:45

## 2023-01-01 RX ADMIN — ACETAMINOPHEN 650 MG: 325 TABLET, FILM COATED ORAL at 12:55

## 2023-01-01 RX ADMIN — ISOSORBIDE MONONITRATE 60 MG: 60 TABLET, EXTENDED RELEASE ORAL at 09:11

## 2023-01-01 RX ADMIN — IPRATROPIUM BROMIDE AND ALBUTEROL 1 PUFF: 20; 100 SPRAY, METERED RESPIRATORY (INHALATION) at 08:45

## 2023-01-01 RX ADMIN — IPRATROPIUM BROMIDE AND ALBUTEROL 1 PUFF: 20; 100 SPRAY, METERED RESPIRATORY (INHALATION) at 13:27

## 2023-01-01 RX ADMIN — PANTOPRAZOLE SODIUM 40 MG: 40 TABLET, DELAYED RELEASE ORAL at 06:48

## 2023-01-01 RX ADMIN — ASPIRIN 81 MG 81 MG: 81 TABLET ORAL at 08:19

## 2023-01-01 RX ADMIN — ISOSORBIDE MONONITRATE 60 MG: 60 TABLET, EXTENDED RELEASE ORAL at 08:53

## 2023-01-01 RX ADMIN — HYDRALAZINE HYDROCHLORIDE 100 MG: 50 TABLET, FILM COATED ORAL at 09:30

## 2023-01-01 RX ADMIN — HYDRALAZINE HYDROCHLORIDE 10 MG: 10 TABLET ORAL at 08:53

## 2023-01-01 RX ADMIN — IPRATROPIUM BROMIDE AND ALBUTEROL 1 PUFF: 20; 100 SPRAY, METERED RESPIRATORY (INHALATION) at 15:50

## 2023-01-01 RX ADMIN — METOPROLOL TARTRATE 100 MG: 50 TABLET, FILM COATED ORAL at 08:25

## 2023-01-01 RX ADMIN — HYDRALAZINE HYDROCHLORIDE 10 MG: 10 TABLET, FILM COATED ORAL at 14:56

## 2023-01-01 RX ADMIN — ATORVASTATIN CALCIUM 80 MG: 80 TABLET, FILM COATED ORAL at 08:25

## 2023-01-01 RX ADMIN — OXYCODONE HYDROCHLORIDE 5 MG: 5 TABLET ORAL at 03:31

## 2023-01-01 RX ADMIN — PHENYLEPHRINE HYDROCHLORIDE 0.5 MCG/KG/MIN: 10 INJECTION INTRAVENOUS at 08:52

## 2023-01-01 RX ADMIN — HEPARIN SODIUM 5000 UNITS: 5000 INJECTION, SOLUTION INTRAVENOUS; SUBCUTANEOUS at 08:27

## 2023-01-01 RX ADMIN — TACROLIMUS 1.5 MG: 1 CAPSULE ORAL at 20:05

## 2023-01-01 RX ADMIN — OXYCODONE HYDROCHLORIDE 5 MG: 5 TABLET ORAL at 19:40

## 2023-01-01 RX ADMIN — SIMETHICONE 80 MG: 80 TABLET, CHEWABLE ORAL at 03:39

## 2023-01-01 RX ADMIN — HYDRALAZINE HYDROCHLORIDE 100 MG: 25 TABLET ORAL at 16:32

## 2023-01-01 RX ADMIN — HEPARIN SODIUM 5000 UNITS: 5000 INJECTION, SOLUTION INTRAVENOUS; SUBCUTANEOUS at 08:25

## 2023-01-01 RX ADMIN — ASPIRIN 81 MG 81 MG: 81 TABLET ORAL at 08:34

## 2023-01-01 RX ADMIN — DEXAMETHASONE 6 MG: 2 TABLET ORAL at 08:27

## 2023-01-01 RX ADMIN — IPRATROPIUM BROMIDE AND ALBUTEROL 1 PUFF: 20; 100 SPRAY, METERED RESPIRATORY (INHALATION) at 20:42

## 2023-01-01 RX ADMIN — IPRATROPIUM BROMIDE AND ALBUTEROL 2 PUFF: 20; 100 SPRAY, METERED RESPIRATORY (INHALATION) at 13:54

## 2023-01-01 RX ADMIN — CALCIUM CARBONATE 600 MG (1,500 MG)-VITAMIN D3 400 UNIT TABLET 1 TABLET: at 08:27

## 2023-01-01 RX ADMIN — TACROLIMUS 1.5 MG: 1 CAPSULE ORAL at 18:19

## 2023-01-01 RX ADMIN — POTASSIUM & SODIUM PHOSPHATES POWDER PACK 280-160-250 MG 1 PACKET: 280-160-250 PACK at 08:25

## 2023-01-01 RX ADMIN — EPHEDRINE SULFATE 2.5 MG: 5 INJECTION INTRAVENOUS at 11:30

## 2023-01-01 RX ADMIN — PANTOPRAZOLE SODIUM 40 MG: 40 TABLET, DELAYED RELEASE ORAL at 08:25

## 2023-01-01 RX ADMIN — TACROLIMUS 2 MG: 1 CAPSULE ORAL at 19:28

## 2023-01-01 RX ADMIN — ASPIRIN 81 MG 81 MG: 81 TABLET ORAL at 08:53

## 2023-01-01 RX ADMIN — PANTOPRAZOLE SODIUM 40 MG: 40 TABLET, DELAYED RELEASE ORAL at 09:11

## 2023-01-01 RX ADMIN — IPRATROPIUM BROMIDE AND ALBUTEROL 1 PUFF: 20; 100 SPRAY, METERED RESPIRATORY (INHALATION) at 15:21

## 2023-01-01 RX ADMIN — SODIUM CHLORIDE: 9 INJECTION, SOLUTION INTRAVENOUS at 08:53

## 2023-01-01 RX ADMIN — PROPOFOL 50 MG: 10 INJECTION, EMULSION INTRAVENOUS at 08:30

## 2023-01-01 RX ADMIN — SODIUM CHLORIDE: 9 INJECTION, SOLUTION INTRAVENOUS at 00:58

## 2023-01-01 RX ADMIN — MAGNESIUM SULFATE HEPTAHYDRATE 2 G: 40 INJECTION, SOLUTION INTRAVENOUS at 03:09

## 2023-01-01 RX ADMIN — ONDANSETRON 4 MG: 2 INJECTION INTRAMUSCULAR; INTRAVENOUS at 13:25

## 2023-01-01 RX ADMIN — IPRATROPIUM BROMIDE AND ALBUTEROL 1 PUFF: 20; 100 SPRAY, METERED RESPIRATORY (INHALATION) at 16:32

## 2023-01-01 RX ADMIN — BISACODYL 10 MG: 10 SUPPOSITORY RECTAL at 11:20

## 2023-01-01 RX ADMIN — METOPROLOL TARTRATE 50 MG: 50 TABLET, FILM COATED ORAL at 20:08

## 2023-01-01 RX ADMIN — SODIUM CHLORIDE: 9 INJECTION, SOLUTION INTRAVENOUS at 07:31

## 2023-01-01 RX ADMIN — POLYETHYLENE GLYCOL 3350 17 G: 17 POWDER, FOR SOLUTION ORAL at 10:10

## 2023-01-01 RX ADMIN — HYDRALAZINE HYDROCHLORIDE 10 MG: 10 TABLET, FILM COATED ORAL at 20:01

## 2023-01-01 RX ADMIN — TORSEMIDE 10 MG: 10 TABLET ORAL at 08:25

## 2023-01-01 RX ADMIN — TACROLIMUS 2 MG: 1 CAPSULE ORAL at 09:11

## 2023-01-01 RX ADMIN — PREDNISONE 5 MG: 5 TABLET ORAL at 08:03

## 2023-01-01 RX ADMIN — ISOSORBIDE MONONITRATE 60 MG: 60 TABLET, EXTENDED RELEASE ORAL at 09:29

## 2023-01-01 RX ADMIN — MAGNESIUM OXIDE TAB 400 MG (241.3 MG ELEMENTAL MG) 400 MG: 400 (241.3 MG) TAB at 08:25

## 2023-01-01 RX ADMIN — METOPROLOL TARTRATE 100 MG: 50 TABLET, FILM COATED ORAL at 08:07

## 2023-01-01 RX ADMIN — PANTOPRAZOLE SODIUM 40 MG: 40 TABLET, DELAYED RELEASE ORAL at 08:42

## 2023-01-01 RX ADMIN — TACROLIMUS 1.5 MG: 1 CAPSULE ORAL at 08:05

## 2023-01-01 RX ADMIN — TACROLIMUS 1.5 MG: 1 CAPSULE ORAL at 20:18

## 2023-01-01 RX ADMIN — METOPROLOL TARTRATE 100 MG: 50 TABLET, FILM COATED ORAL at 08:30

## 2023-01-01 RX ADMIN — PANTOPRAZOLE SODIUM 40 MG: 40 TABLET, DELAYED RELEASE ORAL at 06:40

## 2023-01-01 RX ADMIN — IPRATROPIUM BROMIDE AND ALBUTEROL 1 PUFF: 20; 100 SPRAY, METERED RESPIRATORY (INHALATION) at 12:11

## 2023-01-01 RX ADMIN — HEPARIN SODIUM 5000 UNITS: 5000 INJECTION, SOLUTION INTRAVENOUS; SUBCUTANEOUS at 09:11

## 2023-01-01 RX ADMIN — PHENYLEPHRINE HYDROCHLORIDE 200 MCG: 10 INJECTION INTRAVENOUS at 11:47

## 2023-01-01 RX ADMIN — SENNOSIDES AND DOCUSATE SODIUM 1 TABLET: 50; 8.6 TABLET ORAL at 21:20

## 2023-01-01 RX ADMIN — HYDRALAZINE HYDROCHLORIDE 10 MG: 10 TABLET, FILM COATED ORAL at 06:22

## 2023-01-01 RX ADMIN — HEPARIN SODIUM 5000 UNITS: 5000 INJECTION, SOLUTION INTRAVENOUS; SUBCUTANEOUS at 19:28

## 2023-01-01 RX ADMIN — ATORVASTATIN CALCIUM 80 MG: 80 TABLET, FILM COATED ORAL at 08:07

## 2023-01-01 RX ADMIN — SODIUM CHLORIDE, POTASSIUM CHLORIDE, SODIUM LACTATE AND CALCIUM CHLORIDE: 600; 310; 30; 20 INJECTION, SOLUTION INTRAVENOUS at 08:00

## 2023-01-01 RX ADMIN — DEXAMETHASONE 6 MG: 2 TABLET ORAL at 13:15

## 2023-01-01 RX ADMIN — HYDRALAZINE HYDROCHLORIDE 100 MG: 50 TABLET, FILM COATED ORAL at 22:26

## 2023-01-01 RX ADMIN — ASPIRIN 81 MG 81 MG: 81 TABLET ORAL at 08:45

## 2023-01-01 RX ADMIN — FLUTICASONE PROPIONATE 1 SPRAY: 50 SPRAY, METERED NASAL at 09:32

## 2023-01-01 RX ADMIN — ISOSORBIDE MONONITRATE 60 MG: 60 TABLET, EXTENDED RELEASE ORAL at 08:45

## 2023-01-01 ASSESSMENT — ACTIVITIES OF DAILY LIVING (ADL)
ADLS_ACUITY_SCORE: 24
ADLS_ACUITY_SCORE: 24
ADLS_ACUITY_SCORE: 25
ADLS_ACUITY_SCORE: 24
ADLS_ACUITY_SCORE: 27
ADLS_ACUITY_SCORE: 24
DRESSING/BATHING: BATHING DIFFICULTY, ASSISTANCE 1 PERSON
ADLS_ACUITY_SCORE: 26
ADLS_ACUITY_SCORE: 35
ADLS_ACUITY_SCORE: 22
ADLS_ACUITY_SCORE: 35
ADLS_ACUITY_SCORE: 36
ADLS_ACUITY_SCORE: 24
ADLS_ACUITY_SCORE: 30
ADLS_ACUITY_SCORE: 24
ADLS_ACUITY_SCORE: 25
ADLS_ACUITY_SCORE: 24
CHANGE_IN_FUNCTIONAL_STATUS_SINCE_ONSET_OF_CURRENT_ILLNESS/INJURY: YES
ADLS_ACUITY_SCORE: 35
ADLS_ACUITY_SCORE: 27
ADLS_ACUITY_SCORE: 27
ADLS_ACUITY_SCORE: 35
ADLS_ACUITY_SCORE: 24
ADLS_ACUITY_SCORE: 35
DRESS: 0-->INDEPENDENT
ADLS_ACUITY_SCORE: 30
ADLS_ACUITY_SCORE: 27
ADLS_ACUITY_SCORE: 24
ADLS_ACUITY_SCORE: 35
BATHING: 1-->ASSISTANCE NEEDED
ADLS_ACUITY_SCORE: 24
ADLS_ACUITY_SCORE: 30
ADLS_ACUITY_SCORE: 24
ADLS_ACUITY_SCORE: 37
ADLS_ACUITY_SCORE: 26
ADLS_ACUITY_SCORE: 35
ADLS_ACUITY_SCORE: 24
EQUIPMENT_CURRENTLY_USED_AT_HOME: WALKER, ROLLING
ADLS_ACUITY_SCORE: 30
ADLS_ACUITY_SCORE: 37
ADLS_ACUITY_SCORE: 28
ADLS_ACUITY_SCORE: 30
TRANSFERRING: 0-->INDEPENDENT
ADLS_ACUITY_SCORE: 24
ADLS_ACUITY_SCORE: 35
ADLS_ACUITY_SCORE: 24
ADLS_ACUITY_SCORE: 27
ADLS_ACUITY_SCORE: 24
ADLS_ACUITY_SCORE: 23
ADLS_ACUITY_SCORE: 37
ADLS_ACUITY_SCORE: 24
FALL_HISTORY_WITHIN_LAST_SIX_MONTHS: NO
DRESSING/BATHING_DIFFICULTY: YES
WALKING_OR_CLIMBING_STAIRS_DIFFICULTY: YES
ADLS_ACUITY_SCORE: 22
ADLS_ACUITY_SCORE: 24
ADLS_ACUITY_SCORE: 30
ADLS_ACUITY_SCORE: 30
ADLS_ACUITY_SCORE: 35
ADLS_ACUITY_SCORE: 24
ADLS_ACUITY_SCORE: 24
ADLS_ACUITY_SCORE: 35
TRANSFERRING: 0-->INDEPENDENT
WEAR_GLASSES_OR_BLIND: YES
ADLS_ACUITY_SCORE: 30
ADLS_ACUITY_SCORE: 33
ADLS_ACUITY_SCORE: 30
ADLS_ACUITY_SCORE: 24
WEAR_GLASSES_OR_BLIND: YES
DRESSING/BATHING_DIFFICULTY: NO
ADLS_ACUITY_SCORE: 35
ADLS_ACUITY_SCORE: 24
ADLS_ACUITY_SCORE: 24
TOILETING_ISSUES: NO;OTHER (SEE COMMENTS)
ADLS_ACUITY_SCORE: 24
ADLS_ACUITY_SCORE: 30
ADLS_ACUITY_SCORE: 24
ADLS_ACUITY_SCORE: 27
ADLS_ACUITY_SCORE: 24
ADLS_ACUITY_SCORE: 23
ADLS_ACUITY_SCORE: 24
ADLS_ACUITY_SCORE: 35
TRANSFERRING: 0-->INDEPENDENT
DOING_ERRANDS_INDEPENDENTLY_DIFFICULTY: YES
ADLS_ACUITY_SCORE: 23
ADLS_ACUITY_SCORE: 24
ADLS_ACUITY_SCORE: 37
ADLS_ACUITY_SCORE: 27
ADLS_ACUITY_SCORE: 33
ADLS_ACUITY_SCORE: 24
ADLS_ACUITY_SCORE: 35
ADLS_ACUITY_SCORE: 30
ADLS_ACUITY_SCORE: 30
ADLS_ACUITY_SCORE: 25
ADLS_ACUITY_SCORE: 30
ADLS_ACUITY_SCORE: 24
ADLS_ACUITY_SCORE: 35
ADLS_ACUITY_SCORE: 30
ADLS_ACUITY_SCORE: 22
TRANSFERRING: 0-->INDEPENDENT
ADLS_ACUITY_SCORE: 30
ADLS_ACUITY_SCORE: 23
ADLS_ACUITY_SCORE: 35
ADLS_ACUITY_SCORE: 27
ADLS_ACUITY_SCORE: 24
EQUIPMENT_CURRENTLY_USED_AT_HOME: WALKER, ROLLING
DIFFICULTY_EATING/SWALLOWING: NO
ADLS_ACUITY_SCORE: 27
WALKING_OR_CLIMBING_STAIRS_DIFFICULTY: YES
ADLS_ACUITY_SCORE: 24
DRESS: 0-->INDEPENDENT
TRANSFERRING: 0-->INDEPENDENT
VISION_MANAGEMENT: GLASSES
TOILETING_ISSUES: NO
ADLS_ACUITY_SCORE: 35
ADLS_ACUITY_SCORE: 35
ADLS_ACUITY_SCORE: 30
ADLS_ACUITY_SCORE: 24
ADLS_ACUITY_SCORE: 25
ADLS_ACUITY_SCORE: 26
ADLS_ACUITY_SCORE: 27
ADLS_ACUITY_SCORE: 30
ADLS_ACUITY_SCORE: 35
ADLS_ACUITY_SCORE: 24
ADLS_ACUITY_SCORE: 24
ADLS_ACUITY_SCORE: 36
ADLS_ACUITY_SCORE: 35
ADLS_ACUITY_SCORE: 24
ADLS_ACUITY_SCORE: 24
DEPENDENT_IADLS:: SHOPPING;TRANSPORTATION
ADLS_ACUITY_SCORE: 30
ADLS_ACUITY_SCORE: 35
ADLS_ACUITY_SCORE: 27
ADLS_ACUITY_SCORE: 22
CONCENTRATING,_REMEMBERING_OR_MAKING_DECISIONS_DIFFICULTY: NO
ADLS_ACUITY_SCORE: 27
ADLS_ACUITY_SCORE: 23
CONCENTRATING,_REMEMBERING_OR_MAKING_DECISIONS_DIFFICULTY: NO
ADLS_ACUITY_SCORE: 24
ADLS_ACUITY_SCORE: 23
ADLS_ACUITY_SCORE: 28
ADLS_ACUITY_SCORE: 30
ADLS_ACUITY_SCORE: 35
ADLS_ACUITY_SCORE: 24
ADLS_ACUITY_SCORE: 35
ADLS_ACUITY_SCORE: 24
VISION_MANAGEMENT: GLASSES
ADLS_ACUITY_SCORE: 35
ADLS_ACUITY_SCORE: 30
ADLS_ACUITY_SCORE: 22
ADLS_ACUITY_SCORE: 35
ADLS_ACUITY_SCORE: 24
WEAR_GLASSES_OR_BLIND: YES
WALKING_OR_CLIMBING_STAIRS: STAIR CLIMBING DIFFICULTY, ASSISTANCE 1 PERSON
DOING_ERRANDS_INDEPENDENTLY_DIFFICULTY: YES
ADLS_ACUITY_SCORE: 24
ADLS_ACUITY_SCORE: 30
ADLS_ACUITY_SCORE: 22
ADLS_ACUITY_SCORE: 24
ADLS_ACUITY_SCORE: 28
ADLS_ACUITY_SCORE: 24
ADLS_ACUITY_SCORE: 35
WALKING_OR_CLIMBING_STAIRS_DIFFICULTY: YES
ADLS_ACUITY_SCORE: 25
ADLS_ACUITY_SCORE: 35
ADLS_ACUITY_SCORE: 25
ADLS_ACUITY_SCORE: 30
DOING_ERRANDS_INDEPENDENTLY_DIFFICULTY: YES
CHANGE_IN_FUNCTIONAL_STATUS_SINCE_ONSET_OF_CURRENT_ILLNESS/INJURY: YES
ADLS_ACUITY_SCORE: 33
ADLS_ACUITY_SCORE: 36
ADLS_ACUITY_SCORE: 23
ADLS_ACUITY_SCORE: 35
ADLS_ACUITY_SCORE: 30
ADLS_ACUITY_SCORE: 33
CHANGE_IN_FUNCTIONAL_STATUS_SINCE_ONSET_OF_CURRENT_ILLNESS/INJURY: NO
ADLS_ACUITY_SCORE: 35
ADLS_ACUITY_SCORE: 30
ADLS_ACUITY_SCORE: 24
ADLS_ACUITY_SCORE: 24
ADLS_ACUITY_SCORE: 33
ADLS_ACUITY_SCORE: 35
ADLS_ACUITY_SCORE: 36
ADLS_ACUITY_SCORE: 24
DRESSING/BATHING_DIFFICULTY: NO
DIFFICULTY_EATING/SWALLOWING: NO
ADLS_ACUITY_SCORE: 24
ADLS_ACUITY_SCORE: 33
ADLS_ACUITY_SCORE: 35
ADLS_ACUITY_SCORE: 35
WALKING_OR_CLIMBING_STAIRS: STAIR CLIMBING DIFFICULTY, ASSISTANCE 1 PERSON
ADLS_ACUITY_SCORE: 24
ADLS_ACUITY_SCORE: 30
ADLS_ACUITY_SCORE: 24
ADLS_ACUITY_SCORE: 23
ADLS_ACUITY_SCORE: 25
ADLS_ACUITY_SCORE: 27
ADLS_ACUITY_SCORE: 30
ADLS_ACUITY_SCORE: 24
ADLS_ACUITY_SCORE: 33
CONCENTRATING,_REMEMBERING_OR_MAKING_DECISIONS_DIFFICULTY: NO
ADLS_ACUITY_SCORE: 35
ADLS_ACUITY_SCORE: 23
ADLS_ACUITY_SCORE: 26
ADLS_ACUITY_SCORE: 35
ADLS_ACUITY_SCORE: 27
ADLS_ACUITY_SCORE: 24
TOILETING_ISSUES: NO
ADLS_ACUITY_SCORE: 24
ADLS_ACUITY_SCORE: 24
ADLS_ACUITY_SCORE: 28
ADLS_ACUITY_SCORE: 35
ADLS_ACUITY_SCORE: 24
ADLS_ACUITY_SCORE: 27
ADLS_ACUITY_SCORE: 35
ADLS_ACUITY_SCORE: 24
FALL_HISTORY_WITHIN_LAST_SIX_MONTHS: NO
ADLS_ACUITY_SCORE: 23
ADLS_ACUITY_SCORE: 24
ADLS_ACUITY_SCORE: 23
DIFFICULTY_EATING/SWALLOWING: NO
ADLS_ACUITY_SCORE: 26
ADLS_ACUITY_SCORE: 30
ADLS_ACUITY_SCORE: 24
ADLS_ACUITY_SCORE: 33
ADLS_ACUITY_SCORE: 24
ADLS_ACUITY_SCORE: 33
ADLS_ACUITY_SCORE: 24
ADLS_ACUITY_SCORE: 35
ADLS_ACUITY_SCORE: 27
ADLS_ACUITY_SCORE: 27
ADLS_ACUITY_SCORE: 24
ADLS_ACUITY_SCORE: 33
ADLS_ACUITY_SCORE: 24
WALKING_OR_CLIMBING_STAIRS: STAIR CLIMBING DIFFICULTY, REQUIRES EQUIPMENT;STAIR CLIMBING DIFFICULTY, ASSISTANCE 1 PERSON
ADLS_ACUITY_SCORE: 24
TRANSFERRING: 0-->INDEPENDENT
ADLS_ACUITY_SCORE: 33
ADLS_ACUITY_SCORE: 33
ADLS_ACUITY_SCORE: 27

## 2023-01-01 ASSESSMENT — PAIN SCALES - GENERAL
PAINLEVEL: NO PAIN (0)

## 2023-01-01 ASSESSMENT — LIFESTYLE VARIABLES: TOBACCO_USE: 1

## 2023-01-01 ASSESSMENT — VISUAL ACUITY: OU: NORMAL ACUITY

## 2023-01-01 ASSESSMENT — COPD QUESTIONNAIRES: COPD: 1

## 2023-01-02 RX ORDER — ISOSORBIDE MONONITRATE 60 MG/1
60 TABLET, EXTENDED RELEASE ORAL DAILY
Qty: 90 TABLET | Refills: 0 | Status: SHIPPED | OUTPATIENT
Start: 2023-01-02 | End: 2023-01-01

## 2023-01-02 NOTE — TELEPHONE ENCOUNTER
LCV: 7/27/2022  [facility] [place]  NCV: 1-25-23  BP above protocol - FYI to clinic  RF 90 day    BP Readings from Last 3 Encounters:   11/28/22 (!) 144/87   08/04/22 100/65   07/28/22 103/75

## 2023-01-05 ENCOUNTER — TELEPHONE (OUTPATIENT)
Dept: GASTROENTEROLOGY | Facility: CLINIC | Age: 73
End: 2023-01-05

## 2023-01-05 NOTE — TELEPHONE ENCOUNTER
Screening Questions  BLUE  KIND OF PREP RED  LOCATION [review exclusion criteria] GREEN  SEDATION TYPE        Y Are you active on mychart?       Lisa Martinez, DO  Ordering/Referring Provider?        [employer] What type of coverage do you have?      N Have you had a positive covid test in the last 14 days?     16.3 1. BMI  [BMI 40+ - review exclusion criteria]    Y  2. Are you able to give consent for your medical care? [IF NO,RN REVIEW]        N  3. Are you taking any prescription pain medications on a routine schedule?        3a. EXTENDED PREP What kind of prescription?     N 4. Do you have any chemical dependencies such as alcohol, street drugs, or methadone?    Y - anxiety 5. Do you have any history of post-traumatic stress syndrome, severe anxiety or history of psychosis?      **If yes 3- 5 , please schedule with MAC sedation.**          IF YES TO ANY 6 - 10 - HOSPITAL SETTING ONLY.     N 6.   Do you need assistance transferring?     N 7.   Have you had a heart or lung transplant?    N 8.   Are you currently on dialysis?   N 9.   Do you use daily home oxygen?   N 10. Do you take nitroglycerin?   10a.  If yes, how often?     11. [FEMALES]  N Are you currently pregnant?    11a.  If yes, how many weeks? [ Greater than 12 weeks, OR NEEDED]    N 12. Do you have Pulmonary Hypertension? *NEED PAC APPT AT [facility]*     N 13. [review exclusion criteria]  Do you have any implantable devices in your body (pacemaker, defib, LVAD)?    N 14. In the past 6 months, have you had any heart related issues including cardiomyopathy or heart attack?     14a.  If yes, did it require cardiac stenting if so when?     N 15. Have you had a stroke or Transient ischemic attack (TIA - aka  mini stroke ) within 6 months?      N 16. Do you have mod to severe Obstructive Sleep Apnea?  [Hospital only]    N 17. Do you have SEVERE AND UNCONTROLLED asthma? *NEED PAC APPT AT [facility]*     Y 18. Are you currently taking any blood thinners?     18a. If  "yes, inform patient to \"follow up w/ ordering provider for bridging instructions.\"    N 19. Do you take the medication Phentermine?    19a. If yes, \"Hold for 7 days before procedure.  Please consult your prescribing provider if you have questions about holding this medication.\"     N  20. Do you have chronic kidney disease?      N  21. Do you have a diagnosis of diabetes?       22. On a regular basis do you go 3-5 days between bowel movements?      23. Preferred LOCAL Pharmacy for Pre Prescription    [ LIST ONLY ONE PHARMACY]        [facility]  [facility] - [place], [place] - [address] [facility] [place] - [address] [place] [address] [address]  [id number] - [place], [place] - [address] [place] [address] & [address] [id number] - [place], [place] - [address] [place] [address] AT [address] [place] [address] & [facility]        - CLOSING REMINDERS -    Informed patient they will need an adult    Cannot take any type of public or medical transportation alone    Conscious Sedation- Needs  for 6 hours after the procedure       MAC/General-Needs  for 24 hours after procedure    Pre-Pro[facility] be [facility]]    Confirmed Nurse will call to complete assessment       - SCHEDULING DETAILS -  Yes Hospital Setting Required? If yes, what is the exclusion?: needs hospital setting per patient   Yan  Surgeon    02/02/23  Date of Procedure  Upper Endoscopy [EGD]  Type of Procedure Scheduled  [facility] [place] [facility]   Which Colonoscopy Prep was Sent?     MAC - anxiety Sedation Type     Y PAC / Pre-op Required                 "

## 2023-01-18 ENCOUNTER — TELEPHONE (OUTPATIENT)
Dept: TRANSPLANT | Facility: CLINIC | Age: 73
End: 2023-01-18
Payer: COMMERCIAL

## 2023-01-18 NOTE — TELEPHONE ENCOUNTER
Switching from Sirolimus to  mg BID    Mychart message sent if still has a few weeks worth of Sirolimus.

## 2023-01-19 ENCOUNTER — TELEPHONE (OUTPATIENT)
Dept: GASTROENTEROLOGY | Facility: CLINIC | Age: 73
End: 2023-01-19

## 2023-01-19 NOTE — TELEPHONE ENCOUNTER
Attempted to contact patient regarding upcoming Upper endoscopy (EGD) procedure on 2/2/23 for pre assessment questions. No answer.     Left message to return call to [phone] #4    Discuss Covid policy.     Pre op exam scheduled: Yes 1/25/23 with Lisa Martinez DO. Please ensure pt is aware this is REQUIRED prior to procedure    Arrival time: 0935    Facility location: [facility]; [facility] [place] [address]    Sedation type: MAC    Anticoagulants: Yes Clopidogrel (Plavix). Holding interval of 5 days    Electronic implanted devices? No    Diabetic? No    Indication for procedure: Globus sensation & GERD    Prep instructions sent via [employer].     Perla Retana RN  Endoscopy Procedure Pre Assessment RN

## 2023-01-23 DIAGNOSIS — I50.9 CONGESTIVE HEART FAILURE, UNSPECIFIED HF CHRONICITY, UNSPECIFIED HEART FAILURE TYPE (H): Primary | ICD-10-CM

## 2023-01-23 DIAGNOSIS — D50.9 IRON DEFICIENCY ANEMIA, UNSPECIFIED IRON DEFICIENCY ANEMIA TYPE: ICD-10-CM

## 2023-01-23 DIAGNOSIS — Z94.0 IMMUNOSUPPRESSIVE MANAGEMENT ENCOUNTER FOLLOWING KIDNEY TRANSPLANT: ICD-10-CM

## 2023-01-23 DIAGNOSIS — Z48.298 AFTERCARE FOLLOWING ORGAN TRANSPLANT: ICD-10-CM

## 2023-01-23 DIAGNOSIS — Z79.899 IMMUNOSUPPRESSIVE MANAGEMENT ENCOUNTER FOLLOWING KIDNEY TRANSPLANT: ICD-10-CM

## 2023-01-23 DIAGNOSIS — Z94.0 KIDNEY TRANSPLANTED: Primary | ICD-10-CM

## 2023-01-23 RX ORDER — MYCOPHENOLIC ACID 180 MG/1
360 TABLET, DELAYED RELEASE ORAL 2 TIMES DAILY
Qty: 120 TABLET | Refills: 11 | Status: SHIPPED | OUTPATIENT
Start: 2023-01-23 | End: 2023-01-01 | Stop reason: ALTCHOICE

## 2023-01-23 NOTE — TELEPHONE ENCOUNTER
Pre assessment questions completed for upcoming Upper endoscopy (EGD) procedure scheduled on 2/2/2023    COVID policy reviewed.     Reviewed procedural arrival time 0935 and facility location [facility]; [address] [place] [address]    Designated  policy reviewed. Instructed to have someone stay 24 hours post procedure.     Anticoagulation/blood thinners? Yes Clopidogrel (Plavix). Holding interval of 5 days    Electronic implanted devices? No    Diabetic? No    Reviewed procedure prep instructions.     Patient verbalized understanding and had no questions or concerns at this time.    Chandni Lawrence RN  Endoscopy Procedure Pre Assessment RN

## 2023-01-25 ENCOUNTER — OFFICE VISIT (OUTPATIENT)
Dept: CARDIOLOGY | Facility: CLINIC | Age: 73
End: 2023-01-25
Attending: NURSE PRACTITIONER
Payer: COMMERCIAL

## 2023-01-25 ENCOUNTER — LAB (OUTPATIENT)
Dept: LAB | Facility: CLINIC | Age: 73
End: 2023-01-25
Payer: COMMERCIAL

## 2023-01-25 ENCOUNTER — OFFICE VISIT (OUTPATIENT)
Dept: FAMILY MEDICINE | Facility: CLINIC | Age: 73
End: 2023-01-25
Payer: COMMERCIAL

## 2023-01-25 VITALS
WEIGHT: 89.8 LBS | TEMPERATURE: 95.5 F | BODY MASS INDEX: 16.95 KG/M2 | RESPIRATION RATE: 18 BRPM | DIASTOLIC BLOOD PRESSURE: 92 MMHG | SYSTOLIC BLOOD PRESSURE: 141 MMHG | OXYGEN SATURATION: 98 % | HEART RATE: 57 BPM | HEIGHT: 61 IN

## 2023-01-25 VITALS
BODY MASS INDEX: 17.48 KG/M2 | WEIGHT: 92.5 LBS | SYSTOLIC BLOOD PRESSURE: 171 MMHG | OXYGEN SATURATION: 91 % | HEART RATE: 62 BPM | DIASTOLIC BLOOD PRESSURE: 99 MMHG

## 2023-01-25 DIAGNOSIS — Z01.818 PREOP GENERAL PHYSICAL EXAM: ICD-10-CM

## 2023-01-25 DIAGNOSIS — D84.9 IMMUNOSUPPRESSION (H): ICD-10-CM

## 2023-01-25 DIAGNOSIS — Z48.298 AFTERCARE FOLLOWING ORGAN TRANSPLANT: ICD-10-CM

## 2023-01-25 DIAGNOSIS — R09.A2 GLOBUS SENSATION: ICD-10-CM

## 2023-01-25 DIAGNOSIS — Z01.818 PREOP GENERAL PHYSICAL EXAM: Primary | ICD-10-CM

## 2023-01-25 DIAGNOSIS — Z86.718 HISTORY OF DVT (DEEP VEIN THROMBOSIS): ICD-10-CM

## 2023-01-25 DIAGNOSIS — Z94.0 KIDNEY REPLACED BY TRANSPLANT: ICD-10-CM

## 2023-01-25 DIAGNOSIS — D50.9 IRON DEFICIENCY ANEMIA, UNSPECIFIED IRON DEFICIENCY ANEMIA TYPE: ICD-10-CM

## 2023-01-25 DIAGNOSIS — Z94.0 KIDNEY TRANSPLANTED: ICD-10-CM

## 2023-01-25 DIAGNOSIS — I25.111 CORONARY ARTERY DISEASE INVOLVING NATIVE CORONARY ARTERY OF NATIVE HEART WITH ANGINA PECTORIS WITH DOCUMENTED SPASM (H): ICD-10-CM

## 2023-01-25 DIAGNOSIS — I50.9 CONGESTIVE HEART FAILURE, UNSPECIFIED HF CHRONICITY, UNSPECIFIED HEART FAILURE TYPE (H): ICD-10-CM

## 2023-01-25 DIAGNOSIS — Z94.0 IMMUNOSUPPRESSIVE MANAGEMENT ENCOUNTER FOLLOWING KIDNEY TRANSPLANT: ICD-10-CM

## 2023-01-25 DIAGNOSIS — K21.00 GASTROESOPHAGEAL REFLUX DISEASE WITH ESOPHAGITIS, UNSPECIFIED WHETHER HEMORRHAGE: ICD-10-CM

## 2023-01-25 DIAGNOSIS — Z79.899 IMMUNOSUPPRESSIVE MANAGEMENT ENCOUNTER FOLLOWING KIDNEY TRANSPLANT: ICD-10-CM

## 2023-01-25 LAB
ANION GAP SERPL CALCULATED.3IONS-SCNC: 10 MMOL/L (ref 7–15)
BUN SERPL-MCNC: 43.3 MG/DL (ref 8–23)
CALCIUM SERPL-MCNC: 9.5 MG/DL (ref 8.8–10.2)
CHLORIDE SERPL-SCNC: 102 MMOL/L (ref 98–107)
CREAT SERPL-MCNC: 1.95 MG/DL (ref 0.51–0.95)
DEPRECATED HCO3 PLAS-SCNC: 28 MMOL/L (ref 22–29)
ERYTHROCYTE [DISTWIDTH] IN BLOOD BY AUTOMATED COUNT: 14.4 % (ref 10–15)
FERRITIN SERPL-MCNC: 410 NG/ML (ref 11–328)
GFR SERPL CREATININE-BSD FRML MDRD: 27 ML/MIN/1.73M2
GLUCOSE SERPL-MCNC: 113 MG/DL (ref 70–99)
HCT VFR BLD AUTO: 35.5 % (ref 35–47)
HGB BLD-MCNC: 11.2 G/DL (ref 11.7–15.7)
IRON BINDING CAPACITY (ROCHE): 228 UG/DL (ref 240–430)
IRON SATN MFR SERPL: 20 % (ref 15–46)
IRON SERPL-MCNC: 45 UG/DL (ref 37–145)
MCH RBC QN AUTO: 28.4 PG (ref 26.5–33)
MCHC RBC AUTO-ENTMCNC: 31.5 G/DL (ref 31.5–36.5)
MCV RBC AUTO: 90 FL (ref 78–100)
PLATELET # BLD AUTO: 201 10E3/UL (ref 150–450)
POTASSIUM SERPL-SCNC: 3.9 MMOL/L (ref 3.4–5.3)
RBC # BLD AUTO: 3.94 10E6/UL (ref 3.8–5.2)
SIROLIMUS BLD-MCNC: 10.9 UG/L (ref 5–15)
SODIUM SERPL-SCNC: 140 MMOL/L (ref 136–145)
TME LAST DOSE: NORMAL H
TME LAST DOSE: NORMAL H
WBC # BLD AUTO: 7.1 10E3/UL (ref 4–11)

## 2023-01-25 PROCEDURE — 36415 COLL VENOUS BLD VENIPUNCTURE: CPT | Performed by: PATHOLOGY

## 2023-01-25 PROCEDURE — 83550 IRON BINDING TEST: CPT | Performed by: PATHOLOGY

## 2023-01-25 PROCEDURE — G0463 HOSPITAL OUTPT CLINIC VISIT: HCPCS

## 2023-01-25 PROCEDURE — 83540 ASSAY OF IRON: CPT | Performed by: PATHOLOGY

## 2023-01-25 PROCEDURE — 80195 ASSAY OF SIROLIMUS: CPT | Mod: 90 | Performed by: PATHOLOGY

## 2023-01-25 PROCEDURE — 85027 COMPLETE CBC AUTOMATED: CPT | Performed by: PATHOLOGY

## 2023-01-25 PROCEDURE — G0463 HOSPITAL OUTPT CLINIC VISIT: HCPCS | Performed by: NURSE PRACTITIONER

## 2023-01-25 PROCEDURE — 93000 ELECTROCARDIOGRAM COMPLETE: CPT | Performed by: FAMILY MEDICINE

## 2023-01-25 PROCEDURE — 82728 ASSAY OF FERRITIN: CPT | Mod: 90 | Performed by: PATHOLOGY

## 2023-01-25 PROCEDURE — 99000 SPECIMEN HANDLING OFFICE-LAB: CPT | Performed by: PATHOLOGY

## 2023-01-25 PROCEDURE — 99214 OFFICE O/P EST MOD 30 MIN: CPT | Performed by: NURSE PRACTITIONER

## 2023-01-25 PROCEDURE — 80048 BASIC METABOLIC PNL TOTAL CA: CPT | Performed by: PATHOLOGY

## 2023-01-25 PROCEDURE — 99214 OFFICE O/P EST MOD 30 MIN: CPT | Performed by: FAMILY MEDICINE

## 2023-01-25 PROCEDURE — 80180 DRUG SCRN QUAN MYCOPHENOLATE: CPT | Mod: 90 | Performed by: PATHOLOGY

## 2023-01-25 RX ORDER — METOPROLOL TARTRATE 100 MG
100 TABLET ORAL DAILY
Qty: 180 TABLET | Refills: 2 | COMMUNITY
Start: 2023-01-25 | End: 2023-01-01

## 2023-01-25 ASSESSMENT — PAIN SCALES - GENERAL
PAINLEVEL: NO PAIN (0)
PAINLEVEL: NO PAIN (0)

## 2023-01-25 NOTE — PATIENT INSTRUCTIONS
For informational purposes only. Not to replace the advice of your health care provider. Copyright   2003,  [facility] [employer] [facility] [facility]. All rights reserved. Clinically reviewed by Lucinda García MD. [employer] [id number] - REV .  Preparing for Your Surgery  Getting started  A nurse will call you to review your health history and instructions. They will give you an arrival time based on your scheduled surgery time. Please be ready to share:    Your doctor's clinic name and phone number    Your medical, surgical, and anesthesia history    A list of allergies and sensitivities    A list of medicines, including herbal treatments and over-the-counter drugs    Whether the patient has a legal guardian (ask how to send us the papers in advance)  Please tell us if you're pregnant--or if there's any chance you might be pregnant. Some surgeries may injure a fetus (unborn baby), so they require a pregnancy test. Surgeries that are safe for a fetus don't always need a test, and you can choose whether to have one.   If you have a child who's having surgery, please ask for a copy of Preparing for Your Child's Surgery.    Preparing for surgery    Within 10 to 30 days of surgery: Have a pre-op exam (sometimes called an H&P, or History and Physical). This can be done at a clinic or pre-operative center.  ? If you're having a , you may not need this exam. Talk to your care team.    At your pre-op exam, talk to your care team about all medicines you take. If you need to stop any medicines before surgery, ask when to start taking them again.  ? We do this for your safety. Many medicines can make you bleed too much during surgery. Some change how well surgery (anesthesia) drugs work.    Call your insurance company to let them know you're having surgery. (If you don't have insurance, call [phone].)    Call your clinic if there's any change in your health. This includes signs of a cold or flu (sore throat, runny nose,  cough, rash, fever). It also includes a scrape or scratch near the surgery site.    If you have questions on the day of surgery, call your hospital or surgery center.  Eating and drinking guidelines  For your safety: Unless your surgeon tells you otherwise, follow the guidelines below.    Eat and drink as usual until 8 hours before you arrive for surgery. After that, no food or milk.    Drink clear liquids until 2 hours before you arrive. These are liquids you can see through, like water, Gatorade, and Propel Water. They also include plain black coffee and tea (no cream or milk), candy, and breath mints. You can spit out gum when you arrive.    If you drink alcohol: Stop drinking it the night before surgery.    If your care team tells you to take medicine on the morning of surgery, it's okay to take it with a sip of water.  Preventing infection    Shower or bathe the night before and morning of your surgery. Follow the instructions your clinic gave you. (If no instructions, use regular soap.)    Don't shave or clip hair near your surgery site. We'll remove the hair if needed.    Don't smoke or vape the morning of surgery. You may chew nicotine gum up to 2 hours before surgery. A nicotine patch is okay.  ? Note: Some surgeries require you to completely quit smoking and nicotine. Check with your surgeon.    Your care team will make every effort to keep you safe from infection. We will:  ? Clean our hands often with soap and water (or an alcohol-based hand rub).  ? Clean the skin at your surgery site with a special soap that kills germs.  ? Give you a special gown to keep you warm. (Cold raises the risk of infection.)  ? Wear special hair covers, masks, gowns and gloves during surgery.  ? Give antibiotic medicine, if prescribed. Not all surgeries need antibiotics.  What to bring on the day of surgery    Photo ID and insurance card    Copy of your health care directive, if you have one    Glasses and hearing aids (bring  cases)  ? You can't wear contacts during surgery    Inhaler and eye drops, if you use them (tell us about these when you arrive)    CPAP machine or breathing device, if you use them    A few personal items, if spending the night    If you have . . .  ? A pacemaker, ICD (cardiac defibrillator) or other implant: Bring the ID card.  ? An implanted stimulator: Bring the remote control.  ? A legal guardian: Bring a copy of the certified (court-stamped) guardianship papers.  Please remove any jewelry, including body piercings. Leave jewelry and other valuables at home.  If you're going home the day of surgery    You must have a responsible adult drive you home. They should stay with you overnight as well.    If you don't have someone to stay with you, and you aren't safe to go home alone, we may keep you overnight. Insurance often won't pay for this.  After surgery  If it's hard to control your pain or you need more pain medicine, please call your surgeon's office.  Questions?   If you have any questions for your care team, list them here: _________________________________________________________________________________________________________________________________________________________________________ ____________________________________ ____________________________________ ____________________________________

## 2023-01-25 NOTE — NURSING NOTE
Diet: Patient instructed regarding a heart failure healthy diet, including discussion of reduced fat and 2000 mg daily sodium restriction, daily weights, medication purpose and compliance, fluid restrictions and resources for patient and family to access for assistance with heart failure management.       Labs: Patient was given results of the laboratory testing obtained today and patient was instructed about when to return for the next laboratory testing.     Med Reconcile: Reviewed and verified all current medications with the patient. The updated medication list was printed and given to the patient. DECREASE metoprolol to 100mg once daily.     Return Appointment: Patient given instructions regarding scheduling next clinic visit. RTC in 1 week for CORE    Patient stated she understood all health information given and agreed to call with further questions or concerns.     Carolee Gar RN

## 2023-01-25 NOTE — RESULT ENCOUNTER NOTE
Dear Maribel,   Your test results are all back -   -Hemoglobin is decreased indicating anemia.  This is just borderline.   Plan to recheck again in 1 month.  -White blood cell and platelet counts are normal.  Let us know if you have any questions.  -Lisa Martinez, DO

## 2023-01-25 NOTE — PROGRESS NOTES
[facility]  [address], [address]  [facility] [address]  Phone: [phone]  Primary Provider: Lisa Garcia  Pre-op Performing Provider: LISA GARCIA       PREOPERATIVE EVALUATION:  Today's date: 1/25/2023    Maribel Yang is a 72 year old female who presents for a preoperative evaluation.    Surgical Information:  Surgery/Procedure: ESOPHAGOGASTRODUODENOSCOPY  Surgery Location: [facility]  Surgeon: Yazan Heredia MD  Surgery Date: 2/02/2023  Time of Surgery: 10:35am  Where patient plans to recover: At home with family  Fax number for surgical facility: Note does not need to be faxed, will be available electronically in Epic.    Type of Anesthesia Anticipated: to be determined    Assessment & Plan     The proposed surgical procedure is considered LOW risk.    Preop general physical exam     - EKG 12-lead complete w/read - Clinics  - CBC with platelets; Future    Gastroesophageal reflux disease with esophagitis, unspecified whether hemorrhage  Worsening GERD symptoms with globus not responding to PPI -   Now requiring EGD for further evaluation  - CBC with platelets; Future    Globus sensation   as above   - CBC with platelets; Future    Kidney replaced by transplant  Hx of transplant -   On daily prednisone and sirolimus (current medications)  - CBC with platelets; Future    Coronary artery disease involving native coronary artery of native heart with angina pectoris with documented spasm (H)   stable for 2 years -   - CBC with platelets; Future    Immunosuppression (H)  As above   - CBC with platelets; Future    History of DVT (deep vein thrombosis)  On ASA daily and plavix for CAD -  - CBC with platelets; Future    Congestive heart failure, unspecified HF chronicity, unspecified heart failure type (H)     - CBC with platelets; Future           Risks and Recommendations:  The patient has the following additional risks and recommendations  for perioperative complications:   - Recurrent use of steroids  Cardiovascular:  Stable since 2021    Medication Instructions:   - aspirin:Continue the ASA 81mg daily - due to cardiac risk    - clopidrogel (Plavix), prasugrel (Effient), ticagrelor (Brilinta): No contraindication to stopping Plavix, HOLD 5-7 days before surgery.    - ACE/ARB: May be continued on the day of surgery.    - Beta Blockers: Continue taking on the day of surgery.   - Diuretics: HOLD on the day of surgery.   - Statins: Continue taking on the day of surgery.    - pt on sirolimus currently but in 2 weeks ( 1 week after procedure)  will switch to mycophenolate    - Take usual dose on day of surgery    RECOMMENDATION:  APPROVAL GIVEN to proceed with proposed procedure, without further diagnostic evaluation.            Subjective     HPI related to upcoming procedure: worsening GERD and globus symptoms    Preop Questions 1/23/2023   1. Have you ever had a heart attack or stroke? YES - April 2021- stable since   2. Have you ever had surgery on your heart or blood vessels, such as a stent placement, a coronary artery bypass, or surgery on an artery in your head, neck, heart, or legs? YES - stent in RCA during AAA repair   3. Do you have chest pain with activity? No   4. Do you have a history of  heart failure? YES - stable but due to CAD and AAA   5. Do you currently have a cold, bronchitis or symptoms of other infection? No   6. Do you have a cough, shortness of breath, or wheezing? YES - stable    7. Do you or anyone in your family have previous history of blood clots? YES - hx of DVT in 2020   8. Do you or does anyone in your family have a serious bleeding problem such as prolonged bleeding following surgeries or cuts? UNKNOWN -  No previous issues    9. Have you ever had problems with anemia or been told to take iron pills? YES - iron def anemia   10. Have you had any abnormal blood loss such as black, tarry or bloody stools, or abnormal  vaginal bleeding? No   11. Have you ever had a blood transfusion? YES - unclear about details    11a. Have you ever had a transfusion reaction? YES - had antibody to RBC antigens -    12. Are you willing to have a blood transfusion if it is medically needed before, during, or after your surgery? Yes   13. Have you or any of your relatives ever had problems with anesthesia? No   14. Do you have sleep apnea, excessive snoring or daytime drowsiness? No   15. Do you have any artifical heart valves or other implanted medical devices like a pacemaker, defibrillator, or continuous glucose monitor? No   16. Do you have artificial joints? No   17. Are you allergic to latex? No   18. Is there any chance that you may be pregnant? -       Health Care Directive:  Patient does not have a Health Care Directive or Living Will:     Preoperative Review of :   reviewed - no record of controlled substances prescribed.       Status of Chronic Conditions:  See problem list for active medical problems.  Problems all longstanding and stable, except as noted/documented.  See ROS for pertinent symptoms related to these conditions.      Review of Systems  CONSTITUTIONAL:no weigh gain, fever or chills  INTEGUMENTARY/SKIN: NEGATIVE for worrisome rashes, moles or lesions  EYES: NEGATIVE for vision changes or irritation  ENT/MOUTH: NEGATIVE for ear, mouth and throat problems  RESP: NEGATIVE for significant cough or SOB  CV: NEGATIVE for chest pain, palpitations or peripheral edema  GI: POSITIVE for GERD and globus   : NEGATIVE for frequency, dysuria, or hematuria  MUSCULOSKELETAL: NEGATIVE for significant arthralgias or myalgia  NEURO: NEGATIVE for weakness, dizziness or paresthesias  ENDOCRINE: NEGATIVE for temperature intolerance, skin/hair changes  HEME: NEGATIVE for bleeding problems  PSYCHIATRIC: NEGATIVE for changes in mood or affect    Patient Active Problem List    Diagnosis Date Noted     Anemia, iron deficiency 06/30/2022      Priority: Medium     Mild protein-calorie malnutrition (H) 05/03/2022     Priority: Medium     Coronary artery disease involving native coronary artery with angina pectoris with documented spasm (H) 06/25/2021     Priority: Medium     Occurred during hospitalization for AAA repair   Had RCA stent placed and started on Brillenta       Congestive heart failure, unspecified HF chronicity, unspecified heart failure type (H) 06/22/2021     Priority: Medium     Physical deconditioning 05/02/2021     Priority: Medium     Hematoma 04/23/2021     Priority: Medium     Abdominal aortic aneurysm (AAA) without rupture 03/26/2021     Priority: Medium     Added automatically from request for surgery [id number]       Chronic kidney disease, stage 3 (H) 03/21/2021     Priority: Medium     Acute deep vein thrombosis (DVT) of proximal vein of lower extremity, unspecified laterality (H) 05/29/2020     Priority: Medium     Dx 3/2020  Rx for Eliquis but financial issues currently so on Lovenox.  Plan to restart when she can afford  Stopped the Plavix but continuing her Pletal       Age-related osteoporosis without current pathological fracture 06/22/2019     Priority: Medium     Aftercare following organ transplant 05/05/2018     Priority: Medium     Malignant neoplasm of anal canal (H) 04/09/2018     Priority: Medium     Cherry angioma 06/12/2016     Priority: Medium     Skin cancer screening 06/12/2016     Priority: Medium     Intertrigo 06/12/2016     Priority: Medium     History of basal cell carcinoma 06/12/2016     Priority: Medium     Alopecia 10/27/2015     Priority: Medium     Vaginal dysplasia 04/02/2015     Priority: Medium     Lumbago 10/16/2014     Priority: Medium     Squamous cell lung cancer (H)      Priority: Medium     Following with oncology   Had radiation therapy - 3 sessions  F/u CT completed  PET scheduled 6/16/14       Peripheral artery disease (H) 01/16/2014     Priority: Medium     YUNIOR III (vulvar intraepithelial  neoplasia III) 09/03/2013     Priority: Medium     History of basal cell carcinoma 05/24/2013     Priority: Medium     Immunosuppression (H)      Priority: Medium     HTN, kidney transplant related      Priority: Medium     CARDIOVASCULAR SCREENING; LDL GOAL LESS THAN 100 10/31/2010     Priority: Medium     S/P LEEP of cervix 03/11/2008     Priority: Medium     1/07: + HPV 6 Low risk  10/07: ASCUS, + HPV 56, 54 & 6. 12/07: [place]: TAL II  3/11/08: LEEP - TAL II  8/08: ASCUS, ECC atypia, +HPV 82  9/08: [place] - Atypia  5/09: NIL pap, 11/09: NIL pap, neg HPV  4/13: LSIL, + HPV 33 or 45, 61. 5/15/13: [place] - VAIN II & III,TAL II. Referred to gyn onc.   6/20/13: [place] with gyn onc. Plan LEEP and CO2 laser to vagina, cx and vaginal vault. Reminder done  7/17/13: Anal Microscopy, EUA vagina,Colposcopy Of Vagina And Vulva, Vaginal Biopsies, Omniguide Co2 Laser To Vagina and vulva, Loop Electrosurgical Excision Procedure To Cervix- YUNIOR 1,2 & 3: AIN 2, VAIN 2, Leep bx benign Plan colp at gyn onc in 4 months. Reminder sent.  12/2/13: ASC H, + HPV 33/45, HSIL anal pap, YUNIOR 1 & 2, AIN 1 & 3. F/u deferred due to lung ca radiation  5/8/14: ASCUS, + HPV 33/45. 5/22/14: consult with Dr. Renteria, surgery planned in reminders  7/15/14: surgery-Exam under anesthesia, vulva and vaginal colposcopy, vulvar biopsy, CO2 laser of the vulva, vaginal pap smear - Pap LSIL, + HPV 33/45. Vulvar bx HSIL Plan repeat colp and pap with NP at gyn onc.  9/17/15: [place] with gyn onc. Due for repeat colp 3 -4 months. Tracking started.  5/26/16: Pap with vaginal Bx--Atyp/YUNIOR 1. Plan: Endometrial Bx  6/28/16: EMB, ECC--TAL 3. Plan: LEEP  8/17/16: LEEP--TAL 1, YUNIOR 1. Plan: Pap and colposcopy 6mo, due 2/17/17.  06/01/17 Pap--NIL/+HR HPV. [place]--negative. Plan: repeat colp and pap in 6mo.  12/14/17 [place]--visually normal, no Bx. Plan: colp & pap in 6mo.       Kidney replaced by transplant 10/21/2004     Priority: Medium     Living donor recipient  Alports syndrome        Other specified congenital anomalies 04/14/2004     Priority: Medium      Past Medical History:   Diagnosis Date     Abnormal coagulation profile     p [id number] heterozygote      Age-related osteoporosis without current pathological fracture 06/22/2019     Anemia      Antiplatelet or antithrombotic long-term use      ASCUS with positive high risk HPV 2007, 2015    + HPV 56, 54,& 6, colp - TAL III, Leep =TAL II     Basal cell carcinoma      Congestive heart failure, unspecified HF chronicity, unspecified heart failure type (H) 06/22/2021     COPD (chronic obstructive pulmonary disease) (H)      Depressive disorder 07/2015     Hypertension      Immunosuppressed status (H)     due meds     Kidney replaced by transplant 09/2004    Living donor recipient,  Rejection 7/2005     LSIL (low grade squamous intraepithelial lesion) on Pap smear 04/2013    +HPV 33 or 45, 61       PAD (peripheral artery disease) (H)      PONV (postoperative nausea and vomiting)      Squamous cell lung cancer (H)      Thrombosis of leg 1967     Unspecified disorder of kidney and ureter     X-linked dominant Alport's syndrome.     Past Surgical History:   Procedure Laterality Date     BIOPSY      Kidney, Lung, Breast     BIOPSY ANAL N/A 03/14/2018    Procedure: BIOPSY ANAL;;  Surgeon: Shabbir Leo MD;  [facility]     BYPASS GRAFT FEMORAL FEMORAL Bilateral 04/25/2021    Procedure: Axplantation infected graft, femoral to femoral bypass with cadaveric artery, bilateral SATORIUS MUSCLE FLAP CREATION, evacuation of absece, vacuum closure;  Surgeon: Raven Lewis MD;  [facility]     COLONOSCOPY       COLONOSCOPY N/A 08/09/2017    Procedure: COMBINED COLONOSCOPY, SINGLE OR MULTIPLE BIOPSY/POLYPECTOMY BY BIOPSY;;  Surgeon: Sushil Hyatt MD;  [facility]     COLONOSCOPY N/A 7/28/2022    Procedure: COLONOSCOPY, WITH BIOPSY;  Surgeon: Moise Corcoran MD;  [facility]     COLPOSCOPY,LOOP ELECTRD CERVIX EXCIS  03/11/2008     TAL II     CONIZATION LEEP  07/17/2013    Procedure: CONIZATION LEEP;;  Surgeon: Liliana Renteria MD;  [facility]     CONIZATION LEEP N/A 08/17/2016    Procedure: CONIZATION LEEP;  Surgeon: Liliana Renteria MD;  [facility]     CV CORONARY ANGIOGRAM N/A 04/06/2021    Procedure: CV CORONARY ANGIOGRAM;  Surgeon: Sincere Rosas MD;  [facility]     CV CORONARY ANGIOGRAM N/A 04/25/2021    Procedure: Coronary Angiogram;  Surgeon: Sincere Rosas MD;  [facility]     CV PCI STENT DRUG ELUTING N/A 04/06/2021    Procedure: Percutaneous Coronary Intervention Stent Drug Eluting;  Surgeon: Sincere Rosas MD;  [facility]     ENDOVASCULAR REPAIR ANEURYSM AORTOILIAC Bilateral 04/06/2021    Procedure: Endovascular Abdominal Aortic Aneurysm Repair with Aortouniiliac Device and Femoral-Femoral Artery Bypass with [id number] Artegraft;  Surgeon: Abena Ferrara MD;  [facility]     EXAM UNDER ANESTHESIA ANUS  07/15/2014    Procedure: EXAM UNDER ANESTHESIA ANUS;  Surgeon: Radha Musa MD;  [facility]     EXAM UNDER ANESTHESIA ANUS N/A 03/14/2018    Procedure: EXAM UNDER ANESTHESIA ANUS;  Anal Exam Under Anesthesia With Excision of anal lesion, proctoscopy;  Surgeon: Shabbir Leo MD;  [facility]     EYE SURGERY       GENITOURINARY SURGERY       IR OR ANGIOGRAM  04/06/2021     IRRIGATION AND DEBRIDEMENT GROIN Right 04/24/2021    Procedure: IRRIGATION AND DEBRIDEMENT, INGUINAL REGION, I & D PF RIGHT GROIN;  Surgeon: Raven Lewis MD;  [facility]     IRRIGATION AND DEBRIDEMENT GROIN N/A 04/26/2021    Procedure: IRRIGATION AND DEBRIDEMENT, INGUINAL REGION, PLACEMENT OF VERAFLO WOUND VAC, WOUND WASHOUT,;  Surgeon: Raven Lewis MD;  [facility]     LASER CO2 EXCISE VULVA WIDE LOCAL  07/15/2014    Procedure: LASER CO2 EXCISE VULVA WIDE LOCAL;  Surgeon: Liliana Renteria  MD Nyasia;  [facility]     LASER CO2 VAGINA  07/17/2013    Procedure: LASER CO2 VAGINA;;  Surgeon: Liliana Renteria MD;  [facility]     LASER CO2 VAGINA N/A 09/25/2018    Procedure: LASER CO2 VAGINA;  Exam Under Anesthesia, CO2 Laser Ablation of Upper Vagina and Cervix;  Surgeon: Pati Garcia MD;  [facility]     MICROSCOPY ANAL  07/17/2013    Procedure: MICROSCOPY ANAL;  Anal Microscopy,  EUA vagina,Colposcopy Of Vagina And Vulva, Vaginal Biopsies, Omniguide Co2 Laser To Vagina and vulva, Loop Electrosurgical Excision Procedure To Cervix;  Surgeon: Radha Musa MD;  [facility]     MICROSCOPY ANAL  07/15/2014    Procedure: MICROSCOPY ANAL;  Surgeon: Radha Musa MD;  [facility]     PICC DOUBLE LUMEN PLACEMENT Right 04/30/2021    39cm, Basilic vein     TRANSESOPHAGEAL ECHOCARDIOGRAM INTRAOPERATIVE N/A 04/28/2021    Procedure: ECHOCARDIOGRAM, TRANSESOPHAGEAL, INTRAOPERATIVE;  Surgeon: GENERIC ANESTHESIA PROVIDER;  Location: [facility]     VASCULAR SURGERY      Thrombectomy     [facility] NONSPECIFIC PROCEDURE      Thrombectomy     [facility] NONSPECIFIC PROCEDURE  1955 and 1959    Bilater eye surgery - correction for crossed eyes     [facility] NONSPECIFIC PROCEDURE  1998    oopherectomy L     [facility] NONSPECIFIC PROCEDURE  1967    open kidney biopsy - L     [facility] TRANSPLANTATION OF KIDNEY  09/2004    recipient -- [facility]      Current Outpatient Medications   Medication Sig Dispense Refill     acetaminophen (TYLENOL) 325 MG tablet Take 2 tablets (650 mg) by mouth every 4 hours as needed for other (For optimal non-opioid multimodal pain management to improve pain control.) 100 tablet 3     aspirin (ASA) 81 MG chewable tablet Take 1 tablet (81 mg) by mouth daily (Patient taking differently: Take 81 mg by mouth every morning) 90 tablet 3     atorvastatin (LIPITOR) 80 MG tablet Take 1 tablet (80 mg) by mouth daily (Patient taking differently: Take 80 mg by mouth every morning) 90 tablet 3      bisacodyl (DULCOLAX) 5 MG EC tablet Take as directed. One day before exam take 2 tablets at 3 PM. Take 2 tablets at 11 PM. 4 tablet 0     Calcium Carb-Cholecalciferol (CALCIUM CARBONATE-VITAMIN D3) 600-400 MG-UNIT TABS TAKE ONE TABLET BY MOUTH TWICE A  tablet 3     calcium carbonate 600 mg-vitamin D 400 units (CALTRATE) 600-400 MG-UNIT per tablet Take 1 tablet by mouth 2 times daily 120 tablet 3     clopidogrel (PLAVIX) 75 MG tablet Take 1 tablet (75 mg) by mouth daily 90 tablet 3     fluticasone (FLONASE) 50 MCG/ACT nasal spray Spray 1 spray into both nostrils daily (Patient taking differently: Spray 1 spray into both nostrils every morning) 18.2 mL 3     hydrALAZINE (APRESOLINE) 10 MG tablet Take 1 tablet (10 mg) by mouth 3 times daily 270 tablet 3     isosorbide mononitrate (IMDUR) 60 MG 24 hr tablet Take 1 tablet (60 mg) by mouth daily 90 tablet 0     Lactobacillus-Inulin ([facility] DIGESTIVE [facility]) CAPS TAKE ONE CAPSULE BY MOUTH ONCE DAILY (DUE FOR PHYSICAL IN MARCH) (Patient taking differently: every morning) 90 capsule 3     lisinopril (ZESTRIL) 2.5 MG tablet Take 1 tablet (2.5 mg) by mouth daily (Patient taking differently: Take 2.5 mg by mouth every morning) 90 tablet 3     metoprolol tartrate (LOPRESSOR) 100 MG tablet Take 1 tablet (100 mg) by mouth 2 times daily 180 tablet 2     Sirolimus 1mg Take 2 tablets daily        Nutritional Supplements (ENSURE CLEAR) LIQD Take 1 Bottle by mouth daily 396 mL 11     pantoprazole (PROTONIX) 40 MG EC tablet Until she can be seen we can have her double the protonix from once a day to twice a day to see if helpful 60 tablet 0     polyethylene glycol (GOLYTELY) 236 g suspension Take as directed. One day before exam fill the jug with water. Cover and shake until well mixed. At 6 PM start drinking an 8oz glass of mixture every 15 minutes until jug is 1/2 empty. Store remainder in the refrigerator.  At 11 PM Start drinking the other half of the Golytely jug.  Drink one 8-ounce glass every 15 minutes until the jug is empty. 4000 mL 0     predniSONE (DELTASONE) 5 MG tablet Take 1 tablet (5 mg) by mouth daily (Patient taking differently: Take 5 mg by mouth every morning) 90 tablet 3     psyllium (METAMUCIL/KONSYL) 58.6 % powder Take by mouth every morning       torsemide (DEMADEX) 10 MG tablet Take 1 tablet (10 mg) by mouth daily Additional use as directed by [facility]. 90 tablet 1       Allergies   Allergen Reactions     Blood Transfusion Related (Informational Only) Other (See Comments)     Patient has a history of a clinically significant antibody against RBC antigens.  A delay in compatible RBCs may occur.     Ultracet Nausea and Vomiting and Hives     Hydrocodone Nausea and Vomiting and Hives        Social History     Tobacco Use     Smoking status: Light Smoker     Packs/day: 0.30     Years: 35.00     Pack years: 10.50     Types: Cigarettes     Start date: 1967     Last attempt to quit: 3/1/2021     Years since quittin.9     Smokeless tobacco: Never     Tobacco comments:     Couple times a week. 1-2 cigs 1-2x a week.   Substance Use Topics     Alcohol use: Not Currently     Comment: rarely       History   Drug Use Unknown         Objective     There were no vitals taken for this visit.    Physical Exam    GENERAL APPEARANCE: alert and frail     EYES: EOMI, PERRL     HENT: TM initially occluded by cerumen (removed with lighted curette by myself) and clear after -      NECK: no adenopathy, no asymmetry, masses, or scars and thyroid normal to palpation     RESP: lungs clear to auscultation - no rales, rhonchi or wheezes     CV: regular rates and rhythm, normal S1 S2, no S3 or S4 and no murmur, click or rub     ABDOMEN: soft and NT with kidney in the left lower abd     MS: extremities normal- no gross deformities noted, no evidence of inflammation in joints, FROM in all extremities.     SKIN: no suspicious lesions or rashes     NEURO: Normal strength and tone,  sensory exam grossly normal, mentation intact and speech normal     PSYCH: mentation appears normal. and affect normal/bright     LYMPHATICS: No cervical adenopathy    Recent Labs   Lab Test 08/04/22  1137 07/27/22  1544 06/22/22  1551 05/03/22  1459 03/01/22  1358 04/26/21  0157 04/25/21  2200 04/08/21  1624 04/08/21  0506   HGB  --   --   --  13.9 12.4   < >  --    < > 7.5*   PLT  --   --   --  298 315   < >  --    < > 105*   INR  --   --   --   --   --   --  1.18*  --   --     138   < > 137 138   < >  --    < > 141   POTASSIUM 3.5 3.9   < > 4.3 4.2   < >  --    < > 3.6   CR 1.63* 1.59*   < > 1.52* 1.56*   < >  --    < > 1.79*   A1C  --   --   --   --   --   --   --   --  5.6    < > = values in this interval not displayed.        Diagnostics:  Recent Results (from the past 24 hour(s))   CBC with platelets    Collection Time: 01/25/23  3:36 PM   Result Value Ref Range    WBC Count 7.1 4.0 - 11.0 10e3/uL    RBC Count 3.94 3.80 - 5.20 10e6/uL    Hemoglobin 11.2 (L) 11.7 - 15.7 g/dL    Hematocrit 35.5 35.0 - 47.0 %    MCV 90 78 - 100 fL    MCH 28.4 26.5 - 33.0 pg    MCHC 31.5 31.5 - 36.5 g/dL    RDW 14.4 10.0 - 15.0 %    Platelet Count 201 150 - 450 10e3/uL      EKG: sinus bradycardia, normal axis, normal intervals, no acute ST/T changes c/w ischemia, unchanged from previous tracings    Revised Cardiac Risk Index (RCRI):  The patient has the following serious cardiovascular risks for perioperative complications:   - Coronary Artery Disease (MI, positive stress test, angina, Qs on EKG) = 1 point     RCRI Interpretation: 1 point: Class II (low risk - 0.9% complication rate)           Signed Electronically by: Lisa J. Michelle, DO  Copy of this evaluation report is provided to requesting physician.
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
## 2023-01-25 NOTE — NURSING NOTE
Chief Complaint   Patient presents with     Follow Up     January 25, 2023: reason for visit: RTN CORE: 72 year old female presents with systolic heart failure, ef 55-60% for follow up with labs prior     Vitals were taken and medications reconciled.    Gabriel Carlos, EMT  3:54 PM
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
## 2023-01-26 ENCOUNTER — TELEPHONE (OUTPATIENT)
Dept: TRANSPLANT | Facility: CLINIC | Age: 73
End: 2023-01-26
Payer: COMMERCIAL

## 2023-01-26 ENCOUNTER — TELEPHONE (OUTPATIENT)
Dept: CARDIOLOGY | Facility: CLINIC | Age: 73
End: 2023-01-26
Payer: COMMERCIAL

## 2023-01-26 LAB
MYCOPHENOLATE SERPL LC/MS/MS-MCNC: <0.25 MG/L (ref 1–3.5)
MYCOPHENOLATE-G SERPL LC/MS/MS-MCNC: <6.5 MG/L (ref 30–95)
TME LAST DOSE: ABNORMAL H
TME LAST DOSE: ABNORMAL H

## 2023-01-26 NOTE — TELEPHONE ENCOUNTER
----- Message from Carolee Gar RN sent at 1/25/2023  4:51 PM CST -----  Regarding: follow up  Matilda  Marguerite saw Maribel today in [facility]. She will need follow up with [facility] in 1 week.  Thanks,  AVERY Zarco

## 2023-01-26 NOTE — TELEPHONE ENCOUNTER
Sirolimus = 10.9  (1/25/23) only a 6.5 hour level not 24 hour trough    PLAN:  Call Maribel Yang and check if she did not get her MPA prescription.    OUTCOME:  No answer. Left VM.  Sent [employer]ogou message as well.

## 2023-01-27 DIAGNOSIS — I50.9 CONGESTIVE HEART FAILURE, UNSPECIFIED HF CHRONICITY, UNSPECIFIED HEART FAILURE TYPE (H): Primary | ICD-10-CM

## 2023-02-01 RX ORDER — NALOXONE HYDROCHLORIDE 0.4 MG/ML
0.4 INJECTION, SOLUTION INTRAMUSCULAR; INTRAVENOUS; SUBCUTANEOUS
Status: CANCELLED | OUTPATIENT
Start: 2023-02-01

## 2023-02-01 RX ORDER — NALOXONE HYDROCHLORIDE 0.4 MG/ML
0.2 INJECTION, SOLUTION INTRAMUSCULAR; INTRAVENOUS; SUBCUTANEOUS
Status: CANCELLED | OUTPATIENT
Start: 2023-02-01

## 2023-02-01 RX ORDER — PROCHLORPERAZINE MALEATE 5 MG
5 TABLET ORAL EVERY 6 HOURS PRN
Status: CANCELLED | OUTPATIENT
Start: 2023-02-01

## 2023-02-01 RX ORDER — ONDANSETRON 4 MG/1
4 TABLET, ORALLY DISINTEGRATING ORAL EVERY 6 HOURS PRN
Status: CANCELLED | OUTPATIENT
Start: 2023-02-01

## 2023-02-01 RX ORDER — FLUMAZENIL 0.1 MG/ML
0.2 INJECTION, SOLUTION INTRAVENOUS
Status: CANCELLED | OUTPATIENT
Start: 2023-02-01 | End: 2023-02-02

## 2023-02-01 RX ORDER — ONDANSETRON 2 MG/ML
4 INJECTION INTRAMUSCULAR; INTRAVENOUS EVERY 6 HOURS PRN
Status: CANCELLED | OUTPATIENT
Start: 2023-02-01

## 2023-02-02 ENCOUNTER — ANESTHESIA EVENT (OUTPATIENT)
Dept: GASTROENTEROLOGY | Facility: CLINIC | Age: 73
End: 2023-02-02
Payer: COMMERCIAL

## 2023-02-02 ENCOUNTER — ANESTHESIA (OUTPATIENT)
Dept: GASTROENTEROLOGY | Facility: CLINIC | Age: 73
End: 2023-02-02
Payer: COMMERCIAL

## 2023-02-02 ENCOUNTER — HOSPITAL ENCOUNTER (OUTPATIENT)
Facility: CLINIC | Age: 73
Discharge: HOME | End: 2023-02-02
Attending: INTERNAL MEDICINE | Admitting: INTERNAL MEDICINE
Payer: COMMERCIAL

## 2023-02-02 VITALS
WEIGHT: 88 LBS | HEIGHT: 61 IN | BODY MASS INDEX: 16.62 KG/M2 | RESPIRATION RATE: 18 BRPM | SYSTOLIC BLOOD PRESSURE: 175 MMHG | OXYGEN SATURATION: 98 % | DIASTOLIC BLOOD PRESSURE: 101 MMHG | HEART RATE: 71 BPM

## 2023-02-02 LAB — UPPER GI ENDOSCOPY: NORMAL

## 2023-02-02 PROCEDURE — 250N000009 HC RX 250: Performed by: NURSE ANESTHETIST, CERTIFIED REGISTERED

## 2023-02-02 PROCEDURE — 88305 TISSUE EXAM BY PATHOLOGIST: CPT | Mod: TC | Performed by: INTERNAL MEDICINE

## 2023-02-02 PROCEDURE — 999N000010 HC STATISTIC ANES STAT CODE-CRNA PER MINUTE: Performed by: INTERNAL MEDICINE

## 2023-02-02 PROCEDURE — 43239 EGD BIOPSY SINGLE/MULTIPLE: CPT | Performed by: INTERNAL MEDICINE

## 2023-02-02 PROCEDURE — 370N000017 HC ANESTHESIA TECHNICAL FEE, PER MIN: Performed by: INTERNAL MEDICINE

## 2023-02-02 PROCEDURE — 258N000003 HC RX IP 258 OP 636: Performed by: NURSE ANESTHETIST, CERTIFIED REGISTERED

## 2023-02-02 PROCEDURE — 250N000011 HC RX IP 250 OP 636: Performed by: NURSE ANESTHETIST, CERTIFIED REGISTERED

## 2023-02-02 RX ORDER — ONDANSETRON 2 MG/ML
4 INJECTION INTRAMUSCULAR; INTRAVENOUS
Status: DISCONTINUED | OUTPATIENT
Start: 2023-02-02 | End: 2023-02-02 | Stop reason: HOSPADM

## 2023-02-02 RX ORDER — SIROLIMUS 2 MG/1
TABLET, FILM COATED ORAL DAILY
COMMUNITY
End: 2023-01-01

## 2023-02-02 RX ORDER — PROPOFOL 10 MG/ML
INJECTION, EMULSION INTRAVENOUS PRN
Status: DISCONTINUED | OUTPATIENT
Start: 2023-02-02 | End: 2023-02-02

## 2023-02-02 RX ORDER — LIDOCAINE HYDROCHLORIDE 20 MG/ML
INJECTION, SOLUTION INFILTRATION; PERINEURAL PRN
Status: DISCONTINUED | OUTPATIENT
Start: 2023-02-02 | End: 2023-02-02

## 2023-02-02 RX ORDER — PROPOFOL 10 MG/ML
INJECTION, EMULSION INTRAVENOUS CONTINUOUS PRN
Status: DISCONTINUED | OUTPATIENT
Start: 2023-02-02 | End: 2023-02-02

## 2023-02-02 RX ORDER — LIDOCAINE 40 MG/G
CREAM TOPICAL
Status: DISCONTINUED | OUTPATIENT
Start: 2023-02-02 | End: 2023-02-02 | Stop reason: HOSPADM

## 2023-02-02 RX ORDER — SODIUM CHLORIDE, SODIUM LACTATE, POTASSIUM CHLORIDE, CALCIUM CHLORIDE 600; 310; 30; 20 MG/100ML; MG/100ML; MG/100ML; MG/100ML
INJECTION, SOLUTION INTRAVENOUS CONTINUOUS PRN
Status: DISCONTINUED | OUTPATIENT
Start: 2023-02-02 | End: 2023-02-02

## 2023-02-02 RX ADMIN — SODIUM CHLORIDE, POTASSIUM CHLORIDE, SODIUM LACTATE AND CALCIUM CHLORIDE: 600; 310; 30; 20 INJECTION, SOLUTION INTRAVENOUS at 12:03

## 2023-02-02 RX ADMIN — PROPOFOL 20 MG: 10 INJECTION, EMULSION INTRAVENOUS at 12:03

## 2023-02-02 RX ADMIN — LIDOCAINE HYDROCHLORIDE 40 MG: 20 INJECTION, SOLUTION INFILTRATION; PERINEURAL at 12:03

## 2023-02-02 RX ADMIN — PROPOFOL 50 MCG/KG/MIN: 10 INJECTION, EMULSION INTRAVENOUS at 12:04

## 2023-02-02 RX ADMIN — TOPICAL ANESTHETIC 1 SPRAY: 200 SPRAY DENTAL; PERIODONTAL at 12:03

## 2023-02-02 RX ADMIN — PROPOFOL 20 MG: 10 INJECTION, EMULSION INTRAVENOUS at 12:07

## 2023-02-02 ASSESSMENT — ACTIVITIES OF DAILY LIVING (ADL): ADLS_ACUITY_SCORE: 33

## 2023-02-02 ASSESSMENT — COPD QUESTIONNAIRES: COPD: 1

## 2023-02-02 NOTE — PRE-PROCEDURE
I have seen and evaluated the patient today.  There are no significant changes in the patient's history or physical exam from the prior date of service.  The patient is stable and appropriate to undergo the proposed endoscopic procedure today.  Yazan Heredia MD

## 2023-02-02 NOTE — ANESTHESIA CARE TRANSFER NOTE
Patient: Maribel Yang    Procedure: Procedure(s):  ESOPHAGOGASTRODUODENOSCOPY, WITH BIOPSY       Diagnosis: Globus sensation [R09.89]  Gastroesophageal reflux disease with esophagitis, unspecified whether hemorrhage [K21.00]  Diagnosis Additional Information: No value filed.    Anesthesia Type:   MAC     Note:    Oropharynx: oropharynx clear of all foreign objects and spontaneously breathing  Level of Consciousness: awake  Oxygen Supplementation: room air    Independent Airway: airway patency satisfactory and stable  Dentition: dentition unchanged  Vital Signs Stable: post-procedure vital signs reviewed and stable  Report to RN Given: handoff report given  Patient transferred to: Phase II (endo phase II)    Handoff Report: Identifed the Patient, Identified the Reponsible Provider, Reviewed the pertinent medical history, Discussed the surgical course, Reviewed Intra-OP anesthesia mangement and issues during anesthesia, Set expectations for post-procedure period and Allowed opportunity for questions and acknowledgement of understanding      Vitals:  Vitals Value Taken Time   BP     Temp     Pulse 71 02/02/23 1221   Resp 25 02/02/23 1222   SpO2 100 % 02/02/23 1222   Vitals shown include unvalidated device data.    Electronically Signed By: SANDRA Tyler CRNA  February 2, 2023  12:24 PM

## 2023-02-02 NOTE — ANESTHESIA PREPROCEDURE EVALUATION
Anesthesia Pre-Procedure Evaluation    Patient: Maribel Yang   MRN: [id number] : 1950        Procedure : Procedure(s):  ESOPHAGOGASTRODUODENOSCOPY (EGD)          Past Medical History:   Diagnosis Date     Abnormal coagulation profile     p [id number]>A heterozygote      Age-related osteoporosis without current pathological fracture 2019     Anemia      Antiplatelet or antithrombotic long-term use      ASCUS with positive high risk HPV ,     + HPV 56, 54,& 6, colp - TAL III, Leep =TAL II     Basal cell carcinoma      Congestive heart failure, unspecified HF chronicity, unspecified heart failure type (H) 2021     COPD (chronic obstructive pulmonary disease) (H)      Depressive disorder 2015     History of blood transfusion      Hypertension      Immunosuppressed status (H)     due meds     Kidney replaced by transplant 2004    Living donor recipient,  Rejection 2005     LSIL (low grade squamous intraepithelial lesion) on Pap smear 2013    +HPV 33 or 45, 61       PAD (peripheral artery disease) (H)      PONV (postoperative nausea and vomiting)      Squamous cell lung cancer (H)      Thrombosis of leg 1967     Unspecified disorder of kidney and ureter     X-linked dominant Alport's syndrome.      Past Surgical History:   Procedure Laterality Date     BIOPSY      Kidney, Lung, Breast     BIOPSY ANAL N/A 2018    Procedure: BIOPSY ANAL;;  Surgeon: Shabbir Leo MD;  [facility]     BYPASS GRAFT FEMORAL FEMORAL Bilateral 2021    Procedure: Axplantation infected graft, femoral to femoral bypass with cadaveric artery, bilateral SATORIUS MUSCLE FLAP CREATION, evacuation of absece, vacuum closure;  Surgeon: Raven Lewis MD;  [facility]: [facility]     COLONOSCOPY       COLONOSCOPY N/A 2017    Procedure: COMBINED COLONOSCOPY, SINGLE OR MULTIPLE BIOPSY/POLYPECTOMY BY BIOPSY;;  Surgeon: Sushil Hyatt MD;  Location: [facility]     COLONOSCOPY N/A 2022    Procedure:  COLONOSCOPY, WITH BIOPSY;  Surgeon: Mosie Corcoran MD;  Location:  GI     COLPOSCOPY,LOOP ELECTRD CERVIX EXCIS  03/11/2008    TAL II     CONIZATION LEEP  07/17/2013    Procedure: CONIZATION LEEP;;  Surgeon: Liliana Renteria MD;  [facility]     CONIZATION LEEP N/A 08/17/2016    Procedure: CONIZATION LEEP;  Surgeon: Liliana Renteria MD;  [facility]     CV CORONARY ANGIOGRAM N/A 04/06/2021    Procedure: CV CORONARY ANGIOGRAM;  Surgeon: Sincere Rosas MD;  Location:  [facility]     CV CORONARY ANGIOGRAM N/A 04/25/2021    Procedure: Coronary Angiogram;  Surgeon: Sincere Rosas MD;  Location:  [facility]     CV PCI STENT DRUG ELUTING N/A 04/06/2021    Procedure: Percutaneous Coronary Intervention Stent Drug Eluting;  Surgeon: Sincere Rosas MD;  Location:  [facility]     ENDOVASCULAR REPAIR ANEURYSM AORTOILIAC Bilateral 04/06/2021    Procedure: Endovascular Abdominal Aortic Aneurysm Repair with Aortouniiliac Device and Femoral-Femoral Artery Bypass with [id number] Artegraft;  Surgeon: Abena Ferrara MD;  [facility]     EXAM UNDER ANESTHESIA ANUS  07/15/2014    Procedure: EXAM UNDER ANESTHESIA ANUS;  Surgeon: Radha Musa MD;  [facility]     EXAM UNDER ANESTHESIA ANUS N/A 03/14/2018    Procedure: EXAM UNDER ANESTHESIA ANUS;  Anal Exam Under Anesthesia With Excision of anal lesion, proctoscopy;  Surgeon: Shabbir Leo MD;  [facility]     EYE SURGERY       GENITOURINARY SURGERY       IR OR ANGIOGRAM  04/06/2021     IRRIGATION AND DEBRIDEMENT GROIN Right 04/24/2021    Procedure: IRRIGATION AND DEBRIDEMENT, INGUINAL REGION, I & D PF RIGHT GROIN;  Surgeon: Raven Lewis MD;  [facility]     IRRIGATION AND DEBRIDEMENT GROIN N/A 04/26/2021    Procedure: IRRIGATION AND DEBRIDEMENT, INGUINAL REGION, PLACEMENT OF VERAFLO WOUND VAC, WOUND WASHOUT,;  Surgeon: Raven Lewis MD;  [facility]     LASER CO2 EXCISE VULVA WIDE LOCAL  07/15/2014    Procedure: LASER CO2 EXCISE VULVA WIDE LOCAL;  Surgeon: Liliana Renteria MD;  [facility]     LASER CO2 VAGINA  07/17/2013    Procedure: LASER CO2 VAGINA;;  Surgeon: Liliana Renteria MD;  [facility]     LASER CO2 VAGINA N/A 09/25/2018    Procedure: LASER CO2 VAGINA;  Exam Under Anesthesia, CO2 Laser Ablation of Upper Vagina and Cervix;  Surgeon: Pati Garcia MD;  [facility]     MICROSCOPY ANAL  07/17/2013    Procedure: MICROSCOPY ANAL;  Anal Microscopy,  EUA vagina,Colposcopy Of Vagina And Vulva, Vaginal Biopsies, Omniguide Co2 Laser To Vagina and vulva, Loop Electrosurgical Excision Procedure To Cervix;  Surgeon: Radha Musa MD;  [facility]     MICROSCOPY ANAL  07/15/2014    Procedure: MICROSCOPY ANAL;  Surgeon: Radha Musa MD;  [facility]     PICC DOUBLE LUMEN PLACEMENT Right 04/30/2021    39cm, Basilic vein     TRANSESOPHAGEAL ECHOCARDIOGRAM INTRAOPERATIVE N/A 04/28/2021    Procedure: ECHOCARDIOGRAM, TRANSESOPHAGEAL, INTRAOPERATIVE;  Surgeon: GENERIC ANESTHESIA PROVIDER;  Location: [facility]     VASCULAR SURGERY      Thrombectomy     ZC NONSPECIFIC PROCEDURE      Thrombectomy     Z NONSPECIFIC PROCEDURE  1955 and 1959    Bilater eye surgery - correction for crossed eyes     Z NONSPECIFIC PROCEDURE  1998    oopherectomy L     [facility] NONSPECIFIC PROCEDURE  1967    open kidney biopsy - L     [facility] TRANSPLANTATION OF KIDNEY  09/2004    recipient -- done at [facility]      Allergies   Allergen Reactions     Blood Transfusion Related (Informational Only) Other (See Comments)     Patient has a history of a clinically significant antibody against RBC antigens.  A delay in compatible RBCs may occur.     Ultracet Nausea and Vomiting and Hives     Hydrocodone Nausea and Vomiting and Hives      Social History     Tobacco Use     Smoking status: Light Smoker     Packs/day: 0.30     Years: 35.00     Pack years:  10.50     Types: Cigarettes     Start date: 1967     Last attempt to quit: 3/1/2021     Years since quittin.9     Smokeless tobacco: Never     Tobacco comments:     Couple times a week. 1-2 cigs 1-2x a week.   Substance Use Topics     Alcohol use: Not Currently     Comment: rarely      Wt Readings from Last 1 Encounters:   23 42 kg (92 lb 8 oz)        Anesthesia Evaluation   Pt has had prior anesthetic.     No history of anesthetic complications       ROS/MED HX  ENT/Pulmonary:     (+) COPD,  (-) sleep apnea   Neurologic:    (-) no CVA   Cardiovascular:     (+) Dyslipidemia hypertension--CAD ---Taking blood thinners CHF     METS/Exercise Tolerance:     Hematologic:     (+) History of blood clots,     Musculoskeletal:       GI/Hepatic:    (-) GERD   Renal/Genitourinary:     (+) renal disease, type: CRI,     Endo:    (-) Type II DM   Psychiatric/Substance Use:       Infectious Disease:       Malignancy:       Other:            Physical Exam    Airway        Mallampati: II   TM distance: > 3 FB   Neck ROM: full   Mouth opening: > 3 cm    Respiratory Devices and Support         Dental           Cardiovascular   cardiovascular exam normal          Pulmonary   pulmonary exam normal                OUTSIDE LABS:  CBC:   Lab Results   Component Value Date    WBC 7.1 2023    WBC 9.2 2022    HGB 11.2 (L) 2023    HGB 13.9 2022    HCT 35.5 2023    HCT 43.4 2022     2023     2022     BMP:   Lab Results   Component Value Date     2023     2022    POTASSIUM 3.9 2023    POTASSIUM 3.5 2022    CHLORIDE 102 2023    CHLORIDE 104 2022    CO2 28 2023    CO2 29 2022    BUN 43.3 (H) 2023    BUN 36 (H) 2022    CR 1.95 (H) 2023    CR 1.63 (H) 2022     (H) 2023    GLC 93 2022     COAGS:   Lab Results   Component Value Date    PTT 26 2021    INR 1.18 (H)  04/25/2021     POC:   Lab Results   Component Value Date     (H) 04/26/2021    HCG Negative 09/22/2008     HEPATIC:   Lab Results   Component Value Date    ALBUMIN 3.2 (L) 03/01/2022    PROTTOTAL 7.6 03/01/2022    ALT 40 03/01/2022    AST 20 03/01/2022    GGT 76 (H) 06/23/2021    ALKPHOS 150 03/01/2022    BILITOTAL 0.5 03/01/2022    BILIDIRECT .0@ 03/10/2005     OTHER:   Lab Results   Component Value Date    PH 7.30 (L) 04/25/2021    LACT 1.3 06/13/2021    A1C 5.6 04/08/2021    KAYKAY 9.5 01/25/2023    PHOS 2.8 04/28/2021    MAG 2.0 06/24/2021    LIPASE 113 06/22/2021    TSH 1.97 04/08/2021    CRP 14.0 (H) 06/24/2021    SED 20 06/07/2021       Anesthesia Plan    ASA Status:  3   NPO Status:  NPO Appropriate    Anesthesia Type: MAC.     - Reason for MAC: straight local not clinically adequate              Consents    Anesthesia Plan(s) and associated risks, benefits, and realistic alternatives discussed. Questions answered and patient/representative(s) expressed understanding.    - Discussed:     - Discussed with:  Patient         Postoperative Care    Pain management: Multi-modal analgesia.   PONV prophylaxis: Ondansetron (or other 5HT-3), Background Propofol Infusion     Comments:                Gina Barber MD, MD

## 2023-02-02 NOTE — ANESTHESIA POSTPROCEDURE EVALUATION
Patient: Maribel Yang    Procedure: Procedure(s):  ESOPHAGOGASTRODUODENOSCOPY, WITH BIOPSY       Anesthesia Type:  MAC    Note:     Postop Pain Control: Uneventful            Sign Out: Well controlled pain   PONV: No   Neuro/Psych: Uneventful            Sign Out: Acceptable/Baseline neuro status   Airway/Respiratory: Uneventful            Sign Out: Acceptable/Baseline resp. status   CV/Hemodynamics: Uneventful            Sign Out: Acceptable CV status; No obvious hypovolemia; No obvious fluid overload   Other NRE:    DID A NON-ROUTINE EVENT OCCUR? No           Last vitals:  Vitals Value Taken Time   /88 02/02/23 1240   Temp     Pulse 56 02/02/23 1242   Resp 16 02/02/23 1242   SpO2 86 % 02/02/23 1242   Vitals shown include unvalidated device data.    Electronically Signed By: Gina Barber MD, MD  February 2, 2023  12:48 PM

## 2023-02-24 NOTE — TELEPHONE ENCOUNTER
Called Maribel and made aware message has been sent to Transplant Provider,  If symptoms get worse may Proceed to ER/ Urgent care.  Made aware there is a nurse on call if she needs to speak to a nurse after office hours.  Verbalized understanding and agreement to plan.  Appreciates call.

## 2023-02-24 NOTE — TELEPHONE ENCOUNTER
From:Maribel Yang       Sent:2/24/2023  9:31 AM CST         To:Lisseth NEWBY    Subject:Medication    Hello, I started the new medication on Sunday.  I believe I am having some side effects. Staying in bed today with headache, chills, stomach cramps.  Maribel    Message sent to Dr. See. Awaiting recommendations.

## 2023-02-27 NOTE — TELEPHONE ENCOUNTER
Reached out to Beatriz Larios to check on generic sirolimus copays.  Called back Maribel.  Reports she still having nausea.  Today is worse. States having headaches, chills and noticed geen stools.  Only had oatmeal last night. Denies any green leafy or intake of green peas and other green colored food.  Denies fever, just chills and feels cold.  Reports some SOB.  She is nauseated and can't eat or drink.  Symptoms started Wednesday, so it has been 6 days now.  Recommended she have somebody drive her to ED.  Verbalized understanding and agreement to plan.  Message sent to Dr. See.      ADDENDUM  Hitesh See MD Ututalum, Teresa, RN  Agree.  We don't have good immunosuppression options and I was hoping initial symptoms would improve.        ----- Message -----   From: Lisseth Heath, AVERY   Sent: 2/27/2023  12:00 PM CST   To: Hitesh See MD   Subject: update                                           She continues to have nausea. Unable to eat or drink.   Had some oatmeal last night.   Today also having some SOB with chills.   Noticed some green stool this morning.   No intake of green food.   Might be an infection?   I asked her to go to ED. She's been having the nausea and unable to eat and drink x 6 days now.

## 2023-02-27 NOTE — TELEPHONE ENCOUNTER
PA Initiation    Medication: Sirolimus - pa pending (tier exception)  Insurance Company: [employer] - Phone [phone] Fax [phone]  Pharmacy Filling the Rx: [facility] - [place], [place] - [facility] [address]  Filling Pharmacy Phone: [phone]  Filling Pharmacy Fax: [phone]  Start Date: 2/27/2023

## 2023-02-28 NOTE — PROGRESS NOTES
Maribel is a 72 year old who is being evaluated via a billable video visit.      How would you like to obtain your AVS? MyChart  If the video visit is dropped, the invitation should be resent by: Text to cell phone: [phone]  Will anyone else be joining your video visit? No          Assessment & Plan     Diarrhea, unspecified type  Nausea and vomiting, unspecified vomiting type  Generalized abdominal tenderness, rebound tenderness presence not specified  Unclear etiology - bacteria vs virus vs medicine side effects - antinausea medicine sent to pharmacy but patient needs to monitor symptoms closely - push fluids (gatorade) and needs to get more substance/protein into system as soon as possible (within 24 hours); patient advised if unable to tolerate much more than Gatorade by mouth after trying the prescription for antinausea medicine/over the next 24 hours, needs to go to ED.  Lab only appointment for blood work and stool cultures encouraged if patient able as well. Return to clinic with any worsening or changes in symptoms or go to ER Urgent care in off hours.  - TSH WITH FREE T4 REFLEX; Future  - Comprehensive metabolic panel; Future  - CBC with Platelets & Differential; Future  - Ova and Parasite Exam Routine; Future  - Enteric Bacteria and Virus Panel by SALO Stool; Future  - Cryptosporidium/Giardia Immunoassay; Future  - C. difficile Toxin B PCR with reflex to C. difficile Antigen and Toxins A/B EIA; Future  - ondansetron (ZOFRAN ODT) 4 MG ODT tab; Take 1 tablet (4 mg) by mouth every 8 hours as needed for nausea  - Enteric Bacteria and Virus Panel by SALO Stool; Future    Review of prior external note(s) from - previous routine PCP notes  30 minutes spent on the date of the encounter doing chart review, history and exam, documentation and further activities per the note       Nicotine/Tobacco Cessation:  She reports that she has been smoking cigarettes. She started smoking about 56 years ago. She has a 10.50  "pack-year smoking history. She has never used smokeless tobacco.  Nicotine/Tobacco Cessation Plan:   Information offered: Patient not interested at this time      There are no Patient Instructions on file for this visit.    No follow-ups on file.    MADHURI Ngo [facility] UPYakimaEVELIN López is a 72 year old presenting for the following health issues:  No chief complaint on file.      HPI     Triage note from today:  \"Patient has been sick for days.  Haven't been able to eat  Nausea, headache, diarrhea  Thinks she has the same bacterial infection she had before.  Only started out at 88 lbs, can barely walk around the house  Even threw up the pepto bismol last night.  Patient is doing okay with Gatorade, but not much else.  Able to cognate and mentate during phone call.  RN advised that she should be seen: patient does not have any way to come in for a visit today.  RN advised to start with a virtual visit and see if we are able to assist that way.\"    Patient hasn't been able to eat anything in two days.  She is tolerating Gatorade recently with some improvement but still fatigue/sleeping and dry heaving with fluctuations between diarrhea and constipation.  Patient is still peeing.  No fever, night sweats but chills noted.    Initially thought related to new immunosuppressant medicine, but feels more like an infection she had a few years ago that she was treated with an antibiotic for. Discussed medicine with kidney team and they were \"looking into medication side effect possibilities\".  Colonoscopy updated 7/22, EGD updated 2/2/23        Review of Systems   Constitutional, HEENT, cardiovascular, pulmonary, GI, , musculoskeletal, neuro, skin, endocrine and psych systems are negative, except as otherwise noted.      Objective           Vitals:  No vitals were obtained today due to virtual visit.    Physical Exam   GENERAL: Healthy, alert and no distress  EYES: Eyes grossly " normal to inspection.  No discharge or erythema, or obvious scleral/conjunctival abnormalities.  RESP: No audible wheeze, cough, or visible cyanosis.  No visible retractions or increased work of breathing.    SKIN: Visible skin clear. No significant rash, abnormal pigmentation or lesions.  NEURO: Cranial nerves grossly intact.  Mentation and speech appropriate for age.  PSYCH: Mentation appears normal, affect normal/bright, judgement and insight intact, normal speech and appearance well-groomed.            Video-Visit Details    Type of service:  Video Visit   Video Start Time: 4:15 PM  Video End Time:4:40 PM    Originating Location (pt. Location): Home    Distant Location (provider location):  On-site  Platform used for Video Visit: Mikaela

## 2023-02-28 NOTE — Clinical Note
Please call patient tomorrow (Wednesday, 2/29/23) to check on symptoms and any issues. Thanks so much!

## 2023-02-28 NOTE — TELEPHONE ENCOUNTER
Patient has been sick for days.  Haven't been able to eat  Nausea, headache, diarrhea  Thinks she has the same bacterial infection she had before.  Only started out at 88 lbs, can barely walk around the house  Even threw up the pepto bismol last night.  Patient is doing okay with Gatorade, but not much else.  Able to cognate and mentate during phone call.  RN advised that she should be seen: patient does not have any way to come in for a visit today.  RN advised to start with a virtual visit and see if we are able to assist that way.  Wilda ASHTON RN

## 2023-03-01 NOTE — TELEPHONE ENCOUNTER
[10:46 AM] Beatriz Larios A  Patient Maribel Hernandez 1950 her medication for sirolimus on a tier exception was approved qty of 30 for a 30 day supply which brought it down to $7      Dr See made aware switching back to Sirolimus. (message sent)      PLAN:  Start Sirolimus 2 mg daily. Goal 3-5  Overlap  MPA x 3 days.  Weekly labs x 4:     CBC    BMP    Sirolimus        OUTCOME:  Verbalized understanding and agreement to plan.  Will start Sirolimus today.    Weekly Lab Orders placed.    [facility]    SOT Visit referral placed        Hitesh See MD [place]um, Teresa, RN  Sounds good.  Thanks       ----- Message -----   From: Lisseth Heath RN   Sent: 3/1/2023  12:23 PM CST   To: Hitesh See MD   Subject: Sirolimus                                         Dr. See,     She has been approved for tier exception for Sirolimus and will be able to afford the $7 copay every month.   I don't think she went to ER. She had a virtual visit with PA yesterday.    Switching from MPA back to Sirolimus.     Let me know if you have any other recommendations.     ThanksTorey

## 2023-03-01 NOTE — TELEPHONE ENCOUNTER
Prior Authorization Approval    Authorization Effective Date:  01/01/2023  Authorization Expiration Date:  12/31/2023  Medication: Sirolimus - pa pending (tier exception)  Approved Dose/Quantity: UD  Reference #:     Insurance Company: [employer] - Phone [phone] Fax [phone]  Expected CoPay:   $7  CoPay Card Available:      Foundation Assistance Needed:    Which Pharmacy is filling the prescription (Not needed for infusion/clinic administered): [facility] - [place] [address] [address]  Pharmacy Notified:  no  Patient Notified:  no

## 2023-03-02 NOTE — RESULT ENCOUNTER NOTE
"Results discussed with patient and ADS provider and advised need for ED>  Patient agreed with plan and will go to [facility] today.    Chichi \"Juan Carlos\" MADHURI Mendez   "

## 2023-03-02 NOTE — DISCHARGE INSTRUCTIONS
Home.  Your labs show stable heart tests.  Your creatinine was slightly up due to dehydration  You received IV fluids in the ER.  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  Return if any concerns at all.    Results for orders placed or performed during the hospital encounter of 03/02/23   XR Chest 2 Views     Status: None    Narrative    EXAM: XR CHEST 2 VIEWS  3/2/2023 1:02 PM      HISTORY: sob    COMPARISON: Chest x-ray 6/21/2021, CT 3/1/2022    FINDINGS: PA and lateral radiographs of the chest. The trachea is  midline. The cardiac silhouette is not enlarged. The aorta is tortuous  and dilated, similar to prior. Coronary artery stent. Azygos lobe.  Chronic blunting of the right costophrenic angle. No pneumothorax. The  lungs are hyperinflated with flattening of the diaphragm. No focal  airspace opacity. Aortic stent in the upper abdomen.      Impression    IMPRESSION:   1. No focal airspace opacity.  2. The aorta is tortuous and dilated, similar to prior.  3. Emphysematous changes of the lungs.    I have personally reviewed the examination and initial interpretation  and I agree with the findings.    MONIK CRANE MD         SYSTEM ID:  [id number]   [place] [facility]     Status: None (In process)    Narrative    The following orders were created for panel order [place] [facility].  Procedure                               Abnormality         Status                     ---------                               -----------         ------                     Extra Blue Top Tube[[id number]]                              Final result               Extra Red Top Tube[[id number]]                               Final result               Extra Green Top (Lithium...[[id number]]                                                  Extra Purple Top Tube[[id number]]                                                         Please view results for these tests on the individual orders.   Basic metabolic panel     Status: Abnormal   Result Value Ref Range    Sodium 137 136 - 145 mmol/L    Potassium 3.9 3.4 - 5.3 mmol/L    Chloride 95 (L) 98 - 107 mmol/L    Carbon Dioxide (CO2) 27 22 - 29 mmol/L    Anion Gap 15 7 - 15 mmol/L    Urea Nitrogen 38.9 (H) 8.0 - 23.0 mg/dL    Creatinine 2.41 (H) 0.51 - 0.95 mg/dL    Calcium 9.8 8.8 - 10.2 mg/dL    Glucose 97 70 - 99 mg/dL    GFR Estimate 21 (L) >60 mL/min/1.73m2   Extra Blue Top Tube     Status: None   Result Value Ref Range    Hold Specimen JIC    Extra Red Top Tube     Status: None   Result Value Ref Range    Hold Specimen JIC    Partial thromboplastin time     Status: Normal   Result Value Ref Range    aPTT 25 22 - 38 Seconds   INR     Status: Normal   Result Value Ref Range    INR 1.03 0.85 - 1.15   Comprehensive metabolic panel     Status: Abnormal   Result Value Ref Range    Sodium 137 136 - 145 mmol/L    Potassium 3.9 3.4 - 5.3 mmol/L    Chloride 95 (L) 98 - 107 mmol/L    Carbon Dioxide (CO2) 27 22 - 29 mmol/L    Anion Gap 15 7 - 15 mmol/L    Urea Nitrogen 38.9 (H) 8.0 - 23.0 mg/dL    Creatinine 2.41 (H) 0.51 - 0.95 mg/dL    Calcium 9.8 8.8 - 10.2 mg/dL    Glucose 97 70 - 99 mg/dL    Alkaline Phosphatase 116 (H) 35 - 104 U/L    AST 23 10 - 35 U/L    ALT 15 10 - 35 U/L    Protein Total 6.8 6.4 - 8.3 g/dL    Albumin 4.1 3.5 - 5.2 g/dL    Bilirubin Total 0.3 <=1.2 mg/dL    GFR Estimate 21 (L) >60 mL/min/1.73m2   Lipase     Status: Normal   Result Value Ref Range    Lipase 25 13 - 60 U/L   Lactic acid whole blood     Status: Normal   Result Value Ref Range    Lactic Acid 1.8 0.7 - 2.0 mmol/L   Troponin T, High Sensitivity     Status: Abnormal   Result Value Ref Range    Troponin T, High Sensitivity 39 (H) <=14 ng/L   Nt probnp inpatient (BNP)     Status: Abnormal   Result Value  Ref Range    N terminal Pro BNP Inpatient 1,871 (H) 0 - 900 pg/mL   TSH     Status: Normal   Result Value Ref Range    TSH 1.57 0.30 - 4.20 uIU/mL   UA with Microscopic reflex to Culture     Status: Abnormal    Specimen: Urine, Clean Catch   Result Value Ref Range    Color Urine Light Yellow Colorless, Straw, Light Yellow, Yellow    Appearance Urine Clear Clear    Glucose Urine Negative Negative mg/dL    Bilirubin Urine Negative Negative    Ketones Urine Negative Negative mg/dL    Specific Gravity Urine 1.011 1.003 - 1.035    Blood Urine Negative Negative    pH Urine 5.5 5.0 - 7.0    Protein Albumin Urine 20 (A) Negative mg/dL    Urobilinogen Urine Normal Normal, 2.0 mg/dL    Nitrite Urine Negative Negative    Leukocyte Esterase Urine Negative Negative    Mucus Urine Present (A) None Seen /LPF    RBC Urine 2 <=2 /HPF    WBC Urine 1 <=5 /HPF    Hyaline Casts Urine 3 (H) <=2 /LPF    Narrative    Urine Culture not indicated   Symptomatic Influenza A/B, RSV, & SARS-CoV2 PCR (COVID-19) Nasopharyngeal     Status: Normal    Specimen: Nasopharyngeal; Swab   Result Value Ref Range    Influenza A PCR Negative Negative    Influenza B PCR Negative Negative    RSV PCR Negative Negative    SARS CoV2 PCR Negative Negative    Narrative    Testing was performed using the Xpert Xpress CoV2/Flu/RSV Assay on the Cepheid GeneXpert Instrument. This test should be ordered for the detection of SARS-CoV-2, influenza, and RSV viruses in individuals who meet clinical and/or epidemiological criteria. Test performance is unknown in asymptomatic patients. This test is for in vitro diagnostic use under the FDA EUA for laboratories certified under CLIA to perform high or moderate complexity testing. This test has not been FDA cleared or approved. A negative result does not rule out the presence of PCR inhibitors in the specimen or target RNA in concentration below the limit of detection for the assay. If only one viral target is positive but  coinfection with multiple targets is suspected, the sample should be re-tested with another FDA cleared, approved, or authorized test, if coinfection would change clinical management. This test was validated by the [facility] [employer]. These laboratories are certified under the Clinical Laboratory Improvement Amendments of 1988 (CLIA-88) as qualified to perform high complexity laboratory testing.   CBC with platelets and differential     Status: Abnormal   Result Value Ref Range    WBC Count 14.7 (H) 4.0 - 11.0 10e3/uL    RBC Count 4.21 3.80 - 5.20 10e6/uL    Hemoglobin 12.0 11.7 - 15.7 g/dL    Hematocrit 38.8 35.0 - 47.0 %    MCV 92 78 - 100 fL    MCH 28.5 26.5 - 33.0 pg    MCHC 30.9 (L) 31.5 - 36.5 g/dL    RDW 14.7 10.0 - 15.0 %    Platelet Count 295 150 - 450 10e3/uL    % Neutrophils 90 %    % Lymphocytes 2 %    % Monocytes 6 %    % Eosinophils 1 %    % Basophils 0 %    % Immature Granulocytes 1 %    NRBCs per 100 WBC 0 <1 /100    Absolute Neutrophils 13.2 (H) 1.6 - 8.3 10e3/uL    Absolute Lymphocytes 0.4 (L) 0.8 - 5.3 10e3/uL    Absolute Monocytes 0.9 0.0 - 1.3 10e3/uL    Absolute Eosinophils 0.1 0.0 - 0.7 10e3/uL    Absolute Basophils 0.1 0.0 - 0.2 10e3/uL    Absolute Immature Granulocytes 0.1 <=0.4 10e3/uL    Absolute NRBCs 0.0 10e3/uL   Troponin T, High Sensitivity     Status: Abnormal   Result Value Ref Range    Troponin T, High Sensitivity 33 (H) <=14 ng/L   EKG 12 lead     Status: None   Result Value Ref Range    Systolic Blood Pressure  mmHg    Diastolic Blood Pressure  mmHg    Ventricular Rate 67 BPM    Atrial Rate 67 BPM    WV Interval 166 ms    QRS Duration 72 ms     ms    QTc 433 ms    P Axis 22 degrees    R AXIS 89 degrees    T Axis 44 degrees    Interpretation ECG       Sinus rhythm  Anterior infarct , age undetermined  Abnormal ECG  Unconfirmed report - interpretation of this ECG is computer generated - see medical record for final interpretation  Confirmed by - EMERGENCY ROOM,  PHYSICIAN ([id number]),  THOMAS BEAUCHAMP ([id number]) on 3/2/2023 12:32:44 PM     CBC with platelets differential     Status: Abnormal    Narrative    The following orders were created for panel order CBC with platelets differential.  Procedure                               Abnormality         Status                     ---------                               -----------         ------                     CBC with platelets and d...[[id number]]  Abnormal            Final result                 Please view results for these tests on the individual orders.

## 2023-03-02 NOTE — PROGRESS NOTES
Deferred by [facility] provider at this time due to complex CHF history etc - most likely needs fluids over a longer period of time.  Discussed with patient and will go to ED today.

## 2023-03-02 NOTE — ED PROVIDER NOTES
[facility] ([facility])    3/02/23        History   HPI  Maribel Yang is a 72 year old female who with a past history of HTN, CAD, inferior STEMI s/p RCA stent (4/7/21), HF with (EF of 30-35% on Echo 6/23/21)), PAD, AAA with prior fem-fem bypass s/p EVAR (4/6/21), smoking, COPD, SCC of R lung s/p SBRT (2014), anal canal carcinoma s/p chemoradiation (2018), ESRD s/p LDKT (2004), chronic anemia, s/p iron transfusions, history of DVT and osteoporosis.    Patient presents to the ED for evaluation of nausea, chills, headaches, and diarrhea.  Patient noted kidney transplant has been doing fairly well they switched her from her previous injection medicine to now mycophenolate approximate week ago because of insurance issues etc.  Patient notes now her symptoms started after this.  She has had diarrhea without blood she had nausea vomiting weakness headache some fevers and chills and feels short of breath without cough hemoptysis no leg pain leg swelling no chest pain no true abdominal pain reported no recent or body aches now presents for evaluation she did take her nausea medicine which did help her nausea clinic prescribed did bring in a stool sample also.  She initially had diarrhea and then some constipation but was able to produce stool today.  Presents for evaluation generalized weakness without focal findings.       Past Medical History  Past Medical History:   Diagnosis Date     Abnormal coagulation profile     p [id number] heterozygote      Age-related osteoporosis without current pathological fracture 06/22/2019     Anemia      Antiplatelet or antithrombotic long-term use      ASCUS with positive high risk HPV 2007, 2015    + HPV 56, 54,& 6, colp - TAL III, Leep =TAL II     Basal cell carcinoma      Congestive heart failure, unspecified HF chronicity, unspecified heart failure type (H) 06/22/2021     COPD (chronic obstructive pulmonary disease) (H)      Depressive disorder 07/2015      Heart attack (H)      History of blood transfusion      Hypertension      Immunosuppressed status (H)     due meds     Kidney replaced by transplant 09/2004    Living donor recipient,  Rejection 7/2005     LSIL (low grade squamous intraepithelial lesion) on Pap smear 04/2013    +HPV 33 or 45, 61       PAD (peripheral artery disease) (H)      PONV (postoperative nausea and vomiting)      Squamous cell lung cancer (H)      Thrombosis of leg 1967     Unspecified disorder of kidney and ureter     X-linked dominant Alport's syndrome.     Past Surgical History:   Procedure Laterality Date     BIOPSY      Kidney, Lung, Breast     BIOPSY ANAL N/A 03/14/2018    Procedure: BIOPSY ANAL;;  Surgeon: Shabbir Leo MD;  [facility]     BYPASS GRAFT FEMORAL FEMORAL Bilateral 04/25/2021    Procedure: Axplantation infected graft, femoral to femoral bypass with cadaveric artery, bilateral SATORIUS MUSCLE FLAP CREATION, evacuation of absece, vacuum closure;  Surgeon: Raven Lewis MD;  [facility]     COLONOSCOPY       COLONOSCOPY N/A 08/09/2017    Procedure: COMBINED COLONOSCOPY, SINGLE OR MULTIPLE BIOPSY/POLYPECTOMY BY BIOPSY;;  Surgeon: Sushil Hyatt MD;  [facility] GI     COLONOSCOPY N/A 7/28/2022    Procedure: COLONOSCOPY, WITH BIOPSY;  Surgeon: Moise Corcoran MD;  [facility]     COLPOSCOPY,LOOP ELECTRD CERVIX EXCIS  03/11/2008    TAL II     CONIZATION LEEP  07/17/2013    Procedure: CONIZATION LEEP;;  Surgeon: Liliana Renteria MD;  [facility]     CONIZATION LEEP N/A 08/17/2016    Procedure: CONIZATION LEEP;  Surgeon: Liliana Renteria MD;  [facility]     CV CORONARY ANGIOGRAM N/A 04/06/2021    Procedure: CV CORONARY ANGIOGRAM;  Surgeon: Sincere Rosas MD;  [facility]     CV CORONARY ANGIOGRAM N/A 04/25/2021    Procedure: Coronary Angiogram;  Surgeon: Sincere Rosas MD;  Location:  HEART CARDIAC CATH LAB     CV PCI  STENT DRUG ELUTING N/A 04/06/2021    Procedure: Percutaneous Coronary Intervention Stent Drug Eluting;  Surgeon: Sincere Rosas MD;  Location:  HEART CARDIAC CATH LAB     ENDOVASCULAR REPAIR ANEURYSM AORTOILIAC Bilateral 04/06/2021    Procedure: Endovascular Abdominal Aortic Aneurysm Repair with Aortouniiliac Device and Femoral-Femoral Artery Bypass with 9ygY60cm Artegraft;  Surgeon: Abena Ferrara MD;  [facility]     ESOPHAGOSCOPY, GASTROSCOPY, DUODENOSCOPY (EGD), COMBINED N/A 2/2/2023    Procedure: ESOPHAGOGASTRODUODENOSCOPY, WITH BIOPSY;  Surgeon: Yazan Heredia MD;  Location:  GI     EXAM UNDER ANESTHESIA ANUS  07/15/2014    Procedure: EXAM UNDER ANESTHESIA ANUS;  Surgeon: Radha Musa MD;  [facility]     EXAM UNDER ANESTHESIA ANUS N/A 03/14/2018    Procedure: EXAM UNDER ANESTHESIA ANUS;  Anal Exam Under Anesthesia With Excision of anal lesion, proctoscopy;  Surgeon: Shabbir Leo MD;  [facility]     EYE SURGERY       GENITOURINARY SURGERY       IR OR ANGIOGRAM  04/06/2021     IRRIGATION AND DEBRIDEMENT GROIN Right 04/24/2021    Procedure: IRRIGATION AND DEBRIDEMENT, INGUINAL REGION, I & D PF RIGHT GROIN;  Surgeon: Raven Lewis MD;  [facility]     IRRIGATION AND DEBRIDEMENT GROIN N/A 04/26/2021    Procedure: IRRIGATION AND DEBRIDEMENT, INGUINAL REGION, PLACEMENT OF VERAFLO WOUND VAC, WOUND WASHOUT,;  Surgeon: Raven Lewis MD;  [facility]     LASER CO2 EXCISE VULVA WIDE LOCAL  07/15/2014    Procedure: LASER CO2 EXCISE VULVA WIDE LOCAL;  Surgeon: Liliana Renteria MD;  [facility]     LASER CO2 VAGINA  07/17/2013    Procedure: LASER CO2 VAGINA;;  Surgeon: Liliana Renteria MD;  [facility]     LASER CO2 VAGINA N/A 09/25/2018    Procedure: LASER CO2 VAGINA;  Exam Under Anesthesia, CO2 Laser Ablation of Upper Vagina and Cervix;  Surgeon: Pati Garcia MD;  [facility]     MICROSCOPY ANAL  07/17/2013    Procedure: MICROSCOPY  ANAL;  Anal Microscopy,  EUA vagina,Colposcopy Of Vagina And Vulva, Vaginal Biopsies, Omniguide Co2 Laser To Vagina and vulva, Loop Electrosurgical Excision Procedure To Cervix;  Surgeon: Radha Musa MD;  [facility]     MICROSCOPY ANAL  07/15/2014    Procedure: MICROSCOPY ANAL;  Surgeon: Radha Musa MD;  [facility]     PICC DOUBLE LUMEN PLACEMENT Right 04/30/2021    39cm, Basilic vein     TRANSESOPHAGEAL ECHOCARDIOGRAM INTRAOPERATIVE N/A 04/28/2021    Procedure: ECHOCARDIOGRAM, TRANSESOPHAGEAL, INTRAOPERATIVE;  Surgeon: GENERIC ANESTHESIA PROVIDER;  [facility]     VASCULAR SURGERY      Thrombectomy     Z NONSPECIFIC PROCEDURE      Thrombectomy     [facility] NONSPECIFIC PROCEDURE  1955 and 1959    Bilater eye surgery - correction for crossed eyes     [facility] NONSPECIFIC PROCEDURE  1998    oopherectomy L     [facility] NONSPECIFIC PROCEDURE  1967    open kidney biopsy - L     [facility] TRANSPLANTATION OF KIDNEY  09/2004    recipient -- done at [facility]     acetaminophen (TYLENOL) 325 MG tablet  aspirin (ASA) 81 MG chewable tablet  atorvastatin (LIPITOR) 80 MG tablet  bisacodyl (DULCOLAX) 5 MG EC tablet  Calcium Carb-Cholecalciferol (CALCIUM CARBONATE-VITAMIN D3) 600-400 MG-UNIT TABS  calcium carbonate 600 mg-vitamin D 400 units (CALTRATE) 600-400 MG-UNIT per tablet  clopidogrel (PLAVIX) 75 MG tablet  fluticasone (FLONASE) 50 MCG/ACT nasal spray  hydrALAZINE (APRESOLINE) 10 MG tablet  isosorbide mononitrate (IMDUR) 60 MG 24 hr tablet  Lactobacillus-Inulin ([facility] DIGESTIVE HEALTH) CAPS  lisinopril (ZESTRIL) 2.5 MG tablet  metoprolol tartrate (LOPRESSOR) 100 MG tablet  Nutritional Supplements (ENSURE CLEAR) LIQD  ondansetron (ZOFRAN ODT) 4 MG ODT tab  pantoprazole (PROTONIX) 40 MG EC tablet  polyethylene glycol (GOLYTELY) 236 g suspension  predniSONE (DELTASONE) 5 MG tablet  psyllium (METAMUCIL/KONSYL) 58.6 % powder  sirolimus (GENERIC EQUIVALENT) 2 MG tablet  sirolimus (GENERIC EQUIVALENT) 2  MG tablet  torsemide (DEMADEX) 10 MG tablet      Allergies   Allergen Reactions     Blood Transfusion Related (Informational Only) Other (See Comments)     Patient has a history of a clinically significant antibody against RBC antigens.  A delay in compatible RBCs may occur.     Ultracet Nausea and Vomiting and Hives     Hydrocodone Nausea and Vomiting and Hives     Family History  Family History   Problem Relation Age of Onset     Diabetes Father      Alcohol/Drug Father      Arthritis Father      Hypertension Father      Lipids Father         high cholesterol     Arthritis Mother      Diabetes Mother      Depression Mother      Heart Disease Mother      Neurologic Disorder Mother      Obesity Mother      Psychotic Disorder Mother      Thyroid Disease Mother      Hypertension Mother      Gynecology Sister         Precancerous cell removal from cervix at age 45     Depression Sister      Allergies Sister      Alcohol/Drug Sister      Neurologic Disorder Sister      Cerebrovascular Disease Paternal Grandmother      Diabetes Paternal Grandmother      Alcohol/Drug Son      Colon Polyps Sister      Breast Cancer Niece      Other Cancer Sister         Cervical     Obesity Sister      Depression Sister      Substance Abuse Son      Substance Abuse Sister              Depression Sister      Asthma Other      Colon Cancer No family hx of      Crohn's Disease No family hx of      Ulcerative Colitis No family hx of      Melanoma No family hx of      Skin Cancer No family hx of      Social History   Social History     Tobacco Use     Smoking status: Some Days     Packs/day: 0.30     Years: 35.00     Pack years: 10.50     Types: Cigarettes     Start date: 1967     Last attempt to quit: 3/1/2021     Years since quittin.0     Smokeless tobacco: Never     Tobacco comments:     States smokes once in a while   Vaping Use     Vaping Use: Never used   Substance Use Topics     Alcohol use: Not Currently     Comment:  rarely     Drug use: Not Currently     Types: Marijuana      Past medical history, past surgical history, medications, allergies, family history, and social history were reviewed with the patient. No additional pertinent items.      A complete review of systems was performed with pertinent positives and negatives noted in the HPI, and all other systems negative.    Physical Exam   BP: 110/72  Pulse: 75  Temp: 97.6  F (36.4  C)  Resp: 17  SpO2: 100 %  Physical Exam  Vitals and nursing note reviewed.   Constitutional:       General: She is in acute distress.      Appearance: She is well-developed. She is not toxic-appearing or diaphoretic.      Comments: Patient mildly lethargic but alert and orient x3 is not encephalopathic not confused combative and noticed that anything here with other family member.   HENT:      Head: Normocephalic and atraumatic.      Nose: Nose normal.      Mouth/Throat:      Mouth: Mucous membranes are dry.      Pharynx: Oropharynx is clear.   Eyes:      General: No scleral icterus.     Extraocular Movements: Extraocular movements intact.      Conjunctiva/sclera: Conjunctivae normal.      Pupils: Pupils are equal, round, and reactive to light.   Cardiovascular:      Rate and Rhythm: Normal rate and regular rhythm.   Pulmonary:      Effort: No respiratory distress.      Breath sounds: No stridor.   Abdominal:      General: There is no distension.      Palpations: Abdomen is soft.      Tenderness: There is no abdominal tenderness. There is no guarding.   Musculoskeletal:         General: No swelling or tenderness.      Cervical back: Normal range of motion and neck supple. No rigidity.      Comments: Negative Homans' sign   Skin:     General: Skin is warm and dry.      Capillary Refill: Capillary refill takes less than 2 seconds.      Coloration: Skin is not pale.      Findings: No erythema or rash.   Neurological:      General: No focal deficit present.      Mental Status: She is alert and  oriented to person, place, and time. Mental status is at baseline.   Psychiatric:      Comments: Mildly flat otherwise appropriate here in the ER.           ED Course, Procedures, & Data         Patient states she still urinating was evaluated here in the ER and IV was established we will give a liter normal saline and or ejection fraction range of 35% or so.  We did chest x-ray also will draw labs etc. sent off stool studies check urinalysis for infection etc. I do COVID influenza RSV will reassess also based on other chemistries and labs  Patient EKG done revealing no hyperacute changes.  Laboratory testing lactic acid 1.8.  COVID influenza RSV negative.  Urinalysis without significant signs of infection lipase 25.  BNP 1800 troponin x2 were 39 and 33 repeat.  Sodium 137 potassium 3.9 chloride 95 bicarb 27.  BUN is 38 creatinine 2.41 slightly elevated.  White count was 14.7.  Hemoglobin is 12 platelets 295 INR 1.03.    Patient brought stool and also sent off for C. difficile and enteric.    Patient ER did receive a liter of normal saline in the ER.    Chest x-ray revealed no acute findings or changes seen.    Patient reassessed here in the ER patient otherwise feeling better discussed with nephrology transplant staff reviewing records patient sirolimus is being restarted again as it had been discontinued because of some insurance issues but now has been reinstated and should start again which patient took her first dose today.  Her nausea is better otherwise she has her medications that are she is comfortable going home at this point with her troponin decreasing patient vitally stable given fluids here transplant service will follow-up on patient's creatinine etc.   and patient agree and would like to go home.  Nurses  Procedures       ED Course Selections:        EKG Interpretation:      Interpreted by Saúl Seaman MD  Time reviewed: 1155  Symptoms at time of EKG: nausea and weakness   Rhythm: normal  sinus   Rate: normal  Axis: normal  Ectopy: none  Conduction: normal  ST Segments/ T Waves: Nonspecific ST-T wave changes  Q Waves: none  Comparison to prior: No old EKG available    Clinical Impression: nsr with nonspecific st changes                     Results for orders placed or performed during the hospital encounter of 03/02/23   XR Chest 2 Views     Status: None    Narrative    EXAM: XR CHEST 2 VIEWS  3/2/2023 1:02 PM      HISTORY: sob    COMPARISON: Chest x-ray 6/21/2021, CT 3/1/2022    FINDINGS: PA and lateral radiographs of the chest. The trachea is  midline. The cardiac silhouette is not enlarged. The aorta is tortuous  and dilated, similar to prior. Coronary artery stent. Azygos lobe.  Chronic blunting of the right costophrenic angle. No pneumothorax. The  lungs are hyperinflated with flattening of the diaphragm. No focal  airspace opacity. Aortic stent in the upper abdomen.      Impression    IMPRESSION:   1. No focal airspace opacity.  2. The aorta is tortuous and dilated, similar to prior.  3. Emphysematous changes of the lungs.    I have personally reviewed the examination and initial interpretation  and I agree with the findings.    MONIK CRANE MD         SYSTEM ID:  [id number]   [place] Draw *Canceled*     Status: None ()    Narrative    The following orders were created for panel order [place] Draw.  Procedure                               Abnormality         Status                     ---------                               -----------         ------                       Please view results for these tests on the individual orders.   [facility] Draw     Status: None (In process)    Narrative    The following orders were created for panel order [facility] Draw.  Procedure                               Abnormality         Status                     ---------                               -----------         ------                     Extra Blue Top Tube[[id number]]                               Final result               Extra Red Top Tube[[id number]]                               Final result               Extra Green Top (Lithium...[[id number]]                                                 Extra Purple Top Tube[[id number]]                                                         Please view results for these tests on the individual orders.   Basic metabolic panel     Status: Abnormal   Result Value Ref Range    Sodium 137 136 - 145 mmol/L    Potassium 3.9 3.4 - 5.3 mmol/L    Chloride 95 (L) 98 - 107 mmol/L    Carbon Dioxide (CO2) 27 22 - 29 mmol/L    Anion Gap 15 7 - 15 mmol/L    Urea Nitrogen 38.9 (H) 8.0 - 23.0 mg/dL    Creatinine 2.41 (H) 0.51 - 0.95 mg/dL    Calcium 9.8 8.8 - 10.2 mg/dL    Glucose 97 70 - 99 mg/dL    GFR Estimate 21 (L) >60 mL/min/1.73m2   Extra Blue Top Tube     Status: None   Result Value Ref Range    Hold Specimen JIC    Extra Red Top Tube     Status: None   Result Value Ref Range    Hold Specimen JIC    Partial thromboplastin time     Status: Normal   Result Value Ref Range    aPTT 25 22 - 38 Seconds   INR     Status: Normal   Result Value Ref Range    INR 1.03 0.85 - 1.15   Comprehensive metabolic panel     Status: Abnormal   Result Value Ref Range    Sodium 137 136 - 145 mmol/L    Potassium 3.9 3.4 - 5.3 mmol/L    Chloride 95 (L) 98 - 107 mmol/L    Carbon Dioxide (CO2) 27 22 - 29 mmol/L    Anion Gap 15 7 - 15 mmol/L    Urea Nitrogen 38.9 (H) 8.0 - 23.0 mg/dL    Creatinine 2.41 (H) 0.51 - 0.95 mg/dL    Calcium 9.8 8.8 - 10.2 mg/dL    Glucose 97 70 - 99 mg/dL    Alkaline Phosphatase 116 (H) 35 - 104 U/L    AST 23 10 - 35 U/L    ALT 15 10 - 35 U/L    Protein Total 6.8 6.4 - 8.3 g/dL    Albumin 4.1 3.5 - 5.2 g/dL    Bilirubin Total 0.3 <=1.2 mg/dL    GFR Estimate 21 (L) >60 mL/min/1.73m2   Lipase     Status: Normal   Result Value Ref Range    Lipase 25 13 - 60 U/L   Lactic acid whole blood     Status: Normal   Result Value Ref Range    Lactic Acid 1.8 0.7 - 2.0 mmol/L   Troponin T,  High Sensitivity     Status: Abnormal   Result Value Ref Range    Troponin T, High Sensitivity 39 (H) <=14 ng/L   Nt probnp inpatient (BNP)     Status: Abnormal   Result Value Ref Range    N terminal Pro BNP Inpatient 1,871 (H) 0 - 900 pg/mL   TSH     Status: Normal   Result Value Ref Range    TSH 1.57 0.30 - 4.20 uIU/mL   UA with Microscopic reflex to Culture     Status: Abnormal    Specimen: Urine, Clean Catch   Result Value Ref Range    Color Urine Light Yellow Colorless, Straw, Light Yellow, Yellow    Appearance Urine Clear Clear    Glucose Urine Negative Negative mg/dL    Bilirubin Urine Negative Negative    Ketones Urine Negative Negative mg/dL    Specific Gravity Urine 1.011 1.003 - 1.035    Blood Urine Negative Negative    pH Urine 5.5 5.0 - 7.0    Protein Albumin Urine 20 (A) Negative mg/dL    Urobilinogen Urine Normal Normal, 2.0 mg/dL    Nitrite Urine Negative Negative    Leukocyte Esterase Urine Negative Negative    Mucus Urine Present (A) None Seen /LPF    RBC Urine 2 <=2 /HPF    WBC Urine 1 <=5 /HPF    Hyaline Casts Urine 3 (H) <=2 /LPF    Narrative    Urine Culture not indicated   Symptomatic Influenza A/B, RSV, & SARS-CoV2 PCR (COVID-19) Nasopharyngeal     Status: Normal    Specimen: Nasopharyngeal; Swab   Result Value Ref Range    Influenza A PCR Negative Negative    Influenza B PCR Negative Negative    RSV PCR Negative Negative    SARS CoV2 PCR Negative Negative    Narrative    Testing was performed using the Xpert Xpress CoV2/Flu/RSV Assay on the [employer] GeneXpert Instrument. This test should be ordered for the detection of SARS-CoV-2, influenza, and RSV viruses in individuals who meet clinical and/or epidemiological criteria. Test performance is unknown in asymptomatic patients. This test is for in vitro diagnostic use under the FDA EUA for laboratories certified under CLIA to perform high or moderate complexity testing. This test has not been FDA cleared or approved. A negative result does not  rule out the presence of PCR inhibitors in the specimen or target RNA in concentration below the limit of detection for the assay. If only one viral target is positive but coinfection with multiple targets is suspected, the sample should be re-tested with another FDA cleared, approved, or authorized test, if coinfection would change clinical management. This test was validated by the [facility] [employer]. These laboratories are certified under the Clinical Laboratory Improvement Amendments of 1988 (CLIA-88) as qualified to perform high complexity laboratory testing.   CBC with platelets and differential     Status: Abnormal   Result Value Ref Range    WBC Count 14.7 (H) 4.0 - 11.0 10e3/uL    RBC Count 4.21 3.80 - 5.20 10e6/uL    Hemoglobin 12.0 11.7 - 15.7 g/dL    Hematocrit 38.8 35.0 - 47.0 %    MCV 92 78 - 100 fL    MCH 28.5 26.5 - 33.0 pg    MCHC 30.9 (L) 31.5 - 36.5 g/dL    RDW 14.7 10.0 - 15.0 %    Platelet Count 295 150 - 450 10e3/uL    % Neutrophils 90 %    % Lymphocytes 2 %    % Monocytes 6 %    % Eosinophils 1 %    % Basophils 0 %    % Immature Granulocytes 1 %    NRBCs per 100 WBC 0 <1 /100    Absolute Neutrophils 13.2 (H) 1.6 - 8.3 10e3/uL    Absolute Lymphocytes 0.4 (L) 0.8 - 5.3 10e3/uL    Absolute Monocytes 0.9 0.0 - 1.3 10e3/uL    Absolute Eosinophils 0.1 0.0 - 0.7 10e3/uL    Absolute Basophils 0.1 0.0 - 0.2 10e3/uL    Absolute Immature Granulocytes 0.1 <=0.4 10e3/uL    Absolute NRBCs 0.0 10e3/uL   Troponin T, High Sensitivity     Status: Abnormal   Result Value Ref Range    Troponin T, High Sensitivity 33 (H) <=14 ng/L   EKG 12 lead     Status: None   Result Value Ref Range    Systolic Blood Pressure  mmHg    Diastolic Blood Pressure  mmHg    Ventricular Rate 67 BPM    Atrial Rate 67 BPM    NM Interval 166 ms    QRS Duration 72 ms     ms    QTc 433 ms    P Axis 22 degrees    R AXIS 89 degrees    T Axis 44 degrees    Interpretation ECG       Sinus rhythm  Anterior infarct , age  undetermined  Abnormal ECG  Unconfirmed report - interpretation of this ECG is computer generated - see medical record for final interpretation  Confirmed by - EMERGENCY ROOM, PHYSICIAN ([id number]),  THOMAS BEAUCHAMP ([id number]) on 3/2/2023 12:32:44 PM     CBC with platelets differential     Status: Abnormal    Narrative    The following orders were created for panel order CBC with platelets differential.  Procedure                               Abnormality         Status                     ---------                               -----------         ------                     CBC with platelets and d...[[id number]]  Abnormal            Final result                 Please view results for these tests on the individual orders.     Medications   0.9% sodium chloride BOLUS (0 mLs Intravenous Stopped 3/2/23 1449)     Labs Ordered and Resulted from Time of ED Arrival to Time of ED Departure   BASIC METABOLIC PANEL - Abnormal       Result Value    Sodium 137      Potassium 3.9      Chloride 95 (*)     Carbon Dioxide (CO2) 27      Anion Gap 15      Urea Nitrogen 38.9 (*)     Creatinine 2.41 (*)     Calcium 9.8      Glucose 97      GFR Estimate 21 (*)    COMPREHENSIVE METABOLIC PANEL - Abnormal    Sodium 137      Potassium 3.9      Chloride 95 (*)     Carbon Dioxide (CO2) 27      Anion Gap 15      Urea Nitrogen 38.9 (*)     Creatinine 2.41 (*)     Calcium 9.8      Glucose 97      Alkaline Phosphatase 116 (*)     AST 23      ALT 15      Protein Total 6.8      Albumin 4.1      Bilirubin Total 0.3      GFR Estimate 21 (*)    TROPONIN T, HIGH SENSITIVITY - Abnormal    Troponin T, High Sensitivity 39 (*)    NT PROBNP INPATIENT - Abnormal    N terminal Pro BNP Inpatient 1,871 (*)    ROUTINE UA WITH MICROSCOPIC REFLEX TO CULTURE - Abnormal    Color Urine Light Yellow      Appearance Urine Clear      Glucose Urine Negative      Bilirubin Urine Negative      Ketones Urine Negative      Specific Gravity Urine 1.011      Blood Urine  Negative      pH Urine 5.5      Protein Albumin Urine 20 (*)     Urobilinogen Urine Normal      Nitrite Urine Negative      Leukocyte Esterase Urine Negative      Mucus Urine Present (*)     RBC Urine 2      WBC Urine 1      Hyaline Casts Urine 3 (*)    CBC WITH PLATELETS AND DIFFERENTIAL - Abnormal    WBC Count 14.7 (*)     RBC Count 4.21      Hemoglobin 12.0      Hematocrit 38.8      MCV 92      MCH 28.5      MCHC 30.9 (*)     RDW 14.7      Platelet Count 295      % Neutrophils 90      % Lymphocytes 2      % Monocytes 6      % Eosinophils 1      % Basophils 0      % Immature Granulocytes 1      NRBCs per 100 WBC 0      Absolute Neutrophils 13.2 (*)     Absolute Lymphocytes 0.4 (*)     Absolute Monocytes 0.9      Absolute Eosinophils 0.1      Absolute Basophils 0.1      Absolute Immature Granulocytes 0.1      Absolute NRBCs 0.0     TROPONIN T, HIGH SENSITIVITY - Abnormal    Troponin T, High Sensitivity 33 (*)    PARTIAL THROMBOPLASTIN TIME - Normal    aPTT 25     INR - Normal    INR 1.03     LIPASE - Normal    Lipase 25     LACTIC ACID WHOLE BLOOD - Normal    Lactic Acid 1.8     TSH - Normal    TSH 1.57     INFLUENZA A/B, RSV, & SARS-COV2 PCR - Normal    Influenza A PCR Negative      Influenza B PCR Negative      RSV PCR Negative      SARS CoV2 PCR Negative     ENTERIC BACTERIA AND VIRUS PANEL BY SALO STOOL   C. DIFFICILE TOXIN B PCR WITH REFLEX TO C. DIFFICILE ANTIGEN AND TOXINS A/B EIA     XR Chest 2 Views   Final Result   IMPRESSION:    1. No focal airspace opacity.   2. The aorta is tortuous and dilated, similar to prior.   3. Emphysematous changes of the lungs.      I have personally reviewed the examination and initial interpretation   and I agree with the findings.      MONIK CRANE MD            SYSTEM ID:  [id number]             Critical care was not performed.     Medical Decision Making  The patient's presentation was of high complexity (a chronic illness severe exacerbation, progression, or side  effect of treatment).    The patient's evaluation involved:  review of external note(s) from 2 sources (see separate area of note for details)  ordering and/or review of 3+ test(s) in this encounter (see separate area of note for details)  review of 2 test result(s) ordered prior to this encounter (see separate area of note for details)  discussion of management or test interpretation with another health professional (see separate area of note for details)    The patient's management necessitated high risk (a decision regarding hospitalization).      Assessment & Plan   72-year-old female history of kidney transplant several years ago presented the ER with 1+ week of headache nausea vomiting diarrhea after the switched from sirolimus that she been on for years to mycophenolate.  Patient presented the ER for evaluation she had received some nausea medicine from clinic as that is better does not have profuse diarrhea but did bring in stool samples.  These were sent off.  Her abdomen is soft otherwise patient is somewhat dehydrated given a liter of normal saline patient's previous echo in the past noted to be 30 to 35% EF.  Patient's creatinine slightly elevated 2.41 given fluids here in the ER patient feeling better discussed with transplant service staff also agrees patient to follow-up as an outpatient patient is reinstating her sirolimus again.  Patient's nausea vomiting is better able take orally here is comfortable going home.  2 troponins did not show any increase but a decrease from 39-33.  EKG without any new hyperacute changes at this point patient discharged home with rehydration with some mild AMY and transplant history to be followed as noted.       I have reviewed the nursing notes. I have reviewed the findings, diagnosis, plan and need for follow up with the patient.    Discharge Medication List as of 3/2/2023  4:06 PM          Final diagnoses:   Status post kidney transplant   Nausea vomiting and  diarrhea   Dehydration   Acute kidney injury (H)   Medication intolerance         [facility] EMERGENCY DEPARTMENT  3/2/2023    This note was created at least in part by the use of dragon voice dictation system. Inadvertent typographical errors may still exist.  Saúl Seaman MD.    Patient evaluated in the emergency department during the COVID-19 pandemic period. Careful attention to patients safety was addressed throughout the evaluation. Evaluation and treatment management was initiated with disposition made efficiently and appropriate as possible to minimize any risk of potential exposure to patient during this evaluation.       Saúl Seaman MD  03/02/23 [id number]

## 2023-03-02 NOTE — ED TRIAGE NOTES
Patient presents to triage for evaluation of nausea, chills, headaches, cramping, diarrhea.   Patient recently had change to her immunosuppressant and has been having symptoms since starting new medication on Sunday. Received prescription for nausea medication from clinic which has helped; now able to eat and drink. Patient also reports SOB but denies chest pain. Denies hematemesis. Kidney transplant in 2004. Denies urinary symptoms.  /72   Pulse 75   Temp 97.6  F (36.4  C) (Temporal)   Resp 17   SpO2 100%

## 2023-03-06 NOTE — Clinical Note
LCA Cine(s)  injected and visualized utilizing power injector system. Pt/ot continue current management

## 2023-03-09 NOTE — TELEPHONE ENCOUNTER
Sirolimus level = 8.3  (3/8/23)  Goal 3-5  Current Sirolimus dose 2 mg dialy    4.5 hour level    PLAN:  Confirm dose and inacurate level.  Requested recheck labs and make sure it is a good 24 hour level.    OUTCOME:  Left VM.

## 2023-03-15 NOTE — PATIENT INSTRUCTIONS
Take your medicines every day, as directed    Changes made today:  No changes today.  We will call you with your labs results and plan of care changes if needed.     Monitor Your Weight and Symptoms    Contact us if you:    Gain 2 pounds in one day or 5 pounds in one week  Feel more short of breath  Notice more leg swelling  Feel lightheadeded   Change your lifestyle    Limit Salt or Sodium:  2000 mg  Limit Fluids:  2000 mL or approximately 64 ounces  Eat a Heart Healthy Diet  Low in saturated fats  Stay Active:  Aim to move at least 150 minutes every  week         To Contact us    During Business Hours:  [phone], option # 1      After hours, weekends or holidays:   [phone], Option #4  Ask to speak to the On-Call Cardiologist. Inform them you are a CORE/heart failure patient at the [place].     Use [employer] allows you to communicate directly with your heart team through secure messaging.  [employer] can be accessed any time on your phone, computer, or tablet.  If you need assistance, we'd be happy to help!         Keep your Heart Appointments:         Please consider attending our virtual support group which is held monthly. Please reach out to Ciro at [phone] for more information if you are interested in attending.       2023 dates:    Monday, April 3rd, 1-2pm (Topic: Remote monitoring)  Monday, May 1st, 1-2pm (Topic: CORE clinic)  Monday, June 5th, 1-2pm  Monday, July 3rd, 1-2pm  Monday, August 7th, 1-2pm  Monday, September 11th, 1-2pm  Monday, October 2nd, 1-2pm  Monday, November 6th, 1-2pm  Monday, December 4th, 1-2pm
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
## 2023-03-15 NOTE — NURSING NOTE
Chief Complaint   Patient presents with     Follow Up     RTN CORE: 72 year old female presents with systolic heart failure, ef 55-60% for follow up with labs prior     Vitals were taken and medications reconciled.    Yazan Ruiz, EMT  5:46 PM

## 2023-03-17 NOTE — TELEPHONE ENCOUNTER
Creatinine = 1.91 (3/15/23)  Baseline 1.3-1.6    Sirolimus = 7.6  (3/15/23)  Goal 3-5  Current Sirolimus dose 2 mg daily    Last dose 9 AM  Labs drawn 5:31 PM  --- 8.5 hr trough    PLAN:   Verify current dose.   Confirm no new medications or illness (shaheed. Diarrhea).  Recommendations: stay on the same dose.   Recheck level next week and make sure it is a good trough to avoid additional lab draws.  Still on weekly labs (med switch) -- cbc, bmp, sirolimus      OUTCOME:  Left detailed VM.
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
## 2023-03-22 NOTE — NURSING NOTE
Per Marguerite, schedule 6 month CORE return visit. Message sent to schedulers and orders placed.

## 2023-03-23 NOTE — TELEPHONE ENCOUNTER
----- Message from Cynthia Blackwood RN sent at 3/22/2023  5:05 PM CDT -----  Hi - patient needs a 6 month CORE follow up with Marguerite, can you call to schedule? Thanks! Mayra     ----- Message -----  From: Marguerite Muñoz APRN CNP  Sent: 3/20/2023   7:24 PM CDT  To: Cynthia Blackwood RN    Please schedule patient for follow up in 6 months.

## 2023-03-27 NOTE — TELEPHONE ENCOUNTER
ISSUE:  Urgent findings on CT, managed by ordering provider.   No visible documentation of action taken.    PLAN:  Page provider.    OUTCOME:  Provider paged x2. Result note sent in accordance with action taken by imaging department on date of result (3/24).

## 2023-03-31 NOTE — TELEPHONE ENCOUNTER
I called Maribel to discuss her CT scan from last week since she cancelled her appointment with me today. She is having car trouble.     She has an enlarging spiculated nodule close to prior SBRT site which was done for her lung cancer in 2014. I discussed these findings with our thoracic oncology group and this is unlikely to be a recurrence or fibrosis from XRT since she is almost 10 years out. She will be discussed at [facility] this coming week and we may move forward with a PET scan.     She also has enlargement of her thoracic aortic aneurysm. She has not had any recent CP. I will send her back to Dr. Ferrara for further assessment of this. I asked her to go to ED with any CP.    Will plan to see her later next week and also draw MGUS labs at that time.     Phone call duration: 7 minutes     -Angelica Ribeiro PA-C

## 2023-04-03 NOTE — TELEPHONE ENCOUNTER
LCV:3/15/2023  [facility]  isosorbide mononitrate (IMDUR) 60 MG 24 hr tablet

## 2023-04-03 NOTE — TELEPHONE ENCOUNTER
torsemide (DEMADEX) 10 MG tablet      Last Written Prescription Date:  10-3-22  Last Fill Quantity: 90,   # refills: 1  Last Office Visit : 3-15-23  Future Office visit:  9-6-23    Creatinine   Date Value Ref Range Status   03/24/2023 1.79 (H) 0.51 - 0.95 mg/dL Final   07/09/2021 1.49 (H) 0.52 - 1.04 mg/dL Final       Routing refill request to provider for review/approval because:  Abn lab: Cr

## 2023-04-04 NOTE — TELEPHONE ENCOUNTER
Seen in [facility] 3/15/23. No changes made at that time. Continue current regimen, follow up in 6 months. Refill approved.

## 2023-04-05 NOTE — PROGRESS NOTES
[facility]    Hematology/Oncology Established Patient Note  Apr 6, 2023      Reason for Follow-up: anal canal carcinoma      HISTORY OF PRESENT ILLNESS: Maribel Yang is a 72 year old female with PMHx of kidney transplant in 2004, lung cancer in 2014 (SCC of the RUL, stage [id number] (IA) s/p SBRT by Dr. Mckeon), HPV, PAD, who presents with anal canal carcinoma.   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 Renteria.  She has undergone high resolution anoscopy with biopsy, which showed superficially invasive squamous cell carcinoma.  On 3/14/18, she underwent exam under anesthesia with full thickness excision of superficially invasive anal cancer by Dr. Leo.  Pathology showed poorly differentiated invasive squamous cell carcinoma, tumor size 7 mm, tumor invades into anal sphincter muscle, resection margins negative for carcinoma and high-grade dysplasia.  Invasive tumor is 1 mm from closest margin.  There is background high grade squamous intraepithelial lesion (AIN 3).  It was staged pT1.  She has MRI pelvis on 2/28/18 that showed no pelvic or inguinal lymphadenopathy.    Patient was discussed at tumor conference, and consensus was that no further surgery was feasible, and recommendation is for chemoradiation, but with Xeloda without mitomycin, considering her co-morbidities and history of renal transplant on immunosuppression.    She started chemoradiation on 4/30/18 and completed it on 6/11/18. She presents in routine surveillance.       INTERIM HISTORY: Maribel is overall feeling well. Clinical condition is stable. She is able to move around her house well. Does stairs 4-5x per day without difficulty. Can wash the floor and do other chores. Does not walk much outside the home. Breathing has been stable overall. No significant cough. No fevers or recent infections. She had episodes of n/v/d after reacting to a  transplant medication last month which required an ED visit.     No CP. No abdominal pain. Bowels at baseline. Appetite is low but stable. Weight has been stable but she would like to gain more. She is drinking 1 Ensure per day. Could drink more but limited by cost.       REVIEW OF SYSTEMS:   14 point ROS was reviewed and is negative other than as noted above in HPI.       HOME MEDICATIONS:  Current Outpatient Medications   Medication Sig Dispense Refill     acetaminophen (TYLENOL) 325 MG tablet Take 2 tablets (650 mg) by mouth every 4 hours as needed for other (For optimal non-opioid multimodal pain management to improve pain control.) 100 tablet 3     aspirin (ASA) 81 MG chewable tablet Take 1 tablet (81 mg) by mouth daily (Patient taking differently: Take 81 mg by mouth every morning) 90 tablet 3     atorvastatin (LIPITOR) 80 MG tablet Take 1 tablet (80 mg) by mouth daily (Patient taking differently: Take 80 mg by mouth every morning) 90 tablet 3     bisacodyl (DULCOLAX) 5 MG EC tablet Take as directed. One day before exam take 2 tablets at 3 PM. Take 2 tablets at 11 PM. (Patient not taking: Reported on 3/15/2023) 4 tablet 0     Calcium Carb-Cholecalciferol (CALCIUM CARBONATE-VITAMIN D3) 600-400 MG-UNIT TABS TAKE ONE TABLET BY MOUTH TWICE A  tablet 3     calcium carbonate 600 mg-vitamin D 400 units (CALTRATE) 600-400 MG-UNIT per tablet Take 1 tablet by mouth 2 times daily (Patient not taking: Reported on 3/15/2023) 120 tablet 3     clopidogrel (PLAVIX) 75 MG tablet Take 1 tablet (75 mg) by mouth daily 90 tablet 3     fluticasone (FLONASE) 50 MCG/ACT nasal spray Spray 1 spray into both nostrils daily (Patient taking differently: Spray 1 spray into both nostrils every morning) 18.2 mL 3     FLUZONE HIGH-DOSE QUADRIVALENT 0.7 ML THIERRY injection        hydrALAZINE (APRESOLINE) 10 MG tablet Take 1 tablet (10 mg) by mouth 3 times daily 270 tablet 3     isosorbide mononitrate (IMDUR) 60 MG 24 hr tablet Take 1  tablet (60 mg) by mouth daily 90 tablet 2     Lactobacillus-Inulin ([facility] DIGESTIVE [facility]) CAPS TAKE ONE CAPSULE BY MOUTH ONCE DAILY (DUE FOR PHYSICAL IN MARCH) 90 capsule 3     lisinopril (ZESTRIL) 2.5 MG tablet Take 1 tablet (2.5 mg) by mouth daily (Patient taking differently: Take 2.5 mg by mouth every morning) 90 tablet 3     metoprolol tartrate (LOPRESSOR) 100 MG tablet Take 1 tablet (100 mg) by mouth daily 180 tablet 2     MODERNA COVID-19 BIVAL BOOSTER 50 MCG/0.5ML injection        Nutritional Supplements (ENSURE CLEAR) LIQD Take 1 Bottle by mouth daily 396 mL 11     ondansetron (ZOFRAN ODT) 4 MG ODT tab Take 1 tablet (4 mg) by mouth every 8 hours as needed for nausea (Patient not taking: Reported on 3/15/2023) 12 tablet 1     pantoprazole (PROTONIX) 40 MG EC tablet Until she can be seen we can have her double the protonix from once a day to twice a day to see if helpful (Patient not taking: Reported on 3/15/2023) 60 tablet 0     predniSONE (DELTASONE) 5 MG tablet Take 1 tablet (5 mg) by mouth daily (Patient taking differently: Take 5 mg by mouth every morning) 90 tablet 3     psyllium (METAMUCIL/KONSYL) 58.6 % powder Take by mouth every morning       sirolimus (GENERIC EQUIVALENT) 2 MG tablet Take 1 tablet (2 mg) by mouth daily 30 tablet 11     sirolimus (GENERIC EQUIVALENT) 2 MG tablet Take by mouth daily       torsemide (DEMADEX) 10 MG tablet Take 1 tablet (10 mg) by mouth daily Additional use as directed by [facility]. 90 tablet 1         ALLERGIES:  Allergies   Allergen Reactions     Blood Transfusion Related (Informational Only) Other (See Comments)     Patient has a history of a clinically significant antibody against RBC antigens.  A delay in compatible RBCs may occur.     Ultracet Nausea and Vomiting and Hives     Hydrocodone Nausea and Vomiting and Hives         PHYSICAL EXAM:  BP (!) 137/90 (BP Location: Right arm, Patient Position: Sitting, Cuff Size: Adult Regular)   Pulse 74   Temp 98   F (36.7  C) (Oral)   Wt 40.4 kg (89 lb)   SpO2 100%   BMI 16.27 kg/m    Wt Readings from Last 4 Encounters:   04/06/23 40.4 kg (89 lb)   03/15/23 40.3 kg (88 lb 14.4 oz)   02/02/23 39.9 kg (88 lb)   01/25/23 42 kg (92 lb 8 oz)     General: Alert, oriented, pleasant, NAD. Very thin.   HEENT: Normocephalic, atraumatic, no icterus.   Neck: No cervical or supraclavicular LAD.  Axillary: No LAD  Lungs: CTA bilaterally, normal work of breathing  Cardiac: RRR, quiet   Abdomen: Soft, nontender, nondistended. Normoactive bowel sounds. No hepatosplenomegaly, masses  Neuro: CNII-XII grossly intact  Extremities: No pedal edema        Labs:    04/06/23 16:39   Sodium 139   Potassium 4.0   Chloride 101   Carbon Dioxide (CO2) 26   Urea Nitrogen 39.7 (H)   Creatinine 1.74 (H)   GFR Estimate 31 (L)   Calcium 9.6   Anion Gap 12   Albumin 4.0   Protein Total 6.7   Alkaline Phosphatase 163 (H)   ALT 38 (H)   AST 43 (H)   Bilirubin Total 0.2   Glucose 120 (H)   WBC 8.7   Hemoglobin 10.3 (L)   Hematocrit 32.8 (L)   Platelet Count 257   RBC Count 3.70 (L)   MCV 89   MCH 27.8   MCHC 31.4 (L)   RDW 14.7   % Neutrophils 87   % Lymphocytes 8   % Monocytes 5   % Eosinophils 0   % Basophils 0   Absolute Basophils 0.0   Absolute Eosinophils 0.0   Absolute Immature Granulocytes 0.0   Absolute Lymphocytes 0.7 (L)   Absolute Monocytes 0.5   % Immature Granulocytes 0   Absolute Neutrophils 7.5   Absolute NRBCs 0.0   NRBCs per 100 WBC 0       CT chest low dose non contrast 3/24/23  IMPRESSION:   1. Markedly enlarging descending thoracic aortic aneurysm, measuring  up to 7.4 cm in maximal dimension, previously measured up to 6.1 cm in  maximal dimension on 3/1/2022. Recommend further evaluation with CTA  of the chest.  2. New/developing 11 mm area of nodularity adjacent to the SBRT site  in the right upper lobe. Findings are suspicious for recurrent  disease. Consider further characterization with PET/CT.     [Access Center: Enlarging descending  thoracic aortic aneurysm]     This report will be copied to the [facility] to ensure a  provider acknowledges the finding. [facility] is available Monday  through Friday 8am-3:30 pm.         I have personally reviewed the examination and initial interpretation  and I agree with the findings.     EMILIE PRESSLEY MD         ASSESSMENT/PLAN:  Maribel Yang is a 72 year old female with:    1) Anal canal carcinoma: history of HPV; [id number]. S/p excional biopsy on 3/14/18, with pathology showing poorly differentiated invasive squamous cell carcinoma, tumor size 7 mm, tumor invades into anal sphincter muscle, resection margins negative for carcinoma and high-grade dysplasia however Invasive tumor is 1 mm from closest margin. It was recommended to proceed with chemoradiation with Xeloda which she completed 6/2018.      She has been followed via surveillance since then.     Per NCCN guidelines, even with T1 disease, she should have ITZEL every 3-6 months for 5 years, inguinal node palpation every 3-6 months for 5 years, anoscopy every 6-12 months for 3 years, and imaging annually for 3 years. Imaging was completed last year.    She was given a treatment plan summary detailing her treatment and long term effects of treatment in August 2020.     -Anoscopy, ITZEL, and inguinal exam UTD. Next due 11/2023.  -Follow-up with medical oncology/survivorship annual through year 5. Through 6/2023.     2) Risk of long-term radiation side effects:   -Chronic bowel dysmotility including diarrhea, clustering, urgency, frequency, and incontinence. Antidiarrheals, fiber, and pelvic floor therapy should be considered.   -Urogenital dysfunction including vaginal dryness, urinary urgency, frequency, or incontinence. Uro-gyn referral if urinary symptoms are present otherwise Replens for vaginal dryness.   -Risk of pelvic fractures/loss of bone density from pelvic XRT. Consider DEXA scans through PCP.     3) History of lung cancer  in 2014: SCC of the RUL, stage [id number] (IA) s/p SBRT.   -Last chest imaging done last week shows concerns for 11 mm of nodularity adjacent to SBRT site. She was presented at Thoracic Tumor Board with recommendation for endobronchial biopsy through Interventional Pulmonology. Unlikely to be recurrence or radiation fibrosis since SBRT was over 9 years ago.   -NCCN guidelines recommend annual low-dose non-contrast chest CT.     4) Alport's disease s/p renal transplant in 2004: followed by Dr. See.    -on immunosuppression, as per nephrology. Cr has been stable     5) MGUS: Labs in process today, will follow-up on these. Should be followed annually.     6) Cervical and vaginal dysplasia:underwent CO2 laser ablation procedure on 9/25/18 by Dr. Garcia. Should have regular PAPs/exam through GYN or PCP. Last PAP and colposcopy done 9/2020. Overdue for follow-up.    7) History of basal and squamous cell carcinoma of skin: Continued follow-up with Dermatology. UTD.     8) Chronic systolic heart failure secondary to ICM has had LIUDMILA to RCA. Hx of PAD with EVAR and bypass graft for AAA in 4/2021. Follows with [facility].     9) Enlarging thoracic aortic aneurysm: Referral back to Vascular Surgery, Dr. Ferrara. Was lost in follow-up with them.     11) Routine health maintenance/wellness guidelines:   -Continue to undergo regular preventative health screening, cancer screening, and immunizations with PCP.   -Maintain a healthy body weight throughout life; encouraged her to try plant based protein powder to help gain weight.   -Adhere to a healthy diet which is plant-based.   -Exercise regularly, ideally 30 minutes of moderate intensity exercise most days of the week.   -Limit alcohol consumption. No more than 1 drink per day for women.   -Tobacco cessation     60 minutes spent on the date of the encounter doing chart review, review of test results, interpretation of tests, patient visit, documentation and discussion with other  provider(s)     Angelica Ribeiro PA-C

## 2023-04-06 NOTE — NURSING NOTE
"Oncology Rooming Note    April 6, 2023 4:55 PM   Maribel Yang is a 72 year old female who presents for:    Chief Complaint   Patient presents with     Oncology Clinic Visit     Malignant neoplasm of anal canal      Initial Vitals: There were no vitals taken for this visit. Estimated body mass index is 16.26 kg/m  as calculated from the following:    Height as of 3/15/23: 1.575 m (5' 2.01\").    Weight as of 3/15/23: 40.3 kg (88 lb 14.4 oz). There is no height or weight on file to calculate BSA.  Data Unavailable Comment: Data Unavailable   No LMP recorded. Patient is postmenopausal.  Allergies reviewed: Yes  Medications reviewed: Yes    Medications: Medication refills not needed today.  Pharmacy name entered into [facility]:    [facility]  [facility] - [place], [place] - [address] [place] [address] [facility] [place] - [address] [place] [address] [address]  [facility] #[id number] - [address] AT [address] [id number] - [place], [place] - [address] [place] [address] AT [address] [place] [address]    Clinical concerns: none.      Amari Dominique"

## 2023-04-06 NOTE — LETTER
4/6/2023         RE: Maribel Yang  [address]  [facility] [address]        Dear Colleague,    Thank you for referring your patient, Maribel Yang, to the [facility]. Please see a copy of my visit note below.    [facility]    Hematology/Oncology Established Patient Note  Apr 6, 2023      Reason for Follow-up: anal canal carcinoma      HISTORY OF PRESENT ILLNESS: Maribel Yang is a 72 year old female with PMHx of kidney transplant in 2004, lung cancer in 2014 (SCC of the RUL, stage [id number] (IA) s/p SBRT by Dr. Mckeon), HPV, PAD, who presents with anal canal carcinoma.   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 Renteria.  She has undergone high resolution anoscopy with biopsy, which showed superficially invasive squamous cell carcinoma.  On 3/14/18, she underwent exam under anesthesia with full thickness excision of superficially invasive anal cancer by Dr. Leo.  Pathology showed poorly differentiated invasive squamous cell carcinoma, tumor size 7 mm, tumor invades into anal sphincter muscle, resection margins negative for carcinoma and high-grade dysplasia.  Invasive tumor is 1 mm from closest margin.  There is background high grade squamous intraepithelial lesion (AIN 3).  It was staged pT1.  She has MRI pelvis on 2/28/18 that showed no pelvic or inguinal lymphadenopathy.    Patient was discussed at tumor conference, and consensus was that no further surgery was feasible, and recommendation is for chemoradiation, but with Xeloda without mitomycin, considering her co-morbidities and history of renal transplant on immunosuppression.    She started chemoradiation on 4/30/18 and completed it on 6/11/18. She presents in routine surveillance.       INTERIM HISTORY: Maribel is overall feeling well. Clinical condition is stable. She is able to move around her house well.  Does stairs 4-5x per day without difficulty. Can wash the floor and do other chores. Does not walk much outside the home. Breathing has been stable overall. No significant cough. No fevers or recent infections. She had episodes of n/v/d after reacting to a transplant medication last month which required an ED visit.     No CP. No abdominal pain. Bowels at baseline. Appetite is low but stable. Weight has been stable but she would like to gain more. She is drinking 1 Ensure per day. Could drink more but limited by cost.       REVIEW OF SYSTEMS:   14 point ROS was reviewed and is negative other than as noted above in HPI.       HOME MEDICATIONS:  Current Outpatient Medications   Medication Sig Dispense Refill    acetaminophen (TYLENOL) 325 MG tablet Take 2 tablets (650 mg) by mouth every 4 hours as needed for other (For optimal non-opioid multimodal pain management to improve pain control.) 100 tablet 3    aspirin (ASA) 81 MG chewable tablet Take 1 tablet (81 mg) by mouth daily (Patient taking differently: Take 81 mg by mouth every morning) 90 tablet 3    atorvastatin (LIPITOR) 80 MG tablet Take 1 tablet (80 mg) by mouth daily (Patient taking differently: Take 80 mg by mouth every morning) 90 tablet 3    bisacodyl (DULCOLAX) 5 MG EC tablet Take as directed. One day before exam take 2 tablets at 3 PM. Take 2 tablets at 11 PM. (Patient not taking: Reported on 3/15/2023) 4 tablet 0    Calcium Carb-Cholecalciferol (CALCIUM CARBONATE-VITAMIN D3) 600-400 MG-UNIT TABS TAKE ONE TABLET BY MOUTH TWICE A  tablet 3    calcium carbonate 600 mg-vitamin D 400 units (CALTRATE) 600-400 MG-UNIT per tablet Take 1 tablet by mouth 2 times daily (Patient not taking: Reported on 3/15/2023) 120 tablet 3    clopidogrel (PLAVIX) 75 MG tablet Take 1 tablet (75 mg) by mouth daily 90 tablet 3    fluticasone (FLONASE) 50 MCG/ACT nasal spray Spray 1 spray into both nostrils daily (Patient taking differently: Spray 1 spray into both nostrils  every morning) 18.2 mL 3    FLUZONE HIGH-DOSE QUADRIVALENT 0.7 ML THIERRY injection       hydrALAZINE (APRESOLINE) 10 MG tablet Take 1 tablet (10 mg) by mouth 3 times daily 270 tablet 3    isosorbide mononitrate (IMDUR) 60 MG 24 hr tablet Take 1 tablet (60 mg) by mouth daily 90 tablet 2    Lactobacillus-Inulin ([facility] DIGESTIVE [facility]) CAPS TAKE ONE CAPSULE BY MOUTH ONCE DAILY (DUE FOR PHYSICAL IN MARCH) 90 capsule 3    lisinopril (ZESTRIL) 2.5 MG tablet Take 1 tablet (2.5 mg) by mouth daily (Patient taking differently: Take 2.5 mg by mouth every morning) 90 tablet 3    metoprolol tartrate (LOPRESSOR) 100 MG tablet Take 1 tablet (100 mg) by mouth daily 180 tablet 2    MODERNA COVID-19 BIVAL BOOSTER 50 MCG/0.5ML injection       Nutritional Supplements (ENSURE CLEAR) LIQD Take 1 Bottle by mouth daily 396 mL 11    ondansetron (ZOFRAN ODT) 4 MG ODT tab Take 1 tablet (4 mg) by mouth every 8 hours as needed for nausea (Patient not taking: Reported on 3/15/2023) 12 tablet 1    pantoprazole (PROTONIX) 40 MG EC tablet Until she can be seen we can have her double the protonix from once a day to twice a day to see if helpful (Patient not taking: Reported on 3/15/2023) 60 tablet 0    predniSONE (DELTASONE) 5 MG tablet Take 1 tablet (5 mg) by mouth daily (Patient taking differently: Take 5 mg by mouth every morning) 90 tablet 3    psyllium (METAMUCIL/KONSYL) 58.6 % powder Take by mouth every morning      sirolimus (GENERIC EQUIVALENT) 2 MG tablet Take 1 tablet (2 mg) by mouth daily 30 tablet 11    sirolimus (GENERIC EQUIVALENT) 2 MG tablet Take by mouth daily      torsemide (DEMADEX) 10 MG tablet Take 1 tablet (10 mg) by mouth daily Additional use as directed by [facility]. 90 tablet 1         ALLERGIES:  Allergies   Allergen Reactions    Blood Transfusion Related (Informational Only) Other (See Comments)     Patient has a history of a clinically significant antibody against RBC antigens.  A delay in compatible RBCs may occur.     Ultracet Nausea and Vomiting and Hives    Hydrocodone Nausea and Vomiting and Hives         PHYSICAL EXAM:  BP (!) 137/90 (BP Location: Right arm, Patient Position: Sitting, Cuff Size: Adult Regular)   Pulse 74   Temp 98  F (36.7  C) (Oral)   Wt 40.4 kg (89 lb)   SpO2 100%   BMI 16.27 kg/m    Wt Readings from Last 4 Encounters:   04/06/23 40.4 kg (89 lb)   03/15/23 40.3 kg (88 lb 14.4 oz)   02/02/23 39.9 kg (88 lb)   01/25/23 42 kg (92 lb 8 oz)     General: Alert, oriented, pleasant, NAD. Very thin.   HEENT: Normocephalic, atraumatic, no icterus.   Neck: No cervical or supraclavicular LAD.  Axillary: No LAD  Lungs: CTA bilaterally, normal work of breathing  Cardiac: RRR, quiet   Abdomen: Soft, nontender, nondistended. Normoactive bowel sounds. No hepatosplenomegaly, masses  Neuro: CNII-XII grossly intact  Extremities: No pedal edema        Labs:    04/06/23 16:39   Sodium 139   Potassium 4.0   Chloride 101   Carbon Dioxide (CO2) 26   Urea Nitrogen 39.7 (H)   Creatinine 1.74 (H)   GFR Estimate 31 (L)   Calcium 9.6   Anion Gap 12   Albumin 4.0   Protein Total 6.7   Alkaline Phosphatase 163 (H)   ALT 38 (H)   AST 43 (H)   Bilirubin Total 0.2   Glucose 120 (H)   WBC 8.7   Hemoglobin 10.3 (L)   Hematocrit 32.8 (L)   Platelet Count 257   RBC Count 3.70 (L)   MCV 89   MCH 27.8   MCHC 31.4 (L)   RDW 14.7   % Neutrophils 87   % Lymphocytes 8   % Monocytes 5   % Eosinophils 0   % Basophils 0   Absolute Basophils 0.0   Absolute Eosinophils 0.0   Absolute Immature Granulocytes 0.0   Absolute Lymphocytes 0.7 (L)   Absolute Monocytes 0.5   % Immature Granulocytes 0   Absolute Neutrophils 7.5   Absolute NRBCs 0.0   NRBCs per 100 WBC 0       CT chest low dose non contrast 3/24/23  IMPRESSION:   1. Markedly enlarging descending thoracic aortic aneurysm, measuring  up to 7.4 cm in maximal dimension, previously measured up to 6.1 cm in  maximal dimension on 3/1/2022. Recommend further evaluation with CTA  of the chest.  2.  New/developing 11 mm area of nodularity adjacent to the SBRT site  in the right upper lobe. Findings are suspicious for recurrent  disease. Consider further characterization with PET/CT.     [Access Center: Enlarging descending thoracic aortic aneurysm]     This report will be copied to the [facility] to ensure a  provider acknowledges the finding. [facility] is available Monday  through Friday 8am-3:30 pm.         I have personally reviewed the examination and initial interpretation  and I agree with the findings.     EMILIE PRESSLEY MD         ASSESSMENT/PLAN:  Maribel Yang is a 72 year old female with:    1) Anal canal carcinoma: history of HPV; [id number]. S/p excional biopsy on 3/14/18, with pathology showing poorly differentiated invasive squamous cell carcinoma, tumor size 7 mm, tumor invades into anal sphincter muscle, resection margins negative for carcinoma and high-grade dysplasia however Invasive tumor is 1 mm from closest margin. It was recommended to proceed with chemoradiation with Xeloda which she completed 6/2018.      She has been followed via surveillance since then.     Per NCCN guidelines, even with T1 disease, she should have ITZEL every 3-6 months for 5 years, inguinal node palpation every 3-6 months for 5 years, anoscopy every 6-12 months for 3 years, and imaging annually for 3 years. Imaging was completed last year.    She was given a treatment plan summary detailing her treatment and long term effects of treatment in August 2020.     -Anoscopy, ITZEL, and inguinal exam UTD. Next due 11/2023.  -Follow-up with medical oncology/survivorship annual through year 5. Through 6/2023.     2) Risk of long-term radiation side effects:   -Chronic bowel dysmotility including diarrhea, clustering, urgency, frequency, and incontinence. Antidiarrheals, fiber, and pelvic floor therapy should be considered.   -Urogenital dysfunction including vaginal dryness, urinary urgency, frequency, or  incontinence. Uro-gyn referral if urinary symptoms are present otherwise Replens for vaginal dryness.   -Risk of pelvic fractures/loss of bone density from pelvic XRT. Consider DEXA scans through PCP.     3) History of lung cancer in 2014: SCC of the RUL, stage [id number] (IA) s/p SBRT.   -Last chest imaging done last week shows concerns for 11 mm of nodularity adjacent to SBRT site. She was presented at Thoracic Tumor Board with recommendation for endobronchial biopsy through Interventional Pulmonology. Unlikely to be recurrence or radiation fibrosis since SBRT was over 9 years ago.   -NCCN guidelines recommend annual low-dose non-contrast chest CT.     4) Alport's disease s/p renal transplant in 2004: followed by Dr. See.    -on immunosuppression, as per nephrology. Cr has been stable     5) MGUS: Labs in process today, will follow-up on these. Should be followed annually.     6) Cervical and vaginal dysplasia:underwent CO2 laser ablation procedure on 9/25/18 by Dr. Garcia. Should have regular PAPs/exam through GYN or PCP. Last PAP and colposcopy done 9/2020. Overdue for follow-up.    7) History of basal and squamous cell carcinoma of skin: Continued follow-up with Dermatology. UTD.     8) Chronic systolic heart failure secondary to ICM has had LIUDMILA to RCA. Hx of PAD with EVAR and bypass graft for AAA in 4/2021. Follows with [facility].     9) Enlarging thoracic aortic aneurysm: Referral back to Vascular Surgery, Dr. Ferrara. Was lost in follow-up with them.     11) Routine health maintenance/wellness guidelines:   -Continue to undergo regular preventative health screening, cancer screening, and immunizations with PCP.   -Maintain a healthy body weight throughout life; encouraged her to try plant based protein powder to help gain weight.   -Adhere to a healthy diet which is plant-based.   -Exercise regularly, ideally 30 minutes of moderate intensity exercise most days of the week.   -Limit alcohol consumption. No  more than 1 drink per day for women.   -Tobacco cessation     60 minutes spent on the date of the encounter doing chart review, review of test results, interpretation of tests, patient visit, documentation and discussion with other provider(s)     Angelica Ribeiro PA-C

## 2023-04-07 NOTE — PROGRESS NOTES
New Patient: Interventional Pulmonary (Lung nodule) Nurse Navigator Note    Referring provider: Angelica Ribeiro PA-[facility]     Referred to (specialty): Interventional Pulmonary (Lung nodule)    Requested provider (if applicable): n/a    Date Referral Received: 4/6/2023    Evaluation for :  Lung nodule    Clinical History (per Nurse review of records provided):    **BOOK MARKED**    3/24/23   CT Chest Low Dose Non Contrast   IMPRESSION:   1. Markedly enlarging descending thoracic aortic aneurysm, measuring up to 7.4 cm in maximal dimension, previously measured up to 6.1 cm in maximal dimension on 3/1/2022. Recommend further evaluation with CTA of the chest.  2. New/developing 11 mm area of nodularity adjacent to the SBRT site in the right upper lobe. Findings are suspicious for recurrent disease. Consider further characterization with PET/CT.        CT Chest Low Dose Non Contrast   3/1/22   Impression:   1. Similar appearance of right upper lobe spiculated nodule measuring to 27 mm status post SBRT. Appears relatively stable going back to 8/29/2016.  2. Remainder of some 6 mm pulmonary nodules are unchanged.  3. Similar appearance of descending thoracic aortic aneurysm measuring up to 56 mm, better characterized on CT chest abdomen and pelvis 8/25/2021 due to the presence of intravenous contrast.  4. 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  5. Trace pericardial effusion.        Records Location (Care Everywhere, Media, etc.): [facility]     Records Needed: none    Additional testing needed prior to consult: PFT's

## 2023-04-25 NOTE — NURSING NOTE
"Chief Complaint   Patient presents with     Follow Up     6 month anoscopy follow up.       Vitals:    02/08/22 1106   BP: (!) 140/91   BP Location: Left arm   Patient Position: Sitting   Cuff Size: Adult Small   Pulse: 70   Temp: 97.5  F (36.4  C)   TempSrc: Oral   SpO2: 99%   Weight: 89 lb 4.8 oz   Height: 5' 1.25\"       Body mass index is 16.74 kg/m .                        Chantell Shay CMA    " Patient reports the Ozempic was the medication she was looking into. Willard please advise.

## 2023-04-26 NOTE — TELEPHONE ENCOUNTER
RECORDS STATUS - ALL OTHER DIAGNOSIS      RECORDS RECEIVED FROM: [facility]   DATE RECEIVED:    NOTES STATUS DETAILS   OFFICE NOTE from referring provider Epic Angelica Ribeiro PA-C in [facility]: 23   OFFICE NOTE from medical oncologist [facility] Dr. Juan Rene: 20    Dr. Meggan Tucker: 18 - 19    Dr. Liliana Renteria: 13 - 17   DISCHARGE SUMMARY from hospital [facility] 23, 22, 21, 21, 21, 21, 18, 3/14/18, 17, 16, 7/15/14, 14, 13, 12, 05, 04, 10/1/04, 04, 04, 03, 10/23/09   DISCHARGE REPORT from the ER [facility] 3/2/23, 21, 3/7/20, 16, 16, 14   OPERATIVE REPORT Epic 23: EGD  22: Colonoscopy  21: Echocardiogram  21: Irrigation & Debridement  21: Femoral to Femoral Bypass, Coronary Angiogram  21L Angiogram  18: Laser Ablation of Upper Vagina & Cervix  3/14/18: Anal Exam Under Anesthesia  17: Colonoscopy  16, 7/15/14, 13: Colposcopy  09: Exam under anesthesia, excision and fulguration of anal rectal condylomata, anal biopsy  04: Living related kidney transplant   MEDICATION LIST Epic 23   PFT Epic 23, 14, 12, 12, 2/10/12   LABS     ANYTHING RELATED TO DIAGNOSIS Epic 23   IMAGING (NEED IMAGES & REPORT)     CT SCANS PACS [facility]   MRI PACS [facility]   MAMMO PACS Epic   ULTRASOUND PACS Epic   PET PACS Epic

## 2023-05-02 NOTE — PROGRESS NOTES
No show. Pt needs to be rescheduled.     Nii Mckeon MD, MHA  Associate Professor of Medicine  Section of Interventional Pulmonology   Division of Pulmonary, Allergy, Critical Care and Sleep Medicine   [facility]  Pager: [phone]   Office: [phone]  Email: [email or web address]

## 2023-05-02 NOTE — NURSING NOTE
Is the patient currently in the state of [place]? YES    Visit mode:VIDEO    If the visit is dropped, the patient can be reconnected by: VIDEO VISIT: Send to e-mail at: [email or web address]    Will anyone else be joining the visit? NO      How would you like to obtain your AVS? MyChart    Are changes needed to the allergy or medication list? NO    Reason for visit: Video Visit (New apt )      Keyla Main

## 2023-05-02 NOTE — LETTER
5/2/2023       RE: Maribel Yang  [address]  [facility] [address]     Dear Colleague,    Thank you for referring your patient, Maribel Yang, [facility]. Please see a copy of my visit note below.    No show. Pt needs to be rescheduled.     Nii Mckeon MD, A  Associate Professor of Medicine  Section of Interventional Pulmonology   Division of Pulmonary, Allergy, Critical Care and Sleep Medicine   [facility]  Pager: [phone]   Office: [phone]  Email: [email or web address]          Again, thank you for allowing me to participate in the care of your patient.      Sincerely,    Nii Mckeon MD

## 2023-05-05 NOTE — TELEPHONE ENCOUNTER
metoprolol tartrate (LOPRESSOR) 100 MG    Last Written Prescription Date:  1/25/23  hx  Last Fill Quantity: 180,   # refills: 2  Last Office Visit : 3/15/23  Future Office visit:  9/6/23  Routing refill request to provider for review/approval because:  Medication is reported/historical

## 2023-05-12 NOTE — PLAN OF CARE
VSS. Alert and orientedx4. Pleasant and cooperative. Able to verblaized needs. Pain is better compared last night per pt, and does not want any pain meds at this time. Able to verbalized needs and used call light appropriately. PICC dressing changed, site WDL, cap changed, both lumen has blood return. Continued on IV ancef. Wound vac to Bilateral groin dressing CDI, with serosanguinous output to cannister.   +BM soft medium thus shift.  Sacral mepilex:CDI.  Likes samanta wipes at bedtime.      Patient's most recent vital signs are:     Vital signs:  BP: 112/86  Temp: 98.3  HR: 98  RR: 18  SpO2: 98 %     Patient does not have new respiratory symptoms.  Patient does not have new sore throat.  Patient does not have a fever greater than 99.5.              negative...

## 2023-06-02 NOTE — TELEPHONE ENCOUNTER
3/15/2023  [facility] Marguerite Washburn APRN CNP  Cardiovascular Disease     LDL done 3/1/23

## 2023-06-07 NOTE — NURSING NOTE
Patient confirms medications and allergies are accurate via patients echeck in completion, and or denies any changes since last reviewed/verified.     Is the patient currently in the state of [place]? YES    Visit mode:VIDEO    If the visit is dropped, the patient can be reconnected by: VIDEO VISIT: Text to cell phone: [phone]    Will anyone else be joining the visit? NO      How would you like to obtain your AVS? MyChart    Are changes needed to the allergy or medication list? NO    Reason for visit: Follow Up            Julissa Rush, Mine Facilitator

## 2023-06-07 NOTE — PROGRESS NOTES
Vascular Surgery Consultation Note     Patient:  Maribel Yang   Date of birth 1950, Medical record number [id number]  Date of Visit:  06/07/2023  Consult Requester:No att. providers found            Assessment and Recommendations:   ASSESSMENT / RECOMMENDATION:  Very pleasant 72-year-old female with a history of squamous cell lung cancer and anal cancer status post kidney transplant in the past with a patent functioning aorto uniiliac endovascular stent graft and femoral-femoral bypass for treatment of her abdominal aortic aneurysm.  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  We would not plan for any type of intervention on this until it reached near 6 cm given her comorbidities.  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  This is at the area of the anastomosis of the cadaveric femoral-femoral bypass to the right common femoral artery.  She had infection of the collagen Artegraft placed at the time of the endovascular repair.  This was exchanged for a cadaveric femoral-femoral graft and sartorius muscle flap was placed over it back in 2021.  Since that time the pseudoaneurysm has grown from approximately 2cm to 4.6 cm.  There is also a stenosis at the graft anastomosis in the right common femoral artery.  I have proposed that we repair this as soon as able given the large size.  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  I do believe it would be important for her to have 1 to 2 weeks of Bactrim antibiotic suppression afterward.  She had a difficult time after her aneurysm surgery previously.  I have reached out to her cardiology nurse practitioner, Marguerite Muñoz, for any additional risk factor modification prior to the procedure.  We would have her remain on her aspirin but stop her Plavix preprocedure.  We discussed a 1-2  night stay in the hospital to ensure she is safe and doing well.  I will reach out to Maribel once I hear back from Marguerite regarding proceeding.      Many thanks for involving me in the care of this very pleasant patient. Should any questions or concerns arise, please don't hesitate to contact me.    Warm Regards,    Abena Ferrara MD, DFSVS, RPVI  Director, [facility] Vascular Services  Professor and Chief, Vascular and Endovascular Surgery  [facility]  Pager: 1. Send message or 10 digit call back number Securely via [phone] with the Vocera Web Console (learn more here)              2. Outside of [facility]? Call [phone]        60 minutes spent by me on the date of the encounter doing chart review, review of outside records, review of test results, interpretation of tests, patient visit and documentation           HPI: Maribel is a very pleasant 72-year-old female seen on video virtual visit today for follow-up of a complex aortic aneurysm repair I did back in April 2021.  At approximately 3 weeks postop, she had infection of her collagen Artegraft which had to be resected and replaced with cadaveric femoral-femoral artery.  She had a follow-up CT scan in 2021 but has not seen us back in follow-up given COVID and multiple issues.  Her postoperative course was complicated by a myocardial infarction which has been difficult for her to recover overall.  She states that she stays indoors most of the time.      Review of Systems   Constitutional, HEENT, cardiovascular, pulmonary, gi and gu systems are negative, except as otherwise noted.    Physical Exam   GENERAL: Healthy, alert and no distress  EYES: Eyes grossly normal to inspection.  No discharge or erythema, or obvious scleral/conjunctival abnormalities.  RESP: No audible wheeze, cough, or visible cyanosis.  No visible retractions or increased work of breathing.    SKIN: Visible skin clear. No significant rash, abnormal pigmentation or  lesions.  NEURO: Cranial nerves grossly intact.  Mentation and speech appropriate for age.  PSYCH: Mentation appears normal, affect normal/bright, judgement and insight intact, normal speech and appearance well-groomed.    Imaging: Approximately 5.1 cm thoracoabdominal aneurysm with the largest area at the level of the diaphragm.  The aorto uniiliac stent graft is patent with a decrease in size of the aneurysm sac since repair in 2021.  Her femoral-femoral bypass is patent however there appears to be a stenosis at the right femoral anastomosis with a 4.6 cm pseudoaneurysm.  The superficial femoral artery appears to be occluded on the right with a patent profunda femoris artery.          Maribel is a 72 year old who is being evaluated via a billable video visit.      How would you like to obtain your AVS? MyChart  If the video visit is dropped, the invitation should be resent by: Text to cell phone: [phone]  Will anyone else be joining your video visit? No      Video-Visit Details    Type of service:  Video Visit   Video Start Time: 2 PM  Video End Time:2:40 PM    Originating Location (pt. Location): Home  Distant Location (provider location):  Off-site  Platform used for Video Visit: [employer]

## 2023-06-07 NOTE — LETTER
6/7/2023       RE: Maribel Yang  [address]  [facility] [address]     Dear Colleague,    Thank you for referring your patient, Maribel Yang, to the [facility]. Please see a copy of my visit note below.      Vascular Surgery Consultation Note     Patient:  Maribel Yang   Date of birth 1950, Medical record number [id number]  Date of Visit:  06/07/2023  Consult Requester:No att. providers found            Assessment and Recommendations:   ASSESSMENT / RECOMMENDATION:  Very pleasant 72-year-old female with a history of squamous cell lung cancer and anal cancer status post kidney transplant in the past with a patent functioning aorto uniiliac endovascular stent graft and femoral-femoral bypass for treatment of her abdominal aortic aneurysm.  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  We would not plan for any type of intervention on this until it reached near 6 cm given her comorbidities.  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  This is at the area of the anastomosis of the cadaveric femoral-femoral bypass to the right common femoral artery.  She had infection of the collagen Artegraft placed at the time of the endovascular repair.  This was exchanged for a cadaveric femoral-femoral graft and sartorius muscle flap was placed over it back in 2021.  Since that time the pseudoaneurysm has grown from approximately 2cm to 4.6 cm.  There is also a stenosis at the graft anastomosis in the right common femoral artery.  I have proposed that we repair this as soon as able given the large size.  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  I do believe it would be important for her to have 1 to 2 weeks of Bactrim antibiotic  suppression afterward.  She had a difficult time after her aneurysm surgery previously.  I have reached out to her cardiology nurse practitioner, Marguerite Muñoz, for any additional risk factor modification prior to the procedure.  We would have her remain on her aspirin but stop her Plavix preprocedure.  We discussed a 1-2 night stay in the hospital to ensure she is safe and doing well.  I will reach out to Maribel once I hear back from Marguerite regarding proceeding.      Many thanks for involving me in the care of this very pleasant patient. Should any questions or concerns arise, please don't hesitate to contact me.    Warm Regards,    Abena Ferrara MD, DFSVS, RPVI  Director, [facility] Vascular Services  Professor and Chief, Vascular and Endovascular Surgery  [facility]  Pager: 1. Send message or 10 digit call back number Securely via [employer] with the Vocera Web Console (learn more here)              2. Outside of [facility]? Call [phone]        60 minutes spent by me on the date of the encounter doing chart review, review of outside records, review of test results, interpretation of tests, patient visit and documentation           HPI: Maribel is a very pleasant 72-year-old female seen on video virtual visit today for follow-up of a complex aortic aneurysm repair I did back in April 2021.  At approximately 3 weeks postop, she had infection of her collagen Artegraft which had to be resected and replaced with cadaveric femoral-femoral artery.  She had a follow-up CT scan in 2021 but has not seen us back in follow-up given COVID and multiple issues.  Her postoperative course was complicated by a myocardial infarction which has been difficult for her to recover overall.  She states that she stays indoors most of the time.      Review of Systems   Constitutional, HEENT, cardiovascular, pulmonary, gi and gu systems are negative, except as otherwise noted.    Physical Exam   GENERAL: Healthy, alert  and no distress  EYES: Eyes grossly normal to inspection.  No discharge or erythema, or obvious scleral/conjunctival abnormalities.  RESP: No audible wheeze, cough, or visible cyanosis.  No visible retractions or increased work of breathing.    SKIN: Visible skin clear. No significant rash, abnormal pigmentation or lesions.  NEURO: Cranial nerves grossly intact.  Mentation and speech appropriate for age.  PSYCH: Mentation appears normal, affect normal/bright, judgement and insight intact, normal speech and appearance well-groomed.    Imaging: Approximately 5.1 cm thoracoabdominal aneurysm with the largest area at the level of the diaphragm.  The aorto uniiliac stent graft is patent with a decrease in size of the aneurysm sac since repair in 2021.  Her femoral-femoral bypass is patent however there appears to be a stenosis at the right femoral anastomosis with a 4.6 cm pseudoaneurysm.  The superficial femoral artery appears to be occluded on the right with a patent profunda femoris artery.          Maribel is a 72 year old who is being evaluated via a billable video visit.      How would you like to obtain your AVS? MyChart  If the video visit is dropped, the invitation should be resent by: Text to cell phone: [phone]  Will anyone else be joining your video visit? No      Video-Visit Details    Type of service:  Video Visit   Video Start Time: 2 PM  Video End Time:2:40 PM    Originating Location (pt. Location): Home  Distant Location (provider location):  Off-site  Platform used for Video Visit: [employer]

## 2023-06-08 NOTE — PROGRESS NOTES
Marguerite Muñoz NP recommending pre-op clearance prior to vascular surgery which will be in next few weeks. Have openings in clinic next week with Nicolette Vasquez NP in general cardiology for clearance.   Called Maribel to schedule. Left message and asked for call back. Mychart message also sent. Cynthia Blackwood RN

## 2023-06-08 NOTE — TELEPHONE ENCOUNTER
Called pt to offer 6/20 OR 6/23 surgery date with Dr. Ferrara and got no answer. Left voicemail message with direct call back number [phone].    Hallie Vasquez on 6/8/2023 at 11:33 AM

## 2023-06-14 NOTE — TELEPHONE ENCOUNTER
Called pt to offer 6/27 or 6/30 surgery date with Dr. Ferrara and got no answer. Left voicemail message with direct call back number [phone].    Hallie Vasquez on 6/14/2023 at 2:43 PM

## 2023-06-14 NOTE — NURSING NOTE
Chief Complaint   Patient presents with     Follow Up     Return Cardiology- Marguerite Muñoz NP recommending pre-op clearance prior to vascular surgery which will be in next few weeks.       Vitals were taken and medications reconciled.    RODERICK Yeboah  12:47 PM

## 2023-06-14 NOTE — TELEPHONE ENCOUNTER
Spoke with patient to schedule procedure with Dr. Ferrara   Procedure was scheduled on 6/27 at [facility] OR  Patient will have H&P with PCP    Patient is aware a COVID-19 test is needed before their procedure.   Patient will have a Home COVID 19 test per surgeon preference       PO Visit scheduled on: 7/10 & 7/19      Patient is aware a / is needed day of surgery.   Surgery Letter was sent via [employer].   Patient has my direct contact information for any further questions.     Vendor requested Vendor was not requested in case request
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
## 2023-06-14 NOTE — PATIENT INSTRUCTIONS
Nicolette Vasquez recommends:    Continue taking the additional 10 MG torsemide every other day as needed.    Follow up as needed.    Thank you for your visit today.  Please call me with any questions or concerns.   Amanuel Castro RN  Cardiology Care Coordinator  [phone]
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
## 2023-06-15 NOTE — TELEPHONE ENCOUNTER
Post-op imaging scheduled. Anticoagulation instructions sent to pt via [employer].    ----------------------------------------------------------------  BALDO Perea, RN  RN Care Coordinator  [facility] Vascular Surgery - [facility] phone: [phone]  Fax: [phone]

## 2023-06-19 NOTE — TELEPHONE ENCOUNTER
PN,  Please see below [employer] message and advise.  Can schedule with other provider, but wanted to see if you wanted to work in.  Thanks,  Wilda GANNON RN

## 2023-06-20 NOTE — TELEPHONE ENCOUNTER
Attempted to call patient x 2   Left message for patient to call [facility] back  When patient calls back please scheduled patient for a Pre-op today at  1430 or tomorrow 10 am spot with Dr. JOSE LAURA RN   [facility]

## 2023-06-21 NOTE — RESULT ENCOUNTER NOTE
Dear Maribel,   Your CBC shows that your anemia is a little worse -   I sent a message to Dr. Ferrara so she is aware but the plan is still to move forward with surgery.  Let us know if you have any questions.  -Lisa Martinez, DO

## 2023-06-21 NOTE — PROGRESS NOTES
[facility]  [address], [address]  [facility] [address]  Phone: [phone]  Primary Provider: Lisa Garcia  Pre-op Performing Provider: LISA GARCIA       PREOPERATIVE EVALUATION:  Today's date: 6/21/2023    Maribel Yang is a 72 year old female who presents for a preoperative evaluation.      6/21/2023    10:11 AM   Additional Questions   Roomed by alem romo   Accompanied by n/a         6/21/2023    10:11 AM   Patient Reported Additional Medications   Patient reports taking the following new medications n/a     Surgical Information:  Surgery/Procedure: REPAIR, PSEUDOANEURYSM, ARTERY, FEMORAL with cadaveric femoral-popliteal artery  Surgery Location: [facility]    Surgeon: Abena Ferrara MD  Surgery Date: 06/27/223  Time of Surgery: 8:00  Where patient plans to recover: At home with family  Fax number for surgical facility: Note does not need to be faxed, will be available electronically in Epic.    Assessment & Plan     The proposed surgical procedure is considered HIGH risk.    Preop general physical exam     - EKG 12-lead complete w/read - Clinics  - Comprehensive metabolic panel (BMP + Alb, Alk Phos, ALT, AST, Total. Bili, TP); Future  - CBC with platelets; Future  - Comprehensive metabolic panel (BMP + Alb, Alk Phos, ALT, AST, Total. Bili, TP)  - CBC with platelets    Thoracoabdominal aortic aneurysm (TAAA) without rupture, unspecified part (H)     - Comprehensive metabolic panel (BMP + Alb, Alk Phos, ALT, AST, Total. Bili, TP); Future  - CBC with platelets; Future  - Comprehensive metabolic panel (BMP + Alb, Alk Phos, ALT, AST, Total. Bili, TP)  - CBC with platelets    Pseudoaneurysm of right femoral artery (H)     - Comprehensive metabolic panel (BMP + Alb, Alk Phos, ALT, AST, Total. Bili, TP); Future  - CBC with platelets; Future  - Comprehensive metabolic panel (BMP + Alb, Alk Phos, ALT, AST, Total. Bili, TP)  - CBC  with platelets    Immunosuppression (H)  Hx of kidney transplant   On sirolimus and chronic prednisone 5mg daily   - Comprehensive metabolic panel (BMP + Alb, Alk Phos, ALT, AST, Total. Bili, TP); Future  - CBC with platelets; Future  - Comprehensive metabolic panel (BMP + Alb, Alk Phos, ALT, AST, Total. Bili, TP)  - CBC with platelets    Kidney replaced by transplant     - Comprehensive metabolic panel (BMP + Alb, Alk Phos, ALT, AST, Total. Bili, TP); Future  - CBC with platelets; Future  - Comprehensive metabolic panel (BMP + Alb, Alk Phos, ALT, AST, Total. Bili, TP)  - CBC with platelets    Coronary artery disease involving native coronary artery of native heart with angina pectoris with documented spasm (H)  Inferior STEMI s/p RCA stent 4/7/2021  - Comprehensive metabolic panel (BMP + Alb, Alk Phos, ALT, AST, Total. Bili, TP); Future  - CBC with platelets; Future  - Comprehensive metabolic panel (BMP + Alb, Alk Phos, ALT, AST, Total. Bili, TP)  - CBC with platelets    History of DVT (deep vein thrombosis)     - Comprehensive metabolic panel (BMP + Alb, Alk Phos, ALT, AST, Total. Bili, TP); Future  - CBC with platelets; Future  - Comprehensive metabolic panel (BMP + Alb, Alk Phos, ALT, AST, Total. Bili, TP)  - CBC with platelets    Congestive heart failure, unspecified HF chronicity, unspecified heart failure type (H)  HFrEF 30-35%  - Comprehensive metabolic panel (BMP + Alb, Alk Phos, ALT, AST, Total. Bili, TP); Future  - CBC with platelets; Future  - Comprehensive metabolic panel (BMP + Alb, Alk Phos, ALT, AST, Total. Bili, TP)  - CBC with platelets    Peripheral vascular disease, unspecified (H)  Prior fem-fem bypass s/p EVAR 4/6/2021  - Comprehensive metabolic panel (BMP + Alb, Alk Phos, ALT, AST, Total. Bili, TP); Future  - CBC with platelets; Future  - Comprehensive metabolic panel (BMP + Alb, Alk Phos, ALT, AST, Total. Bili, TP)  - CBC with platelets    Stage 3 chronic kidney disease, unspecified whether  stage 3a or 3b CKD (H)     - Comprehensive metabolic panel (BMP + Alb, Alk Phos, ALT, AST, Total. Bili, TP); Future  - CBC with platelets; Future  - Comprehensive metabolic panel (BMP + Alb, Alk Phos, ALT, AST, Total. Bili, TP)  - CBC with platelets    Squamous Cell Cancer of lung - treated in 2014 radiation therapy           Risks and Recommendations:  The patient has the following additional risks and recommendations for perioperative complications:  Cardiovascular:   - Cardiology consulted see note dated 6/14/2023  Anemia/Bleeding/Clotting:    - History of DVT or PE, consider DVT prevention postoperatively    Antiplatelet or Anticoagulation Medication Instructions:   - aspirin: Patient is at increased risk of thrombosis (e.g. stenting within the last year), continue aspirin without modification. Consider consultation with cardiology.    - clopidrogel (Plavix), prasugrel (Effient), ticagrelor (Brilinta): No contraindication to stopping Plavix, HOLD 5-7 days before surgery.     Additional Medication Instructions:   - ACE/ARB: HOLD on day of surgery (minimum 11 hours for general anesthesia).   - Beta Blockers: Continue taking on the day of surgery.   - Diuretics: May continue due to heart failure.   - Statins: Continue taking on the day of surgery.    -Isosorbide - continue taking day of surgery   - DMARDs   Sirolimus - continue medication     - Intraoperative stress dose steroids may be indicated due to chronic steroid use in the last 3 months (e.g. > 3 weeks of prednisone 20 mg or daily prednisone 5 mg).      See Cardiology note dated 6/14/2023 regarding cardiac history   Additional recommendations:  - Plan for 1-2 weeks Bactrim antibiotic suppression after surgery     RECOMMENDATION:    Recommended           Subjective       HPI related to upcoming procedure: enlargement of right common femoral artery pseudoaneurysm        6/21/2023    10:08 AM   Preop Questions   1. Have you ever had a heart attack or stroke?  YES -  CAD, inferior STEMI s/p RCA stent (4/7/21), HFrEF (30-35%), PAD, AAA with prior fem-fem bypass s/p EVAR (4/6/21),    2. Have you ever had surgery on your heart or blood vessels, such as a stent placement, a coronary artery bypass, or surgery on an artery in your head, neck, heart, or legs? YES - stent   3. Do you have chest pain with activity? No   4. Do you have a history of  heart failure? YES - see cardiology note 6/14/23   5. Do you currently have a cold, bronchitis or symptoms of other infection? No   6. Do you have a cough, shortness of breath, or wheezing? YES -  Stable    7. Do you or anyone in your family have previous history of blood clots? YES - history of DVT   8. Do you or does anyone in your family have a serious bleeding problem such as prolonged bleeding following surgeries or cuts? No   9. Have you ever had problems with anemia or been told to take iron pills? YES - chronic anemia   10. Have you had any abnormal blood loss such as black, tarry or bloody stools, or abnormal vaginal bleeding? No   11. Have you ever had a blood transfusion? YES - HAD CLINICALLY SIGNIFICANT ANTIBODY AGAINST RBC ANTIGEN   12. Are you willing to have a blood transfusion if it is medically needed before, during, or after your surgery? Yes   13. Have you or any of your relatives ever had problems with anesthesia? No   14. Do you have sleep apnea, excessive snoring or daytime drowsiness? No   15. Do you have any artifical heart valves or other implanted medical devices like a pacemaker, defibrillator, or continuous glucose monitor? No   16. Do you have artificial joints? No   17. Are you allergic to latex? No       Health Care Directive:  Patient does not have a Health Care Directive or Living Will: Discussed advance care planning with patient; however, patient declined at this time.    Preoperative Review of :   reviewed - no record of controlled substances prescribed.       Status of Chronic Conditions:  See  problem list for active medical problems.  Problems all longstanding and stable, except as noted/documented.  See ROS for pertinent symptoms related to these conditions.      Review of Systems  CONSTITUTIONAL: NEGATIVE for fever, chills, change in weight  INTEGUMENTARY/SKIN: NEGATIVE for worrisome rashes, moles or lesions  EYES: NEGATIVE for vision changes or irritation  ENT/MOUTH: throat irritation   RESP:chronic dyspnea  CV: leg and foot swelling at times   GI: gerd/globus sensation  : NEGATIVE for frequency, dysuria, or hematuria  MUSCULOSKELETAL: NEGATIVE for significant arthralgias or myalgia  NEURO: b.l leg weakness  ENDOCRINE: NEGATIVE for temperature intolerance, skin/hair changes  HEME: NEGATIVE for bleeding problems  PSYCHIATRIC: NEGATIVE for changes in mood or affect    Patient Active Problem List    Diagnosis Date Noted     Squamous cell lung cancer (H)      Priority: High     Following with oncology   Had radiation therapy - 3 sessions  F/u CT completed  PET scheduled 6/16/14       Anemia, iron deficiency 06/30/2022     Priority: Medium     Mild protein-calorie malnutrition (H) 05/03/2022     Priority: Medium     Coronary artery disease involving native coronary artery with angina pectoris with documented spasm (H) 06/25/2021     Priority: Medium     Occurred during hospitalization for AAA repair   Had RCA stent placed and started on Brillenta       Congestive heart failure, unspecified HF chronicity, unspecified heart failure type (H) 06/22/2021     Priority: Medium     Physical deconditioning 05/02/2021     Priority: Medium     Hematoma 04/23/2021     Priority: Medium     Abdominal aortic aneurysm (AAA) without rupture (H) 03/26/2021     Priority: Medium     Added automatically from request for surgery [id number]       Chronic kidney disease, stage 3 (H) 03/21/2021     Priority: Medium     Acute deep vein thrombosis (DVT) of proximal vein of lower extremity, unspecified laterality (H) 05/29/2020      Priority: Medium     Dx 3/2020  Rx for Eliquis but financial issues currently so on Lovenox.  Plan to restart when she can afford  Stopped the Plavix but continuing her Pletal       Age-related osteoporosis without current pathological fracture 06/22/2019     Priority: Medium     Aftercare following organ transplant 05/05/2018     Priority: Medium     Malignant neoplasm of anal canal (H) 04/09/2018     Priority: Medium     Cherry angioma 06/12/2016     Priority: Medium     Skin cancer screening 06/12/2016     Priority: Medium     Intertrigo 06/12/2016     Priority: Medium     History of basal cell carcinoma 06/12/2016     Priority: Medium     Alopecia 10/27/2015     Priority: Medium     Vaginal dysplasia 04/02/2015     Priority: Medium     Lumbago 10/16/2014     Priority: Medium     Peripheral artery disease (H) 01/16/2014     Priority: Medium     YUNIOR III (vulvar intraepithelial neoplasia III) 09/03/2013     Priority: Medium     History of basal cell carcinoma 05/24/2013     Priority: Medium     Immunosuppression (H)      Priority: Medium     HTN, kidney transplant related      Priority: Medium     CARDIOVASCULAR SCREENING; LDL GOAL LESS THAN 100 10/31/2010     Priority: Medium     S/P LEEP of cervix 03/11/2008     Priority: Medium     1/07: + HPV 6 Low risk  10/07: ASCUS, + HPV 56, 54 & 6. 12/07: [place]: TAL II  3/11/08: LEEP - TAL II  8/08: ASCUS, ECC atypia, +HPV 82  9/08: [place] - Atypia  5/09: NIL pap, 11/09: NIL pap, neg HPV  4/13: LSIL, + HPV 33 or 45, 61. 5/15/13: [place] - VAIN II & III,TAL II. Referred to gyn onc.   6/20/13: [place] with gyn onc. Plan LEEP and CO2 laser to vagina, cx and vaginal vault. Reminder done  7/17/13: Anal Microscopy, EUA vagina,Colposcopy Of Vagina And Vulva, Vaginal Biopsies, Omniguide Co2 Laser To Vagina and vulva, Loop Electrosurgical Excision Procedure To Cervix- YUNIOR 1,2 & 3: AIN 2, VAIN 2, Leep bx benign Plan colp at gyn onc in 4 months. Reminder sent.  12/2/13: ASC H, + HPV 33/45,  HSIL anal pap, YUNIOR 1 & 2, AIN 1 & 3. F/u deferred due to lung ca radiation  5/8/14: ASCUS, + HPV 33/45. 5/22/14: consult with Dr. Renteria, surgery planned in reminders  7/15/14: surgery-Exam under anesthesia, vulva and vaginal colposcopy, vulvar biopsy, CO2 laser of the vulva, vaginal pap smear - Pap LSIL, + HPV 33/45. Vulvar bx HSIL Plan repeat colp and pap with NP at gyn onc.  9/17/15: [place] with gyn onc. Due for repeat colp 3 -4 months. Tracking started.  5/26/16: Pap with vaginal Bx--Atyp/YUNIOR 1. Plan: Endometrial Bx  6/28/16: EMB, ECC--TAL 3. Plan: LEEP  8/17/16: LEEP--TAL 1, YUNIOR 1. Plan: Pap and colposcopy 6mo, due 2/17/17.  06/01/17 Pap--NIL/+HR HPV. [place]--negative. Plan: repeat colp and pap in 6mo.  12/14/17 [place]--visually normal, no Bx. Plan: colp & pap in 6mo.       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     Priority: Medium      Past Medical History:   Diagnosis Date     Abnormal coagulation profile     p [id number] heterozygote      Age-related osteoporosis without current pathological fracture 06/22/2019     Anemia      Antiplatelet or antithrombotic long-term use      ASCUS with positive high risk HPV 2007, 2015    + HPV 56, 54,& 6, colp - TAL III, Leep =TAL II     Basal cell carcinoma      Congestive heart failure, unspecified HF chronicity, unspecified heart failure type (H) 06/22/2021     COPD (chronic obstructive pulmonary disease) (H)      Depressive disorder 07/2015     Heart attack (H)      History of blood transfusion      Hypertension      Immunosuppressed status (H)     due meds     Kidney replaced by transplant 09/2004    Living donor recipient,  Rejection 7/2005     LSIL (low grade squamous intraepithelial lesion) on Pap smear 04/2013    +HPV 33 or 45, 61       PAD (peripheral artery disease) (H)      PONV (postoperative nausea and vomiting)      Squamous cell lung cancer (H)       Thrombosis of leg 1967     Unspecified disorder of kidney and ureter     X-linked dominant Alport's syndrome.     Past Surgical History:   Procedure Laterality Date     BIOPSY      Kidney, Lung, Breast     BIOPSY ANAL N/A 03/14/2018    Procedure: BIOPSY ANAL;;  Surgeon: Shabbir Leo MD;  Location: [facility]     BYPASS GRAFT FEMORAL FEMORAL Bilateral 04/25/2021    Procedure: Axplantation infected graft, femoral to femoral bypass with cadaveric artery, bilateral SATORIUS MUSCLE FLAP CREATION, evacuation of absece, vacuum closure;  Surgeon: Raven Lewis MD;  Location: [facility]     COLONOSCOPY       COLONOSCOPY N/A 08/09/2017    Procedure: COMBINED COLONOSCOPY, SINGLE OR MULTIPLE BIOPSY/POLYPECTOMY BY BIOPSY;;  Surgeon: Sushil Hyatt MD;  Location: [facility] GI     COLONOSCOPY N/A 7/28/2022    Procedure: COLONOSCOPY, WITH BIOPSY;  Surgeon: Moise Corcoran MD;  Location:  GI     COLPOSCOPY,LOOP ELECTRD CERVIX EXCIS  03/11/2008    TAL II     CONIZATION LEEP  07/17/2013    Procedure: CONIZATION LEEP;;  Surgeon: Liliana Renteria MD;  [facility]     CONIZATION LEEP N/A 08/17/2016    Procedure: CONIZATION LEEP;  Surgeon: Liliana Renteria MD;  [facility]     CV CORONARY ANGIOGRAM N/A 04/06/2021    Procedure: CV CORONARY ANGIOGRAM;  Surgeon: Sincere Rosas MD;  Location:  [facility]     CV CORONARY ANGIOGRAM N/A 04/25/2021    Procedure: Coronary Angiogram;  Surgeon: Sincere Rosas MD;  Location:  [facility]     CV PCI STENT DRUG ELUTING N/A 04/06/2021    Procedure: Percutaneous Coronary Intervention Stent Drug Eluting;  Surgeon: Sincere Rosas MD;  Location:  [facility]     ENDOVASCULAR REPAIR ANEURYSM AORTOILIAC Bilateral 04/06/2021    Procedure: Endovascular Abdominal Aortic Aneurysm Repair with Aortouniiliac Device and Femoral-Femoral Artery Bypass with [id number] Artegraft;  Surgeon: Abena Ferrara MD;   Location: [facility]     ESOPHAGOSCOPY, GASTROSCOPY, DUODENOSCOPY (EGD), COMBINED N/A 2/2/2023    Procedure: ESOPHAGOGASTRODUODENOSCOPY, WITH BIOPSY;  Surgeon: Yazan Heredia MD;  Location:  GI     EXAM UNDER ANESTHESIA ANUS  07/15/2014    Procedure: EXAM UNDER ANESTHESIA ANUS;  Surgeon: Radha Musa MD;  [facility]     EXAM UNDER ANESTHESIA ANUS N/A 03/14/2018    Procedure: EXAM UNDER ANESTHESIA ANUS;  Anal Exam Under Anesthesia With Excision of anal lesion, proctoscopy;  Surgeon: Shabbir Leo MD;  [facility]: [facility]     EYE SURGERY       GENITOURINARY SURGERY       IR OR ANGIOGRAM  04/06/2021     IRRIGATION AND DEBRIDEMENT GROIN Right 04/24/2021    Procedure: IRRIGATION AND DEBRIDEMENT, INGUINAL REGION, I & D PF RIGHT GROIN;  Surgeon: Raven Lewis MD;  [facility]     IRRIGATION AND DEBRIDEMENT GROIN N/A 04/26/2021    Procedure: IRRIGATION AND DEBRIDEMENT, INGUINAL REGION, PLACEMENT OF VERAFLO WOUND VAC, WOUND WASHOUT,;  Surgeon: Raven Lewis MD;  [facility]: [facility]     LASER CO2 EXCISE VULVA WIDE LOCAL  07/15/2014    Procedure: LASER CO2 EXCISE VULVA WIDE LOCAL;  Surgeon: Liliana Renteria MD;  [facility]     LASER CO2 VAGINA  07/17/2013    Procedure: LASER CO2 VAGINA;;  Surgeon: Liliana Renteria MD;  [facility]: [facility]     LASER CO2 VAGINA N/A 09/25/2018    Procedure: LASER CO2 VAGINA;  Exam Under Anesthesia, CO2 Laser Ablation of Upper Vagina and Cervix;  Surgeon: Pati Garcia MD;  [facility]: [facility]     MICROSCOPY ANAL  07/17/2013    Procedure: MICROSCOPY ANAL;  Anal Microscopy,  EUA vagina,Colposcopy Of Vagina And Vulva, Vaginal Biopsies, Omniguide Co2 Laser To Vagina and vulva, Loop Electrosurgical Excision Procedure To Cervix;  Surgeon: Radha Musa MD;  [facility]: [facility]     MICROSCOPY ANAL  07/15/2014    Procedure: MICROSCOPY ANAL;  Surgeon: Radha Musa MD;  Location: [facility]     PICC DOUBLE LUMEN PLACEMENT Right 04/30/2021    39cm, Basilic  vein     TRANSESOPHAGEAL ECHOCARDIOGRAM INTRAOPERATIVE N/A 04/28/2021    Procedure: ECHOCARDIOGRAM, TRANSESOPHAGEAL, INTRAOPERATIVE;  Surgeon: GENERIC ANESTHESIA PROVIDER;  Location: [facility]     VASCULAR SURGERY      Thrombectomy     [facility] NONSPECIFIC PROCEDURE      Thrombectomy     [facility] NONSPECIFIC PROCEDURE  1955 and 1959    Bilater eye surgery - correction for crossed eyes     [facility] NONSPECIFIC PROCEDURE  1998    oopherectomy L     [facility] NONSPECIFIC PROCEDURE  1967    open kidney biopsy - L     [facility] TRANSPLANTATION OF KIDNEY  09/2004    recipient -- [facility]      Current Outpatient Medications   Medication Sig Dispense Refill     acetaminophen (TYLENOL) 325 MG tablet Take 2 tablets (650 mg) by mouth every 4 hours as needed for other (For optimal non-opioid multimodal pain management to improve pain control.) 100 tablet 3     albuterol (PROAIR HFA/PROVENTIL HFA/VENTOLIN HFA) 108 (90 Base) MCG/ACT inhaler Inhale 1-2 puffs into the lungs every 6 hours as needed for shortness of breath, wheezing or cough 18 g 4     aspirin (ASA) 81 MG chewable tablet Take 1 tablet (81 mg) by mouth daily (Patient taking differently: Take 81 mg by mouth every morning) 90 tablet 3     atorvastatin (LIPITOR) 80 MG tablet Take 1 tablet (80 mg) by mouth daily 90 tablet 3     calcium carbonate-vitamin D (CALTRATE) 600-10 MG-MCG per tablet TAKE ONE TABLET BY MOUTH TWICE A  tablet 0     clopidogrel (PLAVIX) 75 MG tablet Take 1 tablet (75 mg) by mouth daily 90 tablet 3     esomeprazole (NEXIUM) 20 MG DR capsule Take 1 capsule (20 mg) by mouth every morning (before breakfast) Take 30-60 minutes before eating. 30 capsule 5     fluticasone (FLONASE) 50 MCG/ACT nasal spray Spray 1 spray into both nostrils daily (Patient taking differently: Spray 1 spray into both nostrils every morning) 18.2 mL 3     hydrALAZINE (APRESOLINE) 10 MG tablet Take 1 tablet (10 mg) by mouth 3 times daily 270 tablet 3     isosorbide mononitrate (IMDUR) 60 MG 24 hr  tablet Take 1 tablet (60 mg) by mouth daily 90 tablet 2     Lactobacillus-Inulin ([facility] DIGESTIVE HEALTH) CAPS TAKE ONE CAPSULE BY MOUTH ONCE DAILY (DUE FOR PHYSICAL IN MARCH) 90 capsule 3     lisinopril (ZESTRIL) 2.5 MG tablet Take 1 tablet (2.5 mg) by mouth daily (Patient taking differently: Take 2.5 mg by mouth every morning) 90 tablet 3     metoprolol tartrate (LOPRESSOR) 100 MG tablet Take 1 tablet (100 mg) by mouth daily 90 tablet 3     Nutritional Supplements (ENSURE CLEAR) LIQD Take 1 Bottle by mouth daily 396 mL 11     predniSONE (DELTASONE) 5 MG tablet Take 1 tablet (5 mg) by mouth daily 90 tablet 3     psyllium (METAMUCIL/KONSYL) 58.6 % powder Take by mouth every morning       sirolimus (GENERIC EQUIVALENT) 2 MG tablet Take 1 tablet (2 mg) by mouth daily 30 tablet 11     sirolimus (GENERIC EQUIVALENT) 2 MG tablet Take by mouth daily       torsemide (DEMADEX) 10 MG tablet Take 1 tablet (10 mg) by mouth daily Take an additional 10 MG every other day as needed. Additional use as directed by [facility]. 135 tablet 1       Allergies   Allergen Reactions     Blood Transfusion Related (Informational Only) Other (See Comments)     Patient has a history of a clinically significant antibody against RBC antigens.  A delay in compatible RBCs may occur.     Tramadol-Acetaminophen Nausea and Vomiting and Hives     Hydrocodone Nausea and Vomiting and Hives        Social History     Tobacco Use     Smoking status: Some Days     Packs/day: 0.30     Years: 35.00     Pack years: 10.50     Types: Cigarettes     Start date: 1967     Last attempt to quit: 3/1/2021     Years since quittin.3     Smokeless tobacco: Never     Tobacco comments:     States smokes once in a while   Substance Use Topics     Alcohol use: Not Currently     Comment: rarely       History   Drug Use Unknown         Objective     BP 96/68 (BP Location: Left arm, Patient Position: Sitting, Cuff Size: Adult Regular)   Pulse 58   Temp 97.2  F  "(36.2  C) (Temporal)   Resp 16   Ht 1.575 m (5' 2\")   Wt 39.9 kg (88 lb)   SpO2 94%   BMI 16.10 kg/m      Physical Exam    GENERAL APPEARANCE: alert and underweight     EYES: EOMI, PERRL     HENT: ear canals and TM's normal and nose and mouth without ulcers or lesions     NECK: no adenopathy, no asymmetry, masses, or scars and thyroid normal to palpation     RESP: lungs clear to auscultation - no rales, rhonchi or wheezes and decreased breath sounds throughout     CV: RRR no murmur appreciated     ABDOMEN:  soft, nontender, no HSM or masses and bowel sounds normal     MS: extremities normal- no gross deformities noted, no evidence of inflammation in joints, FROM in all extremities.     SKIN: no suspicious lesions or rashes     NEURO: Normal strength and tone, sensory exam grossly normal, mentation intact and speech normal     PSYCH: mentation appears normal. and affect normal/bright     LYMPHATICS: No cervical adenopathy    Recent Labs   Lab Test 06/06/23  1540 04/06/23  1639 03/24/23  1000 03/08/23  1323 03/02/23  1158   HGB  --  10.3* 11.2*   < > 12.0   PLT  --  257 290   < > 295   INR  --   --   --   --  1.03   NA  --  139 139   < > 137  137   POTASSIUM  --  4.0 3.5   < > 3.9  3.9   CR 2.2* 1.74* 1.79*   < > 2.41*  2.41*    < > = values in this interval not displayed.        Diagnostics:  Labs pending at this time.  Results will be reviewed when available.  Recent Results (from the past 24 hour(s))   CBC with platelets    Collection Time: 06/21/23 11:33 AM   Result Value Ref Range    WBC Count 7.3 4.0 - 11.0 10e3/uL    RBC Count 2.89 (L) 3.80 - 5.20 10e6/uL    Hemoglobin 8.3 (L) 11.7 - 15.7 g/dL    Hematocrit 27.1 (L) 35.0 - 47.0 %    MCV 94 78 - 100 fL    MCH 28.7 26.5 - 33.0 pg    MCHC 30.6 (L) 31.5 - 36.5 g/dL    RDW 17.8 (H) 10.0 - 15.0 %    Platelet Count 254 150 - 450 10e3/uL      EKG: appears normal, NSR, unchanged from previous tracings    Revised Cardiac Risk Index (RCRI):  The patient has the " following serious cardiovascular risks for perioperative complications:   - High risk surgery (>5% cardiac complication risk) = 1 point   - Congestive Heart Failure (pulmonary edema, PND, s3 janice, CXR with pulmonary congestion, basilar rales) = 1 point     RCRI Interpretation: 2 points: Class III (moderate risk - 6.6% complication rate)     Estimated Functional Capacity: see cardiology note           Signed Electronically by: Lisa Martinez DO  Copy of this evaluation report is provided to requesting physician.

## 2023-06-21 NOTE — PATIENT INSTRUCTIONS
For informational purposes only. Not to replace the advice of your health care provider. Copyright   2003,  [facility]. All rights reserved. Clinically reviewed by Lucinda García MD. SAK Project [id number] - REV .  Preparing for Your Surgery  Getting started  A nurse will call you to review your health history and instructions. They will give you an arrival time based on your scheduled surgery time. Please be ready to share:  Your doctor's clinic name and phone number  Your medical, surgical, and anesthesia history  A list of allergies and sensitivities  A list of medicines, including herbal treatments and over-the-counter drugs  Whether the patient has a legal guardian (ask how to send us the papers in advance)  Please tell us if you're pregnant--or if there's any chance you might be pregnant. Some surgeries may injure a fetus (unborn baby), so they require a pregnancy test. Surgeries that are safe for a fetus don't always need a test, and you can choose whether to have one.   If you have a child who's having surgery, please ask for a copy of Preparing for Your Child's Surgery.    Preparing for surgery  Within 10 to 30 days of surgery: Have a pre-op exam (sometimes called an H&P, or History and Physical). This can be done at a clinic or pre-operative center.  If you're having a , you may not need this exam. Talk to your care team.  At your pre-op exam, talk to your care team about all medicines you take. If you need to stop any medicines before surgery, ask when to start taking them again.  We do this for your safety. Many medicines can make you bleed too much during surgery. Some change how well surgery (anesthesia) drugs work.  Call your insurance company to let them know you're having surgery. (If you don't have insurance, call [phone].)  Call your clinic if there's any change in your health. This includes signs of a cold or flu (sore throat, runny nose, cough, rash, fever). It  also includes a scrape or scratch near the surgery site.  If you have questions on the day of surgery, call your hospital or surgery center.  Eating and drinking guidelines  For your safety: Unless your surgeon tells you otherwise, follow the guidelines below.  Eat and drink as usual until 8 hours before you arrive for surgery. After that, no food or milk.  Drink clear liquids until 2 hours before you arrive. These are liquids you can see through, like water, Gatorade, and Propel Water. They also include plain black coffee and tea (no cream or milk), candy, and breath mints. You can spit out gum when you arrive.  If you drink alcohol: Stop drinking it the night before surgery.  If your care team tells you to take medicine on the morning of surgery, it's okay to take it with a sip of water.  Preventing infection  Shower or bathe the night before and morning of your surgery. Follow the instructions your clinic gave you. (If no instructions, use regular soap.)  Don't shave or clip hair near your surgery site. We'll remove the hair if needed.  Don't smoke or vape the morning of surgery. You may chew nicotine gum up to 2 hours before surgery. A nicotine patch is okay.  Note: Some surgeries require you to completely quit smoking and nicotine. Check with your surgeon.  Your care team will make every effort to keep you safe from infection. We will:  Clean our hands often with soap and water (or an alcohol-based hand rub).  Clean the skin at your surgery site with a special soap that kills germs.  Give you a special gown to keep you warm. (Cold raises the risk of infection.)  Wear special hair covers, masks, gowns and gloves during surgery.  Give antibiotic medicine, if prescribed. Not all surgeries need antibiotics.  What to bring on the day of surgery  Photo ID and insurance card  Copy of your health care directive, if you have one  Glasses and hearing aids (bring cases)  You can't wear contacts during surgery  Inhaler and  eye drops, if you use them (tell us about these when you arrive)  CPAP machine or breathing device, if you use them  A few personal items, if spending the night  If you have . . .  A pacemaker, ICD (cardiac defibrillator) or other implant: Bring the ID card.  An implanted stimulator: Bring the remote control.  A legal guardian: Bring a copy of the certified (court-stamped) guardianship papers.  Please remove any jewelry, including body piercings. Leave jewelry and other valuables at home.  If you're going home the day of surgery  You must have a responsible adult drive you home. They should stay with you overnight as well.  If you don't have someone to stay with you, and you aren't safe to go home alone, we may keep you overnight. Insurance often won't pay for this.  After surgery  If it's hard to control your pain or you need more pain medicine, please call your surgeon's office.  Questions?   If you have any questions for your care team, list them here: _________________________________________________________________________________________________________________________________________________________________________ ____________________________________ ____________________________________ ____________________________________    How to Take Your Medication Before Surgery  - HOLD (do not take) ACE the AM of surgery   Hold plavix for 5-7 days prior to surgery

## 2023-06-22 NOTE — TELEPHONE ENCOUNTER
Creatinine = 2.0  (6/21/23)  Baseline 1.3-1.6        PLAN:  Any nausea / vomiting / diarrhea?  Dehydrated?   Is BP low?   Any dizziness or light headedness when standing or walking?  If dehydrated, and unable to hydrate orally, can schedule IV fluids and repeat BMP after.  Recommend improving hydration and recheck BMP in 1 - 2 weeks.              OUTCOME:  Left Vm for Maribel.  [facility] message sent to Dr. Ferrara re: Maribel currently on Sirolimus.

## 2023-06-22 NOTE — TELEPHONE ENCOUNTER
Mayi, MD Ivana Santos Teresa, RN  Maybe try tacrolimus, as we could guess dosing from her sirolimus.     Would start with tacrolimus 1.5 mg bid with goal level 4-6, closer to 4.     Dhruv       ----- Message -----   From: Lisseth Heath, AVERY   Sent: 6/22/2023   2:51 PM CDT   To: Hitesh See MD   Subject: Surgery, Rapa                                     Dr See,     Patient on Sirolimus 2 mg daily   She is scheduled for REPAIR, PSEUDOANEURYSM, ARTERY, FEMORAL with cadaveric femoral-popliteal artery -  on Tuesday     Current IS:   Sirolimus (3-5)   Pred 5     She got really sick last time she was on MPA and had to go to ED.   She has multiple cancers (lung / anal)     What should we switch her to?     Thanks,   Torey      PLAN:  HOLD Sirolimus  Start Tacrolimus 1.5 mg BID, 12 hour trough goal at 4-6  Weekly labs    OUTCOME:  Updated med list.  Tac prescription sent.  Left  for Maribel.

## 2023-06-22 NOTE — PROGRESS NOTES
Social Work - Intervention  [facility]  Data/Intervention:    Patient Name: Maribel Yang  /Age: 1950 (72 year old)     Visit Type: telephone  Referral Source: Vascular  Reason for Referral: Transportation needed to lab appointment on Monday in preparation for surgery Tuesday    Collaborated With:    -Maribel- [phone]  -AVERY Mejia  - colleague Chandni Naik and Kailee Nicole     Psychosocial Information/Concerns:  Message received from AVERY Mejia reporting that Maribel needs to get to lab on Monday for labs that are necessary for her surgery on Tuesday, but Maribel has no transportation options.  is asked to assist.      Intervention/Education/Resources Provided:  I contacted Maribel first on her cell number and left a vm introducing myself and the reason for my call and asked for her to call back.   I then tried the home number listed ([phone]) but the number is out of service.     I updated Jackie and asked that a lab appointment be scheduled for Monday afternoon so there is as much time as possible to figure out transportation.     I updated  colleagues Chandni Naik and Kailee Nicole.      Assessment/Plan:  I will await a return call from Maribel and provide assistance at that time.      update: I had previously received notice that SALVADOR Naik sent Maribel a My Chart message offering assistance regarding transportation. Maribel replied to this message indicating that her daughter will be transporting her to her Monday appointment.      Provided patient/family with contact information and availability.    EVITA Kent, [facility]    [facility]  Ph: [phone], Pgr: [phone]  2023

## 2023-06-22 NOTE — TELEPHONE ENCOUNTER
Abena Ferrara MD [place], Teresa, RN  Cc: Jackie Lee, AVERY; Vy Leung APRN CNP  That d be great if you could do that please. Thanks, Abena       ----- Message -----   From: Lisseth Heath RN   Sent: 6/22/2023   2:11 PM CDT   To: Abena Ferrara MD   Subject: RE: Sirolimus                                     Ok good. Just wanted to be sure you were aware.   Yes in general for invasive surgeries we switch to another immunosuppression since it delays wound healing. Once healed post op we switch back.   Thanks.     ----- Message -----   From: Abena Ferrara MD   Sent: 6/22/2023   1:25 PM CDT   To: Lisseth Heath RN   Subject: RE: Sirolimus                                     Thanks for reaching out! We haven't routinely stopped it. Does she typically for other surgeries? Thanks, Abena   ----- Message -----   From: Lisseth Heath RN   Sent: 6/22/2023  12:41 PM CDT   To: Abena Ferrara MD   Subject: Sirolimus                                         Dr. Ferrara,     I see there is a surgery scheduled for next week.   Are you okay patient is on Sirolimus?   Just wanted to make sure.     Thanks,   Torey Heath   Post Kidney / Pancreas Transplant Coordinator   [facility]   T: [phone]         Message sent to Dr. See for recs re: switching off Sirolimus for upcoming surgery  6/27/23  Procedure     REPAIR, PSEUDOANEURYSM, ARTERY, FEMORAL with cadaveric femoral-popliteal artery

## 2023-06-23 NOTE — TELEPHONE ENCOUNTER
Pt calling stating that her tongue is swollen. It was really swollen last night. She feels that it has gone down significantly. Pt has not tried to eat or drink anything yet this AM.     She did not eat, drink, or take any new medication yesterday that she believes would cause a allergic reaction.    Pt has not taken any benadryl. States it is improving on its own. Does not have any other symptoms.    Please advise, thanks    Nina Swanson RN  [facility]

## 2023-06-23 NOTE — PROGRESS NOTES
Social Work - Transportation  [facility]    Data/Intervention:  Patient Name: Maribel Yang  /Age: 1950 (72 year old)    Referral From: Jackie Lee, RN    Reason for Referral:  support requested for patient transportation needs for labs.    Sw was consulted to connect with patient regarding transportation for labs. Writer attempted to contact patient today, , left a message providing writer contact information. Also in message provided her with information for [facility]. Gave number to schedule ride, also informed of cost of $5/each way.    Encouraged her to return writer call if in need of assistance or if this wouldn't work out. Sw will await return call and provide assistance at that time.      EVITA Koch, [facility]    [facility]  [phone]  [email or web address]

## 2023-06-23 NOTE — TELEPHONE ENCOUNTER
Left message for patient to call [facility] back  When patient calls back please transfer to an RN  Wilda ASHTON RN

## 2023-06-23 NOTE — TELEPHONE ENCOUNTER
Called back Maribel Yang   Discussed immunosuppression switch  Verbalized understanding and agreement to plan.

## 2023-06-23 NOTE — TELEPHONE ENCOUNTER
Not clear what could be contributing     If swelling not resolving or improving - I would like to have her HOLD the lisinopril for now  If worsens she should go to ER   Thanks  PN

## 2023-06-26 NOTE — PROGRESS NOTES
Social Work - Follow-Up  [facility]    Data/Intervention:    Patient Name: Maribel Yang  /Age: 1950 (72 year old)    Reason for Follow-Up:  Transportation    Collaborated With:    -Maribel- [phone]    Intervention/Education/Resources Provided:  Received My Chart message from Maribel reporting her ride fell through for today and she asked if it is too late to arrange a ride.     I spoke with Maribel and she reported that her daughter forgot about her appointment and let her grandson use the car to go to work, and this is the vehicle that would be used to get to her appointment. Maribel reported there is no one else who can bring her to her appointment today. Maribel also reported she wouldn't be able to afford cab fare to get to and from the clinic. I explained that this one time the clinic can cover the cost of a cab ride and Maribel was very thankful for this and said she doesn't usually have this problem.   I confirmed  location details with Maribel and she reported there will not be anyone else coming with her, and she noted she uses a walker.     I arranged a cab through [employer] for Maribel, with  at her home at 1:30pm, and the cab company will call Maribel's cell when they are getting close.     I attempted to call Maribel with this information but the call would not connect.     I sent Maribel a My Chart message with the transportation details, booking number, and the phone number ([phone]) to call for her ride home. I asked that Maribel let me know when she gets this information.     Assessment/Plan:  No follow up is planned but I will remain available if assistance is needed.     Previously provided patient/family with writer's contact information and availability.      EVITA Kent, [facility]    [facility]  Ph: [phone], Pgr: [phone]  2023

## 2023-06-26 NOTE — ANESTHESIA PREPROCEDURE EVALUATION
Anesthesia Pre-Procedure Evaluation    Patient: Maribel Ynag   MRN: [id number] : 1950        Procedure : Procedure(s):  REPAIR, PSEUDOANEURYSM, ARTERY, FEMORAL with cadaveric femoral-popliteal artery          Past Medical History:   Diagnosis Date     Abnormal coagulation profile     p [id number] heterozygote      Age-related osteoporosis without current pathological fracture 2019     Anemia      Antiplatelet or antithrombotic long-term use      ASCUS with positive high risk HPV ,     + HPV 56, 54,& 6, colp - TAL III, Leep =TAL II     Basal cell carcinoma      Congestive heart failure, unspecified HF chronicity, unspecified heart failure type (H) 2021     COPD (chronic obstructive pulmonary disease) (H)      Depressive disorder 2015     Heart attack (H)      History of blood transfusion      Hypertension      Immunosuppressed status (H)     due meds     Kidney replaced by transplant 2004    Living donor recipient,  Rejection 2005     LSIL (low grade squamous intraepithelial lesion) on Pap smear 2013    +HPV 33 or 45, 61       PAD (peripheral artery disease) (H)      PONV (postoperative nausea and vomiting)      Squamous cell lung cancer (H)      Thrombosis of leg 1967     Unspecified disorder of kidney and ureter     X-linked dominant Alport's syndrome.      Past Surgical History:   Procedure Laterality Date     BIOPSY      Kidney, Lung, Breast     BIOPSY ANAL N/A 2018    Procedure: BIOPSY ANAL;;  Surgeon: Shabbir Leo MD;  [facility]: [facility]     BYPASS GRAFT FEMORAL FEMORAL Bilateral 2021    Procedure: Axplantation infected graft, femoral to femoral bypass with cadaveric artery, bilateral SATORIUS MUSCLE FLAP CREATION, evacuation of absece, vacuum closure;  Surgeon: Raven Lewis MD;  Location: [facility]     COLONOSCOPY       COLONOSCOPY N/A 2017    Procedure: COMBINED COLONOSCOPY, SINGLE OR MULTIPLE BIOPSY/POLYPECTOMY BY BIOPSY;;  Surgeon: Sushil Hyatt  MD Luis;  Location:  GI     COLONOSCOPY N/A 7/28/2022    Procedure: COLONOSCOPY, WITH BIOPSY;  Surgeon: Moise Corcoran MD;  Location:  GI     COLPOSCOPY,LOOP ELECTRD CERVIX EXCIS  03/11/2008    TAL II     CONIZATION LEEP  07/17/2013    Procedure: CONIZATION LEEP;;  Surgeon: Liliana Renteria MD;  [facility]     CONIZATION LEEP N/A 08/17/2016    Procedure: CONIZATION LEEP;  Surgeon: Liliana Renteria MD;  [facility]     CV CORONARY ANGIOGRAM N/A 04/06/2021    Procedure: CV CORONARY ANGIOGRAM;  Surgeon: Sincere Rosas MD;  Location:  [facility]     CV CORONARY ANGIOGRAM N/A 04/25/2021    Procedure: Coronary Angiogram;  Surgeon: Sincere Rosas MD;  Location:  [facility]     CV PCI STENT DRUG ELUTING N/A 04/06/2021    Procedure: Percutaneous Coronary Intervention Stent Drug Eluting;  Surgeon: Sincere Rosas MD;  Location:  [facility]     ENDOVASCULAR REPAIR ANEURYSM AORTOILIAC Bilateral 04/06/2021    Procedure: Endovascular Abdominal Aortic Aneurysm Repair with Aortouniiliac Device and Femoral-Femoral Artery Bypass with [id number] Artegraft;  Surgeon: Abena Ferrara MD;  Location:  OR     ESOPHAGOSCOPY, GASTROSCOPY, DUODENOSCOPY (EGD), COMBINED N/A 2/2/2023    Procedure: ESOPHAGOGASTRODUODENOSCOPY, WITH BIOPSY;  Surgeon: Yazan Heredia MD;  Location:  GI     EXAM UNDER ANESTHESIA ANUS  07/15/2014    Procedure: EXAM UNDER ANESTHESIA ANUS;  Surgeon: Radha Musa MD;  Location:  OR     EXAM UNDER ANESTHESIA ANUS N/A 03/14/2018    Procedure: EXAM UNDER ANESTHESIA ANUS;  Anal Exam Under Anesthesia With Excision of anal lesion, proctoscopy;  Surgeon: Shabbir Leo MD;  Location:  OR     EYE SURGERY       GENITOURINARY SURGERY       IR OR ANGIOGRAM  04/06/2021     IRRIGATION AND DEBRIDEMENT GROIN Right 04/24/2021    Procedure: IRRIGATION AND DEBRIDEMENT, INGUINAL REGION, I &  D PF RIGHT GROIN;  Surgeon: Raven Lewis MD;  [facility]     IRRIGATION AND DEBRIDEMENT GROIN N/A 04/26/2021    Procedure: IRRIGATION AND DEBRIDEMENT, INGUINAL REGION, PLACEMENT OF VERAFLO WOUND VAC, WOUND WASHOUT,;  Surgeon: Raven Lewis MD;  [facility]     LASER CO2 EXCISE VULVA WIDE LOCAL  07/15/2014    Procedure: LASER CO2 EXCISE VULVA WIDE LOCAL;  Surgeon: Liliana Renteria MD;  [facility]     LASER CO2 VAGINA  07/17/2013    Procedure: LASER CO2 VAGINA;;  Surgeon: Liliana Renteria MD;  [facility]     LASER CO2 VAGINA N/A 09/25/2018    Procedure: LASER CO2 VAGINA;  Exam Under Anesthesia, CO2 Laser Ablation of Upper Vagina and Cervix;  Surgeon: Pati Garcia MD;  [facility]: [facility]     MICROSCOPY ANAL  07/17/2013    Procedure: MICROSCOPY ANAL;  Anal Microscopy,  EUA vagina,Colposcopy Of Vagina And Vulva, Vaginal Biopsies, Omniguide Co2 Laser To Vagina and vulva, Loop Electrosurgical Excision Procedure To Cervix;  Surgeon: Radha Musa MD;  Location: [facility]     MICROSCOPY ANAL  07/15/2014    Procedure: MICROSCOPY ANAL;  Surgeon: Radha Musa MD;  [facility]: [facility]     PICC DOUBLE LUMEN PLACEMENT Right 04/30/2021    39cm, Basilic vein     TRANSESOPHAGEAL ECHOCARDIOGRAM INTRAOPERATIVE N/A 04/28/2021    Procedure: ECHOCARDIOGRAM, TRANSESOPHAGEAL, INTRAOPERATIVE;  Surgeon: GENERIC ANESTHESIA PROVIDER;  Location: [facility]     VASCULAR SURGERY      Thrombectomy     [facility] NONSPECIFIC PROCEDURE      Thrombectomy     [facility] NONSPECIFIC PROCEDURE  1955 and 1959    Bilater eye surgery - correction for crossed eyes     [facility] NONSPECIFIC PROCEDURE  1998    oopherectomy L     [facility] NONSPECIFIC PROCEDURE  1967    open kidney biopsy - L     [facility] TRANSPLANTATION OF KIDNEY  09/2004    recipient -- done at [facility]      Allergies   Allergen Reactions     Blood Transfusion Related (Informational Only) Other (See Comments)     Patient has a history of a clinically significant antibody against RBC  antigens.  A delay in compatible RBCs may occur.     Tramadol-Acetaminophen Nausea and Vomiting and Hives     Hydrocodone Nausea and Vomiting and Hives      Social History     Tobacco Use     Smoking status: Some Days     Packs/day: 0.30     Years: 35.00     Pack years: 10.50     Types: Cigarettes     Start date: 1967     Last attempt to quit: 3/1/2021     Years since quittin.3     Smokeless tobacco: Never     Tobacco comments:     States smokes once in a while   Substance Use Topics     Alcohol use: Not Currently     Comment: rarely      Wt Readings from Last 1 Encounters:   23 39.9 kg (88 lb)        Anesthesia Evaluation   Pt has had prior anesthetic.     No history of anesthetic complications       ROS/MED HX  ENT/Pulmonary:     (+) tobacco use, Past use, COPD,  (-) sleep apnea   Neurologic:    (-) no CVA   Cardiovascular:     (+) Dyslipidemia hypertension-Peripheral Vascular Disease-- Other. CAD -past MI --Taking blood thinners CHF Previous cardiac testing   Echo: Date: 2022 Results:  Interpretation Summary  Global and regional left ventricular function is normal with an EF of 55-60%.  Small focal area of wall thinning and akinesis in the mid septum.  Global right ventricular function is normal.  No significant valvular abnormalities present.  IVC diameter <2.1 cm collapsing >50% with sniff suggests a normal RA pressure  of 3 mmHg.  Small loculated pericardial effusion adjacent to the RV.  Abdominal aortic aneurysm measuring 4.5 cm with intra-mural thrombus.  Compared to prior, LV fxn has improved. No other change.     Stress Test: Date: Results:    ECG Reviewed: Date: 23 Results:  NSR  Cath:  Date: 21 Results:  -Additional 90 mg of PO ticagrelor given in CCL  -Can stop heparin  -Continuous IV nitroglycerin  -Stop BB  -Consider adding calcium channel blocker for coronary spasm cautiously given negative ionotropic effect especially with nondihydropyridine CCBs  -Dual antiplatelet  treatment with low-dose aspirin daily and 90 mg ticagrelor twice daily as scheduled for recently placed LIUDMILA and recent STEMI.  -Low-dose aspirin po daily lifelong.  -Bedrest per protocol/usual post procedure care with TR band  -High intensity statin for CAD   -ACE inhibitor/ARB, and aldosterone blockade for CAD/STEMI ischemic cardiomyopathy   -Continued medical management including lifestyle modifications for CV risk factor optimizations  -Cardiac rehabilitation         METS/Exercise Tolerance:     Hematologic:     (+) History of blood clots, pt is anticoagulated, anemia, history of blood transfusion, Known PRBC Anitbodies: Yes, - multiple antibodies,     Musculoskeletal:       GI/Hepatic:    (-) GERD   Renal/Genitourinary:     (+) renal disease, type: CRI, Pt has history of transplant,     Endo:    (-) Type II DM   Psychiatric/Substance Use:     (+) psychiatric history depression     Infectious Disease: Comment: Infected fem-fem bypass      Malignancy:       Other:            Physical Exam    Airway        Mallampati: II   TM distance: > 3 FB   Neck ROM: full   Mouth opening: > 3 cm    Respiratory Devices and Support         Dental           Cardiovascular   cardiovascular exam normal          Pulmonary   pulmonary exam normal                OUTSIDE LABS:  CBC:   Lab Results   Component Value Date    WBC 7.3 06/21/2023    WBC 8.7 04/06/2023    HGB 8.3 (L) 06/21/2023    HGB 10.3 (L) 04/06/2023    HCT 27.1 (L) 06/21/2023    HCT 32.8 (L) 04/06/2023     06/21/2023     04/06/2023     BMP:   Lab Results   Component Value Date     06/21/2023     04/06/2023    POTASSIUM 4.1 06/21/2023    POTASSIUM 4.0 04/06/2023    CHLORIDE 105 06/21/2023    CHLORIDE 101 04/06/2023    CO2 26 06/21/2023    CO2 26 04/06/2023    BUN 40.1 (H) 06/21/2023    BUN 39.7 (H) 04/06/2023    CR 2.00 (H) 06/21/2023    CR 2.2 (H) 06/06/2023    GLC 94 06/21/2023     (H) 04/06/2023     COAGS:   Lab Results   Component Value  Date    PTT 25 03/02/2023    INR 1.03 03/02/2023     POC:   Lab Results   Component Value Date     (H) 04/26/2021    HCG Negative 09/22/2008     HEPATIC:   Lab Results   Component Value Date    ALBUMIN 4.0 06/21/2023    PROTTOTAL 6.7 06/21/2023    ALT 20 06/21/2023    AST 24 06/21/2023    GGT 76 (H) 06/23/2021    ALKPHOS 97 06/21/2023    BILITOTAL <0.2 06/21/2023    BILIDIRECT .0@ 03/10/2005     OTHER:   Lab Results   Component Value Date    PH 7.30 (L) 04/25/2021    LACT 1.8 03/02/2023    A1C 5.6 04/08/2021    KAYKAY 9.9 06/21/2023    PHOS 2.8 04/28/2021    MAG 2.0 06/24/2021    LIPASE 25 03/02/2023    TSH 1.57 03/02/2023    CRP 14.0 (H) 06/24/2021    SED 20 06/07/2021       Anesthesia Plan    ASA Status:  3   NPO Status:  NPO Appropriate    Anesthesia Type: General.     - Airway: ETT   Induction: Intravenous.   Maintenance: Balanced.   Techniques and Equipment:     - Lines/Monitors: 2nd IV, Arterial Line     Consents    Anesthesia Plan(s) and associated risks, benefits, and realistic alternatives discussed. Questions answered and patient/representative(s) expressed understanding.    - Discussed:     - Discussed with:  Patient         Postoperative Care    Pain management: Multi-modal analgesia, IV analgesics, Oral pain medications.   PONV prophylaxis: Ondansetron (or other 5HT-3), Dexamethasone or Solumedrol     Comments:                    Delia Bourne MD

## 2023-06-27 PROBLEM — I72.4 PSEUDOANEURYSM OF FEMORAL ARTERY (H): Status: ACTIVE | Noted: 2023-01-01

## 2023-06-27 NOTE — OP NOTE
Date: June 27, 2023    Pre-operative Diagnosis:  5 cm right femoral pseudoaneurysm     Post-operative Diagnosis: Same     Procedure(s):  1.  Right femoral to profunda femoris artery bypass with cadaveric cryo femoropopliteal artery 2.  Repair of right femoral pseudoaneurysm     Surgeon: Abena Ferrara MD     Assistant: Edison Ashley MD     Anesthesia: General     Indications: This 72 year old female with a history of kidney transplant had a complex endovascular aneurysm repair with an aorto uniiliac configuration and cross femoral graft with Artegraft.  At approximately 3 to 4 weeks postop her right groin wound became infected and the Artegraft was replaced with a cadaveric graft.  She was lost to follow-up and came back now approximately 2 and half years after the procedure with a large 5 cm right femoral artery pseudoaneurysm and highly stenotic anastomosis from the femoral-femoral bypass.  I recommended repair of the pseudoaneurysm and a bypass around this area initially given the extreme scarring and prior muscle flap that had been placed over the right femoral artery at the time of the cadaveric femorofemoral graft.  She understood the risks and benefits and wished to proceed.       Findings: 5 cm right femoral artery pseudoaneurysm     Complications: None     Estimated Blood Loss:   200 mL     Drains: None     Specimens: None        Procedure Details: The patient was brought to the operating placed in supine position.  After general anesthesia was achieved and a timeout performed the right groin and upper thigh were prepped and draped in sterile fashion.  Attention was first turned to a longitudinal incision in the upper thigh where the profunda femoris artery had been identified.  This was dissected free proximally and distally with all branches looped with Vesseloops.  Next we made a transverse incision in the suprainguinal region where the femorofemoral had been tunneled.  We entered into the retroperitoneal  space and identified the bypass and dissected free from the surrounding structures.  A tunnel was created from the femorofemoral graft down to the profunda femoris artery.  The patient was systemically heparinized with 5000's of IV heparin.  The proximal anastomosis was constructed in end-to-side fashion using 5-0 Prolene suture then passed through the previously created tunnel.  The distal anastomosis was constructed to the profunda femoris artery in an end to side fashion.  Prior to completion anastomosis antegrade and retrograde flushing was performed.  There is excellent Doppler signal distally.  At this point we aspirated approximately 40 cc of old blood out of the pseudoaneurysm to decompress this.  We had ligated with 2-0 silk suture and hemoclips proximal to our profunda femoris anastomosis and distal to our femoral anastomosis to occlude both inflow and outflow to the pseudoaneurysm.  We did know there was 1 additional branch which needed to be ligated.  Once the majority of blood was aspirated from the pseudoaneurysm we opened it from the incision used to dissect the profunda femoris artery.  Once inside the sac we found a backbleeding branch which was oversewn with a 2-0 silk suture.  The old pseudoaneurysm sac was then irrigated and when no additional bleeding was not encountered a 2-0 silk suture was used to decompress and tacked down the sac.  At this point 30 cc of quarter percent Marcaine with epinephrine was injected for analgesia in the suprainguinal and the thigh incisions.         The incisions were then closed in layers with running 3.0 Vicryl and 4.0 monocril followed by Dermabond.  Glue Tegaderms were applied to both incisions.  A Doppler dorsalis pedis and posterior tibial signal were identified on the right foot.  The patient appeared to have tolerated the procedure well without immediate complication. Sponge, needle and instrument count was reported as correct at the end of the case. I was  present throughout the entire portion of the procedure.              Abena Ferrara MD, DFSVS, RPVI  Director, [facility]  Chief, Vascular and Endovascular Surgery  [facility]  Pager: 1. Send message or 10 digit call back number Securely via [employer] with the Vocera Web Console (learn more here)              2. Outside of [facility]? Call [phone]

## 2023-06-27 NOTE — ANESTHESIA PROCEDURE NOTES
Arterial Line Procedure Note    Pre-Procedure   Staff -        Anesthesiologist:  Jeffry Starkey MD       Resident/Fellow: Delia Bourne MD       Performed By: resident and with residents       Procedure performed by resident/fellow/CRNA in presence of a teaching physician.         Location: OR       Pre-Anesthestic Checklist: patient identified, IV checked, risks and benefits discussed, informed consent, monitors and equipment checked, pre-op evaluation and at physician/surgeon's request  Timeout:       Correct Patient: Yes        Correct Procedure: Yes        Correct Site: Yes        Correct Position: Yes   Line Placement:   This line was placed Post Induction  Procedure   Procedure: arterial line       Laterality: left       Insertion Site: radial.  Sterile Prep        Standard elements of sterile barrier followed       Skin prep: Chloraprep  Insertion/Injection        Technique: Seldinger Technique        Catheter Type/Size: 20 G, 12 cm  Narrative         Secured by: suture       Tegaderm dressing used.       Complications: None apparent,        Arterial waveform: Yes        IBP within 10% of NIBP: Yes

## 2023-06-27 NOTE — PROGRESS NOTES
"Post Op Check    06/27/2023    Maribel Yang is a 72 year old female with h/o HTN, HLD, PVD, CAD, STEMI, HFrEF (30-35%), COPD, SCC of R lung s/p radiation, anal canal carcinoma s/p chemoradiation, ESRD s/p LDKT (2014), stage III CKD, h/o DVT, with vascular surgical history notable for: AAA s/p aorto-uni iliac with R to L fem fem with artegraft(4/6/21), infection of previous fem fem thus with redo fem fem with cryo artery and sartorius flap, who unfortunately developed a pseudoaneurysm of R fem to CFA anastomosis which grew 2cm->4.6cm, and she is now POD0 S/p R femoral to profunda bypass with cadaveric interposition graft, with ligation of R femoral pseudoaneurysm.     Pt reports she is doing well. Reports she is a little nauseous, but otherwise feeling fine. Has not vomited. Due to nausea has not had anything besides sips of water, but nausea is improving and she states she will try a little more later.  No pain at all.  Denies SOB, chest pain, or dizziness, lightheadedness. Has urinated since surgery, has not yet gotten out of bed.    /70 (BP Location: Left arm)   Pulse 82   Temp 97.2  F (36.2  C) (Core)   Resp 16   Ht 1.549 m (5' 1\")   Wt 39.3 kg (86 lb 10.3 oz)   SpO2 97%   BMI 16.37 kg/m      Gen: A&O x3, NAD  Chest: breathing non-labored on NC  Incision: clean, dry, intact.   Extremities: warm and well perfused. R groin pseudoaneurysm remains decompressed. Palpable pulse in bypass graft in R groin. Multiphasic signals in R DP and PT.     A/P: No acute post-op issues. Please call with any questions.  - Bedrest with up to commode for this evening  - Keep head of bed 30 degrees  - Encourage IS   - OK for ADAT   - Will continue gentle IVF 75cc/hr given lack of PO intake, ok to TKO when patient taking PO  - SQH to start this evening  - ASA in am  - Continue ancef periop abx for 24 hours     Edison Ashley MD  Surgery resident      "

## 2023-06-27 NOTE — ANESTHESIA POSTPROCEDURE EVALUATION
Patient: Maribel Yang    Procedure: Procedure(s):  RIGHT FEMORAL TO PROFUNDA FEMORIS ARTERY BYPASS WITH cadaveric femoral-popliteal artery, REPAIR OF RIGHT GROIN PSEUDOANEURYSM       Anesthesia Type:  General    Note:  Disposition: Inpatient   Postop Pain Control: Uneventful            Sign Out: Well controlled pain   PONV: No   Neuro/Psych: Uneventful            Sign Out: Acceptable/Baseline neuro status   Airway/Respiratory: Uneventful            Sign Out: Acceptable/Baseline resp. status   CV/Hemodynamics: Uneventful            Sign Out: Acceptable CV status; No obvious hypovolemia; No obvious fluid overload   Other NRE: NONE   DID A NON-ROUTINE EVENT OCCUR? No           Last vitals:  Vitals Value Taken Time   /82 06/27/23 1321   Temp 36  C (96.8  F) 06/27/23 1300   Pulse 77 06/27/23 1335   Resp 16 06/27/23 1330   SpO2 98 % 06/27/23 1335   Vitals shown include unvalidated device data.    Electronically Signed By: Jeffry Starkey MD  June 27, 2023  1:35 PM

## 2023-06-27 NOTE — PLAN OF CARE
Transfer  Transferred from: PACU  Via: bed  Reason for transfer: Pt appropriate for 6B- improved patient condition  Family: Aware of transfer  Belongings: Received with pt  Chart: Received with pt  Medications: Meds received from old unit with pt  Code Status verified on armband: yes  2 RN Skin Assessment Completed By: Carla Polanco rec completed: yes  Bed surface reassessed with algorithm and charted: yes  New bed surface ordered:  no  Suction/Ambu bag/Flowmeter at bedside: yes    Report received from: PACU nurse    Pt status:  Neuro: A&Ox4. Lethargic, calls appropriately. Afebrile.   Cardiac: No tele ordered. VSS. Denies chest pain. Dopplered bilateral dorsalis pulses.   Respiratory: Sating >95% on 2 L NC, PIERCE and shortness of breath at baseline.  GI/: Adequate urine output, up to bedside commode, no BM during shift.   Diet/appetite: Tolerating clear liquid diet. Intermittent nausea, PRN Zofran given x1 with adequate relief.   Activity:  Assist of SB, bedrest with bedside commode.   Pain: At acceptable level on current regimen.   Skin: No new deficits noted. Right groin site x2 dermabonded, CDI.  LDA's: Right and left PIV x1 with LR infusing at 75 mL/hr.     Plan: Continue with POC. Notify primary team with changes.

## 2023-06-27 NOTE — ANESTHESIA PROCEDURE NOTES
Airway       Patient location during procedure: OR       Procedure Start/Stop Times: 6/27/2023 8:15 AM  Staff -        Anesthesiologist:  Jeffry Starkey MD       Resident/Fellow: Delia Bourne MD       Performed By: with residents       Procedure performed by resident/fellow/CRNA in presence of a teaching physician.    Consent for Airway        Urgency: elective  Indications and Patient Condition       Indications for airway management: keri-procedural       Induction type:intravenous       Mask difficulty assessment: 1 - vent by mask    Final Airway Details       Final airway type: endotracheal airway       Successful airway: ETT - single and Oral  Endotracheal Airway Details        ETT size (mm): 6.5       Cuffed: yes       Successful intubation technique: direct laryngoscopy       DL Blade Type: Hernandez 2       Grade View of Cords: 1       Adjucts: stylet       Position: Left       Measured from: lips       Secured at (cm): 22       Bite block used: None    Post intubation assessment        Placement verified by: capnometry, equal breath sounds and chest rise        Number of attempts at approach: 1       Number of other approaches attempted: 0       Secured with: pink tape       Ease of procedure: easy       Dentition: Intact    Medication(s) Administered   Medication Administration Time: 6/27/2023 8:15 AM    Additional Comments       Routine intubation.

## 2023-06-27 NOTE — ANESTHESIA CARE TRANSFER NOTE
Patient: Maribel Yang    Procedure: Procedure(s):  RIGHT FEMORAL TO PROFUNDA FEMOROUS ARTERY BYPASS WITH cadaveric femoral-popliteal artery, REPAIR OF RIGHT GROIN PSEUDOANEURYSM       Diagnosis: Pseudoaneurysm of right femoral artery (H) [I72.4]  Diagnosis Additional Information: No value filed.    Anesthesia Type:   General     Note:    Oropharynx: oropharynx clear of all foreign objects and spontaneously breathing  Level of Consciousness: awake  Oxygen Supplementation: face mask  Level of Supplemental Oxygen (L/min / FiO2): 6  Independent Airway: airway patency satisfactory and stable  Dentition: dentition unchanged  Vital Signs Stable: post-procedure vital signs reviewed and stable  Report to RN Given: handoff report given  Patient transferred to: PACU    Handoff Report: Identifed the Patient, Identified the Reponsible Provider, Reviewed the pertinent medical history, Discussed the surgical course, Reviewed Intra-OP anesthesia mangement and issues during anesthesia, Set expectations for post-procedure period and Allowed opportunity for questions and acknowledgement of understanding      Vitals:  Vitals Value Taken Time   BP     Temp     Pulse     Resp     SpO2         Electronically Signed By: Delia Bourne MD  June 27, 2023  12:53 PM

## 2023-06-28 NOTE — PROGRESS NOTES
"VASCULAR SURGERY PROGRESS NOTE    Subjective:  Resting comfortably In bed, feeling well this morning, pain tolerable.     Objective:  Intake/Output Summary (Last 24 hours) at 6/28/2023 0746  Last data filed at 6/28/2023 0400  Gross per 24 hour   Intake 2767 ml   Output 900 ml   Net 1867 ml     Labs:  ROUTINE IP LABS (Last four results)  BMP  Recent Labs   Lab 06/28/23  0529 06/27/23  1226 06/27/23  1201 06/27/23  1144 06/27/23  0701 06/27/23  0628 06/26/23  1425 06/21/23  1133    140 140 138  --  140 138 143   POTASSIUM 4.0 4.5 3.7 4.4  --  3.9 4.1 4.1   CHLORIDE 107  --   --   --   --  105 100 105   KAYKAY 8.5*  --   --   --   --  10.1 9.8 9.9   CO2 24  --   --   --   --  23 25 26   BUN 29.8*  --   --   --   --  45.9* 38.8* 40.1*   CR 1.80*  --   --   --   --  2.07* 1.87* 2.00*   * 120* 109* 120*   < > 107* 115* 94    < > = values in this interval not displayed.     CBC  Recent Labs   Lab 06/28/23  0529 06/27/23  1607 06/27/23  1321 06/27/23  1226 06/27/23  1201 06/27/23  1144 06/27/23  0628 06/26/23  1425 06/21/23  1133   WBC 6.4  --   --   --   --   --  5.5 6.6 7.3   RBC 3.21*  --   --   --   --   --  3.31* 3.17* 2.89*   HGB 9.4*  --  9.2* 9.2* 7.4*   < > 9.3* 9.0* 8.3*   HCT 30.1*  --   --   --   --   --  31.5* 30.0* 27.1*   MCV 94  --   --   --   --   --  95 95 94   MCH 29.3  --   --   --   --   --  28.1 28.4 28.7   MCHC 31.2*  --   --   --   --   --  29.5* 30.0* 30.6*   RDW 15.8*  --   --   --   --   --  17.0* 17.0* 17.8*    203  --   --   --   --  272 289 254    < > = values in this interval not displayed.     INRNo lab results found in last 7 days.  PHYSICAL EXAM:  /83 (BP Location: Left arm)   Pulse 90   Temp 99  F (37.2  C) (Oral)   Resp 18   Ht 1.549 m (5' 1\")   Wt 38.6 kg (85 lb 1.6 oz)   SpO2 94%   BMI 16.08 kg/m    General: The patient is alert and oriented. Appropriate. No acute distress  Psych: pleasant affect, answers questions appropriately  Skin: Color appropriate for " race, warm, dry.  Respiratory: The patient does not require supplemental oxygen. Breathing unlabored  GI:  Abdomen soft, nontender to light palpation.  Incision: Right groin incision C/D/I with tegaderm on, pseudoaneurysm remains decompressed, appropriate mild bruising, no hematoma  Extremities: doppler signal over DP and PT on right lower extremity, palpable pulse in bypass graft on right groin. Extremity warm.              ASSESSMENT / PLAN:  Maribel Yang is a 72 year old female with h/o HTN, HLD, PVD, CAD, STEMI, HFrEF (30-35%), COPD, SCC of R lung s/p radiation, anal canal carcinoma s/p chemoradiation, ESRD s/p LDKT (2014), stage III CKD, h/o DVT, with vascular surgical history notable for: AAA s/p aorto-uni iliac with R to L fem fem with artegraft(4/6/21), infection of previous fem fem thus with redo fem fem with cryo artery and sartorius flap, who unfortunately developed a pseudoaneurysm of R fem to CFA anastomosis which grew 2cm->4.6cm, and she is now POD1 s/p R femoral to profunda bypass with cadaveric interposition graft, with ligation of R femoral pseudoaneurysm.     - Activity: OOB to chair, increase activity as tolerated  - Regular diet  - Stop IVF  - Encourage IS  - ASA, statin and resuming plavix this morning   - Keflex x2 weeks (will be discharged on this)  - Multimodal pain management  - Please notify vascular surgery immediately should any new symptoms or problems occur.     Discussed pt history, exam, assessment and plan with Dr. Ferrara of the vascular surgery service, who is in agreement with the above.    Vy Leung, [facility]  Vascular Surgery  Pager: [phone]  [email or web address]  Send message or 10 digit call back number Securely via [facility] with the Vocera Web Console (learn more here)      Addendum: Of note, patient's hgb prior to surgery was 8.3 and she experienced some blood loss in the OR, please OR note for details. Subsequently, patient received PRBCs while in the OR  administered by anesthesia (see anesthesia records from yesterday 6/27/23). No further treatments were needed, her Hgb has remained stable.     Vy Leung, SHANT  Vascular Surgery

## 2023-06-28 NOTE — DISCHARGE INSTRUCTIONS
Discharge Instructions    Diet  - You may return to your regular diet. We always encourage taking fiber as well as staying well-hydrated.   - Be sure to drink plenty of fluids.     Activity  - No lifting, pushing, or pulling greater than 10 pounds and no strenuous exercise for 4-6 weeks.   - We encourage throat lozenges or cough drops if you develop a cough.  - No driving while on narcotic pain medications (such as oxycodone)  - No driving until you are able to fully twist to both sides quickly and without any pain    Wound Care  - Inspect your wounds daily for signs of infection (increased redness, drainage, pain)  - Keep your wound clean and dry, inspect it daily for the above signs.  - Your incision was closed with skin glue, this will fall off on it's own. You also have a protective film on top of the skin glue which was applied before it has dried in the operating room. This film and the skin glue should remain in place for at least 2 weeks. Do not remove this yourself until advised to do so.   - You may shower 24 hours after your surgery, but do not soak incisions in tubs, pools, lakes for at least 14 days post-surgery. When showering, please do not scrub your incisions or the skin glue - let soapy water run over them, pat to dry.    Call Vascular surgery [facility] for:  - Temperature > 101F, chills, rigors, dizziness  - Redness around or purulent drainage from wound  - Inability to tolerate diet, nausea or vomiting  - You stop passing gas, develop significant bloating, abdominal pain  - Have blood in stools/vomit  - Have severe diarrhea/constipation  - Any other questions or concerns.    Medication Instructions  Some of your medications may have changed. Please take only prescribed and resumed medications.     Please note, you were prescribed Keflex to take for 14 days starting 6/28/23, an antibiotic to prevent infection. Please take with food.    As per our discussion, you were not prescribed any stool softeners  as you are already have them at home.  Please contact our office via numberFiret or at [phone] should you need additional stool softeners.    We recommend you take Tylenol around the clock for baseline pain control. Then take narcotic pain medication only as needed if you were prescribed any. Once you are no longer needing narcotics, you may take the Tylenol as needed. Do not take more than 4,000 mg of acetaminophen (Tylenol) from all sources to reduce the risk of liver damage.     Follow up:    PCP:  Follow up with PCP in 5-7 days for post-hospitalization follow up. You may call to set this up - most clinics will be able to get you an appointment within the week if you let them know you were recently hospitalized and need to see your PCP.     Vascular:  Future Appointments 6/28/2023 - 12/25/2023        Date Visit Type Length Department Provider     7/10/2023 12:30 PM RETURN VASCULAR PATIENT 30 min [facility] VASCULAR SURGERY Vy Leung APRN CNP    Location Instructions:     Located in the Clinics and Surgery Center at [address] [place] [address]. For parking options, enter the [facility] /arrival plaza from [facility] and attendants can assist you based on your needs.  parking is available for those with limited mobility M-F from 7 a.m. to 5 p.m. Due to short staffing, we are unable to offer  to all patients/visitors. Visit [email or web address] for more details.  Self-parking:&nbsp;  [place] Lot: Located across from the main entrance, this is a convenient option for patients. Enter on [facility]. Parking attendants available most hours to assist.&nbsp;     [facility] Ramp: Enter at the [facility] SE entrance (one block north of the [facility] main entrance). Do not enter the ramp from [facility] - this entrance is not staffed and is further from the [facility] main entrance.              7/17/2023 10:00 AM US LWR EXT ART DUP RIGHT 40 min [facility] ULTRASOUND [id number]    Location Instructions:     [facility]  [facility] [place] [address]  Parking Located in the [facility] at [address] [place] [address]. We're taking every precaution to prevent the spread of COVID-19. Our top priority is to protect and care for our patients, community members, and our staff. For that reason, we are suspending  services until further notice. Please self-park your vehicle before entering our facility. For [facility] [place] please use the [address] at [address] [place] [address].  Entrance and check-in location Enter through the main arrival plaza entrance doors. A  will greet you at the door to direct you to your appointment location. The Imaging Center is located on the first floor, to the left from the entrance. Please check-in at with the registration staff in the Imaging/Laboratory waiting area.              7/19/2023 11:30 AM RETURN VASCULAR PATIENT 30 min [facility] VASCULAR SURGERY Abena Ferrara MD    Location Instructions:     Located in the [facility] and [facility] Center at [address] [place] [address]. For parking options, enter the [facility] /arrival plaza from [facility] and attendants can assist you based on your needs.  parking is available for those with limited mobility M-F from 7 a.m. to 5 p.m. Due to short staffing, we are unable to offer  to all patients/visitors. Visit [email or web address] for more details.  Self-parking:&nbsp;  [address] Lot: Located across from the main entrance, this is a convenient option for patients. Enter on [facility]. Parking attendants available most hours to assist.&nbsp;     [address]: Enter at the [facility] entrance (one block north of the [facility] main entrance). Do not enter the ramp from [facility] - this entrance is not staffed and is further from the [facility] main entrance.                With general vascular surgery questions,  concerns, or to request/change an appointment, please call:      Vascular Call Center  [phone]    To contact someone after 5 pm, on a weekend, or on a Holiday, please call:  [facility]  [phone], option 4 to have the Attending Physician for Vascular Surgery Service paged.

## 2023-06-28 NOTE — PROGRESS NOTES
Vascular Surgery Post-Operative Check    Patient Name: Maribel Yang  Date of Service: 06/27/2023  Attending Surgeon: Abena Ferrara MD  Operation: RIGHT FEMORAL TO PROFUNDA FEMORIS ARTERY BYPASS WITH cadaveric femoral-popliteal artery, REPAIR OF RIGHT GROIN PSEUDOANEURYSM: 6/27/2023    Maribel Yang is a 72 year old female who has a past medical history of Abnormal coagulation profile, Age-related osteoporosis without current pathological fracture (06/22/2019), Anemia, Antiplatelet or antithrombotic long-term use, ASCUS with positive high risk HPV (2007, 2015), Basal cell carcinoma, Congestive heart failure, unspecified HF chronicity, unspecified heart failure type (H) (06/22/2021), COPD (chronic obstructive pulmonary disease) (H), Depressive disorder (07/2015), Heart attack (H), History of blood transfusion, Hypertension, Immunosuppressed status (H), Kidney replaced by transplant (09/2004), LSIL (low grade squamous intraepithelial lesion) on Pap smear (04/2013), PAD (peripheral artery disease) (H), PONV (postoperative nausea and vomiting), Squamous cell lung cancer (H), Thrombosis of leg (1967), and Unspecified disorder of kidney and ureter. She is now POD#0 s/p the procedure listed above.    Subjective: Maribel was seen at the bedside and reports pain is well controlled on current regimen.   - She does not have any shortness of breath, chest pain, or dizziness.   - She has had some sips of clears. Reports slight nausea, no vomiting.    Objective:  Vital Signs: Most Recent  Ranges (24 hours)   Temp: 97.8  F (36.6  C)  Pulse: 82  BP: 126/70  Resp: 16  SpO2: 97 % Temp  Min: 96.8  F (36  C)  Max: 97.8  F (36.6  C)  Pulse  Min: 71  Max: 86  BP  Min: 101/56  Max: 169/103  Resp  Min: 14  Max: 18  SpO2  Min: 94 %  Max: 99 %   O2 Device: Nasal cannula    Physical Exam  General:  NAD, A&Ox3 (person, place, year), Cooperative, Sleeping in bed  Chest:  NWOB on Nasal cannula  Abdomen:  Soft, Nontender, ND  Extremities:   MAEx4, PPP  -- UE: Radial pulses palpable BL  -- LE: Pulses present in DP and PT BL    Assessment/Plan:   No acute post-op issues. Please see the day team's progress notes and/or operative notes for detailed plans.    Davin Acharya MD   General Surgery Resident

## 2023-06-28 NOTE — PROVIDER NOTIFICATION
Time of notification: 4:55 AM  Provider notified: Vascular surgery overnight  Patient status: FYI: Patient is having some pain in R groin/procedure area. She declined pain meds at this time. CMS intact, pulses intact/unchaged from earlier assessments. Incision sites look a little pink, unchanged from earlier. No hematoma noted.    Orders received: Provider spoke with writer and said they will pass along the information to the day team. They said it is possible medication used at the end of the procedure could be wearing off now causing patient to notice the pain, but continue to monitor the site and notify provider of changes.

## 2023-06-28 NOTE — PROGRESS NOTES
Brief vascular surgery note update    Reassessed Ms Maribel Yang this afternoon and she is recovering from surgery extremely well. Patient's extremities are warm, appear to be well perfused, denies pain. Right groin incision is C/D/I, without hematoma, patient has been up walking in the room and feeling great. Ready to be discharged to home. Will be discharged this afternoon to home with her daughter with 14 days of Keflex. No prescription for Tylenol or stool softeners provided at patient's request, she already has these at home. RN updated. Dr. Ferrara updated. Vascular surgery will follow up with her via telephone tomorrow for post-discharge update. Ms. Yang is in agreement with plan.    Vy Leung CNP  Vascular Surgery  Pager: [phone]  [email or web address]  Send message or 10 digit call back number Securely via [employer] with the [employer] Web Console (learn more here)

## 2023-06-28 NOTE — CONSULTS
"Care Management Initial Consult    General Information  Assessment completed with: Patient, Daughter, Alexi  Type of CM/SW Visit: Initial Assessment  Primary Care Provider verified and updated as needed: Yes   Readmission within the last 30 days: no previous admission in last 30 days   Advance Care Planning: no concerns identified, spouse is NOK.     Communication Assessment  Patient's communication style: spoken language (English or Bilingual)    Hearing Difficulty or Deaf: no   Wear Glasses or Blind: no    Cognitive  Cognitive/Neuro/Behavioral: WDL    Living Environment:   People in home: spouse, Padilla  Current living Arrangements: house      Able to return to prior arrangements: yes    Family/Social Support:  Care provided by: self  Provides care for: no one  Marital Status:   Support System: , daughter  Description of Support System: Involved, Supportive       Current Resources:   Patient receiving home care services: No  Community Resources: None  Equipment currently used at home: walker, rolling  Supplies currently used at home: None    Employment/Financial:  Employment Status: retired     Financial Concerns: No concerns identified     Functional Status:  Prior to admission patient needed assistance:   Dependent ADLs: Independent  Dependent IADLs: Shopping, Transportation        Additional Information:  Care management assessment completed at the bedside d/t increased unplanned readmission risk score. Patient lives locally w/her . Patient uses a 4 wheel seated walker outside of the home, no walker use in the home. Patient does not have any current in home services, daughter confirms that she is the \"errand runner\" and is available for assistance as needed. Patient denies any discharge needs or concerns at this time, her daughter will provide transportation.     Care coordination will continue to follow for discharge planning.     Nathalie Sal, RNCC, BSN    [facility] " Health    [address]  [address] [place] [address]    [email or web address]  [facility]    Office: [phone] Pager: [phone]

## 2023-06-28 NOTE — PLAN OF CARE
Hours of Care: [id number]    Neuro: A&Ox4.   Cardiac: No tele ordered. HR 70-80's. SBP  100-130's. VSS. Afebrile  Respiratory: Sating >95% on RA. On capno since pt had a procedure yesterday afternoon (6/27)  GI/: Adequate urine output via commode. No BM during shift.  Diet/appetite: Tolerating clear liquid diet. Had nausea earlier during shift, IV compazine provided with relief.   Activity:  Pt on bedrest except for getting up to the commode. SBA up to commode.   Pain: At acceptable level on current regimen. Denied pain during most of shift. Pt c/o procedural site pain around 0400, provider notified, no changes noted to site, pulses and CMS intact. Pt declined pain meds at this time.   Skin: No new deficits noted. R groin site x2 dermabonded, WDL w/ CMS intact.   LDA's: R & L PIV, LR infusing at 75 mL/hr.    Plan: Will continue to monitor and follow POC. Notify primary team with changes.

## 2023-06-28 NOTE — PLAN OF CARE
DISCHARGE                         06/30125 at 16:10 ----------------------------------------------------------------------------  Discharged to: Home  Via: private transportation  Accompanied by: Family  Discharge Instructions: Regular diet, activity restrictions, medications, follow up appointments, when to call the MD, aftercare instructions.  Prescriptions: To be filled by [facility]; medication list reviewed & sent with pt  Follow Up Appointments: arranged; information given  Belongings: All sent with pt  IV: d/c'd  Telemetry: d/c'd  Pt exhibits understanding of above discharge instructions; all questions answered.    Discharge Paperwork: Signed, copied, and sent home with patient.

## 2023-06-28 NOTE — DISCHARGE SUMMARY
Vascular Surgery Discharge Summary    NAME: Maribel Yang   MRN: [id number]   : 1950     DATE OF ADMISSION: 2023     PRE/POSTOPERATIVE DIAGNOSES:    5 cm right femoral pseudoaneurysm    PROCEDURES PERFORMED, 2023:   1.  Right femoral to profunda femoris artery bypass with cadaveric cryo femoropopliteal artery   2.  Repair of right femoral pseudoaneurysm    INTRAOPERATIVE FINDINGS:  5 cm right femoral artery pseudoaneurysm    POSTOPERATIVE COMPLICATIONS: None     DATE OF DISCHARGE: 23    HOSPITAL COURSE: Maribel Yang is a 72 year old female who on 2023 underwent the above-named procedures. She tolerated the procedure well and postoperatively was transferred to the general post-surgical unit. The remainder of her course was essentiallly uncomplicated.  Prior to discharge, her pain was controlled well with tylenol and oxycodone. She was able to perform ADLs and ambulate independently without difficulty, and had full return of bowel and bladder function.  On 23, she was discharged to home with her daughter in stable condition.    Physical Exam:  Constitutional: healthy, alert, no acute distress and cooperative   Cardiovascular: pulses regular  Respiratory: breathing unlabored without secondary muscle use  Psychiatric: mentation appears normal and affect normal/bright  Neck: no asymmetry  GI/Abdomen: abdomen soft, non-tender. No palpable masses  MSK: able to move all extremities without weakness or ataxia  Extremities: no open lesions, extremities warm and well perfused  Skin: right groin incision C/D/I with tegaderm on, pseudoaneurysm remains decompressed, appropriate mild bruising, no hematoma  Pulses: doppler signal over DP and PT on right lower extremity, palpable pulse in bypass graft on right groin. Extremity warm.    DISCHARGE INSTRUCTIONS:    Diet  - You may return to your regular diet. We always encourage taking fiber as well as staying well-hydrated.   - Be sure to drink  plenty of fluids.     Activity  - No lifting, pushing, or pulling greater than 10 pounds and no strenuous exercise for 4-6 weeks.   - We encourage throat lozenges or cough drops if you develop a cough.  - No driving while on narcotic pain medications (such as oxycodone)  - No driving until you are able to fully twist to both sides quickly and without any pain    Wound Care  - Inspect your wounds daily for signs of infection (increased redness, drainage, pain)  - Keep your wound clean and dry, inspect it daily for the above signs.  - Your incision was closed with skin glue, this will fall off on it's own. You also have a protective film on top of the skin glue which was applied before it has dried in the operating room. This film and the skin glue should remain in place for at least 2 weeks. Do not remove this yourself until advised to do so.   - You may shower 24 hours after your surgery, but do not soak incisions in tubs, pools, lakes for at least 14 days post-surgery. When showering, please do not scrub your incisions or the skin glue - let soapy water run over them, pat to dry.    Call Vascular surgery [facility] for:  - Temperature > 101F, chills, rigors, dizziness  - Redness around or purulent drainage from wound  - Inability to tolerate diet, nausea or vomiting  - You stop passing gas, develop significant bloating, abdominal pain  - Have blood in stools/vomit  - Have severe diarrhea/constipation  - Any other questions or concerns.    Medication Instructions  Some of your medications may have changed. Please take only prescribed and resumed medications.     Please note, you were prescribed Keflex to take for 14 days starting 6/28/23, an antibiotic to prevent infection. Please take with food.    As per our discussion, you were not prescribed any stool softeners as you are already have them at home. Please contact our office via [employer] or at [phone] should you need additional stool softeners.    We recommend you  take Tylenol around the clock for baseline pain control. Then take narcotic pain medication only as needed if you were prescribed any. Once you are no longer needing narcotics, you may take the Tylenol as needed. Do not take more than 4,000 mg of acetaminophen (Tylenol) from all sources to reduce the risk of liver damage.     Follow up:    PCP:  Follow up with PCP in 5-7 days for post-hospitalization follow up. You may call to set this up - most clinics will be able to get you an appointment within the week if you let them know you were recently hospitalized and need to see your PCP.     Vascular:  Future Appointments 6/28/2023 - 12/25/2023        Date Visit Type Length Department Provider     7/10/2023 12:30 PM RETURN VASCULAR PATIENT 30 min [facility] VASCULAR SURGERY Vy Leung APRN CNP    Location Instructions:     Located in the Clinics and Surgery Center at [address] [place] [address]. For parking options, enter the [facility] /arrival plaza from [facility] and attendants can assist you based on your needs.  parking is available for those with limited mobility M-F from 7 a.m. to 5 p.m. Due to short staffing, we are unable to offer  to all patients/visitors. Visit [email or web address] for more details.  Self-parking:&nbsp;  West Lot: Located across from the main entrance, this is a convenient option for patients. Enter on [facility]. Parking attendants available most hours to assist.&nbsp;     [facility] Ramp: Enter at the [facility] entrance (one block north of the [facility] main entrance). Do not enter the ramp from [facility] - this entrance is not staffed and is further from the [facility] main entrance.              7/17/2023 10:00 AM US LWR EXT ART DUP RIGHT 40 min [facility] ULTRASOUND [id number]    Location Instructions:     [facility] [address] [place] [address]  Parking Located in the [facility] and Surgery Center at [address] [place] [address]  [place] [phone]. We're taking every precaution to prevent the spread of COVID-19. Our top priority is to protect and care for our patients, community members, and our staff. For that reason, we are suspending  services until further notice. Please self-park your vehicle before entering our facility. For [facility] [place] please use the [employer] Ramp at [address] [facility], [place] [address].  Entrance and check-in location Enter through the main arrival plaza entrance doors. A  will greet you at the door to direct you to your appointment location. The Imaging Center is located on the first floor, to the left from the entrance. Please check-in at with the registration staff in the Imaging/Laboratory waiting area.              7/19/2023 11:30 AM RETURN VASCULAR PATIENT 30 min [facility] VASCULAR SURGERY Abena Ferrara MD    Location Instructions:     Located in the [facility] and Surgery Center at [address] [address]. For parking options, enter the [facility]arrival plaza from [facility] and attendants can assist you based on your needs.  parking is available for those with limited mobility M-F from 7 a.m. to 5 p.m. Due to short staffing, we are unable to offer  to all patients/visitors. Visit [email or web address] for more details.  Self-parking:&nbsp;  West Lot: Located across from the main entrance, this is a convenient option for patients. Enter on [facility]. Parking attendants available most hours to assist.&nbsp;     [facility] Ramp: Enter at the [facility] entrance (one block north of the [facility] main entrance). Do not enter the ramp from [facility] - this entrance is not staffed and is further from the [facility] main entrance.                With general vascular surgery questions, concerns, or to request/change an appointment, please call:      Vascular Call Center  [phone]    To contact someone after 5 pm, on a weekend, or on a Holiday,  please call:  [facility]  [phone], option 4 to have the Attending Physician for Vascular Surgery Service paged.         DISCHARGE MEDICATIONS:     Review of your medicines      START taking      Dose / Directions   cephALEXin 250 MG capsule  Commonly known as: KEFLEX  Indication: Infection of the Skin and/or Soft Tissue      Dose: 250 mg  Take 1 capsule (250 mg) by mouth every 8 hours for 14 days  Quantity: 42 capsule  Refills: 0     oxyCODONE 5 MG tablet  Commonly known as: ROXICODONE      Dose: 5 mg  Take 1 tablet (5 mg) by mouth every 4 hours as needed for severe pain  Quantity: 12 tablet  Refills: 0        CONTINUE these medicines which may have CHANGED, or have new prescriptions. If we are uncertain of the size of tablets/capsules you have at home, strength may be listed as something that might have changed.      Dose / Directions   aspirin 81 MG chewable tablet  Commonly known as: ASA  This may have changed: when to take this  Used for: KIDNEY TRANSPLANT STATUS, Abdominal aortic aneurysm (AAA) without rupture (H), ST elevation myocardial infarction (STEMI), unspecified artery (H), Peripheral artery disease (H)      Dose: 81 mg  Take 1 tablet (81 mg) by mouth daily  Quantity: 90 tablet  Refills: 3     fluticasone 50 MCG/ACT nasal spray  Commonly known as: FLONASE  Indication: Allergic Rhinitis  This may have changed: when to take this  Used for: Chronic rhinitis      Dose: 1 spray  Spray 1 spray into both nostrils daily  Quantity: 18.2 mL  Refills: 3     lisinopril 2.5 MG tablet  Commonly known as: ZESTRIL  This may have changed: when to take this  Used for: Congestive heart failure, unspecified HF chronicity, unspecified heart failure type (H)      Dose: 2.5 mg  Take 1 tablet (2.5 mg) by mouth daily  Quantity: 90 tablet  Refills: 3        CONTINUE these medicines which have NOT CHANGED      Dose / Directions   acetaminophen 325 MG tablet  Commonly known as: TYLENOL  Used for:  Abdominal aortic aneurysm (AAA) without rupture (H), ST elevation myocardial infarction (STEMI), unspecified artery (H), Peripheral artery disease (H)      Dose: 650 mg  Take 2 tablets (650 mg) by mouth every 4 hours as needed for other (For optimal non-opioid multimodal pain management to improve pain control.)  Quantity: 100 tablet  Refills: 3     albuterol 108 (90 Base) MCG/ACT inhaler  Commonly known as: PROAIR HFA/PROVENTIL HFA/VENTOLIN HFA  Used for: Mild intermittent asthma without complication      Dose: 1-2 puff  Inhale 1-2 puffs into the lungs every 6 hours as needed for shortness of breath, wheezing or cough  Quantity: 18 g  Refills: 4     atorvastatin 80 MG tablet  Commonly known as: LIPITOR  Used for: Abdominal aortic aneurysm (AAA) without rupture (H), ST elevation myocardial infarction (STEMI), unspecified artery (H), Peripheral artery disease (H)      Dose: 80 mg  Take 1 tablet (80 mg) by mouth daily  Quantity: 90 tablet  Refills: 3     calcium carbonate-vitamin D 600-10 MG-MCG per tablet  Commonly known as: CALTRATE  Used for: Age-related osteoporosis without current pathological fracture      TAKE ONE TABLET BY MOUTH TWICE A DAY  Quantity: 120 tablet  Refills: 0     clopidogrel 75 MG tablet  Commonly known as: Plavix  Used for: Coronary artery vasospasm (H)      Dose: 75 mg  Take 1 tablet (75 mg) by mouth daily  Quantity: 90 tablet  Refills: 3     Culturelle Digestive Health Caps  Used for: Immunosuppression (H)      TAKE ONE CAPSULE BY MOUTH ONCE DAILY (DUE FOR PHYSICAL IN MARCH)  Quantity: 90 capsule  Refills: 3     Ensure Clear Liqd  Used for: Abdominal aortic aneurysm (AAA) without rupture (H), Congestive heart failure, unspecified HF chronicity, unspecified heart failure type (H), Immunosuppression (H), Protein-calorie malnutrition, unspecified severity (H)      Dose: 1 Bottle  Take 1 Bottle by mouth daily  Quantity: 396 mL  Refills: 11     esomeprazole 20 MG DR capsule  Commonly known as:  nexIUM  Used for: Globus syndrome      Dose: 20 mg  Take 1 capsule (20 mg) by mouth every morning (before breakfast) Take 30-60 minutes before eating.  Quantity: 30 capsule  Refills: 5     hydrALAZINE 10 MG tablet  Commonly known as: APRESOLINE  Used for: Congestive heart failure, unspecified HF chronicity, unspecified heart failure type (H), Essential hypertension      Dose: 10 mg  Take 1 tablet (10 mg) by mouth 3 times daily  Quantity: 270 tablet  Refills: 3     isosorbide mononitrate 60 MG 24 hr tablet  Commonly known as: IMDUR  Used for: Coronary artery vasospasm (H), Abdominal aortic aneurysm (AAA) without rupture (H), ST elevation myocardial infarction involving right coronary artery (H), Wound infection after surgery      Dose: 60 mg  Take 1 tablet (60 mg) by mouth daily  Quantity: 90 tablet  Refills: 2     metoprolol tartrate 100 MG tablet  Commonly known as: LOPRESSOR  Used for: Congestive heart failure, unspecified HF chronicity, unspecified heart failure type (H)      Dose: 100 mg  Take 1 tablet (100 mg) by mouth daily  Quantity: 90 tablet  Refills: 3     predniSONE 5 MG tablet  Commonly known as: DELTASONE  Used for: KIDNEY TRANSPLANT STATUS, Abdominal aortic aneurysm (AAA) without rupture (H), ST elevation myocardial infarction (STEMI), unspecified artery (H), Peripheral artery disease (H)      Dose: 5 mg  Take 1 tablet (5 mg) by mouth daily  Quantity: 90 tablet  Refills: 3     psyllium 58.6 % powder  Commonly known as: METAMUCIL/KONSYL      Take by mouth every morning  Refills: 0     * sirolimus 2 MG tablet  Commonly known as: GENERIC EQUIVALENT  Used for: Kidney transplanted, Immunosuppressive management encounter following kidney transplant, Aftercare following organ transplant      HOLD for surgery  Quantity: 30 tablet  Refills: 11     * sirolimus 2 MG tablet  Commonly known as: GENERIC EQUIVALENT      Take by mouth daily  Refills: 0     tacrolimus 0.5 MG capsule  Commonly known as: GENERIC  EQUIVALENT  Used for: Kidney transplanted, Immunosuppressive management encounter following kidney transplant, Aftercare following organ transplant      Dose: 1.5 mg  Take 3 capsules (1.5 mg) by mouth 2 times daily  Quantity: 180 capsule  Refills: 1     torsemide 10 MG tablet  Commonly known as: DEMADEX  Used for: Congestive heart failure, unspecified HF chronicity, unspecified heart failure type (H)      Dose: 10 mg  Take 1 tablet (10 mg) by mouth daily Take an additional 10 MG every other day as needed. Additional use as directed by [facility] clinic.  Quantity: 135 tablet  Refills: 1         * This list has 2 medication(s) that are the same as other medications prescribed for you. Read the directions carefully, and ask your doctor or other care provider to review them with you.               Where to get your medicines      These medications were sent to [facility] [place], [place] - [address] [address]    Phone: [phone]     cephALEXin 250 MG capsule    oxyCODONE 5 MG tablet       Vy Bacu, CNP  Vascular Surgery  Pager: [phone]  [email or web address]  Send message or 10 digit call back number Securely via [employer] with the [employer] Web Console (learn more here)'

## 2023-06-29 NOTE — TELEPHONE ENCOUNTER
"Spoke with Maribel regarding how pt is doing s/p R femoral bypass and pseudoaneurysm repair.    Pt reports their pain is moderate, worse with activity, and they are using PRN oxycodone to control their pain. They are not taking tylenol or IBU. I instructed her to start tylenol 1000mg TID scheduled. She will start this today. She reports spending most of the day in bed today. She is able to ambulate independently with her walker. I encouraged her to get up and walk with her walker and to sit upright in a chair as she can tolerate. She verbalized understanding of this. The incision is WNL and free of signs of infection. Pt reports they are eating and drinking w/o difficulty. She reports her appetite is \"okay\" and she does not have nausea after eating. Pt is voiding w/o difficulty and has had a bowel movement today. She does not feel constipated. We discussed adding in senna 1 tab BID and/or Miralax if needed while taking oxycodone.    VQI measures: Pt has been prescribed ASA, Plavix, and atorvastatin and reports they are taking it as prescribed.    Confirmed follow up appointments and provided callback number for further questions/concerns.    BALDO Perea, RN  RN Care Coordinator  [facility] Vascular Surgery - [facility] phone: [phone]  Fax: [phone]      "

## 2023-06-29 NOTE — RESULT ENCOUNTER NOTE
Dear Maribel,   Your test results are all back -   I hope your recover quickly from your surgery.  I do want to follow up on your anemia - sounds like they did give you some blood in surgery.  Looks like your creatinine is also up - I would like to have you meet with transplant to discuss medications because your GFR is down which may requires some dose changes.  Please let me know if you have questions.  Let us know if you have any questions.  -Lisa Martinez, DO

## 2023-06-30 NOTE — TELEPHONE ENCOUNTER
Prescription approved per [facility] Refill Protocol.      Alejandro LAURA RN   [facility]

## 2023-06-30 NOTE — TELEPHONE ENCOUNTER
Left message and sent [place] message to patient regarding:  Tacrolimus = 2.7 (6/28/23)  Goal 4-6, closer to 4  Current dose 1.5 mg BID        LPN task:  Please confirm Tac is a 12 hour level.  Verify current dose is 1.5 mg BID  Confirm no new medications or illness (shaheed. Diarrhea).  MISSED DOSES?   If good trough and correct dose above, recommend:   increase dose to 2 mg BID.   Should be getting weekly labs (Medication switch)  Make sure it is a good trough to avoid additional lab draws.

## 2023-06-30 NOTE — TELEPHONE ENCOUNTER
Tacrolimus = 2.7 (6/28/23)  Goal 4-6, closer to 4  Current dose 1.5 mg BID      LPN task:  Please confirm Tac is a 12 hour level.  Verify current dose is 1.5 mg BID  Confirm no new medications or illness (shaheed. Diarrhea).  MISSED DOSES?   If good trough and correct dose above, recommend:   increase dose to 2 mg BID.   Should be getting weekly labs (Medication switch)  Make sure it is a good trough to avoid additional lab draws.

## 2023-06-30 NOTE — TELEPHONE ENCOUNTER
Called and LVM for Pt requesting return call to clinic to touch base on how she is feeling post-op. Clinic telephone provided.     Gabi Downs LPN

## 2023-06-30 NOTE — TELEPHONE ENCOUNTER
MTM referral from: Transitions of Care (recent hospital discharge or ED visit)    MTM referral outreach attempt #2 on June 30, 2023 at 12:02 PM      Outcome: Patient declined, will route to MTM Pharmacist/Provider as an FYI. Thank you for the referral.     Use [facility] part d map for the carrier/Plan on the flowsheet    Cuba Gonzalez [facility]

## 2023-07-03 NOTE — TELEPHONE ENCOUNTER
confirmed Tac is a 12 hour level.  Verified current dose is 1.5 mg BID  Confirmed no new medications or illness (shaheed. Diarrhea).  Confirmed increase dose to 2 mg BID and repeat labs in 1 week.

## 2023-07-06 NOTE — TELEPHONE ENCOUNTER
LISINOPRIL 2.5MG TABS   Last Written Prescription Date:  6/7/22  Last Fill Quantity: 90,   # refills: 3  Last Office Visit : 6/14/23  Future Office visit:  9/6/23    Routing refill request to provider for review/approval because:     Cr= 1.80 on 6/28/23 ( kidney tx 2004)  06/28/23 (!) 150/89   06/21/23 96/68   06/14/23 (!) 144/83

## 2023-07-10 NOTE — PROGRESS NOTES
"[facility] Vascular      Type of Visit: Virtual: Mikaela    Patient is here for a return visit to discuss follow up.     Vitals - Patient Reported  Weight (Patient Reported): 38.6 kg (85 lb)  Height (Patient Reported): 154.9 cm (5' 1\")  BMI (Based on Pt Reported Ht/Wt): 16.06  Pain Score: No Pain (0)      Questions patient would like addressed today are: Patient verbalized no questions/concerns, this has been communicated to the provider.     Refills are needed: No    How would you like to obtain your AVS? Darrellhart  Virtual Visit Details    Type of service:  Telephone Visit   Phone call duration: 17 minutes   "

## 2023-07-10 NOTE — PROGRESS NOTES
VASCULAR SURGERY PROGRESS NOTE    LOCATION: [facility]     Maribel Yang  Medical Record #:  [id number]  YOB: 1950  Age:  72 year old     Date of Service: 7/10/2023    PRIMARY CARE PROVIDER: Lisa Martinez    Reason for visit: post-op follow up    IMPRESSION / RECOMMENDATION:   Maribel Yang is a 72-year-old female who on 6/27/2023 underwent R femoral to profunda bypass with cadaveric interposition graft, with ligation of R femoral pseudoaneurysm with Dr. Ferrara.  Now presents via telephone in clinic for follow-up.  Patient was discharged on antibiotics recommended for 2 weeks however had to stop them after the first week due to unbearable abdominal pain, severe nausea and inability to tolerate the antibiotic.  She denies pain, notes that her right groin Tegaderm continues to be intact.  Patient will send a picture via [employer].  Denies redness at the incision site, no difficulty walking.  Describes that her right leg is numb, more pronounced compared to preop.  Denies discoloration in her lower extremity, stated that both feet are warm on palpation.  Patient has follow-up appointment scheduled with Dr. Ferrara on 7/19/2023 and arterial studies on 7/17/2023.    All questions were answered and support provided. She has our contact information and knows to reach out with any additional questions or concerns.       Vy Leung, CNP  Vascular Surgery  Pager: [phone]  [email or web address]  Send message or 10 digit call back number Securely via [employer] with the [employer] Web Console (learn more here)        HPI:  Maribel Yang is a 72 year old female with h/o HTN, HLD, PVD, CAD, STEMI, HFrEF (30-35%), COPD, SCC of R lung s/p radiation, anal canal carcinoma s/p chemoradiation, ESRD s/p LDKT (2014), stage III CKD, h/o DVT, with vascular surgical history notable for: AAA s/p aorto-uni iliac with R to L fem fem with artegraft(4/6/21), infection of previous fem fem thus with redo fem  fem with cryo artery and sartorius flap, who unfortunately developed a pseudoaneurysm of R fem to CFA anastomosis which grew 2cm->4.6cm, and she is now s/p R femoral to profunda bypass with cadaveric interposition graft, with ligation of R femoral pseudoaneurysm on 6/27/23.   Postoperatively she did well and on postop day 2 was discharged to home.  Patient denies pain, notes that her right groin dressing/Tegaderm remains intact.  She noted 1 drop of blood potentially drained from site last week, no further events.  No incisional redness, denies swelling. Patient will send a picture via [employer].  She had poor tolerance to the antibiotics prescribed however managed to take it for 7 days.  Her right lower extremity is more numb than it has been before however hopes that it will return to baseline in the future.  Notes bilateral lower extremity a warm, no discoloration, no wounds. Ms. Yang is glad to be able to sleep on her abdomen again. Denies fevers and chills.     REVIEW OF SYSTEMS:    A 12 point ROS was reviewed and is negative except for what is listed above in HPI.    PHH:    Past Medical History:   Diagnosis Date     Abnormal coagulation profile     p [id number]>A heterozygote      Age-related osteoporosis without current pathological fracture 06/22/2019     Anemia      Antiplatelet or antithrombotic long-term use      ASCUS with positive high risk HPV 2007, 2015    + HPV 56, 54,& 6, colp - TAL III, Leep =TAL II     Basal cell carcinoma      Congestive heart failure, unspecified HF chronicity, unspecified heart failure type (H) 06/22/2021     COPD (chronic obstructive pulmonary disease) (H)      Depressive disorder 07/2015     Heart attack (H)      History of blood transfusion      Hypertension      Immunosuppressed status (H)     due meds     Kidney replaced by transplant 09/2004    Living donor recipient,  Rejection 7/2005     LSIL (low grade squamous intraepithelial lesion) on Pap smear 04/2013    +HPV 33 or  45, 61       PAD (peripheral artery disease) (H)      PONV (postoperative nausea and vomiting)      Squamous cell lung cancer (H)      Thrombosis of leg 1967     Unspecified disorder of kidney and ureter     X-linked dominant Alport's syndrome.          Past Surgical History:   Procedure Laterality Date     BIOPSY      Kidney, Lung, Breast     BIOPSY ANAL N/A 03/14/2018    Procedure: BIOPSY ANAL;;  Surgeon: Shabbir Leo MD;  [facility]     BYPASS GRAFT FEMORAL FEMORAL Bilateral 04/25/2021    Procedure: Axplantation infected graft, femoral to femoral bypass with cadaveric artery, bilateral SATORIUS MUSCLE FLAP CREATION, evacuation of absece, vacuum closure;  Surgeon: Raven Lewis MD;  [facility]     COLONOSCOPY       COLONOSCOPY N/A 08/09/2017    Procedure: COMBINED COLONOSCOPY, SINGLE OR MULTIPLE BIOPSY/POLYPECTOMY BY BIOPSY;;  Surgeon: Sushil Hyatt MD;  [facility]     COLONOSCOPY N/A 7/28/2022    Procedure: COLONOSCOPY, WITH BIOPSY;  Surgeon: Moise Corcoran MD;  [facility]     COLPOSCOPY,LOOP ELECTRD CERVIX EXCIS  03/11/2008    TAL II     CONIZATION LEEP  07/17/2013    Procedure: CONIZATION LEEP;;  Surgeon: Liliana Renteria MD;  [facility]     CONIZATION LEEP N/A 08/17/2016    Procedure: CONIZATION LEEP;  Surgeon: Liliana Renteria MD;  [facility]     CV CORONARY ANGIOGRAM N/A 04/06/2021    Procedure: CV CORONARY ANGIOGRAM;  Surgeon: Sincere Rosas MD;  Location:  [facility]     CV CORONARY ANGIOGRAM N/A 04/25/2021    Procedure: Coronary Angiogram;  Surgeon: Sincere Rosas MD;  Location:  [facility]     CV PCI STENT DRUG ELUTING N/A 04/06/2021    Procedure: Percutaneous Coronary Intervention Stent Drug Eluting;  Surgeon: Sincere Rosas MD;  Location:  [facility] CATH [facility]     ENDOVASCULAR REPAIR ANEURYSM AORTOILIAC Bilateral 04/06/2021    Procedure: Endovascular Abdominal Aortic  Aneurysm Repair with Aortouniiliac Device and Femoral-Femoral Artery Bypass with [id number] Artegraft;  Surgeon: Abena Ferrara MD;  [facility]     ESOPHAGOSCOPY, GASTROSCOPY, DUODENOSCOPY (EGD), COMBINED N/A 2/2/2023    Procedure: ESOPHAGOGASTRODUODENOSCOPY, WITH BIOPSY;  Surgeon: Yazan Heredia MD;  Location: [facility]     EXAM UNDER ANESTHESIA ANUS  07/15/2014    Procedure: EXAM UNDER ANESTHESIA ANUS;  Surgeon: Radha Musa MD;  [facility]     EXAM UNDER ANESTHESIA ANUS N/A 03/14/2018    Procedure: EXAM UNDER ANESTHESIA ANUS;  Anal Exam Under Anesthesia With Excision of anal lesion, proctoscopy;  Surgeon: Shabbir Leo MD;  [facility]     EYE SURGERY       GENITOURINARY SURGERY       IR OR ANGIOGRAM  04/06/2021     IRRIGATION AND DEBRIDEMENT GROIN Right 04/24/2021    Procedure: IRRIGATION AND DEBRIDEMENT, INGUINAL REGION, I & D PF RIGHT GROIN;  Surgeon: Raven Lewis MD;  [facility]     IRRIGATION AND DEBRIDEMENT GROIN N/A 04/26/2021    Procedure: IRRIGATION AND DEBRIDEMENT, INGUINAL REGION, PLACEMENT OF VERAFLO WOUND VAC, WOUND WASHOUT,;  Surgeon: Raven Lewis MD;  [facility]     LASER CO2 EXCISE VULVA WIDE LOCAL  07/15/2014    Procedure: LASER CO2 EXCISE VULVA WIDE LOCAL;  Surgeon: Liliana Renteria MD;  [facility]     LASER CO2 VAGINA  07/17/2013    Procedure: LASER CO2 VAGINA;;  Surgeon: Liliana Renteria MD;  [facility]     LASER CO2 VAGINA N/A 09/25/2018    Procedure: LASER CO2 VAGINA;  Exam Under Anesthesia, CO2 Laser Ablation of Upper Vagina and Cervix;  Surgeon: Pati Garcia MD;  [facility]     MICROSCOPY ANAL  07/17/2013    Procedure: MICROSCOPY ANAL;  Anal Microscopy,  EUA vagina,Colposcopy Of Vagina And Vulva, Vaginal Biopsies, Omniguide Co2 Laser To Vagina and vulva, Loop Electrosurgical Excision Procedure To Cervix;  Surgeon: Radha Musa MD;  [facility]     MICROSCOPY ANAL  07/15/2014    Procedure: MICROSCOPY  ANAL;  Surgeon: Radha Musa MD;  [facility]     PICC DOUBLE LUMEN PLACEMENT Right 04/30/2021    39cm, Basilic vein     PSEUDOANEURYSM REPAIR Right 6/27/2023    Procedure: RIGHT FEMORAL TO PROFUNDA FEMORIS ARTERY BYPASS WITH cadaveric femoral-popliteal artery, REPAIR OF RIGHT GROIN PSEUDOANEURYSM;  Surgeon: Abena Ferrara MD;  [facility]     TRANSESOPHAGEAL ECHOCARDIOGRAM INTRAOPERATIVE N/A 04/28/2021    Procedure: ECHOCARDIOGRAM, TRANSESOPHAGEAL, INTRAOPERATIVE;  Surgeon: GENERIC ANESTHESIA PROVIDER;  Location[facility]     VASCULAR SURGERY      Thrombectomy     [facility] NONSPECIFIC PROCEDURE      Thrombectomy     [facility] NONSPECIFIC PROCEDURE  1955 and 1959    Bilater eye surgery - correction for crossed eyes     [facility] NONSPECIFIC PROCEDURE  1998    oopherectomy L     [facility] NONSPECIFIC PROCEDURE  1967    open kidney biopsy - L     [facility] TRANSPLANTATION OF KIDNEY  09/2004    recipient -- done at [facility]       ALLERGIES:  Blood transfusion related (informational only), Tramadol-acetaminophen, and Hydrocodone    MEDS:    Current Outpatient Medications:      acetaminophen (TYLENOL) 325 MG tablet, Take 2 tablets (650 mg) by mouth every 4 hours as needed for other (For optimal non-opioid multimodal pain management to improve pain control.), Disp: 100 tablet, Rfl: 3     albuterol (PROAIR HFA/PROVENTIL HFA/VENTOLIN HFA) 108 (90 Base) MCG/ACT inhaler, Inhale 1-2 puffs into the lungs every 6 hours as needed for shortness of breath, wheezing or cough, Disp: 18 g, Rfl: 4     ASPIRIN LOW DOSE 81 MG chewable tablet, CHEW AND SWALLOW 1 TABLET (81 MG) BY MOUTH DAILY, Disp: 90 tablet, Rfl: 0     atorvastatin (LIPITOR) 80 MG tablet, Take 1 tablet (80 mg) by mouth daily, Disp: 90 tablet, Rfl: 3     calcium carbonate-vitamin D (CALTRATE) 600-10 MG-MCG per tablet, TAKE ONE TABLET BY MOUTH TWICE A DAY, Disp: 120 tablet, Rfl: 0     clopidogrel (PLAVIX) 75 MG tablet, Take 1 tablet (75 mg) by mouth daily, Disp: 90 tablet, Rfl: 3      hydrALAZINE (APRESOLINE) 10 MG tablet, Take 1 tablet (10 mg) by mouth 3 times daily, Disp: 270 tablet, Rfl: 3     isosorbide mononitrate (IMDUR) 60 MG 24 hr tablet, Take 1 tablet (60 mg) by mouth daily, Disp: 90 tablet, Rfl: 2     lisinopril (ZESTRIL) 2.5 MG tablet, TAKE 1 TABLET (2.5 MG) BY MOUTH DAILY, Disp: 90 tablet, Rfl: 3     metoprolol tartrate (LOPRESSOR) 100 MG tablet, Take 1 tablet (100 mg) by mouth daily, Disp: 90 tablet, Rfl: 3     Nutritional Supplements (ENSURE CLEAR) LIQD, Take 1 Bottle by mouth daily, Disp: 396 mL, Rfl: 11     predniSONE (DELTASONE) 5 MG tablet, Take 1 tablet (5 mg) by mouth daily, Disp: 90 tablet, Rfl: 3     psyllium (METAMUCIL/KONSYL) 58.6 % powder, Take by mouth every morning, Disp: , Rfl:      sirolimus (GENERIC EQUIVALENT) 2 MG tablet, Take by mouth daily, Disp: , Rfl:      tacrolimus (GENERIC EQUIVALENT) 0.5 MG capsule, Take 4 capsules (2 mg) by mouth 2 times daily, Disp: 240 capsule, Rfl: 11     torsemide (DEMADEX) 10 MG tablet, Take 1 tablet (10 mg) by mouth daily Take an additional 10 MG every other day as needed. Additional use as directed by [facility]., Disp: 135 tablet, Rfl: 1     cephALEXin (KEFLEX) 250 MG capsule, Take 1 capsule (250 mg) by mouth every 8 hours for 14 days (Patient not taking: Reported on 7/10/2023), Disp: 42 capsule, Rfl: 0     esomeprazole (NEXIUM) 20 MG DR capsule, Take 1 capsule (20 mg) by mouth every morning (before breakfast) Take 30-60 minutes before eating. (Patient not taking: Reported on 7/10/2023), Disp: 30 capsule, Rfl: 5     fluticasone (FLONASE) 50 MCG/ACT nasal spray, Spray 1 spray into both nostrils daily (Patient not taking: Reported on 7/10/2023), Disp: 18.2 mL, Rfl: 3     Lactobacillus-Inulin ([facility] DIGESTIVE HEALTH) CAPS, TAKE ONE CAPSULE BY MOUTH ONCE DAILY (DUE FOR PHYSICAL IN MARCH) (Patient not taking: Reported on 7/10/2023), Disp: 90 capsule, Rfl: 3     oxyCODONE (ROXICODONE) 5 MG tablet, Take 1 tablet (5 mg) by mouth  every 4 hours as needed for severe pain (Patient not taking: Reported on 7/10/2023), Disp: 12 tablet, Rfl: 0     sirolimus (GENERIC EQUIVALENT) 2 MG tablet, HOLD for surgery, Disp: 30 tablet, Rfl: 11    SOCIAL HABITS:    History   Smoking Status     Some Days     Packs/day: 0.30     Years: 35.00     Types: Cigarettes     Start date: 1967     Last attempt to quit: 3/1/2021   Smokeless Tobacco     Never     Social History    Substance and Sexual Activity      Alcohol use: Not Currently        Comment: rarely      History   Drug Use Unknown     Comment: occasional use       FAMILY HISTORY:    Family History   Problem Relation Age of Onset     Diabetes Father      Alcohol/Drug Father      Arthritis Father      Hypertension Father      Lipids Father         high cholesterol     Arthritis Mother      Diabetes Mother      Depression Mother      Heart Disease Mother      Neurologic Disorder Mother      Obesity Mother      Psychotic Disorder Mother      Thyroid Disease Mother      Hypertension Mother      Gynecology Sister         Precancerous cell removal from cervix at age 45     Depression Sister      Allergies Sister      Alcohol/Drug Sister      Neurologic Disorder Sister      Cerebrovascular Disease Paternal Grandmother      Diabetes Paternal Grandmother      Alcohol/Drug Son      Colon Polyps Sister      Breast Cancer Niece      Other Cancer Sister         Cervical     Obesity Sister      Depression Sister      Substance Abuse Son      Substance Abuse Sister              Depression Sister      Asthma Other      Colon Cancer No family hx of      Crohn's Disease No family hx of      Ulcerative Colitis No family hx of      Melanoma No family hx of      Skin Cancer No family hx of        PE:  There were no vitals taken for this visit.  Wt Readings from Last 1 Encounters:   23 38.6 kg (85 lb 1.6 oz)     There is no height or weight on file to calculate BMI.  Exam via telephone, based on patient conveying  information  GENERAL: Healthy, alert and no distress  RESP: No audible wheeze, cough, or visible cyanosis.  No audible increased work of breathing.   NEURO: Cranial nerves grossly intact via telephone exam.  Mentation and speech appropriate for age.  PSYCH: Mentation appears normal, affect normal/bright, judgement and insight intact, normal speech.

## 2023-07-10 NOTE — NURSING NOTE
Patient confirms medications and allergies are accurate via patients echeck in completion, and or denies any changes since last reviewed/verified.     Is the patient currently in the state of [place]? YES    Visit mode:VIDEO    If the visit is dropped, the patient can be reconnected by: VIDEO VISIT: Text to cell phone: [phone]    Will anyone else be joining the visit? NO      How would you like to obtain your AVS? MyChart    Are changes needed to the allergy or medication list? NO    Reason for visit: RECHECK            Julissa Rush, Virtual Facilitator

## 2023-07-10 NOTE — LETTER
"7/10/2023       RE: Maribel Yang  [address]  [facility] [address]       Dear Colleague,    Thank you for referring your patient, Maribel Yang, to the [facility] VASCULAR CLIN[facility] [place] at [facility]. Please see a copy of my visit note below.    [facility] Vascular      Type of Visit: Virtual: AmWell    Patient is here for a return visit to discuss follow up.     Vitals - Patient Reported  Weight (Patient Reported): 38.6 kg (85 lb)  Height (Patient Reported): 154.9 cm (5' 1\")  BMI (Based on Pt Reported Ht/Wt): 16.06  Pain Score: No Pain (0)      Questions patient would like addressed today are: Patient verbalized no questions/concerns, this has been communicated to the provider.     Refills are needed: No    How would you like to obtain your AVS? BYTEGRIDhart  Virtual Visit Details    Type of service:  Telephone Visit   Phone call duration: 17 minutes         VASCULAR SURGERY PROGRESS NOTE    LOCATION: [facility]     Maribel Yang  Medical Record #:  [id number]  YOB: 1950  Age:  72 year old     Date of Service: 7/10/2023    PRIMARY CARE PROVIDER: Lisa Martinez    Reason for visit: post-op follow up    IMPRESSION / RECOMMENDATION:   Maribel Yang is a 72-year-old female who on 6/27/2023 underwent R femoral to profunda bypass with cadaveric interposition graft, with ligation of R femoral pseudoaneurysm with Dr. Ferrara.  Now presents via telephone in clinic for follow-up.  Patient was discharged on antibiotics recommended for 2 weeks however had to stop them after the first week due to unbearable abdominal pain, severe nausea and inability to tolerate the antibiotic.  She denies pain, notes that her right groin Tegaderm continues to be intact.  Patient will send a picture via MyGoGames.  Denies redness at the incision site, no difficulty walking.  Describes that her right leg is numb, more pronounced compared to " preop.  Denies discoloration in her lower extremity, stated that both feet are warm on palpation.  Patient has follow-up appointment scheduled with Dr. Ferrara on 7/19/2023 and arterial studies on 7/17/2023.    All questions were answered and support provided. She has our contact information and knows to reach out with any additional questions or concerns.       Vy Leung, CNP  Vascular Surgery  Pager: [phone]  [email or web address]  Send message or 10 digit call back number Securely via Kakoona with the Kakoona Web Console (learn more here)        HPI:  Maribel Yang is a 72 year old female with h/o HTN, HLD, PVD, CAD, STEMI, HFrEF (30-35%), COPD, SCC of R lung s/p radiation, anal canal carcinoma s/p chemoradiation, ESRD s/p LDKT (2014), stage III CKD, h/o DVT, with vascular surgical history notable for: AAA s/p aorto-uni iliac with R to L fem fem with artegraft(4/6/21), infection of previous fem fem thus with redo fem fem with cryo artery and sartorius flap, who unfortunately developed a pseudoaneurysm of R fem to CFA anastomosis which grew 2cm->4.6cm, and she is now s/p R femoral to profunda bypass with cadaveric interposition graft, with ligation of R femoral pseudoaneurysm on 6/27/23.   Postoperatively she did well and on postop day 2 was discharged to home.  Patient denies pain, notes that her right groin dressing/Tegaderm remains intact.  She noted 1 drop of blood potentially drained from site last week, no further events.  No incisional redness, denies swelling. Patient will send a picture via [employer].  She had poor tolerance to the antibiotics prescribed however managed to take it for 7 days.  Her right lower extremity is more numb than it has been before however hopes that it will return to baseline in the future.  Notes bilateral lower extremity a warm, no discoloration, no wounds. Ms. Yang is glad to be able to sleep on her abdomen again. Denies fevers and chills.     REVIEW OF SYSTEMS:     A 12 point ROS was reviewed and is negative except for what is listed above in HPI.    PHH:    Past Medical History:   Diagnosis Date    Abnormal coagulation profile     p [id number] heterozygote     Age-related osteoporosis without current pathological fracture 06/22/2019    Anemia     Antiplatelet or antithrombotic long-term use     ASCUS with positive high risk HPV 2007, 2015    + HPV 56, 54,& 6, colp - TAL III, Leep =TAL II    Basal cell carcinoma     Congestive heart failure, unspecified HF chronicity, unspecified heart failure type (H) 06/22/2021    COPD (chronic obstructive pulmonary disease) (H)     Depressive disorder 07/2015    Heart attack (H)     History of blood transfusion     Hypertension     Immunosuppressed status (H)     due meds    Kidney replaced by transplant 09/2004    Living donor recipient,  Rejection 7/2005    LSIL (low grade squamous intraepithelial lesion) on Pap smear 04/2013    +HPV 33 or 45, 61      PAD (peripheral artery disease) (H)     PONV (postoperative nausea and vomiting)     Squamous cell lung cancer (H)     Thrombosis of leg 1967    Unspecified disorder of kidney and ureter     X-linked dominant Alport's syndrome.          Past Surgical History:   Procedure Laterality Date    BIOPSY      Kidney, Lung, Breast    BIOPSY ANAL N/A 03/14/2018    Procedure: BIOPSY ANAL;;  Surgeon: Shabbir Leo MD;  [facility]    BYPASS GRAFT FEMORAL FEMORAL Bilateral 04/25/2021    Procedure: Axplantation infected graft, femoral to femoral bypass with cadaveric artery, bilateral SATORIUS MUSCLE FLAP CREATION, evacuation of absece, vacuum closure;  Surgeon: Raven Lewis MD;  [facility]    COLONOSCOPY      COLONOSCOPY N/A 08/09/2017    Procedure: COMBINED COLONOSCOPY, SINGLE OR MULTIPLE BIOPSY/POLYPECTOMY BY BIOPSY;;  Surgeon: Sushil Hyatt MD;  [facility] GI    COLONOSCOPY N/A 7/28/2022    Procedure: COLONOSCOPY, WITH BIOPSY;  Surgeon: Moise Corcoran MD;  [facility]    COLPOSCOPY,LOOP ELECTRD CERVIX EXCIS  03/11/2008    TAL II    CONIZATION LEEP  07/17/2013    Procedure: CONIZATION LEEP;;  Surgeon: Liliana Renteria MD;  [facility]    CONIZATION LEEP N/A 08/17/2016    Procedure: CONIZATION LEEP;  Surgeon: Liliana Renteria MD;  [facility]    CV CORONARY ANGIOGRAM N/A 04/06/2021    Procedure: CV CORONARY ANGIOGRAM;  Surgeon: Sincere Rosas MD;  Location:  HEART CARDIAC CATH LAB    CV CORONARY ANGIOGRAM N/A 04/25/2021    Procedure: Coronary Angiogram;  Surgeon: Sincere Rosas MD;  Location:  HEART CARDIAC CATH LAB    CV PCI STENT DRUG ELUTING N/A 04/06/2021    Procedure: Percutaneous Coronary Intervention Stent Drug Eluting;  Surgeon: Sincere Rosas MD;  Location:  HEART CARDIAC CATH LAB    ENDOVASCULAR REPAIR ANEURYSM AORTOILIAC Bilateral 04/06/2021    Procedure: Endovascular Abdominal Aortic Aneurysm Repair with Aortouniiliac Device and Femoral-Femoral Artery Bypass with [id number] Artegraft;  Surgeon: Abena Ferrara MD;  [facility]    ESOPHAGOSCOPY, GASTROSCOPY, DUODENOSCOPY (EGD), COMBINED N/A 2/2/2023    Procedure: ESOPHAGOGASTRODUODENOSCOPY, WITH BIOPSY;  Surgeon: Yazan Heredia MD;  Location:  GI    EXAM UNDER ANESTHESIA ANUS  07/15/2014    Procedure: EXAM UNDER ANESTHESIA ANUS;  Surgeon: Radha Musa MD;  [facility]    EXAM UNDER ANESTHESIA ANUS N/A 03/14/2018    Procedure: EXAM UNDER ANESTHESIA ANUS;  Anal Exam Under Anesthesia With Excision of anal lesion, proctoscopy;  Surgeon: Shabbir Leo MD;  [facility]    EYE SURGERY      GENITOURINARY SURGERY      IR OR ANGIOGRAM  04/06/2021    IRRIGATION AND DEBRIDEMENT GROIN Right 04/24/2021    Procedure: IRRIGATION AND DEBRIDEMENT, INGUINAL REGION, I & D PF RIGHT GROIN;  Surgeon: Raven Lewis MD;  [facility]    IRRIGATION AND DEBRIDEMENT GROIN N/A 04/26/2021    Procedure: IRRIGATION AND DEBRIDEMENT, INGUINAL  REGION, PLACEMENT OF VERAFLO WOUND VAC, WOUND WASHOUT,;  Surgeon: Raven Lewis MD;  [facility]    LASER CO2 EXCISE VULVA WIDE LOCAL  07/15/2014    Procedure: LASER CO2 EXCISE VULVA WIDE LOCAL;  Surgeon: Liliana Renteria MD;  [facility]    LASER CO2 VAGINA  07/17/2013    Procedure: LASER CO2 VAGINA;;  Surgeon: Liliana Renteria MD;  [facility]    LASER CO2 VAGINA N/A 09/25/2018    Procedure: LASER CO2 VAGINA;  Exam Under Anesthesia, CO2 Laser Ablation of Upper Vagina and Cervix;  Surgeon: Pati Garcia MD;  [facility]    MICROSCOPY ANAL  07/17/2013    Procedure: MICROSCOPY ANAL;  Anal Microscopy,  EUA vagina,Colposcopy Of Vagina And Vulva, Vaginal Biopsies, Omniguide Co2 Laser To Vagina and vulva, Loop Electrosurgical Excision Procedure To Cervix;  Surgeon: Radha Musa MD;  [facility]    MICROSCOPY ANAL  07/15/2014    Procedure: MICROSCOPY ANAL;  Surgeon: Radha Musa MD;  [facility]    PICC DOUBLE LUMEN PLACEMENT Right 04/30/2021    39cm, Basilic vein    PSEUDOANEURYSM REPAIR Right 6/27/2023    Procedure: RIGHT FEMORAL TO PROFUNDA FEMORIS ARTERY BYPASS WITH cadaveric femoral-popliteal artery, REPAIR OF RIGHT GROIN PSEUDOANEURYSM;  Surgeon: Abena Ferrara MD;  [facility]    TRANSESOPHAGEAL ECHOCARDIOGRAM INTRAOPERATIVE N/A 04/28/2021    Procedure: ECHOCARDIOGRAM, TRANSESOPHAGEAL, INTRAOPERATIVE;  Surgeon: GENERIC ANESTHESIA PROVIDER;  Location[facility]    VASCULAR SURGERY      Thrombectomy    [facility] NONSPECIFIC PROCEDURE      Thrombectomy    [facility] NONSPECIFIC PROCEDURE  1955 and 1959    Bilater eye surgery - correction for crossed eyes    [facility] NONSPECIFIC PROCEDURE  1998    oopherectomy L    [facility] NONSPECIFIC PROCEDURE  1967    open kidney biopsy - L    [facility] TRANSPLANTATION OF KIDNEY  09/2004    recipient [facility]        ALLERGIES:  Blood transfusion related (informational only), Tramadol-acetaminophen, and Hydrocodone    MEDS:    Current Outpatient  Medications:     acetaminophen (TYLENOL) 325 MG tablet, Take 2 tablets (650 mg) by mouth every 4 hours as needed for other (For optimal non-opioid multimodal pain management to improve pain control.), Disp: 100 tablet, Rfl: 3    albuterol (PROAIR HFA/PROVENTIL HFA/VENTOLIN HFA) 108 (90 Base) MCG/ACT inhaler, Inhale 1-2 puffs into the lungs every 6 hours as needed for shortness of breath, wheezing or cough, Disp: 18 g, Rfl: 4    ASPIRIN LOW DOSE 81 MG chewable tablet, CHEW AND SWALLOW 1 TABLET (81 MG) BY MOUTH DAILY, Disp: 90 tablet, Rfl: 0    atorvastatin (LIPITOR) 80 MG tablet, Take 1 tablet (80 mg) by mouth daily, Disp: 90 tablet, Rfl: 3    calcium carbonate-vitamin D (CALTRATE) 600-10 MG-MCG per tablet, TAKE ONE TABLET BY MOUTH TWICE A DAY, Disp: 120 tablet, Rfl: 0    clopidogrel (PLAVIX) 75 MG tablet, Take 1 tablet (75 mg) by mouth daily, Disp: 90 tablet, Rfl: 3    hydrALAZINE (APRESOLINE) 10 MG tablet, Take 1 tablet (10 mg) by mouth 3 times daily, Disp: 270 tablet, Rfl: 3    isosorbide mononitrate (IMDUR) 60 MG 24 hr tablet, Take 1 tablet (60 mg) by mouth daily, Disp: 90 tablet, Rfl: 2    lisinopril (ZESTRIL) 2.5 MG tablet, TAKE 1 TABLET (2.5 MG) BY MOUTH DAILY, Disp: 90 tablet, Rfl: 3    metoprolol tartrate (LOPRESSOR) 100 MG tablet, Take 1 tablet (100 mg) by mouth daily, Disp: 90 tablet, Rfl: 3    Nutritional Supplements (ENSURE CLEAR) LIQD, Take 1 Bottle by mouth daily, Disp: 396 mL, Rfl: 11    predniSONE (DELTASONE) 5 MG tablet, Take 1 tablet (5 mg) by mouth daily, Disp: 90 tablet, Rfl: 3    psyllium (METAMUCIL/KONSYL) 58.6 % powder, Take by mouth every morning, Disp: , Rfl:     sirolimus (GENERIC EQUIVALENT) 2 MG tablet, Take by mouth daily, Disp: , Rfl:     tacrolimus (GENERIC EQUIVALENT) 0.5 MG capsule, Take 4 capsules (2 mg) by mouth 2 times daily, Disp: 240 capsule, Rfl: 11    torsemide (DEMADEX) 10 MG tablet, Take 1 tablet (10 mg) by mouth daily Take an additional 10 MG every other day as needed.  Additional use as directed by [facility]., Disp: 135 tablet, Rfl: 1    cephALEXin (KEFLEX) 250 MG capsule, Take 1 capsule (250 mg) by mouth every 8 hours for 14 days (Patient not taking: Reported on 7/10/2023), Disp: 42 capsule, Rfl: 0    esomeprazole (NEXIUM) 20 MG DR capsule, Take 1 capsule (20 mg) by mouth every morning (before breakfast) Take 30-60 minutes before eating. (Patient not taking: Reported on 7/10/2023), Disp: 30 capsule, Rfl: 5    fluticasone (FLONASE) 50 MCG/ACT nasal spray, Spray 1 spray into both nostrils daily (Patient not taking: Reported on 7/10/2023), Disp: 18.2 mL, Rfl: 3    Lactobacillus-Inulin ([employer]) CAPS, TAKE ONE CAPSULE BY MOUTH ONCE DAILY (DUE FOR PHYSICAL IN MARCH) (Patient not taking: Reported on 7/10/2023), Disp: 90 capsule, Rfl: 3    oxyCODONE (ROXICODONE) 5 MG tablet, Take 1 tablet (5 mg) by mouth every 4 hours as needed for severe pain (Patient not taking: Reported on 7/10/2023), Disp: 12 tablet, Rfl: 0    sirolimus (GENERIC EQUIVALENT) 2 MG tablet, HOLD for surgery, Disp: 30 tablet, Rfl: 11    SOCIAL HABITS:    History   Smoking Status    Some Days    Packs/day: 0.30    Years: 35.00    Types: Cigarettes    Start date: 1/1/1967    Last attempt to quit: 3/1/2021   Smokeless Tobacco    Never     Social History    Substance and Sexual Activity      Alcohol use: Not Currently        Comment: rarely      History   Drug Use Unknown     Comment: occasional use       FAMILY HISTORY:    Family History   Problem Relation Age of Onset    Diabetes Father     Alcohol/Drug Father     Arthritis Father     Hypertension Father     Lipids Father         high cholesterol    Arthritis Mother     Diabetes Mother     Depression Mother     Heart Disease Mother     Neurologic Disorder Mother     Obesity Mother     Psychotic Disorder Mother     Thyroid Disease Mother     Hypertension Mother     Gynecology Sister         Precancerous cell removal from cervix at age 45    Depression  Sister     Allergies Sister     Alcohol/Drug Sister     Neurologic Disorder Sister     Cerebrovascular Disease Paternal Grandmother     Diabetes Paternal Grandmother     Alcohol/Drug Son     Colon Polyps Sister     Breast Cancer Niece     Other Cancer Sister         Cervical    Obesity Sister     Depression Sister     Substance Abuse Son     Substance Abuse Sister             Depression Sister     Asthma Other     Colon Cancer No family hx of     Crohn's Disease No family hx of     Ulcerative Colitis No family hx of     Melanoma No family hx of     Skin Cancer No family hx of        PE:  There were no vitals taken for this visit.  Wt Readings from Last 1 Encounters:   23 38.6 kg (85 lb 1.6 oz)     There is no height or weight on file to calculate BMI.  Exam via telephone, based on patient conveying information  GENERAL: Healthy, alert and no distress  RESP: No audible wheeze, cough, or visible cyanosis.  No audible increased work of breathing.   NEURO: Cranial nerves grossly intact via telephone exam.  Mentation and speech appropriate for age.  PSYCH: Mentation appears normal, affect normal/bright, judgement and insight intact, normal speech.            Again, thank you for allowing me to participate in the care of your patient.      Sincerely,    SANDRA Jasso CNP

## 2023-07-19 NOTE — NURSING NOTE
Chief Complaint   Patient presents with     Follow Up     3 week follow up, no updates per pt. Medications/allergies reviewed with pt, no changes per pt.        Is the patient currently in the state of [place]? YES    Visit mode:VIDEO    If the visit is dropped, the patient can be reconnected by: VIDEO VISIT: Text to cell phone: [phone]    Will anyone else be joining the visit? NO      How would you like to obtain your AVS? MyChart    Are changes needed to the allergy or medication list? NO    Patient denies any changes since echeck-in regarding medication and allergies and states all information entered during echeck-in remains accurate.    Radha Rogers, Visit Facilitator/MA.

## 2023-07-19 NOTE — LETTER
7/19/2023       RE: Maribel Yang  [address]  [facility] [address]       Dear Colleague,    Thank you for referring your patient, Maribel Yang, to the [facility] VASCULAR CLINIC [place] at [facility]. Please see a copy of my visit note below.    No notes on file    Again, thank you for allowing me to participate in the care of your patient.      Sincerely,    Abena Ferrara MD

## 2023-07-19 NOTE — PROGRESS NOTES
Vascular Surgery Clinic Post-Procedure Follow Up Visit    July 19, 2023    Maribel Yang  [id number]      HPI: Patient follows up today s/p repair of right femoral pseudoaneurysm off the cadaveric fem-fem. This was repaired with a jump graft from the fem-fem to the profunda with ligation and division of the intervening segment of pseudoaneurysm.  She denies pain and has been overall feeling well.  She did note some increased swelling in the area of the old pseudoaneurysm approxi-1 week ago.  We did a duplex after this which showed no arterial flow, rather old fluid collection which would be typical after the surgery.  Feeling much better after the antibiotics were stopped.  No signs of infection.      Please see Epic rooming record from today documenting vital signs and current medications.    On exam, pleasant 72 year old in NAD.  Right suprapubic and leg incisions well-healed. Area of old pseudoaneurysm with some firmness from old serum from closure of pseudo and residual rind.    Duplex from July 13, 2023 shows a patent functioning femoral to profunda bypass with no recurrence in the pseudoaneurysm but old seroma noted.    AP: Doing well s/p repair of right femoral pseudoaneurysm without evidence of infection.  The glued on Tegaderm's will come off over the next 1 to 2 weeks as would be expected.  We will repeat the ultrasound of the old fluid collection next week to ensure it is stable.  We discussed that if it became bothersome over time that it could be aspirated but most likely her body will take care of it over the ensuing weeks.      Many thanks for involving me in the care of this very pleasant patient. Should any questions or concerns arise, please don't hesitate to contact me.    Warm Regards,    Abena Ferrara MD, DFSVS, RPVI  Director, [place] [facility]  Professor and Chief, Vascular and Endovascular Surgery  [facility]  [email or web address]  Pager: Herminia mason      Maribel is a 72 year  old who is being evaluated via a billable video visit.      How would you like to obtain your AVS? MyChart  If the video visit is dropped, the invitation should be resent by: Text to cell phone: [phone]  Will anyone else be joining your video visit? No        Video-Visit Details    Type of service:  Video Visit   Video Start Time: 1135  Video End Time:1150    Originating Location (pt. Location): Home  Distant Location (provider location):  Off-site  Platform used for Video Visit: JeffWell

## 2023-07-24 NOTE — TELEPHONE ENCOUNTER
----- Message from Jackie Lee RN sent at 7/20/2023  4:04 PM CDT -----  Angelito Dickson,    This pt need a right lower extremity US at some point next week, whichever location is convenient for her. Orders are in.    Thanks!

## 2023-08-08 NOTE — TELEPHONE ENCOUNTER
One month supply sent 6/1/23.  Due for annual wellness exam  Last 5/3/22    [employer] message sent to patient asking her to schedule appointment.    Monse Hopson RN

## 2023-08-13 NOTE — TELEPHONE ENCOUNTER
LCV:6/14/2023  [facility]  BP above protocol- FYI to clinic  RF 90 day    BP Readings from Last 3 Encounters:   06/28/23 (!) 150/89   06/21/23 96/68   06/14/23 (!) 144/83

## 2023-08-22 NOTE — NURSING NOTE
Is the patient currently in the state of [place]? YES    Visit mode:VIDEO    If the visit is dropped, the patient can be reconnected by: VIDEO VISIT: Text to cell phone:   Telephone Information:   Mobile [phone]       Will anyone else be joining the visit? NO  (If patient encounters technical issues they should call [phone]    How would you like to obtain your AVS? MyChart    Are changes needed to the allergy or medication list? No  Pt uses Flonase prn.    Reason for visit: CLEMENT NOYOLA

## 2023-08-22 NOTE — LETTER
"8/22/2023       RE: Maribel Yang  [address]  [facility] [address]     Dear Colleague,    [facility] you [facility] your patient, [facility]ibel Yang[facility]. Please see a copy of my visit note below.    Virtual Visit Details    Type of service:  Video Visit   See note    LUNG NODULE & INTERVENTIONAL PULMONARY CLINIC  [facility] SURGERY [place], [facility]   VIDEO VISIT    Maribel Yang MRN# [id number]   Age: 72 year old YOB: 1950     Reason for Consultation: Lung Nodule    Requesting Physician: Referred Self, MD  No address on file       The patient has been notified of following:   \"This video visit will be conducted via a call between you and your physician/provider. We have found that certain health care needs can be provided without the need for an in-person physical exam.  This service lets us provide the care you need with a video conversation.  If a prescription is necessary we can send it directly to your pharmacy.  If lab work is needed we can place an order for that and you can then stop by our lab to have the test done at a later time.  Video visits are billed at different rates depending on your insurance coverage.  Please reach out to your insurance provider with any questions.  If during the course of the call the physician/provider feels a video visit is not appropriate, you will not be charged for this service.\"  Patient has given verbal consent for Video visit? Yes  How would you like to obtain your AVS? Please refer to rooming staff note  Patient would like the video invitation sent by: Please refer to rooming staff note   Will anyone else be joining your video visit? Please refer to rooming staff note       Video-Visit Details     Type of service:  Video Visit  Video Start Time: 300  Video End Time: 320  Provider Name: Nii JAVIER" Mike LEONARD, MHA   Originating Location (pt. Location): Home  Provider Location: Off campus   Distant Location (provider location): Home/Clinic  Platform used for Video Visit: Mikaela/Linda    Assessment and Plan:    1. Established solitary pulmonary lung nodule(s). Given the characteristics on current/previous imaging and risk factors; I would classify this to be Intermediate (6-65%) risk for cancer. RUL nodule/lesion likely scar tissue from previous RUL nodule radiation back in 2014. There is slight interval enlargement but continues to look like scar tissue in my opinion. Repeat CT in 6mo    Billing: I spent a total of 45min spent on date of encounter which includes prep time, visit with the patient and post visit work including documentation and nursing communication     Nii Mckeon MD, MHA  Associate Professor of Medicine  Section of Interventional Pulmonology   Division of Pulmonary, Allergy, Critical Care and Sleep Medicine   [facility]  Pager: [phone]   Office: [phone]  Email: [email or web address]    Syl Thompson RN   Interventional Pulmonary Care Coordinator   Office: [phone]  Email: [email or web address]     Regan Turcios  Interventional Pulmonary Surgery Scheduler   Office: [phone]  Email: [email or web address]         History:     Maribel Yang is a 72 year old female with sig h/o for COPD, CHF, MDD, CAD, NSCLC who is here for evaluation/followup of Lung Nodule.    - No new resp sx or complaints. Denies dyspnea or cough.   - Here for evaluation of enlarging pulmonary nodules  - Personal hx of cancer: anal cancer, NSCLC, basal cell cancer   - Family hx of cancer: no lung canecer  - Exposure hx: Denies asbestos or radon exposure   - Tobacco hx: Current Smoker, 1ppd >20yrs. Smokes 1/2 cig once a week    - My interpretation of the images relevant for this visit includes: nodules   - My interpretation of the PFT's relevant for this visit  includes: None     Culprit Nodule(s):   Increased size of spiculated pulmonary nodule in the right upper lobe measuring up to  3.0 x 1.5 cm, previously 2.6 x 1.3 cm    Other active medical problems include:   - has NSCLC 2014 s/p radiation RUL. In remission.    - had anal cancer 5yrs ago s/p chemoradiation. In remission    - had skin cancer basal cell   - had ESRD s/p transplant. No active issues.          Past Medical History:      Past Medical History:   Diagnosis Date    Abnormal coagulation profile     p [id number] heterozygote     Age-related osteoporosis without current pathological fracture 06/22/2019    Anemia     Antiplatelet or antithrombotic long-term use     ASCUS with positive high risk HPV 2007, 2015    + HPV 56, 54,& 6, colp - TAL III, Leep =TAL II    Basal cell carcinoma     Congestive heart failure, unspecified HF chronicity, unspecified heart failure type (H) 06/22/2021    COPD (chronic obstructive pulmonary disease) (H)     Depressive disorder 07/2015    Heart attack (H)     History of blood transfusion     Hypertension     Immunosuppressed status (H)     due meds    Kidney replaced by transplant 09/2004    Living donor recipient,  Rejection 7/2005    LSIL (low grade squamous intraepithelial lesion) on Pap smear 04/2013    +HPV 33 or 45, 61      PAD (peripheral artery disease) (H)     PONV (postoperative nausea and vomiting)     Squamous cell lung cancer (H)     Thrombosis of leg 1967    Unspecified disorder of kidney and ureter     X-linked dominant Alport's syndrome.             Past Surgical History:      Past Surgical History:   Procedure Laterality Date    BIOPSY      Kidney, Lung, Breast    BIOPSY ANAL N/A 03/14/2018    Procedure: BIOPSY ANAL;;  Surgeon: Shabbir Leo MD;  [facility]    BYPASS GRAFT FEMORAL FEMORAL Bilateral 04/25/2021    Procedure: Axplantation infected graft, femoral to femoral bypass with cadaveric artery, bilateral SATORIUS MUSCLE FLAP CREATION, evacuation of  absece, vacuum closure;  Surgeon: Raven Lewis MD;  [facility]    COLONOSCOPY      COLONOSCOPY N/A 08/09/2017    Procedure: COMBINED COLONOSCOPY, SINGLE OR MULTIPLE BIOPSY/POLYPECTOMY BY BIOPSY;;  Surgeon: Sushil Hyatt MD;  Location: [facility] GI    COLONOSCOPY N/A 7/28/2022    Procedure: COLONOSCOPY, WITH BIOPSY;  Surgeon: Moise Corcoran MD;  Location:  GI    COLPOSCOPY,LOOP ELECTRD CERVIX EXCIS  03/11/2008    TAL II    CONIZATION LEEP  07/17/2013    Procedure: CONIZATION LEEP;;  Surgeon: Liliana Renteria MD;  [facility]    CONIZATION LEEP N/A 08/17/2016    Procedure: CONIZATION LEEP;  Surgeon: Liliana Renteria MD;  [facility]    CV CORONARY ANGIOGRAM N/A 04/06/2021    Procedure: CV CORONARY ANGIOGRAM;  Surgeon: Sincere Rosas MD;  Location:  HEART CARDIAC CATH LAB    CV CORONARY ANGIOGRAM N/A 04/25/2021    Procedure: Coronary Angiogram;  Surgeon: Sincere Rosas MD;  Location:  HEART CARDIAC CATH LAB    CV PCI STENT DRUG ELUTING N/A 04/06/2021    Procedure: Percutaneous Coronary Intervention Stent Drug Eluting;  Surgeon: Sincere Rosas MD;  Location:  HEART CARDIAC CATH LAB    ENDOVASCULAR REPAIR ANEURYSM AORTOILIAC Bilateral 04/06/2021    Procedure: Endovascular Abdominal Aortic Aneurysm Repair with Aortouniiliac Device and Femoral-Femoral Artery Bypass with [id number] Artegraft;  Surgeon: Abena Ferrara MD;  Location:  OR    ESOPHAGOSCOPY, GASTROSCOPY, DUODENOSCOPY (EGD), COMBINED N/A 2/2/2023    Procedure: ESOPHAGOGASTRODUODENOSCOPY, WITH BIOPSY;  Surgeon: Yazan Heredia MD;  Location:  GI    EXAM UNDER ANESTHESIA ANUS  07/15/2014    Procedure: EXAM UNDER ANESTHESIA ANUS;  Surgeon: Radha Musa MD;  [facility]    EXAM UNDER ANESTHESIA ANUS N/A 03/14/2018    Procedure: EXAM UNDER ANESTHESIA ANUS;  Anal Exam Under Anesthesia With Excision of anal lesion, proctoscopy;  Surgeon: Shabbir Leo,  MD;  [facility]: [facility]    EYE SURGERY      GENITOURINARY SURGERY      IR OR ANGIOGRAM  04/06/2021    IRRIGATION AND DEBRIDEMENT GROIN Right 04/24/2021    Procedure: IRRIGATION AND DEBRIDEMENT, INGUINAL REGION, I & D PF RIGHT GROIN;  Surgeon: Raven Lewis MD;  [facility]    IRRIGATION AND DEBRIDEMENT GROIN N/A 04/26/2021    Procedure: IRRIGATION AND DEBRIDEMENT, INGUINAL REGION, PLACEMENT OF VERAFLO WOUND VAC, WOUND WASHOUT,;  Surgeon: Raven Lewis MD;  [facility]    LASER CO2 EXCISE VULVA WIDE LOCAL  07/15/2014    Procedure: LASER CO2 EXCISE VULVA WIDE LOCAL;  Surgeon: Liliana Renteria MD;  [facility]    LASER CO2 VAGINA  07/17/2013    Procedure: LASER CO2 VAGINA;;  Surgeon: Liliana Renteria MD;  [facility]    LASER CO2 VAGINA N/A 09/25/2018    Procedure: LASER CO2 VAGINA;  Exam Under Anesthesia, CO2 Laser Ablation of Upper Vagina and Cervix;  Surgeon: Pati Garcia MD;  [facility]    MICROSCOPY ANAL  07/17/2013    Procedure: MICROSCOPY ANAL;  Anal Microscopy,  EUA vagina,Colposcopy Of Vagina And Vulva, Vaginal Biopsies, Omniguide Co2 Laser To Vagina and vulva, Loop Electrosurgical Excision Procedure To Cervix;  Surgeon: Radha Musa MD;  [facility]    MICROSCOPY ANAL  07/15/2014    Procedure: MICROSCOPY ANAL;  Surgeon: Radha Musa MD;  [facility]    PICC DOUBLE LUMEN PLACEMENT Right 04/30/2021    39cm, Basilic vein    PSEUDOANEURYSM REPAIR Right 6/27/2023    Procedure: RIGHT FEMORAL TO PROFUNDA FEMORIS ARTERY BYPASS WITH cadaveric femoral-popliteal artery, REPAIR OF RIGHT GROIN PSEUDOANEURYSM;  Surgeon: Abena Ferrara MD;  [facility]    TRANSESOPHAGEAL ECHOCARDIOGRAM INTRAOPERATIVE N/A 04/28/2021    Procedure: ECHOCARDIOGRAM, TRANSESOPHAGEAL, INTRAOPERATIVE;  Surgeon: GENERIC ANESTHESIA PROVIDER;  Location: [facility]    VASCULAR SURGERY      Thrombectomy    [facility] NONSPECIFIC PROCEDURE      Thrombectomy    [facility] NONSPECIFIC PROCEDURE  1955 and 1959     Bilater eye surgery - correction for crossed eyes    [facility] NONSPECIFIC PROCEDURE      oopherectomy L    [facility] NONSPECIFIC PROCEDURE      open kidney biopsy - L    [facility] TRANSPLANTATION OF KIDNEY  2004    recipient [facility]             Social History:     Social History     Tobacco Use    Smoking status: Some Days     Packs/day: 0.30     Years: 35.00     Pack years: 10.50     Types: Cigarettes     Start date: 1967     Last attempt to quit: 3/1/2021     Years since quittin.4    Smokeless tobacco: Never    Tobacco comments:     States smokes once in a while   Substance Use Topics    Alcohol use: Not Currently     Comment: rarely            Family History:     Family History   Problem Relation Age of Onset    Diabetes Father     Alcohol/Drug Father     Arthritis Father     Hypertension Father     Lipids Father         high cholesterol    Arthritis Mother     Diabetes Mother     Depression Mother     Heart Disease Mother     Neurologic Disorder Mother     Obesity Mother     Psychotic Disorder Mother     Thyroid Disease Mother     Hypertension Mother     Gynecology Sister         Precancerous cell removal from cervix at age 45    Depression Sister     Allergies Sister     Alcohol/Drug Sister     Neurologic Disorder Sister     Cerebrovascular Disease Paternal Grandmother     Diabetes Paternal Grandmother     Alcohol/Drug Son     Colon Polyps Sister     Breast Cancer Niece     Other Cancer Sister         Cervical    Obesity Sister     Depression Sister     Substance Abuse Son     Substance Abuse Sister             Depression Sister     Asthma Other     Colon Cancer No family hx of     Crohn's Disease No family hx of     Ulcerative Colitis No family hx of     Melanoma No family hx of     Skin Cancer No family hx of              Allergies:      Allergies   Allergen Reactions    Blood Transfusion Related (Informational Only) Other (See Comments)     Patient has a history of a clinically  significant antibody against RBC antigens.  A delay in compatible RBCs may occur.    Tramadol-Acetaminophen Nausea and Vomiting and Hives    Hydrocodone Nausea and Vomiting and Hives            Medications:     Current Outpatient Medications   Medication Sig    acetaminophen (TYLENOL) 325 MG tablet Take 2 tablets (650 mg) by mouth every 4 hours as needed for other (For optimal non-opioid multimodal pain management to improve pain control.)    albuterol (PROAIR HFA/PROVENTIL HFA/VENTOLIN HFA) 108 (90 Base) MCG/ACT inhaler Inhale 1-2 puffs into the lungs every 6 hours as needed for shortness of breath, wheezing or cough    ASPIRIN LOW DOSE 81 MG chewable tablet CHEW AND SWALLOW 1 TABLET (81 MG) BY MOUTH DAILY    atorvastatin (LIPITOR) 80 MG tablet Take 1 tablet (80 mg) by mouth daily    calcium carbonate-vitamin D (CALTRATE) 600-10 MG-MCG per tablet TAKE ONE TABLET BY MOUTH TWICE A DAY    clopidogrel (PLAVIX) 75 MG tablet Take 1 tablet (75 mg) by mouth daily    esomeprazole (NEXIUM) 20 MG DR capsule Take 1 capsule (20 mg) by mouth every morning (before breakfast) Take 30-60 minutes before eating.    fluticasone (FLONASE) 50 MCG/ACT nasal spray Spray 1 spray into both nostrils daily    hydrALAZINE (APRESOLINE) 10 MG tablet TAKE 1 TABLET (10 MG) BY MOUTH 3 TIMES DAILY    isosorbide mononitrate (IMDUR) 60 MG 24 hr tablet Take 1 tablet (60 mg) by mouth daily    Lactobacillus-Inulin ([facility]) CAPS TAKE ONE CAPSULE BY MOUTH ONCE DAILY (DUE FOR PHYSICAL IN MARCH)    lisinopril (ZESTRIL) 2.5 MG tablet TAKE 1 TABLET (2.5 MG) BY MOUTH DAILY    metoprolol tartrate (LOPRESSOR) 100 MG tablet Take 1 tablet (100 mg) by mouth daily    Nutritional Supplements (ENSURE CLEAR) LIQD Take 1 Bottle by mouth daily    predniSONE (DELTASONE) 5 MG tablet Take 1 tablet (5 mg) by mouth daily    psyllium (METAMUCIL/KONSYL) 58.6 % powder Take by mouth every morning    tacrolimus (GENERIC EQUIVALENT) 0.5 MG capsule Take 4  capsules (2 mg) by mouth 2 times daily    torsemide (DEMADEX) 10 MG tablet Take 1 tablet (10 mg) by mouth daily Take an additional 10 MG every other day as needed. Additional use as directed by [facility].    oxyCODONE (ROXICODONE) 5 MG tablet Take 1 tablet (5 mg) by mouth every 4 hours as needed for severe pain (Patient not taking: Reported on 7/10/2023)    sirolimus (GENERIC EQUIVALENT) 2 MG tablet HOLD for surgery (Patient not taking: Reported on 7/19/2023)    sirolimus (GENERIC EQUIVALENT) 2 MG tablet Take by mouth daily (Patient not taking: Reported on 7/19/2023)     No current facility-administered medications for this visit.            Review of Systems:     CONSTITUTIONAL: negative for fever, chills, change in weight  INTEGUMENTARY/SKIN: no rash or obvious new lesions  ENT/MOUTH: no sore throat, new sinus pain or nasal drainage  RESP: see interval history  CV: negative for chest pain, palpitations or peripheral edema  GI: no nausea, vomiting, change in stools  : no dysuria  MUSCULOSKELETAL: no myalgias, arthralgias  ENDOCRINE: blood sugars with adequate control  PSYCHIATRIC: mood stable  LYMPHATIC: no new lymphadenopathy  HEME: no bleeding or easy bruisability  NEURO: no numbness, weakness, headaches         Physical Exam:     Constitutional - looks well, in no apparent distress  Eyes - no redness or discharge  Respiratory -breathing appears comfortable.  No cough.  Skin - No appreciable discoloration or lesions (very limited exam)  Neurological - No apparent tremors. Speech fluent and articlate  Psychiatric - no signs of delirium or anxiety     Exam limited to that easily identified on a virtual visit. The rest of a comprehensive physical examination is deferred due to PHE (public health emergency) video visit restrictions.         Current Laboratory Data:   All laboratory and imaging data reviewed.          Latest Ref Rng & Units 4/26/2023     1:59 PM   PFT   FVC L 2.18    FEV1 L 1.31    FVC% % 89     FEV1% % 67                      Again, thank you for allowing me to participate in the care of your patient.      Sincerely,    Nii Mckeon MD

## 2023-08-22 NOTE — PROGRESS NOTES
"LUNG NODULE & INTERVENTIONAL PULMONARY CLINIC  [facility], [facility], [facility]   VIDEO VISIT    Maribel Yang MRN# [id number]   Age: 72 year old YOB: 1950     Reason for Consultation: Lung Nodule    Requesting Physician: Naima Arguelles MD  No address on file       The patient has been notified of following:   \"This video visit will be conducted via a call between you and your physician/provider. We have found that certain health care needs can be provided without the need for an in-person physical exam.  This service lets us provide the care you need with a video conversation.  If a prescription is necessary we can send it directly to your pharmacy.  If lab work is needed we can place an order for that and you can then stop by our lab to have the test done at a later time.  Video visits are billed at different rates depending on your insurance coverage.  Please reach out to your insurance provider with any questions.  If during the course of the call the physician/provider feels a video visit is not appropriate, you will not be charged for this service.\"  Patient has given verbal consent for Video visit? Yes  How would you like to obtain your AVS? Please refer to rooming staff note  Patient would like the video invitation sent by: Please refer to rooming staff note   Will anyone else be joining your video visit? Please refer to rooming staff note       Video-Visit Details     Type of service:  Video Visit  Video Start Time: 300  Video End Time: 320  Provider Name: Nii Mckeon MD, A   Originating Location (pt. Location): Home  Provider Location: Off campus   Distant Location (provider location): Home/Clinic  Platform used for Video Visit: [facility]    Assessment and Plan:    1. Established solitary pulmonary lung nodule(s). Given the characteristics on current/previous imaging and risk factors; I would classify this to be Intermediate (6-65%) " risk for cancer. RUL nodule/lesion likely scar tissue from previous RUL nodule radiation back in 2014. There is slight interval enlargement but continues to look like scar tissue in my opinion. Repeat CT in 6mo    Billing: I spent a total of 45min spent on date of encounter which includes prep time, visit with the patient and post visit work including documentation and nursing communication     Nii Mckeon MD, MHA  Associate Professor of Medicine  Section of Interventional Pulmonology   Division of Pulmonary, Allergy, Critical Care and Sleep Medicine   [facility]  Pager: [phone]   Office: [phone]  Email: [email or web address]    Syl Thompson RN   Interventional Pulmonary Care Coordinator   Office: [phone]  Email: [email or web address]     Regan Turcios  Interventional Pulmonary Surgery Scheduler   Office: [phone]  Email: [email or web address]         History:     Maribel Yang is a 72 year old female with sig h/o for COPD, CHF, MDD, CAD, NSCLC who is here for evaluation/followup of Lung Nodule.    - No new resp sx or complaints. Denies dyspnea or cough.   - Here for evaluation of enlarging pulmonary nodules  - Personal hx of cancer: anal cancer, NSCLC, basal cell cancer   - Family hx of cancer: no lung canecer  - Exposure hx: Denies asbestos or radon exposure   - Tobacco hx: Current Smoker, 1ppd >20yrs. Smokes 1/2 cig once a week    - My interpretation of the images relevant for this visit includes: nodules   - My interpretation of the PFT's relevant for this visit includes: None     Culprit Nodule(s):   Increased size of spiculated pulmonary nodule in the right upper lobe measuring up to  3.0 x 1.5 cm, previously 2.6 x 1.3 cm    Other active medical problems include:   - has NSCLC 2014 s/p radiation RUL. In remission.    - had anal cancer 5yrs ago s/p chemoradiation. In remission    - had skin cancer basal cell   - had ESRD s/p transplant. No  active issues.          Past Medical History:      Past Medical History:   Diagnosis Date    Abnormal coagulation profile     p [id number]>A heterozygote     Age-related osteoporosis without current pathological fracture 06/22/2019    Anemia     Antiplatelet or antithrombotic long-term use     ASCUS with positive high risk HPV 2007, 2015    + HPV 56, 54,& 6, colp - TAL III, Leep =TAL II    Basal cell carcinoma     Congestive heart failure, unspecified HF chronicity, unspecified heart failure type (H) 06/22/2021    COPD (chronic obstructive pulmonary disease) (H)     Depressive disorder 07/2015    Heart attack (H)     History of blood transfusion     Hypertension     Immunosuppressed status (H)     due meds    Kidney replaced by transplant 09/2004    Living donor recipient,  Rejection 7/2005    LSIL (low grade squamous intraepithelial lesion) on Pap smear 04/2013    +HPV 33 or 45, 61      PAD (peripheral artery disease) (H)     PONV (postoperative nausea and vomiting)     Squamous cell lung cancer (H)     Thrombosis of leg 1967    Unspecified disorder of kidney and ureter     X-linked dominant Alport's syndrome.             Past Surgical History:      Past Surgical History:   Procedure Laterality Date    BIOPSY      Kidney, Lung, Breast    BIOPSY ANAL N/A 03/14/2018    Procedure: BIOPSY ANAL;;  Surgeon: Shabbir Leo MD;  [facility]    BYPASS GRAFT FEMORAL FEMORAL Bilateral 04/25/2021    Procedure: Axplantation infected graft, femoral to femoral bypass with cadaveric artery, bilateral SATORIUS MUSCLE FLAP CREATION, evacuation of absece, vacuum closure;  Surgeon: Raven Lewis MD;  [facility]    COLONOSCOPY      COLONOSCOPY N/A 08/09/2017    Procedure: COMBINED COLONOSCOPY, SINGLE OR MULTIPLE BIOPSY/POLYPECTOMY BY BIOPSY;;  Surgeon: Sushil Hyatt MD;  [facility]    COLONOSCOPY N/A 7/28/2022    Procedure: COLONOSCOPY, WITH BIOPSY;  Surgeon: Moise Corcoran MD;  [facility]     COLPOSCOPY,LOOP ELECTRD CERVIX EXCIS  03/11/2008    TAL II    CONIZATION LEEP  07/17/2013    Procedure: CONIZATION LEEP;;  Surgeon: Liliana Renteria MD;  [facility]    CONIZATION LEEP N/A 08/17/2016    Procedure: CONIZATION LEEP;  Surgeon: Liliana Renteria MD;  [facility]    CV CORONARY ANGIOGRAM N/A 04/06/2021    Procedure: CV CORONARY ANGIOGRAM;  Surgeon: Sincere Rosas MD;  Location:  [facility]    CV CORONARY ANGIOGRAM N/A 04/25/2021    Procedure: Coronary Angiogram;  Surgeon: Sincere Rosas MD;  Location:  [facility]    CV PCI STENT DRUG ELUTING N/A 04/06/2021    Procedure: Percutaneous Coronary Intervention Stent Drug Eluting;  Surgeon: Sincere Rosas MD;  Location:  [facility]    ENDOVASCULAR REPAIR ANEURYSM AORTOILIAC Bilateral 04/06/2021    Procedure: Endovascular Abdominal Aortic Aneurysm Repair with Aortouniiliac Device and Femoral-Femoral Artery Bypass with 7ttC25op Artegraft;  Surgeon: Abena Ferrara MD;  [facility]    ESOPHAGOSCOPY, GASTROSCOPY, DUODENOSCOPY (EGD), COMBINED N/A 2/2/2023    Procedure: ESOPHAGOGASTRODUODENOSCOPY, WITH BIOPSY;  Surgeon: Yazan Heredia MD;  Location:  GI    EXAM UNDER ANESTHESIA ANUS  07/15/2014    Procedure: EXAM UNDER ANESTHESIA ANUS;  Surgeon: Radha Musa MD;  [facility]    EXAM UNDER ANESTHESIA ANUS N/A 03/14/2018    Procedure: EXAM UNDER ANESTHESIA ANUS;  Anal Exam Under Anesthesia With Excision of anal lesion, proctoscopy;  Surgeon: Shabbir Leo MD;  [facility]    EYE SURGERY      GENITOURINARY SURGERY      IR OR ANGIOGRAM  04/06/2021    IRRIGATION AND DEBRIDEMENT GROIN Right 04/24/2021    Procedure: IRRIGATION AND DEBRIDEMENT, INGUINAL REGION, I & D PF RIGHT GROIN;  Surgeon: Raven Lewis MD;  [facility]    IRRIGATION AND DEBRIDEMENT GROIN N/A 04/26/2021    Procedure: IRRIGATION AND DEBRIDEMENT, INGUINAL  REGION, PLACEMENT OF VERAFLO WOUND VAC, WOUND WASHOUT,;  Surgeon: Raven Lewis MD;  [facility]    LASER CO2 EXCISE VULVA WIDE LOCAL  07/15/2014    Procedure: LASER CO2 EXCISE VULVA WIDE LOCAL;  Surgeon: Liliana Renteria MD;  [facility]    LASER CO2 VAGINA  07/17/2013    Procedure: LASER CO2 VAGINA;;  Surgeon: Liliana Renteria MD;  [facility]    LASER CO2 VAGINA N/A 09/25/2018    Procedure: LASER CO2 VAGINA;  Exam Under Anesthesia, CO2 Laser Ablation of Upper Vagina and Cervix;  Surgeon: Pati Garcia MD;  [facility]    MICROSCOPY ANAL  07/17/2013    Procedure: MICROSCOPY ANAL;  Anal Microscopy,  EUA vagina,Colposcopy Of Vagina And Vulva, Vaginal Biopsies, Omniguide Co2 Laser To Vagina and vulva, Loop Electrosurgical Excision Procedure To Cervix;  Surgeon: Radha Musa MD;  [facility]    MICROSCOPY ANAL  07/15/2014    Procedure: MICROSCOPY ANAL;  Surgeon: Radha Musa MD;  [facility]    PICC DOUBLE LUMEN PLACEMENT Right 04/30/2021    39cm, Basilic vein    PSEUDOANEURYSM REPAIR Right 6/27/2023    Procedure: RIGHT FEMORAL TO PROFUNDA FEMORIS ARTERY BYPASS WITH cadaveric femoral-popliteal artery, REPAIR OF RIGHT GROIN PSEUDOANEURYSM;  Surgeon: Abena Ferrara MD;  [facility]    TRANSESOPHAGEAL ECHOCARDIOGRAM INTRAOPERATIVE N/A 04/28/2021    Procedure: ECHOCARDIOGRAM, TRANSESOPHAGEAL, INTRAOPERATIVE;  Surgeon: GENERIC ANESTHESIA PROVIDER;  [facility]    VASCULAR SURGERY      Thrombectomy    [facility] NONSPECIFIC PROCEDURE      Thrombectomy    [facility] NONSPECIFIC PROCEDURE  1955 and 1959    Bilater eye surgery - correction for crossed eyes    [facility] NONSPECIFIC PROCEDURE  1998    oopherectomy L    [facility] NONSPECIFIC PROCEDURE  1967    open kidney biopsy - L    [facility] TRANSPLANTATION OF KIDNEY  09/2004    recipient -- done at [facility]            Social History:     Social History     Tobacco Use    Smoking status: Some Days     Packs/day: 0.30     Years: 35.00     Pack  years: 10.50     Types: Cigarettes     Start date: 1967     Last attempt to quit: 3/1/2021     Years since quittin.4    Smokeless tobacco: Never    Tobacco comments:     States smokes once in a while   Substance Use Topics    Alcohol use: Not Currently     Comment: rarely            Family History:     Family History   Problem Relation Age of Onset    Diabetes Father     Alcohol/Drug Father     Arthritis Father     Hypertension Father     Lipids Father         high cholesterol    Arthritis Mother     Diabetes Mother     Depression Mother     Heart Disease Mother     Neurologic Disorder Mother     Obesity Mother     Psychotic Disorder Mother     Thyroid Disease Mother     Hypertension Mother     Gynecology Sister         Precancerous cell removal from cervix at age 45    Depression Sister     Allergies Sister     Alcohol/Drug Sister     Neurologic Disorder Sister     Cerebrovascular Disease Paternal Grandmother     Diabetes Paternal Grandmother     Alcohol/Drug Son     Colon Polyps Sister     Breast Cancer Niece     Other Cancer Sister         Cervical    Obesity Sister     Depression Sister     Substance Abuse Son     Substance Abuse Sister             Depression Sister     Asthma Other     Colon Cancer No family hx of     Crohn's Disease No family hx of     Ulcerative Colitis No family hx of     Melanoma No family hx of     Skin Cancer No family hx of              Allergies:      Allergies   Allergen Reactions    Blood Transfusion Related (Informational Only) Other (See Comments)     Patient has a history of a clinically significant antibody against RBC antigens.  A delay in compatible RBCs may occur.    Tramadol-Acetaminophen Nausea and Vomiting and Hives    Hydrocodone Nausea and Vomiting and Hives            Medications:     Current Outpatient Medications   Medication Sig    acetaminophen (TYLENOL) 325 MG tablet Take 2 tablets (650 mg) by mouth every 4 hours as needed for other (For optimal  non-opioid multimodal pain management to improve pain control.)    albuterol (PROAIR HFA/PROVENTIL HFA/VENTOLIN HFA) 108 (90 Base) MCG/ACT inhaler Inhale 1-2 puffs into the lungs every 6 hours as needed for shortness of breath, wheezing or cough    ASPIRIN LOW DOSE 81 MG chewable tablet CHEW AND SWALLOW 1 TABLET (81 MG) BY MOUTH DAILY    atorvastatin (LIPITOR) 80 MG tablet Take 1 tablet (80 mg) by mouth daily    calcium carbonate-vitamin D (CALTRATE) 600-10 MG-MCG per tablet TAKE ONE TABLET BY MOUTH TWICE A DAY    clopidogrel (PLAVIX) 75 MG tablet Take 1 tablet (75 mg) by mouth daily    esomeprazole (NEXIUM) 20 MG DR capsule Take 1 capsule (20 mg) by mouth every morning (before breakfast) Take 30-60 minutes before eating.    fluticasone (FLONASE) 50 MCG/ACT nasal spray Spray 1 spray into both nostrils daily    hydrALAZINE (APRESOLINE) 10 MG tablet TAKE 1 TABLET (10 MG) BY MOUTH 3 TIMES DAILY    isosorbide mononitrate (IMDUR) 60 MG 24 hr tablet Take 1 tablet (60 mg) by mouth daily    Lactobacillus-Inulin ([facility] DIGESTIVE [facility]) CAPS TAKE ONE CAPSULE BY MOUTH ONCE DAILY (DUE FOR PHYSICAL IN MARCH)    lisinopril (ZESTRIL) 2.5 MG tablet TAKE 1 TABLET (2.5 MG) BY MOUTH DAILY    metoprolol tartrate (LOPRESSOR) 100 MG tablet Take 1 tablet (100 mg) by mouth daily    Nutritional Supplements (ENSURE CLEAR) LIQD Take 1 Bottle by mouth daily    predniSONE (DELTASONE) 5 MG tablet Take 1 tablet (5 mg) by mouth daily    psyllium (METAMUCIL/KONSYL) 58.6 % powder Take by mouth every morning    tacrolimus (GENERIC EQUIVALENT) 0.5 MG capsule Take 4 capsules (2 mg) by mouth 2 times daily    torsemide (DEMADEX) 10 MG tablet Take 1 tablet (10 mg) by mouth daily Take an additional 10 MG every other day as needed. Additional use as directed by [facility].    oxyCODONE (ROXICODONE) 5 MG tablet Take 1 tablet (5 mg) by mouth every 4 hours as needed for severe pain (Patient not taking: Reported on 7/10/2023)    sirolimus (GENERIC  EQUIVALENT) 2 MG tablet HOLD for surgery (Patient not taking: Reported on 7/19/2023)    sirolimus (GENERIC EQUIVALENT) 2 MG tablet Take by mouth daily (Patient not taking: Reported on 7/19/2023)     No current facility-administered medications for this visit.            Review of Systems:     CONSTITUTIONAL: negative for fever, chills, change in weight  INTEGUMENTARY/SKIN: no rash or obvious new lesions  ENT/MOUTH: no sore throat, new sinus pain or nasal drainage  RESP: see interval history  CV: negative for chest pain, palpitations or peripheral edema  GI: no nausea, vomiting, change in stools  : no dysuria  MUSCULOSKELETAL: no myalgias, arthralgias  ENDOCRINE: blood sugars with adequate control  PSYCHIATRIC: mood stable  LYMPHATIC: no new lymphadenopathy  HEME: no bleeding or easy bruisability  NEURO: no numbness, weakness, headaches         Physical Exam:     Constitutional - looks well, in no apparent distress  Eyes - no redness or discharge  Respiratory -breathing appears comfortable.  No cough.  Skin - No appreciable discoloration or lesions (very limited exam)  Neurological - No apparent tremors. Speech fluent and articlate  Psychiatric - no signs of delirium or anxiety     Exam limited to that easily identified on a virtual visit. The rest of a comprehensive physical examination is deferred due to PHE (public health emergency) video visit restrictions.         Current Laboratory Data:   All laboratory and imaging data reviewed.          Latest Ref Rng & Units 4/26/2023     1:59 PM   PFT   FVC L 2.18    FEV1 L 1.31    FVC% % 89    FEV1% % 67

## 2023-08-29 PROBLEM — U07.1 INFECTION DUE TO 2019 NOVEL CORONAVIRUS: Status: ACTIVE | Noted: 2023-01-01

## 2023-08-29 NOTE — PROGRESS NOTES
Maribel is a 72 year old who is being evaluated via a billable telephone visit.      How would you like to obtain your AVS? MyChart            Assessment & Plan     Infection due to 2019 novel coronavirus  New infection with COVID - very high risk for complications  Complex medical history with history of kidney transplant  Currently on Tacrolimus so Paxlovid is contraindicated  Discussed GFR just at 30  Reviewed Remdisivir              [facility]   Maribel is a 72 year old, presenting for the following health issues:  No chief complaint on file.      HPI       COVID-19 Symptom Review  Symptoms started last night   How many days ago did these symptoms start? Started last night     Are any of the following symptoms significant for you?  New or worsening difficulty breathing? No  Worsening cough? Yes, I am coughing up mucus.  Fever or chills? Temp 100F  Headache: YES  Sore throat: No  Chest pain: No  Diarrhea: No  Body aches? YES  Home test positive 8/29/23    What treatments has patient tried? None at this time   Does patient live in a nursing home, group home, or shelter? No  Does patient have a way to get food/medications during quarantined? Lives with  who tested positive 2 days prior x 2 home tests.                Review of Systems   Constitutional, HEENT, cardiovascular, pulmonary, gi and gu systems are negative, except as otherwise noted.      Objective           Vitals:  No vitals were obtained today due to virtual visit.    Physical Exam   GENERAL: alert and no distress                Telephone -Visit Details    Telephone time 20 minutes     Originating Location (pt. Location): Home    Distant Location (provider location):  On-site

## 2023-08-29 NOTE — PROGRESS NOTES
CLINICAL NUTRITION SERVICES     Reason for Contact: Questions from Oncology Distress Screening  1. How concerned are you about your ability to eat? :  0  2. How concerned are you about unintended weight loss or your current weight? : 9    Action: RD called patient indicating reason for phone call. Left a VM with a return call back number.     Follow up: Wait for a return phone call.    Syl Javier RD,   [phone]

## 2023-08-30 PROBLEM — R11.2 NAUSEA AND VOMITING, UNSPECIFIED VOMITING TYPE: Status: ACTIVE | Noted: 2023-01-01

## 2023-08-30 PROBLEM — N18.9 ACUTE ON CHRONIC RENAL INSUFFICIENCY: Status: ACTIVE | Noted: 2023-01-01

## 2023-08-30 PROBLEM — E86.0 DEHYDRATION: Status: ACTIVE | Noted: 2023-01-01

## 2023-08-30 PROBLEM — J44.1 COPD EXACERBATION (H): Status: ACTIVE | Noted: 2023-01-01

## 2023-08-30 PROBLEM — R06.02 SOB (SHORTNESS OF BREATH): Status: ACTIVE | Noted: 2023-01-01

## 2023-08-30 PROBLEM — U07.1 COVID-19 VIRUS INFECTION: Status: ACTIVE | Noted: 2023-01-01

## 2023-08-30 PROBLEM — N28.9 ACUTE ON CHRONIC RENAL INSUFFICIENCY: Status: ACTIVE | Noted: 2023-01-01

## 2023-08-30 NOTE — ED TRIAGE NOTES
Pt positive for COVID yesterday, doctor told to come to ED. Pt has been throwing up, having headache, chills, fevers (T101), and shortness of breath. Hx kidney transplant     Triage Assessment       Row Name 08/30/23 1044       Triage Assessment (Adult)    Airway WDL WDL       Respiratory WDL    Respiratory WDL X;rhythm/pattern;cough    Rhythm/Pattern, Respiratory shortness of breath;dyspnea on exertion    Cough Frequency frequent    Cough Type dry       Skin Circulation/Temperature WDL    Skin Circulation/Temperature WDL WDL       Cardiac WDL    Cardiac WDL WDL       Peripheral/Neurovascular WDL    Peripheral Neurovascular WDL WDL       Cognitive/Neuro/Behavioral WDL    Cognitive/Neuro/Behavioral WDL WDL

## 2023-08-30 NOTE — H&P
[facility] [place]    History and Physical - Medicine Service, MAROON TEAM 1       Date of Admission:  8/30/2023    Assessment & Plan      Ms. Maribel Yang is a 72 year old female with medical history pertinent for HTN, CAD, inferior STEMI s/p RCA stent (4/7/21), PAD, AAA with prior fem-fem bypass s/p EVAR (4/6/21), tobacco use, COPD, SCC of R lung s/p SBRT (2014), anal canal carcinoma s/p chemoradiation (2018), ESRD s/p LDKT (2004), chronic anemia, history of DVT and osteoporosis. She presented to [facility] 8/30/2023 with 1 day of headache, nausea, vomiting, fever, chills and dyspnea at rest in the presence of positive COVID test. Also presented with PIERCE and orthopnea without CP found to have pericardial effusion.     # Confirmed COVID-19 infection   # AHRF likely multifactorial iso COVID, pericardial effusion  Patient with a 2 day history of N/V, fatigue, PIERCE, and non-productive cough. On admission, she required 2-3L NC to keep saturations >92%, but otherwise afebrile and HDS. She was found to have positive COVID test. No leukocytosis. CXR with no infiltrates. D-dimer elevated at 11.7, but likely iso COVID and less likely PE given no chest pain, tachycardia. Patient also mentioned at least 1 week history of PIERCE and orthopnea found to have increasing pericardial effusion when compared to prior measuring 1.6 cm, but no evidence of tamponade on TTE 8/30. Slightly reduced EF 50-55%, but normal ventricular function or significant valve abnormalities.   - COVID-19 special precautions, continuous pulse-ox  - Oxygen: continue current support with nasal cannula at 2 L/min; titrate to keep SpO2 between 90-96%  - Labs: Standard [facility] admission labs ordered (CBC with diff, CMP, INR, D-dimer, CRP).   - Imaging: chest x-ray ordered  - Breathing treatments: albuterol inhaler four times a day scheduled and PRN and long-acting anticholinergic daily; avoid nebulizers in favor of MDIs   - IV  fluids: not indicated at this time  - Antibiotics: not indicated   - COVID-Focused Medications: Dexamethasone 6 mg x 10 days or until hospital discharge, started on 08/30/2023 and Remdesivir x 5 days or until hospital discharge, started on 08/30/2023  - DVT Prophylaxis:         - At high risk of thrombotic complications due to COVID-19 (DDimer = 11, plan to recheck in AM given low likelihood PE)        - PROPHYLACTIC dosing: heparin 5000 units every 8 hours   - See work up below for pericardial effusion     #Pericardial effusion without evidence of cardiac tamponade  #Chronic heart failure with recovered ejection fraction (50-55%)  #CAD, inferior STEMI s/p RCA stent (4/7/21)  #HTN  Patient with history of at least one week of orthopnea and PIERCE, but no chest pain, palpitations. EKG with Q waves but similar from prior. BNP elevated 4,557 (previously 1871). She was found to have moderate size pericardial effusion measuring 1.6 cm without evidence of tamponade on TTE 8/30.   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 Baseline Cr 1.8-2.0. On admission, Cr elevated to 2.42. BUN 47.7, normal potassium. Likely secondary to prerenal issues with GI symptoms and poor oral intake with COVID. No acute indications for dialysis.   - Transplant Nephrology Consult, appreciate recs  - UA with cx reflex  - PTA Tacro  - Hold prednisone iso dex use  - BK, CMV, HSV, IgG per nephrology  - Start mag sulfate 400mg QD    # PAD/AAA  Followed by Vascular Surgery as outpatient. Now recently with recent repair of right femoral artery pseudoaneurysm, s/p EVAR with fem-fem bypass. AAA last measured as 4.6cm with recommendation of close follow up  "outpatient.     #RLE Edema  Patient with a few day history of increased swelling below the right ankle. Nontender to palpation, no rashes, good pedal pulses. Possibly iso recent PAD surgery by vascular, but will get doppler to r/o DVT.   - RLE Doppler US    #COPD  PFT's on 04/26/2023 show low FEV1 (0.87), FVC (2.18), FEV1:FVC ratio (60), and elevated RV with 35 year history of smoking tobacco are consistent with Obstructive lung disease  - PTA albuterol   - Combivent QID    #SCC s/p radiation  Patient with recent CT chest Aug/2023 increased size of right upper lobe nodularity adjusting evolving postradiation changed, but recommended continued close follow up.  Felt to be intermediate risk.  Followed by Pulmonary.     #Cachexia  - Nutrition consult         Diet:  Regular diet, Na restriction  DVT Prophylaxis: Heparin SQ  Villavicencio Catheter: Not present  Fluids: None   Lines: None     Cardiac Monitoring: None  Code Status:  Full code    Clinically Significant Risk Factors Present on Admission            # Hypomagnesemia: Lowest Mg = 1.3 mg/dL in last 2 days, will replace as needed     # Drug Induced Platelet Defect: home medication list includes an antiplatelet medication  # Acute Kidney Injury, unspecified: based on a >150% or 0.3 mg/dL increase in last creatinine compared to past 90 day average, will monitor renal function  # Hypertension: Noted on problem list  # Chronic heart failure with preserved ejection fraction: heart failure noted on problem list and last echo with EF >50%     # Cachexia: Estimated body mass index is 15.68 kg/m  as calculated from the following:    Height as of this encounter: 1.549 m (5' 1\").    Weight as of this encounter: 37.6 kg (83 lb).              Disposition Plan      Expected Discharge Date: 09/01/2023                The patient's care was discussed with the Attending Physician, Dr. Bourgeois .      Cassie Kwong MD  Medicine Service, [address] Of " [facility]  Securely message with [employer] (more info)  Text page via [facility] Paging/Directory   See signed in provider for up to date coverage information  ______________________________________________________________________    Chief Complaint   Nausea, vomiting, fever, and dyspnea at rest concerned for COVID infection    History is obtained from the patient    History of Present Illness   Ms. Maribel Yang is a 72 year old female with medical history pertinent for HTN, CAD, inferior STEMI s/p RCA stent (4/7/21), PAD, AAA with prior fem-fem bypass s/p EVAR (4/6/21), tobacco use, COPD, SCC of R lung s/p SBRT (2014), anal canal carcinoma s/p chemoradiation (2018), ESRD s/p LDKT (2004), chronic anemia, history of DVT and osteoporosis. She presented to [facility] 8/30/2023 with 1 day of headache, nausea, vomiting, fever, chills and dyspnea at rest in the presence of positive COVID test.    Maribel Yang is a 72 year old female who presents with 1 day worsening headache,  nausea, vomiting, and fever. Maribel typically does not leave her home, but was watching her grandson earlier during the week prior to the acute onset of symptoms. Following onset she took an at home COVID test that returned positive on 08/29. She attempted to take tylenol to alleviate the symptoms, but did not see much if any improvement prompting her to follow up with her primary care provider who suggested she come into the ED to seek further care for COVID. Also with at least one week of orthopnea, no chest pain or palpitations.       Past Medical History    Past Medical History:   Diagnosis Date    Abnormal coagulation profile     p [id number] heterozygote     Age-related osteoporosis without current pathological fracture 06/22/2019    Anemia     Antiplatelet or antithrombotic long-term use     ASCUS with positive high risk HPV 2007, 2015    + HPV 56, 54,& 6, colp - TAL III, Leep =TAL II    Basal cell carcinoma     Congestive heart failure,  unspecified HF chronicity, unspecified heart failure type (H) 06/22/2021    COPD (chronic obstructive pulmonary disease) (H)     Depressive disorder 07/2015    Heart attack (H)     History of blood transfusion     Hypertension     Immunosuppressed status (H)     due meds    Kidney replaced by transplant 09/2004    Living donor recipient,  Rejection 7/2005    LSIL (low grade squamous intraepithelial lesion) on Pap smear 04/2013    +HPV 33 or 45, 61      PAD (peripheral artery disease) (H)     PONV (postoperative nausea and vomiting)     Squamous cell lung cancer (H)     Thrombosis of leg 1967    Unspecified disorder of kidney and ureter     X-linked dominant Alport's syndrome.       Past Surgical History   Past Surgical History:   Procedure Laterality Date    BIOPSY      Kidney, Lung, Breast    BIOPSY ANAL N/A 03/14/2018    Procedure: BIOPSY ANAL;;  Surgeon: Shabbir Leo MD;  Location: [facility]    BYPASS GRAFT FEMORAL FEMORAL Bilateral 04/25/2021    Procedure: Axplantation infected graft, femoral to femoral bypass with cadaveric artery, bilateral SATORIUS MUSCLE FLAP CREATION, evacuation of absece, vacuum closure;  Surgeon: Raven Lewis MD;  Location: [facility] OR    COLONOSCOPY      COLONOSCOPY N/A 08/09/2017    Procedure: COMBINED COLONOSCOPY, SINGLE OR MULTIPLE BIOPSY/POLYPECTOMY BY BIOPSY;;  Surgeon: Sushil Hyatt MD;  Location:  GI    COLONOSCOPY N/A 7/28/2022    Procedure: COLONOSCOPY, WITH BIOPSY;  Surgeon: Moise Corcoran MD;  Location:  GI    COLPOSCOPY,LOOP ELECTRD CERVIX EXCIS  03/11/2008    TAL II    CONIZATION LEEP  07/17/2013    Procedure: CONIZATION LEEP;;  Surgeon: Liliana Renteria MD;  Location:  OR    CONIZATION LEEP N/A 08/17/2016    Procedure: CONIZATION LEEP;  Surgeon: Liliana Renteria MD;  [facility]    CV CORONARY ANGIOGRAM N/A 04/06/2021    Procedure: CV CORONARY ANGIOGRAM;  Surgeon: Sincere Rosas MD;  Location:  HEART CARDIAC CATH LAB    CV  CORONARY ANGIOGRAM N/A 04/25/2021    Procedure: Coronary Angiogram;  Surgeon: Sincere Rosas MD;  Location:  HEART CARDIAC CATH LAB    CV PCI STENT DRUG ELUTING N/A 04/06/2021    Procedure: Percutaneous Coronary Intervention Stent Drug Eluting;  Surgeon: Sincere Rosas MD;  Location:  HEART CARDIAC CATH LAB    ENDOVASCULAR REPAIR ANEURYSM AORTOILIAC Bilateral 04/06/2021    Procedure: Endovascular Abdominal Aortic Aneurysm Repair with Aortouniiliac Device and Femoral-Femoral Artery Bypass with [id number] Artegraft;  Surgeon: Abena Ferrara MD;  [facility]    ESOPHAGOSCOPY, GASTROSCOPY, DUODENOSCOPY (EGD), COMBINED N/A 2/2/2023    Procedure: ESOPHAGOGASTRODUODENOSCOPY, WITH BIOPSY;  Surgeon: Yazan Heredia MD;  Location:  GI    EXAM UNDER ANESTHESIA ANUS  07/15/2014    Procedure: EXAM UNDER ANESTHESIA ANUS;  Surgeon: Radha Musa MD;  [facility]    EXAM UNDER ANESTHESIA ANUS N/A 03/14/2018    Procedure: EXAM UNDER ANESTHESIA ANUS;  Anal Exam Under Anesthesia With Excision of anal lesion, proctoscopy;  Surgeon: Shabbir Leo MD;  Location: [facility]    EYE SURGERY      GENITOURINARY SURGERY      IR OR ANGIOGRAM  04/06/2021    IRRIGATION AND DEBRIDEMENT GROIN Right 04/24/2021    Procedure: IRRIGATION AND DEBRIDEMENT, INGUINAL REGION, I & D PF RIGHT GROIN;  Surgeon: Raven Lewis MD;  [facility]    IRRIGATION AND DEBRIDEMENT GROIN N/A 04/26/2021    Procedure: IRRIGATION AND DEBRIDEMENT, INGUINAL REGION, PLACEMENT OF VERAFLO WOUND VAC, WOUND WASHOUT,;  Surgeon: Raven Lewis MD;  [facility]    LASER CO2 EXCISE VULVA WIDE LOCAL  07/15/2014    Procedure: LASER CO2 EXCISE VULVA WIDE LOCAL;  Surgeon: Liliana Renteria MD;  [facility]    LASER CO2 VAGINA  07/17/2013    Procedure: LASER CO2 VAGINA;;  Surgeon: Liliana Renteria MD;  [facility]    LASER CO2 VAGINA N/A 09/25/2018    Procedure: LASER CO2 VAGINA;  Exam Under Anesthesia,  CO2 Laser Ablation of Upper Vagina and Cervix;  Surgeon: Pati Garcia MD;  [facility]    MICROSCOPY ANAL  07/17/2013    Procedure: MICROSCOPY ANAL;  Anal Microscopy,  EUA vagina,Colposcopy Of Vagina And Vulva, Vaginal Biopsies, Omniguide Co2 Laser To Vagina and vulva, Loop Electrosurgical Excision Procedure To Cervix;  Surgeon: Radha Musa MD;  [facility]: [facility]    MICROSCOPY ANAL  07/15/2014    Procedure: MICROSCOPY ANAL;  Surgeon: Radha Musa MD;  [facility]    PICC DOUBLE LUMEN PLACEMENT Right 04/30/2021    39cm, Basilic vein    PSEUDOANEURYSM REPAIR Right 6/27/2023    Procedure: RIGHT FEMORAL TO PROFUNDA FEMORIS ARTERY BYPASS WITH cadaveric femoral-popliteal artery, REPAIR OF RIGHT GROIN PSEUDOANEURYSM;  Surgeon: Abena Ferrara MD;  [facility]    TRANSESOPHAGEAL ECHOCARDIOGRAM INTRAOPERATIVE N/A 04/28/2021    Procedure: ECHOCARDIOGRAM, TRANSESOPHAGEAL, INTRAOPERATIVE;  Surgeon: GENERIC ANESTHESIA PROVIDER;  Location: [facility] OR    VASCULAR SURGERY      Thrombectomy    [facility] NONSPECIFIC PROCEDURE      Thrombectomy    [facility] NONSPECIFIC PROCEDURE  1955 and 1959    Bilater eye surgery - correction for crossed eyes    [facility] NONSPECIFIC PROCEDURE  1998    oopherectomy L    [facility] NONSPECIFIC PROCEDURE  1967    open kidney biopsy - L    ZZC TRANSPLANTATION OF KIDNEY  09/2004    recipient -- done at [facility]       Prior to Admission Medications   Prior to Admission Medications   Prescriptions Last Dose Informant Patient Reported? Taking?   ASPIRIN LOW DOSE 81 MG chewable tablet 8/30/2023 at 0900 Self No Yes   Sig: CHEW AND SWALLOW 1 TABLET (81 MG) BY MOUTH DAILY   Lactobacillus-Inulin ([facility] DIGESTIVE HEALTH) CAPS 8/30/2023 at 0900 Self No Yes   Sig: TAKE ONE CAPSULE BY MOUTH ONCE DAILY (DUE FOR PHYSICAL IN MARCH)   Nutritional Supplements (ENSURE CLEAR) LIQD 8/29/2023 at unknown Self No Yes   Sig: Take 1 Bottle by mouth daily   acetaminophen (TYLENOL) 325 MG tablet 8/29/2023 at pm Self  No Yes   Sig: Take 2 tablets (650 mg) by mouth every 4 hours as needed for other (For optimal non-opioid multimodal pain management to improve pain control.)   albuterol (PROAIR HFA/PROVENTIL HFA/VENTOLIN HFA) 108 (90 Base) MCG/ACT inhaler 8/30/2023 at 0800 Self No Yes   Sig: Inhale 1-2 puffs into the lungs every 6 hours as needed for shortness of breath, wheezing or cough   atorvastatin (LIPITOR) 80 MG tablet 8/30/2023 at 0900 Self No Yes   Sig: Take 1 tablet (80 mg) by mouth daily   calcium carbonate-vitamin D (CALTRATE) 600-10 MG-MCG per tablet 8/30/2023 at 0900 Self No Yes   Sig: TAKE ONE TABLET BY MOUTH TWICE A DAY   clopidogrel (PLAVIX) 75 MG tablet 8/30/2023 at 0900 Self No Yes   Sig: Take 1 tablet (75 mg) by mouth daily   esomeprazole (NEXIUM) 20 MG DR capsule 8/30/2023 at 0900 Self No Yes   Sig: Take 1 capsule (20 mg) by mouth every morning (before breakfast) Take 30-60 minutes before eating.   fluticasone (FLONASE) 50 MCG/ACT nasal spray More than a month at year ago Self No Yes   Sig: Spray 1 spray into both nostrils daily   hydrALAZINE (APRESOLINE) 10 MG tablet 8/30/2023 at 0900 Self No Yes   Sig: TAKE 1 TABLET (10 MG) BY MOUTH 3 TIMES DAILY   isosorbide mononitrate (IMDUR) 60 MG 24 hr tablet 8/30/2023 at 0900 Self No Yes   Sig: Take 1 tablet (60 mg) by mouth daily   lisinopril (ZESTRIL) 2.5 MG tablet 8/30/2023 at 0900 Self No Yes   Sig: TAKE 1 TABLET (2.5 MG) BY MOUTH DAILY   metoprolol tartrate (LOPRESSOR) 100 MG tablet 8/30/2023 at 0900 Self No Yes   Sig: Take 1 tablet (100 mg) by mouth daily   oxyCODONE (ROXICODONE) 5 MG tablet More than a month at unknown Self No Yes   Sig: Take 1 tablet (5 mg) by mouth every 4 hours as needed for severe pain   predniSONE (DELTASONE) 5 MG tablet 8/30/2023 at 0900 Self No Yes   Sig: Take 1 tablet (5 mg) by mouth daily   psyllium (METAMUCIL/KONSYL) 58.6 % powder Past Week at 2 days ago Self Yes Yes   Sig: Take by mouth every morning   sirolimus (GENERIC EQUIVALENT) 2  MG tablet  at HOLD SINCE JUNE Self No No   Sig: HOLD for surgery   Patient not taking: Reported on 7/19/2023   tacrolimus (GENERIC EQUIVALENT) 0.5 MG capsule 8/30/2023 at 0900 Self No Yes   Sig: Take 4 capsules (2 mg) by mouth 2 times daily   torsemide (DEMADEX) 10 MG tablet 8/30/2023 at 0900 Self No Yes   Sig: Take 1 tablet (10 mg) by mouth daily Take an additional 10 MG every other day as needed. Additional use as directed by [facility].      Facility-Administered Medications: None          Physical Exam   Vital Signs: Temp: 98.6  F (37  C) Temp src: Oral BP: 115/75 Pulse: 64   Resp: 13 SpO2: 94 % O2 Device: None (Room air)    Weight: 83 lbs 0 oz    GENERAL APPEARANCE: pleasant, cachectic appearing, resting comfortably in bed, no acute distress  HEENT: Normocephalic and atraumatic.   LUNGS: Clear to auscultation bilaterally without wheezes, rhonchi, or rales.  HEART: RRR with normal S1 and S2. Soft heart sounds. No murmurs, gallops, or rubs.  ABDOMEN: Soft, nontender, nondistended.   EXTREMITIES: right lower extremity edema from below the ankle, nontender without surrounding erythema. Peripheral pedal pulses palpated.   SKIN: No rashes on visible skin  NEUROLOGIC: Moving all extremities equally      Medical Decision Making         Data     I have personally reviewed the following data over the past 24 hrs:    4.8  \   10.8 (L)   / 252     138 98 47.7 (H) /  104 (H)   4.2 25 2.42 (H) \     ALT: 15 AST: 22 AP: 134 (H) TBILI: 0.2   ALB: 3.8 TOT PROTEIN: 6.6 LIPASE: 35     Trop: 53 (H) BNP: 4,557 (H)     TSH: 3.33 T4: N/A A1C: N/A     Procal: N/A CRP: 34.90 (H) Lactic Acid: 1.1       INR:  1.05 PTT:  29   D-dimer:  N/A Fibrinogen:  N/A

## 2023-08-30 NOTE — CONSULTS
[facility]  Transplant Nephrology Consult  Date of Admission:  8/30/2023  Today's Date: 08/30/2023  Requesting physician: Saúl Seaman MD    Recommendations:  - No acute indications for dialysis.  - Would continue on low dose tacrolimus for now.  Will check tacrolimus 12 hour trough with am labs (Ordered for you).  - If plan is to start dexamethasone, okay to hold prednisone for now.  - Recommend consulting Transplant ID.  - Would restart metoprolol and hydralazine, but hold lisinopril and torsemide with AMY.  - Recommend starting magnesium oxide 400 mg daily.    Assessment & Plan   # LDKT: Increased creatinine, likely secondary to prerenal issues with GI symptoms and poor oral intake with COVID.  No acute indications for dialysis.   - Baseline Creatinine: ~ 1.3-1.6   - Proteinuria: Not checked recently, but previously was moderate at ~ 2 grams   - Date DSA Last Checked: Apr/2018      Latest DSA: Not checked recently due to time from transplant   - BK Viremia: Not checked recently due to time from transplant   - Kidney Tx Biopsy: Feb 24, 2006; Result: Mild acute cellular-mediated rejection.  Mild interstitial fibrosis and tubular atrophy.             Jul 18, 2005; Result: Mild acute cellular-mediated rejection.  Mild interstitial fibrosis and tubular atrophy.             Jun 24, 2005; Result: Mild acute cellular-mediated rejection.  Mild interstitial fibrosis and tubular atrophy.             May 04, 2005; Result: Mild acute cellular-mediated rejection.  Mild interstitial fibrosis and tubular atrophy.    # Immunosuppression: Tacrolimus immediate release (goal 4-6) and Prednisone (dose 5 mg daily)   - Patient is in an immunosuppressed state and will continue to monitor for efficacy and toxicity of immunosuppression medications.   - Recent changed off sirolimus to tacrolimus due to surgery.   - Changes: Not at this time, but if starting dexamethasone, would hold  prednisone.   - Recommend checking tacrolimus 12 hour trough level with am labs.    # Infection Prophylaxis:   Last CD4 Level: 225 (Dec/2021)  - PJP: None    # Hypertension: Borderline control;  Goal BP: < 140/90 (Hospitalization goal)   - Volume status: Euvolemic   - Changes: Yes - Would recommend restarting metoprolol and hydralazine, but hold lisinopril for now with AMY.   - Can adjust up hydralazine as needed.    # Anemia in Chronic Renal Disease: Hgb: Stable      JONATHAN: No   - Iron studies: Low iron saturation    # Mineral Bone Disorder:   - Vitamin D; level: Not checked recently        On supplement: Yes  - Calcium; level: Normal        On supplement: Yes    # Electrolytes:   - Potassium; level: Normal        On supplement: No  - Magnesium; level: Low        On supplement: No; Recommend starting magnesium oxide 400 mg daily.  - Bicarbonate; level: Normal        On supplement: No    # COVID Infection: Patient with new onset symptoms, including respiratory and mild GI symptoms.  Okay oxygenation.  No overt pneumonia, at least yet, on CXR.  Appreciate Transplant ID input for plan.    # HFrEF: Last cardiac echo (Aug/2023) showed borderline LVEF ~ 50-55% with thinning and akinesis of the mid anteroseptal segments.  Also noted to have moderate pericardial effusion.    # CAD, s/p PCI: No anginal symptoms at present.    # PAD/AAA: Now recently with recent repair of right femoral artery pseudoaneurysm following previous infrarenal AAA, s/p EVAR with fem-fem bypass.  Followed by Vascular Surgery.    # COPD: Patient with some shortness of breath, but likely secondary to COVID, although no evidence of pneumonia on CXR at this time.  On inhalers.    # Lung Cancer: Patient with recent CT chest Aug/2023 increased size of right upper lobe nodularity adjusting evolving postradiation changed, but recommended continued close follow up.  Felt to be intermediate risk.  Followed by Pulmonary.    # EBV Viremia: Minimal EBV PCR of ~ 3K  with last check May/2022.  Could have higher EBV viremia in the setting of acute illness, but would not be treated.   - Recommend checking EBV PCR in ~ 4-6 weeks following this acute illness.    # Transplant History:  Etiology of Kidney Failure: Alport's syndrome  Tx: LDKT  Transplant: 9/20/2004 (Kidney)  Significant changes in immunosuppression:  Decreased immunosuppression due to recurrent cancers.  Significant transplant-related complications: EBV Viremia and Recurrent Skin Cancers    Recommendations were communicated to the primary team via this note.    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  Other significant PMH included CAD, HFrEF, AAA, PAD, lung cancer, COPD, anal cancer, recurrent skin cancer, h/o DVT and EBV viremia.  Patient previously had been on sirolimus and prednisone for immunosuppression, but had sirolimus changed to tacrolimus due to planned surgery in June for right femoral pseudoaneurysm repair.    Patient had been in her usual state of health until that last couple of days.  She reports her  developed COVID a few days ago.  Patient was asymptomatic at that time and she tested for COVID, which was negative.  Then, beginning yesterday, she started to develop a cough, some nausea and fever to 101.  She repeated her COVID testing, and was positive.  Due to worsening symptoms and shortness of breath, she presented to the ER and now admitted.  She denies any vomiting or diarrhea and nausea is better at this time.  No chest pain, but still short of breath and ongoing cough.  No leg swelling.  Hasn't been eating or drinking that well in the last day.    Review of Systems    The 10 point Review of Systems is negative other than noted in the HPI or here.    Past Medical History     I have reviewed this patient's medical history and updated it with pertinent information if needed.   Past Medical History:   Diagnosis Date    Abnormal coagulation profile     p [id number] heterozygote     Age-related osteoporosis without current pathological fracture 06/22/2019    Anemia     Antiplatelet or antithrombotic long-term use     ASCUS with positive high risk HPV 2007, 2015    + HPV 56, 54,& 6, colp - TAL III, Leep =TAL II    Basal cell carcinoma     Congestive heart failure, unspecified HF chronicity, unspecified heart failure type (H) 06/22/2021    COPD (chronic obstructive pulmonary disease) (H)     Depressive disorder 07/2015    Heart attack (H)     History of blood transfusion     Hypertension     Immunosuppressed status (H)     due meds    Kidney replaced by transplant 09/2004    Living donor recipient,  Rejection 7/2005    LSIL (low grade squamous intraepithelial lesion) on Pap smear 04/2013    +HPV 33 or 45, 61      PAD (peripheral artery disease) (H)     PONV (postoperative nausea and vomiting)     Squamous cell lung cancer (H)     Thrombosis of leg 1967    Unspecified disorder of kidney and ureter     X-linked dominant Alport's syndrome.       Past Surgical History   I have reviewed this patient's surgical history and updated it with pertinent information if needed.  Past Surgical History:   Procedure Laterality Date    BIOPSY      Kidney, Lung, Breast    BIOPSY ANAL N/A 03/14/2018    Procedure: BIOPSY ANAL;;  Surgeon: Shabbir Leo MD;  Location: [facility]    BYPASS GRAFT FEMORAL FEMORAL Bilateral 04/25/2021    Procedure: Axplantation infected graft, femoral to femoral bypass with cadaveric artery, bilateral SATORIUS MUSCLE FLAP CREATION, evacuation of absece, vacuum closure;  Surgeon: Raven Lewis MD;  Location: [facility] OR    COLONOSCOPY      COLONOSCOPY N/A 08/09/2017    Procedure: COMBINED COLONOSCOPY, SINGLE OR MULTIPLE BIOPSY/POLYPECTOMY BY BIOPSY;;  Surgeon: Sushil Hyatt MD;   Location:  GI    COLONOSCOPY N/A 7/28/2022    Procedure: COLONOSCOPY, WITH BIOPSY;  Surgeon: Moise Corcoran MD;  Location:  GI    COLPOSCOPY,LOOP ELECTRD CERVIX EXCIS  03/11/2008    TAL II    CONIZATION LEEP  07/17/2013    Procedure: CONIZATION LEEP;;  Surgeon: Liliana Renteria MD;  Location:  OR    CONIZATION LEEP N/A 08/17/2016    Procedure: CONIZATION LEEP;  Surgeon: Liliana Renteria MD;  [facility]    CV CORONARY ANGIOGRAM N/A 04/06/2021    Procedure: CV CORONARY ANGIOGRAM;  Surgeon: Sincere Rosas MD;  Location:  HEART CARDIAC CATH LAB    CV CORONARY ANGIOGRAM N/A 04/25/2021    Procedure: Coronary Angiogram;  Surgeon: Sincere Rosas MD;  Location:  HE[facility]ARD[facility] CATH LAB    CV PCI STENT DRUG ELUTING N/A 04/06/2021    Procedure: Percutaneous Coronary Intervention Stent Drug Eluting;  Surgeon: Sincere Rosas MD;  Location:  HEART CARDIAC CATH LAB    ENDOVASCULAR REPAIR ANEURYSM AORTOILIAC Bilateral 04/06/2021    Procedure: Endovascular Abdominal Aortic Aneurysm Repair with Aortouniiliac Device and Femoral-Femoral Artery Bypass with [id number] Artegraft;  Surgeon: Abena Ferrara MD;  Location:  OR    ESOPHAGOSCOPY, GASTROSCOPY, DUODENOSCOPY (EGD), COMBINED N/A 2/2/2023    Procedure: ESOPHAGOGASTRODUODENOSCOPY, WITH BIOPSY;  Surgeon: Yazan Heredia MD;  Location:  GI    EXAM UNDER ANESTHESIA ANUS  07/15/2014    Procedure: EXAM UNDER ANESTHESIA ANUS;  Surgeon: Radha Musa MD;  Location:  OR    EXAM UNDER ANESTHESIA ANUS N/A 03/14/2018    Procedure: EXAM UNDER ANESTHESIA ANUS;  Anal Exam Under Anesthesia With Excision of anal lesion, proctoscopy;  Surgeon: Shabbir Leo MD;  Location:  OR    EYE SURGERY      GENITOURINARY SURGERY      IR OR ANGIOGRAM  04/06/2021    IRRIGATION AND DEBRIDEMENT GROIN Right 04/24/2021    Procedure: IRRIGATION AND DEBRIDEMENT, INGUINAL REGION, I & D PF RIGHT GROIN;  Surgeon:  Raven Lewis MD;  [facility]: [facility]    IRRIGATION AND DEBRIDEMENT GROIN N/A 04/26/2021    Procedure: IRRIGATION AND DEBRIDEMENT, INGUINAL REGION, PLACEMENT OF VERAFLO WOUND VAC, WOUND WASHOUT,;  Surgeon: Raven Lewis MD;  [facility]: [facility]    LASER CO2 EXCISE VULVA WIDE LOCAL  07/15/2014    Procedure: LASER CO2 EXCISE VULVA WIDE LOCAL;  Surgeon: Liliana Renteria MD;  [facility]    LASER CO2 VAGINA  07/17/2013    Procedure: LASER CO2 VAGINA;;  Surgeon: Liliana Renteria MD;  [facility]    LASER CO2 VAGINA N/A 09/25/2018    Procedure: LASER CO2 VAGINA;  Exam Under Anesthesia, CO2 Laser Ablation of Upper Vagina and Cervix;  Surgeon: Pati Garcia MD;  [facility]: [facility]    MICROSCOPY ANAL  07/17/2013    Procedure: MICROSCOPY ANAL;  Anal Microscopy,  EUA vagina,Colposcopy Of Vagina And Vulva, Vaginal Biopsies, Omniguide Co2 Laser To Vagina and vulva, Loop Electrosurgical Excision Procedure To Cervix;  Surgeon: Radha Musa MD;  [facility]: [facility]    MICROSCOPY ANAL  07/15/2014    Procedure: MICROSCOPY ANAL;  Surgeon: Radha Musa MD;  [facility]: [facility]    PICC DOUBLE LUMEN PLACEMENT Right 04/30/2021    39cm, Basilic vein    PSEUDOANEURYSM REPAIR Right 6/27/2023    Procedure: RIGHT FEMORAL TO PROFUNDA FEMORIS ARTERY BYPASS WITH cadaveric femoral-popliteal artery, REPAIR OF RIGHT GROIN PSEUDOANEURYSM;  Surgeon: Abena Ferrara MD;  [facility]: [facility]    TRANSESOPHAGEAL ECHOCARDIOGRAM INTRAOPERATIVE N/A 04/28/2021    Procedure: ECHOCARDIOGRAM, TRANSESOPHAGEAL, INTRAOPERATIVE;  Surgeon: GENERIC ANESTHESIA PROVIDER;  Location: [facility]    VASCULAR SURGERY      Thrombectomy    C NONSPECIFIC PROCEDURE      Thrombectomy    C NONSPECIFIC PROCEDURE  1955 and 1959    Bilater eye surgery - correction for crossed eyes    [facility] NONSPECIFIC PROCEDURE  1998    oopherectomy L    [facility] NONSPECIFIC PROCEDURE  1967    open kidney biopsy - L    [facility] TRANSPLANTATION OF KIDNEY  09/2004    recipient -- done at [facility]        Family History   I have reviewed this patient's family history and updated it with pertinent information if needed.   Family History   Problem Relation Age of Onset    Diabetes Father     Alcohol/Drug Father     Arthritis Father     Hypertension Father     Lipids Father         high cholesterol    Arthritis Mother     Diabetes Mother     Depression Mother     Heart Disease Mother     Neurologic Disorder Mother     Obesity Mother     Psychotic Disorder Mother     Thyroid Disease Mother     Hypertension Mother     Gynecology Sister         Precancerous cell removal from cervix at age 45    Depression Sister     Allergies Sister     Alcohol/Drug Sister     Neurologic Disorder Sister     Cerebrovascular Disease Paternal Grandmother     Diabetes Paternal Grandmother     Alcohol/Drug Son     Colon Polyps Sister     Breast Cancer Niece     Other Cancer Sister         Cervical    Obesity Sister     Depression Sister     Substance Abuse Son     Substance Abuse Sister             Depression Sister     Asthma Other     Colon Cancer No family hx of     Crohn's Disease No family hx of     Ulcerative Colitis No family hx of     Melanoma No family hx of     Skin Cancer No family hx of        Social History   I have reviewed this patient's social history and updated it with pertinent information if needed. Maribel Yang  reports that she has been smoking cigarettes. She started smoking about 56 years ago. She has a 10.50 pack-year smoking history. She has never used smokeless tobacco. She reports that she does not currently use alcohol. She reports that she does not currently use drugs after having used the following drugs: Marijuana.    Allergies   Allergies   Allergen Reactions    Blood Transfusion Related (Informational Only) Other (See Comments)     Patient has a history of a clinically significant antibody against RBC antigens.  A delay in compatible RBCs may occur.    Tramadol-Acetaminophen Nausea and  "Vomiting and Hives    Hydrocodone Nausea and Vomiting and Hives     Prior to Admission Medications    remdesivir  200 mg Intravenous Once    Followed by    sodium chloride 0.9%  50 mL Intravenous Once    [START ON 2023] remdesivir  100 mg Intravenous Q24H    And    [START ON 2023] sodium chloride 0.9%  50 mL Intravenous Q24H    dexAMETHasone  6 mg Oral Daily    heparin ANTICOAGULANT  5,000 Units Subcutaneous Q12H    ipratropium-albuterol  2 puff Inhalation 4x Daily    sodium chloride (PF)  3 mL Intracatheter Q8H      - MEDICATION INSTRUCTIONS -      sodium chloride 100 mL/hr at 23 1328       Physical Exam   Temp  Av.6  F (36.4  C)  Min: 96.6  F (35.9  C)  Max: 98.6  F (37  C)      Pulse  Av.8  Min: 61  Max: 76 Resp  Av.1  Min: 13  Max: 28  SpO2  Av.9 %  Min: 94 %  Max: 100 %     BP (!) 178/100   Pulse 64   Temp 98.6  F (37  C) (Oral)   Resp 22   Ht 1.549 m (5' 1\")   Wt 37.6 kg (83 lb)   SpO2 99%   BMI 15.68 kg/m      Admit Weight: 37.6 kg (83 lb)     GENERAL APPEARANCE: alert, ill appearing with frequent coughing  HENT: mouth without ulcers or lesions  LYMPHATICS: no cervical lymphadenopathy appreciated  RESP: lungs clear to auscultation - no rales, rhonchi or wheezes  CV: regular rhythm, normal rate, no rub, no murmur  EDEMA: no LE edema bilaterally  ABDOMEN: soft, nondistended, nontender, bowel sounds normal  MS: extremities normal - no gross deformities noted, no evidence of inflammation in joints, no muscle tenderness  SKIN: no rash  TX KIDNEY: normal  DIALYSIS ACCESS:  None    Data   CMP  Recent Labs   Lab 23  1149      POTASSIUM 4.2   CHLORIDE 98   CO2 25   ANIONGAP 15   *   BUN 47.7*   CR 2.42*   GFRESTIMATED 21*   KAYKAY 9.4   MAG 1.3*   PROTTOTAL 6.6   ALBUMIN 3.8   BILITOTAL 0.2   ALKPHOS 134*   AST 22   ALT 15     CBC  Recent Labs   Lab 23  1149   HGB 10.8*   WBC 4.8   RBC 4.07   HCT 35.5   MCV 87   MCH 26.5   MCHC 30.4*   RDW 16.3*   PLT " 252     INR  Recent Labs   Lab 08/30/23  1149   INR 1.05   PTT 29     ABGNo lab results found in last 7 days.   Urine Studies  Recent Labs   Lab Test 03/02/23  1400 06/23/21  0505 06/13/21  1819   COLOR Light Yellow Straw Light Yellow   APPEARANCE Clear Clear Clear   URINEGLC Negative Negative Negative   URINEBILI Negative Negative Negative   URINEKETONE Negative Negative Negative   SG 1.011 1.010 1.011   UBLD Negative Negative Negative   URINEPH 5.5 6.5 5.5   PROTEIN 20* Negative 70*   NITRITE Negative Negative Negative   LEUKEST Negative Negative Negative   RBCU 2  --  1   WBCU 1  --  3     Recent Labs   Lab Test 05/03/22  1459 04/24/18  1812 01/31/17  1030 06/03/16  0900 10/22/15  1024 06/25/15  1007   UTPG 2.58* 1.04* 1.67* 0.76* 0.44* 0.31*     PTH  Recent Labs   Lab Test 06/12/19  1810   PTHI 89*     Iron Studies  Recent Labs   Lab Test 06/21/23  1133 01/25/23  1536 06/22/22  1551   IRON 40 45 30*    228* 250   IRONSAT 16 20 12*   AVERY 86 410* 82       IMAGING:  All imaging studies reviewed by me.

## 2023-08-30 NOTE — LETTER
Transition Communication Hand-off for Care Transitions to Next Level of Care Provider    Name: Maribel Yang  : 1950  MRN #: [id number]  Primary Care Provider: Lisa Martinez     [facility] [address] [facility]  [address]  [facility] [address]     Reason for Hospitalization:  Kidney replaced by transplant [Z94.0]  Dehydration [E86.0]  Pericardial effusion [I31.39]  SOB (shortness of breath) [R06.02]  COPD exacerbation (H) [J44.1]  Acute on chronic renal insufficiency [N28.9, N18.9]  Nausea and vomiting, unspecified vomiting type [R11.2]  COVID-19 virus infection [U07.1]  Admit Date/Time: 2023 10:49 AM  Discharge Date: 23  Payor Source: Payor: [facility] / Plan: [facility] MEDICARE / Product Type: HMO /     Readmission Assessment Measure (SEMAJ) Risk Score/category: 32%  Reason for Communication Hand-off Referral: Admission diagnoses: COPD  Discharge Plan: Home w/ family   Concern for non-adherence with plan of care:   Y/N no  Discharge Needs Assessment:  Needs      Flowsheet Row Most Recent Value   Equipment Currently Used at Home walker, rolling, shower chair  [only uses walker outside of house otherwise  helps her upstairs and brings things to her]          Already enrolled in Tele-monitoring program and name of program:  no  Follow-up specialty is recommended: Yes    Follow-up plan:    Future Appointments   Date Time Provider Department Center   2023  5:00 PM  LAB [facility]   2023  5:30 PM [facility]   2023  3:35 PM Hitesh See MD TXO [facility]   2024  1:00 PM  [facility] LAB DRAW ONL [facility]   2024  1:30 PM Angelica Ellison PA-C [facility]       Any outstanding tests or procedures:              Key Recommendations:  see follow-up & specialty appointments; recommend PCP appt within 1 week of discharge.     Cobly Sanders RN    AVS/Discharge Summary is the source of truth; this is a helpful guide for improved communication  of patient story

## 2023-08-30 NOTE — MEDICATION SCRIBE - ADMISSION MEDICATION HISTORY
Medication Scribe Admission Medication History     Admission medication history is complete. The information provided in this note is only as accurate as the sources available at the time of the update.     Medication reconciliation/reorder completed by provider prior to medication history? No     Information Source(s): Patient via in-person     Pertinent Information: Sirolimus on HOLD since June.      Changes made to PTA medication list:  Added: None  Deleted: None  Changed: None  Not taking: sirolimus on HOLD     Medication Affordability:  Not including over the counter (OTC) medications, was there a time in the past 3 months when you did not take your medications as prescribed because of cost?: No     Allergies reviewed with patient and updates made in EHR: yes    Medication History Completed By: Delia Childers 8/30/2023 2:48 PM    Prior to Admission medications    Medication Sig Last Dose Taking? Auth Provider Long Term End Date   acetaminophen (TYLENOL) 325 MG tablet Take 2 tablets (650 mg) by mouth every 4 hours as needed for other (For optimal non-opioid multimodal pain management to improve pain control.) 8/29/2023 at pm Yes Rob Warner MD     albuterol (PROAIR HFA/PROVENTIL HFA/VENTOLIN HFA) 108 (90 Base) MCG/ACT inhaler Inhale 1-2 puffs into the lungs every 6 hours as needed for shortness of breath, wheezing or cough 8/30/2023 at 0800 Yes Nii Mckeon MD Yes    ASPIRIN LOW DOSE 81 MG chewable tablet CHEW AND SWALLOW 1 TABLET (81 MG) BY MOUTH DAILY 8/30/2023 at 0900 Yes Lisa Martinez DO     atorvastatin (LIPITOR) 80 MG tablet Take 1 tablet (80 mg) by mouth daily 8/30/2023 at 0900 Yes Sincere Rosas MD Yes    calcium carbonate-vitamin D (CALTRATE) 600-10 MG-MCG per tablet TAKE ONE TABLET BY MOUTH TWICE A DAY 8/30/2023 at 0900 Yes Lisa Martinez DO     clopidogrel (PLAVIX) 75 MG tablet Take 1 tablet (75 mg) by mouth daily 8/30/2023 at 0900 Yes Sincere Rosas  MD Linda Yes    esomeprazole (NEXIUM) 20 MG DR capsule Take 1 capsule (20 mg) by mouth every morning (before breakfast) Take 30-60 minutes before eating. 8/30/2023 at 0900 Yes Lisa Martinez DO     fluticasone (FLONASE) 50 MCG/ACT nasal spray Spray 1 spray into both nostrils daily More than a month at year ago Yes Rob Warner MD     hydrALAZINE (APRESOLINE) 10 MG tablet TAKE 1 TABLET (10 MG) BY MOUTH 3 TIMES DAILY 8/30/2023 at 0900 Yes Tad Hernandez MD Yes    isosorbide mononitrate (IMDUR) 60 MG 24 hr tablet Take 1 tablet (60 mg) by mouth daily 8/30/2023 at 0900 Yes Marguerite Muñoz APRN CNP Yes    Lactobacillus-Inulin ([facility] DIGESTIVE [facility]) CAPS TAKE ONE CAPSULE BY MOUTH ONCE DAILY (DUE FOR PHYSICAL IN MARCH) 8/30/2023 at 0900 Yes Lisa Martinez DO     lisinopril (ZESTRIL) 2.5 MG tablet TAKE 1 TABLET (2.5 MG) BY MOUTH DAILY 8/30/2023 at 0900 Yes Tad Hernandez MD Yes    metoprolol tartrate (LOPRESSOR) 100 MG tablet Take 1 tablet (100 mg) by mouth daily 8/30/2023 at 0900 Yes Marguerite Muñoz APRN CNP Yes    Nutritional Supplements (ENSURE CLEAR) LIQD Take 1 Bottle by mouth daily 8/29/2023 at unknown Yes Lisa Martinez DO     oxyCODONE (ROXICODONE) 5 MG tablet Take 1 tablet (5 mg) by mouth every 4 hours as needed for severe pain More than a month at unknown Yes Vy Leung APRN CNP No    predniSONE (DELTASONE) 5 MG tablet Take 1 tablet (5 mg) by mouth daily 8/30/2023 at 0900 Yes Hitesh See MD Yes    psyllium (METAMUCIL/KONSYL) 58.6 % powder Take by mouth every morning Past Week at 2 days ago Yes Reported, Patient     tacrolimus (GENERIC EQUIVALENT) 0.5 MG capsule Take 4 capsules (2 mg) by mouth 2 times daily 8/30/2023 at 0900 Yes Hitesh See MD     torsemide (DEMADEX) 10 MG tablet Take 1 tablet (10 mg) by mouth daily Take an additional 10 MG every other day as needed. Additional use as directed by [facility]. 8/30/2023 at 0900 Yes Nicolette Vasquez APRN CNP  Yes    sirolimus (GENERIC EQUIVALENT) 2 MG tablet HOLD for surgery  Patient not taking: Reported on 7/19/2023  at HOLD SINCE JUNE SpongHitesh MD

## 2023-08-30 NOTE — ED PROVIDER NOTES
ED Provider Note  [facility]      History     Chief Complaint   Patient presents with    Covid Concern     HPI  Maribel Yang is a 72 year old female who presents emergency room with nausea vomiting fever chills headache positive COVID yesterday.  Patient history of kidney transplant 2004.  Has been going fairly well.  Patient noted over the last 24 hours she is developing headache fever chills nausea vomiting cough shortness of breath no significant pain no diarrhea.  No dysuric symptoms or rash.  Patient did home test yesterday that was positive for COVID.  Now presents after talking to her clinic reported to come to the ER for evaluation.  As noted she feels short of breath but no chest pain.  No history of cardiac disease no leg pain generalized achiness noted also.  Last Tylenol was yesterday.  Decreased p.o. intake now presents here feeling weak etc.  No asthma no wheezing no hemoptysis.  Sputum.    Past Medical History  Past Medical History:   Diagnosis Date    Abnormal coagulation profile     p [id number] heterozygote     Age-related osteoporosis without current pathological fracture 06/22/2019    Anemia     Antiplatelet or antithrombotic long-term use     ASCUS with positive high risk HPV 2007, 2015    + HPV 56, 54,& 6, colp - TAL III, Leep =TAL II    Basal cell carcinoma     Congestive heart failure, unspecified HF chronicity, unspecified heart failure type (H) 06/22/2021    COPD (chronic obstructive pulmonary disease) (H)     Depressive disorder 07/2015    Heart attack (H)     History of blood transfusion     Hypertension     Immunosuppressed status (H)     due meds    Kidney replaced by transplant 09/2004    Living donor recipient,  Rejection 7/2005    LSIL (low grade squamous intraepithelial lesion) on Pap smear 04/2013    +HPV 33 or 45, 61      PAD (peripheral artery disease) (H)     PONV (postoperative nausea and vomiting)     Squamous cell lung cancer (H)     Thrombosis  of leg 1967    Unspecified disorder of kidney and ureter     X-linked dominant Alport's syndrome.     Past Surgical History:   Procedure Laterality Date    BIOPSY      Kidney, Lung, Breast    BIOPSY ANAL N/A 03/14/2018    Procedure: BIOPSY ANAL;;  Surgeon: Shabbir Leo MD;  [facility]    BYPASS GRAFT FEMORAL FEMORAL Bilateral 04/25/2021    Procedure: Axplantation infected graft, femoral to femoral bypass with cadaveric artery, bilateral SATORIUS MUSCLE FLAP CREATION, evacuation of absece, vacuum closure;  Surgeon: Raven Lewis MD;  [facility]    COLONOSCOPY      COLONOSCOPY N/A 08/09/2017    Procedure: COMBINED COLONOSCOPY, SINGLE OR MULTIPLE BIOPSY/POLYPECTOMY BY BIOPSY;;  Surgeon: Sushil Hyatt MD;  [facility]    COLONOSCOPY N/A 7/28/2022    Procedure: COLONOSCOPY, WITH BIOPSY;  Surgeon: Moise Corcoran MD;  Location:  GI    COLPOSCOPY,LOOP ELECTRD CERVIX EXCIS  03/11/2008    TAL II    CONIZATION LEEP  07/17/2013    Procedure: CONIZATION LEEP;;  Surgeon: Liliana Renteria MD;  [facility]    CONIZATION LEEP N/A 08/17/2016    Procedure: CONIZATION LEEP;  Surgeon: Liliana Renteria MD;  [facility]    CV CORONARY ANGIOGRAM N/A 04/06/2021    Procedure: CV CORONARY ANGIOGRAM;  Surgeon: Sincere Rosas MD;  Location:  HE[facility] CARDIAC CATH LAB    CV CORONARY ANGIOGRAM N/A 04/25/2021    Procedure: Coronary Angiogram;  Surgeon: Sincere Rosas MD;  Location:  [facility]    CV PCI STENT DRUG ELUTING N/A 04/06/2021    Procedure: Percutaneous Coronary Intervention Stent Drug Eluting;  Surgeon: Sincere Rosas MD;  Location:  HE[facility]ARD[facility] CATH LAB    ENDOVASCULAR REPAIR ANEURYSM AORTOILIAC Bilateral 04/06/2021    Procedure: Endovascular Abdominal Aortic Aneurysm Repair with Aortouniiliac Device and Femoral-Femoral Artery Bypass with [id number] Artegraft;  Surgeon: Abena Ferrara MD;  [facility]    ESOPHAGOSCOPY,  GASTROSCOPY, DUODENOSCOPY (EGD), COMBINED N/A 2/2/2023    Procedure: ESOPHAGOGASTRODUODENOSCOPY, WITH BIOPSY;  Surgeon: Yazan Heredia MD;  Location: [facility]    EXAM UNDER ANESTHESIA ANUS  07/15/2014    Procedure: EXAM UNDER ANESTHESIA ANUS;  Surgeon: Radha Musa MD;  [facility]    EXAM UNDER ANESTHESIA ANUS N/A 03/14/2018    Procedure: EXAM UNDER ANESTHESIA ANUS;  Anal Exam Under Anesthesia With Excision of anal lesion, proctoscopy;  Surgeon: Shabbir Leo MD;  [facility]    EYE SURGERY      GENITOURINARY SURGERY      IR OR ANGIOGRAM  04/06/2021    IRRIGATION AND DEBRIDEMENT GROIN Right 04/24/2021    Procedure: IRRIGATION AND DEBRIDEMENT, INGUINAL REGION, I & D PF RIGHT GROIN;  Surgeon: Raven Lewis MD;  [facility]    IRRIGATION AND DEBRIDEMENT GROIN N/A 04/26/2021    Procedure: IRRIGATION AND DEBRIDEMENT, INGUINAL REGION, PLACEMENT OF VERAFLO WOUND VAC, WOUND WASHOUT,;  Surgeon: Raven Lewis MD;  [facility]    LASER CO2 EXCISE VULVA WIDE LOCAL  07/15/2014    Procedure: LASER CO2 EXCISE VULVA WIDE LOCAL;  Surgeon: Liliana Renteria MD;  [facility]    LASER CO2 VAGINA  07/17/2013    Procedure: LASER CO2 VAGINA;;  Surgeon: Liliana Renteria MD;  [facility]    LASER CO2 VAGINA N/A 09/25/2018    Procedure: LASER CO2 VAGINA;  Exam Under Anesthesia, CO2 Laser Ablation of Upper Vagina and Cervix;  Surgeon: Pati Garcia MD;  [facility]    MICROSCOPY ANAL  07/17/2013    Procedure: MICROSCOPY ANAL;  Anal Microscopy,  EUA vagina,Colposcopy Of Vagina And Vulva, Vaginal Biopsies, Omniguide Co2 Laser To Vagina and vulva, Loop Electrosurgical Excision Procedure To Cervix;  Surgeon: Radha Musa MD;  [facility]    MICROSCOPY ANAL  07/15/2014    Procedure: MICROSCOPY ANAL;  Surgeon: Radha Musa MD;  [facility]    PICC DOUBLE LUMEN PLACEMENT Right 04/30/2021    39cm, Basilic vein    PSEUDOANEURYSM REPAIR Right 6/27/2023     Procedure: RIGHT FEMORAL TO PROFUNDA FEMORIS ARTERY BYPASS WITH cadaveric femoral-popliteal artery, REPAIR OF RIGHT GROIN PSEUDOANEURYSM;  Surgeon: Abena Ferrara MD;  [facility]    TRANSESOPHAGEAL ECHOCARDIOGRAM INTRAOPERATIVE N/A 04/28/2021    Procedure: ECHOCARDIOGRAM, TRANSESOPHAGEAL, INTRAOPERATIVE;  Surgeon: GENERIC ANESTHESIA PROVIDER;  [facility]    VASCULAR SURGERY      Thrombectomy    [facility] NONSPECIFIC PROCEDURE      Thrombectomy    [facility] NONSPECIFIC PROCEDURE  1955 and 1959    Bilater eye surgery - correction for crossed eyes    [facility] NONSPECIFIC PROCEDURE  1998    oopherectomy L    [facility] NONSPECIFIC PROCEDURE  1967    open kidney biopsy - L    [facility] TRANSPLANTATION OF KIDNEY  09/2004    recipient [facility]      acetaminophen (TYLENOL) 325 MG tablet  albuterol (PROAIR HFA/PROVENTIL HFA/VENTOLIN HFA) 108 (90 Base) MCG/ACT inhaler  ASPIRIN LOW DOSE 81 MG chewable tablet  atorvastatin (LIPITOR) 80 MG tablet  calcium carbonate-vitamin D (CALTRATE) 600-10 MG-MCG per tablet  clopidogrel (PLAVIX) 75 MG tablet  esomeprazole (NEXIUM) 20 MG DR capsule  fluticasone (FLONASE) 50 MCG/ACT nasal spray  hydrALAZINE (APRESOLINE) 10 MG tablet  isosorbide mononitrate (IMDUR) 60 MG 24 hr tablet  Lactobacillus-Inulin ([facility] DIGESTIVE HEALTH) CAPS  lisinopril (ZESTRIL) 2.5 MG tablet  metoprolol tartrate (LOPRESSOR) 100 MG tablet  Nutritional Supplements (ENSURE CLEAR) LIQD  oxyCODONE (ROXICODONE) 5 MG tablet  predniSONE (DELTASONE) 5 MG tablet  psyllium (METAMUCIL/KONSYL) 58.6 % powder  tacrolimus (GENERIC EQUIVALENT) 0.5 MG capsule  torsemide (DEMADEX) 10 MG tablet  sirolimus (GENERIC EQUIVALENT) 2 MG tablet      Allergies   Allergen Reactions    Blood Transfusion Related (Informational Only) Other (See Comments)     Patient has a history of a clinically significant antibody against RBC antigens.  A delay in compatible RBCs may occur.    Tramadol-Acetaminophen Nausea and Vomiting and Hives    Hydrocodone Nausea  "and Vomiting and Hives     Family History  Family History   Problem Relation Age of Onset    Diabetes Father     Alcohol/Drug Father     Arthritis Father     Hypertension Father     Lipids Father         high cholesterol    Arthritis Mother     Diabetes Mother     Depression Mother     Heart Disease Mother     Neurologic Disorder Mother     Obesity Mother     Psychotic Disorder Mother     Thyroid Disease Mother     Hypertension Mother     Gynecology Sister         Precancerous cell removal from cervix at age 45    Depression Sister     Allergies Sister     Alcohol/Drug Sister     Neurologic Disorder Sister     Cerebrovascular Disease Paternal Grandmother     Diabetes Paternal Grandmother     Alcohol/Drug Son     Colon Polyps Sister     Breast Cancer Niece     Other Cancer Sister         Cervical    Obesity Sister     Depression Sister     Substance Abuse Son     Substance Abuse Sister             Depression Sister     Asthma Other     Colon Cancer No family hx of     Crohn's Disease No family hx of     Ulcerative Colitis No family hx of     Melanoma No family hx of     Skin Cancer No family hx of      Social History   Social History     Tobacco Use    Smoking status: Some Days     Packs/day: 0.30     Years: 35.00     Pack years: 10.50     Types: Cigarettes     Start date: 1967     Last attempt to quit: 3/1/2021     Years since quittin.4    Smokeless tobacco: Never    Tobacco comments:     States smokes once in a while   Vaping Use    Vaping Use: Never used   Substance Use Topics    Alcohol use: Not Currently     Comment: rarely    Drug use: Not Currently     Types: Marijuana     Comment: occasional use         A medically appropriate review of systems was performed with pertinent positives and negatives noted in the HPI, and all other systems negative.    Physical Exam   BP: 138/83  Pulse: 76  Temp: (!) 96.6  F (35.9  C)  Resp: 16  Height: 154.9 cm (5' 1\")  Weight: 37.6 kg (83 lb)  SpO2: 94 " %  Physical Exam  Vitals and nursing note reviewed.   Constitutional:       General: She is in acute distress.      Appearance: Normal appearance. She is well-developed. She is ill-appearing.      Comments: Patient vitally stable sats are 94% on room air.  Is tachypneic here in the ER generally weak appearing alert and orient x3 not confused.   HENT:      Head: Normocephalic and atraumatic.      Nose: No congestion.      Mouth/Throat:      Mouth: Mucous membranes are dry.      Pharynx: Oropharynx is clear.   Eyes:      General: No scleral icterus.     Extraocular Movements: Extraocular movements intact.      Conjunctiva/sclera: Conjunctivae normal.      Pupils: Pupils are equal, round, and reactive to light.   Cardiovascular:      Rate and Rhythm: Normal rate and regular rhythm.   Pulmonary:      Effort: Respiratory distress present.      Breath sounds: No stridor. No wheezing.   Abdominal:      General: Abdomen is flat. There is no distension.      Palpations: Abdomen is soft.      Tenderness: There is no abdominal tenderness. There is no guarding.      Comments: Abdomen is thin soft nontender no masses patient's kidney transplant is left lower quadrant without marked tenderness rebound or guarding   Musculoskeletal:         General: No swelling or tenderness.      Cervical back: Normal range of motion and neck supple. No rigidity or tenderness.      Comments: Negative Homans' sign   Skin:     General: Skin is warm and dry.      Capillary Refill: Capillary refill takes less than 2 seconds.      Coloration: Skin is pale. Skin is not jaundiced.      Findings: No rash.   Neurological:      General: No focal deficit present.      Mental Status: She is alert and oriented to person, place, and time. Mental status is at baseline.   Psychiatric:      Comments: Flat affect but not confused not delusional.           ED Course, Procedures, & Data      Records reviewed in epic.  Patient virtual visit yesterday with diagnosis  of COVID 19.  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  Medications reviewed etc.    In the ER we will repeat do a COVID along with influenza RSV to verify this also.  IV will be established blood culture be drawn labs EKG portable chest x-ray oxygen as needed IV fluids.    Patient had a portable chest x-ray done findings rule out of the known torturous aorta on both ascending and thoracic has have history of a stable aneurysm noted previously evaluated.  Chest x-ray when compared to previous seems similar without pneumothorax or other major findings.    EKG noted below  Lactic acid 1.1.  White count 4.8.  Hemoglobin 10.8.  Platelets noted to 52.  Sodium 138 potassium 4.2.  Bicarb is 25 gap 15 BUN 47 creatinine 2.42.  Glucose 104 alk phos 134.  BNP is 4500 troponin is 53.  Magnesium 1.3.  As noted documented COVID-positive.  Lipase is 35    Patient with history of kidney transplant with positive COVID with nausea vomiting slight increase in creatinine with nausea vomiting shortness of breath will plan to admit to medicine service I talked to them they will manage patient further if any antiviral type medications would be prescribed in a transplant patient.    I did get an echo also on the patient with the elevated BNP that I think maybe is more reflective of increasing renal dysfunction.  Patient's echo was pending prior to discussed with medicine regarding admission they will follow-up regarding this further evaluation notes EF is slightly decreased to 50 to 55% from previous there is also some comments on a anterior pericardial effusion but no sign of tamponade etc.  Medicine follow-up with the results at this point patient stable here in the ER.    Procedures            EKG Interpretation:      Interpreted by Saúl Seaman MD  Time reviewed: 1133  Symptoms at time of EKG: covid with sob    Rhythm: normal sinus   Rate: normal  Axis:  normal  Ectopy: none  Conduction: normal  ST Segments/ T Waves: Nonspecific ST-T wave changes  Q Waves: none  Comparison to prior: No old EKG available    Clinical Impression: nsr with nonspecific st changes noted                 Results for orders placed or performed during the hospital encounter of 08/30/23   XR Chest Port 1 View     Status: None    Narrative    EXAM: XR CHEST PORT 1 VIEW  8/30/2023 12:56 PM     HISTORY:  covid+ sob and n/v and weakness       COMPARISON:  CT 8/21/2023. Chest radiograph 3-23    TECHNIQUE: 2 portable AP semiupright views of the chest    FINDINGS:   The trachea is midline. The cardiomediastinal silhouette is within  normal limits. Atherosclerotic calcification of the aortic arch.  Redemonstrated tortuous aneurysmal thoracic aorta. Hyperinflated lungs  with upper lobe predominant emphysematous changes.. The pulmonary  vasculature is distinct. No appreciable pneumothorax or pleural  effusion. No focal airspace consolidation. No acute osseous  abnormality.      Impression    IMPRESSION: No acute airspace disease. Stable radiographic changes of  COPD. Enlarged tortuous descending thoracic aorta.    I have personally reviewed the examination and initial interpretation  and I agree with the findings.    MNOIK CRANE MD         SYSTEM ID:  [id number]   CRP inflammation     Status: Abnormal   Result Value Ref Range    CRP Inflammation 34.90 (H) <5.00 mg/L   INR     Status: Normal   Result Value Ref Range    INR 1.05 0.85 - 1.15   Partial thromboplastin time     Status: Normal   Result Value Ref Range    aPTT 29 22 - 38 Seconds   Comprehensive metabolic panel     Status: Abnormal   Result Value Ref Range    Sodium 138 136 - 145 mmol/L    Potassium 4.2 3.4 - 5.3 mmol/L    Chloride 98 98 - 107 mmol/L    Carbon Dioxide (CO2) 25 22 - 29 mmol/L    Anion Gap 15 7 - 15 mmol/L    Urea Nitrogen 47.7 (H) 8.0 - 23.0 mg/dL    Creatinine 2.42 (H) 0.51 - 0.95 mg/dL    Calcium 9.4 8.8 - 10.2 mg/dL     Glucose 104 (H) 70 - 99 mg/dL    Alkaline Phosphatase 134 (H) 35 - 104 U/L    AST 22 0 - 45 U/L    ALT 15 0 - 50 U/L    Protein Total 6.6 6.4 - 8.3 g/dL    Albumin 3.8 3.5 - 5.2 g/dL    Bilirubin Total 0.2 <=1.2 mg/dL    GFR Estimate 21 (L) >60 mL/min/1.73m2   Magnesium     Status: Abnormal   Result Value Ref Range    Magnesium 1.3 (L) 1.7 - 2.3 mg/dL   Lipase     Status: Normal   Result Value Ref Range    Lipase 35 13 - 60 U/L   Lactic acid whole blood     Status: Normal   Result Value Ref Range    Lactic Acid 1.1 0.7 - 2.0 mmol/L   Troponin T, High Sensitivity     Status: Abnormal   Result Value Ref Range    Troponin T, High Sensitivity 53 (H) <=14 ng/L   Nt probnp inpatient (BNP)     Status: Abnormal   Result Value Ref Range    N terminal Pro BNP Inpatient 4,557 (H) 0 - 900 pg/mL   TSH     Status: Normal   Result Value Ref Range    TSH 3.33 0.30 - 4.20 uIU/mL   Symptomatic Influenza A/B, RSV, & SARS-CoV2 PCR (COVID-19) Nasopharyngeal     Status: Abnormal    Specimen: Nasopharyngeal; Swab   Result Value Ref Range    Influenza A PCR Negative Negative    Influenza B PCR Negative Negative    RSV PCR Negative Negative    SARS CoV2 PCR Positive (A) Negative    Narrative    Testing was performed using the Xpert Xpress CoV2/Flu/RSV Assay on the [employer] GeneXpert Instrument. This test should be ordered for the detection of SARS-CoV-2, influenza, and RSV viruses in individuals who meet clinical and/or epidemiological criteria. Test performance is unknown in asymptomatic patients. This test is for in vitro diagnostic use under the FDA EUA for laboratories certified under CLIA to perform high or moderate complexity testing. This test has not been FDA cleared or approved. A negative result does not rule out the presence of PCR inhibitors in the specimen or target RNA in concentration below the limit of detection for the assay. If only one viral target is positive but coinfection with multiple targets is suspected, the sample  should be re-tested with another FDA cleared, approved, or authorized test, if coinfection would change clinical management. This test was validated by the [facility]. These laboratories are certified under the Clinical Laboratory Improvement Amendments of 1988 (CLIA-88) as qualified to perform high complexity laboratory testing.   CBC with platelets and differential     Status: Abnormal   Result Value Ref Range    WBC Count 4.8 4.0 - 11.0 10e3/uL    RBC Count 4.07 3.80 - 5.20 10e6/uL    Hemoglobin 10.8 (L) 11.7 - 15.7 g/dL    Hematocrit 35.5 35.0 - 47.0 %    MCV 87 78 - 100 fL    MCH 26.5 26.5 - 33.0 pg    MCHC 30.4 (L) 31.5 - 36.5 g/dL    RDW 16.3 (H) 10.0 - 15.0 %    Platelet Count 252 150 - 450 10e3/uL    % Neutrophils 83 %    % Lymphocytes 6 %    % Monocytes 10 %    % Eosinophils 0 %    % Basophils 0 %    % Immature Granulocytes 1 %    NRBCs per 100 WBC 0 <1 /100    Absolute Neutrophils 4.0 1.6 - 8.3 10e3/uL    Absolute Lymphocytes 0.3 (L) 0.8 - 5.3 10e3/uL    Absolute Monocytes 0.5 0.0 - 1.3 10e3/uL    Absolute Eosinophils 0.0 0.0 - 0.7 10e3/uL    Absolute Basophils 0.0 0.0 - 0.2 10e3/uL    Absolute Immature Granulocytes 0.0 <=0.4 10e3/uL    Absolute NRBCs 0.0 10e3/uL   D dimer quantitative     Status: Abnormal   Result Value Ref Range    D-Dimer Quantitative 11.73 (H) 0.00 - 0.50 ug/mL FEU    Narrative    This D-dimer assay is intended for use in conjunction with a clinical pretest probability assessment model to exclude pulmonary embolism (PE) and deep venous thrombosis (DVT) in outpatients suspected of PE or DVT. The cut-off value is 0.50 ug/mL FEU.   EKG 12-lead, tracing only     Status: None   Result Value Ref Range    Systolic Blood Pressure  mmHg    Diastolic Blood Pressure  mmHg    Ventricular Rate 73 BPM    Atrial Rate 73 BPM    AL Interval 144 ms    QRS Duration 76 ms     ms    QTc 445 ms    P Axis 21 degrees    R AXIS 63 degrees    T Axis -16 degrees    Interpretation ECG        Sinus rhythm  Abnormal QRS-T angle, consider primary T wave abnormality  Abnormal ECG    Unconfirmed report - interpretation of this ECG is computer generated - see medical record for final interpretation  Confirmed by - EMERGENCY ROOM, PHYSICIAN ([id number]),  ELIAS BEAUCHAMP ([id number]) on 2023 1:11:15 PM     Echocardiogram Complete     Status: None   Result Value Ref Range    LVEF  50-55% (borderline)     [facility]    [id number]  [id number]  [id number]  [id number]^DARYL^MILADYS^MADAY     [facility],[place]  Echocardiography Laboratory  [address] [place] [address]     Name: ERASMO MERINO  MRN: [id number]  : 1950  Study Date: 2023 01:34 PM  Age: 72 yrs  Gender: Female  Patient Location: [facility]  Reason For Study: SOB, CHF, Myocarditis  Ordering Physician: MILADYS BRITO  Performed By: Jeffry Barrientos     BSA: 1.3 m2  Height: 61 in  Weight: 83 lb  HR: 64  BP: 138/83 mmHg  ______________________________________________________________________________  Procedure  Complete Portable Echo Adult. Optison (NDC #4206-7035-86) given intravenously.  Patient was given 6 ml mixture of 3 ml Optison and 6 ml saline. 3 ml wasted.  ______________________________________________________________________________  Interpretation Summary  Left ventricular function is decreased. The ejection fraction is 50-55%  (borderline).  Global right ventricular function is normal. The right ventricle is normal  size.  No significant valvular abnormalities.  There is mild dilation at the level of the sinuses of Valsalva and ascending  aorta when indexed to BSA (both 3.1 cm, indexed value 2.4 cm/m2).  IVC diameter >2.1 cm collapsing <50% with sniff suggests a high RA pressure  estimated at 15 mmHg or greater.  There is a moderate-sized pericardial effusion anteriorly. The maximal depth  is 1.6 cm adjacent to the right AV groove. There is no evidence of RA/RV  diastolic collapse. There is organizing  material in the pericardial space.  Collectively, there are no high-risk signs of tamponade.  This study was compared with the study from 07/23/2022. No significant changes  upon direct visual comparison.  ______________________________________________________________________________  Left Ventricle  Left ventricular size is normal. Mild concentric wall thickening consistent  with left ventricular hypertrophy is present. Left ventricular function is  decreased. The ejection fraction is 50-55% (borderline). There is thinning and  akinesis of the mid inferoseptal and mid anteroseptal segments. Left  ventricular diastolic function is indeterminate.     Right Ventricle  Global right ventricular function is normal. The right ventricle is normal  size.     Atria  Both atria appear normal.     Mitral Valve  The mitral valve is normal. Trace mitral insufficiency is present.     Aortic Valve  The aortic valve is tricuspid. On Doppler interrogation, there is no  significant stenosis or regurgitation.     Tricuspid Valve  The tricuspid valve is normal. Trace tricuspid insufficiency is present.  Pulmonary artery systolic pressure cannot be assessed.     Pulmonic Valve  The valve leaflets are not well visualized. Trace pulmonic insufficiency is  present.     Vessels  There is mild dilation at the level of the sinuses of Valsalva and ascending  aorta when indexed to BSA (both 3.1 cm, indexed value 2.4 cm/m2). IVC diameter  >2.1 cm collapsing <50% with sniff suggests a high RA pressure estimated at 15  mmHg or greater.     Pericardium  There is a moderate-sized pericardial effusion anteriorly. The maximal depth  is 1.6 cm adjacent to the right AV groove. There is no evidence of RA/RV  diastolic collapse. There is organizing material in the pericardial space.  Collectively, there are no high-risk signs of tamponade.     Compared to Previous Study  This study was compared with the study from 07/23/2022 . No significant  changes upon  direct visual comparison.  ______________________________________________________________________________  MMode/2D Measurements & Calculations     IVSd: 1.00 cm  LVIDd: 4.7 cm  LVIDs: 3.5 cm  LVPWd: 0.94 cm  FS: 25.5 %  LV mass(C)d: 156.0 grams  LV mass(C)dI: 120.0 grams/m2  Ao root diam: 3.1 cm  asc Aorta Diam: 3.1 cm  LVOT diam: 2.0 cm  LVOT area: 3.1 cm2  Ao root diam Index (cm/m2): 2.4  asc Aorta Diam Index (cm/m2): 2.4  RWT: 0.40     Doppler Measurements & Calculations  MV E max nicolas: 52.4 cm/sec  MV A max nicolas: 61.8 cm/sec  MV E/A: 0.85  MV dec slope: 244.0 cm/sec2  MV dec time: 0.21 sec  Ao V2 max: 142.4 cm/sec  Ao max P.1 mmHg  Ao V2 mean: 106.2 cm/sec  Ao mean P.0 mmHg  Ao V2 VTI: 31.4 cm  DALIA(I,D): 1.8 cm2  DALIA(V,D): 1.9 cm2  LV V1 max PG: 3.1 mmHg  LV V1 max: 87.5 cm/sec  LV V1 VTI: 18.3 cm     SV(LVOT): 56.5 ml  SI(LVOT): 43.5 ml/m2  PA V2 max: 88.5 cm/sec  PA max PG: 3.1 mmHg  PA acc time: 0.13 sec  AV Nicolas Ratio (DI): 0.61  DALIA Index (cm2/m2): 1.4  E/E' av.7  Lateral E/e': 8.6  Medial E/e': 10.7     ______________________________________________________________________________  Report approved by: Saúl Kiran 2023 04:41 PM         CBC with platelets differential     Status: Abnormal    Narrative    The following orders were created for panel order CBC with platelets differential.  Procedure                               Abnormality         Status                     ---------                               -----------         ------                     CBC with platelets and d...[[id number]]  Abnormal            Final result                 Please view results for these tests on the individual orders.   PRA Donor Specific Antibody *Canceled*     Status: None ()    Narrative    The following orders were created for panel order PRA Donor Specific Antibody.  Procedure                               Abnormality         Status                     ---------                                -----------         ------                       Please view results for these tests on the individual orders.   PRA Donor Specific Antibody     Status: None (In process)    Narrative    The following orders were created for panel order PRA Donor Specific Antibody.  Procedure                               Abnormality         Status                     ---------                               -----------         ------                     PRA Donor Specific Antibody[[id number]]                      In process                   Please view results for these tests on the individual orders.     Medications   lidocaine 1 % 0.1-1 mL (has no administration in time range)   lidocaine (LMX4) cream (has no administration in time range)   sodium chloride (PF) 0.9% PF flush 3 mL (3 mLs Intracatheter Not Given 8/30/23 1934)   sodium chloride (PF) 0.9% PF flush 3 mL (has no administration in time range)   ondansetron (ZOFRAN) injection 4 mg (4 mg Intravenous $Given 8/30/23 1151)   sodium chloride 0.9% infusion ( Intravenous Rate/Dose Verify 8/30/23 1823)   lidocaine 1 % 0.1-1 mL (has no administration in time range)   lidocaine (LMX4) cream (has no administration in time range)   sodium chloride (PF) 0.9% PF flush 3 mL (3 mLs Intracatheter Not Given 8/30/23 1934)   sodium chloride (PF) 0.9% PF flush 3 mL (has no administration in time range)   melatonin tablet 1 mg (has no administration in time range)   ondansetron (ZOFRAN ODT) ODT tab 4 mg (has no administration in time range)     Or   ondansetron (ZOFRAN) injection 4 mg (has no administration in time range)   senna-docusate (SENOKOT-S/PERICOLACE) 8.6-50 MG per tablet 1 tablet (has no administration in time range)     Or   senna-docusate (SENOKOT-S/PERICOLACE) 8.6-50 MG per tablet 2 tablet (has no administration in time range)   Medication instructions: Do NOT use nebulized medications (has no administration in time range)   dexAMETHasone (DECADRON) tablet 6 mg (6 mg Oral $Given  8/30/23 1824)   remdesivir 200 mg in sodium chloride 0.9 % 160 mL intermittent infusion (200 mg Intravenous $New Bag 8/30/23 1823)     Followed by   0.9% sodium chloride BOLUS (50 mLs Intravenous $New Bag 8/30/23 1929)   remdesivir 100 mg in sodium chloride 0.9 % 80 mL intermittent infusion (has no administration in time range)     And   0.9% sodium chloride BOLUS (has no administration in time range)   ipratropium-albuterol (COMBIVENT RESPIMAT) inhaler 2 puff (2 puffs Inhalation Not Given 8/30/23 1920)   heparin ANTICOAGULANT injection 5,000 Units (5,000 Units Subcutaneous $Given 8/30/23 1928)   albuterol (PROVENTIL HFA/VENTOLIN HFA) inhaler (has no administration in time range)   aspirin (ASA) chewable tablet 81 mg (has no administration in time range)   calcium carbonate-vitamin D (CALTRATE) 600-10 MG-MCG per tablet 1 tablet (1 tablet Oral $Given 8/30/23 1928)   clopidogrel (PLAVIX) tablet 75 mg (has no administration in time range)   pantoprazole (PROTONIX) EC tablet 40 mg (has no administration in time range)   hydrALAZINE (APRESOLINE) tablet 10 mg (has no administration in time range)   isosorbide mononitrate (IMDUR) 24 hr tablet 60 mg (has no administration in time range)   metoprolol tartrate (LOPRESSOR) tablet 100 mg (has no administration in time range)   oxyCODONE (ROXICODONE) tablet 5 mg (has no administration in time range)   predniSONE (DELTASONE) tablet 5 mg (has no administration in time range)   tacrolimus (GENERIC EQUIVALENT) capsule 2 mg (2 mg Oral $Given 8/30/23 1928)   torsemide (DEMADEX) tablet 10 mg (has no administration in time range)   0.9% sodium chloride BOLUS (0 mLs Intravenous Stopped 8/30/23 1352)   perflutren diluted 1mL to 2mL with saline (OPTISON) diluted injection 6 mL (6 mLs Intravenous $Given 8/30/23 1412)     Labs Ordered and Resulted from Time of ED Arrival to Time of ED Departure   CRP INFLAMMATION - Abnormal       Result Value    CRP Inflammation 34.90 (*)    COMPREHENSIVE  METABOLIC PANEL - Abnormal    Sodium 138      Potassium 4.2      Chloride 98      Carbon Dioxide (CO2) 25      Anion Gap 15      Urea Nitrogen 47.7 (*)     Creatinine 2.42 (*)     Calcium 9.4      Glucose 104 (*)     Alkaline Phosphatase 134 (*)     AST 22      ALT 15      Protein Total 6.6      Albumin 3.8      Bilirubin Total 0.2      GFR Estimate 21 (*)    MAGNESIUM - Abnormal    Magnesium 1.3 (*)    TROPONIN T, HIGH SENSITIVITY - Abnormal    Troponin T, High Sensitivity 53 (*)    NT PROBNP INPATIENT - Abnormal    N terminal Pro BNP Inpatient 4,557 (*)    INFLUENZA A/B, RSV, & SARS-COV2 PCR - Abnormal    Influenza A PCR Negative      Influenza B PCR Negative      RSV PCR Negative      SARS CoV2 PCR Positive (*)    CBC WITH PLATELETS AND DIFFERENTIAL - Abnormal    WBC Count 4.8      RBC Count 4.07      Hemoglobin 10.8 (*)     Hematocrit 35.5      MCV 87      MCH 26.5      MCHC 30.4 (*)     RDW 16.3 (*)     Platelet Count 252      % Neutrophils 83      % Lymphocytes 6      % Monocytes 10      % Eosinophils 0      % Basophils 0      % Immature Granulocytes 1      NRBCs per 100 WBC 0      Absolute Neutrophils 4.0      Absolute Lymphocytes 0.3 (*)     Absolute Monocytes 0.5      Absolute Eosinophils 0.0      Absolute Basophils 0.0      Absolute Immature Granulocytes 0.0      Absolute NRBCs 0.0     D DIMER QUANTITATIVE - Abnormal    D-Dimer Quantitative 11.73 (*)    INR - Normal    INR 1.05     PARTIAL THROMBOPLASTIN TIME - Normal    aPTT 29     LIPASE - Normal    Lipase 35     LACTIC ACID WHOLE BLOOD - Normal    Lactic Acid 1.1     TSH - Normal    TSH 3.33     ROUTINE UA WITH MICROSCOPIC REFLEX TO CULTURE   EBV DNA PCR QUANTITATIVE WHOLE BLOOD   IGG   PRA DONOR SPECIFIC ANTIBODY   BLOOD CULTURE   BLOOD CULTURE   BK VIRUS QUANTITATIVE, PCR   CMV QUANTITATIVE, PCR   HERPES SIMPLEX VIRUS 1&2 PCR   PRA DONOR SPECIFIC ANTIBODY     Echocardiogram Complete   Final Result      XR Chest Port 1 View   Final Result   IMPRESSION:  No acute airspace disease. Stable radiographic changes of   COPD. Enlarged tortuous descending thoracic aorta.      I have personally reviewed the examination and initial interpretation   and I agree with the findings.      MONIK CRANE MD            SYSTEM ID:  [id number]             Critical care was not performed.     Medical Decision Making  The patient's presentation was of high complexity (an acute health issue posing potential threat to life or bodily function).    The patient's evaluation involved:  review of external note(s) from 3+ sources (see separate area of note for details)  review of 3+ test result(s) ordered prior to this encounter (see separate area of note for details)  ordering and/or review of 3+ test(s) in this encounter (see separate area of note for details)  discussion of management or test interpretation with another health professional (see separate area of note for details)    The patient's management necessitated high risk (a decision regarding hospitalization).    Assessment & Plan   72-year-old female history of kidney transplant 2004 believe who presents with positive COVID symptoms.  Verified COVID-patient is having cough shortness of breath some tachypnea although maintaining her saturations.  Nausea vomiting although took her transplant meds this morning.  Here in the ER patient is weak but blood pressure otherwise stable given IV fluids here in the ER for rehydration chest x-ray shows known aortic aneurysm ascending and thoracic appears unchanged without other major findings noted EKG done with sinus rhythm nonspecific changes patient's creatinine slightly elevated BNP was also up 4000 which may be reflective of renal function but I ordered an echo later the echo did show a slightly reduced EF to 50 to 55% there is a anterior pericardial effusion it appears to be similar when compared to previous according to radiology/cardiology report.  Patient here in the ER will be  admitted to medicine they will further decide if other anti-COVID type medications would be ordered and a kidney transplant patient.  Patient agrees with plan.       I have reviewed the nursing notes. I have reviewed the findings, diagnosis, plan and need for follow up with the patient.    New Prescriptions    No medications on file       Final diagnoses:   COVID-19 virus infection   Kidney replaced by transplant   SOB (shortness of breath)   COPD exacerbation (H)   Nausea and vomiting, unspecified vomiting type   Dehydration   Acute on chronic renal insufficiency   Pericardial effusion       Saúl Seaman  [facility] EMERGENCY DEPARTMENT  8/30/2023    This note was created at least in part by the use of dragon voice dictation system. Inadvertent typographical errors may still exist.  Saúl Seaman MD.  Patient evaluated in the emergency department during the COVID-19 pandemic period. Careful attention to patients safety was addressed throughout the evaluation. Evaluation and treatment management was initiated with disposition made efficiently and appropriate as possible to minimize any risk of potential exposure to patient during this evaluation.       Saúl Seaman MD  08/30/23 1944

## 2023-08-30 NOTE — MEDICATION SCRIBE - ADMISSION MEDICATION HISTORY
Medication Scribe Admission Medication History    Admission medication history is complete. The information provided in this note is only as accurate as the sources available at the time of the update.    Medication reconciliation/reorder completed by provider prior to medication history? Yes    Information Source(s): Patient via in-person    Pertinent Information: Sirolimus on HOLD since June.     Changes made to PTA medication list:  Added: None  Deleted: None  Changed: None  Not taking: sirolimus on HOLD    Medication Affordability:  Not including over the counter (OTC) medications, was there a time in the past 3 months when you did not take your medications as prescribed because of cost?: No    Allergies reviewed with patient and updates made in EHR: yes    Medication History Completed By: Delia Michelle 8/30/2023 2:46 PM    Prior to Admission medications    Medication Sig Last Dose Taking? Auth Provider Long Term End Date   acetaminophen (TYLENOL) 325 MG tablet Take 2 tablets (650 mg) by mouth every 4 hours as needed for other (For optimal non-opioid multimodal pain management to improve pain control.) 8/29/2023 at pm Yes Rob Warner MD     albuterol (PROAIR HFA/PROVENTIL HFA/VENTOLIN HFA) 108 (90 Base) MCG/ACT inhaler Inhale 1-2 puffs into the lungs every 6 hours as needed for shortness of breath, wheezing or cough 8/30/2023 at 0800 Yes Nii Mckeon MD Yes    ASPIRIN LOW DOSE 81 MG chewable tablet CHEW AND SWALLOW 1 TABLET (81 MG) BY MOUTH DAILY 8/30/2023 at 0900 Yes Lisa Martinez DO     atorvastatin (LIPITOR) 80 MG tablet Take 1 tablet (80 mg) by mouth daily 8/30/2023 at 0900 Yes Sincere Rosas MD Yes    calcium carbonate-vitamin D (CALTRATE) 600-10 MG-MCG per tablet TAKE ONE TABLET BY MOUTH TWICE A DAY 8/30/2023 at 0900 Yes Lisa Martinez DO     clopidogrel (PLAVIX) 75 MG tablet Take 1 tablet (75 mg) by mouth daily 8/30/2023 at 0900 Yes Sincere Rosas  MD Yes    esomeprazole (NEXIUM) 20 MG DR capsule Take 1 capsule (20 mg) by mouth every morning (before breakfast) Take 30-60 minutes before eating. 8/30/2023 at 0900 Yes Lisa Martinez DO     fluticasone (FLONASE) 50 MCG/ACT nasal spray Spray 1 spray into both nostrils daily More than a month at year ago Yes Rob Warner MD     hydrALAZINE (APRESOLINE) 10 MG tablet TAKE 1 TABLET (10 MG) BY MOUTH 3 TIMES DAILY 8/30/2023 at 0900 Yes Tad Hernandez MD Yes    isosorbide mononitrate (IMDUR) 60 MG 24 hr tablet Take 1 tablet (60 mg) by mouth daily 8/30/2023 at 0900 Yes Marguerite Muñoz APRN CNP Yes    Lactobacillus-Inulin ([facility] DIGESTIVE [facility]) CAPS TAKE ONE CAPSULE BY MOUTH ONCE DAILY (DUE FOR PHYSICAL IN MARCH) 8/30/2023 at 0900 Yes Lisa Martinez DO     lisinopril (ZESTRIL) 2.5 MG tablet TAKE 1 TABLET (2.5 MG) BY MOUTH DAILY 8/30/2023 at 0900 Yes Tad Hernandez MD Yes    metoprolol tartrate (LOPRESSOR) 100 MG tablet Take 1 tablet (100 mg) by mouth daily 8/30/2023 at 0900 Yes Marguerite Muñoz APRN CNP Yes    Nutritional Supplements (ENSURE CLEAR) LIQD Take 1 Bottle by mouth daily 8/29/2023 at unknown Yes Lisa Martinez DO     oxyCODONE (ROXICODONE) 5 MG tablet Take 1 tablet (5 mg) by mouth every 4 hours as needed for severe pain More than a month at unknown Yes Vy Leung APRN CNP No    predniSONE (DELTASONE) 5 MG tablet Take 1 tablet (5 mg) by mouth daily 8/30/2023 at 0900 Yes Hitesh See MD Yes    psyllium (METAMUCIL/KONSYL) 58.6 % powder Take by mouth every morning Past Week at 2 days ago Yes Reported, Patient     tacrolimus (GENERIC EQUIVALENT) 0.5 MG capsule Take 4 capsules (2 mg) by mouth 2 times daily 8/30/2023 at 0900 Yes Hitesh See MD     torsemide (DEMADEX) 10 MG tablet Take 1 tablet (10 mg) by mouth daily Take an additional 10 MG every other day as needed. Additional use as directed by [facility]. 8/30/2023 at 0900 Yes Nicolette Vasquez APRN CNP Yes     sirolimus (GENERIC EQUIVALENT) 2 MG tablet HOLD for surgery  Patient not taking: Reported on 7/19/2023  at HOLD SINCE JUNE SpongHitesh MD

## 2023-08-31 NOTE — CONSULTS
Cardiology Inpatient Consultation  August 31, 2023    Fellow attestation:  Patient seen and examined at bedside today with supervising attending Dr. Guerra. Patient is a 72 y.o F with h/o inferior STEMI s/p RCA stent, AAA with prior fem-fem bypass s/p EVAR here with COVID infection and found to have pericardial effusion. No features suggestive of tamponade on echo, hemodynamically stable. Does not require pericardiocentesis at this time. Can continue to monitor for now. Continue home DAPT, imdur, hydralazine and metoprolol, resume statin at discharge. Please page us if patient becomes hemodynamically unstable or develops chest pain/shortness of breath/other cardiac symptoms.    Rest as per excellent note by Mirian Muniz, MS4    Reason for Consult:  A cardiology consult was requested by Dr. Kwong from the medicine service to provide clinical guidance regarding increased pericardial effusion.    HPI:   Maribel Yang is a 72 year old female with medical history pertinent for HTN, CAD, inferior STEMI s/p RCA stent (4/7/21), HF with recovered EF, PAD, AAA with prior fem-fem bypass s/p EVAR (4/6/21), tobacco use, COPD, SCC of R lung s/p SBRT (2014), anal canal carcinoma s/p chemoradiation (2018), ESRD s/p LDKT (2004), chronic anemia, history of DVT and osteoporosis presenting with AHRF 2/2 COVID-19 infection now being evaluated for pericardial effusion.     Patient presented on 8/30/2023 with a 1 day history of headache, nausea, vomiting, fever, chills and dyspnea and tested positive for COVID in the ED. She has a history of chronic trace pericardial effusion. This effusion was noted to be increased (now mild-moderate) on echo in ED. No evidence of tamponade with EF 50-55%.     At the time of interview, the patient denies chest pain, orthopnea, PND, palpitations, lightheadedness, or syncope. She does report SOB and remains on 1L NC, however she states this is much improved from yesterday.     Review of  Systems:    Complete review of systems was performed and negative except per HPI.    PMH:  Past Medical History:   Diagnosis Date    Abnormal coagulation profile     p [id number] heterozygote     Age-related osteoporosis without current pathological fracture 06/22/2019    Anemia     Antiplatelet or antithrombotic long-term use     ASCUS with positive high risk HPV 2007, 2015    + HPV 56, 54,& 6, colp - TAL III, Leep =TAL II    Basal cell carcinoma     Congestive heart failure, unspecified HF chronicity, unspecified heart failure type (H) 06/22/2021    COPD (chronic obstructive pulmonary disease) (H)     Depressive disorder 07/2015    Heart attack (H)     History of blood transfusion     Hypertension     Immunosuppressed status (H)     due meds    Kidney replaced by transplant 09/2004    Living donor recipient,  Rejection 7/2005    LSIL (low grade squamous intraepithelial lesion) on Pap smear 04/2013    +HPV 33 or 45, 61      PAD (peripheral artery disease) (H)     PONV (postoperative nausea and vomiting)     Squamous cell lung cancer (H)     Thrombosis of leg 1967    Unspecified disorder of kidney and ureter     X-linked dominant Alport's syndrome.     Active Problems:  Patient Active Problem List    Diagnosis Date Noted    Dehydration 08/30/2023     Priority: Medium    SOB (shortness of breath) 08/30/2023     Priority: Medium    COPD exacerbation (H) 08/30/2023     Priority: Medium    Acute on chronic renal insufficiency 08/30/2023     Priority: Medium    Nausea and vomiting, unspecified vomiting type 08/30/2023     Priority: Medium    COVID-19 virus infection 08/30/2023     Priority: Medium    Infection due to 2019 novel coronavirus 08/29/2023     Priority: Medium    Pseudoaneurysm of femoral artery (H) 06/27/2023     Priority: Medium    Anemia, iron deficiency 06/30/2022     Priority: Medium    Mild protein-calorie malnutrition (H) 05/03/2022     Priority: Medium    Coronary artery disease involving native coronary  artery with angina pectoris with documented spasm (H) 06/25/2021     Priority: Medium     Occurred during hospitalization for AAA repair   Had RCA stent placed and started on Brillenta      Congestive heart failure, unspecified HF chronicity, unspecified heart failure type (H) 06/22/2021     Priority: Medium    Physical deconditioning 05/02/2021     Priority: Medium    Hematoma 04/23/2021     Priority: Medium    Abdominal aortic aneurysm (AAA) without rupture (H) 03/26/2021     Priority: Medium     Added automatically from request for surgery [id number]      Chronic kidney disease, stage 3 (H) 03/21/2021     Priority: Medium    Acute deep vein thrombosis (DVT) of proximal vein of lower extremity, unspecified laterality (H) 05/29/2020     Priority: Medium     Dx 3/2020  Rx for Eliquis but financial issues currently so on Lovenox.  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     Priority: Medium    Lumbago 10/16/2014     Priority: Medium    Squamous cell lung cancer (H)      Priority: Medium     Following with oncology   Had radiation therapy - 3 sessions  F/u CT completed  PET scheduled 6/16/14      Peripheral artery disease (H) 01/16/2014     Priority: Medium    YUNIOR III (vulvar intraepithelial neoplasia III) 09/03/2013     Priority: Medium    History of basal cell carcinoma 05/24/2013     Priority: Medium    Immunosuppression (H)      Priority: Medium     HTN, kidney transplant related      Priority: Medium    CARDIOVASCULAR SCREENING; LDL GOAL LESS THAN 100 10/31/2010     Priority: Medium    S/P LEEP of cervix 03/11/2008     Priority: Medium     1/07: + HPV 6 Low risk  10/07: ASCUS, + HPV 56, 54 & 6. 12/07: [place]: TAL II  3/11/08: LEEP - TAL II  8/08: ASCUS, ECC atypia, +HPV 82  9/08: [place] - Atypia  5/09: NIL pap, 11/09: NIL pap, neg HPV  4/13: LSIL, + HPV 33 or 45, 61. 5/15/13: [place] - VAIN II & III,TAL II. Referred to gyn onc.   6/20/13: [place] with gyn onc. Plan LEEP and CO2 laser to vagina, cx and vaginal vault. Reminder done  7/17/13: Anal Microscopy, EUA vagina,Colposcopy Of Vagina And Vulva, Vaginal Biopsies, Omniguide Co2 Laser To Vagina and vulva, Loop Electrosurgical Excision Procedure To Cervix- YUNIOR 1,2 & 3: AIN 2, VAIN 2, Leep bx benign Plan colp at gyn onc in 4 months. Reminder sent.  12/2/13: ASC H, + HPV 33/45, HSIL anal pap, YUNIOR 1 & 2, AIN 1 & 3. F/u deferred due to lung ca radiation  5/8/14: ASCUS, + HPV 33/45. 5/22/14: consult with Dr. Renteria, surgery planned in reminders  7/15/14: surgery-Exam under anesthesia, vulva and vaginal colposcopy, vulvar biopsy, CO2 laser of the vulva, vaginal pap smear - Pap LSIL, + HPV 33/45. Vulvar bx HSIL Plan repeat colp and pap with NP at gyn onc.  9/17/15: [place] with gyn onc. Due for repeat colp 3 -4 months. Tracking started.  5/26/16: Pap with vaginal Bx--Atyp/YUNIOR 1. Plan: Endometrial Bx  6/28/16: EMB, ECC--TAL 3. Plan: LEEP  8/17/16: LEEP--TAL 1, YUNIOR 1. Plan: Pap and colposcopy 6mo, due 2/17/17.  06/01/17 Pap--NIL/+HR HPV. [place]--negative. Plan: repeat colp and pap in 6mo.  12/14/17 [place]--visually normal, no Bx. Plan: colp & pap in 6mo.      Kidney replaced by transplant 10/21/2004     Priority: Medium     Living donor recipient  Alports syndrome      Other specified congenital anomalies 04/14/2004     Priority: Medium     Social History:  Social History     Tobacco Use    Smoking status: Some Days     Packs/day:  0.30     Years: 35.00     Pack years: 10.50     Types: Cigarettes     Start date: 1967     Last attempt to quit: 3/1/2021     Years since quittin.5    Smokeless tobacco: Never    Tobacco comments:     States smokes once in a while   Vaping Use    Vaping Use: Never used   Substance Use Topics    Alcohol use: Not Currently     Comment: rarely    Drug use: Not Currently     Types: Marijuana     Comment: occasional use     Family History:  Family History   Problem Relation Age of Onset    Diabetes Father     Alcohol/Drug Father     Arthritis Father     Hypertension Father     Lipids Father         high cholesterol    Arthritis Mother     Diabetes Mother     Depression Mother     Heart Disease Mother     Neurologic Disorder Mother     Obesity Mother     Psychotic Disorder Mother     Thyroid Disease Mother     Hypertension Mother     Gynecology Sister         Precancerous cell removal from cervix at age 45    Depression Sister     Allergies Sister     Alcohol/Drug Sister     Neurologic Disorder Sister     Cerebrovascular Disease Paternal Grandmother     Diabetes Paternal Grandmother     Alcohol/Drug Son     Colon Polyps Sister     Breast Cancer Niece     Other Cancer Sister         Cervical    Obesity Sister     Depression Sister     Substance Abuse Son     Substance Abuse Sister             Depression Sister     Asthma Other     Colon Cancer No family hx of     Crohn's Disease No family hx of     Ulcerative Colitis No family hx of     Melanoma No family hx of     Skin Cancer No family hx of        Medications:   remdesivir  100 mg Intravenous Q24H    And    sodium chloride 0.9%  50 mL Intravenous Q24H    aspirin  81 mg Oral Daily    calcium carbonate-vitamin D  1 tablet Oral BID    clopidogrel  75 mg Oral Daily    dexAMETHasone  6 mg Oral Daily    heparin ANTICOAGULANT  5,000 Units Subcutaneous Q12H    hydrALAZINE  10 mg Oral TID    ipratropium-albuterol  2 puff Inhalation 4x Daily    isosorbide  mononitrate  60 mg Oral Daily    magnesium oxide  400 mg Oral Daily    metoprolol tartrate  100 mg Oral Daily    pantoprazole  40 mg Oral Daily    [Held by provider] predniSONE  5 mg Oral Daily    sodium chloride (PF)  3 mL Intracatheter Q8H    sodium chloride (PF)  3 mL Intracatheter Q8H    tacrolimus  2 mg Oral BID IS    [Held by provider] torsemide  10 mg Oral Daily        - MEDICATION INSTRUCTIONS -      sodium chloride 100 mL/hr at 08/31/23 0058       Physical Exam:  Temp:  [96.6  F (35.9  C)-98.6  F (37  C)] 98.6  F (37  C)  Pulse:  [61-76] 68  Resp:  [13-28] 20  BP: (115-178)/() 163/95  FiO2 (%):  [2 %] 2 %  SpO2:  [94 %-100 %] 96 %    Intake/Output Summary (Last 24 hours) at 8/31/2023 0857  Last data filed at 8/31/2023 0300  Gross per 24 hour   Intake --   Output 350 ml   Net -350 ml     GEN: pleasant, no acute distress  HEENT: no icterus  CV: RRR, normal s1/s2, soft heart sounds, no murmurs/rubs/s3/s4 appreciated  CHEST: clear to ausculation bilaterally, no rales or wheezing  ABD: soft, non-tender, normal active bowel sounds  EXTR: normal pulses bilaterally. No clubbing, cyanosis or edema.   NEURO: alert oriented, speech fluent/appropriate, motor grossly nonfocal    Diagnostics:  All labs and imaging were reviewed, of note:    All laboratory data reviewed    Lab Results   Component Value Date    TROPI 0.015 06/22/2021    TROPI 10.126 () 04/27/2021    TROPI 12.468 () 04/27/2021    TROPI 20.972 () 04/26/2021    TROPI 29.517 () 04/26/2021       EKG:  Rhythm: Normal sinus   Rate: Normal  Axis: Normal  Ectopy: None  Conduction: Normal  ST Segments/ T Waves: No ST-T wave changes and No acute ischemic changes  Q Waves: II and III  Comparison to prior: Unchanged    Clinical Impression: normal EKG    Transthoracic echocardiogram:   Left ventricular function is decreased. The ejection fraction is 50-55%  (borderline).  Global right ventricular function is normal. The right ventricle is normal  size.  No  significant valvular abnormalities.  There is mild dilation at the level of the sinuses of Valsalva and ascending  aorta when indexed to BSA (both 3.1 cm, indexed value 2.4 cm/m2).  IVC diameter >2.1 cm collapsing <50% with sniff suggests a high RA pressure  estimated at 15 mmHg or greater.  There is a moderate-sized pericardial effusion anteriorly. The maximal depth  is 1.6 cm adjacent to the right AV groove. There is no evidence of RA/RV  diastolic collapse. There is organizing material in the pericardial space.  Collectively, there are no high-risk signs of tamponade.  This study was compared with the study from 07/23/2022. No significant changes  upon direct visual comparison.    Assessment and Recommendation:  Maribel Yang is a 72 year old female with medical history pertinent for HTN, CAD, inferior STEMI s/p RCA stent (4/7/21), PAD, AAA with prior fem-fem bypass s/p EVAR (4/6/21), tobacco use, COPD, SCC of R lung s/p SBRT (2014), anal canal carcinoma s/p chemoradiation (2018), ESRD s/p LDKT (2004), chronic anemia, history of DVT and osteoporosis being evaluated for increased pericardial effusion.     #Pericardial effusion  Slightly increased pericardial effusion from trace to mild-moderate now measured at 1.6 cm. No evidence of tamponade, EF 50-55%.    - Continue to monitor, no indication for further intervention at this time     #Chronic heart failure with recovered ejection fraction (50-55%)  #CAD, inferior STEMI s/p RCA stent (4/7/21)  #HTN  ECG on admission unchanged from prior. BNP elevated 4,557 (previously 1871). D-dimer elevated, LE ultrasound negative for DVT. Patient has been hypertensive this admission, currently holding PTA lisinopril and torsemide per nephro due to AMY.  - PTA Hydralazine  - PTA Imdur   - PTA Metoprolol   - PTA ASA, plavix  - Holding PTA Lisinopril, Torsemide iso AMY  - Strict I/O, daily weights    # Confirmed COVID-19 infection    Positive testing on 8/30. On remdesivir and  dexamethasone. Currently utilizing 1L NC.    - Per primary    # LDKT   #Immunosuppression   #AMY  S/p LDKT 2/2 Alports currently on tacrolimus and prednisone (currently holding). Increased creatinine (baseline 1.3-1.6), likely secondary to prerenal issues with GI symptoms and poor oral intake with COVID.  No acute indications for dialysis. Nephrology following.    - Per transplant nephrology     # PAD/AAA  Followed by Vascular Surgery as outpatient. Now recently with recent repair of right femoral artery pseudoaneurysm, s/p EVAR with fem-fem bypass. AAA last measured as 4.6cm with recommendation of close follow up outpatient.      #RLE Edema  #Hx DVT  Patient with a few day history of increased swelling below the right ankle. Nontender to palpation, no rashes, good pedal pulses. Possibly iso recent PAD surgery by vascular. D-dimer elevated on admission, LE doppler negative.     I have discussed the above with Dr. Guerra.    Thank you for consulting the cardiovascular services at the [facility]. Please do not hesitate to call us with any questions.     Mirian Muniz, MS4  Cardiology Consults

## 2023-08-31 NOTE — PROGRESS NOTES
Physician Attestation   I, Pelon Bourgeois MD, was present with the medical/DAYANA student who participated in the service and in the documentation of the note.  I have verified the history and personally performed the physical exam and medical decision making.  I agree with the assessment and plan of care as documented in the note.      العلي findings: Maribel developed diarrhea today and continues to be very fatigued. Nasal cannula oxygen was successfully removed this morning and she does feel that the inhaler helps her breathing. Kidney function improving. Continuing remdesivir and dexamethasone for COVID with lab monitoring. Diarrhea is likely due to COVID but will rule out bacterial infection due to immunosuppression. Encourage mobilization. Goal euvolemia and will adjust fluids accordingly. Appreciate cardiology input; no further inpatient work-up required.     40 MINUTES SPENT BY ME on the date of service doing chart review, history, exam, documentation & further activities per the note.    I have personally reviewed the following data over the past 24 hrs:    3.0 (L)  \   10.1 (L)   / 211     138 105 45.0 (H) /  93   4.8 22 2.12 (H) \     Procal: N/A CRP: 26.20 (H) Lactic Acid: N/A       INR:  N/A PTT:  N/A   D-dimer:  11.97 (H) Fibrinogen:  N/A         Pelon Bourgeois MD  Date of Service (when I saw the patient): 08/31/23       [facility]    History and Physical - Medicine Service, MAROON TEAM 1       Date of Admission:  8/30/2023    Assessment & Plan      Ms. Maribel Yang is a 72 year old female with medical history pertinent for HTN, CAD, inferior STEMI s/p RCA stent (4/7/21), PAD, AAA with prior fem-fem bypass s/p EVAR (4/6/21), tobacco use, COPD, SCC of R lung s/p SBRT (2014), anal canal carcinoma s/p chemoradiation (2018), ESRD s/p LDKT (2004), chronic anemia, history of DVT and osteoporosis. She presented to [facility] 8/30/2023 with 1 day of headache,  nausea, vomiting, fever, chills and dyspnea at rest in the presence of positive COVID test. Also presented with PIERCE and orthopnea without CP found to have pericardial effusion.     Changes Today  -Returned to RA, monitor O2 saturation with goal >=88%  -Restart Atorvastatin at 1 tablet 80 mg daily per home regimen  -Stool sample - follow up results  -Repeat magnesium following bolus  - Monitor I/O for the day and will continue or stop fluids accordingly with goal of euvolemia  - Strawberry Ensure with meals per her preference    # COVID-19 infection   # Hypoxia due to COVID infection  Patient with a 2 day history of N/V, fatigue, PIERCE, and non-productive cough. On admission, she required 2-3L NC to keep saturations >92%, but otherwise afebrile and hemodynamically stable. She was found to have positive COVID test. No leukocytosis. CXR with no infiltrates. D-dimer elevated at 11.7, but likely iso COVID and less likely PE given no chest pain, tachycardia.   - COVID-19 special precautions, continuous pulse-ox  - Oxygen: continue current support with nasal cannula at 2 L/min; titrate to keep SpO2 between 90-96%  - Labs: Standard COVID admission labs ordered (CBC with diff, CMP, INR, D-dimer, CRP).   - Imaging: chest x-ray ordered  - Breathing treatments: albuterol inhaler four times a day scheduled and PRN and long-acting anticholinergic daily; avoid nebulizers in favor of MDIs   - IV fluids: not indicated at this time  - Antibiotics: not indicated   - COVID-Focused Medications: Dexamethasone 6 mg x 10 days or until hospital discharge, started on 08/30/2023 and Remdesivir x 5 days or until hospital discharge, started on 08/30/2023  - DVT Prophylaxis:         - At high risk of thrombotic complications due to COVID-19 (DDimer elevated, plan to trend for COVID given low likelihood PE)        - PROPHYLACTIC dosing: heparin 5000 units every 8 hours   - See work up below for pericardial effusion     #Diarrhea   Prior to  admission on 08/30, pt endorsed one week of constipation but has confirmed COVID - 19 (08/30/2023) and is consistent with possible symptoms. No pain, hematochezia, melena, or foul smell associated diarrhea However, given her immunosuppression secondary to kidney transplant in, want to ensure there is not another infectious cause    -Stool samples - if negative could use immodium for symptom management    #Pericardial effusion without evidence of cardiac tamponade  #Chronic heart failure with recovered ejection fraction (50-55%)  #CAD, inferior STEMI s/p RCA stent (4/7/21)  #HTN  Patient with history of at least one week of orthopnea and PIERCE, but no chest pain, palpitations. EKG with Q waves but similar from prior. BNP elevated 4,557 (previously 1871). She was found to have moderate size pericardial effusion measuring 1.6 cm without evidence of tamponade on TTE 8/30.   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 Baseline Cr 1.8-2.0. On admission, Cr elevated to 2.42. BUN 47.7, normal potassium. Likely secondary to prerenal issues with GI symptoms and poor oral intake with COVID. No acute indications for dialysis.   - Transplant Nephrology Consult, appreciate recs  - UA with cx reflex  - PTA Tacro  - Hold prednisone iso dex use. Plan to resume home prednisone when dexamethasone complete.   - BK, CMV, HSV, IgG per nephrology  - Start mag sulfate 400mg QD  - Repeat magnesium to monitor levels    # PAD/AAA  Followed by Vascular Surgery as outpatient. Now recently with recent repair of right femoral artery pseudoaneurysm, s/p EVAR with  "fem-fem bypass. AAA last measured as 4.6cm with recommendation of close follow up outpatient.     #RLE Edema  Patient with a few day history of increased swelling below the right ankle. Nontender to palpation, no rashes, good pedal pulses. Possibly iso recent PAD surgery by vascular, but will get doppler to r/o DVT.   - RLE Doppler US - negative  - Follow volume status maintain euvolemic    #COPD  PFT's on 04/26/2023 show low FEV1 (0.87), FVC (2.18), FEV1:FVC ratio (60), and elevated RV with 35 year history of smoking tobacco are consistent with Obstructive lung disease  - PTA albuterol   - Combivent QID. Appreciate pharmacy liaison consult; she will benefit from anticholinergic inhaler outpatient.     #SCC s/p radiation  Patient with recent CT chest Aug/2023 increased size of right upper lobe nodularity adjusting evolving postradiation changed, but recommended continued close follow up.  Felt to be intermediate risk.  Followed by Pulmonary.    - Resume outpatient care  - Due for gynecological follow up     #Cachexia  - Nutrition consult  - Ordered strawberry Ensure with meals per her preference         Diet: Combination Diet 2 gm NA DietRegular diet, Na restriction  DVT Prophylaxis: Heparin SQ  Villavicencio Catheter: Not present  Fluids: None   Lines: None     Cardiac Monitoring: ACTIVE order. Indication: Acute decompensated heart failure (48 hours)  Code Status: Full CodeFull code    Clinically Significant Risk Factors            # Hypomagnesemia: Lowest Mg = 1.3 mg/dL in last 2 days, will replace as needed             # Hypertension: Noted on problem list    # Chronic heart failure with preserved ejection fraction: heart failure noted on problem list and last echo with EF >50%       # Cachexia: Estimated body mass index is 15.68 kg/m  as calculated from the following:    Height as of this encounter: 1.549 m (5' 1\").    Weight as of this encounter: 37.6 kg (83 lb).  , PRESENT ON ADMISSION            Disposition Plan "     Expected Discharge Date: 09/01/2023      Destination: home          The patient's care was discussed with the Attending Physician, Dr. Bourgeois .      RITA SMITH  [facility]  [facility]  Securely message with judo (more info)  Text page via [facility] Paging/Directory   See signed in provider for up to date coverage information  ______________________________________________________________________    Chief Complaint   Nausea, vomiting, fever, and dyspnea at rest concerned for COVID infection    History is obtained from the patient    History of Present Illness   Ms. Maribel Yang is a 72 year old female with medical history pertinent for HTN, CAD, inferior STEMI s/p RCA stent (4/7/21), PAD, AAA with prior fem-fem bypass s/p EVAR (4/6/21), tobacco use, COPD, SCC of R lung s/p SBRT (2014), anal canal carcinoma s/p chemoradiation (2018), ESRD s/p LDKT (2004), chronic anemia, history of DVT and osteoporosis. She presented to [facility] 8/30/2023 with 1 day of headache, nausea, vomiting, fever, chills and dyspnea at rest in the presence of positive COVID test.    Maribel Yang is a 72 year old female who presents with 1 day worsening headache,  nausea, vomiting, and fever. Maribel typically does not leave her home, but was watching her grandson earlier during the week prior to the acute onset of symptoms. Following onset she took an at home COVID test that returned positive on 08/29. She attempted to take tylenol to alleviate the symptoms, but did not see much if any improvement prompting her to follow up with her primary care provider who suggested she come into the ED to seek further care for COVID. Also with at least one week of orthopnea, no chest pain or palpitations.     Interval History  No acute overnight events. However, began experiencing diarrhea in am, absent of abdominal and flank pain, (08/31) following 1 week of constipation prior to  presentation to ED on 08/30 with confirmed COVID 19 infection. Prior to beginning treatment with Remdesivir and Dexamethasone on 08/30, pt had been placed on fluctuating 2 - 3 L of RA to improve oxygenation and experiencing fever, headache and endorsed feelings of nausea and vomiting. As of today 08/31, pt noted significant improvement of presenting symptoms and has been returned to room air. She continues to feel fatigued and weak with her current illness.     Chief Complaint   Patient presents with    Covid Concern       Active diagnoses this visit:     COVID-19 virus infection  Kidney replaced by transplant  SOB (shortness of breath)  COPD exacerbation (H)  Nausea and vomiting, unspecified vomiting type  Dehydration  Acute on chronic renal insufficiency  Pericardial effusion    Past Medical History    Past Medical History:   Diagnosis Date    Abnormal coagulation profile     p [id number] heterozygote     Age-related osteoporosis without current pathological fracture 06/22/2019    Anemia     Antiplatelet or antithrombotic long-term use     ASCUS with positive high risk HPV 2007, 2015    + HPV 56, 54,& 6, colp - TAL III, Leep =TAL II    Basal cell carcinoma     Congestive heart failure, unspecified HF chronicity, unspecified heart failure type (H) 06/22/2021    COPD (chronic obstructive pulmonary disease) (H)     Depressive disorder 07/2015    Heart attack (H)     History of blood transfusion     Hypertension     Immunosuppressed status (H)     due meds    Kidney replaced by transplant 09/2004    Living donor recipient,  Rejection 7/2005    LSIL (low grade squamous intraepithelial lesion) on Pap smear 04/2013    +HPV 33 or 45, 61      PAD (peripheral artery disease) (H)     PONV (postoperative nausea and vomiting)     Squamous cell lung cancer (H)     Thrombosis of leg 1967    Unspecified disorder of kidney and ureter     X-linked dominant Alport's syndrome.       Past Surgical History   Past Surgical History:    Procedure Laterality Date    BIOPSY      Kidney, Lung, Breast    BIOPSY ANAL N/A 03/14/2018    Procedure: BIOPSY ANAL;;  Surgeon: Shabbir Leo MD;  [facility]    BYPASS GRAFT FEMORAL FEMORAL Bilateral 04/25/2021    Procedure: Axplantation infected graft, femoral to femoral bypass with cadaveric artery, bilateral SATORIUS MUSCLE FLAP CREATION, evacuation of absece, vacuum closure;  Surgeon: Raven Lewis MD;  [facility]    COLONOSCOPY      COLONOSCOPY N/A 08/09/2017    Procedure: COMBINED COLONOSCOPY, SINGLE OR MULTIPLE BIOPSY/POLYPECTOMY BY BIOPSY;;  Surgeon: Sushil Hyatt MD;  [facility]    COLONOSCOPY N/A 7/28/2022    Procedure: COLONOSCOPY, WITH BIOPSY;  Surgeon: Moise Corcoran MD;  [facility]    COLPOSCOPY,LOOP ELECTRD CERVIX EXCIS  03/11/2008    TAL II    CONIZATION LEEP  07/17/2013    Procedure: CONIZATION LEEP;;  Surgeon: Liliana Renteria MD;  [facility]    CONIZATION LEEP N/A 08/17/2016    Procedure: CONIZATION LEEP;  Surgeon: Liliana Renteria MD;  [facility]    CV CORONARY ANGIOGRAM N/A 04/06/2021    Procedure: CV CORONARY ANGIOGRAM;  Surgeon: Sincere Rosas MD;  Location:  [facility]    CV CORONARY ANGIOGRAM N/A 04/25/2021    Procedure: Coronary Angiogram;  Surgeon: Sincere Rosas MD;  Location:  [facility] C[facility]    CV PCI STENT DRUG ELUTING N/A 04/06/2021    Procedure: Percutaneous Coronary Intervention Stent Drug Eluting;  Surgeon: Sincere Rosas MD;  Location:  [facility]ARD[facility] CATH LAB    ENDOVASCULAR REPAIR ANEURYSM AORTOILIAC Bilateral 04/06/2021    Procedure: Endovascular Abdominal Aortic Aneurysm Repair with Aortouniiliac Device and Femoral-Femoral Artery Bypass with 1ghB16xm Artegraft;  Surgeon: Abena Ferrara MD;  [facility]    ESOPHAGOSCOPY, GASTROSCOPY, DUODENOSCOPY (EGD), COMBINED N/A 2/2/2023    Procedure: ESOPHAGOGASTRODUODENOSCOPY, WITH BIOPSY;  Surgeon:  Yazan Heredia MD;  Location:  GI    EXAM UNDER ANESTHESIA ANUS  07/15/2014    Procedure: EXAM UNDER ANESTHESIA ANUS;  Surgeon: Rahda Musa MD;  [facility]    EXAM UNDER ANESTHESIA ANUS N/A 03/14/2018    Procedure: EXAM UNDER ANESTHESIA ANUS;  Anal Exam Under Anesthesia With Excision of anal lesion, proctoscopy;  Surgeon: Shabbir Leo MD;  [facility]    EYE SURGERY      GENITOURINARY SURGERY      IR OR ANGIOGRAM  04/06/2021    IRRIGATION AND DEBRIDEMENT GROIN Right 04/24/2021    Procedure: IRRIGATION AND DEBRIDEMENT, INGUINAL REGION, I & D PF RIGHT GROIN;  Surgeon: Raven Lewis MD;  [facility]    IRRIGATION AND DEBRIDEMENT GROIN N/A 04/26/2021    Procedure: IRRIGATION AND DEBRIDEMENT, INGUINAL REGION, PLACEMENT OF VERAFLO WOUND VAC, WOUND WASHOUT,;  Surgeon: Raven Lewis MD;  [facility]    LASER CO2 EXCISE VULVA WIDE LOCAL  07/15/2014    Procedure: LASER CO2 EXCISE VULVA WIDE LOCAL;  Surgeon: Liliana Renteria MD;  [facility]    LASER CO2 VAGINA  07/17/2013    Procedure: LASER CO2 VAGINA;;  Surgeon: Liliana Renteria MD;  [facility]    LASER CO2 VAGINA N/A 09/25/2018    Procedure: LASER CO2 VAGINA;  Exam Under Anesthesia, CO2 Laser Ablation of Upper Vagina and Cervix;  Surgeon: Pati Garcia MD;  [facility]    MICROSCOPY ANAL  07/17/2013    Procedure: MICROSCOPY ANAL;  Anal Microscopy,  EUA vagina,Colposcopy Of Vagina And Vulva, Vaginal Biopsies, Omniguide Co2 Laser To Vagina and vulva, Loop Electrosurgical Excision Procedure To Cervix;  Surgeon: Radha Musa MD;  [facility]    MICROSCOPY ANAL  07/15/2014    Procedure: MICROSCOPY ANAL;  Surgeon: Radha Musa MD;  [facility]    PICC DOUBLE LUMEN PLACEMENT Right 04/30/2021    39cm, Basilic vein    PSEUDOANEURYSM REPAIR Right 6/27/2023    Procedure: RIGHT FEMORAL TO PROFUNDA FEMORIS ARTERY BYPASS WITH cadaveric femoral-popliteal artery, REPAIR OF RIGHT  GROIN PSEUDOANEURYSM;  Surgeon: Abena Ferrara MD;  [facility]    TRANSESOPHAGEAL ECHOCARDIOGRAM INTRAOPERATIVE N/A 04/28/2021    Procedure: ECHOCARDIOGRAM, TRANSESOPHAGEAL, INTRAOPERATIVE;  Surgeon: GENERIC ANESTHESIA PROVIDER;  [facility]    VASCULAR SURGERY      Thrombectomy    [facility] NONSPECIFIC PROCEDURE      Thrombectomy    [facility] NONSPECIFIC PROCEDURE  1955 and 1959    Bilater eye surgery - correction for crossed eyes    [facility] NONSPECIFIC PROCEDURE  1998    oopherectomy L    [facility] NONSPECIFIC PROCEDURE  1967    open kidney biopsy - L    [facility] TRANSPLANTATION OF KIDNEY  09/2004    recipient -- done at [facility]       Prior to Admission Medications   Prior to Admission Medications   Prescriptions Last Dose Informant Patient Reported? Taking?   ASPIRIN LOW DOSE 81 MG chewable tablet 8/30/2023 at 0900 Self No Yes   Sig: CHEW AND SWALLOW 1 TABLET (81 MG) BY MOUTH DAILY   Lactobacillus-Inulin ("InvierteMe,SL"[facility] DIGESTIVE HEALTH) CAPS 8/30/2023 at 0900 Self No Yes   Sig: TAKE ONE CAPSULE BY MOUTH ONCE DAILY (DUE FOR PHYSICAL IN MARCH)   Nutritional Supplements (ENSURE CLEAR) LIQD 8/29/2023 at unknown Self No Yes   Sig: Take 1 Bottle by mouth daily   acetaminophen (TYLENOL) 325 MG tablet 8/29/2023 at pm Self No Yes   Sig: Take 2 tablets (650 mg) by mouth every 4 hours as needed for other (For optimal non-opioid multimodal pain management to improve pain control.)   albuterol (PROAIR HFA/PROVENTIL HFA/VENTOLIN HFA) 108 (90 Base) MCG/ACT inhaler 8/30/2023 at 0800 Self No Yes   Sig: Inhale 1-2 puffs into the lungs every 6 hours as needed for shortness of breath, wheezing or cough   atorvastatin (LIPITOR) 80 MG tablet 8/30/2023 at 0900 Self No Yes   Sig: Take 1 tablet (80 mg) by mouth daily   calcium carbonate-vitamin D (CALTRATE) 600-10 MG-MCG per tablet 8/30/2023 at 0900 Self No Yes   Sig: TAKE ONE TABLET BY MOUTH TWICE A DAY   clopidogrel (PLAVIX) 75 MG tablet 8/30/2023 at 0900 Self No Yes   Sig: Take 1 tablet (75 mg) by mouth  daily   esomeprazole (NEXIUM) 20 MG DR capsule 8/30/2023 at 0900 Self No Yes   Sig: Take 1 capsule (20 mg) by mouth every morning (before breakfast) Take 30-60 minutes before eating.   fluticasone (FLONASE) 50 MCG/ACT nasal spray More than a month at year ago Self No Yes   Sig: Spray 1 spray into both nostrils daily   hydrALAZINE (APRESOLINE) 10 MG tablet 8/30/2023 at 0900 Self No Yes   Sig: TAKE 1 TABLET (10 MG) BY MOUTH 3 TIMES DAILY   isosorbide mononitrate (IMDUR) 60 MG 24 hr tablet 8/30/2023 at 0900 Self No Yes   Sig: Take 1 tablet (60 mg) by mouth daily   lisinopril (ZESTRIL) 2.5 MG tablet 8/30/2023 at 0900 Self No Yes   Sig: TAKE 1 TABLET (2.5 MG) BY MOUTH DAILY   metoprolol tartrate (LOPRESSOR) 100 MG tablet 8/30/2023 at 0900 Self No Yes   Sig: Take 1 tablet (100 mg) by mouth daily   oxyCODONE (ROXICODONE) 5 MG tablet More than a month at unknown Self No Yes   Sig: Take 1 tablet (5 mg) by mouth every 4 hours as needed for severe pain   predniSONE (DELTASONE) 5 MG tablet 8/30/2023 at 0900 Self No Yes   Sig: Take 1 tablet (5 mg) by mouth daily   psyllium (METAMUCIL/KONSYL) 58.6 % powder Past Week at 2 days ago Self Yes Yes   Sig: Take by mouth every morning   sirolimus (GENERIC EQUIVALENT) 2 MG tablet  at HOLD SINCE JUNE Self No No   Sig: HOLD for surgery   Patient not taking: Reported on 7/19/2023   tacrolimus (GENERIC EQUIVALENT) 0.5 MG capsule 8/30/2023 at 0900 Self No Yes   Sig: Take 4 capsules (2 mg) by mouth 2 times daily   torsemide (DEMADEX) 10 MG tablet 8/30/2023 at 0900 Self No Yes   Sig: Take 1 tablet (10 mg) by mouth daily Take an additional 10 MG every other day as needed. Additional use as directed by [facility].      Facility-Administered Medications: None            Physical Exam   Vital Signs: Temp: 98.6  F (37  C) Temp src: Oral BP: (!) 142/87 Pulse: 63   Resp: 20 SpO2: 96 % O2 Device: None (Room air) Oxygen Delivery: 1 LPM  Weight: 83 lbs 0 oz    GENERAL APPEARANCE: pleasant, cachectic  appearing, resting comfortably in bed, no acute distress  HEENT: Normocephalic and atraumatic.   LUNGS: Clear to auscultation bilaterally without wheezes, rhonchi, or rales.  HEART: RRR with normal S1 and S2. Soft heart sounds. No murmurs, gallops, or rubs.  ABDOMEN: Soft, nontender, nondistended.   EXTREMITIES: right lower extremity edema from below the ankle, nontender without surrounding erythema. Peripheral pedal pulses palpated.   SKIN: No rashes on visible skin  NEUROLOGIC: Moving all extremities equally      Medical Decision Making         Data     I have personally reviewed the following data over the past 24 hrs:    3.0 (L)  \   10.1 (L)   / 211     138 105 45.0 (H) /  93   4.8 22 2.12 (H) \     Procal: N/A CRP: 26.20 (H) Lactic Acid: N/A       INR:  N/A PTT:  N/A   D-dimer:  11.97 (H) Fibrinogen:  N/A

## 2023-08-31 NOTE — CONSULTS
"Chronic Pulmonary Disease Specialist Consult   COPD Initial Interview    2023    Patient: Maribel Yang      :  1950                    MRN:[id number]      Reason for Consult:  Consulted to provide COPD education and optimize treatment regimen. Patient with severe COPD being followed by COPD Readmission Reduction Program    History of Present Illness: Ms. Maribel Yang is a 72 year old female with medical history pertinent for HTN, CAD, inferior STEMI s/p RCA stent (21), PAD, AAA with prior fem-fem bypass s/p EVAR (21), tobacco use, COPD, SCC of R lung s/p SBRT (), anal canal carcinoma s/p chemoradiation (), ESRD s/p LDKT (), chronic anemia, history of DVT and osteoporosis. She presented to [facility] 2023 with 1 day of headache, nausea, vomiting, fever, chills and dyspnea at rest in the presence of positive COVID test. Also presented with PIERCE and orthopnea without CP found to have pericardial effusion.     Smoking Hx:    Patient has a quit date in chart of 3/1/2021; however, patient continues to smoke \"once in a while\" per patient.                 Pulmonologist/Last office visit   2023 Virtual visit with Dr. Nii Mckeon, Interventional Pulmonologist.    Most recent  PFT/interpretation on: 2023               FVC            2.18 LPM   89%                   FEV 1            1.31 LPM   67%   FVC/FEV 1 (Ratio)                               60%   DLCO                               36%     PFT Interpretation:   Nonspecific spirometry pattern suggests Mild airflow obstruction.   Severe diffusion defect.  The diffusing capacity was not corrected for the patient's hemoglobin.   There is air trapping without hyperinflation.     Recommendations:     Overnight oximetry 24-48 hours prior to discharge to assess for nocturnal hypoxia    Will need follow up appointment with Pulmonologist and complete PFT's.    Referral for outpatient pulmonary Rehab    Referral for OP sleep consult to " "assess for sleep disordered breathing, MARTITA, nocturnal hypoxia, and hypoventilation    Palliative consult to discuss symptom management: degree of air hunger/pain/coping with illness/anxiety and depression/advanced care planning/ goals of care and comfort.    Home Oxygen Assessment Testing 24-48 hours prior to discharge to determine O2 needs at rest and with exertion/activity. If the patient requires oxygen at rest, the walk test must be performed using the new baseline O2 to determine if patient needs more oxygen with activity.   In the event patient qualifies for oxygen, at rest or with activity, please use the following verbiage in oxygen order: \"Please provide portable oxygen concentrator AND please test for oxygen conserving device using ____LPM to maintain sats between ____)    Continue with current inpatient respiratory medication schedule.    Use Aerobika Oscillating PEP Device for 3 sets of 10 breaths two times daily as directed above  7 mg Nicotine Patch to help reduce symptoms of withdrawal and cravings   Smoking Cessation Counseling and Relapse Prevention Consult ( Brief smoking cessation session completed. Patient offered and accepted a \"Quitting for Good with Treatment and Support\" workbook.  At discharge continue with patient's home regimen of respiratory medications    Sleep Studies:  none    Home Oxygen Use:  none        Pulmonary Rehab History:  none   Patient was evaluated for cardiac rehab in 2021 but had home PT so did not qualify for rehab.    Home respiratory medications include:      Albuterol MDI 1-2 puffs Q 6 hours prn    Assessment:   Maribel was resting comfortably in bed upon entrance to her room. Patient was on RA with oxygen saturation of 96%. Maribel was not in respiratory distress but was slightly short of breath.     Action:         Evaluated patients inspiratory flow using In-Check device:     --Low resistance setting: Patient able to generate 45 LPM, which is sufficient inspiratory " flow for an Albuterol rescue inhaler. Albuterol inhalers require a slow inhalation of 20-60 LPM for optimal drug deposition with 5-10 second breath hold.      --Medium resistance setting: Patient able to generate 50 LPM, which is sufficient inspiratory flow for a DPI.   Medium resistance inhalers require a fast and deep inhalation of 30-80LPM with 5-10 second breath hold.   Tested patient for medium resistance devices because her CAT (COPD Assessment Score) # was 19. A number =/>10 indicates that the patient has significant symptoms.  Gave patient list of LABA/LAMA combination respiratory medications and LAMA medications (Ellipta DPI and Respimat options) and included cost of each so patient could make an informed decision. Patient would benefit from beginning one of these maintenance medications. Patient states that she can not afford any of these medications (she is in the Medicare gap portion of her coverage). She is declining the use of these respiratory medications at this time. She would like to wait until her coverage year starts again in January 2024. Recommended that she bring this list to her next office visit with Dr. Mckeon and have a discussion with him. Shared with Maribel that there is also the option of using nebulizers as these are often more affordable.     --Evaluated patient. Educated patient on proper inspiratory flows needed for her Albuterol inhalers. Provided and instructed patient on proper use of Aerochamber spacer with inhaler; reiterated that spacer should always be used with her rescue inhaler.       -Patient able to generate a pressure of 10-15 cm H2O on Aerobika OPEP device for 1 second generating good strong non-productive cough. The goal for each breath, for a total of 3 sets of 10 breaths, is to exhale at a of pressure 10-20 for 3-4 seconds without fatigue. Educated patient to perform 2 to 3 'edge coughs'  to clear airway after each set. Shared that consistent use of this device will  help open smaller airways, improve mucus clearance, decrease cough frequency, and improve exercise tolerance. Patient is short of breath due to her current COVID infection so unable to perform the 30 breaths recommended for each treatment. Explained to Maribel that when she gets to her baseline it will be easier for her to use this device effectively.      Will continue to follow and support patient as needed. Will follow up with phone call 48 hours after discharge.     I spent 60 minutes with the patient.    Mikayla Ramos, CHERRI, RRT, CTTS  Chronic Pulmonary Disease Specialist  Office: [phone]   Pager: [phone]

## 2023-08-31 NOTE — PROGRESS NOTES
"CLINICAL NUTRITION SERVICES - ASSESSMENT NOTE     Nutrition Prescription    RECOMMENDATIONS FOR MDs/PROVIDERS TO ORDER:  Diet per team     Malnutrition Status:    Severe malnutrition in the context of chronic illness    Recommendations already ordered by Registered Dietitian (RD):  Ordered Ensure plus strawberry with bkf and lunch, vanilla with dinner trays to optimize intake     Future/Additional Recommendations:  PO adequacy       REASON FOR ASSESSMENT  Maribel Yang is a/an 72 year old female assessed by the dietitian for Provider Order - RN consult -> cachetic appearing woman, poor po intake and decreased appeitie     Chart reviewed:  PMH: Alport's syndrome with ESRD, s/p LDKT 2004, HFrEF, CAD s/p PCI, Pericardial effusion, PAD/AAA, COPD, SCC of R lung s/p radiation/SBRT (),  anal canal carcinoma s/p chemoradiation (2018)     Presented to [facility] 2023 with 1 day of headache, nausea, vomiting, fever, chills and dyspnea at rest in the presence of positive COVID test. Also presented with PIERCE and orthopnea without CP found to have pericardial effusion.     On Enteric precaution for C.diff and Covid  Started remdesivir and dexamethasone.       NUTRITION HISTORY  Per above. Patient not available to see     CURRENT NUTRITION ORDERS  Diet: 2 g Sodium  Intake/Tolerance: patient is having a good appetite and adequate po intake per nursing report     LABS  CRP: 34.9 -> down to 26.2 on   BUN: 60.6, Cr: 2.10, GFR: 25 -> nephrology following   K+: 5.1, Phos: N/A  B (elevated)    MEDICATIONS  Immunosuppression of tacrolimus and dexamethasone.   Vanco  Torsemide  Calcium carb with vitamin D  Remdesivir and decadron  Senna       ANTHROPOMETRICS  Height: 154.9 cm (5' 1\")  Most Recent Weight: 37.6 kg (83 lb) wt from 23 ( admit wt N/A)  IBW: 47.7 kg (79% IBW)  BMI: 15.68 kg /m2 -> Underweight BMI <18.5  Weight History: 3.6% in 1 month , 7.9% in 3 month   Wt Readings from Last 10 Encounters:   23 " 37.6 kg (83 lb)   07/19/23 37.2 kg (82 lb)   06/28/23 38.6 kg (85 lb 1.6 oz)   06/21/23 39.9 kg (88 lb)   06/14/23 39.1 kg (86 lb 3.2 oz)   04/06/23 40.4 kg (89 lb)   03/15/23 40.3 kg (88 lb 14.4 oz)   02/02/23 39.9 kg (88 lb)   01/25/23 42 kg (92 lb 8 oz)   01/25/23 40.7 kg (89 lb 12.8 oz)       Dosing Weight: 38 kg wt from 7/19/23    ASSESSED NUTRITION NEEDS  Estimated Energy Needs: 1330- 1520 kcals/day (35 - 40 kcals/kg)  Justification: Repletion and Underweight  Estimated Protein Needs: 38- 46 ++grams protein/day (1 - 1.2 ++grams of pro/kg)  Justification: AMY/ history of renal txp / CKD, ++ pending AMY  Estimated Fluid Needs: CHF history   Justification: Per provider pending fluid status    PHYSICAL FINDINGS  right lower extremity edema from below the ankle,     MALNUTRITION  % Intake: Unable to assess  % Weight Loss: > 7.5% in 3 months (severe)  Subcutaneous Fat Loss: Severe visual   Muscle Loss: Unable to assess  Fluid Accumulation/Edema: Mild  Malnutrition Diagnosis: Severe malnutrition in the context of chronic illness    NUTRITION DIAGNOSIS  Increased nutrient needs kcal related to presentation with infectious etiology as evidenced by estimated need 35-40 kcal/kg per admit wt      INTERVENTIONS  Implementation  Nutrition Education: Unable to complete   Medical food supplement therapy     Goals  Patient to consume % of nutritionally adequate meal trays TID, or the equivalent with supplements/snacks.     Monitoring/Evaluation  Progress toward goals will be monitored and evaluated per protocol.  Azul Latham RD/NANNETTE  Pager [phone]

## 2023-08-31 NOTE — CONSULTS
Discharge Pharmacy Test Claim    Patient is currently in the coverage gap phase of her [employer] Medicare advantage plan. Expected monthly copays listed below for requested test claims.    Test Claim Copay   anoro 111.52   incruse 85.67   stiolto 113.60   spiriva handihaler 123.87   spiriva respimat 123.87   bevespi not covered     Natalie Samaniego  [facility] Pharmacy Liaison  Ph: [phone] Pager: [phone]   Securely message with the Vocera Web Console (learn more here)

## 2023-08-31 NOTE — UTILIZATION REVIEW
Admission Status; Secondary Review Determination       Under the authority of the Utilization Management Committee, the utilization review process indicated a secondary review on the above patient. The review outcome is based on review of the medical records, discussions with staff, and applying clinical experience noted on the date of the review.     (x) Inpatient Status Appropriate - This patient's medical care is consistent with medical management for inpatient care and reasonable inpatient medical practice.     RATIONALE FOR DETERMINATION   72 year old female with medical history pertinent for HTN, CAD, inferior STEMI s/p RCA stent (4/7/21), PAD, AAA with prior fem-fem bypass s/p EVAR (4/6/21), tobacco use, COPD, SCC of R lung s/p SBRT (2014), anal canal carcinoma s/p chemoradiation (2018), ESRD s/p LDKT (2004), chronic anemia, history of DVT and osteoporosis. She presented to [facility] 8/30/2023 with 1 day of headache, nausea, vomiting, fever, chills and dyspnea at rest in the presence of positive COVID test. Also presented with PIERCE and orthopnea without CP found to have pericardial effusion.    No compromise patient with multiple comorbidities at significant risk of clinical deterioration and poor outcome, on intravenous remdesivir, dexamethasone, multiple consultations pending.  This is a conditional review for a Medicare patient, at the time of this review patient is anticipated to require at least 1 more night of hospital care; however if plan changes and discharges before 2 midnights please send for post discharge review.        This document was produced using voice recognition software       The information on this document is developed by the utilization review team in order for the business office to ensure compliance. This only denotes the appropriateness of proper admission status and does not reflect the quality of care rendered.   The definitions of Inpatient Status and Observation Status used in  making the determination above are those provided in the CMS Coverage Manual, Chapter 1 and Chapter 6, section 70.4.   Sincerely,   TY MODI MD   System Medical Director   Utilization Management   [facility].

## 2023-08-31 NOTE — PLAN OF CARE
Patient transferred to , room 506, the writer gave report to RENETTA Briscoe, RN

## 2023-08-31 NOTE — SUMMARY OF CARE
Reason for admission: monitoring and treatment related to COPD and heart failure diagnoses. COVID 19 treatment.  Admitted from: ED   Report received from: Yolanda Damian RN          2 RN skin assessment completed by: Erin RN, Mohini RN      - Findings (add LDA if needed): Redness blanchable coccyx  Bed algorithm reevaluated: yes  Was Pulsate ordered?:no  Care plan (primary problem) and education initiated: yes  MDRO education done if applicable: N/A  Pt informed about policy regarding no IV pumps off unit: yes  Flu shot ordered? (October-April only): N/A  Detailed Belongings: sandals, cell phone, set of clothes, robe, red blanket

## 2023-08-31 NOTE — CONSULTS
Care Management Initial Consult    General Information  Assessment completed with: Patient,    Type of CM/SW Visit: Initial Assessment    Primary Care Provider verified and updated as needed: Yes   Readmission within the last 30 days: no previous admission in last 30 days      Reason for Consult: discharge planning  Advance Care Planning: Advance Care Planning Reviewed: no concerns identified       Communication Assessment  Patient's communication style: spoken language (English or Bilingual)    Hearing Difficulty or Deaf: no   Wear Glasses or Blind: yes    Cognitive  Cognitive/Neuro/Behavioral: WDL                      Living Environment:   People in home: spouse  [place]  Current living Arrangements: house      Able to return to prior arrangements: yes  Living Arrangement Comments:  and Daughter take care of most of her needs    Family/Social Support:  Care provided by: self, child(steve), spouse/significant other  Provides care for: no one, unable/limited ability to care for self  Marital Status:   , Children (Grandson)          Description of Support System: Supportive, Involved    Support Assessment: Adequate family and caregiver support, Adequate social supports    Current Resources:   Patient receiving home care services: No     Community Resources: Meals on Wheels, Financial/Insurance  Equipment currently used at home: walker, rolling, shower chair (only uses walker outside of house otherwise  helps her upstairs and brings things to her)  Supplies currently used at home: Nutritional Supplements    Employment/Financial:  Employment Status: retired        Financial Concerns: No concerns identified   Referral to Financial Worker: No     Does the patient's insurance plan have a 3 day qualifying hospital stay waiver?  Yes   Will the waiver be used for post-acute placement? No    Functional Status:  Prior to admission patient needed assistance:   Dependent ADLs:: Transfers, Positioning,  "Ambulation-walker  Dependent IADLs:: Transportation, Shopping, Laundry, Cleaning, Meal Preparation, Cooking     Mental Health Status:  Mental Health Status: No Current Concerns       Chemical Dependency Status:  Chemical Dependency Status: No Current Concerns           Values/Beliefs:  Spiritual, Cultural Beliefs, Orthodox Practices, Values that affect care: no             Additional Information:  Called Patient on Phone due to Covid+ status. Patient reports she is less independent than she usually is as of recently. She states she only uses a rolling walker to get around outside of her house. She feels \"very spoiled\" letting her  and daughter help with most of her cares such as cooking, cleaning, and transport uses the shared Grandson's car. She has no financial concerns and utilizes meals of wheels as well as Social Security for community resources. Patient had question about IV antibiotics upon discharge and because she just got admitted she may discharge on orals or need her daughter to administer IV antibiotics at home to which she has already had teachings for. Pt denies any financial, safety, or abuse concerns at this time.         EVITA Tapia, [facility]   Covering 7D SW  Ph: [phone]     "

## 2023-08-31 NOTE — PLAN OF CARE
Goal Outcome Evaluation:      Plan of Care Reviewed With: patient    Overall Patient Progress: improvingOverall Patient Progress: improving    Outcome Evaluation: Assumed care from [id number]. Pt was up independently in room. On 1.5-2L NC overnight. Has NS running at 100mL/hr. BP was high in AM, has AM BP meds. Next nurse notified and is aware. On tele and cont pulse ox. Denies pain. No fever. VSS. Alert and oriented. Plan is for COVID treatment and workup of pericardial effusion.

## 2023-08-31 NOTE — PROGRESS NOTES
[facility]   Transplant Nephrology Progress Note  Date of Admission:  8/30/2023  Today's Date: 08/31/2023    Recommendations:  - No acute indications for dialysis.  - Would continue on present immunosuppression of tacrolimus and dexamethasone.    Assessment & Plan   # LDKT: Trend down in creatinine.  Okay urine output.  AMY likely secondary to prerenal issues with GI symptoms and poor oral intake with COVID. No acute indications for dialysis.    - Baseline Creatinine: ~ 1.3-1.6   - Proteinuria: Not checked recently, but previously was moderate at ~ 2 grams   - Date DSA Last Checked: Apr/2018      Latest DSA: Not checked recently due to time from transplant   - BK Viremia: Not checked recently due to time from transplant   - Kidney Tx Biopsy: Feb 24, 2006; Result: Mild acute cellular-mediated rejection.  Mild interstitial fibrosis and tubular atrophy.                                              Jul 18, 2005; Result: Mild acute cellular-mediated rejection.  Mild interstitial fibrosis and tubular atrophy.                                              Jun 24, 2005; Result: Mild acute cellular-mediated rejection.  Mild interstitial fibrosis and tubular atrophy.                                              May 04, 2005; Result: Mild acute cellular-mediated rejection.  Mild interstitial fibrosis and tubular atrophy.    # Immunosuppression Prior to Admission: Tacrolimus immediate release (goal 4-6) and Prednisone (dose 5 mg daily)   - Recently, patient was on sirolimus with goal level 4-6, but changed to tacrolimus in preparation for surgery in June.   - Present Immunosuppression: Tacrolimus immediate release (goal 4-6) and Dexamethasone (dose 6 mg daily)   - Patient is in an immunosuppressed state and will continue to monitor for efficacy and toxicity of immunosuppression medications.   - Changes: Not at this time; After patient is done with dexamethasone, would restart  prednisone 5 mg daily.    # Infection Prophylaxis:   Last CD4 Level: 225 (Dec/2021)  - PJP: None    # Hypertension: Borderline control;  Goal BP: < 140/90 (Hospitalization goal)   - Volume status: Euvolemic   - Changes: Not at this time; Would continue in metoprolol and hydralazine, but hold lisinopril for AMY.    # Anemia in Chronic Renal Disease: Hgb: Stable      JONATHAN: No   - Iron studies: Low iron saturation    # Mineral Bone Disorder:   - Vitamin D; level: Not checked recently        On supplement: Yes  - Calcium; level: Normal        On supplement: Yes    # Electrolytes:   - Potassium; level: Normal        On supplement: No  - Magnesium; level: Normal        On supplement: Yes  - Bicarbonate; level: Normal        On supplement: No    # COVID Infection: Patient with new onset symptoms, including respiratory and mild GI symptoms.  Okay oxygenation.  No overt pneumonia, at least yet, on CXR.  Now with diarrhea, likely related to COVID.  Started on remdesivir and dexamethasone.  Trend down in CRP inflammation.     # HFrEF: Last cardiac echo (Aug/2023) showed borderline LVEF ~ 50-55% with thinning and akinesis of the mid anteroseptal segments.  Also noted to have moderate pericardial effusion.     # CAD, s/p PCI: No anginal symptoms at present.    # Pericardial Effusion: Patient with moderate pericardial effusion on recent cardiac echo.  No evidence of tamponade physiology or other symptoms.  Cardiology recommended just following.     # PAD/AAA: Now recently with recent repair of right femoral artery pseudoaneurysm following previous infrarenal AAA, s/p EVAR with fem-fem bypass.  Followed by Vascular Surgery.     # COPD: Patient with some shortness of breath, but likely secondary to COVID, although no evidence of pneumonia on CXR at this time.  On inhalers.     # Lung Cancer: Patient with recent CT chest Aug/2023 increased size of right upper lobe nodularity adjusting evolving postradiation changed, but recommended  continued close follow up.  Felt to be intermediate risk.  Followed by Pulmonary.     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  Could have higher EBV viremia in the setting of acute illness, but would not be treated.              - Recommend checking EBV PCR in ~ 4-6 weeks following this acute illness.    # Transplant History:  Etiology of Kidney Failure: Alport's syndrome  Tx: LDKT  Transplant: 9/20/2004 (Kidney)  Significant changes in immunosuppression:  Decreased immunosuppression due to recurrent cancers.  Significant transplant-related complications: EBV Viremia and Recurrent Skin Cancers    Recommendations were communicated to the primary team via this note.    Hitesh See MD   Pager: [phone]    Interval History   Ms. Hawthorne creatinine is 2.12 (08/31 0618); Trend down.  Okay urine output.  Other significant labs/tests/vitals: Stable electrolytes.  Stable hemoglobin.  No new events overnight.  Patient reports cough is slightly improved.  No chest pain, but some shortness of breath, although better.  No leg swelling.  No nausea and vomiting.  Bowel movements are watery.  No fever, sweats or chills.    Review of Systems   4 point ROS was obtained and negative except as noted in the Interval History.    MEDICATIONS:   remdesivir  100 mg Intravenous Q24H    And    sodium chloride 0.9%  50 mL Intravenous Q24H    aspirin  81 mg Oral Daily    atorvastatin  80 mg Oral Daily    calcium carbonate-vitamin D  1 tablet Oral BID    clopidogrel  75 mg Oral Daily    dexAMETHasone  6 mg Oral Daily    heparin ANTICOAGULANT  5,000 Units Subcutaneous Q12H    hydrALAZINE  10 mg Oral TID    ipratropium-albuterol  2 puff Inhalation 4x Daily    isosorbide mononitrate  60 mg Oral Daily    magnesium oxide  400 mg Oral Daily    metoprolol tartrate  100 mg Oral Daily    pantoprazole  40 mg Oral Daily    [Held by provider] predniSONE  5 mg Oral Daily    sodium chloride (PF)  3 mL Intracatheter Q8H     "sodium chloride (PF)  3 mL Intracatheter Q8H    tacrolimus  2 mg Oral BID IS    [Held by provider] torsemide  10 mg Oral Daily      - MEDICATION INSTRUCTIONS -         Physical Exam   Temp  Av.9  F (36.6  C)  Min: 96.6  F (35.9  C)  Max: 98.6  F (37  C)      Pulse  Av.2  Min: 61  Max: 76 Resp  Av.1  Min: 13  Max: 28  FiO2 (%)  Av %  Min: 2 %  Max: 2 %  SpO2  Av %  Min: 94 %  Max: 100 %     BP (!) 142/87   Pulse 63   Temp 98.6  F (37  C) (Oral)   Resp 20   Ht 1.549 m (5' 1\")   Wt 37.6 kg (83 lb)   SpO2 96%   BMI 15.68 kg/m     Date 23 0700 - 23 0659   Shift 2693-1619 8542-0055 0127-1436 24 Hour Total   INTAKE   Shift Total(mL/kg)       OUTPUT   Urine 400   400   Shift Total(mL/kg) 400(10.62)   400(10.62)   Weight (kg) 37.65 37.65 37.65 37.65      Admit Weight: 37.6 kg (83 lb)     GENERAL APPEARANCE: alert and no distress, frail looking  HENT: mouth without ulcers or lesions  RESP: lungs clear to auscultation - no rales, rhonchi or wheezes  CV: regular rhythm, normal rate, no rub, no murmur  EDEMA: no LE edema bilaterally  ABDOMEN: soft, nondistended, nontender, bowel sounds normal  MS: extremities normal - no gross deformities noted, no evidence of inflammation in joints, no muscle tenderness  SKIN: no rash  TX KIDNEY: normal  DIALYSIS ACCESS: none    Data   All labs reviewed by me.  CMP  Recent Labs   Lab 23  0618 23  1149    138   POTASSIUM 4.8 4.2   CHLORIDE 105 98   CO2 22 25   ANIONGAP 11 15   GLC 93 104*   BUN 45.0* 47.7*   CR 2.12* 2.42*   GFRESTIMATED 24* 21*   KAYKAY 8.6* 9.4   MAG 2.1 1.3*   PROTTOTAL  --  6.6   ALBUMIN  --  3.8   BILITOTAL  --  0.2   ALKPHOS  --  134*   AST  --  22   ALT  --  15     CBC  Recent Labs   Lab 23  0618 23  1149   HGB 10.1* 10.8*   WBC 3.0* 4.8   RBC 3.84 4.07   HCT 32.8* 35.5   MCV 85 87   MCH 26.3* 26.5   MCHC 30.8* 30.4*   RDW 16.4* 16.3*    252     INR  Recent Labs   Lab 23  1149   INR 1.05 "   PTT 29     ABGNo lab results found in last 7 days.   Urine Studies  Recent Labs   Lab Test 08/31/23  0447 03/02/23  1400 06/23/21  0505 06/13/21  1819   COLOR Light Yellow Light Yellow Straw Light Yellow   APPEARANCE Clear Clear Clear Clear   URINEGLC Negative Negative Negative Negative   URINEBILI Negative Negative Negative Negative   URINEKETONE Negative Negative Negative Negative   SG 1.013 1.011 1.010 1.011   UBLD Trace* Negative Negative Negative   URINEPH 6.0 5.5 6.5 5.5   PROTEIN 100* 20* Negative 70*   NITRITE Negative Negative Negative Negative   LEUKEST Negative Negative Negative Negative   RBCU 3* 2  --  1   WBCU <1 1  --  3     Recent Labs   Lab Test 05/03/22  1459 04/24/18  1812 01/31/17  1030 06/03/16  0900 10/22/15  1024 06/25/15  1007   UTPG 2.58* 1.04* 1.67* 0.76* 0.44* 0.31*     PTH  Recent Labs   Lab Test 06/12/19  1810   PTHI 89*     Iron Studies  Recent Labs   Lab Test 06/21/23  1133 01/25/23  1536 06/22/22  1551   IRON 40 45 30*    228* 250   IRONSAT 16 20 12*   AVERY 86 410* 82       IMAGING:  All imaging studies reviewed by me.

## 2023-09-01 NOTE — PLAN OF CARE
Cares from: [id number]     V/S & pain: VSS on RA ex HTN- managed w/ scheduled bp meds, back pain managed well w/ prn oxycodone. tele stable showing A-fib  Neuro: A/O x4, calm and cooperative   Respiratory: transitioned to RA this morning and has remained stable, PIERCE w/ infrequent dry cough and clear/diminished lung sounds bilaterally, continuous pulse ox monitoring to the desk  Skin: no new skin concerns present- blanchable redness to sacrum/coccyx refused mepi placement   GI/: using commode at bedside, voiding spontaneously, BM x2- still having diarrhea, stool sample sent  Nutrition: 2g Na diet w/ good appetite and adequate po intake, denies N/V  Lines/drains: remdesivir paused, awaiting new PIV placement  Activity: up SBA   Labs: no RN managed labs- magnesium was noted to be replaced yesterday in ED, re-check was WNL @ 2.1     Events this shift: no acute events this shift. Pt remains vitally stable on RA ex HTN- bp went down well w/ scheduled meds. tolerating a 2G Na diet w/ good appetite. Prn oxycodone given for back pain w/ good effects. Stool sample sent d/t diarrhea and immunocompromised state- C-diff pcr resulted positive, other stool studies negative. Pt transitioned to RA and remains stable w/ continuous pulse ox monitoring to the desk. Continuing w/ remdesivir and dexamethasone. Cards consult completed this shift. Patients daughter came to visit this afternoon but after she entered the room daughter passed out and hit her head on the wall, was brought down to ED for evaluation. Patient anxious and frazzled after, re-assurance provided. awaiting safe discharge plans, Q2 hour rounding completed, call light w/in reach and able to make needs known, will continue to monitor.     Plan: continue w/ poc    Goal Outcome Evaluation:           Overall Patient Progress: improvingOverall Patient Progress: improving    Outcome Evaluation: stable on RA, awaiting piv placement to cont. remdesivir

## 2023-09-01 NOTE — DISCHARGE SUMMARY
"[facility]  Discharge Summary - Medicine & Pediatrics       Date of Admission:  8/30/2023  Date of Discharge:  9/1/2023  Discharging Provider: Dr. 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    Weight as of this encounter: 37.6 kg (83 lb).  # Severe Malnutrition: based on nutrition assessment      Follow-ups Needed After Discharge   Follow up with transplant neprhology as scheduled  Follow up with PCP in 1-2 weeks    Unresulted Labs Ordered in the Past 30 Days of this Admission       Date and Time Order Name Status Description    8/30/2023 11:00 AM Blood Culture Peripheral Blood Preliminary     8/30/2023 11:00 AM Blood Culture Peripheral Blood Preliminary         These results will be followed up by PCP    Discharge Disposition   Discharged to home  Condition at discharge: Stable    Hospital Course   Ms. Maribel Yang is a 72 year old female with medical history pertinent for HTN, CAD, inferior STEMI s/p RCA stent (4/7/21), PAD, AAA with prior fem-fem bypass s/p EVAR (4/6/21), tobacco use, COPD, SCC of R lung s/p SBRT (2014), anal canal carcinoma s/p chemoradiation (2018), ESRD s/p LDKT (2004), chronic anemia, history of DVT and osteoporosis. She presented to [facility] 8/30/2023 with a few days of headache, nausea, vomiting, fever, chills and dyspnea found to be COVID positive.      # COVID-19 infection   # Hypoxia " due to COVID infection  Patient with a 2 day history of N/V, fatigue, PIERCE, and non-productive cough. On admission, she required 2-3L NC to keep saturations >92%, but was otherwise afebrile and hemodynamically stable. She was found to have positive COVID test. No leukocytosis. CXR with no infiltrates or pleural effusion. D-dimer elevated at 11.7, but likely iso COVID as recheck the next day was down trending. Treated with remdesevir and dexamethasone (8/30-9/1) with improvement of her symptoms and was titrated off supplemental O2 on HD#1. Walk test performed with nursing at bedside showed patient without desaturation. Suspect most of her symptoms are attributed to her COVID diagnosis and should improve over the next few days. Of note, she does have a history of COPD suboptimally managed with albuterol inhaler (see below), but think less likely COPD exacerbation contributing given no changes in sputum nor wheezing heard on exam.   - Continue symptomatic management    #COPD  #Tobacco use (35 yr)  PFT's on 04/26/2023 show low FEV1 (0.87), FVC (2.18), FEV1:FVC ratio (60), and elevated RV are consistent with Obstructive lung disease. Per chart review, patient is suboptimally managed with PTA albuterol despite confirmed COPD, appears LABA/LAMA inhalers are not covered by her insurance and states she cannot afford them. Consulted RT who recommended she follow up with outpatient sleep study and pulmonary rehab. NO wheezing, sputum changes on exam.  - Follow up with PCP within 1 week to discuss recs provided by RT   - Referral for outpatient pulmonary reab   - Sleep test for MARTITA   - Palliative care to discuss symptom management   - PTA albuterol   - Combivent QID while inpatient  - Counseled by RT for smoking cessation     #Diarrhea   Prior to admission on 08/30, pt endorsed one week of constipation. However, developed diarrhea on HD#1. No abdominal pain, hematochezia, melena, or foul smell stools. Enteric panel testing  unremarkable, C. Diff culture positive but antigen testing negative suggestive of colonization thus did not treat with vancomycin. Recommend continued symptomatic management.      #Pericardial effusion without evidence of cardiac tamponade  #Chronic heart failure with recovered ejection fraction (50-55%)  #CAD, inferior STEMI s/p RCA stent (4/7/21)  #HTN  Patient with history of at least one week of orthopnea and PIERCE, but no chest pain, palpitations. EKG with Q waves but similar from prior. BNP elevated 4,557 (previously 1871). She was found to have moderate size pericardial effusion measuring 1.6 cm without evidence of tamponade on TTE 8/30. Cardiology consulted, who had no further recommendations or interventions. As patient was persistently hypertensive this hospitalization (140-160's), recommended by nephrology to increase her hydralazine 25mg TID while holding her lisinopril and torsemide this weekend (restart 9/4). At the resumption of her lisinopril/torsemide she can resume her regular hydralazine dose.   - Holding PTA Lisinopril, Torsemide iso AMY and recommend restarting on Mon. 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 Baseline Cr 1.8-2.0. On admission, Cr elevated to 2.42. BUN 47.7, normal potassium. Likely secondary to prerenal issues with GI symptoms and poor oral intake with COVID. No acute indications for dialysis. UA without evidence of infection.   - Transplant Nephrology Consulted  - PTA Tacro  - Held prednisone while on dexamethasone, restarted at discharge  - Start mag sulfate 400mg QD     # PAD/AAA  Followed by Vascular Surgery as outpatient. Now recently with recent  repair of right femoral artery pseudoaneurysm, s/p EVAR with fem-fem bypass. AAA last measured as 4.6cm with recommendation of close follow up outpatient.      #RLE Edema  Patient with a few day history of increased swelling below the right ankle. Nontender to palpation, no rashes, good pedal pulses. RLE doppler US negative for DVT. Likely iso recent PAD surgery by vascular, but will get doppler to r/o DVT.   - Continue to monitor      Consultations This Hospital Stay   INFECTIOUS DISEASE TRANSPLANT SOT ADULT IP CONSULT  NEPHROLOGY KIDNEY/PANCREAS TRANSPLANT ADULT IP CONSULT  CARDIOLOGY GENERAL ADULT IP CONSULT  NUTRITION SERVICES ADULT IP CONSULT  PHARMACY LIAISON FOR MEDICATION COVERAGE CONSULT  IP RESPIRATORY CARE CHRONIC PULMONARY DISEASE SPECIALIST  CARE MANAGEMENT / SOCIAL WORK IP CONSULT  IP RESPIRATORY CARE CHRONIC PULMONARY DISEASE SPECIALIST  NURSING TO CONSULT FOR VASCULAR ACCESS CARE IP CONSULT    Code Status   Full Code       The patient was discussed with Dr. Bk Kwong MD  [address] 7D MED SURG  [address]  [place] [address]  Phone: [phone]  ______________________________________________________________________    Physical Exam   Vital Signs: Temp: 97.7  F (36.5  C) Temp src: Oral BP: (!) 150/95 Pulse: 62   Resp: 16 SpO2: 96 % O2 Device: None (Room air) Oxygen Delivery: 1 LPM  Weight: 83 lbs 0 oz  GENERAL APPEARANCE: pleasant, cachectic appearing, resting comfortably in bed, no acute distress  HEENT: Normocephalic and atraumatic.   LUNGS: Clear to auscultation bilaterally without wheezes, rhonchi, or rales.  HEART: RRR with normal S1 and S2. Soft heart sounds. No murmurs, gallops, or rubs.  ABDOMEN: Soft, nontender, nondistended.   EXTREMITIES: right lower extremity edema from below the ankle, nontender without surrounding erythema. Peripheral pedal pulses palpated.   SKIN: No rashes on visible skin  NEUROLOGIC: Moving all extremities equally       Primary Care Physician   Lisa Martinez    Discharge Orders      Basic metabolic panel     Reason for your hospital stay    You presented for 2 days of nausea, vomiting, diarrhea, and fatigue. You were found to be COVID positive and required oxygen therapy during your initial hospitalization. Additionally, your kidney function was elevated initially but this began trending to normal with adequate fluids. We treated your with an anti-viral medication and steroid. We recommend continued symptomatic management at home and your symptoms should improve over the next few days. Continue to drink fluids and eat when able, including your strawberry ensures to help get you stronger. We recommend HOLDING your torsemide and lisinopril over the weekend to allow your kidneys and body recover from the illness, you can resume on Monday (9/4) if you are feeling better. For the next three days you will take a higher dose of hydralazine (25 mg three times daily) through 9/3 and the resume your normal dose of 10mg three times daily.     You should follow up with your primary doctor within 1 week for labs, discuss blood pressure medications, and to discuss outpatient sleep study and inhaler options for further work up for your COPD.     Activity    Your activity upon discharge: activity as tolerated     Follow Up and recommended labs and tests    Labs scheduled for 9/6 (Wednesday)  Follow up with your PCP within 1-2 weeks  Follow up with your cardiologist 9/6  Follow up with your nephrologist on 9/25     Diet    Follow this diet upon discharge: resume your regular diet, continue to eat high protein meals and ensure to regain strength       Significant Results and Procedures   Most Recent 3 CBC's:  Recent Labs   Lab Test 09/01/23  0544 08/31/23  0618 08/30/23  1149   WBC 3.7* 3.0* 4.8   HGB 10.6* 10.1* 10.8*   MCV 86 85 87    211 252     Most Recent 3 BMP's:  Recent Labs   Lab Test 09/01/23  0544 08/31/23  0618 08/30/23  1149     138 138   POTASSIUM 5.1 4.8 4.2   CHLORIDE 103 105 98   CO2 24 22 25   BUN 60.6* 45.0* 47.7*   CR 2.10* 2.12* 2.42*   ANIONGAP 10 11 15   KAYKAY 8.7* 8.6* 9.4   * 93 104*     Most Recent D-dimer:  Recent Labs   Lab Test 08/31/23  0618   DD 11.97*   ,   Results for orders placed or performed during the hospital encounter of 08/30/23   XR Chest Port 1 View    Narrative    EXAM: XR CHEST PORT 1 VIEW  8/30/2023 12:56 PM     HISTORY:  covid+ sob and n/v and weakness       COMPARISON:  CT 8/21/2023. Chest radiograph 3-23    TECHNIQUE: 2 portable AP semiupright views of the chest    FINDINGS:   The trachea is midline. The cardiomediastinal silhouette is within  normal limits. Atherosclerotic calcification of the aortic arch.  Redemonstrated tortuous aneurysmal thoracic aorta. Hyperinflated lungs  with upper lobe predominant emphysematous changes.. The pulmonary  vasculature is distinct. No appreciable pneumothorax or pleural  effusion. No focal airspace consolidation. No acute osseous  abnormality.      Impression    IMPRESSION: No acute airspace disease. Stable radiographic changes of  COPD. Enlarged tortuous descending thoracic aorta.    I have personally reviewed the examination and initial interpretation  and I agree with the findings.    MONIK CRANE MD         SYSTEM ID:  [id number]   US Lower Extremity Venous Duplex Right    Narrative    EXAMINATION: US LOWER EXTREMITY VENOUS DUPLEX RIGHT  8/30/2023 9:10 PM       CLINICAL HISTORY: Pt with swelling below R ankle, r/o DVT    COMPARISON: None        PROCEDURE COMMENTS: Ultrasound was performed of the deep venous system  of the right lower extremity using grayscale, color, and spectral  Doppler.    FINDINGS:  The right common femoral, greater saphenous origin, femoral,  popliteal, and deep calf veins are visualized and are patent. Venous  waveforms are normal. There is normal response to compression.  Heterogeneous hematoma of the right groin measuring  4.3 x 2.1 x 3.8 cm  without flow.      Impression    IMPRESSION:.  1. No deep vein thrombosis in the right lower extremity.  2. Hematoma of the right groin.    I have personally reviewed the examination and initial interpretation  and I agree with the findings.    VERNON PIMENTEL MD         SYSTEM ID:  [id number]   Echocardiogram Complete     Value    LVEF  50-55% (borderline)    [facility]    [id number]  [id number]  [id number]  [id number]^DARYL^MILADYS^MADAY     [facility]  Echocardiography Laboratory  [address] [place] [id number]     Name: ERASMO MERINO  MRN: [id number]  : 1950  Study Date: 2023 01:34 PM  Age: 72 yrs  Gender: Female  Patient Location: [facility]  Reason For Study: SOB, CHF, Myocarditis  Ordering Physician: MILADYS BRITO  Performed By: Jeffry Barrientos     BSA: 1.3 m2  Height: 61 in  Weight: 83 lb  HR: 64  BP: 138/83 mmHg  ______________________________________________________________________________  Procedure  Complete Portable Echo Adult. Optison (NDC #2694-4950-39) given intravenously.  Patient was given 6 ml mixture of 3 ml Optison and 6 ml saline. 3 ml wasted.  ______________________________________________________________________________  Interpretation Summary  Left ventricular function is decreased. The ejection fraction is 50-55%  (borderline).  Global right ventricular function is normal. The right ventricle is normal  size.  No significant valvular abnormalities.  There is mild dilation at the level of the sinuses of Valsalva and ascending  aorta when indexed to BSA (both 3.1 cm, indexed value 2.4 cm/m2).  IVC diameter >2.1 cm collapsing <50% with sniff suggests a high RA pressure  estimated at 15 mmHg or greater.  There is a moderate-sized pericardial effusion anteriorly. The maximal depth  is 1.6 cm adjacent to the right AV groove. There is no evidence of RA/RV  diastolic collapse. There is organizing material in the pericardial  space.  Collectively, there are no high-risk signs of tamponade.  This study was compared with the study from 07/23/2022. No significant changes  upon direct visual comparison.  ______________________________________________________________________________  Left Ventricle  Left ventricular size is normal. Mild concentric wall thickening consistent  with left ventricular hypertrophy is present. Left ventricular function is  decreased. The ejection fraction is 50-55% (borderline). There is thinning and  akinesis of the mid inferoseptal and mid anteroseptal segments. Left  ventricular diastolic function is indeterminate.     Right Ventricle  Global right ventricular function is normal. The right ventricle is normal  size.     Atria  Both atria appear normal.     Mitral Valve  The mitral valve is normal. Trace mitral insufficiency is present.     Aortic Valve  The aortic valve is tricuspid. On Doppler interrogation, there is no  significant stenosis or regurgitation.     Tricuspid Valve  The tricuspid valve is normal. Trace tricuspid insufficiency is present.  Pulmonary artery systolic pressure cannot be assessed.     Pulmonic Valve  The valve leaflets are not well visualized. Trace pulmonic insufficiency is  present.     Vessels  There is mild dilation at the level of the sinuses of Valsalva and ascending  aorta when indexed to BSA (both 3.1 cm, indexed value 2.4 cm/m2). IVC diameter  >2.1 cm collapsing <50% with sniff suggests a high RA pressure estimated at 15  mmHg or greater.     Pericardium  There is a moderate-sized pericardial effusion anteriorly. The maximal depth  is 1.6 cm adjacent to the right AV groove. There is no evidence of RA/RV  diastolic collapse. There is organizing material in the pericardial space.  Collectively, there are no high-risk signs of tamponade.     Compared to Previous Study  This study was compared with the study from 07/23/2022 . No significant  changes upon direct visual  comparison.  ______________________________________________________________________________  MMode/2D Measurements & Calculations     IVSd: 1.00 cm  LVIDd: 4.7 cm  LVIDs: 3.5 cm  LVPWd: 0.94 cm  FS: 25.5 %  LV mass(C)d: 156.0 grams  LV mass(C)dI: 120.0 grams/m2  Ao root diam: 3.1 cm  asc Aorta Diam: 3.1 cm  LVOT diam: 2.0 cm  LVOT area: 3.1 cm2  Ao root diam Index (cm/m2): 2.4  asc Aorta Diam Index (cm/m2): 2.4  RWT: 0.40     Doppler Measurements & Calculations  MV E max nicolas: 52.4 cm/sec  MV A max nicolas: 61.8 cm/sec  MV E/A: 0.85  MV dec slope: 244.0 cm/sec2  MV dec time: 0.21 sec  Ao V2 max: 142.4 cm/sec  Ao max P.1 mmHg  Ao V2 mean: 106.2 cm/sec  Ao mean P.0 mmHg  Ao V2 VTI: 31.4 cm  DALIA(I,D): 1.8 cm2  DALIA(V,D): 1.9 cm2  LV V1 max PG: 3.1 mmHg  LV V1 max: 87.5 cm/sec  LV V1 VTI: 18.3 cm     SV(LVOT): 56.5 ml  SI(LVOT): 43.5 ml/m2  PA V2 max: 88.5 cm/sec  PA max PG: 3.1 mmHg  PA acc time: 0.13 sec  AV Nicolas Ratio (DI): 0.61  DALIA Index (cm2/m2): 1.4  E/E' av.7  Lateral E/e': 8.6  Medial E/e': 10.7     ______________________________________________________________________________  Report approved by: Saúl Kiran 2023 04:41 PM           *Note: Due to a large number of results and/or encounters for the requested time period, some results have not been displayed. A complete set of results can be found in Results Review.       Discharge Medications   Discharge Medication List as of 2023  2:31 PM        CONTINUE these medications which have CHANGED    Details   hydrALAZINE (APRESOLINE) 10 MG tablet Take 2.5 tablets (25 mg) by mouth 3 times daily for 3 days, THEN 1 tablet (10 mg) 3 times daily for 10 days., Disp-270 tablet, R-0, E-Prescribe      lisinopril (ZESTRIL) 2.5 MG tablet Take 1 tablet (2.5 mg) by mouth daily, Disp-90 tablet, R-3, No Print OutResume on       torsemide (DEMADEX) 10 MG tablet Take 1 tablet (10 mg) by mouth daily Take an additional 10 MG every other day as needed.  Additional use as directed by [facility]., Disp-135 tablet, R-1, E-Prescribe           CONTINUE these medications which have NOT CHANGED    Details   acetaminophen (TYLENOL) 325 MG tablet Take 2 tablets (650 mg) by mouth every 4 hours as needed for other (For optimal non-opioid multimodal pain management to improve pain control.), Disp-100 tablet, R-3, E-Prescribe      albuterol (PROAIR HFA/PROVENTIL HFA/VENTOLIN HFA) 108 (90 Base) MCG/ACT inhaler Inhale 1-2 puffs into the lungs every 6 hours as needed for shortness of breath, wheezing or cough, Disp-18 g, R-4, E-PrescribePharmacy may dispense brand covered by insurance (Proair, or proventil or ventolin or generic albuterol inhaler)      ASPIRIN LOW DOSE 81 MG chewable tablet CHEW AND SWALLOW 1 TABLET (81 MG) BY MOUTH DAILY, Disp-90 tablet, R-0, E-Prescribe      atorvastatin (LIPITOR) 80 MG tablet Take 1 tablet (80 mg) by mouth daily, Disp-90 tablet, R-3, E-Prescribe      calcium carbonate-vitamin D (CALTRATE) 600-10 MG-MCG per tablet TAKE ONE TABLET BY MOUTH TWICE A DAY, Disp-120 tablet, R-0, E-PrescribeDue for annual wellness exam      clopidogrel (PLAVIX) 75 MG tablet Take 1 tablet (75 mg) by mouth daily, Disp-90 tablet, R-3, E-Prescribe      esomeprazole (NEXIUM) 20 MG DR capsule Take 1 capsule (20 mg) by mouth every morning (before breakfast) Take 30-60 minutes before eating., Disp-30 capsule, R-5, E-Prescribe      fluticasone (FLONASE) 50 MCG/ACT nasal spray Spray 1 spray into both nostrils daily, Disp-18.2 mL, R-3, E-Prescribe      isosorbide mononitrate (IMDUR) 60 MG 24 hr tablet Take 1 tablet (60 mg) by mouth daily, Disp-90 tablet, R-2, E-Prescribe      Lactobacillus-Inulin ([facility] DIGESTIVE HEALTH) CAPS TAKE ONE CAPSULE BY MOUTH ONCE DAILY (DUE FOR PHYSICAL IN MARCH), Disp-90 capsule, R-3, E-Prescribe      metoprolol tartrate (LOPRESSOR) 100 MG tablet Take 1 tablet (100 mg) by mouth daily, Disp-90 tablet, R-3, E-Prescribe      Nutritional Supplements  (ENSURE CLEAR) LIQD Take 1 Bottle by mouth daily, Disp-396 mL, R-11, Local Print      oxyCODONE (ROXICODONE) 5 MG tablet Take 1 tablet (5 mg) by mouth every 4 hours as needed for severe pain, Disp-12 tablet, R-0, E-Prescribe      predniSONE (DELTASONE) 5 MG tablet Take 1 tablet (5 mg) by mouth daily, Disp-90 tablet, R-3, E-Prescribe      psyllium (METAMUCIL/KONSYL) 58.6 % powder Take by mouth every morning, Historical      tacrolimus (GENERIC EQUIVALENT) 0.5 MG capsule Take 4 capsules (2 mg) by mouth 2 times daily, Disp-240 capsule, R-11, E-Prescribe           STOP taking these medications       sirolimus (GENERIC EQUIVALENT) 2 MG tablet Comments:   Reason for Stopping:             Allergies   Allergies   Allergen Reactions    Blood Transfusion Related (Informational Only) Other (See Comments)     Patient has a history of a clinically significant antibody against RBC antigens.  A delay in compatible RBCs may occur.    Tramadol-Acetaminophen Nausea and Vomiting and Hives    Hydrocodone Nausea and Vomiting and Hives

## 2023-09-01 NOTE — SUMMARY OF CARE
Discharge to: home w/ family   Transportation: daughter available to provide ride   Time: 1510  Prescriptions: sent to home pharmacy   Belongings: all belongings accounted for and sent w/ patient   Access: PIV removed   Care plan and education discontinued: yes  Paperwork: AVS printed and gone through w/ patient. Education given and all questions answered at this time.

## 2023-09-01 NOTE — PROGRESS NOTES
Care Management Discharge Note    Discharge Date: 09/01/2023  Discharge Disposition:  Home  Discharge Services:  N/a  Discharge DME:  Nebulizer/Inhaler (PTA albuterol)  Discharge Transportation: family or friend will provide  Private pay costs discussed: Not applicable  Does the patient's insurance plan have a 3 day qualifying hospital stay waiver?  No  PAS Confirmation Code:  n/a  Patient/family educated on Medicare website which has current facility and service quality ratings:  n/a  Education Provided on the Discharge Plan:  yes  Persons Notified of Discharge Plans: pt; family; bedside RN; Charge RN  Patient/Family in Agreement with the Plan:      Handoff Referral Completed: Yes IHO    Additional Information:  PT to discharge this afternoon with no new needs as O2 has been weaned appropriately, see 6 min walk test; PTA respiratory Tx to be resumed. RNCC called bedside phone, which went unanswered; Successful contact via cell phone to confirm pt had successfully returned home. RNCC confirmed transportation is had via friends/family. RNCC to conclude following pt at this time.       Colby Sanders RN BSN  7D RNCC  Phone: [phone]  Pager: [phone]    For Weekend & Holiday on call RN Care Coordinator:  (Tasks: Home care, home infusion, medical equipment, transportation, IMM & ROWELL forms, etc.)  [place] (0800 - 1630) Saturday and Sunday    Units: 5A, 5B, & 5C: [phone]    Units: 6B, 6C, 6D: [phone]    Units: 7A, 7B, 7C, 7D: [phone]    Units: 6A/ICU : [phone]    [facility] [id number][place] Saturday and [place]    Units: 6 Med/Surg, 8A, 10 ICU, & Pediatric Units: [phone]    Units: 5 Ortho, 5Med/Surg & WB ED: [phone]

## 2023-09-01 NOTE — PROGRESS NOTES
"SIX MINUTE WALK TEST    2023    Maribel Yang MRN# [id number]   YOB: 1950 Age: 72 year old     Height: 5' 1\"  Weight (lbs): 83 lbs 0 oz Weight (kg): 37.6 kg (actual weight)    Supplemental oxygen during the test: No,     Oxygen Appliance: None    Oximetry: Finger Probe    Gait Aid: None     Pre-test Post-test   Time 12:35pm 12:44pm   Blood Pressure (mm Hg) 141/81 165/96   Heart rate (bpm) 58 68   Rated Perceived Dyspnea (Nikhil Scale) 0.5 -- Very, very slight shortness of breath  1 -- Very mild shortness of breath    Rated Perceived Exertion (Nikhil Scale) 3 -- Moderate  3 -- Moderate    SpO2 (%) 94 92     Total distance walked in 6 minutes: 40 feet, 12.19 meters    Paused during test?No  Number of pauses: 0  Total Time stopped: 0    Stopped test before 6 minutes? No  Time completed: 12:44pm  Distance: 0  Reason: COVID could not leave room. Walked around room.    Did the patient experience any pain or discomfort during the test? No  Pain Ratin/10  Pain Description: none  Comments: none    Oxygen Titration Required: No  Resting oxygen requirement: 94 Liters on None  Exercise oxygen requirement: 92 Liters on None    Performing Staff: Anastacia Weathers       "

## 2023-09-01 NOTE — PLAN OF CARE
Goal Outcome Evaluation:      Plan of Care Reviewed With: patient    Overall Patient Progress: improvingOverall Patient Progress: improving    Outcome Evaluation: 1900-0730: AOx4. VSS with exception of elevated BP on RA. PIERCE with dry, nonproductive cough. LS clear in upper lobes bilaterally, diminished in lower lobes. On continuous pulse ox. Up to bedside commode independently. No c/o of loose stools tonight. Remdesivir completed after new PIV was placed in left forearm, currently SL. Encouraging fluid intake. HTN this morning but did not reach parameters to notify provider, AM hydralazine given early per patient request. Continue with enteric/droplet precautions for covid and Cdiff. Will continue with POC.

## 2023-09-04 NOTE — TELEPHONE ENCOUNTER
[place] calling, with c2c on file.    Caller asks for COVID isolation guidelines.  Pt was admitted on 8/30 and discharged on 9/1 due to COVID.    Onset of symptoms:   Caller: 8/27  Pt: 8/28    Caller finished Paxlovid.  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   FNA advised that pt and wife can isolate together (cohort isolation).They just both need to wear a mask when out in the public through 9/7.  If would like to test, pt and caller have the option to test after 9/8.  Advised that they can remain COVID positive for up to 3 months. Call back and retest if COVID/flu-like symptoms develop.    Caller verbalized understanding.    Roz Dias RN/[place] Nurse Advisor        Reason for Disposition    Health Information question, no triage required and triager able to answer question    Protocols used: Information Only Call - No Triage-A-

## 2023-09-04 NOTE — TELEPHONE ENCOUNTER
Nurse Triage SBAR    Situation:   -covid hospitalization follow up    Background:   - calling, It is okay to leave a detailed message at this number.   -consent to communicate is on file    Assessment:   -we reviewed care the care that she had received in the hospital (remdesivir)    Recommendation:   -call back with any questions or concerns    FORTINO ZIMMER RN on 9/4/2023 at 5:11 PM     Reason for Disposition   Health Information question, no triage required and triager able to answer question    Protocols used: Information Only Call - No Triage-A-

## 2023-09-05 PROBLEM — R53.1 WEAKNESS: Status: ACTIVE | Noted: 2023-01-01

## 2023-09-05 PROBLEM — R06.02 SHORTNESS OF BREATH: Status: ACTIVE | Noted: 2023-01-01

## 2023-09-05 PROBLEM — E83.42 HYPOMAGNESEMIA: Status: ACTIVE | Noted: 2023-01-01

## 2023-09-05 NOTE — LETTER
9/Transition Communication Hand-off for Care Transitions to Next Level of Care Provider    Name: Maribel Yang  : 1950  MRN #: [id number]  Primary Care Provider: Lisa Maritnez     [facility] [address] [facility]  [address]  [facility] [address]     Reason for Hospitalization:  Shortness of breath [R06.02]  Hypomagnesemia [E83.42]  Weakness [R53.1]  Lab test positive for detection of COVID-19 virus [U07.1]  Admit Date/Time: 2023  9:25 AM  Discharge Date: 9/10/2023  Payor Source: Payor: [facility] / Plan: [facility] MEDICARE / Product Type: HMO /     Reason for Communication Hand-off Referral: Multiple providers/specialties    Discharge Plan:       Concern for non-adherence with plan of care:   Y/N no    Discharge Needs Assessment:  Needs      Flowsheet Row Most Recent Value   Equipment Currently Used at Home walker, rolling            Follow-up plan:    Future Appointments   Date Time Provider Department Center   2023 11:00 AM Rob Salvador PT [facility]   2023  3:35 PM [facility]   2024  1:00 PM  [facility]   2024  1:30 PM Angelica Ellison PA-C [facility]       Any outstanding tests or procedures:              Key Recommendations:  Home with home care RN/PT through [facility]    Radha Leblanc RN    AVS/Discharge Summary is the source of truth; this is a helpful guide for improved communication of patient story

## 2023-09-05 NOTE — ED TRIAGE NOTES
Coming in with increasing SOA. Recent hospitalization for Covid. Also, having abdominal pain.     Triage Assessment       Row Name 09/05/23 0921       Triage Assessment (Adult)    Airway WDL WDL       Respiratory WDL    Respiratory WDL X;rhythm/pattern;cough    Rhythm/Pattern, Respiratory shortness of breath    Cough Frequency frequent       Skin Circulation/Temperature WDL    Skin Circulation/Temperature WDL WDL       Cardiac WDL    Cardiac WDL WDL

## 2023-09-05 NOTE — ED PROVIDER NOTES
ED PROVIDER NOTE  September 5, 2023  History     Chief Complaint   Patient presents with    Shortness of Breath     HPI  Maribel Yang is a 72 year old female who with a history significant for CAD with prior inferior STEMI status post right RCA stent, pericardial effusion without tamponade, chronic heart failure with recovered EF, COPD infection recent hospitalization secondary to COVID-19 infection.  She returns today to the emergency department with progressive worsening shortness of breath and generalized weakness.  Patient reports she was feeling okay when she left the hospital several days ago however today was too weak to even get to the bathroom.  She does report intermittent dry cough.  She reports feeling chilled without fevers.  Denies chest pain, palpitations.  No worsening of peripheral edema.  Patient reports no fall or trauma.  She did report mild abdominal discomfort nonsevere no obvious exacerbating symptoms.  Has been taking medication as prescribed.      Past Medical History  Past Medical History:   Diagnosis Date    Abnormal coagulation profile     p [id number] heterozygote     Age-related osteoporosis without current pathological fracture 06/22/2019    Anemia     Antiplatelet or antithrombotic long-term use     ASCUS with positive high risk HPV 2007, 2015    + HPV 56, 54,& 6, colp - TAL III, Leep =TAL II    Basal cell carcinoma     Congestive heart failure, unspecified HF chronicity, unspecified heart failure type (H) 06/22/2021    COPD (chronic obstructive pulmonary disease) (H)     Depressive disorder 07/2015    Heart attack (H)     History of blood transfusion     Hypertension     Immunosuppressed status (H)     due meds    Kidney replaced by transplant 09/2004    Living donor recipient,  Rejection 7/2005    LSIL (low grade squamous intraepithelial lesion) on Pap smear 04/2013    +HPV 33 or 45, 61      PAD (peripheral artery disease) (H)     PONV (postoperative nausea and vomiting)      Squamous cell lung cancer (H)     Thrombosis of leg 1967    Unspecified disorder of kidney and ureter     X-linked dominant Alport's syndrome.     Past Surgical History:   Procedure Laterality Date    BIOPSY      Kidney, Lung, Breast    BIOPSY ANAL N/A 03/14/2018    Procedure: BIOPSY ANAL;;  Surgeon: Shabbir Leo MD;  [facility]    BYPASS GRAFT FEMORAL FEMORAL Bilateral 04/25/2021    Procedure: Axplantation infected graft, femoral to femoral bypass with cadaveric artery, bilateral SATORIUS MUSCLE FLAP CREATION, evacuation of absece, vacuum closure;  Surgeon: Raven Lewis MD;  [facility]    COLONOSCOPY      COLONOSCOPY N/A 08/09/2017    Procedure: COMBINED COLONOSCOPY, SINGLE OR MULTIPLE BIOPSY/POLYPECTOMY BY BIOPSY;;  Surgeon: Sushil Hyatt MD;  Location: [facility]    COLONOSCOPY N/A 7/28/2022    Procedure: COLONOSCOPY, WITH BIOPSY;  Surgeon: Moise Corcoran MD;  Location:  GI    COLPOSCOPY,LOOP ELECTRD CERVIX EXCIS  03/11/2008    TAL II    CONIZATION LEEP  07/17/2013    Procedure: CONIZATION LEEP;;  Surgeon: Liliana Renteria MD;  [facility]    CONIZATION LEEP N/A 08/17/2016    Procedure: CONIZATION LEEP;  Surgeon: Liliana Renteria MD;  [facility]    CV CORONARY ANGIOGRAM N/A 04/06/2021    Procedure: CV CORONARY ANGIOGRAM;  Surgeon: Sincere Rosas MD;  Location:  [facility]    CV CORONARY ANGIOGRAM N/A 04/25/2021    Procedure: Coronary Angiogram;  Surgeon: Sincere Rosas MD;  Location:  [facility]    CV PCI STENT DRUG ELUTING N/A 04/06/2021    Procedure: Percutaneous Coronary Intervention Stent Drug Eluting;  Surgeon: Sincere Rosas MD;  Location:  [facility]    ENDOVASCULAR REPAIR ANEURYSM AORTOILIAC Bilateral 04/06/2021    Procedure: Endovascular Abdominal Aortic Aneurysm Repair with Aortouniiliac Device and Femoral-Femoral Artery Bypass with 5yxZ09pm Artegraft;  Surgeon: Josias  Abena STONE MD;  [facility]    ESOPHAGOSCOPY, GASTROSCOPY, DUODENOSCOPY (EGD), COMBINED N/A 2/2/2023    Procedure: ESOPHAGOGASTRODUODENOSCOPY, WITH BIOPSY;  Surgeon: Yazan Heredia MD;  Location:  GI    EXAM UNDER ANESTHESIA ANUS  07/15/2014    Procedure: EXAM UNDER ANESTHESIA ANUS;  Surgeon: Radha Musa MD;  [facility]    EXAM UNDER ANESTHESIA ANUS N/A 03/14/2018    Procedure: EXAM UNDER ANESTHESIA ANUS;  Anal Exam Under Anesthesia With Excision of anal lesion, proctoscopy;  Surgeon: Shabbir Leo MD;  [facility]    EYE SURGERY      GENITOURINARY SURGERY      IR OR ANGIOGRAM  04/06/2021    IRRIGATION AND DEBRIDEMENT GROIN Right 04/24/2021    Procedure: IRRIGATION AND DEBRIDEMENT, INGUINAL REGION, I & D PF RIGHT GROIN;  Surgeon: Raven Lewis MD;  [facility]    IRRIGATION AND DEBRIDEMENT GROIN N/A 04/26/2021    Procedure: IRRIGATION AND DEBRIDEMENT, INGUINAL REGION, PLACEMENT OF VERAFLO WOUND VAC, WOUND WASHOUT,;  Surgeon: Raven Lewis MD;  [facility]    LASER CO2 EXCISE VULVA WIDE LOCAL  07/15/2014    Procedure: LASER CO2 EXCISE VULVA WIDE LOCAL;  Surgeon: Liliana Renteria MD;  [facility]    LASER CO2 VAGINA  07/17/2013    Procedure: LASER CO2 VAGINA;;  Surgeon: Liliana Renteria MD;  [facility]    LASER CO2 VAGINA N/A 09/25/2018    Procedure: LASER CO2 VAGINA;  Exam Under Anesthesia, CO2 Laser Ablation of Upper Vagina and Cervix;  Surgeon: Pati Garcia MD;  [facility]    MICROSCOPY ANAL  07/17/2013    Procedure: MICROSCOPY ANAL;  Anal Microscopy,  EUA vagina,Colposcopy Of Vagina And Vulva, Vaginal Biopsies, Omniguide Co2 Laser To Vagina and vulva, Loop Electrosurgical Excision Procedure To Cervix;  Surgeon: Radha Musa MD;  [facility]    MICROSCOPY ANAL  07/15/2014    Procedure: MICROSCOPY ANAL;  Surgeon: Radha Musa MD;  [facility]    PICC DOUBLE LUMEN PLACEMENT Right 04/30/2021    39cm, Basilic  vein    PSEUDOANEURYSM REPAIR Right 6/27/2023    Procedure: RIGHT FEMORAL TO PROFUNDA FEMORIS ARTERY BYPASS WITH cadaveric femoral-popliteal artery, REPAIR OF RIGHT GROIN PSEUDOANEURYSM;  Surgeon: Abena Ferrara MD;  [facility]    TRANSESOPHAGEAL ECHOCARDIOGRAM INTRAOPERATIVE N/A 04/28/2021    Procedure: ECHOCARDIOGRAM, TRANSESOPHAGEAL, INTRAOPERATIVE;  Surgeon: GENERIC ANESTHESIA PROVIDER;  Location: [facility]    VASCULAR SURGERY      Thrombectomy    [facility] NONSPECIFIC PROCEDURE      Thrombectomy    [facility] NONSPECIFIC PROCEDURE  1955 and 1959    Bilater eye surgery - correction for crossed eyes    [facility] NONSPECIFIC PROCEDURE  1998    oopherectomy L    [facility] NONSPECIFIC PROCEDURE  1967    open kidney biopsy - L    [facility] TRANSPLANTATION OF KIDNEY  09/2004    recipient -- done at [facility]     No current outpatient medications on file.    Allergies   Allergen Reactions    Blood Transfusion Related (Informational Only) Other (See Comments)     Patient has a history of a clinically significant antibody against RBC antigens.  A delay in compatible RBCs may occur.    Tramadol-Acetaminophen Nausea and Vomiting and Hives    Hydrocodone Nausea and Vomiting and Hives     Family History  Family History   Problem Relation Age of Onset    Diabetes Father     Alcohol/Drug Father     Arthritis Father     Hypertension Father     Lipids Father         high cholesterol    Arthritis Mother     Diabetes Mother     Depression Mother     Heart Disease Mother     Neurologic Disorder Mother     Obesity Mother     Psychotic Disorder Mother     Thyroid Disease Mother     Hypertension Mother     Gynecology Sister         Precancerous cell removal from cervix at age 45    Depression Sister     Allergies Sister     Alcohol/Drug Sister     Neurologic Disorder Sister     Cerebrovascular Disease Paternal Grandmother     Diabetes Paternal Grandmother     Alcohol/Drug Son     Colon Polyps Sister     Breast Cancer Niece     Other Cancer Sister         Cervical     "Obesity Sister     Depression Sister     Substance Abuse Son     Substance Abuse Sister             Depression Sister     Asthma Other     Colon Cancer No family hx of     Crohn's Disease No family hx of     Ulcerative Colitis No family hx of     Melanoma No family hx of     Skin Cancer No family hx of      Social History   Social History     Tobacco Use    Smoking status: Some Days     Packs/day: 0.30     Years: 35.00     Pack years: 10.50     Types: Cigarettes     Start date: 1967     Last attempt to quit: 3/1/2021     Years since quittin.5    Smokeless tobacco: Never    Tobacco comments:     States smokes once in a while   Vaping Use    Vaping Use: Never used   Substance Use Topics    Alcohol use: Not Currently     Comment: rarely    Drug use: Not Currently     Types: Marijuana     Comment: occasional use         A medically appropriate review of systems was performed with pertinent positives and negatives noted in the HPI, and all other systems negative.      Physical Exam   BP: (!) 164/100  Pulse: 92  Temp: 98.1  F (36.7  C)  Resp: 24  Height: 154.9 cm (5' 1\")  Weight: 37.6 kg (83 lb)  SpO2: 95 %      Physical Exam  Vitals and nursing note reviewed.   Constitutional:       General: She is in acute distress.      Appearance: Normal appearance. She is not ill-appearing, toxic-appearing or diaphoretic.   HENT:      Head: Normocephalic and atraumatic.      Right Ear: External ear normal.      Left Ear: External ear normal.      Nose: Nose normal. No congestion.      Mouth/Throat:      Mouth: Mucous membranes are moist.      Pharynx: Oropharynx is clear. No oropharyngeal exudate.   Eyes:      Extraocular Movements: Extraocular movements intact.      Conjunctiva/sclera: Conjunctivae normal.      Pupils: Pupils are equal, round, and reactive to light.   Cardiovascular:      Rate and Rhythm: Normal rate.      Pulses: Normal pulses.      Heart sounds: Normal heart sounds. No murmur heard.     No friction " rub.   Pulmonary:      Effort: Pulmonary effort is normal. No respiratory distress.      Breath sounds: No stridor. Wheezing present. No rhonchi or rales.   Abdominal:      General: Abdomen is flat. There is no distension.      Tenderness: There is no abdominal tenderness. There is no guarding or rebound.   Musculoskeletal:         General: No deformity or signs of injury. Normal range of motion.      Cervical back: Normal range of motion.      Right lower leg: Edema present.   Skin:     General: Skin is warm.      Capillary Refill: Capillary refill takes less than 2 seconds.      Coloration: Skin is not pale.      Findings: No bruising or erythema.   Neurological:      General: No focal deficit present.      Mental Status: She is alert.      Cranial Nerves: No cranial nerve deficit.      Motor: No weakness.   Psychiatric:         Mood and Affect: Mood normal.         Behavior: Behavior normal.         ED Course     ED Course as of 23 1153   Tue Sep 05, 2023   1518 72F here w dyspnea/weakness and recent +COVID  - COPD exacerbation, got steroids, mag, nebs  - cxr w/o infiltrate     Procedures         Results for orders placed or performed during the hospital encounter of 23 (from the past 24 hour(s))   Echo Limited   Result Value Ref Range    LVEF  50-55% (borderline)     Narrative    [id number]  [id number]  [id number]  [id number]^ROBSON^SUKHWINDER     [facility],[place]  Echocardiography Laboratory  [address] [place] [id number]     Name: ERASMO MERINO  MRN: [id number]  : 1950  Study Date: 2023 04:45 PM  Age: 72 yrs  Gender: Female  Patient Location: [facility]  Reason For Study: Dyspnea, Pericardial effusion  Ordering Physician: SUKHWINDER CHANCE  Performed By: Nancy Cohn     BSA: 1.3 m2  Height: 61 in  Weight: 83 lb  HR: 71  BP: 123/83 mmHg  ______________________________________________________________________________  Procedure  Limited Portable Echo Adult.  Poor acoustic windows.  ______________________________________________________________________________  Interpretation Summary  Left ventricular function is decreased. The ejection fraction is 50-55%  (borderline).  Global right ventricular function is normal.  Dilation of the inferior vena cava is present with abnormal respiratory  variation in diameter.  There is a mild to moderate-sized pericardial effusion anteriorly. The maximal  depth is 1.6 cm adjacent to the right AV groove. There is no evidence of RA/RV  diastolic collapse. There is organizing material in the pericardial space.  Collectively, there are no high-risk signs of tamponade.     This study was compared with the study from 8/30/23. Minimal interval change.  ______________________________________________________________________________  Left Ventricle  Left ventricular function is decreased. The ejection fraction is 50-55%  (borderline). Akinesis and thinning of the mid anteroseptal segment.     Right Ventricle  The right ventricle is normal size. Global right ventricular function is  normal.     Mitral Valve  The mitral valve is normal.     Vessels  Dilation of the inferior vena cava is present with abnormal respiratory  variation in diameter.     Pericardium  There is a mild to moderate-sized pericardial effusion anteriorly. The maximal  depth is 1.6 cm adjacent to the right AV groove. There is no evidence of RA/RV  diastolic collapse. There is organizing material in the pericardial space.  Collectively, there are no high-risk signs of tamponade.     Compared to Previous Study  This study was compared with the study from 8/30/23 .  ______________________________________________________________________________  Report approved by: Saúl Christina 09/05/2023 05:18 PM     ______________________________________________________________________________      Symptomatic Influenza A/B, RSV, & SARS-CoV2 PCR (COVID-19) Nasopharyngeal    Specimen:  Nasopharyngeal; Swab   Result Value Ref Range    Influenza A PCR Negative Negative    Influenza B PCR Negative Negative    RSV PCR Negative Negative    SARS CoV2 PCR Positive (A) Negative    Narrative    Testing was performed using the Xpert Xpress CoV2/Flu/RSV Assay on the Cepheid GeneXpert Instrument. This test should be ordered for the detection of SARS-CoV-2, influenza, and RSV viruses in individuals who meet clinical and/or epidemiological criteria. Test performance is unknown in asymptomatic patients. This test is for in vitro diagnostic use under the FDA EUA for laboratories certified under CLIA to perform high or moderate complexity testing. This test has not been FDA cleared or approved. A negative result does not rule out the presence of PCR inhibitors in the specimen or target RNA in concentration below the limit of detection for the assay. If only one viral target is positive but coinfection with multiple targets is suspected, the sample should be re-tested with another FDA cleared, approved, or authorized test, if coinfection would change clinical management. This test was validated by the [facility]. These laboratories are certified under the Clinical Laboratory Improvement Amendments of 1988 (CLIA-88) as qualified to perform high complexity laboratory testing.   Magnesium   Result Value Ref Range    Magnesium 1.9 1.7 - 2.3 mg/dL   Basic metabolic panel   Result Value Ref Range    Sodium 137 136 - 145 mmol/L    Potassium 4.6 3.4 - 5.3 mmol/L    Chloride 102 98 - 107 mmol/L    Carbon Dioxide (CO2) 24 22 - 29 mmol/L    Anion Gap 11 7 - 15 mmol/L    Urea Nitrogen 47.9 (H) 8.0 - 23.0 mg/dL    Creatinine 1.89 (H) 0.51 - 0.95 mg/dL    Calcium 9.3 8.8 - 10.2 mg/dL    Glucose 115 (H) 70 - 99 mg/dL    GFR Estimate 28 (L) >60 mL/min/1.73m2   CBC with platelets   Result Value Ref Range    WBC Count 5.8 4.0 - 11.0 10e3/uL    RBC Count 4.12 3.80 - 5.20 10e6/uL    Hemoglobin 10.8 (L) 11.7 - 15.7  g/dL    Hematocrit 34.4 (L) 35.0 - 47.0 %    MCV 84 78 - 100 fL    MCH 26.2 (L) 26.5 - 33.0 pg    MCHC 31.4 (L) 31.5 - 36.5 g/dL    RDW 17.1 (H) 10.0 - 15.0 %    Platelet Count 171 150 - 450 10e3/uL   Phosphorus   Result Value Ref Range    Phosphorus 3.6 2.5 - 4.5 mg/dL   Magnesium   Result Value Ref Range    Magnesium 1.9 1.7 - 2.3 mg/dL   D dimer quantitative   Result Value Ref Range    D-Dimer Quantitative 13.38 (H) 0.00 - 0.50 ug/mL FEU    Narrative    This D-dimer assay is intended for use in conjunction with a clinical pretest probability assessment model to exclude pulmonary embolism (PE) and deep venous thrombosis (DVT) in outpatients suspected of PE or DVT. The cut-off value is 0.50 ug/mL FEU.   CRP inflammation   Result Value Ref Range    CRP Inflammation 92.50 (H) <5.00 mg/L     *Note: Due to a large number of results and/or encounters for the requested time period, some results have not been displayed. A complete set of results can be found in Results Review.     Medications   lidocaine 1 % 0.1-1 mL (has no administration in time range)   lidocaine (LMX4) cream (has no administration in time range)   sodium chloride (PF) 0.9% PF flush 3 mL (3 mLs Intracatheter $Given 9/6/23 0827)   sodium chloride (PF) 0.9% PF flush 3 mL (has no administration in time range)   melatonin tablet 1 mg (has no administration in time range)   heparin ANTICOAGULANT injection 5,000 Units (5,000 Units Subcutaneous $Given 9/6/23 0865)   senna-docusate (SENOKOT-S/PERICOLACE) 8.6-50 MG per tablet 1 tablet (has no administration in time range)     Or   senna-docusate (SENOKOT-S/PERICOLACE) 8.6-50 MG per tablet 2 tablet (has no administration in time range)   ondansetron (ZOFRAN ODT) ODT tab 4 mg (has no administration in time range)     Or   ondansetron (ZOFRAN) injection 4 mg (has no administration in time range)   albuterol (PROVENTIL HFA/VENTOLIN HFA) inhaler (has no administration in time range)   aspirin (ASA) chewable tablet  81 mg (81 mg Oral $Given 9/6/23 0825)   atorvastatin (LIPITOR) tablet 80 mg (80 mg Oral $Given 9/6/23 0825)   calcium carbonate-vitamin D (CALTRATE) 600-10 MG-MCG per tablet 1 tablet (1 tablet Oral $Given 9/6/23 0825)   clopidogrel (PLAVIX) tablet 75 mg (75 mg Oral $Given 9/6/23 0825)   pantoprazole (PROTONIX) EC tablet 40 mg (40 mg Oral $Given 9/6/23 0825)   fluticasone (FLONASE) 50 MCG/ACT spray 1 spray (1 spray Both Nostrils Not Given 9/6/23 0852)   isosorbide mononitrate (IMDUR) 24 hr tablet 60 mg (60 mg Oral $Given 9/6/23 0825)   lisinopril (ZESTRIL) tablet 2.5 mg (2.5 mg Oral $Given 9/6/23 0825)   metoprolol tartrate (LOPRESSOR) tablet 100 mg (100 mg Oral $Given 9/6/23 0825)   magnesium oxide (MAG-OX) tablet 400 mg (400 mg Oral $Given 9/6/23 0825)   tacrolimus (GENERIC EQUIVALENT) capsule 2 mg (2 mg Oral $Given 9/6/23 0825)   torsemide (DEMADEX) tablet 10 mg (10 mg Oral $Given 9/6/23 0825)   ipratropium - albuterol 0.5 mg/2.5 mg/3 mL (DUONEB) neb solution 3 mL (3 mLs Nebulization $Given 9/6/23 0803)   acetaminophen (TYLENOL) tablet 650 mg (650 mg Oral $Given 9/5/23 2234)   dexAMETHasone (DECADRON) tablet 6 mg (6 mg Oral $Given 9/6/23 1105)   dexAMETHasone PF (DECADRON) injection 10 mg (10 mg Intravenous $Given 9/5/23 1051)   ipratropium - albuterol 0.5 mg/2.5 mg/3 mL (DUONEB) neb solution 3 mL (3 mLs Nebulization $Given 9/5/23 1051)   magnesium sulfate 2 g in 50 mL sterile water intermittent infusion (0 g Intravenous Stopped 9/5/23 1251)   potassium & sodium phosphates (NEUTRA-PHOS) Packet 1 packet (1 packet Oral $Given 9/6/23 3037)             Critical care was not performed.     Medical Decision Making  The patient's presentation was of moderate complexity (an acute complicated injury).    The patient's evaluation involved:  review of external note(s) from 3+ sources (see separate area of note for details)  review of 3+ test result(s) ordered prior to this encounter (see separate area of note for  details)  strong consideration of a test (see separate area of note for details) that was ultimately deferred    The patient's management necessitated high risk (a decision regarding hospitalization).    Assessments & Plan (with Medical Decision Making)     Maribel Yang is a 72 year old female who with a history significant for CAD with prior inferior STEMI status post right RCA stent, pericardial effusion without tamponade, chronic heart failure with recovered EF, COPD infection recent hospitalization secondary to COVID-19 infection.  She returns today to the emergency department with progressive worsening shortness of breath and generalized weakness.  Arrival patient to be alert, currently febrile and hemodynamic stable with modest systolic hypertension.  GCS currently 15.  No evidence of endorgan dysfunction clinically.  I have reviewed her extensive chart with multiple risk factors for dyspnea.  At bedside she does appear somewhat dyspneic speaking in near complete sentences but not complete.  SPO2 low 90s on room air at rest.  Etiology unclear at this time she does have some end expiratory wheezing I would favor persistent COPD exacerbation.  Patient did receive nebulized medication and steroids in the emergency department.  Broad differential including worsening of pericardial effusion with lower suspicion for this, persistent COPD, bacterial pneumonia on recent viral infection of the chest.  Less likely be empyema, PE.  She did have some peripheral edema imaged by ultrasound of the right lower extremity recently and had a chest CT this past month.  I do not believe she requires CTA at this time with low suspicion for PE.  Regarding weakness also broad lower suspicion for UTI or infectious source but possible.  I would plan for laboratory studies to assess for anemia, leukocytosis or electrolyte disturbance.  Patient has had progressive weakness now to the point she is not able to even make it to the  bathroom.  I suspect she will require readmission to the hospital for ongoing management.    Laboratory studies demonstrate no significant abnormality.  No obvious anemia or leukocytosis.  Chest x-ray demonstrate no focal consolidation.  With significant weakness now unable to perform ADLs we will plan to have the patient brought into the hospital and replete magnesium.  We will continue to treat presumed COPD exacerbation.  At this time I believe she is appropriate for floor level of care.    I have reviewed the nursing notes.    I have reviewed the findings, diagnosis, plan and need for follow up with the patient.    Current Discharge Medication List          Final diagnoses:   Shortness of breath   Weakness   Hypomagnesemia       SVEN MONTOYA MD    9/5/2023   [facility] EMERGENCY DEPARTMENT     Sven Montoya MD  09/05/23 150       Sven Montoya MD  09/06/23 115

## 2023-09-05 NOTE — H&P
I, Michele Lowe, was present with the medical/DAYANA student who participated in the service and in the documentation of the note.  I have verified the history and personally performed the physical exam and medical decision making.  I agree with the assessment and plan of care as documented in the note.      Michele Lowe, PGY2      [facility]    History and Physical - Medicine Service, [facility] TEAM 1       Date of Admission:  9/5/2023    Assessment & Plan      Maribel Yang is a 72 year old female admitted on 9/5/2023. She has a history of previous hypoxia in the setting of recent COVID-19 infection (08/30/23) treated with 4 day course of Remdesivir and Dexamethasone, pericardial effusion measured at 1,6cm w/o evidence of tamponade, Chronic heart failure with recovered ejection fraction, COPD, and kidney transplant admitted on 09/05/2023 to the ED for weakness and shortness of breath at rest.     # Confirmed COVID-19 infection    # Acute Hypoxic Respiratory Failure secondary to COVID-19 infection  Pt with confirmed COVID-19 infection (08/30/23) treated with 4 day course fo dexamethasone and remdesivir, dyspnea at rest, and non-productive cough on admission. Given the PE findings and lab values differential includes a most likely COVID-19 infection, less likely COPD exacerbation, and a cannot be missed cardiac tamponade. Given the subacute ongoing presentation of dyspnea, nonproductive cough along with PE findings the most likely etiology is residual COVID-19 symptoms d/t 10 day isolation period not being complete. COPD exacerbation is likely given diagnosis of COPD and associated dyspnea at rest, but is less likely given the O2 saturation above 93 percent, afebrile lab tests, absent leukocytosis, and unremarkable CXR.Cardiac tamponade is less likely but cannot be missed due to her history of pericardial effusion that recently expanded but is not supported by physical  exam and lab findings.  - Consider prednisone 40mg x5d depending on clinical course overnight to treat for CODP exacerbation  - Duonebs inhalers  - No indication for dexamethasone currently given no hypoxia  -Limited echocardiogram to reassess pericardial effusion  - Recheck COVID labs (CBC, BMP, D-dimer, CRP)   - Heparin for AC      #Weakness  #Hypomagnesemia and hypophosphatemia  Patient presented with muscle weakness. Mg 1.1 on admission, muscle weakness rapidly improved after Mg administration. Had been having diarrhea a few days PTA along with poor PO intake. Suspect muscle weakness in the setting of hypomagnesemia that improved with administration of 400 mg Magnesium Oxide  - Replete Mg and Phos PRN  - Nutrition consult  - OT/PT  - Monitor Magnesium and phosphorus levels daily       # Chronic heart failure with preserved ejection fraction: heart failure noted on problem list and last echo with EF >50%     #HTN  #Pericardial Effussion  Patient with history of recent pericardial effusion with minimal expansion 08/30, orthopnea, and Dyspnea at rest, bilateral lower extremity edema, regular rate and rhythm without chest pain. 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 Baseline Cr 1.8-2.0. On admission, Cr elevated to 2.42. BUN 47.7, normal potassium. Likely secondary to prerenal issues with GI symptoms and poor oral intake with COVID. No acute indications for dialysis.   - Transplant Nephrology Consult, appreciate recs  - Check tacro level in AM  - Monitor BUN and creatinine daily CMP      # Cachexia: Estimated body mass index is 15.68 kg/m  as calculated from the following:    Height as of this encounter: 1.549 m  "(5' 1\").    Weight as of this encounter: 37.6 kg (83 lb).              Diet: Combination Diet Regular Diet Adult    DVT Prophylaxis: Heparin SQ  Villavicencio Catheter: Not present  Fluids: None  Lines: None     Cardiac Monitoring: None  Code Status: Full Code         The patient's care was discussed with the attending Dr. José Miguel SMITH  Medical Student  Medicine Service, [facility] TEAM [address] [place] [address]  Securely message with [employer] (more info)  Text page via [email or web address] Paging/Directory   See signed in provider for up to date coverage information  ______________________________________________________________________    Chief Complaint   Dyspnea and associated weakness    History is obtained from the patient    History of Present Illness   Maribel Yang is a 72 year old female who has a history of COVID-19 infection on 08/30/23 treated with 3 days of dexamethasone and remdesivir admitted to the ED on 09/05/23 for dyspnea and weakness. Maribel typically does not leave her home, but was watching her grandson earlier during the week of 08/30 prior to the acute onset of initial COVID symptoms. Following admission and treatment for COVID from 08/30 - 09/01 she showed signs of improvement and ultimately was discharged. Soon after she began having associated symptoms of dyspnea at rest and weakness that did not improve prompting her to return to the ED on 09/05 to seek further care.      Past Medical History    Past Medical History:   Diagnosis Date    Abnormal coagulation profile     p [id number] heterozygote     Age-related osteoporosis without current pathological fracture 06/22/2019    Anemia     Antiplatelet or antithrombotic long-term use     ASCUS with positive high risk HPV 2007, 2015    + HPV 56, 54,& 6, colp - TAL III, Leep =TAL II    Basal cell carcinoma     Congestive heart failure, unspecified HF chronicity, unspecified heart failure type (H) 06/22/2021    COPD (chronic " obstructive pulmonary disease) (H)     Depressive disorder 07/2015    Heart attack (H)     History of blood transfusion     Hypertension     Immunosuppressed status (H)     due meds    Kidney replaced by transplant 09/2004    Living donor recipient,  Rejection 7/2005    LSIL (low grade squamous intraepithelial lesion) on Pap smear 04/2013    +HPV 33 or 45, 61      PAD (peripheral artery disease) (H)     PONV (postoperative nausea and vomiting)     Squamous cell lung cancer (H)     Thrombosis of leg 1967    Unspecified disorder of kidney and ureter     X-linked dominant Alport's syndrome.       Past Surgical History   Past Surgical History:   Procedure Laterality Date    BIOPSY      Kidney, Lung, Breast    BIOPSY ANAL N/A 03/14/2018    Procedure: BIOPSY ANAL;;  Surgeon: Shabbir Leo MD;  [facility]    BYPASS GRAFT FEMORAL FEMORAL Bilateral 04/25/2021    Procedure: Axplantation infected graft, femoral to femoral bypass with cadaveric artery, bilateral SATORIUS MUSCLE FLAP CREATION, evacuation of absece, vacuum closure;  Surgeon: Raven Lewis MD;  [facility]    COLONOSCOPY      COLONOSCOPY N/A 08/09/2017    Procedure: COMBINED COLONOSCOPY, SINGLE OR MULTIPLE BIOPSY/POLYPECTOMY BY BIOPSY;;  Surgeon: Sushil Hyatt MD;  [facility]    COLONOSCOPY N/A 7/28/2022    Procedure: COLONOSCOPY, WITH BIOPSY;  Surgeon: Moise Corcoran MD;  [facility]    COLPOSCOPY,LOOP ELECTRD CERVIX EXCIS  03/11/2008    TAL II    CONIZATION LEEP  07/17/2013    Procedure: CONIZATION LEEP;;  Surgeon: Liliana Renteria MD;  [facility]    CONIZATION LEEP N/A 08/17/2016    Procedure: CONIZATION LEEP;  Surgeon: Liliana Renteria MD;  [facility]    CV CORONARY ANGIOGRAM N/A 04/06/2021    Procedure: CV CORONARY ANGIOGRAM;  Surgeon: Sincere Rosas MD;  [facility]    CV CORONARY ANGIOGRAM N/A 04/25/2021    Procedure: Coronary Angiogram;  Surgeon: Sincere Rosas  MD Linda;  Location: [facility] CARDIAC CATH LAB    CV PCI STENT DRUG ELUTING N/A 04/06/2021    Procedure: Percutaneous Coronary Intervention Stent Drug Eluting;  Surgeon: Sincere Rosas MD;  Location: [facility] CARDIAC CATH LAB    ENDOVASCULAR REPAIR ANEURYSM AORTOILIAC Bilateral 04/06/2021    Procedure: Endovascular Abdominal Aortic Aneurysm Repair with Aortouniiliac Device and Femoral-Femoral Artery Bypass with [id number] Artegraft;  Surgeon: Abena Ferrara MD;  [facility]    ESOPHAGOSCOPY, GASTROSCOPY, DUODENOSCOPY (EGD), COMBINED N/A 2/2/2023    Procedure: ESOPHAGOGASTRODUODENOSCOPY, WITH BIOPSY;  Surgeon: Yazan Heredia MD;  Location:  GI    EXAM UNDER ANESTHESIA ANUS  07/15/2014    Procedure: EXAM UNDER ANESTHESIA ANUS;  Surgeon: Radha Musa MD;  [facility]    EXAM UNDER ANESTHESIA ANUS N/A 03/14/2018    Procedure: EXAM UNDER ANESTHESIA ANUS;  Anal Exam Under Anesthesia With Excision of anal lesion, proctoscopy;  Surgeon: Shabbir Leo MD;  [facility]    EYE SURGERY      GENITOURINARY SURGERY      IR OR ANGIOGRAM  04/06/2021    IRRIGATION AND DEBRIDEMENT GROIN Right 04/24/2021    Procedure: IRRIGATION AND DEBRIDEMENT, INGUINAL REGION, I & D PF RIGHT GROIN;  Surgeon: Raven Lewis MD;  [facility]    IRRIGATION AND DEBRIDEMENT GROIN N/A 04/26/2021    Procedure: IRRIGATION AND DEBRIDEMENT, INGUINAL REGION, PLACEMENT OF VERAFLO WOUND VAC, WOUND WASHOUT,;  Surgeon: Raven Lewis MD;  [facility]    LASER CO2 EXCISE VULVA WIDE LOCAL  07/15/2014    Procedure: LASER CO2 EXCISE VULVA WIDE LOCAL;  Surgeon: Liliana Renteria MD;  [facility]    LASER CO2 VAGINA  07/17/2013    Procedure: LASER CO2 VAGINA;;  Surgeon: Liliana Renteria MD;  [facility]    LASER CO2 VAGINA N/A 09/25/2018    Procedure: LASER CO2 VAGINA;  Exam Under Anesthesia, CO2 Laser Ablation of Upper Vagina and Cervix;  Surgeon: Pati Garcia MD;  Location:  OR     MICROSCOPY ANAL  07/17/2013    Procedure: MICROSCOPY ANAL;  Anal Microscopy,  EUA vagina,Colposcopy Of Vagina And Vulva, Vaginal Biopsies, Omniguide Co2 Laser To Vagina and vulva, Loop Electrosurgical Excision Procedure To Cervix;  Surgeon: Radha Musa MD;  [facility]    MICROSCOPY ANAL  07/15/2014    Procedure: MICROSCOPY ANAL;  Surgeon: Radha Musa MD;  [facility]    PICC DOUBLE LUMEN PLACEMENT Right 04/30/2021    39cm, Basilic vein    PSEUDOANEURYSM REPAIR Right 6/27/2023    Procedure: RIGHT FEMORAL TO PROFUNDA FEMORIS ARTERY BYPASS WITH cadaveric femoral-popliteal artery, REPAIR OF RIGHT GROIN PSEUDOANEURYSM;  Surgeon: Abena Ferrara MD;  [facility]    TRANSESOPHAGEAL ECHOCARDIOGRAM INTRAOPERATIVE N/A 04/28/2021    Procedure: ECHOCARDIOGRAM, TRANSESOPHAGEAL, INTRAOPERATIVE;  Surgeon: GENERIC ANESTHESIA PROVIDER;  [facility]    VASCULAR SURGERY      Thrombectomy    [facility] NONSPECIFIC PROCEDURE      Thrombectomy    [facility] NONSPECIFIC PROCEDURE  1955 and 1959    Bilater eye surgery - correction for crossed eyes    [facility] NONSPECIFIC PROCEDURE  1998    oopherectomy L    [facility] NONSPECIFIC PROCEDURE  1967    open kidney biopsy - L    [facility] TRANSPLANTATION OF KIDNEY  09/2004    recipient -- done at [facility]       Prior to Admission Medications   Prior to Admission Medications   Prescriptions Last Dose Informant Patient Reported? Taking?   ASPIRIN LOW DOSE 81 MG chewable tablet  Self No No   Sig: CHEW AND SWALLOW 1 TABLET (81 MG) BY MOUTH DAILY   Lactobacillus-Inulin ([facility] DIGESTIVE HEALTH) CAPS  Self No No   Sig: TAKE ONE CAPSULE BY MOUTH ONCE DAILY (DUE FOR PHYSICAL IN MARCH)   Nutritional Supplements (ENSURE CLEAR) LIQD  Self No No   Sig: Take 1 Bottle by mouth daily   acetaminophen (TYLENOL) 325 MG tablet  Self No No   Sig: Take 2 tablets (650 mg) by mouth every 4 hours as needed for other (For optimal non-opioid multimodal pain management to improve pain control.)   albuterol  (PROAIR HFA/PROVENTIL HFA/VENTOLIN HFA) 108 (90 Base) MCG/ACT inhaler  Self No No   Sig: Inhale 1-2 puffs into the lungs every 6 hours as needed for shortness of breath, wheezing or cough   atorvastatin (LIPITOR) 80 MG tablet  Self No No   Sig: Take 1 tablet (80 mg) by mouth daily   calcium carbonate-vitamin D (CALTRATE) 600-10 MG-MCG per tablet  Self No No   Sig: TAKE ONE TABLET BY MOUTH TWICE A DAY   clopidogrel (PLAVIX) 75 MG tablet  Self No No   Sig: Take 1 tablet (75 mg) by mouth daily   esomeprazole (NEXIUM) 20 MG DR capsule  Self No No   Sig: Take 1 capsule (20 mg) by mouth every morning (before breakfast) Take 30-60 minutes before eating.   fluticasone (FLONASE) 50 MCG/ACT nasal spray  Self No No   Sig: Spray 1 spray into both nostrils daily   hydrALAZINE (APRESOLINE) 10 MG tablet   No No   Sig: Take 2.5 tablets (25 mg) by mouth 3 times daily for 3 days, THEN 1 tablet (10 mg) 3 times daily for 10 days.   isosorbide mononitrate (IMDUR) 60 MG 24 hr tablet  Self No No   Sig: Take 1 tablet (60 mg) by mouth daily   lisinopril (ZESTRIL) 2.5 MG tablet   No No   Sig: Take 1 tablet (2.5 mg) by mouth daily   magnesium oxide (MAG-OX) 400 MG tablet   No No   Sig: Take 1 tablet (400 mg) by mouth daily for 83 days   metoprolol tartrate (LOPRESSOR) 100 MG tablet  Self No No   Sig: Take 1 tablet (100 mg) by mouth daily   oxyCODONE (ROXICODONE) 5 MG tablet  Self No No   Sig: Take 1 tablet (5 mg) by mouth every 4 hours as needed for severe pain   predniSONE (DELTASONE) 5 MG tablet  Self No No   Sig: Take 1 tablet (5 mg) by mouth daily   psyllium (METAMUCIL/KONSYL) 58.6 % powder  Self Yes No   Sig: Take by mouth every morning   tacrolimus (GENERIC EQUIVALENT) 0.5 MG capsule  Self No No   Sig: Take 4 capsules (2 mg) by mouth 2 times daily   torsemide (DEMADEX) 10 MG tablet   No No   Sig: Take 1 tablet (10 mg) by mouth daily Take an additional 10 MG every other day as needed. Additional use as directed by [facility] clinic.       Facility-Administered Medications: None           Physical Exam   GENERAL APPEARANCE: pleasant, cachectic appearing, resting comfortably in bed, no acute distress  HEENT: Normocephalic and atraumatic.   LUNGS: Clear to auscultation bilaterally without wheezes, rhonchi, or rales.  HEART: RRR with normal S1 and S2. Normal heart sounds. No murmurs, gallops, or rubs.  ABDOMEN: Soft, nontender, nondistended.   EXTREMITIES: bilateral lower extremity edema from below the ankle, nontender without surrounding erythema. Peripheral pedal pulses palpated.   SKIN: No rashes on visible skin  NEUROLOGIC: Oriented x3 with limited range of motion on exam  Vital Signs: Temp: 98.1  F (36.7  C) Temp src: Oral BP: 123/83 Pulse: 73   Resp: 26 SpO2: 94 % O2 Device: None (Room air)    Weight: 83 lbs 0 oz            Data     I have personally reviewed the following data over the past 24 hrs:    8.5  \   11.0 (L)   / 155     138 103 48.3 (H) /  119 (H)   3.8 23 1.74 (H) \     ALT: <5 AST: 18 AP: 108 (H) TBILI: 0.3   ALB: 3.3 (L) TOT PROTEIN: 6.1 (L) LIPASE: N/A     Trop: 39 (H) BNP: N/A

## 2023-09-06 NOTE — PROVIDER NOTIFICATION
Krystin 1 paged      Pt is hypertensive 164/108. She took her PTA HTN medications prior to coming to ED.

## 2023-09-06 NOTE — CONSULTS
Care Management Initial Consult    General Information  Assessment completed with: VM-chart review,    Type of CM/SW Visit: Initial Assessment    Primary Care Provider verified and updated as needed: Yes   Readmission within the last 30 days: previous discharge plan unsuccessful      Reason for Consult: discharge planning, other (see comments) (elevated risk score, readmissions within 7 days)  Advance Care Planning: Advance Care Planning Reviewed: no concerns identified          Communication Assessment  Patient's communication style: spoken language (English or Bilingual)             Cognitive  Cognitive/Neuro/Behavioral:                        Living Environment:   People in home: spouse  Padilla  Current living Arrangements: house      Able to return to prior arrangements: yes       Family/Social Support:  Care provided by: self, spouse/significant other, child(steve)  Provides care for: no one  Marital Status:   , Children, Other (specify) (Grandchildren)          Description of Support System: Supportive, Involved    Support Assessment: Adequate family and caregiver support, Adequate social supports    Current Resources:   Patient receiving home care services: No     Community Resources: Meals on Wheels, Financial/Insurance  Equipment currently used at home:    Supplies currently used at home: Nutritional Supplements    Employment/Financial:  Employment Status: retired        Financial Concerns: No concerns identified           Does the patient's insurance plan have a 3 day qualifying hospital stay waiver?  No    Lifestyle & Psychosocial Needs:  Social Determinants of Health     Tobacco Use: High Risk (9/5/2023)    Patient History     Smoking Tobacco Use: Some Days     Smokeless Tobacco Use: Never     Passive Exposure: Not on file   Alcohol Use: Not At Risk (5/6/2019)    AUDIT-C     Frequency of Alcohol Consumption: Monthly or less     Average Number of Drinks: 1 or 2     Frequency of Binge  Drinking: Less than monthly   Financial Resource Strain: Not on file   Food Insecurity: Not on file   Transportation Needs: Not on file   Physical Activity: Not on file   Stress: Not on file   Social Connections: Not on file   Intimate Partner Violence: Not on file   Depression: Not at risk (7/19/2023)    PHQ-2     PHQ-2 Score: 0   Housing Stability: Not on file       Functional Status:  Prior to admission patient needed assistance:   Dependent ADLs:: Transfers, Positioning, Ambulation-walker  Dependent IADLs:: Transportation, Shopping, Laundry, Cleaning, Meal Preparation, Cooking       Mental Health Status: No concerns identified          Chemical Dependency Status: No concerns identified                 Values/Beliefs:  Spiritual, Cultural Beliefs, Jainism Practices, Values that affect care: no               Additional Information:  Maribel Yang is a 72 year old female admitted on 9/5/2023. She has a history of previous hypoxia in the setting of recent COVID-19 infection (08/30/23) treated with 4 day course of Remdesivir and Dexamethasone, pericardial effusion measured at 1,6cm w/o evidence of tamponade, Chronic heart failure with recovered ejection fraction, COPD, and kidney transplant admitted on 09/05/2023 to the ED for weakness and shortness of breath at rest.      CMA was completed via chart review. Most recent CMA was completed on 8/31/23. Patient was discharged home from [facility] on 9/[facility]23. Patient lives in a house with her  Padilla. The patient receives assistance with transferring, walking and with most IADLs. The patient's  and daughter provide additional assistance as needed. The patient's family all share the patient's grandson's car. The patient has a shower chair and 4 wheeled walker that she will use when leaving the house. Patient receives meals from Me[employer] on Wheels.     SW will continue to follow for discharge needs.    KAREN Olivas  [address]   Office:  [phone]  Pager: [phone]  [email or web address]

## 2023-09-06 NOTE — PROGRESS NOTES
I, Michele Lowe, was present with the medical/DAYANA student who participated in the service and in the documentation of the note.  I have verified the history and personally performed the physical exam and medical decision making.  I agree with the assessment and plan of care as documented in the note.      Michele Lowe, PGY2      [facility]    History and Physical - Medicine Service, [facility] ([facility] TEAM 1       Date of Admission:  9/5/2023    Assessment & Plan      Maribel Yang is a 72 year old female admitted on 9/5/2023. She has a history of previous hypoxia in the setting of recent COVID-19 infection (08/30/23) treated with 4 day course of Remdesivir and Dexamethasone, pericardial effusion measured at 1,6cm w/o evidence of tamponade, Chronic heart failure with recovered ejection fraction, COPD, and kidney transplant admitted on 09/05/2023 to the ED for weakness and shortness of breath at rest.     Changes Today  -Discontinued Duonebs nebulizer and moved to Combivent Respimat inhaler given COVID infection   -Restart dexamethasone @6mg orally for 10 days or until clinical improvement of hypoxia  -Upper and lower extremely ultrasound with doppler given elevated d-dimer in the setting of COVID-19 infection  - Ensure Enlive BID daily, and coordinate with social work to check insurance coverage for oral nutrition supplements per nutrition consult  -OT/PT consultation to assess ability to complete ADLs and IADLs      # Confirmed COVID-19 infection    # Acute Hypoxic Respiratory Failure secondary to COVID-19 infection  Pt with confirmed COVID-19 infection (08/30/23) treated with 3 day course of dexamethasone and remdesivir, dyspnea at rest, and non-productive cough on admission. Given the PE findings and lab values differential includes a most likely COVID-19 infection, less likely COPD exacerbation, and a cannot be missed cardiac tamponade. Given the subacute ongoing  presentation of dyspnea, nonproductive cough along with PE findings the most likely etiology is residual COVID-19 symptoms d/t 10 day isolation period not being complete. Additionally, overnight she began to desaturate on room air and was provided supplemental oxygen via nasal canula.  COPD exacerbation is likely given prior diagnosis of COPD, associated dyspnea at rest, and further recurrent desaturation, but is less likely given the afebrile lab tests, absent leukocytosis, and unremarkable CXR at initial presentation on 09/05. Cardiac tamponade is less likely but cannot be missed due to her history of pericardial effusion that recently expanded but is not supported by physical exam and lab findings, POCUS did not show tamponade  - Duonebs nebulizer discontinued and moved to Combivent Respimat inhaler given COVID infection  -Restart dexamethasone given recurrent respiratory decompensation overnight @6mg/daily   -Follow COVID labs (CBC, BMP, D-dimer, CRP) daily  - Heparin for AC  -Follow respiratory bacterial cultures and blood cultures to rule out other infections mechanisms due to immunosuppression and elevated CRP on 09/06  -Consider prednisone 40mg x5d depending on clinical course overnight to treat for CODP exacerbation    #Weakness  #Hypomagnesemia and hypophosphatemia  Patient presented with muscle weakness. Mg 1.1 on admission, muscle weakness rapidly improved after Mg administration. Had been having diarrhea a few days PTA along with poor PO intake. Suspect muscle weakness in the setting of hypomagnesemia that improved with administration of 400 mg Magnesium Oxide  - Replete Mg and Phos PRN due to sever malnutrition in the context of chronic illness per nutrition consult  - Ensure Enlive BID daily, and coordinate with social work to check insurance coverage for oral nutrition supplements per nutrition consult  - OT/PT  - Monitor Magnesium and phosphorus levels daily     # Chronic heart failure with  "preserved ejection fraction: heart failure noted on problem list and last echo with EF >50%     #HTN  #Pericardial Effussion  Patient with history of recent pericardial effusion with minimal expansion 08/30, orthopnea, and Dyspnea at rest, bilateral lower extremity edema, regular rate and rhythm without chest pain. 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 Baseline Cr 1.8-2.0. On admission, Cr elevated to 2.42. BUN 47.7, normal potassium. Likely secondary to prerenal issues with GI symptoms and poor oral intake with COVID. No acute indications for dialysis.   - Transplant Nephrology Consult, appreciate recs  - Tacro level standing  - Monitor BUN and creatinine daily CMP      # Cachexia: Estimated body mass index is 15.68 kg/m  as calculated from the following:    Height as of this encounter: 1.549 m (5' 1\").    Weight as of this encounter: 37.6 kg (83 lb).              Diet: Combination Diet Regular Diet Adult  Snacks/Supplements Adult: Ensure Enlive; With Meals    DVT Prophylaxis: Heparin SQ  Villavicencio Catheter: Not present  Fluids: None  Lines: None     Cardiac Monitoring: None  Code Status: Full Code         The patient's care was discussed with the attending Dr. José Miguel SMITH  Medical Student  Medicine Service, [facility] TEAM [address] [place] [address]  Securely message with [employer] (more info)  Text page via [facility] Paging/Directory   See signed in provider for up to date coverage " information  ______________________________________________________________________    Chief Complaint   Dyspnea and generalized weakness    History is obtained from the patient    History of Present Illness   Maribel Yang is a 72 year old female who has a history of COVID-19 infection on 08/30/23 treated with 3 days of dexamethasone and remdesivir admitted to the ED on 09/05/23 for dyspnea and weakness. Maribel typically does not leave her home, but was watching her grandson earlier during the week of 08/30 prior to the acute onset of initial COVID symptoms. Following admission and treatment for COVID from 08/30 - 09/01 she showed signs of improvement and ultimately was discharged. Soon after she began having associated symptoms of dyspnea at rest and weakness that did not improve prompting her to return to the ED on 09/05 to seek further care.    Interval History  Maribel noted that there was significant improvement in her weakness and has not noticed further deficiencies other than needing supplemental oxygenation via nasal canula. Maribel was restarted on dexamethasone 10 mg injection 09/05 followed by 6 mg oral daily (09/06) due to recurrent respiratory decompensation in the setting of confirmed COVID-19 infection (09/05) and will continue course for 10 days or until clinical improvement.     Past Medical History    Past Medical History:   Diagnosis Date    Abnormal coagulation profile     p [id number]>A heterozygote     Age-related osteoporosis without current pathological fracture 06/22/2019    Anemia     Antiplatelet or antithrombotic long-term use     ASCUS with positive high risk HPV 2007, 2015    + HPV 56, 54,& 6, colp - TAL III, Leep =TAL II    Basal cell carcinoma     Congestive heart failure, unspecified HF chronicity, unspecified heart failure type (H) 06/22/2021    COPD (chronic obstructive pulmonary disease) (H)     Depressive disorder 07/2015    Heart attack (H)     History of blood transfusion      Hypertension     Immunosuppressed status (H)     due meds    Kidney replaced by transplant 09/2004    Living donor recipient,  Rejection 7/2005    LSIL (low grade squamous intraepithelial lesion) on Pap smear 04/2013    +HPV 33 or 45, 61      PAD (peripheral artery disease) (H)     PONV (postoperative nausea and vomiting)     Squamous cell lung cancer (H)     Thrombosis of leg 1967    Unspecified disorder of kidney and ureter     X-linked dominant Alport's syndrome.       Past Surgical History   Past Surgical History:   Procedure Laterality Date    BIOPSY      Kidney, Lung, Breast    BIOPSY ANAL N/A 03/14/2018    Procedure: BIOPSY ANAL;;  Surgeon: Shabbir Leo MD;  [facility]    BYPASS GRAFT FEMORAL FEMORAL Bilateral 04/25/2021    Procedure: Axplantation infected graft, femoral to femoral bypass with cadaveric artery, bilateral SATORIUS MUSCLE FLAP CREATION, evacuation of absece, vacuum closure;  Surgeon: Raven Lewis MD;  [facility]    COLONOSCOPY      COLONOSCOPY N/A 08/09/2017    Procedure: COMBINED COLONOSCOPY, SINGLE OR MULTIPLE BIOPSY/POLYPECTOMY BY BIOPSY;;  Surgeon: Sushil Hyatt MD;  [facility] GI    COLONOSCOPY N/A 7/28/2022    Procedure: COLONOSCOPY, WITH BIOPSY;  Surgeon: Moise Corcoran MD;  [facility]    COLPOSCOPY,LOOP ELECTRD CERVIX EXCIS  03/11/2008    TAL II    CONIZATION LEEP  07/17/2013    Procedure: CONIZATION LEEP;;  Surgeon: Liliana Renteria MD;  [facility]    CONIZATION LEEP N/A 08/17/2016    Procedure: CONIZATION LEEP;  Surgeon: Liliana Renteria MD;  [facility]    CV CORONARY ANGIOGRAM N/A 04/06/2021    Procedure: CV CORONARY ANGIOGRAM;  Surgeon: Sincere Rosas MD;  Location:  [facility]    CV CORONARY ANGIOGRAM N/A 04/25/2021    Procedure: Coronary Angiogram;  Surgeon: Sincere Rosas MD;  Location:  [facility]    CV PCI STENT DRUG ELUTING N/A 04/06/2021    Procedure:  Percutaneous Coronary Intervention Stent Drug Eluting;  Surgeon: Sincere Rosas MD;  Location:  [facility] CATH LAB    ENDOVASCULAR REPAIR ANEURYSM AORTOILIAC Bilateral 04/06/2021    Procedure: Endovascular Abdominal Aortic Aneurysm Repair with Aortouniiliac Device and Femoral-Femoral Artery Bypass with [id number] Artegraft;  Surgeon: Abena Ferrara MD;  [facility]    ESOPHAGOSCOPY, GASTROSCOPY, DUODENOSCOPY (EGD), COMBINED N/A 2/2/2023    Procedure: ESOPHAGOGASTRODUODENOSCOPY, WITH BIOPSY;  Surgeon: Yazan Heredia MD;  Location:  GI    EXAM UNDER ANESTHESIA ANUS  07/15/2014    Procedure: EXAM UNDER ANESTHESIA ANUS;  Surgeon: Radha Musa MD;  [facility]    EXAM UNDER ANESTHESIA ANUS N/A 03/14/2018    Procedure: EXAM UNDER ANESTHESIA ANUS;  Anal Exam Under Anesthesia With Excision of anal lesion, proctoscopy;  Surgeon: Shabbir Leo MD;  [facility]    EYE SURGERY      GENITOURINARY SURGERY      IR OR ANGIOGRAM  04/06/2021    IRRIGATION AND DEBRIDEMENT GROIN Right 04/24/2021    Procedure: IRRIGATION AND DEBRIDEMENT, INGUINAL REGION, I & D PF RIGHT GROIN;  Surgeon: Raven Lewis MD;  [facility]    IRRIGATION AND DEBRIDEMENT GROIN N/A 04/26/2021    Procedure: IRRIGATION AND DEBRIDEMENT, INGUINAL REGION, PLACEMENT OF VERAFLO WOUND VAC, WOUND WASHOUT,;  Surgeon: Raven Lewis MD;  [facility]    LASER CO2 EXCISE VULVA WIDE LOCAL  07/15/2014    Procedure: LASER CO2 EXCISE VULVA WIDE LOCAL;  Surgeon: Liliana Renteria MD;  [facility]    LASER CO2 VAGINA  07/17/2013    Procedure: LASER CO2 VAGINA;;  Surgeon: Liliana Renteria MD;  [facility]    LASER CO2 VAGINA N/A 09/25/2018    Procedure: LASER CO2 VAGINA;  Exam Under Anesthesia, CO2 Laser Ablation of Upper Vagina and Cervix;  Surgeon: Pati Garcia MD;  [facility]    MICROSCOPY ANAL  07/17/2013    Procedure: MICROSCOPY ANAL;  Anal Microscopy,  EUA vagina,Colposcopy Of Vagina And  Vulva, Vaginal Biopsies, Omniguide Co2 Laser To Vagina and vulva, Loop Electrosurgical Excision Procedure To Cervix;  Surgeon: Radha Musa MD;  [facility]    MICROSCOPY ANAL  07/15/2014    Procedure: MICROSCOPY ANAL;  Surgeon: Radha Musa MD;  [facility]    PICC DOUBLE LUMEN PLACEMENT Right 04/30/2021    39cm, Basilic vein    PSEUDOANEURYSM REPAIR Right 6/27/2023    Procedure: RIGHT FEMORAL TO PROFUNDA FEMORIS ARTERY BYPASS WITH cadaveric femoral-popliteal artery, REPAIR OF RIGHT GROIN PSEUDOANEURYSM;  Surgeon: Abena Ferrara MD;  [facility]    TRANSESOPHAGEAL ECHOCARDIOGRAM INTRAOPERATIVE N/A 04/28/2021    Procedure: ECHOCARDIOGRAM, TRANSESOPHAGEAL, INTRAOPERATIVE;  Surgeon: GENERIC ANESTHESIA PROVIDER;  [facility]    VASCULAR SURGERY      Thrombectomy    [facility] NONSPECIFIC PROCEDURE      Thrombectomy    [facility] NONSPECIFIC PROCEDURE  1955 and 1959    Bilater eye surgery - correction for crossed eyes    [facility] NONSPECIFIC PROCEDURE  1998    oopherectomy L    [facility] NONSPECIFIC PROCEDURE  1967    open kidney biopsy - L    ZZC TRANSPLANTATION OF KIDNEY  09/2004    recip[facility]        Prior to Admission Medications   Prior to Admission Medications   Prescriptions Last Dose Informant Patient Reported? Taking?   ASPIRIN LOW DOSE 81 MG chewable tablet  Self No No   Sig: CHEW AND SWALLOW 1 TABLET (81 MG) BY MOUTH DAILY   Lactobacillus-Inulin ([facility] DIGESTIVE HEALTH) CAPS  Self No No   Sig: TAKE ONE CAPSULE BY MOUTH ONCE DAILY (DUE FOR PHYSICAL IN MARCH)   Nutritional Supplements (ENSURE CLEAR) LIQD  Self No No   Sig: Take 1 Bottle by mouth daily   acetaminophen (TYLENOL) 325 MG tablet  Self No No   Sig: Take 2 tablets (650 mg) by mouth every 4 hours as needed for other (For optimal non-opioid multimodal pain management to improve pain control.)   albuterol (PROAIR HFA/PROVENTIL HFA/VENTOLIN HFA) 108 (90 Base) MCG/ACT inhaler  Self No No   Sig: Inhale 1-2 puffs into the  lungs every 6 hours as needed for shortness of breath, wheezing or cough   atorvastatin (LIPITOR) 80 MG tablet  Self No No   Sig: Take 1 tablet (80 mg) by mouth daily   calcium carbonate-vitamin D (CALTRATE) 600-10 MG-MCG per tablet  Self No No   Sig: TAKE ONE TABLET BY MOUTH TWICE A DAY   clopidogrel (PLAVIX) 75 MG tablet  Self No No   Sig: Take 1 tablet (75 mg) by mouth daily   esomeprazole (NEXIUM) 20 MG DR capsule  Self No No   Sig: Take 1 capsule (20 mg) by mouth every morning (before breakfast) Take 30-60 minutes before eating.   fluticasone (FLONASE) 50 MCG/ACT nasal spray  Self No No   Sig: Spray 1 spray into both nostrils daily   hydrALAZINE (APRESOLINE) 10 MG tablet   No No   Sig: Take 2.5 tablets (25 mg) by mouth 3 times daily for 3 days, THEN 1 tablet (10 mg) 3 times daily for 10 days.   isosorbide mononitrate (IMDUR) 60 MG 24 hr tablet  Self No No   Sig: Take 1 tablet (60 mg) by mouth daily   lisinopril (ZESTRIL) 2.5 MG tablet   No No   Sig: Take 1 tablet (2.5 mg) by mouth daily   magnesium oxide (MAG-OX) 400 MG tablet   No No   Sig: Take 1 tablet (400 mg) by mouth daily for 83 days   metoprolol tartrate (LOPRESSOR) 100 MG tablet  Self No No   Sig: Take 1 tablet (100 mg) by mouth daily   oxyCODONE (ROXICODONE) 5 MG tablet  Self No No   Sig: Take 1 tablet (5 mg) by mouth every 4 hours as needed for severe pain   predniSONE (DELTASONE) 5 MG tablet  Self No No   Sig: Take 1 tablet (5 mg) by mouth daily   psyllium (METAMUCIL/KONSYL) 58.6 % powder  Self Yes No   Sig: Take by mouth every morning   tacrolimus (GENERIC EQUIVALENT) 0.5 MG capsule  Self No No   Sig: Take 4 capsules (2 mg) by mouth 2 times daily   torsemide (DEMADEX) 10 MG tablet   No No   Sig: Take 1 tablet (10 mg) by mouth daily Take an additional 10 MG every other day as needed. Additional use as directed by [facility] clinic.      Facility-Administered Medications: None             Physical Exam   GENERAL APPEARANCE: pleasant, cachectic appearing,  resting comfortably in bed, no acute distress  HEENT: Normocephalic and atraumatic.   LUNGS: Clear to auscultation bilaterally without wheezes, rhonchi, or rales.  HEART: RRR with normal S1 and S2. Normal heart sounds. No murmurs, gallops, or rubs.  ABDOMEN: Soft, nontender, nondistended.   EXTREMITIES: bilateral lower extremity edema from below the ankle, nontender without surrounding erythema. Peripheral pedal pulses palpated.   SKIN: No rashes on visible skin  NEUROLOGIC: Oriented x3 with limited range of motion on exam  Vital Signs: Temp: 98.2  F (36.8  C) Temp src: Oral BP: (!) 157/103 Pulse: 76   Resp: 18 SpO2: 93 % O2 Device: None (Room air) Oxygen Delivery: 2 LPM  Weight: 82 lbs 6.4 oz            Data     I have personally reviewed the following data over the past 24 hrs:    5.8  \   10.8 (L)   / 171     137 102 47.9 (H) /  115 (H)   4.6 24 1.89 (H) \     Procal: N/A CRP: 92.50 (H) Lactic Acid: N/A       INR:  N/A PTT:  N/A   D-dimer:  13.38 (H) Fibrinogen:  N/A

## 2023-09-06 NOTE — PLAN OF CARE
Goal Outcome Evaluation:      Plan of Care Reviewed With: patient    Overall Patient Progress: no changeOverall Patient Progress: no change    Outcome Evaluation: see RD note 9/6    Meggan Meneses RDN, LD    6C (beds 8180-7324)/7C (beds 5238-2847)/ED   RD pager: [phone]  Weekend/Holiday RD pager: [phone]

## 2023-09-06 NOTE — PLAN OF CARE
"Assumed cares 7212-0609     BP (!) 157/103   Pulse 76   Temp 98.2  F (36.8  C) (Oral)   Resp 18   Ht 1.549 m (5' 1\")   Wt 37.4 kg (82 lb 6.4 oz)   SpO2 93%   BMI 15.57 kg/m       Pain: Patient denied pain.   Neuro: A&Ox4.    Respiratory: lungs clear and equal, diminished in lower lobes. On RA. Dyspnea on exertion. SOB with activity.  Cardiac/Neurovascular: HR and pulse WDL. No numbness or tingling reported.   GI/: Adequate urine output. BM yesterday per patient.   Nutrition: Regular diet.   Activity: Up SBA with walker.   Skin: No concerns.   Lines: PIV L hand (SL).  Events this shift: patient admitted from the ED around 1000. Skin check completed with Christian VARELA, no concerns noted. Patient needs sputum sample sent down if she is able to produce any. Upper and lower extremity ultrasound completed this afternoon. PT/OT consults today.      Plan: Continue with plan of care.     Goal Outcome Evaluation:      Plan of Care Reviewed With: patient    Overall Patient Progress: no changeOverall Patient Progress: no change    Outcome Evaluation: Admitted from ED around 10AM      "

## 2023-09-06 NOTE — PROGRESS NOTES
CLINICAL NUTRITION SERVICES - ASSESSMENT NOTE     Nutrition Prescription    RECOMMENDATIONS FOR MDs/PROVIDERS TO ORDER:  None currently    Malnutrition Status:    Severe malnutrition in the context of chronic illness/disease    Recommendations already ordered by Registered Dietitian (RD):  Ensure Enlive BID, strawberry  Will work with DME, care coordinator/ on seeing if patient's insurance will cover or help cover oral nutrition supplements. Patient would benefit from at least 2 oral nutrition supplements daily to help with weight gain.     Future/Additional Recommendations:  Monitor PO, supplement use, weight     REASON FOR ASSESSMENT  Maribel Yang is a/an 72 year old female assessed by the dietitian for Provider Order - appears very malnourished and cachectic    Patient admitted for  weakness and shortness of breath at rest.      MEDICAL HISTORY  previous hypoxia in the setting of recent COVID-19 infection (08/30/23) treated with 4 day course of Remdesivir and Dexamethasone, pericardial effusion w/o evidence of tamponade, Chronic heart failure with recovered ejection fraction, COPD, and kidney transplant (2004)     NUTRITION HISTORY  Pt last seen by inpatient dietitian 8/31/23. Pt was receiving Ensure Plus TID.  Pt reports using Ensure at home and was told goal of BID but isn't able to afford more than once daily. She reports not being a big eater in general. She typically does a late breakfast, early dinner, and HS snack that includes protein (such as toast and eggs). She reports PO hasn't changed much recently even w/ feeling ill.     CURRENT NUTRITION ORDERS  Diet: Regular    Intake/Tolerance: No documented intakes to assess.    LABS   09/05/23 10:17 09/05/23 18:50 09/06/23 07:27   Sodium 138  137   Potassium 3.8  4.6   Chloride 103  102   Carbon Dioxide (CO2) 23  24   Urea Nitrogen 48.3 (H)  47.9 (H)   Creatinine 1.74 (H)  1.89 (H)   GFR Estimate 31 (L)  28 (L)   Calcium 8.8  9.3   Anion Gap  "12  11   Magnesium 1.1 (L) 1.9 1.9   Phosphorus 1.8 (L)  3.6   Albumin 3.3 (L)     Protein Total 6.1 (L)     Alkaline Phosphatase 108 (H)     ALT <5     AST 18     Bilirubin Total 0.3     CRP Inflammation   92.50 (H)   Glucose 119 (H)  115 (H)   Troponin T, High Sensitivity 39 (H)         MEDICATIONS  Lipitor  Calcium carbonate vitamin D BID  Decadron  Mag-ox  Protonix  Tacrolimus   Senna PRN    ANTHROPOMETRICS  Height: 154.9 cm (5' 1\")  Most Recent Weight: 37.6 kg (83 lb)    IBW: 47.7 kg   Body mass index is 15.68 kg/m . BMI Category: Underweight BMI <18.5  Weight History:  2.8 kg (6.9%) weight loss over 5 months.  Wt Readings from Last 15 Encounters:   09/05/23 37.6 kg (83 lb)   08/30/23 37.6 kg (83 lb)   07/19/23 37.2 kg (82 lb)   06/28/23 38.6 kg (85 lb 1.6 oz)   06/21/23 39.9 kg (88 lb)   06/14/23 39.1 kg (86 lb 3.2 oz)   04/06/23 40.4 kg (89 lb)   03/15/23 40.3 kg (88 lb 14.4 oz)   02/02/23 39.9 kg (88 lb)   01/25/23 42 kg (92 lb 8 oz)   01/25/23 40.7 kg (89 lb 12.8 oz)   11/28/22 41.2 kg (90 lb 14.4 oz)   08/04/22 41.4 kg (91 lb 3.2 oz)   07/28/22 40.6 kg (89 lb 8.1 oz)   07/27/22 39.9 kg (87 lb 14.4 oz)         ASSESSED NUTRITION NEEDS  Dosing Weight: 47.7 kg (IBW)   Estimated Energy Needs: 7957-0912 kcals/day (25 - 30 kcals/kg)  Justification: Underweight  Estimated Protein Needs: 48-57 grams protein/day (1 - 1.2 grams of pro/kg)  Justification: Underweight, kidney txp hx  Estimated Fluid Needs: 9154-8909 mL/day (1 mL/kcal)   Justification: Maintenance    PHYSICAL FINDINGS  See malnutrition section below.         MALNUTRITION  % Intake: < 75% for >/= 1 month (moderate)  % Weight Loss: Weight loss does not meet criteria for malnutrition   Subcutaneous Fat Loss: Global/full body: severe   Muscle Loss: Global/full body: severe  Fluid Accumulation/Edema: None noted  Malnutrition Diagnosis: Severe malnutrition in the context of chronic illness/disease    NUTRITION DIAGNOSIS  Malnutrition related to chronic heart " failure, COPD, inadequate oral intake as evidenced by patient history, fat and muscle loss.       INTERVENTIONS  Implementation  Nutrition Education: will be provided if nutrition education needs arise.    Collaboration with other nutrition professionals  Collaboration with other providers  Medical food supplement therapy     Goals  Patient to consume % of nutritionally adequate meal trays TID, or the equivalent with supplements/snacks.        Monitoring/Evaluation  Progress toward goals will be monitored and evaluated per protocol.    Meggan Meneses RDN, LD    6C (beds 8885-3834)/7C (beds 3623-9644)/ED   RD pager: [phone]  Weekend/Holiday RD pager: [phone]

## 2023-09-07 NOTE — PLAN OF CARE
Occupational Therapy: Orders received. Chart reviewed and discussed with patient and care team. Occupational therapy not indicated at this time. Pt using the bathroom IND upon OT arrival, moving near baseline with PT. No IP OT needs identified at this time.? Defer discharge recommendations to physical therapy.? Will complete orders.    Please feel free to re-consult if pt has change in status or new IP OT needs become apparent.

## 2023-09-07 NOTE — PROVIDER NOTIFICATION
Provider notified (name): Alex Rincon   Reason for notification: Pt had hypertensive episode  Recommendation/request given to provider:  Pt BP was 175/116, recheck was 179/105. There are no prn meds to help, and she has been hypertensive throughout today. No other side effect. What do you want us to do?    Response from provider:   PRN hydralazine ordered q8hrs. BP goal of < 140/90 with medication.

## 2023-09-07 NOTE — PLAN OF CARE
"BP (!) 169/113 (BP Location: Right arm)   Pulse 80   Temp 98.3  F (36.8  C) (Oral)   Resp 19   Ht 1.549 m (5' 1\")   Wt 37.2 kg (82 lb)   SpO2 96%   BMI 15.49 kg/m       Care from: 0700 - 1930    VS & Pain: VSS ex hypertensive. Now receiving scheduled Q8h hydralazine 50mg. Denied pain this shift.  Neuro: A&Ox4.  Respiratory: Vitally stable on RA satting mid 90s at rest.  GI/: BM x1 this shift. Voiding WDL.  Nutrition: Regular diet. Adequate appetite and intake.  Skin: No new skin concerns this shift.  Lines: L piv SL.  Activity: Assist of 1 with gait belt and walker.  Events this shift: Call light within reach and able to make needs known. Awaiting sputum culture but patient unable to produce a specimen.    Plan: Continue to follow plan of care - anticipate discharge home when medically ready.    Goal Outcome Evaluation:      Plan of Care Reviewed With: patient    Overall Patient Progress: no changeOverall Patient Progress: no change    Outcome Evaluation: Continues to be vitally stable on RA.      "

## 2023-09-07 NOTE — PROVIDER NOTIFICATION
Provider notified (name):  Robbie Comer   Reason for notification: 7C: rm#[id number]. YC. FYI: BP was 177/113. Gave scheduled hydralazine and will recheck. Thanks! Katherine VARELA

## 2023-09-07 NOTE — PROGRESS NOTES
I, Michele Lowe, was present with the medical/DAYANA student who participated in the service and in the documentation of the note.  I have verified the history and personally performed the physical exam and medical decision making.  I agree with the assessment and plan of care as documented in the note.      Michele Lowe, PGY2      [facility]    History and Physical - Medicine Service, [facility] TEAM 1       Date of Admission:  9/5/2023    Assessment & Plan      Maribel Yang is a 72 year old female admitted on 9/5/2023. She has a history of previous hypoxia in the setting of recent COVID-19 infection (08/30/23) treated with 4 day course of Remdesivir and Dexamethasone, pericardial effusion measured at 1,6cm w/o evidence of tamponade, Chronic heart failure with recovered ejection fraction, COPD, and kidney transplant admitted on 09/05/2023 to the ED for weakness and shortness of breath at rest.     Changes Today  -Continue dexamethasone @6mg orally for 10 days or until clinical improvement of hypoxia  -Continue Blood pressure management with metoprolol, hydralazine, and lisinopril - consider discontinuing if creatinine continues uptrend  - Increased hydralazine from 25mg to 50mg q8h  - Potential discharge sylvia      # Confirmed COVID-19 infection    # Acute Hypoxic Respiratory Failure secondary to COVID-19 infection  Pt with confirmed COVID-19 infection (08/30/23) treated with 3 day course of dexamethasone and remdesivir, dyspnea at rest, and non-productive cough on admission. Given the PE findings and lab values differential includes a most likely COVID-19 infection, less likely COPD exacerbation, and a cannot be missed cardiac tamponade. Given the subacute ongoing presentation of dyspnea, nonproductive cough along with PE findings the most likely etiology is residual COVID-19 symptoms d/t 10 day isolation period not being complete. Additionally, overnight she began to  desaturate on room air and was provided supplemental oxygen via nasal canula.  COPD exacerbation is likely given prior diagnosis of COPD, associated dyspnea at rest, and further recurrent desaturation, but is less likely given the afebrile lab tests, absent leukocytosis, and unremarkable CXR at initial presentation on 09/05. Cardiac tamponade is less likely but cannot be missed due to her history of pericardial effusion that recently expanded but is not supported by physical exam and lab findings, POCUS did not show tamponade  - Continue Combivent Respimat inhaler given COVID infection  -Continue dexamethasone given previous recurrent respiratory decompensation overnight @6mg/daily. Now improving and on Room air  -Follow COVID labs (CBC, BMP, D-dimer, CRP) daily  - Heparin for AC  - F/up on cultures  - All extremities US given rise in D-dimer (likely in s/o COVID) w/o DVTs. Did show groin hematoma (decreased in size from prior imaging) and seemingly new possible hematoma vs infection vs seroma in R groin, largest dimension 1.9cm. Per patient this has been there for some time, unchanged in size, not bothersome to her      #Weakness  #Hypomagnesemia and hypophosphatemia  Patient presented with muscle weakness. Mg 1.1 on admission, muscle weakness rapidly improved after Mg administration. Had been having diarrhea a few days PTA along with poor PO intake. Suspect muscle weakness in the setting of hypomagnesemia that improved with administration of 400 mg Magnesium Oxide  - Replete Mg and Phos PRN due to severe malnutrition in the context of chronic illness per nutrition consult  - Ensure Enlive BID daily, and coordinate with social work to check insurance coverage for oral nutrition supplements per nutrition consult  - OT/PT  - Monitor Magnesium and phosphorus levels daily     # Chronic heart failure with preserved ejection fraction: heart failure noted on problem list and last echo with EF >50%     #HTN  #Pericardial  "Effussion  Patient with history of recent pericardial effusion with minimal expansion 08/30, orthopnea, and Dyspnea at rest, bilateral lower extremity edema, regular rate and rhythm without chest pain. 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 Baseline Cr 1.8-2.0. On admission, Cr elevated to 2.42. BUN 47.7, normal potassium. Likely secondary to prerenal issues with GI symptoms and poor oral intake with COVID. No acute indications for dialysis.   - Transplant Nephrology Consult, appreciate recs  - Tacrolimus goal 4-6, continue immediate release tacrolimus and restart Prednisone @ 5 mg daily after dexamethasone course finished per nephrology recommendations   - Monitor BUN and creatinine daily CMP      # Cachexia: Estimated body mass index is 15.68 kg/m  as calculated from the following:    Height as of this encounter: 1.549 m (5' 1\").    Weight as of this encounter: 37.6 kg (83 lb).              Diet: Combination Diet Regular Diet Adult  Snacks/Supplements Adult: Ensure Enlive; With Meals    DVT Prophylaxis: Heparin SQ  Villavicencio Catheter: Not present  Fluids: None  Lines: None     Cardiac Monitoring: None  Code Status: Full Code         The patient's care was discussed with the attending Dr. José Miguel SMITH  Medical Student  Medicine Service, [facility] TEAM 1  [facility]  Securely message with [employer] (more info)  Text page via [facility] Paging/Directory   See signed in provider for up to date coverage information  ______________________________________________________________________    Chief Complaint   Dyspnea and generalized weakness    History is obtained from the patient    History of Present Illness   Maribel Yang is a 72 year old female who has a history of COVID-19 infection on 08/30/23 treated with 3 days of dexamethasone and remdesivir admitted to the ED on 09/05/23 for dyspnea and weakness. Maribel typically does not leave her home, but was watching her grandson earlier during the week of 08/30 prior to the acute onset of initial COVID symptoms. Following admission and treatment for COVID from 08/30 - 09/01 she showed signs of improvement and ultimately was discharged. Soon after she began having associated symptoms of dyspnea at rest and weakness that did not improve prompting her to return to the ED on 09/05 to seek further care.    Interval History  Maribel noted that there was significant improvement in her weakness and has not noticed further deficiencies. No acute events overnight, however pt noticed her blood pressure was high and was concerned, consulted with transplant nephrology for management.  Was not requiring supplemental oxygen when seen in the morning. Day 2/10 of dexamethasone 10 mg injection (09/05) followed by 6 mg daily (09/07) due to recurrent respiratory decompensation in the setting of confirmed COVID-19 infection (09/05) and will continue course for 10 days or until clinical improvement.     Past Medical History    Past Medical History:   Diagnosis Date    Abnormal coagulation profile     p [id number] heterozygote     Age-related osteoporosis without current pathological fracture 06/22/2019    Anemia     Antiplatelet or antithrombotic long-term use     ASCUS with positive high risk HPV 2007, 2015    + HPV 56, 54,& 6, colp - TAL III, Leep =TAL II     Basal cell carcinoma     Congestive heart failure, unspecified HF chronicity, unspecified heart failure type (H) 06/22/2021    COPD (chronic obstructive pulmonary disease) (H)     Depressive disorder 07/2015    Heart attack (H)     History of blood transfusion     Hypertension     Immunosuppressed status (H)     due meds    Kidney replaced by transplant 09/2004    Living donor recipient,  Rejection 7/2005    LSIL (low grade squamous intraepithelial lesion) on Pap smear 04/2013    +HPV 33 or 45, 61      PAD (peripheral artery disease) (H)     PONV (postoperative nausea and vomiting)     Squamous cell lung cancer (H)     Thrombosis of leg 1967    Unspecified disorder of kidney and ureter     X-linked dominant Alport's syndrome.       Past Surgical History   Past Surgical History:   Procedure Laterality Date    BIOPSY      Kidney, Lung, Breast    BIOPSY ANAL N/A 03/14/2018    Procedure: BIOPSY ANAL;;  Surgeon: Shabbir Leo MD;  Location: [facility]    BYPASS GRAFT FEMORAL FEMORAL Bilateral 04/25/2021    Procedure: Axplantation infected graft, femoral to femoral bypass with cadaveric artery, bilateral SATORIUS MUSCLE FLAP CREATION, evacuation of absece, vacuum closure;  Surgeon: Raven Lewis MD;  [facility]    COLONOSCOPY      COLONOSCOPY N/A 08/09/2017    Procedure: COMBINED COLONOSCOPY, SINGLE OR MULTIPLE BIOPSY/POLYPECTOMY BY BIOPSY;;  Surgeon: Sushil Hyatt MD;  Location: [facility] GI    COLONOSCOPY N/A 7/28/2022    Procedure: COLONOSCOPY, WITH BIOPSY;  Surgeon: Moise Corcoran MD;  Location:  GI    COLPOSCOPY,LOOP ELECTRD CERVIX EXCIS  03/11/2008    TAL II    CONIZATION LEEP  07/17/2013    Procedure: CONIZATION LEEP;;  Surgeon: Liliana Renteria MD;  [facility]    CONIZATION LEEP N/A 08/17/2016    Procedure: CONIZATION LEEP;  Surgeon: Liliana Renteria MD;  [facility]    CV CORONARY ANGIOGRAM N/A 04/06/2021    Procedure: CV CORONARY ANGIOGRAM;  Surgeon: Sincere Rosas  MD Linda;  Location: [facility] CARDIAC CATH LAB    CV CORONARY ANGIOGRAM N/A 04/25/2021    Procedure: Coronary Angiogram;  Surgeon: Sincere Rosas MD;  Location: [facility] CARDIAC CATH LAB    CV PCI STENT DRUG ELUTING N/A 04/06/2021    Procedure: Percutaneous Coronary Intervention Stent Drug Eluting;  Surgeon: Sincere Rosas MD;  Location: [facility] CARDIAC CATH LAB    ENDOVASCULAR REPAIR ANEURYSM AORTOILIAC Bilateral 04/06/2021    Procedure: Endovascular Abdominal Aortic Aneurysm Repair with Aortouniiliac Device and Femoral-Femoral Artery Bypass with [id number] Artegraft;  Surgeon: Abena Ferrara MD;  [facility]    ESOPHAGOSCOPY, GASTROSCOPY, DUODENOSCOPY (EGD), COMBINED N/A 2/2/2023    Procedure: ESOPHAGOGASTRODUODENOSCOPY, WITH BIOPSY;  Surgeon: Yazan Heredia MD;  Location:  GI    EXAM UNDER ANESTHESIA ANUS  07/15/2014    Procedure: EXAM UNDER ANESTHESIA ANUS;  Surgeon: Radha Musa MD;  [facility]    EXAM UNDER ANESTHESIA ANUS N/A 03/14/2018    Procedure: EXAM UNDER ANESTHESIA ANUS;  Anal Exam Under Anesthesia With Excision of anal lesion, proctoscopy;  Surgeon: Shabbir Leo MD;  Location: [facility]    EYE SURGERY      GENITOURINARY SURGERY      IR OR ANGIOGRAM  04/06/2021    IRRIGATION AND DEBRIDEMENT GROIN Right 04/24/2021    Procedure: IRRIGATION AND DEBRIDEMENT, INGUINAL REGION, I & D PF RIGHT GROIN;  Surgeon: Raven Lewis MD;  [facility]    IRRIGATION AND DEBRIDEMENT GROIN N/A 04/26/2021    Procedure: IRRIGATION AND DEBRIDEMENT, INGUINAL REGION, PLACEMENT OF VERAFLO WOUND VAC, WOUND WASHOUT,;  Surgeon: Raven Lewis MD;  [facility]    LASER CO2 EXCISE VULVA WIDE LOCAL  07/15/2014    Procedure: LASER CO2 EXCISE VULVA WIDE LOCAL;  Surgeon: Liliana Renteria MD;  [facility]    LASER CO2 VAGINA  07/17/2013    Procedure: LASER CO2 VAGINA;;  Surgeon: Liliana Renteria MD;  [facility]    LASER CO2 VAGINA N/A  09/25/2018    Procedure: LASER CO2 VAGINA;  Exam Under Anesthesia, CO2 Laser Ablation of Upper Vagina and Cervix;  Surgeon: Pati Garcia MD;  [facility]    MICROSCOPY ANAL  07/17/2013    Procedure: MICROSCOPY ANAL;  Anal Microscopy,  EUA vagina,Colposcopy Of Vagina And Vulva, Vaginal Biopsies, Omniguide Co2 Laser To Vagina and vulva, Loop Electrosurgical Excision Procedure To Cervix;  Surgeon: Radha Musa MD;  [facility]    MICROSCOPY ANAL  07/15/2014    Procedure: MICROSCOPY ANAL;  Surgeon: Radha Musa MD;  [facility]    PICC DOUBLE LUMEN PLACEMENT Right 04/30/2021    39cm, Basilic vein    PSEUDOANEURYSM REPAIR Right 6/27/2023    Procedure: RIGHT FEMORAL TO PROFUNDA FEMORIS ARTERY BYPASS WITH cadaveric femoral-popliteal artery, REPAIR OF RIGHT GROIN PSEUDOANEURYSM;  Surgeon: Abena Ferrara MD;  [facility]    TRANSESOPHAGEAL ECHOCARDIOGRAM INTRAOPERATIVE N/A 04/28/2021    Procedure: ECHOCARDIOGRAM, TRANSESOPHAGEAL, INTRAOPERATIVE;  Surgeon: GENERIC ANESTHESIA PROVIDER;  Location: [facility] OR    VASCULAR SURGERY      Thrombectomy    [facility] NONSPECIFIC PROCEDURE      Thrombectomy    [facility] NONSPECIFIC PROCEDURE  1955 and 1959    Bilater eye surgery - correction for crossed eyes    [facility] NONSPECIFIC PROCEDURE  1998    oopherectomy L    [facility] NONSPECIFIC PROCEDURE  1967    open kidney biopsy - L    [facility] TRANSPLANTATION OF KIDNEY  09/2004    recipient -- done at [facility]       Prior to Admission Medications   Prior to Admission Medications   Prescriptions Last Dose Informant Patient Reported? Taking?   ASPIRIN LOW DOSE 81 MG chewable tablet  Self No No   Sig: CHEW AND SWALLOW 1 TABLET (81 MG) BY MOUTH DAILY   Lactobacillus-Inulin ([facility] DIGESTIVE HEALTH) CAPS  Self No No   Sig: TAKE ONE CAPSULE BY MOUTH ONCE DAILY (DUE FOR PHYSICAL IN MARCH)   Nutritional Supplements (ENSURE CLEAR) LIQD  Self No No   Sig: Take 1 Bottle by mouth daily   acetaminophen (TYLENOL) 325 MG tablet  Self No No    Sig: Take 2 tablets (650 mg) by mouth every 4 hours as needed for other (For optimal non-opioid multimodal pain management to improve pain control.)   albuterol (PROAIR HFA/PROVENTIL HFA/VENTOLIN HFA) 108 (90 Base) MCG/ACT inhaler  Self No No   Sig: Inhale 1-2 puffs into the lungs every 6 hours as needed for shortness of breath, wheezing or cough   atorvastatin (LIPITOR) 80 MG tablet  Self No No   Sig: Take 1 tablet (80 mg) by mouth daily   calcium carbonate-vitamin D (CALTRATE) 600-10 MG-MCG per tablet  Self No No   Sig: TAKE ONE TABLET BY MOUTH TWICE A DAY   clopidogrel (PLAVIX) 75 MG tablet  Self No No   Sig: Take 1 tablet (75 mg) by mouth daily   esomeprazole (NEXIUM) 20 MG DR capsule  Self No No   Sig: Take 1 capsule (20 mg) by mouth every morning (before breakfast) Take 30-60 minutes before eating.   fluticasone (FLONASE) 50 MCG/ACT nasal spray  Self No No   Sig: Spray 1 spray into both nostrils daily   hydrALAZINE (APRESOLINE) 10 MG tablet   No No   Sig: Take 2.5 tablets (25 mg) by mouth 3 times daily for 3 days, THEN 1 tablet (10 mg) 3 times daily for 10 days.   isosorbide mononitrate (IMDUR) 60 MG 24 hr tablet  Self No No   Sig: Take 1 tablet (60 mg) by mouth daily   lisinopril (ZESTRIL) 2.5 MG tablet   No No   Sig: Take 1 tablet (2.5 mg) by mouth daily   magnesium oxide (MAG-OX) 400 MG tablet   No No   Sig: Take 1 tablet (400 mg) by mouth daily for 83 days   metoprolol tartrate (LOPRESSOR) 100 MG tablet  Self No No   Sig: Take 1 tablet (100 mg) by mouth daily   oxyCODONE (ROXICODONE) 5 MG tablet  Self No No   Sig: Take 1 tablet (5 mg) by mouth every 4 hours as needed for severe pain   predniSONE (DELTASONE) 5 MG tablet  Self No No   Sig: Take 1 tablet (5 mg) by mouth daily   psyllium (METAMUCIL/KONSYL) 58.6 % powder  Self Yes No   Sig: Take by mouth every morning   tacrolimus (GENERIC EQUIVALENT) 0.5 MG capsule  Self No No   Sig: Take 4 capsules (2 mg) by mouth 2 times daily   torsemide (DEMADEX) 10 MG  tablet   No No   Sig: Take 1 tablet (10 mg) by mouth daily Take an additional 10 MG every other day as needed. Additional use as directed by [facility] clinic.      Facility-Administered Medications: None             Physical Exam   GENERAL APPEARANCE: pleasant, cachectic appearing, resting comfortably in bed, no acute distress  HEENT: Normocephalic and atraumatic.   LUNGS: Clear to auscultation but decreased bilaterally without wheezes, rhonchi, or rales.  HEART: RRR with normal S1 and S2. Normal heart sounds. No murmurs, gallops, or rubs.  ABDOMEN: Soft, nontender, nondistended.   EXTREMITIES: Right lower extremity edema from below the ankle, nontender without surrounding erythema. Peripheral pedal pulses palpated.   SKIN: No rashes or abrasions on visible skin  NEUROLOGIC: Oriented x3 with limited range of motion on exam  Vital Signs: Temp: 98.5  F (36.9  C) Temp src: Oral BP: (!) 162/101 Pulse: 70   Resp: 16 SpO2: 93 % O2 Device: None (Room air)    Weight: 82 lbs 6.4 oz            Data     I have personally reviewed the following data over the past 24 hrs:    7.6  \   11.2 (L)   / 211     139 103 54.4 (H) /  106 (H)   4.5 23 1.91 (H) \     Procal: N/A CRP: 46.70 (H) Lactic Acid: N/A       INR:  N/A PTT:  N/A   D-dimer:  17.30 (H) Fibrinogen:  N/A

## 2023-09-07 NOTE — CONSULTS
[facility]  Transplant Nephrology Consult  Date of Admission:  9/5/2023  Today's Date: 09/06/2023  Requesting physician: Kianna Valdez MD    Recommendations:  - continue tacrolimus goal ~4, check 12hr FK trough  - currently dexamethasone 6 mg po every day with recent COVID, once  complete resume prednisone 5 mg po every day  - hydralazine 25 mg po q8 to goal BP <140/90,  - would hold lisinopril if continued uptrend in Cr     Assessment & Plan   # LDKT: uptrend.               - Baseline Creatinine: ~ 1.3-1.6              - Proteinuria: Not checked recently, but previously was moderate at ~ 2 grams              - Date DSA Last Checked: Apr/2018      Latest DSA: Not checked recently due to time from transplant              - BK Viremia: Not checked recently due to time from transplant              - Kidney Tx Biopsy: Feb 24, 2006; Result: Mild acute cellular-mediated rejection.  Mild interstitial fibrosis and tubular atrophy.                                              Jul 18, 2005; Result: Mild acute cellular-mediated rejection.  Mild interstitial fibrosis and tubular atrophy.                                              Jun 24, 2005; Result: Mild acute cellular-mediated rejection.  Mild interstitial fibrosis and tubular atrophy.                                              May 04, 2005; Result: Mild acute cellular-mediated rejection.  Mild interstitial fibrosis and tubular atrophy.     # Immunosuppression Prior to Admission: Tacrolimus immediate release (goal 4-6) and Prednisone (dose 5 mg daily)              - Recently, patient was on sirolimus with goal level 4-6, but changed to tacrolimus in preparation for surgery in June.              - Present Immunosuppression: Tacrolimus immediate release (goal 4-6) and Dexamethasone (dose 6 mg daily)              - Patient is in an immunosuppressed state and will continue to monitor for efficacy and toxicity of  immunosuppression medications.              - Changes: Not at this time; After patient is done with dexamethasone, would restart prednisone 5 mg daily.     # Infection Prophylaxis:                   Last CD4 Level: 225 (Dec/2021)  - PJP: None     # Hypertension: Borderline control;   Goal BP: < 140/90 (Hospitalization goal)              - Volume status: Euvolemic              - Changes: Not at this time; Would continue in metoprolol and hydralazine, but hold lisinopril for AMY.     # Anemia in Chronic Renal Disease: Hgb: Stable      JONATHAN: No              - Iron studies: Low iron saturation     # Mineral Bone Disorder:   - Vitamin D; level: Not checked recently        On supplement: Yes  - Calcium; level: Normal        On supplement: Yes     # Electrolytes:   - Potassium; level: Normal        On supplement: No  - Magnesium; level: Normal        On supplement: Yes  - Bicarbonate; level: Normal        On supplement: No     # COVID Infection: Patient with new onset symptoms, including respiratory and mild GI symptoms.  Okay oxygenation.  No overt pneumonia, at least yet, on CXR.  Now with diarrhea, likely related to COVID.  Started on remdesivir and dexamethasone.  Trend down in CRP inflammation.     # HFrEF: Last cardiac echo (Aug/2023) showed borderline LVEF ~ 50-55% with thinning and akinesis of the mid anteroseptal segments.  Also noted to have moderate pericardial effusion.     # CAD, s/p PCI: No anginal symptoms at present.     # Pericardial Effusion: Patient with moderate pericardial effusion on most recent cardiac echo.  No evidence of tamponade physiology Cardiology recommended just following. Repeat echo     # PAD/AAA: Now recently with recent repair of right femoral artery pseudoaneurysm following previous infrarenal AAA, s/p EVAR with fem-fem bypass.  Followed by Vascular Surgery.     # COPD: some shortness of breath, recent COVID.On inhalers.     # Lung Cancer: Patient with recent CT chest Aug/2023 increased  size of right upper lobe nodularity adjusting evolving postradiation changed, but recommended continued close follow up.  Felt to be intermediate risk.  Followed by Pulmonary.     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  Could have higher EBV viremia in the setting of acute illness, but would not be treated.              - Recommend checking EBV PCR in ~ 4-6 weeks following this acute illness.     # Transplant History:  Etiology of Kidney Failure: Alport's syndrome  Tx: LDKT  Transplant: 9/20/2004 (Kidney)  Significant changes in immunosuppression:  Decreased immunosuppression due to recurrent cancers.  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  Other significant PMH included CAD, HFrEF, AAA, PAD, lung cancer, COPD, anal cancer, recurrent skin cancer, h/o DVT and EBV viremia.  Patient previously had been on sirolimus and prednisone for immunosuppression, but had sirolimus changed to tacrolimus due to planned surgery in June for right femoral pseudoaneurysm repair. She was recently hospitalized for COVID infection c/b hypoxia,treated with dexamethasone, remdesevir, discharged home 9/1  now presenting with shortness of breath and hypoxia. She was started on steroids for possible COPD exacerbation, restarted on dexamethasone and nebulizers. Echo didn't reveal tamponade.     Review of Systems   The 10 point Review of Systems is negative other than noted in the HPI or here.     Past Medical History    I have reviewed this patient's medical history and updated it with pertinent information if needed.   Past Medical History:   Diagnosis Date    Abnormal coagulation profile      p [id number] heterozygote     Age-related osteoporosis without current pathological fracture 06/22/2019    Anemia     Antiplatelet or antithrombotic long-term use     ASCUS with positive high risk HPV 2007, 2015    + HPV 56, 54,& 6, colp - TAL III, Leep =TAL II    Basal cell carcinoma     Congestive heart failure, unspecified HF chronicity, unspecified heart failure type (H) 06/22/2021    COPD (chronic obstructive pulmonary disease) (H)     Depressive disorder 07/2015    Heart attack (H)     History of blood transfusion     Hypertension     Immunosuppressed status (H)     due meds    Kidney replaced by transplant 09/2004    Living donor recipient,  Rejection 7/2005    LSIL (low grade squamous intraepithelial lesion) on Pap smear 04/2013    +HPV 33 or 45, 61      PAD (peripheral artery disease) (H)     PONV (postoperative nausea and vomiting)     Squamous cell lung cancer (H)     Thrombosis of leg 1967    Unspecified disorder of kidney and ureter     X-linked dominant Alport's syndrome.       Past Surgical History   I have reviewed this patient's surgical history and updated it with pertinent information if needed.  Past Surgical History:   Procedure Laterality Date    BIOPSY      Kidney, Lung, Breast    BIOPSY ANAL N/A 03/14/2018    Procedure: BIOPSY ANAL;;  Surgeon: Shabbir Leo MD;  [facility]    BYPASS GRAFT FEMORAL FEMORAL Bilateral 04/25/2021    Procedure: Axplantation infected graft, femoral to femoral bypass with cadaveric artery, bilateral SATORIUS MUSCLE FLAP CREATION, evacuation of absece, vacuum closure;  Surgeon: Raven Lewis MD;  [facility]    COLONOSCOPY      COLONOSCOPY N/A 08/09/2017    Procedure: COMBINED COLONOSCOPY, SINGLE OR MULTIPLE BIOPSY/POLYPECTOMY BY BIOPSY;;  Surgeon: Sushil Hyatt MD;  [facility]: [facility]    COLONOSCOPY N/A 7/28/2022    Procedure: COLONOSCOPY, WITH BIOPSY;  Surgeon: Moise Corcoran MD;  [facility]    COLPOSCOPY,LOOP ELECTRD CERVIX EXCIS   03/11/2008    TAL II    CONIZATION LEEP  07/17/2013    Procedure: CONIZATION LEEP;;  Surgeon: Liliana Renteria MD;  [facility]    CONIZATION LEEP N/A 08/17/2016    Procedure: CONIZATION LEEP;  Surgeon: Liliana Renteria MD;  [facility]    CV CORONARY ANGIOGRAM N/A 04/06/2021    Procedure: CV CORONARY ANGIOGRAM;  Surgeon: Sincere Rosas MD;  Location:  [facility]    CV CORONARY ANGIOGRAM N/A 04/25/2021    Procedure: Coronary Angiogram;  Surgeon: Sincere Rosas MD;  Location:  [facility]    CV PCI STENT DRUG ELUTING N/A 04/06/2021    Procedure: Percutaneous Coronary Intervention Stent Drug Eluting;  Surgeon: Sincere Rosas MD;  Location:  [facility]    ENDOVASCULAR REPAIR ANEURYSM AORTOILIAC Bilateral 04/06/2021    Procedure: Endovascular Abdominal Aortic Aneurysm Repair with Aortouniiliac Device and Femoral-Femoral Artery Bypass with [id number] Artegraft;  Surgeon: Abena Ferrara MD;  [facility]    ESOPHAGOSCOPY, GASTROSCOPY, DUODENOSCOPY (EGD), COMBINED N/A 2/2/2023    Procedure: ESOPHAGOGASTRODUODENOSCOPY, WITH BIOPSY;  Surgeon: Yazan Heredia MD;  Location:  GI    EXAM UNDER ANESTHESIA ANUS  07/15/2014    Procedure: EXAM UNDER ANESTHESIA ANUS;  Surgeon: Radha Musa MD;  [facility]    EXAM UNDER ANESTHESIA ANUS N/A 03/14/2018    Procedure: EXAM UNDER ANESTHESIA ANUS;  Anal Exam Under Anesthesia With Excision of anal lesion, proctoscopy;  Surgeon: Shabbir Leo MD;  [facility]    EYE SURGERY      GENITOURINARY SURGERY      IR OR ANGIOGRAM  04/06/2021    IRRIGATION AND DEBRIDEMENT GROIN Right 04/24/2021    Procedure: IRRIGATION AND DEBRIDEMENT, INGUINAL REGION, I & D PF RIGHT GROIN;  Surgeon: Raven Lweis MD;  [facility]    IRRIGATION AND DEBRIDEMENT GROIN N/A 04/26/2021    Procedure: IRRIGATION AND DEBRIDEMENT, INGUINAL REGION, PLACEMENT OF VERAFLO WOUND VAC,  WOUND WASHOUT,;  Surgeon: Raven Lewis MD;  [facility]    LASER CO2 EXCISE VULVA WIDE LOCAL  07/15/2014    Procedure: LASER CO2 EXCISE VULVA WIDE LOCAL;  Surgeon: Liliana Renteria MD;  [facility]    LASER CO2 VAGINA  07/17/2013    Procedure: LASER CO2 VAGINA;;  Surgeon: Liliana Renteria MD;  [facility]    LASER CO2 VAGINA N/A 09/25/2018    Procedure: LASER CO2 VAGINA;  Exam Under Anesthesia, CO2 Laser Ablation of Upper Vagina and Cervix;  Surgeon: Pati Garcia MD;  [facility]    MICROSCOPY ANAL  07/17/2013    Procedure: MICROSCOPY ANAL;  Anal Microscopy,  EUA vagina,Colposcopy Of Vagina And Vulva, Vaginal Biopsies, Omniguide Co2 Laser To Vagina and vulva, Loop Electrosurgical Excision Procedure To Cervix;  Surgeon: Radha Musa MD;  [facility]    MICROSCOPY ANAL  07/15/2014    Procedure: MICROSCOPY ANAL;  Surgeon: Radha Musa MD;  [facility]    PICC DOUBLE LUMEN PLACEMENT Right 04/30/2021    39cm, Basilic vein    PSEUDOANEURYSM REPAIR Right 6/27/2023    Procedure: RIGHT FEMORAL TO PROFUNDA FEMORIS ARTERY BYPASS WITH cadaveric femoral-popliteal artery, REPAIR OF RIGHT GROIN PSEUDOANEURYSM;  Surgeon: Abena Ferrara MD;  [facility]    TRANSESOPHAGEAL ECHOCARDIOGRAM INTRAOPERATIVE N/A 04/28/2021    Procedure: ECHOCARDIOGRAM, TRANSESOPHAGEAL, INTRAOPERATIVE;  Surgeon: GENERIC ANESTHESIA PROVIDER;  Location: [facility]    VASCULAR SURGERY      Thrombectomy    [facility] NONSPECIFIC PROCEDURE      Thrombectomy    [facility] NONSPECIFIC PROCEDURE  1955 and 1959    Bilater eye surgery - correction for crossed eyes    [facility] NONSPECIFIC PROCEDURE  1998    oopherectomy L    [facility] NONSPECIFIC PROCEDURE  1967    open kidney biopsy - L    [facility] TRANSPLANTATION OF KIDNEY  09/2004    recipient -- done at [facility]       Family History   I have reviewed this patient's family history and updated it with pertinent information if needed.   Family History   Problem Relation Age of Onset     Diabetes Father     Alcohol/Drug Father     Arthritis Father     Hypertension Father     Lipids Father         high cholesterol    Arthritis Mother     Diabetes Mother     Depression Mother     Heart Disease Mother     Neurologic Disorder Mother     Obesity Mother     Psychotic Disorder Mother     Thyroid Disease Mother     Hypertension Mother     Gynecology Sister         Precancerous cell removal from cervix at age 45    Depression Sister     Allergies Sister     Alcohol/Drug Sister     Neurologic Disorder Sister     Cerebrovascular Disease Paternal Grandmother     Diabetes Paternal Grandmother     Alcohol/Drug Son     Colon Polyps Sister     Breast Cancer Niece     Other Cancer Sister         Cervical    Obesity Sister     Depression Sister     Substance Abuse Son     Substance Abuse Sister             Depression Sister     Asthma Other     Colon Cancer No family hx of     Crohn's Disease No family hx of     Ulcerative Colitis No family hx of     Melanoma No family hx of     Skin Cancer No family hx of        Social History   I have reviewed this patient's social history and updated it with pertinent information if needed. Maribel Yang  reports that she has been smoking cigarettes. She started smoking about 56 years ago. She has a 10.50 pack-year smoking history. She has never used smokeless tobacco. She reports that she does not currently use alcohol. She reports that she does not currently use drugs after having used the following drugs: Marijuana.    Allergies   Allergies   Allergen Reactions    Blood Transfusion Related (Informational Only) Other (See Comments)     Patient has a history of a clinically significant antibody against RBC antigens.  A delay in compatible RBCs may occur.    Tramadol-Acetaminophen Nausea and Vomiting and Hives    Hydrocodone Nausea and Vomiting and Hives     Prior to Admission Medications    aspirin  81 mg Oral Daily    atorvastatin  80 mg Oral Daily    calcium  "carbonate-vitamin D  1 tablet Oral BID    clopidogrel  75 mg Oral Daily    dexAMETHasone  6 mg Oral Daily    fluticasone  1 spray Both Nostrils Daily    heparin ANTICOAGULANT  5,000 Units Subcutaneous Q12H    ipratropium-albuterol  1 puff Inhalation 4x daily    isosorbide mononitrate  60 mg Oral Daily    lisinopril  2.5 mg Oral Daily    magnesium oxide  400 mg Oral Daily    metoprolol tartrate  100 mg Oral Daily    pantoprazole  40 mg Oral QAM AC    sodium chloride (PF)  3 mL Intracatheter Q8H    tacrolimus  2 mg Oral BID    torsemide  10 mg Oral Daily         Physical Exam   Temp  Av  F (36.7  C)  Min: 97.6  F (36.4  C)  Max: 98.2  F (36.8  C)      Pulse  Av.9  Min: 67  Max: 95 Resp  Av.9  Min: 16  Max: 30  SpO2  Av %  Min: 91 %  Max: 100 %     BP (!) 179/105 (BP Location: Right arm)   Pulse 83   Temp 98.2  F (36.8  C) (Oral)   Resp 16   Ht 1.549 m (5' 1\")   Wt 37.4 kg (82 lb 6.4 oz)   SpO2 93%   BMI 15.57 kg/m     Date 23 07 - 23 0659   Shift 6440-2429 8003-9256 5479-2928 24 Hour Total   INTAKE   P.O. 800 360  1160   Shift Total(mL/kg) 800(21.4) 360(9.63)  1160(31.04)   OUTPUT   Shift Total(mL/kg)       Weight (kg) 37.38 37.38 37.38 37.38      Admit Weight: 37.6 kg (83 lb)     GENERAL APPEARANCE: alert and no distress  HENT: mouth without ulcers or lesions  RESP: lungs clear to auscultation - no rales, rhonchi or wheezes  CV: regular rhythm, normal rate, no rub, no murmur  EDEMA: no LE edema bilaterally  ABDOMEN: soft, nondistended, nontender, bowel sounds normal  MS: extremities normal - no gross deformities noted, no evidence of inflammation in joints, no muscle tenderness  SKIN: no rash    Data   CMP  Recent Labs   Lab 23  0727 23  1850 23  1017 23  0544 23  0618     --  138 137 138   POTASSIUM 4.6  --  3.8 5.1 4.8   CHLORIDE 102  --  103 103 105   CO2 24  --  23 24 22   ANIONGAP 11  --  12 10 11   *  --  119* 115* 93   BUN " 47.9*  --  48.3* 60.6* 45.0*   CR 1.89*  --  1.74* 2.10* 2.12*   GFRESTIMATED 28*  --  31* 25* 24*   KAYKAY 9.3  --  8.8 8.7* 8.6*   MAG 1.9 1.9 1.1*  --  2.1   PHOS 3.6  --  1.8*  --   --    PROTTOTAL  --   --  6.1* 5.5*  --    ALBUMIN  --   --  3.3* 3.1*  --    BILITOTAL  --   --  0.3 0.2  --    ALKPHOS  --   --  108* 125*  --    AST  --   --  18 30  --    ALT  --   --  <5 14  --      CBC  Recent Labs   Lab 09/06/23  0727 09/05/23  1017 09/01/23  0544 08/31/23  0618   HGB 10.8* 11.0* 10.6* 10.1*   WBC 5.8 8.5 3.7* 3.0*   RBC 4.12 4.14 3.85 3.84   HCT 34.4* 34.3* 33.2* 32.8*   MCV 84 83 86 85   MCH 26.2* 26.6 27.5 26.3*   MCHC 31.4* 32.1 31.9 30.8*   RDW 17.1* 17.0* 16.2* 16.4*    155 212 211     INRNo lab results found in last 7 days.  ABGNo lab results found in last 7 days.   Urine Studies  Recent Labs   Lab Test 08/31/23  0447 03/02/23  1400 06/23/21  0505 06/13/21  1819   COLOR Light Yellow Light Yellow Straw Light Yellow   APPEARANCE Clear Clear Clear Clear   URINEGLC Negative Negative Negative Negative   URINEBILI Negative Negative Negative Negative   URINEKETONE Negative Negative Negative Negative   SG 1.013 1.011 1.010 1.011   UBLD Trace* Negative Negative Negative   URINEPH 6.0 5.5 6.5 5.5   PROTEIN 100* 20* Negative 70*   NITRITE Negative Negative Negative Negative   LEUKEST Negative Negative Negative Negative   RBCU 3* 2  --  1   WBCU <1 1  --  3     Recent Labs   Lab Test 05/03/22  1459 04/24/18  1812 01/31/17  1030 06/03/16  0900 10/22/15  1024 06/25/15  1007   UTPG 2.58* 1.04* 1.67* 0.76* 0.44* 0.31*     PTH  Recent Labs   Lab Test 06/12/19  1810   PTHI 89*     Iron Studies  Recent Labs   Lab Test 06/21/23  1133 01/25/23  1536 06/22/22  1551   IRON 40 45 30*    228* 250   IRONSAT 16 20 12*   AVERY 86 410* 82       IMAGING:  All imaging studies reviewed by me.

## 2023-09-07 NOTE — PLAN OF CARE
Goal Outcome Evaluation:      Plan of Care Reviewed With: patient    Overall Patient Progress: no changeOverall Patient Progress: no change    Outcome Evaluation:     A&Ox4. VSS but hypertensive through the night. Provider notified and prn hydralazine ordered. Goal of SBP <140 with medication. BP remains high, provider notified and pt presents no other symptoms. Pt pulse ox remains above 92 on RA throughout shift.

## 2023-09-07 NOTE — PROGRESS NOTES
[facility]  Transplant Nephrology Consult  Date of Admission:  9/5/2023  Today's Date: 09/07/2023  Requesting physician: Kianna Valdez MD    Recommendations:  - hold lisinopril and torsemide if further uptrend  - added 12 hr FK trough pending  - continue tacrolimus goal ~4, check 12hr FK trough  - currently dexamethasone 6 mg po every day with recent COVID, once  complete resume prednisone 5 mg po every day  - increase hydralazine 50 mg po q8 to goal BP <140/90,      Assessment & Plan   # LDKT: uptrend.               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  Mild interstitial fibrosis and tubular atrophy.                                              Jul 18, 2005; Result: Mild acute cellular-mediated rejection.  Mild interstitial fibrosis and tubular atrophy.                                              Jun 24, 2005; Result: Mild acute cellular-mediated rejection.  Mild interstitial fibrosis and tubular atrophy.                                              May 04, 2005; Result: Mild acute cellular-mediated rejection.  Mild interstitial fibrosis and tubular atrophy.     # Immunosuppression Prior to Admission: Tacrolimus immediate release (goal 4-6) and Prednisone (dose 5 mg daily)              - Recently, patient was on sirolimus with goal level 4-6, but changed to tacrolimus in preparation for surgery in June.              - Present Immunosuppression: Tacrolimus immediate release (goal 4-6) and Dexamethasone (dose 6 mg daily)              - Patient is in an immunosuppressed state and will  continue to monitor for efficacy and toxicity of immunosuppression medications.              - Changes: Not at this time; After patient is done with dexamethasone, would restart prednisone 5 mg daily.     # Infection Prophylaxis:                   Last CD4 Level: 225 (Dec/2021)  - PJP: None     # Hypertension: Borderline control;   Goal BP: < 140/90 (Hospitalization goal)              - Volume status: Euvolemic              - Changes: Not at this time; Would continue in metoprolol and hydralazine, but hold lisinopril for AMY.     # Anemia in Chronic Renal Disease: Hgb: Stable      JONATHAN: No              - Iron studies: Low iron saturation     # Mineral Bone Disorder:   - Vitamin D; level: Not checked recently        On supplement: Yes  - Calcium; level: Normal        On supplement: Yes     # Electrolytes:   - Potassium; level: Normal        On supplement: No  - Magnesium; level: Normal        On supplement: Yes  - Bicarbonate; level: Normal        On supplement: No     # COVID Infection: Patient with new onset symptoms, including respiratory and mild GI symptoms.  Okay oxygenation.  No overt pneumonia, at least yet, on CXR.  Now with diarrhea, likely related to COVID.  Started on remdesivir and dexamethasone.  Trend down in CRP inflammation.     # HFrEF: Last cardiac echo (Aug/2023) showed borderline LVEF ~ 50-55% with thinning and akinesis of the mid anteroseptal segments.  Also noted to have moderate pericardial effusion.     # CAD, s/p PCI: No anginal symptoms at present.     # Pericardial Effusion: Patient with moderate pericardial effusion on most recent cardiac echo.  No evidence of tamponade physiology Cardiology recommended just following. Repeat echo     # PAD/AAA: Now recently with recent repair of right femoral artery pseudoaneurysm following previous infrarenal AAA, s/p EVAR with fem-fem bypass.  Followed by Vascular Surgery.     # COPD: some shortness of breath, recent COVID.On inhalers.     # Lung  Cancer: Patient with recent CT chest Aug/2023 increased size of right upper lobe nodularity adjusting evolving postradiation changed, but recommended continued close follow up.  Felt to be intermediate risk.  Followed by Pulmonary.     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  Could have higher EBV viremia in the setting of acute illness, but would not be treated.              - Recommend checking EBV PCR in ~ 4-6 weeks following this acute illness.     # Transplant History:  Etiology of Kidney Failure: Alport's syndrome  Tx: LDKT  Transplant: 9/20/2004 (Kidney)  Significant changes in immunosuppression:  Decreased immunosuppression due to recurrent cancers.  Significant transplant-related complications: EBV Viremia and Recurrent Skin Cancers    Flaca Alejandre MD  Pager: [phone]    Interval Hx:  Doing better overall, breathing improved sating 93% on RA  Hypertensive started on scheduled hydralazine    Review of Systems  The 10 point Review of Systems is negative other than noted in the HPI or here.       Allergies   Allergies   Allergen Reactions    Blood Transfusion Related (Informational Only) Other (See Comments)     Patient has a history of a clinically significant antibody against RBC antigens.  A delay in compatible RBCs may occur.    Tramadol-Acetaminophen Nausea and Vomiting and Hives    Hydrocodone Nausea and Vomiting and Hives     Prior to Admission Medications    aspirin  81 mg Oral Daily    atorvastatin  80 mg Oral Daily    calcium carbonate-vitamin D  1 tablet Oral BID    clopidogrel  75 mg Oral Daily    dexAMETHasone  6 mg Oral Daily    fluticasone  1 spray Both Nostrils Daily    heparin ANTICOAGULANT  5,000 Units Subcutaneous Q12H    hydrALAZINE  25 mg Oral Q8H APARNA    ipratropium-albuterol  1 puff Inhalation 4x daily    isosorbide mononitrate  60 mg Oral Daily    lisinopril  2.5 mg Oral Daily    magnesium oxide  400 mg Oral Daily    metoprolol tartrate  100 mg Oral Daily     "pantoprazole  40 mg Oral QAM AC    sodium chloride (PF)  3 mL Intracatheter Q8H    tacrolimus  2 mg Oral BID    torsemide  10 mg Oral Daily         Physical Exam   Temp  Av  F (36.7  C)  Min: 97.6  F (36.4  C)  Max: 98.2  F (36.8  C)      Pulse  Av.9  Min: 67  Max: 95 Resp  Av.9  Min: 16  Max: 30  SpO2  Av %  Min: 91 %  Max: 100 %     BP (!) 162/101   Pulse 70   Temp 98.5  F (36.9  C) (Oral)   Resp 16   Ht 1.549 m (5' 1\")   Wt 37.4 kg (82 lb 6.4 oz)   SpO2 93%   BMI 15.57 kg/m     Date 23 07 - 23 0659   Shift 2775-8106 7135-2679 4571-1425 24 Hour Total   INTAKE   P.O. 800 360  1160   Shift Total(mL/kg) 800(21.4) 360(9.63)  1160(31.04)   OUTPUT   Shift Total(mL/kg)       Weight (kg) 37.38 37.38 37.38 37.38      Admit Weight: 37.6 kg (83 lb)     GENERAL APPEARANCE: alert and no distress  HENT: mouth without ulcers or lesions  RESP: lungs clear to auscultation - no rales, rhonchi or wheezes  CV: regular rhythm, normal rate, no rub, no murmur  EDEMA: no LE edema bilaterally  ABDOMEN: soft, nondistended, nontender, bowel sounds normal  MS: extremities normal - no gross deformities noted, no evidence of inflammation in joints, no muscle tenderness  SKIN: no rash    Data   CMP  Recent Labs   Lab 23  0626 23  0727 23  1850 23  1017 23  0544    137  --  138 137   POTASSIUM 4.5 4.6  --  3.8 5.1   CHLORIDE 103 102  --  103 103   CO2   --   24   ANIONGAP 13 11  --  12 10   * 115*  --  119* 115*   BUN 54.4* 47.9*  --  48.3* 60.6*   CR 1.91* 1.89*  --  1.74* 2.10*   GFRESTIMATED 28* 28*  --  31* 25*   KAYKAY 9.3 9.3  --  8.8 8.7*   MAG 1.8 1.9 1.9 1.1*  --    PHOS 2.7 3.6  --  1.8*  --    PROTTOTAL  --   --   --  6.1* 5.5*   ALBUMIN  --   --   --  3.3* 3.1*   BILITOTAL  --   --   --  0.3 0.2   ALKPHOS  --   --   --  108* 125*   AST  --   --   --  18 30   ALT  --   --   --  <5 14     CBC  Recent Labs   Lab 23  0623  0713 " 09/05/23  1017 09/01/23  0544   HGB 11.2* 10.8* 11.0* 10.6*   WBC 7.6 5.8 8.5 3.7*   RBC 4.21 4.12 4.14 3.85   HCT 35.7 34.4* 34.3* 33.2*   MCV 85 84 83 86   MCH 26.6 26.2* 26.6 27.5   MCHC 31.4* 31.4* 32.1 31.9   RDW 17.2* 17.1* 17.0* 16.2*    171 155 212     INRNo lab results found in last 7 days.  ABGNo lab results found in last 7 days.   Urine Studies  Recent Labs   Lab Test 08/31/23  0447 03/02/23  1400 06/23/21  0505 06/13/21  1819   COLOR Light Yellow Light Yellow Straw Light Yellow   APPEARANCE Clear Clear Clear Clear   URINEGLC Negative Negative Negative Negative   URINEBILI Negative Negative Negative Negative   URINEKETONE Negative Negative Negative Negative   SG 1.013 1.011 1.010 1.011   UBLD Trace* Negative Negative Negative   URINEPH 6.0 5.5 6.5 5.5   PROTEIN 100* 20* Negative 70*   NITRITE Negative Negative Negative Negative   LEUKEST Negative Negative Negative Negative   RBCU 3* 2  --  1   WBCU <1 1  --  3     Recent Labs   Lab Test 05/03/22  1459 04/24/18  1812 01/31/17  1030 06/03/16  0900 10/22/15  1024 06/25/15  1007   UTPG 2.58* 1.04* 1.67* 0.76* 0.44* 0.31*     PTH  Recent Labs   Lab Test 06/12/19  1810   PTHI 89*     Iron Studies  Recent Labs   Lab Test 06/21/23  1133 01/25/23  1536 06/22/22  1551   IRON 40 45 30*    228* 250   IRONSAT 16 20 12*   AVERY 86 410* 82       IMAGING:  All imaging studies reviewed by me.

## 2023-09-08 NOTE — PROGRESS NOTES
Care Management Follow Up    Length of Stay (days): 3    Expected Discharge Date: 09/09/2023     Concerns to be Addressed: no discharge needs identified     Patient plan of care discussed at interdisciplinary rounds: Yes    Anticipated Discharge Disposition: Home with HC RN/PT     Anticipated Discharge Services: Meals on Wheels  Anticipated Discharge DME: None    Patient/family educated on Medicare website which has current facility and service quality ratings: no  Education Provided on the Discharge Plan: Yes  Patient/Family in Agreement with the Plan: yes    Referrals Placed by CM/SW:  yes  Private pay costs discussed: Not applicable    Additional Information:      Patient's status reviewed by chart. PT recommend home with assist. Writer talked with pt and spouse Padilla about home care RN/PT. They agreed. Writer sent HC referrals out to [facility] to search for HC agency. Pt is not medically ready for discharge today, JIMMY in couple days.    [facility] the pt today at 1350.   [address] [place] [address]  P: [phone]  Fax: [phone]    Continue to monitor.    Silvia Lee RN  7C RN Care Coordinator  Phone: [phone]  Pager: [phone]  [facility] ([phone]) Saturday & Sunday; ([phone]) [facility] Recognized Holidays   Units: 5A, 5B & 5C  Pager: [phone]  Units: 6B, 6C & 6D    Pager: [phone]  Units: 7A, 7B, 7C & 7D    Pager: [phone]  Units: 6A & ICU   Pager: [phone]  Units: 5 Ortho, 5MS & WB ED Pager: [phone]  Units: 6MS, 8A & 10 ICU  Pager [phone]

## 2023-09-08 NOTE — PHARMACY-ADMISSION MEDICATION HISTORY
Pharmacist Admission Medication History    Admission medication history is complete. The information provided in this note is only as accurate as the sources available at the time of the update.    Medication reconciliation/reorder completed by provider prior to medication history? No    Information Source(s): 8/30 Medication Scribe Admission Medication History Note and 9/1 Discharge Summary    Pertinent Information: Patient was recently admitted 8/30-9/1, no medication changes from prior discharge to current admission.     Changes made to PTA medication list:  Added: None  Deleted: None  Changed: None    Medication History Completed By: Mayank Flor RPH 9/8/2023 1:07 PM    Prior to Admission medications    Medication Sig Last Dose Taking? Auth Provider Long Term End Date   Nutritional Supplements (BOOST VERY HIGH CALORIE) LIQD Take 8 oz by mouth 2 times daily   Magali Main MD     acetaminophen (TYLENOL) 325 MG tablet Take 2 tablets (650 mg) by mouth every 4 hours as needed for other (For optimal non-opioid multimodal pain management to improve pain control.)   Rob Warner MD     albuterol (PROAIR HFA/PROVENTIL HFA/VENTOLIN HFA) 108 (90 Base) MCG/ACT inhaler Inhale 1-2 puffs into the lungs every 6 hours as needed for shortness of breath, wheezing or cough   Nii Mckeon MD Yes    ASPIRIN LOW DOSE 81 MG chewable tablet CHEW AND SWALLOW 1 TABLET (81 MG) BY MOUTH DAILY   Lisa Martinez DO     atorvastatin (LIPITOR) 80 MG tablet Take 1 tablet (80 mg) by mouth daily   Sincere Rosas MD Yes    calcium carbonate-vitamin D (CALTRATE) 600-10 MG-MCG per tablet TAKE ONE TABLET BY MOUTH TWICE A DAY   Lisa Martinez DO     clopidogrel (PLAVIX) 75 MG tablet Take 1 tablet (75 mg) by mouth daily   Sincere Rosas MD Yes    esomeprazole (NEXIUM) 20 MG DR capsule Take 1 capsule (20 mg) by mouth every morning (before breakfast) Take 30-60 minutes before eating.   Lisa Martinez DO      fluticasone (FLONASE) 50 MCG/ACT nasal spray Spray 1 spray into both nostrils daily   Rob Warner MD     hydrALAZINE (APRESOLINE) 10 MG tablet Take 2.5 tablets (25 mg) by mouth 3 times daily for 3 days, THEN 1 tablet (10 mg) 3 times daily for 10 days.   Cassie Kwong MD Yes 9/14/23   isosorbide mononitrate (IMDUR) 60 MG 24 hr tablet Take 1 tablet (60 mg) by mouth daily   Marguerite Muñoz APRN CNP Yes    Lactobacillus-Inulin ([facility] DIGESTIVE [facility]) CAPS TAKE ONE CAPSULE BY MOUTH ONCE DAILY (DUE FOR PHYSICAL IN MARCH)   Lisa Martinez DO     lisinopril (ZESTRIL) 2.5 MG tablet Take 1 tablet (2.5 mg) by mouth daily   Cassie Kwong MD Yes    magnesium oxide (MAG-OX) 400 MG tablet Take 1 tablet (400 mg) by mouth daily for 83 days   Cassie Kwong MD  11/24/23   metoprolol tartrate (LOPRESSOR) 100 MG tablet Take 1 tablet (100 mg) by mouth daily   Marguerite Muñoz APRN CNP Yes    Nutritional Supplements (ENSURE CLEAR) LIQD Take 1 Bottle by mouth daily   Lisa Martinez DO     oxyCODONE (ROXICODONE) 5 MG tablet Take 1 tablet (5 mg) by mouth every 4 hours as needed for severe pain   Vy Leung APRN CNP No    predniSONE (DELTASONE) 5 MG tablet Take 1 tablet (5 mg) by mouth daily   Hitesh See MD Yes    psyllium (METAMUCIL/KONSYL) 58.6 % powder Take by mouth every morning   Reported, Patient     tacrolimus (GENERIC EQUIVALENT) 0.5 MG capsule Take 4 capsules (2 mg) by mouth 2 times daily   Hitesh See MD     torsemide (DEMADEX) 10 MG tablet Take 1 tablet (10 mg) by mouth daily Take an additional 10 MG every other day as needed. Additional use as directed by [facility].   Cassie Kwong MD Yes

## 2023-09-08 NOTE — PLAN OF CARE
Goal Outcome Evaluation:      Plan of Care Reviewed With: patient          Outcome Evaluation: Continues to be vitally stable on room air.  Up in room independently. Voiding well.  Denies pain.    R:  continue to monitor and treat per plan of care.

## 2023-09-08 NOTE — PLAN OF CARE
"Assumed cares [id number]     /76 (BP Location: Left arm)   Pulse 79   Temp 98.6  F (37  C) (Oral)   Resp 18   Ht 1.549 m (5' 1\")   Wt 37.2 kg (82 lb)   SpO2 94%   BMI 15.49 kg/m       Pain: Patient denied pain.   Neuro: A&Ox4.    Respiratory: lungs clear and equal, diminished in lower lobes. On RA. Dyspnea on exertion. SOB with activity.  Cardiac/Neurovascular: HR and pulse WDL. No numbness or tingling reported.   GI/: Adequate urine output. BM yesterday per patient.   Nutrition: Regular diet.   Activity: Up SBA with walker.   Skin: No concerns.   Lines: PIV L hand (SL).  Events this shift: Magnesium and phosphorus replacement this morning. Recheck labs 9/9 AM. Continue to monitor creatine level. Possible discharge tomorrow. This RN stopped pt care at 10AM due to pt being CMV+, flyer RN Una took over care.      Plan: Continue with plan of care.     Goal Outcome Evaluation:      Plan of Care Reviewed With: patient    Overall Patient Progress: no changeOverall Patient Progress: no change    Outcome Evaluation: Continue with plan of care.      "

## 2023-09-08 NOTE — PROGRESS NOTES
I, Michele Lowe, was present with the medical/DAYANA student who participated in the service and in the documentation of the note.  I have verified the history and personally performed the physical exam and medical decision making.  I agree with the assessment and plan of care as documented in the note.      Michele Lowe, PGY2      [facility]    History and Physical - Medicine Service, [facility] TEAM 1       Date of Admission:  9/5/2023    Assessment & Plan      Maribel Yang is a 72 year old female admitted on 9/5/2023. She has a history of previous hypoxia in the setting of recent COVID-19 infection (08/30/23) treated with 4 day course of Remdesivir and Dexamethasone, pericardial effusion measured at 1,6cm w/o evidence of tamponade, Chronic heart failure with recovered ejection fraction, COPD, and kidney transplant admitted on 09/05/2023 to the ED for weakness and shortness of breath at rest.     Changes Today  -Continue dexamethasone @6mg orally for 10 days or until clinical improvement of hypoxia   -Discontinue daily D-dimer and CRP due to improved clinical presentation and down tend   -Continue Blood pressure management with metoprolol and  hydralazine, Increased hydralazine from 50 mg to 100 mg q8h  -Hold lisinopril and torsemide due to elevated creatinine   -Follow up with Physical Therapy regarding disposition due to pt concerns about returning home documented in interval history (has stairs at home)      # Confirmed COVID-19 infection    # Acute Hypoxic Respiratory Failure secondary to COVID-19 infection  Pt with confirmed COVID-19 infection (08/30/23) treated with 3 day course of dexamethasone and remdesivir, dyspnea at rest, and non-productive cough on admission. Given the PE findings and lab values differential includes a most likely COVID-19 infection, less likely COPD exacerbation, and a cannot be missed cardiac tamponade. Given the subacute ongoing  presentation of dyspnea, nonproductive cough along with PE findings the most likely etiology is residual COVID-19 symptoms d/t 10 day isolation period not being complete. Additionally, overnight she began to desaturate on room air and was provided supplemental oxygen via nasal canula.  COPD exacerbation is likely given prior diagnosis of COPD, associated dyspnea at rest, and further recurrent desaturation, but is less likely given the afebrile lab tests, absent leukocytosis, and unremarkable CXR at initial presentation on 09/05. Cardiac tamponade is less likely but cannot be missed due to her history of pericardial effusion that recently expanded but is not supported by physical exam and lab findings, POCUS did not show tamponade  -Continue Combivent Respimat inhaler given COVID infection  -Continue dexamethasone given previous recurrent respiratory decompensation overnight @6mg/daily. Now improving and on Room air, plan to discontinue back to PTA prednisone on discharge  -Follow BMP daily and CBC every 3 days   - Heparin for AC  - F/up on cultures  - All extremities US given rise in D-dimer (likely in s/o COVID) w/o DVTs. Did show groin hematoma (decreased in size from prior imaging) and seemingly new possible hematoma vs infection vs seroma in R groin, largest dimension 1.9cm. Per patient this has been there for some time, unchanged in size, not bothersome to her      #Weakness  #Hypomagnesemia and hypophosphatemia  Patient presented with muscle weakness. Mg 1.1 on admission, muscle weakness rapidly improved after Mg administration. Had been having diarrhea a few days PTA along with poor PO intake. Suspect muscle weakness in the setting of hypomagnesemia that improved with administration of 400 mg Magnesium Oxide  - Replete Mg and Phos PRN due to severe malnutrition in the context of chronic illness per nutrition consult  - Ensure Enlive BID daily, and coordinate with social work to check insurance coverage for  oral nutrition supplements per nutrition consult  - OT/PT follow up given concerns about returning home and ability to complete ADLs  - Monitor Magnesium and phosphorus levels daily   - Increased magnesium oxide supplement from 400mg daily to BID, added MVI as well    # Chronic heart failure with preserved ejection fraction: heart failure noted on problem list and last echo with EF >50%     #HTN  #Pericardial Effussion  Patient with history of recent pericardial effusion with minimal expansion 08/30, orthopnea, and Dyspnea at rest, bilateral lower extremity edema, regular rate and rhythm without chest pain. BNP elevated 4,557 (previously 1871)  -Repeat limited echocardiogram unremarkable (09/05), continue to monitor clinically for changes regarding prior pericardial effusion, although less c/f tamponade given not hypotensive, no distant heart sounds  -Blood pressure goal during hospitalization of 140/90, managing with metoprolol @100 mg daily hydralazine (increased from 50->100 mg). 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 Baseline Cr 1.8-2.0. On admission, Cr elevated to 2.42. BUN 47.7, normal potassium. Likely secondary to prerenal issues with GI symptoms and poor oral intake with COVID. No acute indications for dialysis.   - Transplant Nephrology Consult, appreciate recs  - Tacrolimus elevated at 8.3 (09/07) with goal of  4-6, continue immediate release tacrolimus and restart Prednisone @ 5 mg daily after dexamethasone course finished per nephrology recommendations   - Monitor BUN and creatinine daily CMP  - Transplant nephrology will coordinate outpt f/up with her in ~4 weeks after discharge      # Cachexia: Estimated body mass  "index is 15.68 kg/m  as calculated from the following:    Height as of this encounter: 1.549 m (5' 1\").    Weight as of this encounter: 37.6 kg (83 lb).              Diet: Combination Diet Regular Diet Adult  Snacks/Supplements Adult: Ensure Enlive; With Meals    DVT Prophylaxis: Heparin SQ  Villavicencio Catheter: Not present  Fluids: None  Lines: None     Cardiac Monitoring: None  Code Status: Full Code         The patient's care was discussed with the attending Dr. José Miguel SMITH  Medical Student  Medicine Service, [facility] TEAM [address] [place] [address]  Securely message with [employer] (more info)  Text page via [facility] Paging/Directory   See signed in provider for up to date coverage information  ______________________________________________________________________    Chief Complaint   Dyspnea and generalized weakness    History is obtained from the patient    History of Present Illness   Maribel Yang is a 72 year old female who has a history of COVID-19 infection on 08/30/23 treated with 3 days of dexamethasone and remdesivir admitted to the ED on 09/05/23 for dyspnea and weakness. Maribel typically does not leave her home, but was watching her grandson earlier during the week of 08/30 prior to the acute onset of initial COVID symptoms. Following admission and treatment for COVID from 08/30 - 09/01 she showed signs of improvement and ultimately was discharged. Soon after she began having associated symptoms of dyspnea at rest and weakness that did not improve prompting her to return to the ED on 09/05 to seek further care.    Interval History  Maribel noted that there was significant improvement in her weakness and has not noticed further deficiencies. No acute events overnight, however pt noted that she is still concerned about her elevated blood pressure and ability to follow up with her primary care provider. Additionally, she is concerned about returning home due to the " amount of stairs (12-14) needed eclipsed to get between bedroom and kitchen. Was not requiring supplemental oxygen when seen in the morning. Day 3/10 of dexamethasone 10 mg injection (09/05) followed by 6 mg daily (09/07) due to recurrent respiratory decompensation in the setting of confirmed COVID-19 infection (09/05) and will continue course for 10 days or until clinical improvement.     Past Medical History    Past Medical History:   Diagnosis Date    Abnormal coagulation profile     p [id number] heterozygote     Age-related osteoporosis without current pathological fracture 06/22/2019    Anemia     Antiplatelet or antithrombotic long-term use     ASCUS with positive high risk HPV 2007, 2015    + HPV 56, 54,& 6, colp - TAL III, Leep =TAL II    Basal cell carcinoma     Congestive heart failure, unspecified HF chronicity, unspecified heart failure type (H) 06/22/2021    COPD (chronic obstructive pulmonary disease) (H)     Depressive disorder 07/2015    Heart attack (H)     History of blood transfusion     Hypertension     Immunosuppressed status (H)     due meds    Kidney replaced by transplant 09/2004    Living donor recipient,  Rejection 7/2005    LSIL (low grade squamous intraepithelial lesion) on Pap smear 04/2013    +HPV 33 or 45, 61      PAD (peripheral artery disease) (H)     PONV (postoperative nausea and vomiting)     Squamous cell lung cancer (H)     Thrombosis of leg 1967    Unspecified disorder of kidney and ureter     X-linked dominant Alport's syndrome.       Past Surgical History   Past Surgical History:   Procedure Laterality Date    BIOPSY      Kidney, Lung, Breast    BIOPSY ANAL N/A 03/14/2018    Procedure: BIOPSY ANAL;;  Surgeon: Shabbir Leo MD;  [facility]: [facility]    BYPASS GRAFT FEMORAL FEMORAL Bilateral 04/25/2021    Procedure: Axplantation infected graft, femoral to femoral bypass with cadaveric artery, bilateral SATORIUS MUSCLE FLAP CREATION, evacuation of absece, vacuum closure;  Surgeon:  Raven Lewis MD;  [facility]    COLONOSCOPY      COLONOSCOPY N/A 08/09/2017    Procedure: COMBINED COLONOSCOPY, SINGLE OR MULTIPLE BIOPSY/POLYPECTOMY BY BIOPSY;;  Surgeon: Sushil Hyatt MD;  [facility] GI    COLONOSCOPY N/A 7/28/2022    Procedure: COLONOSCOPY, WITH BIOPSY;  Surgeon: Moise Corcoran MD;  [facility]    COLPOSCOPY,LOOP ELECTRD CERVIX EXCIS  03/11/2008    TAL II    CONIZATION LEEP  07/17/2013    Procedure: CONIZATION LEEP;;  Surgeon: Liliana Renteria MD;  [facility]    CONIZATION LEEP N/A 08/17/2016    Procedure: CONIZATION LEEP;  Surgeon: Liliana Renteria MD;  [facility]    CV CORONARY ANGIOGRAM N/A 04/06/2021    Procedure: CV CORONARY ANGIOGRAM;  Surgeon: Sincere Rosas MD;  Location:  [facility]ARD[facility] CATH LAB    CV CORONARY ANGIOGRAM N/A 04/25/2021    Procedure: Coronary Angiogram;  Surgeon: Sincere Rosas MD;  Location:  [facility] CATH [facility]    CV PCI STENT DRUG ELUTING N/A 04/06/2021    Procedure: Percutaneous Coronary Intervention Stent Drug Eluting;  Surgeon: Sincere Rosas MD;  Location:  HE[facility] CARD[facility] CATH LAB    ENDOVASCULAR REPAIR ANEURYSM AORTOILIAC Bilateral 04/06/2021    Procedure: Endovascular Abdominal Aortic Aneurysm Repair with Aortouniiliac Device and Femoral-Femoral Artery Bypass with [id number] Artegraft;  Surgeon: Abena Ferrara MD;  [facility]    ESOPHAGOSCOPY, GASTROSCOPY, DUODENOSCOPY (EGD), COMBINED N/A 2/2/2023    Procedure: ESOPHAGOGASTRODUODENOSCOPY, WITH BIOPSY;  Surgeon: Yazan Heredia MD;  Location:  GI    EXAM UNDER ANESTHESIA ANUS  07/15/2014    Procedure: EXAM UNDER ANESTHESIA ANUS;  Surgeon: Radha Musa MD;  [facility]    EXAM UNDER ANESTHESIA ANUS N/A 03/14/2018    Procedure: EXAM UNDER ANESTHESIA ANUS;  Anal Exam Under Anesthesia With Excision of anal lesion, proctoscopy;  Surgeon: Shabbir Leo MD;  [facility]    EYE  SURGERY      GENITOURINARY SURGERY      IR OR ANGIOGRAM  04/06/2021    IRRIGATION AND DEBRIDEMENT GROIN Right 04/24/2021    Procedure: IRRIGATION AND DEBRIDEMENT, INGUINAL REGION, I & D PF RIGHT GROIN;  Surgeon: Raven Lewis MD;  [facility]    IRRIGATION AND DEBRIDEMENT GROIN N/A 04/26/2021    Procedure: IRRIGATION AND DEBRIDEMENT, INGUINAL REGION, PLACEMENT OF VERAFLO WOUND VAC, WOUND WASHOUT,;  Surgeon: Raven Lewis MD;  [facility]    LASER CO2 EXCISE VULVA WIDE LOCAL  07/15/2014    Procedure: LASER CO2 EXCISE VULVA WIDE LOCAL;  Surgeon: Liliana Renteria MD;  [facility]    LASER CO2 VAGINA  07/17/2013    Procedure: LASER CO2 VAGINA;;  Surgeon: Liliana Renteria MD;  [facility]    LASER CO2 VAGINA N/A 09/25/2018    Procedure: LASER CO2 VAGINA;  Exam Under Anesthesia, CO2 Laser Ablation of Upper Vagina and Cervix;  Surgeon: Pati Garcia MD;  [facility]    MICROSCOPY ANAL  07/17/2013    Procedure: MICROSCOPY ANAL;  Anal Microscopy,  EUA vagina,Colposcopy Of Vagina And Vulva, Vaginal Biopsies, Omniguide Co2 Laser To Vagina and vulva, Loop Electrosurgical Excision Procedure To Cervix;  Surgeon: Radha Musa MD;  [facility]    MICROSCOPY ANAL  07/15/2014    Procedure: MICROSCOPY ANAL;  Surgeon: Radha Musa MD;  [facility]    PICC DOUBLE LUMEN PLACEMENT Right 04/30/2021    39cm, Basilic vein    PSEUDOANEURYSM REPAIR Right 6/27/2023    Procedure: RIGHT FEMORAL TO PROFUNDA FEMORIS ARTERY BYPASS WITH cadaveric femoral-popliteal artery, REPAIR OF RIGHT GROIN PSEUDOANEURYSM;  Surgeon: Abena Ferrara MD;  [facility]    TRANSESOPHAGEAL ECHOCARDIOGRAM INTRAOPERATIVE N/A 04/28/2021    Procedure: ECHOCARDIOGRAM, TRANSESOPHAGEAL, INTRAOPERATIVE;  Surgeon: GENERIC ANESTHESIA PROVIDER;  Location: [facility]    VASCULAR SURGERY      Thrombectomy    Z NONSPECIFIC PROCEDURE      Thrombectomy    [facility] NONSPECIFIC PROCEDURE  1955 and 1959    Bilater eye surgery -  correction for crossed eyes    [facility] NONSPECIFIC PROCEDURE  1998    oopherectomy L    [facility] NONSPECIFIC PROCEDURE  1967    open kidney biopsy - L    [facility] TRANSPLANTATION OF KIDNEY  09/2004    recipient -- done at [facility]       Prior to Admission Medications   Prior to Admission Medications   Prescriptions Last Dose Informant Patient Reported? Taking?   ASPIRIN LOW DOSE 81 MG chewable tablet  Self No No   Sig: CHEW AND SWALLOW 1 TABLET (81 MG) BY MOUTH DAILY   Lactobacillus-Inulin ([employer] DIGESTIVE HEALTH) CAPS  Self No No   Sig: TAKE ONE CAPSULE BY MOUTH ONCE DAILY (DUE FOR PHYSICAL IN MARCH)   Nutritional Supplements (ENSURE CLEAR) LIQD  Self No No   Sig: Take 1 Bottle by mouth daily   acetaminophen (TYLENOL) 325 MG tablet  Self No No   Sig: Take 2 tablets (650 mg) by mouth every 4 hours as needed for other (For optimal non-opioid multimodal pain management to improve pain control.)   albuterol (PROAIR HFA/PROVENTIL HFA/VENTOLIN HFA) 108 (90 Base) MCG/ACT inhaler  Self No No   Sig: Inhale 1-2 puffs into the lungs every 6 hours as needed for shortness of breath, wheezing or cough   atorvastatin (LIPITOR) 80 MG tablet  Self No No   Sig: Take 1 tablet (80 mg) by mouth daily   calcium carbonate-vitamin D (CALTRATE) 600-10 MG-MCG per tablet  Self No No   Sig: TAKE ONE TABLET BY MOUTH TWICE A DAY   clopidogrel (PLAVIX) 75 MG tablet  Self No No   Sig: Take 1 tablet (75 mg) by mouth daily   esomeprazole (NEXIUM) 20 MG DR capsule  Self No No   Sig: Take 1 capsule (20 mg) by mouth every morning (before breakfast) Take 30-60 minutes before eating.   fluticasone (FLONASE) 50 MCG/ACT nasal spray  Self No No   Sig: Spray 1 spray into both nostrils daily   hydrALAZINE (APRESOLINE) 10 MG tablet   No No   Sig: Take 2.5 tablets (25 mg) by mouth 3 times daily for 3 days, THEN 1 tablet (10 mg) 3 times daily for 10 days.   isosorbide mononitrate (IMDUR) 60 MG 24 hr tablet  Self No No   Sig: Take 1 tablet (60 mg) by mouth daily    lisinopril (ZESTRIL) 2.5 MG tablet   No No   Sig: Take 1 tablet (2.5 mg) by mouth daily   magnesium oxide (MAG-OX) 400 MG tablet   No No   Sig: Take 1 tablet (400 mg) by mouth daily for 83 days   metoprolol tartrate (LOPRESSOR) 100 MG tablet  Self No No   Sig: Take 1 tablet (100 mg) by mouth daily   oxyCODONE (ROXICODONE) 5 MG tablet  Self No No   Sig: Take 1 tablet (5 mg) by mouth every 4 hours as needed for severe pain   predniSONE (DELTASONE) 5 MG tablet  Self No No   Sig: Take 1 tablet (5 mg) by mouth daily   psyllium (METAMUCIL/KONSYL) 58.6 % powder  Self Yes No   Sig: Take by mouth every morning   tacrolimus (GENERIC EQUIVALENT) 0.5 MG capsule  Self No No   Sig: Take 4 capsules (2 mg) by mouth 2 times daily   torsemide (DEMADEX) 10 MG tablet   No No   Sig: Take 1 tablet (10 mg) by mouth daily Take an additional 10 MG every other day as needed. Additional use as directed by [facility].      Facility-Administered Medications: None             Physical Exam   GENERAL APPEARANCE: pleasant, cachectic appearing, resting comfortably in bed, no acute distress  HEENT: Normocephalic and atraumatic.   LUNGS: Clear to auscultation but decreased bilaterally without wheezes, rhonchi, or rales.  HEART: RRR with normal S1 and S2. Normal heart sounds. No murmurs, gallops, or rubs.  ABDOMEN: Soft, nontender, nondistended.   EXTREMITIES: Right lower extremity edema from below the ankle, nontender without surrounding erythema. Peripheral pedal pulses palpated.   SKIN: No rashes or abrasions on visible skin  NEUROLOGIC: Oriented x3 with limited range of motion on exam  Vital Signs: Temp: 98.6  F (37  C) Temp src: Oral BP: 116/76 Pulse: 79   Resp: 18 SpO2: 94 % O2 Device: None (Room air)    Weight: 82 lbs 0 oz            Data     I have personally reviewed the following data over the past 24 hrs:    8.0  \   10.2 (L)   / 182     138 104 57.4 (H) /  81   4.3 23 2.10 (H) \     Procal: N/A CRP: 22.30 (H) Lactic Acid: N/A       INR:   N/A PTT:  N/A   D-dimer:  14.49 (H) Fibrinogen:  N/A

## 2023-09-08 NOTE — PROGRESS NOTES
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               - Baseline Creatinine: ~ 1.6-1.9 since Jan 2023 (previous Cr~1.3-1.6), and recent AMY to peak Cr-2.4 on Aug 30 in the setting of COVID, diarrhea, ...              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  Mild interstitial fibrosis and tubular atrophy.                                              Jul 18, 2005; Result: Mild acute cellular-mediated rejection.  Mild interstitial fibrosis and tubular atrophy.                                              Jun 24, 2005; Result: Mild acute cellular-mediated rejection.  Mild interstitial fibrosis and tubular atrophy.                                              May 04, 2005; Result: Mild acute cellular-mediated rejection.  Mild interstitial fibrosis and tubular atrophy.     # Immunosuppression Prior to Admission: Tacrolimus immediate release (goal 4-6) and Prednisone (dose 5 mg daily)              - Recently, patient was on sirolimus with goal level 4-6, but changed to tacrolimus in preparation for surgery in June.              - Present Immunosuppression: Tacrolimus immediate release (goal 4-6) and Dexamethasone  (dose 6 mg daily)              - Patient is in an immunosuppressed state and will continue to monitor for efficacy and toxicity of immunosuppression medications.              - Changes: Not at this time; After patient is done with dexamethasone, would restart prednisone 5 mg daily.     # Infection Prophylaxis:                   Last CD4 Level: 225 (Dec/2021)  - PJP: None     # Hypertension: Borderline control;   Goal BP: < 140/90 (Hospitalization goal)              - Volume status: Euvolemic              - Changes: Not at this time; Would continue in metoprolol and hydralazine, but hold lisinopril for AMY.     # Anemia in Chronic Renal Disease: Hgb: Stable      JONATHAN: No              - Iron studies: Low iron saturation     # Mineral Bone Disorder:   - Vitamin D; level: Not checked recently        On supplement: Yes  - Calcium; level: Normal        On supplement: Yes     # Electrolytes:   - Potassium; level: Normal        On supplement: No  - Magnesium; level: Normal        On supplement: Yes  - Bicarbonate; level: Normal        On supplement: No     # COVID Infection: Patient with new onset symptoms, including respiratory and mild GI symptoms.  Okay oxygenation.  No overt pneumonia, at least yet, on CXR.  Now with diarrhea, likely related to COVID.  Started on remdesivir and dexamethasone.  Trend down in CRP inflammation.     # HFrEF: Last cardiac echo (Aug/2023) showed borderline LVEF ~ 50-55% with thinning and akinesis of the mid anteroseptal segments.  Also noted to have moderate pericardial effusion.     # CAD, s/p PCI: No anginal symptoms at present.     # Pericardial Effusion: Patient with moderate pericardial effusion on most recent cardiac echo.  No evidence of tamponade physiology Cardiology recommended just following. Repeat echo     # PAD/AAA: Now recently with recent repair of right femoral artery pseudoaneurysm following previous infrarenal AAA, s/p EVAR with fem-fem bypass.  Followed by Vascular  Surgery.     # COPD: some shortness of breath, recent COVID.On inhalers.     # Lung Cancer: Patient with recent CT chest Aug/2023 increased size of right upper lobe nodularity adjusting evolving postradiation changed, but recommended continued close follow up.  Felt to be intermediate risk.  Followed by Pulmonary.     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  Could have higher EBV viremia in the setting of acute illness, but would not be treated.              - Recommend checking EBV PCR in ~ 4-6 weeks following this acute illness.     # Transplant History:  Etiology of Kidney Failure: Alport's syndrome  Tx: LDKT  Transplant: 9/20/2004 (Kidney)  Significant changes in immunosuppression:  Decreased immunosuppression due to recurrent cancers.  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       Allergies   Allergies   Allergen Reactions    Blood Transfusion Related (Informational Only) Other (See Comments)     Patient has a history of a clinically significant antibody against RBC antigens.  A delay in compatible RBCs may occur.    Tramadol-Acetaminophen Nausea and Vomiting and Hives    Hydrocodone Nausea and Vomiting and Hives     Prior to Admission Medications    aspirin  81 mg Oral Daily    atorvastatin  80 mg Oral Daily    calcium carbonate-vitamin D  1 tablet Oral BID    clopidogrel  75 mg Oral Daily    dexAMETHasone  6 mg Oral Daily    fluticasone  1 spray Both Nostrils Daily    heparin ANTICOAGULANT  5,000 Units Subcutaneous Q12H    hydrALAZINE  50 mg Oral Q8H APARNA    ipratropium-albuterol  1 puff Inhalation 4x daily    isosorbide mononitrate  60 mg Oral Daily    [Held by provider] lisinopril  2.5 mg Oral Daily    magnesium oxide  400 mg  "Oral BID    metoprolol tartrate  100 mg Oral Daily    multivitamin, therapeutic  1 tablet Oral Daily    pantoprazole  40 mg Oral QAM AC    potassium & sodium phosphates  1 packet Oral BID    sodium chloride (PF)  3 mL Intracatheter Q8H    tacrolimus  2 mg Oral BID    [Held by provider] torsemide  10 mg Oral Daily         Physical Exam   Temp  Av  F (36.7  C)  Min: 97.6  F (36.4  C)  Max: 98.2  F (36.8  C)      Pulse  Av.9  Min: 67  Max: 95 Resp  Av.9  Min: 16  Max: 30  SpO2  Av %  Min: 91 %  Max: 100 %     BP (!) 166/111 (BP Location: Left arm)   Pulse 79   Temp 98.6  F (37  C) (Oral)   Resp 18   Ht 1.549 m (5' 1\")   Wt 37.2 kg (82 lb)   SpO2 93%   BMI 15.49 kg/m     Date 23 07 - 23 0659   Shift 9105-6267 9471-4242 7129-4063 24 Hour Total   INTAKE   P.O. 800 360  1160   Shift Total(mL/kg) 800(21.4) 360(9.63)  1160(31.04)   OUTPUT   Shift Total(mL/kg)       Weight (kg) 37.38 37.38 37.38 37.38      Admit Weight: 37.6 kg (83 lb)     GENERAL APPEARANCE: alert and no distress  HENT: mouth without ulcers or lesions  RESP: lungs clear to auscultation - no rales, rhonchi or wheezes  CV: regular rhythm, normal rate, no rub, no murmur  EDEMA: no LE edema bilaterally  ABDOMEN: soft, nondistended, nontender, bowel sounds normal  MS: extremities normal - no gross deformities noted, no evidence of inflammation in joints, no muscle tenderness  SKIN: no rash    Data   CMP  Recent Labs   Lab 23  0606 23  0626 23  0727 23  1850 23  1017    139 137  --  138   POTASSIUM 4.3 4.5 4.6  --  3.8   CHLORIDE 104 103 102  --  103   CO2  23 24  --  23   ANIONGAP 11 13 11  --  12   GLC 81 106* 115*  --  119*   BUN 57.4* 54.4* 47.9*  --  48.3*   CR 2.10* 1.91* 1.89*  --  1.74*   GFRESTIMATED 25* 28* 28*  --  31*   KAYKAY 8.8 9.3 9.3  --  8.8   MAG 1.4* 1.8 1.9 1.9 1.1*   PHOS 2.0* 2.7 3.6  --  1.8*   PROTTOTAL  --   --   --   --  6.1*   ALBUMIN  --   --   --   --  3.3* "   BILITOTAL  --   --   --   --  0.3   ALKPHOS  --   --   --   --  108*   AST  --   --   --   --  18   ALT  --   --   --   --  <5     CBC  Recent Labs   Lab 09/08/23  0606 09/07/23  0626 09/06/23  0727 09/05/23  1017   HGB 10.2* 11.2* 10.8* 11.0*   WBC 8.0 7.6 5.8 8.5   RBC 3.83 4.21 4.12 4.14   HCT 31.7* 35.7 34.4* 34.3*   MCV 83 85 84 83   MCH 26.6 26.6 26.2* 26.6   MCHC 32.2 31.4* 31.4* 32.1   RDW 17.2* 17.2* 17.1* 17.0*    211 171 155     INRNo lab results found in last 7 days.  ABGNo lab results found in last 7 days.   Urine Studies  Recent Labs   Lab Test 08/31/23  0447 03/02/23  1400 06/23/21  0505 06/13/21  1819   COLOR Light Yellow Light Yellow Straw Light Yellow   APPEARANCE Clear Clear Clear Clear   URINEGLC Negative Negative Negative Negative   URINEBILI Negative Negative Negative Negative   URINEKETONE Negative Negative Negative Negative   SG 1.013 1.011 1.010 1.011   UBLD Trace* Negative Negative Negative   URINEPH 6.0 5.5 6.5 5.5   PROTEIN 100* 20* Negative 70*   NITRITE Negative Negative Negative Negative   LEUKEST Negative Negative Negative Negative   RBCU 3* 2  --  1   WBCU <1 1  --  3     Recent Labs   Lab Test 05/03/22  1459 04/24/18  1812 01/31/17  1030 06/03/16  0900 10/22/15  1024 06/25/15  1007   UTPG 2.58* 1.04* 1.67* 0.76* 0.44* 0.31*     PTH  Recent Labs   Lab Test 06/12/19  1810   PTHI 89*     Iron Studies  Recent Labs   Lab Test 06/21/23  1133 01/25/23  1536 06/22/22  1551   IRON 40 45 30*    228* 250   IRONSAT 16 20 12*   AVERY 86 410* 82       IMAGING:  All imaging studies reviewed by me.

## 2023-09-09 NOTE — PROGRESS NOTES
[facility]  Transplant Nephrology Consult  Date of Admission:  9/5/2023  Today's Date: 09/09/2023  Requesting physician: Kianna Valdez MD    Recommendations:  - reduced tacrolimus to 1.5 mg po bid, check trough Mon Sep11   - hold lisinopril and torsemide  - currently dexamethasone 6 mg po every day with recent COVID, once  complete resume prednisone 5 mg po every day    Assessment & Plan   # LDKT: uptrend now downtrending. Likely CNI.               - Baseline Creatinine: ~ 1.6-1.9 since Jan 2023 (previous Cr~1.3-1.6), and recent AMY to peak Cr-2.4 on Aug 30 in the setting of COVID, diarrhea, ...              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  Mild interstitial fibrosis and tubular atrophy.                                              Jul 18, 2005; Result: Mild acute cellular-mediated rejection.  Mild interstitial fibrosis and tubular atrophy.                                              Jun 24, 2005; Result: Mild acute cellular-mediated rejection.  Mild interstitial fibrosis and tubular atrophy.                                              May 04, 2005; Result: Mild acute cellular-mediated rejection.  Mild interstitial fibrosis and tubular atrophy.     # Immunosuppression Prior to Admission: Tacrolimus immediate release (goal 4-6) and Prednisone (dose 5 mg daily)              - Recently, patient was on sirolimus with goal level 4-6, but changed to tacrolimus in preparation for surgery in June.              - Present Immunosuppression: Tacrolimus immediate release (goal 4-6) and Dexamethasone (dose 6 mg daily)              - Patient is in an  immunosuppressed state and will continue to monitor for efficacy and toxicity of immunosuppression medications.              - Changes: Not at this time; After patient is done with dexamethasone, would restart prednisone 5 mg daily.     # Infection Prophylaxis:                   Last CD4 Level: 225 (Dec/2021)  - PJP: None     # Hypertension: Borderline control;   Goal BP: < 140/90 (Hospitalization goal)              - Volume status: Euvolemic              - Changes: Not at this time; Would continue in metoprolol and hydralazine, but hold lisinopril for AMY.     # Anemia in Chronic Renal Disease: Hgb: trend down     JONATHAN: No              - Iron studies: Low iron saturation     # Mineral Bone Disorder:   - Vitamin D; level: Not checked recently        On supplement: Yes  - Calcium; level: Normal        On supplement: Yes     # Electrolytes:   - Potassium; level: Normal        On supplement: No  - Magnesium; level: Normal        On supplement: Yes  - Bicarbonate; level: Low (monitor)     On supplement: No     # COVID Infection: Patient with new onset symptoms, including respiratory and mild GI symptoms.  Okay oxygenation.  No overt pneumonia, at least yet, on CXR.  Now with diarrhea, likely related to COVID.  Started on remdesivir and dexamethasone.  Trend down in CRP inflammation.     # HFrEF: Last cardiac echo (Aug/2023) showed borderline LVEF ~ 50-55% with thinning and akinesis of the mid anteroseptal segments.  Also noted to have moderate pericardial effusion.     # CAD, s/p PCI: No anginal symptoms at present.     # Pericardial Effusion: Patient with moderate pericardial effusion on most recent cardiac echo.  No evidence of tamponade physiology Cardiology recommended just following. Repeat echo     # PAD/AAA: Now recently with recent repair of right femoral artery pseudoaneurysm following previous infrarenal AAA, s/p EVAR with fem-fem bypass.  Followed by Vascular Surgery.     # COPD: some shortness of breath,  recent COVID.On inhalers.     # Lung Cancer: Patient with recent CT chest Aug/2023 increased size of right upper lobe nodularity adjusting evolving postradiation changed, but recommended continued close follow up.  Felt to be intermediate risk.  Followed by Pulmonary.     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  Could have higher EBV viremia in the setting of acute illness, but would not be treated.              - Recommend checking EBV PCR in ~ 4-6 weeks following this acute illness.     # Transplant History:  Etiology of Kidney Failure: Alport's syndrome  Tx: LDKT  Transplant: 9/20/2004 (Kidney)  Significant changes in immunosuppression:  Decreased immunosuppression due to recurrent cancers.  Significant transplant-related complications: EBV Viremia and Recurrent Skin Cancers    Patient seen and dicussed with Dr. Pili Obrien, NP  Pager: [phone]        Physician Attestation     I saw and evaluated Maribel Yang as part of a shared APRN/PA visit.     I personally reviewed the vital signs, medications, labs, and imaging.    I personally performed the substantive portion of the medical decision making for this visit - please see the DAYANA's documentation for full details.    Key management decisions made by me and carried out under my direction: persistent shortness of breath worse at night. BP labile, goal <140/90 and would reduce hydralazine dose if SBP persistently<120. Stable renal function. Restart prednisone 5 once off dexamethasone. FK trough tomorrow    Flaca Alejandre MD  Date of Service (when I saw the patient): 09/09/23    Interval Hx:  Ms. Yang's creatinine is 1.91 (09/09 0829); Trend down.  good urine output.  Other significant labs/tests/vitals: VSS.  93% on RA  no events overnight.  No chest pain or shortness of breath.  no leg swelling.  No nausea and vomiting.  Bowel movements are no.  no fever, sweats.  Some chills.     Review of Systems  The 10 point  "Review of Systems is negative other than noted in the HPI or here.       Allergies   Allergies   Allergen Reactions    Blood Transfusion Related (Informational Only) Other (See Comments)     Patient has a history of a clinically significant antibody against RBC antigens.  A delay in compatible RBCs may occur.    Tramadol-Acetaminophen Nausea and Vomiting and Hives    Hydrocodone Nausea and Vomiting and Hives     Prior to Admission Medications    aspirin  81 mg Oral Daily    atorvastatin  80 mg Oral Daily    calcium carbonate-vitamin D  1 tablet Oral BID    clopidogrel  75 mg Oral Daily    dexAMETHasone  6 mg Oral Daily    fluticasone  1 spray Both Nostrils Daily    heparin ANTICOAGULANT  5,000 Units Subcutaneous Q12H    hydrALAZINE  100 mg Oral Q8H APARNA    ipratropium-albuterol  1 puff Inhalation 4x daily    isosorbide mononitrate  60 mg Oral Daily    [Held by provider] lisinopril  2.5 mg Oral Daily    magnesium oxide  400 mg Oral BID    metoprolol tartrate  100 mg Oral Daily    multivitamin, therapeutic  1 tablet Oral Daily    pantoprazole  40 mg Oral QAM AC    sodium chloride (PF)  3 mL Intracatheter Q8H    tacrolimus  1.5 mg Oral BID    [Held by provider] torsemide  10 mg Oral Daily         Physical Exam   Temp  Av  F (36.7  C)  Min: 97.6  F (36.4  C)  Max: 98.2  F (36.8  C)      Pulse  Av.9  Min: 67  Max: 95 Resp  Av.9  Min: 16  Max: 30  SpO2  Av %  Min: 91 %  Max: 100 %     BP (!) 171/105 (BP Location: Left arm)   Pulse 76   Temp 98.7  F (37.1  C) (Oral)   Resp 18   Ht 1.549 m (5' 1\")   Wt 37.5 kg (82 lb 9.6 oz)   SpO2 95%   BMI 15.61 kg/m     Date 23 0700 - 23 0659   Shift 2614-0988 7741-6008 1011-8317 24 Hour Total   INTAKE   P.O. 800 360  1160   Shift Total(mL/kg) 800(21.4) 360(9.63)  1160(31.04)   OUTPUT   Shift Total(mL/kg)       Weight (kg) 37.38 37.38 37.38 37.38      Admit Weight: 37.6 kg (83 lb)     GENERAL APPEARANCE: alert and no distress  HENT: mouth without " ulcers or lesions  RESP: lungs clear to auscultation - no rales, rhonchi or wheezes  CV: regular rhythm, normal rate, no rub, no murmur  EDEMA: no LE edema bilaterally  ABDOMEN: soft, nondistended, nontender, bowel sounds normal  MS: extremities normal - no gross deformities noted, no evidence of inflammation in joints, no muscle tenderness  SKIN: no rash    Data   CMP  Recent Labs   Lab 09/08/23  0606 09/07/23  0626 09/06/23  0727 09/05/23  1850 09/05/23  1017    139 137  --  138   POTASSIUM 4.3 4.5 4.6  --  3.8   CHLORIDE 104 103 102  --  103   CO2 23 23 24  --  23   ANIONGAP 11 13 11  --  12   GLC 81 106* 115*  --  119*   BUN 57.4* 54.4* 47.9*  --  48.3*   CR 2.10* 1.91* 1.89*  --  1.74*   GFRESTIMATED 25* 28* 28*  --  31*   KAYKAY 8.8 9.3 9.3  --  8.8   MAG 1.4* 1.8 1.9 1.9 1.1*   PHOS 2.0* 2.7 3.6  --  1.8*   PROTTOTAL  --   --   --   --  6.1*   ALBUMIN  --   --   --   --  3.3*   BILITOTAL  --   --   --   --  0.3   ALKPHOS  --   --   --   --  108*   AST  --   --   --   --  18   ALT  --   --   --   --  <5     CBC  Recent Labs   Lab 09/08/23  0606 09/07/23  0626 09/06/23  0727 09/05/23  1017   HGB 10.2* 11.2* 10.8* 11.0*   WBC 8.0 7.6 5.8 8.5   RBC 3.83 4.21 4.12 4.14   HCT 31.7* 35.7 34.4* 34.3*   MCV 83 85 84 83   MCH 26.6 26.6 26.2* 26.6   MCHC 32.2 31.4* 31.4* 32.1   RDW 17.2* 17.2* 17.1* 17.0*    211 171 155     INRNo lab results found in last 7 days.  ABGNo lab results found in last 7 days.   Urine Studies  Recent Labs   Lab Test 08/31/23  0447 03/02/23  1400 06/23/21  0505 06/13/21  1819   COLOR Light Yellow Light Yellow Straw Light Yellow   APPEARANCE Clear Clear Clear Clear   URINEGLC Negative Negative Negative Negative   URINEBILI Negative Negative Negative Negative   URINEKETONE Negative Negative Negative Negative   SG 1.013 1.011 1.010 1.011   UBLD Trace* Negative Negative Negative   URINEPH 6.0 5.5 6.5 5.5   PROTEIN 100* 20* Negative 70*   NITRITE Negative Negative Negative Negative    LEUKEST Negative Negative Negative Negative   RBCU 3* 2  --  1   WBCU <1 1  --  3     Recent Labs   Lab Test 05/03/22  1459 04/24/18  1812 01/31/17  1030 06/03/16  0900 10/22/15  1024 06/25/15  1007   UTPG 2.58* 1.04* 1.67* 0.76* 0.44* 0.31*     PTH  Recent Labs   Lab Test 06/12/19  1810   PTHI 89*     Iron Studies  Recent Labs   Lab Test 06/21/23  1133 01/25/23  1536 06/22/22  1551   IRON 40 45 30*    228* 250   IRONSAT 16 20 12*   AVERY 86 410* 82       IMAGING:  All imaging studies reviewed by me.

## 2023-09-09 NOTE — PLAN OF CARE
Goal Outcome Evaluation: Ongoing, progressing    Plan of Care Reviewed With: patient    Overall Patient Progress: no change    Outcome Evaluation: No significant changes this shift. Pt is alert and oriented and able to make needs known. Continue to monitor for changes.

## 2023-09-09 NOTE — PROGRESS NOTES
[facility]    History and Physical - Medicine Service, MAROON TEAM 1       Date of Admission:  9/5/2023    Assessment & Plan      Maribel Yang is a 72 year old female admitted on 9/5/2023. She has a history of previous hypoxia in the setting of recent COVID-19 infection (08/30/23) treated with 4 day course of Remdesivir and Dexamethasone, pericardial effusion measured at 1,6cm w/o evidence of tamponade, Chronic heart failure with recovered ejection fraction, COPD, and kidney transplant admitted on 09/05/2023 to the ED for weakness and shortness of breath at rest.     Changes Today  - Cont dexamethasone 6mg daily  - Planned to discharge today, but pt w/ new chills and brief hypoxia  - CXR      # Confirmed COVID-19 infection    # Acute Hypoxic Respiratory Failure secondary to COVID-19 infection  Pt with confirmed COVID-19 infection (08/30/23) treated with 3 day course of dexamethasone and remdesivir, dyspnea at rest, and non-productive cough on admission. Has history of pericardial effusion, however admission POCUS without signs of tamponade. Had wheezing on admission but no increased cough or sputum production to suggest COPD exacerbation. CXR on admit w/o acute airspace disease. Given the subacute ongoing presentation of dyspnea, nonproductive cough along with physical exam findings the most likely etiology is residual COVID-19 symptoms d/t 10 day isolation period not being complete.   - Cont dexamethasone 6mg daily. Plan to discontinue on discharge and resume home prednisone  - Continue Combivent Respimat inhaler given COVID infection  - Heparin for AC  - Bcx 9/6 NGTD  - All extremities US given rise in D-dimer (likely in s/o COVID) w/o DVTs. Did show groin hematoma (decreased in size from prior imaging) and seemingly new possible hematoma vs infection vs seroma in R groin, largest dimension 1.9cm. Per patient this has been there for some time, unchanged in size,  not bothersome to her  - CXR given more chills and hypoxia on 9/9      #Weakness  #Hypomagnesemia and hypophosphatemia  Patient presented with muscle weakness. Mg 1.1 on admission, muscle weakness rapidly improved after Mg administration. Had been having diarrhea a few days PTA along with poor PO intake. Suspect muscle weakness in the setting of hypomagnesemia that improved with administration of 400 mg Magnesium Oxide  - Replete Mg and Phos PRN due to severe malnutrition in the context of chronic illness per nutrition consult  - Ensure Enlive BID daily, and coordinate with social work to check insurance coverage for oral nutrition supplements per nutrition consult  - OT/PT follow up given concerns about returning home and ability to complete ADLs. Per pt she is not interested in having home PT on discharge  - Monitor Magnesium and phosphorus levels daily   - Cont Mg oxide 400mg BID (increased from PTA of 400mg daily)  - Cont MVI        # Chronic heart failure with preserved ejection fraction: heart failure noted on problem list and last echo with EF >50%     #HTN  #Pericardial Effusion  Patient with history of recent pericardial effusion with minimal expansion 08/30, orthopnea, and Dyspnea at rest, bilateral lower extremity edema, regular rate and rhythm without chest pain. BNP elevated 4,557 (previously 1871)  -Repeat limited echocardiogram unremarkable (09/05), continue to monitor clinically for changes regarding prior pericardial effusion, although less c/f tamponade given not hypotensive, no distant heart sounds  -Blood pressure goal during hospitalization of 140/90, managing with metoprolol @100 mg daily hydralazine (increased from 50->100 mg). 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 Baseline Cr 1.8-2.0. On admission, Cr elevated to 2.42. BUN 47.7, normal potassium. Likely secondary to prerenal issues with GI symptoms and poor oral intake with COVID. No acute indications for dialysis.   - Transplant Nephrology Consult, appreciate recs  - Tacrolimus elevated at 8.3 (09/07) with goal of  4-6, continue immediate release tacrolimus and restart Prednisone @ 5 mg daily after dexamethasone course finished per nephrology recommendations   - Monitor BUN and creatinine daily CMP  - Transplant nephrology will coordinate outpt f/up with her in ~4 weeks after discharge      # Cachexia: Estimated body mass index is 15.68 kg/m  as calculated from the following:    Height as of this encounter: 1.549 m (5' 1\").    Weight as of this encounter: 37.6 kg (83 lb).              Diet: Combination Diet Regular Diet Adult  Snacks/Supplements Adult: Ensure Enlive; With Meals    DVT Prophylaxis: Heparin SQ  Villavicencio Catheter: Not present  Fluids: None  Lines: None     Cardiac Monitoring: None  Code Status: Full Code         The patient's care was discussed with the attending Dr. José Miguel Lowe, PGY2  ______________________________________________________________________      Interval History  NAEO. Remained on RA overnight. This morning she felt well, said she was hoping to go home today. She states she is not worried about stairs at home and is not interested in having home PT, she feels safe going home as is    Later in the day she suddenly developed chills and headache, and had hypoxia down to 85%. Was not moving around, eating, coughing at the time. She felt her breathing was a little worse and was now less comfortable going home. Denying fevers, abdominal pains, cough, diarrhea        Vital Signs: Temp: 98  F (36.7  C) Temp src: Oral BP: 112/63 Pulse: 80   Resp: 18 SpO2: 95 % O2 Device: Nasal cannula Oxygen Delivery: 3 LPM  Weight: 82 lbs 9.6 " oz    Physical Exam  GEN: alert, appears slightly uncomfortable, sitting in bed, cachectic  HEENT: EOMI,  anicteric sclera  CV: RRR, normal S1 S2, no m/g/r  RESP: lungs clear to auscultation - no rales, rhonchi or wheezes. On 2L, weaned down to RA satting at 92-94%  ABDOMEN:  soft, nontender, nondistended, +BS  MSK: WWP. R ankle with some edema compared to L edema  SKIN: no suspicious rashes on exposed skin  NEURO: Alert and oriented, moving all limbs spontaneously, mentation intact and speech normal  PSYCH: Appropriate mood and affect to match        Data     I have personally reviewed the following data over the past 24 hrs:    N/A  \   N/A   / N/A     138 104 48.1 (H) /  86   4.3 21 (L) 1.91 (H) \

## 2023-09-10 NOTE — PROGRESS NOTES
[facility]  Transplant Nephrology Consult  Date of Admission:  9/5/2023  Today's Date: 09/10/2023  Requesting physician: Kianna Valdez MD    Recommendations:  - reduced tacrolimus to 1.5 mg po bid, check trough Mon Sep11   - hold lisinopril and torsemide  - currently dexamethasone 6 mg po every day with recent COVID, once  complete resume prednisone 5 mg po every day  -if symptoms of COVID worsen can consider adjusting immunosuppression.    -BP likely elevated in setting of pain    Assessment & Plan   # LDKT: uptrend now downtrending. Likely CNI.               - Baseline Creatinine: ~ 1.6-1.9 since Jan 2023 (previous Cr~1.3-1.6), and recent AMY to peak Cr-2.4 on Aug 30 in the setting of COVID, diarrhea, ...              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  Mild interstitial fibrosis and tubular atrophy.                                              Jul 18, 2005; Result: Mild acute cellular-mediated rejection.  Mild interstitial fibrosis and tubular atrophy.                                              Jun 24, 2005; Result: Mild acute cellular-mediated rejection.  Mild interstitial fibrosis and tubular atrophy.                                              May 04, 2005; Result: Mild acute cellular-mediated rejection.  Mild interstitial fibrosis and tubular atrophy.     # Immunosuppression Prior to Admission: Tacrolimus immediate release (goal 4-6) and Prednisone (dose 5 mg daily)              - Recently, patient was on sirolimus with goal level 4-6, but changed to tacrolimus in preparation for surgery in June.              - Present  Immunosuppression: Tacrolimus immediate release (goal 4-6) and Dexamethasone (dose 6 mg daily)              - Patient is in an immunosuppressed state and will continue to monitor for efficacy and toxicity of immunosuppression medications.              - Changes: Not at this time; After patient is done with dexamethasone, would restart prednisone 5 mg daily.     # Infection Prophylaxis:                   Last CD4 Level: 225 (Dec/2021)  - PJP: None     # Hypertension: Borderline control;   Goal BP: < 140/90 (Hospitalization goal)              - Volume status: Euvolemic              - Changes: Not at this time; Would continue in metoprolol and hydralazine, but hold lisinopril for AMY.     # Anemia in Chronic Renal Disease: Hgb: trend down     JONATHAN: No              - Iron studies: Low iron saturation     # Mineral Bone Disorder:   - Vitamin D; level: Not checked recently        On supplement: Yes  - Calcium; level: Normal        On supplement: Yes     # Electrolytes:   - Potassium; level: Normal        On supplement: No  - Magnesium; level: Normal        On supplement: Yes  - Bicarbonate; level: Low (monitor)     On supplement: No     # COVID Infection: Patient with new onset symptoms, including respiratory and mild GI symptoms.  Okay oxygenation.  No overt pneumonia, at least yet, on CXR.  Now with diarrhea, likely related to COVID.  Started on remdesivir and dexamethasone.  Trend down in CRP inflammation.     # HFrEF: Last cardiac echo (Aug/2023) showed borderline LVEF ~ 50-55% with thinning and akinesis of the mid anteroseptal segments.  Also noted to have moderate pericardial effusion.     # CAD, s/p PCI: No anginal symptoms at present.     # Pericardial Effusion: Patient with moderate pericardial effusion on most recent cardiac echo.  No evidence of tamponade physiology Cardiology recommended just following. Repeat echo     # PAD/AAA: Now recently with recent repair of right femoral artery pseudoaneurysm following  previous infrarenal AAA, s/p EVAR with fem-fem bypass.  Followed by Vascular Surgery.     # COPD: some shortness of breath, recent COVID.On inhalers.     # Lung Cancer: Patient with recent CT chest Aug/2023 increased size of right upper lobe nodularity adjusting evolving postradiation changed, but recommended continued close follow up.  Felt to be intermediate risk.  Followed by Pulmonary.     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  Could have higher EBV viremia in the setting of acute illness, but would not be treated.              - Recommend checking EBV PCR in ~ 4-6 weeks following this acute illness.     # Transplant History:  Etiology of Kidney Failure: Alport's syndrome  Tx: LDKT  Transplant: 9/20/2004 (Kidney)  Significant changes in immunosuppression:  Decreased immunosuppression due to recurrent cancers.  Significant transplant-related complications: EBV Viremia and Recurrent Skin Cancers    Patient seen and dicussed with Dr. Pili Obrien NP  Pager: [phone]        Physician Attestation     I saw and evaluated Maribel Yang as part of a shared APRN/PA visit.     I personally reviewed the vital signs, medications, labs, and imaging.    I personally performed the substantive portion of the medical decision making for this visit - please see the DAYANA's documentation for full details.    Key management decisions made by me and carried out under my direction: worsening shortness of breath now requiring 2 L O2, afebrile, CXR not suggestive of superimposed infection. Will consider further IS reduction if clinical worsening, FK goal ~4    Flaca Alejandre MD  Date of Service (when I saw the patient): 09/10/23      Interval Hx:  Ms. Yang's creatinine is 2.09 (09/10 0650); Stable.  Good urine output.  Other significant labs/tests/vitals: VSS  No events overnight.  No chest pain or shortness of breath.  no leg swelling.  no nausea and vomiting.  Bowel movements are  "soft.  no fever, sweats or chills.   Has a headache.    Review of Systems  The 10 point Review of Systems is negative other than noted in the HPI or here.       Allergies   Allergies   Allergen Reactions    Blood Transfusion Related (Informational Only) Other (See Comments)     Patient has a history of a clinically significant antibody against RBC antigens.  A delay in compatible RBCs may occur.    Tramadol-Acetaminophen Nausea and Vomiting and Hives    Hydrocodone Nausea and Vomiting and Hives     Prior to Admission Medications    aspirin  81 mg Oral Daily    atorvastatin  80 mg Oral Daily    calcium carbonate-vitamin D  1 tablet Oral BID    clopidogrel  75 mg Oral Daily    dexAMETHasone  6 mg Oral Daily    fluticasone  1 spray Both Nostrils Daily    heparin ANTICOAGULANT  5,000 Units Subcutaneous Q12H    hydrALAZINE  100 mg Oral Q8H APARNA    ipratropium-albuterol  1 puff Inhalation 4x daily    isosorbide mononitrate  60 mg Oral Daily    [Held by provider] lisinopril  2.5 mg Oral Daily    magnesium oxide  400 mg Oral BID    metoprolol tartrate  100 mg Oral Daily    multivitamin, therapeutic  1 tablet Oral Daily    pantoprazole  40 mg Oral QAM AC    sodium chloride (PF)  3 mL Intracatheter Q8H    tacrolimus  1.5 mg Oral BID    [Held by provider] torsemide  10 mg Oral Daily         Physical Exam   Temp  Av  F (36.7  C)  Min: 97.6  F (36.4  C)  Max: 98.2  F (36.8  C)      Pulse  Av.9  Min: 67  Max: 95 Resp  Av.9  Min: 16  Max: 30  SpO2  Av %  Min: 91 %  Max: 100 %     BP (!) 161/101 (BP Location: Right arm)   Pulse 78   Temp 99.5  F (37.5  C) (Oral)   Resp 18   Ht 1.549 m (5' 1\")   Wt 37.1 kg (81 lb 14.4 oz)   SpO2 95%   BMI 15.47 kg/m     Date 23 0700 - 23 0659   Shift 0778-0863 4001-9283 6997-0829 24 Hour Total   INTAKE   P.O. 800 360  1160   Shift Total(mL/kg) 800(21.4) 360(9.63)  1160(31.04)   OUTPUT   Shift Total(mL/kg)       Weight (kg) 37.38 37.38 37.38 37.38      Admit " Weight: 37.6 kg (83 lb)     GENERAL APPEARANCE: alert and no distress  HENT: mouth without ulcers or lesions  RESP: lungs clear to auscultation - no rales, rhonchi or wheezes  CV: regular rhythm, normal rate, no rub, no murmur  EDEMA: no LE edema bilaterally  ABDOMEN: soft, nondistended, nontender, bowel sounds normal  MS: extremities normal - no gross deformities noted, no evidence of inflammation in joints, no muscle tenderness  SKIN: no rash    Data   CMP  Recent Labs   Lab 09/10/23  0650 09/09/23  0829 09/08/23  0606 09/07/23  0626 09/05/23  1850 09/05/23  1017    138 138 139   < > 138   POTASSIUM 4.9 4.3 4.3 4.5   < > 3.8   CHLORIDE 102 104 104 103   < > 103   CO2 21* 21* 23 23   < > 23   ANIONGAP 13 13 11 13   < > 12   GLC 92 86 81 106*   < > 119*   BUN 46.5* 48.1* 57.4* 54.4*   < > 48.3*   CR 2.09* 1.91* 2.10* 1.91*   < > 1.74*   GFRESTIMATED 25* 28* 25* 28*   < > 31*   KAYKAY 9.3 9.4 8.8 9.3   < > 8.8   MAG 1.7 1.7 1.4* 1.8   < > 1.1*   PHOS 3.4 2.1* 2.0* 2.7   < > 1.8*   PROTTOTAL  --   --   --   --   --  6.1*   ALBUMIN  --   --   --   --   --  3.3*   BILITOTAL  --   --   --   --   --  0.3   ALKPHOS  --   --   --   --   --  108*   AST  --   --   --   --   --  18   ALT  --   --   --   --   --  <5    < > = values in this interval not displayed.     CBC  Recent Labs   Lab 09/10/23  0650 09/08/23  0606 09/07/23  0626 09/06/23  0727   HGB 10.8* 10.2* 11.2* 10.8*   WBC 9.5 8.0 7.6 5.8   RBC 4.13 3.83 4.21 4.12   HCT 34.3* 31.7* 35.7 34.4*   MCV 83 83 85 84   MCH 26.2* 26.6 26.6 26.2*   MCHC 31.5 32.2 31.4* 31.4*   RDW 17.5* 17.2* 17.2* 17.1*    182 211 171     INRNo lab results found in last 7 days.  ABGNo lab results found in last 7 days.   Urine Studies  Recent Labs   Lab Test 08/31/23  0447 03/02/23  1400 06/23/21  0505 06/13/21  1819   COLOR Light Yellow Light Yellow Straw Light Yellow   APPEARANCE Clear Clear Clear Clear   URINEGLC Negative Negative Negative Negative   URINEBILI Negative Negative  Negative Negative   URINEKETONE Negative Negative Negative Negative   SG 1.013 1.011 1.010 1.011   UBLD Trace* Negative Negative Negative   URINEPH 6.0 5.5 6.5 5.5   PROTEIN 100* 20* Negative 70*   NITRITE Negative Negative Negative Negative   LEUKEST Negative Negative Negative Negative   RBCU 3* 2  --  1   WBCU <1 1  --  3     Recent Labs   Lab Test 05/03/22  1459 04/24/18  1812 01/31/17  1030 06/03/16  0900 10/22/15  1024 06/25/15  1007   UTPG 2.58* 1.04* 1.67* 0.76* 0.44* 0.31*     PTH  Recent Labs   Lab Test 06/12/19  1810   PTHI 89*     Iron Studies  Recent Labs   Lab Test 06/21/23  1133 01/25/23  1536 06/22/22  1551   IRON 40 45 30*    228* 250   IRONSAT 16 20 12*   AVERY 86 410* 82       IMAGING:  All imaging studies reviewed by me.

## 2023-09-10 NOTE — DISCHARGE SUMMARY
"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    Weight as of this encounter: 37.1 kg (81 lb 14.4 oz).  # Severe Malnutrition: based on nutrition assessment      Follow-ups Needed After Discharge   Follow-up Appointments     Follow Up ([facility])      Follow up with transplant nephrology within 1 week for repeat labs.    Appointments on [place] and/or [facility] (with [facility] or [facility]   provider or service). Call [phone] if you haven't heard regarding   these appointments within 7 days of discharge.            Unresulted Labs Ordered in the Past 30 Days of this Admission       Date and Time Order Name Status Description    9/6/2023 12:41 PM Blood Culture Arm, Left Preliminary     9/6/2023 12:41 PM Blood Culture Arm, Right Preliminary         These results will be followed up by Hospitalist pool.    Discharge Disposition   Discharged to home  Condition at discharge: Stable    Hospital Course   Maribel Yang is a 72 year old female admitted on 9/5/2023. She has a history of previous hypoxia in the setting of recent COVID-19 infection (08/30/23) treated with 4 day course of Remdesivir and Dexamethasone, pericardial effusion measured at 1,6cm w/o evidence of tamponade, Chronic heart failure with recovered ejection fraction, COPD, and kidney transplant admitted on 09/05/2023 to the ED " for weakness and shortness of breath at rest.      # Confirmed COVID-19 infection    # Acute Hypoxic Respiratory Failure secondary to COVID-19 infection  Pt with confirmed COVID-19 infection (08/30/23) treated with 3 day course of dexamethasone and remdesivir, dyspnea at rest, and non-productive cough on admission. Has history of pericardial effusion, however admission POCUS without signs of tamponade. Had wheezing on admission but no increased cough or sputum production to suggest COPD exacerbation. CXR on admit w/o acute airspace disease. Given the subacute ongoing presentation of dyspnea, nonproductive cough along with physical exam findings the most likely etiology is residual COVID-19 symptoms. All extremities US given rise in D-dimer (likely in s/o COVID) w/o DVTs. Broad infectious work up negative. She was restarted on dexamethasone 9/5-9/10 while admitted. Her respiratory status improved throughout hospitalization and she was stable on RA.   - Continue pta albuterol      #Weakness  #Hypomagnesemia and hypophosphatemia  Patient presented with muscle weakness. Mg 1.1 on admission, muscle weakness rapidly improved after Mg administration. Had been having diarrhea a few days PTA along with poor PO intake. Suspect muscle weakness in the setting of hypomagnesemia that improved with administration of 400 mg Magnesium Oxide  - Ensure Enlive BID daily, and coordinate with social work to check insurance coverage for oral nutrition supplements per nutrition consult  - OT/PT follow up given concerns about returning home and ability to complete ADLs. Per pt she is not interested in having home PT on discharge  - Cont Mg oxide 400mg BID (increased from PTA of 400mg daily)  - Cont MVI     # Chronic heart failure with preserved ejection fraction: heart failure noted on problem list and last echo with EF >50%     #HTN  #Pericardial Effusion  Patient with history of recent pericardial effusion with minimal expansion 08/30,  "orthopnea, and Dyspnea at rest, bilateral lower extremity edema, regular rate and rhythm without chest pain. BNP elevated 4,557 (previously 1871). Repeat limited echocardiogram unremarkable (09/05), continue to monitor clinically for changes regarding prior pericardial effusion, although less c/f tamponade given not hypotensive, no distant heart sounds.  -Blood pressure goal during hospitalization of 140/90, managing with metoprolol @100 mg daily hydralazine q8 (increased from 50->100 mg). Suspect that when she comes off dexamethasone her BP will improve.  -Held lisinopril and torsemide due to elevated creatinine 09/08/23, hold until transplant nephrology. Continue increased hydralazine 100mg q8 until transplant follow up.     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 Baseline Cr 1.8-2.0. On admission, Cr elevated to 2.42. BUN 47.7, normal potassium. Likely secondary to prerenal issues with GI symptoms and poor oral intake with COVID. No acute indications for dialysis.   - Tacrolimus elevated at 8.3 (09/07) with goal of  4-6, continue immediate release tacrolimus and restart Prednisone @ 5 mg daily after dexamethasone course finished per nephrology recommendations   - Transplant nephrology will coordinate outpt f/up with her in ~4 weeks after discharge     # Cachexia: Estimated body mass index is 15.68 kg/m  as calculated from the following:    Height as of this encounter: 1.549 m (5' 1\").    Weight as of this encounter: 37.6 kg (83 lb).           Consultations This Hospital Stay   NEPHROLOGY KIDNEY/PANCREAS TRANSPLANT ADULT IP CONSULT  PHYSICAL THERAPY ADULT IP CONSULT  OCCUPATIONAL THERAPY ADULT IP CONSULT  NUTRITION SERVICES ADULT IP CONSULT  CARE MANAGEMENT / SOCIAL WORK IP CONSULT  NURSING TO CONSULT FOR VASCULAR ACCESS CARE IP CONSULT  NEPHROLOGY KIDNEY/PANCREAS TRANSPLANT ADULT IP CONSULT  CARE " MANAGEMENT / SOCIAL WORK IP CONSULT    Code Status   Full Code    Time Spent on this Encounter   I, Kianna Valdez MD, personally saw the patient today and spent greater than 30 minutes discharging this patient.     Kianna Valdez MD  [facility] 7C MED SURG  [address]  [place] [address]  Phone: [phone]  ______________________________________________________________________    Physical Exam   Vital Signs: Temp: 99.5  F (37.5  C) Temp src: Oral BP: (!) 161/101 Pulse: 78   Resp: 18 SpO2: 95 % O2 Device: Nasal cannula Oxygen Delivery: 2 LPM  Weight: 81 lbs 14.4 oz  General Appearance: Alert, pleasant, NAD.  Respiratory: Breathing comfortably on room air.   Cardiovascular: RRR. Normal S1/S2.   GI: Soft, non-distended, non-tender.   Skin: No rash or lesions.   Other: Alert and oriented x3. CNII-XII grossly intact.        Primary Care Physician   Lisa Martinez    Discharge Orders      Reason for your hospital stay    You were admitted to the hospital for weakness and shortness of breath due to COVID and electrolyte abnormalities. You improved with steroids and treating electrolytes.     Activity    Your activity upon discharge: activity as tolerated     Follow Up ([facility])    Follow up with transplant nephrology within 1 week for repeat labs.    Appointments on [place] and/or [facility] (with [facility] or [facility] provider or service). Call [phone] if you haven't heard regarding these appointments within 7 days of discharge.     Diet    Follow this diet upon discharge: Orders Placed This Encounter      Snacks/Supplements Adult: Ensure Enlive; With Meals      Combination Diet Regular Diet Adult       Significant Results and Procedures   Most Recent 3 CBC's:  Recent Labs   Lab Test 09/10/23  0650 09/08/23  0606 09/07/23  0626   WBC 9.5 8.0 7.6   HGB 10.8* 10.2* 11.2*   MCV 83 83 85    182 211     Most Recent 3 BMP's:  Recent Labs   Lab Test 09/10/23  0650 09/09/23  0829 09/08/23  0606     138 138   POTASSIUM 4.9 4.3 4.3   CHLORIDE 102 104 104   CO2 21* 21* 23   BUN 46.5* 48.1* 57.4*   CR 2.09* 1.91* 2.10*   ANIONGAP 13 13 11   KAYKAY 9.3 9.4 8.8   GLC 92 86 81       Discharge Medications   Current Discharge Medication List        START taking these medications    Details   !! Nutritional Supplements (BOOST VERY HIGH CALORIE) LIQD Take 8 oz by mouth 2 times daily  Qty: 960 mL, Refills: 0    Associated Diagnoses: Severe protein-calorie malnutrition (H)       !! - Potential duplicate medications found. Please discuss with provider.        CONTINUE these medications which have CHANGED    Details   hydrALAZINE (APRESOLINE) 100 MG tablet Take 1 tablet (100 mg) by mouth every 8 hours  Qty: 90 tablet, Refills: 0    Associated Diagnoses: COVID-19 virus infection      magnesium oxide (MAG-OX) 400 MG tablet Take 1 tablet (400 mg) by mouth 2 times daily  Qty: 60 tablet, Refills: 0    Associated Diagnoses: Iron deficiency anemia, unspecified iron deficiency anemia type           CONTINUE these medications which have NOT CHANGED    Details   acetaminophen (TYLENOL) 325 MG tablet Take 2 tablets (650 mg) by mouth every 4 hours as needed for other (For optimal non-opioid multimodal pain management to improve pain control.)  Qty: 100 tablet, Refills: 3    Associated Diagnoses: Abdominal aortic aneurysm (AAA) without rupture (H); ST elevation myocardial infarction (STEMI), unspecified artery (H); Peripheral artery disease (H)      albuterol (PROAIR HFA/PROVENTIL HFA/VENTOLIN HFA) 108 (90 Base) MCG/ACT inhaler Inhale 1-2 puffs into the lungs every 6 hours as needed for shortness of breath, wheezing or cough  Qty: 18 g, Refills: 4    Comments: Pharmacy may dispense brand covered by insurance (Proair, or proventil or ventolin or generic albuterol inhaler)  Associated Diagnoses: Mild intermittent asthma without complication      ASPIRIN LOW DOSE 81 MG chewable tablet CHEW AND SWALLOW 1 TABLET (81 MG) BY MOUTH  DAILY  Qty: 90 tablet, Refills: 0    Associated Diagnoses: Kidney replaced by transplant; Abdominal aortic aneurysm (AAA) without rupture (H); ST elevation myocardial infarction (STEMI), unspecified artery (H); Peripheral artery disease (H)      atorvastatin (LIPITOR) 80 MG tablet Take 1 tablet (80 mg) by mouth daily  Qty: 90 tablet, Refills: 3    Associated Diagnoses: Abdominal aortic aneurysm (AAA) without rupture (H); ST elevation myocardial infarction (STEMI), unspecified artery (H); Peripheral artery disease (H)      calcium carbonate-vitamin D (CALTRATE) 600-10 MG-MCG per tablet TAKE ONE TABLET BY MOUTH TWICE A DAY  Qty: 120 tablet, Refills: 0    Comments: Due for annual wellness exam  Associated Diagnoses: Age-related osteoporosis without current pathological fracture      clopidogrel (PLAVIX) 75 MG tablet Take 1 tablet (75 mg) by mouth daily  Qty: 90 tablet, Refills: 3    Associated Diagnoses: Coronary artery vasospasm (H)      esomeprazole (NEXIUM) 20 MG DR capsule Take 1 capsule (20 mg) by mouth every morning (before breakfast) Take 30-60 minutes before eating.  Qty: 30 capsule, Refills: 5    Associated Diagnoses: Globus syndrome      fluticasone (FLONASE) 50 MCG/ACT nasal spray Spray 1 spray into both nostrils daily  Qty: 18.2 mL, Refills: 3    Associated Diagnoses: Chronic rhinitis      isosorbide mononitrate (IMDUR) 60 MG 24 hr tablet Take 1 tablet (60 mg) by mouth daily  Qty: 90 tablet, Refills: 2    Associated Diagnoses: Coronary artery vasospasm (H); Abdominal aortic aneurysm (AAA) without rupture (H); ST elevation myocardial infarction involving right coronary artery (H); Wound infection after surgery      Lactobacillus-Inulin ([facility] DIGESTIVE HEALTH) CAPS TAKE ONE CAPSULE BY MOUTH ONCE DAILY (DUE FOR PHYSICAL IN MARCH)  Qty: 90 capsule, Refills: 3    Associated Diagnoses: Immunosuppression (H)      metoprolol tartrate (LOPRESSOR) 100 MG tablet Take 1 tablet (100 mg) by mouth daily  Qty: 90  tablet, Refills: 3    Associated Diagnoses: Congestive heart failure, unspecified HF chronicity, unspecified heart failure type (H)      !! Nutritional Supplements (ENSURE CLEAR) LIQD Take 1 Bottle by mouth daily  Qty: 396 mL, Refills: 11    Associated Diagnoses: Abdominal aortic aneurysm (AAA) without rupture (H); Congestive heart failure, unspecified HF chronicity, unspecified heart failure type (H); Immunosuppression (H); Protein-calorie malnutrition, unspecified severity (H)      oxyCODONE (ROXICODONE) 5 MG tablet Take 1 tablet (5 mg) by mouth every 4 hours as needed for severe pain  Qty: 12 tablet, Refills: 0    Associated Diagnoses: Pseudoaneurysm of femoral artery (H)      predniSONE (DELTASONE) 5 MG tablet Take 1 tablet (5 mg) by mouth daily  Qty: 90 tablet, Refills: 3    Associated Diagnoses: Kidney replaced by transplant; Abdominal aortic aneurysm (AAA) without rupture (H); ST elevation myocardial infarction (STEMI), unspecified artery (H); Peripheral artery disease (H)      psyllium (METAMUCIL/KONSYL) 58.6 % powder Take by mouth every morning      tacrolimus (GENERIC EQUIVALENT) 0.5 MG capsule Take 4 capsules (2 mg) by mouth 2 times daily  Qty: 240 capsule, Refills: 11    Associated Diagnoses: Kidney transplanted; Immunosuppressive management encounter following kidney transplant; Aftercare following organ transplant       !! - Potential duplicate medications found. Please discuss with provider.        STOP taking these medications       lisinopril (ZESTRIL) 2.5 MG tablet Comments:   Reason for Stopping:         torsemide (DEMADEX) 10 MG tablet Comments:   Reason for Stopping:             Allergies   Allergies   Allergen Reactions    Blood Transfusion Related (Informational Only) Other (See Comments)     Patient has a history of a clinically significant antibody against RBC antigens.  A delay in compatible RBCs may occur.    Tramadol-Acetaminophen Nausea and Vomiting and Hives    Hydrocodone Nausea and  Vomiting and Hives

## 2023-09-10 NOTE — PLAN OF CARE
Goal Outcome Evaluation:      Plan of Care Reviewed With: patient    Overall Patient Progress: no changeOverall Patient Progress: no change     A&Ox4, VSS on 2 L NC per pt request for comfort. Otherwise 95% with RA. Having some chills, very cold. No fevers. Very tearful. Stated she's having bounding headache. MD paged. Family present in room. Reg diet tolerated well. Independent in room. Stool softener given. 1 BM. Discharge home when medically ready. Continue plan of care.

## 2023-09-10 NOTE — PLAN OF CARE
"BP (!) 141/80   Pulse 80   Temp 97.3  F (36.3  C) (Oral)   Resp 24   Ht 1.549 m (5' 1\")   Wt 37.1 kg (81 lb 14.4 oz)   SpO2 97%   BMI 15.47 kg/m     Time: 1514-1464   Reason for admission: COVID-19  Activity: Independent.   Pain: C/o headache, received PRN Tylenol 650 mg x3, and it was effective.   Neuro: alert and oriented.   Cardiac: WDL  Resp: dyspnea on exertion, patient is on 2L of oxygen, lung sounds diminished.   GI/: Regular diet, voids without difficulties, bowel sounds present x four quadrants, C/o nausea, received PRN Zofran x1, and it was effective. .    Lines: PIV, saline locked.   Skin: WDL X ABD bruises.   Labs/Imaging: no lab or imaging this shift.   New changes to shift: no change.   Plan: continue with current plan of cares.                          "

## 2023-09-10 NOTE — PROGRESS NOTES
Care Management Discharge Note    Discharge Date: 09/10/2023     Discharge Disposition: Home with home care     Discharge Services: Meals on Wheels    Discharge DME: None    Discharge Transportation: family or friend will provide    Education Provided on the Discharge Plan: Yes    Patient/Family in Agreement with the Plan: yes    Handoff Referral Completed: Yes    Additional Information:  RNCC following pt for discharge today. Pt med ready and open to [facility]. Writer called [facility] P: [phone] and informed the on-call staff about discharge from hospital today. They will be starting care tomorrow.  Daughter to discharge pt home.    Hand-off completed.     Radha Alonzo RN, MSN  Casual RN Care Coordinator  [facility]  Phone: [phone]

## 2023-09-10 NOTE — PLAN OF CARE
Goal Outcome Evaluation:      Plan of Care Reviewed With: patient    Overall Patient Progress: no changeOverall Patient Progress: no change     A&Ox4, VSS on RA with O2 sats 95-95%. No fevers. No new changes.Independent in room. Discharged home with daughter at 3:30 pm. PIV removed. All medication picked up from pharmacy. Education provided. Wheeled down by daughter.

## 2023-09-11 NOTE — TELEPHONE ENCOUNTER
Triage,   Patient's  called.   States patient just got out of the hospital and is not doing well.   Tried to schedule a hospital follow up for Wednesday at 10AM w/ PN but  wants to talk to PN ASAP.   Offered appt w/ PN's colleagues today but  declined.  Did inform  PN is not in on Mondays.    Of note, put patient down for this Wednesday at 10AM in case for a virtual appt.   Patient cannot walk.    Please advise.   Thanks!  Julissa HENSLEY

## 2023-09-11 NOTE — PLAN OF CARE
Physical Therapy Discharge Summary    Reason for therapy discharge:    Discharged to home with home therapy.    Progress towards therapy goal(s). See goals on Care Plan in [facility] electronic health record for goal details.  Goals partially met.  Barriers to achieving goals:   discharge from facility.    Therapy recommendation(s):    Continued therapy is recommended.  Rationale/Recommendations:  to improve activity tolerance and safety with IND mobility.

## 2023-09-12 NOTE — TELEPHONE ENCOUNTER
Ted,  called back  Spoke with patient  Had taken stool softener x2 on 9/10/23  Was up to the bathroom all evening  Got some rest last evening  Is feeling okay  Speaking in long full sentences on the phone  Using inhaler 4x/day, improves breathing  /88 today  Will call back or go to ED if symptoms worsen  Has VV tomorrow with PCP  Radha OSBORN RN

## 2023-09-13 NOTE — PROGRESS NOTES
Maribel is a 72 year old who is being evaluated via a billable video visit.      How would you like to obtain your AVS? MyChart  If the video visit is dropped, the invitation should be resent by: Send to e-mail at: [email or web address]  Will anyone else be joining your video visit? No          Assessment & Plan     Infection due to 2019 novel coronavirus  Diagnosed with COVID 8/29/2023 - two recent hospitalizations for acute hypoxic respiratory failure requiring oxygen  Pt was given dexamethasone and Remdesivir   She continues to have difficulty with breathing but O2 sats were >90% on RA  She is very fatigued and tired and limits her ability to do anything - sleeping most of the day  Recent hospitalization she had CRP that was very elevated but started to trend down - will plan to recheck labs if home health nurse can go out to her home  Advised of warning signs and symptoms to RTC     She did spike a fever last night and concern that this could be related to COVID but of note she also had ultrasound findings of complex cyst in the right femoral area - concern if this could be infection?  If her fevers continue may need to evaluate further - advised to monitor for pain, swelling or redness in the area.    - CRP, inflammation; Future  - CBC with platelets; Future    Immunosuppressed status (H)  S/p kidney transplant and severely immunosupressed - very high risk   - Magnesium; Future  - Comprehensive metabolic panel (BMP + Alb, Alk Phos, ALT, AST, Total. Bili, TP); Future    Kidney replaced by transplant     - Magnesium; Future  - Comprehensive metabolic panel (BMP + Alb, Alk Phos, ALT, AST, Total. Bili, TP); Future    Hematoma   See above information about finding on ultrasound     Nausea  Persisting nausea since she left the hospital -   Will write for zofran so she is able to stay hydrated and eat - severely malnourished with BMI 14   - ondansetron (ZOFRAN ODT) 4 MG ODT tab; Take 1 tablet (4 mg) by mouth every 8  hours as needed for nausea    Acute deep vein thrombosis (DVT) of proximal vein of lower extremity, unspecified laterality (H)  Hx of DVT -   Elevated d diver in hospital but the workup with ultrasound was negative   Will continue anticoagulation    History of DVT (deep vein thrombosis)       Hypomagnesemia  On magnesium supplement   Needs labs redrawn - she is unable to leave her home and is completely homebound - did have home RN reach out and I encourage her to have visit to evaluate and draw blood - orders are in EPIC     Congestive heart failure, unspecified HF chronicity, unspecified heart failure type (H)       AMY (acute kidney injury) (H)   Creatinine with slight bump - was up to 2 recently and is s/p transplant remotely  Need to monitor closely      40 minutes spent by me on the date of the encounter doing chart review, review of outside records, review of test results, patient visit, and documentation      MED REC REQUIRED  Post Medication Reconciliation Status:  Discharge medications reconciled and changed, see notes/orders      Lisa Martinez DO  [facility] UPWEVELIN López is a 72 year old, presenting for the following health issues:  Hospital F/U        6/21/2023    10:11 AM   Additional Questions   Roomed by Lina HARO   Accompanied by n/a       HPI       Hospital Follow-up Visit:    Hospital/Nursing Home/IP Rehab Facility: [facility]  Date of Admission: 09/05/2023  Date of Discharge: 09/10/2023  Reason(s) for Admission: Hypomagnesemia     Was your hospitalization related to COVID-19? No   Problems taking medications regularly:  None  Medication changes since discharge: magnesuim   Problems adhering to non-medication therapy:  None    Having stomach - increase nausea but not vomiting  Also having some constipation at night   Stomach is very gurgling   Trouble eating  Chills   Trying to walk around a little -   Took some zofran in  "the hospital     Sleeping a lot    Drinking ensure and soup mostly       SOB - using inhaler and helps for a while -   Using albuterol - using 4 times a day    Last night had fever up to 102F       Summary of hospitalization:  [facility] discharge summary reviewed  Diagnostic Tests/Treatments reviewed.  Follow up needed: needs blood work   Other Healthcare Providers Involved in Patient s Care:         Homecare and Specialist appointment - shouild followup with transplant   Update since discharge: fluctuating course.         Plan of care communicated with patient             Review of Systems   Constitutional, HEENT, cardiovascular, pulmonary, gi and gu systems are negative, except as otherwise noted.      Objective    Vitals - Patient Reported  Height (Patient Reported): 154.9 cm (5' 1\")      Vitals:  No vitals were obtained today due to virtual visit.    Physical Exam   GENERAL: alert, no distress, frail, fatigued, and severely underweight  EYES: Eyes grossly normal to inspection.  No discharge or erythema, or obvious scleral/conjunctival abnormalities.  RESP: no audible breath sounds or cough but shorter sentences and more pauses   SKIN: Visible skin clear. No significant rash, abnormal pigmentation or lesions.  NEURO: Cranial nerves grossly intact.  Mentation and speech appropriate for age.                Video-Visit Details    Type of service:  Video Visit   Video Start Time:  10:15  Video End Time: 10'\"45    Originating Location (pt. Location): Home    Distant Location (provider location):  On-site  Platform used for Video Visit: Mikaela"

## 2023-09-16 NOTE — TELEPHONE ENCOUNTER
Pt is having diarrhea, recently hospitalized.  Noticed Blood when wiping herself on tissue, denies blood in stool  Pt has covid, diarrhea just started little ago    Triage to see HCP within 24 hours, care advice given.    Lynn Izaguirre, RN, BSN  9/16/2023 at 1:39 AM  [facility]        Reason for Disposition   Weak immune system (e.g., HIV positive, cancer chemo, splenectomy, organ transplant, chronic steroids)    Additional Information   Negative: Shock suspected (e.g., cold/pale/clammy skin, too weak to stand, low BP, rapid pulse)   Negative: Difficult to awaken or acting confused (e.g., disoriented, slurred speech)   Negative: Sounds like a life-threatening emergency to the triager   Negative: [1] SEVERE abdominal pain (e.g., excruciating) AND [2] present > 1 hour   Negative: [1] SEVERE abdominal pain AND [2] age > 60 years   Negative: [1] Blood in the stool AND [2] moderate or large amount of blood   Negative: Black or tarry bowel movements  (Exception: Chronic-unchanged black-grey BMs AND is taking iron pills or Pepto-Bismol.)   Negative: [1] Drinking very little AND [2] dehydration suspected (e.g., no urine > 12 hours, very dry mouth, very lightheaded)   Negative: Patient sounds very sick or weak to the triager   Negative: [1] SEVERE diarrhea (e.g., 7 or more times / day more than normal) AND [2] age > 60 years   Negative: [1] Constant abdominal pain AND [2] present > 2 hours   Negative: [1] Fever > 103 F (39.4 C) AND [2] not able to get the fever down using Fever Care Advice   Negative: [1] SEVERE diarrhea (e.g., 7 or more times / day more than normal) AND [2] present > 24 hours (1 day)   Negative: [1] MODERATE diarrhea (e.g., 4-6 times / day more than normal) AND [2] present > 48 hours (2 days)   Negative: [1] MODERATE diarrhea (e.g., 4-6 times / day more than normal) AND [2] age > 70 years   Negative: Fever > 101 F (38.3 C)   Negative: Fever present > 3 days (72 hours)   Negative: Abdominal pain   (Exception: Pain clears with each passage of diarrhea stool.)   Negative: [1] Blood in the stool AND [2] small amount of blood  (Exception: Only on toilet paper. Reason: Diarrhea can cause rectal irritation with blood on wiping.)   Negative: [1] Mucus or pus in stool AND [2] present > 2 days AND [3] diarrhea is more than mild   Negative: [1] Recent antibiotic therapy (i.e., within last 2 months) AND [2] diarrhea present > 3 days since antibiotic was stopped   Negative: [1] Recent hospitalization AND [2] diarrhea present > 3 days    Protocols used: Diarrhea-A-AH

## 2023-09-17 NOTE — TELEPHONE ENCOUNTER
Patient Returning Call    Reason for call:  Patient's  called, stated that Maribel talked to Dr. Martinez last week and would like to talk to her again.      Information relayed to patient:  Told message will be sent to provider and she/team will call her back.      Patient has additional questions:  Yes      Could we [facility] this information to you [facility] or would you prefer to receive a phone call?:   Patient would prefer a phone call   Okay to leave a detailed message?: Yes at Cell number on file:    Telephone Information:   Mobile [phone]

## 2023-09-18 NOTE — TELEPHONE ENCOUNTER
Triage,   Please see message below and advise.   Had visit w/ PN on 9/13/23.  Thanks!  Julissa HENSLEY

## 2023-09-18 NOTE — TELEPHONE ENCOUNTER
Left message for patient to call [facility] back  When patient calls back please transfer to an RN  Wilda ASHTON RN

## 2023-09-20 NOTE — TELEPHONE ENCOUNTER
Triage,  Taylor called.  Following up on message below.  Please call taylor back when you can.   His number is [phone].  Thanks!  Julissa HENSLEY

## 2023-09-23 PROBLEM — R10.13 ABDOMINAL PAIN, EPIGASTRIC: Status: ACTIVE | Noted: 2023-01-01

## 2023-09-23 PROBLEM — N17.9 AKI (ACUTE KIDNEY INJURY) (H): Status: ACTIVE | Noted: 2023-01-01

## 2023-09-23 NOTE — ED TRIAGE NOTES
"Patient comes in with abdominal pain for \"days.\" Pain is right in center of abdomen. States she can't sleep at night, she has to sit up. States constipation and diarrhea. States no blood in her stool but sees blood when she wipes. Denies vomiting but states nausea. Took 5 mg oxycodone at 1500. Hx kidney transplant 19 years ago.      Triage Assessment       Row Name 09/23/23 1546       Triage Assessment (Adult)    Airway WDL WDL       Respiratory WDL    Respiratory WDL WDL       Skin Circulation/Temperature WDL    Skin Circulation/Temperature WDL WDL       Cardiac WDL    Cardiac WDL WDL       Peripheral/Neurovascular WDL    Peripheral Neurovascular WDL WDL       Cognitive/Neuro/Behavioral WDL    Cognitive/Neuro/Behavioral WDL WDL                    "

## 2023-09-23 NOTE — ED PROVIDER NOTES
History     Chief Complaint:  Abdominal Pain       HPI   Maribel Yang is an anticoagulated 72 year old female with a history of congestive heart failure and CKD who presents with constant severe abdominal pain for days. She is unable to sleep at night due to her symptoms. She has not been able to eat for days. She can only eat a few bites due to the pain. She was told her creatine and other labs were abnormal this morning as well. Her pain is alleviated a little with her knees bent, but is constant.     Independent Historian:   None - Patient Only    Review of External Notes:   Recent labs and creatinine through primary care clinic.    Medications:    Albuterol inhaler  Aspirin chewable, 81 mg  Lipitor  Plavix  Nexium  Flonase  Imdur  Culturee digestive health caps  Lopressor  Zofran  Roxicodone  Deltasone  Metamucil/Konsyl  Tacrolimus    Past Medical History:    Abnormal coagulation profile  Age-related osteoporosis without current pathological fracture  Anemia  Antiplatelet or antithrombotic long-term use  Ascus with positive high risk HPV  Basal cell carcinoma  Congestive heart failure  COPD  Depressive disorder  Heart attack  History of blood transfusion  Hypertension  Immunosuppressed status  Kidney replaced by transplant  LSIL (low grade squamous intraepithelial lesion) on pap smear  PAD (peripheral artery diease)  PONV (postoperative nausea and vomiting)  Squamous cell lung cancer  Thrombosis of leg  Unspecified disorder of kidney and ureter  Stage 3 chronic kidney diease  Peripheral artery diease  Transplant immunosuppression management  Squamous cell carcinoma of lung  Thrombocytopenia  Malignant neoplasm of anal canal  Chronic systolic heart failure  Coronary artery vasospasm  STEMI  Ganglion cyst of volar aspect of right wrist  Chronic diarrhea  Myopia  Chronic use of steroids    Past Surgical History:    Biopsy of kidney, lung, breast  Anal biopsy  Bypass graft femoral  Colonoscopy  Conization  "Leep  CV coronary angiogram  CV PCI stent drug eluting  Endovascular repair aneurysm aortoiliac  Esophagoscopy, gastroscopy, duodenoscopy, combined  Exam under anesthesia, anus  Eye surgery  Genitourinary surgery  IR or angiogram  Irrigation and debridement groin  Laser CO 2 Excise vulva wide local  Laser CO 2 Vagina  Microscopy anal  PICC double lumen placement   Pseudoaneurysm repair  Transesophageal echo echocardiogram intraoperative   Vascular surgery  Thrombectomy  Open kidney biopsy  Kidney donation recipient       Physical Exam   Patient Vitals for the past 24 hrs:   BP Temp Temp src Pulse Resp SpO2 Height Weight   09/23/23 1700 (!) 165/117 -- -- 73 16 96 % -- --   09/23/23 1630 (!) 177/127 -- -- 68 16 97 % -- --   09/23/23 1545 (!) 155/104 98.1  F (36.7  C) Temporal 79 16 94 % 1.549 m (5' 1\") 34 kg (75 lb)        Physical Exam  Vitals reviewed.   HENT:      Head: Normocephalic.   Eyes:      General: No scleral icterus.     Extraocular Movements: Extraocular movements intact.   Cardiovascular:      Rate and Rhythm: Normal rate and regular rhythm.   Abdominal:      Tenderness: There is abdominal tenderness in the epigastric area.   Skin:     General: Skin is warm.      Capillary Refill: Capillary refill takes less than 2 seconds.   Neurological:      General: No focal deficit present.      Mental Status: She is alert.   Psychiatric:         Mood and Affect: Mood normal.         Emergency Department Course     ECG results from 09/23/23   EKG 12 lead     Value    Systolic Blood Pressure     Diastolic Blood Pressure     Ventricular Rate 67    Atrial Rate 67    IN Interval 150    QRS Duration 72        QTc 456    P Axis 12    R AXIS -1    T Axis 21    Interpretation ECG      Sinus rhythm  Low voltage QRS  Cannot rule out Anterior infarct (cited on or before 05-SEP-2023)  Abnormal ECG  When compared with ECG of 05-SEP-2023 09:51,  No significant change was found       *Note: Due to a large number of results " and/or encounters for the requested time period, some results have not been displayed. A complete set of results can be found in Results Review.       Imaging:  CT Abdomen Pelvis w/o Contrast    (Results Pending)      Report per radiology    Laboratory:  Labs Ordered and Resulted from Time of ED Arrival to Time of ED Departure   TROPONIN T, HIGH SENSITIVITY - Abnormal       Result Value    Troponin T, High Sensitivity 40 (*)    LACTIC ACID WHOLE BLOOD - Normal    Lactic Acid 1.0     LIPASE - Normal    Lipase 19            Emergency Department Course & Assessments:     Interventions:  Medications   HYDROmorphone (PF) (DILAUDID) injection 0.5 mg (0.5 mg Intravenous $Given 9/23/23 1750)   ondansetron (ZOFRAN) injection 4 mg (4 mg Intravenous $Given 9/23/23 1751)        Assessments:  1605 I obtained history and examined the patient, as noted above.  1802 I rechecked and updated the patient.    Independent Interpretation (X-rays, CTs, rhythm strip):  None    Consultations/Discussion of Management or Tests:  None   ED Course as of 09/24/23 1741   Sat Sep 23, 2023   2017 Discussed the case with Dr. Carias, hospitalist who accepts the patient for admission.       Social Determinants of Health affecting care:       Disposition:  The patient was discharged to home.     Impression & Plan      Medical Decision Making:  Patient is a 72-year-old female who presents with severe upper abdominal pain.  Patient has a history of kidney transplant 19 years ago with no complication.  Presents with severe upper abdominal pain.  Cause of her symptoms are unclear.  Difficult to manage at home.  Suspect patient might require admission lab work shows slight elevation in creatinine no evidence of lipase or LFTs.  Recommended CT for full assessment.  This was delayed due to radiology delays.  Signed out to Dr. Mcallister.  If negative or positive patient still might require admission for pain control.      Diagnosis:    ICD-10-CM    1. Abdominal  pain, unspecified abdominal location  R10.9 Medication Therapy Management Referral      2. AMY (acute kidney injury) (H)  N17.9       3. Abdominal pain, epigastric  R10.13            Discharge Medications:  New Prescriptions    No medications on file        Scribe Disclosure:  I, Tamara Rothman, am serving as a scribe at 4:08 PM on 9/23/2023 to document services personally performed by Martin Camp MD based on my observations and the provider's statements to me.     9/23/2023   Martin Camp MD Goodman, Brian Samuel, MD  09/24/23 9043

## 2023-09-24 PROBLEM — R10.9 ABDOMINAL PAIN: Status: ACTIVE | Noted: 2023-01-01

## 2023-09-24 NOTE — TELEPHONE ENCOUNTER
Reason for Call:  Appointment Request    Patient requesting this type of appt:  Hospital/ED Follow-Up     Requested provider:  N/A    Reason patient unable to be scheduled:  Appt is virtual and would like for it to be in person if applicable.    When does patient want to be seen/preferred time: 1-2 days    Comments: Pt would like to talk to someone regarding scheduling an in person appt rather than virtual.    Could we [facility] this information to you [facility] or would you prefer to receive a phone call?:   Patient would prefer a phone call   Okay to leave a detailed message?: Yes at Home number on file [phone] (home)    Call taken on 9/24/2023 at 4:13 PM by Mario Price

## 2023-09-24 NOTE — PLAN OF CARE
Goal Outcome Evaluation:    Orientation: AOX4  Observation Goals (met & not met): Not met  Activity Level: Ax1 w/GBW  Fall Risk: Yes  Behavior & Aggression Tool Color: Green  Pain Management: Given PRN oxy x1 and PRN tylenol x1  ABNL VS/O2: VSS on 2 L O2  ABNL Lab/BG: Troponin 39, Cr 2.71. Mg 1.4, replaced.  Diet: Clear liquids  Bowel/Bladder: Continent  Skin: Hematoma on left sclera, red blanchable and small hole on coccyx. Scattered bruises on bilateral Forearms and abdomen. Healed R&L groin incision sides.   Drains/Devices: R. PIV infusing NS @ 75 ml/hrs.  Tests/Procedures for next shift: Mg, BMP, CBC  Consult: PT following  Anticipated DC date: TBD  Other Important Info: Enteric and special precautions

## 2023-09-24 NOTE — PHARMACY-ADMISSION MEDICATION HISTORY
Pharmacist Admission Medication History    Admission medication history is complete. The information provided in this note is only as accurate as the sources available at the time of the update.    Medication reconciliation/reorder completed by provider prior to medication history? No    Information Source(s): Patient and CareEverywhere/SureScripts via in-person    Pertinent Information: Of note, pt states primary provider took hydralazine from 100 mg tid to 10 mg tid since last discharge due to stabilizing blood pressures, and took off magnesium.  Also, patient just started Nexium this morning.    Patient has Combivent inhaler in hospital.    Changes made to PTA medication list:  Added: torsemide, Combivent  Deleted: magnesium  Changed: hydralazine (100 mg tid to 10 mg tid)    Medication Affordability:  Not including over the counter (OTC) medications, was there a time in the past 3 months when you did not take your medications as prescribed because of cost?: Unable to Assess    Allergies reviewed with patient and updates made in EHR: unable to assess    Medication History Completed By: Cristofer Kwan [facility] 9/23/2023 8:43 PM    Prior to Admission medications    Medication Sig Last Dose Taking? Auth Provider Long Term End Date   acetaminophen (TYLENOL) 325 MG tablet Take 2 tablets (650 mg) by mouth every 4 hours as needed for other (For optimal non-opioid multimodal pain management to improve pain control.) Unknown Yes Rob Warner MD     albuterol (PROAIR HFA/PROVENTIL HFA/VENTOLIN HFA) 108 (90 Base) MCG/ACT inhaler Inhale 1-2 puffs into the lungs every 6 hours as needed for shortness of breath, wheezing or cough Unknown at prn Yes Nii Mckeon MD Yes    ASPIRIN LOW DOSE 81 MG chewable tablet CHEW AND SWALLOW 1 TABLET (81 MG) BY MOUTH DAILY 9/23/2023 at am Yes Lisa Martinez DO     atorvastatin (LIPITOR) 80 MG tablet Take 1 tablet (80 mg) by mouth daily 9/23/2023 Yes Sincere Rosas MD  Yes    calcium carbonate-vitamin D (CALTRATE) 600-10 MG-MCG per tablet TAKE ONE TABLET BY MOUTH TWICE A DAY 9/23/2023 at am Yes Lisa Martinez DO     clopidogrel (PLAVIX) 75 MG tablet Take 1 tablet (75 mg) by mouth daily 9/23/2023 at am Yes Sincere Rosas MD Yes    esomeprazole (NEXIUM) 40 MG DR capsule Take 1 capsule (40 mg) by mouth every morning (before breakfast) Take 30-60 minutes before eating. 9/23/2023 at am Yes Lisa Martinez DO     hydrALAZINE (APRESOLINE) 10 MG tablet Take 10 mg by mouth 3 times daily 9/23/2023 at am Yes Unknown, Entered By History No    ipratropium-albuterol (COMBIVENT RESPIMAT)  MCG/ACT inhaler Inhale 1 puff into the lungs 4 times daily 9/23/2023 at am Yes Unknown, Entered By History     isosorbide mononitrate (IMDUR) 60 MG 24 hr tablet Take 1 tablet (60 mg) by mouth daily 9/23/2023 at am Yes Marguerite Muñoz APRN CNP Yes    Lactobacillus-Inulin ([facility] DIGESTIVE [facility]) CAPS TAKE ONE CAPSULE BY MOUTH ONCE DAILY (DUE FOR PHYSICAL IN MARCH) 9/23/2023 at am Yes Lisa Martinez DO     metoprolol tartrate (LOPRESSOR) 100 MG tablet Take 1 tablet (100 mg) by mouth daily 9/23/2023 at am Yes Marguerite Muñoz APRN CNP Yes    ondansetron (ZOFRAN ODT) 4 MG ODT tab Take 1 tablet (4 mg) by mouth every 8 hours as needed for nausea 9/22/2023 at prn Yes Lisa Martinez DO     oxyCODONE (ROXICODONE) 5 MG tablet Take 1 tablet (5 mg) by mouth every 4 hours as needed for severe pain 9/23/2023 at am Yes Vy Leung APRN CNP No    predniSONE (DELTASONE) 5 MG tablet Take 1 tablet (5 mg) by mouth daily 9/23/2023 at am Yes Hitesh See MD Yes    psyllium (METAMUCIL/KONSYL) 58.6 % powder Take by mouth every morning 9/23/2023 at am Yes Reported, Patient     tacrolimus (GENERIC EQUIVALENT) 0.5 MG capsule Take 4 capsules (2 mg) by mouth 2 times daily 9/23/2023 at am Yes Hitesh See MD     torsemide (DEMADEX) 10 MG tablet Take 10 mg by mouth daily as needed 9/23/2023  at am Yes Unknown, Entered By History No    Nutritional Supplements (BOOST VERY HIGH CALORIE) LIQD Take 8 oz by mouth 2 times daily   Magali Main MD     Nutritional Supplements (ENSURE CLEAR) LIQD Take 1 Bottle by mouth daily   Lisa Martinez, DO

## 2023-09-24 NOTE — CONSULTS
Gastroenterology Consultation    Patient: Maribel Yang   1950  Date of Consult:  9/24/2023  Admission Date/Time: 9/23/2023  3:49 PM  Primary Care Provider:  Lisa Martinez was asked to see this patient in consultation by Mary Kate Carias MD for evaluation of abdominal pain.    HPI:  Maribel Yang is a 72 year old female who recently had a covid infection and developed intermittent abdominal pain since then. She reported mostly constipation, and one episode of explosive diarrhea several days ago. She is currently without pain, and stated she often has it at night. Her diet has been limited to clears since admission. No sob at rest, using nasal canal O2.    Had EGD in February 2023, colonoscopy 2022.    Kidney transplant 19 years ago.    REVIEW OF SYSTEMS:  A comprehensive ten point review of systems was performed and was negative aside from those in mentioned in the HPI.      PAST MEDICAL HISTORY:  Past Medical History:   Diagnosis Date    Abnormal coagulation profile     p [id number] heterozygote     Age-related osteoporosis without current pathological fracture 06/22/2019    Anemia     Antiplatelet or antithrombotic long-term use     ASCUS with positive high risk HPV 2007, 2015    + HPV 56, 54,& 6, colp - TAL III, Leep =TAL II    Basal cell carcinoma     Congestive heart failure, unspecified HF chronicity, unspecified heart failure type (H) 06/22/2021    COPD (chronic obstructive pulmonary disease) (H)     Depressive disorder 07/2015    Heart attack (H)     History of blood transfusion     Hypertension     Immunosuppressed status (H)     due meds    Kidney replaced by transplant 09/2004    Living donor recipient,  Rejection 7/2005    LSIL (low grade squamous intraepithelial lesion) on Pap smear 04/2013    +HPV 33 or 45, 61      PAD (peripheral artery disease) (H)     PONV (postoperative nausea and vomiting)     Squamous cell lung cancer (H)     Thrombosis of leg 1967    Unspecified disorder of kidney  and ureter     X-linked dominant Alport's syndrome.       PAST SURGICAL HISTORY:  Past Surgical History:   Procedure Laterality Date    BIOPSY      Kidney, Lung, Breast    BIOPSY ANAL N/A 03/14/2018    Procedure: BIOPSY ANAL;;  Surgeon: Shabbir Leo MD;  [facility]    BYPASS GRAFT FEMORAL FEMORAL Bilateral 04/25/2021    Procedure: Axplantation infected graft, femoral to femoral bypass with cadaveric artery, bilateral SATORIUS MUSCLE FLAP CREATION, evacuation of absece, vacuum closure;  Surgeon: Raven Lewis MD;  [facility]    COLONOSCOPY      COLONOSCOPY N/A 08/09/2017    Procedure: COMBINED COLONOSCOPY, SINGLE OR MULTIPLE BIOPSY/POLYPECTOMY BY BIOPSY;;  Surgeon: Sushil Hyatt MD;  [facility]: [facility]    COLONOSCOPY N/A 7/28/2022    Procedure: COLONOSCOPY, WITH BIOPSY;  Surgeon: Moise Corcoran MD;  [facility]    COLPOSCOPY,LOOP ELECTRD CERVIX EXCIS  03/11/2008    TAL II    CONIZATION LEEP  07/17/2013    Procedure: CONIZATION LEEP;;  Surgeon: Liliana Renteria MD;  [facility]    CONIZATION LEEP N/A 08/17/2016    Procedure: CONIZATION LEEP;  Surgeon: Liliana Renteria MD;  [facility]    CV CORONARY ANGIOGRAM N/A 04/06/2021    Procedure: CV CORONARY ANGIOGRAM;  Surgeon: Sincere Rosas MD;  Location:  [facility]    CV CORONARY ANGIOGRAM N/A 04/25/2021    Procedure: Coronary Angiogram;  Surgeon: Sincere Rosas MD;  Location:  [facility]    CV PCI STENT DRUG ELUTING N/A 04/06/2021    Procedure: Percutaneous Coronary Intervention Stent Drug Eluting;  Surgeon: Sincere Rosas MD;  Location:  [facility]    ENDOVASCULAR REPAIR ANEURYSM AORTOILIAC Bilateral 04/06/2021    Procedure: Endovascular Abdominal Aortic Aneurysm Repair with Aortouniiliac Device and Femoral-Femoral Artery Bypass with [id number] Artegraft;  Surgeon: Abena Ferrara MD;  [facility]    ESOPHAGOSCOPY, GASTROSCOPY,  DUODENOSCOPY (EGD), COMBINED N/A 2/2/2023    Procedure: ESOPHAGOGASTRODUODENOSCOPY, WITH BIOPSY;  Surgeon: Yazan Heredia MD;  Location:  GI    EXAM UNDER ANESTHESIA ANUS  07/15/2014    Procedure: EXAM UNDER ANESTHESIA ANUS;  Surgeon: Radha Musa MD;  [facility]    EXAM UNDER ANESTHESIA ANUS N/A 03/14/2018    Procedure: EXAM UNDER ANESTHESIA ANUS;  Anal Exam Under Anesthesia With Excision of anal lesion, proctoscopy;  Surgeon: Shabbir Leo MD;  [facility]    EYE SURGERY      GENITOURINARY SURGERY      IR OR ANGIOGRAM  04/06/2021    IRRIGATION AND DEBRIDEMENT GROIN Right 04/24/2021    Procedure: IRRIGATION AND DEBRIDEMENT, INGUINAL REGION, I & D PF RIGHT GROIN;  Surgeon: Raven Lewis MD;  [facility]    IRRIGATION AND DEBRIDEMENT GROIN N/A 04/26/2021    Procedure: IRRIGATION AND DEBRIDEMENT, INGUINAL REGION, PLACEMENT OF VERAFLO WOUND VAC, WOUND WASHOUT,;  Surgeon: Raven Lewis MD;  [facility]    LASER CO2 EXCISE VULVA WIDE LOCAL  07/15/2014    Procedure: LASER CO2 EXCISE VULVA WIDE LOCAL;  Surgeon: Liliana Renteria MD;  [facility]    LASER CO2 VAGINA  07/17/2013    Procedure: LASER CO2 VAGINA;;  Surgeon: Liliana Renteria MD;  [facility]    LASER CO2 VAGINA N/A 09/25/2018    Procedure: LASER CO2 VAGINA;  Exam Under Anesthesia, CO2 Laser Ablation of Upper Vagina and Cervix;  Surgeon: Pati Garcia MD;  [facility]    MICROSCOPY ANAL  07/17/2013    Procedure: MICROSCOPY ANAL;  Anal Microscopy,  EUA vagina,Colposcopy Of Vagina And Vulva, Vaginal Biopsies, Omniguide Co2 Laser To Vagina and vulva, Loop Electrosurgical Excision Procedure To Cervix;  Surgeon: Radha Musa MD;  [facility]    MICROSCOPY ANAL  07/15/2014    Procedure: MICROSCOPY ANAL;  Surgeon: Radha Musa MD;  [facility]    PICC DOUBLE LUMEN PLACEMENT Right 04/30/2021    39cm, Basilic vein    PSEUDOANEURYSM REPAIR Right 6/27/2023    Procedure:  RIGHT FEMORAL TO PROFUNDA FEMORIS ARTERY BYPASS WITH cadaveric femoral-popliteal artery, REPAIR OF RIGHT GROIN PSEUDOANEURYSM;  Surgeon: Abena Ferrara MD;  [facility]    TRANSESOPHAGEAL ECHOCARDIOGRAM INTRAOPERATIVE N/A 04/28/2021    Procedure: ECHOCARDIOGRAM, TRANSESOPHAGEAL, INTRAOPERATIVE;  Surgeon: GENERIC ANESTHESIA PROVIDER;  [facility]: [facility]    VASCULAR SURGERY      Thrombectomy    [facility] NONSPECIFIC PROCEDURE      Thrombectomy    [facility] NONSPECIFIC PROCEDURE  1955 and 1959    Bilater eye surgery - correction for crossed eyes    [facility] NONSPECIFIC PROCEDURE  1998    oopherectomy L    [facility] NONSPECIFIC PROCEDURE  1967    open kidney biopsy - L    [facility] TRANSPLANTATION OF KIDNEY  09/2004    recip[facility]        MEDICATIONS:   :   Prior to Admission Medications   Prescriptions Last Dose Informant Patient Reported? Taking?   ASPIRIN LOW DOSE 81 MG chewable tablet 9/23/2023 at am Self No Yes   Sig: CHEW AND SWALLOW 1 TABLET (81 MG) BY MOUTH DAILY   Lactobacillus-Inulin (XcelaeroE DIGESTIVE HEALTH) CAPS 9/23/2023 at am Self No Yes   Sig: TAKE ONE CAPSULE BY MOUTH ONCE DAILY (DUE FOR PHYSICAL IN MARCH)   Nutritional Supplements (BOOST VERY HIGH CALORIE) LIQD   No No   Sig: Take 8 oz by mouth 2 times daily   Nutritional Supplements (ENSURE CLEAR) LIQD  Self No No   Sig: Take 1 Bottle by mouth daily   acetaminophen (TYLENOL) 325 MG tablet Unknown Self No Yes   Sig: Take 2 tablets (650 mg) by mouth every 4 hours as needed for other (For optimal non-opioid multimodal pain management to improve pain control.)   albuterol (PROAIR HFA/PROVENTIL HFA/VENTOLIN HFA) 108 (90 Base) MCG/ACT inhaler Unknown at prn Self No Yes   Sig: Inhale 1-2 puffs into the lungs every 6 hours as needed for shortness of breath, wheezing or cough   atorvastatin (LIPITOR) 80 MG tablet 9/23/2023 Self No Yes   Sig: Take 1 tablet (80 mg) by mouth daily   calcium carbonate-vitamin D (CALTRATE) 600-10 MG-MCG per tablet 9/23/2023 at am Self No Yes    Sig: TAKE ONE TABLET BY MOUTH TWICE A DAY   clopidogrel (PLAVIX) 75 MG tablet 9/23/2023 at am Self No Yes   Sig: Take 1 tablet (75 mg) by mouth daily   esomeprazole (NEXIUM) 40 MG DR capsule 9/23/2023 at am  No Yes   Sig: Take 1 capsule (40 mg) by mouth every morning (before breakfast) Take 30-60 minutes before eating.   hydrALAZINE (APRESOLINE) 10 MG tablet 9/23/2023 at am  Yes Yes   Sig: Take 10 mg by mouth 3 times daily   ipratropium-albuterol (COMBIVENT RESPIMAT)  MCG/ACT inhaler 9/23/2023 at am  Yes Yes   Sig: Inhale 1 puff into the lungs 4 times daily   isosorbide mononitrate (IMDUR) 60 MG 24 hr tablet 9/23/2023 at am Self No Yes   Sig: Take 1 tablet (60 mg) by mouth daily   metoprolol tartrate (LOPRESSOR) 100 MG tablet 9/23/2023 at am Self No Yes   Sig: Take 1 tablet (100 mg) by mouth daily   ondansetron (ZOFRAN ODT) 4 MG ODT tab 9/22/2023 at prn  No Yes   Sig: Take 1 tablet (4 mg) by mouth every 8 hours as needed for nausea   oxyCODONE (ROXICODONE) 5 MG tablet 9/23/2023 at am Self No Yes   Sig: Take 1 tablet (5 mg) by mouth every 4 hours as needed for severe pain   predniSONE (DELTASONE) 5 MG tablet 9/23/2023 at am Self No Yes   Sig: Take 1 tablet (5 mg) by mouth daily   psyllium (METAMUCIL/KONSYL) 58.6 % powder 9/23/2023 at am Self Yes Yes   Sig: Take by mouth every morning   tacrolimus (GENERIC EQUIVALENT) 0.5 MG capsule 9/23/2023 at am Self No Yes   Sig: Take 4 capsules (2 mg) by mouth 2 times daily   torsemide (DEMADEX) 10 MG tablet 9/23/2023 at am  Yes Yes   Sig: Take 10 mg by mouth daily as needed      Facility-Administered Medications: None       ALLERGIES:   :   Allergies   Allergen Reactions    Blood Transfusion Related (Informational Only) Other (See Comments)     Patient has a history of a clinically significant antibody against RBC antigens.  A delay in compatible RBCs may occur.    Tramadol-Acetaminophen Nausea and Vomiting and Hives    Hydrocodone Nausea and Vomiting and Hives  "      SOCIAL HISTORY:  Social History     Tobacco Use    Smoking status: Former     Packs/day: 0.30     Years: 35.00     Pack years: 10.50     Types: Cigarettes     Start date: 1967     Quit date: 3/1/2021     Years since quittin.5    Smokeless tobacco: Never    Tobacco comments:     States smokes once in a while   Vaping Use    Vaping Use: Never used   Substance Use Topics    Alcohol use: Not Currently     Comment: rarely    Drug use: Not Currently     Types: Marijuana     Comment: occasional use       FAMILY HISTORY:  No first degree relative with colon cancer, gastric cancer, crohn's disease, ulcerative colitis, or liver disease.     EXAM:  General Appearance: Alert, oriented x3, no acute distress. Nasal canula O2.  /76 (BP Location: Right arm)   Pulse 66   Temp 97.5  F (36.4  C) (Oral)   Resp 16   Ht 1.549 m (5' 1\")   Wt 37.3 kg (82 lb 4.8 oz)   SpO2 96%   BMI 15.55 kg/m    Eye: sclera anicteric.  CV: RRR.  Resp: decrease BS bilat.  GI: Soft, NABS, NT/ND, no masses except for transplant kidney LLQ.  Musculoskeletal: no joint swelling, or erythema, no pedal edema.  Neuro: alert and non-focal.     Labs and imaging reviewed    Recent Labs   Lab Test 23  0651 23  1143 09/10/23  0650 23  0618 23  1149 23  1323 23  1158 21  0157 21  2200   WBC 11.5* 12.9* 9.5   < > 4.8   < > 14.7*   < >  --    HGB 9.1* 10.0* 10.8*   < > 10.8*   < > 12.0   < >  --    MCV 83 80 83   < > 87   < > 92   < >  --     282 180   < > 252   < > 295   < >  --    INR  --   --   --   --  1.05  --  1.03  --  1.18*    < > = values in this interval not displayed.     Recent Labs   Lab Test 23  0651 23  1143 09/10/23  0650   POTASSIUM 4.9 4.3 4.9   CHLORIDE 99 97* 102   CO2 22 23 21*   BUN 56.9* 60.1* 46.5*   ANIONGAP 12 11 13     Recent Labs   Lab Test 23  1651 23  1143 23  1017 23  0544 23  0447 23  1149 23  1000 " 03/02/23  1400 06/23/21  0555 06/23/21  0505   ALBUMIN  --  3.2* 3.3* 3.1*  --  3.8   < >  --    < >  --    BILITOTAL  --  0.4 0.3 0.2  --  0.2   < >  --    < >  --    ALT  --  10 <5 14  --  15   < >  --    < >  --    AST  --  14 18 30  --  22   < >  --    < >  --    PROTEIN  --   --   --   --  100*  --   --  20*  --  Negative   LIPASE 19 19  --   --   --  35  --   --    < >  --     < > = values in this interval not displayed.       ASSESSMENT AND PLAN:    Elderly female with recent onset of intermittent abdominal pain.  No nausea or vomiting.  Recent Covid-19. Etiology of pain is unclear; possible constipation, or post covid.  Doubt PUD.    Plan: trial of daily laxative and follow.    40 minutes of total time was spent providing patient care, including patient evaluation, reviewing documentation/test results, and .          Troy Fried MD  Thank you for the opportunity to participate in the care of this patient.   Please feel free to call with any questions or concerns.  [phone].       CC: [facility], Lisa Martinez

## 2023-09-24 NOTE — ED NOTES
Patient signed out to me by Dr. Camp pending CT A/P. Patient very complex with HFpEF, ESRD s/p LDKT, Alport Syndrome, CAD and STEMI, PAD/AAA s/p endograft repair, COPD, who was recently hospitalized for COVID infection until 9/10/23.    7:42 PM - patient reassessed by myself. Abd exam remains stable. Currently non tender to palpation. Unclear etiology of her symptoms. Patient has been unable to eat/drink, creatinine is elevated. Patient may benefit from vascular surgery consultation in the hospital given her large aortic aneurysm and ongoing intermittent pain. No evidence of leak/rupture/growth of aneurysm or endograft complication on CT. No other acute findings on CT scan as a cause of her pain. Given findings patient will be admitted for IVF hydration, monitoring of her creatinine, and close monitoring of her symptoms. The case was discussed with the hospitalist and the patient was admitted in stable condition.     Disposition:  Admitted     Juan R Ocasio MD  09/24/23 [id number]

## 2023-09-24 NOTE — PROGRESS NOTES
RECEIVING UNIT ED HANDOFF REVIEW    ED Nurse Handoff Report was reviewed by: Claudia Willoughby RN on September 24, 2023 at 1:14 AM

## 2023-09-24 NOTE — PROGRESS NOTES
Observation goals  PRIOR TO DISCHARGE       Comments: -diagnostic tests and consults completed and resulted- Not met  -vital signs normal or at patient baseline- Met  -tolerating oral intake to maintain hydration-Partially met  -adequate pain control on oral analgesics- Partially Met  Nurse to notify provider when observation goals have been met and patient is ready for discharge.

## 2023-09-24 NOTE — PROGRESS NOTES
Summary:  Admitted for severe abdominal pain on 9/23/2023    Care Plan Summary Note:  Orientation: A/OX4  Observation Goals (met & not met): Not met  Activity Level: Ax1 w/GBW  Fall Risk: Yes  Behavior & Aggression Tool Color: Green  Pain Management: Mild abd pain. Denies pharmacological intervention at this time.   ABNL VS/O2: VSS on 3L O2  Diet: Tolerating clears. Advanced to full liquid diet.   Bowel/Bladder: Continent of B&B. No BM.   Drains/Devices: R. PIV infusing NS @ 75 ml/hr  Tests/Procedures for next shift:  Consult: PT, Gatroenterology  Anticipated DC date: TBD  Other Important Info: Enteric and special precautions

## 2023-09-24 NOTE — PROGRESS NOTES
[facility]    Medicine Progress Note - Hospitalist Service    Date of Admission:  9/23/2023    Assessment & Plan      Maribel Yang is a 72 year old female with ESRD due to Alport syndrome, s/p LD KT, chronic diastolic CHF with preserved EF, severe malnutrition, cachexia, recent admission at [facility] from 9/5-9/10 due to COVID-19 with hypoxic respiratory failure, known small pericardial effusion without tamponade, COPD, who presents on 9/23/2023 with abdominal pain.    She was recently hospitalized at [facility] from 9/5-9/10 due to COVID-19 pneumonitis with respiratory failure.  Reports she started experiencing mild abdominal discomfort while she was hospitalized.  Abdominal pain worsened on day of admission.  No nausea or vomiting, no fever or chills, no diarrhea or hematochezia.  Reports constipation, anorexia.      CT A/P showed prominent aneurysmal dilatation of descending thoracic aorta measuring 6 cm, small pericardial effusion, mild to moderate atelectasis, slight calcification in pancreas which could represent chronic pancreatitis.  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  Moderate diverticulosis without diverticulitis.  No cause to explain abdominal pain was seen on CT scan.      Mid abdominal pain-unclear etiology  - Has mild leukocytosis of 12.9, elevated CRP of 62 (up from 22 two weeks ago), Lactate normal at 1.  Nontender on exam. LFTs at baseline.  Lipase normal  - CT scan did not show any cause to explain the pain  - Last EGD was in February 2023.  This showed normal esophagus, gastroesophageal flap valve, 2 cm hiatal hernia.  Biopsies were obtained and were negative for inflammation or malignancy.  - Last colonoscopy was in July 2022 that showed moderately congested mucosa in transverse colon, ascending colon and cecum, diffuse mild inflammation in ascending colon and cecum, diverticulosis of sigmoid colon.  Biopsies showed chronic inflammation and patchy fibrosis.  - Cause of  abdominal pain is unclear.  - Clear liquid diet, advance as tolerated, Tylenol and oxycodone for pain control, Zofran for nausea or vomiting.  Protonix  - Consulted GI immunocompromised situation.    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  Recent tacrolimus level was elevated at 8.3 on 9/7.  - Follows up with transplant nephrology  - Baseline creatinine up to 1.9.  Creatinine is now elevated at 2.7, likely prerenal due to poor oral intake and use of diuretics  Creatinine is slowly improving, 2.6 this morning down from 2.7 on admission.  -Continue holding torsemide  -Continue gentle hydration with NS at 75 mL/h  - Continue prednisone 5 mg daily and tacrolimus 2 mg twice daily (nephrology note from 9/10 mentions reducing dose to 1.5 mg twice daily.  Unsure what dose patient is on)    Hypomagnesemia, magnesium 1.4  - Administer 2 g IV magnesium sulfate  -Serum magnesium level improved to 2.5 this morning.    Coronary artery disease, s/p inferior STEMI, s/p RCA stenting, 4/2021  Chronic elevation of troponin  Chronic diastolic CHF with recovered EF  Essential hypertension  - On DAPT, Imdur, hydralazine, metoprolol, statin  - Troponin elevated at 40, 39.  This is her baseline.  Trend is flat  -Clinically euvolemic.  -Continue aspirin, Plavix, atorvastatin, hydralazine 10 mg 3 times daily, Imdur, metoprolol 100 mg daily    Recent COVID-19 infection with bilateral pneumonitis with acute hypoxic respiratory failure  -Tested positive on 8/30.  Hospitalized at [facility] from 9/5-9/10.  Treated with IV remdesivir and dexamethasone.  Was discharged on room air.  -Doing well.  No current issues  -Isolation precautions contraindicated at this point.    Generalized weakness  -During recent stay at [facility], PT OT expressed concerns about patient returning home and her ability to complete ADLs.  Patient declined home PT. reports she was able to do ADLs until  "yesterday  -Consult PT    Small pericardial effusion without tamponade  -Limited echo 9/5 showed EF 50-55% normal RV function, mild-moderate pericardial effusion of 1.6 cm without evidence of tamponade.  -CT scan on admission mentioned tiny pericardial effusion      Peripheral vascular disease  Aneurysmal dilation of the ascending thoracic aorta measuring 6 cm  AAA, S/p endovascular aneurysm repair and femorofemoral bypass, 4/2021  S/p right femoral to profunda femoris artery bypass and repair of right femoral pseudoaneurysm, 6/2023  -Followed by vascular surgery      Small cell carcinoma of right lung, s/p SBRT, 2014  Squamous cell carcinoma of anus, s/p full-thickness excision and chemoradiation, 2018  -Stable    Anemia of chronic renal disease  -Hemoglobin stable at 10.    COPD  -No acute exacerbation  -Continue Combivent inhaler 4 times daily         Diet: Advance Diet as Tolerated: Clear Liquid Diet  Snacks/Supplements Adult: Ensure Enlive; With Meals    DVT Prophylaxis: Pneumatic Compression Devices  Villavicencio Catheter: Not present  Lines: None     Cardiac Monitoring: None  Code Status: Full Code      Clinically Significant Risk Factors Present on Admission            # Hypomagnesemia: Lowest Mg = 1.4 mg/dL in last 2 days, will replace as needed   # Hypoalbuminemia: Lowest albumin = 3.2 g/dL at 9/23/2023 11:43 AM, will monitor as appropriate   # Drug Induced Platelet Defect: home medication list includes an antiplatelet medication  # Acute Kidney Injury, unspecified: based on a >150% or 0.3 mg/dL increase in last creatinine compared to past 90 day average, will monitor renal function  # Hypertension: Noted on problem list  # Chronic heart failure with preserved ejection fraction: heart failure noted on problem list and last echo with EF >50%     # Cachexia: Estimated body mass index is 15.55 kg/m  as calculated from the following:    Height as of this encounter: 1.549 m (5' 1\").    Weight as of this encounter: " 37.3 kg (82 lb 4.8 oz).       # COPD: noted on problem list        Disposition Plan      Expected Discharge Date: 09/25/2023         Discharge likely tomorrow pending therapy input,         Chuck Pierre MD  Hospitalist Service  [facility]  Securely message with [employer] (more info)  Text page via [facility] Paging/Directory   ______________________________________________________________________    Interval History   Has significantly improved, will admit that she has most of her pain at night.  She gets profuse diarrhea with any meaningful food intake.  Denies fever, chills, chest pain, cough, shortness of breath.  Discussed case with the bedside nurse.    Physical Exam   Vital Signs: Temp: 97.2  F (36.2  C) Temp src: Oral BP: 130/87 Pulse: 79   Resp: 18 SpO2: 99 % O2 Device: Nasal cannula Oxygen Delivery: 3 LPM  Weight: 82 lbs 4.8 oz    General Appearance: Alert soft frail elderly woman almost cachectic appearing in mild discomfort from abdominal pain.  Respiratory: Clear to auscultation no wheezing or crackles  Cardiovascular: Regular rhythm no murmurs or gallops  GI: Abdomen is distended, soft, positive bowel sounds.      Medical Decision Making       50 MINUTES SPENT BY ME on the date of service doing chart review, history, exam, documentation & further activities per the note.      Data

## 2023-09-24 NOTE — PROGRESS NOTES
09/24/23 0814   Appointment Info   Signing Clinician's Name / Credentials (PT) Juan R Escalera DPT   Quick Adds   Quick Adds Certification   Living Environment   People in Home spouse   Current Living Arrangements house   Home Accessibility stairs to enter home;stairs within home   Number of Stairs, Main Entrance 3   Stair Railings, Main Entrance none   Number of Stairs, Within Home, Primary greater than 10 stairs  (14)   Stair Railings, Within Home, Primary railing on left side (ascending)   Transportation Anticipated family or friend will provide   Living Environment Comments Reports that she lives in a house w/ her . 3 RHEA w/o rails. Reports usually she holds onto her  on the L side and if she is not feeling well reports that her daughter is there and holds onto her as well. Reports 14 stairs to bedroom w/ L sided railing. States that he  normally is holding onto her or she will crawl up them on her hands and knees.   Self-Care   Usual Activity Tolerance moderate   Current Activity Tolerance fair   Regular Exercise No   Equipment Currently Used at Home walker, rolling   Fall history within last six months no   Activity/Exercise/Self-Care Comment Reports at baseline uses a 4WW out in the community and no AD within the home but furniture walks per previous notes. States since her most recent hospitalization w/ COVID has been using the walker all the time. States  and daughter helps w/ most ADLs. Pt states daughter there most days.   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     She was recently hospitalized at [facility] from 9/5-9/10 due to COVID-19 pneumonitis with respiratory failure.  Reports she started experiencing mild abdominal discomfort while she was hospitalized.  Abdominal pain worsened on day of admission.  No nausea or vomiting, no fever or chills, no diarrhea or hematochezia.  Reports constipation, anorexia.   General Observations Very frail appearing   Cognition   Affect/Mental Status (Cognition) WFL   Orientation Status (Cognition) oriented x 4   Follows Commands (Cognition) WFL   Pain Assessment   Patient Currently in Pain No   Posture    Posture Forward head position;Kyphosis   Range of Motion (ROM)   ROM Comment WFLs for mobility and transfers no formal testing completed   Strength (Manual Muscle Testing)   Strength Comments Generalized weakness w/ mobility and transfers   Bed Mobility   Comment, (Bed Mobility) Supine to sitting EOB w/ HOB flat and use of bed rail w/ SBA   Transfers   Comment, (Transfers) Sit to stand w/ FWW and SBA   Gait/Stairs (Locomotion)   Comment, (Gait/Stairs) 5 ft w/ FWW and CGA   Balance   Balance Comments No overt LOB noted, however, some decreased balance noted w/ standing mobility   Clinical Impression   Criteria for Skilled Therapeutic Intervention Yes, treatment indicated   PT Diagnosis (PT) Impaired ambulation   Influenced by the following impairments Impaired strength, balance and activity tolerance   Functional limitations due to impairments Impaired ADLs, IADLs and functional mobility   Clinical Presentation (PT Evaluation Complexity) Stable/Uncomplicated   Clinical Presentation Rationale Clinical judgment   Clinical Decision Making (Complexity) low complexity   Planned Therapy Interventions (PT) balance training;bed mobility training;gait training;home exercise  program;neuromuscular re-education;patient/family education;stair training;strengthening;transfer training;progressive activity/exercise   Risk & Benefits of therapy have been explained evaluation/treatment results reviewed;care plan/treatment goals reviewed;risks/benefits reviewed;current/potential barriers reviewed;participants voiced agreement with care plan;participants included;patient   PT Total Evaluation Time   PT Eval, Low Complexity Minutes (22801) 10   Therapy Certification   Start of care date 09/24/23   Certification date from 09/24/23   Certification date to 10/04/23   Medical Diagnosis Abdominal pain   Physical Therapy Goals   PT Frequency 6x/week   PT Predicted Duration/Target Date for Goal Attainment 10/04/23   PT Goals Bed Mobility;Transfers;Gait;Stairs   PT: Bed Mobility Independent;Supine to/from sit   PT: Transfers Modified independent;Sit to/from stand;Assistive device   PT: Gait Modified independent;150 feet;Rolling walker   PT: Stairs Supervision/stand-by assist;Greater than 10 stairs;Rail on left   Interventions   Interventions Quick Adds Therapeutic Activity   Therapeutic Activity   Therapeutic Activities: dynamic activities to improve functional performance Minutes (15575) 3   Symptoms Noted During/After Treatment Fatigue   Treatment Detail/Skilled Intervention Pt supine in bed at start of session. Agreeable to PT. Pt awaking from sleep prior to session. Once seated EOB Pt w/ good sitting balance. Pt on 2 L of O2 via NC here, however, reports normally not on O2 at home. O2 sats in mid 90s on 2 L at start of session. Pt completing sit to stand from bed height x3 w/ FWW and SBA. O2 sats reamining in the mid 90s on 2 L of O2 via NC at end of session. Sit to supine w/ SBA. Extensive talk about DC recommendations, as Pt reports home had not been going well as it has been a lot more work since recent DC, Pt however refusing TCU at this time and would like to DC back home w/ HHPT. Reports has  good social support from  and daughter. All needs met at end of session w/ call light in place and bed alarm on. RN in room at end of session.   Gait Training   Gait Training Minutes (25800) 21   Symptoms Noted During/After Treatment (Gait Training) fatigue   Treatment Detail/Skilled Intervention Pt ambulating ~25 ft x1 w/ FWW and CGA progressing to SBA and 50 ft x1 w/ SBA and FWW. Limited space in room d/t COVID precautions. No overt LOB noted. Slow jaqueline and downward gaze. Pt completing x2 stairs (yoga step) w/o rails w/ Min A x1. Pt holding onto therapists arm on L. Reports this is how she completes stairs w/  at home. Pt then completing x6 stairs w/ alteranting bed railing on L/R as using yoga step w/ CGA. Using hand from therapist at times in other hand. No overt LOB noted w/ stairs. Slow jaqueline throughout.   PT Discharge Planning   PT Plan Increase activity tolerance, ambulation distance, gait w/ and w/o FWW, review stairs, repeat STS   PT Discharge Recommendation (DC Rec) Transitional Care Facility;home with assist;home with home care physical therapy   PT Rationale for DC Rec Pt below baseline mobility. Lives in house w/ . Lots of stairs to complete. Reports increased difficulty at home since most recent hospitalization and has since been using walker all the time.  has been increasing assistance for ADLs. At this time would recommend DC to TCU d/t increased needs at home, however, Pt adamently refusing. If Pt were to return home would recommend 24/7 assist for mobility and HHPT to improve upon functional strengthening as well as continued use of walker all the time.   PT Brief overview of current status SBA w/ FWW, Min A stairs no rails, CGA for stairs w/ railings   Total Session Time   Timed Code Treatment Minutes 24   Total Session Time (sum of timed and untimed services) 34   [facility]  OUTPATIENT PHYSICAL THERAPY EVALUATION  PLAN OF  TREATMENT FOR OUTPATIENT REHABILITATION  (COMPLETE FOR INITIAL CLAIMS ONLY)  Patient's Last Name, First Name, M.I.  YOB: 1950  Maribel Yang                        Provider's Name  [facility] Medical Record No.  [id number]                             Onset Date:  09/23/23   Start of Care Date:  09/24/23   Type:     _X_PT   ___OT   ___SLP Medical Diagnosis:  Abdominal pain              PT Diagnosis:  Impaired ambulation Visits from SOC:  1     See note for plan of treatment, functional goals and certification details    I CERTIFY THE NEED FOR THESE SERVICES FURNISHED UNDER        THIS PLAN OF TREATMENT AND WHILE UNDER MY CARE     (Physician co-signature of this document indicates review and certification of the therapy plan).

## 2023-09-24 NOTE — H&P
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    She was recently hospitalized at [facility] from 9/5-9/10 due to COVID-19 pneumonitis with respiratory failure.  Reports she started experiencing mild abdominal discomfort while she was hospitalized.  Abdominal pain worsened on day of admission.  No nausea or vomiting, no fever or chills, no diarrhea or hematochezia.  Reports constipation, anorexia.      CT A/P showed prominent aneurysmal dilatation of descending thoracic aorta measuring 6 cm, small pericardial effusion, mild to moderate atelectasis, slight calcification in pancreas which could represent chronic pancreatitis.  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  Moderate diverticulosis without diverticulitis.  No cause to explain abdominal pain was seen on CT scan.      Mid abdominal pain-unclear etiology  - Has mild leukocytosis of 12.9, elevated CRP of 62 (up from 22 two weeks ago), Lactate normal at 1.  Nontender on exam. LFTs at baseline.  Lipase normal  - CT scan did not show any cause to explain the pain  - Last EGD was in February 2023.  This showed normal esophagus, gastroesophageal flap valve, 2 cm hiatal hernia.  Biopsies were obtained and were negative for inflammation or malignancy.  - Last colonoscopy was in July 2022 that showed moderately congested mucosa in transverse colon, ascending colon and cecum, diffuse mild inflammation in ascending colon and cecum, diverticulosis of sigmoid colon.  Biopsies showed chronic inflammation and patchy fibrosis.  - Cause of  abdominal pain is unclear.  - Observe for now.  Clear liquid diet, advance as tolerated, Tylenol and oxycodone for pain control, Zofran for nausea or vomiting.  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  Recent tacrolimus level was elevated at 8.3 on 9/7.  - Follows up with transplant nephrology  - Baseline creatinine up to 1.9.  Creatinine is now elevated at 2.7, likely prerenal due to poor oral intake and use of diuretics  - Hold torsemide  - Gentle hydration with NS at 75 mL/h  - Continue prednisone 5 mg daily and tacrolimus 2 mg twice daily (nephrology note from 9/10 mentions reducing dose to 1.5 mg twice daily.  Unsure what dose patient is on)    Hypomagnesemia, magnesium 1.4  - Administer 2 g IV magnesium sulfate    Coronary artery disease, s/p inferior STEMI, s/p RCA stenting, 4/2021  Chronic elevation of troponin  Chronic diastolic CHF with recovered EF  Essential hypertension  - On DAPT, Imdur, hydralazine, metoprolol, statin  - Troponin elevated at 40, 39.  This is her baseline.  Trend is flat  - Blood pressures elevated with systolic in 170s and diastolic of 120  - Appears euvolemic to mildly hypervolemic on exam  -Continue aspirin, Plavix, atorvastatin, hydralazine 10 mg 3 times daily, Imdur, metoprolol 100 mg daily    Recent COVID-19 infection with bilateral pneumonitis with acute hypoxic respiratory failure  -Tested positive on 8/30.  Hospitalized at [facility] from 9/5-9/10.  Treated with IV remdesivir and dexamethasone.  Was discharged on room air.  -Doing well.  No current issues    Generalized weakness  -During recent stay at [facility], PT OT expressed concerns about patient returning home and her ability to complete ADLs.  Patient declined home PT. reports she was able to do ADLs until yesterday  -Consult PT    Small pericardial effusion without tamponade  -Limited  "echo 9/5 showed EF 50-55% normal RV function, mild-moderate pericardial effusion of 1.6 cm without evidence of tamponade.  -CT scan on admission mentioned tiny pericardial effusion      Peripheral vascular disease  Aneurysmal dilation of the ascending thoracic aorta measuring 6 cm  AAA, S/p endovascular aneurysm repair and femorofemoral bypass, 4/2021  S/p right femoral to profunda femoris artery bypass and repair of right femoral pseudoaneurysm, 6/2023  -Followed by vascular surgery      Small cell carcinoma of right lung, s/p SBRT, 2014  Squamous cell carcinoma of anus, s/p full-thickness excision and chemoradiation, 2018  -Stable    Anemia of chronic renal disease  -Hemoglobin stable at 10.    COPD  -No acute exacerbation  -Continue Combivent inhaler 4 times daily         Diet:  Clear liquid diet,  DVT Prophylaxis: Low Risk/Ambulatory with no VTE prophylaxis indicated  Villavicencio Catheter: Not present  Lines: None     Cardiac Monitoring: None  Code Status:  Full code    Clinically Significant Risk Factors Present on Admission            # Hypomagnesemia: Lowest Mg = 1.4 mg/dL in last 2 days, will replace as needed   # Hypoalbuminemia: Lowest albumin = 3.2 g/dL at 9/23/2023 11:43 AM, will monitor as appropriate   # Drug Induced Platelet Defect: home medication list includes an antiplatelet medication  # Acute Kidney Injury, unspecified: based on a >150% or 0.3 mg/dL increase in last creatinine compared to past 90 day average, will monitor renal function  # Hypertension: Noted on problem list  # Chronic heart failure with preserved ejection fraction: heart failure noted on problem list and last echo with EF >50%     # Cachexia: Estimated body mass index is 14.17 kg/m  as calculated from the following:    Height as of this encounter: 1.549 m (5' 1\").    Weight as of this encounter: 34 kg (75 lb).       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    She was recently hospitalized at [facility] from 9/5-9/10 due to COVID-19 pneumonitis with respiratory failure.  Reports she started experiencing mild abdominal discomfort while she was hospitalized.  Her abdominal pain has worsened since then.  Reports yesterday she was doing well.  She was ambulating, folding laundry but today she had significant mid abdominal pain because of which she has not been able to carry out ADLs and has not been able to eat and drink well.    Denies any nausea or vomiting.  Denies any fever or chills.  Reports she has been constipated.  No diarrhea.    Reports she discussed with her PCP today and labs were obtained.  CRP was elevated at 62, up from 22 two weeks ago.    Magnesium was low at 1.4, sodium 131.  Her creatinine had increased from 2 to 2.7.  LFTs were at baseline.  Lipase was normal.  D-dimer elevated at 16 but this is her baseline.  She has had chronic elevation of D-dimer.  Lactate normal at 1    She has chronically elevated troponin.  Troponin on admission 43 which is her baseline. Subsequent was stable at 39    CT abdomen pelvis was obtained in ER.  This showed prominent aneurysmal dilatation of descending thoracic aorta measuring 6 cm, small pericardial effusion, mild to moderate atelectasis, slight calcification in pancreas which could  represent chronic pancreatitis.  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  Moderate diverticulosis without diverticulitis.  No cause to explain abdominal pain was seen on CT scan.    Her last EGD was in February 2023.  This showed normal esophagus, gastroesophageal flap valve, 2 cm hiatal hernia.  Biopsies were obtained and were negative for inflammation or malignancy.    Last colonoscopy was in July 2022 that showed moderately congested mucosa in transverse colon, ascending colon and cecum, diffuse mild inflammation in ascending colon and cecum, diverticulosis of sigmoid colon.  Biopsies showed chronic inflammation and patchy fibrosis.      Past Medical History    Past Medical History:   Diagnosis Date    Abnormal coagulation profile     p [id number]>A heterozygote     Age-related osteoporosis without current pathological fracture 06/22/2019    Anemia     Antiplatelet or antithrombotic long-term use     ASCUS with positive high risk HPV 2007, 2015    + HPV 56, 54,& 6, colp - TAL III, Leep =TAL II    Basal cell carcinoma     Congestive heart failure, unspecified HF chronicity, unspecified heart failure type (H) 06/22/2021    COPD (chronic obstructive pulmonary disease) (H)     Depressive disorder 07/2015    Heart attack (H)     History of blood transfusion     Hypertension     Immunosuppressed status (H)     due meds    Kidney replaced by transplant 09/2004    Living donor recipient,  Rejection 7/2005    LSIL (low grade squamous intraepithelial lesion) on Pap smear 04/2013    +HPV 33 or 45, 61      PAD (peripheral artery disease) (H)     PONV (postoperative nausea and vomiting)     Squamous cell lung cancer (H)     Thrombosis of leg 1967    Unspecified disorder of kidney and ureter     X-linked dominant Alport's syndrome.       Past Surgical History   Past Surgical History:   Procedure Laterality Date    BIOPSY      Kidney, Lung, Breast    BIOPSY ANAL N/A 03/14/2018    Procedure: BIOPSY ANAL;;  Surgeon: Shabbir Leo,  MD;  [facility]: [facility]    BYPASS GRAFT FEMORAL FEMORAL Bilateral 04/25/2021    Procedure: Axplantation infected graft, femoral to femoral bypass with cadaveric artery, bilateral SATORIUS MUSCLE FLAP CREATION, evacuation of absece, vacuum closure;  Surgeon: Raven Lewis MD;  [facility]    COLONOSCOPY      COLONOSCOPY N/A 08/09/2017    Procedure: COMBINED COLONOSCOPY, SINGLE OR MULTIPLE BIOPSY/POLYPECTOMY BY BIOPSY;;  Surgeon: Sushil Hyatt MD;  [facility] GI    COLONOSCOPY N/A 7/28/2022    Procedure: COLONOSCOPY, WITH BIOPSY;  Surgeon: Moise Corcoran MD;  Location:  GI    COLPOSCOPY,LOOP ELECTRD CERVIX EXCIS  03/11/2008    TAL II    CONIZATION LEEP  07/17/2013    Procedure: CONIZATION LEEP;;  Surgeon: Liliana Renteria MD;  [facility]    CONIZATION LEEP N/A 08/17/2016    Procedure: CONIZATION LEEP;  Surgeon: Liliana Renteria MD;  [facility]    CV CORONARY ANGIOGRAM N/A 04/06/2021    Procedure: CV CORONARY ANGIOGRAM;  Surgeon: Sincere Rosas MD;  Location:  HEART CARDIAC CATH LAB    CV CORONARY ANGIOGRAM N/A 04/25/2021    Procedure: Coronary Angiogram;  Surgeon: Sincere Rosas MD;  Location:  HEART CARDIAC CATH LAB    CV PCI STENT DRUG ELUTING N/A 04/06/2021    Procedure: Percutaneous Coronary Intervention Stent Drug Eluting;  Surgeon: Sincere Rosas MD;  Location:  HEART CARDIAC CATH LAB    ENDOVASCULAR REPAIR ANEURYSM AORTOILIAC Bilateral 04/06/2021    Procedure: Endovascular Abdominal Aortic Aneurysm Repair with Aortouniiliac Device and Femoral-Femoral Artery Bypass with [id number] Artegraft;  Surgeon: Abena Ferrara MD;  [facility]    ESOPHAGOSCOPY, GASTROSCOPY, DUODENOSCOPY (EGD), COMBINED N/A 2/2/2023    Procedure: ESOPHAGOGASTRODUODENOSCOPY, WITH BIOPSY;  Surgeon: Yazan Heredia MD;  Location:  GI    EXAM UNDER ANESTHESIA ANUS  07/15/2014    Procedure: EXAM UNDER ANESTHESIA ANUS;  Surgeon:  Radha Musa MD;  [facility]    EXAM UNDER ANESTHESIA ANUS N/A 03/14/2018    Procedure: EXAM UNDER ANESTHESIA ANUS;  Anal Exam Under Anesthesia With Excision of anal lesion, proctoscopy;  Surgeon: Shabbir Leo MD;  [facility]    EYE SURGERY      GENITOURINARY SURGERY      IR OR ANGIOGRAM  04/06/2021    IRRIGATION AND DEBRIDEMENT GROIN Right 04/24/2021    Procedure: IRRIGATION AND DEBRIDEMENT, INGUINAL REGION, I & D PF RIGHT GROIN;  Surgeon: Raven Lewis MD;  [facility]    IRRIGATION AND DEBRIDEMENT GROIN N/A 04/26/2021    Procedure: IRRIGATION AND DEBRIDEMENT, INGUINAL REGION, PLACEMENT OF VERAFLO WOUND VAC, WOUND WASHOUT,;  Surgeon: Raven Lewis MD;  [facility]    LASER CO2 EXCISE VULVA WIDE LOCAL  07/15/2014    Procedure: LASER CO2 EXCISE VULVA WIDE LOCAL;  Surgeon: Liliana Renteria MD;  [facility]    LASER CO2 VAGINA  07/17/2013    Procedure: LASER CO2 VAGINA;;  Surgeon: Liliana Renteria MD;  [facility]    LASER CO2 VAGINA N/A 09/25/2018    Procedure: LASER CO2 VAGINA;  Exam Under Anesthesia, CO2 Laser Ablation of Upper Vagina and Cervix;  Surgeon: Pati Garcia MD;  [facility]    MICROSCOPY ANAL  07/17/2013    Procedure: MICROSCOPY ANAL;  Anal Microscopy,  EUA vagina,Colposcopy Of Vagina And Vulva, Vaginal Biopsies, Omniguide Co2 Laser To Vagina and vulva, Loop Electrosurgical Excision Procedure To Cervix;  Surgeon: Radha Musa MD;  [facility]    MICROSCOPY ANAL  07/15/2014    Procedure: MICROSCOPY ANAL;  Surgeon: Radha Musa MD;  [facility]    PICC DOUBLE LUMEN PLACEMENT Right 04/30/2021    39cm, Basilic vein    PSEUDOANEURYSM REPAIR Right 6/27/2023    Procedure: RIGHT FEMORAL TO PROFUNDA FEMORIS ARTERY BYPASS WITH cadaveric femoral-popliteal artery, REPAIR OF RIGHT GROIN PSEUDOANEURYSM;  Surgeon: Abena Ferrara MD;  [facility]    TRANSESOPHAGEAL ECHOCARDIOGRAM INTRAOPERATIVE N/A 04/28/2021    Procedure:  ECHOCARDIOGRAM, TRANSESOPHAGEAL, INTRAOPERATIVE;  Surgeon: GENERIC ANESTHESIA PROVIDER;  [facility]: [facility]    VASCULAR SURGERY      Thrombectomy    [facility] NONSPECIFIC PROCEDURE      Thrombectomy    [facility] NONSPECIFIC PROCEDURE  1955 and 1959    Bilater eye surgery - correction for crossed eyes    [facility] NONSPECIFIC PROCEDURE  1998    oopherectomy L    [facility] NONSPECIFIC PROCEDURE  1967    open kidney biopsy - L    [facility] TRANSPLANTATION OF KIDNEY  09/2004    recipient -- done at [facility]       Prior to Admission Medications   Prior to Admission Medications   Prescriptions Last Dose Informant Patient Reported? Taking?   ASPIRIN LOW DOSE 81 MG chewable tablet 9/23/2023 at am Self No Yes   Sig: CHEW AND SWALLOW 1 TABLET (81 MG) BY MOUTH DAILY   Lactobacillus-Inulin (Xi'an 029ZP.comE DIGESTIVE HEALTH) CAPS 9/23/2023 at am Self No Yes   Sig: TAKE ONE CAPSULE BY MOUTH ONCE DAILY (DUE FOR PHYSICAL IN MARCH)   Nutritional Supplements (BOOST VERY HIGH CALORIE) LIQD   No No   Sig: Take 8 oz by mouth 2 times daily   Nutritional Supplements (ENSURE CLEAR) LIQD  Self No No   Sig: Take 1 Bottle by mouth daily   acetaminophen (TYLENOL) 325 MG tablet Unknown Self No Yes   Sig: Take 2 tablets (650 mg) by mouth every 4 hours as needed for other (For optimal non-opioid multimodal pain management to improve pain control.)   albuterol (PROAIR HFA/PROVENTIL HFA/VENTOLIN HFA) 108 (90 Base) MCG/ACT inhaler Unknown at prn Self No Yes   Sig: Inhale 1-2 puffs into the lungs every 6 hours as needed for shortness of breath, wheezing or cough   atorvastatin (LIPITOR) 80 MG tablet 9/23/2023 Self No Yes   Sig: Take 1 tablet (80 mg) by mouth daily   calcium carbonate-vitamin D (CALTRATE) 600-10 MG-MCG per tablet 9/23/2023 at am Self No Yes   Sig: TAKE ONE TABLET BY MOUTH TWICE A DAY   clopidogrel (PLAVIX) 75 MG tablet 9/23/2023 at am Self No Yes   Sig: Take 1 tablet (75 mg) by mouth daily   esomeprazole (NEXIUM) 40 MG DR capsule 9/23/2023 at am  No Yes   Sig: Take 1  capsule (40 mg) by mouth every morning (before breakfast) Take 30-60 minutes before eating.   hydrALAZINE (APRESOLINE) 10 MG tablet 2023 at am  Yes Yes   Sig: Take 10 mg by mouth 3 times daily   ipratropium-albuterol (COMBIVENT RESPIMAT)  MCG/ACT inhaler 2023 at am  Yes Yes   Sig: Inhale 1 puff into the lungs 4 times daily   isosorbide mononitrate (IMDUR) 60 MG 24 hr tablet 2023 at am Self No Yes   Sig: Take 1 tablet (60 mg) by mouth daily   metoprolol tartrate (LOPRESSOR) 100 MG tablet 2023 at am Self No Yes   Sig: Take 1 tablet (100 mg) by mouth daily   ondansetron (ZOFRAN ODT) 4 MG ODT tab 2023 at prn  No Yes   Sig: Take 1 tablet (4 mg) by mouth every 8 hours as needed for nausea   oxyCODONE (ROXICODONE) 5 MG tablet 2023 at am Self No Yes   Sig: Take 1 tablet (5 mg) by mouth every 4 hours as needed for severe pain   predniSONE (DELTASONE) 5 MG tablet 2023 at am Self No Yes   Sig: Take 1 tablet (5 mg) by mouth daily   psyllium (METAMUCIL/KONSYL) 58.6 % powder 2023 at am Self Yes Yes   Sig: Take by mouth every morning   tacrolimus (GENERIC EQUIVALENT) 0.5 MG capsule 2023 at am Self No Yes   Sig: Take 4 capsules (2 mg) by mouth 2 times daily   torsemide (DEMADEX) 10 MG tablet 2023 at am  Yes Yes   Sig: Take 10 mg by mouth daily as needed      Facility-Administered Medications: None        Social History   I have reviewed this patient's social history and updated it with pertinent information if needed.  Social History     Tobacco Use    Smoking status: Former     Packs/day: 0.30     Years: 35.00     Pack years: 10.50     Types: Cigarettes     Start date: 1967     Quit date: 3/1/2021     Years since quittin.5    Smokeless tobacco: Never    Tobacco comments:     States smokes once in a while   Vaping Use    Vaping Use: Never used   Substance Use Topics    Alcohol use: Not Currently     Comment: rarely    Drug use: Not Currently     Types: Marijuana      Comment: occasional use         Family History   I have reviewed this patient's family history and updated it with pertinent information if needed.  Family History   Problem Relation Age of Onset    Diabetes Father     Alcohol/Drug Father     Arthritis Father     Hypertension Father     Lipids Father         high cholesterol    Arthritis Mother     Diabetes Mother     Depression Mother     Heart Disease Mother     Neurologic Disorder Mother     Obesity Mother     Psychotic Disorder Mother     Thyroid Disease Mother     Hypertension Mother     Gynecology Sister         Precancerous cell removal from cervix at age 45    Depression Sister     Allergies Sister     Alcohol/Drug Sister     Neurologic Disorder Sister     Cerebrovascular Disease Paternal Grandmother     Diabetes Paternal Grandmother     Alcohol/Drug Son     Colon Polyps Sister     Breast Cancer Niece     Other Cancer Sister         Cervical    Obesity Sister     Depression Sister     Substance Abuse Son     Substance Abuse Sister             Depression Sister     Asthma Other     Colon Cancer No family hx of     Crohn's Disease No family hx of     Ulcerative Colitis No family hx of     Melanoma No family hx of     Skin Cancer No family hx of         Physical Exam   Vital Signs: Temp: 98.1  F (36.7  C) Temp src: Temporal BP: (!) 177/120 Pulse: 71   Resp: 16 SpO2: 93 % O2 Device: None (Room air)    Weight: 75 lbs 0 oz    Constitutional - alert, resting in bed, appears comfortable  Head - normocephalic, atraumatic  ENT - normal eye lids and lashes, no conjunctival hyperemia, no icterus, extraocular movements are normal, normal nose, no discharge, moist oral mucosa, no ulcers or exudates, normal external ear  Neck - no thyromegaly or lymphadenopathy. Tracheal is midline  CV - regular rate and rhythm, no murmurs, no edema  Pulmonary - lungs are clear to auscultation bilaterally, no wheezing or rhonchi  GI - abdomen is soft, non distended, non tender,  bowel sounds are present, no organomegaly  Neurological - alert and oriented, normal speech, no focal deficits  Musculoskeletal - no joint erythema or swelling, ROM is ok  Integumentary-skin is warm and dry, no rashes or ulcers          Medical Decision Making       65 MINUTES SPENT BY ME on the date of service doing chart review, history, exam, documentation & further activities per the note.      Data     I have personally reviewed the following data over the past 24 hrs:    12.9 (H)  \   10.0 (L)   / 282     131 (L) 97 (L) 60.1 (H) /  126 (H)   4.3 23 2.71 (H) \     ALT: 10 AST: 14 AP: 128 (H) TBILI: 0.4   ALB: 3.2 (L) TOT PROTEIN: 6.3 (L) LIPASE: 19     Trop: 39 (H) BNP: N/A     Procal: N/A CRP: 62.20 (H) Lactic Acid: 1.0       INR:  N/A PTT:  N/A   D-dimer:  16.08 (H) Fibrinogen:  N/A       Imaging results reviewed over the past 24 hrs:   Recent Results (from the past 24 hour(s))   CT Abdomen Pelvis w/o Contrast    Narrative    EXAM: CT ABDOMEN PELVIS W/O CONTRAST  LOCATION: [facility]  DATE: 9/23/2023    INDICATION: Severe epigastric pain.  COMPARISON: CT 06/06/2023.  TECHNIQUE: CT scan of the abdomen and pelvis was performed without IV contrast. Multiplanar reformats were obtained. Dose reduction techniques were used.  CONTRAST: None.    FINDINGS:   LOWER CHEST: Prominent aneurysmal dilatation of the descending thoracic aorta measuring approximately 6.0 cm in greatest radial dimension. Minute bilateral pleural effusions. Small pericardial effusion. Mild to moderate atelectasis most marked in the   right lower lobe.    HEPATOBILIARY: Normal.    PANCREAS: Slight calcification which may represent sequelae of chronic pancreatitis. Pancreas is otherwise normal with no changes of acute pancreatitis.    SPLEEN: Single tiny calcification which may represent a calcified granuloma.    ADRENAL GLANDS: Normal.    KIDNEYS/BLADDER: Marked atrophy of both kidneys with no hydronephrosis. Transplant  kidney in the left hemipelvis.    BOWEL: Moderate findings of diverticulosis with no evidence for diverticulitis. The appendix is normal.    LYMPH NODES: Normal.    VASCULATURE: There is an endograft seen involving the infrarenal abdominal aorta and extending into the left common iliac artery. There is aneurysmal dilatation seen of the abdominal aorta most marked in the upper abdominal aorta and this measures   approximately 5 cm in greatest AP dimension. No evidence for rupture. Prominence of the right groin soft tissues most typical for a postoperative seroma/pseudoaneurysm and this has decreased in size since 06/06/2023 which previously measured   approximately 4 cm in greatest radial dimension.    PELVIC ORGANS: Normal.    MUSCULOSKELETAL: Moderate degenerative changes most marked at the L5-S1 interspace.      Impression    IMPRESSION:   1.  No significant changes since 06/06/2023 with again seen significant areas of aneurysmal dilatation most pronounced in the descending thoracic aorta. Postoperative changes to include an endograft.    2.  Findings most typical for a partially resolved seroma/pseudoaneurysm in the right groin.    3.  Advanced atrophy of the native kidneys with transplant kidney seen in the left hemipelvis.    4.  Tiny pericardial effusion.    5.  Tiny bilateral pleural effusions with slight atelectasis.

## 2023-09-24 NOTE — PROVIDER NOTIFICATION
MD Notification    Notified Person: MD    Notified Person Name:  Francois     Notification Date/Time: 9/24/23 @ 05:22 am    Notification Interaction: Vocera/messaging     Purpose of Notification: Could you place an order for Covid and Enteric tests? Thanks, HAMMAD RN. [phone].    Orders Received:    Comments:

## 2023-09-24 NOTE — ED NOTES
[facility]  ED Nurse Handoff Report    ED Chief complaint: Abdominal Pain      ED Diagnosis:   Final diagnoses:   None       Code Status: to be addressed    Allergies:   Allergies   Allergen Reactions    Blood Transfusion Related (Informational Only) Other (See Comments)     Patient has a history of a clinically significant antibody against RBC antigens.  A delay in compatible RBCs may occur.    Tramadol-Acetaminophen Nausea and Vomiting and Hives    Hydrocodone Nausea and Vomiting and Hives       Patient Story:Maribel Yang is an anticoagulated 72 year old female with a history of congestive heart failure and CKD who presents with constant severe abdominal pain for days. She is unable to sleep at night due to her symptoms. She has not been able to eat for days. She can only eat a few bites due to the pain. She was told her creatine and other labs were abnormal this morning as well. Her pain is alleviated a little with her knees bent, but is constant.     Focused Assessment:  abdominal pain    Treatments and/or interventions provided: labs,ct  Patient's response to treatments and/or interventions: ongoing    To be done/followed up on inpatient unit:  .    Does this patient have any cognitive concerns?:  none    Activity level - Baseline/Home:  Walker  Activity Level - Current:   Stand with assist x2    Patient's Preferred language: English   Needed?: No    Isolation: None and Enteric  Infection: Not Applicable  C-Diff Pending  Patient tested for COVID 19 prior to admission: NO, she had covid 8/30/2023  Bariatric?: No    Vital Signs:   Vitals:    09/23/23 1700 09/23/23 1900 09/23/23 1915 09/23/23 1930   BP: (!) 165/117 (!) 177/120     Pulse: 73 71     Resp: 16 16     Temp:       TempSrc:       SpO2: 96% 93% 93% 93%   Weight:       Height:           Cardiac Rhythm:     Was the PSS-3 completed:   Yes  What interventions are required if any?               Family Comments: family at  bedside  OBS brochure/video discussed/provided to patient/family: No              Name of person given brochure if not patient: .              Relationship to patient: .    For the majority of the shift this patient's behavior was Green.   Behavioral interventions performed were none.    ED NURSE PHONE NUMBER: [phone]

## 2023-09-24 NOTE — PROGRESS NOTES
Observation goals  PRIOR TO DISCHARGE       Comments: -diagnostic tests and consults completed and resulted- Not met  -vital signs normal or at patient baseline- Partially met  -tolerating oral intake to maintain hydration-Partially met  -adequate pain control on oral analgesics-Partially met  Nurse to notify provider when observation goals have been met and patient is ready for discharge.

## 2023-09-25 PROBLEM — R10.9 ABDOMINAL PAIN, UNSPECIFIED ABDOMINAL LOCATION: Status: ACTIVE | Noted: 2023-01-01

## 2023-09-25 NOTE — PROGRESS NOTES
Observation goals  PRIOR TO DISCHARGE        Comments: -diagnostic tests and consults completed and resulted not met  -vital signs normal or at patient baseline met  -tolerating oral intake to maintain hydration met  -adequate pain control on oral analgesics met  Nurse to notify provider when observation goals have been met and patient is ready for discharge.

## 2023-09-25 NOTE — PROGRESS NOTES
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     She was recently hospitalized at [facility] from 9/5-9/10 due to COVID-19 pneumonitis with respiratory failure.  Reports she started experiencing mild abdominal discomfort while she was hospitalized.  Abdominal pain worsened on day of admission.  No nausea or vomiting, no fever or chills, no diarrhea or hematochezia.  Reports constipation, anorexia.       CT A/P showed prominent aneurysmal dilatation of descending thoracic aorta measuring 6 cm, small pericardial effusion, mild to moderate atelectasis, slight calcification in pancreas which could represent chronic pancreatitis.  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  Moderate diverticulosis without diverticulitis.  No cause to explain abdominal pain was seen on CT scan.        Mid abdominal pain-unclear etiology  - Has mild leukocytosis of 12.9>11.5, elevated CRP of 62 (up from 22 two weeks ago), Lactate normal at 1.  Epigastric tenderness on exam.  LFTs at baseline.  Lipase normal  Pain is worse at night.   - CT scan did not show any cause to explain the pain  - Last EGD was in February 2023. Largely unremarkable   - Last colonoscopy was in July 2022 that showed moderately congested mucosa in transverse colon, ascending colon and cecum, diffuse mild inflammation in ascending colon and cecum, diverticulosis of sigmoid colon.  Biopsies showed chronic inflammation and patchy fibrosis.  *during admission [facility] 8/30 had neg enteric panel, positive C.diff but antigen test negative  suggestive of colonization. PCP had ordered repeat Cdiff at outpatient visit 9/23, stool not collected  *Cause of abdominal pain is unclear.  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  Recent tacrolimus level was elevated at 8.3 on 9/7.   *Follows up with transplant nephrology  *Baseline creatinine up to 1.9.  Creatinine on admission elevated at 2.7  Cr remains 2.64  -Continue holding torsemide  -she has been on NS @75/hr since 9/24  - Continue prednisone 5 mg daily   --tacrolimus level 9/24 elevated at 13 though not clear trough, will hold until Nephrology weighs in per pharmacy rec   --repeat tacrolimus level pending      Hypomagnesemia, magnesium 1.4  - Administer 2 g IV magnesium sulfate  -Serum magnesium level improved to 2.5     Coronary artery disease, s/p inferior STEMI, s/p RCA stenting, 4/2021  Chronic elevation of troponin  Chronic diastolic CHF with recovered EF  Essential hypertension  - On DAPT, Imdur, hydralazine, metoprolol, statin  - Troponin elevated at 40, 39.  This is her baseline.  Trend is flat  -Clinically euvolemic.  -Continue aspirin, Plavix, atorvastatin, hydralazine 10 mg 3 times daily, Imdur, metoprolol 100 mg daily     Recent COVID-19 infection with bilateral pneumonitis with acute hypoxic respiratory failure  -Tested positive on 8/30.  Hospitalized at [facility] from 9/5-9/10.  Treated with IV remdesivir and dexamethasone.  Was discharged on room air.  -currently continues with oxygen use for comfort, sats 92% on room air   -Isolation precautions are not needed     Generalized weakness  -During recent stay at [facility], PT OT expressed concerns about patient returning home and her ability to complete ADLs.  Patient declined home PT.  reports she was able to do ADLs until day prior to admission   -Consult PT     Small pericardial effusion without tamponade  -Limited echo 9/5 showed EF 50-55% normal RV function, mild-moderate pericardial effusion of 1.6 cm without evidence of tamponade.  -CT scan on admission mentioned tiny pericardial effusion  -outpatient follow up     Peripheral vascular disease  Aneurysmal dilation of the ascending thoracic aorta measuring 6 cm  AAA, S/p endovascular aneurysm repair and femorofemoral bypass, 4/2021  S/p right femoral to profunda femoris artery bypass and repair of right femoral pseudoaneurysm, 6/2023  -Followed by vascular surgery  - continue asa, statin      Small cell carcinoma of right lung, s/p SBRT, 2014  Squamous cell carcinoma of anus, s/p full-thickness excision and chemoradiation, 2018  -Stable     Anemia of chronic renal disease  -Hemoglobin stable ~10.     COPD  -No acute exacerbation  -Continue Combivent inhaler 4 times daily       Diet: Snacks/Supplements Adult: Ensure Enlive; With Meals  Advance Diet as Tolerated: Full Liquid Diet    DVT Prophylaxis: Pneumatic Compression Devices, already on DAPT   Villavicencio Catheter: Not present  Lines: None     Cardiac Monitoring: None  Code Status: Full Code      Clinically Significant Risk Factors Present on Admission            # Hypomagnesemia: Lowest Mg = 1.4 mg/dL in last 2 days, will replace as needed   # Hypoalbuminemia: Lowest albumin = 3.2 g/dL at 9/23/2023 11:43 AM, will monitor as appropriate   # Drug Induced Platelet Defect: home medication list includes an antiplatelet medication  # Acute Kidney Injury, unspecified: based on a >150% or 0.3 mg/dL increase in last creatinine compared to past 90 day average, will monitor renal function  # Hypertension: Noted on problem list  # Chronic heart failure with preserved ejection fraction: heart failure noted on problem list and last echo with EF >50%     # Cachexia: Estimated body mass index is 16.44 kg/m  as  "calculated from the following:    Height as of this encounter: 1.549 m (5' 1\").    Weight as of this encounter: 39.5 kg (87 lb).       # COPD: noted on problem list        Disposition Plan      Expected Discharge Date: 09/25/2023        Discharge Comments: PT recommends TCU vs home wit assist and home PT  pt denies TCU would like to go home. Per PT pt should have 24/7 home assist and PT to improve mobility/strengthening  [facility] accepted referal        The patient's care was discussed with Dr. Larkin who agrees with the above plan     Natalie Rm PA-C  Hospitalist Service  [facility]  Securely message with [employer] (more info)  Text page via [employer] Paging/Directory   ______________________________________________________________________    Interval History   Feels abdominal pain a little better than time of admission but still gets quite severe at night necessitating oxy use. No dyspnea at rest but feels short of breath when laying flat for \"years\" no significant cough. Feels much better with oxygen on. No nausea or vomiting. No BM since Friday.     Physical Exam   Temp: 98.3  F (36.8  C) Temp src: Oral BP: (!) 145/95 Pulse: 83   Resp: 16 SpO2: 92 % O2 Device: None (Room air) Oxygen Delivery: 4 LPM  Vitals:    09/23/23 1545 09/24/23 0331 09/25/23 0711   Weight: 34 kg (75 lb) 37.3 kg (82 lb 4.8 oz) 39.5 kg (87 lb)     Vital Signs with Ranges  Temp:  [97.7  F (36.5  C)-98.3  F (36.8  C)] 98.3  F (36.8  C)  Pulse:  [71-83] 83  Resp:  [16] 16  BP: (114-145)/(79-95) 145/95  SpO2:  [92 %-100 %] 92 %  I/O last 3 completed shifts:  In: 1870 [P.O.:1270; I.V.:600]  Out: -     Constitutional: Alert and oriented, laying down in bed. Cachectic appearing. Appears comfortable. Answers questions appropriately.   ENT: moist mucous membranes  Respiratory: Lungs clear to auscultation bilaterally, no increased work of breathing  Cardiovascular: Regular rate and rhythm, no LE edema   GI: " positive bowel sounds, abdomen soft, non-distended. Tender to palpation in epigastric area   MSK:  moves all four extremities.       Medical Decision Making       65 MINUTES SPENT BY ME on the date of service doing chart review, history, exam, documentation & further activities per the note.      Data         Imaging results reviewed over the past 24 hrs:   No results found for this or any previous visit (from the past 24 hour(s)).

## 2023-09-25 NOTE — PLAN OF CARE
Goal Outcome Evaluation:  Top portion must ALWAYS be completed    PRIMARY Concern: Abd pain    SAFETY RISK Concerns (fall risk, behaviors, etc.): Fall        Tests/Procedures for NEXT shift: None    Consults? (Pending/following, signed-off?) GI  following    Where is patient from? (Home, TCU, etc.): Home    Other Important info for NEXT shift: Kidney transplant 19 years ago    Anticipated DC date & active delays: CM following for placemet  _____________________________________________________________________________    SUMMARY NOTE:                Orientation/Cognitive: A&O, flat affect    Observation Goals (Met/ Not Met): N    Mobility Level/Assist Equipment: Ax1 GB/W    Pain Management: PRN oxycodone for abd pain    Tele/VS/O2: VSS on 3L NC    ABNL Lab/BG: None    Diet:FLD    Bowel/Bladder: Continent    Skin Concerns: None    Drains/Devices: PIV infusing IVF

## 2023-09-25 NOTE — TELEPHONE ENCOUNTER
[facility] Call Center    Phone Message    May a detailed message be left on voicemail: no     Reason for Call: Other: Patient calling regarding her video visit with Mayi today. Patient is in the hospital and declined to reschedule. Please advise. Thank you     Action Taken: Message routed to:  Other: Neph    Travel Screening: Not Applicable

## 2023-09-25 NOTE — PLAN OF CARE
Reason for Admission: abdominal pain of unclear source, nausea and vomiting    Cognitive Concerns/ Orientation : Alert and oriented x4  BEHAVIOR & AGGRESSION TOOL COLOR: Green  Neuro/CMS intact except generalized weakness  VS/O2: stable on room air  CARDIOPULMONARY: denies chest pain and SOB. L/s diminished   MOBILITY: Up Ax1 walker+GB. ambulated  PAIN MANAGMENT: mild abdominal pain 3-4/10. Declined pain medications  DIET: advanced to low fiber diet.   BOWEL/BLADDER: continent  ABNL LAB/BG: creatinine 2.64, sodium 133, Hgb 9.1. WBC 11.5  DRAIN/DEVICES: PIV infusing NS 75ml/hr  TELEMETRY RHYTHM: None  SKIN: blanchable redness to sacrum-applied preventative mepilex  TESTS/PROCEDURES: None  D/C DATE and PLACEMENT: TBD    OTHER IMPORTANT INFO and PLAN: Tolerating low fiber diet. No nausea. Gave prn Suppository. No result yet. +BS. Passing gas. Mild edema to BLE. Seen by nephrology.

## 2023-09-25 NOTE — PLAN OF CARE
Goal Outcome Evaluation:      Top portion must ALWAYS be completed  PRIMARY Concern: abd pain  SAFETY RISK Concerns (fall risk, behaviors, etc.): fall      Tests/Procedures for NEXT shift: n  Consults? (Pending/following, signed-off?) n  Where is patient from? (Home, TCU, etc.): home  Other Important info for NEXT shift: n  Anticipated DC date & active delays: tbd  _____________________________________________________________________________  SUMMARY NOTE:              (either paste note here OR complete the info below- RN to choose one- delete info below if not used)  Orientation/Cognitive: alert and oriented  Observation Goals (Met/ Not Met): n  Mobility Level/Assist Equipment: gb, walker and belt  Antibiotics & Plan (IV/po, length of tx left): n  Pain Management: n  Tele/VS/O2: vss, oxygen 3liters nasel cannula  ABNL Lab/BG: labs off  Diet: full liquids  Bowel/Bladder: continent  Skin Concerns: n  Drains/Devices: n  Patient Stated Goal for Today: y

## 2023-09-25 NOTE — CONSULTS
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     Assessment:  AMY, nonoliguric  Recent COVID infection with poor p.o. intake, poor fluid intake and general decline.  Suspect some prerenal component, currently receiving NS at 75 mL/h.  Continues to appear hypovolemic, would continue fluids.  Discharged recently at a creatinine of 2.1 in the setting of elevated Tac trough and likely some CNI toxicity.  See below for tacrolimus dosing. CT abdomen with normal-appearing transplanted kidney.  She denies any urinary symptoms.  No UA done this admission, will obtain given her AMY.  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  Baseline creatinine 1.6-1.9, however after recent admission discharged with a creatinine of 2.1.  During recent admission notably had elevated Tac level, dose was decreased to 1.5 mg twice daily.  Patient reports she was taking 2 mg twice daily at home, unaware of this change.  Has been receiving 2 mg twice daily while here.  Tac level on 9/24 elevated at 13, however was not a true trough.  We will plan to hold 2 doses of Tac today, and resume tomorrow at 1.5 mg twice daily.  -Hold tacrolimus a.m. and p.m. dose today  -Tomorrow resume tacrolimus 1.5 mg twice daily  -In 2 to 3 days we will check another Tac trough to ensure she is not supratherapeutic (goal 4-6)   -Continue PTA  prednisone 5 mg daily    Abdominal pain  Patient constipated.  CT abdomen pelvis without any acute pathology.  Not entirely clear source of abdominal pain besides constipation.  Work-up and treatment per primary and GI.    Hypomagnesemia  Replace as needed.    CAD  Chronic diastolic CHF  HTN  Continue PTA Imdur hydralazine metoprolol.    Small pericardial effusion without tamponade  Noted on TTE from previous admission.  CT this admission with tiny pericardial effusion.  No evidence of tamponade at this time.    PVD  AAA s/p endovascular aneurysm repair and femorofemoral bypass  S/p right femoral to profunda femoris artery bypass and repair of right femoral pseudoaneurysm    Small cell carcinoma right lung  Squamous cell carcinoma of anus s/p full-thickness excision and chemoradiation    Anemia of renal disease   Hemoglobin stable at 10.    COPD  Recent exacerbation during last hospitalization.  No wheezing this admission.    Reviewed notes from hospitalist, GI, RNs.    Chiki Peterson MD   [facility] - Nephrology  [phone]  --------------------------------------------------------------------------------------------  Reason for Consult     I was asked to see the patient for AMY and renal transplant.    Primary Care Physician     Lisa Martinez    Chief Complaint     Abdominal pain    History is obtained from the patient and chart review.      History of Present Illness     Maribel Yang is a 72 year old female who presents with abdominal pain.  Patient had 2 recent admissions for COVID, poor p.o. intake, AMY, hypoxic respiratory failure.  These were both at the [facility].  Her most recent discharge was 9/10.  She discharged to home, states that she just still has not felt well.  Her abdominal pain is what brought her in this admission.  She notes that at home she was taking all meds as normal, she was taking tacrolimus 2 mg twice daily, did not realize that they had reduced her dose  to 1.5 mg twice daily.  She notes poor p.o. intake, difficulty drinking enough fluids.  Dry mouth.  She has some increased work of breathing, but much improved from previous admissions.  She denies nausea or vomiting.  Appetite not great.  She has been urinating normally without issue.  She has not had a bowel movement in days.  She notes all over abdominal pain and bloating.  She denies fevers or chills.  She just feels worn out, lack of energy, malaise.    In ED, vitals were normal.  She was afebrile.  She underwent general labs, had low magnesium of 1.4, creatinine elevated to 2.7.  She had a CT abdomen pelvis that did not have acute abdominal pathology.  No evidence of hydronephrosis noted on transplanted kidney.  She was started on normal saline.  I consulted for abdominal pain, thought to be possibly related to constipation.    Past Medical History   I have reviewed this patient's medical history and updated it with pertinent information if needed.   Past Medical History:   Diagnosis Date    Abnormal coagulation profile     p [id number] heterozygote     Age-related osteoporosis without current pathological fracture 06/22/2019    Anemia     Antiplatelet or antithrombotic long-term use     ASCUS with positive high risk HPV 2007, 2015    + HPV 56, 54,& 6, colp - TAL III, Leep =TAL II    Basal cell carcinoma     Congestive heart failure, unspecified HF chronicity, unspecified heart failure type (H) 06/22/2021    COPD (chronic obstructive pulmonary disease) (H)     Depressive disorder 07/2015    Heart attack (H)     History of blood transfusion     Hypertension     Immunosuppressed status (H)     due meds    Kidney replaced by transplant 09/2004    Living donor recipient,  Rejection 7/2005    LSIL (low grade squamous intraepithelial lesion) on Pap smear 04/2013    +HPV 33 or 45, 61      PAD (peripheral artery disease) (H)     PONV (postoperative nausea and vomiting)     Squamous cell lung cancer (H)     Thrombosis of  leg 1967    Unspecified disorder of kidney and ureter     X-linked dominant Alport's syndrome.       Past Surgical History   I have reviewed this patient's surgical history and updated it with pertinent information if needed.  Past Surgical History:   Procedure Laterality Date    BIOPSY      Kidney, Lung, Breast    BIOPSY ANAL N/A 03/14/2018    Procedure: BIOPSY ANAL;;  Surgeon: Shabbir Leo MD;  [facility]    BYPASS GRAFT FEMORAL FEMORAL Bilateral 04/25/2021    Procedure: Axplantation infected graft, femoral to femoral bypass with cadaveric artery, bilateral SATORIUS MUSCLE FLAP CREATION, evacuation of absece, vacuum closure;  Surgeon: Raven Lewis MD;  [facility]    COLONOSCOPY      COLONOSCOPY N/A 08/09/2017    Procedure: COMBINED COLONOSCOPY, SINGLE OR MULTIPLE BIOPSY/POLYPECTOMY BY BIOPSY;;  Surgeon: Sushil Hyatt MD;  [facility]: [facility] GI    COLONOSCOPY N/A 7/28/2022    Procedure: COLONOSCOPY, WITH BIOPSY;  Surgeon: Moise Corcoran MD;  Location:  GI    COLPOSCOPY,LOOP ELECTRD CERVIX EXCIS  03/11/2008    TAL II    CONIZATION LEEP  07/17/2013    Procedure: CONIZATION LEEP;;  Surgeon: Liliana Renteria MD;  [facility]    CONIZATION LEEP N/A 08/17/2016    Procedure: CONIZATION LEEP;  Surgeon: Liliana Renteria MD;  [facility]    CV CORONARY ANGIOGRAM N/A 04/06/2021    Procedure: CV CORONARY ANGIOGRAM;  Surgeon: Sincere Rosas MD;  Location:  HE[facility] CARDIAC CATH LAB    CV CORONARY ANGIOGRAM N/A 04/25/2021    Procedure: Coronary Angiogram;  Surgeon: Sincere Rosas MD;  Location:  HE[facility] CARD[facility]AC CATH LAB    CV PCI STENT DRUG ELUTING N/A 04/06/2021    Procedure: Percutaneous Coronary Intervention Stent Drug Eluting;  Surgeon: Sincere Rosas MD;  Location:  HEART CARDIAC CATH LAB    ENDOVASCULAR REPAIR ANEURYSM AORTOILIAC Bilateral 04/06/2021    Procedure: Endovascular Abdominal Aortic Aneurysm Repair with Aortouniiliac  Device and Femoral-Femoral Artery Bypass with [id number] Artegraft;  Surgeon: Abena Ferrara MD;  [facility]    ESOPHAGOSCOPY, GASTROSCOPY, DUODENOSCOPY (EGD), COMBINED N/A 2/2/2023    Procedure: ESOPHAGOGASTRODUODENOSCOPY, WITH BIOPSY;  Surgeon: Yazan Heredia MD;  Location: [facility]    EXAM UNDER ANESTHESIA ANUS  07/15/2014    Procedure: EXAM UNDER ANESTHESIA ANUS;  Surgeon: Radha Musa MD;  [facility]    EXAM UNDER ANESTHESIA ANUS N/A 03/14/2018    Procedure: EXAM UNDER ANESTHESIA ANUS;  Anal Exam Under Anesthesia With Excision of anal lesion, proctoscopy;  Surgeon: Shabbir Leo MD;  [facility]    EYE SURGERY      GENITOURINARY SURGERY      IR OR ANGIOGRAM  04/06/2021    IRRIGATION AND DEBRIDEMENT GROIN Right 04/24/2021    Procedure: IRRIGATION AND DEBRIDEMENT, INGUINAL REGION, I & D PF RIGHT GROIN;  Surgeon: Raven Lewis MD;  [facility]    IRRIGATION AND DEBRIDEMENT GROIN N/A 04/26/2021    Procedure: IRRIGATION AND DEBRIDEMENT, INGUINAL REGION, PLACEMENT OF VERAFLO WOUND VAC, WOUND WASHOUT,;  Surgeon: Raven Lewis MD;  [facility]    LASER CO2 EXCISE VULVA WIDE LOCAL  07/15/2014    Procedure: LASER CO2 EXCISE VULVA WIDE LOCAL;  Surgeon: Liliana Renteria MD;  [facility]    LASER CO2 VAGINA  07/17/2013    Procedure: LASER CO2 VAGINA;;  Surgeon: Liliana Renteria MD;  [facility]    LASER CO2 VAGINA N/A 09/25/2018    Procedure: LASER CO2 VAGINA;  Exam Under Anesthesia, CO2 Laser Ablation of Upper Vagina and Cervix;  Surgeon: Pati Garcia MD;  [facility]    MICROSCOPY ANAL  07/17/2013    Procedure: MICROSCOPY ANAL;  Anal Microscopy,  EUA vagina,Colposcopy Of Vagina And Vulva, Vaginal Biopsies, Omniguide Co2 Laser To Vagina and vulva, Loop Electrosurgical Excision Procedure To Cervix;  Surgeon: Radha Musa MD;  [facility]    MICROSCOPY ANAL  07/15/2014    Procedure: MICROSCOPY ANAL;  Surgeon: Radha Musa MD;   [facility]    PICC DOUBLE LUMEN PLACEMENT Right 04/30/2021    39cm, Basilic vein    PSEUDOANEURYSM REPAIR Right 6/27/2023    Procedure: RIGHT FEMORAL TO PROFUNDA FEMORIS ARTERY BYPASS WITH cadaveric femoral-popliteal artery, REPAIR OF RIGHT GROIN PSEUDOANEURYSM;  Surgeon: Abena Ferrara MD;  [facility]    TRANSESOPHAGEAL ECHOCARDIOGRAM INTRAOPERATIVE N/A 04/28/2021    Procedure: ECHOCARDIOGRAM, TRANSESOPHAGEAL, INTRAOPERATIVE;  Surgeon: GENERIC ANESTHESIA PROVIDER;  Location: [facility]    VASCULAR SURGERY      Thrombectomy    [facility] NONSPECIFIC PROCEDURE      Thrombectomy    [facility] NONSPECIFIC PROCEDURE  1955 and 1959    Bilater eye surgery - correction for crossed eyes    [facility] NONSPECIFIC PROCEDURE  1998    oopherectomy L    [facility] NONSPECIFIC PROCEDURE  1967    open kidney biopsy - L    [facility] TRANSPLANTATION OF KIDNEY  09/2004    recipient [facility]        Prior to Admission Medications   Prior to Admission Medications   Prescriptions Last Dose Informant Patient Reported? Taking?   ASPIRIN LOW DOSE 81 MG chewable tablet 9/23/2023 at am Self No Yes   Sig: CHEW AND SWALLOW 1 TABLET (81 MG) BY MOUTH DAILY   Lactobacillus-Inulin ([facility] DIGESTIVE HEALTH) CAPS 9/23/2023 at am Self No Yes   Sig: TAKE ONE CAPSULE BY MOUTH ONCE DAILY (DUE FOR PHYSICAL IN MARCH)   Nutritional Supplements (BOOST VERY HIGH CALORIE) LIQD   No No   Sig: Take 8 oz by mouth 2 times daily   Nutritional Supplements (ENSURE CLEAR) LIQD  Self No No   Sig: Take 1 Bottle by mouth daily   acetaminophen (TYLENOL) 325 MG tablet Unknown Self No Yes   Sig: Take 2 tablets (650 mg) by mouth every 4 hours as needed for other (For optimal non-opioid multimodal pain management to improve pain control.)   albuterol (PROAIR HFA/PROVENTIL HFA/VENTOLIN HFA) 108 (90 Base) MCG/ACT inhaler Unknown at prn Self No Yes   Sig: Inhale 1-2 puffs into the lungs every 6 hours as needed for shortness of breath, wheezing or cough   atorvastatin (LIPITOR) 80 MG tablet  9/23/2023 Self No Yes   Sig: Take 1 tablet (80 mg) by mouth daily   calcium carbonate-vitamin D (CALTRATE) 600-10 MG-MCG per tablet 9/23/2023 at am Self No Yes   Sig: TAKE ONE TABLET BY MOUTH TWICE A DAY   clopidogrel (PLAVIX) 75 MG tablet 9/23/2023 at am Self No Yes   Sig: Take 1 tablet (75 mg) by mouth daily   esomeprazole (NEXIUM) 40 MG DR capsule 9/23/2023 at am  No Yes   Sig: Take 1 capsule (40 mg) by mouth every morning (before breakfast) Take 30-60 minutes before eating.   hydrALAZINE (APRESOLINE) 10 MG tablet 9/23/2023 at am  Yes Yes   Sig: Take 10 mg by mouth 3 times daily   ipratropium-albuterol (COMBIVENT RESPIMAT)  MCG/ACT inhaler 9/23/2023 at am  Yes Yes   Sig: Inhale 1 puff into the lungs 4 times daily   isosorbide mononitrate (IMDUR) 60 MG 24 hr tablet 9/23/2023 at am Self No Yes   Sig: Take 1 tablet (60 mg) by mouth daily   metoprolol tartrate (LOPRESSOR) 100 MG tablet 9/23/2023 at am Self No Yes   Sig: Take 1 tablet (100 mg) by mouth daily   ondansetron (ZOFRAN ODT) 4 MG ODT tab 9/22/2023 at prn  No Yes   Sig: Take 1 tablet (4 mg) by mouth every 8 hours as needed for nausea   oxyCODONE (ROXICODONE) 5 MG tablet 9/23/2023 at am Self No Yes   Sig: Take 1 tablet (5 mg) by mouth every 4 hours as needed for severe pain   predniSONE (DELTASONE) 5 MG tablet 9/23/2023 at am Self No Yes   Sig: Take 1 tablet (5 mg) by mouth daily   psyllium (METAMUCIL/KONSYL) 58.6 % powder 9/23/2023 at am Self Yes Yes   Sig: Take by mouth every morning   tacrolimus (GENERIC EQUIVALENT) 0.5 MG capsule 9/23/2023 at am Self No Yes   Sig: Take 4 capsules (2 mg) by mouth 2 times daily   torsemide (DEMADEX) 10 MG tablet 9/23/2023 at am  Yes Yes   Sig: Take 10 mg by mouth daily as needed      Facility-Administered Medications: None     Allergies   Allergies   Allergen Reactions    Blood Transfusion Related (Informational Only) Other (See Comments)     Patient has a history of a clinically significant antibody against RBC  antigens.  A delay in compatible RBCs may occur.    Tramadol-Acetaminophen Nausea and Vomiting and Hives    Hydrocodone Nausea and Vomiting and Hives       Social History   I have reviewed this patient's social history and updated it with pertinent information if needed. Maribel Yang  reports that she quit smoking about 2 years ago. Her smoking use included cigarettes. She started smoking about 56 years ago. She has a 10.50 pack-year smoking history. She has never used smokeless tobacco. She reports that she does not currently use alcohol. She reports that she does not currently use drugs after having used the following drugs: Marijuana.    Family History   I have reviewed this patient's family history and updated it with pertinent information if needed.   Family History   Problem Relation Age of Onset    Diabetes Father     Alcohol/Drug Father     Arthritis Father     Hypertension Father     Lipids Father         high cholesterol    Arthritis Mother     Diabetes Mother     Depression Mother     Heart Disease Mother     Neurologic Disorder Mother     Obesity Mother     Psychotic Disorder Mother     Thyroid Disease Mother     Hypertension Mother     Gynecology Sister         Precancerous cell removal from cervix at age 45    Depression Sister     Allergies Sister     Alcohol/Drug Sister     Neurologic Disorder Sister     Cerebrovascular Disease Paternal Grandmother     Diabetes Paternal Grandmother     Alcohol/Drug Son     Colon Polyps Sister     Breast Cancer Niece     Other Cancer Sister         Cervical    Obesity Sister     Depression Sister     Substance Abuse Son     Substance Abuse Sister             Depression Sister     Asthma Other     Colon Cancer No family hx of     Crohn's Disease No family hx of     Ulcerative Colitis No family hx of     Melanoma No family hx of     Skin Cancer No family hx of        Review of Systems   The 10 point Review of Systems is negative other than noted in the HPI.      Physical Exam   Temp: 98.3  F (36.8  C) Temp src: Oral BP: (!) 145/95 Pulse: 83   Resp: 16 SpO2: 92 % O2 Device: None (Room air) Oxygen Delivery: 4 LPM  Vital Signs with Ranges  Temp:  [97.7  F (36.5  C)-98.3  F (36.8  C)] 98.3  F (36.8  C)  Pulse:  [71-83] 83  Resp:  [16] 16  BP: (114-145)/(79-95) 145/95  SpO2:  [92 %-100 %] 92 %  87 lbs 0 oz    GENERAL: NAD, frail and weak appearing  HEENT:  Normocephalic.  Dry mucous membranes  CV: RRR, no murmurs  RESP: Clear bilaterally with good efforts  MUSCULOSKELETAL: extremities nl - no gross deformities noted; 1+ edema in bilateral feet  SKIN: no suspicious lesions or rashes, dry to touch  NEURO: Diffusely weak, alert, oriented, normal speech  PSYCH: mood good, affect appropriate      Data   BMP  Recent Labs   Lab 09/24/23  0651 09/23/23  1143   * 131*   POTASSIUM 4.9 4.3   CHLORIDE 99 97*   KAYKAY 8.7* 9.3   CO2 22 23   BUN 56.9* 60.1*   CR 2.64* 2.71*   GLC 88 126*     Phos@[id number](phos:4)  CBC)  Recent Labs   Lab 09/24/23  0651 09/23/23  1143   WBC 11.5* 12.9*   HGB 9.1* 10.0*   HCT 28.7* 30.4*   MCV 83 80    282     Recent Labs   Lab 09/23/23  1143   AST 14   ALT 10   ALKPHOS 128*   BILITOTAL 0.4     No lab results found in last 7 days.  25 OH Vit D2   Date Value Ref Range Status   06/12/2019 <5 ug/L Final     25 OH Vit D3   Date Value Ref Range Status   06/12/2019 39 ug/L Final     25 OH Vit D total   Date Value Ref Range Status   06/12/2019 <44 20 - 75 ug/L Final     Comment:     Season, race, dietary intake, and treatment affect the concentration of   25-hydroxy-Vitamin D. Values may decrease during winter months and increase   during summer months. Values 20-29 ug/L may indicate Vitamin D insufficiency   and values <20 ug/L may indicate Vitamin D deficiency.  This test was developed and its performance characteristics determined by the   [facility],  Special [facility] Laboratory. It has   not been cleared or approved by  the FDA. The laboratory is regulated under   CLIA as qualified to perform high-complexity testing. This test is used for   clinical purposes. It should not be regarded as investigational or for   research.       Recent Labs   Lab 09/24/23  0651   HGB 9.1*   HCT 28.7*   MCV 83     No results for input(s): PTHI in the last 168 hours.  Color Urine (no units)   Date Value   08/31/2023 Light Yellow   06/23/2021 Straw     Appearance Urine (no units)   Date Value   08/31/2023 Clear   06/23/2021 Clear     Glucose Urine (mg/dL)   Date Value   08/31/2023 Negative   06/23/2021 Negative     Bilirubin Urine (no units)   Date Value   08/31/2023 Negative   06/23/2021 Negative     Ketones Urine (mg/dL)   Date Value   08/31/2023 Negative   06/23/2021 Negative     Specific Gravity Urine (no units)   Date Value   08/31/2023 1.013   06/23/2021 1.010     pH Urine   Date Value   08/31/2023 6.0   06/23/2021 6.5 pH     Protein Albumin Urine (mg/dL)   Date Value   08/31/2023 100 (A)   06/23/2021 Negative     Urobilinogen Urine (EU/dL)   Date Value   02/24/2006 0.2     Nitrite Urine (no units)   Date Value   08/31/2023 Negative   06/23/2021 Negative     Leukocyte Esterase Urine (no units)   Date Value   08/31/2023 Negative   06/23/2021 Negative       CT abdomen pelvis  FINDINGS:   LOWER CHEST: Prominent aneurysmal dilatation of the descending thoracic aorta measuring approximately 6.0 cm in greatest radial dimension. Minute bilateral pleural effusions. Small pericardial effusion. Mild to moderate atelectasis most marked in the   right lower lobe.     HEPATOBILIARY: Normal.     PANCREAS: Slight calcification which may represent sequelae of chronic pancreatitis. Pancreas is otherwise normal with no changes of acute pancreatitis.     SPLEEN: Single tiny calcification which may represent a calcified granuloma.     ADRENAL GLANDS: Normal.     KIDNEYS/BLADDER: Marked atrophy of both kidneys with no hydronephrosis. Transplant kidney in the left  hemipelvis.     BOWEL: Moderate findings of diverticulosis with no evidence for diverticulitis. The appendix is normal.     LYMPH NODES: Normal.     VASCULATURE: There is an endograft seen involving the infrarenal abdominal aorta and extending into the left common iliac artery. There is aneurysmal dilatation seen of the abdominal aorta most marked in the upper abdominal aorta and this measures   approximately 5 cm in greatest AP dimension. No evidence for rupture. Prominence of the right groin soft tissues most typical for a postoperative seroma/pseudoaneurysm and this has decreased in size since 06/06/2023 which previously measured   approximately 4 cm in greatest radial dimension.     PELVIC ORGANS: Normal.     MUSCULOSKELETAL: Moderate degenerative changes most marked at the L5-S1 interspace.                                                                      IMPRESSION:   1.  No significant changes since 06/06/2023 with again seen significant areas of aneurysmal dilatation most pronounced in the descending thoracic aorta. Postoperative changes to include an endograft.     2.  Findings most typical for a partially resolved seroma/pseudoaneurysm in the right groin.     3.  Advanced atrophy of the native kidneys with transplant kidney seen in the left hemipelvis.     4.  Tiny pericardial effusion.     5.  Tiny bilateral pleural effusions with slight atelectasis.       Chiki Peterson MD   [facility] - Neph[facility]  [phone]

## 2023-09-25 NOTE — PROGRESS NOTES
Observation goals  PRIOR TO DISCHARGE        Comments: -diagnostic tests and consults completed and resulted: NOT MET    -vital signs normal or at patient baseline: NOT MET    -tolerating oral intake to maintain hydration: PARTIALLY MET    -adequate pain control on oral analgesics: MET

## 2023-09-25 NOTE — PROGRESS NOTES
"[facility] Progress Note     IMPRESSION/RECS:  Abd pain, transient, none presently.    DDx favors constipation (for which treatment should be continued), but mesenteric ischemia could be implicated, though notoriously difficult to diagnose.  For acute, recurrent pain, would be appropriate to consider CT with IV contrast to aortic graft more thoroughly (though risks/benefit of contrast needs to be considered in this renal transplant pt).     Please call with questions, will sign off.     Total time spent in chart review, direct medical discussion, examination, and documentation was 30 minutes    Fabian Lyon DO   [facility]  Cell [phone]    ________________________________________________________________________      SUBJECTIVE:  No BM today after laxatives.  Reviewed with her the above recommendations.      OBJECTIVE:  BP (!) 145/95 (BP Location: Right arm)   Pulse 83   Temp 98.3  F (36.8  C) (Oral)   Resp 16   Ht 1.549 m (5' 1\")   Wt 39.5 kg (87 lb)   SpO2 92%   BMI 16.44 kg/m    Temp (24hrs), Av  F (36.7  C), Min:97.7  F (36.5  C), Max:98.3  F (36.8  C)    Patient Vitals for the past 72 hrs:   Weight   23 0711 39.5 kg (87 lb)   23 0331 37.3 kg (82 lb 4.8 oz)   23 1545 34 kg (75 lb)       Intake/Output Summary (Last 24 hours) at 2023 1541  Last data filed at 2023 0812  Gross per 24 hour   Intake 1510 ml   Output --   Net 1510 ml        PHYSICAL EXAM  GEN: Alert, oriented x3, communicative and in NAD.    ABD: ND, +BS, no guarding or pain to palpation, no rebound, no HSM.  SKIN: No rash, jaundice or spider angiomata      Additional Data:  I have reviewed the patient's new clinical lab results:     Recent Labs   Lab Test 23  0651 23  1143 09/10/23  0650 23  0618 23  1149 23  1323 23  1158 21  0157 21  2200   WBC 11.5* 12.9* 9.5   < > 4.8   < > 14.7*   < >  --    HGB 9.1* 10.0* 10.8*   < > 10.8*   < > " 12.0   < >  --    MCV 83 80 83   < > 87   < > 92   < >  --     282 180   < > 252   < > 295   < >  --    INR  --   --   --   --  1.05  --  1.03  --  1.18*    < > = values in this interval not displayed.     Recent Labs   Lab Test 09/24/23  0651 09/23/23  1143 09/10/23  0650   POTASSIUM 4.9 4.3 4.9   CHLORIDE 99 97* 102   CO2 22 23 21*   BUN 56.9* 60.1* 46.5*   ANIONGAP 12 11 13     Recent Labs   Lab Test 09/23/23  1651 09/23/23  1143 09/05/23  1017 09/01/23  0544 08/31/23  0447 08/30/23  1149 03/24/23  1000 03/02/23  1400 06/23/21  0555 06/23/21  0505   ALBUMIN  --  3.2* 3.3* 3.1*  --  3.8   < >  --    < >  --    BILITOTAL  --  0.4 0.3 0.2  --  0.2   < >  --    < >  --    ALT  --  10 <5 14  --  15   < >  --    < >  --    AST  --  14 18 30  --  22   < >  --    < >  --    PROTEIN  --   --   --   --  100*  --   --  20*  --  Negative   LIPASE 19 19  --   --   --  35  --   --    < >  --     < > = values in this interval not displayed.

## 2023-09-25 NOTE — PLAN OF CARE
Goal Outcome Evaluation:           Observation goals  PRIOR TO DISCHARGE        Comments: -diagnostic tests and consults completed and resultednot met  -vital signs normal or at patient baseline met  -tolerating oral intake to maintain hydration met  -adequate pain control on oral analgesics met  Nurse to notify provider when observation goals have been met and patient is ready for discharge.

## 2023-09-25 NOTE — UTILIZATION REVIEW
Admission Status; Secondary Review Determination       Under the authority of the Utilization Management Committee, the utilization review process indicated a secondary review on the above patient. The review outcome is based on review of the medical records, discussions with staff, and applying clinical experience noted on the date of the review.     (x) Inpatient Status Appropriate - This patient's medical care is consistent with medical management for inpatient care and reasonable inpatient medical practice.     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 CT abdomen with no acute issues, but was found to have an acute kidney injury with creatinine increased to 2.7 from baseline of 2. With IVF, only increased to 2.64 today. As such she remains on IVF. She remains with abdominal pain. Nephrology consulted for elevated tacrolimus levels. Given continue symptoms, renal injury, and increased risk given immunosuppressants, would advance to inpatient.     The expected length of stay at the time of admission was more than 2 nights because of the severity of illness, intensity of service provided, and risk for adverse outcome. Inpatient admission is appropriate.         This document was produced using voice recognition software       The information on this document is developed by the utilization review team in order for the business office to ensure compliance. This only denotes the appropriateness of proper admission status and does not reflect the quality of care rendered.   The  definitions of Inpatient Status and Observation Status used in making the determination above are those provided in the CMS Coverage Manual, Chapter 1 and Chapter 6, section 70.4.   Sincerely,   Luis Alfredo Huston DO  Physician Advisor  [facility].

## 2023-09-26 NOTE — PLAN OF CARE
Goal Outcome Evaluation:        PRIMARY Concern: abdominal pain  SAFETY RISK Concerns (fall risk, behaviors, etc.): fall      Tests/Procedures for NEXT shift:  None  Consults? (Pending/following, signed-off?) none  Where is patient from? (Home, TCU, etc.): home  Other Important info for NEXT shift: Pt diastolic has been constantly elevated, MD notified.   Anticipated DC date & active delays: TBD  SUMMARY NOTE:            Orientation/Cognitive: alert and oriented  Observation Goals (Met/ Not Met): Not met  Mobility Level/Assist Equipment: A1 gb/w  Antibiotics & Plan (IV/po, length of tx left):  IV infusing @ 75ml/hr  Pain Management: Oxycodone, Tylenol given this shift   Tele/VS/O2:  BP elevated, MD notified.  oxygen 3liters NC, other VSS   ABNL Lab/BG: labs none  Diet: low fiber diet   Bowel/Bladder: continent  Skin Concerns: Bleachable redness to sacrum/ coccyx area. Mepilex in place    Drains/Devices: IV infusing   Patient Stated Goal for Today: Pain management

## 2023-09-26 NOTE — CONSULTS
"CLINICAL NUTRITION SERVICES  -  ASSESSMENT NOTE      Malnutrition: % Weight Loss:  None noted  % Intake:  <75% for > 7 days (moderate malnutrition)  Subcutaneous Fat Loss:  Global/full body = severe (9/6 eval)  Muscle Loss:  Global/full body = severe (9/6 eval)  Fluid Retention:  None noted    Malnutrition Diagnosis: Severe malnutrition  In Context of:  Acute illness or injury  Chronic illness or disease        REASON FOR ASSESSMENT  Maribel Yang is a 72 year old female seen by Registered Dietitian for Admission Nutrition Risk Screen for Have you recently lost weight without trying? - Yes, unsure of amount   Have you eaten poorly because of a decreased appetite? - Yes         NUTRITION HISTORY  - Unable to obtain nutrition history as patient not available but she was recently at the  (earlier this month) and the following history was obtained at that time:  - Pt last seen by inpatient dietitian 8/31/23. Pt was receiving Ensure Plus TID.  Pt reports using Ensure at home and was told goal of BID but isn't able to afford more than once daily. She reports not being a big eater in general. She typically does a late breakfast, early dinner, and HS snack that includes protein (such as toast and eggs). She reports PO hasn't changed much recently even w/ feeling ill.     - Did note in H&P that she was unable to eat for several days PTA d/t abdominal pain.       CURRENT NUTRITION ORDERS  Diet Order:     Low Fiber + Ensure TID with meals     Current Intake/Tolerance:  Per flowsheets, consuming %         NUTRITION FOCUSED PHYSICAL ASSESSMENT FOR DIAGNOSING MALNUTRITION)  Yes (Per RD eval 9/6)                Observed:    Muscle wasting (refer to documentation in Malnutrition section) and Subcutaneous fat loss (refer to documentation in Malnutrition section)    Obtained from Chart/Interdisciplinary Team:  None     ANTHROPOMETRICS  Height: 5' 1\"  Weight: 39.5 kg (87#)(9/25)  Body mass index is 16.44 " kg/m   Weight Status:  Underweight BMI <18.5  IBW: 47.7 kg   % IBW: 83%  Weight History:   Wt Readings from Last 10 Encounters:   09/25/23 39.5 kg (87 lb)   09/10/23 34.5 kg (76 lb)   08/30/23 37.6 kg (83 lb)   07/19/23 37.2 kg (82 lb)   06/28/23 38.6 kg (85 lb 1.6 oz)   06/21/23 39.9 kg (88 lb)   06/14/23 39.1 kg (86 lb 3.2 oz)   04/06/23 40.4 kg (89 lb)   03/15/23 40.3 kg (88 lb 14.4 oz)   02/02/23 39.9 kg (88 lb)     No recent weight loss noted     LABS  Miralax + Senokot     MEDICATIONS  IVF at 75 mL/hr      ASSESSED NUTRITION NEEDS PER APPROVED PRACTICE GUIDELINES:    Dosing Weight 39.5 kg   Estimated Energy Needs: 4321-1671 kcals (35-40 Kcal/Kg)  Justification: repletion and underweight  Estimated Protein Needs: 60-80 grams protein (1.5-2 g pro/Kg)  Justification: repletion and hypercatabolism with acute illness  Estimated Fluid Needs: 7158-5781 mL (1 mL/Kcal)  Justification: maintenance    MALNUTRITION:  % Weight Loss:  None noted  % Intake:  <75% for > 7 days (moderate malnutrition)  Subcutaneous Fat Loss:  Global/full body = severe (9/6 eval)  Muscle Loss:  Global/full body = severe (9/6 eval)  Fluid Retention:  None noted    Malnutrition Diagnosis: Severe malnutrition  In Context of:  Acute illness or injury  Chronic illness or disease    NUTRITION DIAGNOSIS:  Malnutrition related to abdominal pain, reduced intake, and fat and muscle loss as evidenced by coding for severe malnutrition       NUTRITION INTERVENTIONS  Recommendations / Nutrition Prescription  Regular diet as tolerated  Ensure as currently ordered     Implementation  Nutrition education: Not appropriate at this time due to patient condition    Nutrition Goals  Patient will consistently consume at least 75% meals and supplements     MONITORING AND EVALUATION:  Progress towards goals will be monitored and evaluated per protocol and Practice Guidelines    Heather Silva RD, LD, CNSC   Clinical Dietitian - [facility]

## 2023-09-26 NOTE — PROGRESS NOTES
MD Notification    Notified Person: MD    Notified Person Name: Tyrese Valente    Notification Date/Time: 0149    Notification Interaction: Amcom    Purpose of Notification: Pt diastolic is constantly high. Pt is asymptomatic.     Orders Received:    Comments:

## 2023-09-26 NOTE — SIGNIFICANT EVENT
Significant Event Note    Time of event: 1:50 AM September 26, 2023    Description of event:  Elevated diastolic BP, chronic in nature    Plan:  Observe for now  Can consider uptitrating BP meds in AM    Ambrosio Xiong MD

## 2023-09-26 NOTE — PLAN OF CARE
PRIMARY Concern: abdominal pain  SAFETY RISK Concerns (fall risk, behaviors, etc.): fall      Tests/Procedures for NEXT shift:  None  Consults? GI signed off  Where is patient from? home  Other Important info for NEXT shift: Pt diastolic has been constantly elevated, MD aware, diastolic hypertension  Anticipated DC date & active delays: TBD  SUMMARY NOTE:            Orientation/Cognitive: A&O x 4  Observation Goals (Met/ Not Met): Not met  Mobility Level/Assist Equipment: A1 GBW  Antibiotics & Plan:  IV infusing @ 75ml/hr  Pain Management: Tylenol given this shift   Tele/VS/O2: Humidified O2 3liters NC, other VSS   ABNL Lab/BG: Hbg 8.5, BUN & creat elevated, GFR down.   Diet: low fiber diet   Bowel/Bladder: Continent  Skin Concerns: Blanchable redness to sacrum/ coccyx area. Mepilex in place    Drains/Devices: IV infusing   Patient Stated Goal for Today: Pain management

## 2023-09-26 NOTE — PROGRESS NOTES
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     She was recently hospitalized at [facility] from 9/5-9/10 due to COVID-19 pneumonitis with respiratory failure.  Reports she started experiencing mild abdominal discomfort while she was hospitalized.  Abdominal pain worsened on day of admission.  No nausea or vomiting, no fever or chills, no diarrhea or hematochezia.  Reports constipation, anorexia.       CT A/P showed prominent aneurysmal dilatation of descending thoracic aorta measuring 6 cm, small pericardial effusion, mild to moderate atelectasis, slight calcification in pancreas which could represent chronic pancreatitis.  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  Moderate diverticulosis without diverticulitis.  No cause to explain abdominal pain was seen on CT scan.        Mid abdominal pain-unclear etiology  - Has mild leukocytosis of 12.9>11.5, elevated CRP of 62 (up from 22 two weeks ago), Lactate normal at 1.  Epigastric tenderness on exam.  LFTs at baseline.  Lipase normal  Pain is worse at night.   - CT scan did not show any cause to explain the pain  - Last EGD was in February 2023. Largely unremarkable   - Last colonoscopy was in July 2022 that showed moderately congested mucosa in transverse colon, ascending colon and cecum, diffuse mild inflammation in ascending colon and cecum, diverticulosis of sigmoid colon.  Biopsies showed chronic inflammation and patchy fibrosis.  *during admission [facility] 8/30 had neg enteric panel, positive C.diff but antigen test negative suggestive of  colonization. PCP had ordered repeat Cdiff at outpatient visit 9/23, stool not collected  *Cause of abdominal pain is unclear.  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  Recent tacrolimus level was elevated at 8.3 on 9/7.   *Follows up with transplant nephrology  *Baseline creatinine up to 1.9.  Creatinine on admission elevated at 2.7  Cr remains 2.64  -Continue holding torsemide  -she has been on NS @75/hr since 9/24  - Continue prednisone 5 mg daily   --tacrolimus level 9/24 elevated at 13 though not clear trough, will hold until Nephrology weighs in per pharmacy rec   --repeat tacrolimus level 6.3 with nephrology following     Hypomagnesemia, magnesium 1.4  - Administer 2 g IV magnesium sulfate  -Serum magnesium level improved to 2.5     Coronary artery disease, s/p inferior STEMI, s/p RCA stenting, 4/2021  Chronic elevation of troponin  Chronic diastolic CHF with recovered EF  Essential hypertension  - On DAPT, Imdur, hydralazine, metoprolol, statin  - Troponin elevated at 40, 39.  This is her baseline.  Trend is flat  -Clinically euvolemic.  -Continue aspirin, Plavix, atorvastatin  -increase  hydralazine 10 mg 3 times daily to 25 mg BID, Imdur, metoprolol 100 mg daily and 50 mg q PM     Recent COVID-19 infection with bilateral pneumonitis with acute hypoxic respiratory failure  -Tested positive on 8/30.  Hospitalized at [facility] from 9/5-9/10.  Treated with IV remdesivir and dexamethasone.  Was discharged on room air.  -currently continues with oxygen use for comfort, sats 92% on room air   -Isolation precautions are not needed     Generalized weakness  -During recent stay at [facility], PT OT expressed concerns about patient returning home and her ability to complete ADLs.  Patient  declined home PT. reports she was able to do ADLs until day prior to admission   -Consult PT     Small pericardial effusion without tamponade  -Limited echo 9/5 showed EF 50-55% normal RV function, mild-moderate pericardial effusion of 1.6 cm without evidence of tamponade.  -CT scan on admission mentioned tiny pericardial effusion  -outpatient follow up     Peripheral vascular disease  Aneurysmal dilation of the ascending thoracic aorta measuring 6 cm  AAA, S/p endovascular aneurysm repair and femorofemoral bypass, 4/2021  S/p right femoral to profunda femoris artery bypass and repair of right femoral pseudoaneurysm, 6/2023  -Followed by vascular surgery  - continue asa, statin      Small cell carcinoma of right lung, s/p SBRT, 2014  Squamous cell carcinoma of anus, s/p full-thickness excision and chemoradiation, 2018  -Stable     Anemia of chronic renal disease  -Hemoglobin stable ~10.     COPD  -No acute exacerbation  -Continue Combivent inhaler 4 times daily     Diet: Snacks/Supplements Adult: Ensure Enlive; With Meals  Advance Diet as Tolerated: Full Liquid Diet    DVT Prophylaxis: Pneumatic Compression Devices, already on DAPT   Villavicencio Catheter: Not present  Lines: None     Cardiac Monitoring: None  Code Status: Full Code          Clinically Significant Risk Factors Present on Admission             # Hypomagnesemia: Lowest Mg = 1.4 mg/dL in last 2 days, will replace as needed   # Hypoalbuminemia: Lowest albumin = 3.2 g/dL at 9/23/2023 11:43 AM, will monitor as appropriate   # Drug Induced Platelet Defect: home medication list includes an antiplatelet medication  # Acute Kidney Injury, unspecified: based on a >150% or 0.3 mg/dL increase in last creatinine compared to past 90 day average, will monitor renal function  # Hypertension: Noted on problem list  # Chronic heart failure with preserved ejection fraction: heart failure noted on problem list and last echo with EF >50%     # Cachexia: Estimated body mass  "index is 16.44 kg/m  as calculated from the following:    Height as of this encounter: 1.549 m (5' 1\").    Weight as of this encounter: 39.5 kg (87 lb).       # COPD: noted on problem list            Disposition Plan     Expected Discharge Date: 09/27/2023        Discharge Comments: PT recommends TCU vs home wit assist and home PT  pt denies TCU would like to go home. Per PT pt should have 24/7 home assist and PT to improve mobility/strengthening  [facility] accepted referal       Disposition:     Interval History   -- chart reviewed  -- discussed with RN  -- noted ongoing elevated in BP which she states is from being in the hosptial  -- abdominal pain usually after eating    -Data reviewed today: I reviewed all new labs and imaging over the last 24 hours. I personally reviewed.    Physical Exam    , Blood pressure (!) (P) 156/112, pulse (P) 72, temperature 97.6  F (36.4  C), temperature source Oral, resp. rate (P) 16, height 1.549 m (5' 1\"), weight 39.5 kg (87 lb), SpO2 (P) 94 %, not currently breastfeeding.  Vitals:    09/23/23 1545 09/24/23 0331 09/25/23 0711   Weight: 34 kg (75 lb) 37.3 kg (82 lb 4.8 oz) 39.5 kg (87 lb)     Vital Signs with Ranges  Temp:  [97.6  F (36.4  C)-98.9  F (37.2  C)] 97.6  F (36.4  C)  Pulse:  [72-93] (P) 72  Resp:  [16-18] (P) 16  BP: (141-170)/() (P) 156/112  SpO2:  [91 %-100 %] (P) 94 %  I/O's Last 24 hours  I/O last 3 completed shifts:  In: 1545 [P.O.:120; I.V.:1425]  Out: 200 [Urine:200]    Constitutional: Awake, alert, cooperative, no apparent distress  Respiratory: Clear to auscultation bilaterally, no crackles or wheezing  Cardiovascular: Regular rate and rhythm, normal S1 and S2, and no murmur noted  GI: Normal bowel sounds, soft, non-distended, some mild tender epigastrum  Skin/Integumen: No rashes, no cyanosis, no edema  Other:      Medications   All medications were reviewed.   sodium chloride 75 mL/hr at 09/25/23 2106      aspirin  81 mg Oral Daily    " atorvastatin  80 mg Oral Daily    clopidogrel  75 mg Oral Daily    hydrALAZINE  25 mg Oral TID    ipratropium-albuterol  1 puff Inhalation 4x Daily    isosorbide mononitrate  60 mg Oral Daily    metoprolol tartrate  100 mg Oral Daily    metoprolol tartrate  50 mg Oral QPM    pantoprazole  40 mg Oral QAM AC    polyethylene glycol  17 g Oral Daily    predniSONE  5 mg Oral Daily    senna-docusate  1 tablet Oral BID    Or    senna-docusate  2 tablet Oral BID    tacrolimus  1.5 mg Oral BID        Data   Recent Labs   Lab 09/26/23  0619 09/24/23  0651 09/23/23  1651 09/23/23  1143   WBC 8.8 11.5*  --  12.9*   HGB 8.5* 9.1*  --  10.0*   MCV 83 83  --  80    235  --  282    133*  --  131*   POTASSIUM 4.7 4.9  --  4.3   CHLORIDE 107 99  --  97*   CO2 19* 22  --  23   BUN 45.5* 56.9*  --  60.1*   CR 2.18* 2.64*  --  2.71*   ANIONGAP 11 12  --  11   KAYKAY 8.5* 8.7*  --  9.3   GLC 91 88  --  126*   ALBUMIN  --   --   --  3.2*   PROTTOTAL  --   --   --  6.3*   BILITOTAL  --   --   --  0.4   ALKPHOS  --   --   --  128*   ALT  --   --   --  10   AST  --   --   --  14   LIPASE  --   --  19 19       Recent Results (from the past 24 hour(s))   XR Colon Water Soluble    Narrative    COLON WATER SOLUBLE  9/26/2023 3:11 PM     HISTORY: Constipation; abdominal pain.    COMPARISON: None.    TECHNIQUE: Water soluble contrast was introduced into the colon  through a rectal tube under gravity.      FLUOROSCOPY TIME: 3.1 minutes.    SPOT FILMS: 8    FINDINGS: Contrast flowed easily into the colon.  The colon is widely  patent from the rectum to the cecum. Small to moderate amount of  stool. There was no reflux of contrast into the terminal ileum. There  were no filling defects.      Impression    IMPRESSION: No obstruction demonstrated.       Fabiano Larkin MD  Text Page  (7am to 6pm)

## 2023-09-26 NOTE — PLAN OF CARE
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 Humidified O2 2liters NC at NOC.   ABNL Lab/BG: Hbg 8.5, BUN & creat elevated, GFR down.   Diet: low fiber diet   Bowel/Bladder: Continent of B&B. BM x1  Skin Concerns: Blanchable redness to sacrum/ coccyx area. Mepilex in place    Drains/Devices:  PIV infusing NS @ 75ml/hr

## 2023-09-27 NOTE — PLAN OF CARE
PRIMARY Concern: abdominal pain  SAFETY RISK Concerns (fall risk, behaviors, etc.): fall      Tests/Procedures for NEXT shift:  None  Consults? (Pending/following, signed-off?) GI  Where is patient from? (Home, TCU, etc.): home  Other Important info for NEXT shift: Pt diastolic has been constantly elevated, MD aware.   Anticipated DC date & active delays: TBD  SUMMARY NOTE:            Orientation/Cognitive: alert and oriented  Observation Goals (Met/ Not Met): Not met  Mobility Level/Assist Equipment: A1 gb/w  Antibiotics & Plan (IV/po, length of tx left):  IV infusing   Pain Management: PRN oxy and tylenol given x1  Tele/VS/O2: VSS on 3L NC BP elevated- MD aware  ABNL Lab/BG: labs none  Diet: low fiber diet   Bowel/Bladder: continent  Skin Concerns: Blanchable redness to sacrum/ coccyx area. Mepilex in place    Drains/Devices: PIV infusing NS 75ml/hr

## 2023-09-27 NOTE — PROGRESS NOTES
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     AMY, nonoliguric, improving   Recent COVID infection with poor PO intake. Prerenal AMY. Continues to have poor PO intake and appearing hypovolemic, would continue NS 75 ml/h. CT abdomen with normal-appearing transplanted kidney.   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  Baseline creatinine 1.6-1.9, however after recent admission discharged with a creatinine of 2.1.  During recent admission notably had elevated Tac level, dose was decreased to 1.5 mg twice daily.  Patient reports she was taking 2 mg twice daily at home, unaware of this change. Tac level on 9/24 elevated at 13, however was not a true trough. Two doses held before resuming 1.5mg BID.   - tac trough tomorrow (goal 4-6)   - continue PTA prednisone 5mg daily   - continue tacrolimus 1.5mg BID     Abdominal pain   CT without acute pathology. She is constipated. Workup per primary team and GI. If IV contrast study would be helpful to evaluate for certain etiologies including mesenteric ischemia then now would be ideal time to get study done with improved AMY and euvolemic state.     CAD  Chronic diastolic CHF  HTN  Continue PTA Imdur hydralazine metoprolol.     Small pericardial effusion without tamponade  Noted on TTE from previous admission.  CT this admission with tiny pericardial effusion.  No evidence of tamponade at this time.     PVD  AAA s/p endovascular aneurysm repair and femorofemoral bypass  S/p right femoral to  "profunda femoris artery bypass and repair of right femoral pseudoaneurysm     Small cell carcinoma right lung  Squamous cell carcinoma of anus s/p full-thickness excision and chemoradiation     Anemia of renal disease   Hemoglobin stable at 10.      Chiki Peterson MD   [facility]  Office: [phone]          Interval History:     No acute events overnight   Gastrogaffin study yesterday negative for obstruction   She notes some diarrhea after study   Continues to have abd pain   Denies SOB, using O2 prn   Eating some clears, but not without pain   Feels hungry   Denies fevers or chills   Good UOP    updated at bedside           Medications and Allergies:      aspirin  81 mg Oral Daily    atorvastatin  80 mg Oral Daily    clopidogrel  75 mg Oral Daily    hydrALAZINE  25 mg Oral TID    ipratropium-albuterol  1 puff Inhalation 4x Daily    isosorbide mononitrate  60 mg Oral Daily    metoprolol tartrate  100 mg Oral Daily    metoprolol tartrate  50 mg Oral QPM    pantoprazole  40 mg Oral QAM AC    polyethylene glycol  17 g Oral Daily    predniSONE  5 mg Oral Daily    senna-docusate  1 tablet Oral BID    Or    senna-docusate  2 tablet Oral BID    tacrolimus  1.5 mg Oral BID        Allergies   Allergen Reactions    Blood Transfusion Related (Informational Only) Other (See Comments)     Patient has a history of a clinically significant antibody against RBC antigens.  A delay in compatible RBCs may occur.    Tramadol-Acetaminophen Nausea and Vomiting and Hives    Hydrocodone Nausea and Vomiting and Hives            Physical Exam:   Vitals were reviewed  BP (!) 173/106   Pulse 84   Temp 97.4  F (36.3  C) (Oral)   Resp 18   Ht 1.549 m (5' 1\")   Wt 39.5 kg (87 lb)   SpO2 97%   BMI 16.44 kg/m      Wt Readings from Last 3 Encounters:   09/25/23 39.5 kg (87 lb)   09/10/23 34.5 kg (76 lb)   08/30/23 37.6 kg (83 lb)       Intake/Output Summary (Last 24 hours) at 9/27/2023 1344  Last data filed at " 9/26/2023 1700  Gross per 24 hour   Intake 240 ml   Output --   Net 240 ml       GENERAL APPEARANCE: NAD, chronically ill and thin appearing   HEENT: normocephalic, slightly dry MM   RESP: clear  CV: RRR  EXTREMITIES/SKIN: no c/c/rashes/lesions; no edema  NEURO:  alert, oriente,d normal speech           Data:     BMP  Recent Labs   Lab 09/27/23  0702 09/26/23  0619 09/24/23  0651 09/23/23  1143    137 133* 131*   POTASSIUM 4.4 4.7 4.9 4.3   CHLORIDE 107 107 99 97*   KAYKAY 8.3* 8.5* 8.7* 9.3   CO2 19* 19* 22 23   BUN 33.3* 45.5* 56.9* 60.1*   CR 1.81* 2.18* 2.64* 2.71*   GLC 93 91 88 126*     CBC  Recent Labs   Lab 09/27/23  0702 09/26/23  0619 09/24/23  0651 09/23/23  1143   WBC 7.5 8.8 11.5* 12.9*   HGB 8.3* 8.5* 9.1* 10.0*   HCT 26.4* 27.6* 28.7* 30.4*   MCV 83 83 83 80    246 235 282     Lab Results   Component Value Date    AST 14 09/27/2023    ALT 8 09/27/2023    GGT 76 (H) 06/23/2021    ALKPHOS 106 (H) 09/27/2023    BILITOTAL 0.3 09/27/2023    BILICONJ 0.0 12/15/2009    BILIDIRECT .0@ 03/10/2005     Lab Results   Component Value Date    INR 1.05 08/30/2023     Color Urine (no units)   Date Value   09/25/2023 Light Yellow   06/23/2021 Straw     Appearance Urine (no units)   Date Value   09/25/2023 Clear   06/23/2021 Clear     Glucose Urine (mg/dL)   Date Value   09/25/2023 Negative   06/23/2021 Negative     Bilirubin Urine (no units)   Date Value   09/25/2023 Negative   06/23/2021 Negative     Ketones Urine (mg/dL)   Date Value   09/25/2023 Negative   06/23/2021 Negative     Specific Gravity Urine (no units)   Date Value   09/25/2023 1.012   06/23/2021 1.010     pH Urine   Date Value   09/25/2023 5.5   06/23/2021 6.5 pH     Protein Albumin Urine (mg/dL)   Date Value   09/25/2023 50 (A)   06/23/2021 Negative     Urobilinogen Urine (EU/dL)   Date Value   02/24/2006 0.2     Nitrite Urine (no units)   Date Value   09/25/2023 Negative   06/23/2021 Negative     Leukocyte Esterase Urine (no units)   Date  Value   09/25/2023 Trace (A)   06/23/2021 Negative         Attestation:  I have reviewed today's vital signs, notes, medications, labs and imaging.    Chiki Peterson MD  [facility] - Nephrology  Office: [phone]

## 2023-09-27 NOTE — PLAN OF CARE
SUMMARY NOTE:            Orientation/Cognitive: A&O x 4  Observation Goals (Met/ Not Met): Not met  Mobility Level/Assist Equipment: A1 gb/w  Antibiotics & Plan (IV/po, length of tx left):  IV infusing   Pain Management: PRN oxy and tylenol given x1  Tele/VS/O2: VSS on 3L NC BP elevated- MD aware  ABNL Lab/BG: Labs none  Diet: Low Fiber Diet/ NPO till after US  Bowel/Bladder: Continent  Skin Concerns: Blanchable redness to Sacrum/ Coccyx area. Mepilex in place    Drains/Devices: PIV SL    US this evening of mesenteric Arteries, continue plan of care. Discharge pending medical improvement and pain control.

## 2023-09-27 NOTE — PROGRESS NOTES
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     She was recently hospitalized at [facility] from 9/5-9/10 due to COVID-19 pneumonitis with respiratory failure.  Reports she started experiencing mild abdominal discomfort while she was hospitalized.  Abdominal pain worsened on day of admission.  No nausea or vomiting, no fever or chills, no diarrhea or hematochezia.  Reports constipation, anorexia.       CT A/P showed prominent aneurysmal dilatation of descending thoracic aorta measuring 6 cm, small pericardial effusion, mild to moderate atelectasis, slight calcification in pancreas which could represent chronic pancreatitis.  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  Moderate diverticulosis without diverticulitis.  No cause to explain abdominal pain was seen on CT scan.        Mid abdominal pain-nocturnal  Peripheral vascular disease  Aneurysmal dilation of the ascending thoracic aorta measuring 6 cm  AAA, S/p endovascular aneurysm repair and femorofemoral bypass, 4/2021  S/p right femoral to profunda femoris artery bypass and repair of right femoral pseudoaneurysm, 6/2023  - Has mild leukocytosis of 12.9>11.5, elevated CRP of 62 (up from 22 two weeks ago), Lactate normal at 1.  Epigastric tenderness on exam.  LFTs at baseline.  Lipase normal  Pain is worse at night.   - CT scan did not show any cause to explain the pain  - Last EGD was in February 2023. Largely unremarkable   - Last colonoscopy was in July 2022 that showed moderately congested mucosa in transverse colon, ascending colon and cecum,  diffuse mild inflammation in ascending colon and cecum, diverticulosis of sigmoid colon.  Biopsies showed chronic inflammation and patchy fibrosis.  *during admission [facility] 8/30 had neg enteric panel, positive C.diff but antigen test negative suggestive of colonization. PCP had ordered repeat Cdiff at outpatient visit 9/23, stool not collected  *Cause of abdominal pain is unclear.  - GI consulted, recommend trial of daily laxative  -ADAT, currently on full liquids   --limit narcotics   --colon gastrograffin showed no obstruction  --will check cortisol levels, random and cosyntropin study  --discussed with radiology (Bakari), has known chronic celiac occlusion and YURI occlusion with MRA of abdomen looking for SMA stenosis, will try to hold off of MRA and attempt abdominal US.    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  Recent tacrolimus level was elevated at 8.3 on 9/7.   *Follows up with transplant nephrology  *Baseline creatinine up to 1.9.  Creatinine on admission elevated at 2.7  Cr remains 2.64  -Continue holding torsemide  -she has been on NS @75/hr since 9/24  - Continue prednisone 5 mg daily   --tacrolimus level 9/24 elevated at 13 though not clear trough, will hold until Nephrology weighs in per pharmacy rec   --repeat tacrolimus level 6.3 with nephrology following     Hypomagnesemia, magnesium 1.4  - Administer 2 g IV magnesium sulfate  -Serum magnesium level improved to 2.5     Coronary artery disease, s/p inferior STEMI, s/p RCA stenting, 4/2021  Chronic elevation of troponin  Chronic diastolic CHF with recovered EF  Essential hypertension  -On DAPT, Imdur, hydralazine, metoprolol, statin  -Troponin elevated at 40, 39.  This is her baseline.  Trend is flat  -Clinically euvolemic.  -Continue aspirin, Plavix, atorvastatin  -increase  hydralazine 10 mg 3 times daily to 25 mg BID, Imdur, metoprolol  100 mg daily and 50 mg q PM     Recent COVID-19 infection with bilateral pneumonitis with acute hypoxic respiratory failure  -Tested positive on 8/30.  Hospitalized at [facility] from 9/5-9/10.  Treated with IV remdesivir and dexamethasone.  Was discharged on room air.  -currently continues with oxygen use for comfort, sats 92% on room air   -Isolation precautions are not needed     Generalized weakness  -During recent stay at [facility], PT OT expressed concerns about patient returning home and her ability to complete ADLs.  Patient declined home PT. reports she was able to do ADLs until day prior to admission   -Consult PT     Small pericardial effusion without tamponade  -Limited echo 9/5 showed EF 50-55% normal RV function, mild-moderate pericardial effusion of 1.6 cm without evidence of tamponade.  -CT scan on admission mentioned tiny pericardial effusion  -outpatient follow up     Small cell carcinoma of right lung, s/p SBRT, 2014  Squamous cell carcinoma of anus, s/p full-thickness excision and chemoradiation, 2018  -Stable     Anemia of chronic renal disease  -Hemoglobin stable ~10.     COPD  -No acute exacerbation  -Continue Combivent inhaler 4 times daily     Diet: Snacks/Supplements Adult: Ensure Enlive; With Meals  Advance Diet as Tolerated: Full Liquid Diet    DVT Prophylaxis: Pneumatic Compression Devices, already on DAPT   Villavicencio Catheter: Not present  Lines: None     Cardiac Monitoring: None  Code Status: Full Code          Clinically Significant Risk Factors Present on Admission             # Hypomagnesemia: Lowest Mg = 1.4 mg/dL in last 2 days, will replace as needed   # Hypoalbuminemia: Lowest albumin = 3.2 g/dL at 9/23/2023 11:43 AM, will monitor as appropriate   # Drug Induced Platelet Defect: home medication list includes an antiplatelet medication  # Acute Kidney Injury, unspecified: based on a >150% or 0.3 mg/dL increase in last creatinine compared to past 90 day average, will monitor renal function  #  "Hypertension: Noted on problem list  # Chronic heart failure with preserved ejection fraction: heart failure noted on problem list and last echo with EF >50%     # Cachexia: Estimated body mass index is 16.44 kg/m  as calculated from the following:    Height as of this encounter: 1.549 m (5' 1\").    Weight as of this encounter: 39.5 kg (87 lb).       # COPD: noted on problem list      Disposition Plan     Expected Discharge Date: 09/27/2023        Discharge Comments: PT recommends TCU vs home wit assist and home PT  pt denies TCU would like to go home. Per PT pt should have 24/7 home assist and PT to improve mobility/strengthening  [facility] accepted referal       Interval History   -- continues to have  nocturnal pain and on occasion with eating  -- discussed with RN  -- noted ongoing elevated in BP which she states is from being in the hospital    -Data reviewed today: I reviewed all new labs and imaging over the last 24 hours. I personally reviewed.    Physical Exam    , Blood pressure (!) 173/106, pulse 84, temperature 97.4  F (36.3  C), temperature source Oral, resp. rate 18, height 1.549 m (5' 1\"), weight 39.5 kg (87 lb), SpO2 97 %, not currently breastfeeding.  Vitals:    09/23/23 1545 09/24/23 0331 09/25/23 0711   Weight: 34 kg (75 lb) 37.3 kg (82 lb 4.8 oz) 39.5 kg (87 lb)     Vital Signs with Ranges  Temp:  [97.4  F (36.3  C)-98.2  F (36.8  C)] 97.4  F (36.3  C)  Pulse:  [57-84] 84  Resp:  [16-18] 18  BP: (167-181)/(106-113) 173/106  SpO2:  [94 %-97 %] 97 %  I/O's Last 24 hours  I/O last 3 completed shifts:  In: 240 [P.O.:240]  Out: -     Constitutional: Awake, alert, cooperative, no apparent distress  Respiratory: Clear to auscultation bilaterally, no crackles or wheezing  Cardiovascular: Regular rate and rhythm, normal S1 and S2, and no murmur noted  GI: Normal bowel sounds, soft, non-distended, some mild tender epigastrum  Skin/Integumen: No rashes, no cyanosis, no edema  Other:  "     Medications   All medications were reviewed.       aspirin  81 mg Oral Daily    atorvastatin  80 mg Oral Daily    clopidogrel  75 mg Oral Daily    hydrALAZINE  25 mg Oral TID    ipratropium-albuterol  1 puff Inhalation 4x Daily    isosorbide mononitrate  60 mg Oral Daily    metoprolol tartrate  100 mg Oral Daily    metoprolol tartrate  50 mg Oral QPM    pantoprazole  40 mg Oral QAM AC    polyethylene glycol  17 g Oral Daily    predniSONE  5 mg Oral Daily    senna-docusate  1 tablet Oral BID    Or    senna-docusate  2 tablet Oral BID    tacrolimus  1.5 mg Oral BID        Data   Recent Labs   Lab 09/27/23  0702 09/26/23  0619 09/24/23  0651 09/23/23  1651 09/23/23  1143   WBC 7.5 8.8 11.5*  --  12.9*   HGB 8.3* 8.5* 9.1*  --  10.0*   MCV 83 83 83  --  80    246 235  --  282    137 133*  --  131*   POTASSIUM 4.4 4.7 4.9  --  4.3   CHLORIDE 107 107 99  --  97*   CO2 19* 19* 22  --  23   BUN 33.3* 45.5* 56.9*  --  60.1*   CR 1.81* 2.18* 2.64*  --  2.71*   ANIONGAP 10 11 12  --  11   KAYKAY 8.3* 8.5* 8.7*  --  9.3   GLC 93 91 88  --  126*   ALBUMIN 2.4*  --   --   --  3.2*   PROTTOTAL 5.2*  --   --   --  6.3*   BILITOTAL 0.3  --   --   --  0.4   ALKPHOS 106*  --   --   --  128*   ALT 8  --   --   --  10   AST 14  --   --   --  14   LIPASE  --   --   --  19 19         Recent Results (from the past 24 hour(s))   XR Colon Water Soluble    Narrative    COLON WATER SOLUBLE  9/26/2023 3:11 PM     HISTORY: Constipation; abdominal pain.    COMPARISON: None.    TECHNIQUE: Water soluble contrast was introduced into the colon  through a rectal tube under gravity.      FLUOROSCOPY TIME: 3.1 minutes.    SPOT FILMS: 8    FINDINGS: Contrast flowed easily into the colon.  The colon is widely  patent from the rectum to the cecum. Small to moderate amount of  stool. There was no reflux of contrast into the terminal ileum. There  were no filling defects.      Impression    IMPRESSION: No obstruction demonstrated.    ALEX  MD TREVON         SYSTEM ID:  [id number]       Fabiano Larkin MD  Text Page  (7am to 6pm)

## 2023-09-27 NOTE — CONSULTS
Vascular Surgery Consultation    Maribel Yang MRN# [id number]   Age: 72 year old YOB: 1950     Date of Admission:  9/23/2023    Date of Consult:   09/27/23    Reason for consult: Abdominal pain       Requesting service: Hospital medicine; requesting provider: Dr. Larkin                    Assessment and Plan:   72 y.o. 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 Seen in consultation today for several weeks of post-prandial and nocturnal epigastric pain that began after her recent COVID diagnosis.    GI consulted and recommended daily laxatives with minimal symptomatic improvement.    Prior CTA from June 2023 shows severe stenosis of celiac artery with post-stenotic dilation with possible extrinsic compression of the proximal celiac trunk as can be seen with median arcuate ligament syndrome (MALS). However this scan was completed months ago, before abdominal pain began. SMA is widely patent and would suggest nonischemic etiology of abdominal pain. Mesenteric duplex is ordered however MALS and chronic mesenteric ischemia are diagnoses of exclusion and cannot be made by imaging finding and clinical history alone. She is frail with poor functional capacity (crawls up stairs at baseline) and clearly high-risk for any surgical procedure especially in the subacute phase of COVID-19 pneumonia with ongoing hypoxia.    We will follow-up on the results of her mesenteric duplex study. I would ask our GI colleagues to evaluate for other causes of abdominal pain to include repeat EGD. If other sources of abdominal pain are ruled out she may be referred for outpatient celiac plexus nerve block, which can  aid in the diagnosis of MALS.    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 She may follow-up in vascular surgery clinic to discuss these chronic conditions.      Discussed with staff, Dr. Caldwell.    Larry Thornton MD         History of Present Illness:   Ms. Katarina Yang is a 72 year old female with complex vascular surgery history admitted with several weeks of post-prandial and nocturnal epigastric pain and bloating.  She is s/p EVAR with aorto-left-uniiliac stent graft and left to right femorofemoral bypass in 2021 for 5.9 cm AAA and chronically occluded right external iliac artery. She suffered a post-op STEMI. There was infection of the bovine carotid artery fem-fem bypass graft and this was explanted replaced with cryo artery later that year.  She was lost to follow-up for several years before presenting with a large right femoral pseudoaneurysm that was repaired in June 2023.   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    More recently she was hospitalized with acute respiratory failure and COVID-19 pneumonia three weeks ago, discharged home and shortly thereafter began experiencing post-prandial and nocturnal epigastric pain. Describes this as sharp gas pain and bloating. Comes on several hours after a meal, now eating small amounts of jello and soft foods. Did have one episode of diarrhea a few days ago before admission. No nausea, vomiting, melena, or blood bowel movements.   She denies any recent weightloss.  Tells me she lost a lot of weight at the time of her annual cancer treatment 5 years ago but weight has since been stable.  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  YURI is chronically occluded as it is covered by her infrarenal aortic stent graft.  She is a recent former smoker and quit several weeks ago at the time of her COVID diagnosis.  She lives independently with her  in [place].  When asked if she was able to climb 1 flight of stairs prior to her COVID diagnosis she tells me that she typically crawls on her hands and knees because she gets dyspneic when trying to walk up 1 flight.             Past Medical History:     Past Medical History:   Diagnosis Date    Abnormal coagulation profile     p [id number] heterozygote     Age-related osteoporosis without current pathological fracture 06/22/2019    Anemia     Antiplatelet or antithrombotic long-term use     ASCUS with positive high risk HPV 2007, 2015    + HPV 56, 54,& 6, colp - TAL III, Leep =TAL II    Basal cell carcinoma     Congestive heart failure, unspecified HF chronicity, unspecified heart failure type (H) 06/22/2021    COPD (chronic obstructive pulmonary disease) (H)     Depressive disorder 07/2015    Heart attack (H)     History of blood transfusion     Hypertension     Immunosuppressed status (H)     due meds    Kidney replaced by transplant 09/2004    Living donor recipient,  Rejection 7/2005    LSIL (low grade squamous intraepithelial lesion) on Pap smear 04/2013    +HPV 33 or 45, 61      PAD (peripheral artery disease) (H)     PONV (postoperative nausea and vomiting)     Squamous cell lung cancer (H)     Thrombosis of leg 1967    Unspecified disorder of kidney and ureter     X-linked dominant Alport's syndrome.             Past Surgical History:     Past Surgical History:   Procedure Laterality Date    BIOPSY      Kidney, Lung, Breast    BIOPSY ANAL N/A 03/14/2018    Procedure: BIOPSY ANAL;;  Surgeon: Shabbir Leo MD;  [facility]: [facility]    BYPASS GRAFT FEMORAL FEMORAL Bilateral 04/25/2021     Procedure: Axplantation infected graft, femoral to femoral bypass with cadaveric artery, bilateral SATORIUS MUSCLE FLAP CREATION, evacuation of absece, vacuum closure;  Surgeon: Raven Lewis MD;  Location[facility]    COLONOSCOPY      COLONOSCOPY N/A 08/09/2017    Procedure: COMBINED COLONOSCOPY, SINGLE OR MULTIPLE BIOPSY/POLYPECTOMY BY BIOPSY;;  Surgeon: Sushil Hyatt MD;  Location: [facility]    COLONOSCOPY N/A 7/28/2022    Procedure: COLONOSCOPY, WITH BIOPSY;  Surgeon: Moise Corcoran MD;  Location:  GI    COLPOSCOPY,LOOP ELECTRD CERVIX EXCIS  03/11/2008    TAL II    CONIZATION LEEP  07/17/2013    Procedure: CONIZATION LEEP;;  Surgeon: Liliana Renteria MD;  [facility]    CONIZATION LEEP N/A 08/17/2016    Procedure: CONIZATION LEEP;  Surgeon: Liliana Renteria MD;  [facility]    CV CORONARY ANGIOGRAM N/A 04/06/2021    Procedure: CV CORONARY ANGIOGRAM;  Surgeon: Sincere Rosas MD;  Location:  [facility]    CV CORONARY ANGIOGRAM N/A 04/25/2021    Procedure: Coronary Angiogram;  Surgeon: Sincere Rosas MD;  Location:  [facility]    CV PCI STENT DRUG ELUTING N/A 04/06/2021    Procedure: Percutaneous Coronary Intervention Stent Drug Eluting;  Surgeon: Sincere Rosas MD;  Location:  [facility]    ENDOVASCULAR REPAIR ANEURYSM AORTOILIAC Bilateral 04/06/2021    Procedure: Endovascular Abdominal Aortic Aneurysm Repair with Aortouniiliac Device and Femoral-Femoral Artery Bypass with [id number] Artegraft;  Surgeon: Abena Ferrara MD;  [facility]    ESOPHAGOSCOPY, GASTROSCOPY, DUODENOSCOPY (EGD), COMBINED N/A 2/2/2023    Procedure: ESOPHAGOGASTRODUODENOSCOPY, WITH BIOPSY;  Surgeon: Yazan Heredia MD;  Location:  GI    EXAM UNDER ANESTHESIA ANUS  07/15/2014    Procedure: EXAM UNDER ANESTHESIA ANUS;  Surgeon: Radha Musa MD;  [facility]    EXAM UNDER ANESTHESIA ANUS N/A  03/14/2018    Procedure: EXAM UNDER ANESTHESIA ANUS;  Anal Exam Under Anesthesia With Excision of anal lesion, proctoscopy;  Surgeon: Shabbir Leo MD;  [facility]    EYE SURGERY      GENITOURINARY SURGERY      IR OR ANGIOGRAM  04/06/2021    IRRIGATION AND DEBRIDEMENT GROIN Right 04/24/2021    Procedure: IRRIGATION AND DEBRIDEMENT, INGUINAL REGION, I & D PF RIGHT GROIN;  Surgeon: Raven Lewis MD;  [facility]    IRRIGATION AND DEBRIDEMENT GROIN N/A 04/26/2021    Procedure: IRRIGATION AND DEBRIDEMENT, INGUINAL REGION, PLACEMENT OF VERAFLO WOUND VAC, WOUND WASHOUT,;  Surgeon: Raven Lewis MD;  [facility]    LASER CO2 EXCISE VULVA WIDE LOCAL  07/15/2014    Procedure: LASER CO2 EXCISE VULVA WIDE LOCAL;  Surgeon: Liliana Renteria MD;  [facility]    LASER CO2 VAGINA  07/17/2013    Procedure: LASER CO2 VAGINA;;  Surgeon: Liliana Renteria MD;  [facility]    LASER CO2 VAGINA N/A 09/25/2018    Procedure: LASER CO2 VAGINA;  Exam Under Anesthesia, CO2 Laser Ablation of Upper Vagina and Cervix;  Surgeon: Pati Garcia MD;  [facility]    MICROSCOPY ANAL  07/17/2013    Procedure: MICROSCOPY ANAL;  Anal Microscopy,  EUA vagina,Colposcopy Of Vagina And Vulva, Vaginal Biopsies, Omniguide Co2 Laser To Vagina and vulva, Loop Electrosurgical Excision Procedure To Cervix;  Surgeon: Radha Musa MD;  [facility]    MICROSCOPY ANAL  07/15/2014    Procedure: MICROSCOPY ANAL;  Surgeon: Radha Musa MD;  [facility]    PICC DOUBLE LUMEN PLACEMENT Right 04/30/2021    39cm, Basilic vein    PSEUDOANEURYSM REPAIR Right 6/27/2023    Procedure: RIGHT FEMORAL TO PROFUNDA FEMORIS ARTERY BYPASS WITH cadaveric femoral-popliteal artery, REPAIR OF RIGHT GROIN PSEUDOANEURYSM;  Surgeon: Abena Ferrara MD;  [facility]    TRANSESOPHAGEAL ECHOCARDIOGRAM INTRAOPERATIVE N/A 04/28/2021    Procedure: ECHOCARDIOGRAM, TRANSESOPHAGEAL, INTRAOPERATIVE;  Surgeon: GENERIC ANESTHESIA  PROVIDER;  Location: [facility]    VASCULAR SURGERY      Thrombectomy    ZC NONSPECIFIC PROCEDURE      Thrombectomy    Z NONSPECIFIC PROCEDURE   and     Bilater eye surgery - correction for crossed eyes    Z NONSPECIFIC PROCEDURE      oopherectomy L    [facility] NONSPECIFIC PROCEDURE  1967    open kidney biopsy - L    [facility] TRANSPLANTATION OF KIDNEY  2004    recipient -- done at [facility]             Social History:     Social History     Tobacco Use    Smoking status: Former     Packs/day: 0.30     Years: 35.00     Pack years: 10.50     Types: Cigarettes     Start date: 1967     Quit date: 3/1/2021     Years since quittin.5    Smokeless tobacco: Never    Tobacco comments:     States smokes once in a while   Substance Use Topics    Alcohol use: Not Currently     Comment: rarely             Family History:     Family History   Problem Relation Age of Onset    Diabetes Father     Alcohol/Drug Father     Arthritis Father     Hypertension Father     Lipids Father         high cholesterol    Arthritis Mother     Diabetes Mother     Depression Mother     Heart Disease Mother     Neurologic Disorder Mother     Obesity Mother     Psychotic Disorder Mother     Thyroid Disease Mother     Hypertension Mother     Gynecology Sister         Precancerous cell removal from cervix at age 45    Depression Sister     Allergies Sister     Alcohol/Drug Sister     Neurologic Disorder Sister     Cerebrovascular Disease Paternal Grandmother     Diabetes Paternal Grandmother     Alcohol/Drug Son     Colon Polyps Sister     Breast Cancer Niece     Other Cancer Sister         Cervical    Obesity Sister     Depression Sister     Substance Abuse Son     Substance Abuse Sister             Depression Sister     Asthma Other     Colon Cancer No family hx of     Crohn's Disease No family hx of     Ulcerative Colitis No family hx of     Melanoma No family hx of     Skin Cancer No family hx of                 Allergies:  "    Allergies   Allergen Reactions    Blood Transfusion Related (Informational Only) Other (See Comments)     Patient has a history of a clinically significant antibody against RBC antigens.  A delay in compatible RBCs may occur.    Tramadol-Acetaminophen Nausea and Vomiting and Hives    Hydrocodone Nausea and Vomiting and Hives             Medications:     Current Facility-Administered Medications   Medication    acetaminophen (TYLENOL) tablet 650 mg    Or    acetaminophen (TYLENOL) Suppository 650 mg    aspirin (ASA) chewable tablet 81 mg    atorvastatin (LIPITOR) tablet 80 mg    bisacodyl (DULCOLAX) suppository 10 mg    clopidogrel (PLAVIX) tablet 75 mg    hydrALAZINE (APRESOLINE) tablet 25 mg    ipratropium-albuterol (COMBIVENT RESPIMAT) inhaler 1 puff    isosorbide mononitrate (IMDUR) 24 hr tablet 60 mg    melatonin tablet 1 mg    metoprolol tartrate (LOPRESSOR) tablet 100 mg    metoprolol tartrate (LOPRESSOR) tablet 50 mg    naloxone (NARCAN) injection 0.2 mg    Or    naloxone (NARCAN) injection 0.4 mg    Or    naloxone (NARCAN) injection 0.2 mg    Or    naloxone (NARCAN) injection 0.4 mg    ondansetron (ZOFRAN ODT) ODT tab 4 mg    Or    ondansetron (ZOFRAN) injection 4 mg    oxyCODONE (ROXICODONE) tablet 5 mg    pantoprazole (PROTONIX) EC tablet 40 mg    polyethylene glycol (MIRALAX) Packet 17 g    predniSONE (DELTASONE) tablet 5 mg    senna-docusate (SENOKOT-S/PERICOLACE) 8.6-50 MG per tablet 1 tablet    Or    senna-docusate (SENOKOT-S/PERICOLACE) 8.6-50 MG per tablet 2 tablet    tacrolimus (GENERIC EQUIVALENT) capsule 1.5 mg               Review of Systems:   A 12 point review of systems was completed and found to be negative unless otherwise stated in the above HPI          Physical Exam:   BP (!) 150/62 (BP Location: Right arm)   Pulse 78   Temp 97.7  F (36.5  C) (Oral)   Resp 18   Ht 1.549 m (5' 1\")   Wt 39.5 kg (87 lb)   SpO2 98%   BMI 16.44 kg/m        Intake/Output Summary (Last 24 hours) at " 9/27/2023 1646  Last data filed at 9/26/2023 1700  Gross per 24 hour   Intake 240 ml   Output --   Net 240 ml     Exam  Frail and cachectic elderly female who appears older than her stated age  Bald  Dyspneic with conversation on supplemental oxygen  Abdomen is soft, nontender, nondistended, palpable abdominal mass  Palpable left femoral pulse and palpable right femoral graft pulse  Palpable DP pulses bilaterally, feet warm, normal capillary refill          Data:   All laboratory data reviewed    Results:  BMP  Recent Labs   Lab 09/27/23  0702 09/26/23  0619 09/24/23  0651 09/23/23  1143    137 133* 131*   POTASSIUM 4.4 4.7 4.9 4.3   CHLORIDE 107 107 99 97*   CO2 19* 19* 22 23   BUN 33.3* 45.5* 56.9* 60.1*   CR 1.81* 2.18* 2.64* 2.71*   GLC 93 91 88 126*     CBC  Recent Labs   Lab 09/27/23  0702 09/26/23  0619 09/24/23  0651 09/23/23  1143   WBC 7.5 8.8 11.5* 12.9*   HGB 8.3* 8.5* 9.1* 10.0*    246 235 282     LFT  Recent Labs   Lab 09/27/23  0702 09/23/23  1143   AST 14 14   ALT 8 10   ALKPHOS 106* 128*   BILITOTAL 0.3 0.4   ALBUMIN 2.4* 3.2*     Recent Labs   Lab 09/27/23  0702 09/26/23  0619 09/24/23  0651 09/23/23  1143   GLC 93 91 88 126*       Imaging:  XR Colon Water Soluble    Result Date: 9/26/2023  COLON WATER SOLUBLE  9/26/2023 3:11 PM HISTORY: Constipation; abdominal pain. COMPARISON: None. TECHNIQUE: Water soluble contrast was introduced into the colon through a rectal tube under gravity.  FLUOROSCOPY TIME: 3.1 minutes. SPOT FILMS: 8 FINDINGS: Contrast flowed easily into the colon.  The colon is widely patent from the rectum to the cecum. Small to moderate amount of stool. There was no reflux of contrast into the terminal ileum. There were no filling defects.     IMPRESSION: No obstruction demonstrated. ALEX LESTER MD   SYSTEM ID:  [id number]    CT Abdomen Pelvis w/o Contrast    Result Date: 9/23/2023  EXAM: CT ABDOMEN PELVIS W/O CONTRAST LOCATION: [facility] DATE:  9/23/2023 INDICATION: Severe epigastric pain. COMPARISON: CT 06/06/2023. TECHNIQUE: CT scan of the abdomen and pelvis was performed without IV contrast. Multiplanar reformats were obtained. Dose reduction techniques were used. CONTRAST: None. FINDINGS: LOWER CHEST: Prominent aneurysmal dilatation of the descending thoracic aorta measuring approximately 6.0 cm in greatest radial dimension. Minute bilateral pleural effusions. Small pericardial effusion. Mild to moderate atelectasis most marked in the right lower lobe. HEPATOBILIARY: Normal. PANCREAS: Slight calcification which may represent sequelae of chronic pancreatitis. Pancreas is otherwise normal with no changes of acute pancreatitis. SPLEEN: Single tiny calcification which may represent a calcified granuloma. ADRENAL GLANDS: Normal. KIDNEYS/BLADDER: Marked atrophy of both kidneys with no hydronephrosis. 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 BOWEL: Moderate findings of diverticulosis with no evidence for diverticulitis. The appendix is normal. LYMPH NODES: Normal. VASCULATURE: There is an endograft seen involving the infrarenal abdominal aorta and extending into the left common iliac artery. There is aneurysmal dilatation seen of the abdominal aorta most marked in the upper abdominal aorta and this measures approximately 5 cm in greatest AP dimension. No evidence for rupture. Prominence of the right groin soft tissues most typical for a postoperative seroma/pseudoaneurysm and this has decreased in size since 06/06/2023 which previously measured approximately 4 cm in greatest radial dimension. PELVIC ORGANS: Normal. MUSCULOSKELETAL: Moderate degenerative changes most marked at the L5-S1 interspace.     IMPRESSION: 1.  No significant changes since 06/06/2023 with again seen significant areas of aneurysmal dilatation most pronounced in the descending thoracic aorta. Postoperative changes to include an endograft. 2.  Findings most typical for a partially  resolved seroma/pseudoaneurysm in the right groin. 3.  Advanced atrophy of the native kidneys with transplant kidney seen in the left hemipelvis. 4.  Tiny pericardial effusion. 5.  Tiny bilateral pleural effusions with slight atelectasis.     XR Chest 2 Views    Result Date: 9/9/2023  EXAM: XR CHEST 2 VIEWS  9/9/2023 2:54 PM  HISTORY: pt w/ recent COVID, holding home diuretics, w/ new hypoxia this AM and chills COMPARISON: 9/5/2023 FINDINGS: Two views of the chest. Trachea is midline. Cardiac silhouette is within normal limits. Emphysematous changes in the lungs including hyperinflation and increased lucency throughout the lung fields, most prominent in the apices. Stable tortuous thoracic aorta with unchanged aneurysmal dilation. No focal consolidation. No pleural effusion or pneumothorax. Partially visualized aortic endograft projects over the left upper quadrant.     IMPRESSION: 1. No acute airspace disease. 2. Stable emphysematous changes in the lungs. I have personally reviewed the examination and initial interpretation and I agree with the findings. MARYAN WILLAMS MD   SYSTEM ID:  [id number]    US Lower Extremity Venous Duplex Bilateral    Result Date: 9/6/2023  EXAMINATION: DOPPLER VENOUS ULTRASOUND OF BILATERAL LOWER EXTREMITIES, 9/6/2023 4:26 PM COMPARISON: Ultrasound 8/30/2023 HISTORY: Patient with elevated d-dimer, some shortness of breath, assess for any clots in extremities. TECHNIQUE:  Gray-scale evaluation with compression, spectral flow and color Doppler assessment of the deep venous system of both legs from groin to knee, and then at the ankles. FINDINGS: In both lower extremities, the common femoral, femoral, popliteal and posterior tibial veins demonstrate normal compressibility and blood flow. 3.1 x 2.7 x 2.1 cm right groin heterogeneous hematoma without internal Doppler flow (previously measuring approximately 4.3 x 2.1 x 3.8 cm). Additional smaller mildly complex organized fluid collection  around the arterial bypass graft in the right groin region measuring 1.9 x 1.6 x 0.7 cm; mild internal septations without internal vascularity, likely representing liquefying hematoma rather than infectious collection     IMPRESSION: 1.  No evidence of deep venous thrombosis in either lower extremity. 2.  Mild decrease in the moderately sized right groin site hematoma measuring up to 3.1 cm, previously up to 4.3 cm. 3.  Additional smaller right groin site likely liquefying hematoma measuring 1.9 x 1.6 x 0.7 cm. Other considerations for this mildly complex collection including infection or seroma. I have personally reviewed the examination and initial interpretation and I agree with the findings. MAGALIE MARS MD   SYSTEM ID:  [id number]    US Upper Extremity Venous Duplex Bilat    Result Date: 2023  EXAMINATION: DOPPLER VENOUS ULTRASOUND OF BILATERAL UPPER EXTREMITIES, 2023 4:25 PM COMPARISON: None available HISTORY: Patient with elevated D-dimer, some SOB, wanting to assess if any clots in extremities TECHNIQUE:  Gray-scale evaluation with compression, spectral flow, and color Doppler assessment of the deep venous system of both upper extremities. FINDINGS: Normal blood flow and waveforms are demonstrated in the internal jugular, innominate, subclavian, and axillary veins bilaterally.     IMPRESSION: 1.  No evidence of deep venous thrombosis in in the internal jugular, innominate, subclavian and axillary veins bilaterally. I have personally reviewed the examination and initial interpretation and I agree with the findings. NORMA CALDERON MD   SYSTEM ID:  [id number]    Echo Limited    Result Date: 2023  [id number]^ROBSON^[facility],[place] [facility] [address] [place] [id number]  Name: ERASMO MERINO MRN: [id number] : 1950 Study Date: 2023 04:45 PM Age: 72 yrs Gender: Female Patient Location: [facility]  For Study: Dyspnea, Pericardial effusion Ordering Physician: SUKHWINDER CHANCE Performed By: Nancy Cohn  BSA: 1.3 m2 Height: 61 in Weight: 83 lb HR: 71 BP: 123/83 mmHg ______________________________________________________________________________ Procedure Limited Portable Echo Adult. Poor acoustic windows. ______________________________________________________________________________ Interpretation Summary Left ventricular function is decreased. The ejection fraction is 50-55% (borderline). Global right ventricular function is normal. Dilation of the inferior vena cava is present with abnormal respiratory variation in diameter. There is a mild to moderate-sized pericardial effusion anteriorly. The maximal depth is 1.6 cm adjacent to the right AV groove. There is no evidence of RA/RV diastolic collapse. There is organizing material in the pericardial space. Collectively, there are no high-risk signs of tamponade.  This study was compared with the study from 8/30/23. Minimal interval change. ______________________________________________________________________________ Left Ventricle Left ventricular function is decreased. The ejection fraction is 50-55% (borderline). Akinesis and thinning of the mid anteroseptal segment.  Right Ventricle The right ventricle is normal size. Global right ventricular function is normal.  Mitral Valve The mitral valve is normal.  Vessels Dilation of the inferior vena cava is present with abnormal respiratory variation in diameter.  Pericardium There is a mild to moderate-sized pericardial effusion anteriorly. The maximal depth is 1.6 cm adjacent to the right AV groove. There is no evidence of RA/RV diastolic collapse. There is organizing material in the pericardial space. Collectively, there are no high-risk signs of tamponade.  Compared to Previous Study This study was compared with the study from 8/30/23 .  ______________________________________________________________________________ Report approved by: Saúl Christina 09/05/2023 05:18 PM  ______________________________________________________________________________      XR Chest 2 Views    Result Date: 9/5/2023  EXAM: XR CHEST 2 VIEWS  9/5/2023 10:51 AM HISTORY:  SOB   COMPARISON:  Radiograph 8/30/2023, CTA 20/1/2023 FINDINGS: AP portable semiupright radiograph of chest. Trachea is midline. Hyperinflated lung fields. Emphysema is changes, most pronounced in the lung apices with associated right upper lobe scarring. Aneurysmal dilation of the thoracic aorta with tortuous appearance, overall similar to 8/30/2023. Pulmonary vasculature is within normal limits. No new focal airspace opacity. No subcutaneous pleural effusion. No appreciable pneumothorax. No acute osseous abnormality. Visualized upper abdomen is unremarkable.      IMPRESSION: 1. No acute airspace disease. 2. Sequelae of COPD with emphysematous changes and right upper lobe scarring. 3. Stable aneurysmal dilation of the thoracic aorta with tortuous appearance, better appreciated on CT chest 8/21/2023. I have personally reviewed the examination and initial interpretation and I agree with the findings. JOS AMOS,    SYSTEM ID:  [id number]    US Lower Extremity Venous Duplex Right    Result Date: 8/30/2023  EXAMINATION: US LOWER EXTREMITY VENOUS DUPLEX RIGHT  8/30/2023 9:10 PM  CLINICAL HISTORY: Pt with swelling below R ankle, r/o DVT COMPARISON: None    PROCEDURE COMMENTS: Ultrasound was performed of the deep venous system of the right lower extremity using grayscale, color, and spectral Doppler. FINDINGS: The right common femoral, greater saphenous origin, femoral, popliteal, and deep calf veins are visualized and are patent. Venous waveforms are normal. There is normal response to compression. Heterogeneous hematoma of the right groin measuring 4.3 x 2.1 x 3.8 cm without flow.     IMPRESSION:.  1. No deep vein thrombosis in the right lower extremity. 2. Hematoma of the right groin. I have personally reviewed the examination and initial interpretation and I agree with the findings. VERNON PIMENTEL MD   SYSTEM ID:  [id number]    Echocardiogram Complete    Result Date: 2023  [id number]^DARYL^MILADYS^MADAY  [facility] [facility] [address] [place] [id number]  Name: ERASMO MERINO MRN: [id number] : 1950 Study Date: 2023 01:34 PM Age: 72 yrs Gender: Female Patient Location: [facility] Reason For Study: SOB, CHF, Myocarditis Ordering Physician: MILADYS BRITO Performed By: Jeffry Barrientos  BSA: 1.3 m2 Height: 61 in Weight: 83 lb HR: 64 BP: 138/83 mmHg ______________________________________________________________________________ Procedure Complete Portable Echo Adult. Optison (NDC #4954-6155-64) given intravenously. Patient was given 6 ml mixture of 3 ml Optison and 6 ml saline. 3 ml wasted. ______________________________________________________________________________ Interpretation Summary Left ventricular function is decreased. The ejection fraction is 50-55% (borderline). Global right ventricular function is normal. The right ventricle is normal size. No significant valvular abnormalities. There is mild dilation at the level of the sinuses of Valsalva and ascending aorta when indexed to BSA (both 3.1 cm, indexed value 2.4 cm/m2). IVC diameter >2.1 cm collapsing <50% with sniff suggests a high RA pressure estimated at 15 mmHg or greater. There is a moderate-sized pericardial effusion anteriorly. The maximal depth is 1.6 cm adjacent to the right AV groove. There is no evidence of RA/RV diastolic collapse. There is organizing material in the pericardial space. Collectively, there are no high-risk signs of tamponade. This study was compared with the study from 2022. No significant changes upon direct visual  comparison. ______________________________________________________________________________ Left Ventricle Left ventricular size is normal. Mild concentric wall thickening consistent with left ventricular hypertrophy is present. Left ventricular function is decreased. The ejection fraction is 50-55% (borderline). There is thinning and akinesis of the mid inferoseptal and mid anteroseptal segments. Left ventricular diastolic function is indeterminate.  Right Ventricle Global right ventricular function is normal. The right ventricle is normal size.  Atria Both atria appear normal.  Mitral Valve The mitral valve is normal. Trace mitral insufficiency is present.  Aortic Valve The aortic valve is tricuspid. On Doppler interrogation, there is no significant stenosis or regurgitation.  Tricuspid Valve The tricuspid valve is normal. Trace tricuspid insufficiency is present. Pulmonary artery systolic pressure cannot be assessed.  Pulmonic Valve The valve leaflets are not well visualized. Trace pulmonic insufficiency is present.  Vessels There is mild dilation at the level of the sinuses of Valsalva and ascending aorta when indexed to BSA (both 3.1 cm, indexed value 2.4 cm/m2). IVC diameter >2.1 cm collapsing <50% with sniff suggests a high RA pressure estimated at 15 mmHg or greater.  Pericardium There is a moderate-sized pericardial effusion anteriorly. The maximal depth is 1.6 cm adjacent to the right AV groove. There is no evidence of RA/RV diastolic collapse. There is organizing material in the pericardial space. Collectively, there are no high-risk signs of tamponade.  Compared to Previous Study This study was compared with the study from 07/23/2022 . No significant changes upon direct visual comparison. ______________________________________________________________________________ MMode/2D Measurements & Calculations  IVSd: 1.00 cm LVIDd: 4.7 cm LVIDs: 3.5 cm LVPWd: 0.94 cm FS: 25.5 % LV mass(C)d: 156.0 grams LV  mass(C)dI: 120.0 grams/m2 Ao root diam: 3.1 cm asc Aorta Diam: 3.1 cm LVOT diam: 2.0 cm LVOT area: 3.1 cm2 Ao root diam Index (cm/m2): 2.4 asc Aorta Diam Index (cm/m2): 2.4 RWT: 0.40  Doppler Measurements & Calculations MV E max nicolas: 52.4 cm/sec MV A max nicolas: 61.8 cm/sec MV E/A: 0.85 MV dec slope: 244.0 cm/sec2 MV dec time: 0.21 sec Ao V2 max: 142.4 cm/sec Ao max P.1 mmHg Ao V2 mean: 106.2 cm/sec Ao mean P.0 mmHg Ao V2 VTI: 31.4 cm DALIA(I,D): 1.8 cm2 DALIA(V,D): 1.9 cm2 LV V1 max PG: 3.1 mmHg LV V1 max: 87.5 cm/sec LV V1 VTI: 18.3 cm  SV(LVOT): 56.5 ml SI(LVOT): 43.5 ml/m2 PA V2 max: 88.5 cm/sec PA max PG: 3.1 mmHg PA acc time: 0.13 sec AV Nicolas Ratio (DI): 0.61 DALIA Index (cm2/m2): 1.4 E/E' av.7 Lateral E/e': 8.6 Medial E/e': 10.7  ______________________________________________________________________________ Report approved by: Saúl Kiran 2023 04:41 PM       XR Chest Port 1 View    Result Date: 2023  EXAM: XR CHEST PORT 1 VIEW  2023 12:56 PM HISTORY:  covid+ sob and n/v and weakness   COMPARISON:  CT 2023. Chest radiograph 3-23 TECHNIQUE: 2 portable AP semiupright views of the chest FINDINGS: The trachea is midline. The cardiomediastinal silhouette is within normal limits. Atherosclerotic calcification of the aortic arch. Redemonstrated tortuous aneurysmal thoracic aorta. Hyperinflated lungs with upper lobe predominant emphysematous changes.. The pulmonary vasculature is distinct. No appreciable pneumothorax or pleural effusion. No focal airspace consolidation. No acute osseous abnormality.     IMPRESSION: No acute airspace disease. Stable radiographic changes of COPD. Enlarged tortuous descending thoracic aorta. I have personally reviewed the examination and initial interpretation and I agree with the findings. MONIK CRANE MD   SYSTEM ID:  [id number]    STAFF: History reviewed and recent CTA images reviewed.  Significant history of for chronic usage thoracicoabdominal  aneurysm that is not amenable to any surgical repair in this patient.  Celiac artery which takes off just beyond the aneurysm may have some compression but the SMA is widely patent.  In this particular patient I do not feel that she has medium arcuate ligament syndrome or mesenteric ischemia related to vascular issues.  I would not recommend any type of intervention on the celiac artery which would be very high risk with the aneurysm and the location.  Agree with GI consultation.    Over 45 minutes with patient with review of old records and present studies    Bobby Caldwell MD

## 2023-09-28 NOTE — PLAN OF CARE
Goal Outcome Evaluation:    PRIMARY Concern: abdominal pain  SAFETY RISK Concerns (fall risk, behaviors, etc.): fall      Tests/Procedures for NEXT shift:  None  Consults? (Pending/following, signed-off?) GI signed off. Nephro & Vascular following.  Where is patient from? (Home, TCU, etc.): home  Other Important info for NEXT shift: BP elevated. Scheduled metoprolol and Hydralazine given. Had some nausea and abdominal pain this shift.  Anticipated DC date & active delays: TBD    SUMMARY NOTE:            Orientation/Cognitive: AOX4  Observation Goals (Met/ Not Met): Inpt  Mobility Level/Assist Equipment: A1 gb/w  Antibiotics & Plan (IV/po, length of tx left):  NA  Pain Management: denies. PRN oxy and tylenol available. C/O of nausea and given zofran x1  Tele/VS/O2: VSS on 3L NC ex BP elevated.  ABNL Lab/BG: Creat- 1.81, Urea nitrogen- 33.3, Alkaline phos- 106. Hgb- 8.3  Diet: low fiber diet  Bowel/Bladder: continent. Up to BSC.  Skin Concerns: Blanchable redness to sacrum/ coccyx area. Mepilex in place    Drains/Devices: PIV SL

## 2023-09-28 NOTE — PROGRESS NOTES
PRIMARY Concern: abdominal pain  SAFETY RISK Concerns (fall risk, behaviors, etc.): fall      Tests/Procedures for NEXT shift:  None  Consults? (Pending/following, signed-off?) GI signed off. Nephro & Vascular following.  Where is patient from? (Home, TCU, etc.): home  Other Important info for NEXT shift: US mesenteric arteries completed, awaiting result. BP elevated. On scheduled hydralazine  Anticipated DC date & active delays: TBD. CC following for home care.    SUMMARY NOTE:            Orientation/Cognitive: AOX4  Observation Goals (Met/ Not Met): Inpt  Mobility Level/Assist Equipment: A1 gb/w  Antibiotics & Plan (IV/po, length of tx left):  NA  Pain Management: denies. PRN oxy and tylenol available.  Tele/VS/O2: VSS on 3L NC ex BP elevated.  ABNL Lab/BG: Creat- 1.81, Urea nitrogen- 33.3, Alkaline phos- 106. Hgb- 8.3  Diet: low fiber diet   Bowel/Bladder: continent. Up to BSC.  Skin Concerns: Blanchable redness to sacrum/ coccyx area. Mepilex in place    Drains/Devices: PIV SL

## 2023-09-28 NOTE — PROGRESS NOTES
"Vascular surgery progress note    Ate half a cup of tomato soup and half a chicken salad sandwich last evening with diarrhea shortly thereafter.  No abdominal pain until sharp epigastric pain approximately 8 hours later, improved with hot pack.  Tolerated Jell-O and, weight this morning with no pain or GI upset.    BP (!) 166/99 (BP Location: Right arm)   Pulse 82   Temp 97.5  F (36.4  C) (Oral)   Resp 18   Ht 1.549 m (5' 1\")   Wt 42.2 kg (93 lb 0.6 oz)   SpO2 97%   BMI 17.58 kg/m      Exam  Resting calmly in bed, awake, alert, appropriate  Abdomen is soft, entirely nontender, nondistended, pulsatile abdominal mass  Palpable femoral pulses.    CRP 72, increased from 62 on 9/23/2023.  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  Ultrasound demonstrated celiac artery PSV of 362 cm/s and suggest approximately 7% stenosis of the SMA with  cm/s.      Assessment/plan:  72 y.o. 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    Vascular surgery now consulted for several weeks of post-prandial and nocturnal epigastric pain that began after her recent COVID diagnosis.  She does have known severe stenosis of the celiac artery and significant stenosis of the SMA demonstrated on mesenteric arterial duplex ultrasound yesterday.  Also noted was apparent celiac artery compression and postnecrotic dilation on CTA from June 2023.  Despite these anatomic findings was unclear if she truly has a component " of median arcuate ligament syndrome or chronic mesenteric artery ischemia, as her elevated inflammatory markers and diarrhea would suggest a non-vascular cause of abdominal pain.    We would recommend continued evaluation by gastroenterology with repeat upper and lower endoscopy.  She will follow-up in vascular clinic as an outpatient.    Vascular surgery will sign off.  Please call with questions.    Discussed with staff, Dr. Caldwell.    Larry Thornton MD

## 2023-09-28 NOTE — PROVIDER NOTIFICATION
MD Notification    Notified Person: MD    Notified Person Name:Dr Morales    Notification Date/Time:1709 9/28/23    Notification Interaction:vocera    Purpose of Notification:pt is requesting Imodium for loose stool. ALSO, FYI pt has increase in edema in bilateral ankles and feet, +2. keeping feet/legs elevated.    Orders Received:    Comments:Per Dr Morales, monitor for diarrhea for now

## 2023-09-28 NOTE — PROVIDER NOTIFICATION
MD Notification    Notified Person: MD    Notified Person Name:Dr Morales    Notification Date/Time:1450 9/28/23    Notification Interaction:vocera    Purpose of Notification:  FYI, phosphorus 2.3 today, need to replace? thanks     Orders Received:    Comments:     [FreeTextEntry1] : Wound cleansed today, slightly debrided and dressed w/xeroform/dry gauze/ACE after moisturizing surrounding skin w/Vitamin A&D

## 2023-09-28 NOTE — PROGRESS NOTES
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     Mid abdominal pain-nocturnal  Peripheral vascular disease  Aneurysmal dilation of the ascending thoracic aorta measuring 6 cm  AAA, S/p endovascular aneurysm repair and femorofemoral bypass, 4/2021  S/p right femoral to profunda femoris artery bypass and repair of right femoral pseudoaneurysm, 6/2023  Within the ED, mild leukocytosis,elevated CRP of 62 (up from 22 two weeks ago), Lactate normal at 1.  Epigastric tenderness on exam.  LFTs at baseline.  Lipase normal. CT scan did not show any cause to explain the pain  *Last EGD was in February 2023. Largely unremarkable   *Last colonoscopy was in July 2022 that showed moderately congested mucosa in transverse colon, ascending colon and cecum, diffuse mild inflammation in ascending colon and cecum, diverticulosis of sigmoid colon.  Biopsies showed chronic inflammation and patchy fibrosis.  *during admission [facility] 8/30 had neg enteric panel, positive C.diff but antigen test negative suggestive of colonization. PCP had ordered repeat Cdiff at outpatient visit 9/23, stool not collected  *Cause of abdominal pain is unclear.    - pain etiology continues to remain unclear. Ongoing PRN diarrhea in the hospital. No blood but she does notice mucus.  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  Recent tacrolimus level was elevated at 8.3 on 9/7.   *Follows up with transplant nephrology  *Baseline creatinine up to 1.9.     - nephrology consulted and following, remains on IVF as we follow Cr improvement  - continue to hold lisinopril and torsemide  - continue PTA prednisone and tac, trough pending today     Hypomagnesemia, magnesium 1.4  - replacement protocol     Coronary artery disease, s/p inferior STEMI, s/p RCA stenting, 4/2021  Chronic elevation of troponin  Chronic diastolic CHF with recovered EF  Essential hypertension  -On DAPT, Imdur, hydralazine, metoprolol, statin  -Troponin elevated at 40, 39.  This is her baseline.  Trend is flat  - remains on home meds with some increases due to elevated BP     Recent COVID-19 infection with bilateral pneumonitis with acute hypoxic respiratory failure  -Tested positive on 8/30.  Hospitalized at [facility] from 9/5-9/10.  Treated with IV remdesivir and dexamethasone.  Was discharged on room air.  -currently continues with oxygen use for comfort, sats 92% on room air   -Isolation precautions are not needed     Generalized weakness  -During recent stay at [facility], PT OT expressed concerns about patient returning home and her ability to complete ADLs.  Patient declined home PT. reports she was able to do ADLs until day prior to admission   -Consult PT     Small pericardial effusion without tamponade  -Limited echo 9/5 showed EF 50-55% normal RV function, mild-moderate pericardial effusion of 1.6 cm without evidence of tamponade.  -CT scan on admission mentioned tiny pericardial effusion  -outpatient follow up     Small cell carcinoma of right lung, s/p SBRT, 2014  Squamous cell carcinoma of anus, s/p full-thickness excision and chemoradiation, 2018  -Stable     Anemia of chronic renal disease  -Hemoglobin stable ~10.    "  COPD  -No acute exacerbation  -Continue Combivent inhaler 4 times daily     Diet: Snacks/Supplements Adult: Ensure Enlive; With Meals  Advance Diet as Tolerated: Full Liquid Diet    DVT Prophylaxis: Pneumatic Compression Devices, already on DAPT   Villavicencio Catheter: Not present  Lines: None     Cardiac Monitoring: None  Code Status: Full Code          Clinically Significant Risk Factors Present on Admission             # Hypomagnesemia: Lowest Mg = 1.4 mg/dL in last 2 days, will replace as needed   # Hypoalbuminemia: Lowest albumin = 3.2 g/dL at 9/23/2023 11:43 AM, will monitor as appropriate   # Drug Induced Platelet Defect: home medication list includes an antiplatelet medication  # Acute Kidney Injury, unspecified: based on a >150% or 0.3 mg/dL increase in last creatinine compared to past 90 day average, will monitor renal function  # Hypertension: Noted on problem list  # Chronic heart failure with preserved ejection fraction: heart failure noted on problem list and last echo with EF >50%     # Cachexia: Estimated body mass index is 16.44 kg/m  as calculated from the following:    Height as of this encounter: 1.549 m (5' 1\").    Weight as of this encounter: 39.5 kg (87 lb).       # COPD: noted on problem list      Disposition Plan     Expected Discharge Date: 09/27/2023        Discharge Comments: PT recommends TCU vs home wit assist and home PT  pt denies TCU would like to go home. Per PT pt should have 24/7 home assist and PT to improve mobility/strengthening  [facility] accepted referal       Interval History   Doing well but did have some pain and bloating over the night. Pain is worse after eating and at night. No blood noted. She reports diarrhea in outpatient setting not constipation and is frustrated being told of constipation    -Data reviewed today: I reviewed all new labs and imaging over the last 24 hours. I personally reviewed.    Physical Exam    , Blood pressure (!) 166/116, pulse 84, " "temperature 97.5  F (36.4  C), temperature source Oral, resp. rate 18, height 1.549 m (5' 1\"), weight 42.2 kg (93 lb 0.6 oz), SpO2 97 %, not currently breastfeeding.  Vitals:    09/24/23 0331 09/25/23 0711 09/28/23 0650   Weight: 37.3 kg (82 lb 4.8 oz) 39.5 kg (87 lb) 42.2 kg (93 lb 0.6 oz)     Vital Signs with Ranges  Temp:  [97.4  F (36.3  C)-97.7  F (36.5  C)] 97.5  F (36.4  C)  Pulse:  [78-84] 84  Resp:  [18] 18  BP: (150-166)/() 166/116  SpO2:  [97 %-98 %] 97 %  I/O's Last 24 hours  I/O last 3 completed shifts:  In: -   Out: 100 [Urine:100]    Constitutional: Awake, alert, cooperative, no apparent distress  Respiratory: Clear to auscultation bilaterally, no crackles or wheezing  Cardiovascular: Regular rate and rhythm, normal S1 and S2, and no murmur noted, +1 edema noted pretibial  GI: Normal bowel sounds, soft, non-distended, no TTP on my assessment  Skin/Integumen: No rashes, no cyanosis,  Other:      Medications   All medications were reviewed.       aspirin  81 mg Oral Daily    atorvastatin  80 mg Oral Daily    clopidogrel  75 mg Oral Daily    hydrALAZINE  25 mg Oral TID    ipratropium-albuterol  1 puff Inhalation 4x Daily    isosorbide mononitrate  60 mg Oral Daily    metoprolol tartrate  100 mg Oral Daily    metoprolol tartrate  50 mg Oral QPM    pantoprazole  40 mg Oral QAM AC    polyethylene glycol  17 g Oral Daily    predniSONE  5 mg Oral Daily    senna-docusate  1 tablet Oral BID    Or    senna-docusate  2 tablet Oral BID    tacrolimus  1.5 mg Oral BID        Data   Recent Labs   Lab 09/28/23  0655 09/27/23  0702 09/26/23  0619 09/24/23  0651 09/23/23  1651 09/23/23  1143   WBC  --  7.5 8.8 11.5*  --  12.9*   HGB  --  8.3* 8.5* 9.1*  --  10.0*   MCV  --  83 83 83  --  80   PLT  --  234 246 235  --  282    136 137 133*  --  131*   POTASSIUM 4.4 4.4 4.7 4.9  --  4.3   CHLORIDE 105 107 107 99  --  97*   CO2 21* 19* 19* 22  --  23   BUN 34.5* 33.3* 45.5* 56.9*  --  60.1*   CR 2.22* 1.81* " 2.18* 2.64*  --  2.71*   ANIONGAP 10 10 11 12  --  11   KAYKAY 8.6* 8.3* 8.5* 8.7*  --  9.3   GLC 83 93 91 88  --  126*   ALBUMIN 2.6* 2.4*  --   --   --  3.2*   PROTTOTAL  --  5.2*  --   --   --  6.3*   BILITOTAL  --  0.3  --   --   --  0.4   ALKPHOS  --  106*  --   --   --  128*   ALT  --  8  --   --   --  10   AST  --  14  --   --   --  14   LIPASE  --   --   --   --  19 19         Recent Results (from the past 24 hour(s))   US Mesenteric Arteries Complete    Narrative    EXAM: Mesenteric artery duplex  LOCATION: [facility]  DATE/SUKHI: 9/27/2023 7:29 PM CDT    INDICATION: History of median arcuate ligament syndrome, known abdominal aortic aneurysm, pain.      TECHNIQUE: Duplex imaging is performed utilizing gray-scale, two-dimensional images, and color-flow imaging. Doppler waveform analysis and spectral Doppler imaging is also performed.    FINDINGS:     PROXIMAL AORTA:  Peak systolic velocity: 45 cm/s  Diameter: 5.1 cm    CELIAC ARTERY:  Peak systolic velocity with normal respiration: 362 cm/s  Peak systolic velocity with full inspiration: 378 cm/s  Peak systolic velocity with full expiration: 355 cm/s  Celiac aortic ratio: 8.0    Hepatic Artery: Peak systolic velocity: 88 cm/s  Splenic Artery: Peak systolic velocity: 65 cm/s    SMA:  Peak systolic velocity at origin 279 cm/s  Peak systolic velocity in the distal SMA trunk: 135 cm/s  SMA/aortic ratio: 6.2      Impression    IMPRESSION:  1. Suprarenal aortic aneurysm that measures up to 5.1 cm, better visualized on the recent abdominal CT (09/23/2023)    2. Fixed, greater than than 70% celiac artery stenosis.    3. Elevated SMA velocities suggestive of greater than 70% stenosis.

## 2023-09-28 NOTE — PROGRESS NOTES
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     AMY, nonoliguric  Recent COVID infection with poor PO intake. Prerenal AMY, improved with IVF. CT abdomen with normal-appearing transplanted kidney. Slight bump in Cr since IVF discontinued 9/27. Pt trying to maintain PO intake, ok to hold fluids for now. Appears more euvolemic today.   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  Baseline creatinine 1.6-1.9, however after recent admission discharged with a creatinine of 2.1.  During recent admission notably had elevated Tac level, dose was decreased to 1.5 mg twice daily.  Patient reports she was taking 2 mg twice daily at home, unaware of this change. Tac level on 9/24 elevated at 13, however was not a true trough. Two doses held before resuming 1.5mg BID.   - tac trough pending today (goal 4-6)   - continue PTA prednisone 5mg daily   - continue tacrolimus 1.5mg BID     Abdominal pain   CT without acute pathology. She is constipated. Workup per primary team, vasc surgery, and GI. If IV contrast study would be helpful to evaluate for certain etiologies including mesenteric ischemia then now would be ideal time to get study done with improved AMY and euvolemic state. Cosyntropin stim test performed.     CAD  Chronic diastolic CHF  HTN  Continue PTA Imdur hydralazine metoprolol. Diastolic pressures slightly high, med dose increased per primary team.      Small pericardial effusion without tamponade  Noted on TTE from previous admission.  CT this admission with tiny pericardial effusion.   "No evidence of tamponade at this time.     PVD  AAA s/p endovascular aneurysm repair and femorofemoral bypass  S/p right femoral to profunda femoris artery bypass and repair of right femoral pseudoaneurysm     Small cell carcinoma right lung  Squamous cell carcinoma of anus s/p full-thickness excision and chemoradiation     Anemia of renal disease   Hemoglobin trending down slightly, now in 8s.       Chiki Peterson MD   [facility]  Office: [phone]          Interval History:     IVF stopped yesterday, slight bump in Cr today   Feels ok   Has some SOB, but stable   Continues to have abd pain and limited PO intake though is trying to increase fluid intake as able   Denies n/v   Hoping to hear from vasc surgery regarding her abd pain          Medications and Allergies:      aspirin  81 mg Oral Daily    atorvastatin  80 mg Oral Daily    clopidogrel  75 mg Oral Daily    hydrALAZINE  25 mg Oral TID    ipratropium-albuterol  1 puff Inhalation 4x Daily    isosorbide mononitrate  60 mg Oral Daily    metoprolol tartrate  100 mg Oral Daily    metoprolol tartrate  50 mg Oral QPM    pantoprazole  40 mg Oral QAM AC    polyethylene glycol  17 g Oral Daily    predniSONE  5 mg Oral Daily    senna-docusate  1 tablet Oral BID    Or    senna-docusate  2 tablet Oral BID    tacrolimus  1.5 mg Oral BID        Allergies   Allergen Reactions    Blood Transfusion Related (Informational Only) Other (See Comments)     Patient has a history of a clinically significant antibody against RBC antigens.  A delay in compatible RBCs may occur.    Tramadol-Acetaminophen Nausea and Vomiting and Hives    Hydrocodone Nausea and Vomiting and Hives            Physical Exam:   Vitals were reviewed  BP (!) 166/116 (BP Location: Left arm)   Pulse 84   Temp 97.5  F (36.4  C) (Oral)   Resp 18   Ht 1.549 m (5' 1\")   Wt 42.2 kg (93 lb 0.6 oz)   SpO2 97%   BMI 17.58 kg/m      Wt Readings from Last 3 Encounters:   09/28/23 42.2 kg (93 lb " 0.6 oz)   09/10/23 34.5 kg (76 lb)   08/30/23 37.6 kg (83 lb)       Intake/Output Summary (Last 24 hours) at 9/27/2023 1344  Last data filed at 9/26/2023 1700  Gross per 24 hour   Intake 240 ml   Output --   Net 240 ml       GENERAL APPEARANCE: NAD, chronically ill and thin appearing   HEENT: normocephalic, slightly dry MM   RESP: clear  CV: RRR  EXTREMITIES/SKIN: no c/c/rashes/lesions; no edema  NEURO:  alert, oriente,d normal speech           Data:     BMP  Recent Labs   Lab 09/28/23  0655 09/27/23  0702 09/26/23  0619 09/24/23  0651    136 137 133*   POTASSIUM 4.4 4.4 4.7 4.9   CHLORIDE 105 107 107 99   KAYKAY 8.6* 8.3* 8.5* 8.7*   CO2 21* 19* 19* 22   BUN 34.5* 33.3* 45.5* 56.9*   CR 2.22* 1.81* 2.18* 2.64*   GLC 83 93 91 88       CBC  Recent Labs   Lab 09/27/23  0702 09/26/23  0619 09/24/23  0651 09/23/23  1143   WBC 7.5 8.8 11.5* 12.9*   HGB 8.3* 8.5* 9.1* 10.0*   HCT 26.4* 27.6* 28.7* 30.4*   MCV 83 83 83 80    246 235 282       Lab Results   Component Value Date    AST 14 09/27/2023    ALT 8 09/27/2023    GGT 76 (H) 06/23/2021    ALKPHOS 106 (H) 09/27/2023    BILITOTAL 0.3 09/27/2023    BILICONJ 0.0 12/15/2009    BILIDIRECT .0@ 03/10/2005     Lab Results   Component Value Date    INR 1.05 08/30/2023     Color Urine (no units)   Date Value   09/25/2023 Light Yellow   06/23/2021 Straw     Appearance Urine (no units)   Date Value   09/25/2023 Clear   06/23/2021 Clear     Glucose Urine (mg/dL)   Date Value   09/25/2023 Negative   06/23/2021 Negative     Bilirubin Urine (no units)   Date Value   09/25/2023 Negative   06/23/2021 Negative     Ketones Urine (mg/dL)   Date Value   09/25/2023 Negative   06/23/2021 Negative     Specific Gravity Urine (no units)   Date Value   09/25/2023 1.012   06/23/2021 1.010     pH Urine   Date Value   09/25/2023 5.5   06/23/2021 6.5 pH     Protein Albumin Urine (mg/dL)   Date Value   09/25/2023 50 (A)   06/23/2021 Negative     Urobilinogen Urine (EU/dL)   Date Value    02/24/2006 0.2     Nitrite Urine (no units)   Date Value   09/25/2023 Negative   06/23/2021 Negative     Leukocyte Esterase Urine (no units)   Date Value   09/25/2023 Trace (A)   06/23/2021 Negative         Attestation:  I have reviewed today's vital signs, notes, medications, labs and imaging.    Chiki Peterson MD  [facility] - Nephrology  Office: [phone]

## 2023-09-29 NOTE — PROGRESS NOTES
Renal Medicine Progress Note                                Maribel Yang MRN# [id number]   Age: 72 year old YOB: 1950   Date of Admission: 9/23/2023 Hospital LOS: 4                  Assessment/Plan:     AMY, nonoliguric    Recent COVID infection with poor PO intake. Prerenal AMY, improved with IVF. CT abdomen with normal-appearing transplanted kidney. Slight bump in Cr since IVF discontinued 9/27.      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  Baseline creatinine 1.6-1.9, however after recent admission discharged with a creatinine of 2.1.  During recent admission notably had elevated Tac level, dose was decreased to 1.5 mg twice daily.  Patient reports she was taking 2 mg twice daily at home, unaware of this change. Tac level on 9/24 elevated at 13, however was not a true trough. Two doses held before resuming 1.5mg BID.   - tac trough 10.3  - continue PTA prednisone 5mg daily   - continue tacrolimus 1.5mg BID      Abdominal pain   CT without acute pathology. She is constipated. Workup per primary team, vasc surgery, and GI. If IV contrast study would be helpful to evaluate for certain etiologies including mesenteric ischemia then now would be ideal time to get study done with improved AMY and euvolemic state. Cosyntropin stim test performed.      CAD  Chronic diastolic CHF  HTN  Continue PTA Imdur hydralazine metoprolol. Diastolic pressures slightly high, med dose increased per primary team.      Small pericardial effusion without tamponade  Noted on TTE from previous admission.  CT this admission with tiny pericardial effusion.  No evidence of tamponade at this time.     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       Continued improvement in renal function  Tac level OK  Repeat several days    Single dose torsemide today for edema       Interval History:     Patient awake and alert  Appears comfortable  Follows    Concerned regarding developing edema     IO reviewed  + 4600  UO incomplete     ROS:     GENERAL: NAD, No fever,chills  R: NEGATIVE for significant cough or SOB  CV: NEGATIVE for chest pain, palpitations  MUSCULOSKELETAL: edema  ROS otherwise negative    Medications and Allergies:     Reviewed    Physical Exam:     Vitals were reviewed  Patient Vitals for the past 12 hrs:   BP Temp Temp src Pulse Resp SpO2   09/29/23 1125 98/59 97.7  F (36.5  C) Oral 77 16 90 %   09/29/23 1100 -- -- -- -- -- 93 %   09/29/23 0754 (!) 156/114 97.2  F (36.2  C) Oral 91 16 96 %     Wt Readings from Last 4 Encounters:   09/28/23 42.2 kg (93 lb 0.6 oz)   09/10/23 34.5 kg (76 lb)   08/30/23 37.6 kg (83 lb)   07/19/23 37.2 kg (82 lb)     I/O last 3 completed shifts:  In: 480 [P.O.:480]  Out: -     Vitals:    09/23/23 1545 09/24/23 0331 09/25/23 0711 09/28/23 0650   Weight: 34 kg (75 lb) 37.3 kg (82 lb 4.8 oz) 39.5 kg (87 lb) 42.2 kg (93 lb 0.6 oz)         GENERAL: awake, alert, follows  HEENT: NC/AT, PERRLA, EOMI, non icteric, pharynx moist without lesion  RESP:  clear anteriorly  CV: RRR, normal S1 S2  ABDOMEN: soft, nontender, no HSM or masses and bowel sounds normal  MS: no clubbing, cyanosis   SKIN: clear without significant rashes or lesions  NEURO: speech normal and cranial nerves 2-12 intact  PSYCH: affect normal/bright  EXT: 1 + bilateral edema feet/lower leg    Data:     Recent Labs   Lab 09/29/23  0611 09/28/23  0655 09/27/23  0702 09/26/23  0619   * 136 136 137   POTASSIUM 4.7 4.4 4.4 4.7   CHLORIDE 103 105 107 107   CO2 20* 21* 19* 19*   ANIONGAP 11 10 10 11   GLC 95 83 93 91   BUN 31.0* 34.5* 33.3* 45.5*   CR 2.09* 2.22* 1.81* 2.18*    GFRESTIMATED 25* 23* 30* 24*   KAYKAY 8.2* 8.6* 8.3* 8.5*         Recent Labs   Lab 09/29/23  0611   *   POTASSIUM 4.7   CHLORIDE 103   CO2 20*   ANIONGAP 11   GLC 95   BUN 31.0*   CR 2.09*   GFRESTIMATED 25*   KAYKAY 8.2*     Recent Labs   Lab 09/29/23  0611 09/28/23  0655 09/27/23  0702 09/26/23  0619 09/24/23  0651 09/23/23  1143   CR 2.09* 2.22* 1.81* 2.18* 2.64* 2.71*         G Clarke Javed MD    [facility] - Nephrology  [phone]

## 2023-09-29 NOTE — PLAN OF CARE
Goal Outcome Evaluation:      Plan of Care Reviewed With: patient    Overall Patient Progress: no changeOverall Patient Progress: no change    Shift:8242-4146 9/28/23  PRIMARY Concern: abd pain   Tests/Procedures for next shift: none   Safety Risk CONCERNS: fall risk, pt refused bed alarm, education provided.                   (fall risk, behaviors, etc.)  Where is patient from? (Home, TCU, etc.): home w/ spouse   Other Important info for next shift: vascular surgery signed off, GI consult pending   Anticipated DC date & active delays: pending   SUMMARY NOTE:  Orientation/Cognitive: A/Ox4   Observation Goals (Met/ Not Met): inpt   Mobility Level/Assist Equipment: SBA w/walker   Pain Management: abd pain   Tele/VS/O2: VSS on 3L O2 NC   ABNL Lab/BG: Phos 2.3  Diet: low fiber   Bowel/Bladder: up to command, Loose stool x1 this shift   Skin Concerns: Blanchable redness to coccyx, +2 swelling to BLE  Drains/Devices: IV SL   Patient Stated Goal for Today: pain improvement

## 2023-09-29 NOTE — PLAN OF CARE
Goal Outcome Evaluation:      Plan of Care Reviewed With: patient    Overall Patient Progress: improving       PRIMARY Concern: Abdominal pain  SAFETY RISK Concerns:fall risk  Tests/Procedures for NEXT shift:None  Consults? GI signed off. Nephro following & Vascular signed off, vascular recommends G.I should continue to follow and possibly repeat upper and lower endoscopy[y  Where is patient from?Home  Other Important info for NEXT shift: BP elevated,on call MD notified, ordered one time dose of Nifedipine, same given. Received Hydralazine the previous shift.   Anticipated DC date & active delays: TBD     SUMMARY NOTE:            Orientation/Cognitive: A&OX4  Observation Goals (Met/ Not Met): Inpt  Mobility Level/Assist Equipment: Ax1 gb/w, refusing bed alarms, education and risks discussed, pt insists, says she only need to get to the commode fast  Antibiotics & Plan:  NA  Pain Management: Reported some abd pain and requested Tylenol, same given with good effect   Tele/VS/O2: VSS on 3L NC ex elevated B.P.  ABNL Lab/BG: Creat 2.2, phosphorus 2.3 replaced, CRP 72.65  Diet: low fiber diet  Bowel/Bladder: Continent,up to BSC.  Skin Concerns: Blanchable redness to sacrum/ coccyx area. Mepilex in place    Drains/Devices: PIV SL  Stated goal for today-Rest

## 2023-09-29 NOTE — PROGRESS NOTES
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     Mid abdominal pain-nocturnal  Peripheral vascular disease  Aneurysmal dilation of the ascending thoracic aorta measuring 6 cm  AAA, S/p endovascular aneurysm repair and femorofemoral bypass, 4/2021  S/p right femoral to profunda femoris artery bypass and repair of right femoral pseudoaneurysm, 6/2023  Within the ED, mild leukocytosis,elevated CRP of 62 (up from 22 two weeks ago), Lactate normal at 1.  Epigastric tenderness on exam.  LFTs at baseline.  Lipase normal. CT scan did not show any cause to explain the pain.  *Last EGD was in February 2023. Largely unremarkable   *Last colonoscopy was in July 2022 that showed moderately congested mucosa in transverse colon, ascending colon and cecum, diffuse mild inflammation in ascending colon and cecum, diverticulosis of sigmoid colon.  Biopsies showed chronic inflammation and patchy fibrosis.  *during admission [facility] 8/30 had neg enteric panel, positive C.diff but antigen test negative suggestive of colonization. PCP had ordered repeat Cdiff at outpatient visit 9/23, stool not collected    - appreciate follow up today from vascular surgery on results of doppler. Patient satisfied with the answers she received yesterday. Plan to continue to monitor her stenosis with outpatient follow up  - only minimal pain last night that was relieved with tylenol and heat pack. 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  Recent tacrolimus level was elevated at 8.3 on 9/7.   *Follows up with transplant nephrology  *Baseline creatinine up to 1.9.     - nephrology consulted and following. Off IVF now however lisinopril and torsemide remain on hold and more edema today. Ideally would resume home torsemide however defer resumption to nephrology  - continue PTA prednisone and tac, tac low normal on trough yesterday     Hypomagnesemia, magnesium 1.4  - replacement protocol     Coronary artery disease, s/p inferior STEMI, s/p RCA stenting, 4/2021  Chronic elevation of troponin  Chronic diastolic CHF with recovered EF  Essential hypertension  -On DAPT, Imdur, hydralazine, metoprolol, statin  -Troponin elevated at 40, 39. Trend is flat and at baseline  - remains on home meds increased hydralazine     Recent COVID-19 infection with bilateral pneumonitis with acute hypoxic respiratory failure  -Tested positive on 8/30.  Hospitalized at [facility] from 9/5-9/10.  Treated with IV remdesivir and dexamethasone.  - currently continues with oxygen use for comfort, sats 92% on room air   - check true oxygen levels prior to discharge     Generalized weakness  -During recent stay at [facility], PT OT expressed concerns about patient returning home and her ability to complete ADLs.  Patient declined home PT. reports she was able to do ADLs until day prior to admission   -home health recommended     Small pericardial effusion without tamponade  -Limited echo 9/5 showed EF 50-55% normal RV function, mild-moderate pericardial effusion of 1.6 cm without evidence of tamponade.  -CT scan on admission mentioned tiny pericardial effusion  -outpatient follow up     Small  "cell carcinoma of right lung, s/p SBRT, 2014  Squamous cell carcinoma of anus, s/p full-thickness excision and chemoradiation, 2018  -Stable     Anemia of chronic renal disease  -Hemoglobin stable ~10.     COPD  -Continue Combivent inhaler 4 times daily     Diet: Snacks/Supplements Adult: Ensure Enlive; With Meals  Advance Diet as Tolerated: Full Liquid Diet    DVT Prophylaxis: Pneumatic Compression Devices, already on DAPT   Villavicencio Catheter: Not present  Lines: None     Cardiac Monitoring: None  Code Status: Full Code          Clinically Significant Risk Factors Present on Admission             # Hypomagnesemia: Lowest Mg = 1.4 mg/dL in last 2 days, will replace as needed   # Hypoalbuminemia: Lowest albumin = 3.2 g/dL at 9/23/2023 11:43 AM, will monitor as appropriate   # Drug Induced Platelet Defect: home medication list includes an antiplatelet medication  # Acute Kidney Injury, unspecified: based on a >150% or 0.3 mg/dL increase in last creatinine compared to past 90 day average, will monitor renal function  # Hypertension: Noted on problem list  # Chronic heart failure with preserved ejection fraction: heart failure noted on problem list and last echo with EF >50%     # Cachexia: Estimated body mass index is 16.44 kg/m  as calculated from the following:    Height as of this encounter: 1.549 m (5' 1\").    Weight as of this encounter: 39.5 kg (87 lb).       # COPD: noted on problem list      Disposition Plan     Expected Discharge Date: 09/27/2023        Discharge Comments: PT recommends TCU vs home wit assist and home PT  pt denies TCU would like to go home. Per PT pt should have 24/7 home assist and PT to improve mobility/strengthening  [facility] accepted referal       Medical Decision Making       45 MINUTES SPENT BY ME on the date of service doing chart review, history, exam, documentation & further activities per the note.       Interval History   Patient with episode of pain last night that " "was not severe and relieved with tylenol. 1 episode of diarrhea yesterday but nothing since then. Legs are swollen and she would like to restart her torsemide. Breathing is heavy but mostly unchanged.    -Data reviewed today: I reviewed all new labs and imaging over the last 24 hours. I personally reviewed.    Physical Exam    , Blood pressure (!) 156/114, pulse 91, temperature 97.2  F (36.2  C), temperature source Oral, resp. rate 16, height 1.549 m (5' 1\"), weight 42.2 kg (93 lb 0.6 oz), SpO2 93 %, not currently breastfeeding.  Vitals:    09/24/23 0331 09/25/23 0711 09/28/23 0650   Weight: 37.3 kg (82 lb 4.8 oz) 39.5 kg (87 lb) 42.2 kg (93 lb 0.6 oz)     Vital Signs with Ranges  Temp:  [97.2  F (36.2  C)-98  F (36.7  C)] 97.2  F (36.2  C)  Pulse:  [82-92] 91  Resp:  [16-18] 16  BP: (143-173)/() 156/114  SpO2:  [93 %-99 %] 93 %  I/O's Last 24 hours  I/O last 3 completed shifts:  In: 480 [P.O.:480]  Out: -     Constitutional: Awake, alert, cooperative, no apparent distress  Respiratory: Clear to auscultation bilaterally, no crackles or wheezing  Cardiovascular: Regular rate and rhythm, normal S1 and S2, and no murmur noted, +2 edema noted pretibial and swollen feet much worse than yesterday  GI: Normal bowel sounds, soft, non-distended, no TTP on my assessment  Skin/Integumen: No rashes, no cyanosis,  Other:      Medications   All medications were reviewed.       acetaminophen  1,000 mg Oral At Bedtime    aspirin  81 mg Oral Daily    atorvastatin  80 mg Oral Daily    clopidogrel  75 mg Oral Daily    hydrALAZINE  50 mg Oral TID    ipratropium-albuterol  1 puff Inhalation 4x Daily    isosorbide mononitrate  60 mg Oral Daily    metoprolol tartrate  100 mg Oral Daily    metoprolol tartrate  50 mg Oral QPM    pantoprazole  40 mg Oral QAM AC    polyethylene glycol  17 g Oral Daily    predniSONE  5 mg Oral Daily    senna-docusate  1 tablet Oral BID    Or    senna-docusate  2 tablet Oral BID    tacrolimus  1.5 mg Oral " BID        Data   Recent Labs   Lab 09/29/23  0611 09/28/23  0655 09/27/23  0702 09/26/23  0619 09/24/23  0651 09/23/23  1651 09/23/23  1143   WBC  --   --  7.5 8.8 11.5*  --  12.9*   HGB  --   --  8.3* 8.5* 9.1*  --  10.0*   MCV  --   --  83 83 83  --  80   PLT  --   --  234 246 235  --  282   * 136 136 137 133*  --  131*   POTASSIUM 4.7 4.4 4.4 4.7 4.9  --  4.3   CHLORIDE 103 105 107 107 99  --  97*   CO2 20* 21* 19* 19* 22  --  23   BUN 31.0* 34.5* 33.3* 45.5* 56.9*  --  60.1*   CR 2.09* 2.22* 1.81* 2.18* 2.64*  --  2.71*   ANIONGAP 11 10 10 11 12  --  11   KAYKAY 8.2* 8.6* 8.3* 8.5* 8.7*  --  9.3   GLC 95 83 93 91 88  --  126*   ALBUMIN 2.5* 2.6* 2.4*  --   --   --  3.2*   PROTTOTAL  --   --  5.2*  --   --   --  6.3*   BILITOTAL  --   --  0.3  --   --   --  0.4   ALKPHOS  --   --  106*  --   --   --  128*   ALT  --   --  8  --   --   --  10   AST  --   --  14  --   --   --  14   LIPASE  --   --   --   --   --  19 19         No results found for this or any previous visit (from the past 24 hour(s)).

## 2023-09-29 NOTE — PROVIDER NOTIFICATION
MD Notification    Notified Person: MD    Notified Person Name:Tyrese    Notification Date/Time:9/29/23@0146    Notification Interaction:[employer] web    Purpose of Notification:Y.YUNI--Pt's B.P consistently elevated shaheed the diastolic, any new order    Orders Received:nifedipine one time dose    Comments:

## 2023-09-29 NOTE — TELEPHONE ENCOUNTER
----- Message from Larry Thornton MD sent at 9/28/2023  5:49 PM CDT -----  Please schedule this patient for outpatient follow-up with Dr. Barker in 1 month to discuss celiac and SMA stenosis.  No additional imaging is needed.  Thank you.    Routing to  to coordinate in clinic OV follow up with Dr. Barker 1 month from 9/28/23.     Appt note: 1 month follow up. Discuss celiac and SMA stenosis.     BALDO Valente, RN  [facility]  Office:  [phone] Fax: [phone]

## 2023-09-29 NOTE — PROGRESS NOTES
PRIMARY Concern: abd pain   Tests/Procedures for next shift: none   Safety Risk CONCERNS: fall risk, pt refused bed alarm, education provided.       Where is patient from? (Home, TCU, etc.): home w/ spouse   Other Important info for next shift: vascular surgery signed off, Nephrology following   Anticipated DC date & active delays: pending   SUMMARY NOTE:  Orientation/Cognitive: A/Ox4   Observation Goals (Met/ Not Met): inpt   Mobility Level/Assist Equipment: SBA w/walker   Pain Management: Denies  Tele/VS/O2: Soft BP, on 3L O2 NC   ABNL Lab/BG: Na 134, creat 2.09,    Diet: low fiber   Bowel/Bladder: up to commode   Skin Concerns: Blanchable redness to coccyx, +2 swelling to BLE, compression stockings on   Drains/Devices: PIV SL   Patient Stated Goal for Today: Rest.

## 2023-09-29 NOTE — PLAN OF CARE
PRIMARY Concern: abd pain   Tests/Procedures for next shift: none   Safety Risk CONCERNS: fall risk, pt refused bed alarm, education provided.                   (fall risk, behaviors, etc.)  Where is patient from? (Home, TCU, etc.): home w/ spouse   Other Important info for next shift: vascular surgery signed off, Nephrology following   Anticipated DC date & active delays: pending   SUMMARY NOTE:  Orientation/Cognitive: A/Ox4   Observation Goals (Met/ Not Met): inpt   Mobility Level/Assist Equipment: SBA w/walker   Pain Management: abd pain   Tele/VS/O2: VSS on 3L O2 NC   ABNL Lab/BG: Phos 2.7  Diet: low fiber   Bowel/Bladder: up to commode   Skin Concerns: Blanchable redness to coccyx, +2 swelling to BLE, compression stockings on   Drains/Devices: IV SL   Patient Stated Goal for Today: pain improvement

## 2023-09-29 NOTE — PLAN OF CARE
Goal Outcome Evaluation:         Shift: [id number] 09/28/23    PRIMARY Concern: abdominal pain  SAFETY RISK Concerns (fall risk, behaviors, etc.): fall      Tests/Procedures for NEXT shift:  None  Consults? (Pending/following, signed-off?) GI signed off. Nephro & Vascular following.  Where is patient from? (Home, TCU, etc.): home  Other Important info for NEXT shift: BP elevated. Denied metoprolol. Hydralazine given.   Anticipated DC date & active delays: TBD     SUMMARY NOTE:            Orientation/Cognitive: AOX4  Observation Goals (Met/ Not Met): Inpt  Mobility Level/Assist Equipment: A1 gb/w  Antibiotics & Plan (IV/po, length of tx left):  NA  Pain Management: denies. PRN oxy and tylenol available.   Tele/VS/O2: VSS on 3L NC ex BP elevated.  ABNL Lab/BG:   Diet: low fiber diet  Bowel/Bladder: continent. Up to BSC.  Skin Concerns: Blanchable redness to sacrum/ coccyx area. Mepilex in place    Drains/Devices: PIV SL

## 2023-09-29 NOTE — PROVIDER NOTIFICATION
MD Notification    Notified Person: MD    Notified Person Name:Dr Morales    Notification Date/Time: 1016 9/29/23    Notification Interaction:vocera    Purpose of Notification: Pt is concerned about her swollen ankles and feet. Also, has increased SOB this morning. She is asking about torsemide to help with the fluids. Order for compression stockings?    Orders Received:    Comments:

## 2023-09-29 NOTE — PROVIDER NOTIFICATION
MD Notification    Notified Person: MD    Notified Person Name:Dr Morales     Notification Date/Time:1514 9/29/23    Notification Interaction:vocera     Purpose of Notification:    FYI, BP this afternoon is 98/59 and recheck is 93/60. thanks       Orders Received:Per Dr Morales, continue to monitor the BP     Comments:

## 2023-09-29 NOTE — SIGNIFICANT EVENT
Significant Event Note    Time of event: 1:54 AM September 29, 2023    Description of event:  DBP over 110    Plan:  Nif 30mg stat dose  Would recommend considering changing metop tartrate to labetalol vs coreg for additional BP control    Ambrosio Xiong MD

## 2023-09-30 NOTE — PLAN OF CARE
Goal Outcome Evaluation:    Date & Time: 9/30/23 2912-9522  Orientation/Cognitive: A&Ox4  Mobility Level/Assist Equipment: SBA/IND  Fall Risk (Y/N): NO  Behavior Concerns: Green  Pain Management: Reported abdominal pain earlier in shift but declined PRN oxycodone when offered  Tele/VS/O2: VSS except HTNsive, on 2 L oxygen via NC  ABNL Lab/BG: BUN-36.9, Cr-2.34, Albumin-2.8  Diet: Low fiber  Bowel/Bladder: Continent.  Reports loose stools x2-3 (Hospitalist aware)  Skin Concerns: None  Drains/Devices: Left PIV-SL  Tests/Procedures for next shift: None  Anticipated DC date & active delays: TBD

## 2023-09-30 NOTE — PROGRESS NOTES
Observation goals  PRIOR TO DISCHARGE        Comments:   -diagnostic tests and consults completed and resulted-Not Met  -vital signs normal or at patient baseline-Not Met  -tolerating oral intake to maintain hydration-Met  -adequate pain control on oral analgesics-Met  Nurse to notify provider when observation goals have been met and patient is ready for discharge.

## 2023-09-30 NOTE — PROGRESS NOTES
"Date & Time: 9/29/23 1900-0730  Orientation/Cognitive: A&Ox4  Mobility Level/Assist Equipment: SBA/IND  Fall Risk (Y/N): NO  Behavior Concerns: Green  Pain Management: Schedule Tylenol and PRN oxycodone 5 mg given for abdomen pain.  Tele/VS/O2: VSS on 2 L oxygen via NC  ABNL Lab/BG: , CR 2.09, urea 31.0, Shankar 8.2 Albumin 2.5, hgb 8.3, CRP 72.65  Diet: Low fiber  Bowel/Bladder: Contintnet  Skin Concerns: None  Drains/Devices: Left PIV  Tests/Procedures for next shift: None  Anticipated DC date & active delays: TBD    Bedtime schedule hydralazine and metoprolol was not given due to patient having low BP on previous shift.    /78 (BP Location: Right arm, Patient Position: Supine, Cuff Size: Adult Small)   Pulse 76   Temp 98.1  F (36.7  C) (Oral)   Resp 18   Ht 1.549 m (5' 1\")   Wt 42.2 kg (93 lb 0.6 oz)   SpO2 98%   BMI 17.58 kg/m        "

## 2023-09-30 NOTE — PROGRESS NOTES
Renal Medicine Progress Note                                Maribel Yang MRN# [id number]   Age: 72 year old YOB: 1950   Date of Admission: 9/23/2023 Hospital LOS: 5                  Assessment/Plan:     MAY, nonoliguric    Recent COVID infection with poor PO intake. Prerenal AMY, improved with IVF. CT abdomen with normal-appearing transplanted kidney. Slight bump in Cr since IVF discontinued 9/27.      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  Baseline creatinine 1.6-1.9, however after recent admission discharged with a creatinine of 2.1.  During recent admission notably had elevated Tac level, dose was decreased to 1.5 mg twice daily.  Patient reports she was taking 2 mg twice daily at home, unaware of this change. Tac level on 9/24 elevated at 13, however was not a true trough. Two doses held before resuming 1.5mg BID.   - tac trough 10.3  - continue PTA prednisone 5mg daily   - continue tacrolimus 1.5mg BID      Abdominal pain   CT without acute pathology. She is constipated. Workup per primary team, vasc surgery, and GI. If IV contrast study would be helpful to evaluate for certain etiologies including mesenteric ischemia then now would be ideal time to get study done with improved AMY and euvolemic state. Cosyntropin stim test performed.      CAD  Chronic diastolic CHF  HTN  Continue PTA Imdur hydralazine metoprolol. Diastolic pressures slightly high, med dose increased per primary team.      Small pericardial effusion without tamponade  Noted on TTE from previous admission.  CT this admission with tiny pericardial effusion.  No evidence of tamponade at this time.     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       Continued improvement in renal function  Tac level OK  Repeat several days    No diuretic today  Creatinine up slightly    Am labs  Pending labs could discharge       Interval History:     Patient awake and alert  Appears comfortable  Follows    Concerned regarding developing edema   UO increased per patient  Swelling same       ROS:     GENERAL: NAD, No fever,chills  R: NEGATIVE for significant cough or SOB  CV: NEGATIVE for chest pain, palpitations  MUSCULOSKELETAL: edema  ROS otherwise negative    Medications and Allergies:     Reviewed    Physical Exam:     Vitals were reviewed  Patient Vitals for the past 12 hrs:   BP Temp Temp src Pulse Resp SpO2 Weight   09/30/23 0754 (!) 166/110 97.2  F (36.2  C) Oral 90 18 100 % --   09/30/23 0654 -- -- -- -- -- -- 42.6 kg (94 lb)   09/30/23 0400 130/78 98.1  F (36.7  C) Oral 76 18 98 % --     Wt Readings from Last 4 Encounters:   09/30/23 42.6 kg (94 lb)   09/10/23 34.5 kg (76 lb)   08/30/23 37.6 kg (83 lb)   07/19/23 37.2 kg (82 lb)     I/O last 3 completed shifts:  In: 600 [P.O.:600]  Out: -     Vitals:    09/23/23 1545 09/24/23 0331 09/25/23 0711 09/28/23 0650   Weight: 34 kg (75 lb) 37.3 kg (82 lb 4.8 oz) 39.5 kg (87 lb) 42.2 kg (93 lb 0.6 oz)    09/30/23 0654   Weight: 42.6 kg (94 lb)         GENERAL: awake, alert, follows  HEENT: NC/AT, PERRLA, EOMI, non icteric, pharynx moist without lesion  RESP:  clear anteriorly  CV: RRR, normal S1 S2  ABDOMEN: soft, nontender, no HSM or masses and bowel sounds normal  MS: no clubbing, cyanosis   SKIN: clear without significant rashes or lesions  NEURO: speech normal and cranial nerves 2-12 intact  PSYCH: affect normal/bright  EXT: 1 + bilateral edema feet/lower leg    Data:     Recent Labs   Lab 09/30/23  0628 09/29/23  0611 09/28/23  0655 09/27/23  0702    134* 136 136   POTASSIUM 4.2 4.7 4.4 4.4   CHLORIDE 103 103 105 107   CO2 20* 20* 21*  19*   ANIONGAP 14 11 10 10   * 95 83 93   BUN 36.9* 31.0* 34.5* 33.3*   CR 2.34* 2.09* 2.22* 1.81*   GFRESTIMATED 22* 25* 23* 30*   KAYKAY 8.9 8.2* 8.6* 8.3*         Recent Labs   Lab 09/30/23  0628      POTASSIUM 4.2   CHLORIDE 103   CO2 20*   ANIONGAP 14   *   BUN 36.9*   CR 2.34*   GFRESTIMATED 22*   KAYKAY 8.9     Recent Labs   Lab 09/30/23  0628 09/29/23  0611 09/28/23  0655 09/27/23  0702 09/26/23  0619 09/24/23  0651   CR 2.34* 2.09* 2.22* 1.81* 2.18* 2.64*         G Clarke Javed MD    [facility] - Nephrology  [phone]

## 2023-09-30 NOTE — PROGRESS NOTES
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     Mid abdominal pain-nocturnal  Peripheral vascular disease  Aneurysmal dilation of the ascending thoracic aorta measuring 6 cm  AAA, S/p endovascular aneurysm repair and femorofemoral bypass, 4/2021  S/p right femoral to profunda femoris artery bypass and repair of right femoral pseudoaneurysm, 6/2023  Within the ED, mild leukocytosis,elevated CRP of 62 (up from 22 two weeks ago), Lactate normal at 1.  Epigastric tenderness on exam.  LFTs at baseline.  Lipase normal. CT scan did not show any cause to explain the pain.  *Last EGD was in February 2023. Largely unremarkable   *Last colonoscopy was in July 2022 that showed moderately congested mucosa in transverse colon, ascending colon and cecum, diffuse mild inflammation in ascending colon and cecum, diverticulosis of sigmoid colon.  Biopsies showed chronic inflammation and patchy fibrosis.  *during admission [facility] 8/30 had neg enteric panel, positive C.diff but antigen test negative suggestive of colonization. PCP had ordered repeat Cdiff at outpatient visit 9/23, stool not collected    - appreciate follow up from vascular surgery on results of doppler. Patient satisfied with the answers she received  - Plan to continue to monitor her symptoms with discharge possibly tomorrow if cleared by nephrology. 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  Recent tacrolimus level was elevated at 8.3 on 9/7.   *Follows up with transplant nephrology  *Baseline creatinine up to 1.9.     - nephrology consulted and following  - given torsemide yesterday however cr up to 2.3 today  - holding diuretics and lisinopril again today, follow labs tomorrow and possible discharge  - continue PTA prednisone and tac     Hypomagnesemia, magnesium 1.4  - replacement protocol     Coronary artery disease, s/p inferior STEMI, s/p RCA stenting, 4/2021  Chronic elevation of troponin  Chronic diastolic CHF with recovered EF  Essential hypertension  -On DAPT, Imdur, hydralazine, metoprolol, statin  -Troponin elevated at 40, 39. Trend is flat and at baseline  - remains on home meds increased hydralazine     Recent COVID-19 infection with bilateral pneumonitis with acute hypoxic respiratory failure  -Tested positive on 8/30.  Hospitalized at [facility] from 9/5-9/10.  Treated with IV remdesivir and dexamethasone.  - currently continues with oxygen use for comfort, sats 92% on room air   - check true oxygen levels prior to discharge. Order placed today     Generalized weakness  -During recent stay at [facility], PT OT expressed concerns about patient returning home and her ability to complete ADLs.  Patient declined home PT. reports she was able to do ADLs until day prior to admission   -home health recommended     Small pericardial effusion without tamponade  -Limited echo 9/5 showed EF 50-55% normal RV function, mild-moderate pericardial effusion of 1.6 cm without evidence of tamponade.  -CT scan on admission mentioned tiny pericardial effusion  -outpatient follow up     Small cell carcinoma of right lung, s/p SBRT, 2014  Squamous cell carcinoma of anus, s/p full-thickness excision and chemoradiation, 2018  -Stable     Anemia of chronic renal  "disease  -Hemoglobin stable ~10.     COPD  -Continue Combivent inhaler 4 times daily     Diet: Snacks/Supplements Adult: Ensure Enlive; With Meals  Advance Diet as Tolerated: Full Liquid Diet    DVT Prophylaxis: Pneumatic Compression Devices, already on DAPT   Villavicencio Catheter: Not present  Lines: None     Cardiac Monitoring: None  Code Status: Full Code          Clinically Significant Risk Factors Present on Admission             # Hypomagnesemia: Lowest Mg = 1.4 mg/dL in last 2 days, will replace as needed   # Hypoalbuminemia: Lowest albumin = 3.2 g/dL at 9/23/2023 11:43 AM, will monitor as appropriate   # Drug Induced Platelet Defect: home medication list includes an antiplatelet medication  # Acute Kidney Injury, unspecified: based on a >150% or 0.3 mg/dL increase in last creatinine compared to past 90 day average, will monitor renal function  # Hypertension: Noted on problem list  # Chronic heart failure with preserved ejection fraction: heart failure noted on problem list and last echo with EF >50%     # Cachexia: Estimated body mass index is 16.44 kg/m  as calculated from the following:    Height as of this encounter: 1.549 m (5' 1\").    Weight as of this encounter: 39.5 kg (87 lb).       # COPD: noted on problem list      Disposition Plan     Expected Discharge Date: 09/27/2023        Discharge Comments: PT recommends TCU vs home wit assist and home PT  pt denies TCU would like to go home. Per PT pt should have 24/7 home assist and PT to improve mobility/strengthening  [facility] accepted referal       Medical Decision Making       45 MINUTES SPENT BY ME on the date of service doing chart review, history, exam, documentation & further activities per the note.       Interval History   Spoke with patient at length about possible discharge. She is ready to go from a pain standpoint if nephrology agreeable. She thinks he pain can be managed at home and ok with pursing further outpatient work up. Pain " "again epigastric and knawing, relieved with norco. No further diarrhea. Legs remains slightly swollen today    -Data reviewed today: I reviewed all new labs and imaging over the last 24 hours. I personally reviewed.    Physical Exam    , Blood pressure (!) 166/110, pulse 90, temperature 97.2  F (36.2  C), temperature source Oral, resp. rate 18, height 1.549 m (5' 1\"), weight 42.6 kg (94 lb), SpO2 100 %, not currently breastfeeding.  Vitals:    09/25/23 0711 09/28/23 0650 09/30/23 0654   Weight: 39.5 kg (87 lb) 42.2 kg (93 lb 0.6 oz) 42.6 kg (94 lb)     Vital Signs with Ranges  Temp:  [97.2  F (36.2  C)-98.1  F (36.7  C)] 97.2  F (36.2  C)  Pulse:  [76-91] 90  Resp:  [16-18] 18  BP: ()/() 166/110  SpO2:  [94 %-100 %] 100 %  I/O's Last 24 hours  I/O last 3 completed shifts:  In: 600 [P.O.:600]  Out: -     Constitutional: Awake, alert, cooperative, no apparent distress  Respiratory: Clear to auscultation bilaterally, no crackles or wheezing  Cardiovascular: Regular rate and rhythm, normal S1 and S2, and no murmur noted, +1 edema in LE,  GI: Normal bowel sounds, soft, non-distended, no TTP on my assessment  Skin/Integumen: No rashes, no cyanosis,  Other:      Medications   All medications were reviewed.       acetaminophen  1,000 mg Oral At Bedtime    aspirin  81 mg Oral Daily    atorvastatin  80 mg Oral Daily    clopidogrel  75 mg Oral Daily    hydrALAZINE  50 mg Oral TID    ipratropium-albuterol  1 puff Inhalation 4x Daily    isosorbide mononitrate  60 mg Oral Daily    metoprolol tartrate  100 mg Oral Daily    metoprolol tartrate  50 mg Oral QPM    pantoprazole  40 mg Oral QAM AC    polyethylene glycol  17 g Oral Daily    predniSONE  5 mg Oral Daily    senna-docusate  1 tablet Oral BID    Or    senna-docusate  2 tablet Oral BID    tacrolimus  1.5 mg Oral BID        Data   Recent Labs   Lab 09/30/23  0628 09/29/23  0611 09/28/23  0655 09/27/23  0702 09/26/23  0619 09/26/23  0619 09/24/23  0651 " 09/24/23  0651 09/23/23  1651   WBC  --   --   --  7.5  --  8.8  --  11.5*  --    HGB  --   --   --  8.3*  --  8.5*  --  9.1*  --    MCV  --   --   --  83  --  83  --  83  --    PLT  --   --   --  234  --  246  --  235  --     134* 136 136  --  137   < > 133*  --    POTASSIUM 4.2 4.7 4.4 4.4  --  4.7   < > 4.9  --    CHLORIDE 103 103 105 107  --  107   < > 99  --    CO2 20* 20* 21* 19*  --  19*   < > 22  --    BUN 36.9* 31.0* 34.5* 33.3*  --  45.5*   < > 56.9*  --    CR 2.34* 2.09* 2.22* 1.81*  --  2.18*   < > 2.64*  --    ANIONGAP 14 11 10 10  --  11   < > 12  --    KAYKAY 8.9 8.2* 8.6* 8.3*  --  8.5*   < > 8.7*  --    * 95 83 93  --  91   < > 88  --    ALBUMIN 2.8* 2.5* 2.6* 2.4*   < >  --   --   --   --    PROTTOTAL  --   --   --  5.2*  --   --   --   --   --    BILITOTAL  --   --   --  0.3  --   --   --   --   --    ALKPHOS  --   --   --  106*  --   --   --   --   --    ALT  --   --   --  8  --   --   --   --   --    AST  --   --   --  14  --   --   --   --   --    LIPASE  --   --   --   --   --   --   --   --  19    < > = values in this interval not displayed.         No results found for this or any previous visit (from the past 24 hour(s)).

## 2023-09-30 NOTE — PROGRESS NOTES
"PRIMARY DIAGNOSIS: \"GENERIC\" NURSING  OUTPATIENT/OBSERVATION GOALS TO BE MET BEFORE DISCHARGE:  ADLs back to baseline: No    Activity and level of assistance: Up with standby assistance.    Pain status: Improved-controlled with oral pain medications.    Return to near baseline physical activity: No     Discharge Planner Nurse   Safe discharge environment identified: No  Barriers to discharge: Yes       Entered by: Rakesh Menjivar RN 09/29/2023 9:13 PM     Please review provider order for any additional goals.   Nurse to notify provider when observation goals have been met and patient is ready for discharge.  "

## 2023-09-30 NOTE — PROGRESS NOTES
PRIMARY DIAGNOSIS: ACUTE PAIN  OUTPATIENT/OBSERVATION GOALS TO BE MET BEFORE DISCHARGE:  1. Pain Status: Improved-controlled with oral pain medications.    2. Return to near baseline physical activity: No    3. Cleared for discharge by consultants (if involved): No    Discharge Planner Nurse   Safe discharge environment identified: No  Barriers to discharge: Yes       Entered by: Rakesh Menjivar RN 09/30/2023 12:16 AM     Please review provider order for any additional goals.   Nurse to notify provider when observation goals have been met and patient is ready for discharge.

## 2023-10-01 NOTE — PROGRESS NOTES
Renal Medicine Progress Note                                Maribel Yang MRN# [id number]   Age: 72 year old YOB: 1950   Date of Admission: 9/23/2023 Hospital LOS: 6                  Assessment/Plan:     AMY, nonoliguric    Recent COVID infection with poor PO intake. Prerenal AMY, improved with IVF. CT abdomen with normal-appearing transplanted kidney. Slight bump in Cr since IVF discontinued 9/27.      ESRD due to [place] syndrome  S/p LDKT 2004  Follows with [facility] transplant nephrology.  Baseline creatinine 1.6-1.9, however after recent admission discharged with a creatinine of 2.1.  During recent admission notably had elevated Tac level, dose was decreased to 1.5 mg twice daily.  Patient reports she was taking 2 mg twice daily at home, unaware of this change. Tac level on 9/24 elevated at 13, however was not a true trough. Two doses held before resuming 1.5mg BID.   - tac trough 10.3  - continue PTA prednisone 5mg daily   - continue tacrolimus 1.5mg BID      Abdominal pain   CT without acute pathology. She is constipated. Workup per primary team, vasc surgery, and GI. If IV contrast study would be helpful to evaluate for certain etiologies including mesenteric ischemia then now would be ideal time to get study done with improved AMY and euvolemic state. Cosyntropin stim test performed.      CAD  Chronic diastolic CHF  HTN  Continue PTA Imdur hydralazine metoprolol. Diastolic pressures slightly high, med dose increased per primary team.      Small pericardial effusion without tamponade  Noted on TTE from previous admission.  CT this admission with tiny pericardial effusion.  No evidence of tamponade at this time.     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       Creatinine down slightly  No diuretic given yesterday    Remains on 02    Could discharge from renal standpoint  Continue to hold diuretic    Follow up 2 weeks [facility] transplant  Address 02 issue       Interval History:     Patient awake and alert  Appears comfortable  Follows    Swelling same       ROS:     GENERAL: NAD, No fever,chills  R: NEGATIVE for significant cough or SOB  CV: NEGATIVE for chest pain, palpitations  MUSCULOSKELETAL: edema  ROS otherwise negative    Medications and Allergies:     Reviewed    Physical Exam:     Vitals were reviewed  Patient Vitals for the past 12 hrs:   BP Temp Temp src Pulse Resp SpO2   10/01/23 0930 -- -- -- -- 18 --   10/01/23 0847 -- -- -- -- 16 --   10/01/23 0759 (!) 167/109 98.8  F (37.1  C) Oral 79 16 93 %   10/01/23 0339 (!) 157/103 97.7  F (36.5  C) Oral 76 16 96 %   10/01/23 0005 (!) 153/91 98.4  F (36.9  C) Oral -- 16 95 %     Wt Readings from Last 4 Encounters:   09/30/23 42.6 kg (94 lb)   09/10/23 34.5 kg (76 lb)   08/30/23 37.6 kg (83 lb)   07/19/23 37.2 kg (82 lb)     I/O last 3 completed shifts:  In: 440 [P.O.:440]  Out: -     Vitals:    09/23/23 1545 09/24/23 0331 09/25/23 0711 09/28/23 0650   Weight: 34 kg (75 lb) 37.3 kg (82 lb 4.8 oz) 39.5 kg (87 lb) 42.2 kg (93 lb 0.6 oz)    09/30/23 0654   Weight: 42.6 kg (94 lb)         GENERAL: awake, alert, follows  HEENT: NC/AT, PERRLA, EOMI, non icteric, pharynx moist without lesion  RESP:  clear anteriorly  CV: RRR, normal S1 S2  ABDOMEN: soft, nontender, no HSM or masses and bowel sounds normal  MS: no clubbing, cyanosis   SKIN: clear without significant rashes or lesions  NEURO: speech normal and cranial nerves 2-12 intact  PSYCH: affect normal/bright  EXT: 1 + bilateral edema feet/lower leg    Data:     Recent Labs   Lab 10/01/23  0744 09/30/23  0628 09/29/23  0611 09/28/23  0655    137 134* 136   POTASSIUM 4.0 4.2 4.7 4.4    CHLORIDE 103 103 103 105   CO2 21* 20* 20* 21*   ANIONGAP 14 14 11 10   GLC 88 105* 95 83   BUN 34.4* 36.9* 31.0* 34.5*   CR 2.25* 2.34* 2.09* 2.22*   GFRESTIMATED 23* 22* 25* 23*   KAYKAY 8.9 8.9 8.2* 8.6*         Recent Labs   Lab 10/01/23  0744      POTASSIUM 4.0   CHLORIDE 103   CO2 21*   ANIONGAP 14   GLC 88   BUN 34.4*   CR 2.25*   GFRESTIMATED 23*   KAYKAY 8.9     Recent Labs   Lab 10/01/23  0744 09/30/23  0628 09/29/23  0611 09/28/23  0655 09/27/23  0702 09/26/23  0619   CR 2.25* 2.34* 2.09* 2.22* 1.81* 2.18*         G Clarke Javed MD    [facility] - Nephrology  [phone]

## 2023-10-01 NOTE — PROGRESS NOTES
Patient has been assessed for Home Oxygen needs. Oxygen readings:    *Pulse oximetry (SpO2) = 91% on room air at rest while awake.    *SpO2 improved to 96% on 2liters/minute at rest.    *SpO2 = 83% on room air during activity/with exercise.    *SpO2 improved to 94% on 2liters/minute during activity/with exercise.

## 2023-10-01 NOTE — PROGRESS NOTES
PRIMARY Concern: abd pain   Tests/Procedures for next shift: none   Safety Risk CONCERNS: fall risk, pt refused bed alarm, education provided.       Where is patient from? (Home, TCU, etc.): home w/ spouse   Other Important info for next shift: vascular surgery signed off, seen by Nephrology, ok to discharge.    Anticipated DC date & active delays: Today 10/2 w/ [facility]. Awaiting o2 delivery.   SUMMARY NOTE:  Orientation/Cognitive: A/Ox4   Observation Goals (Met/ Not Met): inpt   Mobility Level/Assist Equipment: SBA w/walker   Pain Management: PRN Tylenol  Tele/VS/O2: Elevated BP at times, will discharge on 2L O2 NC  ABNL Lab/BG: creat 2.25,   Diet: low fiber   Bowel/Bladder: up to commode   Skin Concerns: Blanchable redness to coccyx, +2 swelling to BLE, compression stockings on   Drains/Devices: PIV removed, ready for discharge    Patient Stated Goal for Today: Go home.   Pt's daughter will transport home.

## 2023-10-01 NOTE — PLAN OF CARE
Physical Therapy Discharge Summary    Reason for therapy discharge:    Discharged to home with home therapy.    Progress towards therapy goal(s). See goals on Care Plan in [facility] electronic health record for goal details.  Goals partially met.  Barriers to achieving goals:   discharge from facility.    Therapy recommendation(s):    Continued therapy is recommended.  Rationale/Recommendations:  pt would benefit from continued therapy w/ HCPT.

## 2023-10-01 NOTE — PROGRESS NOTES
Care Management Discharge Note    Discharge Date: 10/01/2023       Discharge Disposition:      Discharge Services:      Discharge DME:      Discharge Transportation: family or friend will provide    Private pay costs discussed: Not applicable    Does the patient's insurance plan have a 3 day qualifying hospital stay waiver?  Yes     Which insurance plan 3 day waiver is available? Alternative insurance waiver    Will the waiver be used for post-acute placement? No    PAS Confirmation Code:    Patient/family educated on Medicare website which has current facility and service quality ratings:      Education Provided on the Discharge Plan:    Persons Notified of Discharge Plans: Bedside RN  Patient/Family in Agreement with the Plan:      Handoff Referral Completed: Yes    Additional Information:  Pt is discharging home with HHC PT. [facility] accepted Pt. Pt discharging with home O2.         Dalton Saeed, RN, BSN, Care Coordinator

## 2023-10-01 NOTE — PROGRESS NOTES
I certify that this patient, Maribel Yang has been under my care (or a nurse practitioner or physican's assistant working with me). This is the face-to-face encounter for oxygen medical necessity.      At the time of this encounter supplemental oxygen is reasonable and necessary and is expected to improve the patient's condition in a home setting.       Patient has continued oxygen desaturation due to Chronic Respiratory Failure with Hypoxia J96.11  COPD J44.9  COVID-19 U07.1.    If portability is ordered, is the patient mobile within the home? yes

## 2023-10-01 NOTE — DISCHARGE SUMMARY
"[facility]  Hospitalist Discharge Summary      Date of Admission:  9/23/2023  Date of Discharge:  10/1/2023  Discharging Provider: Catalina Morales DO  Discharge Service: Hospitalist Service    Discharge Diagnoses   See below    Clinically Significant Risk Factors     # Cachexia: Estimated body mass index is 17.76 kg/m  as calculated from the following:    Height as of this encounter: 1.549 m (5' 1\").    Weight as of this encounter: 42.6 kg (94 lb).  # Severe Malnutrition: based on nutrition assessment      Follow-ups Needed After Discharge   Follow-up Appointments     Follow Up ([facility])      Follow-up with in vascular surgery clinic in one month after repeat upper   and lower endoscopy is performed by the gastroenterologist.    The vascular surgery clinic coordinator will call you within 3 business   days after leaving the hospital to schedule your appointment.      With general vascular surgery questions, concerns, or to request/change an   appointment, please call the [facility] Vascular Surgery Clinic:    [address]  [place] [address]    Phone: [phone]        Follow-up and recommended labs and tests       Follow up with primary care provider, Lisa Martinez, within 7 days for   hospital follow- up. Follow up with nephrology in 2 weeks with labs.  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 During " hospitalization GI consulted and did not recommend aggressive work up (egd and colonoscopy in the last 1 year with normal CT scan on admission) and to treat constipation. Vascular surgery performed mesenteric doppler that did return with stenosis however they were not convinced patient symptoms related to ischemia especially in light of elevated CRP and other reasons for her pain. Over the course of her hospitalization she felt better with only mild abdominal pain during the night. She will have close follow up with vascular surgery in clinic and GI. No gallbladder work up performed this hospitalization. Another etiology for her pain could be long Covid as the pain developed shortly after her covid illness. While here patient noted to have AMY on CKD that nephrology followed closely. She will have repeat labs and follow up with nephrology in clinic in 2 weeks.     Mid abdominal pain-nocturnal  Peripheral vascular disease  Aneurysmal dilation of the ascending thoracic aorta measuring 6 cm  AAA, S/p endovascular aneurysm repair and femorofemoral bypass, 4/2021  S/p right femoral to profunda femoris artery bypass and repair of right femoral pseudoaneurysm, 6/2023  Within the ED, mild leukocytosis,elevated CRP of 62 (up from 22 two weeks ago), Lactate normal at 1.  Epigastric tenderness on exam.  LFTs at baseline.  Lipase normal. CT scan did not show any cause to explain the pain.  *Last EGD was in February 2023. Largely unremarkable   *Last colonoscopy was in July 2022 that showed moderately congested mucosa in transverse colon, ascending colon and cecum, diffuse mild inflammation in ascending colon and cecum, diverticulosis of sigmoid colon.  Biopsies showed chronic inflammation and patchy fibrosis.  *during admission [facility] 8/30 had neg enteric panel, positive C.diff but antigen test negative suggestive of colonization.  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  Recent tacrolimus level was elevated at 8.3 on 9/7.   *Follows up with transplant nephrology  *Baseline creatinine up to 1.9.      - given torsemide yesterday however cr up to 2.3 today  - holding diuretics on discharge continue PTA prednisone and tac at discharge  - follow up in 2 weeks with labs     Hypomagnesemia, magnesium 1.4  - replacement protocol     Coronary artery disease, s/p inferior STEMI, s/p RCA stenting, 4/2021  Chronic elevation of troponin  Chronic diastolic CHF with recovered EF  Essential hypertension  On DAPT, Imdur, hydralazine, metoprolol, statin  -Troponin elevated at 40, 39. Trend is flat and at baseline  - lisinopril remains held since her last hospitalization. ON increased dose of hydralazine at 100mg TID for now     Recent COVID-19 infection with bilateral pneumonitis   acute hypoxic respiratory failure  -Tested positive on 8/30.  Hospitalized at [facility] from 9/5-9/10.  Treated with IV remdesivir and dexamethasone and discharged home.  - new oxygen needs on discharge at 2L, suspect multifactorial related to heart failure, worsening CKD and recent COVID with COPD. Send on home oxygen and reassess in clinic     Generalized weakness  -During recent stay at [facility], PT OT expressed concerns about patient returning home and her ability to complete ADLs.  Patient declined home PT. reports she was able to do ADLs until day prior to admission   -home health recommended     Small pericardial effusion without tamponade  -Limited echo 9/5 showed EF 50-55% normal RV function, mild-moderate pericardial effusion of 1.6 cm without evidence of tamponade.  -CT scan on admission mentioned tiny pericardial effusion  -outpatient follow up     Small cell carcinoma of right lung, s/p SBRT,  2014  Squamous cell carcinoma of anus, s/p full-thickness excision and chemoradiation, 2018  -Stable     Anemia of chronic renal disease  -Hemoglobin stable ~10.     COPD  -Continue Combivent inhaler 4 times daily    Consultations This Hospital Stay   PHYSICAL THERAPY ADULT IP CONSULT  CARE MANAGEMENT / SOCIAL WORK IP CONSULT  GASTROENTEROLOGY IP CONSULT  NEPHROLOGY IP CONSULT  INFECTION PREVENTION IP CONSULT  VASCULAR SURGERY IP CONSULT    Code Status   Full Code    Time Spent on this Encounter   ICatalina DO, personally saw the patient today and spent greater than 30 minutes discharging this patient.       Catalina Morales DO  [facility] EXTENDED RECOVERY AND SHORT STAY  [address] [address]  Phone: [phone]  ______________________________________________________________________    Physical Exam   Vital Signs: Temp: 98  F (36.7  C) Temp src: Oral BP: 124/86 Pulse: 75   Resp: 18 SpO2: 97 % O2 Device: Nasal cannula Oxygen Delivery: 2 LPM  Weight: 94 lbs 0 oz       Primary Care Physician   Lisa Martinez    Discharge Orders      Medication Therapy Management Referral      Home Care Referral      Adult GI  Referral - Consult Only      Follow Up ([facility])    Follow-up with in vascular surgery clinic in one month after repeat upper and lower endoscopy is performed by the gastroenterologist.    The vascular surgery clinic coordinator will call you within 3 business days after leaving the hospital to schedule your appointment.      With general vascular surgery questions, concerns, or to request/change an appointment, please call the [facility] Vascular Surgery Clinic:    [address]  [place] [address]    Phone: [phone]     Reason for your hospital stay    You presented for abdominal pain after your recent recovery from COVID 19. No clear etiology for your pain was established however it improved enough to go home with  further outpatient work up.     Follow-up and recommended labs and tests     Follow up with primary care provider, Lisa Martinez, within 7 days for hospital follow- up. Follow up with nephrology in 2 weeks with labs.  You should also follow up with vascular surgery and GI in the next few weeks     Activity    Your activity upon discharge: activity as tolerated     Oxygen Adult/Peds     Diet    Follow this diet upon discharge: Orders Placed This Encounter      Snacks/Supplements Adult: Ensure Enlive; With Meals      Low Fiber Diet       Significant Results and Procedures   Results for orders placed or performed during the hospital encounter of 09/23/23   CT Abdomen Pelvis w/o Contrast    Narrative    EXAM: CT ABDOMEN PELVIS W/O CONTRAST  LOCATION: [facility]  DATE: 9/23/2023    INDICATION: Severe epigastric pain.  COMPARISON: CT 06/06/2023.  TECHNIQUE: CT scan of the abdomen and pelvis was performed without IV contrast. Multiplanar reformats were obtained. Dose reduction techniques were used.  CONTRAST: None.    FINDINGS:   LOWER CHEST: Prominent aneurysmal dilatation of the descending thoracic aorta measuring approximately 6.0 cm in greatest radial dimension. Minute bilateral pleural effusions. Small pericardial effusion. Mild to moderate atelectasis most marked in the   right lower lobe.    HEPATOBILIARY: Normal.    PANCREAS: Slight calcification which may represent sequelae of chronic pancreatitis. Pancreas is otherwise normal with no changes of acute pancreatitis.    SPLEEN: Single tiny calcification which may represent a calcified granuloma.    ADRENAL GLANDS: Normal.    KIDNEYS/BLADDER: Marked atrophy of both kidneys with no hydronephrosis. 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    BOWEL: Moderate findings of diverticulosis with no evidence for diverticulitis. The appendix is normal.    LYMPH NODES: Normal.    VASCULATURE: There is an endograft seen involving the infrarenal  abdominal aorta and extending into the left common iliac artery. There is aneurysmal dilatation seen of the abdominal aorta most marked in the upper abdominal aorta and this measures   approximately 5 cm in greatest AP dimension. No evidence for rupture. Prominence of the right groin soft tissues most typical for a postoperative seroma/pseudoaneurysm and this has decreased in size since 06/06/2023 which previously measured   approximately 4 cm in greatest radial dimension.    PELVIC ORGANS: Normal.    MUSCULOSKELETAL: Moderate degenerative changes most marked at the L5-S1 interspace.      Impression    IMPRESSION:   1.  No significant changes since 06/06/2023 with again seen significant areas of aneurysmal dilatation most pronounced in the descending thoracic aorta. Postoperative changes to include an endograft.    2.  Findings most typical for a partially resolved seroma/pseudoaneurysm in the right groin.    3.  Advanced atrophy of the native kidneys with transplant kidney seen in the left hemipelvis.    4.  Tiny pericardial effusion.    5.  Tiny bilateral pleural effusions with slight atelectasis.     XR Colon Water Soluble    Narrative    COLON WATER SOLUBLE  9/26/2023 3:11 PM     HISTORY: Constipation; abdominal pain.    COMPARISON: None.    TECHNIQUE: Water soluble contrast was introduced into the colon  through a rectal tube under gravity.      FLUOROSCOPY TIME: 3.1 minutes.    SPOT FILMS: 8    FINDINGS: Contrast flowed easily into the colon.  The colon is widely  patent from the rectum to the cecum. Small to moderate amount of  stool. There was no reflux of contrast into the terminal ileum. There  were no filling defects.      Impression    IMPRESSION: No obstruction demonstrated.    ALEX LESTER MD         SYSTEM ID:  [id number]   US Mesenteric Arteries Complete    Narrative    EXAM: Mesenteric artery duplex  LOCATION: [facility]  DATE/SUKHI: 9/27/2023 7:29 PM CDT    INDICATION: History of median arcuate  ligament syndrome, known abdominal aortic aneurysm, pain.      TECHNIQUE: Duplex imaging is performed utilizing gray-scale, two-dimensional images, and color-flow imaging. Doppler waveform analysis and spectral Doppler imaging is also performed.    FINDINGS:     PROXIMAL AORTA:  Peak systolic velocity: 45 cm/s  Diameter: 5.1 cm    CELIAC ARTERY:  Peak systolic velocity with normal respiration: 362 cm/s  Peak systolic velocity with full inspiration: 378 cm/s  Peak systolic velocity with full expiration: 355 cm/s  Celiac aortic ratio: 8.0    Hepatic Artery: Peak systolic velocity: 88 cm/s  Splenic Artery: Peak systolic velocity: 65 cm/s    SMA:  Peak systolic velocity at origin 279 cm/s  Peak systolic velocity in the distal SMA trunk: 135 cm/s  SMA/aortic ratio: 6.2      Impression    IMPRESSION:  1. Suprarenal aortic aneurysm that measures up to 5.1 cm, better visualized on the recent abdominal CT (09/23/2023)    2. Fixed, greater than than 70% celiac artery stenosis.    3. Elevated SMA velocities suggestive of greater than 70% stenosis.           *Note: Due to a large number of results and/or encounters for the requested time period, some results have not been displayed. A complete set of results can be found in Results Review.       Discharge Medications   Current Discharge Medication List        START taking these medications    Details   !! oxyCODONE (ROXICODONE) 5 MG tablet Take 1 tablet (5 mg) by mouth every 6 hours as needed for pain  Qty: 12 tablet, Refills: 0    Associated Diagnoses: Abdominal pain, unspecified abdominal location      pantoprazole (PROTONIX) 40 MG EC tablet Take 1 tablet (40 mg) by mouth every morning (before breakfast) for 30 days  Qty: 30 tablet, Refills: 0    Associated Diagnoses: Abdominal pain, unspecified abdominal location       !! - Potential duplicate medications found. Please discuss with provider.        CONTINUE these medications which have CHANGED    Details   hydrALAZINE  (APRESOLINE) 100 MG tablet Take 1 tablet (100 mg) by mouth 3 times daily    Associated Diagnoses: HTN, kidney transplant related           CONTINUE these medications which have NOT CHANGED    Details   acetaminophen (TYLENOL) 325 MG tablet Take 2 tablets (650 mg) by mouth every 4 hours as needed for other (For optimal non-opioid multimodal pain management to improve pain control.)  Qty: 100 tablet, Refills: 3    Associated Diagnoses: Abdominal aortic aneurysm (AAA) without rupture (H24); ST elevation myocardial infarction (STEMI), unspecified artery (H); Peripheral artery disease (H24)      albuterol (PROAIR HFA/PROVENTIL HFA/VENTOLIN HFA) 108 (90 Base) MCG/ACT inhaler Inhale 1-2 puffs into the lungs every 6 hours as needed for shortness of breath, wheezing or cough  Qty: 18 g, Refills: 4    Comments: Pharmacy may dispense brand covered by insurance (Proair, or proventil or ventolin or generic albuterol inhaler)  Associated Diagnoses: Mild intermittent asthma without complication      ASPIRIN LOW DOSE 81 MG chewable tablet CHEW AND SWALLOW 1 TABLET (81 MG) BY MOUTH DAILY  Qty: 90 tablet, Refills: 0    Associated Diagnoses: Kidney replaced by transplant; Abdominal aortic aneurysm (AAA) without rupture (H24); ST elevation myocardial infarction (STEMI), unspecified artery (H); Peripheral artery disease (H24)      atorvastatin (LIPITOR) 80 MG tablet Take 1 tablet (80 mg) by mouth daily  Qty: 90 tablet, Refills: 3    Associated Diagnoses: Abdominal aortic aneurysm (AAA) without rupture (H24); ST elevation myocardial infarction (STEMI), unspecified artery (H); Peripheral artery disease (H24)      calcium carbonate-vitamin D (CALTRATE) 600-10 MG-MCG per tablet TAKE ONE TABLET BY MOUTH TWICE A DAY  Qty: 120 tablet, Refills: 0    Comments: Due for annual wellness exam  Associated Diagnoses: Age-related osteoporosis without current pathological fracture      clopidogrel (PLAVIX) 75 MG tablet Take 1 tablet (75 mg) by mouth  daily  Qty: 90 tablet, Refills: 3    Associated Diagnoses: Coronary artery vasospasm (H24)      esomeprazole (NEXIUM) 40 MG DR capsule Take 1 capsule (40 mg) by mouth every morning (before breakfast) Take 30-60 minutes before eating.  Qty: 30 capsule, Refills: 1    Associated Diagnoses: Globus syndrome; Epigastric pain      ipratropium-albuterol (COMBIVENT RESPIMAT)  MCG/ACT inhaler Inhale 1 puff into the lungs 4 times daily      isosorbide mononitrate (IMDUR) 60 MG 24 hr tablet Take 1 tablet (60 mg) by mouth daily  Qty: 90 tablet, Refills: 2    Associated Diagnoses: Coronary artery vasospasm (H24); Abdominal aortic aneurysm (AAA) without rupture (H24); ST elevation myocardial infarction involving right coronary artery (H); Wound infection after surgery      Lactobacillus-Inulin ([facility] DIGESTIVE [facility]) CAPS TAKE ONE CAPSULE BY MOUTH ONCE DAILY (DUE FOR PHYSICAL IN MARCH)  Qty: 90 capsule, Refills: 3    Associated Diagnoses: Immunosuppression (H24)      metoprolol tartrate (LOPRESSOR) 100 MG tablet Take 1 tablet (100 mg) by mouth daily  Qty: 90 tablet, Refills: 3    Associated Diagnoses: Congestive heart failure, unspecified HF chronicity, unspecified heart failure type (H)      ondansetron (ZOFRAN ODT) 4 MG ODT tab Take 1 tablet (4 mg) by mouth every 8 hours as needed for nausea  Qty: 30 tablet, Refills: 1    Associated Diagnoses: Nausea      !! oxyCODONE (ROXICODONE) 5 MG tablet Take 1 tablet (5 mg) by mouth every 4 hours as needed for severe pain  Qty: 12 tablet, Refills: 0    Associated Diagnoses: Pseudoaneurysm of femoral artery (H24)      predniSONE (DELTASONE) 5 MG tablet Take 1 tablet (5 mg) by mouth daily  Qty: 90 tablet, Refills: 3    Associated Diagnoses: Kidney replaced by transplant; Abdominal aortic aneurysm (AAA) without rupture (H24); ST elevation myocardial infarction (STEMI), unspecified artery (H); Peripheral artery disease (H24)      psyllium (METAMUCIL/KONSYL) 58.6 % powder Take by  mouth every morning      tacrolimus (GENERIC EQUIVALENT) 0.5 MG capsule Take 4 capsules (2 mg) by mouth 2 times daily  Qty: 240 capsule, Refills: 11    Associated Diagnoses: Kidney transplanted; Immunosuppressive management encounter following kidney transplant; Aftercare following organ transplant      !! Nutritional Supplements (BOOST VERY HIGH CALORIE) LIQD Take 8 oz by mouth 2 times daily  Qty: 960 mL, Refills: 0    Associated Diagnoses: Severe protein-calorie malnutrition (H24)      !! Nutritional Supplements (ENSURE CLEAR) LIQD Take 1 Bottle by mouth daily  Qty: 396 mL, Refills: 11    Associated Diagnoses: Abdominal aortic aneurysm (AAA) without rupture (H24); Congestive heart failure, unspecified HF chronicity, unspecified heart failure type (H); Immunosuppression (H24); Protein-calorie malnutrition, unspecified severity (H24)       !! - Potential duplicate medications found. Please discuss with provider.        STOP taking these medications       torsemide (DEMADEX) 10 MG tablet Comments:   Reason for Stopping:             Allergies   Allergies   Allergen Reactions    Blood Transfusion Related (Informational Only) Other (See Comments)     Patient has a history of a clinically significant antibody against RBC antigens.  A delay in compatible RBCs may occur.    Tramadol-Acetaminophen Nausea and Vomiting and Hives    Hydrocodone Nausea and Vomiting and Hives

## 2023-10-02 NOTE — PROGRESS NOTES
Observation goals  PRIOR TO DISCHARGE       Comments: -diagnostic tests and consults completed and resulted met   -vital signs normal or at patient baseline partially met waiting on Home o2 to be delivered  -tolerating oral intake to maintain hydration met   -adequate pain control on oral analgesics met   Nurse to notify provider when observation goals have been met and patient is ready for discharge.

## 2023-10-02 NOTE — PLAN OF CARE
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      10/1/2023 [id number]    Orientation A & O x 4    Vitals/Tele VSS on 2L o2    IV Access/drains no IV access awaiting discharge     Diet Low fiber     Mobility SBA     GI/ Continent     Wound/Skin no adjustments needed     Consults Home care     Patient has been discharged October 1, 2023 9:15 PM. Discharge instructions and education reviewed, medication schedule and follow up appts discussed. Pt had no questions. All belongings sent with pt. Including discharge meds and home oxygen supplies pt currently on 2L o2.  Wheel chair to door 6 and then family will transport home.        See Flow sheets for assessment

## 2023-10-02 NOTE — TELEPHONE ENCOUNTER
AMARI, sent IMshopping to schedule the following appointment:    Follow up with Dr. Russ for ITZEL anal cancer. Per IB from Melody BALL RN, schedule on 11/6/23 at 9:30 am, slot is held. Left K pod #

## 2023-10-02 NOTE — TELEPHONE ENCOUNTER
Left voicemail for patient to call back to schedule appointment(s), provided telephone number for patient to call back to schedule.,

## 2023-10-03 NOTE — TELEPHONE ENCOUNTER
Patient scheduled for the below follow-up with Dr Barker. Patient is scheduled as a return visit, based off the information from Dr Barker message.    Routing to RN to advise if this is scheduled correctly OR if patient needs to be scheduled as a  NEW 60-minute consult.

## 2023-10-03 NOTE — TELEPHONE ENCOUNTER
Patient should be scheduled as a 30 min hospital follow up appointment. The way patient is scheduled is good.     Lizzeth BLAND RN    [facility]  Vascular [facility]  Office: [phone]  Fax: [phone]

## 2023-10-04 NOTE — TELEPHONE ENCOUNTER
Pt recently discharged again from hospital - I would like follow-up   Can she see me (or video visit) Tuesday October 10th?  Any held or travel spots  Thanks  PN

## 2023-10-04 NOTE — TELEPHONE ENCOUNTER
Called and LVM To call back and get scheduled for a in clinic follow up per Pn's msg below  [facility] Unit Coordinator

## 2023-10-06 NOTE — TELEPHONE ENCOUNTER
PN - FYI.      Gave Vanessa from  home care verbal ok to delay start of care to today.      Thanks,  Monse Hopson RN

## 2023-10-06 NOTE — TELEPHONE ENCOUNTER
Order/Referral Request    Who is requesting: Vanessa    Orders being requested: Delay start  of care to today 10/6/23    Reason service is needed/diagnosis:     When are orders needed by:     Has this been discussed with Provider: No    Does patient have a preference on a Group/Provider/Facility?     Does patient have an appointment scheduled?: No    Where to send orders: [phone]    Could we send this information to you in [facility] or would you prefer to receive a phone call?:   Patient would prefer a phone call   Okay to leave a detailed message?: Yes at Other phone number:  [phone]

## 2023-10-06 NOTE — LETTER
October 10, 2023      Maribel Yang  [address]  [place] [address]        Dear Maribel,     Our records show that you are due for a Medication Therapy Management (MTM) appointment. This is an appointment to meet with a pharmacist in the clinic to make sure you get the most out of your medications.  Your medications play an important role in your health.  We would like to meet with you to review how your medications are working for you and to answer any questions you may have.       We are available in the clinic on Tuesday and Thursday for phone, video or in-person appointments.  To make an appointment, please call the clinic at [phone] or the MTM scheduling line at [phone].    We hope to see you soon!       Sincerely,      Re Rogers, KalebD  Kaleb ParkerD, JUAN F, BCACP   Medication Therapy Management Providers, [facility]

## 2023-10-06 NOTE — TELEPHONE ENCOUNTER
MTM referral from: Transitions of Care (recent hospital discharge or ED visit)    MTM referral outreach attempt #2 on October 6, 2023 at 11:35 AM      Outcome: Patient not reachable after several attempts, will route to MT Pharmacist/Provider as an FYI.  [facility] scheduling number is [phone].  Thank you for the referral.    Use(valentina) [facility] part d map for the carrier/Plan on the flowsheet      [employer] Message Sent    Rashaad De Jesus  [facility]

## 2023-10-09 NOTE — TELEPHONE ENCOUNTER
Forms/Letter Request    Type of form/letter:  home care -    SN 1 wk 3 wk, 4 prn.  Start date 10/7/23-end date 10/27/23  Medical social worker to assess and eval- start date 10/6/23 end date 11/6/23  OT to assess and eval-start date 10/6/23-end date 11/6/23      Have you been seen for this request: N/A    Do we have the form/letter: Yes:     Who is the form from? Home care    Where did/will the form come from? form was faxed in    When is form/letter needed by: asap    How would you like the form/letter returned: Fax : [phone]    Patient Notified form requests are processed in 3-5 business days:No    Could we send this information to you in [employer] [facility] or would you prefer to receive a phone call?:   No preference     Okay to leave a detailed message?: No at Other phone number:  [facility]-[phone]

## 2023-10-11 NOTE — TELEPHONE ENCOUNTER
Forms/Letter Request    Type of form/letter:   Verbal Order  Order date: 10/11/2023  Cert start date: 10/6/2023  Cert End date: 12/4/2023    1 Assess and assist patient /caregiver...  2 Assessment and education...  3 Educate patient/caregiver to access services...  4 Linkage/advocacy to community resources...  5 Pt will verbalize and understanding of provided resources...    Have you been seen for this request: N/A    Do we have the form/letter:   Faxed to the [facility]    Who is the form from?   [facility]    Where did/will the form come from? form was faxed in    When is form/letter needed by: n/a    How would you like the form/letter returned:   Fax: [phone]    Patient Notified form requests are processed in 3-5 business days:No    Could we send this information to you in [email or web address] or would you prefer to receive a phone call?:   n/a    Placed in Dr. Martinez's box.

## 2023-10-13 NOTE — TELEPHONE ENCOUNTER
Forms/Letter Request    Type of form/letter:   Verbal order form    X- Skilled nursing  X- Physical therapy    Provider ordered SOC date,10/6/23    Have you been seen for this request: N/A    Do we have the form/letter:   Faxed to the [facility]    Who is the form from?   [facility]    Where did/will the form come from? form was faxed in    When is form/letter needed by: asap    How would you like the form/letter returned:   Fax: [phone]    Patient Notified form requests are processed in 3-5 business days:No    Could we send this information to you in [employer] or would you prefer to receive a phone call?:   n/a    Placed in Dr. Martinez's box.

## 2023-10-13 NOTE — TELEPHONE ENCOUNTER
REFERRAL INFORMATION:  Referring Provider: Dr. Mary Kate Carias MD   Referring Clinic:  [facility]   Reason for Visit/Diagnosis: Abdominal pain; ER f/u     FUTURE VISIT INFORMATION:  Appointment Date: 10/24/23  Appointment Time: 1 PM     NOTES STATUS DETAILS   OFFICE NOTE from Referring Provider [facility]:  9/23/23- 10/1/23- ED to Hosp-Admission for abdominal pain    OFFICE NOTE from Other Specialist [facility] [place]:  7/20/21- OV with Lisa Martinez DO for abd pain   HOSPITAL DISCHARGE SUMMARY/  ED VISITS [facility] [place] [place]:  3/2/23- ED visit with Saúl Seaman for nausea, vomiting, and diarrhea   OPERATIVE REPORT [facility] [place]:  6/27/23- RIGHT FEMORAL TO PROFUNDA FEMORIS ARTERY BYPASS WITH cadaveric femoral-popliteal artery, REPAIR OF RIGHT GROIN PSEUDOANEURYSM with Abena Ferrara MD    4/30/21- Double lumen PICC Placement with Moo Hernandez RN    9/2004- Kidney transplant    MEDICATION LIST Internal         ENDOSCOPY  Internal [place]:  2/2/23- EGD   COLONOSCOPY [facility] [place]:  7/28/22- Colonoscopy   8/9/17- Colonoscopy   ERCP N/A    EUS N/A    STOOL TESTING [facility] [place]:  8/31/23- C diff x2  8/31/23- Enteric bacteria and virus panel  3/2/23- Enteric bacteria and virus panel   3/2/23- Cryptosporidium/ giardia immunoassay  3/2/23- Ova and parasite exam routine  7/26/21- C diff    PERTINENT LABS Internal    PATHOLOGY REPORTS (RELATED) [facility] [place]:  2/2/23- esophagus biopsies; Case: [id number]   7/28/22- colon biopsies; Case: [id number]    IMAGING (CT, MRI, EGD, MRCP, Small Bowel Follow Through/SBT, MR/CT Enterography) [facility]:  9/27/23- US Mesenteric arteries   9/26/23- XR Colon  9/23/23- CT Abd/Pelvis     [facility] [place]:  7/20/21- XR Abd    [facility]:  6/6/23- CTA Chest/Abd/Pelvis   8/25/21- CTA Chest/Abd/Pelvis   4/23/21- CT Abd/Pelvis   3/22/21- CTA Chest/Abd/Pelvis  3/1/21- CT Chest/Abd/Pelvis     [facility]:  2/13/20-  CT Chest/Abd/Pelvis

## 2023-10-16 NOTE — TELEPHONE ENCOUNTER
[facility] VASCULAR [facility] CENTER    Who is the name of the provider?:  Dr Barker    What is the location you see this provider at/preferred location?: [place]  Person calling / Facility: Maribel Yang  Phone number:  [phone]  Nurse call back needed:  YES     Reason for call:  Patient asking if 10/16 visit with Dr Barker can be virtual     Pharmacy location:  n/a  Outside Imaging: n/a   Can we leave a detailed message on this number?  YES

## 2023-10-16 NOTE — TELEPHONE ENCOUNTER
Patient needs to be seen in-person.    Lizzeth BLAND, RN    [facility]  Office: [phone]  Fax: [phone]

## 2023-10-17 NOTE — TELEPHONE ENCOUNTER
Forms/Letter Request    Type of form/letter:  home care -  1.assess and assist w/community resources 10/11/23  2. Assessment and re: social situation and patient's needs 10/11/23  3. Education patient/caregiver to access services needed 10/11/23  4. Linkage/advocacy to community resources as needed  10/11/23  4. Pt will verbalize an understanding of provided resources 10/11/23    Have you been seen for this request: N/A    Do we have the form/letter: Yes    Who is the form from? Home care    Where did/will the form come from? form was faxed in    When is form/letter needed by: asap    How would you like the form/letter returned: Fax : [phone]    Patient Notified form requests are processed in 3-5 business days:No    Could we send this information to you in [facility] or would you prefer to receive a phone call?:   No preference     Okay to leave a detailed message?: No at Other phone number:  [facility]- [phone]

## 2023-10-17 NOTE — TELEPHONE ENCOUNTER
Left voicemail with instructions for patient to call back to schedule their appointment(s)    October 17, 2023 , 2:50 PM

## 2023-10-18 NOTE — PROGRESS NOTES
Maribel is a 72 year old who is being evaluated via a billable video visit.      How would you like to obtain your AVS? MyChart  If the video visit is dropped, the invitation should be resent by: Text to cell phone:   Will anyone else be joining your video visit? No          Assessment & Plan     Globus syndrome  Refilled medication -   Was given protonix in the hospital but not covered by insurance in the past   - esomeprazole (NEXIUM) 40 MG DR capsule; Take 1 capsule (40 mg) by mouth every morning (before breakfast) Take 30-60 minutes before eating.    Epigastric pain     - esomeprazole (NEXIUM) 40 MG DR capsule; Take 1 capsule (40 mg) by mouth every morning (before breakfast) Take 30-60 minutes before eating.    Abdominal aortic aneurysm (AAA) without rupture, unspecified part (H24)       AMY (acute kidney injury) (H24)  Acute on chronic kidney - baseline creatinine 1.8-.19  At discharge creatinine 2.5 -   Will recheck  Have ordered home EMS for flu vaccine and lab draw   - CBC with platelets; Future  - Comprehensive metabolic panel (BMP + Alb, Alk Phos, ALT, AST, Total. Bili, TP); Future    Anemia, unspecified type   Anemia over past month -   Will check some labs -   Suspect multifactorial   CKD, severity of COVID infection   - CBC with platelets; Future  - Iron and iron binding capacity; Future    Infection due to 2019 novel coronavirus   Initial cause of recent hospitalizations since the end of August    Kidney replaced by transplant     - Community Paramedic Referral; Future  - Comprehensive metabolic panel (BMP + Alb, Alk Phos, ALT, AST, Total. Bili, TP); Future    Hypomagnesemia     - Comprehensive metabolic panel (BMP + Alb, Alk Phos, ALT, AST, Total. Bili, TP); Future  - Magnesium; Future    Immunosuppressed status (H24)     - Comprehensive metabolic panel (BMP + Alb, Alk Phos, ALT, AST, Total. Bili, TP); Future    COPD exacerbation (H)  Using home O2 - 2.5 liters    Congestive heart failure, unspecified  HF chronicity, unspecified heart failure type (H)              MED REC REQUIRED  Post Medication Reconciliation Status:  Discharge medications reconciled and changed, see notes/orders      Lisa Martinez DO  [facility] JASPREET López is a 72 year old, presenting for the following health issues:  Hospital F/U        9/13/2023     9:48 AM   Additional Questions   Roomed by Lina FLORES       Hospital Follow-up Visit:    Hospital/Nursing Home/IP Rehab Facility: [facility]  Date of Admission: 09/23/2023  Date of Discharge: 10/01/023  Reason(s) for Admission: abdominal pain, HTN, covid, Kidney    Was your hospitalization related to COVID-19? YES   How are you feeling today? Better  In the past 24 hours have you had shortness of breath when speaking, walking, or climbing stairs? My breathing issues have stayed the same  Do you have a cough? I don't have a cough  When is the last time you had a fever greater than 100? no  Are you having any other symptoms? Not sleeping well at night and then will get abd. pain  Do you have any other stressors you would like to discuss with your provider? No             Was the patient in the ICU or did the patient experience delirium during hospitalization?          Problems taking medications regularly:  None  Medication changes since discharge: discontinued water pill and they increased dosage for hydralazine to 100mg  Problems adhering to non-medication therapy:  None    Summary of hospitalization:  [facility] discharge summary reviewed  Diagnostic Tests/Treatments reviewed.  Follow up needed: labs and specialist   Other Healthcare Providers Involved in Patient s Care:         Homecare  Update since discharge: improved.         Breathing - getting slightly better.  Has home oxygen - since COVID  Pt diagnosed with COVID and hospitalized late August and readmitted on 9/5/2023- 9/10/2023  Hypoxic respiratory  failure, COPD -  Treated with 3 day course of remdesivir and dexamethasone on her initial hospitalization and dexamethasone was repeated on the subsequent hospitalization.  Respiratory status mildly improved at discharge and has been very slow to recover.  Prior to hospitalization she did not require any home oxygen.  Currently she is on 2.5  liters - leaves on most of the time.  Only takes it off to eat    Patient was readmitted on 9/23/2023 for severe abdominal pain and diarrhea.  Extensive work-up did not reveal a specific etiology.  Patient had mildly elevated leukocytosis and CRP.  CT scan was essentially negative and her EGD and colonoscopy were completed in 2023 and 2022 respectively.  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    Kidney transplant.  Patient has a history of a transplant from 2004.  Currently on immunosuppression with tacrolimus and prednisone.  In the hospital had bumped up to 2.5 from her baseline around 1.9.    This discharge patient states she is started to feel more energy and is doing well.  The oxygen is helping her.  She admits to an increase in her appetite.  Patient does have a home nurse visiting once a week for 3 weeks as well as a .  There was some discussion about possible evaluation with palliative for morphine.  Patient does have concerns about this with her son's history of addiction.  We reviewed the option of a palliative consultation however patient would like to hold off at this time.    Has gained about 5 pounds.  Unclear if this is related to fluid or possibly dietary.  Patient does admit to having swelling in her feet.  She admits to some good days and some bad days.  Her diuretic was held in the hospital due to her worsening creatinine.       Plan of care communicated with patient                   Review of Systems         Objective            Vitals:  No vitals were obtained today due to virtual visit.    Physical Exam   GENERAL: alert, no distress, frail, elderly, and wearing home O2 via nasal canula  EYES: Eyes grossly normal to inspection.  No discharge or erythema, or obvious scleral/conjunctival abnormalities.  RESP: No audible wheeze, cough, or visible cyanosis.  No visible retractions or increased work of breathing.    SKIN: Visible skin clear. No significant rash, abnormal pigmentation or lesions.  NEURO: Cranial nerves grossly intact.  Mentation and speech appropriate for age.  PSYCH: Mentation appears normal, affect normal/bright, judgement and insight intact, normal speech and appearance well-groomed.                Video-Visit Details    Type of service:  Video Visit   Video Start Time:  2:45pm  Video End Time: 3:15pm    Originating Location (pt. Location): Home    Distant Location (provider location):  On-site  Platform used for Video Visit: Mikaela

## 2023-10-23 NOTE — PROGRESS NOTES
A Community Paramedic visit was scheduled for 11/1/23@1200 for a Flu vaccination and blood draw.    Radha Irene NRP, CP  Community Paramedic  Phone number [phone]  [email or web address]

## 2023-10-23 NOTE — PROGRESS NOTES
Outreach x 1 for Community Paramedic services.    Left VM for return call.  Will reach out again in 1-2 business days.    Radha Irene NRP, CP  Community Paramedic  Phone number [phone]  [email or web address]

## 2023-10-23 NOTE — TELEPHONE ENCOUNTER
Forms/Letter Request    Type of form/letter:  Home health certification and plan of care.  Date 10/6/23-12/04/23    Have you been seen for this request: N/A    Do we have the form/letter: Yes:     Who is the form from? [facility] care-[facility]     Where did/will the form come from? form was faxed in    When is form/letter needed by: asap    How would you like the form/letter returned: Fax : [phone]    Patient Notified form requests are processed in 3-5 business days:No    Could we send this information to you in [employer] or would you prefer to receive a phone call?:   N/A

## 2023-10-25 NOTE — CONFIDENTIAL NOTE
Sent upcoming appointment reminder via [email or web address].     Appt date/time: 11/06/2023 at 9:30 am  Provider: Dr. Carson Russ  Appt type: Return pt, ITZEL for anal cancer surveillance     Maria G Lee CMA

## 2023-10-27 NOTE — TELEPHONE ENCOUNTER
Forms/Letter Request    Type of form/letter:   Verbal order date: 10/26/2023     Discontinue: effective 10/27/23  skilled nursing 1 wk 3 wk: 4 PRN: any change in condition...    Have you been seen for this request: N/A    Do we have the form/letter:   Faxed to the [facility]    Who is the form from?   [facility]    Where did/will the form come from? form was faxed in    When is form/letter needed by: n/a    How would you like the form/letter returned:   Fax: [phone]    Patient Notified form requests are processed in 3-5 business days:No    Could we send this information to you in [employer] or would you prefer to receive a phone call?:   n/a    Placed in Dr. Martinez's box.

## 2023-11-01 NOTE — TELEPHONE ENCOUNTER
Man LEWIS from [facility] lab called.    Patient's Magnesium is 0.5      Monse Hopson RN

## 2023-11-01 NOTE — TELEPHONE ENCOUNTER
Tried to reach patient   Had to leave voice message that the magnesium is too low  If any symptoms she should go to ER for IV fluids with magnesium    At first I recommended oral but I really feel like she may need some IV magnesium to prevent a seizure    Triage - please try to reach out to Maribel to let her know  Thanks  PN

## 2023-11-01 NOTE — PROGRESS NOTES
"[facility] Paramedic One Time Visit    November 1, 2023; 2:53 PM    Maribel Yang is a 72 year old year old adult being seen at home visit    Present at appointment:  patient    Vitals:  BP Readings from Last 1 Encounters:   11/01/23 104/80     Pulse Readings from Last 1 Encounters:   11/01/23 82     Wt Readings from Last 1 Encounters:   09/30/23 42.6 kg (94 lb)     Ht Readings from Last 1 Encounters:   09/24/23 1.549 m (5' 1\")         Clinical Concerns:  Current Medical Concerns:  Need for (Vaccination; Lab draw/sample)    Education Provided to patient: Monitor for signs and symptoms of allergic reaction to vaccine, call 911 if needed   Flu Shot given: Yes  COVID Vaccine Given: No  Lab draw or specimen collection: Yes      Plan:     Time spent with patient: 30    The patient meets one or more of the following criteria:  * Requires services to prevent readmission to a nursing home or hospital    Acute concern/Follow-up recommendations: One- time visit, will need new CP referral if additional needs arise    Issues for Provider to follow up on: [facility] Paramedic visited patient in home, provided one-time visit.     A Flu vaccination was administered and the pt observed for 20 minutes after with no adverse effects observed.    A venous blood draw was obtained without issue.    "

## 2023-11-01 NOTE — TELEPHONE ENCOUNTER
Left message for patient to call [facility] back  When patient calls back please transfer to an RN  Radha OSBORN RN      Will retry at a later time as well

## 2023-11-02 NOTE — TELEPHONE ENCOUNTER
PN,       Please see Mychart message.      Note from yesterday:  Tried to reach patient   Had to leave voice message that the magnesium is too low  If any symptoms she should go to ER for IV fluids with magnesium     At first I recommended oral but I really feel like she may need some IV magnesium to prevent a seizure     Triage - please try to reach out to Maribel to let her know  Thanks  PN       Thanks,  Monse Hopson RN

## 2023-11-03 NOTE — TELEPHONE ENCOUNTER
PN,  Spoke with patient.  States she is already noticing the difference today after starting the magnesium tablets two nights ago.    RN attempted to schedule for lab visit.  Patient states they do not have a way to get here right now, plus she had a walker and her oxygen.  Liked the convenience of the paramedics coming.  Wondering if she can do that again?    Please advise.  Wilda ASHTON RN

## 2023-11-03 NOTE — TELEPHONE ENCOUNTER
For sure if weaker she needs to go to ER for IV replacement    I would like to recheck another magnesium on Mon or Tuesday to make sure increasing.  Can she come in for a lab only visit?    PN

## 2023-11-07 NOTE — PROGRESS NOTES
Community Paramedic Program    CHW Community/Care Team Outreach    Received referral for weekly lab draws until her magnesium level stabilizes. Pt had recent CP visit for lab draw and flu vaccination. Due to Community Paramedic program being cut and ending December 1st, team is unable to assist with weekly visits and lab draws for pt at home.     Will share [facility] ([email or web address] 481.285.7160) as potential resource for weekly lab draws for pt at home. [facility] accepts some health insurance plans, listed on their web site, and otherwise visits are $75 out of pocket.     Routing to pt's PCP and CP for awareness.    Malena Cohn  Community Health Worker  Community Paramedic program  [phone]  [email or web address]

## 2023-11-24 NOTE — ED PROVIDER NOTES
History     Chief Complaint:  Fall       The history is provided by the patient.      Maribel Yang is a 72 year old female with a complex history including osteoporosis, DVT, CAD, hypertension, s/p kidney transplant, and COPD who presents with a fall. Approximately 6 days ago, she was trying to clean her house when she attempted to turn around and fell down. She injured her right foot at the time, but is unsure if she hit her head. She noticed bruising to her right foot several days later, and it has increased in size and become more painful since then. However, she is still able to ambulate on the foot and normally uses a walker. Additionally, she has some bruising around the left eye. She denies having a headache.     Independent Historian:   None - Patient Only    Review of External Notes:   Patient had recently diagnosed low magnesium levels.    Medications:    Albuterol   Aspirin   Atorvastatin   Clopidogrel   Esomeprazole   Hydralazine    Combivent Respimat   Imdur   Lopressor   Ondansetron   Oxycodone    Prednisone    Tacrolimus     Past Medical History:    Age-related osteoporosis   Anemia   BCC   CHF, unspecified type   COPD (chronic obstructive pulmonary disease) (H)  Depressive disorder  Heart attack (H)  History of blood transfusion  Hypertension  Kidney replaced by transplant  PAD (peripheral artery disease) (H24)  Squamous cell lung cancer (H)  Thrombosis of leg  Unspecified disorder of kidney and ureter  HTN, kidney transplant related  YUNIOR III (vulvar intraepithelial neoplasia III)  Peripheral artery disease (H24)  Squamous cell lung cancer (H)  Lumbago  Vaginal dysplasia  Alopecia  Cherry angioma  Malignant neoplasm of anal canal (H)  Age-related osteoporosis without current pathological fracture  Acute deep vein thrombosis (DVT) of proximal vein of lower extremity, unspecified laterality (H)  Chronic kidney disease, stage 3 (H)  Abdominal aortic aneurysm (AAA) without rupture (H24)  Physical  deconditioning  Coronary artery disease involving native coronary artery with angina pectoris with documented spasm (H24)  Mild protein-calorie malnutrition (H24)  Anemia, iron deficiency  COPD exacerbation (H)  Acute on chronic renal insufficiency    Past Surgical History:    Kidney bx   Lung bx   Breast bx   Anal bx   B/L femoral bypass graft   Colonoscopy w/ polypectomy/bx   LEEP conization   B/L Endovascular Abdominal Aortic Aneurysm Repair  EGD w/ bx  Unspecified eye surgery   R pseudoaneurysm repair   Thrombectomy   B/L eyes surgery   L oophorectomy   Kidney transplant     Physical Exam   Patient Vitals for the past 24 hrs:   BP Temp Temp src Pulse Resp SpO2   11/23/23 1920 (!) 152/102 -- -- 85 16 98 %   11/23/23 1901 (!) 166/111 -- -- -- -- --   11/23/23 1858 -- 97.8  F (36.6  C) Oral 84 16 96 %        Physical Exam  General: Alert, seated upright, frail.  HENT: mucous membranes moist, poor dentition.  PERRL, EOMI.  Head/face: Green-colored bruising to the left forehead without persistent hematoma, old blood tracking to the left periorbital and left eyelid.  No edema.    CV: regular rate, regular rhythm  Resp: normal effort, clear throughout, no crackles or wheezing  GI: abdomen soft and nontender, no guarding  MSK: no bony tenderness  Skin: appropriately warm and dry  Extremities: Full range of motion bilateral arms, bilateral legs.  She has swelling involving the medial malleolus, with some subacute bruising.  Axonal tenderness to the medial malleolus, no tenderness to the lateral malleolus, midfoot, or fifth metatarsal.  Able to dorsiflex and plantarflex the foot without pain, wiggles toes, 2+ DP pulse.  Neuro: alert, clear speech, oriented  Psych: normal mood and affect      Emergency Department Course   Imaging:  CT Head w/o Contrast   Final Result   IMPRESSION:     1.  No evidence of acute intracranial hemorrhage or mass effect.   2.  Mild nonspecific white matter changes.   3.  Mild brain parenchymal  volume loss.      Foot  XR, G/E 3 views, right   Final Result   IMPRESSION: No fracture. Mild hallux valgus. No degenerative changes. No soft tissue swelling. There are two small calcifications along the anterior margin of the distal tibial diaphysis.      Ankle XR, G/E 3 views, right   Final Result   IMPRESSION: No fracture. Mild hallux valgus. No degenerative changes. No soft tissue swelling. There are two small calcifications along the anterior margin of the distal tibial diaphysis.         Results per radiology     Laboratory:  Labs Ordered and Resulted from Time of ED Arrival to Time of ED Departure - No data to display     Emergency Department Course & Assessments:    Interventions:  Medications - No data to display     Assessments:  1902 I obtained history and examined the patient as noted above.  1956 I rechecked the patient and explained findings.    Independent Interpretation (X-rays, CTs, rhythm strip):  I reviewed the patient's foot and ankle x-rays, and did not see any fractures in either of them.    Consultations/Discussion of Management or Tests:  none     Social Determinants of Health affecting care:   None    Disposition:  The patient was discharged to home.     Impression & Plan    Medical Decision Making:  Maribel Yang is a 72 year old female with a complex history including osteoporosis, DVT, CAD, hypertension, s/p kidney transplant, and COPD currently on Plavix who presents today with forehead hematoma, as well as right ankle hematoma and pain with ambulation.  On exam, the patient is illogically intact, with normal mental status.  She has been ambulatory close to her baseline, though does have pain with putting weight on the right foot.  She is primarily concerned about bruising in the right foot, especially since she is on Plavix.  Radiographs are negative for fracture or ligamentous instability.  She does have good range of motion, no concerns for other fracture including fracture of the  knee.  CT was obtained of the head, given extensive bruising noted of the forehead and face.  Fortunately, this is negative for intracranial hemorrhage, or skull fracture.  At this point, she feels safe returning home.  I recommended conservative measures, including keeping the ankle elevated, using Tylenol as needed for pain, and perform gentle range of motion exercises to maintain flexibility.  Return to the ED right away for altered mental status, increased pain, increased swelling, or for any other concerns.        Diagnosis:    ICD-10-CM    1. Closed head injury, initial encounter  S09.90XA       2. Sprain of right ankle, unspecified ligament, initial encounter  S93.401A            Discharge Medications:  New Prescriptions    No medications on file          Scribe Disclosure:  I, Marco Antonio Rivas, am serving as a scribe at 7:43 PM on 11/23/2023 to document services personally performed by Radha Rodríguez MD based on my observations and the provider's statements to me.   11/23/2023   Radha Rodríguez MD Pepper, Tracy Lynn, MD  11/25/23 4407

## 2023-11-24 NOTE — ED TRIAGE NOTES
Pt reports a fall last Friday.  States she has a worsening painful bruise on her right ankle that started after this fall.  Pt unsure if she hit head during fall, on blood thinners.     Triage Assessment (Adult)       Row Name 11/23/23 0802          Triage Assessment    Airway WDL WDL        Respiratory WDL    Respiratory WDL WDL        Cardiac WDL    Cardiac WDL WDL        Peripheral/Neurovascular WDL    Peripheral Neurovascular WDL WDL        Cognitive/Neuro/Behavioral WDL    Cognitive/Neuro/Behavioral WDL WDL

## 2023-11-25 NOTE — TELEPHONE ENCOUNTER
Critical magnesium 0.8 was drawn yesterday at 11:15. [facility] does not take critical lab for [place], lab given correct number to call.  Anjana Wells RN on 11/25/2023 at 8:20 AM    Reason for Disposition   Lab result questions   Lab or radiology calling with CRITICAL test results    Additional Information   Negative: [1] Caller is not with the adult (patient) AND [2] reporting urgent symptoms   Negative: Lab calling with strep throat test results and triager can call in prescription   Negative: Lab calling with urinalysis test results and triager can call in prescription   Negative: Medication questions   Negative: Medication renewal and refill questions   Negative: Pre-operative or pre-procedural questions   Negative: ED call to PCP (i.e., primary care provider; doctor, NP, or PA)   Negative: Doctor (or NP/PA) call to PCP   Negative: Call about patient who is currently hospitalized    Protocols used: Information Only Call - No Triage-A-AH, PCP Call - No Triage-A-AH

## 2023-11-25 NOTE — TELEPHONE ENCOUNTER
Critical labs. I gave the  the page  number for [facility] because we do not take critical labs for the [facility] clinics, only [facility]. She will give the lab person the phone number and will have them talk with the answering service directly.  Scarlett Marshall RN  [facility]     Reason for Disposition   [1] Follow-up call to recent contact AND [2] information only call, no triage required    Additional Information   Negative: [1] Caller is not with the adult (patient) AND [2] reporting urgent symptoms   Negative: Lab result questions   Negative: Medication questions   Negative: Caller can't be reached by phone   Negative: Caller has already spoken to PCP or another triager   Negative: RN needs further essential information from caller in order to complete triage   Negative: Requesting regular office appointment   Negative: [1] Caller requesting NON-URGENT health information AND [2] PCP's office is the best resource   Negative: Question about upcoming scheduled test, no triage required and triager able to answer question   Negative: [1] Caller is not with the adult (patient) AND [2] probable NON-URGENT symptoms   Negative: Health Information question, no triage required and triager able to answer question   Negative: General information question, no triage required and triager able to answer question    Protocols used: Information Only Call - No Triage-A-

## 2023-11-25 NOTE — TELEPHONE ENCOUNTER
Critical labs. I gave the  the page  number for [facility] because we do not take critical labs for the [facility] clinics, only [facility]. She will give the lab person the phone number and will have them talk with the answering service directly.  Scarlett Marshall RN  [facility]    Reason for Disposition   [1] Follow-up call to recent contact AND [2] information only call, no triage required    Additional Information   Negative: [1] Caller is not with the adult (patient) AND [2] reporting urgent symptoms   Negative: Lab result questions   Negative: Medication questions   Negative: Caller can't be reached by phone   Negative: Caller has already spoken to PCP or another triager   Negative: RN needs further essential information from caller in order to complete triage   Negative: Requesting regular office appointment   Negative: [1] Caller requesting NON-URGENT health information AND [2] PCP's office is the best resource   Negative: Question about upcoming scheduled test, no triage required and triager able to answer question   Negative: [1] Caller is not with the adult (patient) AND [2] probable NON-URGENT symptoms   Negative: Health Information question, no triage required and triager able to answer question   Negative: General information question, no triage required and triager able to answer question    Protocols used: Information Only Call - No Triage-A-

## 2023-11-27 PROBLEM — N18.32 STAGE 3B CHRONIC KIDNEY DISEASE (H): Status: ACTIVE | Noted: 2021-03-21

## 2023-11-27 NOTE — TELEPHONE ENCOUNTER
I was able to complete telephone only appointment with them and communicated with [place] emergency department for her to be evaluated and need for significant hypomagnesia   She has been on oral milk magnesia (according to patient )and still has low magnesium.    Eventually should be seen by nephrologist as well- unsure who is primary nephro

## 2023-11-27 NOTE — PROGRESS NOTES
Maribel is a 72 year old who is being evaluated via a billable telephone visit.      What phone number would you like to be contacted at? [phone]  How would you like to obtain your AVS? 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    Hypomagnesemia 0.8 on 24th NOV and 0.8 on 11/1/23  Other fatigue  Muscle weakness (generalized)  Plan :reviewed & confirmed symptoms with patient- she does complains of muscle weakness more.  also concurred that he has been concerned about her easy fatigue, and shortness of breath as well.recently had a fall and seen in emergency department on 11/23 as was trying to clean.    She has started milk of magnesia- and I do not think that's enough to replace it.  I do recommend recheck, including EKG and supplemental magnesium- since its been unchanged since 3 weeks and she is symptomatic     I have spoken  about them with emergency department provider and they are expecting them to arrive on own transport as  is willing to take [place] to [place] closer to [place] via uber and they refused transfer via ambulance as she is only moderate fatigue and able to walk and without chest pain .          HTN, kidney transplant related  Stage 3b chronic kidney disease (H)  Kidney transplanted      Mild protein-calorie malnutrition (H24)  Globus syndrome  Epigastric pain          Squamous cell carcinoma of lung, unspecified laterality (H)      Coronary artery vasospasm (H24)  Congestive heart failure, unspecified HF chronicity, unspecified heart failure type (H)  Abdominal aortic aneurysm (AAA) without rupture (H24)          Immunosuppressive management encounter following  kidney transplant    Aftercare following organ transplant        I spent a total of 40 minutes on the day of the visit.   Time spent by me doing chart review, history and exam, documentation and further activities per the note      Nilda Soto MD  [facility]    Gisele López is a 72 year old, presenting for the following health issues:  Fatigue (/LOW MAGNESIUM LEVELS/)      11/27/2023    12:14 PM   Additional Questions   Roomed by ALEX JAVIER   Accompanied by NALDEN         11/27/2023    12:14 PM   Patient Reported Additional Medications   Patient reports taking the following new medications N/A       HPI   Telephone appointment to discuss hypomagnesia   Reports has been taking oral magnesium supplements all along.  Spoke with both patient and her   Sleeping a lot  Tired more than usual  Muscle weakness.  Low appetite  Some abdomen pain  NO transportation- and has been staying home for that reason    S/p kidney transplant 2004 and severely immunosupressed         Review of Systems   CONSTITUTIONAL:fatigue  ENT/MOUTH: NEGATIVE for ear, mouth and throat problems  RESP:SOB/dyspnea  CV: NEGATIVE for chest pain, palpitations or peripheral edema  GI: dyspepsia, poor appetite, and weight loss  MUSCULOSKELETAL: NEGATIVE for significant arthralgias or myalgia      Objective           Vitals:  No vitals were obtained today due to virtual visit.    Physical Exam   alert and fatigued  PSYCH: Alert and oriented times 3; coherent speech, normal   rate and volume, able to articulate logical thoughts, able   to abstract reason, no tangential thoughts, no hallucinations   or delusions  Her affect is normal  RESP: No cough, no audible wheezing, able to talk in full sentences  Remainder of exam unable to be completed due to telephone visits    No results found for any visits on 11/27/23.            Phone call duration: 23 minutes

## 2023-11-27 NOTE — TELEPHONE ENCOUNTER
Creatinine = 2.85  ( 11/24/23)     Hitesh See MD  11/26/2023  5:40 AM CST       Elevated serum creatinine and recommend usual assessment for fever, recent illness, pain over kidney allograft, blood pressure and volume status, as well as would repeat labs and if no improvement, may consider a kidney transplant biopsy.  Would also recommend patient establish care with a general Nephrologist.       PLAN:  Check for recent illness.  Any fever?  Any missed medication?  Changes in medication, (shaheed diuretics)?  Any pain over the transplanted kidney?  Any nausea / vomiting / diarrhea?  Dehydrated?   Is BP low?   Any dizziness or light headedness when standing or walking?  If dehydrated, and unable to hydrate orally, can schedule IV fluids and repeat BMP after.  Recommend improving hydration and recheck BMP in 1 - 2 weeks.  Add UA/UC.      OUTCOME:  Denies any illness, N/V/D  States she had some abdominal pain but attributes it when she is constipated.  Reports BP has been stable.  Reports she sips water all throughout the day.  However her urine has been dark.  Discussed if unable to maintain hydration orally, we can set her up with IV fluids (1L NS?)  States she was instructed to go to ED d/t hypomagnesemia, she will request IV fluids then too.  Discussed repeating BMP in 2 weeks.  Order placed.

## 2023-11-28 NOTE — ED NOTES
"Tele-PIT/Intake Evaluation      Video-Visit Details    Type of service:  Video Visit    Video Start Time (time video started): 1:16 PM  Video End Time (time video stopped): 1:18 PM   Originating Location (pt. Location):  [facility]  Distant Location (provider location):  [facility]  Mode of Communication:  Video Conference via [employer]  Patient verbally consented to [employer] televisit.    History:  Patient states a doctor at the clinic told her to come in because magnesium 0.8 and creatinine 2.8 .  Patient feels weak and tired gradually getting worse over last couple of weeks.  No fever, nausea, vomiting or diarrhea.  Kidney transplant in 2004.  Patient denies any pain.    Exam:  General:  Alert, interactive  Cardiovascular:  Well perfused  Lungs:  No respiratory distress, no accessory muscle use  Neuro:  Moving all 4 extremities  Skin:  Warm, dry  Psych:  Normal affect    Patient Vitals for the past 24 hrs:   BP Temp Temp src Pulse Resp SpO2 Height Weight   11/28/23 1306 103/73 98.3  F (36.8  C) Temporal 78 20 97 % 1.549 m (5' 1\") 38.6 kg (85 lb)       Appropriate interventions for symptom management were initiated if applicable.  Appropriate diagnostic tests were initiated if indicated.    Important information for subsequent clinician:  Due to patient's weakness and tiredness she was sent to the emergency department for further evaluation.    I briefly evaluated the patient and developed an initial plan of care. I discussed this plan and explained that this brief interaction does not constitute a full evaluation. Patient/family understands that they should wait to be fully evaluated and discuss any test results with another clinician prior to leaving the hospital.       Beatriz Del Cid MD  11/28/23 1320    "

## 2023-11-28 NOTE — ED TRIAGE NOTES
Sent from clinic for low Mg+ and elevated creat.     Triage Assessment (Adult)       Row Name 11/28/23 1000          Triage Assessment    Airway WDL WDL        Respiratory WDL    Respiratory WDL X        Skin Circulation/Temperature WDL    Skin Circulation/Temperature WDL WDL        Cardiac WDL    Cardiac WDL WDL        Peripheral/Neurovascular WDL    Peripheral Neurovascular WDL WDL        Cognitive/Neuro/Behavioral WDL    Cognitive/Neuro/Behavioral WDL WDL        Zach Coma Scale    Best Motor Response 6-->(M6) obeys commands     Best Verbal Response 5-->(V5) oriented

## 2023-11-28 NOTE — ED PROVIDER NOTES
History     Chief Complaint:  Abnormal Labs (Low mag. )       HPI   Maribel Yang is a 72 year old female with a history of MI, hypertension, congestive heart failure and a renal transplant in 2004 who is here with her daughter for evaluation for fatigue. Patient states that she has been experiencing worsening fatigue the past couple weeks, with findings of a depressed Magnesium level yesterday while in-clinic, resulting in her presentation to the ED. States that she has felt so weak that her  has to regularly assist her off of the toilet. Patient also has some difficulty breathing upon exertion. Patient denies fever. Patient adds that she stopped smoking a couple months ago. Of note, patient is on 2.5 L oxygen at home, which has a cord long enough for her to avoid having to ever remove her tank.     Independent Historian:    Patient only.     Review of External Notes:  I reviewed conversations with [place] Nurse advisors from 11/25/2023. Also reviewed virtual visit with PCP from 11/27/23.     Medications:    Albuterol   Aspirin 81 mg   Lipitor   Plavix   Nexium   Apresoline   Combivent respimat  Imdur   Lactobacillus-insulin  Lopressor   Zofran  Oxycodone  Prednisone   Tacrolimus    Past Medical History:    Osteoporosis   Anemia   ASCUS   Basal cell carcinoma   Congestive heart failure   Depressive disorder   Heart attack   Hypertension  Peripheral artery disease   Squamous cell lung cancer   Thrombosis of leg   Kidney and ureter disorder   COPD    Past Surgical History:    Conization leep   CV coronary angiogram   PCI stent drug eluting  Endovascular abdominal aortic aneurysm repair   Eye surgery  Genitourinary surgery  Irrigation and debridement groin  Laser excise vulva  Laser CO2 vagina  Right femoral pseudoaneurysm repair   Thrombectomy   Bilateral eye surgery  Oophorectomy      Physical Exam   Patient Vitals for the past 24 hrs:   BP Temp Temp src Pulse Resp SpO2 Height Weight   11/28/23 1306  "103/73 98.3  F (36.8  C) Temporal 78 20 97 % 1.549 m (5' 1\") 38.6 kg (85 lb)        Physical Exam  General: Cachectic women with mild increased work of breathing but otherwise appropriate interaction.     Eye:  Pupils are equal, round, and reactive.  Extraocular movements intact.    ENT:  No rhinorrhea.  Moist mucus membranes.  Normal tongue and tonsil.    Cardiac:  Regular rate and rhythm.  No murmurs, gallops, or rubs.    Pulmonary: Markedly diminished breath sounds throughout. Mild increased work of breathing on 3 L nasal canula.     Abdomen:  Positive bowel sounds.  Abdomen is soft and non-distended, without focal tenderness.    Musculoskeletal:  Normal movement of all extremities without evidence for deficit. No lower extremity edema noted.     Skin:  Warm and dry without rashes.    Neurologic:  Non-focal exam without asymmetric weakness or numbness.     Psychiatric:  Normal affect with appropriate interaction with examiner.     Emergency Department Course     Imaging:  Chest XR,  PA & LAT   Final Result   IMPRESSION: Linear and patchy opacities in the left upper lobe and   bilateral lung bases, likely due to pneumonia. Follow-up to   resolution. Stable cardiomegaly. Stable tortuous and aneurysmal   descending thoracic aorta. Abdominal aortic endograft. Small bilateral   pleural effusions are stable. No pneumothorax.      WILLY CRISTOBAL MD            SYSTEM ID:  [id number]        Report per radiology    Laboratory:  Labs Ordered and Resulted from Time of ED Arrival to Time of ED Departure   COMPREHENSIVE METABOLIC PANEL - Abnormal       Result Value    Sodium 136      Potassium 5.1      Carbon Dioxide (CO2) 26      Anion Gap 11      Urea Nitrogen 69.7 (*)     Creatinine 2.78 (*)     GFR Estimate 18 (*)     Calcium 9.3      Chloride 99      Glucose 164 (*)     Alkaline Phosphatase 165 (*)     AST 14      ALT 11      Protein Total 6.3 (*)     Albumin 3.4 (*)     Bilirubin Total 0.2     MAGNESIUM - Abnormal    " Magnesium 1.4 (*)    CBC WITH PLATELETS AND DIFFERENTIAL - Abnormal    WBC Count 10.1      RBC Count 3.21 (*)     Hemoglobin 8.2 (*)     Hematocrit 27.4 (*)     MCV 85      MCH 25.5 (*)     MCHC 29.9 (*)     RDW 17.6 (*)     Platelet Count 248      % Neutrophils 89      % Lymphocytes 3      % Monocytes 6      % Eosinophils 1      % Basophils 0      % Immature Granulocytes 1      NRBCs per 100 WBC 0      Absolute Neutrophils 9.0 (*)     Absolute Lymphocytes 0.3 (*)     Absolute Monocytes 0.6      Absolute Eosinophils 0.1      Absolute Basophils 0.0      Absolute Immature Granulocytes 0.1      Absolute NRBCs 0.0     NT PROBNP INPATIENT - Abnormal    N terminal Pro BNP Inpatient 12,501 (*)    PHOSPHORUS - Normal    Phosphorus 3.9     ROUTINE UA WITH MICROSCOPIC REFLEX TO CULTURE      Emergency Department Course & Assessments:  Interventions:  Medications   magnesium sulfate 2 g in 50 mL sterile water intermittent infusion (0 g Intravenous Stopped 11/28/23 1704)   lactated ringers BOLUS 500 mL (0 mLs Intravenous Stopped 11/28/23 1704)      Independent Interpretation (X-rays, CTs, rhythm strip):  None    Assessments/Consultations/Discussion of Management or Tests:  ED Course as of 11/28/23 1735 Tue Nov 28, 2023   1416 I obtained history and examined the patient as noted above.    1647 I rechecked and updated the patient.      Social Determinants of Health affecting care:  None     Disposition:  The patient was discharged to home.     Impression & Plan      Medical Decision Making:  This medically complicated 72-year-old woman presents to us because of generalized weakness along with advice from her clinic that she has abnormal labs and needs to have a recheck.  The patient is status post kidney transplant 20 years ago and also has known heart failure.  She has severe COPD and is oxygen dependent at 2-1/2 L.  She also has issues with generalized weakness, and while she notes that she has been having increasing weakness  "over the past 2 weeks with some difficulties getting to and from the bathroom and needing assistance to get off the toilet, she states that she continues to \"get along\" and feels safe in her current situation.    On my evaluation, she is cachectic and frail appears older than her stated age.  She is resting in the bed on her side with 3 L by nasal cannula with oxygenation in the high 90s.  When I have her move about, she does seem to have slight increase in work of breathing.  Her lungs are very diminished and she does not have any other signs of significant crackles.  She does not have any lower extremity edema.  She denies increased difficulty breathing when she lays flat, but rather when she is exerting herself.  She denies having any associated chest pain.  Regarding her diet, she states that she is \"eating more over the past few days because of the Thanksgiving holiday.\"  She denies having any significant vomiting or diarrhea.    Vital signs are reviewed and are reassuring.  Blood work is returning somewhat improved from her prior with a magnesium trending upward from 0.8-1.4 and her creatinine slightly improving from 2.85-2.78.  Her BUN continues to be slightly elevated.  She has significant baseline anemia, with her current hemoglobin of 8.2, slightly lower than her values from 4 days ago though still within the realm of normal for her.    Patient her kidney function, she appears slightly dry.  She was given 500 cc bolus of lactated Ringer's.  I also repleted her magnesium.  The rest of her electrolytes are reassuring.  With her slight increased work of breathing, I obtained a BNP.  This value is somewhat elevated compared to prior values from 2 months ago.  However, her chest x-ray does not show any evidence of significant fluid overload and she otherwise shows no signs of fluid overload on her physical exam.  It is unclear the significance of this value.    In the end, I spoke with the patient who was " visited by her daughter and grandson.  I explained that the patient is an exceedingly complicated and is high risk for bad outcomes.  With her transplanted kidney, it would seem that she would benefit from admission to the hospital to better optimize her kidney function.  I also explained that with her shortness of breath that an echocardiogram would be reasonable to see how her heart failure is doing and to consider changing around medications.  The patient is adamant that she does not want to be in the hospital unless it is absolutely necessary.  With her reassuring vital signs, home oxygen with appropriate oxygenation, and likely improvement in her kidney numbers based on our efforts today, I explained to her that I do not feel this is absolutely unreasonable.  The family also supports taking her home, feeling that they have adequate resources to care for her.  She feels improved after fluids.  I do believe that she understands that she is a high risk patient.  Furthermore, she was able to reiterate to me that she should never hesitate to call for transport back to the hospital if she is feeling worse or if she changes her mind about a more thorough workup.  Otherwise, I advised her to be in touch with her primary team and her kidney transplant team, if she should undergo repeat chemistry and kidney function testing in the next 48 hours and to discuss if further workup for this is needed.      Diagnosis:    ICD-10-CM    1. Hypomagnesemia  E83.42       2. Generalized weakness  R53.1       3. Shortness of breath  R06.02          Discharge Medications:  Discharge Medication List as of 11/28/2023  5:05 PM        Scribe Disclosure:  I, Meggan Mulligan, am serving as a scribe at 3:05 PM on 11/28/2023 to document services personally performed by Trierweiler, Chad A, MD based on my observations and the provider's statements to me.    I, CHALINO GARCIA, am serving as a scribe  at 3:08 PM on 11/28/2023 to document  services personally performed by Trierweiler, Chad A, MD based on my observations and the provider's statements to me.    11/28/2023   Trierweiler, Chad A, MD Trierweiler, Chad A, MD  11/28/23 0823

## 2023-11-28 NOTE — DISCHARGE INSTRUCTIONS
As discussed, I have strongly recommended you be admitted to the hospital for further workup of your kidney and cardiac issues along with your generalized weakness.  Instead, you have elected to be discharged home.  It is imperative that you be in touch with your primary team and your kidney transplant team to have your labs rechecked in the next 48 to 72 hours.  Most importantly, we discussed that there should be no hesitation to return to the ER immediately if you change your mind about admission for a more thorough workup including echocardiogram and consultation with nephrology along with physical therapy and determination of safe housing.

## 2023-11-29 NOTE — TELEPHONE ENCOUNTER
Placed referral to care coordination to see if she has any potential resources for Maribel.    PN

## 2023-11-29 NOTE — TELEPHONE ENCOUNTER
PN,  Please see below Darrellhart response.  [facility] community paramedic program ending 12/1/23 - if this is the resource she planned to use for lab draws?  Could see if care coordination has other transportation suggestions if needed  Thanks,  Radha OSBORN RN

## 2023-11-30 NOTE — PROGRESS NOTES
Clinic Care Coordination Contact  [facility]/Voicemail    Clinical Data: Care Coordinator Outreach    Outreach Documentation Number of Outreach Attempt   11/30/2023   2:20 PM 1       Left message on patient's voicemail with call back information and requested return call.    Plan: Care Coordinator will try to reach patient again in 1-2 business days.    Nyasia Main  Community Health Worker  [facility]  [phone]

## 2023-11-30 NOTE — TELEPHONE ENCOUNTER
Recommendation:  Follow with General Nephrologist    Adult Nephrology Referral placed.  Alternate 4-6 months from her Transplant Nephrology visit.       LPN task:  Discussed Dr See's recommendation to also see a General Nephrologist  Referral was placed, expect a call from a .

## 2023-11-30 NOTE — LETTER
[facility] CARE COORDINATION  [address]  [facility] [address]    December 1, 2023    Maribel Yang  [address]  [facility] [address]      Dear Maribel,    I am a clinic community health worker who works with Lisa Martinez DO with the [facility]. I wanted to introduce myself and provide you with my contact information for you to be able to call me with any questions or concerns. Below is a description of clinic care coordination and how I can further assist you.       The clinic care coordination team is made up of a registered nurse, , financial resource worker and community health worker who understand the health care system. The goal of clinic care coordination is to help you manage your health and improve access to the health care system. Our team works alongside your provider to assist you in determining your health and social needs. We can help you obtain health care and community resources, providing you with necessary information and education. We can work with you through any barriers and develop a care plan that helps coordinate and strengthen the communication between you and your care team.  Our services are voluntary and are offered without charge to you personally.    Please feel free to contact me with any questions or concerns regarding care coordination and what we can offer.      We are focused on providing you with the highest-quality healthcare experience possible.      Sincerely,     Nyasia Main  Community Health Worker  [facility]  [phone]

## 2023-11-30 NOTE — LETTER
[facility] CARE COORDINATION  [address]  [facility] [address]    December 1, 2023    Maribel Yang  [address]  [facility] [address]      Dear Maribel,    I am a clinic community health worker who works with Lisa Martinez DO with the [facility]. I wanted to introduce myself and provide you with my contact information for you to be able to call me with any questions or concerns. Below is a description of clinic care coordination and how I can further assist you.       The clinic care coordination team is made up of a registered nurse, , financial resource worker and community health worker who understand the health care system. The goal of clinic care coordination is to help you manage your health and improve access to the health care system. Our team works alongside your provider to assist you in determining your health and social needs. We can help you obtain health care and community resources, providing you with necessary information and education. We can work with you through any barriers and develop a care plan that helps coordinate and strengthen the communication between you and your care team.  Our services are voluntary and are offered without charge to you personally.    Please feel free to contact me with any questions or concerns regarding care coordination and what we can offer.      We are focused on providing you with the highest-quality healthcare experience possible.      Sincerely,     Nyasia Main  Community Health Worker  [facility]  [phone]

## 2023-12-01 NOTE — PROGRESS NOTES
Clinic Care Coordination Contact  [facility]/Voicemail        Chart Review: PCP referral from Dr. Martinez 11/30/23:  Care Transition - ED to outpatient, IP to OP  Complex medical concerns/education - chronic diagnosis, compliance with medical treatment plan  Financial support - medication affordability  Resources for transportation  Additional information - complex patient with many hospitalizations in the last 4 months after getting COVID - she needs blood draws and is homebound - previously used the EMS but no longer able to go out       Clinical Data: Care Coordinator Outreach    Outreach Documentation Number of Outreach Attempt   11/30/2023   2:20 PM 1   12/1/2023  10:38 AM 2       Left message on patient's voicemail with call back information and requested return call.    Plan: Care Coordinator will send care coordination introduction letter with care coordinator contact information and explanation of care coordination services via [employer] and [employer]. Care Coordinator will do no further outreaches at this time.    Nyasia Main  Community Health Worker  [facility]  [phone]

## 2023-12-04 NOTE — PROGRESS NOTES
Clinic Care Coordination Contact  Community Health Worker Initial Outreach      Chart Review: PCP referral from Dr. Martinez 11/30/23:  Care Transition - ED to outpatient, IP to OP  Complex medical concerns/education - chronic diagnosis, compliance with medical treatment plan  Financial support - medication affordability  Resources for transportation  Additional information - complex patient with many hospitalizations in the last 4 months after getting COVID - she needs blood draws and is homebound - previously used the EMS but no longer able to go out      HX: CHW UTC x 2 on 11/30/23 and 12/1/23 at 10:38 am. Pt called CHW on 12/1/23 at 1:00 pm.      CHW Initial Information Gathering:  Referral Source: PCP  Current living arrangement:: I live in a private home with spouse  Type of residence:: Private home - stairs  Community Resources: None  Supplies Currently Used at Home: Compression Stockings, Oxygen Tubing/Supplies (O2 concentrator - 2.5 - 3.0 LNC)  Equipment Currently Used at Home: walker, rolling, shower chair, but pt prefers to take a bath.  Informal Support system:: Spouse, Family (Calls her daughter who lives in [place])  No PCP office visit in Past Year: No  Transportation means:: None (Spouse has a license but they do not have a car currently.)  CHW Additional Questions  If ED/Hospital discharge, follow-up appointment scheduled as recommended?: N/A  Medication changes made following ED/Hospital discharge?: N/A  MyChart active?: Yes  Patient sent Social Determinants of Health questionnaire?: Yes    Patient accepts CC: Yes. Patient scheduled for assessment with ALLIE Loyola RN, on 12/5/23 at 2:00 pm. Patient noted desire to discuss transportation (would be interested in [employer]) and in-home lab draws.     Spoke with pt this afternoon:  Transportation - had to cancel numerous appointments because she has not way to get to appointments. Spouse drives but they have no car currently. Pt is open to [employer].  Lab work -  she did utilized the CP program once for a blood draw. Pt uses a walker and O2 concentrator so walking requires assist of another.  AVS for recent ED encounter on 11/28/23 - schedule visit with Dr. Martinez in 3 days but pt has not way to get to this appointment.    Nyasia Main  Community Health Worker  [facility]  [phone]

## 2023-12-05 NOTE — LETTER
Hello,      Please see referral below for Skilled nursing. Patient has used [facility] in the past and is hopeful you will accept. Please let me know either way if you can take. Patient needs a lab.     Thank you,   CHERRI KrugerN, RN, PHN   Care Coordinator-Ambulatory Care Management  [facility]  [phone]                                                                                Patient Information       Patient Name  Maribel Yang ([id number]) Legal Sex  Female   1950         Patient Demographics       Address  [address] [place] [address] Phone  [phone] (Home) *Preferred*  [phone] (Mobile) E-mail Address  [email or web address]         Basic Information       Date Of Birth  1950 Gender Identity  Female Race  White Ethnic Group  Not  or  Preferred Language  English       PCP and Center    Primary Care Provider  Phone [place]     Lisa Martinez [phone] [address] [address]         Patient Contacts       Name Relation Home Work Mobile        BERNARDO YANG Spouse [phone]           FLORINA YANG Daughter [phone]            Documents on File   [facility] - [employer] MEDICARE  Subscriber:  Maribel Yang  Subscriber ID:[id number]  Relationship:Self  Member:Maribel Yang  Member ID:[id number]  LOB:None  Plan year:  2023 - [place]  Effective dates:  2020 - [place]        Chief Complaint:  Abnormal Labs (Low mag. )        HPI   Maribel Yang is a 72 year old female with a history of MI, hypertension, congestive heart failure and a renal transplant in  who is here with her daughter for evaluation for fatigue. Patient states that she has been experiencing worsening fatigue the past couple weeks, with findings of a depressed Magnesium level yesterday while in-clinic, resulting in her presentation to the ED.  "States that she has felt so weak that her  has to regularly assist her off of the toilet. Patient also has some difficulty breathing upon exertion. Patient denies fever. Patient adds that she stopped smoking a couple months ago. Of note, patient is on 2.5 L oxygen at home, which has a cord long enough for her to avoid having to ever remove her tank.      Independent Historian:    Patient only.      Review of External Notes:  I reviewed conversations with [facility] Nurse advisors from 11/25/2023. Also reviewed virtual visit with PCP from 11/27/23.      Medications:    Albuterol   Aspirin 81 mg   Lipitor   Plavix   Nexium   Apresoline   Combivent respimat  Imdur   Lactobacillus-insulin  Lopressor   Zofran  Oxycodone  Prednisone   Tacrolimus     Past Medical History:    Osteoporosis   Anemia   ASCUS   Basal cell carcinoma   Congestive heart failure   Depressive disorder   Heart attack   Hypertension  Peripheral artery disease   Squamous cell lung cancer   Thrombosis of leg   Kidney and ureter disorder   COPD     Past Surgical History:    Conization leep   CV coronary angiogram   PCI stent drug eluting  Endovascular abdominal aortic aneurysm repair   Eye surgery  Genitourinary surgery  Irrigation and debridement groin  Laser excise vulva  Laser CO2 vagina  Right femoral pseudoaneurysm repair   Thrombectomy   Bilateral eye surgery  Oophorectomy       Physical Exam   Patient Vitals for the past 24 hrs:    BP Temp Temp src Pulse Resp SpO2 Height Weight   11/28/23 1306 103/73 98.3  F (36.8  C) Temporal 78 20 97 % 1.549 m (5' 1\") 38.6 kg (85 lb)         Physical Exam  General: Cachectic women with mild increased work of breathing but otherwise appropriate interaction.      Eye:  Pupils are equal, round, and reactive.  Extraocular movements intact.     ENT:  No rhinorrhea.  Moist mucus membranes.  Normal tongue and tonsil.     Cardiac:  Regular rate and rhythm.  No murmurs, gallops, or rubs.     Pulmonary: Markedly " diminished breath sounds throughout. Mild increased work of breathing on 3 L nasal canula.      Abdomen:  Positive bowel sounds.  Abdomen is soft and non-distended, without focal tenderness.     Musculoskeletal:  Normal movement of all extremities without evidence for deficit. No lower extremity edema noted.      Skin:  Warm and dry without rashes.     Neurologic:  Non-focal exam without asymmetric weakness or numbness.      Psychiatric:  Normal affect with appropriate interaction with examiner.      Emergency Department Course      Imaging:  Chest XR,  PA & LAT   Final Result   IMPRESSION: Linear and patchy opacities in the left upper lobe and   bilateral lung bases, likely due to pneumonia. Follow-up to   resolution. Stable cardiomegaly. Stable tortuous and aneurysmal   descending thoracic aorta. Abdominal aortic endograft. Small bilateral   pleural effusions are stable. No pneumothorax.       WILLY CRISTOBAL MD            SYSTEM ID:  [id number]          Report per radiology     Laboratory:        Labs Ordered and Resulted from Time of ED Arrival to Time of ED Departure   COMPREHENSIVE METABOLIC PANEL - Abnormal       Result Value      Sodium 136        Potassium 5.1        Carbon Dioxide (CO2) 26        Anion Gap 11        Urea Nitrogen 69.7 (*)       Creatinine 2.78 (*)       GFR Estimate 18 (*)       Calcium 9.3        Chloride 99        Glucose 164 (*)       Alkaline Phosphatase 165 (*)       AST 14        ALT 11        Protein Total 6.3 (*)       Albumin 3.4 (*)       Bilirubin Total 0.2      MAGNESIUM - Abnormal     Magnesium 1.4 (*)     CBC WITH PLATELETS AND DIFFERENTIAL - Abnormal     WBC Count 10.1        RBC Count 3.21 (*)       Hemoglobin 8.2 (*)       Hematocrit 27.4 (*)       MCV 85        MCH 25.5 (*)       MCHC 29.9 (*)       RDW 17.6 (*)       Platelet Count 248        % Neutrophils 89        % Lymphocytes 3        % Monocytes 6        % Eosinophils 1        % Basophils 0        % Immature  "Granulocytes 1        NRBCs per 100 WBC 0        Absolute Neutrophils 9.0 (*)       Absolute Lymphocytes 0.3 (*)       Absolute Monocytes 0.6        Absolute Eosinophils 0.1        Absolute Basophils 0.0        Absolute Immature Granulocytes 0.1        Absolute NRBCs 0.0      NT PROBNP INPATIENT - Abnormal     N terminal Pro BNP Inpatient 12,501 (*)     PHOSPHORUS - Normal     Phosphorus 3.9      ROUTINE UA WITH MICROSCOPIC REFLEX TO CULTURE      Emergency Department Course & Assessments:  Interventions:  Medications   magnesium sulfate 2 g in 50 mL sterile water intermittent infusion (0 g Intravenous Stopped 11/28/23 1704)   lactated ringers BOLUS 500 mL (0 mLs Intravenous Stopped 11/28/23 1704)      Independent Interpretation (X-rays, CTs, rhythm strip):  None     Assessments/Consultations/Discussion of Management or Tests:      ED Course as of 11/28/23 1735 Tue Nov 28, 2023   1416 I obtained history and examined the patient as noted above.    1647 I rechecked and updated the patient.       Social Determinants of Health affecting care:  None     Disposition:  The patient was discharged to home.      Impression & Plan       Medical Decision Making:  This medically complicated 72-year-old woman presents to us because of generalized weakness along with advice from her clinic that she has abnormal labs and needs to have a recheck.  The patient is status post kidney transplant 20 years ago and also has known heart failure.  She has severe COPD and is oxygen dependent at 2-1/2 L.  She also has issues with generalized weakness, and while she notes that she has been having increasing weakness over the past 2 weeks with some difficulties getting to and from the bathroom and needing assistance to get off the toilet, she states that she continues to \"get along\" and feels safe in her current situation.     On my evaluation, she is cachectic and frail appears older than her stated age.  She is resting in the bed on her side " "with 3 L by nasal cannula with oxygenation in the high 90s.  When I have her move about, she does seem to have slight increase in work of breathing.  Her lungs are very diminished and she does not have any other signs of significant crackles.  She does not have any lower extremity edema.  She denies increased difficulty breathing when she lays flat, but rather when she is exerting herself.  She denies having any associated chest pain.  Regarding her diet, she states that she is \"eating more over the past few days because of the Thanksgiving holiday.\"  She denies having any significant vomiting or diarrhea.     Vital signs are reviewed and are reassuring.  Blood work is returning somewhat improved from her prior with a magnesium trending upward from 0.8-1.4 and her creatinine slightly improving from 2.85-2.78.  Her BUN continues to be slightly elevated.  She has significant baseline anemia, with her current hemoglobin of 8.2, slightly lower than her values from 4 days ago though still within the realm of normal for her.     Patient her kidney function, she appears slightly dry.  She was given 500 cc bolus of lactated Ringer's.  I also repleted her magnesium.  The rest of her electrolytes are reassuring.  With her slight increased work of breathing, I obtained a BNP.  This value is somewhat elevated compared to prior values from 2 months ago.  However, her chest x-ray does not show any evidence of significant fluid overload and she otherwise shows no signs of fluid overload on her physical exam.  It is unclear the significance of this value.     In the end, I spoke with the patient who was visited by her daughter and grandson.  I explained that the patient is an exceedingly complicated and is high risk for bad outcomes.  With her transplanted kidney, it would seem that she would benefit from admission to the hospital to better optimize her kidney function.  I also explained that with her shortness of breath that an " echocardiogram would be reasonable to see how her heart failure is doing and to consider changing around medications.  The patient is adamant that she does not want to be in the hospital unless it is absolutely necessary.  With her reassuring vital signs, home oxygen with appropriate oxygenation, and likely improvement in her kidney numbers based on our efforts today, I explained to her that I do not feel this is absolutely unreasonable.  The family also supports taking her home, feeling that they have adequate resources to care for her.  She feels improved after fluids.  I do believe that she understands that she is a high risk patient.  Furthermore, she was able to reiterate to me that she should never hesitate to call for transport back to the hospital if she is feeling worse or if she changes her mind about a more thorough workup.  Otherwise, I advised her to be in touch with her primary team and her kidney transplant team, if she should undergo repeat chemistry and kidney function testing in the next 48 hours and to discuss if further workup for this is needed.        Diagnosis:      ICD-10-CM     1. Hypomagnesemia  E83.42         2. Generalized weakness  R53.1         3. Shortness of breath  R06.02            Discharge Medications:  Discharge Medication List as of 11/28/2023  5:05 PM         Scribe Disclosure:  I, Meggan Mulligan, am serving as a scribe at 3:05 PM on 11/28/2023 to document services personally performed by Trierweiler, Chad A, MD based on my observations and the provider's statements to me.     I, CHALINO GARCIA, am serving as a scribe  at 3:08 PM on 11/28/2023 to document services personally performed by Trierweiler, Chad A, MD based on my observations and the provider's statements to me.    11/28/2023   Trierweiler, Chad A, MD

## 2023-12-05 NOTE — PROGRESS NOTES
Clinic Care Coordination Contact  Clinic Care Coordination Contact  OUTREACH    Referral Information:  Referral Source: PCP         Chief Complaint   Patient presents with    Clinic Care Coordination - Initial        Universal Utilization: 96.9 % Risk of Admission or ED Visit.       Clinic Utilization  Difficulty keeping appointments:: No  Compliance Concerns: No  No PCP office visit in Past Year: No  Utilization      No Show Count (past year)  2             ED Visits  6             Hospital Admissions  5                    Current as of: 12/5/2023  2:09 AM                Clinical Concerns:  Current Medical Concerns:  Recent ER visit for abnormal labs. Needs follow up labs, Referral made to  [facility].  Messaged PMD to request a home care order. Patient unable to get out of the home easily due to requiring assistance to leave the home, on oxygen, fatigue.   Current Behavioral Concerns: none    Education Provided to patient: Educated on home care, Care Coordination-enrolled, [employer] mobility and the need to make a virtual MD appointment to have the paper work filled out.  Pain  Pain (GOAL)::  (n/a)  Health Maintenance Reviewed: Due/Overdue   Health Maintenance Due   Topic Date Due    HF ACTION PLAN  Never done    COPD ACTION PLAN  Never done    ZOSTER IMMUNIZATION (1 of 2) Never done    MICROALBUMIN  08/24/2006    RSV VACCINE (Pregnancy & 60+) (1 - 1-dose 60+ series) Never done    MAMMO SCREENING  10/19/2022    MEDICARE ANNUAL WELLNESS VISIT  05/03/2023    COVID-19 Vaccine (6 - 2023-24 season) 09/01/2023           Medication Management:  Medication review status: Medications reviewed and no changes reported per patient.          Current Outpatient Medications:     acetaminophen (TYLENOL) 325 MG tablet, Take 2 tablets (650 mg) by mouth every 4 hours as needed for other (For optimal non-opioid multimodal pain management to improve pain control.), Disp: 100 tablet, Rfl: 3    albuterol (PROAIR HFA/PROVENTIL  HFA/VENTOLIN HFA) 108 (90 Base) MCG/ACT inhaler, Inhale 1-2 puffs into the lungs every 6 hours as needed for shortness of breath, wheezing or cough, Disp: 18 g, Rfl: 2    aspirin (ASPIRIN LOW DOSE) 81 MG chewable tablet, CHEW AND SWALLOW 1 TABLET (81 MG) BY MOUTH DAILY, Disp: 90 tablet, Rfl: 3    atorvastatin (LIPITOR) 80 MG tablet, Take 1 tablet (80 mg) by mouth daily, Disp: 90 tablet, Rfl: 3    calcium carbonate-vitamin D (CALTRATE) 600-10 MG-MCG per tablet, TAKE ONE TABLET BY MOUTH TWICE A DAY, Disp: 120 tablet, Rfl: 0    clopidogrel (PLAVIX) 75 MG tablet, Take 1 tablet (75 mg) by mouth daily, Disp: 90 tablet, Rfl: 3    esomeprazole (NEXIUM) 40 MG DR capsule, Take 1 capsule (40 mg) by mouth every morning (before breakfast) Take 30-60 minutes before eating., Disp: 90 capsule, Rfl: 3    hydrALAZINE (APRESOLINE) 100 MG tablet, Take 1 tablet (100 mg) by mouth 3 times daily, Disp: 90 tablet, Rfl: 5    ipratropium-albuterol (COMBIVENT RESPIMAT)  MCG/ACT inhaler, Inhale 1 puff into the lungs 4 times daily, Disp: 4 g, Rfl: 4    isosorbide mononitrate (IMDUR) 60 MG 24 hr tablet, Take 1 tablet (60 mg) by mouth daily, Disp: 90 tablet, Rfl: 2    Lactobacillus-Inulin ([facility] DIGESTIVE [facility]) CAPS, TAKE ONE CAPSULE BY MOUTH ONCE DAILY (DUE FOR PHYSICAL IN MARCH), Disp: 90 capsule, Rfl: 3    metoprolol tartrate (LOPRESSOR) 100 MG tablet, Take 1 tablet (100 mg) by mouth daily, Disp: 90 tablet, Rfl: 3    ondansetron (ZOFRAN ODT) 4 MG ODT tab, Take 1 tablet (4 mg) by mouth every 8 hours as needed for nausea, Disp: 30 tablet, Rfl: 1    oxyCODONE (ROXICODONE) 5 MG tablet, Take 1 tablet (5 mg) by mouth every 4 hours as needed for severe pain, Disp: 12 tablet, Rfl: 0    predniSONE (DELTASONE) 5 MG tablet, Take 1 tablet (5 mg) by mouth daily, Disp: 90 tablet, Rfl: 3    psyllium (METAMUCIL/KONSYL) 58.6 % powder, Take by mouth every morning, Disp: , Rfl:     tacrolimus (GENERIC EQUIVALENT) 0.5 MG capsule, Take 4 capsules (2  mg) by mouth 2 times daily, Disp: 240 capsule, Rfl: 11     Allergies   Allergen Reactions    Blood Transfusion Related (Informational Only) Other (See Comments)     Patient has a history of a clinically significant antibody against RBC antigens.  A delay in compatible RBCs may occur.    Tramadol-Acetaminophen Nausea and Vomiting and Hives    Hydrocodone Nausea and Vomiting and Hives          Functional Status:  Dependent ADLs:: Ambulation-walker, Grooming, Transfers  Dependent IADLs:: Laundry, Shopping, Meal Preparation, Cleaning  Bed or wheelchair confined:: No  Mobility Status: Independent w/Device  Fallen 2 or more times in the past year?: No  Any fall with injury in the past year?: No    Living Situation:  Current living arrangement:: I live in a private home with spouse  [facility]    Lifestyle & Psychosocial Needs:    Social Determinants of Health     Food Insecurity: Not on file   Depression: Not at risk (7/19/2023)    PHQ-2     PHQ-2 Score: 0   Housing Stability: Not on file   Tobacco Use: Medium Risk (11/27/2023)    Patient History     Smoking Tobacco Use: Former     Smokeless Tobacco Use: Never     Passive Exposure: Not on file   Financial Resource Strain: Not on file   Alcohol Use: Not At Risk (5/6/2019)    AUDIT-C     Frequency of Alcohol Consumption: Monthly or less     Average Number of Drinks: 1 or 2     Frequency of Binge Drinking: Less than monthly   Transportation Needs: Not on file   Physical Activity: Not on file   Interpersonal Safety: Not on file   Stress: Not on file   Social Connections: Not on file     Inadequate nutrition (GOAL):: No  Tube Feeding: No  Inadequate activity/exercise (GOAL):: No  Significant changes in sleep pattern (GOAL): No  Transportation means:: None (Spouse has a license but they do not have a car currently.)     Jewish or spiritual beliefs that impact treatment:: No  Mental health DX:: No  Mental health management concern (GOAL)::  No  Chemical Dependency Status: No Current Concerns  Informal Support system:: Spouse, Family (Calls her daughter who lives in [place])           Resources and Interventions:  Current Resources:      Community Resources: None  Supplies Currently Used at Home: Compression Stockings, Oxygen Tubing/Supplies (O2 concentrator - 2.5 - 3.0 LNC)  Equipment Currently Used at Home: walker, rolling, shower chair  Employment Status: retired         Advance Care Plan/Directive  Advanced Care Plans/Directives on file:: No    Referrals Placed: Home Care  Care Plan:  Care Plan: Transportation       Problem: Lack of transportation       Goal: Establish reliable transportation       Start Date: 12/5/2023    This Visit's Progress: 20%    Priority: Medium    Note:     Barriers: Limited mobility; Oxygen; diagnoses of multiple, chronic, complex medical conditions    Strengths: Motivated; agreeable to Care Coordination    Patient expressed understanding of goal: Yes    Action steps to achieve this goal:  1. I will make a follow up appointment with my MD to get a metro-mobility application completed.     2. I will contact Care Coordinator for additional resources if resources provided do not work for my lifestyle/situation.     3. I will contact my care team with questions, concerns or support needs. I will use the clinic as a resource and I understand I can contact my clinic with 24/7 after hours services available. Care Coordinator will remain available as needed.                             Care Plan: Help At Home       Problem: Insufficient In-home support                     Patient/Caregiver understanding: Patient/caregiver verbalized understanding and denies any additional questions or concerns at this time. RNCC engaged in [employer] communications during encounter.       Outreach Frequency: monthly, more frequently as needed  Future Appointments                In 1 month Carson Russ MD [facility]  Rectal Surgery Clinic [facility]    In 1 month [facility] CT Clinic [facility]    In 2 months Nii Mckeon MD [facility]    In 3 months [facility] LAB DRAW [facility]    In 3 months Angelica Ellison PA-C [facility]            Plan: Patient/caregiver will call RNCC with questions, concerns, support needs. RNCC will be available as needed.Next out reach 1 month sooner PRN.     CHRERI KrugerN, RN, PHN   Care Coordinator-Ambulatory Care Management  [facility]  [phone]

## 2023-12-05 NOTE — LETTER
[facility] CARE COORDINATION  [address]  [place] [address]   December 5, 2023    Maribel Yang  [address]  [place] [address]      Dear Maribel,    I am a clinic care coordinator who works with Lisa Martinez DO with the [facility]. I wanted to introduce myself and provide you with my contact information for you to be able to call me with any questions or concerns. Below is a description of clinic care coordination and how I can further assist you.       The clinic care coordination team is made up of a registered nurse, , financial resource worker and community health worker who understand the health care system. The goal of clinic care coordination is to help you manage your health and improve access to the health care system. Our team works alongside your provider to assist you in determining your health and social needs. We can help you obtain health care and community resources, providing you with necessary information and education. We can work with you through any barriers and develop a care plan that helps coordinate and strengthen the communication between you and your care team.  Our services are voluntary and are offered without charge to you personally.    Please feel free to contact me with any questions or concerns regarding care coordination and what we can offer.      We are focused on providing you with the highest-quality healthcare experience possible.    Sincerely,     CHERRI KrugerN, RN, PHN   Care Coordinator-Ambulatory Care Management  [facility]  [phone]

## 2023-12-05 NOTE — LETTER
[facility]  Patient Centered Plan of Care  About Me:        Patient Name:  Maribel Yang    YOB: 1950  Age:         72 year old   James MRN:    [id number] Telephone Information:  Home Phone [phone]   Mobile [phone]       Address:  [address]  [facility] [address] Email address:  [email or web address]      Emergency Contact(s)    Name Relationship Lgl Grd Work Phone Home Phone Mobile Phone   1. BERNARDO YANG Spouse   [phone]    2. PRETTY YANG* Daughter   [phone]           Primary language:  English     needed? No   [employer]:  [phone] op. 1  Other communication barriers:None    Preferred Method of Communication:  Tuan  Current living arrangement: I live in a private home with spouse    Mobility Status/ Medical Equipment: Independent w/Device        Health Maintenance  Health Maintenance Reviewed: Due/Overdue   Health Maintenance Due   Topic Date Due    HF ACTION PLAN  Never done    COPD ACTION PLAN  Never done    ZOSTER IMMUNIZATION (1 of 2) Never done    MICROALBUMIN  08/24/2006    RSV VACCINE (Pregnancy & 60+) (1 - 1-dose 60+ series) Never done    MAMMO SCREENING  10/19/2022    MEDICARE ANNUAL WELLNESS VISIT  05/03/2023    COVID-19 Vaccine (6 - 2023-24 season) 09/01/2023           My Access Plan  Medical Emergency 911   Primary Clinic Line [facility] [phone]   24 Hour Appointment Line [phone] or  [phone]) (toll-free)   24 Hour Nurse Line [phone] (toll-free)   Preferred Urgent Care [facility], [phone]     Preferred Hospital [facility]  [phone]     Preferred Pharmacy [facility]     Behavioral Health Crisis Line The National Suicide Prevention Lifeline at 1-648.486.5848 or Text/Call 958           My Care Team Members  Patient Care Team         Relationship  Specialty Notifications Start End    Lisa Martinez DO PCP - General [facility]  4/10/13     Referred to General Surgery     Phone: [phone] Fax: [phone]         [address]  [address] [facility] [address]    Hitesh See MD MD Nephrology  1/3/13     Phone: [phone] Fax: [phone]         [address] [facility] 353 [facility] [address]    Nyasia Gaines MD Referring Physician OB/Gyn  6/19/13     Phone: [phone] Fax: [phone]         [address] [place] [address] [facility] [address]    Joel Davis MD MD Family Practice  7/1/15     Phone: [phone] Fax: [phone]         [address] [facility] [address]    Rosalie Pelletier PA-C Physician Assistant Physician Assistant  7/22/16     Phone: [phone] Fax: [phone]         [address] [facility] 98 [facility] [address]    Lisa Martinez DO Assigned PCP   3/5/17     Phone: [phone] Fax: [phone]         [facility]  [address] [facility] [address]    Liliana Renteria MD MD Oncology  5/17/17     Phone: [phone] Fax: [phone]         [address] [place] [address]    Leelee Evans MD MD INTERNAL MEDICINE - ENDOCRINOLOGY, DIABETES & METABOLISM  4/22/19     Phone: [phone] Fax: [phone]         [address] [place] [address]    Juan Rene MD MD Medical Oncology  2/21/20     Phone: [phone] Fax: [phone]         420 [facility] [address] [facility] [address]    Marquise Wilkerson MD MD Cardiovascular Disease  6/21/21     Phone: [phone] Fax: [phone]         [address] [place] [address]    Marquise Wilkerson MD MD Cardiovascular Disease  6/21/21     Phone: [phone] Fax: [phone]         [address] [place] [address]    Jessica Bunn APRN CNP Referring Physician Vascular Surgery  6/21/21     referred to heart    Phone: [phone] Fax: [phone] [address]  [place]  [place] [address]    Carolee Gar, RN Specialty Care Coordinator Cardiology Admissions 6/29/21     Phone: [phone]         Wick, Marguerite Anju, APRN CNP Nurse Practitioner Cardiovascular Disease  6/29/21     Phone: [phone] Fax: [phone]         [address] [address]    Carolee Gar RN Specialty Care Coordinator Cardiology Admissions 8/30/21     Phone: [phone]         Wick, Marguerite Anju, APRN CNP Nurse Practitioner Cardiovascular Disease  8/30/21     Phone: [phone] Fax: [phone]         [address] [address]    Marguerite Muñoz APRN CNP Assigned Heart and Vascular Provider   9/5/21     Phone: [phone] Fax: [phone]         [address] [place] [address]    Thomas Schroeder MD MD Surgery  12/16/21     Phone: [phone] Fax: [phone]         [address] [place] [address]    Lisseth Heath RN Registered Nurse Nurse  12/7/22     Ivis You APRN CNP Nurse Practitioner Nurse Practitioner  2/6/23     Phone: [phone] Fax: [phone]         [address] [place], [place] [address]    Nii Mckeon MD MD Critical Care  4/7/23     Phone: [phone] Fax: [phone]         [address] [place] [address]    Angelica Ellison PA-C Assigned Cancer Care Provider   9/2/23     Phone: [phone] Fax: [phone]         [address] [place] [address]    Nii Mckeon MD Assigned Pulmonology Provider   9/2/23     Phone: [phone] Fax: [phone]         [address] [place] [address]    Vy Leung APRN CNP Assigned Surgical Provider   9/16/23     Phone: [phone] Fax: [phone]         [address] [place] [address]    Sydnie Petit PA-C Physician Assistant Gastroenterology  10/3/23     Phone: [phone] Fax: [phone]         [facility] [address]    Angeles Calix, RN Lead Care Coordinator  Admissions 12/4/23                 My  Care Plans  Self Management and Treatment Plan    Care Plan  Care Plan: Transportation       Problem: Lack of transportation       Goal: Establish reliable transportation       Start Date: 12/5/2023    This Visit's Progress: 20%    Priority: Medium    Note:     Barriers: Limited mobility; Oxygen; diagnoses of multiple, chronic, complex medical conditions    Strengths: Motivated; agreeable to Care Coordination    Patient expressed understanding of goal: Yes    Action steps to achieve this goal:  1. I will make a follow up appointment with my MD to get a metro-mobility application completed.     2. I will contact Care Coordinator for additional resources if resources provided do not work for my lifestyle/situation.     3. I will contact my care team with questions, concerns or support needs. I will use the clinic as a resource and I understand I can contact my clinic with 24/7 after hours services available. Care Coordinator will remain available as needed.                             Care Plan: Help At Home       Problem: Insufficient In-home support                     Action Plans on File:                       Advance Care Plans/Directives:   Advanced Care Plan/Directives on file:   No    Discussed with patient/caregiver(s): No data recorded           My Medical and Care Information  Problem List   Patient Active Problem List   Diagnosis    Other specified congenital anomalies    Kidney replaced by transplant    S/P LEEP of cervix    CARDIOVASCULAR SCREENING; LDL GOAL LESS THAN 100    Immunosuppression (H24)    HTN, kidney transplant related    History of basal cell carcinoma    YUNIOR III (vulvar intraepithelial neoplasia III)    Peripheral artery disease (H24)    Squamous cell lung cancer (H)    Lumbago    Vaginal dysplasia    Alopecia    Cherry angioma    Skin cancer screening    Intertrigo    History of basal cell carcinoma    Malignant neoplasm of anal canal (H)    Aftercare following organ transplant     Age-related osteoporosis without current pathological fracture    Acute deep vein thrombosis (DVT) of proximal vein of lower extremity, unspecified laterality (H)    Stage 3b chronic kidney disease (H)    Abdominal aortic aneurysm (AAA) without rupture (H24)    Hematoma    Physical deconditioning    Congestive heart failure, unspecified HF chronicity, unspecified heart failure type (H)    Coronary artery disease involving native coronary artery with angina pectoris with documented spasm (H24)    Mild protein-calorie malnutrition (H24)    Anemia, iron deficiency    Pseudoaneurysm of femoral artery (H24)    Infection due to 2019 novel coronavirus    Dehydration    SOB (shortness of breath)    COPD exacerbation (H)    Acute on chronic renal insufficiency    Nausea and vomiting, unspecified vomiting type    COVID-19 virus infection    Shortness of breath    Hypomagnesemia    Weakness    Abdominal pain, epigastric    AMY (acute kidney injury) (H24)    Abdominal pain    Abdominal pain, unspecified abdominal location      Current Medications and Allergies:  See printed Medication Report.    Care Coordination Start Date: 12/4/2023   Frequency of Care Coordination: monthly, more frequently as needed     Form Last Updated: 12/05/2023

## 2023-12-05 NOTE — TELEPHONE ENCOUNTER
----- Message from Angeles Calix RN sent at 12/5/2023  2:29 PM CST -----  Regarding: Home care order needed  Can you please place a home care order for Skilled nursing for Maribel?    Thank you,   CHERRI KrugreN, RN, PHN   Care Coordinator-Ambulatory Care Management  [facility]  [phone]

## 2023-12-11 NOTE — TELEPHONE ENCOUNTER
ISSUE #1: Tacrolimus = 3.3 , 14.5 hour trough  Goal 4-6  Current dose 2 mg BID    Recommendation:  Stay on the same dose.  Recheck level in 1-2 weeks and make sure it is a good trough to avoid additional lab draws.      ISSUE # 2:  Creatinine 2.94    PLAN:  Check for recent illness.  Any fever?  Any missed medication?  Changes in medication, (shaheed diuretics)?  Any pain over the transplanted kidney?  Any nausea / vomiting / diarrhea?  Dehydrated?   Is BP low?   Any dizziness or light headedness when standing or walking?  If dehydrated, and unable to hydrate orally, can schedule IV fluids and repeat BMP after.  Recommend improving hydration and recheck BMP in 1 - 2 weeks.

## 2023-12-12 NOTE — TELEPHONE ENCOUNTER
Call placed to patient. No answer. Detailed voice message left with instructions listed below.     ISSUE #1: Tacrolimus = 3.3 , 14.5 hour trough  Goal 4-6  Current dose 2 mg BID     Recommendation:  Stay on the same dose.  Recheck level in 1-2 weeks and make sure it is a good trough to avoid additional lab draws.        ISSUE # 2:  Creatinine 2.94     PLAN:  Check for recent illness.  Any fever?  Any missed medication?  Changes in medication, (shaheed diuretics)?  Any pain over the transplanted kidney?  Any nausea / vomiting / diarrhea?  Dehydrated?   Is BP low?   Any dizziness or light headedness when standing or walking?  If dehydrated, and unable to hydrate orally, can schedule IV fluids and repeat BMP after.  Recommend improving hydration and recheck BMP in 1 - 2 weeks.

## 2023-12-14 NOTE — TELEPHONE ENCOUNTER
Left message for patient to call [facility] back  When patient calls back please transfer to an RN    How many home oxygen tanks does patient currently receive?  How long do they typically last?  See oxygen order from 09/23/23  Unsure if [facility] is able to increase tank amount   Or if new order needs to be placed?    Radha OSBORN, RN

## 2023-12-14 NOTE — TELEPHONE ENCOUNTER
PN,   Please advise if other inhaler recommended that may be cheaper  See below      Left voicemail for patient to call back regarding mychart message  Patient returned call    Patient stated she does not have combivent respimat inhaler  Is >$100 after insurance  Spoke with insurance   No alternatives provided by insurance  Pt increased oxygen from 2.5LPM to 3LPM to compensate for not having this inhaler  Does still have and use albuterol       States she currently uses a home oxygen concentrator  The battery lasted 1 hr while out of the home  She currently uses 3LPM  Needs this issue fixed asap   Needs to schedule labs and follow up with cardiology/transplant team  Called [facility]  [phone]   On hold > 10 min  Left voicemail for return call   Attempted to call back  Spoke with representative  Will have oxygen team call patient to review options  Based on her current 3LPM setting, battery will typically only last 1-2 hours  Patient just needs to plug machine in when she arrives for her appointment  There are other at home oxygen tank/concentrator options that the team will also review with the patient      Thanks,  Radha OSBORN RN

## 2023-12-15 NOTE — TELEPHONE ENCOUNTER
The [employer] message sent to patient regarding:  Tacrolimus = 3.3 , 14.5 hour trough  Goal 4-6  Current dose 2 mg BID     Recommendation:  Stay on the same dose.  Recheck level in 1-2 weeks and make sure it is a good trough to avoid additional lab draws.        ISSUE # 2:  Creatinine 2.94     PLAN:  Check for recent illness.  Any fever?  Any missed medication?  Changes in medication, (shaheed diuretics)?  Any pain over the transplanted kidney?  Any nausea / vomiting / diarrhea?  Dehydrated?   Is BP low?   Any dizziness or light headedness when standing or walking?  If dehydrated, and unable to hydrate orally, can schedule IV fluids and repeat BMP after.  Recommend improving hydration and recheck BMP in 1 - 2 weeks.

## 2023-12-18 NOTE — TELEPHONE ENCOUNTER
----- Message -----   From: Maribel Yang   Sent: 12/15/2023   4:45 PM CST   To: Post K/P Transplant Lpn/Ma   Subject: Torsemide                                        My feet and legs are really swollen. I need to do something about this. I'm miserable. Can I  take some torsemide?       Per Epic documentation, Torsemide was discontinued last 10/1/23 d/t Creatinine increase (2.3).    Previous dose:  Take 1 tablet (10 mg) by mouth daily Take an additional 10 MG every other day as needed. Additional use as directed by [facility].      OUTCOME:  Left detailed message.  Requested update.  Sent Receept message as well.

## 2023-12-18 NOTE — TELEPHONE ENCOUNTER
Maribel Yang  You1 hour ago (1:08 PM)   Since I keep getting bounced back and forth between clinics and don't get a definitive answer I'm going to use my own judgement.  Maribel Yang4 hours ago (10:04 AM)     KRYSTAL López,     I left you a voice message as well.  I believe the [facility] clinic managing the Torsemide.  Please reach out to them and update them.  Keep me updated so I know if you were able to get a hold of them and what the plan is.     [facility]        OUTCOME:  Called Maribel Yang. No answer. Left VM.    Staff message sent to Carolee Gar RN.

## 2023-12-27 NOTE — PROGRESS NOTES
Clinic Care Coordination Contact  [facility]/Voicemail    Clinical Data: Care Coordinator Outreach    Outreach Documentation Number of Outreach Attempt   11/30/2023   2:20 PM 1   12/1/2023  10:38 AM 2   12/15/2023   9:32 AM 1   12/27/2023  10:46 AM 2       Left message on patient's voicemail with call back information and requested return call.    Plan: Care Coordinator will send unable to contact letter with care coordinator contact information via [employer]. Care Coordinator will do no further outreaches at this time.    CHERRI KrugerN, RN, PHN   Care Coordinator-Ambulatory Care Management  [facility]  [phone]

## 2023-12-27 NOTE — LETTER
[facility] CARE COORDINATION  [address]  [address]  [facility] [address]    December 27, 2023    Maribel Ynag  [address]  [facility] [address]      Dear Maribel,    I have been attempting to reach you since our last contact. I would like to continue to work with you and provide any additional support you may need on achieving your health care related goals. I would appreciate if you would give me a call at [phone] to let me know if you would like to continue working together. I know that there are many things that can affect our ability to communicate and I hope we can continue to work together.    All of us at the [facility] are invested in your health and are here to assist you in meeting your goals.     Sincerely,    CHERRI KrugerN, RN, PHN   Care Coordinator-Ambulatory Care Management  [facility]  [phone]

## 2024-01-01 ENCOUNTER — LAB (OUTPATIENT)
Dept: LAB | Facility: CLINIC | Age: 74
End: 2024-01-01
Payer: COMMERCIAL

## 2024-01-01 ENCOUNTER — TELEPHONE (OUTPATIENT)
Dept: TRANSPLANT | Facility: CLINIC | Age: 74
End: 2024-01-01
Payer: COMMERCIAL

## 2024-01-01 ENCOUNTER — HOSPITAL ENCOUNTER (INPATIENT)
Facility: CLINIC | Age: 74
LOS: 1 days | DRG: 299 | End: 2024-02-03
Attending: EMERGENCY MEDICINE | Admitting: STUDENT IN AN ORGANIZED HEALTH CARE EDUCATION/TRAINING PROGRAM
Payer: COMMERCIAL

## 2024-01-01 ENCOUNTER — APPOINTMENT (OUTPATIENT)
Dept: CT IMAGING | Facility: CLINIC | Age: 74
DRG: 299 | End: 2024-01-01
Attending: EMERGENCY MEDICINE
Payer: COMMERCIAL

## 2024-01-01 ENCOUNTER — TELEPHONE (OUTPATIENT)
Dept: CARDIOLOGY | Facility: CLINIC | Age: 74
End: 2024-01-01
Payer: COMMERCIAL

## 2024-01-01 ENCOUNTER — TELEPHONE (OUTPATIENT)
Dept: SURGERY | Facility: CLINIC | Age: 74
End: 2024-01-01
Payer: COMMERCIAL

## 2024-01-01 ENCOUNTER — TELEPHONE (OUTPATIENT)
Dept: PULMONOLOGY | Facility: CLINIC | Age: 74
End: 2024-01-01

## 2024-01-01 ENCOUNTER — PATIENT OUTREACH (OUTPATIENT)
Dept: CARE COORDINATION | Facility: CLINIC | Age: 74
End: 2024-01-01
Payer: COMMERCIAL

## 2024-01-01 ENCOUNTER — HOSPITAL ENCOUNTER (OUTPATIENT)
Facility: CLINIC | Age: 74
End: 2024-01-01
Admitting: INTERNAL MEDICINE
Payer: COMMERCIAL

## 2024-01-01 ENCOUNTER — OFFICE VISIT (OUTPATIENT)
Dept: CARDIOLOGY | Facility: CLINIC | Age: 74
End: 2024-01-01
Attending: CASE MANAGER/CARE COORDINATOR
Payer: COMMERCIAL

## 2024-01-01 ENCOUNTER — ANCILLARY PROCEDURE (OUTPATIENT)
Dept: CT IMAGING | Facility: CLINIC | Age: 74
End: 2024-01-01
Attending: INTERNAL MEDICINE
Payer: COMMERCIAL

## 2024-01-01 ENCOUNTER — MYC MEDICAL ADVICE (OUTPATIENT)
Dept: INTERVENTIONAL RADIOLOGY/VASCULAR | Facility: CLINIC | Age: 74
End: 2024-01-01
Payer: COMMERCIAL

## 2024-01-01 ENCOUNTER — TELEPHONE (OUTPATIENT)
Dept: OTHER | Facility: CLINIC | Age: 74
End: 2024-01-01

## 2024-01-01 ENCOUNTER — MYC MEDICAL ADVICE (OUTPATIENT)
Dept: PULMONOLOGY | Facility: CLINIC | Age: 74
End: 2024-01-01

## 2024-01-01 ENCOUNTER — TELEPHONE (OUTPATIENT)
Dept: FAMILY MEDICINE | Facility: CLINIC | Age: 74
End: 2024-01-01
Payer: COMMERCIAL

## 2024-01-01 ENCOUNTER — CARE COORDINATION (OUTPATIENT)
Dept: PULMONOLOGY | Facility: CLINIC | Age: 74
End: 2024-01-01

## 2024-01-01 VITALS
OXYGEN SATURATION: 100 % | DIASTOLIC BLOOD PRESSURE: 111 MMHG | HEART RATE: 79 BPM | WEIGHT: 87 LBS | SYSTOLIC BLOOD PRESSURE: 161 MMHG | BODY MASS INDEX: 16.44 KG/M2

## 2024-01-01 VITALS
TEMPERATURE: 97.8 F | DIASTOLIC BLOOD PRESSURE: 45 MMHG | RESPIRATION RATE: 22 BRPM | SYSTOLIC BLOOD PRESSURE: 63 MMHG | HEART RATE: 82 BPM | OXYGEN SATURATION: 100 %

## 2024-01-01 DIAGNOSIS — T81.49XA WOUND INFECTION AFTER SURGERY: ICD-10-CM

## 2024-01-01 DIAGNOSIS — R79.89 ELEVATED SERUM CREATININE: ICD-10-CM

## 2024-01-01 DIAGNOSIS — I50.9 CONGESTIVE HEART FAILURE, UNSPECIFIED HF CHRONICITY, UNSPECIFIED HEART FAILURE TYPE (H): ICD-10-CM

## 2024-01-01 DIAGNOSIS — I71.40 ABDOMINAL AORTIC ANEURYSM (AAA) WITHOUT RUPTURE, UNSPECIFIED PART (H): Primary | ICD-10-CM

## 2024-01-01 DIAGNOSIS — M81.0 AGE-RELATED OSTEOPOROSIS WITHOUT CURRENT PATHOLOGICAL FRACTURE: ICD-10-CM

## 2024-01-01 DIAGNOSIS — Z79.899 IMMUNOSUPPRESSIVE MANAGEMENT ENCOUNTER FOLLOWING KIDNEY TRANSPLANT: ICD-10-CM

## 2024-01-01 DIAGNOSIS — I10 BENIGN ESSENTIAL HYPERTENSION: Primary | ICD-10-CM

## 2024-01-01 DIAGNOSIS — Z48.298 AFTERCARE FOLLOWING ORGAN TRANSPLANT: ICD-10-CM

## 2024-01-01 DIAGNOSIS — I25.10 CORONARY ARTERY DISEASE INVOLVING NATIVE CORONARY ARTERY OF NATIVE HEART WITHOUT ANGINA PECTORIS: ICD-10-CM

## 2024-01-01 DIAGNOSIS — Z94.0 IMMUNOSUPPRESSIVE MANAGEMENT ENCOUNTER FOLLOWING KIDNEY TRANSPLANT: ICD-10-CM

## 2024-01-01 DIAGNOSIS — E78.5 DYSLIPIDEMIA: ICD-10-CM

## 2024-01-01 DIAGNOSIS — I71.40 ABDOMINAL AORTIC ANEURYSM (AAA) WITHOUT RUPTURE (H): ICD-10-CM

## 2024-01-01 DIAGNOSIS — I21.11 ST ELEVATION MYOCARDIAL INFARCTION INVOLVING RIGHT CORONARY ARTERY (H): ICD-10-CM

## 2024-01-01 DIAGNOSIS — Z94.0 KIDNEY REPLACED BY TRANSPLANT: ICD-10-CM

## 2024-01-01 DIAGNOSIS — I50.9 CONGESTIVE HEART FAILURE, UNSPECIFIED HF CHRONICITY, UNSPECIFIED HEART FAILURE TYPE (H): Primary | ICD-10-CM

## 2024-01-01 DIAGNOSIS — Z94.0 KIDNEY TRANSPLANTED: ICD-10-CM

## 2024-01-01 DIAGNOSIS — I20.1 CORONARY ARTERY VASOSPASM (H): ICD-10-CM

## 2024-01-01 DIAGNOSIS — I71.13 RUPTURED ANEURYSM OF DESCENDING THORACIC AORTA (H): ICD-10-CM

## 2024-01-01 DIAGNOSIS — E83.42 HYPOMAGNESEMIA: Primary | ICD-10-CM

## 2024-01-01 DIAGNOSIS — R91.8 PULMONARY NODULES: ICD-10-CM

## 2024-01-01 DIAGNOSIS — Z94.0 KIDNEY TRANSPLANTED: Primary | ICD-10-CM

## 2024-01-01 DIAGNOSIS — R10.13 ABDOMINAL PAIN, EPIGASTRIC: Primary | ICD-10-CM

## 2024-01-01 LAB
ABO/RH(D): NORMAL
ALBUMIN SERPL BCG-MCNC: 3.1 G/DL (ref 3.5–5.2)
ALP SERPL-CCNC: 115 U/L (ref 40–150)
ALT SERPL W P-5'-P-CCNC: 8 U/L (ref 0–50)
ANION GAP SERPL CALCULATED.3IONS-SCNC: 12 MMOL/L (ref 7–15)
ANION GAP SERPL CALCULATED.3IONS-SCNC: 9 MMOL/L (ref 7–15)
ANTIBODY SCREEN: NEGATIVE
AST SERPL W P-5'-P-CCNC: 11 U/L (ref 0–45)
BASOPHILS # BLD AUTO: 0 10E3/UL (ref 0–0.2)
BASOPHILS NFR BLD AUTO: 0 %
BILIRUB SERPL-MCNC: 0.2 MG/DL
BLD PROD TYP BPU: NORMAL
BLOOD COMPONENT TYPE: NORMAL
BUN SERPL-MCNC: 66.7 MG/DL (ref 8–23)
BUN SERPL-MCNC: 70 MG/DL (ref 8–23)
CALCIUM SERPL-MCNC: 8.6 MG/DL (ref 8.8–10.2)
CALCIUM SERPL-MCNC: 9.5 MG/DL (ref 8.8–10.2)
CHLORIDE SERPL-SCNC: 101 MMOL/L (ref 98–107)
CHLORIDE SERPL-SCNC: 96 MMOL/L (ref 98–107)
CODING SYSTEM: NORMAL
CREAT SERPL-MCNC: 2.75 MG/DL (ref 0.51–0.95)
CREAT SERPL-MCNC: 2.86 MG/DL (ref 0.51–0.95)
CROSSMATCH: NORMAL
DEPRECATED HCO3 PLAS-SCNC: 26 MMOL/L (ref 22–29)
DEPRECATED HCO3 PLAS-SCNC: 27 MMOL/L (ref 22–29)
EGFRCR SERPLBLD CKD-EPI 2021: 17 ML/MIN/1.73M2
EGFRCR SERPLBLD CKD-EPI 2021: 18 ML/MIN/1.73M2
EOSINOPHIL # BLD AUTO: 0.1 10E3/UL (ref 0–0.7)
EOSINOPHIL NFR BLD AUTO: 1 %
ERYTHROCYTE [DISTWIDTH] IN BLOOD BY AUTOMATED COUNT: 16.1 % (ref 10–15)
ERYTHROCYTE [DISTWIDTH] IN BLOOD BY AUTOMATED COUNT: 16.4 % (ref 10–15)
GLUCOSE SERPL-MCNC: 109 MG/DL (ref 70–99)
GLUCOSE SERPL-MCNC: 255 MG/DL (ref 70–99)
HCT VFR BLD AUTO: 22.1 % (ref 35–47)
HCT VFR BLD AUTO: 26.7 % (ref 35–47)
HGB BLD-MCNC: 6.4 G/DL (ref 11.7–15.7)
HGB BLD-MCNC: 8.1 G/DL (ref 11.7–15.7)
HOLD SPECIMEN: NORMAL
IMM GRANULOCYTES # BLD: 0.1 10E3/UL
IMM GRANULOCYTES NFR BLD: 1 %
ISSUE DATE AND TIME: NORMAL
ISSUE DATE AND TIME: NORMAL
LYMPHOCYTES # BLD AUTO: 1.2 10E3/UL (ref 0.8–5.3)
LYMPHOCYTES NFR BLD AUTO: 10 %
MAGNESIUM SERPL-MCNC: 1.5 MG/DL (ref 1.7–2.3)
MCH RBC QN AUTO: 25.3 PG (ref 26.5–33)
MCH RBC QN AUTO: 25.6 PG (ref 26.5–33)
MCHC RBC AUTO-ENTMCNC: 29 G/DL (ref 31.5–36.5)
MCHC RBC AUTO-ENTMCNC: 30.3 G/DL (ref 31.5–36.5)
MCV RBC AUTO: 84 FL (ref 78–100)
MCV RBC AUTO: 87 FL (ref 78–100)
MONOCYTES # BLD AUTO: 1.2 10E3/UL (ref 0–1.3)
MONOCYTES NFR BLD AUTO: 10 %
NEUTROPHILS # BLD AUTO: 9.4 10E3/UL (ref 1.6–8.3)
NEUTROPHILS NFR BLD AUTO: 78 %
NRBC # BLD AUTO: 0 10E3/UL
NRBC BLD AUTO-RTO: 0 /100
PLATELET # BLD AUTO: 219 10E3/UL (ref 150–450)
PLATELET # BLD AUTO: 244 10E3/UL (ref 150–450)
POTASSIUM SERPL-SCNC: 4.6 MMOL/L (ref 3.4–5.3)
POTASSIUM SERPL-SCNC: 5.3 MMOL/L (ref 3.4–5.3)
PROT SERPL-MCNC: 5.6 G/DL (ref 6.4–8.3)
RADIOLOGIST FLAGS: ABNORMAL
RADIOLOGIST FLAGS: ABNORMAL
RBC # BLD AUTO: 2.53 10E6/UL (ref 3.8–5.2)
RBC # BLD AUTO: 3.17 10E6/UL (ref 3.8–5.2)
SODIUM SERPL-SCNC: 134 MMOL/L (ref 135–145)
SODIUM SERPL-SCNC: 137 MMOL/L (ref 135–145)
SPECIMEN EXPIRATION DATE: NORMAL
TACROLIMUS BLD-MCNC: 3.6 UG/L (ref 5–15)
TME LAST DOSE: ABNORMAL H
TME LAST DOSE: ABNORMAL H
TROPONIN T SERPL HS-MCNC: 66 NG/L
UNIT ABO/RH: NORMAL
UNIT NUMBER: NORMAL
UNIT STATUS: NORMAL
UNIT TYPE ISBT: 600
WBC # BLD AUTO: 12.1 10E3/UL (ref 4–11)
WBC # BLD AUTO: 7.2 10E3/UL (ref 4–11)

## 2024-01-01 PROCEDURE — G0463 HOSPITAL OUTPT CLINIC VISIT: HCPCS | Performed by: CASE MANAGER/CARE COORDINATOR

## 2024-01-01 PROCEDURE — 250N000011 HC RX IP 250 OP 636

## 2024-01-01 PROCEDURE — 86900 BLOOD TYPING SEROLOGIC ABO: CPT | Performed by: EMERGENCY MEDICINE

## 2024-01-01 PROCEDURE — 80197 ASSAY OF TACROLIMUS: CPT | Performed by: INTERNAL MEDICINE

## 2024-01-01 PROCEDURE — 250N000013 HC RX MED GY IP 250 OP 250 PS 637: Performed by: CASE MANAGER/CARE COORDINATOR

## 2024-01-01 PROCEDURE — P9016 RBC LEUKOCYTES REDUCED: HCPCS

## 2024-01-01 PROCEDURE — 96375 TX/PRO/DX INJ NEW DRUG ADDON: CPT

## 2024-01-01 PROCEDURE — 250N000013 HC RX MED GY IP 250 OP 250 PS 637: Performed by: STUDENT IN AN ORGANIZED HEALTH CARE EDUCATION/TRAINING PROGRAM

## 2024-01-01 PROCEDURE — 96374 THER/PROPH/DIAG INJ IV PUSH: CPT | Mod: 59

## 2024-01-01 PROCEDURE — 999N000157 HC STATISTIC RCP TIME EA 10 MIN

## 2024-01-01 PROCEDURE — 93005 ELECTROCARDIOGRAM TRACING: CPT

## 2024-01-01 PROCEDURE — 99292 CRITICAL CARE ADDL 30 MIN: CPT

## 2024-01-01 PROCEDURE — 84484 ASSAY OF TROPONIN QUANT: CPT | Performed by: EMERGENCY MEDICINE

## 2024-01-01 PROCEDURE — 99207 PR APP CREDIT; MD BILLING SHARED VISIT: CPT | Performed by: HOSPITALIST

## 2024-01-01 PROCEDURE — 71260 CT THORAX DX C+: CPT

## 2024-01-01 PROCEDURE — 250N000009 HC RX 250: Performed by: EMERGENCY MEDICINE

## 2024-01-01 PROCEDURE — 80048 BASIC METABOLIC PNL TOTAL CA: CPT | Performed by: PATHOLOGY

## 2024-01-01 PROCEDURE — 83735 ASSAY OF MAGNESIUM: CPT | Performed by: PATHOLOGY

## 2024-01-01 PROCEDURE — 250N000011 HC RX IP 250 OP 636: Performed by: EMERGENCY MEDICINE

## 2024-01-01 PROCEDURE — 85025 COMPLETE CBC W/AUTO DIFF WBC: CPT | Performed by: EMERGENCY MEDICINE

## 2024-01-01 PROCEDURE — 71250 CT THORAX DX C-: CPT | Mod: GC | Performed by: RADIOLOGY

## 2024-01-01 PROCEDURE — 36415 COLL VENOUS BLD VENIPUNCTURE: CPT | Performed by: EMERGENCY MEDICINE

## 2024-01-01 PROCEDURE — 99236 HOSP IP/OBS SAME DATE HI 85: CPT | Performed by: STUDENT IN AN ORGANIZED HEALTH CARE EDUCATION/TRAINING PROGRAM

## 2024-01-01 PROCEDURE — 99000 SPECIMEN HANDLING OFFICE-LAB: CPT | Performed by: PATHOLOGY

## 2024-01-01 PROCEDURE — 99213 OFFICE O/P EST LOW 20 MIN: CPT | Performed by: CASE MANAGER/CARE COORDINATOR

## 2024-01-01 PROCEDURE — 250N000011 HC RX IP 250 OP 636: Performed by: INTERNAL MEDICINE

## 2024-01-01 PROCEDURE — 36415 COLL VENOUS BLD VENIPUNCTURE: CPT | Performed by: PATHOLOGY

## 2024-01-01 PROCEDURE — 250N000011 HC RX IP 250 OP 636: Performed by: STUDENT IN AN ORGANIZED HEALTH CARE EDUCATION/TRAINING PROGRAM

## 2024-01-01 PROCEDURE — 120N000001 HC R&B MED SURG/OB

## 2024-01-01 PROCEDURE — 99223 1ST HOSP IP/OBS HIGH 75: CPT | Mod: GC | Performed by: SURGERY

## 2024-01-01 PROCEDURE — 99418 PROLNG IP/OBS E/M EA 15 MIN: CPT | Performed by: STUDENT IN AN ORGANIZED HEALTH CARE EDUCATION/TRAINING PROGRAM

## 2024-01-01 PROCEDURE — 86922 COMPATIBILITY TEST ANTIGLOB: CPT

## 2024-01-01 PROCEDURE — 85027 COMPLETE CBC AUTOMATED: CPT | Performed by: PATHOLOGY

## 2024-01-01 PROCEDURE — 80053 COMPREHEN METABOLIC PANEL: CPT | Performed by: EMERGENCY MEDICINE

## 2024-01-01 PROCEDURE — 99291 CRITICAL CARE FIRST HOUR: CPT

## 2024-01-01 RX ORDER — ONDANSETRON 2 MG/ML
4 INJECTION INTRAMUSCULAR; INTRAVENOUS EVERY 6 HOURS PRN
Status: DISCONTINUED | OUTPATIENT
Start: 2024-01-01 | End: 2024-01-01 | Stop reason: HOSPADM

## 2024-01-01 RX ORDER — CALCIUM CARBONATE/VITAMIN D3 600 MG-10
TABLET ORAL
Qty: 120 TABLET | Refills: 0 | Status: SHIPPED | OUTPATIENT
Start: 2024-01-01 | End: 2024-01-01

## 2024-01-01 RX ORDER — SALIVA STIMULANT COMB. NO.3
1 SPRAY, NON-AEROSOL (ML) MUCOUS MEMBRANE
Status: DISCONTINUED | OUTPATIENT
Start: 2024-01-01 | End: 2024-01-01 | Stop reason: HOSPADM

## 2024-01-01 RX ORDER — NALOXONE HYDROCHLORIDE 0.4 MG/ML
0.2 INJECTION, SOLUTION INTRAMUSCULAR; INTRAVENOUS; SUBCUTANEOUS
Status: DISCONTINUED | OUTPATIENT
Start: 2024-01-01 | End: 2024-01-01 | Stop reason: HOSPADM

## 2024-01-01 RX ORDER — LORAZEPAM 1 MG/1
1 TABLET ORAL
Status: DISCONTINUED | OUTPATIENT
Start: 2024-01-01 | End: 2024-01-01 | Stop reason: HOSPADM

## 2024-01-01 RX ORDER — IOPAMIDOL 755 MG/ML
72 INJECTION, SOLUTION INTRAVASCULAR ONCE
Status: COMPLETED | OUTPATIENT
Start: 2024-01-01 | End: 2024-01-01

## 2024-01-01 RX ORDER — CARBOXYMETHYLCELLULOSE SODIUM 5 MG/ML
1-2 SOLUTION/ DROPS OPHTHALMIC
Status: DISCONTINUED | OUTPATIENT
Start: 2024-01-01 | End: 2024-01-01 | Stop reason: HOSPADM

## 2024-01-01 RX ORDER — ONDANSETRON 2 MG/ML
4 INJECTION INTRAMUSCULAR; INTRAVENOUS ONCE
Status: COMPLETED | OUTPATIENT
Start: 2024-01-01 | End: 2024-01-01

## 2024-01-01 RX ORDER — LORAZEPAM 2 MG/ML
1 INJECTION INTRAMUSCULAR
Status: DISCONTINUED | OUTPATIENT
Start: 2024-01-01 | End: 2024-01-01 | Stop reason: HOSPADM

## 2024-01-01 RX ORDER — HYDROMORPHONE HYDROCHLORIDE 2 MG/1
2 TABLET ORAL
Status: DISCONTINUED | OUTPATIENT
Start: 2024-01-01 | End: 2024-01-01 | Stop reason: HOSPADM

## 2024-01-01 RX ORDER — LIDOCAINE 40 MG/G
CREAM TOPICAL
Status: CANCELLED | OUTPATIENT
Start: 2024-01-01

## 2024-01-01 RX ORDER — ONDANSETRON 4 MG/1
4 TABLET, ORALLY DISINTEGRATING ORAL EVERY 6 HOURS PRN
Status: DISCONTINUED | OUTPATIENT
Start: 2024-01-01 | End: 2024-01-01 | Stop reason: HOSPADM

## 2024-01-01 RX ORDER — FENTANYL CITRATE 50 UG/ML
INJECTION, SOLUTION INTRAMUSCULAR; INTRAVENOUS
Status: COMPLETED
Start: 2024-01-01 | End: 2024-01-01

## 2024-01-01 RX ORDER — NALOXONE HYDROCHLORIDE 0.4 MG/ML
0.1 INJECTION, SOLUTION INTRAMUSCULAR; INTRAVENOUS; SUBCUTANEOUS
Status: DISCONTINUED | OUTPATIENT
Start: 2024-01-01 | End: 2024-01-01 | Stop reason: HOSPADM

## 2024-01-01 RX ORDER — HYDROMORPHONE HYDROCHLORIDE 1 MG/ML
0.5 INJECTION, SOLUTION INTRAMUSCULAR; INTRAVENOUS; SUBCUTANEOUS
Status: DISCONTINUED | OUTPATIENT
Start: 2024-01-01 | End: 2024-01-01

## 2024-01-01 RX ORDER — FENTANYL CITRATE 50 UG/ML
50 INJECTION, SOLUTION INTRAMUSCULAR; INTRAVENOUS ONCE
Status: COMPLETED | OUTPATIENT
Start: 2024-01-01 | End: 2024-01-01

## 2024-01-01 RX ORDER — TACROLIMUS 0.5 MG/1
2.5 CAPSULE ORAL 2 TIMES DAILY
Qty: 300 CAPSULE | Refills: 11 | Status: SHIPPED | OUTPATIENT
Start: 2024-01-01

## 2024-01-01 RX ORDER — ONDANSETRON 2 MG/ML
INJECTION INTRAMUSCULAR; INTRAVENOUS
Status: COMPLETED
Start: 2024-01-01 | End: 2024-01-01

## 2024-01-01 RX ORDER — PANTOPRAZOLE SODIUM 40 MG/1
40 TABLET, DELAYED RELEASE ORAL DAILY
Qty: 90 TABLET | Refills: 3 | Status: SHIPPED | OUTPATIENT
Start: 2024-01-01

## 2024-01-01 RX ORDER — HYDROMORPHONE HYDROCHLORIDE 1 MG/ML
0.5 INJECTION, SOLUTION INTRAMUSCULAR; INTRAVENOUS; SUBCUTANEOUS
Status: DISCONTINUED | OUTPATIENT
Start: 2024-01-01 | End: 2024-01-01 | Stop reason: HOSPADM

## 2024-01-01 RX ORDER — SODIUM CHLORIDE 9 MG/ML
INJECTION, SOLUTION INTRAVENOUS CONTINUOUS
Status: DISCONTINUED | OUTPATIENT
Start: 2024-01-01 | End: 2024-01-01 | Stop reason: HOSPADM

## 2024-01-01 RX ORDER — SODIUM CHLORIDE 9 MG/ML
INJECTION, SOLUTION INTRAVENOUS CONTINUOUS
Status: CANCELLED | OUTPATIENT
Start: 2024-01-01

## 2024-01-01 RX ORDER — HYDROMORPHONE HYDROCHLORIDE 1 MG/ML
0.3 INJECTION, SOLUTION INTRAMUSCULAR; INTRAVENOUS; SUBCUTANEOUS
Status: DISCONTINUED | OUTPATIENT
Start: 2024-01-01 | End: 2024-01-01 | Stop reason: HOSPADM

## 2024-01-01 RX ORDER — LORAZEPAM 2 MG/ML
0.25 INJECTION INTRAMUSCULAR ONCE
Status: COMPLETED | OUTPATIENT
Start: 2024-01-01 | End: 2024-01-01

## 2024-01-01 RX ORDER — HYDROMORPHONE HYDROCHLORIDE 1 MG/ML
2 SOLUTION ORAL
Status: DISCONTINUED | OUTPATIENT
Start: 2024-01-01 | End: 2024-01-01 | Stop reason: HOSPADM

## 2024-01-01 RX ORDER — CALCIUM CARBONATE/VITAMIN D3 600 MG-10
TABLET ORAL
Qty: 120 TABLET | Refills: 0 | Status: SHIPPED | OUTPATIENT
Start: 2024-01-01

## 2024-01-01 RX ORDER — ATROPINE SULFATE 10 MG/ML
2 SOLUTION/ DROPS OPHTHALMIC EVERY 4 HOURS PRN
Status: DISCONTINUED | OUTPATIENT
Start: 2024-01-01 | End: 2024-01-01 | Stop reason: HOSPADM

## 2024-01-01 RX ORDER — MAGNESIUM OXIDE 400 MG/1
400 TABLET ORAL DAILY
Qty: 30 TABLET | Refills: 6
Start: 2024-01-01

## 2024-01-01 RX ORDER — ONDANSETRON 2 MG/ML
INJECTION INTRAMUSCULAR; INTRAVENOUS
Status: DISCONTINUED
Start: 2024-01-01 | End: 2024-01-01 | Stop reason: HOSPADM

## 2024-01-01 RX ORDER — HYDRALAZINE HYDROCHLORIDE 25 MG/1
50 TABLET, FILM COATED ORAL ONCE
Status: COMPLETED | OUTPATIENT
Start: 2024-01-01 | End: 2024-01-01

## 2024-01-01 RX ORDER — ACETAMINOPHEN 650 MG/1
650 SUPPOSITORY RECTAL EVERY 6 HOURS PRN
Status: DISCONTINUED | OUTPATIENT
Start: 2024-01-01 | End: 2024-01-01 | Stop reason: HOSPADM

## 2024-01-01 RX ORDER — HYDROMORPHONE HYDROCHLORIDE 1 MG/ML
1 SOLUTION ORAL
Status: DISCONTINUED | OUTPATIENT
Start: 2024-01-01 | End: 2024-01-01 | Stop reason: HOSPADM

## 2024-01-01 RX ADMIN — ONDANSETRON 4 MG: 2 INJECTION INTRAMUSCULAR; INTRAVENOUS at 23:50

## 2024-01-01 RX ADMIN — ONDANSETRON: 2 INJECTION INTRAMUSCULAR; INTRAVENOUS at 01:37

## 2024-01-01 RX ADMIN — IOPAMIDOL 72 ML: 755 INJECTION, SOLUTION INTRAVENOUS at 23:46

## 2024-01-01 RX ADMIN — LORAZEPAM 0.25 MG: 2 INJECTION INTRAMUSCULAR; INTRAVENOUS at 01:34

## 2024-01-01 RX ADMIN — HYDRALAZINE HYDROCHLORIDE 50 MG: 25 TABLET, FILM COATED ORAL at 16:06

## 2024-01-01 RX ADMIN — LORAZEPAM 1 MG: 2 INJECTION INTRAMUSCULAR; INTRAVENOUS at 09:16

## 2024-01-01 RX ADMIN — HYDROMORPHONE HYDROCHLORIDE 2 MG: 1 SOLUTION ORAL at 04:49

## 2024-01-01 RX ADMIN — LORAZEPAM 1 MG: 2 INJECTION INTRAMUSCULAR; INTRAVENOUS at 05:15

## 2024-01-01 RX ADMIN — FENTANYL CITRATE 50 MCG: 50 INJECTION, SOLUTION INTRAMUSCULAR; INTRAVENOUS at 00:26

## 2024-01-01 RX ADMIN — HYDROMORPHONE HYDROCHLORIDE 0.5 MG: 1 INJECTION, SOLUTION INTRAMUSCULAR; INTRAVENOUS; SUBCUTANEOUS at 12:30

## 2024-01-01 RX ADMIN — FENTANYL CITRATE 50 MCG: 50 INJECTION, SOLUTION INTRAMUSCULAR; INTRAVENOUS at 01:29

## 2024-01-01 RX ADMIN — SODIUM CHLORIDE 80 ML: 9 INJECTION, SOLUTION INTRAVENOUS at 23:47

## 2024-01-01 RX ADMIN — HYDROMORPHONE HYDROCHLORIDE 0.5 MG: 1 INJECTION, SOLUTION INTRAMUSCULAR; INTRAVENOUS; SUBCUTANEOUS at 06:53

## 2024-01-01 ASSESSMENT — ACTIVITIES OF DAILY LIVING (ADL)
ADLS_ACUITY_SCORE: 43
ADLS_ACUITY_SCORE: 35
ADLS_ACUITY_SCORE: 43
ADLS_ACUITY_SCORE: 35

## 2024-01-01 ASSESSMENT — PAIN SCALES - GENERAL: PAINLEVEL: NO PAIN (0)

## 2024-01-03 NOTE — PROGRESS NOTES
Social Work - Intervention  [facility]  Data/Intervention:    Patient Name: Maribel Yang Goes By: Maribel    /Age: 1950 (73 year old)     Visit Type: telephone  Referral Source: Cardiology  Reason for Referral: Transportation    Collaborated With:    -Maribel- [phone]  -Kidney SOT Social Workers Sharon HOFFMAN and Heather HOFFMAN     Psychosocial Information/Concerns:  Message received from AVERY Zarco asking that SW assist Maribel as she is reporting transportation difficulties with getting to clinic.     Per my previous conversation with Maribel about transportation she typically uses her daughter's vehicle to get to appointments.     Per chart review it appears that Maribel may have [employer] Mob[employer] benefits.      Intervention/Education/Resources Provided:  I contacted Maribel and left a vm introducing myself and noting the reason for my outreach and asked for a return call.      Assessment/Plan:  I will await a return call from Maribel and provide assistance at that time.     Update: Upon further review of Maribel's chart she is post kidney transplant and would be followed by that SW team. I confirmed this with Kidney SOT SALVADOR HOFFMAN who reported that SALVADOR HOFFMAN follows Maribel. I updated Heather HOFFMAN and forwarded her the message from Carolee, and asked that she contact Maribel.      Provided patient/family with contact information and availability.    EVITA Kent, [facility]    [facility]  Ph: [phone], Pgr: [phone]  1/3/2024

## 2024-01-03 NOTE — TELEPHONE ENCOUNTER
----- Message from JOANA García sent at 1/3/2024 10:43 AM CST -----  Regarding: RE: transportation?  Angelito Zarco,    I have a call out to Maribel and will give her a bit to call back. Would you please put in a Social Work referral just to better track things?     Thank you!    Korina  ----- Message -----  From: Carolee Gar RN  Sent: 1/3/2024   8:19 AM CST  To: JOANA García  Subject: transportation?                                  Hi-  Would you be able to assist Maribel? She told me she is having transportation difficulties in getting to clinic.  Thanks   Carolee

## 2024-01-03 NOTE — TELEPHONE ENCOUNTER
BRIEF SOCIAL WORK NOTE      Left Maribel message to address transportation concerns. SW will continue to follow for psychosocial support, resources and advocate on behalf of the patient.     EVITA Hernandez, [facility]  Outpatient Kidney/Pancreas/Auto Islet Transplant Program   [address] [place] [address] [place] [address]  [email or web address]  Office: [phone] I Fax: [phone]

## 2024-01-05 NOTE — TELEPHONE ENCOUNTER
Pt is requesting new rx for    Pantoprazole sodium 40mg tabs    Did not see on active med list please verify and send new rx. Thank you!    [place] spec/mail pharmacy  [phone]

## 2024-01-08 NOTE — TELEPHONE ENCOUNTER
Hitesh See MD Ututalum, Teresa, RN  I think this is likely chronic changes and a biopsy might be a challenge with her being on Plavix.  However, she is on lower immunosuppression and not a kidney transplant candidate again.  Can you see if she could come off Plavix for a biopsy?    Dhruv      ----- Message -----  From: Lisseth Heath, RN  Sent: 1/8/2024  10:10 AM CST  To: Hitesh See MD  Subject: cr baseline                                      Maribel Mackey's Cr has been elevated for a while.  She had COVID with AMY  Cr was set at 1.6-1.9 inpatient and Cr was 1.8 and higher,  Her Cr has remained > 2.0  It has increased this past November to 2.8-2.9  She is 73 y.o. her GFR is <20 since November  [facility] managing her diuretics. Although I don't see it on her med list, she does have Torsemide. I see documentation last Dec to take 10 mg x 3 days.  We have recommended she see general nephrology. She is not scheduled yet. I will reach out to Neph nurses.  Just wanted to make sure there is nothing else we need to do.    Thanks,  Torey            OUTCOME:  Staff message sent to Dr Rosas regarding holding Plavix prior to biopsy.

## 2024-01-08 NOTE — TELEPHONE ENCOUNTER
Tacrolimus = 3.6  (1/6/24)  Goal 4-6  Current Tac dose 2 mg BID    PLAN:   Call and confirm this was a good 12 - hour trough.   Verify current dose.   Confirm no new medications or illness (shaheed. Diarrhea).  Confirm any MISSED DOSES.   If good trough and correct dose above, recommend:  increase dose to 2.5 mg BID.     Is this more than a 50% increase or decrease in current IS dose: No  If YES, justification: NA  *If > 50% change in immunosuppression dose, repeat labs in 1 week.     Recheck level in 2 weeks and make sure it is a good trough to avoid additional lab draws.

## 2024-01-09 NOTE — PROGRESS NOTES
Kidney Transplant biopsy discussed with Maribel Yang.  Kidney Transplant Biopsy ordered.  OK to hold Plavix 5 days prior to Biopsy.

## 2024-01-09 NOTE — TELEPHONE ENCOUNTER
"Sincere Rosas MD Ututalum, Teresa, RN; Vianney Flores RN  Yes, it is fine to hold it. 5 days should be sufficient.      ----- Message -----  From: Lisseth Heath RN  Sent: 1/8/2024   3:21 PM CST  To: Sincere Rosas MD  Subject: Plavix                                          Dr Rosas,    Dr See (Transplant Nephrologist) was wondering if it was okay to hold Plavix for a Kidney Transplant Biopsy?    If you are okay to hold it, how many day prior to procedure?    Thanks,  Torey Montanez \"TOREY\"Ivana  Post Kidney / Pancreas Transplant Coordinator  [facility]        Discussed biopsy recommendations with Maribel Yang  Number to IR scheduling provided in case she misses call or has questions for IR.  Discussed increased tac dose to 2.5 mg BID.  Updated Medlist.  Lab order placed.       "

## 2024-01-10 NOTE — TELEPHONE ENCOUNTER
Hitesh See MD Ututalum, Teresa, RN  A month is fine.      ----- Message -----  From: Lisseth Heath RN  Sent: 1/10/2024   2:24 PM CST  To: Hitesh See MD  Subject: mag                                              We didn't order the Mag level so I didn't route it to you.  I will start her on  magnesium oxide 400 mg daily.  When do you want recheck?        OUTCOME:  Mag level ordered  Mag oxide prescribed.
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
## 2024-01-24 NOTE — NURSING NOTE
Chief Complaint   Patient presents with    Follow Up     Return CORE, labs done 1/6/24       Vitals were taken and medications reconciled.    Sally Reyes CNA  3:36 PM

## 2024-01-24 NOTE — PATIENT INSTRUCTIONS
Patient Instructions:  It was a pleasure to see you in the cardiology clinic today.      If you have any questions, call  Padma Flores RN, at [phone].   [facility] Cardiology Clinics.  To schedule an appointment or to leave a message for your Care Team Press #1  If you are a physician calling for another physician Press #2  For Billing Press #3  For Medical Records Press #4  We are encouraging the use of [employer] to communicate with your HealthCare Provider    Note the new medications: none  Stop the following medications: none    The results from today include: none  Please follow up with Nicolette Vasquez NP in six months      If you have an urgent need after hours (8:00 am to 4:30 pm) please call [phone] and ask for the cardiology fellow on call.
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
## 2024-01-24 NOTE — NURSING NOTE
Patient given 50 mg of hydralazine at clinic visit for BP of 161/114. She was discharged to home with  and was instructed to take her BP medications when she get home.

## 2024-01-25 NOTE — TELEPHONE ENCOUNTER
Left Voicemail (1st Attempt), sent myc for the patient to call back and reschedule the following:    Appointment type: Return Patient  Provider: Dr. Russ  Return date: Reschedule 3/11 Appt due to the provider being unavailable  Specialty phone number: [phone]  Additional appointment(s) needed: n/a  Additonal Notes: Reschedule next available with Dr. Russ in [place] or [place] [facility]    Left CC#

## 2024-01-30 NOTE — PROGRESS NOTES
"RNCC received msg from Dr. Mckeon stating to reach out to pt regarding critical findings on CT scan from today - \"AAA has doubled in size since August scan. Is now ~ 9-14 cm per radiologist and recommendation is for pt to be seen asap in vascular clinic for evaluation.\"    Msg sent to Dr. Ferrara's team - Lizzeth VARELA who will assist in getting established pt scheduled based on these rec's.     [facility] sent to pt regarding need for vascular appointment asap.   "

## 2024-01-30 NOTE — TELEPHONE ENCOUNTER
Followup    Called by Radiology re growth of aortic aneursym.   This interval growth should be evaluated by vascular surgery asap   Nurses to reach out to her to discuss and let her know to schedule an appt with vascular surgery (last saw Dr Ferrara in 7/2023)    Nii Mckeon MD, MHA  Associate Professor of Medicine  Section of Interventional Pulmonology   Division of Pulmonary, Allergy, Critical Care and Sleep Medicine   [facility], [employer]  Pager: [phone]   Office: [phone]  Email: [email or web address]

## 2024-01-30 NOTE — TELEPHONE ENCOUNTER
"Patient has history AAA s/p EVAR with aorto-left-uniiliac device and left to right fem-fem bypass complicated by post-op STEMI and fem-fem graft infection, s/p re-do fem-fem bypass with cryo artery, right femoral pseudoenaurysm s/p repair in June 2023 with Dr. Ferrara.    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 See encounter from 9/29/23- staff message received by Larry Thornton for patient to follow up in clinic outpatient. Patient did not follow up at [facility] as advised.     Message received from Syl Thompson RN from Dr. Mckeon with pulmonology today that patient completed CT chest today to follow up on lung nodules and radiologist contacted ordering provider Dr. Mckeon that patients AAA \"has doubled in size\". Dr. Mckeon is requesting patient be seen by vascular surgery ASAP.     Routing to Dr. Garcia to please review and advise if patient needs additional imaging prior to consult. Patient's CT was only of chest and w/o contrast. In addition patient has not had imaging of lower extremities.    Lizzeth BLAND, RN    [facility]  Vascular Health Center  Office: [phone]  Fax: [phone]  "

## 2024-01-31 NOTE — TELEPHONE ENCOUNTER
Monse from the [facility]: Dr Martinez has questions re: referral - [phone] - ask to speak to Monse in triage

## 2024-01-31 NOTE — TELEPHONE ENCOUNTER
Spoke with Syl Thompson RN with pulmonary who originally contacted [facility] in regards to patient needing to be seen. Discussed they need to place referral to [facility] as patient has not been seen at [facility] and her current physician needs to send the referral. Syl states patients PCP has already placed referral to [facility] per chart review.    Per Dr. Garcia he has called over to [facility] and spoke with vascular surgery and they are expecting urgent referral for patient. I have contacted system imaging department at [facility] to push CT images to [place].    Lizzeth BLAND, RN    [facility]  Vascular Health Center  Office: [phone]  Fax: [phone]

## 2024-01-31 NOTE — TELEPHONE ENCOUNTER
Triage -   Please follow-up on this referral -   Not sure I we need to call vascular and confirm that we do not have anyone at the [facility]? If not referral to [facility] but not sure what else we need to do?  Thanks  -PN

## 2024-02-02 NOTE — TELEPHONE ENCOUNTER
Per Dr. Radha Rivas has not received referral for below. He spoke with vascular surgeon Dr. Narvaez who states they have not received the referral.   Emergency referral again placed to [facility] Dr Narvaez vascular surgery and faxed to [phone]. Right fax confirmed [phone].     Lizzeth BLAND, RN    [facility]  Office: [phone]  Fax: [phone]

## 2024-02-03 PROBLEM — I71.13: Status: ACTIVE | Noted: 2024-01-01

## 2024-02-03 NOTE — CONSULTS
SPIRITUAL HEALTH SERVICES - Consult Note   [facility] Emergency Department     Referral Source: On-Call Page      I received an on-call page when Maribel transferred to comfort care. I checked with the nurse who asked the family if they would like support from [facility]. The family declined any need for SH[facility] at this time.     Plan: [facility] remains available for support. Please page the on-call  for end of life support.       Ladonna Bocanegra (they/themAlivia Abrams  Spiritual Health Services  Chaplain Resident    [facility] routine referrals?*[phone]  [facility] available 24/7 for emergent requests/referrals, either by paging the on-call  or by entering an ASAP/STAT consult in Epic (this will also page the on-call ).

## 2024-02-03 NOTE — DEATH PRONOUNCEMENT
House Officer Death Pronouncement    Called by nursing staff to pronounce Maribel Yang dead.    This is a 73F hx of past AAA with prior fem-fem bypass s/p EVAR (4/6/21), redo femoropopliteal bypass with cryo artery, right femoral pseudoaneurysm repair (6/2023), HTN, Inferior STEMI s/p RCA stent (4/7/21), HFrEF (30-35%), PAD, SCC of R lung s/p SBRT (2014), anal canal carcinoma s/p chemoradiation (2018), ESRD s/p living donor kidney transplant (2004), with rejection 2005, prolonged tobacco use, COPD, long COVID syndrome 2023, chronic respiratory failure with chronic O2 use, chronic anemia, history of DVT, and osteoporosis presenting with sudden abdominal pain, found to have ruptured thoracic aortic aneurysm.  Per notes patient, outpatient patient's aneurysm had been identified as enlarged, with outpatient provider attempting to get emergency referral to [place] for complex repair, patient complained of sudden onset abdominal pain 2/2, was admitted found to have ruptured thoracic aortic aneurysm, vascular consulted, no intervention advised, with poor prognosis, was moved to comfort cares.  Patient was found to be pulseless by staff today 2/3/24, death was pronounced.    Physical Exam: Unresponsive to noxious stimuli, Spontaneous respirations absent, Breath sounds absent, Carotid pulse absent, Heart sounds absent, and Pupillary light reflex absent    Patient was pronounced dead at 1345hrs (1:45PM), February 3, 2024.    No data filed     Principal Problem:    Ruptured aneurysm of descending thoracic aorta (H)     Infectious disease present?: NO    Communicable disease present? (examples: HIV, chicken pox, TB, Ebola, CJD) :  NO    Multi-drug resistant organism present? (example: MRSA):   none known at present  Hx 6/2021- Methicillin-resistant Staphylococcus aureus infection of bovine fem-fem bypass graft with MSSA bacteremia     Please consider an autopsy if any of the following exist:  NO Unexpected or unexplained death  during or following any dental, medical, or surgical diagnostic treatment procedures.   NO Death of mother at or up to seven days after delivery.     NO All  and pediatric deaths.     NO Death where the cause is sufficiently obscure to delay completion of the death certificate.   NO Deaths in which autopsy would confirm a suspected illness/condition that would affect surviving family members or recipients of transplanted organs.     The following deaths must be reported to the 's Office:  NO A death that may be due entirely or in part to any factors other than natural disease (recent surgery, recent trauma, suspected abuse/neglect).   NO A death that may be an accident, suicide, or homicide.     NO Any sudden, unexpected death in which there is no prior history of significant heart disease or any other condition associated with sudden death.   NO A death under suspicious, unusual, or unexpected circumstances.    NO Any death which is apparently due to natural causes but in which the  does not have a personal physician familiar with the patient s medical history, social, or environmental situation or the circumstances of the terminal event.   NO Any death apparently due to Sudden Infant Death Syndrome.     NO Deaths that occur during, in association with, or as consequences of a diagnostic, therapeutic, or anesthetic procedure.   NO Any death in which a fracture of a major bone has occurred within the past (6) six months.   NO A death of persons note seen by their physician within 120 days of demise.     NO Any death in which the  was an inmate of a public institution or was in the custody of Law Enforcement personnel.   NO  All unexpected deaths of children   NO Solid organ donors   NO Unidentified bodies   YES Deaths of persons whose bodies are to be cremated or otherwise disposed of so that the bodies will later be unavailable for examination;   NO Deaths unattended by a physician  outside of a licensed healthcare facility or licensed residential hospice program   NO Deaths occurring within 24 hours of arrival to a health care facility if death is unexpected.    NO Deaths associated with the decedent s employment.   NO Deaths attributed to acts of terrorism.   NO Any death in which there is uncertainty as to whether it is a medical examiner s care should be discussed with the medical investigator.      Death Certificate to be directed to Dr Jenifer Barajas  Attending physician, Maritza Thompson MD, notified of death.    Body disposition: Autopsy was discussed with family member:  Daughter by phone.  Permission for autopsy was declined.    MADHURI Whiteside [facility]  Securely message with the [employer] (learn more here)  Text page via [employer] Paging/Directory

## 2024-02-03 NOTE — ED PROVIDER NOTES
History     Chief Complaint:  Abdominal Pain       HPI   Maribel Yang is a 73 year old female with a history of a previously repaired AAA, kidney transplant, who presents to the emergency department with severe abdominal pain. The patients  reports that she she woke up at 0400 this morning because of pain.  The pain is severe and tearing in nature.  She has never had similar pain previously. When he went downstairs he herd her screaming. She reported to him that she though she was having a a problem with her aneurysm.  She recently had a CT scan and was found to have a problem with her aneurysm though neither the patient nor her  has additional information regarding this.  I reviewed her records and she had a CT scan a few days ago which shows significant enlargement of her aneurysm to 13 cm having doubled in size.  Further review of chart indicates that Dr. Garcia had been working to facilitate treatment at [facility] given her complex previous repair.  She also has noted new shortness of breath and does not feel comfortable laying down secondary to the shortness of breath.  She is a renal transplant patient and recently has had a decrease in her kidney function.  She has not had any fevers.  No other complaints.      Independent Historian:    Spouse/partner - They report  history as noted above     Review of External Notes:  Reviewed all of the numerous notes over the past several days regarding her aneurysm.    Medications:    Proair Hfa   Lipitor   Asprin low dose   Lipitor   Caltrate   Plavix   Nexium   Apresoline   Combivent respimat   Imdur   Culturelle digestive ivone   Mag-ox   Lopressor   Zofran   Roxicodone   Protonix    Deltasone   Metamucil    Past Medical History:    Abnorma; coagulation   Age-related osteoporosis without current pathological fracture   Anemia   Antiplatelet   ASCUS, positive risk for HPV   Basal cell carcinoma   Congestive heart failure   COPD   Depression   Heart  attack   Blood transfusion   Hypertension   Immunosuppressed status   Kidney replaced transplant   LSIL   PAD   PONV  Squamous cell lung cancer   Thrombosis of leg     Past Surgical History:    Biopsy of kidney, lung or breast   Biopsy anal   Bypass graft femoral   Conization leep   CV coronary angiogram   CV pci stent drug eluting   Endovascular repair aneurysm aortoiliac   Eye surgery   Genitography surgery   Irrigation of groin   Laser CO2 vagina   Picc double lumen replacement  Pseudoaneurysm repair   Transesophageal replacement        Physical Exam   Patient Vitals for the past 24 hrs:   BP Temp Temp src Pulse Resp SpO2   02/02/24 2317 118/76 97.8  F (36.6  C) Temporal 69 26 100 %        Physical Exam  General: Resting on the gurney, appears very uncomfortable and quite ill  Head:  The scalp, face, and head appear normal  Mouth/Throat: Mucus membranes are slightly dry ad very pale  CV:  Regular rate    Normal S1 and S2  Resp:  Breath sounds significantly diminished in the bases.  GI:  Abdomen is somewhat firm    No tenderness to palpation  MS:  Normal motor assessment of all extremities.    Diminished pulses peripherally  Skin:  Very pale  Neuro:   Speech is normal and fluent. No apparent deficit.  Psych: Awake. Alert.  Normal affect.      Appropriate interactions.     Emergency Department Course   ECG  ECG results from 02/02/24   EKG 12-lead, tracing only     Value    Systolic Blood Pressure     Diastolic Blood Pressure     Ventricular Rate 87    Atrial Rate 87    IA Interval 134    QRS Duration 74        QTc 447    P Axis -28    R AXIS -10    T Axis 37    Interpretation ECG      Sinus rhythm  Possible Inferior infarct , age undetermined  Anterior infarct (cited on or before 05-SEP-2023)  Abnormal ECG  When compared with ECG of 23-SEP-2023 16:55,  T wave amplitude has increased in Anterior leads       *Note: Due to a large number of results and/or encounters for the requested time period, some results  have not been displayed. A complete set of results can be found in Results Review.     Imaging:  CT Aortic Survey w Contrast   Final Result   Abnormal   IMPRESSION:   1.  Ruptured thoracic aortic aneurysm, with extravasation from the proximal descending thoracic aorta. Associated small hemomediastinum and moderate left hemothorax. Small right pleural effusion.      2.  New occlusion of the celiac artery origin, although the distal artery and branches are reconstituted.      3.  Wedge-shaped hyperenhancement of hepatic segment 4, possibly perfusional related to arterial phase imaging, although hepatic infarcts not excluded on single phase CTA.      4.  Unchanged size of abdominal aortic aneurysm with aortobiiliac endograft.      5.  Redemonstrated right upper lobe spiculated 3.1 cm mass, suspicious for malignancy.            [Critical Result: Ruptured thoracic aortic aneurysm]      Finding was identified on 2/3/2024 12:08 AM CST.       Dr. Cheryl Hart was contacted by me on 2/3/2024 12:12 AM CST and verbalized understanding of the critical result.         Report per radiology    Laboratory:  Labs Ordered and Resulted from Time of ED Arrival to Time of ED Departure   TROPONIN T, HIGH SENSITIVITY - Abnormal       Result Value    Troponin T, High Sensitivity 66 (*)    COMPREHENSIVE METABOLIC PANEL - Abnormal    Sodium 137      Potassium 5.3      Carbon Dioxide (CO2) 27      Anion Gap 9      Urea Nitrogen 66.7 (*)     Creatinine 2.75 (*)     GFR Estimate 18 (*)     Calcium 8.6 (*)     Chloride 101      Glucose 255 (*)     Alkaline Phosphatase 115      AST 11      ALT 8      Protein Total 5.6 (*)     Albumin 3.1 (*)     Bilirubin Total 0.2     CBC WITH PLATELETS AND DIFFERENTIAL - Abnormal    WBC Count 12.1 (*)     RBC Count 2.53 (*)     Hemoglobin 6.4 (*)     Hematocrit 22.1 (*)     MCV 87      MCH 25.3 (*)     MCHC 29.0 (*)     RDW 16.4 (*)     Platelet Count 244      % Neutrophils 78      % Lymphocytes 10      %  Monocytes 10      % Eosinophils 1      % Basophils 0      % Immature Granulocytes 1      NRBCs per 100 WBC 0      Absolute Neutrophils 9.4 (*)     Absolute Lymphocytes 1.2      Absolute Monocytes 1.2      Absolute Eosinophils 0.1      Absolute Basophils 0.0      Absolute Immature Granulocytes 0.1      Absolute NRBCs 0.0     TYPE AND SCREEN, ADULT    ABO/RH(D) A NEG      Antibody Screen Negative      SPECIMEN EXPIRATION DATE [id number]     PREPARE RED BLOOD CELLS (UNIT)    ISSUE DATE AND TIME [id number]      Blood Component Type Red Blood Cells      Product Code A3711H63      Unit Status Returned      Unit Number [id number]      UNIT ABO/RH A-      CODING SYSTEM QNWF113      UNIT TYPE ISBT 0600     PREPARE RED BLOOD CELLS (UNIT)    ISSUE DATE AND TIME [id number]      Blood Component Type Red Blood Cells      Product Code H9206H26      Unit Status Returned      Unit Number [id number]      UNIT ABO/RH A-      CODING SYSTEM FSPV794      UNIT TYPE ISBT 0600     PREPARE RED BLOOD CELLS (UNIT)    Blood Component Type Red Blood Cells      Product Code X2475O75      Unit Status Not used      Unit Number [id number]      CROSSMATCH COMPATIBLE      CODING SYSTEM IPKB690      UNIT ABO/RH A-      UNIT TYPE ISBT 0600     PREPARE RED BLOOD CELLS (UNIT)    Blood Component Type Red Blood Cells      Product Code Y4511Y28      Unit Status Not used      Unit Number [id number]      CROSSMATCH COMPATIBLE      CODING SYSTEM EYNY125      UNIT ABO/RH A-      UNIT TYPE ISBT 0600     PREPARE RED BLOOD CELLS (UNIT)    Blood Component Type Red Blood Cells      Product Code F2642O24      Unit Status Not used      Unit Number [id number]      CROSSMATCH COMPATIBLE      CODING SYSTEM QNKH304      UNIT ABO/RH A-      UNIT TYPE ISBT 0600     PREPARE RED BLOOD CELLS (UNIT)    Blood Component Type Red Blood Cells      Product Code L4332B95      Unit Status Ready for issue      Unit Number [id number]      CROSSMATCH  COMPATIBLE      CODING SYSTEM CTFQ900     PREPARE RED BLOOD CELLS (UNIT)    Blood Component Type Red Blood Cells      Product Code W9658Q06      Unit Status Ready for issue      Unit Number [id number]      CROSSMATCH COMPATIBLE      CODING SYSTEM AFRG070     ABO/RH TYPE AND SCREEN          Emergency Department Course & Assessments:       Interventions:  Medications   naloxone (NARCAN) injection 0.1 mg (has no administration in time range)   naloxone (NARCAN) injection 0.2 mg (has no administration in time range)   sodium chloride 0.9 % infusion ( Intravenous Not Given 2/3/24 0450)   HYDROmorphone (STANDARD CONC) (DILAUDID) oral solution 1 mg (has no administration in time range)     Or   HYDROmorphone (DILAUDID) half-tab 1 mg (has no administration in time range)   HYDROmorphone (STANDARD CONC) (DILAUDID) oral solution 2 mg (2 mg Oral $Given 2/3/24 0449)     Or   HYDROmorphone (DILAUDID) tablet 2 mg ( Oral See Alternative 2/3/24 0449)   acetaminophen (TYLENOL) Suppository 650 mg (has no administration in time range)   LORazepam (ATIVAN) injection 1 mg (1 mg Intravenous $Given 2/3/24 0515)     Or   LORazepam (ATIVAN) tablet 1 mg ( Sublingual See Alternative 2/3/24 0515)   ondansetron (ZOFRAN ODT) ODT tab 4 mg (has no administration in time range)     Or   ondansetron (ZOFRAN) injection 4 mg (has no administration in time range)   atropine 1 % ophthalmic solution 2 drop (has no administration in time range)   carboxymethylcellulose PF (REFRESH PLUS) 0.5 % ophthalmic solution 1-2 drop (has no administration in time range)   artificial saliva (BIOTENE MT) solution 1 spray (has no administration in time range)   HYDROmorphone (PF) (DILAUDID) injection 0.5 mg (has no administration in time range)   iopamidol (ISOVUE-370) solution 72 mL (72 mLs Intravenous $Given 2/2/24 7276)   Saline Flush (80 mLs Intravenous $Given 2/2/24 1017)   ondansetron (ZOFRAN) injection 4 mg (4 mg Intravenous $Given 2/2/24 3540)   fentaNYL (PF)  (SUBLIMAZE) 100 MCG/2ML injection (50 mcg  $Given 2/3/24 0026)   fentaNYL (PF) (SUBLIMAZE) injection 50 mcg (50 mcg Intravenous $Given 2/3/24 0129)   LORazepam (ATIVAN) injection 0.25 mg (0.25 mg Intravenous $Given 2/3/24 0134)   ondansetron (ZOFRAN) 2 MG/ML injection (  $Given 2/3/24 0137)        Assessments:  2340 I obtained history and examined the patient as noted above.   2350 I rechecked and updated the patient.   2353 I spoke with radiology   0005 I spoke with Dr. Maldonado  0010 I rechecked and updated the patient.   0015 I spoke with Dr. Maldonado   0029 I rechecked and updated the patient.   0030 I rechecked and updated the patient.   0032 I rechecked and updated the patient.   0038 I rechecked and updated the patient.   0040 I spoke with Dr. Maldonado in person  0045 I rechecked and updated the patient.   0058 I rechecked and updated the patient.   0105 I spoke with Dr. Gomez.   0115 I rechecked and updated the patient.   0127 I spoke with Dr. Boyer at the bedside with patient  0127 I spoke with Dr. Maldonado  0144 I spoke with Dr. Sewell, hospitalist     Independent Interpretation (X-rays, CTs, rhythm strip):  I went to CT with the patient and reviewed images in real-time.  The patient has a mass of aortic aneurysm with rupture and extravasation into the left thorax causing left-sided hemothorax.  This is new compared to her study from 2 days ago which I also reviewed.    Consultations/Discussion of Management or Tests:  Discussed case at length with vascular surgery team.  Discussed with the hospitalist service for admission.     Social Determinants of Health affecting care:  Healthcare Access     Disposition:  The patient was admitted to the hospital under the care of Dr. Xiong.     Impression & Plan        Medical Decision Making:  Maribel Yang is a 73 year old female who presents to the ED in obvious distress with significant shortness of breath and pain.  She stated that she had an  aneurysm and thought that this may be what her pain is coming from.  I reviewed her records and noted within last few days she had a CT scan which showed a markedly enlarged aorta, greater than 13 cm which had doubled in size since her scan 1 year ago.  She was being referred to [facility] due to the complexity of her case including a prior graft with poor surgical options.  I contacted the vascular surgeon immediately upon reviewing her images from a few days ago given her pain and appearance.  Unfortunately today her pain became significantly worse and an emergent CT was obtained.  She is a renal transplant patient, however, given her recent imaging I felt that a contrasted study was needed.  Her CT showed rupture of her aortic aneurysm with extravasation into the thorax.  I reviewed the images in CT and called the vascular surgeon back immediately after reviewing those due to her active extravasation from her thoracic aneurysm.  She has had significant shortness of breath in the emergency department and required high flow oxygen.  Her shortness of breath is most likely due to her hemothorax.  A chest tube would not be indicated as she is actively bleeding into her thorax from her aorta.  She initially had low blood pressures and these were discussed with the vascular surgeon.  They recommended permissive hypotension into systolics of the 70s provided her mentation was normal.  A massive transfusion protocol was ordered and blood products brought to the bedside though these ended up not being given.  Her blood pressure increased and I discussed starting antiimpulse therapy with the vascular surgery team.,  However we were quite concerned for a precipitous decrease or cuff error and were planning to await starting medications until after an art line was placed.  After further review of the imaging the vascular surgery fellow and attending deemed that no surgical options were available to the patient. Unfortunately, she  will die from her ruptured aortic aneurysm.  Medications were given to the patient both for pain and air hunger.  Numerous conversations were had with the patient and her  by myself as well as the vascular surgery team.  The patient and her family understand that this is a terminal illness which we are unable to ameliorate.  She has transitioned her goals to comfort and will be admitted to the hospitalist service for pain management and other palliative needs.    Critical Care time:  was 125 minutes for this patient excluding procedures.    Diagnosis:    ICD-10-CM    1. Ruptured aneurysm of descending thoracic aorta (H)  I71.13            Scribe Disclosure:  Rozina VARGHESE, am serving as a scribe at 11:51 PM on 2/2/2024 to document services personally performed by Cheryl Hart MD based on my observations and the provider's statements to me.  2/2/2024   Cheryl Hart MD Taxman, Karah M, MD  02/03/24 0652

## 2024-02-03 NOTE — ED NOTES
Patient to CT with RN x2 & EDT.  Unable to lay back flat.  Reassurance given.  BP 80/56.  O2 77% in 4 lt nasal cannula.  States feels SOB when laying back..  Oxymask applied.  15 Lt.  O2 95%.    Dr Hart in CT to assist.

## 2024-02-03 NOTE — ED TRIAGE NOTES
EMS arrival:    Patient from home.  Abdominal pain.  Sudden onset, severe tearing pain.  Similar to previous AAA.  Started at 10pm.    BP 86/70s initial BP.  Then 110/  Fluid bolus.   4mg zofran.  100 mcg fentanyl.     On O2 nasal cannula all the time.  4 Lt.  SOB worse today.

## 2024-02-03 NOTE — PROVIDER NOTIFICATION
Updated frequency of PRN IV dilaudid for comfort care  Updated O2 to include NC, Oxygen mask for comfort.    Tad Maddox MD  6:44 AM

## 2024-02-03 NOTE — DISCHARGE SUMMARY
[facility]    Death Summary - Hospitalist Service     Date of Admission:  2/2/2024  Date of Death: 2/3/2024  Discharging Provider: Maritza Ramsay MD    Discharge Diagnoses   Ruptured Thoracic Aortic Aneurysm  Acute on Chronic Hypoxic Respiratory failure 2/2 Hemithorax    Cause of death: Ruptured thoracic aortic aneurysm.    Hospital Course    73F hx of HTN, Inferior STEMI s/p RCA stent (4/7/21), HFrEF (30-35%), PAD, AAA with prior fem-fem bypass s/p EVAR (4/6/21), tobacco use, COPD, SCC of R lung s/p SBRT (2014), anal canal carcinoma s/p chemoradiation (2018), ESRD s/p LDKT (2004), chronic anemia, history of DVT and osteoporosis presenting with sudden abdominal pain, found to have ruptured aortic aneurysm.      End of Life/Comfort measures 2/2 Ruptured Thoracic Aortic Aneurysm  Acute on Chronic Hypoxic Respiratory failure 2/2 Hemithorax  -- Hx: Complained of abdominal pain at 4am, but resolved with some pain meds that the  is unaware of. Later at dinner, patient had sudden onset tearing abdominal pain causing her to scream. No LOC. No further hx obtained from , and patient could not provide hx as she's obtunted  -- Vitals/Exam: sbp 63/45, Obtunded, R lung crackles in the ED   -- Labs: Significant for Hb 6.4  -- CT aortic survey with contrast: Ruptured thoracic aortic aneurysm, with extravasation from the proximal descending thoracic aorta. Associated small hemomediastinum and moderate left hemothorax.  New occlusion of the celiac artery origin, although the distal artery and branches are reconstituted.   -- Vasc consulted, no intervention given poor prognosis.   --End of life protocols       Chronic Disesases  ``HTN, Inferior STEMI s/p RCA stent (4/7/21), HFrEF (30-35%), PAD, AAA with prior fem-fem bypass s/p EVAR (4/6/21), tobacco use, COPD, SCC of R lung s/p SBRT (2014), anal canal carcinoma s/p chemoradiation (2018), ESRD s/p LDKT (2004), chronic anemia, history of DVT and  osteoporosis.   --No acute intervention      Maritza Ramsay MD  [facility]  ______________________________________________________________________      Significant Results and Procedures   Most Recent 3 CBC's:  Recent Labs   Lab Test 02/02/24  2334 01/06/24  1011 12/09/23  1134   WBC 12.1* 7.2 9.8   HGB 6.4* 8.1* 8.2*   MCV 87 84 87    219 272     Most Recent 3 BMP's:  Recent Labs   Lab Test 02/02/24  2334 01/06/24  1011 12/09/23  1134    134* 137   POTASSIUM 5.3 4.6 4.7   CHLORIDE 101 96* 99   CO2 27 26 29   BUN 66.7* 70.0* 76.6*   CR 2.75* 2.86* 2.94*   ANIONGAP 9 12 9   KAYKAY 8.6* 9.5 9.7   * 109* 138*   ,   Results for orders placed or performed during the hospital encounter of 02/02/24   CT Aortic Survey w Contrast     Value    Radiologist flags Ruptured thoracic aortic aneurysm (AA)    Narrative    EXAM: CT AORTIC SURVEY W CONTRAST  LOCATION: [facility]  DATE: 2/3/2024    INDICATION: Severe abdominal pain. Known abdominal aortic aneurysm  COMPARISON: CT chest 08/21/2023, CTA chest abdomen pelvis 06/06/2023  TECHNIQUE: CT angiogram chest abdomen pelvis during arterial phase of injection of IV contrast. 2D and 3D MIP reconstructions were performed by the CT technologist. Dose reduction techniques were used.   CONTRAST: 72  ml Isovue 370    FINDINGS:   CT ANGIOGRAM CHEST, ABDOMEN, AND PELVIS:   Pulmonary Arteries: The pulmonary arteries are well-opacified with contrast. No filling defects are seen to suggest thromboembolism.    Thoracic Aorta: Marked aneurysmal dilatation of the aortic arch and descending thoracic aorta, measuring up to 10.4 x 9.5 cm in the descending thoracic aorta. There is jessa extravasation of contrast from the left lateral aspect of the proximal   descending thoracic aorta (series 7, image 58; series 10, image 74).    Abdominal Aorta: Redemonstrated abdominal aortic aneurysm with aortobiiliac endograft in place. The right  iliac limb is chronically occluded. The excluded aneurysm sac measures up to 4.2 cm in diameter, similar to prior.    Celiac Artery: New occlusion at the origin, with reconstitution of the distal artery and branches.  Superior Mesenteric Artery: Patent.  Right Renal Artery: Chronically occluded.  Left Renal Artery: Chronically occluded.  Inferior Mesenteric Artery: Chronically occluded.    Right Common Iliac Artery: Chronically occluded.  Right External Iliac Artery: Chronically occluded.  Right Internal Iliac Artery: Chronically occluded.  Right Common Femoral Artery: Chronically occluded.  Proximal Right Superficial Femoral Artery: Supplied by a femoral femoral bypass graft, which appears patent.  Proximal Right Deep Femoral Artery: Chronically occluded.    Left Common Iliac Artery: Patent.  Left External Iliac Artery: Patent.  Left Internal Iliac Artery: Patent.  Left Common Femoral Artery: Patent.  Proximal Left Superficial Femoral Artery: Patent.  Proximal Left Deep Femoral Artery: Patent.      LUNGS AND PLEURA: Moderate left pleural effusion which appears somewhat loculated and contains heterogeneous hyperdense fluid, compatible with hemothorax. Small right pleural effusion containing simple fluid. Compressive atelectasis in the left lung.   Moderate upper lobe predominant centrilobular emphysema. Redemonstrated right upper lobe spiculated mass measuring 3.1 x 2.1 cm.    MEDIASTINUM/AXILLAE: Small amount of hemorrhage within the anterior mediastinum. Unchanged small pericardial effusion. No lymphadenopathy.    CORONARY ARTERY CALCIFICATION: Severe.    HEPATOBILIARY: Wedge-shaped hyperenhancement in hepatic segment 4 compared to the remainder of the liver. Gallbladder appears normal. No biliary dilatation.    PANCREAS: Normal.    SPLEEN: Normal.    ADRENAL GLANDS: Normal.    KIDNEYS/BLADDER: Markedly atrophic native kidneys. Left lower quadrant renal transplant. Simple cyst in the transplant kidney measuring  1.4 cm. Additional subcentimeter low-attenuation lesions in the transplant kidney, too small to characterize. No   hydronephrosis. Urinary bladder appears normal.    BOWEL: Moderate colonic diverticulosis. No obstruction or inflammatory change. Appendix not visualized, although no secondary signs of acute appendicitis.    LYMPH NODES: No lymphadenopathy.    PELVIC ORGANS: Unremarkable.    MUSCULOSKELETAL: Multilevel degenerative changes of the spine.      Impression    IMPRESSION:  1.  Ruptured thoracic aortic aneurysm, with extravasation from the proximal descending thoracic aorta. Associated small hemomediastinum and moderate left hemothorax. Small right pleural effusion.    2.  New occlusion of the celiac artery origin, although the distal artery and branches are reconstituted.    3.  Wedge-shaped hyperenhancement of hepatic segment 4, possibly perfusional related to arterial phase imaging, although hepatic infarcts not excluded on single phase CTA.    4.  Unchanged size of abdominal aortic aneurysm with aortobiiliac endograft.    5.  Redemonstrated right upper lobe spiculated 3.1 cm mass, suspicious for malignancy.        [Critical Result: Ruptured thoracic aortic aneurysm]    Finding was identified on 2/3/2024 12:08 AM CST.     Dr. Cheryl Hart was contacted by me on 2/3/2024 12:12 AM CST and verbalized understanding of the critical result.      *Note: Due to a large number of results and/or encounters for the requested time period, some results have not been displayed. A complete set of results can be found in Results Review.       Consultations This Hospital Stay   PALLIATIVE CARE ADULT IP CONSULT  SPIRITUAL HEALTH SERVICES IP CONSULT    Primary Care Physician   Lisa Martinez    Time Spent on this Encounter   I, Maritza Ramsay MD, personally saw the patient today and spent greater than 30 minutes discharging this patient.

## 2024-02-03 NOTE — ED NOTES
Bed: ED04  Expected date: 2/2/24  Expected time: 11:10 PM  Means of arrival: Ambulance  Comments:  HEMS 423 73F Abd pain

## 2024-02-03 NOTE — ED NOTES
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  A delay in compatible RBCs may occur.    Tramadol-Acetaminophen Nausea and Vomiting and Hives    Hydrocodone Nausea and Vomiting and Hives       Patient Story: BIBA from home. C/O sudden Abdominal pain \"tearing pain\" since 2200. Reports being similar to previous AAA. Per EMS BP 86/70s initially, then [id number].    Focused Assessment:  A&Ox4. Respirations fast an labored, skin dry, cold, pale. Guarding her abdomen. Hypotensive and hypoxic at arrival. Hx of heart attack, kidney transplant (20yrs ago), lung Ca on O2 via NC @3L at home.   Results for orders placed or performed during the hospital encounter of 02/02/24   CT Aortic Survey w Contrast     Status: Abnormal   Result Value Ref Range    Radiologist flags Ruptured thoracic aortic aneurysm (AA)     Narrative    EXAM: CT AORTIC SURVEY W CONTRAST  LOCATION: [facility]  DATE: 2/3/2024    INDICATION: Severe abdominal pain. Known abdominal aortic aneurysm  COMPARISON: CT chest 08/21/2023, CTA chest abdomen pelvis 06/06/2023  TECHNIQUE: CT angiogram chest abdomen pelvis during arterial phase of injection of IV contrast. 2D and 3D MIP reconstructions were performed by the CT technologist. Dose reduction techniques were used.   CONTRAST: 72  ml Isovue 370    FINDINGS:   CT ANGIOGRAM CHEST, ABDOMEN, AND PELVIS:   Pulmonary Arteries: The pulmonary arteries are well-opacified with contrast. No filling defects are seen to suggest thromboembolism.    Thoracic Aorta: Marked aneurysmal dilatation of the aortic arch and descending thoracic aorta, " measuring up to 10.4 x 9.5 cm in the descending thoracic aorta. There is jessa extravasation of contrast from the left lateral aspect of the proximal   descending thoracic aorta (series 7, image 58; series 10, image 74).    Abdominal Aorta: Redemonstrated abdominal aortic aneurysm with aortobiiliac endograft in place. The right iliac limb is chronically occluded. The excluded aneurysm sac measures up to 4.2 cm in diameter, similar to prior.    Celiac Artery: New occlusion at the origin, with reconstitution of the distal artery and branches.  Superior Mesenteric Artery: Patent.  Right Renal Artery: Chronically occluded.  Left Renal Artery: Chronically occluded.  Inferior Mesenteric Artery: Chronically occluded.    Right Common Iliac Artery: Chronically occluded.  Right External Iliac Artery: Chronically occluded.  Right Internal Iliac Artery: Chronically occluded.  Right Common Femoral Artery: Chronically occluded.  Proximal Right Superficial Femoral Artery: Supplied by a femoral femoral bypass graft, which appears patent.  Proximal Right Deep Femoral Artery: Chronically occluded.    Left Common Iliac Artery: Patent.  Left External Iliac Artery: Patent.  Left Internal Iliac Artery: Patent.  Left Common Femoral Artery: Patent.  Proximal Left Superficial Femoral Artery: Patent.  Proximal Left Deep Femoral Artery: Patent.      LUNGS AND PLEURA: Moderate left pleural effusion which appears somewhat loculated and contains heterogeneous hyperdense fluid, compatible with hemothorax. Small right pleural effusion containing simple fluid. Compressive atelectasis in the left lung.   Moderate upper lobe predominant centrilobular emphysema. Redemonstrated right upper lobe spiculated mass measuring 3.1 x 2.1 cm.    MEDIASTINUM/AXILLAE: Small amount of hemorrhage within the anterior mediastinum. Unchanged small pericardial effusion. No lymphadenopathy.    CORONARY ARTERY CALCIFICATION: Severe.    HEPATOBILIARY: Wedge-shaped  hyperenhancement in hepatic segment 4 compared to the remainder of the liver. Gallbladder appears normal. No biliary dilatation.    PANCREAS: Normal.    SPLEEN: Normal.    ADRENAL GLANDS: Normal.    KIDNEYS/BLADDER: Markedly atrophic native kidneys. Left lower quadrant renal transplant. Simple cyst in the transplant kidney measuring 1.4 cm. Additional subcentimeter low-attenuation lesions in the transplant kidney, too small to characterize. No   hydronephrosis. Urinary bladder appears normal.    BOWEL: Moderate colonic diverticulosis. No obstruction or inflammatory change. Appendix not visualized, although no secondary signs of acute appendicitis.    LYMPH NODES: No lymphadenopathy.    PELVIC ORGANS: Unremarkable.    MUSCULOSKELETAL: Multilevel degenerative changes of the spine.      Impression    IMPRESSION:  1.  Ruptured thoracic aortic aneurysm, with extravasation from the proximal descending thoracic aorta. Associated small hemomediastinum and moderate left hemothorax. Small right pleural effusion.    2.  New occlusion of the celiac artery origin, although the distal artery and branches are reconstituted.    3.  Wedge-shaped hyperenhancement of hepatic segment 4, possibly perfusional related to arterial phase imaging, although hepatic infarcts not excluded on single phase CTA.    4.  Unchanged size of abdominal aortic aneurysm with aortobiiliac endograft.    5.  Redemonstrated right upper lobe spiculated 3.1 cm mass, suspicious for malignancy.        [Critical Result: Ruptured thoracic aortic aneurysm]    Finding was identified on 2/3/2024 12:08 AM CST.     Dr. Cheryl Hart was contacted by me on 2/3/2024 12:12 AM CST and verbalized understanding of the critical result.    Troponin T, High Sensitivity     Status: Abnormal   Result Value Ref Range    Troponin T, High Sensitivity 66 (H) <=14 ng/L   Comprehensive metabolic panel     Status: Abnormal   Result Value Ref Range    Sodium 137 135 - 145 mmol/L     Potassium 5.3 3.4 - 5.3 mmol/L    Carbon Dioxide (CO2) 27 22 - 29 mmol/L    Anion Gap 9 7 - 15 mmol/L    Urea Nitrogen 66.7 (H) 8.0 - 23.0 mg/dL    Creatinine 2.75 (H) 0.51 - 0.95 mg/dL    GFR Estimate 18 (L) >60 mL/min/1.73m2    Calcium 8.6 (L) 8.8 - 10.2 mg/dL    Chloride 101 98 - 107 mmol/L    Glucose 255 (H) 70 - 99 mg/dL    Alkaline Phosphatase 115 40 - 150 U/L    AST 11 0 - 45 U/L    ALT 8 0 - 50 U/L    Protein Total 5.6 (L) 6.4 - 8.3 g/dL    Albumin 3.1 (L) 3.5 - 5.2 g/dL    Bilirubin Total 0.2 <=1.2 mg/dL   [place] Draw     Status: None    Narrative    The following orders were created for panel order [place] Draw.  Procedure                               Abnormality         Status                     ---------                               -----------         ------                     Extra Blue Top Tube[[id number]]                              Final result               Extra Blood Bank Purple ...[[id number]]                                                   Please view results for these tests on the individual orders.   CBC with platelets and differential     Status: Abnormal   Result Value Ref Range    WBC Count 12.1 (H) 4.0 - 11.0 10e3/uL    RBC Count 2.53 (L) 3.80 - 5.20 10e6/uL    Hemoglobin 6.4 (LL) 11.7 - 15.7 g/dL    Hematocrit 22.1 (L) 35.0 - 47.0 %    MCV 87 78 - 100 fL    MCH 25.3 (L) 26.5 - 33.0 pg    MCHC 29.0 (L) 31.5 - 36.5 g/dL    RDW 16.4 (H) 10.0 - 15.0 %    Platelet Count 244 150 - 450 10e3/uL    % Neutrophils 78 %    % Lymphocytes 10 %    % Monocytes 10 %    % Eosinophils 1 %    % Basophils 0 %    % Immature Granulocytes 1 %    NRBCs per 100 WBC 0 <1 /100    Absolute Neutrophils 9.4 (H) 1.6 - 8.3 10e3/uL    Absolute Lymphocytes 1.2 0.8 - 5.3 10e3/uL    Absolute Monocytes 1.2 0.0 - 1.3 10e3/uL    Absolute Eosinophils 0.1 0.0 - 0.7 10e3/uL    Absolute Basophils 0.0 0.0 - 0.2 10e3/uL    Absolute Immature Granulocytes 0.1 <=0.4 10e3/uL    Absolute NRBCs 0.0 10e3/uL   Extra Blue Top Tube      Status: None   Result Value Ref Range    Hold Specimen [facility]    EKG 12-lead, tracing only     Status: None (Preliminary result)   Result Value Ref Range    Systolic Blood Pressure  mmHg    Diastolic Blood Pressure  mmHg    Ventricular Rate 87 BPM    Atrial Rate 87 BPM    PA Interval 134 ms    QRS Duration 74 ms     ms    QTc 447 ms    P Axis -28 degrees    R AXIS -10 degrees    T Axis 37 degrees    Interpretation ECG       Sinus rhythm  Possible Inferior infarct , age undetermined  Anterior infarct (cited on or before 05-SEP-2023)  Abnormal ECG  When compared with ECG of 23-SEP-2023 16:55,  T wave amplitude has increased in Anterior leads     Adult Type and Screen     Status: None   Result Value Ref Range    ABO/RH(D) A NEG     Antibody Screen Negative Negative    SPECIMEN EXPIRATION DATE [id number]    Prepare red blood cells (unit)     Status: None (Preliminary result)   Result Value Ref Range    ISSUE DATE AND TIME [id number]     Blood Component Type Red Blood Cells     Product Code R2219I99     Unit Status Issued     Unit Number [id number]     UNIT ABO/RH A-     CODING SYSTEM [id number]     UNIT TYPE ISBT 0600    Prepare red blood cells (unit)     Status: None (Preliminary result)   Result Value Ref Range    ISSUE DATE AND TIME [id number]     Blood Component Type Red Blood Cells     Product Code N8010S96     Unit Status Issued     Unit Number [id number]     UNIT ABO/RH A-     CODING SYSTEM [id number]     UNIT TYPE ISBT 0600    CBC with Platelets & Differential     Status: Abnormal    Narrative    The following orders were created for panel order CBC with Platelets & Differential.  Procedure                               Abnormality         Status                     ---------                               -----------         ------                     CBC with platelets and d...[[id number]]  Abnormal            Final result                 Please view results for these tests on the individual  orders.   ABO/Rh type and screen     Status: None    Narrative    The following orders were created for panel order ABO/Rh type and screen.  Procedure                               Abnormality         Status                     ---------                               -----------         ------                     Adult Type and Screen[[id number]]                            Final result                 Please view results for these tests on the individual orders.       Treatments and/or interventions provided: high flow nasal cannula, IVF, blood on standby for massive transfusion if needed.   Medications   iopamidol (ISOVUE-370) solution 72 mL (72 mLs Intravenous $Given 2/2/24 2346)   Saline Flush (80 mLs Intravenous $Given 2/2/24 2347)   ondansetron (ZOFRAN) injection 4 mg (4 mg Intravenous $Given 2/2/24 2350)   fentaNYL (PF) (SUBLIMAZE) 100 MCG/2ML injection (50 mcg  $Given 2/3/24 0026)      Patient's response to treatments and/or interventions: VSS, comfortably restin in stretcher.     To be done/followed up on inpatient unit:  comfort care     Does this patient have any cognitive concerns?:  n/a    Activity level - Baseline/Home:  Walker  Activity Level - Current:   Total Care    Patient's Preferred language: English   Needed?: No    Isolation: None  Infection: Not Applicable  Patient tested for COVID 19 prior to admission: NO  Bariatric?: No    Vital Signs:   Vitals:    02/03/24 0026 02/03/24 0030 02/03/24 0040 02/03/24 0055   BP: (!) 155/121 (!) 153/108 (!) 163/99 (!) 144/97   Pulse:  52 83 81   Resp:       Temp:       TempSrc:       SpO2: 91% (!) 88% 100% 96%       Cardiac Rhythm:     Was the PSS-3 completed:   Yes  What interventions are required if any?    Family Comments:  at bedside  OBS brochure/video discussed/provided to patient/family: N/A  For the majority of the shift this patient's behavior was Green.   Behavioral interventions performed were n/a.    ED NURSE PHONE NUMBER:  [phone]

## 2024-02-03 NOTE — PLAN OF CARE
Goal Outcome Evaluation:    Summary: Presenting with sudden abdominal pain, found to have ruptured aortic aneurysm.   Primary Diagnosis: Ruptured Thoracic Aortic Aneurysm. Comfort Care.     Orientation: Lethargic, restless, occasionally anxious trying to move out of bed. Arouses to voice. Verbal communication very limited, just nods when asked a question.   Aggression Stop Light: Green.   Mobility: Ax2 with lift. T/R q2hrs.   Pain Management: used rFLACC pain scale because pt unable to communicate pain. PRN Dilaudid IV [id number] available. PRN IV Ativan give x1.   Diet: Clear Liquids. Pt unable to take anything PO.   Bowel/Bladder: Incontinent of B/B. No BM. Purewick in place. Not much UOP.   Abnormal Lab/Assessments: deferred d/t comfort care.   Drain/Device/Wound: blanchable redness to buttocks.   Consults: palliative.   D/C Day/Goals/Place: expected to pass in the hospital.     Shift Note: Pt on 5L nasal cannula for comfort measures. Bladder scan pt if no UOP. Page out and request Villavicencio for retention per MD.

## 2024-02-03 NOTE — CONSULTS
VASCULAR SURGERY INPATIENT CONSULTATION / Initial In-Patient visit    VASCULAR SURGEON: Dr Atkins    LOCATION: [facility]    Maribel Yang  Medical Record #:  [id number]  YOB: 1950  Age:  73 year old     Date of Service: 2/2/2024    PRIMARY CARE PROVIDER: Lisa Martinez    Reason for consultation:  Ruptured thoracic aortic aneurysm    IMPRESSION:  71 yo female with hx of CAD s/p coronary stents, CHF, COPD on 3L home O2, CKD with renal transplant, lung cancer, anal cancer, known thoracic aortic aneurysm, and hx of AAA s/p  aorta left uniiliac device in 2021 with left to right fem fem bypass complicated by post op MI andvgraft infection s/p redo fem fem with cryo artery, right femoral pseudoaneurysm in June 2023, chronic celiac stenosis, and significant clinical decompensation who presented with evidence of ruptured thoracic aortic aneurysm with hypotension, severe chest and back pain, and worsening shortness of breath.     Long discussion held with pt and  at bedside. Pt overall very frail at baseline and reported significant decline in overall functional status and ability over the past 6 months. In June 2023 pt was unable to ambulate up stairs (reported crawling up stairs at that time) but today reports continued decline even from this decompensated state. She has had continued respiratory decline in setting of lung cancer and severe Covid-19 infection in 2023. Discussed limited options for repair due to extensive aneurysmal dilation of entire aorta with no clear endovascular option, also with significantly increased complexity in setting of limited vascular access. Discussed that even if there was an endovascular option for repair (which there does not appear to be) pt is extremely high risk of pulmonary failure, renal failure requiring lifelong dialysis, spinal cord ischemia and resultant paralysis, and potential mesenteric ischemia. Also discussed high mortality  for this procedure which is even higher for her in her current clinical state. Unlikely that pt would be able to survive operative intervention even if one was available at this time. Pt and  voiced understanding, requested code status change to DNR and planned for comfort care during hospital admission.     RECOMMENDATION:  No vascular surgical intervention planned due to pt clinical status and overall complexity of aortic disease, no options for repair. Code status change to DNR per pt wishes. Recommend discussion with palliative care and transition to comfort care measures at this time.     Discussed with Dr Hart (ED team) at bedside with pt.     HPI:  Maribel Yang is a 73 year old female who was seen today in consultation for rupture thoracic aortic aneurysm. Pt has complicated past medical history as noted above with known TAA who presented with worsening chest pain and shortness of breath over the past 24 hours. She noted severe pain in abdomen radiating up to chest and increased oxygen requirements at home. Pt presented to ED due to known TAA with concern for complications. She was noted to be hypotensive to SBP 80s on admission, no transfusion due to permissive hypotension in setting of aneurysm rupture. Pt denied any recent MI,nausea, or syncopal episodes.      at bedside. Pt does note that over the past 6 months she has continued to have significant clinical decline. She is able to care for herself at home but can not ambulate up stairs or freely around home. She requires 3L of home oxygen and is largely dependent on her  to care for her.     PHH:    Past Medical History:   Diagnosis Date    Abnormal coagulation profile     p [id number]>A heterozygote     Age-related osteoporosis without current pathological fracture 06/22/2019    Anemia     Antiplatelet or antithrombotic long-term use     ASCUS with positive high risk HPV 2007, 2015    + HPV 56, 54,& 6, colp - TAL III, Leep =TAL II  "   Basal cell carcinoma     Congestive heart failure, unspecified HF chronicity, unspecified heart failure type (H) 06/22/2021    COPD (chronic obstructive pulmonary disease) (H)     Depressive disorder 07/2015    Heart attack (H)     History of blood transfusion     Hypertension     Immunosuppressed status (H24)     due meds    Kidney replaced by transplant 09/2004    Living donor recipient,  Rejection 7/2005    LSIL (low grade squamous intraepithelial lesion) on Pap smear 04/2013    +HPV 33 or 45, 61      PAD (peripheral artery disease) (H24)     PONV (postoperative nausea and vomiting)     Squamous cell lung cancer (H)     Thrombosis of leg 1967    Unspecified disorder of kidney and ureter     X-linked dominant Alport's syndrome.              ALLERGIES:     Allergies   Allergen Reactions    Blood Transfusion Related (Informational Only) Other (See Comments)     Patient has a history of a clinically significant antibody against RBC antigens.  A delay in compatible RBCs may occur.    Tramadol-Acetaminophen Nausea and Vomiting and Hives    Hydrocodone Nausea and Vomiting and Hives        MEDS:  ASA, plavix, statin, COPD inhalers, imdur, metropolol, tacrolimus     SOCIAL HABITS:  former smoker    REVIEW OF SYSTEMS:    A 12 point ROS was reviewed and except for what is listed in the HPI above, all others are negative    PE:    Vital signs:  Temp: 97.8  F (36.6  C) Temp src: Temporal BP: (!) 63/45 Pulse: 82   Resp: 20 SpO2: 100 % O2 Device: High Flow Nasal Cannula (HFNC) Oxygen Delivery: 50 LPM      Estimated body mass index is 16.44 kg/m  as calculated from the following:    Height as of 11/28/23: 1.549 m (5' 1\").    Weight as of 1/24/24: 39.5 kg (87 lb).       Wt Readings from Last 1 Encounters:   01/24/24 39.5 kg (87 lb)     There is no height or weight on file to calculate BMI.    EXAM:  GENERAL: Weak, frail, pale female, very thin, leaning forward in bed, appears very fatigued but awake and alert, responding " appropriately to conversation and discussion  CARDIAC:  RRR  CHEST/LUNG:  increased work of breathing on high flow nasal cannula  MUSCULOSKELETAL: Grossly normal, moving all extremities  HEME/LYMPH: No lymphedema  NEUROLOGIC: Focally intact, Alert and oriented x 3.   PSYCH: appropriate affect  INTEGUMENT: No open lesions or ulcers  VASCULAR: Deferred        DIAGNOSTIC STUDIES:     Images:  CT Aortic Survey w Contrast  Ruptured thoracic aortic aneurysm with active extravasation and new hemothorax on left. Evidence of previous aortic uni iliac EVAR with left to right fem fem bypass, patent. Lung mass concerning for malignancy.         LABS:      Cr 2.75  Albumin 3.1  Hgb 6.4      Total time spent 45  minutes face to face with patient with more than 50% time spent in counseling and coordination of care.    DO AMARI [facility] Vascular Surgery

## 2024-02-03 NOTE — PROGRESS NOTES
RECEIVING UNIT ED HANDOFF REVIEW    ED Nurse Handoff Report was reviewed by: Arin Gonzalez RN on February 3, 2024 at 2:20 AM

## 2024-02-03 NOTE — H&P
Assessment/Plan    73F hx of HTN, Inferior STEMI s/p RCA stent (4/7/21), HFrEF (30-35%), PAD, AAA with prior fem-fem bypass s/p EVAR (4/6/21), tobacco use, COPD, SCC of R lung s/p SBRT (2014), anal canal carcinoma s/p chemoradiation (2018), ESRD s/p LDKT (2004), chronic anemia, history of DVT and osteoporosis presenting with sudden abdominal pain, found to have ruptured aortic aneurysm.     End of Life/Comfort measures 2/2 Ruptured Thoracic Aortic Aneurysm  Acute on Chronic Hypoxic Respiratory failure 2/2 Hemithorax  ``Hx: Complained of abdominal pain at 4am, but resolved with some pain meds that the  is unaware of. Later at dinner, patient had sudden onset tearing abdominal pain causing her to scream. No LOC. No further hx obtained from , and patient could not provide hx as she's obtunted  ``Vitals/Exam: sbp 63/45, Obtunded, R lung crackles  ``Labs: Hb 6.4  ``Imaging: Ruptured thoracic aortic aneurysm, with extravasation from the proximal descending thoracic aorta. Associated small hemomediastinum and moderate left hemothorax.  New occlusion of the celiac artery origin, although the distal artery and branches are reconstituted.   ``ER Course: Vasc consulted, no intervention given poor progrnosis. Fentanyl 100mcg total, ativan 0.25, zofran  --End of life protocols including PRNs for pain, agitation, secretions ordered  --palliative and spiritual consults in  --advancible diet  --TVO IVF  --HFNC    Chronic Disesases  ``HTN, Inferior STEMI s/p RCA stent (4/7/21), HFrEF (30-35%), PAD, AAA with prior fem-fem bypass s/p EVAR (4/6/21), tobacco use, COPD, SCC of R lung s/p SBRT (2014), anal canal carcinoma s/p chemoradiation (2018), ESRD s/p LDKT (2004), chronic anemia, history of DVT and osteoporosis.   --No acute intervention    Supportive Care  DVT ppx: none  Diet:  advancible  Pain Control: PRN dilaudid  Lines/Catheters: IV  TTE/Dopplers: none  Contact Precautions: none   PT/OT/Social/Wound/Palliative  Consults: palliative  Code Status: No CPR- Do NOT Intubate     History of Present Illness  Subjective:  Complained of abdominal pain at 4am, but resolved with some pain meds that the  is unaware of. Later at dinner, patient had sudden onset tearing abdominal pain causing her to scream. No LOC. No further hx obtained from , and patient could not provide hx as she's obtunted    ROS: 10 point ROS neg other than the symptoms noted above in the HPI.     Physical Exam  BP (!) 63/45   Pulse 82   Temp 97.8  F (36.6  C) (Temporal)   Resp 20   SpO2 100%     Constitutional: Obtunded  HEENT: Normocephalic, no scleral icterus  Abdomen: soft, no distention/tenderness/guarding  Lungs:R sided crackles  Heart: Regular s1s2, no evidence of murmurs  Extremities/Neuro:could not be assessed  Skin: No obvious signs of skin breakdown  Psychiatric: culud not be assessed    I have personally spent 78 minutes total time today in preparing to see the patient (eg, review of tests), obtaining and/or reviewing separately obtained history, performing a medically appropriate examination and/or evaluation, counseling and educating the patient/family/caregiver, ordering medications, tests, or procedures, referring and communicating with other health care professionals, documenting clinical information in the electronic or other health record, independently interpreting results and communicating results to the patient/ family/caregiver and care coordination.    EMR History    Past Medical Hx:   Past Medical History:   Diagnosis Date    Abnormal coagulation profile     p [id number] heterozygote     Age-related osteoporosis without current pathological fracture 06/22/2019    Anemia     Antiplatelet or antithrombotic long-term use     ASCUS with positive high risk HPV 2007, 2015    + HPV 56, 54,& 6, colp - TAL III, Leep =TAL II    Basal cell carcinoma     Congestive heart failure, unspecified HF chronicity, unspecified heart failure type  (H) 2021    COPD (chronic obstructive pulmonary disease) (H)     Depressive disorder 2015    Heart attack (H)     History of blood transfusion     Hypertension     Immunosuppressed status (H24)     due meds    Kidney replaced by transplant 2004    Living donor recipient,  Rejection 2005    LSIL (low grade squamous intraepithelial lesion) on Pap smear 2013    +HPV 33 or 45, 61      PAD (peripheral artery disease) (H24)     PONV (postoperative nausea and vomiting)     Squamous cell lung cancer (H)     Thrombosis of leg 1967    Unspecified disorder of kidney and ureter     X-linked dominant Alport's syndrome.        Home Medications: Present in Med Rec    Allergies:   Allergies   Allergen Reactions    Blood Transfusion Related (Informational Only) Other (See Comments)     Patient has a history of a clinically significant antibody against RBC antigens.  A delay in compatible RBCs may occur.    Tramadol-Acetaminophen Nausea and Vomiting and Hives    Hydrocodone Nausea and Vomiting and Hives        Pertinent Family Hx:   Family History   Problem Relation Age of Onset    Diabetes Father     Alcohol/Drug Father     Arthritis Father     Hypertension Father     Lipids Father         high cholesterol    Arthritis Mother     Diabetes Mother     Depression Mother     Heart Disease Mother     Neurologic Disorder Mother     Obesity Mother     Psychotic Disorder Mother     Thyroid Disease Mother     Hypertension Mother     Gynecology Sister         Precancerous cell removal from cervix at age 45    Depression Sister     Allergies Sister     Alcohol/Drug Sister     Neurologic Disorder Sister     Cerebrovascular Disease Paternal Grandmother     Diabetes Paternal Grandmother     Alcohol/Drug Son     Colon Polyps Sister     Breast Cancer Niece     Other Cancer Sister         Cervical    Obesity Sister     Depression Sister     Substance Abuse Son     Substance Abuse Sister             Depression Sister      Asthma Other     Colon Cancer No family hx of     Crohn's Disease No family hx of     Ulcerative Colitis No family hx of     Melanoma No family hx of     Skin Cancer No family hx of         Surgical Hx:   Past Surgical History:   Procedure Laterality Date    BIOPSY      Kidney, Lung, Breast    BIOPSY ANAL N/A 03/14/2018    Procedure: BIOPSY ANAL;;  Surgeon: Shabbir Leo MD;  [facility]: [facility]    BYPASS GRAFT FEMORAL FEMORAL Bilateral 04/25/2021    Procedure: Axplantation infected graft, femoral to femoral bypass with cadaveric artery, bilateral SATORIUS MUSCLE FLAP CREATION, evacuation of absece, vacuum closure;  Surgeon: Raven Lewis MD;  Location: [facility]    COLONOSCOPY      COLONOSCOPY N/A 08/09/2017    Procedure: COMBINED COLONOSCOPY, SINGLE OR MULTIPLE BIOPSY/POLYPECTOMY BY BIOPSY;;  Surgeon: Sushil Hyatt MD;  Location: [facility]    COLONOSCOPY N/A 7/28/2022    Procedure: COLONOSCOPY, WITH BIOPSY;  Surgeon: Moise Corcoran MD;  Location:  GI    COLPOSCOPY,LOOP ELECTRD CERVIX EXCIS  03/11/2008    TAL II    CONIZATION LEEP  07/17/2013    Procedure: CONIZATION LEEP;;  Surgeon: Liliana Renteria MD;  [facility]    CONIZATION LEEP N/A 08/17/2016    Procedure: CONIZATION LEEP;  Surgeon: Liliana Renteria MD;  [facility]    CV CORONARY ANGIOGRAM N/A 04/06/2021    Procedure: CV CORONARY ANGIOGRAM;  Surgeon: Sincere Rosas MD;  Location:  [facility]    CV CORONARY ANGIOGRAM N/A 04/25/2021    Procedure: Coronary Angiogram;  Surgeon: Sincere Rosas MD;  Location:  [facility]    CV PCI STENT DRUG ELUTING N/A 04/06/2021    Procedure: Percutaneous Coronary Intervention Stent Drug Eluting;  Surgeon: Sincere Rosas MD;  Location:  [facility]    ENDOVASCULAR REPAIR ANEURYSM AORTOILIAC Bilateral 04/06/2021    Procedure: Endovascular Abdominal Aortic Aneurysm Repair with Aortouniiliac Device and Femoral-Femoral  Artery Bypass with [id number] Artegraft;  Surgeon: Abena Ferrara MD;  [facility]    ESOPHAGOSCOPY, GASTROSCOPY, DUODENOSCOPY (EGD), COMBINED N/A 2/2/2023    Procedure: ESOPHAGOGASTRODUODENOSCOPY, WITH BIOPSY;  Surgeon: Yazan Heredia MD;  Location: [facility]    EXAM UNDER ANESTHESIA ANUS  07/15/2014    Procedure: EXAM UNDER ANESTHESIA ANUS;  Surgeon: Radha Musa MD;  [facility]    EXAM UNDER ANESTHESIA ANUS N/A 03/14/2018    Procedure: EXAM UNDER ANESTHESIA ANUS;  Anal Exam Under Anesthesia With Excision of anal lesion, proctoscopy;  Surgeon: Shabbir Leo MD;  [facility]    EYE SURGERY      GENITOURINARY SURGERY      IR OR ANGIOGRAM  04/06/2021    IRRIGATION AND DEBRIDEMENT GROIN Right 04/24/2021    Procedure: IRRIGATION AND DEBRIDEMENT, INGUINAL REGION, I & D PF RIGHT GROIN;  Surgeon: Raven Lewis MD;  [facility]    IRRIGATION AND DEBRIDEMENT GROIN N/A 04/26/2021    Procedure: IRRIGATION AND DEBRIDEMENT, INGUINAL REGION, PLACEMENT OF VERAFLO WOUND VAC, WOUND WASHOUT,;  Surgeon: Raven Lewis MD;  [facility]    LASER CO2 EXCISE VULVA WIDE LOCAL  07/15/2014    Procedure: LASER CO2 EXCISE VULVA WIDE LOCAL;  Surgeon: Liliana Renteria MD;  [facility]    LASER CO2 VAGINA  07/17/2013    Procedure: LASER CO2 VAGINA;;  Surgeon: Liliana Renteria MD;  [facility]    LASER CO2 VAGINA N/A 09/25/2018    Procedure: LASER CO2 VAGINA;  Exam Under Anesthesia, CO2 Laser Ablation of Upper Vagina and Cervix;  Surgeon: Pati Garcia MD;  [facility]    MICROSCOPY ANAL  07/17/2013    Procedure: MICROSCOPY ANAL;  Anal Microscopy,  EUA vagina,Colposcopy Of Vagina And Vulva, Vaginal Biopsies, Omniguide Co2 Laser To Vagina and vulva, Loop Electrosurgical Excision Procedure To Cervix;  Surgeon: Radha Musa MD;  [facility]    MICROSCOPY ANAL  07/15/2014    Procedure: MICROSCOPY ANAL;  Surgeon: Radha Musa MD;  Location: [facility]    [facility]  DOUBLE LUMEN PLACEMENT Right 04/30/2021    39cm, Basilic vein    PSEUDOANEURYSM REPAIR Right 6/27/2023    Procedure: RIGHT FEMORAL TO PROFUNDA FEMORIS ARTERY BYPASS WITH cadaveric femoral-popliteal artery, REPAIR OF RIGHT GROIN PSEUDOANEURYSM;  Surgeon: Abena Ferrara MD;  [facility]    TRANSESOPHAGEAL ECHOCARDIOGRAM INTRAOPERATIVE N/A 04/28/2021    Procedure: ECHOCARDIOGRAM, TRANSESOPHAGEAL, INTRAOPERATIVE;  Surgeon: GENERIC ANESTHESIA PROVIDER;  Location: [facility]    VASCULAR SURGERY      Thrombectomy    [facility] NONSPECIFIC PROCEDURE      Thrombectomy    [facility] NONSPECIFIC PROCEDURE  1955 and 1959    Bilater eye surgery - correction for crossed eyes    [facility] NONSPECIFIC PROCEDURE  1998    oopherectomy L    [facility] NONSPECIFIC PROCEDURE  1967    open kidney biopsy - L    [facility] TRANSPLANTATION OF KIDNEY  09/2004    recipient -- done at [facility]        Substance Hx:   History   Drug Use Unknown     Comment: occasional use   ,  Social History    Substance and Sexual Activity      Alcohol use: Not Currently        Comment: rarely  ,   History   Smoking Status    Former    Packs/day: 0.30    Years: 35.00    Types: Cigarettes    Start date: 1/1/1967    Quit date: 3/1/2021   Smokeless Tobacco    Never   ,   History   Sexual Activity    Sexual activity: Not Currently    Partners: Male    Birth control/ protection: Abstinence, Post-menopausal     Comment: 25 years of marriage        Imaging/Labs    Imaging: CT Aortic Survey w Contrast    Result Date: 2/3/2024  EXAM: CT AORTIC SURVEY W CONTRAST LOCATION: [facility] DATE: 2/3/2024 INDICATION: Severe abdominal pain. Known abdominal aortic aneurysm COMPARISON: CT chest 08/21/2023, CTA chest abdomen pelvis 06/06/2023 TECHNIQUE: CT angiogram chest abdomen pelvis during arterial phase of injection of IV contrast. 2D and 3D MIP reconstructions were performed by the CT technologist. Dose reduction techniques were used. CONTRAST: 72  ml Isovue 370 FINDINGS: CT ANGIOGRAM  CHEST, ABDOMEN, AND PELVIS: Pulmonary Arteries: The pulmonary arteries are well-opacified with contrast. No filling defects are seen to suggest thromboembolism. Thoracic Aorta: Marked aneurysmal dilatation of the aortic arch and descending thoracic aorta, measuring up to 10.4 x 9.5 cm in the descending thoracic aorta. There is jessa extravasation of contrast from the left lateral aspect of the proximal descending thoracic aorta (series 7, image 58; series 10, image 74). Abdominal Aorta: Redemonstrated abdominal aortic aneurysm with aortobiiliac endograft in place. The right iliac limb is chronically occluded. The excluded aneurysm sac measures up to 4.2 cm in diameter, similar to prior. Celiac Artery: New occlusion at the origin, with reconstitution of the distal artery and branches. Superior Mesenteric Artery: Patent. Right Renal Artery: Chronically occluded. Left Renal Artery: Chronically occluded. Inferior Mesenteric Artery: Chronically occluded. Right Common Iliac Artery: Chronically occluded. Right External Iliac Artery: Chronically occluded. Right Internal Iliac Artery: Chronically occluded. Right Common Femoral Artery: Chronically occluded. Proximal Right Superficial Femoral Artery: Supplied by a femoral femoral bypass graft, which appears patent. Proximal Right Deep Femoral Artery: Chronically occluded. Left Common Iliac Artery: Patent. Left External Iliac Artery: Patent. Left Internal Iliac Artery: Patent. Left Common Femoral Artery: Patent. Proximal Left Superficial Femoral Artery: Patent. Proximal Left Deep Femoral Artery: Patent. LUNGS AND PLEURA: Moderate left pleural effusion which appears somewhat loculated and contains heterogeneous hyperdense fluid, compatible with hemothorax. Small right pleural effusion containing simple fluid. Compressive atelectasis in the left lung. Moderate upper lobe predominant centrilobular emphysema. Redemonstrated right upper lobe spiculated mass measuring 3.1 x 2.1 cm.  MEDIASTINUM/AXILLAE: Small amount of hemorrhage within the anterior mediastinum. Unchanged small pericardial effusion. No lymphadenopathy. CORONARY ARTERY CALCIFICATION: Severe. HEPATOBILIARY: Wedge-shaped hyperenhancement in hepatic segment 4 compared to the remainder of the liver. Gallbladder appears normal. No biliary dilatation. PANCREAS: Normal. SPLEEN: Normal. ADRENAL GLANDS: Normal. KIDNEYS/BLADDER: Markedly atrophic native kidneys. Left lower quadrant renal transplant. Simple cyst in the transplant kidney measuring 1.4 cm. Additional subcentimeter low-attenuation lesions in the transplant kidney, too small to characterize. No hydronephrosis. Urinary bladder appears normal. BOWEL: Moderate colonic diverticulosis. No obstruction or inflammatory change. Appendix not visualized, although no secondary signs of acute appendicitis. LYMPH NODES: No lymphadenopathy. PELVIC ORGANS: Unremarkable. MUSCULOSKELETAL: Multilevel degenerative changes of the spine.     IMPRESSION: 1.  Ruptured thoracic aortic aneurysm, with extravasation from the proximal descending thoracic aorta. Associated small hemomediastinum and moderate left hemothorax. Small right pleural effusion. 2.  New occlusion of the celiac artery origin, although the distal artery and branches are reconstituted. 3.  Wedge-shaped hyperenhancement of hepatic segment 4, possibly perfusional related to arterial phase imaging, although hepatic infarcts not excluded on single phase CTA. 4.  Unchanged size of abdominal aortic aneurysm with aortobiiliac endograft. 5.  Redemonstrated right upper lobe spiculated 3.1 cm mass, suspicious for malignancy. [Critical Result: Ruptured thoracic aortic aneurysm] Finding was identified on 2/3/2024 12:08 AM CST. Dr. Cheryl Hart was contacted by me on 2/3/2024 12:12 AM CST and verbalized understanding of the critical result.        Recent Labs   Lab Test 02/02/24  2334 01/06/24  1011    134*   POTASSIUM 5.3 4.6   CHLORIDE 101  96*   CO2 27 26   ANIONGAP 9 12   * 109*   BUN 66.7* 70.0*   CR 2.75* 2.86*   KAYKAY 8.6* 9.5     CBC RESULTS:   Recent Labs   Lab Test 02/02/24  2334   WBC 12.1*   RBC 2.53*   HGB 6.4*   HCT 22.1*   MCV 87   MCH 25.3*   MCHC 29.0*   RDW 16.4*         Liver Function Studies -   Recent Labs   Lab Test 02/02/24  2334   PROTTOTAL 5.6*   ALBUMIN 3.1*   BILITOTAL 0.2   ALKPHOS 115   AST 11   ALT 8      Troponin I ES   Date Value Ref Range Status   06/22/2021 0.015 0.000 - 0.045 ug/L Final     Comment:     The 99th percentile for upper reference range is 0.045 ug/L.  Troponin values   in the range of 0.045 - 0.120 ug/L may be associated with risks of adverse   clinical events.

## 2024-02-03 NOTE — PROGRESS NOTES
Patient time of death was 1345. Family notified. Belongings (clothing) sent with family. Autopsy requested (no). Death record completed. [place] [facility] [place].        **Family will need help from ANS coordinating  home.

## 2024-02-04 LAB
ATRIAL RATE - MUSE: 87 BPM
DIASTOLIC BLOOD PRESSURE - MUSE: NORMAL MMHG
INTERPRETATION ECG - MUSE: NORMAL
P AXIS - MUSE: -28 DEGREES
PR INTERVAL - MUSE: 134 MS
QRS DURATION - MUSE: 74 MS
QT - MUSE: 372 MS
QTC - MUSE: 447 MS
R AXIS - MUSE: -10 DEGREES
SYSTOLIC BLOOD PRESSURE - MUSE: NORMAL MMHG
T AXIS - MUSE: 37 DEGREES
VENTRICULAR RATE- MUSE: 87 BPM

## 2024-02-05 ENCOUNTER — TELEPHONE (OUTPATIENT)
Dept: FAMILY MEDICINE | Facility: CLINIC | Age: 74
End: 2024-02-05
Payer: COMMERCIAL

## 2024-02-05 RX ORDER — ISOSORBIDE MONONITRATE 60 MG/1
60 TABLET, EXTENDED RELEASE ORAL DAILY
Qty: 90 TABLET | Refills: 2 | OUTPATIENT
Start: 2024-02-05

## 2024-02-05 NOTE — TELEPHONE ENCOUNTER
General Call    Contacts         Type Contact Phone/Fax    02/05/2024 06:13 AM CST Phone (Incoming) BERNARDO MERINO (Emergency Contact) [phone]          Reason for Call:  patient death    What are your questions or concerns:  patient passed away Saturday. Patient just wanted to let Dr Martinez know.     Date of last appointment with provider: 11-27-23    Okay to leave a detailed message?: Yes at Other phone number:  Bernardo [phone]

## 2024-02-06 ENCOUNTER — DOCUMENTATION ONLY (OUTPATIENT)
Dept: TRANSPLANT | Facility: CLINIC | Age: 74
End: 2024-02-06
Payer: COMMERCIAL

## 2024-02-06 ENCOUNTER — POST MORTEM DOCUMENTATION (OUTPATIENT)
Dept: TRANSPLANT | Facility: CLINIC | Age: 74
End: 2024-02-06
Payer: COMMERCIAL

## 2024-03-20 NOTE — CONSULTS
Care Management Initial Consult    General Information  Assessment completed with: PatientMaribel  Type of CM/SW Visit: Initial Assessment  Primary Care Provider verified and updated as needed: Yes (Lisa Martinez, )   Readmission within the last 30 days: no previous admission in last 30 days   Advance Care Planning:    no ACP documents on file in EPIC    Communication Assessment  Patient's communication style: spoken language (English or Bilingual)    Hearing Difficulty or Deaf: no     Cognitive  Cognitive/Neuro/Behavioral: WDL     Arousal Level: opens eyes spontaneously                Living Environment:   People in home: spouse  BERNARDO  [phone]  Current living Arrangements: house      Able to return to prior arrangements: yes    Family/Social Support:  Care provided by: self, spouse/significant other  Provides care for: no one  Marital Status:   Support system:   Bernardo       Description of Support System: Supportive, Involved      Current Resources:   Patient receiving home care services: No  Community Resources: Meals on Wheels, Financial/Insurance  Equipment currently used at home: walker, rolling  Supplies currently used at home: Nutritional Supplements    Employment/Financial:  Employment Status: retired      Financial Concerns: No concerns identified (however notes would not want to private pay for TCU)   Finance Comments: active UCARE/UCARE MEDICARE insurance  Does the patient's insurance plan have a 3 day qualifying hospital stay waiver?  No    Lifestyle & Psychosocial Needs:  Social Determinants of Health     Food Insecurity: Not on file   Depression: Not at risk (7/19/2023)    PHQ-2     PHQ-2 Score: 0   Housing Stability: Not on file   Tobacco Use: Medium Risk (9/13/2023)    Patient History     Smoking Tobacco Use: Former     Smokeless Tobacco Use: Never     Passive Exposure: Not on file   Financial Resource Strain: Not on file   Alcohol Use: Not At Risk (5/6/2019)    AUDIT-C      Continue Zoloft   Frequency of Alcohol Consumption: Monthly or less     Average Number of Drinks: 1 or 2     Frequency of Binge Drinking: Less than monthly   Transportation Needs: Not on file   Physical Activity: Not on file   Interpersonal Safety: Not on file   Stress: Not on file   Social Connections: Not on file     Functional Status:  Prior to admission patient needed assistance:   Dependent ADLs:: Transfers, Positioning, Ambulation-walker  Dependent IADLs:: Transportation, Shopping, Laundry, Cleaning, Meal Preparation, Cooking    Mental Health Status:  Mental Health Status: No Current Concerns       Chemical Dependency Status:  Chemical Dependency Status: No Current Concerns           Values/Beliefs:  Spiritual, Cultural Beliefs, Confucianism Practices, Values that affect care: no             Additional Information:  Met with patient in room, introduced self and role in discharge planning. Confirmed the information in the above assessment, please see each section for helpful details. Provided and explained MOON and Observation brochure.  Reviewed therapy assessment and recommendation (below):     Pt below baseline mobility. Lives in house w/ . Lots of stairs to complete. Reports increased difficulty at home since most recent hospitalization and has since been using walker all the time.  has been increasing assistance for ADLs. At this time would recommend DC to TCU d/t increased needs at home, however, Pt adamently refusing. If Pt were to return home would recommend 24/7 assist for mobility and HHPT to improve upon functional strengthening as well as continued use of walker all the time.     Pt/family was provided with the Medicare Compare list for Home Care.  Discussed associated Medicare star ratings to assist with choice for referrals/discharge planning Yes; Education was given to pt/family that star ratings are updated/maintained by Medicare and can be reviewed by visiting www.medicare.gov Yes.  Pt agreeable to  referrals.  [facility] accepted home care referral--orders should be routed to them at discharge.  Reminder to order home care left as sticky note for provider reference.    Pt did identify appointment scheduling assist needs; PCP followup reserved as virtual appointment on 9/29.  Followed up with call to clinic to request a message be sent to pt's care team to try to convert this to in-person appointment, as next schedulable in-person appointment is at the end of October.  AVS can be referenced for when this is converted.    Pt will also need clinic care coordinator referral at discharge.     CM team to continue to follow for discharge planning.    Dana Mcdonald RN, BSN, PHN  [facility]  Inpatient Care Management - FLOAT  Short Stay CM RN Mobile: [phone] daily 7:30-4:00

## 2024-06-27 NOTE — PLAN OF CARE
6B PT: Cancelled PT due to pt request of no therapy after wound vac dressing change. Will reschedule.   negative...

## 2024-10-23 NOTE — NURSING NOTE
The following medication was given:     MEDICATION:  Lidocaine with epinephrine  ROUTE: SQ  SITE: Rectum  DOSE: 1% 200mg per 20mL  LOT #: [id number]  : [employer]  EXPIRATION DATE: 08/19  NDC#: [id number]   Was there drug waste? Yes  Amount of drug waste (mL): 15.  Reason for waste:  Single use vial      Chandni Keenan LPN  February 15, 2018   [No] : patient is not an appropriate candidate for kidney transplantation evaluation

## (undated) DEVICE — SU VICRYL 3-0 SH 27" UND J416H

## (undated) DEVICE — WIPES FOLEY CARE SURESTEP PROVON DFC100

## (undated) DEVICE — DRAPE TIBURON CARDIOVASCULAR PERI-GROIN LF 9154

## (undated) DEVICE — DRSG ADAPTIC 3X16"  6114

## (undated) DEVICE — INTRO SHEATH BRITE TIP 6FRX11CM 401-611M

## (undated) DEVICE — GLOVE PROTEXIS W/NEU-THERA 7.0  2D73TE70

## (undated) DEVICE — GLOVE PROTEXIS BLUE W/NEU-THERA 6.0  2D73EB60

## (undated) DEVICE — TUBING MEDRAD 48" HIGH PRESSURE MX694

## (undated) DEVICE — CLIP HORIZON MED BLUE 002200

## (undated) DEVICE — SOL ADH LIQUID BENZOIN SWAB 0.6ML C1544

## (undated) DEVICE — CATH OMNIFLUSH 5FRX70CM SIZING 13709702

## (undated) DEVICE — SU SILK 2-0 TIE 12X30" A305H

## (undated) DEVICE — SOL WATER IRRIG 1000ML BOTTLE 2F7114

## (undated) DEVICE — DRSG KERLIX 2 1/4"X3YDS ROLL 6720

## (undated) DEVICE — CATH ANGIO ANG GLIDE 5FRX65CM CG507

## (undated) DEVICE — TUBING IV 72"

## (undated) DEVICE — LINEN TOWEL PACK X30 5481

## (undated) DEVICE — DRAPE SLEEVE 599

## (undated) DEVICE — DRAPE IOBAN INCISE 23X17" 6650EZ

## (undated) DEVICE — CATH ANGIO 6FR JL3.5 100CM ST+

## (undated) DEVICE — SYR 50ML LL W/O NDL 309653

## (undated) DEVICE — VESSEL LOOPS RED MINI 24000-01R

## (undated) DEVICE — SU PROLENE 6-0 C-1DA 30" 8706H

## (undated) DEVICE — SU SILK 0 TIE 6X18" A186H

## (undated) DEVICE — SU SILK 3-0 TIE 12X30" A304H

## (undated) DEVICE — GLOVE PROTEXIS MICRO 7.0  2D73PM70

## (undated) DEVICE — SYR 30ML LL W/O NDL 302832

## (undated) DEVICE — SU MONOCRYL 4-0 PS-2 27" UND Y426H

## (undated) DEVICE — SPONGE SURGIFOAM 100 1974

## (undated) DEVICE — DRSG STERI STRIP 1/2X4" R1547

## (undated) DEVICE — LINEN TOWEL PACK X5 5464

## (undated) DEVICE — 0.035IN X 260CM, EMERALD DIAGNOSTIC GUIDEWIRE, FIXED-CORE PTFE COATED, STANDARD, 3MM EXCHANGE J-TIP (EA/1)

## (undated) DEVICE — TUBING PRESSURE 30"

## (undated) DEVICE — SU PROLENE 6-0 BV-1DA 18" 8709H

## (undated) DEVICE — Device

## (undated) DEVICE — LINEN TOWEL PACK X6 WHITE 5487

## (undated) DEVICE — PREP DURAPREP 26ML APL 8630

## (undated) DEVICE — ESU PENCIL SMOKE EVAC W/ROCKER SWITCH 0703-047-000

## (undated) DEVICE — SUCTION IRR STRYKERFLOW II W/TIP 250-070-520

## (undated) DEVICE — VALVE HEMOSTASIS .096" COPILOT MECH 1003331

## (undated) DEVICE — SU PROLENE 5-0 24" BV-1 9702H

## (undated) DEVICE — CATH BALLOON EMERGE 2.0X20MM H7493918920200

## (undated) DEVICE — PITCHER STERILE 1000ML  SSK9004A

## (undated) DEVICE — DRAPE ISOLATION BAG 1003

## (undated) DEVICE — CATH TRAY FOLEY SURESTEP 16FR W/URNE MTR STLK LATEX A303316A

## (undated) DEVICE — DRAPE SHEET REV FOLD 3/4 9349

## (undated) DEVICE — NDL 21GA 1.5"

## (undated) DEVICE — CLIP SPRING FOGARTY SOFTJAW CSOFT6

## (undated) DEVICE — TAPE CLOTH 3" CARDINAL 3TRCL03

## (undated) DEVICE — PREP CHLORAPREP 26ML TINTED ORANGE  260815

## (undated) DEVICE — SURGICEL POWDER ABSORBABLE HEMOSTAT 3GM 3013SP

## (undated) DEVICE — GOWN IMPERVIOUS BREATHABLE SMART XLG 89045

## (undated) DEVICE — FASTENER CATH BALLOON CLAMPX2 STATLOCK 0684-00-493

## (undated) DEVICE — SU ETHILON 3-0 PS-1 18" 1663H

## (undated) DEVICE — SUCTION MANIFOLD DORNOCH ULTRA CART UL-CL500

## (undated) DEVICE — DRAPE LAP NEONATAL 72X113.5"

## (undated) DEVICE — KIT MICRO-INTRODUCER STIFFEN 4FR 7274V

## (undated) DEVICE — ESU ELEC BLADE 2.75" COATED/INSULATED E1455

## (undated) DEVICE — CLIP HORIZON SM RED WIDE SLOT 001201

## (undated) DEVICE — DRSG MEDIPORE 3 1/2X13 3/4" 3573

## (undated) DEVICE — BLADE KNIFE SURG 15 371115

## (undated) DEVICE — VESSEL LOOPS RED MINI 31145710

## (undated) DEVICE — DECANTER VIAL 2006S

## (undated) DEVICE — VESSEL LOOPS YELLOW MAXI 31145694

## (undated) DEVICE — PREP TECHNI-CARE CHLOROXYLENOL 3% 4OZ BOTTLE C222-4ZWO

## (undated) DEVICE — SURGICEL HEMOSTAT 4X8" 1952

## (undated) DEVICE — TUBING SMOKE EVAC 2.2CMX3M SEA3715

## (undated) DEVICE — CATH BALLOON EMERGE 2.5X15MM H7493918915250

## (undated) DEVICE — JELLY LUBRICATING SURGILUBE 2OZ TUBE

## (undated) DEVICE — TAPE CLOTH 2" CARDINAL 3TRCL02

## (undated) DEVICE — ESU PENCIL W/COATED BLADE E2450H

## (undated) DEVICE — INSERT FOGARTY 33MM TRACTION HYDRAJAW HYDRA33

## (undated) DEVICE — SUTURE BOOTS 051003PBX

## (undated) DEVICE — GEL ULTRASOUND AQUASONIC 20GM 01-01

## (undated) DEVICE — DRSG WOUND VAC SPONGE MED BLACK M8275052/5

## (undated) DEVICE — CANISTER WOUND VAC W/GEL 1000ML M8275093/5

## (undated) DEVICE — PACK RECTAL UMMC

## (undated) DEVICE — SU VICRYL 3-0 SH 27" J316H

## (undated) DEVICE — TUBE CULTURE AEROBIC/ANAEROBIC W/O SWABS A.C.T.I.1. 12401

## (undated) DEVICE — BULLDOG SPRING PARALLEL JAW BLACK CPARAL6

## (undated) DEVICE — TUNNELER SHEATH SCANLAN GREEN

## (undated) DEVICE — SU UMBILICAL TAPE .125X30" U11T

## (undated) DEVICE — GLOVE BIOGEL PI MICRO SZ 6.5 48565

## (undated) DEVICE — WIRE GUIDE AMPLATZ SUPER STIFF 0.035"X260CM M001465261

## (undated) DEVICE — SOL NACL 0.9% IRRIG 1000ML BOTTLE 2F7124

## (undated) DEVICE — SUCTION MANIFOLD NEPTUNE 2 SYS 4 PORT 0702-020-000

## (undated) DEVICE — ESU GROUND PAD ADULT W/CORD E7507

## (undated) DEVICE — DECANTER BAG 2002S

## (undated) DEVICE — SU VICRYL 2-0 SH 27" UND J417H

## (undated) DEVICE — SWAB PROCTO OR

## (undated) DEVICE — INTRO SHEATH BRITE TIP 11FRX11CM 401-111M

## (undated) DEVICE — SU PROLENE 6-0 BV-1 DA 24" 8805H

## (undated) DEVICE — SU PROLENE 5-0 C-1DA 36" 8720H

## (undated) DEVICE — TUBING SUCTION 10'X3/16" N510

## (undated) DEVICE — PACK HEART LEFT CUSTOM

## (undated) DEVICE — ESU ELEC BLADE 6" COATED E1450-6

## (undated) DEVICE — INTRO GLIDESHEATH SLENDER 6FR 10X45CM 60-1060

## (undated) DEVICE — CUP AND LID 2PK 2OZ STERILE  SSK9006A

## (undated) DEVICE — NDL COUNTER 20CT 31142493

## (undated) DEVICE — SOL NACL 0.9% 10ML VIAL 0409-4888-02

## (undated) DEVICE — SU SILK 4-0 TIE 12X30" A303H

## (undated) DEVICE — MANIFOLD KIT ANGIO AUTOMATED 014613

## (undated) DEVICE — APPLICATOR COTTON TIP 6"X2 STERILE LF 6012

## (undated) DEVICE — DRAPE SPLIT SHEET 77X108 REINFORCED 29436

## (undated) DEVICE — SLEEVE TR BAND RADIAL COMPRESSION DEVICE 24CM TRB24-REG

## (undated) DEVICE — PAD CHUX UNDERPAD 23X24" 7136

## (undated) DEVICE — GLOVE BIOGEL PI MICRO INDICATOR UNDERGLOVE SZ 6.5 48965

## (undated) DEVICE — SWAB PROCTO 16" 2/PK 32-046

## (undated) DEVICE — SPONGE LAP 18X18" X8435

## (undated) DEVICE — GUIDEWIRE VASC 0.014INX180CM RUNTHROUGH 25-1011

## (undated) DEVICE — DRAPE C-ARM W/STRAPS 42X72" 07-CA104

## (undated) DEVICE — KIT ACCESSORY INTRO INFLATION SYS 20/30 PRIORITY 1000186-115

## (undated) DEVICE — GLOVE PROTEXIS BLUE W/NEU-THERA 7.5  2D73EB75

## (undated) DEVICE — BOWL STERILE 32OZ DYND50320

## (undated) DEVICE — CATH GUIDING BLUE YELLOW PTFE JR4 6FRX100CM 67008200

## (undated) DEVICE — SPONGE KITTNER 30-101

## (undated) DEVICE — GLOVE PROTEXIS MICRO 6.0  2D73PM60

## (undated) DEVICE — GLIDEWIRE TERUMO .035X180CM 1.5,, J-TIP GR3525

## (undated) DEVICE — GLIDEWIRE TERUMO .035X180 ANG STIFF GS3508

## (undated) DEVICE — DRSG GAUZE 4X4" TRAY 6939

## (undated) DEVICE — PANTIES MESH LG/XLG 2PK 706M2

## (undated) DEVICE — KIT HAND CONTROL ACIST 014644 AR-P54

## (undated) DEVICE — DRAPE U SPLIT 74X120" 29440

## (undated) DEVICE — DEVICE MULTI TORQUE TD01

## (undated) DEVICE — SU ETHILON 2-0 FS 18" 664H

## (undated) DEVICE — SU DERMABOND ADVANCED .7ML DNX12

## (undated) DEVICE — CATH TRAY FOLEY SURESTEP 16FR W/URINE MTR STATLK LF A303416A

## (undated) DEVICE — DEVICE HIGH PRESSURE FLOSWITCH FLOW CONTROL M001442010

## (undated) DEVICE — ANOSCOPE PLASTIC CLEAR UNSTERILE 82420

## (undated) DEVICE — LABEL MEDICATION SYSTEM 3303-P

## (undated) DEVICE — CATH BALLOON NC EMERGE 3.50X12MM H7493926712350

## (undated) DEVICE — VESSEL LOOPS DEV-O-LOOP WHITE MINI 31145728

## (undated) DEVICE — SU PDS II 0 TP-1 60" Z991G

## (undated) DEVICE — PREP CHLORAPREP 26ML TINTED HI-LITE ORANGE 930815

## (undated) DEVICE — ESU GROUND PAD ADULT REM W/15' CORD E7507DB

## (undated) DEVICE — GUIDEWIRE TERUMO .035X180 ANG GR3508

## (undated) DEVICE — SU SILK 2-0 SH 30" K833H

## (undated) DEVICE — SYR BULB IRRIG 50ML LATEX FREE 0035280

## (undated) DEVICE — SUCTION IRR SYSTEM W/O TIP INTERPULSE HANDPIECE 0210-100-000

## (undated) DEVICE — KIT CULTURE TRANSPORT SYS A.C.T. II DUAL ANEROBE R124022

## (undated) DEVICE — DRSG TEGADERM 4X4 3/4" 1626W

## (undated) DEVICE — DRAPE GYN/UROLOGY FLUID POUCH TUR 29455

## (undated) DEVICE — ADH SKIN CLOSURE PREMIERPRO EXOFIN 1.0ML 3470

## (undated) DEVICE — SU SILK 0 TIE 6X30" A306H

## (undated) DEVICE — SHEATH INTRODUCER DRYSEAL FLEX W/HYDRO CT 16FRX33CM DSF1633

## (undated) DEVICE — CATH BALLOON RELIANT STENT GRAFT 8FR REL46

## (undated) RX ORDER — LIDOCAINE HYDROCHLORIDE 20 MG/ML
SOLUTION OROPHARYNGEAL
Status: DISPENSED
Start: 2021-04-28

## (undated) RX ORDER — LIDOCAINE HYDROCHLORIDE 20 MG/ML
INJECTION, SOLUTION EPIDURAL; INFILTRATION; INTRACAUDAL; PERINEURAL
Status: DISPENSED
Start: 2018-09-25

## (undated) RX ORDER — FENTANYL CITRATE 50 UG/ML
INJECTION, SOLUTION INTRAMUSCULAR; INTRAVENOUS
Status: DISPENSED
Start: 2021-04-26

## (undated) RX ORDER — HYDROMORPHONE HYDROCHLORIDE 1 MG/ML
INJECTION, SOLUTION INTRAMUSCULAR; INTRAVENOUS; SUBCUTANEOUS
Status: DISPENSED
Start: 2023-01-01

## (undated) RX ORDER — LIDOCAINE HYDROCHLORIDE 20 MG/ML
INJECTION, SOLUTION EPIDURAL; INFILTRATION; INTRACAUDAL; PERINEURAL
Status: DISPENSED
Start: 2021-04-06

## (undated) RX ORDER — PAPAVERINE HYDROCHLORIDE 30 MG/ML
INJECTION INTRAMUSCULAR; INTRAVENOUS
Status: DISPENSED
Start: 2023-01-01

## (undated) RX ORDER — LIDOCAINE HYDROCHLORIDE 10 MG/ML
INJECTION, SOLUTION EPIDURAL; INFILTRATION; INTRACAUDAL; PERINEURAL
Status: DISPENSED
Start: 2021-04-25

## (undated) RX ORDER — HEPARIN SODIUM 1000 [USP'U]/ML
INJECTION, SOLUTION INTRAVENOUS; SUBCUTANEOUS
Status: DISPENSED
Start: 2023-01-01

## (undated) RX ORDER — ACETIC ACID 5 %
LIQUID (ML) MISCELLANEOUS
Status: DISPENSED
Start: 2018-02-15

## (undated) RX ORDER — ASPIRIN 81 MG/1
TABLET, CHEWABLE ORAL
Status: DISPENSED
Start: 2021-04-06

## (undated) RX ORDER — FERRIC SUBSULFATE 0.21 G/G
LIQUID TOPICAL
Status: DISPENSED
Start: 2018-09-25

## (undated) RX ORDER — CEFAZOLIN SODIUM 2 G/100ML
INJECTION, SOLUTION INTRAVENOUS
Status: DISPENSED
Start: 2021-04-06

## (undated) RX ORDER — SIMETHICONE 40MG/0.6ML
SUSPENSION, DROPS(FINAL DOSAGE FORM)(ML) ORAL
Status: DISPENSED
Start: 2017-08-09

## (undated) RX ORDER — FENTANYL CITRATE 50 UG/ML
INJECTION, SOLUTION INTRAMUSCULAR; INTRAVENOUS
Status: DISPENSED
Start: 2021-04-25

## (undated) RX ORDER — HYDROMORPHONE HYDROCHLORIDE 1 MG/ML
INJECTION, SOLUTION INTRAMUSCULAR; INTRAVENOUS; SUBCUTANEOUS
Status: DISPENSED
Start: 2018-09-25

## (undated) RX ORDER — FENTANYL CITRATE 50 UG/ML
INJECTION, SOLUTION INTRAMUSCULAR; INTRAVENOUS
Status: DISPENSED
Start: 2021-04-28

## (undated) RX ORDER — ONDANSETRON 2 MG/ML
INJECTION INTRAMUSCULAR; INTRAVENOUS
Status: DISPENSED
Start: 2018-03-14

## (undated) RX ORDER — HEPARIN SODIUM 1000 [USP'U]/ML
INJECTION, SOLUTION INTRAVENOUS; SUBCUTANEOUS
Status: DISPENSED
Start: 2021-04-06

## (undated) RX ORDER — CEFAZOLIN SODIUM 1 G/3ML
INJECTION, POWDER, FOR SOLUTION INTRAMUSCULAR; INTRAVENOUS
Status: DISPENSED
Start: 2018-09-25

## (undated) RX ORDER — FENTANYL CITRATE 50 UG/ML
INJECTION, SOLUTION INTRAMUSCULAR; INTRAVENOUS
Status: DISPENSED
Start: 2021-04-27

## (undated) RX ORDER — NITROGLYCERIN 20 MG/100ML
INJECTION INTRAVENOUS
Status: DISPENSED
Start: 2021-04-25

## (undated) RX ORDER — BUPIVACAINE HYDROCHLORIDE 5 MG/ML
INJECTION, SOLUTION PERINEURAL
Status: DISPENSED
Start: 2018-09-25

## (undated) RX ORDER — DEXAMETHASONE SODIUM PHOSPHATE 4 MG/ML
INJECTION, SOLUTION INTRA-ARTICULAR; INTRALESIONAL; INTRAMUSCULAR; INTRAVENOUS; SOFT TISSUE
Status: DISPENSED
Start: 2021-04-06

## (undated) RX ORDER — FENTANYL CITRATE 50 UG/ML
INJECTION, SOLUTION INTRAMUSCULAR; INTRAVENOUS
Status: DISPENSED
Start: 2021-04-06

## (undated) RX ORDER — LIDOCAINE HYDROCHLORIDE AND EPINEPHRINE 10; 10 MG/ML; UG/ML
INJECTION, SOLUTION INFILTRATION; PERINEURAL
Status: DISPENSED
Start: 2018-02-15

## (undated) RX ORDER — HEPARIN SODIUM 1000 [USP'U]/ML
INJECTION, SOLUTION INTRAVENOUS; SUBCUTANEOUS
Status: DISPENSED
Start: 2021-04-25

## (undated) RX ORDER — ONDANSETRON 2 MG/ML
INJECTION INTRAMUSCULAR; INTRAVENOUS
Status: DISPENSED
Start: 2018-09-25

## (undated) RX ORDER — SILVER SULFADIAZINE 10 MG/G
CREAM TOPICAL
Status: DISPENSED
Start: 2018-09-25

## (undated) RX ORDER — ESMOLOL HYDROCHLORIDE 10 MG/ML
INJECTION INTRAVENOUS
Status: DISPENSED
Start: 2021-04-06

## (undated) RX ORDER — DEXAMETHASONE SODIUM PHOSPHATE 4 MG/ML
INJECTION, SOLUTION INTRA-ARTICULAR; INTRALESIONAL; INTRAMUSCULAR; INTRAVENOUS; SOFT TISSUE
Status: DISPENSED
Start: 2018-09-25

## (undated) RX ORDER — ACETIC ACID 5 %
LIQUID (ML) MISCELLANEOUS
Status: DISPENSED
Start: 2018-09-25

## (undated) RX ORDER — EPHEDRINE SULFATE 50 MG/ML
INJECTION, SOLUTION INTRAMUSCULAR; INTRAVENOUS; SUBCUTANEOUS
Status: DISPENSED
Start: 2021-04-06

## (undated) RX ORDER — PROPOFOL 10 MG/ML
INJECTION, EMULSION INTRAVENOUS
Status: DISPENSED
Start: 2021-04-06

## (undated) RX ORDER — ACETAMINOPHEN 325 MG/1
TABLET ORAL
Status: DISPENSED
Start: 2018-03-14

## (undated) RX ORDER — ONDANSETRON 2 MG/ML
INJECTION INTRAMUSCULAR; INTRAVENOUS
Status: DISPENSED
Start: 2017-08-09

## (undated) RX ORDER — VANCOMYCIN HYDROCHLORIDE 1 G/20ML
INJECTION, POWDER, LYOPHILIZED, FOR SOLUTION INTRAVENOUS
Status: DISPENSED
Start: 2021-04-25

## (undated) RX ORDER — ONDANSETRON 2 MG/ML
INJECTION INTRAMUSCULAR; INTRAVENOUS
Status: DISPENSED
Start: 2021-04-06

## (undated) RX ORDER — PROPOFOL 10 MG/ML
INJECTION, EMULSION INTRAVENOUS
Status: DISPENSED
Start: 2018-09-25

## (undated) RX ORDER — FENTANYL CITRATE 50 UG/ML
INJECTION, SOLUTION INTRAMUSCULAR; INTRAVENOUS
Status: DISPENSED
Start: 2018-09-25

## (undated) RX ORDER — EPHEDRINE SULFATE 50 MG/ML
INJECTION, SOLUTION INTRAMUSCULAR; INTRAVENOUS; SUBCUTANEOUS
Status: DISPENSED
Start: 2023-01-01

## (undated) RX ORDER — BUPIVACAINE HYDROCHLORIDE AND EPINEPHRINE 2.5; 5 MG/ML; UG/ML
INJECTION, SOLUTION EPIDURAL; INFILTRATION; INTRACAUDAL; PERINEURAL
Status: DISPENSED
Start: 2023-01-01

## (undated) RX ORDER — PROPOFOL 10 MG/ML
INJECTION, EMULSION INTRAVENOUS
Status: DISPENSED
Start: 2023-01-01

## (undated) RX ORDER — CEFAZOLIN SODIUM/WATER 2 G/20 ML
SYRINGE (ML) INTRAVENOUS
Status: DISPENSED
Start: 2023-01-01

## (undated) RX ORDER — BUPIVACAINE HYDROCHLORIDE AND EPINEPHRINE 5; 5 MG/ML; UG/ML
INJECTION, SOLUTION EPIDURAL; INTRACAUDAL; PERINEURAL
Status: DISPENSED
Start: 2018-03-14

## (undated) RX ORDER — OXYCODONE HYDROCHLORIDE 5 MG/1
TABLET ORAL
Status: DISPENSED
Start: 2018-03-14

## (undated) RX ORDER — FERRIC SUBSULFATE 0.21 G/G
LIQUID TOPICAL
Status: DISPENSED
Start: 2018-02-15

## (undated) RX ORDER — BUPIVACAINE HYDROCHLORIDE 2.5 MG/ML
INJECTION, SOLUTION EPIDURAL; INFILTRATION; INTRACAUDAL
Status: DISPENSED
Start: 2021-04-06

## (undated) RX ORDER — SODIUM CHLORIDE 9 MG/ML
INJECTION, SOLUTION INTRAVENOUS
Status: DISPENSED
Start: 2021-04-06

## (undated) RX ORDER — FENTANYL CITRATE 50 UG/ML
INJECTION, SOLUTION INTRAMUSCULAR; INTRAVENOUS
Status: DISPENSED
Start: 2021-04-24

## (undated) RX ORDER — ASPIRIN 325 MG
TABLET ORAL
Status: DISPENSED
Start: 2021-04-25

## (undated) RX ORDER — LIDOCAINE HYDROCHLORIDE 20 MG/ML
SOLUTION OROPHARYNGEAL
Status: DISPENSED
Start: 2021-04-27

## (undated) RX ORDER — CEFAZOLIN SODIUM 1 G/3ML
INJECTION, POWDER, FOR SOLUTION INTRAMUSCULAR; INTRAVENOUS
Status: DISPENSED
Start: 2021-04-06

## (undated) RX ORDER — FENTANYL CITRATE 50 UG/ML
INJECTION, SOLUTION INTRAMUSCULAR; INTRAVENOUS
Status: DISPENSED
Start: 2017-08-09

## (undated) RX ORDER — PROPOFOL 10 MG/ML
INJECTION, EMULSION INTRAVENOUS
Status: DISPENSED
Start: 2018-03-14

## (undated) RX ORDER — ONDANSETRON 2 MG/ML
INJECTION INTRAMUSCULAR; INTRAVENOUS
Status: DISPENSED
Start: 2023-01-01

## (undated) RX ORDER — HEPARIN SODIUM 1000 [USP'U]/ML
INJECTION, SOLUTION INTRAVENOUS; SUBCUTANEOUS
Status: DISPENSED
Start: 2021-04-24

## (undated) RX ORDER — IODIXANOL 320 MG/ML
INJECTION, SOLUTION INTRAVASCULAR
Status: DISPENSED
Start: 2021-04-06

## (undated) RX ORDER — SODIUM CHLORIDE, SODIUM LACTATE, POTASSIUM CHLORIDE, CALCIUM CHLORIDE 600; 310; 30; 20 MG/100ML; MG/100ML; MG/100ML; MG/100ML
INJECTION, SOLUTION INTRAVENOUS
Status: DISPENSED
Start: 2021-04-25

## (undated) RX ORDER — HYDROMORPHONE HYDROCHLORIDE 1 MG/ML
INJECTION, SOLUTION INTRAMUSCULAR; INTRAVENOUS; SUBCUTANEOUS
Status: DISPENSED
Start: 2021-04-06

## (undated) RX ORDER — PROTAMINE SULFATE 10 MG/ML
INJECTION, SOLUTION INTRAVENOUS
Status: DISPENSED
Start: 2023-01-01

## (undated) RX ORDER — PROTAMINE SULFATE 10 MG/ML
INJECTION, SOLUTION INTRAVENOUS
Status: DISPENSED
Start: 2021-04-06

## (undated) RX ORDER — HYDRALAZINE HYDROCHLORIDE 20 MG/ML
INJECTION INTRAMUSCULAR; INTRAVENOUS
Status: DISPENSED
Start: 2023-01-01

## (undated) RX ORDER — DIPHENHYDRAMINE HYDROCHLORIDE 50 MG/ML
INJECTION INTRAMUSCULAR; INTRAVENOUS
Status: DISPENSED
Start: 2017-08-09

## (undated) RX ORDER — FENTANYL CITRATE 50 UG/ML
INJECTION, SOLUTION INTRAMUSCULAR; INTRAVENOUS
Status: DISPENSED
Start: 2023-01-01